# Patient Record
Sex: MALE | Race: WHITE | NOT HISPANIC OR LATINO | Employment: OTHER | URBAN - METROPOLITAN AREA
[De-identification: names, ages, dates, MRNs, and addresses within clinical notes are randomized per-mention and may not be internally consistent; named-entity substitution may affect disease eponyms.]

---

## 2021-04-15 DIAGNOSIS — Z23 ENCOUNTER FOR IMMUNIZATION: ICD-10-CM

## 2022-07-22 ENCOUNTER — APPOINTMENT (EMERGENCY)
Dept: RADIOLOGY | Facility: HOSPITAL | Age: 87
DRG: 312 | End: 2022-07-22
Payer: MEDICARE

## 2022-07-22 ENCOUNTER — HOSPITAL ENCOUNTER (INPATIENT)
Facility: HOSPITAL | Age: 87
LOS: 1 days | Discharge: NURSING, REHAB OR LONG-TERM CARE | DRG: 312 | End: 2022-07-25
Attending: EMERGENCY MEDICINE | Admitting: INTERNAL MEDICINE
Payer: MEDICARE

## 2022-07-22 ENCOUNTER — APPOINTMENT (EMERGENCY)
Dept: RADIOLOGY | Facility: HOSPITAL | Age: 87
DRG: 312 | End: 2022-07-22
Attending: EMERGENCY MEDICINE
Payer: MEDICARE

## 2022-07-22 DIAGNOSIS — R60.0 LEG EDEMA, RIGHT: ICD-10-CM

## 2022-07-22 DIAGNOSIS — R27.0 ATAXIA: Primary | ICD-10-CM

## 2022-07-22 DIAGNOSIS — G45.9 TIA (TRANSIENT ISCHEMIC ATTACK): ICD-10-CM

## 2022-07-22 LAB
ALBUMIN SERPL-MCNC: 3 G/DL (ref 3.4–5)
ALP SERPL-CCNC: 85 IU/L (ref 35–126)
ALT SERPL-CCNC: 15 IU/L (ref 16–63)
ANION GAP SERPL CALC-SCNC: 10 MEQ/L (ref 3–15)
AST SERPL-CCNC: 37 IU/L (ref 15–41)
BACTERIA URNS QL MICRO: NORMAL /HPF
BASOPHILS # BLD: 0.03 K/UL (ref 0.01–0.1)
BASOPHILS NFR BLD: 0.4 %
BILIRUB SERPL-MCNC: 1.7 MG/DL (ref 0.3–1.2)
BILIRUB UR QL STRIP.AUTO: NEGATIVE MG/DL
BUN SERPL-MCNC: 30 MG/DL (ref 8–20)
CALCIUM SERPL-MCNC: 8.5 MG/DL (ref 8.9–10.3)
CHLORIDE SERPL-SCNC: 104 MEQ/L (ref 98–109)
CLARITY UR REFRACT.AUTO: CLEAR
CO2 SERPL-SCNC: 20 MEQ/L (ref 22–32)
COLOR UR AUTO: YELLOW
CREAT SERPL-MCNC: 1.5 MG/DL (ref 0.8–1.3)
DIFFERENTIAL METHOD BLD: ABNORMAL
EOSINOPHIL # BLD: 0.07 K/UL (ref 0.04–0.54)
EOSINOPHIL NFR BLD: 0.9 %
ERYTHROCYTE [DISTWIDTH] IN BLOOD BY AUTOMATED COUNT: 14.7 % (ref 11.6–14.4)
GFR SERPL CREATININE-BSD FRML MDRD: 44 ML/MIN/1.73M*2
GLUCOSE SERPL-MCNC: 203 MG/DL (ref 70–99)
GLUCOSE UR STRIP.AUTO-MCNC: >=1000 MG/DL
HCT VFR BLDCO AUTO: 39.1 % (ref 40.1–51)
HGB BLD-MCNC: 13.5 G/DL (ref 13.7–17.5)
HGB UR QL STRIP.AUTO: NEGATIVE
HYALINE CASTS #/AREA URNS LPF: NORMAL /LPF
IMM GRANULOCYTES # BLD AUTO: 0.01 K/UL (ref 0–0.08)
IMM GRANULOCYTES NFR BLD AUTO: 0.1 %
KETONES UR STRIP.AUTO-MCNC: NEGATIVE MG/DL
LEUKOCYTE ESTERASE UR QL STRIP.AUTO: NEGATIVE
LYMPHOCYTES # BLD: 0.74 K/UL (ref 1.2–3.5)
LYMPHOCYTES NFR BLD: 9.7 %
MCH RBC QN AUTO: 35.5 PG (ref 28–33.2)
MCHC RBC AUTO-ENTMCNC: 34.5 G/DL (ref 32.2–36.5)
MCV RBC AUTO: 102.9 FL (ref 83–98)
MONOCYTES # BLD: 0.99 K/UL (ref 0.3–1)
MONOCYTES NFR BLD: 13 %
NEUTROPHILS # BLD: 5.78 K/UL (ref 1.7–7)
NEUTS SEG NFR BLD: 75.9 %
NITRITE UR QL STRIP.AUTO: NEGATIVE
NRBC BLD-RTO: 0 %
PDW BLD AUTO: 10 FL (ref 9.4–12.4)
PH UR STRIP.AUTO: 7.5 [PH]
PLATELET # BLD AUTO: 148 K/UL (ref 150–350)
POTASSIUM SERPL-SCNC: 4.1 MEQ/L (ref 3.6–5.1)
PROT SERPL-MCNC: 6.7 G/DL (ref 6–8.2)
PROT UR QL STRIP.AUTO: 1
RBC # BLD AUTO: 3.8 M/UL (ref 4.5–5.8)
RBC #/AREA URNS HPF: NORMAL /HPF
SARS-COV-2 RNA RESP QL NAA+PROBE: NEGATIVE
SODIUM SERPL-SCNC: 134 MEQ/L (ref 136–144)
SP GR UR REFRACT.AUTO: 1.02
SQUAMOUS URNS QL MICRO: NORMAL /HPF
UROBILINOGEN UR STRIP-ACNC: 8 EU/DL
WBC # BLD AUTO: 7.62 K/UL (ref 3.8–10.5)
WBC #/AREA URNS HPF: NORMAL /HPF

## 2022-07-22 PROCEDURE — 71045 X-RAY EXAM CHEST 1 VIEW: CPT

## 2022-07-22 PROCEDURE — 25800000 HC PHARMACY IV SOLUTIONS: Performed by: PHYSICIAN ASSISTANT

## 2022-07-22 PROCEDURE — U0002 COVID-19 LAB TEST NON-CDC: HCPCS

## 2022-07-22 PROCEDURE — 99285 EMERGENCY DEPT VISIT HI MDM: CPT | Mod: 25

## 2022-07-22 PROCEDURE — 93005 ELECTROCARDIOGRAM TRACING: CPT | Performed by: EMERGENCY MEDICINE

## 2022-07-22 PROCEDURE — 80053 COMPREHEN METABOLIC PANEL: CPT | Performed by: EMERGENCY MEDICINE

## 2022-07-22 PROCEDURE — 80053 COMPREHEN METABOLIC PANEL: CPT

## 2022-07-22 PROCEDURE — 85025 COMPLETE CBC W/AUTO DIFF WBC: CPT

## 2022-07-22 PROCEDURE — 81001 URINALYSIS AUTO W/SCOPE: CPT | Performed by: PHYSICIAN ASSISTANT

## 2022-07-22 PROCEDURE — 93005 ELECTROCARDIOGRAM TRACING: CPT

## 2022-07-22 PROCEDURE — 85025 COMPLETE CBC W/AUTO DIFF WBC: CPT | Performed by: EMERGENCY MEDICINE

## 2022-07-22 PROCEDURE — 96361 HYDRATE IV INFUSION ADD-ON: CPT | Performed by: HOSPITALIST

## 2022-07-22 PROCEDURE — 99220 PR INITIAL OBSERVATION CARE/DAY 70 MINUTES: CPT | Performed by: HOSPITALIST

## 2022-07-22 PROCEDURE — 70450 CT HEAD/BRAIN W/O DYE: CPT | Mod: MG

## 2022-07-22 PROCEDURE — 36415 COLL VENOUS BLD VENIPUNCTURE: CPT

## 2022-07-22 PROCEDURE — U0002 COVID-19 LAB TEST NON-CDC: HCPCS | Performed by: EMERGENCY MEDICINE

## 2022-07-22 PROCEDURE — 96360 HYDRATION IV INFUSION INIT: CPT

## 2022-07-22 PROCEDURE — 1123F ACP DISCUSS/DSCN MKR DOCD: CPT | Performed by: HOSPITALIST

## 2022-07-22 RX ORDER — HYDROCHLOROTHIAZIDE 12.5 MG/1
CAPSULE ORAL
COMMUNITY
End: 2022-07-22

## 2022-07-22 RX ORDER — LEVOTHYROXINE SODIUM 100 UG/1
100 TABLET ORAL
Status: ON HOLD | COMMUNITY
End: 2022-08-19 | Stop reason: SDUPTHER

## 2022-07-22 RX ORDER — SPIRONOLACTONE 25 MG/1
25 TABLET ORAL DAILY
COMMUNITY
Start: 2022-04-22 | End: 2022-08-23 | Stop reason: HOSPADM

## 2022-07-22 RX ORDER — UBIDECARENONE 100 MG
100 CAPSULE ORAL DAILY
COMMUNITY
End: 2022-08-23 | Stop reason: HOSPADM

## 2022-07-22 RX ORDER — TORSEMIDE 10 MG/1
10-20 TABLET ORAL DAILY
Status: ON HOLD | COMMUNITY
End: 2022-08-19 | Stop reason: SDUPTHER

## 2022-07-22 RX ORDER — SILDENAFIL CITRATE 20 MG/1
20 TABLET ORAL 3 TIMES DAILY
COMMUNITY
Start: 2022-04-22 | End: 2022-08-23 | Stop reason: HOSPADM

## 2022-07-22 RX ORDER — SIMVASTATIN 10 MG/1
10 TABLET, FILM COATED ORAL NIGHTLY
COMMUNITY
Start: 2022-03-25 | End: 2022-08-23 | Stop reason: HOSPADM

## 2022-07-22 RX ORDER — SACUBITRIL AND VALSARTAN 49; 51 MG/1; MG/1
1 TABLET, FILM COATED ORAL 2 TIMES DAILY
COMMUNITY
End: 2022-08-23 | Stop reason: HOSPADM

## 2022-07-22 RX ORDER — AMLODIPINE BESYLATE 2.5 MG/1
TABLET ORAL
COMMUNITY
End: 2022-07-22

## 2022-07-22 RX ORDER — CLOPIDOGREL BISULFATE 75 MG/1
75 TABLET ORAL DAILY
Status: ON HOLD | COMMUNITY
Start: 2022-02-15 | End: 2022-08-19 | Stop reason: SDUPTHER

## 2022-07-22 RX ORDER — METOPROLOL SUCCINATE 100 MG/1
100 TABLET, EXTENDED RELEASE ORAL DAILY
Status: ON HOLD | COMMUNITY
Start: 2022-04-22 | End: 2022-08-19 | Stop reason: SDUPTHER

## 2022-07-22 RX ORDER — OMEPRAZOLE 20 MG/1
CAPSULE, DELAYED RELEASE ORAL
COMMUNITY
End: 2022-07-22

## 2022-07-22 RX ADMIN — SODIUM CHLORIDE 500 ML: 9 INJECTION, SOLUTION INTRAVENOUS at 22:23

## 2022-07-22 ASSESSMENT — ENCOUNTER SYMPTOMS
ACTIVITY CHANGE: 0
WEAKNESS: 1
HEADACHES: 0
WHEEZING: 0
ABDOMINAL PAIN: 0
HEMATURIA: 0
FACIAL SWELLING: 0
SPEECH DIFFICULTY: 0
NECK PAIN: 0
AGITATION: 0
SHORTNESS OF BREATH: 0
BACK PAIN: 0
SEIZURES: 0
VOMITING: 0
DIAPHORESIS: 0
COLOR CHANGE: 0
SORE THROAT: 0
FEVER: 0
DIARRHEA: 0
COUGH: 0
NAUSEA: 0
DIFFICULTY URINATING: 0

## 2022-07-23 ENCOUNTER — APPOINTMENT (OUTPATIENT)
Dept: RADIOLOGY | Facility: HOSPITAL | Age: 87
Setting detail: OBSERVATION
DRG: 312 | End: 2022-07-23
Attending: HOSPITALIST
Payer: MEDICARE

## 2022-07-23 PROBLEM — R26.9 GAIT DISTURBANCE: Status: ACTIVE | Noted: 2022-07-23

## 2022-07-23 LAB
ANION GAP SERPL CALC-SCNC: 10 MEQ/L (ref 3–15)
ATRIAL RATE: 85
BUN SERPL-MCNC: 27 MG/DL (ref 8–20)
CALCIUM SERPL-MCNC: 8.6 MG/DL (ref 8.9–10.3)
CHLORIDE SERPL-SCNC: 105 MEQ/L (ref 98–109)
CHOLEST SERPL-MCNC: 94 MG/DL
CO2 SERPL-SCNC: 21 MEQ/L (ref 22–32)
CREAT SERPL-MCNC: 1.3 MG/DL (ref 0.8–1.3)
ERYTHROCYTE [DISTWIDTH] IN BLOOD BY AUTOMATED COUNT: 14.7 % (ref 11.6–14.4)
EST. AVERAGE GLUCOSE BLD GHB EST-MCNC: 134 MG/DL
GFR SERPL CREATININE-BSD FRML MDRD: 51.9 ML/MIN/1.73M*2
GLUCOSE BLD-MCNC: 107 MG/DL (ref 70–99)
GLUCOSE BLD-MCNC: 132 MG/DL (ref 70–99)
GLUCOSE BLD-MCNC: 163 MG/DL (ref 70–99)
GLUCOSE SERPL-MCNC: 112 MG/DL (ref 70–99)
HBA1C MFR BLD HPLC: 6.3 %
HCT VFR BLDCO AUTO: 37.4 % (ref 40.1–51)
HDLC SERPL-MCNC: 23 MG/DL
HDLC SERPL: 4.1 {RATIO}
HGB BLD-MCNC: 12.6 G/DL (ref 13.7–17.5)
LDLC SERPL CALC-MCNC: 63 MG/DL
MCH RBC QN AUTO: 35.2 PG (ref 28–33.2)
MCHC RBC AUTO-ENTMCNC: 33.7 G/DL (ref 32.2–36.5)
MCV RBC AUTO: 104.5 FL (ref 83–98)
NONHDLC SERPL-MCNC: 71 MG/DL
PDW BLD AUTO: 10.5 FL (ref 9.4–12.4)
PLATELET # BLD AUTO: 148 K/UL (ref 150–350)
POCT TEST: ABNORMAL
POTASSIUM SERPL-SCNC: 3.8 MEQ/L (ref 3.6–5.1)
QRS DURATION: 168
QT INTERVAL: 452
QTC CALCULATION(BAZETT): 515
R AXIS: 62
RBC # BLD AUTO: 3.58 M/UL (ref 4.5–5.8)
SODIUM SERPL-SCNC: 136 MEQ/L (ref 136–144)
T WAVE AXIS: 39
TRIGL SERPL-MCNC: 41 MG/DL (ref 30–149)
VENTRICULAR RATE: 78
WBC # BLD AUTO: 8.14 K/UL (ref 3.8–10.5)

## 2022-07-23 PROCEDURE — 63700000 HC SELF-ADMINISTRABLE DRUG: Performed by: HOSPITALIST

## 2022-07-23 PROCEDURE — G0378 HOSPITAL OBSERVATION PER HR: HCPCS

## 2022-07-23 PROCEDURE — 70544 MR ANGIOGRAPHY HEAD W/O DYE: CPT | Mod: ME

## 2022-07-23 PROCEDURE — 85027 COMPLETE CBC AUTOMATED: CPT | Performed by: HOSPITALIST

## 2022-07-23 PROCEDURE — 99226 PR SBSQ OBSERVATION CARE/DAY 35 MINUTES: CPT | Performed by: INTERNAL MEDICINE

## 2022-07-23 PROCEDURE — 99223 1ST HOSP IP/OBS HIGH 75: CPT | Performed by: PSYCHIATRY & NEUROLOGY

## 2022-07-23 PROCEDURE — 80048 BASIC METABOLIC PNL TOTAL CA: CPT | Performed by: HOSPITALIST

## 2022-07-23 PROCEDURE — G1004 CDSM NDSC: HCPCS

## 2022-07-23 PROCEDURE — 80061 LIPID PANEL: CPT | Performed by: HOSPITALIST

## 2022-07-23 PROCEDURE — 83036 HEMOGLOBIN GLYCOSYLATED A1C: CPT | Performed by: INTERNAL MEDICINE

## 2022-07-23 PROCEDURE — 93010 ELECTROCARDIOGRAM REPORT: CPT | Performed by: INTERNAL MEDICINE

## 2022-07-23 PROCEDURE — 93971 EXTREMITY STUDY: CPT | Mod: RT

## 2022-07-23 PROCEDURE — 36415 COLL VENOUS BLD VENIPUNCTURE: CPT | Performed by: HOSPITALIST

## 2022-07-23 RX ORDER — IBUPROFEN 200 MG
16-32 TABLET ORAL AS NEEDED
Status: DISCONTINUED | OUTPATIENT
Start: 2022-07-23 | End: 2022-07-25 | Stop reason: HOSPADM

## 2022-07-23 RX ORDER — DEXTROSE 40 %
15-30 GEL (GRAM) ORAL AS NEEDED
Status: DISCONTINUED | OUTPATIENT
Start: 2022-07-23 | End: 2022-07-25 | Stop reason: HOSPADM

## 2022-07-23 RX ORDER — CLOPIDOGREL BISULFATE 75 MG/1
75 TABLET ORAL DAILY
Status: DISCONTINUED | OUTPATIENT
Start: 2022-07-23 | End: 2022-07-25 | Stop reason: HOSPADM

## 2022-07-23 RX ORDER — SPIRONOLACTONE 25 MG/1
25 TABLET ORAL DAILY
Status: DISCONTINUED | OUTPATIENT
Start: 2022-07-23 | End: 2022-07-25 | Stop reason: HOSPADM

## 2022-07-23 RX ORDER — HEPARIN SODIUM 5000 [USP'U]/ML
5000 INJECTION, SOLUTION INTRAVENOUS; SUBCUTANEOUS EVERY 8 HOURS
Status: DISCONTINUED | OUTPATIENT
Start: 2022-07-23 | End: 2022-07-25 | Stop reason: HOSPADM

## 2022-07-23 RX ORDER — LEVOTHYROXINE SODIUM 100 UG/1
100 TABLET ORAL
Status: DISCONTINUED | OUTPATIENT
Start: 2022-07-23 | End: 2022-07-25 | Stop reason: HOSPADM

## 2022-07-23 RX ORDER — PRAVASTATIN SODIUM 20 MG/1
20 TABLET ORAL DAILY
Status: DISCONTINUED | OUTPATIENT
Start: 2022-07-23 | End: 2022-07-25 | Stop reason: HOSPADM

## 2022-07-23 RX ORDER — METOPROLOL SUCCINATE 100 MG/1
100 TABLET, EXTENDED RELEASE ORAL DAILY
Status: DISCONTINUED | OUTPATIENT
Start: 2022-07-23 | End: 2022-07-25 | Stop reason: HOSPADM

## 2022-07-23 RX ORDER — SILDENAFIL CITRATE 20 MG/1
20 TABLET ORAL 3 TIMES DAILY
Status: DISCONTINUED | OUTPATIENT
Start: 2022-07-23 | End: 2022-07-25 | Stop reason: HOSPADM

## 2022-07-23 RX ORDER — INSULIN ASPART 100 [IU]/ML
3-5 INJECTION, SOLUTION INTRAVENOUS; SUBCUTANEOUS
Status: DISCONTINUED | OUTPATIENT
Start: 2022-07-23 | End: 2022-07-24

## 2022-07-23 RX ORDER — DEXTROSE 50 % IN WATER (D50W) INTRAVENOUS SYRINGE
25 AS NEEDED
Status: DISCONTINUED | OUTPATIENT
Start: 2022-07-23 | End: 2022-07-25 | Stop reason: HOSPADM

## 2022-07-23 RX ORDER — SACUBITRIL AND VALSARTAN 49; 51 MG/1; MG/1
1 TABLET, FILM COATED ORAL 2 TIMES DAILY
Status: DISCONTINUED | OUTPATIENT
Start: 2022-07-23 | End: 2022-07-25 | Stop reason: HOSPADM

## 2022-07-23 RX ORDER — TORSEMIDE 10 MG/1
10 TABLET ORAL DAILY
Status: DISCONTINUED | OUTPATIENT
Start: 2022-07-23 | End: 2022-07-25 | Stop reason: HOSPADM

## 2022-07-23 RX ADMIN — SILDENAFIL CITRATE 20 MG: 20 TABLET ORAL at 10:52

## 2022-07-23 RX ADMIN — SACUBITRIL AND VALSARTAN 1 TABLET: 49; 51 TABLET, FILM COATED ORAL at 20:19

## 2022-07-23 RX ADMIN — PRAVASTATIN SODIUM 20 MG: 20 TABLET ORAL at 17:40

## 2022-07-23 RX ADMIN — EMPAGLIFLOZIN 10 MG: 10 TABLET, FILM COATED ORAL at 10:52

## 2022-07-23 RX ADMIN — METOPROLOL SUCCINATE 100 MG: 100 TABLET, EXTENDED RELEASE ORAL at 10:52

## 2022-07-23 RX ADMIN — LEVOTHYROXINE SODIUM 100 MCG: 0.1 TABLET ORAL at 05:48

## 2022-07-23 RX ADMIN — SILDENAFIL CITRATE 20 MG: 20 TABLET ORAL at 20:19

## 2022-07-23 RX ADMIN — PRAVASTATIN SODIUM 20 MG: 20 TABLET ORAL at 02:24

## 2022-07-23 ASSESSMENT — PATIENT HEALTH QUESTIONNAIRE - PHQ9: SUM OF ALL RESPONSES TO PHQ9 QUESTIONS 1 & 2: 0

## 2022-07-23 NOTE — CONSULTS
"  Neurology Consult Note    Reason for Consultation: Episodes of gait disturbance  Chief Complaint: \"I had three episodes of heart symptoms\"    History of Presenting Illness:  The patient is a 89 year old Right handed man with reported medical history most notable for CAD, A Fib, advanced CHF, advanced Tricuspid regurgitation and Pulmonary Hypertension (on Sildenafil), with additional history most notable for severe GI bleeding in the past resulting in anticoagulation cessation since. The patient is currently admitted to the hospital for evaluation of recurrent episodes of generalized weakness associated with postural changes and/or exertion.    Neurology is consulted to evaluate the patient for possible stroke/cerebrovascular disease as being the etiology for his presenting symptoms.    History is obtained from the patient. Collateral information is obtained from the patient's medical chart, and reviewed as documented in the ER and Admission H&P notes.    The patient reports having three episodes of similar generalized weakness, with the most severe occurring yesterday, resulting in his acute hospitalization.   - He reports his initial mild symptomatic event to have occurred about 2 weeks ago, which is he describes as a sensation of generalized weakness associated with sudden orthostatic postural change. He reports that he stood up from a prolonged seated position and took a few steps, and then felt very weak and unable to take any more steps. He felt frozen in place and would have collapsed if he was not held up by his family members. He was helped down into a chair, and felt better immediately after sitting down, and felt back to his baseline within 10 to 15 minutes of resting. This is described at the mildest of his symptoms.  - He reports his second symptomatic event to have occurred 2 days ago, on 7/21/2022. He describes this as a sensation of moderate to severe generalized weakness, with inability to stand up " from a seated position by himself. He attempted to pull himself up using a Walker but was unable to do so. He was helped up by a family member but felt frozen in place again, and unable to take a step due to feeling weakness.   - He reports having his most recent symptomatic episode yesterday, which was also the most severe. He reports that he had been upright, walking around in his residence, packing for a trip. He had gradual onset of the generalized weakness over several minutes, resulting in the same sensation of being unable to walk anymore/take anymore steps, and had to be helped back into a chair by his family. He felt better after sitting down, and back to his usual baseline about half hour later.  - He denies any headache, changes in his level of consciousness, dizziness, lightheadedness, vertigo, changes in his vision, changes in his speech, any confusion, changes in his peripheral sensation or changes in his peripheral strength with any of these events. He reports that he continues to be able to move his legs once he is in a seated position. He also reports sitting and resting to consistently result in immediate improvement in his symptoms.  - Of note, the patient was started on Verquvo by one of his outpatient providers about 4 days ago, despite the patient being on treatment with Revatio for his pulmonary hypertension. The patient's more severe daily events occurred immediately after adding Verquvo to his medication regimen. On top of that, the patient is very clear about having problems with low BP in the past, even prior to starting this new medication.    Review of Systems  All other systems reviewed and negative except as noted in the HPI.    Allergies: Patient has no known allergies.    Current Inpatient Medications   Medication Dose Route Frequency Provider Last Rate Last Admin   • clopidogreL (PLAVIX) tablet 75 mg  75 mg oral Daily Natalie Ambrosio O, DO       • glucose chewable tablet 16-32 g of  dextrose  16-32 g of dextrose oral PRN Natalie Ambrosio DO        Or   • dextrose 40 % oral gel 15-30 g of dextrose  15-30 g of dextrose oral PRN Natalie Ambrosio DO        Or   • glucagon (GLUCAGEN) injection 1 mg  1 mg intramuscular PRN Natalie Ambrosio DO        Or   • dextrose 50 % in water (D50) injection 12.5 g  25 mL intravenous PRN Natalie Ambrosio DO       • empagliflozin (JARDIANCE) tablet 10 mg  10 mg oral Daily Natalie Ambrosio DO   10 mg at 07/23/22 1052   • heparin (porcine) 5,000 unit/mL injection 5,000 Units  5,000 Units subcutaneous q8h IVÁN Natalie Ambrosio DO       • insulin aspart U-100 (NovoLOG) pen 3-5 Units  3-5 Units subcutaneous TID with meals Natalie Ambrosio DO       • levothyroxine (SYNTHROID) tablet 100 mcg  100 mcg oral Daily (6:30a) Natalie Ambrosio DO   100 mcg at 07/23/22 0548   • metoprolol succinate XL (TOPROL-XL) 24 hr ER tablet 100 mg  100 mg oral Daily Natalie Ambrosio DO   100 mg at 07/23/22 1052   • pravastatin (PRAVACHOL) tablet 20 mg  20 mg oral Daily Natalie Ambrosio DO   20 mg at 07/23/22 0224   • [Provider Managed Hold] sacubitriL-valsartan (ENTRESTO) 49-51 mg per tablet 1 tablet  1 tablet oral BID Natalie Ambrosio DO       • sildenafiL (pulm.hypertension) (REVATIO) tablet 20 mg  20 mg oral TID Natalie Ambrosio DO   20 mg at 07/23/22 1052   • [Provider Managed Hold] spironolactone (ALDACTONE) tablet 25 mg  25 mg oral Daily Natalie Ambrosio DO       • [Provider Managed Hold] torsemide (DEMADEX) tablet 10 mg  10 mg oral Daily Natalie Ambrosio DO       • vericiguat (VERQUVO) tablet 2.5 mg  2.5 mg oral Daily with breakfast Natalie Ambrosio DO           Medical History:   Past Medical History:   Diagnosis Date   • Atrial fibrillation (CMS/HCC)    • Disease of thyroid gland    • Hypertension    • Myocardial infarction (CMS/HCC)        Surgical History:   Past Surgical History:   Procedure Laterality Date   • BYPASS GRAFT          Social History:   Social History     Socioeconomic History   • Marital status:      Spouse name: None   • Number of children: None   • Years of education: None   • Highest education level: None   Tobacco Use   • Smoking status: Never Smoker   • Smokeless tobacco: Never Used     Social Determinants of Health     Food Insecurity: No Food Insecurity   • Worried About Running Out of Food in the Last Year: Never true   • Ran Out of Food in the Last Year: Never true       Family History: History reviewed. No pertinent family history.    Objective   Temp:  [36.7 °C (98.1 °F)] 36.7 °C (98.1 °F)  Heart Rate:  [65-74] 65  Resp:  [16-23] 20  BP: (112-135)/(59-92) 128/71  SpO2:  [95 %-98 %] 98 %  Oxygen Therapy: None (Room air)    NIHSS Score: 0    Physical Exam  General Appearance: Elderly man with age appropriate appearance, resting in his hospital bed, without any acute clinical distress  Cardiac: RRR, asymmetric peripheral edema worse in his Right leg compared to his Left  Respiratory: Mild tachypnea at rest    Neurologic Exam:  Head/Neck: No scalp tenderness on palpation. No nuchal rigidity  Mental Status:  - Level of Consciousness: Well Awake  - Alertness: Alert in response to voice with appropriately timed responses  - Attention: Intact/Normal  - Orientation: Intact to person/self; Intact to time (date/month/year); Intact to location; Intact to situation appropriately  - Neglect - No visual or spatial neglect noted on exam  - Mood/Affect - Normal mood with euthymic appearing affect  - Behavior/Cooperation - Pleasant and cooperative with exam    - Language: Speech is fluent with normal prosody  1. Naming - Names all simple objects to confrontation  2. Comprehension - Able to follow simple commands well; Able to follow complex 3 step commands crossing Right-Left without any difficulties  3. Repetition - Able to repeat full sentences without any errors  - Calculations: Able to perform simple calculations  - No  Ideomotor apraxia    Cranial Nerves:  CN II: Visual Fields: Intact to confrontation in all quadrants bilaterally without any neglect on double simultaneous stimulation; Pupils are equal, round and reactive to light bilaterally; Fundus exam: Optic discs cannot be visualized  CN III/IV/VI: Extraocular movements are intact bilaterally in all directions without any nystagmus  CN V: Normal facial sensation bilaterally  CN VII: Normal symmetric facial movement at rest, with smiling and with speech in both upper and lower facial distributions  CN VIII: Hearing moderate to severely reduced bilaterally  CN IX/X: Symmetric palate elevation, No uvular deviation, No dysarthria noted  CN XI: Normal 5/5 shoulder shrug bilaterally  CN XII: Normal/midline tongue protrusion    Motor:  Bulk: No focal atrophy noted, but Right leg is larger than the Left due to asymmetric edema  Tone: Normal tone without any rigidity, spasticity or hypotonia noted on exam. No wrists rigidity despite contralateral activation  Bradykinesia: No decrementing bradykinesia noted on exam  Abnormal movements: No resting, action or intention tremors noted    Strength: Based on expected strength for age, clinical condition and muscle mass.  Shoulder abduction/Deltoids - 5/5 bilaterally  Elbow Flexion/Biceps - 5/5 bilaterally  Elbow Extension/Triceps - 5/5 bilaterally  Wrist Flexion - 5/5 bilaterally  Wrist Extension - 5/5 bilaterally   strength/Interosseous - 5/5 bilaterally    Hip Flexion/Iliopsoas - 5/5 bilaterally  Hip Abduction - 5/5 bilaterally  Hip Adduction - 5/5 bilaterally  Knee Flexion/Hamstrings - 5/5 bilaterally  Knee Extension/Quadriceps - 5/5 bilaterally  Ankle Dorsiflexion/Tibialis Ant - 5/5 bilaterally  Ankle Planter flexion/Gastroc - 5/5 bilaterally    Sensory:  Sensation intact to light touch in both upper and lower extremities bilaterally  No sensory neglect    Reflexes:  Reflex Name: Right Left  Biceps                1+    1+  Brachioradialis   1+   1+  Knee Jerk          1+   1+  Ankle Jerk         1+   1+  Babinski     Absent   Absent  Clonus          None   None    Coordination:  No truncal ataxia  No dysmetria on Finger to nose or Heel to shin testing bilaterally    Gait: Cannot be tested at this time            Labs - I have reviewed the following lab results:  Admission on 07/22/2022   Component Date Value   • Sodium 07/22/2022 134 (A)   • Potassium 07/22/2022 4.1    • Chloride 07/22/2022 104    • CO2 07/22/2022 20 (A)   • BUN 07/22/2022 30 (A)   • Creatinine 07/22/2022 1.5 (A)   • Glucose 07/22/2022 203 (A)   • Calcium 07/22/2022 8.5 (A)   • AST (SGOT) 07/22/2022 37    • ALT (SGPT) 07/22/2022 15 (A)   • Alkaline Phosphatase 07/22/2022 85    • Total Protein 07/22/2022 6.7    • Albumin 07/22/2022 3.0 (A)   • Bilirubin, Total 07/22/2022 1.7 (A)   • eGFR 07/22/2022 44.0 (A)   • Anion Gap 07/22/2022 10    • WBC 07/22/2022 7.62    • RBC 07/22/2022 3.80 (A)   • Hemoglobin 07/22/2022 13.5 (A)   • Hematocrit 07/22/2022 39.1 (A)   • MCV 07/22/2022 102.9 (A)   • MCH 07/22/2022 35.5 (A)   • MCHC 07/22/2022 34.5    • RDW 07/22/2022 14.7 (A)   • Platelets 07/22/2022 148 (A)   • MPV 07/22/2022 10.0    • Differential Type 07/22/2022 Auto    • nRBC 07/22/2022 0.0    • Immature Granulocytes 07/22/2022 0.1    • Neutrophils 07/22/2022 75.9    • Lymphocytes 07/22/2022 9.7    • Monocytes 07/22/2022 13.0    • Eosinophils 07/22/2022 0.9    • Basophils 07/22/2022 0.4    • Immature Granulocytes, A* 07/22/2022 0.01    • Neutrophils, Absolute 07/22/2022 5.78    • Lymphocytes, Absolute 07/22/2022 0.74 (A)   • Monocytes, Absolute 07/22/2022 0.99    • Eosinophils, Absolute 07/22/2022 0.07    • Basophils, Absolute 07/22/2022 0.03    • SARS-CoV-2 (COVID-19) 07/22/2022 Negative    • Ventricular rate 07/22/2022 78    • Atrial rate 07/22/2022 85    • QRS duration 07/22/2022 168    • QT Interval 07/22/2022 452    • QTC Calculation(Bazett) 07/22/2022 515    • R  Axis 07/22/2022 62    • T Wave Axis 07/22/2022 39    • Color, Urine 07/22/2022 Yellow    • Clarity, Urine 07/22/2022 Clear    • Specific Gravity, Urine 07/22/2022 1.021    • pH, Urine 07/22/2022 7.5    • Leukocyte Esterase 07/22/2022 Negative    • Nitrite, Urine 07/22/2022 Negative    • Protein, Urine 07/22/2022 +1 (A)   • Glucose, Urine 07/22/2022 >=1000 (A)   • Ketones, Urine 07/22/2022 Negative    • Urobilinogen, Urine 07/22/2022 8.0 (A)   • Bilirubin, Urine 07/22/2022 Negative    • Blood, Urine 07/22/2022 Negative    • RBC, Urine 07/22/2022 0 TO 4    • WBC, Urine 07/22/2022 0 TO 3    • Squamous Epithelial 07/22/2022 None Seen    • Hyaline Cast 07/22/2022 None Seen    • Bacteria, Urine 07/22/2022 None Seen    • Sodium 07/23/2022 136    • Potassium 07/23/2022 3.8    • Chloride 07/23/2022 105    • CO2 07/23/2022 21 (A)   • BUN 07/23/2022 27 (A)   • Creatinine 07/23/2022 1.3    • Glucose 07/23/2022 112 (A)   • Calcium 07/23/2022 8.6 (A)   • eGFR 07/23/2022 51.9 (A)   • Anion Gap 07/23/2022 10    • Triglycerides 07/23/2022 41    • Cholesterol 07/23/2022 94    • HDL 07/23/2022 23 (A)   • LDL Calculated 07/23/2022 63    • Non-HDL, Calculated 07/23/2022 71    • RISK 07/23/2022 4.1    • WBC 07/23/2022 8.14    • RBC 07/23/2022 3.58 (A)   • Hemoglobin 07/23/2022 12.6 (A)   • Hematocrit 07/23/2022 37.4 (A)   • MCV 07/23/2022 104.5 (A)   • MCH 07/23/2022 35.2 (A)   • MCHC 07/23/2022 33.7    • RDW 07/23/2022 14.7 (A)   • Platelets 07/23/2022 148 (A)   • MPV 07/23/2022 10.5          Imaging:  I have personally reviewed the images for the NeuroImaging studies listed below. My interpretation is noted as following:     MRI  Brain without contrast: 7/23/2022   - No acute abnormalities, including no acute stroke or hemorrhage noted.  - Mild chronic white matter changes.  - Moderately advanced diffuse cerebral and cerebellar atrophy noted.    MRA Head/Neck without contrast: 7/23/2022   - Accuracy/Reliability limited due to  inherently lower resolution for MRA, presence of motion and other artifacts, and lack of IV contrast use. Within these limitations, the following potential findings are noted:  1. Intracranial atherosclerosis. 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  2. No clear stenosis in the visualized portion of extracranial carotid or vertebral arteries.          Impression/Assessment:  1. The patient's current clinical presentation suggests generalized weakness related to postural/orthostatic changes and/or exertional activity, likely related to symptomatic Hypotension. The two symptomatic events occurring on consecutive days resulting in this hospitalization appear to be directly related to the addition of Verquvo to the patient's medication regimen, which already included Revatio/Sildenafil. This medication combination is known to produce severe hypotension and is hence contraindicated. The patient's additional treatment with Entresto, Torsemide and Metoprolol may have further contributed to his symptomatic hypotension.  2. The patient's current clinical presentation reveals NO primary neurologic symptoms AT ALL in order to raise suspicion for Acute Stroke or TIA.   3. Theoretically, the patient is at risk of Vertebrobasilar insufficiency resulting in Syncope and Stroke, especially in case of persistent or recurrent systemic hypotension, due to the presence of proximal Basilar stenosis. However, the patient's current symptomatic events are not consistent with Vertebrobasilar insufficiency either, regardless of the intracranial vascular stenosis.    Recommendations:  1. The patient's Verquvo therapy should be discontinued immediately, and caution should be used to avoid similar drug-drug interactions in the future.   2. The patient's BP should be monitored in order to avoid any recurrent hypotensive events.   3. The patient requires  appropriate long term cardiac and pulmonary treatment in order to avoid any significant cerebral ischemia or hypoxia. Otherwise, the patient requires no further inpatient or outpatient neurologic work up/management for these current presenting symptoms.   4. The patient may continue his prior to admission Antiplatelet therapy with Plavix 75 mg daily, as long as there are no medical contraindications regarding this therapy. Similarly, the patient may continue his prior to admission Statin therapy from a neurologic stand point, and continue to follow up with his PCP for long term vascular risk factor management.  5. Supportive Physical therapy and ambulatory assistive device use is recommended to prevent falls. Otherwise, the patient's ambulatory function is limited due to his baseline cardiac and pulmonary disease, and not due to a primary neurologic problem. The patient is at his neurologic baseline on the current assessment. He may be discharged home from a neurologic stand point, if deemed stable from an overall medical/cardiopulmonary stand point.     The patient has been counseled extensively on the current neurologic findings, Impression and Recommendations noted above. Specifically, the patient has been counseled extensively to monitor his BP, volume status and cardiopulmonary symptoms closely. He is educated on signs/symptoms of acute stroke, and his risk for Syncope/Stroke due to systemic hypotension from his cardiopulmonary disease. All questions are answered in detail.    The patient's case/recommendations have been discussed with Dr. Blake/Primary Medicine team in detail today.

## 2022-07-23 NOTE — ASSESSMENT & PLAN NOTE
-- Chronic systolic heart failure, without exacerbation  - He denies any new or worsening symptoms and is at baseline, stable R>L BLE edema  - Restarted torsemide/ spironolactone/ ARNI  - Has severe pulmonary hypertension for which we will continue his home sildenafil and hold his vericiguat as above   -- US negative for RLE DVT

## 2022-07-23 NOTE — ED ATTESTATION NOTE
Procedures  Physical Exam  Review of Systems    7/23/20223:26 PM  I have personally seen and examined the patient.  I reviewed and agree with the PA/NP/Resident's assessment and plan of care.    My examination, assessment, and plan of care of Samy Elena is as follows:    The patient presents with transient ataxia a few times over the last few weeks.  He was in his doctor's office 3 weeks ago and could not get up off the chair.  Seems to have a show slow shuffling gait most the time and sometimes he feels extremely off balance.  These episodes usually last about 20 minutes at a time.    Exam: Awake alert and oriented at this point.  Neuro exam is nonfocal.  There is no dysmetria.  Lungs are clear.  Heart is sinus without any murmur.  Abdomen soft and nontender.    Impression/Plan: Possible TIAs with these episodes of ataxia.  Agree with PA management and hospitalization.  Labs and CT head are currently unremarked.    Vital Signs Review: Vital signs have been reviewed. The oxygen saturation is  SpO2: 95 % which is normal.     This document was created using dragon dictation software.  There might be some typographical errors due to this technology.    We are in a pandemic with increased volumes and decreased capacity.      Kristopher Acosta MD  07/23/22 3635

## 2022-07-23 NOTE — PLAN OF CARE
Plan of Care Review  Plan of Care Reviewed With: patient, son  Progress: improving  Outcome Summary: Pt arrived to 161 from ED. Admitted with gait disturbance. Pt AAOx4, vital signs stable, denied dizziness, lightheadedness, denied pain. Gait unsteady, 2 person assist out of bed. Pt, son oriented to room, call bell, call bell in reach. Reviewed plan of care. Pt, family verbalized understanding.

## 2022-07-23 NOTE — ASSESSMENT & PLAN NOTE
- MRI negative for acute infarct. Discussed with Dr. Chapman of neurology- this is likely hypotension in the setting of vericiguat and sildenafil. Will stop vericiguat and monitor BPs. Will need outpatient cardiology f/u.  - Monitor on telemetry  -- PT/ OT recommending TCC (or [facility]). Awaiting bed.

## 2022-07-23 NOTE — PROGRESS NOTES
Hospital Medicine Service -  Daily Progress Note       SUBJECTIVE   Interval History: He is feeling better, but gait remains unsteady.     OBJECTIVE      Vital signs in last 24 hours:  Temp:  [36.5 °C (97.7 °F)-36.7 °C (98.1 °F)] 36.5 °C (97.7 °F)  Heart Rate:  [65-77] 77  Resp:  [16-23] 18  BP: (112-135)/(59-92) 132/65  No intake or output data in the 24 hours ending 07/23/22 1604    PHYSICAL EXAMINATION      Physical Exam    General: NAD, comfortable  HEENT: + JVD, EOMI, MMM  Lungs: CTA b/l  Cards: irreg, 3/6 SM murmurs  Abd: Soft, NT, ND, + BS  Ext: 2+ edema, R>L   Neuro: AAOx3, non focal, moving all 4 extremities, ms and sensation intact    LINES, CATHETERS, DRAINS, AIRWAYS, AND WOUNDS   Lines, Drains, and Airways:  Wounds (agree with documentation and present on admission):         Comments:      LABS / IMAGING / TELE      Labs  Lab Results   Component Value Date    WBC 8.14 07/23/2022    HGB 12.6 (L) 07/23/2022    HCT 37.4 (L) 07/23/2022    .5 (H) 07/23/2022     (L) 07/23/2022     Lab Results   Component Value Date    GLUCOSE 112 (H) 07/23/2022    CALCIUM 8.6 (L) 07/23/2022     07/23/2022    K 3.8 07/23/2022    CO2 21 (L) 07/23/2022     07/23/2022    BUN 27 (H) 07/23/2022    CREATININE 1.3 07/23/2022         SARS-CoV-2 (COVID-19) (no units)   Date/Time Value   07/22/2022 2001 Negative       Imaging  MRI/ MRA:    1. No acute intracranial abnormality, no acute infarct.   2. Mild microangiopathic changes.   3. MRA of the head is remarkable for marked irregularity of the basilar artery   with a moderate to severe stenosis of the proximal basilar artery.   4. Unremarkable MRA of the neck.     ECG/Telemetry  I have independently reviewed the telemetry. No events for the last 24 hours.    ASSESSMENT AND PLAN      Hyperglycemia  Assessment & Plan  -A1c 6.3, outpatient f/u    Ischemic cardiomyopathy  Assessment & Plan  - He denies any new or worsening symptoms, weights have been stable but  does appear to have evidence of edema and distended neck veins on exam- but appears to have had a similar exam when seen by his cardiologist - and states stable for him R>L.   - Check accurate weight now, check orthostatics, diuresis pending result.  - Has severe pulmonary hypertension for which we will continue his home sildenafil and hold his vericiguat as above   -- US negative for RLE DVT    Hypertension  Assessment & Plan  Blood pressure appears to be normal, holding spironolactone and Entresto in the setting of CALI    Atrial fibrillation (CMS/HCC)  Assessment & Plan  He is not on AC due to prior severe GI bleed on AC.  This does put him at risk for CVA  as well  - Rate controlled currently, appears to be A. fib  - Continue metoprolol 100 mg daily    * Gait disturbance  Assessment & Plan  - MRI negative for acute infarct. Discussed with Dr. Chapman of neurology- this is likely hypotension in the setting of vericiguat and sildenafil. Will stop vericiguat and monitor BPs. Will need outpatient cardiology f/u.  - Check orthostatics as well  - Monitor on telemetry  -- PT/ OT for disposition         VTE Assessment: [place]    VTE Prophylaxis:  Current anticoagulants:  heparin (porcine) 5,000 unit/mL injection 5,000 Units, subcutaneous, q8h IVÁN      Code Status: Full Code  Palliative Care Screening Score: 2   Estimated Discharge Date: 7/24/2022     Disposition Planning: Pending PT evaluation     Cindy Blake MD  7/23/2022

## 2022-07-23 NOTE — H&P
Hospital Medicine Service -  History & Physical        CHIEF COMPLAINT   Weakness, gait instability      HISTORY OF PRESENT ILLNESS      Samy Elena is a 89 y.o. male with a past medical history of CAD, ischemic cardiomyopathy and biventricular dysfunction with moderate to severe tricuspid insufficiency follows at [facility]?, afib not on ac due to severe GIB, on plavix currently  Pulmonary htn on sildenafil presenting with    Several episodes of ib/l leg weakness that was so profound he was unable to walk normally, felt very unstable on his feet and couldn't walk.   Garbled speech - today, no other neurologic deficits. He reports he had 3 episodes which started 3 weeks ago.   He does report some lightheadedness with the episodes, no syncope.   He was started on sildenafil  Several months ago and vericiguat 3 days ago otherwise no new meds, has been taking his torsemide dose based on weight as ordered per his cardio.    He does report some diarrhea over last few weeks, also some frequency when takes his diuretic which he has been taking daily, weight is stable     He denies any orthopnea, pnd, does reprt stable le edema and weight 178 ths am which is at baseline. He did miss his 10mg of torsemide today because was going to beach and didn't want to have to stop.     CODE status discussed, FULL    In the ED, VSS, bp wnl, labs with hyprglycemia and glucose>1000,  Cr- 1.5, cth neg for acute. Given 500cc ns     PAST MEDICAL AND SURGICAL HISTORY      Past Medical History:   Diagnosis Date   • Atrial fibrillation (CMS/HCC)    • Disease of thyroid gland    • Hypertension    • Myocardial infarction (CMS/HCC)        Past Surgical History:   Procedure Laterality Date   • BYPASS GRAFT         PCP: Zachery Hernandez MD    MEDICATIONS      Prior to Admission medications    Medication Sig Start Date End Date Taking? Authorizing Provider   clopidogreL (PLAVIX) 75 mg tablet clopidogrel 75 mg tablet   TAKE 1 TABLET BY MOUTH EVERY DAY  2/15/22  Yes John Chambers MD   empagliflozin (JARDIANCE) 10 mg tablet Jardiance 10 mg tablet   1 TABLET DAILY 5/9/22  Yes John Chambers MD   metoprolol succinate XL (TOPROL-XL) 100 mg 24 hr tablet metoprolol succinate  mg tablet,extended release 24 hr   TAKE 1 TABLET BY MOUTH EVERY DAY 4/22/22  Yes John Chambers MD   sildenafiL, pulm.hypertension, (REVATIO) 20 mg tablet sildenafil (pulmonary hypertension) 20 mg tablet   1 TABLET THREE TIMES DAY 4/22/22  Yes John Chambers MD   simvastatin (ZOCOR) 10 mg tablet simvastatin 10 mg tablet   TAKE 1 TABLET BY MOUTH SUNDAY AND WEDNESDAY 3/25/22  Yes John Chambers MD   spironolactone (ALDACTONE) 25 mg tablet spironolactone 25 mg tablet   TAKE 1 TABLET BY MOUTH EVERY DAY 4/22/22  Yes John Chambers MD   amLODIPine (NORVASC) 2.5 mg tablet amlodipine 2.5 mg tablet    John Chambers MD   apixaban (ELIQUIS) 2.5 mg tablet     John Chambers MD   coenzyme Q10 (COQ10) 100 mg capsule coenzyme Q10 100 mg capsule   Take by oral route.    John Chambers MD   hydrochlorothiazide (MICROZIDE) 12.5 mg capsule hydrochlorothiazide 12.5 mg capsule    John Chambers MD   levothyroxine (SYNTHROID) 100 mcg tablet levothyroxine 100 mcg tablet   TAKE 1 TABLET BY MOUTH EVERY DAY    John Chambers MD   omeprazole (PriLOSEC) 20 mg capsule omeprazole 20 mg capsule,delayed release   TAKE 1 CAPSULE BY MOUTH EVERY DAY IN THE MORNING    John Chambers MD   sacubitriL-valsartan (ENTRESTO) 49-51 mg per tablet Entresto 49 mg-51 mg tablet    John Chambers MD   torsemide (DEMADEX) 10 mg tablet torsemide 10 mg tablet   Take 1 tablet as needed by oral route.    ProviderJohn MD       ALLERGIES      Patient has no known allergies.    FAMILY HISTORY      History reviewed. No pertinent family history.    SOCIAL HISTORY      Social History     Socioeconomic History   • Marital status:      Spouse  name: None   • Number of children: None   • Years of education: None   • Highest education level: None   Tobacco Use   • Smoking status: Never Smoker   • Smokeless tobacco: Never Used     Social Determinants of Health     Food Insecurity: No Food Insecurity   • Worried About Running Out of Food in the Last Year: Never true   • Ran Out of Food in the Last Year: Never true       REVIEW OF SYSTEMS      All other systems reviewed and negative except as noted in HPI    PHYSICAL EXAMINATION      Temp:  [36.7 °C (98.1 °F)] 36.7 °C (98.1 °F)  Heart Rate:  [66-74] 68  Resp:  [16-23] 23  BP: (112-122)/(59-92) 119/59  Body mass index is 23.75 kg/m².    Physical Exam     Physical Exam:  VS: as above  General: NAD, AAOx4  HEENT: + JVD, EOMI  Lungs: CTA b/l  Cards: irreg, 3/6 SM murmurs  Abd: Soft, NT, + BS  Ext: 2+ edema, R>L   Neuro: AAOx3, non focal, moving all 4 extremities, ms and sensation intact     LABS / IMAGING / EKG        Labs  I have reviewed the patient's pertinent labs.  Significant abnormals are as above/below .    Imaging  cxr ordered and pending     SARS-CoV-2 (COVID-19) (no units)   Date/Time Value   07/22/2022 2001 Negative       ECG/Telemetry  I have independently reviewed the ECG. Significant findings include irreg, rbbb present .    ASSESSMENT AND PLAN           * Gait disturbance  Assessment & Plan  - Clinical presentation somewhat unusual, bilateral leg weakness, followed by shuffling gait/instability, did have garbled speech per son today and does have a history A. fib not on AC therefore definitely has risk factors for stroke  - Stroke order set, permissive hypertension, continue Plavix, check MRI brain in a.m.  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   - Check accurate weight now, check orthostatics, diuresis pending result.  - Has severe pulmonary hypertension for which we will continue his home sildenafil and vericiguat   -check dvt us     Hypertension  Assessment & Plan  Blood pressure appears to be normal, holding spironolactone and Entresto in the setting of CALI    Atrial fibrillation (CMS/[facility])  Assessment & Plan  He is not on AC due to prior severe GI bleed on AC.  This does put him at risk for CVA  as well  - Rate controlled currently, appears to be A. fib  - Continue metoprolol 100 mg daily       VTE Assessment: Padua    VTE Prophylaxis: Current anticoagulants:  heparin (porcine) 5,000 unit/mL injection 5,000 Units, subcutaneous, q8h IVÁN      Code Status: Full Code  Palliative Care Screening Score: 2   Discussed advanced care planning. Son is surrogate, bedside   Estimated Discharge Date: 7/23/2022  Disposition Planning: pending workup      Natalie Ambrosio, DO  7/23/2022

## 2022-07-23 NOTE — PROGRESS NOTES
Spoke with patient and [facility]or [facility]'s own pharmacy to complete the medication reconciliation.     Prior to admission medication list:    Current Outpatient Medications:   •  clopidogreL (PLAVIX) 75 mg tablet, Take 75 mg by mouth daily.  •  empagliflozin (JARDIANCE) 10 mg tablet, Take 10 mg by mouth daily.  •  metoprolol succinate XL (TOPROL-XL) 100 mg 24 hr tablet, Take 100 mg by mouth daily.  •  sildenafiL, pulm.hypertension, (REVATIO) 20 mg tablet, Take 20 mg by mouth 3 (three) times a day.  •  simvastatin (ZOCOR) 10 mg tablet, Take 10 mg by mouth nightly.  •  spironolactone (ALDACTONE) 25 mg tablet, Take 25 mg by mouth daily.  •  vericiguat (VERQUVO) 2.5 mg tablet tablet, Take 2.5 mg by mouth daily with breakfast.  •  coenzyme Q10 (COQ10) 100 mg capsule, Take 100 mg by mouth daily.  •  levothyroxine (SYNTHROID) 100 mcg tablet, Take 100 mcg by mouth daily.  •  sacubitriL-valsartan (ENTRESTO) 49-51 mg per tablet, Take 1 tablet by mouth 2 (two) times a day.  •  torsemide (DEMADEX) 10 mg tablet, Take 10-20 mg by mouth daily. 10 mg if weight is 176-181 lbs, 20 mg if weight is >181 lbs    Comments about home medications:  -Vericiguat recently started last Tuesday as patient received a 14 day supply from his cardiologist. Patient reports 3 hypotensive/weakness episodes since starting this new medication.   -Omeprazole last filled in May 2022 for a 90 day supply for reflux as needed but pt reports he doesn't require any doses on a regular basis.     Compliance:   -No adherence concerns based patient interview and fill history.

## 2022-07-23 NOTE — ED PROVIDER NOTES
Emergency Medicine Note  HPI   HISTORY OF PRESENT ILLNESS     This is a 89-year-old man with a history of ischemic cardiomyopathy, atrial fibrillation, not on anticoagulation other than Plavix due to GI Bleeding, hypertension presenting to the ER after experiencing several episodes of transient ataxia over the past 3 weeks first, he was at his doctor's office 3 weeks ago when he could not get up out of his chair, his family helped him up and when he did get up he had a shuffling slow gait and felt like he was going to fall down.  This episode lasted 20 minutes.  A very similar episode will be due to later which also was transient only lasting several minutes.  Today he was sent home and he required multiple family emergency department when he did so he felt he was going to fall because he was very off balance.  This episode lasted close to an hour and a half and is since completely resolved.  Upon arrival to the emergency department he has no symptoms and states that he feels well.  He denies any headaches, visual disturbances, weakness in any of his extremities, shortness of breath or chest pain.  He has no other complaints or concerns            Patient History   PAST HISTORY     Reviewed from Nursing Triage:         Past Medical History:   Diagnosis Date   • Atrial fibrillation (CMS/HCC)    • Disease of thyroid gland    • Hypertension    • Myocardial infarction (CMS/HCC)        Past Surgical History:   Procedure Laterality Date   • BYPASS GRAFT         History reviewed. No pertinent family history.    Social History     Tobacco Use   • Smoking status: Never Smoker   • Smokeless tobacco: Never Used         Review of Systems   REVIEW OF SYSTEMS     Review of Systems   Constitutional: Negative for activity change, diaphoresis and fever.   HENT: Negative for facial swelling and sore throat.    Eyes: Negative for visual disturbance.   Respiratory: Negative for cough, shortness of breath and wheezing.     Cardiovascular: Negative for chest pain and leg swelling.   Gastrointestinal: Negative for abdominal pain, diarrhea, nausea and vomiting.   Genitourinary: Negative for difficulty urinating and hematuria.   Musculoskeletal: Positive for gait problem. Negative for back pain and neck pain.   Skin: Negative for color change and pallor.   Neurological: Positive for weakness. Negative for seizures, syncope, speech difficulty and headaches.   Psychiatric/Behavioral: Negative for agitation and behavioral problems.   All other systems reviewed and are negative.        VITALS     ED Vitals    Date/Time Temp Pulse Resp BP SpO2 [facility]   07/22/22 2300 -- 68 23 119/59 95 % [facility]   07/22/22 2201 -- 66 22 113/92 96 % [facility]   07/22/22 2100 -- 72 19 112/63 97 % [facility]   07/22/22 1953 36.7 °C (98.1 °F) 74 16 122/65 95 % [facility]                       Physical Exam   PHYSICAL EXAM     Physical Exam  Vitals and nursing note reviewed.   Constitutional:       Appearance: He is well-developed.   HENT:      Head: Normocephalic and atraumatic.   Eyes:      Conjunctiva/sclera: Conjunctivae normal.   Cardiovascular:      Rate and Rhythm: Normal rate and regular rhythm.   Pulmonary:      Effort: Pulmonary effort is normal.      Breath sounds: Normal breath sounds.   Abdominal:      General: There is no distension.      Palpations: Abdomen is soft. There is no mass.      Tenderness: There is no abdominal tenderness.   Musculoskeletal:         General: No tenderness or deformity. Normal range of motion.      Cervical back: Normal range of motion.   Skin:     General: Skin is warm and dry.   Neurological:      General: No focal deficit present.      Mental Status: He is alert. Mental status is at baseline.      Comments: Cranial nerves II through XII are intact.    Normal finger to nose.  No pronator drift.  Normal rapid alternating movements of upper and lower extremities.  5 out of 5 strength of bilateral elbows, shoulders, and  strength.  5 out of 5  strength of bilateral lower extremities at the hips with flexion, knee flexion and extension, and ankles with dorsiflexion and plantarflexion.  No reproducible sensory deficits are present.   Psychiatric:         Behavior: Behavior normal.           PROCEDURES     Procedures     DATA     Results     Procedure Component Value Units Date/Time    UA with reflex culture [[id number]]  (Abnormal) Collected: 07/22/22 2222    Specimen: Urine, Clean Catch Updated: 07/22/22 2239    Narrative:      The following orders were created for panel order UA with reflex culture.  Procedure                               Abnormality         Status                     ---------                               -----------         ------                     UA Reflex to Culture (Ma...[[id number]]  Abnormal            Final result               UA Microscopic[[id number]]               Normal              Final result                 Please view results for these tests on the individual orders.    UA Microscopic [[id number]]  (Normal) Collected: 07/22/22 2222    Specimen: Urine, Clean Catch Updated: 07/22/22 2239     RBC, Urine 0 TO 4 /HPF      WBC, Urine 0 TO 3 /HPF      Squamous Epithelial None Seen /hpf      Hyaline Cast None Seen /lpf      Bacteria, Urine None Seen /HPF     UA Reflex to Culture (Macroscopic) [[id number]]  (Abnormal) Collected: 07/22/22 2222    Specimen: Urine, Clean Catch Updated: 07/22/22 2236     Color, Urine Yellow     Clarity, Urine Clear     Specific Gravity, Urine 1.021     pH, Urine 7.5     Leukocyte Esterase Negative     Comment: Results can be falsely negative due to high specific gravity, some antibiotics, glucose >3 g/dl, or WBC other than neutrophils.        Nitrite, Urine Negative     Protein, Urine +1     Glucose, Urine >=1000 mg/dL      Ketones, Urine Negative mg/dL      Urobilinogen, Urine 8.0 EU/dL      Bilirubin, Urine Negative mg/dL      Blood, Urine Negative     Comment: The sensitivity of the occult blood  test is equivalent to approximately 4 intact RBC/HPF.       SARS-CoV-2 (COVID-19), PCR Nasopharynx [[id number]]  (Normal) Collected: 07/22/22 2001    Specimen: Nasopharyngeal Swab from Nasopharynx Updated: 07/22/22 2125    Narrative:      The following orders were created for panel order SARS-CoV-2 (COVID-19), PCR Nasopharynx.  Procedure                               Abnormality         Status                     ---------                               -----------         ------                     SARS-CoV-2 (COVID-19), P...[[id number]]  Normal              Final result                 Please view results for these tests on the individual orders.    SARS-CoV-2 (COVID-19), PCR Nasopharynx [[id number]]  (Normal) Collected: 07/22/22 2001    Specimen: Nasopharyngeal Swab from Nasopharynx Updated: 07/22/22 2125     SARS-CoV-2 (COVID-19) Negative    Narrative:      Testing performed using real-time PCR for detection of COVID-19. EUA approved validation studies performed on site.     Comprehensive metabolic panel [[id number]]  (Abnormal) Collected: 07/22/22 2001    Specimen: Blood, Venous Updated: 07/22/22 2052     Sodium 134 mEQ/L      Potassium 4.1 mEQ/L      Comment: Results obtained on plasma. Plasma Potassium values may be up to 0.4 mEQ/L less than serum values. The differences may be greater for patients with high platelet or white cell counts.        Chloride 104 mEQ/L      CO2 20 mEQ/L      BUN 30 mg/dL      Creatinine 1.5 mg/dL      Glucose 203 mg/dL      Calcium 8.5 mg/dL      AST (SGOT) 37 IU/L      ALT (SGPT) 15 IU/L      Alkaline Phosphatase 85 IU/L      Total Protein 6.7 g/dL      Comment: Test performed on plasma which typically contains approximately 0.4 g/dL more protein than serum.        Albumin 3.0 g/dL      Bilirubin, Total 1.7 mg/dL      eGFR 44.0 mL/min/1.73m*2      Anion Gap 10 mEQ/L     CBC and differential [[id number]]  (Abnormal) Collected: 07/22/22 2001    Specimen: Blood, Venous Updated: 07/22/22  2013     WBC 7.62 K/uL      RBC 3.80 M/uL      Hemoglobin 13.5 g/dL      Hematocrit 39.1 %      .9 fL      MCH 35.5 pg      MCHC 34.5 g/dL      RDW 14.7 %      Platelets 148 K/uL      MPV 10.0 fL      Differential Type Auto     nRBC 0.0 %      Immature Granulocytes 0.1 %      Neutrophils 75.9 %      Lymphocytes 9.7 %      Monocytes 13.0 %      Eosinophils 0.9 %      Basophils 0.4 %      Immature Granulocytes, Absolute 0.01 K/uL      Neutrophils, Absolute 5.78 K/uL      Lymphocytes, Absolute 0.74 K/uL      Monocytes, Absolute 0.99 K/uL      Eosinophils, Absolute 0.07 K/uL      Basophils, Absolute 0.03 K/uL     [place] Draw Panel [[id number]] Collected: 07/22/22 2001    Specimen: Blood, Venous Updated: 07/22/22 2010    Narrative:      The following orders were created for panel order [place] Draw Panel.  Procedure                               Abnormality         Status                     ---------                               -----------         ------                     RAINBOW LT BLUE[[id number]]                                  In process                   Please view results for these tests on the individual orders.    [employer] LT BLUE [[id number]] Collected: 07/22/22 2001    Specimen: Blood, Venous Updated: 07/22/22 2010          Imaging Results          CT HEAD WITHOUT IV CONTRAST (Final result)  Result time 07/22/22 22:26:04    Final result                 Impression:    IMPRESSION:  No evidence of an acute territorial infarct or intracranial hemorrhage.  Sequela  chronic small vessel ischemic disease.    COMMENT:    Comparison:  None.    TECHNIQUE: CT of the brain was performed and reconstructed/reformatted in the  axial, coronal and sagittal planes.  CT DOSE:  One or more dose reduction techniques (e.g. automated exposure  control, adjustment of the mA and/or kV according to patient size, use of  iterative reconstruction technique) utilized for this examination.    INTRAVENOUS CONTRAST: None    Brain  parenchyma:  Age related involution and  ischemic small vessel changes.  Gray-white matter differentiation is normal.  No evidence of intracranial  hemorrhage, midline shift or other mass effect.  Ventricles and cisterns:  Unchanged in size and configuration.  Cranial carotid arteries: Mural calcification.  Calvarium and extra cranial soft tissues:  No evidence of a displaced  calvarial fracture.   Scalp soft tissue within normal limits.  Visualized paranasal sinuses and mastoid air cells:  Mucosal thickening of the  visualized paranasal sinuses.               Narrative:    CLINICAL HISTORY:  Dizziness, persistent, recurring  Dizziness, persistent/recurrent, cardiac or vascular cause suspected                                ECG 12 lead   ED Interpretation   A fib at  78 bpm.  Normal P waves, normal [place] intervals, RBBB, no axis deviation, no ST abnormalities.  Non-specific TWI            Scoring tools                                 ED Course & MDM   MDM / ED COURSE / CLINICAL IMPRESSIONS / DISPO     MDM  Number of Diagnoses or Management Options  Ataxia  TIA (transient ischemic attack)  Diagnosis management comments: Patient with several coronary artery risk factors including not being anticoagulated with atrial fibrillation presenting with transient episodes of ataxia.  This is concerning for intermittent TIAs.  He is asymptomatic now with a normal head CT.  We will hospitalize for TIA work-up.       Amount and/or Complexity of Data Reviewed  Clinical lab tests: reviewed  Tests in the radiology section of CPT®: reviewed        ED Course as of 07/22/22 2336 Fri Jul 22, 2022 2315 Case was discussed with hospitalist.  Reviewed patient's presentation, ED course, and relevant data.  Hospitalist accepts patient on their service and will see / admit pt. [DB]      ED Course User Index  [DB] Samy Abad PA C         Clinical Impressions as of 07/22/22 2336   Ataxia   TIA (transient ischemic attack)     Admit /  Observation         Samy Abad PA C  07/22/22 5357

## 2022-07-23 NOTE — ASSESSMENT & PLAN NOTE
He is not on AC due to prior severe GI bleed on AC.  This does put him at risk for CVA  as well  - Rate controlled currently, appears to be A. fib  - Continue metoprolol 100 mg daily

## 2022-07-24 PROBLEM — R27.0 ATAXIA: Status: ACTIVE | Noted: 2022-07-24

## 2022-07-24 LAB
GLUCOSE BLD-MCNC: 129 MG/DL (ref 70–99)
GLUCOSE BLD-MCNC: 169 MG/DL (ref 70–99)
POCT TEST: ABNORMAL
POCT TEST: ABNORMAL

## 2022-07-24 PROCEDURE — 97166 OT EVAL MOD COMPLEX 45 MIN: CPT | Mod: GO

## 2022-07-24 PROCEDURE — 97162 PT EVAL MOD COMPLEX 30 MIN: CPT | Mod: GP

## 2022-07-24 PROCEDURE — 63700000 HC SELF-ADMINISTRABLE DRUG: Performed by: HOSPITALIST

## 2022-07-24 PROCEDURE — 21400000 HC ROOM AND CARE CCU/INTERMEDIATE

## 2022-07-24 PROCEDURE — G0378 HOSPITAL OBSERVATION PER HR: HCPCS

## 2022-07-24 PROCEDURE — 99233 SBSQ HOSP IP/OBS HIGH 50: CPT | Performed by: INTERNAL MEDICINE

## 2022-07-24 RX ADMIN — SILDENAFIL CITRATE 20 MG: 20 TABLET ORAL at 15:26

## 2022-07-24 RX ADMIN — CLOPIDOGREL BISULFATE 75 MG: 75 TABLET ORAL at 09:51

## 2022-07-24 RX ADMIN — SACUBITRIL AND VALSARTAN 1 TABLET: 49; 51 TABLET, FILM COATED ORAL at 20:52

## 2022-07-24 RX ADMIN — LEVOTHYROXINE SODIUM 100 MCG: 0.1 TABLET ORAL at 06:10

## 2022-07-24 RX ADMIN — EMPAGLIFLOZIN 10 MG: 10 TABLET, FILM COATED ORAL at 09:52

## 2022-07-24 RX ADMIN — SILDENAFIL CITRATE 20 MG: 20 TABLET ORAL at 09:51

## 2022-07-24 RX ADMIN — SACUBITRIL AND VALSARTAN 1 TABLET: 49; 51 TABLET, FILM COATED ORAL at 09:51

## 2022-07-24 RX ADMIN — METOPROLOL SUCCINATE 100 MG: 100 TABLET, EXTENDED RELEASE ORAL at 09:51

## 2022-07-24 RX ADMIN — SPIRONOLACTONE 25 MG: 25 TABLET ORAL at 09:50

## 2022-07-24 ASSESSMENT — COGNITIVE AND FUNCTIONAL STATUS - GENERAL
EATING MEALS: 4 - NONE
DRESSING REGULAR UPPER BODY CLOTHING: 3 - A LITTLE
HELP NEEDED FOR BATHING: 3 - A LITTLE
STANDING UP FROM CHAIR USING ARMS: 3 - A LITTLE
AFFECT: WFL
DRESSING REGULAR LOWER BODY CLOTHING: 3 - A LITTLE
CLIMB 3 TO 5 STEPS WITH RAILING: 3 - A LITTLE
AFFECT: WFL
MOVING TO AND FROM BED TO CHAIR: 3 - A LITTLE
WALKING IN HOSPITAL ROOM: 3 - A LITTLE
TOILETING: 3 - A LITTLE
HELP NEEDED FOR PERSONAL GROOMING: 3 - A LITTLE

## 2022-07-24 NOTE — PLAN OF CARE
Problem: Adult Inpatient Plan of Care  Goal: Plan of Care Review  Outcome: Progressing  Flowsheets (Taken 7/24/2022 1236)  Progress: improving  Plan of Care Reviewed With: (Medical rounds) other (see comments)   Per info in medical rounds pt is medically stable for d/c today pending SNF placement. SW s/w pt's sons Marko [phone] and Watson [phone] to provide SNF choice. Family interested in TCC @ [facility] , SW notified family that a bed is not available at [facility] this weekend. Family also interested in TCC @ [facility]. SW sent referrals to both facilities, SW now awaiting a response. Weekday SW follow up with facilities to determine acceptance.     SW will continue to provide emotional support and follow up for d/c planning. Adria PALOMINO, LSW .

## 2022-07-24 NOTE — PROGRESS NOTES
Patient: Samy Elena  Location:  [place] [address] [facility]  MRN:  [id number]  Today's date:  7/24/2022    Pt and RN agreeable to PT. Pt OOB in chair with chair alarm activated, call bell in reach, personal items in reach, RN aware of pt performance  Samy is a 89 y.o. male admitted on 7/22/2022 with TIA (transient ischemic attack) [G45.9]  Ataxia [R27.0]  Leg edema, right [R60.0]. Principal problem is Gait disturbance.    Past Medical History  Samy has a past medical history of Atrial fibrillation (CMS/HCC), Disease of thyroid gland, Hypertension, and Myocardial infarction (CMS/HCC).    History of Present Illness   Adm with gait dysfunction, garbled speech episodes (occuring 3 times over last 2 weeks).MRI  Brain without contrast: 7/23/2022 :- No acute abnormalities, including no acute stroke or hemorrhage noted.- Mild chronic white matter changes.- Moderately advanced diffuse cerebral and cerebellar atrophy noted. Symptoms likely hypotension in the setting of vericiguat and sildenafil drug interaction         PT Vitals    Date/Time Pulse HR Source BP BP Location BP Method Pt Position [facility]   07/24/22 0855 78 Monitor 104/64 Right upper arm Automatic Sitting KW   07/24/22 0915 94 Monitor 122/69 Right upper arm Automatic Standing KW      PT Pain    Date/Time Pain Type Rating: Rest Rating: Activity [facility]   07/24/22 0855 Pain Assessment 0 - no pain 0 - no pain KW          Prior Living Environment    Flowsheet Row Most Recent Value   People in Home alone   Current Living Arrangements home   Home Accessibility stairs to enter home (Group), stairs within home (Group), stair glide  [glide being instaled this week]   Number of Stairs, Main Entrance 3   Stair Railings, Main Entrance railings on both sides of stairs   Location, Patient Bedroom second floor, must negotiate stairs to access   Location, Bathroom first (main) floor, second floor, must negotiate stairs to access  [MT 1st]   Number of Stairs, Within  "Home, Primary 12        Prior Level of Function    Flowsheet Row Most Recent Value   Ambulation assistive equipment  [SPC]   Transferring independent   Toileting independent   Bathing independent   Dressing independent   Eating independent   Prior Level of Function Comment Has \"\" who manages errands, housekeeping, laundry, makes sure he takes his pills   Assistive Device Currently Used at Home --  [stair glide being installed this week, has RW, SPC, QC but only has been using SPC. Has medialert system in bathroom as well as bracelet]           PT Evaluation and Treatment - 07/24/22 0855        PT Time Calculation    Start Time 0855     Stop Time 0920     Time Calculation (min) 25 min        Session Details    Document Type initial evaluation     Mode of Treatment physical therapy        General Information    General Observations of Patient sitting EOB eating breakfast, NAD     Existing Precautions/Restrictions fall        Cognition/Psychosocial    Affect/Mental Status (Cognition) WFL     Orientation Status (Cognition) oriented x 3     Follows Commands (Cognition) WFL     Cognitive Function WFL        Sensory    Hearing Status hearing impairment requiring augmentation        Vision Assessment/Intervention    Visual Impairment/Limitations corrective lenses full-time        Sensory Assessment (Somatosensory)    Left LE Sensory Assessment general sensation;intact     Right LE Sensory Assessment general sensation;intact        Range of Motion (ROM)    Left Lower Extremity (ROM) left LE ROM is WFL     Right Lower Extremity (ROM) right LE ROM is WFL        Strength Comprehensive (MMT)    Comment demonstrates antigravity strength b/l, no focal weakness        Bed Mobility    [place], Supine to Sit not tested     [place], Sit to Supine not tested     Comment (Bed Mobility) sitting EOB        Sit to Stand Transfer    [place], Sit to Stand Transfer touching/steadying assist     Assistive Device " walker, front-wheeled        Stand to Sit Transfer    [place], Stand to Sit Transfer touching/steadying assist     Assistive Device walker, front-wheeled        Gait Training    [place], Gait touching/steadying assist     Assistive Device walker, front-wheeled     Distance in Feet 100 feet   brief standing rest followed by 100'    Deviations/Abnormal Patterns (Gait) other (see comments)   initially shuffling with decr step length RLE. A bit apraxic       Stairs Training    [place], Stairs not tested        Balance    Static Sitting Balance WFL;unsupported;sitting, edge of bed     Dynamic Sitting Balance WFL;unsupported;sitting, edge of bed     Static Standing Balance WFL;supported     Dynamic Standing Balance mild impairment;supported        Motor Skills    Functional Endurance fair        AM-PAC (TM) - Mobility (Current Function)    Turning from your back to your side while in a flat bed without using bedrails? 4 - None     Moving from lying on your back to sitting on the side of a flat bed without using bedrails? 4 - None     Moving to and from a bed to a chair? 3 - A Little     Standing up from a chair using your arms? 3 - A Little     To walk in a hospital room? 3 - A Little     Climbing 3-5 steps with a railing? 3 - A Little     AM-PAC (TM) Mobility Score 20        Assessment/Plan (PT)    Daily Outcome Statement pt presents with improvement in symptoms that brought him to the hospital however has become deconditioned with decr balance, decr enruance and some gait apraxia. Pt lives alone and would benefit from sontinued skilled therapy services to maximize his endurance and regain prior level of indep with functional mobility and ADLs prior to return home.     Rehab Potential good, to achieve stated therapy goals     Therapy Frequency 5 times/wk     Planned Therapy Interventions bed mobility training;gait training;home exercise program;patient/family education;stair training;strengthening;transfer  training               PT Assessment/Plan    Flowsheet Row Most Recent Value   PT Recommended Discharge Disposition other (see comments)  [TCC] at 07/24/2022 0855   Anticipated Equipment Needs at Discharge (PT) none at 07/24/2022 0855   Patient/Family Therapy Goals Statement to be stronger befor he goes home at 07/24/2022 0855                    Education Documentation  Treatment Plan, taught by Amber Brenner, PT at 7/24/2022 11:56 AM.  Learner: Patient  Readiness: Acceptance  Method: Explanation  Response: Verbalizes Understanding  Comment: role of PT, plan of care          PT Goals    Flowsheet Row Most Recent Value   Bed Mobility Goal 1    Activity/Assistive Device rolling to left, rolling to right, sit to supine, supine to sit at 07/24/2022 0855   [place] modified independence at 07/24/2022 0855   Time Frame by discharge at 07/24/2022 0855   Progress/Outcome goal ongoing at 07/24/2022 0855   Transfer Goal 1    Activity/Assistive Device sit-to-stand/stand-to-sit, walker, front-wheeled at 07/24/2022 0855   [place] modified independence at 07/24/2022 0855   Time Frame by discharge at 07/24/2022 0855   Progress/Outcome goal ongoing at 07/24/2022 0855   Gait Training Goal 1    Activity/Assistive Device gait (walking locomotion), walker, front-wheeled at 07/24/2022 0855   [place] modified independence at 07/24/2022 0855   Distance 250 at 07/24/2022 0855   Time Frame by discharge at 07/24/2022 0855   Progress/Outcome goal ongoing at 07/24/2022 0855   Stairs Goal 1    Activity/Assistive Device ascending stairs, descending stairs, using handrail, left, using handrail, right at 07/24/2022 0855   [place] supervision required at 07/24/2022 0855   Number of Stairs 3 at 07/24/2022 0855   Time Frame by discharge at 07/24/2022 0855   Progress/Outcome goal ongoing at 07/24/2022 0855

## 2022-07-24 NOTE — HOSPITAL COURSE
Samy is a 89 y.o. male admitted on 7/22/2022 with TIA (transient ischemic attack) [G45.9]  Ataxia [R27.0]  Leg edema, right [R60.0]. Principal problem is Gait disturbance.    Past Medical History  Samy has a past medical history of Atrial fibrillation (CMS/HCC), Disease of thyroid gland, Hypertension, and Myocardial infarction (CMS/HCC).    History of Present Illness   Adm with gait dysfunction, garbled speech episodes (occuring 3 times over last 2 weeks).MRI  Brain without contrast: 7/23/2022 :- No acute abnormalities, including no acute stroke or hemorrhage noted.- Mild chronic white matter changes.- Moderately advanced diffuse cerebral and cerebellar atrophy noted. Symptoms likely hypotension in the setting of vericiguat and sildenafil drug interaction

## 2022-07-24 NOTE — PLAN OF CARE
Problem: Adult Inpatient Plan of Care  Goal: Plan of Care Review  Outcome: Progressing  Flowsheets (Taken 7/24/2022 1156)  Progress: improving  Plan of Care Reviewed With: patient  Outcome Summary: PT eval     Problem: Mobility Impairment  Goal: Optimal Mobility  Outcome: Progressing

## 2022-07-24 NOTE — PROGRESS NOTES
Hospital Medicine Service -  Daily Progress Note       SUBJECTIVE   Interval History: He feels well, BP stable, did well with PT, but would benefit from TCC. Seen with them at bedside.     OBJECTIVE      Vital signs in last 24 hours:  Temp:  [36.4 °C (97.6 °F)-36.8 °C (98.3 °F)] 36.7 °C (98 °F)  Heart Rate:  [58-94] 75  Resp:  [18] 18  BP: (100-132)/(57-80) 101/57    Intake/Output Summary (Last 24 hours) at 7/24/2022 1357  Last data filed at 7/24/2022 0519  Gross per 24 hour   Intake --   Output 500 ml   Net -500 ml       PHYSICAL EXAMINATION      Physical Exam  General: NAD, comfortable  HEENT: + JVD, EOMI, MMM  Lungs: CTA b/l  Cards: irreg irreg, 3/6 systolic murmur at base  Abd: Soft, NT, ND, + BS  Ext: 2+ edema, R>L   Neuro: AAOx3, non focal, moving all 4 extremities, ms and sensation intact    LINES, CATHETERS, DRAINS, AIRWAYS, AND WOUNDS   Lines, Drains, and Airways:  Wounds (agree with documentation and present on admission):  Peripheral IV (Adult) 07/22/22 Left Forearm (Active)   Number of days: 2         Comments:      LABS / IMAGING / TELE      Labs  Lab Results   Component Value Date    WBC 8.14 07/23/2022    HGB 12.6 (L) 07/23/2022    HCT 37.4 (L) 07/23/2022    .5 (H) 07/23/2022     (L) 07/23/2022     Lab Results   Component Value Date    GLUCOSE 112 (H) 07/23/2022    CALCIUM 8.6 (L) 07/23/2022     07/23/2022    K 3.8 07/23/2022    CO2 21 (L) 07/23/2022     07/23/2022    BUN 27 (H) 07/23/2022    CREATININE 1.3 07/23/2022     SARS-CoV-2 (COVID-19) (no units)   Date/Time Value   07/22/2022 2001 Negative       Imaging  I have independently reviewed the pertinent imaging from the last 24 hrs.    ECG/Telemetry  I have independently reviewed the telemetry. No events for the last 24 hours.    ASSESSMENT AND PLAN      Hyperglycemia  Assessment & Plan  -A1c 6.3, outpatient f/u    Ischemic cardiomyopathy  Assessment & Plan  - He denies any new or worsening symptoms and is at baseline,  stable R>L BLE edema  - Restarted torsemide/ spironolactone/ ARNI  - Has severe pulmonary hypertension for which we will continue his home sildenafil and hold his vericiguat as above   -- US negative for RLE DVT    Hypertension  Assessment & Plan  Blood pressure appears to be normal, continue spironolactone and Entresto    Atrial fibrillation (CMS/HCC)  Assessment & Plan  He is not on AC due to prior severe GI bleed on AC.  This does put him at risk for CVA  as well  - Rate controlled currently, appears to be A. fib  - Continue metoprolol 100 mg daily    * Gait disturbance  Assessment & Plan  - MRI negative for acute infarct. Discussed with Dr. Chapman of neurology- this is likely hypotension in the setting of vericiguat and sildenafil. Will stop vericiguat and monitor BPs. Will need outpatient cardiology f/u.  - Monitor on telemetry  -- PT/ OT recommending TCC (or [place] [facility]). Awaiting bed.       VTE Assessment: [place]    VTE Prophylaxis:  Current anticoagulants:  heparin (porcine) 5,000 unit/mL injection 5,000 Units, subcutaneous, q8h IVÁN    Code Status: Full Code  Palliative Care Screening Score: 2   Estimated Discharge Date: 7/25/2022     Disposition Planning: TCC     Cindy Blake MD  7/24/2022

## 2022-07-24 NOTE — UM PHYSICIAN REVIEW NOTE
89 yo with CHF presenting with gait disturbance thought to be due to medications. Still with ongoing issues, needs ongoing PT and work on BP given remains low. Care has crossed 2MN and inpatient status is appropriate.     Andrae Morris MD

## 2022-07-24 NOTE — PROGRESS NOTES
Patient:  Samy Elena  Location:  [facility]  MRN:  [id number]  Today's date:  7/24/2022    Therapy session completed with pt and nsg consent Pt rec'd amb in halls with PT and left in room in chair, call bell in reach, chair alarmed and medical team in room     Samy is a 89 y.o. male admitted on 7/22/2022 with TIA (transient ischemic attack) [G45.9]  Ataxia [R27.0]  Leg edema, right [R60.0]. Principal problem is Gait disturbance.    Past Medical History  Samy has a past medical history of Atrial fibrillation (CMS/HCC), Disease of thyroid gland, Hypertension, and Myocardial infarction (CMS/HCC).    History of Present Illness   Adm with gait dysfunction, garbled speech episodes (occuring 3 times over last 2 weeks).MRI  Brain without contrast: 7/23/2022 :- No acute abnormalities, including no acute stroke or hemorrhage noted.- Mild chronic white matter changes.- Moderately advanced diffuse cerebral and cerebellar atrophy noted. Symptoms likely hypotension in the setting of vericiguat and sildenafil drug interaction         OT Vitals    Date/Time Pulse HR Source BP BP Location BP Method Pt Position Who   07/24/22 0915 94 Monitor 122/69 Right upper arm Automatic Standing KW          Prior Living Environment    Flowsheet Row Most Recent Value   People in Home alone   Current Living Arrangements home   Home Accessibility stairs to enter home (Group), stairs within home (Group), stair glide  [glide being instaled this week]   Number of Stairs, Main Entrance 3   Stair Railings, Main Entrance railings on both sides of stairs   Location, Patient Bedroom second floor, must negotiate stairs to access   Location, Bathroom first (main) floor, second floor, must negotiate stairs to access  [SD 1st]   Number of Stairs, Within Home, Primary 12        Prior Level of Function    Flowsheet Row Most Recent Value   Dominant Hand right   Ambulation assistive equipment  [SPC]   Transferring independent  "  Toileting independent   Bathing independent   Dressing independent   Eating independent   Prior Level of Function Comment Has \"\" who manages errands, housekeeping, laundry, makes sure he takes his pills   Assistive Device Currently Used at Home --  [stair glide being installed this week, has RW, SPC, QC but only has been using SPC. 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 [place]           OT Evaluation and Treatment - 07/24/22 0907        OT Time Calculation    Start Time 0907     Stop Time 0920     Time Calculation (min) 13 min        Session Details    Document Type initial evaluation     Mode of Treatment occupational therapy        General Information    Patient Profile Reviewed yes     Existing Precautions/Restrictions fall        Cognition/Psychosocial    Affect/Mental Status (Cognition) WFL     Orientation Status (Cognition) oriented x 3     Follows Commands (Cognition) WFL     Cognitive Function WFL     Comment, Cognition pleasant and cooperative, engaged in therapy session        Sensory Assessment (Somatosensory)    Sensory Assessment (Somatosensory) sensation intact;UE sensation intact        Range of Motion (ROM)    Range of Motion bilateral upper extremities;ROM is WFL        Strength (Manual Muscle Testing)    Strength (Manual Muscle Testing) strength is WFL;bilateral upper extremities        Bed Mobility    Comment (Bed Mobility) rec'd mobilizing with PT in hallway        Sit to Stand Transfer    [place], Sit to Stand Transfer minimum assist (75% or more patient effort)     Assistive Device walker, front-wheeled        Stand to Sit Transfer    [place], Stand to Sit Transfer minimum assist (75% or more patient effort);increased time to complete;safety considerations;verbal cues     Assistive " Device walker, front-wheeled     Comment to recliner chair        Toilet Transfer    Transfer Technique sit-stand;stand-sit     [place], Toilet Transfer minimum assist (75% or more patient effort);increased time to complete;safety considerations;verbal cues     Verbal Cues hand placement;safety;technique;preparatory posture     Assistive Device grab bars/safety frame   x 1 on left    Comment cues for walker management in bathroom, tends to leave behind and attempt to walk around walker to get to toilet        Safety Issues, Functional Mobility    Safety Issues Affecting Function (Mobility) insight into deficits/self-awareness;positioning of assistive device     Impairments Affecting Function (Mobility) balance;endurance/activity tolerance        Balance    Static Sitting Balance WFL     Dynamic Sitting Balance WFL     Static Standing Balance WFL     Dynamic Standing Balance mild impairment;supported;standing     Comment, Balance benefits from rW for balance and ECON        Motor Skills    Functional Endurance dec from functional baseline, mild SOLIS with mob and selfcare tasks        Lower Body Dressing    Tasks don;socks     [place] minimum assist (75% or more patient effort);increased time to complete;safety considerations;verbal cues     Position supported sitting     Comment +solis with functional reach        Grooming    Self-Performance washes, rinses and dries hands;washes, rinses and dries face     [place] close supervision     Position sink side;supported sitting     Comment standing luisa x 4 min with fair dynamic unilateral support balance        Toileting    [place] minimum assist (75% or more patient effort);increased time to complete;safety considerations;verbal cues     Position supported sitting;supported standing     Comment mild dec balance with proximal CM adj and posterior reach        AM-PAC (TM) - ADL (Current Function)    Putting on and taking off regular lower body clothing? 3 -  A Little     Bathing? 3 - A Little     Toileting? 3 - A Little     Putting on/taking off regular upper body clothing? 3 - A Little     How much help for taking care of personal grooming? 3 - A Little     Eating meals? 4 - None     AM-PAC (TM) ADL Score 19        Assessment/Plan (OT)    Daily Outcome Statement OT eval completed. pt currently requires min A/CL supervisoin with all functional activites limited by dec cardiopulmonary endurance and mild dec dyanmic balance Pt will benefit from short stay in TCC for increased activity luisa and confidence.Pt reports she is getting  stair glide put in this week which will assist with safety on discharge     Rehab Potential good, to achieve stated therapy goals     Therapy Frequency 3-5 times/wk     Planned Therapy Interventions activity tolerance training;BADL retraining;functional balance retraining;IADL retraining;patient/caregiver education/training;transfer/mobility retraining               OT Assessment/Plan    Flowsheet Row Most Recent Value   OT Recommended Discharge Disposition skilled nursing facility  [TCC, if not bed available TCC will benefit from home with 1st floor setup whilst await stair glide adn family support with IADLS] at 07/24/2022 0907   Anticipated Equipment Needs At Discharge (OT) --  [has requried] at 07/24/2022 0907   Patient/Family Therapy Goal Statement to feel safer and more confident prior to discharge home at 07/24/2022 0907                    Education Documentation  Treatment Plan, taught by Fabienne Call, OT at 7/24/2022  3:23 PM.  Learner: Patient  Readiness: Acceptance  Method: Explanation, Demonstration  Response: Verbalizes Understanding, Demonstrated Understanding  Comment: role/goal  of OT , therapy plan of care, impt of OOB and call bell use          OT Goals    Flowsheet Row Most Recent Value   Bed Mobility Goal 1    Activity/Assistive Device bed mobility activities, all at 07/24/2022 0907   [place] modified independence  at 07/24/2022 0907   Time Frame by discharge at 07/24/2022 0907   Progress/Outcome goal ongoing at 07/24/2022 0907   Transfer Goal 1    Activity/Assistive Device all transfers at 07/24/2022 0907   [place] modified independence at 07/24/2022 0907   Time Frame by discharge at 07/24/2022 0907   Progress/Outcome goal ongoing at 07/24/2022 0907   Dressing Goal 1    Activity/Adaptive Equipment dressing skills, all at 07/24/2022 0907   [place] modified independence at 07/24/2022 0907   Time Frame by discharge at 07/24/2022 0907   Progress/Outcome goal ongoing at 07/24/2022 0907   Toileting Goal 1    Activity/Assistive Device toileting skills, all at 07/24/2022 0907   [place] modified independence at 07/24/2022 0907   Time Frame by discharge at 07/24/2022 0907   Progress/Outcome discharged from facility at 07/24/2022 0907

## 2022-07-24 NOTE — PLAN OF CARE
Problem: Adult Inpatient Plan of Care  Goal: Plan of Care Review  Outcome: Progressing  Flowsheets (Taken 7/24/2022 1751)  Progress: improving  Plan of Care Reviewed With:   patient   son  Outcome Summary: Pt refusing some meds, walked with PT today.  Goal: Patient-Specific Goal (Individualized)  Outcome: Progressing  Flowsheets (Taken 7/24/2022 1751)  Patient-Specific Goals (Include Timeframe): Discharge when medically stable  Individualized Care Needs: Fall/safety precautions  Anxieties, Fears or Concerns: Pt anxious about being given diuretics because they are inconvenient   Plan of Care Review  Plan of Care Reviewed With: patient, son  Progress: improving  Outcome Summary: Pt refusing some meds, walked with PT today.

## 2022-07-24 NOTE — PLAN OF CARE
Problem: Adult Inpatient Plan of Care  Goal: Plan of Care Review  Outcome: Progressing  Flowsheets (Taken 7/24/2022 6863)  Progress: improving  Plan of Care Reviewed With: patient  Outcome Summary: Pt with no c/o dizziness, lightheadedness or pain. Pt OOB to BR with assist, gait unsteady. PT with bed alarm on, call bell in reach. Will continue to monitor.

## 2022-07-25 VITALS
TEMPERATURE: 97.4 F | WEIGHT: 178.7 LBS | HEIGHT: 73 IN | SYSTOLIC BLOOD PRESSURE: 110 MMHG | RESPIRATION RATE: 18 BRPM | HEART RATE: 68 BPM | BODY MASS INDEX: 23.68 KG/M2 | DIASTOLIC BLOOD PRESSURE: 65 MMHG | OXYGEN SATURATION: 97 %

## 2022-07-25 LAB
BACTERIA URNS QL MICRO: ABNORMAL /HPF
BILIRUB UR QL STRIP.AUTO: NEGATIVE MG/DL
CLARITY UR REFRACT.AUTO: CLEAR
COLOR UR AUTO: YELLOW
GLUCOSE UR STRIP.AUTO-MCNC: >=1000 MG/DL
HGB UR QL STRIP.AUTO: ABNORMAL
HYALINE CASTS #/AREA URNS LPF: ABNORMAL /LPF
KETONES UR STRIP.AUTO-MCNC: NEGATIVE MG/DL
LEUKOCYTE ESTERASE UR QL STRIP.AUTO: NEGATIVE
MUCOUS THREADS URNS QL MICRO: ABNORMAL /LPF
NITRITE UR QL STRIP.AUTO: NEGATIVE
PH UR STRIP.AUTO: 5.5 [PH]
PROT UR QL STRIP.AUTO: ABNORMAL
RBC #/AREA URNS HPF: ABNORMAL /HPF
SARS-COV-2 RNA RESP QL NAA+PROBE: NEGATIVE
SP GR UR REFRACT.AUTO: 1.03
SQUAMOUS URNS QL MICRO: ABNORMAL /HPF
UROBILINOGEN UR STRIP-ACNC: 2 EU/DL
WBC #/AREA URNS HPF: ABNORMAL /HPF

## 2022-07-25 PROCEDURE — U0002 COVID-19 LAB TEST NON-CDC: HCPCS | Performed by: STUDENT IN AN ORGANIZED HEALTH CARE EDUCATION/TRAINING PROGRAM

## 2022-07-25 PROCEDURE — 63700000 HC SELF-ADMINISTRABLE DRUG: Performed by: HOSPITALIST

## 2022-07-25 PROCEDURE — 99239 HOSP IP/OBS DSCHRG MGMT >30: CPT | Performed by: STUDENT IN AN ORGANIZED HEALTH CARE EDUCATION/TRAINING PROGRAM

## 2022-07-25 PROCEDURE — 81003 URINALYSIS AUTO W/O SCOPE: CPT | Performed by: STUDENT IN AN ORGANIZED HEALTH CARE EDUCATION/TRAINING PROGRAM

## 2022-07-25 RX ADMIN — SILDENAFIL CITRATE 20 MG: 20 TABLET ORAL at 09:22

## 2022-07-25 RX ADMIN — LEVOTHYROXINE SODIUM 100 MCG: 0.1 TABLET ORAL at 06:05

## 2022-07-25 RX ADMIN — SACUBITRIL AND VALSARTAN 1 TABLET: 49; 51 TABLET, FILM COATED ORAL at 09:24

## 2022-07-25 RX ADMIN — SILDENAFIL CITRATE 20 MG: 20 TABLET ORAL at 13:38

## 2022-07-25 RX ADMIN — CLOPIDOGREL BISULFATE 75 MG: 75 TABLET ORAL at 09:22

## 2022-07-25 RX ADMIN — SPIRONOLACTONE 25 MG: 25 TABLET ORAL at 09:22

## 2022-07-25 RX ADMIN — EMPAGLIFLOZIN 10 MG: 10 TABLET, FILM COATED ORAL at 09:24

## 2022-07-25 RX ADMIN — METOPROLOL SUCCINATE 100 MG: 100 TABLET, EXTENDED RELEASE ORAL at 09:22

## 2022-07-25 NOTE — DISCHARGE INSTRUCTIONS
Mr. Elena,    You came to the hospital for weakness, and had scans done an were evaluated by the neurologists. It was found that your symptoms were likely due to low blood pressure and you did not have signs of a stroke. We stopped your vericiguat and recommend that you do not take it at this time.    Please:   1) See your primary care doctor in 1-2 weeks  2) See your cardiologist in one week  3) Please have your urine rechecked if you continue to have increased urination  4) If you have return of the symptoms that brought you to the hospital, please come back!    Thank you for allowing us to participate in your care!

## 2022-07-25 NOTE — DISCHARGE SUMMARY
Inpatient Discharge Summary    BRIEF OVERVIEW  Admitting Provider:  H&P Notes 6/25/2022 to 7/25/2022         Date of Service   Author Author Type Status Note Type File Time    07/22/22 2323  Natalie Ambrosio DO Physician Signed H&P 07/23/22 0028          Attending Provider: Tabby Nunez,* Attending phys phone: [phone]  Primary Care Physician at Discharge: Zachery Hernandez [phone]    Admission Date: 7/22/2022     Discharge Date: 7/25/2022    Primary Discharge Diagnosis  Gait disturbance    Secondary Discharge Diagnosis  Active Hospital Problems    Diagnosis Date Noted   • Ataxia 07/24/2022   • Gait disturbance 07/23/2022   • Atrial fibrillation (CMS/HCC)    • Hypertension    • Ischemic cardiomyopathy    • Hyperglycemia       Resolved Hospital Problems   No resolved problems to display.       DETAILS OF HOSPITAL STAY    Operative Procedures Performed      Consults:   Consult Notes 6/25/2022 to 7/25/2022         Date of Service   Author Author Type Status Note Type File Time    07/23/22 1058  Awa Chapman MD Physician Signed Consults 07/23/22 1438          Consult Orders During Admission:  IP CONSULT TO NEUROLOGY     Procedures: n/a    Pertinent Test Results: Hemoglobin 6.3    Imaging  CT HEAD WITHOUT IV CONTRAST    Result Date: 7/22/2022  IMPRESSION: No evidence of an acute territorial infarct or intracranial hemorrhage.  Sequela chronic small vessel ischemic disease. COMMENT: Comparison:  None. TECHNIQUE: CT of the brain was performed and reconstructed/reformatted in the axial, coronal and sagittal planes. CT DOSE:  One or more dose reduction techniques (e.g. automated exposure control, adjustment of the mA and/or kV according to patient size, use of iterative reconstruction technique) utilized for this examination. INTRAVENOUS CONTRAST: None Brain parenchyma:  Age related involution and  ischemic small vessel changes. Gray-white matter differentiation is normal.  No evidence of  intracranial hemorrhage, midline shift or other mass effect. Ventricles and cisterns:  Unchanged in size and configuration. Cranial carotid arteries: Mural calcification. Calvarium and extra cranial soft tissues:  No evidence of a displaced calvarial fracture.   Scalp soft tissue within normal limits. Visualized paranasal sinuses and mastoid air cells:  Mucosal thickening of the visualized paranasal sinuses.     MRI ANGIOGRAM HEAD WITHOUT CONTRAST    Result Date: 7/23/2022  IMPRESSION: 1. No acute intracranial abnormality, no acute infarct. 2. Mild microangiopathic changes. 3. 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 4. Unremarkable MRA of the neck. COMMENT: Technique:  The brain was scanned in multiple planes with a variety of pulse sequences without contrast. Comparison:  Head CT dated July 22, 2022. Findings: Mild scattered periventricular and subcortical white matter T2 hyperintensities, nonspecific, likely sequela of microangiopathic disease. Sulci, ventricles and basal cisterns are within normal limits for patient's stated age. No mass-effect, intracranial hemorrhage, acute segmental infarct or extra-axial fluid collection is seen. Cerebellar tonsils are normal in position. Expected signal voids are seen in the intracranial vessels at the skull base. The orbits and sella are unremarkable.   The paranasal sinuses demonstrates circumferential mucosal thickening of the right maxillary sinus.  The mastoid air cells are clear. MR angiography head and neck without contrast Technique: An MRA of the [place] was performed utilizing a 3D time-of-flight technique. MRA of the neck was also performed utilizing 2-D and 3-D time-of-flight technique. No prior studies are available for comparison. Findings: MR angiography of the head marked irregularity throughout the basilar artery with a moderate to severe narrowing  of the proximal basilar artery. Left vertebral artery is dominant. The right vertebral artery ends in PICA. Near fetal origin left PCA. No medium or large size aneurysms are seen in the [place]. Please note that MRA may be insensitive in the detection of aneurysms smaller than 4 mm. MRA of the neck demonstrates patency of the common carotid arteries, internal carotid arteries and vertebral arteries bilaterally. No evidence for vessel dissection, cutoff, hemodynamically significant stenoses by NASCET criteria.    MRI ANGIOGRAM NECK WITHOUT CONTRAST    Result Date: 7/23/2022  IMPRESSION: 1. No acute intracranial abnormality, no acute infarct. 2. Mild microangiopathic changes. 3. 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 4. Unremarkable MRA of the neck. COMMENT: Technique:  The brain was scanned in multiple planes with a variety of pulse sequences without contrast. Comparison:  Head CT dated July 22, 2022. Findings: Mild scattered periventricular and subcortical white matter T2 hyperintensities, nonspecific, likely sequela of microangiopathic disease. Sulci, ventricles and basal cisterns are within normal limits for patient's stated age. No mass-effect, intracranial hemorrhage, acute segmental infarct or extra-axial fluid collection is seen. Cerebellar tonsils are normal in position. Expected signal voids are seen in the intracranial vessels at the skull base. The orbits and sella are unremarkable.   The paranasal sinuses demonstrates circumferential mucosal thickening of the right maxillary sinus.  The mastoid air cells are clear. MR angiography head and neck without contrast Technique: An MRA of the [place] was performed utilizing a 3D time-of-flight technique. MRA of the neck was also performed utilizing 2-D and 3-D time-of-flight technique. No prior studies are available for comparison. 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 Left vertebral artery is dominant. The right vertebral artery ends in PICA. Near fetal origin left PCA. No medium or large size aneurysms are seen in the [place]. Please note that MRA may be insensitive in the detection of aneurysms smaller than 4 mm. MRA of the neck demonstrates patency of the common carotid arteries, internal carotid arteries and vertebral arteries bilaterally. No evidence for vessel dissection, cutoff, hemodynamically significant stenoses by NASCET criteria.    MRI BRAIN WITHOUT CONTRAST    Result Date: 7/23/2022  IMPRESSION: 1. No acute intracranial abnormality, no acute infarct. 2. Mild microangiopathic changes. 3. 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 4. Unremarkable MRA of the neck. COMMENT: Technique:  The brain was scanned in multiple planes with a variety of pulse sequences without contrast. Comparison:  Head CT dated July 22, 2022. Findings: Mild scattered periventricular and subcortical white matter T2 hyperintensities, nonspecific, likely sequela of microangiopathic disease. Sulci, ventricles and basal cisterns are within normal limits for patient's stated age. No mass-effect, intracranial hemorrhage, acute segmental infarct or extra-axial fluid collection is seen. Cerebellar tonsils are normal in position. Expected signal voids are seen in the intracranial vessels at the skull base. The orbits and sella are unremarkable.   The paranasal sinuses demonstrates circumferential mucosal thickening of the right maxillary sinus.  The mastoid air cells are clear. MR angiography head and neck without contrast Technique: An MRA of the [place] was performed utilizing a 3D time-of-flight technique. MRA of the neck was also performed  utilizing 2-D and 3-D time-of-flight technique. No prior studies are available for comparison. 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 Left vertebral artery is dominant. The right vertebral artery ends in PICA. Near fetal origin left PCA. No medium or large size aneurysms are seen in the [place]. Please note that MRA may be insensitive in the detection of aneurysms smaller than 4 mm. MRA of the neck demonstrates patency of the common carotid arteries, internal carotid arteries and vertebral arteries bilaterally. No evidence for vessel dissection, cutoff, hemodynamically significant stenoses by NASCET criteria.    X-RAY CHEST 1 VIEW    Result Date: 7/23/2022  IMPRESSION: Findings suspicious for congestive heart failure. COMMENT: AP radiograph of the chest was obtained.  We have no prior similar studies for comparison.  There is cardiomegaly.  There is vascular congestion/interstitial edema.  There are bilateral effusions.  Findings suggest congestive heart failure.  Sternal sutures are seen.  There is no pneumothorax. We have no prior similar studies for comparison.    Ultrasound venous leg right    Result Date: 7/23/2022  IMPRESSION: No evidence for right -sided deep venous thrombosis in the visualized veins.      Presenting Problem/History of Present Illness  TIA (transient ischemic attack) [G45.9]  Ataxia [R27.0]  Leg edema, right [R60.0]       Exam on Day of Discharge  Patient seen and examined on day of discharge. He is awake and alert, feeling well and eating breakfast. Understanding of plan.     Hospital Course  Samy Elena is a 89 year old male with history of CAD, ischemic cardiomyopathy and biventricular dysfunction with moderate to severe tricuspid insufficiency, afib not on AC due to GIB, on plavix who presented with leg weakness and changes in speech.     He was seen by  neurology, he had imaging done as above, and it was determined that the symptoms wee likely due to postural/orthostatic changes related to symptomatic hypotension. His vericiguat was discontinued as it was felt this was the cause of his symptoms.     He noted urinary frequency and urgency on day of discharge, noting it had been ongoing for ~2-3 days. No dysuria, suprapubic tenderness, hematuria, or changes in urine color. UA on 7/22/22 and 7/25/22 negative for leukocyte esterase and nitrites. UA 7/25 did show rare bacteria, but no pyuria, and unclear If clean catch. Repeat UA ordered, but unable to be obtained as patient did not have need to void for several hours, thus no indication for antibiotics at this time.     PT/OT recommended TCC and patient stable for discharge today.    Problem list on day of discharge:    Atrial fibrillation (CMS/HCC)  He is not on AC due to prior severe GI bleed on AC.    - Rate controlled currently, appears to be A. fib  - Continue metoprolol 100 mg daily    Gait disturbance  - MRI negative for acute infarct. Per neurology- this is likely hypotension in the setting of vericiguat and sildenafil. Will stop vericiguat and monitor BPs. Will need outpatient cardiology f/u.  - Monitor on telemetry  -- PT/ OT recommending TCC (or [facility]). Awaiting bed.    Hypertension  Blood pressure normal and stable, continue spironolactone and Entresto    Ischemic cardiomyopathy  -- Chronic systolic heart failure, without exacerbation  - He denies any new or worsening symptoms and is at baseline, stable R>L BLE edema  - Has severe pulmonary hypertension for which we will continue his home sildenafil and hold his vericiguat as above   -- US negative for RLE DVT    Hyperglycemia  -A1c 6.3, outpatient f/u       Discharge Orders     Medication List      CONTINUE taking these medications    clopidogreL 75 mg tablet  Commonly known as: PLAVIX  Take 75 mg by mouth daily.  Dose: 75 mg     coenzyme Q10 100 mg  capsule  Commonly known as: COQ10  Take 100 mg by mouth daily.  Dose: 100 mg     empagliflozin 10 mg tablet  Commonly known as: JARDIANCE  Take 10 mg by mouth daily.  Dose: 10 mg     ENTRESTO 49-51 mg per tablet  Take 1 tablet by mouth 2 (two) times a day.  Dose: 1 tablet  Generic drug: sacubitriL-valsartan     levothyroxine 100 mcg tablet  Commonly known as: SYNTHROID  Take 100 mcg by mouth daily.  Dose: 100 mcg     metoprolol succinate  mg 24 hr tablet  Commonly known as: TOPROL-XL  Take 100 mg by mouth daily.  Dose: 100 mg     sildenafiL (pulm.hypertension) 20 mg tablet  Commonly known as: REVATIO  Take 20 mg by mouth 3 (three) times a day.  Dose: 20 mg     simvastatin 10 mg tablet  Commonly known as: ZOCOR  Take 10 mg by mouth nightly.  Dose: 10 mg     spironolactone 25 mg tablet  Commonly known as: ALDACTONE  Take 25 mg by mouth daily.  Dose: 25 mg     torsemide 10 mg tablet  Commonly known as: DEMADEX  Take 10-20 mg by mouth daily. 10 mg if weight is 176-181 lbs, 20 mg if weight is >181 lbs  Dose: 10-20 mg        STOP taking these medications    vericiguat 2.5 mg tablet tablet  Commonly known as: VERQUVO                Outpatient Follow-Ups  Encounter Information    This patient does not currently have any appointments scheduled.       Referrals:  No orders of the defined types were placed in this encounter.      Active Issues Requiring Follow-up  Issue: Weakness  What is Needed: Due to hypotension, hold vericiguat  -See your cardiologist in one week  -See PCP in 1-2 weeks     #Urinary Frequency  -May be related to your water pill  -Appears to have improved  -Have your urine rechecked in a few days if symptoms persist      Test Results Pending at Discharge  Unresulted Labs (From admission, onward)             Start     Ordered    07/25/22 1441  Urinalysis with Reflex to Microscopic  Once        Question:  Release to patient  Answer:  Immediate    07/25/22 1440    07/25/22 1441  UA Macroscopic  PROCEDURE  ONCE        Question:  Release to patient  Answer:  Immediate    07/25/22 1440                Discharge Disposition  Disposition:    Destination:        Code Status at Discharge: Full Code  Physician Order for Life-Sustaining Treatment Document Status      No documents found

## 2022-07-25 NOTE — PATIENT CARE CONFERENCE
Care Progression Rounds Note  Date: 7/25/2022  Time: 12:06 PM     Patient Name: Samy Elena     Medical Record Number: [id number]   YOB: 1930  Sex: Male      Room/Bed: 0161    Admitting Diagnosis: TIA (transient ischemic attack) [G45.9]  Ataxia [R27.0]  Leg edema, right [R60.0]   Admit Date/Time: 7/22/2022  7:46 PM    Primary Diagnosis: Gait disturbance  Principal Problem: Gait disturbance    GMLOS: 2.2  Anticipated Discharge Date: 7/25/2022    AM-PAC:  Mobility Score: 20    Discharge Planning:  Current Living Arrangements: home  Concerns to be Addressed: care coordination/care conferences, discharge planning  Anticipated Discharge Disposition: skilled nursing facility    Barriers to Discharge:  Medical issues not resolved    Comments:       Participants:  pharmacy, , physical therapy, physician, nursing, social work/services, occupational therapy

## 2022-07-25 NOTE — PLAN OF CARE
Problem: Adult Inpatient Plan of Care  Goal: Plan of Care Review  Outcome: Progressing  Flowsheets (Taken 7/25/2022 1311)  Progress: improving  Plan of Care Reviewed With: patient  Outcome Summary: pt aaox4 denies any pain, able to verbalize needs, ambulated to bathroom w astx1, call bell within reach  Goal: Patient-Specific Goal (Individualized)  Outcome: Progressing  Goal: Absence of Hospital-Acquired Illness or Injury  Outcome: Progressing  Goal: Optimal Comfort and Wellbeing  Outcome: Progressing  Goal: Readiness for Transition of Care  Outcome: Progressing   Plan of Care Review  Plan of Care Reviewed With: patient  Progress: improving  Outcome Summary: pt aaox4 denies any pain, able to verbalize needs, ambulated to bathroom w astx1, call bell within reach

## 2022-07-25 NOTE — PLAN OF CARE
Addendum-  Accepted to [facility]. Family in agreement with facility this am at screening per IMM.  Message left for son Watson to call back for update re acceptance. Team updated.     Problem: Adult Inpatient Plan of Care  Goal: Readiness for Transition of Care  Intervention: Mutually Develop Transition Plan  Flowsheets (Taken 7/25/2022 1043)  Discharge Coordination/Progress: Awaitng acceptance from three identified short term snfs. [facility], [facility] and [facility].  Assistive Device/Animal Currently Used at Home:  • walker, standard  • cane, straight  Outpatient/Agency/Support Group Needs: (Friends and neighbors check on him. two sons who help and are local.) --  Current Discharge Risk: lives alone  Readmission Within the Last 30 Days: no previous admission in last 30 days  Patient/Family Anticipated Services at Transition: rehabilitation services  Patient/Family Anticipates Transition to: inpatient rehabilitation facility  Transportation Anticipated: health plan transportation  Concerns to be Addressed:  • care coordination/care conferences  • discharge planning    Confirmed facesheet info.    89 year old man here with gait disturbance and seeking short term rehab.    Lives independently two story home with 5 steps to front door and 12 steps to second floor bed and bath.    Has Cane and walker at home.  Doesn't use regularly.  Sons were in process of putting chair lift in home.    Referrals made to three Snfs.  Checked on this am and referrals resent. Awaiting acceptance.    Provided supportive counseling, transition of care needs and discharge planning.    Mikki Reinoso, Татьяна, CANDELARIO, LSW   Ext. [phone]

## 2022-07-26 PROCEDURE — 99306 1ST NF CARE HIGH MDM 50: CPT | Performed by: INTERNAL MEDICINE

## 2022-07-27 ENCOUNTER — TELEPHONE (OUTPATIENT)
Dept: SCHEDULING | Facility: CLINIC | Age: 87
End: 2022-07-27
Payer: MEDICARE

## 2022-07-27 PROCEDURE — 99309 SBSQ NF CARE MODERATE MDM 30: CPT | Performed by: INTERNAL MEDICINE

## 2022-07-27 NOTE — TELEPHONE ENCOUNTER
New Patient Appointment FYI    Name of caller:  [facility] at [phone]    Reason for Visit:  [facility] - 7/25    Insurance:   [facility]    Insurance ID #:   [id number] and [id number]    Recent Procedures:   Unknown    Referred by:  [facility]    Previous Cardiologist name and phone number:   Unknown    Best contact number: [phone]    Additional notes: Patient scheduled for 8/31

## 2022-07-29 PROCEDURE — 99308 SBSQ NF CARE LOW MDM 20: CPT | Performed by: NURSE PRACTITIONER

## 2022-08-01 PROCEDURE — 99309 SBSQ NF CARE MODERATE MDM 30: CPT | Performed by: NURSE PRACTITIONER

## 2022-08-02 ENCOUNTER — ANESTHESIA (INPATIENT)
Dept: OPERATING ROOM | Facility: HOSPITAL | Age: 87
DRG: 717 | End: 2022-08-02
Payer: MEDICARE

## 2022-08-02 ENCOUNTER — HOSPITAL ENCOUNTER (INPATIENT)
Facility: HOSPITAL | Age: 87
LOS: 21 days | Discharge: NURSING, REHAB OR LONG-TERM CARE | DRG: 717 | End: 2022-08-23
Attending: EMERGENCY MEDICINE | Admitting: COLON & RECTAL SURGERY
Payer: MEDICARE

## 2022-08-02 ENCOUNTER — APPOINTMENT (INPATIENT)
Dept: RADIOLOGY | Facility: HOSPITAL | Age: 87
DRG: 717 | End: 2022-08-02
Attending: STUDENT IN AN ORGANIZED HEALTH CARE EDUCATION/TRAINING PROGRAM
Payer: MEDICARE

## 2022-08-02 ENCOUNTER — ANESTHESIA EVENT (INPATIENT)
Dept: OPERATING ROOM | Facility: HOSPITAL | Age: 87
DRG: 717 | End: 2022-08-02
Payer: MEDICARE

## 2022-08-02 ENCOUNTER — OFFICE VISIT (OUTPATIENT)
Dept: SURGERY | Facility: CLINIC | Age: 87
End: 2022-08-02
Payer: MEDICARE

## 2022-08-02 ENCOUNTER — APPOINTMENT (INPATIENT)
Dept: RADIOLOGY | Facility: HOSPITAL | Age: 87
DRG: 717 | End: 2022-08-02
Payer: MEDICARE

## 2022-08-02 VITALS
SYSTOLIC BLOOD PRESSURE: 94 MMHG | DIASTOLIC BLOOD PRESSURE: 64 MMHG | BODY MASS INDEX: 23.46 KG/M2 | WEIGHT: 177 LBS | HEIGHT: 73 IN | HEART RATE: 85 BPM

## 2022-08-02 DIAGNOSIS — R13.10 ABNORMAL SWALLOWING: ICD-10-CM

## 2022-08-02 DIAGNOSIS — R27.0 ATAXIA: ICD-10-CM

## 2022-08-02 DIAGNOSIS — R57.9 SHOCK (CMS/HCC): ICD-10-CM

## 2022-08-02 DIAGNOSIS — N49.3 FOURNIER'S GANGRENE IN MALE: Primary | ICD-10-CM

## 2022-08-02 DIAGNOSIS — L02.91 ABSCESS: ICD-10-CM

## 2022-08-02 DIAGNOSIS — N49.3: ICD-10-CM

## 2022-08-02 DIAGNOSIS — E87.5 HYPERKALEMIA: ICD-10-CM

## 2022-08-02 DIAGNOSIS — R73.9 HYPERGLYCEMIA: ICD-10-CM

## 2022-08-02 DIAGNOSIS — N49.3 FOURNIER'S GANGRENE IN MALE: ICD-10-CM

## 2022-08-02 DIAGNOSIS — I25.5 ISCHEMIC CARDIOMYOPATHY: ICD-10-CM

## 2022-08-02 DIAGNOSIS — M79.89 NECROTIZING SOFT TISSUE INFECTION: Primary | ICD-10-CM

## 2022-08-02 LAB
ABO + RH BLD: NORMAL
ALBUMIN SERPL-MCNC: 1.9 G/DL (ref 3.4–5)
ALP SERPL-CCNC: 98 IU/L (ref 35–126)
ALT SERPL-CCNC: 29 IU/L (ref 16–63)
ANION GAP SERPL CALC-SCNC: 11 MEQ/L (ref 3–15)
ANION GAP SERPL CALC-SCNC: 9 MEQ/L (ref 3–15)
AST SERPL-CCNC: 71 IU/L (ref 15–41)
BASE EXCESS BLDA CALC-SCNC: -5.6 MEQ/L
BASE EXCESS BLDA CALC-SCNC: -7.1 MEQ/L
BASE EXCESS BLDA CALC-SCNC: -8 MEQ/L
BASOPHILS # BLD: 0 K/UL (ref 0.01–0.1)
BASOPHILS NFR BLD: 0 %
BILIRUB SERPL-MCNC: 2.3 MG/DL (ref 0.3–1.2)
BLD GP AB SCN SERPL QL: NEGATIVE
BUN SERPL-MCNC: 24 MG/DL (ref 8–20)
BUN SERPL-MCNC: 26 MG/DL (ref 8–20)
CA-I BLD-SCNC: 1.14 MMOL/L (ref 1.15–1.27)
CA-I BLD-SCNC: 1.19 MMOL/L (ref 1.15–1.27)
CA-I BLD-SCNC: 1.42 MMOL/L (ref 1.15–1.27)
CALCIUM SERPL-MCNC: 8.2 MG/DL (ref 8.9–10.3)
CALCIUM SERPL-MCNC: 8.5 MG/DL (ref 8.9–10.3)
CHLORIDE BLDA-SCNC: 105 MEQ/L (ref 98–109)
CHLORIDE BLDA-SCNC: 106 MEQ/L (ref 98–109)
CHLORIDE BLDA-SCNC: 107 MEQ/L (ref 98–109)
CHLORIDE SERPL-SCNC: 106 MEQ/L (ref 98–109)
CHLORIDE SERPL-SCNC: 108 MEQ/L (ref 98–109)
CK SERPL-CCNC: 78 U/L (ref 16–300)
CO2 BLDA-SCNC: 16.7 MEQ/L (ref 22–32)
CO2 BLDA-SCNC: 20.6 MEQ/L (ref 22–32)
CO2 BLDA-SCNC: 20.7 MEQ/L (ref 22–32)
CO2 SERPL-SCNC: 17 MEQ/L (ref 22–32)
CO2 SERPL-SCNC: 19 MEQ/L (ref 22–32)
COHGB MFR BLD: 2 %
COHGB MFR BLD: 2 %
COHGB MFR BLD: 2.8 %
CREAT SERPL-MCNC: 1 MG/DL (ref 0.8–1.3)
CREAT SERPL-MCNC: 1.2 MG/DL (ref 0.8–1.3)
CRP SERPL-MCNC: 140.23 MG/L
D AG BLD QL: POSITIVE
DIFFERENTIAL METHOD BLD: ABNORMAL
EOSINOPHIL # BLD: 0 K/UL (ref 0.04–0.54)
EOSINOPHIL NFR BLD: 0 %
ERYTHROCYTE [DISTWIDTH] IN BLOOD BY AUTOMATED COUNT: 14.3 % (ref 11.6–14.4)
ERYTHROCYTE [DISTWIDTH] IN BLOOD BY AUTOMATED COUNT: 14.5 % (ref 11.6–14.4)
ERYTHROCYTE [SEDIMENTATION RATE] IN BLOOD BY WESTERGREN METHOD: 83 MM/HR
FIO2 ON VENT: ABNORMAL %
GFR SERPL CREATININE-BSD FRML MDRD: 56.9 ML/MIN/1.73M*2
GFR SERPL CREATININE-BSD FRML MDRD: >60 ML/MIN/1.73M*2
GLUCOSE BLD-MCNC: 159 MG/DL (ref 70–99)
GLUCOSE BLDA-MCNC: 163 MG/DL (ref 70–99)
GLUCOSE BLDA-MCNC: 179 MG/DL (ref 70–99)
GLUCOSE BLDA-MCNC: 183 MG/DL (ref 70–99)
GLUCOSE SERPL-MCNC: 173 MG/DL (ref 70–99)
GLUCOSE SERPL-MCNC: 174 MG/DL (ref 70–99)
HCO3 BLDA-SCNC: 18.7 MEQ/L (ref 21–28)
HCO3 BLDA-SCNC: 19.4 MEQ/L (ref 21–28)
HCO3 BLDA-SCNC: 20.5 MEQ/L (ref 21–28)
HCT VFR BLDCO AUTO: 35.1 % (ref 40.1–51)
HCT VFR BLDCO AUTO: 41.4 % (ref 40.1–51)
HGB BLD-MCNC: 11.8 G/DL (ref 13.7–17.5)
HGB BLD-MCNC: 13.8 G/DL (ref 13.7–17.5)
HGB BLDA OXIMETRY-MCNC: 11.9 G/DL (ref 14–17.5)
HGB BLDA OXIMETRY-MCNC: 12.5 G/DL (ref 14–17.5)
HGB BLDA OXIMETRY-MCNC: 13 G/DL (ref 14–17.5)
INHALED O2 CONCENTRATION: ABNORMAL %
INR PPP: 1.4
INR PPP: 1.7
LABORATORY COMMENT REPORT: NORMAL
LACTATE BLDA-SCNC: 1.6 MMOL/L (ref 0.4–1.6)
LACTATE BLDA-SCNC: 1.7 MMOL/L (ref 0.4–1.6)
LACTATE BLDA-SCNC: 2 MMOL/L (ref 0.4–1.6)
LACTATE SERPL-SCNC: 1.5 MMOL/L (ref 0.4–2)
LACTATE SERPL-SCNC: 3.7 MMOL/L (ref 0.4–2)
LYMPHOCYTES # BLD: 0.75 K/UL (ref 1.2–3.5)
LYMPHOCYTES NFR BLD: 4 %
MACROCYTES BLD QL SMEAR: ABNORMAL
MAGNESIUM SERPL-MCNC: 2 MG/DL (ref 1.8–2.5)
MAGNESIUM SERPL-MCNC: 2 MG/DL (ref 1.8–2.5)
MCH RBC QN AUTO: 34.1 PG (ref 28–33.2)
MCH RBC QN AUTO: 34.5 PG (ref 28–33.2)
MCHC RBC AUTO-ENTMCNC: 33.3 G/DL (ref 32.2–36.5)
MCHC RBC AUTO-ENTMCNC: 33.6 G/DL (ref 32.2–36.5)
MCV RBC AUTO: 102.2 FL (ref 83–98)
MCV RBC AUTO: 102.6 FL (ref 83–98)
METHGB BLD-SCNC: 0 % (ref 0.4–1.5)
METHGB BLD-SCNC: 0.3 % (ref 0.4–1.5)
METHGB BLD-SCNC: 0.5 % (ref 0.4–1.5)
MONOCYTES # BLD: 1.12 K/UL (ref 0.3–1)
MONOCYTES NFR BLD: 6 %
NEUTS BAND # BLD: 0.56 K/UL (ref 0–0.53)
NEUTS BAND # BLD: 16.27 K/UL (ref 1.7–7)
NEUTS BAND NFR BLD: 3 %
NEUTS SEG NFR BLD: 87 %
PCO2 BLDA: 22 MM HG (ref 35–48)
PCO2 BLDA: 41 MM HG (ref 35–48)
PCO2 BLDA: 45 MM HG (ref 35–48)
PDW BLD AUTO: 10.2 FL (ref 9.4–12.4)
PDW BLD AUTO: 9.8 FL (ref 9.4–12.4)
PH BLDA: 7.24 [PH] (ref 7.35–7.45)
PH BLDA: 7.28 [PH] (ref 7.35–7.45)
PH BLDA: 7.47 [PH] (ref 7.35–7.45)
PHOSPHATE SERPL-MCNC: 3.9 MG/DL (ref 2.4–4.7)
PLAT MORPH BLD: NORMAL
PLATELET # BLD AUTO: 144 K/UL (ref 150–350)
PLATELET # BLD AUTO: 173 K/UL (ref 150–350)
PLATELET # BLD EST: ABNORMAL 10*3/UL
PO2 BLDA: 116 MM HG (ref 83–100)
PO2 BLDA: 118 MM HG (ref 83–100)
PO2 BLDA: 68 MM HG (ref 83–100)
POCT TEST: ABNORMAL
POLYCHROMASIA BLD QL SMEAR: ABNORMAL
POTASSIUM BLDA-SCNC: 3.8 MEQ/L (ref 3.4–4.5)
POTASSIUM BLDA-SCNC: 3.9 MEQ/L (ref 3.4–4.5)
POTASSIUM BLDA-SCNC: 4.8 MEQ/L (ref 3.4–4.5)
POTASSIUM SERPL-SCNC: 3.9 MEQ/L (ref 3.6–5.1)
POTASSIUM SERPL-SCNC: 3.9 MEQ/L (ref 3.6–5.1)
PROT SERPL-MCNC: 4.7 G/DL (ref 6–8.2)
PROTHROMBIN TIME: 17.2 SEC (ref 12.2–14.5)
PROTHROMBIN TIME: 19.5 SEC (ref 12.2–14.5)
RBC # BLD AUTO: 3.42 M/UL (ref 4.5–5.8)
RBC # BLD AUTO: 4.05 M/UL (ref 4.5–5.8)
SAO2 % BLDA: 93 % (ref 93–98)
SAO2 % BLDA: 97 % (ref 93–98)
SAO2 % BLDA: 97 % (ref 93–98)
SARS-COV-2 RNA RESP QL NAA+PROBE: NEGATIVE
SODIUM BLDA-SCNC: 133 MEQ/L (ref 136–145)
SODIUM BLDA-SCNC: 133 MEQ/L (ref 136–145)
SODIUM BLDA-SCNC: 134 MEQ/L (ref 136–145)
SODIUM SERPL-SCNC: 134 MEQ/L (ref 136–144)
SODIUM SERPL-SCNC: 136 MEQ/L (ref 136–144)
SPECIMEN EXP DATE BLD: NORMAL
WBC # BLD AUTO: 17.45 K/UL (ref 3.8–10.5)
WBC # BLD AUTO: 18.7 K/UL (ref 3.8–10.5)

## 2022-08-02 PROCEDURE — 85027 COMPLETE CBC AUTOMATED: CPT

## 2022-08-02 PROCEDURE — 83735 ASSAY OF MAGNESIUM: CPT

## 2022-08-02 PROCEDURE — 93005 ELECTROCARDIOGRAM TRACING: CPT | Performed by: SURGERY

## 2022-08-02 PROCEDURE — 25800000 HC PHARMACY IV SOLUTIONS: Performed by: ANESTHESIOLOGY

## 2022-08-02 PROCEDURE — 82947 ASSAY GLUCOSE BLOOD QUANT: CPT | Performed by: EMERGENCY MEDICINE

## 2022-08-02 PROCEDURE — U0002 COVID-19 LAB TEST NON-CDC: HCPCS | Performed by: EMERGENCY MEDICINE

## 2022-08-02 PROCEDURE — 86140 C-REACTIVE PROTEIN: CPT | Performed by: EMERGENCY MEDICINE

## 2022-08-02 PROCEDURE — 63600000 HC DRUGS/DETAIL CODE: Performed by: ANESTHESIOLOGY

## 2022-08-02 PROCEDURE — 85018 HEMOGLOBIN: CPT | Performed by: COLON & RECTAL SURGERY

## 2022-08-02 PROCEDURE — 02HV33Z INSERTION OF INFUSION DEVICE INTO SUPERIOR VENA CAVA, PERCUTANEOUS APPROACH: ICD-10-PCS | Performed by: ANESTHESIOLOGY

## 2022-08-02 PROCEDURE — 0BH17EZ INSERTION OF ENDOTRACHEAL AIRWAY INTO TRACHEA, VIA NATURAL OR ARTIFICIAL OPENING: ICD-10-PCS | Performed by: COLON & RECTAL SURGERY

## 2022-08-02 PROCEDURE — 03HY32Z INSERTION OF MONITORING DEVICE INTO UPPER ARTERY, PERCUTANEOUS APPROACH: ICD-10-PCS | Performed by: ANESTHESIOLOGY

## 2022-08-02 PROCEDURE — 85610 PROTHROMBIN TIME: CPT

## 2022-08-02 PROCEDURE — 87205 SMEAR GRAM STAIN: CPT | Performed by: COLON & RECTAL SURGERY

## 2022-08-02 PROCEDURE — 25000000 HC PHARMACY GENERAL: Performed by: EMERGENCY MEDICINE

## 2022-08-02 PROCEDURE — 74018 RADEX ABDOMEN 1 VIEW: CPT

## 2022-08-02 PROCEDURE — 80053 COMPREHEN METABOLIC PANEL: CPT

## 2022-08-02 PROCEDURE — 63600000 HC DRUGS/DETAIL CODE: Performed by: STUDENT IN AN ORGANIZED HEALTH CARE EDUCATION/TRAINING PROGRAM

## 2022-08-02 PROCEDURE — 83605 ASSAY OF LACTIC ACID: CPT

## 2022-08-02 PROCEDURE — 11004 DBRDMT SKIN XTRNL GENT&PER: CPT | Performed by: COLON & RECTAL SURGERY

## 2022-08-02 PROCEDURE — 25800000 HC PHARMACY IV SOLUTIONS: Performed by: NURSE ANESTHETIST, CERTIFIED REGISTERED

## 2022-08-02 PROCEDURE — 25000000 HC PHARMACY GENERAL: Performed by: NURSE ANESTHETIST, CERTIFIED REGISTERED

## 2022-08-02 PROCEDURE — 87641 MR-STAPH DNA AMP PROBE: CPT | Performed by: COLON & RECTAL SURGERY

## 2022-08-02 PROCEDURE — 87040 BLOOD CULTURE FOR BACTERIA: CPT | Performed by: EMERGENCY MEDICINE

## 2022-08-02 PROCEDURE — 25000000 HC PHARMACY GENERAL

## 2022-08-02 PROCEDURE — 71045 X-RAY EXAM CHEST 1 VIEW: CPT

## 2022-08-02 PROCEDURE — 83735 ASSAY OF MAGNESIUM: CPT | Performed by: EMERGENCY MEDICINE

## 2022-08-02 PROCEDURE — 63600000 HC DRUGS/DETAIL CODE: Performed by: EMERGENCY MEDICINE

## 2022-08-02 PROCEDURE — 88304 TISSUE EXAM BY PATHOLOGIST: CPT | Performed by: COLON & RECTAL SURGERY

## 2022-08-02 PROCEDURE — 82375 ASSAY CARBOXYHB QUANT: CPT | Performed by: COLON & RECTAL SURGERY

## 2022-08-02 PROCEDURE — 36415 COLL VENOUS BLD VENIPUNCTURE: CPT | Performed by: EMERGENCY MEDICINE

## 2022-08-02 PROCEDURE — 63600000 HC DRUGS/DETAIL CODE

## 2022-08-02 PROCEDURE — 25000000 HC PHARMACY GENERAL: Performed by: ANESTHESIOLOGY

## 2022-08-02 PROCEDURE — 94002 VENT MGMT INPAT INIT DAY: CPT

## 2022-08-02 PROCEDURE — 93005 ELECTROCARDIOGRAM TRACING: CPT | Performed by: COLON & RECTAL SURGERY

## 2022-08-02 PROCEDURE — 86901 BLOOD TYPING SEROLOGIC RH(D): CPT

## 2022-08-02 PROCEDURE — 200200 PR NO CHARGE: Performed by: COLON & RECTAL SURGERY

## 2022-08-02 PROCEDURE — 86920 COMPATIBILITY TEST SPIN: CPT

## 2022-08-02 PROCEDURE — 85025 COMPLETE CBC W/AUTO DIFF WBC: CPT | Performed by: EMERGENCY MEDICINE

## 2022-08-02 PROCEDURE — 99285 EMERGENCY DEPT VISIT HI MDM: CPT | Mod: 25

## 2022-08-02 PROCEDURE — 3E033XZ INTRODUCTION OF VASOPRESSOR INTO PERIPHERAL VEIN, PERCUTANEOUS APPROACH: ICD-10-PCS | Performed by: COLON & RECTAL SURGERY

## 2022-08-02 PROCEDURE — 83605 ASSAY OF LACTIC ACID: CPT | Performed by: EMERGENCY MEDICINE

## 2022-08-02 PROCEDURE — 94003 VENT MGMT INPAT SUBQ DAY: CPT

## 2022-08-02 PROCEDURE — 36000012 HC OR LEVEL 2 EA ADDL MIN: Performed by: COLON & RECTAL SURGERY

## 2022-08-02 PROCEDURE — 85652 RBC SED RATE AUTOMATED: CPT | Performed by: EMERGENCY MEDICINE

## 2022-08-02 PROCEDURE — 25000000 HC PHARMACY GENERAL: Performed by: STUDENT IN AN ORGANIZED HEALTH CARE EDUCATION/TRAINING PROGRAM

## 2022-08-02 PROCEDURE — 87077 CULTURE AEROBIC IDENTIFY: CPT | Performed by: COLON & RECTAL SURGERY

## 2022-08-02 PROCEDURE — 85610 PROTHROMBIN TIME: CPT | Performed by: EMERGENCY MEDICINE

## 2022-08-02 PROCEDURE — 20000000 HC ROOM AND CARE ICU

## 2022-08-02 PROCEDURE — 99309 SBSQ NF CARE MODERATE MDM 30: CPT | Performed by: INTERNAL MEDICINE

## 2022-08-02 PROCEDURE — 84100 ASSAY OF PHOSPHORUS: CPT

## 2022-08-02 PROCEDURE — 25800000 HC PHARMACY IV SOLUTIONS: Performed by: EMERGENCY MEDICINE

## 2022-08-02 PROCEDURE — 36000002 HC OR LEVEL 2 INITIAL 30MIN: Performed by: COLON & RECTAL SURGERY

## 2022-08-02 PROCEDURE — 5A1955Z RESPIRATORY VENTILATION, GREATER THAN 96 CONSECUTIVE HOURS: ICD-10-PCS | Performed by: COLON & RECTAL SURGERY

## 2022-08-02 PROCEDURE — 82550 ASSAY OF CK (CPK): CPT | Performed by: EMERGENCY MEDICINE

## 2022-08-02 PROCEDURE — 27200000 HC STERILE SUPPLY: Performed by: COLON & RECTAL SURGERY

## 2022-08-02 PROCEDURE — 99205 OFFICE O/P NEW HI 60 MIN: CPT | Performed by: COLON & RECTAL SURGERY

## 2022-08-02 PROCEDURE — 25800000 HC PHARMACY IV SOLUTIONS: Performed by: STUDENT IN AN ORGANIZED HEALTH CARE EDUCATION/TRAINING PROGRAM

## 2022-08-02 PROCEDURE — 37000001 HC ANESTHESIA GENERAL: Performed by: COLON & RECTAL SURGERY

## 2022-08-02 PROCEDURE — 0KBM0ZZ EXCISION OF PERINEUM MUSCLE, OPEN APPROACH: ICD-10-PCS | Performed by: COLON & RECTAL SURGERY

## 2022-08-02 RX ORDER — CALCIUM CHLORIDE INJECTION 100 MG/ML
INJECTION, SOLUTION INTRAVENOUS AS NEEDED
Status: DISCONTINUED | OUTPATIENT
Start: 2022-08-02 | End: 2022-08-02 | Stop reason: SURG

## 2022-08-02 RX ORDER — MULTIVITAMIN
TABLET ORAL DAILY
COMMUNITY
End: 2022-08-23 | Stop reason: HOSPADM

## 2022-08-02 RX ORDER — PANTOPRAZOLE SODIUM 40 MG/10ML
40 INJECTION, POWDER, LYOPHILIZED, FOR SOLUTION INTRAVENOUS EVERY 24 HOURS
Status: COMPLETED | OUTPATIENT
Start: 2022-08-03 | End: 2022-08-15

## 2022-08-02 RX ORDER — ROCURONIUM BROMIDE 10 MG/ML
INJECTION, SOLUTION INTRAVENOUS AS NEEDED
Status: DISCONTINUED | OUTPATIENT
Start: 2022-08-02 | End: 2022-08-02 | Stop reason: SURG

## 2022-08-02 RX ORDER — NOREPINEPHRINE BITARTRATE 0.02 MG/ML
.5-9 INJECTION, SOLUTION INTRAVENOUS
Status: DISCONTINUED | OUTPATIENT
Start: 2022-08-02 | End: 2022-08-10

## 2022-08-02 RX ORDER — CLINDAMYCIN PHOSPHATE 900 MG/50ML
900 INJECTION, SOLUTION INTRAVENOUS ONCE
Status: COMPLETED | OUTPATIENT
Start: 2022-08-02 | End: 2022-08-02

## 2022-08-02 RX ORDER — DEXTROSE 50 % IN WATER (D50W) INTRAVENOUS SYRINGE
25 AS NEEDED
Status: DISCONTINUED | OUTPATIENT
Start: 2022-08-02 | End: 2022-08-16

## 2022-08-02 RX ORDER — DEXAMETHASONE SODIUM PHOSPHATE 4 MG/ML
INJECTION, SOLUTION INTRA-ARTICULAR; INTRALESIONAL; INTRAMUSCULAR; INTRAVENOUS; SOFT TISSUE AS NEEDED
Status: DISCONTINUED | OUTPATIENT
Start: 2022-08-02 | End: 2022-08-02 | Stop reason: SURG

## 2022-08-02 RX ORDER — DEXTROSE 40 %
15-30 GEL (GRAM) ORAL AS NEEDED
Status: DISCONTINUED | OUTPATIENT
Start: 2022-08-02 | End: 2022-08-16

## 2022-08-02 RX ORDER — VANCOMYCIN HYDROCHLORIDE
15 ONCE
Status: COMPLETED | OUTPATIENT
Start: 2022-08-02 | End: 2022-08-02

## 2022-08-02 RX ORDER — SODIUM CHLORIDE, SODIUM LACTATE, POTASSIUM CHLORIDE, CALCIUM CHLORIDE 600; 310; 30; 20 MG/100ML; MG/100ML; MG/100ML; MG/100ML
INJECTION, SOLUTION INTRAVENOUS CONTINUOUS
Status: DISCONTINUED | OUTPATIENT
Start: 2022-08-02 | End: 2022-08-03

## 2022-08-02 RX ORDER — IBUPROFEN 200 MG
16-32 TABLET ORAL AS NEEDED
Status: DISCONTINUED | OUTPATIENT
Start: 2022-08-02 | End: 2022-08-16

## 2022-08-02 RX ORDER — IBUPROFEN 200 MG
16-32 TABLET ORAL AS NEEDED
Status: DISCONTINUED | OUTPATIENT
Start: 2022-08-02 | End: 2022-08-10 | Stop reason: SDUPTHER

## 2022-08-02 RX ORDER — FENTANYL CITRATE 50 UG/ML
INJECTION, SOLUTION INTRAMUSCULAR; INTRAVENOUS AS NEEDED
Status: DISCONTINUED | OUTPATIENT
Start: 2022-08-02 | End: 2022-08-02 | Stop reason: SURG

## 2022-08-02 RX ORDER — METOPROLOL TARTRATE 1 MG/ML
5 INJECTION, SOLUTION INTRAVENOUS
Status: DISCONTINUED | OUTPATIENT
Start: 2022-08-02 | End: 2022-08-03

## 2022-08-02 RX ORDER — POTASSIUM CHLORIDE 14.9 MG/ML
20 INJECTION INTRAVENOUS ONCE
Status: COMPLETED | OUTPATIENT
Start: 2022-08-02 | End: 2022-08-03

## 2022-08-02 RX ORDER — DEXTROSE 40 %
15-30 GEL (GRAM) ORAL AS NEEDED
Status: DISCONTINUED | OUTPATIENT
Start: 2022-08-02 | End: 2022-08-10 | Stop reason: SDUPTHER

## 2022-08-02 RX ORDER — SODIUM BICARBONATE 1 MEQ/ML
SYRINGE (ML) INTRAVENOUS AS NEEDED
Status: DISCONTINUED | OUTPATIENT
Start: 2022-08-02 | End: 2022-08-02 | Stop reason: SURG

## 2022-08-02 RX ORDER — FENTANYL CITRATE/PF 100MCG/2ML
0-200 SYRINGE (ML) INTRAVENOUS
Status: DISCONTINUED | OUTPATIENT
Start: 2022-08-02 | End: 2022-08-10

## 2022-08-02 RX ORDER — LIDOCAINE HYDROCHLORIDE 10 MG/ML
INJECTION, SOLUTION INFILTRATION; PERINEURAL AS NEEDED
Status: DISCONTINUED | OUTPATIENT
Start: 2022-08-02 | End: 2022-08-02 | Stop reason: SURG

## 2022-08-02 RX ORDER — BISACODYL 10 MG/1
SUPPOSITORY RECTAL
COMMUNITY
Start: 2022-07-25 | End: 2022-08-23 | Stop reason: HOSPADM

## 2022-08-02 RX ORDER — PROPOFOL 10 MG/ML
INJECTION, EMULSION INTRAVENOUS AS NEEDED
Status: DISCONTINUED | OUTPATIENT
Start: 2022-08-02 | End: 2022-08-02 | Stop reason: SURG

## 2022-08-02 RX ORDER — SODIUM CHLORIDE 9 MG/ML
INJECTION, SOLUTION INTRAVENOUS CONTINUOUS PRN
Status: DISCONTINUED | OUTPATIENT
Start: 2022-08-02 | End: 2022-08-02 | Stop reason: SURG

## 2022-08-02 RX ORDER — DEXMEDETOMIDINE HYDROCHLORIDE 4 UG/ML
.2-1.5 INJECTION, SOLUTION INTRAVENOUS
Status: DISCONTINUED | OUTPATIENT
Start: 2022-08-02 | End: 2022-08-10

## 2022-08-02 RX ORDER — DEXTROSE 50 % IN WATER (D50W) INTRAVENOUS SYRINGE
25 AS NEEDED
Status: DISCONTINUED | OUTPATIENT
Start: 2022-08-02 | End: 2022-08-10 | Stop reason: SDUPTHER

## 2022-08-02 RX ORDER — NOREPINEPHRINE BITARTRATE 0.02 MG/ML
INJECTION, SOLUTION INTRAVENOUS CONTINUOUS PRN
Status: DISCONTINUED | OUTPATIENT
Start: 2022-08-02 | End: 2022-08-02 | Stop reason: SURG

## 2022-08-02 RX ORDER — CHLORHEXIDINE GLUCONATE ORAL RINSE 1.2 MG/ML
15 SOLUTION DENTAL
Status: DISCONTINUED | OUTPATIENT
Start: 2022-08-03 | End: 2022-08-12

## 2022-08-02 RX ORDER — SODIUM CHLORIDE, SODIUM GLUCONATE, SODIUM ACETATE, POTASSIUM CHLORIDE AND MAGNESIUM CHLORIDE 30; 37; 368; 526; 502 MG/100ML; MG/100ML; MG/100ML; MG/100ML; MG/100ML
INJECTION, SOLUTION INTRAVENOUS CONTINUOUS PRN
Status: DISCONTINUED | OUTPATIENT
Start: 2022-08-02 | End: 2022-08-02 | Stop reason: SURG

## 2022-08-02 RX ORDER — ACETAMINOPHEN 325 MG/1
TABLET ORAL
COMMUNITY
Start: 2022-07-25 | End: 2022-08-23 | Stop reason: HOSPADM

## 2022-08-02 RX ORDER — INSULIN ASPART 100 [IU]/ML
3-5 INJECTION, SOLUTION INTRAVENOUS; SUBCUTANEOUS
Status: DISCONTINUED | OUTPATIENT
Start: 2022-08-03 | End: 2022-08-03

## 2022-08-02 RX ORDER — ETOMIDATE 2 MG/ML
INJECTION INTRAVENOUS AS NEEDED
Status: DISCONTINUED | OUTPATIENT
Start: 2022-08-02 | End: 2022-08-02 | Stop reason: SURG

## 2022-08-02 RX ADMIN — VASOPRESSIN 0.04 UNITS/MIN: 20 INJECTION PARENTERAL at 18:45

## 2022-08-02 RX ADMIN — Medication 25 MCG/HR: at 21:40

## 2022-08-02 RX ADMIN — SODIUM CHLORIDE, POTASSIUM CHLORIDE, SODIUM LACTATE AND CALCIUM CHLORIDE 500 ML: 600; 310; 30; 20 INJECTION, SOLUTION INTRAVENOUS at 21:33

## 2022-08-02 RX ADMIN — VANCOMYCIN HYDROCHLORIDE 1250 MG: 500 INJECTION, POWDER, LYOPHILIZED, FOR SOLUTION INTRAVENOUS at 18:16

## 2022-08-02 RX ADMIN — SODIUM BICARBONATE 50 MEQ: 84 INJECTION, SOLUTION INTRAVENOUS at 20:19

## 2022-08-02 RX ADMIN — SODIUM CHLORIDE: 9 INJECTION, SOLUTION INTRAVENOUS at 18:27

## 2022-08-02 RX ADMIN — ETOMIDATE INJECTION 10 MG: 2 SOLUTION INTRAVENOUS at 18:52

## 2022-08-02 RX ADMIN — PIPERACILLIN SODIUM AND TAZOBACTAM SODIUM 3.38 G: 3; .375 INJECTION, POWDER, LYOPHILIZED, FOR SOLUTION INTRAVENOUS at 17:11

## 2022-08-02 RX ADMIN — CALCIUM CHLORIDE 0.05 G: 100 INJECTION, SOLUTION INTRAVENOUS at 20:27

## 2022-08-02 RX ADMIN — NOREPINEPHRINE BITARTRATE 16 MCG: at 20:28

## 2022-08-02 RX ADMIN — DEXAMETHASONE SODIUM PHOSPHATE 4 MG: 4 INJECTION, SOLUTION INTRAMUSCULAR; INTRAVENOUS at 19:08

## 2022-08-02 RX ADMIN — SODIUM CHLORIDE, POTASSIUM CHLORIDE, SODIUM LACTATE AND CALCIUM CHLORIDE: 600; 310; 30; 20 INJECTION, SOLUTION INTRAVENOUS at 21:34

## 2022-08-02 RX ADMIN — SODIUM CHLORIDE, SODIUM GLUCONATE, SODIUM ACETATE, POTASSIUM CHLORIDE AND MAGNESIUM CHLORIDE: 526; 502; 368; 37; 30 INJECTION, SOLUTION INTRAVENOUS at 18:46

## 2022-08-02 RX ADMIN — POTASSIUM CHLORIDE 20 MEQ: 14.9 INJECTION, SOLUTION INTRAVENOUS at 22:16

## 2022-08-02 RX ADMIN — ROCURONIUM BROMIDE 100 MG: 10 INJECTION, SOLUTION INTRAVENOUS at 18:52

## 2022-08-02 RX ADMIN — NOREPINEPHRINE BITARTRATE 4 MCG/MIN: at 18:45

## 2022-08-02 RX ADMIN — LIDOCAINE HYDROCHLORIDE 8 ML: 10 INJECTION, SOLUTION INFILTRATION; PERINEURAL at 18:52

## 2022-08-02 RX ADMIN — PROPOFOL 20 MG: 10 INJECTION, EMULSION INTRAVENOUS at 18:52

## 2022-08-02 RX ADMIN — SODIUM CHLORIDE: 9 INJECTION, SOLUTION INTRAVENOUS at 18:42

## 2022-08-02 RX ADMIN — CLINDAMYCIN PHOSPHATE 900 MG: 900 INJECTION, SOLUTION INTRAVENOUS at 17:08

## 2022-08-02 RX ADMIN — CALCIUM CHLORIDE 0.05 G: 100 INJECTION, SOLUTION INTRAVENOUS at 19:50

## 2022-08-02 RX ADMIN — FENTANYL CITRATE 100 MCG: 50 INJECTION, SOLUTION INTRAMUSCULAR; INTRAVENOUS at 19:30

## 2022-08-02 RX ADMIN — SODIUM CHLORIDE, POTASSIUM CHLORIDE, SODIUM LACTATE AND CALCIUM CHLORIDE 500 ML: 600; 310; 30; 20 INJECTION, SOLUTION INTRAVENOUS at 23:33

## 2022-08-02 RX ADMIN — DOBUTAMINE 5 MCG/KG/MIN: 12.5 INJECTION, SOLUTION, CONCENTRATE INTRAVENOUS at 18:45

## 2022-08-02 RX ADMIN — METOPROLOL TARTRATE 5 MG: 1 INJECTION, SOLUTION INTRAVENOUS at 22:19

## 2022-08-02 RX ADMIN — DEXMEDETOMIDINE HYDROCHLORIDE 0.7 MCG/KG/HR: 100 INJECTION, SOLUTION INTRAVENOUS at 20:37

## 2022-08-02 ASSESSMENT — ENCOUNTER SYMPTOMS
DIARRHEA: 0
ABDOMINAL PAIN: 0
SHORTNESS OF BREATH: 0
NAUSEA: 0
DYSRHYTHMIAS: 1
RECTAL PAIN: 1
ABDOMINAL DISTENTION: 0

## 2022-08-02 NOTE — OR SURGEON
Pre-Procedure patient identification:  I am the primary operating surgeon/proceduralist and I have identified the patient on 08/02/22 at 5:52 PM Mat Bryant MD  Phone Number: [phone]

## 2022-08-02 NOTE — PERIOPERATIVE NURSING NOTE
rec'd pt from ED via stretcher with RN. Monitor on. Report given. BP low Pt AAox3. Son at bedside. Spoke with Dr Bryant. Jamil arm IVs with NSS infusing wide open

## 2022-08-02 NOTE — PROGRESS NOTES
"Colon and Rectal Surgery Consult    Subjective     Samy Elena is a 89 y.o. male who is referred for consultation from the [facility] care [place] for a \"pilonidal cyst\".  The patient developed pain in the buttock area over the past 3 to 4 days.  He is having difficulty with sitting.  Is also having significant edema around the base of the penis and suprapubic area with a lot of discomfort.    He is trying to recover from recent hospitalization for change in mental status secondary to hypotension/hypoperfusion.  He did not appear to have a definable CVA.    The patient has extensive medical problems including congestive heart failure with low ejection fraction, history of MI, and chronic atrial fibrillation, not on anticoagulation.    Medical History:   Past Medical History:   Diagnosis Date   • Atrial fibrillation (CMS/HCC)    • Disease of thyroid gland    • Hypertension    • Myocardial infarction (CMS/HCC)        Surgical History:   Past Surgical History:   Procedure Laterality Date   • BYPASS GRAFT         Social History:   Social History     Tobacco Use   • Smoking status: Never Smoker   • Smokeless tobacco: Never Used       Family History: No family history on file.    Allergies: Patient has no known allergies.    Current Medications:  •  acetaminophen  •  bisacodyL  •  clopidogreL  •  empagliflozin  •  levothyroxine  •  metoprolol succinate XL  •  ENTRESTO  •  simvastatin  •  spironolactone  •  coenzyme Q10  •  multivitamin  •  sildenafiL (pulm.hypertension)  •  torsemide    Review of Systems  Review of Systems   Respiratory: Negative for shortness of breath.    Cardiovascular: Negative for chest pain.   Gastrointestinal: Positive for rectal pain. Negative for abdominal distention, abdominal pain, diarrhea and nausea.   All other systems reviewed and are negative.      Objective     Physicial Exam  Visit Vitals  BP 94/64 (Patient Position: Sitting)   Pulse 85   Ht 1.854 m (6' 1\")   Wt 80.3 kg (177 lb) "   BMI 23.35 kg/m²       Physical Exam  Vitals reviewed.   Constitutional:       Appearance: Normal appearance. He is well-developed.      Comments: Extremely frail elderly gentleman who can only stand and pivot with assistance.   HENT:      Head: Normocephalic and atraumatic.      Right Ear: Decreased hearing noted.      Left Ear: Decreased hearing noted.   Eyes:      Pupils: Pupils are equal, round, and reactive to light.   Cardiovascular:      Rate and Rhythm: Normal rate. Rhythm irregularly irregular.      Heart sounds: Normal heart sounds. No murmur heard.  Pulmonary:      Effort: Pulmonary effort is normal. No respiratory distress.      Breath sounds: Examination of the right-lower field reveals rales. Rales present. No wheezing.   Abdominal:      General: There is no distension.      Palpations: Abdomen is soft. There is no mass.      Tenderness: There is no abdominal tenderness.      Hernia: No hernia is present.   Genitourinary:     Comments: A lot of edema at the suprapubic area and base of the penis with significant edema of the penis itself.  He has some induration and pain in this area.  There is induration with tenderness in the left anterior perianal area with a small skin incision with some bloody purulent discharge.  I probed this and was able to find tracking into the buttock and anterior towards the perineum.  The area was infiltrated with buffered lidocaine with epinephrine and then cavity was incised to find a large amount of bloody purulent fluid with a lot of necrotic debris and very poor odor.  There is tracking of the wound posteriorly and anteriorly with a lot of necrotic tissue and microthrombi.  Musculoskeletal:         General: No tenderness. Normal range of motion.      Cervical back: Normal range of motion and neck supple.   Lymphadenopathy:      Cervical: No cervical adenopathy.   Skin:     General: Skin is warm and dry.      Capillary Refill: Capillary refill takes less than 2  seconds.      Findings: No rash.   Neurological:      Mental Status: He is alert and oriented to person, place, and time.      Cranial Nerves: No cranial nerve deficit.   Psychiatric:         Mood and Affect: Mood normal.         Behavior: Behavior normal.             Labs  No new labs.    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  He needs to be taken to the emergency room immediately and then we will need to take him to the operating room and do extensive debridement of this area.  He ultimately may need a colostomy depending on how much of the anus I can save.    I discussed extensively with his son.  The patient is very elderly and a high surgical risk but this is a life-threatening emergency.                       Mat Bryant MD

## 2022-08-02 NOTE — ANESTHESIA PROCEDURE NOTES
Arterial Line:    Start time: 8/2/2022 7:34 PM  End time: 8/2/2022 7:34 PM    An arterial line was placed in the OR for the following indication(s): continuous blood pressure monitoring and blood sampling needed.    A 20 G (size), 1 and 3/4 inch (length), Arrow (type) catheter was placed,   Seldinger technique used, into the Left radial artery, secured by Tegaderm.      Procedure performed using ultrasound guidance and surface landmarks.  Image not captured or stored.  Image visualization comments: Ultrasound used to visualize the placement of wire and/or needle in appropriate artery.    Events:  patient tolerated procedure well with no complications.    The supervising anesthesiologist was   Attending: Joy Abraham MD

## 2022-08-02 NOTE — ANESTHESIA PREPROCEDURE EVALUATION
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      He was seen by neurology, he had imaging done as above, and it was determined that the symptoms wee likely due to postural/orthostatic changes related to symptomatic hypotension. His vericiguat was discontinued as it was felt this was the cause of his symptoms.      He noted urinary frequency and urgency on day of discharge, noting it had been ongoing for ~2-3 days. No dysuria, suprapubic tenderness, hematuria, or changes in urine color. UA on 7/22/22 and 7/25/22 negative for leukocyte esterase and nitrites. UA 7/25 did show rare bacteria, but no pyuria, and unclear If clean catch. Repeat UA ordered, but unable to be obtained as patient did not have need to void for several hours, thus no indication for antibiotics at this time.     TTE 4/12/22  •  A complete transthoracic echocardiogram (including 2D, color flow   Doppler, spectral Doppler and M-mode imaging) was performed using the   standard protocol. The study quality was adequate and poor.   •  The left ventricle is normal in size. Endocardium is incompletely   visualized and segmental wall motion abnormalities cannot be excluded.   Moderately  decreased left ventricular ejection fraction. The left   ventricular ejection fraction by visual estimate is 35% to 40%.   •  There is indeterminate diastolic function.   •  The right ventricle is severely dilated. There is moderately to   severely decreased systolic function of the right ventricle.   •  The left atrium is moderately dilated.   •  The right atrium is severely dilated.   •  There is trivial mitral valve leaflet thickening. There is trace mitral   regurgitation. There is no mitral stenosis.   •  The aortic valve is trileaflet. There is mild aortic valve thickening.   There is mild aortic valve calcification. There is no aortic stenosis.   There is mild aortic regurgitation.   •  There is moderate to severe tricuspid regurgitation.   •  The inferior vena cava is dilated (diameter >21 mm) and decreases <50%   in size with inspiration, suggesting an elevated right atrial pressure of   15 mmHg (range 10-20 mm Hg).   •  There is no prior study available for comparison at this organization.       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 Authorizing Provider    acetaminophen (TYLENOL) 325 mg tablet Take by mouth. 7/25/22   John Chambers MD   bisacodyL (DULCOLAX) 10 mg suppository Insert into the rectum. 7/25/22   John Chambers MD   clopidogreL (PLAVIX) 75 mg tablet Take 75 mg by mouth daily. 2/15/22   John Chambers MD   coenzyme Q10 (COQ10) 100 mg capsule Take 100 mg by mouth daily.    John Chambers MD   empagliflozin (JARDIANCE) 10 mg tablet Take 10 mg by mouth daily. 5/9/22   John Chambers MD   levothyroxine (SYNTHROID) 100 mcg tablet Take 100 mcg by mouth daily.    John Chambers MD   metoprolol succinate XL (TOPROL-XL) 100 mg 24 hr tablet Take 100 mg by mouth daily. 4/22/22   John Chambers MD   multivitamin (THERAGRAN) tablet Take by mouth daily.    John Chambers MD   sacubitriL-valsartan (ENTRESTO) 49-51 mg per tablet Take 1 tablet by mouth 2 (two) times a day.    John Chambers MD   sildenafiL, pulm.hypertension, (REVATIO) 20 mg tablet Take 20 mg by mouth 3 (three) times a day. 4/22/22   John Chambers MD   simvastatin (ZOCOR) 10 mg tablet Take 10 mg by mouth nightly. 3/25/22   John Chambers MD   spironolactone (ALDACTONE) 25 mg tablet Take 25 mg by mouth daily. 4/22/22   John Chambers MD   torsemide (DEMADEX) 10 mg tablet Take 10-20 mg by mouth daily. 10 mg if weight is 176-181 lbs, 20 mg if weight is >181 lbs    John Chambers MD   amLODIPine (NORVASC) 2.5 mg tablet amlodipine 2.5 mg tablet  7/22/22  John Chambers MD   apixaban (ELIQUIS) 2.5 mg tablet   7/22/22  John Chambers MD   hydrochlorothiazide (MICROZIDE) 12.5 mg capsule hydrochlorothiazide 12.5 mg capsule  7/22/22  John Chambers MD   omeprazole (PriLOSEC) 20 mg capsule omeprazole 20 mg capsule,delayed release   TAKE 1 CAPSULE BY MOUTH EVERY DAY IN THE MORNING  7/22/22  John Chambers MD       CBC Results       08/02/22 08/01/22 07/26/22     1628 0820 0751    WBC 18.70 15.55  8.49    RBC 4.05 3.95 3.74    HGB 13.8 13.9 13.4    HCT 41.4 41.2 38.6    .2 104.3 103.2    MCH 34.1 35.2 35.8    MCHC 33.3 33.7 34.7     183 153          BMP Results       08/02/22 08/01/22 07/26/22     1628 0820 0751     135 134    K 3.9 3.7 3.3    Cl 106 102 102    CO2 17 21 22    Glucose 173 140 100    BUN 26 22 30    Creatinine 1.2 1.2 1.2    Calcium 8.5 8.5 8.4    Anion Gap 11 12 10    EGFR 56.9 56.9 56.9         Comment for K at 1628 on 08/02/22: Results obtained on plasma. Plasma Potassium values may be up to 0.4 mEQ/L less than serum values. The differences may be greater for patients with high platelet or white cell counts.          No results found for: HCGPREGUR, PREGSERUM, HCG, HCGQUANT    Results from last 7 days   Lab Units 08/02/22  1628   INR  1.4       CT PELVIS WITH IV CONTRAST    (Results Pending)         Physical Exam    Airway   Mallampati: III   TM distance: >3 FB   Neck ROM: full  Cardiovascular - normal   Rhythm: irregular   Rate: normalPulmonary - normal   clear to auscultation  Dental - normal        Anesthesia Plan    Plan: general    Technique: general endotracheal     Lines and Monitors: arterial line, additional IV, PIV and central line   ASA 4 - emergent  Anesthetic plan and risks discussed with: adult children  Induction:    intravenous   Comments:    Plan: Last ate at noon. RSI. Had an extensive discussion with son in regards to need for pre-induction arterial line +/- central line. Discussed that it is possible patient may remain intubated post-operatively

## 2022-08-02 NOTE — H&P
"General Surgery History and Physical    Soft tissue infection; possible necrotizing fasciitis    HPI     Patient is a 89 y.o. male who presents from an outpatient office visit with Dr. Bryant with concern for a necrotizing soft tissue infection of the rectal/perianal area tracking into the perineum and possible scrotal involvement with concern for Justin's gangrene.  Patient history was obtained to the son due to altered mental status and the father.    Son states that last week the patient was taken to the emergency department because of a drop in blood pressure and altered mental status.  The patient was told in the ER that he had proteinuria and that his blood sugar was high-is the first time that the patient had been told that from a physician.  The patient was then transferred to an acute care facility ([facility]) for continued care.    While at [facility]  The patient found it difficult to sit down and a marble sized lesion was noted around the patient's rectum.  The staff thought it was a cyst at the time, but over the next several days the \"cyst\" enlarged.  This morning the patient son noticed that he was altered, his blood pressure had dropped, and the patient was sent for surgical consult with Dr. Bryant.  While in the office, Dr. Bryant performed an incision and drainage and noted anteriorly tracking fluid with concern for Justin's gangrene and sent the patient to the emergency department for further evaluation and operative management.    Medical History:   Past Medical History:   Diagnosis Date   • Atrial fibrillation (CMS/HCC)    • Disease of thyroid gland    • Hypertension    • Myocardial infarction (CMS/HCC)        Surgical History:   Past Surgical History:   Procedure Laterality Date   • BYPASS GRAFT         Social History:   Social History     Socioeconomic History   • Marital status:    Tobacco Use   • Smoking status: Never Smoker   • Smokeless tobacco: Never Used     Social Determinants " of Health     Food Insecurity: No Food Insecurity   • Worried About Running Out of Food in the Last Year: Never true   • Ran Out of Food in the Last Year: Never true       Family History: No family history on file.    Allergies: Patient has no known allergies.    Home Medications:  Not in a hospital admission.    Current Medications:  •  clindamycin, 900 mg, intravenous, Once  •  glucose, 16-32 g of dextrose, oral, PRN **OR** dextrose, 15-30 g of dextrose, oral, PRN **OR** glucagon, 1 mg, intramuscular, PRN **OR** dextrose 50 % in water (D50), 25 mL, intravenous, PRN  •  piperacillin-tazobactam, 3.375 g, intravenous, Once  •  vancomycin, 15 mg/kg, intravenous, Once  •  acetaminophen  •  bisacodyL  •  clopidogreL  •  coenzyme Q10  •  empagliflozin  •  levothyroxine  •  metoprolol succinate XL  •  multivitamin  •  ENTRESTO  •  sildenafiL (pulm.hypertension)  •  simvastatin  •  spironolactone  •  torsemide    Review of Systems  Pertinent items are noted in HPI.    Objective     Vital Signs for the last 24 hours:  Temp:  [36.4 °C (97.5 °F)] 36.4 °C (97.5 °F)  Heart Rate:  [85-87] 87  Resp:  [18] 18  BP: ()/(55-64) 104/55    Physicial Exam    General: awake and alert, well appearing, no acute distress  HEENT: normocephalic, atraumatic, EOM intact, conjunctiva clear, sclera nonicteric  CV: normal rate, normotensive  Pulm: normal work of breathing, no acute distress  Abd: soft, nontender, nondistended  Skin: Large amount of dependent edema in the perineum, scrotum, and penile shaft.  No palpable crepitus on exam.  No appreciable fluctuant mass or other purulent drainage noted on physical exam in the emergency department.  Extr: no obvious abnormality, moving extremities spontaneously  Neuro: Grossly normal      Assessment/Plan      89-year-old male admitted to the general surgery service in preparation for the operating room for possible necrotizing soft tissue infection/Justin's gangrene.   -OR today   -Start  broad-spectrum antibiotics   -IV fluids   -CT of the pelvis with IV contrast   -Factious disease consult, rec appreciated   -Urology consult for possible foreign years gangrene    Joe Eduardo, DO  General Surgery PGY-1  Please page [phone] with any additional questions or concerns

## 2022-08-02 NOTE — ANESTHESIA PROCEDURE NOTES
Central Venous Line:      Start time: 8/2/2022 7:35 PM    End time: 8/2/2022 7:35 PM        A cardiac line was placed in the OR for the following indication(s): central venous access and CVP monitoring.      Sterility preparation included the following: provider hand hygiene performed prior to insertion and all 5 sterile barriers used (gloves, gown, cap, mask, large sterile drape) during insertion.      The patient was placed in Trendelenburg position. Right internal jugular vein was prepped.    The site was prepped with Chlorhexidine.  A 7 Fr (size), 20 (length), triple lumen was placed.  This catheter was not an oximetric catheter.    During the procedure, the following specific steps were taken: target vein identified, needle advanced into vein and blood aspirated and guidewire advanced into vein.  Seldinger technique used      Procedure performed using ultrasound guidance and surface landmarks.  Sterile gel and probe cover used in ultrasound-guided central venous catheter insertion.  Image captured and stored.  Image visualization comments: Ultrasound used to visualize the placement of  wire and/or needle in appropriate vein.    pulmonary artery catheterization not placed             Intravenous verification was obtained by ultrasound and venous blood return.      Post insertion care included: all ports aspirated, all ports flushed easily, guidewire removed intact, Biopatch applied, line sutured in place and dressing applied.    During the procedure the patient experienced: patient tolerated procedure well with no complications.                    Staffing  Performed: anesthesiologist   Anesthesiologist: Joy Abraham MD

## 2022-08-02 NOTE — ANESTHESIOLOGIST PRE-PROCEDURE ATTESTATION
Pre-Procedure Patient Identification:  I am the Primary Anesthesiologist and have identified the patient on 08/02/22 at 6:09 PM.   I have confirmed the procedure(s) will be performed by the following surgeon/proceduralist Nonanci, Mat SIGALA MD.

## 2022-08-02 NOTE — ANESTHESIA PROCEDURE NOTES
Airway  Urgency: elective    Start Time: 8/2/2022 6:54 PM  Airway not difficult    General Information and Staff    Patient location during procedure: OR  Anesthesiologist: Joy Abraham MD  Performed: anesthesiologist     Indications and Patient Condition  Indications for airway management: anesthesia  Sedation level: deep  Preoxygenated: yes  Patient position: sniffing  Mask difficulty assessment: 0 - not attempted    Final Airway Details  Final airway type: endotracheal airway      Successful airway: ETT wEVAC  Cuffed: yes   Successful intubation technique: video laryngoscopy  Facilitating devices/methods: intubating stylet  Endotracheal tube insertion site: oral  Blade: Brody  Blade size: #4  ETT size (mm): 8.0  Cormack-Lehane Classification: grade I - full view of glottis  Placement verified by: chest auscultation and capnometry   Measured from: lips  ETT to lips (cm): 24  Number of attempts at approach: 1  Number of other approaches attempted: 0  Atraumatic airway insertion

## 2022-08-02 NOTE — ED PROVIDER NOTES
Emergency Medicine Note  HPI   HISTORY OF PRESENT ILLNESS     Patient sent from physicians office for abscess      Abscess of buttock with concern for extension into perineum      Abscess  Location:  Pelvis  Abscess quality: draining and redness    Progression:  Worsening  Chronicity:  Recurrent  Associated symptoms: fever          Patient History   PAST HISTORY     Reviewed from Nursing Triage:         Past Medical History:   Diagnosis Date   • Atrial fibrillation (CMS/HCC)    • Disease of thyroid gland    • Hypertension    • Myocardial infarction (CMS/HCC)        Past Surgical History:   Procedure Laterality Date   • BYPASS GRAFT         No family history on file.    Social History     Tobacco Use   • Smoking status: Never Smoker   • Smokeless tobacco: Never Used         Review of Systems   REVIEW OF SYSTEMS     Review of Systems   Constitutional: Positive for fever.   Respiratory: Negative for apnea.    Gastrointestinal: Negative for abdominal distention.   Musculoskeletal: Negative for arthralgias.   Psychiatric/Behavioral: Negative for agitation.   All other systems reviewed and are negative.        VITALS     ED Vitals    Date/Time Temp Pulse Resp BP SpO2 [facility]   08/02/22 1610 36.4 °C (97.5 °F) 87 18 104/55 95 % HW        Pulse Ox %: 98 % (08/02/22 1622)              Physical Exam   PHYSICAL EXAM     Physical Exam  Vitals and nursing note reviewed.   Constitutional:       Appearance: He is well-developed. He is not ill-appearing.   HENT:      Head: Normocephalic and atraumatic.   Eyes:      Conjunctiva/sclera: Conjunctivae normal.   Cardiovascular:      Rate and Rhythm: Normal rate and regular rhythm.   Pulmonary:      Effort: Pulmonary effort is normal.      Breath sounds: Normal breath sounds.   Abdominal:      General: There is no distension.      Palpations: Abdomen is soft. There is no mass.      Tenderness: There is no abdominal tenderness.   Genitourinary:     Comments: Abscess- gluteal  region      Swlling of penis and testicles  Musculoskeletal:         General: No tenderness or deformity. Normal range of motion.      Cervical back: Normal range of motion.   Skin:     General: Skin is warm and dry.   Neurological:      General: No focal deficit present.      Mental Status: Mental status is at baseline.   Psychiatric:         Behavior: Behavior normal.           PROCEDURES     Critical Care  Performed by: Marcello Trinh MD  Authorized by: Marcello Trinh MD     Critical care provider statement:     Critical care time (minutes):  45    Critical care was necessary to treat or prevent imminent or life-threatening deterioration of the following conditions:  Shock    Critical care was time spent personally by me on the following activities:  Discussions with consultants, re-evaluation of patient's condition and review of old charts         DATA     Results     None          Imaging Results    None         No orders to display       Scoring tools                                 ED Course & MDM   MDM / ED COURSE / CLINICAL IMPRESSIONS / DISPO     MDM     Hypotensve    Given broad spectrum ab/ fluids and taken to the OR           Marcello Trinh MD  08/04/22 5421

## 2022-08-02 NOTE — Clinical Note
The left neck was clipped, marked and prepped with ChloraPrep. The patient was draped in a sterile fashion after allowing for the recommended dry time.

## 2022-08-03 ENCOUNTER — ANESTHESIA (INPATIENT)
Dept: OPERATING ROOM | Facility: HOSPITAL | Age: 87
DRG: 717 | End: 2022-08-03
Payer: MEDICARE

## 2022-08-03 ENCOUNTER — APPOINTMENT (INPATIENT)
Dept: CARDIOLOGY | Facility: HOSPITAL | Age: 87
DRG: 717 | End: 2022-08-03
Payer: MEDICARE

## 2022-08-03 ENCOUNTER — APPOINTMENT (INPATIENT)
Dept: RADIOLOGY | Facility: HOSPITAL | Age: 87
DRG: 717 | End: 2022-08-03
Payer: MEDICARE

## 2022-08-03 ENCOUNTER — ANESTHESIA EVENT (INPATIENT)
Dept: OPERATING ROOM | Facility: HOSPITAL | Age: 87
DRG: 717 | End: 2022-08-03
Payer: MEDICARE

## 2022-08-03 ENCOUNTER — APPOINTMENT (INPATIENT)
Dept: RADIOLOGY | Facility: HOSPITAL | Age: 87
DRG: 717 | End: 2022-08-03
Attending: STUDENT IN AN ORGANIZED HEALTH CARE EDUCATION/TRAINING PROGRAM
Payer: MEDICARE

## 2022-08-03 LAB
ALBUMIN SERPL-MCNC: 2.3 G/DL (ref 3.4–5)
ALP SERPL-CCNC: 101 IU/L (ref 35–126)
ALT SERPL-CCNC: 32 IU/L (ref 16–63)
ANION GAP SERPL CALC-SCNC: 11 MEQ/L (ref 3–15)
ANION GAP SERPL CALC-SCNC: 7 MEQ/L (ref 3–15)
APTT PPP: 28 SEC (ref 23–35)
AST SERPL-CCNC: 72 IU/L (ref 15–41)
ATRIAL RATE: 441
ATRIAL RATE: 58
ATRIAL RATE: 80
BASE EXCESS BLDA CALC-SCNC: -11.4 MEQ/L
BASE EXCESS BLDA CALC-SCNC: -11.7 MEQ/L
BASE EXCESS BLDA CALC-SCNC: -11.7 MEQ/L
BASE EXCESS BLDA CALC-SCNC: -5.5 MEQ/L
BASE EXCESS BLDA CALC-SCNC: -7.1 MEQ/L
BASE EXCESS BLDA CALC-SCNC: -7.3 MEQ/L
BILIRUB SERPL-MCNC: 2.6 MG/DL (ref 0.3–1.2)
BSA FOR ECHO PROCEDURE: 2.03 M2
BUN SERPL-MCNC: 28 MG/DL (ref 8–20)
BUN SERPL-MCNC: 34 MG/DL (ref 8–20)
CA-I BLD-SCNC: 1.14 MMOL/L (ref 1.15–1.27)
CA-I BLD-SCNC: 1.14 MMOL/L (ref 1.15–1.27)
CA-I BLD-SCNC: 1.15 MMOL/L (ref 1.15–1.27)
CA-I BLD-SCNC: 1.21 MMOL/L (ref 1.15–1.27)
CA-I BLD-SCNC: 1.25 MMOL/L (ref 1.15–1.27)
CA-I BLD-SCNC: 1.53 MMOL/L (ref 1.15–1.27)
CALCIUM SERPL-MCNC: 8.1 MG/DL (ref 8.9–10.3)
CALCIUM SERPL-MCNC: 8.3 MG/DL (ref 8.9–10.3)
CHLORIDE BLDA-SCNC: 106 MEQ/L (ref 98–109)
CHLORIDE BLDA-SCNC: 107 MEQ/L (ref 98–109)
CHLORIDE BLDA-SCNC: 108 MEQ/L (ref 98–109)
CHLORIDE SERPL-SCNC: 110 MEQ/L (ref 98–109)
CHLORIDE SERPL-SCNC: 111 MEQ/L (ref 98–109)
CO2 BLDA-SCNC: 17.1 MEQ/L (ref 22–32)
CO2 BLDA-SCNC: 18.9 MEQ/L (ref 22–32)
CO2 BLDA-SCNC: 19.7 MEQ/L (ref 22–32)
CO2 BLDA-SCNC: 20.6 MEQ/L (ref 22–32)
CO2 BLDA-SCNC: 20.7 MEQ/L (ref 22–32)
CO2 BLDA-SCNC: 22.1 MEQ/L (ref 22–32)
CO2 SERPL-SCNC: 18 MEQ/L (ref 22–32)
CO2 SERPL-SCNC: 18 MEQ/L (ref 22–32)
COHGB MFR BLD: 1.8 %
COHGB MFR BLD: 1.9 %
COHGB MFR BLD: 1.9 %
COHGB MFR BLD: 2.1 %
COHGB MFR BLD: 2.2 %
COHGB MFR BLD: 2.2 %
CREAT SERPL-MCNC: 1.1 MG/DL (ref 0.8–1.3)
CREAT SERPL-MCNC: 1.3 MG/DL (ref 0.8–1.3)
ERYTHROCYTE [DISTWIDTH] IN BLOOD BY AUTOMATED COUNT: 14.3 % (ref 11.6–14.4)
ERYTHROCYTE [DISTWIDTH] IN BLOOD BY AUTOMATED COUNT: 14.8 % (ref 11.6–14.4)
FIO2 ON VENT: 40 %
FIO2 ON VENT: 60 %
FIO2 ON VENT: ABNORMAL %
GFR SERPL CREATININE-BSD FRML MDRD: 51.9 ML/MIN/1.73M*2
GFR SERPL CREATININE-BSD FRML MDRD: >60 ML/MIN/1.73M*2
GLUCOSE BLD-MCNC: 148 MG/DL (ref 70–99)
GLUCOSE BLDA-MCNC: 172 MG/DL (ref 70–99)
GLUCOSE BLDA-MCNC: 172 MG/DL (ref 70–99)
GLUCOSE BLDA-MCNC: 178 MG/DL (ref 70–99)
GLUCOSE BLDA-MCNC: 191 MG/DL (ref 70–99)
GLUCOSE BLDA-MCNC: 195 MG/DL (ref 70–99)
GLUCOSE BLDA-MCNC: 201 MG/DL (ref 70–99)
GLUCOSE SERPL-MCNC: 182 MG/DL (ref 70–99)
GLUCOSE SERPL-MCNC: 190 MG/DL (ref 70–99)
HCO3 BLDA-SCNC: 15.4 MEQ/L (ref 21–28)
HCO3 BLDA-SCNC: 15.8 MEQ/L (ref 21–28)
HCO3 BLDA-SCNC: 16 MEQ/L (ref 21–28)
HCO3 BLDA-SCNC: 19.2 MEQ/L (ref 21–28)
HCO3 BLDA-SCNC: 19.4 MEQ/L (ref 21–28)
HCO3 BLDA-SCNC: 20.6 MEQ/L (ref 21–28)
HCT VFR BLDCO AUTO: 36.2 % (ref 40.1–51)
HCT VFR BLDCO AUTO: 36.2 % (ref 40.1–51)
HGB BLD-MCNC: 12.1 G/DL (ref 13.7–17.5)
HGB BLD-MCNC: 12.4 G/DL (ref 13.7–17.5)
HGB BLDA OXIMETRY-MCNC: 12.5 G/DL (ref 14–17.5)
HGB BLDA OXIMETRY-MCNC: 12.6 G/DL (ref 14–17.5)
HGB BLDA OXIMETRY-MCNC: 12.8 G/DL (ref 14–17.5)
HGB BLDA OXIMETRY-MCNC: 12.9 G/DL (ref 14–17.5)
HGB BLDA OXIMETRY-MCNC: 13 G/DL (ref 14–17.5)
HGB BLDA OXIMETRY-MCNC: 13.2 G/DL (ref 14–17.5)
INHALED O2 CONCENTRATION: ABNORMAL %
INR PPP: 1.6
INR PPP: 2
LACTATE BLDA-SCNC: 1.6 MMOL/L (ref 0.4–1.6)
LACTATE BLDA-SCNC: 1.8 MMOL/L (ref 0.4–1.6)
LACTATE BLDA-SCNC: 2 MMOL/L (ref 0.4–1.6)
LACTATE BLDA-SCNC: 2.1 MMOL/L (ref 0.4–1.6)
LACTATE BLDA-SCNC: 2.2 MMOL/L (ref 0.4–1.6)
LACTATE BLDA-SCNC: 2.2 MMOL/L (ref 0.4–1.6)
LACTATE SERPL-SCNC: 2 MMOL/L (ref 0.4–2)
LAD 2D: 4.1 CM
MAGNESIUM SERPL-MCNC: 1.9 MG/DL (ref 1.8–2.5)
MAGNESIUM SERPL-MCNC: 2.2 MG/DL (ref 1.8–2.5)
MCH RBC QN AUTO: 34.2 PG (ref 28–33.2)
MCH RBC QN AUTO: 35.7 PG (ref 28–33.2)
MCHC RBC AUTO-ENTMCNC: 33.4 G/DL (ref 32.2–36.5)
MCHC RBC AUTO-ENTMCNC: 34.3 G/DL (ref 32.2–36.5)
MCV RBC AUTO: 102.3 FL (ref 83–98)
MCV RBC AUTO: 104.3 FL (ref 83–98)
METHGB BLD-SCNC: 0.3 % (ref 0.4–1.5)
METHGB BLD-SCNC: 0.6 % (ref 0.4–1.5)
METHGB BLD-SCNC: 0.8 % (ref 0.4–1.5)
MRSA DNA SPEC QL NAA+PROBE: NEGATIVE
PCO2 BLDA: 27 MM HG (ref 35–48)
PCO2 BLDA: 29 MM HG (ref 35–48)
PCO2 BLDA: 42 MM HG (ref 35–48)
PCO2 BLDA: 54 MM HG (ref 35–48)
PCO2 BLDA: 62 MM HG (ref 35–48)
PCO2 BLDA: 72 MM HG (ref 35–48)
PDW BLD AUTO: 10.1 FL (ref 9.4–12.4)
PDW BLD AUTO: 9.8 FL (ref 9.4–12.4)
PH BLDA: 7.05 [PH] (ref 7.35–7.45)
PH BLDA: 7.09 [PH] (ref 7.35–7.45)
PH BLDA: 7.13 [PH] (ref 7.35–7.45)
PH BLDA: 7.27 [PH] (ref 7.35–7.45)
PH BLDA: 7.39 [PH] (ref 7.35–7.45)
PH BLDA: 7.4 [PH] (ref 7.35–7.45)
PHOSPHATE SERPL-MCNC: 4.1 MG/DL (ref 2.4–4.7)
PHOSPHATE SERPL-MCNC: 5.8 MG/DL (ref 2.4–4.7)
PLATELET # BLD AUTO: 145 K/UL (ref 150–350)
PLATELET # BLD AUTO: 151 K/UL (ref 150–350)
PO2 BLDA: 128 MM HG (ref 83–100)
PO2 BLDA: 133 MM HG (ref 83–100)
PO2 BLDA: 135 MM HG (ref 83–100)
PO2 BLDA: 155 MM HG (ref 83–100)
PO2 BLDA: 178 MM HG (ref 83–100)
PO2 BLDA: 62 MM HG (ref 83–100)
POCT TEST: ABNORMAL
POTASSIUM BLDA-SCNC: 3.9 MEQ/L (ref 3.4–4.5)
POTASSIUM BLDA-SCNC: 4.2 MEQ/L (ref 3.4–4.5)
POTASSIUM BLDA-SCNC: 4.3 MEQ/L (ref 3.4–4.5)
POTASSIUM BLDA-SCNC: 4.5 MEQ/L (ref 3.4–4.5)
POTASSIUM BLDA-SCNC: 4.6 MEQ/L (ref 3.4–4.5)
POTASSIUM BLDA-SCNC: 4.9 MEQ/L (ref 3.4–4.5)
POTASSIUM SERPL-SCNC: 3.9 MEQ/L (ref 3.6–5.1)
POTASSIUM SERPL-SCNC: 4.3 MEQ/L (ref 3.6–5.1)
PROT SERPL-MCNC: 5.1 G/DL (ref 6–8.2)
PROTHROMBIN TIME: 19.1 SEC (ref 12.2–14.5)
PROTHROMBIN TIME: 22.1 SEC (ref 12.2–14.5)
QRS DURATION: 166
QRS DURATION: 170
QRS DURATION: 174
QT INTERVAL: 430
QT INTERVAL: 450
QT INTERVAL: 502
QTC CALCULATION(BAZETT): 478
QTC CALCULATION(BAZETT): 502
QTC CALCULATION(BAZETT): 508
R AXIS: 64
R AXIS: 71
R AXIS: 78
RAP: 20 MMHG
RBC # BLD AUTO: 3.47 M/UL (ref 4.5–5.8)
RBC # BLD AUTO: 3.54 M/UL (ref 4.5–5.8)
RIGHT ATRIAL LENGTH MEDIAL-LATERAL (APICAL 4-CHAMBER VIEW): 8.51 CM
SAO2 % BLDA: 80 % (ref 93–98)
SAO2 % BLDA: 97 % (ref 93–98)
SEPTAL TISSUE DOPPLER FREE WALL LATE DIA VELOCITY (APICAL 4 CHAMBER VIEW): 0.07 M/S
SODIUM BLDA-SCNC: 132 MEQ/L (ref 136–145)
SODIUM BLDA-SCNC: 133 MEQ/L (ref 136–145)
SODIUM BLDA-SCNC: 134 MEQ/L (ref 136–145)
SODIUM BLDA-SCNC: 135 MEQ/L (ref 136–145)
SODIUM BLDA-SCNC: 136 MEQ/L (ref 136–145)
SODIUM BLDA-SCNC: 137 MEQ/L (ref 136–145)
SODIUM SERPL-SCNC: 135 MEQ/L (ref 136–144)
SODIUM SERPL-SCNC: 140 MEQ/L (ref 136–144)
T WAVE AXIS: -41
T WAVE AXIS: 38
T WAVE AXIS: 43
TAPSE: 1.07 CM
TR MAX PG: 10 MMHG
TR VTI: 48.6 CM
TRICUSPID VALVE PEAK REGURGITATION VELOCITY: 1.55 M/S
TV AREA PISA: 0.37 CM2
TV COMP DIAMETER: 7.7 CM
TV MEAN GRADIENT: 0 MMHG
TV MV D: 0.6 M/S
TV PEAK GRADIENT: 1 MMHG
TV REGURGITANT FRACTION: 5 %
TV REGURGITANT VOLUME: 18 CM3
VENTRICULAR RATE: 60
VENTRICULAR RATE: 68
VENTRICULAR RATE: 84
WBC # BLD AUTO: 18.19 K/UL (ref 3.8–10.5)
WBC # BLD AUTO: 23.29 K/UL (ref 3.8–10.5)

## 2022-08-03 PROCEDURE — 85610 PROTHROMBIN TIME: CPT

## 2022-08-03 PROCEDURE — 25800000 HC PHARMACY IV SOLUTIONS: Performed by: SURGERY

## 2022-08-03 PROCEDURE — 83735 ASSAY OF MAGNESIUM: CPT | Performed by: STUDENT IN AN ORGANIZED HEALTH CARE EDUCATION/TRAINING PROGRAM

## 2022-08-03 PROCEDURE — 36000004 HC OR LEVEL 4 INITIAL 30MIN: Performed by: COLON & RECTAL SURGERY

## 2022-08-03 PROCEDURE — 44188 LAP COLOSTOMY: CPT | Performed by: COLON & RECTAL SURGERY

## 2022-08-03 PROCEDURE — 25800000 HC PHARMACY IV SOLUTIONS: Performed by: ANESTHESIOLOGY

## 2022-08-03 PROCEDURE — 74018 RADEX ABDOMEN 1 VIEW: CPT

## 2022-08-03 PROCEDURE — 25000000 HC PHARMACY GENERAL

## 2022-08-03 PROCEDURE — 83605 ASSAY OF LACTIC ACID: CPT

## 2022-08-03 PROCEDURE — 25000000 HC PHARMACY GENERAL: Performed by: STUDENT IN AN ORGANIZED HEALTH CARE EDUCATION/TRAINING PROGRAM

## 2022-08-03 PROCEDURE — 27200000 HC STERILE SUPPLY: Performed by: COLON & RECTAL SURGERY

## 2022-08-03 PROCEDURE — 25000000 HC PHARMACY GENERAL: Performed by: SURGERY

## 2022-08-03 PROCEDURE — 85018 HEMOGLOBIN: CPT | Performed by: SURGERY

## 2022-08-03 PROCEDURE — 25800000 HC PHARMACY IV SOLUTIONS: Performed by: STUDENT IN AN ORGANIZED HEALTH CARE EDUCATION/TRAINING PROGRAM

## 2022-08-03 PROCEDURE — 84295 ASSAY OF SERUM SODIUM: CPT | Performed by: ANESTHESIOLOGY

## 2022-08-03 PROCEDURE — 80048 BASIC METABOLIC PNL TOTAL CA: CPT | Performed by: STUDENT IN AN ORGANIZED HEALTH CARE EDUCATION/TRAINING PROGRAM

## 2022-08-03 PROCEDURE — 83735 ASSAY OF MAGNESIUM: CPT

## 2022-08-03 PROCEDURE — 63600000 HC DRUGS/DETAIL CODE: Performed by: STUDENT IN AN ORGANIZED HEALTH CARE EDUCATION/TRAINING PROGRAM

## 2022-08-03 PROCEDURE — 63600000 HC DRUGS/DETAIL CODE: Performed by: ANESTHESIOLOGY

## 2022-08-03 PROCEDURE — 85018 HEMOGLOBIN: CPT | Performed by: COLON & RECTAL SURGERY

## 2022-08-03 PROCEDURE — 20000000 HC ROOM AND CARE ICU

## 2022-08-03 PROCEDURE — 93321 DOPPLER ECHO F-UP/LMTD STD: CPT | Mod: 26 | Performed by: INTERNAL MEDICINE

## 2022-08-03 PROCEDURE — 25000000 HC PHARMACY GENERAL: Performed by: COLON & RECTAL SURGERY

## 2022-08-03 PROCEDURE — 85018 HEMOGLOBIN: CPT

## 2022-08-03 PROCEDURE — 82805 BLOOD GASES W/O2 SATURATION: CPT | Performed by: STUDENT IN AN ORGANIZED HEALTH CARE EDUCATION/TRAINING PROGRAM

## 2022-08-03 PROCEDURE — 93010 ELECTROCARDIOGRAM REPORT: CPT | Mod: 76 | Performed by: INTERNAL MEDICINE

## 2022-08-03 PROCEDURE — 85610 PROTHROMBIN TIME: CPT | Performed by: STUDENT IN AN ORGANIZED HEALTH CARE EDUCATION/TRAINING PROGRAM

## 2022-08-03 PROCEDURE — 99292 CRITICAL CARE ADDL 30 MIN: CPT | Performed by: SURGERY

## 2022-08-03 PROCEDURE — 37000001 HC ANESTHESIA GENERAL: Performed by: COLON & RECTAL SURGERY

## 2022-08-03 PROCEDURE — 36000014 HC OR LEVEL 4 EA ADDL MIN: Performed by: COLON & RECTAL SURGERY

## 2022-08-03 PROCEDURE — 25000000 HC PHARMACY GENERAL: Performed by: ANESTHESIOLOGY

## 2022-08-03 PROCEDURE — 3E1H78Z IRRIGATION OF LOWER GI USING IRRIGATING SUBSTANCE, VIA NATURAL OR ARTIFICIAL OPENING: ICD-10-PCS | Performed by: COLON & RECTAL SURGERY

## 2022-08-03 PROCEDURE — 85027 COMPLETE CBC AUTOMATED: CPT | Performed by: STUDENT IN AN ORGANIZED HEALTH CARE EDUCATION/TRAINING PROGRAM

## 2022-08-03 PROCEDURE — P9045 ALBUMIN (HUMAN), 5%, 250 ML: HCPCS | Performed by: STUDENT IN AN ORGANIZED HEALTH CARE EDUCATION/TRAINING PROGRAM

## 2022-08-03 PROCEDURE — 84295 ASSAY OF SERUM SODIUM: CPT

## 2022-08-03 PROCEDURE — 93005 ELECTROCARDIOGRAM TRACING: CPT | Performed by: COLON & RECTAL SURGERY

## 2022-08-03 PROCEDURE — 93325 DOPPLER ECHO COLOR FLOW MAPG: CPT | Mod: 26 | Performed by: INTERNAL MEDICINE

## 2022-08-03 PROCEDURE — 25800000 HC PHARMACY IV SOLUTIONS

## 2022-08-03 PROCEDURE — 63600000 HC DRUGS/DETAIL CODE: Performed by: SURGERY

## 2022-08-03 PROCEDURE — 0D1L4Z4 BYPASS TRANSVERSE COLON TO CUTANEOUS, PERCUTANEOUS ENDOSCOPIC APPROACH: ICD-10-PCS | Performed by: COLON & RECTAL SURGERY

## 2022-08-03 PROCEDURE — 36415 COLL VENOUS BLD VENIPUNCTURE: CPT

## 2022-08-03 PROCEDURE — 36430 TRANSFUSION BLD/BLD COMPNT: CPT

## 2022-08-03 PROCEDURE — 85027 COMPLETE CBC AUTOMATED: CPT

## 2022-08-03 PROCEDURE — P9047 ALBUMIN (HUMAN), 25%, 50ML: HCPCS | Performed by: STUDENT IN AN ORGANIZED HEALTH CARE EDUCATION/TRAINING PROGRAM

## 2022-08-03 PROCEDURE — 93308 TTE F-UP OR LMTD: CPT | Mod: 26 | Performed by: INTERNAL MEDICINE

## 2022-08-03 PROCEDURE — 99291 CRITICAL CARE FIRST HOUR: CPT | Performed by: SURGERY

## 2022-08-03 PROCEDURE — 84100 ASSAY OF PHOSPHORUS: CPT | Performed by: STUDENT IN AN ORGANIZED HEALTH CARE EDUCATION/TRAINING PROGRAM

## 2022-08-03 PROCEDURE — 63600000 HC DRUGS/DETAIL CODE

## 2022-08-03 PROCEDURE — 93010 ELECTROCARDIOGRAM REPORT: CPT | Performed by: INTERNAL MEDICINE

## 2022-08-03 PROCEDURE — P9059 PLASMA, FRZ BETWEEN 8-24HOUR: HCPCS

## 2022-08-03 PROCEDURE — 84100 ASSAY OF PHOSPHORUS: CPT

## 2022-08-03 PROCEDURE — 71045 X-RAY EXAM CHEST 1 VIEW: CPT

## 2022-08-03 PROCEDURE — 94003 VENT MGMT INPAT SUBQ DAY: CPT

## 2022-08-03 PROCEDURE — 85730 THROMBOPLASTIN TIME PARTIAL: CPT | Performed by: STUDENT IN AN ORGANIZED HEALTH CARE EDUCATION/TRAINING PROGRAM

## 2022-08-03 PROCEDURE — 99223 1ST HOSP IP/OBS HIGH 75: CPT | Performed by: INTERNAL MEDICINE

## 2022-08-03 PROCEDURE — 80053 COMPREHEN METABOLIC PANEL: CPT

## 2022-08-03 PROCEDURE — 93321 DOPPLER ECHO F-UP/LMTD STD: CPT

## 2022-08-03 RX ORDER — HEPARIN SODIUM 5000 [USP'U]/ML
5000 INJECTION, SOLUTION INTRAVENOUS; SUBCUTANEOUS EVERY 8 HOURS
Status: DISCONTINUED | OUTPATIENT
Start: 2022-08-03 | End: 2022-08-23 | Stop reason: HOSPADM

## 2022-08-03 RX ORDER — INDOCYANINE GREEN AND WATER 25 MG
KIT INJECTION AS NEEDED
Status: DISCONTINUED | OUTPATIENT
Start: 2022-08-03 | End: 2022-08-03 | Stop reason: SURG

## 2022-08-03 RX ORDER — BUPIVACAINE HYDROCHLORIDE 5 MG/ML
INJECTION, SOLUTION EPIDURAL; INTRACAUDAL
Status: DISCONTINUED | OUTPATIENT
Start: 2022-08-03 | End: 2022-08-03 | Stop reason: HOSPADM

## 2022-08-03 RX ORDER — ALBUMIN HUMAN 250 G/1000ML
12.5 SOLUTION INTRAVENOUS ONCE
Status: COMPLETED | OUTPATIENT
Start: 2022-08-03 | End: 2022-08-03

## 2022-08-03 RX ORDER — SODIUM BICARBONATE 1 MEQ/ML
SYRINGE (ML) INTRAVENOUS AS NEEDED
Status: DISCONTINUED | OUTPATIENT
Start: 2022-08-03 | End: 2022-08-03 | Stop reason: SURG

## 2022-08-03 RX ORDER — CALCIUM CHLORIDE INJECTION 100 MG/ML
INJECTION, SOLUTION INTRAVENOUS AS NEEDED
Status: DISCONTINUED | OUTPATIENT
Start: 2022-08-03 | End: 2022-08-03 | Stop reason: SURG

## 2022-08-03 RX ORDER — CLINDAMYCIN PHOSPHATE 900 MG/50ML
900 INJECTION, SOLUTION INTRAVENOUS
Status: DISCONTINUED | OUTPATIENT
Start: 2022-08-03 | End: 2022-08-08

## 2022-08-03 RX ORDER — SODIUM BICARBONATE 1 MEQ/ML
50 SYRINGE (ML) INTRAVENOUS ONCE
Status: COMPLETED | OUTPATIENT
Start: 2022-08-03 | End: 2022-08-03

## 2022-08-03 RX ORDER — FENTANYL CITRATE 50 UG/ML
INJECTION, SOLUTION INTRAMUSCULAR; INTRAVENOUS AS NEEDED
Status: DISCONTINUED | OUTPATIENT
Start: 2022-08-03 | End: 2022-08-03 | Stop reason: SURG

## 2022-08-03 RX ORDER — METOPROLOL TARTRATE 1 MG/ML
2.5 INJECTION, SOLUTION INTRAVENOUS
Status: DISCONTINUED | OUTPATIENT
Start: 2022-08-03 | End: 2022-08-13

## 2022-08-03 RX ORDER — ALBUMIN HUMAN 50 G/1000ML
50 SOLUTION INTRAVENOUS ONCE
Status: DISCONTINUED | OUTPATIENT
Start: 2022-08-03 | End: 2022-08-03

## 2022-08-03 RX ORDER — ALBUMIN HUMAN 50 G/1000ML
25 SOLUTION INTRAVENOUS ONCE
Status: DISCONTINUED | OUTPATIENT
Start: 2022-08-03 | End: 2022-08-03

## 2022-08-03 RX ORDER — ALBUMIN HUMAN 50 G/1000ML
25 SOLUTION INTRAVENOUS ONCE
Status: COMPLETED | OUTPATIENT
Start: 2022-08-03 | End: 2022-08-03

## 2022-08-03 RX ORDER — NOREPINEPHRINE BITARTRATE 0.02 MG/ML
INJECTION, SOLUTION INTRAVENOUS AS NEEDED
Status: DISCONTINUED | OUTPATIENT
Start: 2022-08-03 | End: 2022-08-03 | Stop reason: SURG

## 2022-08-03 RX ORDER — SODIUM CHLORIDE, SODIUM GLUCONATE, SODIUM ACETATE, POTASSIUM CHLORIDE AND MAGNESIUM CHLORIDE 30; 37; 368; 526; 502 MG/100ML; MG/100ML; MG/100ML; MG/100ML; MG/100ML
INJECTION, SOLUTION INTRAVENOUS CONTINUOUS PRN
Status: DISCONTINUED | OUTPATIENT
Start: 2022-08-03 | End: 2022-08-03 | Stop reason: SURG

## 2022-08-03 RX ORDER — VANCOMYCIN HYDROCHLORIDE
15 ONCE
Status: DISCONTINUED | OUTPATIENT
Start: 2022-08-03 | End: 2022-08-03

## 2022-08-03 RX ORDER — VASOPRESSIN 20 U/ML
INJECTION PARENTERAL AS NEEDED
Status: DISCONTINUED | OUTPATIENT
Start: 2022-08-03 | End: 2022-08-03 | Stop reason: SURG

## 2022-08-03 RX ORDER — SODIUM CHLORIDE 9 MG/ML
5 INJECTION, SOLUTION INTRAVENOUS AS NEEDED
Status: ACTIVE | OUTPATIENT
Start: 2022-08-03 | End: 2022-08-04

## 2022-08-03 RX ORDER — CLINDAMYCIN PHOSPHATE 900 MG/50ML
900 INJECTION, SOLUTION INTRAVENOUS ONCE
Status: DISCONTINUED | OUTPATIENT
Start: 2022-08-03 | End: 2022-08-03

## 2022-08-03 RX ORDER — CLINDAMYCIN PHOSPHATE 900 MG/50ML
900 INJECTION, SOLUTION INTRAVENOUS
Status: DISCONTINUED | OUTPATIENT
Start: 2022-08-03 | End: 2022-08-03

## 2022-08-03 RX ORDER — INSULIN ASPART 100 [IU]/ML
3-5 INJECTION, SOLUTION INTRAVENOUS; SUBCUTANEOUS
Status: DISCONTINUED | OUTPATIENT
Start: 2022-08-03 | End: 2022-08-08

## 2022-08-03 RX ORDER — ALBUMIN HUMAN 50 G/1000ML
12.5 SOLUTION INTRAVENOUS ONCE
Status: COMPLETED | OUTPATIENT
Start: 2022-08-03 | End: 2022-08-03

## 2022-08-03 RX ORDER — CALCIUM GLUCONATE 20 MG/ML
1 INJECTION, SOLUTION INTRAVENOUS ONCE
Status: COMPLETED | OUTPATIENT
Start: 2022-08-03 | End: 2022-08-03

## 2022-08-03 RX ORDER — FUROSEMIDE 10 MG/ML
40 INJECTION INTRAMUSCULAR; INTRAVENOUS ONCE
Status: DISCONTINUED | OUTPATIENT
Start: 2022-08-03 | End: 2022-08-03

## 2022-08-03 RX ORDER — ROCURONIUM BROMIDE 10 MG/ML
INJECTION, SOLUTION INTRAVENOUS AS NEEDED
Status: DISCONTINUED | OUTPATIENT
Start: 2022-08-03 | End: 2022-08-03 | Stop reason: SURG

## 2022-08-03 RX ORDER — HYDROMORPHONE HYDROCHLORIDE 1 MG/ML
0.5 INJECTION, SOLUTION INTRAMUSCULAR; INTRAVENOUS; SUBCUTANEOUS
Status: DISCONTINUED | OUTPATIENT
Start: 2022-08-03 | End: 2022-08-11

## 2022-08-03 RX ORDER — FUROSEMIDE 10 MG/ML
INJECTION INTRAMUSCULAR; INTRAVENOUS AS NEEDED
Status: DISCONTINUED | OUTPATIENT
Start: 2022-08-03 | End: 2022-08-03 | Stop reason: SURG

## 2022-08-03 RX ADMIN — ALBUMIN (HUMAN) 12.5 G: 12.5 SOLUTION INTRAVENOUS at 01:24

## 2022-08-03 RX ADMIN — PIPERACILLIN AND TAZOBACTAM 4.5 G: 4; .5 INJECTION, POWDER, LYOPHILIZED, FOR SOLUTION INTRAVENOUS; PARENTERAL at 06:19

## 2022-08-03 RX ADMIN — Medication 6 MCG/MIN: at 11:44

## 2022-08-03 RX ADMIN — CLINDAMYCIN PHOSPHATE 900 MG: 900 INJECTION, SOLUTION INTRAVENOUS at 14:44

## 2022-08-03 RX ADMIN — CALCIUM GLUCONATE 1 G: 20 INJECTION, SOLUTION INTRAVENOUS at 02:54

## 2022-08-03 RX ADMIN — Medication 1 UNITS: at 18:01

## 2022-08-03 RX ADMIN — Medication 10 MCG/MIN: at 00:46

## 2022-08-03 RX ADMIN — FUROSEMIDE 40 MG: 10 INJECTION INTRAMUSCULAR; INTRAVENOUS at 18:13

## 2022-08-03 RX ADMIN — CALCIUM CHLORIDE 500 MG: 100 INJECTION, SOLUTION INTRAVENOUS at 16:45

## 2022-08-03 RX ADMIN — PANTOPRAZOLE SODIUM 40 MG: 40 INJECTION, POWDER, FOR SOLUTION INTRAVENOUS at 08:19

## 2022-08-03 RX ADMIN — HEPARIN SODIUM 5000 UNITS: 5000 INJECTION, SOLUTION INTRAVENOUS; SUBCUTANEOUS at 14:22

## 2022-08-03 RX ADMIN — ROCURONIUM BROMIDE 50 MG: 10 INJECTION, SOLUTION INTRAVENOUS at 16:21

## 2022-08-03 RX ADMIN — ALBUMIN (HUMAN) 25 G: 12.5 SOLUTION INTRAVENOUS at 05:03

## 2022-08-03 RX ADMIN — SODIUM BICARBONATE 50 MEQ: 84 INJECTION, SOLUTION INTRAVENOUS at 20:31

## 2022-08-03 RX ADMIN — VANCOMYCIN HYDROCHLORIDE 750 MG: 750 INJECTION, POWDER, LYOPHILIZED, FOR SOLUTION INTRAVENOUS at 08:59

## 2022-08-03 RX ADMIN — PIPERACILLIN AND TAZOBACTAM 4.5 G: 4; .5 INJECTION, POWDER, LYOPHILIZED, FOR SOLUTION INTRAVENOUS; PARENTERAL at 11:51

## 2022-08-03 RX ADMIN — MAGNESIUM SULFATE 2 G: 2 INJECTION INTRAVENOUS at 05:43

## 2022-08-03 RX ADMIN — INDOCYANINE GREEN 2 ML: KIT INTRAVENOUS at 17:31

## 2022-08-03 RX ADMIN — HEPARIN SODIUM 5000 UNITS: 5000 INJECTION, SOLUTION INTRAVENOUS; SUBCUTANEOUS at 05:43

## 2022-08-03 RX ADMIN — SODIUM BICARBONATE 50 MEQ: 84 INJECTION, SOLUTION INTRAVENOUS at 18:13

## 2022-08-03 RX ADMIN — CHLORHEXIDINE GLUCONATE 15 ML: 1.2 SOLUTION ORAL at 09:01

## 2022-08-03 RX ADMIN — SODIUM BICARBONATE: 84 INJECTION, SOLUTION INTRAVENOUS at 21:26

## 2022-08-03 RX ADMIN — Medication 16 MCG: at 16:37

## 2022-08-03 RX ADMIN — CLINDAMYCIN PHOSPHATE 900 MG: 900 INJECTION, SOLUTION INTRAVENOUS at 23:53

## 2022-08-03 RX ADMIN — SODIUM BICARBONATE 50 MEQ: 84 INJECTION, SOLUTION INTRAVENOUS at 20:32

## 2022-08-03 RX ADMIN — SODIUM CHLORIDE, SODIUM GLUCONATE, SODIUM ACETATE, POTASSIUM CHLORIDE AND MAGNESIUM CHLORIDE: 526; 502; 368; 37; 30 INJECTION, SOLUTION INTRAVENOUS at 16:14

## 2022-08-03 RX ADMIN — DEXMEDETOMIDINE 0.6 MCG/KG/HR: 200 INJECTION, SOLUTION INTRAVENOUS at 03:20

## 2022-08-03 RX ADMIN — NOREPINEPHRINE BITARTRATE 16 MCG: at 17:59

## 2022-08-03 RX ADMIN — HEPARIN SODIUM 5000 UNITS: 5000 INJECTION, SOLUTION INTRAVENOUS; SUBCUTANEOUS at 22:22

## 2022-08-03 RX ADMIN — ALBUMIN (HUMAN) 12.5 G: 12.5 SOLUTION INTRAVENOUS at 14:46

## 2022-08-03 RX ADMIN — VANCOMYCIN HYDROCHLORIDE 750 MG: 750 INJECTION, POWDER, LYOPHILIZED, FOR SOLUTION INTRAVENOUS at 19:44

## 2022-08-03 RX ADMIN — FENTANYL CITRATE 100 MCG: 50 INJECTION, SOLUTION INTRAMUSCULAR; INTRAVENOUS at 16:49

## 2022-08-03 RX ADMIN — CHLORHEXIDINE GLUCONATE 15 ML: 1.2 SOLUTION ORAL at 22:22

## 2022-08-03 RX ADMIN — CLINDAMYCIN PHOSPHATE 900 MG: 900 INJECTION, SOLUTION INTRAVENOUS at 08:19

## 2022-08-03 RX ADMIN — METOPROLOL TARTRATE 5 MG: 1 INJECTION, SOLUTION INTRAVENOUS at 04:18

## 2022-08-03 RX ADMIN — SODIUM CHLORIDE, POTASSIUM CHLORIDE, SODIUM LACTATE AND CALCIUM CHLORIDE: 600; 310; 30; 20 INJECTION, SOLUTION INTRAVENOUS at 09:08

## 2022-08-03 ASSESSMENT — ENCOUNTER SYMPTOMS: DYSRHYTHMIAS: 1

## 2022-08-03 ASSESSMENT — PATIENT HEALTH QUESTIONNAIRE - PHQ9: SUM OF ALL RESPONSES TO PHQ9 QUESTIONS 1 & 2: 0

## 2022-08-03 NOTE — PROGRESS NOTES
Vancomycin Dosing by Pharmacy Consult Initiated    Samy Elena is a 89 y.o. male who has been consulted for vancomycin dosing for bacterial skin and skin structure infection prescribed by [employer] .    Reviewed relevant clinical data including weight, renal function, previous vancomycin doses, and vancomycin levels:  Creatinine   Date/Time Value Ref Range Status   08/03/2022 0435 1.1 0.8 - 1.3 mg/dL Final   08/02/2022 2104 1.0 0.8 - 1.3 mg/dL Final   08/02/2022 1628 1.2 0.8 - 1.3 mg/dL Final     No results found for: VANCORANDOM, VANCOTROUGH, VANCOPEAK      Vancomycin Administrations (last 96 hours)       Date/Time Action Medication Dose Rate    08/02/22 1816 New Bag    vancomycin 1.25 gram/250 mL IVPB in NSS 1,250 mg 166.7 mL/hr            Assessment/Plan  The patient is ordered vancomycin dosing by pharmacy.    The dose will be based on:         Wt Readings from Last 1 Encounters:   08/02/22 80.7 kg (177 lb 14.6 oz)         Estimated Creatinine Clearance: 50.4 mL/min by (C-G formula)      Already received 1250 mg IV x 1. Will initiate maintenance dose of 750 mg IV every 12 hours.    Target a trough of 10-15 ug/mL per vancomycin dosing per pharmacy order.  Pharmacy will continue to follow the patient's vancomycin dosing daily.      Please call vancomycin levels to the pharmacy.  Watson Contreras, RickD

## 2022-08-03 NOTE — PROGRESS NOTES
SURGICAL CRITICAL CARE DAILY PROGRESS NOTE     PATIENT NAME:  Samy Elena YOB: 1930    AGE:  89 y.o.  GENDER: male   MRN:  [id number]  PATIENT #: [id number]     SUBJECTIVE   Went to the OR for an EUA, wound debridement for necrotizing soft tissue infection of the perirectal and perineal tissue last night on . Plan to return to OR today.     Remains intubated and sedated in ICU.     Received 1L crystalloid, 500cc Albumin 5% overnight. Now weaning off pressors. Currently Levo 4 with MAPS >65.     ID following. On Vanc, Zosyn, Clinda.      Plan to return to OR today [facility] primary team for second look. Urology is following.      VITAL SIGNS     Temperature:   Temp (24hrs), Av.6 °C (97.9 °F), Min:36.3 °C (97.3 °F), Max:37.1 °C (98.7 °F)       Last 24 hours:    Temp:  [36.3 °C (97.3 °F)-37.1 °C (98.7 °F)] 36.7 °C (98.1 °F)  Heart Rate:  [55-89] 57  Resp:  [18-28] 22  BP: ()/(41-80) 129/59  FiO2 (%) (Set):  [40 %-70 %] 40 %    VENT SETTINGS:   Vent Mode: Pressure regulated volume control  FiO2 (%) (Set):  [40 %-70 %] 40 %  S RR:  [18-20] 20  S VT:  [500 mL] 500 mL  PEEP/CPAP (Set, cmH2O):  [6 cm H20] 6 cm H20  MAP (Observed, cmH2O):  [8-9] 9    INTAKE & OUTPUT     I/O last 3 completed shifts:  In: 5600.1 [I.V.:3840.1; Blood:400; NG/GT:60; IV Piggyback:1300]  Out: 1415 [Urine:935; Emesis/NG output:200; Blood:280]    Intake/Output Summary (Last 24 hours) at 8/3/2022 0835  Last data filed at 8/3/2022 0800  Gross per 24 hour   Intake 5742.22 ml   Output 1515 ml   Net 4227.22 ml     I/O this shift:  In: 142.1 [I.V.:142.1]  Out: 100 [Urine:100]     MEDICATIONS     Infusions:    • dexmedetomidine in 0.9 % NaCl  0.2-1.5 mcg/kg/hr Stopped (22)   • DOBUTamine  5 mcg/kg/min Stopped (22)   • fentaNYL  0-200 mcg/hr 50 mcg/hr (22)   • lactated ringer's   125 mL/hr at 22   • norepinephrine  0.5-90 mcg/min 4 mcg/min (22)   • vasopressin (VASOSTRICT)  "continuous infusion (mixture)  0.03 Units/min Stopped (08/03/22 0045)            Scheduled:   • chlorhexidine  15 mL Mouth/Throat 2 times per day   • clindamycin  900 mg intravenous q8h INT   • heparin (porcine)  5,000 Units subcutaneous q8h IVÁN   • insulin aspart U-100  3-5 Units subcutaneous q6h INT   • metoprolol  5 mg intravenous q6h INT   • pantoprazole  40 mg intravenous q24h   • piperacillin-tazobactam  4.5 g intravenous q6h INT   • vancomycin  750 mg intravenous q12h INT       Antibiotics:  Anti-infectives (From admission, onward)    Start     Dose/Rate Route Frequency Ordered Stop    08/03/22 0800  vancomycin 750 mg/250 mL IVPB in NSS        \"And\" Linked Group Details    750 mg  250 mL/hr over 60 Minutes intravenous Every 12 hours interval 08/03/22 0514      08/03/22 0730  clindamycin (CLEOCIN) in dextrose 5 % 900 mg         900 mg  100 mL/hr over 30 Minutes intravenous Every 8 hours interval 08/03/22 0626      08/03/22 0615  piperacillin-tazobactam (ZOSYN) 4.5 g/100 mL IVPB in NSS         4.5 g  200 mL/hr over 30 Minutes intravenous Every 6 hours interval 08/03/22 0516            PRN:     glucose, 16-32 g of dextrose, PRN   Or  dextrose, 15-30 g of dextrose, PRN   Or  glucagon, 1 mg, PRN   Or  dextrose 50 % in water (D50), 25 mL, PRN  glucose, 16-32 g of dextrose, PRN   Or  dextrose, 15-30 g of dextrose, PRN   Or  glucagon, 1 mg, PRN   Or  dextrose 50 % in water (D50), 25 mL, PRN  fentaNYL, 25 mcg, q5 min PRN  HYDROmorphone, 0.5 mg, q3h PRN  sodium chloride 0.9 %, 5 mL/hr, PRN  sodium chloride 0.9 %, 5 mL/hr, PRN         DIAGNOSTIC DATA     LABS:  Recent Results (from the past 24 hour(s))   Anaerobic Culture / Smear (includes Aerobic Culture)    Collection Time: 08/02/22  3:36 PM    Specimen: Buttock, Left; Abscess Fluid   Result Value Ref Range    Gram Stain Result 3+ WBC     Gram Stain Result 2+ Gram positive cocci     Gram Stain Result 2+ Gram positive bacilli    POCT Glucose    Collection Time: " 08/02/22  4:20 PM   Result Value Ref Range    POCT Bedside Glucose 159 (H) 70 - 99 mg/dL    POC Test POC    Lactate, w/ reflex repeat if > 2.0    Collection Time: 08/02/22  4:28 PM   Result Value Ref Range    Lactate 3.7 (H) 0.4 - 2.0 mmol/L   CBC and differential    Collection Time: 08/02/22  4:28 PM   Result Value Ref Range    WBC 18.70 (H) 3.80 - 10.50 K/uL    RBC 4.05 (L) 4.50 - 5.80 M/uL    Hemoglobin 13.8 13.7 - 17.5 g/dL    Hematocrit 41.4 40.1 - 51.0 %    .2 (H) 83.0 - 98.0 fL    MCH 34.1 (H) 28.0 - 33.2 pg    MCHC 33.3 32.2 - 36.5 g/dL    RDW 14.3 11.6 - 14.4 %    Platelets 173 150 - 350 K/uL    MPV 9.8 9.4 - 12.4 fL    Differential Type Manu     Neutrophils 87 %    Lymphocytes 4 %    Monocytes 6 %    Eosinophils 0 %    Basophils 0 %    Bands 3 <10 %    Neutrophils, Absolute 16.27 (H) 1.70 - 7.00 K/uL    Lymphocytes, Absolute 0.75 (L) 1.20 - 3.50 K/uL    Monocytes, Absolute 1.12 (H) 0.30 - 1.00 K/uL    Eosinophils, Absolute 0.00 (L) 0.04 - 0.54 K/uL    Basophils, Absolute 0.00 (L) 0.01 - 0.10 K/uL    Bands, Absolute 0.56 (H) 0.00 - 0.53 K/uL    PLT Morphology Normal     Platelet Estimate Adequate (150,000-400,000)     Macrocytes 1+     Polychromasia Occasional    Basic metabolic panel    Collection Time: 08/02/22  4:28 PM   Result Value Ref Range    Sodium 134 (L) 136 - 144 mEQ/L    Potassium 3.9 3.6 - 5.1 mEQ/L    Chloride 106 98 - 109 mEQ/L    CO2 17 (L) 22 - 32 mEQ/L    BUN 26 (H) 8 - 20 mg/dL    Creatinine 1.2 0.8 - 1.3 mg/dL    Glucose 173 (H) 70 - 99 mg/dL    Calcium 8.5 (L) 8.9 - 10.3 mg/dL    eGFR 56.9 (L) >=60.0 mL/min/1.73m*2    Anion Gap 11 3 - 15 mEQ/L   Magnesium    Collection Time: 08/02/22  4:28 PM   Result Value Ref Range    Magnesium 2.0 1.8 - 2.5 mg/dL   CK, Total    Collection Time: 08/02/22  4:28 PM   Result Value Ref Range    Total CK 78 16 - 300 U/L   C-reactive protein    Collection Time: 08/02/22  4:28 PM   Result Value Ref Range    .23 (H) <=7.48 mg/L   Sedimentation  rate    Collection Time: 08/02/22  4:28 PM   Result Value Ref Range    Sed Rate 83 (H) 0 - 20 mm/hr   Protime-INR    Collection Time: 08/02/22  4:28 PM   Result Value Ref Range    PT 17.2 (H) 12.2 - 14.5 sec    INR 1.4     Type and screen    Collection Time: 08/02/22  4:28 PM   Result Value Ref Range    Specimen Expiration 08/05/2022     Antibody Screen Negative     ABO O     Rh Factor Positive     History Check Previous type on file    SARS-CoV-2 (COVID-19), PCR Nasopharynx    Collection Time: 08/02/22  4:33 PM    Specimen: Nasopharynx; Nasopharyngeal Swab   Result Value Ref Range    SARS-CoV-2 (COVID-19) Negative Negative   ECG 12 lead    Collection Time: 08/02/22  5:12 PM   Result Value Ref Range    Ventricular rate 68     Atrial rate 441     QRS duration 174     QT Interval 450     QTC Calculation(Bazett) 478     R Axis 64     T Wave Axis 43    Blood Gas, arterial Comprehensive    Collection Time: 08/02/22  6:44 PM   Result Value Ref Range    pH, Arterial 7.47 (H) 7.35 - 7.45    pCO2, Arterial 22 (L) 35 - 48 mm Hg    pO2, Arterial 68 (L) 83 - 100 mm Hg    HCO3, Arterial 20.5 (L) 21.0 - 28.0 mEQ/L    Base Excess, Arterial -5.6 mEQ/L    O2 Sat, Arterial 93 93 - 98 %    TCO2, Arterial 16.7 (L) 22.0 - 32.0 mEQ/L    Lactate, Art 2.0 (H) 0.4 - 1.6 mmol/L    Glucose, Arterial 179 (H) 70 - 99 mg/dL    Sodium, Arterial 133 (L) 136 - 145 mEQ/L    Potassium, Arterial 3.8 3.4 - 4.5 mEQ/L    Chloride, Arterial 105 98 - 109 mEQ/L    Ionized Calcium, Arterial 1.14 (L) 1.15 - 1.27 mmol/L    Hemoglobin, Arterial 13.0 (L) 14.0 - 17.5 g/dL    Carboxyhemoglobin 2.8 0.0 - 3.0; (Smokers: 0.0 - 9.0) %    Methemoglobin 0.3 (L) 0.4 - 1.5 %   Blood Gas, arterial Comprehensive    Collection Time: 08/02/22  7:56 PM   Result Value Ref Range    pH, Arterial 7.24 (LL) 7.35 - 7.45    pCO2, Arterial 45 35 - 48 mm Hg    pO2, Arterial 118 (H) 83 - 100 mm Hg    HCO3, Arterial 18.7 (L) 21.0 - 28.0 mEQ/L    Base Excess, Arterial -8.0 mEQ/L    O2  Sat, Arterial 97 93 - 98 %    TCO2, Arterial 20.7 (L) 22.0 - 32.0 mEQ/L    Lactate, Art 1.6 0.4 - 1.6 mmol/L    Glucose, Arterial 183 (H) 70 - 99 mg/dL    Sodium, Arterial 133 (L) 136 - 145 mEQ/L    Potassium, Arterial 3.9 3.4 - 4.5 mEQ/L    Chloride, Arterial 106 98 - 109 mEQ/L    Ionized Calcium, Arterial 1.42 (H) 1.15 - 1.27 mmol/L    Hemoglobin, Arterial 12.5 (L) 14.0 - 17.5 g/dL    Carboxyhemoglobin 2.0 0.0 - 3.0; (Smokers: 0.0 - 9.0) %    Methemoglobin 0.5 0.4 - 1.5 %   Lactic Acid (Repeat)    Collection Time: 08/02/22  9:04 PM   Result Value Ref Range    Lactate 1.5 0.4 - 2.0 mmol/L   CBC    Collection Time: 08/02/22  9:04 PM   Result Value Ref Range    WBC 17.45 (H) 3.80 - 10.50 K/uL    RBC 3.42 (L) 4.50 - 5.80 M/uL    Hemoglobin 11.8 (L) 13.7 - 17.5 g/dL    Hematocrit 35.1 (L) 40.1 - 51.0 %    .6 (H) 83.0 - 98.0 fL    MCH 34.5 (H) 28.0 - 33.2 pg    MCHC 33.6 32.2 - 36.5 g/dL    RDW 14.5 (H) 11.6 - 14.4 %    Platelets 144 (L) 150 - 350 K/uL    MPV 10.2 9.4 - 12.4 fL   Comprehensive metabolic panel    Collection Time: 08/02/22  9:04 PM   Result Value Ref Range    Sodium 136 136 - 144 mEQ/L    Potassium 3.9 3.6 - 5.1 mEQ/L    Chloride 108 98 - 109 mEQ/L    CO2 19 (L) 22 - 32 mEQ/L    BUN 24 (H) 8 - 20 mg/dL    Creatinine 1.0 0.8 - 1.3 mg/dL    Glucose 174 (H) 70 - 99 mg/dL    Calcium 8.2 (L) 8.9 - 10.3 mg/dL    AST (SGOT) 71 (H) 15 - 41 IU/L    ALT (SGPT) 29 16 - 63 IU/L    Alkaline Phosphatase 98 35 - 126 IU/L    Total Protein 4.7 (L) 6.0 - 8.2 g/dL    Albumin 1.9 (L) 3.4 - 5.0 g/dL    Bilirubin, Total 2.3 (H) 0.3 - 1.2 mg/dL    eGFR >60.0 >=60.0 mL/min/1.73m*2    Anion Gap 9 3 - 15 mEQ/L   Magnesium    Collection Time: 08/02/22  9:04 PM   Result Value Ref Range    Magnesium 2.0 1.8 - 2.5 mg/dL   Phosphorus    Collection Time: 08/02/22  9:04 PM   Result Value Ref Range    Phosphorus 3.9 2.4 - 4.7 mg/dL   Protime-INR    Collection Time: 08/02/22  9:04 PM   Result Value Ref Range    PT 19.5 (H) 12.2 -  14.5 sec    INR 1.7     Blood Gas, arterial Comprehensive    Collection Time: 08/02/22  9:04 PM   Result Value Ref Range    pH, Arterial 7.28 (L) 7.35 - 7.45    pCO2, Arterial 41 35 - 48 mm Hg    pO2, Arterial 116 (H) 83 - 100 mm Hg    HCO3, Arterial 19.4 (L) 21.0 - 28.0 mEQ/L    Base Excess, Arterial -7.1 mEQ/L    O2 Sat, Arterial 97 93 - 98 %    TCO2, Arterial 20.6 (L) 22.0 - 32.0 mEQ/L    Lactate, Art 1.7 (H) 0.4 - 1.6 mmol/L    Glucose, Arterial 163 (H) 70 - 99 mg/dL    Sodium, Arterial 134 (L) 136 - 145 mEQ/L    Potassium, Arterial 4.8 (H) 3.4 - 4.5 mEQ/L    Chloride, Arterial 107 98 - 109 mEQ/L    Ionized Calcium, Arterial 1.19 1.15 - 1.27 mmol/L    Hemoglobin, Arterial 11.9 (L) 14.0 - 17.5 g/dL    Carboxyhemoglobin 2.0 0.0 - 3.0; (Smokers: 0.0 - 9.0) %    Methemoglobin 0.0 (L) 0.4 - 1.5 %    Source Of Oxygen prvc 70%/500/18/6     FIO2 70%    MRSA Screen, Nares Only Nose    Collection Time: 08/02/22 10:48 PM    Specimen: Nose; Nasal Swab   Result Value Ref Range    MRSA DNA, Nares Negative Negative   Blood Gas, arterial Comprehensive    Collection Time: 08/03/22 12:33 AM   Result Value Ref Range    pH, Arterial 7.39 7.35 - 7.45    pCO2, Arterial 27 (L) 35 - 48 mm Hg    pO2, Arterial 133 (H) 83 - 100 mm Hg    HCO3, Arterial 19.4 (L) 21.0 - 28.0 mEQ/L    Base Excess, Arterial -7.1 mEQ/L    O2 Sat, Arterial 97 93 - 98 %    TCO2, Arterial 17.1 (L) 22.0 - 32.0 mEQ/L    Lactate, Art 2.0 (H) 0.4 - 1.6 mmol/L    Glucose, Arterial 201 (H) 70 - 99 mg/dL    Sodium, Arterial 132 (L) 136 - 145 mEQ/L    Potassium, Arterial 4.9 (H) 3.4 - 4.5 mEQ/L    Chloride, Arterial 107 98 - 109 mEQ/L    Ionized Calcium, Arterial 1.14 (L) 1.15 - 1.27 mmol/L    Hemoglobin, Arterial 12.5 (L) 14.0 - 17.5 g/dL    Carboxyhemoglobin 2.2 0.0 - 3.0; (Smokers: 0.0 - 9.0) %    Methemoglobin 0.3 (L) 0.4 - 1.5 %    Source Of Oxygen prvc 40%/6/20/500     FIO2 40    Prepare frozen plasma-(BLOOD BANK ORDER for PRODUCT): 1 Units Transfusion indications:  PTT > 1.5 times normal with bleeding or planned procedure    Collection Time: 08/03/22 12:45 AM   Result Value Ref Range    Product Code K1020P91     Unit ID [id number]     Unit ABO O     Unit RH Positive     Product Status IS     Unit Expiration Date/Time 202208082359     ISBT Code 5100    CBC    Collection Time: 08/03/22  4:35 AM   Result Value Ref Range    WBC 18.19 (H) 3.80 - 10.50 K/uL    RBC 3.54 (L) 4.50 - 5.80 M/uL    Hemoglobin 12.1 (L) 13.7 - 17.5 g/dL    Hematocrit 36.2 (L) 40.1 - 51.0 %    .3 (H) 83.0 - 98.0 fL    MCH 34.2 (H) 28.0 - 33.2 pg    MCHC 33.4 32.2 - 36.5 g/dL    RDW 14.3 11.6 - 14.4 %    Platelets 145 (L) 150 - 350 K/uL    MPV 9.8 9.4 - 12.4 fL   Comprehensive metabolic panel    Collection Time: 08/03/22  4:35 AM   Result Value Ref Range    Sodium 135 (L) 136 - 144 mEQ/L    Potassium 4.3 3.6 - 5.1 mEQ/L    Chloride 110 (H) 98 - 109 mEQ/L    CO2 18 (L) 22 - 32 mEQ/L    BUN 28 (H) 8 - 20 mg/dL    Creatinine 1.1 0.8 - 1.3 mg/dL    Glucose 182 (H) 70 - 99 mg/dL    Calcium 8.3 (L) 8.9 - 10.3 mg/dL    AST (SGOT) 72 (H) 15 - 41 IU/L    ALT (SGPT) 32 16 - 63 IU/L    Alkaline Phosphatase 101 35 - 126 IU/L    Total Protein 5.1 (L) 6.0 - 8.2 g/dL    Albumin 2.3 (L) 3.4 - 5.0 g/dL    Bilirubin, Total 2.6 (H) 0.3 - 1.2 mg/dL    eGFR >60.0 >=60.0 mL/min/1.73m*2    Anion Gap 7 3 - 15 mEQ/L   Magnesium    Collection Time: 08/03/22  4:35 AM   Result Value Ref Range    Magnesium 1.9 1.8 - 2.5 mg/dL   Phosphorus    Collection Time: 08/03/22  4:35 AM   Result Value Ref Range    Phosphorus 4.1 2.4 - 4.7 mg/dL   Protime-INR    Collection Time: 08/03/22  4:35 AM   Result Value Ref Range    PT 19.1 (H) 12.2 - 14.5 sec    INR 1.6     Blood Gas, arterial Comprehensive    Collection Time: 08/03/22  4:36 AM   Result Value Ref Range    pH, Arterial 7.40 7.35 - 7.45    pCO2, Arterial 29 (L) 35 - 48 mm Hg    pO2, Arterial 178 (H) 83 - 100 mm Hg    HCO3, Arterial 20.6 (L) 21.0 - 28.0 mEQ/L    Base Excess,  Arterial -5.5 mEQ/L    O2 Sat, Arterial 97 93 - 98 %    TCO2, Arterial 18.9 (L) 22.0 - 32.0 mEQ/L    Lactate, Art 2.1 (H) 0.4 - 1.6 mmol/L    Glucose, Arterial 191 (H) 70 - 99 mg/dL    Sodium, Arterial 133 (L) 136 - 145 mEQ/L    Potassium, Arterial 4.3 3.4 - 4.5 mEQ/L    Chloride, Arterial 106 98 - 109 mEQ/L    Ionized Calcium, Arterial 1.21 1.15 - 1.27 mmol/L    Hemoglobin, Arterial 12.9 (L) 14.0 - 17.5 g/dL    Carboxyhemoglobin 2.2 0.0 - 3.0; (Smokers: 0.0 - 9.0) %    Methemoglobin 0.6 0.4 - 1.5 %    Source Of Oxygen prvc 40%/6/20/500     FIO2 40%    Prepare frozen plasma-(BLOOD BANK ORDER for PRODUCT): 1 Units Transfusion indications: PTT > 1.5 times normal with bleeding or planned procedure    Collection Time: 08/03/22  5:12 AM   Result Value Ref Range    Product Code Q0923L73     Unit ID [id number]     Unit ABO O     Unit RH Positive     Product Status XM     Unit Expiration Date/Time 333836651550     ISBT Code 5100    Lactic Acid (Repeat)    Collection Time: 08/03/22  7:47 AM   Result Value Ref Range    Lactate 2.0 0.4 - 2.0 mmol/L     Serum creatinine: 1.1 mg/dL 08/03/22 0435  Estimated creatinine clearance: 50.4 mL/min  Microbiology Results     Procedure Component Value Units Date/Time    MRSA Screen, Nares Only Nose [[id number]]  (Normal) Collected: 08/02/22 2248    Specimen: Nasal Swab from Nose Updated: 08/03/22 0030     MRSA DNA, Nares Negative    SARS-CoV-2 (COVID-19), PCR Nasopharynx [[id number]]  (Normal) Collected: 08/02/22 1633    Specimen: Nasopharyngeal Swab from Nasopharynx Updated: 08/02/22 1807    Narrative:      The following orders were created for panel order SARS-CoV-2 (COVID-19), PCR Nasopharynx.  Procedure                               Abnormality         Status                     ---------                               -----------         ------                     SARS-CoV-2 (COVID-19), P...[[id number]]  Normal              Final result                 Please view results for these  tests on the individual orders.    SARS-CoV-2 (COVID-19), PCR Nasopharynx [[id number]]  (Normal) Collected: 08/02/22 1633    Specimen: Nasopharyngeal Swab from Nasopharynx Updated: 08/02/22 1807     SARS-CoV-2 (COVID-19) Negative    Narrative:      Testing performed using real-time PCR for detection of COVID-19. EUA approved validation studies performed on site.     Anaerobic Culture / Smear (includes Aerobic Culture) [[id number]] Collected: 08/02/22 1536    Specimen: Abscess Fluid from Buttock, Left Updated: 08/02/22 2247     Gram Stain Result 3+ WBC      2+ Gram positive cocci      2+ Gram positive bacilli    SARS-CoV-2 (COVID-19), PCR Nasopharynx [[id number]]  (Normal) Collected: 07/25/22 1341    Specimen: Nasopharyngeal Swab from Nasopharynx Updated: 07/25/22 1525    Narrative:      The following orders were created for panel order SARS-CoV-2 (COVID-19), PCR Nasopharynx.  Procedure                               Abnormality         Status                     ---------                               -----------         ------                     SARS-CoV-2 (COVID-19), P...[[id number]]  Normal              Final result                 Please view results for these tests on the individual orders.    SARS-CoV-2 (COVID-19), PCR Nasopharynx [[id number]]  (Normal) Collected: 07/25/22 1341    Specimen: Nasopharyngeal Swab from Nasopharynx Updated: 07/25/22 1525     SARS-CoV-2 (COVID-19) Negative    SARS-CoV-2 (COVID-19), PCR Nasopharynx [[id number]]  (Normal) Collected: 07/22/22 2001    Specimen: Nasopharyngeal Swab from Nasopharynx Updated: 07/22/22 2125    Narrative:      The following orders were created for panel order SARS-CoV-2 (COVID-19), PCR Nasopharynx.  Procedure                               Abnormality         Status                     ---------                               -----------         ------                     SARS-CoV-2 (COVID-19), P...[[id number]]  Normal              Final result                 Please  view results for these tests on the individual orders.    SARS-CoV-2 (COVID-19), PCR Nasopharynx [[id number]]  (Normal) Collected: 07/22/22 2001    Specimen: Nasopharyngeal Swab from Nasopharynx Updated: 07/22/22 2125     SARS-CoV-2 (COVID-19) Negative    Narrative:      Testing performed using real-time PCR for detection of COVID-19. EUA approved validation studies performed on site.           IMAGING:  No results found.    Radiology Imaging    XR CHEST 1 VW    Narrative  CLINICAL HISTORY: Ataxia.    --    Impression  Findings suspicious for congestive heart failure.    COMMENT: AP radiograph of the chest was obtained.  We have no prior similar  studies for comparison.  There is cardiomegaly.  There is vascular  congestion/interstitial edema.  There are bilateral effusions.  Findings suggest  congestive heart failure.  Sternal sutures are seen.  There is no pneumothorax.  We have no prior similar studies for comparison.       Lines, Drains, Airways, Wounds:     CVC Triple Lumen 08/02/22 Internal jugular (Active)   Number of days: 1       Peripheral IV (Adult) 07/22/22 Left Forearm (Active)   Number of days: 12       Peripheral IV (Adult) 08/02/22 Right Forearm (Active)   Number of days: 1       Peripheral IV (Adult) 08/02/22 Anterior;Left;Proximal Forearm (Active)   Number of days: 1       NG/OG Tube 08/02/22 Center mouth (Active)   Number of days: 1       Urethral Catheter Temperature probe 16 Fr (Active)   Number of days: 1       ETT  (Active)   Number of days: 1       Arterial Line 08/02/22 Left Radial (Active)   Number of days: 1       Surgical Incision Buttock (Active)   Number of days: 1       PHYSICAL EXAM     General appearance: Intubated and sedated.   Head: normocephalic, without obvious abnormality, atraumatic. ETT in place.   Eyes: negative, no scleral icterus.   Nose: no discharge  Neck: no JVD, symmetrical, trachea midline.    RIJ CVC in place.   Lungs: Mechanical ventilation.   Chest wall: No  deformities or palpable masses  Heart: Regular rate and rhythm.   Abdomen: soft, non-distended.   Genitalia: edematous, erythematous. No streaking or crepitous.    Incisions clean, dry, intact. Erythema stable.   Extremities: extremities warm and well-perfused.   Right A-Line in place. Good waveform.   Pulses: 2+ and symmetric.  Skin: Skin color, texture, turgor normal.  Neurologic: Unable to assess secondary to sedation.       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 male HD#1 s/p EUA, wound debridement for necrotizing soft tissue infection of the perirectal and perineal tissue on . 1 Day Post-Op    NEURO: Intubated & sedated. On Precedex, Fentanyl.     CV: aFib. HR 50-60s. 4 of Levo. MAPS >65. Metop 5mg Q6.    PULM: ETT. 20/500/40/6. AB.4/29/178/20.6    GI:  NPO. PPI.     :   Fc. Cr 1.1. IVF@ 125cc/hr. UOP of 50-80cc/hr.   Genitalia erythematous. No streaking or apparent spread of infection.     HEME: HgB of 12.1. 1u FFP. INR of  1.6.    ID: WBC: 18.2. NSTI. Vanc, Zosyn, Clinda. Cx w/ 2+ GPCs, 2+ GPBs. BC pending.     ENDO: SSI. BGs btwn 100-200.     DVT PPX: SCDs & sq heparin TID    GI PPX:  PPI    DISPOSITION:  SICU. Return to OR today.     Please page [phone] or [phone] with any questions or concerns.  Connor Evans MD  8/3/2022  8:35 AM

## 2022-08-03 NOTE — BRIEF OP NOTE
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     Estimated Blood Loss: 60 mL    Specimens:                Order Name Source Comment Collection Info Order Time   BLOOD GAS, ARTERIAL COMPREHENSIVE Blood, Arterial  Collected By: Shilpi Powell MD 8/3/2022  5:51 PM     Release to patient   Immediate        BLOOD GAS, ARTERIAL COMPREHENSIVE Blood, Arterial Mixed venous Collected By: Mat Bryant MD 8/3/2022  6:06 PM     Release to patient   Immediate        PREPARE RBC  Pre-op for Procedure  8/3/2022  4:27 PM     Transfusion indications   Pre-OP major surgery with bleeding risk          Has consent been obtained?   Yes          Are you aware of this patient having previously identified antibodies?   NO          Does this Patient have Sickle Cell Disease/Sickle Cell Anemia?   NO              Drains:   NG/OG Tube 08/02/22 Center mouth (Active)   Advancement advanced manually 08/02/22 2115   Adjusted Tube Position (cm) 67 cm 08/03/22 0800   Placement Check Methods external tube length measured 08/03/22 0800   Tolerance no adverse signs/symptoms 08/03/22 0800   Securement taped to ETT 08/03/22 0800   Insertion Site Appearance no warmth;no redness;no tenderness;no skin breakdown;no drainage 08/02/22 2115   Suction Setting/Drainage  Method low suction;continuous setting 08/03/22 1000   Flush/Irrigation flushed with;water 08/03/22 1000   Enteral Flush Intake (mL) 30 mL 08/03/22 1000   Output (mL) 100 mL 08/03/22 1600       Colostomy Other (comment) LUQ (Active)       Urethral Catheter Temperature probe 16 Fr (Active)   Reason for Continuing Urinary Catheterization Continued need for accurate measurement of urinary output in critically ill patient (critical care only) 08/03/22 0800   Urine Characteristics yellow 08/03/22 0800   Securement Method Securing Device 08/03/22 0800   Output (mL) 30 mL 08/03/22 1600   Net Urine Output (mL) 30 mL 08/03/22 1600       Implants: * No implants in log *           Complications:  None; patient tolerated the procedure well.           Disposition: PACU - hemodynamically stable.           Condition: stable    Mat Bryant MD  Phone Number: [phone]

## 2022-08-03 NOTE — CONSULTS
Wound Ostomy Continence Note    Subjective    HPI Patient is a 89 y.o. 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   • Necrotizing soft tissue infection      PMH/PSH Medical History:   Past Medical History:   Diagnosis Date   • Atrial fibrillation (CMS/HCC)    • Disease of thyroid gland    • GI (gastrointestinal bleed)    • Hypertension    • Myocardial infarction (CMS/HCC)      Surgical History:   Past Surgical History:   Procedure Laterality Date   • BYPASS GRAFT        Assessment and                 Recommendation C/s for sigmoid colostpmy or transverse colostomy marking;  --on the vent, sedated, able to bring HOB up at highest level, follow the principle, marked ideal spot for possible sigmoid colostomy, all avoidable area marked as well for possible transverse colosotmy; it seems pt has implant devices at B/L lateral upper abdomen which marked as avoided areas;  --on the vent, BMI 23, Rip score 12-13; s/p necrotizing fasciitis at buttock, remains very high risk for Pis; all PI prevent bundle in place;    1. Maintain PI prevent bundle with low air loss TCS, air cushion and waffle boots( all in place)   2. WOCN barrera follow up at post-op for ostomy as needed    Plan of care discussed with nursing at bedside;             Date: 08/03/22  Signature: Madonna Park RN

## 2022-08-03 NOTE — ANESTHESIOLOGIST PRE-PROCEDURE ATTESTATION
Pre-Procedure Patient Identification:  I am the Primary Anesthesiologist and have identified the patient on 08/03/22 [id number]  I have confirmed the procedure(s) will be performed by the following surgeon/proceduralist Mat Bryant MD.

## 2022-08-03 NOTE — PROGRESS NOTES
General Surgery Post Op Note    Subjective   Mr. Elena is sedated and intubated in the ICU. No obvious bleeding underneath him. His vaso is off, he is on levo at 10 and on dobutamine at 5. Precedex .6. Was given lopressor 5mg. Vital signs are stable. Patient was given 1L bolus for low urine output. He is getting 1 unit FFP for INR 1.7, PT 19.5 . Patient is pre-oped to return to the OR tomorrow. Orogastric tube was 58, pushed in further to 67cm, KUB ordered to confirm placement.    Objective   CBC Results       08/02/22 08/02/22 08/01/22     2104 1628 0820    WBC 17.45 18.70 15.55    RBC 3.42 4.05 3.95    HGB 11.8 13.8 13.9    HCT 35.1 41.4 41.2    .6 102.2 104.3    MCH 34.5 34.1 35.2    MCHC 33.6 33.3 33.7     173 183        BMP Results       08/02/22 08/02/22 08/01/22     2104 1628 0820     134 135    K 3.9 3.9 3.7    Cl 108 106 102    CO2 19 17 21    Glucose 174 173 140    BUN 24 26 22    Creatinine 1.0 1.2 1.2    Calcium 8.2 8.5 8.5    Anion Gap 9 11 12    EGFR >60.0 56.9 56.9         Comment for K at 1628 on 08/02/22: Results obtained on plasma. Plasma Potassium values may be up to 0.4 mEQ/L less than serum values. The differences may be greater for patients with high platelet or white cell counts.          • calcium gluconate  1 g intravenous Once   • chlorhexidine  15 mL Mouth/Throat 2 times per day   • insulin aspart U-100  3-5 Units subcutaneous TID with meals   • metoprolol  5 mg intravenous q6h INT   • pantoprazole  40 mg intravenous q24h       Head; normocephalic, atraumatic. SANDRA.   Chest: equal chest rise bilaterally  Neuro: patient intubated and sedated on pressors    Assessment   This is a 89 y.o. 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   -orogastric tube at 67cm  - levo, dobutamine,  precedex  -NS @ 125ml      Please page [phone] with any questions or concerns

## 2022-08-03 NOTE — ANESTHESIA PREPROCEDURE EVALUATION
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 Plan to return to OR today.      Remains intubated and sedated in ICU.      Received 1L crystalloid, 500cc Albumin 5% overnight. Now weaning off pressors. Currently Levo 4 with MAPS >65.      ID following. On Vanc, Zosyn, Clinda.       Plan to return to OR today [facility] primary team for second look. Urology is following.          8/3/22 TTE    · Normal-sized LV. Mildly decreased LV systolic function. Estimated EF 40- 45%. Markedly abnormal LV septal wall motion. Normal LV wall thickness. Unable to assess diastolic filling pattern of the LV.  · Severely dilated RV. Severely reduced RV systolic function.  · Mildly dilated LA.  · Severely dilated RA. Catheter present in the RA.  · Aorta not well visualized. Aortic root sclerotic.  · Sclerotic aortic valve leaflets. Trace aortic valve regurgitation.  · Sclerotic mitral valve. Trace mitral valve regurgitation.  · Pulmonic valve not well visualized.  · Dilated tricuspid annulus with leaflet failure to coapt. Large gap visualized, approximately 1 cm. Severe tricuspid valve regurgitation. Vena contracta 1.0cm. Unable to estimate RVSP in the setting of severe tricuspid regurgitation and single-chamber physiology. Reversed hepatic vein systolic flow.  · IVC is dilated (>2.1cm). IVC  collapses <50% during inspiration.  · No evidence of pericardial effusion.  · No prior study available for comparison.        Past Surgical History:   Procedure Laterality Date   • BYPASS GRAFT         Physical Exam    Cardiovascular    Rhythm: irregular   Rate: normalPulmonary - normal   clear to auscultation  Dental - normal    Anesthesia  Exam Comments:   Exam Airway: Intubated          Anesthesia Plan    Plan: general    Technique: general endotracheal     Lines and Monitors: additional IV, arterial line, PIV and central line     Airway: oral intubation     instructed to abstain from smoking on day of procedure    Patient did not smoke on day of surgery  ASA 4  Blood Products:     Use of Blood Products Discussed: Yes     Consented to blood products  Anesthetic plan and risks discussed with: adult children  Postop Plan:   Patient Disposition: ICU planned admission   Pain Management: IV analgesics

## 2022-08-03 NOTE — CONSULTS
Urology Consult    Subjective     Samy Elena is a 89 y.o. 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 Patient was seen in consultation at the request of referring physician for management recommendations.     Patient is a 89 yo male with pmh of afib, HTN, ischemic cardiomyopathy who presented to the ED yesterday following a visit to Dr. Bryant's office. He was found to have a necrotizing soft tissue infection of the perirectal region that underwent I&D in the clinic followed by transfer to the hospital and emergent debridement in the OR yesterday. Urology consulted for possible involvement of the  external genitalia.  Intraoperatively the necrotizing infection involve the left buttock, ischial anal space and tracked anteriorly into the perineum and base of scrotum without fulminant involvement of the urethra or testicles.  Urology was available for intra operative evaluation, however was not needed.    Post operatively, he was left intubated and transferred to the ICU on multiple pressors. He is on broad spectrum antibiotics (zosyn, clindamycin, vancomycin). He is planned for return to the operating room with Dr. Bryant today for a second look and further debridement.    In the ER, his son helped to provide medical history.  Patient was known to Dr. Helton for BPH, not seen in many years.  Patient admitted to some difficulty initiating his urinary stream, but denied fevers, chills, dysuria.    Medical History:   Past Medical History:   Diagnosis Date   • Atrial fibrillation (CMS/HCC)    • Disease of thyroid gland    • GI (gastrointestinal bleed)    • Hypertension    • Myocardial infarction (CMS/HCC)        Surgical History:   Past Surgical History:   Procedure Laterality Date   • BYPASS GRAFT         Social History:   Social History     Social History Narrative   • Not on file       Family History:   History reviewed. No pertinent family history.    Allergies:   Patient has no known  allergies.    Home Medications:  •  acetaminophen, Take by mouth.  •  bisacodyL, Insert into the rectum.  •  clopidogreL, Take 75 mg by mouth daily.  •  coenzyme Q10, Take 100 mg by mouth daily.  •  empagliflozin, Take 10 mg by mouth daily.  •  levothyroxine, Take 100 mcg by mouth daily.  •  metoprolol succinate XL, Take 100 mg by mouth daily.  •  multivitamin, Take by mouth daily.  •  ENTRESTO, Take 1 tablet by mouth 2 (two) times a day.  •  sildenafiL (pulm.hypertension), Take 20 mg by mouth 3 (three) times a day.  •  simvastatin, Take 10 mg by mouth nightly.  •  spironolactone, Take 25 mg by mouth daily.  •  torsemide, Take 10-20 mg by mouth daily. 10 mg if weight is 176-181 lbs, 20 mg if weight is >181 lbs    Current Medications:  •  chlorhexidine, 15 mL, Mouth/Throat, 2 times per day  •  clindamycin, 900 mg, intravenous, q8h INT  •  dexmedetomidine in 0.9 % NaCl, 0.2-1.5 mcg/kg/hr, intravenous, Titrated  •  glucose, 16-32 g of dextrose, oral, PRN **OR** dextrose, 15-30 g of dextrose, oral, PRN **OR** glucagon, 1 mg, intramuscular, PRN **OR** dextrose 50 % in water (D50), 25 mL, intravenous, PRN  •  glucose, 16-32 g of dextrose, oral, PRN **OR** dextrose, 15-30 g of dextrose, oral, PRN **OR** glucagon, 1 mg, intramuscular, PRN **OR** dextrose 50 % in water (D50), 25 mL, intravenous, PRN  •  DOBUTamine, 5 mcg/kg/min, intravenous, Continuous  •  fentaNYL, 0-200 mcg/hr, intravenous, Titrated **AND** fentaNYL, 25 mcg, intravenous, q5 min PRN  •  heparin (porcine), 5,000 Units, subcutaneous, q8h IVÁN  •  HYDROmorphone, 0.5 mg, intravenous, q3h PRN  •  insulin aspart U-100, 3-5 Units, subcutaneous, TID with meals  •  lactated ringer's, , intravenous, Continuous  •  magnesium sulfate, 2 g, intravenous, Once  •  metoprolol, 5 mg, intravenous, q6h INT  •  norepinephrine, 0.5-90 mcg/min, intravenous, Titrated  •  pantoprazole, 40 mg, intravenous, q24h  •  piperacillin-tazobactam, 4.5 g, intravenous, q6h INT  •  sodium  "chloride 0.9 %, 5 mL/hr, intravenous, PRN  •  sodium chloride 0.9 %, 5 mL/hr, intravenous, PRN  •  vancomycin, 750 mg, intravenous, q12h INT **AND** [] IV Vancomycin Therapy by Pharmacy Protocol, , , Once  •  vasopressin (VASOSTRICT) continuous infusion (mixture), 0.03 Units/min, intravenous, Continuous    Review of Systems:  Unable to obtain due to patient factors      Objective     Physicial Exam  Visit Vitals  BP (!) 129/59   Pulse 71   Temp 36.7 °C (98.1 °F) (Bladder)   Resp (!) 25   Ht 1.854 m (6' 1\")   Wt 80.7 kg (177 lb 14.6 oz)   SpO2 100%   BMI 23.47 kg/m²     General appearance: intubated, sedated  Head: normocephalic, atraumatic  Eyes: lids and lashes normal, sclerae white  Nose: no discharge  Throat: lips normal without lesions, ETT and OGT in place  Neck: supple, symmetrical, trachea midline  Lungs: On PRVC, symmetric chest expansion  Heart: regular rate   Abdomen: soft, non-tender to palpation, non-distended  Genitalia: Severe penoscrotal dependent edema, uncircumcised penis, foreskin reduced, no crepitus or fluctuance noted throughout.  Bilaterally descended testicles.  Posterior perineum with packing in place.  16 Turkish Will catheter draining dark yellow urine  Extremities: no edema  Skin: no rashes or ulcers  Lymph nodes: no inguinal adenopathy  Neurologic: sedated     Labs  Results from last 7 days   Lab Units 22  1628   WBC K/uL 18.19* 17.45* 18.70*   HEMOGLOBIN g/dL 12.1* 11.8* 13.8   HEMATOCRIT % 36.2* 35.1* 41.4   PLATELETS K/uL 145* 144* 173     Results from last 7 days   Lab Units 22  1628   SODIUM mEQ/L 135* 136 134*   POTASSIUM mEQ/L 4.3 3.9 3.9   CHLORIDE mEQ/L 110* 108 106   CO2 mEQ/L 18* 19* 17*   BUN mg/dL 28* 24* 26*   CREATININE mg/dL 1.1 1.0 1.2   GLUCOSE mg/dL 182* 174* 173*   CALCIUM mg/dL 8.3* 8.2* 8.5*       UA Results       22     1234    Color Yellow    Clarity Clear    Glucose >=1000    " Bilirubin Negative    Ketones Negative    Sp Grav 1.028    Blood Trace    Ph 5.5    Protein Trace    Urobilinogen 2.0    Nitrite Negative    Leuk Est Negative    WBC 0 TO 3    RBC 0 TO 4    Bacteria Rare         Comment for Blood at 1234 on 07/25/22: The sensitivity of the occult blood test is equivalent to approximately 4 intact RBC/HPF.    Comment for Protein at 1234 on 07/25/22: Trace False Positive Protein can be seen with alkaline or highly buffered urines or urine with high specific gravity. Suggest clinical correlation.    Comment for Leuk Est at 1234 on 07/25/22: Results can be falsely negative due to high specific gravity, some antibiotics, glucose >3 g/dl, or WBC other than neutrophils.        Microbiology Results     Procedure Component Value Units Date/Time    MRSA Screen, Nares Only Nose [[id number]]  (Normal) Collected: 08/02/22 2248    Specimen: Nasal Swab from Nose Updated: 08/03/22 0030     MRSA DNA, Nares Negative    SARS-CoV-2 (COVID-19), PCR Nasopharynx [[id number]]  (Normal) Collected: 08/02/22 1633    Specimen: Nasopharyngeal Swab from Nasopharynx Updated: 08/02/22 1807    Narrative:      The following orders were created for panel order SARS-CoV-2 (COVID-19), PCR Nasopharynx.  Procedure                               Abnormality         Status                     ---------                               -----------         ------                     SARS-CoV-2 (COVID-19), P...[[id number]]  Normal              Final result                 Please view results for these tests on the individual orders.    SARS-CoV-2 (COVID-19), PCR Nasopharynx [[id number]]  (Normal) Collected: 08/02/22 1633    Specimen: Nasopharyngeal Swab from Nasopharynx Updated: 08/02/22 1807     SARS-CoV-2 (COVID-19) Negative    Narrative:      Testing performed using real-time PCR for detection of COVID-19. EUA approved validation studies performed on site.     Anaerobic Culture / Smear (includes Aerobic Culture) [[id number]]  Collected: 08/02/22 1536    Specimen: Abscess Fluid from Buttock, Left Updated: 08/02/22 2247     Gram Stain Result 3+ WBC      2+ Gram positive cocci      2+ Gram positive bacilli    SARS-CoV-2 (COVID-19), PCR Nasopharynx [[id number]]  (Normal) Collected: 07/25/22 1341    Specimen: Nasopharyngeal Swab from Nasopharynx Updated: 07/25/22 1525    Narrative:      The following orders were created for panel order SARS-CoV-2 (COVID-19), PCR Nasopharynx.  Procedure                               Abnormality         Status                     ---------                               -----------         ------                     SARS-CoV-2 (COVID-19), P...[[id number]]  Normal              Final result                 Please view results for these tests on the individual orders.    SARS-CoV-2 (COVID-19), PCR Nasopharynx [[id number]]  (Normal) Collected: 07/25/22 1341    Specimen: Nasopharyngeal Swab from Nasopharynx Updated: 07/25/22 1525     SARS-CoV-2 (COVID-19) Negative    SARS-CoV-2 (COVID-19), PCR Nasopharynx [[id number]]  (Normal) Collected: 07/22/22 2001    Specimen: Nasopharyngeal Swab from Nasopharynx Updated: 07/22/22 2125    Narrative:      The following orders were created for panel order SARS-CoV-2 (COVID-19), PCR Nasopharynx.  Procedure                               Abnormality         Status                     ---------                               -----------         ------                     SARS-CoV-2 (COVID-19), P...[[id number]]  Normal              Final result                 Please view results for these tests on the individual orders.    SARS-CoV-2 (COVID-19), PCR Nasopharynx [[id number]]  (Normal) Collected: 07/22/22 2001    Specimen: Nasopharyngeal Swab from Nasopharynx Updated: 07/22/22 2125     SARS-CoV-2 (COVID-19) Negative    Narrative:      Testing performed using real-time PCR for detection of COVID-19. EUA approved validation studies performed on site.             Imaging  I have reviewed the  Imaging from the last 24 hrs.      Assessment     89 yo male with pmh of afib, HTN, ischemic cardiomyopathy who presented to the ED yesterday following a visit to Dr. Bryant's office where he was found to have a necrotizing soft tissue infection of the perirectal region that underwent I&D in the clinic followed by formal debridement in the OR on 8/2.  Urology consulted for possible involvement of the  external genitalia.    Plan     - Maintain Will catheter while critically ill  - Elevate scrotum and penis to help with dependent edema.  Exam findings of the penoscrotal area are not consistent with tracking infection.  But will be available if needed for exam under anesthesia in the operating room today  - Rest of care per primary    [facility]  [facility] Pager: [phone]  [facility] Pager: [phone]  Delmi Howell DO, PGY 5

## 2022-08-03 NOTE — ANESTHESIA POSTPROCEDURE EVALUATION
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           Anesthesia Post Evaluation    Pain management: adequate  Patient location during evaluation: ICU  Patient participation: complete - patient participated  Level of consciousness: arousable  Cardiovascular status: acceptable  Airway Patency: adequate  Respiratory status: acceptable, ETT, intubated and ventilator  Hydration status: acceptable  Anesthetic complications: no

## 2022-08-03 NOTE — OP NOTE
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  I saw him in the office and found induration with some slight drainage in the left anterior perianal area and opened up the area under local anesthesia to find significant necrotizing fasciitis tracking towards the scrotum and posteriorly.  He was admitted through the emergency room and now presents for incision, drainage, and debridement.  Urology is on board to assess scrotal involvement.    Findings: Necrotizing soft tissue infection of the left buttock in the ischio anal space and tracking into the perineal body.  The infection tracks to the right side anteriorly but is fairly limited on the right side.  There was tracking into the perineum and the base of the scrotum but no involvement of the urethra or further into the scrotum or penis.  There was extensive necrotic tissue with vascular thrombosis and spreading along the tissue planes.  Skin, subcutaneous tissue, and some muscle was sharply excised.    Procedure Details   Patient was  placed in lithotomy position the operating table after the induction of general anesthesia.  The patient has severe cardiac comorbidities and cardiac anesthesia is involved.  The patient requiring vasopressors for hypotension and arterial line and central venous catheter was performed by anesthesia.    The perianal and scrotal areas were prepped with Betadine and draped sterilely.  The previously made incision was extended anteriorly and posteriorly and the involved area was filleted open.  Using sharp scissors dissection, the necrotic tissue including skin, subcutaneous tissue, and some muscle was debrided sharply with scissors (excision).  Hemostasis was primarily achieved with cautery but some figure-of-eight sutures with 3-0 Vicryl were also used.  We followed the infected tissue into the right side of the ischiorectal space anteriorly.  As mentioned, the necrotic tissue extended into the perineum and base of the scrotum but did not get close to the urethra.    There was a lot of stool in the rectum which was cleaned out using a retractor.  I examined the internal anus and was not able to find any obvious evidence of a fistula.  The anal musculature itself is intact.    Once hemostasis was achieved, the wound was packed with moist gauze and a dressing was applied.  The patient was taken to the intensive care unit in critical condition.    Estimated Blood Loss: No blood loss documented.    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           Disposition: ICU - intubated and critically ill.           Condition: stable    Mat Bryant MD

## 2022-08-03 NOTE — CONSULTS
"Nutrition Assessment    Recommendations  When medically indicated, recommend initiate TF of Nutren 15 at 10ml/hr, advance as medically indicated to goal of 50ml/hr with 3 packs prosource daily. If euvolemic and not on IV fluids, recommend additional 45ml/hr free water flush       Clinical Course: Patient is a 89 y.o. 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     Past Medical History:   Diagnosis Date   • Atrial fibrillation (CMS/HCC)    • Disease of thyroid gland    • GI (gastrointestinal bleed)    • Hypertension    • Myocardial infarction (CMS/HCC)      Past Surgical History:   Procedure Laterality Date   • BYPASS GRAFT         Reason for Assessment  Reason For Assessment: identified at risk by screening criteria (MST)     [facility] Nutrition Screen Tool  Has patient lost weight without trying?: 2-->Unsure  If yes,how much weight has been lost?: 2-->Unsure  Has patient been eating poorly due to decreased appetite?: 0-->No  [facility] Nutrition Screen Score: 4     Physical Findings  Overall Physical Appearance: on ventilator support  Tubes: Orogastric tube  Skin: surgical incision     [id number] Physical Appearance  Overall Physical Appearance: on ventilator support  Tubes: Orogastric tube  Skin: surgical incision     Nutrition Order  Nutrition Order: does not meet nutritional requirements  Nutrition Order Comments: NPO     Anthropometrics  Height: 185.4 cm (6' 1\")     Current Weight  Weight Method: Bed scale  Weight: 80.7 kg (177 lb 14.6 oz)     Ideal Body Weight (IBW)  Ideal Body Weight (IBW) (kg): 84.86  % Ideal Body Weight: 95.1     Body Mass Index (BMI)  BMI (Calculated): 23.5     Labs/Procedures/Meds  Lab Results Reviewed: reviewed, pertinent   BMP Results       08/03/22 08/02/22 08/02/22     0435 2104 1628     136 134    K 4.3 3.9 3.9    Cl 110 108 106    CO2 18 19 17    Glucose 182 174 173    BUN 28 24 26    Creatinine 1.1 1.0 1.2    Calcium 8.3 8.2 8.5     Mg " 1.9, Phos 4.1,     Medications  Pertinent Medications Reviewed: reviewed, pertinent   Scheduled Meds:  • chlorhexidine  15 mL Mouth/Throat 2 times per day   • clindamycin  900 mg intravenous q8h INT   • heparin (porcine)  5,000 Units subcutaneous q8h IVÁN   • insulin aspart U-100  3-5 Units subcutaneous q6h INT   • metoprolol  5 mg intravenous q6h INT   • pantoprazole  40 mg intravenous q24h   • piperacillin-tazobactam  4.5 g intravenous q6h INT   • vancomycin  750 mg intravenous q12h INT     Continuous Infusions:  • dexmedetomidine in 0.9 % NaCl  0.2-1.5 mcg/kg/hr Stopped (08/03/22 0608)   • DOBUTamine  5 mcg/kg/min Stopped (08/03/22 0526)   • fentaNYL  0-200 mcg/hr 50 mcg/hr (08/03/22 0700)   • lactated ringer's   125 mL/hr at 08/03/22 0908   • norepinephrine  0.5-90 mcg/min 6 mcg/min (08/03/22 0845)   • vasopressin (VASOSTRICT) continuous infusion (mixture)  0.03 Units/min Stopped (08/03/22 0045)     Calorie Requirements  Estimated kCal Needs: Actual Body Weight  Estimated Calorie Need Method: kcal/kg  Calorie/kg Recommended: 22-25  Calorie Recommendations: 7013-4113     Protein Requirements  Recommended Dosing Weight (Estimated Protein Needs): Actual Body Weight  Est Protein Requirement Amount (gms/kg):  (1.5-2g/kg)  Protein Recommendations: 120-160g     Fluid requirements  2015-2420ml (25-30ml/kg)     Dosing weight  80.7kg    Clinical comments:  Pt admit with NSTI of perirectal and perineal area s/p 8.2 OR for I+D. For return to OR today. Currently intubated, MAP 64 on levophed, sedated on fentanyl, 40% FIO2, PEEP 6.    When medically indicated, recommend initiation of TF of Nutren 15 at 10ml/hr, advance as medically indicated to goal of 50ml/hr wit h3 packs prosource daily to provide 1980 kcal (25 kcal/kg), 127g protein (1.6g protein/kg) and 917ml free water inherent to formula. If euvolemic and not on IV fluids, recommend additional 45ml/hr free water flush for a total of 1997ml daily (25ml/kg),     If  extubated- regular diet as medically indicated.    Goals: meet 80% of needs via most appropriate form of nutrition   Monitor: plan of care, initiation of nutrition     Recommendations: See above     PES  Statement: PES Statement  Nutrition Diagnosis: Inadequate Energy Intake  Related To:: Decreased ability to consume sufficient energy  As Evidenced By:: intubated/ NPO  Nutritional Needs Met?: No                                   Date: 08/03/22  Signature: Suzanne Norris RD

## 2022-08-03 NOTE — CONSULTS
"General Surgery Consult    Subjective     Samy Elena is a 89 y.o. 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 Patient was seen in consultation at the request of referring physician for management recommendations. 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  Patient initially presented to Dr. Bryant's office earlier today from the [facility] for evaluation of a \"Pilonidal Cyst.\" When he was evaluated in the office by Dr. Bryant had was found to have significant edema around the penis, perineal, and left gluteal/buttock area of 3-4 day duration. An  I&D was performed in the office and significant pus was released. 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  His labs in the ED were notable for a WBC of 18.7, hgb 13.8, Lactate 3.7, Cr. 1.2, glucose 173. He was started on Vanc, Clinda, Zosyn prior to the OR and given 1 L of NS for elevated lactate and hypotension.  He was then rapidly taken to the OR for debridement and EUA    In the OR he had debridement of her left buttock, perineum and thigh. There was noted necrotic tissue, but no connection to the rectum and NO tracking into the groin or down the leg.   During the Operation he had In: 1.1L Crystalloid, Out: 280 EBL, 400UOP. TLD: RIJ TLC, L Radial A-line, FC, ETT, OGT. At the end of the case he was on Levo 4, Vaso 0.08, Dobutamine 5. He was then transferred to the SICU intubated for further critical care management. He has plans to return to the OR for a second look and further debridement with Dr. Bryant tomorrow 8/3/22.      Medical History:   Past Medical History:   Diagnosis Date   • Atrial fibrillation (CMS/HCC)    • Disease of thyroid " gland    • GI (gastrointestinal bleed)    • Hypertension    • Myocardial infarction (CMS/HCC)        Surgical History:   Past Surgical History:   Procedure Laterality Date   • BYPASS GRAFT         Social History:   Social History     Social History Narrative   • Not on file       Family History: History reviewed. No pertinent family history.    Allergies: Patient has no known allergies.    Home Medications:  •  acetaminophen, Take by mouth.  •  bisacodyL, Insert into the rectum.  •  clopidogreL, Take 75 mg by mouth daily.  •  coenzyme Q10, Take 100 mg by mouth daily.  •  empagliflozin, Take 10 mg by mouth daily.  •  levothyroxine, Take 100 mcg by mouth daily.  •  metoprolol succinate XL, Take 100 mg by mouth daily.  •  multivitamin, Take by mouth daily.  •  ENTRESTO, Take 1 tablet by mouth 2 (two) times a day.  •  sildenafiL (pulm.hypertension), Take 20 mg by mouth 3 (three) times a day.  •  simvastatin, Take 10 mg by mouth nightly.  •  spironolactone, Take 25 mg by mouth daily.  •  torsemide, Take 10-20 mg by mouth daily. 10 mg if weight is 176-181 lbs, 20 mg if weight is >181 lbs    Current Medications:  •  glucose, 16-32 g of dextrose, oral, PRN **OR** dextrose, 15-30 g of dextrose, oral, PRN **OR** glucagon, 1 mg, intramuscular, PRN **OR** dextrose 50 % in water (D50), 25 mL, intravenous, PRN  •  DOBUTamine, 5 mcg/kg/min, intravenous, Continuous  •  vasopressin (VASOSTRICT) continuous infusion (mixture), 0.03 Units/min, intravenous, Continuous  •  calcium chloride, , intravenous, PRN  •  dexAMETHasone, , intravenous, PRN  •  dexmedeTOMIDine (PRECEDEX) infusion (100 mL), , intravenous, Continuous PRN  •  electrolyte-A, , intravenous, Continuous PRN  •  etomidate, , intravenous, PRN  •  fentaNYL (PF), , intravenous, PRN  •  lidocaine, , injection, PRN  •  norepinephrine, , intravenous, Continuous PRN  •  propofoL, , intravenous, PRN  •  rocuronium, , intravenous, PRN  •  sodium bicarbonate, , intravenous, PRN  •   "sodium chloride 0.9 %, , intravenous, Continuous PRN  •  sodium chloride 0.9 %, , intravenous, Continuous PRN    Review of Systems  Pertinent items are noted in HPI.    Objective     Physicial Exam  Visit Vitals  BP (!) 93/58   Pulse 64   Temp 37.1 °C (98.7 °F) (Temporal)   Resp (!) 26   Ht 1.854 m (6' 1\")   Wt 80.7 kg (178 lb)   SpO2 96%   BMI 23.48 kg/m²       General appearance: intubated, sedated, ill appearing  Throat: ETT secure to mouth. OGT with bilious drainage.   Neck: Trachea midline, JVD noted bilaterally  Lungs: Intubated, syncing well with vent,   Chest wall: no tenderness  Heart: a fib, rate controlled, on multiple pressors  Abdomen: soft, non-tender; bowel sounds normal; no masses, no organomegaly  Genitalia: penile and scrotal edema. Will with clear yellow urine  Rectal: large open wound to the left of wound from debridement. Large dressing overtop c/d/i.    Extremities: extremities normal, warm and well-perfused; no cyanosis, clubbing, or edema  Pulses: 2+ and symmetric  Neurologic: intubated and sedated      Labs  Post op labs pending    Imaging      Assessment     89 y/o male with CAD, Ischemic cardiomyopathy, afib, HTN, HLD, DM who presented to the office with left buttock pain and concerns for Justin's gangrene taken to the OR by Dr. Bryant on 8/2/22 for EUA, Debridement of soft tissue infection        Plan     N: Keep sedated tonight with Fentanyl and Precedex. Had AMS prior to surgery A&Ox2.     CV: Wean pressors (currently on Levo, vaso, dobutamine).  Will obtain cardiology consult in AM. Post op EKG. On home sildenafil for pulm HTN, Spironolatone, Torsemide, Metoprolol, Entresto. Will continued to give IV lopressor but hold diuretics and sildenafil. Will restart simvastatin when able.    Pulm: on PRVC, Maintain on vent overnight. Post op CXR. F/U post op ABG and titrate for effect.     GI: OGT to low continuous wall suction, PPI QD.  Will consider TPN if >7days of NPO. Will monitor post " op dressing - if significant bleeding contact surgery for evaluation.     : Will catheter, post op labs, q1hr UOP, replete electrolytes as necessary.  LR @ 125. Urology consulted    ID: Vanc/Zosyn/Clindamycin. OR Pahology and Cultures to follow up. ID consult in AM.     Endo: DM - insulin sliding scale. Hold home jardiance    Heme: F/U CBC - hold home plavix. SCDs for DVT ppx and SQH.     TLD: RIJ TLC, L radial a-line, FC, OGT, ETT    [place]   [id number] with questions or concerns.

## 2022-08-03 NOTE — PLAN OF CARE
Problem: Adult Inpatient Plan of Care  Goal: Plan of Care Review  Outcome: Progressing  Flowsheets (Taken 8/3/2022 0613)  Plan of Care Reviewed With: patient  Outcome Summary: received post op, on multiple pressors. son at bedside. plan to go back to OR today.   Plan of Care Review  Plan of Care Reviewed With: patient  Outcome Summary: received post op, on multiple pressors. son at bedside. plan to go back to OR today.

## 2022-08-03 NOTE — CONSULTS
Infectious Disease Consult Note    Patient Name: Samy Elena  MR#: [id number]  : 10/6/1930  Admission Date: 2022  Consult Date: 22 6:10 AM   Consultant: Toni Ramirez MD    Reason for Consult: necrotizing fascitis  Referring Provider: referred by Mat Bryant MD        History of Present Illness     Samy Elena is a 89 y.o. male who was admitted on 2022.  He has a past medical history significant for A. fib not on anticoagulation due to prior GI bleed, hypertension, ischemic cardiomyopathy, recent hospitalization due to changes in his speech.  His work-up was negative for CVA and he was diagnosed with possible postural/orthostatic hypotension.  He was transferred to rehabilitation unit where he developed buttocks and pubic pain for several days.  He was referred to be seen by surgery as outpatient.  He was noted to have indurated left perianal area extending onto perineum.  The area was evaluated under local anesthesia with evidence of necrotizing soft tissue process.  He was sent to ED and admitted to hospital for surgical evaluation.  He was taken to the OR last night where necrotizing soft tissue infection of the left buttocks extending into the ischial anal space and tracking into the perineum was noted.  Process was tracking in today base of the scrotum but no notable involvement of urethra or genitals.  He was started empirically on antibiotic therapy with vancomycin, Zosyn and clindamycin and we were consulted.    This morning, he remains intubated, sedated, on pressors.  We were consulted for evaluation recommendations regarding antibiotic management.  He is scheduled to return to the OR later today.  Of note, he is receiving therapy with Jardiance.  As per my review of his chart, there is no confirmed history of diabetes.  He was an A1c of 6.3 on 2022.    Outside records were reviewed.    Allergies: No Known Allergies    Medical History:   Past Medical History:  "  Diagnosis Date   • Atrial fibrillation (CMS/HCC)    • Disease of thyroid gland    • GI (gastrointestinal bleed)    • Hypertension    • Myocardial infarction (CMS/HCC)        Surgical History:   Past Surgical History:   Procedure Laterality Date   • BYPASS GRAFT         Social History:   Social History     Socioeconomic History   • Marital status:      Spouse name: None   • Number of children: None   • Years of education: None   • Highest education level: None   Tobacco Use   • Smoking status: Never Smoker   • Smokeless tobacco: Never Used     Social Determinants of Health     Food Insecurity: No Food Insecurity   • Worried About Running Out of Food in the Last Year: Never true   • Ran Out of Food in the Last Year: Never true          Travel Exposure:   Travel and Exposure Screening    Flowsheet Row Most Recent Value   Travel Screening    Overnight hospitalization outside the [place] in the last year? No Filed On: 08/02/2022 1731   Exposure Screening    Symptoms           Animal Exposure: None    Other Exposure: Unknown    Family History: History reviewed. No pertinent family history.    Review of Systems    Review of systems not obtained due to patient factors.    Medications:    Current IP Meds (From admission, onward)        Frequency     chlorhexidine (PERIDEX) 0.12 % mouthwash 15 mL  (Ventilator/VAP Bundle)         2 times per day     pantoprazole (PROTONIX) injection 40 mg         Every 24 hours     insulin aspart U-100 (NovoLOG) pen 3-5 Units  (Novolog Insulin Correction Factor for patient weight < 200 lb)         3 times daily with meals     vancomycin 750 mg/250 mL IVPB in NSS  (Vancomycin IV therapy by Pharmacy Protocol)        \"And\" Linked Group Details    Every 12 hours interval     heparin (porcine) 5,000 unit/mL injection 5,000 Units         Every 8 hours     piperacillin-tazobactam (ZOSYN) 4.5 g/100 mL IVPB in NSS  Status:  Discontinued         Every 12 hours interval     piperacillin-tazobactam " "(ZOSYN) 4.5 g/100 mL IVPB in NSS         Every 6 hours interval     clindamycin (CLEOCIN) IVPB in  mg         Every 8 hours interval     magnesium sulfate IVPB 2g in 50 mL NSS/D5W/SWFI         Once     piperacillin-tazobactam (ZOSYN) 3.375 g/100 mL IVPB in NSS  Status:  Discontinued         Every 12 hours interval     vancomycin 1.25 gram/250 mL IVPB in NSS  Status:  Discontinued         Once     clindamycin (CLEOCIN) IVPB in  mg  Status:  Discontinued         Once     albumin human 5 % solution 25 g  (Other)  Status:  Discontinued         Once     albumin human 5 % solution 50 g  (Other)  Status:  Discontinued         Once     albumin human 5 % solution 25 g  (Other)         Once     HYDROmorphone (DILAUDID) injection 0.5 mg         Every 3 hours PRN     sodium chloride 0.9 % infusion         As needed     calcium gluconate 1 gram/50 mL IVPB in NSS 1 g         Once     albumin human 25 % solution 12.5 g  (Other)         Once     sodium chloride 0.9 % infusion         As needed     lactated ringer's bolus 500 mL         Once     norepinephrine (LEVOPHED) 4 mg/250 mL (16 mcg/mL) in 0.9 % NaCl infusion         Titrated     potassium chloride 20 mEq in 100 mL IVPB  (premix)  (potassium chloride IV)         Once     lactated ringer's infusion         Continuous     dexmedeTOMIDine in 0.9 % NaCL (PRECEDEX) 400 mcg/100 mL (4 mcg/mL) infusion  (Sedation Medications for Critical Care Patients)         Titrated     fentaNYL (SUBLIMAZE) 2500 mcg/50 mL (50 mcg/mL) syringe  (Sedation Medications for Critical Care Patients)        \"And\" Linked Group Details    Titrated     metoprolol (LOPRESSOR) injection 5 mg         Every 6 hours interval     lactated ringer's bolus 500 mL         Once     fentaNYL bolus from bag 25 mcg  (Sedation Medications for Critical Care Patients)        \"And\" Linked Group Details    Every 5 min PRN     glucose chewable tablet 16-32 g of dextrose  (Hypoglycemia Treatment Protocol and " "Hyperglycemia Validation Protocol)        \"Or\" Linked Group Details    As needed     dextrose 40 % oral gel 15-30 g of dextrose  (Hypoglycemia Treatment Protocol and Hyperglycemia Validation Protocol)        Note to Pharmacy: Pharmacist: [facility] is the depot location for this medication.   \"Or\" Linked Group Details    As needed     glucagon (GLUCAGEN) injection 1 mg  (Hypoglycemia Treatment Protocol and Hyperglycemia Validation Protocol)        \"Or\" Linked Group Details    As needed     dextrose 50 % in water (D50) injection 12.5 g  (Hypoglycemia Treatment Protocol and Hyperglycemia Validation Protocol)        \"Or\" Linked Group Details    As needed     dexmedeTOMIDine HCl (PRECEDEX) 400 mcg in sodium chloride 0.9 % 100 mL (4 mcg/mL) infusion  Status:  Discontinued         Continuous PRN     calcium chloride 100 mg/mL (10 %) injection  Status:  Discontinued         As needed     sodium bicarbonate 8.4 % (1 mEq/mL) injection  Status:  Discontinued         As needed     fentaNYL (PF) (SUBLIMAZE) injection  Status:  Discontinued         As needed     dexAMETHasone (DECADRON) injection  Status:  Discontinued         As needed     propofoL (DIPRIVAN) 10 mg/mL continuous infusion  Status:  Discontinued         As needed     etomidate (AMIDATE) injection  Status:  Discontinued         As needed     rocuronium (ZEMURON) injection  Status:  Discontinued         As needed     lidocaine (XYLOCAINE) 10 mg/mL (1 %) injection  Status:  Discontinued         As needed     electrolyte-A (PLASMA-LYTE A) infusion  Status:  Discontinued         Continuous PRN     norepinephrine (LEVOPHED) 4 mg/250 mL (16 mcg/mL) in 0.9 % NaCl infusion  Status:  Discontinued         Continuous PRN     sodium chloride 0.9 % infusion  Status:  Discontinued         Continuous PRN     DOBUTamine (DOBUTREX) 500 mg in dextrose 5 % 250 mL (2 mg/mL) infusion        Note to Pharmacy: PLEASE TUBE TO #[phone] - THANKS!    Continuous     vasopressin (VASOSTRICT) 20 Units in " "sodium chloride 0.9 % 100 mL (0.2 Units/mL) infusion        Note to Pharmacy: PLEASE TUBE TO #[phone] - THANKS!    Continuous     sodium chloride 0.9 % infusion  Status:  Discontinued         Continuous PRN     glucose chewable tablet 16-32 g of dextrose  (Hypoglycemia Treatment Protocol and Hyperglycemia Validation Protocol)        \"Or\" Linked Group Details    As needed     dextrose 40 % oral gel 15-30 g of dextrose  (Hypoglycemia Treatment Protocol and Hyperglycemia Validation Protocol)        Note to Pharmacy: Pharmacist: [facility] is the depot location for this medication.   \"Or\" Linked Group Details    As needed     glucagon (GLUCAGEN) injection 1 mg  (Hypoglycemia Treatment Protocol and Hyperglycemia Validation Protocol)        \"Or\" Linked Group Details    As needed     dextrose 50 % in water (D50) injection 12.5 g  (Hypoglycemia Treatment Protocol and Hyperglycemia Validation Protocol)        \"Or\" Linked Group Details    As needed     vancomycin 1.25 gram/250 mL IVPB in NSS         Once     piperacillin-tazobactam (ZOSYN) 3.375 g/100 mL IVPB in NSS         Once     piperacillin-tazobactam (ZOSYN) 3.375 g/100 mL IVPB in NSS  Status:  Discontinued         Every 6 hours interval     clindamycin (CLEOCIN) in dextrose 5 % 900 mg         Once          Anti-infectives (From admission, onward)    Start     Dose/Rate Route Frequency Ordered Stop    08/03/22 0800  vancomycin 750 mg/250 mL IVPB in NSS        \"And\" Linked Group Details    750 mg  250 mL/hr over 60 Minutes intravenous Every 12 hours interval 08/03/22 0514      08/03/22 0615  piperacillin-tazobactam (ZOSYN) 4.5 g/100 mL IVPB in NSS         4.5 g  200 mL/hr over 30 Minutes intravenous Every 6 hours interval 08/03/22 0516      08/03/22 0615  clindamycin (CLEOCIN) IVPB in  mg         900 mg intravenous Every 8 hours interval 08/03/22 0517              Objective     Vital Signs:    Visit Vitals  BP (!) 129/59   Pulse 71   Temp 36.7 °C (98.1 °F) (Bladder)   Resp " "(!) 25   Ht 1.854 m (6' 1\")   Wt 80.7 kg (177 lb 14.6 oz)   SpO2 100%   BMI 23.47 kg/m²     Temp (72hrs), Av.6 °C (97.9 °F), Min:36.3 °C (97.3 °F), Max:37.1 °C (98.7 °F)      Physical Exam:    General: Elderly man, intubated, sedated, pale.  HEENT: NC/AT/ anicteric sclera, pharynx clear, ET tube in place, NG tube in place draining scant bilious material  Neck: supple, no meningismus  Cardiovascular: regular S1/S2, distant sounds, soft systolic murmur.   Respiratory: clear to auscultation anteriorly  GI/Abdomen: Obese, soft, NT/ND,  no peritoneal signs, decreased bowel sounds  Extremities: no clubbing, cyanosis, or edema  JOINTS:  No swelling, erythema, or pain  Lymphatics: no lymphadenopathy  Neurology: Intubated and sedated   psych: Intubated and sedated   skin: There is a large surgical wound in the perineum extending into left ischial area which is currently packed.  The patient was repositioned with the help of nursing and area was assessed.  Borders look clean and no evidence of progressive necrotizing findings noted.    G.U.: Significant edema of external genitalia with crepitus palpable in suprapubic area.  Will in place.  Lines: Right IJ TLC C/D/I.      Lines, Drains, Airways, Wounds:  CVC Triple Lumen 22 Internal jugular (Active)   Number of days: 1       Peripheral IV (Adult) 22 Left Forearm (Active)   Number of days: 12       Peripheral IV (Adult) 22 Right Forearm (Active)   Number of days: 1       Peripheral IV (Adult) 22 Anterior;Left;Proximal Forearm (Active)   Number of days: 1       NG/OG Tube 22 Center mouth (Active)   Number of days: 1       Urethral Catheter Temperature probe 16 Fr (Active)   Number of days: 1       ETT  (Active)   Number of days: 1       Arterial Line 22 Left Radial (Active)   Number of days: 1       Surgical Incision Buttock (Active)   Number of days: 1       Labs:    CBC Results       22     0435 2104 1628    WBC " 18.19 17.45 18.70    RBC 3.54 3.42 4.05    HGB 12.1 11.8 13.8    HCT 36.2 35.1 41.4    .3 102.6 102.2    MCH 34.2 34.5 34.1    MCHC 33.4 33.6 33.3     144 173      BMP Results       08/03/22 08/02/22 08/02/22     0435 2104 1628     136 134    K 4.3 3.9 3.9    Cl 110 108 106    CO2 18 19 17    Glucose 182 174 173    BUN 28 24 26    Creatinine 1.1 1.0 1.2    Calcium 8.3 8.2 8.5    Anion Gap 7 9 11    EGFR >60.0 >60.0 56.9         Comment for K at 0435 on 08/03/22: MODERATE HEMOLYSIS, RESULT MAY BE INCREASED.    Comment for K at 1628 on 08/02/22: Results obtained on plasma. Plasma Potassium values may be up to 0.4 mEQ/L less than serum values. The differences may be greater for patients with high platelet or white cell counts.      PT/PTT Results       08/03/22 08/02/22 08/02/22 0435 2104 1628    PT 19.1 19.5 17.2    INR 1.6 1.7 1.4         Comment for INR at 0435 on 08/03/22: Moderate Intensity Anticoagulation = 2.0 to 3.0, High Intensity = 2.5 to 3.5    Comment for INR at 2104 on 08/02/22: Moderate Intensity Anticoagulation = 2.0 to 3.0, High Intensity = 2.5 to 3.5    Comment for INR at 1628 on 08/02/22: Moderate Intensity Anticoagulation = 2.0 to 3.0, High Intensity = 2.5 to 3.5      UA Results       07/25/22     1234    Color Yellow    Clarity Clear    Glucose >=1000    Bilirubin Negative    Ketones Negative    Sp Grav 1.028    Blood Trace    Ph 5.5    Protein Trace    Urobilinogen 2.0    Nitrite Negative    Leuk Est Negative    WBC 0 TO 3    RBC 0 TO 4    Bacteria Rare         Comment for Blood at 1234 on 07/25/22: The sensitivity of the occult blood test is equivalent to approximately 4 intact RBC/HPF.    Comment for Protein at 1234 on 07/25/22: Trace False Positive Protein can be seen with alkaline or highly buffered urines or urine with high specific gravity. Suggest clinical correlation.    Comment for Leuk Est at 1234 on 07/25/22: Results can be falsely negative due to high specific  gravity, some antibiotics, glucose >3 g/dl, or WBC other than neutrophils.      Lactate Results       08/02/22 08/02/22     2104 1628    Lactate 1.5 3.7          Microbiology Results     Procedure Component Value Units Date/Time    MRSA Screen, Nares Only Nose [[id number]]  (Normal) Collected: 08/02/22 2248    Specimen: Nasal Swab from Nose Updated: 08/03/22 0030     MRSA DNA, Nares Negative    SARS-CoV-2 (COVID-19), PCR Nasopharynx [[id number]]  (Normal) Collected: 08/02/22 1633    Specimen: Nasopharyngeal Swab from Nasopharynx Updated: 08/02/22 1807    Narrative:      The following orders were created for panel order SARS-CoV-2 (COVID-19), PCR Nasopharynx.  Procedure                               Abnormality         Status                     ---------                               -----------         ------                     SARS-CoV-2 (COVID-19), P...[[id number]]  Normal              Final result                 Please view results for these tests on the individual orders.    SARS-CoV-2 (COVID-19), PCR Nasopharynx [[id number]]  (Normal) Collected: 08/02/22 1633    Specimen: Nasopharyngeal Swab from Nasopharynx Updated: 08/02/22 1807     SARS-CoV-2 (COVID-19) Negative    Narrative:      Testing performed using real-time PCR for detection of COVID-19. EUA approved validation studies performed on site.     Anaerobic Culture / Smear (includes Aerobic Culture) [[id number]] Collected: 08/02/22 1536    Specimen: Abscess Fluid from Buttock, Left Updated: 08/02/22 2247     Gram Stain Result 3+ WBC      2+ Gram positive cocci      2+ Gram positive bacilli    SARS-CoV-2 (COVID-19), PCR Nasopharynx [[id number]]  (Normal) Collected: 07/25/22 1341    Specimen: Nasopharyngeal Swab from Nasopharynx Updated: 07/25/22 1525    Narrative:      The following orders were created for panel order SARS-CoV-2 (COVID-19), PCR Nasopharynx.  Procedure                               Abnormality         Status                     ---------                                -----------         ------                     SARS-CoV-2 (COVID-19), P...[[id number]]  Normal              Final result                 Please view results for these tests on the individual orders.    SARS-CoV-2 (COVID-19), PCR Nasopharynx [[id number]]  (Normal) Collected: 07/25/22 1341    Specimen: Nasopharyngeal Swab from Nasopharynx Updated: 07/25/22 1525     SARS-CoV-2 (COVID-19) Negative    SARS-CoV-2 (COVID-19), PCR Nasopharynx [[id number]]  (Normal) Collected: 07/22/22 2001    Specimen: Nasopharyngeal Swab from Nasopharynx Updated: 07/22/22 2125    Narrative:      The following orders were created for panel order SARS-CoV-2 (COVID-19), PCR Nasopharynx.  Procedure                               Abnormality         Status                     ---------                               -----------         ------                     SARS-CoV-2 (COVID-19), P...[[id number]]  Normal              Final result                 Please view results for these tests on the individual orders.    SARS-CoV-2 (COVID-19), PCR Nasopharynx [[id number]]  (Normal) Collected: 07/22/22 2001    Specimen: Nasopharyngeal Swab from Nasopharynx Updated: 07/22/22 2125     SARS-CoV-2 (COVID-19) Negative    Narrative:      Testing performed using real-time PCR for detection of COVID-19. EUA approved validation studies performed on site.           Pathology Results     ** No results found for the last 720 hours. **        Imaging:    Radiology Imaging    XR CHEST 1 VW    Narrative  CLINICAL HISTORY: Ataxia.    --    Impression  Findings suspicious for congestive heart failure.    COMMENT: AP radiograph of the chest was obtained.  We have no prior similar  studies for comparison.  There is cardiomegaly.  There is vascular  congestion/interstitial edema.  There are bilateral effusions.  Findings suggest  congestive heart failure.  Sternal sutures are seen.  There is no pneumothorax.  We have no prior similar studies for  comparison.      Assessment   89 years old male with history of CHF, A. fib not on anticoagulation due to prior history of GI bleed, tricuspid regurgitation, pulmonary hypertension, coronary disease, who was recently discharged from hospital after he presented with slurred speech and hypotension.  Work-up for CVA was negative and it was concluded that he was suffering from orthostatic hypotension.  Medications were adjusted and he was discharged to rehabilitation facility where he developed buttocks and perineum pain.  He was noted to have necrotizing changes during surgical consultation and admitted to hospital for surgical debridement.  Intraoperatively, significant necrotizing process noted.  Multiple cultures were obtained.  The patient will return to the OR today for second look.    1.  Justin's gangrene  2.  CHF  3.  Medical therapy with Jardiance- unclear if for coronary artery disease/CHF management or hyperglycemia.  4.  Septic shock  5. Estimated Creatinine Clearance: 50.4 mL/min (by C-G formula based on SCr of 1.1 mg/dL).         Plan   1.  I agree with empirical antibiotic therapy with vancomycin, Zosyn and clindamycin.  2.  Gram stain from operative sample showing significant inflammation and presence of mixed organisms including GPCs and GPB.  Usually, this process is polymicrobial, mainly related to enteric gram-negative's and anaerobes with possible presence of Streptococcus and occasional isolation of MRSA.  3.  It appears like initial incision and debridement reached crucial goal of source control of necrotizing process.  However, it is very important to proceed with second look to make sure there is no progression of condition or additional necrosis that will need debridement.  4.  Recommend to add Jardiance to list of allergies.  5.  Continue pain management and postoperative care per ICU team.  6.  We will discuss operative findings with surgery after intervention.  If no evidence of  additional necrosis, will consider discontinuation of clindamycin.  7.  Case discussed with primary team and nursing.  8.  We will follow with you.    More than 70 minutes were spent obtaining a detailed interval history, detailed exam, evaluating this high complexity case with significant medical decision making including continuation of antibiotic therapy, coordination of care with primary team and consultants.      KEN BARNETT MD  INFECTIOUS DISEASES    NOTE: Some or all of the note above was created with the use of dictation software, please note this dictation software can have anomalies in its ability to transcribe. Please contact me directly for anything that seems abnormal for clarification.

## 2022-08-03 NOTE — PRE-PROCEDURE NOTE
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 Plasma Potassium values may be up to 0.4 mEQ/L less than serum values. The differences may be greater for patients with high platelet or white cell counts.         CBC Results       08/02/22 08/02/22 08/01/22     2104 1628 0820    WBC 17.45 18.70 15.55    RBC 3.42 4.05 3.95    HGB 11.8 13.8 13.9    HCT 35.1 41.4 41.2    .6 102.2 104.3    MCH 34.5 34.1 35.2    MCHC 33.6 33.3 33.7     173 183        Coags INR:1.7   Protime: 19.5      Lab Results   Component Value Date    COVID19 Negative 08/02/2022    (tested within 5d of OR)        Impression  Findings suspicious for congestive heart failure.    COMMENT: AP radiograph of the chest was obtained.  We have no prior similar  studies for comparison.  There is cardiomegaly.  There is vascular  congestion/interstitial edema.  There are bilateral effusions.  Findings suggest  congestive heart failure.  Sternal sutures are seen.  There is no pneumothorax.  We have no prior similar studies for comparison.       Blood:  Type and screen active on file.  -Giving 1 unit FFP overnight for INR 1.7  Diet: NPO   Consent: To be obtained by team in AM.         Avril Chester MD  General Surgery Resident  Please page Wilmer [phone] with any questions or concerns.

## 2022-08-03 NOTE — PLAN OF CARE
Problem: Adult Inpatient Plan of Care  Goal: Plan of Care Review  Outcome: Progressing  Flowsheets (Taken 8/3/2022 1255)  Progress: no change  Plan of Care Reviewed With: patient  Outcome Summary: Went to the OR for an EUA, wound debridement for necrotizing soft tissue infection of the perirectal and perineal tissue last night on 8/2. Plan to return to OR today. Remains intubated and sedated in ICU.  Goal: Readiness for Transition of Care  Outcome: Progressing  Intervention: Mutually Develop Transition Plan  Flowsheets (Taken 8/3/2022 1255)  Anticipated Discharge Disposition: skilled nursing facility  Equipment Needed After Discharge: (TBD) other (see comments)  Assistive Device/Animal Currently Used at Home:   cane, straight   walker, front-wheeled  Patient/Family Anticipates Transition to: inpatient rehabilitation facility  Concerns to be Addressed:   care coordination/care conferences   discharge planning   CM attempted to call Pt's EC, left voicemail. From previous recent admission CM found the following info.     Pt recently discharged from [facility] to the [facility], returned from [facility].     Previously, Pt lived independently in a two story home with 5 steps to front door and 12 steps to second floor bed and bath. Pt has cane and walker at home.   Sons were in process of putting chair lift in home.    Emergency contacts: son's  Marko [phone] and Watson [phone]    PCP: Zachery Hernandez  Pharmacy: [facility] and the [facility]     Pt is covid vaccinated with Pfizer.     CM will await return phone call from Watson.

## 2022-08-03 NOTE — OP NOTE
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  This was aggressively debrided yesterday.  He returns for reevaluation of the wound and further debridement as well as a diverting colostomy.  He is critically ill with severe chronic congestive heart failure with particular right-sided failure.    Findings: Fairly healthy wound without any additional necrotizing fasciitis.  Moderate amount of ascites in the abdomen.    Procedure Details   Patient was placed in lithotomy position abdomen table after the induction of general anesthesia via his previous endotracheal tube.  The abdomen was prepped with ChloraPrep and draped sterilely.  A vertical incision umbilicus was made and the and the peritoneal cavity was entered.  Stay sutures were placed and the Palacios trocar was inserted.  A 12 mm port was placed in the right lower quadrant 5 mm port in the right upper quadrant.  The abdomen was explored.  There were significant attachments of the sigmoid colon mesentery to the transverse colon  and the omentum which required mobilization in order to free up the very redundant sigmoid colon.  There were multiple diverticula in the colon.  A window was created in the mesentery of the mid transverse colon which was then divided with the blue load of the [place] stapler.  There was excellent perfusion of the distal portion of the stump.  The mesentery was divided with the LigaSure in order to mobilize the colon to the previously chosen stoma site in the left upper quadrant.  We kept the lateral attachments of the colon in place.  ICG imaging was used to show the degree of perfusion of the colon.  Trephine was created in the left rectus muscle at the chosen location above the umbilicus and the colon was brought out for an end colostomy.  The 12 mm port was closed with a suture puller and a 0 Vicryl tie.  The abdomen was deflated.  The ports removed.  The umbilicus was closed with a figure-of-eight suture of 0 PDS and the skin incisions closed with 4-0 Vicryl subcuticular sutures and Dermabond.    The colon was divided well above the skin level where there was excellent perfusion and the mesentery was controlled.  The stoma was everted to the dermis circumferentially with interrupted 3-0 Vicryl.  Some seromuscular sutures were used to help with stoma eversion.  An appliance was placed.    The perianal area was then exposed.  The area was prepped and draped.  The wound was carefully examined and I was not able to identify any further evidence of tracking fasciitis.  There was minimal bleeding but the tissue was quite healthy.  No additional skin or subcutaneous tissue needed debridement.  The wound was aggressively irrigated.    There was a lot of stool in the rectum and the rectum was irrigated using the suction  to evacuate a lot of the stool which was also manually debrided with a retractor.    The wound was then packed with moist Kerlix gauze and a dressing applied.    The patient was taken back to  the intensive care unit intubated in critical condition.    Estimated Blood Loss: 60 mL    Specimens:                Order Name Source Comment Collection Info Order Time   BLOOD GAS, ARTERIAL COMPREHENSIVE Blood, Arterial  Collected By: Shilpi Powell MD 8/3/2022  5:51 PM     Release to patient   Immediate        BLOOD GAS, ARTERIAL COMPREHENSIVE Blood, Arterial Mixed venous Collected By: Mat Bryant MD 8/3/2022  6:06 PM     Release to patient   Immediate        PREPARE RBC  Pre-op for Procedure  8/3/2022  4:27 PM     Transfusion indications   Pre-OP major surgery with bleeding risk          Has consent been obtained?   Yes          Are you aware of this patient having previously identified antibodies?   NO          Does this Patient have Sickle Cell Disease/Sickle Cell Anemia?   NO              Drains:   NG/OG Tube 08/02/22 Center mouth (Active)   Advancement advanced manually 08/02/22 2115   Adjusted Tube Position (cm) 67 cm 08/03/22 0800   Placement Check Methods external tube length measured 08/03/22 0800   Tolerance no adverse signs/symptoms 08/03/22 0800   Securement taped to ETT 08/03/22 0800   Insertion Site Appearance no warmth;no redness;no tenderness;no skin breakdown;no drainage 08/02/22 2115   Suction Setting/Drainage Method low suction;continuous setting 08/03/22 1000   Flush/Irrigation flushed with;water 08/03/22 1000   Enteral Flush Intake (mL) 30 mL 08/03/22 1000   Output (mL) 100 mL 08/03/22 1600       Colostomy Other (comment) LUQ (Active)       Urethral Catheter Temperature probe 16 Fr (Active)   Reason for Continuing Urinary Catheterization Continued need for accurate measurement of urinary output in critically ill patient (critical care only) 08/03/22 0800   Urine Characteristics yellow 08/03/22 0800   Securement Method Securing Device 08/03/22 0800   Output (mL) 30 mL 08/03/22 1600   Net Urine Output (mL) 30 mL 08/03/22 1600       Implants: * No implants in log *            Complications: None           Disposition: ICU - intubated and critically ill.           Condition: stable    Mat Bryant MD

## 2022-08-03 NOTE — CONSULTS
Cardiology Consult Note   [facility]    [facility]  [facility] [address]  [address] [place] [address]    TEL  [phone]  [facility]       Patient: Samy Elena  MRN: [id number]  : 10/6/1930  Admission Date: 2022   Consult Date: 22  Reason for Consult: H/o HFrEF, PH, atrial fibrillation -postop management   Outpatient Cardiologist: Dr. Yovanny Cruz ( office visit 2022)       Samy Elena is a 89 y.o. 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    Cardiology is being consulted for management recommendations given his extensive cardiac history.     Her chart review, the patient had a recent admission at the end of July with changes in speech. CVA was ruled out at the time of that admission .symptoms was attributed to  postural/orthostatic changes related to symptomatic hypotension. Patient was discharged to rehabilitation unit where he developed  buttocks and pubic pain.He was referred to be seen by surgery as outpatient that lead to current admission.     The patient was seen and examined in his room this morning. Unable to provide history or ROS due to intubation.   Per discussion with SICU staff, he will go to the OR for repeat I&D today.       His last cardiovascular follow up visit was in 2022.   Per chart review, he was started on empagliflozin 2022.      Past Medical History:   Diagnosis Date   • Atrial fibrillation (CMS/HCC)    • Disease of thyroid gland    • GI (gastrointestinal bleed)    • Hypertension    • Myocardial infarction (CMS/HCC)           Past Surgical History:   Procedure Laterality Date   • BYPASS GRAFT         Social History     Socioeconomic History   • Marital status:      Spouse name:  Not on file   • Number of children: Not on file   • Years of education: Not on file   • Highest education level: Not on file   Occupational History   • Not on file   Tobacco Use   • Smoking status: Never Smoker   • Smokeless tobacco: Never Used   Substance and Sexual Activity   • Alcohol use: Not on file   • Drug use: Not on file   • Sexual activity: Not on file   Other Topics Concern   • Not on file   Social History Narrative   • Not on file     Social Determinants of Health     Financial Resource Strain: Not on file   Food Insecurity: No Food Insecurity   • Worried About Running Out of Food in the Last Year: Never true   • Ran Out of Food in the Last Year: Never true   Transportation Needs: Not on file   Physical Activity: Not on file   Stress: Not on file   Social Connections: Not on file   Intimate Partner Violence: Not on file   Housing Stability: Not on file        History reviewed. No pertinent family history.     Patient has no known allergies.    Current Outpatient Medications   Medication Instructions   • acetaminophen (TYLENOL) 325 mg tablet oral   • bisacodyL (DULCOLAX) 10 mg suppository rectal   • clopidogreL (PLAVIX) 75 mg, oral, Daily   • coenzyme Q10 (COQ10) 100 mg, oral, Daily   • empagliflozin (JARDIANCE) 10 mg, oral, Daily   • levothyroxine (SYNTHROID) 100 mcg, oral, Daily (6:30a)   • metoprolol succinate XL (TOPROL-XL) 100 mg, oral, Daily   • multivitamin (THERAGRAN) tablet oral, Daily   • sacubitriL-valsartan (ENTRESTO) 49-51 mg per tablet 1 tablet, oral, 2 times daily   • sildenafiL (pulm.hypertension) (REVATIO) 20 mg, oral, 3 times daily   • simvastatin (ZOCOR) 10 mg, oral, Nightly   • spironolactone (ALDACTONE) 25 mg, oral, Daily   • torsemide (DEMADEX) 10-20 mg, oral, Daily, 10 mg if weight is 176-181 lbs, 20 mg if weight is >181 lbs       Scheduled Meds:  • chlorhexidine  15 mL Mouth/Throat 2 times per day   • clindamycin  900 mg intravenous q8h INT   • heparin (porcine)  5,000 Units  "subcutaneous q8h IVÁN   • insulin aspart U-100  3-5 Units subcutaneous TID with meals   • magnesium sulfate  2 g intravenous Once   • metoprolol  5 mg intravenous q6h INT   • pantoprazole  40 mg intravenous q24h   • piperacillin-tazobactam  4.5 g intravenous q6h INT   • vancomycin  750 mg intravenous q12h INT     Continuous Infusions:  • dexmedetomidine in 0.9 % NaCl  0.2-1.5 mcg/kg/hr Stopped (08/03/22 0608)   • DOBUTamine  5 mcg/kg/min Stopped (08/03/22 0526)   • fentaNYL  0-200 mcg/hr 50 mcg/hr (08/03/22 0700)   • lactated ringer's   125 mL/hr at 08/03/22 0700   • norepinephrine  0.5-90 mcg/min 4 mcg/min (08/03/22 0700)   • vasopressin (VASOSTRICT) continuous infusion (mixture)  0.03 Units/min Stopped (08/03/22 0045)     PRN Meds:.•  glucose **OR** dextrose **OR** glucagon **OR** dextrose 50 % in water (D50)  •  glucose **OR** dextrose **OR** glucagon **OR** dextrose 50 % in water (D50)  •  fentaNYL **AND** fentaNYL  •  HYDROmorphone  •  sodium chloride 0.9 %  •  sodium chloride 0.9 %      Intake/Output Summary (Last 24 hours) at 8/3/2022 0729  Last data filed at 8/3/2022 0700  Gross per 24 hour   Intake 5742.22 ml   Output 1415 ml   Net 4327.22 ml        Weights (last 7 days)     Date/Time Weight Drug Calculation Weight (Dosing Weight)    08/03/22 0300 -- 80.7 kg (177 lb 14.6 oz)    08/02/22 2334 80.7 kg (177 lb 14.6 oz) --    08/02/22 1610 80.7 kg (178 lb) --           Wt Readings from Last 3 Encounters:   08/02/22 80.7 kg (177 lb 14.6 oz)   08/02/22 80.3 kg (177 lb)   07/25/22 81.1 kg (178 lb 11.2 oz)        REVIEW OF SYSTEMS:    A complete review of systems limited by sedation.    PHYSICAL EXAMINATION:  Visit Vitals  BP (!) 129/59   Pulse (!) 58   Temp 36.7 °C (98.1 °F) (Bladder)   Resp (!) 22   Ht 1.854 m (6' 1\")   Wt 80.7 kg (177 lb 14.6 oz)   SpO2 100%   BMI 23.47 kg/m²     Body surface area is 2.04 meters squared.  Body mass index is 23.47 kg/m².  General: intubated and sedated   HEENT: No corneal arcus or " xanthelasmas.  Sclerae are anicteric.   Neck: CVC on the right. cvp 10  Heart: s1s2 audible, no significant murmurs   Chest: Symmetrical.  Lungs: intubated. Breathing sounds audible bl .  Abdomen: soft, nt   Extremities: No cyanosis or clubbing.trace lower extremity edema.  Distal pulses are easily palpable.  Skin: Warm and dry and well perfused.  Neurologic:  Alert and appropriate.  Psychiatric:Normal affect.    Labs   Results from last 7 days   Lab Units 08/03/22  0435 08/02/22 2104 08/02/22  1628   SODIUM mEQ/L 135* 136 134*   POTASSIUM mEQ/L 4.3 3.9 3.9   CHLORIDE mEQ/L 110* 108 106   CO2 mEQ/L 18* 19* 17*   BUN mg/dL 28* 24* 26*   CREATININE mg/dL 1.1 1.0 1.2   CALCIUM mg/dL 8.3* 8.2* 8.5*   ALBUMIN g/dL 2.3* 1.9*  --    BILIRUBIN TOTAL mg/dL 2.6* 2.3*  --    ALK PHOS IU/L 101 98  --    ALT IU/L 32 29  --    AST IU/L 72* 71*  --    GLUCOSE mg/dL 182* 174* 173*       No results found for: HSTROPONINI    Results from last 7 days   Lab Units 08/03/22  0435 08/02/22 2104 08/02/22  1628   WBC K/uL 18.19* 17.45* 18.70*   HEMOGLOBIN g/dL 12.1* 11.8* 13.8   HEMATOCRIT % 36.2* 35.1* 41.4   PLATELETS K/uL 145* 144* 173       Lab Results   Component Value Date    CHOL 94 07/23/2022    HDL 23 (L) 07/23/2022    LDLCALC 63 07/23/2022    TRIG 41 07/23/2022    TSH 4.72 08/01/2022    HGBA1C 6.3 (H) 07/23/2022       Outside records reviewed..    Imaging  CXR reviewed     Cardiac Studies  TTE 04/13/2022 ( outside report , images not available)     •  A complete transthoracic echocardiogram (including 2D, color flow   Doppler, spectral Doppler and M-mode imaging) was performed using the   standard protocol. The study quality was adequate and poor.  •  The left ventricle is normal in size. Endocardium is incompletely   visualized and segmental wall motion abnormalities cannot be excluded.   Moderately decreased left ventricular ejection fraction. The left   ventricular ejection fraction by visual estimate is 35% to 40%.  •  There  is indeterminate diastolic function.  •  The right ventricle is severely dilated. There is moderately to   severely decreased systolic function of the right ventricle.  •  The left atrium is moderately dilated.  •  The right atrium is severely dilated.  •  There is trivial mitral valve leaflet thickening. There is trace mitral   regurgitation. There is no mitral stenosis.  •  The aortic valve is trileaflet. There is mild aortic valve thickening.   There is mild aortic valve calcification. There is no aortic stenosis.   There is mild aortic regurgitation.  •  There is moderate to severe tricuspid regurgitation.  •  The inferior vena cava is dilated (diameter >21 mm) and decreases <50%   in size with inspiration, suggesting an elevated right atrial pressure of   15 mmHg (range 10-20 mm Hg).  •  There is no prior study available for comparison at this organization.    Left Ventricle  The left ventricle is normal in size. Endocardium is incompletely visualized and segmental wall motion abnormalities cannot be excluded. The left ventricular ejection fraction by visual estimate is 35% to 40%. Moderately decreased left ventricular ejection fraction. There is indeterminate diastolic function.    Right Ventricle  The right ventricle is severely dilated. Off axis imaging of the right ventricle, but visually systolic function appears moderate to severely reduced.    Left Atrium  The left atrium is moderately dilated.    Right Atrium  The right atrium is severely dilated.    IVC/SVC  The inferior vena cava is dilated (diameter >21 mm) and decreases <50% in size with inspiration, suggesting an elevated right atrial pressure of 15 mmHg (range 10-20 mm Hg).    Mitral Valve  There is trivial mitral valve leaflet thickening. There is no mitral stenosis. There is trace mitral regurgitation.    Tricuspid Valve  There is moderate tricuspid annular dilatation. There is no tricuspid stenosis. There is moderate to severe tricuspid  regurgitation.    Aortic Valve  The aortic valve is trileaflet. There is mild aortic valve thickening. There is mild aortic valve calcification. There is no aortic stenosis. There is mild aortic regurgitation.    Pulmonic Valve  There is no pulmonic stenosis. There is mild pulmonic valve regurgitation.    Pericardium  There are echocardiographic findings consistent with fat pad.    Aortic Arch/Descending/Abdominal Aorta  The aortic root is normal in size. The proximal segment of the ascending aorta is normal in size.     ECG   07/22/2022   Atrial fibrillation   rbbb   twi on precordial leads, present on ecg 07/2022     Telemetry  Atrial fibrillation , rbbb with ventricular response in the 40s in early am     Assessment:  This is a 89 y.o. male being consulted for cardiovascular management    #Justin's gangrene:   Management per primary service     Remains intubated with plan for OR today   On abx; id has been following   empagliflozin should be discontinued at the time of discharge. He was recently started on this agent in may 2022      # iCM:  # HFrEF:  # PH on sildenafil as outpatient:    NYHA class III and he has ACC/AHA Stage C as outpatient previously       CVP is 10 this morning   Prominent pulm vasculature markings  Significant intake over the past 24 h, including pRBC 1 unit   NPO     Bedside , handheld evaluation notable for dilated ra/rv with visually significantly decreased rv function   Dilated ivc   lv ef in the 40 range   Biventricular involvement     He is on norepinephrine 4 at present   IV metoprolol 5 mg q6h       Administer IV furosemide 40 mg   Strict intake and output   We will obtain limited tte that has been ordered   Continue holding entresto spironolactone and sildenafil while on vasopressor support and npo; pending further course   Please continue transducing cvp   Obtain MVO2 and lactate through cvc q12h   If he remains intubated, he might benefit from inhaled flolan via ett to assist rv  function, although we would require rhc/ pcwp measurements prior to initiation.         # Permanent atrial fibrillation:   [id number]-CDDc 7     Not on AC as outpatient given history of GIB   He is on IV metoprolol; this can be decreased to 2.5 mg given the fact that his ventricular response has been low overnight    Case will be discussed with Dr. Ramirez and with the primary team. Final recommendations are pending attending co-signature. Please page Cardiology at [phone] with any questions.     Sabiha Cordon MD  8/3/2022

## 2022-08-03 NOTE — ANESTHESIA POSTPROCEDURE EVALUATION
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])    Surgeons: Mat Bryant MD Responsible Provider: Joy Abraham MD    Anesthesia Type: general ASA Status: 4 - Emergent          Anesthesia Type: general  PACU Vitals  8/2/2022 2042 - 8/2/2022 2142 8/2/2022  2100 8/2/2022 2105 8/2/2022 2108 8/2/2022 2115    BP: 139/70 126/80 126/80 --    Arterial Line BP: -- 144/67 -- --    Pulse: 79 83 -- 89    Resp: 20 18 -- --    SpO2: 97 % 98 % -- --              8/2/2022 2123 8/2/2022 2128          BP: 112/63 112/63      Arterial Line BP: 122/61 --      Pulse: 83 85      Resp: -- --      SpO2: -- 99 %              Anesthesia Post Evaluation    Mode of pain management: IV medication  Patient location during evaluation: ICU  Patient participation: complete - patient cannot participate  Cardiovascular status: acceptable and hemodynamically stable  Airway Patency: adequate  Respiratory status: ETT, ventilator, acceptable and intubated  Hydration status: stable  Anesthetic complications: no

## 2022-08-03 NOTE — PATIENT CARE CONFERENCE
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

## 2022-08-03 NOTE — BRIEF OP NOTE
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           Disposition: ICU - intubated and critically ill.           Condition: unstable    Mat Bryant MD  Phone Number: [phone]

## 2022-08-04 ENCOUNTER — APPOINTMENT (INPATIENT)
Dept: RADIOLOGY | Facility: HOSPITAL | Age: 87
DRG: 717 | End: 2022-08-04
Attending: SURGERY
Payer: MEDICARE

## 2022-08-04 PROBLEM — R57.9 SHOCK (CMS/HCC): Status: ACTIVE | Noted: 2022-08-02

## 2022-08-04 LAB
ALBUMIN SERPL-MCNC: 2.2 G/DL (ref 3.4–5)
ALP SERPL-CCNC: 69 IU/L (ref 35–126)
ALT SERPL-CCNC: 27 IU/L (ref 16–63)
ANION GAP SERPL CALC-SCNC: 11 MEQ/L (ref 3–15)
ANION GAP SERPL CALC-SCNC: 12 MEQ/L (ref 3–15)
AST SERPL-CCNC: 52 IU/L (ref 15–41)
BASE EXCESS BLDA CALC-SCNC: -2.8 MEQ/L
BASE EXCESS BLDA CALC-SCNC: -3.3 MEQ/L
BILIRUB SERPL-MCNC: 2 MG/DL (ref 0.3–1.2)
BUN SERPL-MCNC: 36 MG/DL (ref 8–20)
BUN SERPL-MCNC: 42 MG/DL (ref 8–20)
CA-I BLD-SCNC: 1.09 MMOL/L (ref 1.15–1.27)
CA-I BLD-SCNC: 1.13 MMOL/L (ref 1.15–1.27)
CA-I BLD-SCNC: 1.13 MMOL/L (ref 1.15–1.27)
CALCIUM SERPL-MCNC: 7.7 MG/DL (ref 8.9–10.3)
CALCIUM SERPL-MCNC: 7.7 MG/DL (ref 8.9–10.3)
CASE RPRT: NORMAL
CHLORIDE BLDA-SCNC: 107 MEQ/L (ref 98–109)
CHLORIDE BLDA-SCNC: 108 MEQ/L (ref 98–109)
CHLORIDE SERPL-SCNC: 106 MEQ/L (ref 98–109)
CHLORIDE SERPL-SCNC: 107 MEQ/L (ref 98–109)
CLINICAL INFO: NORMAL
CO2 BLDA-SCNC: 23 MEQ/L (ref 22–32)
CO2 BLDA-SCNC: 23.3 MEQ/L (ref 22–32)
CO2 SERPL-SCNC: 19 MEQ/L (ref 22–32)
CO2 SERPL-SCNC: 22 MEQ/L (ref 22–32)
COHGB MFR BLD: 1.6 %
COHGB MFR BLD: 1.7 %
CREAT SERPL-MCNC: 1.3 MG/DL (ref 0.8–1.3)
CREAT SERPL-MCNC: 1.4 MG/DL (ref 0.8–1.3)
DATE+TIME DOSE: 1944
DATE+TIME DOSE: NORMAL
ERYTHROCYTE [DISTWIDTH] IN BLOOD BY AUTOMATED COUNT: 14.8 % (ref 11.6–14.4)
FIO2 ON VENT: 40 %
FIO2 ON VENT: 40 %
GFR SERPL CREATININE-BSD FRML MDRD: 47.6 ML/MIN/1.73M*2
GFR SERPL CREATININE-BSD FRML MDRD: 51.9 ML/MIN/1.73M*2
GLUCOSE BLD-MCNC: 157 MG/DL (ref 70–99)
GLUCOSE BLD-MCNC: 183 MG/DL (ref 70–99)
GLUCOSE BLD-MCNC: 193 MG/DL (ref 70–99)
GLUCOSE BLD-MCNC: 207 MG/DL (ref 70–99)
GLUCOSE BLD-MCNC: 211 MG/DL (ref 70–99)
GLUCOSE BLDA-MCNC: 223 MG/DL (ref 70–99)
GLUCOSE BLDA-MCNC: 244 MG/DL (ref 70–99)
GLUCOSE SERPL-MCNC: 192 MG/DL (ref 70–99)
GLUCOSE SERPL-MCNC: 226 MG/DL (ref 70–99)
HCO3 BLDA-SCNC: 22.4 MEQ/L (ref 21–28)
HCO3 BLDA-SCNC: 22.8 MEQ/L (ref 21–28)
HCT VFR BLDCO AUTO: 33.6 % (ref 40.1–51)
HGB BLD-MCNC: 11.5 G/DL (ref 13.7–17.5)
HGB BLDA OXIMETRY-MCNC: 12.2 G/DL (ref 14–17.5)
HGB BLDA OXIMETRY-MCNC: 12.4 G/DL (ref 14–17.5)
INHALED O2 CONCENTRATION: ABNORMAL %
INHALED O2 CONCENTRATION: ABNORMAL %
INR PPP: 1.9
ISBT CODE: 5100
LACTATE BLDA-SCNC: 2 MMOL/L (ref 0.4–1.6)
LACTATE BLDA-SCNC: 2.1 MMOL/L (ref 0.4–1.6)
LACTATE SERPL-SCNC: 1.5 MMOL/L (ref 0.4–2)
LACTATE SERPL-SCNC: 1.9 MMOL/L (ref 0.4–2)
LACTATE SERPL-SCNC: 2 MMOL/L (ref 0.4–2)
LACTATE SERPL-SCNC: 2.1 MMOL/L (ref 0.4–2)
MAGNESIUM SERPL-MCNC: 2.2 MG/DL (ref 1.8–2.5)
MCH RBC QN AUTO: 35.4 PG (ref 28–33.2)
MCHC RBC AUTO-ENTMCNC: 34.2 G/DL (ref 32.2–36.5)
MCV RBC AUTO: 103.4 FL (ref 83–98)
METHGB BLD-SCNC: 0.4 % (ref 0.4–1.5)
METHGB BLD-SCNC: 0.5 % (ref 0.4–1.5)
PATH REPORT.FINAL DX SPEC: NORMAL
PATH REPORT.GROSS SPEC: NORMAL
PCO2 BLDA: 37 MM HG (ref 35–48)
PCO2 BLDA: 40 MM HG (ref 35–48)
PDW BLD AUTO: 10.6 FL (ref 9.4–12.4)
PH BLDA: 7.35 [PH] (ref 7.35–7.45)
PH BLDA: 7.38 [PH] (ref 7.35–7.45)
PHOSPHATE SERPL-MCNC: 4.7 MG/DL (ref 2.4–4.7)
PLATELET # BLD AUTO: 137 K/UL (ref 150–350)
PO2 BLDA: 162 MM HG (ref 83–100)
PO2 BLDA: 168 MM HG (ref 83–100)
POCT OXYHGB: 63.8 % (ref 93–98)
POCT TEST: ABNORMAL
POTASSIUM BLDA-SCNC: 3.7 MEQ/L (ref 3.4–4.5)
POTASSIUM BLDA-SCNC: 3.8 MEQ/L (ref 3.4–4.5)
POTASSIUM SERPL-SCNC: 3.6 MEQ/L (ref 3.6–5.1)
POTASSIUM SERPL-SCNC: 3.7 MEQ/L (ref 3.6–5.1)
PRODUCT CODE: NORMAL
PRODUCT STATUS: NORMAL
PROT SERPL-MCNC: 5 G/DL (ref 6–8.2)
PROTHROMBIN TIME: 21.1 SEC (ref 12.2–14.5)
RBC # BLD AUTO: 3.25 M/UL (ref 4.5–5.8)
SAO2 % BLDA: 98 % (ref 93–98)
SAO2 % BLDA: 98 % (ref 93–98)
SAO2 % BLDV: 71 % (ref 30–60)
SAO2 % BLDV: 73 % (ref 30–60)
SODIUM BLDA-SCNC: 136 MEQ/L (ref 136–145)
SODIUM BLDA-SCNC: 138 MEQ/L (ref 136–145)
SODIUM SERPL-SCNC: 138 MEQ/L (ref 136–144)
SODIUM SERPL-SCNC: 139 MEQ/L (ref 136–144)
SPECIMEN EXP DATE BLD: NORMAL
UNIT ABO: NORMAL
UNIT ID: NORMAL
UNIT RH: POSITIVE
VANCOMYCIN TROUGH SERPL-MCNC: 13 UG/ML (ref 10–15)
WBC # BLD AUTO: 20.68 K/UL (ref 3.8–10.5)

## 2022-08-04 PROCEDURE — 82810 BLOOD GASES O2 SAT ONLY: CPT

## 2022-08-04 PROCEDURE — 83605 ASSAY OF LACTIC ACID: CPT | Performed by: SURGERY

## 2022-08-04 PROCEDURE — 93451 RIGHT HEART CATH: CPT | Mod: 26 | Performed by: INTERNAL MEDICINE

## 2022-08-04 PROCEDURE — 63600000 HC DRUGS/DETAIL CODE: Performed by: SURGERY

## 2022-08-04 PROCEDURE — 27200000 HC STERILE SUPPLY: Performed by: INTERNAL MEDICINE

## 2022-08-04 PROCEDURE — 71045 X-RAY EXAM CHEST 1 VIEW: CPT

## 2022-08-04 PROCEDURE — 63700000 HC SELF-ADMINISTRABLE DRUG: Performed by: SURGERY

## 2022-08-04 PROCEDURE — 82810 BLOOD GASES O2 SAT ONLY: CPT | Performed by: STUDENT IN AN ORGANIZED HEALTH CARE EDUCATION/TRAINING PROGRAM

## 2022-08-04 PROCEDURE — 99024 POSTOP FOLLOW-UP VISIT: CPT | Performed by: COLON & RECTAL SURGERY

## 2022-08-04 PROCEDURE — 4A023N6 MEASUREMENT OF CARDIAC SAMPLING AND PRESSURE, RIGHT HEART, PERCUTANEOUS APPROACH: ICD-10-PCS | Performed by: INTERNAL MEDICINE

## 2022-08-04 PROCEDURE — 99291 CRITICAL CARE FIRST HOUR: CPT | Performed by: SURGERY

## 2022-08-04 PROCEDURE — 80202 ASSAY OF VANCOMYCIN: CPT | Performed by: SURGERY

## 2022-08-04 PROCEDURE — 83050 HGB METHEMOGLOBIN QUAN: CPT | Performed by: STUDENT IN AN ORGANIZED HEALTH CARE EDUCATION/TRAINING PROGRAM

## 2022-08-04 PROCEDURE — 25800000 HC PHARMACY IV SOLUTIONS: Performed by: STUDENT IN AN ORGANIZED HEALTH CARE EDUCATION/TRAINING PROGRAM

## 2022-08-04 PROCEDURE — 85610 PROTHROMBIN TIME: CPT | Performed by: SURGERY

## 2022-08-04 PROCEDURE — 82330 ASSAY OF CALCIUM: CPT | Performed by: SURGERY

## 2022-08-04 PROCEDURE — 83735 ASSAY OF MAGNESIUM: CPT | Performed by: SURGERY

## 2022-08-04 PROCEDURE — 25000000 HC PHARMACY GENERAL: Performed by: SURGERY

## 2022-08-04 PROCEDURE — 63600000 HC DRUGS/DETAIL CODE: Performed by: STUDENT IN AN ORGANIZED HEALTH CARE EDUCATION/TRAINING PROGRAM

## 2022-08-04 PROCEDURE — C1751 CATH, INF, PER/CENT/MIDLINE: HCPCS | Performed by: INTERNAL MEDICINE

## 2022-08-04 PROCEDURE — 93503 INSERT/PLACE HEART CATHETER: CPT | Performed by: INTERNAL MEDICINE

## 2022-08-04 PROCEDURE — 80048 BASIC METABOLIC PNL TOTAL CA: CPT | Performed by: STUDENT IN AN ORGANIZED HEALTH CARE EDUCATION/TRAINING PROGRAM

## 2022-08-04 PROCEDURE — 94003 VENT MGMT INPAT SUBQ DAY: CPT

## 2022-08-04 PROCEDURE — C1769 GUIDE WIRE: HCPCS | Performed by: INTERNAL MEDICINE

## 2022-08-04 PROCEDURE — 20000000 HC ROOM AND CARE ICU

## 2022-08-04 PROCEDURE — 84100 ASSAY OF PHOSPHORUS: CPT | Performed by: SURGERY

## 2022-08-04 PROCEDURE — 36415 COLL VENOUS BLD VENIPUNCTURE: CPT | Performed by: SURGERY

## 2022-08-04 PROCEDURE — 99291 CRITICAL CARE FIRST HOUR: CPT | Performed by: INTERNAL MEDICINE

## 2022-08-04 PROCEDURE — 25800000 HC PHARMACY IV SOLUTIONS: Performed by: SURGERY

## 2022-08-04 PROCEDURE — 93451 RIGHT HEART CATH: CPT | Performed by: INTERNAL MEDICINE

## 2022-08-04 PROCEDURE — 83605 ASSAY OF LACTIC ACID: CPT | Performed by: STUDENT IN AN ORGANIZED HEALTH CARE EDUCATION/TRAINING PROGRAM

## 2022-08-04 PROCEDURE — 25000000 HC PHARMACY GENERAL: Performed by: STUDENT IN AN ORGANIZED HEALTH CARE EDUCATION/TRAINING PROGRAM

## 2022-08-04 PROCEDURE — C1894 INTRO/SHEATH, NON-LASER: HCPCS | Performed by: INTERNAL MEDICINE

## 2022-08-04 PROCEDURE — 85027 COMPLETE CBC AUTOMATED: CPT | Performed by: SURGERY

## 2022-08-04 PROCEDURE — 80053 COMPREHEN METABOLIC PANEL: CPT | Performed by: SURGERY

## 2022-08-04 RX ORDER — POTASSIUM CHLORIDE 14.9 MG/ML
20 INJECTION INTRAVENOUS ONCE
Status: COMPLETED | OUTPATIENT
Start: 2022-08-04 | End: 2022-08-05

## 2022-08-04 RX ORDER — FUROSEMIDE 10 MG/ML
40 INJECTION INTRAMUSCULAR; INTRAVENOUS ONCE
Status: COMPLETED | OUTPATIENT
Start: 2022-08-04 | End: 2022-08-04

## 2022-08-04 RX ORDER — POTASSIUM CHLORIDE 14.9 MG/ML
20 INJECTION INTRAVENOUS ONCE
Status: COMPLETED | OUTPATIENT
Start: 2022-08-04 | End: 2022-08-04

## 2022-08-04 RX ADMIN — INSULIN ASPART 3 UNITS: 100 INJECTION, SOLUTION INTRAVENOUS; SUBCUTANEOUS at 00:07

## 2022-08-04 RX ADMIN — PIPERACILLIN AND TAZOBACTAM 4.5 G: 4; .5 INJECTION, POWDER, LYOPHILIZED, FOR SOLUTION INTRAVENOUS; PARENTERAL at 11:36

## 2022-08-04 RX ADMIN — PIPERACILLIN AND TAZOBACTAM 4.5 G: 4; .5 INJECTION, POWDER, LYOPHILIZED, FOR SOLUTION INTRAVENOUS; PARENTERAL at 00:30

## 2022-08-04 RX ADMIN — SODIUM BICARBONATE: 84 INJECTION, SOLUTION INTRAVENOUS at 08:38

## 2022-08-04 RX ADMIN — FUROSEMIDE 40 MG: 10 INJECTION, SOLUTION INTRAMUSCULAR; INTRAVENOUS at 18:28

## 2022-08-04 RX ADMIN — CLINDAMYCIN PHOSPHATE 900 MG: 900 INJECTION, SOLUTION INTRAVENOUS at 23:41

## 2022-08-04 RX ADMIN — VANCOMYCIN HYDROCHLORIDE 750 MG: 750 INJECTION, POWDER, LYOPHILIZED, FOR SOLUTION INTRAVENOUS at 20:17

## 2022-08-04 RX ADMIN — CHLORHEXIDINE GLUCONATE 15 ML: 1.2 SOLUTION ORAL at 21:45

## 2022-08-04 RX ADMIN — INSULIN ASPART 3 UNITS: 100 INJECTION, SOLUTION INTRAVENOUS; SUBCUTANEOUS at 17:27

## 2022-08-04 RX ADMIN — VASOPRESSIN 0.03 UNITS/MIN: 20 INJECTION PARENTERAL at 16:54

## 2022-08-04 RX ADMIN — DOBUTAMINE 5 MCG/KG/MIN: 12.5 INJECTION, SOLUTION, CONCENTRATE INTRAVENOUS at 00:13

## 2022-08-04 RX ADMIN — CHLORHEXIDINE GLUCONATE 15 ML: 1.2 SOLUTION ORAL at 09:08

## 2022-08-04 RX ADMIN — FUROSEMIDE 40 MG: 10 INJECTION, SOLUTION INTRAVENOUS at 10:31

## 2022-08-04 RX ADMIN — DAKIN'S SOLUTION 0.125% (QUARTER STRENGTH): 0.12 SOLUTION at 09:53

## 2022-08-04 RX ADMIN — CLINDAMYCIN PHOSPHATE 900 MG: 900 INJECTION, SOLUTION INTRAVENOUS at 16:13

## 2022-08-04 RX ADMIN — HEPARIN SODIUM 5000 UNITS: 5000 INJECTION, SOLUTION INTRAVENOUS; SUBCUTANEOUS at 05:11

## 2022-08-04 RX ADMIN — DOBUTAMINE 5 MCG/KG/MIN: 12.5 INJECTION, SOLUTION, CONCENTRATE INTRAVENOUS at 23:43

## 2022-08-04 RX ADMIN — Medication 12 MCG/MIN: at 10:56

## 2022-08-04 RX ADMIN — METOPROLOL TARTRATE 2.5 MG: 5 INJECTION, SOLUTION INTRAVENOUS at 00:04

## 2022-08-04 RX ADMIN — HEPARIN SODIUM 5000 UNITS: 5000 INJECTION, SOLUTION INTRAVENOUS; SUBCUTANEOUS at 21:36

## 2022-08-04 RX ADMIN — INSULIN ASPART 3 UNITS: 100 INJECTION, SOLUTION INTRAVENOUS; SUBCUTANEOUS at 05:08

## 2022-08-04 RX ADMIN — HEPARIN SODIUM 5000 UNITS: 5000 INJECTION, SOLUTION INTRAVENOUS; SUBCUTANEOUS at 13:09

## 2022-08-04 RX ADMIN — METOPROLOL TARTRATE 2.5 MG: 5 INJECTION, SOLUTION INTRAVENOUS at 17:31

## 2022-08-04 RX ADMIN — Medication 8 MCG/MIN: at 00:13

## 2022-08-04 RX ADMIN — POTASSIUM CHLORIDE 20 MEQ: 14.9 INJECTION, SOLUTION INTRAVENOUS at 21:36

## 2022-08-04 RX ADMIN — COLLAGENASE SANTYL 1 APPLICATION.: 250 OINTMENT TOPICAL at 09:53

## 2022-08-04 RX ADMIN — METOPROLOL TARTRATE 2.5 MG: 5 INJECTION, SOLUTION INTRAVENOUS at 05:28

## 2022-08-04 RX ADMIN — PANTOPRAZOLE SODIUM 40 MG: 40 INJECTION, POWDER, FOR SOLUTION INTRAVENOUS at 08:38

## 2022-08-04 RX ADMIN — PIPERACILLIN AND TAZOBACTAM 4.5 G: 4; .5 INJECTION, POWDER, LYOPHILIZED, FOR SOLUTION INTRAVENOUS; PARENTERAL at 17:27

## 2022-08-04 RX ADMIN — CLINDAMYCIN PHOSPHATE 900 MG: 900 INJECTION, SOLUTION INTRAVENOUS at 06:35

## 2022-08-04 RX ADMIN — Medication 75 MCG/HR: at 13:49

## 2022-08-04 RX ADMIN — VASOPRESSIN 0.03 UNITS/MIN: 20 INJECTION PARENTERAL at 05:05

## 2022-08-04 RX ADMIN — METOPROLOL TARTRATE 2.5 MG: 5 INJECTION, SOLUTION INTRAVENOUS at 11:32

## 2022-08-04 RX ADMIN — PIPERACILLIN AND TAZOBACTAM 4.5 G: 4; .5 INJECTION, POWDER, LYOPHILIZED, FOR SOLUTION INTRAVENOUS; PARENTERAL at 05:06

## 2022-08-04 RX ADMIN — INSULIN ASPART 3 UNITS: 100 INJECTION, SOLUTION INTRAVENOUS; SUBCUTANEOUS at 12:03

## 2022-08-04 RX ADMIN — Medication 10 MCG/MIN: at 18:27

## 2022-08-04 RX ADMIN — VANCOMYCIN HYDROCHLORIDE 750 MG: 750 INJECTION, POWDER, LYOPHILIZED, FOR SOLUTION INTRAVENOUS at 09:06

## 2022-08-04 RX ADMIN — METOPROLOL TARTRATE 2.5 MG: 5 INJECTION, SOLUTION INTRAVENOUS at 23:49

## 2022-08-04 ASSESSMENT — ENCOUNTER SYMPTOMS
APNEA: 0
ARTHRALGIAS: 0
AGITATION: 0
FEVER: 1
ABDOMINAL DISTENTION: 0

## 2022-08-04 NOTE — PROGRESS NOTES
"Infectious Disease Progress Note    Patient Name: Samy Elena  MR#: [id number]  : 10/6/1930  Admission Date: 2022  Date: 22   Time: 8:01 AM   Author: Toni Ramirez MD        Antibiotics:    Anti-infectives (From admission, onward)    Start     Dose/Rate Route Frequency Ordered Stop    22 0800  vancomycin 750 mg/250 mL IVPB in NSS        \"And\" Linked Group Details    750 mg  250 mL/hr over 60 Minutes intravenous Every 12 hours interval 22 0514      22 0730  clindamycin (CLEOCIN) in dextrose 5 % 900 mg         900 mg  100 mL/hr over 30 Minutes intravenous Every 8 hours interval 22 0626      22 0615  piperacillin-tazobactam (ZOSYN) 4.5 g/100 mL IVPB in NSS         4.5 g  200 mL/hr over 30 Minutes intravenous Every 6 hours interval 22 0516            Subjective   Remains critical, intubated, sedated, into pressors.  Afebrile.Second look procedure yesterday.  Diverting colostomy performed.  No additional necrotizing changes noted.    Objective     Vital Signs:    Visit Vitals  BP (!) 129/59   Pulse 77   Temp 36.7 °C (98.1 °F) (Bladder)   Resp (!) 24   Ht 1.854 m (6' 1\")   Wt 80.3 kg (177 lb)   SpO2 99%   BMI 23.35 kg/m²       Temp (72hrs), Av.6 °C (97.9 °F), Min:36.3 °C (97.3 °F), Max:37.1 °C (98.7 °F)      Physical Exam:    General: Elderly man, intubated, sedated, pale and frail looking.  HEENT: NC/AT/ anicteric sclera, pharynx clear, ET tube in place, NG tube in place draining scant bilious material  Neck: supple, no meningismus  Cardiovascular: regular S1/S2, distant sounds, soft systolic murmur.   Respiratory: clear to auscultation anteriorly  GI/Abdomen: Obese,  bowel sounds decreased, colostomy bag in place with no output.  Extremities: no clubbing, cyanosis, or edema  JOINTS:  No swelling, erythema, or pain  Lymphatics: no lymphadenopathy  Neurology: Intubated and sedated   psych: Intubated and sedated   skin: There is a large surgical wound in the " perineum extending into left ischial area which is currently packed.   G.U.:  Worsening edema of external genitalia with crepitus palpable in suprapubic area, purpleish color change in penile area.  Will in place.  Lines: Right IJ TLC C/D/I.      Lines, Drains, Airways, Wounds:  CVC Triple Lumen 08/02/22 Internal jugular (Active)   Number of days: 2       Peripheral IV (Adult) 07/22/22 Left Forearm (Active)   Number of days: 13       Peripheral IV (Adult) 08/02/22 Right Forearm (Active)   Number of days: 2       Peripheral IV (Adult) 08/02/22 Anterior;Left;Proximal Forearm (Active)   Number of days: 2       NG/OG Tube 08/02/22 Center mouth (Active)   Number of days: 2       Colostomy Other (comment) LUQ (Active)   Number of days: 1       Urethral Catheter Temperature probe 16 Fr (Active)   Number of days: 2       ETT  (Active)   Number of days: 2       Arterial Line 08/02/22 Left Radial (Active)   Number of days: 2       Surgical Incision Buttock (Active)   Number of days: 2       Surgical Incision Abdomen (Active)   Number of days: 1       Surgical Incision Perineum (Active)   Number of days: 1       Labs:    CBC Results       08/04/22 08/03/22 08/03/22     0504 2200 0435    WBC 20.68 23.29 18.19    RBC 3.25 3.47 3.54    HGB 11.5 12.4 12.1    HCT 33.6 36.2 36.2    .4 104.3 102.3    MCH 35.4 35.7 34.2    MCHC 34.2 34.3 33.4     151 145        CMP Results       08/04/22 08/03/22 08/03/22     0504 2200 0435     140 135    K 3.6 3.9 4.3    Cl 107 111 110    CO2 19 18 18    Glucose 226 190 182    BUN 36 34 28    Creatinine 1.3 1.3 1.1    Calcium 7.7 8.1 8.3    Anion Gap 12 11 7    AST 52 -- 72    ALT 27 -- 32    Albumin 2.2 -- 2.3    EGFR 51.9 51.9 >60.0         Comment for K at 0435 on 08/03/22: MODERATE HEMOLYSIS, RESULT MAY BE INCREASED.    Comment for AST at 0435 on 08/03/22: MODERATE HEMOLYSIS, RESULT MAY BE INCREASED.    Comment for ALT at 0435 on 08/03/22: MODERATE HEMOLYSIS, RESULT MAY BE  INCREASED.            Micro:  Microbiology Results     Procedure Component Value Units Date/Time    MRSA Screen, Nares Only Nose [[id number]]  (Normal) Collected: 08/02/22 2248    Specimen: Nasal Swab from Nose Updated: 08/03/22 0030     MRSA DNA, Nares Negative    SARS-CoV-2 (COVID-19), PCR Nasopharynx [[id number]]  (Normal) Collected: 08/02/22 1633    Specimen: Nasopharyngeal Swab from Nasopharynx Updated: 08/02/22 1807    Narrative:      The following orders were created for panel order SARS-CoV-2 (COVID-19), PCR Nasopharynx.  Procedure                               Abnormality         Status                     ---------                               -----------         ------                     SARS-CoV-2 (COVID-19), P...[[id number]]  Normal              Final result                 Please view results for these tests on the individual orders.    SARS-CoV-2 (COVID-19), PCR Nasopharynx [[id number]]  (Normal) Collected: 08/02/22 1633    Specimen: Nasopharyngeal Swab from Nasopharynx Updated: 08/02/22 1807     SARS-CoV-2 (COVID-19) Negative    Narrative:      Testing performed using real-time PCR for detection of COVID-19. EUA approved validation studies performed on site.     Blood Culture Blood, Venous [[id number]]  (Normal) Collected: 08/02/22 1628    Specimen: Blood, Venous Updated: 08/03/22 1903     Culture No growth at 18-24 hours    Blood Culture Blood, Venous [[id number]]  (Normal) Collected: 08/02/22 1628    Specimen: Blood, Venous Updated: 08/03/22 1903     Culture No growth at 18-24 hours    Anaerobic Culture / Smear (includes Aerobic Culture) [[id number]]  (Abnormal) Collected: 08/02/22 1536    Specimen: Abscess Fluid from Buttock, Left Updated: 08/03/22 1420     Culture **Positive Culture**      2+ Escherichia coli     Gram Stain Result 3+ WBC      2+ Gram positive cocci      2+ Gram positive bacilli    Narrative:      ISOLATION IN PROGRESS      SARS-CoV-2 (COVID-19), PCR Nasopharynx [[id number]]  (Normal)  Collected: 07/25/22 1341    Specimen: Nasopharyngeal Swab from Nasopharynx Updated: 07/25/22 1525    Narrative:      The following orders were created for panel order SARS-CoV-2 (COVID-19), PCR Nasopharynx.  Procedure                               Abnormality         Status                     ---------                               -----------         ------                     SARS-CoV-2 (COVID-19), P...[[id number]]  Normal              Final result                 Please view results for these tests on the individual orders.    SARS-CoV-2 (COVID-19), PCR Nasopharynx [[id number]]  (Normal) Collected: 07/25/22 1341    Specimen: Nasopharyngeal Swab from Nasopharynx Updated: 07/25/22 1525     SARS-CoV-2 (COVID-19) Negative    SARS-CoV-2 (COVID-19), PCR Nasopharynx [[id number]]  (Normal) Collected: 07/22/22 2001    Specimen: Nasopharyngeal Swab from Nasopharynx Updated: 07/22/22 2125    Narrative:      The following orders were created for panel order SARS-CoV-2 (COVID-19), PCR Nasopharynx.  Procedure                               Abnormality         Status                     ---------                               -----------         ------                     SARS-CoV-2 (COVID-19), P...[[id number]]  Normal              Final result                 Please view results for these tests on the individual orders.    SARS-CoV-2 (COVID-19), PCR Nasopharynx [[id number]]  (Normal) Collected: 07/22/22 2001    Specimen: Nasopharyngeal Swab from Nasopharynx Updated: 07/22/22 2125     SARS-CoV-2 (COVID-19) Negative    Narrative:      Testing performed using real-time PCR for detection of COVID-19. EUA approved validation studies performed on site.               Imaging:    Radiology Imaging    XR CHEST 1 VW    Narrative  CLINICAL HISTORY: Intubated    COMMENT:  A portable semiupright AP view of the chest was performed.    Comparison: 13 hours earlier    Cardiac monitoring leads obscure portions of the underlying lungs.  An  endotracheal tube remains in place with its tip in satisfactory position. An  enteric catheter courses below the diaphragm with its tip not included in the  field-of-view of the current examination. A right internal jugular central  venous catheter is in place with its tip at the expected location of the  proximal right atrium. The cardiac silhouette is enlarged. Sternotomy wires and  mediastinal clips are present. Aortic atherosclerosis is present. Right greater  than left pleural effusions are present with adjacent airspace disease and  interstitial prominence. The right pleural effusion is moderate to large and the  left pleural effusion is small. Surgical material is noted in the left upper  quadrant of the abdomen.    --    Impression  See above.      Assessment    89 years old male with history of CHF, A. fib not on anticoagulation due to prior history of GI bleed, tricuspid regurgitation, pulmonary hypertension, coronary disease, who was recently discharged from hospital after he presented with slurred speech and hypotension.  Work-up for CVA was negative and it was concluded that he was suffering from orthostatic hypotension.  Medications were adjusted and he was discharged to rehabilitation facility where he developed buttocks and perineum pain.  He was noted to have necrotizing changes during surgical consultation and admitted to hospital for surgical debridement.  Intraoperatively, significant necrotizing process noted.  Multiple cultures were obtained.  The patient will return to the OR today for second look.     1.  Justin's gangrene  2.  CHF  3.  Medical therapy with Jardiance- unclear if for coronary artery disease/CHF management or hyperglycemia.  4.  Septic shock  5. Estimated Creatinine Clearance: 42.7 mL/min (by C-G formula based on SCr of 1.3 mg/dL).         Plan   1.  Continue therapy with vancomycin, clindamycin and Zosyn.   2.  Operative culture showing presence of E. coli.  We will  continue current coverage until more information from culture is available and will adjust accordingly.  3.  No additional necrosis appreciated yesterday second look.  However, slight color changes in external genitalia and presence of crepitus appreciated.  Close follow-up recommended and will continue clindamycin for now.  4.  Status post colostomy which will help in avoiding secondary contamination of wound site.  5.  Continue supportive care in ICU.  6.  We will follow with you.    More than 35 minutes were spent obtaining a detailed interval history, detailed exam, evaluating this high complexity case with significant medical decision making including continuation of antibiotic therapy, coordination of care with primary team and consultants.      KEN BARNETT MD  INFECTIOUS DISEASES    NOTE: Some or all of the note above was created with the use of dictation software, please note this dictation software can have anomalies in its ability to transcribe. Please contact me directly for anything that seems abnormal for clarification.

## 2022-08-04 NOTE — POST-PROCEDURE NOTE
Cath lab post procedure note:     Procedure: RHC   Indication: shock. h/o pulmonary hypertension   Sedation: Fentanyl   Access site: left internal jugular vein   EBL:< 5cc   Complications: None.       FINDINGS  1. Slightly Elevated right and left heart filling pressures (RA 15, PA 35/18, and PCW 20 mm Hg).  2. Cardiac output and index of 4.87 L/min and 2.4 L/min/m2 respectively.      RECOMMENDATIONS  1. IV diuresis.  2. Continue vasopressor / inotropic support.   3. Defer to the CCU cardiology consult team.       Full report to follow.     Sabiha Cordon MD

## 2022-08-04 NOTE — PROGRESS NOTES
SURGICAL CRITICAL CARE DAILY PROGRESS NOTE     PATIENT NAME:  Samy Elena YOB: 1930    AGE:  89 y.o.  GENDER: male   MRN:  [id number]  PATIENT #: [id number]     SUBJECTIVE   Went to the OR for an EUA, wound debridement for necrotizing soft tissue infection of the perirectal and perineal tissue last night on .   S/p /p Diverting End Sigmoid colostomy, rectal irrigation, perineal washout without further debridement on 8/3    Overnight was acidotic. Given 2 amps BiCarb, 2 of calcium. Started on Bicarb drip. By morning, acidosis corrected and UOP adequate.     Remains intubated and sedated in ICU. Alert and nods appropriately to questions. Denies abdominal pain.     Now weaning off pressors. Currently Levo 4, Vaso 0.03, Doubutamine of 5. MAPS >65. Cardiology following. Recs ABG/Lactate Q12, trend Lactate.     Vented with ETT. 20/450/40/6. AB.38    Has new end-colostomy. Pink, perfused, protruding. No stool yet.     ID following. On Vanc, Zosyn, Clinda.      No plan for further OR.       VITAL SIGNS     Temperature:   No data recorded.       Last 24 hours:    Heart Rate:  [] 77  Resp:  [13-26] 24  BP: (129)/(59) 129/59  FiO2 (%) (Set):  [40 %] 40 %    VENT SETTINGS:   Vent Mode: Pressure regulated volume control  FiO2 (%) (Set):  [40 %] 40 %  S RR:  [20] 20  S VT:  [450 mL] 450 mL  PEEP/CPAP (Set, cmH2O):  [6 cm H20] 6 cm H20  MAP (Observed, cmH2O):  [8-9] 9    INTAKE & OUTPUT     I/O last 3 completed shifts:  In: 9521.4 [I.V.:6911.4; Blood:400; NG/GT:160; IV Piggyback:]  Out: 2970 [Urine:2230; Emesis/NG output:400; Blood:340]    Intake/Output Summary (Last 24 hours) at 2022 0804  Last data filed at 2022 0700  Gross per 24 hour   Intake 4780.8 ml   Output 1455 ml   Net 3325.8 ml     I/O this shift:  In: 142.6 [I.V.:142.6]  Out: -      MEDICATIONS     Infusions:    • dexmedetomidine in 0.9 % NaCl  0.2-1.5 mcg/kg/hr Stopped (22)   • DOBUTamine  5 mcg/kg/min 5  "mcg/kg/min (08/04/22 0700)   • fentaNYL  0-200 mcg/hr 50 mcg/hr (08/04/22 0700)   • norepinephrine  0.5-90 mcg/min 11 mcg/min (08/04/22 0757)   • sodium bicarbonate infusion   100 mL/hr at 08/04/22 0700   • vasopressin (VASOSTRICT) continuous infusion (mixture)  0.03 Units/min 0.03 Units/min (08/04/22 0700)            Scheduled:   • chlorhexidine  15 mL Mouth/Throat 2 times per day   • clindamycin  900 mg intravenous q8h INT   • collagenase  1 application Topical Daily   • heparin (porcine)  5,000 Units subcutaneous q8h IVÁN   • insulin aspart U-100  3-5 Units subcutaneous q6h INT   • metoprolol  2.5 mg intravenous q6h INT   • pantoprazole  40 mg intravenous q24h   • piperacillin-tazobactam  4.5 g intravenous q6h INT   • sodium hypochlorite   Topical Daily   • vancomycin  750 mg intravenous q12h INT       Antibiotics:  Anti-infectives (From admission, onward)    Start     Dose/Rate Route Frequency Ordered Stop    08/03/22 0800  vancomycin 750 mg/250 mL IVPB in NSS        \"And\" Linked Group Details    750 mg  250 mL/hr over 60 Minutes intravenous Every 12 hours interval 08/03/22 0514      08/03/22 0730  clindamycin (CLEOCIN) in dextrose 5 % 900 mg         900 mg  100 mL/hr over 30 Minutes intravenous Every 8 hours interval 08/03/22 0626      08/03/22 0615  piperacillin-tazobactam (ZOSYN) 4.5 g/100 mL IVPB in NSS         4.5 g  200 mL/hr over 30 Minutes intravenous Every 6 hours interval 08/03/22 0516            PRN:     glucose, 16-32 g of dextrose, PRN   Or  dextrose, 15-30 g of dextrose, PRN   Or  glucagon, 1 mg, PRN   Or  dextrose 50 % in water (D50), 25 mL, PRN  glucose, 16-32 g of dextrose, PRN   Or  dextrose, 15-30 g of dextrose, PRN   Or  glucagon, 1 mg, PRN   Or  dextrose 50 % in water (D50), 25 mL, PRN  fentaNYL, 25 mcg, q5 min PRN  HYDROmorphone, 0.5 mg, q3h PRN         DIAGNOSTIC DATA     LABS:  Recent Results (from the past 24 hour(s))   Transthoracic echo (TTE) limited    Collection Time: 08/03/22  9:45 " AM   Result Value Ref Range    BSA 2.03 m2    RAL A4C 8.51 cm    TAPSE 1.07 cm    TR VTI 48.60 cm    TV Mean Gradient 0.00 mmHg    TR Peak Velocity 1.55 m/s    TR Peak Gradient 10.00 mmHg    TV Mean Velocity 0.60 m/s    TV Peak Gradient 1.00 mmHg    TV Area PISA 0.37 cm2    TV Regurgitant Fraction 5.00 %    TV Regurgitant Volume 18.00 cm3    Septal TD free wall late diastolic Velocity 0.065 m/s    TV Comp Diameter 7.7 cm    LAD 2D 4.1 cm    RAP 20 mmHg   ECG 12 lead    Collection Time: 08/03/22 10:31 AM   Result Value Ref Range    Ventricular rate 60     Atrial rate 58     QRS duration 166     QT Interval 502     QTC Calculation(Bazett) 502     R Axis 71     T Wave Axis 38    POCT Glucose    Collection Time: 08/03/22 11:41 AM   Result Value Ref Range    POCT Bedside Glucose 148 (H) 70 - 99 mg/dL    POC Test POC    Prepare RBC-(BLOOD BANK ORDER for PRODUCT): 1 Units Transfusion indications: Pre-OP major surgery with bleeding risk    Collection Time: 08/03/22  4:28 PM   Result Value Ref Range    Product Code P3969C42     Unit ID [id number]     Unit ABO O     Unit RH Negative     Crossmatch Compatible     Product Status XM     Unit Expiration Date/Time 306987932346     ISBT Code 9500    Blood Gas, arterial Comprehensive    Collection Time: 08/03/22  5:56 PM   Result Value Ref Range    pH, Arterial 7.09 (LL) 7.35 - 7.45    pCO2, Arterial 62 (H) 35 - 48 mm Hg    pO2, Arterial 128 (H) 83 - 100 mm Hg    HCO3, Arterial 15.8 (L) 21.0 - 28.0 mEQ/L    Base Excess, Arterial -11.7 mEQ/L    O2 Sat, Arterial 97 93 - 98 %    TCO2, Arterial 20.7 (L) 22.0 - 32.0 mEQ/L    Lactate, Art 1.6 0.4 - 1.6 mmol/L    Glucose, Arterial 172 (H) 70 - 99 mg/dL    Sodium, Arterial 136 136 - 145 mEQ/L    Potassium, Arterial 4.2 3.4 - 4.5 mEQ/L    Chloride, Arterial 108 98 - 109 mEQ/L    Ionized Calcium, Arterial 1.25 1.15 - 1.27 mmol/L    Hemoglobin, Arterial 12.8 (L) 14.0 - 17.5 g/dL    Carboxyhemoglobin 1.8 0.0 - 3.0; (Smokers: 0.0 - 9.0) %     Methemoglobin 0.8 0.4 - 1.5 %    Source Of Oxygen ett     FIO2 60    Blood Gas, arterial Comprehensive    Collection Time: 08/03/22  6:05 PM   Result Value Ref Range    pH, Arterial 7.05 (LL) 7.35 - 7.45    pCO2, Arterial 72 (HH) 35 - 48 mm Hg    pO2, Arterial 62 (L) 83 - 100 mm Hg    HCO3, Arterial 15.4 (L) 21.0 - 28.0 mEQ/L    Base Excess, Arterial -11.7 mEQ/L    O2 Sat, Arterial 80 (L) 93 - 98 %    TCO2, Arterial 22.1 22.0 - 32.0 mEQ/L    Lactate, Art 1.8 (H) 0.4 - 1.6 mmol/L    Glucose, Arterial 178 (H) 70 - 99 mg/dL    Sodium, Arterial 135 (L) 136 - 145 mEQ/L    Potassium, Arterial 4.5 3.4 - 4.5 mEQ/L    Chloride, Arterial 108 98 - 109 mEQ/L    Ionized Calcium, Arterial 1.53 (H) 1.15 - 1.27 mmol/L    Hemoglobin, Arterial 13.0 (L) 14.0 - 17.5 g/dL    Carboxyhemoglobin 1.9 0.0 - 3.0; (Smokers: 0.0 - 9.0) %    Methemoglobin 0.6 0.4 - 1.5 %   Blood Gas, arterial Comprehensive    Collection Time: 08/03/22  7:43 PM   Result Value Ref Range    pH, Arterial 7.13 (LL) 7.35 - 7.45    pCO2, Arterial 54 (H) 35 - 48 mm Hg    pO2, Arterial 155 (H) 83 - 100 mm Hg    HCO3, Arterial 16.0 (L) 21.0 - 28.0 mEQ/L    Base Excess, Arterial -11.4 mEQ/L    O2 Sat, Arterial 97 93 - 98 %    TCO2, Arterial 19.7 (L) 22.0 - 32.0 mEQ/L    Lactate, Art 2.2 (H) 0.4 - 1.6 mmol/L    Glucose, Arterial 172 (H) 70 - 99 mg/dL    Sodium, Arterial 134 (L) 136 - 145 mEQ/L    Potassium, Arterial 4.6 (H) 3.4 - 4.5 mEQ/L    Chloride, Arterial 108 98 - 109 mEQ/L    Ionized Calcium, Arterial 1.14 (L) 1.15 - 1.27 mmol/L    Hemoglobin, Arterial 13.2 (L) 14.0 - 17.5 g/dL    Carboxyhemoglobin 2.1 0.0 - 3.0; (Smokers: 0.0 - 9.0) %    Methemoglobin 0.6 0.4 - 1.5 %    Source Of Oxygen 20/450/6     FIO2 40    Basic metabolic panel    Collection Time: 08/03/22 10:00 PM   Result Value Ref Range    Sodium 140 136 - 144 mEQ/L    Potassium 3.9 3.6 - 5.1 mEQ/L    Chloride 111 (H) 98 - 109 mEQ/L    CO2 18 (L) 22 - 32 mEQ/L    BUN 34 (H) 8 - 20 mg/dL    Creatinine 1.3  0.8 - 1.3 mg/dL    Glucose 190 (H) 70 - 99 mg/dL    Calcium 8.1 (L) 8.9 - 10.3 mg/dL    eGFR 51.9 (L) >=60.0 mL/min/1.73m*2    Anion Gap 11 3 - 15 mEQ/L   Magnesium    Collection Time: 08/03/22 10:00 PM   Result Value Ref Range    Magnesium 2.2 1.8 - 2.5 mg/dL   Phosphorus    Collection Time: 08/03/22 10:00 PM   Result Value Ref Range    Phosphorus 5.8 (H) 2.4 - 4.7 mg/dL   Protime-INR    Collection Time: 08/03/22 10:00 PM   Result Value Ref Range    PT 22.1 (H) 12.2 - 14.5 sec    INR 2.0     PTT    Collection Time: 08/03/22 10:00 PM   Result Value Ref Range    PTT 28 23 - 35 sec   CBC    Collection Time: 08/03/22 10:00 PM   Result Value Ref Range    WBC 23.29 (H) 3.80 - 10.50 K/uL    RBC 3.47 (L) 4.50 - 5.80 M/uL    Hemoglobin 12.4 (L) 13.7 - 17.5 g/dL    Hematocrit 36.2 (L) 40.1 - 51.0 %    .3 (H) 83.0 - 98.0 fL    MCH 35.7 (H) 28.0 - 33.2 pg    MCHC 34.3 32.2 - 36.5 g/dL    RDW 14.8 (H) 11.6 - 14.4 %    Platelets 151 150 - 350 K/uL    MPV 10.1 9.4 - 12.4 fL   Blood Gas, arterial Comprehensive    Collection Time: 08/03/22 10:02 PM   Result Value Ref Range    pH, Arterial 7.27 (L) 7.35 - 7.45    pCO2, Arterial 42 35 - 48 mm Hg    pO2, Arterial 135 (H) 83 - 100 mm Hg    HCO3, Arterial 19.2 (L) 21.0 - 28.0 mEQ/L    Base Excess, Arterial -7.3 mEQ/L    O2 Sat, Arterial 97 93 - 98 %    TCO2, Arterial 20.6 (L) 22.0 - 32.0 mEQ/L    Lactate, Art 2.2 (H) 0.4 - 1.6 mmol/L    Glucose, Arterial 195 (H) 70 - 99 mg/dL    Sodium, Arterial 137 136 - 145 mEQ/L    Potassium, Arterial 3.9 3.4 - 4.5 mEQ/L    Chloride, Arterial 108 98 - 109 mEQ/L    Ionized Calcium, Arterial 1.15 1.15 - 1.27 mmol/L    Hemoglobin, Arterial 12.6 (L) 14.0 - 17.5 g/dL    Carboxyhemoglobin 1.9 0.0 - 3.0; (Smokers: 0.0 - 9.0) %    Methemoglobin 0.6 0.4 - 1.5 %    Source Of Oxygen 20/450/6     FIO2 40    POCT Glucose    Collection Time: 08/04/22 12:06 AM   Result Value Ref Range    POCT Bedside Glucose 183 (H) 70 - 99 mg/dL    POC Test POC    CBC     Collection Time: 08/04/22  5:04 AM   Result Value Ref Range    WBC 20.68 (H) 3.80 - 10.50 K/uL    RBC 3.25 (L) 4.50 - 5.80 M/uL    Hemoglobin 11.5 (L) 13.7 - 17.5 g/dL    Hematocrit 33.6 (L) 40.1 - 51.0 %    .4 (H) 83.0 - 98.0 fL    MCH 35.4 (H) 28.0 - 33.2 pg    MCHC 34.2 32.2 - 36.5 g/dL    RDW 14.8 (H) 11.6 - 14.4 %    Platelets 137 (L) 150 - 350 K/uL    MPV 10.6 9.4 - 12.4 fL   Comprehensive metabolic panel    Collection Time: 08/04/22  5:04 AM   Result Value Ref Range    Sodium 138 136 - 144 mEQ/L    Potassium 3.6 3.6 - 5.1 mEQ/L    Chloride 107 98 - 109 mEQ/L    CO2 19 (L) 22 - 32 mEQ/L    BUN 36 (H) 8 - 20 mg/dL    Creatinine 1.3 0.8 - 1.3 mg/dL    Glucose 226 (H) 70 - 99 mg/dL    Calcium 7.7 (L) 8.9 - 10.3 mg/dL    AST (SGOT) 52 (H) 15 - 41 IU/L    ALT (SGPT) 27 16 - 63 IU/L    Alkaline Phosphatase 69 35 - 126 IU/L    Total Protein 5.0 (L) 6.0 - 8.2 g/dL    Albumin 2.2 (L) 3.4 - 5.0 g/dL    Bilirubin, Total 2.0 (H) 0.3 - 1.2 mg/dL    eGFR 51.9 (L) >=60.0 mL/min/1.73m*2    Anion Gap 12 3 - 15 mEQ/L   Magnesium    Collection Time: 08/04/22  5:04 AM   Result Value Ref Range    Magnesium 2.2 1.8 - 2.5 mg/dL   Phosphorus    Collection Time: 08/04/22  5:04 AM   Result Value Ref Range    Phosphorus 4.7 2.4 - 4.7 mg/dL   Protime-INR    Collection Time: 08/04/22  5:04 AM   Result Value Ref Range    PT 21.1 (H) 12.2 - 14.5 sec    INR 1.9     Lactic acid, Venous    Collection Time: 08/04/22  5:04 AM   Result Value Ref Range    Lactate 2.1 (H) 0.4 - 2.0 mmol/L   POCT Glucose    Collection Time: 08/04/22  5:04 AM   Result Value Ref Range    POCT Bedside Glucose 211 (H) 70 - 99 mg/dL    POC Test POC    Calcium, ionized    Collection Time: 08/04/22  5:04 AM   Result Value Ref Range    Ionized Calcium, Venous 1.09 (L) 1.15 - 1.27 mmol/L   Blood Gas, arterial Comprehensive    Collection Time: 08/04/22  5:05 AM   Result Value Ref Range    pH, Arterial 7.38 7.35 - 7.45    pCO2, Arterial 37 35 - 48 mm Hg    pO2, Arterial  168 (H) 83 - 100 mm Hg    HCO3, Arterial 22.8 21.0 - 28.0 mEQ/L    Base Excess, Arterial -2.8 mEQ/L    O2 Sat, Arterial 98 93 - 98 %    TCO2, Arterial 23.0 22.0 - 32.0 mEQ/L    Lactate, Art 2.0 (H) 0.4 - 1.6 mmol/L    Glucose, Arterial 244 (H) 70 - 99 mg/dL    Sodium, Arterial 136 136 - 145 mEQ/L    Potassium, Arterial 3.7 3.4 - 4.5 mEQ/L    Chloride, Arterial 108 98 - 109 mEQ/L    Ionized Calcium, Arterial 1.13 (L) 1.15 - 1.27 mmol/L    Hemoglobin, Arterial 12.4 (L) 14.0 - 17.5 g/dL    Carboxyhemoglobin 1.7 0.0 - 3.0; (Smokers: 0.0 - 9.0) %    Methemoglobin 0.4 0.4 - 1.5 %    Source Of Oxygen 20/450/6     FIO2 40    Lactic Acid (Repeat)    Collection Time: 08/04/22  7:32 AM   Result Value Ref Range    Lactate 2.0 0.4 - 2.0 mmol/L   Oxygen saturation, venous    Collection Time: 08/04/22  7:32 AM   Result Value Ref Range    Oxygen Saturation, Venous 73 (H) 30 - 60 %     Serum creatinine: 1.3 mg/dL 08/04/22 0504  Estimated creatinine clearance: 42.7 mL/min  Microbiology Results     Procedure Component Value Units Date/Time    MRSA Screen, Nares Only Nose [[id number]]  (Normal) Collected: 08/02/22 2248    Specimen: Nasal Swab from Nose Updated: 08/03/22 0030     MRSA DNA, Nares Negative    SARS-CoV-2 (COVID-19), PCR Nasopharynx [[id number]]  (Normal) Collected: 08/02/22 1633    Specimen: Nasopharyngeal Swab from Nasopharynx Updated: 08/02/22 1807    Narrative:      The following orders were created for panel order SARS-CoV-2 (COVID-19), PCR Nasopharynx.  Procedure                               Abnormality         Status                     ---------                               -----------         ------                     SARS-CoV-2 (COVID-19), P...[[id number]]  Normal              Final result                 Please view results for these tests on the individual orders.    SARS-CoV-2 (COVID-19), PCR Nasopharynx [[id number]]  (Normal) Collected: 08/02/22 1633    Specimen: Nasopharyngeal Swab from Nasopharynx Updated:  08/02/22 1807     SARS-CoV-2 (COVID-19) Negative    Narrative:      Testing performed using real-time PCR for detection of COVID-19. EUA approved validation studies performed on site.     Anaerobic Culture / Smear (includes Aerobic Culture) [[id number]] Collected: 08/02/22 1536    Specimen: Abscess Fluid from Buttock, Left Updated: 08/02/22 2247     Gram Stain Result 3+ WBC      2+ Gram positive cocci      2+ Gram positive bacilli    SARS-CoV-2 (COVID-19), PCR Nasopharynx [[id number]]  (Normal) Collected: 07/25/22 1341    Specimen: Nasopharyngeal Swab from Nasopharynx Updated: 07/25/22 1525    Narrative:      The following orders were created for panel order SARS-CoV-2 (COVID-19), PCR Nasopharynx.  Procedure                               Abnormality         Status                     ---------                               -----------         ------                     SARS-CoV-2 (COVID-19), P...[[id number]]  Normal              Final result                 Please view results for these tests on the individual orders.    SARS-CoV-2 (COVID-19), PCR Nasopharynx [[id number]]  (Normal) Collected: 07/25/22 1341    Specimen: Nasopharyngeal Swab from Nasopharynx Updated: 07/25/22 1525     SARS-CoV-2 (COVID-19) Negative    SARS-CoV-2 (COVID-19), PCR Nasopharynx [[id number]]  (Normal) Collected: 07/22/22 2001    Specimen: Nasopharyngeal Swab from Nasopharynx Updated: 07/22/22 2125    Narrative:      The following orders were created for panel order SARS-CoV-2 (COVID-19), PCR Nasopharynx.  Procedure                               Abnormality         Status                     ---------                               -----------         ------                     SARS-CoV-2 (COVID-19), P...[[id number]]  Normal              Final result                 Please view results for these tests on the individual orders.    SARS-CoV-2 (COVID-19), PCR Nasopharynx [[id number]]  (Normal) Collected: 07/22/22 2001    Specimen: Nasopharyngeal Swab  from Nasopharynx Updated: 07/22/22 2125     SARS-CoV-2 (COVID-19) Negative    Narrative:      Testing performed using real-time PCR for detection of COVID-19. EUA approved validation studies performed on site.           IMAGING:  No results found.    Radiology Imaging    XR CHEST 1 VW    Narrative  CLINICAL HISTORY: Ataxia.    --    Impression  Findings suspicious for congestive heart failure.    COMMENT: AP radiograph of the chest was obtained.  We have no prior similar  studies for comparison.  There is cardiomegaly.  There is vascular  congestion/interstitial edema.  There are bilateral effusions.  Findings suggest  congestive heart failure.  Sternal sutures are seen.  There is no pneumothorax.  We have no prior similar studies for comparison.       Lines, Drains, Airways, Wounds:     CVC Triple Lumen 08/02/22 Internal jugular (Active)   Number of days: 1       Peripheral IV (Adult) 07/22/22 Left Forearm (Active)   Number of days: 12       Peripheral IV (Adult) 08/02/22 Right Forearm (Active)   Number of days: 1       Peripheral IV (Adult) 08/02/22 Anterior;Left;Proximal Forearm (Active)   Number of days: 1       NG/OG Tube 08/02/22 Center mouth (Active)   Number of days: 1       Urethral Catheter Temperature probe 16 Fr (Active)   Number of days: 1       ETT  (Active)   Number of days: 1       Arterial Line 08/02/22 Left Radial (Active)   Number of days: 1       Surgical Incision Buttock (Active)   Number of days: 1       PHYSICAL EXAM     General appearance: Intubated and sedated.   Head: normocephalic, without obvious abnormality, atraumatic. ETT in place.   Eyes: negative, no scleral icterus.   Nose: no discharge  Neck: no JVD, symmetrical, trachea midline.    RIJ CVC in place.   Lungs: Mechanical ventilation.   Chest wall: No deformities or palpable masses  Heart: Regular rate and rhythm.   Abdomen: soft, non-distended, non-tender. Incisions clean, dry, intact. No surrounding erythema or evidence of  hematoma.   Ostomy pink, protruding, perfused, productive of gas. No stool.  Genitalia: edematous, erythematous. No streaking or crepitous.    Incisions clean, dry, intact. Erythema stable.   Extremities: extremities warm and well-perfused.   Right A-Line in place. Good waveform.   Pulses: 2+ and symmetric.  Skin: Skin color, texture, turgor normal.  Neurologic: Unable to assess secondary to sedation.       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 male HD#2 s/p EUA, wound debridement for necrotizing soft tissue infection of the perirectal and perineal tissue on . 1 Day Post-Op    NEURO: Intubated & sedated. On Precedex, Fentanyl.     CV: aFib. HR 50-60s. 4 of Levo. MAPS >65. Metop 5mg Q6.    PULM: ETT. 20/500/40/6. AB.4/29/178/20.6. Will keep intubated while on multiple pressors.     GI:  NPO. PPI. LUQ end-colostomy. Productive scant gas, no stool.     :  40 Lasix. Fc. Cr 1.3. (1.0) IVF@ 125cc/hr. UOP of 50-80cc/hr. (0.67cc/kg/hr). Bicarb discontinued.    Genitalia erythematous. No streaking or apparent spread of infection.     HEME: HgB of 12.1. 1u FFP. INR of  1.6.    ID: WBC: 20.7 (23.3, 19.2). NSTI. Vanc, Zosyn, Clinda. OR Cx: 2+ E. Coli. 2+ GPCs, 2+ GPBs. BC with no-growth.     ENDO: SSI. Last B. Q6 POCT.     DVT PPX: SCDs & sq heparin TID    GI PPX:  PPI    DISPOSITION:  SICU.     Please page [phone] or [phone] with any questions or concerns.  Connor Evans MD  2022  8:04 AM

## 2022-08-04 NOTE — PLAN OF CARE
Problem: Adult Inpatient Plan of Care  Goal: Plan of Care Review  Outcome: Progressing  Flowsheets (Taken 8/4/2022 0205)  Progress: improving  Plan of Care Reviewed With: family  Outcome Summary: Returned from OR after washout of wound and diverting colostomy placement. Pt requiring significant amount of levo post op, vaso and dobut infusing as well. Adequate UOP. 2 Amps bicarb given and then pt started on bicarb gtt. Pt arousable to voice and follows commands. Afib controlled and pt afebrile.   Plan of Care Review  Plan of Care Reviewed With: family  Progress: improving  Outcome Summary: Returned from OR after washout of wound and diverting colostomy placement. Pt requiring significant amount of levo post op, vaso and dobut infusing as well. Adequate UOP. 2 Amps bicarb given and then pt started on bicarb gtt. Pt arousable to voice and follows commands. Afib controlled and pt afebrile.

## 2022-08-04 NOTE — DISCHARGE INSTRUCTIONS
[facility] Discharge Instructions  Dr. Bryant    Surgery Follow-up:  Please call Dr. Bryant's office at [phone] upon discharge to schedule your follow-up appointment for 2 weeks.   Please call Dr. Bryant office if you have any questions/concerns.   Call if you experience the following: bleeding or other drainage from wound, fevers.     Specialist Follow up:  Please follow up with Dr. Madonna Dent MD in endocrinology for diabetes management. You may reach her at [phone].  Dr. Dent's office has started you on insulin with your tube feeds to help control your blood sugar  Please follow up with electrophysiology with Dr. Lola DO at [facility] for continued management of your atrial fibrillation. Their office may be reached at [phone].  Please follow up with neurology with Dr. Anthony Brooks DO at [facility] for continued management of your subacute stroke. Their office may be reached at [phone].  Please follow up with Urology with Dr. Maximino Martinez DO at [facility] for evaluation of your penile wound in 1-2 weeks.  Their office may be reached at [phone]    Medications:   All medications should be given via your Dobhoff feeding tube given your risk of aspiration seen on your video swallow test  Pain: Take tylenol as prescribed for mild pain.   Please STOP taking the following medication:   empagaflozin (Jardiance)   Please CONTINUE taking the following new medications:   Plavix 75mg   Aspirin 81mg   Atorvastatin 40mg  Please HOLD taking the following medications until follow up with cardiology:   Spironolactone   Sildenafil    Perry catheter:   Your perry catheter will be maintained in place while you are at a rehab facility and should be replaced every month. It will be due for replacement on 9/23/22. Please follow up with a urologist on an outpatient basis for a void trial and urinary continence management.    Wound:   Perianal wound: You had a large  perianal infection that required debridement in the operating room on 8/2/22 and 8/3/22. You will need packing changes twice per day with Santyl and Dakin's solution. Your packing should be removed first, then a thick (nickel width) amount of Santyl applied to your wound bed. A cotton gauze or roll of Kerlix may then be moistened with Dakin's solution and packed into the wound twice a day. Your rehabilitation facility will be able to continue the packing changes for you.  Penile wound: You developed some necrotic changes to your penis as a result of the illness from your initial infection. Your wounds were debrided by Urology at the bedside on 8/22/22. You will require Daily dressing changes: Cleanse with wound cleanser or NSS. Apply Silvadene, cover with aquacel, dry dressing and change daily.  Nystatin ointment to groin rash twice daily. You will have to follow up with Urology in 1-2 weeks to remove the perry catheter and reevaluate the penile wounds to evaluate how they are healing.     Ostomy:  You now have a diverting end sigmoid colostomy so that your surgical buttock wound does not get contaminated with feces. Your rehabilitation facility will be able to continue ostomy care.    Diet:   You will need to continue feeding through your Dobhoff tube as recommended by nutrition and speech therapy. Speech therapy will continue to work with you during your time at the rehabilitation facility and the facility will be able to manage this. While on the tube feedings, you will need 4 units of regular insulin every 6 hours and a regular insulin sliding scale correction as needed.    Dr. Bryant   [facility]  [address]  [address] [place] [address] [place] [id number] [phone]

## 2022-08-04 NOTE — PATIENT CARE CONFERENCE
Care Progression Rounds Note  Date: 8/4/2022  Time: 11:59 AM     Patient Name: Samy Elena     Medical Record Number: [id number]   YOB: 1930  Sex: Male      Room/Bed: 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

## 2022-08-04 NOTE — PROGRESS NOTES
Vancomycin Dosing by Pharmacy Consult Follow up    Samy Elena is a 89 y.o. male who has been consulted for vancomycin dosing for bacterial skin and skin structure infection.    Reviewed relevant clinical data including weight, renal function, previous vancomycin doses, and vancomycin levels  Creatinine   Date/Time Value Ref Range Status   08/04/2022 0504 1.3 0.8 - 1.3 mg/dL Final   08/03/2022 2200 1.3 0.8 - 1.3 mg/dL Final   08/03/2022 0435 1.1 0.8 - 1.3 mg/dL Final     Vancomycin Tr   Date/Time Value Ref Range Status   08/04/2022 0732 13.0 10.0 - 15.0 ug/mL Final     Comment:     For all patients with complicated infections with methicillin resistant Staphylococcus aureus (MRSA), e.g. endocarditis, osteomyelitis, meningitis, pneumonia; the vancomycin trough therpeutic range is 15-20 ug/mL.      Vancomycin Administrations (last 96 hours)       Date/Time Action Medication Dose Rate    08/04/22 0906 New Bag    vancomycin 750 mg/250 mL IVPB in  mg 250 mL/hr    08/03/22 1944 New Bag    vancomycin 750 mg/250 mL IVPB in  mg 250 mL/hr    08/03/22 0859 New Bag    vancomycin 750 mg/250 mL IVPB in  mg 250 mL/hr    08/02/22 1816 New Bag    vancomycin 1.25 gram/250 mL IVPB in NSS 1,250 mg 166.7 mL/hr        Assessment/Plan  The patient is ordered vancomycin dosing by pharmacy.      The patient’s renal function : Scr slightly elevated (continuing to monitor)    A therapeutic vancomycin trough level of 13.0 yielded on a maintenance dose of 750 mg IV Q 12 H with a goal trough of 10-15 ug/mL.      Will continue vancomycin 750 mg IV Q 12 H     Next trough:  8/5/22 @ 1900    Pharmacy will continue to follow the patient’s vancomycin dosing daily during this course of therapy.    Please call vancomycin levels to the pharmacy. Thank you.    Chrystal Meeks, PharmD

## 2022-08-04 NOTE — PROGRESS NOTES
Cardiology Progress Note   [facility]    [facility]  [facility] [address]  [address] [place] [address]    TEL  [phone]  [facility]       Patient: Samy Elena  MRN: [id number]  : 10/6/1930  Admission Date: 2022   Consult Date: 22  Reason for Consult: H/o HFrEF, PH, atrial fibrillation -postop management   Outpatient Cardiologist: Dr. Yovanny Cruz ( office visit 2022)     Interval History:   Seen and examined this am. He had washout of perineal wound , colonic lavage and colostomy yesterday.  He awakes to voice and appears calm.     He is currently on norepi 8, vasopresin and dobutamine 5  CVP remains 9 -leveled   Wbc 23->20   Lactate 2.1     He is net positive~ 3.2 L since admission   CXR yesterday appeared worse compared with the day prior     HPI:   Samy Elena is a 89 y.o. 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    Cardiology is being consulted for management recommendations given his extensive cardiac history.     Her chart review, the patient had a recent admission at the end of July with changes in speech. CVA was ruled out at the time of that admission .symptoms was attributed to  postural/orthostatic changes related to symptomatic hypotension. Patient was discharged to rehabilitation unit where he developed  buttocks and pubic pain.He was referred to be seen by surgery as outpatient that lead to current admission.       Past Medical History:   Diagnosis Date   • Atrial fibrillation (CMS/HCC)    • Disease of thyroid gland    • GI (gastrointestinal bleed)    • Hypertension    • Myocardial infarction (CMS/HCC)           Past Surgical History:   Procedure Laterality Date   • BYPASS GRAFT         Social History      Socioeconomic History   • Marital status:      Spouse name: Not on file   • Number of children: Not on file   • Years of education: Not on file   • Highest education level: Not on file   Occupational History   • Not on file   Tobacco Use   • Smoking status: Never Smoker   • Smokeless tobacco: Never Used   Substance and Sexual Activity   • Alcohol use: Not on file   • Drug use: Not on file   • Sexual activity: Not on file   Other Topics Concern   • Not on file   Social History Narrative   • Not on file     Social Determinants of Health     Financial Resource Strain: Not on file   Food Insecurity: No Food Insecurity   • Worried About Running Out of Food in the Last Year: Never true   • Ran Out of Food in the Last Year: Never true   Transportation Needs: Not on file   Physical Activity: Not on file   Stress: Not on file   Social Connections: Not on file   Intimate Partner Violence: Not on file   Housing Stability: Not on file        History reviewed. No pertinent family history.     Jardiance [empagliflozin]    Current Outpatient Medications   Medication Instructions   • acetaminophen (TYLENOL) 325 mg tablet oral   • bisacodyL (DULCOLAX) 10 mg suppository rectal   • clopidogreL (PLAVIX) 75 mg, oral, Daily   • coenzyme Q10 (COQ10) 100 mg, oral, Daily   • empagliflozin (JARDIANCE) 10 mg, oral, Daily   • levothyroxine (SYNTHROID) 100 mcg, oral, Daily (6:30a)   • metoprolol succinate XL (TOPROL-XL) 100 mg, oral, Daily   • multivitamin (THERAGRAN) tablet oral, Daily   • sacubitriL-valsartan (ENTRESTO) 49-51 mg per tablet 1 tablet, oral, 2 times daily   • sildenafiL (pulm.hypertension) (REVATIO) 20 mg, oral, 3 times daily   • simvastatin (ZOCOR) 10 mg, oral, Nightly   • spironolactone (ALDACTONE) 25 mg, oral, Daily   • torsemide (DEMADEX) 10-20 mg, oral, Daily, 10 mg if weight is 176-181 lbs, 20 mg if weight is >181 lbs       Scheduled Meds:  • chlorhexidine  15 mL Mouth/Throat 2 times per day   • clindamycin  900  "mg intravenous q8h INT   • collagenase  1 application Topical Daily   • heparin (porcine)  5,000 Units subcutaneous q8h IVÁN   • insulin aspart U-100  3-5 Units subcutaneous q6h INT   • metoprolol  2.5 mg intravenous q6h INT   • pantoprazole  40 mg intravenous q24h   • piperacillin-tazobactam  4.5 g intravenous q6h INT   • sodium hypochlorite   Topical Daily   • vancomycin  750 mg intravenous q12h INT     Continuous Infusions:  • dexmedetomidine in 0.9 % NaCl  0.2-1.5 mcg/kg/hr Stopped (08/03/22 0608)   • DOBUTamine  5 mcg/kg/min 5 mcg/kg/min (08/04/22 0600)   • fentaNYL  0-200 mcg/hr 50 mcg/hr (08/04/22 0600)   • norepinephrine  0.5-90 mcg/min 8 mcg/min (08/04/22 0640)   • sodium bicarbonate infusion   100 mL/hr at 08/04/22 0600   • vasopressin (VASOSTRICT) continuous infusion (mixture)  0.03 Units/min 0.03 Units/min (08/04/22 0600)     PRN Meds:.•  glucose **OR** dextrose **OR** glucagon **OR** dextrose 50 % in water (D50)  •  glucose **OR** dextrose **OR** glucagon **OR** dextrose 50 % in water (D50)  •  fentaNYL **AND** fentaNYL  •  HYDROmorphone      Intake/Output Summary (Last 24 hours) at 8/4/2022 0652  Last data filed at 8/4/2022 0635  Gross per 24 hour   Intake 4921.28 ml   Output 1555 ml   Net 3366.28 ml        Weights (last 7 days)     Date/Time Weight Drug Calculation Weight (Dosing Weight)    08/03/22 0944 80.3 kg (177 lb) --    08/03/22 0300 -- 80.7 kg (177 lb 14.6 oz)    08/02/22 2334 80.7 kg (177 lb 14.6 oz) --    08/02/22 1610 80.7 kg (178 lb) --           Wt Readings from Last 3 Encounters:   08/03/22 80.3 kg (177 lb)   08/02/22 80.3 kg (177 lb)   07/25/22 81.1 kg (178 lb 11.2 oz)        REVIEW OF SYSTEMS:    A complete review of systems limited by sedation.    PHYSICAL EXAMINATION:  Visit Vitals  BP (!) 129/59   Pulse 76   Temp 36.7 °C (98.1 °F) (Bladder)   Resp (!) 22   Ht 1.854 m (6' 1\")   Wt 80.3 kg (177 lb)   SpO2 99%   BMI 23.35 kg/m²     Body surface area is 2.03 meters squared.  Body mass " index is 23.35 kg/m².  General: intubated and sedated   HEENT: No corneal arcus or xanthelasmas.  Sclerae are anicteric.   Neck: CVC on the right. cvp 9   Heart: s1s2 audible, no significant murmurs   Chest: Symmetrical.  Lungs: intubated. Breathing sounds audible bl .  Abdomen: soft, nt   Extremities: No cyanosis or clubbing.trace-1  lower extremity edema.  Distal pulses are easily palpable.  Skin: Warm and dry and well perfused.  Neurologic:  na  Psychiatric:na    Labs   Results from last 7 days   Lab Units 08/04/22  0504 08/03/22 2200 08/03/22  0435 08/02/22  2104   SODIUM mEQ/L 138 140 135* 136   POTASSIUM mEQ/L 3.6 3.9 4.3 3.9   CHLORIDE mEQ/L 107 111* 110* 108   CO2 mEQ/L 19* 18* 18* 19*   BUN mg/dL 36* 34* 28* 24*   CREATININE mg/dL 1.3 1.3 1.1 1.0   CALCIUM mg/dL 7.7* 8.1* 8.3* 8.2*   ALBUMIN g/dL 2.2*  --  2.3* 1.9*   BILIRUBIN TOTAL mg/dL 2.0*  --  2.6* 2.3*   ALK PHOS IU/L 69  --  101 98   ALT IU/L 27  --  32 29   AST IU/L 52*  --  72* 71*   GLUCOSE mg/dL 226* 190* 182* 174*       No results found for: HSTROPONINI    Results from last 7 days   Lab Units 08/04/22  0504 08/03/22 2200 08/03/22  0435   WBC K/uL 20.68* 23.29* 18.19*   HEMOGLOBIN g/dL 11.5* 12.4* 12.1*   HEMATOCRIT % 33.6* 36.2* 36.2*   PLATELETS K/uL 137* 151 145*       Lab Results   Component Value Date    CHOL 94 07/23/2022    HDL 23 (L) 07/23/2022    LDLCALC 63 07/23/2022    TRIG 41 07/23/2022    TSH 4.72 08/01/2022    HGBA1C 6.3 (H) 07/23/2022       Outside records reviewed..    Imaging  CXR reviewed     Cardiac Studies  TTE 04/13/2022 ( outside report , images not available)     •  A complete transthoracic echocardiogram (including 2D, color flow   Doppler, spectral Doppler and M-mode imaging) was performed using the   standard protocol. The study quality was adequate and poor.  •  The left ventricle is normal in size. Endocardium is incompletely   visualized and segmental wall motion abnormalities cannot be excluded.   Moderately  decreased left ventricular ejection fraction. The left   ventricular ejection fraction by visual estimate is 35% to 40%.  •  There is indeterminate diastolic function.  •  The right ventricle is severely dilated. There is moderately to   severely decreased systolic function of the right ventricle.  •  The left atrium is moderately dilated.  •  The right atrium is severely dilated.  •  There is trivial mitral valve leaflet thickening. There is trace mitral   regurgitation. There is no mitral stenosis.  •  The aortic valve is trileaflet. There is mild aortic valve thickening.   There is mild aortic valve calcification. There is no aortic stenosis.   There is mild aortic regurgitation.  •  There is moderate to severe tricuspid regurgitation.  •  The inferior vena cava is dilated (diameter >21 mm) and decreases <50%   in size with inspiration, suggesting an elevated right atrial pressure of   15 mmHg (range 10-20 mm Hg).  •  There is no prior study available for comparison at this organization.    Left Ventricle  The left ventricle is normal in size. Endocardium is incompletely visualized and segmental wall motion abnormalities cannot be excluded. The left ventricular ejection fraction by visual estimate is 35% to 40%. Moderately decreased left ventricular ejection fraction. There is indeterminate diastolic function.    Right Ventricle  The right ventricle is severely dilated. Off axis imaging of the right ventricle, but visually systolic function appears moderate to severely reduced.    Left Atrium  The left atrium is moderately dilated.    Right Atrium  The right atrium is severely dilated.    IVC/SVC  The inferior vena cava is dilated (diameter >21 mm) and decreases <50% in size with inspiration, suggesting an elevated right atrial pressure of 15 mmHg (range 10-20 mm Hg).    Mitral Valve  There is trivial mitral valve leaflet thickening. There is no mitral stenosis. There is trace mitral regurgitation.    Tricuspid  Valve  There is moderate tricuspid annular dilatation. There is no tricuspid stenosis. There is moderate to severe tricuspid regurgitation.    Aortic Valve  The aortic valve is trileaflet. There is mild aortic valve thickening. There is mild aortic valve calcification. There is no aortic stenosis. There is mild aortic regurgitation.    Pulmonic Valve  There is no pulmonic stenosis. There is mild pulmonic valve regurgitation.    Pericardium  There are echocardiographic findings consistent with fat pad.    Aortic Arch/Descending/Abdominal Aorta  The aortic root is normal in size. The proximal segment of the ascending aorta is normal in size.     ECG   07/22/2022   Atrial fibrillation   rbbb   twi on precordial leads, present on ecg 07/2022     Telemetry  Atrial fibrillation , rbbb with ventricular response in 60-80 range over the past 12 hours     Assessment:  This is a 89 y.o. male being consulted for cardiovascular management    #Justin's gangrene:   Management per primary service     Remains intubated with plan for OR today   On abx; id has been following   empagliflozin should be discontinued at the time of discharge. He was recently started on this agent in may 2022      # iCM:  # HFrEF:  # PH on sildenafil as outpatient:    NYHA class III and he has ACC/AHA Stage C as outpatient previously       CVP 9  Worsening pulm vasculature markings     His lvef appears 40-45% with severe RV dilation and diastolic dysfunction.Despite this, his CVP is 9.     He is on norepinephrine 8, vasopressin and dobutamine       Please administer IV furosemide 40 mg   Strict intake and output   Continue holding entresto spironolactone and sildenafil while on vasopressor support and npo; pending further course   Please continue transducing cvp   Obtain MVO2 and lactate through cvc q12h   We will assess need for SGC placement for PCWP/pa measurements and need for inhaled flolan if he remains intubated         # Permanent atrial  fibrillation:   [id number]     Not on AC as outpatient given history of GIB   He is on IV metoprolol 2.5 mg q6h     Case will be discussed with Dr. Ramirez and with the primary team. Final recommendations are pending attending co-signature. Please page Cardiology at [phone] with any questions.     Sabiha Cordon MD  8/4/2022

## 2022-08-04 NOTE — NURSING NOTE
Wound Ostomy Continence Note    Subjective    HPI Patient is a 89 y.o. 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   • Necrotizing soft tissue infection   • Shock (CMS/HCC)      PMH/PSH Medical History:   Past Medical History:   Diagnosis Date   • Atrial fibrillation (CMS/HCC)    • Disease of thyroid gland    • GI (gastrointestinal bleed)    • Hypertension    • Myocardial infarction (CMS/HCC)      Surgical History:   Past Surgical History:   Procedure Laterality Date   • BYPASS GRAFT        Assessment and Recommendation S/p colostomy 08/03/2022;   --stoma, red, moist, protruding, not functioning yet  --on the vent now, WOCN will follow for ostomy teaching prior discharge;     Plan of care discussed with nursing at bedside              Date: 08/04/22  Signature: Madonna Park RN

## 2022-08-04 NOTE — PROGRESS NOTES
General Surgery Post Op Note    Subjective   Patient is intubated and alert in ICU after take back for perineal wound washout. VSS. Patient is on 10 Levo, 5 dobutamine, .03 vaso. Bicarb drip due to ph of 7.13. Had gotten 2 amps of bicarb and 2g Ca before drip started. Voiding appropriately and gastric output from OG. Per nursing often breathing over the vent although not at the moment I saw him. A few hours before, last the patient was turned, dressing on bottom was CDI.     Objective   CBC Results       08/03/22 08/03/22 08/02/22 2200 0435 2104    WBC 23.29 18.19 17.45    RBC 3.47 3.54 3.42    HGB 12.4 12.1 11.8    HCT 36.2 36.2 35.1    .3 102.3 102.6    MCH 35.7 34.2 34.5    MCHC 34.3 33.4 33.6     145 144        BMP Results       08/03/22 08/03/22 08/02/22     2200 0435 2104     135 136    K 3.9 4.3 3.9    Cl 111 110 108    CO2 18 18 19    Glucose 190 182 174    BUN 34 28 24    Creatinine 1.3 1.1 1.0    Calcium 8.1 8.3 8.2    Anion Gap 11 7 9    EGFR 51.9 >60.0 >60.0         Comment for K at 0435 on 08/03/22: MODERATE HEMOLYSIS, RESULT MAY BE INCREASED.        • chlorhexidine  15 mL Mouth/Throat 2 times per day   • clindamycin  900 mg intravenous q8h INT   • collagenase  1 application Topical Daily   • heparin (porcine)  5,000 Units subcutaneous q8h IVÁN   • insulin aspart U-100  3-5 Units subcutaneous q6h INT   • metoprolol  2.5 mg intravenous q6h INT   • pantoprazole  40 mg intravenous q24h   • piperacillin-tazobactam  4.5 g intravenous q6h INT   • sodium hypochlorite   Topical Daily   • vancomycin  750 mg intravenous q12h INT       Head; normocephalic, atraumatic. SANDRA  Chest: equal rise bilaterally, intubated  Skin: ABD on wound is dry and intact  Neurological exam reveals patient is alert      Assessment   This is a 89 y.o. 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

## 2022-08-04 NOTE — PROGRESS NOTES
General Surgery Daily Progress Note    Subjective     Interval History: Taken to OR yesterday afternoon for Lap diverting end sigmoid colostomy, rectal irrigation, and perianal wound washout.   Overnight patient was given additional bicarb and started on bicarb infusion for acidosis.     This morning he is awake on the vent, responsive and following commands to squeeze hands.       Objective     Vital signs in last 24 hours:  Heart Rate:  [] 76  Resp:  [13-26] 22  BP: (129)/(59) 129/59  FiO2 (%) (Set):  [40 %] 40 %      Intake/Output Summary (Last 24 hours) at 8/4/2022 0652  Last data filed at 8/4/2022 0635  Gross per 24 hour   Intake 4921.28 ml   Output 1555 ml   Net 3366.28 ml     Intake/Output this shift:  I/O this shift:  In: 2922.1 [I.V.:2302.1; NG/GT:70; IV Piggyback:550]  Out: 840 [Urine:740; Emesis/NG output:100]    Physical Exam    General appearance: Intubated, Sedated, non-toxic appearance   Neck: central line secure without ecchymosis or erythema.   Lungs: PRVC 450/20/40/6 syncing with vent. No overbreathing  Heart: afib, on multiple pressors (levo, dobutamine, Vaso).  Abdomen: Ostomy pink, non-productive for either gas or stool, no bowel sweat. Abdomen soft and non-distended, non-tender. Incisions are covered with dermabond, no erythema.   Extremities: extremities normal, warm and well-perfused; no cyanosis, clubbing, or edema  Pulses: 2+ and symmetric  Skin: Skin color, texture, turgor normal. No rashes or lesions  Neurologic: Grossly normal      VTE Assessment: I have reassessed and the patient's VTE risk and treatment plan is appropriate.    Labs    CBC:   Lab Results   Component Value Date    WBC 20.68 (H) 08/04/2022    RBC 3.25 (L) 08/04/2022     BMP:   Lab Results   Component Value Date    GLUCOSE 226 (H) 08/04/2022    CO2 19 (L) 08/04/2022    BUN 36 (H) 08/04/2022    CREATININE 1.3 08/04/2022    CALCIUM 7.7 (L) 08/04/2022     Coagulation:   Lab Results   Component Value Date    PT 21.1 (H)  08/04/2022    INR 1.9 08/04/2022       Imaging  I have reviewed the Imaging from the last 24 hrs.      Assessment/Plan         89 y/o male with CAD, Ischemic cardiomyopathy, afib, HTN, HLD, DM who presented to the office with left buttock pain and concerns for Justin's gangrene. Now S/P:     EUA, wound debridement and washout 8/2  Lap Sigmoid end colostomy, rectal stump irrigation, anal wound washout 8/3    -At this point he is diverted with no signs of ongoing infection of the gluteal/anal wound. BID PACKING changes to anal wound with Santyl and Dakins  -WOCN for new ostomy and packing changes  -NGT/NPO until Ostomy function   -Minimize IV fluids given cardiac history, appreciate cardiology recs and agree with diuresis if needed to help wean from ventilator  -Continue broad spectrum abx - growing mixed cultures from gluteal cultures. Appreciate ID recommendations.   -Appreciate critical care management from SICU    Contact [phone] for questions or concerns  Samy Haddad, DO

## 2022-08-04 NOTE — PROGRESS NOTES
1:00am - Patient examined bedside for worsening lactate and persistent acidosis as well as increasing presser requirements    Crepitus in groin area but not over abdomen, no erythema or induration in this area possibly from insufflation  Patient turned and wound was examined, no purulent drainage, no dead tissue, no erythema or induration in the surrounding skin, no tracking  Will give bicarb and calcium to correct acidosis, making good urine so good chance acidosis will eventually correct but I did relay to his family that if we can't chemically correct his acidosis then the next step is dialysis which he is unlikely to ultimately benefit from given his age.    7:00am - Acidosis corrected with bicarb and presser requirements decreased this morning    Zain Shafer MD

## 2022-08-05 ENCOUNTER — APPOINTMENT (INPATIENT)
Dept: RADIOLOGY | Facility: HOSPITAL | Age: 87
DRG: 717 | End: 2022-08-05
Attending: SURGERY
Payer: MEDICARE

## 2022-08-05 LAB
ALBUMIN SERPL-MCNC: 2.1 G/DL (ref 3.4–5)
ALP SERPL-CCNC: 68 IU/L (ref 35–126)
ALT SERPL-CCNC: 26 IU/L (ref 16–63)
ANION GAP SERPL CALC-SCNC: 13 MEQ/L (ref 3–15)
AST SERPL-CCNC: 48 IU/L (ref 15–41)
BASE EXCESS BLDA CALC-SCNC: -1.7 MEQ/L
BASE EXCESS BLDA CALC-SCNC: -2.4 MEQ/L
BILIRUB SERPL-MCNC: 1.9 MG/DL (ref 0.3–1.2)
BUN SERPL-MCNC: 44 MG/DL (ref 8–20)
CA-I BLD-SCNC: 1.08 MMOL/L (ref 1.15–1.27)
CA-I BLD-SCNC: 1.13 MMOL/L (ref 1.15–1.27)
CA-I BLD-SCNC: 1.14 MMOL/L (ref 1.15–1.27)
CALCIUM SERPL-MCNC: 7.6 MG/DL (ref 8.9–10.3)
CHLORIDE BLDA-SCNC: 111 MEQ/L (ref 98–109)
CHLORIDE BLDA-SCNC: 111 MEQ/L (ref 98–109)
CHLORIDE SERPL-SCNC: 106 MEQ/L (ref 98–109)
CO2 BLDA-SCNC: 24.2 MEQ/L (ref 22–32)
CO2 BLDA-SCNC: 25.1 MEQ/L (ref 22–32)
CO2 SERPL-SCNC: 22 MEQ/L (ref 22–32)
COHGB MFR BLD: 1.5 %
COHGB MFR BLD: 1.6 %
CREAT SERPL-MCNC: 1.5 MG/DL (ref 0.8–1.3)
ERYTHROCYTE [DISTWIDTH] IN BLOOD BY AUTOMATED COUNT: 15 % (ref 11.6–14.4)
FIO2 ON VENT: 40 %
FIO2 ON VENT: 40 %
GFR SERPL CREATININE-BSD FRML MDRD: 44 ML/MIN/1.73M*2
GLUCOSE BLD-MCNC: 157 MG/DL (ref 70–99)
GLUCOSE BLD-MCNC: 165 MG/DL (ref 70–99)
GLUCOSE BLD-MCNC: 171 MG/DL (ref 70–99)
GLUCOSE BLDA-MCNC: 190 MG/DL (ref 70–99)
GLUCOSE BLDA-MCNC: 203 MG/DL (ref 70–99)
GLUCOSE SERPL-MCNC: 189 MG/DL (ref 70–99)
HCO3 BLDA-SCNC: 23.1 MEQ/L (ref 21–28)
HCO3 BLDA-SCNC: 23.6 MEQ/L (ref 21–28)
HCT VFR BLDCO AUTO: 34.5 % (ref 40.1–51)
HGB BLD-MCNC: 11.6 G/DL (ref 13.7–17.5)
HGB BLDA OXIMETRY-MCNC: 11.8 G/DL (ref 14–17.5)
HGB BLDA OXIMETRY-MCNC: 12.1 G/DL (ref 14–17.5)
INHALED O2 CONCENTRATION: ABNORMAL %
INHALED O2 CONCENTRATION: ABNORMAL %
INR PPP: 1.4
ISBT CODE: 5100
LACTATE BLDA-SCNC: 1.6 MMOL/L (ref 0.4–1.6)
LACTATE BLDA-SCNC: 1.7 MMOL/L (ref 0.4–1.6)
LACTATE SERPL-SCNC: 1.3 MMOL/L (ref 0.4–2)
LACTATE SERPL-SCNC: 1.5 MMOL/L (ref 0.4–2)
MAGNESIUM SERPL-MCNC: 2.2 MG/DL (ref 1.8–2.5)
MCH RBC QN AUTO: 35.3 PG (ref 28–33.2)
MCHC RBC AUTO-ENTMCNC: 33.6 G/DL (ref 32.2–36.5)
MCV RBC AUTO: 104.9 FL (ref 83–98)
METHGB BLD-SCNC: 0.5 % (ref 0.4–1.5)
METHGB BLD-SCNC: 0.9 % (ref 0.4–1.5)
PCO2 BLDA: 38 MM HG (ref 35–48)
PCO2 BLDA: 45 MM HG (ref 35–48)
PDW BLD AUTO: 10.5 FL (ref 9.4–12.4)
PH BLDA: 7.33 [PH] (ref 7.35–7.45)
PH BLDA: 7.39 [PH] (ref 7.35–7.45)
PHOSPHATE SERPL-MCNC: 4 MG/DL (ref 2.4–4.7)
PLATELET # BLD AUTO: 114 K/UL (ref 150–350)
PO2 BLDA: 144 MM HG (ref 83–100)
PO2 BLDA: 194 MM HG (ref 83–100)
POCT TEST: ABNORMAL
POTASSIUM BLDA-SCNC: 3.7 MEQ/L (ref 3.4–4.5)
POTASSIUM BLDA-SCNC: 4 MEQ/L (ref 3.4–4.5)
POTASSIUM SERPL-SCNC: 4 MEQ/L (ref 3.6–5.1)
PRODUCT CODE: NORMAL
PRODUCT STATUS: NORMAL
PROT SERPL-MCNC: 5.1 G/DL (ref 6–8.2)
PROTHROMBIN TIME: 17.3 SEC (ref 12.2–14.5)
RBC # BLD AUTO: 3.29 M/UL (ref 4.5–5.8)
SAO2 % BLDA: 97 % (ref 93–98)
SAO2 % BLDA: 98 % (ref 93–98)
SAO2 % BLDV: 70 % (ref 30–60)
SAO2 % BLDV: 71 % (ref 30–60)
SODIUM BLDA-SCNC: 138 MEQ/L (ref 136–145)
SODIUM BLDA-SCNC: 139 MEQ/L (ref 136–145)
SODIUM SERPL-SCNC: 141 MEQ/L (ref 136–144)
SPECIMEN EXP DATE BLD: NORMAL
UNIT ABO: NORMAL
UNIT ID: NORMAL
UNIT RH: POSITIVE
WBC # BLD AUTO: 16.59 K/UL (ref 3.8–10.5)

## 2022-08-05 PROCEDURE — 25800000 HC PHARMACY IV SOLUTIONS: Performed by: SURGERY

## 2022-08-05 PROCEDURE — 99233 SBSQ HOSP IP/OBS HIGH 50: CPT | Performed by: INTERNAL MEDICINE

## 2022-08-05 PROCEDURE — 82330 ASSAY OF CALCIUM: CPT | Performed by: SURGERY

## 2022-08-05 PROCEDURE — 63600000 HC DRUGS/DETAIL CODE: Performed by: SURGERY

## 2022-08-05 PROCEDURE — 85018 HEMOGLOBIN: CPT | Performed by: SURGERY

## 2022-08-05 PROCEDURE — 85610 PROTHROMBIN TIME: CPT | Performed by: SURGERY

## 2022-08-05 PROCEDURE — 94003 VENT MGMT INPAT SUBQ DAY: CPT

## 2022-08-05 PROCEDURE — 36415 COLL VENOUS BLD VENIPUNCTURE: CPT

## 2022-08-05 PROCEDURE — 71045 X-RAY EXAM CHEST 1 VIEW: CPT

## 2022-08-05 PROCEDURE — 63600000 HC DRUGS/DETAIL CODE: Performed by: STUDENT IN AN ORGANIZED HEALTH CARE EDUCATION/TRAINING PROGRAM

## 2022-08-05 PROCEDURE — 25000000 HC PHARMACY GENERAL: Performed by: SURGERY

## 2022-08-05 PROCEDURE — 82810 BLOOD GASES O2 SAT ONLY: CPT

## 2022-08-05 PROCEDURE — 80053 COMPREHEN METABOLIC PANEL: CPT | Performed by: SURGERY

## 2022-08-05 PROCEDURE — 83605 ASSAY OF LACTIC ACID: CPT | Performed by: SURGERY

## 2022-08-05 PROCEDURE — 85027 COMPLETE CBC AUTOMATED: CPT | Performed by: SURGERY

## 2022-08-05 PROCEDURE — 63700000 HC SELF-ADMINISTRABLE DRUG: Performed by: SURGERY

## 2022-08-05 PROCEDURE — 85018 HEMOGLOBIN: CPT | Performed by: STUDENT IN AN ORGANIZED HEALTH CARE EDUCATION/TRAINING PROGRAM

## 2022-08-05 PROCEDURE — 84100 ASSAY OF PHOSPHORUS: CPT | Performed by: SURGERY

## 2022-08-05 PROCEDURE — 25800000 HC PHARMACY IV SOLUTIONS: Performed by: STUDENT IN AN ORGANIZED HEALTH CARE EDUCATION/TRAINING PROGRAM

## 2022-08-05 PROCEDURE — 99024 POSTOP FOLLOW-UP VISIT: CPT | Performed by: COLON & RECTAL SURGERY

## 2022-08-05 PROCEDURE — 99291 CRITICAL CARE FIRST HOUR: CPT | Performed by: SURGERY

## 2022-08-05 PROCEDURE — 83735 ASSAY OF MAGNESIUM: CPT | Performed by: SURGERY

## 2022-08-05 PROCEDURE — 20000000 HC ROOM AND CARE ICU

## 2022-08-05 RX ORDER — FUROSEMIDE 10 MG/ML
40 INJECTION INTRAMUSCULAR; INTRAVENOUS ONCE
Status: COMPLETED | OUTPATIENT
Start: 2022-08-05 | End: 2022-08-05

## 2022-08-05 RX ADMIN — PANTOPRAZOLE SODIUM 40 MG: 40 INJECTION, POWDER, FOR SOLUTION INTRAVENOUS at 08:20

## 2022-08-05 RX ADMIN — CEFEPIME 2 G: 2 INJECTION, POWDER, FOR SOLUTION INTRAVENOUS at 12:25

## 2022-08-05 RX ADMIN — CLINDAMYCIN PHOSPHATE 900 MG: 900 INJECTION, SOLUTION INTRAVENOUS at 15:03

## 2022-08-05 RX ADMIN — DEXMEDETOMIDINE 0.2 MCG/KG/HR: 200 INJECTION, SOLUTION INTRAVENOUS at 20:30

## 2022-08-05 RX ADMIN — CHLORHEXIDINE GLUCONATE 15 ML: 1.2 SOLUTION ORAL at 10:37

## 2022-08-05 RX ADMIN — CLINDAMYCIN PHOSPHATE 900 MG: 900 INJECTION, SOLUTION INTRAVENOUS at 07:15

## 2022-08-05 RX ADMIN — Medication 75 MCG/HR: at 19:51

## 2022-08-05 RX ADMIN — METOPROLOL TARTRATE 2.5 MG: 5 INJECTION, SOLUTION INTRAVENOUS at 06:12

## 2022-08-05 RX ADMIN — PIPERACILLIN AND TAZOBACTAM 4.5 G: 4; .5 INJECTION, POWDER, LYOPHILIZED, FOR SOLUTION INTRAVENOUS; PARENTERAL at 06:15

## 2022-08-05 RX ADMIN — DAKIN'S SOLUTION 0.125% (QUARTER STRENGTH): 0.12 SOLUTION at 08:20

## 2022-08-05 RX ADMIN — PIPERACILLIN AND TAZOBACTAM 4.5 G: 4; .5 INJECTION, POWDER, LYOPHILIZED, FOR SOLUTION INTRAVENOUS; PARENTERAL at 01:17

## 2022-08-05 RX ADMIN — VANCOMYCIN HYDROCHLORIDE 750 MG: 750 INJECTION, POWDER, LYOPHILIZED, FOR SOLUTION INTRAVENOUS at 08:08

## 2022-08-05 RX ADMIN — VASOPRESSIN 0.03 UNITS/MIN: 20 INJECTION PARENTERAL at 06:22

## 2022-08-05 RX ADMIN — COLLAGENASE SANTYL 1 APPLICATION.: 250 OINTMENT TOPICAL at 08:20

## 2022-08-05 RX ADMIN — HEPARIN SODIUM 5000 UNITS: 5000 INJECTION, SOLUTION INTRAVENOUS; SUBCUTANEOUS at 05:01

## 2022-08-05 RX ADMIN — CLINDAMYCIN PHOSPHATE 900 MG: 900 INJECTION, SOLUTION INTRAVENOUS at 22:59

## 2022-08-05 RX ADMIN — Medication 6 MCG/MIN: at 04:01

## 2022-08-05 RX ADMIN — Medication 6 MCG/MIN: at 18:04

## 2022-08-05 RX ADMIN — DOBUTAMINE 5 MCG/KG/MIN: 12.5 INJECTION, SOLUTION, CONCENTRATE INTRAVENOUS at 19:50

## 2022-08-05 RX ADMIN — CHLORHEXIDINE GLUCONATE 15 ML: 1.2 SOLUTION ORAL at 22:59

## 2022-08-05 RX ADMIN — POTASSIUM CHLORIDE 20 MEQ: 14.9 INJECTION, SOLUTION INTRAVENOUS at 02:13

## 2022-08-05 RX ADMIN — METOPROLOL TARTRATE 2.5 MG: 5 INJECTION, SOLUTION INTRAVENOUS at 11:33

## 2022-08-05 RX ADMIN — METOPROLOL TARTRATE 2.5 MG: 5 INJECTION, SOLUTION INTRAVENOUS at 17:49

## 2022-08-05 RX ADMIN — HEPARIN SODIUM 5000 UNITS: 5000 INJECTION, SOLUTION INTRAVENOUS; SUBCUTANEOUS at 22:59

## 2022-08-05 RX ADMIN — HEPARIN SODIUM 5000 UNITS: 5000 INJECTION, SOLUTION INTRAVENOUS; SUBCUTANEOUS at 13:45

## 2022-08-05 RX ADMIN — FUROSEMIDE 40 MG: 10 INJECTION, SOLUTION INTRAMUSCULAR; INTRAVENOUS at 11:31

## 2022-08-05 NOTE — PROGRESS NOTES
Vancomycin Dosing by Pharmacy Consult Discontinued    IV Vancomycin Dosing by Pharmacy Protocol was discontinued.  Pharmacy will sign off and no longer dose vancomycin for this patient.    Rick MunsonD

## 2022-08-05 NOTE — PROGRESS NOTES
"Brief Nutrition Note    Recommendations  1. If able to receive enteral nutrition, recommend initiate TF of Peptamen AF at 20ml/hr, advance as medically indicated to goal of 65ml/hr. If euvolemic and not on IV fluids, recommend additional free water flush of 30ml/hr     2. If pt requires TPN, recommend at standard day 1 formulation of 70g AA, 150 g dextrose, 0 g lipid    Clinical Course: Patient is a 89 y.o. 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     Past Medical History:   Diagnosis Date   • Atrial fibrillation (CMS/HCC)    • Disease of thyroid gland    • GI (gastrointestinal bleed)    • Hypertension    • Myocardial infarction (CMS/HCC)      Past Surgical History:   Procedure Laterality Date   • BYPASS GRAFT         Reason for Assessment  Reason For Assessment: identified at risk by screening criteria (MST)     [facility] Nutrition Screen Tool  Has patient lost weight without trying?: 2-->Unsure  If yes,how much weight has been lost?: 2-->Unsure  Has patient been eating poorly due to decreased appetite?: 0-->No  [facility] Nutrition Screen Score: 4       Physical Findings  Overall Physical Appearance: on ventilator support  Gastrointestinal: colostomy (0ml)  Tubes: Orogastric tube (50ml)  Skin: surgical incision     [id number] Physical Appearance  Overall Physical Appearance: on ventilator support  Gastrointestinal: colostomy (0ml)  Tubes: Orogastric tube (50ml)  Skin: surgical incision     Nutrition Order  Nutrition Order: does not meet nutritional requirements  Nutrition Order Comments: NPO     Anthropometrics  Height: 185.4 cm (6' 1\")     Current Weight  Weight Method: Bed scale  Weight: 80.3 kg (177 lb)     Ideal Body Weight (IBW)  Ideal Body Weight (IBW) (kg): 84.86  % Ideal Body Weight: 94.61     Body Mass Index (BMI)  BMI (Calculated): 23.4     Labs/Procedures/Meds  Lab Results Reviewed: reviewed, pertinent   BMP Results       08/05/22 08/04/22 08/04/22     8900 1237 " 0504     139 138    K 4.0 3.7 3.6    Cl 106 106 107    CO2 22 22 19    Glucose 189 192 226    BUN 44 42 36    Creatinine 1.5 1.4 1.3    Calcium 7.6 7.7 7.7     Lab Results   Component Value Date    ALT 26 08/05/2022    AST 48 (H) 08/05/2022    ALKPHOS 68 08/05/2022    BILITOT 1.9 (H) 08/05/2022     Mg 2.2, Phos 4, ,157, 193,   Medications  Pertinent Medications Reviewed: reviewed, pertinent   Scheduled Meds:  • chlorhexidine  15 mL Mouth/Throat 2 times per day   • clindamycin  900 mg intravenous q8h INT   • collagenase  1 application Topical Daily   • heparin (porcine)  5,000 Units subcutaneous q8h IVÁN   • insulin aspart U-100  3-5 Units subcutaneous q6h INT   • metoprolol  2.5 mg intravenous q6h INT   • pantoprazole  40 mg intravenous q24h   • piperacillin-tazobactam  4.5 g intravenous q6h INT   • sodium hypochlorite   Topical Daily   • vancomycin  750 mg intravenous q12h INT     Continuous Infusions:  • dexmedetomidine in 0.9 % NaCl  0.2-1.5 mcg/kg/hr Stopped (08/03/22 0608)   • DOBUTamine  5 mcg/kg/min 5 mcg/kg/min (08/05/22 0900)   • fentaNYL  0-200 mcg/hr 25 mcg/hr (08/05/22 0900)   • norepinephrine  0.5-90 mcg/min 1 mcg/min (08/05/22 0920)   • vasopressin (VASOSTRICT) continuous infusion (mixture)  0.03 Units/min Stopped (08/05/22 0836)     Clinical comments:  Pt s/p 8/3 OR for diverting sigmoid colostomy and washout. Remains intubated, MAP 68 on vasopressin, levophed and dobutamine, sedated on fentanyl, 40% FIO2, PEEP 6.     If able to receive enteral nutrition, recommend initiate TF of Peptamen AF at 20ml/hr, advance as medically indicated to goal of 65ml/hr to provide 1872 kcal (23 kcal/kg), 119g protein (1.5 g protein/kg) and 1270ml free water inherent to formula. If euvolemic and not on IV fluids, recommend additional free water flush of 30ml/hr for a total of 1990ml daily (25ml/kg).     If pt requires TPN, recommend at standard day 1 formulation of 70g AA, 150 g dextrose, 0 g lipid to  provide 790 kcal (10 kcal/kg), 0.9 g protein/kg, GIR 1.3mg/kg/min.    On day 2 if labs wnl, advance to partial goal for first week post op (until 8/11)- 120 g AA, 175g dextrose, 55 g smolipid to provides 1625 kcal (20 kcal/kg), 1.5 g protein/kg, GIR 1.5 mg/kg/min, 345 kcal from lipid.       Goals: meet 80% of needs via most appropriate form of nutrition   Monitor: plan of care, initiation of nutrition   Recommendations: See above       Date: 08/05/22  Signature: Suzanne Norris RD

## 2022-08-05 NOTE — PROGRESS NOTES
General Surgery Daily Progress Note    Subjective     89 y/o male with PMH of biventricular heart failure, ischemic myocardiopathy, Afib admitted with necrotizing soft tissue infection of left gluteus s/p  Lap diverting end sigmoid colostomy, rectal irrigation, and perianal wound washout on 8/4/22.     Interval History:   Went to Cath lab yesterday for RHC and found to have slightly elevated right and left heart filling pressures with normal CO and CI and slightly elevated pulmonary wedge pressures. [place] Niki catheter placed.     No acute events overnight, remains on pressors this morning (Levo6, Dobutamine 5, Vaso 0.03), awake and responsive. Remains on ventilator, failed SBT yesterday for tachypnea and hypotension.     Objective     Vital signs in last 24 hours:  Temp:  [37.1 °C (98.8 °F)] 37.1 °C (98.8 °F)  Heart Rate:  [] 101  Resp:  [9-24] 10  BP: ()/(46-59) 105/57  FiO2 (%) (Set):  [40 %] 40 %      Intake/Output Summary (Last 24 hours) at 8/5/2022 1247  Last data filed at 8/5/2022 1100  Gross per 24 hour   Intake 1964.56 ml   Output 1584 ml   Net 380.56 ml     Intake/Output this shift:  I/O this shift:  In: 229.3 [I.V.:179.3; IV Piggyback:50]  Out: 205 [Urine:205]    Physical Exam    General appearance: Intubated, Sedated, non-toxic appearance   Neck: central line secure without ecchymosis or erythema. [place] secure  Lungs: PRVC 450/20/40/6 syncing with vent. No overbreathing  Heart: afib, on multiple pressors (levo, dobutamine, Vaso).   Abdomen: Ostomy pink, non-productive for either gas or stool, no bowel sweat. Abdomen soft and non-distended, non-tender. Incisions are covered with dermabond, no erythema.   Extremities: extremities normal, warm and well-perfused; no cyanosis, clubbing, or edema  Pulses: 2+ and symmetric  Skin: Skin color, texture, turgor normal. No rashes or lesions  Neurologic: Grossly normal      VTE Assessment: I have reassessed and the patient's VTE risk and treatment plan  is appropriate.    Labs    CBC:   Lab Results   Component Value Date    WBC 16.59 (H) 08/05/2022    RBC 3.29 (L) 08/05/2022     BMP:   Lab Results   Component Value Date    GLUCOSE 189 (H) 08/05/2022    CO2 22 08/05/2022    BUN 44 (H) 08/05/2022    CREATININE 1.5 (H) 08/05/2022    CALCIUM 7.6 (L) 08/05/2022     Coagulation:   Lab Results   Component Value Date    PT 17.3 (H) 08/05/2022    INR 1.4 08/05/2022       Imaging  I have reviewed the Imaging from the last 24 hrs.      Assessment/Plan         89 y/o male with CAD, Ischemic cardiomyopathy, afib, HTN, HLD, DM who presented to the office with left buttock pain and concerns for Justin's gangrene. Now S/P:     EUA, wound debridement and washout 8/2  Lap Sigmoid end colostomy, rectal stump irrigation, anal wound washout 8/3    -Continue BID PACKING changes to anal wound with Santyl and Dakins - please upload picture to epic every other day to monitor wound progression  -WOCN for new ostomy and packing changes  -NGT/NPO until Ostomy function - can start trickle TF at discretion of critical care team.    -Minimize IV fluids given cardiac history, appreciate cardiology recs and agree with diuresis if needed to help wean from ventilator  -Continue broad spectrum abx (Cefipime) - growing mixed cultures from gluteal cultures. Appreciate ID recommendations.   -Appreciate critical care management from SICU    Contact [phone] for questions or concerns  Samy Haddad,

## 2022-08-05 NOTE — PROGRESS NOTES
SURGICAL CRITICAL CARE DAILY PROGRESS NOTE     PATIENT NAME:  Samy Elena YOB: 1930    AGE:  89 y.o.  GENDER: male   MRN:  [id number]  PATIENT #: [id number]     SUBJECTIVE   Went to the OR for an EUA, wound debridement for necrotizing soft tissue infection of the perirectal and perineal tissue last night on .   S/p /p Diverting End Sigmoid colostomy, rectal irrigation, perineal washout without further debridement on 8/3    Remains intubated and sedated in ICU. Alert and nods appropriately to questions. Denies abdominal pain.     Right Heart cath yesterday with mildly elevated filling pressures. RA: 14-16. PA: 34/14. PCW 12. On Vaso 0.03, Levo 6, Doubutamine 5. VGB of 70.      Vented with ETT. 20/450/40/6. AB.33/45/144/23.1    Has new end-colostomy. Pink, perfused, protruding. No stool yet.     ID following. On Clinda, switched to Cefipime 2g Q12.      No plan for further OR.       VITAL SIGNS     Temperature:   Temp (24hrs), Av.1 °C (98.8 °F), Min:37.1 °C (98.8 °F), Max:37.1 °C (98.8 °F)       Last 24 hours:    Temp:  [37.1 °C (98.8 °F)] 37.1 °C (98.8 °F)  Heart Rate:  [] 101  Resp:  [9-24] 10  BP: ()/(46-59) 105/57  FiO2 (%) (Set):  [40 %] 40 %    VENT SETTINGS:   Vent Mode: Pressure support  FiO2 (%) (Set):  [40 %] 40 %  S RR:  [20] 20  S VT:  [450 mL] 450 mL  PEEP/CPAP (Set, cmH2O):  [6 cm H20] 6 cm H20  MAP (Observed, cmH2O):  [9-10] 9    INTAKE & OUTPUT     I/O last 3 completed shifts:  In: 5339.5 [I.V.:3979.5; NG/GT:110; IV Piggyback:1250]  Out: 2644 [Urine:2474; Emesis/NG output:150; Blood:20]    Intake/Output Summary (Last 24 hours) at 2022 1108  Last data filed at 2022 1100  Gross per 24 hour   Intake 2032.16 ml   Output 1709 ml   Net 323.16 ml     I/O this shift:  In: 229.3 [I.V.:179.3; IV Piggyback:50]  Out: 205 [Urine:205]     MEDICATIONS     Infusions:    • dexmedetomidine in 0.9 % NaCl  0.2-1.5 mcg/kg/hr Stopped (22 0608)   • DOBUTamine  5  "mcg/kg/min 5 mcg/kg/min (08/05/22 1100)   • fentaNYL  0-200 mcg/hr 25 mcg/hr (08/05/22 1100)   • norepinephrine  0.5-90 mcg/min 5 mcg/min (08/05/22 1100)   • vasopressin (VASOSTRICT) continuous infusion (mixture)  0.03 Units/min Stopped (08/05/22 0836)            Scheduled:   • chlorhexidine  15 mL Mouth/Throat 2 times per day   • clindamycin  900 mg intravenous q8h INT   • collagenase  1 application Topical Daily   • heparin (porcine)  5,000 Units subcutaneous q8h IVÁN   • insulin aspart U-100  3-5 Units subcutaneous q6h INT   • metoprolol  2.5 mg intravenous q6h INT   • pantoprazole  40 mg intravenous q24h   • piperacillin-tazobactam  4.5 g intravenous q6h INT   • sodium hypochlorite   Topical Daily   • vancomycin  750 mg intravenous q12h INT       Antibiotics:  Anti-infectives (From admission, onward)    Start     Dose/Rate Route Frequency Ordered Stop    08/03/22 0800  vancomycin 750 mg/250 mL IVPB in NSS        \"And\" Linked Group Details    750 mg  250 mL/hr over 60 Minutes intravenous Every 12 hours interval 08/03/22 0514      08/03/22 0730  clindamycin (CLEOCIN) in dextrose 5 % 900 mg         900 mg  100 mL/hr over 30 Minutes intravenous Every 8 hours interval 08/03/22 0626      08/03/22 0615  piperacillin-tazobactam (ZOSYN) 4.5 g/100 mL IVPB in NSS         4.5 g  200 mL/hr over 30 Minutes intravenous Every 6 hours interval 08/03/22 0516            PRN:     glucose, 16-32 g of dextrose, PRN   Or  dextrose, 15-30 g of dextrose, PRN   Or  glucagon, 1 mg, PRN   Or  dextrose 50 % in water (D50), 25 mL, PRN  glucose, 16-32 g of dextrose, PRN   Or  dextrose, 15-30 g of dextrose, PRN   Or  glucagon, 1 mg, PRN   Or  dextrose 50 % in water (D50), 25 mL, PRN  fentaNYL, 25 mcg, q5 min PRN  HYDROmorphone, 0.5 mg, q3h PRN         DIAGNOSTIC DATA     LABS:  Recent Results (from the past 24 hour(s))   Blood Gas, arterial Comprehensive    Collection Time: 08/04/22 12:00 PM   Result Value Ref Range    pH, Arterial 7.35 7.35 - " 7.45    pCO2, Arterial 40 35 - 48 mm Hg    pO2, Arterial 162 (H) 83 - 100 mm Hg    HCO3, Arterial 22.4 21.0 - 28.0 mEQ/L    Base Excess, Arterial -3.3 mEQ/L    O2 Sat, Arterial 98 93 - 98 %    TCO2, Arterial 23.3 22.0 - 32.0 mEQ/L    Lactate, Art 2.1 (H) 0.4 - 1.6 mmol/L    Glucose, Arterial 223 (H) 70 - 99 mg/dL    Sodium, Arterial 138 136 - 145 mEQ/L    Potassium, Arterial 3.8 3.4 - 4.5 mEQ/L    Chloride, Arterial 107 98 - 109 mEQ/L    Ionized Calcium, Arterial 1.13 (L) 1.15 - 1.27 mmol/L    Hemoglobin, Arterial 12.2 (L) 14.0 - 17.5 g/dL    Carboxyhemoglobin 1.6 0.0 - 3.0; (Smokers: 0.0 - 9.0) %    Methemoglobin 0.5 0.4 - 1.5 %    Source Of Oxygen PRVC 40; 6; 450; 20     FIO2 40    POCT Glucose    Collection Time: 08/04/22 12:00 PM   Result Value Ref Range    POCT Bedside Glucose 207 (H) 70 - 99 mg/dL    POC Test POC    POCT OxyHgb    Collection Time: 08/04/22  2:48 PM   Result Value Ref Range    POCT OXYHGB 63.8 (L) 93 - 98 %    POC Test POC    Lactic Acid (Repeat)    Collection Time: 08/04/22  3:59 PM   Result Value Ref Range    Lactate 1.9 0.4 - 2.0 mmol/L   POCT Glucose    Collection Time: 08/04/22  5:16 PM   Result Value Ref Range    POCT Bedside Glucose 193 (H) 70 - 99 mg/dL    POC Test POC    Lactic acid, Venous    Collection Time: 08/04/22  5:17 PM   Result Value Ref Range    Lactate 1.5 0.4 - 2.0 mmol/L   Oxygen saturation, venous    Collection Time: 08/04/22  5:17 PM   Result Value Ref Range    Oxygen Saturation, Venous 71 (H) 30 - 60 %   Basic metabolic panel    Collection Time: 08/04/22  9:33 PM   Result Value Ref Range    Sodium 139 136 - 144 mEQ/L    Potassium 3.7 3.6 - 5.1 mEQ/L    Chloride 106 98 - 109 mEQ/L    CO2 22 22 - 32 mEQ/L    BUN 42 (H) 8 - 20 mg/dL    Creatinine 1.4 (H) 0.8 - 1.3 mg/dL    Glucose 192 (H) 70 - 99 mg/dL    Calcium 7.7 (L) 8.9 - 10.3 mg/dL    eGFR 47.6 (L) >=60.0 mL/min/1.73m*2    Anion Gap 11 3 - 15 mEQ/L   POCT Glucose    Collection Time: 08/04/22 11:48 PM   Result Value  Ref Range    POCT Bedside Glucose 157 (H) 70 - 99 mg/dL    POC Test POC    CBC    Collection Time: 08/05/22  4:46 AM   Result Value Ref Range    WBC 16.59 (H) 3.80 - 10.50 K/uL    RBC 3.29 (L) 4.50 - 5.80 M/uL    Hemoglobin 11.6 (L) 13.7 - 17.5 g/dL    Hematocrit 34.5 (L) 40.1 - 51.0 %    .9 (H) 83.0 - 98.0 fL    MCH 35.3 (H) 28.0 - 33.2 pg    MCHC 33.6 32.2 - 36.5 g/dL    RDW 15.0 (H) 11.6 - 14.4 %    Platelets 114 (L) 150 - 350 K/uL    MPV 10.5 9.4 - 12.4 fL   Comprehensive metabolic panel    Collection Time: 08/05/22  4:46 AM   Result Value Ref Range    Sodium 141 136 - 144 mEQ/L    Potassium 4.0 3.6 - 5.1 mEQ/L    Chloride 106 98 - 109 mEQ/L    CO2 22 22 - 32 mEQ/L    BUN 44 (H) 8 - 20 mg/dL    Creatinine 1.5 (H) 0.8 - 1.3 mg/dL    Glucose 189 (H) 70 - 99 mg/dL    Calcium 7.6 (L) 8.9 - 10.3 mg/dL    AST (SGOT) 48 (H) 15 - 41 IU/L    ALT (SGPT) 26 16 - 63 IU/L    Alkaline Phosphatase 68 35 - 126 IU/L    Total Protein 5.1 (L) 6.0 - 8.2 g/dL    Albumin 2.1 (L) 3.4 - 5.0 g/dL    Bilirubin, Total 1.9 (H) 0.3 - 1.2 mg/dL    eGFR 44.0 (L) >=60.0 mL/min/1.73m*2    Anion Gap 13 3 - 15 mEQ/L   Magnesium    Collection Time: 08/05/22  4:46 AM   Result Value Ref Range    Magnesium 2.2 1.8 - 2.5 mg/dL   Phosphorus    Collection Time: 08/05/22  4:46 AM   Result Value Ref Range    Phosphorus 4.0 2.4 - 4.7 mg/dL   Protime-INR    Collection Time: 08/05/22  4:46 AM   Result Value Ref Range    PT 17.3 (H) 12.2 - 14.5 sec    INR 1.4     Lactic acid, Venous    Collection Time: 08/05/22  4:46 AM   Result Value Ref Range    Lactate 1.3 0.4 - 2.0 mmol/L   Calcium, ionized    Collection Time: 08/05/22  4:46 AM   Result Value Ref Range    Ionized Calcium, Venous 1.14 (L) 1.15 - 1.27 mmol/L   Oxygen saturation, venous    Collection Time: 08/05/22  4:46 AM   Result Value Ref Range    Oxygen Saturation, Venous 70 (H) 30 - 60 %   Blood Gas, arterial Comprehensive    Collection Time: 08/05/22  4:50 AM   Result Value Ref Range    pH,  Arterial 7.33 (L) 7.35 - 7.45    pCO2, Arterial 45 35 - 48 mm Hg    pO2, Arterial 144 (H) 83 - 100 mm Hg    HCO3, Arterial 23.1 21.0 - 28.0 mEQ/L    Base Excess, Arterial -2.4 mEQ/L    O2 Sat, Arterial 97 93 - 98 %    TCO2, Arterial 25.1 22.0 - 32.0 mEQ/L    Lactate, Art 1.6 0.4 - 1.6 mmol/L    Glucose, Arterial 190 (H) 70 - 99 mg/dL    Sodium, Arterial 139 136 - 145 mEQ/L    Potassium, Arterial 4.0 3.4 - 4.5 mEQ/L    Chloride, Arterial 111 (H) 98 - 109 mEQ/L    Ionized Calcium, Arterial 1.08 (L) 1.15 - 1.27 mmol/L    Hemoglobin, Arterial 11.8 (L) 14.0 - 17.5 g/dL    Carboxyhemoglobin 1.6 0.0 - 3.0; (Smokers: 0.0 - 9.0) %    Methemoglobin 0.9 0.4 - 1.5 %    Source Of Oxygen 20/450/6     FIO2 40    POCT Glucose    Collection Time: 08/05/22  4:53 AM   Result Value Ref Range    POCT Bedside Glucose 165 (H) 70 - 99 mg/dL    POC Test POC      Serum creatinine: 1.5 mg/dL (H) 08/05/22 0446  Estimated creatinine clearance: 37 mL/min (A)  Microbiology Results     Procedure Component Value Units Date/Time    MRSA Screen, Nares Only Nose [[id number]]  (Normal) Collected: 08/02/22 2248    Specimen: Nasal Swab from Nose Updated: 08/03/22 0030     MRSA DNA, Nares Negative    SARS-CoV-2 (COVID-19), PCR Nasopharynx [[id number]]  (Normal) Collected: 08/02/22 1633    Specimen: Nasopharyngeal Swab from Nasopharynx Updated: 08/02/22 1807    Narrative:      The following orders were created for panel order SARS-CoV-2 (COVID-19), PCR Nasopharynx.  Procedure                               Abnormality         Status                     ---------                               -----------         ------                     SARS-CoV-2 (COVID-19), P...[[id number]]  Normal              Final result                 Please view results for these tests on the individual orders.    SARS-CoV-2 (COVID-19), PCR Nasopharynx [[id number]]  (Normal) Collected: 08/02/22 1633    Specimen: Nasopharyngeal Swab from Nasopharynx Updated: 08/02/22 1807     SARS-CoV-2  (COVID-19) Negative    Narrative:      Testing performed using real-time PCR for detection of COVID-19. EUA approved validation studies performed on site.     Anaerobic Culture / Smear (includes Aerobic Culture) [[id number]] Collected: 08/02/22 1536    Specimen: Abscess Fluid from Buttock, Left Updated: 08/02/22 2247     Gram Stain Result 3+ WBC      2+ Gram positive cocci      2+ Gram positive bacilli    SARS-CoV-2 (COVID-19), PCR Nasopharynx [[id number]]  (Normal) Collected: 07/25/22 1341    Specimen: Nasopharyngeal Swab from Nasopharynx Updated: 07/25/22 1525    Narrative:      The following orders were created for panel order SARS-CoV-2 (COVID-19), PCR Nasopharynx.  Procedure                               Abnormality         Status                     ---------                               -----------         ------                     SARS-CoV-2 (COVID-19), P...[[id number]]  Normal              Final result                 Please view results for these tests on the individual orders.    SARS-CoV-2 (COVID-19), PCR Nasopharynx [[id number]]  (Normal) Collected: 07/25/22 1341    Specimen: Nasopharyngeal Swab from Nasopharynx Updated: 07/25/22 1525     SARS-CoV-2 (COVID-19) Negative    SARS-CoV-2 (COVID-19), PCR Nasopharynx [[id number]]  (Normal) Collected: 07/22/22 2001    Specimen: Nasopharyngeal Swab from Nasopharynx Updated: 07/22/22 2125    Narrative:      The following orders were created for panel order SARS-CoV-2 (COVID-19), PCR Nasopharynx.  Procedure                               Abnormality         Status                     ---------                               -----------         ------                     SARS-CoV-2 (COVID-19), P...[[id number]]  Normal              Final result                 Please view results for these tests on the individual orders.    SARS-CoV-2 (COVID-19), PCR Nasopharynx [[id number]]  (Normal) Collected: 07/22/22 2001    Specimen: Nasopharyngeal Swab from Nasopharynx Updated:  07/22/22 2125     SARS-CoV-2 (COVID-19) Negative    Narrative:      Testing performed using real-time PCR for detection of COVID-19. EUA approved validation studies performed on site.           IMAGING:  No results found.    Radiology Imaging    XR CHEST 1 VW    Narrative  CLINICAL HISTORY: Ataxia.    --    Impression  Findings suspicious for congestive heart failure.    COMMENT: AP radiograph of the chest was obtained.  We have no prior similar  studies for comparison.  There is cardiomegaly.  There is vascular  congestion/interstitial edema.  There are bilateral effusions.  Findings suggest  congestive heart failure.  Sternal sutures are seen.  There is no pneumothorax.  We have no prior similar studies for comparison.       Lines, Drains, Airways, Wounds:     CVC Triple Lumen 08/02/22 Internal jugular (Active)   Number of days: 1       Peripheral IV (Adult) 07/22/22 Left Forearm (Active)   Number of days: 12       Peripheral IV (Adult) 08/02/22 Right Forearm (Active)   Number of days: 1       Peripheral IV (Adult) 08/02/22 Anterior;Left;Proximal Forearm (Active)   Number of days: 1       NG/OG Tube 08/02/22 Center mouth (Active)   Number of days: 1       Urethral Catheter Temperature probe 16 Fr (Active)   Number of days: 1       ETT  (Active)   Number of days: 1       Arterial Line 08/02/22 Left Radial (Active)   Number of days: 1       Surgical Incision Buttock (Active)   Number of days: 1       PHYSICAL EXAM     General appearance: Intubated and sedated.   Head: normocephalic, without obvious abnormality, atraumatic. ETT in place.   Eyes: negative, no scleral icterus.   Nose: no discharge  Neck: no JVD, symmetrical, trachea midline.    RIJ CVC in place.   Lungs: Mechanical ventilation.   Chest wall: No deformities or palpable masses  Heart: Regular rate and rhythm.   Abdomen: soft, non-distended, non-tender. Incisions clean, dry, intact. No surrounding erythema or evidence of hematoma.   Ostomy pink,  protruding, perfused, productive of gas. No stool.  Genitalia: edematous, erythematous. No streaking or crepitous.    Incisions clean, dry, intact. Erythema stable.   Extremities: extremities warm and well-perfused.   Right A-Line in place. Good waveform.   Pulses: 2+ and symmetric.  Skin: Skin color, texture, turgor normal.  Neurologic: Unable to assess secondary to sedation.       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 male HD#3 s/p EUA, wound debridement for necrotizing soft tissue infection of the perirectal and perineal tissue on . 1 Day Post-Op    NEURO: Intubated & sedated. On Precedex, Fentanyl.     CV: aFib. HR 50-60s. 4 of Levo. MAPS >65. Metop 5mg Q6.    PULM: ETT. 20/500/40/6. AB.4/29/178/20.6. Will keep intubated while on multiple pressors.     GI:  NPO. PPI. LUQ end-colostomy. Productive scant gas, no stool.     :  40 Lasix. Fc. Cr 1.4. (1.2, 1.0) UOP of 1.7L yesterday.    Genitalia erythematous. No streaking or apparent spread of infection.     HEME: HgB of 12.1. 1u FFP. INR of  1.6.    ID: WBC: 20.7 (23.3, 19.2). NSTI. Clinda, Cefipime. OR Cx: 2+ E. Coli. 2+ GPCs, 2+ GPBs. BC NG24.     ENDO: SSI. Q6 POCT.     DVT PPX: SCDs & sq heparin TID    GI PPX:  PPI    DISPOSITION:  SICU.     Please page [phone] or [phone] with any questions or concerns.  Connor Evans MD  2022  11:08 AM

## 2022-08-05 NOTE — PROGRESS NOTES
"Infectious Disease Progress Note    Patient Name: Samy Elena  MR#: [id number]  : 10/6/1930  Admission Date: 2022  Date: 22   Time: 8:32 AM   Author: Toni Ramirez MD        Antibiotics:    Anti-infectives (From admission, onward)    Start     Dose/Rate Route Frequency Ordered Stop    22 0800  vancomycin 750 mg/250 mL IVPB in NSS        \"And\" Linked Group Details    750 mg  250 mL/hr over 60 Minutes intravenous Every 12 hours interval 22 0514      22 0730  clindamycin (CLEOCIN) in dextrose 5 % 900 mg         900 mg  100 mL/hr over 30 Minutes intravenous Every 8 hours interval 22 0626      22 0615  piperacillin-tazobactam (ZOSYN) 4.5 g/100 mL IVPB in NSS         4.5 g  200 mL/hr over 30 Minutes intravenous Every 6 hours interval 22 0516            Subjective   Afebrile, remains critically ill, intubated, sedated, on pressors and dobutamine.  Status post right heart cath and [place] placement.    Objective     Vital Signs:    Visit Vitals  BP (!) 104/46   Pulse 90   Temp 37.1 °C (98.8 °F) (Bladder)   Resp (!) 22   Ht 1.854 m (6' 1\")   Wt 80.3 kg (177 lb)   SpO2 99%   BMI 23.35 kg/m²       Temp (72hrs), Av.7 °C (98 °F), Min:36.3 °C (97.3 °F), Max:37.1 °C (98.8 °F)      Physical Exam:    General: Elderly man, intubated, sedated, pale and frail looking.  HEENT: NC/AT/ anicteric sclera, pharynx clear, ET tube in place, NG tube in place draining scant bilious material  Neck: supple, no meningismus, right IJ TLC, left IJ [place]-Niki catheter  Cardiovascular: regular S1/S2, distant sounds, soft systolic murmur.   Respiratory: clear to auscultation anteriorly  GI/Abdomen: Obese,  bowel sounds decreased, colostomy bag in place with no output.  Abdomen does not appear tender.  Extremities: Edematous extremities   JOINTS:  No swelling, erythema, or pain  Lymphatics: no lymphadenopathy  Neurology: Intubated and sedated   psych: Intubated and sedated   skin: There is a " large surgical wound in the perineum extending into left ischial area which is currently packed.  There are hemorrhagic blisters with drainage forming in penile shaft.  There is significant pain to palpation of the pubic area.  G.U.:  Worsening edema of external genitalia with bloody blisters as described above.  No crepitus.  Will in place.  Lines: Right IJ TLC C/D/I.  Left IJ [place]-Niki catheter      Lines, Drains, Airways, Wounds:  Introducer 08/04/22 Left Internal jugular (Active)   Number of days: 1       CVC Triple Lumen 08/02/22 Internal jugular (Active)   Number of days: 3       Peripheral IV (Adult) 07/22/22 Left Forearm (Active)   Number of days: 14       Peripheral IV (Adult) 08/02/22 Right Forearm (Active)   Number of days: 3       Peripheral IV (Adult) 08/02/22 Anterior;Left;Proximal Forearm (Active)   Number of days: 3       NG/OG Tube 08/02/22 Center mouth (Active)   Number of days: 3       Colostomy Other (comment) LUQ (Active)   Number of days: 2       Urethral Catheter Temperature probe 16 Fr (Active)   Number of days: 3       ETT  (Active)   Number of days: 3       Arterial Line 08/02/22 Left Radial (Active)   Number of days: 3       Surgical Incision Buttock (Active)   Number of days: 3       Surgical Incision Abdomen (Active)   Number of days: 2       Surgical Incision Perineum (Active)   Number of days: 2       Catheterization Site - Venous Left Internal Jugular 7 Fr. (Active)   Number of days: 1       Labs:    CBC Results       08/05/22 08/04/22 08/03/22     0446 0504 2200    WBC 16.59 20.68 23.29    RBC 3.29 3.25 3.47    HGB 11.6 11.5 12.4    HCT 34.5 33.6 36.2    .9 103.4 104.3    MCH 35.3 35.4 35.7    MCHC 33.6 34.2 34.3     137 151        CMP Results       08/05/22 08/04/22 08/04/22     0446 2133 0504     139 138    K 4.0 3.7 3.6    Cl 106 106 107    CO2 22 22 19    Glucose 189 192 226    BUN 44 42 36    Creatinine 1.5 1.4 1.3    Calcium 7.6 7.7 7.7    Anion Gap 13 11  12    AST 48 -- 52    ALT 26 -- 27    Albumin 2.1 -- 2.2    EGFR 44.0 47.6 51.9            Micro:  Microbiology Results     Procedure Component Value Units Date/Time    MRSA Screen, Nares Only Nose [[id number]]  (Normal) Collected: 08/02/22 2248    Specimen: Nasal Swab from Nose Updated: 08/03/22 0030     MRSA DNA, Nares Negative    SARS-CoV-2 (COVID-19), PCR Nasopharynx [[id number]]  (Normal) Collected: 08/02/22 1633    Specimen: Nasopharyngeal Swab from Nasopharynx Updated: 08/02/22 1807    Narrative:      The following orders were created for panel order SARS-CoV-2 (COVID-19), PCR Nasopharynx.  Procedure                               Abnormality         Status                     ---------                               -----------         ------                     SARS-CoV-2 (COVID-19), P...[[id number]]  Normal              Final result                 Please view results for these tests on the individual orders.    SARS-CoV-2 (COVID-19), PCR Nasopharynx [[id number]]  (Normal) Collected: 08/02/22 1633    Specimen: Nasopharyngeal Swab from Nasopharynx Updated: 08/02/22 1807     SARS-CoV-2 (COVID-19) Negative    Narrative:      Testing performed using real-time PCR for detection of COVID-19. EUA approved validation studies performed on site.     Blood Culture Blood, Venous [[id number]]  (Normal) Collected: 08/02/22 1628    Specimen: Blood, Venous Updated: 08/04/22 1901     Culture No growth at 48 hours    Blood Culture Blood, Venous [[id number]]  (Normal) Collected: 08/02/22 1628    Specimen: Blood, Venous Updated: 08/04/22 1901     Culture No growth at 48 hours    Anaerobic Culture / Smear (includes Aerobic Culture) [[id number]]  (Abnormal)  (Susceptibility) Collected: 08/02/22 1536    Specimen: Abscess Fluid from Buttock, Left Updated: 08/04/22 1330     Culture **Positive Culture**      2+ Escherichia coli      2+ Streptococcus anginosus      2+ Enterobacter cloacae Complex     Gram Stain Result 3+ WBC      2+ Gram  positive cocci      2+ Gram positive bacilli    Narrative:      ISOLATION IN PROGRESS      SARS-CoV-2 (COVID-19), PCR Nasopharynx [[id number]]  (Normal) Collected: 07/25/22 1341    Specimen: Nasopharyngeal Swab from Nasopharynx Updated: 07/25/22 1525    Narrative:      The following orders were created for panel order SARS-CoV-2 (COVID-19), PCR Nasopharynx.  Procedure                               Abnormality         Status                     ---------                               -----------         ------                     SARS-CoV-2 (COVID-19), P...[[id number]]  Normal              Final result                 Please view results for these tests on the individual orders.    SARS-CoV-2 (COVID-19), PCR Nasopharynx [[id number]]  (Normal) Collected: 07/25/22 1341    Specimen: Nasopharyngeal Swab from Nasopharynx Updated: 07/25/22 1525     SARS-CoV-2 (COVID-19) Negative    SARS-CoV-2 (COVID-19), PCR Nasopharynx [[id number]]  (Normal) Collected: 07/22/22 2001    Specimen: Nasopharyngeal Swab from Nasopharynx Updated: 07/22/22 2125    Narrative:      The following orders were created for panel order SARS-CoV-2 (COVID-19), PCR Nasopharynx.  Procedure                               Abnormality         Status                     ---------                               -----------         ------                     SARS-CoV-2 (COVID-19), P...[[id number]]  Normal              Final result                 Please view results for these tests on the individual orders.    SARS-CoV-2 (COVID-19), PCR Nasopharynx [[id number]]  (Normal) Collected: 07/22/22 2001    Specimen: Nasopharyngeal Swab from Nasopharynx Updated: 07/22/22 2125     SARS-CoV-2 (COVID-19) Negative    Narrative:      Testing performed using real-time PCR for detection of COVID-19. EUA approved validation studies performed on site.               Imaging:    Radiology Imaging    XR CHEST 1 VW    Narrative  CLINICAL HISTORY: Intubation    COMMENT: Portable semierect  frontal view of the chest is compared to chest x-ray  the prior day.  Endotracheal tube appears similarly positioned.  Other lines and  catheters appears similar to the extent visualized.  Stable prominence the  cardiac silhouette.  Stable pattern of probable bilateral effusions/lower lobe  atelectasis, right greater than left.    --    Impression  Stable chest      Assessment     89 years old male with history of CHF, A. fib not on anticoagulation due to prior history of GI bleed, tricuspid regurgitation, pulmonary hypertension, coronary disease, who was recently discharged from hospital after he presented with slurred speech and hypotension.  Work-up for CVA was negative and it was concluded that he was suffering from orthostatic hypotension.  Medications were adjusted and he was discharged to rehabilitation facility where he developed buttocks and perineum pain.  He was noted to have necrotizing changes during surgical consultation and admitted to hospital for surgical debridement.  Intraoperatively, significant necrotizing process noted.  Multiple cultures were obtained showing polymicrobial process.  Taken for second look without evidence of additional necrotizing changes.      1.  Justin's gangrene  2.  CHF, pulmonary edema  3.  Medical therapy with Jardiance- unclear if for coronary artery disease/CHF management or hyperglycemia.  4.  Septic shock  5.  Estimated Creatinine Clearance: 37 mL/min (A) (by C-G formula based on SCr of 1.5 mg/dL (H)).        Plan   1.  Recommend to continue therapy with clindamycin, discontinue vancomycin and Zosyn and transition to cefepime renally dosed at 2 g every 12 hours.  2.  Close follow-up of suprapubic and penile area recommended.  Edema and blister formation could be related to efforts, surgical trauma, progression of infectious process.  3.  Case discussed with ICU team.  4.  We will follow with you.    More than 35 minutes were spent obtaining a detailed interval  history, detailed exam, evaluating this high complexity case with significant medical decision making including continuation of antibiotic therapy, coordination of care with primary team and consultants.      KEN BARNETT MD  INFECTIOUS DISEASES    NOTE: Some or all of the note above was created with the use of dictation software, please note this dictation software can have anomalies in its ability to transcribe. Please contact me directly for anything that seems abnormal for clarification.

## 2022-08-05 NOTE — PROGRESS NOTES
Cardiology Progress Note   [facility]    [facility]  [facility] [address]  [address] [place] [address]    TEL  [phone]  [facility]       Patient: Samy Elena  MRN: [id number]  : 10/6/1930  Admission Date: 2022   Consult Date: 22  Reason for Consult: H/o HFrEF, PH, atrial fibrillation -postop management   Outpatient Cardiologist: Dr. Yovanny Cruz ( office visit 2022)     Interval History:   Seen and examined this am.   He opens his eyes and appears awake.     He is currently on norepi 6, vasopresin 0.03 and dobutamine 5  CVP 12   PCWP 18    He remains net positive and hypervolemic on exam     HPI:   Samy Elena is a 89 y.o. 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    Cardiology is being consulted for management recommendations given his extensive cardiac history.     Her chart review, the patient had a recent admission at the end of July with changes in speech. CVA was ruled out at the time of that admission .symptoms was attributed to  postural/orthostatic changes related to symptomatic hypotension. Patient was discharged to rehabilitation unit where he developed  buttocks and pubic pain.He was referred to be seen by surgery as outpatient that lead to current admission.       Past Medical History:   Diagnosis Date   • Atrial fibrillation (CMS/HCC)    • Disease of thyroid gland    • GI (gastrointestinal bleed)    • Hypertension    • Myocardial infarction (CMS/HCC)           Past Surgical History:   Procedure Laterality Date   • BYPASS GRAFT         Social History     Socioeconomic History   • Marital status:      Spouse name: Not on file   • Number of children: Not on file   • Years of education: Not on file   •  Highest education level: Not on file   Occupational History   • Not on file   Tobacco Use   • Smoking status: Never Smoker   • Smokeless tobacco: Never Used   Substance and Sexual Activity   • Alcohol use: Not on file   • Drug use: Not on file   • Sexual activity: Not on file   Other Topics Concern   • Not on file   Social History Narrative   • Not on file     Social Determinants of Health     Financial Resource Strain: Not on file   Food Insecurity: No Food Insecurity   • Worried About Running Out of Food in the Last Year: Never true   • Ran Out of Food in the Last Year: Never true   Transportation Needs: Not on file   Physical Activity: Not on file   Stress: Not on file   Social Connections: Not on file   Intimate Partner Violence: Not on file   Housing Stability: Not on file        History reviewed. No pertinent family history.     Jardiance [empagliflozin]    Current Outpatient Medications   Medication Instructions   • acetaminophen (TYLENOL) 325 mg tablet oral   • bisacodyL (DULCOLAX) 10 mg suppository rectal   • clopidogreL (PLAVIX) 75 mg, oral, Daily   • coenzyme Q10 (COQ10) 100 mg, oral, Daily   • empagliflozin (JARDIANCE) 10 mg, oral, Daily   • levothyroxine (SYNTHROID) 100 mcg, oral, Daily (6:30a)   • metoprolol succinate XL (TOPROL-XL) 100 mg, oral, Daily   • multivitamin (THERAGRAN) tablet oral, Daily   • sacubitriL-valsartan (ENTRESTO) 49-51 mg per tablet 1 tablet, oral, 2 times daily   • sildenafiL (pulm.hypertension) (REVATIO) 20 mg, oral, 3 times daily   • simvastatin (ZOCOR) 10 mg, oral, Nightly   • spironolactone (ALDACTONE) 25 mg, oral, Daily   • torsemide (DEMADEX) 10-20 mg, oral, Daily, 10 mg if weight is 176-181 lbs, 20 mg if weight is >181 lbs       Scheduled Meds:  • cefepime  2 g intravenous q12h INT   • chlorhexidine  15 mL Mouth/Throat 2 times per day   • clindamycin  900 mg intravenous q8h INT   • collagenase  1 application Topical Daily   • furosemide  40 mg intravenous Once   • heparin  "(porcine)  5,000 Units subcutaneous q8h IVÁN   • insulin aspart U-100  3-5 Units subcutaneous q6h INT   • metoprolol  2.5 mg intravenous q6h INT   • pantoprazole  40 mg intravenous q24h   • sodium hypochlorite   Topical Daily     Continuous Infusions:  • dexmedetomidine in 0.9 % NaCl  0.2-1.5 mcg/kg/hr Stopped (08/03/22 0608)   • DOBUTamine  5 mcg/kg/min 5 mcg/kg/min (08/05/22 1100)   • fentaNYL  0-200 mcg/hr 25 mcg/hr (08/05/22 1100)   • norepinephrine  0.5-90 mcg/min 5 mcg/min (08/05/22 1100)   • vasopressin (VASOSTRICT) continuous infusion (mixture)  0.03 Units/min Stopped (08/05/22 0836)     PRN Meds:.•  glucose **OR** dextrose **OR** glucagon **OR** dextrose 50 % in water (D50)  •  glucose **OR** dextrose **OR** glucagon **OR** dextrose 50 % in water (D50)  •  fentaNYL **AND** fentaNYL  •  HYDROmorphone      Intake/Output Summary (Last 24 hours) at 8/5/2022 1125  Last data filed at 8/5/2022 1100  Gross per 24 hour   Intake 2032.16 ml   Output 1709 ml   Net 323.16 ml        Weights (last 7 days)     Date/Time Weight Drug Calculation Weight (Dosing Weight)    08/03/22 0944 80.3 kg (177 lb) --    08/03/22 0300 -- 80.7 kg (177 lb 14.6 oz)    08/02/22 2334 80.7 kg (177 lb 14.6 oz) --    08/02/22 1610 80.7 kg (178 lb) --           Wt Readings from Last 3 Encounters:   08/03/22 80.3 kg (177 lb)   08/02/22 80.3 kg (177 lb)   07/25/22 81.1 kg (178 lb 11.2 oz)        REVIEW OF SYSTEMS:    A complete review of systems limited by sedation.    PHYSICAL EXAMINATION:  Visit Vitals  BP (!) 105/57   Pulse (!) 101   Temp 37.1 °C (98.8 °F) (Bladder)   Resp (!) 10   Ht 1.854 m (6' 1\")   Wt 80.3 kg (177 lb)   SpO2 98%   BMI 23.35 kg/m²     Body surface area is 2.03 meters squared.  Body mass index is 23.35 kg/m².  General: intubated and sedated   HEENT: No corneal arcus or xanthelasmas.  Sclerae are anicteric.   Neck: CVC on the right. cvp 12  Heart: s1s2 audible, no significant murmurs   Chest: Symmetrical.  Lungs: intubated. " Breathing sounds audible bl .  Abdomen: soft, nt   Extremities: No cyanosis or clubbing.trace-1  lower extremity edema.  Distal pulses are easily palpable.  Skin: Warm and dry and well perfused.  Neurologic:  na  Psychiatric:na    Labs   Results from last 7 days   Lab Units 08/05/22  0446 08/04/22  2133 08/04/22  0504 08/03/22  2200 08/03/22  0435   SODIUM mEQ/L 141 139 138   < > 135*   POTASSIUM mEQ/L 4.0 3.7 3.6   < > 4.3   CHLORIDE mEQ/L 106 106 107   < > 110*   CO2 mEQ/L 22 22 19*   < > 18*   BUN mg/dL 44* 42* 36*   < > 28*   CREATININE mg/dL 1.5* 1.4* 1.3   < > 1.1   CALCIUM mg/dL 7.6* 7.7* 7.7*   < > 8.3*   ALBUMIN g/dL 2.1*  --  2.2*  --  2.3*   BILIRUBIN TOTAL mg/dL 1.9*  --  2.0*  --  2.6*   ALK PHOS IU/L 68  --  69  --  101   ALT IU/L 26  --  27  --  32   AST IU/L 48*  --  52*  --  72*   GLUCOSE mg/dL 189* 192* 226*   < > 182*    < > = values in this interval not displayed.       No results found for: HSTROPONINI    Results from last 7 days   Lab Units 08/05/22  0446 08/04/22  0504 08/03/22  2200   WBC K/uL 16.59* 20.68* 23.29*   HEMOGLOBIN g/dL 11.6* 11.5* 12.4*   HEMATOCRIT % 34.5* 33.6* 36.2*   PLATELETS K/uL 114* 137* 151       Lab Results   Component Value Date    CHOL 94 07/23/2022    HDL 23 (L) 07/23/2022    LDLCALC 63 07/23/2022    TRIG 41 07/23/2022    TSH 4.72 08/01/2022    HGBA1C 6.3 (H) 07/23/2022       Outside records reviewed..    Imaging  CXR reviewed     Cardiac Studies  TTE 04/13/2022 ( outside report , images not available)     •  A complete transthoracic echocardiogram (including 2D, color flow   Doppler, spectral Doppler and M-mode imaging) was performed using the   standard protocol. The study quality was adequate and poor.  •  The left ventricle is normal in size. Endocardium is incompletely   visualized and segmental wall motion abnormalities cannot be excluded.   Moderately decreased left ventricular ejection fraction. The left   ventricular ejection fraction by visual estimate is  35% to 40%.  •  There is indeterminate diastolic function.  •  The right ventricle is severely dilated. There is moderately to   severely decreased systolic function of the right ventricle.  •  The left atrium is moderately dilated.  •  The right atrium is severely dilated.  •  There is trivial mitral valve leaflet thickening. There is trace mitral   regurgitation. There is no mitral stenosis.  •  The aortic valve is trileaflet. There is mild aortic valve thickening.   There is mild aortic valve calcification. There is no aortic stenosis.   There is mild aortic regurgitation.  •  There is moderate to severe tricuspid regurgitation.  •  The inferior vena cava is dilated (diameter >21 mm) and decreases <50%   in size with inspiration, suggesting an elevated right atrial pressure of   15 mmHg (range 10-20 mm Hg).  •  There is no prior study available for comparison at this organization.    Left Ventricle  The left ventricle is normal in size. Endocardium is incompletely visualized and segmental wall motion abnormalities cannot be excluded. The left ventricular ejection fraction by visual estimate is 35% to 40%. Moderately decreased left ventricular ejection fraction. There is indeterminate diastolic function.    Right Ventricle  The right ventricle is severely dilated. Off axis imaging of the right ventricle, but visually systolic function appears moderate to severely reduced.    Left Atrium  The left atrium is moderately dilated.    Right Atrium  The right atrium is severely dilated.    IVC/SVC  The inferior vena cava is dilated (diameter >21 mm) and decreases <50% in size with inspiration, suggesting an elevated right atrial pressure of 15 mmHg (range 10-20 mm Hg).    Mitral Valve  There is trivial mitral valve leaflet thickening. There is no mitral stenosis. There is trace mitral regurgitation.    Tricuspid Valve  There is moderate tricuspid annular dilatation. There is no tricuspid stenosis. There is moderate to  severe tricuspid regurgitation.    Aortic Valve  The aortic valve is trileaflet. There is mild aortic valve thickening. There is mild aortic valve calcification. There is no aortic stenosis. There is mild aortic regurgitation.    Pulmonic Valve  There is no pulmonic stenosis. There is mild pulmonic valve regurgitation.    Pericardium  There are echocardiographic findings consistent with fat pad.    Aortic Arch/Descending/Abdominal Aorta  The aortic root is normal in size. The proximal segment of the ascending aorta is normal in size.     ECG   07/22/2022   Atrial fibrillation   rbbb   twi on precordial leads, present on ecg 07/2022     Telemetry  Atrial fibrillation , rbbb with ventricular response in 60-80 range over the past 12 hours     Assessment:  This is a 89 y.o. male being consulted for cardiovascular management    #Justin's gangrene:   Management per primary service   On abx; id has been following   empagliflozin should be discontinued at the time of discharge. He was recently started on this agent in may 2022      # iCM:  # HFrEF:  # PH on sildenafil as outpatient:    NYHA class III and he has ACC/AHA Stage C as outpatient previously       CVP 12  PCWP 18-19      His lvef appears 40-45% with severe RV dilation and diastolic dysfunction.     He is on norepinephrine 6, vasopressin and dobutamine 5      Please administer IV furosemide 40 mg tid today to aim for net negative  Strict intake and output   Continue holding entresto spironolactone and sildenafil while on vasopressor support and npo; pending further course   Please continue transducing cvp        # Permanent atrial fibrillation:   [id number]     Not on AC as outpatient given history of GIB   He is on IV metoprolol 2.5 mg q6h     Case  discussed with Dr. Ramirez and with the primary team. Final recommendations are pending attending co-signature. Please page Cardiology at [phone] with any questions.     Sabiha Cordon MD  8/5/2022

## 2022-08-05 NOTE — PLAN OF CARE
Problem: Adult Inpatient Plan of Care  Goal: Plan of Care Review  Outcome: Progressing  Flowsheets (Taken 8/5/2022 1113)  Progress: improving  Plan of Care Reviewed With: patient  Outcome Summary: Per CPR, Pt s/p 8/3 OR for diverting sigmoid colostomy and washout. Remains intubated,  on vaso, levo and dobutamine, sedated on precedex and fentanyl. Status post right heart cath and [place] placement. YAHIR unknown. CM will follow and assist as needed.

## 2022-08-05 NOTE — PLAN OF CARE
Problem: Adult Inpatient Plan of Care  Goal: Plan of Care Review  Outcome: Progressing  Flowsheets (Taken 8/5/2022 0516)  Progress: improving  Plan of Care Reviewed With: family  Outcome Summary: VSS. Levo titrated as tolerated. [place] in place. Pt alert and calm. Adequate UOP. Perineal dressing changed per order.   Plan of Care Review  Plan of Care Reviewed With: family  Progress: improving  Outcome Summary: VSS. Levo titrated as tolerated. [place] in place. Pt alert and calm. Adequate UOP. Perineal dressing changed per order.

## 2022-08-05 NOTE — PATIENT CARE CONFERENCE
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

## 2022-08-06 ENCOUNTER — APPOINTMENT (INPATIENT)
Dept: RADIOLOGY | Facility: HOSPITAL | Age: 87
DRG: 717 | End: 2022-08-06
Attending: SURGERY
Payer: MEDICARE

## 2022-08-06 LAB
ALBUMIN SERPL-MCNC: 2.1 G/DL (ref 3.4–5)
ALP SERPL-CCNC: 66 IU/L (ref 35–126)
ALT SERPL-CCNC: 20 IU/L (ref 16–63)
ANION GAP SERPL CALC-SCNC: 12 MEQ/L (ref 3–15)
AST SERPL-CCNC: 36 IU/L (ref 15–41)
BASE EXCESS BLDA CALC-SCNC: -1.3 MEQ/L
BASE EXCESS BLDA CALC-SCNC: -1.7 MEQ/L
BILIRUB SERPL-MCNC: 1.7 MG/DL (ref 0.3–1.2)
BUN SERPL-MCNC: 50 MG/DL (ref 8–20)
CA-I BLD-SCNC: 1.06 MMOL/L (ref 1.15–1.27)
CA-I BLD-SCNC: 1.11 MMOL/L (ref 1.15–1.27)
CA-I BLD-SCNC: 1.12 MMOL/L (ref 1.15–1.27)
CALCIUM SERPL-MCNC: 7.7 MG/DL (ref 8.9–10.3)
CHLORIDE BLDA-SCNC: 112 MEQ/L (ref 98–109)
CHLORIDE BLDA-SCNC: 115 MEQ/L (ref 98–109)
CHLORIDE SERPL-SCNC: 108 MEQ/L (ref 98–109)
CO2 BLDA-SCNC: 23.8 MEQ/L (ref 22–32)
CO2 BLDA-SCNC: 24.2 MEQ/L (ref 22–32)
CO2 SERPL-SCNC: 24 MEQ/L (ref 22–32)
COHGB MFR BLD: 1.5 %
COHGB MFR BLD: 1.6 %
CREAT SERPL-MCNC: 1.3 MG/DL (ref 0.8–1.3)
CROSSMATCH: NORMAL
ERYTHROCYTE [DISTWIDTH] IN BLOOD BY AUTOMATED COUNT: 15 % (ref 11.6–14.4)
FIO2 ON VENT: 40 %
FIO2 ON VENT: ABNORMAL %
GFR SERPL CREATININE-BSD FRML MDRD: 51.9 ML/MIN/1.73M*2
GLUCOSE BLD-MCNC: 170 MG/DL (ref 70–99)
GLUCOSE BLD-MCNC: 185 MG/DL (ref 70–99)
GLUCOSE BLD-MCNC: 191 MG/DL (ref 70–99)
GLUCOSE BLDA-MCNC: 187 MG/DL (ref 70–99)
GLUCOSE BLDA-MCNC: 197 MG/DL (ref 70–99)
GLUCOSE SERPL-MCNC: 199 MG/DL (ref 70–99)
HCO3 BLDA-SCNC: 23.6 MEQ/L (ref 21–28)
HCO3 BLDA-SCNC: 23.9 MEQ/L (ref 21–28)
HCT VFR BLDCO AUTO: 34.5 % (ref 40.1–51)
HGB BLD-MCNC: 11.7 G/DL (ref 13.7–17.5)
HGB BLDA OXIMETRY-MCNC: 11.5 G/DL (ref 14–17.5)
HGB BLDA OXIMETRY-MCNC: 12.2 G/DL (ref 14–17.5)
INHALED O2 CONCENTRATION: ABNORMAL %
INHALED O2 CONCENTRATION: ABNORMAL %
INR PPP: 1.5
ISBT CODE: 9500
LACTATE BLDA-SCNC: 1 MMOL/L (ref 0.4–1.6)
LACTATE BLDA-SCNC: 1.3 MMOL/L (ref 0.4–1.6)
LACTATE SERPL-SCNC: 1.2 MMOL/L (ref 0.4–2)
LACTATE SERPL-SCNC: 1.3 MMOL/L (ref 0.4–2)
MAGNESIUM SERPL-MCNC: 2.2 MG/DL (ref 1.8–2.5)
MCH RBC QN AUTO: 35.2 PG (ref 28–33.2)
MCHC RBC AUTO-ENTMCNC: 33.9 G/DL (ref 32.2–36.5)
MCV RBC AUTO: 103.9 FL (ref 83–98)
METHGB BLD-SCNC: 0.5 % (ref 0.4–1.5)
METHGB BLD-SCNC: 0.9 % (ref 0.4–1.5)
PCO2 BLDA: 35 MM HG (ref 35–48)
PCO2 BLDA: 38 MM HG (ref 35–48)
PDW BLD AUTO: 10.8 FL (ref 9.4–12.4)
PH BLDA: 7.39 [PH] (ref 7.35–7.45)
PH BLDA: 7.42 [PH] (ref 7.35–7.45)
PHOSPHATE SERPL-MCNC: 2.5 MG/DL (ref 2.4–4.7)
PLATELET # BLD AUTO: 84 K/UL (ref 150–350)
PO2 BLDA: 212 MM HG (ref 83–100)
PO2 BLDA: 249 MM HG (ref 83–100)
POCT TEST: ABNORMAL
POTASSIUM BLDA-SCNC: 3.4 MEQ/L (ref 3.4–4.5)
POTASSIUM BLDA-SCNC: 3.6 MEQ/L (ref 3.4–4.5)
POTASSIUM SERPL-SCNC: 3.6 MEQ/L (ref 3.6–5.1)
PRODUCT CODE: NORMAL
PRODUCT STATUS: NORMAL
PROT SERPL-MCNC: 5.4 G/DL (ref 6–8.2)
PROTHROMBIN TIME: 17.8 SEC (ref 12.2–14.5)
RBC # BLD AUTO: 3.32 M/UL (ref 4.5–5.8)
SAO2 % BLDA: 98 % (ref 93–98)
SAO2 % BLDA: 98 % (ref 93–98)
SAO2 % BLDV: 73 % (ref 30–60)
SAO2 % BLDV: 78 % (ref 30–60)
SODIUM BLDA-SCNC: 142 MEQ/L (ref 136–145)
SODIUM BLDA-SCNC: 142 MEQ/L (ref 136–145)
SODIUM SERPL-SCNC: 144 MEQ/L (ref 136–144)
SPECIMEN EXP DATE BLD: NORMAL
UNIT ABO: NORMAL
UNIT ID: NORMAL
UNIT RH: NEGATIVE
WBC # BLD AUTO: 14.65 K/UL (ref 3.8–10.5)

## 2022-08-06 PROCEDURE — 83735 ASSAY OF MAGNESIUM: CPT | Performed by: SURGERY

## 2022-08-06 PROCEDURE — 86022 PLATELET ANTIBODIES: CPT | Performed by: STUDENT IN AN ORGANIZED HEALTH CARE EDUCATION/TRAINING PROGRAM

## 2022-08-06 PROCEDURE — 63600000 HC DRUGS/DETAIL CODE: Performed by: SURGERY

## 2022-08-06 PROCEDURE — 25000000 HC PHARMACY GENERAL: Performed by: SURGERY

## 2022-08-06 PROCEDURE — 25800000 HC PHARMACY IV SOLUTIONS: Performed by: SURGERY

## 2022-08-06 PROCEDURE — 99291 CRITICAL CARE FIRST HOUR: CPT | Performed by: SURGERY

## 2022-08-06 PROCEDURE — 82330 ASSAY OF CALCIUM: CPT | Performed by: SURGERY

## 2022-08-06 PROCEDURE — 25000000 HC PHARMACY GENERAL

## 2022-08-06 PROCEDURE — 84100 ASSAY OF PHOSPHORUS: CPT | Performed by: SURGERY

## 2022-08-06 PROCEDURE — 63600000 HC DRUGS/DETAIL CODE: Performed by: STUDENT IN AN ORGANIZED HEALTH CARE EDUCATION/TRAINING PROGRAM

## 2022-08-06 PROCEDURE — 20000000 HC ROOM AND CARE ICU

## 2022-08-06 PROCEDURE — 80053 COMPREHEN METABOLIC PANEL: CPT | Performed by: SURGERY

## 2022-08-06 PROCEDURE — 36415 COLL VENOUS BLD VENIPUNCTURE: CPT | Performed by: SURGERY

## 2022-08-06 PROCEDURE — 99233 SBSQ HOSP IP/OBS HIGH 50: CPT | Performed by: INTERNAL MEDICINE

## 2022-08-06 PROCEDURE — 94003 VENT MGMT INPAT SUBQ DAY: CPT

## 2022-08-06 PROCEDURE — 25800000 HC PHARMACY IV SOLUTIONS

## 2022-08-06 PROCEDURE — 71045 X-RAY EXAM CHEST 1 VIEW: CPT

## 2022-08-06 PROCEDURE — 63600000 HC DRUGS/DETAIL CODE

## 2022-08-06 PROCEDURE — 85610 PROTHROMBIN TIME: CPT | Performed by: SURGERY

## 2022-08-06 PROCEDURE — 85027 COMPLETE CBC AUTOMATED: CPT | Performed by: SURGERY

## 2022-08-06 PROCEDURE — 25800000 HC PHARMACY IV SOLUTIONS: Performed by: STUDENT IN AN ORGANIZED HEALTH CARE EDUCATION/TRAINING PROGRAM

## 2022-08-06 PROCEDURE — 84132 ASSAY OF SERUM POTASSIUM: CPT | Performed by: STUDENT IN AN ORGANIZED HEALTH CARE EDUCATION/TRAINING PROGRAM

## 2022-08-06 PROCEDURE — 82810 BLOOD GASES O2 SAT ONLY: CPT

## 2022-08-06 PROCEDURE — 82805 BLOOD GASES W/O2 SATURATION: CPT | Performed by: SURGERY

## 2022-08-06 PROCEDURE — 80048 BASIC METABOLIC PNL TOTAL CA: CPT | Performed by: STUDENT IN AN ORGANIZED HEALTH CARE EDUCATION/TRAINING PROGRAM

## 2022-08-06 PROCEDURE — 83605 ASSAY OF LACTIC ACID: CPT | Performed by: SURGERY

## 2022-08-06 RX ORDER — POTASSIUM CHLORIDE 14.9 MG/ML
20 INJECTION INTRAVENOUS ONCE
Status: COMPLETED | OUTPATIENT
Start: 2022-08-06 | End: 2022-08-06

## 2022-08-06 RX ORDER — FUROSEMIDE 10 MG/ML
40 INJECTION INTRAMUSCULAR; INTRAVENOUS EVERY 8 HOURS
Status: DISCONTINUED | OUTPATIENT
Start: 2022-08-06 | End: 2022-08-07

## 2022-08-06 RX ORDER — FUROSEMIDE 10 MG/ML
40 INJECTION INTRAMUSCULAR; INTRAVENOUS ONCE
Status: COMPLETED | OUTPATIENT
Start: 2022-08-06 | End: 2022-08-06

## 2022-08-06 RX ADMIN — POTASSIUM PHOSPHATE, MONOBASIC POTASSIUM PHOSPHATE, DIBASIC 15 MMOL: 224; 236 INJECTION, SOLUTION, CONCENTRATE INTRAVENOUS at 11:28

## 2022-08-06 RX ADMIN — PANTOPRAZOLE SODIUM 40 MG: 40 INJECTION, POWDER, FOR SOLUTION INTRAVENOUS at 08:03

## 2022-08-06 RX ADMIN — METOPROLOL TARTRATE 2.5 MG: 5 INJECTION, SOLUTION INTRAVENOUS at 07:05

## 2022-08-06 RX ADMIN — INSULIN ASPART 3 UNITS: 100 INJECTION, SOLUTION INTRAVENOUS; SUBCUTANEOUS at 17:38

## 2022-08-06 RX ADMIN — CHLORHEXIDINE GLUCONATE 15 ML: 1.2 SOLUTION ORAL at 09:00

## 2022-08-06 RX ADMIN — METOPROLOL TARTRATE 2.5 MG: 5 INJECTION, SOLUTION INTRAVENOUS at 17:40

## 2022-08-06 RX ADMIN — FUROSEMIDE 40 MG: 10 INJECTION, SOLUTION INTRAMUSCULAR; INTRAVENOUS at 21:24

## 2022-08-06 RX ADMIN — CLINDAMYCIN PHOSPHATE 900 MG: 900 INJECTION, SOLUTION INTRAVENOUS at 22:33

## 2022-08-06 RX ADMIN — HEPARIN SODIUM 5000 UNITS: 5000 INJECTION, SOLUTION INTRAVENOUS; SUBCUTANEOUS at 13:28

## 2022-08-06 RX ADMIN — FUROSEMIDE 40 MG: 10 INJECTION, SOLUTION INTRAMUSCULAR; INTRAVENOUS at 15:57

## 2022-08-06 RX ADMIN — DEXMEDETOMIDINE 0.2 MCG/KG/HR: 200 INJECTION, SOLUTION INTRAVENOUS at 18:51

## 2022-08-06 RX ADMIN — POTASSIUM CHLORIDE 20 MEQ: 14.9 INJECTION, SOLUTION INTRAVENOUS at 13:33

## 2022-08-06 RX ADMIN — METOPROLOL TARTRATE 2.5 MG: 5 INJECTION, SOLUTION INTRAVENOUS at 11:40

## 2022-08-06 RX ADMIN — INSULIN ASPART 3 UNITS: 100 INJECTION, SOLUTION INTRAVENOUS; SUBCUTANEOUS at 11:41

## 2022-08-06 RX ADMIN — DOBUTAMINE 5 MCG/KG/MIN: 12.5 INJECTION, SOLUTION, CONCENTRATE INTRAVENOUS at 19:20

## 2022-08-06 RX ADMIN — FUROSEMIDE 40 MG: 10 INJECTION, SOLUTION INTRAMUSCULAR; INTRAVENOUS at 10:12

## 2022-08-06 RX ADMIN — METOPROLOL TARTRATE 2.5 MG: 5 INJECTION, SOLUTION INTRAVENOUS at 00:16

## 2022-08-06 RX ADMIN — CLINDAMYCIN PHOSPHATE 900 MG: 900 INJECTION, SOLUTION INTRAVENOUS at 15:57

## 2022-08-06 RX ADMIN — CHLORHEXIDINE GLUCONATE 15 ML: 1.2 SOLUTION ORAL at 21:25

## 2022-08-06 RX ADMIN — CEFEPIME 2 G: 2 INJECTION, POWDER, FOR SOLUTION INTRAVENOUS at 23:35

## 2022-08-06 RX ADMIN — CEFEPIME 2 G: 2 INJECTION, POWDER, FOR SOLUTION INTRAVENOUS at 11:40

## 2022-08-06 RX ADMIN — Medication 4 MCG/MIN: at 14:22

## 2022-08-06 RX ADMIN — CLINDAMYCIN PHOSPHATE 900 MG: 900 INJECTION, SOLUTION INTRAVENOUS at 07:05

## 2022-08-06 RX ADMIN — CEFEPIME 2 G: 2 INJECTION, POWDER, FOR SOLUTION INTRAVENOUS at 00:15

## 2022-08-06 RX ADMIN — DAKIN'S SOLUTION 0.125% (QUARTER STRENGTH): 0.12 SOLUTION at 10:45

## 2022-08-06 RX ADMIN — HEPARIN SODIUM 5000 UNITS: 5000 INJECTION, SOLUTION INTRAVENOUS; SUBCUTANEOUS at 21:24

## 2022-08-06 RX ADMIN — HYDROMORPHONE HYDROCHLORIDE 0.5 MG: 1 INJECTION, SOLUTION INTRAMUSCULAR; INTRAVENOUS; SUBCUTANEOUS at 21:24

## 2022-08-06 RX ADMIN — POTASSIUM CHLORIDE 20 MEQ: 14.9 INJECTION, SOLUTION INTRAVENOUS at 09:21

## 2022-08-06 RX ADMIN — Medication 8 MCG/MIN: at 01:16

## 2022-08-06 RX ADMIN — HEPARIN SODIUM 5000 UNITS: 5000 INJECTION, SOLUTION INTRAVENOUS; SUBCUTANEOUS at 07:05

## 2022-08-06 RX ADMIN — METOPROLOL TARTRATE 2.5 MG: 5 INJECTION, SOLUTION INTRAVENOUS at 23:36

## 2022-08-06 RX ADMIN — COLLAGENASE SANTYL 1 APPLICATION.: 250 OINTMENT TOPICAL at 10:45

## 2022-08-06 NOTE — PROGRESS NOTES
Cardiology Progress Note   [facility]    [facility]  [facility] [address] [facility]  [address] [id number]    TEL  [phone]  [facility]       Patient: Samy Elena  MRN: [id number]  : 10/6/1930  Admission Date: 2022   Consult Date: 22  Reason for Consult: H/o HFrEF, PH, atrial fibrillation -postop management   Outpatient Cardiologist: Dr. Yovanny Cruz ( office visit 2022)     Interval History:   Seen and examined this am.   He opens his eyes and appears awake.     He is currently on norepi 6 and dobutamine 5  CVP  8  PA mean 23    He remains net positive     HPI:   Samy Elena is a 89 y.o. 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    Cardiology is being consulted for management recommendations given his extensive cardiac history.     Her chart review, the patient had a recent admission at the end of July with changes in speech. CVA was ruled out at the time of that admission .symptoms was attributed to  postural/orthostatic changes related to symptomatic hypotension. Patient was discharged to rehabilitation unit where he developed  buttocks and pubic pain.He was referred to be seen by surgery as outpatient that lead to current admission.       Past Medical History:   Diagnosis Date   • Atrial fibrillation (CMS/HCC)    • Disease of thyroid gland    • GI (gastrointestinal bleed)    • Hypertension    • Myocardial infarction (CMS/HCC)           Past Surgical History:   Procedure Laterality Date   • BYPASS GRAFT         Social History     Socioeconomic History   • Marital status:      Spouse name: Not on file   • Number of children: Not on file   • Years of education: Not on file   • Highest education level: Not on file    Occupational History   • Not on file   Tobacco Use   • Smoking status: Never Smoker   • Smokeless tobacco: Never Used   Substance and Sexual Activity   • Alcohol use: Not on file   • Drug use: Not on file   • Sexual activity: Not on file   Other Topics Concern   • Not on file   Social History Narrative   • Not on file     Social Determinants of Health     Financial Resource Strain: Not on file   Food Insecurity: No Food Insecurity   • Worried About Running Out of Food in the Last Year: Never true   • Ran Out of Food in the Last Year: Never true   Transportation Needs: Not on file   Physical Activity: Not on file   Stress: Not on file   Social Connections: Not on file   Intimate Partner Violence: Not on file   Housing Stability: Not on file        History reviewed. No pertinent family history.     Jardiance [empagliflozin]    Current Outpatient Medications   Medication Instructions   • acetaminophen (TYLENOL) 325 mg tablet oral   • bisacodyL (DULCOLAX) 10 mg suppository rectal   • clopidogreL (PLAVIX) 75 mg, oral, Daily   • coenzyme Q10 (COQ10) 100 mg, oral, Daily   • empagliflozin (JARDIANCE) 10 mg, oral, Daily   • levothyroxine (SYNTHROID) 100 mcg, oral, Daily (6:30a)   • metoprolol succinate XL (TOPROL-XL) 100 mg, oral, Daily   • multivitamin (THERAGRAN) tablet oral, Daily   • sacubitriL-valsartan (ENTRESTO) 49-51 mg per tablet 1 tablet, oral, 2 times daily   • sildenafiL (pulm.hypertension) (REVATIO) 20 mg, oral, 3 times daily   • simvastatin (ZOCOR) 10 mg, oral, Nightly   • spironolactone (ALDACTONE) 25 mg, oral, Daily   • torsemide (DEMADEX) 10-20 mg, oral, Daily, 10 mg if weight is 176-181 lbs, 20 mg if weight is >181 lbs       Scheduled Meds:  • cefepime  2 g intravenous q12h INT   • chlorhexidine  15 mL Mouth/Throat 2 times per day   • clindamycin  900 mg intravenous q8h INT   • collagenase  1 application Topical Daily   • heparin (porcine)  5,000 Units subcutaneous q8h IVÁN   • insulin aspart U-100  3-5  "Units subcutaneous q6h INT   • metoprolol  2.5 mg intravenous q6h INT   • pantoprazole  40 mg intravenous q24h   • potassium chloride in water  20 mEq intravenous Once    Followed by   • potassium chloride in water  20 mEq intravenous Once   • potassium phosphate  15 mmol intravenous Once   • sodium hypochlorite   Topical Daily     Continuous Infusions:  • dexmedetomidine in 0.9 % NaCl  0.2-1.5 mcg/kg/hr 0.2 mcg/kg/hr (08/06/22 0800)   • DOBUTamine  5 mcg/kg/min 5 mcg/kg/min (08/06/22 0800)   • fentaNYL  0-200 mcg/hr 25 mcg/hr (08/06/22 0800)   • norepinephrine  0.5-90 mcg/min 5 mcg/min (08/06/22 0817)   • vasopressin (VASOSTRICT) continuous infusion (mixture)  0.03 Units/min Stopped (08/05/22 0836)     PRN Meds:.•  glucose **OR** dextrose **OR** glucagon **OR** dextrose 50 % in water (D50)  •  glucose **OR** dextrose **OR** glucagon **OR** dextrose 50 % in water (D50)  •  fentaNYL **AND** fentaNYL  •  HYDROmorphone      Intake/Output Summary (Last 24 hours) at 8/6/2022 0911  Last data filed at 8/6/2022 0800  Gross per 24 hour   Intake 1321.17 ml   Output 1535 ml   Net -213.83 ml        Weights (last 7 days)     Date/Time Weight Drug Calculation Weight (Dosing Weight)    08/03/22 0944 80.3 kg (177 lb) --    08/03/22 0300 -- 80.7 kg (177 lb 14.6 oz)    08/02/22 2334 80.7 kg (177 lb 14.6 oz) --    08/02/22 1610 80.7 kg (178 lb) --           Wt Readings from Last 3 Encounters:   08/03/22 80.3 kg (177 lb)   08/02/22 80.3 kg (177 lb)   07/25/22 81.1 kg (178 lb 11.2 oz)        REVIEW OF SYSTEMS:    A complete review of systems limited by sedation.    PHYSICAL EXAMINATION:  Visit Vitals  BP (!) 97/59   Pulse 77   Temp 37.1 °C (98.8 °F) (Bladder)   Resp (!) 24   Ht 1.854 m (6' 1\")   Wt 80.3 kg (177 lb)   SpO2 100%   BMI 23.35 kg/m²     Body surface area is 2.03 meters squared.  Body mass index is 23.35 kg/m².  General: intubated and sedated   HEENT: No corneal arcus or xanthelasmas.  Sclerae are anicteric.   Neck: CVC on " the right. cvp 8  Heart: s1s2 audible, no significant murmurs   Chest: Symmetrical.  Lungs: intubated. Breathing sounds audible bl .  Abdomen: soft, nt   Extremities: No cyanosis or clubbing.trace-1  lower extremity edema.  Distal pulses are easily palpable.  Skin: Warm and dry and well perfused.  Neurologic:  na  Psychiatric:na    Labs   Results from last 7 days   Lab Units 08/06/22  0609 08/05/22  0446 08/04/22  2133 08/04/22  0504   SODIUM mEQ/L 144 141 139 138   POTASSIUM mEQ/L 3.6 4.0 3.7 3.6   CHLORIDE mEQ/L 108 106 106 107   CO2 mEQ/L 24 22 22 19*   BUN mg/dL 50* 44* 42* 36*   CREATININE mg/dL 1.3 1.5* 1.4* 1.3   CALCIUM mg/dL 7.7* 7.6* 7.7* 7.7*   ALBUMIN g/dL 2.1* 2.1*  --  2.2*   BILIRUBIN TOTAL mg/dL 1.7* 1.9*  --  2.0*   ALK PHOS IU/L 66 68  --  69   ALT IU/L 20 26  --  27   AST IU/L 36 48*  --  52*   GLUCOSE mg/dL 199* 189* 192* 226*       No results found for: HSTROPONINI    Results from last 7 days   Lab Units 08/06/22  0609 08/05/22  0446 08/04/22  0504   WBC K/uL 14.65* 16.59* 20.68*   HEMOGLOBIN g/dL 11.7* 11.6* 11.5*   HEMATOCRIT % 34.5* 34.5* 33.6*   PLATELETS K/uL 84* 114* 137*       Lab Results   Component Value Date    CHOL 94 07/23/2022    HDL 23 (L) 07/23/2022    LDLCALC 63 07/23/2022    TRIG 41 07/23/2022    TSH 4.72 08/01/2022    HGBA1C 6.3 (H) 07/23/2022       Outside records reviewed..    Imaging  CXR reviewed     Cardiac Studies  TTE 04/13/2022 ( outside report , images not available)     •  A complete transthoracic echocardiogram (including 2D, color flow   Doppler, spectral Doppler and M-mode imaging) was performed using the   standard protocol. The study quality was adequate and poor.  •  The left ventricle is normal in size. Endocardium is incompletely   visualized and segmental wall motion abnormalities cannot be excluded.   Moderately decreased left ventricular ejection fraction. The left   ventricular ejection fraction by visual estimate is 35% to 40%.  •  There is indeterminate  diastolic function.  •  The right ventricle is severely dilated. There is moderately to   severely decreased systolic function of the right ventricle.  •  The left atrium is moderately dilated.  •  The right atrium is severely dilated.  •  There is trivial mitral valve leaflet thickening. There is trace mitral   regurgitation. There is no mitral stenosis.  •  The aortic valve is trileaflet. There is mild aortic valve thickening.   There is mild aortic valve calcification. There is no aortic stenosis.   There is mild aortic regurgitation.  •  There is moderate to severe tricuspid regurgitation.  •  The inferior vena cava is dilated (diameter >21 mm) and decreases <50%   in size with inspiration, suggesting an elevated right atrial pressure of   15 mmHg (range 10-20 mm Hg).  •  There is no prior study available for comparison at this organization.    Left Ventricle  The left ventricle is normal in size. Endocardium is incompletely visualized and segmental wall motion abnormalities cannot be excluded. The left ventricular ejection fraction by visual estimate is 35% to 40%. Moderately decreased left ventricular ejection fraction. There is indeterminate diastolic function.    Right Ventricle  The right ventricle is severely dilated. Off axis imaging of the right ventricle, but visually systolic function appears moderate to severely reduced.    Left Atrium  The left atrium is moderately dilated.    Right Atrium  The right atrium is severely dilated.    IVC/SVC  The inferior vena cava is dilated (diameter >21 mm) and decreases <50% in size with inspiration, suggesting an elevated right atrial pressure of 15 mmHg (range 10-20 mm Hg).    Mitral Valve  There is trivial mitral valve leaflet thickening. There is no mitral stenosis. There is trace mitral regurgitation.    Tricuspid Valve  There is moderate tricuspid annular dilatation. There is no tricuspid stenosis. There is moderate to severe tricuspid regurgitation.    Aortic  Valve  The aortic valve is trileaflet. There is mild aortic valve thickening. There is mild aortic valve calcification. There is no aortic stenosis. There is mild aortic regurgitation.    Pulmonic Valve  There is no pulmonic stenosis. There is mild pulmonic valve regurgitation.    Pericardium  There are echocardiographic findings consistent with fat pad.    Aortic Arch/Descending/Abdominal Aorta  The aortic root is normal in size. The proximal segment of the ascending aorta is normal in size.     ECG   07/22/2022   Atrial fibrillation   rbbb   twi on precordial leads, present on ecg 07/2022     Telemetry  Atrial fibrillation , rbbb with ventricular response in 80 range over the past 24h    Assessment:  This is a 89 y.o. male being consulted for cardiovascular management    #Justin's gangrene:   Management per primary service   On abx; id has been following   empagliflozin should be discontinued at the time of discharge. He was recently started on this agent in may 2022      # iCM:  # HFrEF:  # PH on sildenafil as outpatient:    NYHA class III and he has ACC/AHA Stage C as outpatient previously       CVP 8      His lvef appears 40-45% with severe RV dilation and diastolic dysfunction.     He is on norepinephrine 6, vasopressin and dobutamine 5      Please administer IV furosemide 40 mg tid today to aim for net negative  Strict intake and output   Continue holding entresto spironolactone and sildenafil while on vasopressor support and npo; pending further course   Please continue transducing cvp  Can dc dobutamine as it does not appear he is in cardiogenic shock. Can use norepinephrine or norepinephrine / vasopressin instead  Please continue to check MVO2 and lactate     # Permanent atrial fibrillation:   [id number] 7     Not on AC as outpatient given history of GIB   He is on IV metoprolol 2.5 mg q6h     Case  discussed with Dr. Lacey and with the primary team. Final recommendations are pending attending  co-signature. Please page Cardiology at [phone] with any questions.     Sabiha Cordon MD  8/6/2022

## 2022-08-07 ENCOUNTER — APPOINTMENT (INPATIENT)
Dept: RADIOLOGY | Facility: HOSPITAL | Age: 87
DRG: 717 | End: 2022-08-07
Attending: SURGERY
Payer: MEDICARE

## 2022-08-07 LAB
ALBUMIN SERPL-MCNC: 1.8 G/DL (ref 3.4–5)
ALBUMIN SERPL-MCNC: 1.9 G/DL (ref 3.4–5)
ALP SERPL-CCNC: 59 IU/L (ref 35–126)
ALP SERPL-CCNC: 61 IU/L (ref 35–126)
ALT SERPL-CCNC: 15 IU/L (ref 16–63)
ALT SERPL-CCNC: 18 IU/L (ref 16–63)
ANION GAP SERPL CALC-SCNC: 6 MEQ/L (ref 3–15)
ANION GAP SERPL CALC-SCNC: 6 MEQ/L (ref 3–15)
ANION GAP SERPL CALC-SCNC: 7 MEQ/L (ref 3–15)
AST SERPL-CCNC: 31 IU/L (ref 15–41)
AST SERPL-CCNC: 33 IU/L (ref 15–41)
BACTERIA BLD CULT: NORMAL
BACTERIA BLD CULT: NORMAL
BASE EXCESS BLDA CALC-SCNC: -0.1 MEQ/L
BASE EXCESS BLDA CALC-SCNC: 0.5 MEQ/L
BILIRUB SERPL-MCNC: 1.6 MG/DL (ref 0.3–1.2)
BILIRUB SERPL-MCNC: 1.7 MG/DL (ref 0.3–1.2)
BUN SERPL-MCNC: 47 MG/DL (ref 8–20)
BUN SERPL-MCNC: 48 MG/DL (ref 8–20)
BUN SERPL-MCNC: 55 MG/DL (ref 8–20)
CA-I BLD-SCNC: 1.1 MMOL/L (ref 1.15–1.27)
CA-I BLD-SCNC: 1.16 MMOL/L (ref 1.15–1.27)
CALCIUM SERPL-MCNC: 7.5 MG/DL (ref 8.9–10.3)
CALCIUM SERPL-MCNC: 7.6 MG/DL (ref 8.9–10.3)
CALCIUM SERPL-MCNC: 7.7 MG/DL (ref 8.9–10.3)
CHLORIDE BLDA-SCNC: 114 MEQ/L (ref 98–109)
CHLORIDE SERPL-SCNC: 115 MEQ/L (ref 98–109)
CHLORIDE SERPL-SCNC: 116 MEQ/L (ref 98–109)
CHLORIDE SERPL-SCNC: 118 MEQ/L (ref 98–109)
CO2 BLDA-SCNC: 26.7 MEQ/L (ref 22–32)
CO2 SERPL-SCNC: 22 MEQ/L (ref 22–32)
CO2 SERPL-SCNC: 23 MEQ/L (ref 22–32)
CO2 SERPL-SCNC: 26 MEQ/L (ref 22–32)
COHGB MFR BLD: 1.7 %
CREAT SERPL-MCNC: 1.1 MG/DL (ref 0.8–1.3)
CREAT SERPL-MCNC: 1.2 MG/DL (ref 0.8–1.3)
CREAT SERPL-MCNC: 1.2 MG/DL (ref 0.8–1.3)
ERYTHROCYTE [DISTWIDTH] IN BLOOD BY AUTOMATED COUNT: 14.8 % (ref 11.6–14.4)
FIO2 ON VENT: ABNORMAL %
FIO2 ON VENT: ABNORMAL %
GFR SERPL CREATININE-BSD FRML MDRD: 56.9 ML/MIN/1.73M*2
GFR SERPL CREATININE-BSD FRML MDRD: 56.9 ML/MIN/1.73M*2
GFR SERPL CREATININE-BSD FRML MDRD: >60 ML/MIN/1.73M*2
GLUCOSE BLD-MCNC: 195 MG/DL (ref 70–99)
GLUCOSE BLD-MCNC: 226 MG/DL (ref 70–99)
GLUCOSE BLDA-MCNC: 186 MG/DL (ref 70–99)
GLUCOSE SERPL-MCNC: 194 MG/DL (ref 70–99)
GLUCOSE SERPL-MCNC: 207 MG/DL (ref 70–99)
GLUCOSE SERPL-MCNC: 276 MG/DL (ref 70–99)
GRAM STN SPEC: ABNORMAL
HCO3 BLDA-SCNC: 24.9 MEQ/L (ref 21–28)
HCO3 BLDA-SCNC: 25.3 MEQ/L (ref 21–28)
HCT VFR BLDCO AUTO: 35.2 % (ref 40.1–51)
HGB BLD-MCNC: 11.6 G/DL (ref 13.7–17.5)
HGB BLDA OXIMETRY-MCNC: 12.4 G/DL (ref 14–17.5)
INHALED O2 CONCENTRATION: ABNORMAL %
INHALED O2 CONCENTRATION: ABNORMAL %
INR PPP: 1.5
LACTATE BLDA-SCNC: 1.3 MMOL/L (ref 0.4–1.6)
LACTATE SERPL-SCNC: 1.2 MMOL/L (ref 0.4–2)
LACTATE SERPL-SCNC: 1.3 MMOL/L (ref 0.4–2)
MAGNESIUM SERPL-MCNC: 2.2 MG/DL (ref 1.8–2.5)
MCH RBC QN AUTO: 34.4 PG (ref 28–33.2)
MCHC RBC AUTO-ENTMCNC: 33 G/DL (ref 32.2–36.5)
MCV RBC AUTO: 104.5 FL (ref 83–98)
METHGB BLD-SCNC: 0.6 % (ref 0.4–1.5)
MICROORGANISM SPEC CULT: ABNORMAL
PCO2 BLDA: 36 MM HG (ref 35–48)
PCO2 BLDA: 41 MM HG (ref 35–48)
PDW BLD AUTO: 11.2 FL (ref 9.4–12.4)
PH BLDA: 7.4 [PH] (ref 7.35–7.45)
PH BLDA: 7.43 [PH] (ref 7.35–7.45)
PHOSPHATE SERPL-MCNC: 2.3 MG/DL (ref 2.4–4.7)
PLATELET # BLD AUTO: 101 K/UL (ref 150–350)
PO2 BLDA: 248 MM HG (ref 83–100)
PO2 BLDA: 256 MM HG (ref 83–100)
POCT TEST: ABNORMAL
POCT TEST: ABNORMAL
POTASSIUM BLDA-SCNC: 4 MEQ/L (ref 3.4–4.5)
POTASSIUM SERPL-SCNC: 3.7 MEQ/L (ref 3.6–5.1)
POTASSIUM SERPL-SCNC: 3.9 MEQ/L (ref 3.6–5.1)
POTASSIUM SERPL-SCNC: 4 MEQ/L (ref 3.6–5.1)
PROT SERPL-MCNC: 5.1 G/DL (ref 6–8.2)
PROT SERPL-MCNC: 5.1 G/DL (ref 6–8.2)
PROTHROMBIN TIME: 17.4 SEC (ref 12.2–14.5)
RBC # BLD AUTO: 3.37 M/UL (ref 4.5–5.8)
SAO2 % BLDA: 98 % (ref 93–98)
SAO2 % BLDV: 48 % (ref 30–60)
SAO2 % BLDV: 64 % (ref 30–60)
SODIUM BLDA-SCNC: 145 MEQ/L (ref 136–145)
SODIUM SERPL-SCNC: 145 MEQ/L (ref 136–144)
SODIUM SERPL-SCNC: 147 MEQ/L (ref 136–144)
SODIUM SERPL-SCNC: 147 MEQ/L (ref 136–144)
WBC # BLD AUTO: 16.19 K/UL (ref 3.8–10.5)

## 2022-08-07 PROCEDURE — 63700000 HC SELF-ADMINISTRABLE DRUG: Performed by: SURGERY

## 2022-08-07 PROCEDURE — 85018 HEMOGLOBIN: CPT | Performed by: SURGERY

## 2022-08-07 PROCEDURE — 25000000 HC PHARMACY GENERAL: Performed by: SURGERY

## 2022-08-07 PROCEDURE — 85610 PROTHROMBIN TIME: CPT | Performed by: SURGERY

## 2022-08-07 PROCEDURE — 63600000 HC DRUGS/DETAIL CODE: Performed by: STUDENT IN AN ORGANIZED HEALTH CARE EDUCATION/TRAINING PROGRAM

## 2022-08-07 PROCEDURE — 36415 COLL VENOUS BLD VENIPUNCTURE: CPT

## 2022-08-07 PROCEDURE — 99233 SBSQ HOSP IP/OBS HIGH 50: CPT | Performed by: INTERNAL MEDICINE

## 2022-08-07 PROCEDURE — 63600000 HC DRUGS/DETAIL CODE: Performed by: SURGERY

## 2022-08-07 PROCEDURE — 25800000 HC PHARMACY IV SOLUTIONS: Performed by: STUDENT IN AN ORGANIZED HEALTH CARE EDUCATION/TRAINING PROGRAM

## 2022-08-07 PROCEDURE — 84132 ASSAY OF SERUM POTASSIUM: CPT | Performed by: SURGERY

## 2022-08-07 PROCEDURE — 25800000 HC PHARMACY IV SOLUTIONS: Performed by: SURGERY

## 2022-08-07 PROCEDURE — 85027 COMPLETE CBC AUTOMATED: CPT | Performed by: SURGERY

## 2022-08-07 PROCEDURE — 83605 ASSAY OF LACTIC ACID: CPT | Performed by: SURGERY

## 2022-08-07 PROCEDURE — 20000000 HC ROOM AND CARE ICU

## 2022-08-07 PROCEDURE — 82330 ASSAY OF CALCIUM: CPT | Performed by: SURGERY

## 2022-08-07 PROCEDURE — 94003 VENT MGMT INPAT SUBQ DAY: CPT

## 2022-08-07 PROCEDURE — 84100 ASSAY OF PHOSPHORUS: CPT | Performed by: SURGERY

## 2022-08-07 PROCEDURE — 83735 ASSAY OF MAGNESIUM: CPT | Performed by: SURGERY

## 2022-08-07 PROCEDURE — 80053 COMPREHEN METABOLIC PANEL: CPT | Performed by: STUDENT IN AN ORGANIZED HEALTH CARE EDUCATION/TRAINING PROGRAM

## 2022-08-07 PROCEDURE — 82810 BLOOD GASES O2 SAT ONLY: CPT

## 2022-08-07 PROCEDURE — 82803 BLOOD GASES ANY COMBINATION: CPT | Performed by: STUDENT IN AN ORGANIZED HEALTH CARE EDUCATION/TRAINING PROGRAM

## 2022-08-07 PROCEDURE — 71045 X-RAY EXAM CHEST 1 VIEW: CPT

## 2022-08-07 PROCEDURE — 99291 CRITICAL CARE FIRST HOUR: CPT | Performed by: SURGERY

## 2022-08-07 RX ORDER — POTASSIUM CHLORIDE 14.9 MG/ML
20 INJECTION INTRAVENOUS ONCE
Status: COMPLETED | OUTPATIENT
Start: 2022-08-07 | End: 2022-08-07

## 2022-08-07 RX ORDER — FUROSEMIDE 10 MG/ML
40 INJECTION INTRAMUSCULAR; INTRAVENOUS ONCE
Status: COMPLETED | OUTPATIENT
Start: 2022-08-07 | End: 2022-08-07

## 2022-08-07 RX ADMIN — CEFEPIME 2 G: 2 INJECTION, POWDER, FOR SOLUTION INTRAVENOUS at 12:55

## 2022-08-07 RX ADMIN — Medication 25 MCG/HR: at 06:00

## 2022-08-07 RX ADMIN — PANTOPRAZOLE SODIUM 40 MG: 40 INJECTION, POWDER, FOR SOLUTION INTRAVENOUS at 08:16

## 2022-08-07 RX ADMIN — DOBUTAMINE 2.5 MCG/KG/MIN: 12.5 INJECTION, SOLUTION, CONCENTRATE INTRAVENOUS at 00:55

## 2022-08-07 RX ADMIN — COLLAGENASE SANTYL 1 APPLICATION.: 250 OINTMENT TOPICAL at 00:52

## 2022-08-07 RX ADMIN — CLINDAMYCIN PHOSPHATE 900 MG: 900 INJECTION, SOLUTION INTRAVENOUS at 22:56

## 2022-08-07 RX ADMIN — METOPROLOL TARTRATE 2.5 MG: 5 INJECTION, SOLUTION INTRAVENOUS at 12:30

## 2022-08-07 RX ADMIN — METOPROLOL TARTRATE 2.5 MG: 5 INJECTION, SOLUTION INTRAVENOUS at 06:15

## 2022-08-07 RX ADMIN — Medication 6 MCG/MIN: at 04:24

## 2022-08-07 RX ADMIN — CLINDAMYCIN PHOSPHATE 900 MG: 900 INJECTION, SOLUTION INTRAVENOUS at 08:30

## 2022-08-07 RX ADMIN — POTASSIUM CHLORIDE 20 MEQ: 200 INJECTION, SOLUTION INTRAVENOUS at 06:03

## 2022-08-07 RX ADMIN — INSULIN ASPART 3 UNITS: 100 INJECTION, SOLUTION INTRAVENOUS; SUBCUTANEOUS at 18:28

## 2022-08-07 RX ADMIN — CEFEPIME 2 G: 2 INJECTION, POWDER, FOR SOLUTION INTRAVENOUS at 23:54

## 2022-08-07 RX ADMIN — DEXMEDETOMIDINE 0.6 MCG/KG/HR: 200 INJECTION, SOLUTION INTRAVENOUS at 08:26

## 2022-08-07 RX ADMIN — Medication 3 MCG/MIN: at 20:45

## 2022-08-07 RX ADMIN — INSULIN ASPART 3 UNITS: 100 INJECTION, SOLUTION INTRAVENOUS; SUBCUTANEOUS at 06:08

## 2022-08-07 RX ADMIN — HYDROMORPHONE HYDROCHLORIDE 0.5 MG: 1 INJECTION, SOLUTION INTRAMUSCULAR; INTRAVENOUS; SUBCUTANEOUS at 18:43

## 2022-08-07 RX ADMIN — FUROSEMIDE 40 MG: 10 INJECTION, SOLUTION INTRAMUSCULAR; INTRAVENOUS at 05:34

## 2022-08-07 RX ADMIN — POTASSIUM CHLORIDE 20 MEQ: 200 INJECTION, SOLUTION INTRAVENOUS at 03:48

## 2022-08-07 RX ADMIN — DEXMEDETOMIDINE 0.6 MCG/KG/HR: 200 INJECTION, SOLUTION INTRAVENOUS at 20:45

## 2022-08-07 RX ADMIN — HEPARIN SODIUM 5000 UNITS: 5000 INJECTION, SOLUTION INTRAVENOUS; SUBCUTANEOUS at 05:35

## 2022-08-07 RX ADMIN — INSULIN ASPART 3 UNITS: 100 INJECTION, SOLUTION INTRAVENOUS; SUBCUTANEOUS at 00:23

## 2022-08-07 RX ADMIN — COLLAGENASE SANTYL 1 APPLICATION.: 250 OINTMENT TOPICAL at 08:14

## 2022-08-07 RX ADMIN — INSULIN ASPART 3 UNITS: 100 INJECTION, SOLUTION INTRAVENOUS; SUBCUTANEOUS at 12:57

## 2022-08-07 RX ADMIN — DAKIN'S SOLUTION 0.125% (QUARTER STRENGTH): 0.12 SOLUTION at 08:14

## 2022-08-07 RX ADMIN — DAKIN'S SOLUTION 0.125% (QUARTER STRENGTH): 0.12 SOLUTION at 00:52

## 2022-08-07 RX ADMIN — CLINDAMYCIN PHOSPHATE 900 MG: 900 INJECTION, SOLUTION INTRAVENOUS at 15:43

## 2022-08-07 RX ADMIN — HEPARIN SODIUM 5000 UNITS: 5000 INJECTION, SOLUTION INTRAVENOUS; SUBCUTANEOUS at 22:03

## 2022-08-07 RX ADMIN — CHLORHEXIDINE GLUCONATE 15 ML: 1.2 SOLUTION ORAL at 22:06

## 2022-08-07 RX ADMIN — HYDROMORPHONE HYDROCHLORIDE 0.5 MG: 1 INJECTION, SOLUTION INTRAMUSCULAR; INTRAVENOUS; SUBCUTANEOUS at 00:30

## 2022-08-07 RX ADMIN — CHLORHEXIDINE GLUCONATE 15 ML: 1.2 SOLUTION ORAL at 09:37

## 2022-08-07 RX ADMIN — HYDROMORPHONE HYDROCHLORIDE 0.5 MG: 1 INJECTION, SOLUTION INTRAMUSCULAR; INTRAVENOUS; SUBCUTANEOUS at 03:31

## 2022-08-07 RX ADMIN — HEPARIN SODIUM 5000 UNITS: 5000 INJECTION, SOLUTION INTRAVENOUS; SUBCUTANEOUS at 13:40

## 2022-08-07 RX ADMIN — FUROSEMIDE 40 MG: 10 INJECTION, SOLUTION INTRAMUSCULAR; INTRAVENOUS at 12:29

## 2022-08-07 NOTE — PLAN OF CARE
Problem: Adult Inpatient Plan of Care  Goal: Plan of Care Review  Outcome: Progressing  Flowsheets (Taken 8/7/2022 0619)  Progress: no change  Plan of Care Reviewed With: patient  Outcome Summary: dobutamine weaned and titrated off. wound care provided. tolerating tube feed increases   Plan of Care Review  Plan of Care Reviewed With: patient  Progress: no change  Outcome Summary: dobutamine weaned and titrated off. wound care provided. tolerating tube feed increases

## 2022-08-07 NOTE — PROGRESS NOTES
Cardiology Progress Note   [facility]ART [facility]    [facility]  [facility] [address]  [address] [place] [address]    TEL  [phone]  [facility]       Patient: Samy Elena  MRN: [id number]  : 10/6/1930  Admission Date: 2022   Consult Date: 22  Reason for Consult: H/o HFrEF, PH, atrial fibrillation -postop management   Outpatient Cardiologist: Dr. Yovanny Cruz ( office visit 2022)     Interval History:   Seen and examined this am.   He is well sedated     He is currently only on norepinephrine 5   CVP  14  PA mean 23  PCWP 17    Good urine output   He remains net positive     HPI:   Samy Elena is a 89 y.o. 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    Cardiology is being consulted for management recommendations given his extensive cardiac history.     Her chart review, the patient had a recent admission at the end of July with changes in speech. CVA was ruled out at the time of that admission .symptoms was attributed to  postural/orthostatic changes related to symptomatic hypotension. Patient was discharged to rehabilitation unit where he developed  buttocks and pubic pain.He was referred to be seen by surgery as outpatient that lead to current admission.       Past Medical History:   Diagnosis Date   • Atrial fibrillation (CMS/HCC)    • Disease of thyroid gland    • GI (gastrointestinal bleed)    • Hypertension    • Myocardial infarction (CMS/HCC)           Past Surgical History:   Procedure Laterality Date   • BYPASS GRAFT         Social History     Socioeconomic History   • Marital status:      Spouse name: Not on file   • Number of children: Not on file   • Years of education: Not on file   • Highest education level: Not on  file   Occupational History   • Not on file   Tobacco Use   • Smoking status: Never Smoker   • Smokeless tobacco: Never Used   Substance and Sexual Activity   • Alcohol use: Not on file   • Drug use: Not on file   • Sexual activity: Not on file   Other Topics Concern   • Not on file   Social History Narrative   • Not on file     Social Determinants of Health     Financial Resource Strain: Not on file   Food Insecurity: No Food Insecurity   • Worried About Running Out of Food in the Last Year: Never true   • Ran Out of Food in the Last Year: Never true   Transportation Needs: Not on file   Physical Activity: Not on file   Stress: Not on file   Social Connections: Not on file   Intimate Partner Violence: Not on file   Housing Stability: Not on file        History reviewed. No pertinent family history.     Jardiance [empagliflozin]    Current Outpatient Medications   Medication Instructions   • acetaminophen (TYLENOL) 325 mg tablet oral   • bisacodyL (DULCOLAX) 10 mg suppository rectal   • clopidogreL (PLAVIX) 75 mg, oral, Daily   • coenzyme Q10 (COQ10) 100 mg, oral, Daily   • empagliflozin (JARDIANCE) 10 mg, oral, Daily   • levothyroxine (SYNTHROID) 100 mcg, oral, Daily (6:30a)   • metoprolol succinate XL (TOPROL-XL) 100 mg, oral, Daily   • multivitamin (THERAGRAN) tablet oral, Daily   • sacubitriL-valsartan (ENTRESTO) 49-51 mg per tablet 1 tablet, oral, 2 times daily   • sildenafiL (pulm.hypertension) (REVATIO) 20 mg, oral, 3 times daily   • simvastatin (ZOCOR) 10 mg, oral, Nightly   • spironolactone (ALDACTONE) 25 mg, oral, Daily   • torsemide (DEMADEX) 10-20 mg, oral, Daily, 10 mg if weight is 176-181 lbs, 20 mg if weight is >181 lbs       Scheduled Meds:  • cefepime  2 g intravenous q12h INT   • chlorhexidine  15 mL Mouth/Throat 2 times per day   • clindamycin  900 mg intravenous q8h INT   • collagenase  1 application Topical Daily   • heparin (porcine)  5,000 Units subcutaneous q8h IVÁN   • insulin aspart U-100   "3-5 Units subcutaneous q6h INT   • metoprolol  2.5 mg intravenous q6h INT   • pantoprazole  40 mg intravenous q24h   • sodium hypochlorite   Topical Daily     Continuous Infusions:  • dexmedetomidine in 0.9 % NaCl  0.2-1.5 mcg/kg/hr 0.6 mcg/kg/hr (08/07/22 0826)   • DOBUTamine  2.5 mcg/kg/min Stopped (08/07/22 0322)   • fentaNYL  0-200 mcg/hr 25 mcg/hr (08/07/22 0800)   • norepinephrine  0.5-90 mcg/min 5 mcg/min (08/07/22 0800)     PRN Meds:.•  glucose **OR** dextrose **OR** glucagon **OR** dextrose 50 % in water (D50)  •  glucose **OR** dextrose **OR** glucagon **OR** dextrose 50 % in water (D50)  •  fentaNYL **AND** fentaNYL  •  HYDROmorphone      Intake/Output Summary (Last 24 hours) at 8/7/2022 0911  Last data filed at 8/7/2022 0800  Gross per 24 hour   Intake 1754.74 ml   Output 3385 ml   Net -1630.26 ml        Weights (last 7 days)     Date/Time Weight Drug Calculation Weight (Dosing Weight)    08/03/22 0944 80.3 kg (177 lb) --    08/03/22 0300 -- 80.7 kg (177 lb 14.6 oz)    08/02/22 2334 80.7 kg (177 lb 14.6 oz) --    08/02/22 1610 80.7 kg (178 lb) --           Wt Readings from Last 3 Encounters:   08/03/22 80.3 kg (177 lb)   08/02/22 80.3 kg (177 lb)   07/25/22 81.1 kg (178 lb 11.2 oz)        REVIEW OF SYSTEMS:    A complete review of systems limited by sedation.    PHYSICAL EXAMINATION:  Visit Vitals  /63   Pulse 83   Temp 37.1 °C (98.8 °F) (Bladder)   Resp (!) 29   Ht 1.854 m (6' 1\")   Wt 80.3 kg (177 lb)   SpO2 100%   BMI 23.35 kg/m²     Body surface area is 2.03 meters squared.  Body mass index is 23.35 kg/m².  General: intubated and sedated   HEENT: No corneal arcus or xanthelasmas.  Sclerae are anicteric.   Neck: CVC on the right. cvp13  Heart: s1s2 audible, no significant murmurs , irregular   Chest: Symmetrical.  Lungs: intubated. Breathing sounds audible bl .  Abdomen: soft, nt   Extremities: No cyanosis or clubbing.+1  lower extremity edema.  Distal pulses are easily palpable.  Skin: Warm and " dry and well perfused.  Neurologic:  na  Psychiatric:na    Labs   Results from last 7 days   Lab Units 08/07/22 0448 08/06/22  2344 08/06/22  0609 08/05/22  0446   SODIUM mEQ/L 147* 145* 144 141   POTASSIUM mEQ/L 4.0 3.7 3.6 4.0   CHLORIDE mEQ/L 118* 116* 108 106   CO2 mEQ/L 22 23 24 22   BUN mg/dL 48* 47* 50* 44*   CREATININE mg/dL 1.2 1.2 1.3 1.5*   CALCIUM mg/dL 7.7* 7.5* 7.7* 7.6*   ALBUMIN g/dL 1.9*  --  2.1* 2.1*   BILIRUBIN TOTAL mg/dL 1.7*  --  1.7* 1.9*   ALK PHOS IU/L 61  --  66 68   ALT IU/L 18  --  20 26   AST IU/L 33  --  36 48*   GLUCOSE mg/dL 194* 207* 199* 189*       No results found for: HSTROPONINI    Results from last 7 days   Lab Units 08/07/22 0448 08/06/22  0609 08/05/22  0446   WBC K/uL 16.19* 14.65* 16.59*   HEMOGLOBIN g/dL 11.6* 11.7* 11.6*   HEMATOCRIT % 35.2* 34.5* 34.5*   PLATELETS K/uL 101* 84* 114*       Lab Results   Component Value Date    CHOL 94 07/23/2022    HDL 23 (L) 07/23/2022    LDLCALC 63 07/23/2022    TRIG 41 07/23/2022    TSH 4.72 08/01/2022    HGBA1C 6.3 (H) 07/23/2022       Outside records reviewed..    Imaging  CXR reviewed     Cardiac Studies  TTE 04/13/2022 ( outside report , images not available)     •  A complete transthoracic echocardiogram (including 2D, color flow   Doppler, spectral Doppler and M-mode imaging) was performed using the   standard protocol. The study quality was adequate and poor.  •  The left ventricle is normal in size. Endocardium is incompletely   visualized and segmental wall motion abnormalities cannot be excluded.   Moderately decreased left ventricular ejection fraction. The left   ventricular ejection fraction by visual estimate is 35% to 40%.  •  There is indeterminate diastolic function.  •  The right ventricle is severely dilated. There is moderately to   severely decreased systolic function of the right ventricle.  •  The left atrium is moderately dilated.  •  The right atrium is severely dilated.  •  There is trivial mitral valve  leaflet thickening. There is trace mitral   regurgitation. There is no mitral stenosis.  •  The aortic valve is trileaflet. There is mild aortic valve thickening.   There is mild aortic valve calcification. There is no aortic stenosis.   There is mild aortic regurgitation.  •  There is moderate to severe tricuspid regurgitation.  •  The inferior vena cava is dilated (diameter >21 mm) and decreases <50%   in size with inspiration, suggesting an elevated right atrial pressure of   15 mmHg (range 10-20 mm Hg).  •  There is no prior study available for comparison at this organization.    Left Ventricle  The left ventricle is normal in size. Endocardium is incompletely visualized and segmental wall motion abnormalities cannot be excluded. The left ventricular ejection fraction by visual estimate is 35% to 40%. Moderately decreased left ventricular ejection fraction. There is indeterminate diastolic function.    Right Ventricle  The right ventricle is severely dilated. Off axis imaging of the right ventricle, but visually systolic function appears moderate to severely reduced.    Left Atrium  The left atrium is moderately dilated.    Right Atrium  The right atrium is severely dilated.    IVC/SVC  The inferior vena cava is dilated (diameter >21 mm) and decreases <50% in size with inspiration, suggesting an elevated right atrial pressure of 15 mmHg (range 10-20 mm Hg).    Mitral Valve  There is trivial mitral valve leaflet thickening. There is no mitral stenosis. There is trace mitral regurgitation.    Tricuspid Valve  There is moderate tricuspid annular dilatation. There is no tricuspid stenosis. There is moderate to severe tricuspid regurgitation.    Aortic Valve  The aortic valve is trileaflet. There is mild aortic valve thickening. There is mild aortic valve calcification. There is no aortic stenosis. There is mild aortic regurgitation.    Pulmonic Valve  There is no pulmonic stenosis. There is mild pulmonic valve  regurgitation.    Pericardium  There are echocardiographic findings consistent with fat pad.    Aortic Arch/Descending/Abdominal Aorta  The aortic root is normal in size. The proximal segment of the ascending aorta is normal in size.     ECG   07/22/2022   Atrial fibrillation   rbbb   twi on precordial leads, present on ecg 07/2022     Telemetry  Atrial fibrillation , rbbb with ventricular response in 70-80 range     Assessment:  This is a 89 y.o. male being consulted for cardiovascular management    #Justin's gangrene:   #Septic shock:   Management per primary service   On abx; id has been following   empagliflozin should be discontinued at the time of discharge. He was recently started on this agent in may 2022      # iCM:  # HFrEF:  # PH on sildenafil as outpatient:    NYHA class III and he has ACC/AHA Stage C as outpatient previously       CVP 13      His lvef appears 40-45% with severe RV dilation and diastolic dysfunction.     He is on norepinephrine 5. Off dobutamine and vasopressing     He has been improving overall from septic shock standpoint     Continue  IV furosemide 40 mg tid today if BP can tolerate to facilitate successful extubation   Strict intake and output   Continue holding entresto spironolactone and sildenafil while on vasopressor support and npo; pending further course   Please continue transducing cvp  Please continue to check MVO2 and lactate     # Permanent atrial fibrillation:   [id number]     Not on AC as outpatient given history of GIB   He is on IV metoprolol 2.5 mg q6h     Case  discussed with Dr. Lacey. Final recommendations are pending attending co-signature. Please page Cardiology at [phone] with any questions.     Sabiha Cordon MD  8/7/2022

## 2022-08-07 NOTE — PROGRESS NOTES
SURGICAL CRITICAL CARE DAILY PROGRESS NOTE     PATIENT NAME:  Samy Elena YOB: 1930    AGE:  89 y.o.  GENDER: male   MRN:  [id number]  PATIENT #: [id number]     SUBJECTIVE   Remains intubated in ICU. Alert and nods appropriately to questions. NGT advancing towards goal, currently at 40.    Currently on Levo 6. Doubutamine off. Vaso off. Mixed venous O2 78      Tolerated SBT for 10-12 hours. PRVC 40%/450/20/6 overnight    Has end-colostomy. Pink, perfused, protruding. Has gas. No stool yet.     ID following. On Clinda, switched to Cefepime 2g Q12.      No plan for further OR.       VITAL SIGNS     Temperature:   No data recorded.       Last 24 hours:    Heart Rate:  [] 83  Resp:  [22-29] 29  BP: ()/(55-76) 114/63  FiO2 (%) (Set):  [40 %] 40 %    VENT SETTINGS:   Vent Mode: Pressure regulated volume control  FiO2 (%) (Set):  [40 %] 40 %  S RR:  [20] 20  S VT:  [450 mL] 450 mL  PEEP/CPAP (Set, cmH2O):  [6 cm H20] 6 cm H20  MAP (Observed, cmH2O):  [6-9] 8    INTAKE & OUTPUT     I/O last 3 completed shifts:  In: 2509.6 [I.V.:1328.8; NG/GT:730.8; IV Piggyback:450]  Out: 4125 [Urine:4115; Stool:10]    Intake/Output Summary (Last 24 hours) at 8/7/2022 0901  Last data filed at 8/7/2022 0800  Gross per 24 hour   Intake 1754.74 ml   Output 3385 ml   Net -1630.26 ml     I/O this shift:  In: 170 [I.V.:58.3; NG/GT:111.7]  Out: 270 [Urine:270]     MEDICATIONS     Infusions:    • dexmedetomidine in 0.9 % NaCl  0.2-1.5 mcg/kg/hr 0.6 mcg/kg/hr (08/07/22 0826)   • DOBUTamine  2.5 mcg/kg/min Stopped (08/07/22 0322)   • fentaNYL  0-200 mcg/hr 25 mcg/hr (08/07/22 0800)   • norepinephrine  0.5-90 mcg/min 5 mcg/min (08/07/22 0800)            Scheduled:   • cefepime  2 g intravenous q12h INT   • chlorhexidine  15 mL Mouth/Throat 2 times per day   • clindamycin  900 mg intravenous q8h INT   • collagenase  1 application Topical Daily   • heparin (porcine)  5,000 Units subcutaneous q8h IVÁN   • insulin aspart  U-100  3-5 Units subcutaneous q6h INT   • metoprolol  2.5 mg intravenous q6h INT   • pantoprazole  40 mg intravenous q24h   • sodium hypochlorite   Topical Daily       Antibiotics:  Anti-infectives (From admission, onward)    Start     Dose/Rate Route Frequency Ordered Stop    08/05/22 1200  ceFEPIme (MAXIPIME) 2 g in 100 mL NSS vial in bag         2 g  200 mL/hr over 30 Minutes intravenous Every 12 hours interval 08/05/22 1110      08/03/22 0730  clindamycin (CLEOCIN) in dextrose 5 % 900 mg         900 mg  100 mL/hr over 30 Minutes intravenous Every 8 hours interval 08/03/22 0626            PRN:     glucose, 16-32 g of dextrose, PRN   Or  dextrose, 15-30 g of dextrose, PRN   Or  glucagon, 1 mg, PRN   Or  dextrose 50 % in water (D50), 25 mL, PRN  glucose, 16-32 g of dextrose, PRN   Or  dextrose, 15-30 g of dextrose, PRN   Or  glucagon, 1 mg, PRN   Or  dextrose 50 % in water (D50), 25 mL, PRN  fentaNYL, 25 mcg, q5 min PRN  HYDROmorphone, 0.5 mg, q3h PRN         DIAGNOSTIC DATA     LABS:  Recent Results (from the past 24 hour(s))   Blood Gas, arterial Comprehensive    Collection Time: 08/06/22 10:24 AM   Result Value Ref Range    pH, Arterial 7.42 7.35 - 7.45    pCO2, Arterial 35 35 - 48 mm Hg    pO2, Arterial 212 (H) 83 - 100 mm Hg    HCO3, Arterial 23.9 21.0 - 28.0 mEQ/L    Base Excess, Arterial -1.3 mEQ/L    O2 Sat, Arterial 98 93 - 98 %    TCO2, Arterial 23.8 22.0 - 32.0 mEQ/L    Lactate, Art 1.0 0.4 - 1.6 mmol/L    Glucose, Arterial 187 (H) 70 - 99 mg/dL    Sodium, Arterial 142 136 - 145 mEQ/L    Potassium, Arterial 3.4 3.4 - 4.5 mEQ/L    Chloride, Arterial 115 (H) 98 - 109 mEQ/L    Ionized Calcium, Arterial 1.06 (L) 1.15 - 1.27 mmol/L    Hemoglobin, Arterial 11.5 (L) 14.0 - 17.5 g/dL    Carboxyhemoglobin 1.6 0.0 - 3.0; (Smokers: 0.0 - 9.0) %    Methemoglobin 0.9 0.4 - 1.5 %    Source Of Oxygen PS 8/6     FIO2 40%    POCT Glucose    Collection Time: 08/06/22 11:36 AM   Result Value Ref Range    POCT Bedside  Glucose 185 (H) 70 - 99 mg/dL    POC Test POC    POCT Glucose    Collection Time: 08/06/22  5:35 PM   Result Value Ref Range    POCT Bedside Glucose 191 (H) 70 - 99 mg/dL    POC Test POC    Lactic acid, Venous    Collection Time: 08/06/22  5:50 PM   Result Value Ref Range    Lactate 1.2 0.4 - 2.0 mmol/L   Oxygen saturation, venous    Collection Time: 08/06/22  5:50 PM   Result Value Ref Range    Oxygen Saturation, Venous 73 (H) 30 - 60 %   Basic metabolic panel    Collection Time: 08/06/22 11:44 PM   Result Value Ref Range    Sodium 145 (H) 136 - 144 mEQ/L    Potassium 3.7 3.6 - 5.1 mEQ/L    Chloride 116 (H) 98 - 109 mEQ/L    CO2 23 22 - 32 mEQ/L    BUN 47 (H) 8 - 20 mg/dL    Creatinine 1.2 0.8 - 1.3 mg/dL    Glucose 207 (H) 70 - 99 mg/dL    Calcium 7.5 (L) 8.9 - 10.3 mg/dL    eGFR 56.9 (L) >=60.0 mL/min/1.73m*2    Anion Gap 6 3 - 15 mEQ/L   CBC    Collection Time: 08/07/22  4:48 AM   Result Value Ref Range    WBC 16.19 (H) 3.80 - 10.50 K/uL    RBC 3.37 (L) 4.50 - 5.80 M/uL    Hemoglobin 11.6 (L) 13.7 - 17.5 g/dL    Hematocrit 35.2 (L) 40.1 - 51.0 %    .5 (H) 83.0 - 98.0 fL    MCH 34.4 (H) 28.0 - 33.2 pg    MCHC 33.0 32.2 - 36.5 g/dL    RDW 14.8 (H) 11.6 - 14.4 %    Platelets 101 (L) 150 - 350 K/uL    MPV 11.2 9.4 - 12.4 fL   Magnesium    Collection Time: 08/07/22  4:48 AM   Result Value Ref Range    Magnesium 2.2 1.8 - 2.5 mg/dL   Phosphorus    Collection Time: 08/07/22  4:48 AM   Result Value Ref Range    Phosphorus 2.3 (L) 2.4 - 4.7 mg/dL   Protime-INR    Collection Time: 08/07/22  4:48 AM   Result Value Ref Range    PT 17.4 (H) 12.2 - 14.5 sec    INR 1.5     Lactic acid, Venous    Collection Time: 08/07/22  4:48 AM   Result Value Ref Range    Lactate 1.2 0.4 - 2.0 mmol/L   Calcium, ionized    Collection Time: 08/07/22  4:48 AM   Result Value Ref Range    Ionized Calcium, Venous 1.10 (L) 1.15 - 1.27 mmol/L   Oxygen saturation, venous    Collection Time: 08/07/22  4:48 AM   Result Value Ref Range    Oxygen  Saturation, Venous 64 (H) 30 - 60 %   Comprehensive metabolic panel    Collection Time: 08/07/22  4:48 AM   Result Value Ref Range    Sodium 147 (H) 136 - 144 mEQ/L    Potassium 4.0 3.6 - 5.1 mEQ/L    Chloride 118 (H) 98 - 109 mEQ/L    CO2 22 22 - 32 mEQ/L    BUN 48 (H) 8 - 20 mg/dL    Creatinine 1.2 0.8 - 1.3 mg/dL    Glucose 194 (H) 70 - 99 mg/dL    Calcium 7.7 (L) 8.9 - 10.3 mg/dL    AST (SGOT) 33 15 - 41 IU/L    ALT (SGPT) 18 16 - 63 IU/L    Alkaline Phosphatase 61 35 - 126 IU/L    Total Protein 5.1 (L) 6.0 - 8.2 g/dL    Albumin 1.9 (L) 3.4 - 5.0 g/dL    Bilirubin, Total 1.7 (H) 0.3 - 1.2 mg/dL    eGFR 56.9 (L) >=60.0 mL/min/1.73m*2    Anion Gap 7 3 - 15 mEQ/L   Blood Gas, arterial Comprehensive    Collection Time: 08/07/22  4:52 AM   Result Value Ref Range    pH, Arterial 7.40 7.35 - 7.45    pCO2, Arterial 41 35 - 48 mm Hg    pO2, Arterial 256 (H) 83 - 100 mm Hg    HCO3, Arterial 25.3 21.0 - 28.0 mEQ/L    Base Excess, Arterial 0.5 mEQ/L    O2 Sat, Arterial 98 93 - 98 %    TCO2, Arterial 26.7 22.0 - 32.0 mEQ/L    Lactate, Art 1.3 0.4 - 1.6 mmol/L    Glucose, Arterial 186 (H) 70 - 99 mg/dL    Sodium, Arterial 145 136 - 145 mEQ/L    Potassium, Arterial 4.0 3.4 - 4.5 mEQ/L    Chloride, Arterial 114 (H) 98 - 109 mEQ/L    Ionized Calcium, Arterial 1.16 1.15 - 1.27 mmol/L    Hemoglobin, Arterial 12.4 (L) 14.0 - 17.5 g/dL    Carboxyhemoglobin 1.7 0.0 - 3.0; (Smokers: 0.0 - 9.0) %    Methemoglobin 0.6 0.4 - 1.5 %    Source Of Oxygen PRVC/ 40%/6/20/450     FIO2 40%      Serum creatinine: 1.2 mg/dL 08/07/22 0448  Estimated creatinine clearance: 46.2 mL/min  Microbiology Results     Procedure Component Value Units Date/Time    MRSA Screen, Nares Only Nose [[id number]]  (Normal) Collected: 08/02/22 2248    Specimen: Nasal Swab from Nose Updated: 08/03/22 0030     MRSA DNA, Nares Negative    SARS-CoV-2 (COVID-19), PCR Nasopharynx [[id number]]  (Normal) Collected: 08/02/22 1633    Specimen: Nasopharyngeal Swab from Nasopharynx  Updated: 08/02/22 1807    Narrative:      The following orders were created for panel order SARS-CoV-2 (COVID-19), PCR Nasopharynx.  Procedure                               Abnormality         Status                     ---------                               -----------         ------                     SARS-CoV-2 (COVID-19), P...[[id number]]  Normal              Final result                 Please view results for these tests on the individual orders.    SARS-CoV-2 (COVID-19), PCR Nasopharynx [[id number]]  (Normal) Collected: 08/02/22 1633    Specimen: Nasopharyngeal Swab from Nasopharynx Updated: 08/02/22 1807     SARS-CoV-2 (COVID-19) Negative    Narrative:      Testing performed using real-time PCR for detection of COVID-19. EUA approved validation studies performed on site.     Anaerobic Culture / Smear (includes Aerobic Culture) [[id number]] Collected: 08/02/22 1536    Specimen: Abscess Fluid from Buttock, Left Updated: 08/02/22 2247     Gram Stain Result 3+ WBC      2+ Gram positive cocci      2+ Gram positive bacilli    SARS-CoV-2 (COVID-19), PCR Nasopharynx [[id number]]  (Normal) Collected: 07/25/22 1341    Specimen: Nasopharyngeal Swab from Nasopharynx Updated: 07/25/22 1525    Narrative:      The following orders were created for panel order SARS-CoV-2 (COVID-19), PCR Nasopharynx.  Procedure                               Abnormality         Status                     ---------                               -----------         ------                     SARS-CoV-2 (COVID-19), P...[[id number]]  Normal              Final result                 Please view results for these tests on the individual orders.    SARS-CoV-2 (COVID-19), PCR Nasopharynx [[id number]]  (Normal) Collected: 07/25/22 1341    Specimen: Nasopharyngeal Swab from Nasopharynx Updated: 07/25/22 1525     SARS-CoV-2 (COVID-19) Negative    SARS-CoV-2 (COVID-19), PCR Nasopharynx [[id number]]  (Normal) Collected: 07/22/22 2001    Specimen:  Nasopharyngeal Swab from Nasopharynx Updated: 07/22/22 2125    Narrative:      The following orders were created for panel order SARS-CoV-2 (COVID-19), PCR Nasopharynx.  Procedure                               Abnormality         Status                     ---------                               -----------         ------                     SARS-CoV-2 (COVID-19), P...[[id number]]  Normal              Final result                 Please view results for these tests on the individual orders.    SARS-CoV-2 (COVID-19), PCR Nasopharynx [[id number]]  (Normal) Collected: 07/22/22 2001    Specimen: Nasopharyngeal Swab from Nasopharynx Updated: 07/22/22 2125     SARS-CoV-2 (COVID-19) Negative    Narrative:      Testing performed using real-time PCR for detection of COVID-19. EUA approved validation studies performed on site.           IMAGING:  No results found.    Radiology Imaging    XR CHEST 1 VW    Narrative  CLINICAL HISTORY: Ataxia.    --    Impression  Findings suspicious for congestive heart failure.    COMMENT: AP radiograph of the chest was obtained.  We have no prior similar  studies for comparison.  There is cardiomegaly.  There is vascular  congestion/interstitial edema.  There are bilateral effusions.  Findings suggest  congestive heart failure.  Sternal sutures are seen.  There is no pneumothorax.  We have no prior similar studies for comparison.       Lines, Drains, Airways, Wounds:     CVC Triple Lumen 08/02/22 Internal jugular (Active)   Number of days: 1       Peripheral IV (Adult) 07/22/22 Left Forearm (Active)   Number of days: 12       Peripheral IV (Adult) 08/02/22 Right Forearm (Active)   Number of days: 1       Peripheral IV (Adult) 08/02/22 Anterior;Left;Proximal Forearm (Active)   Number of days: 1       NG/OG Tube 08/02/22 Center mouth (Active)   Number of days: 1       Urethral Catheter Temperature probe 16 Fr (Active)   Number of days: 1       ETT  (Active)   Number of days: 1       Arterial Line  08/02/22 Left Radial (Active)   Number of days: 1       Surgical Incision Buttock (Active)   Number of days: 1       PHYSICAL EXAM     General appearance: Intubated and minimally sedated.   Head: normocephalic, without obvious abnormality, atraumatic. ETT in place.   Eyes: negative, no scleral icterus.   Nose: no discharge  Neck: no JVD, symmetrical, trachea midline.    RIJ CVC in place. Left neck with swan odalis catheter in place  Lungs: Mechanical ventilation.   Chest wall: No deformities or palpable masses  Heart: Regular rate and rhythm.   Abdomen: soft, moderately distended, non-tender.   Ostomy pink, protruding, perfused, productive of gas and bowel sweat. No stool.  Genitalia: edematous, erythematous. No streaking or crepitous.    Incisions clean, dry, intact. Erythema stable.   Extremities: extremities warm and well-perfused.   Right A-Line in place. Good waveform.   Pulses: 2+ and symmetric.  Skin: Skin color, texture, turgor normal.  Neurologic: Unable to assess secondary to sedation.       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 male HD#5 s/p EUA, wound debridement for necrotizing soft tissue infection of the perirectal and perineal tissue on 8/2. 3 Days Post-Op    NEURO: Intubated & minimally sedated. On low dose Precedex, Fentanyl. Follows commands    CV: aFib. HR 70-110s. 6 of Levo. MAPS >65. Metop 5mg Q6. PA pressure 35/14    PULM: ETT. 20/500/40/6. Will keep intubated while on multiple pressors. SBT today     GI:  NPO. PPI. LUQ end-colostomy. Productive scant gas, no stool. Continue trickle TFs. Awaiting return of bowel function     : Fc. Cr 1.3. (1.5) UOP of 1.5L yesterday.    Genitalia  erythematous. No streaking or apparent spread of infection. dailing dressing changes    HEME: HgB of 11.6. plts 101 this am. Discuss holding [facility]    ID: WBC: 16.19 from 14.65. NSTI. Clinda, Cefipime. OR Cx: 2+ E. Coli, 2+ streptoccus anginous, 2+ enterobacter cloace. 2+ GPCs, 2+ GPBs.     ENDO: SSI. Q6 POCT.     DVT PPX: SCDs & sq heparin TID    GI PPX:  PPI    DISPOSITION:  SICU.     Please page [phone] or [phone] with any questions or concerns.  Connor Evans MD  8/7/2022  9:01 AM

## 2022-08-08 ENCOUNTER — APPOINTMENT (INPATIENT)
Dept: RADIOLOGY | Facility: HOSPITAL | Age: 87
DRG: 717 | End: 2022-08-08
Payer: MEDICARE

## 2022-08-08 ENCOUNTER — APPOINTMENT (INPATIENT)
Dept: RADIOLOGY | Facility: HOSPITAL | Age: 87
DRG: 717 | End: 2022-08-08
Attending: SURGERY
Payer: MEDICARE

## 2022-08-08 LAB
ALBUMIN SERPL-MCNC: 1.8 G/DL (ref 3.4–5)
ALBUMIN SERPL-MCNC: 1.9 G/DL (ref 3.4–5)
ALP SERPL-CCNC: 63 IU/L (ref 35–126)
ALP SERPL-CCNC: 65 IU/L (ref 35–126)
ALT SERPL-CCNC: 16 IU/L (ref 16–63)
ALT SERPL-CCNC: 16 IU/L (ref 16–63)
ANION GAP SERPL CALC-SCNC: 5 MEQ/L (ref 3–15)
ANION GAP SERPL CALC-SCNC: 7 MEQ/L (ref 3–15)
AST SERPL-CCNC: 32 IU/L (ref 15–41)
AST SERPL-CCNC: 33 IU/L (ref 15–41)
BACTERIA URNS QL MICRO: ABNORMAL /HPF
BASE EXCESS BLDA CALC-SCNC: -1.5 MEQ/L
BASE EXCESS BLDA CALC-SCNC: -7.1 MEQ/L
BILIRUB SERPL-MCNC: 1.4 MG/DL (ref 0.3–1.2)
BILIRUB SERPL-MCNC: 1.6 MG/DL (ref 0.3–1.2)
BILIRUB UR QL STRIP.AUTO: NEGATIVE MG/DL
BUN SERPL-MCNC: 58 MG/DL (ref 8–20)
BUN SERPL-MCNC: 60 MG/DL (ref 8–20)
CA-I BLD-SCNC: 0.91 MMOL/L (ref 1.15–1.27)
CA-I BLD-SCNC: 1.15 MMOL/L (ref 1.15–1.27)
CA-I BLD-SCNC: 1.18 MMOL/L (ref 1.15–1.27)
CALCIUM SERPL-MCNC: 7.7 MG/DL (ref 8.9–10.3)
CALCIUM SERPL-MCNC: 7.8 MG/DL (ref 8.9–10.3)
CHLORIDE BLDA-SCNC: 118 MEQ/L (ref 98–109)
CHLORIDE BLDA-SCNC: 124 MEQ/L (ref 98–109)
CHLORIDE SERPL-SCNC: 118 MEQ/L (ref 98–109)
CHLORIDE SERPL-SCNC: 120 MEQ/L (ref 98–109)
CLARITY UR REFRACT.AUTO: CLEAR
CO2 BLDA-SCNC: 18.9 MEQ/L (ref 22–32)
CO2 BLDA-SCNC: 26.2 MEQ/L (ref 22–32)
CO2 SERPL-SCNC: 24 MEQ/L (ref 22–32)
CO2 SERPL-SCNC: 24 MEQ/L (ref 22–32)
COHGB MFR BLD: 1.6 %
COHGB MFR BLD: 2 %
COLOR UR AUTO: YELLOW
CREAT SERPL-MCNC: 1.1 MG/DL (ref 0.8–1.3)
CREAT SERPL-MCNC: 1.1 MG/DL (ref 0.8–1.3)
ERYTHROCYTE [DISTWIDTH] IN BLOOD BY AUTOMATED COUNT: 15 % (ref 11.6–14.4)
ERYTHROCYTE [DISTWIDTH] IN BLOOD BY AUTOMATED COUNT: 15.2 % (ref 11.6–14.4)
FIO2 ON VENT: ABNORMAL %
FIO2 ON VENT: ABNORMAL %
GFR SERPL CREATININE-BSD FRML MDRD: >60 ML/MIN/1.73M*2
GFR SERPL CREATININE-BSD FRML MDRD: >60 ML/MIN/1.73M*2
GLUCOSE BLD-MCNC: 242 MG/DL (ref 70–99)
GLUCOSE BLD-MCNC: 262 MG/DL (ref 70–99)
GLUCOSE BLD-MCNC: 265 MG/DL (ref 70–99)
GLUCOSE BLD-MCNC: 285 MG/DL (ref 70–99)
GLUCOSE BLDA-MCNC: 248 MG/DL (ref 70–99)
GLUCOSE BLDA-MCNC: 262 MG/DL (ref 70–99)
GLUCOSE SERPL-MCNC: 266 MG/DL (ref 70–99)
GLUCOSE SERPL-MCNC: 314 MG/DL (ref 70–99)
GLUCOSE UR STRIP.AUTO-MCNC: >=1000 MG/DL
GRAM STN SPEC: NORMAL
HCO3 BLDA-SCNC: 19.4 MEQ/L (ref 21–28)
HCO3 BLDA-SCNC: 23.8 MEQ/L (ref 21–28)
HCT VFR BLDCO AUTO: 37.1 % (ref 40.1–51)
HCT VFR BLDCO AUTO: 38.4 % (ref 40.1–51)
HGB BLD-MCNC: 12.2 G/DL (ref 13.7–17.5)
HGB BLD-MCNC: 12.3 G/DL (ref 13.7–17.5)
HGB BLDA OXIMETRY-MCNC: 10.6 G/DL (ref 14–17.5)
HGB BLDA OXIMETRY-MCNC: 12.3 G/DL (ref 14–17.5)
HGB UR QL STRIP.AUTO: NEGATIVE
HYALINE CASTS #/AREA URNS LPF: ABNORMAL /LPF
INHALED O2 CONCENTRATION: ABNORMAL %
INHALED O2 CONCENTRATION: ABNORMAL %
INR PPP: 1.5
KETONES UR STRIP.AUTO-MCNC: NEGATIVE MG/DL
LACTATE BLDA-SCNC: 1 MMOL/L (ref 0.4–1.6)
LACTATE BLDA-SCNC: 1 MMOL/L (ref 0.4–1.6)
LACTATE SERPL-SCNC: 0.9 MMOL/L (ref 0.4–2)
LACTATE SERPL-SCNC: 1 MMOL/L (ref 0.4–2)
LACTATE SERPL-SCNC: 1.3 MMOL/L (ref 0.4–2)
LEUKOCYTE ESTERASE UR QL STRIP.AUTO: NEGATIVE
MAGNESIUM SERPL-MCNC: 2.2 MG/DL (ref 1.8–2.5)
MCH RBC QN AUTO: 33.9 PG (ref 28–33.2)
MCH RBC QN AUTO: 35 PG (ref 28–33.2)
MCHC RBC AUTO-ENTMCNC: 31.8 G/DL (ref 32.2–36.5)
MCHC RBC AUTO-ENTMCNC: 33.2 G/DL (ref 32.2–36.5)
MCV RBC AUTO: 105.7 FL (ref 83–98)
MCV RBC AUTO: 106.7 FL (ref 83–98)
METHGB BLD-SCNC: 0.7 % (ref 0.4–1.5)
METHGB BLD-SCNC: 1 % (ref 0.4–1.5)
MUCOUS THREADS URNS QL MICRO: ABNORMAL /LPF
NITRITE UR QL STRIP.AUTO: NEGATIVE
PCO2 BLDA: 34 MM HG (ref 35–48)
PCO2 BLDA: 47 MM HG (ref 35–48)
PDW BLD AUTO: 11.8 FL (ref 9.4–12.4)
PDW BLD AUTO: 12 FL (ref 9.4–12.4)
PF4 HEPARIN CMPLX IGG SERPL IA: 0.12
PH BLDA: 7.33 [PH] (ref 7.35–7.45)
PH BLDA: 7.33 [PH] (ref 7.35–7.45)
PH UR STRIP.AUTO: 6.5 [PH]
PHOSPHATE SERPL-MCNC: 2.4 MG/DL (ref 2.4–4.7)
PLATELET # BLD AUTO: 101 K/UL (ref 150–350)
PLATELET # BLD AUTO: 95 K/UL (ref 150–350)
PO2 BLDA: 174 MM HG (ref 83–100)
PO2 BLDA: 184 MM HG (ref 83–100)
POCT TEST: ABNORMAL
POTASSIUM BLDA-SCNC: 3 MEQ/L (ref 3.4–4.5)
POTASSIUM BLDA-SCNC: 4.1 MEQ/L (ref 3.4–4.5)
POTASSIUM SERPL-SCNC: 4.2 MEQ/L (ref 3.6–5.1)
POTASSIUM SERPL-SCNC: 4.2 MEQ/L (ref 3.6–5.1)
PROT SERPL-MCNC: 5.2 G/DL (ref 6–8.2)
PROT SERPL-MCNC: 5.3 G/DL (ref 6–8.2)
PROT UR QL STRIP.AUTO: 1
PROTHROMBIN TIME: 17.6 SEC (ref 12.2–14.5)
RBC # BLD AUTO: 3.51 M/UL (ref 4.5–5.8)
RBC # BLD AUTO: 3.6 M/UL (ref 4.5–5.8)
RBC #/AREA URNS HPF: ABNORMAL /HPF
SAO2 % BLDA: 97 % (ref 93–98)
SAO2 % BLDA: 97 % (ref 93–98)
SAO2 % BLDV: 60 % (ref 30–60)
SAO2 % BLDV: 62 % (ref 30–60)
SAO2 % BLDV: 66 % (ref 30–60)
SODIUM BLDA-SCNC: 148 MEQ/L (ref 136–145)
SODIUM BLDA-SCNC: 148 MEQ/L (ref 136–145)
SODIUM SERPL-SCNC: 149 MEQ/L (ref 136–144)
SODIUM SERPL-SCNC: 149 MEQ/L (ref 136–144)
SP GR UR REFRACT.AUTO: 1.02
SQUAMOUS URNS QL MICRO: ABNORMAL /HPF
UROBILINOGEN UR STRIP-ACNC: 0.2 EU/DL
WBC # BLD AUTO: 13.7 K/UL (ref 3.8–10.5)
WBC # BLD AUTO: 16.06 K/UL (ref 3.8–10.5)
WBC #/AREA URNS HPF: ABNORMAL /HPF

## 2022-08-08 PROCEDURE — 85027 COMPLETE CBC AUTOMATED: CPT | Performed by: STUDENT IN AN ORGANIZED HEALTH CARE EDUCATION/TRAINING PROGRAM

## 2022-08-08 PROCEDURE — 71045 X-RAY EXAM CHEST 1 VIEW: CPT

## 2022-08-08 PROCEDURE — 87040 BLOOD CULTURE FOR BACTERIA: CPT

## 2022-08-08 PROCEDURE — 25000000 HC PHARMACY GENERAL: Performed by: SURGERY

## 2022-08-08 PROCEDURE — 81001 URINALYSIS AUTO W/SCOPE: CPT

## 2022-08-08 PROCEDURE — 84132 ASSAY OF SERUM POTASSIUM: CPT | Performed by: STUDENT IN AN ORGANIZED HEALTH CARE EDUCATION/TRAINING PROGRAM

## 2022-08-08 PROCEDURE — 63600000 HC DRUGS/DETAIL CODE: Performed by: STUDENT IN AN ORGANIZED HEALTH CARE EDUCATION/TRAINING PROGRAM

## 2022-08-08 PROCEDURE — 83735 ASSAY OF MAGNESIUM: CPT | Performed by: SURGERY

## 2022-08-08 PROCEDURE — 85610 PROTHROMBIN TIME: CPT | Performed by: SURGERY

## 2022-08-08 PROCEDURE — 83605 ASSAY OF LACTIC ACID: CPT | Performed by: SURGERY

## 2022-08-08 PROCEDURE — 25800000 HC PHARMACY IV SOLUTIONS: Performed by: SURGERY

## 2022-08-08 PROCEDURE — 99233 SBSQ HOSP IP/OBS HIGH 50: CPT | Performed by: INTERNAL MEDICINE

## 2022-08-08 PROCEDURE — 83605 ASSAY OF LACTIC ACID: CPT | Performed by: STUDENT IN AN ORGANIZED HEALTH CARE EDUCATION/TRAINING PROGRAM

## 2022-08-08 PROCEDURE — 94003 VENT MGMT INPAT SUBQ DAY: CPT

## 2022-08-08 PROCEDURE — 82810 BLOOD GASES O2 SAT ONLY: CPT

## 2022-08-08 PROCEDURE — 36415 COLL VENOUS BLD VENIPUNCTURE: CPT

## 2022-08-08 PROCEDURE — 63600000 HC DRUGS/DETAIL CODE: Performed by: SURGERY

## 2022-08-08 PROCEDURE — 87070 CULTURE OTHR SPECIMN AEROBIC: CPT

## 2022-08-08 PROCEDURE — 25800000 HC PHARMACY IV SOLUTIONS: Performed by: STUDENT IN AN ORGANIZED HEALTH CARE EDUCATION/TRAINING PROGRAM

## 2022-08-08 PROCEDURE — 82810 BLOOD GASES O2 SAT ONLY: CPT | Performed by: STUDENT IN AN ORGANIZED HEALTH CARE EDUCATION/TRAINING PROGRAM

## 2022-08-08 PROCEDURE — 63700000 HC SELF-ADMINISTRABLE DRUG

## 2022-08-08 PROCEDURE — 20000000 HC ROOM AND CARE ICU

## 2022-08-08 PROCEDURE — 85027 COMPLETE CBC AUTOMATED: CPT | Performed by: SURGERY

## 2022-08-08 PROCEDURE — 87205 SMEAR GRAM STAIN: CPT

## 2022-08-08 PROCEDURE — 80053 COMPREHEN METABOLIC PANEL: CPT | Performed by: STUDENT IN AN ORGANIZED HEALTH CARE EDUCATION/TRAINING PROGRAM

## 2022-08-08 PROCEDURE — 84100 ASSAY OF PHOSPHORUS: CPT | Performed by: SURGERY

## 2022-08-08 PROCEDURE — 82330 ASSAY OF CALCIUM: CPT | Performed by: SURGERY

## 2022-08-08 RX ORDER — ACETAMINOPHEN 650 MG/20.3ML
650 LIQUID ORAL EVERY 4 HOURS PRN
Status: DISCONTINUED | OUTPATIENT
Start: 2022-08-08 | End: 2022-08-23 | Stop reason: HOSPADM

## 2022-08-08 RX ORDER — INSULIN ASPART 100 [IU]/ML
5-8 INJECTION, SOLUTION INTRAVENOUS; SUBCUTANEOUS
Status: DISCONTINUED | OUTPATIENT
Start: 2022-08-08 | End: 2022-08-09

## 2022-08-08 RX ADMIN — INSULIN ASPART 5 UNITS: 100 INJECTION, SOLUTION INTRAVENOUS; SUBCUTANEOUS at 18:00

## 2022-08-08 RX ADMIN — METOPROLOL TARTRATE 2.5 MG: 5 INJECTION, SOLUTION INTRAVENOUS at 06:06

## 2022-08-08 RX ADMIN — CEFEPIME 2 G: 2 INJECTION, POWDER, FOR SOLUTION INTRAVENOUS at 11:30

## 2022-08-08 RX ADMIN — DEXMEDETOMIDINE 0.6 MCG/KG/HR: 200 INJECTION, SOLUTION INTRAVENOUS at 06:06

## 2022-08-08 RX ADMIN — METOPROLOL TARTRATE 2.5 MG: 5 INJECTION, SOLUTION INTRAVENOUS at 12:41

## 2022-08-08 RX ADMIN — DAKIN'S SOLUTION 0.125% (QUARTER STRENGTH): 0.12 SOLUTION at 08:06

## 2022-08-08 RX ADMIN — METOPROLOL TARTRATE 2.5 MG: 5 INJECTION, SOLUTION INTRAVENOUS at 23:34

## 2022-08-08 RX ADMIN — CEFEPIME 2 G: 2 INJECTION, POWDER, FOR SOLUTION INTRAVENOUS at 23:30

## 2022-08-08 RX ADMIN — HEPARIN SODIUM 5000 UNITS: 5000 INJECTION, SOLUTION INTRAVENOUS; SUBCUTANEOUS at 14:06

## 2022-08-08 RX ADMIN — HYDROMORPHONE HYDROCHLORIDE 0.5 MG: 1 INJECTION, SOLUTION INTRAMUSCULAR; INTRAVENOUS; SUBCUTANEOUS at 02:39

## 2022-08-08 RX ADMIN — PANTOPRAZOLE SODIUM 40 MG: 40 INJECTION, POWDER, FOR SOLUTION INTRAVENOUS at 08:06

## 2022-08-08 RX ADMIN — HYDROMORPHONE HYDROCHLORIDE 0.5 MG: 1 INJECTION, SOLUTION INTRAMUSCULAR; INTRAVENOUS; SUBCUTANEOUS at 18:04

## 2022-08-08 RX ADMIN — INSULIN ASPART 3 UNITS: 100 INJECTION, SOLUTION INTRAVENOUS; SUBCUTANEOUS at 06:15

## 2022-08-08 RX ADMIN — HEPARIN SODIUM 5000 UNITS: 5000 INJECTION, SOLUTION INTRAVENOUS; SUBCUTANEOUS at 06:06

## 2022-08-08 RX ADMIN — Medication 14 MCG/MIN: at 09:04

## 2022-08-08 RX ADMIN — CHLORHEXIDINE GLUCONATE 15 ML: 1.2 SOLUTION ORAL at 21:53

## 2022-08-08 RX ADMIN — Medication 7 MCG/MIN: at 20:10

## 2022-08-08 RX ADMIN — INSULIN ASPART 8 UNITS: 100 INJECTION, SOLUTION INTRAVENOUS; SUBCUTANEOUS at 11:43

## 2022-08-08 RX ADMIN — METOPROLOL TARTRATE 2.5 MG: 5 INJECTION, SOLUTION INTRAVENOUS at 17:56

## 2022-08-08 RX ADMIN — SODIUM CHLORIDE, POTASSIUM CHLORIDE, SODIUM LACTATE AND CALCIUM CHLORIDE 500 ML: 600; 310; 30; 20 INJECTION, SOLUTION INTRAVENOUS at 09:01

## 2022-08-08 RX ADMIN — HEPARIN SODIUM 5000 UNITS: 5000 INJECTION, SOLUTION INTRAVENOUS; SUBCUTANEOUS at 21:52

## 2022-08-08 RX ADMIN — INSULIN ASPART 5 UNITS: 100 INJECTION, SOLUTION INTRAVENOUS; SUBCUTANEOUS at 23:33

## 2022-08-08 RX ADMIN — COLLAGENASE SANTYL 1 APPLICATION.: 250 OINTMENT TOPICAL at 22:43

## 2022-08-08 RX ADMIN — CHLORHEXIDINE GLUCONATE 15 ML: 1.2 SOLUTION ORAL at 09:09

## 2022-08-08 RX ADMIN — DEXMEDETOMIDINE 0.4 MCG/KG/HR: 200 INJECTION, SOLUTION INTRAVENOUS at 20:10

## 2022-08-08 RX ADMIN — DAKIN'S SOLUTION 0.125% (QUARTER STRENGTH): 0.12 SOLUTION at 02:22

## 2022-08-08 RX ADMIN — COLLAGENASE SANTYL 1 APPLICATION.: 250 OINTMENT TOPICAL at 08:06

## 2022-08-08 RX ADMIN — CLINDAMYCIN PHOSPHATE 900 MG: 900 INJECTION, SOLUTION INTRAVENOUS at 08:05

## 2022-08-08 RX ADMIN — ACETAMINOPHEN 650 MG: 160 SOLUTION ORAL at 21:52

## 2022-08-08 RX ADMIN — Medication 4 MCG/MIN: at 15:22

## 2022-08-08 RX ADMIN — INSULIN ASPART 3 UNITS: 100 INJECTION, SOLUTION INTRAVENOUS; SUBCUTANEOUS at 00:09

## 2022-08-08 NOTE — PROGRESS NOTES
Cardiology Progress Note   [facility]    [facility]  [facility] [address]  [address] [place] [address]    TEL  [phone]  [facility]       Patient: Samy Elena  MRN: [id number]  : 10/6/1930  Admission Date: 2022   Consult Date: 22  Reason for Consult: H/o HFrEF, PH, atrial fibrillation -postop management   Outpatient Cardiologist: Dr. Yovanny Cruz ( office visit 2022)     Interval History:   Seen and examined this am.     He had increasing oxygen requirements after wean    HPI:   Samy Elena is a 89 y.o. 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    Cardiology is being consulted for management recommendations given his extensive cardiac history.     Her chart review, the patient had a recent admission at the end of July with changes in speech. CVA was ruled out at the time of that admission .symptoms was attributed to  postural/orthostatic changes related to symptomatic hypotension. Patient was discharged to rehabilitation unit where he developed  buttocks and pubic pain.He was referred to be seen by surgery as outpatient that lead to current admission.       Past Medical History:   Diagnosis Date   • Atrial fibrillation (CMS/HCC)    • Disease of thyroid gland    • GI (gastrointestinal bleed)    • Hypertension    • Myocardial infarction (CMS/HCC)           Past Surgical History:   Procedure Laterality Date   • BYPASS GRAFT         Social History     Socioeconomic History   • Marital status:      Spouse name: Not on file   • Number of children: Not on file   • Years of education: Not on file   • Highest education level: Not on file   Occupational History   • Not on file   Tobacco Use   • Smoking status: Never Smoker    • Smokeless tobacco: Never Used   Substance and Sexual Activity   • Alcohol use: Not on file   • Drug use: Not on file   • Sexual activity: Not on file   Other Topics Concern   • Not on file   Social History Narrative   • Not on file     Social Determinants of Health     Financial Resource Strain: Not on file   Food Insecurity: No Food Insecurity   • Worried About Running Out of Food in the Last Year: Never true   • Ran Out of Food in the Last Year: Never true   Transportation Needs: Not on file   Physical Activity: Not on file   Stress: Not on file   Social Connections: Not on file   Intimate Partner Violence: Not on file   Housing Stability: Not on file        History reviewed. No pertinent family history.     Jardiance [empagliflozin]    Current Outpatient Medications   Medication Instructions   • acetaminophen (TYLENOL) 325 mg tablet oral   • bisacodyL (DULCOLAX) 10 mg suppository rectal   • clopidogreL (PLAVIX) 75 mg, oral, Daily   • coenzyme Q10 (COQ10) 100 mg, oral, Daily   • empagliflozin (JARDIANCE) 10 mg, oral, Daily   • levothyroxine (SYNTHROID) 100 mcg, oral, Daily (6:30a)   • metoprolol succinate XL (TOPROL-XL) 100 mg, oral, Daily   • multivitamin (THERAGRAN) tablet oral, Daily   • sacubitriL-valsartan (ENTRESTO) 49-51 mg per tablet 1 tablet, oral, 2 times daily   • sildenafiL (pulm.hypertension) (REVATIO) 20 mg, oral, 3 times daily   • simvastatin (ZOCOR) 10 mg, oral, Nightly   • spironolactone (ALDACTONE) 25 mg, oral, Daily   • torsemide (DEMADEX) 10-20 mg, oral, Daily, 10 mg if weight is 176-181 lbs, 20 mg if weight is >181 lbs       Scheduled Meds:  • cefepime  2 g intravenous q12h INT   • chlorhexidine  15 mL Mouth/Throat 2 times per day   • collagenase  1 application Topical Daily   • heparin (porcine)  5,000 Units subcutaneous q8h IVÁN   • insulin aspart U-100  5-8 Units subcutaneous q6h INT   • metoprolol  2.5 mg intravenous q6h INT   • pantoprazole  40 mg intravenous q24h   • sodium  "hypochlorite   Topical Daily     Continuous Infusions:  • dexmedetomidine in 0.9 % NaCl  0.2-1.5 mcg/kg/hr 0.2 mcg/kg/hr (08/08/22 1000)   • DOBUTamine  2.5 mcg/kg/min Stopped (08/07/22 0322)   • fentaNYL  0-200 mcg/hr 50 mcg/hr (08/08/22 1000)   • norepinephrine  0.5-90 mcg/min 14 mcg/min (08/08/22 1000)     PRN Meds:.•  acetaminophen  •  glucose **OR** dextrose **OR** glucagon **OR** dextrose 50 % in water (D50)  •  glucose **OR** dextrose **OR** glucagon **OR** dextrose 50 % in water (D50)  •  fentaNYL **AND** fentaNYL  •  HYDROmorphone      Intake/Output Summary (Last 24 hours) at 8/8/2022 1106  Last data filed at 8/8/2022 1000  Gross per 24 hour   Intake 2505.22 ml   Output 1870 ml   Net 635.22 ml        Weights (last 7 days)     Date/Time Weight Drug Calculation Weight (Dosing Weight)    08/03/22 0944 80.3 kg (177 lb) --    08/03/22 0300 -- 80.7 kg (177 lb 14.6 oz)    08/02/22 2334 80.7 kg (177 lb 14.6 oz) --    08/02/22 1610 80.7 kg (178 lb) --           Wt Readings from Last 3 Encounters:   08/03/22 80.3 kg (177 lb)   08/02/22 80.3 kg (177 lb)   07/25/22 81.1 kg (178 lb 11.2 oz)        REVIEW OF SYSTEMS:    A complete review of systems limited by sedation.    PHYSICAL EXAMINATION:  Visit Vitals  BP (!) 84/49   Pulse 81   Temp 37.1 °C (98.8 °F) (Bladder)   Resp (!) 29   Ht 1.854 m (6' 1\")   Wt 80.3 kg (177 lb)   SpO2 100%   BMI 23.35 kg/m²     Body surface area is 2.03 meters squared.  Body mass index is 23.35 kg/m².  General: intubated and sedated   HEENT: No corneal arcus or xanthelasmas.  Sclerae are anicteric.   Neck: CVC on the right. [id number]  Heart: s1s2 audible, no significant murmurs , irregular   Chest: Symmetrical.  Lungs: intubated. Breathing sounds audible bl .  Abdomen: soft, nt   Extremities: No cyanosis or clubbing.+1  lower extremity edema.  Distal pulses are easily palpable.  Skin: Warm and dry and well perfused.  Neurologic:  na  Psychiatric:na    Labs   Results from last 7 days   Lab Units " 08/08/22  0518 08/07/22  1827 08/07/22  0448   SODIUM mEQ/L 149* 147* 147*   POTASSIUM mEQ/L 4.2 3.9 4.0   CHLORIDE mEQ/L 120* 115* 118*   CO2 mEQ/L 24 26 22   BUN mg/dL 58* 55* 48*   CREATININE mg/dL 1.1 1.1 1.2   CALCIUM mg/dL 7.7* 7.6* 7.7*   ALBUMIN g/dL 1.8* 1.8* 1.9*   BILIRUBIN TOTAL mg/dL 1.6* 1.6* 1.7*   ALK PHOS IU/L 63 59 61   ALT IU/L 16 15* 18   AST IU/L 33 31 33   GLUCOSE mg/dL 266* 276* 194*       No results found for: HSTROPONINI    Results from last 7 days   Lab Units 08/08/22 0518 08/07/22 0448 08/06/22  0609   WBC K/uL 13.70* 16.19* 14.65*   HEMOGLOBIN g/dL 12.3* 11.6* 11.7*   HEMATOCRIT % 37.1* 35.2* 34.5*   PLATELETS K/uL 95* 101* 84*       Lab Results   Component Value Date    CHOL 94 07/23/2022    HDL 23 (L) 07/23/2022    LDLCALC 63 07/23/2022    TRIG 41 07/23/2022    TSH 4.72 08/01/2022    HGBA1C 6.3 (H) 07/23/2022       Outside records reviewed..    Imaging  CXR reviewed     Cardiac Studies  TTE 04/13/2022 ( outside report , images not available)     •  A complete transthoracic echocardiogram (including 2D, color flow   Doppler, spectral Doppler and M-mode imaging) was performed using the   standard protocol. The study quality was adequate and poor.  •  The left ventricle is normal in size. Endocardium is incompletely   visualized and segmental wall motion abnormalities cannot be excluded.   Moderately decreased left ventricular ejection fraction. The left   ventricular ejection fraction by visual estimate is 35% to 40%.  •  There is indeterminate diastolic function.  •  The right ventricle is severely dilated. There is moderately to   severely decreased systolic function of the right ventricle.  •  The left atrium is moderately dilated.  •  The right atrium is severely dilated.  •  There is trivial mitral valve leaflet thickening. There is trace mitral   regurgitation. There is no mitral stenosis.  •  The aortic valve is trileaflet. There is mild aortic valve thickening.   There is mild  aortic valve calcification. There is no aortic stenosis.   There is mild aortic regurgitation.  •  There is moderate to severe tricuspid regurgitation.  •  The inferior vena cava is dilated (diameter >21 mm) and decreases <50%   in size with inspiration, suggesting an elevated right atrial pressure of   15 mmHg (range 10-20 mm Hg).  •  There is no prior study available for comparison at this organization.    Left Ventricle  The left ventricle is normal in size. Endocardium is incompletely visualized and segmental wall motion abnormalities cannot be excluded. The left ventricular ejection fraction by visual estimate is 35% to 40%. Moderately decreased left ventricular ejection fraction. There is indeterminate diastolic function.    Right Ventricle  The right ventricle is severely dilated. Off axis imaging of the right ventricle, but visually systolic function appears moderate to severely reduced.    Left Atrium  The left atrium is moderately dilated.    Right Atrium  The right atrium is severely dilated.    IVC/SVC  The inferior vena cava is dilated (diameter >21 mm) and decreases <50% in size with inspiration, suggesting an elevated right atrial pressure of 15 mmHg (range 10-20 mm Hg).    Mitral Valve  There is trivial mitral valve leaflet thickening. There is no mitral stenosis. There is trace mitral regurgitation.    Tricuspid Valve  There is moderate tricuspid annular dilatation. There is no tricuspid stenosis. There is moderate to severe tricuspid regurgitation.    Aortic Valve  The aortic valve is trileaflet. There is mild aortic valve thickening. There is mild aortic valve calcification. There is no aortic stenosis. There is mild aortic regurgitation.    Pulmonic Valve  There is no pulmonic stenosis. There is mild pulmonic valve regurgitation.    Pericardium  There are echocardiographic findings consistent with fat pad.    Aortic Arch/Descending/Abdominal Aorta  The aortic root is normal in size. The proximal  segment of the ascending aorta is normal in size.     ECG   07/22/2022   Atrial fibrillation   rbbb   twi on precordial leads, present on ecg 07/2022     Telemetry  Atrial fibrillation , rbbb with ventricular response in 70-80 range     Assessment:  This is a 89 y.o. male being consulted for cardiovascular management    #Justin's gangrene:   #Septic shock:   Management per primary service   On abx; id has been following   empagliflozin should be discontinued at the time of discharge. He was recently started on this agent in may 2022      # iCM:  # HFrEF:  # PH on sildenafil as outpatient:    NYHA class III and he has ACC/AHA Stage C as outpatient previously       CVP 14  PCWP 12 this moring       His lvef appears 40-45% with severe RV dilation and diastolic dysfunction.     He is on increasing rate of norepinephrine     He has been improving overall from septic shock standpoint     Hold off on further diuresis   Agree with administration of IVF. Can give another 1 L over the next 6 hours   If he remains on norepinephrine >=12-14 after fluid administration, can use dobutamine starting at a rate of 5 mcg/h/kg  Strict intake and output   Continue holding entresto spironolactone and sildenafil while on vasopressor support and npo; pending further course   Please continue transducing cvp   Please continue to check MVO2 and lactate     # Permanent atrial fibrillation:   [id number] 7     Rates in the 70-90     Not on AC as outpatient given history of GIB   He is on IV metoprolol 2.5 mg q6h    Case  discussed with Dr. Price. Final recommendations are pending attending co-signature. Please page Cardiology at [phone] with any questions.     Sabiha Cordon MD  8/8/2022

## 2022-08-08 NOTE — PROGRESS NOTES
"Infectious Disease Progress Note    Patient Name: Samy Elena  MR#: [id number]  : 10/6/1930  Admission Date: 2022  Date: 22   Time: 7:21 AM   Author: Toni Ramirez MD        Antibiotics:    Anti-infectives (From admission, onward)    Start     Dose/Rate Route Frequency Ordered Stop    22 1200  ceFEPIme (MAXIPIME) 2 g in 100 mL NSS vial in bag         2 g  200 mL/hr over 30 Minutes intravenous Every 12 hours interval 22 1110      22 0730  clindamycin (CLEOCIN) in dextrose 5 % 900 mg         900 mg  100 mL/hr over 30 Minutes intravenous Every 8 hours interval 22 0626            Subjective   Afebrile, improving overall, remains on low-dose pressors.  No bowel function yet although gas noted.  Dressing changed overnight and picture taken showing clean wound with granulation tissue.    Objective     Vital Signs:    Visit Vitals  BP (!) 84/49   Pulse 67   Temp 37.1 °C (98.8 °F) (Bladder)   Resp (!) 28   Ht 1.854 m (6' 1\")   Wt 80.3 kg (177 lb)   SpO2 100%   BMI 23.35 kg/m²     Vent Mode: Pressure regulated volume control  FiO2 (%) (Set):  [40 %] 40 %  S RR:  [20] 20  S VT:  [450 mL] 450 mL  PEEP/CPAP (Set, cmH2O):  [6 cm H20] 6 cm H20  MAP (Observed, cmH2O):  [8] 8     Physical Exam:    General: Elderly man, intubated, sedated, pale and frail looking.  HEENT: NC/AT/ anicteric sclera, pharynx clear, ET tube in place, NG tube in place draining scant bilious material  Neck: supple, no meningismus, right IJ TLC, left IJ Timnath-Niki catheter  Cardiovascular: regular S1/S2, distant sounds, soft systolic murmur.   Respiratory: clear to auscultation anteriorly  GI/Abdomen: Obese,  bowel sounds decreased, colostomy bag in place with gas and a scant serosanguineous material..  Abdomen does not appear tender.  Extremities: Edematous extremities   JOINTS:  No swelling, erythema, or pain  Lymphatics: no lymphadenopathy  Neurology: Intubated and sedated   psych: Intubated and sedated "   skin: There is a large surgical wound in the perineum extending into left ischial area which is clean and no evidence of necrosis noted in pictures.  Previously described blisters in penile shaft have denuded and expose healthy looking  subcutaneous tissue.  No crepitus appreciated, no necrosis.  G.U.: Penis and scrotum edema, no crepitus.  Will in place.  Lines: Right IJ TLC C/D/I.  Left IJ Eunice-Niki catheter      Lines, Drains, Airways, Wounds:  Introducer 08/04/22 Left Internal jugular (Active)   Number of days: 4       CVC Triple Lumen 08/02/22 Internal jugular (Active)   Number of days: 6       Peripheral IV (Adult) 08/02/22 Right Forearm (Active)   Number of days: 6       Peripheral IV (Adult) 08/02/22 Anterior;Left;Proximal Forearm (Active)   Number of days: 6       NG/OG Tube 08/02/22 Center mouth (Active)   Number of days: 6       Colostomy Other (comment) LUQ (Active)   Number of days: 5       Urethral Catheter Temperature probe 16 Fr (Active)   Number of days: 6       ETT  (Active)   Number of days: 6       Arterial Line 08/05/22 Right Radial (Active)   Number of days: 3       Surgical Incision Buttock (Active)   Number of days: 6       Surgical Incision Abdomen (Active)   Number of days: 5       Surgical Incision Perineum (Active)   Number of days: 5       Catheterization Site - Venous Left Internal Jugular 7 Fr. (Active)   Number of days: 4       Labs:    CBC Results       08/08/22 08/07/22 08/06/22     0518 0448 0609    WBC 13.70 16.19 14.65    RBC 3.51 3.37 3.32    HGB 12.3 11.6 11.7    HCT 37.1 35.2 34.5    .7 104.5 103.9    MCH 35.0 34.4 35.2    MCHC 33.2 33.0 33.9    PLT 95 101 84         Comment for PLT at 0609 on 08/06/22: CONSISTENT WITH PREVIOUS RESULTS        CMP Results       08/08/22 08/07/22 08/07/22     0518 1827 0448     147 147    K 4.2 3.9 4.0    Cl 120 115 118    CO2 24 26 22    Glucose 266 276 194    BUN 58 55 48    Creatinine 1.1 1.1 1.2    Calcium 7.7 7.6 7.7     Anion Gap 5 6 7    AST 33 31 33    ALT 16 15 18    Albumin 1.8 1.8 1.9    EGFR >60.0 >60.0 56.9            Micro:  Microbiology Results     Procedure Component Value Units Date/Time    MRSA Screen, Nares Only Nose [[id number]]  (Normal) Collected: 08/02/22 2248    Specimen: Nasal Swab from Nose Updated: 08/03/22 0030     MRSA DNA, Nares Negative    SARS-CoV-2 (COVID-19), PCR Nasopharynx [[id number]]  (Normal) Collected: 08/02/22 1633    Specimen: Nasopharyngeal Swab from Nasopharynx Updated: 08/02/22 1807    Narrative:      The following orders were created for panel order SARS-CoV-2 (COVID-19), PCR Nasopharynx.  Procedure                               Abnormality         Status                     ---------                               -----------         ------                     SARS-CoV-2 (COVID-19), P...[[id number]]  Normal              Final result                 Please view results for these tests on the individual orders.    SARS-CoV-2 (COVID-19), PCR Nasopharynx [[id number]]  (Normal) Collected: 08/02/22 1633    Specimen: Nasopharyngeal Swab from Nasopharynx Updated: 08/02/22 1807     SARS-CoV-2 (COVID-19) Negative    Narrative:      Testing performed using real-time PCR for detection of COVID-19. EUA approved validation studies performed on site.     Blood Culture Blood, Venous [[id number]]  (Normal) Collected: 08/02/22 1628    Specimen: Blood, Venous Updated: 08/07/22 1901     Culture No growth at 120 hours    Blood Culture Blood, Venous [[id number]]  (Normal) Collected: 08/02/22 1628    Specimen: Blood, Venous Updated: 08/07/22 1901     Culture No growth at 120 hours    Anaerobic Culture / Smear (includes Aerobic Culture) [[id number]]  (Abnormal)  (Susceptibility) Collected: 08/02/22 1536    Specimen: Abscess Fluid from Buttock, Left Updated: 08/07/22 0824     Culture **Positive Culture**      2+ Escherichia coli      2+ Streptococcus anginosus      2+ Enterobacter cloacae Complex      Mixed Anaerobic Growth      Gram Stain Result 3+ WBC      2+ Gram positive cocci      2+ Gram positive bacilli    SARS-CoV-2 (COVID-19), PCR Nasopharynx [[id number]]  (Normal) Collected: 07/25/22 1341    Specimen: Nasopharyngeal Swab from Nasopharynx Updated: 07/25/22 1525    Narrative:      The following orders were created for panel order SARS-CoV-2 (COVID-19), PCR Nasopharynx.  Procedure                               Abnormality         Status                     ---------                               -----------         ------                     SARS-CoV-2 (COVID-19), P...[[id number]]  Normal              Final result                 Please view results for these tests on the individual orders.    SARS-CoV-2 (COVID-19), PCR Nasopharynx [[id number]]  (Normal) Collected: 07/25/22 1341    Specimen: Nasopharyngeal Swab from Nasopharynx Updated: 07/25/22 1525     SARS-CoV-2 (COVID-19) Negative    SARS-CoV-2 (COVID-19), PCR Nasopharynx [[id number]]  (Normal) Collected: 07/22/22 2001    Specimen: Nasopharyngeal Swab from Nasopharynx Updated: 07/22/22 2125    Narrative:      The following orders were created for panel order SARS-CoV-2 (COVID-19), PCR Nasopharynx.  Procedure                               Abnormality         Status                     ---------                               -----------         ------                     SARS-CoV-2 (COVID-19), P...[[id number]]  Normal              Final result                 Please view results for these tests on the individual orders.    SARS-CoV-2 (COVID-19), PCR Nasopharynx [[id number]]  (Normal) Collected: 07/22/22 2001    Specimen: Nasopharyngeal Swab from Nasopharynx Updated: 07/22/22 2125     SARS-CoV-2 (COVID-19) Negative    Narrative:      Testing performed using real-time PCR for detection of COVID-19. EUA approved validation studies performed on site.       '      Imaging:    Radiology Imaging    XR CHEST 1 VW    Narrative  --    Impression  Small right greater than left pleural effusions  with underlying atelectasis.  Support devices in place as described below.    COMMENT:    Support lines, tubes, devices, and surgical hardware, material: Endotracheal  tube tip 3.37 m above the pamella. NG/OG tube is seen coursing into the stomach.  Left IJ approach PA catheter with tip overlying the right pulmonary artery.  Right IJ approach central venous catheter with tip projecting at the level of  the upper right atrium. Sternal wires and mediastinal clips typical prior CABG,  with several fractured sternal wires. Monitoring leads overlie the patient.    Lungs and Pleura: See Impression. No pneumothorax.    Cardiovascular and Mediastinum: The cardiomediastinal silhouette is stable  noting postoperative change.    Other: Osseous structures appear unchanged.    CLINICAL HISTORY:   intubated    PROCEDURE: Single frontal view of the chest.    COMPARISON:   Comparison is made to exam on the prior day      Assessment     89 years old male with history of CHF, A. fib not on anticoagulation due to prior history of GI bleed, tricuspid regurgitation, pulmonary hypertension, coronary disease, who was recently discharged from hospital after he presented with slurred speech and hypotension.  Work-up for CVA was negative and it was concluded that he was suffering from orthostatic hypotension.  Medications were adjusted and he was discharged to rehabilitation facility where he developed buttocks and perineum pain.  He was noted to have necrotizing changes during surgical consultation and admitted to hospital for surgical debridement.  Intraoperatively, significant necrotizing process noted.  Multiple cultures were obtained showing polymicrobial process.  Taken for second look without evidence of additional necrotizing changes.       1.  Justin's gangrene status post surgical debridement   2.  CHF, pulmonary edema  3.  Medical therapy with Jardiance- unclear if for coronary artery disease/CHF management or hyperglycemia.  4.   Septic shock-improving  5.  Estimated Creatinine Clearance: 50.4 mL/min (by C-G formula based on SCr of 1.1 mg/dL).        Plan   1.  Discontinue clindamycin and continue therapy with cefepime 2g q12h  2.  Wound site looks good and there is no evidence of progression of necrosis.  Blisters in penis have denuded and look clean.  3.  He will need to complete 14 days of antibiotic therapy in total.  Today is day 6 of treatment.  4.  Discussed with surgical team.  5.  We will follow with you.    More than 35 minutes were spent obtaining a detailed interval history, detailed exam, evaluating this high complexity case with significant medical decision making including continuation of antibiotic therapy, coordination of care with primary team and consultants.      KEN BARNETT MD  INFECTIOUS DISEASES    NOTE: Some or all of the note above was created with the use of dictation software, please note this dictation software can have anomalies in its ability to transcribe. Please contact me directly for anything that seems abnormal for clarification.

## 2022-08-08 NOTE — PLAN OF CARE
Problem: Adult Inpatient Plan of Care  Goal: Plan of Care Review  Outcome: Progressing  Flowsheets (Taken 8/8/2022 1218)  Progress: no change  Plan of Care Reviewed With: other (see comments)     Per care progression rounds, patient is POD # 6 s/p I&D with debridement and POD #5 of washout and diverting colostomy. Continues to be intubated and vented requiring 4 of Levo. Given 500 cc bolus overnight for hypotension. Dispo and YAHIR TBD. CM to continue to follow for dispo planning.... Orquidea Herndon, [phone]

## 2022-08-08 NOTE — PLAN OF CARE
Plan of Care Review  Plan of Care Reviewed With: family, son  Progress: improving  Outcome Summary: Levo @ 4mcg throughout the night, Tube feeds at goal, no stool in ostomy yet but +gas. Dressing change done and new picture uploaded to chart. Febrile to 100.4, pan cultured and CXR done.

## 2022-08-08 NOTE — PATIENT CARE CONFERENCE
Care Progression Rounds Note  Date: 8/8/2022  Time: 9:16 AM     Patient Name: Samy Elena     Medical Record Number: [id number]   YOB: 1930  Sex: Male      Room[facility]Bed: [facility]    Admitting Diagnosis: Necrotizing soft tissue infection [M79.89]   Admit Date/Time: 8/2/2022  4:13 PM    Primary Diagnosis: Justin's gangrene in male  Principal Problem: Justin's gangrene in male    GMLOS: 9.6  Anticipated Discharge Date: 8/12/2022    AM-PAC:  Mobility Score:      Discharge Planning:  Concerns to be Addressed: care coordination/care conferences, discharge planning  Anticipated Discharge Disposition: skilled nursing facility    Barriers to Discharge:  Medical issues not resolved, Test pending    Comments:       Participants:  advanced practice provider, , physical therapy, physician, social work/services

## 2022-08-08 NOTE — PROGRESS NOTES
SURGICAL CRITICAL CARE DAILY PROGRESS NOTE     PATIENT NAME:  Samy Elena YOB: 1930    AGE:  89 y.o.  GENDER: male   MRN:  [id number]  PATIENT #: [id number]     SUBJECTIVE   Remains intubated in ICU. Alert and nods appropriately to questions. Tolerating tube feeds at goal.    Overnight, was febrile to 100.4F. Panculture sent. Fever resolved following administration of Tylenol.     Currently requiring intermittent Levo, up to Levo of 5 following Metoprolol administration. Doubutamine off. Vaso off. Mixed venous O2 60.    Tolerated SBT for 12 hours yesterday. Will attempt extubation today. PRVC 40%/450/20/6 overnight    Has end-colostomy. Pink, perfused, protruding. Has gas. No stool yet.     ID following. On Clinda & Cefepime 2g Q12.      No plan for further OR.       VITAL SIGNS     Temperature:   No data recorded.       Last 24 hours:    Heart Rate:  [54-98] 67  Resp:  [11-29] 28  BP: ()/(49-77) 84/49  FiO2 (%) (Set):  [40 %] 40 %    VENT SETTINGS:   Vent Mode: Pressure regulated volume control  FiO2 (%) (Set):  [40 %] 40 %  S RR:  [20] 20  S VT:  [450 mL] 450 mL  PEEP/CPAP (Set, cmH2O):  [6 cm H20] 6 cm H20  MAP (Observed, cmH2O):  [8] 8    INTAKE & OUTPUT     I/O last 3 completed shifts:  In: 3634 [I.V.:1021.5; NG/GT:2112.5; IV Piggyback:500]  Out: 3650 [Urine:3640; Stool:10]    Intake/Output Summary (Last 24 hours) at 8/8/2022 0740  Last data filed at 8/8/2022 0600  Gross per 24 hour   Intake 2364.25 ml   Output 2135 ml   Net 229.25 ml     No intake/output data recorded.     MEDICATIONS     Infusions:    • dexmedetomidine in 0.9 % NaCl  0.2-1.5 mcg/kg/hr 0.2 mcg/kg/hr (08/08/22 0620)   • DOBUTamine  2.5 mcg/kg/min Stopped (08/07/22 0322)   • fentaNYL  0-200 mcg/hr 50 mcg/hr (08/08/22 0600)   • norepinephrine  0.5-90 mcg/min 5 mcg/min (08/08/22 0633)            Scheduled:   • cefepime  2 g intravenous q12h INT   • chlorhexidine  15 mL Mouth/Throat 2 times per day   • clindamycin  900  mg intravenous q8h INT   • collagenase  1 application Topical Daily   • heparin (porcine)  5,000 Units subcutaneous q8h IVÁN   • insulin aspart U-100  5-8 Units subcutaneous q6h INT   • metoprolol  2.5 mg intravenous q6h INT   • pantoprazole  40 mg intravenous q24h   • sodium hypochlorite   Topical Daily       Antibiotics:  Anti-infectives (From admission, onward)    Start     Dose/Rate Route Frequency Ordered Stop    08/05/22 1200  ceFEPIme (MAXIPIME) 2 g in 100 mL NSS vial in bag         2 g  200 mL/hr over 30 Minutes intravenous Every 12 hours interval 08/05/22 1110      08/03/22 0730  clindamycin (CLEOCIN) in dextrose 5 % 900 mg         900 mg  100 mL/hr over 30 Minutes intravenous Every 8 hours interval 08/03/22 0626            PRN:     acetaminophen, 650 mg, q4h PRN  glucose, 16-32 g of dextrose, PRN   Or  dextrose, 15-30 g of dextrose, PRN   Or  glucagon, 1 mg, PRN   Or  dextrose 50 % in water (D50), 25 mL, PRN  glucose, 16-32 g of dextrose, PRN   Or  dextrose, 15-30 g of dextrose, PRN   Or  glucagon, 1 mg, PRN   Or  dextrose 50 % in water (D50), 25 mL, PRN  fentaNYL, 25 mcg, q5 min PRN  HYDROmorphone, 0.5 mg, q3h PRN         DIAGNOSTIC DATA     LABS:  Recent Results (from the past 24 hour(s))   Blood gas, arterial    Collection Time: 08/07/22  9:49 AM   Result Value Ref Range    pH, Arterial 7.43 7.35 - 7.45    pCO2, Arterial 36 35 - 48 mm Hg    pO2, Arterial 248 (H) 83 - 100 mm Hg    Base Excess, Arterial -0.1 mEQ/L    HCO3, Arterial 24.9 21.0 - 28.0 mEQ/L    Source Of Oxygen PSV     FIO2 40/8/6    POCT Glucose    Collection Time: 08/07/22 12:55 PM   Result Value Ref Range    POCT Bedside Glucose 195 (H) 70 - 99 mg/dL    POC Test POC    POCT Glucose    Collection Time: 08/07/22  6:25 PM   Result Value Ref Range    POCT Bedside Glucose 226 (H) 70 - 99 mg/dL    POC Test POC    Lactic acid, Venous    Collection Time: 08/07/22  6:27 PM   Result Value Ref Range    Lactate 1.3 0.4 - 2.0 mmol/L   Oxygen saturation,  venous    Collection Time: 08/07/22  6:27 PM   Result Value Ref Range    Oxygen Saturation, Venous 48 30 - 60 %   Comprehensive metabolic panel    Collection Time: 08/07/22  6:27 PM   Result Value Ref Range    Sodium 147 (H) 136 - 144 mEQ/L    Potassium 3.9 3.6 - 5.1 mEQ/L    Chloride 115 (H) 98 - 109 mEQ/L    CO2 26 22 - 32 mEQ/L    BUN 55 (H) 8 - 20 mg/dL    Creatinine 1.1 0.8 - 1.3 mg/dL    Glucose 276 (H) 70 - 99 mg/dL    Calcium 7.6 (L) 8.9 - 10.3 mg/dL    AST (SGOT) 31 15 - 41 IU/L    ALT (SGPT) 15 (L) 16 - 63 IU/L    Alkaline Phosphatase 59 35 - 126 IU/L    Total Protein 5.1 (L) 6.0 - 8.2 g/dL    Albumin 1.8 (L) 3.4 - 5.0 g/dL    Bilirubin, Total 1.6 (H) 0.3 - 1.2 mg/dL    eGFR >60.0 >=60.0 mL/min/1.73m*2    Anion Gap 6 3 - 15 mEQ/L   POCT Glucose    Collection Time: 08/08/22 12:09 AM   Result Value Ref Range    POCT Bedside Glucose 265 (H) 70 - 99 mg/dL    POC Test POC    UA Hold for UC (Macro)    Collection Time: 08/08/22 12:42 AM   Result Value Ref Range    Color, Urine Yellow Yellow, Colorless    Clarity, Urine Clear Clear    Specific Gravity, Urine 1.025 1.005 - 1.030    pH, Urine 6.5 4.5 - 8.0    Leukocyte Esterase Negative Negative    Nitrite, Urine Negative Negative    Protein, Urine +1 (A) Negative    Glucose, Urine >=1000 (A) Negative mg/dL    Ketones, Urine Negative Negative mg/dL    Urobilinogen, Urine 0.2 <2.0 EU/dL EU/dL    Bilirubin, Urine Negative Negative mg/dL    Blood, Urine Negative Negative   UA Microscopic    Collection Time: 08/08/22 12:42 AM   Result Value Ref Range    RBC, Urine 0 TO 4 0 TO 4 /HPF    WBC, Urine 0 TO 3 0 TO 3 /HPF    Squamous Epithelial None Seen None Seen /hpf    Hyaline Cast None Seen None Seen /lpf    Bacteria, Urine Rare (A) None Seen /HPF    MUCUSUA Rare (A) None Seen /LPF   Blood Gas, arterial Comprehensive    Collection Time: 08/08/22  5:14 AM   Result Value Ref Range    pH, Arterial 7.33 (L) 7.35 - 7.45    pCO2, Arterial 47 35 - 48 mm Hg    pO2, Arterial 174  (H) 83 - 100 mm Hg    HCO3, Arterial 23.8 21.0 - 28.0 mEQ/L    Base Excess, Arterial -1.5 mEQ/L    O2 Sat, Arterial 97 93 - 98 %    TCO2, Arterial 26.2 22.0 - 32.0 mEQ/L    Lactate, Art 1.0 0.4 - 1.6 mmol/L    Glucose, Arterial 262 (H) 70 - 99 mg/dL    Sodium, Arterial 148 (H) 136 - 145 mEQ/L    Potassium, Arterial 4.1 3.4 - 4.5 mEQ/L    Chloride, Arterial 118 (H) 98 - 109 mEQ/L    Ionized Calcium, Arterial 1.15 1.15 - 1.27 mmol/L    Hemoglobin, Arterial 12.3 (L) 14.0 - 17.5 g/dL    Carboxyhemoglobin 1.6 0.0 - 3.0; (Smokers: 0.0 - 9.0) %    Methemoglobin 0.7 0.4 - 1.5 %    Source Of Oxygen PRVC 40%/6/20/450     FIO2 40%    CBC    Collection Time: 08/08/22  5:18 AM   Result Value Ref Range    WBC 13.70 (H) 3.80 - 10.50 K/uL    RBC 3.51 (L) 4.50 - 5.80 M/uL    Hemoglobin 12.3 (L) 13.7 - 17.5 g/dL    Hematocrit 37.1 (L) 40.1 - 51.0 %    .7 (H) 83.0 - 98.0 fL    MCH 35.0 (H) 28.0 - 33.2 pg    MCHC 33.2 32.2 - 36.5 g/dL    RDW 15.0 (H) 11.6 - 14.4 %    Platelets 95 (L) 150 - 350 K/uL    MPV 11.8 9.4 - 12.4 fL   Magnesium    Collection Time: 08/08/22  5:18 AM   Result Value Ref Range    Magnesium 2.2 1.8 - 2.5 mg/dL   Phosphorus    Collection Time: 08/08/22  5:18 AM   Result Value Ref Range    Phosphorus 2.4 2.4 - 4.7 mg/dL   Protime-INR    Collection Time: 08/08/22  5:18 AM   Result Value Ref Range    PT 17.6 (H) 12.2 - 14.5 sec    INR 1.5     Lactic acid, Venous    Collection Time: 08/08/22  5:18 AM   Result Value Ref Range    Lactate 0.9 0.4 - 2.0 mmol/L   Calcium, ionized    Collection Time: 08/08/22  5:18 AM   Result Value Ref Range    Ionized Calcium, Venous 1.18 1.15 - 1.27 mmol/L   Oxygen saturation, venous    Collection Time: 08/08/22  5:18 AM   Result Value Ref Range    Oxygen Saturation, Venous 60 30 - 60 %   Comprehensive metabolic panel    Collection Time: 08/08/22  5:18 AM   Result Value Ref Range    Sodium 149 (H) 136 - 144 mEQ/L    Potassium 4.2 3.6 - 5.1 mEQ/L    Chloride 120 (H) 98 - 109 mEQ/L     CO2 24 22 - 32 mEQ/L    BUN 58 (H) 8 - 20 mg/dL    Creatinine 1.1 0.8 - 1.3 mg/dL    Glucose 266 (H) 70 - 99 mg/dL    Calcium 7.7 (L) 8.9 - 10.3 mg/dL    AST (SGOT) 33 15 - 41 IU/L    ALT (SGPT) 16 16 - 63 IU/L    Alkaline Phosphatase 63 35 - 126 IU/L    Total Protein 5.2 (L) 6.0 - 8.2 g/dL    Albumin 1.8 (L) 3.4 - 5.0 g/dL    Bilirubin, Total 1.6 (H) 0.3 - 1.2 mg/dL    eGFR >60.0 >=60.0 mL/min/1.73m*2    Anion Gap 5 3 - 15 mEQ/L     Serum creatinine: 1.1 mg/dL 08/08/22 0518  Estimated creatinine clearance: 50.4 mL/min  Microbiology Results     Procedure Component Value Units Date/Time    MRSA Screen, Nares Only Nose [[id number]]  (Normal) Collected: 08/02/22 2248    Specimen: Nasal Swab from Nose Updated: 08/03/22 0030     MRSA DNA, Nares Negative    SARS-CoV-2 (COVID-19), PCR Nasopharynx [[id number]]  (Normal) Collected: 08/02/22 1633    Specimen: Nasopharyngeal Swab from Nasopharynx Updated: 08/02/22 1807    Narrative:      The following orders were created for panel order SARS-CoV-2 (COVID-19), PCR Nasopharynx.  Procedure                               Abnormality         Status                     ---------                               -----------         ------                     SARS-CoV-2 (COVID-19), P...[[id number]]  Normal              Final result                 Please view results for these tests on the individual orders.    SARS-CoV-2 (COVID-19), PCR Nasopharynx [[id number]]  (Normal) Collected: 08/02/22 1633    Specimen: Nasopharyngeal Swab from Nasopharynx Updated: 08/02/22 1807     SARS-CoV-2 (COVID-19) Negative    Narrative:      Testing performed using real-time PCR for detection of COVID-19. EUA approved validation studies performed on site.     Anaerobic Culture / Smear (includes Aerobic Culture) [[id number]] Collected: 08/02/22 1536    Specimen: Abscess Fluid from Buttock, Left Updated: 08/02/22 0844     Gram Stain Result 3+ WBC      2+ Gram positive cocci      2+ Gram positive bacilli     SARS-CoV-2 (COVID-19), PCR Nasopharynx [[id number]]  (Normal) Collected: 07/25/22 1341    Specimen: Nasopharyngeal Swab from Nasopharynx Updated: 07/25/22 1525    Narrative:      The following orders were created for panel order SARS-CoV-2 (COVID-19), PCR Nasopharynx.  Procedure                               Abnormality         Status                     ---------                               -----------         ------                     SARS-CoV-2 (COVID-19), P...[[id number]]  Normal              Final result                 Please view results for these tests on the individual orders.    SARS-CoV-2 (COVID-19), PCR Nasopharynx [[id number]]  (Normal) Collected: 07/25/22 1341    Specimen: Nasopharyngeal Swab from Nasopharynx Updated: 07/25/22 1525     SARS-CoV-2 (COVID-19) Negative    SARS-CoV-2 (COVID-19), PCR Nasopharynx [[id number]]  (Normal) Collected: 07/22/22 2001    Specimen: Nasopharyngeal Swab from Nasopharynx Updated: 07/22/22 2125    Narrative:      The following orders were created for panel order SARS-CoV-2 (COVID-19), PCR Nasopharynx.  Procedure                               Abnormality         Status                     ---------                               -----------         ------                     SARS-CoV-2 (COVID-19), P...[[id number]]  Normal              Final result                 Please view results for these tests on the individual orders.    SARS-CoV-2 (COVID-19), PCR Nasopharynx [[id number]]  (Normal) Collected: 07/22/22 2001    Specimen: Nasopharyngeal Swab from Nasopharynx Updated: 07/22/22 2125     SARS-CoV-2 (COVID-19) Negative    Narrative:      Testing performed using real-time PCR for detection of COVID-19. EUA approved validation studies performed on site.           IMAGING:  No results found.    Radiology Imaging    XR CHEST 1 VW    Narrative  CLINICAL HISTORY: Ataxia.    --    Impression  Findings suspicious for congestive heart failure.    COMMENT: AP radiograph of the chest was  obtained.  We have no prior similar  studies for comparison.  There is cardiomegaly.  There is vascular  congestion/interstitial edema.  There are bilateral effusions.  Findings suggest  congestive heart failure.  Sternal sutures are seen.  There is no pneumothorax.  We have no prior similar studies for comparison.       Lines, Drains, Airways, Wounds:     CVC Triple Lumen 08/02/22 Internal jugular (Active)   Number of days: 1       Peripheral IV (Adult) 07/22/22 Left Forearm (Active)   Number of days: 12       Peripheral IV (Adult) 08/02/22 Right Forearm (Active)   Number of days: 1       Peripheral IV (Adult) 08/02/22 Anterior;Left;Proximal Forearm (Active)   Number of days: 1       NG/OG Tube 08/02/22 Center mouth (Active)   Number of days: 1       Urethral Catheter Temperature probe 16 Fr (Active)   Number of days: 1       ETT  (Active)   Number of days: 1       Arterial Line 08/02/22 Left Radial (Active)   Number of days: 1       Surgical Incision Buttock (Active)   Number of days: 1       PHYSICAL EXAM     General appearance: Intubated and minimally sedated.   Head: normocephalic, without obvious abnormality, atraumatic. ETT in place.   Eyes: negative, no scleral icterus.   Nose: no discharge  Neck: no JVD, symmetrical, trachea midline.    RIJ CVC in place. Left neck with swan odalis catheter in place  Lungs: Mechanical ventilation.   Chest wall: No deformities or palpable masses  Heart: Regular rate and rhythm.   Abdomen: soft, moderately distended, non-tender.   Ostomy pink, protruding, perfused, productive of gas and bowel sweat. No stool.  Genitalia: edematous, erythematous. No streaking or crepitous.    Incisions clean, dry, intact. Erythema stable.   Extremities: extremities warm and well-perfused.   Right A-Line in place. Good waveform.   Pulses: 2+ and symmetric.  Skin: Skin color, texture, turgor normal.  Neurologic: Unable to assess secondary to sedation.       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 male HD#6 s/p EUA, wound debridement for necrotizing soft tissue infection of the perirectal and perineal tissue on 8/2. 4 Days Post-Op    NEURO: Intubated & sedated. On Precedex 0.4 & Fentanyl 50. Follows commands.     CV: aFib. HR 70-110s. Intermittent Levo of 0-5 coinciding with administration of lopressor. MAPS >65. Metop 5mg Q6. PA pressure 35/14    PULM: ETT. 20/500/40/6. Will keep intubated while on pressors. SBT today.     GI:  NPO. PPI. LUQ end-colostomy. Productive gas, no stool. Tube feeds  at goal. Awaiting return of bowel function     : Fc. Cr 1.1. (1.5). >1.0cc/kg/hr UOP.   Genitalia erythematous. No streaking or apparent spread of infection. Daily dressing changes. Will discuss VAC.     HEME: HgB of 12.3. plts 95 this am.    ID: WBC: 13.7 (from 16.19). Febrile overnight to 100.4F, Pancultures.  NSTI. Clinda, Cefipime. OR Cx: 2+ E. Coli, 2+ streptoccus anginous, 2+ enterobacter cloace. 2+ GPCs, 2+ GPBs.     ENDO: SSI. Q6 POCT.     DVT PPX: SCDs & sq heparin TID    GI PPX:  PPI    DISPOSITION:  SICU.     Please page [phone] or [phone] with any questions or concerns.  Connor Evans MD  8/8/2022  7:40 AM

## 2022-08-09 ENCOUNTER — APPOINTMENT (INPATIENT)
Dept: RADIOLOGY | Facility: HOSPITAL | Age: 87
DRG: 717 | End: 2022-08-09
Attending: SURGERY
Payer: MEDICARE

## 2022-08-09 LAB
ALBUMIN SERPL-MCNC: 1.8 G/DL (ref 3.4–5)
ALBUMIN SERPL-MCNC: 1.8 G/DL (ref 3.4–5)
ALP SERPL-CCNC: 63 IU/L (ref 35–126)
ALP SERPL-CCNC: 65 IU/L (ref 35–126)
ALT SERPL-CCNC: 16 IU/L (ref 16–63)
ALT SERPL-CCNC: 17 IU/L (ref 16–63)
ANION GAP SERPL CALC-SCNC: 4 MEQ/L (ref 3–15)
ANION GAP SERPL CALC-SCNC: 4 MEQ/L (ref 3–15)
AST SERPL-CCNC: 31 IU/L (ref 15–41)
AST SERPL-CCNC: 33 IU/L (ref 15–41)
BASE EXCESS BLDA CALC-SCNC: -0.4 MEQ/L
BILIRUB SERPL-MCNC: 1 MG/DL (ref 0.3–1.2)
BILIRUB SERPL-MCNC: 1.3 MG/DL (ref 0.3–1.2)
BUN SERPL-MCNC: 56 MG/DL (ref 8–20)
BUN SERPL-MCNC: 59 MG/DL (ref 8–20)
CA-I BLD-SCNC: 1.15 MMOL/L (ref 1.15–1.27)
CA-I BLD-SCNC: 1.19 MMOL/L (ref 1.15–1.27)
CALCIUM SERPL-MCNC: 7.8 MG/DL (ref 8.9–10.3)
CALCIUM SERPL-MCNC: 7.8 MG/DL (ref 8.9–10.3)
CHLORIDE BLDA-SCNC: 121 MEQ/L (ref 98–109)
CHLORIDE SERPL-SCNC: 121 MEQ/L (ref 98–109)
CHLORIDE SERPL-SCNC: 123 MEQ/L (ref 98–109)
CO2 BLDA-SCNC: 26.1 MEQ/L (ref 22–32)
CO2 SERPL-SCNC: 24 MEQ/L (ref 22–32)
CO2 SERPL-SCNC: 27 MEQ/L (ref 22–32)
COHGB MFR BLD: 1.7 %
CREAT SERPL-MCNC: 1 MG/DL (ref 0.8–1.3)
CREAT SERPL-MCNC: 1 MG/DL (ref 0.8–1.3)
ERYTHROCYTE [DISTWIDTH] IN BLOOD BY AUTOMATED COUNT: 15.1 % (ref 11.6–14.4)
FIO2 ON VENT: ABNORMAL %
GFR SERPL CREATININE-BSD FRML MDRD: >60 ML/MIN/1.73M*2
GFR SERPL CREATININE-BSD FRML MDRD: >60 ML/MIN/1.73M*2
GLUCOSE BLD-MCNC: 102 MG/DL (ref 70–99)
GLUCOSE BLD-MCNC: 118 MG/DL (ref 70–99)
GLUCOSE BLD-MCNC: 154 MG/DL (ref 70–99)
GLUCOSE BLD-MCNC: 174 MG/DL (ref 70–99)
GLUCOSE BLD-MCNC: 190 MG/DL (ref 70–99)
GLUCOSE BLD-MCNC: 210 MG/DL (ref 70–99)
GLUCOSE BLD-MCNC: 230 MG/DL (ref 70–99)
GLUCOSE BLD-MCNC: 242 MG/DL (ref 70–99)
GLUCOSE BLD-MCNC: 77 MG/DL (ref 70–99)
GLUCOSE BLD-MCNC: 95 MG/DL (ref 70–99)
GLUCOSE BLDA-MCNC: 263 MG/DL (ref 70–99)
GLUCOSE SERPL-MCNC: 160 MG/DL (ref 70–99)
GLUCOSE SERPL-MCNC: 288 MG/DL (ref 70–99)
HCO3 BLDA-SCNC: 24.6 MEQ/L (ref 21–28)
HCT VFR BLDCO AUTO: 37.8 % (ref 40.1–51)
HGB BLD-MCNC: 12.3 G/DL (ref 13.7–17.5)
HGB BLDA OXIMETRY-MCNC: 11.9 G/DL (ref 14–17.5)
INHALED O2 CONCENTRATION: ABNORMAL %
INR PPP: 1.5
LACTATE BLDA-SCNC: 1 MMOL/L (ref 0.4–1.6)
LACTATE SERPL-SCNC: 0.9 MMOL/L (ref 0.4–2)
LACTATE SERPL-SCNC: 1.2 MMOL/L (ref 0.4–2)
MAGNESIUM SERPL-MCNC: 2.3 MG/DL (ref 1.8–2.5)
MCH RBC QN AUTO: 34.3 PG (ref 28–33.2)
MCHC RBC AUTO-ENTMCNC: 32.5 G/DL (ref 32.2–36.5)
MCV RBC AUTO: 105.3 FL (ref 83–98)
METHGB BLD-SCNC: 0.7 % (ref 0.4–1.5)
PCO2 BLDA: 42 MM HG (ref 35–48)
PDW BLD AUTO: 11.9 FL (ref 9.4–12.4)
PH BLDA: 7.38 [PH] (ref 7.35–7.45)
PHOSPHATE SERPL-MCNC: 1.8 MG/DL (ref 2.4–4.7)
PLATELET # BLD AUTO: 84 K/UL (ref 150–350)
PO2 BLDA: 175 MM HG (ref 83–100)
POCT TEST: ABNORMAL
POCT TEST: NORMAL
POCT TEST: NORMAL
POTASSIUM BLDA-SCNC: 4.5 MEQ/L (ref 3.4–4.5)
POTASSIUM SERPL-SCNC: 4.1 MEQ/L (ref 3.6–5.1)
POTASSIUM SERPL-SCNC: 4.2 MEQ/L (ref 3.6–5.1)
PROT SERPL-MCNC: 5.3 G/DL (ref 6–8.2)
PROT SERPL-MCNC: 5.5 G/DL (ref 6–8.2)
PROTHROMBIN TIME: 17.6 SEC (ref 12.2–14.5)
RBC # BLD AUTO: 3.59 M/UL (ref 4.5–5.8)
SAO2 % BLDA: 98 % (ref 93–98)
SAO2 % BLDV: 60 % (ref 30–60)
SAO2 % BLDV: 72 % (ref 30–60)
SAO2 % BLDV: 97 % (ref 30–60)
SODIUM BLDA-SCNC: 151 MEQ/L (ref 136–145)
SODIUM SERPL-SCNC: 149 MEQ/L (ref 136–144)
SODIUM SERPL-SCNC: 154 MEQ/L (ref 136–144)
WBC # BLD AUTO: 16.57 K/UL (ref 3.8–10.5)

## 2022-08-09 PROCEDURE — 25800000 HC PHARMACY IV SOLUTIONS: Performed by: STUDENT IN AN ORGANIZED HEALTH CARE EDUCATION/TRAINING PROGRAM

## 2022-08-09 PROCEDURE — 25000000 HC PHARMACY GENERAL: Performed by: STUDENT IN AN ORGANIZED HEALTH CARE EDUCATION/TRAINING PROGRAM

## 2022-08-09 PROCEDURE — 63600000 HC DRUGS/DETAIL CODE: Performed by: STUDENT IN AN ORGANIZED HEALTH CARE EDUCATION/TRAINING PROGRAM

## 2022-08-09 PROCEDURE — 63700000 HC SELF-ADMINISTRABLE DRUG: Performed by: SURGERY

## 2022-08-09 PROCEDURE — 82805 BLOOD GASES W/O2 SATURATION: CPT | Performed by: STUDENT IN AN ORGANIZED HEALTH CARE EDUCATION/TRAINING PROGRAM

## 2022-08-09 PROCEDURE — 82330 ASSAY OF CALCIUM: CPT | Performed by: SURGERY

## 2022-08-09 PROCEDURE — 36415 COLL VENOUS BLD VENIPUNCTURE: CPT | Performed by: STUDENT IN AN ORGANIZED HEALTH CARE EDUCATION/TRAINING PROGRAM

## 2022-08-09 PROCEDURE — 85610 PROTHROMBIN TIME: CPT | Performed by: SURGERY

## 2022-08-09 PROCEDURE — 83735 ASSAY OF MAGNESIUM: CPT | Performed by: SURGERY

## 2022-08-09 PROCEDURE — 25000000 HC PHARMACY GENERAL: Performed by: SURGERY

## 2022-08-09 PROCEDURE — 83605 ASSAY OF LACTIC ACID: CPT | Performed by: SURGERY

## 2022-08-09 PROCEDURE — 80053 COMPREHEN METABOLIC PANEL: CPT | Performed by: STUDENT IN AN ORGANIZED HEALTH CARE EDUCATION/TRAINING PROGRAM

## 2022-08-09 PROCEDURE — 94003 VENT MGMT INPAT SUBQ DAY: CPT

## 2022-08-09 PROCEDURE — 25800000 HC PHARMACY IV SOLUTIONS: Performed by: SURGERY

## 2022-08-09 PROCEDURE — 85027 COMPLETE CBC AUTOMATED: CPT | Performed by: SURGERY

## 2022-08-09 PROCEDURE — 84100 ASSAY OF PHOSPHORUS: CPT | Performed by: SURGERY

## 2022-08-09 PROCEDURE — 63600000 HC DRUGS/DETAIL CODE: Performed by: SURGERY

## 2022-08-09 PROCEDURE — 20000000 HC ROOM AND CARE ICU

## 2022-08-09 PROCEDURE — 99233 SBSQ HOSP IP/OBS HIGH 50: CPT | Performed by: INTERNAL MEDICINE

## 2022-08-09 PROCEDURE — 82810 BLOOD GASES O2 SAT ONLY: CPT

## 2022-08-09 PROCEDURE — 82810 BLOOD GASES O2 SAT ONLY: CPT | Performed by: STUDENT IN AN ORGANIZED HEALTH CARE EDUCATION/TRAINING PROGRAM

## 2022-08-09 PROCEDURE — 71045 X-RAY EXAM CHEST 1 VIEW: CPT

## 2022-08-09 RX ORDER — DEXTROSE 50 % IN WATER (D50W) INTRAVENOUS SYRINGE
10-30 AS NEEDED
Status: DISCONTINUED | OUTPATIENT
Start: 2022-08-09 | End: 2022-08-12

## 2022-08-09 RX ORDER — INSULIN ASPART 100 [IU]/ML
8-11 INJECTION, SOLUTION INTRAVENOUS; SUBCUTANEOUS
Status: DISCONTINUED | OUTPATIENT
Start: 2022-08-09 | End: 2022-08-09

## 2022-08-09 RX ADMIN — CEFEPIME 2 G: 2 INJECTION, POWDER, FOR SOLUTION INTRAVENOUS at 23:56

## 2022-08-09 RX ADMIN — HEPARIN SODIUM 5000 UNITS: 5000 INJECTION, SOLUTION INTRAVENOUS; SUBCUTANEOUS at 06:11

## 2022-08-09 RX ADMIN — CHLORHEXIDINE GLUCONATE 15 ML: 1.2 SOLUTION ORAL at 09:13

## 2022-08-09 RX ADMIN — Medication 8 MCG/MIN: at 02:56

## 2022-08-09 RX ADMIN — DEXMEDETOMIDINE 0.8 MCG/KG/HR: 200 INJECTION, SOLUTION INTRAVENOUS at 23:18

## 2022-08-09 RX ADMIN — DEXMEDETOMIDINE 0.8 MCG/KG/HR: 200 INJECTION, SOLUTION INTRAVENOUS at 16:05

## 2022-08-09 RX ADMIN — POTASSIUM PHOSPHATE, MONOBASIC POTASSIUM PHOSPHATE, DIBASIC 15 MMOL: 224; 236 INJECTION, SOLUTION, CONCENTRATE INTRAVENOUS at 10:57

## 2022-08-09 RX ADMIN — SODIUM CHLORIDE 3.64 UNITS/HR: 900 INJECTION INTRAVENOUS at 11:01

## 2022-08-09 RX ADMIN — DAKIN'S SOLUTION 0.125% (QUARTER STRENGTH): 0.12 SOLUTION at 09:12

## 2022-08-09 RX ADMIN — Medication 6 MCG/MIN: at 13:10

## 2022-08-09 RX ADMIN — CHLORHEXIDINE GLUCONATE 15 ML: 1.2 SOLUTION ORAL at 22:09

## 2022-08-09 RX ADMIN — INSULIN ASPART 11 UNITS: 100 INJECTION, SOLUTION INTRAVENOUS; SUBCUTANEOUS at 06:11

## 2022-08-09 RX ADMIN — Medication 75 MCG/HR: at 06:12

## 2022-08-09 RX ADMIN — METOPROLOL TARTRATE 2.5 MG: 5 INJECTION, SOLUTION INTRAVENOUS at 06:11

## 2022-08-09 RX ADMIN — HEPARIN SODIUM 5000 UNITS: 5000 INJECTION, SOLUTION INTRAVENOUS; SUBCUTANEOUS at 21:52

## 2022-08-09 RX ADMIN — COLLAGENASE SANTYL 1 APPLICATION.: 250 OINTMENT TOPICAL at 09:12

## 2022-08-09 RX ADMIN — PANTOPRAZOLE SODIUM 40 MG: 40 INJECTION, POWDER, FOR SOLUTION INTRAVENOUS at 09:12

## 2022-08-09 RX ADMIN — Medication 16 MCG/MIN: at 18:42

## 2022-08-09 RX ADMIN — HEPARIN SODIUM 5000 UNITS: 5000 INJECTION, SOLUTION INTRAVENOUS; SUBCUTANEOUS at 14:42

## 2022-08-09 RX ADMIN — METOPROLOL TARTRATE 2.5 MG: 5 INJECTION, SOLUTION INTRAVENOUS at 18:36

## 2022-08-09 RX ADMIN — CEFEPIME 2 G: 2 INJECTION, POWDER, FOR SOLUTION INTRAVENOUS at 11:11

## 2022-08-09 RX ADMIN — METOPROLOL TARTRATE 2.5 MG: 5 INJECTION, SOLUTION INTRAVENOUS at 13:09

## 2022-08-09 NOTE — PLAN OF CARE
Tolerating TF. See Rd note for reccs   Problem: Adult Inpatient Plan of Care  Goal: Plan of Care Review  Outcome: Progressing     Problem: Feeding Intolerance (Enteral Nutrition)  Goal: Feeding Tolerance  Outcome: Progressing

## 2022-08-09 NOTE — PROGRESS NOTES
"Nutrition Assessment    Recommendations  Continue goal TF of Peptamen AF at 65ml/hr    Pt with calculated 2.6L free water deficit, if concur, recommend increase free water flush to 100ml/hr. Decrease to baseline needs of 45ml/hr when Na wnl     Clinical Course: Patient is a 89 y.o. 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     Past Medical History:   Diagnosis Date   • Atrial fibrillation (CMS/HCC)    • Disease of thyroid gland    • GI (gastrointestinal bleed)    • Hypertension    • Myocardial infarction (CMS/HCC)      Past Surgical History:   Procedure Laterality Date   • BYPASS GRAFT         Reason for Assessment  Reason For Assessment: identified at risk by screening criteria (MST)     [facility] Nutrition Screen Tool  Has patient lost weight without trying?: 2-->Unsure  If yes,how much weight has been lost?: 2-->Unsure  Has patient been eating poorly due to decreased appetite?: 0-->No  [facility] Nutrition Screen Score: 4     Physical Findings  Overall Physical Appearance: on ventilator support  Gastrointestinal: colostomy (90ml)  Tubes: Orogastric tube (50ml)  Skin: edema, surgical incision (3+ gen edema)     RETS18 Physical Appearance  Overall Physical Appearance: on ventilator support  Gastrointestinal: colostomy (90ml)  Tubes: Orogastric tube (50ml)  Skin: edema, surgical incision (3+ gen edema)     Nutrition Order  Nutrition Order: meets nutritional requirements  Nutrition Order Comments: Peptamen AF at 65ml/hr, 50ml free water q 4 hours     Anthropometrics  Height: 185.4 cm (6' 1\")     Current Weight  Weight Method: Bed scale  Weight: 80.3 kg (177 lb)     Ideal Body Weight (IBW)  Ideal Body Weight (IBW) (kg): 84.86  % Ideal Body Weight: 94.61     Body Mass Index (BMI)  BMI (Calculated): 23.4       Labs/Procedures/Meds  Lab Results Reviewed: reviewed, pertinent   BMP Results       08/09/22 08/08/22 08/08/22     0414 1708 0518     149 149    K 4.2 4.2 4.2    Cl " 121 118 120    CO2 24 24 24    Glucose 288 314 266    BUN 59 60 58    Creatinine 1.0 1.1 1.1    Calcium 7.8 7.8 7.7     Medications  Pertinent Medications Reviewed: reviewed, pertinent   Scheduled Meds:  • cefepime  2 g intravenous q12h INT   • chlorhexidine  15 mL Mouth/Throat 2 times per day   • collagenase  1 application Topical Daily   • heparin (porcine)  5,000 Units subcutaneous q8h IVÁN   • insulin aspart U-100  8-11 Units subcutaneous q6h INT   • metoprolol  2.5 mg intravenous q6h INT   • pantoprazole  40 mg intravenous q24h   • sodium hypochlorite   Topical Daily     Continuous Infusions:  • dexmedetomidine in 0.9 % NaCl  0.2-1.5 mcg/kg/hr 0.6 mcg/kg/hr (08/09/22 0700)   • DOBUTamine  2.5 mcg/kg/min Stopped (08/07/22 0322)   • fentaNYL  0-200 mcg/hr 50 mcg/hr (08/09/22 0850)   • norepinephrine  0.5-90 mcg/min 6 mcg/min (08/09/22 0705)     Calorie Requirements  Estimated kCal Needs: Actual Body Weight  Estimated Calorie Need Method: kcal/kg  Calorie/kg Recommended: 22-25  Calorie Recommendations: [id number]     Protein Requirements  Recommended Dosing Weight (Estimated Protein Needs): Actual Body Weight  Est Protein Requirement Amount (gms/kg):  (1.5-2g/kg)  Protein Recommendations: 120-160g     Fluid requirements  2015-2420ml (25-30ml/kg)      Dosing weight  80.7kg    Tube Feeding Assessment  Active Tube Feed Order: Yes  Delivery Method: Continuous per pump  TF Protein Grams: 119 grams  TF Total kCals: 1872 kcals  Projected Tube Feed Volume: 1560  TF Free Water: 1270    Clinical comments:  Pt remains intubated MAP 69 on levophed, sedated on fentanyl and precedex, 40% FIO2, PEEP 6.     Tolerating TF of Peptamen AF at 65ml/hr with 50ml free water flush q4 hours which provides 1872 kcal (23 kcal/kg), 119g protein (1.5 g protein/kg) and 1270ml free water inherent to formula, total of 1570ml daily.    Pt with calculated 2.6L free water deficit. If concur, recommend increase free water flush to 100ml/hr. Decrease  to 45ml/hr when Na wnl for a total of 1997ml daily (25ml/kg),     Goals: meet 100% of needs via TF   Monitor: TF tolerance, plan of care     Recommendations: See above     PES  Statement: PES Statement  Nutrition Diagnosis: Inadequate Energy Intake  Related To:: Decreased ability to consume sufficient energy  As Evidenced By:: intubated/ NPO  Nutritional Needs Met?: Yes                                   Date: 08/09/22  Signature: Suzanne Norris RD

## 2022-08-09 NOTE — PATIENT CARE CONFERENCE
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

## 2022-08-09 NOTE — PROGRESS NOTES
Cardiology Progress Note   [facility]    [facility]  [facility] [address]  [address] [place] [address]    TEL  [phone]  [facility] [place]       Patient: Samy Elena  MRN: [id number]  : 10/6/1930  Admission Date: 2022   Consult Date: 22  Reason for Consult: H/o HFrEF, PH, atrial fibrillation -postop management   Outpatient Cardiologist: Dr. Yovanny Cruz ( office visit 2022)     Interval History:   Seen and examined this am.   He is on a wean.    HPI:   Samy Elena is a 89 y.o. 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    Cardiology is being consulted for management recommendations given his extensive cardiac history.     Her chart review, the patient had a recent admission at the end of July with changes in speech. CVA was ruled out at the time of that admission .symptoms was attributed to  postural/orthostatic changes related to symptomatic hypotension. Patient was discharged to rehabilitation unit where he developed  buttocks and pubic pain.He was referred to be seen by surgery as outpatient that lead to current admission.       Past Medical History:   Diagnosis Date   • Atrial fibrillation (CMS/HCC)    • Disease of thyroid gland    • GI (gastrointestinal bleed)    • Hypertension    • Myocardial infarction (CMS/HCC)           Past Surgical History:   Procedure Laterality Date   • BYPASS GRAFT         Social History     Socioeconomic History   • Marital status:      Spouse name: Not on file   • Number of children: Not on file   • Years of education: Not on file   • Highest education level: Not on file   Occupational History   • Not on file   Tobacco Use   • Smoking status: Never Smoker   • Smokeless tobacco: Never Used    Substance and Sexual Activity   • Alcohol use: Not on file   • Drug use: Not on file   • Sexual activity: Not on file   Other Topics Concern   • Not on file   Social History Narrative   • Not on file     Social Determinants of Health     Financial Resource Strain: Not on file   Food Insecurity: No Food Insecurity   • Worried About Running Out of Food in the Last Year: Never true   • Ran Out of Food in the Last Year: Never true   Transportation Needs: Not on file   Physical Activity: Not on file   Stress: Not on file   Social Connections: Not on file   Intimate Partner Violence: Not on file   Housing Stability: Not on file        History reviewed. No pertinent family history.     Jardiance [empagliflozin]    Current Outpatient Medications   Medication Instructions   • acetaminophen (TYLENOL) 325 mg tablet oral   • bisacodyL (DULCOLAX) 10 mg suppository rectal   • clopidogreL (PLAVIX) 75 mg, oral, Daily   • coenzyme Q10 (COQ10) 100 mg, oral, Daily   • empagliflozin (JARDIANCE) 10 mg, oral, Daily   • levothyroxine (SYNTHROID) 100 mcg, oral, Daily (6:30a)   • metoprolol succinate XL (TOPROL-XL) 100 mg, oral, Daily   • multivitamin (THERAGRAN) tablet oral, Daily   • sacubitriL-valsartan (ENTRESTO) 49-51 mg per tablet 1 tablet, oral, 2 times daily   • sildenafiL (pulm.hypertension) (REVATIO) 20 mg, oral, 3 times daily   • simvastatin (ZOCOR) 10 mg, oral, Nightly   • spironolactone (ALDACTONE) 25 mg, oral, Daily   • torsemide (DEMADEX) 10-20 mg, oral, Daily, 10 mg if weight is 176-181 lbs, 20 mg if weight is >181 lbs       Scheduled Meds:  • cefepime  2 g intravenous q12h INT   • chlorhexidine  15 mL Mouth/Throat 2 times per day   • collagenase  1 application Topical Daily   • heparin (porcine)  5,000 Units subcutaneous q8h IVÁN   • insulin aspart U-100  8-11 Units subcutaneous q6h INT   • metoprolol  2.5 mg intravenous q6h INT   • pantoprazole  40 mg intravenous q24h   • sodium hypochlorite   Topical Daily     Continuous  "Infusions:  • dexmedetomidine in 0.9 % NaCl  0.2-1.5 mcg/kg/hr 0.6 mcg/kg/hr (08/09/22 0700)   • DOBUTamine  2.5 mcg/kg/min Stopped (08/07/22 0322)   • fentaNYL  0-200 mcg/hr 75 mcg/hr (08/09/22 0700)   • norepinephrine  0.5-90 mcg/min 6 mcg/min (08/09/22 0705)     PRN Meds:.•  acetaminophen  •  glucose **OR** dextrose **OR** glucagon **OR** dextrose 50 % in water (D50)  •  glucose **OR** dextrose **OR** glucagon **OR** dextrose 50 % in water (D50)  •  fentaNYL **AND** fentaNYL  •  HYDROmorphone      Intake/Output Summary (Last 24 hours) at 8/9/2022 0747  Last data filed at 8/9/2022 0700  Gross per 24 hour   Intake 3281.26 ml   Output 1945 ml   Net 1336.26 ml        Weights (last 7 days)     Date/Time Weight Drug Calculation Weight (Dosing Weight)    08/03/22 0944 80.3 kg (177 lb) --    08/03/22 0300 -- 80.7 kg (177 lb 14.6 oz)    08/02/22 2334 80.7 kg (177 lb 14.6 oz) --    08/02/22 1610 80.7 kg (178 lb) --           Wt Readings from Last 3 Encounters:   08/03/22 80.3 kg (177 lb)   08/02/22 80.3 kg (177 lb)   07/25/22 81.1 kg (178 lb 11.2 oz)        REVIEW OF SYSTEMS:    A complete review of systems limited by sedation.    PHYSICAL EXAMINATION:  Visit Vitals  BP (!) 151/82 (BP Location: Right upper arm, Patient Position: Lying)   Pulse 90   Temp 37.1 °C (98.8 °F) (Bladder)   Resp (!) 22   Ht 1.854 m (6' 1\")   Wt 80.3 kg (177 lb)   SpO2 100%   BMI 23.35 kg/m²     Body surface area is 2.03 meters squared.  Body mass index is 23.35 kg/m².  General: intubated and sedated   HEENT: No corneal arcus or xanthelasmas.  Sclerae are anicteric.   Neck: CVC on the right. cvp15  Heart: s1s2 audible, no significant murmurs , irregular   Chest: Symmetrical.  Lungs: intubated. Breathing sounds audible bl .  Abdomen: soft, nt   Extremities: No cyanosis or clubbing.no lower extremity edema.  Distal pulses are easily palpable.  Skin: Warm and dry and well perfused.  Neurologic:  na  Psychiatric:na    Labs   Results from last 7 days "   Lab Units 08/09/22  0414 08/08/22  1708 08/08/22  0518   SODIUM mEQ/L 149* 149* 149*   POTASSIUM mEQ/L 4.2 4.2 4.2   CHLORIDE mEQ/L 121* 118* 120*   CO2 mEQ/L 24 24 24   BUN mg/dL 59* 60* 58*   CREATININE mg/dL 1.0 1.1 1.1   CALCIUM mg/dL 7.8* 7.8* 7.7*   ALBUMIN g/dL 1.8* 1.9* 1.8*   BILIRUBIN TOTAL mg/dL 1.3* 1.4* 1.6*   ALK PHOS IU/L 63 65 63   ALT IU/L 17 16 16   AST IU/L 31 32 33   GLUCOSE mg/dL 288* 314* 266*       No results found for: HSTROPONINI    Results from last 7 days   Lab Units 08/09/22  0414 08/08/22  1138 08/08/22  0518   WBC K/uL 16.57* 16.06* 13.70*   HEMOGLOBIN g/dL 12.3* 12.2* 12.3*   HEMATOCRIT % 37.8* 38.4* 37.1*   PLATELETS K/uL 84* 101* 95*       Lab Results   Component Value Date    CHOL 94 07/23/2022    HDL 23 (L) 07/23/2022    LDLCALC 63 07/23/2022    TRIG 41 07/23/2022    TSH 4.72 08/01/2022    HGBA1C 6.3 (H) 07/23/2022       Outside records reviewed..    Imaging  CXR reviewed     Cardiac Studies  TTE 04/13/2022 ( outside report , images not available)     •  A complete transthoracic echocardiogram (including 2D, color flow   Doppler, spectral Doppler and M-mode imaging) was performed using the   standard protocol. The study quality was adequate and poor.  •  The left ventricle is normal in size. Endocardium is incompletely   visualized and segmental wall motion abnormalities cannot be excluded.   Moderately decreased left ventricular ejection fraction. The left   ventricular ejection fraction by visual estimate is 35% to 40%.  •  There is indeterminate diastolic function.  •  The right ventricle is severely dilated. There is moderately to   severely decreased systolic function of the right ventricle.  •  The left atrium is moderately dilated.  •  The right atrium is severely dilated.  •  There is trivial mitral valve leaflet thickening. There is trace mitral   regurgitation. There is no mitral stenosis.  •  The aortic valve is trileaflet. There is mild aortic valve thickening.    There is mild aortic valve calcification. There is no aortic stenosis.   There is mild aortic regurgitation.  •  There is moderate to severe tricuspid regurgitation.  •  The inferior vena cava is dilated (diameter >21 mm) and decreases <50%   in size with inspiration, suggesting an elevated right atrial pressure of   15 mmHg (range 10-20 mm Hg).  •  There is no prior study available for comparison at this organization.    Left Ventricle  The left ventricle is normal in size. Endocardium is incompletely visualized and segmental wall motion abnormalities cannot be excluded. The left ventricular ejection fraction by visual estimate is 35% to 40%. Moderately decreased left ventricular ejection fraction. There is indeterminate diastolic function.    Right Ventricle  The right ventricle is severely dilated. Off axis imaging of the right ventricle, but visually systolic function appears moderate to severely reduced.    Left Atrium  The left atrium is moderately dilated.    Right Atrium  The right atrium is severely dilated.    IVC/SVC  The inferior vena cava is dilated (diameter >21 mm) and decreases <50% in size with inspiration, suggesting an elevated right atrial pressure of 15 mmHg (range 10-20 mm Hg).    Mitral Valve  There is trivial mitral valve leaflet thickening. There is no mitral stenosis. There is trace mitral regurgitation.    Tricuspid Valve  There is moderate tricuspid annular dilatation. There is no tricuspid stenosis. There is moderate to severe tricuspid regurgitation.    Aortic Valve  The aortic valve is trileaflet. There is mild aortic valve thickening. There is mild aortic valve calcification. There is no aortic stenosis. There is mild aortic regurgitation.    Pulmonic Valve  There is no pulmonic stenosis. There is mild pulmonic valve regurgitation.    Pericardium  There are echocardiographic findings consistent with fat pad.    Aortic Arch/Descending/Abdominal Aorta  The aortic root is normal in  size. The proximal segment of the ascending aorta is normal in size.     ECG   07/22/2022   Atrial fibrillation   rbbb   twi on precordial leads, present on ecg 07/2022     Telemetry  Atrial fibrillation , rbbb with ventricular response in 70-80 range     Assessment:  This is a 89 y.o. male being consulted for cardiovascular management    #Justin's gangrene:   #Septic shock:   Management per primary service   On abx; id has been following   empagliflozin should be discontinued at the time of discharge. He was recently started on this agent in may 2022      Repeat BCx 08/09 ngtd    # iCM:  # HFrEF:  # PH on sildenafil as outpatient:    NYHA class III and he has ACC/AHA Stage C as outpatient previously       CVP15  On norepinephrine 7   MVO2 97 % (? )     His lvef appears 40-45% with severe RV dilation and diastolic dysfunction.     He is on increasing rate of norepinephrine     He has been improving overall from septic shock standpoint     Hold off on further diuresis   Hold off on IVF today  Strict intake and output   Continue holding entresto spironolactone and sildenafil while on vasopressor support and npo; pending further course   Please continue transducing cvp   Please continue to check MVO2 and lactate     # Permanent atrial fibrillation:   [id number]     Rates in the 80-90    Not on AC as outpatient given history of GIB   He is on IV metoprolol 2.5 mg q6h    Case  discussed with Dr. Price. Final recommendations are pending attending co-signature. Please page Cardiology at [phone] with any questions.     Sabiha Cordon MD  8/9/2022

## 2022-08-09 NOTE — PROGRESS NOTES
"Infectious Disease Progress Note    Patient Name: Samy Elena  MR#: [id number]  : 10/6/1930  Admission Date: 2022  Date: 22   Time: 8:52 AM   Author: Toni Ramirez MD        Antibiotics:    Anti-infectives (From admission, onward)    Start     Dose/Rate Route Frequency Ordered Stop    22 1200  ceFEPIme (MAXIPIME) 2 g in 100 mL NSS vial in bag         2 g  200 mL/hr over 30 Minutes intravenous Every 12 hours interval 22 1110            Subjective   No major changes in his condition, remains intubated and sedated.    Objective     Vital Signs:    Visit Vitals  BP (!) 151/82 (BP Location: Right upper arm, Patient Position: Lying)   Pulse 90   Temp 37.1 °C (98.8 °F) (Bladder)   Resp (!) 22   Ht 1.854 m (6' 1\")   Wt 80.3 kg (177 lb)   SpO2 100%   BMI 23.35 kg/m²       No data recorded.      Physical Exam:    General: Elderly man, intubated, sedated, pale and frail looking.  HEENT: NC/AT/ anicteric sclera, pharynx clear, ET tube in place, NG tube in place draining scant bilious material  Neck: supple, no meningismus, right IJ TLC, left IJ [place]-Niki catheter  Cardiovascular: regular S1/S2, distant sounds, soft systolic murmur.   Respiratory: clear to auscultation anteriorly  GI/Abdomen: Obese,  bowel sounds decreased, colostomy bag in place with gas and a scant serosanguineous material..  Abdomen does not appear tender.  Extremities: Edematous extremities   JOINTS:  No swelling, erythema, or pain  Lymphatics: no lymphadenopathy  Neurology: Intubated and sedated   psych: Intubated and sedated   skin: There is a large surgical wound in the perineum extending into left ischial area which is clean and no evidence of necrosis noted in pictures.  Previously described blisters in penile shaft have denuded and expose healthy looking  subcutaneous tissue.  No crepitus appreciated, no necrosis.  G.U.: Penis and scrotum edema, no crepitus.  Will in place.  Significant area of denudation of skin " of penis  Lines: Right IJ TLC C/D/I.  Left IJ [place]-Niki catheter    Lines, Drains, Airways, Wounds:  Introducer 08/04/22 Left Internal jugular (Active)   Number of days: 5       CVC Triple Lumen 08/02/22 Internal jugular (Active)   Number of days: 7       Peripheral IV (Adult) 08/02/22 Right Forearm (Active)   Number of days: 7       Peripheral IV (Adult) 08/02/22 Anterior;Left;Proximal Forearm (Active)   Number of days: 7       NG/OG Tube 08/02/22 Center mouth (Active)   Number of days: 7       Colostomy Other (comment) LUQ (Active)   Number of days: 6       Urethral Catheter Temperature probe 16 Fr (Active)   Number of days: 7       ETT  (Active)   Number of days: 7       Arterial Line 08/05/22 Right Radial (Active)   Number of days: 4       Surgical Incision Buttock (Active)   Number of days: 7       Surgical Incision Abdomen (Active)   Number of days: 6       Surgical Incision Perineum (Active)   Number of days: 6       Catheterization Site - Venous Left Internal Jugular 7 Fr. (Active)   Number of days: 5       Labs:    CBC Results       08/09/22 08/08/22 08/08/22     0414 1138 0518    WBC 16.57 16.06 13.70    RBC 3.59 3.60 3.51    HGB 12.3 12.2 12.3    HCT 37.8 38.4 37.1    .3 106.7 105.7    MCH 34.3 33.9 35.0    MCHC 32.5 31.8 33.2    PLT 84 101 95         Comment for PLT at 1138 on 08/08/22: RESULTS CHECKED. RESULTS OBTAINED AFTER VORTEXING TO ELIMINATE PLT CLUMPS        CMP Results       08/09/22 08/08/22 08/08/22     0414 1708 0518     149 149    K 4.2 4.2 4.2    Cl 121 118 120    CO2 24 24 24    Glucose 288 314 266    BUN 59 60 58    Creatinine 1.0 1.1 1.1    Calcium 7.8 7.8 7.7    Anion Gap 4 7 5    AST 31 32 33    ALT 17 16 16    Albumin 1.8 1.9 1.8    EGFR >60.0 >60.0 >60.0            Micro:  Microbiology Results     Procedure Component Value Units Date/Time    Sputum Gram Stain (Lab Only) Expectorated Sputum [[id number]] Collected: 08/08/22 0311    Specimen: Expectorated Sputum Updated:  08/08/22 1038     Gram Stain Result 2+ WBC      No Epithelial Cells Seen      2+ Yeast    Sputum Culture (Lab Only) Expectorated Sputum [[id number]]  (Abnormal) Collected: 08/08/22 0311    Specimen: Expectorated Sputum Updated: 08/09/22 0651     Culture **Positive Culture**      4+ Yeast, No Further Identification      No Normal Jennifer    Blood Culture Blood, Venous [[id number]]  (Normal) Collected: 08/08/22 0235    Specimen: Blood, Venous Updated: 08/09/22 0301     Culture No growth at 18-24 hours    Blood Culture Blood, Venous [[id number]]  (Normal) Collected: 08/08/22 0235    Specimen: Blood, Venous Updated: 08/09/22 0301     Culture No growth at 18-24 hours    MRSA Screen, Nares Only Nose [[id number]]  (Normal) Collected: 08/02/22 2248    Specimen: Nasal Swab from Nose Updated: 08/03/22 0030     MRSA DNA, Nares Negative    SARS-CoV-2 (COVID-19), PCR Nasopharynx [[id number]]  (Normal) Collected: 08/02/22 1633    Specimen: Nasopharyngeal Swab from Nasopharynx Updated: 08/02/22 1807    Narrative:      The following orders were created for panel order SARS-CoV-2 (COVID-19), PCR Nasopharynx.  Procedure                               Abnormality         Status                     ---------                               -----------         ------                     SARS-CoV-2 (COVID-19), P...[[id number]]  Normal              Final result                 Please view results for these tests on the individual orders.    SARS-CoV-2 (COVID-19), PCR Nasopharynx [[id number]]  (Normal) Collected: 08/02/22 1633    Specimen: Nasopharyngeal Swab from Nasopharynx Updated: 08/02/22 1807     SARS-CoV-2 (COVID-19) Negative    Narrative:      Testing performed using real-time PCR for detection of COVID-19. EUA approved validation studies performed on site.     Blood Culture Blood, Venous [[id number]]  (Normal) Collected: 08/02/22 1628    Specimen: Blood, Venous Updated: 08/07/22 1901     Culture No growth at 120 hours    Blood Culture Blood,  Venous [[id number]]  (Normal) Collected: 08/02/22 1628    Specimen: Blood, Venous Updated: 08/07/22 1901     Culture No growth at 120 hours    Anaerobic Culture / Smear (includes Aerobic Culture) [[id number]]  (Abnormal)  (Susceptibility) Collected: 08/02/22 1536    Specimen: Abscess Fluid from Buttock, Left Updated: 08/07/22 0824     Culture **Positive Culture**      2+ Escherichia coli      2+ Streptococcus anginosus      2+ Enterobacter cloacae Complex      Mixed Anaerobic Growth     Gram Stain Result 3+ WBC      2+ Gram positive cocci      2+ Gram positive bacilli    SARS-CoV-2 (COVID-19), PCR Nasopharynx [[id number]]  (Normal) Collected: 07/25/22 1341    Specimen: Nasopharyngeal Swab from Nasopharynx Updated: 07/25/22 1525    Narrative:      The following orders were created for panel order SARS-CoV-2 (COVID-19), PCR Nasopharynx.  Procedure                               Abnormality         Status                     ---------                               -----------         ------                     SARS-CoV-2 (COVID-19), P...[[id number]]  Normal              Final result                 Please view results for these tests on the individual orders.    SARS-CoV-2 (COVID-19), PCR Nasopharynx [[id number]]  (Normal) Collected: 07/25/22 1341    Specimen: Nasopharyngeal Swab from Nasopharynx Updated: 07/25/22 1525     SARS-CoV-2 (COVID-19) Negative    SARS-CoV-2 (COVID-19), PCR Nasopharynx [[id number]]  (Normal) Collected: 07/22/22 2001    Specimen: Nasopharyngeal Swab from Nasopharynx Updated: 07/22/22 2125    Narrative:      The following orders were created for panel order SARS-CoV-2 (COVID-19), PCR Nasopharynx.  Procedure                               Abnormality         Status                     ---------                               -----------         ------                     SARS-CoV-2 (COVID-19), P...[[id number]]  Normal              Final result                 Please view results for these tests on the  individual orders.    SARS-CoV-2 (COVID-19), PCR Nasopharynx [[id number]]  (Normal) Collected: 07/22/22 2001    Specimen: Nasopharyngeal Swab from Nasopharynx Updated: 07/22/22 2125     SARS-CoV-2 (COVID-19) Negative    Narrative:      Testing performed using real-time PCR for detection of COVID-19. EUA approved validation studies performed on site.               Imaging:    Radiology Imaging    XR CHEST 1 VW    Narrative  CLINICAL HISTORY: Fever.    COMMENT: Semiupright portable view of the chest was obtained at 0043 hours.  Comparison with a similar study from 8/7/2022.    A right internal jugular approach central venous catheter is seen with its tip  in the right atrium. Endotracheal tube tip noted below the clavicles.  Endogastric tube courses below left hemidiaphragm and out of the field-of-view.  There are operative changes of previous CABG. The heart is stable in size. There  are atherosclerotic calcifications of the thoracic aorta. Hazy opacity at the  right lung base likely represents a combination of small pleural effusion and  associated partial atelectasis. Skeletal structures are stable.    --    Impression  Stable chest.      Assessment        89 years old male with history of CHF, A. fib not on anticoagulation due to prior history of GI bleed, tricuspid regurgitation, pulmonary hypertension, coronary disease, who was recently discharged from hospital after he presented with slurred speech and hypotension.  Work-up for CVA was negative and it was concluded that he was suffering from orthostatic hypotension.  Medications were adjusted and he was discharged to rehabilitation facility where he developed buttocks and perineum pain.  He was noted to have necrotizing changes during surgical consultation and admitted to hospital for surgical debridement.  Intraoperatively, significant necrotizing process noted.  Multiple cultures were obtained showing polymicrobial process.  Taken for second look without  evidence of additional necrotizing changes.       1.  Justin's gangrene status post surgical debridement   2.  CHF, pulmonary edema  3.  Medical therapy with Jardiance- unclear if for coronary artery disease/CHF management or hyperglycemia.  4.  Septic and cardiogenic shock  5.  Estimated Creatinine Clearance: 55.5 mL/min (by C-G formula based on SCr of 1 mg/dL)..                Plan   1.  Continue therapy with cefepime.  2.  Today is day 7 of antibiotic treatment.  3.  Continue management of ventilator requirements and hemodynamics per ICU and cardiology.  4.  Plan is to complete 14 days of therapy.  5.  We will follow with you.    More than 35 minutes were spent obtaining a detailed interval history, detailed exam, evaluating this high complexity case with significant medical decision making including continuation of antibiotic therapy, coordination of care with primary team and consultants.      KEN BARNETT MD  INFECTIOUS DISEASES    NOTE: Some or all of the note above was created with the use of dictation software, please note this dictation software can have anomalies in its ability to transcribe. Please contact me directly for anything that seems abnormal for clarification.

## 2022-08-09 NOTE — PROGRESS NOTES
SURGICAL CRITICAL CARE DAILY PROGRESS NOTE     PATIENT NAME:  Samy Elena YOB: 1930    AGE:  89 y.o.  GENDER: male   MRN:  [id number]  PATIENT #: [id number]     SUBJECTIVE   Remains intubated in ICU. Alert and nods appropriately to questions. Tolerating tube feeds at goal.    Yesterday, was given 2x 500cc fluid boluses for increasing pressor requirements.     Currently requiring Levo of 7. Doubutamine off. Vaso off. Mixed venous O2 60. Did not tolerate SBT yesterday. PRVC 40%/450/20/6 overnight    Has end-colostomy. Pink, perfused, protruding. Has gas, now productive of stool.     ID following. On Cefepime 2g Q12.      Sliding scale insulin increased yesterday. BG this AM were 200-300. Will further increase SSI and consider Endo consult.     VITAL SIGNS     Temperature:   No data recorded.       Last 24 hours:    Heart Rate:  [] 90  Resp:  [22-30] 22  BP: ()/(51-82) 151/82  FiO2 (%) (Set):  [40 %] 40 %    VENT SETTINGS:   Vent Mode: Pressure regulated volume control  FiO2 (%) (Set):  [40 %] 40 %  S RR:  [20] 20  S VT:  [450 mL-480 mL] 450 mL  PEEP/CPAP (Set, cmH2O):  [6 cm H20] 6 cm H20  MAP (Observed, cmH2O):  [6-11] 7    INTAKE & OUTPUT     I/O last 3 completed shifts:  In: 4639.3 [I.V.:1131.4; NG/GT:2707.8; IV Piggyback:800]  Out: 2585 [Urine:2085; Stool:500]    Intake/Output Summary (Last 24 hours) at 8/9/2022 0715  Last data filed at 8/9/2022 0700  Gross per 24 hour   Intake 3281.26 ml   Output 1945 ml   Net 1336.26 ml     I/O this shift:  In: 103.4 [I.V.:38.4; NG/GT:65]  Out: -      MEDICATIONS     Infusions:    • dexmedetomidine in 0.9 % NaCl  0.2-1.5 mcg/kg/hr 0.6 mcg/kg/hr (08/09/22 0700)   • DOBUTamine  2.5 mcg/kg/min Stopped (08/07/22 0322)   • fentaNYL  0-200 mcg/hr 75 mcg/hr (08/09/22 0700)   • norepinephrine  0.5-90 mcg/min 6 mcg/min (08/09/22 0705)            Scheduled:   • cefepime  2 g intravenous q12h INT   • chlorhexidine  15 mL Mouth/Throat 2 times per day   •  collagenase  1 application Topical Daily   • heparin (porcine)  5,000 Units subcutaneous q8h IVÁN   • insulin aspart U-100  8-11 Units subcutaneous q6h INT   • metoprolol  2.5 mg intravenous q6h INT   • pantoprazole  40 mg intravenous q24h   • sodium hypochlorite   Topical Daily       Antibiotics:  Anti-infectives (From admission, onward)    Start     Dose/Rate Route Frequency Ordered Stop    08/05/22 1200  ceFEPIme (MAXIPIME) 2 g in 100 mL NSS vial in bag         2 g  200 mL/hr over 30 Minutes intravenous Every 12 hours interval 08/05/22 1110            PRN:     acetaminophen, 650 mg, q4h PRN  glucose, 16-32 g of dextrose, PRN   Or  dextrose, 15-30 g of dextrose, PRN   Or  glucagon, 1 mg, PRN   Or  dextrose 50 % in water (D50), 25 mL, PRN  glucose, 16-32 g of dextrose, PRN   Or  dextrose, 15-30 g of dextrose, PRN   Or  glucagon, 1 mg, PRN   Or  dextrose 50 % in water (D50), 25 mL, PRN  fentaNYL, 25 mcg, q5 min PRN  HYDROmorphone, 0.5 mg, q3h PRN         DIAGNOSTIC DATA     LABS:  Recent Results (from the past 24 hour(s))   Blood Gas, arterial Comprehensive    Collection Time: 08/08/22 10:19 AM   Result Value Ref Range    pH, Arterial 7.33 (L) 7.35 - 7.45    pCO2, Arterial 34 (L) 35 - 48 mm Hg    pO2, Arterial 184 (H) 83 - 100 mm Hg    HCO3, Arterial 19.4 (L) 21.0 - 28.0 mEQ/L    Base Excess, Arterial -7.1 mEQ/L    O2 Sat, Arterial 97 93 - 98 %    TCO2, Arterial 18.9 (L) 22.0 - 32.0 mEQ/L    Lactate, Art 1.0 0.4 - 1.6 mmol/L    Glucose, Arterial 248 (H) 70 - 99 mg/dL    Sodium, Arterial 148 (H) 136 - 145 mEQ/L    Potassium, Arterial 3.0 (L) 3.4 - 4.5 mEQ/L    Chloride, Arterial 124 (H) 98 - 109 mEQ/L    Ionized Calcium, Arterial 0.91 (L) 1.15 - 1.27 mmol/L    Hemoglobin, Arterial 10.6 (L) 14.0 - 17.5 g/dL    Carboxyhemoglobin 2.0 0.0 - 3.0; (Smokers: 0.0 - 9.0) %    Methemoglobin 1.0 0.4 - 1.5 %    Source Of Oxygen [facility] 40/6/20/450     FIO2 40%    POCT Glucose    Collection Time: 08/08/22 11:36 AM   Result Value Ref  Range    POCT Bedside Glucose 285 (H) 70 - 99 mg/dL    POC Test POC    CBC    Collection Time: 08/08/22 11:38 AM   Result Value Ref Range    WBC 16.06 (H) 3.80 - 10.50 K/uL    RBC 3.60 (L) 4.50 - 5.80 M/uL    Hemoglobin 12.2 (L) 13.7 - 17.5 g/dL    Hematocrit 38.4 (L) 40.1 - 51.0 %    .7 (H) 83.0 - 98.0 fL    MCH 33.9 (H) 28.0 - 33.2 pg    MCHC 31.8 (L) 32.2 - 36.5 g/dL    RDW 15.2 (H) 11.6 - 14.4 %    Platelets 101 (L) 150 - 350 K/uL    MPV 12.0 9.4 - 12.4 fL   Oxygen saturation, venous    Collection Time: 08/08/22 11:41 AM   Result Value Ref Range    Oxygen Saturation, Venous 62 (H) 30 - 60 %   Lactic acid, Venous    Collection Time: 08/08/22 11:41 AM   Result Value Ref Range    Lactate 1.3 0.4 - 2.0 mmol/L   Lactic acid, Venous    Collection Time: 08/08/22  5:08 PM   Result Value Ref Range    Lactate 1.0 0.4 - 2.0 mmol/L   Comprehensive metabolic panel    Collection Time: 08/08/22  5:08 PM   Result Value Ref Range    Sodium 149 (H) 136 - 144 mEQ/L    Potassium 4.2 3.6 - 5.1 mEQ/L    Chloride 118 (H) 98 - 109 mEQ/L    CO2 24 22 - 32 mEQ/L    BUN 60 (H) 8 - 20 mg/dL    Creatinine 1.1 0.8 - 1.3 mg/dL    Glucose 314 (H) 70 - 99 mg/dL    Calcium 7.8 (L) 8.9 - 10.3 mg/dL    AST (SGOT) 32 15 - 41 IU/L    ALT (SGPT) 16 16 - 63 IU/L    Alkaline Phosphatase 65 35 - 126 IU/L    Total Protein 5.3 (L) 6.0 - 8.2 g/dL    Albumin 1.9 (L) 3.4 - 5.0 g/dL    Bilirubin, Total 1.4 (H) 0.3 - 1.2 mg/dL    eGFR >60.0 >=60.0 mL/min/1.73m*2    Anion Gap 7 3 - 15 mEQ/L   Oxygen saturation, venous    Collection Time: 08/08/22  5:08 PM   Result Value Ref Range    Oxygen Saturation, Venous 66 (H) 30 - 60 %   POCT Glucose    Collection Time: 08/08/22  5:59 PM   Result Value Ref Range    POCT Bedside Glucose 262 (H) 70 - 99 mg/dL    POC Test POC    POCT Glucose    Collection Time: 08/08/22 11:32 PM   Result Value Ref Range    POCT Bedside Glucose 242 (H) 70 - 99 mg/dL    POC Test POC    CBC    Collection Time: 08/09/22  4:14 AM   Result  Value Ref Range    WBC 16.57 (H) 3.80 - 10.50 K/uL    RBC 3.59 (L) 4.50 - 5.80 M/uL    Hemoglobin 12.3 (L) 13.7 - 17.5 g/dL    Hematocrit 37.8 (L) 40.1 - 51.0 %    .3 (H) 83.0 - 98.0 fL    MCH 34.3 (H) 28.0 - 33.2 pg    MCHC 32.5 32.2 - 36.5 g/dL    RDW 15.1 (H) 11.6 - 14.4 %    Platelets 84 (L) 150 - 350 K/uL    MPV 11.9 9.4 - 12.4 fL   Magnesium    Collection Time: 08/09/22  4:14 AM   Result Value Ref Range    Magnesium 2.3 1.8 - 2.5 mg/dL   Phosphorus    Collection Time: 08/09/22  4:14 AM   Result Value Ref Range    Phosphorus 1.8 (L) 2.4 - 4.7 mg/dL   Protime-INR    Collection Time: 08/09/22  4:14 AM   Result Value Ref Range    PT 17.6 (H) 12.2 - 14.5 sec    INR 1.5     Lactic acid, Venous    Collection Time: 08/09/22  4:14 AM   Result Value Ref Range    Lactate 0.9 0.4 - 2.0 mmol/L   Calcium, ionized    Collection Time: 08/09/22  4:14 AM   Result Value Ref Range    Ionized Calcium, Venous 1.15 1.15 - 1.27 mmol/L   Oxygen saturation, venous    Collection Time: 08/09/22  4:14 AM   Result Value Ref Range    Oxygen Saturation, Venous 97 (H) 30 - 60 %   Comprehensive metabolic panel    Collection Time: 08/09/22  4:14 AM   Result Value Ref Range    Sodium 149 (H) 136 - 144 mEQ/L    Potassium 4.2 3.6 - 5.1 mEQ/L    Chloride 121 (H) 98 - 109 mEQ/L    CO2 24 22 - 32 mEQ/L    BUN 59 (H) 8 - 20 mg/dL    Creatinine 1.0 0.8 - 1.3 mg/dL    Glucose 288 (H) 70 - 99 mg/dL    Calcium 7.8 (L) 8.9 - 10.3 mg/dL    AST (SGOT) 31 15 - 41 IU/L    ALT (SGPT) 17 16 - 63 IU/L    Alkaline Phosphatase 63 35 - 126 IU/L    Total Protein 5.5 (L) 6.0 - 8.2 g/dL    Albumin 1.8 (L) 3.4 - 5.0 g/dL    Bilirubin, Total 1.3 (H) 0.3 - 1.2 mg/dL    eGFR >60.0 >=60.0 mL/min/1.73m*2    Anion Gap 4 3 - 15 mEQ/L     Serum creatinine: 1 mg/dL 08/09/22 0414  Estimated creatinine clearance: 55.5 mL/min  Microbiology Results     Procedure Component Value Units Date/Time    MRSA Screen, Nares Only Nose [[id number]]  (Normal) Collected: 08/02/22 3521     Specimen: Nasal Swab from Nose Updated: 08/03/22 0030     MRSA DNA, Nares Negative    SARS-CoV-2 (COVID-19), PCR Nasopharynx [[id number]]  (Normal) Collected: 08/02/22 1633    Specimen: Nasopharyngeal Swab from Nasopharynx Updated: 08/02/22 1807    Narrative:      The following orders were created for panel order SARS-CoV-2 (COVID-19), PCR Nasopharynx.  Procedure                               Abnormality         Status                     ---------                               -----------         ------                     SARS-CoV-2 (COVID-19), P...[[id number]]  Normal              Final result                 Please view results for these tests on the individual orders.    SARS-CoV-2 (COVID-19), PCR Nasopharynx [[id number]]  (Normal) Collected: 08/02/22 1633    Specimen: Nasopharyngeal Swab from Nasopharynx Updated: 08/02/22 1807     SARS-CoV-2 (COVID-19) Negative    Narrative:      Testing performed using real-time PCR for detection of COVID-19. EUA approved validation studies performed on site.     Anaerobic Culture / Smear (includes Aerobic Culture) [[id number]] Collected: 08/02/22 1536    Specimen: Abscess Fluid from Buttock, Left Updated: 08/02/22 2247     Gram Stain Result 3+ WBC      2+ Gram positive cocci      2+ Gram positive bacilli    SARS-CoV-2 (COVID-19), PCR Nasopharynx [[id number]]  (Normal) Collected: 07/25/22 1341    Specimen: Nasopharyngeal Swab from Nasopharynx Updated: 07/25/22 1525    Narrative:      The following orders were created for panel order SARS-CoV-2 (COVID-19), PCR Nasopharynx.  Procedure                               Abnormality         Status                     ---------                               -----------         ------                     SARS-CoV-2 (COVID-19), P...[[id number]]  Normal              Final result                 Please view results for these tests on the individual orders.    SARS-CoV-2 (COVID-19), PCR Nasopharynx [[id number]]  (Normal) Collected: 07/25/22 1341     Specimen: Nasopharyngeal Swab from Nasopharynx Updated: 07/25/22 1525     SARS-CoV-2 (COVID-19) Negative    SARS-CoV-2 (COVID-19), PCR Nasopharynx [[id number]]  (Normal) Collected: 07/22/22 2001    Specimen: Nasopharyngeal Swab from Nasopharynx Updated: 07/22/22 2125    Narrative:      The following orders were created for panel order SARS-CoV-2 (COVID-19), PCR Nasopharynx.  Procedure                               Abnormality         Status                     ---------                               -----------         ------                     SARS-CoV-2 (COVID-19), P...[[id number]]  Normal              Final result                 Please view results for these tests on the individual orders.    SARS-CoV-2 (COVID-19), PCR Nasopharynx [[id number]]  (Normal) Collected: 07/22/22 2001    Specimen: Nasopharyngeal Swab from Nasopharynx Updated: 07/22/22 2125     SARS-CoV-2 (COVID-19) Negative    Narrative:      Testing performed using real-time PCR for detection of COVID-19. EUA approved validation studies performed on site.           IMAGING:  No results found.    Radiology Imaging    XR CHEST 1 VW    Narrative  CLINICAL HISTORY: Ataxia.    --    Impression  Findings suspicious for congestive heart failure.    COMMENT: AP radiograph of the chest was obtained.  We have no prior similar  studies for comparison.  There is cardiomegaly.  There is vascular  congestion/interstitial edema.  There are bilateral effusions.  Findings suggest  congestive heart failure.  Sternal sutures are seen.  There is no pneumothorax.  We have no prior similar studies for comparison.       Lines, Drains, Airways, Wounds:     CVC Triple Lumen 08/02/22 Internal jugular (Active)   Number of days: 1       Peripheral IV (Adult) 07/22/22 Left Forearm (Active)   Number of days: 12       Peripheral IV (Adult) 08/02/22 Right Forearm (Active)   Number of days: 1       Peripheral IV (Adult) 08/02/22 Anterior;Left;Proximal Forearm (Active)   Number of  days: 1       NG/OG Tube 08/02/22 Center mouth (Active)   Number of days: 1       Urethral Catheter Temperature probe 16 Fr (Active)   Number of days: 1       ETT  (Active)   Number of days: 1       Arterial Line 08/02/22 Left Radial (Active)   Number of days: 1       Surgical Incision Buttock (Active)   Number of days: 1       PHYSICAL EXAM     General appearance: Intubated and minimally sedated.   Head: normocephalic, without obvious abnormality, atraumatic. ETT in place.   Eyes: negative, no scleral icterus.   Nose: no discharge  Neck: no JVD, symmetrical, trachea midline.    RIJ CVC in place. Left neck with swan odalis catheter in place  Lungs: Mechanical ventilation.   Chest wall: No deformities or palpable masses  Heart: Regular rate and rhythm.   Abdomen: soft, moderately distended, non-tender.   Ostomy pink, protruding, perfused, productive of gas and bowel sweat. No stool.  Genitalia: edematous, erythematous. No streaking or crepitous.    Incisions clean, dry, intact. Erythema stable.   Extremities: extremities warm and well-perfused.   Right A-Line in place. Good waveform.   Pulses: 2+ and symmetric.  Skin: Skin color, texture, turgor normal.  Neurologic: Unable to assess secondary to sedation.       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 male HD#7 s/p EUA, wound debridement for necrotizing soft tissue infection of the perirectal and perineal tissue on 8/2. 5 Days Post-Op    NEURO: Intubated & sedated. On Precedex 0.6 & Fentanyl 50. Follows commands.     CV: aFib. HR 70-100s. Levo of 7. MAPS >65. Metop 2.5mg Q6. PA pressure 36/16. EF 40-45% (8/3).    PULM: ETT. 20/450/40%/6. Will keep intubated while on  pressors. SBT today.     GI:  NPO. PPI. LUQ end-colostomy. Productive gas & stool. Tube feeds  at goal.     : Fc. Cr: 1.0 (1.1). 0.67c/kg/hr UOP. Genitalia erythematous. Daily dressing changes.    HEME: HgB of 12.3. plts 84 this am.    ID: WBC: 16.57 (from 16.06), Pancultures.  NSTI. Cefipime. OR Cx: 2+ E. Coli, 2+ streptoccus anginous, 2+ enterobacter cloace. 2+ GPCs, 2+ GPBs.     ENDO: SSI. Q6 POCT.     DVT PPX: SCDs & sq heparin TID    GI PPX:  PPI    DISPOSITION:  SICU.     Please page [phone] or [phone] with any questions or concerns.  Connor Evans MD  8/9/2022  7:15 AM

## 2022-08-09 NOTE — PLAN OF CARE
Problem: Adult Inpatient Plan of Care  Goal: Plan of Care Review  Outcome: Progressing  Flowsheets (Taken 8/8/2022 2252)  Progress: no change  Plan of Care Reviewed With: patient  Outcome Summary: maintaining vss, TF at goal, ostomy with liquid stool, dressing change done.     Problem: Adult Inpatient Plan of Care  Goal: Patient-Specific Goal (Individualized)  Outcome: Progressing  Flowsheets (Taken 8/8/2022 2252)  Patient-Specific Goals (Include Timeframe): extubate when stable  Individualized Care Needs: maintaining VSS  Anxieties, Fears or Concerns: updated daughter   Plan of Care Review  Plan of Care Reviewed With: patient  Progress: no change  Outcome Summary: maintaining vss, TF at goal, ostomy with liquid stool, dressing change done.

## 2022-08-09 NOTE — PLAN OF CARE
Problem: Adult Inpatient Plan of Care  Goal: Plan of Care Review  Outcome: Progressing  Flowsheets (Taken 8/9/2022 1602)  Progress: no change  Plan of Care Reviewed With:  • patient  • son     Problem: Impaired Wound Healing  Goal: Optimal Wound Healing  Outcome: Progressing  Intervention: Promote Wound Healing  Flowsheets (Taken 8/9/2022 1602)  Activity Management: bedrest  Pain Management Interventions:  • medication administered  • care clustered  Sleep/Rest Enhancement: regular sleep/rest pattern promoted     Problem: Postoperative Stoma Care (Colostomy)  Goal: Optimal Stoma Healing  Outcome: Progressing  Intervention: Provide Ostomy and Peristomal Skin Care  Flowsheets (Taken 8/9/2022 1602)  Skin Protection: adhesive use limited    Pt presenting with Fourniers gangrene necrotizing soft tissue infection s/p I&D, washout, and colostomy. Fent & precedex gtt titrated for adequate sedation. Levo gtt titrated to maintain MAP >65 per order. Insulin gtt started for uncontrolled BG- BG have since been trending down per q1h accucheks, insulin gtt titrated per protocol. Wound care provided per orders. VSS, q2h turns and repositioning provided, safety precautions maintained. Anticipate GOC discussion Thursday vs Friday with pt 4 sons.     Plan of Care Review  Plan of Care Reviewed With: patient, son  Progress: no change

## 2022-08-10 ENCOUNTER — APPOINTMENT (INPATIENT)
Dept: RADIOLOGY | Facility: HOSPITAL | Age: 87
DRG: 717 | End: 2022-08-10
Attending: STUDENT IN AN ORGANIZED HEALTH CARE EDUCATION/TRAINING PROGRAM
Payer: MEDICARE

## 2022-08-10 ENCOUNTER — ANESTHESIA (INPATIENT)
Dept: INTENSIVE CARE | Facility: HOSPITAL | Age: 87
DRG: 717 | End: 2022-08-10
Payer: MEDICARE

## 2022-08-10 ENCOUNTER — ANESTHESIA EVENT (INPATIENT)
Dept: INTENSIVE CARE | Facility: HOSPITAL | Age: 87
DRG: 717 | End: 2022-08-10
Payer: MEDICARE

## 2022-08-10 PROBLEM — R53.81 DEBILITY: Status: ACTIVE | Noted: 2022-08-10

## 2022-08-10 PROBLEM — Z51.5 PALLIATIVE CARE BY SPECIALIST: Status: ACTIVE | Noted: 2022-08-10

## 2022-08-10 LAB
ALBUMIN SERPL-MCNC: 1.8 G/DL (ref 3.4–5)
ALP SERPL-CCNC: 76 IU/L (ref 35–126)
ALT SERPL-CCNC: 17 IU/L (ref 16–63)
ANION GAP SERPL CALC-SCNC: 5 MEQ/L (ref 3–15)
ANION GAP SERPL CALC-SCNC: 6 MEQ/L (ref 3–15)
ANION GAP SERPL CALC-SCNC: 7 MEQ/L (ref 3–15)
AST SERPL-CCNC: 41 IU/L (ref 15–41)
BASE EXCESS BLDA CALC-SCNC: 0.5 MEQ/L
BILIRUB SERPL-MCNC: 1.3 MG/DL (ref 0.3–1.2)
BUN SERPL-MCNC: 50 MG/DL (ref 8–20)
BUN SERPL-MCNC: 53 MG/DL (ref 8–20)
BUN SERPL-MCNC: 54 MG/DL (ref 8–20)
CA-I BLD-SCNC: 1.22 MMOL/L (ref 1.15–1.27)
CA-I BLD-SCNC: 1.24 MMOL/L (ref 1.15–1.27)
CALCIUM SERPL-MCNC: 8 MG/DL (ref 8.9–10.3)
CALCIUM SERPL-MCNC: 8.3 MG/DL (ref 8.9–10.3)
CALCIUM SERPL-MCNC: 8.3 MG/DL (ref 8.9–10.3)
CHLORIDE BLDA-SCNC: 124 MEQ/L (ref 98–109)
CHLORIDE SERPL-SCNC: 121 MEQ/L (ref 98–109)
CHLORIDE SERPL-SCNC: 123 MEQ/L (ref 98–109)
CHLORIDE SERPL-SCNC: 125 MEQ/L (ref 98–109)
CO2 BLDA-SCNC: 27.4 MEQ/L (ref 22–32)
CO2 SERPL-SCNC: 21 MEQ/L (ref 22–32)
CO2 SERPL-SCNC: 26 MEQ/L (ref 22–32)
CO2 SERPL-SCNC: 27 MEQ/L (ref 22–32)
COHGB MFR BLD: 2.2 %
CREAT SERPL-MCNC: 0.9 MG/DL (ref 0.8–1.3)
ERYTHROCYTE [DISTWIDTH] IN BLOOD BY AUTOMATED COUNT: 15.5 % (ref 11.6–14.4)
FIO2 ON VENT: 40 %
GFR SERPL CREATININE-BSD FRML MDRD: >60 ML/MIN/1.73M*2
GLUCOSE BLD-MCNC: 101 MG/DL (ref 70–99)
GLUCOSE BLD-MCNC: 107 MG/DL (ref 70–99)
GLUCOSE BLD-MCNC: 109 MG/DL (ref 70–99)
GLUCOSE BLD-MCNC: 114 MG/DL (ref 70–99)
GLUCOSE BLD-MCNC: 120 MG/DL (ref 70–99)
GLUCOSE BLD-MCNC: 143 MG/DL (ref 70–99)
GLUCOSE BLD-MCNC: 143 MG/DL (ref 70–99)
GLUCOSE BLD-MCNC: 148 MG/DL (ref 70–99)
GLUCOSE BLD-MCNC: 162 MG/DL (ref 70–99)
GLUCOSE BLD-MCNC: 163 MG/DL (ref 70–99)
GLUCOSE BLD-MCNC: 169 MG/DL (ref 70–99)
GLUCOSE BLD-MCNC: 173 MG/DL (ref 70–99)
GLUCOSE BLD-MCNC: 174 MG/DL (ref 70–99)
GLUCOSE BLD-MCNC: 178 MG/DL (ref 70–99)
GLUCOSE BLD-MCNC: 182 MG/DL (ref 70–99)
GLUCOSE BLD-MCNC: 186 MG/DL (ref 70–99)
GLUCOSE BLD-MCNC: 192 MG/DL (ref 70–99)
GLUCOSE BLD-MCNC: 204 MG/DL (ref 70–99)
GLUCOSE BLD-MCNC: 234 MG/DL (ref 70–99)
GLUCOSE BLD-MCNC: 86 MG/DL (ref 70–99)
GLUCOSE BLD-MCNC: 93 MG/DL (ref 70–99)
GLUCOSE BLDA-MCNC: 138 MG/DL (ref 70–99)
GLUCOSE SERPL-MCNC: 137 MG/DL (ref 70–99)
GLUCOSE SERPL-MCNC: 221 MG/DL (ref 70–99)
GLUCOSE SERPL-MCNC: 227 MG/DL (ref 70–99)
HCO3 BLDA-SCNC: 25.3 MEQ/L (ref 21–28)
HCT VFR BLDCO AUTO: 39.3 % (ref 40.1–51)
HGB BLD-MCNC: 12.6 G/DL (ref 13.7–17.5)
HGB BLDA OXIMETRY-MCNC: 12 G/DL (ref 14–17.5)
INHALED O2 CONCENTRATION: ABNORMAL %
INR PPP: 1.5
LACTATE BLDA-SCNC: 0.8 MMOL/L (ref 0.4–1.6)
LACTATE SERPL-SCNC: 0.7 MMOL/L (ref 0.4–2)
MAGNESIUM SERPL-MCNC: 2.3 MG/DL (ref 1.8–2.5)
MCH RBC QN AUTO: 35.3 PG (ref 28–33.2)
MCHC RBC AUTO-ENTMCNC: 32.1 G/DL (ref 32.2–36.5)
MCV RBC AUTO: 110.1 FL (ref 83–98)
METHGB BLD-SCNC: 0.4 % (ref 0.4–1.5)
MICROORGANISM SPEC CULT: ABNORMAL
PCO2 BLDA: 44 MM HG (ref 35–48)
PDW BLD AUTO: 12 FL (ref 9.4–12.4)
PH BLDA: 7.38 [PH] (ref 7.35–7.45)
PHOSPHATE SERPL-MCNC: 1.9 MG/DL (ref 2.4–4.7)
PLATELET # BLD AUTO: 89 K/UL (ref 150–350)
PO2 BLDA: 158 MM HG (ref 83–100)
POCT TEST: ABNORMAL
POCT TEST: NORMAL
POCT TEST: NORMAL
POTASSIUM BLDA-SCNC: 4.6 MEQ/L (ref 3.4–4.5)
POTASSIUM SERPL-SCNC: 4.3 MEQ/L (ref 3.6–5.1)
POTASSIUM SERPL-SCNC: 4.5 MEQ/L (ref 3.6–5.1)
POTASSIUM SERPL-SCNC: 4.7 MEQ/L (ref 3.6–5.1)
PROT SERPL-MCNC: 5.5 G/DL (ref 6–8.2)
PROTHROMBIN TIME: 17.9 SEC (ref 12.2–14.5)
RBC # BLD AUTO: 3.57 M/UL (ref 4.5–5.8)
SAO2 % BLDA: 97 % (ref 93–98)
SAO2 % BLDV: 80 % (ref 30–60)
SODIUM BLDA-SCNC: 154 MEQ/L (ref 136–145)
SODIUM SERPL-SCNC: 153 MEQ/L (ref 136–144)
SODIUM SERPL-SCNC: 154 MEQ/L (ref 136–144)
SODIUM SERPL-SCNC: 154 MEQ/L (ref 136–144)
WBC # BLD AUTO: 19.34 K/UL (ref 3.8–10.5)

## 2022-08-10 PROCEDURE — 83735 ASSAY OF MAGNESIUM: CPT | Performed by: SURGERY

## 2022-08-10 PROCEDURE — 25000000 HC PHARMACY GENERAL: Performed by: SURGERY

## 2022-08-10 PROCEDURE — 31646 BRNCHSC W/THER ASPIR SBSQ: CPT

## 2022-08-10 PROCEDURE — 84100 ASSAY OF PHOSPHORUS: CPT | Performed by: SURGERY

## 2022-08-10 PROCEDURE — 99497 ADVNCD CARE PLAN 30 MIN: CPT | Mod: 25 | Performed by: INTERNAL MEDICINE

## 2022-08-10 PROCEDURE — 82810 BLOOD GASES O2 SAT ONLY: CPT

## 2022-08-10 PROCEDURE — 36415 COLL VENOUS BLD VENIPUNCTURE: CPT

## 2022-08-10 PROCEDURE — 25000000 HC PHARMACY GENERAL: Performed by: STUDENT IN AN ORGANIZED HEALTH CARE EDUCATION/TRAINING PROGRAM

## 2022-08-10 PROCEDURE — 94003 VENT MGMT INPAT SUBQ DAY: CPT

## 2022-08-10 PROCEDURE — 80048 BASIC METABOLIC PNL TOTAL CA: CPT | Performed by: STUDENT IN AN ORGANIZED HEALTH CARE EDUCATION/TRAINING PROGRAM

## 2022-08-10 PROCEDURE — 0B21XEZ CHANGE ENDOTRACHEAL AIRWAY IN TRACHEA, EXTERNAL APPROACH: ICD-10-PCS | Performed by: SURGERY

## 2022-08-10 PROCEDURE — 0BJ08ZZ INSPECTION OF TRACHEOBRONCHIAL TREE, VIA NATURAL OR ARTIFICIAL OPENING ENDOSCOPIC: ICD-10-PCS | Performed by: SURGERY

## 2022-08-10 PROCEDURE — 85027 COMPLETE CBC AUTOMATED: CPT | Performed by: SURGERY

## 2022-08-10 PROCEDURE — 25800000 HC PHARMACY IV SOLUTIONS: Performed by: STUDENT IN AN ORGANIZED HEALTH CARE EDUCATION/TRAINING PROGRAM

## 2022-08-10 PROCEDURE — 80053 COMPREHEN METABOLIC PANEL: CPT | Performed by: STUDENT IN AN ORGANIZED HEALTH CARE EDUCATION/TRAINING PROGRAM

## 2022-08-10 PROCEDURE — 20000000 HC ROOM AND CARE ICU

## 2022-08-10 PROCEDURE — 84132 ASSAY OF SERUM POTASSIUM: CPT | Performed by: STUDENT IN AN ORGANIZED HEALTH CARE EDUCATION/TRAINING PROGRAM

## 2022-08-10 PROCEDURE — 63600000 HC DRUGS/DETAIL CODE: Performed by: SURGERY

## 2022-08-10 PROCEDURE — 74018 RADEX ABDOMEN 1 VIEW: CPT

## 2022-08-10 PROCEDURE — 99223 1ST HOSP IP/OBS HIGH 75: CPT | Mod: 25 | Performed by: INTERNAL MEDICINE

## 2022-08-10 PROCEDURE — 82330 ASSAY OF CALCIUM: CPT | Performed by: SURGERY

## 2022-08-10 PROCEDURE — 71045 X-RAY EXAM CHEST 1 VIEW: CPT

## 2022-08-10 PROCEDURE — 63600000 HC DRUGS/DETAIL CODE: Performed by: STUDENT IN AN ORGANIZED HEALTH CARE EDUCATION/TRAINING PROGRAM

## 2022-08-10 PROCEDURE — 83605 ASSAY OF LACTIC ACID: CPT | Performed by: SURGERY

## 2022-08-10 PROCEDURE — 63700000 HC SELF-ADMINISTRABLE DRUG: Performed by: SURGERY

## 2022-08-10 PROCEDURE — 99233 SBSQ HOSP IP/OBS HIGH 50: CPT | Performed by: INTERNAL MEDICINE

## 2022-08-10 PROCEDURE — 85610 PROTHROMBIN TIME: CPT | Performed by: SURGERY

## 2022-08-10 RX ORDER — MIDAZOLAM HYDROCHLORIDE 2 MG/2ML
2 INJECTION, SOLUTION INTRAMUSCULAR; INTRAVENOUS ONCE
Status: COMPLETED | OUTPATIENT
Start: 2022-08-10 | End: 2022-08-10

## 2022-08-10 RX ADMIN — SODIUM CHLORIDE 0.31 UNITS/HR: 900 INJECTION INTRAVENOUS at 10:28

## 2022-08-10 RX ADMIN — NOREPINEPHRINE BITARTRATE 11 MCG/MIN: 1 INJECTION, SOLUTION, CONCENTRATE INTRAVENOUS at 10:06

## 2022-08-10 RX ADMIN — PANTOPRAZOLE SODIUM 40 MG: 40 INJECTION, POWDER, FOR SOLUTION INTRAVENOUS at 08:59

## 2022-08-10 RX ADMIN — CHLORHEXIDINE GLUCONATE 15 ML: 1.2 SOLUTION ORAL at 21:59

## 2022-08-10 RX ADMIN — DEXMEDETOMIDINE 1 MCG/KG/HR: 200 INJECTION, SOLUTION INTRAVENOUS at 06:05

## 2022-08-10 RX ADMIN — METOPROLOL TARTRATE 2.5 MG: 5 INJECTION, SOLUTION INTRAVENOUS at 00:15

## 2022-08-10 RX ADMIN — DAKIN'S SOLUTION 0.125% (QUARTER STRENGTH): 0.12 SOLUTION at 08:59

## 2022-08-10 RX ADMIN — METOPROLOL TARTRATE 2.5 MG: 5 INJECTION, SOLUTION INTRAVENOUS at 06:35

## 2022-08-10 RX ADMIN — DEXMEDETOMIDINE 1 MCG/KG/HR: 200 INJECTION, SOLUTION INTRAVENOUS at 10:25

## 2022-08-10 RX ADMIN — METOPROLOL TARTRATE 2.5 MG: 5 INJECTION, SOLUTION INTRAVENOUS at 18:24

## 2022-08-10 RX ADMIN — NOREPINEPHRINE BITARTRATE 9 MCG/MIN: 1 INJECTION, SOLUTION, CONCENTRATE INTRAVENOUS at 16:18

## 2022-08-10 RX ADMIN — HEPARIN SODIUM 5000 UNITS: 5000 INJECTION, SOLUTION INTRAVENOUS; SUBCUTANEOUS at 05:01

## 2022-08-10 RX ADMIN — HEPARIN SODIUM 5000 UNITS: 5000 INJECTION, SOLUTION INTRAVENOUS; SUBCUTANEOUS at 13:58

## 2022-08-10 RX ADMIN — DEXMEDETOMIDINE 0.6 MCG/KG/HR: 200 INJECTION, SOLUTION INTRAVENOUS at 15:13

## 2022-08-10 RX ADMIN — Medication 5 MCG/MIN: at 00:51

## 2022-08-10 RX ADMIN — COLLAGENASE SANTYL 1 APPLICATION.: 250 OINTMENT TOPICAL at 08:59

## 2022-08-10 RX ADMIN — MIDAZOLAM HYDROCHLORIDE 2 MG: 1 INJECTION, SOLUTION INTRAMUSCULAR; INTRAVENOUS at 06:37

## 2022-08-10 RX ADMIN — CEFEPIME 2 G: 2 INJECTION, POWDER, FOR SOLUTION INTRAVENOUS at 12:58

## 2022-08-10 RX ADMIN — HEPARIN SODIUM 5000 UNITS: 5000 INJECTION, SOLUTION INTRAVENOUS; SUBCUTANEOUS at 21:50

## 2022-08-10 RX ADMIN — CHLORHEXIDINE GLUCONATE 15 ML: 1.2 SOLUTION ORAL at 09:04

## 2022-08-10 RX ADMIN — HYDROMORPHONE HYDROCHLORIDE 0.5 MG: 1 INJECTION, SOLUTION INTRAMUSCULAR; INTRAVENOUS; SUBCUTANEOUS at 02:21

## 2022-08-10 NOTE — PLAN OF CARE
Problem: Adult Inpatient Plan of Care  Goal: Plan of Care Review  Outcome: Progressing  Flowsheets (Taken 8/10/2022 1130)  Progress: no change  Plan of Care Reviewed With: other (see comments)     Per care progression rounds, ET tube was replaced yesterday with paralytics. Continues to be intubated and sedated. Plan for family meeting for goals of care at the end of the week. CM to continue to follow for dispo planning.... Orquidea Herndon, [phone]

## 2022-08-10 NOTE — PLAN OF CARE
Problem: Adult Inpatient Plan of Care  Goal: Plan of Care Review  Outcome: Progressing  Flowsheets (Taken 8/10/2022 1534)  Progress: improving  Plan of Care Reviewed With:   patient   family  Outcome Summary: sedation weaned throughout shift. fentanyl off and precedex weaned. insuling gtt remains in place. tube feeds going thrugh og t tube and patient tolerating well. patient able to minimally follow commands to wiggle fingers and toes. urine output remains adequate. swan catheter removed.  Goal: Patient-Specific Goal (Individualized)  Outcome: Progressing  Flowsheets (Taken 8/10/2022 1534)  Patient-Specific Goals (Include Timeframe): extubate when stable  Individualized Care Needs: q2 turns  Goal: Absence of Hospital-Acquired Illness or Injury  Outcome: Progressing  Goal: Optimal Comfort and Wellbeing  Outcome: Progressing  Goal: Readiness for Transition of Care  Outcome: Progressing   Plan of Care Review  Plan of Care Reviewed With: patient, family  Progress: improving  Outcome Summary: sedation weaned throughout shift. fentanyl off and precedex weaned. insuling gtt remains in place. tube feeds going thrugh og t tube and patient tolerating well. patient able to minimally follow commands to wiggle fingers and toes. urine output remains adequate. swan catheter removed.

## 2022-08-10 NOTE — PLAN OF CARE
Plan of Care Review  Plan of Care Reviewed With: patient  Progress: no change  Outcome Summary: Levo titrated to maintain MAP >65. Afebrile. Remains on fentanyl and precedex. In AM, ETT noticed to be dislodged and was ultimately replaced by anesthesia at bedside without issues. Bronch also completed at bedside at this time. Patient tolerated procedure with no complications.

## 2022-08-10 NOTE — PROGRESS NOTES
SURGICAL CRITICAL CARE DAILY PROGRESS NOTE     PATIENT NAME:  Samy Elena YOB: 1930    AGE:  89 y.o.  GENDER: male   MRN:  [id number]  PATIENT #: [id number]     SUBJECTIVE   Overnight, had dislodgement of ET tube by 6cm following turn. Repositioning yielded low tidal volumes (50cc) and no noticeable chest rise. ETT was repositioned and subsequently exchanged by anesthesia. Bronch by SICU attending confirmed position. Was administered paralytic during repositioning.     Otherwise, remains intubated in ICU. Alert and nods appropriately to questions. Tolerating tube feeds at goal.    Currently requiring Levo of 11. Doubutamine off. Vaso off. Mixed venous O2 60. PRVC 40%/450/20/6 overnight    ID following. On Cefepime 2g Q12.      Started on insulin ggt for uncontrolled sugars in the 200-300s. Last BG on insulin ggt was scale insulin increased yesterday. BG this AM were 200-300. Will further increase SSI and consider Endo consult.     VITAL SIGNS     Temperature:   No data recorded.       Last 24 hours:    Heart Rate:  [] 68  Resp:  [22-30] 24  BP: ()/(37-90) 118/61  FiO2 (%) (Set):  [40 %] 40 %    VENT SETTINGS:   Vent Mode: Pressure support  FiO2 (%) (Set):  [40 %] 40 %  S RR:  [20] 20  S VT:  [500 mL] 500 mL  PEEP/CPAP (Set, cmH2O):  [6 cm H20] 6 cm H20  MAP (Observed, cmH2O):  [8-12] 8    INTAKE & OUTPUT     I/O last 3 completed shifts:  In: 4948.8 [I.V.:1608.8; NG/GT:2790; IV Piggyback:550]  Out: 3295 [Urine:2720; Stool:575]    Intake/Output Summary (Last 24 hours) at 8/10/2022 0909  Last data filed at 8/10/2022 0900  Gross per 24 hour   Intake 3614.93 ml   Output 2240 ml   Net 1374.93 ml     I/O this shift:  In: 375.7 [I.V.:180.7; NG/GT:195]  Out: 195 [Urine:195]     MEDICATIONS     Infusions:    • dexmedetomidine in 0.9 % NaCl  0.2-1.5 mcg/kg/hr 1 mcg/kg/hr (08/10/22 0900)   • fentaNYL  0-200 mcg/hr 50 mcg/hr (08/10/22 0900)   • insulin  0-15 Units/hr 0.26 Units/hr (08/10/22  0900)   • norepinephrine  0.5-90 mcg/min 11 mcg/min (08/10/22 0900)            Scheduled:   • cefepime  2 g intravenous q12h INT   • chlorhexidine  15 mL Mouth/Throat 2 times per day   • collagenase  1 application Topical Daily   • heparin (porcine)  5,000 Units subcutaneous q8h IVÁN   • metoprolol  2.5 mg intravenous q6h INT   • pantoprazole  40 mg intravenous q24h   • sodium hypochlorite   Topical Daily       Antibiotics:  Anti-infectives (From admission, onward)    Start     Dose/Rate Route Frequency Ordered Stop    08/05/22 1200  ceFEPIme (MAXIPIME) 2 g in 100 mL NSS vial in bag         2 g  200 mL/hr over 30 Minutes intravenous Every 12 hours interval 08/05/22 1110            PRN:     acetaminophen, 650 mg, q4h PRN  glucose, 16-32 g of dextrose, PRN   Or  dextrose, 15-30 g of dextrose, PRN   Or  glucagon, 1 mg, PRN   Or  dextrose 50 % in water (D50), 25 mL, PRN  glucose, 16-32 g of dextrose, PRN   Or  dextrose, 15-30 g of dextrose, PRN   Or  glucagon, 1 mg, PRN   Or  dextrose 50 % in water (D50), 25 mL, PRN  dextrose 50 % in water (D50), 10-30 mL, PRN  fentaNYL, 25 mcg, q5 min PRN  HYDROmorphone, 0.5 mg, q3h PRN         DIAGNOSTIC DATA     LABS:  Recent Results (from the past 24 hour(s))   Oxygen saturation, venous    Collection Time: 08/09/22 10:44 AM   Result Value Ref Range    Oxygen Saturation, Venous 72 (H) 30 - 60 %   POCT Glucose    Collection Time: 08/09/22 11:03 AM   Result Value Ref Range    POCT Bedside Glucose 242 (H) 70 - 99 mg/dL    POC Test POC    POCT Glucose    Collection Time: 08/09/22 12:11 PM   Result Value Ref Range    POCT Bedside Glucose 230 (H) 70 - 99 mg/dL    POC Test POC    Blood Gas, arterial Comprehensive    Collection Time: 08/09/22 12:19 PM   Result Value Ref Range    pH, Arterial 7.38 7.35 - 7.45    pCO2, Arterial 42 35 - 48 mm Hg    pO2, Arterial 175 (H) 83 - 100 mm Hg    HCO3, Arterial 24.6 21.0 - 28.0 mEQ/L    Base Excess, Arterial -0.4 mEQ/L    O2 Sat, Arterial 98 93 - 98 %     TCO2, Arterial 26.1 22.0 - 32.0 mEQ/L    Lactate, Art 1.0 0.4 - 1.6 mmol/L    Glucose, Arterial 263 (H) 70 - 99 mg/dL    Sodium, Arterial 151 (H) 136 - 145 mEQ/L    Potassium, Arterial 4.5 3.4 - 4.5 mEQ/L    Chloride, Arterial 121 (H) 98 - 109 mEQ/L    Ionized Calcium, Arterial 1.19 1.15 - 1.27 mmol/L    Hemoglobin, Arterial 11.9 (L) 14.0 - 17.5 g/dL    Carboxyhemoglobin 1.7 0.0 - 3.0; (Smokers: 0.0 - 9.0) %    Methemoglobin 0.7 0.4 - 1.5 %    Source Of Oxygen mv     FIO2 20/500/40/6    POCT Glucose    Collection Time: 08/09/22  1:15 PM   Result Value Ref Range    POCT Bedside Glucose 210 (H) 70 - 99 mg/dL    POC Test POC    POCT Glucose    Collection Time: 08/09/22  2:29 PM   Result Value Ref Range    POCT Bedside Glucose 174 (H) 70 - 99 mg/dL    POC Test POC    POCT Glucose    Collection Time: 08/09/22  3:58 PM   Result Value Ref Range    POCT Bedside Glucose 190 (H) 70 - 99 mg/dL    POC Test POC    POCT Glucose    Collection Time: 08/09/22  6:04 PM   Result Value Ref Range    POCT Bedside Glucose 154 (H) 70 - 99 mg/dL    POC Test POC    Lactic acid, Venous    Collection Time: 08/09/22  6:15 PM   Result Value Ref Range    Lactate 1.2 0.4 - 2.0 mmol/L   Oxygen saturation, venous    Collection Time: 08/09/22  6:15 PM   Result Value Ref Range    Oxygen Saturation, Venous 60 30 - 60 %   Comprehensive metabolic panel    Collection Time: 08/09/22  6:15 PM   Result Value Ref Range    Sodium 154 (H) 136 - 144 mEQ/L    Potassium 4.1 3.6 - 5.1 mEQ/L    Chloride 123 (H) 98 - 109 mEQ/L    CO2 27 22 - 32 mEQ/L    BUN 56 (H) 8 - 20 mg/dL    Creatinine 1.0 0.8 - 1.3 mg/dL    Glucose 160 (H) 70 - 99 mg/dL    Calcium 7.8 (L) 8.9 - 10.3 mg/dL    AST (SGOT) 33 15 - 41 IU/L    ALT (SGPT) 16 16 - 63 IU/L    Alkaline Phosphatase 65 35 - 126 IU/L    Total Protein 5.3 (L) 6.0 - 8.2 g/dL    Albumin 1.8 (L) 3.4 - 5.0 g/dL    Bilirubin, Total 1.0 0.3 - 1.2 mg/dL    eGFR >60.0 >=60.0 mL/min/1.73m*2    Anion Gap 4 3 - 15 mEQ/L   POCT Glucose     Collection Time: 08/09/22  8:07 PM   Result Value Ref Range    POCT Bedside Glucose 102 (H) 70 - 99 mg/dL    POC Test POC    POCT Glucose    Collection Time: 08/09/22  9:09 PM   Result Value Ref Range    POCT Bedside Glucose 95 70 - 99 mg/dL    POC Test POC    POCT Glucose    Collection Time: 08/09/22  9:56 PM   Result Value Ref Range    POCT Bedside Glucose 77 70 - 99 mg/dL    POC Test POC    POCT Glucose    Collection Time: 08/09/22 11:00 PM   Result Value Ref Range    POCT Bedside Glucose 118 (H) 70 - 99 mg/dL    POC Test POC    POCT Glucose    Collection Time: 08/10/22 12:10 AM   Result Value Ref Range    POCT Bedside Glucose 148 (H) 70 - 99 mg/dL    POC Test POC    POCT Glucose    Collection Time: 08/10/22 12:54 AM   Result Value Ref Range    POCT Bedside Glucose 143 (H) 70 - 99 mg/dL    POC Test POC    POCT Glucose    Collection Time: 08/10/22  1:57 AM   Result Value Ref Range    POCT Bedside Glucose 163 (H) 70 - 99 mg/dL    POC Test POC    POCT Glucose    Collection Time: 08/10/22  2:59 AM   Result Value Ref Range    POCT Bedside Glucose 174 (H) 70 - 99 mg/dL    POC Test POC    POCT Glucose    Collection Time: 08/10/22  4:14 AM   Result Value Ref Range    POCT Bedside Glucose 143 (H) 70 - 99 mg/dL    POC Test POC    POCT Glucose    Collection Time: 08/10/22  5:00 AM   Result Value Ref Range    POCT Bedside Glucose 163 (H) 70 - 99 mg/dL    POC Test POC    CBC    Collection Time: 08/10/22  6:31 AM   Result Value Ref Range    WBC 19.34 (H) 3.80 - 10.50 K/uL    RBC 3.57 (L) 4.50 - 5.80 M/uL    Hemoglobin 12.6 (L) 13.7 - 17.5 g/dL    Hematocrit 39.3 (L) 40.1 - 51.0 %    .1 (H) 83.0 - 98.0 fL    MCH 35.3 (H) 28.0 - 33.2 pg    MCHC 32.1 (L) 32.2 - 36.5 g/dL    RDW 15.5 (H) 11.6 - 14.4 %    Platelets 89 (L) 150 - 350 K/uL    MPV 12.0 9.4 - 12.4 fL   Magnesium    Collection Time: 08/10/22  6:31 AM   Result Value Ref Range    Magnesium 2.3 1.8 - 2.5 mg/dL   Phosphorus    Collection Time: 08/10/22  6:31 AM    Result Value Ref Range    Phosphorus 1.9 (L) 2.4 - 4.7 mg/dL   Protime-INR    Collection Time: 08/10/22  6:31 AM   Result Value Ref Range    PT 17.9 (H) 12.2 - 14.5 sec    INR 1.5     Lactic acid, Venous    Collection Time: 08/10/22  6:31 AM   Result Value Ref Range    Lactate 0.7 0.4 - 2.0 mmol/L   Calcium, ionized    Collection Time: 08/10/22  6:31 AM   Result Value Ref Range    Ionized Calcium, Venous 1.24 1.15 - 1.27 mmol/L   Oxygen saturation, venous    Collection Time: 08/10/22  6:31 AM   Result Value Ref Range    Oxygen Saturation, Venous 80 (H) 30 - 60 %   Comprehensive metabolic panel    Collection Time: 08/10/22  6:31 AM   Result Value Ref Range    Sodium 154 (H) 136 - 144 mEQ/L    Potassium 4.5 3.6 - 5.1 mEQ/L    Chloride 121 (H) 98 - 109 mEQ/L    CO2 27 22 - 32 mEQ/L    BUN 54 (H) 8 - 20 mg/dL    Creatinine 0.9 0.8 - 1.3 mg/dL    Glucose 137 (H) 70 - 99 mg/dL    Calcium 8.3 (L) 8.9 - 10.3 mg/dL    AST (SGOT) 41 15 - 41 IU/L    ALT (SGPT) 17 16 - 63 IU/L    Alkaline Phosphatase 76 35 - 126 IU/L    Total Protein 5.5 (L) 6.0 - 8.2 g/dL    Albumin 1.8 (L) 3.4 - 5.0 g/dL    Bilirubin, Total 1.3 (H) 0.3 - 1.2 mg/dL    eGFR >60.0 >=60.0 mL/min/1.73m*2    Anion Gap 6 3 - 15 mEQ/L   POCT Glucose    Collection Time: 08/10/22  6:45 AM   Result Value Ref Range    POCT Bedside Glucose 109 (H) 70 - 99 mg/dL    POC Test POC    POCT Glucose    Collection Time: 08/10/22  8:00 AM   Result Value Ref Range    POCT Bedside Glucose 107 (H) 70 - 99 mg/dL    POC Test POC    POCT Glucose    Collection Time: 08/10/22  8:56 AM   Result Value Ref Range    POCT Bedside Glucose 86 70 - 99 mg/dL    POC Test POC      Serum creatinine: 0.9 mg/dL 08/10/22 0631  Estimated creatinine clearance: 61.7 mL/min  Microbiology Results     Procedure Component Value Units Date/Time    MRSA Screen, Nares Only Nose [[id number]]  (Normal) Collected: 08/02/22 2248    Specimen: Nasal Swab from Nose Updated: 08/03/22 0030     MRSA DNA, Nares Negative     SARS-CoV-2 (COVID-19), PCR Nasopharynx [[id number]]  (Normal) Collected: 08/02/22 1633    Specimen: Nasopharyngeal Swab from Nasopharynx Updated: 08/02/22 1807    Narrative:      The following orders were created for panel order SARS-CoV-2 (COVID-19), PCR Nasopharynx.  Procedure                               Abnormality         Status                     ---------                               -----------         ------                     SARS-CoV-2 (COVID-19), P...[[id number]]  Normal              Final result                 Please view results for these tests on the individual orders.    SARS-CoV-2 (COVID-19), PCR Nasopharynx [[id number]]  (Normal) Collected: 08/02/22 1633    Specimen: Nasopharyngeal Swab from Nasopharynx Updated: 08/02/22 1807     SARS-CoV-2 (COVID-19) Negative    Narrative:      Testing performed using real-time PCR for detection of COVID-19. EUA approved validation studies performed on site.     Anaerobic Culture / Smear (includes Aerobic Culture) [[id number]] Collected: 08/02/22 1536    Specimen: Abscess Fluid from Buttock, Left Updated: 08/02/22 2247     Gram Stain Result 3+ WBC      2+ Gram positive cocci      2+ Gram positive bacilli    SARS-CoV-2 (COVID-19), PCR Nasopharynx [[id number]]  (Normal) Collected: 07/25/22 1341    Specimen: Nasopharyngeal Swab from Nasopharynx Updated: 07/25/22 1525    Narrative:      The following orders were created for panel order SARS-CoV-2 (COVID-19), PCR Nasopharynx.  Procedure                               Abnormality         Status                     ---------                               -----------         ------                     SARS-CoV-2 (COVID-19), P...[[id number]]  Normal              Final result                 Please view results for these tests on the individual orders.    SARS-CoV-2 (COVID-19), PCR Nasopharynx [[id number]]  (Normal) Collected: 07/25/22 1341    Specimen: Nasopharyngeal Swab from Nasopharynx Updated: 07/25/22 1525     SARS-CoV-2  (COVID-19) Negative    SARS-CoV-2 (COVID-19), PCR Nasopharynx [[id number]]  (Normal) Collected: 07/22/22 2001    Specimen: Nasopharyngeal Swab from Nasopharynx Updated: 07/22/22 2125    Narrative:      The following orders were created for panel order SARS-CoV-2 (COVID-19), PCR Nasopharynx.  Procedure                               Abnormality         Status                     ---------                               -----------         ------                     SARS-CoV-2 (COVID-19), P...[[id number]]  Normal              Final result                 Please view results for these tests on the individual orders.    SARS-CoV-2 (COVID-19), PCR Nasopharynx [[id number]]  (Normal) Collected: 07/22/22 2001    Specimen: Nasopharyngeal Swab from Nasopharynx Updated: 07/22/22 2125     SARS-CoV-2 (COVID-19) Negative    Narrative:      Testing performed using real-time PCR for detection of COVID-19. EUA approved validation studies performed on site.           IMAGING:  No results found.    Radiology Imaging    XR CHEST 1 VW    Narrative  CLINICAL HISTORY: Ataxia.    --    Impression  Findings suspicious for congestive heart failure.    COMMENT: AP radiograph of the chest was obtained.  We have no prior similar  studies for comparison.  There is cardiomegaly.  There is vascular  congestion/interstitial edema.  There are bilateral effusions.  Findings suggest  congestive heart failure.  Sternal sutures are seen.  There is no pneumothorax.  We have no prior similar studies for comparison.       Lines, Drains, Airways, Wounds:     CVC Triple Lumen 08/02/22 Internal jugular (Active)   Number of days: 1       Peripheral IV (Adult) 07/22/22 Left Forearm (Active)   Number of days: 12       Peripheral IV (Adult) 08/02/22 Right Forearm (Active)   Number of days: 1       Peripheral IV (Adult) 08/02/22 Anterior;Left;Proximal Forearm (Active)   Number of days: 1       NG/OG Tube 08/02/22 Center mouth (Active)   Number of days: 1       Urethral  Catheter Temperature probe 16 Fr (Active)   Number of days: 1       ETT  (Active)   Number of days: 1       Arterial Line 08/02/22 Left Radial (Active)   Number of days: 1       Surgical Incision Buttock (Active)   Number of days: 1       PHYSICAL EXAM     General appearance: Intubated and minimally sedated.   Head: normocephalic, without obvious abnormality, atraumatic. ETT in place.   Eyes: negative, no scleral icterus.   Nose: no discharge  Neck: no JVD, symmetrical, trachea midline.    RIJ CVC in place. Left neck with swan odalis catheter in place  Lungs: Mechanical ventilation.   Chest wall: No deformities or palpable masses  Heart: Regular rate and rhythm.   Abdomen: soft, moderately distended, non-tender.   Ostomy pink, protruding, perfused, productive of gas and stool.  Genitalia: edematous, erythematous. No streaking or crepitous.    Incisions clean, dry, intact. Erythema stable.   Extremities: extremities warm and well-perfused.   Right A-Line in place. Good waveform.   Pulses: 2+ and symmetric.  Skin: Skin color, texture, turgor normal.  Neurologic: Unable to assess secondary to sedation.       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 male HD#8 s/p EUA, wound debridement for necrotizing soft tissue infection of the perirectal and perineal tissue on 8/2. 6 Days Post-Op    NEURO: Intubated & sedated. On Precedex 1.0 & Fentanyl 50. Follows commands.     CV: aFib. HR 70-100s. Levo of 11. CUFF PRESSURES for pressor titration. Metop 2.5mg Q6. PA pressure 35/14. EF 40-45% (8/3)   PULM: ETT. 20/450/40%/6. ETT exchanged on 8/10. SBT today.   GI:  NPO. PPI. LUQ end-colostomy. Productive gas & stool. Tube feeds  at  goal.   : Fc. Cr: 1.0 (1.1). 0>1.0cc/kg/hr UOP. Genitalia erythematous. Daily dressing changes.  HEME: HgB of 12.6. plts 89 this am.  ID: WBC: 19.34. Pancultures.  NSTI. Cefipime. OR Cx: 2+ E. Coli, 2+ streptoccus anginous, 2+ enterobacter. Sputum w/ 4+ yeast.   BC from 8/8 w/ NG48.   ENDO: Insulin ggt..   DVT PPX: SCDs & sq heparin TID  GI PPX:  PPI  DISPOSITION:  SICU.     Please page [phone] or [phone] with any questions or concerns.  Connor Evans MD  8/10/2022  9:09 AM

## 2022-08-10 NOTE — PROGRESS NOTES
"Infectious Disease Progress Note    Patient Name: Samy Elena  MR#: [id number]  : 10/6/1930  Admission Date: 2022  Date: 08/10/22   Time: 7:48 AM   Author: Toni Ramirez MD        Antibiotics:    Anti-infectives (From admission, onward)    Start     Dose/Rate Route Frequency Ordered Stop    22 1200  ceFEPIme (MAXIPIME) 2 g in 100 mL NSS vial in bag         2 g  200 mL/hr over 30 Minutes intravenous Every 12 hours interval 22 1110            Subjective   Remains critical and doing poorly.  Given poor ventilation, he was evaluated and ETT noticed to be dislodged and was ultimately replaced by anesthesia at bedside.  Bronchoscopy was performed.  Currently, seems to be oxygenating well and back on Minimal ventilatory requirements.    Objective     Vital Signs:    Visit Vitals  BP (!) 103/57   Pulse 68   Temp 37.1 °C (98.8 °F) (Bladder)   Resp (!) 24   Ht 1.854 m (6' 1\")   Wt 80.3 kg (177 lb)   SpO2 99%   BMI 23.35 kg/m²     Vent Mode: Pressure regulated volume control  FiO2 (%) (Set):  [40 %] 40 %  S RR:  [20] 20  S VT:  [450 mL-500 mL] 500 mL  PEEP/CPAP (Set, cmH2O):  [6 cm H20] 6 cm H20  MAP (Observed, cmH2O):  [8-12] 12    Physical Exam:       General: Elderly man, intubated, sedated, pale and frail looking.  HEENT: NC/AT/ anicteric sclera, pharynx clear, ET tube in place, NG tube in place draining scant bilious material  Neck: supple, no meningismus, right IJ TLC, left IJ [place]-Niki catheter  Cardiovascular: regular S1/S2, distant sounds, soft systolic murmur.   Respiratory: clear to auscultation anteriorly  GI/Abdomen: Obese,  bowel sounds decreased, colostomy bag in place with gas and a scant serosanguineous material..  Abdomen does not appear tender.  Extremities: Edematous extremities   JOINTS:  No swelling, erythema, or pain  Lymphatics: no lymphadenopathy  Neurology: Intubated and sedated   psych: Intubated and sedated   skin: There is a large surgical wound in the perineum " extending into left ischial area which is clean and no evidence of necrosis noted in pictures.  Blisters in penile shaft have denuded and expose healthy looking  subcutaneous tissue.  No crepitus appreciated, no necrosis.  G.U.: Penis and scrotum edema, no crepitus.  Will in place.  Significant area of denudation of skin of penis  Lines: Right IJ TLC C/D/I.  Left IJ [place]-Niki catheter      Lines, Drains, Airways, Wounds:  Introducer 08/04/22 Left Internal jugular (Active)   Number of days: 6       CVC Triple Lumen 08/02/22 Internal jugular (Active)   Number of days: 8       Peripheral IV (Adult) 08/02/22 Right Forearm (Active)   Number of days: 8       Peripheral IV (Adult) 08/02/22 Anterior;Left;Proximal Forearm (Active)   Number of days: 8       NG/OG Tube 08/02/22 Center mouth (Active)   Number of days: 8       Colostomy Other (comment) LUQ (Active)   Number of days: 7       Urethral Catheter Temperature probe 16 Fr (Active)   Number of days: 8       ETT  (Active)   Number of days: 0       Surgical Incision Buttock (Active)   Number of days: 8       Surgical Incision Abdomen (Active)   Number of days: 7       Surgical Incision Perineum (Active)   Number of days: 7       Catheterization Site - Venous Left Internal Jugular 7 Fr. (Active)   Number of days: 6       Labs:    CBC Results       08/10/22 08/09/22 08/08/22     0631 0414 1138    WBC 19.34 16.57 16.06    RBC 3.57 3.59 3.60    HGB 12.6 12.3 12.2    HCT 39.3 37.8 38.4    .1 105.3 106.7    MCH 35.3 34.3 33.9    MCHC 32.1 32.5 31.8    PLT 89 84 101         Comment for PLT at 0631 on 08/10/22: CONSISTENT WITH PREVIOUS RESULTS    Comment for PLT at 1138 on 08/08/22: RESULTS CHECKED. RESULTS OBTAINED AFTER VORTEXING TO ELIMINATE PLT CLUMPS        CMP Results       08/10/22 08/09/22 08/09/22     0631 1815 0414     154 149    K 4.5 4.1 4.2    Cl 121 123 121    CO2 27 27 24    Glucose 137 160 288    BUN 54 56 59    Creatinine 0.9 1.0 1.0    Calcium 8.3  7.8 7.8    Anion Gap 6 4 4    AST 41 33 31    ALT 17 16 17    Albumin 1.8 1.8 1.8    EGFR >60.0 >60.0 >60.0            Micro:  Microbiology Results     Procedure Component Value Units Date/Time    Sputum culture / smear Expectorated Sputum [[id number]]  (Abnormal) Collected: 08/08/22 0311    Specimen: Expectorated Sputum Updated: 08/10/22 0705    Narrative:      The following orders were created for panel order Sputum culture / smear Expectorated Sputum.  Procedure                               Abnormality         Status                     ---------                               -----------         ------                     Sputum Gram Stain (Lab O...[[id number]]                      Final result               Sputum Culture (Lab Only...[[id number]]  Abnormal            Final result                 Please view results for these tests on the individual orders.    Sputum Gram Stain (Lab Only) Expectorated Sputum [[id number]] Collected: 08/08/22 0311    Specimen: Expectorated Sputum Updated: 08/08/22 1038     Gram Stain Result 2+ WBC      No Epithelial Cells Seen      2+ Yeast    Sputum Culture (Lab Only) Expectorated Sputum [[id number]]  (Abnormal) Collected: 08/08/22 0311    Specimen: Expectorated Sputum Updated: 08/10/22 0705     Culture **Positive Culture**      4+ Yeast, No Further Identification      No Normal Jennifer    Blood Culture Blood, Venous [[id number]]  (Normal) Collected: 08/08/22 0235    Specimen: Blood, Venous Updated: 08/10/22 0300     Culture No growth at 48 hours    Blood Culture Blood, Venous [[id number]]  (Normal) Collected: 08/08/22 0235    Specimen: Blood, Venous Updated: 08/10/22 0300     Culture No growth at 48 hours    MRSA Screen, Nares Only Nose [[id number]]  (Normal) Collected: 08/02/22 2248    Specimen: Nasal Swab from Nose Updated: 08/03/22 0030     MRSA DNA, Nares Negative    SARS-CoV-2 (COVID-19), PCR Nasopharynx [[id number]]  (Normal) Collected: 08/02/22 1633    Specimen: Nasopharyngeal Swab  from Nasopharynx Updated: 08/02/22 1807    Narrative:      The following orders were created for panel order SARS-CoV-2 (COVID-19), PCR Nasopharynx.  Procedure                               Abnormality         Status                     ---------                               -----------         ------                     SARS-CoV-2 (COVID-19), P...[[id number]]  Normal              Final result                 Please view results for these tests on the individual orders.    SARS-CoV-2 (COVID-19), PCR Nasopharynx [[id number]]  (Normal) Collected: 08/02/22 1633    Specimen: Nasopharyngeal Swab from Nasopharynx Updated: 08/02/22 1807     SARS-CoV-2 (COVID-19) Negative    Narrative:      Testing performed using real-time PCR for detection of COVID-19. EUA approved validation studies performed on site.     Blood Culture Blood, Venous [[id number]]  (Normal) Collected: 08/02/22 1628    Specimen: Blood, Venous Updated: 08/07/22 1901     Culture No growth at 120 hours    Blood Culture Blood, Venous [[id number]]  (Normal) Collected: 08/02/22 1628    Specimen: Blood, Venous Updated: 08/07/22 1901     Culture No growth at 120 hours    Anaerobic Culture / Smear (includes Aerobic Culture) [[id number]]  (Abnormal)  (Susceptibility) Collected: 08/02/22 1536    Specimen: Abscess Fluid from Buttock, Left Updated: 08/07/22 0824     Culture **Positive Culture**      2+ Escherichia coli      2+ Streptococcus anginosus      2+ Enterobacter cloacae Complex      Mixed Anaerobic Growth     Gram Stain Result 3+ WBC      2+ Gram positive cocci      2+ Gram positive bacilli    SARS-CoV-2 (COVID-19), PCR Nasopharynx [[id number]]  (Normal) Collected: 07/25/22 1341    Specimen: Nasopharyngeal Swab from Nasopharynx Updated: 07/25/22 1525    Narrative:      The following orders were created for panel order SARS-CoV-2 (COVID-19), PCR Nasopharynx.  Procedure                               Abnormality         Status                     ---------                                -----------         ------                     SARS-CoV-2 (COVID-19), P...[[id number]]  Normal              Final result                 Please view results for these tests on the individual orders.    SARS-CoV-2 (COVID-19), PCR Nasopharynx [[id number]]  (Normal) Collected: 07/25/22 1341    Specimen: Nasopharyngeal Swab from Nasopharynx Updated: 07/25/22 1525     SARS-CoV-2 (COVID-19) Negative    SARS-CoV-2 (COVID-19), PCR Nasopharynx [[id number]]  (Normal) Collected: 07/22/22 2001    Specimen: Nasopharyngeal Swab from Nasopharynx Updated: 07/22/22 2125    Narrative:      The following orders were created for panel order SARS-CoV-2 (COVID-19), PCR Nasopharynx.  Procedure                               Abnormality         Status                     ---------                               -----------         ------                     SARS-CoV-2 (COVID-19), P...[[id number]]  Normal              Final result                 Please view results for these tests on the individual orders.    SARS-CoV-2 (COVID-19), PCR Nasopharynx [[id number]]  (Normal) Collected: 07/22/22 2001    Specimen: Nasopharyngeal Swab from Nasopharynx Updated: 07/22/22 2125     SARS-CoV-2 (COVID-19) Negative    Narrative:      Testing performed using real-time PCR for detection of COVID-19. EUA approved validation studies performed on site.               Imaging:    Radiology Imaging    XR CHEST 1 VW    Narrative  CLINICAL HISTORY: Tube positioning.    COMMENT:    Comparison: 8/10/2022 5:11 AM    Technique: Single portable AP view of the thorax was performed.    Findings:  Tubes and Lines: Endotracheal tube is with its tip approximately 3 cm from the  level of the pamella. Nasogastric tube seen traversing the level of the  diaphragm, however the tip is not visualized. Right internal jugular central  venous catheter is with its tip overlying the region of the cavoatrial junction.  Left internal jugular [place]-Niki catheter is with its tip  overlying the region of  the right main pulmonary artery. Overlying cardiac leads.    Lungs: Suggestion of layering right greater than left pleural effusions with  associated bibasilar opacities, likely similar compared to earlier study of the  same day. No pneumothorax is identified.    Cardiomediastinal silhouette: Stable cardiomegaly. Uncoiled thoracic aorta.  Median sternotomy wires.    --    Impression  Endotracheal tube with tip approximately 3 cm in the level of the pamella.  Additional tubes and lines as described. Suggestion of layering right greater  than left pleural effusions with associated bibasilar opacities, likely similar  compared to earlier study of the same day.      Assessment   89 years old male with history of CHF, A. fib not on anticoagulation due to prior history of GI bleed, tricuspid regurgitation, pulmonary hypertension, coronary disease, who was recently discharged from hospital after he presented with slurred speech and hypotension.  Work-up for CVA was negative and it was concluded that he was suffering from orthostatic hypotension.  Medications were adjusted and he was discharged to rehabilitation facility where he developed buttocks and perineum pain.  He was noted to have necrotizing changes during surgical consultation and admitted to hospital for surgical debridement.  Intraoperatively, significant necrotizing process noted.  Multiple cultures were obtained showing polymicrobial process.  Taken for second look without evidence of additional necrotizing changes.       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  CHF, pulmonary edema  3.  Medical therapy with Jardiance- unclear if for coronary artery disease/CHF management or hyperglycemia.  4.  Septic and cardiogenic shock  5.  Estimated Creatinine Clearance: 61.7 mL/min (by C-G formula based on SCr of 0.9 mg/dL).            Plan   1.  Continue therapy with cefepime to complete 14 days of treatment.  Today is day 8  of 14.  2.  Persistent leukocytosis and septic/cardiogenic shock under management per ICU team.  3.  Please obtain CBC with differential tomorrow.  4.  Continue supportive care per ICU.  5.  We will follow with you.    More than 35 minutes were spent obtaining a detailed interval history, detailed exam, evaluating this high complexity case with significant medical decision making including continuation of antibiotic therapy, coordination of care with primary team and consultants.      KEN BARNETT MD  INFECTIOUS DISEASES    NOTE: Some or all of the note above was created with the use of dictation software, please note this dictation software can have anomalies in its ability to transcribe. Please contact me directly for anything that seems abnormal for clarification.

## 2022-08-10 NOTE — PROGRESS NOTES
Cardiology Progress Note   [facility]    [facility] [address]  [address] [place] [address]    TEL  [phone]  [facility]       Patient: Samy Elena  MRN: [id number]  : 10/6/1930  Admission Date: 2022   Consult Date: 08/10/22  Reason for Consult: H/o HFrEF, PH, atrial fibrillation -postop management   Outpatient Cardiologist: Dr. Yovanny Cruz ( office visit 2022)     Interval History:   Seen and examined this am.   He is intubated and sedated.   Early morning events discussed with surgical team     HPI:   Samy Elena is a 89 y.o. 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    Cardiology is being consulted for management recommendations given his extensive cardiac history.     Her chart review, the patient had a recent admission at the end of July with changes in speech. CVA was ruled out at the time of that admission .symptoms was attributed to  postural/orthostatic changes related to symptomatic hypotension. Patient was discharged to rehabilitation unit where he developed  buttocks and pubic pain.He was referred to be seen by surgery as outpatient that lead to current admission.       Past Medical History:   Diagnosis Date   • Atrial fibrillation (CMS/HCC)    • Disease of thyroid gland    • GI (gastrointestinal bleed)    • Hypertension    • Myocardial infarction (CMS/HCC)           Past Surgical History:   Procedure Laterality Date   • BYPASS GRAFT         Social History     Socioeconomic History   • Marital status:      Spouse name: Not on file   • Number of children: Not on file   • Years of education: Not on file   • Highest education level: Not on file   Occupational History   • Not on file   Tobacco Use   •  Smoking status: Never Smoker   • Smokeless tobacco: Never Used   Substance and Sexual Activity   • Alcohol use: Not on file   • Drug use: Not on file   • Sexual activity: Not on file   Other Topics Concern   • Not on file   Social History Narrative   • Not on file     Social Determinants of Health     Financial Resource Strain: Not on file   Food Insecurity: No Food Insecurity   • Worried About Running Out of Food in the Last Year: Never true   • Ran Out of Food in the Last Year: Never true   Transportation Needs: Not on file   Physical Activity: Not on file   Stress: Not on file   Social Connections: Not on file   Intimate Partner Violence: Not on file   Housing Stability: Not on file        History reviewed. No pertinent family history.     Jardiance [empagliflozin]    Current Outpatient Medications   Medication Instructions   • acetaminophen (TYLENOL) 325 mg tablet oral   • bisacodyL (DULCOLAX) 10 mg suppository rectal   • clopidogreL (PLAVIX) 75 mg, oral, Daily   • coenzyme Q10 (COQ10) 100 mg, oral, Daily   • empagliflozin (JARDIANCE) 10 mg, oral, Daily   • levothyroxine (SYNTHROID) 100 mcg, oral, Daily (6:30a)   • metoprolol succinate XL (TOPROL-XL) 100 mg, oral, Daily   • multivitamin (THERAGRAN) tablet oral, Daily   • sacubitriL-valsartan (ENTRESTO) 49-51 mg per tablet 1 tablet, oral, 2 times daily   • sildenafiL (pulm.hypertension) (REVATIO) 20 mg, oral, 3 times daily   • simvastatin (ZOCOR) 10 mg, oral, Nightly   • spironolactone (ALDACTONE) 25 mg, oral, Daily   • torsemide (DEMADEX) 10-20 mg, oral, Daily, 10 mg if weight is 176-181 lbs, 20 mg if weight is >181 lbs       Scheduled Meds:  • cefepime  2 g intravenous q12h INT   • chlorhexidine  15 mL Mouth/Throat 2 times per day   • collagenase  1 application Topical Daily   • heparin (porcine)  5,000 Units subcutaneous q8h IVÁN   • metoprolol  2.5 mg intravenous q6h INT   • pantoprazole  40 mg intravenous q24h   • sodium hypochlorite   Topical Daily  "    Continuous Infusions:  • dexmedetomidine in 0.9 % NaCl  0.2-1.5 mcg/kg/hr 0.8 mcg/kg/hr (08/10/22 1059)   • insulin  0-15 Units/hr 0.3075 Units/hr (08/10/22 1028)   • norepinephrine  0.5-90 mcg/min 11 mcg/min (08/10/22 1006)     PRN Meds:.•  acetaminophen  •  glucose **OR** dextrose **OR** glucagon **OR** dextrose 50 % in water (D50)  •  insulin **AND** dextrose 50 % in water (D50)  •  HYDROmorphone      Intake/Output Summary (Last 24 hours) at 8/10/2022 1101  Last data filed at 8/10/2022 1000  Gross per 24 hour   Intake 3250.99 ml   Output 1965 ml   Net 1285.99 ml        Weights (last 7 days)     Date/Time Weight Drug Calculation Weight (Dosing Weight)    08/03/22 0944 80.3 kg (177 lb) --    08/03/22 0300 -- 80.7 kg (177 lb 14.6 oz)           Wt Readings from Last 3 Encounters:   08/03/22 80.3 kg (177 lb)   08/02/22 80.3 kg (177 lb)   07/25/22 81.1 kg (178 lb 11.2 oz)        REVIEW OF SYSTEMS:    A complete review of systems limited by sedation.    PHYSICAL EXAMINATION:  Visit Vitals  BP (!) 99/56   Pulse 65   Temp 37.1 °C (98.8 °F) (Bladder)   Resp (!) 24   Ht 1.854 m (6' 1\")   Wt 80.3 kg (177 lb)   SpO2 100%   BMI 23.35 kg/m²     Body surface area is 2.03 meters squared.  Body mass index is 23.35 kg/m².  General: intubated and sedated   HEENT: No corneal arcus or xanthelasmas.  Sclerae are anicteric.   Neck: CVC on the right. [id number]  Heart: s1s2 audible, no significant murmurs , irregular   Chest: Symmetrical.  Lungs: intubated. Breathing sounds audible bl .  Abdomen: soft, nt   Extremities: No cyanosis or clubbing.no lower extremity edema.  Distal pulses are easily palpable.  Skin: Warm and dry and well perfused.  Neurologic:  na  Psychiatric:na    Labs   Results from last 7 days   Lab Units 08/10/22  0631 08/09/22  1815 08/09/22  0414   SODIUM mEQ/L 154* 154* 149*   POTASSIUM mEQ/L 4.5 4.1 4.2   CHLORIDE mEQ/L 121* 123* 121*   CO2 mEQ/L 27 27 24   BUN mg/dL 54* 56* 59*   CREATININE mg/dL 0.9 1.0 1.0 "   CALCIUM mg/dL 8.3* 7.8* 7.8*   ALBUMIN g/dL 1.8* 1.8* 1.8*   BILIRUBIN TOTAL mg/dL 1.3* 1.0 1.3*   ALK PHOS IU/L 76 65 63   ALT IU/L 17 16 17   AST IU/L 41 33 31   GLUCOSE mg/dL 137* 160* 288*       No results found for: HSTROPONINI    Results from last 7 days   Lab Units 08/10/22  0631 08/09/22  0414 08/08/22  1138   WBC K/uL 19.34* 16.57* 16.06*   HEMOGLOBIN g/dL 12.6* 12.3* 12.2*   HEMATOCRIT % 39.3* 37.8* 38.4*   PLATELETS K/uL 89* 84* 101*       Lab Results   Component Value Date    CHOL 94 07/23/2022    HDL 23 (L) 07/23/2022    LDLCALC 63 07/23/2022    TRIG 41 07/23/2022    TSH 4.72 08/01/2022    HGBA1C 6.3 (H) 07/23/2022       Outside records reviewed..    Imaging  CXR reviewed     Cardiac Studies  TTE 04/13/2022 ( outside report , images not available)     •  A complete transthoracic echocardiogram (including 2D, color flow   Doppler, spectral Doppler and M-mode imaging) was performed using the   standard protocol. The study quality was adequate and poor.  •  The left ventricle is normal in size. Endocardium is incompletely   visualized and segmental wall motion abnormalities cannot be excluded.   Moderately decreased left ventricular ejection fraction. The left   ventricular ejection fraction by visual estimate is 35% to 40%.  •  There is indeterminate diastolic function.  •  The right ventricle is severely dilated. There is moderately to   severely decreased systolic function of the right ventricle.  •  The left atrium is moderately dilated.  •  The right atrium is severely dilated.  •  There is trivial mitral valve leaflet thickening. There is trace mitral   regurgitation. There is no mitral stenosis.  •  The aortic valve is trileaflet. There is mild aortic valve thickening.   There is mild aortic valve calcification. There is no aortic stenosis.   There is mild aortic regurgitation.  •  There is moderate to severe tricuspid regurgitation.  •  The inferior vena cava is dilated (diameter >21 mm) and  decreases <50%   in size with inspiration, suggesting an elevated right atrial pressure of   15 mmHg (range 10-20 mm Hg).  •  There is no prior study available for comparison at this organization.    Left Ventricle  The left ventricle is normal in size. Endocardium is incompletely visualized and segmental wall motion abnormalities cannot be excluded. The left ventricular ejection fraction by visual estimate is 35% to 40%. Moderately decreased left ventricular ejection fraction. There is indeterminate diastolic function.    Right Ventricle  The right ventricle is severely dilated. Off axis imaging of the right ventricle, but visually systolic function appears moderate to severely reduced.    Left Atrium  The left atrium is moderately dilated.    Right Atrium  The right atrium is severely dilated.    IVC/SVC  The inferior vena cava is dilated (diameter >21 mm) and decreases <50% in size with inspiration, suggesting an elevated right atrial pressure of 15 mmHg (range 10-20 mm Hg).    Mitral Valve  There is trivial mitral valve leaflet thickening. There is no mitral stenosis. There is trace mitral regurgitation.    Tricuspid Valve  There is moderate tricuspid annular dilatation. There is no tricuspid stenosis. There is moderate to severe tricuspid regurgitation.    Aortic Valve  The aortic valve is trileaflet. There is mild aortic valve thickening. There is mild aortic valve calcification. There is no aortic stenosis. There is mild aortic regurgitation.    Pulmonic Valve  There is no pulmonic stenosis. There is mild pulmonic valve regurgitation.    Pericardium  There are echocardiographic findings consistent with fat pad.    Aortic Arch/Descending/Abdominal Aorta  The aortic root is normal in size. The proximal segment of the ascending aorta is normal in size.     ECG   07/22/2022   Atrial fibrillation   rbbb   twi on precordial leads, present on ecg 07/2022     Telemetry  Atrial fibrillation , rbbb with ventricular  response in 70-80 range     Assessment:  This is a 89 y.o. male being consulted for cardiovascular management    #Justin's gangrene:   #Septic shock:   Management per primary service   On abx; id has been following   empagliflozin should be discontinued at the time of discharge. He was recently started on this agent in may 2022      Repeat BCx 08/09 ngtd    # iCM:  # HFrEF:  # PH on sildenafil as outpatient:    NYHA class III and he has ACC/AHA Stage C as outpatient previously       CVP14  On [id number] this am       His lvef appears 40-45% with severe RV dilation and diastolic dysfunction.     He is on increasing rate of norepinephrine     He has been improving overall from septic shock standpoint     Hold off on further diuresis   Increased fwf by primary team given hyponatremia ; consider D5 for the degree of hypernatremia   Strict intake and output   Continue holding entresto spironolactone and sildenafil while on vasopressor support and npo; pending further course   Please continue transducing cvp   Please continue to check MVO2 and lactate   We will be following along pending GOC discussion with family ; palliative care was consulted today     # Permanent atrial fibrillation:   [id number]     Rates in the 80-90    Not on AC as outpatient given history of GIB   He is on IV metoprolol 2.5 mg q6h    Case  discussed with Dr. Price. Final recommendations are pending attending co-signature. Please page Cardiology at [phone] with any questions.     Sabiha Cordon MD  8/10/2022

## 2022-08-10 NOTE — ACP (ADVANCE CARE PLANNING)
"Palliative Care Advance Care Planning Note      Patient Name: Samy Elena                                                                                        Patient MRN: [id number]  Discussion Date: 08/10/22   Discussion Participants: patient's son Alvin and myself    Start time: 1:45 PM   End time: 2:02 PM     Today participants wished to discuss Advance Care Planning. The following summarizes our discussion.    During our visit today, we discussed:     Health Care Agent/Surrogate Decision Maker   All 4 sons are making decisions together but son Marko has POA per Alvin.      Medical Status/Prognosis  Variable Dependent on Goals of Care     Alvin understands his father's situation. He and his brothers are looking for as much concrete, \"black-and-white\" information as possible to make their decision. He understands there is a lot of gray area. They know their father would not want to have a tracheostomy but aren't sure otherwise. Discussed that for some families they decide to extubate once it is as optimized as possible, with the hope that he does well but knowing if he declines then they would switch to comfort. Some families choose to wait until day 14 and then if their loved ones would not want a trach then they proceed with comfort care and palliative extubation at that time. We are hopeful to have more information about his trajectory by Friday.     Discussed that if he is able to be extubated before then, we would recommend he and his brothers discuss reintubation and if they would want that - if he fails extubation it seems likely he would end up with a tracheostomy, so it may make sense to decide not to reinubate if he is able to be extubated.     We will plan to discuss this all in more detail on Friday during the planned family meeting. If there are plans to extubate before Friday, would address these issues with family prior to extubation.      Patient Capacity  Patient does not have capacity to make " their own medical decisions.          Plan  -continue current level of care   -If there are plans to extubate before Friday, would address these issues with family prior to extubation.  -otherwise palliative care will plan to attend family meeting this Friday, please let us know once a time is planned.      Attestation Statement:  I have spent a total of 16 minutes in face-to-face discussion of patient advance care planning with the patient and/or surrogate decision makers.  No active management of the patient’s problem(s) was undertaken during the time period reported      Shital Mi MD  8/10/2022 2:25 PM

## 2022-08-10 NOTE — ASSESSMENT & PLAN NOTE
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

## 2022-08-10 NOTE — PROGRESS NOTES
Trauma surgeon on-call    Called emergently to bedside at approximately 5:30 AM for decreased expiratory volumes.  Endotracheal tube was manipulated prior to arrival with attempts to correct the decreased expiratory volumes without success.  Pulse ox 100%  Respiratory at bedside  Anesthesia called to bedside for possible reintubation as etiology unclear  Bronchoscopy performed, emergent, no consent obtained  Endotracheal tube adapter placed at the end of endotracheal tube, bronchoscope was then advanced down the endotracheal tube there was extensive amount of debris.  The tube was visualized within the airway.  It appeared to be in the right mainstem bronchus.  Due to the extensive debris I felt the best course would be for anesthesia to replace the tube.  Paralytics and sedation were administered.  The tube was exchanged over tube exchanger without incident.  Bronchoscopy was repeated.  There was extensive crust with secretions that was aspirated from the airway.  The airways were lavaged until clear.  Suspect that secretions were creating a one-way valve.  Follow-up chest x-ray with no pneumothorax.  Expiratory volumes were equal to inspiratory volumes.    I provided 30 minutes of critical care services to support this life/organ threatening illness. This time reflects daily cumulative, direct time spent in the care and direct coordination of care of the patient throughout the day by the attending physician, excluding separately billable services, procedures, teaching time and time spent by non-physician providers.

## 2022-08-10 NOTE — ANESTHESIA PROCEDURE NOTES
Airway  Urgency: emergent    Start Time: 8/10/2022 5:55 AM  Airway not difficult    General Information and Staff    Patient location during procedure: ICU  Resident/CRNA: Emilie Lennon CRNA    Consent for Airway (if performed for an anesthetic, see related documentation for consents)  Consent: No emergent situation. Verbal consent not obtained. Written consent not obtained.  Risks and benefits: risks, benefits and alternatives were not discussed      Indications and Patient Condition  Indications: in situ ETT dislodged.  Sedation level: deep (patient on IV sedation)  Preoxygenated: yes  MILS maintained throughout  Mask difficulty assessment: 0 - not attempted    Final Airway Details  Final airway type: endotracheal airway      Successful airway: ETT  Cuffed: yes   Successful intubation technique: exchange catheter  Endotracheal tube insertion site: oral  Blade size: #4  ETT size (mm): 8.0  Placement verified by: chest auscultation, bronchoscopy and capnometry   Measured from: teeth  ETT to teeth (cm): 23  Number of attempts at approach: 1  Number of other approaches attempted: 0  Atraumatic airway insertion      Additional Comments  Called to SICU bed 12 for potentially dislodged ETT.  O2 sats 100% with diminished B/L breath sounds.  Under direct visualization with glidescope the glottic opening could be seen with the  tip of the ETT right at the opening but not through the cords.  I was able to advance the ETT under glidescope visualization with MD Hickman confirming placement with bronch.  ICU team still not pleased with placement based upon bronch so the ETT was changed with an 11F cook airway cath.  8.0 ETT placed without issue.  CO2 confirmed and bronch completed.

## 2022-08-10 NOTE — PATIENT CARE CONFERENCE
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

## 2022-08-10 NOTE — CONSULTS
Wound Ostomy Continence Note    Subjective    HPI Patient is a 89 y.o. 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   • Necrotizing soft tissue infection   • Shock (CMS/HCC)   • Palliative care by specialist   • Debility      PMH/PSH Medical History:   Past Medical History:   Diagnosis Date   • Atrial fibrillation (CMS/HCC)    • Disease of thyroid gland    • GI (gastrointestinal bleed)    • Hypertension    • Myocardial infarction (CMS/HCC)      Surgical History:   Past Surgical History:   Procedure Laterality Date   • BYPASS GRAFT        Assessment and                   Recommendation C/s from nursing for pennis, sacrum wounds at new colostomy;   --partial thickness wound at penile, almost cicumference, 9x15cm, 100% pink tissue, more likely due to anasarca/edema, moderate serous exudate, no odor; adaptic in place;   --s/p surgical wound at buttock, perineum. Surgery is following, please defer to their local care, santyl and packing BID  --colostomy functioning with paste stool, sill follow for ostomy care when pt is able to learn   --on the vent, has perry, LOS 8 days, Rip score 10-13; pallative care is following for GOC, family meeting 08/12 possible; remains very high risk for PIs; PI prevent bundle in place     1. Consider xeroform for penile wound daily after cleansing with NSS or wound cleaser, cover with ABD Pad;   2. Maintain PI prevent bundle with low air loss TCS and air cushion, waffle boots ( in place)   3. Will follow penile wound and colostomy edcuation if GOC has no change     Plan of care discussed with nursing via phone call;                    Date: 08/10/22  Signature: [place]  RN

## 2022-08-10 NOTE — ASSESSMENT & PLAN NOTE
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 S/p OR and I+D, now intubated and sedated. Vent day #9. Palliative Care consulted for GOC.  -see ACP note for initial GOC with one of patient's sons - planning for family meeting tomorrow 8/12 at 1 PM - palliative will attend   - goals remain restorative at this time  - code status: FULL CODE  - pain appears to be well controled at this time , agree with weaning off fentanyl and continue with dilaudid 0.5mg every 3 hours as needed   - palliative care will continue to follow

## 2022-08-10 NOTE — CONSULTS
Palliative Care Consult      Patient Name: Samy Elena                                                                                        Patient MRN: [id number]  Date / Time Note Created: 8/10/2022 11:28 AM  Attending Physician: Mat Bryant MD  Referring Physician: No ref. provider found  Primary Care Physician: Zachery Hernandez MD   This is Hospital Day: 9    Reason for Consult:  Assistance with clarification of goals of care    HPI      Source: chart review    Samy Elena is an 89 y.o. male with PMH of Afib (not on AC due to GIB), hypertension, ischemic cardiomyopathy, HFrEF, pre-diabetes, who presented on 8/2 from outpatient Dr. 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    The pt underwent OR on 8/2. His course was complicated by hypotension requiring vasopressor support. He was unable to be weaned off the ventilator, but has been intermittently on PS.     This morning ET tube dislodged and was replaced. Today is vent day 9. He remaines on precedex and fentanyl as well as norepinephrine for pressure support. Primary team planning on weaning off sedation.          Chart Review:  The following portions of the patient’s history were reviewed and updated as appropriate:    Code Status History:  Current Code Status: Full Code  Prior to Consult: FULL CODE  Code status was changed during visit?  no    Past Family History  History reviewed. No pertinent family history.    Past Medical History  Past Medical History:   Diagnosis Date   • Atrial fibrillation (CMS/HCC)    • Disease of thyroid gland    • GI (gastrointestinal bleed)    • Hypertension    • Myocardial infarction (CMS/HCC)        Past Surgical History  Past Surgical History:   Procedure Laterality Date   • BYPASS GRAFT         Social History   Social History     Socioeconomic History   • Marital  status:      Spouse name: None   • Number of children: None   • Years of education: None   • Highest education level: None   Tobacco Use   • Smoking status: Never Smoker   • Smokeless tobacco: Never Used     Social Determinants of Health     Food Insecurity: No Food Insecurity   • Worried About Running Out of Food in the Last Year: Never true   • Ran Out of Food in the Last Year: Never true       Catholic:  P[employer]alism    Review of Systems: unable to obtain ROS since pt is intubated/sedated     Home Medications:  •  acetaminophen, Take by mouth.  •  bisacodyL, Insert into the rectum.  •  clopidogreL, Take 75 mg by mouth daily.  •  coenzyme Q10, Take 100 mg by mouth daily.  •  empagliflozin, Take 10 mg by mouth daily.  •  levothyroxine, Take 100 mcg by mouth daily.  •  metoprolol succinate XL, Take 100 mg by mouth daily.  •  multivitamin, Take by mouth daily.  •  ENTRESTO, Take 1 tablet by mouth 2 (two) times a day.  •  sildenafiL (pulm.hypertension), Take 20 mg by mouth 3 (three) times a day.  •  simvastatin, Take 10 mg by mouth nightly.  •  spironolactone, Take 25 mg by mouth daily.  •  torsemide, Take 10-20 mg by mouth daily. 10 mg if weight is 176-181 lbs, 20 mg if weight is >181 lbs    Current Medications:  •  acetaminophen, 650 mg, oral, q4h PRN  •  cefepime, 2 g, intravenous, q12h INT  •  chlorhexidine, 15 mL, Mouth/Throat, 2 times per day  •  collagenase, 1 application, Topical, Daily  •  dexmedetomidine in 0.9 % NaCl, 0.2-1.5 mcg/kg/hr, intravenous, Titrated  •  glucose, 16-32 g of dextrose, oral, PRN **OR** dextrose, 15-30 g of dextrose, oral, PRN **OR** glucagon, 1 mg, intramuscular, PRN **OR** dextrose 50 % in water (D50), 25 mL, intravenous, PRN  •  insulin, 0-15 Units/hr, intravenous, Titrated **AND** dextrose 50 % in water (D50), 10-30 mL, intravenous, PRN  •  heparin (porcine), 5,000 Units, subcutaneous, q8h IVÁN  •  HYDROmorphone, 0.5 mg, intravenous, q3h PRN  •  metoprolol, 2.5 mg, intravenous,  q6h INT  •  norepinephrine, 0.5-90 mcg/min, intravenous, Titrated  •  pantoprazole, 40 mg, intravenous, q24h  •  sodium hypochlorite, , Topical, Daily    Allergies:  Allergies   Allergen Reactions   • Jardiance [Empagliflozin] Other (see comments)     denise's gangrene       Capacity for Medical Decision Making: unreliable    Advance Directives:  Existence of Advance Directives:    Document Type(s): unclear  Healthcare Proxy: Marko mccracken  Emergency Contact: KassyMarko; Relationship: Son; Kassy,Watson; Relationship: Son; Kassy,Marsha; Relationship: Grandchild    Palliative Assessment    Current Palliative Performance Status: 10%  Baseline Palliative Performance Status: unkown  Preferred Setting for Care: home  Estimated Palliative Prognosis: months to years pending clinical interventions/ GOC  Patient Prognostic Awareness: unclear  Patient Status: Functional status: Bedridden.    Objective    Physical Exam:   Physical Exam  Constitutional:       Comments: Appears stated age  Sedated and intubated   HENT:      Head: Atraumatic.      Nose:      Comments: DHT in place     Mouth/Throat:      Comments: ETT in place  Eyes:      General: No scleral icterus.  Neck:      Comments: CVC in place  Pulmonary:      Comments: Intubated, Non labored breathing  Abdominal:      General: There is no distension.      Palpations: Abdomen is soft.      Comments: Colostomy bag in place   Genitourinary:     Comments: Will in place  Musculoskeletal:      Right lower leg: No edema.      Left lower leg: No edema.   Skin:     Comments: Perianal wound dressed, dressing c/d/i   Neurological:      Comments: Sedated and intubated         Vitals:  Vitals:    08/10/22 1045   BP: (!) 99/56   Pulse: 65   Resp:    Temp:    SpO2: 100%       Intake & Output:  I/O last 3 completed shifts:  In: 4948.8 [I.V.:1608.8; NG/GT:2790; IV Piggyback:550]  Out: 3295 [Urine:2720; Stool:575]    Laboratory Studies:  CBC Results       08/10/22 08/09/22  08/08/22     0631 0414 1138    WBC 19.34 16.57 16.06    RBC 3.57 3.59 3.60    HGB 12.6 12.3 12.2    HCT 39.3 37.8 38.4    .1 105.3 106.7    MCH 35.3 34.3 33.9    MCHC 32.1 32.5 31.8    PLT 89 84 101         Comment for PLT at 0631 on 08/10/22: CONSISTENT WITH PREVIOUS RESULTS    Comment for PLT at 1138 on 08/08/22: RESULTS CHECKED. RESULTS OBTAINED AFTER VORTEXING TO ELIMINATE PLT CLUMPS        CMP Results       08/10/22 08/09/22 08/09/22     0631 1815 0414     154 149    K 4.5 4.1 4.2    Cl 121 123 121    CO2 27 27 24    Glucose 137 160 288    BUN 54 56 59    Creatinine 0.9 1.0 1.0    Calcium 8.3 7.8 7.8    Anion Gap 6 4 4    AST 41 33 31    ALT 17 16 17    Albumin 1.8 1.8 1.8    EGFR >60.0 >60.0 >60.0        Troponin I Results    No lab values to display.       ABG Results       08/09/22 08/08/22 08/08/22     1219 1019 0514    pH 7.38 7.33 7.33    pCO2 42 34 47    pO2 175 184 174    HCO3 24.6 19.4 23.8    Base Excess -0.4 -7.1 -1.5    Source Of Oxygen mv prvc 40/6/20/450 PRVC 40%/6/20/450            Imaging and Other Studies:     X-RAY ABDOMEN 1 VIEW    Result Date: 8/10/2022  IMPRESSION: The patient is rotated. ET tube tip projects 4 cm above the pamella. PA catheter with tip overlying the right pulmonary artery. Right IJ approach central venous catheter with tip projecting at the level of the cavoatrial junction. NG/OG tube is seen coursing into the stomach. Sidehole likely in the region of the distal gastric body. The patient is status post CABG with postsurgical changes noted. Many of the sternotomy wires are fractured. Monitoring leads/wires overly the patient. There are small bibasilar pleural effusions with underlying atelectasis and mild central vascular congestion. COMMENT: Support lines, tubes, devices, and surgical hardware, material: See IMPRESSION Lungs and Pleura: See IMPRESSION. No pneumothorax. Cardiovascular and Mediastinum: The cardiomediastinal silhouette is stable noting  cardiomegaly and postoperative change. Other: Degenerative changes of the visualized spine. CLINICAL HISTORY:   tube reposition PROCEDURE: Single frontal view of the chest. COMPARISON:   8/3/2022     X-RAY ABDOMEN 1 VIEW    Result Date: 8/3/2022  IMPRESSION: Enteric catheter with its tip at the expected location of the GE junction. At the time of interpretation, a subsequent x-ray demonstrating adequate placement has been performed.    X-RAY ABDOMEN 1 VIEW    Result Date: 8/3/2022  IMPRESSION: An enteric catheter now courses below the diaphragm with its tip overlying the expected location of the stomach. The remainder of the findings in the lower chest and upper abdomen appear stable.    CT HEAD WITHOUT IV CONTRAST    Result Date: 7/22/2022  IMPRESSION: No evidence of an acute territorial infarct or intracranial hemorrhage.  Sequela chronic small vessel ischemic disease. COMMENT: Comparison:  None. TECHNIQUE: CT of the brain was performed and reconstructed/reformatted in the axial, coronal and sagittal planes. CT DOSE:  One or more dose reduction techniques (e.g. automated exposure control, adjustment of the mA and/or kV according to patient size, use of iterative reconstruction technique) utilized for this examination. INTRAVENOUS CONTRAST: None Brain parenchyma:  Age related involution and  ischemic small vessel changes. Gray-white matter differentiation is normal.  No evidence of intracranial hemorrhage, midline shift or other mass effect. Ventricles and cisterns:  Unchanged in size and configuration. Cranial carotid arteries: Mural calcification. Calvarium and extra cranial soft tissues:  No evidence of a displaced calvarial fracture.   Scalp soft tissue within normal limits. Visualized paranasal sinuses and mastoid air cells:  Mucosal thickening of the visualized paranasal sinuses.     MRI ANGIOGRAM HEAD WITHOUT CONTRAST    Result Date: 7/23/2022  IMPRESSION: 1. No acute intracranial abnormality, no acute  infarct. 2. Mild microangiopathic changes. 3. 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 4. Unremarkable MRA of the neck. COMMENT: Technique:  The brain was scanned in multiple planes with a variety of pulse sequences without contrast. Comparison:  Head CT dated July 22, 2022. Findings: Mild scattered periventricular and subcortical white matter T2 hyperintensities, nonspecific, likely sequela of microangiopathic disease. Sulci, ventricles and basal cisterns are within normal limits for patient's stated age. No mass-effect, intracranial hemorrhage, acute segmental infarct or extra-axial fluid collection is seen. Cerebellar tonsils are normal in position. Expected signal voids are seen in the intracranial vessels at the skull base. The orbits and sella are unremarkable.   The paranasal sinuses demonstrates circumferential mucosal thickening of the right maxillary sinus.  The mastoid air cells are clear. MR angiography head and neck without contrast Technique: An MRA of the [place] was performed utilizing a 3D time-of-flight technique. MRA of the neck was also performed utilizing 2-D and 3-D time-of-flight technique. No prior studies are available for comparison. 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 Left vertebral artery is dominant. The right vertebral artery ends in PICA. Near fetal origin left PCA. No medium or large size aneurysms are seen in the [place]. Please note that MRA may be insensitive in the detection of aneurysms smaller than 4 mm. MRA of the neck demonstrates patency of the common carotid arteries, internal carotid arteries and vertebral arteries bilaterally. No evidence for vessel dissection, cutoff, hemodynamically significant stenoses by NASCET criteria.    MRI  ANGIOGRAM NECK WITHOUT CONTRAST    Result Date: 7/23/2022  IMPRESSION: 1. No acute intracranial abnormality, no acute infarct. 2. Mild microangiopathic changes. 3. 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 4. Unremarkable MRA of the neck. COMMENT: Technique:  The brain was scanned in multiple planes with a variety of pulse sequences without contrast. Comparison:  Head CT dated July 22, 2022. Findings: Mild scattered periventricular and subcortical white matter T2 hyperintensities, nonspecific, likely sequela of microangiopathic disease. Sulci, ventricles and basal cisterns are within normal limits for patient's stated age. No mass-effect, intracranial hemorrhage, acute segmental infarct or extra-axial fluid collection is seen. Cerebellar tonsils are normal in position. Expected signal voids are seen in the intracranial vessels at the skull base. The orbits and sella are unremarkable.   The paranasal sinuses demonstrates circumferential mucosal thickening of the right maxillary sinus.  The mastoid air cells are clear. MR angiography head and neck without contrast Technique: An MRA of the [place] was performed utilizing a 3D time-of-flight technique. MRA of the neck was also performed utilizing 2-D and 3-D time-of-flight technique. No prior studies are available for comparison. 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 Left vertebral artery is dominant. The right vertebral artery ends in PICA. Near fetal origin left PCA. No medium or large size aneurysms are seen in the [place]. Please note that MRA may be insensitive in the detection of aneurysms smaller than 4 mm. MRA of the neck demonstrates patency of the common carotid arteries, internal carotid arteries and vertebral arteries  bilaterally. No evidence for vessel dissection, cutoff, hemodynamically significant stenoses by NASCET criteria.    MRI BRAIN WITHOUT CONTRAST    Result Date: 7/23/2022  IMPRESSION: 1. No acute intracranial abnormality, no acute infarct. 2. Mild microangiopathic changes. 3. 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 4. Unremarkable MRA of the neck. COMMENT: Technique:  The brain was scanned in multiple planes with a variety of pulse sequences without contrast. Comparison:  Head CT dated July 22, 2022. Findings: Mild scattered periventricular and subcortical white matter T2 hyperintensities, nonspecific, likely sequela of microangiopathic disease. Sulci, ventricles and basal cisterns are within normal limits for patient's stated age. No mass-effect, intracranial hemorrhage, acute segmental infarct or extra-axial fluid collection is seen. Cerebellar tonsils are normal in position. Expected signal voids are seen in the intracranial vessels at the skull base. The orbits and sella are unremarkable.   The paranasal sinuses demonstrates circumferential mucosal thickening of the right maxillary sinus.  The mastoid air cells are clear. MR angiography head and neck without contrast Technique: An MRA of the [place] was performed utilizing a 3D time-of-flight technique. MRA of the neck was also performed utilizing 2-D and 3-D time-of-flight technique. No prior studies are available for comparison. 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 Left vertebral artery is dominant. The right vertebral artery ends in PICA. Near fetal origin left PCA. No medium or large size aneurysms are seen in the [place]. Please note that MRA may be insensitive in the detection of aneurysms smaller than 4 mm. MRA of the neck  demonstrates patency of the common carotid arteries, internal carotid arteries and vertebral arteries bilaterally. No evidence for vessel dissection, cutoff, hemodynamically significant stenoses by NASCET criteria.    X-RAY CHEST 1 VIEW    Result Date: 8/10/2022  IMPRESSION: The patient is rotated. ET tube tip projects 4 cm above the pamella. PA catheter with tip overlying the right pulmonary artery. Right IJ approach central venous catheter with tip projecting at the level of the cavoatrial junction. NG/OG tube is seen coursing into the stomach. Sidehole likely in the region of the distal gastric body. The patient is status post CABG with postsurgical changes noted. Many of the sternotomy wires are fractured. Monitoring leads/wires overly the patient. There are small bibasilar pleural effusions with underlying atelectasis and mild central vascular congestion. COMMENT: Support lines, tubes, devices, and surgical hardware, material: See IMPRESSION Lungs and Pleura: See IMPRESSION. No pneumothorax. Cardiovascular and Mediastinum: The cardiomediastinal silhouette is stable noting cardiomegaly and postoperative change. Other: Degenerative changes of the visualized spine. CLINICAL HISTORY:   tube reposition PROCEDURE: Single frontal view of the chest. COMPARISON:   8/3/2022     X-RAY CHEST 1 VIEW    Result Date: 8/10/2022  IMPRESSION: Endotracheal tube with tip approximately 3 cm in the level of the pamella. Additional tubes and lines as described. Suggestion of layering right greater than left pleural effusions with associated bibasilar opacities, likely similar compared to earlier study of the same day.    X-RAY CHEST 1 VIEW    Result Date: 8/9/2022  IMPRESSION: No significant change since 8/8/2022.    X-RAY CHEST 1 VIEW    Result Date: 8/8/2022  IMPRESSION: Stable chest.     X-RAY CHEST 1 VIEW    Result Date: 8/8/2022  IMPRESSION: Stable chest.     X-RAY CHEST 1 VIEW    Result Date: 8/7/2022  IMPRESSION: Small right  greater than left pleural effusions with underlying atelectasis. Support devices in place as described below. COMMENT: Support lines, tubes, devices, and surgical hardware, material: Endotracheal tube tip 3.37 m above the pamella. NG/OG tube is seen coursing into the stomach. Left IJ approach PA catheter with tip overlying the right pulmonary artery. Right IJ approach central venous catheter with tip projecting at the level of the upper right atrium. Sternal wires and mediastinal clips typical prior CABG, with several fractured sternal wires. Monitoring leads overlie the patient. Lungs and Pleura: See Impression. No pneumothorax. Cardiovascular and Mediastinum: The cardiomediastinal silhouette is stable noting postoperative change. Other: Osseous structures appear unchanged. CLINICAL HISTORY:   intubated PROCEDURE: Single frontal view of the chest. COMPARISON:   Comparison is made to exam on the prior day     X-RAY CHEST 1 VIEW    Result Date: 8/6/2022  IMPRESSION: Bilateral small effusions COMMENT: Two AP radiographs the chest reveal cardiomegaly.  There are bilateral effusions.  Endotracheal tube, left-sided central venous catheter, right-sided [place] catheter and nasogastric tube are in appropriate position.  Sternal sutures are seen.  Surgical staples overlie the cardiac silhouette.  Comparison is made to previous study dated 8/5/2022 and has been no significant interval change.    X-RAY CHEST 1 VIEW    Result Date: 8/5/2022  IMPRESSION: 1. [place] catheter placement. Tip projecting over the right pulmonary artery laterally. No pneumothorax. 2. Otherwise little change    X-RAY CHEST 1 VIEW    Result Date: 8/4/2022  IMPRESSION: Stable chest    X-RAY CHEST 1 VIEW    Result Date: 8/3/2022  IMPRESSION: See above.    X-RAY CHEST 1 VIEW    Result Date: 8/3/2022  IMPRESSION: See above.    X-RAY CHEST 1 VIEW    Result Date: 7/23/2022  IMPRESSION: Findings suspicious for congestive heart failure. COMMENT: AP  radiograph of the chest was obtained.  We have no prior similar studies for comparison.  There is cardiomegaly.  There is vascular congestion/interstitial edema.  There are bilateral effusions.  Findings suggest congestive heart failure.  Sternal sutures are seen.  There is no pneumothorax. We have no prior similar studies for comparison.    Ultrasound venous leg right    Result Date: 7/23/2022  IMPRESSION: No evidence for right -sided deep venous thrombosis in the visualized veins.                                                                   Cardiac Imaging    TRANSTHORACIC ECHO (TTE) LIMITED 08/03/2022    Interpretation Summary  · Normal-sized LV. Mildly decreased LV systolic function. Estimated EF 40- 45%. Markedly abnormal LV septal wall motion. Normal LV wall thickness. Unable to assess diastolic filling pattern of the LV.  · Severely dilated RV. Severely reduced RV systolic function.  · Mildly dilated LA.  · Severely dilated RA. Catheter present in the RA.  · Aorta not well visualized. Aortic root sclerotic.  · Sclerotic aortic valve leaflets. Trace aortic valve regurgitation.  · Sclerotic mitral valve. Trace mitral valve regurgitation.  · Pulmonic valve not well visualized.  · Dilated tricuspid annulus with leaflet failure to coapt. Large gap visualized, approximately 1 cm. Severe tricuspid valve regurgitation. Vena contracta 1.0cm. Unable to estimate RVSP in the setting of severe tricuspid regurgitation and single-chamber physiology. Reversed hepatic vein systolic flow.  · IVC is dilated (>2.1cm). IVC collapses <50% during inspiration.  · No evidence of pericardial effusion.  · No prior study available for comparison.       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 S/p OR and I+D, now intubated and sedated. Vent day #9. Palliative Care consulted for  GOC.    - awaiting family meeting to discuss tracheostomy. Unclear if this aligns with pt's goals. 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  Final recommendations pending attending attestation.       Yamilet Blair MD  Office [phone]

## 2022-08-11 ENCOUNTER — APPOINTMENT (INPATIENT)
Dept: RADIOLOGY | Facility: HOSPITAL | Age: 87
DRG: 717 | End: 2022-08-11
Attending: STUDENT IN AN ORGANIZED HEALTH CARE EDUCATION/TRAINING PROGRAM
Payer: MEDICARE

## 2022-08-11 LAB
ALBUMIN SERPL-MCNC: 1.7 G/DL (ref 3.4–5)
ALP SERPL-CCNC: 73 IU/L (ref 35–126)
ALT SERPL-CCNC: 17 IU/L (ref 16–63)
ANION GAP SERPL CALC-SCNC: 3 MEQ/L (ref 3–15)
ANION GAP SERPL CALC-SCNC: 4 MEQ/L (ref 3–15)
AST SERPL-CCNC: 44 IU/L (ref 15–41)
BASE EXCESS BLDA CALC-SCNC: 0.3 MEQ/L
BASE EXCESS BLDA CALC-SCNC: 0.9 MEQ/L
BILIRUB SERPL-MCNC: 1.2 MG/DL (ref 0.3–1.2)
BUN SERPL-MCNC: 42 MG/DL (ref 8–20)
BUN SERPL-MCNC: 47 MG/DL (ref 8–20)
CA-I BLD-SCNC: 1.16 MMOL/L (ref 1.15–1.27)
CA-I BLD-SCNC: 1.2 MMOL/L (ref 1.15–1.27)
CA-I BLD-SCNC: 1.22 MMOL/L (ref 1.15–1.27)
CALCIUM SERPL-MCNC: 8 MG/DL (ref 8.9–10.3)
CALCIUM SERPL-MCNC: 8.1 MG/DL (ref 8.9–10.3)
CHLORIDE BLDA-SCNC: 122 MEQ/L (ref 98–109)
CHLORIDE BLDA-SCNC: 122 MEQ/L (ref 98–109)
CHLORIDE SERPL-SCNC: 122 MEQ/L (ref 98–109)
CHLORIDE SERPL-SCNC: 123 MEQ/L (ref 98–109)
CO2 BLDA-SCNC: 25 MEQ/L (ref 22–32)
CO2 BLDA-SCNC: 25.8 MEQ/L (ref 22–32)
CO2 SERPL-SCNC: 26 MEQ/L (ref 22–32)
CO2 SERPL-SCNC: 27 MEQ/L (ref 22–32)
COHGB MFR BLD: 1.5 %
COHGB MFR BLD: ABNORMAL %
CREAT SERPL-MCNC: 0.8 MG/DL (ref 0.8–1.3)
CREAT SERPL-MCNC: 0.9 MG/DL (ref 0.8–1.3)
ERYTHROCYTE [DISTWIDTH] IN BLOOD BY AUTOMATED COUNT: 15.2 % (ref 11.6–14.4)
FIO2 ON VENT: 40 %
FIO2 ON VENT: 40 %
GFR SERPL CREATININE-BSD FRML MDRD: >60 ML/MIN/1.73M*2
GFR SERPL CREATININE-BSD FRML MDRD: >60 ML/MIN/1.73M*2
GLUCOSE BLD-MCNC: 125 MG/DL (ref 70–99)
GLUCOSE BLD-MCNC: 134 MG/DL (ref 70–99)
GLUCOSE BLD-MCNC: 141 MG/DL (ref 70–99)
GLUCOSE BLD-MCNC: 145 MG/DL (ref 70–99)
GLUCOSE BLD-MCNC: 146 MG/DL (ref 70–99)
GLUCOSE BLD-MCNC: 151 MG/DL (ref 70–99)
GLUCOSE BLD-MCNC: 153 MG/DL (ref 70–99)
GLUCOSE BLD-MCNC: 155 MG/DL (ref 70–99)
GLUCOSE BLD-MCNC: 155 MG/DL (ref 70–99)
GLUCOSE BLD-MCNC: 165 MG/DL (ref 70–99)
GLUCOSE BLD-MCNC: 168 MG/DL (ref 70–99)
GLUCOSE BLD-MCNC: 173 MG/DL (ref 70–99)
GLUCOSE BLD-MCNC: 177 MG/DL (ref 70–99)
GLUCOSE BLD-MCNC: 180 MG/DL (ref 70–99)
GLUCOSE BLD-MCNC: 188 MG/DL (ref 70–99)
GLUCOSE BLDA-MCNC: 182 MG/DL (ref 70–99)
GLUCOSE BLDA-MCNC: 207 MG/DL (ref 70–99)
GLUCOSE SERPL-MCNC: 168 MG/DL (ref 70–99)
GLUCOSE SERPL-MCNC: 200 MG/DL (ref 70–99)
HCO3 BLDA-SCNC: 25.2 MEQ/L (ref 21–28)
HCO3 BLDA-SCNC: 25.6 MEQ/L (ref 21–28)
HCT VFR BLDCO AUTO: 36.7 % (ref 40.1–51)
HGB BLD-MCNC: 11.7 G/DL (ref 13.7–17.5)
HGB BLDA OXIMETRY-MCNC: 11.4 G/DL (ref 14–17.5)
HGB BLDA OXIMETRY-MCNC: ABNORMAL G/DL
INHALED O2 CONCENTRATION: ABNORMAL %
INHALED O2 CONCENTRATION: ABNORMAL %
INR PPP: 1.5
LACTATE BLDA-SCNC: 0.8 MMOL/L (ref 0.4–1.6)
LACTATE BLDA-SCNC: 1.4 MMOL/L (ref 0.4–1.6)
MAGNESIUM SERPL-MCNC: 2.2 MG/DL (ref 1.8–2.5)
MCH RBC QN AUTO: 33.9 PG (ref 28–33.2)
MCHC RBC AUTO-ENTMCNC: 31.9 G/DL (ref 32.2–36.5)
MCV RBC AUTO: 106.4 FL (ref 83–98)
METHGB BLD-SCNC: 0.6 % (ref 0.4–1.5)
METHGB BLD-SCNC: ABNORMAL MMOL/L
PCO2 BLDA: 33 MM HG (ref 35–48)
PCO2 BLDA: 38 MM HG (ref 35–48)
PDW BLD AUTO: 11.5 FL (ref 9.4–12.4)
PH BLDA: 7.42 [PH] (ref 7.35–7.45)
PH BLDA: 7.47 [PH] (ref 7.35–7.45)
PHOSPHATE SERPL-MCNC: 1.9 MG/DL (ref 2.4–4.7)
PLATELET # BLD AUTO: 98 K/UL (ref 150–350)
PO2 BLDA: 149 MM HG (ref 83–100)
PO2 BLDA: 263 MM HG (ref 83–100)
POCT TEST: ABNORMAL
POTASSIUM BLDA-SCNC: 4.1 MEQ/L (ref 3.4–4.5)
POTASSIUM BLDA-SCNC: 4.4 MEQ/L (ref 3.4–4.5)
POTASSIUM SERPL-SCNC: 4.1 MEQ/L (ref 3.6–5.1)
POTASSIUM SERPL-SCNC: 4.2 MEQ/L (ref 3.6–5.1)
PROT SERPL-MCNC: 5.2 G/DL (ref 6–8.2)
PROTHROMBIN TIME: 17.7 SEC (ref 12.2–14.5)
RBC # BLD AUTO: 3.45 M/UL (ref 4.5–5.8)
SAO2 % BLDA: 97 % (ref 93–98)
SAO2 % BLDA: ABNORMAL %
SODIUM BLDA-SCNC: 148 MEQ/L (ref 136–145)
SODIUM BLDA-SCNC: 151 MEQ/L (ref 136–145)
SODIUM SERPL-SCNC: 151 MEQ/L (ref 136–144)
SODIUM SERPL-SCNC: 154 MEQ/L (ref 136–144)
WBC # BLD AUTO: 16.52 K/UL (ref 3.8–10.5)

## 2022-08-11 PROCEDURE — 94003 VENT MGMT INPAT SUBQ DAY: CPT

## 2022-08-11 PROCEDURE — 85610 PROTHROMBIN TIME: CPT | Performed by: SURGERY

## 2022-08-11 PROCEDURE — 25800000 HC PHARMACY IV SOLUTIONS: Performed by: STUDENT IN AN ORGANIZED HEALTH CARE EDUCATION/TRAINING PROGRAM

## 2022-08-11 PROCEDURE — 71045 X-RAY EXAM CHEST 1 VIEW: CPT

## 2022-08-11 PROCEDURE — 99233 SBSQ HOSP IP/OBS HIGH 50: CPT | Performed by: INTERNAL MEDICINE

## 2022-08-11 PROCEDURE — 80053 COMPREHEN METABOLIC PANEL: CPT | Performed by: STUDENT IN AN ORGANIZED HEALTH CARE EDUCATION/TRAINING PROGRAM

## 2022-08-11 PROCEDURE — 80048 BASIC METABOLIC PNL TOTAL CA: CPT | Performed by: STUDENT IN AN ORGANIZED HEALTH CARE EDUCATION/TRAINING PROGRAM

## 2022-08-11 PROCEDURE — 36573 INSJ PICC RS&I 5 YR+: CPT

## 2022-08-11 PROCEDURE — 84100 ASSAY OF PHOSPHORUS: CPT | Performed by: SURGERY

## 2022-08-11 PROCEDURE — 82330 ASSAY OF CALCIUM: CPT | Performed by: SURGERY

## 2022-08-11 PROCEDURE — 85027 COMPLETE CBC AUTOMATED: CPT | Performed by: SURGERY

## 2022-08-11 PROCEDURE — 83735 ASSAY OF MAGNESIUM: CPT | Performed by: SURGERY

## 2022-08-11 PROCEDURE — 25000000 HC PHARMACY GENERAL: Performed by: SURGERY

## 2022-08-11 PROCEDURE — 36415 COLL VENOUS BLD VENIPUNCTURE: CPT | Performed by: SURGERY

## 2022-08-11 PROCEDURE — 25000000 HC PHARMACY GENERAL: Performed by: STUDENT IN AN ORGANIZED HEALTH CARE EDUCATION/TRAINING PROGRAM

## 2022-08-11 PROCEDURE — 63600000 HC DRUGS/DETAIL CODE: Performed by: SURGERY

## 2022-08-11 PROCEDURE — 85018 HEMOGLOBIN: CPT | Performed by: STUDENT IN AN ORGANIZED HEALTH CARE EDUCATION/TRAINING PROGRAM

## 2022-08-11 PROCEDURE — 63600000 HC DRUGS/DETAIL CODE: Performed by: STUDENT IN AN ORGANIZED HEALTH CARE EDUCATION/TRAINING PROGRAM

## 2022-08-11 PROCEDURE — 20000000 HC ROOM AND CARE ICU

## 2022-08-11 PROCEDURE — 63700000 HC SELF-ADMINISTRABLE DRUG: Performed by: SURGERY

## 2022-08-11 RX ORDER — HYDROMORPHONE HYDROCHLORIDE 1 MG/ML
0.25 INJECTION, SOLUTION INTRAMUSCULAR; INTRAVENOUS; SUBCUTANEOUS
Status: DISCONTINUED | OUTPATIENT
Start: 2022-08-11 | End: 2022-08-23 | Stop reason: HOSPADM

## 2022-08-11 RX ADMIN — CHLORHEXIDINE GLUCONATE 15 ML: 1.2 SOLUTION ORAL at 22:05

## 2022-08-11 RX ADMIN — METOPROLOL TARTRATE 2.5 MG: 5 INJECTION, SOLUTION INTRAVENOUS at 19:50

## 2022-08-11 RX ADMIN — PANTOPRAZOLE SODIUM 40 MG: 40 INJECTION, POWDER, FOR SOLUTION INTRAVENOUS at 08:45

## 2022-08-11 RX ADMIN — DEXMEDETOMIDINE 0.7 MCG/KG/HR: 200 INJECTION, SOLUTION INTRAVENOUS at 10:05

## 2022-08-11 RX ADMIN — NOREPINEPHRINE BITARTRATE 6 MCG/MIN: 1 INJECTION, SOLUTION, CONCENTRATE INTRAVENOUS at 02:19

## 2022-08-11 RX ADMIN — CEFEPIME 2 G: 2 INJECTION, POWDER, FOR SOLUTION INTRAVENOUS at 00:15

## 2022-08-11 RX ADMIN — HEPARIN SODIUM 5000 UNITS: 5000 INJECTION, SOLUTION INTRAVENOUS; SUBCUTANEOUS at 14:27

## 2022-08-11 RX ADMIN — HEPARIN SODIUM 5000 UNITS: 5000 INJECTION, SOLUTION INTRAVENOUS; SUBCUTANEOUS at 22:00

## 2022-08-11 RX ADMIN — SODIUM CHLORIDE 2.26 UNITS/HR: 900 INJECTION INTRAVENOUS at 23:13

## 2022-08-11 RX ADMIN — HYDROMORPHONE HYDROCHLORIDE 0.5 MG: 1 INJECTION, SOLUTION INTRAMUSCULAR; INTRAVENOUS; SUBCUTANEOUS at 07:22

## 2022-08-11 RX ADMIN — CEFEPIME 2 G: 2 INJECTION, POWDER, FOR SOLUTION INTRAVENOUS at 23:02

## 2022-08-11 RX ADMIN — DEXMEDETOMIDINE 0.4 MCG/KG/HR: 200 INJECTION, SOLUTION INTRAVENOUS at 23:13

## 2022-08-11 RX ADMIN — NOREPINEPHRINE BITARTRATE 10 MCG/MIN: 1 INJECTION, SOLUTION, CONCENTRATE INTRAVENOUS at 10:59

## 2022-08-11 RX ADMIN — HYDROMORPHONE HYDROCHLORIDE 0.5 MG: 1 INJECTION, SOLUTION INTRAMUSCULAR; INTRAVENOUS; SUBCUTANEOUS at 04:07

## 2022-08-11 RX ADMIN — DAKIN'S SOLUTION 0.125% (QUARTER STRENGTH): 0.12 SOLUTION at 08:46

## 2022-08-11 RX ADMIN — METOPROLOL TARTRATE 2.5 MG: 5 INJECTION, SOLUTION INTRAVENOUS at 06:07

## 2022-08-11 RX ADMIN — POTASSIUM PHOSPHATE, MONOBASIC POTASSIUM PHOSPHATE, DIBASIC 20 MMOL: 224; 236 INJECTION, SOLUTION, CONCENTRATE INTRAVENOUS at 12:05

## 2022-08-11 RX ADMIN — CHLORHEXIDINE GLUCONATE 15 ML: 1.2 SOLUTION ORAL at 10:04

## 2022-08-11 RX ADMIN — COLLAGENASE SANTYL 1 APPLICATION.: 250 OINTMENT TOPICAL at 08:46

## 2022-08-11 RX ADMIN — CEFEPIME 2 G: 2 INJECTION, POWDER, FOR SOLUTION INTRAVENOUS at 11:29

## 2022-08-11 RX ADMIN — METOPROLOL TARTRATE 2.5 MG: 5 INJECTION, SOLUTION INTRAVENOUS at 14:40

## 2022-08-11 RX ADMIN — METOPROLOL TARTRATE 2.5 MG: 5 INJECTION, SOLUTION INTRAVENOUS at 00:13

## 2022-08-11 RX ADMIN — HEPARIN SODIUM 5000 UNITS: 5000 INJECTION, SOLUTION INTRAVENOUS; SUBCUTANEOUS at 06:06

## 2022-08-11 RX ADMIN — DEXMEDETOMIDINE 0.5 MCG/KG/HR: 200 INJECTION, SOLUTION INTRAVENOUS at 02:20

## 2022-08-11 NOTE — PROGRESS NOTES
SURGICAL CRITICAL CARE DAILY PROGRESS NOTE     PATIENT NAME:  aSmy Elena YOB: 1930    AGE:  89 y.o.  GENDER: male   MRN:  [id number]  PATIENT #: [id number]     SUBJECTIVE   Yesterday [place] catheter was removed.  Free water flushes increased to 75 every 2 hours.  Fentanyl drip was turned off.  Remains on insulin drip.    No acute acute events overnight. remains intubated in ICU. Alert and nods appropriately to questions. Tolerating tube feeds at goal.    Currently requiring Levo of 5.  PRVC 40%/450/20/6 overnight.  Tolerated SBT for 12 hours yesterday.    ID following. On Cefepime 2g Q12.  Today is day 9 of 14 planned.    Will discuss exchanging central line for PICC line.  We will continue to wean ventilation.  Goals of care meeting planned with his 4 sons on Friday.    VITAL SIGNS     Temperature:   No data recorded.       Last 24 hours:    Heart Rate:  [] 66  Resp:  [18-28] 28  BP: ()/(39-98) 117/63  FiO2 (%) (Set):  [40 %] 40 %    VENT SETTINGS:   Vent Mode: Pressure support  FiO2 (%) (Set):  [40 %] 40 %  S RR:  [20] 20  S VT:  [500 mL] 500 mL  PEEP/CPAP (Set, cmH2O):  [6 cm H20] 6 cm H20  MAP (Observed, cmH2O):  [6-9] 9    INTAKE & OUTPUT     I/O last 3 completed shifts:  In: 4794.9 [I.V.:1691.9; NG/GT:2903; IV Piggyback:200]  Out: 2680 [Urine:2280; Stool:400]    Intake/Output Summary (Last 24 hours) at 8/11/2022 0906  Last data filed at 8/11/2022 0800  Gross per 24 hour   Intake 3317.92 ml   Output 1815 ml   Net 1502.92 ml     I/O this shift:  In: 583.4 [I.V.:182.4; NG/GT:401]  Out: 200 [Urine:200]     MEDICATIONS     Infusions:    • dexmedetomidine in 0.9 % NaCl  0.2-1.5 mcg/kg/hr 0.7 mcg/kg/hr (08/11/22 0800)   • insulin  0-15 Units/hr 1.48 Units/hr (08/11/22 0854)   • norepinephrine  0.5-90 mcg/min 10 mcg/min (08/11/22 0849)            Scheduled:   • cefepime  2 g intravenous q12h INT   • chlorhexidine  15 mL Mouth/Throat 2 times per day   • heparin (porcine)  5,000  Units subcutaneous q8h IVÁN   • metoprolol  2.5 mg intravenous q6h INT   • pantoprazole  40 mg intravenous q24h   • sodium hypochlorite   Topical Daily       Antibiotics:  Anti-infectives (From admission, onward)    Start     Dose/Rate Route Frequency Ordered Stop    08/05/22 1200  ceFEPIme (MAXIPIME) 2 g in 100 mL NSS vial in bag         2 g  200 mL/hr over 30 Minutes intravenous Every 12 hours interval 08/05/22 1110            PRN:     acetaminophen, 650 mg, q4h PRN  glucose, 16-32 g of dextrose, PRN   Or  dextrose, 15-30 g of dextrose, PRN   Or  glucagon, 1 mg, PRN   Or  dextrose 50 % in water (D50), 25 mL, PRN  dextrose 50 % in water (D50), 10-30 mL, PRN  HYDROmorphone, 0.5 mg, q3h PRN         DIAGNOSTIC DATA     LABS:  Recent Results (from the past 24 hour(s))   POCT Glucose    Collection Time: 08/10/22 10:13 AM   Result Value Ref Range    POCT Bedside Glucose 101 (H) 70 - 99 mg/dL    POC Test POC    POCT Glucose    Collection Time: 08/10/22 11:08 AM   Result Value Ref Range    POCT Bedside Glucose 114 (H) 70 - 99 mg/dL    POC Test POC    Blood Gas, arterial Comprehensive    Collection Time: 08/10/22 11:11 AM   Result Value Ref Range    pH, Arterial 7.38 7.35 - 7.45    pCO2, Arterial 44 35 - 48 mm Hg    pO2, Arterial 158 (H) 83 - 100 mm Hg    HCO3, Arterial 25.3 21.0 - 28.0 mEQ/L    Base Excess, Arterial 0.5 mEQ/L    O2 Sat, Arterial 97 93 - 98 %    TCO2, Arterial 27.4 22.0 - 32.0 mEQ/L    Lactate, Art 0.8 0.4 - 1.6 mmol/L    Glucose, Arterial 138 (H) 70 - 99 mg/dL    Sodium, Arterial 154 (H) 136 - 145 mEQ/L    Potassium, Arterial 4.6 (H) 3.4 - 4.5 mEQ/L    Chloride, Arterial 124 (H) 98 - 109 mEQ/L    Ionized Calcium, Arterial 1.22 1.15 - 1.27 mmol/L    Hemoglobin, Arterial 12.0 (L) 14.0 - 17.5 g/dL    Carboxyhemoglobin 2.2 0.0 - 3.0; (Smokers: 0.0 - 9.0) %    Methemoglobin 0.4 0.4 - 1.5 %    Source Of Oxygen ps 8/6     FIO2 40    POCT Glucose    Collection Time: 08/10/22 11:57 AM   Result Value Ref Range     POCT Bedside Glucose 93 70 - 99 mg/dL    POC Test POC    POCT Glucose    Collection Time: 08/10/22 12:55 PM   Result Value Ref Range    POCT Bedside Glucose 120 (H) 70 - 99 mg/dL    POC Test POC    Basic metabolic panel    Collection Time: 08/10/22  2:01 PM   Result Value Ref Range    Sodium 153 (H) 136 - 144 mEQ/L    Potassium 4.7 3.6 - 5.1 mEQ/L    Chloride 125 (H) 98 - 109 mEQ/L    CO2 21 (L) 22 - 32 mEQ/L    BUN 53 (H) 8 - 20 mg/dL    Creatinine 0.9 0.8 - 1.3 mg/dL    Glucose 221 (H) 70 - 99 mg/dL    Calcium 8.0 (L) 8.9 - 10.3 mg/dL    eGFR >60.0 >=60.0 mL/min/1.73m*2    Anion Gap 7 3 - 15 mEQ/L   POCT Glucose    Collection Time: 08/10/22  2:04 PM   Result Value Ref Range    POCT Bedside Glucose 178 (H) 70 - 99 mg/dL    POC Test POC    POCT Glucose    Collection Time: 08/10/22  3:14 PM   Result Value Ref Range    POCT Bedside Glucose 182 (H) 70 - 99 mg/dL    POC Test POC    POCT Glucose    Collection Time: 08/10/22  3:53 PM   Result Value Ref Range    POCT Bedside Glucose 169 (H) 70 - 99 mg/dL    POC Test POC    POCT Glucose    Collection Time: 08/10/22  5:00 PM   Result Value Ref Range    POCT Bedside Glucose 204 (H) 70 - 99 mg/dL    POC Test POC    POCT Glucose    Collection Time: 08/10/22  6:04 PM   Result Value Ref Range    POCT Bedside Glucose 234 (H) 70 - 99 mg/dL    POC Test POC    POCT Glucose    Collection Time: 08/10/22  6:52 PM   Result Value Ref Range    POCT Bedside Glucose 173 (H) 70 - 99 mg/dL    POC Test POC    POCT Glucose    Collection Time: 08/10/22  8:04 PM   Result Value Ref Range    POCT Bedside Glucose 192 (H) 70 - 99 mg/dL    POC Test POC    POCT Glucose    Collection Time: 08/10/22  8:52 PM   Result Value Ref Range    POCT Bedside Glucose 163 (H) 70 - 99 mg/dL    POC Test POC    POCT Glucose    Collection Time: 08/10/22 10:04 PM   Result Value Ref Range    POCT Bedside Glucose 186 (H) 70 - 99 mg/dL    POC Test POC    Basic metabolic panel    Collection Time: 08/10/22 10:07 PM   Result  Value Ref Range    Sodium 154 (H) 136 - 144 mEQ/L    Potassium 4.3 3.6 - 5.1 mEQ/L    Chloride 123 (H) 98 - 109 mEQ/L    CO2 26 22 - 32 mEQ/L    BUN 50 (H) 8 - 20 mg/dL    Creatinine 0.9 0.8 - 1.3 mg/dL    Glucose 227 (H) 70 - 99 mg/dL    Calcium 8.3 (L) 8.9 - 10.3 mg/dL    eGFR >60.0 >=60.0 mL/min/1.73m*2    Anion Gap 5 3 - 15 mEQ/L   POCT Glucose    Collection Time: 08/10/22 11:11 PM   Result Value Ref Range    POCT Bedside Glucose 162 (H) 70 - 99 mg/dL    POC Test POC    POCT Glucose    Collection Time: 08/11/22 12:00 AM   Result Value Ref Range    POCT Bedside Glucose 153 (H) 70 - 99 mg/dL    POC Test POC    POCT Glucose    Collection Time: 08/11/22  1:41 AM   Result Value Ref Range    POCT Bedside Glucose 145 (H) 70 - 99 mg/dL    POC Test POC    POCT Glucose    Collection Time: 08/11/22  3:07 AM   Result Value Ref Range    POCT Bedside Glucose 153 (H) 70 - 99 mg/dL    POC Test POC    POCT Glucose    Collection Time: 08/11/22  4:54 AM   Result Value Ref Range    POCT Bedside Glucose 141 (H) 70 - 99 mg/dL    POC Test POC    CBC    Collection Time: 08/11/22  4:55 AM   Result Value Ref Range    WBC 16.52 (H) 3.80 - 10.50 K/uL    RBC 3.45 (L) 4.50 - 5.80 M/uL    Hemoglobin 11.7 (L) 13.7 - 17.5 g/dL    Hematocrit 36.7 (L) 40.1 - 51.0 %    .4 (H) 83.0 - 98.0 fL    MCH 33.9 (H) 28.0 - 33.2 pg    MCHC 31.9 (L) 32.2 - 36.5 g/dL    RDW 15.2 (H) 11.6 - 14.4 %    Platelets 98 (L) 150 - 350 K/uL    MPV 11.5 9.4 - 12.4 fL   Magnesium    Collection Time: 08/11/22  4:55 AM   Result Value Ref Range    Magnesium 2.2 1.8 - 2.5 mg/dL   Phosphorus    Collection Time: 08/11/22  4:55 AM   Result Value Ref Range    Phosphorus 1.9 (L) 2.4 - 4.7 mg/dL   Protime-INR    Collection Time: 08/11/22  4:55 AM   Result Value Ref Range    PT 17.7 (H) 12.2 - 14.5 sec    INR 1.5     Calcium, ionized    Collection Time: 08/11/22  4:55 AM   Result Value Ref Range    Ionized Calcium, Venous 1.20 1.15 - 1.27 mmol/L   Comprehensive metabolic  panel    Collection Time: 08/11/22  4:55 AM   Result Value Ref Range    Sodium 154 (H) 136 - 144 mEQ/L    Potassium 4.2 3.6 - 5.1 mEQ/L    Chloride 123 (H) 98 - 109 mEQ/L    CO2 27 22 - 32 mEQ/L    BUN 47 (H) 8 - 20 mg/dL    Creatinine 0.9 0.8 - 1.3 mg/dL    Glucose 168 (H) 70 - 99 mg/dL    Calcium 8.1 (L) 8.9 - 10.3 mg/dL    AST (SGOT) 44 (H) 15 - 41 IU/L    ALT (SGPT) 17 16 - 63 IU/L    Alkaline Phosphatase 73 35 - 126 IU/L    Total Protein 5.2 (L) 6.0 - 8.2 g/dL    Albumin 1.7 (L) 3.4 - 5.0 g/dL    Bilirubin, Total 1.2 0.3 - 1.2 mg/dL    eGFR >60.0 >=60.0 mL/min/1.73m*2    Anion Gap 4 3 - 15 mEQ/L   Blood Gas, arterial Comprehensive    Collection Time: 08/11/22  5:56 AM   Result Value Ref Range    pH, Arterial 7.42 7.35 - 7.45    pCO2, Arterial 38 35 - 48 mm Hg    pO2, Arterial 263 (H) 83 - 100 mm Hg    HCO3, Arterial 25.2 21.0 - 28.0 mEQ/L    Base Excess, Arterial 0.3 mEQ/L    O2 Sat, Arterial      TCO2, Arterial 25.8 22.0 - 32.0 mEQ/L    Lactate, Art 1.4 0.4 - 1.6 mmol/L    Glucose, Arterial 182 (H) 70 - 99 mg/dL    Sodium, Arterial 148 (H) 136 - 145 mEQ/L    Potassium, Arterial 4.1 3.4 - 4.5 mEQ/L    Chloride, Arterial 122 (H) 98 - 109 mEQ/L    Ionized Calcium, Arterial 1.16 1.15 - 1.27 mmol/L    Hemoglobin, Arterial      Carboxyhemoglobin      Methemoglobin      Source Of Oxygen PRVC     FIO2 40    POCT Glucose    Collection Time: 08/11/22  6:58 AM   Result Value Ref Range    POCT Bedside Glucose 155 (H) 70 - 99 mg/dL    POC Test POC    POCT Glucose    Collection Time: 08/11/22  7:51 AM   Result Value Ref Range    POCT Bedside Glucose 146 (H) 70 - 99 mg/dL    POC Test POC    POCT Glucose    Collection Time: 08/11/22  8:54 AM   Result Value Ref Range    POCT Bedside Glucose 134 (H) 70 - 99 mg/dL    POC Test POC      Serum creatinine: 0.9 mg/dL 08/11/22 0455  Estimated creatinine clearance: 61.7 mL/min  Microbiology Results     Procedure Component Value Units Date/Time    MRSA Screen, Nares Only Nose  [[id number]]  (Normal) Collected: 08/02/22 2248    Specimen: Nasal Swab from Nose Updated: 08/03/22 0030     MRSA DNA, Nares Negative    SARS-CoV-2 (COVID-19), PCR Nasopharynx [[id number]]  (Normal) Collected: 08/02/22 1633    Specimen: Nasopharyngeal Swab from Nasopharynx Updated: 08/02/22 1807    Narrative:      The following orders were created for panel order SARS-CoV-2 (COVID-19), PCR Nasopharynx.  Procedure                               Abnormality         Status                     ---------                               -----------         ------                     SARS-CoV-2 (COVID-19), P...[[id number]]  Normal              Final result                 Please view results for these tests on the individual orders.    SARS-CoV-2 (COVID-19), PCR Nasopharynx [[id number]]  (Normal) Collected: 08/02/22 1633    Specimen: Nasopharyngeal Swab from Nasopharynx Updated: 08/02/22 1807     SARS-CoV-2 (COVID-19) Negative    Narrative:      Testing performed using real-time PCR for detection of COVID-19. EUA approved validation studies performed on site.     Anaerobic Culture / Smear (includes Aerobic Culture) [[id number]] Collected: 08/02/22 1536    Specimen: Abscess Fluid from Buttock, Left Updated: 08/02/22 2247     Gram Stain Result 3+ WBC      2+ Gram positive cocci      2+ Gram positive bacilli    SARS-CoV-2 (COVID-19), PCR Nasopharynx [[id number]]  (Normal) Collected: 07/25/22 1341    Specimen: Nasopharyngeal Swab from Nasopharynx Updated: 07/25/22 1525    Narrative:      The following orders were created for panel order SARS-CoV-2 (COVID-19), PCR Nasopharynx.  Procedure                               Abnormality         Status                     ---------                               -----------         ------                     SARS-CoV-2 (COVID-19), P...[[id number]]  Normal              Final result                 Please view results for these tests on the individual orders.    SARS-CoV-2 (COVID-19), PCR  Nasopharynx [[id number]]  (Normal) Collected: 07/25/22 1341    Specimen: Nasopharyngeal Swab from Nasopharynx Updated: 07/25/22 1525     SARS-CoV-2 (COVID-19) Negative    SARS-CoV-2 (COVID-19), PCR Nasopharynx [[id number]]  (Normal) Collected: 07/22/22 2001    Specimen: Nasopharyngeal Swab from Nasopharynx Updated: 07/22/22 2125    Narrative:      The following orders were created for panel order SARS-CoV-2 (COVID-19), PCR Nasopharynx.  Procedure                               Abnormality         Status                     ---------                               -----------         ------                     SARS-CoV-2 (COVID-19), P...[[id number]]  Normal              Final result                 Please view results for these tests on the individual orders.    SARS-CoV-2 (COVID-19), PCR Nasopharynx [[id number]]  (Normal) Collected: 07/22/22 2001    Specimen: Nasopharyngeal Swab from Nasopharynx Updated: 07/22/22 2125     SARS-CoV-2 (COVID-19) Negative    Narrative:      Testing performed using real-time PCR for detection of COVID-19. EUA approved validation studies performed on site.           IMAGING:  No results found.    Radiology Imaging    XR CHEST 1 VW    Narrative  CLINICAL HISTORY: Ataxia.    --    Impression  Findings suspicious for congestive heart failure.    COMMENT: AP radiograph of the chest was obtained.  We have no prior similar  studies for comparison.  There is cardiomegaly.  There is vascular  congestion/interstitial edema.  There are bilateral effusions.  Findings suggest  congestive heart failure.  Sternal sutures are seen.  There is no pneumothorax.  We have no prior similar studies for comparison.       Lines, Drains, Airways, Wounds:     CVC Triple Lumen 08/02/22 Internal jugular (Active)   Number of days: 1       Peripheral IV (Adult) 07/22/22 Left Forearm (Active)   Number of days: 12       Peripheral IV (Adult) 08/02/22 Right Forearm (Active)   Number of days: 1       Peripheral IV (Adult)  08/02/22 Anterior;Left;Proximal Forearm (Active)   Number of days: 1       NG/OG Tube 08/02/22 Center mouth (Active)   Number of days: 1       Urethral Catheter Temperature probe 16 Fr (Active)   Number of days: 1       ETT  (Active)   Number of days: 1       Arterial Line 08/02/22 Left Radial (Active)   Number of days: 1       Surgical Incision Buttock (Active)   Number of days: 1       PHYSICAL EXAM     General appearance: Intubated and minimally sedated.   Head: normocephalic, without obvious abnormality, atraumatic. ETT in place.   Eyes: negative, no scleral icterus.   Nose: no discharge  Neck: no JVD, symmetrical, trachea midline.    RIJ CVC in place. Left neck with swan odalis catheter in place  Lungs: Mechanical ventilation.   Chest wall: No deformities or palpable masses  Heart: Regular rate and rhythm.   Abdomen: soft, moderately distended, non-tender.   Ostomy pink, protruding, perfused, productive of gas and stool.  Genitalia: edematous, erythematous. No streaking or crepitous.    Incisions clean, dry, intact. Erythema stable.   Extremities: extremities warm and well-perfused.   Right A-Line in place. Good waveform.   Pulses: 2+ and symmetric.  Skin: Skin color, texture, turgor normal.  Neurologic: Unable to assess secondary to sedation.       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 male HD#9 s/p EUA, wound debridement for necrotizing soft tissue infection of the perirectal and perineal tissue on 8/2. 7 Days Post-Op      NEURO: Intubated & sedated. On Precedex 0.7.  Dilaudid as needed.  Fentanyl off. Follows commands.     CV: aFib. HR 70-100s. Levo of 4. CUFF  PRESSURES for pressor titration. Metop 2.5mg Q6.  [place]-Niki removed.  EF 40-45% (8/3)     PULM: ETT. 20/500/40%/6. ETT exchanged on 8/10. SBT daily.  Tolerated for 12 hours yesterday.    GI:  NPO. PPI. LUQ end-colostomy. Productive gas & stool. Tube feeds  at goal.  Free water flushes at 75 cc every 2 hours.    : Fc. Cr: 1.0 (1.1). >1.0cc/kg/hr UOP. Genitalia erythematous. Daily dressing changes.    HEME: HgB of 12.6. plts 89 this am.    ID: WBC: 16.52 (19.34) Pancultures from 8/8 pending. NSTI. Cefipime. OR Cx: 2+ E. Coli, 2+ streptoccus anginous, 2+ enterobacter. Sputum w/ 4+ yeast. BC from 8/8 w/ NG48.    ENDO: Insulin ggt. -180 last 24hrs.     DVT PPX: SCDs & sq heparin TID    GI PPX:  PPI    DISPOSITION:  SICU.     Please page [phone] or [phone] with any questions or concerns.  Connor Evans MD  8/11/2022  9:06 AM

## 2022-08-11 NOTE — PLAN OF CARE
PT remained intubated and lightly sedated over night shift.  He tolerated the tube feeds at goal with no issues.  His norepinephrine was decreased to 5 from 10 during the shift but he did require an increase in precedex and needed pain medication once.  His temperature gor as high as 100.2f but decreased with environmental changes.  Will continue to monitor.

## 2022-08-11 NOTE — PLAN OF CARE
Problem: Adult Inpatient Plan of Care  Goal: Plan of Care Review  Outcome: Progressing  Flowsheets (Taken 8/11/2022 1401)  Progress: improving  Plan of Care Reviewed With:   patient   family  Outcome Summary: precedex weaned throughout the day. pt remains on insulin gtt. bp sensitive to pain meds/iv lopressor causing increased need for levo this am. levo then able to wean throughout afternoon. neurologic assesment unchanged. urine output adequate.  Goal: Patient-Specific Goal (Individualized)  Outcome: Progressing  Flowsheets (Taken 8/11/2022 1401)  Patient-Specific Goals (Include Timeframe): extubate when stable/goals of care  Individualized Care Needs:   q2 turns   wound care   vap prevention  Goal: Absence of Hospital-Acquired Illness or Injury  Outcome: Progressing  Goal: Optimal Comfort and Wellbeing  Outcome: Progressing  Goal: Readiness for Transition of Care  Outcome: Progressing   Plan of Care Review  Plan of Care Reviewed With: patient, family  Progress: improving  Outcome Summary: precedex weaned throughout the day. pt remains on insulin gtt. bp sensitive to pain meds/iv lopressor causing increased need for levo this am. levo then able to wean throughout afternoon. neurologic assesment unchanged. urine output adequate.

## 2022-08-11 NOTE — PATIENT CARE CONFERENCE
Care Progression Rounds Note  Date: 8/11/2022  Time: 8:24 AM     Patient Name: Samy Elena     Medical Record Number: [id number]   YOB: 1930  Sex: Male      Room[facility]Bed: [facility]    Admitting Diagnosis: Necrotizing soft tissue infection [M79.89]   Admit Date/Time: 8/2/2022  4:13 PM    Primary Diagnosis: Palliative care by specialist  Principal Problem: Palliative care by specialist    GMLOS: 9.6  Anticipated Discharge Date: 8/14/2022    AM-PAC:  Mobility Score:      Discharge Planning:  Concerns to be Addressed: care coordination/care conferences, discharge planning  Anticipated Discharge Disposition: other (see comments)    Barriers to Discharge:  Delay in patient/family decision making, Medical issues not resolved    Comments:       Participants:  advanced practice provider, , physical therapy, physician, social work/services

## 2022-08-11 NOTE — PROGRESS NOTES
Palliative Care Progress Note    Patient Name: Samy Elena  Patient MRN: [id number]  Date / Time: 8/11/2022 11:16 AM   Attending Physician: Mat Bryant MD    This is Hospital Day: 10      Interval History (Subjective)    Source: chart review and unobtainable from patient due to mental status    No acute events overnight. He remains intubated, sedated. Does not respond to voice or touch for me.    Review of Systems:  Review of systems not obtained due to patient is intubated and sedated    Current Medications:  •  acetaminophen, 650 mg, oral, q4h PRN  •  cefepime, 2 g, intravenous, q12h INT  •  chlorhexidine, 15 mL, Mouth/Throat, 2 times per day  •  dexmedetomidine in 0.9 % NaCl, 0.2-1.5 mcg/kg/hr, intravenous, Titrated  •  glucose, 16-32 g of dextrose, oral, PRN **OR** dextrose, 15-30 g of dextrose, oral, PRN **OR** glucagon, 1 mg, intramuscular, PRN **OR** dextrose 50 % in water (D50), 25 mL, intravenous, PRN  •  insulin, 0-15 Units/hr, intravenous, Titrated **AND** dextrose 50 % in water (D50), 10-30 mL, intravenous, PRN  •  heparin (porcine), 5,000 Units, subcutaneous, q8h IVÁN  •  HYDROmorphone, 0.25 mg, intravenous, q3h PRN  •  metoprolol, 2.5 mg, intravenous, q6h INT  •  norepinephrine, 0.5-90 mcg/min, intravenous, Titrated  •  pantoprazole, 40 mg, intravenous, q24h  •  potassium phosphate, 20 mmol, intravenous, Once  •  sodium hypochlorite, , Topical, Daily    Palliative Assessment:    Current Palliative Performance Status:  10%  Baseline Palliative Performance Status: Unknown   Preferred Setting for Care: TBD  Estimated Palliative Prognosis: TBD  Patient Prognostic Awareness: unknown  Patient Status: Disease state: Controlled with current treatments.    Objective    Physical Exam:  General:   No Acute Distress and Frail  Eyes:  eyes closed  ENMT:  Mucus Membranes Dry ; ETT in place  CV:  Rate  normal + edema  Lungs:  Respiratory Rate  Normal without labored breathing  Abdomen:  nondistended   Psych:   sedated  Neuro:  sedated  Skin:  pale  Musculoskeletal: cool extremities     Vitals:  Vitals:    08/11/22 1100   BP:    Pulse: 74   Resp:    Temp:    SpO2: 100%       Intake & Output:  I/O last 3 completed shifts:  In: 4794.9 [I.V.:1691.9; NG/GT:2903; IV Piggyback:200]  Out: 2680 [Urine:2280; Stool:400]    Laboratory Studies:    CBC Results       08/11/22 08/10/22 08/09/22     0455 0631 0414    WBC 16.52 19.34 16.57    RBC 3.45 3.57 3.59    HGB 11.7 12.6 12.3    HCT 36.7 39.3 37.8    .4 110.1 105.3    MCH 33.9 35.3 34.3    MCHC 31.9 32.1 32.5    PLT 98 89 84         Comment for PLT at 0631 on 08/10/22: CONSISTENT WITH PREVIOUS RESULTS        CMP Results       08/11/22 08/10/22 08/10/22     0455 2207 1401     154 153    K 4.2 4.3 4.7    Cl 123 123 125    CO2 27 26 21    Glucose 168 227 221    BUN 47 50 53    Creatinine 0.9 0.9 0.9    Calcium 8.1 8.3 8.0    Anion Gap 4 5 7    AST 44 -- --    ALT 17 -- --    Albumin 1.7 -- --    EGFR >60.0 >60.0 >60.0        Troponin I Results    No lab values to display.       ABG Results       08/11/22 08/10/22 08/09/22     0556 1111 1219    pH 7.42 7.38 7.38    pCO2 38 44 42    pO2 263 158 175    HCO3 25.2 25.3 24.6    Base Excess 0.3 0.5 -0.4    Source Of Oxygen PRVC ps 8/6 mv          I have reviewed the patient's pertinent labs to the time of note.  Significant abnormals are leukocytosis, anemia, thrombocytopenia, albumin 1.7, hypernatremia.    Imaging and Other Studies:     Radiology Imaging    XR CHEST 1 VW    Narrative  CLINICAL HISTORY: Tube positioning.    COMMENT:    Comparison: 8/10/2022 5:11 AM    Technique: Single portable AP view of the thorax was performed.    Findings:  Tubes and Lines: Endotracheal tube is with its tip approximately 3 cm from the  level of the pamella. Nasogastric tube seen traversing the level of the  diaphragm, however the tip is not visualized. Right internal jugular central  venous catheter is with its tip overlying the region of the  cavoatrial junction.  Left internal jugular [place] catheter is with its tip overlying the region of  the right main pulmonary artery. Overlying cardiac leads.    Lungs: Suggestion of layering right greater than left pleural effusions with  associated bibasilar opacities, likely similar compared to earlier study of the  same day. No pneumothorax is identified.    Cardiomediastinal silhouette: Stable cardiomegaly. Uncoiled thoracic aorta.  Median sternotomy wires.    --    Impression  Endotracheal tube with tip approximately 3 cm in the level of the pamella.  Additional tubes and lines as described. Suggestion of layering right greater  than left pleural effusions with associated bibasilar opacities, likely similar  compared to earlier study of the same day.                                                                      Cardiac Imaging    TRANSTHORACIC ECHO (TTE) LIMITED 08/03/2022    Interpretation Summary  · Normal-sized LV. Mildly decreased LV systolic function. Estimated EF 40- 45%. Markedly abnormal LV septal wall motion. Normal LV wall thickness. Unable to assess diastolic filling pattern of the LV.  · Severely dilated RV. Severely reduced RV systolic function.  · Mildly dilated LA.  · Severely dilated RA. Catheter present in the RA.  · Aorta not well visualized. Aortic root sclerotic.  · Sclerotic aortic valve leaflets. Trace aortic valve regurgitation.  · Sclerotic mitral valve. Trace mitral valve regurgitation.  · Pulmonic valve not well visualized.  · Dilated tricuspid annulus with leaflet failure to coapt. Large gap visualized, approximately 1 cm. Severe tricuspid valve regurgitation. Vena contracta 1.0cm. Unable to estimate RVSP in the setting of severe tricuspid regurgitation and single-chamber physiology. Reversed hepatic vein systolic flow.  · IVC is dilated (>2.1cm). IVC collapses <50% during inspiration.  · No evidence of pericardial effusion.  · No prior study available for comparison.        I have independently reviewed the pertinent imaging to the time of note and agree with reported results.    I have independently reviewed the pertinent cardiac studies to the time of note and agree with reported results.    Assessment/Plan  Samy Elena is a 89 y.o. 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 S/p OR and I+D, now intubated and sedated. Vent day #9. Palliative Care consulted for GOC.  -see ACP note for initial GOC with one of patient's sons - planning for family meeting tomorrow 8/12 at 1 PM - palliative will attend   - goals remain restorative at this time  - code status: FULL CODE  - pain appears to be well controled at this time , agree with weaning off fentanyl and continue with dilaudid 0.5mg every 3 hours as needed   - palliative care will continue to follow          We will continue to follow along with you.    Shital Mi MD  Office [phone]

## 2022-08-11 NOTE — PROGRESS NOTES
Cardiology Progress Note   [facility]    [facility]  [facility] [address]  [address] [place] [address]    TEL  [phone]  [facility]       Patient: Samy Elena  MRN: [id number]  : 10/6/1930  Admission Date: 2022   Consult Date: 22  Reason for Consult: H/o HFrEF, PH, atrial fibrillation -postop management   Outpatient Cardiologist: Dr. Yovanny Cruz ( office visit 2022)     Interval History:   Seen and examined this am.   He is intubated and sedated.     HPI:   Samy Elena is a 89 y.o. 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    Cardiology is being consulted for management recommendations given his extensive cardiac history.     Her chart review, the patient had a recent admission at the end of July with changes in speech. CVA was ruled out at the time of that admission .symptoms was attributed to  postural/orthostatic changes related to symptomatic hypotension. Patient was discharged to rehabilitation unit where he developed  buttocks and pubic pain.He was referred to be seen by surgery as outpatient that lead to current admission.       Past Medical History:   Diagnosis Date   • Atrial fibrillation (CMS/HCC)    • Disease of thyroid gland    • GI (gastrointestinal bleed)    • Hypertension    • Myocardial infarction (CMS/HCC)           Past Surgical History:   Procedure Laterality Date   • BYPASS GRAFT         Social History     Socioeconomic History   • Marital status:      Spouse name: Not on file   • Number of children: Not on file   • Years of education: Not on file   • Highest education level: Not on file   Occupational History   • Not on file   Tobacco Use   • Smoking status: Never Smoker   • Smokeless tobacco:  Never Used   Substance and Sexual Activity   • Alcohol use: Not on file   • Drug use: Not on file   • Sexual activity: Not on file   Other Topics Concern   • Not on file   Social History Narrative   • Not on file     Social Determinants of Health     Financial Resource Strain: Not on file   Food Insecurity: No Food Insecurity   • Worried About Running Out of Food in the Last Year: Never true   • Ran Out of Food in the Last Year: Never true   Transportation Needs: Not on file   Physical Activity: Not on file   Stress: Not on file   Social Connections: Not on file   Intimate Partner Violence: Not on file   Housing Stability: Not on file        History reviewed. No pertinent family history.     Jardiance [empagliflozin]    Current Outpatient Medications   Medication Instructions   • acetaminophen (TYLENOL) 325 mg tablet oral   • bisacodyL (DULCOLAX) 10 mg suppository rectal   • clopidogreL (PLAVIX) 75 mg, oral, Daily   • coenzyme Q10 (COQ10) 100 mg, oral, Daily   • empagliflozin (JARDIANCE) 10 mg, oral, Daily   • levothyroxine (SYNTHROID) 100 mcg, oral, Daily (6:30a)   • metoprolol succinate XL (TOPROL-XL) 100 mg, oral, Daily   • multivitamin (THERAGRAN) tablet oral, Daily   • sacubitriL-valsartan (ENTRESTO) 49-51 mg per tablet 1 tablet, oral, 2 times daily   • sildenafiL (pulm.hypertension) (REVATIO) 20 mg, oral, 3 times daily   • simvastatin (ZOCOR) 10 mg, oral, Nightly   • spironolactone (ALDACTONE) 25 mg, oral, Daily   • torsemide (DEMADEX) 10-20 mg, oral, Daily, 10 mg if weight is 176-181 lbs, 20 mg if weight is >181 lbs       Scheduled Meds:  • cefepime  2 g intravenous q12h INT   • chlorhexidine  15 mL Mouth/Throat 2 times per day   • heparin (porcine)  5,000 Units subcutaneous q8h IVÁN   • metoprolol  2.5 mg intravenous q6h INT   • pantoprazole  40 mg intravenous q24h   • sodium hypochlorite   Topical Daily     Continuous Infusions:  • dexmedetomidine in 0.9 % NaCl  0.2-1.5 mcg/kg/hr 0.6 mcg/kg/hr (08/11/22  "1015)   • insulin  0-15 Units/hr 1.755 Units/hr (08/11/22 1059)   • norepinephrine  0.5-90 mcg/min 9 mcg/min (08/11/22 1101)     PRN Meds:.•  acetaminophen  •  glucose **OR** dextrose **OR** glucagon **OR** dextrose 50 % in water (D50)  •  insulin **AND** dextrose 50 % in water (D50)  •  HYDROmorphone      Intake/Output Summary (Last 24 hours) at 8/11/2022 1112  Last data filed at 8/11/2022 1000  Gross per 24 hour   Intake 3389.55 ml   Output 1895 ml   Net 1494.55 ml        Weights (last 7 days)     None           Wt Readings from Last 3 Encounters:   08/03/22 80.3 kg (177 lb)   08/02/22 80.3 kg (177 lb)   07/25/22 81.1 kg (178 lb 11.2 oz)        REVIEW OF SYSTEMS:    A complete review of systems limited by sedation.    PHYSICAL EXAMINATION:  Visit Vitals  BP (!) 106/56   Pulse 74   Temp 37.1 °C (98.8 °F) (Bladder)   Resp 20   Ht 1.854 m (6' 1\")   Wt 80.3 kg (177 lb)   SpO2 100%   BMI 23.35 kg/m²     Body surface area is 2.03 meters squared.  Body mass index is 23.35 kg/m².  General: intubated and sedated   HEENT: No corneal arcus or xanthelasmas.  Sclerae are anicteric.   Neck: CVC on the right.   Heart: s1s2 audible, no significant murmurs , irregular   Chest: Symmetrical.  Lungs: intubated. Breathing sounds audible bl .  Abdomen: soft, nt   Extremities: No cyanosis or clubbing.no lower extremity edema.  Distal pulses are easily palpable.  Skin: Warm and dry and well perfused.  Neurologic:  na  Psychiatric:na    Labs   Results from last 7 days   Lab Units 08/11/22  0455 08/10/22  2207 08/10/22  1401 08/10/22  0631 08/09/22  1815   SODIUM mEQ/L 154* 154* 153* 154* 154*   POTASSIUM mEQ/L 4.2 4.3 4.7 4.5 4.1   CHLORIDE mEQ/L 123* 123* 125* 121* 123*   CO2 mEQ/L 27 26 21* 27 27   BUN mg/dL 47* 50* 53* 54* 56*   CREATININE mg/dL 0.9 0.9 0.9 0.9 1.0   CALCIUM mg/dL 8.1* 8.3* 8.0* 8.3* 7.8*   ALBUMIN g/dL 1.7*  --   --  1.8* 1.8*   BILIRUBIN TOTAL mg/dL 1.2  --   --  1.3* 1.0   ALK PHOS IU/L 73  --   --  76 65   ALT IU/L " 17  --   --  17 16   AST IU/L 44*  --   --  41 33   GLUCOSE mg/dL 168* 227* 221* 137* 160*       No results found for: HSTROPONINI    Results from last 7 days   Lab Units 08/11/22  0455 08/10/22  0631 08/09/22  0414   WBC K/uL 16.52* 19.34* 16.57*   HEMOGLOBIN g/dL 11.7* 12.6* 12.3*   HEMATOCRIT % 36.7* 39.3* 37.8*   PLATELETS K/uL 98* 89* 84*       Lab Results   Component Value Date    CHOL 94 07/23/2022    HDL 23 (L) 07/23/2022    LDLCALC 63 07/23/2022    TRIG 41 07/23/2022    TSH 4.72 08/01/2022    HGBA1C 6.3 (H) 07/23/2022       Outside records reviewed..    Imaging  CXR reviewed     Cardiac Studies  TTE 04/13/2022 ( outside report , images not available)     •  A complete transthoracic echocardiogram (including 2D, color flow   Doppler, spectral Doppler and M-mode imaging) was performed using the   standard protocol. The study quality was adequate and poor.  •  The left ventricle is normal in size. Endocardium is incompletely   visualized and segmental wall motion abnormalities cannot be excluded.   Moderately decreased left ventricular ejection fraction. The left   ventricular ejection fraction by visual estimate is 35% to 40%.  •  There is indeterminate diastolic function.  •  The right ventricle is severely dilated. There is moderately to   severely decreased systolic function of the right ventricle.  •  The left atrium is moderately dilated.  •  The right atrium is severely dilated.  •  There is trivial mitral valve leaflet thickening. There is trace mitral   regurgitation. There is no mitral stenosis.  •  The aortic valve is trileaflet. There is mild aortic valve thickening.   There is mild aortic valve calcification. There is no aortic stenosis.   There is mild aortic regurgitation.  •  There is moderate to severe tricuspid regurgitation.  •  The inferior vena cava is dilated (diameter >21 mm) and decreases <50%   in size with inspiration, suggesting an elevated right atrial pressure of   15 mmHg (range  10-20 mm Hg).  •  There is no prior study available for comparison at this organization.    Left Ventricle  The left ventricle is normal in size. Endocardium is incompletely visualized and segmental wall motion abnormalities cannot be excluded. The left ventricular ejection fraction by visual estimate is 35% to 40%. Moderately decreased left ventricular ejection fraction. There is indeterminate diastolic function.    Right Ventricle  The right ventricle is severely dilated. Off axis imaging of the right ventricle, but visually systolic function appears moderate to severely reduced.    Left Atrium  The left atrium is moderately dilated.    Right Atrium  The right atrium is severely dilated.    IVC/SVC  The inferior vena cava is dilated (diameter >21 mm) and decreases <50% in size with inspiration, suggesting an elevated right atrial pressure of 15 mmHg (range 10-20 mm Hg).    Mitral Valve  There is trivial mitral valve leaflet thickening. There is no mitral stenosis. There is trace mitral regurgitation.    Tricuspid Valve  There is moderate tricuspid annular dilatation. There is no tricuspid stenosis. There is moderate to severe tricuspid regurgitation.    Aortic Valve  The aortic valve is trileaflet. There is mild aortic valve thickening. There is mild aortic valve calcification. There is no aortic stenosis. There is mild aortic regurgitation.    Pulmonic Valve  There is no pulmonic stenosis. There is mild pulmonic valve regurgitation.    Pericardium  There are echocardiographic findings consistent with fat pad.    Aortic Arch/Descending/Abdominal Aorta  The aortic root is normal in size. The proximal segment of the ascending aorta is normal in size.     ECG   07/22/2022   Atrial fibrillation   rbbb   twi on precordial leads, present on ecg 07/2022     Telemetry  Atrial fibrillation , rbbb with ventricular response in 80-90    Assessment:  This is a 89 y.o. male being consulted for cardiovascular  management    #Justin's gangrene:   #Septic shock:   Management per primary service   On abx; id has been following   empagliflozin should be discontinued at the time of discharge. He was recently started on this agent in may 2022      # iCM:  # HFrEF:  # PH on sildenafil as outpatient:    NYHA class III and he has ACC/AHA Stage C as outpatient previously     On norepinephrin 12 following hydromorphone and now weaning back down     His lvef appears 40-45% with severe RV dilation and diastolic dysfunction.     He is on increasing rate of norepinephrine     He has been improving overall from septic shock standpoint     Hold off on further diuresis   Increased fwf by primary team given hyponatremia ; consider D5 for the degree of hypernatremia   Agree with initiation of midodrine tid to try and wean off pressors  Strict intake and output   Continue holding entresto spironolactone and sildenafil  We will be following along pending C discussion with family      # Permanent atrial fibrillation:   [id number]     Rates in the 80-90    Not on AC as outpatient given history of GIB   Change metoprolol IV to metoprolol tartrate 12.5 mg bid through ogt to avoid bp drops with iv formulation.     Case  discussed with Dr. Price. Final recommendations are pending attending co-signature. Please page Cardiology at [phone] with any questions.     aSbiha Cordon MD  8/11/2022

## 2022-08-11 NOTE — PROGRESS NOTES
"Infectious Disease Progress Note    Patient Name: Samy Elena  MR#: [id number]  : 10/6/1930  Admission Date: 2022  Date: 22   Time: 8:01 AM   Author: Toni Ramirez MD        Antibiotics:    Anti-infectives (From admission, onward)    Start     Dose/Rate Route Frequency Ordered Stop    22 1200  ceFEPIme (MAXIPIME) 2 g in 100 mL NSS vial in bag         2 g  200 mL/hr over 30 Minutes intravenous Every 12 hours interval 22 1110            Subjective   Low-grade fevers noted, persistent hypotension requiring hemodynamic support.  Remains intubated.  Tolerating tube feeds.  Left IJ introducer removed.    Objective     Vital Signs:    Visit Vitals  /63   Pulse 63   Temp 37.1 °C (98.8 °F) (Bladder)   Resp (!) 28   Ht 1.854 m (6' 1\")   Wt 80.3 kg (177 lb)   SpO2 100%   BMI 23.35 kg/m²       No data recorded.      Physical Exam:       General: Elderly man, intubated, sedated, pale and frail looking.  HEENT: NC/AT/ anicteric sclera, pharynx clear, ET tube in place, NG tube in place  Neck: supple, no meningismus, right IJ TLC  Cardiovascular: regular S1/S2, distant sounds, soft systolic murmur.   Respiratory: clear to auscultation anteriorly  GI/Abdomen: Obese,  bowel sounds decreased, colostomy bag in place with abundant stools.  Abdomen does not appear tender.  Extremities: Edematous extremities   JOINTS:  No swelling, erythema, or pain  Lymphatics: no lymphadenopathy  Neurology: Intubated and sedated   psych: Intubated and sedated   skin: Blisters in penile shaft have denuded and expose healthy looking  subcutaneous tissue.  No crepitus appreciated, no necrosis.  G.U.: Penis and scrotum edema, no crepitus.  Will in place.  Significant area of denudation of skin of penis  Lines: Right IJ TLC C/DI         Lines, Drains, Airways, Wounds:  CVC Triple Lumen 22 Internal jugular (Active)   Number of days: 9       NG/OG Tube 22 Center mouth (Active)   Number of days: 9     "   Colostomy Other (comment) LUQ (Active)   Number of days: 8       Urethral Catheter Temperature probe 16 Fr (Active)   Number of days: 9       ETT  (Active)   Number of days: 1       Surgical Incision Buttock (Active)   Number of days: 9       Surgical Incision Abdomen (Active)   Number of days: 8       Surgical Incision Perineum (Active)   Number of days: 8       Catheterization Site - Venous Left Internal Jugular 7 Fr. (Active)   Number of days: 7       Labs:    CBC Results       08/11/22 08/10/22 08/09/22     0455 0631 0414    WBC 16.52 19.34 16.57    RBC 3.45 3.57 3.59    HGB 11.7 12.6 12.3    HCT 36.7 39.3 37.8    .4 110.1 105.3    MCH 33.9 35.3 34.3    MCHC 31.9 32.1 32.5    PLT 98 89 84         Comment for PLT at 0631 on 08/10/22: CONSISTENT WITH PREVIOUS RESULTS        CMP Results       08/11/22 08/10/22 08/10/22     0455 2207 1401     154 153    K 4.2 4.3 4.7    Cl 123 123 125    CO2 27 26 21    Glucose 168 227 221    BUN 47 50 53    Creatinine 0.9 0.9 0.9    Calcium 8.1 8.3 8.0    Anion Gap 4 5 7    AST 44 -- --    ALT 17 -- --    Albumin 1.7 -- --    EGFR >60.0 >60.0 >60.0            Micro:  Microbiology Results     Procedure Component Value Units Date/Time    Sputum culture / smear Expectorated Sputum [[id number]]  (Abnormal) Collected: 08/08/22 0311    Specimen: Expectorated Sputum Updated: 08/10/22 0705    Narrative:      The following orders were created for panel order Sputum culture / smear Expectorated Sputum.  Procedure                               Abnormality         Status                     ---------                               -----------         ------                     Sputum Gram Stain (Lab O...[[id number]]                      Final result               Sputum Culture (Lab Only...[[id number]]  Abnormal            Final result                 Please view results for these tests on the individual orders.    Sputum Gram Stain (Lab Only) Expectorated Sputum [[id number]]  Collected: 08/08/22 0311    Specimen: Expectorated Sputum Updated: 08/08/22 1038     Gram Stain Result 2+ WBC      No Epithelial Cells Seen      2+ Yeast    Sputum Culture (Lab Only) Expectorated Sputum [[id number]]  (Abnormal) Collected: 08/08/22 0311    Specimen: Expectorated Sputum Updated: 08/10/22 0705     Culture **Positive Culture**      4+ Yeast, No Further Identification      No Normal Jennifer    Blood Culture Blood, Venous [[id number]]  (Normal) Collected: 08/08/22 0235    Specimen: Blood, Venous Updated: 08/11/22 0301     Culture No growth at 72 hours    Blood Culture Blood, Venous [[id number]]  (Normal) Collected: 08/08/22 0235    Specimen: Blood, Venous Updated: 08/11/22 0301     Culture No growth at 72 hours    MRSA Screen, Nares Only Nose [[id number]]  (Normal) Collected: 08/02/22 2248    Specimen: Nasal Swab from Nose Updated: 08/03/22 0030     MRSA DNA, Nares Negative    SARS-CoV-2 (COVID-19), PCR Nasopharynx [[id number]]  (Normal) Collected: 08/02/22 1633    Specimen: Nasopharyngeal Swab from Nasopharynx Updated: 08/02/22 1807    Narrative:      The following orders were created for panel order SARS-CoV-2 (COVID-19), PCR Nasopharynx.  Procedure                               Abnormality         Status                     ---------                               -----------         ------                     SARS-CoV-2 (COVID-19), P...[[id number]]  Normal              Final result                 Please view results for these tests on the individual orders.    SARS-CoV-2 (COVID-19), PCR Nasopharynx [[id number]]  (Normal) Collected: 08/02/22 1633    Specimen: Nasopharyngeal Swab from Nasopharynx Updated: 08/02/22 1807     SARS-CoV-2 (COVID-19) Negative    Narrative:      Testing performed using real-time PCR for detection of COVID-19. EUA approved validation studies performed on site.     Blood Culture Blood, Venous [[id number]]  (Normal) Collected: 08/02/22 1628    Specimen: Blood, Venous Updated: 08/07/22 1901      Culture No growth at 120 hours    Blood Culture Blood, Venous [[id number]]  (Normal) Collected: 08/02/22 1628    Specimen: Blood, Venous Updated: 08/07/22 1901     Culture No growth at 120 hours    Anaerobic Culture / Smear (includes Aerobic Culture) [[id number]]  (Abnormal)  (Susceptibility) Collected: 08/02/22 1536    Specimen: Abscess Fluid from Buttock, Left Updated: 08/07/22 0824     Culture **Positive Culture**      2+ Escherichia coli      2+ Streptococcus anginosus      2+ Enterobacter cloacae Complex      Mixed Anaerobic Growth     Gram Stain Result 3+ WBC      2+ Gram positive cocci      2+ Gram positive bacilli    SARS-CoV-2 (COVID-19), PCR Nasopharynx [[id number]]  (Normal) Collected: 07/25/22 1341    Specimen: Nasopharyngeal Swab from Nasopharynx Updated: 07/25/22 1525    Narrative:      The following orders were created for panel order SARS-CoV-2 (COVID-19), PCR Nasopharynx.  Procedure                               Abnormality         Status                     ---------                               -----------         ------                     SARS-CoV-2 (COVID-19), P...[[id number]]  Normal              Final result                 Please view results for these tests on the individual orders.    SARS-CoV-2 (COVID-19), PCR Nasopharynx [[id number]]  (Normal) Collected: 07/25/22 1341    Specimen: Nasopharyngeal Swab from Nasopharynx Updated: 07/25/22 1525     SARS-CoV-2 (COVID-19) Negative    SARS-CoV-2 (COVID-19), PCR Nasopharynx [[id number]]  (Normal) Collected: 07/22/22 2001    Specimen: Nasopharyngeal Swab from Nasopharynx Updated: 07/22/22 2125    Narrative:      The following orders were created for panel order SARS-CoV-2 (COVID-19), PCR Nasopharynx.  Procedure                               Abnormality         Status                     ---------                               -----------         ------                     SARS-CoV-2 (COVID-19), P...[[id number]]  Normal              Final result                  Please view results for these tests on the individual orders.    SARS-CoV-2 (COVID-19), PCR Nasopharynx [[id number]]  (Normal) Collected: 07/22/22 2001    Specimen: Nasopharyngeal Swab from Nasopharynx Updated: 07/22/22 2125     SARS-CoV-2 (COVID-19) Negative    Narrative:      Testing performed using real-time PCR for detection of COVID-19. EUA approved validation studies performed on site.               Imaging:    Radiology Imaging    XR CHEST 1 VW    Narrative  CLINICAL HISTORY: Tube positioning.    COMMENT:    Comparison: 8/10/2022 5:11 AM    Technique: Single portable AP view of the thorax was performed.    Findings:  Tubes and Lines: Endotracheal tube is with its tip approximately 3 cm from the  level of the pamella. Nasogastric tube seen traversing the level of the  diaphragm, however the tip is not visualized. Right internal jugular central  venous catheter is with its tip overlying the region of the cavoatrial junction.  Left internal jugular [place] catheter is with its tip overlying the region of  the right main pulmonary artery. Overlying cardiac leads.    Lungs: Suggestion of layering right greater than left pleural effusions with  associated bibasilar opacities, likely similar compared to earlier study of the  same day. No pneumothorax is identified.    Cardiomediastinal silhouette: Stable cardiomegaly. Uncoiled thoracic aorta.  Median sternotomy wires.    --    Impression  Endotracheal tube with tip approximately 3 cm in the level of the pamella.  Additional tubes and lines as described. Suggestion of layering right greater  than left pleural effusions with associated bibasilar opacities, likely similar  compared to earlier study of the same day.      Assessment     89 years old male with history of CHF, A. fib not on anticoagulation due to prior history of GI bleed, tricuspid regurgitation, pulmonary hypertension, coronary disease, who was recently discharged from hospital after he  "presented with slurred speech and hypotension.  Work-up for CVA was negative and it was concluded that he was suffering from orthostatic hypotension.  Medications were adjusted and he was discharged to rehabilitation facility where he developed buttocks and perineum pain.  He was noted to have necrotizing changes during surgical consultation and admitted to hospital for surgical debridement.  Intraoperatively, significant necrotizing process noted.  Multiple cultures were obtained showing polymicrobial process.  Taken for second look without evidence of additional necrotizing changes.       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  CHF, pulmonary edema-improving  3.  History of medical therapy with Jardiance- now discontinued and added to \"allergy\" list due to Justin's gangrene  4.  Septic and cardiogenic shock-improving  5.  Estimated Creatinine Clearance: 61.7 mL/min (by C-G formula based on SCr of 0.9 mg/dL).        Plan   1.  Overall stable and slightly improved.  2.  Complete 14 days of therapy with cefepime.  Today is day 9 of 14.  3.  Tolerating antibiotic therapy well without any side effects noted.  4.  Recommend to remove central line and transition to PICC line when possible.  5.  Discussed with surgical team.  6.  We will see him as needed.  Please contact us if any questions or concerns.    More than 25 minutes were spent obtaining a detailed interval history, detailed exam, evaluating this high complexity case with significant medical decision making including continuation of antibiotic therapy, coordination of care with primary team and consultants.      KEN BARNETT MD  INFECTIOUS DISEASES    NOTE: Some or all of the note above was created with the use of dictation software, please note this dictation software can have anomalies in its ability to transcribe. Please contact me directly for anything that seems abnormal for clarification.     "

## 2022-08-11 NOTE — PROGRESS NOTES
"Brief Nutrition Note    Recommendations   Continue goal TF of Peptamen AF at 65ml/hr    Pt with calculated 4L free water deficit. If concur, recommend increase free water flush to 110ml/hr. Decrease to 45ml/hr when Na wnl    Clinical Course: Patient is a 89 y.o. 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     Past Medical History:   Diagnosis Date   • Atrial fibrillation (CMS/HCC)    • Disease of thyroid gland    • GI (gastrointestinal bleed)    • Hypertension    • Myocardial infarction (CMS/HCC)      Past Surgical History:   Procedure Laterality Date   • BYPASS GRAFT         Reason for Assessment  Reason For Assessment: identified at risk by screening criteria (MST)     [facility] Nutrition Screen Tool  Has patient lost weight without trying?: 2-->Unsure  If yes,how much weight has been lost?: 2-->Unsure  Has patient been eating poorly due to decreased appetite?: 0-->No  [facility] Nutrition Screen Score: 4     Physical Findings  Overall Physical Appearance: on ventilator support  Gastrointestinal: colostomy (275ml)  Last Bowel Movement: 08/09/22  Tubes: Oroduodenal tube  Skin: edema, surgical incision (2+ U/ 3+ LE edema)     RETS18 Physical Appearance  Overall Physical Appearance: on ventilator support  Gastrointestinal: colostomy (275ml)  Last Bowel Movement: 08/09/22  Tubes: Oroduodenal tube  Skin: edema, surgical incision (2+ U/ 3+ LE edema)     Nutrition Order  Nutrition Order: meets nutritional requirements  Nutrition Order Comments: Peptamen AF at 65ml/hr, 75ml free water q2 hours     Anthropometrics  Height: 185.4 cm (6' 1\")     Current Weight  Weight Method: Bed scale  Weight: 80.3 kg (177 lb)     Ideal Body Weight (IBW)  Ideal Body Weight (IBW) (kg): 84.86  % Ideal Body Weight: 94.61     Body Mass Index (BMI)  BMI (Calculated): 23.4     Labs/Procedures/Meds  Lab Results Reviewed: reviewed, pertinent   BMP Results       08/11/22 08/10/22 08/10/22     7444 5752 1414 "     154 153    K 4.2 4.3 4.7    Cl 123 123 125    CO2 27 26 21    Glucose 168 227 221    BUN 47 50 53    Creatinine 0.9 0.9 0.9    Calcium 8.1 8.3 8.0     Mg 2.2, Phos 1.9, , 136, 155, 141    Medications  Pertinent Medications Reviewed: reviewed, pertinent   Scheduled Meds:  • cefepime  2 g intravenous q12h INT   • chlorhexidine  15 mL Mouth/Throat 2 times per day   • heparin (porcine)  5,000 Units subcutaneous q8h IVÁN   • metoprolol  2.5 mg intravenous q6h INT   • pantoprazole  40 mg intravenous q24h   • sodium hypochlorite   Topical Daily     Continuous Infusions:  • dexmedetomidine in 0.9 % NaCl  0.2-1.5 mcg/kg/hr 0.7 mcg/kg/hr (08/11/22 0800)   • insulin  0-15 Units/hr 1.48 Units/hr (08/11/22 0854)   • norepinephrine  0.5-90 mcg/min 10 mcg/min (08/11/22 0849)     Clinical comments:  Pt remains intubated, MAP 53 on levophed, sedated on precedex, 40% FIO2, PEEP 6. On insulin gtt at 1.48 units/ hr.     Tolerating TF of Peptamen AF at 65ml/hr with 75ml free water flush q2 hours which provides 1872 kcal (23 kcal/kg), 119g protein (1.5 g protein/kg) and 1270ml free water inherent to formula, total of 2170ml daily (27ml/kg)    Pt with calculated 4L free water deficit. If concur, recommend increase free water flush to 110ml/hr. Decrease to 45ml/hr when Na wnl for a total of 1997ml daily (25ml/kg),      Goals: meet 100% of needs via TF   Monitor: TF tolerance, plan of care     Recommendations: See above       Date: 08/11/22  Signature: Suzanne Norris RD

## 2022-08-12 ENCOUNTER — APPOINTMENT (INPATIENT)
Dept: RADIOLOGY | Facility: HOSPITAL | Age: 87
DRG: 717 | End: 2022-08-12
Attending: STUDENT IN AN ORGANIZED HEALTH CARE EDUCATION/TRAINING PROGRAM
Payer: MEDICARE

## 2022-08-12 LAB
ALBUMIN SERPL-MCNC: 1.5 G/DL (ref 3.4–5)
ALP SERPL-CCNC: 84 IU/L (ref 35–126)
ALT SERPL-CCNC: 24 IU/L (ref 16–63)
ANION GAP SERPL CALC-SCNC: 4 MEQ/L (ref 3–15)
ANION GAP SERPL CALC-SCNC: 6 MEQ/L (ref 3–15)
AST SERPL-CCNC: 67 IU/L (ref 15–41)
BASE EXCESS BLDA CALC-SCNC: 1.6 MEQ/L
BILIRUB SERPL-MCNC: 0.9 MG/DL (ref 0.3–1.2)
BUN SERPL-MCNC: 39 MG/DL (ref 8–20)
BUN SERPL-MCNC: 42 MG/DL (ref 8–20)
CA-I BLD-SCNC: 1.19 MMOL/L (ref 1.15–1.27)
CA-I BLD-SCNC: 1.28 MMOL/L (ref 1.15–1.27)
CALCIUM SERPL-MCNC: 8 MG/DL (ref 8.9–10.3)
CALCIUM SERPL-MCNC: 8 MG/DL (ref 8.9–10.3)
CHLORIDE BLDA-SCNC: 120 MEQ/L (ref 98–109)
CHLORIDE SERPL-SCNC: 120 MEQ/L (ref 98–109)
CHLORIDE SERPL-SCNC: 120 MEQ/L (ref 98–109)
CO2 BLDA-SCNC: 25.6 MEQ/L (ref 22–32)
CO2 SERPL-SCNC: 25 MEQ/L (ref 22–32)
CO2 SERPL-SCNC: 25 MEQ/L (ref 22–32)
COHGB MFR BLD: 1.6 %
CREAT SERPL-MCNC: 0.7 MG/DL (ref 0.8–1.3)
CREAT SERPL-MCNC: 0.8 MG/DL (ref 0.8–1.3)
ERYTHROCYTE [DISTWIDTH] IN BLOOD BY AUTOMATED COUNT: 15.4 % (ref 11.6–14.4)
FIO2 ON VENT: ABNORMAL %
GFR SERPL CREATININE-BSD FRML MDRD: >60 ML/MIN/1.73M*2
GFR SERPL CREATININE-BSD FRML MDRD: >60 ML/MIN/1.73M*2
GLUCOSE BLD-MCNC: 129 MG/DL (ref 70–99)
GLUCOSE BLD-MCNC: 129 MG/DL (ref 70–99)
GLUCOSE BLD-MCNC: 131 MG/DL (ref 70–99)
GLUCOSE BLD-MCNC: 131 MG/DL (ref 70–99)
GLUCOSE BLD-MCNC: 132 MG/DL (ref 70–99)
GLUCOSE BLD-MCNC: 144 MG/DL (ref 70–99)
GLUCOSE BLD-MCNC: 154 MG/DL (ref 70–99)
GLUCOSE BLD-MCNC: 156 MG/DL (ref 70–99)
GLUCOSE BLD-MCNC: 162 MG/DL (ref 70–99)
GLUCOSE BLD-MCNC: 168 MG/DL (ref 70–99)
GLUCOSE BLD-MCNC: 169 MG/DL (ref 70–99)
GLUCOSE BLD-MCNC: 173 MG/DL (ref 70–99)
GLUCOSE BLD-MCNC: 178 MG/DL (ref 70–99)
GLUCOSE BLD-MCNC: 195 MG/DL (ref 70–99)
GLUCOSE BLD-MCNC: 197 MG/DL (ref 70–99)
GLUCOSE BLD-MCNC: 201 MG/DL (ref 70–99)
GLUCOSE BLD-MCNC: 207 MG/DL (ref 70–99)
GLUCOSE BLDA-MCNC: 159 MG/DL (ref 70–99)
GLUCOSE SERPL-MCNC: 131 MG/DL (ref 70–99)
GLUCOSE SERPL-MCNC: 197 MG/DL (ref 70–99)
HCO3 BLDA-SCNC: 26.2 MEQ/L (ref 21–28)
HCT VFR BLDCO AUTO: 34.2 % (ref 40.1–51)
HGB BLD-MCNC: 11.2 G/DL (ref 13.7–17.5)
HGB BLDA OXIMETRY-MCNC: 11.4 G/DL (ref 14–17.5)
INHALED O2 CONCENTRATION: ABNORMAL %
INR PPP: 1.4
LACTATE BLDA-SCNC: 0.9 MMOL/L (ref 0.4–1.6)
MAGNESIUM SERPL-MCNC: 2.1 MG/DL (ref 1.8–2.5)
MCH RBC QN AUTO: 34.5 PG (ref 28–33.2)
MCHC RBC AUTO-ENTMCNC: 32.7 G/DL (ref 32.2–36.5)
MCV RBC AUTO: 105.2 FL (ref 83–98)
METHGB BLD-SCNC: 0.9 % (ref 0.4–1.5)
PCO2 BLDA: 33 MM HG (ref 35–48)
PDW BLD AUTO: 12 FL (ref 9.4–12.4)
PH BLDA: 7.48 [PH] (ref 7.35–7.45)
PHOSPHATE SERPL-MCNC: 2 MG/DL (ref 2.4–4.7)
PLATELET # BLD AUTO: 88 K/UL (ref 150–350)
PO2 BLDA: 154 MM HG (ref 83–100)
POCT TEST: ABNORMAL
POTASSIUM BLDA-SCNC: 4.2 MEQ/L (ref 3.4–4.5)
POTASSIUM SERPL-SCNC: 4 MEQ/L (ref 3.6–5.1)
POTASSIUM SERPL-SCNC: 4 MEQ/L (ref 3.6–5.1)
PROT SERPL-MCNC: 5.1 G/DL (ref 6–8.2)
PROTHROMBIN TIME: 16.9 SEC (ref 12.2–14.5)
RBC # BLD AUTO: 3.25 M/UL (ref 4.5–5.8)
SAO2 % BLDA: 97 % (ref 93–98)
SODIUM BLDA-SCNC: 148 MEQ/L (ref 136–145)
SODIUM SERPL-SCNC: 149 MEQ/L (ref 136–144)
SODIUM SERPL-SCNC: 151 MEQ/L (ref 136–144)
WBC # BLD AUTO: 10.31 K/UL (ref 3.8–10.5)

## 2022-08-12 PROCEDURE — 85610 PROTHROMBIN TIME: CPT | Performed by: SURGERY

## 2022-08-12 PROCEDURE — 85027 COMPLETE CBC AUTOMATED: CPT | Performed by: SURGERY

## 2022-08-12 PROCEDURE — 25000000 HC PHARMACY GENERAL: Performed by: STUDENT IN AN ORGANIZED HEALTH CARE EDUCATION/TRAINING PROGRAM

## 2022-08-12 PROCEDURE — 83735 ASSAY OF MAGNESIUM: CPT | Performed by: SURGERY

## 2022-08-12 PROCEDURE — 20000000 HC ROOM AND CARE ICU

## 2022-08-12 PROCEDURE — 99222 1ST HOSP IP/OBS MODERATE 55: CPT | Performed by: INTERNAL MEDICINE

## 2022-08-12 PROCEDURE — 84100 ASSAY OF PHOSPHORUS: CPT | Performed by: SURGERY

## 2022-08-12 PROCEDURE — 99233 SBSQ HOSP IP/OBS HIGH 50: CPT | Performed by: INTERNAL MEDICINE

## 2022-08-12 PROCEDURE — 25000000 HC PHARMACY GENERAL: Performed by: SURGERY

## 2022-08-12 PROCEDURE — 27900059 OXYGEN DAILY

## 2022-08-12 PROCEDURE — 63600000 HC DRUGS/DETAIL CODE: Performed by: SURGERY

## 2022-08-12 PROCEDURE — 84132 ASSAY OF SERUM POTASSIUM: CPT | Performed by: STUDENT IN AN ORGANIZED HEALTH CARE EDUCATION/TRAINING PROGRAM

## 2022-08-12 PROCEDURE — 82330 ASSAY OF CALCIUM: CPT | Performed by: SURGERY

## 2022-08-12 PROCEDURE — 80048 BASIC METABOLIC PNL TOTAL CA: CPT | Performed by: STUDENT IN AN ORGANIZED HEALTH CARE EDUCATION/TRAINING PROGRAM

## 2022-08-12 PROCEDURE — 25800000 HC PHARMACY IV SOLUTIONS: Performed by: STUDENT IN AN ORGANIZED HEALTH CARE EDUCATION/TRAINING PROGRAM

## 2022-08-12 PROCEDURE — 36415 COLL VENOUS BLD VENIPUNCTURE: CPT | Performed by: SURGERY

## 2022-08-12 PROCEDURE — 71045 X-RAY EXAM CHEST 1 VIEW: CPT

## 2022-08-12 PROCEDURE — 99291 CRITICAL CARE FIRST HOUR: CPT | Mod: 25 | Performed by: INTERNAL MEDICINE

## 2022-08-12 PROCEDURE — 94003 VENT MGMT INPAT SUBQ DAY: CPT

## 2022-08-12 PROCEDURE — 80053 COMPREHEN METABOLIC PANEL: CPT | Performed by: STUDENT IN AN ORGANIZED HEALTH CARE EDUCATION/TRAINING PROGRAM

## 2022-08-12 PROCEDURE — 63600000 HC DRUGS/DETAIL CODE: Performed by: STUDENT IN AN ORGANIZED HEALTH CARE EDUCATION/TRAINING PROGRAM

## 2022-08-12 RX ADMIN — METOPROLOL TARTRATE 2.5 MG: 5 INJECTION, SOLUTION INTRAVENOUS at 08:15

## 2022-08-12 RX ADMIN — DEXMEDETOMIDINE 0.2 MCG/KG/HR: 200 INJECTION, SOLUTION INTRAVENOUS at 12:34

## 2022-08-12 RX ADMIN — COLLAGENASE SANTYL 1 APPLICATION.: 250 OINTMENT TOPICAL at 18:01

## 2022-08-12 RX ADMIN — METOPROLOL TARTRATE 2.5 MG: 5 INJECTION, SOLUTION INTRAVENOUS at 16:33

## 2022-08-12 RX ADMIN — CHLORHEXIDINE GLUCONATE 15 ML: 1.2 SOLUTION ORAL at 11:00

## 2022-08-12 RX ADMIN — METOPROLOL TARTRATE 2.5 MG: 5 INJECTION, SOLUTION INTRAVENOUS at 22:12

## 2022-08-12 RX ADMIN — CEFEPIME 2 G: 2 INJECTION, POWDER, FOR SOLUTION INTRAVENOUS at 23:25

## 2022-08-12 RX ADMIN — METOPROLOL TARTRATE 2.5 MG: 5 INJECTION, SOLUTION INTRAVENOUS at 02:07

## 2022-08-12 RX ADMIN — DAKIN'S SOLUTION 0.125% (QUARTER STRENGTH): 0.12 SOLUTION at 09:00

## 2022-08-12 RX ADMIN — CEFEPIME 2 G: 2 INJECTION, POWDER, FOR SOLUTION INTRAVENOUS at 12:45

## 2022-08-12 RX ADMIN — HEPARIN SODIUM 5000 UNITS: 5000 INJECTION, SOLUTION INTRAVENOUS; SUBCUTANEOUS at 16:33

## 2022-08-12 RX ADMIN — PANTOPRAZOLE SODIUM 40 MG: 40 INJECTION, POWDER, FOR SOLUTION INTRAVENOUS at 08:15

## 2022-08-12 RX ADMIN — POTASSIUM PHOSPHATE, MONOBASIC POTASSIUM PHOSPHATE, DIBASIC 15 MMOL: 224; 236 INJECTION, SOLUTION, CONCENTRATE INTRAVENOUS at 08:06

## 2022-08-12 RX ADMIN — HEPARIN SODIUM 5000 UNITS: 5000 INJECTION, SOLUTION INTRAVENOUS; SUBCUTANEOUS at 22:12

## 2022-08-12 RX ADMIN — HEPARIN SODIUM 5000 UNITS: 5000 INJECTION, SOLUTION INTRAVENOUS; SUBCUTANEOUS at 05:00

## 2022-08-12 NOTE — PROGRESS NOTES
SURGICAL CRITICAL CARE DAILY PROGRESS NOTE     PATIENT NAME:  Samy Elena YOB: 1930    AGE:  89 y.o.  GENDER: male   MRN:  [id number]  PATIENT #: [id number]     SUBJECTIVE     No acute acute events overnight. remains intubated in ICU.Tolerating tube feeds at goal.     Off of Levo.  PRVC 40%/450/20/6 overnight.  Tolerated SBT for 12 hours yesterday.    ID following. On Cefepime 2g Q12.  Today is day 10 of 14 planned.    We will continue to wean ventilation.  Goals of care meeting planned with his 4 sons today.    VITAL SIGNS     Temperature:   Temp (24hrs), Av.5 °C (99.5 °F), Min:37.5 °C (99.5 °F), Max:37.5 °C (99.5 °F)       Last 24 hours:    Temp:  [37.5 °C (99.5 °F)] 37.5 °C (99.5 °F)  Heart Rate:  [] 85  Resp:  [18-26] 20  BP: ()/(50-87) 101/52  FiO2 (%) (Set):  [40 %] 40 %    VENT SETTINGS:   Vent Mode: Pressure support  FiO2 (%) (Set):  [40 %] 40 %  S RR:  [20] 20  S VT:  [500 mL-659 mL] 500 mL  PEEP/CPAP (Set, cmH2O):  [6 cm H20] 6 cm H20  MAP (Observed, cmH2O):  [6-9] 8    INTAKE & OUTPUT     I/O last 3 completed shifts:  In: 5274.1 [I.V.:1102.1; NG/GT:3872; IV Piggyback:300]  Out: 3175 [Urine:2950; Stool:225]    Intake/Output Summary (Last 24 hours) at 2022 0817  Last data filed at 2022 0806  Gross per 24 hour   Intake 3638.48 ml   Output 2310 ml   Net 1328.48 ml     I/O this shift:  In: 375.7 [I.V.:10.7; NG/GT:115; IV Piggyback:250]  Out: 90 [Urine:90]     MEDICATIONS     Infusions:    • dexmedetomidine in 0.9 % NaCl  0.2-1.5 mcg/kg/hr 0.2 mcg/kg/hr (22 0812)   • insulin  0-15 Units/hr 4.725 Units/hr (22 0759)   • norepinephrine  0.5-90 mcg/min Stopped (22 6897)            Scheduled:   • cefepime  2 g intravenous q12h INT   • chlorhexidine  15 mL Mouth/Throat 2 times per day   • heparin (porcine)  5,000 Units subcutaneous q8h IVÁN   • metoprolol  2.5 mg intravenous q6h INT   • pantoprazole  40 mg intravenous q24h   • potassium phosphate  15  mmol intravenous Once   • sodium hypochlorite   Topical Daily       Antibiotics:  Anti-infectives (From admission, onward)    Start     Dose/Rate Route Frequency Ordered Stop    08/05/22 1200  ceFEPIme (MAXIPIME) 2 g in 100 mL NSS vial in bag         2 g  200 mL/hr over 30 Minutes intravenous Every 12 hours interval 08/05/22 1110            PRN:     acetaminophen, 650 mg, q4h PRN  glucose, 16-32 g of dextrose, PRN   Or  dextrose, 15-30 g of dextrose, PRN   Or  glucagon, 1 mg, PRN   Or  dextrose 50 % in water (D50), 25 mL, PRN  dextrose 50 % in water (D50), 10-30 mL, PRN  HYDROmorphone, 0.25 mg, q3h PRN         DIAGNOSTIC DATA     LABS:  Recent Results (from the past 24 hour(s))   POCT Glucose    Collection Time: 08/11/22  8:54 AM   Result Value Ref Range    POCT Bedside Glucose 134 (H) 70 - 99 mg/dL    POC Test POC    POCT Glucose    Collection Time: 08/11/22 10:03 AM   Result Value Ref Range    POCT Bedside Glucose 125 (H) 70 - 99 mg/dL    POC Test POC    POCT Glucose    Collection Time: 08/11/22 10:57 AM   Result Value Ref Range    POCT Bedside Glucose 177 (H) 70 - 99 mg/dL    POC Test POC    POCT Glucose    Collection Time: 08/11/22 12:03 PM   Result Value Ref Range    POCT Bedside Glucose 151 (H) 70 - 99 mg/dL    POC Test POC    POCT Glucose    Collection Time: 08/11/22  1:18 PM   Result Value Ref Range    POCT Bedside Glucose 165 (H) 70 - 99 mg/dL    POC Test POC    Blood Gas, arterial Comprehensive    Collection Time: 08/11/22  2:23 PM   Result Value Ref Range    pH, Arterial 7.47 (H) 7.35 - 7.45    pCO2, Arterial 33 (L) 35 - 48 mm Hg    pO2, Arterial 149 (H) 83 - 100 mm Hg    HCO3, Arterial 25.6 21.0 - 28.0 mEQ/L    Base Excess, Arterial 0.9 mEQ/L    O2 Sat, Arterial 97 93 - 98 %    TCO2, Arterial 25.0 22.0 - 32.0 mEQ/L    Lactate, Art 0.8 0.4 - 1.6 mmol/L    Glucose, Arterial 207 (H) 70 - 99 mg/dL    Sodium, Arterial 151 (H) 136 - 145 mEQ/L    Potassium, Arterial 4.4 3.4 - 4.5 mEQ/L    Chloride, Arterial 122  (H) 98 - 109 mEQ/L    Ionized Calcium, Arterial 1.22 1.15 - 1.27 mmol/L    Hemoglobin, Arterial 11.4 (L) 14.0 - 17.5 g/dL    Carboxyhemoglobin 1.5 0.0 - 3.0; (Smokers: 0.0 - 9.0) %    Methemoglobin 0.6 0.4 - 1.5 %    Source Of Oxygen PS 8/6     FIO2 40    POCT Glucose    Collection Time: 08/11/22  2:56 PM   Result Value Ref Range    POCT Bedside Glucose 188 (H) 70 - 99 mg/dL    POC Test POC    POCT Glucose    Collection Time: 08/11/22  3:58 PM   Result Value Ref Range    POCT Bedside Glucose 180 (H) 70 - 99 mg/dL    POC Test POC    POCT Glucose    Collection Time: 08/11/22  5:13 PM   Result Value Ref Range    POCT Bedside Glucose 155 (H) 70 - 99 mg/dL    POC Test POC    POCT Glucose    Collection Time: 08/11/22  6:16 PM   Result Value Ref Range    POCT Bedside Glucose 168 (H) 70 - 99 mg/dL    POC Test POC    POCT Glucose    Collection Time: 08/11/22  7:49 PM   Result Value Ref Range    POCT Bedside Glucose 153 (H) 70 - 99 mg/dL    POC Test POC    POCT Glucose    Collection Time: 08/11/22  9:56 PM   Result Value Ref Range    POCT Bedside Glucose 173 (H) 70 - 99 mg/dL    POC Test POC    Basic metabolic panel    Collection Time: 08/11/22 10:04 PM   Result Value Ref Range    Sodium 151 (H) 136 - 144 mEQ/L    Potassium 4.1 3.6 - 5.1 mEQ/L    Chloride 122 (H) 98 - 109 mEQ/L    CO2 26 22 - 32 mEQ/L    BUN 42 (H) 8 - 20 mg/dL    Creatinine 0.8 0.8 - 1.3 mg/dL    Glucose 200 (H) 70 - 99 mg/dL    Calcium 8.0 (L) 8.9 - 10.3 mg/dL    eGFR >60.0 >=60.0 mL/min/1.73m*2    Anion Gap 3 3 - 15 mEQ/L   POCT Glucose    Collection Time: 08/12/22 12:40 AM   Result Value Ref Range    POCT Bedside Glucose 207 (H) 70 - 99 mg/dL    POC Test POC    POCT Glucose    Collection Time: 08/12/22  1:57 AM   Result Value Ref Range    POCT Bedside Glucose 169 (H) 70 - 99 mg/dL    POC Test POC    POCT Glucose    Collection Time: 08/12/22  3:02 AM   Result Value Ref Range    POCT Bedside Glucose 156 (H) 70 - 99 mg/dL    POC Test POC    POCT Glucose     Collection Time: 08/12/22  3:53 AM   Result Value Ref Range    POCT Bedside Glucose 197 (H) 70 - 99 mg/dL    POC Test POC    POCT Glucose    Collection Time: 08/12/22  4:58 AM   Result Value Ref Range    POCT Bedside Glucose 168 (H) 70 - 99 mg/dL    POC Test POC    CBC    Collection Time: 08/12/22  5:09 AM   Result Value Ref Range    WBC 10.31 3.80 - 10.50 K/uL    RBC 3.25 (L) 4.50 - 5.80 M/uL    Hemoglobin 11.2 (L) 13.7 - 17.5 g/dL    Hematocrit 34.2 (L) 40.1 - 51.0 %    .2 (H) 83.0 - 98.0 fL    MCH 34.5 (H) 28.0 - 33.2 pg    MCHC 32.7 32.2 - 36.5 g/dL    RDW 15.4 (H) 11.6 - 14.4 %    Platelets 88 (L) 150 - 350 K/uL    MPV 12.0 9.4 - 12.4 fL   Magnesium    Collection Time: 08/12/22  5:09 AM   Result Value Ref Range    Magnesium 2.1 1.8 - 2.5 mg/dL   Phosphorus    Collection Time: 08/12/22  5:09 AM   Result Value Ref Range    Phosphorus 2.0 (L) 2.4 - 4.7 mg/dL   Protime-INR    Collection Time: 08/12/22  5:09 AM   Result Value Ref Range    PT 16.9 (H) 12.2 - 14.5 sec    INR 1.4     Calcium, ionized    Collection Time: 08/12/22  5:09 AM   Result Value Ref Range    Ionized Calcium, Venous 1.19 1.15 - 1.27 mmol/L   Comprehensive metabolic panel    Collection Time: 08/12/22  5:09 AM   Result Value Ref Range    Sodium 149 (H) 136 - 144 mEQ/L    Potassium 4.0 3.6 - 5.1 mEQ/L    Chloride 120 (H) 98 - 109 mEQ/L    CO2 25 22 - 32 mEQ/L    BUN 42 (H) 8 - 20 mg/dL    Creatinine 0.8 0.8 - 1.3 mg/dL    Glucose 197 (H) 70 - 99 mg/dL    Calcium 8.0 (L) 8.9 - 10.3 mg/dL    AST (SGOT) 67 (H) 15 - 41 IU/L    ALT (SGPT) 24 16 - 63 IU/L    Alkaline Phosphatase 84 35 - 126 IU/L    Total Protein 5.1 (L) 6.0 - 8.2 g/dL    Albumin 1.5 (L) 3.4 - 5.0 g/dL    Bilirubin, Total 0.9 0.3 - 1.2 mg/dL    eGFR >60.0 >=60.0 mL/min/1.73m*2    Anion Gap 4 3 - 15 mEQ/L   POCT Glucose    Collection Time: 08/12/22  6:07 AM   Result Value Ref Range    POCT Bedside Glucose 162 (H) 70 - 99 mg/dL    POC Test POC    POCT Glucose    Collection Time:  08/12/22  7:04 AM   Result Value Ref Range    POCT Bedside Glucose 201 (H) 70 - 99 mg/dL    POC Test POC    POCT Glucose    Collection Time: 08/12/22  7:59 AM   Result Value Ref Range    POCT Bedside Glucose 195 (H) 70 - 99 mg/dL    POC Test POC      Serum creatinine: 0.8 mg/dL 08/12/22 0509  Estimated creatinine clearance: 69.4 mL/min  Microbiology Results     Procedure Component Value Units Date/Time    MRSA Screen, Nares Only Nose [[id number]]  (Normal) Collected: 08/02/22 2248    Specimen: Nasal Swab from Nose Updated: 08/03/22 0030     MRSA DNA, Nares Negative    SARS-CoV-2 (COVID-19), PCR Nasopharynx [[id number]]  (Normal) Collected: 08/02/22 1633    Specimen: Nasopharyngeal Swab from Nasopharynx Updated: 08/02/22 1807    Narrative:      The following orders were created for panel order SARS-CoV-2 (COVID-19), PCR Nasopharynx.  Procedure                               Abnormality         Status                     ---------                               -----------         ------                     SARS-CoV-2 (COVID-19), P...[[id number]]  Normal              Final result                 Please view results for these tests on the individual orders.    SARS-CoV-2 (COVID-19), PCR Nasopharynx [[id number]]  (Normal) Collected: 08/02/22 1633    Specimen: Nasopharyngeal Swab from Nasopharynx Updated: 08/02/22 1807     SARS-CoV-2 (COVID-19) Negative    Narrative:      Testing performed using real-time PCR for detection of COVID-19. EUA approved validation studies performed on site.     Anaerobic Culture / Smear (includes Aerobic Culture) [[id number]] Collected: 08/02/22 1536    Specimen: Abscess Fluid from Buttock, Left Updated: 08/02/22 2247     Gram Stain Result 3+ WBC      2+ Gram positive cocci      2+ Gram positive bacilli    SARS-CoV-2 (COVID-19), PCR Nasopharynx [[id number]]  (Normal) Collected: 07/25/22 1341    Specimen: Nasopharyngeal Swab from Nasopharynx Updated: 07/25/22 1525    Narrative:      The following  orders were created for panel order SARS-CoV-2 (COVID-19), PCR Nasopharynx.  Procedure                               Abnormality         Status                     ---------                               -----------         ------                     SARS-CoV-2 (COVID-19), P...[[id number]]  Normal              Final result                 Please view results for these tests on the individual orders.    SARS-CoV-2 (COVID-19), PCR Nasopharynx [[id number]]  (Normal) Collected: 07/25/22 1341    Specimen: Nasopharyngeal Swab from Nasopharynx Updated: 07/25/22 1525     SARS-CoV-2 (COVID-19) Negative    SARS-CoV-2 (COVID-19), PCR Nasopharynx [[id number]]  (Normal) Collected: 07/22/22 2001    Specimen: Nasopharyngeal Swab from Nasopharynx Updated: 07/22/22 2125    Narrative:      The following orders were created for panel order SARS-CoV-2 (COVID-19), PCR Nasopharynx.  Procedure                               Abnormality         Status                     ---------                               -----------         ------                     SARS-CoV-2 (COVID-19), P...[[id number]]  Normal              Final result                 Please view results for these tests on the individual orders.    SARS-CoV-2 (COVID-19), PCR Nasopharynx [[id number]]  (Normal) Collected: 07/22/22 2001    Specimen: Nasopharyngeal Swab from Nasopharynx Updated: 07/22/22 2125     SARS-CoV-2 (COVID-19) Negative    Narrative:      Testing performed using real-time PCR for detection of COVID-19. EUA approved validation studies performed on site.           IMAGING:  No results found.    Radiology Imaging    XR CHEST 1 VW    Narrative  CLINICAL HISTORY: Ataxia.    --    Impression  Findings suspicious for congestive heart failure.    COMMENT: AP radiograph of the chest was obtained.  We have no prior similar  studies for comparison.  There is cardiomegaly.  There is vascular  congestion/interstitial edema.  There are bilateral effusions.  Findings  suggest  congestive heart failure.  Sternal sutures are seen.  There is no pneumothorax.  We have no prior similar studies for comparison.       Lines, Drains, Airways, Wounds:     CVC Triple Lumen 08/02/22 Internal jugular (Active)   Number of days: 1       Peripheral IV (Adult) 07/22/22 Left Forearm (Active)   Number of days: 12       Peripheral IV (Adult) 08/02/22 Right Forearm (Active)   Number of days: 1       Peripheral IV (Adult) 08/02/22 Anterior;Left;Proximal Forearm (Active)   Number of days: 1       NG/OG Tube 08/02/22 Center mouth (Active)   Number of days: 1       Urethral Catheter Temperature probe 16 Fr (Active)   Number of days: 1       ETT  (Active)   Number of days: 1       Arterial Line 08/02/22 Left Radial (Active)   Number of days: 1       Surgical Incision Buttock (Active)   Number of days: 1       PHYSICAL EXAM     General appearance: Intubated and minimally sedated.   Head: normocephalic, without obvious abnormality, atraumatic. ETT in place.   Eyes: negative, no scleral icterus.   Nose: no discharge  Neck: no JVD, symmetrical, trachea midline.    RIJ CVC in place. Left neck with swan odalis catheter in place  Lungs: Mechanical ventilation.   Chest wall: No deformities or palpable masses  Heart: Regular rate and rhythm.   Abdomen: soft, moderately distended, non-tender.   Ostomy pink, protruding, perfused, productive of gas and stool.  Genitalia: edematous, erythematous. No streaking or crepitous.    Incisions clean, dry, intact. Erythema stable.   Extremities: extremities warm and well-perfused.   Right A-Line in place. Good waveform.   Pulses: 2+ and symmetric.  Skin: Skin color, texture, turgor normal.  Neurologic: Unable to assess secondary to sedation.       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 male HD#10 s/p EUA, wound debridement for necrotizing soft tissue infection of the perirectal and perineal tissue on 8/2. 8 Days Post-Op      NEURO: Intubated & sedated. On Precedex 0.4.  Dilaudid as needed.  Fentanyl off. Follows commands.     CV: aFib. HR 70-100s. Off of Levo. CUFF PRESSURES for pressor titration. Metop 2.5mg Q6.  EF 40-45% (8/3)     PULM: ETT. 20/500/40%/6. SBT daily.  Tolerated for 12 hours yesterday.    GI:  NPO. PPI. LUQ end-colostomy. Productive gas & stool. Tube feeds  at goal.  Free water flushes at 75 cc every 2 hours.    : Fc. Cr: 1.0 (1.1). >1.0cc/kg/hr UOP. Genitalia erythematous. Daily dressing changes.    HEME: HgB of 12.6. plts 88 this am.    ID: WBC: 10.3 (16) Pancultures from 8/8 pending. NSTI. Cefipime 14da (until 8/16). OR Cx: 2+ E. Coli, 2+ streptoccus anginous, 2+ enterobacter. Sputum w/ 4+ yeast. BC from 8/8 w/ NG48.    ENDO: Insulin ggt. -180 last 24hrs.     DVT PPX: SCDs & sq heparin TID    GI PPX:  PPI    DISPOSITION:  SICU.     Please page [phone] or [phone] with any questions or concerns.  Connor Evans MD  8/12/2022  8:17 AM

## 2022-08-12 NOTE — PLAN OF CARE
Plan of Care Review  Plan of Care Reviewed With: patient, family  Progress: improving  Outcome Summary: VSS, remains off levo. Tmax 100.4- team aware. R IJ CVC removed, PICC line ok to use per order. No other acute events overnight. Plan for family meeting today @ 1pm.

## 2022-08-12 NOTE — CONSULTS
PATIENTS: Samy Elena  YOB: 1930  DATE: August 12, 2022  VISIT TYPE: consultation    History of Present Illness:   Samy Elena is a 89 year old man on whom we are consulted for management of hyperglycemia.    The patient himself offers only a  limited history.  History is obtained from review of chart records and discussion with family members and primary team.    He has a medical history notable for atrial fibrillation, hypertension, and ischemic cardiomyopathy.  He had a recent hospitalization at [facility] late July where he was evaluated for possible CVA and for orthostatic hypotension.  He was transferred to the [facility] and readmitted shortly thereafter after he developed pubic area pain.    He was recognized to have necrotizing fasciitis which was occurring in the context of SGLT2 inhibitor use (Jardiance) for heart failure- stared on May 2022.  Reportedly he had no known history of diabetes.  His A1c prior to his initial July admission was 6.3%.    He proceeded to the OR shortly after admission for debridement.  He was then admitted to the surgical ICU where he was intubated and had transient pressor requirements.  He was later started on tube feeds.    His team made the decision to proceed with extubation today after he experienced clinical improvement.  I reviewed notes from the primary team and from palliative care indicating that his family opted against reintubation if  the extubation proved to be unsuccessful.    He had high insulin requirements while he was on tube feeds.  His tube feeds have been discontinued at least for the short-term in the context of recent extubation.      His insulin requirements on 8/11 were previously as high as 4.72 units/h.  Most recently his insulin drip rate has been lowered to 1.77 units/h reflecting very recently discontinuation of TF.  His most recent blood glucose was 178.          Medical History:  Past Medical History:   Diagnosis Date    • Atrial fibrillation (CMS/HCC)    • Disease of thyroid gland    • GI (gastrointestinal bleed)    • Hypertension    • Myocardial infarction (CMS/HCC)        Surgical History:   Past Surgical History:   Procedure Laterality Date   • BYPASS GRAFT         Family History:  History reviewed. No pertinent family history.    Social history:  Social History     Socioeconomic History   • Marital status:      Spouse name: Not on file   • Number of children: Not on file   • Years of education: Not on file   • Highest education level: Not on file   Occupational History   • Not on file   Tobacco Use   • Smoking status: Never Smoker   • Smokeless tobacco: Never Used   Substance and Sexual Activity   • Alcohol use: Not on file   • Drug use: Not on file   • Sexual activity: Not on file   Other Topics Concern   • Not on file   Social History Narrative   • Not on file     Social Determinants of Health     Financial Resource Strain: Not on file   Food Insecurity: No Food Insecurity   • Worried About Running Out of Food in the Last Year: Never true   • Ran Out of Food in the Last Year: Never true   Transportation Needs: Not on file   Physical Activity: Not on file   Stress: Not on file   Social Connections: Not on file   Intimate Partner Violence: Not on file   Housing Stability: Not on file       Medication(active prior to today):  Current Facility-Administered Medications   Medication Dose Route Frequency Provider Last Rate Last Admin   • acetaminophen (TYLENOL) 650 mg/20.3 mL solution 650 mg  650 mg oral q4h PRN Avril Chester MD   650 mg at 08/08/22 2152   • ceFEPIme (MAXIPIME) 2 g in 100 mL NSS vial in bag  2 g intravenous q12h INT Connor Evans MD   Stopped at 08/12/22 1315   • chlorhexidine (PERIDEX) 0.12 % mouthwash 15 mL  15 mL Mouth/Throat 2 times per day Samy Haddad DO   15 mL at 08/12/22 1100   • dexmedeTOMIDine HCl (PRECEDEX) 400 mcg in dextrose 5 % 100 mL (4 mcg/mL) infusion  0.2-1.5 mcg/kg/hr  intravenous Titrated Connor Evans MD   Stopped at 08/12/22 1401   • glucose chewable tablet 16-32 g of dextrose  16-32 g of dextrose oral PRN Samy Haddad DO        Or   • dextrose 40 % oral gel 15-30 g of dextrose  15-30 g of dextrose oral PRN Samy Haddad DO        Or   • glucagon (GLUCAGEN) injection 1 mg  1 mg intramuscular PRN Samy Haddad DO        Or   • dextrose 50 % in water (D50) injection 12.5 g  25 mL intravenous PRN Samy Haddad DO       • insulin regular U-100 (HumuLIN R,NovoLIN R) 100 Units in sodium chloride 0.9 % 100 mL (1 Units/mL) infusion  0-15 Units/hr intravenous Titrated Connor Evans MD 1.77 mL/hr at 08/12/22 1609 1.77 Units/hr at 08/12/22 1609    And   • dextrose 50 % in water (D50) injection 5-15 g  10-30 mL intravenous PRN Connor Evans MD       • heparin (porcine) 5,000 unit/mL injection 5,000 Units  5,000 Units subcutaneous q8h IVÁN Samy Haddad DO   5,000 Units at 08/12/22 1633   • HYDROmorphone (DILAUDID) injection 0.25 mg  0.25 mg intravenous q3h PRN Connor Evans MD       • metoprolol (LOPRESSOR) injection 2.5 mg  2.5 mg intravenous q6h INT Samy Haddad DO   2.5 mg at 08/12/22 1633   • norepinephrine (LEVOPHED) 4 mg in dextrose 5 % 250 mL (0.016 mg/mL) infusion  0.5-90 mcg/min intravenous Titrated Connor Evans MD   Stopped at 08/11/22 1834   • pantoprazole (PROTONIX) injection 40 mg  40 mg intravenous q24h Samy Haddad DO   40 mg at 08/12/22 0815   • sodium hypochlorite (DAKIN'S (QUARTER-STRENGTH)) 0.125 % external solution   Topical Daily Samy Haddad DO   Given by Other at 08/12/22 0900       Allergies:  Jardiance [empagliflozin]    Review of Systems:   Unable to obtain from patient      Vitals Signs:  Vitals:    08/12/22 1300 08/12/22 1400 08/12/22 1600 08/12/22 1633   BP: 122/66 117/60  (!) 117/55   BP Location:       Patient Position:       Pulse: 86 91 (!) 103 82   Resp:   (!) 23    Temp:       TempSrc:        SpO2: 100% 100% 99%    Weight:       Height:         Body mass index is 23.35 kg/m².      Physical Exam:  Constitutional: Patient is comfortable appearing awake and alert  Eyes: EOM are normal. No scleral icterus  Neck: Normal range of motion. Neck supple.   Cardiovascular: distant HS, irreg  Pulmonary/Chest: Effort normal and breath sounds normal.   Abdominal: Soft.   Musculoskeletal: Normal range of motion.   Neurological: Appears to be moving all extremities   skin: Skin is warm and dry.   Psychiatric: Alert and cooperative;  he has difficulty hearing    Labs:  Lab Results   Component Value Date    WBC 10.31 08/12/2022    HGB 11.2 (L) 08/12/2022    HCT 34.2 (L) 08/12/2022    PLT 88 (L) 08/12/2022    CHOL 94 07/23/2022    TRIG 41 07/23/2022    HDL 23 (L) 07/23/2022    ALT 24 08/12/2022    AST 67 (H) 08/12/2022     (H) 08/12/2022    K 4.0 08/12/2022     (H) 08/12/2022    CREATININE 0.8 08/12/2022    BUN 42 (H) 08/12/2022    CO2 25 08/12/2022    TSH 4.72 08/01/2022    INR 1.4 08/12/2022    HGBA1C 6.3 (H) 07/23/2022       Assessment/Plan:    Hyperglycemia -   89-year-old man currently seen in the ICU with a recent presentation of necrotizing fasciitis related to SGLT2 inhibitor use.    He likely had only very mild dysglycemia prior to admission in the setting of an A1c of 6.3 despite Jardiance use.    He had transiently higher insulin requirements in the context of infection, pressor use and tube feeds.    At present, he is clinically improved and tube feeds have been stopped.    He remains on IV insulin.    Can continue IV insulin for the short-term.  However I anticipate that very soon his insulin requirements will diminish.  If he remains off tube feeds and IV insulin requirements trend below 1 unit/h, would favor discontinuation of IV insulin and monitoring of blood glucoses with use of a correction bolus scale short acting insulin.    Please keep our service informed of changes in feeding  strategies as this will  play a role in his insulin management.     2) necrotizing fasciitis  This occurred in the context of SGLT2 inhibitor use for heart failure  Jardiance has been discontinued.  He should not receive any further treatment with medications in this class going forward.    3)  History of CHF - mgt per cardiology service              Electronically signed by: Liliya Baker MD on 8/12/2022 4:51 PM

## 2022-08-12 NOTE — PROGRESS NOTES
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    Debility  Family meeting held with SICU team and 3 sons. See Dr. Potts's note for summary. They have a good understanding of the current situation and our options. They feel that their father would not be willing to undergo trach/PEG and have therefore elected to pursue trial of extubation with the understanding that if it worsens we will convert to comfort care and allow natural death. He is now DNR/DNI.    VDRF  - if converting to comfort care after extubation: fentanyl 50 mcg q10 min prn PCA, add basal based on PRN usage, midazolam 2 mg q30 min prn, glyco 0.2 mg q4hr prn, comfort care order set      Franc Givens MD        Subjective    Patient remains mechanically ventilated. Tolerating PS. Not responding to voice at time of exam but reportedly more alert earlier.     Review of Systems:  Unable to provide due to AMS    Objective    Current Medications:  •  acetaminophen, 650 mg, oral, q4h PRN  •  cefepime, 2 g, intravenous, q12h INT  •  chlorhexidine, 15 mL, Mouth/Throat, 2 times per day  •  dexmedetomidine in 0.9 % NaCl, 0.2-1.5 mcg/kg/hr, intravenous, Titrated  •  glucose, 16-32 g of dextrose, oral, PRN **OR** dextrose, 15-30 g of dextrose, oral, PRN **OR** glucagon, 1 mg, intramuscular, PRN **OR** dextrose 50 % in water (D50), 25 mL, intravenous, PRN  •  insulin, 0-15 Units/hr, intravenous, Titrated **AND** dextrose 50 % in water (D50), 10-30 mL, intravenous, PRN  •  heparin (porcine), 5,000 Units, subcutaneous, q8h IVÁN  •  HYDROmorphone, 0.25 mg, intravenous, q3h PRN  •  metoprolol, 2.5 mg, intravenous, q6h INT  •  norepinephrine, 0.5-90 mcg/min, intravenous, Titrated  •  pantoprazole, 40 mg, intravenous, q24h  •  sodium hypochlorite, , Topical, Daily    Palliative Performance Score: 10    Physical Exam:  Physical Exam  Constitutional:       Appearance: He is ill-appearing.   HENT:      Head: Atraumatic.       Mouth/Throat:      Comments: ETT  Neck:      Vascular: No JVD.   Cardiovascular:      Rate and Rhythm: Tachycardia present.   Pulmonary:      Comments: MV  Abdominal:      General: There is no distension.   Musculoskeletal:      Comments: sarcopenia   Skin:     Findings: No rash.   Neurological:      Comments: Not responding to voice   Psychiatric:      Comments: No agitation           Vitals:  Vitals:    08/12/22 1600   BP:    Pulse: (!) 103   Resp: (!) 23   Temp:    SpO2: 99%       Laboratory Studies:    CBC Results       08/12/22 08/11/22 08/10/22     0509 0455 0631    WBC 10.31 16.52 19.34    RBC 3.25 3.45 3.57    HGB 11.2 11.7 12.6    HCT 34.2 36.7 39.3    .2 106.4 110.1    MCH 34.5 33.9 35.3    MCHC 32.7 31.9 32.1    PLT 88 98 89         Comment for PLT at 0509 on 08/12/22: SPECIMEN QUALITY CHECKED    Comment for PLT at 0631 on 08/10/22: CONSISTENT WITH PREVIOUS RESULTS        CMP Results       08/12/22 08/11/22 08/11/22     0509 2204 0455     151 154    K 4.0 4.1 4.2    Cl 120 122 123    CO2 25 26 27    Glucose 197 200 168    BUN 42 42 47    Creatinine 0.8 0.8 0.9    Calcium 8.0 8.0 8.1    Anion Gap 4 3 4    AST 67 -- 44    ALT 24 -- 17    Albumin 1.5 -- 1.7    EGFR >60.0 >60.0 >60.0            Labs reviewed. Notable for cr 0.8    Imaging and Other Studies:     Radiology Imaging    XR CHEST 1 VW    Narrative  CLINICAL HISTORY: intubated      COMMENT: A single portable radiograph of the chest was obtained on 8/12/2022 at  0621 hours.    Comparison: Chest x-ray August 11, 2022.    Bibasilar effusions. Right PICC, ET tube, NG tube, cardiac silhouette, median  sternotomy, and osseous structures are stable.    --    Impression  Overall, no significant interval change.                                                                          Imaging/cardiac studies notable for: median sternotomy on CXR

## 2022-08-12 NOTE — PROGRESS NOTES
Family meeting with palliative care and 3 of the patient's sons (who were all wonderful). We explained to them his improvement over the course of the week and how he was now hemodynamically stable and without organ failure other than ongoing respiratory failure. I explained that we are nearing the point of him being ready for extubation but that I have real concerns given his age and frailty. I explained that the best way to ensure his maximum longevity/survival (ignoring any questions of goal of care) would be to perform tracheostomy tube placement with feeding tube placement without trial of extubation.  His sons were clear that their father would not want that. I then offered them trial of extubation with transition to comfort measures if fails versus trial of extubation with re-intubation and tracheostomy tube placement if fails versus trial of extubation with re-intubation and reconsideration of plan of care if fails.  They have elected to pursue trial of extubation with transition to comfort measures if he fails. All questions were answered.

## 2022-08-12 NOTE — PROGRESS NOTES
Cardiology Progress Note   [facility]    [facility]  [facility] [address]  [address] [place] [address]    TEL  [phone]  [facility]       Patient: Samy Elena  MRN: [id number]  : 10/6/1930  Admission Date: 2022   Consult Date: 22  Reason for Consult: H/o HFrEF, PH, atrial fibrillation -postop management   Outpatient Cardiologist: Dr. Yovanny Cruz ( office visit 2022)     Interval History:   Seen and examined early this am.    Intubated. On a wean     Off pressors  Plan for family meeting this pm     HPI:   Samy Elena is a 89 y.o. 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    Cardiology is being consulted for management recommendations given his extensive cardiac history.     Her chart review, the patient had a recent admission at the end of July with changes in speech. CVA was ruled out at the time of that admission .symptoms was attributed to  postural/orthostatic changes related to symptomatic hypotension. Patient was discharged to rehabilitation unit where he developed  buttocks and pubic pain.He was referred to be seen by surgery as outpatient that lead to current admission.       Past Medical History:   Diagnosis Date   • Atrial fibrillation (CMS/HCC)    • Disease of thyroid gland    • GI (gastrointestinal bleed)    • Hypertension    • Myocardial infarction (CMS/HCC)           Past Surgical History:   Procedure Laterality Date   • BYPASS GRAFT         Social History     Socioeconomic History   • Marital status:      Spouse name: Not on file   • Number of children: Not on file   • Years of education: Not on file   • Highest education level: Not on file   Occupational History   • Not on file   Tobacco Use   •  Smoking status: Never Smoker   • Smokeless tobacco: Never Used   Substance and Sexual Activity   • Alcohol use: Not on file   • Drug use: Not on file   • Sexual activity: Not on file   Other Topics Concern   • Not on file   Social History Narrative   • Not on file     Social Determinants of Health     Financial Resource Strain: Not on file   Food Insecurity: No Food Insecurity   • Worried About Running Out of Food in the Last Year: Never true   • Ran Out of Food in the Last Year: Never true   Transportation Needs: Not on file   Physical Activity: Not on file   Stress: Not on file   Social Connections: Not on file   Intimate Partner Violence: Not on file   Housing Stability: Not on file        History reviewed. No pertinent family history.     Jardiance [empagliflozin]    Current Outpatient Medications   Medication Instructions   • acetaminophen (TYLENOL) 325 mg tablet oral   • bisacodyL (DULCOLAX) 10 mg suppository rectal   • clopidogreL (PLAVIX) 75 mg, oral, Daily   • coenzyme Q10 (COQ10) 100 mg, oral, Daily   • empagliflozin (JARDIANCE) 10 mg, oral, Daily   • levothyroxine (SYNTHROID) 100 mcg, oral, Daily (6:30a)   • metoprolol succinate XL (TOPROL-XL) 100 mg, oral, Daily   • multivitamin (THERAGRAN) tablet oral, Daily   • sacubitriL-valsartan (ENTRESTO) 49-51 mg per tablet 1 tablet, oral, 2 times daily   • sildenafiL (pulm.hypertension) (REVATIO) 20 mg, oral, 3 times daily   • simvastatin (ZOCOR) 10 mg, oral, Nightly   • spironolactone (ALDACTONE) 25 mg, oral, Daily   • torsemide (DEMADEX) 10-20 mg, oral, Daily, 10 mg if weight is 176-181 lbs, 20 mg if weight is >181 lbs       Scheduled Meds:  • cefepime  2 g intravenous q12h INT   • chlorhexidine  15 mL Mouth/Throat 2 times per day   • heparin (porcine)  5,000 Units subcutaneous q8h IVÁN   • metoprolol  2.5 mg intravenous q6h INT   • pantoprazole  40 mg intravenous q24h   • sodium hypochlorite   Topical Daily     Continuous Infusions:  • dexmedetomidine in 0.9 %  "NaCl  0.2-1.5 mcg/kg/hr 0.2 mcg/kg/hr (08/12/22 0900)   • insulin  0-15 Units/hr 1.42 Units/hr (08/12/22 1139)   • norepinephrine  0.5-90 mcg/min Stopped (08/11/22 1834)     PRN Meds:.•  acetaminophen  •  glucose **OR** dextrose **OR** glucagon **OR** dextrose 50 % in water (D50)  •  insulin **AND** dextrose 50 % in water (D50)  •  HYDROmorphone      Intake/Output Summary (Last 24 hours) at 8/12/2022 1151  Last data filed at 8/12/2022 0915  Gross per 24 hour   Intake 3404.48 ml   Output 2180 ml   Net 1224.48 ml        Weights (last 7 days)     None           Wt Readings from Last 3 Encounters:   08/03/22 80.3 kg (177 lb)   08/02/22 80.3 kg (177 lb)   07/25/22 81.1 kg (178 lb 11.2 oz)        REVIEW OF SYSTEMS:    A complete review of systems limited by sedation.    PHYSICAL EXAMINATION:  Visit Vitals  BP (!) 115/59   Pulse 83   Temp 37.5 °C (99.5 °F) (Bladder)   Resp 20   Ht 1.854 m (6' 1\")   Wt 80.3 kg (177 lb)   SpO2 100%   BMI 23.35 kg/m²     Body surface area is 2.03 meters squared.  Body mass index is 23.35 kg/m².  General: intubated and sedated   HEENT: No corneal arcus or xanthelasmas.  Sclerae are anicteric.   Neck: CVC on the right.   Heart: s1s2 audible, no significant murmurs , irregular   Chest: Symmetrical.  Lungs: intubated. Breathing sounds audible bl .  Abdomen: soft, nt   Extremities: No cyanosis or clubbing.no lower extremity edema.  Distal pulses are easily palpable.  Skin: Warm and dry and well perfused.  Neurologic:  na  Psychiatric:na    Labs   Results from last 7 days   Lab Units 08/12/22  0509 08/11/22  2204 08/11/22  0455 08/10/22  1401 08/10/22  0631   SODIUM mEQ/L 149* 151* 154*   < > 154*   POTASSIUM mEQ/L 4.0 4.1 4.2   < > 4.5   CHLORIDE mEQ/L 120* 122* 123*   < > 121*   CO2 mEQ/L 25 26 27   < > 27   BUN mg/dL 42* 42* 47*   < > 54*   CREATININE mg/dL 0.8 0.8 0.9   < > 0.9   CALCIUM mg/dL 8.0* 8.0* 8.1*   < > 8.3*   ALBUMIN g/dL 1.5*  --  1.7*  --  1.8*   BILIRUBIN TOTAL mg/dL 0.9  --  1.2 "  --  1.3*   ALK PHOS IU/L 84  --  73  --  76   ALT IU/L 24  --  17  --  17   AST IU/L 67*  --  44*  --  41   GLUCOSE mg/dL 197* 200* 168*   < > 137*    < > = values in this interval not displayed.       No results found for: HSTROPONINI    Results from last 7 days   Lab Units 08/12/22  0509 08/11/22  0455 08/10/22  0631   WBC K/uL 10.31 16.52* 19.34*   HEMOGLOBIN g/dL 11.2* 11.7* 12.6*   HEMATOCRIT % 34.2* 36.7* 39.3*   PLATELETS K/uL 88* 98* 89*       Lab Results   Component Value Date    CHOL 94 07/23/2022    HDL 23 (L) 07/23/2022    LDLCALC 63 07/23/2022    TRIG 41 07/23/2022    TSH 4.72 08/01/2022    HGBA1C 6.3 (H) 07/23/2022       Outside records reviewed..    Imaging  CXR reviewed     Cardiac Studies  TTE 04/13/2022 ( outside report , images not available)     •  A complete transthoracic echocardiogram (including 2D, color flow   Doppler, spectral Doppler and M-mode imaging) was performed using the   standard protocol. The study quality was adequate and poor.  •  The left ventricle is normal in size. Endocardium is incompletely   visualized and segmental wall motion abnormalities cannot be excluded.   Moderately decreased left ventricular ejection fraction. The left   ventricular ejection fraction by visual estimate is 35% to 40%.  •  There is indeterminate diastolic function.  •  The right ventricle is severely dilated. There is moderately to   severely decreased systolic function of the right ventricle.  •  The left atrium is moderately dilated.  •  The right atrium is severely dilated.  •  There is trivial mitral valve leaflet thickening. There is trace mitral   regurgitation. There is no mitral stenosis.  •  The aortic valve is trileaflet. There is mild aortic valve thickening.   There is mild aortic valve calcification. There is no aortic stenosis.   There is mild aortic regurgitation.  •  There is moderate to severe tricuspid regurgitation.  •  The inferior vena cava is dilated (diameter >21 mm) and  decreases <50%   in size with inspiration, suggesting an elevated right atrial pressure of   15 mmHg (range 10-20 mm Hg).  •  There is no prior study available for comparison at this organization.    Left Ventricle  The left ventricle is normal in size. Endocardium is incompletely visualized and segmental wall motion abnormalities cannot be excluded. The left ventricular ejection fraction by visual estimate is 35% to 40%. Moderately decreased left ventricular ejection fraction. There is indeterminate diastolic function.    Right Ventricle  The right ventricle is severely dilated. Off axis imaging of the right ventricle, but visually systolic function appears moderate to severely reduced.    Left Atrium  The left atrium is moderately dilated.    Right Atrium  The right atrium is severely dilated.    IVC/SVC  The inferior vena cava is dilated (diameter >21 mm) and decreases <50% in size with inspiration, suggesting an elevated right atrial pressure of 15 mmHg (range 10-20 mm Hg).    Mitral Valve  There is trivial mitral valve leaflet thickening. There is no mitral stenosis. There is trace mitral regurgitation.    Tricuspid Valve  There is moderate tricuspid annular dilatation. There is no tricuspid stenosis. There is moderate to severe tricuspid regurgitation.    Aortic Valve  The aortic valve is trileaflet. There is mild aortic valve thickening. There is mild aortic valve calcification. There is no aortic stenosis. There is mild aortic regurgitation.    Pulmonic Valve  There is no pulmonic stenosis. There is mild pulmonic valve regurgitation.    Pericardium  There are echocardiographic findings consistent with fat pad.    Aortic Arch/Descending/Abdominal Aorta  The aortic root is normal in size. The proximal segment of the ascending aorta is normal in size.     ECG   07/22/2022   Atrial fibrillation   rbbb   twi on precordial leads, present on ecg 07/2022     Telemetry  Atrial fibrillation , rbbb with ventricular  response in 80-90    Assessment:  This is a 89 y.o. male being consulted for cardiovascular management    #Justin's gangrene:   #Septic shock:     Management per primary service   On abx; id has been following   empagliflozin should be discontinued at the time of discharge. He was recently started on this agent in may 2022      # iCM:  # HFrEF:  # PH on sildenafil as outpatient:    NYHA class III and he has ACC/AHA Stage C as outpatient previously     On norepinephrin 12 following hydromorphone and now weaning back down     His lvef appears 40-45% with severe RV dilation and diastolic dysfunction.     He has been improving overall from septic shock standpoint   Off vasopressors   Central line is out   Strict intake and output   Continue holding entresto spironolactone and sildenafil  We will be following along as needed pending C discussion with family      # Permanent atrial fibrillation:     [id number]     Rates in the 80-90    Not on AC as outpatient given history of GIB   Change metoprolol IV to metoprolol tartrate 12.5 mg bid through ogt to avoid bp drops with iv formulation.     Case  discussed with Dr. Davila. Final recommendations are pending attending co-signature. Please page Cardiology at [phone] with any questions.     Sabiha Cordon MD  8/12/2022

## 2022-08-12 NOTE — PATIENT CARE CONFERENCE
Care Progression Rounds Note  Date: 8/12/2022  Time: 8:29 AM     Patient Name: Samy Elena     Medical Record Number: [id number]   YOB: 1930  Sex: Male      Room/Bed: [facility]    Admitting Diagnosis: Necrotizing soft tissue infection [M79.89]   Admit Date/Time: 8/2/2022  4:13 PM    Primary Diagnosis: Palliative care by specialist  Principal Problem: Palliative care by specialist    GMLOS: 9.6  Anticipated Discharge Date: 8/14/2022    AM-PAC:  Mobility Score:      Discharge Planning:  Concerns to be Addressed: care coordination/care conferences, discharge planning  Anticipated Discharge Disposition: other (see comments)    Barriers to Discharge:  Medical issues not resolved    Comments:       Participants:  advanced practice provider, , physical therapy, physician, social work/services

## 2022-08-12 NOTE — PLAN OF CARE
Problem: Adult Inpatient Plan of Care  Goal: Plan of Care Review  Outcome: Progressing  Flowsheets (Taken 8/12/2022 0903)  Progress: improving  Plan of Care Reviewed With: other (see comments)     Per care progression rounds, patient off of Levo with minimal sedation. Tolerated SBT. Possible extubation today. Plan for family meeting today at 1 PM. YAHIR and Dispo TBD. CM to continue to follow for dispo planning.... Orquidea Herndon, [phone]

## 2022-08-13 ENCOUNTER — APPOINTMENT (INPATIENT)
Dept: RADIOLOGY | Facility: HOSPITAL | Age: 87
DRG: 717 | End: 2022-08-13
Attending: STUDENT IN AN ORGANIZED HEALTH CARE EDUCATION/TRAINING PROGRAM
Payer: MEDICARE

## 2022-08-13 LAB
ALBUMIN SERPL-MCNC: 1.7 G/DL (ref 3.4–5)
ALP SERPL-CCNC: 96 IU/L (ref 35–126)
ALT SERPL-CCNC: 35 IU/L (ref 16–63)
ANION GAP SERPL CALC-SCNC: 6 MEQ/L (ref 3–15)
ANION GAP SERPL CALC-SCNC: 7 MEQ/L (ref 3–15)
AST SERPL-CCNC: 99 IU/L (ref 15–41)
BACTERIA BLD CULT: NORMAL
BACTERIA BLD CULT: NORMAL
BILIRUB SERPL-MCNC: 1.1 MG/DL (ref 0.3–1.2)
BUN SERPL-MCNC: 41 MG/DL (ref 8–20)
BUN SERPL-MCNC: 43 MG/DL (ref 8–20)
CA-I BLD-SCNC: 1.18 MMOL/L (ref 1.15–1.27)
CALCIUM SERPL-MCNC: 7.9 MG/DL (ref 8.9–10.3)
CALCIUM SERPL-MCNC: 8.1 MG/DL (ref 8.9–10.3)
CHLORIDE SERPL-SCNC: 119 MEQ/L (ref 98–109)
CHLORIDE SERPL-SCNC: 121 MEQ/L (ref 98–109)
CO2 SERPL-SCNC: 22 MEQ/L (ref 22–32)
CO2 SERPL-SCNC: 23 MEQ/L (ref 22–32)
CREAT SERPL-MCNC: 0.9 MG/DL (ref 0.8–1.3)
CREAT SERPL-MCNC: 0.9 MG/DL (ref 0.8–1.3)
ERYTHROCYTE [DISTWIDTH] IN BLOOD BY AUTOMATED COUNT: 15.9 % (ref 11.6–14.4)
GFR SERPL CREATININE-BSD FRML MDRD: >60 ML/MIN/1.73M*2
GFR SERPL CREATININE-BSD FRML MDRD: >60 ML/MIN/1.73M*2
GLUCOSE BLD-MCNC: 117 MG/DL (ref 70–99)
GLUCOSE BLD-MCNC: 126 MG/DL (ref 70–99)
GLUCOSE SERPL-MCNC: 153 MG/DL (ref 70–99)
GLUCOSE SERPL-MCNC: 168 MG/DL (ref 70–99)
HCT VFR BLDCO AUTO: 35.6 % (ref 40.1–51)
HGB BLD-MCNC: 11.6 G/DL (ref 13.7–17.5)
INR PPP: 1.4
MAGNESIUM SERPL-MCNC: 2.2 MG/DL (ref 1.8–2.5)
MCH RBC QN AUTO: 35 PG (ref 28–33.2)
MCHC RBC AUTO-ENTMCNC: 32.6 G/DL (ref 32.2–36.5)
MCV RBC AUTO: 107.6 FL (ref 83–98)
PDW BLD AUTO: 11.7 FL (ref 9.4–12.4)
PHOSPHATE SERPL-MCNC: 3.3 MG/DL (ref 2.4–4.7)
PLATELET # BLD AUTO: 93 K/UL (ref 150–350)
POCT TEST: ABNORMAL
POCT TEST: ABNORMAL
POTASSIUM SERPL-SCNC: 4.2 MEQ/L (ref 3.6–5.1)
POTASSIUM SERPL-SCNC: 4.2 MEQ/L (ref 3.6–5.1)
PROT SERPL-MCNC: 5.6 G/DL (ref 6–8.2)
PROTHROMBIN TIME: 16.7 SEC (ref 12.2–14.5)
RBC # BLD AUTO: 3.31 M/UL (ref 4.5–5.8)
SODIUM SERPL-SCNC: 148 MEQ/L (ref 136–144)
SODIUM SERPL-SCNC: 150 MEQ/L (ref 136–144)
WBC # BLD AUTO: 10.26 K/UL (ref 3.8–10.5)

## 2022-08-13 PROCEDURE — 20000000 HC ROOM AND CARE ICU

## 2022-08-13 PROCEDURE — 36415 COLL VENOUS BLD VENIPUNCTURE: CPT | Performed by: SURGERY

## 2022-08-13 PROCEDURE — 63600000 HC DRUGS/DETAIL CODE: Performed by: SURGERY

## 2022-08-13 PROCEDURE — 63700000 HC SELF-ADMINISTRABLE DRUG: Performed by: STUDENT IN AN ORGANIZED HEALTH CARE EDUCATION/TRAINING PROGRAM

## 2022-08-13 PROCEDURE — 25000000 HC PHARMACY GENERAL: Performed by: STUDENT IN AN ORGANIZED HEALTH CARE EDUCATION/TRAINING PROGRAM

## 2022-08-13 PROCEDURE — 85610 PROTHROMBIN TIME: CPT | Performed by: STUDENT IN AN ORGANIZED HEALTH CARE EDUCATION/TRAINING PROGRAM

## 2022-08-13 PROCEDURE — 80053 COMPREHEN METABOLIC PANEL: CPT | Performed by: STUDENT IN AN ORGANIZED HEALTH CARE EDUCATION/TRAINING PROGRAM

## 2022-08-13 PROCEDURE — 84100 ASSAY OF PHOSPHORUS: CPT | Performed by: STUDENT IN AN ORGANIZED HEALTH CARE EDUCATION/TRAINING PROGRAM

## 2022-08-13 PROCEDURE — 71045 X-RAY EXAM CHEST 1 VIEW: CPT

## 2022-08-13 PROCEDURE — 63600000 HC DRUGS/DETAIL CODE: Performed by: STUDENT IN AN ORGANIZED HEALTH CARE EDUCATION/TRAINING PROGRAM

## 2022-08-13 PROCEDURE — 92610 EVALUATE SWALLOWING FUNCTION: CPT | Mod: GN

## 2022-08-13 PROCEDURE — 80048 BASIC METABOLIC PNL TOTAL CA: CPT | Performed by: STUDENT IN AN ORGANIZED HEALTH CARE EDUCATION/TRAINING PROGRAM

## 2022-08-13 PROCEDURE — 99232 SBSQ HOSP IP/OBS MODERATE 35: CPT | Performed by: INTERNAL MEDICINE

## 2022-08-13 PROCEDURE — 25000000 HC PHARMACY GENERAL: Performed by: SURGERY

## 2022-08-13 PROCEDURE — 85027 COMPLETE CBC AUTOMATED: CPT | Performed by: STUDENT IN AN ORGANIZED HEALTH CARE EDUCATION/TRAINING PROGRAM

## 2022-08-13 PROCEDURE — 25800000 HC PHARMACY IV SOLUTIONS: Performed by: STUDENT IN AN ORGANIZED HEALTH CARE EDUCATION/TRAINING PROGRAM

## 2022-08-13 PROCEDURE — 27900059 OXYGEN DAILY

## 2022-08-13 PROCEDURE — 82330 ASSAY OF CALCIUM: CPT | Performed by: SURGERY

## 2022-08-13 PROCEDURE — 83735 ASSAY OF MAGNESIUM: CPT | Performed by: STUDENT IN AN ORGANIZED HEALTH CARE EDUCATION/TRAINING PROGRAM

## 2022-08-13 RX ORDER — METOPROLOL TARTRATE 1 MG/ML
5 INJECTION, SOLUTION INTRAVENOUS
Status: DISCONTINUED | OUTPATIENT
Start: 2022-08-13 | End: 2022-08-14

## 2022-08-13 RX ADMIN — DAKIN'S SOLUTION 0.125% (QUARTER STRENGTH): 0.12 SOLUTION at 09:15

## 2022-08-13 RX ADMIN — HEPARIN SODIUM 5000 UNITS: 5000 INJECTION, SOLUTION INTRAVENOUS; SUBCUTANEOUS at 05:47

## 2022-08-13 RX ADMIN — METOPROLOL TARTRATE 2.5 MG: 5 INJECTION, SOLUTION INTRAVENOUS at 04:41

## 2022-08-13 RX ADMIN — HEPARIN SODIUM 5000 UNITS: 5000 INJECTION, SOLUTION INTRAVENOUS; SUBCUTANEOUS at 14:15

## 2022-08-13 RX ADMIN — METOPROLOL TARTRATE 5 MG: 1 INJECTION, SOLUTION INTRAVENOUS at 18:03

## 2022-08-13 RX ADMIN — HEPARIN SODIUM 5000 UNITS: 5000 INJECTION, SOLUTION INTRAVENOUS; SUBCUTANEOUS at 22:44

## 2022-08-13 RX ADMIN — METOPROLOL TARTRATE 2.5 MG: 5 INJECTION, SOLUTION INTRAVENOUS at 12:11

## 2022-08-13 RX ADMIN — COLLAGENASE SANTYL 1 APPLICATION.: 250 OINTMENT TOPICAL at 09:54

## 2022-08-13 RX ADMIN — PANTOPRAZOLE SODIUM 40 MG: 40 INJECTION, POWDER, FOR SOLUTION INTRAVENOUS at 09:14

## 2022-08-13 RX ADMIN — CEFEPIME 2 G: 2 INJECTION, POWDER, FOR SOLUTION INTRAVENOUS at 12:11

## 2022-08-13 ASSESSMENT — COGNITIVE AND FUNCTIONAL STATUS - GENERAL
FOLLOWS FAMILIAR CONVERSATION: 2 - A LOT
TAKING CARE OF COMPLICATED TASKS: 1 - UNABLE
REMEMBERING 5 ERRANDS WITH NO LIST: 1 - UNABLE
AFFECT: LOW AROUSAL/LETHARGIC;CONFUSED
UNDERSTANDING 10 TO 15 MIN SPEECH: 1 - UNABLE
REMEMBERING WHERE THINGS ARE: 1 - UNABLE
REMEMBERING TO TAKE MEDICATION: 1 - UNABLE

## 2022-08-13 NOTE — PLAN OF CARE
Problem: Adult Inpatient Plan of Care  Goal: Plan of Care Review  Outcome: Progressing  Flowsheets (Taken 8/13/2022 1306)  Progress: improving  Plan of Care Reviewed With: patient  Outcome Summary: ST IE completed, POC initiated. See note for recs. SLP to follow-up.  Goal: Patient-Specific Goal (Individualized)  Outcome: Progressing     Problem: Swallowing Impairment  Goal: Improved Swallowing Without Aspiration  Outcome: Progressing

## 2022-08-13 NOTE — PROGRESS NOTES
Patient:  Samy Elena  Location:  [facility] SICU SICU12  MRN:  [id number]  Today's date:  8/13/2022  SPEECH PATHOLOGY EVALUATION:     SLP Diagnosis: At least moderate oral stage and suspected pharyngeal stage dysphagia, dysphonia status post ETT 8/3 date/12 dysarthria, suspected cognitive communication, language deficits    Recommendations:  1. Strict NPO, consider short-term alternative source for medication and nutrition at this time  2. Strict aspiration precautions including frequent and stringent oral care with suction PRN.  3. Could consider neurology consult in setting of c/f facial asymmetry, dysarthria without clear etiology.   4. SLP for ongoing dysphagia follow-up.     Summary/Impressions:  Daily Outcome Statement: Dysphagia initial evaluation completed at the bedside.  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  Patient was admitted to the hospital intubated 8/3 for OR for emergent debridement.  Patient remained intubated from 8/3-8/12.  Patient currently received in bed, lethargic, requiring heated high flow NC 50L, SsO571%.  Patient with lean to the left, slight facial asymmetry noted with left side, RN and medical team made aware.  Patient with significant dysphonia, aphonic at times.  Patient observed with significant dysarthria-Per RN, patient had dysarthria prior to admission per family (unknown etiology?).  Patient observed with single ice chips showing anterior spillage on the left side, significantly delayed initiation of swallow requiring max cues.  At this time, recommend strict n.p.o., consider short-term alternative source for nutrition and medication.  The patient remains at high risk for aspiration in setting of prolonged intubation, high oxygen demands.  Speech therapy for ongoing follow-up.    Chief Complaint   Patient presents with   • Wound Infection     Clinical Course: This 89 y.o. male was admitted  8/2/2022 with Necrotizing soft tissue infection [M79.89].     Reason for Consult: ST was consulted to assess the pt's swallow function s/p extubation     Pertinent Radiology Studies: not all imaging is included in this eval-please image section.   8/13/2022 chest x-ray:   IMPRESSION: Bilateral effusions.  8/2/2022 chest x-ray:  Cardiac monitoring leads obscure portions of the underlying lungs. An  endotracheal tube is in place with its tip in satisfactory position. A  right-sided internal jugular central venous catheter is in place with its tip at  the expected location of the cavoatrial junction. An enteric catheter is in  place with its tip at the expected location of the GE junction. At the time of  interpretation, a subsequent x-ray demonstrating adequate placement has been  performed. The cardiac silhouette is enlarged. Sternotomy wires and mediastinal  clips are present. Aortic atherosclerosis is noted. Right greater than left  pleural effusions are present with adjacent airspace disease and interstitial  prominence. Surgical material is noted in the left upper quadrant of the abdomen  and surgical clips are noted in the right upper quadrant of the abdomen.  7/23/2022 MRI brain without contrast, MRI angiogram head without contrast, MRI angiogram neck without contrast: IMPRESSION:  1. No acute intracranial abnormality, no acute infarct.  2. Mild microangiopathic changes.  3. 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  4. Unremarkable MRA of the neck.  GRBAS:   Grade 3   Roughness 3   Breathiness 3   Asthenia 3   Strain 1       Past Medical History:   Diagnosis Date   • Atrial fibrillation (CMS/HCC)    • Disease of thyroid gland    • GI (gastrointestinal bleed)    • Hypertension    • Myocardial infarction (CMS/HCC)      Past Surgical History:   Procedure Laterality Date   • BYPASS GRAFT         Allergies    Allergen Reactions   • Jardiance [Empagliflozin] Other (see comments)     denise's gangrene         Results from last 7 days   Lab Units 08/13/22  0555 08/12/22  0509 08/11/22  0455   WBC K/uL 10.26 10.31 16.52*   HEMOGLOBIN g/dL 11.6* 11.2* 11.7*   HEMATOCRIT % 35.6* 34.2* 36.7*   PLATELETS K/uL 93* 88* 98*          Baseline Diet/Method of Nutritional Intake: no diet restrictions  Current Diet: (See below)            RN cleared SLP to assess/work with patient as no medical issues identified to preclude this therapy session. Following completion of session, pt remained in bed as they were found with call bell in reach.  Nurse was made aware of patients performance and any changes in status (if applicable).      Samy is a 89 y.o. 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    Past Medical History  Samy has a past medical history of Atrial fibrillation (CMS/HCC), Disease of thyroid gland, GI (gastrointestinal bleed), Hypertension, and Myocardial infarction (CMS/HCC).    History of Present Illness   Admit from OP MD's office 8/2 for denise's gangrene. He proceeded to the OR shortly after admission (8/3) for debridement.  He was then admitted to the surgical ICU where he was intubated and had transient pressor requirements. ETT 8/3-8/12, extubated HHFNC 50L, FbT878%. NPO      SLP Pain    Date/Time Pain Type Rating: Rest Rating: Activity [facility]   08/13/22 1026 Pain Assessment 0 - no pain 0 - no pain KAYLENE            Prior Level of Function    Flowsheet Row Most Recent Value   Eating independent   Communication difficulty speaking (not related to language barrier)  [per RN, pt's famiyl reports dysarthria PTA]   Swallowing difficulty swallowing foods   Baseline Diet/Method of Nutritional Intake no diet restrictions   Past History of Dysphagia Pt with no hx of dysphagia prior to admission   Assistive Device Currently Used at Home cane,  straight, walker, front-wheeled           SLP Evaluation and Treatment - 08/13/22 1026        SLP Time Calculation    Start Time 1026     Stop Time 1045     Time Calculation (min) 19 min        Session Details    Document Type initial evaluation     Mode of Treatment speech language pathology;individual therapy        General Information    Patient Profile Reviewed yes     General Observations of Patient Pt received in bed, leaning to the left side     Existing Precautions/Restrictions aspiration;fall;NPO;oxygen therapy device and L/min   HHFNC 50L, FiO2 40%    Limitations/Impairments safety/cognitive;swallowing        Cognition/Psychosocial    Affect/Mental Status (Cognition) low arousal/lethargic;confused     Behavioral Issues (Cognition) withdrawn     Orientation Status (Cognition) unable/difficult to assess   difficult to assess d/t dysarthria and dysphonia    Follows Commands (Cognition) follows one-step commands;50-74% accuracy;delayed response/completion;increased processing time needed     Comment, Cognition Difficult to assess d/t dysarthria, dysphonia. Inconsistent ability to follow commands. Further assessment is warranted, suspect lethargy to be exacerbating deficits        Dentition (Oral Motor)    Dentition (Oral Motor) adequate dentition        Facial Symmetry (Oral Motor)    Facial Symmetry (Oral Motor) left side impairment     Comment, Facial Symmetry (Oral Motor) pt leaning to left side, appears sligth L-sided assymetry?        Lip Function (Oral Motor)    Lip Range of Motion (Oral Motor) retraction impairment     Comment, Lip Function (Oral Motor) pt leaning to left side, with cued smile-slight L-sided asymmetry        Tongue Function (Oral Motor)    Tongue ROM (Oral Motor) protrusion is impaired        Jaw Function (Oral Motor)    Jaw Function (Oral Motor) range of motion impairment        Cough/Swallow/Gag Reflex (Oral Motor)    Soft Palate/Velum (Oral Motor) unable/difficult to assess      Volitional Throat Clear/Cough (Oral Motor) unable to produce     Volitional Swallow (Oral Motor) unable/difficult to assess        Vocal Quality/Secretion Management (Oral Motor)    Vocal Quality (Oral Motor) hoarse;hypophonic;dysphonic     Comment, Vocal Quality/Secretion Management (Oral Motor) s/p ETT 8/3-8/12        Motor Speech    Speech Intelligibility (Motor Speech) word level;phrase/sentence level     Word Level, Speech Intelligibility (Motor Speech) impaired;moderate impairment     Phrase/Sentence Level, Speech Intelligibility (Motor Speech) impaired;moderate impairment;severe impairment     Articulation (Motor Speech) imprecise articulation     Vocal Loudness (Motor Speech) reduced loudness     Comment, Motor Speech Assessment Difficult for SLP (unfamiliar listener) to understand pt. Per RN-family reports dysarthria PTA.        Auditory Comprehension    Follows Commands (Auditory Comprehension) 1-step command     1 Step, Follows Commands (Auditory Comprehension) 50-74% accuracy     Comment, Assessment (Auditory Comprehension) Pt is able to follow simple commands- models required at times, benefits from repetitions as needed. Further assessment warranted-suspect lethargy to be compounding deficits        Verbal Expression    Comment, Assessment (Verbal Expression) Assessment limited by pt's dysarthria, hypophonia. RN should anticipate the pt's wants and needs, suspect lethargy to be exacerbating deficits        Functional Communication Measures    FCM: Motor Speech 3-->Level 3     FCM: Spoken Language, Comprehension 4-->Level 4     FCM: Swallowing 1-->Level 1        General Swallowing Observations    Current Diet/Method of Nutritional Intake NPO     Signs/Symptoms of Aspiration (Current Diet) concerns for silent aspiration     Respiratory Support (General Swallowing Observations) nasal cannula;supplemental oxygen   HHFNC 50L, FiO2 40%    Comment, Secretions/Suctioning Oral care completed prior to PO trials      Comment, General Swallowing Observations Pt assessed in bed, oral care completed-PO trials of single ice chips completed        Food and Liquid Trials (NIS)    Patient Positioning HOB elevated (specify degrees)     Oral Intake/Feeding Performance oral trials administered by therapist     Liquid Consistencies Evaluated thin liquids     Thin Liquids use of teaspoon     Comment, Thin Liquids ice chips-prolonged mastication, anterior spillage of liquid left side. significantly delayed initiation of swallow requiring max cues     Food Consistencies Evaluated other (see comments)   deferred ongoing trials d/t c/f silent aspiration s/p prolonged extibation (significant dysphonia )    Oral Preparatory Phase of Swallow moderate impairment;mouth opening ROM decreased;mouth closure around utensil/cup decreased;lip seal impaired;bolus cohesion decreased;loss of bolus/oral leakage, left side;oral holding of bolus;suspect posterior bolus leak     Oral Phase of Swallow moderate impairment;tongue movement and elevation reduced;delayed anterior-posterior transit;suspect posterior bolus leak     Pharyngeal Phase of Swallow delayed swallow reflex initiation;uncoordinated swallow     Comment, Pharyngeal Phase max cues required at times to initiate swallow     Esophageal Phase of Swallow no clinical symptoms     Comment Pt remains at risk for silent aspiration in setting of prolonged intubation with subsequent significant dysphonia, as well as high oxygen requirements. Prognosis for acute return to PO intake is guarded at this time, could consider short-term alt. source for nutrition/meds if within pt's GOC        Swallowing Recommendations    Diet Consistency Recommendations NPO     Medication Administration non-oral route     Instrumental Assessment Recommendations reassess via non-instrumental clinical swallow evaluation     Comment, Swallowing Recommendations Strict aspiration precautions including frequent and stringent oral  care with suction PRN        Swallowing Intervention    Dysphagia/Swallowing Interventions monitor tolerance of;advanced diet/liquid texture trials        AM-PAC (TM) - Cognition (Current Function)    Following/understanding a 10-15 minute speech or presentation? 1 - Unable     Understanding familiar people during ordinary conversations? 2 - A lot     Remembering to take medications at the appropriate time? 1 - Unable     Remembering where things were placed or put away? 1 - Unable     Remembering a list of 3 or 4 errands without writing it down? 1 - Unable     Taking care of complicated tasks? 1 - Unable     AM-PAC (TM) Cognition Score 7        SLP Goals    Swallowing Goal Selection oral nutrition/hydration, SLP Goal 1        Assessment/Plan (SLP)    Daily Outcome Statement Dysphagia initial evaluation completed at the bedside.  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  Patient was admitted to the hospital intubated 8/3 for OR for emergent debridement.  Patient remained intubated from 8/3-8/12.  Patient currently received in bed, lethargic, requiring heated high flow NC 50L, VsF797%.  Patient with lean to the left, slight facial asymmetry noted with left side, RN and medical team made aware.  Patient with significant dysphonia, aphonic at times.  Patient observed with significant dysarthria-Per RN, patient had dysarthria prior to admission per family (unknown etiology?).  Patient observed with single ice chips showing anterior spillage on the left side, significantly delayed initiation of swallow requiring max cues.  At this time, recommend strict n.p.o., consider short-term alternative source for nutrition and medication.  The patient remains at high risk for aspiration in setting of prolonged intubation, high oxygen demands.  Speech therapy for ongoing follow-up.    Rehab Potential fair, will monitor progress closely     Therapy Frequency 5  times/wk     Planned Therapy Interventions dysphagia therapy;voice therapy;speech therapy               SLP Assessment/Plan    Flowsheet Row Most Recent Value   SLP Diagnosis At least moderate oral stage and suspected pharyngeal stage dysphagia, dysphonia status post ETT 8/3 date/12 dysarthria, suspected cognitive communication, language deficits at 08/13/2022 1026   Patient/Family Therapy Goal Statement none stated at 08/13/2022 1026               Education Documentation  Signs/Symptoms, taught by Candace Feldman CCC-SLP at 8/13/2022  1:10 PM.  Learner: Patient  Readiness: Acceptance  Method: Explanation  Response: No Evidence of Learning  Comment: re: aspiration risks and precautions    Risk Factors, taught by Candace Feldman CCC-SLP at 8/13/2022  1:10 PM.  Learner: Patient  Readiness: Acceptance  Method: Explanation  Response: No Evidence of Learning  Comment: re: aspiration risks and precautions          SLP Goals    Flowsheet Row Most Recent Value   Oral Nutrition/Hydration Goal 1    Activity effective/safe/independent, oral nutrition/hydration, use of swallowing techniques, management of texture/viscosity at 08/13/2022 1026   Time Frame long-term goal (LTG), by discharge at 08/13/2022 1026   Strategies/Barriers ETT x9 days, high oxygen demands at 08/13/2022 1026

## 2022-08-13 NOTE — PROGRESS NOTES
Endocrinology Progress Note       SUBJECTIVE   Interval History:    Patient was extubated yesterday. Cant talk much appears tired.  No TF now. Waiting for speech eval.      OBJECTIVE      Vital signs in last 24 hours:  Heart Rate:  [] 92  Resp:  [16-23] 18  BP: (103-167)/(52-68) 114/57  FiO2 (%) (Set):  [40 %] 40 %      PHYSICAL EXAMINATION        Constitutional: well-developed. well- nourished. Appears lethargic  Eyes: Conjunctivae and EOM are normal. PERRL  Neck: Normal range of motion. Neck supple.    Neurological: Alert and awake      LABS / IMAGING / TELE      Labs    Lab Results   Component Value Date    GLUCOSE 153 (H) 08/13/2022    CALCIUM 7.9 (L) 08/13/2022     (H) 08/13/2022    K 4.2 08/13/2022    CO2 23 08/13/2022     (H) 08/13/2022    BUN 41 (H) 08/13/2022    CREATININE 0.9 08/13/2022     Lab Results   Component Value Date    HGBA1C 6.3 (H) 07/23/2022     Lab Results   Component Value Date    CHOL 94 07/23/2022     Lab Results   Component Value Date    HDL 23 (L) 07/23/2022     Lab Results   Component Value Date    LDLCALC 63 07/23/2022     Lab Results   Component Value Date    TRIG 41 07/23/2022     No results found for: CHOLHDL  Lab Results   Component Value Date    ALT 35 08/13/2022    AST 99 (H) 08/13/2022    ALKPHOS 96 08/13/2022    BILITOT 1.1 08/13/2022     Lab Results   Component Value Date    TSH 4.72 08/01/2022       ASSESSMENT AND PLAN      1 History of preDM  - evidenced with A1c 6.3. on Jardiance 10 mg PTA  - required insulin with TF.     - now off TF. Waiting for speech eval.   - will monitor BG. Keep novolog ss if needed.   - pls notify service about nutrition plan.      2 necrotizing fasciitis  This occurred in the context of SGLT2 inhibitor use for heart failure  Jardiance has been discontinued.  He should not receive any further treatment with medications in this class going forward.    3 Hypothyroidism  - good TSH. No LT4.     4, History of CHF - mgt per cardiology  service    Discussed with his nurse.     Thank you very much for allowing me participating in this patient's care. Please feel free to contact me if any questions.          Madonna Dent MD

## 2022-08-13 NOTE — PROGRESS NOTES
SURGICAL CRITICAL CARE DAILY PROGRESS NOTE     PATIENT NAME:  Samy Elena YOB: 1930    AGE:  89 y.o.  GENDER: male   MRN:  [id number]  PATIENT #: [id number]     SUBJECTIVE     No acute acute events overnight. Extubated yesterday. Made DNR/DNI, however per notes yesterday family would like to continue supportive measures but no intubation. For now patients seems to be doing well on HHFNC    VITAL SIGNS     Temperature:   Temp (24hrs), Av.5 °C (99.5 °F), Min:37.5 °C (99.5 °F), Max:37.5 °C (99.5 °F)       Last 24 hours:    Temp:  [37.5 °C (99.5 °F)] 37.5 °C (99.5 °F)  Heart Rate:  [] 90  Resp:  [16-26] 20  BP: ()/(49-68) 116/57  FiO2 (%) (Set):  [40 %] 40 %    VENT SETTINGS:   Vent Mode: Pressure support  FiO2 (%) (Set):  [40 %] 40 %  S RR:  [20] 20  S VT:  [500 mL] 500 mL  PEEP/CPAP (Set, cmH2O):  [6 cm H20] 6 cm H20  MAP (Observed, cmH2O):  [6-8] 8    INTAKE & OUTPUT     I/O last 3 completed shifts:  In: 5414.5 [I.V.:807.5; NG/GT:4057; IV Piggyback:550]  Out: 3635 [Urine:3035; Stool:600]    Intake/Output Summary (Last 24 hours) at 2022 0239  Last data filed at 2022 0200  Gross per 24 hour   Intake 2173.64 ml   Output 2115 ml   Net 58.64 ml     I/O this shift:  In: 102.6 [I.V.:2.6; IV Piggyback:100]  Out: 450 [Urine:450]     MEDICATIONS     Infusions:    • dexmedetomidine in 0.9 % NaCl  0.2-1.5 mcg/kg/hr Stopped (22 1401)   • norepinephrine  0.5-90 mcg/min Stopped (22 1834)            Scheduled:   • cefepime  2 g intravenous q12h INT   • heparin (porcine)  5,000 Units subcutaneous q8h IVÁN   • insulin regular U-100  3-5 Units subcutaneous Nightly   • metoprolol  2.5 mg intravenous q6h INT   • pantoprazole  40 mg intravenous q24h   • sodium hypochlorite   Topical Daily       Antibiotics:  Anti-infectives (From admission, onward)    Start     Dose/Rate Route Frequency Ordered Stop    22 1200  ceFEPIme (MAXIPIME) 2 g in 100 mL NSS vial in bag         2  g  200 mL/hr over 30 Minutes intravenous Every 12 hours interval 08/05/22 1110            PRN:     acetaminophen, 650 mg, q4h PRN  glucose, 16-32 g of dextrose, PRN   Or  dextrose, 15-30 g of dextrose, PRN   Or  glucagon, 1 mg, PRN   Or  dextrose 50 % in water (D50), 25 mL, PRN  HYDROmorphone, 0.25 mg, q3h PRN         DIAGNOSTIC DATA     LABS:  Recent Results (from the past 24 hour(s))   POCT Glucose    Collection Time: 08/12/22  3:02 AM   Result Value Ref Range    POCT Bedside Glucose 156 (H) 70 - 99 mg/dL    POC Test POC    POCT Glucose    Collection Time: 08/12/22  3:53 AM   Result Value Ref Range    POCT Bedside Glucose 197 (H) 70 - 99 mg/dL    POC Test POC    POCT Glucose    Collection Time: 08/12/22  4:58 AM   Result Value Ref Range    POCT Bedside Glucose 168 (H) 70 - 99 mg/dL    POC Test POC    CBC    Collection Time: 08/12/22  5:09 AM   Result Value Ref Range    WBC 10.31 3.80 - 10.50 K/uL    RBC 3.25 (L) 4.50 - 5.80 M/uL    Hemoglobin 11.2 (L) 13.7 - 17.5 g/dL    Hematocrit 34.2 (L) 40.1 - 51.0 %    .2 (H) 83.0 - 98.0 fL    MCH 34.5 (H) 28.0 - 33.2 pg    MCHC 32.7 32.2 - 36.5 g/dL    RDW 15.4 (H) 11.6 - 14.4 %    Platelets 88 (L) 150 - 350 K/uL    MPV 12.0 9.4 - 12.4 fL   Magnesium    Collection Time: 08/12/22  5:09 AM   Result Value Ref Range    Magnesium 2.1 1.8 - 2.5 mg/dL   Phosphorus    Collection Time: 08/12/22  5:09 AM   Result Value Ref Range    Phosphorus 2.0 (L) 2.4 - 4.7 mg/dL   Protime-INR    Collection Time: 08/12/22  5:09 AM   Result Value Ref Range    PT 16.9 (H) 12.2 - 14.5 sec    INR 1.4     Calcium, ionized    Collection Time: 08/12/22  5:09 AM   Result Value Ref Range    Ionized Calcium, Venous 1.19 1.15 - 1.27 mmol/L   Comprehensive metabolic panel    Collection Time: 08/12/22  5:09 AM   Result Value Ref Range    Sodium 149 (H) 136 - 144 mEQ/L    Potassium 4.0 3.6 - 5.1 mEQ/L    Chloride 120 (H) 98 - 109 mEQ/L    CO2 25 22 - 32 mEQ/L    BUN 42 (H) 8 - 20 mg/dL    Creatinine 0.8  0.8 - 1.3 mg/dL    Glucose 197 (H) 70 - 99 mg/dL    Calcium 8.0 (L) 8.9 - 10.3 mg/dL    AST (SGOT) 67 (H) 15 - 41 IU/L    ALT (SGPT) 24 16 - 63 IU/L    Alkaline Phosphatase 84 35 - 126 IU/L    Total Protein 5.1 (L) 6.0 - 8.2 g/dL    Albumin 1.5 (L) 3.4 - 5.0 g/dL    Bilirubin, Total 0.9 0.3 - 1.2 mg/dL    eGFR >60.0 >=60.0 mL/min/1.73m*2    Anion Gap 4 3 - 15 mEQ/L   POCT Glucose    Collection Time: 08/12/22  6:07 AM   Result Value Ref Range    POCT Bedside Glucose 162 (H) 70 - 99 mg/dL    POC Test POC    POCT Glucose    Collection Time: 08/12/22  7:04 AM   Result Value Ref Range    POCT Bedside Glucose 201 (H) 70 - 99 mg/dL    POC Test POC    POCT Glucose    Collection Time: 08/12/22  7:59 AM   Result Value Ref Range    POCT Bedside Glucose 195 (H) 70 - 99 mg/dL    POC Test POC    POCT Glucose    Collection Time: 08/12/22  9:26 AM   Result Value Ref Range    POCT Bedside Glucose 129 (H) 70 - 99 mg/dL    POC Test POC    POCT Glucose    Collection Time: 08/12/22 10:30 AM   Result Value Ref Range    POCT Bedside Glucose 132 (H) 70 - 99 mg/dL    POC Test POC    Blood Gas, arterial Comprehensive    Collection Time: 08/12/22 11:05 AM   Result Value Ref Range    pH, Arterial 7.48 (H) 7.35 - 7.45    pCO2, Arterial 33 (L) 35 - 48 mm Hg    pO2, Arterial 154 (H) 83 - 100 mm Hg    HCO3, Arterial 26.2 21.0 - 28.0 mEQ/L    Base Excess, Arterial 1.6 mEQ/L    O2 Sat, Arterial 97 93 - 98 %    TCO2, Arterial 25.6 22.0 - 32.0 mEQ/L    Lactate, Art 0.9 0.4 - 1.6 mmol/L    Glucose, Arterial 159 (H) 70 - 99 mg/dL    Sodium, Arterial 148 (H) 136 - 145 mEQ/L    Potassium, Arterial 4.2 3.4 - 4.5 mEQ/L    Chloride, Arterial 120 (H) 98 - 109 mEQ/L    Ionized Calcium, Arterial 1.28 (H) 1.15 - 1.27 mmol/L    Hemoglobin, Arterial 11.4 (L) 14.0 - 17.5 g/dL    Carboxyhemoglobin 1.6 0.0 - 3.0; (Smokers: 0.0 - 9.0) %    Methemoglobin 0.9 0.4 - 1.5 %    Source Of Oxygen Vent     FIO2 40%    POCT Glucose    Collection Time: 08/12/22 11:39 AM   Result  Value Ref Range    POCT Bedside Glucose 131 (H) 70 - 99 mg/dL    POC Test POC    POCT Glucose    Collection Time: 08/12/22 12:31 PM   Result Value Ref Range    POCT Bedside Glucose 129 (H) 70 - 99 mg/dL    POC Test POC    POCT Glucose    Collection Time: 08/12/22  1:58 PM   Result Value Ref Range    POCT Bedside Glucose 154 (H) 70 - 99 mg/dL    POC Test POC    POCT Glucose    Collection Time: 08/12/22  2:59 PM   Result Value Ref Range    POCT Bedside Glucose 173 (H) 70 - 99 mg/dL    POC Test POC    POCT Glucose    Collection Time: 08/12/22  4:09 PM   Result Value Ref Range    POCT Bedside Glucose 178 (H) 70 - 99 mg/dL    POC Test POC    POCT Glucose    Collection Time: 08/12/22  6:05 PM   Result Value Ref Range    POCT Bedside Glucose 144 (H) 70 - 99 mg/dL    POC Test POC    POCT Glucose    Collection Time: 08/12/22  7:57 PM   Result Value Ref Range    POCT Bedside Glucose 131 (H) 70 - 99 mg/dL    POC Test POC    Basic metabolic panel    Collection Time: 08/12/22 10:20 PM   Result Value Ref Range    Sodium 151 (H) 136 - 144 mEQ/L    Potassium 4.0 3.6 - 5.1 mEQ/L    Chloride 120 (H) 98 - 109 mEQ/L    CO2 25 22 - 32 mEQ/L    BUN 39 (H) 8 - 20 mg/dL    Creatinine 0.7 (L) 0.8 - 1.3 mg/dL    Glucose 131 (H) 70 - 99 mg/dL    Calcium 8.0 (L) 8.9 - 10.3 mg/dL    eGFR >60.0 >=60.0 mL/min/1.73m*2    Anion Gap 6 3 - 15 mEQ/L     Serum creatinine: 0.7 mg/dL (L) 08/12/22 2220  Estimated creatinine clearance: 69.4 mL/min (A)  Microbiology Results     Procedure Component Value Units Date/Time    MRSA Screen, Nares Only Nose [[id number]]  (Normal) Collected: 08/02/22 2248    Specimen: Nasal Swab from Nose Updated: 08/03/22 0030     MRSA DNA, Nares Negative    SARS-CoV-2 (COVID-19), PCR Nasopharynx [[id number]]  (Normal) Collected: 08/02/22 1633    Specimen: Nasopharyngeal Swab from Nasopharynx Updated: 08/02/22 4059    Narrative:      The following orders were created for panel order SARS-CoV-2 (COVID-19), PCR Nasopharynx.  Procedure                                Abnormality         Status                     ---------                               -----------         ------                     SARS-CoV-2 (COVID-19), P...[[id number]]  Normal              Final result                 Please view results for these tests on the individual orders.    SARS-CoV-2 (COVID-19), PCR Nasopharynx [[id number]]  (Normal) Collected: 08/02/22 1633    Specimen: Nasopharyngeal Swab from Nasopharynx Updated: 08/02/22 1807     SARS-CoV-2 (COVID-19) Negative    Narrative:      Testing performed using real-time PCR for detection of COVID-19. EUA approved validation studies performed on site.     Anaerobic Culture / Smear (includes Aerobic Culture) [[id number]] Collected: 08/02/22 1536    Specimen: Abscess Fluid from Buttock, Left Updated: 08/02/22 2247     Gram Stain Result 3+ WBC      2+ Gram positive cocci      2+ Gram positive bacilli    SARS-CoV-2 (COVID-19), PCR Nasopharynx [[id number]]  (Normal) Collected: 07/25/22 1341    Specimen: Nasopharyngeal Swab from Nasopharynx Updated: 07/25/22 1525    Narrative:      The following orders were created for panel order SARS-CoV-2 (COVID-19), PCR Nasopharynx.  Procedure                               Abnormality         Status                     ---------                               -----------         ------                     SARS-CoV-2 (COVID-19), P...[[id number]]  Normal              Final result                 Please view results for these tests on the individual orders.    SARS-CoV-2 (COVID-19), PCR Nasopharynx [[id number]]  (Normal) Collected: 07/25/22 1341    Specimen: Nasopharyngeal Swab from Nasopharynx Updated: 07/25/22 1525     SARS-CoV-2 (COVID-19) Negative    SARS-CoV-2 (COVID-19), PCR Nasopharynx [[id number]]  (Normal) Collected: 07/22/22 2001    Specimen: Nasopharyngeal Swab from Nasopharynx Updated: 07/22/22 2125    Narrative:      The following orders were created for panel order SARS-CoV-2 (COVID-19), PCR  Nasopharynx.  Procedure                               Abnormality         Status                     ---------                               -----------         ------                     SARS-CoV-2 (COVID-19), P...[[id number]]  Normal              Final result                 Please view results for these tests on the individual orders.    SARS-CoV-2 (COVID-19), PCR Nasopharynx [[id number]]  (Normal) Collected: 07/22/22 2001    Specimen: Nasopharyngeal Swab from Nasopharynx Updated: 07/22/22 2125     SARS-CoV-2 (COVID-19) Negative    Narrative:      Testing performed using real-time PCR for detection of COVID-19. EUA approved validation studies performed on site.           IMAGING:  No results found.    Radiology Imaging    XR CHEST 1 VW    Narrative  CLINICAL HISTORY: Ataxia.    --    Impression  Findings suspicious for congestive heart failure.    COMMENT: AP radiograph of the chest was obtained.  We have no prior similar  studies for comparison.  There is cardiomegaly.  There is vascular  congestion/interstitial edema.  There are bilateral effusions.  Findings suggest  congestive heart failure.  Sternal sutures are seen.  There is no pneumothorax.  We have no prior similar studies for comparison.       Lines, Drains, Airways, Wounds:     CVC Triple Lumen 08/02/22 Internal jugular (Active)   Number of days: 1       Peripheral IV (Adult) 07/22/22 Left Forearm (Active)   Number of days: 12       Peripheral IV (Adult) 08/02/22 Right Forearm (Active)   Number of days: 1       Peripheral IV (Adult) 08/02/22 Anterior;Left;Proximal Forearm (Active)   Number of days: 1       NG/OG Tube 08/02/22 Center mouth (Active)   Number of days: 1       Urethral Catheter Temperature probe 16 Fr (Active)   Number of days: 1       ETT  (Active)   Number of days: 1       Arterial Line 08/02/22 Left Radial (Active)   Number of days: 1       Surgical Incision Buttock (Active)   Number of days: 1       PHYSICAL EXAM     General appearance:  Sleepy, arousable  Head: normocephalic, without obvious abnormality, atraumatic.  Eyes: negative, no scleral icterus.   Nose: no discharge  Neck: no JVD, symmetrical, trachea midline.    . Left neck with swan odalis catheter in place  Lungs: No respiratory distress   Chest wall: No deformities or palpable masses  Heart: Regular rate and rhythm.   Abdomen: soft, moderately distended, non-tender.   Ostomy pink, protruding, perfused, productive of gas and stool.  Genitalia: edematous, erythematous. No streaking or crepitous.    Incisions clean, dry, intact. Erythema stable.   Extremities: extremities warm and well-perfused.     Pulses: 2+ and symmetric.  Skin: Skin color, texture, turgor normal.  Neurologic: Unable to assess secondary to sedation.       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 male HD#11 s/p EUA, wound debridement for necrotizing soft tissue infection of the perirectal and perineal tissue on 8/2. 9 Days Post-Op    NEURO: Extubated. Precedex.  Dilaudid as needed.   CV: aFib. HR 70-100s. Off Levo. Cuff Pressures. Metop 2.5mg Q6.  EF 40-45% (8/3).   PULM: Extubated. On 40L HHFNC. Can go up to BiPAP if needed. DNR/DNI.   GI:  NPO. PPI. LUQ end-colostomy. Productive gas & stool. Tube Feeds held now OGT out.  Free water flushes at 75 cc every 2 hours. Will discuss DHT placement today   : Fc. Cr: 1.0 (1.1). >1.0cc/kg/hr UOP. Genitalia erythematous. Daily dressing changes.  HEME: HgB of 12.6. plts 88 this am.  ID: WBC: 16.52 (19.34) Pancultures from 8/8 pending. NSTI. Cefipime 14da (until 8/16). OR Cx: 2+ E. Coli, 2+ streptoccus anginous, 2+ enterobacter. Sputum w/  4+ yeast. BC from 8/8 w/ NG48.  ENDO: Insulin ggt. -180 last 24hrs.   DVT PPX: SCDs & sq heparin TID  GI PPX:  PPI      DISPOSITION:  SICU.     Please page [phone] with any questions or concerns.  Tin Singer MD  8/13/2022  2:39 AM

## 2022-08-13 NOTE — HOSPITAL COURSE
Samy is a 89 y.o. 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    Past Medical History  Samy has a past medical history of Atrial fibrillation (CMS/HCC), Disease of thyroid gland, GI (gastrointestinal bleed), Hypertension, and Myocardial infarction (CMS/HCC).    Per PMR: CAD status post CABG, CHF, tricuspid regurgitation and pulmonary hypertension and history of GI bleed.  Hospitalized 8-2-2022 with for near gangrene/necrotizing fasciitis status post surgical debridement and washout 8-2-2022 subsequent laparoscopic sigmoid colostomy and repeat washout 8-3-2022.  Complicated by sepsis and shock requiring pressor support with respiratory failure mechanical ventilation through 8-.  Found to be dysarthric and ataxic with delirium.  Brain MRI negative for acute event    History of Present Illness   Admit from OP MD's office 8/2 for denise's gangrene. s/p EUA wound debridement of gluteal NSTI on 8/2 and diverting loop sigmoid colostomy on 8/3  He was then admitted to the surgical ICU where he was intubated and had transient pressor requirements. ETT 8/3-8/12, extubated 8/13. Cleared for thick liquids and puree.    C/f stroke 8/17, MRI negative for acute infarct or hemorrhage  COVID (+) 8/18, transfer to surgical step-down

## 2022-08-13 NOTE — PROGRESS NOTES
Surgical Critical Care Progress Note    Patient evaluated by speech and swallow who noted patient might have facial droop. Evaluated patient bedside with attending physician. There are no signs of facial droop or neurological symptoms to suggest stroke. Will continue to monitor.    Jakob East MD  General Surgery PGY-2

## 2022-08-13 NOTE — PLAN OF CARE
Plan of Care Review  Plan of Care Reviewed With: patient  Progress: improving  Outcome Summary: No acute events overnight. VSS, afebrile. Remains off levo. Insulin gtt d/c'd, transitioned to sliding scale coverage. Exubated to [facility] yesterday- tolerating well.

## 2022-08-14 ENCOUNTER — APPOINTMENT (INPATIENT)
Dept: RADIOLOGY | Facility: HOSPITAL | Age: 87
DRG: 717 | End: 2022-08-14
Attending: STUDENT IN AN ORGANIZED HEALTH CARE EDUCATION/TRAINING PROGRAM
Payer: MEDICARE

## 2022-08-14 LAB
ALBUMIN SERPL-MCNC: 1.7 G/DL (ref 3.4–5)
ALP SERPL-CCNC: 91 IU/L (ref 35–126)
ALT SERPL-CCNC: 32 IU/L (ref 16–63)
ANION GAP SERPL CALC-SCNC: 5 MEQ/L (ref 3–15)
ANION GAP SERPL CALC-SCNC: 8 MEQ/L (ref 3–15)
ANION GAP SERPL CALC-SCNC: 8 MEQ/L (ref 3–15)
AST SERPL-CCNC: 87 IU/L (ref 15–41)
BILIRUB SERPL-MCNC: 1 MG/DL (ref 0.3–1.2)
BUN SERPL-MCNC: 41 MG/DL (ref 8–20)
BUN SERPL-MCNC: 44 MG/DL (ref 8–20)
BUN SERPL-MCNC: 44 MG/DL (ref 8–20)
CA-I BLD-SCNC: 1.19 MMOL/L (ref 1.15–1.27)
CALCIUM SERPL-MCNC: 8 MG/DL (ref 8.9–10.3)
CHLORIDE SERPL-SCNC: 121 MEQ/L (ref 98–109)
CHLORIDE SERPL-SCNC: 121 MEQ/L (ref 98–109)
CHLORIDE SERPL-SCNC: 122 MEQ/L (ref 98–109)
CO2 SERPL-SCNC: 23 MEQ/L (ref 22–32)
CO2 SERPL-SCNC: 23 MEQ/L (ref 22–32)
CO2 SERPL-SCNC: 24 MEQ/L (ref 22–32)
CREAT SERPL-MCNC: 0.9 MG/DL (ref 0.8–1.3)
CREAT SERPL-MCNC: 1 MG/DL (ref 0.8–1.3)
CREAT SERPL-MCNC: 1 MG/DL (ref 0.8–1.3)
ERYTHROCYTE [DISTWIDTH] IN BLOOD BY AUTOMATED COUNT: 16.3 % (ref 11.6–14.4)
GFR SERPL CREATININE-BSD FRML MDRD: >60 ML/MIN/1.73M*2
GLUCOSE BLD-MCNC: 229 MG/DL (ref 70–99)
GLUCOSE SERPL-MCNC: 162 MG/DL (ref 70–99)
GLUCOSE SERPL-MCNC: 162 MG/DL (ref 70–99)
GLUCOSE SERPL-MCNC: 211 MG/DL (ref 70–99)
HCT VFR BLDCO AUTO: 35 % (ref 40.1–51)
HGB BLD-MCNC: 11.3 G/DL (ref 13.7–17.5)
INR PPP: 1.4
MAGNESIUM SERPL-MCNC: 2.3 MG/DL (ref 1.8–2.5)
MCH RBC QN AUTO: 34.9 PG (ref 28–33.2)
MCHC RBC AUTO-ENTMCNC: 32.3 G/DL (ref 32.2–36.5)
MCV RBC AUTO: 108 FL (ref 83–98)
PDW BLD AUTO: 11.5 FL (ref 9.4–12.4)
PHOSPHATE SERPL-MCNC: 3.1 MG/DL (ref 2.4–4.7)
PLATELET # BLD AUTO: 108 K/UL (ref 150–350)
POCT TEST: ABNORMAL
POTASSIUM SERPL-SCNC: 3.7 MEQ/L (ref 3.6–5.1)
POTASSIUM SERPL-SCNC: 4.1 MEQ/L (ref 3.6–5.1)
POTASSIUM SERPL-SCNC: 4.1 MEQ/L (ref 3.6–5.1)
PROT SERPL-MCNC: 5.7 G/DL (ref 6–8.2)
PROTHROMBIN TIME: 17.1 SEC (ref 12.2–14.5)
RBC # BLD AUTO: 3.24 M/UL (ref 4.5–5.8)
SODIUM SERPL-SCNC: 151 MEQ/L (ref 136–144)
SODIUM SERPL-SCNC: 152 MEQ/L (ref 136–144)
SODIUM SERPL-SCNC: 152 MEQ/L (ref 136–144)
T4 FREE SERPL-MCNC: 0.25 NG/DL (ref 0.58–1.64)
TSH SERPL DL<=0.05 MIU/L-ACNC: 9.96 MIU/L (ref 0.34–5.6)
WBC # BLD AUTO: 9.62 K/UL (ref 3.8–10.5)

## 2022-08-14 PROCEDURE — 83735 ASSAY OF MAGNESIUM: CPT | Performed by: STUDENT IN AN ORGANIZED HEALTH CARE EDUCATION/TRAINING PROGRAM

## 2022-08-14 PROCEDURE — 63600000 HC DRUGS/DETAIL CODE: Performed by: SURGERY

## 2022-08-14 PROCEDURE — 74018 RADEX ABDOMEN 1 VIEW: CPT

## 2022-08-14 PROCEDURE — 84443 ASSAY THYROID STIM HORMONE: CPT | Performed by: INTERNAL MEDICINE

## 2022-08-14 PROCEDURE — 20000000 HC ROOM AND CARE ICU

## 2022-08-14 PROCEDURE — 80053 COMPREHEN METABOLIC PANEL: CPT | Performed by: STUDENT IN AN ORGANIZED HEALTH CARE EDUCATION/TRAINING PROGRAM

## 2022-08-14 PROCEDURE — 84100 ASSAY OF PHOSPHORUS: CPT | Performed by: STUDENT IN AN ORGANIZED HEALTH CARE EDUCATION/TRAINING PROGRAM

## 2022-08-14 PROCEDURE — 84439 ASSAY OF FREE THYROXINE: CPT | Performed by: INTERNAL MEDICINE

## 2022-08-14 PROCEDURE — 85027 COMPLETE CBC AUTOMATED: CPT | Performed by: STUDENT IN AN ORGANIZED HEALTH CARE EDUCATION/TRAINING PROGRAM

## 2022-08-14 PROCEDURE — 25800000 HC PHARMACY IV SOLUTIONS: Performed by: SURGERY

## 2022-08-14 PROCEDURE — 36415 COLL VENOUS BLD VENIPUNCTURE: CPT | Performed by: SURGERY

## 2022-08-14 PROCEDURE — 82330 ASSAY OF CALCIUM: CPT | Performed by: SURGERY

## 2022-08-14 PROCEDURE — 25000000 HC PHARMACY GENERAL: Performed by: SURGERY

## 2022-08-14 PROCEDURE — 63600000 HC DRUGS/DETAIL CODE: Performed by: STUDENT IN AN ORGANIZED HEALTH CARE EDUCATION/TRAINING PROGRAM

## 2022-08-14 PROCEDURE — 85610 PROTHROMBIN TIME: CPT | Performed by: STUDENT IN AN ORGANIZED HEALTH CARE EDUCATION/TRAINING PROGRAM

## 2022-08-14 PROCEDURE — 25800000 HC PHARMACY IV SOLUTIONS: Performed by: STUDENT IN AN ORGANIZED HEALTH CARE EDUCATION/TRAINING PROGRAM

## 2022-08-14 PROCEDURE — 99232 SBSQ HOSP IP/OBS MODERATE 35: CPT | Performed by: INTERNAL MEDICINE

## 2022-08-14 PROCEDURE — 92526 ORAL FUNCTION THERAPY: CPT | Mod: GN

## 2022-08-14 PROCEDURE — 25000000 HC PHARMACY GENERAL: Performed by: STUDENT IN AN ORGANIZED HEALTH CARE EDUCATION/TRAINING PROGRAM

## 2022-08-14 PROCEDURE — 80048 BASIC METABOLIC PNL TOTAL CA: CPT | Performed by: STUDENT IN AN ORGANIZED HEALTH CARE EDUCATION/TRAINING PROGRAM

## 2022-08-14 RX ORDER — SODIUM CHLORIDE 450 MG/100ML
INJECTION, SOLUTION INTRAVENOUS CONTINUOUS
Status: DISCONTINUED | OUTPATIENT
Start: 2022-08-14 | End: 2022-08-14

## 2022-08-14 RX ADMIN — CEFEPIME 2 G: 2 INJECTION, POWDER, FOR SOLUTION INTRAVENOUS at 11:31

## 2022-08-14 RX ADMIN — HEPARIN SODIUM 5000 UNITS: 5000 INJECTION, SOLUTION INTRAVENOUS; SUBCUTANEOUS at 06:40

## 2022-08-14 RX ADMIN — PANTOPRAZOLE SODIUM 40 MG: 40 INJECTION, POWDER, FOR SOLUTION INTRAVENOUS at 09:09

## 2022-08-14 RX ADMIN — INSULIN HUMAN 3 UNITS: 100 INJECTION, SOLUTION PARENTERAL at 23:24

## 2022-08-14 RX ADMIN — COLLAGENASE SANTYL 1 APPLICATION.: 250 OINTMENT TOPICAL at 09:09

## 2022-08-14 RX ADMIN — CEFEPIME 2 G: 2 INJECTION, POWDER, FOR SOLUTION INTRAVENOUS at 00:22

## 2022-08-14 RX ADMIN — HEPARIN SODIUM 5000 UNITS: 5000 INJECTION, SOLUTION INTRAVENOUS; SUBCUTANEOUS at 14:30

## 2022-08-14 RX ADMIN — HEPARIN SODIUM 5000 UNITS: 5000 INJECTION, SOLUTION INTRAVENOUS; SUBCUTANEOUS at 21:07

## 2022-08-14 RX ADMIN — METOPROLOL TARTRATE 5 MG: 1 INJECTION, SOLUTION INTRAVENOUS at 11:34

## 2022-08-14 RX ADMIN — METOPROLOL TARTRATE 5 MG: 1 INJECTION, SOLUTION INTRAVENOUS at 06:40

## 2022-08-14 RX ADMIN — DAKIN'S SOLUTION 0.125% (QUARTER STRENGTH): 0.12 SOLUTION at 09:09

## 2022-08-14 RX ADMIN — METOPROLOL TARTRATE 5 MG: 1 INJECTION, SOLUTION INTRAVENOUS at 00:23

## 2022-08-14 RX ADMIN — SODIUM CHLORIDE: 4.5 INJECTION, SOLUTION INTRAVENOUS at 10:20

## 2022-08-14 ASSESSMENT — COGNITIVE AND FUNCTIONAL STATUS - GENERAL
UNDERSTANDING 10 TO 15 MIN SPEECH: 3 - A LITTLE
FOLLOWS FAMILIAR CONVERSATION: 3 - A LITTLE
REMEMBERING WHERE THINGS ARE: 3 - A LITTLE
TAKING CARE OF COMPLICATED TASKS: 1 - UNABLE
AFFECT: WFL
REMEMBERING 5 ERRANDS WITH NO LIST: 2 - A LOT
REMEMBERING TO TAKE MEDICATION: 2 - A LOT

## 2022-08-14 NOTE — PLAN OF CARE
Problem: Adult Inpatient Plan of Care  Goal: Patient-Specific Goal (Individualized)  Outcome: Progressing  Flowsheets (Taken 8/14/2022 0515)  Patient-Specific Goals (Include Timeframe): Keep comfortable, dobhof placement in am  Individualized Care Needs: q2HR turns, wound care, reorientation  Anxieties, Fears or Concerns: Anxious about son knowing where he is     Problem: Adult Inpatient Plan of Care  Goal: Plan of Care Review  Outcome: Progressing  Flowsheets (Taken 8/14/2022 0515)  Progress: improving  Plan of Care Reviewed With: patient  Outcome Summary: Pt Mildly confused overnight, frequently woke up wondering where his son was and were he was, VSS otherwise, q2hr turns to keep off backside, BID BMP's, potential for dobhof today   Plan of Care Review  Plan of Care Reviewed With: patient  Progress: improving  Outcome Summary: Pt Mildly confused overnight, frequently woke up wondering where his son was and were he was, VSS otherwise, q2hr turns to keep off backside, BID BMP's, potential for dobhof today

## 2022-08-14 NOTE — PROGRESS NOTES
SURGICAL CRITICAL CARE DAILY PROGRESS NOTE     PATIENT NAME:  Samy Elena YOB: 1930    AGE:  89 y.o.  GENDER: male   MRN:  [id number]  PATIENT #: [id number]     SUBJECTIVE     No acute acute events overnight. Weaned down to 4L NC yesterday. Remains DNR/DNI. Family adament about no reintubation but okay for supportive measures.     VITAL SIGNS     Temperature:   Temp (24hrs), Av.7 °C (98 °F), Min:36.5 °C (97.7 °F), Max:36.8 °C (98.2 °F)       Last 24 hours:    Temp:  [36.5 °C (97.7 °F)-36.8 °C (98.2 °F)] 36.8 °C (98.2 °F)  Heart Rate:  [] 97  Resp:  [18-23] 18  BP: (101-130)/(51-64) 119/64  FiO2 (%) (Set):  [40 %] 40 %    VENT SETTINGS:   FiO2 (%) (Set):  [40 %] 40 %    INTAKE & OUTPUT     I/O last 3 completed shifts:  In: 1770.8 [I.V.:70.8; NG/GT:1150; IV Piggyback:550]  Out: 2620 [Urine:1845; Stool:775]    Intake/Output Summary (Last 24 hours) at 2022 0428  Last data filed at 2022 0200  Gross per 24 hour   Intake 200 ml   Output 1220 ml   Net -1020 ml     I/O this shift:  In: 100 [IV Piggyback:100]  Out: 560 [Urine:560]     MEDICATIONS     Infusions:             Scheduled:   • cefepime  2 g intravenous q12h INT   • collagenase  1 application Topical Daily   • heparin (porcine)  5,000 Units subcutaneous q8h IVÁN   • insulin regular U-100  3-5 Units subcutaneous Nightly   • metoprolol  5 mg intravenous q6h INT   • pantoprazole  40 mg intravenous q24h   • sodium hypochlorite   Topical Daily       Antibiotics:  Anti-infectives (From admission, onward)    Start     Dose/Rate Route Frequency Ordered Stop    22 1200  ceFEPIme (MAXIPIME) 2 g in 100 mL NSS vial in bag         2 g  200 mL/hr over 30 Minutes intravenous Every 12 hours interval 08/05/22 1110            PRN:     acetaminophen, 650 mg, q4h PRN  glucose, 16-32 g of dextrose, PRN   Or  dextrose, 15-30 g of dextrose, PRN   Or  glucagon, 1 mg, PRN   Or  dextrose 50 % in water (D50), 25 mL, PRN  HYDROmorphone, 0.25 mg,  q3h PRN         DIAGNOSTIC DATA     LABS:  Recent Results (from the past 24 hour(s))   Calcium, ionized    Collection Time: 08/13/22  5:55 AM   Result Value Ref Range    Ionized Calcium, Venous 1.18 1.15 - 1.27 mmol/L   Comprehensive metabolic panel    Collection Time: 08/13/22  5:55 AM   Result Value Ref Range    Sodium 148 (H) 136 - 144 mEQ/L    Potassium 4.2 3.6 - 5.1 mEQ/L    Chloride 119 (H) 98 - 109 mEQ/L    CO2 23 22 - 32 mEQ/L    BUN 41 (H) 8 - 20 mg/dL    Creatinine 0.9 0.8 - 1.3 mg/dL    Glucose 153 (H) 70 - 99 mg/dL    Calcium 7.9 (L) 8.9 - 10.3 mg/dL    AST (SGOT) 99 (H) 15 - 41 IU/L    ALT (SGPT) 35 16 - 63 IU/L    Alkaline Phosphatase 96 35 - 126 IU/L    Total Protein 5.6 (L) 6.0 - 8.2 g/dL    Albumin 1.7 (L) 3.4 - 5.0 g/dL    Bilirubin, Total 1.1 0.3 - 1.2 mg/dL    eGFR >60.0 >=60.0 mL/min/1.73m*2    Anion Gap 6 3 - 15 mEQ/L   Magnesium    Collection Time: 08/13/22  5:55 AM   Result Value Ref Range    Magnesium 2.2 1.8 - 2.5 mg/dL   Phosphorus    Collection Time: 08/13/22  5:55 AM   Result Value Ref Range    Phosphorus 3.3 2.4 - 4.7 mg/dL   Protime-INR    Collection Time: 08/13/22  5:55 AM   Result Value Ref Range    PT 16.7 (H) 12.2 - 14.5 sec    INR 1.4     CBC    Collection Time: 08/13/22  5:55 AM   Result Value Ref Range    WBC 10.26 3.80 - 10.50 K/uL    RBC 3.31 (L) 4.50 - 5.80 M/uL    Hemoglobin 11.6 (L) 13.7 - 17.5 g/dL    Hematocrit 35.6 (L) 40.1 - 51.0 %    .6 (H) 83.0 - 98.0 fL    MCH 35.0 (H) 28.0 - 33.2 pg    MCHC 32.6 32.2 - 36.5 g/dL    RDW 15.9 (H) 11.6 - 14.4 %    Platelets 93 (L) 150 - 350 K/uL    MPV 11.7 9.4 - 12.4 fL   POCT Glucose    Collection Time: 08/13/22  9:13 AM   Result Value Ref Range    POCT Bedside Glucose 117 (H) 70 - 99 mg/dL    POC Test POC    Basic metabolic panel    Collection Time: 08/13/22  6:03 PM   Result Value Ref Range    Sodium 150 (H) 136 - 144 mEQ/L    Potassium 4.2 3.6 - 5.1 mEQ/L    Chloride 121 (H) 98 - 109 mEQ/L    CO2 22 22 - 32 mEQ/L    BUN 43  (H) 8 - 20 mg/dL    Creatinine 0.9 0.8 - 1.3 mg/dL    Glucose 168 (H) 70 - 99 mg/dL    Calcium 8.1 (L) 8.9 - 10.3 mg/dL    eGFR >60.0 >=60.0 mL/min/1.73m*2    Anion Gap 7 3 - 15 mEQ/L   POCT Glucose    Collection Time: 08/13/22 10:42 PM   Result Value Ref Range    POCT Bedside Glucose 126 (H) 70 - 99 mg/dL    POC Test POC      Serum creatinine: 0.9 mg/dL 08/13/22 1803  Estimated creatinine clearance: 61.7 mL/min  Microbiology Results     Procedure Component Value Units Date/Time    MRSA Screen, Nares Only Nose [[id number]]  (Normal) Collected: 08/02/22 2248    Specimen: Nasal Swab from Nose Updated: 08/03/22 0030     MRSA DNA, Nares Negative    SARS-CoV-2 (COVID-19), PCR Nasopharynx [[id number]]  (Normal) Collected: 08/02/22 1633    Specimen: Nasopharyngeal Swab from Nasopharynx Updated: 08/02/22 1807    Narrative:      The following orders were created for panel order SARS-CoV-2 (COVID-19), PCR Nasopharynx.  Procedure                               Abnormality         Status                     ---------                               -----------         ------                     SARS-CoV-2 (COVID-19), P...[[id number]]  Normal              Final result                 Please view results for these tests on the individual orders.    SARS-CoV-2 (COVID-19), PCR Nasopharynx [[id number]]  (Normal) Collected: 08/02/22 1633    Specimen: Nasopharyngeal Swab from Nasopharynx Updated: 08/02/22 1807     SARS-CoV-2 (COVID-19) Negative    Narrative:      Testing performed using real-time PCR for detection of COVID-19. EUA approved validation studies performed on site.     Anaerobic Culture / Smear (includes Aerobic Culture) [[id number]] Collected: 08/02/22 1536    Specimen: Abscess Fluid from Buttock, Left Updated: 08/02/22 2247     Gram Stain Result 3+ WBC      2+ Gram positive cocci      2+ Gram positive bacilli    SARS-CoV-2 (COVID-19), PCR Nasopharynx [[id number]]  (Normal) Collected: 07/25/22 1341    Specimen: Nasopharyngeal Swab  from Nasopharynx Updated: 07/25/22 1525    Narrative:      The following orders were created for panel order SARS-CoV-2 (COVID-19), PCR Nasopharynx.  Procedure                               Abnormality         Status                     ---------                               -----------         ------                     SARS-CoV-2 (COVID-19), P...[[id number]]  Normal              Final result                 Please view results for these tests on the individual orders.    SARS-CoV-2 (COVID-19), PCR Nasopharynx [[id number]]  (Normal) Collected: 07/25/22 1341    Specimen: Nasopharyngeal Swab from Nasopharynx Updated: 07/25/22 1525     SARS-CoV-2 (COVID-19) Negative    SARS-CoV-2 (COVID-19), PCR Nasopharynx [[id number]]  (Normal) Collected: 07/22/22 2001    Specimen: Nasopharyngeal Swab from Nasopharynx Updated: 07/22/22 2125    Narrative:      The following orders were created for panel order SARS-CoV-2 (COVID-19), PCR Nasopharynx.  Procedure                               Abnormality         Status                     ---------                               -----------         ------                     SARS-CoV-2 (COVID-19), P...[[id number]]  Normal              Final result                 Please view results for these tests on the individual orders.    SARS-CoV-2 (COVID-19), PCR Nasopharynx [[id number]]  (Normal) Collected: 07/22/22 2001    Specimen: Nasopharyngeal Swab from Nasopharynx Updated: 07/22/22 2125     SARS-CoV-2 (COVID-19) Negative    Narrative:      Testing performed using real-time PCR for detection of COVID-19. EUA approved validation studies performed on site.           IMAGING:  No results found.    Radiology Imaging    XR CHEST 1 VW    Narrative  CLINICAL HISTORY: Ataxia.    --    Impression  Findings suspicious for congestive heart failure.    COMMENT: AP radiograph of the chest was obtained.  We have no prior similar  studies for comparison.  There is cardiomegaly.  There is  vascular  congestion/interstitial edema.  There are bilateral effusions.  Findings suggest  congestive heart failure.  Sternal sutures are seen.  There is no pneumothorax.  We have no prior similar studies for comparison.       Lines, Drains, Airways, Wounds:     CVC Triple Lumen 08/02/22 Internal jugular (Active)   Number of days: 1       Peripheral IV (Adult) 07/22/22 Left Forearm (Active)   Number of days: 12       Peripheral IV (Adult) 08/02/22 Right Forearm (Active)   Number of days: 1       Peripheral IV (Adult) 08/02/22 Anterior;Left;Proximal Forearm (Active)   Number of days: 1       NG/OG Tube 08/02/22 Center mouth (Active)   Number of days: 1       Urethral Catheter Temperature probe 16 Fr (Active)   Number of days: 1       ETT  (Active)   Number of days: 1       Arterial Line 08/02/22 Left Radial (Active)   Number of days: 1       Surgical Incision Buttock (Active)   Number of days: 1       PHYSICAL EXAM     General appearance: Sleepy, arousable  Head: normocephalic, without obvious abnormality, atraumatic.  Eyes: negative, no scleral icterus.   Nose: no discharge  Neck: no JVD, symmetrical, trachea midline.    . Left neck with swan odalis catheter in place  Lungs: No respiratory distress   Chest wall: No deformities or palpable masses  Heart: Regular rate and rhythm.   Abdomen: soft, moderately distended, non-tender.   Ostomy pink, protruding, perfused, productive of gas and stool.  Genitalia: edematous, erythematous. No streaking or crepitous.    Incisions clean, dry, intact. Erythema stable.   Extremities: extremities warm and well-perfused.     Pulses: 2+ and symmetric.  Skin: Skin color, texture, turgor normal.  Neurologic: Unable to assess secondary to sedation.       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 10 Days Post-Op    NEURO: Extubated. Off continuous sedation, Precedex gtt stopped at 8/12 @ 2pm. Tylenol and Dilaudid as needed.   CV: aFib. HR 70-100s. Off Levo. Cuff Pressures. Metop 5mg Q6.  EF 40-45% (8/3).   PULM: Extubated. On 4L NC. Can go up to BiPAP if needed. DNR/DNI.   GI:  NPO. PPI. LUQ end-colostomy. Productive gas & stool. Tube Feeds held now OGT out.  Free water flushes at 75 cc every 2 hours. DHT not placed yesterday. Patient recently extubated and did not want to increase risk of patient aspirating. Evaluated by speech and swallow who said patient does not have ability to swallow at this time.   : Fc. Cr: 0.9 (0.9). >1.0cc/kg/hr UOP. Genitalia erythematous. Daily dressing changes.  HEME: HgB of 11.3<--11.6. plts 108 this am.  ID: WBC: 9.62<--10.26 (10.26) NSTI. Cefipime 14da (until 8/16). OR Cx: 2+ E. Coli, 2+ streptoccus anginous, 2+ enterobacter. Sputum w/ 4+ yeast. BC from 8/8 w/ .  ENDO: ISS. -180 last 24hrs.   DVT PPX: SCDs & sq heparin TID  GI PPX:  PPI      DISPOSITION:  SICU.     Please page [phone] with any questions or concerns.  Jakob East MD  8/14/2022  4:28 AM

## 2022-08-14 NOTE — PROGRESS NOTES
Patient:  Samy Elena  Location:  [facility] SICU SICU12  MRN:  [id number]  Today's date:  8/14/2022  Speech Pathology: Therapy session    SLP Diagnosis: Moderate oral stage and suspected pharyngeal stage dysphagia, improving dysphonia status post ETT 8/3-8/12, dysarthria-baseline?    Recommendations:  1. Conservative initiation of L4-pureed solids/l2-mildly thick liquids, medication whole in puree  2.Aspiration precautions including 1;1 supervision, small single bites and sips, slow rate of ingestion, assistance with self-feeding  3. GERD precautions including upright posture during/after meals   4. Patient remains at risk for silent aspiration in setting of prolonged intubation. Recommend close monitoring for tolerance. If the pt shows increased s/sx of aspiration, change in status, increased oxygen demands please d/c oral diet and notify SLP.   5. Could consider neurology consult as there is unclear etiology of patient's dysarthria  6. SLP for close follow-up to assess tolerance of diet, determine if objective assessment of swallow is warranted.       Summary/Handoff:  Daily Outcome Statement: Dysphagia follow-up completed at the bedside.  Patient received in bed, currently only requiring 3 L nasal cannula, greatly reduced from previous day.  Patient more awake and alert this day, patient more intelligible to SLP (SLP more familiar this day with patient).  Patient oriented x2, denying pain.  P.o. trials conducted, patient showing inconsistent coughing with thin liquids, reduced lingual coordination and control and breakdown with higher level solids.  Patient showed no overt signs or symptoms of aspiration with puréed solids, mildly thick liquids.  Of note, patient with inconsistent reduced labial seal on Straw, reduced coordination with suck from straw.  Patient showed anterior spillage on the right side with liquids as well.  Unsure if this is acute change versus baseline as patient is reported  "to have had baseline dysarthria.  Patient with MRI with previous admission secondary to concern for weakness and dysarthria which appeared grossly clear.  Recommend conservative initiation of level 4 puréed solids/level 2 mildly thick liquids, medication whole in purée with one-to-one supervision/assistance with self-feeding.  Recommend small single bites and sips, slow rate of ingestion.  The patient remains at risk for silent aspiration in setting of prolonged intubation.  Recommend close monitoring for tolerance.  If the patient shows increased signs or symptoms of aspiration, change in status, increased oxygen demands please DC the oral diet and notify SLP.  Speech therapy for ongoing dysphagia follow-up to assess tolerance of diet, determine if objective assessment of swallow is warranted.      Session Notes:     GRBAS:   Grade 1   Roughness 0   Breathiness 1   Asthenia 1   Strain 0            Dietary Orders   (From admission, onward)             Start     Ordered    08/14/22 1242  Tube Feed with Food Peptamen AF; Continuous; 35; 65; please advance 10 q 2 hours; Water; 110; Every 1 hour; Pureed PU4; Mildly Thick MT2; RD/LDN may adjust order; AM Snack  (Tube Feed with Food Diet Orders with insertion and maintenance panels)  Diet effective now        References:    IDDSI Diet reference   Question Answer Comment   Tube Feeding Formula (LMC): Peptamen AF    Administration Type Continuous    Tube Feeding Start rate (mL/hr): 35    Tube Feeding Goal rate (mL/hr): 65    Nursing Instruction please advance 10 q 2 hours    Flush type: Water    Flush amount (mL): 110    Flush frequency: Every 1 hour    Diet Texture Pureed PU4    Fluid Consistency: Mildly Thick MT2    Delegation of Authority. Diet orders written by PA/Av may not be adjusted by RD/LDNs. RD/LDN may adjust order    Meal period (AM Snack required for CBORD, do not remove: Not clinically relevant) AM Snack       \"And\" Linked Group Details    08/14/22 1242    "             Results from last 7 days   Lab Units 08/14/22  0544 08/13/22  0555 08/12/22  0509   WBC K/uL 9.62 10.26 10.31   HEMOGLOBIN g/dL 11.3* 11.6* 11.2*   HEMATOCRIT % 35.0* 35.6* 34.2*   PLATELETS K/uL 108* 93* 88*          RN cleared SLP to assess/work with patient as no medical issues identified to preclude this therapy session. Following completion of session, pt remained in bed as they were found with call bell in reach.  Nurse was made aware of patients performance and any changes in status (if applicable).      Samy is a 89 y.o. 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    Past Medical History  Samy has a past medical history of Atrial fibrillation (CMS/HCC), Disease of thyroid gland, GI (gastrointestinal bleed), Hypertension, and Myocardial infarction (CMS/HCC).    History of Present Illness   Admit from OP MD's office 8/2 for denise's gangrene. He proceeded to the OR shortly after admission (8/3) for debridement.  He was then admitted to the surgical ICU where he was intubated and had transient pressor requirements. ETT 8/3-8/12, extubated HHFNC 50L, LmO122%. NPO      SLP Vitals    Date/Time Pulse Resp SpO2 BP MAP Observations [facility]   08/14/22 1100 95 16 100 % 125/67 90 mmHg SLP eval SMB      SLP Pain    Date/Time Pain Type Rating: Rest Rating: Activity [facility]   08/14/22 1042 Pain Assessment 0 - no pain 0 - no pain KAYLENE            Prior Level of Function    Flowsheet Row Most Recent Value   Eating independent   Communication difficulty speaking (not related to language barrier)  [per RN, pt's famiyl reports dysarthria PTA]   Swallowing difficulty swallowing foods   Baseline Diet/Method of Nutritional Intake no diet restrictions   Past History of Dysphagia Pt with no hx of dysphagia prior to admission   Assistive Device Currently Used at Home cane, straight, walker, front-wheeled           SLP Evaluation and Treatment -  22 1042        SLP Time Calculation    Start Time 1042     Stop Time 1107     Time Calculation (min) 25 min        Session Details    Document Type daily treatment/progress note     Mode of Treatment speech language pathology;individual therapy        General Information    Patient Profile Reviewed yes     General Observations of Patient Pt received in bed, more awake and alert, requring reduced oxygen demands     Existing Precautions/Restrictions aspiration;fall;NPO;oxygen therapy device and L/min   3L NC    Limitations/Impairments safety/cognitive;swallowing        Cognition/Psychosocial    Affect/Mental Status (Cognition) WFL     Orientation Status (Cognition) oriented to;person;situation   difficulty with ,    Comment, Cognition Pt with improved KOLBY this day, improved communication. Pt is pleasant and cooperative, able to make requests and ask questions. cog baseline?        Vocal Quality/Secretion Management (Oral Motor)    Vocal Quality (Oral Motor) hypophonic     Comment, Vocal Quality/Secretion Management (Oral Motor) improved from previous day, s/p ETT 8/3-        Motor Speech    Comment, Motor Speech Intervention Dysarthria present, but pt is more intelligible to SLP this day. Continued imprecise articualtion noted (occurring for ~month)-unclear etiology, MRI of brain clear?        Auditory Comprehension    Follows Commands (Auditory Comprehension) 1-step command     1 Step, Follows Commands (Auditory Comprehension) 75-90% accuracy;achieved with repetition     Comment, Assessment (Auditory Comprehension) Pt with improved ability to follow simple commands, answer simple questions. Pt requires repetitions at times , lang baseline?        Verbal Expression    Comment, Intervention (Verbal Expression) Pt is pleasant, observed with grossly fluent speech this day, pt is able to make simple requests-asked fro water, ice chips.        Functional Communication Measures    FCM: Motor Speech 5-->Level 5      FCM: Spoken Language, Comprehension 6-->Level 6     FCM: Swallowing 4-->Level 4        General Swallowing Observations    Current Diet/Method of Nutritional Intake NPO     Signs/Symptoms of Aspiration (Current Diet) concerns for silent aspiration     Respiratory Support (General Swallowing Observations) nasal cannula;supplemental oxygen   3L    Comment, Secretions/Suctioning Oral care completed prior to PO trials     Comment, General Swallowing Observations Pt assessed at the bedside, more awake and alert this day.        Food and Liquid Trials (NIS)    Patient Positioning HOB elevated (specify degrees)     Oral Intake/Feeding Performance oral trials administered by therapist     Liquid Consistencies Evaluated thin liquids;mildly thick liquids (MT2)     Thin Liquids use of teaspoon;single cup sips;straw sips     Comment, Thin Liquids inconistent cough with thin liquids observed     Mildly Thick Liquids (MT2) intact;single cup sips;sips from cup;straw sips;patient-controlled amounts     Comment, Mildly Thick Liquids (MT2) no overt s/sx of aspiration, clear VQ     Food Consistencies Evaluated pureed (PU4);soft and bite-sized (SB6);regular     Pureed (PU4) intact;use of teaspoon/fork     Comment, Pureed (PU4) excessive mastication     Soft and Bite-Sized (SB6) impaired;cued single bites/sips     Comment, Soft and Bite-Sized (SB6) reduced lingual coordination and control, reduced cohesive bolus formation     Regular impaired     Comment, Regular poor breakdown, expectoration     Oral Preparatory Phase of Swallow moderate impairment;mouth closure around utensil/cup decreased;lip seal impaired;bolus cohesion decreased;loss of bolus/oral leakage, right side;mastication, slow but effective;mastication, ineffective;oral holding of bolus     Oral Phase of Swallow moderate impairment;tongue movement and elevation reduced;delayed anterior-posterior transit;oral residue, incomplete swallow;suspect posterior bolus leak      Comment, Oral Phase pt observed with inconsistent reduced labial seal and suck from straw     Pharyngeal Phase of Swallow delayed swallow reflex initiation;uncoordinated swallow     Comment, Pharyngeal Phase inconsistent s/sx w/ thin liquids     Esophageal Phase of Swallow no clinical symptoms     Comment C/f recued lingual and labial coordiantion and control-baseline w/ dysarthria?        Swallowing Recommendations    Diet Consistency Recommendations mildly thick liquids (MT2);pureed (PU4)     Medication Administration whole with pureed     Supervision Level for Intake 1:1 supervision needed     Feeding/Delivery Recommendations allow patient to feed self if maintaining safety;check mouth frequently for oral residue/pocketing;small bites/sips;stop meal if showing signs of aspiration or fatigue (e.g., coughing, wet voice)     Posture Recommendations fully upright in chair/chair mode of bed     Swallowing Strategies alternate food and liquid intake     Instrumental Assessment Recommendations reassess via non-instrumental clinical swallow evaluation     Comment, Swallowing Recommendations Aspiration and GERD precautions including 1:1 supervision, small single bites and sips, slow rate of ingestion. The pt remains at risk for silent aspiration in setting of prolonged intubation. If the pt shows s/sx of aspiration, change in status, increased oxygen demands please d/c oral diet and notfiy SLP.        Swallowing Intervention    Dysphagia/Swallowing Interventions monitor tolerance of;current diet without evidence of aspiration;advanced diet/liquid texture trials        AM-PAC (TM) - Cognition (Current Function)    Following/understanding a 10-15 minute speech or presentation? 3 - A little     Understanding familiar people during ordinary conversations? 3 - A little     Remembering to take medications at the appropriate time? 2 - A lot     Remembering where things were placed or put away? 3 - A little     Remembering a list  of 3 or 4 errands without writing it down? 2 - A lot     Taking care of complicated tasks? 1 - Unable     AM-PAC (TM) Cognition Score 14        Assessment/Plan (SLP)    Daily Outcome Statement Dysphagia follow-up completed at the bedside.  Patient received in bed, currently only requiring 3 L nasal cannula, greatly reduced from previous day.  Patient more awake and alert this day, patient more intelligible to SLP (SLP more familiar this day with patient).  Patient oriented x2, denying pain.  P.o. trials conducted, patient showing inconsistent coughing with thin liquids, reduced lingual coordination and control and breakdown with higher level solids.  Patient showed no overt signs or symptoms of aspiration with puréed solids, mildly thick liquids.  Of note, patient with inconsistent reduced labial seal on Straw, reduced coordination with suck from straw.  Patient showed anterior spillage on the right side with liquids as well.  Unsure if this is acute change versus baseline as patient is reported to have had baseline dysarthria.  Patient with MRI with previous admission secondary to concern for weakness and dysarthria which appeared grossly clear.  Recommend conservative initiation of level 4 puréed solids/level 2 mildly thick liquids, medication whole in purée with one-to-one supervision/assistance with self-feeding.  Recommend small single bites and sips, slow rate of ingestion.  The patient remains at risk for silent aspiration in setting of prolonged intubation.  Recommend close monitoring for tolerance.  If the patient shows increased signs or symptoms of aspiration, change in status, increased oxygen demands please DC the oral diet and notify SLP.  Speech therapy for ongoing dysphagia follow-up to assess tolerance of diet, determine if objective assessment of swallow is warranted.    Rehab Potential fair, will monitor progress closely     Therapy Frequency 3-5 times/wk     Planned Therapy Interventions  dysphagia therapy               SLP Assessment/Plan    Flowsheet Row Most Recent Value   SLP Diagnosis Moderate oral stage and suspected pharyngeal stage dysphagia, improving dysphonia status post ETT 8/3-8/12, dysarthria-baseline? at 08/14/2022 1042   Patient/Family Therapy Goal Statement to drink water at 08/14/2022 1042                    SLP Goals    Flowsheet Row Most Recent Value   Oral Nutrition/Hydration Goal 1    Activity effective/safe/independent, oral nutrition/hydration, use of swallowing techniques, management of texture/viscosity at 08/13/2022 1026   Time Frame long-term goal (LTG), by discharge at 08/13/2022 1026   Strategies/Barriers ETT x9 days, high oxygen demands at 08/13/2022 1026   Progress/Outcome goal ongoing at 08/14/2022 1042

## 2022-08-14 NOTE — PROGRESS NOTES
Endocrinology Progress Note       SUBJECTIVE   Interval History:    Patient is  More alert and awake. Able to communicate.  Talking much better than yesterday.   He failed swallowing kevin yesterday. Will be reevaluated again today.        OBJECTIVE      Vital signs in last 24 hours:  Temp:  [36.7 °C (98.1 °F)-36.8 °C (98.2 °F)] 36.8 °C (98.2 °F)  Heart Rate:  [] 86  Resp:  [16-20] 16  BP: (101-140)/(51-71) 140/71  FiO2 (%) (Set):  [40 %] 40 %      PHYSICAL EXAMINATION        Constitutional: well-developed. well- nourished. Appears lethargic  Eyes: Conjunctivae and EOM are normal. PERRL  Neck: Normal range of motion. Neck supple.    Neurological: Alert and awake      LABS / IMAGING / TELE      Labs    Lab Results   Component Value Date    GLUCOSE 162 (H) 08/14/2022    GLUCOSE 162 (H) 08/14/2022    CALCIUM 8.0 (L) 08/14/2022    CALCIUM 8.0 (L) 08/14/2022     (H) 08/14/2022     (H) 08/14/2022    K 4.1 08/14/2022    K 4.1 08/14/2022    CO2 23 08/14/2022    CO2 23 08/14/2022     (H) 08/14/2022     (H) 08/14/2022    BUN 44 (H) 08/14/2022    BUN 44 (H) 08/14/2022    CREATININE 1.0 08/14/2022    CREATININE 1.0 08/14/2022     Lab Results   Component Value Date    HGBA1C 6.3 (H) 07/23/2022     Lab Results   Component Value Date    CHOL 94 07/23/2022     Lab Results   Component Value Date    HDL 23 (L) 07/23/2022     Lab Results   Component Value Date    LDLCALC 63 07/23/2022     Lab Results   Component Value Date    TRIG 41 07/23/2022     No results found for: CHOLHDL  Lab Results   Component Value Date    ALT 32 08/14/2022    AST 87 (H) 08/14/2022    ALKPHOS 91 08/14/2022    BILITOT 1.0 08/14/2022     Lab Results   Component Value Date    TSH 4.72 08/01/2022       ASSESSMENT AND PLAN      1 History of preDM  - evidenced with A1c 6.3. on Jardiance 10 mg PTA  - required insulin with TF.     - now off TF. Waiting for speech eval.   - will monitor BG. Keep novolog ss if needed.   - pls notify  service about nutrition plan.      2 necrotizing fasciitis  This occurred in the context of SGLT2 inhibitor use for heart failure  Jardiance has been discontinued.  He should not receive any further treatment with medications in this class going forward.    3 Hypothyroidism  - good TSH. Was on LT4 PTA.    - repeat TSH/FT4.   - will order LT4 70 mcg IV daily if no GI route is allowed.     4, History of CHF - mgt per cardiology service    Discussed with his nurse.     Thank you very much for allowing me participating in this patient's care. Please feel free to contact me if any questions.          Madonna Dent MD

## 2022-08-15 ENCOUNTER — APPOINTMENT (INPATIENT)
Dept: RADIOLOGY | Facility: HOSPITAL | Age: 87
DRG: 717 | End: 2022-08-15
Attending: STUDENT IN AN ORGANIZED HEALTH CARE EDUCATION/TRAINING PROGRAM
Payer: MEDICARE

## 2022-08-15 PROBLEM — E87.5 HYPERKALEMIA: Status: ACTIVE | Noted: 2022-08-15

## 2022-08-15 LAB
ALBUMIN SERPL-MCNC: 1.6 G/DL (ref 3.4–5)
ALP SERPL-CCNC: 94 IU/L (ref 35–126)
ALT SERPL-CCNC: 28 IU/L (ref 16–63)
ANION GAP SERPL CALC-SCNC: 4 MEQ/L (ref 3–15)
ANION GAP SERPL CALC-SCNC: 5 MEQ/L (ref 3–15)
ANION GAP SERPL CALC-SCNC: 6 MEQ/L (ref 3–15)
AST SERPL-CCNC: 76 IU/L (ref 15–41)
BILIRUB SERPL-MCNC: 0.6 MG/DL (ref 0.3–1.2)
BUN SERPL-MCNC: 35 MG/DL (ref 8–20)
BUN SERPL-MCNC: 38 MG/DL (ref 8–20)
BUN SERPL-MCNC: 41 MG/DL (ref 8–20)
CA-I BLD-SCNC: 1.2 MMOL/L (ref 1.15–1.27)
CALCIUM SERPL-MCNC: 7.6 MG/DL (ref 8.9–10.3)
CALCIUM SERPL-MCNC: 7.9 MG/DL (ref 8.9–10.3)
CALCIUM SERPL-MCNC: 8 MG/DL (ref 8.9–10.3)
CHLORIDE SERPL-SCNC: 117 MEQ/L (ref 98–109)
CHLORIDE SERPL-SCNC: 119 MEQ/L (ref 98–109)
CHLORIDE SERPL-SCNC: 120 MEQ/L (ref 98–109)
CO2 SERPL-SCNC: 23 MEQ/L (ref 22–32)
CO2 SERPL-SCNC: 24 MEQ/L (ref 22–32)
CO2 SERPL-SCNC: 26 MEQ/L (ref 22–32)
CREAT SERPL-MCNC: 0.8 MG/DL (ref 0.8–1.3)
CREAT SERPL-MCNC: 0.9 MG/DL (ref 0.8–1.3)
CREAT SERPL-MCNC: 0.9 MG/DL (ref 0.8–1.3)
ERYTHROCYTE [DISTWIDTH] IN BLOOD BY AUTOMATED COUNT: 16.2 % (ref 11.6–14.4)
GFR SERPL CREATININE-BSD FRML MDRD: >60 ML/MIN/1.73M*2
GLUCOSE BLD-MCNC: 171 MG/DL (ref 70–99)
GLUCOSE BLD-MCNC: 216 MG/DL (ref 70–99)
GLUCOSE BLD-MCNC: 219 MG/DL (ref 70–99)
GLUCOSE BLD-MCNC: 228 MG/DL (ref 70–99)
GLUCOSE BLD-MCNC: 229 MG/DL (ref 70–99)
GLUCOSE BLD-MCNC: 240 MG/DL (ref 70–99)
GLUCOSE BLD-MCNC: 247 MG/DL (ref 70–99)
GLUCOSE BLD-MCNC: 256 MG/DL (ref 70–99)
GLUCOSE BLD-MCNC: 286 MG/DL (ref 70–99)
GLUCOSE SERPL-MCNC: 247 MG/DL (ref 70–99)
GLUCOSE SERPL-MCNC: 256 MG/DL (ref 70–99)
GLUCOSE SERPL-MCNC: 302 MG/DL (ref 70–99)
HCT VFR BLDCO AUTO: 32.7 % (ref 40.1–51)
HGB BLD-MCNC: 10.5 G/DL (ref 13.7–17.5)
INR PPP: 1.4
MAGNESIUM SERPL-MCNC: 2 MG/DL (ref 1.8–2.5)
MAGNESIUM SERPL-MCNC: 2.2 MG/DL (ref 1.8–2.5)
MCH RBC QN AUTO: 35 PG (ref 28–33.2)
MCHC RBC AUTO-ENTMCNC: 32.1 G/DL (ref 32.2–36.5)
MCV RBC AUTO: 109 FL (ref 83–98)
PDW BLD AUTO: 11.3 FL (ref 9.4–12.4)
PHOSPHATE SERPL-MCNC: 2.1 MG/DL (ref 2.4–4.7)
PLATELET # BLD AUTO: 106 K/UL (ref 150–350)
POCT TEST: ABNORMAL
POTASSIUM SERPL-SCNC: 3.3 MEQ/L (ref 3.6–5.1)
POTASSIUM SERPL-SCNC: 3.5 MEQ/L (ref 3.6–5.1)
POTASSIUM SERPL-SCNC: 6.2 MEQ/L (ref 3.6–5.1)
PROT SERPL-MCNC: 5.4 G/DL (ref 6–8.2)
PROTHROMBIN TIME: 16.7 SEC (ref 12.2–14.5)
RBC # BLD AUTO: 3 M/UL (ref 4.5–5.8)
SODIUM SERPL-SCNC: 146 MEQ/L (ref 136–144)
SODIUM SERPL-SCNC: 148 MEQ/L (ref 136–144)
SODIUM SERPL-SCNC: 150 MEQ/L (ref 136–144)
WBC # BLD AUTO: 8.94 K/UL (ref 3.8–10.5)

## 2022-08-15 PROCEDURE — 63600000 HC DRUGS/DETAIL CODE: Performed by: STUDENT IN AN ORGANIZED HEALTH CARE EDUCATION/TRAINING PROGRAM

## 2022-08-15 PROCEDURE — 63600000 HC DRUGS/DETAIL CODE: Performed by: INTERNAL MEDICINE

## 2022-08-15 PROCEDURE — 82330 ASSAY OF CALCIUM: CPT | Performed by: SURGERY

## 2022-08-15 PROCEDURE — 99024 POSTOP FOLLOW-UP VISIT: CPT | Performed by: COLON & RECTAL SURGERY

## 2022-08-15 PROCEDURE — 63700000 HC SELF-ADMINISTRABLE DRUG: Performed by: STUDENT IN AN ORGANIZED HEALTH CARE EDUCATION/TRAINING PROGRAM

## 2022-08-15 PROCEDURE — 25800000 HC PHARMACY IV SOLUTIONS: Performed by: STUDENT IN AN ORGANIZED HEALTH CARE EDUCATION/TRAINING PROGRAM

## 2022-08-15 PROCEDURE — 99233 SBSQ HOSP IP/OBS HIGH 50: CPT | Performed by: INTERNAL MEDICINE

## 2022-08-15 PROCEDURE — 84100 ASSAY OF PHOSPHORUS: CPT | Performed by: STUDENT IN AN ORGANIZED HEALTH CARE EDUCATION/TRAINING PROGRAM

## 2022-08-15 PROCEDURE — 36415 COLL VENOUS BLD VENIPUNCTURE: CPT | Performed by: SURGERY

## 2022-08-15 PROCEDURE — 63600000 HC DRUGS/DETAIL CODE: Performed by: SURGERY

## 2022-08-15 PROCEDURE — 92526 ORAL FUNCTION THERAPY: CPT | Mod: GN

## 2022-08-15 PROCEDURE — 71045 X-RAY EXAM CHEST 1 VIEW: CPT

## 2022-08-15 PROCEDURE — 85027 COMPLETE CBC AUTOMATED: CPT | Performed by: STUDENT IN AN ORGANIZED HEALTH CARE EDUCATION/TRAINING PROGRAM

## 2022-08-15 PROCEDURE — 82310 ASSAY OF CALCIUM: CPT | Performed by: STUDENT IN AN ORGANIZED HEALTH CARE EDUCATION/TRAINING PROGRAM

## 2022-08-15 PROCEDURE — 25000000 HC PHARMACY GENERAL: Performed by: INTERNAL MEDICINE

## 2022-08-15 PROCEDURE — 83735 ASSAY OF MAGNESIUM: CPT | Performed by: SURGERY

## 2022-08-15 PROCEDURE — 25000000 HC PHARMACY GENERAL: Performed by: SURGERY

## 2022-08-15 PROCEDURE — 83735 ASSAY OF MAGNESIUM: CPT | Performed by: STUDENT IN AN ORGANIZED HEALTH CARE EDUCATION/TRAINING PROGRAM

## 2022-08-15 PROCEDURE — 80053 COMPREHEN METABOLIC PANEL: CPT | Performed by: STUDENT IN AN ORGANIZED HEALTH CARE EDUCATION/TRAINING PROGRAM

## 2022-08-15 PROCEDURE — 25000000 HC PHARMACY GENERAL: Performed by: STUDENT IN AN ORGANIZED HEALTH CARE EDUCATION/TRAINING PROGRAM

## 2022-08-15 PROCEDURE — 99233 SBSQ HOSP IP/OBS HIGH 50: CPT | Performed by: SURGERY

## 2022-08-15 PROCEDURE — 20000000 HC ROOM AND CARE ICU

## 2022-08-15 PROCEDURE — 85610 PROTHROMBIN TIME: CPT | Performed by: STUDENT IN AN ORGANIZED HEALTH CARE EDUCATION/TRAINING PROGRAM

## 2022-08-15 PROCEDURE — 99232 SBSQ HOSP IP/OBS MODERATE 35: CPT | Performed by: INTERNAL MEDICINE

## 2022-08-15 PROCEDURE — 80048 BASIC METABOLIC PNL TOTAL CA: CPT | Performed by: STUDENT IN AN ORGANIZED HEALTH CARE EDUCATION/TRAINING PROGRAM

## 2022-08-15 PROCEDURE — 63600000 HC DRUGS/DETAIL CODE

## 2022-08-15 PROCEDURE — 93005 ELECTROCARDIOGRAM TRACING: CPT | Performed by: STUDENT IN AN ORGANIZED HEALTH CARE EDUCATION/TRAINING PROGRAM

## 2022-08-15 RX ORDER — FUROSEMIDE 10 MG/ML
40 INJECTION INTRAMUSCULAR; INTRAVENOUS ONCE
Status: COMPLETED | OUTPATIENT
Start: 2022-08-15 | End: 2022-08-15

## 2022-08-15 RX ORDER — DEXTROSE 50 % IN WATER (D50W) INTRAVENOUS SYRINGE
25 ONCE
Status: ACTIVE | OUTPATIENT
Start: 2022-08-15 | End: 2022-08-16

## 2022-08-15 RX ORDER — CALCIUM GLUCONATE 20 MG/ML
1 INJECTION, SOLUTION INTRAVENOUS ONCE
Status: COMPLETED | OUTPATIENT
Start: 2022-08-15 | End: 2022-08-15

## 2022-08-15 RX ORDER — DEXTROSE MONOHYDRATE 100 MG/ML
INJECTION, SOLUTION INTRAVENOUS AS NEEDED
Status: DISPENSED | OUTPATIENT
Start: 2022-08-15 | End: 2022-08-16

## 2022-08-15 RX ORDER — LEVOTHYROXINE SODIUM 100 UG/1
100 TABLET ORAL
Status: DISCONTINUED | OUTPATIENT
Start: 2022-08-17 | End: 2022-08-19

## 2022-08-15 RX ORDER — INSULIN ASPART 100 [IU]/ML
5 INJECTION, SOLUTION INTRAVENOUS; SUBCUTANEOUS ONCE
Status: COMPLETED | OUTPATIENT
Start: 2022-08-15 | End: 2022-08-15

## 2022-08-15 RX ORDER — LEVOTHYROXINE SODIUM ANHYDROUS 100 UG/5ML
70 INJECTION, POWDER, LYOPHILIZED, FOR SOLUTION INTRAVENOUS
Status: COMPLETED | OUTPATIENT
Start: 2022-08-15 | End: 2022-08-16

## 2022-08-15 RX ORDER — POTASSIUM CHLORIDE 29.8 MG/ML
40 INJECTION INTRAVENOUS ONCE
Status: COMPLETED | OUTPATIENT
Start: 2022-08-15 | End: 2022-08-16

## 2022-08-15 RX ADMIN — INSULIN HUMAN 10 UNITS: 100 INJECTION, SOLUTION PARENTERAL at 19:56

## 2022-08-15 RX ADMIN — INSULIN HUMAN 4 UNITS: 100 INJECTION, SOLUTION PARENTERAL at 23:43

## 2022-08-15 RX ADMIN — COLLAGENASE SANTYL 1 APPLICATION.: 250 OINTMENT TOPICAL at 06:18

## 2022-08-15 RX ADMIN — POTASSIUM PHOSPHATE, MONOBASIC POTASSIUM PHOSPHATE, DIBASIC 20 MMOL: 224; 236 INJECTION, SOLUTION, CONCENTRATE INTRAVENOUS at 12:38

## 2022-08-15 RX ADMIN — CEFEPIME 2 G: 2 INJECTION, POWDER, FOR SOLUTION INTRAVENOUS at 23:39

## 2022-08-15 RX ADMIN — DAKIN'S SOLUTION 0.125% (QUARTER STRENGTH): 0.12 SOLUTION at 06:19

## 2022-08-15 RX ADMIN — DAKIN'S SOLUTION 0.125% (QUARTER STRENGTH): 0.12 SOLUTION at 18:28

## 2022-08-15 RX ADMIN — HEPARIN SODIUM 5000 UNITS: 5000 INJECTION, SOLUTION INTRAVENOUS; SUBCUTANEOUS at 22:01

## 2022-08-15 RX ADMIN — INSULIN HUMAN 4 UNITS: 100 INJECTION, SOLUTION PARENTERAL at 17:16

## 2022-08-15 RX ADMIN — CEFEPIME 2 G: 2 INJECTION, POWDER, FOR SOLUTION INTRAVENOUS at 00:56

## 2022-08-15 RX ADMIN — CALCIUM GLUCONATE 1 G: 20 INJECTION, SOLUTION INTRAVENOUS at 19:45

## 2022-08-15 RX ADMIN — FUROSEMIDE 40 MG: 10 INJECTION, SOLUTION INTRAMUSCULAR; INTRAVENOUS at 11:38

## 2022-08-15 RX ADMIN — INSULIN HUMAN 4 UNITS: 100 INJECTION, SOLUTION PARENTERAL at 11:43

## 2022-08-15 RX ADMIN — HEPARIN SODIUM 5000 UNITS: 5000 INJECTION, SOLUTION INTRAVENOUS; SUBCUTANEOUS at 14:42

## 2022-08-15 RX ADMIN — POTASSIUM CHLORIDE 40 MEQ: 400 INJECTION, SOLUTION INTRAVENOUS at 22:29

## 2022-08-15 RX ADMIN — INSULIN ASPART 5 UNITS: 100 INJECTION, SOLUTION INTRAVENOUS; SUBCUTANEOUS at 09:05

## 2022-08-15 RX ADMIN — CEFEPIME 2 G: 2 INJECTION, POWDER, FOR SOLUTION INTRAVENOUS at 11:39

## 2022-08-15 RX ADMIN — LEVOTHYROXINE SODIUM 70 MCG: 20 INJECTION, SOLUTION INTRAVENOUS at 20:33

## 2022-08-15 RX ADMIN — INSULIN HUMAN 3 UNITS: 100 INJECTION, SOLUTION PARENTERAL at 17:16

## 2022-08-15 RX ADMIN — COLLAGENASE SANTYL 1 APPLICATION.: 250 OINTMENT TOPICAL at 18:29

## 2022-08-15 RX ADMIN — INSULIN HUMAN 3 UNITS: 100 INJECTION, SOLUTION PARENTERAL at 11:42

## 2022-08-15 RX ADMIN — METOPROLOL TARTRATE 12.5 MG: 25 TABLET, FILM COATED ORAL at 08:53

## 2022-08-15 RX ADMIN — HEPARIN SODIUM 5000 UNITS: 5000 INJECTION, SOLUTION INTRAVENOUS; SUBCUTANEOUS at 04:54

## 2022-08-15 RX ADMIN — PANTOPRAZOLE SODIUM 40 MG: 40 INJECTION, POWDER, FOR SOLUTION INTRAVENOUS at 08:54

## 2022-08-15 ASSESSMENT — COGNITIVE AND FUNCTIONAL STATUS - GENERAL
REMEMBERING WHERE THINGS ARE: 3 - A LITTLE
FOLLOWS FAMILIAR CONVERSATION: 3 - A LITTLE
UNDERSTANDING 10 TO 15 MIN SPEECH: 3 - A LITTLE
REMEMBERING TO TAKE MEDICATION: 2 - A LOT
TAKING CARE OF COMPLICATED TASKS: 1 - UNABLE
AFFECT: CONFUSED
REMEMBERING 5 ERRANDS WITH NO LIST: 2 - A LOT

## 2022-08-15 NOTE — PROGRESS NOTES
"Nutrition Assessment    Recommendations  1. Recommend hold TF x3 days for calorie count (8/15-8/17)  2. Diet textures per SLP + no therapeutic - PU4, MT2  3. Add magic cup TID  4. F/u 8/18 with calorie count resutls    Clinical Course: Patient is a 89 y.o. 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     Past Medical History:   Diagnosis Date   • Atrial fibrillation (CMS/HCC)    • Disease of thyroid gland    • GI (gastrointestinal bleed)    • Hypertension    • Myocardial infarction (CMS/HCC)      Past Surgical History:   Procedure Laterality Date   • BYPASS GRAFT         Reason for Assessment  Reason For Assessment: identified at risk by screening criteria ([facility])  Patient Already Seen On: 08/12/22     [facility] Nutrition Screen Tool  Has patient lost weight without trying?: 2-->Unsure  If yes,how much weight has been lost?: 2-->Unsure  Has patient been eating poorly due to decreased appetite?: 0-->No  [facility] Nutrition Screen Score: 4     Nutrition/Diet History  Intake (%): 25%     Physical Findings  Overall Physical Appearance: on oxygen therapy (off vent)  Gastrointestinal: colostomy (-50ml)  Last Bowel Movement: 08/15/22  Tubes: Nasogastric tube  Skin: surgical incision, edema (+2-3)     Nutrition Order  Nutrition Order: meets nutritional requirements  Nutrition Order Comments: peptamen 65ml, 110 flush, PU4, MT2     Anthropometrics  Height: 185.4 cm (6' 1\")     Current Weight  Weight Method: Bed scale  Weight: 80.3 kg (177 lb)     Ideal Body Weight (IBW)  Ideal Body Weight (IBW) (kg): 84.86  % Ideal Body Weight: 94.61       Body Mass Index (BMI)  BMI (Calculated): 23.4     Labs/Procedures/Meds  Lab Results Reviewed: reviewed, pertinent   BMP Results       08/15/22 08/14/22 08/14/22     0438 1753 0544     151 152       152    K 3.5 3.7 4.1       4.1    Cl 120 122 121       121    CO2 26 24 23       23    Glucose 256 211 162       162    BUN 41 41 44       44    " Creatinine 0.9 0.9 1.0       1.0    Calcium 7.9 8.0 8.0       8.0    Anion Gap 4 5 8       8    EGFR >60.0 >60.0 >60.0       >60.0        Results from last 7 days   Lab Units 08/15/22  0438 08/14/22  0544 08/13/22  0555   MAGNESIUM mg/dL 2.2 2.3 2.2     Lab Results   Component Value Date    CALCIUM 7.9 (L) 08/15/2022    PHOS 2.1 (L) 08/15/2022         Medications  Pertinent Medications Reviewed: reviewed, pertinent   • cefepime  2 g intravenous q12h INT   • collagenase  1 application Topical Daily   • heparin (porcine)  5,000 Units subcutaneous q8h IVÁN   • insulin regular U-100  3-5 Units subcutaneous q6h   • insulin regular  4 Units subcutaneous q6h IVÁN   • [START ON 8/16/2022] lansoprazole  30 mg feeding tube Daily   • metoprolol tartrate  12.5 mg oral BID   • potassium phosphate  20 mmol intravenous Once   • sodium hypochlorite   Topical Daily     Calorie Requirements  Estimated kCal Needs: Actual Body Weight  Estimated Calorie Need Method: kcal/kg  Calorie/kg Recommended: 22-25  Calorie Recommendations: 5797-6478     Protein Requirements  Recommended Dosing Weight (Estimated Protein Needs): Actual Body Weight  Est Protein Requirement Amount (gms/kg):  (1.2-1.5)  Protein Recommendations:      Fluid requirements 2000 ml (25 ml/kg)    Dosing weight 80 kg ABW    Tube Feeding Assessment  Active Tube Feed Order: Yes  Delivery Method: Continuous per pump  TF Protein Grams: 119 grams  TF Total kCals: 1872 kcals  Projected Tube Feed Volume: 1560  TF Free Water: 1270    Clinical comments: Patient seen for f/u. Extubated 8/12. PO diet advanced - TF running at full goal. RD d/w resident regarding holding TF with calorie count to support PO. Calorie count ordered for 8/15-8/17; if fails, plan to discuss PEG w/ family. Patient seen this AM, ate  100% yogurt and a few bites of his pureed food. RN reports pt did have some coughing this AM. RD provided calorie count sheets to RN and will add magic cup TID to support  PO.    If concern for dysphagia during calorie count and SLP recs NPO: restart TF of:. Nutren 1.5 at 50 ml/hr, 1 pack prosource = 1860 kcal (23 kcal/kg ABW), 97 g protein (1.2 g/kg ABW), 917 ml water in TF. Maintenance water flush 45 ml/hr =  total 1997 ml (25 ml/kg ABW)      Goals: meet > 75% needs via PO intakes  Monitor: PO intakes, skin, labs, diet, plan of care    Recommendations: See above     PES  Statement: PES Statement  Nutrition Diagnosis: Inadequate Energy Intake  Related To:: Decreased ability to consume sufficient energy  As Evidenced By:: intubated/ NPO  Nutritional Needs Met?: Yes, Needs have been recalculated                                   Date: 08/15/22  Signature: Nancy Summers RD

## 2022-08-15 NOTE — PROGRESS NOTES
Patient:  Samy Elena  Location:  [facility] SICU SICU12  MRN:  [id number]  Today's date:  8/15/2022  Speech Pathology: Therapy session    SLP Diagnosis: Moderate oral stage dysphagia and suspected pharyngeal stage dysphagia, dysphonia, dysarthria    Recommendations:  1. Conservative initiation of L4-pureed solids/l2-mildly thick liquids-pt benefits from straw placement on left side corner of oral cavity, medication whole in puree  2.Aspiration precautions including 1;1 supervision, small single bites and sips, slow rate of ingestion, assistance with self-feeding  3. GERD precautions including upright posture during/after meals   4. Patient remains at risk for silent aspiration in setting of prolonged intubation. Recommend close monitoring for tolerance. If the pt shows increased s/sx of aspiration, change in status, increased oxygen demands please d/c oral diet and notify SLP.   5. Could consider neurology consult as there is unclear etiology of patient's dysarthria  6. SLP for close follow-up to assess tolerance of diet, determine if objective assessment of swallow is warranted.       Summary/Handoff:  Daily Outcome Statement: Dysphagia follow-up completed at the bedside, RN reports good tolerance of breakfast this AM aside from single cough. Per RN, pt with baseline coughing observed. Patient currently receiving fluids via DHT.  Patient assessed with purée and mildly thick liquids, patient observed with single cough following large bite of purée, no further signs or symptoms of aspiration noted with extensive trials.  Patient benefits from cyclic ingestion.  P.o. trials conducted with thin liquids, patient showed immediate coughing episode.  At this time, recommend patient remain on level 2 mildly thick liquids/level 4 puréed solids plus moisture adaptation (extra sauces, gravies, dips), continue with aspiration precautions including one-to-one supervision.  Speech therapy for ongoing  "follow-up      Session Notes:     GRBAS: GRBAS Scoring unchanged from previous session.         Dietary Orders   (From admission, onward)             Start     Ordered    08/15/22 1134  Dietary nutrition supplements  Once        Question Answer Comment   Select Supplement (LMC): Magic Cup Vanilla    Meal period Breakfast Lunch Dinner        08/15/22 1133    08/15/22 1049  Calorie count  Once         08/15/22 1048    08/14/22 1242  Tube Feed with Food Peptamen AF; Continuous; 35; 65; please advance 10 q 2 hours; Water; 110; Every 1 hour; Pureed PU4; Mildly Thick MT2; RD/LDN may adjust order; AM Snack  (Tube Feed with Food Diet Orders with insertion and maintenance panels)  Diet effective now        References:    IDDSI Diet reference   Question Answer Comment   Tube Feeding Formula (LMC): Peptamen AF    Administration Type Continuous    Tube Feeding Start rate (mL/hr): 35    Tube Feeding Goal rate (mL/hr): 65    Nursing Instruction please advance 10 q 2 hours    Flush type: Water    Flush amount (mL): 110    Flush frequency: Every 1 hour    Diet Texture Pureed PU4    Fluid Consistency: Mildly Thick MT2    Delegation of Authority. Diet orders written by PA/CRNPs may not be adjusted by RD/LDNs. RD/LDN may adjust order    Meal period (AM Snack required for CBORD, do not remove: Not clinically relevant) AM Snack       \"And\" Linked Group Details    08/14/22 1242                Results from last 7 days   Lab Units 08/15/22  0438 08/14/22  0544 08/13/22  0555   WBC K/uL 8.94 9.62 10.26   HEMOGLOBIN g/dL 10.5* 11.3* 11.6*   HEMATOCRIT % 32.7* 35.0* 35.6*   PLATELETS K/uL 106* 108* 93*          RN cleared SLP to assess/work with patient as no medical issues identified to preclude this therapy session. Following completion of session, pt remained in bed as they were found with call bell in reach.  Nurse was made aware of patients performance and any changes in status (if applicable).      Samy is a 89 y.o. 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    Past Medical History  Samy has a past medical history of Atrial fibrillation (CMS/HCC), Disease of thyroid gland, GI (gastrointestinal bleed), Hypertension, and Myocardial infarction (CMS/HCC).    History of Present Illness   Admit from OP MD's office 8/2 for denise's gangrene. He proceeded to the OR shortly after admission (8/3) for debridement.  He was then admitted to the surgical ICU where he was intubated and had transient pressor requirements. ETT 8/3-8/12, extubated HHFNC 50L, [id number]%. NPO      SLP Pain    Date/Time Pain Type Rating: Rest Rating: Activity [facility]   08/15/22 0916 Pain Assessment 0 - no pain 0 - no pain KAYLENE            Prior Level of Function    Flowsheet Row Most Recent Value   Eating independent   Communication difficulty speaking (not related to language barrier)  [per RN, pt's famiyl reports dysarthria PTA]   Swallowing difficulty swallowing foods   Baseline Diet/Method of Nutritional Intake no diet restrictions   Past History of Dysphagia Pt with no hx of dysphagia prior to admission   Assistive Device Currently Used at Home cane, straight, walker, front-wheeled           SLP Evaluation and Treatment - 08/15/22 0916        SLP Time Calculation    Start Time 0916     Stop Time 0931     Time Calculation (min) 15 min        Session Details    Document Type daily treatment/progress note     Mode of Treatment speech language pathology;individual therapy        General Information    Patient Profile Reviewed yes     Patient/Family/Caregiver Comments/Observations RN reports pt has baseline cough, cough x1 during intake this day. She reports pt took pill whole in puree     General Observations of Patient Pt assessed in bed [facility] current diet, trials of thins     Existing Precautions/Restrictions aspiration;fall;modified diet;oxygen therapy device and L/min   +DHT    Limitations/Impairments  safety/cognitive;swallowing        Cognition/Psychosocial    Affect/Mental Status (Cognition) confused     Behavioral Issues (Cognition) withdrawn     Orientation Status (Cognition) oriented to;person;place;situation   not  for self    Comment, Cognition Pt is pleasant, confused. Able to participate in ST session        Vocal Quality/Secretion Management (Oral Motor)    Vocal Quality (Oral Motor) hypophonic        Motor Speech    Comment, Motor Speech Intervention dysarthria-baseline        Auditory Comprehension    Comment, Intervention (Auditory Comprehension) Pt is pleasant and cooperative, able to follow simple commands, answer simple questions, higher-level deficits present?        Verbal Expression    Comment, Intervention (Verbal Expression) Pt is pleasant and cooperative,some confusion observed. Delayed reposnes noted        Functional Communication Measures    FCM: Swallowing 4-->Level 4        General Swallowing Observations    Current Diet/Method of Nutritional Intake mildly thick liquids (MT2);pureed (PU4)     Signs/Symptoms of Aspiration (Current Diet) cough     Respiratory Support (General Swallowing Observations) nasal cannula;supplemental oxygen   3L    Comment, Secretions/Suctioning unremarkable     Comment, General Swallowing Observations Pt assessed w/ current diet, trials of thins        Food and Liquid Trials (NIS)    Patient Positioning HOB elevated (specify degrees)     Oral Intake/Feeding Performance slow/difficult self-feeding;oral trials administered by therapist     Liquid Consistencies Evaluated thin liquids;mildly thick liquids (MT2)     Thin Liquids impaired;straw sips     Comment, Thin Liquids single straw sip-coughign episode     Mildly Thick Liquids (MT2) straw sips     Comment, Mildly Thick Liquids (MT2) no overt s/sx of aspiration, clear VQ throughout     Food Consistencies Evaluated pureed (PU4)     Pureed (PU4) use of teaspoon/fork     Comment, Pureed (PU4) pt with single  instance of cough (larger bite, multiple in a row), extensive ongoing trials-no further s/sx of aspiration.     Oral Preparatory Phase of Swallow moderate impairment;mouth opening ROM decreased;lip seal impaired;loss of bolus/oral leakage, left side;oral holding of bolus     Oral Phase of Swallow moderate impairment;tongue movement and elevation reduced;delayed anterior-posterior transit;suspect posterior bolus leak     Pharyngeal Phase of Swallow delayed swallow reflex initiation;multiple swallows per bolus;coughing after swallow     Comment, Pharyngeal Phase cough with thin liquids, cough x1 w/ large bite of puree, extensive ongoing trials w/ puree without s/sx of aspiration     Esophageal Phase of Swallow no clinical symptoms     Comment ongoing c/f reduced lingual and labial coordiantion and control        Swallowing Recommendations    Diet Consistency Recommendations mildly thick liquids (MT2);pureed (PU4)     Medication Administration whole with pureed     Supervision Level for Intake 1:1 supervision needed     Feeding/Delivery Recommendations allow patient to feed self if maintaining safety;eliminate all distractions;small bites/sips;stop meal if showing signs of aspiration or fatigue (e.g., coughing, wet voice)     Posture Recommendations fully upright in chair/chair mode of bed     Swallowing Strategies alternate food and liquid intake     Instrumental Assessment Recommendations reassess via non-instrumental clinical swallow evaluation     Comment, Swallowing Recommendations Aspiration and GERD precautions including 1:1 supervision, small single bites and sips, slow rate of ingestion. The pt remains at risk for silent aspiration in setting of prolonged intubation. If the pt shows s/sx of aspiration, change in status, increased oxygen demands please d/c oral diet and notfiy SLP.        Swallowing Intervention    Dysphagia/Swallowing Interventions monitor tolerance of;current diet without evidence of  aspiration;advanced diet/liquid texture trials        AM-PAC (TM) - Cognition (Current Function)    Following/understanding a 10-15 minute speech or presentation? 3 - A little     Understanding familiar people during ordinary conversations? 3 - A little     Remembering to take medications at the appropriate time? 2 - A lot     Remembering where things were placed or put away? 3 - A little     Remembering a list of 3 or 4 errands without writing it down? 2 - A lot     Taking care of complicated tasks? 1 - Unable     AM-PAC (TM) Cognition Score 14        Assessment/Plan (SLP)    Daily Outcome Statement Dysphagia follow-up completed at the bedside, RN reports good tolerance of breakfast this AM aside from single cough. Per RN, pt with baseline coughing observed. Patient currently receiving fluids via DHT.  Patient assessed with purée and mildly thick liquids, patient observed with single cough following large bite of purée, no further signs or symptoms of aspiration noted with extensive trials.  Patient benefits from cyclic ingestion.  P.o. trials conducted with thin liquids, patient showed immediate coughing episode.  At this time, recommend patient remain on level 2 mildly thick liquids/level 4 puréed solids plus moisture adaptation (extra sauces, gravies, dips), continue with aspiration precautions including one-to-one supervision.  Speech therapy for ongoing follow-up    Rehab Potential fair, will monitor progress closely     Therapy Frequency 3-5 times/wk     Planned Therapy Interventions dysphagia therapy               SLP Assessment/Plan    Flowsheet Row Most Recent Value   SLP Diagnosis Moderate oral stage dysphagia and suspected pharyngeal stage dysphagia, dysphonia, dysarthria at 08/15/2022 0916   Patient/Family Therapy Goal Statement to drink water at 08/15/2022 0916                    SLP Goals    Flowsheet Row Most Recent Value   Oral Nutrition/Hydration Goal 1    Activity effective/safe/independent, oral  nutrition/hydration, use of swallowing techniques, management of texture/viscosity at 08/13/2022 1026   Time Frame long-term goal (LTG), by discharge at 08/13/2022 1026   Strategies/Barriers ETT x9 days, high oxygen demands at 08/13/2022 1026   Progress/Outcome goal ongoing at 08/15/2022 0916

## 2022-08-15 NOTE — PLAN OF CARE
Problem: Adult Inpatient Plan of Care  Goal: Plan of Care Review  Outcome: Progressing   Per SICU rounds:      - s/p diverting colostomy 8/3     - extubated 8/12 after GOC discussion w family after they iundicated pt would not want trach or PEG: now DNI     - abx will be finished tomorrow     - TF at goal; cleared for thins/ puree     - on [id number], wound packing bid       - plan: PT/OT, continue supportive care- if pt declines family wants comfort care     - for possible tsf out of SICU today     - dispo TBD

## 2022-08-15 NOTE — PROGRESS NOTES
I entered Mr. Elena's room and was welcomed in by his daughter-in-law. Patient was sleeping and duaghter-in-law declined any [facility] needs at this time. I provided the [facility] business card and encouraged them to reach out if any future needs arise.    Chaplain Vonnie Intern  , [id number]

## 2022-08-15 NOTE — PROGRESS NOTES
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    # Debility  The patient remains in the SICU but has made significant improvements. He is now extubated, eating, and conversing. He denies any bothersome symptoms. He has had some evidence of delirium per the chart. The plan is clear and the patient is more stable. Therefore, we will sign off at this time. If the patient decompensates, the family would pivot to comfort care. If that happens, please reconsult us for assistance with symptom management.     - goals are restorative with limits in escalation of care  - DNR/DNI  - signing off, discussed with SICU resident    # Pain  - acetaminophen 650 mg q4hr prn  - hydromorphone IV 0.25 mg q3hr prn         Franc Givens MD        Subjective  Patient awake and alert. Denies any bothersome symptoms.     Review of Systems:  All other systems reviewed and negative except as noted above    Objective    Current Medications:  •  acetaminophen, 650 mg, oral, q4h PRN  •  cefepime, 2 g, intravenous, q12h INT  •  collagenase, 1 application, Topical, Daily  •  glucose, 16-32 g of dextrose, oral, PRN **OR** dextrose, 15-30 g of dextrose, oral, PRN **OR** glucagon, 1 mg, intramuscular, PRN **OR** dextrose 50 % in water (D50), 25 mL, intravenous, PRN  •  furosemide, 40 mg, intravenous, Once  •  heparin (porcine), 5,000 Units, subcutaneous, q8h IVÁN  •  HYDROmorphone, 0.25 mg, intravenous, q3h PRN  •  insulin regular U-100, 3-5 Units, subcutaneous, q6h  •  insulin regular, 4 Units, subcutaneous, q6h IVÁN  •  [START ON 8/16/2022] lansoprazole, 30 mg, feeding tube, Daily  •  metoprolol tartrate, 12.5 mg, oral, BID  •  potassium phosphate, 20 mmol, intravenous, Once  •  sodium hypochlorite, , Topical, Daily    Palliative Performance Score: 40    Physical Exam:  Physical Exam  Constitutional:       General: He is not in acute distress.  HENT:      Head:  Atraumatic.   Eyes:      Conjunctiva/sclera: Conjunctivae normal.   Neck:      Vascular: No JVD.   Pulmonary:      Effort: No respiratory distress.   Abdominal:      General: There is no distension.   Musculoskeletal:      Comments: sarcopenia   Skin:     General: Skin is warm.   Neurological:      Mental Status: He is alert.   Psychiatric:         Thought Content: Thought content normal.           Vitals:  Vitals:    08/15/22 0900   BP: 138/60   Pulse: 97   Resp:    Temp:    SpO2: 99%       Laboratory Studies:    CBC Results       08/15/22 08/14/22 08/13/22     0438 0544 0555    WBC 8.94 9.62 10.26    RBC 3.00 3.24 3.31    HGB 10.5 11.3 11.6    HCT 32.7 35.0 35.6    .0 108.0 107.6    MCH 35.0 34.9 35.0    MCHC 32.1 32.3 32.6     108 93        CMP Results       08/15/22 08/14/22 08/14/22     0438 1753 0544     151 152       152    K 3.5 3.7 4.1       4.1    Cl 120 122 121       121    CO2 26 24 23       23    Glucose 256 211 162       162    BUN 41 41 44       44    Creatinine 0.9 0.9 1.0       1.0    Calcium 7.9 8.0 8.0       8.0    Anion Gap 4 5 8       8    AST 76 -- 87    ALT 28 -- 32    Albumin 1.6 -- 1.7    EGFR >60.0 >60.0 >60.0       >60.0            Labs reviewed. Notable for alb 1.6     Imaging and Other Studies:     Radiology Imaging    XR CHEST 1 VW    Narrative  CLINICAL HISTORY: intubated   .    PROCEDURE: AP erect portable chest radiograph.    COMPARISON: Chest radiograph of August 13, 2022.    COMMENT:  Support lines, tubes, devices, and surgical hardware: Sternal sutures. Enteric  tube with tip off the inferior margin of the image. Right arm PICC with tip at  cavoatrial junction.  Lungs: Left lower lobe consolidation, unchanged.  Pleura: Bilateral pleural effusions, greater on the right. No pneumothorax  Heart/mediastinum: Stable cardiomegaly.  Bones/soft tissues: Intact.    --    Impression  Left lower lobe consolidation and possible small pleural effusion.  Larger right pleural  effusion.                                                                          Imaging/cardiac studies notable for: R pleural effusion on CXR

## 2022-08-15 NOTE — NURSING NOTE
Patient had 15 beat run v-tach at 10:10, then back to a-fib. Pt AAO, no s/s of physical distress noted. Patient denied SOB/pain. RN paged surgery team to notify,  made aware, no new orders.

## 2022-08-15 NOTE — PROGRESS NOTES
Cardiology Progress Note   [facility]    [facility]  [facility] [address]  [address] [place] [address]    TEL  [phone]  [facility]       Patient: Samy Elena  MRN: [id number]  : 10/6/1930  Admission Date: 2022   Consult Date: 08/15/22  Reason for Consult: H/o HFrEF, PH, atrial fibrillation -postop management   Outpatient Cardiologist: Dr. Yovanny Cruz ( office visit 2022)     Interval History:   Seen and examined early this am.    He looks good and comfortable after extubation.     Denies chest pain or shortness of breath.       HPI:   Samy Elena is a 89 y.o. 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    Cardiology is being consulted for management recommendations given his extensive cardiac history.     Her chart review, the patient had a recent admission at the end of July with changes in speech. CVA was ruled out at the time of that admission .symptoms was attributed to  postural/orthostatic changes related to symptomatic hypotension. Patient was discharged to rehabilitation unit where he developed  buttocks and pubic pain.He was referred to be seen by surgery as outpatient that lead to current admission.       Past Medical History:   Diagnosis Date   • Atrial fibrillation (CMS/HCC)    • Disease of thyroid gland    • GI (gastrointestinal bleed)    • Hypertension    • Myocardial infarction (CMS/HCC)           Past Surgical History:   Procedure Laterality Date   • BYPASS GRAFT         Social History     Socioeconomic History   • Marital status:      Spouse name: Not on file   • Number of children: Not on file   • Years of education: Not on file   • Highest education level: Not on file   Occupational History   • Not on  file   Tobacco Use   • Smoking status: Never Smoker   • Smokeless tobacco: Never Used   Substance and Sexual Activity   • Alcohol use: Not on file   • Drug use: Not on file   • Sexual activity: Not on file   Other Topics Concern   • Not on file   Social History Narrative   • Not on file     Social Determinants of Health     Financial Resource Strain: Not on file   Food Insecurity: No Food Insecurity   • Worried About Running Out of Food in the Last Year: Never true   • Ran Out of Food in the Last Year: Never true   Transportation Needs: Not on file   Physical Activity: Not on file   Stress: Not on file   Social Connections: Not on file   Intimate Partner Violence: Not on file   Housing Stability: Not on file        History reviewed. No pertinent family history.     Jardiance [empagliflozin]    Current Outpatient Medications   Medication Instructions   • acetaminophen (TYLENOL) 325 mg tablet oral   • bisacodyL (DULCOLAX) 10 mg suppository rectal   • clopidogreL (PLAVIX) 75 mg, oral, Daily   • coenzyme Q10 (COQ10) 100 mg, oral, Daily   • empagliflozin (JARDIANCE) 10 mg, oral, Daily   • levothyroxine (SYNTHROID) 100 mcg, oral, Daily (6:30a)   • metoprolol succinate XL (TOPROL-XL) 100 mg, oral, Daily   • multivitamin (THERAGRAN) tablet oral, Daily   • sacubitriL-valsartan (ENTRESTO) 49-51 mg per tablet 1 tablet, oral, 2 times daily   • sildenafiL (pulm.hypertension) (REVATIO) 20 mg, oral, 3 times daily   • simvastatin (ZOCOR) 10 mg, oral, Nightly   • spironolactone (ALDACTONE) 25 mg, oral, Daily   • torsemide (DEMADEX) 10-20 mg, oral, Daily, 10 mg if weight is 176-181 lbs, 20 mg if weight is >181 lbs       Scheduled Meds:  • cefepime  2 g intravenous q12h INT   • collagenase  1 application Topical Daily   • heparin (porcine)  5,000 Units subcutaneous q8h IVÁN   • insulin regular U-100  3-5 Units subcutaneous q6h   • insulin regular  4 Units subcutaneous q6h IVÁN   • [START ON 8/16/2022] lansoprazole  30 mg feeding tube  "Daily   • metoprolol tartrate  12.5 mg oral BID   • sodium hypochlorite   Topical Daily     Continuous Infusions:    PRN Meds:.•  acetaminophen  •  glucose **OR** dextrose **OR** glucagon **OR** dextrose 50 % in water (D50)  •  HYDROmorphone      Intake/Output Summary (Last 24 hours) at 8/15/2022 0923  Last data filed at 8/15/2022 0900  Gross per 24 hour   Intake 3460.34 ml   Output 1150 ml   Net 2310.34 ml        Weights (last 7 days)     None           Wt Readings from Last 3 Encounters:   08/03/22 80.3 kg (177 lb)   08/02/22 80.3 kg (177 lb)   07/25/22 81.1 kg (178 lb 11.2 oz)        REVIEW OF SYSTEMS:    A complete review of systems limited by sedation.    PHYSICAL EXAMINATION:  Visit Vitals  /60   Pulse 97   Temp 36.8 °C (98.2 °F) (Bladder)   Resp 18   Ht 1.854 m (6' 1\")   Wt 80.3 kg (177 lb)   SpO2 99%   BMI 23.35 kg/m²     Body surface area is 2.03 meters squared.  Body mass index is 23.35 kg/m².  General: in NAD   HEENT: No corneal arcus or xanthelasmas.  Sclerae are anicteric.   Neck: JVP is elevated; prominent v wave .   Heart: s1s2 audible, no significant murmurs , irregular   Chest: Symmetrical.  Lungs:Breathing sounds audible bl .  Abdomen: soft, nt , colostomy in place   Extremities: No cyanosis or clubbing.+ 1-2 lower extremity edema.  Distal pulses are easily palpable.  Skin: Warm and dry and well perfused.      Labs   Results from last 7 days   Lab Units 08/15/22  0438 08/14/22  1753 08/14/22  0544 08/13/22  1803 08/13/22  0555   SODIUM mEQ/L 150* 151* 152*  152*   < > 148*   POTASSIUM mEQ/L 3.5* 3.7 4.1  4.1   < > 4.2   CHLORIDE mEQ/L 120* 122* 121*  121*   < > 119*   CO2 mEQ/L 26 24 23  23   < > 23   BUN mg/dL 41* 41* 44*  44*   < > 41*   CREATININE mg/dL 0.9 0.9 1.0  1.0   < > 0.9   CALCIUM mg/dL 7.9* 8.0* 8.0*  8.0*   < > 7.9*   ALBUMIN g/dL 1.6*  --  1.7*  --  1.7*   BILIRUBIN TOTAL mg/dL 0.6  --  1.0  --  1.1   ALK PHOS IU/L 94  --  91  --  96   ALT IU/L 28  --  32  --  35   AST " IU/L 76*  --  87*  --  99*   GLUCOSE mg/dL 256* 211* 162*  162*   < > 153*    < > = values in this interval not displayed.       No results found for: HSTROPONINI    Results from last 7 days   Lab Units 08/15/22  0438 08/14/22  0544 08/13/22  0555   WBC K/uL 8.94 9.62 10.26   HEMOGLOBIN g/dL 10.5* 11.3* 11.6*   HEMATOCRIT % 32.7* 35.0* 35.6*   PLATELETS K/uL 106* 108* 93*       Lab Results   Component Value Date    CHOL 94 07/23/2022    HDL 23 (L) 07/23/2022    LDLCALC 63 07/23/2022    TRIG 41 07/23/2022    TSH 9.96 (H) 08/14/2022    HGBA1C 6.3 (H) 07/23/2022       Outside records reviewed..    Imaging  CXR reviewed     Cardiac Studies  TTE 04/13/2022 ( outside report , images not available)     •  A complete transthoracic echocardiogram (including 2D, color flow   Doppler, spectral Doppler and M-mode imaging) was performed using the   standard protocol. The study quality was adequate and poor.  •  The left ventricle is normal in size. Endocardium is incompletely   visualized and segmental wall motion abnormalities cannot be excluded.   Moderately decreased left ventricular ejection fraction. The left   ventricular ejection fraction by visual estimate is 35% to 40%.  •  There is indeterminate diastolic function.  •  The right ventricle is severely dilated. There is moderately to   severely decreased systolic function of the right ventricle.  •  The left atrium is moderately dilated.  •  The right atrium is severely dilated.  •  There is trivial mitral valve leaflet thickening. There is trace mitral   regurgitation. There is no mitral stenosis.  •  The aortic valve is trileaflet. There is mild aortic valve thickening.   There is mild aortic valve calcification. There is no aortic stenosis.   There is mild aortic regurgitation.  •  There is moderate to severe tricuspid regurgitation.  •  The inferior vena cava is dilated (diameter >21 mm) and decreases <50%   in size with inspiration, suggesting an elevated right  atrial pressure of   15 mmHg (range 10-20 mm Hg).  •  There is no prior study available for comparison at this organization.    Left Ventricle  The left ventricle is normal in size. Endocardium is incompletely visualized and segmental wall motion abnormalities cannot be excluded. The left ventricular ejection fraction by visual estimate is 35% to 40%. Moderately decreased left ventricular ejection fraction. There is indeterminate diastolic function.    Right Ventricle  The right ventricle is severely dilated. Off axis imaging of the right ventricle, but visually systolic function appears moderate to severely reduced.    Left Atrium  The left atrium is moderately dilated.    Right Atrium  The right atrium is severely dilated.    IVC/SVC  The inferior vena cava is dilated (diameter >21 mm) and decreases <50% in size with inspiration, suggesting an elevated right atrial pressure of 15 mmHg (range 10-20 mm Hg).    Mitral Valve  There is trivial mitral valve leaflet thickening. There is no mitral stenosis. There is trace mitral regurgitation.    Tricuspid Valve  There is moderate tricuspid annular dilatation. There is no tricuspid stenosis. There is moderate to severe tricuspid regurgitation.    Aortic Valve  The aortic valve is trileaflet. There is mild aortic valve thickening. There is mild aortic valve calcification. There is no aortic stenosis. There is mild aortic regurgitation.    Pulmonic Valve  There is no pulmonic stenosis. There is mild pulmonic valve regurgitation.    Pericardium  There are echocardiographic findings consistent with fat pad.    Aortic Arch/Descending/Abdominal Aorta  The aortic root is normal in size. The proximal segment of the ascending aorta is normal in size.     ECG   07/22/2022   Atrial fibrillation   rbbb   twi on precordial leads, present on ecg 07/2022     Telemetry  Atrial fibrillation , rbbb with ventricular response in 80-90    Assessment:  This is a 89 y.o. male being consulted  for cardiovascular management    #Jusitn's gangrene:   #Septic shock:     Management per primary service   On abx; id has been following   empagliflozin should be discontinued at the time of discharge. He was recently started on this agent in may 2022      # iCM:  # HFmrEF:  # PH on sildenafil as outpatient:    NYHA class III and he has ACC/AHA Stage C as outpatient previously     On norepinephrin 12 following hydromorphone and now weaning back down     His lvef appears 40-45% with severe RV dilation and diastolic dysfunction.     S/p septic shock; extubated and off pressors     Please administer IV furosemide 40 mg and re-dose in pm to aim net negative 2 L over the next 24 h   Strict intake and output   Continue holding entresto spironolactone and sildenafil; these can be gradually introduced back within the next 24-48 h     # Permanent atrial fibrillation:     [id number]     Rates in the 80-90    Not on AC as outpatient given history of GIB   continue metoprolol tartrate; increase to 25 mg bid     Case  discussed with Dr. Davila. Final recommendations are pending attending co-signature. Please page Cardiology at [phone] with any questions.     Sabiha Cordon MD  8/15/2022

## 2022-08-15 NOTE — PLAN OF CARE
F/U. Extubated, calorie count ordered- will f/u  Problem: Adult Inpatient Plan of Care  Goal: Plan of Care Review  Outcome: Progressing

## 2022-08-15 NOTE — PROGRESS NOTES
General Surgery Daily Progress Note    Subjective     Interval History: Awake this morning. Has been extubated in 8/12 and weaned down to 3L NC this morning. Has DHT and tolerating feeds @ 65cc/hr (goal).  SLP eval yesterday now on thins and purees.  Ostomy functional for gas and stool.       Objective     Vital signs in last 24 hours:  Temp:  [36.8 °C (98.2 °F)] 36.8 °C (98.2 °F)  Heart Rate:  [] 103  Resp:  [16-18] 18  BP: ()/(55-72) 128/71      Intake/Output Summary (Last 24 hours) at 8/15/2022 0904  Last data filed at 8/15/2022 0400  Gross per 24 hour   Intake 2695.34 ml   Output 900 ml   Net 1795.34 ml     Intake/Output this shift:  No intake/output data recorded.    Physical Exam    General appearance: Arousable, A&O x3, non-toxic appearing but frail appearing  Neck: no JVD  Lungs: No increased work of breathing, on NC 4L.   Heart: No pressors, slightly tachycardic, afib  Abdomen: stoma pink and protruding, productive for gas and liquid brown stool. Abdomen soft, distended, non-tender and not tympanitic.   Extremities: extremities normal, warm and well-perfused; no cyanosis, clubbing, or edema  Pulses: 2+ and symmetric  Skin: Skin color, texture, turgor normal. No rashes or lesions  Neurologic: Grossly normal    VTE Assessment: I have reassessed and the patient's VTE risk and treatment plan is appropriate.    Labs    CBC:   Lab Results   Component Value Date    WBC 8.94 08/15/2022    RBC 3.00 (L) 08/15/2022     BMP:   Lab Results   Component Value Date    GLUCOSE 256 (H) 08/15/2022    CO2 26 08/15/2022    BUN 41 (H) 08/15/2022    CREATININE 0.9 08/15/2022    CALCIUM 7.9 (L) 08/15/2022     Coagulation:   Lab Results   Component Value Date    PT 16.7 (H) 08/15/2022    INR 1.4 08/15/2022       Imaging  I have reviewed the Imaging from the last 24 hrs.      Assessment/Plan      89 y/o male with PMH of biventricular heart failure, HTN, Afib, CAD who is now s/p EUA wound debridement of gluteal NSTI on  8/2 and diverting loop sigmoid colostomy on 8/3.    Extubated on 8/13, cleared for thick liquids and puree.     -Patient may potentially transfer out of the SICU this week. Will return to Dr. Bryant's service once on the floor  -Continue TF @ goal while continuing with puree diet.   -Calorie counts  -Encourage ISS and pulmonary toilet. Wean O2 as tolerates.   -On Cefipime for 14 day course (ends 8/16)  -Insulin gtt -> will wean to ISS once out of ICU.   -PT/OT   -WOCN for new ostomy teaching once on floor.   -Continue BID packing changes with santyl and dakins.     Contact [phone] for questions or concerns  Samy Haddad, DO

## 2022-08-15 NOTE — PLAN OF CARE
Problem: Adult Inpatient Plan of Care  Goal: Plan of Care Review  Outcome: Progressing  Flowsheets (Taken 8/15/2022 4439)  Progress: improving  Plan of Care Reviewed With: patient  Outcome Summary: Pt AAO, following commands. VSS. Great appetite for breakfast and lunch.   Plan of Care Review  Plan of Care Reviewed With: patient  Progress: improving  Outcome Summary: Pt AAO, following commands. VSS. Great appetite for breakfast and lunch.

## 2022-08-15 NOTE — PROGRESS NOTES
SURGICAL CRITICAL CARE DAILY PROGRESS NOTE     PATIENT NAME:  Samy Elena YOB: 1930    AGE:  89 y.o.  GENDER: male   MRN:  [id number]  PATIENT #: [id number]     SUBJECTIVE   No acute acute events overnight. Down to 3L NC. Remains DNR/DNI. Family adament about no reintubation but okay for supportive measures.     Responsive to questions. Off pressors. On 2L NC. Tolerating TF's at goal.     SLP re-eval yesterday, now on thins and purees. Tolerating them well.     Denies chest pain, fever, chills, nausea, vomiting.    VITAL SIGNS     Temperature:   Temp (24hrs), Av.8 °C (98.2 °F), Min:36.7 °C (98.1 °F), Max:36.8 °C (98.2 °F)       Last 24 hours:    Temp:  [36.7 °C (98.1 °F)-36.8 °C (98.2 °F)] 36.8 °C (98.2 °F)  Heart Rate:  [] 94  Resp:  [16-18] 18  BP: ()/(55-72) 123/58    VENT SETTINGS:        INTAKE & OUTPUT     I/O last 3 completed shifts:  In: 2795.3 [I.V.:195.3; NG/GT:2300; IV Piggyback:300]  Out: 1880 [Urine:1830; Stool:50]    Intake/Output Summary (Last 24 hours) at 8/15/2022 0750  Last data filed at 8/15/2022 0400  Gross per 24 hour   Intake 2695.34 ml   Output 1040 ml   Net 1655.34 ml     No intake/output data recorded.     MEDICATIONS     Infusions:             Scheduled:   • cefepime  2 g intravenous q12h INT   • collagenase  1 application Topical Daily   • heparin (porcine)  5,000 Units subcutaneous q8h IVÁN   • insulin regular U-100  3-5 Units subcutaneous Nightly   • metoprolol tartrate  12.5 mg oral BID   • pantoprazole  40 mg intravenous q24h   • sodium hypochlorite   Topical Daily       Antibiotics:  Anti-infectives (From admission, onward)    Start     Dose/Rate Route Frequency Ordered Stop    22 1200  ceFEPIme (MAXIPIME) 2 g in 100 mL NSS vial in bag         2 g  200 mL/hr over 30 Minutes intravenous Every 12 hours interval 22 1110            PRN:     acetaminophen, 650 mg, q4h PRN  glucose, 16-32 g of dextrose, PRN   Or  dextrose, 15-30 g of  dextrose, PRN   Or  glucagon, 1 mg, PRN   Or  dextrose 50 % in water (D50), 25 mL, PRN  HYDROmorphone, 0.25 mg, q3h PRN         DIAGNOSTIC DATA     LABS:  Recent Results (from the past 24 hour(s))   Basic metabolic panel    Collection Time: 08/14/22  5:53 PM   Result Value Ref Range    Sodium 151 (H) 136 - 144 mEQ/L    Potassium 3.7 3.6 - 5.1 mEQ/L    Chloride 122 (H) 98 - 109 mEQ/L    CO2 24 22 - 32 mEQ/L    BUN 41 (H) 8 - 20 mg/dL    Creatinine 0.9 0.8 - 1.3 mg/dL    Glucose 211 (H) 70 - 99 mg/dL    Calcium 8.0 (L) 8.9 - 10.3 mg/dL    eGFR >60.0 >=60.0 mL/min/1.73m*2    Anion Gap 5 3 - 15 mEQ/L   POCT Glucose    Collection Time: 08/14/22 10:39 PM   Result Value Ref Range    POCT Bedside Glucose 229 (H) 70 - 99 mg/dL    POC Test POC    POCT Glucose    Collection Time: 08/15/22  4:36 AM   Result Value Ref Range    POCT Bedside Glucose 229 (H) 70 - 99 mg/dL    POC Test POC    Calcium, ionized    Collection Time: 08/15/22  4:38 AM   Result Value Ref Range    Ionized Calcium, Venous 1.20 1.15 - 1.27 mmol/L   Comprehensive metabolic panel    Collection Time: 08/15/22  4:38 AM   Result Value Ref Range    Sodium 150 (H) 136 - 144 mEQ/L    Potassium 3.5 (L) 3.6 - 5.1 mEQ/L    Chloride 120 (H) 98 - 109 mEQ/L    CO2 26 22 - 32 mEQ/L    BUN 41 (H) 8 - 20 mg/dL    Creatinine 0.9 0.8 - 1.3 mg/dL    Glucose 256 (H) 70 - 99 mg/dL    Calcium 7.9 (L) 8.9 - 10.3 mg/dL    AST (SGOT) 76 (H) 15 - 41 IU/L    ALT (SGPT) 28 16 - 63 IU/L    Alkaline Phosphatase 94 35 - 126 IU/L    Total Protein 5.4 (L) 6.0 - 8.2 g/dL    Albumin 1.6 (L) 3.4 - 5.0 g/dL    Bilirubin, Total 0.6 0.3 - 1.2 mg/dL    eGFR >60.0 >=60.0 mL/min/1.73m*2    Anion Gap 4 3 - 15 mEQ/L   Magnesium    Collection Time: 08/15/22  4:38 AM   Result Value Ref Range    Magnesium 2.2 1.8 - 2.5 mg/dL   Phosphorus    Collection Time: 08/15/22  4:38 AM   Result Value Ref Range    Phosphorus 2.1 (L) 2.4 - 4.7 mg/dL   Protime-INR    Collection Time: 08/15/22  4:38 AM   Result Value Ref  Range    PT 16.7 (H) 12.2 - 14.5 sec    INR 1.4     CBC    Collection Time: 08/15/22  4:38 AM   Result Value Ref Range    WBC 8.94 3.80 - 10.50 K/uL    RBC 3.00 (L) 4.50 - 5.80 M/uL    Hemoglobin 10.5 (L) 13.7 - 17.5 g/dL    Hematocrit 32.7 (L) 40.1 - 51.0 %    .0 (H) 83.0 - 98.0 fL    MCH 35.0 (H) 28.0 - 33.2 pg    MCHC 32.1 (L) 32.2 - 36.5 g/dL    RDW 16.2 (H) 11.6 - 14.4 %    Platelets 106 (L) 150 - 350 K/uL    MPV 11.3 9.4 - 12.4 fL     Serum creatinine: 0.9 mg/dL 08/15/22 0438  Estimated creatinine clearance: 61.7 mL/min  Microbiology Results     Procedure Component Value Units Date/Time    MRSA Screen, Nares Only Nose [[id number]]  (Normal) Collected: 08/02/22 2248    Specimen: Nasal Swab from Nose Updated: 08/03/22 0030     MRSA DNA, Nares Negative    SARS-CoV-2 (COVID-19), PCR Nasopharynx [[id number]]  (Normal) Collected: 08/02/22 1633    Specimen: Nasopharyngeal Swab from Nasopharynx Updated: 08/02/22 1807    Narrative:      The following orders were created for panel order SARS-CoV-2 (COVID-19), PCR Nasopharynx.  Procedure                               Abnormality         Status                     ---------                               -----------         ------                     SARS-CoV-2 (COVID-19), P...[[id number]]  Normal              Final result                 Please view results for these tests on the individual orders.    SARS-CoV-2 (COVID-19), PCR Nasopharynx [[id number]]  (Normal) Collected: 08/02/22 1633    Specimen: Nasopharyngeal Swab from Nasopharynx Updated: 08/02/22 1807     SARS-CoV-2 (COVID-19) Negative    Narrative:      Testing performed using real-time PCR for detection of COVID-19. EUA approved validation studies performed on site.     Anaerobic Culture / Smear (includes Aerobic Culture) [[id number]] Collected: 08/02/22 1536    Specimen: Abscess Fluid from Buttock, Left Updated: 08/02/22 4448     Gram Stain Result 3+ WBC      2+ Gram positive cocci      2+ Gram positive  bacilli    SARS-CoV-2 (COVID-19), PCR Nasopharynx [[id number]]  (Normal) Collected: 07/25/22 1341    Specimen: Nasopharyngeal Swab from Nasopharynx Updated: 07/25/22 1525    Narrative:      The following orders were created for panel order SARS-CoV-2 (COVID-19), PCR Nasopharynx.  Procedure                               Abnormality         Status                     ---------                               -----------         ------                     SARS-CoV-2 (COVID-19), P...[[id number]]  Normal              Final result                 Please view results for these tests on the individual orders.    SARS-CoV-2 (COVID-19), PCR Nasopharynx [[id number]]  (Normal) Collected: 07/25/22 1341    Specimen: Nasopharyngeal Swab from Nasopharynx Updated: 07/25/22 1525     SARS-CoV-2 (COVID-19) Negative    SARS-CoV-2 (COVID-19), PCR Nasopharynx [[id number]]  (Normal) Collected: 07/22/22 2001    Specimen: Nasopharyngeal Swab from Nasopharynx Updated: 07/22/22 2125    Narrative:      The following orders were created for panel order SARS-CoV-2 (COVID-19), PCR Nasopharynx.  Procedure                               Abnormality         Status                     ---------                               -----------         ------                     SARS-CoV-2 (COVID-19), P...[[id number]]  Normal              Final result                 Please view results for these tests on the individual orders.    SARS-CoV-2 (COVID-19), PCR Nasopharynx [[id number]]  (Normal) Collected: 07/22/22 2001    Specimen: Nasopharyngeal Swab from Nasopharynx Updated: 07/22/22 2125     SARS-CoV-2 (COVID-19) Negative    Narrative:      Testing performed using real-time PCR for detection of COVID-19. EUA approved validation studies performed on site.           IMAGING:  No results found.    Radiology Imaging    XR CHEST 1 VW    Narrative  CLINICAL HISTORY: Ataxia.    --    Impression  Findings suspicious for congestive heart failure.    COMMENT: AP radiograph of  the chest was obtained.  We have no prior similar  studies for comparison.  There is cardiomegaly.  There is vascular  congestion/interstitial edema.  There are bilateral effusions.  Findings suggest  congestive heart failure.  Sternal sutures are seen.  There is no pneumothorax.  We have no prior similar studies for comparison.       Lines, Drains, Airways, Wounds:     CVC Triple Lumen 08/02/22 Internal jugular (Active)   Number of days: 1       Peripheral IV (Adult) 07/22/22 Left Forearm (Active)   Number of days: 12       Peripheral IV (Adult) 08/02/22 Right Forearm (Active)   Number of days: 1       Peripheral IV (Adult) 08/02/22 Anterior;Left;Proximal Forearm (Active)   Number of days: 1       NG/OG Tube 08/02/22 Center mouth (Active)   Number of days: 1       Urethral Catheter Temperature probe 16 Fr (Active)   Number of days: 1       ETT  (Active)   Number of days: 1       Arterial Line 08/02/22 Left Radial (Active)   Number of days: 1       Surgical Incision Buttock (Active)   Number of days: 1       PHYSICAL EXAM     General appearance: Sleepy, arousable  Head: normocephalic, without obvious abnormality, atraumatic.  Eyes: negative, no scleral icterus.   Nose: no discharge  Neck: no JVD, symmetrical, trachea midline.   Lungs: No respiratory distress   Chest wall: No deformities or palpable masses  Heart: Regular rate and rhythm.   Abdomen: soft, moderately distended, non-tender.   Ostomy pink, protruding, perfused, productive of gas and stool.  Genitalia: edematous, erythematous. No streaking or crepitous.    Incisions clean, dry, intact. Erythema stable.   Extremities: extremities warm and well-perfused.  Pulses: 2+ and symmetric.  Skin: Skin color, texture, turgor normal.  Neurologic: Unable to assess secondary to sedation.       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 male HD#13 s/p EUA, wound debridement for necrotizing soft tissue infection of the perirectal and perineal tissue on 8/2. 11 Days Post-Op    NEURO: Extubated. Precedex. Dilaudid as needed.     CV: aFib. HR 70-100s. Off Levo. Cuff Pressures. Metop BID. EF 40-45% (8/3).     PULM: Extubated. On 3L NC. DNR/DNI.     GI: DHT. SLP okay for Thins/Purees. PPI. LUQ end-colostomy. Productive gas & stool.  Free water flushes at 75 cc every 2 hours.    : Fc. Cr: 1.0 (1.1). UOP of 1L last 24hrs. Genitalia erythematous. Daily dressing changes.    HEME: HgB of 10.5. plts 106 this am.    ID: WBC normalized. Pancultures from 8/8 pending. NSTI. Cefipime 14da (until 8/16). Sputum w/ 4+ yeast. BC from 8/8 w/ NG48.    ENDO: Insulin ggt. -180 last 24hrs.     DVT PPX: SCDs & sq heparin TID    GI PPX:  PPI      DISPOSITION:  SICU.     Please page [phone] with any questions or concerns.  Connor Evans MD  8/15/2022  7:50 AM

## 2022-08-15 NOTE — PROGRESS NOTES
Endocrinology Progress Note       SUBJECTIVE   Interval History:    Patient is  alert and awake. Able to communicate.  Eating yogurt well this morning. On calorie counting now. BG stable.        OBJECTIVE      Vital signs in last 24 hours:  Temp:  [36.8 °C (98.2 °F)] 36.8 °C (98.2 °F)  Heart Rate:  [] 95  Resp:  [16-18] 18  BP: ()/(51-71) 111/57      PHYSICAL EXAMINATION        Constitutional: well-developed. well- nourished. Appears lethargic  Eyes: Conjunctivae and EOM are normal. PERRL  Neck: Normal range of motion. Neck supple.    Neurological: Alert and awake      LABS / IMAGING / TELE      Labs    Lab Results   Component Value Date    GLUCOSE 256 (H) 08/15/2022    CALCIUM 7.9 (L) 08/15/2022     (H) 08/15/2022    K 3.5 (L) 08/15/2022    CO2 26 08/15/2022     (H) 08/15/2022    BUN 41 (H) 08/15/2022    CREATININE 0.9 08/15/2022     Lab Results   Component Value Date    HGBA1C 6.3 (H) 07/23/2022     Lab Results   Component Value Date    CHOL 94 07/23/2022     Lab Results   Component Value Date    HDL 23 (L) 07/23/2022     Lab Results   Component Value Date    LDLCALC 63 07/23/2022     Lab Results   Component Value Date    TRIG 41 07/23/2022     No results found for: CHOLHDL  Lab Results   Component Value Date    ALT 28 08/15/2022    AST 76 (H) 08/15/2022    ALKPHOS 94 08/15/2022    BILITOT 0.6 08/15/2022      Latest Reference Range & Units Most Recent   TSH 0.34 - 5.60 mIU/L 9.96 (H)  08/14/22 05:44   Free T4 0.58 - 1.64 ng/dL 0.25 (L)  08/14/22 05:44   (H): Data is abnormally high  (L): Data is abnormally low      ASSESSMENT AND PLAN      1 History of preDM  - evidenced with A1c 6.3. on Jardiance 10 mg PTA  - required insulin with TF.     - now off TF. On puree diet.   - will monitor BG. Keep novolog ss if needed.   - pls notify service about nutrition plan.      2 necrotizing fasciitis  This occurred in the context of SGLT2 inhibitor use for heart failure  Jardiance has been  discontinued.  He should not receive any further treatment with medications in this class going forward.    3 Hypothyroidism  - Was on LT4 100 mcg daily PTA.    - fabrizio TSH due to missing LT4.   - ordered LT4 70 mcg IV dailyx 2 days, then 100 mcg daily orally.   - repeat TFT in 1 wk.     4, History of CHF - mgt per cardiology service    Thank you very much for allowing me participating in this patient's care. Please feel free to contact me if any questions.          Madonna Dent MD

## 2022-08-16 ENCOUNTER — APPOINTMENT (INPATIENT)
Dept: RADIOLOGY | Facility: HOSPITAL | Age: 87
DRG: 717 | End: 2022-08-16
Payer: MEDICARE

## 2022-08-16 LAB
ALBUMIN SERPL-MCNC: 1.5 G/DL (ref 3.4–5)
ALP SERPL-CCNC: 104 IU/L (ref 35–126)
ALT SERPL-CCNC: 33 IU/L (ref 16–63)
ANION GAP SERPL CALC-SCNC: 4 MEQ/L (ref 3–15)
ANION GAP SERPL CALC-SCNC: 6 MEQ/L (ref 3–15)
AST SERPL-CCNC: 84 IU/L (ref 15–41)
BILIRUB SERPL-MCNC: 0.8 MG/DL (ref 0.3–1.2)
BUN SERPL-MCNC: 29 MG/DL (ref 8–20)
BUN SERPL-MCNC: 32 MG/DL (ref 8–20)
CA-I BLD-SCNC: 1.19 MMOL/L (ref 1.15–1.27)
CALCIUM SERPL-MCNC: 7.9 MG/DL (ref 8.9–10.3)
CALCIUM SERPL-MCNC: 8.2 MG/DL (ref 8.9–10.3)
CHLORIDE SERPL-SCNC: 114 MEQ/L (ref 98–109)
CHLORIDE SERPL-SCNC: 117 MEQ/L (ref 98–109)
CO2 SERPL-SCNC: 24 MEQ/L (ref 22–32)
CO2 SERPL-SCNC: 25 MEQ/L (ref 22–32)
CREAT SERPL-MCNC: 0.8 MG/DL (ref 0.8–1.3)
CREAT SERPL-MCNC: 0.8 MG/DL (ref 0.8–1.3)
ERYTHROCYTE [DISTWIDTH] IN BLOOD BY AUTOMATED COUNT: 16.2 % (ref 11.6–14.4)
GFR SERPL CREATININE-BSD FRML MDRD: >60 ML/MIN/1.73M*2
GFR SERPL CREATININE-BSD FRML MDRD: >60 ML/MIN/1.73M*2
GLUCOSE BLD-MCNC: 148 MG/DL (ref 70–99)
GLUCOSE BLD-MCNC: 194 MG/DL (ref 70–99)
GLUCOSE BLD-MCNC: 201 MG/DL (ref 70–99)
GLUCOSE BLD-MCNC: 203 MG/DL (ref 70–99)
GLUCOSE SERPL-MCNC: 170 MG/DL (ref 70–99)
GLUCOSE SERPL-MCNC: 195 MG/DL (ref 70–99)
HCT VFR BLDCO AUTO: 32.2 % (ref 40.1–51)
HGB BLD-MCNC: 10.5 G/DL (ref 13.7–17.5)
INR PPP: 1.4
MAGNESIUM SERPL-MCNC: 1.9 MG/DL (ref 1.8–2.5)
MCH RBC QN AUTO: 35.2 PG (ref 28–33.2)
MCHC RBC AUTO-ENTMCNC: 32.6 G/DL (ref 32.2–36.5)
MCV RBC AUTO: 108.1 FL (ref 83–98)
PDW BLD AUTO: 11.3 FL (ref 9.4–12.4)
PHOSPHATE SERPL-MCNC: 2 MG/DL (ref 2.4–4.7)
PLATELET # BLD AUTO: 99 K/UL (ref 150–350)
POCT TEST: ABNORMAL
POTASSIUM SERPL-SCNC: 3.6 MEQ/L (ref 3.6–5.1)
POTASSIUM SERPL-SCNC: 3.8 MEQ/L (ref 3.6–5.1)
PROT SERPL-MCNC: 5.3 G/DL (ref 6–8.2)
PROTHROMBIN TIME: 16.6 SEC (ref 12.2–14.5)
QRS DURATION: 150
QT INTERVAL: 418
QTC CALCULATION(BAZETT): 536
R AXIS: 63
RBC # BLD AUTO: 2.98 M/UL (ref 4.5–5.8)
SODIUM SERPL-SCNC: 145 MEQ/L (ref 136–144)
SODIUM SERPL-SCNC: 145 MEQ/L (ref 136–144)
T WAVE AXIS: 270
VENTRICULAR RATE: 99
WBC # BLD AUTO: 8.41 K/UL (ref 3.8–10.5)

## 2022-08-16 PROCEDURE — 20000000 HC ROOM AND CARE ICU

## 2022-08-16 PROCEDURE — 63600000 HC DRUGS/DETAIL CODE: Performed by: SURGERY

## 2022-08-16 PROCEDURE — 36415 COLL VENOUS BLD VENIPUNCTURE: CPT | Performed by: STUDENT IN AN ORGANIZED HEALTH CARE EDUCATION/TRAINING PROGRAM

## 2022-08-16 PROCEDURE — 25800000 HC PHARMACY IV SOLUTIONS: Performed by: STUDENT IN AN ORGANIZED HEALTH CARE EDUCATION/TRAINING PROGRAM

## 2022-08-16 PROCEDURE — 84100 ASSAY OF PHOSPHORUS: CPT | Performed by: STUDENT IN AN ORGANIZED HEALTH CARE EDUCATION/TRAINING PROGRAM

## 2022-08-16 PROCEDURE — 93010 ELECTROCARDIOGRAM REPORT: CPT | Performed by: INTERNAL MEDICINE

## 2022-08-16 PROCEDURE — 25000000 HC PHARMACY GENERAL

## 2022-08-16 PROCEDURE — 97167 OT EVAL HIGH COMPLEX 60 MIN: CPT | Mod: GO

## 2022-08-16 PROCEDURE — 74230 X-RAY XM SWLNG FUNCJ C+: CPT

## 2022-08-16 PROCEDURE — 80048 BASIC METABOLIC PNL TOTAL CA: CPT | Performed by: STUDENT IN AN ORGANIZED HEALTH CARE EDUCATION/TRAINING PROGRAM

## 2022-08-16 PROCEDURE — 63600000 HC DRUGS/DETAIL CODE: Performed by: STUDENT IN AN ORGANIZED HEALTH CARE EDUCATION/TRAINING PROGRAM

## 2022-08-16 PROCEDURE — 63600000 HC DRUGS/DETAIL CODE

## 2022-08-16 PROCEDURE — 92526 ORAL FUNCTION THERAPY: CPT | Mod: GN

## 2022-08-16 PROCEDURE — 97530 THERAPEUTIC ACTIVITIES: CPT | Mod: GO

## 2022-08-16 PROCEDURE — 80053 COMPREHEN METABOLIC PANEL: CPT | Performed by: STUDENT IN AN ORGANIZED HEALTH CARE EDUCATION/TRAINING PROGRAM

## 2022-08-16 PROCEDURE — 97162 PT EVAL MOD COMPLEX 30 MIN: CPT | Mod: GP

## 2022-08-16 PROCEDURE — 92611 MOTION FLUOROSCOPY/SWALLOW: CPT | Mod: GN

## 2022-08-16 PROCEDURE — 85610 PROTHROMBIN TIME: CPT | Performed by: STUDENT IN AN ORGANIZED HEALTH CARE EDUCATION/TRAINING PROGRAM

## 2022-08-16 PROCEDURE — 25000000 HC PHARMACY GENERAL: Performed by: INTERNAL MEDICINE

## 2022-08-16 PROCEDURE — 83735 ASSAY OF MAGNESIUM: CPT | Performed by: STUDENT IN AN ORGANIZED HEALTH CARE EDUCATION/TRAINING PROGRAM

## 2022-08-16 PROCEDURE — 99232 SBSQ HOSP IP/OBS MODERATE 35: CPT | Performed by: INTERNAL MEDICINE

## 2022-08-16 PROCEDURE — 99024 POSTOP FOLLOW-UP VISIT: CPT | Performed by: COLON & RECTAL SURGERY

## 2022-08-16 PROCEDURE — 63700000 HC SELF-ADMINISTRABLE DRUG: Performed by: SURGERY

## 2022-08-16 PROCEDURE — 82330 ASSAY OF CALCIUM: CPT | Performed by: SURGERY

## 2022-08-16 PROCEDURE — 85027 COMPLETE CBC AUTOMATED: CPT | Performed by: STUDENT IN AN ORGANIZED HEALTH CARE EDUCATION/TRAINING PROGRAM

## 2022-08-16 PROCEDURE — 25800000 HC PHARMACY IV SOLUTIONS

## 2022-08-16 RX ORDER — IBUPROFEN 200 MG
16-32 TABLET ORAL AS NEEDED
Status: DISCONTINUED | OUTPATIENT
Start: 2022-08-16 | End: 2022-08-23 | Stop reason: HOSPADM

## 2022-08-16 RX ORDER — IBUPROFEN 200 MG
16-32 TABLET ORAL AS NEEDED
Status: DISCONTINUED | OUTPATIENT
Start: 2022-08-16 | End: 2022-08-16 | Stop reason: SDUPTHER

## 2022-08-16 RX ORDER — POTASSIUM CHLORIDE 14.9 MG/ML
20 INJECTION INTRAVENOUS ONCE
Status: COMPLETED | OUTPATIENT
Start: 2022-08-16 | End: 2022-08-16

## 2022-08-16 RX ORDER — FUROSEMIDE 10 MG/ML
40 INJECTION INTRAMUSCULAR; INTRAVENOUS ONCE
Status: COMPLETED | OUTPATIENT
Start: 2022-08-17 | End: 2022-08-16

## 2022-08-16 RX ORDER — INSULIN ASPART 100 [IU]/ML
3-5 INJECTION, SOLUTION INTRAVENOUS; SUBCUTANEOUS EVERY 6 HOURS
Status: DISCONTINUED | OUTPATIENT
Start: 2022-08-16 | End: 2022-08-17

## 2022-08-16 RX ORDER — DEXTROSE 50 % IN WATER (D50W) INTRAVENOUS SYRINGE
25 AS NEEDED
Status: DISCONTINUED | OUTPATIENT
Start: 2022-08-16 | End: 2022-08-16 | Stop reason: SDUPTHER

## 2022-08-16 RX ORDER — FUROSEMIDE 10 MG/ML
40 INJECTION INTRAMUSCULAR; INTRAVENOUS ONCE
Status: COMPLETED | OUTPATIENT
Start: 2022-08-16 | End: 2022-08-16

## 2022-08-16 RX ORDER — DEXTROSE 40 %
15-30 GEL (GRAM) ORAL AS NEEDED
Status: DISCONTINUED | OUTPATIENT
Start: 2022-08-16 | End: 2022-08-23 | Stop reason: HOSPADM

## 2022-08-16 RX ORDER — DEXTROSE 50 % IN WATER (D50W) INTRAVENOUS SYRINGE
25 AS NEEDED
Status: DISCONTINUED | OUTPATIENT
Start: 2022-08-16 | End: 2022-08-23 | Stop reason: HOSPADM

## 2022-08-16 RX ORDER — DEXTROSE 40 %
15-30 GEL (GRAM) ORAL AS NEEDED
Status: DISCONTINUED | OUTPATIENT
Start: 2022-08-16 | End: 2022-08-16 | Stop reason: SDUPTHER

## 2022-08-16 RX ORDER — INSULIN ASPART 100 [IU]/ML
3-5 INJECTION, SOLUTION INTRAVENOUS; SUBCUTANEOUS
Status: DISCONTINUED | OUTPATIENT
Start: 2022-08-16 | End: 2022-08-16

## 2022-08-16 RX ORDER — POTASSIUM CHLORIDE 14.9 MG/ML
20 INJECTION INTRAVENOUS ONCE
Status: DISCONTINUED | OUTPATIENT
Start: 2022-08-16 | End: 2022-08-16

## 2022-08-16 RX ADMIN — HEPARIN SODIUM 5000 UNITS: 5000 INJECTION, SOLUTION INTRAVENOUS; SUBCUTANEOUS at 15:19

## 2022-08-16 RX ADMIN — FUROSEMIDE 40 MG: 10 INJECTION, SOLUTION INTRAMUSCULAR; INTRAVENOUS at 05:56

## 2022-08-16 RX ADMIN — CEFEPIME 2 G: 2 INJECTION, POWDER, FOR SOLUTION INTRAVENOUS at 11:28

## 2022-08-16 RX ADMIN — HEPARIN SODIUM 5000 UNITS: 5000 INJECTION, SOLUTION INTRAVENOUS; SUBCUTANEOUS at 21:08

## 2022-08-16 RX ADMIN — LEVOTHYROXINE SODIUM 70 MCG: 20 INJECTION, SOLUTION INTRAVENOUS at 18:13

## 2022-08-16 RX ADMIN — COLLAGENASE SANTYL 1 APPLICATION.: 250 OINTMENT TOPICAL at 08:05

## 2022-08-16 RX ADMIN — MAGNESIUM SULFATE IN DEXTROSE 1 G: 10 INJECTION, SOLUTION INTRAVENOUS at 11:27

## 2022-08-16 RX ADMIN — FUROSEMIDE 40 MG: 10 INJECTION, SOLUTION INTRAMUSCULAR; INTRAVENOUS at 23:28

## 2022-08-16 RX ADMIN — POTASSIUM CHLORIDE 20 MEQ: 200 INJECTION, SOLUTION INTRAVENOUS at 15:19

## 2022-08-16 RX ADMIN — INSULIN HUMAN 3 UNITS: 100 INJECTION, SOLUTION PARENTERAL at 12:12

## 2022-08-16 RX ADMIN — HEPARIN SODIUM 5000 UNITS: 5000 INJECTION, SOLUTION INTRAVENOUS; SUBCUTANEOUS at 05:56

## 2022-08-16 RX ADMIN — INSULIN ASPART 3 UNITS: 100 INJECTION, SOLUTION INTRAVENOUS; SUBCUTANEOUS at 23:28

## 2022-08-16 RX ADMIN — DAKIN'S SOLUTION 0.125% (QUARTER STRENGTH): 0.12 SOLUTION at 08:05

## 2022-08-16 RX ADMIN — LANSOPRAZOLE 30 MG: KIT at 08:10

## 2022-08-16 RX ADMIN — POTASSIUM PHOSPHATE, MONOBASIC POTASSIUM PHOSPHATE, DIBASIC 20 MMOL: 224; 236 INJECTION, SOLUTION, CONCENTRATE INTRAVENOUS at 11:28

## 2022-08-16 ASSESSMENT — COGNITIVE AND FUNCTIONAL STATUS - GENERAL
AFFECT: WFL;FLAT/BLUNTED AFFECT
WALKING IN HOSPITAL ROOM: 1 - TOTAL
CLIMB 3 TO 5 STEPS WITH RAILING: 1 - TOTAL
AFFECT: WFL
FOLLOWS FAMILIAR CONVERSATION: 4 - NONE
TAKING CARE OF COMPLICATED TASKS: 2 - A LOT
DRESSING REGULAR UPPER BODY CLOTHING: 2 - A LOT
REMEMBERING TO TAKE MEDICATION: 2 - A LOT
MOVING TO AND FROM BED TO CHAIR: 2 - A LOT
TOILETING: 1 - TOTAL
UNDERSTANDING 10 TO 15 MIN SPEECH: 3 - A LITTLE
REMEMBERING 5 ERRANDS WITH NO LIST: 2 - A LOT
EATING MEALS: 1 - TOTAL
DRESSING REGULAR LOWER BODY CLOTHING: 1 - TOTAL
HELP NEEDED FOR PERSONAL GROOMING: 2 - A LOT
STANDING UP FROM CHAIR USING ARMS: 2 - A LOT
AFFECT: WFL
HELP NEEDED FOR BATHING: 2 - A LOT
REMEMBERING WHERE THINGS ARE: 3 - A LITTLE

## 2022-08-16 NOTE — PROGRESS NOTES
Endocrinology Progress Note       SUBJECTIVE   Interval History:    Patient is  alert and awake. Able to communicate.  Eating breakfast with assistance this morning.  Stable condition.  Tube feeding was stopped       OBJECTIVE      Vital signs in last 24 hours:  Heart Rate:  [] 100  Resp:  [16-18] 16  BP: ()/(55-71) 111/58      PHYSICAL EXAMINATION        Constitutional: well-developed. well- nourished. Appears lethargic  Eyes: Conjunctivae and EOM are normal. PERRL  Neck: Normal range of motion. Neck supple.    Neurological: Alert and awake      LABS / IMAGING / TELE      Labs    Lab Results   Component Value Date    GLUCOSE 195 (H) 08/16/2022    CALCIUM 7.9 (L) 08/16/2022     (H) 08/16/2022    K 3.6 08/16/2022    CO2 24 08/16/2022     (H) 08/16/2022    BUN 32 (H) 08/16/2022    CREATININE 0.8 08/16/2022     Lab Results   Component Value Date    HGBA1C 6.3 (H) 07/23/2022     Lab Results   Component Value Date    CHOL 94 07/23/2022     Lab Results   Component Value Date    HDL 23 (L) 07/23/2022     Lab Results   Component Value Date    LDLCALC 63 07/23/2022     Lab Results   Component Value Date    TRIG 41 07/23/2022     No results found for: CHOLHDL  Lab Results   Component Value Date    ALT 33 08/16/2022    AST 84 (H) 08/16/2022    ALKPHOS 104 08/16/2022    BILITOT 0.8 08/16/2022      Latest Reference Range & Units Most Recent   TSH 0.34 - 5.60 mIU/L 9.96 (H)  08/14/22 05:44   Free T4 0.58 - 1.64 ng/dL 0.25 (L)  08/14/22 05:44   (H): Data is abnormally high  (L): Data is abnormally low      ASSESSMENT AND PLAN      1 History of preDM  - evidenced with A1c 6.3. on Jardiance 10 mg PTA  - required insulin with TF.     - now off TF. On puree diet.   - will monitor BG. Keep novolog ss if needed.   - pls notify service about nutrition plan.      2 necrotizing fasciitis  This occurred in the context of SGLT2 inhibitor use for heart failure  Jardiance has been discontinued.  He should not receive  any further treatment with medications in this class going forward.    3 Hypothyroidism  - Was on LT4 100 mcg daily PTA.    - fabrizio TSH due to missing LT4.   - ordered LT4 70 mcg IV dailyx 2 days, then 100 mcg daily orally.   - repeat TFT in 1 wk.     4, History of CHF - mgt per cardiology service    Thank you very much for allowing me participating in this patient's care. Please feel free to contact me if any questions.          Madonna Dent MD

## 2022-08-16 NOTE — PROCEDURES
Video swallow study was completed. Please refer to the imaging section for full report and recommendations.        Results for orders placed during the hospital encounter of 08/02/22    FLUOROSCOPY VIDEO SWALLOW WITH SPEECH (Preliminary)  This result has not been signed. Information might be incomplete.    Impression  1. Moderate to severe oral stage dysphagia.  2. Severe pharyngeal stage dysphagia characterized by swallow delay, reduced  tongue-base retraction, severely reduced to absent epiglottic inversion, chronic  silent aspiration with all consistencies. A cued cough was ineffective at  clearing aspirated materials.  3. The esophagus was not assessed during today's study.      Betsy Benitez PA-C was present for this examination.  Dr. Alice Dumont agrees only with the radiographic findings.  Specific  swallowing recommendations are solely the purview of the Speech Pathology  Division.  This study was conducted and written by Candace Feldman MS, CCC-SLP/L.

## 2022-08-16 NOTE — PATIENT CARE CONFERENCE
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

## 2022-08-16 NOTE — PROGRESS NOTES
General Surgery Daily Progress Note    Subjective     Interval History: Transfer out of ICU but awaiting a floor bed. Yesterday diuresed with 40 Lasix in the AM - not redosed in PM. Tolerating tube feeds. Cleared for puree and thick liquids and eating around the DHT. Ostomy is productive for gas and stool, no abdominal pain, no back pain, no nausea or vomiting, no SOB or chest pain.       Objective     Vital signs in last 24 hours:  Heart Rate:  [] 94  BP: (103-138)/(51-71) 124/68      Intake/Output Summary (Last 24 hours) at 8/16/2022 0646  Last data filed at 8/16/2022 0600  Gross per 24 hour   Intake 4870 ml   Output 3285 ml   Net 1585 ml     Intake/Output this shift:  I/O this shift:  In: 2300 [NG/GT:2100; IV Piggyback:200]  Out: 1325 [Urine:1150; Stool:175]    Physical Exam    General appearance: Arousable, A&O x3, non-toxic appearing but frail appearing, DHT secure at nares  Neck: no JVD  Lungs: No increased work of breathing, on NC 3L.   Heart: No pressors, slightly tachycardic, afib  Abdomen: stoma pink and protruding, productive for gas and liquid brown stool. Abdomen soft, distended, non-tender and not tympanitic.   Extremities: extremities normal, warm and well-perfused; no cyanosis, clubbing, or edema  Pulses: 2+ and symmetric  Skin: Skin color, texture, turgor normal. No rashes or lesions  Neurologic: Grossly normal    VTE Assessment: I have reassessed and the patient's VTE risk and treatment plan is appropriate.    Labs    CBC:   Lab Results   Component Value Date    WBC 8.41 08/16/2022    RBC 2.98 (L) 08/16/2022     BMP:   Lab Results   Component Value Date    GLUCOSE 247 (H) 08/15/2022    CO2 24 08/15/2022    BUN 35 (H) 08/15/2022    CREATININE 0.9 08/15/2022    CALCIUM 8.0 (L) 08/15/2022     Coagulation:   Lab Results   Component Value Date    PT 16.6 (H) 08/16/2022    INR 1.4 08/16/2022       Imaging  I have reviewed the Imaging from the last 24 hrs.      Assessment/Plan      89 y/o male with  PMH of biventricular heart failure, HTN, Afib, CAD who is now s/p EUA wound debridement of gluteal NSTI on 8/2 and diverting loop sigmoid colostomy on 8/3.    Extubated on 8/13, cleared for thick liquids and puree.     -Awaiting step down bed  -Continue TF @ goal while continuing with puree diet. --> will pause Tube Feeds today and obtain calorie counts to see how much calories can take PO  -Appreciate Endocrine recs when transitioning diet   -Encourage ISS and pulmonary toilet. Wean O2 as tolerates.   -On Cefipime for 14 day course (ends 8/16)  -Off Insulin gtt -> will follow endo recs as transitions diet  -PT/OT   -WOCN for new ostomy teaching once on floor.   -Continue BID packing changes to wound with santyl and dakins. Discussion of vac placement Wednesday.     Contact [phone] for questions or concerns  Samy Haddad, DO

## 2022-08-16 NOTE — PLAN OF CARE
Problem: Adult Inpatient Plan of Care  Goal: Plan of Care Review  Flowsheets (Taken 8/16/2022 1230)  Outcome Summary: OT eval complete. Rec d/c to SNF     Problem: Self-Care Deficit  Goal: Improved Ability to Complete Activities of Daily Living  Intervention: Promote Activity and Functional [place]  Flowsheets (Taken 8/16/2022 1230)  Activity Assistance Provided: assistance, 2 people  Self-Care Promotion:   independence encouraged   BADL personal objects within reach   BADL personal routines maintained

## 2022-08-16 NOTE — PROGRESS NOTES
Patient:  Samy Elena  Location:  [facility] SICU SICU12  MRN:  [id number]  Today's date:  8/16/2022    Patient seen for OT session. RN agreeable.    Patient received in bed. Patient left in chair with waffle cushion, alarm activated and supine sling in place at end of session, all needs within reach.     RN updated regarding session.       Samy is a 89 y.o. 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    Past Medical History  Samy has a past medical history of Atrial fibrillation (CMS/[facility]), Disease of thyroid gland, GI (gastrointestinal bleed), Hypertension, and Myocardial infarction (CMS/[facility]).    History of Present Illness   Admit from OP MD's office 8/2 for denise's gangrene. s/p EUA wound debridement of gluteal NSTI on 8/2 and diverting loop sigmoid colostomy on 8/3  He was then admitted to the surgical ICU where he was intubated and had transient pressor requirements. ETT 8/3-8/12, extubated 8/13. Cleared for thick liquids and puree.      OT Vitals    Date/Time Pulse SpO2 Pt Activity O2 Therapy O2 Del Method O2 Flow Rate BP MAP BP Location BP Method Pt Position Observations [facility]   08/16/22 0949 99 98 % At rest Supplemental oxygen Nasal cannula 3 L/min 109/59 78 mmHg Left upper arm Automatic Lying --    08/16/22 1000 -- -- -- -- -- -- 118/71 -- -- Automatic Sitting at EOB    08/16/22 1008 106 96 % At rest Supplemental oxygen Nasal cannula 3 L/min 109/67 -- -- Automatic Sitting in chair, post session DW      OT Pain    Date/Time Rating: Rest [facility]   08/16/22 0949 0 - no pain DW          Prior Living Environment    Flowsheet Row Most Recent Value   Living Environment Comment Lives alone in 2 SH with FL on first floor and bed/bath upstairs. Tub shower. Sons installed stair glide last week.        Prior Level of Function    Flowsheet Row Most Recent Value   Dominant Hand right   Eating independent   Communication  difficulty speaking (not related to language barrier)  [per RN, pt's triny reports dysarthria PTA]   Swallowing difficulty swallowing foods   Baseline Diet/Method of Nutritional Intake no diet restrictions   Past History of Dysphagia Pt with no hx of dysphagia prior to admission   Prior Level of Function Comment Mod(i) for BADLs and mobility using RW. Has  for meals and IADLs. Sons assist PRN.   Assistive Device Currently Used at Home cane, straight, walker, front-wheeled        Occupational Profile    Flowsheet Row Most Recent Value   Reason for Services/Referral ADL evaluation/dispo planning           OT Evaluation and Treatment - 08/16/22 0949        OT Time Calculation    Start Time 0949     Stop Time 1015     Time Calculation (min) 26 min        Session Details    Document Type initial evaluation     Mode of Treatment occupational therapy        General Information    Patient Profile Reviewed yes     Onset of Illness/Injury or Date of Surgery 08/16/22     Referring Physician Noone     Patient/Family/Caregiver Comments/Observations RN agreeable     General Observations of Patient Received in bed, NAD     Existing Precautions/Restrictions aspiration;fall;oxygen therapy device and L/min   3L O2 NC, DHT, DNR    Limitations/Impairments safety/cognitive;swallowing        Cognition/Psychosocial    Affect/Mental Status (Cognition) WFL     Orientation Status (Cognition) oriented x 3;disoriented to;time   disoriented to time of day despite environmental cues    Follows Commands (Cognition) follows one-step commands;WFL     Cognitive Function memory deficit;executive function deficit     Executive Function Deficit (Cognition) minimal deficit;information processing;planning/decision-making     Memory Deficit (Cognition) minimal deficit;working memory     Comment, Cognition Pleasant and highly motivated to participate in interventions. Hoarse/hypophonic voice. Requires loud voice/clear cues to compensate for  hearing impairments. Able to communicate wants/needs and direct care to others appropriately. Receptive to all education and recommendations provided.        Hearing Assessment    Hearing Status hearing aid, bilateral;hearing impairment, bilaterally        Vision Assessment/Intervention    Visual Impairment/Limitations corrective lenses full-time        Sensory Assessment (Somatosensory)    Sensory Assessment (Somatosensory) UE sensation intact        Range of Motion (ROM)    Left Upper Extremity (ROM) other (see comments)   limited by weakness; PROM and AAROM WFL    Right Upper Extremity (ROM) other (see comments)   limited by weakness; PROM and AAROM WFL       Strength (Manual Muscle Testing)    Shoulder, Left (Strength) 2+     Elbow, Left (Strength) 3-     Wrist, Left (Strength) 3     Hand, Left (Strength) 3     Shoulder, Right (Strength) 2     Elbow, Right (Strength) 3-     Wrist, Right (Strength) 3     Hand, Right (Strength) 3        Edema Symptoms/Interventions    Description (Edema) diffuse   BUEs/BLEs    Associated Symptoms (Edema) skin creases diminished     Treatment Interventions (Edema) active range of motion;elevation;positioning        Bed Mobility    [place], Supine to Sit moderate assist (50-74% patient effort);2 person assist     Assistive Device bed rails;head of bed elevated     Comment (Bed Mobility) OOB to L side        Bed to Chair Transfer    [place], Bed to Chair maximum assist (25-49% patient effort);2 person assist     Verbal Cues hand placement;safety;technique     Comment bed-->chair towards L side; able to take ~2 steps with maxA for weight shifting and sequencing. Required maxA x2 for repositioning in chair once sitting.        Sit to Stand Transfer    [place], Sit to Stand Transfer maximum assist (25-49% patient effort);2 person assist     Verbal Cues hand placement;safety;technique     Assistive Device none     Comment b/l axillary support from EOB x2 attempts         Stand to Sit Transfer    [place], Stand to Sit Transfer maximum assist (25-49% patient effort);2 person assist     Verbal Cues hand placement;safety;technique     Assistive Device none     Comment to recliner chair, with assistance to control descent        Safety Issues, Functional Mobility    Impairments Affecting Function (Mobility) balance;endurance/activity tolerance;range of motion (ROM);strength        Balance    Static Sitting Balance mild impairment     Dynamic Sitting Balance moderate impairment     Static Standing Balance severe impairment     Dynamic Standing Balance severe impairment     Comment, Balance Robbi to sit EOB, maxA x2 to  place and to pivot from bed-->chair to L side        Therapeutic Exercise    Therapeutic Exercise upper extremity        Upper Extremity (Therapeutic Exercise)    Exercise Position/Type supine;AAROM (active assistive range of motion)     Range of Motion Exercises bilateral;shoulder flexion/extension;elbow flexion/extension     Reps and Sets x5 each     Comment with proximal support to compensate for weakness        Lower Body Dressing    Tasks don;socks     [place] dependent (less than 25% patient effort)     Position supine        Grooming    Self-Performance washes, rinses and dries face;brushes/carvalho hair     [place] maximum assist (25-49% patient effort)     Position supported sitting     Setup Assistance obtain supplies     Adaptive Equipment none     Comment able to grasp ADL items in hand, required support to bring hand to face/hair        Toileting    Comment dependent via IUC and ostomy        BADL Safety/Performance    Skilled BADL Treatment/Intervention adaptive equipment training;BADL process/adaptation training;compensatory training;energy conservation;cognitive/safety deficit modifications        ADL Interventions    Energy Conservation Techniques activity adapted to sitting;breathing techniques encouraged;correct body mechanics  utilized;correct posture facilitated;regular rest breaks encouraged     Self-Care (BADL) Promotion best position for BADL determined;close supervision for balance provided;close supervision for safety provided;out of bed for BADLs encouraged;regular rest breaks encouraged        AM-PAC (TM) - ADL (Current Function)    Putting on and taking off regular lower body clothing? 1 - Total     Bathing? 2 - A Lot     Toileting? 1 - Total     Putting on/taking off regular upper body clothing? 2 - A Lot     How much help for taking care of personal grooming? 2 - A Lot     Eating meals? 1 - Total     AM-PAC (TM) ADL Score 9        Assessment/Plan (OT)    Daily Outcome Statement OT eval complete. Patient required modA x2 for bed mobility, Robbi to sit EOB and maxA x2 to transfer from bed-->chair. Patient required maxA to totalA for simple BADLs. Patient limited by edema, proximal weakness and generalized deconditioning. OT will follow. Rec d/c to SNF.     Rehab Potential good, to achieve stated therapy goals     Therapy Frequency 5 times/wk     Planned Therapy Interventions activity tolerance training;adaptive equipment training;BADL retraining;functional balance retraining;cognitive/visual perception retraining;edema control/reduction;occupation/activity based interventions;patient/caregiver education/training;ROM/therapeutic exercise;strengthening exercise;transfer/mobility retraining               OT Assessment/Plan    Flowsheet Row Most Recent Value   OT Recommended Discharge Disposition skilled nursing facility at 08/16/2022 0949   Anticipated Equipment Needs At Discharge (OT) other (see comments)  [TBD] at 08/16/2022 0949   Patient/Family Therapy Goal Statement to get stronger at 08/16/2022 0949                    Education Documentation  Self-Care, taught by Ivy Connelly OT at 8/16/2022 12:31 PM.  Learner: Patient  Readiness: Acceptance  Method: Explanation  Response: Needs Reinforcement  Comment: Role of OT, OT  POC, ADL recommendations, simple UE therex, BUE positioning for edema management, d/c planning          OT Goals    Flowsheet Row Most Recent Value   Bed Mobility Goal 1    Activity/Assistive Device bed mobility activities, all at 08/16/2022 0949   [place] minimum assist (75% or more patient effort) at 08/16/2022 0949   Time Frame by discharge at 08/16/2022 0949   Transfer Goal 1    Activity/Assistive Device all transfers at 08/16/2022 0949   [place] minimum assist (75% or more patient effort) at 08/16/2022 0949   Time Frame by discharge at 08/16/2022 0949   Dressing Goal 1    Activity/Adaptive Equipment dressing skills, all at 08/16/2022 0949   [place] minimum assist (75% or more patient effort) at 08/16/2022 0949   Time Frame by discharge at 08/16/2022 0949   Strategies/Barriers using AD PRN at 08/16/2022 0949   Toileting Goal 1    Activity/Assistive Device toileting skills, all at 08/16/2022 0949   [place] minimum assist (75% or more patient effort) at 08/16/2022 0949   Time Frame by discharge at 08/16/2022 0949   Strategies/Barriers via commode at 08/16/2022 0949   Grooming Goal 1    Activity/Assistive Device grooming skills, all at 08/16/2022 0949   [place] minimum assist (75% or more patient effort) at 08/16/2022 0949   Time Frame by discharge at 08/16/2022 0949

## 2022-08-16 NOTE — PLAN OF CARE
Problem: Adult Inpatient Plan of Care  Goal: Plan of Care Review  Outcome: Progressing  Flowsheets (Taken 8/16/2022 0263)  Progress: improving  Plan of Care Reviewed With: patient  Outcome Summary: PT eval complete, pt req mod/max assist for mobility, limited by balance, endurance and strength deficits, anticipate SNF at DC

## 2022-08-16 NOTE — PROGRESS NOTES
Patient:  Samy Elena  Location:  [facility] SICU SICU12  MRN:  [id number]  Today's date:  8/16/2022  Speech Pathology: Therapy session    SLP Diagnosis: Moderate oral stage and suspected pharyngeal stage dysphagia in setting of prolonged intubation, dysarthria (baseline)    Recommendations:  1.  Continue level 4 puréed solids plus moisture adaptation (extra sauces, gravies, dips)/level 2 mildly thick liquids, medication per patient preference  2.  Aspiration precautions including one-to-one supervision, patient to self-feed if able to maintain safety, small single bites and sips, slow rate of ingestion, defer p.o. intake if patient is not awake and alert  3. GERD precautions including upright posture during/after meals   4. Cyclic ingestion (alternate liquids and solids 1:1)  5.  Patient remains at risk for silent aspiration in setting of prolonged intubation, inconsistent concerns with aspiration at the bedside.  Would recommend video swallow to objectively assess swallow function and determine least restrictive diet level  6.  Speech therapy for ongoing dysphagia follow-up, further Wil to come following completion of video swallow      Summary/Handoff:  Daily Outcome Statement: Dysphagia follow-up completed at the bedside.  Patient oriented x2, pleasant and cooperative.  Patient with reports of coughing with p.o. inconsistently-none observed during the session.  Patient assessed with thin liquids, showed no overt signs or symptoms of aspiration.  Patient remains at risk for silent aspiration in setting of prolonged intubation.  Recommend video swallow to objectively assess the patient swallow function and determine least restrictive diet level in setting of prolonged intubation, inconsistent signs and symptoms of aspiration.  Further recommendations to come following completion of study      Session Notes:     GRBAS: GRBAS Scoring unchanged from previous session.         Dietary Orders   (From  "admission, onward)             Start     Ordered    08/15/22 1134  Dietary nutrition supplements  Once        Question Answer Comment   Select Supplement (LMC): Magic Cup Vanilla    Meal period Breakfast Lunch Dinner        08/15/22 1133    08/15/22 1049  Calorie count  Once         08/15/22 1048    08/14/22 1242  Tube Feed with Food Peptamen AF; Continuous; 35; 65; please advance 10 q 2 hours; Water; 110; Every 1 hour; Pureed PU4; Mildly Thick MT2; RD/LDN may adjust order; AM Snack  (Tube Feed with Food Diet Orders with insertion and maintenance panels)  Diet effective now        References:    IDDSI Diet reference   Question Answer Comment   Tube Feeding Formula (LMC): Peptamen AF    Administration Type Continuous    Tube Feeding Start rate (mL/hr): 35    Tube Feeding Goal rate (mL/hr): 65    Nursing Instruction please advance 10 q 2 hours    Flush type: Water    Flush amount (mL): 110    Flush frequency: Every 1 hour    Diet Texture Pureed PU4    Fluid Consistency: Mildly Thick MT2    Delegation of Authority. Diet orders written by PA/CRRonald may not be adjusted by RD/LDNs. RD/LDN may adjust order    Meal period (AM Snack required for CBORD, do not remove: Not clinically relevant) AM Snack       \"And\" Linked Group Details    08/14/22 1242                Results from last 7 days   Lab Units 08/16/22  0552 08/15/22  0438 08/14/22  0544   WBC K/uL 8.41 8.94 9.62   HEMOGLOBIN g/dL 10.5* 10.5* 11.3*   HEMATOCRIT % 32.2* 32.7* 35.0*   PLATELETS K/uL 99* 106* 108*          RN cleared SLP to assess/work with patient as no medical issues identified to preclude this therapy session. Following completion of session, pt remained in bed as they were found with call bell in reach.  Nurse was made aware of patients performance and any changes in status (if applicable).      Samy is a 89 y.o. 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    Past " Medical History  Samy has a past medical history of Atrial fibrillation (CMS/HCC), Disease of thyroid gland, GI (gastrointestinal bleed), Hypertension, and Myocardial infarction (CMS/HCC).    History of Present Illness   Admit from OP MD's office 8/2 for denise's gangrene. s/p EUA wound debridement of gluteal NSTI on 8/2 and diverting loop sigmoid colostomy on 8/3  He was then admitted to the surgical ICU where he was intubated and had transient pressor requirements. ETT 8/3-8/12, extubated 8/13. Cleared for thick liquids and puree.      SLP Vitals    Date/Time Pulse Resp SpO2 O2 Therapy O2 Del Method O2 Flow Rate BP MAP Observations [facility]   08/16/22 0800 94 16 99 % Supplemental oxygen Nasal cannula 3 L/min 108/63 80 mmHg RN assessment; repositioned       SLP Pain    Date/Time Pain Type Rating: Rest Rating: Activity Comfort measures [facility]   08/16/22 0800 Pain Assessment 0 - no pain 0 - no pain lightweight clothing;lightweight bedding    08/16/22 0801 Pain Assessment 0 - no pain 0 - no pain -- KAYLENE          Prior Living Environment    Flowsheet Row Most Recent Value   Living Environment Comment Lives alone in 2 SH with TX on first floor and bed/bath upstairs. Tub shower. Sons installed stair glide last week.        Prior Level of Function    Flowsheet Row Most Recent Value   Dominant Hand right   Eating independent   Communication difficulty speaking (not related to language barrier)  [per RN, pt's famiyl reports dysarthria PTA]   Swallowing difficulty swallowing foods   Baseline Diet/Method of Nutritional Intake no diet restrictions   Past History of Dysphagia Pt with no hx of dysphagia prior to admission   Prior Level of Function Comment Mod(i) for BADLs and mobility using RW. Has  for meals and IADLs. Sons assist PRN.   Assistive Device Currently Used at Home cane, straight, walker, front-wheeled           SLP Evaluation and Treatment - 08/16/22 0801        SLP Time Calculation    Start Time 0800      "Stop Time 0812     Time Calculation (min) 12 min        Session Details    Document Type daily treatment/progress note     Mode of Treatment speech language pathology;individual therapy        General Information    Patient Profile Reviewed yes     Patient/Family/Caregiver Comments/Observations RN present for session     General Observations of Patient Pt received in bed, pleasant and cooperative     Existing Precautions/Restrictions aspiration;fall;modified diet;oxygen therapy device and L/min   3L/min, DHT+ (plannign to hold DHT feeds, initiate calorie count)    Limitations/Impairments safety/cognitive;swallowing        Cognition/Psychosocial    Affect/Mental Status (Cognition) WFL     Orientation Status (Cognition) oriented to;person;situation   \"hospital\", \"2022\"    Comment, Cognition Pt is pleasant and cooperative, SLP provided re-orientation.        Vocal Quality/Secretion Management (Oral Motor)    Vocal Quality (Oral Motor) WFL        Motor Speech    Comment, Motor Speech Intervention minimal dysarthria, however, pt is 100% intelligible to familiar listener this day (SLP)        Auditory Comprehension    Comment, Intervention (Auditory Comprehension) Pt is able to follow simple commands, answer simple questions effectively.        Verbal Expression    Comment, Intervention (Verbal Expression) The pt is able tgo state simple wants and needs, reported coughing with intake at times        Functional Communication Measures    FCM: Swallowing 4-->Level 4        General Swallowing Observations    Current Diet/Method of Nutritional Intake mildly thick liquids (MT2);pureed (PU4)     Signs/Symptoms of Aspiration (Current Diet) cough     Respiratory Support (General Swallowing Observations) nasal cannula;supplemental oxygen   3L    Comment, Secretions/Suctioning unremarkable     Comment, General Swallowing Observations Pt assessed this day at the bedside to determine if appropriate for VFSS. Of note, pt's DHT feeds " paused, medical team initiating calorie count        Food and Liquid Trials (NIS)    Patient Positioning HOB elevated (specify degrees)     Oral Intake/Feeding Performance oral trials administered by therapist     Liquid Consistencies Evaluated thin liquids     Thin Liquids straw sips;patient-controlled amounts     Comment, Thin Liquids no overt s/sx of aspiration, clear VQ, continued reduced consistent coordiantion of suck from straw-reduced labial seal     Food Consistencies Evaluated --   deferred ongoing trials, plan for VFSS    Oral Preparatory Phase of Swallow moderate impairment;mouth closure around utensil/cup decreased;lip seal impaired;loss of bolus/oral leakage, bilateral;suspect posterior bolus leak     Oral Phase of Swallow moderate impairment;delayed anterior-posterior transit;suspect posterior bolus leak     Pharyngeal Phase of Swallow no clinical symptoms     Comment, Pharyngeal Phase no overt s/sx of aspiration, cannot rule-out silent aspiration at the bedside     Esophageal Phase of Swallow no clinical symptoms     Comment Rec VFSS        Swallowing Recommendations    Diet Consistency Recommendations mildly thick liquids (MT2);pureed (PU4)     Medication Administration whole with liquid     Supervision Level for Intake 1:1 supervision needed     Feeding/Delivery Recommendations allow patient to feed self if maintaining safety;stop meal if showing signs of aspiration or fatigue (e.g., coughing, wet voice)     Posture Recommendations fully upright in chair/chair mode of bed     Swallowing Strategies alternate food and liquid intake     Instrumental Assessment Recommendations VFSS (videofluroscopic swallowing study)     Recommend VFSS (Comment) In setting of prolonged intubation, inconsistent reported s/sx of aspiration, rec VFSS to objectively assess the pt's swallow function and determine LRDL.     Comment, Swallowing Recommendations Contineu witha spiration and GERD precautions as trained         Swallowing Intervention    Dysphagia/Swallowing Interventions monitor tolerance of;current diet without evidence of aspiration;advanced diet/liquid texture trials        AM-PAC (TM) - Cognition (Current Function)    Following/understanding a 10-15 minute speech or presentation? 3 - A little     Understanding familiar people during ordinary conversations? 4 - None     Remembering to take medications at the appropriate time? 2 - A lot     Remembering where things were placed or put away? 3 - A little     Remembering a list of 3 or 4 errands without writing it down? 2 - A lot     Taking care of complicated tasks? 2 - A lot     AM-PAC (TM) Cognition Score 16        Assessment/Plan (SLP)    Daily Outcome Statement Dysphagia follow-up completed at the bedside.  Patient oriented x2, pleasant and cooperative.  Patient with reports of coughing with p.o. inconsistently-none observed during the session.  Patient assessed with thin liquids, showed no overt signs or symptoms of aspiration.  Patient remains at risk for silent aspiration in setting of prolonged intubation.  Recommend video swallow to objectively assess the patient swallow function and determine least restrictive diet level in setting of prolonged intubation, inconsistent signs and symptoms of aspiration.  Further recommendations to come following completion of study     Rehab Potential fair, will monitor progress closely     Therapy Frequency 3-5 times/wk     Planned Therapy Interventions dysphagia therapy               SLP Assessment/Plan    Flowsheet Row Most Recent Value   SLP Diagnosis Moderate oral stage and suspected pharyngeal stage dysphagia in setting of prolonged intubation, dysarthria (baseline) at 08/16/2022 0801   Patient/Family Therapy Goal Statement to drink water at 08/15/2022 0916                    SLP Goals    Flowsheet Row Most Recent Value   Oral Nutrition/Hydration Goal 1    Activity effective/safe/independent, oral nutrition/hydration, use  of swallowing techniques, management of texture/viscosity at 08/13/2022 1026   Time Frame long-term goal (LTG), by discharge at 08/13/2022 1026   Strategies/Barriers ETT x9 days, high oxygen demands at 08/13/2022 1026   Progress/Outcome goal ongoing at 08/16/2022 0801

## 2022-08-16 NOTE — PLAN OF CARE
Problem: Adult Inpatient Plan of Care  Goal: Plan of Care Review  Outcome: Unable to meet, plan of care revised  Flowsheets (Taken 8/16/2022 8587)  Progress: no change  Plan of Care Reviewed With:   patient   son  Outcome Summary: Pt off floor for video swallow. Did poorly, per speech therapist, NPO. Tube feeds resumed.   Plan of Care Review  Plan of Care Reviewed With: patient, son  Progress: no change  Outcome Summary: Pt off floor for video swallow. Did poorly, per speech therapist, NPO. Tube feeds resumed.

## 2022-08-16 NOTE — PROGRESS NOTES
Patient: Samy Elena  Location:  [facility] SICU SICU12  MRN:  [id number]  Today's date:  8/16/2022    Spoke with RN prior to session, RN cleared patient for therapy session. Post session, pt left in bedside chair, alarmed, with call bell and personal items within reach. RN informed of session completed.       Samy is a 89 y.o. 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    Past Medical History  Samy has a past medical history of Atrial fibrillation (CMS/HCC), Disease of thyroid gland, GI (gastrointestinal bleed), Hypertension, and Myocardial infarction (CMS/HCC).    History of Present Illness   Admit from OP MD's office 8/2 for denise's gangrene. s/p EUA wound debridement of gluteal NSTI on 8/2 and diverting loop sigmoid colostomy on 8/3  He was then admitted to the surgical ICU where he was intubated and had transient pressor requirements. ETT 8/3-8/12, extubated 8/13. Cleared for thick liquids and puree.      PT Vitals    Date/Time Pulse SpO2 Pt Activity O2 Therapy O2 Del Method O2 Flow Rate BP MAP BP Location BP Method Pt Position Observations [facility]   08/16/22 0949 99 98 % At rest Supplemental oxygen Nasal cannula 3 L/min 109/59 78 mmHg Left upper arm Automatic Lying --    08/16/22 1000 -- -- -- -- -- -- 118/71 -- -- Automatic Sitting at EOB    08/16/22 1008 106 96 % At rest Supplemental oxygen Nasal cannula 3 L/min 109/67 -- -- Automatic Sitting in chair, post session DW      PT Pain    Date/Time Rating: Rest [facility]   08/16/22 0949 0 - no pain DW          Prior Living Environment    Flowsheet Row Most Recent Value   Living Environment Comment Lives alone in 2 SH with IA on first floor and bed/bath upstairs. Tub shower. Sons installed stair glide last week.        Prior Level of Function    Flowsheet Row Most Recent Value   Dominant Hand right   Eating independent   Communication difficulty speaking (not  related to language barrier)  [per RN, pt's triny reports dysarthria PTA]   Swallowing difficulty swallowing foods   Baseline Diet/Method of Nutritional Intake no diet restrictions   Past History of Dysphagia Pt with no hx of dysphagia prior to admission   Prior Level of Function Comment Mod(i) for BADLs and mobility using RW. Has  for meals and IADLs. Sons assist PRN.   Assistive Device Currently Used at Home cane, straight, walker, front-wheeled           PT Evaluation and Treatment - 08/16/22 0948        PT Time Calculation    Start Time 0948     Stop Time 1015     Time Calculation (min) 27 min        Session Details    Document Type initial evaluation     Mode of Treatment physical therapy        General Information    Patient Profile Reviewed yes     Existing Precautions/Restrictions fall;oxygen therapy device and L/min   3L NC    Limitations/Impairments safety/cognitive        Cognition/Psychosocial    Affect/Mental Status (Cognition) WFL;flat/blunted affect     Orientation Status (Cognition) oriented to;person;place     Follows Commands (Cognition) increased processing time needed     Comment, Cognition inc processing time        Sensory Assessment (Somatosensory)    Sensory Assessment (Somatosensory) LE sensation intact   +edema BLE       Range of Motion (ROM)    Range of Motion ROM is WFL        Strength (Manual Muscle Testing)    Knee, Left (Strength) 2-     Ankle, Left (Strength) 3+     Knee, Right (Strength) 2-     Ankle, Right (Strength) 3+        Bed Mobility    [place], Supine to Sit moderate assist (50-74% patient effort);2 person assist     Comment (Bed Mobility) posterior lean, posterior pelvic tilt, max VCs for upright/anterior position at EOB        Bed to Chair Transfer    [place], Bed to Chair maximum assist (25-49% patient effort);2 person assist     Verbal Cues safety;technique     Assistive Device other (see comments)     Comment stand trial x2, poor upright tolerance  and crouched posture        Sit to Stand Transfer    [place], Sit to Stand Transfer maximum assist (25-49% patient effort);2 person assist     Verbal Cues safety;technique     Assistive Device other (see comments)     Comment assist provided via axilla, crouched posture, stand trial x2        Stand to Sit Transfer    [place], Stand to Sit Transfer maximum assist (25-49% patient effort);2 person assist     Verbal Cues technique;safety     Assistive Device other (see comments)     Comment assist with eccentric control        Safety Issues, Functional Mobility    Impairments Affecting Function (Mobility) endurance/activity tolerance;balance;strength        Balance    Static Sitting Balance moderate impairment     Dynamic Sitting Balance moderate impairment     Static Standing Balance severe impairment     Dynamic Standing Balance severe impairment     Comment, Balance posterior pelvic tilt, rounded shoulders, posterior lean        AM-PAC (TM) - Mobility (Current Function)    Turning from your back to your side while in a flat bed without using bedrails? 2 - A Lot     Moving from lying on your back to sitting on the side of a flat bed without using bedrails? 2 - A Lot     Moving to and from a bed to a chair? 2 - A Lot     Standing up from a chair using your arms? 2 - A Lot     To walk in a hospital room? 1 - Total     Climbing 3-5 steps with a railing? 1 - Total     AM-PAC (TM) Mobility Score 10        Assessment/Plan (PT)    Daily Outcome Statement PT eval complete, pt req mod/max assist for mobility, limited by balance, endurance and strength deficits, anticipate SNF at [place]     Rehab Potential good, to achieve stated therapy goals     Therapy Frequency 5 times/wk     Planned Therapy Interventions bed mobility training;gait training;balance training;transfer training               PT Assessment/Plan    Flowsheet Row Most Recent Value   PT Recommended Discharge Disposition skilled nursing facility at 08/16/2022  0948   Anticipated Equipment Needs at Discharge (PT) --  [TBD] at 08/16/2022 0948   Patient/Family Therapy Goals Statement get stronger at 08/16/2022 0948                    Education Documentation  Energy Conservation, taught by Tres Yen, PT at 8/16/2022  1:58 PM.  Learner: Patient  Readiness: Acceptance  Method: Explanation, Demonstration  Response: Verbalizes Understanding, Demonstrated Understanding, Needs Reinforcement  Comment: PT POC, energy conservation, safety with mobility          PT Goals    Flowsheet Row Most Recent Value   Bed Mobility Goal 1    Activity/Assistive Device rolling to left, rolling to right, sit to supine, supine to sit at 08/16/2022 0948   [place] minimum assist (75% or more patient effort) at 08/16/2022 0948   Time Frame by discharge at 08/16/2022 0948   Progress/Outcome goal ongoing at 08/16/2022 0948   Transfer Goal 1    Activity/Assistive Device bed-to-chair/chair-to-bed, sit-to-stand/stand-to-sit at 08/16/2022 0948   [place] moderate assist (50-74% patient effort) at 08/16/2022 0948   Time Frame by discharge at 08/16/2022 0948   Progress/Outcome goal ongoing at 08/16/2022 0948   Gait Training Goal 1    Activity/Assistive Device gait (walking locomotion), walker, front-wheeled at 08/16/2022 0948   [place] moderate assist (50-74% patient effort) at 08/16/2022 0948   Distance 15 at 08/16/2022 0948   Time Frame by discharge at 08/16/2022 0948   Progress/Outcome goal ongoing at 08/16/2022 0948

## 2022-08-17 ENCOUNTER — APPOINTMENT (INPATIENT)
Dept: RADIOLOGY | Facility: HOSPITAL | Age: 87
DRG: 717 | End: 2022-08-17
Payer: MEDICARE

## 2022-08-17 LAB
ALBUMIN SERPL-MCNC: 1.5 G/DL (ref 3.4–5)
ALP SERPL-CCNC: 113 IU/L (ref 35–126)
ALT SERPL-CCNC: 35 IU/L (ref 16–63)
ANION GAP SERPL CALC-SCNC: 5 MEQ/L (ref 3–15)
ANION GAP SERPL CALC-SCNC: 6 MEQ/L (ref 3–15)
AST SERPL-CCNC: 91 IU/L (ref 15–41)
BILIRUB SERPL-MCNC: 0.7 MG/DL (ref 0.3–1.2)
BUN SERPL-MCNC: 27 MG/DL (ref 8–20)
BUN SERPL-MCNC: 28 MG/DL (ref 8–20)
CA-I BLD-SCNC: 1.13 MMOL/L (ref 1.15–1.27)
CALCIUM SERPL-MCNC: 7.7 MG/DL (ref 8.9–10.3)
CALCIUM SERPL-MCNC: 8 MG/DL (ref 8.9–10.3)
CHLORIDE SERPL-SCNC: 109 MEQ/L (ref 98–109)
CHLORIDE SERPL-SCNC: 111 MEQ/L (ref 98–109)
CO2 SERPL-SCNC: 24 MEQ/L (ref 22–32)
CO2 SERPL-SCNC: 25 MEQ/L (ref 22–32)
CREAT SERPL-MCNC: 0.7 MG/DL (ref 0.8–1.3)
CREAT SERPL-MCNC: 0.8 MG/DL (ref 0.8–1.3)
ERYTHROCYTE [DISTWIDTH] IN BLOOD BY AUTOMATED COUNT: 15.8 % (ref 11.6–14.4)
GFR SERPL CREATININE-BSD FRML MDRD: >60 ML/MIN/1.73M*2
GFR SERPL CREATININE-BSD FRML MDRD: >60 ML/MIN/1.73M*2
GLUCOSE BLD-MCNC: 178 MG/DL (ref 70–99)
GLUCOSE BLD-MCNC: 187 MG/DL (ref 70–99)
GLUCOSE BLD-MCNC: 188 MG/DL (ref 70–99)
GLUCOSE BLD-MCNC: 206 MG/DL (ref 70–99)
GLUCOSE BLD-MCNC: 208 MG/DL (ref 70–99)
GLUCOSE SERPL-MCNC: 192 MG/DL (ref 70–99)
GLUCOSE SERPL-MCNC: 221 MG/DL (ref 70–99)
HCT VFR BLDCO AUTO: 31.5 % (ref 40.1–51)
HGB BLD-MCNC: 10.6 G/DL (ref 13.7–17.5)
INR PPP: 1.3
MAGNESIUM SERPL-MCNC: 1.8 MG/DL (ref 1.8–2.5)
MCH RBC QN AUTO: 34.8 PG (ref 28–33.2)
MCHC RBC AUTO-ENTMCNC: 33.7 G/DL (ref 32.2–36.5)
MCV RBC AUTO: 103.3 FL (ref 83–98)
PDW BLD AUTO: 11.2 FL (ref 9.4–12.4)
PHOSPHATE SERPL-MCNC: 2.2 MG/DL (ref 2.4–4.7)
PLATELET # BLD AUTO: 98 K/UL (ref 150–350)
POCT TEST: ABNORMAL
POTASSIUM SERPL-SCNC: 3.5 MEQ/L (ref 3.6–5.1)
POTASSIUM SERPL-SCNC: 3.7 MEQ/L (ref 3.6–5.1)
PROT SERPL-MCNC: 5.4 G/DL (ref 6–8.2)
PROTHROMBIN TIME: 16.3 SEC (ref 12.2–14.5)
RBC # BLD AUTO: 3.05 M/UL (ref 4.5–5.8)
SODIUM SERPL-SCNC: 138 MEQ/L (ref 136–144)
SODIUM SERPL-SCNC: 142 MEQ/L (ref 136–144)
WBC # BLD AUTO: 9.03 K/UL (ref 3.8–10.5)

## 2022-08-17 PROCEDURE — 92526 ORAL FUNCTION THERAPY: CPT | Mod: GN

## 2022-08-17 PROCEDURE — 36415 COLL VENOUS BLD VENIPUNCTURE: CPT | Performed by: SURGERY

## 2022-08-17 PROCEDURE — 80048 BASIC METABOLIC PNL TOTAL CA: CPT | Performed by: STUDENT IN AN ORGANIZED HEALTH CARE EDUCATION/TRAINING PROGRAM

## 2022-08-17 PROCEDURE — 63600000 HC DRUGS/DETAIL CODE

## 2022-08-17 PROCEDURE — 25000000 HC PHARMACY GENERAL: Performed by: SURGERY

## 2022-08-17 PROCEDURE — 99291 CRITICAL CARE FIRST HOUR: CPT | Performed by: PSYCHIATRY & NEUROLOGY

## 2022-08-17 PROCEDURE — 63700000 HC SELF-ADMINISTRABLE DRUG: Performed by: INTERNAL MEDICINE

## 2022-08-17 PROCEDURE — 99024 POSTOP FOLLOW-UP VISIT: CPT | Performed by: COLON & RECTAL SURGERY

## 2022-08-17 PROCEDURE — 25000000 HC PHARMACY GENERAL

## 2022-08-17 PROCEDURE — 82330 ASSAY OF CALCIUM: CPT | Performed by: SURGERY

## 2022-08-17 PROCEDURE — 63600000 HC DRUGS/DETAIL CODE: Performed by: SURGERY

## 2022-08-17 PROCEDURE — 85027 COMPLETE CBC AUTOMATED: CPT | Performed by: STUDENT IN AN ORGANIZED HEALTH CARE EDUCATION/TRAINING PROGRAM

## 2022-08-17 PROCEDURE — 80053 COMPREHEN METABOLIC PANEL: CPT | Performed by: STUDENT IN AN ORGANIZED HEALTH CARE EDUCATION/TRAINING PROGRAM

## 2022-08-17 PROCEDURE — 84100 ASSAY OF PHOSPHORUS: CPT | Performed by: STUDENT IN AN ORGANIZED HEALTH CARE EDUCATION/TRAINING PROGRAM

## 2022-08-17 PROCEDURE — 99232 SBSQ HOSP IP/OBS MODERATE 35: CPT | Performed by: INTERNAL MEDICINE

## 2022-08-17 PROCEDURE — 63700000 HC SELF-ADMINISTRABLE DRUG: Performed by: SURGERY

## 2022-08-17 PROCEDURE — 70551 MRI BRAIN STEM W/O DYE: CPT | Mod: ME

## 2022-08-17 PROCEDURE — 63600000 HC DRUGS/DETAIL CODE: Performed by: INTERNAL MEDICINE

## 2022-08-17 PROCEDURE — 85610 PROTHROMBIN TIME: CPT | Performed by: STUDENT IN AN ORGANIZED HEALTH CARE EDUCATION/TRAINING PROGRAM

## 2022-08-17 PROCEDURE — 99233 SBSQ HOSP IP/OBS HIGH 50: CPT | Performed by: INTERNAL MEDICINE

## 2022-08-17 PROCEDURE — 25800000 HC PHARMACY IV SOLUTIONS

## 2022-08-17 PROCEDURE — 20000000 HC ROOM AND CARE ICU

## 2022-08-17 PROCEDURE — 83735 ASSAY OF MAGNESIUM: CPT | Performed by: STUDENT IN AN ORGANIZED HEALTH CARE EDUCATION/TRAINING PROGRAM

## 2022-08-17 RX ORDER — ATORVASTATIN CALCIUM 40 MG/1
40 TABLET, FILM COATED ORAL
Status: DISCONTINUED | OUTPATIENT
Start: 2022-08-17 | End: 2022-08-19

## 2022-08-17 RX ORDER — POTASSIUM CHLORIDE 14.9 MG/ML
20 INJECTION INTRAVENOUS ONCE
Status: COMPLETED | OUTPATIENT
Start: 2022-08-17 | End: 2022-08-17

## 2022-08-17 RX ORDER — METOPROLOL TARTRATE 25 MG/1
25 TABLET, FILM COATED ORAL 2 TIMES DAILY
Status: DISCONTINUED | OUTPATIENT
Start: 2022-08-17 | End: 2022-08-19

## 2022-08-17 RX ORDER — NAPROXEN SODIUM 220 MG/1
81 TABLET, FILM COATED ORAL DAILY
Status: DISCONTINUED | OUTPATIENT
Start: 2022-08-17 | End: 2022-08-19

## 2022-08-17 RX ADMIN — INSULIN HUMAN 4 UNITS: 100 INJECTION, SOLUTION PARENTERAL at 19:00

## 2022-08-17 RX ADMIN — INSULIN HUMAN 4 UNITS: 100 INJECTION, SOLUTION PARENTERAL at 23:03

## 2022-08-17 RX ADMIN — LEVOTHYROXINE SODIUM 100 MCG: 0.1 TABLET ORAL at 05:46

## 2022-08-17 RX ADMIN — LANSOPRAZOLE 30 MG: KIT at 08:00

## 2022-08-17 RX ADMIN — HEPARIN SODIUM 5000 UNITS: 5000 INJECTION, SOLUTION INTRAVENOUS; SUBCUTANEOUS at 15:51

## 2022-08-17 RX ADMIN — MAGNESIUM SULFATE HEPTAHYDRATE 2 G: 40 INJECTION, SOLUTION INTRAVENOUS at 05:41

## 2022-08-17 RX ADMIN — COLLAGENASE SANTYL 1 APPLICATION.: 250 OINTMENT TOPICAL at 09:00

## 2022-08-17 RX ADMIN — HEPARIN SODIUM 5000 UNITS: 5000 INJECTION, SOLUTION INTRAVENOUS; SUBCUTANEOUS at 05:46

## 2022-08-17 RX ADMIN — INSULIN ASPART 3 UNITS: 100 INJECTION, SOLUTION INTRAVENOUS; SUBCUTANEOUS at 06:23

## 2022-08-17 RX ADMIN — POTASSIUM CHLORIDE 20 MEQ: 200 INJECTION, SOLUTION INTRAVENOUS at 05:42

## 2022-08-17 RX ADMIN — INSULIN ASPART 3 UNITS: 100 INJECTION, SOLUTION INTRAVENOUS; SUBCUTANEOUS at 12:33

## 2022-08-17 RX ADMIN — HEPARIN SODIUM 5000 UNITS: 5000 INJECTION, SOLUTION INTRAVENOUS; SUBCUTANEOUS at 22:03

## 2022-08-17 RX ADMIN — POTASSIUM CHLORIDE 20 MEQ: 200 INJECTION, SOLUTION INTRAVENOUS at 07:58

## 2022-08-17 RX ADMIN — POTASSIUM PHOSPHATE, MONOBASIC POTASSIUM PHOSPHATE, DIBASIC 15 MMOL: 224; 236 INJECTION, SOLUTION, CONCENTRATE INTRAVENOUS at 12:34

## 2022-08-17 RX ADMIN — DAKIN'S SOLUTION 0.125% (QUARTER STRENGTH): 0.12 SOLUTION at 09:00

## 2022-08-17 ASSESSMENT — COGNITIVE AND FUNCTIONAL STATUS - GENERAL
REMEMBERING WHERE THINGS ARE: 2 - A LOT
REMEMBERING TO TAKE MEDICATION: 1 - UNABLE
AFFECT: CONFUSED;CONFABULATORY
REMEMBERING 5 ERRANDS WITH NO LIST: 2 - A LOT
UNDERSTANDING 10 TO 15 MIN SPEECH: 2 - A LOT
TAKING CARE OF COMPLICATED TASKS: 1 - UNABLE
FOLLOWS FAMILIAR CONVERSATION: 3 - A LITTLE

## 2022-08-17 NOTE — PROGRESS NOTES
"Patient:  Samy Elena  Location:  [facility] SICU SICU12  MRN:  [id number]  Today's date:  8/17/2022  Speech Pathology: Therapy session    SLP Diagnosis: Per video swallow completed 8/16 moderate to severe oral stage and severe pharyngeal stage dysphagia, dysarthria, dysphonia    Recommendations:  1. Strict NPO, medication and nutrition via DHT  2. Strict aspiration precautions including frequent and stringent oral care with suction PRN, HOB > 30 degrees  3. Per neurology, patient is rec'd for brain MRI d/t concerns for stroke   4. Palliative care is currently following patient, consider GOC discussion to outline GOC (feeding at known risk vs consideration for longer-term alt source for nutrition).  5. SLP for ongoing dysphagia follow-up.       Summary/Handoff:  Daily Outcome Statement: Dysphagia follow-up completed at the bedside.  Patient's status post video swallow completed 8/16 with findings of severe pharyngeal dysphagia characterized by aspiration with all consistencies presented secondary to poor laryngeal closure.  SLP reached out to medical team concerning for unknown clear etiology of dysphagia.  ENT consult was placed, NPL showing: \"arytenoid and interarytenoid edema/erythema consistent with mild ETT trauma/irritation,\".  Neurology consult completed with concerns for possible stroke.  Patient was recommended MRI.  SLP received patient in bed, presently confused, poor secretion management with wet vocal quality, coughing.  SLP completed oral care with suction.  Training initiated in tongue base retraction exercises, laryngeal elevation exercises, but patient with reduced ability to complete effectively.  Initiated training and use of breath-hold with goal of utilization as possible strategy during repeat video swallow-patient appeared to utilize correctly with max cues.  At this time, recommend patient remain strict n.p.o., medication and nutrition via DHT.  Consider ongoing discussions " "with palliative care regarding goals of care discussion.  Speech therapy for ongoing follow-up with further training in swallow exercises, compensatory strategies with goal of eventual repeat video swallow if within patient's goals of care.  Prognosis for acute return to p.o. diet is guarded at this time.  Speech to follow-up      Session Notes:     GRBAS: GRBAS Scoring unchanged from previous session.         Dietary Orders   (From admission, onward)             Start     Ordered    08/16/22 1615  Tube Feed with NPO Peptamen AF; Continuous; 35; 65; please advance 10mL every 2 hours; Water; 110; Every 1 hour; RD/LDN may adjust order; AM Snack  (Tube Feed with NPO Diet Orders with insertion and maintenance panels)  Diet effective now        Question Answer Comment   Tube Feeding Formula (LMC): Peptamen AF    Administration Type Continuous    Tube Feeding Start rate (mL/hr): 35    Tube Feeding Goal rate (mL/hr): 65    Nursing Instruction please advance 10mL every 2 hours    Flush type: Water    Flush amount (mL): 110    Flush frequency: Every 1 hour    Delegation of Authority. Diet orders written by PA/CRRonald may not be adjusted by RD/LDNs. RD/LDN may adjust order    Meal period (AM Snack required for CBORD, do not remove: Not clinically relevant) AM Snack       \"And\" Linked Group Details    08/16/22 1616    08/15/22 1134  Dietary nutrition supplements  Once        Question Answer Comment   Select Supplement (LMC): Magic Cup Vanilla    Meal period Breakfast Lunch Dinner        08/15/22 1133    08/15/22 1049  Calorie count  Once         08/15/22 1048                Results from last 7 days   Lab Units 08/17/22  0322 08/16/22  0552 08/15/22  0438   WBC K/uL 9.03 8.41 8.94   HEMOGLOBIN g/dL 10.6* 10.5* 10.5*   HEMATOCRIT % 31.5* 32.2* 32.7*   PLATELETS K/uL 98* 99* 106*          RN cleared SLP to assess/work with patient as no medical issues identified to preclude this therapy session. Following completion of session, pt " remained in bed as they were found with call bell in reach.  Nurse was made aware of patients performance and any changes in status (if applicable).      Samy is a 89 y.o. 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    Past Medical History  Samy has a past medical history of Atrial fibrillation (CMS/HCC), Disease of thyroid gland, GI (gastrointestinal bleed), Hypertension, and Myocardial infarction (CMS/HCC).    History of Present Illness   Admit from OP MD's office 8/2 for denise's gangrene. s/p EUA wound debridement of gluteal NSTI on 8/2 and diverting loop sigmoid colostomy on 8/3  He was then admitted to the surgical ICU where he was intubated and had transient pressor requirements. ETT 8/3-8/12, extubated 8/13. Cleared for thick liquids and puree.      SLP Vitals    Date/Time Pulse Resp SpO2 Pt Activity O2 Therapy BP MAP BP Location BP Method Pt Position [facility]   08/17/22 1000 95 18 95 % At rest None (Room air) 114/64 83 mmHg Left upper arm Automatic Lying JPT      SLP Pain    Date/Time Pain Type Rating: Rest Rating: Activity [facility]   08/17/22 0953 Pain Assessment 0 - no pain 0 - no pain KAYLENE          Prior Living Environment    Flowsheet Row Most Recent Value   Living Environment Comment Lives alone in 2 SH with DE on first floor and bed/bath upstairs. Tub shower. Sons installed stair glide last week.        Prior Level of Function    Flowsheet Row Most Recent Value   Dominant Hand right   Eating independent   Communication difficulty speaking (not related to language barrier)  [per RN, pt's famiyl reports dysarthria PTA]   Swallowing difficulty swallowing foods   Baseline Diet/Method of Nutritional Intake no diet restrictions   Past History of Dysphagia Pt with no hx of dysphagia prior to admission   Prior Level of Function Comment Mod(i) for BADLs and mobility using RW. Has  for meals and IADLs. Sons assist PRN.  "  Assistive Device Currently Used at Home cane, straight, walker, front-wheeled           SLP Evaluation and Treatment - 22 0953        SLP Time Calculation    Start Time 0953     Stop Time 1012     Time Calculation (min) 19 min        Session Details    Document Type daily treatment/progress note     Mode of Treatment speech language pathology;individual therapy        General Information    Patient Profile Reviewed yes     Patient/Family/Caregiver Comments/Observations SLP held extensive discussion with neurology team, neurologya ssessed pt prior to SLP session. See neurology note-c/f stroke, rec'd MRI     General Observations of Patient Pt received in bed, pleasant, some confusion noted.     Existing Precautions/Restrictions aspiration;fall;oxygen therapy device and L/min   RA, +DHT    Limitations/Impairments safety/cognitive;swallowing;hearing        Cognition/Psychosocial    Affect/Mental Status (Cognition) confused;confabulatory     Orientation Status (Cognition) oriented to;person;situation   no , \"hospital\"    Comment, Cognition Pt pleasantly confused, however, appears to understand simple explanation of results of VFSS-c/f recall of information        Cough/Swallow/Gag Reflex (Oral Motor)    Comment, Cough/Swallow/Gag Reflex (Oral Motor) continued junky, weak coughing        Vocal Quality/Secretion Management (Oral Motor)    Vocal Quality (Oral Motor) wet/gurgly;dysphonic;hypophonic     Comment, Vocal Quality/Secretion Management (Oral Motor) pt with wet VQ c/f reduced secretion management. ENT consult complted, NPL completed: \"arytenoid and interarytenoid edema/erythema consistent with mild ETT trauma/irritation,\"        Motor Speech    Speech Intelligibility (Motor Speech) conversational level     Conversational Level, Speech Intelligibility (Motor Speech) moderate impairment     Articulation (Motor Speech) imprecise articulation     Comment, Motor Speech Intervention pt continues with " dysarthria, seen by neurology        Auditory Comprehension    Comment, Intervention (Auditory Comprehension) Pt is able to follow simple commands, answer simple questions. Pt with some confusion noted.        Verbal Expression    Comment, Intervention (Verbal Expression) Pt is pleasantly confused, but is able to make simple requests.        Functional Communication Measures    FCM: Swallowing 2-->Level 2        Non-Instrumental Swallowing Eval (NIS)    Unable to Perform/Complete Swallow Evaluation other (see comments)   targeted TBR exercises, trials with breath hold (single ice chips)       General Swallowing Observations    Current Diet/Method of Nutritional Intake NPO;nasogastric tube (NG)     Signs/Symptoms of Aspiration (Current Diet) concerns for silent aspiration     Respiratory Support (General Swallowing Observations) none     Comment, Secretions/Suctioning c/f reduced secretion management, pt with wet VQ. SLP adjusted pt's posture for upright ~70 degrees     Comment, General Swallowing Observations Pt educated on results of VFSS including rec for strict NPO d/t aspiration observed with all consistencies. SLP initiated training in tongue-base retraction exercises (provided written directions) and training in breath-hold. Pt with difficulty following commands. Pt rec'd MRI for c/f stroke. Pt understands ongoign rec for strict NPO, palliative care is following the patient.        Swallowing Recommendations    Diet Consistency Recommendations NPO     Medication Administration non-oral route     Instrumental Assessment Recommendations reassess via non-instrumental clinical swallow evaluation     Recommend VFSS (Comment) VFSS conducted 8/16-rec strict NPO, see image section for details     Comment, Swallowing Recommendations Strict aspiration precautions including frequent and stringent oral care with suction PRN, HOB > 30 degrees to aid with sec management        Swallowing Intervention    Dysphagia/Swallowing  "Interventions monitor tolerance of;advanced diet/liquid texture trials;oral sensory stimulation program;oral therapeutic exercise program        AM-PAC (TM) - Cognition (Current Function)    Following/understanding a 10-15 minute speech or presentation? 2 - A lot     Understanding familiar people during ordinary conversations? 3 - A little     Remembering to take medications at the appropriate time? 1 - Unable     Remembering where things were placed or put away? 2 - A lot     Remembering a list of 3 or 4 errands without writing it down? 2 - A lot     Taking care of complicated tasks? 1 - Unable     AM-PAC (TM) Cognition Score 11        Assessment/Plan (SLP)    Daily Outcome Statement Dysphagia follow-up completed at the bedside.  Patient's status post video swallow completed 8/16 with findings of severe pharyngeal dysphagia characterized by aspiration with all consistencies presented secondary to poor laryngeal closure.  SLP reached out to medical team concerning for unknown clear etiology of dysphagia.  ENT consult was placed, NPL showing: \"arytenoid and interarytenoid edema/erythema consistent with mild ETT trauma/irritation,\".  Neurology consult completed with concerns for possible stroke.  Patient was recommended MRI.  SLP received patient in bed, presently confused, poor secretion management with wet vocal quality, coughing.  SLP completed oral care with suction.  Training initiated in tongue base retraction exercises, laryngeal elevation exercises, but patient with reduced ability to complete effectively.  Initiated training and use of breath-hold with goal of utilization as possible strategy during repeat video swallow-patient appeared to utilize correctly with max cues.  At this time, recommend patient remain strict n.p.o., medication and nutrition via DHT.  Consider ongoing discussions with palliative care regarding goals of care discussion.  Speech therapy for ongoing follow-up with further training in " "swallow exercises, compensatory strategies with goal of eventual repeat video swallow if within patient's goals of care.  Prognosis for acute return to p.o. diet is guarded at this time.  Speech to follow-up     Rehab Potential other (see comments)   guarded    Therapy Frequency 5 times/wk     Planned Therapy Interventions dysphagia therapy               SLP Assessment/Plan    Flowsheet Row Most Recent Value   SLP Diagnosis Per video swallow completed 8/16 moderate to severe oral stage and severe pharyngeal stage dysphagia, dysarthria, dysphonia at 08/17/2022 0953   Patient/Family Therapy Goal Statement to get better at 08/17/2022 0953               Education Documentation  Signs/Symptoms, taught by Candace Feldman CCC-SLP at 8/17/2022  1:20 PM.  Learner: Patient  Readiness: Acceptance  Method: Explanation, Handout  Response: Needs Reinforcement  Comment: re: aspiration risks, results of VFSS, initiation of swallow [id number], yawn, gargle, high picthed \"eeee\". training in sequence of breath hold    Risk Factors, taught by Candace Feldman CCC-SLP at 8/17/2022  1:20 PM.  Learner: Patient  Readiness: Acceptance  Method: Explanation, Handout  Response: Needs Reinforcement  Comment: re: aspiration risks, results of VFSS, initiation of swallow [id number]-[id number], yawn, gargle, high picthed \"eeee\". training in sequence of breath hold          SLP Goals    Flowsheet Row Most Recent Value   Oral Nutrition/Hydration Goal 1    Activity effective/safe/independent, oral nutrition/hydration, use of swallowing techniques, management of texture/viscosity at 08/13/2022 1026   Time Frame long-term goal (LTG), by discharge at 08/13/2022 1026   Strategies/Barriers ETT x9 days, high oxygen demands at 08/13/2022 1026   Progress/Outcome goal ongoing at 08/17/2022 0953        "

## 2022-08-17 NOTE — PROGRESS NOTES
Endocrinology Progress Note       SUBJECTIVE   Interval History:    Patient is  alert and awake. Confused and hallucinating. Neurology on board.   Failed swallow eval. NPO now.   On TF. Tolerating well.        OBJECTIVE      Vital signs in last 24 hours:  Heart Rate:  [] 98  Resp:  [16-20] 18  BP: ()/(50-76) 100/57      PHYSICAL EXAMINATION        Constitutional: well-developed. well- nourished. Appears lethargic  Eyes: Conjunctivae and EOM are normal. PERRL  Neck: Normal range of motion. Neck supple.    Neurological: Alert and awake      LABS / IMAGING / TELE      Labs    Lab Results   Component Value Date    GLUCOSE 221 (H) 08/17/2022    CALCIUM 8.0 (L) 08/17/2022     08/17/2022    K 3.5 (L) 08/17/2022    CO2 25 08/17/2022     (H) 08/17/2022    BUN 28 (H) 08/17/2022    CREATININE 0.8 08/17/2022     Lab Results   Component Value Date    HGBA1C 6.3 (H) 07/23/2022     Lab Results   Component Value Date    CHOL 94 07/23/2022     Lab Results   Component Value Date    HDL 23 (L) 07/23/2022     Lab Results   Component Value Date    LDLCALC 63 07/23/2022     Lab Results   Component Value Date    TRIG 41 07/23/2022     No results found for: CHOLHDL  Lab Results   Component Value Date    ALT 35 08/17/2022    AST 91 (H) 08/17/2022    ALKPHOS 113 08/17/2022    BILITOT 0.7 08/17/2022      Latest Reference Range & Units Most Recent   TSH 0.34 - 5.60 mIU/L 9.96 (H)  08/14/22 05:44   Free T4 0.58 - 1.64 ng/dL 0.25 (L)  08/14/22 05:44   (H): Data is abnormally high  (L): Data is abnormally low      ASSESSMENT AND PLAN      1 History of preDM  - evidenced with A1c 6.3. on Jardiance 10 mg PTA    - was on oral for 1-2 days. NPO now.   - back on TF Peptamen AF 65 ml/hr     - ordered RI 4 units q 6 for TF  - added RI ss a 6 if needed.      2 necrotizing fasciitis  This occurred in the context of SGLT2 inhibitor use for heart failure  Jardiance has been discontinued.  He should not receive any further  treatment with medications in this class going forward.    3 Hypothyroidism  - Was on LT4 100 mcg daily PTA.    - fabrizio TSH due to missing LT4.   - ordered LT4 70 mcg IV dailyx 2 days, then 100 mcg daily orally.   - repeat TFT in 1 wk.     4, History of CHF - mgt per cardiology service    Thank you very much for allowing me participating in this patient's care. Please feel free to contact me if any questions.          Madonna Dent MD

## 2022-08-17 NOTE — PATIENT CARE CONFERENCE
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

## 2022-08-17 NOTE — PROGRESS NOTES
General Surgery Daily Progress Note    Subjective     Interval History:   Still awaiting floor bed  Has been diuresing with Lasix, cardiology now recommending daily diuresis  Yesterday diuresed with 40 Lasix in the AM - not redosed in PM.   Completed 14d course cefipime yesterday  Tolerating tube feeds. Went for video swallow yesterday and noted to have silent aspirations. ENT and neuro consults recommended. Ostomy remains productive of gas and stool. Nursing continues with BID dressing changes with Santyl + Dakin's    Objective     Vital signs in last 24 hours:  Heart Rate:  [] 95  Resp:  [16-20] 18  BP: ()/(50-76) 111/56      Intake/Output Summary (Last 24 hours) at 8/17/2022 1312  Last data filed at 8/17/2022 1300  Gross per 24 hour   Intake 3510 ml   Output 3690 ml   Net -180 ml     Intake/Output this shift:  I/O this shift:  In: 1300 [NG/GT:1050; IV Piggyback:250]  Out: 640 [Urine:640]    Physical Exam    General appearance: Arousable, A&O x3, non-toxic appearing but frail appearing, DHT secure at nares  Neck: no JVD  Lungs: No increased work of breathing, on room air  Heart: No pressors, regular rhythm, afib  Abdomen: stoma pink and protruding, productive for gas and thickish brown stool. Abdomen soft, distended, non-tender and not tympanitic.   Extremities: extremities normal, warm and well-perfused; no cyanosis, clubbing, or edema  Pulses: 2+ and symmetric  Skin: Skin color, texture, turgor normal. No rashes or lesions  Neurologic: Grossly normal    VTE Assessment: I have reassessed and the patient's VTE risk and treatment plan is appropriate.    Labs    CBC:   Lab Results   Component Value Date    WBC 9.03 08/17/2022    RBC 3.05 (L) 08/17/2022     BMP:   Lab Results   Component Value Date    GLUCOSE 221 (H) 08/17/2022    CO2 25 08/17/2022    BUN 28 (H) 08/17/2022    CREATININE 0.8 08/17/2022    CALCIUM 8.0 (L) 08/17/2022     Coagulation:   Lab Results   Component Value Date    PT 16.3 (H)  08/17/2022    INR 1.3 08/17/2022       Imaging  I have reviewed the Imaging from the last 24 hrs.      Assessment/Plan      89 y/o male with PMH of biventricular heart failure, HTN, Afib, CAD who is now s/p EUA wound debridement of gluteal NSTI on 8/2 and diverting loop sigmoid colostomy on 8/3.    Extubated on 8/13, cleared for thick liquids and puree.     -Awaiting step down bed  -Continue TF @ goal. Defer po intake given +silent aspirations seen on video swallow yesterday   -F/u neuro  -F/u cardiology  -F/u ENT  -Appreciate Endocrine recs regarding insulin management.  -Encourage ISS and pulmonary toilet. Wean O2 as tolerates.   -PT/OT   -WOCN for new ostomy teaching once on floor.   -Continue BID packing changes to wound with santyl and dakrudy.       SOHPIA Portillo   General Surgery  Please page [facility] service [phone] with any questions/concerns.

## 2022-08-17 NOTE — CONSULTS
Consultation Note ENT    Requesting Physician: Mat Bryant MD    CC: hoarseness    HPI: Pt is a 89 y.o. male with PMH including recent prolonged intubated from 8/2 to 8/13 in setting of gluteal NSTI and diverting loop sigmoid colostomy, ENT service consulted for vocal cord evaluation. Patient with hoarseness post-extubation and globus sensation with a lot of phlegm in his throat. States the hoarseness is improving since extubation. SLP recommendation aspiration precautions. Patient with hx of thyroidectomy 20 years ago with no postop voice changes. No other head and neck surgeries. Is not a smoker.    ROS: 14 pt ROS otherwise negative except HPI    PMH:   Past Medical History:   Diagnosis Date   • Atrial fibrillation (CMS/HCC)    • Disease of thyroid gland    • GI (gastrointestinal bleed)    • Hypertension    • Myocardial infarction (CMS/HCC)      - PMH reviewed, no additions.    PSH:   Past Surgical History:   Procedure Laterality Date   • BYPASS GRAFT       - PSH Hx reviewed, no additions    Social Hx:   Social History     Socioeconomic History   • Marital status:      Spouse name: Not on file   • Number of children: Not on file   • Years of education: Not on file   • Highest education level: Not on file   Occupational History   • Not on file   Tobacco Use   • Smoking status: Never Smoker   • Smokeless tobacco: Never Used   Substance and Sexual Activity   • Alcohol use: Not on file   • Drug use: Not on file   • Sexual activity: Not on file   Other Topics Concern   • Not on file   Social History Narrative   • Not on file     Social Determinants of Health     Financial Resource Strain: Not on file   Food Insecurity: No Food Insecurity   • Worried About Running Out of Food in the Last Year: Never true   • Ran Out of Food in the Last Year: Never true   Transportation Needs: Not on file   Physical Activity: Not on file   Stress: Not on file   Social Connections: Not on file   Intimate Partner Violence: Not  on file   Housing Stability: Not on file     - Social hx reviewed, no additions.     MEDS: •  acetaminophen, Take by mouth.  •  bisacodyL, Insert into the rectum.  •  clopidogreL, Take 75 mg by mouth daily.  •  coenzyme Q10, Take 100 mg by mouth daily.  •  empagliflozin, Take 10 mg by mouth daily.  •  levothyroxine, Take 100 mcg by mouth daily.  •  metoprolol succinate XL, Take 100 mg by mouth daily.  •  multivitamin, Take by mouth daily.  •  ENTRESTO, Take 1 tablet by mouth 2 (two) times a day.  •  sildenafiL (pulm.hypertension), Take 20 mg by mouth 3 (three) times a day.  •  simvastatin, Take 10 mg by mouth nightly.  •  spironolactone, Take 25 mg by mouth daily.  •  torsemide, Take 10-20 mg by mouth daily. 10 mg if weight is 176-181 lbs, 20 mg if weight is >181 lbs      Objective:    Vitals:   Vitals:    08/17/22 0700 08/17/22 0800 08/17/22 0900 08/17/22 1000   BP: (!) 104/54 (!) 112/58 (!) 109/56 114/64   BP Location: Left upper arm Left upper arm Left upper arm Left upper arm   Patient Position: Lying Lying Lying Lying   Pulse: 96 91 90 95   Resp: 18 16 18 18   Temp:       TempSrc:       SpO2: 92% 94% 93% 95%   Weight:       Height:           Physical Exam:    GENERAL: Alert and oriented, no acute distress  EARS: Hearing grossly normal, normal pinna, no otorrhea  NOSE: External nose without obvious deformity, no rhinorrhea. DHT in right nares.  OC/OP: MMM, no intraoral masses or lesions  FACE: Symmetric without weakness or gross derangement.  NECK: Soft and supple, no lymphadenopathy  LARYNX: soft voice, no hoarseness, no stridor    Pertinent radiology and labs reviewed    Flexible Nasopharyngoscopy:    Consent: verbal consent for procedure provided by patient    Indication: hoarseness    A 4.0 mm NPL was passed via the patient's right and left nasal passages.     Findings include:    NC/NP: nares patent bilaterally, airway patent, no masses or lesions, no edema or exudate  OP: normal tongue base, airway patent,  no masses or lesions, no edema or exudate  HP: arytenoid and interarytenoid edema/erythema consistent with mild ETT trauma/irritation, normal vallecula, normal epiglottis, normal AE folds, normal false folds, normal, normal pyriform sinuses. Airway patent, no masses or lesions, no edema or exudate.  Glottis: VF mobile and symmetric bilaterally, airway patent, no masses or lesions, no edema or exudate  Subglottis: immediate subglottis that could be visualized is patent without obstructing masses or lesions,    The scope was removed, and the patient tolerated the procedure well. Pt tolerated the procedure well.      Assessment/Plan    89 y.o. male with recent prolonged intubated from 8/2 to 8/13 in setting of gluteal NSTI and diverting loop sigmoid colostomy, ENT consulted for postextubation hoarseness    -airway stable and patent  -NPL as above - mild edema/erythema from where ETT was sitting. VF mobile no masses or lesions  -recommend reflux prophylaxis- H2B/PPI  -diet per SLP  -can fu as an OP with ENT as needed    - Case Discussed with ENT attending      Jarrod Arrington D.O., PGY-3  Otolaryngology-Head & Neck Surgery  [phone]  Call/Text 6a-5p  Call  to [facility] on call on weekends/afternoon

## 2022-08-17 NOTE — PROGRESS NOTES
Cardiology Progress Note   [facility]    [facility]  [facility] [address]  [address] [place] [address]    TEL  [phone]  [facility]       Patient: Samy Elena  MRN: [id number]  : 10/6/1930  Admission Date: 2022   Consult Date: 22  Reason for Consult: H/o HFrEF, PH, atrial fibrillation -postop management   Outpatient Cardiologist: Dr. Yovanny Cruz ( office visit 2022)     Interval History:   Seen and examined early this am.    He was sleeping this morning. Appears comfortable. No chest pain or shortness of breath.       HPI:   Samy Elena is a 89 y.o. 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    Cardiology is being consulted for management recommendations given his extensive cardiac history.     Her chart review, the patient had a recent admission at the end of July with changes in speech. CVA was ruled out at the time of that admission .symptoms was attributed to  postural/orthostatic changes related to symptomatic hypotension. Patient was discharged to rehabilitation unit where he developed  buttocks and pubic pain.He was referred to be seen by surgery as outpatient that lead to current admission.       Past Medical History:   Diagnosis Date   • Atrial fibrillation (CMS/HCC)    • Disease of thyroid gland    • GI (gastrointestinal bleed)    • Hypertension    • Myocardial infarction (CMS/HCC)           Past Surgical History:   Procedure Laterality Date   • BYPASS GRAFT         Social History     Socioeconomic History   • Marital status:      Spouse name: Not on file   • Number of children: Not on file   • Years of education: Not on file   • Highest education level: Not on file   Occupational History   • Not on file    Tobacco Use   • Smoking status: Never Smoker   • Smokeless tobacco: Never Used   Substance and Sexual Activity   • Alcohol use: Not on file   • Drug use: Not on file   • Sexual activity: Not on file   Other Topics Concern   • Not on file   Social History Narrative   • Not on file     Social Determinants of Health     Financial Resource Strain: Not on file   Food Insecurity: No Food Insecurity   • Worried About Running Out of Food in the Last Year: Never true   • Ran Out of Food in the Last Year: Never true   Transportation Needs: Not on file   Physical Activity: Not on file   Stress: Not on file   Social Connections: Not on file   Intimate Partner Violence: Not on file   Housing Stability: Not on file        History reviewed. No pertinent family history.     Jardiance [empagliflozin]    Current Outpatient Medications   Medication Instructions   • acetaminophen (TYLENOL) 325 mg tablet oral   • bisacodyL (DULCOLAX) 10 mg suppository rectal   • clopidogreL (PLAVIX) 75 mg, oral, Daily   • coenzyme Q10 (COQ10) 100 mg, oral, Daily   • empagliflozin (JARDIANCE) 10 mg, oral, Daily   • levothyroxine (SYNTHROID) 100 mcg, oral, Daily (6:30a)   • metoprolol succinate XL (TOPROL-XL) 100 mg, oral, Daily   • multivitamin (THERAGRAN) tablet oral, Daily   • sacubitriL-valsartan (ENTRESTO) 49-51 mg per tablet 1 tablet, oral, 2 times daily   • sildenafiL (pulm.hypertension) (REVATIO) 20 mg, oral, 3 times daily   • simvastatin (ZOCOR) 10 mg, oral, Nightly   • spironolactone (ALDACTONE) 25 mg, oral, Daily   • torsemide (DEMADEX) 10-20 mg, oral, Daily, 10 mg if weight is 176-181 lbs, 20 mg if weight is >181 lbs       Scheduled Meds:  • collagenase  1 application Topical Daily   • heparin (porcine)  5,000 Units subcutaneous q8h IVÁN   • insulin aspart U-100  3-5 Units subcutaneous q6h IVÁN   • insulin regular U-100  3-5 Units subcutaneous q6h IVÁN   • lansoprazole  30 mg feeding tube Daily   • levothyroxine  100 mcg oral Daily (6:30a)   •  "metoprolol tartrate  12.5 mg oral BID   • potassium chloride in water  20 mEq intravenous Once   • sodium hypochlorite   Topical Daily     Continuous Infusions:    PRN Meds:.•  acetaminophen  •  glucose **OR** dextrose **OR** glucagon **OR** dextrose 50 % in water (D50)  •  HYDROmorphone      Intake/Output Summary (Last 24 hours) at 8/17/2022 0918  Last data filed at 8/17/2022 0800  Gross per 24 hour   Intake 3115 ml   Output 4155 ml   Net -1040 ml        Weights (last 7 days)     None           Wt Readings from Last 3 Encounters:   08/03/22 80.3 kg (177 lb)   08/02/22 80.3 kg (177 lb)   07/25/22 81.1 kg (178 lb 11.2 oz)        REVIEW OF SYSTEMS:    A complete review of systems limited by sedation.    PHYSICAL EXAMINATION:  Visit Vitals  BP (!) 112/58 (BP Location: Left upper arm, Patient Position: Lying)   Pulse 91   Temp 36.8 °C (98.2 °F) (Bladder)   Resp 16   Ht 1.854 m (6' 1\")   Wt 80.3 kg (177 lb)   SpO2 94%   BMI 23.35 kg/m²     Body surface area is 2.03 meters squared.  Body mass index is 23.35 kg/m².  General: in NAD   HEENT: No corneal arcus or xanthelasmas.  Sclerae are anicteric.   Neck: JVP is not elevated. prominent v wave .   Heart: s1s2 audible, no significant murmurs , irregular   Chest: Symmetrical.  Lungs:Breathing sounds audible bl .  Abdomen: soft, nt , colostomy in place   Extremities: No cyanosis or clubbing.no  lower extremity edema.  Distal pulses are easily palpable.  Skin: Warm and dry and well perfused.      Labs   Results from last 7 days   Lab Units 08/17/22  0322 08/16/22  1816 08/16/22  0552 08/15/22  1627 08/15/22  0438   SODIUM mEQ/L 142 145* 145*   < > 150*   POTASSIUM mEQ/L 3.5* 3.8 3.6   < > 3.5*   CHLORIDE mEQ/L 111* 114* 117*   < > 120*   CO2 mEQ/L 25 25 24   < > 26   BUN mg/dL 28* 29* 32*   < > 41*   CREATININE mg/dL 0.8 0.8 0.8   < > 0.9   CALCIUM mg/dL 8.0* 8.2* 7.9*   < > 7.9*   ALBUMIN g/dL 1.5*  --  1.5*  --  1.6*   BILIRUBIN TOTAL mg/dL 0.7  --  0.8  --  0.6   ALK PHOS " IU/L 113  --  104  --  94   ALT IU/L 35  --  33  --  28   AST IU/L 91*  --  84*  --  76*   GLUCOSE mg/dL 221* 170* 195*   < > 256*    < > = values in this interval not displayed.       No results found for: HSTROPONINI    Results from last 7 days   Lab Units 08/17/22  0322 08/16/22  0552 08/15/22  0438   WBC K/uL 9.03 8.41 8.94   HEMOGLOBIN g/dL 10.6* 10.5* 10.5*   HEMATOCRIT % 31.5* 32.2* 32.7*   PLATELETS K/uL 98* 99* 106*       Lab Results   Component Value Date    CHOL 94 07/23/2022    HDL 23 (L) 07/23/2022    LDLCALC 63 07/23/2022    TRIG 41 07/23/2022    TSH 9.96 (H) 08/14/2022    HGBA1C 6.3 (H) 07/23/2022       Outside records reviewed..    Imaging  CXR reviewed     Cardiac Studies  TTE 04/13/2022 ( outside report , images not available)     •  A complete transthoracic echocardiogram (including 2D, color flow   Doppler, spectral Doppler and M-mode imaging) was performed using the   standard protocol. The study quality was adequate and poor.  •  The left ventricle is normal in size. Endocardium is incompletely   visualized and segmental wall motion abnormalities cannot be excluded.   Moderately decreased left ventricular ejection fraction. The left   ventricular ejection fraction by visual estimate is 35% to 40%.  •  There is indeterminate diastolic function.  •  The right ventricle is severely dilated. There is moderately to   severely decreased systolic function of the right ventricle.  •  The left atrium is moderately dilated.  •  The right atrium is severely dilated.  •  There is trivial mitral valve leaflet thickening. There is trace mitral   regurgitation. There is no mitral stenosis.  •  The aortic valve is trileaflet. There is mild aortic valve thickening.   There is mild aortic valve calcification. There is no aortic stenosis.   There is mild aortic regurgitation.  •  There is moderate to severe tricuspid regurgitation.  •  The inferior vena cava is dilated (diameter >21 mm) and decreases <50%   in  size with inspiration, suggesting an elevated right atrial pressure of   15 mmHg (range 10-20 mm Hg).  •  There is no prior study available for comparison at this organization.    Left Ventricle  The left ventricle is normal in size. Endocardium is incompletely visualized and segmental wall motion abnormalities cannot be excluded. The left ventricular ejection fraction by visual estimate is 35% to 40%. Moderately decreased left ventricular ejection fraction. There is indeterminate diastolic function.    Right Ventricle  The right ventricle is severely dilated. Off axis imaging of the right ventricle, but visually systolic function appears moderate to severely reduced.    Left Atrium  The left atrium is moderately dilated.    Right Atrium  The right atrium is severely dilated.    IVC/SVC  The inferior vena cava is dilated (diameter >21 mm) and decreases <50% in size with inspiration, suggesting an elevated right atrial pressure of 15 mmHg (range 10-20 mm Hg).    Mitral Valve  There is trivial mitral valve leaflet thickening. There is no mitral stenosis. There is trace mitral regurgitation.    Tricuspid Valve  There is moderate tricuspid annular dilatation. There is no tricuspid stenosis. There is moderate to severe tricuspid regurgitation.    Aortic Valve  The aortic valve is trileaflet. There is mild aortic valve thickening. There is mild aortic valve calcification. There is no aortic stenosis. There is mild aortic regurgitation.    Pulmonic Valve  There is no pulmonic stenosis. There is mild pulmonic valve regurgitation.    Pericardium  There are echocardiographic findings consistent with fat pad.    Aortic Arch/Descending/Abdominal Aorta  The aortic root is normal in size. The proximal segment of the ascending aorta is normal in size.     ECG   07/22/2022   Atrial fibrillation   rbbb   twi on precordial leads, present on ecg 07/2022     Telemetry  Atrial fibrillation , rbbb with ventricular response in ,  frequent pvcs    Assessment:  This is a 89 y.o. male being consulted for cardiovascular management    #Justin's gangrene:   #Septic shock:     Management per primary service   On abx; id has been following   empagliflozin should be discontinued at the time of discharge. He was recently started on this agent in may 2022      # iCM:  # HFmrEF:  # PH on sildenafil as outpatient:    NYHA class III and he has ACC/AHA Stage C as outpatient previously     On norepinephrin 12 following hydromorphone and now weaning back down     His lvef appears 40-45% with severe RV dilation and diastolic dysfunction.     S/p septic shock; extubated and off pressors     Can decrease diuretic dose to daily  Strict intake and output   Continue holding entresto spironolactone and sildenafil; these can be gradually introduced back within the next 24-48 h     # Permanent atrial fibrillation:   # Frequent PVCs:    [id number]     Rates in the     Not on AC as outpatient given history of GIB   continue metoprolol tartrate; increase to 25 mg bid if bp can tolerate   Maintain K>4 , Mg > 2     Case  discussed with Dr. Davila. Final recommendations are pending attending co-signature. Please page Cardiology at [phone] with any questions.     Sabiha Cordon MD  8/17/2022

## 2022-08-17 NOTE — CONSULTS
"  Neurology Consult Note    Reason for Consultation: Dysarthria/Bulbar weakness  Chief Complaint: \"She took me driving around the hospital yesterday looking for beer\"    History of Presenting Illness:  The patient is a 89 year old Right handed man with reported medical history most notable for CAD, A Fib, advanced CHF, advanced Tricuspid regurgitation and Pulmonary Hypertension (on Sildenafil), with additional history most notable for severe GI bleeding in the past resulting in anticoagulation cessation since. The patient is currently admitted to the hospital since 8/2/2022 for management of Justin gangrene/Necrotizing Fasciitis of the perineum, requiring initial surgical management with wound debridement and wash out on 8/2/22, followed by repeat surgical intervention with Laparoscopic Sigmoid Colostomy creation and repeat wound washout on 8/3/2022. The patient's condition was severely complicated by sustained Septic and Cardiogenic Shock, requiring multiple IV Vasopressors/Inotropic medication drips for multiple days after his admission. He was finally weaned completely off of all Pressor support on 8/10/2022. He has additionally been treated with intensive IV Antibiotic regimen since his admission, which was concluded yesterday.     The patient had remained Intubated since admission thru 8/12/2022. After his extubation, the patient has underwent serial Speech/Swallow evaluations which have revealed persistent severe silent aspiration with all consistencies, in addition to persistent severe dysarthria. Hence, the SLP team recommended for a Neurology evaluation at this time.    I have reviewed the patient's chart for pertinent neurologic details. I have also discussed the patient's case with Candace/SLP team in detail today, and reviewed their findings and concerns in this case. I have separately discussed the patient's case with his Primary Surgery team as well.     On my current evaluation, the patient is " "extubated, awake and fairly talkative. However, he is very pleasantly delirious, and does not have adequate insight into his current illness, although is aware of being at [facility] for the past several days, and is even aware of having undergone surgery. He denies any complaints at all, and rather spends most of the time describing how he was driven around the hospital yesterday by my PA Katherine (who was accompanying myself during this assessment), in search for \"beer\".     Review of Systems  Limited due to Delirium. Otherwise, pertinent items noted in the HPI above.    Allergies: Jardiance [empagliflozin]    Current Inpatient Medications   Medication Dose Route Frequency Provider Last Rate Last Admin   • acetaminophen (TYLENOL) 650 mg/20.3 mL solution 650 mg  650 mg oral q4h PRN Avril Chester MD   650 mg at 08/08/22 2152   • aspirin chewable tablet 81 mg  81 mg oral Daily Emilie Alvarez, DO       • collagenase (SANTYL) 250 unit/gram ointment 1 application  1 application Topical Daily Tin Singer MD   1 application at 08/16/22 0805   • glucose chewable tablet 16-32 g of dextrose  16-32 g of dextrose oral PRN Joe Eduardo DO        Or   • dextrose 40 % oral gel 15-30 g of dextrose  15-30 g of dextrose oral PRN Joe Eduardo DO        Or   • glucagon (GLUCAGEN) injection 1 mg  1 mg intramuscular PRN Joe Eduardo DO        Or   • dextrose 50 % in water (D50) injection 12.5 g  25 mL intravenous PRN Joe Eduardo DO       • heparin (porcine) 5,000 unit/mL injection 5,000 Units  5,000 Units subcutaneous q8h Samy Romeo DO   5,000 Units at 08/17/22 0546   • HYDROmorphone (DILAUDID) injection 0.25 mg  0.25 mg intravenous q3h PRN Connor Evans MD       • insulin aspart U-100 (NovoLOG) pen 3-5 Units  3-5 Units subcutaneous q6h Joe Friedman DO   3 Units at 08/17/22 0623   • insulin regular U-100 (HumuLIN R,NovoLIN R) injection 3-5 Units  3-5 Units subcutaneous " q6h [facility] Joe Eduardo DO       • lansoprazole (PREVACID) 3 mg/mL oral suspension 30 mg  30 mg feeding tube Daily Samy Haddad DO   30 mg at 08/17/22 0800   • levothyroxine (SYNTHROID) tablet 100 mcg  100 mcg oral Daily (6:30a) Madonna Dent MD   100 mcg at 08/17/22 0546   • metoprolol tartrate (LOPRESSOR) split tablet 12.5 mg  12.5 mg oral BID David Ma DO   12.5 mg at 08/15/22 0853   • sodium hypochlorite (DAKIN'S (QUARTER-STRENGTH)) 0.125 % external solution   Topical Daily Samy Haddad DO   Given at 08/16/22 0805       Medical History:   Past Medical History:   Diagnosis Date   • Atrial fibrillation (CMS/HCC)    • Disease of thyroid gland    • GI (gastrointestinal bleed)    • Hypertension    • Myocardial infarction (CMS/HCC)        Surgical History:   Past Surgical History:   Procedure Laterality Date   • BYPASS GRAFT         Social History:   Social History     Socioeconomic History   • Marital status:      Spouse name: None   • Number of children: None   • Years of education: None   • Highest education level: None   Tobacco Use   • Smoking status: Never Smoker   • Smokeless tobacco: Never Used     Social Determinants of Health     Food Insecurity: No Food Insecurity   • Worried About Running Out of Food in the Last Year: Never true   • Ran Out of Food in the Last Year: Never true       Family History: History reviewed. No pertinent family history.    Objective   Heart Rate:  [] 95  Resp:  [16-20] 18  BP: ()/(50-76) 114/64  SpO2:  [90 %-98 %] 95 %  Oxygen Therapy: None (Room air)  O2 Delivery Method: Nasal cannula    NIHSS Score: 11 (Right arm drift-1; Left arm drift/weakness-2; Right leg weakness-3; Left leg weakness-3; Right arm ataxia-1; Moderate dysarthria-1)    Physical Exam:  General Appearance: Elderly frail appearing man, critically ill appearing, resting in bed, NG tube in place  Cardiac: Irregular rhythm, moderate anasarca    Neurologic Exam:  Head/Neck: No scalp tenderness on  "palpation. No nuchal rigidity noted.  Mental Status:  - Level of Consciousness: Awake appearing  - Alertness: Mostly alert in response to voice  - Attention: Moderately impaired sustained cognitive attention   - Thought process: Tangential  - Thought Content: Active delusions about being driven around the hospital yesterday, looking for beer  - Orientation: Intact to person/self including name, age and date of birth; Surprising well oriented to time (correct month/year, date \"15th\"); Surprisingly well oriented to location  - Neglect - No unilateral visual or spatial neglect noted on exam  - Mood/Affect - Normal mood with euthymic appearing affect  - Behavior/Cooperation - Very pleasant and cooperative with exam    - Language: Speech is fluent with preserved prosody  1. Naming - Names all simple objects to confrontation  2. Comprehension - Able to follow simple commands well; Unable to follow any complex commands; Unable to follow any 2 step commands; Unable to follow any commands requiring midline crossing.  3. Repetition - Able to repeat simple sentences without any errors  - Calculations: Unable to perform simple calculations  - No gross ideomotor apraxia with simpler motor tasks    Cranial Nerves:  CN II: Visual Fields: Intact to confrontation in all quadrants bilaterally without any neglect on double simultaneous stimulation; Pupils are equal, round and reactive to light bilaterally  CN III/IV/VI: Extraocular movements are intact bilaterally in all horizontal directions without any nystagmus; reduced up gaze bilaterally.  CN V: Normal facial sensation bilaterally  CN VII: Largely symmetric facial movement at rest, with smiling and with speech  CN VIII: Hearing cannot be assessed - wearing hearing aides with good results  CN IX/X: No clear uvular deviation, Moderate dysarthria noted  CN XI: Normal/strong head turning bilaterally; Normal 5/5 shoulder shrug bilaterally  CN XII: Normal/midline tongue " protrusion    Motor:  Bulk: No focal atrophy, but poor muscle bulk throughout  Tone: Normal tone without any spasticity or hypotonia noted on exam  Bradykinesia: Moderate non-decrementing slowness on finger tapping on the Left, compared to milder deficits on the Right.  Abnormal movements: Absent    Pronator Drift and Strength:  Right arm: Mild drift in 10 seconds, with mild weakness proximally  Left arm: Moderate to severe drift hitting the bed within 3 to 4 seconds, with moderate to severe weakness proximally, improving distally  Right leg: No effort against gravity proximally but good strength at the foot/planter flexion  Left leg: No effort against gravity proximally, with mild to moderate weakness at the foot/planter flexion    Sensory:  Sensation intact to light touch in both upper and lower extremities bilaterally. No sensory neglect.    Reflexes:  Reflex Name: Right Left  Biceps                1+   1+  Brachioradialis   1+   1+  Knee Jerk          1+   1+  Ankle Jerk   Absent  Absent  Babinski     Absent   Absent  Clonus          None   None    Coordination:  Mild to moderate dysmetria on Finger to nose with Right hand. Unable to adequate test the Left due to weakness.   Unable to test lower extremities due to weakness    Gait: Cannot be tested at this time            Labs: I have reviewed the following lab results:   Recent Results (from the past 24 hour(s))   POCT Glucose    Collection Time: 08/16/22 12:10 PM   Result Value Ref Range    POCT Bedside Glucose 201 (H) 70 - 99 mg/dL    POC Test POC    POCT Glucose    Collection Time: 08/16/22  6:10 PM   Result Value Ref Range    POCT Bedside Glucose 148 (H) 70 - 99 mg/dL    POC Test POC    Basic metabolic panel    Collection Time: 08/16/22  6:16 PM   Result Value Ref Range    Sodium 145 (H) 136 - 144 mEQ/L    Potassium 3.8 3.6 - 5.1 mEQ/L    Chloride 114 (H) 98 - 109 mEQ/L    CO2 25 22 - 32 mEQ/L    BUN 29 (H) 8 - 20 mg/dL    Creatinine 0.8 0.8 - 1.3 mg/dL     Glucose 170 (H) 70 - 99 mg/dL    Calcium 8.2 (L) 8.9 - 10.3 mg/dL    eGFR >60.0 >=60.0 mL/min/1.73m*2    Anion Gap 6 3 - 15 mEQ/L   POCT Glucose    Collection Time: 08/16/22 10:51 PM   Result Value Ref Range    POCT Bedside Glucose 203 (H) 70 - 99 mg/dL    POC Test POC    Calcium, ionized    Collection Time: 08/17/22  3:22 AM   Result Value Ref Range    Ionized Calcium, Venous 1.13 (L) 1.15 - 1.27 mmol/L   Comprehensive metabolic panel    Collection Time: 08/17/22  3:22 AM   Result Value Ref Range    Sodium 142 136 - 144 mEQ/L    Potassium 3.5 (L) 3.6 - 5.1 mEQ/L    Chloride 111 (H) 98 - 109 mEQ/L    CO2 25 22 - 32 mEQ/L    BUN 28 (H) 8 - 20 mg/dL    Creatinine 0.8 0.8 - 1.3 mg/dL    Glucose 221 (H) 70 - 99 mg/dL    Calcium 8.0 (L) 8.9 - 10.3 mg/dL    AST (SGOT) 91 (H) 15 - 41 IU/L    ALT (SGPT) 35 16 - 63 IU/L    Alkaline Phosphatase 113 35 - 126 IU/L    Total Protein 5.4 (L) 6.0 - 8.2 g/dL    Albumin 1.5 (L) 3.4 - 5.0 g/dL    Bilirubin, Total 0.7 0.3 - 1.2 mg/dL    eGFR >60.0 >=60.0 mL/min/1.73m*2    Anion Gap 6 3 - 15 mEQ/L   Magnesium    Collection Time: 08/17/22  3:22 AM   Result Value Ref Range    Magnesium 1.8 1.8 - 2.5 mg/dL   Phosphorus    Collection Time: 08/17/22  3:22 AM   Result Value Ref Range    Phosphorus 2.2 (L) 2.4 - 4.7 mg/dL   Protime-INR    Collection Time: 08/17/22  3:22 AM   Result Value Ref Range    PT 16.3 (H) 12.2 - 14.5 sec    INR 1.3     CBC    Collection Time: 08/17/22  3:22 AM   Result Value Ref Range    WBC 9.03 3.80 - 10.50 K/uL    RBC 3.05 (L) 4.50 - 5.80 M/uL    Hemoglobin 10.6 (L) 13.7 - 17.5 g/dL    Hematocrit 31.5 (L) 40.1 - 51.0 %    .3 (H) 83.0 - 98.0 fL    MCH 34.8 (H) 28.0 - 33.2 pg    MCHC 33.7 32.2 - 36.5 g/dL    RDW 15.8 (H) 11.6 - 14.4 %    Platelets 98 (L) 150 - 350 K/uL    MPV 11.2 9.4 - 12.4 fL   POCT Glucose    Collection Time: 08/17/22  6:22 AM   Result Value Ref Range    POCT Bedside Glucose 208 (H) 70 - 99 mg/dL    POC Test POC    POCT Glucose    Collection  Time: 08/17/22  7:38 AM   Result Value Ref Range    POCT Bedside Glucose 188 (H) 70 - 99 mg/dL    POC Test POC        Imaging:  I have personally reviewed the images for the NeuroImaging studies listed below. My interpretation is noted as following:      MRI  Brain without contrast: 7/23/2022   - No acute abnormalities, including no acute stroke or hemorrhage noted.  - Mild chronic white matter changes.  - Moderately advanced diffuse cerebral and cerebellar atrophy noted.     MRA Head/Neck without contrast: 7/23/2022   - Accuracy/Reliability limited due to inherently lower resolution for MRA, presence of motion and other artifacts, and lack of IV contrast use. Within these limitations, the following potential findings are noted:  1. Intracranial atherosclerosis. 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  2. No clear stenosis in the visualized portion of extracranial carotid or vertebral arteries.      Cardiac results:   Echocardiogram: 8/3/22  · Normal-sized LV. Mildly decreased LV systolic function. Estimated EF 40- 45%. Markedly abnormal LV septal wall motion. Normal LV wall thickness. Unable to assess diastolic filling pattern of the LV.  · Severely dilated RV. Severely reduced RV systolic function.  · Mildly dilated LA.  · Severely dilated RA. Catheter present in the RA.  · Aorta not well visualized. Aortic root sclerotic.  · Sclerotic aortic valve leaflets. Trace aortic valve regurgitation.  · Sclerotic mitral valve. Trace mitral valve regurgitation.  · Pulmonic valve not well visualized.  · Dilated tricuspid annulus with leaflet failure to coapt. Large gap visualized, approximately 1 cm. Severe tricuspid valve regurgitation. Vena contracta 1.0cm. Unable to estimate RVSP in the setting of severe tricuspid regurgitation and single-chamber physiology. Reversed hepatic vein systolic flow.  · IVC is dilated (>2.1cm). IVC collapses  <50% during inspiration.  · No evidence of pericardial effusion.  · No prior study available for comparison.          Impression/Assessment:  1. The patient's current neurologic examination reveals moderate to severe dysarthria, Left-predominant bilateral arm and leg weakness, along with ataxic features. While the patient's advanced baseline co-morbidities put the patient at very high risk for physical decompensation with any acute illness (which this hospitalization certainly represents), the asymmetric features on the patient's current neurologic examination are very atypical for such critical illness related decompensation alone. Rather, the patient's current neurologic deficits raise strong degree of suspicion for the presence of Acute Cerebral Ischemic injury involving either the Brainstem or bilateral cerebral hemispheres. 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  -- Other neurologic differential considerations will be explored if the patient's work up for acute cerebral ischemia is unexpectedly negative.    2. In addition to the focal neurologic deficits discussed above, the patient's neurologic examination reveals Mild, Acute Delirium, which is likely a cumulative effect from the patient's multi-faceted critical illness.      Recommendations:  For further neurologic work up:  1. Please obtain MRI Brain without contrast as soon as possible to definitively evaluate the burden/distribution of acute cerebral injury.  2. Will determine if repeat Cerebral Vessel imaging or Echocardiogram are necessary in this case after the MRI results - Both studies were completed within the past 2 to 4 weeks.  3. Will determine if additional lab work up is necessary based on MRI results and clinical course.  Otherwise, basic Stroke labs were obtained 3 to 4 weeks ago, and are reviewed.    For further neurologic management:  1. Please initiate Antiplatelet therapy with Aspirin 81 mg daily at this time, unless there are any surgical contraindications against such treatment. Will determine further Antiplatelet and/or Anticoagulation therapy based on Goals of Care and medical/surgical condition, but after the MRI Brain is available.  2. Please initiate high intensity Statin therapy with Atorvastatin 40 mg daily, unless any medical contraindications are noted.  3. Please maintain appropriate Blood Pressure support, with suggested goal MAP > 70 at all times.  4. Please maintain appropriate glycemic control, avoiding significant/sustained hypoglycemia and hyperglycemia in this case  5. Please obtain PT/OT evaluations when possible  6. Please follow up SLP recommendations regarding dysphagia management. Please maintain strict Aspiration precautions.  7. Please use appropriate DVT and GI prophylaxis.    Once the patient's MRI Brain is obtained, we will able to make some assessment regarding possible recovery of the patient's current neurologic deficits, especially his dysphagia. This will assist with further Goals of Care discussion, especially since it has already been established that long term tube feeding is not supported by the patient's currently established goals of care. Hence, if the patient is found to have a significant burden of acute cerebral ischemia/infarction, a more Palliative care based approach may become more appropriate in this case.    The patient's case/recommendations have been discussed with Primary Surgery/ICU team in detail today.      Critical Care time: 60 minutes

## 2022-08-17 NOTE — PROGRESS NOTES
Patient: Samy Elena  Location: [facility] SICU SICU12  MRN:  [id number]  Today's date:  8/17/2022    Attempted to see patient for therapy. Unable due to patient at test or procedure (plans for MRI to assess weakness and possible facial droop per neuro and SLP).

## 2022-08-18 LAB
ALBUMIN SERPL-MCNC: 1.3 G/DL (ref 3.4–5)
ALP SERPL-CCNC: 123 IU/L (ref 35–126)
ALT SERPL-CCNC: 33 IU/L (ref 16–63)
ANION GAP SERPL CALC-SCNC: 5 MEQ/L (ref 3–15)
AST SERPL-CCNC: 87 IU/L (ref 15–41)
BILIRUB SERPL-MCNC: 0.9 MG/DL (ref 0.3–1.2)
BUN SERPL-MCNC: 27 MG/DL (ref 8–20)
CA-I BLD-SCNC: 1.11 MMOL/L (ref 1.15–1.27)
CALCIUM SERPL-MCNC: 7.6 MG/DL (ref 8.9–10.3)
CHLORIDE SERPL-SCNC: 108 MEQ/L (ref 98–109)
CO2 SERPL-SCNC: 24 MEQ/L (ref 22–32)
CREAT SERPL-MCNC: 0.7 MG/DL (ref 0.8–1.3)
ERYTHROCYTE [DISTWIDTH] IN BLOOD BY AUTOMATED COUNT: 16.2 % (ref 11.6–14.4)
GFR SERPL CREATININE-BSD FRML MDRD: >60 ML/MIN/1.73M*2
GLUCOSE BLD-MCNC: 108 MG/DL (ref 70–99)
GLUCOSE BLD-MCNC: 120 MG/DL (ref 70–99)
GLUCOSE BLD-MCNC: 134 MG/DL (ref 70–99)
GLUCOSE BLD-MCNC: 161 MG/DL (ref 70–99)
GLUCOSE SERPL-MCNC: 166 MG/DL (ref 70–99)
HCT VFR BLDCO AUTO: 29.2 % (ref 40.1–51)
HGB BLD-MCNC: 9.8 G/DL (ref 13.7–17.5)
INR PPP: 1.3
MAGNESIUM SERPL-MCNC: 1.9 MG/DL (ref 1.8–2.5)
MCH RBC QN AUTO: 34.8 PG (ref 28–33.2)
MCHC RBC AUTO-ENTMCNC: 33.6 G/DL (ref 32.2–36.5)
MCV RBC AUTO: 103.5 FL (ref 83–98)
PDW BLD AUTO: 10.9 FL (ref 9.4–12.4)
PHOSPHATE SERPL-MCNC: 2 MG/DL (ref 2.4–4.7)
PLATELET # BLD AUTO: 113 K/UL (ref 150–350)
POCT TEST: ABNORMAL
POTASSIUM SERPL-SCNC: 3.6 MEQ/L (ref 3.6–5.1)
PROT SERPL-MCNC: 4.9 G/DL (ref 6–8.2)
PROTHROMBIN TIME: 15.9 SEC (ref 12.2–14.5)
RBC # BLD AUTO: 2.82 M/UL (ref 4.5–5.8)
SARS-COV-2 RNA RESP QL NAA+PROBE: POSITIVE
SODIUM SERPL-SCNC: 137 MEQ/L (ref 136–144)
WBC # BLD AUTO: 8.51 K/UL (ref 3.8–10.5)

## 2022-08-18 PROCEDURE — 85610 PROTHROMBIN TIME: CPT | Performed by: STUDENT IN AN ORGANIZED HEALTH CARE EDUCATION/TRAINING PROGRAM

## 2022-08-18 PROCEDURE — 63700000 HC SELF-ADMINISTRABLE DRUG: Performed by: INTERNAL MEDICINE

## 2022-08-18 PROCEDURE — 36415 COLL VENOUS BLD VENIPUNCTURE: CPT | Performed by: SURGERY

## 2022-08-18 PROCEDURE — 63700000 HC SELF-ADMINISTRABLE DRUG

## 2022-08-18 PROCEDURE — 82330 ASSAY OF CALCIUM: CPT | Performed by: SURGERY

## 2022-08-18 PROCEDURE — U0002 COVID-19 LAB TEST NON-CDC: HCPCS | Performed by: PHYSICIAN ASSISTANT

## 2022-08-18 PROCEDURE — 21400000 HC ROOM AND CARE CCU/INTERMEDIATE

## 2022-08-18 PROCEDURE — 63600000 HC DRUGS/DETAIL CODE: Performed by: SURGERY

## 2022-08-18 PROCEDURE — 92526 ORAL FUNCTION THERAPY: CPT | Mod: GN

## 2022-08-18 PROCEDURE — 63700000 HC SELF-ADMINISTRABLE DRUG: Performed by: SURGERY

## 2022-08-18 PROCEDURE — 84100 ASSAY OF PHOSPHORUS: CPT | Performed by: STUDENT IN AN ORGANIZED HEALTH CARE EDUCATION/TRAINING PROGRAM

## 2022-08-18 PROCEDURE — 63600000 HC DRUGS/DETAIL CODE

## 2022-08-18 PROCEDURE — 99024 POSTOP FOLLOW-UP VISIT: CPT | Performed by: COLON & RECTAL SURGERY

## 2022-08-18 PROCEDURE — 25000000 HC PHARMACY GENERAL

## 2022-08-18 PROCEDURE — 25800000 HC PHARMACY IV SOLUTIONS

## 2022-08-18 PROCEDURE — 99232 SBSQ HOSP IP/OBS MODERATE 35: CPT | Performed by: INTERNAL MEDICINE

## 2022-08-18 PROCEDURE — 99233 SBSQ HOSP IP/OBS HIGH 50: CPT | Performed by: PSYCHIATRY & NEUROLOGY

## 2022-08-18 PROCEDURE — 85027 COMPLETE CBC AUTOMATED: CPT | Performed by: STUDENT IN AN ORGANIZED HEALTH CARE EDUCATION/TRAINING PROGRAM

## 2022-08-18 PROCEDURE — 80053 COMPREHEN METABOLIC PANEL: CPT | Performed by: STUDENT IN AN ORGANIZED HEALTH CARE EDUCATION/TRAINING PROGRAM

## 2022-08-18 PROCEDURE — 83735 ASSAY OF MAGNESIUM: CPT | Performed by: STUDENT IN AN ORGANIZED HEALTH CARE EDUCATION/TRAINING PROGRAM

## 2022-08-18 RX ORDER — TORSEMIDE 20 MG/1
10 TABLET ORAL DAILY
Status: DISCONTINUED | OUTPATIENT
Start: 2022-08-18 | End: 2022-08-19

## 2022-08-18 RX ORDER — POTASSIUM CHLORIDE 14.9 MG/ML
20 INJECTION INTRAVENOUS ONCE
Status: COMPLETED | OUTPATIENT
Start: 2022-08-18 | End: 2022-08-18

## 2022-08-18 RX ADMIN — POTASSIUM PHOSPHATE, MONOBASIC POTASSIUM PHOSPHATE, DIBASIC 20 MMOL: 224; 236 INJECTION, SOLUTION, CONCENTRATE INTRAVENOUS at 11:54

## 2022-08-18 RX ADMIN — LEVOTHYROXINE SODIUM 100 MCG: 0.1 TABLET ORAL at 06:26

## 2022-08-18 RX ADMIN — COLLAGENASE SANTYL 1 APPLICATION.: 250 OINTMENT TOPICAL at 12:03

## 2022-08-18 RX ADMIN — INSULIN HUMAN 4 UNITS: 100 INJECTION, SOLUTION PARENTERAL at 11:55

## 2022-08-18 RX ADMIN — DAKIN'S SOLUTION 0.125% (QUARTER STRENGTH): 0.12 SOLUTION at 12:04

## 2022-08-18 RX ADMIN — INSULIN HUMAN 4 UNITS: 100 INJECTION, SOLUTION PARENTERAL at 06:33

## 2022-08-18 RX ADMIN — POTASSIUM CHLORIDE 20 MEQ: 200 INJECTION, SOLUTION INTRAVENOUS at 08:46

## 2022-08-18 RX ADMIN — HEPARIN SODIUM 5000 UNITS: 5000 INJECTION, SOLUTION INTRAVENOUS; SUBCUTANEOUS at 15:16

## 2022-08-18 RX ADMIN — INSULIN HUMAN 4 UNITS: 100 INJECTION, SOLUTION PARENTERAL at 18:33

## 2022-08-18 RX ADMIN — HEPARIN SODIUM 5000 UNITS: 5000 INJECTION, SOLUTION INTRAVENOUS; SUBCUTANEOUS at 06:26

## 2022-08-18 RX ADMIN — TORSEMIDE 10 MG: 20 TABLET ORAL at 08:51

## 2022-08-18 RX ADMIN — HEPARIN SODIUM 5000 UNITS: 5000 INJECTION, SOLUTION INTRAVENOUS; SUBCUTANEOUS at 21:44

## 2022-08-18 RX ADMIN — MAGNESIUM SULFATE IN DEXTROSE 1 G: 10 INJECTION, SOLUTION INTRAVENOUS at 09:20

## 2022-08-18 RX ADMIN — ASPIRIN 81 MG: 81 TABLET, CHEWABLE ORAL at 08:51

## 2022-08-18 RX ADMIN — LANSOPRAZOLE 30 MG: KIT at 08:46

## 2022-08-18 ASSESSMENT — COGNITIVE AND FUNCTIONAL STATUS - GENERAL
UNDERSTANDING 10 TO 15 MIN SPEECH: 2 - A LOT
FOLLOWS FAMILIAR CONVERSATION: 2 - A LOT
TAKING CARE OF COMPLICATED TASKS: 1 - UNABLE
AFFECT: CONFUSED
REMEMBERING 5 ERRANDS WITH NO LIST: 1 - UNABLE
REMEMBERING TO TAKE MEDICATION: 1 - UNABLE
REMEMBERING WHERE THINGS ARE: 1 - UNABLE

## 2022-08-18 NOTE — PROGRESS NOTES
General Surgery Daily Progress Note    Subjective     Interval History:     Evaluated by Neuro and ENT yesterday for failed swallow study. ENT recommends no acute intervention for oropharyngeal dysfunction. Neuro rec MRI for concerns for stroke and start ASA.  MRI obtained yesterday negative for acute infarct. Remains on tube feeds and tolerating    Pleasantly delirious, but A&Ox3, no nausea or vomiting, tolerating TF, ostomy functioning     Objective     Vital signs in last 24 hours:  Temp:  [37.5 °C (99.5 °F)] 37.5 °C (99.5 °F)  Heart Rate:  [87-98] 97  Resp:  [16-20] 20  BP: ()/(50-65) 112/59      Intake/Output Summary (Last 24 hours) at 8/18/2022 0704  Last data filed at 8/18/2022 0600  Gross per 24 hour   Intake 4000 ml   Output 1645 ml   Net 2355 ml     Intake/Output this shift:  No intake/output data recorded.    Physical Exam    General appearance: Arousable, A&O x3, non-toxic appearing but frail appearing, DHT secure at nares  Neck: no JVD  Lungs: No increased work of breathing, on room air  Heart: No pressors, regular rhythm, afib  Abdomen: stoma pink and protruding, productive for gas and thickish brown stool. Abdomen soft, distended, non-tender and not tympanitic.   Extremities: extremities normal, warm and well-perfused; no cyanosis, clubbing, or edema  Pulses: 2+ and symmetric  Skin: Skin color, texture, turgor normal. No rashes or lesions  Neurologic: Grossly normal    VTE Assessment: I have reassessed and the patient's VTE risk and treatment plan is appropriate.    Labs    CBC:   Lab Results   Component Value Date    WBC 8.51 08/18/2022    RBC 2.82 (L) 08/18/2022     BMP:   Lab Results   Component Value Date    GLUCOSE 166 (H) 08/18/2022    CO2 24 08/18/2022    BUN 27 (H) 08/18/2022    CREATININE 0.7 (L) 08/18/2022    CALCIUM 7.6 (L) 08/18/2022     Coagulation:   Lab Results   Component Value Date    PT 15.9 (H) 08/18/2022    INR 1.3 08/18/2022       Imaging  I have reviewed the Imaging from  the last 24 hrs.      Assessment/Plan      89 y/o male with PMH of biventricular heart failure, HTN, Afib, CAD who is now s/p EUA wound debridement of gluteal NSTI on 8/2 and diverting loop sigmoid colostomy on 8/3.    Extubated on 8/13, cleared for thick liquids and puree.     -Awaiting step down bed - starting to look for long term placement  -Continue TF @ goal, will discuss PO tolerance with SLP and long term enteral access  -F/u neurology - MRI negative for acute infarct or hemorrhage - follow up further work up, started ASA 8/17  -F/u cardiology - ongoing diuresis, will clarify dosage of daily diuretic and arrythmia management  -F/u ENT - no acute intervention.   -Appreciate Endocrine recs regarding insulin management.  -Encourage ISS and pulmonary toilet. Wean O2 as tolerates.   -PT/OT   -WOCN for new ostomy teaching   -Continue BID packing changes to wound with santyl and dakrudy.       Samy Haddad, DO   General Surgery  Please page [facility] service [phone] with any questions/concerns.

## 2022-08-18 NOTE — PLAN OF CARE
TF recs in note, NPO per VFSS results. TF to meet 100% needs   Problem: Adult Inpatient Plan of Care  Goal: Plan of Care Review  Outcome: Progressing

## 2022-08-18 NOTE — CONSULTS
Physical Medicine and Rehabilitation Consult Note    Subjective     Samy Elena is a 89 y.o. 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 We were asked to see for rehabilitation needs. Patient is 89-year-old gentleman with a past medical history significant for hypertension, atrial fibrillation no anticoagulation, CAD status post CABG, CHF, tricuspid regurgitation and pulmonary hypertension and history of GI bleed.  Hospitalized 8-2-2022 with for near gangrene/necrotizing fasciitis status post surgical debridement and washout 8-2-2022 subsequent laparoscopic sigmoid colostomy and repeat washout 8-3-2022.  Complicated by sepsis and shock requiring pressor support with respiratory failure mechanical ventilation through 8-.  Found to be dysarthric and ataxic with delirium.  Brain MRI negative for acute event    Medical History:   Past Medical History:   Diagnosis Date   • Atrial fibrillation (CMS/HCC)    • Disease of thyroid gland    • GI (gastrointestinal bleed)    • Hypertension    • Myocardial infarction (CMS/HCC)        Surgical History:   Past Surgical History:   Procedure Laterality Date   • BYPASS GRAFT         Social History: , lives alone two-story house.  Social History     Social History Narrative   • Not on file     Lives with:    Prior Function Level: Prior Level of Function  Dominant Hand: right  Eating: independent  Communication: difficulty speaking (not related to language barrier) (per RN, pt's famiyl reports dysarthria PTA)  Swallowing: difficulty swallowing foods  Baseline Diet/Method of Nutritional Intake: no diet restrictions  Past History of Dysphagia: Pt with no hx of dysphagia prior to admission  Prior Level of Function Comment: Mod(i) for BADLs and mobility using RW. Has  for meals and IADLs. Sons assist PRN.  Family History: History reviewed. No pertinent family history.  History also provided by: Patient, EMR, surgical team  Allergies:  Jardiance [empagliflozin]    • aspirin  81 mg oral Daily   • atorvastatin  40 mg oral Daily (6p)   • collagenase  1 application Topical Daily   • heparin (porcine)  5,000 Units subcutaneous q8h IVÁN   • insulin regular  3-5 Units subcutaneous q6h IVÁN   • insulin regular  4 Units subcutaneous q6h IVÁN   • lansoprazole  30 mg feeding tube Daily   • levothyroxine  100 mcg oral Daily (6:30a)   • metoprolol tartrate  25 mg oral BID   • potassium phosphate  20 mmol intravenous Once   • sodium hypochlorite   Topical Daily   • torsemide  10 mg oral Daily        Medication List      ASK your doctor about these medications    acetaminophen 325 mg tablet  Commonly known as: TYLENOL  Take by mouth.     bisacodyL 10 mg suppository  Commonly known as: DULCOLAX  Insert into the rectum.     clopidogreL 75 mg tablet  Commonly known as: PLAVIX  Take 75 mg by mouth daily.  Dose: 75 mg     coenzyme Q10 100 mg capsule  Commonly known as: COQ10  Take 100 mg by mouth daily.  Dose: 100 mg     empagliflozin 10 mg tablet  Commonly known as: JARDIANCE  Take 10 mg by mouth daily.  Dose: 10 mg     ENTRESTO 49-51 mg per tablet  Take 1 tablet by mouth 2 (two) times a day.  Dose: 1 tablet  Generic drug: sacubitriL-valsartan     levothyroxine 100 mcg tablet  Commonly known as: SYNTHROID  Take 100 mcg by mouth daily.  Dose: 100 mcg     metoprolol succinate  mg 24 hr tablet  Commonly known as: TOPROL-XL  Take 100 mg by mouth daily.  Dose: 100 mg     multivitamin tablet  Commonly known as: THERAGRAN  Take by mouth daily.     sildenafiL (pulm.hypertension) 20 mg tablet  Commonly known as: REVATIO  Take 20 mg by mouth 3 (three) times a day.  Dose: 20 mg     simvastatin 10 mg tablet  Commonly known as: ZOCOR  Take 10 mg by mouth nightly.  Dose: 10 mg     spironolactone 25 mg tablet  Commonly known as: ALDACTONE  Take 25 mg by mouth daily.  Dose: 25 mg     torsemide 10 mg tablet  Commonly known as: DEMADEX  Take 10-20 mg by mouth daily. 10 mg if  weight is 176-181 lbs, 20 mg if weight is >181 lbs  Dose: 10-20 mg        REVIEW OF SYSTEMS:    PULMONARY: Negative tobacco no shortness of breath  RHEUMATOLOGIC: History of intermittent low back pain  NEUROLOGIC: Intermittent numbness and tingling in his feet chronically  PSYCHIATRIC: Historically memory preserved  Mobility: Single-point cane    Remainder of eleven point review of systems is unremarkable.      Objective   Labs  Reviewed remarkable for anemia, diminished albumin  Lab Results   Component Value Date    WBC 8.51 08/18/2022    HGB 9.8 (L) 08/18/2022    HCT 29.2 (L) 08/18/2022     (L) 08/18/2022    CHOL 94 07/23/2022    TRIG 41 07/23/2022    HDL 23 (L) 07/23/2022    ALT 33 08/18/2022    AST 87 (H) 08/18/2022     08/18/2022    K 3.6 08/18/2022     08/18/2022    CREATININE 0.7 (L) 08/18/2022    BUN 27 (H) 08/18/2022    CO2 24 08/18/2022    TSH 9.96 (H) 08/14/2022    INR 1.3 08/18/2022    HGBA1C 6.3 (H) 07/23/2022   GFR:>60,  Albumin: 1.3       Imaging  MRI brain 8-, reviewed  Findings:  The ventricles, sulci and cisternal spaces are prominent consistent with  involutional changes.     No abnormal areas of restricted diffusion are identified to suggest acute  infarction. Patchy foci of increased T2 signal are present in the  periventricular and subcortical white matter which are nonspecific but likely  represent mild to moderate chronic microvascular ischemic changes.     No acute intracranial hemorrhage, intra-axial mass-effect or extra-axial fluid  collection is identified.     There is redemonstration of irregularity of the basilar artery which is better  assessed on the prior angiographic examination.     Mucosal thickening is present in the left greater than right ethmoid air cells.  There is mild partial opacification of the right mastoid air cells.        --  IMPRESSION:  No acute infarct, acute intracranial hemorrhage or mass effect.  Chronic microangiopathy and  involutional changes.      TTE 8-3-2022, reviewed    · Normal-sized LV. Mildly decreased LV systolic function. Estimated EF 40- 45%. Markedly abnormal LV septal wall motion. Normal LV wall thickness. Unable to assess diastolic filling pattern of the LV.  · Severely dilated RV. Severely reduced RV systolic function.  · Mildly dilated LA.  · Severely dilated RA. Catheter present in the RA.  · Aorta not well visualized. Aortic root sclerotic.  · Sclerotic aortic valve leaflets. Trace aortic valve regurgitation.  · Sclerotic mitral valve. Trace mitral valve regurgitation.  · Pulmonic valve not well visualized.  · Dilated tricuspid annulus with leaflet failure to coapt. Large gap visualized, approximately 1 cm. Severe tricuspid valve regurgitation. Vena contracta 1.0cm. Unable to estimate RVSP in the setting of severe tricuspid regurgitation and single-chamber physiology. Reversed hepatic vein systolic flow.  · IVC is dilated (>2.1cm). IVC collapses <50% during inspiration.  · No evidence of pericardial effusion.  · No prior study available for comparison.         PHYSICAL EXAM: 200 pounds, BMI 26  Vitals:    08/18/22 1034   BP: (!) 116/56   Pulse: 94   Resp: 18   Temp: 36.3 °C (97.3 °F)   SpO2: 95%   Atrial rhythm, room air      General: Well-developed, frail cachectic, elderly gentleman no acute distress  HEENT: Normocephalic/atraumatic  Lungs: Respirations unlabored  Cardiac: Irregular rhythm no JVD +2 pedal edema  Abdomen: Soft functioning colostomy.  Extremities, mild atrophy of the distal quadriceps bilaterally.  No increased tone.    Psychiatric: Affect is pleasant, he is cooperative  Neurologic: Generally oriented although easily confused.  Cranial nerves are symmetrical, speech is hypophonic, fluent and he follows commands consistently.  Mild decreased hearing.  Sensation is altered in stocking distribution, no extinction on double simultaneous stimulation.  Deep tendon reflexes are symmetrical hyporeflexic.   Motor examination proximal upper extremities 4-/5, distal upper extremities 4+/5, proximal lower extremities 2+/5, distal lower extremities 4+/5  Mobility: Moderate assistance bed mobility, maximal assistance of 2 sit to stand transfer.    ASSESSMENT/PLAN:    [place] gangrene, status post IV in the/debridement 8-2-2022 and subsequent washout and diverting loop sigmoid colostomy 8-3-2022.    VDRF, status postextubation 8-, stable O2 saturation on room air, with dysphagia on a  modified diet of purée/thick liquids, Dobbhoff tube in place.    Ischemic cardiomyopathy, decreased left ventricular function with EF 40 to 45% with severe RV dilatation and diastolic dysfunction.  Status post septic shock, now clinically compensated.    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  Anticipate that this will improve steadily.    Encephalopathy, multifactorial, MRI of the brain negative for acute event.  Improving steadily.    Rehabilitation/disposition recommend skilled rehabilitation for individualized physical therapy, Occupational Therapy, nursing care and education.      Plan of care was discussed with patient and team

## 2022-08-18 NOTE — PROGRESS NOTES
"Brief Nutrition Note    Recommendations   1. NPO/ TF- change to Isosource 1.5 at 50 ml/hr x22 hr, 1 pack prosource. Maintenance water flush 40 ml/hr  ++hold TF one hour pre and post synthroid admin    2. BG goal 140 - 180 mg/dL  - Regular insulin SSI Q6H with TF    Clinical Course: Patient is a 89 y.o. 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     Past Medical History:   Diagnosis Date   • Atrial fibrillation (CMS/HCC)    • Disease of thyroid gland    • GI (gastrointestinal bleed)    • Hypertension    • Myocardial infarction (CMS/HCC)      Past Surgical History:   Procedure Laterality Date   • BYPASS GRAFT         Reason for Assessment  Reason For Assessment: identified at risk by screening criteria ([facility])  Patient Already Seen On: 08/15/22     [facility] Nutrition Screen Tool  Has patient lost weight without trying?: 2-->Unsure  If yes,how much weight has been lost?: 2-->Unsure  Has patient been eating poorly due to decreased appetite?: 0-->No  [facility] Nutrition Screen Score: 4     Nutrition/Diet History  Intake (%): 25%    Physical Findings  Overall Physical Appearance: on oxygen therapy (off vent)  Gastrointestinal: colostomy (-250ml)  Last Bowel Movement: 08/17/22  Tubes: Nasogastric tube  Skin: surgical incision     Nutrition Order  Nutrition Order: meets nutritional requirements  Nutrition Order Comments: peptamen@65ml, 110 ml flush, NPO  Current TF/TPN Regimen: yes     Anthropometrics  Height: 185.4 cm (6' 1\")       Current Weight  Weight Method: Bed scale  Weight: 90.8 kg (200 lb 2.8 oz)     Ideal Body Weight (IBW)  Ideal Body Weight (IBW) (kg): 84.86  % Ideal Body Weight: 94.61       Body Mass Index (BMI)  BMI (Calculated): 26.4     Labs/Procedures/Meds  Lab Results Reviewed: reviewed, pertinent   BMP Results       08/18/22 08/17/22 08/17/22     0524 2133 0322     138 142    K 3.6 3.7 3.5    Cl 108 109 111    CO2 24 24 25    Glucose 166 192 221    BUN 27 27 " 28    Creatinine 0.7 0.7 0.8    Calcium 7.6 7.7 8.0    Anion Gap 5 5 6    EGFR >60.0 >60.0 >60.0        Results from last 7 days   Lab Units 08/18/22  0524 08/17/22  0322 08/16/22  0552   MAGNESIUM mg/dL 1.9 1.8 1.9     Lab Results   Component Value Date    CALCIUM 7.6 (L) 08/18/2022    PHOS 2.0 (L) 08/18/2022     BGs 134-192 mg/dL        Medications  Pertinent Medications Reviewed: reviewed, pertinent   • aspirin  81 mg oral Daily   • atorvastatin  40 mg oral Daily (6p)   • collagenase  1 application Topical Daily   • heparin (porcine)  5,000 Units subcutaneous q8h IVÁN   • insulin regular  3-5 Units subcutaneous q6h IVÁN   • insulin regular  4 Units subcutaneous q6h IVÁN   • lansoprazole  30 mg feeding tube Daily   • levothyroxine  100 mcg oral Daily (6:30a)   • metoprolol tartrate  25 mg oral BID   • potassium chloride  20 mEq intravenous Once   • potassium phosphate  20 mmol intravenous Once   • sodium hypochlorite   Topical Daily   • torsemide  10 mg oral Daily     Tube Feeding Assessment  Active Tube Feed Order: Yes  Delivery Method: Continuous per pump  TF Protein Grams: 119 grams  TF Total kCals: 1872 kcals  Projected Tube Feed Volume: 1560  TF Free Water: 1270    Dosing wt 80 kg admit wt    Clinical comments: Patient seen for f/u. VFSS 8/16 - NPO per SLP with alternative source of nutrition; team to talk about goals of care; at this time, TF were restarted. Can change to more appropriate standard formulary. Volume overloaded 8/15 w/ IV diuresis; 24h UOP 1395ml.    Current TF order: Peptamen AF @65 ml, water flushes at 110 ml/h = 1872 kcal (23 kcal/kg ABW), 119 g/protein/day (1.5 g/kg ABW), 3900 ml H2O (48 ml/kg ABW).    Change TF to: Isosource 1.5 at 50 ml/hr x22 hr, 1 pack prosource = 1875 kcal (23 kcal/kg ABW), 97 g protein (1.2 g/kg ABW), 924 ml water in TF. Maintenance water flush 40  ml/hr =  total 1884  ml (24 ml/kg ABW)    Goals: TF to meet 100% of needs  Monitor:  TF, labs, plan of care, GI  function    Recommendations: See above       Date: 08/18/22  Signature: Nancy Summers RD

## 2022-08-18 NOTE — PROGRESS NOTES
Endocrinology Progress Note       SUBJECTIVE   Interval History:    Patient was seen in the morning.  Is alert and awake x 3.  Tolerating tube feeding. Noticed TF formula change.   NPO now.        OBJECTIVE      Vital signs in last 24 hours:  Temp:  [36 °C (96.8 °F)-37.5 °C (99.5 °F)] 36 °C (96.8 °F)  Heart Rate:  [87-98] 91  Resp:  [18-20] 18  BP: ()/(50-76) 95/58      PHYSICAL EXAMINATION        Constitutional: well-developed. well- nourished. Appears lethargic  Eyes: Conjunctivae and EOM are normal. PERRL  Neck: Normal range of motion. Neck supple.    Neurological: Alert and awake      LABS / IMAGING / TELE      Labs    Lab Results   Component Value Date    GLUCOSE 166 (H) 08/18/2022    CALCIUM 7.6 (L) 08/18/2022     08/18/2022    K 3.6 08/18/2022    CO2 24 08/18/2022     08/18/2022    BUN 27 (H) 08/18/2022    CREATININE 0.7 (L) 08/18/2022     Lab Results   Component Value Date    HGBA1C 6.3 (H) 07/23/2022     Lab Results   Component Value Date    CHOL 94 07/23/2022     Lab Results   Component Value Date    HDL 23 (L) 07/23/2022     Lab Results   Component Value Date    LDLCALC 63 07/23/2022     Lab Results   Component Value Date    TRIG 41 07/23/2022     No results found for: CHOLHDL  Lab Results   Component Value Date    ALT 33 08/18/2022    AST 87 (H) 08/18/2022    ALKPHOS 123 08/18/2022    BILITOT 0.9 08/18/2022      Latest Reference Range & Units Most Recent   TSH 0.34 - 5.60 mIU/L 9.96 (H)  08/14/22 05:44   Free T4 0.58 - 1.64 ng/dL 0.25 (L)  08/14/22 05:44   (H): Data is abnormally high  (L): Data is abnormally low      ASSESSMENT AND PLAN      1 History of preDM  - evidenced with A1c 6.3. on Jardiance 10 mg PTA    - was on oral for 1-2 days. NPO now.   - changed TF Peptamen AF 65 ml/hr to isosource 1.5 at 50 ml/hr.      - RI 4 units q 6 for TF  - added RI ss q 6h if needed.      2 necrotizing fasciitis  This occurred in the context of SGLT2 inhibitor use for heart failure  Jardiance  has been discontinued.  He should not receive any further treatment with medications in this class going forward.    3 Hypothyroidism  - Was on LT4 100 mcg daily PTA.    - fabrizio TSH due to missing LT4.   - LT4 was restarted 8/15.   - Repeat TFT in 1 week to ensure good absorption.     4, History of CHF - mgt per cardiology service    Thank you very much for allowing me participating in this patient's care. Please feel free to contact me if any questions.          Madonna Dent MD

## 2022-08-18 NOTE — PROGRESS NOTES
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 Strict NPO, medication and nutrition via DHT  2. Strict aspiration precautions including frequent and stringent oral care with suction PRN, HOB > 30 degrees  3. Palliative care is currently following patient, consider GOC discussion to outline GOC (feeding at known risk vs consideration for longer-term alt source for nutrition).  4.  SLP for ongoing dysphagia follow-up.     Summary/Handoff:  Daily Outcome Statement: Dysphagia follow-up completed at the bedside. Pt'sbrain MRI clear. SLP reviewed results of VFSS, continued training in swallow exercises. After oral care, trials of thin liquids via teaspono with training in breath hold and swallow-pt with difficulty sequencing. No overt s/sx of aspiration, unable to rule-out silent aspiration at the bedside. Recommend pt remain NPO, DHT for nutrition and medication. Strict aspiration precautions including frequent and stringent oral care with suction PRN. SLP for ongoing follow-up, likely repet VFSS prior to initiation of diet. consider palliative care follow-up to determine pt's GOC      Session Notes:     GRBAS: GRBAS Scoring unchanged from previous session.         Dietary Orders   (From admission, onward)             Start     Ordered    08/19/22 0001  NPO Diet  Diet effective midnight        Comments: For OR 8/19 - please hold Tube Feeds at midnight.    08/18/22 1055    08/18/22 1121  Diet message  Once        Comments: Please send isosource 1.5 please and thank you    08/18/22 1121    08/18/22 1114  Tube Feed with NPO Isosource 1.5; Continuous; 50; 50; hold TF 1hr pre and 1hr post synthroid administration;  "Water; 40; Every 1 hour; one; RD/LDN may adjust order; AM Snack  (Tube Feed with NPO Diet Orders with insertion and maintenance panels)  Diet effective now        Comments: 1 pack prosource   Question Answer Comment   Tube Feeding Formula (LMC): Isosource 1.5    Administration Type Continuous    Tube Feeding Start rate (mL/hr): 50    Tube Feeding Goal rate (mL/hr): 50    Nursing Instruction hold TF 1hr pre and 1hr post synthroid administration    Flush type: Water    Flush amount (mL): 40    Flush frequency: Every 1 hour    Protein Supplement (packet): one    Delegation of Authority. Diet orders written by PA/CRNPs may not be adjusted by RD/LDNs. RD/LDN may adjust order    Meal period (AM Snack required for CBORD, do not remove: Not clinically relevant) AM Snack       \"And\" Linked Group Details    08/18/22 1118                Results from last 7 days   Lab Units 08/18/22  0524 08/17/22  0322 08/16/22  0552   WBC K/uL 8.51 9.03 8.41   HEMOGLOBIN g/dL 9.8* 10.6* 10.5*   HEMATOCRIT % 29.2* 31.5* 32.2*   PLATELETS K/uL 113* 98* 99*          RN cleared SLP to assess/work with patient as no medical issues identified to preclude this therapy session. Following completion of session, pt remained in bed as they were found with call bell in reach.  Nurse was made aware of patients performance and any changes in status (if applicable).      Samy is a 89 y.o. 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    Past Medical History  Samy has a past medical history of Atrial fibrillation (CMS/HCC), Disease of thyroid gland, GI (gastrointestinal bleed), Hypertension, and Myocardial infarction (CMS/HCC).    History of Present Illness   Admit from OP MD's office 8/2 for denise's gangrene. s/p EUA wound debridement of gluteal NSTI on 8/2 and diverting loop sigmoid colostomy on 8/3  He was then admitted to the surgical ICU where he was intubated and had " transient pressor requirements. ETT 8/3-8/12, extubated 8/13. Cleared for thick liquids and puree.    C/f stroke 8/17, MRI negative for acute infarct or hemorrhage      SLP Vitals    Date/Time Observations [facility]   08/18/22 0910 Report given to YULIANA COELLO      SLP Pain    Date/Time Pain Type Rating: Rest Rating: Activity [facility]   08/18/22 0904 Pain Assessment 0 - no pain 0 - no pain KAYLENE          Prior Living Environment    Flowsheet Row Most Recent Value   Living Environment Comment Lives alone in 2 SH with AK on first floor and bed/bath upstairs. Tub shower. Sons installed stair glide last week.        Prior Level of Function    Flowsheet Row Most Recent Value   Dominant Hand right   Eating independent   Communication difficulty speaking (not related to language barrier)  [per RN, pt's kaideniyl reports dysarthria PTA]   Swallowing difficulty swallowing foods   Baseline Diet/Method of Nutritional Intake no diet restrictions   Past History of Dysphagia Pt with no hx of dysphagia prior to admission   Prior Level of Function Comment Mod(i) for BADLs and mobility using RW. Has  for meals and IADLs. Rosita assist PRN.   Assistive Device Currently Used at Home cane, straight, walker, front-wheeled           SLP Evaluation and Treatment - 08/18/22 0904        SLP Time Calculation    Start Time 0904     Stop Time 0914     Time Calculation (min) 10 min        Session Details    Document Type daily treatment/progress note     Mode of Treatment speech language pathology;individual therapy        General Information    Patient Profile Reviewed yes     General Observations of Patient Pt received in bed, pleasantly confused     Existing Precautions/Restrictions aspiration;fall;NPO   +DHT    Limitations/Impairments safety/cognitive;swallowing;hearing        Cognition/Psychosocial    Affect/Mental Status (Cognition) confused     Orientation Status (Cognition) oriented to;person;place;verbal cues/prompts needed for  orientation;situation;time     Comment, Cognition Pt appears more confused this day requiring re-orientation, increased difficulty following swallow sequence. Of note, brain MRI-clear        Vocal Quality/Secretion Management (Oral Motor)    Vocal Quality (Oral Motor) hypophonic;dysphonic;hoarse     Comment, Vocal Quality/Secretion Management (Oral Motor) ETT-see ENT note        Motor Speech    Comment, Motor Speech Intervention dysarthria persists        Auditory Comprehension    Comment, Intervention (Auditory Comprehension) pt with difficulty following commands for swallwo exercisesand sequence        Verbal Expression    Comment, Intervention (Verbal Expression) Pt is pleasantly confused, he is able to make simple requests, however, d/t c/f confusion-RN should continue to anticpate the pt's wants and needs        Functional Communication Measures    FCM: Swallowing 2-->Level 2        General Swallowing Observations    Current Diet/Method of Nutritional Intake NPO;nasogastric tube (NG)     Signs/Symptoms of Aspiration (Current Diet) concerns for silent aspiration     Respiratory Support (General Swallowing Observations) none     Comment, General Swallowing Observations Brain MRI-clear, Ongoing training in breath hold, swallow exercises. Oral care completed prior to training w/ breath hold        Food and Liquid Trials (NIS)    Patient Positioning HOB elevated (specify degrees)     Oral Intake/Feeding Performance oral trials administered by therapist     Liquid Consistencies Evaluated thin liquids     Thin Liquids use of teaspoon     Comment, Thin Liquids pt without consistent use of breath hold sequence. No overts s/sx of aspiration, cannot rule-out silent aspiration at the bedside     Oral Preparatory Phase of Swallow moderate impairment;severe impairment;mouth closure around utensil/cup decreased;lip seal impaired;suspect posterior bolus leak     Oral Phase of Swallow moderate impairment;severe  impairment;tongue movement and elevation reduced;delayed anterior-posterior transit;suspect posterior bolus leak     Pharyngeal Phase of Swallow delayed swallow reflex initiation;uncoordinated swallow     Comment, Pharyngeal Phase cannot rule-out silent aspiration at the bedside     Esophageal Phase of Swallow no clinical symptoms     Comment ongoing swallow intervention at bedside with repeat VFSS when appropriate        Swallowing Recommendations    Diet Consistency Recommendations NPO     Medication Administration non-oral route     Instrumental Assessment Recommendations reassess via non-instrumental clinical swallow evaluation     Recommend VFSS (Comment) repeat VFSS when appropriate     Comment, Swallowing Recommendations Strict aspiration precautions including frequent and stringent oral care with suction PRN        Swallowing Intervention    Dysphagia/Swallowing Interventions monitor tolerance of;advanced diet/liquid texture trials        AM-PAC (TM) - Cognition (Current Function)    Following/understanding a 10-15 minute speech or presentation? 2 - A lot     Understanding familiar people during ordinary conversations? 2 - A lot     Remembering to take medications at the appropriate time? 1 - Unable     Remembering where things were placed or put away? 1 - Unable     Remembering a list of 3 or 4 errands without writing it down? 1 - Unable     Taking care of complicated tasks? 1 - Unable     AM-PAC (TM) Cognition Score 8        Assessment/Plan (SLP)    Daily Outcome Statement Dysphagia follow-up completed at the bedside. Pt'sbrain MRI clear. SLP reviewed results of VFSS, continued training in swallow exercises. After oral care, trials of thin liquids via teaspono with training in breath hold and swallow-pt with difficulty sequencing. No overt s/sx of aspiration, unable to rule-out silent aspiration at the bedside. Recommend pt remain NPO, DHT for nutrition and medication. Strict aspiration precautions  including frequent and stringent oral care with suction PRN. SLP for ongoing follow-up, likely repet VFSS prior to initiation of diet. 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, dysarthria-unknown etiology, cog-com deficits, dysphonia s/p prolonged intubation at 08/18/2022 0904   Patient/Family Therapy Goal Statement none stated this day at 08/18/2022 0904                    SLP Goals    Flowsheet Row Most Recent Value   Oral Nutrition/Hydration Goal 1    Activity effective/safe/independent, oral nutrition/hydration, use of swallowing techniques, management of texture/viscosity at 08/13/2022 1026   Time Frame long-term goal (LTG), by discharge at 08/13/2022 1026   Strategies/Barriers ETT x9 days, high oxygen demands at 08/13/2022 1026   Progress/Outcome progress slower than expected at 08/18/2022 0904

## 2022-08-18 NOTE — PATIENT CARE CONFERENCE
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

## 2022-08-19 LAB
ALBUMIN SERPL-MCNC: 1.5 G/DL (ref 3.4–5)
ALP SERPL-CCNC: 179 IU/L (ref 35–126)
ALT SERPL-CCNC: 43 IU/L (ref 16–63)
ANION GAP SERPL CALC-SCNC: 2 MEQ/L (ref 3–15)
AST SERPL-CCNC: 121 IU/L (ref 15–41)
BILIRUB SERPL-MCNC: 1.1 MG/DL (ref 0.3–1.2)
BUN SERPL-MCNC: 26 MG/DL (ref 8–20)
CA-I BLD-SCNC: 1.1 MMOL/L (ref 1.15–1.27)
CALCIUM SERPL-MCNC: 7.3 MG/DL (ref 8.9–10.3)
CHLORIDE SERPL-SCNC: 108 MEQ/L (ref 98–109)
CO2 SERPL-SCNC: 24 MEQ/L (ref 22–32)
CREAT SERPL-MCNC: 0.8 MG/DL (ref 0.8–1.3)
ERYTHROCYTE [DISTWIDTH] IN BLOOD BY AUTOMATED COUNT: 16.4 % (ref 11.6–14.4)
GFR SERPL CREATININE-BSD FRML MDRD: >60 ML/MIN/1.73M*2
GLUCOSE BLD-MCNC: 123 MG/DL (ref 70–99)
GLUCOSE BLD-MCNC: 139 MG/DL (ref 70–99)
GLUCOSE BLD-MCNC: 156 MG/DL (ref 70–99)
GLUCOSE BLD-MCNC: 180 MG/DL (ref 70–99)
GLUCOSE SERPL-MCNC: 168 MG/DL (ref 70–99)
HCT VFR BLDCO AUTO: 31.9 % (ref 40.1–51)
HGB BLD-MCNC: 10.7 G/DL (ref 13.7–17.5)
INR PPP: 1.2
MAGNESIUM SERPL-MCNC: 2 MG/DL (ref 1.8–2.5)
MCH RBC QN AUTO: 34.9 PG (ref 28–33.2)
MCHC RBC AUTO-ENTMCNC: 33.5 G/DL (ref 32.2–36.5)
MCV RBC AUTO: 103.9 FL (ref 83–98)
PDW BLD AUTO: 10.7 FL (ref 9.4–12.4)
PHOSPHATE SERPL-MCNC: 2.8 MG/DL (ref 2.4–4.7)
PLATELET # BLD AUTO: 159 K/UL (ref 150–350)
POCT TEST: ABNORMAL
POTASSIUM SERPL-SCNC: 3.8 MEQ/L (ref 3.6–5.1)
PROT SERPL-MCNC: 5.4 G/DL (ref 6–8.2)
PROTHROMBIN TIME: 15.2 SEC (ref 12.2–14.5)
RBC # BLD AUTO: 3.07 M/UL (ref 4.5–5.8)
SODIUM SERPL-SCNC: 134 MEQ/L (ref 136–144)
WBC # BLD AUTO: 8.46 K/UL (ref 3.8–10.5)

## 2022-08-19 PROCEDURE — 83735 ASSAY OF MAGNESIUM: CPT | Performed by: STUDENT IN AN ORGANIZED HEALTH CARE EDUCATION/TRAINING PROGRAM

## 2022-08-19 PROCEDURE — 92526 ORAL FUNCTION THERAPY: CPT | Mod: GN

## 2022-08-19 PROCEDURE — 97530 THERAPEUTIC ACTIVITIES: CPT | Mod: GP

## 2022-08-19 PROCEDURE — 85027 COMPLETE CBC AUTOMATED: CPT | Performed by: STUDENT IN AN ORGANIZED HEALTH CARE EDUCATION/TRAINING PROGRAM

## 2022-08-19 PROCEDURE — 63700000 HC SELF-ADMINISTRABLE DRUG: Performed by: INTERNAL MEDICINE

## 2022-08-19 PROCEDURE — 82330 ASSAY OF CALCIUM: CPT | Performed by: SURGERY

## 2022-08-19 PROCEDURE — 80053 COMPREHEN METABOLIC PANEL: CPT | Performed by: STUDENT IN AN ORGANIZED HEALTH CARE EDUCATION/TRAINING PROGRAM

## 2022-08-19 PROCEDURE — 99233 SBSQ HOSP IP/OBS HIGH 50: CPT | Performed by: INTERNAL MEDICINE

## 2022-08-19 PROCEDURE — 97535 SELF CARE MNGMENT TRAINING: CPT | Mod: GO

## 2022-08-19 PROCEDURE — 99024 POSTOP FOLLOW-UP VISIT: CPT | Performed by: COLON & RECTAL SURGERY

## 2022-08-19 PROCEDURE — 63600000 HC DRUGS/DETAIL CODE: Performed by: SURGERY

## 2022-08-19 PROCEDURE — 63700000 HC SELF-ADMINISTRABLE DRUG

## 2022-08-19 PROCEDURE — 85610 PROTHROMBIN TIME: CPT | Performed by: STUDENT IN AN ORGANIZED HEALTH CARE EDUCATION/TRAINING PROGRAM

## 2022-08-19 PROCEDURE — 63700000 HC SELF-ADMINISTRABLE DRUG: Performed by: PHYSICIAN ASSISTANT

## 2022-08-19 PROCEDURE — 84100 ASSAY OF PHOSPHORUS: CPT | Performed by: STUDENT IN AN ORGANIZED HEALTH CARE EDUCATION/TRAINING PROGRAM

## 2022-08-19 PROCEDURE — 21400000 HC ROOM AND CARE CCU/INTERMEDIATE

## 2022-08-19 PROCEDURE — 63700000 HC SELF-ADMINISTRABLE DRUG: Performed by: SURGERY

## 2022-08-19 PROCEDURE — 99232 SBSQ HOSP IP/OBS MODERATE 35: CPT | Performed by: INTERNAL MEDICINE

## 2022-08-19 PROCEDURE — 97168 OT RE-EVAL EST PLAN CARE: CPT | Mod: GO

## 2022-08-19 PROCEDURE — 36415 COLL VENOUS BLD VENIPUNCTURE: CPT | Performed by: STUDENT IN AN ORGANIZED HEALTH CARE EDUCATION/TRAINING PROGRAM

## 2022-08-19 PROCEDURE — 97164 PT RE-EVAL EST PLAN CARE: CPT | Mod: GP

## 2022-08-19 RX ORDER — NAPROXEN SODIUM 220 MG/1
81 TABLET, FILM COATED ORAL DAILY
Qty: 30 TABLET | Refills: 0 | Status: SHIPPED | OUTPATIENT
Start: 2022-08-20 | End: 2022-12-19

## 2022-08-19 RX ORDER — LEVOTHYROXINE SODIUM 100 UG/1
100 TABLET ORAL
Status: DISCONTINUED | OUTPATIENT
Start: 2022-08-20 | End: 2022-08-23 | Stop reason: HOSPADM

## 2022-08-19 RX ORDER — TORSEMIDE 20 MG/1
10 TABLET ORAL DAILY
Status: DISCONTINUED | OUTPATIENT
Start: 2022-08-20 | End: 2022-08-23 | Stop reason: HOSPADM

## 2022-08-19 RX ORDER — CLOPIDOGREL BISULFATE 75 MG/1
75 TABLET ORAL DAILY
Qty: 30 TABLET | Refills: 0 | Status: SHIPPED | OUTPATIENT
Start: 2022-08-19 | End: 2023-11-02 | Stop reason: SDUPTHER

## 2022-08-19 RX ORDER — NAPROXEN SODIUM 220 MG/1
81 TABLET, FILM COATED ORAL DAILY
Qty: 30 TABLET | Refills: 0 | Status: SHIPPED | OUTPATIENT
Start: 2022-08-20 | End: 2022-08-19 | Stop reason: HOSPADM

## 2022-08-19 RX ORDER — ATORVASTATIN CALCIUM 40 MG/1
40 TABLET, FILM COATED ORAL
Qty: 30 TABLET | Refills: 0 | Status: SHIPPED | OUTPATIENT
Start: 2022-08-20 | End: 2022-12-19

## 2022-08-19 RX ORDER — LEVOTHYROXINE SODIUM 100 UG/1
100 TABLET ORAL
Qty: 30 TABLET | Refills: 0 | Status: SHIPPED | OUTPATIENT
Start: 2022-08-20 | End: 2023-11-02 | Stop reason: SDUPTHER

## 2022-08-19 RX ORDER — POTASSIUM CHLORIDE 14.9 MG/ML
20 INJECTION INTRAVENOUS ONCE
Status: DISCONTINUED | OUTPATIENT
Start: 2022-08-19 | End: 2022-08-19

## 2022-08-19 RX ORDER — ATORVASTATIN CALCIUM 40 MG/1
40 TABLET, FILM COATED ORAL
Qty: 90 UNSPECIFIED | Refills: 0 | Status: SHIPPED | OUTPATIENT
Start: 2022-08-19 | End: 2022-08-19 | Stop reason: HOSPADM

## 2022-08-19 RX ORDER — ATORVASTATIN CALCIUM 40 MG/1
40 TABLET, FILM COATED ORAL
Status: DISCONTINUED | OUTPATIENT
Start: 2022-08-20 | End: 2022-08-23 | Stop reason: HOSPADM

## 2022-08-19 RX ORDER — METOPROLOL TARTRATE 25 MG/1
25 TABLET, FILM COATED ORAL 2 TIMES DAILY
Status: DISCONTINUED | OUTPATIENT
Start: 2022-08-19 | End: 2022-08-23 | Stop reason: HOSPADM

## 2022-08-19 RX ORDER — TORSEMIDE 10 MG/1
10-20 TABLET ORAL DAILY
Qty: 60 TABLET | Refills: 0 | Status: SHIPPED | OUTPATIENT
Start: 2022-08-19 | End: 2023-11-02 | Stop reason: SDUPTHER

## 2022-08-19 RX ORDER — NAPROXEN SODIUM 220 MG/1
81 TABLET, FILM COATED ORAL DAILY
Status: DISCONTINUED | OUTPATIENT
Start: 2022-08-20 | End: 2022-08-23 | Stop reason: HOSPADM

## 2022-08-19 RX ORDER — METOPROLOL TARTRATE 25 MG/1
25 TABLET, FILM COATED ORAL 2 TIMES DAILY
Qty: 60 TABLET | Refills: 0 | Status: SHIPPED | OUTPATIENT
Start: 2022-08-19 | End: 2023-11-02 | Stop reason: ALTCHOICE

## 2022-08-19 RX ORDER — CLOPIDOGREL BISULFATE 75 MG/1
75 TABLET ORAL DAILY
Status: DISCONTINUED | OUTPATIENT
Start: 2022-08-19 | End: 2022-08-23 | Stop reason: HOSPADM

## 2022-08-19 RX ORDER — ACETAMINOPHEN 650 MG/20.3ML
650 LIQUID ORAL EVERY 4 HOURS PRN
Qty: 200 ML | Refills: 0 | Status: SHIPPED | OUTPATIENT
Start: 2022-08-19 | End: 2023-11-02

## 2022-08-19 RX ADMIN — INSULIN HUMAN 4 UNITS: 100 INJECTION, SOLUTION PARENTERAL at 05:33

## 2022-08-19 RX ADMIN — HEPARIN SODIUM 5000 UNITS: 5000 INJECTION, SOLUTION INTRAVENOUS; SUBCUTANEOUS at 22:14

## 2022-08-19 RX ADMIN — TORSEMIDE 10 MG: 20 TABLET ORAL at 08:37

## 2022-08-19 RX ADMIN — DAKIN'S SOLUTION 0.125% (QUARTER STRENGTH): 0.12 SOLUTION at 08:42

## 2022-08-19 RX ADMIN — LEVOTHYROXINE SODIUM 100 MCG: 0.1 TABLET ORAL at 06:58

## 2022-08-19 RX ADMIN — HEPARIN SODIUM 5000 UNITS: 5000 INJECTION, SOLUTION INTRAVENOUS; SUBCUTANEOUS at 13:18

## 2022-08-19 RX ADMIN — COLLAGENASE SANTYL 1 APPLICATION.: 250 OINTMENT TOPICAL at 08:42

## 2022-08-19 RX ADMIN — INSULIN HUMAN 4 UNITS: 100 INJECTION, SOLUTION PARENTERAL at 00:48

## 2022-08-19 RX ADMIN — CLOPIDOGREL BISULFATE 75 MG: 75 TABLET ORAL at 19:01

## 2022-08-19 RX ADMIN — LANSOPRAZOLE 30 MG: KIT at 08:37

## 2022-08-19 RX ADMIN — INSULIN HUMAN 4 UNITS: 100 INJECTION, SOLUTION PARENTERAL at 13:17

## 2022-08-19 RX ADMIN — HEPARIN SODIUM 5000 UNITS: 5000 INJECTION, SOLUTION INTRAVENOUS; SUBCUTANEOUS at 05:32

## 2022-08-19 RX ADMIN — INSULIN HUMAN 4 UNITS: 100 INJECTION, SOLUTION PARENTERAL at 17:38

## 2022-08-19 RX ADMIN — ASPIRIN 81 MG: 81 TABLET, CHEWABLE ORAL at 08:37

## 2022-08-19 ASSESSMENT — COGNITIVE AND FUNCTIONAL STATUS - GENERAL
HELP NEEDED FOR PERSONAL GROOMING: 3 - A LITTLE
TAKING CARE OF COMPLICATED TASKS: 1 - UNABLE
AFFECT: LOW AROUSAL/LETHARGIC
EATING MEALS: 1 - TOTAL
REMEMBERING TO TAKE MEDICATION: 1 - UNABLE
STANDING UP FROM CHAIR USING ARMS: 2 - A LOT
AFFECT: LOW AROUSAL/LETHARGIC
FOLLOWS FAMILIAR CONVERSATION: 3 - A LITTLE
DRESSING REGULAR UPPER BODY CLOTHING: 2 - A LOT
AFFECT: LOW AROUSAL/LETHARGIC
HELP NEEDED FOR BATHING: 2 - A LOT
REMEMBERING 5 ERRANDS WITH NO LIST: 2 - A LOT
CLIMB 3 TO 5 STEPS WITH RAILING: 1 - TOTAL
MOVING TO AND FROM BED TO CHAIR: 2 - A LOT
REMEMBERING WHERE THINGS ARE: 2 - A LOT
WALKING IN HOSPITAL ROOM: 1 - TOTAL
DRESSING REGULAR LOWER BODY CLOTHING: 1 - TOTAL
UNDERSTANDING 10 TO 15 MIN SPEECH: 3 - A LITTLE
TOILETING: 1 - TOTAL

## 2022-08-19 NOTE — PROGRESS NOTES
Patient:  Samy Elena  Location:  [facility] [address] [address]  MRN:  [id number]  Today's date:  8/19/2022  Speech Pathology: Therapy session    SLP Diagnosis: Per video swallow moderate to severe oral stage and severe pharyngeal stage dysphagia, dysphonia, dysarthria, now COVID-positive    Recommendations:  1. Strict NPO, medication and nutrition via DHT  2. Strict aspiration precautions including frequent and stringent oral care with suction PRN, HOB > 30 degrees  3. FOR COMFORT, with RN supervision, single ice chip (1-3/hour) AFTER ORAL CARE  4. Palliative care is currently following patient, consider GOC discussion to outline GOC (feeding at known risk vs consideration for longer-term alt source for nutrition).  5.  SLP for ongoing dysphagia follow-up. Consider repeat VFSS early next week if appropriate      Summary/Handoff:  Daily Outcome Statement: Dysphagia follow-up completed at the bedside.  Patient now COVID-positive, requiring droplet and contact precautions.  Patient awake and alert, oriented x4.  Patient with junky coughing at baseline.  SLP reviewed swallow exercises, patient completed 4 exercises x10 trials with SLP cueing.  Patient continues with reduced carryover of independent completion of swallow exercises.  Following oral care, completion of single ice chips for comfort without signs or symptoms of aspiration.  Trials of thin ice water completed with goal of sequencing with breath-hold.  Patient with no overt signs or symptoms of aspiration vocal quality remained clear.  Patient with known history of silent aspiration, cannot rule out silent aspiration at the bedside.  Recommend patient remain strict n.p.o., medication and nutrition via DHT.  For comfort, single ice chips (1 to 3/h) after oral care, with RN supervision.  Speech therapy for ongoing follow-up, considering repeat video swallow if patient remains in acute hospital early next week.  If patient is discharged from acute  "hospital, would recommend repeat imaging prior to initiation of p.o. diet due to silent aspiration with all consistencies      Session Notes:     GRBAS: GRBAS Scoring unchanged from previous session.         Dietary Orders   (From admission, onward)             Start     Ordered    08/18/22 1121  Diet message  Once        Comments: Please send isosource 1.5 please and thank you    08/18/22 1121    08/18/22 1114  Tube Feed with NPO Isosource 1.5; Continuous; 50; 50; hold TF 1hr pre and 1hr post synthroid administration; Water; 40; Every 1 hour; one; RD/LDN may adjust order; AM Snack  (Tube Feed with NPO Diet Orders with insertion and maintenance panels)  Diet effective now        Comments: 1 pack prosource   Question Answer Comment   Tube Feeding Formula (L[facility]): Isosource 1.5    Administration Type Continuous    Tube Feeding Start rate (mL/hr): 50    Tube Feeding Goal rate (mL/hr): 50    Nursing Instruction hold TF 1hr pre and 1hr post synthroid administration    Flush type: Water    Flush amount (mL): 40    Flush frequency: Every 1 hour    Protein Supplement (packet): one    Delegation of Authority. Diet orders written by PA/CRRonald may not be adjusted by RD/LDNs. RD/LDN may adjust order    Meal period (AM Snack required for CBORD, do not remove: Not clinically relevant) AM Snack       \"And\" Linked Group Details    08/18/22 1118                Results from last 7 days   Lab Units 08/18/22  0524 08/17/22  0322 08/16/22  0552   WBC K/uL 8.51 9.03 8.41   HEMOGLOBIN g/dL 9.8* 10.6* 10.5*   HEMATOCRIT % 29.2* 31.5* 32.2*   PLATELETS K/uL 113* 98* 99*          RN cleared SLP to assess/work with patient as no medical issues identified to preclude this therapy session. Following completion of session, pt remained in bed as they were found with call bell in reach.  Nurse was made aware of patients performance and any changes in status (if applicable).      Samy is a 89 y.o. male admitted on 8/2/2022 with Necrotizing soft " tissue infection [M79.89]. 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    Past Medical History  Samy has a past medical history of Atrial fibrillation (CMS/HCC), Disease of thyroid gland, GI (gastrointestinal bleed), Hypertension, and Myocardial infarction (CMS/HCC).    History of Present Illness   Admit from OP MD's office 8/2 for denise's gangrene. s/p EUA wound debridement of gluteal NSTI on 8/2 and diverting loop sigmoid colostomy on 8/3  He was then admitted to the surgical ICU where he was intubated and had transient pressor requirements. ETT 8/3-8/12, extubated 8/13. Cleared for thick liquids and puree.    C/f stroke 8/17, MRI negative for acute infarct or hemorrhage      SLP Pain    Date/Time Pain Type Rating: Rest Rating: Activity [facility]   08/19/22 0854 Pain Assessment 0 - no pain 0 - no pain KAYLENE          Prior Living Environment    Flowsheet Row Most Recent Value   Living Environment Comment Lives alone in 2 SH with NE on first floor and bed/bath upstairs. Tub shower. Sons installed stair glide last week.        Prior Level of Function    Flowsheet Row Most Recent Value   Dominant Hand right   Eating independent   Communication difficulty speaking (not related to language barrier)  [per RN, pt's kaideniyl reports dysarthria PTA]   Swallowing difficulty swallowing foods   Baseline Diet/Method of Nutritional Intake no diet restrictions   Past History of Dysphagia Pt with no hx of dysphagia prior to admission   Prior Level of Function Comment Mod(i) for BADLs and mobility using RW. Has  for meals and IADLs. Rosita assist PRN.   Assistive Device Currently Used at Home cane, straight, walker, front-wheeled           SLP Evaluation and Treatment - 08/19/22 0854        SLP Time Calculation    Start Time 0854     Stop Time 0911     Time Calculation (min) 17 min        Session Details    Document Type daily treatment/progress note     Mode of Treatment speech language pathology;individual therapy         General Information    Patient Profile Reviewed yes     General Observations of Patient Pt received in bed, now COVID +     Existing Precautions/Restrictions aspiration;fall;NPO;contact;droplet   +DHT, COVID+    Limitations/Impairments safety/cognitive;swallowing        Cognition/Psychosocial    Affect/Mental Status (Cognition) low arousal/lethargic     Behavioral Issues (Cognition) withdrawn     Orientation Status (Cognition) oriented x 4     Comment, Cognition Pt less confused than previous day. pleasant and cooperative. Continued difficulty completing dysphagai exercises. SLP re-reviewed        Vocal Quality/Secretion Management (Oral Motor)    Vocal Quality (Oral Motor) hypophonic;dysphonic;breathy;hoarse        Motor Speech    Speech Intelligibility (Motor Speech) conversational level     Phrase/Sentence Level, Speech Intelligibility (Motor Speech) moderate impairment        Auditory Comprehension    Comment, Intervention (Auditory Comprehension) Pt continues with difficulty following more complex commands, sequences (swallow exercises), pt is able to followsimple commands with increased time and repetitions as needed        Verbal Expression    Comment, Intervention (Verbal Expression) Pt with less confusion noted this day, pt is able to state simple wants and needs, however, c/f higher-level needs. RN should continue to anticipate the pt's higher-level wants and needs        Functional Communication Measures    FCM: Swallowing 2-->Level 2        General Swallowing Observations    Current Diet/Method of Nutritional Intake NPO;nasogastric tube (NG)     Signs/Symptoms of Aspiration (Current Diet) concerns for silent aspiration;cough     Respiratory Support (General Swallowing Observations) none     Comment, Secretions/Suctioning oral care completed     Comment, General Swallowing Observations SLP reviewed and assisted pt in completeign round of swallow exercises x10 reps. Oral care completed, trials with ice  chips, trials with tsp cold water with goal of sequencing rbeath hold        Food and Liquid Trials (NIS)    Patient Positioning HOB elevated (specify degrees)     Oral Intake/Feeding Performance oral trials administered by therapist     Liquid Consistencies Evaluated thin liquids     Thin Liquids use of teaspoon   ice chips x3 triasl, tsp ice water x5 trials    Comment, Thin Liquids ice chips-to assess if appro for comfort-no overt s/sx . tsp ice water-sequence breath hold-ongoing c/f ineffective completion.     Food Consistencies Evaluated --   deferred d/t results of VFSS, targeting thermal stim and swallow sequence    Oral Preparatory Phase of Swallow moderate impairment;bolus cohesion decreased;oral holding of bolus;suspect posterior bolus leak     Oral Phase of Swallow moderate impairment;delayed anterior-posterior transit;lingual pumping;suspect posterior bolus leak     Pharyngeal Phase of Swallow delayed swallow reflex initiation     Comment, Pharyngeal Phase no overt s/sx of aspiration, cannot evette-out silent aspiration     Esophageal Phase of Swallow no clinical symptoms     Comment Pt now COVID +, junky cough observed at baseline        Swallowing Recommendations    Diet Consistency Recommendations NPO     Medication Administration non-oral route     Instrumental Assessment Recommendations reassess via non-instrumental clinical swallow evaluation     Comment, Swallowing Recommendations Strict aspiration precautions including frequent and stringent oral care with suction PRN. FOR COMFORT, single ice chip (1-3/hour) with RN supervision, AFTER oral care.        Swallowing Intervention    Dysphagia/Swallowing Interventions monitor tolerance of;advanced diet/liquid texture trials;oral sensory stimulation program;oral therapeutic exercise program;pharyngeal therapeutic exercise program     Oral Sensory Stimulation Program oral stimulation using temperature     Pharyngeal Therapeutic Exercise Program effortful  phonation of eee;tongue-base retraction        AM-PAC (TM) - Cognition (Current Function)    Following/understanding a 10-15 minute speech or presentation? 3 - A little     Understanding familiar people during ordinary conversations? 3 - A little     Remembering to take medications at the appropriate time? 1 - Unable     Remembering where things were placed or put away? 2 - A lot     Remembering a list of 3 or 4 errands without writing it down? 2 - A lot     Taking care of complicated tasks? 1 - Unable     AM-PAC (TM) Cognition Score 12        Assessment/Plan (SLP)    Daily Outcome Statement Dysphagia follow-up completed at the bedside.  Patient now COVID-positive, requiring droplet and contact precautions.  Patient awake and alert, oriented x4.  Patient with junky coughing at baseline.  SLP reviewed swallow exercises, patient completed 4 exercises x10 trials with SLP cueing.  Patient continues with reduced carryover of independent completion of swallow exercises.  Following oral care, completion of single ice chips for comfort without signs or symptoms of aspiration.  Trials of thin ice water completed with goal of sequencing with breath-hold.  Patient with no overt signs or symptoms of aspiration vocal quality remained clear.  Patient with known history of silent aspiration, cannot rule out silent aspiration at the bedside.  Recommend patient remain strict n.p.o., medication and nutrition via DHT.  For comfort, single ice chips (1 to 3/h) after oral care, with RN supervision.  Speech therapy for ongoing follow-up, considering repeat video swallow if patient remains in acute hospital early next week.  If patient is discharged from acute hospital, would recommend repeat imaging prior to initiation of p.o. diet due to silent aspiration with all consistencies     Rehab Potential --   guarded    Therapy Frequency 5 times/wk     Planned Therapy Interventions dysphagia therapy               SLP Assessment/Plan     Flowsheet Row Most Recent Value   SLP Diagnosis Per video swallow moderate to severe oral stage and severe pharyngeal stage dysphagia, dysphonia, dysarthria, now COVID-positive at 08/19/2022 0854   Patient/Family Therapy Goal Statement none stated at 08/19/2022 0854                    SLP Goals    Flowsheet Row Most Recent Value   Oral Nutrition/Hydration Goal 1    Activity effective/safe/independent, oral nutrition/hydration, use of swallowing techniques, management of texture/viscosity at 08/13/2022 1026   Time Frame long-term goal (LTG), by discharge at 08/13/2022 1026   Strategies/Barriers ETT x9 days, high oxygen demands at 08/13/2022 1026   Progress/Outcome goal ongoing at 08/19/2022 0854

## 2022-08-19 NOTE — PLAN OF CARE
Per update from surgical team pt is making progress towards d/c but will be here through the weekend. Pt may be stable for d/c next week and may be appropriate for LTACH (vs SNF).     MARY spoke with pt's son Marko Elena [phone] to begin discussion regarding dispo planning. MARY informed him of estimated date of d/c next week and need for post acute care facility including LTACH or SNF.    Marko was made aware of right to choose a provider. A list of eligible providers was presented and reviewed with him in verbal form. The list delineates providers in the patient’s desired geographic area who are participating in the Medicare program and/or providers contracted with the patient’s primary insurance. Marko was agreeable with referral to [facility]. If [facility] cannot accept or pt does not meet criteria for LTACH then Marko will consider SNF and MARY will discuss specific facilities and referrals at that time.     MARY made referral to [facility] liaisonEden and she will review for potential admission next week.    MARY will continue to follow for emotional support and dispo planning. Ophelia Singh LCSW

## 2022-08-19 NOTE — PLAN OF CARE
Per CPR this am pt is likely stable for d/c Monday pending medical Stability. SW received update from SOPHIA Oliveira that pt can be ready as soon as the weekend.     Pt may be appropriate for LTACH, this loc was discussed with pt and son. Pt rec for SNF by PT/OT/PMR.     If plan if for pt to keep [facility]bhoff, he can only d/c to LTACH. SNF will not accept. If plan is for pt to get peg he will likely meet criteria for STR.     Pt is new COVID positive which may be a barrier to placement. SW reached out to Eden in admissions at [facility] to see if they can admit covid positive patients and if they are willing to accept. [facility] will check bed availibility.    At this time [facility] will need to check for next available bed.   [facility] will update us if they can accept COVID patient and offer a bed.   Pt will need BLS transport for isolation precautions.   If plan is for peg and STR, SW will need to offer family SNF choices.    SW provided emotional support, transition of care needs and dispo planning. Kizzy Olivera [facility] [phone].

## 2022-08-19 NOTE — PATIENT CARE CONFERENCE
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

## 2022-08-19 NOTE — PLAN OF CARE
Problem: Adult Inpatient Plan of Care  Goal: Plan of Care Review  Outcome: Progressing  Flowsheets (Taken 8/19/2022 6837)  Progress: improving  Plan of Care Reviewed With: patient  Outcome Summary: PT re-eval complete. Rec SNF

## 2022-08-19 NOTE — NURSING NOTE
Wound Ostomy Continence Note    Subjective    HPI Patient is a 89 y.o. 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   • Necrotizing soft tissue infection   • Shock (CMS/HCC)   • Palliative care by specialist   • Debility   • Hyperkalemia      PMH/PSH Medical History:   Past Medical History:   Diagnosis Date   • Atrial fibrillation (CMS/HCC)    • Disease of thyroid gland    • GI (gastrointestinal bleed)    • Hypertension    • Myocardial infarction (CMS/HCC)      Surgical History:   Past Surgical History:   Procedure Laterality Date   • BYPASS GRAFT        Assessment and Recommendation attempted for ostomy teaching now;   --Down graded to Med-surg unit, has DHT for feeding, PT is following, not has capacity to learn stomy care now; starter kit with supplies at bedside;   --Sign off now, re-consult if and when he is able to learn the ostomy care;              Date: 08/19/22  Signature: Madonna Park RN

## 2022-08-19 NOTE — PROGRESS NOTES
Palliative Medicine Progress Note    Assessment and Plan    89 year old Justin's gangrene c/b dysphagia and encephalopathy    # Debility  The patient has steadily improved but is deconditioned and having dysphagia. DHT remains in place. If he is able to go to LTACH, they will take him with DHT. He may still require a PEG tube if things do not improve moving forward. I spoke to him and his son Marko about this. They are both amenable to PEG tube but understandably are hopeful that he regains the ability to safely swallow soon enough for them to avoid it. Goals remain restorative as the patient continues to progress. He is DNR/DNI. Discussed with primary team. Should anything change, we are available for additional family meetings.    # Dysphagia  - DHT in place  - patient/family amenable to PEG tube if needed in the future      Franc Givens MD        Subjective  No bothersome symptoms. COVID + yesterday 8/18 but no dyspnea/cough/fever.    Review of Systems:  All other systems reviewed and negative except as noted above    Objective    Current Medications:  •  acetaminophen, 650 mg, oral, q4h PRN  •  aspirin, 81 mg, oral, Daily  •  atorvastatin, 40 mg, oral, Daily (6p)  •  collagenase, 1 application, Topical, Daily  •  glucose, 16-32 g of dextrose, oral, PRN **OR** dextrose, 15-30 g of dextrose, oral, PRN **OR** glucagon, 1 mg, intramuscular, PRN **OR** dextrose 50 % in water (D50), 25 mL, intravenous, PRN  •  heparin (porcine), 5,000 Units, subcutaneous, q8h IVÁN  •  HYDROmorphone, 0.25 mg, intravenous, q3h PRN  •  insulin regular, 3-5 Units, subcutaneous, q6h IVÁN  •  insulin regular, 4 Units, subcutaneous, q6h IVÁN  •  lansoprazole, 30 mg, feeding tube, Daily  •  levothyroxine, 100 mcg, oral, Daily (6:30a)  •  metoprolol tartrate, 25 mg, oral, BID  •  sodium hypochlorite, , Topical, Daily  •  torsemide, 10 mg, oral, Daily    Palliative Performance Score: 40    Physical Exam:  Physical Exam  Constitutional:        General: He is not in acute distress.  HENT:      Head: Atraumatic.      Nose:      Comments: DHT  Eyes:      Conjunctiva/sclera: Conjunctivae normal.   Pulmonary:      Effort: No respiratory distress.   Abdominal:      General: There is no distension.   Musculoskeletal:      Comments: sarcopenia   Skin:     General: Skin is warm.      Findings: No rash.   Neurological:      General: No focal deficit present.      Mental Status: He is alert.   Psychiatric:         Mood and Affect: Mood normal.           Vitals:  Vitals:    08/19/22 0843   BP: 107/60   Pulse: 90   Resp: 18   Temp: 36.6 °C (97.9 °F)   SpO2: 95%       Laboratory Studies:    CBC Results       08/18/22 08/17/22 08/16/22     0524 0322 0552    WBC 8.51 9.03 8.41    RBC 2.82 3.05 2.98    HGB 9.8 10.6 10.5    HCT 29.2 31.5 32.2    .5 103.3 108.1    MCH 34.8 34.8 35.2    MCHC 33.6 33.7 32.6     98 99         Comment for PLT at 0524 on 08/18/22: CONSISTENT WITH PREVIOUS RESULTS    Comment for PLT at 0552 on 08/16/22: CONSISTENT WITH PREVIOUS RESULTS        CMP Results       08/18/22 08/17/22 08/17/22     0524 2133 0322     138 142    K 3.6 3.7 3.5    Cl 108 109 111    CO2 24 24 25    Glucose 166 192 221    BUN 27 27 28    Creatinine 0.7 0.7 0.8    Calcium 7.6 7.7 8.0    Anion Gap 5 5 6    AST 87 -- 91    ALT 33 -- 35    Albumin 1.3 -- 1.5    EGFR >60.0 >60.0 >60.0            Labs reviewed. Notable for cr 0.7    Imaging and Other Studies:     Radiology Imaging    XR CHEST 1 VW    Narrative  CLINICAL HISTORY: intubated   .    PROCEDURE: AP erect portable chest radiograph.    COMPARISON: Chest radiograph of August 13, 2022.    COMMENT:  Support lines, tubes, devices, and surgical hardware: Sternal sutures. Enteric  tube with tip off the inferior margin of the image. Right arm PICC with tip at  cavoatrial junction.  Lungs: Left lower lobe consolidation, unchanged.  Pleura: Bilateral pleural effusions, greater on the right. No  pneumothorax  Heart/mediastinum: Stable cardiomegaly.  Bones/soft tissues: Intact.    --    Impression  Left lower lobe consolidation and possible small pleural effusion.  Larger right pleural effusion.                                                                          Imaging/cardiac studies notable for: QTc 536 ms on ECG

## 2022-08-19 NOTE — PROGRESS NOTES
General Surgery Daily Progress Note    Subjective     Interval History:   Patient transferred to stepdown from SICU yesterday  Pt was COVID swabbed in anticipation of tentative PEG placement (failed recent video swallow w/ silent aspirations) and tested positive (last neg COVID test was 8/2/22). Patient has had mild productive cough, reports continues to improve. Denies fever/chills, SOB. Getting TFs via DHT without nausea/vomiting, abdominal pain or distention. Denies any pain along perirectal/buttock region at site of wound debridement.      Objective     Vital signs in last 24 hours:  Temp:  [36 °C (96.8 °F)-37.1 °C (98.7 °F)] 36.6 °C (97.9 °F)  Heart Rate:  [85-99] 90  Resp:  [18] 18  BP: ()/(53-68) 107/60      Intake/Output Summary (Last 24 hours) at 8/19/2022 1025  Last data filed at 8/19/2022 0500  Gross per 24 hour   Intake 1354.58 ml   Output 2790 ml   Net -1435.42 ml     Intake/Output this shift:  No intake/output data recorded.    Physical Exam    General appearance: A&O x3, comfortable, in good spirits and cheerful  ENT: DHT secured at nares  Neck: no JVD  Lungs: Normal respiratory effort, 95% on room air. No cough appreciated  Heart: Afib, regular rate  Abdomen: stoma pink and protruding, productive for gas and thickish brown stool. Abdomen soft, distended, non-tender and not tympanitic.   Extremities: extremities normal, warm and well-perfused; no cyanosis, clubbing, or edema  Pulses: 2+ and symmetric  Skin: Skin color, texture, turgor normal. No rashes or lesions  Neurologic: Grossly normal    VTE Assessment: [facility]    Labs  Results from last 7 days   Lab Units 08/18/22  0524 08/17/22  0322 08/16/22  0552   WBC K/uL 8.51 9.03 8.41   HEMOGLOBIN g/dL 9.8* 10.6* 10.5*   HEMATOCRIT % 29.2* 31.5* 32.2*   PLATELETS K/uL 113* 98* 99*     Results from last 7 days   Lab Units 08/18/22  0524 08/17/22  2133 08/17/22  0322 08/16/22  1816 08/16/22  0552   SODIUM mEQ/L 137 138 142   < > 145*   POTASSIUM mEQ/L  3.6 3.7 3.5*   < > 3.6   CHLORIDE mEQ/L 108 109 111*   < > 117*   CO2 mEQ/L 24 24 25   < > 24   BUN mg/dL 27* 27* 28*   < > 32*   CREATININE mg/dL 0.7* 0.7* 0.8   < > 0.8   CALCIUM mg/dL 7.6* 7.7* 8.0*   < > 7.9*   ALBUMIN g/dL 1.3*  --  1.5*  --  1.5*   BILIRUBIN TOTAL mg/dL 0.9  --  0.7  --  0.8   ALK PHOS IU/L 123  --  113  --  104   ALT IU/L 33  --  35  --  33   AST IU/L 87*  --  91*  --  84*   GLUCOSE mg/dL 166* 192* 221*   < > 195*    < > = values in this interval not displayed.         Imaging  No new imaging from the prior 24hrs      Assessment/Plan      89 y/o male with PMH of biventricular heart failure, HTN, Afib, CAD who is now s/p EUA wound debridement of gluteal NSTI on 8/2 and diverting loop sigmoid colostomy on 8/3.    Extubated on 8/13  Now COVID positive 8/18, asymptomatic    - Continue TF @ goal, transitioned to [employer] yesterday per nutrition  - Consider out-patient PEG once patient has recovered from current illness / operations if unable to return to normal po intake despite aggressive SLP therapy  -F/u neurology, suspect subacute infarct as source of dsyarthria. Continue ASA and statin therapy. No further w/u needed.  -F/u cardiology - resumed home dose of torsemide 10mg daily  -F/u ENT - no acute intervention.   -F/u Endocrine recs regarding insulin management.and TFs  -Encourage ISS and pulmonary toilet.  -PT/OT   -WOCN for new ostomy teaching   -Continue BID packing changes to wound with santyl and dakins.   - Case management following for SNF placement, [facility] following and will accept patient with DHT. CM to f/u regarding new COVID dx    SOPHIA Portillo   General Surgery  Please page [facility] service [phone] with any questions/concerns.

## 2022-08-19 NOTE — PROGRESS NOTES
Patient: Samy Elena  Location:  [place] [address]  MRN:  [id number]  Today's date:  8/19/2022     Spoke to RN; appropriate    Concluded session w/ pt in chair. Call bell in reach, alarm set, eve placed, and RN aware of mobility outcomes    Samy is a 89 y.o. 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    Past Medical History  Samy has a past medical history of Atrial fibrillation (CMS/HCC), Disease of thyroid gland, GI (gastrointestinal bleed), Hypertension, and Myocardial infarction (CMS/HCC).    History of Present Illness   Admit from OP MD's office 8/2 for denise's gangrene. s/p EUA wound debridement of gluteal NSTI on 8/2 and diverting loop sigmoid colostomy on 8/3  He was then admitted to the surgical ICU where he was intubated and had transient pressor requirements. ETT 8/3-8/12, extubated 8/13. Cleared for thick liquids and puree.    C/f stroke 8/17, MRI negative for acute infarct or hemorrhage      PT Vitals    Date/Time Pulse SpO2 Pt Activity O2 Therapy BP Pt Position Observations [facility]   08/19/22 1133 90 94 % At rest None (Room air) 116/92 Lying --    08/19/22 1155 -- -- -- -- 81/69 Sitting asymptomatic    08/19/22 1158 -- -- -- -- 126/63 -- reclined in chair       PT Pain    Date/Time Rating: Rest Rating: Activity [facility]   08/19/22 1133 0 0 CH          Prior Living Environment    Flowsheet Row Most Recent Value   Living Environment Comment Lives alone in 2 SH with [place] on first floor and bed/bath upstairs. Tub shower. Sons installed stair glide last week.        Prior Level of Function    Flowsheet Row Most Recent Value   Dominant Hand right   Eating independent   Communication difficulty speaking (not related to language barrier)  [per RN, pt's famiyl reports dysarthria PTA]   Swallowing difficulty swallowing foods   Baseline Diet/Method of Nutritional Intake no diet restrictions   Past  History of Dysphagia Pt with no hx of dysphagia prior to admission   Prior Level of Function Comment Mod(i) for BADLs and mobility using RW. Has  for meals and IADLs. Sons assist PRN.   Assistive Device Currently Used at Home cane, straight, walker, front-wheeled           PT Evaluation and Treatment - 08/19/22 1132        PT Time Calculation    Start Time 1132     Stop Time 1158     Time Calculation (min) 26 min        Session Details    Document Type re-evaluation     Mode of Treatment physical therapy        General Information    Patient Profile Reviewed yes     Onset of Illness/Injury or Date of Surgery 08/16/22     Referring Physician Noone     General Observations of Patient rec'd supine in bed; agreeable     Existing Precautions/Restrictions fall;aspiration;contact;droplet     Limitations/Impairments safety/cognitive;hearing        Cognition/Psychosocial    Affect/Mental Status (Cognition) low arousal/lethargic     Behavioral Issues (Cognition) withdrawn     Orientation Status (Cognition) oriented x 3     Follows Commands (Cognition) follows one-step commands;increased processing time needed;repetition of directions required;verbal cues/prompting required     Comment, Cognition Pleasant and cooperative. Benefits from increased time and repeated cues        Strength (Manual Muscle Testing)    Hip, Left (Strength) 2+     Knee, Left (Strength) 3-     Ankle, Left (Strength) 3     Hip, Right (Strength) 2-     Knee, Right (Strength) 2+     Ankle, Right (Strength) 2+        Bed Mobility    [place], Supine to Sit maximum assist (25-49% patient effort);2 person assist;increased time to complete;safety considerations;verbal cues     Verbal Cues (Supine to Sit) hand placement;safety;technique     Assistive Device bed rails;draw sheet;head of bed elevated     Comment (Bed Mobility) OOB to L; Assist to clear LE toward EOB and trunk to upright position        Bed to Chair Transfer    [place], Bed to  Chair maximum assist (25-49% patient effort);2 person assist;increased time to complete;safety considerations;verbal cues     Verbal Cues hand placement;safety;technique     Assistive Device other (see comments)   /hha    Comment unable to take steps from bed->chair. More of stand pivot        Sit to Stand Transfer    [place], Sit to Stand Transfer maximum assist (25-49% patient effort);2 person assist     Verbal Cues hand placement;safety;technique     Assistive Device other (see comments)   HHA x 2    Comment From raised EOB. b/l knee block, difficulty completing full upright stance        Stand to Sit Transfer    [place], Stand to Sit Transfer maximum assist (25-49% patient effort);2 person assist;increased time to complete;safety considerations;verbal cues     Verbal Cues hand placement;safety;technique     Assistive Device other (see comments)   HHA x 2    Comment to recliner, poor eccentric contol        Gait Training    [place], Gait unable to assess     Comment (Gait/Stairs) unable to move LE in standing        Safety Issues, Functional Mobility    Impairments Affecting Function (Mobility) balance;coordination;endurance/activity tolerance;motor control;postural/trunk control;range of motion (ROM);shortness of breath;strength        Balance    Balance Assessment sit to stand dynamic balance     Static Sitting Balance mild impairment;supported     Dynamic Sitting Balance moderate impairment;supported     Sit to Stand Dynamic Balance severe impairment;supported     Static Standing Balance severe impairment;supported     Dynamic Standing Balance severe impairment;supported        AM-PAC (TM) - Mobility (Current Function)    Turning from your back to your side while in a flat bed without using bedrails? 2 - A Lot     Moving from lying on your back to sitting on the side of a flat bed without using bedrails? 2 - A Lot     Moving to and from a bed to a chair? 2 - A Lot     Standing up from a chair  using your arms? 2 - A Lot     To walk in a hospital room? 1 - Total     Climbing 3-5 steps with a railing? 1 - Total     AM-PAC (TM) Mobility Score 10        Assessment/Plan (PT)    Daily Outcome Statement Max A x 2 for bed mobilty and bed-Chair transfer. Difficulty supporting self in standing and unable to take actual steps to chair. Well below functional baseline at this time. Rec for further skilled therapy prior to d/c home     Rehab Potential good, to achieve stated therapy goals     Therapy Frequency 5 times/wk     Planned Therapy Interventions balance training;bed mobility training;gait training;transfer training;strengthening;stair training;ROM (range of motion)               PT Assessment/Plan    Flowsheet Row Most Recent Value   PT Recommended Discharge Disposition skilled nursing facility at 08/19/2022 1132   Anticipated Equipment Needs at Discharge (PT) other (see comments)  [TBD] at 08/19/2022 1132   Patient/Family Therapy Goals Statement get stronger at 08/19/2022 1132                    Education Documentation  Joint Mobility/Strength, taught by Catarino Yusuf, PT at 8/19/2022  5:20 PM.  Learner: Patient  Readiness: Acceptance  Method: Explanation  Response: Verbalizes Understanding  Comment: LE ther ex, progress as able, POC          PT Goals    Flowsheet Row Most Recent Value   Bed Mobility Goal 1    Activity/Assistive Device rolling to left, rolling to right, sit to supine, supine to sit at 08/16/2022 0948   [place] minimum assist (75% or more patient effort) at 08/16/2022 0948   Time Frame by discharge at 08/16/2022 0948   Progress/Outcome goal ongoing at 08/19/2022 1132   Transfer Goal 1    Activity/Assistive Device bed-to-chair/chair-to-bed, sit-to-stand/stand-to-sit at 08/16/2022 0948   [place] moderate assist (50-74% patient effort) at 08/16/2022 0948   Time Frame by discharge at 08/16/2022 0948   Progress/Outcome goal ongoing at 08/19/2022 1132   Gait Training Goal 1     Activity/Assistive Device gait (walking locomotion), assistive device use, decrease asymmetrical patterns, decrease fall risk, diminish gait deviation, improve balance and speed, increase endurance/gait distance, increase energy conservation at 08/19/2022 1132   [place] maximum assist (25-49% patient effort) at 08/19/2022 1132   Distance 5 at 08/19/2022 1132   Time Frame by discharge at 08/19/2022 1132   Progress/Outcome goal revised this date, goal ongoing at 08/19/2022 1132

## 2022-08-19 NOTE — DISCHARGE SUMMARY
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       DETAILS OF HOSPITAL STAY    Operative Procedures Performed  Procedure(s):  RIGHT HEART CATH    Consults:   Consult Notes 7/20/2022 to 8/19/2022         Date of Service   Author Author Type Status Note Type File Time    08/18/22 1112  Pamela Luna MD Physician Signed Consults 08/18/22 1437    08/17/22 1047  Juan Carlos Arrington DO Resident Cosign Needed Consults 08/17/22 1059    08/17/22 1024  Awa Chapman MD Physician Addendum Consults 08/17/22 1149    08/12/22 1651  Liliya Baker MD Physician Signed Consults 08/12/22 1714    08/10/22 1047  Yamilet Blair MD Resident Attested Consults 08/10/22 1145    08/10/22 1040  Madonna Park RN Wound Ostomy Continence Signed Consults 08/10/22 1408    08/03/22 0940  Madonna Park RN Wound Ostomy Continence Signed Consults 08/03/22 1131    08/03/22 0934  Suzanne Norris RD Registered Dietitian Signed Consults 08/03/22 0943    08/03/22 0729  Sabiha Cordon MD Fellow Attested Consults  08/03/22 1103    08/03/22 0610  Toni Ramirez MD Physician Signed Consults 08/03/22 0625    08/03/22 0538  Delmi Howell,  Resident Attested Consults 08/03/22 1705    08/02/22 2044  Samy Haddad,  Resident Attested Consults 08/02/22 2132    07/23/22 1058  Awa Chapman MD Physician Signed Consults 07/23/22 1438          Consult Orders During Admission:  IP CONSULT TO UROLOGY  IP CONSULT TO INFECTIOUS DISEASE  IP CONSULT TO CARDIOLOGY  IP CONSULT TO WOUND OSTOMY CONTINENCE  IP CONSULT TO PAIN/PALLIATIVE CARE  IP CONSULT TO WOUND OSTOMY CONTINENCE  IP CONSULT TO IV TEAM  IP CONSULT TO ENDOCRINOLOGY  IP CONSULT TO NEUROLOGY  IP CONSULT TO ENT  IP CONSULT TO PHYSICAL MEDICINE REHAB       Imaging  X-RAY ABDOMEN 1 VIEW    Result Date: 8/14/2022  IMPRESSION: Enteric tube tip in the stomach.    X-RAY ABDOMEN 1 VIEW    Result Date: 8/10/2022  IMPRESSION: The patient is rotated. ET tube tip projects 4 cm above the pamella. PA catheter with tip overlying the right pulmonary artery. Right IJ approach central venous catheter with tip projecting at the level of the cavoatrial junction. NG/OG tube is seen coursing into the stomach. Sidehole likely in the region of the distal gastric body. The patient is status post CABG with postsurgical changes noted. Many of the sternotomy wires are fractured. Monitoring leads/wires overly the patient. There are small bibasilar pleural effusions with underlying atelectasis and mild central vascular congestion. COMMENT: Support lines, tubes, devices, and surgical hardware, material: See IMPRESSION Lungs and Pleura: See IMPRESSION. No pneumothorax. Cardiovascular and Mediastinum: The cardiomediastinal silhouette is stable noting cardiomegaly and postoperative change. Other: Degenerative changes of the visualized spine. CLINICAL HISTORY:   tube reposition PROCEDURE: Single frontal view of the chest. COMPARISON:   8/3/2022     X-RAY ABDOMEN 1 VIEW    Result Date: 8/3/2022  IMPRESSION:  Enteric catheter with its tip at the expected location of the GE junction. At the time of interpretation, a subsequent x-ray demonstrating adequate placement has been performed.    X-RAY ABDOMEN 1 VIEW    Result Date: 8/3/2022  IMPRESSION: An enteric catheter now courses below the diaphragm with its tip overlying the expected location of the stomach. The remainder of the findings in the lower chest and upper abdomen appear stable.    CT HEAD WITHOUT IV CONTRAST    Result Date: 7/22/2022  IMPRESSION: No evidence of an acute territorial infarct or intracranial hemorrhage.  Sequela chronic small vessel ischemic disease. COMMENT: Comparison:  None. TECHNIQUE: CT of the brain was performed and reconstructed/reformatted in the axial, coronal and sagittal planes. CT DOSE:  One or more dose reduction techniques (e.g. automated exposure control, adjustment of the mA and/or kV according to patient size, use of iterative reconstruction technique) utilized for this examination. INTRAVENOUS CONTRAST: None Brain parenchyma:  Age related involution and  ischemic small vessel changes. Gray-white matter differentiation is normal.  No evidence of intracranial hemorrhage, midline shift or other mass effect. Ventricles and cisterns:  Unchanged in size and configuration. Cranial carotid arteries: Mural calcification. Calvarium and extra cranial soft tissues:  No evidence of a displaced calvarial fracture.   Scalp soft tissue within normal limits. Visualized paranasal sinuses and mastoid air cells:  Mucosal thickening of the visualized paranasal sinuses.     MRI ANGIOGRAM HEAD WITHOUT CONTRAST    Result Date: 7/23/2022  IMPRESSION: 1. No acute intracranial abnormality, no acute infarct. 2. Mild microangiopathic changes. 3. 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 4. Unremarkable MRA of the neck. COMMENT: Technique:  The brain was scanned  in multiple planes with a variety of pulse sequences without contrast. Comparison:  Head CT dated July 22, 2022. Findings: Mild scattered periventricular and subcortical white matter T2 hyperintensities, nonspecific, likely sequela of microangiopathic disease. Sulci, ventricles and basal cisterns are within normal limits for patient's stated age. No mass-effect, intracranial hemorrhage, acute segmental infarct or extra-axial fluid collection is seen. Cerebellar tonsils are normal in position. Expected signal voids are seen in the intracranial vessels at the skull base. The orbits and sella are unremarkable.   The paranasal sinuses demonstrates circumferential mucosal thickening of the right maxillary sinus.  The mastoid air cells are clear. MR angiography head and neck without contrast Technique: An MRA of the [place] was performed utilizing a 3D time-of-flight technique. MRA of the neck was also performed utilizing 2-D and 3-D time-of-flight technique. No prior studies are available for comparison. 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 Left vertebral artery is dominant. The right vertebral artery ends in PICA. Near fetal origin left PCA. No medium or large size aneurysms are seen in the [place]. Please note that MRA may be insensitive in the detection of aneurysms smaller than 4 mm. MRA of the neck demonstrates patency of the common carotid arteries, internal carotid arteries and vertebral arteries bilaterally. No evidence for vessel dissection, cutoff, hemodynamically significant stenoses by NASCET criteria.    MRI ANGIOGRAM NECK WITHOUT CONTRAST    Result Date: 7/23/2022  IMPRESSION: 1. No acute intracranial abnormality, no acute infarct. 2. Mild microangiopathic changes. 3. MRA of the head is remarkable for marked irregularity of the basilar artery with a moderate to severe  stenosis of the proximal basilar artery. 4. Unremarkable MRA of the neck. COMMENT: Technique:  The brain was scanned in multiple planes with a variety of pulse sequences without contrast. Comparison:  Head CT dated July 22, 2022. Findings: Mild scattered periventricular and subcortical white matter T2 hyperintensities, nonspecific, likely sequela of microangiopathic disease. Sulci, ventricles and basal cisterns are within normal limits for patient's stated age. No mass-effect, intracranial hemorrhage, acute segmental infarct or extra-axial fluid collection is seen. Cerebellar tonsils are normal in position. Expected signal voids are seen in the intracranial vessels at the skull base. The orbits and sella are unremarkable.   The paranasal sinuses demonstrates circumferential mucosal thickening of the right maxillary sinus.  The mastoid air cells are clear. MR angiography head and neck without contrast Technique: An MRA of the [place] was performed utilizing a 3D time-of-flight technique. MRA of the neck was also performed utilizing 2-D and 3-D time-of-flight technique. No prior studies are available for comparison. 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 Left vertebral artery is dominant. The right vertebral artery ends in PICA. Near fetal origin left PCA. No medium or large size aneurysms are seen in the [place]. Please note that MRA may be insensitive in the detection of aneurysms smaller than 4 mm. MRA of the neck demonstrates patency of the common carotid arteries, internal carotid arteries and vertebral arteries bilaterally. No evidence for vessel dissection, cutoff, hemodynamically significant stenoses by NASCET criteria.    MRI BRAIN WITHOUT CONTRAST    Result Date: 8/17/2022  IMPRESSION: No acute infarct, acute intracranial hemorrhage or mass effect. Chronic microangiopathy and  involutional changes.     MRI BRAIN WITHOUT CONTRAST    Result Date: 7/23/2022  IMPRESSION: 1. No acute intracranial abnormality, no acute infarct. 2. Mild microangiopathic changes. 3. 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 4. Unremarkable MRA of the neck. COMMENT: Technique:  The brain was scanned in multiple planes with a variety of pulse sequences without contrast. Comparison:  Head CT dated July 22, 2022. Findings: Mild scattered periventricular and subcortical white matter T2 hyperintensities, nonspecific, likely sequela of microangiopathic disease. Sulci, ventricles and basal cisterns are within normal limits for patient's stated age. No mass-effect, intracranial hemorrhage, acute segmental infarct or extra-axial fluid collection is seen. Cerebellar tonsils are normal in position. Expected signal voids are seen in the intracranial vessels at the skull base. The orbits and sella are unremarkable.   The paranasal sinuses demonstrates circumferential mucosal thickening of the right maxillary sinus.  The mastoid air cells are clear. MR angiography head and neck without contrast Technique: An MRA of the [place] of [place] was performed utilizing a 3D time-of-flight technique. MRA of the neck was also performed utilizing 2-D and 3-D time-of-flight technique. No prior studies are available for comparison. 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 Left vertebral artery is dominant. The right vertebral artery ends in PICA. Near fetal origin left PCA. No medium or large size aneurysms are seen in the [place]. Please note that MRA may be insensitive in the detection of aneurysms smaller than 4 mm. MRA of the neck demonstrates patency of the common carotid arteries, internal carotid arteries and  vertebral arteries bilaterally. No evidence for vessel dissection, cutoff, hemodynamically significant stenoses by NASCET criteria.    FLUOROSCOPY VIDEO SWALLOW WITH SPEECH    Result Date: 8/16/2022  IMPRESSION: 1. Moderate to severe oral stage dysphagia. 2. Severe pharyngeal stage dysphagia characterized by swallow delay, reduced tongue-base retraction, severely reduced to absent epiglottic inversion, chronic silent aspiration with all consistencies. A cued cough was ineffective at clearing aspirated materials. 3. The esophagus was not assessed during today's study. Betsy Benitez PA-C was present for this examination. Dr. Alice Dumont agrees only with the radiographic findings.  Specific swallowing recommendations are solely the purview of the Speech Pathology Division. This study was conducted and written by Candace Feldman MS, CCC-SLP/L.    X-RAY CHEST 1 VIEW    Result Date: 8/15/2022  IMPRESSION: Left lower lobe consolidation and possible small pleural effusion. Larger right pleural effusion.    X-RAY CHEST 1 VIEW    Result Date: 8/13/2022  IMPRESSION: Bilateral effusions. COMMENT: Two AP radiographs of the chest reveal cardiomegaly.  Sternal sutures are seen.  There is a prosthetic valve ring.  Right PICC is seen in place. There are bilateral effusions right greater than left.  There is no pneumothorax.  Surgical staples overlie the left hilum.  Endotracheal tube and nasogastric tube seen on the previous exam have been  Removed    X-RAY CHEST 1 VIEW    Result Date: 8/12/2022  IMPRESSION: Overall, no significant interval change.    X-RAY CHEST 1 VIEW    Result Date: 8/11/2022  IMPRESSION: PICC line tip at the cavoatrial junction.    X-RAY CHEST 1 VIEW    Result Date: 8/11/2022  IMPRESSION: Please see comment.    X-RAY CHEST 1 VIEW    Result Date: 8/10/2022  IMPRESSION: The patient is rotated. ET tube tip projects 4 cm above the pamella. PA catheter with tip overlying the right pulmonary artery. Right IJ  approach central venous catheter with tip projecting at the level of the cavoatrial junction. NG/OG tube is seen coursing into the stomach. Sidehole likely in the region of the distal gastric body. The patient is status post CABG with postsurgical changes noted. Many of the sternotomy wires are fractured. Monitoring leads/wires overly the patient. There are small bibasilar pleural effusions with underlying atelectasis and mild central vascular congestion. COMMENT: Support lines, tubes, devices, and surgical hardware, material: See IMPRESSION Lungs and Pleura: See IMPRESSION. No pneumothorax. Cardiovascular and Mediastinum: The cardiomediastinal silhouette is stable noting cardiomegaly and postoperative change. Other: Degenerative changes of the visualized spine. CLINICAL HISTORY:   tube reposition PROCEDURE: Single frontal view of the chest. COMPARISON:   8/3/2022     X-RAY CHEST 1 VIEW    Result Date: 8/10/2022  IMPRESSION: Endotracheal tube with tip approximately 3 cm in the level of the pamella. Additional tubes and lines as described. Suggestion of layering right greater than left pleural effusions with associated bibasilar opacities, likely similar compared to earlier study of the same day.    X-RAY CHEST 1 VIEW    Result Date: 8/9/2022  IMPRESSION: No significant change since 8/8/2022.    X-RAY CHEST 1 VIEW    Result Date: 8/8/2022  IMPRESSION: Stable chest.     X-RAY CHEST 1 VIEW    Result Date: 8/8/2022  IMPRESSION: Stable chest.     X-RAY CHEST 1 VIEW    Result Date: 8/7/2022  IMPRESSION: Small right greater than left pleural effusions with underlying atelectasis. Support devices in place as described below. COMMENT: Support lines, tubes, devices, and surgical hardware, material: Endotracheal tube tip 3.37 m above the pamella. NG/OG tube is seen coursing into the stomach. Left IJ approach PA catheter with tip overlying the right pulmonary artery. Right IJ approach central venous catheter with tip projecting  at the level of the upper right atrium. Sternal wires and mediastinal clips typical prior CABG, with several fractured sternal wires. Monitoring leads overlie the patient. Lungs and Pleura: See Impression. No pneumothorax. Cardiovascular and Mediastinum: The cardiomediastinal silhouette is stable noting postoperative change. Other: Osseous structures appear unchanged. CLINICAL HISTORY:   intubated PROCEDURE: Single frontal view of the chest. COMPARISON:   Comparison is made to exam on the prior day     X-RAY CHEST 1 VIEW    Result Date: 8/6/2022  IMPRESSION: Bilateral small effusions COMMENT: Two AP radiographs the chest reveal cardiomegaly.  There are bilateral effusions.  Endotracheal tube, left-sided central venous catheter, right-sided Valencia-Niki catheter and nasogastric tube are in appropriate position.  Sternal sutures are seen.  Surgical staples overlie the cardiac silhouette.  Comparison is made to previous study dated 8/5/2022 and has been no significant interval change.    X-RAY CHEST 1 VIEW    Result Date: 8/5/2022  IMPRESSION: 1. Valencia-Niki catheter placement. Tip projecting over the right pulmonary artery laterally. No pneumothorax. 2. Otherwise little change    X-RAY CHEST 1 VIEW    Result Date: 8/4/2022  IMPRESSION: Stable chest    X-RAY CHEST 1 VIEW    Result Date: 8/3/2022  IMPRESSION: See above.    X-RAY CHEST 1 VIEW    Result Date: 8/3/2022  IMPRESSION: See above.    X-RAY CHEST 1 VIEW    Result Date: 7/23/2022  IMPRESSION: Findings suspicious for congestive heart failure. COMMENT: AP radiograph of the chest was obtained.  We have no prior similar studies for comparison.  There is cardiomegaly.  There is vascular congestion/interstitial edema.  There are bilateral effusions.  Findings suggest congestive heart failure.  Sternal sutures are seen.  There is no pneumothorax. We have no prior similar studies for comparison.    Ultrasound venous leg right    Result Date: 7/23/2022  IMPRESSION: No evidence  "for right -sided deep venous thrombosis in the visualized veins.      Presenting Problem/History of Present Illness  Necrotizing soft tissue infection [M79.89]     Patient is a 89 y.o. male who presents from an outpatient office visit with Dr. Bryant with concern for a necrotizing soft tissue infection of the rectal/perianal area tracking into the perineum and possible scrotal involvement with concern for Justin's gangrene.  Patient history was obtained to the son due to altered mental status and the father.     Son states that last week the patient was taken to the emergency department because of a drop in blood pressure and altered mental status.  The patient was told in the ER that he had proteinuria and that his blood sugar was high-is the first time that the patient had been told that from a physician.  The patient was then transferred to an acute care facility ([facility]) for continued care.     While at [facility]  The patient found it difficult to sit down and a marble sized lesion was noted around the patient's rectum.  The staff thought it was a cyst at the time, but over the next several days the \"cyst\" enlarged.  This morning the patient son noticed that he was altered, his blood pressure had dropped, and the patient was sent for surgical consult with Dr. Bryant.  While in the office, Dr. Bryant performed an incision and drainage and noted anteriorly tracking fluid with concern for Justin's gangrene and sent the patient to the emergency department for further evaluation and operative management    Exam on Day of Discharge  Patient seen and examined on day of discharge.    General appearance: AA&O x4, comfortable, in good spirits and cheerful  ENT: DHT secured at nares  Neck: no JVD  Lungs: Normal respiratory effort, 95% on room air. No cough appreciated  Heart:  regular rate  Abdomen: stoma pink and protruding, productive for gas and semi-solid brown stool. Abdomen soft, nondistended, non-tender and " not tympanitic.   Genito-rectal: Penis is wrapped with kerlix and xeroform. Dressing is wet with serosanguinous discharge. No active bleeding. The kathleen-rectal packing is in place with minimal serosanguinous drainage  Skin: edematous scrotum and penis, kerlix dressing saturated with serous drainage   Neurologic: Grossly normal    Hospital Course    Patient presented to [facility] emergency department on 8/2 at the request of Dr. Bryant for emergent debridement of suspected foreign years gangrene of the perineum that was noted on outpatient office.  Patient was started on vancomycin, Zosyn, and clindamycin and went to the OR emergently for wound debridement.  The patient tolerated the procedure and was discharged to the SICU with pressor requirements.  That night, the patient received a 1 L bolus of lactated Ringer's followed by a second 1 L bolus of lactated Ringer's, 2 units of fresh frozen plasma, and 500 cc of albumin.  The next day he was restarted on his Lopressor and received an echocardiogram.  He again received another 250 cc of albumin and went back to the OR for a second look at debridement, as well as a diverting colostomy to keep the wound area clean.  He was again discharged back to the SICU on pressor requirements and a bicarbonate drip for acidosis.    On postop day 1 he was given 40 mg of Lasix IV for his known heart failure and volume overload and received a right heart cathete and echocardiogram r.  His right heart catheter was similar to a previous right heart catheter that was obtained outpatient without any new significant developments.  Echocardiogram results are similar.  Is by carbonate drip was stopped at this time.  He also failed a spontaneous breathing trial for apnea at this time and was reintubated.    On postop day 2 he was started on tube feeds via Dobbhoff tube and tolerated this well.  His antibiotic regimen was narrowed to clindamycin and cefepime based on cultures that  were obtained in the OR.  Vasopressin was weaned and he had a stable blood pressure.  A Everett-Niki catheter was also placed at this time.    On postop day 3 he began 40 mg of Lasix 3 times per day for volume overload, a HIT panel was negative, and his dobutamine was weaned off.  Tube feeds were advanced and he tolerated this well.    On postop day 4, he was continued on diuresis was held in the afternoon for low blood pressure.  He received 1 dose of liquid Tylenol for a fever of 100.4 °F and was pancultured and x-rayed.  These imaging results were ultimately negative.    On postop day 5 he was given a 500 cc bolus of LR for increased Levophed requirement.  Clindamycin was discontinued at this time at the discretion of infectious disease.  He was started on a 50 cc every 4 hour free water flush for his Dobbhoff feeding tube.  The next day he was started on an insulin drip for persistently high blood glucose and endocrine was consulted.  His arterial line failed at this time and the risk of replacement outweighed the benefit of replacement so it was discontinued.  He again failed a spontaneous breathing trial due to increased work of breathing at this time.    On postop day 7 his fentanyl was weaned and the Everett-Niki catheter was removed.  His endotracheal tube became dislodged but was successfully replaced by anesthesia.  The next day he received a PICC line and his Precedex was weaned.    On postop day 9, his internal jugular central venous catheter was discontinued.  He was extubated at this time successfully.  Full supportive measures were continued, but his CODE STATUS was switched to DNR/DNI.  Palliative care was consulted and clarified goals of care with the patient and family.  No new events occurred the next day, but he was switched to p.o. metoprolol at this time.    On postop day 11, he was evaluated by speech and language pathology and he was started on thins and purées.  The next day he was transferred to  the floor as he no longer required SICU level management.    On postop day 13, 8/16, he was put on calorie counts and and went for a swallowing study with fluoroscopy with speech swallow.  Unfortunately, this study showed severe swallowing dysfunction, and he was downgraded back to a strict n.p.o. diet with only tube feeds.  At this point there was concern for a stroke versus ENT dysfunction of both neurology and otolaryngology were consulted for evaluation and management recommendations.    On postop day 14 neurology saw the patient, and recommended beginning aspirin 81 mg and atorvastatin 40 mg, as well as a brain MRI to evaluate for stroke.  MRI was unremarkable.  ENT evaluated the patient and determined that no acute surgical intervention from their standpoint was warranted.  Neurology continue to follow the patient and recommended intense speech swallow rehab for regaining function.    On postop day 15, physical medicine rehabilitation evaluated the patient and provided management recommendations for skilled nursing facility.  Mr. Adame was doing much better at this time and was determined that he could begin work-up for discharge to a SNF.  Endocrinology recommended regular insulin 4 units every 6 hours while he is on tube feeds, and sliding scale correction every 6 hours as needed.  It was determined that on outpatient, he would be further evaluated permanent PEG placement if rehabilitation for his severe swallowing dysfunction was not corrected.  Palliative care was reengaged in order to clarify goals of care for the patient as he was stable for discharge at this time.    Over the next few days, he was maintained on current recommendations without adjustments while waiting placement for SNF, which unfortunately took much longer than anticipated.  During this time it was noted that he had several penile lesions that required debridement by urology.  Wound ostomy care nursing saw him and provided management  recommendations for these wounds and urology determined that he was stable for outpatient follow-up.  On postop day 19, 8/23, he was deemed stable for discharge to SNF and was discharged with instructions to follow-up with endocrine, electrophysiology, neurology, and urology for outpatient management.  He was instructed to stop taking his Jardiance, hold his spironolactone and sildenafil, and continue taking his Plavix/aspirin/atorvastatin.       Discharge Orders     Medication List      ASK your doctor about these medications    acetaminophen 325 mg tablet  Commonly known as: TYLENOL  Take by mouth.     bisacodyL 10 mg suppository  Commonly known as: DULCOLAX  Insert into the rectum.     clopidogreL 75 mg tablet  Commonly known as: PLAVIX  Take 75 mg by mouth daily.  Dose: 75 mg     coenzyme Q10 100 mg capsule  Commonly known as: COQ10  Take 100 mg by mouth daily.  Dose: 100 mg     empagliflozin 10 mg tablet  Commonly known as: JARDIANCE  Take 10 mg by mouth daily.  Dose: 10 mg     ENTRESTO 49-51 mg per tablet  Take 1 tablet by mouth 2 (two) times a day.  Dose: 1 tablet  Generic drug: sacubitriL-valsartan     levothyroxine 100 mcg tablet  Commonly known as: SYNTHROID  Take 100 mcg by mouth daily.  Dose: 100 mcg     metoprolol succinate  mg 24 hr tablet  Commonly known as: TOPROL-XL  Take 100 mg by mouth daily.  Dose: 100 mg     multivitamin tablet  Commonly known as: THERAGRAN  Take by mouth daily.     sildenafiL (pulm.hypertension) 20 mg tablet  Commonly known as: REVATIO  Take 20 mg by mouth 3 (three) times a day.  Dose: 20 mg     simvastatin 10 mg tablet  Commonly known as: ZOCOR  Take 10 mg by mouth nightly.  Dose: 10 mg     spironolactone 25 mg tablet  Commonly known as: ALDACTONE  Take 25 mg by mouth daily.  Dose: 25 mg     torsemide 10 mg tablet  Commonly known as: DEMADEX  Take 10-20 mg by mouth daily. 10 mg if weight is 176-181 lbs, 20 mg if weight is >181 lbs  Dose: 10-20 mg          Instructions  for after discharge     Activity:  As Tolerated      Admission H&P Valid:  Yes      Change dressing      Change dressing to ano-rectal region DAILY with santyl to wound bed and dakins soaked kerlix packing  Change dressing to penile region daily with wound cleanser followed by silvadene    Discharge Condtion:  Stabilized      Discharge Potential:  Length of Stay < 30 Days      Discharge Summary:  Enclosed      Discharge tube feeding      Tube Feeding Formula (Discharge): Isosource 1.5    Administration Type: Intermittent    Tube Feeding Intermittent rate (mL/hr): 55    Run Over (Hours): 22    Flush type: Water    Flush frequency: Every 1 hour    Flush amount (mL): 40    Follow Up Appointments:      · Please follow up with Dr. Madonna Dent MD in endocrinology for diabetes management. You may reach her at [phone].   o Dr. Dent's office has started you on insulin with your tube feeds to help control your blood sugar  · Please follow up with electrophysiology with Dr. Lola DO at [facility] for continued management of your atrial fibrillation. Their office may be reached at [phone].  · Please follow up with neurology with Dr. Anthony Brooks DO at [facility] for continued management of your subacute stroke. Their office may be reached at [phone].  · Please follow up with Urology with Dr. Maximino Martinez DO at [facility] for evaluation of your penile wound in 1-2 weeks.  Their office may be reached at [phone]    Free of Communicable Disease:  No      Level of Care:  Skilled      Occupational Therapy Eval and Treat      Patient Aware of Diagnosis: Yes      Physical Therapy Eval and Treat      Rehab Potential:  Good      Speech Therapy Eval and Treat      Treatment Options: DNI      Interventions:  Cardiac  Ventilation       Cardiac:  No Chest Compressions  No Defibrillation/Cardioversion       Ventilation: No Mechanical Ventilation/Intubation    Vital Signs Per Facility  Protocol            Outpatient Follow-Ups            In 1 week [facility] FELLOW [facility] General Cardiology at [facility]        Referrals:  No orders of the defined types were placed in this encounter.      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, hyperglycemia, swallowing dysfunction  What is Needed: Outpatient follow-up with cardiology, urology, endocrine, and neurology      Test Results Pending at Discharge  Unresulted Labs (From admission, onward)             Start     Ordered    08/13/22 0600  Magnesium  Daily      Question:  Release to patient  Answer:  Immediate   Start Status   08/20/22 0600 Scheduled   08/21/22 0600 Scheduled   08/22/22 0600 Scheduled       08/12/22 1718    08/13/22 0600  Phosphorus  Daily      Question:  Release to patient  Answer:  Immediate   Start Status   08/20/22 0600 Scheduled   08/21/22 0600 Scheduled   08/22/22 0600 Scheduled       08/12/22 1718    08/13/22 0600  Protime-INR  Daily      Question:  Release to patient  Answer:  Immediate   Start Status   08/20/22 0600 Scheduled   08/21/22 0600 Scheduled   08/22/22 0600 Scheduled       08/12/22 1718    08/13/22 0600  CBC  Daily      Question:  Release to patient  Answer:  Immediate   Start Status   08/20/22 0600 Scheduled   08/21/22 0600 Scheduled   08/22/22 0600 Scheduled       08/12/22 1718    08/11/22 0600  Comprehensive metabolic panel  Daily      Question:  Release to patient  Answer:  Immediate   Start Status   08/20/22 0600 Scheduled   08/21/22 0600 Scheduled   08/22/22 0600 Scheduled   08/23/22 0600 Scheduled       08/10/22 1009    08/09/22 0600  Blood Gas, arterial Comprehensive  Daily      Question:  Release to patient  Answer:  Immediate   Start Status     08/13/22 0600 Needs to be Collected Details   08/14/22 0600 Needs to be Collected Details   08/15/22 0600 Needs to be Collected Details   08/16/22 0600 Needs to be Collected Details   08/17/22  0600 Needs to be Collected Details   08/18/22 0600 Needs to be Collected Details   08/19/22 0600 Needs to be Collected Details   08/20/22 0600 Scheduled    08/21/22 0600 Scheduled    08/22/22 0600 Scheduled    08/23/22 0600 Scheduled    08/24/22 0600 Scheduled        08/09/22 0537    08/05/22 0600  Calcium, ionized  Daily      Question:  Release to patient  Answer:  Immediate   Start Status   08/20/22 0600 Scheduled   08/21/22 0600 Scheduled   08/22/22 0600 Scheduled   08/23/22 0600 Scheduled       08/04/22 0529                Code Status at Discharge: DNR (A.N.D.)  Physician Order for Life-Sustaining Treatment Document Status      No documents found

## 2022-08-19 NOTE — PROGRESS NOTES
Endocrinology Progress Note       SUBJECTIVE   Interval History:    Patient was seen in the afternoon.  Is alert and awake.  Tolerating tube feeding.   NPO now.        OBJECTIVE      Vital signs in last 24 hours:  Temp:  [36.3 °C (97.3 °F)-37.1 °C (98.7 °F)] 36.6 °C (97.9 °F)  Heart Rate:  [85-99] 90  Resp:  [18] 18  BP: ()/(53-92) 106/53      PHYSICAL EXAMINATION        Constitutional: well-developed. well- nourished. Appears lethargic  Eyes: Conjunctivae and EOM are normal. PERRL  Neck: Normal range of motion. Neck supple.    Neurological: Alert and awake      LABS / IMAGING / TELE      Labs    Lab Results   Component Value Date    GLUCOSE 168 (H) 08/19/2022    CALCIUM 7.3 (L) 08/19/2022     (L) 08/19/2022    K 3.8 08/19/2022    CO2 24 08/19/2022     08/19/2022    BUN 26 (H) 08/19/2022    CREATININE 0.8 08/19/2022     Lab Results   Component Value Date    HGBA1C 6.3 (H) 07/23/2022     Lab Results   Component Value Date    CHOL 94 07/23/2022     Lab Results   Component Value Date    HDL 23 (L) 07/23/2022     Lab Results   Component Value Date    LDLCALC 63 07/23/2022     Lab Results   Component Value Date    TRIG 41 07/23/2022     No results found for: CHOLHDL  Lab Results   Component Value Date    ALT 43 08/19/2022     (H) 08/19/2022    ALKPHOS 179 (H) 08/19/2022    BILITOT 1.1 08/19/2022      Latest Reference Range & Units Most Recent   TSH 0.34 - 5.60 mIU/L 9.96 (H)  08/14/22 05:44   Free T4 0.58 - 1.64 ng/dL 0.25 (L)  08/14/22 05:44   (H): Data is abnormally high  (L): Data is abnormally low      ASSESSMENT AND PLAN      1 History of preDM  - evidenced with A1c 6.3. on Jardiance 10 mg PTA    - was on oral for 1-2 days. NPO now.   - on TF isosource 1.5 at 50 ml/hr.      - RI 4 units q 6 for TF  - RI ss q 6h if needed.      2 necrotizing fasciitis  This occurred in the context of SGLT2 inhibitor use for heart failure  Jardiance has been discontinued.  He should not receive any further  treatment with medications in this class going forward.    3 Hypothyroidism  - Was on LT4 100 mcg daily PTA.    - fabrizio TSH due to missing LT4.   - LT4 was restarted 8/15.   - Repeat TFT in 1 week to ensure good absorption.     4, History of CHF - mgt per cardiology service    Thank you very much for allowing me participating in this patient's care. Please feel free to contact me if any questions.          Madonna Dent MD

## 2022-08-19 NOTE — PROGRESS NOTES
Patient:  Samy Elena  Location:  [place] [address]  MRN:  [id number]  Today's date:  8/19/2022     General Observations  Start: Pt received supine in bed; he was agreeable to therapy  End: Pt reclined in chair at end of session; call bell in reach, chair alarm activated, all needs met, personal items in reach and nurse notified of patient performance and patient's position      Samy is a 89 y.o. 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    Past Medical History  Samy has a past medical history of Atrial fibrillation (CMS/HCC), Disease of thyroid gland, GI (gastrointestinal bleed), Hypertension, and Myocardial infarction (CMS/HCC).    History of Present Illness   Admit from OP MD's office 8/2 for denise's gangrene. s/p EUA wound debridement of gluteal NSTI on 8/2 and diverting loop sigmoid colostomy on 8/3  He was then admitted to the surgical ICU where he was intubated and had transient pressor requirements. ETT 8/3-8/12, extubated 8/13. Cleared for thick liquids and puree.    C/f stroke 8/17, MRI negative for acute infarct or hemorrhage      OT Vitals    Date/Time Pulse SpO2 Pt Activity O2 Therapy BP Pt Position Observations [facility]   08/19/22 1133 90 94 % At rest None (Room air) 116/92 Lying --    08/19/22 1155 -- -- -- -- 81/69 Sitting asymptomatic    08/19/22 1158 -- -- -- -- 126/63 -- reclined in chair CH      OT Pain    Date/Time Rating: Rest Rating: Activity [facility]   08/19/22 1133 0 0 CH          Prior Living Environment    Flowsheet Row Most Recent Value   Living Environment Comment Lives alone in 2 SH with AK on first floor and bed/bath upstairs. Tub shower. Sons installed stair glide last week.        Prior Level of Function    Flowsheet Row Most Recent Value   Dominant Hand right   Eating independent   Communication difficulty speaking (not related to language barrier)  [per RN, pt's famiyl  reports dysarthria PTA]   Swallowing difficulty swallowing foods   Baseline Diet/Method of Nutritional Intake no diet restrictions   Past History of Dysphagia Pt with no hx of dysphagia prior to admission   Prior Level of Function Comment Mod(i) for BADLs and mobility using RW. Has  for meals and IADLs. Sons assist PRN.   Assistive Device Currently Used at Home cane, straight, walker, front-wheeled        Occupational Profile    Flowsheet Row Most Recent Value   Reason for Services/Referral new onset of L-sided weakness,  MRI (-)   Patient Goals to go home           OT Evaluation and Treatment - 08/19/22 1133        OT Time Calculation    Start Time 1133     Stop Time 1158     Time Calculation (min) 25 min        Session Details    Document Type re-evaluation     Mode of Treatment occupational therapy        General Information    Patient Profile Reviewed yes     Onset of Illness/Injury or Date of Surgery 08/16/22     Referring Physician Dr. Bryant     Patient/Family/Caregiver Comments/Observations RN cleared pt for OT re-eval     General Observations of Patient Pt rec'd asleep in bed     Existing Precautions/Restrictions fall;aspiration;contact;droplet     Limitations/Impairments safety/cognitive;hearing;swallowing        Cognition/Psychosocial    Affect/Mental Status (Cognition) low arousal/lethargic     Behavioral Issues (Cognition) withdrawn     Orientation Status (Cognition) oriented x 3     Follows Commands (Cognition) follows one-step commands;increased processing time needed;delayed response/completion     Comment, Cognition Pleasant and cooperative.  He is able to follow 1-step commands and is eager to get OOB.  He demonstrates appropriate conversation, however, responses are delayed.        Strength (Manual Muscle Testing)    Shoulder, Left (Strength) 2+     Elbow, Left (Strength) 2-     Wrist, Left (Strength) 2+     Hand, Left (Strength) 2+     Shoulder, Right (Strength) 2     Elbow, Right  (Strength) 3-     Wrist, Right (Strength) 3     Hand, Right (Strength) 3        Bed Mobility    [place], Supine to Sit maximum assist (25-49% patient effort);2 person assist     Assistive Device bed rails;draw sheet;head of bed elevated     Comment (Bed Mobility) Cueing for sequencing of tasks        Bed to Chair Transfer    [place], Bed to Chair maximum assist (25-49% patient effort);2 person assist     Verbal Cues hand placement;safety;technique     Assistive Device --   HHA x2       Sit to Stand Transfer    [place], Sit to Stand Transfer maximum assist (25-49% patient effort);2 person assist     Verbal Cues hand placement;safety;technique     Assistive Device --   HHAx2    Comment Assistance for b/l knee and foot blocking        Stand to Sit Transfer    [place], Stand to Sit Transfer maximum assist (25-49% patient effort);2 person assist     Verbal Cues hand placement;safety;technique     Assistive Device --   HHA x2       Safety Issues, Functional Mobility    Impairments Affecting Function (Mobility) balance;endurance/activity tolerance;grasp;strength        Balance    Static Sitting Balance mild impairment     Dynamic Sitting Balance moderate impairment     Static Standing Balance severe impairment     Dynamic Standing Balance severe impairment     Comment, Balance Close SUP/Min A to maintain EOB sitting balance.        Upper Body Dressing    Tasks hospital gown     [place] moderate assist (50-74% patient effort)     Position sitting up in bed     Adaptive Equipment none     Comment Completed while sitting up in bed to reduce physical demand to maintain sitting balance.  Assistance to unthread/thread BUEs into sleeves.        Lower Body Dressing    Tasks socks     [place] increased time to complete     Position supine     Adaptive Equipment none     Comment Unable to complete figure-4 position        Grooming    Self-Performance washes, rinses and dries face     [place]  minimum assist (75% or more patient effort)     Position supported sitting     Setup Assistance obtain supplies     Adaptive Equipment none     Comment Pt able to use R hand to clean chin, mouth, and b/l cheeks.  Assistance to wash forehead 2/2 shoulder weakness.        AM-PAC (TM) - ADL (Current Function)    Putting on and taking off regular lower body clothing? 1 - Total     Bathing? 2 - A Lot     Toileting? 1 - Total     Putting on/taking off regular upper body clothing? 2 - A Lot     How much help for taking care of personal grooming? 3 - A Little     Eating meals? 1 - Total     AM-PAC (TM) ADL Score 10        Assessment/Plan (OT)    Daily Outcome Statement OT re-eval completed 2/2 new onset of L-sided weakness, MRI (-). Max A x2 for all bed mobility and transfers with HHAx2.  Improvement noted with grooming tasks as pt is able to wash face with Min A and doff/don front lying gown with Mod A.  Goals revised as needed this date.  Cont to follow and rec SNF.     Rehab Potential good, to achieve stated therapy goals     Therapy Frequency 3-5 times/wk               OT Assessment/Plan    Flowsheet Row Most Recent Value   OT Recommended Discharge Disposition skilled nursing facility at 08/19/2022 1133   Anticipated Equipment Needs At Discharge (OT) --  [TBD at next level of care] at 08/19/2022 1133   Patient/Family Therapy Goal Statement to get stronger at 08/16/2022 0949               OT Goals    Flowsheet Row Most Recent Value   Bed Mobility Goal 1    Activity/Assistive Device bed mobility activities, all at 08/16/2022 0949   [place] minimum assist (75% or more patient effort) at 08/16/2022 0949   Time Frame by discharge at 08/16/2022 0949   Transfer Goal 1    Activity/Assistive Device all transfers at 08/16/2022 0949   [place] minimum assist (75% or more patient effort) at 08/16/2022 0949   Time Frame by discharge at 08/16/2022 0949   Dressing Goal 1    Activity/Adaptive Equipment dressing skills, all at  08/16/2022 0949   [place] minimum assist (75% or more patient effort) at 08/16/2022 0949   Time Frame by discharge at 08/16/2022 0949   Strategies/Barriers using AD PRN at 08/16/2022 0949   Toileting Goal 1    Activity/Assistive Device toileting skills, all at 08/16/2022 0949   [place] minimum assist (75% or more patient effort) at 08/16/2022 0949   Time Frame by discharge at 08/16/2022 0949   Strategies/Barriers via commode at 08/16/2022 0949   Grooming Goal 1    Activity/Assistive Device grooming skills, all at 08/19/2022 1133   [place] supervision required at 08/19/2022 1133   Time Frame by discharge at 08/19/2022 1133   Progress/Outcome goal revised this date at 08/19/2022 1133

## 2022-08-20 LAB
ALBUMIN SERPL-MCNC: 1.5 G/DL (ref 3.4–5)
ALP SERPL-CCNC: 187 IU/L (ref 35–126)
ALT SERPL-CCNC: 45 IU/L (ref 16–63)
ANION GAP SERPL CALC-SCNC: 5 MEQ/L (ref 3–15)
AST SERPL-CCNC: 121 IU/L (ref 15–41)
BILIRUB SERPL-MCNC: 0.5 MG/DL (ref 0.3–1.2)
BUN SERPL-MCNC: 25 MG/DL (ref 8–20)
CA-I BLD-SCNC: 1.1 MMOL/L (ref 1.15–1.27)
CALCIUM SERPL-MCNC: 7.6 MG/DL (ref 8.9–10.3)
CHLORIDE SERPL-SCNC: 107 MEQ/L (ref 98–109)
CO2 SERPL-SCNC: 25 MEQ/L (ref 22–32)
CREAT SERPL-MCNC: 0.8 MG/DL (ref 0.8–1.3)
ERYTHROCYTE [DISTWIDTH] IN BLOOD BY AUTOMATED COUNT: 16.4 % (ref 11.6–14.4)
GFR SERPL CREATININE-BSD FRML MDRD: >60 ML/MIN/1.73M*2
GLUCOSE BLD-MCNC: 114 MG/DL (ref 70–99)
GLUCOSE BLD-MCNC: 123 MG/DL (ref 70–99)
GLUCOSE BLD-MCNC: 129 MG/DL (ref 70–99)
GLUCOSE BLD-MCNC: 181 MG/DL (ref 70–99)
GLUCOSE SERPL-MCNC: 129 MG/DL (ref 70–99)
HCT VFR BLDCO AUTO: 32.1 % (ref 40.1–51)
HGB BLD-MCNC: 10.6 G/DL (ref 13.7–17.5)
INR PPP: 1.2
MAGNESIUM SERPL-MCNC: 2 MG/DL (ref 1.8–2.5)
MCH RBC QN AUTO: 33.9 PG (ref 28–33.2)
MCHC RBC AUTO-ENTMCNC: 33 G/DL (ref 32.2–36.5)
MCV RBC AUTO: 102.6 FL (ref 83–98)
PDW BLD AUTO: 10.5 FL (ref 9.4–12.4)
PHOSPHATE SERPL-MCNC: 2.6 MG/DL (ref 2.4–4.7)
PLATELET # BLD AUTO: 163 K/UL (ref 150–350)
POCT TEST: ABNORMAL
POTASSIUM SERPL-SCNC: 3.4 MEQ/L (ref 3.6–5.1)
PROT SERPL-MCNC: 5.8 G/DL (ref 6–8.2)
PROTHROMBIN TIME: 15.3 SEC (ref 12.2–14.5)
RBC # BLD AUTO: 3.13 M/UL (ref 4.5–5.8)
SODIUM SERPL-SCNC: 137 MEQ/L (ref 136–144)
WBC # BLD AUTO: 7.43 K/UL (ref 3.8–10.5)

## 2022-08-20 PROCEDURE — 99024 POSTOP FOLLOW-UP VISIT: CPT | Performed by: SURGERY

## 2022-08-20 PROCEDURE — 21400000 HC ROOM AND CARE CCU/INTERMEDIATE

## 2022-08-20 PROCEDURE — 84100 ASSAY OF PHOSPHORUS: CPT | Performed by: STUDENT IN AN ORGANIZED HEALTH CARE EDUCATION/TRAINING PROGRAM

## 2022-08-20 PROCEDURE — 63700000 HC SELF-ADMINISTRABLE DRUG: Performed by: PHYSICIAN ASSISTANT

## 2022-08-20 PROCEDURE — 82330 ASSAY OF CALCIUM: CPT | Performed by: SURGERY

## 2022-08-20 PROCEDURE — 80053 COMPREHEN METABOLIC PANEL: CPT | Performed by: STUDENT IN AN ORGANIZED HEALTH CARE EDUCATION/TRAINING PROGRAM

## 2022-08-20 PROCEDURE — 63700000 HC SELF-ADMINISTRABLE DRUG: Performed by: SURGERY

## 2022-08-20 PROCEDURE — 63600000 HC DRUGS/DETAIL CODE

## 2022-08-20 PROCEDURE — 36415 COLL VENOUS BLD VENIPUNCTURE: CPT | Performed by: SURGERY

## 2022-08-20 PROCEDURE — 85610 PROTHROMBIN TIME: CPT | Performed by: STUDENT IN AN ORGANIZED HEALTH CARE EDUCATION/TRAINING PROGRAM

## 2022-08-20 PROCEDURE — 83735 ASSAY OF MAGNESIUM: CPT | Performed by: STUDENT IN AN ORGANIZED HEALTH CARE EDUCATION/TRAINING PROGRAM

## 2022-08-20 PROCEDURE — 63600000 HC DRUGS/DETAIL CODE: Performed by: SURGERY

## 2022-08-20 PROCEDURE — 85027 COMPLETE CBC AUTOMATED: CPT | Performed by: STUDENT IN AN ORGANIZED HEALTH CARE EDUCATION/TRAINING PROGRAM

## 2022-08-20 RX ORDER — POTASSIUM CHLORIDE 14.9 MG/ML
20 INJECTION INTRAVENOUS ONCE
Status: COMPLETED | OUTPATIENT
Start: 2022-08-20 | End: 2022-08-20

## 2022-08-20 RX ADMIN — HEPARIN SODIUM 5000 UNITS: 5000 INJECTION, SOLUTION INTRAVENOUS; SUBCUTANEOUS at 05:49

## 2022-08-20 RX ADMIN — COLLAGENASE SANTYL 1 APPLICATION.: 250 OINTMENT TOPICAL at 09:46

## 2022-08-20 RX ADMIN — LEVOTHYROXINE SODIUM 100 MCG: 0.1 TABLET ORAL at 06:46

## 2022-08-20 RX ADMIN — DAKIN'S SOLUTION 0.125% (QUARTER STRENGTH): 0.12 SOLUTION at 09:46

## 2022-08-20 RX ADMIN — CLOPIDOGREL BISULFATE 75 MG: 75 TABLET ORAL at 09:42

## 2022-08-20 RX ADMIN — INSULIN HUMAN 4 UNITS: 100 INJECTION, SOLUTION PARENTERAL at 05:48

## 2022-08-20 RX ADMIN — ATORVASTATIN CALCIUM 40 MG: 40 TABLET, FILM COATED ORAL at 18:24

## 2022-08-20 RX ADMIN — ASPIRIN 81 MG: 81 TABLET, CHEWABLE ORAL at 09:41

## 2022-08-20 RX ADMIN — INSULIN HUMAN 4 UNITS: 100 INJECTION, SOLUTION PARENTERAL at 18:19

## 2022-08-20 RX ADMIN — METOPROLOL TARTRATE 25 MG: 25 TABLET, FILM COATED ORAL at 09:42

## 2022-08-20 RX ADMIN — INSULIN HUMAN 4 UNITS: 100 INJECTION, SOLUTION PARENTERAL at 12:58

## 2022-08-20 RX ADMIN — POTASSIUM CHLORIDE 20 MEQ: 14.9 INJECTION, SOLUTION INTRAVENOUS at 15:43

## 2022-08-20 RX ADMIN — TORSEMIDE 10 MG: 20 TABLET ORAL at 09:41

## 2022-08-20 RX ADMIN — INSULIN HUMAN 4 UNITS: 100 INJECTION, SOLUTION PARENTERAL at 00:15

## 2022-08-20 RX ADMIN — HEPARIN SODIUM 5000 UNITS: 5000 INJECTION, SOLUTION INTRAVENOUS; SUBCUTANEOUS at 22:22

## 2022-08-20 RX ADMIN — POTASSIUM CHLORIDE 20 MEQ: 14.9 INJECTION, SOLUTION INTRAVENOUS at 18:18

## 2022-08-20 RX ADMIN — HEPARIN SODIUM 5000 UNITS: 5000 INJECTION, SOLUTION INTRAVENOUS; SUBCUTANEOUS at 15:46

## 2022-08-20 RX ADMIN — LANSOPRAZOLE 30 MG: KIT at 09:41

## 2022-08-20 NOTE — PLAN OF CARE
Plan of Care Review  Plan of Care Reviewed With: patient  Progress: no change  Outcome Summary: VSS. DHT in place with tube feeds infusing. perry in place. sacral wound changed.

## 2022-08-20 NOTE — PROGRESS NOTES
General Surgery Daily Progress Note    Subjective     Interval History:   Patient transferred to stepdown from SICU.  Pt was COVID swabbed in anticipation of tentative PEG placement (failed recent video swallow w/ silent aspirations) and tested positive (last neg COVID test was 8/2/22). Patient has had mild productive cough, reports continues to improve. Denies fever/chills, SOB. Getting TFs via DHT without nausea/vomiting, abdominal pain or distention. Denies any pain along perirectal/buttock region at site of wound debridement.       Objective     Vital signs in last 24 hours:  Temp:  [36.2 °C (97.1 °F)-36.8 °C (98.3 °F)] 36.8 °C (98.3 °F)  Heart Rate:  [] 84  Resp:  [18] 18  BP: ()/(53-92) 106/58      Intake/Output Summary (Last 24 hours) at 8/20/2022 0904  Last data filed at 8/20/2022 0600  Gross per 24 hour   Intake 720 ml   Output 1725 ml   Net -1005 ml     Intake/Output this shift:  No intake/output data recorded.    Physical Exam    General appearance: A&O x3, comfortable, in good spirits and cheerful  ENT: DHT secured at nares  Neck: no JVD  Lungs: Normal respiratory effort, 95% on room air. No cough appreciated  Heart: Afib, regular rate  Abdomen: stoma pink and protruding, productive for gas and thickish brown stool. Abdomen soft, distended, non-tender and not tympanitic.   Extremities: extremities normal, warm and well-perfused; no cyanosis, clubbing, or edema  Pulses: 2+ and symmetric  Skin: Skin color, texture, turgor normal. No rashes or lesions  Neurologic: Grossly normal    VTE Assessment: [facility]    Labs  Results from last 7 days   Lab Units 08/20/22  0754 08/19/22  1244 08/18/22  0524   WBC K/uL 7.43 8.46 8.51   HEMOGLOBIN g/dL 10.6* 10.7* 9.8*   HEMATOCRIT % 32.1* 31.9* 29.2*   PLATELETS K/uL 163 159 113*     Results from last 7 days   Lab Units 08/19/22  1244 08/18/22  0524 08/17/22  2133 08/17/22  0322   SODIUM mEQ/L 134* 137 138 142   POTASSIUM mEQ/L 3.8 3.6 3.7 3.5*   CHLORIDE mEQ/L  108 108 109 111*   CO2 mEQ/L 24 24 24 25   BUN mg/dL 26* 27* 27* 28*   CREATININE mg/dL 0.8 0.7* 0.7* 0.8   CALCIUM mg/dL 7.3* 7.6* 7.7* 8.0*   ALBUMIN g/dL 1.5* 1.3*  --  1.5*   BILIRUBIN TOTAL mg/dL 1.1 0.9  --  0.7   ALK PHOS IU/L 179* 123  --  113   ALT IU/L 43 33  --  35   AST IU/L 121* 87*  --  91*   GLUCOSE mg/dL 168* 166* 192* 221*         Imaging  No new imaging from the prior 24hrs      Assessment/Plan      89 y/o male with PMH of biventricular heart failure, HTN, Afib, CAD who is now s/p EUA wound debridement of gluteal NSTI on 8/2 and diverting loop sigmoid colostomy on 8/3.    Extubated on 8/13  Now COVID positive 8/18, asymptomatic    - Continue TF @ goal, transitioned to IsoSource yesterday per nutrition  - Consider out-patient PEG once patient has recovered from current illness / operations if unable to return to normal po intake despite aggressive SLP therapy  -F/u neurology, suspect subacute infarct as source of dsyarthria. Continue ASA and statin therapy. No further w/u needed.  -F/u cardiology - resumed home dose of torsemide 10mg daily  -F/u ENT - no acute intervention.   -F/u Endocrine recs regarding insulin management.and TFs  -Encourage ISS and pulmonary toilet.  -PT/OT   -WOCN for new ostomy teaching   -Continue BID packing changes to wound with santyl and dakins.   - Case management following for SNF placement, [facility] following and will accept patient with DHT. CM to f/u regarding new COVID dx    Arnav Ny MD   General Surgery  Please page [facility] service [phone] with any questions/concerns.

## 2022-08-21 LAB
ALBUMIN SERPL-MCNC: 1.7 G/DL (ref 3.4–5)
ALP SERPL-CCNC: 212 IU/L (ref 35–126)
ALT SERPL-CCNC: 45 IU/L (ref 16–63)
ANION GAP SERPL CALC-SCNC: 8 MEQ/L (ref 3–15)
AST SERPL-CCNC: 124 IU/L (ref 15–41)
BILIRUB SERPL-MCNC: 1 MG/DL (ref 0.3–1.2)
BUN SERPL-MCNC: 28 MG/DL (ref 8–20)
CA-I BLD-SCNC: 1.09 MMOL/L (ref 1.15–1.27)
CALCIUM SERPL-MCNC: 7.7 MG/DL (ref 8.9–10.3)
CHLORIDE SERPL-SCNC: 104 MEQ/L (ref 98–109)
CO2 SERPL-SCNC: 24 MEQ/L (ref 22–32)
CREAT SERPL-MCNC: 0.8 MG/DL (ref 0.8–1.3)
ERYTHROCYTE [DISTWIDTH] IN BLOOD BY AUTOMATED COUNT: 17 % (ref 11.6–14.4)
GFR SERPL CREATININE-BSD FRML MDRD: >60 ML/MIN/1.73M*2
GLUCOSE BLD-MCNC: 111 MG/DL (ref 70–99)
GLUCOSE BLD-MCNC: 120 MG/DL (ref 70–99)
GLUCOSE BLD-MCNC: 153 MG/DL (ref 70–99)
GLUCOSE BLD-MCNC: 181 MG/DL (ref 70–99)
GLUCOSE SERPL-MCNC: 157 MG/DL (ref 70–99)
HCT VFR BLDCO AUTO: 33.9 % (ref 40.1–51)
HGB BLD-MCNC: 11.4 G/DL (ref 13.7–17.5)
INR PPP: 1.2
MAGNESIUM SERPL-MCNC: 2 MG/DL (ref 1.8–2.5)
MCH RBC QN AUTO: 34.9 PG (ref 28–33.2)
MCHC RBC AUTO-ENTMCNC: 33.6 G/DL (ref 32.2–36.5)
MCV RBC AUTO: 103.7 FL (ref 83–98)
PDW BLD AUTO: 10.2 FL (ref 9.4–12.4)
PHOSPHATE SERPL-MCNC: 2.8 MG/DL (ref 2.4–4.7)
PLATELET # BLD AUTO: 170 K/UL (ref 150–350)
POCT TEST: ABNORMAL
POTASSIUM SERPL-SCNC: 3.9 MEQ/L (ref 3.6–5.1)
PROT SERPL-MCNC: 6 G/DL (ref 6–8.2)
PROTHROMBIN TIME: 14.8 SEC (ref 12.2–14.5)
RBC # BLD AUTO: 3.27 M/UL (ref 4.5–5.8)
SODIUM SERPL-SCNC: 136 MEQ/L (ref 136–144)
WBC # BLD AUTO: 6.77 K/UL (ref 3.8–10.5)

## 2022-08-21 PROCEDURE — 82330 ASSAY OF CALCIUM: CPT | Performed by: SURGERY

## 2022-08-21 PROCEDURE — 85027 COMPLETE CBC AUTOMATED: CPT | Performed by: STUDENT IN AN ORGANIZED HEALTH CARE EDUCATION/TRAINING PROGRAM

## 2022-08-21 PROCEDURE — 99024 POSTOP FOLLOW-UP VISIT: CPT | Performed by: SURGERY

## 2022-08-21 PROCEDURE — 25000000 HC PHARMACY GENERAL: Performed by: SURGERY

## 2022-08-21 PROCEDURE — 63700000 HC SELF-ADMINISTRABLE DRUG: Performed by: PHYSICIAN ASSISTANT

## 2022-08-21 PROCEDURE — 85610 PROTHROMBIN TIME: CPT | Performed by: STUDENT IN AN ORGANIZED HEALTH CARE EDUCATION/TRAINING PROGRAM

## 2022-08-21 PROCEDURE — 25800000 HC PHARMACY IV SOLUTIONS

## 2022-08-21 PROCEDURE — 63600000 HC DRUGS/DETAIL CODE

## 2022-08-21 PROCEDURE — 84100 ASSAY OF PHOSPHORUS: CPT | Performed by: STUDENT IN AN ORGANIZED HEALTH CARE EDUCATION/TRAINING PROGRAM

## 2022-08-21 PROCEDURE — 25000000 HC PHARMACY GENERAL

## 2022-08-21 PROCEDURE — 63700000 HC SELF-ADMINISTRABLE DRUG: Performed by: STUDENT IN AN ORGANIZED HEALTH CARE EDUCATION/TRAINING PROGRAM

## 2022-08-21 PROCEDURE — 80053 COMPREHEN METABOLIC PANEL: CPT | Performed by: STUDENT IN AN ORGANIZED HEALTH CARE EDUCATION/TRAINING PROGRAM

## 2022-08-21 PROCEDURE — 36415 COLL VENOUS BLD VENIPUNCTURE: CPT | Performed by: SURGERY

## 2022-08-21 PROCEDURE — 83735 ASSAY OF MAGNESIUM: CPT | Performed by: STUDENT IN AN ORGANIZED HEALTH CARE EDUCATION/TRAINING PROGRAM

## 2022-08-21 PROCEDURE — 63700000 HC SELF-ADMINISTRABLE DRUG: Performed by: SURGERY

## 2022-08-21 PROCEDURE — 63600000 HC DRUGS/DETAIL CODE: Performed by: SURGERY

## 2022-08-21 PROCEDURE — 21400000 HC ROOM AND CARE CCU/INTERMEDIATE

## 2022-08-21 RX ORDER — POTASSIUM CHLORIDE 14.9 MG/ML
20 INJECTION INTRAVENOUS ONCE
Status: COMPLETED | OUTPATIENT
Start: 2022-08-21 | End: 2022-08-21

## 2022-08-21 RX ADMIN — ASPIRIN 81 MG: 81 TABLET, CHEWABLE ORAL at 09:53

## 2022-08-21 RX ADMIN — HEPARIN SODIUM 5000 UNITS: 5000 INJECTION, SOLUTION INTRAVENOUS; SUBCUTANEOUS at 13:08

## 2022-08-21 RX ADMIN — HEPARIN SODIUM 5000 UNITS: 5000 INJECTION, SOLUTION INTRAVENOUS; SUBCUTANEOUS at 21:12

## 2022-08-21 RX ADMIN — COLLAGENASE SANTYL 1 APPLICATION.: 250 OINTMENT TOPICAL at 09:54

## 2022-08-21 RX ADMIN — DAKIN'S SOLUTION 0.125% (QUARTER STRENGTH): 0.12 SOLUTION at 09:54

## 2022-08-21 RX ADMIN — INSULIN HUMAN 4 UNITS: 100 INJECTION, SOLUTION PARENTERAL at 06:34

## 2022-08-21 RX ADMIN — SODIUM PHOSPHATE, MONOBASIC, MONOHYDRATE 10 MMOL: 276; 142 INJECTION, SOLUTION INTRAVENOUS at 21:07

## 2022-08-21 RX ADMIN — ATORVASTATIN CALCIUM 40 MG: 40 TABLET, FILM COATED ORAL at 17:58

## 2022-08-21 RX ADMIN — INSULIN HUMAN 4 UNITS: 100 INJECTION, SOLUTION PARENTERAL at 13:07

## 2022-08-21 RX ADMIN — HEPARIN SODIUM 5000 UNITS: 5000 INJECTION, SOLUTION INTRAVENOUS; SUBCUTANEOUS at 06:29

## 2022-08-21 RX ADMIN — LEVOTHYROXINE SODIUM 100 MCG: 0.1 TABLET ORAL at 06:29

## 2022-08-21 RX ADMIN — POTASSIUM CHLORIDE 20 MEQ: 14.9 INJECTION, SOLUTION INTRAVENOUS at 16:53

## 2022-08-21 RX ADMIN — INSULIN HUMAN 4 UNITS: 100 INJECTION, SOLUTION PARENTERAL at 23:50

## 2022-08-21 RX ADMIN — INSULIN HUMAN 4 UNITS: 100 INJECTION, SOLUTION PARENTERAL at 00:47

## 2022-08-21 RX ADMIN — CLOPIDOGREL BISULFATE 75 MG: 75 TABLET ORAL at 09:54

## 2022-08-21 RX ADMIN — INSULIN HUMAN 4 UNITS: 100 INJECTION, SOLUTION PARENTERAL at 17:56

## 2022-08-21 RX ADMIN — LANSOPRAZOLE 30 MG: KIT at 09:53

## 2022-08-21 RX ADMIN — TORSEMIDE 10 MG: 20 TABLET ORAL at 09:54

## 2022-08-21 NOTE — PLAN OF CARE
Problem: Adult Inpatient Plan of Care  Goal: Patient-Specific Goal (Individualized)  Outcome: Progressing     Problem: Adult Inpatient Plan of Care  Goal: Absence of Hospital-Acquired Illness or Injury  Outcome: Progressing     Problem: Adult Inpatient Plan of Care  Goal: Optimal Comfort and Wellbeing  Outcome: Progressing     Problem: Adult Inpatient Plan of Care  Goal: Readiness for Transition of Care  Outcome: Progressing     Problem: Skin Injury Risk Increased  Goal: Skin Health and Integrity  Outcome: Progressing   Plan of Care Review  Plan of Care Reviewed With: patient  Outcome Summary: Pt VSS. On RA no respiratory distress noted. Receiving TF with flushes. Sacral wound dressing changed. Will cath and ostomy in placed. Resting comfortably, bed alarm activated, call bell within reach.

## 2022-08-21 NOTE — PROGRESS NOTES
General Surgery Daily Progress Note    Subjective     No acute events overnight.  Denies pain, nausea, vomiting.  Is out of bed and ambulating with assist.  Gas and stool present in ostomy bag, which put out 225 cc yesterday.  Dobbhoff tube well-placed.    Objective     Vital signs in last 24 hours:  Temp:  [36.6 °C (97.8 °F)-37 °C (98.6 °F)] 37 °C (98.6 °F)  Heart Rate:  [76-90] 90  Resp:  [18-20] 18  BP: (101-132)/(56-70) 116/60      Intake/Output Summary (Last 24 hours) at 8/21/2022 0936  Last data filed at 8/21/2022 0500  Gross per 24 hour   Intake 1925.84 ml   Output 2100 ml   Net -174.16 ml     Intake/Output this shift:  No intake/output data recorded.    Physical Exam    General appearance: A&O x3, comfortable, in good spirits and cheerful  ENT: DHT secured at nares  Neck: no JVD  Lungs: Normal respiratory effort, 95% on room air. No cough appreciated  Heart: Afib, regular rate  Abdomen: stoma pink and protruding, productive for gas and brown stool. Abdomen soft, nondistended, non-tender and not tympanitic.   Extremities: extremities normal, warm and well-perfused; no cyanosis, clubbing, or edema  Pulses: 2+ and symmetric  Skin: Skin color, texture, turgor normal. No rashes or lesions  Neurologic: Grossly normal    VTE Assessment: [facility]    Labs  Results from last 7 days   Lab Units 08/21/22  0815 08/20/22  0754 08/19/22  1244   WBC K/uL 6.77 7.43 8.46   HEMOGLOBIN g/dL 11.4* 10.6* 10.7*   HEMATOCRIT % 33.9* 32.1* 31.9*   PLATELETS K/uL 170 163 159     Results from last 7 days   Lab Units 08/20/22  0754 08/19/22  1244 08/18/22  0524   SODIUM mEQ/L 137 134* 137   POTASSIUM mEQ/L 3.4* 3.8 3.6   CHLORIDE mEQ/L 107 108 108   CO2 mEQ/L 25 24 24   BUN mg/dL 25* 26* 27*   CREATININE mg/dL 0.8 0.8 0.7*   CALCIUM mg/dL 7.6* 7.3* 7.6*   ALBUMIN g/dL 1.5* 1.5* 1.3*   BILIRUBIN TOTAL mg/dL 0.5 1.1 0.9   ALK PHOS IU/L 187* 179* 123   ALT IU/L 45 43 33   AST IU/L 121* 121* 87*   GLUCOSE mg/dL 129* 168* 166*          Imaging  No new imaging from the prior 24hrs      Assessment/Plan      89 y/o male with PMH of biventricular heart failure, HTN, Afib, CAD who is now s/p EUA wound debridement of gluteal NSTI on 8/2 and diverting loop sigmoid colostomy on 8/3.    Extubated on 8/13  Now COVID positive as of 8/18, asymptomatic    - Continue TF @ goal, IsoSource  - Consider out-patient PEG once patient has recovered from current illness / operations if unable to return to normal po intake despite aggressive SLP therapy  -F/u neurology, suspect subacute infarct as source of dsyarthria. Continue ASA and statin therapy. No further w/u needed.  -F/u cardiology  -F/u ENT - no acute intervention.   -F/u Endocrine recs regarding insulin management.and TFs  -Encourage ISS and pulmonary toilet.  -PT/OT  -Continue BID packing changes to wound with santyl and dakins.   - Case management following for SNF placement, [facility] following and will accept patient with DHT. Awaiting bed.    Joe Eduardo, DO   General Surgery  Please page [facility] service [phone] with any questions/concerns.

## 2022-08-22 LAB
ALBUMIN SERPL-MCNC: 1.6 G/DL (ref 3.4–5)
ALP SERPL-CCNC: 215 IU/L (ref 35–126)
ALT SERPL-CCNC: 43 IU/L (ref 16–63)
ANION GAP SERPL CALC-SCNC: 7 MEQ/L (ref 3–15)
AST SERPL-CCNC: 117 IU/L (ref 15–41)
BILIRUB SERPL-MCNC: 0.8 MG/DL (ref 0.3–1.2)
BUN SERPL-MCNC: 27 MG/DL (ref 8–20)
CA-I BLD-SCNC: 1.1 MMOL/L (ref 1.15–1.27)
CALCIUM SERPL-MCNC: 7.6 MG/DL (ref 8.9–10.3)
CHLORIDE SERPL-SCNC: 104 MEQ/L (ref 98–109)
CO2 SERPL-SCNC: 26 MEQ/L (ref 22–32)
CREAT SERPL-MCNC: 0.8 MG/DL (ref 0.8–1.3)
ERYTHROCYTE [DISTWIDTH] IN BLOOD BY AUTOMATED COUNT: 16.8 % (ref 11.6–14.4)
GFR SERPL CREATININE-BSD FRML MDRD: >60 ML/MIN/1.73M*2
GLUCOSE BLD-MCNC: 114 MG/DL (ref 70–99)
GLUCOSE BLD-MCNC: 139 MG/DL (ref 70–99)
GLUCOSE BLD-MCNC: 142 MG/DL (ref 70–99)
GLUCOSE SERPL-MCNC: 133 MG/DL (ref 70–99)
HCT VFR BLDCO AUTO: 31.2 % (ref 40.1–51)
HGB BLD-MCNC: 10.5 G/DL (ref 13.7–17.5)
INR PPP: 1.2
MAGNESIUM SERPL-MCNC: 1.9 MG/DL (ref 1.8–2.5)
MCH RBC QN AUTO: 34.5 PG (ref 28–33.2)
MCHC RBC AUTO-ENTMCNC: 33.7 G/DL (ref 32.2–36.5)
MCV RBC AUTO: 102.6 FL (ref 83–98)
PDW BLD AUTO: 10.2 FL (ref 9.4–12.4)
PHOSPHATE SERPL-MCNC: 3.1 MG/DL (ref 2.4–4.7)
PLATELET # BLD AUTO: 161 K/UL (ref 150–350)
POCT TEST: ABNORMAL
POTASSIUM SERPL-SCNC: 3.9 MEQ/L (ref 3.6–5.1)
PROT SERPL-MCNC: 5.7 G/DL (ref 6–8.2)
PROTHROMBIN TIME: 15.3 SEC (ref 12.2–14.5)
RBC # BLD AUTO: 3.04 M/UL (ref 4.5–5.8)
SODIUM SERPL-SCNC: 137 MEQ/L (ref 136–144)
WBC # BLD AUTO: 6.83 K/UL (ref 3.8–10.5)

## 2022-08-22 PROCEDURE — 63700000 HC SELF-ADMINISTRABLE DRUG: Performed by: PHYSICIAN ASSISTANT

## 2022-08-22 PROCEDURE — 84100 ASSAY OF PHOSPHORUS: CPT | Performed by: STUDENT IN AN ORGANIZED HEALTH CARE EDUCATION/TRAINING PROGRAM

## 2022-08-22 PROCEDURE — 85027 COMPLETE CBC AUTOMATED: CPT | Performed by: STUDENT IN AN ORGANIZED HEALTH CARE EDUCATION/TRAINING PROGRAM

## 2022-08-22 PROCEDURE — 99232 SBSQ HOSP IP/OBS MODERATE 35: CPT | Performed by: INTERNAL MEDICINE

## 2022-08-22 PROCEDURE — 80053 COMPREHEN METABOLIC PANEL: CPT | Performed by: STUDENT IN AN ORGANIZED HEALTH CARE EDUCATION/TRAINING PROGRAM

## 2022-08-22 PROCEDURE — 63600000 HC DRUGS/DETAIL CODE: Performed by: STUDENT IN AN ORGANIZED HEALTH CARE EDUCATION/TRAINING PROGRAM

## 2022-08-22 PROCEDURE — 36415 COLL VENOUS BLD VENIPUNCTURE: CPT | Performed by: SURGERY

## 2022-08-22 PROCEDURE — 21400000 HC ROOM AND CARE CCU/INTERMEDIATE

## 2022-08-22 PROCEDURE — 82330 ASSAY OF CALCIUM: CPT | Performed by: SURGERY

## 2022-08-22 PROCEDURE — 63600000 HC DRUGS/DETAIL CODE: Performed by: SURGERY

## 2022-08-22 PROCEDURE — 92526 ORAL FUNCTION THERAPY: CPT | Mod: GN

## 2022-08-22 PROCEDURE — 0JBB0ZZ EXCISION OF PERINEUM SUBCUTANEOUS TISSUE AND FASCIA, OPEN APPROACH: ICD-10-PCS | Performed by: UROLOGY

## 2022-08-22 PROCEDURE — 97110 THERAPEUTIC EXERCISES: CPT | Mod: GP,CQ

## 2022-08-22 PROCEDURE — 99024 POSTOP FOLLOW-UP VISIT: CPT | Performed by: COLON & RECTAL SURGERY

## 2022-08-22 PROCEDURE — 83735 ASSAY OF MAGNESIUM: CPT | Performed by: STUDENT IN AN ORGANIZED HEALTH CARE EDUCATION/TRAINING PROGRAM

## 2022-08-22 PROCEDURE — 63700000 HC SELF-ADMINISTRABLE DRUG: Performed by: SURGERY

## 2022-08-22 PROCEDURE — 97530 THERAPEUTIC ACTIVITIES: CPT | Mod: GO

## 2022-08-22 PROCEDURE — 63600000 HC DRUGS/DETAIL CODE

## 2022-08-22 PROCEDURE — 85610 PROTHROMBIN TIME: CPT | Performed by: STUDENT IN AN ORGANIZED HEALTH CARE EDUCATION/TRAINING PROGRAM

## 2022-08-22 PROCEDURE — 97530 THERAPEUTIC ACTIVITIES: CPT | Mod: GP,CQ

## 2022-08-22 RX ORDER — POTASSIUM CHLORIDE 14.9 MG/ML
20 INJECTION INTRAVENOUS ONCE
Status: COMPLETED | OUTPATIENT
Start: 2022-08-22 | End: 2022-08-22

## 2022-08-22 RX ADMIN — HYDROMORPHONE HYDROCHLORIDE 0.25 MG: 1 INJECTION, SOLUTION INTRAMUSCULAR; INTRAVENOUS; SUBCUTANEOUS at 18:04

## 2022-08-22 RX ADMIN — TORSEMIDE 10 MG: 20 TABLET ORAL at 09:26

## 2022-08-22 RX ADMIN — HEPARIN SODIUM 5000 UNITS: 5000 INJECTION, SOLUTION INTRAVENOUS; SUBCUTANEOUS at 14:27

## 2022-08-22 RX ADMIN — HEPARIN SODIUM 5000 UNITS: 5000 INJECTION, SOLUTION INTRAVENOUS; SUBCUTANEOUS at 06:08

## 2022-08-22 RX ADMIN — ATORVASTATIN CALCIUM 40 MG: 40 TABLET, FILM COATED ORAL at 18:45

## 2022-08-22 RX ADMIN — DAKIN'S SOLUTION 0.125% (QUARTER STRENGTH): 0.12 SOLUTION at 09:25

## 2022-08-22 RX ADMIN — INSULIN HUMAN 4 UNITS: 100 INJECTION, SOLUTION PARENTERAL at 06:15

## 2022-08-22 RX ADMIN — LANSOPRAZOLE 30 MG: KIT at 09:25

## 2022-08-22 RX ADMIN — CLOPIDOGREL BISULFATE 75 MG: 75 TABLET ORAL at 09:26

## 2022-08-22 RX ADMIN — COLLAGENASE SANTYL 1 APPLICATION.: 250 OINTMENT TOPICAL at 09:25

## 2022-08-22 RX ADMIN — ASPIRIN 81 MG: 81 TABLET, CHEWABLE ORAL at 09:25

## 2022-08-22 RX ADMIN — INSULIN HUMAN 4 UNITS: 100 INJECTION, SOLUTION PARENTERAL at 18:50

## 2022-08-22 RX ADMIN — INSULIN HUMAN 4 UNITS: 100 INJECTION, SOLUTION PARENTERAL at 13:33

## 2022-08-22 RX ADMIN — POTASSIUM CHLORIDE 20 MEQ: 14.9 INJECTION, SOLUTION INTRAVENOUS at 14:26

## 2022-08-22 RX ADMIN — MAGNESIUM SULFATE IN DEXTROSE 1 G: 10 INJECTION, SOLUTION INTRAVENOUS at 13:30

## 2022-08-22 RX ADMIN — LEVOTHYROXINE SODIUM 100 MCG: 0.1 TABLET ORAL at 06:09

## 2022-08-22 RX ADMIN — HEPARIN SODIUM 5000 UNITS: 5000 INJECTION, SOLUTION INTRAVENOUS; SUBCUTANEOUS at 22:06

## 2022-08-22 ASSESSMENT — COGNITIVE AND FUNCTIONAL STATUS - GENERAL
UNDERSTANDING 10 TO 15 MIN SPEECH: 3 - A LITTLE
TOILETING: 1 - TOTAL
CLIMB 3 TO 5 STEPS WITH RAILING: 1 - TOTAL
TAKING CARE OF COMPLICATED TASKS: 1 - UNABLE
DRESSING REGULAR LOWER BODY CLOTHING: 1 - TOTAL
EATING MEALS: 1 - TOTAL
AFFECT: WFL
REMEMBERING TO TAKE MEDICATION: 1 - UNABLE
AFFECT: WFL
REMEMBERING 5 ERRANDS WITH NO LIST: 2 - A LOT
WALKING IN HOSPITAL ROOM: 2 - A LOT
HELP NEEDED FOR PERSONAL GROOMING: 3 - A LITTLE
STANDING UP FROM CHAIR USING ARMS: 2 - A LOT
AFFECT: WFL
MOVING TO AND FROM BED TO CHAIR: 2 - A LOT
DRESSING REGULAR UPPER BODY CLOTHING: 2 - A LOT
REMEMBERING WHERE THINGS ARE: 2 - A LOT
FOLLOWS FAMILIAR CONVERSATION: 3 - A LITTLE
HELP NEEDED FOR BATHING: 2 - A LOT

## 2022-08-22 NOTE — PROGRESS NOTES
Patient:  Samy Elena  Location:  [place] [address]  MRN:  [id number]  Today's date:  8/22/2022  Speech Pathology: Therapy session    SLP Diagnosis: Per video swallow, moderate to severe oral stage and severe pharyngeal stage dysphagia, hypophonia, confusion    Recommendations:  1. Strict NPO, medication and nutrition via DHT  2. Strict aspiration precautions including frequent and stringent oral care with suction PRN, HOB > 30 degrees  3. FOR COMFORT, with RN supervision, single ice chip (1-3/hour) AFTER ORAL CARE  4. Palliative care is currently following patient, consider GOC discussion to outline GOC (feeding at known risk vs consideration for longer-term alt source for nutrition).  5.  SLP for ongoing dysphagia follow-up. Consider repeat VFSS in next 1-3 days if appropriate.       Summary/Handoff:  Daily Outcome Statement: Dysphagia bedside follow-up completed following ADL care with RN.  Patient is pleasant and cooperative.  SLP reviewed swallow exercises as written at patient's bedside.  Patient's vocal quality appears to be improving.  Reviewed swallow exercises as trained, patient continues to require cues for accurate completion.  Oral care completed with thin liquid via teaspoon trials with use of breath-hold with goal of improving sequencing for next video swallow.  Patient showed no overt signs or symptoms of aspiration with thin liquids via teaspoon with use of breath-hold.  With single cup sip with use of breath-hold patient showed coughing episode.  At this time, recommend patient remain strict n.p.o., medication and nutrition via DHT.  Suspect patient to be appropriate for repeat video swallow in next 1 to 3 days to determine if patient is appropriate for initiation of swallow.  Speech therapy for ongoing close follow-up      Session Notes:     GRBAS:   Grade 1   Roughness 1   Breathiness 1   Asthenia 1   Strain 0            Dietary Orders   (From admission, onward)     "         Start     Ordered    08/18/22 1121  Diet message  Once        Comments: Please send isosource 1.5 please and thank you    08/18/22 1121    08/18/22 1114  Tube Feed with NPO Isosource 1.5; Continuous; 50; 50; hold TF 1hr pre and 1hr post synthroid administration; Water; 40; Every 1 hour; one; RD/LDN may adjust order; AM Snack  (Tube Feed with NPO Diet Orders with insertion and maintenance panels)  Diet effective now        Comments: 1 pack prosource   Question Answer Comment   Tube Feeding Formula (LMC): Isosource 1.5    Administration Type Continuous    Tube Feeding Start rate (mL/hr): 50    Tube Feeding Goal rate (mL/hr): 50    Nursing Instruction hold TF 1hr pre and 1hr post synthroid administration    Flush type: Water    Flush amount (mL): 40    Flush frequency: Every 1 hour    Protein Supplement (packet): one    Delegation of Authority. Diet orders written by PA/CRNPs may not be adjusted by RD/LDNs. RD/LDN may adjust order    Meal period (AM Snack required for CBORD, do not remove: Not clinically relevant) AM Snack       \"And\" Linked Group Details    08/18/22 1118                Results from last 7 days   Lab Units 08/22/22  0529 08/21/22  0815 08/20/22  0754   WBC K/uL 6.83 6.77 7.43   HEMOGLOBIN g/dL 10.5* 11.4* 10.6*   HEMATOCRIT % 31.2* 33.9* 32.1*   PLATELETS K/uL 161 170 163          RN cleared SLP to assess/work with patient as no medical issues identified to preclude this therapy session. Following completion of session, pt remained in bed as they were found with call bell in reach.  Nurse was made aware of patients performance and any changes in status (if applicable).      Samy is a 89 y.o. 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    Past Medical History  Samy has a past medical history of Atrial fibrillation (CMS/HCC), Disease of thyroid gland, GI (gastrointestinal bleed), Hypertension, and Myocardial " infarction (CMS/HCC).    Per PMR: CAD status post CABG, CHF, tricuspid regurgitation and pulmonary hypertension and history of GI bleed.  Hospitalized 8-2-2022 with for near gangrene/necrotizing fasciitis status post surgical debridement and washout 8-2-2022 subsequent laparoscopic sigmoid colostomy and repeat washout 8-3-2022.  Complicated by sepsis and shock requiring pressor support with respiratory failure mechanical ventilation through 8-.  Found to be dysarthric and ataxic with delirium.  Brain MRI negative for acute event    History of Present Illness   Admit from OP MD's office 8/2 for denise's gangrene. s/p EUA wound debridement of gluteal NSTI on 8/2 and diverting loop sigmoid colostomy on 8/3  He was then admitted to the surgical ICU where he was intubated and had transient pressor requirements. ETT 8/3-8/12, extubated 8/13. Cleared for thick liquids and puree.    C/f stroke 8/17, MRI negative for acute infarct or hemorrhage  COVID (+) 8/18, transfer to surgical step-down      SLP Pain    Date/Time Pain Type Rating: Rest Rating: Activity [facility]   08/22/22 1004 Pain Assessment 0 - no pain 0 - no pain KAYLENE          Prior Living Environment    Flowsheet Row Most Recent Value   Living Environment Comment Lives alone in 2 SH with CT on first floor and bed/bath upstairs. Tub shower. Sons installed stair glide last week.        Prior Level of Function    Flowsheet Row Most Recent Value   Dominant Hand right   Eating independent   Communication difficulty speaking (not related to language barrier)  [per RN, pt's famiyl reports dysarthria PTA]   Swallowing difficulty swallowing foods   Baseline Diet/Method of Nutritional Intake no diet restrictions   Past History of Dysphagia Pt with no hx of dysphagia prior to admission   Prior Level of Function Comment Mod(i) for BADLs and mobility using RW. Has  for meals and IADLs. Rosita assist PRN.   Assistive Device Currently Used at Home caneuriel,  walker, front-wheeled           SLP Evaluation and Treatment - 08/22/22 1004        SLP Time Calculation    Start Time 1004     Stop Time 1014     Time Calculation (min) 10 min        Session Details    Document Type daily treatment/progress note     Mode of Treatment speech language pathology;individual therapy        General Information    Patient Profile Reviewed yes     General Observations of Patient Pt was received in bed after recieing ADL care from RN     Existing Precautions/Restrictions aspiration;fall;contact;droplet;NPO     Limitations/Impairments safety/cognitive;swallowing        Cognition/Psychosocial    Affect/Mental Status (Cognition) WFL     Behavioral Issues (Cognition) withdrawn     Orientation Status (Cognition) oriented x 3     Comment, Cognition Pt was received in bed, pleasant and cooperative. Pt with reduced IND recall of swallow exercises.        Vocal Quality/Secretion Management (Oral Motor)    Vocal Quality (Oral Motor) hypophonic     Comment, Vocal Quality/Secretion Management (Oral Motor) continued improvement noted        Motor Speech    Comment, Motor Speech Intervention dysarthria persists, per neuro-c/f subacute stroke?        Auditory Comprehension    Comment, Intervention (Auditory Comprehension) pt is able to follow simple commands, answer simple quesrions effectively        Verbal Expression    Comment, Intervention (Verbal Expression) Pt is pleasant, able to make simple requests. effectively, c/f ability to request higher-level (medical) wants and Needs, RN should continue to anticipate        Functional Communication Measures    FCM: Swallowing 2-->Level 2        General Swallowing Observations    Current Diet/Method of Nutritional Intake NPO;nasogastric tube (NG)     Signs/Symptoms of Aspiration (Current Diet) concerns for silent aspiration     Respiratory Support (General Swallowing Observations) none     Comment, Secretions/Suctioning oral care completed     Comment,  General Swallowing Observations Pt assessed after oral care. Re-education on swallow exercises (written at pt's bedside). Discussed goal of repeat VFSS in next 1-3 days to determine if pt is appropriate for initiation of PO diet.        Food and Liquid Trials (NIS)    Patient Positioning HOB elevated (specify degrees)     Oral Intake/Feeding Performance oral trials administered by therapist     Liquid Consistencies Evaluated thin liquids     Thin Liquids use of teaspoon;single cup sips     Comment, Thin Liquids tsp thins-with training in use of breath hold -no overt s/sx of aspiration cannot rule-out silent aspiration a tthe bedside, single cup sip trial-coughing episode     Oral Preparatory Phase of Swallow moderate impairment;severe impairment     Oral Phase of Swallow moderate impairment;severe impairment     Comment, Oral Phase ongoing training in swallow exercises, training in breath hold (for trialing at next study)     Pharyngeal Phase of Swallow delayed swallow reflex initiation;decreased laryngeal elevation;uncoordinated swallow;coughing after swallow     Esophageal Phase of Swallow no clinical symptoms     Comment Plan for repeat VFSS in next 1-3 days        Swallowing Recommendations    Diet Consistency Recommendations NPO     Medication Administration non-oral route     Recommend VFSS (Comment) Goal of repeat VFSS in next 1-3 days     Comment, Swallowing Recommendations Continue with strict aspiration precautions including frequent and stringent oral care with suction PRN,HOB > 30 degrees        Swallowing Intervention    Dysphagia/Swallowing Interventions monitor tolerance of;advanced diet/liquid texture trials;oral sensory stimulation program;pharyngeal therapeutic exercise program        AM-PAC (TM) - Cognition (Current Function)    Following/understanding a 10-15 minute speech or presentation? 3 - A little     Understanding familiar people during ordinary conversations? 3 - A little     Remembering  to take medications at the appropriate time? 1 - Unable     Remembering where things were placed or put away? 2 - A lot     Remembering a list of 3 or 4 errands without writing it down? 2 - A lot     Taking care of complicated tasks? 1 - Unable     AM-PAC (TM) Cognition Score 12        Assessment/Plan (SLP)    Daily Outcome Statement Dysphagia bedside follow-up completed following ADL care with RN.  Patient is pleasant and cooperative.  SLP reviewed swallow exercises as written at patient's bedside.  Patient's vocal quality appears to be improving.  Reviewed swallow exercises as trained, patient continues to require cues for accurate completion.  Oral care completed with thin liquid via teaspoon trials with use of breath-hold with goal of improving sequencing for next video swallow.  Patient showed no overt signs or symptoms of aspiration with thin liquids via teaspoon with use of breath-hold.  With single cup sip with use of breath-hold patient showed coughing episode.  At this time, recommend patient remain strict n.p.o., medication and nutrition via DHT.  Suspect patient to be appropriate for repeat video swallow in next 1 to 3 days to determine if patient is appropriate for initiation of swallow.  Speech therapy for ongoing close follow-up     Rehab Potential fair, will monitor progress closely     Therapy Frequency 5 times/wk     Planned Therapy Interventions dysphagia therapy               SLP Assessment/Plan    Flowsheet Row Most Recent Value   SLP Diagnosis Per video swallow, moderate to severe oral stage and severe pharyngeal stage dysphagia, hypophonia, confusion at 08/22/2022 1004   Patient/Family Therapy Goal Statement To drink water at 08/22/2022 1004                    SLP Goals    Flowsheet Row Most Recent Value   Oral Nutrition/Hydration Goal 1    Activity effective/safe/independent, oral nutrition/hydration, use of swallowing techniques, management of texture/viscosity at 08/13/2022 1026   Time  Frame long-term goal (LTG), by discharge at 08/13/2022 1026   Strategies/Barriers ETT x9 days, high oxygen demands at 08/13/2022 1026   Progress/Outcome goal ongoing at 08/22/2022 1004

## 2022-08-22 NOTE — PROCEDURES
I personally examined the patient and there are no changes to the H & P     Indications for procedure: 89-year-old male initially admitted 8/2/2022 with a necrotizing soft tissue infection of the perineum and buttocks.  He underwent exam under anesthesia and incision and debridement in the operating room 8/2/2022 followed by repeat debridement and diverting colostomy 8/3/2022 by colorectal surgery.  Postoperatively, he was aggressively resuscitated and subsequently developed severe penile edema which caused blistering and eventual necrosis of his preputial skin.  This morning, we were called to evaluate the patient's foreskin at the bedside.  Ultimately, given his COVID-positive status, we opted to proceed with bedside debridement of his foreskin.  The risks, benefits and complications regarding bedside penile debridement were explained to the patient in great detail and he is agreed to proceed.    Procedure: The patient was identified by name and MRN.  His bed was laid flat and chucks were placed underneath the patient scrotum.  A procedural timeout was performed with nursing in the room and all parties were in agreement.  Using pickups and scissors, all necrotic appearing tissue along the foreskin was carefully debrided until healthy, bleeding tissue was encountered.  Hemostasis was obtained via use of hand-held Bovie electrocautery and suture ligature.  The penis was carefully wrapped with Xeroform dressing, 4 x 4's and Kerlix.  The total area of necrotic tissue was much more extensive than initially anticipated.  Therefore, the decision was made to take the patient to the operating room tomorrow for penile debridement and circumcision.    [facility]  Jorge Fry DO PGY-4  Josue Perez Pager #[phone]  [facility] Region[facility] Pager #[phone]

## 2022-08-22 NOTE — PLAN OF CARE
Problem: Adult Inpatient Plan of Care  Goal: Plan of Care Review  Outcome: Progressing  Flowsheets (Taken 8/21/2022 2213)  Progress: improving  Plan of Care Reviewed With: patient  Outcome Summary: VSS on rm air.  AAO x4. Bed bound.  pt denies any pain.  DHT in place.  Tube feed running. Call bell within reach.  Will continue to follow.     Problem: Adult Inpatient Plan of Care  Goal: Patient-Specific Goal (Individualized)  Outcome: Progressing     Problem: Adult Inpatient Plan of Care  Goal: Absence of Hospital-Acquired Illness or Injury  Outcome: Progressing     Problem: Adult Inpatient Plan of Care  Goal: Optimal Comfort and Wellbeing  Outcome: Progressing     Problem: Adult Inpatient Plan of Care  Goal: Readiness for Transition of Care  Outcome: Progressing     Problem: Skin Injury Risk Increased  Goal: Skin Health and Integrity  Outcome: Progressing     Problem: Fall Injury Risk  Goal: Absence of Fall and Fall-Related Injury  Outcome: Progressing     Problem: Restraint, Nonbehavioral (Nonviolent)  Goal: Discontinuation Criteria Achieved  Outcome: Progressing

## 2022-08-22 NOTE — PLAN OF CARE
Continue current TF order.   Problem: Adult Inpatient Plan of Care  Goal: Plan of Care Review  Outcome: Progressing     Problem: Skin Injury Risk Increased  Goal: Skin Health and Integrity  Outcome: Progressing     Problem: Feeding Intolerance (Enteral Nutrition)  Goal: Feeding Tolerance  Outcome: Progressing

## 2022-08-22 NOTE — PROGRESS NOTES
Patient: Samy Elena  Location:  [place] [address]  MRN:  [id number]  Today's date:  8/22/2022     RN concurs with PTA assessment that patient is medically stable and appropriate for therapy.    Bed placed in chair position.  Patient left with call bell within reach, all stated needs met.  RN aware    Samy is a 89 y.o. 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    Past Medical History  Samy has a past medical history of Atrial fibrillation (CMS/HCC), Disease of thyroid gland, GI (gastrointestinal bleed), Hypertension, and Myocardial infarction (CMS/HCC).    Per PMR: CAD status post CABG, CHF, tricuspid regurgitation and pulmonary hypertension and history of GI bleed.  Hospitalized 8-2-2022 with for near gangrene/necrotizing fasciitis status post surgical debridement and washout 8-2-2022 subsequent laparoscopic sigmoid colostomy and repeat washout 8-3-2022.  Complicated by sepsis and shock requiring pressor support with respiratory failure mechanical ventilation through 8-.  Found to be dysarthric and ataxic with delirium.  Brain MRI negative for acute event    History of Present Illness   Admit from OP MD's office 8/2 for denise's gangrene. s/p EUA wound debridement of gluteal NSTI on 8/2 and diverting loop sigmoid colostomy on 8/3  He was then admitted to the surgical ICU where he was intubated and had transient pressor requirements. ETT 8/3-8/12, extubated 8/13. Cleared for thick liquids and puree.    C/f stroke 8/17, MRI negative for acute infarct or hemorrhage  COVID (+) 8/18, transfer to surgical step-down      PT Vitals    Date/Time Pulse HR Source SpO2 Pt Activity O2 Therapy BP MAP BP Location BP Method Pt Position Who   08/22/22 1112 80 PVC x 12/min Monitor 94 % At rest None (Room air) 119/79 93 mmHg Left upper arm Automatic Lying DS   08/22/22 1150 91 PVC x 24/min Monitor 92 % Other  (Comment) after standing trials None (Room air) 118/65 87 mmHg Left upper arm Automatic Lying DS      PT Pain    Date/Time Pain Type Rating: Rest Rating: Activity [facility]   08/22/22 1112 Pain Reassessment 0 0 DS          Prior Living Environment    Flowsheet Row Most Recent Value   Living Environment Comment Lives alone in 2  with DC on first floor and bed/bath upstairs. Tub shower. Sons installed stair glide last week.        Prior Level of Function    Flowsheet Row Most Recent Value   Dominant Hand right   Eating independent   Communication difficulty speaking (not related to language barrier)  [per RN, pt's emilyyl reports dysarthria PTA]   Swallowing difficulty swallowing foods   Baseline Diet/Method of Nutritional Intake no diet restrictions   Past History of Dysphagia Pt with no hx of dysphagia prior to admission   Prior Level of Function Comment Mod(i) for BADLs and mobility using RW. Has  for meals and IADLs. Sons assist PRN.   Assistive Device Currently Used at Home cane, straight, walker, front-wheeled           PT Evaluation and Treatment - 08/22/22 1112        PT Time Calculation    Start Time 1112     Stop Time 1151     Time Calculation (min) 39 min        Session Details    Document Type daily treatment/progress note     Mode of Treatment physical therapy;co-treatment        General Information    Existing Precautions/Restrictions aspiration;fall;contact;droplet;NPO;other (see comments)   (+) COVID       Cognition/Psychosocial    Affect/Mental Status (Cognition) WFL     Orientation Status (Cognition) oriented x 4        Bed Mobility    [place], Roll Left 1 person assist;maximum assist (25-49% patient effort)     [place], Roll Right 1 person assist;maximum assist (25-49% patient effort)     [place], Supine to Sit 1 person assist;maximum assist (25-49% patient effort)     [place], Sit to Supine 2 person assist;maximum assist (25-49% patient effort)     Comment (Bed Mobility)  cues for sequencing and hand placement        Sit to Stand Transfer    [place], Sit to Stand Transfer 2 person assist;maximum assist (25-49% patient effort)     Verbal Cues preparatory posture;hand placement;maintaining center of gravity over base of support;technique;safety     Comment standing trials x 3        Stand to Sit Transfer    [place], Stand to Sit Transfer 2 person assist;maximum assist (25-49% patient effort)     Comment cued for increased eccentric control and hand placement        Gait Training    [place], Gait not tested     Comment (Gait/Stairs) unable to accomplish side-stepping to HOB        Stairs Training    [place], Stairs unable to assess        Balance    Static Sitting Balance mild impairment;severe impairment;other (see comments)   waxing/waning    Dynamic Sitting Balance moderate impairment     Sit to Stand Dynamic Balance severe impairment     Static Standing Balance severe impairment     Dynamic Standing Balance severe impairment        Therapeutic Exercise    Therapeutic Exercise lower extremity        Lower Extremity (Therapeutic Exercise)    Exercise Position/Type supine;AROM (active range of motion);AAROM (active assistive range of motion)     General Exercise bilateral;ankle pumps;SAQ (short arc quad);heel slides;hip aBduction;hip aDduction;other (see comments)   SLR       AM-PAC (TM) - Mobility (Current Function)    Turning from your back to your side while in a flat bed without using bedrails? 2 - A Lot     Moving from lying on your back to sitting on the side of a flat bed without using bedrails? 2 - A Lot     Moving to and from a bed to a chair? 2 - A Lot     Standing up from a chair using your arms? 2 - A Lot     To walk in a hospital room? 2 - A Lot     Climbing 3-5 steps with a railing? 1 - Total     AM-PAC (TM) Mobility Score 11        Assessment/Plan (PT)    Daily Outcome Statement Pt requires maximum assist of one for bed mobility and maximum of two for  transfers.  Pt unable to perform pre-gait activity or side-step to head of bed.  Significant mobility deficits, as a result of complex post-op course, can be addressed by continued PT in skilled nursing facilty.     Rehab Potential good, to achieve stated therapy goals     Therapy Frequency 5 times/wk     Planned Therapy Interventions balance training;bed mobility training;gait training;transfer training;strengthening;stair training;ROM (range of motion)               PT Assessment/Plan    Flowsheet Row Most Recent Value   PT Recommended Discharge Disposition skilled nursing facility at 08/22/2022 1112   Anticipated Equipment Needs at Discharge (PT) other (see comments)  [DME needs TBD at d/c to home] at 08/22/2022 1112   Patient/Family Therapy Goals Statement get stronger at 08/19/2022 1132                    Education Documentation  Treatment Plan, taught by Arcenio Smyth PTA at 8/22/2022 12:15 PM.  Learner: Patient  Readiness: Acceptance  Method: Explanation  Response: Verbalizes Understanding  Comment: Pt instructed in safe transfer technique.          PT Goals    Flowsheet Row Most Recent Value   Bed Mobility Goal 1    Activity/Assistive Device rolling to left, rolling to right, sit to supine, supine to sit at 08/16/2022 0948   [place] minimum assist (75% or more patient effort) at 08/16/2022 0948   Time Frame by discharge at 08/16/2022 0948   Progress/Outcome goal ongoing at 08/19/2022 1132   Transfer Goal 1    Activity/Assistive Device bed-to-chair/chair-to-bed, sit-to-stand/stand-to-sit at 08/16/2022 0948   [place] moderate assist (50-74% patient effort) at 08/16/2022 0948   Time Frame by discharge at 08/16/2022 0948   Progress/Outcome goal ongoing at 08/19/2022 1132   Gait Training Goal 1    Activity/Assistive Device gait (walking locomotion), assistive device use, decrease asymmetrical patterns, decrease fall risk, diminish gait deviation, improve balance and speed, increase endurance/gait  distance, increase energy conservation at 08/19/2022 1132   [place] maximum assist (25-49% patient effort) at 08/19/2022 1132   Distance 5 at 08/19/2022 1132   Time Frame by discharge at 08/19/2022 1132   Progress/Outcome goal revised this date, goal ongoing at 08/19/2022 1132

## 2022-08-22 NOTE — PLAN OF CARE
Problem: Adult Inpatient Plan of Care  Goal: Plan of Care Review  Flowsheets (Taken 8/22/2022 1194)  Progress: improving  Outcome Summary: plan LTACH once bed available       Pt discussed with [facility] Team during CPR this day. Given indeterminate need for DHT as well as need for aggressive ST, LTACH is optimal transition of care plan. [facility] Liaison aware of plan - no bed available today. Liaison to notify SW of anticipated bed availability as soon as this is known. SW will continue to follow for same.

## 2022-08-22 NOTE — PROGRESS NOTES
"Nutrition Assessment    Recommendations  1. NPO/ TF- change to Isosource 1.5 at 50 ml/hr x22 hr, 1 pack prosource. Maintenance water flush 40 ml/hr  ++hold TF one hour pre and post synthroid admin     2. BG goal 140 - 180 mg/dL  - Regular insulin SSI Q6H with TF    Clinical Course: Patient is a 89 y.o. 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     Past Medical History:   Diagnosis Date   • Atrial fibrillation (CMS/HCC)    • Disease of thyroid gland    • GI (gastrointestinal bleed)    • Hypertension    • Myocardial infarction (CMS/HCC)      Past Surgical History:   Procedure Laterality Date   • BYPASS GRAFT         Reason for Assessment  Reason For Assessment: identified at risk by screening criteria ([facility])  Patient Already Seen On: 08/15/22     [facility] Nutrition Screen Tool  Has patient lost weight without trying?: 2-->Unsure  If yes,how much weight has been lost?: 2-->Unsure  Has patient been eating poorly due to decreased appetite?: 0-->No  [facility] Nutrition Screen Score: 4     Nutrition/Diet History  Intake (%): 25%     Physical Findings  Overall Physical Appearance: on oxygen therapy (off vent)  Gastrointestinal: colostomy (200ml)  Last Bowel Movement: 08/17/22  Tubes: [place]  Skin: surgical incision, edema (+3 LE edema)     [id number] Physical Appearance  Overall Physical Appearance: on oxygen therapy (off vent)  Gastrointestinal: colostomy (200ml)  Last Bowel Movement: 08/17/22  Tubes: [place]  Skin: surgical incision, edema (+3 LE edema)     Nutrition Order  Nutrition Order: meets nutritional requirements  Nutrition Order Comments: Isosource 1.5 @ 50ml/hr x22 hrs, 1 pack prosource, 40ml H2O flush q1 hr.  Current TF/TPN Regimen: yes     Anthropometrics  Height: 185.4 cm (6' 1\")           Current Weight  Weight Method: Bed scale  Weight: 94.1 kg (207 lb 7.3 oz)     Ideal Body Weight (IBW)  Ideal Body Weight (IBW) (kg): 84.86  % Ideal Body Weight: 94.61       Body Mass " Index (BMI)  BMI (Calculated): 27.4         Labs/Procedures/Meds  Lab Results Reviewed: reviewed, pertinent   BMP Results       22     0529 0815 0754     136 137    K 3.9 3.9 3.4    Cl 104 104 107    CO2 26 24 25    Glucose 133 157 129    BUN 27 28 25    Creatinine 0.8 0.8 0.8    Calcium 7.6 7.7 7.6    Anion Gap 7 8 5    EGFR >60.0 >60.0 >60.0      M.9, Phos: 3.1, POC: 139, 114      Medications  Pertinent Medications Reviewed: reviewed, pertinent   Scheduled Meds:  • aspirin  81 mg feeding tube Daily   • atorvastatin  40 mg feeding tube Daily (6p)   • clopidogreL  75 mg feeding tube Daily   • collagenase  1 application Topical Daily   • heparin (porcine)  5,000 Units subcutaneous q8h IVÁN   • insulin regular  3-5 Units subcutaneous q6h IVÁN   • insulin regular  4 Units subcutaneous q6h IVÁN   • lansoprazole  30 mg feeding tube Daily   • levothyroxine  100 mcg feeding tube Daily (6:30a)   • magnesium sulfate  1 g intravenous Once   • metoprolol tartrate  25 mg feeding tube BID   • potassium chloride  20 mEq intravenous Once   • sodium hypochlorite   Topical Daily   • torsemide  10 mg feeding tube Daily         Calorie Requirements  Estimated kCal Needs: Actual Body Weight  Estimated Calorie Need Method: kcal/kg  Calorie/kg Recommended: 22-25  Calorie Recommendations: 8099-4757           Protein Requirements  Recommended Dosing Weight (Estimated Protein Needs): Actual Body Weight  Est Protein Requirement Amount (gms/kg):  (1.2-1.5)  Protein Recommendations:          Fluid requirements 2000 ml (25 ml/kg)    Dosing weight: 80kg admit wt    Tube Feeding Assessment  Active Tube Feed Order: Yes  Delivery Method: Continuous per pump  TF Protein Grams: 97 grams  TF Total kCals: 1875 kcals  Projected Tube Feed Volume: 1200  TF Free Water: 924  Tolerance: tolerating        Clinical comments: Pt seen for follow up. Per [facility] COVID visitation restrictions, RD not able to enter the room. Pt  tested positive for COVID 8/18. Pt remains NPO with TF via DHT. Pt tolerating goal rate of 50ml/hr. Plans for transfer to LTAC with possible outpatient PEG placement. Surgery Resident notes they suspect swallowing function will return 2/2 aggressive SLP therapy. Recs for repeat VFSS in 1-3 days per SLP note.     Goal TF: Isosource 1.5 at 50 ml/hr x22 hr, 1 pack prosource = 1875 kcal (23 kcal/kg ABW), 97 g protein (1.2 g/kg ABW), 924 ml water in TF. Maintenance water flush 40  ml/hr =  total 1884  ml (24 ml/kg ABW)     Goals: TF to meet 100% of needs  Monitor:  TF, labs, plan of care, GI function  Recommendations: See above     PES  Statement: PES Statement  Nutrition Diagnosis: Inadequate Energy Intake  Related To:: Decreased ability to consume sufficient energy  As Evidenced By:: intubated/ NPO  Nutritional Needs Met?: Yes                                   Date: 08/22/22  Signature: BRANDY Padilla

## 2022-08-22 NOTE — PROGRESS NOTES
Patient:  Samy Elena  Location:  [place] [address]  MRN:  [id number]  Today's date:  8/22/2022  Patient received:  supine  RN aware of session.   End of session: Pt modified chair position in bed. Bed alarm on, call bell and personal items within reach, all needs met. Nursing staff aware of patient status.     Samy is a 89 y.o. 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    Past Medical History  Samy has a past medical history of Atrial fibrillation (CMS/HCC), Disease of thyroid gland, GI (gastrointestinal bleed), Hypertension, and Myocardial infarction (CMS/HCC).    Per PMR: CAD status post CABG, CHF, tricuspid regurgitation and pulmonary hypertension and history of GI bleed.  Hospitalized 8-2-2022 with for near gangrene/necrotizing fasciitis status post surgical debridement and washout 8-2-2022 subsequent laparoscopic sigmoid colostomy and repeat washout 8-3-2022.  Complicated by sepsis and shock requiring pressor support with respiratory failure mechanical ventilation through 8-.  Found to be dysarthric and ataxic with delirium.  Brain MRI negative for acute event    History of Present Illness   Admit from OP MD's office 8/2 for denise's gangrene. s/p EUA wound debridement of gluteal NSTI on 8/2 and diverting loop sigmoid colostomy on 8/3  He was then admitted to the surgical ICU where he was intubated and had transient pressor requirements. ETT 8/3-8/12, extubated 8/13. Cleared for thick liquids and puree.    C/f stroke 8/17, MRI negative for acute infarct or hemorrhage  COVID (+) 8/18, transfer to surgical step-down      OT Vitals    Date/Time Pulse HR Source SpO2 Pt Activity O2 Therapy BP MAP BP Location BP Method Pt Position Who   08/22/22 1150 91 PVC x 24/min Monitor 92 % Other (Comment) after standing trials None (Room air) 118/65 87 mmHg Left upper arm Automatic Lying DS      OT Pain     Date/Time Pain Type Rating: Rest Rating: Activity [facility]   08/22/22 1128 Pain Assessment 0 0 OK          Prior Living Environment    Flowsheet Row Most Recent Value   Living Environment Comment Lives alone in 2 SH with WA on first floor and bed/bath upstairs. Tub shower. Sons installed stair glide last week.        Prior Level of Function    Flowsheet Row Most Recent Value   Dominant Hand right   Eating independent   Communication difficulty speaking (not related to language barrier)  [per RN, pt's kaideniyl reports dysarthria PTA]   Swallowing difficulty swallowing foods   Baseline Diet/Method of Nutritional Intake no diet restrictions   Past History of Dysphagia Pt with no hx of dysphagia prior to admission   Prior Level of Function Comment Mod(i) for BADLs and mobility using RW. Has  for meals and IADLs. Sons assist PRN.   Assistive Device Currently Used at Home cane, straight, walker, front-wheeled        Occupational Profile    Flowsheet Row Most Recent Value   Reason for Services/Referral new onset of L-sided weakness,  MRI (-)   Patient Goals to go home           OT Evaluation and Treatment - 08/22/22 1128        OT Time Calculation    Start Time 1128     Stop Time 1151     Time Calculation (min) 23 min        Session Details    Document Type daily treatment/progress note     Mode of Treatment occupational therapy;co-treatment        General Information    Patient/Family/Caregiver Comments/Observations RN aware of session     General Observations of Patient recd seated EOB, agreeable. PT at bedside     Existing Precautions/Restrictions aspiration;contact;droplet;fall   COVID+    Limitations/Impairments safety/cognitive        Cognition/Psychosocial    Affect/Mental Status (Cognition) WFL     Orientation Status (Cognition) oriented x 3     Follows Commands (Cognition) follows two-step commands;over 90% accuracy;delayed response/completion;increased processing time needed;verbal cues/prompting required      Cognitive Function executive function deficit;safety deficit     Executive Function Deficit (Cognition) minimal deficit;insight/awareness of deficits;judgment;problem-solving/reasoning;organization/sequencing     Safety Deficit (Cognition) minimal deficit;awareness of need for assistance;insight into deficits/self-awareness;problem-solving     Comment, Cognition awake and able to make needs known. benefits from increased processing time for 1-2 step command following. communicates enthusiastically with family on ipad conference        Bed Mobility    [place], Sit to Supine maximum assist (25-49% patient effort);2 person assist     Assistive Device draw sheet;head of bed elevated     Comment (Bed Mobility) recd seated EOB, req MaxA for trunk and MaxA for BLE management from sit>supine, fatigued after multi stand trials        Sit to Stand Transfer    [place], Sit to Stand Transfer maximum assist (25-49% patient effort);2 person assist     Verbal Cues safety;technique     Assistive Device other (see comments)     Comment HHAx2 from elevated EOB x3 trials with 2nd attempt achieve full upright stance , unable to advance for steps to chair safely        Stand to Sit Transfer    [place], Stand to Sit Transfer maximum assist (25-49% patient effort);2 person assist     Verbal Cues safety     Assistive Device other (see comments)     Comment HHAx2 to EOB        Balance    Static Sitting Balance moderate impairment     Dynamic Sitting Balance moderate impairment     Sit to Stand Dynamic Balance severe impairment     Static Standing Balance severe impairment     Dynamic Standing Balance unable to perform activity     Balance Interventions sitting     Comment, Balance seated EOB with grossly [place] with L lateral lean, MAxAx2 in stance        Motor Skills    Functional Endurance improved from reported prior sessions seated EOB for >10 min and sit<>St trials x3        Upper Body Dressing    Tasks antonio/hermilo      [place] maximum assist (25-49% patient effort)     Position edge of bed sitting        Lower Body Dressing    Tasks socks     [place] dependent (less than 25% patient effort)     Position edge of bed sitting        Toileting    [place] dependent (less than 25% patient effort)     Comment catheter        BADL Safety/Performance    Impairments, BADL Safety/Performance balance;endurance/activity tolerance;strength;trunk/postural control        AM-PAC (TM) - ADL (Current Function)    Putting on and taking off regular lower body clothing? 1 - Total     Bathing? 2 - A Lot     Toileting? 1 - Total     Putting on/taking off regular upper body clothing? 2 - A Lot     How much help for taking care of personal grooming? 3 - A Little     Eating meals? 1 - Total   NPO    AM-PAC (TM) ADL Score 10        Assessment/Plan (OT)    Daily Outcome Statement Pt seen for OT session, recd EOB, req MaxAx2 with 3 trials for sit<>Stand from EOB. Pt req grossly [place] for balance at EOB and L lateral lean. rec increased A for ADL, will benefit from OT t/o admission. Rec SNF     Rehab Potential fair, will monitor progress closely     Therapy Frequency 3-5 times/wk     Planned Therapy Interventions activity tolerance training;adaptive equipment training;cognitive/visual perception retraining;BADL retraining;functional balance retraining;IADL retraining;occupation/activity based interventions;patient/caregiver education/training;ROM/therapeutic exercise;strengthening exercise;transfer/mobility retraining               OT Assessment/Plan    Flowsheet Row Most Recent Value   OT Recommended Discharge Disposition skilled nursing facility at 08/22/2022 1128   Anticipated Equipment Needs At Discharge (OT) --  [TBD at next level of care] at 08/19/2022 1133   Patient/Family Therapy Goal Statement to get stronger at 08/16/2022 0949                         OT Goals    Flowsheet Row Most Recent Value   Bed Mobility Goal 1    Activity/Assistive  Device bed mobility activities, all at 08/16/2022 0949   [place] minimum assist (75% or more patient effort) at 08/16/2022 0949   Time Frame by discharge at 08/16/2022 0949   Progress/Outcome goal ongoing at 08/22/2022 1128   Transfer Goal 1    Activity/Assistive Device all transfers at 08/16/2022 0949   [place] minimum assist (75% or more patient effort) at 08/16/2022 0949   Time Frame by discharge at 08/16/2022 0949   Progress/Outcome goal ongoing at 08/22/2022 1128   Dressing Goal 1    Activity/Adaptive Equipment dressing skills, all at 08/16/2022 0949   [place] minimum assist (75% or more patient effort) at 08/16/2022 0949   Time Frame by discharge at 08/16/2022 0949   Strategies/Barriers using AD PRN at 08/16/2022 0949   Progress/Outcome goal ongoing at 08/22/2022 1128   Toileting Goal 1    Activity/Assistive Device toileting skills, all at 08/16/2022 0949   [place] minimum assist (75% or more patient effort) at 08/16/2022 0949   Time Frame by discharge at 08/16/2022 0949   Strategies/Barriers via commode at 08/16/2022 0949   Progress/Outcome goal ongoing at 08/22/2022 1128   Grooming Goal 1    Activity/Assistive Device grooming skills, all at 08/19/2022 1133   [place] supervision required at 08/19/2022 1133   Time Frame by discharge at 08/19/2022 1133   Progress/Outcome goal ongoing at 08/22/2022 1128

## 2022-08-22 NOTE — PROGRESS NOTES
General Surgery Daily Progress Note    Subjective   NAEO. AAOx4. Was seen resting comfortably in bed. Endorses pain is well controlled. Semi-solid brown stool seen in ostomy bag. DHT in place. Denies chest pain, fever, chills, nausea, vomiting.    Objective     Vital signs in last 24 hours:  Temp:  [36.1 °C (97 °F)-36.9 °C (98.5 °F)] 36.1 °C (97 °F)  Heart Rate:  [79-86] 86  Resp:  [18] 18  BP: (109-136)/(59-72) 123/72      Intake/Output Summary (Last 24 hours) at 8/22/2022 0916  Last data filed at 8/22/2022 0300  Gross per 24 hour   Intake 2330 ml   Output 1915 ml   Net 415 ml     Physical Exam    General appearance: AA&O x4, comfortable, in good spirits and cheerful  ENT: DHT secured at nares  Neck: no JVD  Lungs: Normal respiratory effort, 95% on room air. No cough appreciated  Heart:  regular rate  Abdomen: stoma pink and protruding, productive for gas and semi-solid brown stool. Abdomen soft, nondistended, non-tender and not tympanitic.   Skin: dark, possible necrotic, skin changes on penis   Neurologic: Grossly normal    VTE Assessment: I have reassessed and the patient's VTE risk and treatment plan is appropriate.    Labs:  BMP Results       08/22/22 08/21/22 08/20/22     0529 0815 0754     136 137    K 3.9 3.9 3.4    Cl 104 104 107    CO2 26 24 25    Glucose 133 157 129    BUN 27 28 25    Creatinine 0.8 0.8 0.8    Calcium 7.6 7.7 7.6    Anion Gap 7 8 5    EGFR >60.0 >60.0 >60.0         CBC Results       08/22/22 08/21/22 08/20/22     0529 0815 0754    WBC 6.83 6.77 7.43    RBC 3.04 3.27 3.13    HGB 10.5 11.4 10.6    HCT 31.2 33.9 32.1    .6 103.7 102.6    MCH 34.5 34.9 33.9    MCHC 33.7 33.6 33.0     170 163          Imaging  No new imaging in the last 24 hrs       Assessment/Plan   89 y/o male with PMH of biventricular heart failure, HTN, Afib, CAD who is now s/p EUA wound debridement of gluteal NSTI on 8/2 and diverting loop sigmoid colostomy on 8/3.    Extubated on 8/13  Now COVID  positive as of 8/18, asymptomatic    Plan  - multi-modal pain control  - COVID-19 positive  - continue tube feeds at goal via DHT  - d/c picc   - f/u morning labs, replete electrolytes as needed  - consider out-patient PEG once patient has recovered from current illness / operations if unable to return to normal po intake despite aggressive SLP therapy  - f/u endo  - f/u urology to evaluate groin skin changes   - f/u wound photo; continue BID packing changes to wound with santyl and dakins per nursing.   - plan for d/c to [facility]     - appreciate all consultant recs.      Emilie Alvarez, DO  General Surgery Resident  Please page Wilmer [phone] with any questions or concerns.

## 2022-08-22 NOTE — PROGRESS NOTES
Urology Daily Progress Note    Subjective     Interval History: Since last seen by urology, he has been extubated and transferred out of the ICU. His WBC has normalized and he has been afebrile. He has also been incidentally found to be COVID+ on 8/18.    Urology was re-engaged due to concern for penile lesions. He denies any pain, either abdominal, perineal, or penile. He is resting comfortably without complaint.       Objective     Vital signs in last 24 hours:  Temp:  [36.1 °C (97 °F)-36.9 °C (98.5 °F)] 36.1 °C (97 °F)  Heart Rate:  [79-86] 84  Resp:  [18] 18  BP: (109-136)/(59-72) 117/70      Intake/Output Summary (Last 24 hours) at 8/22/2022 1048  Last data filed at 8/22/2022 0300  Gross per 24 hour   Intake 2330 ml   Output 1865 ml   Net 465 ml     Intake/Output this shift:  No intake/output data recorded.    Labs  Results from last 7 days   Lab Units 08/22/22  0529 08/21/22  0815 08/20/22  0754   WBC K/uL 6.83 6.77 7.43   HEMOGLOBIN g/dL 10.5* 11.4* 10.6*   HEMATOCRIT % 31.2* 33.9* 32.1*   PLATELETS K/uL 161 170 163     Results from last 7 days   Lab Units 08/22/22  0529 08/21/22  0815 08/20/22  0754   SODIUM mEQ/L 137 136 137   POTASSIUM mEQ/L 3.9 3.9 3.4*   CHLORIDE mEQ/L 104 104 107   CO2 mEQ/L 26 24 25   BUN mg/dL 27* 28* 25*   CREATININE mg/dL 0.8 0.8 0.8   GLUCOSE mg/dL 133* 157* 129*   CALCIUM mg/dL 7.6* 7.7* 7.6*     UA Results       08/08/22     0042    Color Yellow    Clarity Clear    Glucose >=1000    Bilirubin Negative    Ketones Negative    Sp Grav 1.025    Blood Negative    Ph 6.5    Protein +1    Urobilinogen 0.2    Nitrite Negative    Leuk Est Negative    WBC 0 TO 3    RBC 0 TO 4    Bacteria Rare         Comment for Blood at 0042 on 08/08/22: The sensitivity of the occult blood test is equivalent to approximately 4 intact RBC/HPF.    Comment for Leuk Est at 0042 on 08/08/22: Results can be falsely negative due to high specific gravity, some antibiotics, glucose >3 g/dl, or WBC other than  neutrophils.        Microbiology Results     Procedure Component Value Units Date/Time    SARS-CoV-2 (COVID-19), PCR Nasopharynx [[id number]]  (Abnormal) Collected: 08/18/22 1156    Specimen: Nasopharyngeal Swab from Nasopharynx Updated: 08/18/22 1355    Narrative:      The following orders were created for panel order SARS-CoV-2 (COVID-19), PCR Nasopharynx.  Procedure                               Abnormality         Status                     ---------                               -----------         ------                     SARS-CoV-2 (COVID-19), P...[[id number]]  Abnormal            Final result                 Please view results for these tests on the individual orders.    SARS-CoV-2 (COVID-19), PCR Nasopharynx [[id number]]  (Abnormal) Collected: 08/18/22 1156    Specimen: Nasopharyngeal Swab from Nasopharynx Updated: 08/18/22 1355     SARS-CoV-2 (COVID-19) Positive    Narrative:      Testing performed using real-time PCR for detection of COVID-19. EUA approved validation studies performed on site.     Sputum culture / smear Expectorated Sputum [[id number]]  (Abnormal) Collected: 08/08/22 0311    Specimen: Expectorated Sputum Updated: 08/10/22 0705    Narrative:      The following orders were created for panel order Sputum culture / smear Expectorated Sputum.  Procedure                               Abnormality         Status                     ---------                               -----------         ------                     Sputum Gram Stain (Lab O...[[id number]]                      Final result               Sputum Culture (Lab Only...[[id number]]  Abnormal            Final result                 Please view results for these tests on the individual orders.    Sputum Gram Stain (Lab Only) Expectorated Sputum [[id number]] Collected: 08/08/22 0311    Specimen: Expectorated Sputum Updated: 08/08/22 1038     Gram Stain Result 2+ WBC      No Epithelial Cells Seen      2+ Yeast    Sputum Culture (Lab Only)  Expectorated Sputum [[id number]]  (Abnormal) Collected: 08/08/22 0311    Specimen: Expectorated Sputum Updated: 08/10/22 0705     Culture **Positive Culture**      4+ Yeast, No Further Identification      No Normal Jennifer    Blood Culture Blood, Venous [[id number]]  (Normal) Collected: 08/08/22 0235    Specimen: Blood, Venous Updated: 08/13/22 0301     Culture No growth at 120 hours    Blood Culture Blood, Venous [[id number]]  (Normal) Collected: 08/08/22 0235    Specimen: Blood, Venous Updated: 08/13/22 0301     Culture No growth at 120 hours    MRSA Screen, Nares Only Nose [[id number]]  (Normal) Collected: 08/02/22 2248    Specimen: Nasal Swab from Nose Updated: 08/03/22 0030     MRSA DNA, Nares Negative    SARS-CoV-2 (COVID-19), PCR Nasopharynx [[id number]]  (Normal) Collected: 08/02/22 1633    Specimen: Nasopharyngeal Swab from Nasopharynx Updated: 08/02/22 1807    Narrative:      The following orders were created for panel order SARS-CoV-2 (COVID-19), PCR Nasopharynx.  Procedure                               Abnormality         Status                     ---------                               -----------         ------                     SARS-CoV-2 (COVID-19), P...[[id number]]  Normal              Final result                 Please view results for these tests on the individual orders.    SARS-CoV-2 (COVID-19), PCR Nasopharynx [[id number]]  (Normal) Collected: 08/02/22 1633    Specimen: Nasopharyngeal Swab from Nasopharynx Updated: 08/02/22 1807     SARS-CoV-2 (COVID-19) Negative    Narrative:      Testing performed using real-time PCR for detection of COVID-19. EUA approved validation studies performed on site.     Blood Culture Blood, Venous [[id number]]  (Normal) Collected: 08/02/22 1628    Specimen: Blood, Venous Updated: 08/07/22 1901     Culture No growth at 120 hours    Blood Culture Blood, Venous [[id number]]  (Normal) Collected: 08/02/22 1628    Specimen: Blood, Venous Updated: 08/07/22 1901     Culture No  "growth at 120 hours    Anaerobic Culture / Smear (includes Aerobic Culture) [[id number]]  (Abnormal)  (Susceptibility) Collected: 08/02/22 1536    Specimen: Abscess Fluid from Buttock, Left Updated: 08/07/22 0824     Culture **Positive Culture**      2+ Escherichia coli      2+ Streptococcus anginosus      2+ Enterobacter cloacae Complex      Mixed Anaerobic Growth     Gram Stain Result 3+ WBC      2+ Gram positive cocci      2+ Gram positive bacilli    SARS-CoV-2 (COVID-19), PCR Nasopharynx [[id number]]  (Normal) Collected: 07/25/22 1341    Specimen: Nasopharyngeal Swab from Nasopharynx Updated: 07/25/22 1525    Narrative:      The following orders were created for panel order SARS-CoV-2 (COVID-19), PCR Nasopharynx.  Procedure                               Abnormality         Status                     ---------                               -----------         ------                     SARS-CoV-2 (COVID-19), P...[[id number]]  Normal              Final result                 Please view results for these tests on the individual orders.    SARS-CoV-2 (COVID-19), PCR Nasopharynx [[id number]]  (Normal) Collected: 07/25/22 1341    Specimen: Nasopharyngeal Swab from Nasopharynx Updated: 07/25/22 1525     SARS-CoV-2 (COVID-19) Negative            Imaging  Reviewed    Physicial Exam  Visit Vitals  /70   Pulse 84   Temp 36.1 °C (97 °F) (Oral)   Resp 18   Ht 1.854 m (6' 1\")   Wt 94.1 kg (207 lb 7.3 oz)   SpO2 95%   BMI 27.37 kg/m²     General appearance: alert, cooperative, no acute distress  Lungs: Unlabored respirations, symmetric chest expansion  Heart: regular rate and rhythm  Abdomen: soft, non-tender to palpation, non-distended. Ostomy with stool and gas  : uncircumcised penis, perry catheter draining yellow urine, multiple necrotic areas on foreskin. Perineal wound is clean without necrosis  Extremities: edematous lower extremities  Neurologic: nonfocal              Assessment     89 yo male with pmh of afib, " HTN, ischemic cardiomyopathy who was admitted with necrotizing perineal infection. His perineal infection has been debrided and does not show any sign of necrosis. He has since developed areas of necrosis on his foreskin.        Plan   - we will perform bedside debridement of necrotic penile wounds today  - maintain perry catheter  - rest of care per primary team    Please page [phone] with questions or concerns.    [facility]  Jayme Lockhart MD, PGY2  [facility] Pager #[phone]  [place] Pager #[phone]

## 2022-08-22 NOTE — PATIENT CARE CONFERENCE
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

## 2022-08-23 VITALS
WEIGHT: 207.6 LBS | BODY MASS INDEX: 27.51 KG/M2 | HEART RATE: 88 BPM | OXYGEN SATURATION: 92 % | SYSTOLIC BLOOD PRESSURE: 111 MMHG | RESPIRATION RATE: 18 BRPM | HEIGHT: 73 IN | DIASTOLIC BLOOD PRESSURE: 58 MMHG | TEMPERATURE: 97.4 F

## 2022-08-23 LAB
ALBUMIN SERPL-MCNC: 1.6 G/DL (ref 3.4–5)
ALP SERPL-CCNC: 216 IU/L (ref 35–126)
ALT SERPL-CCNC: 43 IU/L (ref 16–63)
ANION GAP SERPL CALC-SCNC: 5 MEQ/L (ref 3–15)
APTT PPP: 31 SEC (ref 23–35)
AST SERPL-CCNC: 113 IU/L (ref 15–41)
BILIRUB SERPL-MCNC: 0.6 MG/DL (ref 0.3–1.2)
BUN SERPL-MCNC: 26 MG/DL (ref 8–20)
CA-I BLD-SCNC: 1.11 MMOL/L (ref 1.15–1.27)
CALCIUM SERPL-MCNC: 7.6 MG/DL (ref 8.9–10.3)
CHLORIDE SERPL-SCNC: 105 MEQ/L (ref 98–109)
CO2 SERPL-SCNC: 26 MEQ/L (ref 22–32)
CREAT SERPL-MCNC: 0.7 MG/DL (ref 0.8–1.3)
ERYTHROCYTE [DISTWIDTH] IN BLOOD BY AUTOMATED COUNT: 17.1 % (ref 11.6–14.4)
GFR SERPL CREATININE-BSD FRML MDRD: >60 ML/MIN/1.73M*2
GLUCOSE BLD-MCNC: 135 MG/DL (ref 70–99)
GLUCOSE BLD-MCNC: 135 MG/DL (ref 70–99)
GLUCOSE BLD-MCNC: 151 MG/DL (ref 70–99)
GLUCOSE BLD-MCNC: 86 MG/DL (ref 70–99)
GLUCOSE SERPL-MCNC: 88 MG/DL (ref 70–99)
HCT VFR BLDCO AUTO: 30.4 % (ref 40.1–51)
HGB BLD-MCNC: 10.1 G/DL (ref 13.7–17.5)
INR PPP: 1.3
MAGNESIUM SERPL-MCNC: 2 MG/DL (ref 1.8–2.5)
MCH RBC QN AUTO: 34.5 PG (ref 28–33.2)
MCHC RBC AUTO-ENTMCNC: 33.2 G/DL (ref 32.2–36.5)
MCV RBC AUTO: 103.8 FL (ref 83–98)
PDW BLD AUTO: 10.2 FL (ref 9.4–12.4)
PHOSPHATE SERPL-MCNC: 2.9 MG/DL (ref 2.4–4.7)
PLATELET # BLD AUTO: 171 K/UL (ref 150–350)
POCT TEST: ABNORMAL
POCT TEST: NORMAL
POTASSIUM SERPL-SCNC: 4 MEQ/L (ref 3.6–5.1)
PROT SERPL-MCNC: 5.6 G/DL (ref 6–8.2)
PROTHROMBIN TIME: 15.8 SEC (ref 12.2–14.5)
RBC # BLD AUTO: 2.93 M/UL (ref 4.5–5.8)
SODIUM SERPL-SCNC: 136 MEQ/L (ref 136–144)
WBC # BLD AUTO: 7.16 K/UL (ref 3.8–10.5)

## 2022-08-23 PROCEDURE — 25000000 HC PHARMACY GENERAL

## 2022-08-23 PROCEDURE — 85027 COMPLETE CBC AUTOMATED: CPT | Performed by: STUDENT IN AN ORGANIZED HEALTH CARE EDUCATION/TRAINING PROGRAM

## 2022-08-23 PROCEDURE — 25800000 HC PHARMACY IV SOLUTIONS

## 2022-08-23 PROCEDURE — 80053 COMPREHEN METABOLIC PANEL: CPT | Performed by: STUDENT IN AN ORGANIZED HEALTH CARE EDUCATION/TRAINING PROGRAM

## 2022-08-23 PROCEDURE — 63600000 HC DRUGS/DETAIL CODE: Performed by: SURGERY

## 2022-08-23 PROCEDURE — 99024 POSTOP FOLLOW-UP VISIT: CPT | Performed by: COLON & RECTAL SURGERY

## 2022-08-23 PROCEDURE — 99232 SBSQ HOSP IP/OBS MODERATE 35: CPT | Performed by: INTERNAL MEDICINE

## 2022-08-23 PROCEDURE — 85610 PROTHROMBIN TIME: CPT | Performed by: STUDENT IN AN ORGANIZED HEALTH CARE EDUCATION/TRAINING PROGRAM

## 2022-08-23 PROCEDURE — 85730 THROMBOPLASTIN TIME PARTIAL: CPT

## 2022-08-23 PROCEDURE — 36415 COLL VENOUS BLD VENIPUNCTURE: CPT | Performed by: SURGERY

## 2022-08-23 PROCEDURE — 92526 ORAL FUNCTION THERAPY: CPT | Mod: GN

## 2022-08-23 PROCEDURE — 84100 ASSAY OF PHOSPHORUS: CPT | Performed by: STUDENT IN AN ORGANIZED HEALTH CARE EDUCATION/TRAINING PROGRAM

## 2022-08-23 PROCEDURE — 82330 ASSAY OF CALCIUM: CPT | Performed by: SURGERY

## 2022-08-23 PROCEDURE — 63700000 HC SELF-ADMINISTRABLE DRUG: Performed by: SURGERY

## 2022-08-23 PROCEDURE — 63700000 HC SELF-ADMINISTRABLE DRUG: Performed by: PHYSICIAN ASSISTANT

## 2022-08-23 PROCEDURE — 83735 ASSAY OF MAGNESIUM: CPT | Performed by: STUDENT IN AN ORGANIZED HEALTH CARE EDUCATION/TRAINING PROGRAM

## 2022-08-23 PROCEDURE — 200200 PR NO CHARGE: Performed by: COLON & RECTAL SURGERY

## 2022-08-23 RX ORDER — SACUBITRIL AND VALSARTAN 24; 26 MG/1; MG/1
0.5 TABLET, FILM COATED ORAL 2 TIMES DAILY
Qty: 30 TABLET | Refills: 0 | Status: SHIPPED | OUTPATIENT
Start: 2022-08-23 | End: 2022-09-22

## 2022-08-23 RX ORDER — SACUBITRIL AND VALSARTAN 24; 26 MG/1; MG/1
0.5 TABLET, FILM COATED ORAL 2 TIMES DAILY
Status: DISCONTINUED | OUTPATIENT
Start: 2022-08-23 | End: 2022-08-23 | Stop reason: HOSPADM

## 2022-08-23 RX ADMIN — DAKIN'S SOLUTION 0.125% (QUARTER STRENGTH): 0.12 SOLUTION at 08:40

## 2022-08-23 RX ADMIN — ASPIRIN 81 MG: 81 TABLET, CHEWABLE ORAL at 11:58

## 2022-08-23 RX ADMIN — INSULIN HUMAN 4 UNITS: 100 INJECTION, SOLUTION PARENTERAL at 00:34

## 2022-08-23 RX ADMIN — SACUBITRIL AND VALSARTAN 0.5 TABLET: 24; 26 TABLET, FILM COATED ORAL at 12:01

## 2022-08-23 RX ADMIN — HEPARIN SODIUM 5000 UNITS: 5000 INJECTION, SOLUTION INTRAVENOUS; SUBCUTANEOUS at 14:11

## 2022-08-23 RX ADMIN — ATORVASTATIN CALCIUM 40 MG: 40 TABLET, FILM COATED ORAL at 18:23

## 2022-08-23 RX ADMIN — COLLAGENASE SANTYL 1 APPLICATION.: 250 OINTMENT TOPICAL at 08:40

## 2022-08-23 RX ADMIN — SODIUM PHOSPHATE, MONOBASIC, MONOHYDRATE 10 MMOL: 276; 142 INJECTION, SOLUTION INTRAVENOUS at 14:04

## 2022-08-23 RX ADMIN — LANSOPRAZOLE 30 MG: KIT at 08:43

## 2022-08-23 RX ADMIN — INSULIN HUMAN 4 UNITS: 100 INJECTION, SOLUTION PARENTERAL at 18:24

## 2022-08-23 RX ADMIN — INSULIN HUMAN 4 UNITS: 100 INJECTION, SOLUTION PARENTERAL at 12:03

## 2022-08-23 RX ADMIN — LEVOTHYROXINE SODIUM 100 MCG: 0.1 TABLET ORAL at 05:10

## 2022-08-23 RX ADMIN — CLOPIDOGREL BISULFATE 75 MG: 75 TABLET ORAL at 12:02

## 2022-08-23 RX ADMIN — TORSEMIDE 10 MG: 20 TABLET ORAL at 08:41

## 2022-08-23 RX ADMIN — HEPARIN SODIUM 5000 UNITS: 5000 INJECTION, SOLUTION INTRAVENOUS; SUBCUTANEOUS at 05:10

## 2022-08-23 ASSESSMENT — COGNITIVE AND FUNCTIONAL STATUS - GENERAL
REMEMBERING TO TAKE MEDICATION: 1 - UNABLE
TAKING CARE OF COMPLICATED TASKS: 1 - UNABLE
FOLLOWS FAMILIAR CONVERSATION: 3 - A LITTLE
UNDERSTANDING 10 TO 15 MIN SPEECH: 3 - A LITTLE
REMEMBERING WHERE THINGS ARE: 2 - A LOT
REMEMBERING 5 ERRANDS WITH NO LIST: 2 - A LOT

## 2022-08-23 NOTE — PROGRESS NOTES
General Surgery Daily Progress Note    Subjective      8/22 PM: urology preformed bedside debridement for necrotic penile tissue. The amount of necrotic tissue encountered during the procedure was more extensive initially anticipated, so they decided to plan for further debridement and circumcision in the OR today (08/23). The penis was carefully wrapped with Xeroform dressing, 4 x 4's and Kerlix.   8/23 AM: Patient seen laying in bed. He's in good spirits. He says he's having pain but he doesn't want more pain mdication at this moment. The kerlix dressing covering his penis was saturated mostly serous drainage (minimal SS).  Semi-solid brown stool seen in ostomy bag. DHT in place. Denies chest pain, fever, chills, nausea, vomiting.    Objective     Vital signs in last 24 hours:  Temp:  [35.1 °C (95.2 °F)-36.6 °C (97.9 °F)] 35.1 °C (95.2 °F)  Heart Rate:  [] 82  Resp:  [18] 18  BP: (109-164)/(57-79) 121/60      Intake/Output Summary (Last 24 hours) at 8/23/2022 0820  Last data filed at 8/23/2022 0100  Gross per 24 hour   Intake 1410 ml   Output 1945 ml   Net -535 ml     Physical Exam    General appearance: AA&O x4, comfortable, in good spirits and cheerful  ENT: DHT secured at nares  Neck: no JVD  Lungs: Normal respiratory effort, 95% on room air. No cough appreciated  Heart:  regular rate  Abdomen: stoma pink and protruding, productive for gas and semi-solid brown stool. Abdomen soft, nondistended, non-tender and not tympanitic.   Genito-rectal: Penis is wrapped with kerlix and xeroform. Dressing is wet with serosanguinous discharge. No active bleeding. The kathleen-rectal packing is in place with minimal serosanguinous drainage  Skin: edematous scrotum and penis, kerlix dressing saturated with serous drainage   Neurologic: Grossly normal    VTE Assessment: I have reassessed and the patient's VTE risk and treatment plan is appropriate.    Labs:  CBC Results       08/23/22 08/22/22 08/21/22     0519 0529 0815     WBC 7.16 6.83 6.77    RBC 2.93 3.04 3.27    HGB 10.1 10.5 11.4    HCT 30.4 31.2 33.9    .8 102.6 103.7    MCH 34.5 34.5 34.9    MCHC 33.2 33.7 33.6     161 170        CMP Results       08/23/22 08/22/22 08/21/22     0519 0529 0815     137 136    K 4.0 3.9 3.9    Cl 105 104 104    CO2 26 26 24    Glucose 88 133 157    BUN 26 27 28    Creatinine 0.7 0.8 0.8    Calcium 7.6 7.6 7.7    Anion Gap 5 7 8     117 124    ALT 43 43 45    Albumin 1.6 1.6 1.7    EGFR >60.0 >60.0 >60.0          Imaging  No new imaging in the last 24 hrs       Assessment/Plan   89 y/o male with PMH of biventricular heart failure, HTN, Afib, CAD who is now s/p EUA wound debridement of gluteal NSTI on 8/2 and diverting loop sigmoid colostomy on 8/3.    Extubated on 8/13  Now COVID positive as of 8/18, asymptomatic    Plan  - multi-modal pain control  - COVID-19 positive - covid isolation will end on 8/28 as long as patient is asymptomatic  - f/u urology re: further debridement of penis  - f/u morning labs, replete electrolytes as needed  - consider out-patient PEG once patient has recovered from current illness / operations if unable to return to normal po intake despite aggressive SLP therapy  - f/u endo  - continue BID packing changes to wound with santyl and dakins per nursing.   - plan for d/c to LTAC    - appreciate all consultant recs.      Addendum 10:29    -discussed with urology - evaluated by Dr. Martinez, no further debridement needs. Cancelled case for today and will restart tube feeds  -Urology Ok'd asa/plavix restart  -Urology will exchange FC prior to discharge.  Will need follow up with urology in 1-2 weeks after discharge for evaluation of penile healing. Will catheter will remain in place until follow up with urology   -Wound care consult for new penile dressing recommendations.     Emilie Alvarez,   General Surgery Resident  Please page Wilmer [phone] with any questions or concerns.

## 2022-08-23 NOTE — PROGRESS NOTES
Patient:  Samy Elena  Location:  [place] [address]  MRN:  [id number]  Today's date:  8/23/2022  Speech Pathology: Therapy session    SLP Diagnosis: Known oropharyngeal dysphagia per initialy VFSS, dysphonia s/p prolonged ETT    Recommendations:  1. Strict NPO, medication and nutrition via DHT  2. Strict aspiration precautions including frequent and stringent oral care with suction PRN, HOB > 30 degrees  3. FOR COMFORT, with RN supervision, single ice chip (1-3/hour) AFTER ORAL CARE  4. Palliative care is currently following patient, consider GOC discussion to outline GOC (feeding at known risk vs consideration for longer-term alt source for nutrition).  5.  SLP for ongoing dysphagia follow-up. Consider repeat VFSS in next 1-3 days if appropriate.       Summary/Handoff:  Daily Outcome Statement: Dysphagia follow-up completed at the bedside.  Patient no longer n.p.o. for penile debridement.  SLP was cleared to assess patient at the bedside.  Patient with Dobbhoff tube feeds running.  Patient and SLP reviewed video swallow, recommendations for exercises-c/f poor carryover without SLP present. Trials with water via tsp, single straw sip with training in breath hold with goal of trialing during repeat VFSS. Pt with no overt s/sx of aspiration, cannot rule-out silen aspiration. Goa for repeat VFSS to assess if pt is appropriate for initiation of PO diet, possibly 8/24 per schedule as pt is COVID+. SLP for ongoing follow-up.      Session Notes:     GRBAS:   Grade 1   Roughness 1   Breathiness 1   Asthenia 1   Strain 0            Dietary Orders   (From admission, onward)             Start     Ordered    08/23/22 0838  Tube Feed with NPO Isosource 1.5; Intermittent; 55; 22; Water; 40; Every 1 hour; one; RD/LDN may adjust order; AM Snack  (Tube Feed with NPO Diet Orders with insertion and maintenance panels)  Diet effective now        Comments: Hold for 1 hour pre and post synthroid administration  "  Question Answer Comment   Tube Feeding Formula (LMC): Isosource 1.5    Administration Type Intermittent    Tube Feeding Intermittent rate (mL/hr): 55    Run Over (Hours) 22    Flush type: Water    Flush amount (mL): 40    Flush frequency: Every 1 hour    Protein Supplement (packet): one    Delegation of Authority. Diet orders written by PA/CRNPs may not be adjusted by RD/LDNs. RD/LDN may adjust order    Meal period (AM Snack required for CBORD, do not remove: Not clinically relevant) AM Snack       \"And\" Linked Group Details    08/23/22 0839    08/18/22 1121  Diet message  Once        Comments: Please send isosource 1.5 please and thank you    08/18/22 1121                Results from last 7 days   Lab Units 08/23/22  0519 08/22/22  0529 08/21/22  0815   WBC K/uL 7.16 6.83 6.77   HEMOGLOBIN g/dL 10.1* 10.5* 11.4*   HEMATOCRIT % 30.4* 31.2* 33.9*   PLATELETS K/uL 171 161 170          RN cleared SLP to assess/work with patient as no medical issues identified to preclude this therapy session. Following completion of session, pt remained in bed as they were found with call bell in reach.  Nurse was made aware of patients performance and any changes in status (if applicable).      Samy is a 89 y.o. 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    Past Medical History  Samy has a past medical history of Atrial fibrillation (CMS/HCC), Disease of thyroid gland, GI (gastrointestinal bleed), Hypertension, and Myocardial infarction (CMS/HCC).    Per PMR: CAD status post CABG, CHF, tricuspid regurgitation and pulmonary hypertension and history of GI bleed.  Hospitalized 8-2-2022 with for near gangrene/necrotizing fasciitis status post surgical debridement and washout 8-2-2022 subsequent laparoscopic sigmoid colostomy and repeat washout 8-3-2022.  Complicated by sepsis and shock requiring pressor support with respiratory failure mechanical " ventilation through 8-.  Found to be dysarthric and ataxic with delirium.  Brain MRI negative for acute event    History of Present Illness   Admit from OP MD's office 8/2 for denise's gangrene. s/p EUA wound debridement of gluteal NSTI on 8/2 and diverting loop sigmoid colostomy on 8/3  He was then admitted to the surgical ICU where he was intubated and had transient pressor requirements. ETT 8/3-8/12, extubated 8/13. Cleared for thick liquids and puree.    C/f stroke 8/17, MRI negative for acute infarct or hemorrhage  COVID (+) 8/18, transfer to surgical step-down      SLP Pain    Date/Time Pain Type Rating: Rest Rating: Activity [facility]   08/23/22 0926 Pain Assessment 0 - no pain 0 - no pain KAYLENE          Prior Living Environment    Flowsheet Row Most Recent Value   Living Environment Comment Lives alone in 2 SH with TN on first floor and bed/bath upstairs. Tub shower. Sons installed stair glide last week.        Prior Level of Function    Flowsheet Row Most Recent Value   Dominant Hand right   Eating independent   Communication difficulty speaking (not related to language barrier)  [per RN, pt's famiyl reports dysarthria PTA]   Swallowing difficulty swallowing foods   Baseline Diet/Method of Nutritional Intake no diet restrictions   Past History of Dysphagia Pt with no hx of dysphagia prior to admission   Prior Level of Function Comment Mod(i) for BADLs and mobility using RW. Has  for meals and IADLs. Sons assist PRN.   Assistive Device Currently Used at Home cane, straight, walker, front-wheeled           SLP Evaluation and Treatment - 08/23/22 0926        SLP Time Calculation    Start Time 0926     Stop Time 0936     Time Calculation (min) 10 min        Session Details    Document Type daily treatment/progress note     Mode of Treatment speech language pathology;individual therapy        General Information    Patient Profile Reviewed yes     Patient/Family/Caregiver Comments/Observations MD  reached out to SLP to assess, no longer NPO for debridement, tube feeds running     General Observations of Patient Pt received in bed, reviewed swallow exercises.     Existing Precautions/Restrictions aspiration;fall;contact;droplet;NPO     Limitations/Impairments safety/cognitive;swallowing        Cognition/Psychosocial    Orientation Status (Cognition) oriented x 3     Comment, Cognition Reduced recall, carryover of swallow exercises        Vocal Quality/Secretion Management (Oral Motor)    Comment, Vocal Quality/Secretion Management (Oral Motor) appears mildly improved?        Motor Speech    Comment, Motor Speech Intervention dysarthria persists        Auditory Comprehension    Comment, Intervention (Auditory Comprehension) pt able to follow simple commands        Verbal Expression    Comment, Intervention (Verbal Expression) pt is able to state simple wants and needs        Functional Communication Measures    FCM: Swallowing 2-->Level 2        General Swallowing Observations    Current Diet/Method of Nutritional Intake NPO;nasogastric tube (NG)     Signs/Symptoms of Aspiration (Current Diet) concerns for silent aspiration     Respiratory Support (General Swallowing Observations) none     Comment, Secretions/Suctioning oral care completed     Comment, General Swallowing Observations Pt re-ed in breath hold, trials with tsp and single straw sips. Plan for repeat VFSS in near future        Food and Liquid Trials (NIS)    Patient Positioning HOB elevated (specify degrees)     Oral Intake/Feeding Performance oral trials administered by therapist     Liquid Consistencies Evaluated thin liquids     Thin Liquids use of teaspoon;straw sips     Comment, Thin Liquids tsp, single starw sip with cue fo rbreath hold-no overt ssx of aspiration, cannot rule-out silent aspiration at the bedside     Food Consistencies Evaluated --   deferred    Oral Preparatory Phase of Swallow mild impairment;bolus cohesion decreased;suspect  posterior bolus leak     Oral Phase of Swallow mild impairment;delayed anterior-posterior transit;suspect posterior bolus leak     Pharyngeal Phase of Swallow delayed swallow reflex initiation     Comment, Pharyngeal Phase no overt s/sx of aspiration, cannot rule-out silent aspiration     Esophageal Phase of Swallow no clinical symptoms     Comment repeat VFSS 8/24?        Swallowing Recommendations    Diet Consistency Recommendations NPO     Medication Administration non-oral route     Instrumental Assessment Recommendations VFSS (videofluroscopic swallowing study)     Recommend VFSS (Comment) repeat VFSS 8/24-as able     Comment, Swallowing Recommendations Strict aspiration precautions including 1:1 supervision, frequent and stringent oral are with suction PRN        Swallowing Intervention    Dysphagia/Swallowing Interventions monitor tolerance of;advanced diet/liquid texture trials;pharyngeal therapeutic exercise program        AM-PAC (TM) - Cognition (Current Function)    Following/understanding a 10-15 minute speech or presentation? 3 - A little     Understanding familiar people during ordinary conversations? 3 - A little     Remembering to take medications at the appropriate time? 1 - Unable     Remembering where things were placed or put away? 2 - A lot     Remembering a list of 3 or 4 errands without writing it down? 2 - A lot     Taking care of complicated tasks? 1 - Unable     AM-PAC (TM) Cognition Score 12        Assessment/Plan (SLP)    Daily Outcome Statement Dysphagia follow-up completed at the bedside.  Patient no longer n.p.o. for penile debridement.  SLP was cleared to assess patient at the bedside.  Patient with Dobbhoff tube feeds running.  Patient and SLP reviewed video swallow, recommendations for exercises-c/f poor carryover without SLP present. Trials with water via tsp, single straw sip with training in breath hold with goal of trialing during repeat VFSS. Pt with no overt s/sx of  aspiration, cannot rule-out silen aspiration. Goa for repeat VFSS to assess if pt is appropriate for initiation of PO diet, possibly 8/24 per schedule as pt is COVID+. SLP for ongoing follow-up.     Rehab Potential fair, will monitor progress closely     Therapy Frequency 3-5 times/wk     Planned Therapy Interventions dysphagia therapy               SLP Assessment/Plan    Flowsheet Row Most Recent Value   SLP Diagnosis Known oropharyngeal dysphagia per initialy VFSS, dysphonia s/p prolonged ETT at 08/23/2022 0926   Patient/Family Therapy Goal Statement to eat and drink at 08/23/2022 0926                    SLP Goals    Flowsheet Row Most Recent Value   Oral Nutrition/Hydration Goal 1    Activity effective/safe/independent, oral nutrition/hydration, use of swallowing techniques, management of texture/viscosity at 08/13/2022 1026   Time Frame long-term goal (LTG), by discharge at 08/13/2022 1026   Strategies/Barriers ETT x9 days, high oxygen demands at 08/13/2022 1026   Progress/Outcome goal ongoing at 08/23/2022 0926

## 2022-08-23 NOTE — PROGRESS NOTES
Cardiology Progress Note   [facility]    [facility]  [address]  [address] [place] [address]    TEL  [phone]  [facility]       Patient: Samy Elena  MRN: [id number]  : 10/6/1930  Admission Date: 2022   Consult Date: 22  Reason for Consult: H/o HFrEF, PH, atrial fibrillation -postop management   Outpatient Cardiologist: Dr. Yovanny Cruz ( office visit 2022)     Interval History:   Seen and examined early this am.    States he feels better today, resting comfortably.       HPI:   Samy Elena is a 89 y.o. 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    Cardiology is being consulted for management recommendations given his extensive cardiac history.     Her chart review, the patient had a recent admission at the end of July with changes in speech. CVA was ruled out at the time of that admission .symptoms was attributed to  postural/orthostatic changes related to symptomatic hypotension. Patient was discharged to rehabilitation unit where he developed  buttocks and pubic pain.He was referred to be seen by surgery as outpatient that lead to current admission.       Past Medical History:   Diagnosis Date   • Atrial fibrillation (CMS/HCC)    • Disease of thyroid gland    • GI (gastrointestinal bleed)    • Hypertension    • Myocardial infarction (CMS/HCC)           Past Surgical History:   Procedure Laterality Date   • BYPASS GRAFT         Social History     Socioeconomic History   • Marital status:      Spouse name: Not on file   • Number of children: Not on file   • Years of education: Not on file   • Highest education level: Not on file   Occupational History   • Not on file   Tobacco Use   • Smoking status: Never  Smoker   • Smokeless tobacco: Never Used   Substance and Sexual Activity   • Alcohol use: Not on file   • Drug use: Not on file   • Sexual activity: Not on file   Other Topics Concern   • Not on file   Social History Narrative   • Not on file     Social Determinants of Health     Financial Resource Strain: Not on file   Food Insecurity: No Food Insecurity   • Worried About Running Out of Food in the Last Year: Never true   • Ran Out of Food in the Last Year: Never true   Transportation Needs: Not on file   Physical Activity: Not on file   Stress: Not on file   Social Connections: Not on file   Intimate Partner Violence: Not on file   Housing Stability: Not on file        History reviewed. No pertinent family history.     Jardiance [empagliflozin]    Current Outpatient Medications   Medication Instructions   • acetaminophen (TYLENOL) 325 mg tablet oral   • acetaminophen (TYLENOL) 650 mg, feeding tube, Every 4 hours PRN   • aspirin 81 mg, feeding tube, Daily   • atorvastatin (LIPITOR) 40 mg, feeding tube, Daily (6p)   • bisacodyL (DULCOLAX) 10 mg suppository rectal   • clopidogreL (PLAVIX) 75 mg, feeding tube, Daily   • coenzyme Q10 (COQ10) 100 mg, oral, Daily   • collagenase (SANTYL) ointment 1 application, Topical, 2 times daily, Apply to buttock wound twice daily, then pack with dakin's soaked gauze.   • empagliflozin (JARDIANCE) 10 mg, oral, Daily   • insulin regular U-100 (HUMULIN R,NOVOLIN R) 4 Units, subcutaneous, Every 6 hours, While on tube feeds   • insulin regular U-100 (HUMULIN R,NOVOLIN R) 3-5 Units, subcutaneous, Every 6 hours PRN, Give in addition to scheduled dose: Dose = 3 units for -280,  Dose = 5 units for -300   • lansoprazole (PREVACID) 30 mg, feeding tube, Daily   • levothyroxine (SYNTHROID) 100 mcg, feeding tube, Daily (6:30a)   • metoprolol tartrate (LOPRESSOR) 25 mg, feeding tube, 2 times daily   • multivitamin (THERAGRAN) tablet oral, Daily   • sacubitriL-valsartan (ENTRESTO)  49-51 mg per tablet 1 tablet, oral, 2 times daily   • sildenafiL (pulm.hypertension) (REVATIO) 20 mg, oral, 3 times daily   • simvastatin (ZOCOR) 10 mg, oral, Nightly   • sodium hypochlorite (DAKIN'S, QUARTER-STRENGTH,) 0.125 % solution Topical, Daily, Apply to guaze packing with twice daily packing changes along buttock wound   • spironolactone (ALDACTONE) 25 mg, oral, Daily   • torsemide (DEMADEX) 10-20 mg, feeding tube, Daily, 10 mg if weight is 176-181 lbs, 20 mg if weight is >181 lbs       Scheduled Meds:  • aspirin  81 mg feeding tube Daily   • atorvastatin  40 mg feeding tube Daily (6p)   • clopidogreL  75 mg feeding tube Daily   • collagenase  1 application Topical Daily   • heparin (porcine)  5,000 Units subcutaneous q8h IVÁN   • insulin regular  3-5 Units subcutaneous q6h IVÁN   • insulin regular  4 Units subcutaneous q6h IVÁN   • lansoprazole  30 mg feeding tube Daily   • levothyroxine  100 mcg feeding tube Daily (6:30a)   • metoprolol tartrate  25 mg feeding tube BID   • sodium hypochlorite   Topical Daily   • torsemide  10 mg feeding tube Daily     Continuous Infusions:    PRN Meds:.•  acetaminophen  •  glucose **OR** dextrose **OR** glucagon **OR** dextrose 50 % in water (D50)  •  HYDROmorphone      Intake/Output Summary (Last 24 hours) at 8/23/2022 1028  Last data filed at 8/23/2022 0100  Gross per 24 hour   Intake 1410 ml   Output 1945 ml   Net -535 ml        Weights (last 7 days)     Date/Time Weight    08/23/22 0600 94.2 kg (207 lb 9.6 oz)    08/21/22 0600 94.1 kg (207 lb 7.3 oz)    08/20/22 0400 93.8 kg (206 lb 12.7 oz)    08/18/22 0523 90.8 kg (200 lb 2.8 oz)           Wt Readings from Last 3 Encounters:   08/23/22 94.2 kg (207 lb 9.6 oz)   08/17/22 80.3 kg (177 lb)   08/02/22 80.3 kg (177 lb)        REVIEW OF SYSTEMS:    A complete review of systems limited by sedation.    PHYSICAL EXAMINATION:  Visit Vitals  BP (!) 119/58   Pulse 82   Temp 36.2 °C (97.2 °F) (Axillary)   Resp 18   Ht 1.854 m (6'  "1\")   Wt 94.2 kg (207 lb 9.6 oz)   SpO2 96%   BMI 27.39 kg/m²     Body surface area is 2.2 meters squared.  Body mass index is 27.39 kg/m².  General: in NAD   HEENT: No corneal arcus or xanthelasmas.  Sclerae are anicteric.   Neck: JVP is not elevated. prominent v wave .   Heart: s1s2 audible, no significant murmurs , irregular   Chest: Symmetrical.  Lungs:Breathing sounds audible bl .  Abdomen: soft, nt , colostomy in place   Extremities: No cyanosis or clubbing.no  lower extremity edema.  Distal pulses are easily palpable.  Skin: Warm and dry and well perfused.      Labs   Results from last 7 days   Lab Units 08/23/22  0519 08/22/22  0529 08/21/22  0815   SODIUM mEQ/L 136 137 136   POTASSIUM mEQ/L 4.0 3.9 3.9   CHLORIDE mEQ/L 105 104 104   CO2 mEQ/L 26 26 24   BUN mg/dL 26* 27* 28*   CREATININE mg/dL 0.7* 0.8 0.8   CALCIUM mg/dL 7.6* 7.6* 7.7*   ALBUMIN g/dL 1.6* 1.6* 1.7*   BILIRUBIN TOTAL mg/dL 0.6 0.8 1.0   ALK PHOS IU/L 216* 215* 212*   ALT IU/L 43 43 45   AST IU/L 113* 117* 124*   GLUCOSE mg/dL 88 133* 157*       No results found for: HSTROPONINI    Results from last 7 days   Lab Units 08/23/22  0519 08/22/22  0529 08/21/22  0815   WBC K/uL 7.16 6.83 6.77   HEMOGLOBIN g/dL 10.1* 10.5* 11.4*   HEMATOCRIT % 30.4* 31.2* 33.9*   PLATELETS K/uL 171 161 170       Lab Results   Component Value Date    CHOL 94 07/23/2022    HDL 23 (L) 07/23/2022    LDLCALC 63 07/23/2022    TRIG 41 07/23/2022    TSH 9.96 (H) 08/14/2022    HGBA1C 6.3 (H) 07/23/2022       Outside records reviewed..    Imaging  CXR reviewed     Cardiac Studies  TTE 04/13/2022 ( outside report , images not available)     •  A complete transthoracic echocardiogram (including 2D, color flow   Doppler, spectral Doppler and M-mode imaging) was performed using the   standard protocol. The study quality was adequate and poor.  •  The left ventricle is normal in size. Endocardium is incompletely   visualized and segmental wall motion abnormalities cannot be " excluded.   Moderately decreased left ventricular ejection fraction. The left   ventricular ejection fraction by visual estimate is 35% to 40%.  •  There is indeterminate diastolic function.  •  The right ventricle is severely dilated. There is moderately to   severely decreased systolic function of the right ventricle.  •  The left atrium is moderately dilated.  •  The right atrium is severely dilated.  •  There is trivial mitral valve leaflet thickening. There is trace mitral   regurgitation. There is no mitral stenosis.  •  The aortic valve is trileaflet. There is mild aortic valve thickening.   There is mild aortic valve calcification. There is no aortic stenosis.   There is mild aortic regurgitation.  •  There is moderate to severe tricuspid regurgitation.  •  The inferior vena cava is dilated (diameter >21 mm) and decreases <50%   in size with inspiration, suggesting an elevated right atrial pressure of   15 mmHg (range 10-20 mm Hg).  •  There is no prior study available for comparison at this organization.    Left Ventricle  The left ventricle is normal in size. Endocardium is incompletely visualized and segmental wall motion abnormalities cannot be excluded. The left ventricular ejection fraction by visual estimate is 35% to 40%. Moderately decreased left ventricular ejection fraction. There is indeterminate diastolic function.    Right Ventricle  The right ventricle is severely dilated. Off axis imaging of the right ventricle, but visually systolic function appears moderate to severely reduced.    Left Atrium  The left atrium is moderately dilated.    Right Atrium  The right atrium is severely dilated.    IVC/SVC  The inferior vena cava is dilated (diameter >21 mm) and decreases <50% in size with inspiration, suggesting an elevated right atrial pressure of 15 mmHg (range 10-20 mm Hg).    Mitral Valve  There is trivial mitral valve leaflet thickening. There is no mitral stenosis. There is trace mitral  regurgitation.    Tricuspid Valve  There is moderate tricuspid annular dilatation. There is no tricuspid stenosis. There is moderate to severe tricuspid regurgitation.    Aortic Valve  The aortic valve is trileaflet. There is mild aortic valve thickening. There is mild aortic valve calcification. There is no aortic stenosis. There is mild aortic regurgitation.    Pulmonic Valve  There is no pulmonic stenosis. There is mild pulmonic valve regurgitation.    Pericardium  There are echocardiographic findings consistent with fat pad.    Aortic Arch/Descending/Abdominal Aorta  The aortic root is normal in size. The proximal segment of the ascending aorta is normal in size.     ECG   07/22/2022   Atrial fibrillation   rbbb   twi on precordial leads, present on ecg 07/2022     Telemetry  Atrial fibrillation , rbbb with ventricular response in , frequent pvcs    Assessment:  This is a 89 y.o. male being consulted for cardiovascular management    #Justin's gangrene:   #Septic shock:   Management per primary service   On abx; id has been following   empagliflozin should be discontinued at the time of discharge. He was recently started on this agent in may 2022      # iCM:  # HFmrEF:  # PH on sildenafil as outpatient:  NYHA class III and he has ACC/AHA Stage C as outpatient previously   His lvef appears 40-45% with severe RV dilation and diastolic dysfunction.   S/p septic shock; extubated and off pressors     - continue diuretic dose to daily torsemide 10 mg for euvolemia  - Please attempt to R/S entresto 12/13 mg BID, this is crushable and can be put in NGT per pharmacy    - Can f/u outpatient with Samy Elena MD ([facility]) for ongoing re-initiation)       # Permanent atrial fibrillation:   # Frequent PVCs:  Rate controlled   [id number], Not on AC as outpatient given history of GIB   - continue metoprolol tartrate 25 mg BID transition to 50 mg XL when taking full diet and NGT is out  - Maintain K>4 ,  Mg > 2     Final recommendations are pending attending co-signature.     Dale Moore MD  8/23/2022

## 2022-08-23 NOTE — PLAN OF CARE
Problem: Adult Inpatient Plan of Care  Goal: Plan of Care Review  Flowsheets (Taken 8/23/2022 1238)  Progress: improving  Plan of Care Reviewed With: son  Outcome Summary: no plan for OR, stable for transfer to LTACH       Per discussion with [facility] Chief Surg Resident Dr. Haddad during CPR this day, pt is s/p bedside debridment with Uro yesterday, no longer planned for OR. Transition of care recommendation is LTACH for wound care, DHT, and aggressive PT/OT/ST. Per update from [facility] Liaison Eden Herring, bed is available today.  SW p/c with son Marko Mcbride who confirmed agreement with plan,  requested additional contact with [facility] for specific facility-related questions. Eden Herring to outreach to son re: same.   Discharge Transportation Vendor: [facility] ([phone], option [phone])  Type of Transportation: basic life support  What day is the transport expected?: 08/23/22  What time is the transport expected?: 1700   Plan d/c to [facility] today.

## 2022-08-23 NOTE — CONSULTS
Wound Ostomy Continence Note    Subjective    HPI Patient is a 89 y.o. 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   Penile shaft with full thickness circumferential wound s/p debridement. Wound was visualized with Urology. Tissue is pink wit beige, non slough. There is moderate amount of serosanguinous drainage, odor free. Dressing removed with bloody drainage, but wound with no active bleeding. Scrotum with edema and when palpated, drainage from wound increases. Fungal rash also noted to groin.     Recommend: Cleanse with wound cleanser or NSS. Apply Silvadene, cover with aquacel, dry dressing and change daily.  Nystatin ointment to groin rash twice daily.     No further topical recommendations. Signing off. Re-consult as needed.             Date: 08/23/22  Signature: Juan Kern RN

## 2022-08-23 NOTE — PROGRESS NOTES
Urology Daily Progress Note    Subjective   Interval History: Penile debridement performed at the bedside yesterday.  Overlying eschar and necrotic appearing tissue sharply dissected until healthy penile skin was encountered.  Patient tolerated the procedure well.  Initially, there were plans this morning to proceed with repeat debridement and possible circumcision in the operating room.  However, after reviewing with attending physician, his wound looks healthy-appearing and well likely heal nicely by secondary intention.  Will catheter has been in place since 8/2/2022.  According to surgical team, patient is nearing discharge and awaiting bed placement at [facility].    Objective     Vital signs in last 24 hours:  Temp:  [35.1 °C (95.2 °F)-36.6 °C (97.9 °F)] 36.2 °C (97.2 °F)  Heart Rate:  [] 82  Resp:  [18] 18  BP: (109-164)/(56-79) 119/58      Intake/Output Summary (Last 24 hours) at 8/23/2022 0912  Last data filed at 8/23/2022 0100  Gross per 24 hour   Intake 1410 ml   Output 1945 ml   Net -535 ml     Intake/Output this shift:  No intake/output data recorded.    Labs  BMP Results       08/23/22 08/22/22 08/21/22     0519 0529 0815     137 136    K 4.0 3.9 3.9    Cl 105 104 104    CO2 26 26 24    Glucose 88 133 157    BUN 26 27 28    Creatinine 0.7 0.8 0.8    Calcium 7.6 7.6 7.7    Anion Gap 5 7 8    EGFR >60.0 >60.0 >60.0        CBC Results       08/23/22 08/22/22 08/21/22     0519 0529 0815    WBC 7.16 6.83 6.77    RBC 2.93 3.04 3.27    HGB 10.1 10.5 11.4    HCT 30.4 31.2 33.9    .8 102.6 103.7    MCH 34.5 34.5 34.9    MCHC 33.2 33.7 33.6     161 170        UA Results       08/08/22     0042    Color Yellow    Clarity Clear    Glucose >=1000    Bilirubin Negative    Ketones Negative    Sp Grav 1.025    Blood Negative    Ph 6.5    Protein +1    Urobilinogen 0.2    Nitrite Negative    Leuk Est Negative    WBC 0 TO 3    RBC 0 TO 4    Bacteria Rare         Comment for Blood at 0042 on  08/08/22: The sensitivity of the occult blood test is equivalent to approximately 4 intact RBC/HPF.    Comment for Leuk Est at 0042 on 08/08/22: Results can be falsely negative due to high specific gravity, some antibiotics, glucose >3 g/dl, or WBC other than neutrophils.        Microbiology Results     Procedure Component Value Units Date/Time    SARS-CoV-2 (COVID-19), PCR Nasopharynx [[id number]]  (Abnormal) Collected: 08/18/22 1156    Specimen: Nasopharyngeal Swab from Nasopharynx Updated: 08/18/22 1355    Narrative:      The following orders were created for panel order SARS-CoV-2 (COVID-19), PCR Nasopharynx.  Procedure                               Abnormality         Status                     ---------                               -----------         ------                     SARS-CoV-2 (COVID-19), P...[[id number]]  Abnormal            Final result                 Please view results for these tests on the individual orders.    SARS-CoV-2 (COVID-19), PCR Nasopharynx [[id number]]  (Abnormal) Collected: 08/18/22 1156    Specimen: Nasopharyngeal Swab from Nasopharynx Updated: 08/18/22 1355     SARS-CoV-2 (COVID-19) Positive    Narrative:      Testing performed using real-time PCR for detection of COVID-19. EUA approved validation studies performed on site.     Sputum culture / smear Expectorated Sputum [[id number]]  (Abnormal) Collected: 08/08/22 0311    Specimen: Expectorated Sputum Updated: 08/10/22 0705    Narrative:      The following orders were created for panel order Sputum culture / smear Expectorated Sputum.  Procedure                               Abnormality         Status                     ---------                               -----------         ------                     Sputum Gram Stain (Lab O...[[id number]]                      Final result               Sputum Culture (Lab Only...[[id number]]  Abnormal            Final result                 Please view results for these tests on the individual  orders.    Sputum Gram Stain (Lab Only) Expectorated Sputum [[id number]] Collected: 08/08/22 0311    Specimen: Expectorated Sputum Updated: 08/08/22 1038     Gram Stain Result 2+ WBC      No Epithelial Cells Seen      2+ Yeast    Sputum Culture (Lab Only) Expectorated Sputum [[id number]]  (Abnormal) Collected: 08/08/22 0311    Specimen: Expectorated Sputum Updated: 08/10/22 0705     Culture **Positive Culture**      4+ Yeast, No Further Identification      No Normal Jennifer    Blood Culture Blood, Venous [[id number]]  (Normal) Collected: 08/08/22 0235    Specimen: Blood, Venous Updated: 08/13/22 0301     Culture No growth at 120 hours    Blood Culture Blood, Venous [[id number]]  (Normal) Collected: 08/08/22 0235    Specimen: Blood, Venous Updated: 08/13/22 0301     Culture No growth at 120 hours    MRSA Screen, Nares Only Nose [[id number]]  (Normal) Collected: 08/02/22 2248    Specimen: Nasal Swab from Nose Updated: 08/03/22 0030     MRSA DNA, Nares Negative    SARS-CoV-2 (COVID-19), PCR Nasopharynx [[id number]]  (Normal) Collected: 08/02/22 1633    Specimen: Nasopharyngeal Swab from Nasopharynx Updated: 08/02/22 1807    Narrative:      The following orders were created for panel order SARS-CoV-2 (COVID-19), PCR Nasopharynx.  Procedure                               Abnormality         Status                     ---------                               -----------         ------                     SARS-CoV-2 (COVID-19), P...[[id number]]  Normal              Final result                 Please view results for these tests on the individual orders.    SARS-CoV-2 (COVID-19), PCR Nasopharynx [[id number]]  (Normal) Collected: 08/02/22 1633    Specimen: Nasopharyngeal Swab from Nasopharynx Updated: 08/02/22 1807     SARS-CoV-2 (COVID-19) Negative    Narrative:      Testing performed using real-time PCR for detection of COVID-19. EUA approved validation studies performed on site.     Blood Culture Blood, Venous [[id number]]  (Normal)  "Collected: 08/02/22 1628    Specimen: Blood, Venous Updated: 08/07/22 1901     Culture No growth at 120 hours    Blood Culture Blood, Venous [[id number]]  (Normal) Collected: 08/02/22 1628    Specimen: Blood, Venous Updated: 08/07/22 1901     Culture No growth at 120 hours    Anaerobic Culture / Smear (includes Aerobic Culture) [[id number]]  (Abnormal)  (Susceptibility) Collected: 08/02/22 1536    Specimen: Abscess Fluid from Buttock, Left Updated: 08/07/22 0824     Culture **Positive Culture**      2+ Escherichia coli      2+ Streptococcus anginosus      2+ Enterobacter cloacae Complex      Mixed Anaerobic Growth     Gram Stain Result 3+ WBC      2+ Gram positive cocci      2+ Gram positive bacilli    SARS-CoV-2 (COVID-19), PCR Nasopharynx [[id number]]  (Normal) Collected: 07/25/22 1341    Specimen: Nasopharyngeal Swab from Nasopharynx Updated: 07/25/22 1525    Narrative:      The following orders were created for panel order SARS-CoV-2 (COVID-19), PCR Nasopharynx.  Procedure                               Abnormality         Status                     ---------                               -----------         ------                     SARS-CoV-2 (COVID-19), P...[[id number]]  Normal              Final result                 Please view results for these tests on the individual orders.    SARS-CoV-2 (COVID-19), PCR Nasopharynx [[id number]]  (Normal) Collected: 07/25/22 1341    Specimen: Nasopharyngeal Swab from Nasopharynx Updated: 07/25/22 1525     SARS-CoV-2 (COVID-19) Negative          Imaging  Reviewed.  See previous hospital notes.    Physicial Exam  Visit Vitals  BP (!) 119/58   Pulse 82   Temp 36.2 °C (97.2 °F) (Axillary)   Resp 18   Ht 1.854 m (6' 1\")   Wt 94.2 kg (207 lb 9.6 oz)   SpO2 96%   BMI 27.39 kg/m²     General appearance: No acute distress  HEENT: Feeding tube in place  Abdomen: Pink stoma with gas and formed stool in ostomy appliance  Genitalia: Penis is uncircumcised, remaining preputial and shaft " skin is pink and healthy-appearing, glans appears pink and healthy, 16 Romansh Will catheter draining yellow urine, scrotal skin is mildly edematous without crepitus or fluctuance  Extremities: extremities normal, warm and well-perfused  Neurologic: Alert, awake and oriented      Assessment   89-year-old male admitted with a necrotizing soft tissue infection of the perianal area.  He subsequently underwent debridement in the operating room and creation of a diverting colostomy.  Postoperatively, he received aggressive volume resuscitation leading to foreskin and penile shaft edema.  This resulted in blistering of his penile skin, which likely caused pressure necrosis resulting in eschars along the penile shaft and foreskin.    He underwent bedside penile debridement 8/22/2022.  Initially, there was consideration for repeat penile debridement in the operating room and possible circumcision.  However, remaining skin appears healthy and given the amount of edema, circumcision may not heal well.  After discussion with attending, he would likely heal better by secondary intention.        Plan   - Continue local wound care.  Wound was dressed with Xeroform, 4 x 4's and Kerlix wrap.  Placed consult for wound ostomy nurse for better wound recommendations.  He will need daily dressing changes at [facility].  - Will catheter was inserted 8/2/2022.  Would maintain Will catheter for now given risk of urine spillage into penile wounds.  Will exchange catheter prior to discharge.  -Ultimately, he should follow-up with urology in the office in 1 to 2 weeks for wound check.    [facility]  Jorge Fry DO PGY-4  Josue Perez Pager #[phone]  Baylee Pager #[phone]

## 2022-08-23 NOTE — PATIENT CARE CONFERENCE
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

## 2022-08-23 NOTE — PLAN OF CARE
Problem: Adult Inpatient Plan of Care  Goal: Plan of Care Review  Outcome: Progressing  Flowsheets (Taken 8/22/2022 8569)  Progress: improving  Plan of Care Reviewed With: patient  Outcome Summary: VSS on rm air.  AAO x4.  pt is scheduled for a penile debridement tomorrow.  NPO at [facility].  pt denies any pain.  Will continue to follow.     Problem: Adult Inpatient Plan of Care  Goal: Patient-Specific Goal (Individualized)  Outcome: Progressing     Problem: Adult Inpatient Plan of Care  Goal: Absence of Hospital-Acquired Illness or Injury  Outcome: Progressing     Problem: Adult Inpatient Plan of Care  Goal: Optimal Comfort and Wellbeing  Outcome: Progressing     Problem: Adult Inpatient Plan of Care  Goal: Readiness for Transition of Care  Outcome: Progressing     Problem: Skin Injury Risk Increased  Goal: Skin Health and Integrity  Outcome: Progressing     Problem: Fall Injury Risk  Goal: Absence of Fall and Fall-Related Injury  Outcome: Progressing     Problem: Restraint, Nonbehavioral (Nonviolent)  Goal: Discontinuation Criteria Achieved  Outcome: Progressing

## 2022-08-23 NOTE — PROGRESS NOTES
Patient: Samy Elena  Location: [facility] [address] [address]  MRN:  [id number]  Today's date:  8/23/2022    Attempted to see patient for therapy. Unable due to medical hold (Pt is NPO for penile debridement. SLP to follow-up. Goal of repeat VFSS in next 1-2 days.).

## 2022-08-24 NOTE — NURSING NOTE
Pt was d/c via stretcher by ambulance with all of his belongings. Maylin was flused prior to leaving. No distress noted prior to leaving facility. Heart monitor was removed.

## 2022-09-30 ENCOUNTER — TELEPHONE (OUTPATIENT)
Dept: SCHEDULING | Facility: CLINIC | Age: 87
End: 2022-09-30
Payer: MEDICARE

## 2022-09-30 NOTE — TELEPHONE ENCOUNTER
Ailyn from [facility] called to cancel new pt appt on 10/04/2022. Pt is not ready and needs to complete phys therapy first.  [phone]

## 2022-10-03 PROCEDURE — 84134 ASSAY OF PREALBUMIN: CPT | Performed by: INTERNAL MEDICINE

## 2022-10-04 ENCOUNTER — TRANSCRIBE ORDERS (OUTPATIENT)
Dept: SCHEDULING | Age: 87
End: 2022-10-04

## 2022-10-04 DIAGNOSIS — R13.12 DYSPHAGIA, OROPHARYNGEAL PHASE: Primary | ICD-10-CM

## 2022-10-11 ENCOUNTER — HOSPITAL ENCOUNTER (OUTPATIENT)
Dept: RADIOLOGY | Facility: HOSPITAL | Age: 87
Discharge: HOME | End: 2022-10-11
Attending: INTERNAL MEDICINE
Payer: MEDICARE

## 2022-10-11 DIAGNOSIS — R13.12 DYSPHAGIA, OROPHARYNGEAL PHASE: ICD-10-CM

## 2022-10-11 PROCEDURE — 92611 MOTION FLUOROSCOPY/SWALLOW: CPT | Mod: GN

## 2022-10-11 PROCEDURE — 74230 X-RAY XM SWLNG FUNCJ C+: CPT

## 2022-10-11 NOTE — PROCEDURES
Video swallow study was completed. Please refer to the imaging section for full report and recommendations.        Results for orders placed during the hospital encounter of 10/11/22    FLUOROSCOPY VIDEO SWALLOW WITH SPEECH (Preliminary)  This result has not been signed. Information might be incomplete.    Impression  1. Moderate oral stage dysphagia.  2. Mild to moderate pharyngeal stage dysphagia characterized by swallow delay,  reduced tongue-base retraction, severely reduced to absent epiglottic inversion,  reduced pharyngeal constriction and hyolaryngeal excursion. There was chronic  penetration observed. There was aspiration with consecutive sips of thin liquids  (and a single instance with cup sip of mildly thick liquids) via cup sip  secondary to swallow delay and incomplete laryngeal closure. There was  aspiration observed during the swallow with solids-however, this appeared to be  aspiration of residuals which remained in the pharynx and laryngeal vestibule  rather than of the actual solid.  There was no sensory response to this  aspiration event. There was no aspiration observed with small single cup sip of  thin liquids.  3. The esophagus was not assessed during today's study.      Betsy Benitez PA-C was present for this examination.  Dr. Goyo Burnham agrees only with the radiographic findings.  Specific  swallowing recommendations are solely the purview of the Speech Pathology  Division.  This study was conducted and written by Candace Feldman MS, CCC-SLP/L.

## 2022-10-27 ENCOUNTER — HOSPITAL ENCOUNTER (EMERGENCY)
Facility: HOSPITAL | Age: 87
Discharge: HOME | End: 2022-10-28
Attending: EMERGENCY MEDICINE
Payer: MEDICARE

## 2022-10-27 ENCOUNTER — APPOINTMENT (EMERGENCY)
Dept: RADIOLOGY | Facility: HOSPITAL | Age: 87
End: 2022-10-27
Attending: EMERGENCY MEDICINE
Payer: MEDICARE

## 2022-10-27 DIAGNOSIS — D72.829 LEUKOCYTOSIS, UNSPECIFIED TYPE: ICD-10-CM

## 2022-10-27 DIAGNOSIS — K57.92 DIVERTICULITIS: Primary | ICD-10-CM

## 2022-10-27 LAB
ANION GAP SERPL CALC-SCNC: 11 MEQ/L (ref 3–15)
BASOPHILS # BLD: 0.04 K/UL (ref 0.01–0.1)
BASOPHILS NFR BLD: 0.3 %
BUN SERPL-MCNC: 27 MG/DL (ref 8–20)
CALCIUM SERPL-MCNC: 8.5 MG/DL (ref 8.9–10.3)
CHLORIDE SERPL-SCNC: 103 MEQ/L (ref 98–109)
CO2 SERPL-SCNC: 27 MEQ/L (ref 22–32)
CREAT SERPL-MCNC: 0.9 MG/DL (ref 0.8–1.3)
DIFFERENTIAL METHOD BLD: ABNORMAL
EOSINOPHIL # BLD: 0.1 K/UL (ref 0.04–0.54)
EOSINOPHIL NFR BLD: 0.7 %
ERYTHROCYTE [DISTWIDTH] IN BLOOD BY AUTOMATED COUNT: 16.6 % (ref 11.6–14.4)
GFR SERPL CREATININE-BSD FRML MDRD: >60 ML/MIN/1.73M*2
GLUCOSE SERPL-MCNC: 156 MG/DL (ref 70–99)
HCT VFR BLDCO AUTO: 38.2 % (ref 40.1–51)
HGB BLD-MCNC: 12.9 G/DL (ref 13.7–17.5)
IMM GRANULOCYTES # BLD AUTO: 0.07 K/UL (ref 0–0.08)
IMM GRANULOCYTES NFR BLD AUTO: 0.5 %
LACTATE SERPL-SCNC: 2.3 MMOL/L (ref 0.4–2)
LYMPHOCYTES # BLD: 1.03 K/UL (ref 1.2–3.5)
LYMPHOCYTES NFR BLD: 7.2 %
MCH RBC QN AUTO: 37.1 PG (ref 28–33.2)
MCHC RBC AUTO-ENTMCNC: 33.8 G/DL (ref 32.2–36.5)
MCV RBC AUTO: 109.8 FL (ref 83–98)
MONOCYTES # BLD: 1.13 K/UL (ref 0.3–1)
MONOCYTES NFR BLD: 7.9 %
NEUTROPHILS # BLD: 11.95 K/UL (ref 1.7–7)
NEUTS SEG NFR BLD: 83.4 %
NRBC BLD-RTO: 0 %
PDW BLD AUTO: 9.5 FL (ref 9.4–12.4)
PLATELET # BLD AUTO: 217 K/UL (ref 150–350)
POTASSIUM SERPL-SCNC: 4.6 MEQ/L (ref 3.6–5.1)
RBC # BLD AUTO: 3.48 M/UL (ref 4.5–5.8)
SODIUM SERPL-SCNC: 141 MEQ/L (ref 136–144)
WBC # BLD AUTO: 14.32 K/UL (ref 3.8–10.5)

## 2022-10-27 PROCEDURE — 36415 COLL VENOUS BLD VENIPUNCTURE: CPT | Performed by: EMERGENCY MEDICINE

## 2022-10-27 PROCEDURE — 99284 EMERGENCY DEPT VISIT MOD MDM: CPT | Mod: 25

## 2022-10-27 PROCEDURE — 25500000 HC DRUGS/INCIDENT RAD: Performed by: EMERGENCY MEDICINE

## 2022-10-27 PROCEDURE — 83605 ASSAY OF LACTIC ACID: CPT | Performed by: EMERGENCY MEDICINE

## 2022-10-27 PROCEDURE — 83605 ASSAY OF LACTIC ACID: CPT | Mod: 91 | Performed by: EMERGENCY MEDICINE

## 2022-10-27 PROCEDURE — A9698 NON-RAD CONTRAST MATERIALNOC: HCPCS | Performed by: EMERGENCY MEDICINE

## 2022-10-27 PROCEDURE — G1004 CDSM NDSC: HCPCS

## 2022-10-27 PROCEDURE — 63600105 HC IODINE BASED CONTRAST: Performed by: EMERGENCY MEDICINE

## 2022-10-27 PROCEDURE — 3E0337Z INTRODUCTION OF ELECTROLYTIC AND WATER BALANCE SUBSTANCE INTO PERIPHERAL VEIN, PERCUTANEOUS APPROACH: ICD-10-PCS | Performed by: EMERGENCY MEDICINE

## 2022-10-27 PROCEDURE — 25800000 HC PHARMACY IV SOLUTIONS: Performed by: EMERGENCY MEDICINE

## 2022-10-27 PROCEDURE — 200200 PR NO CHARGE: Performed by: SURGERY

## 2022-10-27 PROCEDURE — 82310 ASSAY OF CALCIUM: CPT | Performed by: EMERGENCY MEDICINE

## 2022-10-27 PROCEDURE — 85025 COMPLETE CBC W/AUTO DIFF WBC: CPT | Performed by: EMERGENCY MEDICINE

## 2022-10-27 PROCEDURE — 96360 HYDRATION IV INFUSION INIT: CPT | Mod: 59

## 2022-10-27 RX ADMIN — SODIUM CHLORIDE 2000 ML: 9 INJECTION, SOLUTION INTRAVENOUS at 22:42

## 2022-10-27 RX ADMIN — IOHEXOL 1000 ML: 9 SOLUTION ORAL at 20:18

## 2022-10-27 RX ADMIN — IOHEXOL 80 ML: 350 INJECTION, SOLUTION INTRAVENOUS at 22:35

## 2022-10-27 NOTE — ED PROVIDER NOTES
Emergency Medicine Note  HPI   HISTORY OF PRESENT ILLNESS     Seen and evaluated in ED 31     This is a 89 yo male with hsitory of Afib, HTN, MI (with cabg).   Sent in from nursing facility for evaluation of colostomy evaluation.  There is decreased output into the colostomy, and they are concerned with the stump appearance.  Patient offers no abdominal pain, no fevers or chills.  Appropriate appetite.  Patient does endorse having rectal bowel movements in addition to flatus into the colostomy bag.        History provided by:  Patient        Patient History   PAST HISTORY     Reviewed from Nursing Triage:       Past Medical History:   Diagnosis Date    Atrial fibrillation (CMS/HCC)     Disease of thyroid gland     GI (gastrointestinal bleed)     Hypertension     Myocardial infarction (CMS/HCC)        Past Surgical History:   Procedure Laterality Date    BYPASS GRAFT         No family history on file.    Social History     Tobacco Use    Smoking status: Never    Smokeless tobacco: Never         Review of Systems   REVIEW OF SYSTEMS     Review of Systems      VITALS     ED Vitals    None                      Physical Exam   PHYSICAL EXAM     Physical Exam  Vitals and nursing note reviewed.   Constitutional:       General: He is not in acute distress.     Appearance: He is well-developed. He is not ill-appearing, toxic-appearing or diaphoretic.   HENT:      Head: Normocephalic and atraumatic.      Mouth/Throat:      Mouth: Mucous membranes are moist.   Eyes:      Extraocular Movements: Extraocular movements intact.   Cardiovascular:      Rate and Rhythm: Normal rate and regular rhythm.   Pulmonary:      Effort: Pulmonary effort is normal.   Abdominal:      General: There is no distension.      Tenderness: There is no abdominal tenderness.      Comments: Colostomy present, they are within bag, no stool.   Genitourinary:     Comments: Will catheter present  Musculoskeletal:         General: Normal range of  motion.      Cervical back: Neck supple.   Skin:     General: Skin is warm and dry.   Neurological:      Mental Status: He is alert and oriented to person, place, and time.           PROCEDURES     Procedures     DATA     Results     Procedure Component Value Units Date/Time    Basic metabolic panel [[id number]]  (Abnormal) Collected: 10/27/22 2032    Specimen: Blood, Venous Updated: 10/27/22 2108     Sodium 141 mEQ/L      Potassium 4.6 mEQ/L      Comment: Results obtained on plasma. Plasma Potassium values may be up to 0.4 mEQ/L less than serum values. The differences may be greater for patients with high platelet or white cell counts.        Chloride 103 mEQ/L      CO2 27 mEQ/L      BUN 27 mg/dL      Creatinine 0.9 mg/dL      Glucose 156 mg/dL      Calcium 8.5 mg/dL      eGFR >60.0 mL/min/1.73m*2      Anion Gap 11 mEQ/L     Lactate, w/ reflex repeat if > 2.0 [[id number]]  (Abnormal) Collected: 10/27/22 2032    Specimen: Blood, Venous Updated: 10/27/22 2043     Lactate 2.3 mmol/L     CBC and differential [[id number]]  (Abnormal) Collected: 10/27/22 2032    Specimen: Blood, Venous Updated: 10/27/22 2040     WBC 14.32 K/uL      RBC 3.48 M/uL      Hemoglobin 12.9 g/dL      Hematocrit 38.2 %      .8 fL      MCH 37.1 pg      MCHC 33.8 g/dL      RDW 16.6 %      Platelets 217 K/uL      MPV 9.5 fL      Differential Type Auto     nRBC 0.0 %      Immature Granulocytes 0.5 %      Neutrophils 83.4 %      Lymphocytes 7.2 %      Monocytes 7.9 %      Eosinophils 0.7 %      Basophils 0.3 %      Immature Granulocytes, Absolute 0.07 K/uL      Neutrophils, Absolute 11.95 K/uL      Lymphocytes, Absolute 1.03 K/uL      Monocytes, Absolute 1.13 K/uL      Eosinophils, Absolute 0.10 K/uL      Basophils, Absolute 0.04 K/uL           Imaging Results          CT ABDOMEN PELVIS WITH IV CONTRAST (Preliminary result)  Result time 10/27/22 23:59:00    ED Interpretation    [facility]  Teleradiology Cases  This communication is  confidential. The information contained herein is protected from unauthorized use by privilege or law. Copying, distribution, disclosure, or other use of this information is prohibited. You are required to destroy this communication if you have received it in error.      Patient Name: Samy Elena  Exam(s): a/p standard CT  MR#: [id number]    History: eval colostomy    Preliminary Impression:    Postsurgical changes of sigmoidectomy with left lower quadrant end colostomy. Diffuse colonic diverticulosis. Wall thickening and pericolonic stranding of the Gaytan's pouch concerning for acute diverticulitis. Associated presacral stranding. No evidence of perforation or abscess.    Moderate size bilateral pleural effusions with bibasilar atelectasis. Markedly enlarged right heart. Hepatic steatosis. Cholecystectomy clips. Bilateral renal cysts. Nonobstructive right renal stones. Decompressed bladder with Will in place.      Int  erpreted by: Jase Smith MD, Oct 27, 2022 11:12 PM    Samy Elena [id number], Oct 27, 2022                                No orders to display       Scoring tools                                  ED Course & MDM   MDM / ED COURSE / CLINICAL IMPRESSION / DISPO     MDM  Given concern for possible obstruction, colostomy patency will obtainSurgical consult, labs, CT imaging.  ED Course as of 10/28/22 0126   u Oct 27, 2022   2021 Second page sent to surgery [TL]   2026 Spoke with surgery.  They will be down to evaluate patient in the ED.  Agree with current work-up [TL]   2122 Surgery at bedside evaluating patient [TL]   8864 Page sent to surgery    [TL]   Fri Oct 28, 2022   0033 Spoke with surgery stating that if need be could start abx but abdo non tender. Req cbc repeat to assess leukocytosis   [TL]   0115 WBC(!): 14.86 [TL]   0126 Surgery is reviewed repeat CBC.  Agrees with initiating antibiotics.  Will give first dose of Augmentin in the ED prior to discharge [TL]      ED Course User  Index  [TL] Cameron Padilla, DO     Clinical Impression      None               Cameron Padilla,   10/28/22 0128

## 2022-10-28 ENCOUNTER — TELEPHONE (OUTPATIENT)
Dept: SURGERY | Facility: CLINIC | Age: 87
End: 2022-10-28
Payer: MEDICARE

## 2022-10-28 VITALS
OXYGEN SATURATION: 98 % | TEMPERATURE: 98.6 F | SYSTOLIC BLOOD PRESSURE: 109 MMHG | HEART RATE: 74 BPM | DIASTOLIC BLOOD PRESSURE: 62 MMHG | RESPIRATION RATE: 18 BRPM

## 2022-10-28 LAB
ERYTHROCYTE [DISTWIDTH] IN BLOOD BY AUTOMATED COUNT: 16.5 % (ref 11.6–14.4)
HCT VFR BLDCO AUTO: 30.5 % (ref 40.1–51)
HGB BLD-MCNC: 10.1 G/DL (ref 13.7–17.5)
LACTATE SERPL-SCNC: 1.2 MMOL/L (ref 0.4–2)
MCH RBC QN AUTO: 35.6 PG (ref 28–33.2)
MCHC RBC AUTO-ENTMCNC: 33.1 G/DL (ref 32.2–36.5)
MCV RBC AUTO: 107.4 FL (ref 83–98)
PDW BLD AUTO: 9.2 FL (ref 9.4–12.4)
PLATELET # BLD AUTO: 183 K/UL (ref 150–350)
RBC # BLD AUTO: 2.84 M/UL (ref 4.5–5.8)
WBC # BLD AUTO: 14.86 K/UL (ref 3.8–10.5)

## 2022-10-28 PROCEDURE — 85027 COMPLETE CBC AUTOMATED: CPT | Performed by: SURGERY

## 2022-10-28 PROCEDURE — 36415 COLL VENOUS BLD VENIPUNCTURE: CPT | Performed by: SURGERY

## 2022-10-28 PROCEDURE — 63700000 HC SELF-ADMINISTRABLE DRUG: Performed by: EMERGENCY MEDICINE

## 2022-10-28 RX ORDER — AMOXICILLIN AND CLAVULANATE POTASSIUM 875; 125 MG/1; MG/1
1 TABLET, FILM COATED ORAL
Qty: 14 TABLET | Refills: 0 | Status: SHIPPED | OUTPATIENT
Start: 2022-10-28 | End: 2022-11-04

## 2022-10-28 RX ORDER — AMOXICILLIN AND CLAVULANATE POTASSIUM 875; 125 MG/1; MG/1
1 TABLET, FILM COATED ORAL ONCE
Status: COMPLETED | OUTPATIENT
Start: 2022-10-28 | End: 2022-10-28

## 2022-10-28 RX ADMIN — AMOXICILLIN AND CLAVULANATE POTASSIUM 1 TABLET: 875; 125 TABLET, FILM COATED ORAL at 02:11

## 2022-10-28 NOTE — CONSULTS
"General Surgery Consult    Subjective     Samy Elena is a 89 y.o. male who presents because his nursing facility told him his \"ostomy was protruding.\" Patient was seen in consultation at the request of referring physician for management recommendations.     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 Bryant. He was started on vancomycin, zosyn, and clindamycin. He went back to the OR the following day, 8/3/22, for laparoscopic sigmoid colostomy, EUA, anal wound washout, no debridement, rectal washout with Dr. Bryant. He was eventually discharged to a SNF (hospitalization course prolonged given he was waiting placement).  Today he presents to the ED with his son because his nursing facility told him he should get checked out.  The nursing facility had two concerns: 1) his ostomy was \"protruding\" which appear new to them and 2) he had mucous/thin liquid discharge from rectum.  He states he has no concerns and that nothing is bothering him.  Denies fevers, chills, changes in appetite, nausea, vomiting, chest pain, shortness of breath, abdominal pain, changes to urination, changes to ostomy function.  He states he has been working with PT/OT.  He states he is taking tramadol 50 to 4 hours as needed due to chronic back pain.    Upon arrival he is afebrile. Hemodynamically stable although HR 90-110s. On RA. Labs significant for lactate 2.3, WBC 14.3, and hgb 12.9.  He received 2 L NS bolus.  Repeat lactate 1.2.  Repeat CBC with unchanged WBC. CTAP w/po and IV contrast with prelim read of postsurgical changes of sigmoidostomy, diffuse colonic diverticulosis, wall thickening and pericolonic stranding of the Gaytan's pouch concerning for acute diverticulitis.  Surgery consulted for " further recommendations.  Of note team spoke with remote radiology to discuss the prelim read as patient does not have a Gaytan's.    Medical History:   Past Medical History:   Diagnosis Date    Atrial fibrillation (CMS/HCC)     Disease of thyroid gland     GI (gastrointestinal bleed)     Hypertension     Myocardial infarction (CMS/HCC)        Surgical History:   Past Surgical History:   Procedure Laterality Date    BYPASS GRAFT         Social History:   Social History     Social History Narrative    Not on file   - At a rehab facility  - Denies alcohol, tobacco use, or drug use    Family History: History reviewed. No pertinent family history.    Allergies: Jardiance [empagliflozin]    Home Medications:  Not in a hospital admission.    Current Medications:    acetaminophen    aspirin    atorvastatin    clopidogreL    collagenase    insulin regular U-100    insulin regular U-100    lansoprazole    levothyroxine    metoprolol tartrate    sacubitriL-valsartan    sodium hypochlorite    torsemide    Review of Systems  Pertinent items are noted in HPI.    Objective     Physicial Exam  Visit Vitals  /70 (BP Location: Right upper arm, Patient Position: Lying)   Pulse 93   Temp 37 °C (98.6 °F) (Oral)   Resp 17   SpO2 99%       General appearance: alert, appears stated age and cooperative, resting comfortably in bed, not in acute distress  Head: normocephalic, without obvious abnormality, atraumatic  Eyes: conjunctivae/corneas clear. EOM's intact.  Ears: normal TM's and external ear canals both ears  Nose: Nares normal. Septum midline. Mucosa normal. No drainage or sinus tenderness.  Throat: lips, mucosa, and tongue normal; teeth and gums normal  Neck:  supple, symmetrical, trachea midline  Lungs: Even and unlabored on room air  Heart: Regular rate and rhythm  Abdomen: Soft, nondistended, nontender to palpation.  Colostomy pink and patent with flatus.  There is not much stool in the bag however  "patient and patient's son states this was recently changed at the facility.  Rectal: Previous surgical wounds well-healed, no signs of infection  Extremities: extremities normal, warm and well-perfused; no cyanosis, clubbing, or edema  Pulses: 2+ and symmetric  Skin: Skin color, texture, turgor normal. No rashes or lesions  Neurologic: Grossly normal    Labs  CBC Results       10/28/22 10/27/22 10/13/22     0043 2032 0703    WBC 14.86 14.32 9.65    RBC 2.84 3.48 2.89    HGB 10.1 12.9 10.4    HCT 30.5 38.2 32.1    .4 109.8 111.1    MCH 35.6 37.1 36.0    MCHC 33.1 33.8 32.4     217 103         Comment for HGB at 0043 on 10/28/22: ALL RESULTS HAVE BEEN CHECKED        BMP Results       10/27/22 10/25/22 10/17/22     2032 0640 0534     -- --    K 4.6 4.7 3.2    Cl 103 -- --    CO2 27 -- --    Glucose 156 -- --    BUN 27 -- --    Creatinine 0.9 -- --    Calcium 8.5 -- --    Anion Gap 11 -- --    EGFR >60.0 -- --         Comment for K at 2032 on 10/27/22: Results obtained on plasma. Plasma Potassium values may be up to 0.4 mEQ/L less than serum values. The differences may be greater for patients with high platelet or white cell counts.          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 Bryant   8/3/22, for laparoscopic sigmoid colostomy, EUA, anal wound washout, no debridement, rectal washout with Dr. Bryant    Who presents to the ED with his son because his nursing facility had two concerns: 1) his ostomy was \"protruding\" which appear new to them and 2) he had mucous/thin liquid discharge from rectum.  He denies any sxs. He is afebrile. Hemodynamically stable. Labs significant for lactate 2.3, WBC 14.3, and hgb 12.9. "  He received 2 L NS bolus.  Repeat lactate 1.2.  CTAP w/po and IV contrast with prelim read of postsurgical changes of sigmoidostomy, diffuse colonic diverticulosis, wall thickening and pericolonic stranding of the Gaytan's pouch concerning for acute diverticulitis.  Surgery consulted for further recommendations.       Plan     - discussed with attending on call who is in agreement  - ostomy is slightly prolapsed which is what the nursing facility nurses were likely talking about. Ostomy is pink and patent w/function. Abd exam is benign. Discussed with pt and pt's son that pt should follow up with Dr. Bryant in clinic for postop follow up. Additionally discussed that mucous discharge from rectum is normal after having a laparoscopic sigmoid colostomy. Pt has no further complaints. Given some inflammation around the colon with CT prelim read of possible acute diverticulitis and mild leukocytosis, recommend dc with augmentin for 7 days. At this time pt is asx and has no complaints so there is no indication for admission. From a surgical standpoint, pt is medically stable for dc.    Please page [phone] with any questions or concerns.      Maddie Salas MD

## 2022-10-28 NOTE — TELEPHONE ENCOUNTER
I spoke with Claudine Bey nurse who reports no bowel movement from colostomy but is having bowel movement rectally. Patient was to ER 10/27 same issue. Dg diverticulitis and started on Augmentin then discharged back to [facility]. I spoke with Dr. Bryant who stated continue to monitor and if constipated  To take Miralax as needed. I informed Miada to recall service at anytime. Maida with stated understanding of all noted above.

## 2022-11-07 PROCEDURE — 84134 ASSAY OF PREALBUMIN: CPT | Performed by: INTERNAL MEDICINE

## 2022-11-13 PROCEDURE — 81003 URINALYSIS AUTO W/O SCOPE: CPT | Performed by: INTERNAL MEDICINE

## 2022-11-13 PROCEDURE — 87077 CULTURE AEROBIC IDENTIFY: CPT | Performed by: INTERNAL MEDICINE

## 2022-11-13 PROCEDURE — 85027 COMPLETE CBC AUTOMATED: CPT | Performed by: INTERNAL MEDICINE

## 2022-11-13 PROCEDURE — 80053 COMPREHEN METABOLIC PANEL: CPT | Performed by: INTERNAL MEDICINE

## 2022-11-13 PROCEDURE — 87186 SC STD MICRODIL/AGAR DIL: CPT | Mod: 59 | Performed by: INTERNAL MEDICINE

## 2022-11-13 PROCEDURE — 81001 URINALYSIS AUTO W/SCOPE: CPT | Performed by: INTERNAL MEDICINE

## 2022-11-14 PROCEDURE — 83540 ASSAY OF IRON: CPT | Performed by: INTERNAL MEDICINE

## 2022-11-14 PROCEDURE — 82746 ASSAY OF FOLIC ACID SERUM: CPT | Performed by: INTERNAL MEDICINE

## 2022-11-14 PROCEDURE — 82607 VITAMIN B-12: CPT | Performed by: INTERNAL MEDICINE

## 2022-11-15 ENCOUNTER — OFFICE VISIT (OUTPATIENT)
Dept: SURGERY | Facility: CLINIC | Age: 87
End: 2022-11-15
Payer: MEDICARE

## 2022-11-15 VITALS
SYSTOLIC BLOOD PRESSURE: 105 MMHG | HEIGHT: 73 IN | WEIGHT: 165 LBS | HEART RATE: 86 BPM | DIASTOLIC BLOOD PRESSURE: 68 MMHG | BODY MASS INDEX: 21.87 KG/M2

## 2022-11-15 DIAGNOSIS — N48.22 CELLULITIS AND NECROSIS OF PENIS: ICD-10-CM

## 2022-11-15 DIAGNOSIS — K43.5 PARASTOMAL HERNIA WITHOUT OBSTRUCTION OR GANGRENE: ICD-10-CM

## 2022-11-15 DIAGNOSIS — N49.3 FOURNIER'S GANGRENE IN MALE: Primary | ICD-10-CM

## 2022-11-15 DIAGNOSIS — N48.29 CELLULITIS AND NECROSIS OF PENIS: ICD-10-CM

## 2022-11-15 PROCEDURE — 99214 OFFICE O/P EST MOD 30 MIN: CPT | Performed by: COLON & RECTAL SURGERY

## 2022-11-15 NOTE — LETTER
"  Dear Dr. Hernandez,    I saw Samy Elena in the office today in follow-up. Please see my note below.    If you have any additional questions, please do not hesitate to call me.    Sincerely,    Mat Bryant MD      ____________________________________    Colorectal Surgery Follow-up    Subjective      Samy Elena is a 89 y.o. male who was admitted to the hospital on 8/2/2022 for necrotizing fasciitis of the buttock.  He saw me in the office from the [facility] for what was thought to be a pilonidal cyst.  He was recovering from a recent hospitalization for change in mental status, hypotension, and hypoperfusion.  The patient has multiple medical issues including congestive heart failure with low ejection fraction, history of MI, chronic atrial fibrillation.    The patient was found to have severe necrotizing soft tissue infection of the left buttock into the ischial anal space and tracking to the perineal body and was taken the operating room on 8/2 for debridement of this area there was tracking into the perineum and to the base of the scrotum but no involvement of the urethra.  The patient was taken back to the operating room on 8/3 for further debridement and had a fairly healthy wound at that time without any additional necrotizing fasciitis.  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    On 8/23 he was transferred to [facility] for continued physical therapy and speech therapy.  He was receiving supplemental tube feeds with a Dobbhoff tube.    He returned to the emergency department on 10/27 because of concern for \"ostomy protruding\" as well as drainage from the rectum.    He continues to be at the rehabilitation unit at the nursing home.  He still cannot stand on his own and continues to work with speech therapy for his swallowing.  According to his son, he recently had a change in his mental status that was likely due to a UTI which is currently being " "treated.    Medical History:   Past Medical History:   Diagnosis Date    Atrial fibrillation (CMS/HCC)     Disease of thyroid gland     GI (gastrointestinal bleed)     Hypertension     Myocardial infarction (CMS/HCC)        Surgical History:   Past Surgical History:   Procedure Laterality Date    BYPASS GRAFT         Allergies: Jardiance [empagliflozin]    Current Medications:    acetaminophen    aspirin    atorvastatin    clopidogreL    collagenase    insulin regular U-100    insulin regular U-100    lansoprazole    levothyroxine    metoprolol tartrate    sacubitriL-valsartan    sodium hypochlorite    torsemide    Objective      Physicial Exam  Visit Vitals  /68   Pulse 86   Ht 1.854 m (6' 1\")   Wt 74.8 kg (165 lb)   BMI 21.77 kg/m²       Physical Exam  Vitals reviewed.   Constitutional:       Appearance: Normal appearance. He is well-developed.   HENT:      Head: Normocephalic and atraumatic.      Right Ear: Decreased hearing noted.      Left Ear: Decreased hearing noted.   Eyes:      Pupils: Pupils are equal, round, and reactive to light.   Cardiovascular:      Rate and Rhythm: Normal rate and regular rhythm.      Heart sounds: Normal heart sounds. No murmur heard.  Pulmonary:      Effort: Pulmonary effort is normal. No respiratory distress.      Breath sounds: Normal breath sounds. No wheezing.   Abdominal:      General: There is no distension.      Palpations: Abdomen is soft. There is no mass.      Tenderness: There is no abdominal tenderness.      Hernia: No hernia is present.      Comments: The laparoscopic sites have healed beautifully.  The colostomy is healthy in the left upper quadrant.  It protrudes nicely.  There is some minor bulging primarily superior to the stoma of a small peristomal hernia when he strains.   Genitourinary:     Comments: There is scarring at the base of the penis from his previous superficial skin necrosis.  I do not see any active open wound.  There is " some edema of the penile skin but the scrotum looks okay.  The penis seems to be functional.  The wound just to the left of the anus is very healthy.  There is a lot of healed and skin but the center portion of the wound is still open with some minimal granulation tissue.  It measures 2 cm in depth by 1.5 cm in length by 1 cm in width.  This exam the anus is normal.  He has good tone.  There is no inspissated mucus in the rectal vault.  There is a small amount of mucus on my glove.  Musculoskeletal:         General: No tenderness. Normal range of motion.      Cervical back: Normal range of motion and neck supple.      Comments: Wheelchair-bound.  Only able to stand briefly to pivot to the exam table.   Lymphadenopathy:      Cervical: No cervical adenopathy.   Skin:     General: Skin is warm and dry.      Capillary Refill: Capillary refill takes less than 2 seconds.      Findings: No rash.   Neurological:      Mental Status: He is alert and oriented to person, place, and time.      Cranial Nerves: No cranial nerve deficit.   Psychiatric:         Mood and Affect: Mood normal.         Behavior: Behavior normal.             Assessment      Problem List Items Addressed This Visit        Genitourinary    Cellulitis and necrosis of penis    Current Assessment & Plan     He had some skin necrosis of the base of the penis when he left the hospital.  This seems to have epithelialized nicely the skin.  The penis seems functional.  I do not think he needs any particular follow-up with urology who saw him in the hospital.            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  As he is diverted, he is healing in well.  As expect he will continue to get contraction of the wound with epithelialization over time.  I would like to reevaluate him in 3  months.            Other    Parastomal hernia    Current Assessment & Plan     His colostomy is functioning nicely and is healthy appearing.  He has a small peristomal hernia which is of no clinical concern.  As long as an appliance is being kept in place, he is not have any pain in the area and has normal stoma function, we will leave this alone.  Although it is technically possible to do a Isael closure, I do not recommend this, particularly with his current level of function.                 Mat Bryant MD

## 2022-11-15 NOTE — ASSESSMENT & PLAN NOTE
He had some skin necrosis of the base of the penis when he left the hospital.  This seems to have epithelialized nicely the skin.  The penis seems functional.  I do not think he needs any particular follow-up with urology who saw him in the hospital.

## 2022-11-15 NOTE — ASSESSMENT & PLAN NOTE
His colostomy is functioning nicely and is healthy appearing.  He has a small peristomal hernia which is of no clinical concern.  As long as an appliance is being kept in place, he is not have any pain in the area and has normal stoma function, we will leave this alone.  Although it is technically possible to do a Isael closure, I do not recommend this, particularly with his current level of function.

## 2022-11-15 NOTE — PROGRESS NOTES
"Colorectal Surgery Follow-up    Subjective     Samy Elena is a 89 y.o. male who was admitted to the hospital on 8/2/2022 for necrotizing fasciitis of the buttock.  He saw me in the office from the [facility] for what was thought to be a pilonidal cyst.  He was recovering from a recent hospitalization for change in mental status, hypotension, and hypoperfusion.  The patient has multiple medical issues including congestive heart failure with low ejection fraction, history of MI, chronic atrial fibrillation.    The patient was found to have severe necrotizing soft tissue infection of the left buttock into the ischial anal space and tracking to the perineal body and was taken the operating room on 8/2 for debridement of this area there was tracking into the perineum and to the base of the scrotum but no involvement of the urethra.  The patient was taken back to the operating room on 8/3 for further debridement and had a fairly healthy wound at that time without any additional necrotizing fasciitis.  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    On 8/23 he was transferred to [facility] for continued physical therapy and speech therapy.  He was receiving supplemental tube feeds with a Dobbhoff tube.    He returned to the emergency department on 10/27 because of concern for \"ostomy protruding\" as well as drainage from the rectum.    He continues to be at the rehabilitation unit at the nursing home.  He still cannot stand on his own and continues to work with speech therapy for his swallowing.  According to his son, he recently had a change in his mental status that was likely due to a UTI which is currently being treated.    Medical History:   Past Medical History:   Diagnosis Date    Atrial fibrillation (CMS/HCC)     Disease of thyroid gland     GI (gastrointestinal bleed)     Hypertension     Myocardial infarction (CMS/HCC)        Surgical History:   Past Surgical " "History:   Procedure Laterality Date    BYPASS GRAFT         Allergies: Jardiance [empagliflozin]    Current Medications:    acetaminophen    aspirin    atorvastatin    clopidogreL    collagenase    insulin regular U-100    insulin regular U-100    lansoprazole    levothyroxine    metoprolol tartrate    sacubitriL-valsartan    sodium hypochlorite    torsemide    Objective     Physicial Exam  Visit Vitals  /68   Pulse 86   Ht 1.854 m (6' 1\")   Wt 74.8 kg (165 lb)   BMI 21.77 kg/m²       Physical Exam  Vitals reviewed.   Constitutional:       Appearance: Normal appearance. He is well-developed.   HENT:      Head: Normocephalic and atraumatic.      Right Ear: Decreased hearing noted.      Left Ear: Decreased hearing noted.   Eyes:      Pupils: Pupils are equal, round, and reactive to light.   Cardiovascular:      Rate and Rhythm: Normal rate and regular rhythm.      Heart sounds: Normal heart sounds. No murmur heard.  Pulmonary:      Effort: Pulmonary effort is normal. No respiratory distress.      Breath sounds: Normal breath sounds. No wheezing.   Abdominal:      General: There is no distension.      Palpations: Abdomen is soft. There is no mass.      Tenderness: There is no abdominal tenderness.      Hernia: No hernia is present.      Comments: The laparoscopic sites have healed beautifully.  The colostomy is healthy in the left upper quadrant.  It protrudes nicely.  There is some minor bulging primarily superior to the stoma of a small peristomal hernia when he strains.   Genitourinary:     Comments: There is scarring at the base of the penis from his previous superficial skin necrosis.  I do not see any active open wound.  There is some edema of the penile skin but the scrotum looks okay.  The penis seems to be functional.  The wound just to the left of the anus is very healthy.  There is a lot of healed and skin but the center portion of the wound is still open with some minimal granulation " tissue.  It measures 2 cm in depth by 1.5 cm in length by 1 cm in width.  This exam the anus is normal.  He has good tone.  There is no inspissated mucus in the rectal vault.  There is a small amount of mucus on my glove.  Musculoskeletal:         General: No tenderness. Normal range of motion.      Cervical back: Normal range of motion and neck supple.      Comments: Wheelchair-bound.  Only able to stand briefly to pivot to the exam table.   Lymphadenopathy:      Cervical: No cervical adenopathy.   Skin:     General: Skin is warm and dry.      Capillary Refill: Capillary refill takes less than 2 seconds.      Findings: No rash.   Neurological:      Mental Status: He is alert and oriented to person, place, and time.      Cranial Nerves: No cranial nerve deficit.   Psychiatric:         Mood and Affect: Mood normal.         Behavior: Behavior normal.             Assessment     Problem List Items Addressed This Visit        Genitourinary    Cellulitis and necrosis of penis    Current Assessment & Plan     He had some skin necrosis of the base of the penis when he left the hospital.  This seems to have epithelialized nicely the skin.  The penis seems functional.  I do not think he needs any particular follow-up with urology who saw him in the hospital.            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  As he is diverted, he is healing in well.  As expect he will continue to get contraction of the wound with epithelialization over time.  I would like to reevaluate him in 3 months.            Other    Parastomal hernia    Current Assessment & Plan     His colostomy is functioning nicely and is healthy appearing.  He has a small peristomal hernia which is of no clinical concern.  As long as an appliance is being kept in place, he is not  have any pain in the area and has normal stoma function, we will leave this alone.  Although it is technically possible to do a Isael closure, I do not recommend this, particularly with his current level of function.                  Mat Bryant MD

## 2022-11-15 NOTE — ASSESSMENT & PLAN NOTE
He has significant improvement over his buttock and perineal wound with just a small residual defect that is getting local wound care.  As he is diverted, he is healing in well.  As expect he will continue to get contraction of the wound with epithelialization over time.  I would like to reevaluate him in 3 months.

## 2022-12-19 ENCOUNTER — OFFICE VISIT (OUTPATIENT)
Dept: NEUROLOGY | Facility: CLINIC | Age: 87
End: 2022-12-19
Payer: MEDICARE

## 2022-12-19 VITALS
WEIGHT: 180 LBS | HEIGHT: 73 IN | RESPIRATION RATE: 12 BRPM | OXYGEN SATURATION: 98 % | SYSTOLIC BLOOD PRESSURE: 120 MMHG | BODY MASS INDEX: 23.86 KG/M2 | DIASTOLIC BLOOD PRESSURE: 60 MMHG | HEART RATE: 70 BPM

## 2022-12-19 DIAGNOSIS — R41.0 CONFUSION: Primary | ICD-10-CM

## 2022-12-19 DIAGNOSIS — R44.3 HALLUCINATIONS: ICD-10-CM

## 2022-12-19 PROCEDURE — 99215 OFFICE O/P EST HI 40 MIN: CPT | Performed by: PSYCHIATRY & NEUROLOGY

## 2022-12-19 RX ORDER — ESCITALOPRAM OXALATE 5 MG/1
5 TABLET ORAL EVERY MORNING
COMMUNITY
End: 2023-12-07 | Stop reason: HOSPADM
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
## 2022-12-21 ENCOUNTER — TRANSCRIBE ORDERS (OUTPATIENT)
Dept: SCHEDULING | Age: 87
End: 2022-12-21

## 2022-12-21 DIAGNOSIS — R13.10 DYSPHAGIA, UNSPECIFIED: Primary | ICD-10-CM

## 2023-01-05 ENCOUNTER — HOSPITAL ENCOUNTER (OUTPATIENT)
Dept: RADIOLOGY | Facility: HOSPITAL | Age: 88
Discharge: HOME | End: 2023-01-05
Attending: INTERNAL MEDICINE
Payer: MEDICARE

## 2023-01-05 DIAGNOSIS — R13.10 DYSPHAGIA, UNSPECIFIED: ICD-10-CM

## 2023-01-05 PROCEDURE — 74230 X-RAY XM SWLNG FUNCJ C+: CPT

## 2023-01-05 PROCEDURE — 92611 MOTION FLUOROSCOPY/SWALLOW: CPT | Mod: GN

## 2023-01-05 NOTE — PROCEDURES
Video swallow study was completed. Please refer to the imaging section for full report and recommendations.        Results for orders placed during the hospital encounter of 01/05/23    FLUOROSCOPY VIDEO SWALLOW WITH SPEECH (Preliminary)  This result has not been signed. Information might be incomplete.    Impression  1. Functional to mild oral stage dysphagia  2. Mild pharyngeal stage dysphagia characterized by swallow delay, reduced  tongue-base retraction, sluggish and incomplete epiglottic inversion. Chronic  penetration observed with thin liquids, not improved with attempted  compensation. Aspiration observed with thin liquids via straw presentation with  use of chin tuck-there was a sensory response. No aspiration observed with  single cup sip of thin liquids, mildly thick liquids and solids.  3.  The esophagus was not assessed during today's study.      Dr. Dominik Hernandes  was present for this examination and agrees only with the  radiographic findings.  Specific swallowing recommendations are solely the  purview of the Speech Pathology Division.  This study was conducted and written by Candace Feldman MS, CCC-SLP/L.

## 2023-01-09 PROCEDURE — 85027 COMPLETE CBC AUTOMATED: CPT | Performed by: INTERNAL MEDICINE

## 2023-01-09 PROCEDURE — 80048 BASIC METABOLIC PNL TOTAL CA: CPT | Performed by: INTERNAL MEDICINE

## 2023-01-18 PROCEDURE — 84439 ASSAY OF FREE THYROXINE: CPT | Performed by: INTERNAL MEDICINE

## 2023-01-18 PROCEDURE — 84443 ASSAY THYROID STIM HORMONE: CPT | Performed by: INTERNAL MEDICINE

## 2023-01-18 PROCEDURE — 80048 BASIC METABOLIC PNL TOTAL CA: CPT | Performed by: INTERNAL MEDICINE

## 2023-01-18 PROCEDURE — 84481 FREE ASSAY (FT-3): CPT | Performed by: INTERNAL MEDICINE

## 2023-02-03 PROCEDURE — 80048 BASIC METABOLIC PNL TOTAL CA: CPT | Performed by: INTERNAL MEDICINE

## 2023-02-09 PROCEDURE — 82310 ASSAY OF CALCIUM: CPT | Performed by: INTERNAL MEDICINE

## 2023-02-09 PROCEDURE — 84481 FREE ASSAY (FT-3): CPT | Performed by: INTERNAL MEDICINE

## 2023-02-09 PROCEDURE — 84443 ASSAY THYROID STIM HORMONE: CPT | Performed by: INTERNAL MEDICINE

## 2023-02-13 PROCEDURE — 80048 BASIC METABOLIC PNL TOTAL CA: CPT | Performed by: INTERNAL MEDICINE

## 2023-02-13 PROCEDURE — 85027 COMPLETE CBC AUTOMATED: CPT | Performed by: INTERNAL MEDICINE

## 2023-02-16 ENCOUNTER — OFFICE VISIT (OUTPATIENT)
Dept: SURGERY | Facility: CLINIC | Age: 88
End: 2023-02-16
Payer: MEDICARE

## 2023-02-16 VITALS — BODY MASS INDEX: 20.28 KG/M2 | HEIGHT: 73 IN | WEIGHT: 153 LBS

## 2023-02-16 DIAGNOSIS — N49.3 FOURNIER'S GANGRENE IN MALE: Primary | ICD-10-CM

## 2023-02-16 PROCEDURE — 85027 COMPLETE CBC AUTOMATED: CPT | Performed by: INTERNAL MEDICINE

## 2023-02-16 PROCEDURE — 17250 CHEM CAUT OF GRANLTJ TISSUE: CPT | Performed by: COLON & RECTAL SURGERY

## 2023-02-16 PROCEDURE — 99213 OFFICE O/P EST LOW 20 MIN: CPT | Mod: 25 | Performed by: COLON & RECTAL SURGERY

## 2023-02-16 PROCEDURE — 80048 BASIC METABOLIC PNL TOTAL CA: CPT | Performed by: INTERNAL MEDICINE

## 2023-02-16 NOTE — PROGRESS NOTES
"Colorectal Surgery Follow-up    Subjective     Samy Elena is a 89 y.o. male who returns in follow-up for his colostomy and perianal wound.    He was admitted to the hospital on 8/2/2022 for necrotizing fasciitis of the buttock.  He saw me in the office from the [facility] care [place] for what was thought to be a pilonidal cyst.  He was recovering from a recent hospitalization for change in mental status, hypotension, and hypoperfusion.  The patient has multiple medical issues including congestive heart failure with low ejection fraction, history of MI, chronic atrial fibrillation.     The patient was found to have severe necrotizing soft tissue infection of the left buttock into the ischial anal space and tracking to the perineal body and was taken the operating room on 8/2 for debridement of this area there was tracking into the perineum and to the base of the scrotum but no involvement of the urethra.  The patient was taken back to the operating room on 8/3 for further debridement and had a fairly healthy wound at that time without any additional necrotizing fasciitis.  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    He is currently in a nursing home at [facility]" where he gets his care.  He is very limited with his activity.  He needs significant help with transfers.  He is basically wheelchair-bound.  He is on chronic home oxygen.  He is not having any buttock pain.  He has not having any problems with his colostomy.  He is not having discomfort and the stoma seems to be functioning normally.    Medical History:   Past Medical History:   Diagnosis Date   • Atrial fibrillation (CMS/HCC)    • Disease of thyroid gland    • GI (gastrointestinal bleed)    • Hypertension    • Myocardial infarction (CMS/HCC)        Surgical History:   Past Surgical History:   Procedure Laterality Date   • BYPASS GRAFT         Allergies: Jardiance [empagliflozin]    Current Medications:  •  " "acetaminophen  •  atorvastatin  •  clopidogreL  •  collagenase  •  escitalopram  •  insulin regular U-100  •  insulin regular U-100  •  lansoprazole  •  levothyroxine  •  metoprolol tartrate  •  sacubitriL-valsartan  •  sodium hypochlorite  •  torsemide    Objective     Physicial Exam  Visit Vitals  Ht 1.854 m (6' 1\")   Wt 69.4 kg (153 lb)   BMI 20.19 kg/m²       Physical Exam  Constitutional:       Comments: Very frail and debilitated.  In a wheelchair and requires significant help with transfer to the exam table.   HENT:      Head: Normocephalic and atraumatic.      Mouth/Throat:      Mouth: Mucous membranes are moist.   Eyes:      Extraocular Movements: Extraocular movements intact.      Pupils: Pupils are equal, round, and reactive to light.   Cardiovascular:      Rate and Rhythm: Normal rate and regular rhythm.   Pulmonary:      Breath sounds: Normal breath sounds.   Abdominal:      General: There is no distension.      Palpations: Abdomen is soft. There is no mass.      Tenderness: There is no abdominal tenderness.      Comments: The colostomy was placed in the left abdomen and looks healthy.  There is no sign of prolapse.  He has a minor peristomal hernia which is asymptomatic.   Genitourinary:     Comments: The scar extending onto the perineum has healed except for small amount of granulation tissue in the left anterior anal canal which was treated with silver nitrate and he tolerated this well.  Musculoskeletal:      Cervical back: Normal range of motion and neck supple.      Comments: Very limited gait.   Skin:     General: Skin is warm and dry.   Neurological:      General: No focal deficit present.      Mental Status: He is oriented to person, place, and time.   Psychiatric:         Mood and Affect: Mood normal.             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  There was just some very minor granulation tissue which I treated today.  He is asymptomatic.  His penile wounds have also healed in the penis is functioning.  We agreed that we will not do any consideration of colostomy closure.  He was asking about being able to go home but I think he needs too much care and needs to continue to stay in a nursing home environment.  His son agrees.  I would like to see him yearly to check the colostomy.                  Mat Bryant MD

## 2023-02-16 NOTE — LETTER
"  Dear Dr. Hernandez,    I saw Samy Elena in the office today in follow-up. Please see my note below.    If you have any additional questions, please do not hesitate to call me.    Sincerely,    Mat Bryant MD      ____________________________________    Colorectal Surgery Follow-up    Subjective      Samy Elena is a 89 y.o. male who returns in follow-up for his colostomy and perianal wound.    He was admitted to the hospital on 8/2/2022 for necrotizing fasciitis of the buttock.  He saw me in the office from the [facility] care [place] for what was thought to be a pilonidal cyst.  He was recovering from a recent hospitalization for change in mental status, hypotension, and hypoperfusion.  The patient has multiple medical issues including congestive heart failure with low ejection fraction, history of MI, chronic atrial fibrillation.     The patient was found to have severe necrotizing soft tissue infection of the left buttock into the ischial anal space and tracking to the perineal body and was taken the operating room on 8/2 for debridement of this area there was tracking into the perineum and to the base of the scrotum but no involvement of the urethra.  The patient was taken back to the operating room on 8/3 for further debridement and had a fairly healthy wound at that time without any additional necrotizing fasciitis.  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    He is currently in a nursing home at \"[facility]\" where he gets his care.  He is very limited with his activity.  He needs significant help with transfers.  He is basically wheelchair-bound.  He is on chronic home oxygen.  He is not having any buttock pain.  He has not having any problems with his colostomy.  He is not having discomfort and the stoma seems to be functioning normally.    Medical History:   Past Medical History:   Diagnosis Date   • Atrial fibrillation (CMS/HCC)    • Disease of thyroid gland " "   • GI (gastrointestinal bleed)    • Hypertension    • Myocardial infarction (CMS/HCC)        Surgical History:   Past Surgical History:   Procedure Laterality Date   • BYPASS GRAFT         Allergies: Jardiance [empagliflozin]    Current Medications:  •  acetaminophen  •  atorvastatin  •  clopidogreL  •  collagenase  •  escitalopram  •  insulin regular U-100  •  insulin regular U-100  •  lansoprazole  •  levothyroxine  •  metoprolol tartrate  •  sacubitriL-valsartan  •  sodium hypochlorite  •  torsemide    Objective      Physicial Exam  Visit Vitals  Ht 1.854 m (6' 1\")   Wt 69.4 kg (153 lb)   BMI 20.19 kg/m²       Physical Exam  Constitutional:       Comments: Very frail and debilitated.  In a wheelchair and requires significant help with transfer to the exam table.   HENT:      Head: Normocephalic and atraumatic.      Mouth/Throat:      Mouth: Mucous membranes are moist.   Eyes:      Extraocular Movements: Extraocular movements intact.      Pupils: Pupils are equal, round, and reactive to light.   Cardiovascular:      Rate and Rhythm: Normal rate and regular rhythm.   Pulmonary:      Breath sounds: Normal breath sounds.   Abdominal:      General: There is no distension.      Palpations: Abdomen is soft. There is no mass.      Tenderness: There is no abdominal tenderness.      Comments: The colostomy was placed in the left abdomen and looks healthy.  There is no sign of prolapse.  He has a minor peristomal hernia which is asymptomatic.   Genitourinary:     Comments: The scar extending onto the perineum has healed except for small amount of granulation tissue in the left anterior anal canal which was treated with silver nitrate and he tolerated this well.  Musculoskeletal:      Cervical back: Normal range of motion and neck supple.      Comments: Very limited gait.   Skin:     General: Skin is warm and dry.   Neurological:      General: No focal deficit present.      Mental Status: He is oriented to person, place, " and time.   Psychiatric:         Mood and Affect: Mood normal.             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  There was just some very minor granulation tissue which I treated today.  He is asymptomatic.  His penile wounds have also healed in the penis is functioning.  We agreed that we will not do any consideration of colostomy closure.  He was asking about being able to go home but I think he needs too much care and needs to continue to stay in a nursing home environment.  His son agrees.  I would like to see him yearly to check the colostomy.                 Mat Bryant MD

## 2023-02-16 NOTE — ASSESSMENT & PLAN NOTE
The wound has basically healed incompletely.  There was just some very minor granulation tissue which I treated today.  He is asymptomatic.  His penile wounds have also healed in the penis is functioning.  We agreed that we will not do any consideration of colostomy closure.  He was asking about being able to go home but I think he needs too much care and needs to continue to stay in a nursing home environment.  His son agrees.  I would like to see him yearly to check the colostomy.

## 2023-02-20 PROCEDURE — 80048 BASIC METABOLIC PNL TOTAL CA: CPT | Performed by: INTERNAL MEDICINE

## 2023-02-27 PROCEDURE — 80053 COMPREHEN METABOLIC PANEL: CPT | Performed by: INTERNAL MEDICINE

## 2023-03-06 PROCEDURE — 80048 BASIC METABOLIC PNL TOTAL CA: CPT | Performed by: INTERNAL MEDICINE

## 2023-03-10 ENCOUNTER — LAB REQUISITION (OUTPATIENT)
Dept: LAB | Facility: HOSPITAL | Age: 88
End: 2023-03-10
Attending: INTERNAL MEDICINE
Payer: MEDICARE

## 2023-03-10 DIAGNOSIS — R06.2 WHEEZING: ICD-10-CM

## 2023-03-10 PROCEDURE — 83880 ASSAY OF NATRIURETIC PEPTIDE: CPT | Performed by: INTERNAL MEDICINE

## 2023-03-10 PROCEDURE — 30099996 STAT DAYTIME VENIPUNCTURE: Performed by: INTERNAL MEDICINE

## 2023-03-16 PROCEDURE — 85027 COMPLETE CBC AUTOMATED: CPT | Performed by: INTERNAL MEDICINE

## 2023-03-29 PROCEDURE — 87086 URINE CULTURE/COLONY COUNT: CPT | Performed by: INTERNAL MEDICINE

## 2023-03-29 PROCEDURE — 81001 URINALYSIS AUTO W/SCOPE: CPT | Performed by: INTERNAL MEDICINE

## 2023-04-20 PROCEDURE — 85027 COMPLETE CBC AUTOMATED: CPT | Performed by: INTERNAL MEDICINE

## 2023-04-20 PROCEDURE — 80048 BASIC METABOLIC PNL TOTAL CA: CPT | Performed by: INTERNAL MEDICINE

## 2023-08-03 ENCOUNTER — APPOINTMENT (EMERGENCY)
Dept: RADIOLOGY | Facility: HOSPITAL | Age: 88
DRG: 291 | End: 2023-08-03
Attending: EMERGENCY MEDICINE
Payer: MEDICARE

## 2023-08-03 ENCOUNTER — HOSPITAL ENCOUNTER (INPATIENT)
Facility: HOSPITAL | Age: 88
LOS: 6 days | Discharge: HOME HEALTH | DRG: 291 | End: 2023-08-09
Attending: EMERGENCY MEDICINE | Admitting: STUDENT IN AN ORGANIZED HEALTH CARE EDUCATION/TRAINING PROGRAM
Payer: MEDICARE

## 2023-08-03 ENCOUNTER — APPOINTMENT (INPATIENT)
Dept: RADIOLOGY | Facility: HOSPITAL | Age: 88
DRG: 291 | End: 2023-08-03
Payer: MEDICARE

## 2023-08-03 DIAGNOSIS — N30.00 ACUTE CYSTITIS WITHOUT HEMATURIA: Primary | ICD-10-CM

## 2023-08-03 DIAGNOSIS — I72.3 ILIAC ARTERY ANEURYSM (CMS/HCC): ICD-10-CM

## 2023-08-03 DIAGNOSIS — I50.23 ACUTE ON CHRONIC SYSTOLIC (CONGESTIVE) HEART FAILURE: ICD-10-CM

## 2023-08-03 DIAGNOSIS — J90 PLEURAL EFFUSION: ICD-10-CM

## 2023-08-03 DIAGNOSIS — I50.9 ACUTE DECOMPENSATED HEART FAILURE (CMS/HCC): ICD-10-CM

## 2023-08-03 DIAGNOSIS — R09.02 HYPOXIA: ICD-10-CM

## 2023-08-03 PROBLEM — N17.9 AKI (ACUTE KIDNEY INJURY) (CMS/HCC): Status: ACTIVE | Noted: 2023-08-03

## 2023-08-03 PROBLEM — J96.91 RESPIRATORY FAILURE WITH HYPOXIA (CMS/HCC): Status: ACTIVE | Noted: 2023-08-03
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
## 2023-08-03 PROBLEM — N39.0 UTI (URINARY TRACT INFECTION): Status: ACTIVE | Noted: 2023-08-03

## 2023-08-03 PROBLEM — I10 HYPERTENSION: Status: ACTIVE | Noted: 2023-08-03

## 2023-08-03 PROBLEM — I48.91 ATRIAL FIBRILLATION (CMS/HCC): Status: ACTIVE | Noted: 2023-08-03

## 2023-08-03 PROBLEM — I25.10 CORONARY ARTERY DISEASE: Status: ACTIVE | Noted: 2023-08-03

## 2023-08-03 PROBLEM — R79.89 ELEVATED TROPONIN: Status: ACTIVE | Noted: 2023-08-03

## 2023-08-03 LAB
ALBUMIN SERPL-MCNC: 3.5 G/DL (ref 3.5–5.7)
ALP SERPL-CCNC: 103 IU/L (ref 34–125)
ALT SERPL-CCNC: 11 IU/L (ref 7–52)
ANION GAP SERPL CALC-SCNC: 10 MEQ/L (ref 3–15)
ANION GAP SERPL CALC-SCNC: 9 MEQ/L (ref 3–15)
AST SERPL-CCNC: 36 IU/L (ref 13–39)
ATRIAL RATE: 70
BASOPHILS # BLD: 0.03 K/UL (ref 0.01–0.1)
BASOPHILS NFR BLD: 0.6 %
BILIRUB SERPL-MCNC: 0.8 MG/DL (ref 0.3–1.2)
BNP SERPL-MCNC: 396 PG/ML
BUN SERPL-MCNC: 39 MG/DL (ref 7–25)
BUN SERPL-MCNC: 40 MG/DL (ref 7–25)
CALCIUM SERPL-MCNC: 8.5 MG/DL (ref 8.6–10.3)
CALCIUM SERPL-MCNC: 8.9 MG/DL (ref 8.6–10.3)
CHLORIDE SERPL-SCNC: 105 MEQ/L (ref 98–107)
CHLORIDE SERPL-SCNC: 107 MEQ/L (ref 98–107)
CO2 SERPL-SCNC: 25 MEQ/L (ref 21–31)
CO2 SERPL-SCNC: 26 MEQ/L (ref 21–31)
CREAT SERPL-MCNC: 1.8 MG/DL (ref 0.7–1.3)
CREAT SERPL-MCNC: 1.8 MG/DL (ref 0.7–1.3)
DIFFERENTIAL METHOD BLD: ABNORMAL
EOSINOPHIL # BLD: 0.14 K/UL (ref 0.04–0.54)
EOSINOPHIL NFR BLD: 2.7 %
ERYTHROCYTE [DISTWIDTH] IN BLOOD BY AUTOMATED COUNT: 16.4 % (ref 11.6–14.4)
EST. AVERAGE GLUCOSE BLD GHB EST-MCNC: 137 MG/DL
GFR SERPL CREATININE-BSD FRML MDRD: 35.5 ML/MIN/1.73M*2
GFR SERPL CREATININE-BSD FRML MDRD: 35.5 ML/MIN/1.73M*2
GLUCOSE BLD-MCNC: 131 MG/DL (ref 70–99)
GLUCOSE SERPL-MCNC: 115 MG/DL (ref 70–99)
GLUCOSE SERPL-MCNC: 163 MG/DL (ref 70–99)
HBA1C MFR BLD: 6.4 %
HCT VFR BLDCO AUTO: 36.3 % (ref 40.1–51)
HGB BLD-MCNC: 11.5 G/DL (ref 13.7–17.5)
IMM GRANULOCYTES # BLD AUTO: 0.06 K/UL (ref 0–0.08)
IMM GRANULOCYTES NFR BLD AUTO: 1.2 %
INR PPP: 1.4
LACTATE SERPL-SCNC: 1.9 MMOL/L (ref 0.4–2)
LYMPHOCYTES # BLD: 0.94 K/UL (ref 1.2–3.5)
LYMPHOCYTES NFR BLD: 18 %
MAGNESIUM SERPL-MCNC: 2 MG/DL (ref 1.8–2.5)
MAGNESIUM SERPL-MCNC: 2.1 MG/DL (ref 1.8–2.5)
MCH RBC QN AUTO: 31.9 PG (ref 28–33.2)
MCHC RBC AUTO-ENTMCNC: 31.7 G/DL (ref 32.2–36.5)
MCV RBC AUTO: 100.8 FL (ref 83–98)
MONOCYTES # BLD: 0.47 K/UL (ref 0.3–1)
MONOCYTES NFR BLD: 9 %
NEUTROPHILS # BLD: 3.57 K/UL (ref 1.7–7)
NEUTS SEG NFR BLD: 68.5 %
NRBC BLD-RTO: 0 %
PDW BLD AUTO: 10.4 FL (ref 9.4–12.4)
PLATELET # BLD AUTO: 145 K/UL (ref 150–350)
POCT TEST: ABNORMAL
POTASSIUM SERPL-SCNC: 4.2 MEQ/L (ref 3.5–5.1)
POTASSIUM SERPL-SCNC: 4.5 MEQ/L (ref 3.5–5.1)
PROT SERPL-MCNC: 8.3 G/DL (ref 6–8.2)
PROTHROMBIN TIME: 16.6 SEC (ref 12.2–14.5)
QRS DURATION: 164
QT INTERVAL: 460
QTC CALCULATION(BAZETT): 510
R AXIS: 70
RBC # BLD AUTO: 3.6 M/UL (ref 4.5–5.8)
SODIUM SERPL-SCNC: 140 MEQ/L (ref 136–145)
SODIUM SERPL-SCNC: 142 MEQ/L (ref 136–145)
T WAVE AXIS: -10
T4 FREE SERPL-MCNC: 1.45 NG/DL (ref 0.58–1.64)
TROPONIN I SERPL HS-MCNC: 18.9 PG/ML
TROPONIN I SERPL HS-MCNC: 20.4 PG/ML
TSH SERPL DL<=0.05 MIU/L-ACNC: 0.09 MIU/L (ref 0.34–5.6)
VENTRICULAR RATE: 74
WBC # BLD AUTO: 5.21 K/UL (ref 3.8–10.5)

## 2023-08-03 PROCEDURE — 73030 X-RAY EXAM OF SHOULDER: CPT | Mod: RT

## 2023-08-03 PROCEDURE — 72125 CT NECK SPINE W/O DYE: CPT | Mod: MG

## 2023-08-03 PROCEDURE — G1004 CDSM NDSC: HCPCS

## 2023-08-03 PROCEDURE — 84484 ASSAY OF TROPONIN QUANT: CPT | Performed by: EMERGENCY MEDICINE

## 2023-08-03 PROCEDURE — 74177 CT ABD & PELVIS W/CONTRAST: CPT | Mod: MG

## 2023-08-03 PROCEDURE — 99285 EMERGENCY DEPT VISIT HI MDM: CPT | Mod: 25

## 2023-08-03 PROCEDURE — 85025 COMPLETE CBC W/AUTO DIFF WBC: CPT | Performed by: EMERGENCY MEDICINE

## 2023-08-03 PROCEDURE — 80053 COMPREHEN METABOLIC PANEL: CPT | Performed by: EMERGENCY MEDICINE

## 2023-08-03 PROCEDURE — 99222 1ST HOSP IP/OBS MODERATE 55: CPT | Performed by: SURGERY

## 2023-08-03 PROCEDURE — 73502 X-RAY EXAM HIP UNI 2-3 VIEWS: CPT | Mod: RT

## 2023-08-03 PROCEDURE — 63600105 HC IODINE BASED CONTRAST: Mod: JZ | Performed by: EMERGENCY MEDICINE

## 2023-08-03 PROCEDURE — 99222 1ST HOSP IP/OBS MODERATE 55: CPT | Performed by: INTERNAL MEDICINE

## 2023-08-03 PROCEDURE — 73080 X-RAY EXAM OF ELBOW: CPT | Mod: RT

## 2023-08-03 PROCEDURE — 83880 ASSAY OF NATRIURETIC PEPTIDE: CPT

## 2023-08-03 PROCEDURE — 63700000 HC SELF-ADMINISTRABLE DRUG

## 2023-08-03 PROCEDURE — 83735 ASSAY OF MAGNESIUM: CPT

## 2023-08-03 PROCEDURE — 25800000 HC PHARMACY IV SOLUTIONS: Performed by: EMERGENCY MEDICINE

## 2023-08-03 PROCEDURE — 70450 CT HEAD/BRAIN W/O DYE: CPT | Mod: MG

## 2023-08-03 PROCEDURE — 84443 ASSAY THYROID STIM HORMONE: CPT

## 2023-08-03 PROCEDURE — 83605 ASSAY OF LACTIC ACID: CPT | Performed by: EMERGENCY MEDICINE

## 2023-08-03 PROCEDURE — 80048 BASIC METABOLIC PNL TOTAL CA: CPT

## 2023-08-03 PROCEDURE — 36415 COLL VENOUS BLD VENIPUNCTURE: CPT | Performed by: EMERGENCY MEDICINE

## 2023-08-03 PROCEDURE — 63600000 HC DRUGS/DETAIL CODE: Mod: JZ | Performed by: EMERGENCY MEDICINE

## 2023-08-03 PROCEDURE — 71260 CT THORAX DX C+: CPT | Mod: MG

## 2023-08-03 PROCEDURE — 93005 ELECTROCARDIOGRAM TRACING: CPT | Performed by: EMERGENCY MEDICINE

## 2023-08-03 PROCEDURE — 83036 HEMOGLOBIN GLYCOSYLATED A1C: CPT

## 2023-08-03 PROCEDURE — 85610 PROTHROMBIN TIME: CPT

## 2023-08-03 PROCEDURE — 84439 ASSAY OF FREE THYROXINE: CPT

## 2023-08-03 PROCEDURE — 73060 X-RAY EXAM OF HUMERUS: CPT | Mod: RT

## 2023-08-03 PROCEDURE — 99223 1ST HOSP IP/OBS HIGH 75: CPT | Performed by: HOSPITALIST

## 2023-08-03 PROCEDURE — 93010 ELECTROCARDIOGRAM REPORT: CPT | Performed by: INTERNAL MEDICINE

## 2023-08-03 PROCEDURE — 21400000 HC ROOM AND CARE CCU/INTERMEDIATE

## 2023-08-03 RX ORDER — LANOLIN ALCOHOL/MO/W.PET/CERES
200 CREAM (GRAM) TOPICAL 4 TIMES DAILY
COMMUNITY
End: 2024-08-01 | Stop reason: HOSPADM

## 2023-08-03 RX ORDER — POTASSIUM CHLORIDE 750 MG/1
20 TABLET, EXTENDED RELEASE ORAL 2 TIMES DAILY
COMMUNITY
End: 2024-08-01 | Stop reason: HOSPADM

## 2023-08-03 RX ORDER — CLOPIDOGREL BISULFATE 75 MG/1
75 TABLET ORAL DAILY
Status: DISCONTINUED | OUTPATIENT
Start: 2023-08-04 | End: 2023-08-09 | Stop reason: HOSPADM

## 2023-08-03 RX ORDER — CIPROFLOXACIN 250 MG/1
250 TABLET, FILM COATED ORAL 2 TIMES DAILY
COMMUNITY
Start: 2023-07-29 | End: 2023-08-09 | Stop reason: HOSPADM

## 2023-08-03 RX ORDER — TALC
3 POWDER (GRAM) TOPICAL NIGHTLY
COMMUNITY
End: 2024-10-15

## 2023-08-03 RX ORDER — TORSEMIDE 20 MG/1
20 TABLET ORAL 2 TIMES DAILY
Status: DISCONTINUED | OUTPATIENT
Start: 2023-08-03 | End: 2023-08-09

## 2023-08-03 RX ORDER — THIAMINE HCL 50 MG
100 TABLET ORAL DAILY
COMMUNITY
End: 2024-11-27 | Stop reason: SDUPTHER

## 2023-08-03 RX ORDER — ESCITALOPRAM OXALATE 5 MG/1
5 TABLET ORAL DAILY
COMMUNITY
End: 2023-11-02 | Stop reason: SDUPTHER

## 2023-08-03 RX ORDER — LEVOTHYROXINE SODIUM 137 UG/1
137 TABLET ORAL EVERY MORNING
COMMUNITY
End: 2023-12-07 | Stop reason: HOSPADM

## 2023-08-03 RX ORDER — ACETAMINOPHEN 325 MG/1
650 TABLET ORAL EVERY 6 HOURS PRN
Status: DISCONTINUED | OUTPATIENT
Start: 2023-08-03 | End: 2023-08-09 | Stop reason: HOSPADM

## 2023-08-03 RX ORDER — METOPROLOL SUCCINATE 25 MG/1
25 TABLET, EXTENDED RELEASE ORAL DAILY
COMMUNITY
End: 2023-08-09 | Stop reason: HOSPADM

## 2023-08-03 RX ORDER — SACUBITRIL AND VALSARTAN 24; 26 MG/1; MG/1
2 TABLET, FILM COATED ORAL 2 TIMES DAILY
Status: DISCONTINUED | OUTPATIENT
Start: 2023-08-03 | End: 2023-08-09 | Stop reason: HOSPADM

## 2023-08-03 RX ORDER — CLOPIDOGREL BISULFATE 75 MG/1
75 TABLET ORAL EVERY MORNING
COMMUNITY
End: 2024-10-24 | Stop reason: HOSPADM

## 2023-08-03 RX ORDER — METOPROLOL SUCCINATE 25 MG/1
25 TABLET, EXTENDED RELEASE ORAL DAILY
Status: DISCONTINUED | OUTPATIENT
Start: 2023-08-03 | End: 2023-08-09 | Stop reason: HOSPADM

## 2023-08-03 RX ORDER — DEXTROSE 40 %
15-30 GEL (GRAM) ORAL AS NEEDED
Status: DISCONTINUED | OUTPATIENT
Start: 2023-08-03 | End: 2023-08-09 | Stop reason: HOSPADM

## 2023-08-03 RX ORDER — OMEPRAZOLE 20 MG/1
20 TABLET, DELAYED RELEASE ORAL
COMMUNITY
End: 2024-06-25

## 2023-08-03 RX ORDER — VIT C/E/ZN/COPPR/LUTEIN/ZEAXAN 250MG-90MG
500 CAPSULE ORAL EVERY MORNING
COMMUNITY

## 2023-08-03 RX ORDER — FUROSEMIDE 10 MG/ML
40 INJECTION INTRAMUSCULAR; INTRAVENOUS ONCE
Status: COMPLETED | OUTPATIENT
Start: 2023-08-03 | End: 2023-08-03

## 2023-08-03 RX ORDER — ASCORBIC ACID 500 MG
500 TABLET ORAL EVERY MORNING
COMMUNITY
End: 2024-03-26

## 2023-08-03 RX ORDER — ESCITALOPRAM OXALATE 5 MG/1
5 TABLET ORAL DAILY
Status: DISCONTINUED | OUTPATIENT
Start: 2023-08-04 | End: 2023-08-09 | Stop reason: HOSPADM

## 2023-08-03 RX ORDER — TORSEMIDE 20 MG/1
40 TABLET ORAL EVERY OTHER DAY
COMMUNITY
End: 2023-08-09 | Stop reason: HOSPADM

## 2023-08-03 RX ORDER — IBUPROFEN 200 MG
16-32 TABLET ORAL AS NEEDED
Status: DISCONTINUED | OUTPATIENT
Start: 2023-08-03 | End: 2023-08-09 | Stop reason: HOSPADM

## 2023-08-03 RX ORDER — CALCIUM CARBONATE 200(500)MG
1 TABLET,CHEWABLE ORAL 2 TIMES DAILY
COMMUNITY
End: 2024-10-15

## 2023-08-03 RX ORDER — SACUBITRIL AND VALSARTAN 24; 26 MG/1; MG/1
1 TABLET, FILM COATED ORAL 2 TIMES DAILY
COMMUNITY
End: 2023-08-09 | Stop reason: HOSPADM

## 2023-08-03 RX ORDER — DEXTROSE 50 % IN WATER (D50W) INTRAVENOUS SYRINGE
25 AS NEEDED
Status: DISCONTINUED | OUTPATIENT
Start: 2023-08-03 | End: 2023-08-09 | Stop reason: HOSPADM

## 2023-08-03 RX ORDER — CIPROFLOXACIN 250 MG/1
250 TABLET, FILM COATED ORAL 2 TIMES DAILY
Status: DISCONTINUED | OUTPATIENT
Start: 2023-08-03 | End: 2023-08-04

## 2023-08-03 RX ADMIN — FUROSEMIDE 40 MG: 10 INJECTION, SOLUTION INTRAMUSCULAR; INTRAVENOUS at 10:20

## 2023-08-03 RX ADMIN — ACETAMINOPHEN 650 MG: 325 TABLET, FILM COATED ORAL at 20:56

## 2023-08-03 RX ADMIN — SODIUM CHLORIDE 500 ML: 9 INJECTION, SOLUTION INTRAVENOUS at 09:05

## 2023-08-03 RX ADMIN — IOHEXOL 100 ML: 350 INJECTION, SOLUTION INTRAVENOUS at 08:54

## 2023-08-03 RX ADMIN — CIPROFLOXACIN HYDROCHLORIDE 250 MG: 250 TABLET, FILM COATED ORAL at 20:29

## 2023-08-03 ASSESSMENT — ENCOUNTER SYMPTOMS
PALPITATIONS: 0
SEIZURES: 0
LIGHT-HEADEDNESS: 1
BLOOD IN STOOL: 0
RHINORRHEA: 0
SORE THROAT: 0
DIARRHEA: 0
SHORTNESS OF BREATH: 1
VOMITING: 0
BACK PAIN: 0
NAUSEA: 0
COUGH: 1
FEVER: 0
ARTHRALGIAS: 1
ABDOMINAL PAIN: 0
NECK PAIN: 0
CHILLS: 0

## 2023-08-03 ASSESSMENT — COGNITIVE AND FUNCTIONAL STATUS - GENERAL
CLIMB 3 TO 5 STEPS WITH RAILING: 2 - A LOT
MOVING TO AND FROM BED TO CHAIR: 2 - A LOT
STANDING UP FROM CHAIR USING ARMS: 2 - A LOT
WALKING IN HOSPITAL ROOM: 2 - A LOT

## 2023-08-03 NOTE — NURSING NOTE
Pt arrived onto unit via stretcher. Pt assisted into bed via assist x2. Pt placed on tele monitor. Pt incontinent of urine, pads were changed. Pt has ostomy bag with stool in bag. Pt oriented x4. No edema noted. Pt c/o pain on right side of body (right shoulder and leg). Will keep monitoring patient.

## 2023-08-03 NOTE — ASSESSMENT & PLAN NOTE
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 Jeffrey at [facility] regimen: Metoprolol 25 mg daily, Entresto, Toremide 40 mg every other day  Dry weight 166 (reported). Weight at 162  Physical exam shows crackles, , CT chest shows large left and small right pleural effusions    CHF exacerbation likely in the s/o increased fluid intake vs dietary indiscretion vs component of progression of known RV dysfunction due to pulmonary HTN; otherwise low suspicion for ACS as trop plateau'd and ECG without acute ischemic change        Plan  - Start oral 10mg torsemide , re-dose based on Cr response  - Hold GDMT given soft BP and decompensated CHF, resume as able  - Cards c/s, preferably CHF/pHTN team  - CHF orderset: strict I&Os, daily standing wt, replete lytes for goal Mg>2 and K>4, cardiac diet with fluid restriction

## 2023-08-03 NOTE — ASSESSMENT & PLAN NOTE
Patient with recent UTI being treated with 7-day course of ciprofloxacin as an outpatient (initiated 7/29/2023)  Denies urinary complaints    - Complete Ceftriaxone course  - Switched from Cipro d/t prolonged QTc

## 2023-08-03 NOTE — CONSULTS
CARDIOLOGY INTIAL CONSULT    Subjective     Reason for consult  Concern for RV Failure    History of Present Illness  Samy Glass is a 89 y.o. year old male with a past medical history of ischemic cardiomyopathy EF most recently of 40% s/p CABG in 1996, biventricular heart failure, chronic atrial fibrillation, dyslipidemia, history of Justin's gangrene, hypertension, hx of GI bleed.    Patient states that he came in this morning as he had an episode where he was going up a ramp into his house and all of a sudden felt like his legs were weak and then stated he felt like his legs gave out on him.  He states that he is never experienced this before.  He denies any prodromal symptoms and denies any lightheadedness, dizziness, chest pain, shortness of breath.  He states that over the past month he feels like his breathing has been a little bit worse.  His cardiologist recently increased his torsemide dose and he states that he has been taking 2 tabs of 20 mg thus a total of 40 mg every other day.  He states that he took a dose yesterday.  He states that with this increase in torsemide dosing his breathing did feel improved.  In the emergency department patient was found to have a modest BNP elevation as well as a large left-sided pleural effusion.  Patient was provided with 40 of IV Lasix.  Patient states that his normal weight at home has been around 165 and at home most recently about a couple days ago his weight was 167 pounds.    Of note the patient has had a couple hospitalizations over the past couple of months where the patient has had approximately a liter drained from each lung and has had recurrent effusions.  There appears to be multiple different doses of torsemide that have been trialed over the past few months in conjunction with adjusting his blood pressure medication doses to avoid hypotension as well as to maintain his fluid balance.  He follows with a cardiologist at [facility].  He  states that about a year or so ago he was found to have an NSTEMI they took him to the cardiac catheterization lab but he states that no stents were placed and they opted for medical therapy.  Per documentation it would appear that the hospitalization of the patient reports he had a troponin level of 30,000 but had a documented NSTEMI.  Patient has remained on Plavix and not Eliquis in the setting of GI bleeding.  At this time patient states that breathing feels pretty close to being back to normal.  He denies any chest pain, lightheadedness, dizziness.  He states that normally his blood pressure runs in the low 100s but sometimes will dip to the 90s.      Review of Systems  A complete 14-point review of systems is negative, except as noted in the HPI.    Past History  No past medical history on file.  No past surgical history on file.  No family history on file.  Social History     Tobacco Use   • Smoking status: Not on file   • Smokeless tobacco: Not on file   Substance Use Topics   • Alcohol use: Not on file       Allergies  No Known Allergies    Home Medications  Current Outpatient Medications   Medication Instructions   • ascorbic acid (VITAMIN C) 500 mg, oral, Daily   • calcium carbonate (TUMS) 200 mg calcium (500 mg) chewable tablet 1 tablet, oral, 2 times daily   • ciprofloxacin (CIPRO) 250 mg, oral, 2 times daily   • clopidogreL (PLAVIX) 75 mg, oral, Daily   • cyanocobalamin (VITAMIN B12) 500 mcg, oral, Daily   • escitalopram (LEXAPRO) 5 mg, oral, Daily   • levothyroxine (SYNTHROID) 137 mcg, oral, Daily (6:30a)   • magnesium oxide (MAG-OX) 400 mg, oral, 2 times daily   • melatonin 3 mg, oral, Nightly   • metoprolol succinate XL (TOPROL-XL) 25 mg, oral, Daily   • multivitamin tablet 1 tablet, oral, Daily   • omeprazole OTC (PRILOSEC OTC) 20 mg, oral, Daily before breakfast   • potassium chloride (KLOR-CON) 10 mEq CR tablet 20 mEq, oral, 2 times daily   • sacubitriL-valsartan (ENTRESTO) 24-26 mg per tablet 1  tablet, oral, 2 times daily   • thiamine 100 mg, oral, Daily   • torsemide (DEMADEX) 40 mg, oral, Every other day        Hospital Medications  • ciprofloxacin  250 mg oral BID   • [START ON 8/4/2023] clopidogreL  75 mg oral Daily   • [START ON 8/4/2023] escitalopram  5 mg oral Daily   • levothyroxine  137 mcg oral Daily (6:30a)   • [Provider Managed Hold] metoprolol succinate XL  25 mg oral Daily   • [Provider Managed Hold] sacubitriL-valsartan  2 tablet oral BID   • [Provider Managed Hold] torsemide  20 mg oral BID       Objective     Physical Exam  Vitals:    08/03/23 1559   BP:    Pulse: 84   Resp:    Temp:    SpO2:       Ht Readings from Last 1 Encounters:   No data found for Ht      Wt Readings from Last 3 Encounters:   No data found for Wt    There is no height or weight on file to calculate BMI.    Intake/Output Summary (Last 24 hours) at 8/3/2023 1648  Last data filed at 8/3/2023 1444  Gross per 24 hour   Intake --   Output 400 ml   Net -400 ml       Physical Exam  Constitutional:       General: He is not in acute distress.     Comments: Appropriate and conversant   HENT:      Head:      Comments: Pt with bruising scattered along his forehead     Right Ear: External ear normal.      Left Ear: External ear normal.      Mouth/Throat:      Mouth: Mucous membranes are moist.   Eyes:      Pupils: Pupils are equal, round, and reactive to light.   Neck:      Comments: JVP fall seen below the level of the clavicle  Cardiovascular:      Rate and Rhythm: Normal rate. Rhythm irregular.      Pulses: Normal pulses.   Pulmonary:      Effort: Pulmonary effort is normal.      Breath sounds: No wheezing.      Comments: Decreased over the left chest wall  Abdominal:      General: Abdomen is flat. There is no distension.   Musculoskeletal:      Right lower leg: No edema.      Left lower leg: No edema.   Skin:     General: Skin is warm.      Capillary Refill: Capillary refill takes less than 2 seconds.      Coloration: Skin is  pale.   Neurological:      General: No focal deficit present.      Mental Status: He is alert and oriented to person, place, and time.   Psychiatric:         Mood and Affect: Mood normal.         Behavior: Behavior normal.         Relevant data reviewed:    ECG: Atrial fibrillation   Right bundle branch block   Abnormal ECG   No previous ECGs available   Confirmed by Nubia Rincon ([id number]) on 8/3/2023 5:08:51 PM    Telemetry: atrial fibrillation seen    Labs  Lab Results   Component Value Date    WBC 5.21 08/03/2023    HGB 11.5 (L) 08/03/2023     (L) 08/03/2023    ALT 11 08/03/2023    AST 36 08/03/2023     08/03/2023    K 4.2 08/03/2023    CREATININE 1.8 (H) 08/03/2023    TSH 0.09 (L) 08/03/2023    HGBA1C 6.4 (H) 08/03/2023         Vascular Studies:  No results found for this or any previous visit from the past 730 days.    No results found for this or any previous visit from the past 730 days.    No results found for this or any previous visit from the past 730 days.    No results found for this or any previous visit from the past 730 days.      Peripheral:  No results found for this or any previous visit from the past 730 days.      Assessment & Plan     1. Acute on Chronic Biventricular Heart Failure  Patient presenting with some clinical information that appears to be volume overloaded with evidence of IVC reflux seen on CT scan modestly elevated BNP as well as bilateral pleural effusions.  Although with regards to IVC reflux need to consider that patient has severe TR.  On examination at this time patient appears euvolemic with no lower extremity edema and follow-up JVP: Below the level of the clavicle.  Unclear with the tipping point for this patient was during this hospitalization or the etiology for frequent pleural effusions is at the time however the patient has had a significant number of medication changes with regards to his diuretic regiment over the past few months and wonder if he has been  on it adequate diuretic as he does note that his breathing has felt better over the last couple of days after increasing his torsemide dose to 40 mg every other day.  -Agree with holding GDMT at this time with Entresto and will add back metoprolol as blood pressure normalizes.  Suspect that there is some difficulty in balancing patient's volume management as well as his blood pressure given medication changes as an outpatient.  -Holding further diuresis today and will assess renal indices tomorrow and suspect that patient will likely tolerate an oral maintenance dose and will consider torsemide 20 mg daily.  -Continue to obtain standing daily weights.  Patient notes that his dry weight seems to be around 165 pounds.    2. CAD s/p CABG  Patient with history of CAD status post CABG in 1996.  Maintained on Plavix in order.  He is on statin as an outpatient and can continue while inpatient    3. Fall  It does not appear the patient's cardiac history has played a significant role in this patient's fall as he relays no prodromal symptoms and states that his legs just gave out.  In a 89-year-old gentleman our concern would be some level of orthostasis with his blood pressure running low as well as with difficulties in managing his fluid status.  -Would ensure that orthostatic vital signs are obtained  -Agree with holding blood pressure medications for now to volume status to equal great and will reassess starting diuretic tomorrow for maintenance.  -Would consider PMR consultation to evaluate other possible etiology of the patient's fall    4. Atrial Fibrillation  History of chronic atrial fibrillation maintained on beta-blocker.  Beta-blocker currently on hold in the setting of hypotension.  Patient not on anticoagulation at this time given GI bleeding.  We will continue to follow-up with regards to appropriateness of Watchman procedure as an outpatient.  Aorta.  This conversation is already taken  place.      Recommendations not finalized until staffed with attending physician.  Please await attending attestation for final recommendations.      Eric Gilbert, DO  PGY4 Cardiology  [id number]

## 2023-08-03 NOTE — ED PROVIDER NOTES
Emergency Medicine Note  HPI   HISTORY OF PRESENT ILLNESS     Dictation errors and typographical errors may be present in the document below.  Jl in 17  RN hx/EHR reviewed  Hx obtained from pt and his aide    Patient is unsteady on his feet at baseline.  He lives  24-hour aides at home and he presents after a fall.  Patient has reported variable history is as to the nature of the fall.  Aides report that he hallucinates at times and he has been hallucinating more frequently since he was diagnosed with urinary tract infection recently.  He is currently taking antibiotics.    There was a loud noise and the aide found the patient had fallen.  There was no witnessed loss of consciousness.  Vital signs normal per EMS.  Patient is on anticoagulation for atrial fibrillation.    Baseline systolic blood pressure per aide is in the 100s.                    Patient History   PAST HISTORY     Reviewed from Nursing Triage:       No past medical history on file.    No past surgical history on file.    No family history on file.           Review of Systems   REVIEW OF SYSTEMS     Review of Systems      VITALS     ED Vitals    Date/Time Temp Pulse Resp BP SpO2 [facility]   08/03/23 0821 36.6 °C (97.8 °F) -- -- -- -- EE   08/03/23 0816 -- 86 20 97/54 89 % EE        Pulse Ox %: 89 % (08/03/23 0825)  Pulse Ox Interpretation: Low (08/03/23 0825)  Heart Rate: 86 (08/03/23 0825)  Rhythm Strip Interpretation: Atrial Fibrillation (08/03/23 0825)     Physical Exam   PHYSICAL EXAM     Physical Exam  Vitals and nursing note reviewed.   Constitutional:       Appearance: He is well-developed.   HENT:      Head:      Comments: Hematoma right and left aspect of forehead.       Right Ear: External ear normal.      Left Ear: External ear normal.   Eyes:      General: No scleral icterus.     Conjunctiva/sclera: Conjunctivae normal.   Neck:      Trachea: No tracheal deviation.   Cardiovascular:      Rate and Rhythm: Normal rate and regular rhythm.       Heart sounds: Normal heart sounds. No murmur heard.  Pulmonary:      Effort: Pulmonary effort is normal.      Comments: Scant rhonchi left base  Abdominal:      General: There is no distension.      Palpations: Abdomen is soft. There is no mass.      Tenderness: There is no abdominal tenderness. There is no guarding or rebound.   Musculoskeletal:         General: Tenderness ( Mild tenderness palpation overlying right greater trochanter region.tender prox R humeurs;  Mild tenderness right upper back, in the region of the trapezius.  No midline spine tenderness ) present. Normal range of motion.      Cervical back: Neck supple.      Comments: No calf assymetry;  No LE cords/tenderness   Skin:     General: Skin is warm and dry.      Capillary Refill: Capillary refill takes more than 3 seconds. CR 3-4sec  Neurological:      Mental Status: He is alert.      Comments: Communicates clearly;  Moves all extremities           PROCEDURES     Critical Care    Performed by: Watson Quinonez MD  Authorized by: Watson Quinonez MD    Critical care provider statement:     Critical care time (minutes):  35    Critical care time was exclusive of:  Separately billable procedures and treating other patients    Critical care was necessary to treat or prevent imminent or life-threatening deterioration of the following conditions: hypoxia; hypotension;    Critical care was time spent personally by me on the following activities:  Ordering and performing treatments and interventions, ordering and review of laboratory studies, ordering and review of radiographic studies, pulse oximetry, re-evaluation of patient's condition, review of old charts, obtaining history from patient or surrogate, examination of patient, evaluation of patient's response to treatment and development of treatment plan with patient or surrogate         DATA     Results     None          Imaging Results    None         ECG 12 lead    (Results Pending)        Scoring tools                                  ED Course & MDM   MDM / ED COURSE / CLINICAL IMPRESSION / DISPO     Medical Decision Making  Likely mech. Fall, but pt w/ unreliable hx---need to consider non-mech fall as well.    Hypoxia noted--->imrpved w/ oxygen;    Imaging notable for pleural effusion--likely cause of hypoxia--suspec tdue to adhf (after initial NS bolusm in ED due to hypotension, diuresis initiated);    Incidental iliac aa. 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  External Data Reviewed: notes.     Details: 08/2022 TTE  Interpretation Summary       ? Normal-sized LV. Mildly decreased LV systolic function. Estimated EF 40- 45%. Markedly abnormal LV septal wall motion. Normal LV wall thickness. Unable to assess diastolic filling pattern of the LV.  ? Severely dilated RV. Severely reduced RV systolic function.  ? Mildly dilated LA.  ? Severely dilated RA. Catheter present in the RA.  ? Aorta not well visualized. Aortic root sclerotic.  ? Sclerotic aortic valve leaflets. Trace aortic valve regurgitation.  ? Sclerotic mitral valve. Trace mitral valve regurgitation.  ? Pulmonic valve not well visualized.  ? Dilated tricuspid annulus with leaflet failure to coapt. Large gap visualized, approximately 1 cm. Severe tricuspid valve regurgitation. Vena contracta 1.0cm. Unable to estimate RVSP in the setting of severe tricuspid regurgitation and single-chamber physiology. Reversed hepatic vein  systolic flow.  ? IVC is dilated (>2.1cm). IVC collapses <50% during inspiration.  ? No evidence of pericardial effusion.  ? No prior study available for comparison.         08/2022 cardiolog note  Samy Elena is a 89 y.o. 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  Labs: ordered.  Radiology: ordered and independent interpretation performed.     Details: ct chest- +pleural effusion noted  ECG/medicine tests: ordered.  Discussion of management or test interpretation with external provider(s): [facility]- care transferred;  Vascular surgery- c/s requested    Risk  Prescription drug management.  Decision regarding hospitalization.          ED Course as of 08/03/23 1242   Thu Aug 03, 2023   0825 EKG contemporaneously interpreted by me:  Af , rate 74,  non-specific st-tw changes, no stemi;   [JF]   0835 Patient made tier 3 trauma.  Hypoxia noted, treated with nasal cannula.  We will get a stat CT imaging to evaluate for traumatic injury including pneumothorax intracranial hemorrhage on the pathology [JF]   0930 Initially hypotensive, treated with  mL.    Blood pressure has improved.    On further evaluation after imaging and point-of-care ultrasound which revealed a massively dilated IVC (approximately 32 mm with minimal Siobhan collapse), I suspect that he is actually somewhat volume overloaded.  His hypoxia is likely from the large pleural effusion which is likely on the basis of volume overload.    We will give Lasix 40 mg IV. [JF]   1016 [facility] transfered  Will call Vascular c/s for iliac a.. aneurysm [JF]   1240 Pt refused R femur xr per radiology;     [JF]      ED Course User Index  [JF] Watson Quinonez MD     Clinical Impression      None               Watson Quinonez MD  08/04/23 3488

## 2023-08-03 NOTE — ASSESSMENT & PLAN NOTE
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

## 2023-08-03 NOTE — ASSESSMENT & PLAN NOTE
Large left and small right pleural effusion on CT chest  Suspect etiology most likely acute CHF due to right-sided heart failure however asymmetric nature raises concern for other etiology (ie, malignancy, paramedic)      - F/U with cytology for results  - breath sounds on left have improved.   - Fluid protein- 4.7 and Serum protein 6.9. Ratio= .68 . - Exudative effusion

## 2023-08-03 NOTE — ASSESSMENT & PLAN NOTE
Fall after standing from bed, likely mechanical in the setting of generalized weakness and hypoxia versus a component of orthostatic hypotension  No associated loss of consciousness or seizure activity  CT head on admission without acute intracranial abnormality    Plan:   - Fall precautions  - PT/OT will work with sit to stand movements  - orthostatic vitals

## 2023-08-03 NOTE — ASSESSMENT & PLAN NOTE
Hypoxia to 89% on arrival which improved on 2 L NC  In the setting of CT chest revealing large left and small right pleural effusions  No wheezing on exam, has had a productive cough likely cardiac in nature due to volume overload    - Plan as per acute CHF problem and pleural effusion problem  - Wean O2 as able to maintain O2 sat >92% (O2 sat is currently at 95 on 2L NC)

## 2023-08-03 NOTE — CONSULTS
Vascular Surgery Consult  Pager: [phone]    Subjective     Samy Butcher is a 89 y.o. male with PMH Afib, HTN, hx of MI, cardiomyopathy, heart failure, denise's gangrene and PSH CABG, emergent incision & drainage with debridement of perineum and perirectal area for NSTI and take back for laparoscopic sigmoid colostomy, EUA, anal wound & rectal washout (Noone) who presented to [facility] ED earlier this morning after a unwitnessed fall after his 24-hour aid found him on the floor. He was admitted to the medical service for treatment of Acute on chronic systolic (congestive) heart failure (CMS/[facility]) [I50.23], pleural effusion, and spinal fractrues. Patient was seen in consultation at the request of referring physician for management recommendations regarding right internal iliac artery aneurysm seen on CT.      Patient seen and examined at bedside. Very pleasant and accompanied by aid and two family members. History was obtained from patient and aid. His son Marko is his POA and was also called in during our conversation. Patient states that he fell on his right side after his knees gave out earlier today. Typically ambulates at home with a walker. Since then he has been experiencing right sided hip and thigh pain. Discussed the CT findings with the patient who reports right internal iliac artery aneurysm would be a new finding. States he follows with Dr. Quinones at [facility] who is his cardiologist who performed his CABG back in 1996. Able to move both feet and sensation is still intact. Denies any pain in his feet, nonhealing wounds, or decreased sensation.    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      Some hypotension upon arrival but slowly improving. On 2 L NC saturating in the high 90s. All other vital signs stable.    Medical History: No past medical history on file.    Surgical History: No past surgical history on file.    Social History:   Social History      Social History Narrative   • Not on file       Family History: No family history on file.    Allergies: Patient has no known allergies.    Home Medications:  Not in a hospital admission.    Current Medications:  •  ciprofloxacin  •  clopidogreL  •  escitalopram  •  levothyroxine  •  metoprolol succinate XL  •  potassium chloride  •  sacubitriL-valsartan  •  torsemide    Review of Systems  Pertinent items are noted in HPI.    Objective     Physicial Exam  Visit Vitals  BP (!) 104/51   Pulse 79   Temp 36.6 °C (97.8 °F) (Oral)   Resp 20   SpO2 97%       General appearance: alert, appears stated age and cooperative, pleasant, on 2L NC  Head: multiple areas of ecchymosis, dried blood on the right side of his forehead  Neck: trachea midline, symmetric   Lungs: bilateral chest rise and fall, no increased effort of breathing   Heart: regular rate and rhythm  Abdomen: soft, nontender, nondistended. Colostomy pink   Extremities: extremities normal, warm and well-perfused; no cyanosis, clubbing, or edema  Pulses: 2+ palpable DP and PT bilaterally. 2+ palpable femoral pulse on right side   Skin: Skin color, texture, turgor normal. No rashes or lesions  Neurologic: Grossly normal    Labs  CBC Results       08/03/23     0832    WBC 5.21    RBC 3.60    HGB 11.5    HCT 36.3    .8    MCH 31.9    MCHC 31.7            BMP Results       08/03/23     0832        K 4.2    Cl 107    CO2 26    Glucose 115    BUN 39    Creatinine 1.8    Calcium 8.9    Anion Gap 9    EGFR 35.5         Comment for K at 0832 on 08/03/23: Results obtained on plasma. Plasma Potassium values may be up to 0.4 mEQ/L less than serum values. The differences may be greater for patients with high platelet or white cell counts.          Imaging  CT CHEST WITH IV CONTRAST   Final Result   IMPRESSION:   1. Large right and small left pleural effusions with adjacent compressive   atelectasis and significant left lower lobe volume loss.      2.  Cardiomegaly with IVC distention and contrast reflux in keeping with heart   failure.      3. 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      4. Moderate T4 and mild L3 compression fractures, age indeterminate.  Clinically   correlate.      CT ABDOMEN PELVIS WITH IV CONTRAST   Final Result   IMPRESSION:   1. Large right and small left pleural effusions with adjacent compressive   atelectasis and significant left lower lobe volume loss.      2. Cardiomegaly with IVC distention and contrast reflux in keeping with heart   failure.      3. 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      4. Moderate T4 and mild L3 compression fractures, age indeterminate.  Clinically   correlate.      CT HEAD WITHOUT IV CONTRAST   Final Result   IMPRESSION:  No acute intracranial abnormality.  Multiple scalp hematomas.  No   evidence for acute fracture or traumatic subluxation the cervical spine.  Large   left pleural effusion.      COMMENT: A standard noncontrast head CT was performed. Noncontrast CT   examination of the cervical spine performed following the standard protocol.   Sagittal and coronal reformations rendered from axial source images. Images   reviewed in bone and soft tissue windows.         CT DOSE:  One or more dose reduction techniques (e.g. automated exposure   control, adjustment of the mA and/or kV according to patient size, use of   iterative reconstruction technique) utilized for this examination.      Comparison:  No prior studies are available for comparison.      Findings:  Sulci, ventricles and basal cisterns are prominent within normal   limits for patient's age.  Mild periventricular and subcortical white matter   hypoattenuation, nonspecific, likely sequela microangiopathic disease.  No acute   hemorrhage, acute territorial infarct, or mass effect is seen.  There is no   extra-axial fluid collection.  The visualized paranasal sinuses are clear.    Mastoid air cells are clear.  Moderate-sized left frontal scalp hematoma.  Small   right frontal scalp hematoma.         Cervicothoracic alignment: Anatomic.   Prevertebral soft tissues: Normal in thickness.   Vertebral bodies: Normal in height.  No acute fractures.  Fusion of the   posterior elements of C4 on C5.   Intervertebral discs: Disc osteophyte complex throughout the cervical spine.   Cervical and upper thoracic spinal canal: Patent.      Axial images:   Skull base: Normal.   C1-2: Normal.   C2-3: Facet arthrosis, right uncovertebral hypertrophic changes,   contributing to mild right foraminal narrowing.   C3-4: Disc osteophyte complex with severe right greater than left   uncovertebral hypertrophic changes, severe left facet hypertrophy, contributing   to moderate bilateral foraminal narrowing.   C4-5: Severe left and moderate right facet hypertrophy, contributing to   moderate to moderate severe left foraminal narrowing.   C5-6: Small disc osteophyte complex with facet arthrosis and left   uncovertebral hypertrophic changes, contributing to mild left foraminal   narrowing.   C6-7: Mild posterior osseous spurring without consequence.   C7-T1: Right facet arthrosis without consequence.      Other: Large left pleural effusion.  Right apical pleural-parenchymal scarring.            CT CERVICAL SPINE WITHOUT IV CONTRAST   Final Result   IMPRESSION:  No acute intracranial abnormality.  Multiple scalp hematomas.  No   evidence for acute fracture or traumatic subluxation the cervical spine.  Large   left pleural effusion.      COMMENT: A standard noncontrast head CT was performed. Noncontrast CT   examination of the cervical spine performed following the standard protocol.   Sagittal and coronal reformations rendered from axial source images. Images   reviewed in bone and soft tissue windows.         CT DOSE:  One or more dose reduction techniques (e.g. automated exposure   control, adjustment of the mA and/or  kV according to patient size, use of   iterative reconstruction technique) utilized for this examination.      Comparison:  No prior studies are available for comparison.      Findings:  Sulci, ventricles and basal cisterns are prominent within normal   limits for patient's age.  Mild periventricular and subcortical white matter   hypoattenuation, nonspecific, likely sequela microangiopathic disease.  No acute   hemorrhage, acute territorial infarct, or mass effect is seen.  There is no   extra-axial fluid collection.  The visualized paranasal sinuses are clear.   Mastoid air cells are clear.  Moderate-sized left frontal scalp hematoma.  Small   right frontal scalp hematoma.         Cervicothoracic alignment: Anatomic.   Prevertebral soft tissues: Normal in thickness.   Vertebral bodies: Normal in height.  No acute fractures.  Fusion of the   posterior elements of C4 on C5.   Intervertebral discs: Disc osteophyte complex throughout the cervical spine.   Cervical and upper thoracic spinal canal: Patent.      Axial images:   Skull base: Normal.   C1-2: Normal.   C2-3: Facet arthrosis, right uncovertebral hypertrophic changes,   contributing to mild right foraminal narrowing.   C3-4: Disc osteophyte complex with severe right greater than left   uncovertebral hypertrophic changes, severe left facet hypertrophy, contributing   to moderate bilateral foraminal narrowing.   C4-5: Severe left and moderate right facet hypertrophy, contributing to   moderate to moderate severe left foraminal narrowing.   C5-6: Small disc osteophyte complex with facet arthrosis and left   uncovertebral hypertrophic changes, contributing to mild left foraminal   narrowing.   C6-7: Mild posterior osseous spurring without consequence.   C7-T1: Right facet arthrosis without consequence.      Other: Large left pleural effusion.  Right apical pleural-parenchymal scarring.            ECG 12 lead             Assessment   Samy Butcher is a 89 y.o. male  with PMH Afib, HTN, hx of MI, cardiomyopathy, heart failure, denise's gangrene and PSH CABG, emergent incision & drainage with debridement of perineum and perirectal area for NSTI and take back for laparoscopic sigmoid colostomy, EUA, anal wound & rectal washout (Noone) who presented to [facility] ED earlier this morning after a unwitnessed fall after his 24-hour aid found him on the floor. He was admitted to the medical service for treatment of Acute on chronic systolic (congestive) heart failure (CMS/HCC) [I50.23], pleural effusion, and spinal fractrues. Patient was seen in consultation at the request of referring physician for management recommendations regarding right internal iliac artery aneurysm seen on CT.         Plan   -No acute surgical intervention indicated   -Palpable DP and PT pulses bilaterally   -Patient can either follow-up with his cardiogist or with vascular surgery in the office for monitoring of his right iliac artery aneurysm   -DC instructions included, vascular surgery will sign off   -Please see attending attestation for finalized recommendations    SOPHIA Lowery   Vascular Surgery  Please page [phone] with questions or concerns

## 2023-08-03 NOTE — DISCHARGE INSTRUCTIONS
"  Hospital Course:  You were admitted to [facility] on 8/3 after presenting after a fall. You were found to have a large amount of fluid in the sac surrounding your left lung (pleural effusion). We were able to successfully drain some of this fluid 8/7, however some of this fluid still remains. You were also seen by Orthopedic Surgery regarding the fracture seen at your right hip, as well as Physical Medicine and Rehabilitation. You were also seen by Cardiology due your chronic heart failure. We had to discontinue some of your medication because you were having low blood pressure for the majority of the duration of your stay. Due to your fracture, you will require the assistance of 2 people for any out of bed transfers.     Medications:  Please see the \"After Visit Summary\" for detailed medication information (dosing and administration)    START taking:  - torsemide 10 mg daily    STOP taking:  - Metoprolol succinate 25 mg  - Entresto   We stopped this medication because it can lead to lower blood pressures.     Follow ups:  Please call and schedule follow up with your primary care physician within 1-2 weeks of discharge. You will need to have blood work obtained at this visit (basic metabolic panel & magnesium in 1 week, thyroid function studies in 4-6 weeks). We will also need to make sure that you are tolerating your diuretic (torsemide).     Please call and schedule follow up with your Cardiologist (Dr. Roshan Jeffrey) in 1-2 weeks of discharge as we have discontinued some of heart failure medications since your blood pressure was low for much of your stay. We will also need to make sure that you are tolerating your diuretic (torsemide).     Please call and schedule follow up with Orthopedic Surgery in 4-6 weeks should you find your symptoms of your hip fracture worsening. The number is attached in the paperwork.     Please bring this discharge paperwork to all of your follow up " appointments.    If you have any questions regarding your home medications please contact your primary care physician immediately.    If you experience recurrence of your symptoms or have any worsening shortness of breath, chest pain, feeling like you are going to pass out or any other concerning symptoms please do not hesitate to call your primary care doctor or present to the emergency department for evaluation.     Weigh yourself every day. A sudden weight gain can mean your heart failure is getting worse. If your weight goes up by more than 2 pounds in 1 day, 5 pounds in 1 week please contact your PCP or cardiologist. This is a sign that you are retaining more fluid than you should be. Clues to weight gain include checking your ankles for swelling, or noticing you are short of breath when you lie down.      Please bring this discharge paperwork to all of your follow up appointments. If you experience recurrence of your symptoms, please do not hesitate to call your primary care doctor or present to the emergency department for evaluation. It was a pleasure taking care of you at [facility]! We wish you the best of health.       Thank you for choosing [facility]! We wish you the best of [facility].      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 There was no surgical intervention indicated due to good blood supply to your right foot (palpable pulses and no ischemic pain). This aneurysm can be monitored by either your outpatient cardiologist or a vascular surgeon. If you choose to follow-up with vascular surgery, the office information & list of providers has been included below     Follow Up Appointment:   Vascular surgery office number: [phone]    Dr. Joey Lo     [facility]  [place]  Medical Office Building, [address]  [address] [place] [address]  [phone]    CT of right hip findings: Nondisplaced fractures of the right inferior pubic ramus and right acetabulum with extension of the fracture plane superiorly to involve the right iliac bone.

## 2023-08-03 NOTE — ASSESSMENT & PLAN NOTE
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

## 2023-08-03 NOTE — ASSESSMENT & PLAN NOTE
Hx CABG in 1996, subsequent adenosine MPI negative for ischemia in 6/13  On Plavix  No chest pain, ECG without acute ischemic changes, trop plateau'd    - Continue Plavix

## 2023-08-03 NOTE — PROGRESS NOTES
Spoke with patient, patient's son and patient's granddaughter and confirmed with medication list from [facility] and/or patient's own pharmacy to complete the medication reconciliation.     Prior to admission medication list:    Current Outpatient Medications:   •  ascorbic acid (VITAMIN C) 500 mg tablet, Take 500 mg by mouth daily.  •  calcium carbonate (TUMS) 200 mg calcium (500 mg) chewable tablet, Take 1 tablet by mouth 2 (two) times a day.  •  ciprofloxacin (CIPRO) 250 mg tablet, Take 250 mg by mouth 2 (two) times a day.  •  clopidogreL (PLAVIX) 75 mg tablet, Take 75 mg by mouth daily.  •  cyanocobalamin (VITAMIN B12) 500 mcg tablet, Take 500 mcg by mouth daily.  •  escitalopram (LEXAPRO) 5 mg tablet, Take 5 mg by mouth daily.  •  levothyroxine (SYNTHROID) 137 mcg tablet, Take 137 mcg by mouth daily.  •  magnesium oxide (MAG-OX) 400 mg (241.3 mg magnesium) tablet, Take 400 mg by mouth 2 (two) times a day.  •  melatonin 3 mg tablet, Take 3 mg by mouth nightly.  •  metoprolol succinate XL (TOPROL-XL) 25 mg 24 hr tablet, Take 25 mg by mouth daily.  •  multivitamin tablet, Take 1 tablet by mouth daily.  •  omeprazole OTC (PriLOSEC OTC) 20 mg EC tablet, Take 20 mg by mouth daily before breakfast.  •  potassium chloride (KLOR-CON) 10 mEq CR tablet, Take 20 mEq by mouth 2 (two) times a day.  •  sacubitriL-valsartan (ENTRESTO) 24-26 mg per tablet, Take 1 tablet by mouth 2 (two) times a day.  •  thiamine 100 mg tablet, Take 100 mg by mouth daily.  •  torsemide (DEMADEX) 20 mg tablet, Take 40 mg by mouth every other day.    Comments about home medications:  -Patient's son states the patient has 2 days left of the 7-day course of ciprofloxacin 250 mg (BID).  -Patient's granddaughter states torsemide is currently 40 mg every other day.  -Patient's son states Entresto is currently 1 tablet BID, not 2 tablets BID as last prescribed.    Compliance:   -Consistent fills, no compliance concerns based on patient interview.

## 2023-08-03 NOTE — ASSESSMENT & PLAN NOTE
Minimally elevated on admission, likely demand in the setting of acute CHF  No chest pain and ECG without acute ST-T wave abnormalities    - Repeat ECG and troponin PRN for chest pain

## 2023-08-03 NOTE — ASSESSMENT & PLAN NOTE
Cr 1.8 on presentation, last 1.6 in 4/2023 with prior baseline ~1.0 from 11/2022  Likely 2/2 cardiorenal syndrome in the setting of acute CHF  S/p 500 cc NSS in ED due to soft BP    - BMP daily   - Encourage P.O. intake  - Avoid nephrotoxins, dose meds renally  - Heparin for DVT prophylaxis

## 2023-08-03 NOTE — H&P
Internal Medicine  History & Physical        CHIEF COMPLAINT   Fall     HISTORY OF PRESENT ILLNESS      This is a 89 y.o. 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    History was obtained from both patient, caregiver and family at bedside.  Patient reports that he was getting up out of bed this morning.  He felt very generally weak and tried to ambulate to the bathroom however after 2-3 steps he fell to the ground striking his head and right side of his body.  He denies any loss of consciousness but reports that he felt lightheaded and significantly weak in his knees.  This was an unwitnessed fall however his caregiver was in the other room and found him within a couple minutes.  She denies any witnessed shaking activity, tongue biting, confusion, or loss of bowel or bladder control.  Denies chest pain or palpitations.  Denies any headache, vision change, focal weakness or paresthesias.  Does report some soreness at his left forehead hematoma in addition to right shoulder and right pain since the fall.    Patient reports that over the last 2 weeks he has felt progressively more weak with associated dyspnea on exertion more than his baseline.  Also productive cough of clear/yellowish sputum over this time period.  He also had a small amount of lower extremity edema.  An outpatient provider subsequently increased his torsemide from 20 mg every other day to 40 mg every other day.  His lower extremity edema improved however his breathing did not.  He also reports that he drinks a good amount of liquid daily up to 8 glasses of water or juice.  Denies any dietary indiscretion.  Otherwise he reports compliance with his home medications including his Entresto which he takes daily.  He did not yet take his blood  pressure medications this morning.  Otherwise denies recent fevers, chills, headache, neck pain, abdominal pain, nausea, vomiting    The patient follows with cardiology Dr. Jeffrey at [facility].  He last saw them in 4/2023.  There were no major changes to his medication regimen at that time.  His last TTE was from 8/2022 which revealed mildly reduced LV systolic function with EF 40-45%, severely dilated RV with severely reduced RV systolic function, severe tricuspid regurgitation, and significantly dilated IVC.    Of note patient is currently being treated for UTI outpatient with 7-day course of ciprofloxacin initiated on 7/29/2023.     ED Course:  Vital signs: Afebrile, HR 86, BP 97/54, RR 20, pulse ox 89% on RA-->97% on 2 L NC  Pertinent labs: Sodium 142, potassium 4.2, chloride 107, bicarb 26, BUN 39, creatinine 1.8 (last 1.6 in 4/2023), glucose 115, AST/ALT WNL, alk phos 103, T. bili 0.8, magnesium 2.1, lactate 1.9, troponin 18-->20, , A1c 6.4, TSH 0.09, T 1.45, hemoglobin 11.5, WBC 5.21, platelets 145  Micro: None  Imaging:       ECG revealed rate controlled A-fib, no acute ST and T wave abnormalities     CT cervical spine revealed no acute fracture or subluxation  CT head reviewed no acute intracranial abnormality       CT chest revealed large left and small right pleural effusions with adjacent compressive atelectasis and significant LLL volume loss, cardiomegaly with IVC distention and contrast reflux suggestive of heart failure        CT abdomen/pelvis revealed a 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    PSHx:  No past surgical history on file.    PCP:   Zachery Hernandez MD    MEDICATIONS      Prior to Admission medications    Medication Sig Start  Date End Date Taking? Authorizing Provider   ciprofloxacin (CIPRO) 250 mg tablet Take 250 mg by mouth 2 (two) times a day. 7/29/23  Yes Clay Chambers MD   clopidogreL (PLAVIX) 75 mg tablet Take 75 mg by mouth daily.   Yes Clay Chambers MD   escitalopram (LEXAPRO) 5 mg tablet Take 5 mg by mouth daily.   Yes Clay Chambers MD   levothyroxine (SYNTHROID) 137 mcg tablet Take 137 mcg by mouth daily.   Yes Clay Chambers MD   metoprolol succinate XL (TOPROL-XL) 25 mg 24 hr tablet Take 25 mg by mouth daily.   Yes Clay Chambers MD   potassium chloride (KLOR-CON) 10 mEq CR tablet Take 20 mEq by mouth 2 (two) times a day.   Yes Clay Chambers MD   sacubitriL-valsartan (ENTRESTO) 24-26 mg per tablet Take 2 tablets by mouth 2 (two) times a day.   Yes Clay Chambers MD   torsemide (DEMADEX) 20 mg tablet Take 20 mg by mouth 2 (two) times a day.   Yes ProviderClay MD       Home medications were personally reviewed.    ALLERGIES      Patient has no known allergies.    FAMILY HISTORY      No family history on file.    SOCIAL HISTORY      Social History     Socioeconomic History   • Marital status:      Social Determinants of Health     Food Insecurity: No Food Insecurity (8/3/2023)    Hunger Vital Sign    • Worried About Running Out of Food in the Last Year: Never true    • Ran Out of Food in the Last Year: Never true       REVIEW OF SYSTEMS      Review of Systems   Constitutional: Negative for chills and fever.   HENT: Negative for rhinorrhea and sore throat.    Eyes: Negative for visual disturbance.   Respiratory: Positive for cough and shortness of breath.    Cardiovascular: Positive for leg swelling (improved). Negative for chest pain and palpitations.   Gastrointestinal: Negative for abdominal pain, blood in stool, diarrhea, nausea and vomiting.   Musculoskeletal: Positive for arthralgias. Negative for back pain and neck pain.    Neurological: Positive for light-headedness. Negative for seizures and syncope.       PHYSICAL EXAMINATION      Temp:  [36.6 °C (97.8 °F)] 36.6 °C (97.8 °F)  Heart Rate:  [71-86] 73  Resp:  [18-31] 19  BP: ()/(51-65) 105/57  There is no height or weight on file to calculate BMI.    Physical Exam  Vitals reviewed.   Constitutional:       General: He is awake. He is not in acute distress.  HENT:      Head: Normocephalic and atraumatic.   Eyes:      General: No scleral icterus.     Extraocular Movements: Extraocular movements intact.   Cardiovascular:      Rate and Rhythm: Normal rate and regular rhythm.   Pulmonary:      Effort: No respiratory distress (On 2 L NC).      Breath sounds: No wheezing.      Comments: Significantly diminished on the left, right bibasilar crackles noted  Abdominal:      Palpations: Abdomen is soft.      Tenderness: There is no abdominal tenderness. There is no guarding or rebound.      Comments: Colostomy in place with formed green/brown stool   Musculoskeletal:      Cervical back: Normal range of motion.   Skin:     General: Skin is warm and dry.      Capillary Refill: Capillary refill takes less than 2 seconds.   Neurological:      General: No focal deficit present.      Mental Status: He is alert and oriented to person, place, and time.      Sensory: Sensation is intact.      Comments: Diffuse weakness noted 3/5 in all extremities         LABS / IMAGING / EKG        Labs:  Results from last 7 days   Lab Units 08/03/23  0832   WBC K/uL 5.21   HEMOGLOBIN g/dL 11.5*   HEMATOCRIT % 36.3*   PLATELETS K/uL 145*     Results from last 7 days   Lab Units 08/03/23  0832   SODIUM mEQ/L 142   POTASSIUM mEQ/L 4.2   CHLORIDE mEQ/L 107   CO2 mEQ/L 26   BUN mg/dL 39*   CREATININE mg/dL 1.8*   CALCIUM mg/dL 8.9   ALBUMIN g/dL 3.5   BILIRUBIN TOTAL mg/dL 0.8   ALK PHOS IU/L 103   ALT IU/L 11   AST IU/L 36   GLUCOSE mg/dL 115*       Microbiology Data personally reviewed:  Microbiology Results     **  No results found for the last 720 hours. **          Imaging personally reviewed(does not include unread studies):  X-RAY SHOULDER RIGHT 2+ VIEWS    Result Date: 8/3/2023  IMPRESSION: No evidence of a right shoulder or humerus fracture. Possible rotator cuff calcific tendinosis. Marked limited evaluation of the right elbow. No evidence of a displaced elbow fracture. Lateral elbow examination can be considered for improved evaluation. COMMENT: 2 views right shoulder performed. Acromioclavicular and glenohumeral alignment are within normal limits for provided images (noting possible superior elevation of the humeral head related to rotator cuff tendinopathy, though this may be due to projectional artifact). No evidence of an acute fracture soft tissue swelling. Imaged portions of the right lung are clear. 2 views right humerus are performed. Possible rotator cuff calcific tendinosis. No evidence of an acute right humerus fracture. Imaged portions of the right lung are clear. No soft tissue swelling. 2 views of the right elbow performed. No evidence of displaced fracture. Markedly limited examination the absence of lateral projection.    X-RAY HUMERUS RIGHT    Result Date: 8/3/2023  IMPRESSION: No evidence of a right shoulder or humerus fracture. Possible rotator cuff calcific tendinosis. Marked limited evaluation of the right elbow. No evidence of a displaced elbow fracture. Lateral elbow examination can be considered for improved evaluation. COMMENT: 2 views right shoulder performed. Acromioclavicular and glenohumeral alignment are within normal limits for provided images (noting possible superior elevation of the humeral head related to rotator cuff tendinopathy, though this may be due to projectional artifact). No evidence of an acute fracture soft tissue swelling. Imaged portions of the right lung are clear. 2 views right humerus are performed. Possible rotator cuff calcific tendinosis. No evidence of an acute  right humerus fracture. Imaged portions of the right lung are clear. No soft tissue swelling. 2 views of the right elbow performed. No evidence of displaced fracture. Markedly limited examination the absence of lateral projection.    X-RAY ELBOW RIGHT 3+ VIEWS    Result Date: 8/3/2023  IMPRESSION: No evidence of a right shoulder or humerus fracture. Possible rotator cuff calcific tendinosis. Marked limited evaluation of the right elbow. No evidence of a displaced elbow fracture. Lateral elbow examination can be considered for improved evaluation. COMMENT: 2 views right shoulder performed. Acromioclavicular and glenohumeral alignment are within normal limits for provided images (noting possible superior elevation of the humeral head related to rotator cuff tendinopathy, though this may be due to projectional artifact). No evidence of an acute fracture soft tissue swelling. Imaged portions of the right lung are clear. 2 views right humerus are performed. Possible rotator cuff calcific tendinosis. No evidence of an acute right humerus fracture. Imaged portions of the right lung are clear. No soft tissue swelling. 2 views of the right elbow performed. No evidence of displaced fracture. Markedly limited examination the absence of lateral projection.    CT CHEST WITH IV CONTRAST    Result Date: 8/3/2023  IMPRESSION: 1. Large right and small left pleural effusions with adjacent compressive atelectasis and significant left lower lobe volume loss. 2. Cardiomegaly with IVC distention and contrast reflux in keeping with heart failure. 3. 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 4. Moderate T4 and mild L3 compression fractures, age indeterminate.  Clinically correlate.    CT ABDOMEN PELVIS WITH IV CONTRAST    Result Date: 8/3/2023  IMPRESSION: 1. Large right and small left pleural effusions with adjacent compressive atelectasis and significant left lower lobe volume loss. 2. Cardiomegaly with IVC  distention and contrast reflux in keeping with heart failure. 3. 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 4. Moderate T4 and mild L3 compression fractures, age indeterminate.  Clinically correlate.    CT HEAD WITHOUT IV CONTRAST    Result Date: 8/3/2023  IMPRESSION:  No acute intracranial abnormality.  Multiple scalp hematomas.  No evidence for acute fracture or traumatic subluxation the cervical spine.  Large left pleural effusion. COMMENT: A standard noncontrast head CT was performed. Noncontrast CT examination of the cervical spine performed following the standard protocol. Sagittal and coronal reformations rendered from axial source images. Images reviewed in bone and soft tissue windows. CT DOSE:  One or more dose reduction techniques (e.g. automated exposure control, adjustment of the mA and/or kV according to patient size, use of iterative reconstruction technique) utilized for this examination. Comparison:  No prior studies are available for comparison. Findings:  Sulci, ventricles and basal cisterns are prominent within normal limits for patient's age.  Mild periventricular and subcortical white matter hypoattenuation, nonspecific, likely sequela microangiopathic disease.  No acute hemorrhage, acute territorial infarct, or mass effect is seen.  There is no extra-axial fluid collection.  The visualized paranasal sinuses are clear. Mastoid air cells are clear.  Moderate-sized left frontal scalp hematoma.  Small right frontal scalp hematoma. Cervicothoracic alignment: Anatomic. Prevertebral soft tissues: Normal in thickness. Vertebral bodies: Normal in height.  No acute fractures.  Fusion of the posterior elements of C4 on C5. Intervertebral discs: Disc osteophyte complex throughout the cervical spine. Cervical and upper thoracic spinal canal: Patent. Axial images: Skull base: Normal. C1-2: Normal. C2-3: Facet arthrosis, right uncovertebral hypertrophic changes, contributing to  mild right foraminal narrowing. C3-4: Disc osteophyte complex with severe right greater than left uncovertebral hypertrophic changes, severe left facet hypertrophy, contributing to moderate bilateral foraminal narrowing. C4-5: Severe left and moderate right facet hypertrophy, contributing to moderate to moderate severe left foraminal narrowing. C5-6: Small disc osteophyte complex with facet arthrosis and left uncovertebral hypertrophic changes, contributing to mild left foraminal narrowing. C6-7: Mild posterior osseous spurring without consequence. C7-T1: Right facet arthrosis without consequence. Other: Large left pleural effusion.  Right apical pleural-parenchymal scarring.     CT CERVICAL SPINE WITHOUT IV CONTRAST    Result Date: 8/3/2023  IMPRESSION:  No acute intracranial abnormality.  Multiple scalp hematomas.  No evidence for acute fracture or traumatic subluxation the cervical spine.  Large left pleural effusion. COMMENT: A standard noncontrast head CT was performed. Noncontrast CT examination of the cervical spine performed following the standard protocol. Sagittal and coronal reformations rendered from axial source images. Images reviewed in bone and soft tissue windows. CT DOSE:  One or more dose reduction techniques (e.g. automated exposure control, adjustment of the mA and/or kV according to patient size, use of iterative reconstruction technique) utilized for this examination. Comparison:  No prior studies are available for comparison. Findings:  Sulci, ventricles and basal cisterns are prominent within normal limits for patient's age.  Mild periventricular and subcortical white matter hypoattenuation, nonspecific, likely sequela microangiopathic disease.  No acute hemorrhage, acute territorial infarct, or mass effect is seen.  There is no extra-axial fluid collection.  The visualized paranasal sinuses are clear. Mastoid air cells are clear.  Moderate-sized left frontal scalp hematoma.  Small right  frontal scalp hematoma. Cervicothoracic alignment: Anatomic. Prevertebral soft tissues: Normal in thickness. Vertebral bodies: Normal in height.  No acute fractures.  Fusion of the posterior elements of C4 on C5. Intervertebral discs: Disc osteophyte complex throughout the cervical spine. Cervical and upper thoracic spinal canal: Patent. Axial images: Skull base: Normal. C1-2: Normal. C2-3: Facet arthrosis, right uncovertebral hypertrophic changes, contributing to mild right foraminal narrowing. C3-4: Disc osteophyte complex with severe right greater than left uncovertebral hypertrophic changes, severe left facet hypertrophy, contributing to moderate bilateral foraminal narrowing. C4-5: Severe left and moderate right facet hypertrophy, contributing to moderate to moderate severe left foraminal narrowing. C5-6: Small disc osteophyte complex with facet arthrosis and left uncovertebral hypertrophic changes, contributing to mild left foraminal narrowing. C6-7: Mild posterior osseous spurring without consequence. C7-T1: Right facet arthrosis without consequence. Other: Large left pleural effusion.  Right apical pleural-parenchymal scarring.       ECG/Telemetry  I have independently reviewed the telemetry. No events for the last 24 hours.    ASSESSMENT AND PLAN           Pleural effusion, bilateral  Assessment & Plan  Large left and small right pleural effusion on CT chest  Suspect etiology most likely acute CHF due to right-sided heart failure however asymmetric nature raises concern for other etiology (ie, malignancy, paramedic)    - IR consult for thoracentesis, obtain pleural fluid studies    Respiratory failure with hypoxia (CMS/HCC)  Assessment & Plan  Hypoxia to 89% on arrival which improved on 2 L NC  In the setting of CT chest revealing large left and small right pleural effusions  No wheezing on exam, has had a productive cough likely cardiac in nature due to volume overload    - Plan as per acute CHF problem  and pleural effusion problem  - Wean O2 as able to maintain O2 sat >92%    Fall  Assessment & Plan  Fall after standing from bed, likely mechanical in the setting of generalized weakness and hypoxia versus a component of orthostatic hypotension  No associated loss of consciousness or seizure activity  CT head on admission without acute intracranial abnormality    - Fall precautions  - PT/OT    * Acute on chronic systolic (congestive) heart failure (CMS/HCC)  Assessment & Plan  Pt with a hx of HFmrEF (EF 40-45% on TTE 8/2022 with severely dilated RV with severely reduced RV systolic function)  Hx of pulmonary HTN previously on sildenafil  Follows with Cardiology Dr. Jeffrey at [facility] regimen: Metoprolol 25 mg daily, Entresto, Toremide 40 mg every other day  Dry weight 166 (reported)  Physical exam shows crackles, , CT chest shows large left and small right pleural effusions    CHF exacerbation likely in the s/o increased fluid intake vs dietary indiscretion vs component of progression of known RV dysfunction due to pulmonary HTN; otherwise low suspicion for ACS as trop plateau'd and ECG without acute ischemic change    S/p 40 mg IV Lasix in the ED    - Continue diuresis with IV lasix, re-dose based on Cr response and UOP  - Hold GDMT given soft BP and decompensated CHF, resume as able  - Cards c/s, preferably CHF/pHTN team  - CHF orderset: strict I&Os, daily standing wt, replete lytes for goal Mg>2 and K>4, cardiac diet with fluid restriction    Atrial fibrillation (CMS/HCC)  Assessment & Plan  History of chronic atrial fibrillation on metoprolol succinate 25 mg daily  Previously on Eliquis however does not appear to have tolerated due to diverticular bleeding; also may not be a good candidate due to falls  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    - Resume metoprolol as BP allows pending improvement in decompensated CHF  - Monitor on telemetry, repeat ECG as  needed    Hypertension  Assessment & Plan  Home regimen: Metoprolol 25 mg daily    - Hold BP meds given soft Bps, resume as able    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[facility])  Assessment & Plan  CT abdomen/pelvis noted an incidental 3.5 x 3.5 cm internal iliac artery aneurysm that is partially thrombosed  Stable in size from prior CT abdomen/pelvis in 10/2022    - Vascular surgery consulted-no acute surgical intervention, will follow-up as an outpatient  - Continue supportive management with BP control    Coronary artery disease  Assessment & Plan  Hx CABG in 1996, subsequent adenosine MPI negative for ischemia in 6/13  On Plavix  No chest pain, ECG without acute ischemic changes, trop plateau'd    - Continue Plavix    UTI (urinary tract infection)  Assessment & Plan  Patient with recent UTI being treated with 7-day course of ciprofloxacin as an outpatient (initiated 7/29/2023)  Denies urinary complaints    - Complete ciprofloxacin course      Elevated troponin  Assessment & Plan  Minimally elevated on admission, likely demand in the setting of acute CHF  No chest pain and ECG without acute ST-T wave abnormalities    - Repeat ECG and troponin PRN for chest pain    CALI (acute kidney injury) (CMS/[facility])  Assessment & Plan  Cr 1.8 on presentation, last 1.6 in 4/2023 with prior baseline ~1.0  Likely 2/2 cardiorenal syndrome in the setting of acute CHF  S/p 500 cc NSS in ED due to soft BP    - BMP twice daily  - Avoid nephrotoxins, dose meds renally  - Heparin for DVT prophylaxis       VTE Assessment: [place]    VTE Prophylaxis: Current anticoagulants:    •None      Palliative Care Screen:    Code Status: Full Code  Estimated discharge date: 8/6/2023       ATTENDING DOCUMENTATION  ALSO SEE ATTENDING ATTESTATION SECTION OF NOTE

## 2023-08-04 ENCOUNTER — APPOINTMENT (INPATIENT)
Dept: RADIOLOGY | Facility: HOSPITAL | Age: 88
DRG: 291 | End: 2023-08-04
Attending: PHYSICIAN ASSISTANT
Payer: MEDICARE

## 2023-08-04 ENCOUNTER — APPOINTMENT (INPATIENT)
Dept: RADIOLOGY | Facility: HOSPITAL | Age: 88
DRG: 291 | End: 2023-08-04
Payer: MEDICARE

## 2023-08-04 ENCOUNTER — APPOINTMENT (INPATIENT)
Dept: CARDIOLOGY | Facility: HOSPITAL | Age: 88
DRG: 291 | End: 2023-08-04
Payer: MEDICARE

## 2023-08-04 LAB
ALBUMIN SERPL-MCNC: 3.4 G/DL (ref 3.5–5.7)
ALP SERPL-CCNC: 97 IU/L (ref 34–125)
ALT SERPL-CCNC: 10 IU/L (ref 7–52)
ANION GAP SERPL CALC-SCNC: 10 MEQ/L (ref 3–15)
ANION GAP SERPL CALC-SCNC: 12 MEQ/L (ref 3–15)
AORTIC ROOT ANNULUS - M-MODE: 4.1 CM
AORTIC ROOT ANNULUS - M-MODE: 4.1 CM
APPEARANCE FLD: ABNORMAL
AST SERPL-CCNC: 35 IU/L (ref 13–39)
ATRIAL RATE: 84
BACTERIA URNS QL MICRO: ABNORMAL /HPF
BILIRUB DIRECT SERPL-MCNC: 0.3 MG/DL
BILIRUB SERPL-MCNC: 1.4 MG/DL (ref 0.3–1.2)
BILIRUB UR QL STRIP.AUTO: NEGATIVE MG/DL
BODY FLD TYPE: ABNORMAL
BSA FOR ECHO PROCEDURE: 1.98 M2
BUN SERPL-MCNC: 40 MG/DL (ref 7–25)
BUN SERPL-MCNC: 43 MG/DL (ref 7–25)
CALCIUM SERPL-MCNC: 8.1 MG/DL (ref 8.6–10.3)
CALCIUM SERPL-MCNC: 8.3 MG/DL (ref 8.6–10.3)
CHLORIDE SERPL-SCNC: 102 MEQ/L (ref 98–107)
CHLORIDE SERPL-SCNC: 104 MEQ/L (ref 98–107)
CHLORIDE UR-SCNC: 18 MEQ/L
CHOLEST SERPL-MCNC: 105 MG/DL
CLARITY UR REFRACT.AUTO: ABNORMAL
CO2 SERPL-SCNC: 23 MEQ/L (ref 21–31)
CO2 SERPL-SCNC: 27 MEQ/L (ref 21–31)
COLOR FLD: YELLOW
COLOR UR AUTO: YELLOW
CREAT SERPL-MCNC: 1.9 MG/DL (ref 0.7–1.3)
CREAT SERPL-MCNC: 2 MG/DL (ref 0.7–1.3)
CREAT UR-MCNC: 128.2 MG/DL
CUSP SEPARATION: 2 CM
DOP CALC LVOT STROKE VOLUME: 39.62 CM3
ERYTHROCYTE [DISTWIDTH] IN BLOOD BY AUTOMATED COUNT: 16.5 % (ref 11.6–14.4)
GFR SERPL CREATININE-BSD FRML MDRD: 31.5 ML/MIN/1.73M*2
GFR SERPL CREATININE-BSD FRML MDRD: 33.4 ML/MIN/1.73M*2
GLUCOSE BLD-MCNC: 196 MG/DL (ref 70–99)
GLUCOSE FLD-MCNC: 151 MG/DL
GLUCOSE SERPL-MCNC: 115 MG/DL (ref 70–99)
GLUCOSE SERPL-MCNC: 158 MG/DL (ref 70–99)
GLUCOSE UR STRIP.AUTO-MCNC: NEGATIVE MG/DL
HCT VFR BLDCO AUTO: 36.2 % (ref 40.1–51)
HDLC SERPL-MCNC: 27 MG/DL
HDLC SERPL: 3.9 {RATIO}
HGB BLD-MCNC: 11.5 G/DL (ref 13.7–17.5)
HGB UR QL STRIP.AUTO: ABNORMAL
HYALINE CASTS #/AREA URNS LPF: ABNORMAL /LPF
KETONES UR STRIP.AUTO-MCNC: NEGATIVE MG/DL
LDH FLD L TO P-CCNC: 158 U/L
LDH SERPL L TO P-CCNC: 160 IU/L (ref 98–271)
LDLC SERPL CALC-MCNC: 60 MG/DL
LEUKOCYTE ESTERASE UR QL STRIP.AUTO: 3
LVOT 2D: 2.3 CM
LVOT A: 4.15 CM2
LVOT MG: 1 MMHG
LVOT MV: 0.4 M/S
LVOT PEAK VELOCITY: 0.55 M/S
LVOT PG: 1 MMHG
LVOT STROKE VOLUME INDEX: 20.01 ML/M2
LVOT VTI: 9.54 CM
LYMPHOCYTES NFR FLD MANUAL: 87 %
MAGNESIUM SERPL-MCNC: 2 MG/DL (ref 1.8–2.5)
MAGNESIUM SERPL-MCNC: 2.1 MG/DL (ref 1.8–2.5)
MCH RBC QN AUTO: 33.2 PG (ref 28–33.2)
MCHC RBC AUTO-ENTMCNC: 31.8 G/DL (ref 32.2–36.5)
MCV RBC AUTO: 104.6 FL (ref 83–98)
MONOS+MACROS NFR FLD MANUAL: 11 %
MUCOUS THREADS URNS QL MICRO: ABNORMAL /LPF
MV E'TISSUE VEL-LAT: 0.07 M/S
MV E'TISSUE VEL-MED: 0.06 M/S
NEUTROPHILS NFR FLD MANUAL: 2 %
NITRITE UR QL STRIP.AUTO: NEGATIVE
NONHDLC SERPL-MCNC: 78 MG/DL
OSMOLALITY UR: 460 MOSM/KG (ref 100–1400)
PDW BLD AUTO: 11.3 FL (ref 9.4–12.4)
PH FLD: 7.5 [PH]
PH UR STRIP.AUTO: 6 [PH]
PLATELET # BLD AUTO: 125 K/UL (ref 150–350)
POCT TEST: ABNORMAL
POTASSIUM SERPL-SCNC: 4 MEQ/L (ref 3.5–5.1)
POTASSIUM SERPL-SCNC: 4.1 MEQ/L (ref 3.5–5.1)
POTASSIUM UR-SCNC: 87.5 MEQ/L
PROT FLD-MCNC: 4.8 G/DL
PROT SERPL-MCNC: 7.6 G/DL (ref 6–8.2)
PROT UR QL STRIP.AUTO: 2
QRS DURATION: 172
QT INTERVAL: 458
QTC CALCULATION(BAZETT): 532
R AXIS: 81
RBC # BLD AUTO: 3.46 M/UL (ref 4.5–5.8)
RBC # SNV: ABNORMAL CELLS/CU MM (ref 0–10000)
RBC #/AREA URNS HPF: ABNORMAL /HPF
RVOT VMAX: 0.32 M/S
RVOT VTI: 6.17 CM
SODIUM SERPL-SCNC: 137 MEQ/L (ref 136–145)
SODIUM SERPL-SCNC: 141 MEQ/L (ref 136–145)
SODIUM UR-SCNC: 18 MEQ/L
SP GR UR REFRACT.AUTO: >1.035
SPECIMEN SOURCE: ABNORMAL
SQUAMOUS URNS QL MICRO: ABNORMAL /HPF
T WAVE AXIS: -10
TR MAX PG: 16.65 MMHG
TR VTI: 16.8 CM
TR VTI: 60.5 CM
TRICUSPID VALVE PEAK REGURGITATION VELOCITY: 2.04 M/S
TRIGL SERPL-MCNC: 89 MG/DL
TV MEAN GRADIENT: 1 MMHG
TV MV D: 0.61 M/S
TV PEAK GRADIENT: 2 MMHG
TV REGURGITANT FRACTION: 21 %
UROBILINOGEN UR STRIP-ACNC: 0.2 EU/DL
VENTRICULAR RATE: 81
WBC # BLD AUTO: 5.23 K/UL (ref 3.8–10.5)
WBC # SNV AUTO: 1678 CELLS/CU MM (ref 0–200)
WBC #/AREA URNS HPF: ABNORMAL /HPF
YEAST #/AREA URNS HPF: 1 /HPF

## 2023-08-04 PROCEDURE — 80048 BASIC METABOLIC PNL TOTAL CA: CPT

## 2023-08-04 PROCEDURE — 93010 ELECTROCARDIOGRAM REPORT: CPT | Performed by: INTERNAL MEDICINE

## 2023-08-04 PROCEDURE — 25800000 HC PHARMACY IV SOLUTIONS

## 2023-08-04 PROCEDURE — 81003 URINALYSIS AUTO W/O SCOPE: CPT | Performed by: EMERGENCY MEDICINE

## 2023-08-04 PROCEDURE — 63700000 HC SELF-ADMINISTRABLE DRUG

## 2023-08-04 PROCEDURE — 87206 SMEAR FLUORESCENT/ACID STAI: CPT

## 2023-08-04 PROCEDURE — 83615 LACTATE (LD) (LDH) ENZYME: CPT

## 2023-08-04 PROCEDURE — 82945 GLUCOSE OTHER FLUID: CPT

## 2023-08-04 PROCEDURE — 87102 FUNGUS ISOLATION CULTURE: CPT

## 2023-08-04 PROCEDURE — 84300 ASSAY OF URINE SODIUM: CPT

## 2023-08-04 PROCEDURE — 93005 ELECTROCARDIOGRAM TRACING: CPT | Performed by: HOSPITALIST

## 2023-08-04 PROCEDURE — 85027 COMPLETE CBC AUTOMATED: CPT

## 2023-08-04 PROCEDURE — 80076 HEPATIC FUNCTION PANEL: CPT

## 2023-08-04 PROCEDURE — 63600000 HC DRUGS/DETAIL CODE: Mod: JZ

## 2023-08-04 PROCEDURE — 82438 ASSAY OTHER FLUID CHLORIDES: CPT

## 2023-08-04 PROCEDURE — 93306 TTE W/DOPPLER COMPLETE: CPT

## 2023-08-04 PROCEDURE — 83986 ASSAY PH BODY FLUID NOS: CPT

## 2023-08-04 PROCEDURE — 99232 SBSQ HOSP IP/OBS MODERATE 35: CPT | Performed by: INTERNAL MEDICINE

## 2023-08-04 PROCEDURE — 80061 LIPID PANEL: CPT

## 2023-08-04 PROCEDURE — 71045 X-RAY EXAM CHEST 1 VIEW: CPT

## 2023-08-04 PROCEDURE — 87015 SPECIMEN INFECT AGNT CONCNTJ: CPT

## 2023-08-04 PROCEDURE — 88112 CYTOPATH CELL ENHANCE TECH: CPT

## 2023-08-04 PROCEDURE — 84157 ASSAY OF PROTEIN OTHER: CPT

## 2023-08-04 PROCEDURE — 25000000 HC PHARMACY GENERAL: Performed by: PHYSICIAN ASSISTANT

## 2023-08-04 PROCEDURE — 89051 BODY FLUID CELL COUNT: CPT

## 2023-08-04 PROCEDURE — 99233 SBSQ HOSP IP/OBS HIGH 50: CPT | Performed by: HOSPITALIST

## 2023-08-04 PROCEDURE — 0W9B3ZZ DRAINAGE OF LEFT PLEURAL CAVITY, PERCUTANEOUS APPROACH: ICD-10-PCS | Performed by: RADIOLOGY

## 2023-08-04 PROCEDURE — 93306 TTE W/DOPPLER COMPLETE: CPT | Mod: 26 | Performed by: INTERNAL MEDICINE

## 2023-08-04 PROCEDURE — 87070 CULTURE OTHR SPECIMN AEROBIC: CPT

## 2023-08-04 PROCEDURE — 83735 ASSAY OF MAGNESIUM: CPT

## 2023-08-04 PROCEDURE — 83935 ASSAY OF URINE OSMOLALITY: CPT

## 2023-08-04 PROCEDURE — 36100345 IR THORACENTESIS

## 2023-08-04 PROCEDURE — 36415 COLL VENOUS BLD VENIPUNCTURE: CPT

## 2023-08-04 PROCEDURE — 21400000 HC ROOM AND CARE CCU/INTERMEDIATE

## 2023-08-04 PROCEDURE — C1729 CATH, DRAINAGE: HCPCS

## 2023-08-04 PROCEDURE — 82570 ASSAY OF URINE CREATININE: CPT

## 2023-08-04 RX ORDER — LIDOCAINE HYDROCHLORIDE 10 MG/ML
INJECTION, SOLUTION INFILTRATION; PERINEURAL
Status: COMPLETED | OUTPATIENT
Start: 2023-08-04 | End: 2023-08-04

## 2023-08-04 RX ADMIN — CEFTRIAXONE SODIUM 1 G: 1 INJECTION, POWDER, FOR SOLUTION INTRAMUSCULAR; INTRAVENOUS at 10:51

## 2023-08-04 RX ADMIN — LEVOTHYROXINE SODIUM 137 MCG: 0.11 TABLET ORAL at 06:23

## 2023-08-04 RX ADMIN — LIDOCAINE HYDROCHLORIDE 10 ML: 10 INJECTION, SOLUTION INFILTRATION; PERINEURAL at 09:40

## 2023-08-04 RX ADMIN — ACETAMINOPHEN 650 MG: 325 TABLET, FILM COATED ORAL at 07:58

## 2023-08-04 RX ADMIN — ESCITALOPRAM OXALATE 5 MG: 5 TABLET, FILM COATED ORAL at 07:58

## 2023-08-04 RX ADMIN — CLOPIDOGREL BISULFATE 75 MG: 75 TABLET ORAL at 07:58

## 2023-08-04 ASSESSMENT — ENCOUNTER SYMPTOMS
VOMITING: 0
SPEECH DIFFICULTY: 0
FATIGUE: 1
HEADACHES: 0
COUGH: 1
DIZZINESS: 0
NAUSEA: 0
DIFFICULTY URINATING: 1
SEIZURES: 0
SHORTNESS OF BREATH: 1
NUMBNESS: 0
DIARRHEA: 0
CHILLS: 0
WEAKNESS: 1
FEVER: 0

## 2023-08-04 ASSESSMENT — COGNITIVE AND FUNCTIONAL STATUS - GENERAL
CLIMB 3 TO 5 STEPS WITH RAILING: 2 - A LOT
STANDING UP FROM CHAIR USING ARMS: 2 - A LOT
WALKING IN HOSPITAL ROOM: 2 - A LOT
MOVING TO AND FROM BED TO CHAIR: 2 - A LOT
STANDING UP FROM CHAIR USING ARMS: 2 - A LOT
CLIMB 3 TO 5 STEPS WITH RAILING: 2 - A LOT
WALKING IN HOSPITAL ROOM: 2 - A LOT
MOVING TO AND FROM BED TO CHAIR: 2 - A LOT

## 2023-08-04 NOTE — PROGRESS NOTES
[facility] referral- No weekend discharge anticipated, if discharge occurs over the weekend, please call [phone] to complete referral.

## 2023-08-04 NOTE — CONSULTS
Physical Medicine and Rehabilitation Consult Note    Subjective     Samy Glass is a 89 y.o. male who was admitted for Acute on chronic systolic (congestive) heart failure (CMS/[facility]) [I50.23]. We were asked to see for rehabilitation needs. 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  Patient was trying to ambulate up a ramp and legs buckled from under him he has recently been treated for urinary tract infection.  He also is on increased dose of torsemide.  Patient admitted for pleural effusions and CHF.  Undergoing aggressive diuresis.  We have been asked to evaluate regarding his fall.  Of note at the time he was not using his walker which he is supposed to be using.  Patient underwent x-ray of the right hip and it showed a minimally displaced right inferior pubic ramus fracture.    Patient just had a blood pressure of systolic of 50 was placed in the bed now up to the 80s.  He denies headaches dizziness visual changes at present.  He has no chest pain nausea vomiting.  He is complaining of right shoulder pain and he has a slight right hip pain.  Otherwise he states overall he is feeling pretty good.  Patient's aide was present in the room and is very supportive.    Past records and images reviewed.    Medical History: No past medical history on file.    Surgical History: No past surgical history on file.    Social History:   Social History   Patient lives home with an aide 24/7.  Ramp to enter the home.  Uses a walker has a hospital bed wheelchair and frequently needs assistance of 1.Patient has been to [facility] skilled rehab in the past    Social History Narrative   • Not on file       Lives with:  Aid  Prior Function Level:  Requires assistance for majority of mobility was able to  walk with a walker      Family History: No family history on file.    History also provided by: Patient and chart      Allergies: Patient has no known allergies.    • clopidogreL  75 mg oral Daily   • escitalopram  5 mg oral Daily   • levothyroxine  137 mcg oral Daily (6:30a)   • [Provider Managed Hold] metoprolol succinate XL  25 mg oral Daily   • [Provider Managed Hold] sacubitriL-valsartan  2 tablet oral BID   • [Provider Managed Hold] torsemide  20 mg oral BID       Review of Systems   All 11 systems were reviewed and negative except as noted in the HPI.      Objective   Labs  I reviewed the below labs.  WBC is 5.23 and H/H 11.5/36.2 creatinine 1.9 hemoglobin A1c is 6.4  Lab Results   Component Value Date    WBC 5.23 08/04/2023    HGB 11.5 (L) 08/04/2023    HCT 36.2 (L) 08/04/2023     (L) 08/04/2023    CHOL 105 08/04/2023    TRIG 89 08/04/2023    HDL 27 (L) 08/04/2023    ALT 11 08/03/2023    AST 36 08/03/2023     08/04/2023    K 4.0 08/04/2023     08/04/2023    CREATININE 1.9 (H) 08/04/2023    BUN 40 (H) 08/04/2023    CO2 27 08/04/2023    TSH 0.09 (L) 08/03/2023    INR 1.4 08/03/2023    HGBA1C 6.4 (H) 08/03/2023     Imaging  I reviewed the below imaging:  X-ray of the hip from 8/3/2023:  TECHNIQUE: Three view pelvis and right hip     BONES: Osteopenia.  Minimally displaced right inferior pubic ramus fracture.     JOINTS: No dislocation. Osteoarthritic changes of both hips with joint space  narrowing, marginal osteophyte formation and marginal sclerosis.  Lower lumbar  spondylitic changes.     SOFT TISSUES: Excreted contrast material in the urinary bladder.     --  IMPRESSION:  Osteopenia.  Minimally displaced right inferior pubic ramus fracture.    ---------------------------------------  X-ray of the right shoulder and elbow from 8/3/2023:  IMPRESSION:  No evidence of a right shoulder or humerus fracture. Possible rotator cuff  calcific tendinosis.  Marked limited evaluation of the right  "elbow. No evidence of a displaced elbow  fracture. Lateral elbow examination can be considered for improved evaluation.    --------------------------------------------        Physical Exam  Visit Vitals  BP (!) 87/59 (BP Location: Left upper arm, Patient Position: Lying)   Pulse 82   Temp 36.3 °C (97.4 °F) (Oral)   Resp 18   Ht 1.854 m (6' 1\")   Wt 76 kg (167 lb 8.8 oz)   SpO2 99%   BMI 22.11 kg/m²     Examination  Patient is pleasant awake alert  Ecchymosis on the left forehead and frontal region of scalp  HEENT: No neck pain  Lungs: Breathing unlabored.  No rales or rhonchi.  Chest wall: No palpable pain  Heart: Regular.  Rate rhythm  Abdomen: Bowel sounds: Soft nontender ostomy functioning  Skin: No rash.  Extremities:   Right upper extremity: Patient is actually able to lift the right arm and denies any discomfort  Right lower extremity patient is able to hip flex abduct adduct and denies any discomfort  Neurological:  Alert and oriented with intact speech and cognition.  Sensation: subjectively preserved to touch.  Motor testing shows fairly good strength in both upper and lower extremities.  Is guarding around the right shoulder but able to give resisitance and also both hip flexors are slightly weak 3/5    Pt with hypotension SBp 50 up to 83- not symptomatic  Assessment   89 y.o. male being consulted for rehabilitation needs.  Plan of care was discussed with patient and Aid in team     1.  Mobility and self-care deficits: Appears as though patient's blood pressure is running very low.  He was down to the 50s or just prior to me entering the room.  I believe he has overall weakness secondary to his prior illness on top of his low blood pressure.  Patient has increased support at home with an aide.  I am hoping we can begin physical and Occupational Therapy here and get him mobilizing with the hope that he can transfer back home with his 24-hour aide assistance.  Obviously if he is requiring greater than the " minimal assistance for all mobilization he will require a skilled rehab facility.  Patient and aide are both hoping he can go directly home with home health services and his aide.  He actually is able to put some pressure through the right arm without much pain and right leg so I am hoping these will not become an issue during his mobility.  The biggest issue may be his blood pressure.  Monitor for orthostasis.  Begin physical and Occupational Therapy as blood pressure allows.  Patient should use a walker at all times.    2.  Hypotension: Patient's blood pressure is dropping.  To be seen by cardiology with their recommendations.  Will monitor vitals as we start to mobilize    3.  Right RTC strain: Patient seen by orthopedics weight-bear as tolerated and actually moving the shoulder pretty well.     4.  Right inferior pubic ramus fracture: Patient has minimal pain with ranging of the right leg.  We will see how he does once he is up and mobilizing.  He is weight-bear as tolerated at present.  Should use a walker at all times.    5.  CHF with history of coronary artery disease with CABG, chronic A-fib and hypotension: Patient being seen by cardiology.  He is presently undergoing diuresis for CHF.  Presently diuresis is being held secondary to hypotension.  Continue per their recommendations.  No new Assessment & Plan notes have been filed under this hospital service since the last note was generated.  Service: Physical Medicine and Rehabilitation            Shahnaz Peña MD  8/4/2023  1:11 PM

## 2023-08-04 NOTE — CONSULTS
Orthopedic Consult Note    Subjective     Samy Glass is a 89 y.o. male with PMH of CAD s/p CABG in 1996, HFrEF, HTN presents to the ED after a fall from standing height.  Patient reports that he fell and landed on his right side and hit his left forehead.  He denies any loss of consciousness or prodromal symptoms prior to his fall and claims that it was purely his legs giving out on him due to weakness.  Patient reports that he had been feeling weaker over the last several weeks.  Patient complains of right shoulder and right inguinal pain and was found to have a right inferior pubic ramus fracture x-ray for which orthopedics was consulted.  Patient ambulates independently.  Patient denies any further injuries and no other injuries were noted on survey.  Patient denies any prior orthopedic surgeries. Patient denies any  fevers, dizziness, CP, SOB, N/V, numbness, or tingling.       Pertinent radiology results reviewed..    Medical History:   No past medical history on file.    Surgical History:   No past surgical history on file.    Social History:   Social History     Social History Narrative   • Not on file       Family History: No family history on file.    Allergies: Patient has no known allergies.    Current Inpatient Medications   Medication Dose Route Frequency Provider Last Rate Last Admin   • acetaminophen (TYLENOL) tablet 650 mg  650 mg oral q6h PRN Betsy Duff MD   650 mg at 08/04/23 0758   • ciprofloxacin (CIPRO) tablet 250 mg  250 mg oral BID Lulu Browning MD   250 mg at 08/03/23 2029   • clopidogreL (PLAVIX) tablet 75 mg  75 mg oral Daily Elva Kirby DO   75 mg at 08/04/23 0758   • glucose chewable tablet 16-32 g of dextrose  16-32 g of dextrose oral PRN Elva Kirby DO        Or   • dextrose 40 % oral gel 15-30 g of dextrose  15-30 g of dextrose oral PRN Elva Kirby DO        Or   • glucagon (GLUCAGEN) injection 1 mg  1 mg  intramuscular PRN Elva Kirby DO        Or   • dextrose 50 % in water (D50) injection 12.5 g  25 mL intravenous PRN Elva Kirby DO       • escitalopram (LEXAPRO) tablet 5 mg  5 mg oral Daily Elva Kirby DO   5 mg at 08/04/23 0758   • levothyroxine (SYNTHROID) tablet 137 mcg  137 mcg oral Daily (6:30a) Elva Kirby DO   137 mcg at 08/04/23 0623   • [Provider Managed Hold] metoprolol succinate XL (TOPROL-XL) 24 hr ER tablet 25 mg  25 mg oral Daily Elva Kirby DO       • [Provider Managed Hold] sacubitriL-valsartan (ENTRESTO) 24-26 mg per tablet 2 tablet  2 tablet oral BID Elva Kirby DO       • [Provider Managed Hold] torsemide (DEMADEX) tablet 20 mg  20 mg oral BID Elva Kirby DO            Medication List      ASK your doctor about these medications    ascorbic acid 500 mg tablet  Commonly known as: VITAMIN C  Take 500 mg by mouth daily.  Dose: 500 mg     calcium carbonate 200 mg calcium (500 mg) chewable tablet  Commonly known as: TUMS  Take 1 tablet by mouth 2 (two) times a day.  Dose: 1 tablet     ciprofloxacin 250 mg tablet  Commonly known as: CIPRO  Take 250 mg by mouth 2 (two) times a day.  Dose: 250 mg     clopidogreL 75 mg tablet  Commonly known as: PLAVIX  Take 75 mg by mouth daily.  Dose: 75 mg     cyanocobalamin 500 mcg tablet  Commonly known as: VITAMIN B12  Take 500 mcg by mouth daily.  Dose: 500 mcg     escitalopram 5 mg tablet  Commonly known as: LEXAPRO  Take 5 mg by mouth daily.  Dose: 5 mg     levothyroxine 137 mcg tablet  Commonly known as: SYNTHROID  Take 137 mcg by mouth daily.  Dose: 137 mcg     magnesium oxide 400 mg (241.3 mg magnesium) tablet  Commonly known as: MAG-OX  Take 400 mg by mouth 2 (two) times a day.  Dose: 400 mg     melatonin 3 mg tablet  Take 3 mg by mouth nightly.  Dose: 3 mg     metoprolol succinate XL 25 mg 24 hr tablet  Commonly known  as: TOPROL-XL  Take 25 mg by mouth daily.  Dose: 25 mg     multivitamin tablet  Take 1 tablet by mouth daily.  Dose: 1 tablet     potassium chloride 10 mEq CR tablet  Commonly known as: KLOR-CON  Take 20 mEq by mouth 2 (two) times a day.  Dose: 20 mEq     PriLOSEC OTC 20 mg EC tablet  Take 20 mg by mouth daily before breakfast.  Dose: 20 mg  Generic drug: omeprazole OTC     sacubitriL-valsartan 24-26 mg per tablet  Commonly known as: ENTRESTO  Take 1 tablet by mouth 2 (two) times a day.  Dose: 1 tablet     thiamine 100 mg tablet  Take 100 mg by mouth daily.  Dose: 100 mg     torsemide 20 mg tablet  Commonly known as: DEMADEX  Take 40 mg by mouth every other day.  Dose: 40 mg          Review of Systems  Pertinent items are noted in HPI.    Objective     Labs  CBC Results       08/04/23 08/03/23     0614 0832    WBC 5.23 5.21    RBC 3.46 3.60    HGB 11.5 11.5    HCT 36.2 36.3    .6 100.8    MCH 33.2 31.9    MCHC 31.8 31.7     145        CMP Results       08/04/23 08/03/23 08/03/23     0614 1839 0832     140 142    K 4.0 4.5 4.2    Cl 104 105 107    CO2 27 25 26    Glucose 115 163 115    BUN 40 40 39    Creatinine 1.9 1.8 1.8    Calcium 8.3 8.5 8.9    Anion Gap 10 10 9    AST -- -- 36    ALT -- -- 11    Albumin -- -- 3.5    EGFR 33.4 35.5 35.5         Comment for K at 0832 on 08/03/23: Results obtained on plasma. Plasma Potassium values may be up to 0.4 mEQ/L less than serum values. The differences may be greater for patients with high platelet or white cell counts.            Imaging  *XR PELVIS: MIN DISP R INF SC FX  *CT CS: NO ACUTE FX/DL  *CT CAP: MIN DISP R INF SC FX  *XR R SHLD: NO ACUTE FX/DL  *XR R ELB: NO ACUTE FX/DL      Physical Exam    Gen  -Awake and alert, NAD     C spine  -No TTP.  -No palpable crepitus or step off.  -No pain with ROM.     RUE  -No obvious deformity.  -No TTP bony prominences. No palpable crepitus.  -Moderate pain with ROM shoulder elbow/wrist/fingers  -No pain with  ROM   -Motor intact axillary/msk/median/radial/ulnar/AIN/PIN  -SILT axillary/median/radial/ulnar n  -Compartments soft and compressible  -Hand WWP     LUE  -No obvious deformity.  -No TTP bony prominences. No palpable crepitus.  -No pain with ROM shoulder/elbow/wrist/fingers  -Motor intact axillary/msk/median/radial/ulnar/AIN/PIN  -SILT axillary/median/radial/ulnar n  -Compartments soft and compressible  -Hand WWP     RLE  -No obvious deformity.  -No TTP bony prominences. No palpable crepitus.  -Able to SLR, decreased compared to contralateral side 2/2 to pain in inguinal area.  -Mild pain on log roll  -Negative heel strike.  -No pain with ROM knee/ankle  -Motor intact quads/hamstrings/ehl/fhl/df/pf/peroneals  -SILT sural/saphenous/spn/dpn/tibial n  -Compartments soft and compressible  -Foot WWP, 2+DP     LLE  -No obvious deformity.  -No TTP bony prominences. No palpable crepitus.  -Able to SLR .Negative log roll/heel strike.  -No pain with ROM hip/knee/ankle  -Motor intact quads/hamstrings/ehl/fhl/df/pf/peroneals  -SILT sural/saphenous/spn/dpn/tibial n  -Compartments soft and compressible  -Foot WWP, 2+DP    Assessment   89 y.o. male with inguinal/hip pain secondary to a minimally displaced right inferior pubic rami fracture and acute on chronic right shoulder pain.     Plan     -No acute orthopedic intervention required at this time  -Weightbearing as tolerated right lower extremity  -Patient may follow-up as needed as an outpatient  -PT/OT  -DVT prophylaxis per primary team, on Plavix  -Pain control, recommend multimodal management  -Remainder of care per primary team  -Discussed case with attending.  -Attending cosign or attestation pending    Stephan Howell DO  Orthopedic Surgery, PGY-1  [place] Pager [phone]  [place] Pager: [phone]

## 2023-08-04 NOTE — DISCHARGE SUMMARY
Internal Medicine  Inpatient Discharge Summary        BRIEF OVERVIEW   Admitting Provider: Marjan Marshall MD  Attending Provider: Ludwig Montenegro MD Attending phys phone: [phone]    PCP: Zachery Hernandez [phone]    Admission Date: 8/3/2023  Discharge Date: 8/9/2023     DISCHARGE DIAGNOSES      Primary Discharge Diagnosis  Pleural effusion on left    Secondary Discharge Diagnoses  Active Hospital Problems    Diagnosis Date Noted   • Orthostatic hypotension 08/07/2023   • Acute on chronic systolic (congestive) heart failure (CMS/HCC) 08/03/2023   • Fall 08/03/2023   • Coronary artery disease 08/03/2023   • Respiratory failure with hypoxia (CMS/[facility]) 08/03/2023   • CALI (acute kidney injury) (CMS/[facility]) 08/03/2023   • 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[facility]) 08/03/2023   • Hypertension 08/03/2023   • Pleural effusion on left 08/03/2023   • Atrial fibrillation (CMS/[facility]) 08/03/2023   • Elevated troponin 08/03/2023   • UTI (urinary tract infection) 08/03/2023      Resolved Hospital Problems    Diagnosis Date Noted Date Resolved   • 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[facility]) 08/03/2023 08/03/2023       Active Problem List on Day of Discharge  Patient Active Problem List   Diagnosis   • Acute on chronic systolic (congestive) heart failure (CMS/HCC)   • Fall   • Coronary artery disease   • Respiratory failure with hypoxia (CMS/[facility])   • CALI (acute kidney injury) (CMS/[facility])   • 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[facility])   • Hypertension   • Pleural effusion on left   • Atrial fibrillation (CMS/[facility])   • Elevated troponin   • UTI (urinary tract infection)   • Orthostatic hypotension     SUMMARY OF HOSPITALIZATION      Presenting Problem/History of Present Illness    This is a 89 y.o. 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    At time of presentation:  History was obtained from both patient, caregiver and family at bedside.  Patient reports that he was getting up out of bed this morning.  He felt very generally weak and tried to ambulate to the bathroom however after 2-3 steps he fell to the ground striking his head and right side of his body.  He denies any loss of consciousness but reports that he felt lightheaded and significantly weak in his knees.  This was an unwitnessed fall however his caregiver was in the other room and found him within a couple minutes.  She denies any witnessed shaking activity, tongue biting, confusion, or loss of bowel or bladder control.  Denies chest pain or palpitations.  Denies any headache, vision change, focal weakness or paresthesias.  Does report some soreness at his left forehead hematoma in addition to right shoulder and right pain since the fall.    ED Course:  Vital signs: Afebrile, HR 86, BP 97/54, RR 20, pulse ox 89% on RA-->97% on 2 L NC  Pertinent labs: Sodium 142, potassium 4.2, chloride 107, bicarb 26, BUN 39, creatinine 1.8 (last 1.6 in 4/2023), glucose 115, AST/ALT WNL, alk phos 103, T. bili 0.8, magnesium 2.1, lactate 1.9, troponin 18-->20, , A1c 6.4, TSH 0.09, T 1.45, hemoglobin 11.5, WBC 5.21, platelets 145  Micro: None  Imaging:       ECG revealed rate controlled A-fib, no acute ST and T wave abnormalities     CT cervical spine revealed no acute fracture or subluxation  CT head reviewed no acute intracranial abnormality       CT chest revealed large left and small right pleural effusions with adjacent compressive atelectasis and significant LLL volume loss, cardiomegaly with IVC distention and contrast reflux suggestive of heart failure        CT abdomen/pelvis revealed a 3.5 x 3.5 cm right internal iliac artery aneurysm, partially thrombosed, moderate T4 and mild L3 compression fractures, age indeterminate     Management: 500 cc IVF given soft BP, 40 mg  IV furosemide      Hospital Course    #Large Left Pleural Effusion  - IR thoracocentesis 8/4 attempted, however during procedure, however patient's SBP dropped to 60s hence only 70 cc was retrieved for diagnostic purporses. Light's criteria reveal exudative pattern.  - 1.2 L of pleural fluid was able to be drained consistent with exudative effusion. He remained afebrile, without signs of pneumonia or infection. Cytology 8/4 and 8/7 were negative for malignant cells.   - Ultimately patient denied dyspnea, was saturating appropriately on room air on day of discharge.    #HFrEF  #Hypotension  - Was not thought to be decompensated despite initial diuresis and concern for volume overload. Diuretics and GDMT were held due to concern for intravascular depletion and hypotension. Orthostatic vitals positive 8/6, however improved 8/8.   - Cardiology was consulted. TTE revealed a small LV cavity due to compression from the RV, EF 45-50%, severe RV dilation with severely decreased systolic function, severe TR.  - Weight on day of discharge was 162 lbs on bed scale. Cr on day of discharge was 1.2. He was discharged on torsemide 10 mg daily. Home metoprolol, Entresto were discontinued given hypotension.  -Will need to follow-up with outpatient Cardiology regarding outpatient management.   -Will need repeat BMP, Mg as outpatient in 1 week    #Fall  #R Pelvic Fracture  - XR of right elbow/humerus/shoulder with no evidence of shoulder or humeral fracture. There was evidence of possible R rotator cuff calcific tendinosis.  - XR of right hip revealed a minimally displaced R inferior pubic ramus fracture.  - Orthopedic Surgery consulted with [facility], no acute intervention needed.  - Seen by PT/OT/PM&R  - XR of right knee demonstrated no fracture, dislocation or effusion  - CT of right hip redemonstrated the nondisplaced fractures of the right inferior pubic fortino, however with extension into the right acetabulum and fracture plane  superiorly to involve the right iliac bone  - Given findings of CT hip, orthopedic surgery, PM&R re-assessed patient. Was cleared for [facility]. Will need assistance with transfer at home with home health services. Overall patient experienced significant pain with standing 2/2 primarily to fracture and deconditioning.    - Discharged home with 24 hour home aid.     #Chronic Atrial Fibrillation  - Home metoprolol held & discontinued given hypotension. Overall heart rates remained controlled off of metoprolol.  - Not on AC given history of GI bleed.    #Coronary Artery Disease  - Maintained on Plavix and statin    #Aneurysm of Right Iliac Artery  - Seen by vascular surgery, who felt no acute intervention was needed    #Hypothyroidism  - Takes 137 mcg of levothyroxine at home  - TSH found to be 0.09  - Will need repeat TFTs in 4-6 weeks and titration of regimen    #Prior UTI  - Completed 7 day course of antibiotics in patient. Ciprofloxacin was switched to ceftriaxone of which he received one dose.     Exam on Day of Discharge  Physical Exam  Constitutional:       Appearance: He is normal weight.   Cardiovascular:      Rate and Rhythm: Normal rate and regular rhythm.      Pulses: Normal pulses.      Heart sounds: Normal heart sounds.   Pulmonary:      Breath sounds: Examination of the left-lower field reveals decreased breath sounds. Decreased breath sounds present.      Comments: Improved after Thoracentesis  Abdominal:      General: Abdomen is flat.   Skin:     General: Skin is warm and dry.      Capillary Refill: Capillary refill takes less than 2 seconds.   Neurological:      Mental Status: He is alert and oriented to person, place, and time. Mental status is at baseline.   Psychiatric:         Mood and Affect: Mood normal.         Behavior: Behavior normal.         Thought Content: Thought content normal.         Judgment: Judgment normal.      Consults During Admission  IP CONSULT TO CASE MANAGEMENT  IP CONSULT TO  NUTRITION SERVICES  IP CONSULT TO CARDIOLOGY  IP CONSULT TO WOUND OSTOMY CONTINENCE  IP CONSULT TO PHYSICAL MEDICINE REHAB  IP CONSULT TO ORTHOPEDIC SURGERY    DISCHARGE MEDICATIONS   New, changed, or stopped medications from this admission:       Medication List      START taking these medications    lidocaine 4 % adhesive patch,medicated topical patch  Commonly known as: ASPERCREME  Start taking on: August 10, 2023  Apply 1 patch topically daily.  Dose: 1 patch        CHANGE how you take these medications    torsemide 10 mg tablet  Commonly known as: DEMADEX  Start taking on: August 10, 2023  Take 1 tablet (10 mg total) by mouth daily.  Dose: 10 mg  What changed:   · medication strength  · how much to take  · when to take this        CONTINUE taking these medications    ascorbic acid 500 mg tablet  Commonly known as: VITAMIN C  Take 500 mg by mouth daily.  Dose: 500 mg     calcium carbonate 200 mg calcium (500 mg) chewable tablet  Commonly known as: TUMS  Take 1 tablet by mouth 2 (two) times a day.  Dose: 1 tablet     clopidogreL 75 mg tablet  Commonly known as: PLAVIX  Take 75 mg by mouth daily.  Dose: 75 mg     cyanocobalamin 500 mcg tablet  Commonly known as: VITAMIN B12  Take 500 mcg by mouth daily.  Dose: 500 mcg     escitalopram 5 mg tablet  Commonly known as: LEXAPRO  Take 5 mg by mouth daily.  Dose: 5 mg     levothyroxine 137 mcg tablet  Commonly known as: SYNTHROID  Take 137 mcg by mouth daily.  Dose: 137 mcg     magnesium oxide 400 mg (241.3 mg magnesium) tablet  Commonly known as: MAG-OX  Take 400 mg by mouth 2 (two) times a day.  Dose: 400 mg     melatonin 3 mg tablet  Take 3 mg by mouth nightly.  Dose: 3 mg     multivitamin tablet  Take 1 tablet by mouth daily.  Dose: 1 tablet     potassium chloride 10 mEq CR tablet  Commonly known as: KLOR-CON  Take 20 mEq by mouth 2 (two) times a day.  Dose: 20 mEq     PriLOSEC OTC 20 mg EC tablet  Take 20 mg by mouth daily before breakfast.  Dose: 20 mg  Generic  drug: omeprazole OTC     thiamine 100 mg tablet  Take 100 mg by mouth daily.  Dose: 100 mg        STOP taking these medications    ciprofloxacin 250 mg tablet  Commonly known as: CIPRO     metoprolol succinate XL 25 mg 24 hr tablet  Commonly known as: TOPROL-XL     sacubitriL-valsartan 24-26 mg per tablet  Commonly known as: ENTRESTO            Non-Medication orders at discharge:   Instructions for after discharge     Call provider for:  difficulty breathing, headache or visual disturbances      Call provider for:  extreme fatigue      Call provider for:  persistent dizziness or light-headedness      Call provider for:  persistent nausea or vomiting      Call provider for:  rash      Call provider for:  redness, tenderness, or signs of infection (pain, swelling, redness, odor or green/yellow discharge around incision site)      Call provider for:  severe uncontrolled pain      Call provider for:  temperature >100.4      Follow-up with Department:      [facility]   [phone]    [address], [address]  [place] [address]       Follow-up with Department:      [facility]   [phone]    [address]  [place] [address]       Follow-up with Provider:      Roshan Gaines MD   [phone]    [address]  [place] [address]       Follow-up with primary physician (PCP)      Needs repeat thyroid functions studies. TSH 0.09 from 8/3  Needs repeat BMP    Follow-up with primary physician (PCP)                      PROCEDURES / LABS / IMAGING      Operative Procedures  n/a    Other Procedures  n/a    Pertinent Labs  Results from last 7 days   Lab Units 08/09/23  0512   WBC K/uL 5.78   HEMOGLOBIN g/dL 10.2*   HEMATOCRIT % 31.0*   PLATELETS K/uL 121*     Results from last 7 days   Lab Units 08/09/23  0512 08/04/23  1851 08/04/23  0614   SODIUM mEQ/L 137   < > 141   POTASSIUM  mEQ/L 4.0   < > 4.0   CHLORIDE mEQ/L 105   < > 104   CO2 mEQ/L 24   < > 27   BUN mg/dL 38*   < > 40*   CREATININE mg/dL 1.2   < > 1.9*   CALCIUM mg/dL 8.0*   < > 8.3*   ALBUMIN g/dL  --   --  3.4*   BILIRUBIN TOTAL mg/dL  --   --  1.4*   ALK PHOS IU/L  --   --  97   ALT IU/L  --   --  10   AST IU/L  --   --  35   GLUCOSE mg/dL 109*   < > 115*    < > = values in this interval not displayed.       Pertinent Imaging  CT HIP RIGHT WITHOUT IV CONTRAST    Result Date: 8/8/2023  IMPRESSION: Nondisplaced fractures of the right inferior pubic ramus and right acetabulum with extension of the fracture plane superiorly to involve the right iliac bone.    X-RAY KNEE RIGHT 1 OR 2 VIEWS    Result Date: 8/8/2023  IMPRESSION: See above.     IR THORACENTESIS    Result Date: 8/7/2023  IMPRESSION: Successful ultrasound-guided left-sided thoracentesis with 1.2 L pleural fluid removed and sent to the lab.  All components of the time-out, debriefing, and handling of the specimen were conducted as per the ASAP Specimen Protocol. I certify that I have personally reviewed this examination and agree with Avril Nava's report. Gigi Berry M.D.    X-RAY CHEST 1 VIEW    Result Date: 8/7/2023  IMPRESSION: No evidence of pneumothorax.    X-RAY CHEST 1 VIEW    Result Date: 8/4/2023  IMPRESSION: No appreciable pneumothorax seen following left thoracentesis. Residual moderately large left pleural effusion and small right pleural effusion with underlying consolidative opacity.     IR THORACENTESIS    Result Date: 8/4/2023  IMPRESSION: Successful ultrasound-guided left-sided thoracentesis with 60 mL pleural fluid removed and sent to the lab.  All components of the time-out, debriefing, and handling of the specimen were conducted as per the ASAP Specimen Protocol. I certify that I have personally reviewed this examination and agree with Avril Nava's report. Constantino Potts MD    X-RAY HIP WITH OR WITHOUT PELVIS 2-3 VW RIGHT    Result Date:  8/3/2023  IMPRESSION: Osteopenia.  Minimally displaced right inferior pubic ramus fracture.     X-RAY SHOULDER RIGHT 2+ VIEWS    Result Date: 8/3/2023  IMPRESSION: No evidence of a right shoulder or humerus fracture. Possible rotator cuff calcific tendinosis. Marked limited evaluation of the right elbow. No evidence of a displaced elbow fracture. Lateral elbow examination can be considered for improved evaluation. COMMENT: 2 views right shoulder performed. Acromioclavicular and glenohumeral alignment are within normal limits for provided images (noting possible superior elevation of the humeral head related to rotator cuff tendinopathy, though this may be due to projectional artifact). No evidence of an acute fracture soft tissue swelling. Imaged portions of the right lung are clear. 2 views right humerus are performed. Possible rotator cuff calcific tendinosis. No evidence of an acute right humerus fracture. Imaged portions of the right lung are clear. No soft tissue swelling. 2 views of the right elbow performed. No evidence of displaced fracture. Markedly limited examination the absence of lateral projection.    X-RAY HUMERUS RIGHT    Result Date: 8/3/2023  IMPRESSION: No evidence of a right shoulder or humerus fracture. Possible rotator cuff calcific tendinosis. Marked limited evaluation of the right elbow. No evidence of a displaced elbow fracture. Lateral elbow examination can be considered for improved evaluation. COMMENT: 2 views right shoulder performed. Acromioclavicular and glenohumeral alignment are within normal limits for provided images (noting possible superior elevation of the humeral head related to rotator cuff tendinopathy, though this may be due to projectional artifact). No evidence of an acute fracture soft tissue swelling. Imaged portions of the right lung are clear. 2 views right humerus are performed. Possible rotator cuff calcific tendinosis. No evidence of an acute right humerus fracture.  Imaged portions of the right lung are clear. No soft tissue swelling. 2 views of the right elbow performed. No evidence of displaced fracture. Markedly limited examination the absence of lateral projection.    X-RAY ELBOW RIGHT 3+ VIEWS    Result Date: 8/3/2023  IMPRESSION: No evidence of a right shoulder or humerus fracture. Possible rotator cuff calcific tendinosis. Marked limited evaluation of the right elbow. No evidence of a displaced elbow fracture. Lateral elbow examination can be considered for improved evaluation. COMMENT: 2 views right shoulder performed. Acromioclavicular and glenohumeral alignment are within normal limits for provided images (noting possible superior elevation of the humeral head related to rotator cuff tendinopathy, though this may be due to projectional artifact). No evidence of an acute fracture soft tissue swelling. Imaged portions of the right lung are clear. 2 views right humerus are performed. Possible rotator cuff calcific tendinosis. No evidence of an acute right humerus fracture. Imaged portions of the right lung are clear. No soft tissue swelling. 2 views of the right elbow performed. No evidence of displaced fracture. Markedly limited examination the absence of lateral projection.    CT CHEST WITH IV CONTRAST    Result Date: 8/3/2023  IMPRESSION: 1. Large right and small left pleural effusions with adjacent compressive atelectasis and significant left lower lobe volume loss. 2. Cardiomegaly with IVC distention and contrast reflux in keeping with heart failure. 3. 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 4. Moderate T4 and mild L3 compression fractures, age indeterminate.  Clinically correlate.    CT ABDOMEN PELVIS WITH IV CONTRAST    Result Date: 8/3/2023  IMPRESSION: 1. Large right and small left pleural effusions with adjacent compressive atelectasis and significant left lower lobe volume loss. 2. Cardiomegaly with IVC distention and contrast  reflux in keeping with heart failure. 3. 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 4. Moderate T4 and mild L3 compression fractures, age indeterminate.  Clinically correlate.    CT HEAD WITHOUT IV CONTRAST    Result Date: 8/3/2023  IMPRESSION:  No acute intracranial abnormality.  Multiple scalp hematomas.  No evidence for acute fracture or traumatic subluxation the cervical spine.  Large left pleural effusion. COMMENT: A standard noncontrast head CT was performed. Noncontrast CT examination of the cervical spine performed following the standard protocol. Sagittal and coronal reformations rendered from axial source images. Images reviewed in bone and soft tissue windows. CT DOSE:  One or more dose reduction techniques (e.g. automated exposure control, adjustment of the mA and/or kV according to patient size, use of iterative reconstruction technique) utilized for this examination. Comparison:  No prior studies are available for comparison. Findings:  Sulci, ventricles and basal cisterns are prominent within normal limits for patient's age.  Mild periventricular and subcortical white matter hypoattenuation, nonspecific, likely sequela microangiopathic disease.  No acute hemorrhage, acute territorial infarct, or mass effect is seen.  There is no extra-axial fluid collection.  The visualized paranasal sinuses are clear. Mastoid air cells are clear.  Moderate-sized left frontal scalp hematoma.  Small right frontal scalp hematoma. Cervicothoracic alignment: Anatomic. Prevertebral soft tissues: Normal in thickness. Vertebral bodies: Normal in height.  No acute fractures.  Fusion of the posterior elements of C4 on C5. Intervertebral discs: Disc osteophyte complex throughout the cervical spine. Cervical and upper thoracic spinal canal: Patent. Axial images: Skull base: Normal. C1-2: Normal. C2-3: Facet arthrosis, right uncovertebral hypertrophic changes, contributing to mild right foraminal  narrowing. C3-4: Disc osteophyte complex with severe right greater than left uncovertebral hypertrophic changes, severe left facet hypertrophy, contributing to moderate bilateral foraminal narrowing. C4-5: Severe left and moderate right facet hypertrophy, contributing to moderate to moderate severe left foraminal narrowing. C5-6: Small disc osteophyte complex with facet arthrosis and left uncovertebral hypertrophic changes, contributing to mild left foraminal narrowing. C6-7: Mild posterior osseous spurring without consequence. C7-T1: Right facet arthrosis without consequence. Other: Large left pleural effusion.  Right apical pleural-parenchymal scarring.     CT CERVICAL SPINE WITHOUT IV CONTRAST    Result Date: 8/3/2023  IMPRESSION:  No acute intracranial abnormality.  Multiple scalp hematomas.  No evidence for acute fracture or traumatic subluxation the cervical spine.  Large left pleural effusion. COMMENT: A standard noncontrast head CT was performed. Noncontrast CT examination of the cervical spine performed following the standard protocol. Sagittal and coronal reformations rendered from axial source images. Images reviewed in bone and soft tissue windows. CT DOSE:  One or more dose reduction techniques (e.g. automated exposure control, adjustment of the mA and/or kV according to patient size, use of iterative reconstruction technique) utilized for this examination. Comparison:  No prior studies are available for comparison. Findings:  Sulci, ventricles and basal cisterns are prominent within normal limits for patient's age.  Mild periventricular and subcortical white matter hypoattenuation, nonspecific, likely sequela microangiopathic disease.  No acute hemorrhage, acute territorial infarct, or mass effect is seen.  There is no extra-axial fluid collection.  The visualized paranasal sinuses are clear. Mastoid air cells are clear.  Moderate-sized left frontal scalp hematoma.  Small right frontal scalp hematoma.  Cervicothoracic alignment: Anatomic. Prevertebral soft tissues: Normal in thickness. Vertebral bodies: Normal in height.  No acute fractures.  Fusion of the posterior elements of C4 on C5. Intervertebral discs: Disc osteophyte complex throughout the cervical spine. Cervical and upper thoracic spinal canal: Patent. Axial images: Skull base: Normal. C1-2: Normal. C2-3: Facet arthrosis, right uncovertebral hypertrophic changes, contributing to mild right foraminal narrowing. C3-4: Disc osteophyte complex with severe right greater than left uncovertebral hypertrophic changes, severe left facet hypertrophy, contributing to moderate bilateral foraminal narrowing. C4-5: Severe left and moderate right facet hypertrophy, contributing to moderate to moderate severe left foraminal narrowing. C5-6: Small disc osteophyte complex with facet arthrosis and left uncovertebral hypertrophic changes, contributing to mild left foraminal narrowing. C6-7: Mild posterior osseous spurring without consequence. C7-T1: Right facet arthrosis without consequence. Other: Large left pleural effusion.  Right apical pleural-parenchymal scarring.       OUTPATIENT  FOLLOW-UP / REFERRALS / PENDING TESTS      Outpatient Follow-Up Appointments  Encounter Information    This patient does not currently have any appointments scheduled.         Referrals  No orders of the defined types were placed in this encounter.      Test Results Pending at Discharge  Unresulted Labs (From admission, onward)     Start     Ordered    08/04/23 0929  Osmolality, urine  Once        Question:  Release to patient  Answer:  Immediate    08/04/23 0928    08/04/23 0926  ELECTROLYTES, URINE RANDOM  Once        Question:  Release to patient  Answer:  Immediate    08/04/23 0925    08/04/23 0926  Creatinine, urine, random  Once        Question:  Release to patient  Answer:  Immediate    08/04/23 0925    08/04/23 0600  CBC  Daily      Question:  Release to patient  Answer:  Immediate    Start Status   08/05/23 0600 Scheduled   08/06/23 0600 Scheduled   08/07/23 0600 Scheduled       08/03/23 1109    08/03/23 1800  Basic metabolic panel  2 times daily      Question:  Release to patient  Answer:  Immediate   Order ID Start Status   [id number] 08/04/23 1800 Needs to be Collected    08/05/23 0800 Scheduled    08/05/23 1800 Scheduled    08/06/23 0800 Scheduled    08/06/23 1800 Scheduled    08/07/23 0800 Scheduled    08/07/23 1800 Scheduled    08/08/23 0800 Scheduled    08/08/23 1800 Scheduled    08/09/23 0800 Scheduled    08/09/23 1800 Scheduled       08/03/23 1109    08/03/23 1800  Magnesium  2 times daily      Question:  Release to patient  Answer:  Immediate   Order ID Start Status   [id number] 08/04/23 1800 Needs to be Collected    08/05/23 0800 Scheduled    08/05/23 1800 Scheduled    08/06/23 0800 Scheduled    08/06/23 1800 Scheduled    08/07/23 0800 Scheduled    08/07/23 1800 Scheduled    08/08/23 0800 Scheduled    08/08/23 1800 Scheduled    08/09/23 0800 Scheduled    08/09/23 1800 Scheduled       08/03/23 1109    08/03/23 1335  Fungal culture / smear Pleural Fluid, Left  (Thoracentesis Procedure and Labs in IR)  Once        Question Answer Comment   Collect in IR Yes    Release to patient Immediate        08/03/23 1335    08/03/23 1335  AFB culture Pleural Fluid, Left  (Thoracentesis Procedure and Labs in IR)  Once        Question Answer Comment   Collect in IR Yes    Release to patient Immediate        08/03/23 1335    08/03/23 1335  Cytology, Fluids Pleural Fluid, Left  (Thoracentesis Procedure and Labs in IR)  Once        Question Answer Comment   Specimen Source Pleural Fluid, Left    Collect in IR Yes    Release to patient Immediate        08/03/23 1335    08/03/23 1335  Fungal Stain Pleural Fluid, Left  PROCEDURE ONCE        Question:  Release to patient  Answer:  Immediate    08/03/23 1335    08/03/23 1335  Fungal Culture Pleural Fluid, Left  PROCEDURE ONCE        Question:  Release to  patient  Answer:  Immediate    08/03/23 1335    08/03/23 1335  AFB stain Pleural Fluid, Left  PROCEDURE ONCE        Question:  Release to patient  Answer:  Immediate    08/03/23 1335    08/03/23 1335  AFB Culture Pleural Fluid, Left  PROCEDURE ONCE        Question:  Release to patient  Answer:  Immediate    08/03/23 1335    08/03/23 0826  UA with reflex culture  STAT        Question:  Release to patient  Answer:  Immediate    08/03/23 0825    08/03/23 0826  UA Reflex to Culture (Macroscopic)  PROCEDURE ONCE        Question:  Release to patient  Answer:  Immediate    08/03/23 0825                Important Issues to Address in Follow-Up  TFT 4-6 weeks, BMP, MG 1 week, BP check in 1 week  DISCHARGE DISPOSITION      Disposition: Home     Code Status At Discharge: Full Code    Physician Order for Life-Sustaining Treatment Document Status      No documents found                 ATTENDING DOCUMENTATION  ALSO SEE ATTENDING ATTESTATION SECTION OF NOTE     Medicine Attending:     I personally evaluated and examined the patient. I reviewed the chart, laboratory data and radiological examinations, and determined the diagnosis and plan. I discussed the case with the Resident/JESSEENP/SELINA/sub-I medical student and agree with the findings and plan as documented in the note except for my comments below or within the additional notes today.      Patient says feels well overall, breathing feels very good, only complaint still having R groin pain with movement/transfers. Wants to go home, reports has 24/7 home care and family support.   Afeb, vss, 111/61, 98%RA  Exam nad, aaox3, frail and chronically ill-appearing, though very pleasant and upbeat attitude, lung sounds much improved L side, R lung clear, abd soft nt/nd, no rrg, normal brown ostomy output, no leg edema, warm extremities, neuro strength 4+ out of 5 all extremities except RLE 4-/5 is limited due to pain, normal sensation, cranial nerves II through XII intact, scalp hematoma  stable  Labs creatinine 1.2, WBC 5, hemoglobin 10, platelets 121     Plan:  # Large L pleural effusion, suspect likely 2/2 decompensated CHF, however asymmetric/unilateral so obtained IR thoracentesis.   -IR only able to remove 60 cc pleural fluid because blood pressure dropped significantly during the procedure so therapeutic thora not performed.  Patient asymptomatic after returned from IR, blood pressure 80 systolic at that time.  He says systolic BP usually runs low 100s at baseline.  -L Pleural fluid studies x2 both consistent with exudate:  afebrile with no leukocytosis.  He is nontoxic-appearing.  No clinical evidence of pneumonia.  Pleural fluid cultures no growth to date, AFB and fungal stains negative, cytology negative x2 here.   -TTE EF 45-50%, severe RV overload, severe TR, unchanged compared to prior. he orthostatic hypotension, suspect intravascular depleted on presentation. Cr improving overall since held diuresis.   --orthostatic with standing 8/6 but BP improves after sat in chair. Continue hold metoprolol and entresto with hypotension, discussed with cardiology. Added b/l compressions stockings and encourage po. Started low dose torsemide 10mg daily per cardiology. Orthostatic precautions.    -Cardiology following with h/o severe RH failure     # R iliac artery aneurysm   - vascular surgery c/s recs no surgical intervention, recs OP f/u     # Fall, sounds mechanical, in setting of orthostatic hypotension, also hypoxia, FTT, is supposed to use walker  -X-ray right hip shows  Minimally displaced right inferior pubic ramus fracture.  Orthopedic surgery evaluated and recommends no surgical intervention required at this time.  WBAT RLE per Ortho.  - pt/ot/pmnr  - pain control   - with ongoing pain and significant difficulty with ambulation, checked CT R hip which showed nondisplaced fractures of right inferior pubic ramus and right acetabulum with extension of fracture plane superiorly to involve the  right iliac bone.  We asked orthopedic surgery to reevaluate, who recommends nonoperative treatment at this time.  Ortho again recommends WBAT to RLE and continue work with PT.  We discussed with PM&R today, asked PT to continue to work with patient with transfers and discussed with family who is comfortable and agreeable with taking patient home at this time.    # CALI, unknown baseline.  [place] patient was intravascular bleed on admission and held diuresis.  Postvoid bladder scan 158 cc, will continue to monitor. +protein gap, can hold off on spep/upep given cr improved to 1.2.  Will start 10 mg daily torsemide per cardiology at this time.     # Moderate T4 and mild L3 compression fractures, age indeterminate. No midline ttp on exam. Suspect chronic. C/s PMNR.     # Afib: not on AC, HR currently controlled. Hold bb with hypotension     # Hypothyroidism, tsh low, ft4 wnl. Continue home synthroid. OP f/u.     # Recent UTI, completed course of antibiotics here.     # h/o fourniere's gangrene: wounds completely healed per aide, will assess. Continue ostomy care.     Discharge home today with family and continued 24/7 care.      Ludwig Montenegro MD  Pager #[phone]     NOTE:   Some or all of the note above was created with the use of dictation software, please note this dictation software can have abnormalities in its ability to transcribe. Please contact me directly for anything that seems abnormal, for clarification.

## 2023-08-04 NOTE — PLAN OF CARE
Care Coordination Admission Assessment Note    General Information:  Readmission Within the last 30 days: no previous admission in last 30 days  Does patient have a :    Patient-Specific Goals (include timeframe): to be less SOB    Living Arrangements:  Arrived From: home  Current Living Arrangements: home  People in Home: other (see comments) (24/7 caregiver)  Home Accessibility: ramp to enter home  Living Arrangement Comments:      Housing Stability and Financial Resources (SDOH):  In the last 12 months, was there a time when you were not able to pay the mortgage or rent on time?: No  In the last 12 months, how many places have you lived?: 1  In the last 12 months, was there a time when you did not have a steady place to sleep or slept in a shelter (including now)?: No  How hard is it for you to pay for the very basics like food, housing, medical care, and heating?: Not hard at all    Functional Status Prior to Admission:   Assistive Device/Animal Currently Used at Home: walker, front-wheeled, wheelchair, hospital bed, grab bar  Functional Status Comments: needs assistance x1  IADL Comments: needs help with bathing, toileting, and cooking     Supports and Services:  Current Outpatient/Agency/Support Group: homecare agency  Type of Current Home Care Services: home health aide  History of home care episode or rehab stay: stay at [facility] and recently closed with [facility]    Discharge Needs Assessment:   Concerns to be Addressed: care coordination/care conferences, discharge planning  Current Discharge Risk: chronically ill  Anticipated Changes Related to Illness: inability to care for self    Patient/Family Anticipated Discharge Plan:  Patient/Family Anticipates Transition To: home  Patient/Family Anticipated Services at Transition: home health care    Connection to Community  Not applicable      Patient Choice:   Offered/Gave Vendor List: yes  Patient's Choice of Community Agency(s): [facility]  Chloe Narayanan  Patient and/or patient guardian/advocate was made aware of their right to choose a provider. A list of eligible providers was presented and reviewed with the patient and/or patient guardian/advocate in written and/or verbal form. The list delineates providers in the patient’s desired geographic area who are participating in the Medicare program and/or providers contracted with the patient’s primary insurance. The Medicare list and quality ratings were obtained from the Medicare.gov [medicare.gov] website.    Anticipated Discharge Plan:  Met with patient. Provided education and contact information for Care Coordination services.: yes  Anticipated Discharge Disposition: home with home health  Type of Home Care Services: home PT, home OT, nursing      Transportation Needs (SDOH):  Transportation Concerns: no car  Transportation Anticipated: family or friend will provide  Is Out of Hospital DNR needed at discharge?: no    In the past 12 months, has lack of transportation kept you from medical appointments or from getting medications?: No  In the past 12 months, has lack of transportation kept you from meetings, work, or from getting things needed for daily living?: No    Concerns - comments: YAHIR 8/7 vs 8/8. PCP, demos, and pharmacy verified. So would rather his dad return home with [facility] PT and OT services, refrral made. Patient has a 24/7 HHA in place. Patient's son Marko will provide the transportation home at discharge.

## 2023-08-04 NOTE — H&P
Internal Medicine  History & Physical        CHIEF COMPLAINT   Fall and shortness of breath     HISTORY OF PRESENT ILLNESS      This is a 89 y.o. 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 Patient reported feeling weak prior to the fall and hit his head on the wall. Patient denied LOC, dizziness, headache, fever, chills, vision changes, hemoptysis, N/V/D but has been feeling more fatigued,weak, coughing yellow to clear sputum and short of breath than usual.     NAEON. Patient slept well but is incontinent so I/Os are not accurately measured.       Intake/Output Summary (Last 24 hours) at 8/4/2023 0659  Last data filed at 8/3/2023 1444  24 Hour Net Input/Output from 7AM Yesterday   Intake --   Output 400 ml   Net -400 ml       Weights (last 5 days)     Date/Time Weight    08/04/23 0600 76 kg (167 lb 8.8 oz)    08/03/23 1655 77.1 kg (169 lb 15.6 oz)          PAST MEDICAL AND SURGICAL HISTORY      PMHx:  A-Fib  HTN  MI  Cardiomyopathy  Denise Gangrene    PSHx:  I&D w/ Debridement  CABG     PCP:   Zachery Hernandez MD    MEDICATIONS      Prior to Admission medications    Medication Sig Start Date End Date Taking? Authorizing Provider   ascorbic acid (VITAMIN C) 500 mg tablet Take 500 mg by mouth daily.   Yes ProviderClay MD   calcium carbonate (TUMS) 200 mg calcium (500 mg) chewable tablet Take 1 tablet by mouth 2 (two) times a day.   Yes ProviderClay MD   ciprofloxacin (CIPRO) 250 mg tablet Take 250 mg by mouth 2 (two) times a day. 7/29/23  Yes ProviderClay MD   clopidogreL (PLAVIX) 75 mg tablet  Take 75 mg by mouth daily.   Yes ProviderClay MD   cyanocobalamin (VITAMIN B12) 500 mcg tablet Take 500 mcg by mouth daily.   Yes Clay Chambers MD   escitalopram (LEXAPRO) 5 mg tablet Take 5 mg by mouth daily.   Yes Clay Chambers MD   levothyroxine (SYNTHROID) 137 mcg tablet Take 137 mcg by mouth daily.   Yes Clay Chambers MD   magnesium oxide (MAG-OX) 400 mg (241.3 mg magnesium) tablet Take 400 mg by mouth 2 (two) times a day.   Yes Clay Chambers MD   melatonin 3 mg tablet Take 3 mg by mouth nightly.   Yes Clay Chambers MD   metoprolol succinate XL (TOPROL-XL) 25 mg 24 hr tablet Take 25 mg by mouth daily.   Yes Clay Chambers MD   multivitamin tablet Take 1 tablet by mouth daily.   Yes Clay Chambers MD   omeprazole OTC (PriLOSEC OTC) 20 mg EC tablet Take 20 mg by mouth daily before breakfast.   Yes Clay Chambers MD   potassium chloride (KLOR-CON) 10 mEq CR tablet Take 20 mEq by mouth 2 (two) times a day.   Yes Clay Chambers MD   sacubitriL-valsartan (ENTRESTO) 24-26 mg per tablet Take 1 tablet by mouth 2 (two) times a day.   Yes Clay Chambers MD   thiamine 100 mg tablet Take 100 mg by mouth daily.   Yes Clay Chambers MD   torsemide (DEMADEX) 20 mg tablet Take 40 mg by mouth every other day.   Yes ProviderClay MD       Home medications were personally reviewed.    ALLERGIES      Patient has no known allergies.    FAMILY HISTORY      No family history on file.    SOCIAL HISTORY      Social History     Socioeconomic History   • Marital status:      Social Determinants of Health     Food Insecurity: No Food Insecurity (8/3/2023)    Hunger Vital Sign    • Worried About Running Out of Food in the Last Year: Never true    • Ran Out of Food in the Last Year: Never true       REVIEW OF SYSTEMS      Review of Systems   Constitutional: Positive for fatigue.  Negative for chills and fever.   Respiratory: Positive for cough and shortness of breath.    Cardiovascular: Positive for leg swelling. Negative for chest pain.   Gastrointestinal: Negative for diarrhea, nausea and vomiting.   Genitourinary: Positive for difficulty urinating.   Neurological: Positive for weakness. Negative for dizziness, seizures, syncope, speech difficulty, numbness and headaches.       PHYSICAL EXAMINATION      Temp:  [36.3 °C (97.4 °F)-36.7 °C (98 °F)] 36.3 °C (97.4 °F)  Heart Rate:  [65-87] 82  Resp:  [16-20] 18  BP: ()/(42-62) 87/59  Body mass index is 22.11 kg/m².    Physical Exam  Constitutional:       Appearance: Normal appearance. He is normal weight.   Cardiovascular:      Rate and Rhythm: Normal rate and regular rhythm.      Pulses: Normal pulses.   Pulmonary:      Breath sounds: Rales present.      Comments: On O2 NC  Abdominal:      General: Abdomen is flat.   Musculoskeletal:      Right lower leg: Edema present.      Left lower leg: Edema present.   Skin:     General: Skin is warm and dry.      Findings: Bruising and laceration present.      Comments: Hematoma on Left forehead    Laceration/dry blood on right side   Neurological:      Mental Status: He is alert.   Psychiatric:         Mood and Affect: Mood normal.         Behavior: Behavior normal.         Thought Content: Thought content normal.         Judgment: Judgment normal.       LABS / IMAGING / EKG        Labs:    Results from last 7 days   Lab Units 08/04/23  0614 08/03/23  1839 08/03/23  0832   SODIUM mEQ/L 141 140 142   POTASSIUM mEQ/L 4.0 4.5 4.2   CHLORIDE mEQ/L 104 105 107   CO2 mEQ/L 27 25 26   BUN mg/dL 40* 40* 39*   CREATININE mg/dL 1.9* 1.8* 1.8*   GLUCOSE mg/dL 115* 163* 115*   CALCIUM mg/dL 8.3* 8.5* 8.9     Results from last 7 days   Lab Units 08/04/23  0614 08/03/23  0832   WBC K/uL 5.23 5.21   HEMOGLOBIN g/dL 11.5* 11.5*   HEMATOCRIT % 36.2* 36.3*   PLATELETS K/uL 125* 145*     Results from last 7 days    Lab Units 08/04/23  0614   CHOLESTEROL mg/dL 105   TRIGLYCERIDES mg/dL 89   HDL mg/dL 27*   LDL CALC mg/dL 60     Results from last 7 days   Lab Units 08/04/23  0614 08/03/23  1839 08/03/23  0832   MAGNESIUM mg/dL 2.1 2.0 2.1         Microbiology Data personally reviewed:  Microbiology Results     ** No results found for the last 720 hours. **          Imaging personally reviewed(does not include unread studies):    X-Ray Hip With or Without Pelvis 8/3/2023  IMPRESSION:  Osteopenia.  Minimally displaced right inferior pubic ramus fracture.    X-Ray of Right shoulder 2+ views , humerus and elbow 3+ views  IMPRESSION:  No evidence of a right shoulder or humerus fracture. Possible rotator cuff  calcific tendinosis.  Marked limited evaluation of the right elbow. No evidence of a displaced elbow  fracture. Lateral elbow examination can be considered for improved evaluation.    CT Chest and Abdomen W/ IV Contrast 8/3/2023    IMPRESSION:  1. Large right and small left pleural effusions with adjacent compressive  atelectasis and significant left lower lobe volume loss.     2. Cardiomegaly with IVC distention and contrast reflux in keeping with heart  failure.     3. 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     4. Moderate T4 and mild L3 compression fractures, age indeterminate.  Clinically  Correlate.    CT Head and Cervical Spine W/O IV Contrast 8/3/2023    IMPRESSION:  No acute intracranial abnormality.  Multiple scalp hematomas.  No  evidence for acute fracture or traumatic subluxation the cervical spine.  Large  left pleural effusion.    X-Ray Chest 8/4/2023  S/P L Thor,   Impression: No appreciable pneumothorax seen following left thoracentesis.      ECG/Telemetry    ECG 12 lead  Order: [id number]   Status: Preliminary result      Visible to patient: No (not released)      Next appt with me: None      Dx: Acute cystitis without hematuria      0 Result Notes      Component 8/4/23 0552    Ventricular rate 81 P    Atrial rate 84 P    QRS duration 172 P    QT Interval 458 P    QTC Calculation(Bazett) 532 P    R Axis 81 P    T Wave Axis -10 P               Narrative    Atrial fibrillation   Right bundle branch block   T wave abnormality, consider lateral ischemia   Abnormal ECG   When compared with ECG of 03-AUG-2023 08:18,   Criteria for Anterior infarct are no longer Present      Specimen Collected: 08/04/23 05:52 Last Resulted: 08/04/23            ASSESSMENT AND PLAN           Pleural effusion, bilateral  Assessment & Plan  Large left and small right pleural effusion on CT chest  Suspect etiology most likely acute CHF due to right-sided heart failure however asymmetric nature raises concern for other etiology (ie, malignancy, paramedic)    - Obtained Pleural studies. Only 60 mL clear yellow pleural fluid d/t patient going hypotensive during procedure  - Fluid protein- 4.8 and Serum protein 8.3. Ratio= .58 .  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 Jeffrey at [facility]  Home regimen: Metoprolol 25 mg daily, Entresto, Toremide 40 mg every other day  Dry weight 166 (reported). Weight at 167  Physical exam shows crackles, , CT chest shows large left and small right pleural effusions    CHF exacerbation likely in the s/o increased fluid intake vs dietary indiscretion vs component of progression of known RV dysfunction due to pulmonary HTN; otherwise low suspicion for ACS as trop plateau'd and ECG without acute ischemic change    S/p 40 mg IV Lasix in the ED      - Hold diuresis with IV lasix, re-dose based on Cr response and UOP  - Order Echo to evaluate heart function  - Hold GDMT given soft BP and decompensated CHF, resume as  able  - Cards c/s, preferably CHF/pHTN team  - CHF orderset: strict I&Os, daily standing wt, replete lytes for goal Mg>2 and K>4, cardiac diet with fluid restriction    Elevated troponin  Assessment & Plan  Minimally elevated on admission, likely demand in the setting of acute CHF  No chest pain and ECG without acute ST-T wave abnormalities    - Repeat ECG and troponin PRN for chest pain    Atrial fibrillation (CMS/[facility])  Assessment & Plan  History of chronic atrial fibrillation on metoprolol succinate 25 mg daily  Previously on Eliquis however does not appear to have tolerated due to diverticular bleeding; also may not be a good candidate due to falls  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    - Resume metoprolol as BP allows pending improvement in decompensated CHF  - Monitor on telemetry, repeat ECG as needed    CALI (acute kidney injury) (CMS/[facility])  Assessment & Plan  Cr 1.8 on presentation, last 1.6 in 4/2023 with prior baseline ~1.0 from 11/2022  Likely 2/2 cardiorenal syndrome in the setting of acute CHF  S/p 500 cc NSS in ED due to soft BP    - BMP twice daily  - Avoid nephrotoxins, dose meds renally  - Heparin for DVT prophylaxis    Respiratory failure with hypoxia (CMS/[facility])  Assessment & Plan  Hypoxia to 89% on arrival which improved on 2 L NC  In the setting of CT chest revealing large left and small right pleural effusions  No wheezing on exam, has had a productive cough likely cardiac in nature due to volume overload    - Plan as per acute CHF problem and pleural effusion problem  - Wean O2 as able to maintain O2 sat >92% (O2 sat is currently at 95 on 2L NC)    UTI (urinary tract infection)  Assessment & Plan  Patient with recent UTI being treated with 7-day course of ciprofloxacin as an outpatient (initiated 7/29/2023)  Denies urinary complaints    - Complete ciprofloxacin course      Hypertension  Assessment & Plan  Home regimen: Metoprolol 25 mg daily    - Hold BP meds given soft  Bps, resume as able    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)  Assessment & Plan  CT abdomen/pelvis noted an incidental 3.5 x 3.5 cm internal iliac artery aneurysm that is partially thrombosed  Stable in size from prior CT abdomen/pelvis in 10/2022    - Vascular surgery consulted-no acute surgical intervention, will follow-up as an outpatient  - Continue supportive management with BP control    Coronary artery disease  Assessment & Plan  Hx CABG in 1996, subsequent adenosine MPI negative for ischemia in 6/13  On Plavix  No chest pain, ECG without acute ischemic changes, trop plateau'd    - Continue Plavix    Fall  Assessment & Plan  Fall after standing from bed, likely mechanical in the setting of generalized weakness and hypoxia versus a component of orthostatic hypotension  No associated loss of consciousness or seizure activity  CT head on admission without acute intracranial abnormality    Plan:   - Ortho consult- no intervention needed for hip fracture  - Fall precautions  - PT/OT  - orthostatic vitals       VTE Assessment: [place]    VTE Prophylaxis: Current anticoagulants:    •None      Code Status: Full Code  Estimated discharge date: 8/6/2023     ATTENDING DOCUMENTATION  ALSO SEE ATTENDING ATTESTATION SECTION OF NOTE

## 2023-08-04 NOTE — PROGRESS NOTES
Internal Medicine  Daily Progress Note       SUBJECTIVE   This is a 89 y.o. year-old male admitted on 8/3/2023 with Acute on chronic systolic (congestive) heart failure (CMS/HCC) [I50.23].    Interval History: NAEON. Patient slept well but is incontinent so I/Os are not accurately measured.      OBJECTIVE      Vital signs in last 24 hours:  Temp:  [36.3 °C (97.4 °F)-36.5 °C (97.7 °F)] 36.3 °C (97.4 °F)  Heart Rate:  [65-87] 82  Resp:  [16-20] 18  BP: ()/(42-62) 87/59  Temp (24hrs), Av.4 °C (97.5 °F), Min:36.3 °C (97.4 °F), Max:36.5 °C (97.7 °F)    Intake/Output    None       Weights (last 5 days)     Date/Time Weight    23 0600 76 kg (167 lb 8.8 oz)    23 1655 77.1 kg (169 lb 15.6 oz)        PHYSICAL EXAMINATION      Physical Exam    Constitutional:       Appearance: Normal appearance. He is normal weight.   Cardiovascular:      Rate and Rhythm: Normal rate and regular rhythm.      Pulses: Normal pulses.   Pulmonary:      Breath sounds: Rales present.      Comments: On O2 NC  Abdominal:      General: Abdomen is flat.   Musculoskeletal:      Right lower leg: Edema present.      Left lower leg: Edema present.   Skin:     General: Skin is warm and dry.      Findings: Bruising and laceration present.      Comments: Hematoma on Left forehead    Laceration/dry blood on right side   Neurological:      Mental Status: He is alert.   Psychiatric:         Mood and Affect: Mood normal.         Behavior: Behavior normal.         Thought Content: Thought content normal.         Judgment: Judgment normal.    LINES, CATHETERS, DRAINS, AIRWAYS, AND WOUNDS   Lines, Drains, Airways, Wounds:  Peripheral IV (Adult) 23 Anterior;Left Forearm (Active)   Number of days: 1       Colostomy LLQ (Active)   Number of days: 1       Comments:      LABS / IMAGING / TELE      Labs    Results from last 7 days   Lab Units 23  0614 23  1839 23  0832   SODIUM mEQ/L 141 140 142   POTASSIUM mEQ/L 4.0 4.5  4.2   CHLORIDE mEQ/L 104 105 107   CO2 mEQ/L 27 25 26   BUN mg/dL 40* 40* 39*   CREATININE mg/dL 1.9* 1.8* 1.8*   CALCIUM mg/dL 8.3* 8.5* 8.9   ALBUMIN g/dL  --   --  3.5   BILIRUBIN TOTAL mg/dL  --   --  0.8   ALK PHOS IU/L  --   --  103   ALT IU/L  --   --  11   AST IU/L  --   --  36   GLUCOSE mg/dL 115* 163* 115*     Results from last 7 days   Lab Units 08/04/23  0614 08/03/23  0832   WBC K/uL 5.23 5.21   HEMOGLOBIN g/dL 11.5* 11.5*   HEMATOCRIT % 36.2* 36.3*   PLATELETS K/uL 125* 145*       Results from last 7 days   Lab Units 08/04/23  0614 08/03/23  1839 08/03/23  0832   MAGNESIUM mg/dL 2.1 2.0 2.1     Results from last 7 days   Lab Units 08/04/23  0614   CHOLESTEROL mg/dL 105   TRIGLYCERIDES mg/dL 89   HDL mg/dL 27*   LDL CALC mg/dL 60       Imaging personally reviewed(does not include unread studies):  X-RAY CHEST 1 VIEW    Result Date: 8/4/2023  IMPRESSION: No appreciable pneumothorax seen following left thoracentesis. Residual moderately large left pleural effusion and small right pleural effusion with underlying consolidative opacity.     X-RAY HIP WITH OR WITHOUT PELVIS 2-3 VW RIGHT    Result Date: 8/3/2023  IMPRESSION: Osteopenia.  Minimally displaced right inferior pubic ramus fracture.     X-RAY SHOULDER RIGHT 2+ VIEWS    Result Date: 8/3/2023  IMPRESSION: No evidence of a right shoulder or humerus fracture. Possible rotator cuff calcific tendinosis. Marked limited evaluation of the right elbow. No evidence of a displaced elbow fracture. Lateral elbow examination can be considered for improved evaluation. COMMENT: 2 views right shoulder performed. Acromioclavicular and glenohumeral alignment are within normal limits for provided images (noting possible superior elevation of the humeral head related to rotator cuff tendinopathy, though this may be due to projectional artifact). No evidence of an acute fracture soft tissue swelling. Imaged portions of the right lung are clear. 2 views right humerus are  performed. Possible rotator cuff calcific tendinosis. No evidence of an acute right humerus fracture. Imaged portions of the right lung are clear. No soft tissue swelling. 2 views of the right elbow performed. No evidence of displaced fracture. Markedly limited examination the absence of lateral projection.    X-RAY HUMERUS RIGHT    Result Date: 8/3/2023  IMPRESSION: No evidence of a right shoulder or humerus fracture. Possible rotator cuff calcific tendinosis. Marked limited evaluation of the right elbow. No evidence of a displaced elbow fracture. Lateral elbow examination can be considered for improved evaluation. COMMENT: 2 views right shoulder performed. Acromioclavicular and glenohumeral alignment are within normal limits for provided images (noting possible superior elevation of the humeral head related to rotator cuff tendinopathy, though this may be due to projectional artifact). No evidence of an acute fracture soft tissue swelling. Imaged portions of the right lung are clear. 2 views right humerus are performed. Possible rotator cuff calcific tendinosis. No evidence of an acute right humerus fracture. Imaged portions of the right lung are clear. No soft tissue swelling. 2 views of the right elbow performed. No evidence of displaced fracture. Markedly limited examination the absence of lateral projection.    X-RAY ELBOW RIGHT 3+ VIEWS    Result Date: 8/3/2023  IMPRESSION: No evidence of a right shoulder or humerus fracture. Possible rotator cuff calcific tendinosis. Marked limited evaluation of the right elbow. No evidence of a displaced elbow fracture. Lateral elbow examination can be considered for improved evaluation. COMMENT: 2 views right shoulder performed. Acromioclavicular and glenohumeral alignment are within normal limits for provided images (noting possible superior elevation of the humeral head related to rotator cuff tendinopathy, though this may be due to projectional artifact). No evidence of  an acute fracture soft tissue swelling. Imaged portions of the right lung are clear. 2 views right humerus are performed. Possible rotator cuff calcific tendinosis. No evidence of an acute right humerus fracture. Imaged portions of the right lung are clear. No soft tissue swelling. 2 views of the right elbow performed. No evidence of displaced fracture. Markedly limited examination the absence of lateral projection.    CT CHEST WITH IV CONTRAST    Result Date: 8/3/2023  IMPRESSION: 1. Large right and small left pleural effusions with adjacent compressive atelectasis and significant left lower lobe volume loss. 2. Cardiomegaly with IVC distention and contrast reflux in keeping with heart failure. 3. 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 4. Moderate T4 and mild L3 compression fractures, age indeterminate.  Clinically correlate.    CT ABDOMEN PELVIS WITH IV CONTRAST    Result Date: 8/3/2023  IMPRESSION: 1. Large right and small left pleural effusions with adjacent compressive atelectasis and significant left lower lobe volume loss. 2. Cardiomegaly with IVC distention and contrast reflux in keeping with heart failure. 3. 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 4. Moderate T4 and mild L3 compression fractures, age indeterminate.  Clinically correlate.    CT HEAD WITHOUT IV CONTRAST    Result Date: 8/3/2023  IMPRESSION:  No acute intracranial abnormality.  Multiple scalp hematomas.  No evidence for acute fracture or traumatic subluxation the cervical spine.  Large left pleural effusion. COMMENT: A standard noncontrast head CT was performed. Noncontrast CT examination of the cervical spine performed following the standard protocol. Sagittal and coronal reformations rendered from axial source images. Images reviewed in bone and soft tissue windows. CT DOSE:  One or more dose reduction techniques (e.g. automated exposure control, adjustment of the mA and/or  kV according to patient size, use of iterative reconstruction technique) utilized for this examination. Comparison:  No prior studies are available for comparison. Findings:  Sulci, ventricles and basal cisterns are prominent within normal limits for patient's age.  Mild periventricular and subcortical white matter hypoattenuation, nonspecific, likely sequela microangiopathic disease.  No acute hemorrhage, acute territorial infarct, or mass effect is seen.  There is no extra-axial fluid collection.  The visualized paranasal sinuses are clear. Mastoid air cells are clear.  Moderate-sized left frontal scalp hematoma.  Small right frontal scalp hematoma. Cervicothoracic alignment: Anatomic. Prevertebral soft tissues: Normal in thickness. Vertebral bodies: Normal in height.  No acute fractures.  Fusion of the posterior elements of C4 on C5. Intervertebral discs: Disc osteophyte complex throughout the cervical spine. Cervical and upper thoracic spinal canal: Patent. Axial images: Skull base: Normal. C1-2: Normal. C2-3: Facet arthrosis, right uncovertebral hypertrophic changes, contributing to mild right foraminal narrowing. C3-4: Disc osteophyte complex with severe right greater than left uncovertebral hypertrophic changes, severe left facet hypertrophy, contributing to moderate bilateral foraminal narrowing. C4-5: Severe left and moderate right facet hypertrophy, contributing to moderate to moderate severe left foraminal narrowing. C5-6: Small disc osteophyte complex with facet arthrosis and left uncovertebral hypertrophic changes, contributing to mild left foraminal narrowing. C6-7: Mild posterior osseous spurring without consequence. C7-T1: Right facet arthrosis without consequence. Other: Large left pleural effusion.  Right apical pleural-parenchymal scarring.     CT CERVICAL SPINE WITHOUT IV CONTRAST    Result Date: 8/3/2023  IMPRESSION:  No acute intracranial abnormality.  Multiple scalp hematomas.  No evidence for  acute fracture or traumatic subluxation the cervical spine.  Large left pleural effusion. COMMENT: A standard noncontrast head CT was performed. Noncontrast CT examination of the cervical spine performed following the standard protocol. Sagittal and coronal reformations rendered from axial source images. Images reviewed in bone and soft tissue windows. CT DOSE:  One or more dose reduction techniques (e.g. automated exposure control, adjustment of the mA and/or kV according to patient size, use of iterative reconstruction technique) utilized for this examination. Comparison:  No prior studies are available for comparison. Findings:  Sulci, ventricles and basal cisterns are prominent within normal limits for patient's age.  Mild periventricular and subcortical white matter hypoattenuation, nonspecific, likely sequela microangiopathic disease.  No acute hemorrhage, acute territorial infarct, or mass effect is seen.  There is no extra-axial fluid collection.  The visualized paranasal sinuses are clear. Mastoid air cells are clear.  Moderate-sized left frontal scalp hematoma.  Small right frontal scalp hematoma. Cervicothoracic alignment: Anatomic. Prevertebral soft tissues: Normal in thickness. Vertebral bodies: Normal in height.  No acute fractures.  Fusion of the posterior elements of C4 on C5. Intervertebral discs: Disc osteophyte complex throughout the cervical spine. Cervical and upper thoracic spinal canal: Patent. Axial images: Skull base: Normal. C1-2: Normal. C2-3: Facet arthrosis, right uncovertebral hypertrophic changes, contributing to mild right foraminal narrowing. C3-4: Disc osteophyte complex with severe right greater than left uncovertebral hypertrophic changes, severe left facet hypertrophy, contributing to moderate bilateral foraminal narrowing. C4-5: Severe left and moderate right facet hypertrophy, contributing to moderate to moderate severe left foraminal narrowing. C5-6: Small disc osteophyte  complex with facet arthrosis and left uncovertebral hypertrophic changes, contributing to mild left foraminal narrowing. C6-7: Mild posterior osseous spurring without consequence. C7-T1: Right facet arthrosis without consequence. Other: Large left pleural effusion.  Right apical pleural-parenchymal scarring.       ECG/Telemetry  I have independently reviewed the telemetry. No events for the last 24 hours.    ASSESSMENT AND PLAN      Pleural effusion, bilateral  Assessment & Plan  Large left and small right pleural effusion on CT chest  Suspect etiology most likely acute CHF due to right-sided heart failure however asymmetric nature raises concern for other etiology (ie, malignancy, paramedic)    - Obtained Pleural studies. Only 60 mL clear yellow pleural fluid d/t patient going hypotensive during procedure  - Fluid protein- 4.8 and Serum protein 8.3. Ratio= .58 .  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 Jeffrey at [facility]  Home regimen: Metoprolol 25 mg daily, Entresto, Toremide 40 mg every other day  Dry weight 166 (reported). Weight at 167  Physical exam shows crackles, , CT chest shows large left and small right pleural effusions    CHF exacerbation likely in the s/o increased fluid intake vs dietary indiscretion vs component of progression of known RV dysfunction due to pulmonary HTN; otherwise low suspicion for ACS as trop plateau'd and ECG without acute ischemic change    S/p 40 mg IV Lasix in the ED      - Hold diuresis with IV lasix, re-dose based on Cr response and UOP  - Order Echo to evaluate heart function  - Hold GDMT given soft BP and decompensated CHF, resume as able  - Cards c/s, preferably CHF/pHTN team  - CHF orderset:  strict I&Os, daily standing wt, replete lytes for goal Mg>2 and K>4, cardiac diet with fluid restriction    Elevated troponin  Assessment & Plan  Minimally elevated on admission, likely demand in the setting of acute CHF  No chest pain and ECG without acute ST-T wave abnormalities    - Repeat ECG and troponin PRN for chest pain    Atrial fibrillation (CMS/[facility])  Assessment & Plan  History of chronic atrial fibrillation on metoprolol succinate 25 mg daily  Previously on Eliquis however does not appear to have tolerated due to diverticular bleeding; also may not be a good candidate due to falls  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    - Resume metoprolol as BP allows pending improvement in decompensated CHF  - Monitor on telemetry, repeat ECG as needed    CALI (acute kidney injury) (CMS/[facility])  Assessment & Plan  Cr 1.8 on presentation, last 1.6 in 4/2023 with prior baseline ~1.0 from 11/2022  Likely 2/2 cardiorenal syndrome in the setting of acute CHF  S/p 500 cc NSS in ED due to soft BP    - BMP twice daily  - Avoid nephrotoxins, dose meds renally  - Heparin for DVT prophylaxis    Respiratory failure with hypoxia (CMS/[facility])  Assessment & Plan  Hypoxia to 89% on arrival which improved on 2 L NC  In the setting of CT chest revealing large left and small right pleural effusions  No wheezing on exam, has had a productive cough likely cardiac in nature due to volume overload    - Plan as per acute CHF problem and pleural effusion problem  - Wean O2 as able to maintain O2 sat >92% (O2 sat is currently at 95 on 2L NC)    UTI (urinary tract infection)  Assessment & Plan  Patient with recent UTI being treated with 7-day course of ciprofloxacin as an outpatient (initiated 7/29/2023)  Denies urinary complaints    - Complete ciprofloxacin course      Hypertension  Assessment & Plan  Home regimen: Metoprolol 25 mg daily    - Hold BP meds given soft Bps, resume as able    Aneurysm of right internal iliac artery  (CMS/[facility])  Assessment & Plan  CT abdomen/pelvis noted an incidental 3.5 x 3.5 cm internal iliac artery aneurysm that is partially thrombosed  Stable in size from prior CT abdomen/pelvis in 10/2022    - Vascular surgery consulted-no acute surgical intervention, will follow-up as an outpatient  - Continue supportive management with BP control    Coronary artery disease  Assessment & Plan  Hx CABG in 1996, subsequent adenosine MPI negative for ischemia in 6/13  On Plavix  No chest pain, ECG without acute ischemic changes, trop plateau'd    - Continue Plavix    Fall  Assessment & Plan  Fall after standing from bed, likely mechanical in the setting of generalized weakness and hypoxia versus a component of orthostatic hypotension  No associated loss of consciousness or seizure activity  CT head on admission without acute intracranial abnormality    Plan:   - Ortho consult- no intervention needed for hip fracture  - Fall precautions  - PT/OT  - orthostatic vitals         VTE Assessment: Padua    VTE Prophylaxis: Current anticoagulants:    •None      Code Status: Full Code  Estimated discharge date: 8/6/2023     ATTENDING DOCUMENTATION  ALSO SEE ATTENDING ATTESTATION SECTION OF NOTE

## 2023-08-04 NOTE — POST-PROCEDURE NOTE
Interventional Radiology Brief Postprocedure Note    Samy Glass     Attending: SOPHIA Donohue / Constantino Potts MD     Assistant: none    Diagnosis: sob    Description of procedure: US guided L thoracentesis    Contrast: none     Anesthesia:  Local (Lidocaine)    Volume of Lidocaine Utilized (ml): 10     Medications: none     Complications: None      Estimated Blood Loss: Estimated Blood Loss: None    Anticoagulation: n/a    Specimens: 60 mL clear yellow pleural fluid    Findings: Technically successful diagnostic L thoracentesis with removal of only 60 mL clear yellow pleural fluid. BP dropped significantly (68/40) when patient was laid down on his right side therefore therapeutic thora not performed. Patient asymptomatic. Specimen sent to lab. Team updated. CXR pending.     8/4/2023 9:38 AM

## 2023-08-04 NOTE — CONSULTS
Wound Ostomy Continence Note    Subjective    HPI Patient is a 89 y.o. male who was admitted on 8/3/2023 with a diagnosis of Acute on chronic systolic (congestive) heart failure (CMS/HCC) [I50.23].    Problem list Principal Problem:    Acute on chronic systolic (congestive) heart failure (CMS/HCC)  Active Problems:    Fall    Coronary artery disease    Respiratory failure with hypoxia (CMS/HCC)    CALI (acute kidney injury) (CMS/[facility])    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[facility])    Hypertension    Pleural effusion, bilateral    Atrial fibrillation (CMS/HCC)    Elevated troponin    UTI (urinary tract infection)     PMH/PSH No past medical history on file.  No past surgical history on file.   Assessment and Recommendation Consult received for ostomy care, history of Justin's gangrene, wounds reportedly healed.    Ostomy care per nursing staff. Pouch is to be emptied when 1/3 full and changed twice a week and as needed for leaking. No ostomy needs per patients nurse other than needing pouch numbers to obtain supplies. 1 piece pouch 0412 and clamp 13316, 2 piece pouch [id number] and [id number]. Supplies obtained from [employer].     If wounds are present, please contact/ re-consult wound care.   Signing off at this time.            Date: 08/04/23  Signature: Juan Kern RN

## 2023-08-04 NOTE — CONSULTS
"Brief Nutrition Note    Recommendations   1. CHELSI diet  2. Ensure compact BID and boost breeze QD       Clinical Course: Patient is a 89 y.o. male who was admitted on 8/3/2023 with a diagnosis of Acute on chronic systolic (congestive) heart failure (CMS/HCC) [I50.23].     No past medical history on file.  No past surgical history on file.    Reason for Assessment  Reason For Assessment: physician consult    [facility] Nutrition Screen Tool  Has patient lost weight without trying?: 0-->No  Has patient been eating poorly due to decreased appetite?: 0-->No  [facility] Nutrition Screen Score: 0     Physical Findings  Skin: intact     [id number] Physical Appearance  Skin: intact     Nutrition Order  Nutrition Order: meets nutritional requirements  Nutrition Order Comments: cardiac     Anthropometrics  Height: 185.4 cm (6' 1\")     Current Weight  Weight Method: Bed scale  Weight: 76 kg (167 lb 8.8 oz)     Ideal Body Weight (IBW)  Ideal Body Weight (IBW) (kg): 84.86  % Ideal Body Weight: 90.85     Body Mass Index (BMI)  BMI (Calculated): 22.1     Labs/Procedures/Meds  Lab Results Reviewed: reviewed, pertinent     [email or web address] Lab Results  Lab Results Reviewed: reviewed, pertinent   BMP Results       08/04/23 08/03/23 08/03/23     0614 1839 0832     140 142    K 4.0 4.5 4.2    Cl 104 105 107    CO2 27 25 26    Glucose 115 163 115    BUN 40 40 39    Creatinine 1.9 1.8 1.8    Calcium 8.3 8.5 8.9    Anion Gap 10 10 9    EGFR 33.4 35.5 35.5         Comment for K at 0832 on 08/03/23: Results obtained on plasma. Plasma Potassium values may be up to 0.4 mEQ/L less than serum values. The differences may be greater for patients with high platelet or white cell counts.         Lab Results   Component Value Date    HGBA1C 6.4 (H) 08/03/2023      Medications  Pertinent Medications Reviewed: reviewed, pertinent   Scheduled Meds:  • clopidogreL  75 mg oral Daily   • escitalopram  5 mg oral Daily   • levothyroxine  137 mcg oral Daily (6:30a)   • [Provider " Managed Hold] metoprolol succinate XL  25 mg oral Daily   • [Provider Managed Hold] sacubitriL-valsartan  2 tablet oral BID   • [Provider Managed Hold] torsemide  20 mg oral BID     Clinical comments: CHF diet education consult received. Pt reports overall good appetite, but decreased for 3-4 days, now improved. Wt fluctuates 3-4# at baseline. NKFA. No diet restrictions at baseline. Drinks supplements occasionally, would like to receive while here. Diet education not appropriate.    Goals: meet > 75% needs via PO intakes  Monitor: PO intakes, skin, labs, plan of care    Recommendations: See above       Date: 08/04/23  Signature: BRANDY Choe

## 2023-08-04 NOTE — PROGRESS NOTES
CARDIOLOGY FOLLOW UP NOTE    Subjective     Reason for consult  Concern for RV Failure    History of Present Illness  Samy Glass is a 89 y.o. year old male with a past medical history of ischemic cardiomyopathy EF most recently of 40% s/p CABG in 1996, biventricular heart failure, chronic atrial fibrillation, dyslipidemia, history of Justin's gangrene, hypertension, hx of GI bleed.    Pt seen and examined this morning.  Noted to be hypotensive at IR procedure.  P states that he feels well and continues to be asymptomatic.  No further diuretics given.      Review of Systems  A complete 14-point review of systems is negative, except as noted in the HPI.    Past History  No past medical history on file.  No past surgical history on file.  No family history on file.  Social History     Tobacco Use   • Smoking status: Not on file   • Smokeless tobacco: Not on file   Substance Use Topics   • Alcohol use: Not on file       Allergies  No Known Allergies    Home Medications  Current Outpatient Medications   Medication Instructions   • ascorbic acid (VITAMIN C) 500 mg, oral, Daily   • calcium carbonate (TUMS) 200 mg calcium (500 mg) chewable tablet 1 tablet, oral, 2 times daily   • ciprofloxacin (CIPRO) 250 mg, oral, 2 times daily   • clopidogreL (PLAVIX) 75 mg, oral, Daily   • cyanocobalamin (VITAMIN B12) 500 mcg, oral, Daily   • escitalopram (LEXAPRO) 5 mg, oral, Daily   • levothyroxine (SYNTHROID) 137 mcg, oral, Daily (6:30a)   • magnesium oxide (MAG-OX) 400 mg, oral, 2 times daily   • melatonin 3 mg, oral, Nightly   • metoprolol succinate XL (TOPROL-XL) 25 mg, oral, Daily   • multivitamin tablet 1 tablet, oral, Daily   • omeprazole OTC (PRILOSEC OTC) 20 mg, oral, Daily before breakfast   • potassium chloride (KLOR-CON) 10 mEq CR tablet 20 mEq, oral, 2 times daily   • sacubitriL-valsartan (ENTRESTO) 24-26 mg per tablet 1 tablet, oral, 2 times daily   • thiamine 100 mg, oral, Daily   • torsemide (DEMADEX) 40 mg,  "oral, Every other day        Hospital Medications  • clopidogreL  75 mg oral Daily   • escitalopram  5 mg oral Daily   • levothyroxine  137 mcg oral Daily (6:30a)   • [Provider Managed Hold] metoprolol succinate XL  25 mg oral Daily   • [Provider Managed Hold] sacubitriL-valsartan  2 tablet oral BID   • [Provider Managed Hold] torsemide  20 mg oral BID       Objective     Physical Exam  Vitals:    08/04/23 1120   BP: (!) 87/59   Pulse: 82   Resp: 18   Temp: 36.3 °C (97.4 °F)   SpO2: 99%      Ht Readings from Last 1 Encounters:   08/03/23 1.854 m (6' 1\")      Wt Readings from Last 3 Encounters:   08/04/23 76 kg (167 lb 8.8 oz)    Body mass index is 22.11 kg/m².    Intake/Output Summary (Last 24 hours) at 8/4/2023 1403  Last data filed at 8/3/2023 1444  Gross per 24 hour   Intake --   Output 400 ml   Net -400 ml       Physical Exam  Constitutional:       General: He is not in acute distress.     Comments: Appropriate and conversant   HENT:      Head:      Comments: Pt with bruising scattered along his forehead     Right Ear: External ear normal.      Left Ear: External ear normal.      Mouth/Throat:      Mouth: Mucous membranes are moist.   Eyes:      Pupils: Pupils are equal, round, and reactive to light.   Neck:      Comments: JVP persistently above the level of the clavicle  Cardiovascular:      Rate and Rhythm: Normal rate. Rhythm irregular.      Pulses: Normal pulses.   Pulmonary:      Effort: Pulmonary effort is normal.      Breath sounds: No wheezing.      Comments: Decreased over the left chest wall  Abdominal:      General: Abdomen is flat. There is no distension.   Musculoskeletal:      Right lower leg: No edema.      Left lower leg: No edema.   Skin:     General: Skin is warm.      Capillary Refill: Capillary refill takes less than 2 seconds.      Coloration: Skin is pale.   Neurological:      General: No focal deficit present.      Mental Status: He is alert and oriented to person, place, and time. "   Psychiatric:         Mood and Affect: Mood normal.         Behavior: Behavior normal.       Relevant data reviewed:    ECG: Atrial fibrillation   Right bundle branch block   Abnormal ECG   No previous ECGs available   Confirmed by Nubia Rincon ([id number]) on 8/3/2023 5:08:51 PM    Telemetry: atrial fibrillation seen    Labs  Lab Results   Component Value Date    WBC 5.23 08/04/2023    HGB 11.5 (L) 08/04/2023     (L) 08/04/2023    CHOL 105 08/04/2023    TRIG 89 08/04/2023    HDL 27 (L) 08/04/2023    LDLCALC 60 08/04/2023    ALT 11 08/03/2023    AST 36 08/03/2023     08/04/2023    K 4.0 08/04/2023    CREATININE 1.9 (H) 08/04/2023    TSH 0.09 (L) 08/03/2023    INR 1.4 08/03/2023    HGBA1C 6.4 (H) 08/03/2023         Vascular Studies:  No results found for this or any previous visit from the past 730 days.    No results found for this or any previous visit from the past 730 days.    No results found for this or any previous visit from the past 730 days.    No results found for this or any previous visit from the past 730 days.      Peripheral:  No results found for this or any previous visit from the past 730 days.      Assessment & Plan     1. Acute on Chronic Biventricular Heart Failure  Patient presenting with some clinical information that appears to be volume overloaded with evidence of IVC reflux seen on CT scan modestly elevated BNP as well as bilateral pleural effusions.  Although with regards to IVC reflux need to consider that patient has severe TR.  On examination at this time patient appears euvolemic with some slight elevation to elevation.     -Agree with holding GDMT at this time with Entresto and will add back metoprolol as blood pressure normalizes.  Suspect that there is some difficulty in balancing patient's volume management as well as his blood pressure given medication changes as an outpatient.  -Holding further diuresis today ain setting of hypotension; repeat Echocardiogram ordered and  pending.  -Continue to obtain standing daily weights if possible.  Patient notes that his dry weight seems to be around 165 pounds.    2. CAD s/p CABG  Patient with history of CAD status post CABG in 1996.  Maintained on Plavix.  He is on statin as an outpatient and can continue while inpatient    3. Fall  It does not appear the patient's cardiac history has played a significant role in this patient's fall as he relays no prodromal symptoms and states that his legs just gave out.  In a 89-year-old gentleman our concern would be some level of orthostasis with his blood pressure running low as well as with difficulties in managing his fluid status.  -Would ensure that orthostatic vital signs are obtained  -Agree with holding blood pressure medications for now to volume status to equal great and will reassess starting diuretic tomorrow for maintenance.  -Would consider PMR consultation to evaluate other possible etiology of the patient's fall    4. Atrial Fibrillation  History of chronic atrial fibrillation maintained on beta-blocker.  Beta-blocker currently on hold in the setting of hypotension.  Patient not on anticoagulation at this time given GI bleeding.  We will continue to follow-up with regards to appropriateness of Watchman procedure as an outpatient.  Aorta.  This conversation has already taken place as an outpatient.      Recommendations not finalized until staffed with attending physician.  Please await attending attestation for final recommendations.      Eric Gilbert, DO  PGY4 Cardiology  [id number]

## 2023-08-04 NOTE — OR SURGEON
Pre-Procedure patient identification:  I am the primary operating surgeon/proceduralist and I have reviewed the applicable pathology reports and radiology studies for this procedure. I have identified the patient and confirmed laterality is left on 08/04/23 at 9:19 AM SOPHIA Donohue

## 2023-08-04 NOTE — PLAN OF CARE
Plan of Care Review  Plan of Care Reviewed With: patient  Progress: improving  Outcome Evaluation: Patient Aox4. Denies chest pain. Denies SOB. Patient for thoracentesis. BP low during procedure. Thoracentesis not performed. Patient returned to floor. SBP 80s. Patient asymptomatic of BP. Incontinent of urine. Will continue to monitor patient.

## 2023-08-05 LAB
ANION GAP SERPL CALC-SCNC: 14 MEQ/L (ref 3–15)
ATRIAL RATE: 64
BUN SERPL-MCNC: 45 MG/DL (ref 7–25)
CALCIUM SERPL-MCNC: 8.1 MG/DL (ref 8.6–10.3)
CHLORIDE SERPL-SCNC: 103 MEQ/L (ref 98–107)
CO2 SERPL-SCNC: 20 MEQ/L (ref 21–31)
CREAT SERPL-MCNC: 1.9 MG/DL (ref 0.7–1.3)
ERYTHROCYTE [DISTWIDTH] IN BLOOD BY AUTOMATED COUNT: 16.5 % (ref 11.6–14.4)
FUNGUS STAIN: NORMAL
GFR SERPL CREATININE-BSD FRML MDRD: 33.4 ML/MIN/1.73M*2
GLUCOSE SERPL-MCNC: 106 MG/DL (ref 70–99)
HCT VFR BLDCO AUTO: 33.6 % (ref 40.1–51)
HGB BLD-MCNC: 10.9 G/DL (ref 13.7–17.5)
MAGNESIUM SERPL-MCNC: 2 MG/DL (ref 1.8–2.5)
MCH RBC QN AUTO: 32.3 PG (ref 28–33.2)
MCHC RBC AUTO-ENTMCNC: 32.4 G/DL (ref 32.2–36.5)
MCV RBC AUTO: 99.7 FL (ref 83–98)
PDW BLD AUTO: 11.5 FL (ref 9.4–12.4)
PLATELET # BLD AUTO: 112 K/UL (ref 150–350)
POTASSIUM SERPL-SCNC: 4 MEQ/L (ref 3.5–5.1)
QRS DURATION: 184
QT INTERVAL: 464
QTC CALCULATION(BAZETT): 518
R AXIS: 80
RBC # BLD AUTO: 3.37 M/UL (ref 4.5–5.8)
RHODAMINE-AURAMINE STN SPEC: NORMAL
SODIUM SERPL-SCNC: 137 MEQ/L (ref 136–145)
T WAVE AXIS: 69
VENTRICULAR RATE: 75
WBC # BLD AUTO: 5.78 K/UL (ref 3.8–10.5)

## 2023-08-05 PROCEDURE — 80048 BASIC METABOLIC PNL TOTAL CA: CPT

## 2023-08-05 PROCEDURE — 85027 COMPLETE CBC AUTOMATED: CPT

## 2023-08-05 PROCEDURE — 93010 ELECTROCARDIOGRAM REPORT: CPT | Performed by: INTERNAL MEDICINE

## 2023-08-05 PROCEDURE — 99233 SBSQ HOSP IP/OBS HIGH 50: CPT | Performed by: HOSPITALIST

## 2023-08-05 PROCEDURE — 36415 COLL VENOUS BLD VENIPUNCTURE: CPT

## 2023-08-05 PROCEDURE — 99232 SBSQ HOSP IP/OBS MODERATE 35: CPT | Performed by: STUDENT IN AN ORGANIZED HEALTH CARE EDUCATION/TRAINING PROGRAM

## 2023-08-05 PROCEDURE — 63700000 HC SELF-ADMINISTRABLE DRUG

## 2023-08-05 PROCEDURE — 21400000 HC ROOM AND CARE CCU/INTERMEDIATE

## 2023-08-05 PROCEDURE — 83735 ASSAY OF MAGNESIUM: CPT

## 2023-08-05 PROCEDURE — 93005 ELECTROCARDIOGRAM TRACING: CPT | Performed by: HOSPITALIST

## 2023-08-05 RX ADMIN — ESCITALOPRAM OXALATE 5 MG: 5 TABLET, FILM COATED ORAL at 08:57

## 2023-08-05 RX ADMIN — CLOPIDOGREL BISULFATE 75 MG: 75 TABLET ORAL at 08:57

## 2023-08-05 RX ADMIN — LEVOTHYROXINE SODIUM 137 MCG: 0.11 TABLET ORAL at 05:24

## 2023-08-05 NOTE — PROGRESS NOTES
CARDIOLOGY FOLLOW UP NOTE    Subjective     Reason for consult  Concern for RV Failure    History of Present Illness  Samy Glass is a 89 y.o. year old male with a past medical history of ischemic cardiomyopathy EF most recently of 40% s/p CABG in 1996, biventricular heart failure, chronic atrial fibrillation, dyslipidemia, history of Justin's gangrene, hypertension, hx of GI bleed who presents with an episode of weakness. Has been hypotensive.     I/O not accurate. Weight from bed scale 167, unchanged.  His blood pressure continues to be low - 80s-100 systolic. Remains on 2 L NC. Cr stable.     He states that he feels well. He denies symptoms of LH with the lower blood pressures. He feels that his breathing is at baseline. He wants to get out of bed and walk.     Review of Systems  A complete 14-point review of systems is negative, except as noted in the HPI.    Past History  No past medical history on file.  No past surgical history on file.  No family history on file.  Social History     Tobacco Use   • Smoking status: Not on file   • Smokeless tobacco: Not on file   Substance Use Topics   • Alcohol use: Not on file       Allergies  No Known Allergies    Home Medications  Current Outpatient Medications   Medication Instructions   • ascorbic acid (VITAMIN C) 500 mg, oral, Daily   • calcium carbonate (TUMS) 200 mg calcium (500 mg) chewable tablet 1 tablet, oral, 2 times daily   • ciprofloxacin (CIPRO) 250 mg, oral, 2 times daily   • clopidogreL (PLAVIX) 75 mg, oral, Daily   • cyanocobalamin (VITAMIN B12) 500 mcg, oral, Daily   • escitalopram (LEXAPRO) 5 mg, oral, Daily   • levothyroxine (SYNTHROID) 137 mcg, oral, Daily (6:30a)   • magnesium oxide (MAG-OX) 400 mg, oral, 2 times daily   • melatonin 3 mg, oral, Nightly   • metoprolol succinate XL (TOPROL-XL) 25 mg, oral, Daily   • multivitamin tablet 1 tablet, oral, Daily   • omeprazole OTC (PRILOSEC OTC) 20 mg, oral, Daily before breakfast   • potassium  "chloride (KLOR-CON) 10 mEq CR tablet 20 mEq, oral, 2 times daily   • sacubitriL-valsartan (ENTRESTO) 24-26 mg per tablet 1 tablet, oral, 2 times daily   • thiamine 100 mg, oral, Daily   • torsemide (DEMADEX) 40 mg, oral, Every other day        Hospital Medications  • clopidogreL  75 mg oral Daily   • escitalopram  5 mg oral Daily   • levothyroxine  137 mcg oral Daily (6:30a)   • [Provider Managed Hold] metoprolol succinate XL  25 mg oral Daily   • [Provider Managed Hold] sacubitriL-valsartan  2 tablet oral BID   • [Provider Managed Hold] torsemide  20 mg oral BID       Objective     Physical Exam  Vitals:    08/05/23 0822   BP: (!) 101/56   Pulse: 86   Resp: 18   Temp: 36.6 °C (97.8 °F)   SpO2: 93%      Ht Readings from Last 1 Encounters:   08/04/23 1.854 m (6' 1\")      Wt Readings from Last 3 Encounters:   08/05/23 76.2 kg (167 lb 15.9 oz)    Body mass index is 22.16 kg/m².    Intake/Output Summary (Last 24 hours) at 8/5/2023 0906  Last data filed at 8/5/2023 0113  Gross per 24 hour   Intake 120 ml   Output 0 ml   Net 120 ml       Physical Exam  Constitutional:       General: He is not in acute distress.     Comments: Appropriate and conversant   HENT:      Head:      Comments: Pt with bruising scattered along his forehead     Right Ear: External ear normal.      Left Ear: External ear normal.      Mouth/Throat:      Mouth: Mucous membranes are moist.   Eyes:      Pupils: Pupils are equal, round, and reactive to light.   Neck:      Comments: JVP persistently above the level of the clavicle  Cardiovascular:      Rate and Rhythm: Normal rate. Rhythm irregular.      Pulses: Normal pulses.   Pulmonary:      Effort: Pulmonary effort is normal.      Breath sounds: No wheezing.      Comments: Decreased over the left chest wall  Abdominal:      General: Abdomen is flat. There is no distension.   Musculoskeletal:      Right lower leg: No edema.      Left lower leg: No edema.   Skin:     General: Skin is warm.      Capillary " Refill: Capillary refill takes less than 2 seconds.      Coloration: Skin is pale.   Neurological:      General: No focal deficit present.      Mental Status: He is alert and oriented to person, place, and time.   Psychiatric:         Mood and Affect: Mood normal.         Behavior: Behavior normal.       Relevant data reviewed:    ECG: Atrial fibrillation   Right bundle branch block   Abnormal ECG   No previous ECGs available   Confirmed by Nubia Rincon ([id number]) on 8/3/2023 5:08:51 PM    Telemetry: atrial fibrillation seen    Labs  Lab Results   Component Value Date    WBC 5.78 08/05/2023    HGB 10.9 (L) 08/05/2023     (L) 08/05/2023    CHOL 105 08/04/2023    TRIG 89 08/04/2023    HDL 27 (L) 08/04/2023    LDLCALC 60 08/04/2023    ALT 10 08/04/2023    AST 35 08/04/2023     08/05/2023    K 4.0 08/05/2023    CREATININE 1.9 (H) 08/05/2023    TSH 0.09 (L) 08/03/2023    INR 1.4 08/03/2023    HGBA1C 6.4 (H) 08/03/2023         Assessment & Plan     1. Acute on Chronic Biventricular Heart Failure  Patient presenting with some clinical information that appears to be volume overloaded with modestly elevated BNP as well as bilateral pleural effusions. On examination at this time patient appears euvolemic    TTE unchanged.     -Agree with holding GDMT at this time with Entresto and will add back metoprolol as blood pressure normalizes.  Suspect that there is some difficulty in balancing patient's volume management as well as his blood pressure given medication changes as an outpatient.  -Would again hold diuresis today ain setting of hypotension  -Continue to obtain standing daily weights if possible.  Patient notes that his dry weight seems to be around 165 pounds.  - Please ambulate patient as much as possible as staying in bed will further exacerbate his weakness and hypotension.    2. CAD s/p CABG  Patient with history of CAD status post CABG in 1996.  Maintained on Plavix.  He is on statin as an outpatient and can  continue while inpatient    3. Fall  It does not appear the patient's cardiac history has played a significant role in this patient's fall as he relays no prodromal symptoms and states that his legs just gave out.  In a 89-year-old gentleman our concern would be some level of orthostasis with his blood pressure running low as well as with difficulties in managing his fluid status.  -Would ensure that orthostatic vital signs are obtained  -Agree with holding blood pressure medications for now to volume status to equal great and will reassess starting diuretic tomorrow for maintenance.  -Would consider PMR consultation to evaluate other possible etiology of the patient's fall    4. Atrial Fibrillation  History of chronic atrial fibrillation maintained on beta-blocker.  Beta-blocker currently on hold in the setting of hypotension.  Patient not on anticoagulation at this time given GI bleeding.  We will continue to follow-up with regards to appropriateness of Watchman procedure as an outpatient.  Aorta.  This conversation has already taken place as an outpatient.      Rafy Sharma MD

## 2023-08-05 NOTE — PROGRESS NOTES
Internal Medicine  Daily Progress Note       SUBJECTIVE   This is a 89 y.o. year-old male admitted on 8/3/2023 with Acute on chronic systolic (congestive) heart failure (CMS/HCC) [I50.23].    Interval History:     IR thoracocentesis 8/4 attempted, however during procedure, however patient's SBP dropped to 60s hence only 70 cc was retrieved for diagnostic purporses. Light's criteria reveal exudative pattern. TTE performed yesterday which revealed an EF 45-50% with severely dilated and decreased systolic function of the RV. Overall the patient is comfortable appearing and denies subjective complaints.       OBJECTIVE   Vital signs in last 24 hours:  Temp:  [36.3 °C (97.4 °F)-36.8 °C (98.3 °F)] 36.6 °C (97.8 °F)  Heart Rate:  [74-89] 86  Resp:  [18-20] 18  BP: ()/(51-60) 101/56  SpO2:  [93 %-99 %] 93 %  Oxygen Therapy: Supplemental oxygen  O2 Delivery Method: Nasal cannula  O2 Flow Rate (L/min):  [2 L/min] 2 L/min    Weight & I/Os:  Weights (last 5 days)     Date/Time Weight    08/05/23 0533 76.2 kg (167 lb 15.9 oz)    08/04/23 1611 75.8 kg (167 lb)    08/04/23 0600 76 kg (167 lb 8.8 oz)    08/03/23 1655 77.1 kg (169 lb 15.6 oz)          Intake/Output Summary (Last 24 hours) at 8/5/2023 0659  Last data filed at 8/5/2023 0113  24 Hour Net Input/Output from 7AM Yesterday   Intake 120 ml   Output 0 ml   Net 120 ml        PHYSICAL EXAMINATION   Constitutional:       Appearance: Normal appearance. He is normal weight.   Cardiovascular:      Rate and Rhythm: Normal rate and regular rhythm.      Pulses: Normal pulses.   Pulmonary:      Breath sounds: Decreased breath sounds on L side     Comments: On O2 NC  Abdominal:      General: Abdomen is flat.   Musculoskeletal:   - No presence of edema  Skin:     General: Skin is warm and dry.      Findings: Bruising and laceration present.      Comments: Hematoma on Left forehead  Neurological:      Mental Status: He is aaoX3   Psychiatric:         Mood and Affect:  Mood normal.         Behavior: Behavior normal.         Thought Content: Thought content normal.         Judgment: Judgment normal    LINES, CATHETERS, DRAINS, AIRWAYS, & WOUNDS   Lines, drains, airways, & wounds:  Peripheral IV (Adult) 08/03/23 Anterior;Left Forearm (Active)   Number of days: 2       Colostomy LLQ (Active)   Number of days: 2       External Urinary Catheter (Active)   Number of days: 2        LABS, IMAGING, & TELE   Labs:      Results from last 7 days   Lab Units 08/05/23  0548 08/04/23  0614 08/03/23  0832   WBC K/uL 5.78 5.23 5.21   HEMOGLOBIN g/dL 10.9* 11.5* 11.5*   HEMATOCRIT % 33.6* 36.2* 36.3*   PLATELETS K/uL 112* 125* 145*       Results from last 7 days   Lab Units 08/05/23  0548 08/04/23  1851 08/04/23  0614 08/03/23  1839 08/03/23  0832   SODIUM mEQ/L 137 137 141   < > 142   POTASSIUM mEQ/L 4.0 4.1 4.0   < > 4.2   CHLORIDE mEQ/L 103 102 104   < > 107   CO2 mEQ/L 20* 23 27   < > 26   BUN mg/dL 45* 43* 40*   < > 39*   CREATININE mg/dL 1.9* 2.0* 1.9*   < > 1.8*   CALCIUM mg/dL 8.1* 8.1* 8.3*   < > 8.9   ALBUMIN g/dL  --   --  3.4*  --  3.5   BILIRUBIN TOTAL mg/dL  --   --  1.4*  --  0.8   ALK PHOS IU/L  --   --  97  --  103   ALT IU/L  --   --  10  --  11   AST IU/L  --   --  35  --  36   GLUCOSE mg/dL 106* 158* 115*   < > 115*    < > = values in this interval not displayed.     Imaging personally reviewed (does not include unread studies):  Transthoracic Echo (TTE) Complete    Result Date: 8/4/2023  Technically difficult study. Patient in atrial fibrillation with rates 75-85 bpm at time of examination. The left ventricular cavity size is small due to compression from RV with normal wall thickness and mildly reduced systolic function. Estimated EF 45 to 50 %. D shaped left ventricular cavity in the setting of severe RVoverload Unable to assess diastolic function due to atrial arrhythmia. The aortic valve is tricuspid. Aortic valve and root sclerosis. Mild aortic regurgitation. The  visualized portion of the aortic root is normal in size. The mitral valve is thickened. Mild mitral annular calcification. Mild mitral regurgitation. The left atrium is mildly dilated The right ventricular cavity is severely dilated with severely decreased systolic function. Nunes's sign is present. The pulmonic valve is not well seen. Severely dilated tricuspid valve annulus with tricuspid leaflets displaying a large coaptation gap; approximately 1 cm Severe tricuspid regurgitation with an estimated RVSP of at least 35 mmHg, but likely likely much higher in the setting of severe TR.  Diastolic flow reversal in the hepatic veins. The right atrium is severely dilated. The IVC is massively dilated and collapses < 50% with inspiration consistent with severely elevated right atrial pressures. Large pleural effusion; cannot exclude a pericardial component.  No obvious chamber collapse. Compared to previous echocardiogram from 8/4/22, no significant changes. Results communicated to primary team.     X-RAY CHEST 1 VIEW    Result Date: 8/4/2023  CLINICAL HISTORY: s/p L thora, diagnostic only at 1015 COMPARISON: CT chest from the prior day COMMENT: TECHNIQUE: Single frontal view of the chest was performed. LINES/TUBES/HARDWARE: Monitoring leads on the chest. There are sternal wires and mediastinal clips typical of prior CABG. Many of the sternal wires are fractured. LUNGS AND PLEURA: See impression CARDIOMEDIASTINUM: The cardiomediastinal silhouette is stable noting cardiomegaly. SKELETON/OTHER: Degenerative changes of the spine and shoulders.     IMPRESSION: No appreciable pneumothorax seen following left thoracentesis. Residual moderately large left pleural effusion and small right pleural effusion with underlying consolidative opacity.     IR THORACENTESIS    Result Date: 8/4/2023  CLINICAL HISTORY: shortness of breath. COMMENT: The patient was referred for ultrasound-guided thoracentesis on the left side.  Informed  consent was obtained.  With the patient sitting, ultrasound imaging was performed and a large size pleural effusion was identified.  The fluid was localized and a site was selected on the skin with ultrasound.  The site was marked.  Using sterile technique, under local anesthesia, a 4-Tongan valved Yueh catheter was inserted into the pleural space. 60 mL of clear yellow pleural fluid were removed.  The catheter was removed and a sterile dressing was placed over the catheter insertion site. A large pleural effusion remained post procedure.  This procedure was discontinued prior to complete evacuation of the effusion because the patient's blood pressure drop significantly therefore only enough fluid was removed for diagnostic purposes.  The fluid was sent to the lab.  The patient tolerated the procedure well, he was asymptomatic and his blood pressure improved upon sitting up. An erect frontal chest film was obtained immediately following the procedure and will be separately reported. This service rendered by Avril Nava PA-C     IMPRESSION: Successful ultrasound-guided left-sided thoracentesis with 60 mL pleural fluid removed and sent to the lab.  All components of the time-out, debriefing, and handling of the specimen were conducted as per the ASAP Specimen Protocol. I certify that I have personally reviewed this examination and agree with Avril Nava's report. Constantino Potts MD    Telemetry/EKG:       ASSESSMENT & PLAN   UTI (urinary tract infection)  Assessment & Plan  Patient with recent UTI being treated with 7-day course of ciprofloxacin as an outpatient (initiated 7/29/2023)  Denies urinary complaints    - Complete ciprofloxacin course      Elevated troponin  Assessment & Plan  Minimally elevated on admission, likely demand in the setting of acute CHF  No chest pain and ECG without acute ST-T wave abnormalities    - Repeat ECG and troponin PRN for chest pain    Atrial fibrillation (CMS/[facility])  Assessment &  Plan  History of chronic atrial fibrillation on metoprolol succinate 25 mg daily  Previously on Eliquis however does not appear to have tolerated due to diverticular bleeding; also may not be a good candidate due to falls  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 Only 60 mL clear yellow pleural fluid d/t patient going hypotensive during procedure  - Fluid protein- 4.8 and Serum protein 8.3. Ratio= .58 .  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)  Assessment & Plan  CT abdomen/pelvis noted an incidental 3.5 x 3.5 cm internal iliac artery aneurysm that is partially thrombosed  Stable in size from prior CT abdomen/pelvis in 10/2022    - Vascular surgery consulted-no acute surgical intervention, will follow-up as an outpatient  - Continue supportive management with BP control    CALI (acute kidney injury) (CMS/HCC)  Assessment & Plan  Cr 1.8 on presentation, last 1.6 in 4/2023 with prior baseline ~1.0 from 11/2022  Likely 2/2 cardiorenal syndrome in the setting of acute CHF  S/p 500 cc NSS in ED due to soft BP    - BMP twice daily  - Avoid nephrotoxins, dose meds renally  - Heparin for DVT prophylaxis    Respiratory failure with hypoxia (CMS/HCC)  Assessment & Plan  Hypoxia to 89% on arrival which improved on 2 L NC  In the setting of CT chest revealing large left and small right pleural effusions  No wheezing on exam, has had a  productive cough likely cardiac in nature due to volume overload    - Plan as per acute CHF problem and pleural effusion problem  - Wean O2 as able to maintain O2 sat >92% (O2 sat is currently at 95 on 2L NC)    Coronary artery disease  Assessment & Plan  Hx CABG in 1996, subsequent adenosine MPI negative for ischemia in 6/13  On Plavix  No chest pain, ECG without acute ischemic changes, trop plateau'd    - Continue Plavix    Fall  Assessment & Plan  Fall after standing from bed, likely mechanical in the setting of generalized weakness and hypoxia versus a component of orthostatic hypotension  No associated loss of consciousness or seizure activity  CT head on admission without acute intracranial abnormality    Plan:   - Ortho consult- no intervention needed for hip fracture  - Fall precautions  - PT/OT  - orthostatic vitals    * Acute on chronic systolic (congestive) heart failure (CMS/HCC)  Assessment & Plan  Pt with a hx of HFmrEF (EF 40-45% on TTE 8/2022 with severely dilated RV with severely reduced RV systolic function)  Hx of pulmonary HTN previously on sildenafil  Follows with Cardiology Dr. Jeffrey at [facility]  Home regimen: Metoprolol 25 mg daily, Entresto, Toremide 40 mg every other day  Dry weight 166 (reported). Weight at 167  Physical exam shows crackles, , CT chest shows large left and small right pleural effusions    CHF exacerbation likely in the s/o increased fluid intake vs dietary indiscretion vs component of progression of known RV dysfunction due to pulmonary HTN; otherwise low suspicion for ACS as trop plateau'd and ECG without acute ischemic change    S/p 40 mg IV Lasix in the ED      - Hold diuresis with IV lasix, re-dose based on Cr response and UOP  - Order Echo to evaluate heart function  - Hold GDMT given soft BP and decompensated CHF, resume as able  - Cards c/s, preferably CHF/pHTN team  - CHF orderset: strict I&Os, daily standing wt, replete lytes for goal Mg>2 and  K>4, cardiac diet with fluid restriction       VTE Assessment: Padua    VTE Prophylaxis: Current anticoagulants:    •None      Code Status: Full Code  Estimated discharge date: 8/6/2023       ATTENDING DOCUMENTATION  ALSO SEE ATTENDING ATTESTATION SECTION OF NOTE

## 2023-08-06 LAB
ANION GAP SERPL CALC-SCNC: 8 MEQ/L (ref 3–15)
ATRIAL RATE: 84
BUN SERPL-MCNC: 45 MG/DL (ref 7–25)
CALCIUM SERPL-MCNC: 7.9 MG/DL (ref 8.6–10.3)
CHLORIDE SERPL-SCNC: 104 MEQ/L (ref 98–107)
CO2 SERPL-SCNC: 25 MEQ/L (ref 21–31)
CREAT SERPL-MCNC: 1.7 MG/DL (ref 0.7–1.3)
ERYTHROCYTE [DISTWIDTH] IN BLOOD BY AUTOMATED COUNT: 16.3 % (ref 11.6–14.4)
GFR SERPL CREATININE-BSD FRML MDRD: 38 ML/MIN/1.73M*2
GLUCOSE SERPL-MCNC: 123 MG/DL (ref 70–99)
HCT VFR BLDCO AUTO: 32.6 % (ref 40.1–51)
HGB BLD-MCNC: 10.7 G/DL (ref 13.7–17.5)
MAGNESIUM SERPL-MCNC: 1.9 MG/DL (ref 1.8–2.5)
MCH RBC QN AUTO: 33.6 PG (ref 28–33.2)
MCHC RBC AUTO-ENTMCNC: 32.8 G/DL (ref 32.2–36.5)
MCV RBC AUTO: 102.5 FL (ref 83–98)
PDW BLD AUTO: 11.5 FL (ref 9.4–12.4)
PLATELET # BLD AUTO: 111 K/UL (ref 150–350)
POTASSIUM SERPL-SCNC: 3.9 MEQ/L (ref 3.5–5.1)
QRS DURATION: 156
QT INTERVAL: 434
QTC CALCULATION(BAZETT): 497
R AXIS: 69
RBC # BLD AUTO: 3.18 M/UL (ref 4.5–5.8)
SODIUM SERPL-SCNC: 137 MEQ/L (ref 136–145)
T WAVE AXIS: -2
VENTRICULAR RATE: 79
WBC # BLD AUTO: 5.84 K/UL (ref 3.8–10.5)

## 2023-08-06 PROCEDURE — 97530 THERAPEUTIC ACTIVITIES: CPT | Mod: GO

## 2023-08-06 PROCEDURE — 36415 COLL VENOUS BLD VENIPUNCTURE: CPT

## 2023-08-06 PROCEDURE — 97166 OT EVAL MOD COMPLEX 45 MIN: CPT | Mod: GO

## 2023-08-06 PROCEDURE — 99232 SBSQ HOSP IP/OBS MODERATE 35: CPT | Performed by: HOSPITALIST

## 2023-08-06 PROCEDURE — 63700000 HC SELF-ADMINISTRABLE DRUG

## 2023-08-06 PROCEDURE — 97530 THERAPEUTIC ACTIVITIES: CPT | Mod: GP

## 2023-08-06 PROCEDURE — 93010 ELECTROCARDIOGRAM REPORT: CPT | Performed by: INTERNAL MEDICINE

## 2023-08-06 PROCEDURE — 99232 SBSQ HOSP IP/OBS MODERATE 35: CPT | Performed by: STUDENT IN AN ORGANIZED HEALTH CARE EDUCATION/TRAINING PROGRAM

## 2023-08-06 PROCEDURE — 21400000 HC ROOM AND CARE CCU/INTERMEDIATE

## 2023-08-06 PROCEDURE — 97162 PT EVAL MOD COMPLEX 30 MIN: CPT | Mod: GP

## 2023-08-06 PROCEDURE — 93005 ELECTROCARDIOGRAM TRACING: CPT | Performed by: HOSPITALIST

## 2023-08-06 PROCEDURE — 85027 COMPLETE CBC AUTOMATED: CPT

## 2023-08-06 PROCEDURE — 83735 ASSAY OF MAGNESIUM: CPT

## 2023-08-06 PROCEDURE — 80048 BASIC METABOLIC PNL TOTAL CA: CPT

## 2023-08-06 RX ADMIN — LEVOTHYROXINE SODIUM 137 MCG: 0.11 TABLET ORAL at 05:31

## 2023-08-06 RX ADMIN — ACETAMINOPHEN 650 MG: 325 TABLET, FILM COATED ORAL at 03:52

## 2023-08-06 RX ADMIN — CLOPIDOGREL BISULFATE 75 MG: 75 TABLET ORAL at 08:12

## 2023-08-06 RX ADMIN — ESCITALOPRAM OXALATE 5 MG: 5 TABLET, FILM COATED ORAL at 08:12

## 2023-08-06 ASSESSMENT — COGNITIVE AND FUNCTIONAL STATUS - GENERAL
HELP NEEDED FOR PERSONAL GROOMING: 3 - A LITTLE
AFFECT: WFL
MOVING TO AND FROM BED TO CHAIR: 2 - A LOT
STANDING UP FROM CHAIR USING ARMS: 3 - A LITTLE
HELP NEEDED FOR BATHING: 2 - A LOT
MOVING TO AND FROM BED TO CHAIR: 3 - A LITTLE
DRESSING REGULAR LOWER BODY CLOTHING: 2 - A LOT
EATING MEALS: 3 - A LITTLE
WALKING IN HOSPITAL ROOM: 2 - A LOT
TOILETING: 2 - A LOT
DRESSING REGULAR UPPER BODY CLOTHING: 3 - A LITTLE
CLIMB 3 TO 5 STEPS WITH RAILING: 2 - A LOT
STANDING UP FROM CHAIR USING ARMS: 2 - A LOT
CLIMB 3 TO 5 STEPS WITH RAILING: 2 - A LOT
WALKING IN HOSPITAL ROOM: 2 - A LOT
AFFECT: WFL

## 2023-08-06 NOTE — PROGRESS NOTES
Physical Therapy -  Initial Evaluation     Patient: Samy Glass  Location: [place] [address]  MRN: [id number]  Today's date: 8/6/2023    HISTORY OF PRESENT ILLNESS     Samy is a 89 y.o. male admitted on 8/3/2023 with Acute on chronic systolic (congestive) heart failure (CMS/HCC) [I50.23]. Principal problem is Acute on chronic systolic (congestive) heart failure (CMS/HCC).    Past Medical History  Samy has no past medical history on file.    History of Present Illness   fall, R internal iliac artery aneurysm with no surgical intervention, minimally displaced right inferior pubic rami fracture, large L pleural effusion  moderate T4 and L3 compression fractures, age indeterminate  s/p 8/4 US guided L thoracentesis    PRIOR LEVEL OF FUNCTION AND LIVING ENVIRONMENT     Prior Level of Function    Flowsheet Row Most Recent Value   Ambulation assistive person   Transferring assistive person   Toileting assistive person   Bathing assistive person   Dressing assistive person   Eating independent   IADLs assistive person   Driving/Transportation friends/family   Communication understands/communicates without difficulty   Prior Level of Function Comment MinAx1 at baseline, ambulates with RW household distances. mostly sits in high back chair or w/c   Assistive Device Currently Used at Home walker, front-wheeled, wheelchair, hospital bed, grab bar        Prior Living Environment    Flowsheet Row Most Recent Value   People in Home other (see comments)  [24 hr caregivers between agency and family]   Current Living Arrangements home   Home Accessibility ramp to enter home   Living Environment Comment home with ramp entry        VITALS AND PAIN     PT Vitals    Date/Time Pulse HR Source SpO2 Pt Activity O2 Therapy O2 Del Method O2 Flow Rate BP BP Location BP Method Pt Position Observations [facility]   08/06/23 1133 88 Monitor 98 % At rest Supplemental oxygen Nasal cannula 1 L/min 92/55 Left upper arm  Automatic Sitting -- RM   08/06/23 1140 102 -- 95 % At rest None (Room air) -- -- 65/47 -- -- -- standing RM   08/06/23 1157 99 Monitor 95 % At rest None (Room air) -- -- 93/61 Left upper arm Automatic Sitting reclined in chair, LEs elevated RM      PT Pain    Date/Time Pain Type Side/Orientation Location Rating: Rest Rating: Activity Interventions [facility]   08/06/23 1133 Pain Assessment right hip 4 - moderate pain 4 - moderate pain position adjusted;cold applied RM        Objective   OBJECTIVE     Start time:  1133  End time:  1159  Session Length: 26 min  Mode of Treatment: physical therapy    General Observations  Patient received in chair, upright. He was no issues or concerns identified by nurse prior to session, agreeable to therapy. son Marko present for session. pt received in chair with ice pain on R groin    Precautions: fall, weight bearing, oxygen therapy device and L/min (received on 1L O2 NC, baseline systolic BP 90s)  Extremity Weight-Bearing Status: right lower extremity, right upper extremity  Right UE Weight-Bearing Status: weight-bearing as tolerated (WBAT)  Right LE Weight-Bearing Status: weight-bearing as tolerated (WBAT)    Limitations/Impairments: safety/cognitive, hearing      PT Eval and Treat - 08/06/23 1133        Cognition    Orientation Status oriented x 4     Affect/Mental Status WFL     Follows Commands follows two-step commands;over 90% accuracy;initiation impaired;verbal cues/prompting required;physical/tactile prompts required     Comment, Cognition Pt very pleasant and cooperative, asks appropriate questions. Son helpful in corroborating history/PLOF. Benefits from cues for sequencing tasks.        Sensory Assessment    Sensory Assessment sensation intact, lower extremities   denies numbness/tingling       Lower Extremity Assessment    LE Assessment ROM and Strength WFL except     General Observations RLE AROM limited by pain        Lower Extremity Strength    Hip, Right (Strength) flex  3-     Knee, Right (Strength) ext 3-        Motor Skills    Functional Endurance limited by hypotension        Bed Mobility    Comment pt received OOB        Mobility Belt    Mobility Belt Used for All Out of Bed Activity no     Reason Mobility Belt Not Used medical contraindication     Reason Mobility Belt Not Used ostomy        Sit/Stand Transfer    Surface chair with arm rests     [place] minimum assist (75% or more patient effort);2 person assist     Safety/Cues hand placement;technique;preparatory posture;sequencing     Assistive Device walker, front-wheeled     Transfer Comments Pt stands from recliner with minAx2 to steady, requiring modAx1 initially due to posterior lean, progressing to Robbi. Requires increased time to achieve improved static standing balance. Requires min-modA to steady while shuffling feet into widened WILMAR. Displays poor RLE weight acceptance and SLS stability. Pt denies dizziness/lightheadedness though BP drops 65/47. recovers to 90s/60s back in chair with LEs elevated        Gait Training    Comment (Gait/Stairs) pt does not take appreciable steps, limited by +orthostasis        Balance    Static Sitting Balance WFL;sitting in chair   sitting at EOC    Dynamic Sitting Balance moderate impairment;supported     Static Standing Balance mild impairment;supported;standing     Dynamic Standing Balance moderate impairment;supported;standing     Comment, Balance stands with RW and min-mod posterior lean. requires modAx1-2 to scoot ant/posteriorly in chair with VC/TC to facilitate anterior and lateral weight shift        Lower Extremity (Therapeutic Exercise)    Comment Educated pt on importance of getting OOB daily with RN staff, LE HEP to be completed throughout the day including ankle pumps, LAQ, hip flex.        Impairments/Safety Issues    Impairments Affecting Function balance;pain;endurance/activity tolerance;range of motion (ROM);strength     Safety Issues Affecting Function insight  into deficits/self-awareness;sequencing abilities;positioning of assistive device     Comment, Safety Issues/Impairments requires frequent multimodal cues for sequencing mobility                               Education Documentation  Joint Mobility/Strength, taught by Isabel Mtz, PT at 8/6/2023 12:05 PM.  Learner: Family, Patient  Readiness: Acceptance  Method: Explanation  Response: Verbalizes Understanding  Comment: role of IPPT, safe mobility techniques, fall risk, importance of time spent OOB, LE HEP throughout the day        Session Outcome  Patient upright, in chair at end of session, all needs met, personal items in reach, call light in reach. Nursing notified about change in vital signs, patient's performance, patient's position, and patient's response to therapy/activity. (pt left on RA)    AM-PAC™ - Mobility (Current Function)     Turning form your back to your side while in flat bed without using bedrails 3 - A Little   Moving from lying on your back to sitting on the side of a flat bed without using bedrails 3 - A Little   Moving to and from a bed to a chair 3 - A Little   Standing up from a chair using your arms 3 - A Little   To walk in a hospital room 2 - A Lot   Climbing 3-5 steps with a railing 2 - A Lot   AM-PAC™ Mobility Score 16      ASSESSMENT AND PLAN     Progress Summary  Pt seen for PT eval and requires minAx2 for sit<>stand and min-modAx1 to stand with RW. Limited by decreased RLE ROM and strength, limited by pain. Pt also with significant drop in blood pressure upon standing to 60s/40s and was asymptomatic. Pt's son present for session and reports pt has 24/7 assistance at home. Aim home with assistance and home PT pending medical stability and increased mobility.    Patient/Family Therapy Goals Statement: to go home    PT Plan    Flowsheet Row Most Recent Value   Rehab Potential good, to achieve stated therapy goals at 08/06/2023 1133   Therapy Frequency 5 times/wk at 08/06/2023  1133   Planned Therapy Interventions balance training, bed mobility training, gait training, home exercise program, postural re-education, strengthening, patient/family education, stair training, transfer training at 08/06/2023 1133          PT Discharge Recommendations    Flowsheet Row Most Recent Value   PT Recommended Discharge Disposition home with assistance, home with home health at 08/06/2023 1133   Anticipated Equipment Needs at Discharge (PT) none  [has RW, w/c] at 08/06/2023 1133               PT Goals    Flowsheet Row Most Recent Value   Bed Mobility Goal 1    Activity/Assistive Device bed mobility activities, all at 08/06/2023 1133   [place] minimum assist (75% or more patient effort) at 08/06/2023 1133   Time Frame short-term goal (STG) at 08/06/2023 1133   Progress/Outcome goal ongoing at 08/06/2023 1133   Transfer Goal 1    Activity/Assistive Device all transfers at 08/06/2023 1133   [place] minimum assist (75% or more patient effort) at 08/06/2023 1133   Time Frame short-term goal (STG) at 08/06/2023 1133   Progress/Outcome goal ongoing at 08/06/2023 1133   Gait Training Goal 1    Activity/Assistive Device gait (walking locomotion) at 08/06/2023 1133   [place] minimum assist (75% or more patient effort) at 08/06/2023 1133   Distance 50 at 08/06/2023 1133   Time Frame short-term goal (STG) at 08/06/2023 1133   Progress/Outcome goal ongoing at 08/06/2023 1133

## 2023-08-06 NOTE — PROGRESS NOTES
Internal Medicine  Daily Progress Note       SUBJECTIVE   This is a 89 y.o. year-old male admitted on 8/3/2023 with Acute on chronic systolic (congestive) heart failure (CMS/HCC) [I50.23].    Interval History: NAEON/ Patient feels well this am, c/o pain along right side of body consistent with his fall. Nurses helping him into chair, very weak with legs giving out at times.      OBJECTIVE   Vital signs in last 24 hours:  Temp:  [36.4 °C (97.5 °F)-36.7 °C (98 °F)] 36.4 °C (97.5 °F)  Heart Rate:  [72-98] 72  Resp:  [16-18] 16  BP: (102-109)/(59-70) 103/59  SpO2:  [94 %-96 %] 96 %  Oxygen Therapy: Supplemental oxygen  O2 Delivery Method: Nasal cannula  O2 Flow Rate (L/min):  [1 L/min-2 L/min] 1 L/min    Weight & I/Os:  Weights (last 5 days)     Date/Time Weight    08/06/23 0500 76.9 kg (169 lb 8.5 oz)    08/05/23 0533 76.2 kg (167 lb 15.9 oz)    08/04/23 1611 75.8 kg (167 lb)    08/04/23 0600 76 kg (167 lb 8.8 oz)    08/03/23 1655 77.1 kg (169 lb 15.6 oz)          Intake/Output Summary (Last 24 hours) at 8/6/2023 0659  Last data filed at 8/6/2023 0500  24 Hour Net Input/Output from 7AM Yesterday   Intake 480 ml   Output 0 ml   Net 480 ml        PHYSICAL EXAMINATION   Physical Exam  Constitutional:       Appearance: Normal appearance. He is ill-appearing.   Cardiovascular:      Rate and Rhythm: Normal rate. Rhythm irregular.      Pulses: Normal pulses.      Heart sounds: Normal heart sounds.   Pulmonary:      Effort: Pulmonary effort is normal.      Breath sounds: Normal breath sounds.   Abdominal:      General: Abdomen is flat.      Palpations: Abdomen is soft.   Musculoskeletal:      Right lower leg: No edema.      Left lower leg: No edema.   Skin:     General: Skin is dry.   Neurological:      Mental Status: He is alert and oriented to person, place, and time.   Psychiatric:         Mood and Affect: Mood normal.         Behavior: Behavior normal.          LINES, CATHETERS, DRAINS, AIRWAYS, & WOUNDS   Lines,  drains, airways, & wounds:  Peripheral IV (Adult) 08/03/23 Anterior;Left Forearm (Active)   Number of days: 3       Colostomy LLQ (Active)   Number of days: 3       External Urinary Catheter (Active)   Number of days: 3        LABS, IMAGING, & TELE   Labs:  I have reviewed the patient's pertinent labs. Pertinent labs are within normal limits.    Results from last 7 days   Lab Units 08/06/23  0543 08/05/23  0548 08/04/23  0614   WBC K/uL 5.84 5.78 5.23   HEMOGLOBIN g/dL 10.7* 10.9* 11.5*   HEMATOCRIT % 32.6* 33.6* 36.2*   PLATELETS K/uL 111* 112* 125*       Results from last 7 days   Lab Units 08/06/23  0543 08/05/23  0548 08/04/23  1851 08/04/23  0614 08/03/23  1839 08/03/23  0832   SODIUM mEQ/L 137 137 137 141   < > 142   POTASSIUM mEQ/L 3.9 4.0 4.1 4.0   < > 4.2   CHLORIDE mEQ/L 104 103 102 104   < > 107   CO2 mEQ/L 25 20* 23 27   < > 26   BUN mg/dL 45* 45* 43* 40*   < > 39*   CREATININE mg/dL 1.7* 1.9* 2.0* 1.9*   < > 1.8*   CALCIUM mg/dL 7.9* 8.1* 8.1* 8.3*   < > 8.9   ALBUMIN g/dL  --   --   --  3.4*  --  3.5   BILIRUBIN TOTAL mg/dL  --   --   --  1.4*  --  0.8   ALK PHOS IU/L  --   --   --  97  --  103   ALT IU/L  --   --   --  10  --  11   AST IU/L  --   --   --  35  --  36   GLUCOSE mg/dL 123* 106* 158* 115*   < > 115*    < > = values in this interval not displayed.     Imaging personally reviewed (does not include unread studies):  No results found.  Telemetry/EKG:  Patient is not on telemetry.     ASSESSMENT & PLAN   UTI (urinary tract infection)  Assessment & Plan  Patient with recent UTI being treated with 7-day course of ciprofloxacin as an outpatient (initiated 7/29/2023)  Denies urinary complaints    - Complete ciprofloxacin course      Elevated troponin  Assessment & Plan  Minimally elevated on admission, likely demand in the setting of acute CHF  No chest pain and ECG without acute ST-T wave abnormalities    - Repeat ECG and troponin PRN for chest pain    Atrial fibrillation (CMS/[facility])  Assessment &  Plan  History of chronic atrial fibrillation on metoprolol succinate 25 mg daily  Previously on Eliquis however does not appear to have tolerated due to diverticular bleeding; also may not be a good candidate due to falls  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 Only 60 mL clear yellow pleural fluid d/t patient going hypotensive during procedure  - Fluid protein- 4.8 and Serum protein 8.3. Ratio= .58 .  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)  Assessment & Plan  CT abdomen/pelvis noted an incidental 3.5 x 3.5 cm internal iliac artery aneurysm that is partially thrombosed  Stable in size from prior CT abdomen/pelvis in 10/2022    - Vascular surgery consulted-no acute surgical intervention, will follow-up as an outpatient  - Continue supportive management with BP control    CALI (acute kidney injury) (CMS/HCC)  Assessment & Plan  Cr 1.8 on presentation, last 1.6 in 4/2023 with prior baseline ~1.0 from 11/2022  Likely 2/2 cardiorenal syndrome in the setting of acute CHF  S/p 500 cc NSS in ED due to soft BP    - BMP daily   - Avoid nephrotoxins, dose meds renally  - Heparin for DVT prophylaxis    Respiratory failure with hypoxia (CMS/HCC)  Assessment & Plan  Hypoxia to 89% on arrival which improved on 2 L NC  In the setting of CT chest revealing large left and small right pleural effusions  No wheezing on exam, has had a productive  cough likely cardiac in nature due to volume overload    - Plan as per acute CHF problem and pleural effusion problem  - Wean O2 as able to maintain O2 sat >92% (O2 sat is currently at 95 on 2L NC)    Coronary artery disease  Assessment & Plan  Hx CABG in 1996, subsequent adenosine MPI negative for ischemia in 6/13  On Plavix  No chest pain, ECG without acute ischemic changes, trop plateau'd    - Continue Plavix    Fall  Assessment & Plan  Fall after standing from bed, likely mechanical in the setting of generalized weakness and hypoxia versus a component of orthostatic hypotension  No associated loss of consciousness or seizure activity  CT head on admission without acute intracranial abnormality    Plan:   - Ortho consult- no intervention needed for hip fracture  - Fall precautions  - PT/OT  - orthostatic vitals    * Acute on chronic systolic (congestive) heart failure (CMS/HCC)  Assessment & Plan  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 Jeffrey at [facility]  Home regimen: Metoprolol 25 mg daily, Entresto, Toremide 40 mg every other day  Dry weight 166 (reported). Weight at 167  Physical exam shows crackles, , CT chest shows large left and small right pleural effusions    CHF exacerbation likely in the s/o increased fluid intake vs dietary indiscretion vs component of progression of known RV dysfunction due to pulmonary HTN; otherwise low suspicion for ACS as trop plateau'd and ECG without acute ischemic change    S/p 40 mg IV Lasix in the ED      - Hold diuresis with IV lasix, re-dose based on Cr response and UOP  - Order Echo to evaluate heart function  - Hold GDMT given soft BP and decompensated CHF, resume as able  - Cards c/s, preferably CHF/pHTN team  - CHF orderset: strict I&Os, daily standing wt, replete lytes for goal Mg>2 and K>4, cardiac  diet with fluid restriction       VTE Assessment: Padua    VTE Prophylaxis: Current anticoagulants:    •None      Code Status: Full Code  Estimated discharge date: 8/6/2023         ATTENDING DOCUMENTATION  ALSO SEE ATTENDING ATTESTATION SECTION OF NOTE

## 2023-08-06 NOTE — PROGRESS NOTES
Occupational Therapy -  Initial Evaluation     Patient: Samy Glass  Location: [place] [address]  MRN: [id number]  Today's date: 8/6/2023    HISTORY OF PRESENT ILLNESS     Samy is a 89 y.o. male admitted on 8/3/2023 with Acute on chronic systolic (congestive) heart failure (CMS/HCC) [I50.23]. Principal problem is Acute on chronic systolic (congestive) heart failure (CMS/HCC).    Past Medical History  Samy has no past medical history on file.    History of Present Illness   fall, R internal iliac artery aneurysm with no surgical intervention, minimally displaced right inferior pubic rami fracture, large L pleural effusion  moderate T4 and L3 compression fractures, age indeterminate  s/p 8/4 US guided L thoracentesis    PRIOR LEVEL OF FUNCTION AND LIVING ENVIRONMENT     Prior Level of Function    Flowsheet Row Most Recent Value   Ambulation assistive person   Transferring assistive person   Toileting assistive person   Bathing assistive person   Dressing assistive person   Eating independent   IADLs assistive person   Driving/Transportation friends/family   Communication understands/communicates without difficulty   Prior Level of Function Comment MinAx1 at baseline, ambulates with RW household distances. mostly sits in high back chair or w/c   Assistive Device Currently Used at Home walker, front-wheeled, wheelchair, hospital bed, grab bar        Prior Living Environment    Flowsheet Row Most Recent Value   People in Home other (see comments)   Current Living Arrangements home   Home Accessibility ramp to enter home   Living Environment Comment home with ramp entry        Occupational Profile    Flowsheet Row Most Recent Value   Occupational History/Life Experiences retired        VITALS AND PAIN     OT Vitals    Date/Time Pulse HR Source Resp SpO2 Pt Activity O2 Therapy BP BP Location BP Method Pt Position Observations Who   08/06/23 1140 102 -- -- 95 % At rest None (Room air) 65/47 --  -- -- standing RM   08/06/23 1157 99 Monitor -- 95 % At rest None (Room air) 93/61 Left upper arm Automatic Sitting reclined in chair, LEs elevated RM   08/06/23 1157 -- -- 18 -- -- -- -- -- -- -- -- DMW        Objective   OBJECTIVE     Start time:  1135  End time:  1159  Session Length: 24 min  Mode of Treatment: occupational therapy    General Observations  Patient received chair position. He was agreeable to therapy, no issues or concerns identified by nurse prior to session.      Precautions: fall, weight bearing, oxygen therapy device and L/min  Extremity Weight-Bearing Status: right lower extremity, right upper extremity  Right UE Weight-Bearing Status: weight-bearing as tolerated (WBAT)  Right LE Weight-Bearing Status: weight-bearing as tolerated (WBAT)    Limitations/Impairments: safety/cognitive, hearing      OT Eval and Treat - 08/06/23 1134        Cognition    Orientation Status oriented x 4     Affect/Mental Status WFL     Follows Commands WFL     Cognitive Function WFL        Vision Assessment/Intervention    Vision Assessment WFL        Hearing Assessment    Hearing Status WFL        Sensory Assessment    Sensory Assessment sensation intact, upper extremities        Upper Extremity Assessment    UE Assessment ROM and Strength WFL        Sit/Stand Transfer    [place] minimum assist (75% or more patient effort);2 person assist     Safety/Cues verbal cues     Assistive Device walker, front-wheeled     Transfer Comments posterior lean with standing.  decreased in BP with standing, no symptoms.        Functional Mobility    Functional Mobility [place] not tested        Grooming    Tasks washes, rinses and dries face     [place] supervision     Position supported sitting        Balance    Static Sitting Balance WFL     Dynamic Sitting Balance moderate impairment;supported;sitting in chair     Static Standing Balance moderate impairment;supported;standing        Impairments/Safety Issues     Impairments Affecting Function balance;coordination;endurance/activity tolerance     Safety Issues Affecting Function ability to follow commands;impulsivity;judgment;problem-solving;insight into deficits/self-awareness                               Education Documentation  Signs/Symptoms, taught by Crystal Collier OT at 8/6/2023  3:34 PM.  Learner: Patient  Readiness: Acceptance  Method: Explanation, Demonstration  Response: Needs Reinforcement  Comment: safe transfers    Risk Factors, taught by Crystal Collier OT at 8/6/2023  3:34 PM.  Learner: Patient  Readiness: Acceptance  Method: Explanation, Demonstration  Response: Needs Reinforcement  Comment: safe transfers        Session Outcome  Patient upright, in chair at end of session, all needs met, call light in reach, personal items in reach (son in room). Nursing notified about change in vital signs, patient's performance, and patient's position.    AM-PAC™ - ADL (Current Function)     Putting on/taking off regular lower body clothing 2 - A Lot   Bathing 2 - A Lot   Toileting 2 - A Lot   Putting on/taking off regular upper body clothing 3 - A Little   Help for taking care of personal grooming 3 - A Little   Eating meals 3 - A Little   AM-PAC™ ADL Score 15      ASSESSMENT AND PLAN     Progress Summary  OT eval completed. Pt requires assistance with ADLs and transfers. Pt required assistance to mantain standing 2* dec balance. pt with drop in BP with standing.  cont OT services.         OT Plan    Flowsheet Row Most Recent Value   Rehab Potential fair, will monitor progress closely at 08/06/2023 1134   Therapy Frequency 3 times/wk at 08/06/2023 1134   Planned Therapy Interventions activity tolerance training, patient/caregiver education/training, occupation/activity based interventions, strengthening exercise, transfer/mobility retraining, adaptive equipment training, BADL retraining, functional balance retraining, ROM/therapeutic exercise at 08/06/2023 1134          OT  Discharge Recommendations    Flowsheet Row Most Recent Value   OT Recommended Discharge Disposition home with home health, home with assistance at 08/06/2023 1134   Anticipated Equipment Needs At Discharge (OT) none at 08/06/2023 1134               OT Goals    Flowsheet Row Most Recent Value   Bed Mobility Goal 1    Activity/Assistive Device bed mobility activities, all at 08/06/2023 1134   [place] supervision required at 08/06/2023 1134   Time Frame by discharge at 08/06/2023 1134   Transfer Goal 1    Activity/Assistive Device all transfers at 08/06/2023 1134   [place] supervision required at 08/06/2023 1134   Time Frame by discharge at 08/06/2023 1134   Dressing Goal 1    Activity/Adaptive Equipment dressing skills, all at 08/06/2023 1134   [place] supervision required at 08/06/2023 1134   Time Frame by discharge at 08/06/2023 1134   Toileting Goal 1    Activity/Assistive Device toileting skills, all at 08/06/2023 1134   [place] supervision required at 08/06/2023 1134   Time Frame by discharge at 08/06/2023 1134

## 2023-08-06 NOTE — PLAN OF CARE
Problem: Adult Inpatient Plan of Care  Goal: Optimal Comfort and Wellbeing  Outcome: Progressing   Plan of Care Review  Plan of Care Reviewed With: patient  Progress: improving  Outcome Evaluation: patient OOB to chair with assist x2, BP low , asymptomatic.

## 2023-08-06 NOTE — PROGRESS NOTES
Medicine Attending:     I personally evaluated and examined the patient. I reviewed the chart, laboratory data and radiological examinations, and determined the diagnosis and plan. I discussed the case with the Resident/HEIDI/SELINA/sub-I medical student and agree with the findings and plan as documented in the note except for my comments below or within the additional notes today.      Patient says feels well, only complaint is R groin pain with movement.   Afeb, vss, 65/47 standing, 93/61 sitting reclined in chair, 95%RA  Exam nad, aaox3, frail and chronically ill-appearing, though very pleasant and upbeat attitude, lungs diminished L side, R lung clear, abd soft nt/nd, no rrg, normal brown ostomy output, no leg edema, warm extremities, neuro strength 4+ out of 5 all extremities, normal sensation, cranial nerves II through XII intact, scalp hematoma stable  Labs creatinine 1.9-->1.7, WBC 5.8, hemoglobin 11, platelets 111     Plan:  # acute hypoxia, found to have Large L pleural effusion, suspect likely 2/2 decompensated CHF, however asymmetric/unilateral so obtained IR thoracentesis.   -IR only able to remove 60 cc pleural fluid because blood pressure dropped significantly during the procedure so therapeutic thora not performed.  Patient asymptomatic after returned from IR, blood pressure 80 systolic at that time.  He says systolic BP usually runs low 100s at baseline.  -Pleural fluid studies consistent with exudate: He has no current cough, he is afebrile with no leukocytosis.  He is nontoxic-appearing.  No clinical evidence of pneumonia.  Pleural fluid culture no growth to date, AFB and fungal stain negative, follow-up cytology.  Monitor off antibiotics at this time.  -TTE EF 45-50%, severe RV overload, severe TR, unchanged compared to prior. We will hold off on diuresis at this time given hypotension, suspect intravascular depleted on presentation. Cr improving since held diuresis.   --orthostatic with standing  today but BP improves after sat in chair. Continue hold metoprolol and entresto with hypotension, discussed with Dr Sharma today 8/6. Add b/l compressions stockings. Orthostatic precautions.   -If BP improved Monday we will attempt left therapeutic thoracentesis at that time.  -Cardiology following with h/o severe RH failure     # R iliac artery aneurysm   - vascular surgery c/s recs no surgical intervention, recs OP f/u     # Fall, sounds mechanical, in setting of hypoxia, FTT, is supposed to use walker  -X-ray right hip shows  Minimally displaced right inferior pubic ramus fracture.  Orthopedic surgery evaluated and recommends no surgical intervention required at this time.  WBAT RLE per Ortho.  - pt/ot/pmnr  - pain control     # CALI, unknown baseline.  Creatinine up slightly today after diuresis given in ER.  Suspect he may be intravascular depleted given this trend in creatinine and hypotension.  Hold further diuresis at this time.  Encourage p.o. intake.  Trend BMP, cr improving since held diuresis.  Postvoid bladder scan 158 cc, will continue to monitor. +protein gap, consider spep/upep if cr does not improve with diuresis.     # Moderate T4 and mild L3 compression fractures, age indeterminate. No midline ttp on exam. Suspect chronic. C/s PMNR.     # Afib: not on AC, HR currently controlled. Hold bb with hypotension     # Hypothyroidism, tsh low, ft4 wnl. Continue home synthroid. OP f/u.     # Recent UTI, complete course of antibiotics with ceftriaxone, transitioned from Cipro at home given prolonged QTc.    # h/o fourniere's gangrene: wounds completely healed per aide, will assess. Continue ostomy care.     Ludwig Montenegro MD  Pager #[phone]     NOTE:   Some or all of the note above was created with the use of dictation software, please note this dictation software can have abnormalities in its ability to transcribe. Please contact me directly for anything that seems abnormal, for clarification.

## 2023-08-06 NOTE — PROGRESS NOTES
CARDIOLOGY FOLLOW UP NOTE    Subjective     Reason for consult  Concern for RV Failure    History of Present Illness  Samy Glass is a 89 y.o. year old male with a past medical history of ischemic cardiomyopathy EF most recently of 40% s/p CABG in 1996, biventricular heart failure, chronic atrial fibrillation, dyslipidemia, history of Justin's gangrene, hypertension, hx of GI bleed who presents with an episode of weakness. Has been hypotensive.     He feels well this AM. BP improved with holding GDMT and diuretics. He has severe pain of the R groin related to the fall.     Review of Systems  A complete 14-point review of systems is negative, except as noted in the HPI.    Past History  No past medical history on file.  No past surgical history on file.  No family history on file.  Social History     Tobacco Use   • Smoking status: Not on file   • Smokeless tobacco: Not on file   Substance Use Topics   • Alcohol use: Not on file       Allergies  No Known Allergies    Home Medications  Current Outpatient Medications   Medication Instructions   • ascorbic acid (VITAMIN C) 500 mg, oral, Daily   • calcium carbonate (TUMS) 200 mg calcium (500 mg) chewable tablet 1 tablet, oral, 2 times daily   • ciprofloxacin (CIPRO) 250 mg, oral, 2 times daily   • clopidogreL (PLAVIX) 75 mg, oral, Daily   • cyanocobalamin (VITAMIN B12) 500 mcg, oral, Daily   • escitalopram (LEXAPRO) 5 mg, oral, Daily   • levothyroxine (SYNTHROID) 137 mcg, oral, Daily (6:30a)   • magnesium oxide (MAG-OX) 400 mg, oral, 2 times daily   • melatonin 3 mg, oral, Nightly   • metoprolol succinate XL (TOPROL-XL) 25 mg, oral, Daily   • multivitamin tablet 1 tablet, oral, Daily   • omeprazole OTC (PRILOSEC OTC) 20 mg, oral, Daily before breakfast   • potassium chloride (KLOR-CON) 10 mEq CR tablet 20 mEq, oral, 2 times daily   • sacubitriL-valsartan (ENTRESTO) 24-26 mg per tablet 1 tablet, oral, 2 times daily   • thiamine 100 mg, oral, Daily   • torsemide  "(DEMADEX) 40 mg, oral, Every other day        Hospital Medications  • clopidogreL  75 mg oral Daily   • escitalopram  5 mg oral Daily   • levothyroxine  137 mcg oral Daily (6:30a)   • [Provider Managed Hold] metoprolol succinate XL  25 mg oral Daily   • [Provider Managed Hold] sacubitriL-valsartan  2 tablet oral BID   • [Provider Managed Hold] torsemide  20 mg oral BID       Objective     Physical Exam  Vitals:    08/06/23 0748   BP: (!) 103/59   Pulse: 72   Resp: 16   Temp: 36.4 °C (97.5 °F)   SpO2: 96%      Ht Readings from Last 1 Encounters:   08/04/23 1.854 m (6' 1\")      Wt Readings from Last 3 Encounters:   08/06/23 76.9 kg (169 lb 8.5 oz)    Body mass index is 22.37 kg/m².    Intake/Output Summary (Last 24 hours) at 8/6/2023 0906  Last data filed at 8/6/2023 0500  Gross per 24 hour   Intake 240 ml   Output 0 ml   Net 240 ml       Physical Exam  Constitutional:       General: He is not in acute distress.     Comments: Appropriate and conversant   HENT:      Head:      Comments: Pt with bruising scattered along his forehead     Right Ear: External ear normal.      Left Ear: External ear normal.      Mouth/Throat:      Mouth: Mucous membranes are moist.   Eyes:      Pupils: Pupils are equal, round, and reactive to light.   Neck:      Comments: JVP persistently above the level of the clavicle  Cardiovascular:      Rate and Rhythm: Normal rate. Rhythm irregular.      Pulses: Normal pulses.   Pulmonary:      Effort: Pulmonary effort is normal.      Breath sounds: No wheezing.      Comments: Decreased over the left chest wall  Abdominal:      General: Abdomen is flat. There is no distension.   Musculoskeletal:      Right lower leg: No edema.      Left lower leg: No edema.   Skin:     General: Skin is warm.      Capillary Refill: Capillary refill takes less than 2 seconds.      Coloration: Skin is pale.   Neurological:      General: No focal deficit present.      Mental Status: He is alert and oriented to person, " place, and time.   Psychiatric:         Mood and Affect: Mood normal.         Behavior: Behavior normal.       Relevant data reviewed:    ECG: Atrial fibrillation   Right bundle branch block   Abnormal ECG   No previous ECGs available   Confirmed by Nubia Rincon ([id number]) on 8/3/2023 5:08:51 PM    Telemetry: atrial fibrillation seen    Labs  Lab Results   Component Value Date    WBC 5.84 08/06/2023    HGB 10.7 (L) 08/06/2023     (L) 08/06/2023    CHOL 105 08/04/2023    TRIG 89 08/04/2023    HDL 27 (L) 08/04/2023    LDLCALC 60 08/04/2023    ALT 10 08/04/2023    AST 35 08/04/2023     08/06/2023    K 3.9 08/06/2023    CREATININE 1.7 (H) 08/06/2023    TSH 0.09 (L) 08/03/2023    INR 1.4 08/03/2023    HGBA1C 6.4 (H) 08/03/2023         Assessment & Plan     1. Acute on Chronic Biventricular Heart Failure  Patient presenting with some clinical information that appears to be volume overloaded with modestly elevated BNP as well as bilateral pleural effusions. On examination at this time patient appears euvolemic    TTE unchanged.     -Agree with holding GDMT Entresto  - Can trial low dose Toprol of 12.5 mg daily  - He appears euvolemic, JVD exam is unreliable in setting of severe TR. Can continue to hold diuretic.   - Continue to obtain standing daily weights if possible.  Patient notes that his dry weight seems to be around 165 pounds.  - Please ambulate patient as much as possible as staying in bed will further exacerbate his weakness and hypotension.  - Can see how he does on low dose beta blocker, he should ambulate and work with PT to see how he does.       2. CAD s/p CABG  Patient with history of CAD status post CABG in 1996.  Maintained on Plavix.  He is on statin as an outpatient and can continue while inpatient    3. Fall  It does not appear the patient's cardiac history has played a significant role in this patient's fall as he relays no prodromal symptoms and states that his legs just gave out.  In a  89-year-old gentleman our concern would be some level of orthostasis with his blood pressure running low as well as with difficulties in managing his fluid status.  -Would ensure that orthostatic vital signs are obtained  -Would consider PMR consultation to evaluate other possible etiology of the patient's fall    4. Atrial Fibrillation  History of chronic atrial fibrillation maintained on beta-blocker.  Beta-blocker currently on hold in the setting of hypotension.  Patient not on anticoagulation at this time given GI bleeding.  We will continue to follow-up with regards to appropriateness of Watchman procedure as an outpatient.  Aorta.  This conversation has already taken place as an outpatient.      Rafy Sharma MD

## 2023-08-06 NOTE — PLAN OF CARE
Problem: Adult Inpatient Plan of Care  Goal: Plan of Care Review  Outcome: Progressing  Flowsheets (Taken 8/6/2023 1204)  Progress: improving  Outcome Evaluation:   PT eval completed   anticipate home with assistance and home PT pending additional mobility  Plan of Care Reviewed With: patient     Problem: Mobility Impairment  Goal: Optimal Mobility [place] and Safety  Outcome: Progressing

## 2023-08-06 NOTE — HOSPITAL COURSE
Samy is a 89 y.o. male admitted on 8/3/2023 with Acute on chronic systolic (congestive) heart failure (CMS/HCC) [I50.23]. Principal problem is Acute on chronic systolic (congestive) heart failure (CMS/HCC).    Past Medical History  Samy has no past medical history on file.    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

## 2023-08-06 NOTE — PLAN OF CARE
Problem: Adult Inpatient Plan of Care  Goal: Plan of Care Review  Outcome: Progressing  Flowsheets (Taken 8/6/2023 5732)  Progress: improving  Outcome Evaluation: OT jovanni completed  Plan of Care Reviewed With: patient     Problem: Self-Care Deficit  Goal: Improved Ability to Complete Activities of Daily Living  Outcome: Progressing

## 2023-08-07 ENCOUNTER — APPOINTMENT (INPATIENT)
Dept: RADIOLOGY | Facility: HOSPITAL | Age: 88
DRG: 291 | End: 2023-08-07
Payer: MEDICARE

## 2023-08-07 ENCOUNTER — APPOINTMENT (INPATIENT)
Dept: RADIOLOGY | Facility: HOSPITAL | Age: 88
DRG: 291 | End: 2023-08-07
Attending: PHYSICIAN ASSISTANT
Payer: MEDICARE

## 2023-08-07 PROBLEM — I95.1 ORTHOSTATIC HYPOTENSION: Status: ACTIVE | Noted: 2023-08-07

## 2023-08-07 LAB
ANION GAP SERPL CALC-SCNC: 10 MEQ/L (ref 3–15)
APPEARANCE FLD: ABNORMAL
BODY FLD TYPE: ABNORMAL
BUN SERPL-MCNC: 41 MG/DL (ref 7–25)
CALCIUM SERPL-MCNC: 8.2 MG/DL (ref 8.6–10.3)
CASE RPRT: NORMAL
CHLORIDE SERPL-SCNC: 103 MEQ/L (ref 98–107)
CO2 SERPL-SCNC: 22 MEQ/L (ref 21–31)
COLOR FLD: YELLOW
CREAT SERPL-MCNC: 1.3 MG/DL (ref 0.7–1.3)
ERYTHROCYTE [DISTWIDTH] IN BLOOD BY AUTOMATED COUNT: 16 % (ref 11.6–14.4)
GFR SERPL CREATININE-BSD FRML MDRD: 51.7 ML/MIN/1.73M*2
GLUCOSE FLD-MCNC: 180 MG/DL
GLUCOSE SERPL-MCNC: 136 MG/DL (ref 70–99)
HCT VFR BLDCO AUTO: 30.6 % (ref 40.1–51)
HGB BLD-MCNC: 10.4 G/DL (ref 13.7–17.5)
LDH FLD L TO P-CCNC: 186 U/L
LDH SERPL L TO P-CCNC: 130 IU/L (ref 98–271)
LYMPHOCYTES NFR FLD MANUAL: 84 %
MAGNESIUM SERPL-MCNC: 1.8 MG/DL (ref 1.8–2.5)
MCH RBC QN AUTO: 32.8 PG (ref 28–33.2)
MCHC RBC AUTO-ENTMCNC: 34 G/DL (ref 32.2–36.5)
MCV RBC AUTO: 96.5 FL (ref 83–98)
MONOS+MACROS NFR FLD MANUAL: 14 %
NEUTROPHILS NFR FLD MANUAL: 2 %
PATH REPORT.FINAL DX SPEC: NORMAL
PATH REPORT.GROSS SPEC: NORMAL
PDW BLD AUTO: 11.2 FL (ref 9.4–12.4)
PH FLD: 7.5 [PH]
PLATELET # BLD AUTO: 112 K/UL (ref 150–350)
POTASSIUM SERPL-SCNC: 3.9 MEQ/L (ref 3.5–5.1)
PROT FLD-MCNC: 4.7 G/DL
PROT SERPL-MCNC: 6.9 G/DL (ref 6–8.2)
RBC # BLD AUTO: 3.17 M/UL (ref 4.5–5.8)
RBC # SNV: ABNORMAL CELLS/CU MM (ref 0–10000)
SODIUM SERPL-SCNC: 135 MEQ/L (ref 136–145)
SPECIMEN SOURCE: ABNORMAL
WBC # BLD AUTO: 5.5 K/UL (ref 3.8–10.5)
WBC # SNV AUTO: 1515 CELLS/CU MM (ref 0–200)

## 2023-08-07 PROCEDURE — 84157 ASSAY OF PROTEIN OTHER: CPT

## 2023-08-07 PROCEDURE — 84155 ASSAY OF PROTEIN SERUM: CPT

## 2023-08-07 PROCEDURE — 83986 ASSAY PH BODY FLUID NOS: CPT

## 2023-08-07 PROCEDURE — 89050 BODY FLUID CELL COUNT: CPT

## 2023-08-07 PROCEDURE — 99233 SBSQ HOSP IP/OBS HIGH 50: CPT | Performed by: INTERNAL MEDICINE

## 2023-08-07 PROCEDURE — 99233 SBSQ HOSP IP/OBS HIGH 50: CPT | Performed by: HOSPITALIST

## 2023-08-07 PROCEDURE — 82945 GLUCOSE OTHER FLUID: CPT

## 2023-08-07 PROCEDURE — 87206 SMEAR FLUORESCENT/ACID STAI: CPT

## 2023-08-07 PROCEDURE — 36415 COLL VENOUS BLD VENIPUNCTURE: CPT

## 2023-08-07 PROCEDURE — 87015 SPECIMEN INFECT AGNT CONCNTJ: CPT

## 2023-08-07 PROCEDURE — 21400000 HC ROOM AND CARE CCU/INTERMEDIATE

## 2023-08-07 PROCEDURE — 71045 X-RAY EXAM CHEST 1 VIEW: CPT

## 2023-08-07 PROCEDURE — 87070 CULTURE OTHR SPECIMN AEROBIC: CPT

## 2023-08-07 PROCEDURE — 93005 ELECTROCARDIOGRAM TRACING: CPT | Performed by: HOSPITALIST

## 2023-08-07 PROCEDURE — 87102 FUNGUS ISOLATION CULTURE: CPT

## 2023-08-07 PROCEDURE — 63700000 HC SELF-ADMINISTRABLE DRUG

## 2023-08-07 PROCEDURE — 80048 BASIC METABOLIC PNL TOTAL CA: CPT

## 2023-08-07 PROCEDURE — 36100330 IR THORACENTESIS

## 2023-08-07 PROCEDURE — 88305 TISSUE EXAM BY PATHOLOGIST: CPT

## 2023-08-07 PROCEDURE — C1729 CATH, DRAINAGE: HCPCS

## 2023-08-07 PROCEDURE — 83735 ASSAY OF MAGNESIUM: CPT

## 2023-08-07 PROCEDURE — 83615 LACTATE (LD) (LDH) ENZYME: CPT

## 2023-08-07 PROCEDURE — 85027 COMPLETE CBC AUTOMATED: CPT

## 2023-08-07 RX ORDER — SODIUM CHLORIDE, SODIUM LACTATE, POTASSIUM CHLORIDE, CALCIUM CHLORIDE 600; 310; 30; 20 MG/100ML; MG/100ML; MG/100ML; MG/100ML
INJECTION, SOLUTION INTRAVENOUS CONTINUOUS
Status: DISCONTINUED | OUTPATIENT
Start: 2023-08-07 | End: 2023-08-07

## 2023-08-07 RX ORDER — LIDOCAINE 560 MG/1
1 PATCH PERCUTANEOUS; TOPICAL; TRANSDERMAL DAILY
Status: DISCONTINUED | OUTPATIENT
Start: 2023-08-07 | End: 2023-08-09 | Stop reason: HOSPADM

## 2023-08-07 RX ORDER — LIDOCAINE 560 MG/1
1 PATCH PERCUTANEOUS; TOPICAL; TRANSDERMAL DAILY
Status: DISCONTINUED | OUTPATIENT
Start: 2023-08-07 | End: 2023-08-07

## 2023-08-07 RX ADMIN — CLOPIDOGREL BISULFATE 75 MG: 75 TABLET ORAL at 08:27

## 2023-08-07 RX ADMIN — LIDOCAINE 1 PATCH: 4 PATCH TOPICAL at 13:06

## 2023-08-07 RX ADMIN — LEVOTHYROXINE SODIUM 137 MCG: 0.11 TABLET ORAL at 06:37

## 2023-08-07 RX ADMIN — ESCITALOPRAM OXALATE 5 MG: 5 TABLET, FILM COATED ORAL at 08:27

## 2023-08-07 ASSESSMENT — COGNITIVE AND FUNCTIONAL STATUS - GENERAL
CLIMB 3 TO 5 STEPS WITH RAILING: 2 - A LOT
MOVING TO AND FROM BED TO CHAIR: 2 - A LOT
STANDING UP FROM CHAIR USING ARMS: 2 - A LOT
WALKING IN HOSPITAL ROOM: 2 - A LOT
STANDING UP FROM CHAIR USING ARMS: 2 - A LOT
MOVING TO AND FROM BED TO CHAIR: 2 - A LOT
WALKING IN HOSPITAL ROOM: 2 - A LOT
CLIMB 3 TO 5 STEPS WITH RAILING: 2 - A LOT

## 2023-08-07 NOTE — ASSESSMENT & PLAN NOTE
- Noted to be Orthostatic since admission  - Intravascularly depleted.  - Weakness      Plan  - Not currently orthostatic   - Give b/l compression stockings  - Encourage P.O. intake

## 2023-08-07 NOTE — OR SURGEON
Pre-Procedure patient identification:  I am the primary operating surgeon/proceduralist and I have reviewed the applicable pathology reports and radiology studies for this procedure. I have identified the patient and confirmed laterality is right on 08/07/23 at 3:49 PM SOPHIA Donohue C

## 2023-08-07 NOTE — PLAN OF CARE
Patient with some confusion overnight, but able to be reoriented. Assist x1 in bed, incontinent of urine.  Afib on tele.

## 2023-08-07 NOTE — PROGRESS NOTES
CARDIOLOGY FOLLOW UP NOTE    Subjective     Reason for consult  Concern for RV Failure    History of Present Illness  Samy Glass is a 89 y.o. year old male with a past medical history of ischemic cardiomyopathy EF most recently of 40% s/p CABG in 1996, biventricular heart failure, chronic atrial fibrillation, dyslipidemia, history of Justin's gangrene, hypertension, hx of GI bleed who presents with an episode of weakness. Has been hypotensive.     Patient seen and examined the bedside this morning.  Patient states that he is overall doing well.  Creatinine improved this morning down to 1.3.  Orthostatic yesterday with blood pressure dropped to 60s over 40s with standing and urinalysis with findings of hemoconcentrated urine.  Blood pressure is now improved with holding diuretic and it would appear that at this time patient likely is volume depleted intravascularly.  Patient overall feels well denies any symptoms except for lower extremity weakness with orthostasis yesterday.  No acute events overnight.    Review of Systems  A complete 14-point review of systems is negative, except as noted in the HPI.    Past History  No past medical history on file.  No past surgical history on file.  No family history on file.  Social History     Tobacco Use   • Smoking status: Not on file   • Smokeless tobacco: Not on file   Substance Use Topics   • Alcohol use: Not on file       Allergies  No Known Allergies    Home Medications  Current Outpatient Medications   Medication Instructions   • ascorbic acid (VITAMIN C) 500 mg, oral, Daily   • calcium carbonate (TUMS) 200 mg calcium (500 mg) chewable tablet 1 tablet, oral, 2 times daily   • ciprofloxacin (CIPRO) 250 mg, oral, 2 times daily   • clopidogreL (PLAVIX) 75 mg, oral, Daily   • cyanocobalamin (VITAMIN B12) 500 mcg, oral, Daily   • escitalopram (LEXAPRO) 5 mg, oral, Daily   • levothyroxine (SYNTHROID) 137 mcg, oral, Daily (6:30a)   • magnesium oxide (MAG-OX) 400 mg,  "oral, 2 times daily   • melatonin 3 mg, oral, Nightly   • metoprolol succinate XL (TOPROL-XL) 25 mg, oral, Daily   • multivitamin tablet 1 tablet, oral, Daily   • omeprazole OTC (PRILOSEC OTC) 20 mg, oral, Daily before breakfast   • potassium chloride (KLOR-CON) 10 mEq CR tablet 20 mEq, oral, 2 times daily   • sacubitriL-valsartan (ENTRESTO) 24-26 mg per tablet 1 tablet, oral, 2 times daily   • thiamine 100 mg, oral, Daily   • torsemide (DEMADEX) 40 mg, oral, Every other day        Hospital Medications  • clopidogreL  75 mg oral Daily   • escitalopram  5 mg oral Daily   • levothyroxine  137 mcg oral Daily (6:30a)   • [Provider Managed Hold] metoprolol succinate XL  25 mg oral Daily   • [Provider Managed Hold] sacubitriL-valsartan  2 tablet oral BID   • [Provider Managed Hold] torsemide  20 mg oral BID       Objective     Physical Exam  Vitals:    08/07/23 0300   BP: 118/67   Pulse: 80   Resp: 18   Temp: 36.8 °C (98.2 °F)   SpO2: 92%      Ht Readings from Last 1 Encounters:   08/04/23 1.854 m (6' 1\")      Wt Readings from Last 3 Encounters:   08/07/23 77.2 kg (170 lb 3.1 oz)    Body mass index is 22.45 kg/m².    Intake/Output Summary (Last 24 hours) at 8/7/2023 0656  Last data filed at 8/6/2023 1738  Gross per 24 hour   Intake 540 ml   Output --   Net 540 ml       Physical Exam  Constitutional:       General: He is not in acute distress.     Comments: Appropriate and conversant   HENT:      Head:      Comments: Pt with bruising scattered along his forehead     Right Ear: External ear normal.      Left Ear: External ear normal.      Mouth/Throat:      Mouth: Mucous membranes are moist.   Eyes:      Pupils: Pupils are equal, round, and reactive to light.   Neck:      Comments: JVP elevated in the setting of TR, base at the level of the clavicle  Cardiovascular:      Rate and Rhythm: Normal rate. Rhythm irregular.      Pulses: Normal pulses.   Pulmonary:      Effort: Pulmonary effort is normal.      Breath sounds: No " wheezing.      Comments: Decreased over the left chest wall  Abdominal:      General: Abdomen is flat. There is no distension.   Musculoskeletal:      Right lower leg: No edema.      Left lower leg: No edema.   Skin:     General: Skin is warm.      Capillary Refill: Capillary refill takes less than 2 seconds.      Coloration: Skin is pale.   Neurological:      General: No focal deficit present.      Mental Status: He is alert and oriented to person, place, and time.   Psychiatric:         Mood and Affect: Mood normal.         Behavior: Behavior normal.       Relevant data reviewed:    ECG: Atrial fibrillation   Right bundle branch block   Abnormal ECG   No previous ECGs available   Confirmed by Nubia Rincon ([id number]) on 8/3/2023 5:08:51 PM    Telemetry: atrial fibrillation seen    Labs  Lab Results   Component Value Date    WBC 5.84 08/06/2023    HGB 10.7 (L) 08/06/2023     (L) 08/06/2023    CHOL 105 08/04/2023    TRIG 89 08/04/2023    HDL 27 (L) 08/04/2023    LDLCALC 60 08/04/2023    ALT 10 08/04/2023    AST 35 08/04/2023     08/06/2023    K 3.9 08/06/2023    CREATININE 1.7 (H) 08/06/2023    TSH 0.09 (L) 08/03/2023    INR 1.4 08/03/2023    HGBA1C 6.4 (H) 08/03/2023         Assessment & Plan     1. Acute on Chronic Biventricular Heart Failure  Patient presenting with some clinical information that appears to be volume overloaded with modestly elevated BNP as well as bilateral pleural effusions. On examination at this time patient appears euvolemic    TTE unchanged from prior.    -Continuing to hold low-dose GDMT at this time given lower blood pressures.  Blood pressure is now improved and this morning. Could slowly inititiate low dose beta blocker in next 24 hours or so pending improvement in orthostatics.  -Would continue to hold diuretic as with orthostasis yesterday as well as hemoconcentrated urine and improving creatinine with holding diuretic it would appear the patient likely is intravascularly  volume pleated.  Would hold off on given IV fluids with but would liberalize fluid intake and encourage p.o. intake.  This leads to the question of what is the etiology of the patient's pleural effusions given that they are exudative and patient's overall hypovolemic to euvolemic appearance at this time.  May benefit from pulmonology evaluation of recurrent pleural effusions.  - Continue to obtain standing daily weights if possible.  Patient notes that his dry weight seems to be around 165 pounds.  - Please ambulate patient as much as possible as staying in bed will further exacerbate his weakness and hypotension.      2. CAD s/p CABG  Patient with history of CAD status post CABG in 1996.  Maintained on Plavix.  He is on statin as an outpatient and can continue while inpatient    3. Fall  It does not appear the patient's cardiac history has played a significant role in this patient's fall as he relays no prodromal symptoms and states that his legs just gave out.  In a 89-year-old gentleman our concern would be some level of orthostasis with his blood pressure running low as well as with difficulties in managing his fluid status.  Suspect that there is orthostasis and weakness involved.    4. Atrial Fibrillation  History of chronic atrial fibrillation maintained on beta-blocker.  Beta-blocker currently on hold in the setting of hypotension.  Patient not on anticoagulation at this time given GI bleeding.  We will continue to follow-up with regards to appropriateness of Watchman procedure as an outpatient. This conversation has already taken place as an outpatient.  Pending orthostatic improvement will look to restart low dose beta blocker.      Eric Gilbert, DO

## 2023-08-07 NOTE — PROGRESS NOTES
Spoke to Marko Glass, Samy's son, about the status of his dad. Reported that he will be getting a thoracentesis done either today or tomorrow. We will continue to monitor his orthostatics during his admission .

## 2023-08-07 NOTE — PROGRESS NOTES
Internal Medicine  Daily Progress Note       SUBJECTIVE   This is a 89 y.o. year-old male admitted on 8/3/2023 with Acute on chronic systolic (congestive) heart failure (CMS/HCC) [I50.23].    Interval History: NAEON. Patient is incontinent. Patient was noted to be resting comfortably. Cardiology reported he can trial a low dose of Toprol 12.5mg daily. Appeared euvolemic . Currently weighs 170. Up from Yesterday. Recommend walking the patient work with PT to around as tolerated to prevent hypotension exacerbation.  Cytology still has not been reported.      OBJECTIVE      Vital signs in last 24 hours:  Temp:  [36.5 °C (97.7 °F)-36.9 °C (98.4 °F)] 36.5 °C (97.7 °F)  Heart Rate:  [78-97] 89  Resp:  [16-18] 16  BP: (104-119)/(54-67) 113/63  Temp (24hrs), Av.7 °C (98 °F), Min:36.5 °C (97.7 °F), Max:36.9 °C (98.4 °F)    Intake/Output     Intake Evening 23 1500 - 23 Night 230 - 2359 Day 23 - 23 1459 Output Evening 23 1500 - 23 Night 23 - 2359 Day 23 - 23 1459    P.O. 120 -- -- Urine 0 -- --    IV Piggyback -- -- -- Stool -- -- --    Total 120 -- -- Total -- -- --   Last 3 shifts --  Intake: 120       Output: 0       Net: 120       Weights (last 5 days)     Date/Time Weight    23 0600 77.2 kg (170 lb 3.1 oz)    23 0500 76.9 kg (169 lb 8.5 oz)    23 0533 76.2 kg (167 lb 15.9 oz)    23 1611 75.8 kg (167 lb)    23 06 76 kg (167 lb 8.8 oz)    23 1655 77.1 kg (169 lb 15.6 oz)          PHYSICAL EXAMINATION      Physical Exam  Constitutional:       Appearance: He is normal weight.   Cardiovascular:      Rate and Rhythm: Normal rate and regular rhythm.      Pulses: Normal pulses.      Heart sounds: Normal heart sounds.   Pulmonary:      Effort: Pulmonary effort is normal.      Breath sounds: Normal breath sounds.   Skin:     General: Skin is warm and dry.      Capillary Refill:  Capillary refill takes less than 2 seconds.   Neurological:      Mental Status: He is alert and oriented to person, place, and time. Mental status is at baseline.   Psychiatric:         Mood and Affect: Mood normal.         Behavior: Behavior normal.         Thought Content: Thought content normal.            LINES, CATHETERS, DRAINS, AIRWAYS, AND WOUNDS   Lines, Drains, Airways, Wounds:  Peripheral IV (Adult) 08/03/23 Anterior;Left Forearm (Active)   Number of days: 4       Colostomy LLQ (Active)   Number of days: 4       External Urinary Catheter (Active)   Number of days: 4       Comments:      LABS / IMAGING / TELE      Labs  Recent Labs   Lab 08/07/23  0602 08/06/23  0543 08/05/23  0548   * 137 137   K 3.9 3.9 4.0   CO2 22 25 20*   BUN 41* 45* 45*   CREATININE 1.3 1.7* 1.9*   GLUCOSE 136* 123* 106*   CALCIUM 8.2* 7.9* 8.1*     Results from last 7 days   Lab Units 08/07/23  0602 08/06/23  0543 08/05/23  0548   MAGNESIUM mg/dL 1.8 1.9 2.0     Results from last 7 days   Lab Units 08/07/23  0602 08/06/23  0543 08/05/23  0548   WBC K/uL 5.50 5.84 5.78   HEMOGLOBIN g/dL 10.4* 10.7* 10.9*   HEMATOCRIT % 30.6* 32.6* 33.6*   PLATELETS K/uL 112* 111* 112*       Imaging personally reviewed(does not include unread studies):  X-RAY CHEST 1 VIEW    Result Date: 8/4/2023  IMPRESSION: No appreciable pneumothorax seen following left thoracentesis. Residual moderately large left pleural effusion and small right pleural effusion with underlying consolidative opacity.     IR THORACENTESIS    Result Date: 8/4/2023  IMPRESSION: Successful ultrasound-guided left-sided thoracentesis with 60 mL pleural fluid removed and sent to the lab.  All components of the time-out, debriefing, and handling of the specimen were conducted as per the ASAP Specimen Protocol. I certify that I have personally reviewed this examination and agree with Avril Nava's report. Constantino Potts MD    X-RAY HIP WITH OR WITHOUT PELVIS 2-3 VW RIGHT    Result  Date: 8/3/2023  IMPRESSION: Osteopenia.  Minimally displaced right inferior pubic ramus fracture.     X-RAY SHOULDER RIGHT 2+ VIEWS    Result Date: 8/3/2023  IMPRESSION: No evidence of a right shoulder or humerus fracture. Possible rotator cuff calcific tendinosis. Marked limited evaluation of the right elbow. No evidence of a displaced elbow fracture. Lateral elbow examination can be considered for improved evaluation. COMMENT: 2 views right shoulder performed. Acromioclavicular and glenohumeral alignment are within normal limits for provided images (noting possible superior elevation of the humeral head related to rotator cuff tendinopathy, though this may be due to projectional artifact). No evidence of an acute fracture soft tissue swelling. Imaged portions of the right lung are clear. 2 views right humerus are performed. Possible rotator cuff calcific tendinosis. No evidence of an acute right humerus fracture. Imaged portions of the right lung are clear. No soft tissue swelling. 2 views of the right elbow performed. No evidence of displaced fracture. Markedly limited examination the absence of lateral projection.    X-RAY HUMERUS RIGHT    Result Date: 8/3/2023  IMPRESSION: No evidence of a right shoulder or humerus fracture. Possible rotator cuff calcific tendinosis. Marked limited evaluation of the right elbow. No evidence of a displaced elbow fracture. Lateral elbow examination can be considered for improved evaluation. COMMENT: 2 views right shoulder performed. Acromioclavicular and glenohumeral alignment are within normal limits for provided images (noting possible superior elevation of the humeral head related to rotator cuff tendinopathy, though this may be due to projectional artifact). No evidence of an acute fracture soft tissue swelling. Imaged portions of the right lung are clear. 2 views right humerus are performed. Possible rotator cuff calcific tendinosis. No evidence of an acute right humerus  fracture. Imaged portions of the right lung are clear. No soft tissue swelling. 2 views of the right elbow performed. No evidence of displaced fracture. Markedly limited examination the absence of lateral projection.    X-RAY ELBOW RIGHT 3+ VIEWS    Result Date: 8/3/2023  IMPRESSION: No evidence of a right shoulder or humerus fracture. Possible rotator cuff calcific tendinosis. Marked limited evaluation of the right elbow. No evidence of a displaced elbow fracture. Lateral elbow examination can be considered for improved evaluation. COMMENT: 2 views right shoulder performed. Acromioclavicular and glenohumeral alignment are within normal limits for provided images (noting possible superior elevation of the humeral head related to rotator cuff tendinopathy, though this may be due to projectional artifact). No evidence of an acute fracture soft tissue swelling. Imaged portions of the right lung are clear. 2 views right humerus are performed. Possible rotator cuff calcific tendinosis. No evidence of an acute right humerus fracture. Imaged portions of the right lung are clear. No soft tissue swelling. 2 views of the right elbow performed. No evidence of displaced fracture. Markedly limited examination the absence of lateral projection.    CT CHEST WITH IV CONTRAST    Result Date: 8/3/2023  IMPRESSION: 1. Large right and small left pleural effusions with adjacent compressive atelectasis and significant left lower lobe volume loss. 2. Cardiomegaly with IVC distention and contrast reflux in keeping with heart failure. 3. 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 4. Moderate T4 and mild L3 compression fractures, age indeterminate.  Clinically correlate.    CT ABDOMEN PELVIS WITH IV CONTRAST    Result Date: 8/3/2023  IMPRESSION: 1. Large right and small left pleural effusions with adjacent compressive atelectasis and significant left lower lobe volume loss. 2. Cardiomegaly with IVC distention  and contrast reflux in keeping with heart failure. 3. 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 4. Moderate T4 and mild L3 compression fractures, age indeterminate.  Clinically correlate.    CT HEAD WITHOUT IV CONTRAST    Result Date: 8/3/2023  IMPRESSION:  No acute intracranial abnormality.  Multiple scalp hematomas.  No evidence for acute fracture or traumatic subluxation the cervical spine.  Large left pleural effusion. COMMENT: A standard noncontrast head CT was performed. Noncontrast CT examination of the cervical spine performed following the standard protocol. Sagittal and coronal reformations rendered from axial source images. Images reviewed in bone and soft tissue windows. CT DOSE:  One or more dose reduction techniques (e.g. automated exposure control, adjustment of the mA and/or kV according to patient size, use of iterative reconstruction technique) utilized for this examination. Comparison:  No prior studies are available for comparison. Findings:  Sulci, ventricles and basal cisterns are prominent within normal limits for patient's age.  Mild periventricular and subcortical white matter hypoattenuation, nonspecific, likely sequela microangiopathic disease.  No acute hemorrhage, acute territorial infarct, or mass effect is seen.  There is no extra-axial fluid collection.  The visualized paranasal sinuses are clear. Mastoid air cells are clear.  Moderate-sized left frontal scalp hematoma.  Small right frontal scalp hematoma. Cervicothoracic alignment: Anatomic. Prevertebral soft tissues: Normal in thickness. Vertebral bodies: Normal in height.  No acute fractures.  Fusion of the posterior elements of C4 on C5. Intervertebral discs: Disc osteophyte complex throughout the cervical spine. Cervical and upper thoracic spinal canal: Patent. Axial images: Skull base: Normal. C1-2: Normal. C2-3: Facet arthrosis, right uncovertebral hypertrophic changes, contributing to mild right  foraminal narrowing. C3-4: Disc osteophyte complex with severe right greater than left uncovertebral hypertrophic changes, severe left facet hypertrophy, contributing to moderate bilateral foraminal narrowing. C4-5: Severe left and moderate right facet hypertrophy, contributing to moderate to moderate severe left foraminal narrowing. C5-6: Small disc osteophyte complex with facet arthrosis and left uncovertebral hypertrophic changes, contributing to mild left foraminal narrowing. C6-7: Mild posterior osseous spurring without consequence. C7-T1: Right facet arthrosis without consequence. Other: Large left pleural effusion.  Right apical pleural-parenchymal scarring.     CT CERVICAL SPINE WITHOUT IV CONTRAST    Result Date: 8/3/2023  IMPRESSION:  No acute intracranial abnormality.  Multiple scalp hematomas.  No evidence for acute fracture or traumatic subluxation the cervical spine.  Large left pleural effusion. COMMENT: A standard noncontrast head CT was performed. Noncontrast CT examination of the cervical spine performed following the standard protocol. Sagittal and coronal reformations rendered from axial source images. Images reviewed in bone and soft tissue windows. CT DOSE:  One or more dose reduction techniques (e.g. automated exposure control, adjustment of the mA and/or kV according to patient size, use of iterative reconstruction technique) utilized for this examination. Comparison:  No prior studies are available for comparison. Findings:  Sulci, ventricles and basal cisterns are prominent within normal limits for patient's age.  Mild periventricular and subcortical white matter hypoattenuation, nonspecific, likely sequela microangiopathic disease.  No acute hemorrhage, acute territorial infarct, or mass effect is seen.  There is no extra-axial fluid collection.  The visualized paranasal sinuses are clear. Mastoid air cells are clear.  Moderate-sized left frontal scalp hematoma.  Small right frontal scalp  hematoma. Cervicothoracic alignment: Anatomic. Prevertebral soft tissues: Normal in thickness. Vertebral bodies: Normal in height.  No acute fractures.  Fusion of the posterior elements of C4 on C5. Intervertebral discs: Disc osteophyte complex throughout the cervical spine. Cervical and upper thoracic spinal canal: Patent. Axial images: Skull base: Normal. C1-2: Normal. C2-3: Facet arthrosis, right uncovertebral hypertrophic changes, contributing to mild right foraminal narrowing. C3-4: Disc osteophyte complex with severe right greater than left uncovertebral hypertrophic changes, severe left facet hypertrophy, contributing to moderate bilateral foraminal narrowing. C4-5: Severe left and moderate right facet hypertrophy, contributing to moderate to moderate severe left foraminal narrowing. C5-6: Small disc osteophyte complex with facet arthrosis and left uncovertebral hypertrophic changes, contributing to mild left foraminal narrowing. C6-7: Mild posterior osseous spurring without consequence. C7-T1: Right facet arthrosis without consequence. Other: Large left pleural effusion.  Right apical pleural-parenchymal scarring.     Transthoracic Echo  Result date: 8/4/2023    Technically difficult study. Patient in atrial fibrillation with rates 75-85 bpm at time of examination.      • The left ventricular cavity size is small due to compression from RV with normal wall thickness and mildly reduced systolic function. Estimated EF 45 to 50 %. D shaped left ventricular cavity in the setting of severe RVoverload Unable to assess diastolic function due to atrial arrhythmia.     • The aortic valve is tricuspid. Aortic valve and root sclerosis. Mild aortic regurgitation.      • The visualized portion of the aortic root is normal in size.     • The mitral valve is thickened. Mild mitral annular calcification. Mild mitral regurgitation.      • The left atrium is mildly dilated     • The right ventricular cavity is severely  dilated with severely decreased systolic function. Nunes's sign is present.      • The pulmonic valve is not well seen.     • Severely dilated tricuspid valve annulus with tricuspid leaflets displaying a large coaptation gap; approximately 1 cm Severe tricuspid regurgitation with an estimated RVSP of at least 35 mmHg, but likely likely much higher in the setting of severe TR.  Diastolic flow reversal in the hepatic veins.      • The right atrium is severely dilated.     • The IVC is massively dilated and collapses < 50% with inspiration consistent with severely elevated right atrial pressures.     • Large pleural effusion; cannot exclude a pericardial component.  No obvious chamber collapse.  • Compared to previous echocardiogram from 8/4/22, no significant changes. Results communicated to primary team.         ECG/Telemetry  I have independently reviewed the telemetry. No events for the last 24 hours.    ASSESSMENT AND PLAN      Pleural effusion, bilateral  Assessment & Plan  Large left and small right pleural effusion on CT chest  Suspect etiology most likely acute CHF due to right-sided heart failure however asymmetric nature raises concern for other etiology (ie, malignancy, paramedic)      - F/U with cytology and scheduled for IR Thoracentesis for symptomatic improvement since BP has improved. (Baseline BP is low 100s)  - Fluid protein- 4.8 and Serum protein 8.3. Ratio= .58 .  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 Jeffrey at [facility]  Home regimen: Metoprolol 25 mg daily, Entresto, Toremide 40 mg every other day  Dry weight 166 (reported). Weight at 170  Physical exam shows crackles, , CT chest shows large left and small right  pleural effusions    CHF exacerbation likely in the s/o increased fluid intake vs dietary indiscretion vs component of progression of known RV dysfunction due to pulmonary HTN; otherwise low suspicion for ACS as trop plateau'd and ECG without acute ischemic change          - Continue to hold diuresis with oral torsemide , re-dose based on Cr response and UOP  - Hold GDMT given soft BP and decompensated CHF, resume as able  - Cards c/s, preferably CHF/pHTN team  - CHF orderset: strict I&Os, daily standing wt, replete lytes for goal Mg>2 and K>4, cardiac diet with fluid restriction    Orthostatic hypotension  Assessment & Plan  - Noted to be Orthostatic since admission  - Intravascularly depleted.      Plan  - Check orthostatics  - Give b/l compression stockings  - Encourage P.O. intake and hold off diuretics     Respiratory failure with hypoxia (CMS/HCC)  Assessment & Plan  Hypoxia to 89% on arrival which improved on 2 L NC  In the setting of CT chest revealing large left and small right pleural effusions  No wheezing on exam, has had a productive cough likely cardiac in nature due to volume overload    - Plan as per acute CHF problem and pleural effusion problem  - Wean O2 as able to maintain O2 sat >92% (O2 sat is currently at 95 on 2L NC)    UTI (urinary tract infection)  Assessment & Plan  Patient with recent UTI being treated with 7-day course of ciprofloxacin as an outpatient (initiated 7/29/2023)  Denies urinary complaints    - Complete Ceftriaxone course  - Switched from Cipro d/t prolonged QTc    Elevated troponin  Assessment & Plan  Minimally elevated on admission, likely demand in the setting of acute CHF  No chest pain and ECG without acute ST-T wave abnormalities    - Repeat ECG and troponin PRN for chest pain    Atrial fibrillation (CMS/HCC)  Assessment & Plan  History of chronic atrial fibrillation on metoprolol succinate 25 mg daily  Previously on Eliquis however does not appear to have tolerated due  to diverticular bleeding; also may not be a good candidate due to falls  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    - Resume metoprolol as BP allows pending improvement in decompensated CHF  - Monitor on telemetry, repeat ECG as needed    Hypertension  Assessment & Plan  Home regimen: Metoprolol 25 mg daily    - Hold BP meds given soft Bps, resume as able    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)  Assessment & Plan  CT abdomen/pelvis noted an incidental 3.5 x 3.5 cm internal iliac artery aneurysm that is partially thrombosed  Stable in size from prior CT abdomen/pelvis in 10/2022    - Vascular surgery consulted-no acute surgical intervention, will follow-up as an outpatient  - Continue supportive management with BP control    CALI (acute kidney injury) (CMS/HCC)  Assessment & Plan  Cr 1.8 on presentation, last 1.6 in 4/2023 with prior baseline ~1.0 from 11/2022  Likely 2/2 cardiorenal syndrome in the setting of acute CHF  S/p 500 cc NSS in ED due to soft BP    - BMP daily   - Encourage P.O. intake  - Avoid nephrotoxins, dose meds renally  - Heparin for DVT prophylaxis    Coronary artery disease  Assessment & Plan  Hx CABG in 1996, subsequent adenosine MPI negative for ischemia in 6/13  On Plavix  No chest pain, ECG without acute ischemic changes, trop plateau'd    - Continue Plavix    Fall  Assessment & Plan  Fall after standing from bed, likely mechanical in the setting of generalized weakness and hypoxia versus a component of orthostatic hypotension  No associated loss of consciousness or seizure activity  CT head on admission without acute intracranial abnormality    Plan:   - Ortho consult- no intervention needed for hip fracture  - Fall precautions  - PT/OT  - orthostatic vitals       VTE Assessment: Pad[place]    VTE Prophylaxis: Current anticoagulants:    •None      Code Status: Full Code  Estimated discharge date: 8/6/2023     ATTENDING DOCUMENTATION  ALSO SEE ATTENDING ATTESTATION  SECTION OF NOTE

## 2023-08-07 NOTE — PLAN OF CARE
Problem: Adult Inpatient Plan of Care  Goal: Plan of Care Review  Outcome: Progressing  Flowsheets (Taken 8/7/2023 1030)  Progress: improving  Outcome Evaluation: VSS. Pt to IR for thoracentesis, 1.2L removed and sent for cx. Tolerated well.  Plan of Care Reviewed With: patient  Goal: Patient-Specific Goal (Individualized)  Outcome: Progressing  Goal: Absence of Hospital-Acquired Illness or Injury  Outcome: Progressing  Goal: Optimal Comfort and Wellbeing  Outcome: Progressing  Goal: Readiness for Transition of Care  Outcome: Progressing     Problem: Fall Injury Risk  Goal: Absence of Fall and Fall-Related Injury  Outcome: Progressing     Problem: Skin Injury Risk Increased  Goal: Skin Health and Integrity  Outcome: Progressing     Problem: Mobility Impairment  Goal: Optimal Mobility  Outcome: Progressing     Problem: Self-Care Deficit  Goal: Improved Ability to Complete Activities of Daily Living  Outcome: Progressing     Problem: Heart Failure  Goal: Optimal Coping  Outcome: Progressing  Goal: Optimal Cardiac Output  Outcome: Progressing  Goal: Stable Heart Rate and Rhythm  Outcome: Progressing  Goal: Optimal Functional Ability  Outcome: Progressing  Goal: Fluid and Electrolyte Balance  Outcome: Progressing  Goal: Improved Oral Intake  Outcome: Progressing  Goal: Effective Oxygenation and Ventilation  Outcome: Progressing  Goal: Effective Breathing Pattern During Sleep  Outcome: Progressing     Problem: Mobility Impairment  Goal: Optimal Mobility [place] and Safety  Outcome: Progressing

## 2023-08-07 NOTE — PROGRESS NOTES
[facility] referral- spoke to patient's son Marko. Reviewed [facility] services (RN,PT,OT). Agreeable to services. Referral completed..

## 2023-08-08 ENCOUNTER — APPOINTMENT (INPATIENT)
Dept: RADIOLOGY | Facility: HOSPITAL | Age: 88
DRG: 291 | End: 2023-08-08
Payer: MEDICARE

## 2023-08-08 LAB
ANION GAP SERPL CALC-SCNC: 8 MEQ/L (ref 3–15)
ATRIAL RATE: 107
ATRIAL RATE: 82
BUN SERPL-MCNC: 40 MG/DL (ref 7–25)
CALCIUM SERPL-MCNC: 8.2 MG/DL (ref 8.6–10.3)
CHLORIDE SERPL-SCNC: 103 MEQ/L (ref 98–107)
CO2 SERPL-SCNC: 25 MEQ/L (ref 21–31)
CREAT SERPL-MCNC: 1.4 MG/DL (ref 0.7–1.3)
ERYTHROCYTE [DISTWIDTH] IN BLOOD BY AUTOMATED COUNT: 16.1 % (ref 11.6–14.4)
FUNGUS STAIN: NORMAL
GFR SERPL CREATININE-BSD FRML MDRD: 47.5 ML/MIN/1.73M*2
GLUCOSE SERPL-MCNC: 118 MG/DL (ref 70–99)
GRAM STN SPEC: NORMAL
GRAM STN SPEC: NORMAL
HCT VFR BLDCO AUTO: 30.4 % (ref 40.1–51)
HGB BLD-MCNC: 9.8 G/DL (ref 13.7–17.5)
MAGNESIUM SERPL-MCNC: 1.9 MG/DL (ref 1.8–2.5)
MCH RBC QN AUTO: 32.1 PG (ref 28–33.2)
MCHC RBC AUTO-ENTMCNC: 32.2 G/DL (ref 32.2–36.5)
MCV RBC AUTO: 99.7 FL (ref 83–98)
MICROORGANISM SPEC CULT: NORMAL
PDW BLD AUTO: 10.9 FL (ref 9.4–12.4)
PLATELET # BLD AUTO: 120 K/UL (ref 150–350)
POTASSIUM SERPL-SCNC: 4.3 MEQ/L (ref 3.5–5.1)
QRS DURATION: 172
QRS DURATION: 178
QT INTERVAL: 454
QT INTERVAL: 458
QTC CALCULATION(BAZETT): 504
QTC CALCULATION(BAZETT): 506
R AXIS: -1
R AXIS: 87
RBC # BLD AUTO: 3.05 M/UL (ref 4.5–5.8)
RHODAMINE-AURAMINE STN SPEC: NORMAL
SODIUM SERPL-SCNC: 136 MEQ/L (ref 136–145)
T WAVE AXIS: -10
T WAVE AXIS: 33
VENTRICULAR RATE: 73
VENTRICULAR RATE: 75
WBC # BLD AUTO: 5.65 K/UL (ref 3.8–10.5)

## 2023-08-08 PROCEDURE — 97530 THERAPEUTIC ACTIVITIES: CPT | Mod: GP

## 2023-08-08 PROCEDURE — 97535 SELF CARE MNGMENT TRAINING: CPT | Mod: GO

## 2023-08-08 PROCEDURE — 21400000 HC ROOM AND CARE CCU/INTERMEDIATE

## 2023-08-08 PROCEDURE — 99232 SBSQ HOSP IP/OBS MODERATE 35: CPT | Performed by: HOSPITALIST

## 2023-08-08 PROCEDURE — 82310 ASSAY OF CALCIUM: CPT

## 2023-08-08 PROCEDURE — 93005 ELECTROCARDIOGRAM TRACING: CPT | Performed by: HOSPITALIST

## 2023-08-08 PROCEDURE — 93010 ELECTROCARDIOGRAM REPORT: CPT | Performed by: INTERNAL MEDICINE

## 2023-08-08 PROCEDURE — 73560 X-RAY EXAM OF KNEE 1 OR 2: CPT | Mod: RT

## 2023-08-08 PROCEDURE — 63700000 HC SELF-ADMINISTRABLE DRUG

## 2023-08-08 PROCEDURE — 93010 ELECTROCARDIOGRAM REPORT: CPT | Mod: 76 | Performed by: INTERNAL MEDICINE

## 2023-08-08 PROCEDURE — 36415 COLL VENOUS BLD VENIPUNCTURE: CPT

## 2023-08-08 PROCEDURE — G1004 CDSM NDSC: HCPCS

## 2023-08-08 PROCEDURE — 83735 ASSAY OF MAGNESIUM: CPT

## 2023-08-08 PROCEDURE — 73700 CT LOWER EXTREMITY W/O DYE: CPT | Mod: RT,MG

## 2023-08-08 PROCEDURE — 85027 COMPLETE CBC AUTOMATED: CPT

## 2023-08-08 PROCEDURE — 99232 SBSQ HOSP IP/OBS MODERATE 35: CPT | Performed by: INTERNAL MEDICINE

## 2023-08-08 RX ORDER — LIDOCAINE 560 MG/1
1 PATCH PERCUTANEOUS; TOPICAL; TRANSDERMAL DAILY
Status: DISCONTINUED | OUTPATIENT
Start: 2023-08-08 | End: 2023-08-09 | Stop reason: HOSPADM

## 2023-08-08 RX ORDER — ATORVASTATIN CALCIUM 40 MG/1
40 TABLET, FILM COATED ORAL
Status: DISCONTINUED | OUTPATIENT
Start: 2023-08-08 | End: 2023-08-09 | Stop reason: HOSPADM

## 2023-08-08 RX ORDER — CALCIUM CARBONATE 200(500)MG
500 TABLET,CHEWABLE ORAL 2 TIMES DAILY PRN
Status: DISCONTINUED | OUTPATIENT
Start: 2023-08-08 | End: 2023-08-09 | Stop reason: HOSPADM

## 2023-08-08 RX ORDER — THIAMINE HCL 100 MG
100 TABLET ORAL DAILY
Status: DISCONTINUED | OUTPATIENT
Start: 2023-08-08 | End: 2023-08-09 | Stop reason: HOSPADM

## 2023-08-08 RX ADMIN — THIAMINE HCL TAB 100 MG 100 MG: 100 TAB at 18:42

## 2023-08-08 RX ADMIN — ESCITALOPRAM OXALATE 5 MG: 5 TABLET, FILM COATED ORAL at 09:16

## 2023-08-08 RX ADMIN — LIDOCAINE 1 PATCH: 4 PATCH TOPICAL at 09:16

## 2023-08-08 RX ADMIN — ACETAMINOPHEN 650 MG: 325 TABLET, FILM COATED ORAL at 21:05

## 2023-08-08 RX ADMIN — LIDOCAINE 1 PATCH: 4 PATCH TOPICAL at 09:17

## 2023-08-08 RX ADMIN — LEVOTHYROXINE SODIUM 137 MCG: 0.11 TABLET ORAL at 06:19

## 2023-08-08 RX ADMIN — ATORVASTATIN CALCIUM 40 MG: 40 TABLET, FILM COATED ORAL at 18:42

## 2023-08-08 RX ADMIN — CLOPIDOGREL BISULFATE 75 MG: 75 TABLET ORAL at 09:16

## 2023-08-08 ASSESSMENT — COGNITIVE AND FUNCTIONAL STATUS - GENERAL
MOVING TO AND FROM BED TO CHAIR: 2 - A LOT
HELP NEEDED FOR BATHING: 2 - A LOT
HELP NEEDED FOR PERSONAL GROOMING: 3 - A LITTLE
DRESSING REGULAR UPPER BODY CLOTHING: 3 - A LITTLE
TOILETING: 2 - A LOT
WALKING IN HOSPITAL ROOM: 2 - A LOT
MOVING TO AND FROM BED TO CHAIR: 2 - A LOT
STANDING UP FROM CHAIR USING ARMS: 2 - A LOT
CLIMB 3 TO 5 STEPS WITH RAILING: 1 - TOTAL
AFFECT: WFL
DRESSING REGULAR LOWER BODY CLOTHING: 2 - A LOT
WALKING IN HOSPITAL ROOM: 2 - A LOT
CLIMB 3 TO 5 STEPS WITH RAILING: 2 - A LOT
STANDING UP FROM CHAIR USING ARMS: 2 - A LOT
EATING MEALS: 3 - A LITTLE
CLIMB 3 TO 5 STEPS WITH RAILING: 2 - A LOT
WALKING IN HOSPITAL ROOM: 2 - A LOT
MOVING TO AND FROM BED TO CHAIR: 2 - A LOT
AFFECT: WFL
STANDING UP FROM CHAIR USING ARMS: 2 - A LOT

## 2023-08-08 NOTE — PROGRESS NOTES
CARDIOLOGY FOLLOW UP NOTE    Subjective     Reason for consult  Concern for RV Failure    History of Present Illness  Samy Glass is a 89 y.o. year old male with a past medical history of ischemic cardiomyopathy EF most recently of 40% s/p CABG in 1996, biventricular heart failure, chronic atrial fibrillation, dyslipidemia, history of Justin's gangrene, hypertension, hx of GI bleed who presents with an episode of weakness. Has been hypotensive.     Patient seen and examined at the bedside this morning.  He had 1.2 L drained from his left lung yesterday.  Blood pressures start to improve but states that he felt little weak when getting to the chair and had some concern his right leg.  Denies any lightheadedness, dizziness, chest pain.  Notes some and cough after his thoracentesis yesterday but overall states he feels well.    Review of Systems  A complete 14-point review of systems is negative, except as noted in the HPI.    Past History  No past medical history on file.  No past surgical history on file.  No family history on file.  Social History     Tobacco Use   • Smoking status: Not on file   • Smokeless tobacco: Not on file   Substance Use Topics   • Alcohol use: Not on file       Allergies  No Known Allergies    Home Medications  Current Outpatient Medications   Medication Instructions   • ascorbic acid (VITAMIN C) 500 mg, oral, Daily   • calcium carbonate (TUMS) 200 mg calcium (500 mg) chewable tablet 1 tablet, oral, 2 times daily   • ciprofloxacin (CIPRO) 250 mg, oral, 2 times daily   • clopidogreL (PLAVIX) 75 mg, oral, Daily   • cyanocobalamin (VITAMIN B12) 500 mcg, oral, Daily   • escitalopram (LEXAPRO) 5 mg, oral, Daily   • levothyroxine (SYNTHROID) 137 mcg, oral, Daily (6:30a)   • magnesium oxide (MAG-OX) 400 mg, oral, 2 times daily   • melatonin 3 mg, oral, Nightly   • metoprolol succinate XL (TOPROL-XL) 25 mg, oral, Daily   • multivitamin tablet 1 tablet, oral, Daily   • omeprazole OTC  "(PRILOSEC OTC) 20 mg, oral, Daily before breakfast   • potassium chloride (KLOR-CON) 10 mEq CR tablet 20 mEq, oral, 2 times daily   • sacubitriL-valsartan (ENTRESTO) 24-26 mg per tablet 1 tablet, oral, 2 times daily   • thiamine 100 mg, oral, Daily   • torsemide (DEMADEX) 40 mg, oral, Every other day        Hospital Medications  • clopidogreL  75 mg oral Daily   • escitalopram  5 mg oral Daily   • levothyroxine  137 mcg oral Daily (6:30a)   • lidocaine  1 patch Topical Daily   • lidocaine  1 patch Topical Daily   • [Provider Managed Hold] metoprolol succinate XL  25 mg oral Daily   • [Provider Managed Hold] sacubitriL-valsartan  2 tablet oral BID   • [Provider Managed Hold] torsemide  20 mg oral BID       Objective     Physical Exam  Vitals:    08/08/23 0812   BP: (!) 92/49   Pulse: 78   Resp:    Temp:    SpO2: 95%      Ht Readings from Last 1 Encounters:   08/04/23 1.854 m (6' 1\")      Wt Readings from Last 3 Encounters:   08/08/23 74.8 kg (165 lb)    Body mass index is 21.77 kg/m².    Intake/Output Summary (Last 24 hours) at 8/8/2023 1029  Last data filed at 8/8/2023 0845  Gross per 24 hour   Intake 360 ml   Output 500 ml   Net -140 ml       Physical Exam  Constitutional:       General: He is not in acute distress.     Comments: Appropriate and conversant   HENT:      Head:      Comments: Pt with bruising scattered along his forehead     Right Ear: External ear normal.      Left Ear: External ear normal.      Mouth/Throat:      Mouth: Mucous membranes are moist.   Eyes:      Pupils: Pupils are equal, round, and reactive to light.   Neck:      Comments: JVP elevated in the setting of TR, base at the level of the clavicle which is stable  Cardiovascular:      Rate and Rhythm: Normal rate. Rhythm irregular.      Pulses: Normal pulses.   Pulmonary:      Effort: Pulmonary effort is normal.      Breath sounds: No wheezing.      Comments: Decreased over the left chest wall but improved after thora  Abdominal:      " General: Abdomen is flat. There is no distension.   Musculoskeletal:      Right lower leg: No edema.      Left lower leg: No edema.   Skin:     General: Skin is warm.      Capillary Refill: Capillary refill takes less than 2 seconds.      Coloration: Skin is pale.   Neurological:      General: No focal deficit present.      Mental Status: He is alert and oriented to person, place, and time.   Psychiatric:         Mood and Affect: Mood normal.         Behavior: Behavior normal.       Relevant data reviewed:    ECG: Atrial fibrillation   Right bundle branch block   Abnormal ECG   No previous ECGs available   Confirmed by Nubia Rincon ([id number]) on 8/3/2023 5:08:51 PM    Telemetry: atrial fibrillation seen    Labs  Lab Results   Component Value Date    WBC 5.65 08/08/2023    HGB 9.8 (L) 08/08/2023     (L) 08/08/2023    CHOL 105 08/04/2023    TRIG 89 08/04/2023    HDL 27 (L) 08/04/2023    LDLCALC 60 08/04/2023    ALT 10 08/04/2023    AST 35 08/04/2023     08/08/2023    K 4.3 08/08/2023    CREATININE 1.4 (H) 08/08/2023    TSH 0.09 (L) 08/03/2023    INR 1.4 08/03/2023    HGBA1C 6.4 (H) 08/03/2023         Assessment & Plan     1. Acute on Chronic Biventricular Heart Failure  Patient presenting with some clinical information that appears to be volume overloaded with modestly elevated BNP as well as bilateral pleural effusions. On examination at this time patient appears euvolemic    TTE unchanged from prior.    -Continuing to hold low-dose GDMT at this time given lower blood pressures.  Blood pressure is now improved and this morning. Could slowly inititiate low dose beta blocker in next 24 hours or so pending improvement in orthostatics.  -Would continue to hold diuretic for now; could give small amount of judicious fluid given that still with some lower blood pressures with standing; would try to hold off on midodrine for now.  - Continue to obtain standing daily weights if possible.  Patient notes that his dry  weight seems to be around 165 pounds.  - Please ambulate patient as much as possible as staying in bed will further exacerbate his weakness and hypotension.  - Do not suspect that pleural effusions are completely explained by pts current volume status.      2. CAD s/p CABG  Patient with history of CAD status post CABG in 1996.  Maintained on Plavix.  He is on statin as an outpatient and can continue while inpatient    3. Fall  It does not appear the patient's cardiac history has played a significant role in this patient's fall as he relays no prodromal symptoms and states that his legs just gave out.  In a 89-year-old gentleman our concern would be some level of orthostasis with his blood pressure running low as well as with difficulties in managing his fluid status.  Suspect that there is orthostasis and weakness involved.    4. Atrial Fibrillation  History of chronic atrial fibrillation maintained on beta-blocker.  Beta-blocker currently on hold in the setting of hypotension.  Patient not on anticoagulation at this time given GI bleeding.  We will continue to follow-up with regards to appropriateness of Watchman procedure as an outpatient. This conversation has already taken place as an outpatient.  Pending orthostatic improvement will look to restart low dose beta blocker.      Eric Gilbert, DO

## 2023-08-08 NOTE — PROGRESS NOTES
Physical Therapy -  Daily Treatment/Progress Note     Patient: Samy Glass  Location: [facility] [address] [address]  MRN: [id number]  Today's date: 8/8/2023    HISTORY OF PRESENT ILLNESS     Samy is a 89 y.o. male admitted on 8/3/2023 with Acute on chronic systolic (congestive) heart failure (CMS/HCC) [I50.23]. Principal problem is Acute on chronic systolic (congestive) heart failure (CMS/HCC).    Past Medical History  Samy has no past medical history on file.    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, minimally displaced right inferior pubic rami fracture, large L pleural effusion  moderate T4 and L3 compression fractures, age indeterminate  s/p 8/4 US guided L thoracentesis    8/7: s/p L thoracentesis    PRIOR LEVEL OF FUNCTION AND LIVING ENVIRONMENT     Prior Level of Function    Flowsheet Row Most Recent Value   Ambulation assistive person   Transferring assistive person   Toileting assistive person   Bathing assistive person   Dressing assistive person   Eating independent   IADLs assistive person   Driving/Transportation friends/family   Communication understands/communicates without difficulty   Prior Level of Function Comment MinAx1 at baseline, ambulates with RW household distances. mostly sits in high back chair or w/c   Assistive Device Currently Used at Home walker, front-wheeled, wheelchair, hospital bed, grab bar        Prior Living Environment    Flowsheet Row Most Recent Value   People in Home other (see comments)   Current Living Arrangements home   Home Accessibility ramp to enter home   Living Environment Comment home with ramp entry        VITALS AND PAIN     PT Vitals     Date/Time Pulse HR Source SpO2 Pt Activity O2 Therapy BP BP Location BP Method Pt Position Observations [facility]   08/08/23 0754 71 Monitor 95 % At rest None (Room air) 114/59 Left upper arm Automatic Lying -- RM   08/08/23 0758 -- -- -- -- -- 98/54 -- -- -- seated EOB RM   08/08/23 0801 -- -- -- -- -- 96/53 -- -- -- sitting EOB, ankle pumps RM   08/08/23 0805 -- -- -- -- -- 103/56 -- -- -- standing RM   08/08/23 0812 78 Monitor 95 % At rest None (Room air) 92/49 Left upper arm Automatic Sitting post transfer RM      PT Pain    Date/Time Pain Type Side/Orientation Location Rating: Rest Rating: Activity Interventions [facility]   08/08/23 0754 Pain Assessment right leg 0 - no pain 4 - moderate pain position adjusted RM        Objective   OBJECTIVE     Start time:  0754  End time:  0814  Session Length: 20 min  Mode of Treatment: physical therapy, co-treatment (medical complexity, need for 2 skilled clinicians)    General Observations  Patient received supine, in bed. He was no issues or concerns identified by nurse prior to session, agreeable to therapy. received in bed agreeable to mobility to sit up for breakfast; pt wearing BLE TEDs    Precautions: fall, weight bearing  Extremity Weight-Bearing Status: right lower extremity, right upper extremity  Right UE Weight-Bearing Status: weight-bearing as tolerated (WBAT)  Right LE Weight-Bearing Status: weight-bearing as tolerated (WBAT)    Limitations/Impairments: safety/cognitive, hearing      PT Eval and Treat - 08/08/23 0754        Cognition    Orientation Status oriented x 4;verbal cues/prompts needed for orientation   disoriented to day of the week and benefits from reorientation to date board. Recalls month and year.    Affect/Mental Status WFL     Follows Commands follows two-step commands;over 90% accuracy;verbal cues/prompting required;physical/tactile prompts required;repetition of directions required;increased processing time needed     Comment, Cognition Pt very  pleasant and cooperative, conversational when engaged.        Motor Skills    Functional Endurance improved tolerance to upright posture this session        Bed Mobility    Bed Mobility Activities supine to sit;left     Finney minimum assist (75% or more patient effort);2 person assist     Safety/Cues sequencing;technique     Assistive Device bed rails;head of bed elevated;draw sheet     Comment OOB to pt's L with increased time and verbal cues to sequence. Requires assistance for RLE mobility to EOB due to pain/weakness. requires Robbi to maintain static seated balance at EOB due to posterior lean, progressing to close supervision.        Mobility Belt    Mobility Belt Used for All Out of Bed Activity no     Reason Mobility Belt Not Used medical contraindication     Reason Mobility Belt Not Used IR 8/7        Sit/Stand Transfer    Surface elevated bed     Finney minimum assist (75% or more patient effort);2 person assist     Safety/Cues increased time to complete;hand placement;sequencing;preparatory posture     Assistive Device walker, front-wheeled     Transfer Comments Stands from EOB with VC for widened WILMAR and increased anterior WS. Pt requires Robbi to steady in standing, progressing to CG. Denies dizziness and BP stable. Demonstrates RLE buckling activity with static standing and attempted stand step transfer, therefore transitioned to stand pivot transfer due to safety concerns.        Surface-to-Surface Transfers    Transfer Location bed to chair     Transfer Technique stand pivot     Finney moderate assist (50-74% patient effort);2 person assist     Safety/Cues preparatory posture;technique;sequencing     Assistive Device --   BUE support    Transfer Comments stand pivot transfer to pt's L with modAx2. pt rotates hips and feet on floor. VSS post transfer        Gait Training    Comment (Gait/Stairs) Unable to take steps despite R knee blocking, assistance for weight shifting and min-modAx2  to steady.        Balance    Static Sitting Balance WFL;sitting, edge of bed     Dynamic Sitting Balance mild impairment;sitting, edge of bed;supported     Static Standing Balance mild impairment;supported;standing     Dynamic Standing Balance moderate impairment;supported;standing     Comment, Balance Mild posterior lean upon initial seated and standing postures, able to improve stability with verbal/tactile cues for weight shifting.        Lower Extremity (Therapeutic Exercise)    Comment 20x ankle pumps as dynamic warmup in supine prior to mobility; cued for 10x ankle pumps and RLE LAQ seated EOB to alleviate dizziness        Impairments/Safety Issues    Impairments Affecting Function balance;endurance/activity tolerance;pain;strength;range of motion (ROM)     Safety Issues Affecting Function insight into deficits/self-awareness;sequencing abilities     Comment, Safety Issues/Impairments VC/TC throughout to sequence functional tasks.                               Education Documentation  Joint Mobility/Strength, taught by Isabel Mtz, PT at 8/8/2023  8:22 AM.  Learner: Patient  Readiness: Acceptance  Method: Explanation  Response: Verbalizes Understanding  Comment: safe mobility techniques, LE therex, fall risk        Session Outcome  Patient upright at end of session, chair alarm on, all needs met, personal items in reach, call light in reach. Nursing notified about patient's performance, patient's position, patient's response to therapy/activity, and change in vital signs.    AM-PAC™ - Mobility (Current Function)     Turning form your back to your side while in flat bed without using bedrails 3 - A Little   Moving from lying on your back to sitting on the side of a flat bed without using bedrails 3 - A Little   Moving to and from a bed to a chair 2 - A Lot   Standing up from a chair using your arms 2 - A Lot   To walk in a hospital room 2 - A Lot   Climbing 3-5 steps with a railing 1 - Total   AM-PAC™  Mobility Score 13      ASSESSMENT AND PLAN     Progress Summary  Pt seen for follow up PT session and tolerates increased mobility this session with BP stable throughout transitional movements. He requires minAx2 for bed mobility and sit<>stand. RLE pain limiting stance stability and weight acceptance. pt unable to take steps with RW and completes stand pivot transfer with modAx2. PT to follow to optimize functional abilities prior to dc home with family support and home PT.    Patient/Family Therapy Goals Statement: to get stronger    PT Plan    Flowsheet Row Most Recent Value   Rehab Potential good, to achieve stated therapy goals at 08/08/2023 0754   Therapy Frequency 5 times/wk at 08/08/2023 0754   Planned Therapy Interventions balance training, bed mobility training, home exercise program, gait training, patient/family education, transfer training, strengthening at 08/08/2023 0754          PT Discharge Recommendations    Flowsheet Row Most Recent Value   PT Recommended Discharge Disposition home with assistance, home with home health at 08/08/2023 0754   Anticipated Equipment Needs at Discharge (PT) none  [has RW, w/c] at 08/08/2023 0754               PT Goals    Flowsheet Row Most Recent Value   Bed Mobility Goal 1    Activity/Assistive Device bed mobility activities, all at 08/06/2023 1133   Cobb minimum assist (75% or more patient effort) at 08/06/2023 1133   Time Frame short-term goal (STG) at 08/06/2023 1133   Progress/Outcome goal ongoing at 08/06/2023 1133   Transfer Goal 1    Activity/Assistive Device all transfers at 08/06/2023 1133   Cobb minimum assist (75% or more patient effort) at 08/06/2023 1133   Time Frame short-term goal (STG) at 08/06/2023 1133   Progress/Outcome goal ongoing at 08/06/2023 1133   Gait Training Goal 1    Activity/Assistive Device gait (walking locomotion) at 08/06/2023 1133   Cobb minimum assist (75% or more patient effort) at 08/06/2023 1133   Distance  50 at 08/06/2023 1133   Time Frame short-term goal (STG) at 08/06/2023 1133   Progress/Outcome goal ongoing at 08/06/2023 1130

## 2023-08-08 NOTE — PLAN OF CARE
Pt difficult to move from chair that PT placed him into. Supported <10% of weight moving back to bed.   Neuro: Continues at baseline.  Pain: Managed w/ prn/scheduled meds (see MAR). Discomfort managed non-pharmaceutically.   CV: Continues at baseline  Respiratory: Pt continues at his baseline  GI:Pt's ostomy w/n defined limits. No s/s of infection, malfunction, or distress.   : Pt continues at baseline  Safety precautions remain in place per policy.  Will continue to monitor.       Problem: Adult Inpatient Plan of Care  Goal: Plan of Care Review  Outcome: Progressing  Goal: Patient-Specific Goal (Individualized)  Outcome: Progressing  Goal: Absence of Hospital-Acquired Illness or Injury  Outcome: Progressing  Goal: Optimal Comfort and Wellbeing  Outcome: Progressing

## 2023-08-08 NOTE — PLAN OF CARE
Care Coordination Discharge Plan Note     Discharge Needs Assessment  Concerns to be Addressed: care coordination/care conferences, discharge planning, adjustment to diagnosis/illness  Current Discharge Risk: psychiatric illness, physical impairment    Anticipated Discharge Plan  Anticipated Discharge Disposition: home with home health  Type of Home Care Services: home PT, home OT, nursing        Patient Choice  Offered/Gave Vendor List: yes  Patient's Choice of Community Agency(s): [facility]    Patient and/or patient guardian/advocate was made aware of their right to choose a provider. A list of eligible providers was presented and reviewed with the patient and/or patient guardian/advocate in written and/or verbal form. The list delineates providers in the patient’s desired geographic area who are participating in the Medicare program and/or providers contracted with the patient’s primary insurance. The Medicare list and quality ratings were obtained from the Medicare.gov [medicare.gov] website.    ---------------------------------------------------------------------------------------------------------------------    Interdisciplinary Discharge Plan Review:  Participants:     Concerns Comments: Per Y[facility], 24 [facility]'s will be inplace tomorrow by 1pm, [facility] scheduled for 1pm. [facility] following. Spoke the patient son Marko and he is agreeable to the dispo plan. CC will continue to follow for transition of care needs until discharge is complete.    Discharge Plan:   Disposition/Destination: Home Health Care - [facility] / Home  Discharge Facility:    Community Resources:      Discharge Transportation:  Is Out of Hospital DNR needed at Discharge: no  Does patient need discharge transport? Yes  Discharge Transportation Vendor: [facility] ([phone], option 5)  Type of Transportation: basic life support  What day is the transport expected?: 08/09/23  What time is the transport expected?: 1300

## 2023-08-08 NOTE — PROGRESS NOTES
Internal Medicine  Daily Progress Note       SUBJECTIVE   This is a 89 y.o. year-old male admitted on 8/3/2023 with Acute on chronic systolic (congestive) heart failure (CMS/HCC) [I50.23].    Interval History: NAEON. Patient completed PT this morning. BP is at 131/56 and denies any dizziness when rising from a sitting position.      OBJECTIVE      Vital signs in last 24 hours:  Temp:  [36.3 °C (97.4 °F)-37.1 °C (98.7 °F)] 36.8 °C (98.2 °F)  Heart Rate:  [71-95] 78  Resp:  [14-18] 16  BP: ()/(49-72) 92/49  Temp (24hrs), Av.8 °C (98.2 °F), Min:36.3 °C (97.4 °F), Max:37.1 °C (98.7 °F)    Intake/Output     Intake Evening 23 1500 - 23 Night 23 - 23 Day 23 - 23 1459 Output Evening 23 1500 - 23 Night 23 - 23 Day 23 - 23 1459    P.O. -- -- -- Urine -- -- --    I.V. -- -- -- Stool 200 300 --    IV Piggyback -- -- --                    Total -- -- -- Total 200 300 --   Last 3 shifts --  Intake: 0       Output: 500       Net: -500         Weights (last 5 days)     Date/Time Weight    23 06 74.8 kg (165 lb)    23 06 77.2 kg (170 lb 3.1 oz)    23 0500 76.9 kg (169 lb 8.5 oz)    23 0533 76.2 kg (167 lb 15.9 oz)    23 161 75.8 kg (167 lb)    23 06 76 kg (167 lb 8.8 oz)    23 165 77.1 kg (169 lb 15.6 oz)          PHYSICAL EXAMINATION      Physical Exam  Constitutional:       Appearance: He is normal weight.   Cardiovascular:      Rate and Rhythm: Normal rate and regular rhythm.      Pulses: Normal pulses.      Heart sounds: Normal heart sounds.   Pulmonary:      Breath sounds: Examination of the left-middle field reveals decreased breath sounds. Examination of the left-lower field reveals decreased breath sounds. Decreased breath sounds present.      Comments: Diminished but improved  Abdominal:      General: Abdomen is flat.   Skin:     General: Skin is  warm and dry.      Capillary Refill: Capillary refill takes less than 2 seconds.   Neurological:      Mental Status: He is alert and oriented to person, place, and time. Mental status is at baseline.   Psychiatric:         Mood and Affect: Mood normal.         Thought Content: Thought content normal.         Judgment: Judgment normal.          LINES, CATHETERS, DRAINS, AIRWAYS, AND WOUNDS   Lines, Drains, Airways, Wounds:  Peripheral IV (Adult) 08/03/23 Anterior;Left Forearm (Active)   Number of days: 5       Colostomy LLQ (Active)   Number of days: 5       External Urinary Catheter (Active)   Number of days: 5       Comments:      LABS / IMAGING / TELE      Labs  Recent Labs   Lab 08/08/23  0543 08/07/23  0602 08/06/23  0543    135* 137   K 4.3 3.9 3.9   CO2 25 22 25   BUN 40* 41* 45*   CREATININE 1.4* 1.3 1.7*   GLUCOSE 118* 136* 123*   CALCIUM 8.2* 8.2* 7.9*     Results from last 7 days   Lab Units 08/08/23  0543 08/07/23  0602 08/06/23  0543   MAGNESIUM mg/dL 1.9 1.8 1.9     Results from last 7 days   Lab Units 08/08/23  0543 08/04/23  0614 08/03/23  0832   WBC K/uL 5.65   < > 5.21   HEMOGLOBIN g/dL 9.8*   < > 11.5*   HEMATOCRIT % 30.4*   < > 36.3*   PLATELETS K/uL 120*   < > 145*   DIFF TYPE   --   --  Auto   NRBC %  --   --  0.0   IMM GRANULOCYTES %  --   --  1.2   NEUTROPHILS %  --   --  68.5   LYMPHOCYTES %  --   --  18.0   MONOCYTES %  --   --  9.0   EOSINOPHILS %  --   --  2.7   BASOPHILS %  --   --  0.6   IMM GRANUCOCYTES ABS K/uL  --   --  0.06   MONO ABS AUTO K/uL  --   --  0.47   EOS ABS AUTO K/uL  --   --  0.14   BASO ABS AUTO K/uL  --   --  0.03    < > = values in this interval not displayed.             Imaging personally reviewed(does not include unread studies):  IR THORACENTESIS    Result Date: 8/7/2023  IMPRESSION: Successful ultrasound-guided left-sided thoracentesis with 1.2 L pleural fluid removed and sent to the lab.  All components of the time-out, debriefing, and handling of the  specimen were conducted as per the ASAP Specimen Protocol. I certify that I have personally reviewed this examination and agree with Avril Nava's report. Gigi Berry M.D.    X-RAY CHEST 1 VIEW    Result Date: 8/7/2023  IMPRESSION: No evidence of pneumothorax.    X-RAY CHEST 1 VIEW    Result Date: 8/4/2023  IMPRESSION: No appreciable pneumothorax seen following left thoracentesis. Residual moderately large left pleural effusion and small right pleural effusion with underlying consolidative opacity.     IR THORACENTESIS    Result Date: 8/4/2023  IMPRESSION: Successful ultrasound-guided left-sided thoracentesis with 60 mL pleural fluid removed and sent to the lab.  All components of the time-out, debriefing, and handling of the specimen were conducted as per the ASAP Specimen Protocol. I certify that I have personally reviewed this examination and agree with Avril Nava's report. Constantino Potts MD    X-RAY HIP WITH OR WITHOUT PELVIS 2-3 VW RIGHT    Result Date: 8/3/2023  IMPRESSION: Osteopenia.  Minimally displaced right inferior pubic ramus fracture.     X-RAY SHOULDER RIGHT 2+ VIEWS    Result Date: 8/3/2023  IMPRESSION: No evidence of a right shoulder or humerus fracture. Possible rotator cuff calcific tendinosis. Marked limited evaluation of the right elbow. No evidence of a displaced elbow fracture. Lateral elbow examination can be considered for improved evaluation. COMMENT: 2 views right shoulder performed. Acromioclavicular and glenohumeral alignment are within normal limits for provided images (noting possible superior elevation of the humeral head related to rotator cuff tendinopathy, though this may be due to projectional artifact). No evidence of an acute fracture soft tissue swelling. Imaged portions of the right lung are clear. 2 views right humerus are performed. Possible rotator cuff calcific tendinosis. No evidence of an acute right humerus fracture. Imaged portions of the right lung are clear. No soft  tissue swelling. 2 views of the right elbow performed. No evidence of displaced fracture. Markedly limited examination the absence of lateral projection.    X-RAY HUMERUS RIGHT    Result Date: 8/3/2023  IMPRESSION: No evidence of a right shoulder or humerus fracture. Possible rotator cuff calcific tendinosis. Marked limited evaluation of the right elbow. No evidence of a displaced elbow fracture. Lateral elbow examination can be considered for improved evaluation. COMMENT: 2 views right shoulder performed. Acromioclavicular and glenohumeral alignment are within normal limits for provided images (noting possible superior elevation of the humeral head related to rotator cuff tendinopathy, though this may be due to projectional artifact). No evidence of an acute fracture soft tissue swelling. Imaged portions of the right lung are clear. 2 views right humerus are performed. Possible rotator cuff calcific tendinosis. No evidence of an acute right humerus fracture. Imaged portions of the right lung are clear. No soft tissue swelling. 2 views of the right elbow performed. No evidence of displaced fracture. Markedly limited examination the absence of lateral projection.    X-RAY ELBOW RIGHT 3+ VIEWS    Result Date: 8/3/2023  IMPRESSION: No evidence of a right shoulder or humerus fracture. Possible rotator cuff calcific tendinosis. Marked limited evaluation of the right elbow. No evidence of a displaced elbow fracture. Lateral elbow examination can be considered for improved evaluation. COMMENT: 2 views right shoulder performed. Acromioclavicular and glenohumeral alignment are within normal limits for provided images (noting possible superior elevation of the humeral head related to rotator cuff tendinopathy, though this may be due to projectional artifact). No evidence of an acute fracture soft tissue swelling. Imaged portions of the right lung are clear. 2 views right humerus are performed. Possible rotator cuff calcific  tendinosis. No evidence of an acute right humerus fracture. Imaged portions of the right lung are clear. No soft tissue swelling. 2 views of the right elbow performed. No evidence of displaced fracture. Markedly limited examination the absence of lateral projection.    CT CHEST WITH IV CONTRAST    Result Date: 8/3/2023  IMPRESSION: 1. Large right and small left pleural effusions with adjacent compressive atelectasis and significant left lower lobe volume loss. 2. Cardiomegaly with IVC distention and contrast reflux in keeping with heart failure. 3. 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 4. Moderate T4 and mild L3 compression fractures, age indeterminate.  Clinically correlate.    CT ABDOMEN PELVIS WITH IV CONTRAST    Result Date: 8/3/2023  IMPRESSION: 1. Large right and small left pleural effusions with adjacent compressive atelectasis and significant left lower lobe volume loss. 2. Cardiomegaly with IVC distention and contrast reflux in keeping with heart failure. 3. 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 4. Moderate T4 and mild L3 compression fractures, age indeterminate.  Clinically correlate.    CT HEAD WITHOUT IV CONTRAST    Result Date: 8/3/2023  IMPRESSION:  No acute intracranial abnormality.  Multiple scalp hematomas.  No evidence for acute fracture or traumatic subluxation the cervical spine.  Large left pleural effusion. COMMENT: A standard noncontrast head CT was performed. Noncontrast CT examination of the cervical spine performed following the standard protocol. Sagittal and coronal reformations rendered from axial source images. Images reviewed in bone and soft tissue windows. CT DOSE:  One or more dose reduction techniques (e.g. automated exposure control, adjustment of the mA and/or kV according to patient size, use of iterative reconstruction technique) utilized for this examination. Comparison:  No prior studies are available for  comparison. Findings:  Sulci, ventricles and basal cisterns are prominent within normal limits for patient's age.  Mild periventricular and subcortical white matter hypoattenuation, nonspecific, likely sequela microangiopathic disease.  No acute hemorrhage, acute territorial infarct, or mass effect is seen.  There is no extra-axial fluid collection.  The visualized paranasal sinuses are clear. Mastoid air cells are clear.  Moderate-sized left frontal scalp hematoma.  Small right frontal scalp hematoma. Cervicothoracic alignment: Anatomic. Prevertebral soft tissues: Normal in thickness. Vertebral bodies: Normal in height.  No acute fractures.  Fusion of the posterior elements of C4 on C5. Intervertebral discs: Disc osteophyte complex throughout the cervical spine. Cervical and upper thoracic spinal canal: Patent. Axial images: Skull base: Normal. C1-2: Normal. C2-3: Facet arthrosis, right uncovertebral hypertrophic changes, contributing to mild right foraminal narrowing. C3-4: Disc osteophyte complex with severe right greater than left uncovertebral hypertrophic changes, severe left facet hypertrophy, contributing to moderate bilateral foraminal narrowing. C4-5: Severe left and moderate right facet hypertrophy, contributing to moderate to moderate severe left foraminal narrowing. C5-6: Small disc osteophyte complex with facet arthrosis and left uncovertebral hypertrophic changes, contributing to mild left foraminal narrowing. C6-7: Mild posterior osseous spurring without consequence. C7-T1: Right facet arthrosis without consequence. Other: Large left pleural effusion.  Right apical pleural-parenchymal scarring.     CT CERVICAL SPINE WITHOUT IV CONTRAST    Result Date: 8/3/2023  IMPRESSION:  No acute intracranial abnormality.  Multiple scalp hematomas.  No evidence for acute fracture or traumatic subluxation the cervical spine.  Large left pleural effusion. COMMENT: A standard noncontrast head CT was performed.  Noncontrast CT examination of the cervical spine performed following the standard protocol. Sagittal and coronal reformations rendered from axial source images. Images reviewed in bone and soft tissue windows. CT DOSE:  One or more dose reduction techniques (e.g. automated exposure control, adjustment of the mA and/or kV according to patient size, use of iterative reconstruction technique) utilized for this examination. Comparison:  No prior studies are available for comparison. Findings:  Sulci, ventricles and basal cisterns are prominent within normal limits for patient's age.  Mild periventricular and subcortical white matter hypoattenuation, nonspecific, likely sequela microangiopathic disease.  No acute hemorrhage, acute territorial infarct, or mass effect is seen.  There is no extra-axial fluid collection.  The visualized paranasal sinuses are clear. Mastoid air cells are clear.  Moderate-sized left frontal scalp hematoma.  Small right frontal scalp hematoma. Cervicothoracic alignment: Anatomic. Prevertebral soft tissues: Normal in thickness. Vertebral bodies: Normal in height.  No acute fractures.  Fusion of the posterior elements of C4 on C5. Intervertebral discs: Disc osteophyte complex throughout the cervical spine. Cervical and upper thoracic spinal canal: Patent. Axial images: Skull base: Normal. C1-2: Normal. C2-3: Facet arthrosis, right uncovertebral hypertrophic changes, contributing to mild right foraminal narrowing. C3-4: Disc osteophyte complex with severe right greater than left uncovertebral hypertrophic changes, severe left facet hypertrophy, contributing to moderate bilateral foraminal narrowing. C4-5: Severe left and moderate right facet hypertrophy, contributing to moderate to moderate severe left foraminal narrowing. C5-6: Small disc osteophyte complex with facet arthrosis and left uncovertebral hypertrophic changes, contributing to mild left foraminal narrowing. C6-7: Mild posterior osseous  spurring without consequence. C7-T1: Right facet arthrosis without consequence. Other: Large left pleural effusion.  Right apical pleural-parenchymal scarring.       ECG/Telemetry  I have independently reviewed the telemetry. No events for the last 24 hours.    ASSESSMENT AND PLAN      Pleural effusion, bilateral  Assessment & Plan  Large left and small right pleural effusion on CT chest  Suspect etiology most likely acute CHF due to right-sided heart failure however asymmetric nature raises concern for other etiology (ie, malignancy, paramedic)      - F/U with cytology   - Hold off Thoracentesis d/t low blood pressure. Pulled out 1.2L fluid but still some remaining. Breath sounds improved.  - Fluid protein- 4.7 and Serum protein 6.9. Ratio= .68 .  - Exudative effusion       Orthostatic hypotension  Assessment & Plan  - Noted to be Orthostatic since admission  - Intravascularly depleted.  - Weakness      Plan  - Check orthostatics  - Give b/l compression stockings  - Encourage P.O. 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 Jeffrey at [facility]  Home regimen: Metoprolol 25 mg daily, Entresto, Toremide 40 mg every other day  Dry weight 166 (reported). Weight at 165  Physical exam shows crackles, , CT chest  shows large left and small right pleural effusions    CHF exacerbation likely in the s/o increased fluid intake vs dietary indiscretion vs component of progression of known RV dysfunction due to pulmonary HTN; otherwise low suspicion for ACS as trop plateau'd and ECG without acute ischemic change          - Continue to hold diuresis with oral torsemide , re-dose based on Cr response and UOP  - Hold GDMT given soft BP and decompensated CHF, resume as able  - Cards c/s, preferably CHF/pHTN team  - CHF orderset: strict I&Os, daily standing wt, replete lytes for goal Mg>2 and K>4, cardiac diet with fluid restriction    UTI (urinary tract infection)  Assessment & Plan  Patient with recent UTI being treated with 7-day course of ciprofloxacin as an outpatient (initiated 7/29/2023)  Denies urinary complaints    - Complete Ceftriaxone course  - Switched from Cipro d/t prolonged QTc    Elevated troponin  Assessment & Plan  Minimally elevated on admission, likely demand in the setting of acute CHF  No chest pain and ECG without acute ST-T wave abnormalities    - Repeat ECG and troponin PRN for chest pain    Atrial fibrillation (CMS/HCC)  Assessment & Plan  History of chronic atrial fibrillation on metoprolol succinate 25 mg daily  Previously on Eliquis however does not appear to have tolerated due to diverticular bleeding; also may not be a good candidate due to falls  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    - Resume metoprolol as BP allows pending improvement in decompensated CHF  - Monitor on telemetry, repeat ECG as needed    Hypertension  Assessment & Plan  Home regimen: Metoprolol 25 mg daily    - Hold BP meds given soft Bps, resume as able    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)  Assessment & Plan  CT abdomen/pelvis noted an incidental 3.5 x 3.5 cm internal iliac artery aneurysm that is partially thrombosed  Stable in size from prior CT abdomen/pelvis in 10/2022    - Vascular surgery  consulted-no acute surgical intervention, will follow-up as an outpatient  - Continue supportive management with BP control    CALI (acute kidney injury) (CMS/[facility])  Assessment & Plan  Cr 1.8 on presentation, last 1.6 in 4/2023 with prior baseline ~1.0 from 11/2022  Likely 2/2 cardiorenal syndrome in the setting of acute CHF  S/p 500 cc NSS in ED due to soft BP    - BMP daily   - Encourage P.O. intake  - Avoid nephrotoxins, dose meds renally  - Heparin for DVT prophylaxis    Coronary artery disease  Assessment & Plan  Hx CABG in 1996, subsequent adenosine MPI negative for ischemia in 6/13  On Plavix  No chest pain, ECG without acute ischemic changes, trop plateau'd    - Continue Plavix    Fall  Assessment & Plan  Fall after standing from bed, likely mechanical in the setting of generalized weakness and hypoxia versus a component of orthostatic hypotension  No associated loss of consciousness or seizure activity  CT head on admission without acute intracranial abnormality    Plan:   - Ortho consult- no intervention needed for hip fracture  - Fall precautions  - PT/OT  - orthostatic vitals       VTE Assessment: [place]    VTE Prophylaxis: Current anticoagulants:    •None      Code Status: Full Code  Estimated discharge date: 8/9/2023     ATTENDING DOCUMENTATION  ALSO SEE ATTENDING ATTESTATION SECTION OF NOTE

## 2023-08-08 NOTE — PROGRESS NOTES
Medical update provided by team at bedside to patient and wife. All questions answered.    Tania Browning MD  Internal Medicine, PGY-2  [facility]  Pager #[phone]

## 2023-08-08 NOTE — PROGRESS NOTES
Occupational Therapy -  Daily Treatment/Progress Note     Patient: Samy Glass  Location: [facility] [address] [address]  MRN: [id number]  Today's date: 8/8/2023    HISTORY OF PRESENT ILLNESS     Samy is a 89 y.o. male admitted on 8/3/2023 with Acute on chronic systolic (congestive) heart failure (CMS/HCC) [I50.23]. Principal problem is Acute on chronic systolic (congestive) heart failure (CMS/HCC).    Past Medical History  Samy has no past medical history on file.    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, minimally displaced right inferior pubic rami fracture, large L pleural effusion  moderate T4 and L3 compression fractures, age indeterminate  s/p 8/4 US guided L thoracentesis    8/7: s/p L thoracentesis    PRIOR LEVEL OF FUNCTION AND LIVING ENVIRONMENT     Prior Level of Function    Flowsheet Row Most Recent Value   Ambulation assistive person   Transferring assistive person   Toileting assistive person   Bathing assistive person   Dressing assistive person   Eating independent   IADLs assistive person   Driving/Transportation friends/family   Communication understands/communicates without difficulty   Prior Level of Function Comment MinAx1 at baseline, ambulates with RW household distances. mostly sits in high back chair or w/c   Assistive Device Currently Used at Home walker, front-wheeled, wheelchair, hospital bed, grab bar        Prior Living Environment    Flowsheet Row Most Recent Value   People in Home other (see comments)   Current Living Arrangements home   Home Accessibility ramp to enter home   Living Environment Comment home with ramp entry        Occupational Profile     Flowsheet Row Most Recent Value   Occupational History/Life Experiences retired        VITALS AND PAIN     OT Vitals    Date/Time Pulse HR Source SpO2 Pt Activity O2 Therapy BP BP Location BP Method Pt Position Observations [facility]   08/08/23 0758 -- -- -- -- -- 98/54 -- -- -- seated EOB RM   08/08/23 0801 -- -- -- -- -- 96/53 -- -- -- sitting EOB, ankle pumps RM   08/08/23 0801 72 -- -- -- -- -- -- -- -- -- SD   08/08/23 0805 -- -- -- -- -- 103/56 -- -- -- standing RM   08/08/23 0812 78 Monitor 95 % At rest None (Room air) 92/49 Left upper arm Automatic Sitting post transfer RM      OT Pain    Date/Time Pain Type Rating: Rest Rating: Activity Interventions Sleep/Rest/Relaxation [facility]   08/08/23 0801 Pain Assessment 0 - no pain 4 - moderate pain position adjusted;medication administered no problem identified SD        Objective   OBJECTIVE     Start time:  0756  End time:  0815  Session Length: 19 min  Mode of Treatment: co-treatment, occupational therapy (co-tx d/t medical complexity, for cont assessment for d/c)    General Observations  Patient received reclined, in bed. He was no issues or concerns identified by nurse prior to session, agreeable to therapy. motivated, wearing TEDS to BLEs    Precautions: fall, weight bearing  Extremity Weight-Bearing Status: right lower extremity, right upper extremity  Right UE Weight-Bearing Status: weight-bearing as tolerated (WBAT)  Right LE Weight-Bearing Status: weight-bearing as tolerated (WBAT)    Limitations/Impairments: safety/cognitive, hearing      OT Eval and Treat - 08/08/23 0851        Cognition    Orientation Status oriented x 4;verbal cues/prompts needed for orientation;time     Affect/Mental Status WFL     Follows Commands follows one-step commands;follows two-step commands;over 90% accuracy;delayed response/completion;increased processing time needed;verbal cues/prompting required;repetition of directions required;physical/tactile prompts required;initiation  impaired     Cognitive Function executive function deficit     Executive Function Deficit minimal deficit;organization/sequencing     Comment, Cognition Pt A&O, following commands and motivated to participate in OT. Demo'ing slight decr sequencing during OOB side stepping. endorsing R hip pain impacted difficulty to sequence steps        Bed Mobility    Bed Mobility Activities supine to sit;left     [place] minimum assist (75% or more patient effort);2 person assist     Safety/Cues increased time to complete;minimal;verbal cues;tactile cues;technique     Assistive Device head of bed elevated;bed rails     Comment OOB to L side, orthostatics taken with positional change and ~ 15 point drop -- denies dizziness. Req (A) for BLEs/torso mgmt        Mobility Belt    Mobility Belt Used for All Out of Bed Activity no     Reason Mobility Belt Not Used medical contraindication     Reason Mobility Belt Not Used s/p thoracentesis on 8/4 and 8/7        Sit/Stand Transfer    Surface elevated bed;chair with arm rests     [place] minimum assist (75% or more patient effort);2 person assist     Safety/Cues increased time to complete;minimal;verbal cues;hand placement;technique     Assistive Device walker, front-wheeled;other (see comments)     Transfer Comments from/to elevated EOB, form elevated EOB/to chair. performing 2 sit<>stand. with RW, with HHAx2. posterior lean in standing, req incr time to find WILMAR. BP remaining stable in standing. RLE knee buckling noted.        Surface-to-Surface Transfers    Transfer Location bed to chair     Transfer Technique stand pivot     [place] moderate assist (50-74% patient effort);2 person assist     Safety/Cues increased time to complete;minimal;verbal cues;hand placement;technique;preparatory posture     Assistive Device other (see comments)     Transfer Comments from EOB to chair toward L side with HHAx2. Initially attempting side stepping with RW, however Pt demo'ing  difficutly advancing BLEs. R knee buckling observed, R hip pain endorsed.        Functional Mobility    Distance few steps at bedside     Functional Mobility [place] maximum assist (25-49% patient effort)     Safety/Cues increased time to complete;minimal;verbal cues;hand placement;technique     Assistive Device other (see comments)     Functional Mobility Comments Pt attempting ~ 2 steps bedside. R knee buckling, endorsing R hip pain. req cues for weightshifting and sequencing side steps        Lower Body Dressing    Tasks don;socks     [place] maximum assist (25-49% patient effort)     Position supine     Adaptive Equipment none     Comment in prep for OOB activity        Toileting    Tasks adjust/manage clothing;perform bladder hygiene     [place] dependent (less than 25% patient effort)     Position supported standing     Setup Assistance obtain supplies;change pad/brief     Adaptive Equipment other (see comments)     Comment supported stance within RW bedside.        Self-Feeding    Comment (A) for container mgmt. breakfast tray placed within reach at end of session        Balance    Static Sitting Balance WFL;sitting, edge of bed     Dynamic Sitting Balance mild impairment;sitting, edge of bed     Sit to Stand Dynamic Balance mild impairment;supported     Static Standing Balance mild impairment;supported     Dynamic Standing Balance moderate impairment;supported     Balance Interventions occupation based/functional task     Comment, Balance pt presenting with standing balance deficits. posterior lean noted initially in standing. R knee buckling.        Impairments/Safety Issues    Impairments Affecting Function balance;endurance/activity tolerance;strength     Functional Endurance with incr tolerance this session. fair+     Safety Issues Affecting Function sequencing abilities;insight into deficits/self-awareness        Lower Extremity (Therapeutic Exercise)    Comment see PT note, BLE exercises  performing to maintain BP                                  Session Outcome  Patient upright, in chair at end of session, chair alarm on, all needs met, call light in reach, personal items in reach. Nursing notified about change in vital signs, patient's response to therapy/activity, patient's performance, and patient's position.    AM-PAC™ - ADL (Current Function)     Putting on/taking off regular lower body clothing 2 - A Lot   Bathing 2 - A Lot   Toileting 2 - A Lot   Putting on/taking off regular upper body clothing 3 - A Little   Help for taking care of personal grooming 3 - A Little   Eating meals 3 - A Little   AM-PAC™ ADL Score 15      ASSESSMENT AND PLAN     Progress Summary  OT tx complete. Samy cont to progress toward OT goals. He req Min A x2 for bed mob and sit<>stands, Mod Ax2 for stand pivot transfer from EOB to chair. benefits from HHA, RLE knee buckling observed in standing. With difficulty side stepping this session. DEP for supported standing toilet hygiene, max A LBD. TEDs donned during session and BP remaining WFL. Pt has (A) available at home from ADLs/transfers. Cont to rec home (A) for all OOB activity/transfers and HH OT. Cont with OT POC    Patient/Family Therapy Goal Statement: to go home    OT Plan    Flowsheet Row Most Recent Value   Rehab Potential fair, will monitor progress closely at 08/08/2023 0851   Therapy Frequency 4 times/wk at 08/08/2023 0851   Planned Therapy Interventions activity tolerance training, patient/caregiver education/training, occupation/activity based interventions, strengthening exercise, transfer/mobility retraining, adaptive equipment training, BADL retraining, functional balance retraining, ROM/therapeutic exercise at 08/08/2023 0851          OT Discharge Recommendations    Flowsheet Row Most Recent Value   OT Recommended Discharge Disposition home with assistance, home with home health  [vs SNF if (A) for all OOB activity/transfers is unavailable] at  08/08/2023 0851   Anticipated Equipment Needs At Discharge (OT) shower chair, other (see comments), commode, 3-in-1, wheelchair  [urinal,  owns hosp bed, WC, grab bar] at 08/08/2023 0851               OT Goals    Flowsheet Row Most Recent Value   Bed Mobility Goal 1    Activity/Assistive Device bed mobility activities, all at 08/06/2023 1134   [place] supervision required at 08/06/2023 1134   Time Frame by discharge at 08/06/2023 1134   Transfer Goal 1    Activity/Assistive Device all transfers at 08/06/2023 1134   [place] supervision required at 08/06/2023 1134   Time Frame by discharge at 08/06/2023 1134   Dressing Goal 1    Activity/Adaptive Equipment dressing skills, all at 08/06/2023 1134   [place] supervision required at 08/06/2023 1134   Time Frame by discharge at 08/06/2023 1134   Toileting Goal 1    Activity/Assistive Device toileting skills, all at 08/06/2023 1134   [place] supervision required at 08/06/2023 1134   Time Frame by discharge at 08/06/2023 1134

## 2023-08-09 VITALS
TEMPERATURE: 97.5 F | BODY MASS INDEX: 21.47 KG/M2 | HEIGHT: 73 IN | RESPIRATION RATE: 17 BRPM | WEIGHT: 162 LBS | HEART RATE: 78 BPM | DIASTOLIC BLOOD PRESSURE: 57 MMHG | SYSTOLIC BLOOD PRESSURE: 106 MMHG | OXYGEN SATURATION: 98 %

## 2023-08-09 LAB
ANION GAP SERPL CALC-SCNC: 8 MEQ/L (ref 3–15)
ATRIAL RATE: 75
BUN SERPL-MCNC: 38 MG/DL (ref 7–25)
CALCIUM SERPL-MCNC: 8 MG/DL (ref 8.6–10.3)
CASE RPRT: NORMAL
CHLORIDE SERPL-SCNC: 105 MEQ/L (ref 98–107)
CO2 SERPL-SCNC: 24 MEQ/L (ref 21–31)
CREAT SERPL-MCNC: 1.2 MG/DL (ref 0.7–1.3)
ERYTHROCYTE [DISTWIDTH] IN BLOOD BY AUTOMATED COUNT: 15.9 % (ref 11.6–14.4)
GFR SERPL CREATININE-BSD FRML MDRD: 56.8 ML/MIN/1.73M*2
GLUCOSE SERPL-MCNC: 109 MG/DL (ref 70–99)
HCT VFR BLDCO AUTO: 31 % (ref 40.1–51)
HGB BLD-MCNC: 10.2 G/DL (ref 13.7–17.5)
MAGNESIUM SERPL-MCNC: 1.8 MG/DL (ref 1.8–2.5)
MCH RBC QN AUTO: 33.3 PG (ref 28–33.2)
MCHC RBC AUTO-ENTMCNC: 32.9 G/DL (ref 32.2–36.5)
MCV RBC AUTO: 101.3 FL (ref 83–98)
PATH REPORT.FINAL DX SPEC: NORMAL
PATH REPORT.GROSS SPEC: NORMAL
PDW BLD AUTO: 10.9 FL (ref 9.4–12.4)
PLATELET # BLD AUTO: 121 K/UL (ref 150–350)
POTASSIUM SERPL-SCNC: 4 MEQ/L (ref 3.5–5.1)
QRS DURATION: 180
QT INTERVAL: 452
QTC CALCULATION(BAZETT): 511
R AXIS: 78
RBC # BLD AUTO: 3.06 M/UL (ref 4.5–5.8)
SODIUM SERPL-SCNC: 137 MEQ/L (ref 136–145)
T WAVE AXIS: -20
VENTRICULAR RATE: 77
WBC # BLD AUTO: 5.78 K/UL (ref 3.8–10.5)

## 2023-08-09 PROCEDURE — 36415 COLL VENOUS BLD VENIPUNCTURE: CPT

## 2023-08-09 PROCEDURE — 80048 BASIC METABOLIC PNL TOTAL CA: CPT

## 2023-08-09 PROCEDURE — 63700000 HC SELF-ADMINISTRABLE DRUG

## 2023-08-09 PROCEDURE — 85027 COMPLETE CBC AUTOMATED: CPT

## 2023-08-09 PROCEDURE — 83735 ASSAY OF MAGNESIUM: CPT

## 2023-08-09 PROCEDURE — 93010 ELECTROCARDIOGRAM REPORT: CPT | Performed by: INTERNAL MEDICINE

## 2023-08-09 PROCEDURE — 97535 SELF CARE MNGMENT TRAINING: CPT | Mod: GO

## 2023-08-09 PROCEDURE — 93005 ELECTROCARDIOGRAM TRACING: CPT | Performed by: HOSPITALIST

## 2023-08-09 PROCEDURE — 97530 THERAPEUTIC ACTIVITIES: CPT | Mod: GO

## 2023-08-09 PROCEDURE — 99238 HOSP IP/OBS DSCHRG MGMT 30/<: CPT | Performed by: HOSPITALIST

## 2023-08-09 PROCEDURE — 99232 SBSQ HOSP IP/OBS MODERATE 35: CPT | Performed by: INTERNAL MEDICINE

## 2023-08-09 PROCEDURE — 97530 THERAPEUTIC ACTIVITIES: CPT | Mod: GP,CQ

## 2023-08-09 RX ORDER — ATORVASTATIN CALCIUM 40 MG/1
40 TABLET, FILM COATED ORAL
Qty: 30 TABLET | Refills: 0 | Status: SHIPPED | OUTPATIENT
Start: 2023-08-09 | End: 2023-08-09 | Stop reason: HOSPADM

## 2023-08-09 RX ORDER — LIDOCAINE 560 MG/1
1 PATCH PERCUTANEOUS; TOPICAL; TRANSDERMAL DAILY
Qty: 30 PATCH | Refills: 0 | Status: SHIPPED | OUTPATIENT
Start: 2023-08-10 | End: 2023-11-02 | Stop reason: ALTCHOICE

## 2023-08-09 RX ORDER — TORSEMIDE 10 MG/1
10 TABLET ORAL DAILY
Qty: 30 TABLET | Refills: 0 | Status: SHIPPED | OUTPATIENT
Start: 2023-08-10 | End: 2023-11-29 | Stop reason: SDUPTHER

## 2023-08-09 RX ORDER — TORSEMIDE 20 MG/1
10 TABLET ORAL DAILY
Status: DISCONTINUED | OUTPATIENT
Start: 2023-08-09 | End: 2023-08-09 | Stop reason: HOSPADM

## 2023-08-09 RX ADMIN — CLOPIDOGREL BISULFATE 75 MG: 75 TABLET ORAL at 09:22

## 2023-08-09 RX ADMIN — THIAMINE HCL TAB 100 MG 100 MG: 100 TAB at 09:22

## 2023-08-09 RX ADMIN — TORSEMIDE 10 MG: 20 TABLET ORAL at 13:46

## 2023-08-09 RX ADMIN — ACETAMINOPHEN 650 MG: 325 TABLET, FILM COATED ORAL at 05:45

## 2023-08-09 RX ADMIN — LIDOCAINE 1 PATCH: 4 PATCH TOPICAL at 09:23

## 2023-08-09 RX ADMIN — LEVOTHYROXINE SODIUM 137 MCG: 0.11 TABLET ORAL at 05:45

## 2023-08-09 RX ADMIN — ESCITALOPRAM OXALATE 5 MG: 5 TABLET, FILM COATED ORAL at 09:22

## 2023-08-09 ASSESSMENT — COGNITIVE AND FUNCTIONAL STATUS - GENERAL
TOILETING: 1 - TOTAL
WALKING IN HOSPITAL ROOM: 2 - A LOT
MOVING TO AND FROM BED TO CHAIR: 2 - A LOT
STANDING UP FROM CHAIR USING ARMS: 2 - A LOT
DRESSING REGULAR LOWER BODY CLOTHING: 1 - TOTAL
MOVING TO AND FROM BED TO CHAIR: 2 - A LOT
AFFECT: WFL
CLIMB 3 TO 5 STEPS WITH RAILING: 1 - TOTAL
HELP NEEDED FOR BATHING: 2 - A LOT
AFFECT: WFL
WALKING IN HOSPITAL ROOM: 2 - A LOT
DRESSING REGULAR UPPER BODY CLOTHING: 3 - A LITTLE
HELP NEEDED FOR PERSONAL GROOMING: 3 - A LITTLE
EATING MEALS: 4 - NONE
CLIMB 3 TO 5 STEPS WITH RAILING: 2 - A LOT
STANDING UP FROM CHAIR USING ARMS: 2 - A LOT

## 2023-08-09 NOTE — PROGRESS NOTES
CARDIOLOGY FOLLOW UP NOTE    Subjective     Reason for consult  Concern for RV Failure    History of Present Illness  Samy Glass is a 89 y.o. year old male with a past medical history of ischemic cardiomyopathy EF most recently of 40% s/p CABG in 1996, biventricular heart failure, chronic atrial fibrillation, dyslipidemia, history of Justin's gangrene, hypertension, hx of GI bleed who presents with an episode of weakness. Has been hypotensive.     Pt seen and examined at the bedside this morning.  Overall doing well.  Still with some hip pain, found to have fracture.  Discussing with primary team need for rehab vs home.  Appears euvolemic today.    Review of Systems  A complete 14-point review of systems is negative, except as noted in the HPI.    Past History  No past medical history on file.  No past surgical history on file.  No family history on file.  Social History     Tobacco Use   • Smoking status: Not on file   • Smokeless tobacco: Not on file   Substance Use Topics   • Alcohol use: Not on file       Allergies  No Known Allergies    Home Medications  Current Outpatient Medications   Medication Instructions   • ascorbic acid (VITAMIN C) 500 mg, oral, Daily   • calcium carbonate (TUMS) 200 mg calcium (500 mg) chewable tablet 1 tablet, oral, 2 times daily   • ciprofloxacin (CIPRO) 250 mg, oral, 2 times daily   • clopidogreL (PLAVIX) 75 mg, oral, Daily   • cyanocobalamin (VITAMIN B12) 500 mcg, oral, Daily   • escitalopram (LEXAPRO) 5 mg, oral, Daily   • levothyroxine (SYNTHROID) 137 mcg, oral, Daily (6:30a)   • magnesium oxide (MAG-OX) 400 mg, oral, 2 times daily   • melatonin 3 mg, oral, Nightly   • metoprolol succinate XL (TOPROL-XL) 25 mg, oral, Daily   • multivitamin tablet 1 tablet, oral, Daily   • omeprazole OTC (PRILOSEC OTC) 20 mg, oral, Daily before breakfast   • potassium chloride (KLOR-CON) 10 mEq CR tablet 20 mEq, oral, 2 times daily   • sacubitriL-valsartan (ENTRESTO) 24-26 mg per tablet  "1 tablet, oral, 2 times daily   • thiamine 100 mg, oral, Daily   • torsemide (DEMADEX) 40 mg, oral, Every other day        Hospital Medications  • atorvastatin  40 mg oral Daily (6p)   • clopidogreL  75 mg oral Daily   • escitalopram  5 mg oral Daily   • levothyroxine  137 mcg oral Daily (6:30a)   • lidocaine  1 patch Topical Daily   • lidocaine  1 patch Topical Daily   • [Provider Managed Hold] metoprolol succinate XL  25 mg oral Daily   • [Provider Managed Hold] sacubitriL-valsartan  2 tablet oral BID   • thiamine  100 mg oral Daily   • [Provider Managed Hold] torsemide  20 mg oral BID       Objective     Physical Exam  Vitals:    08/09/23 0300   BP: 110/60   Pulse: 94   Resp:    Temp: 36.9 °C (98.4 °F)   SpO2: 94%      Ht Readings from Last 1 Encounters:   08/04/23 1.854 m (6' 1\")      Wt Readings from Last 3 Encounters:   08/09/23 73.5 kg (162 lb)    Body mass index is 21.37 kg/m².    Intake/Output Summary (Last 24 hours) at 8/9/2023 0655  Last data filed at 8/8/2023 1600  Gross per 24 hour   Intake 480 ml   Output 175 ml   Net 305 ml       Physical Exam  Constitutional:       General: He is not in acute distress.     Comments: Appropriate and conversant   HENT:      Head:      Comments: Pt with bruising scattered along his forehead     Right Ear: External ear normal.      Left Ear: External ear normal.      Mouth/Throat:      Mouth: Mucous membranes are moist.   Eyes:      Pupils: Pupils are equal, round, and reactive to light.   Neck:      Comments: JVP elevated in the setting of TR, base at the level of the clavicle which is stable  Cardiovascular:      Rate and Rhythm: Normal rate. Rhythm irregular.      Pulses: Normal pulses.   Pulmonary:      Effort: Pulmonary effort is normal.      Breath sounds: No wheezing.      Comments: Faint lung crackles on left lung base  Abdominal:      General: Abdomen is flat. There is no distension.   Musculoskeletal:      Right lower leg: No edema.      Left lower leg: No " edema.   Skin:     General: Skin is warm.      Capillary Refill: Capillary refill takes less than 2 seconds.      Coloration: Skin is pale.   Neurological:      General: No focal deficit present.      Mental Status: He is alert and oriented to person, place, and time.   Psychiatric:         Mood and Affect: Mood normal.         Behavior: Behavior normal.       Relevant data reviewed:    ECG: Atrial fibrillation   Right bundle branch block   Abnormal ECG   No previous ECGs available   Confirmed by Nubia Rincon ([id number]) on 8/3/2023 5:08:51 PM    Telemetry: atrial fibrillation seen    Labs  Lab Results   Component Value Date    WBC 5.78 08/09/2023    HGB 10.2 (L) 08/09/2023     (L) 08/09/2023    CHOL 105 08/04/2023    TRIG 89 08/04/2023    HDL 27 (L) 08/04/2023    LDLCALC 60 08/04/2023    ALT 10 08/04/2023    AST 35 08/04/2023     08/09/2023    K 4.0 08/09/2023    CREATININE 1.2 08/09/2023    TSH 0.09 (L) 08/03/2023    INR 1.4 08/03/2023    HGBA1C 6.4 (H) 08/03/2023         Assessment & Plan     1. Acute on Chronic Biventricular Heart Failure  Patient presenting with some clinical information that appears to be volume overloaded with modestly elevated BNP as well as bilateral pleural effusions. On examination at this time patient appears euvolemic    TTE unchanged from prior.    -Continuing to hold low-dose GDMT at this time given lower blood pressures.  Blood pressure is now improved and this morning. Can continue to hold in setting of orthostasis and risk of falls  -Can resume 10mg Torsemide daily for maintenance diuretic.  - Continue to obtain standing daily weights if possible.  Patient notes that his dry weight seems to be around 165 pounds.  - Please ambulate patient as much as possible safely as staying in bed will further exacerbate his weakness and hypotension.  - Do not suspect that pleural effusions are completely explained by pts current volume status.    2. CAD s/p CABG  Patient with history of  CAD status post CABG in 1996.  Maintained on Plavix.  He is on statin as an outpatient and can continue while inpatient    3. Fall  It does not appear the patient's cardiac history has played a significant role in this patient's fall as he relays no prodromal symptoms and states that his legs just gave out.  In a 89-year-old gentleman our concern would be some level of orthostasis with his blood pressure running low as well as with difficulties in managing his fluid status.  Suspect that there is orthostasis and weakness involved.    4. Atrial Fibrillation  History of chronic atrial fibrillation maintained on beta-blocker.  Beta-blocker currently on hold in the setting of hypotension with rates still controlled from 70s to 90s.  Patient not on anticoagulation at this time given GI bleeding.  We will continue to follow-up with regards to appropriateness of Watchman procedure as an outpatient. This conversation has already taken place as an outpatient.        Eric Gilbert, DO

## 2023-08-09 NOTE — PROGRESS NOTES
Internal Medicine  Daily Progress Note       SUBJECTIVE   This is a 89 y.o. year-old male admitted on 8/3/2023 with Acute on chronic systolic (congestive) heart failure (CMS/HCC) [I50.23].    Interval History: NAEON. Patient slept comfortably. Denies any dizziness when standing but reports pain in right hip while standing. CT from yesterday revealed nondisplaced fractures of the right inferior pubic ramus and right acetabulum with extension of the fracture plane .      OBJECTIVE      Vital signs in last 24 hours:  Temp:  [36.4 °C (97.5 °F)-36.9 °C (98.4 °F)] 36.4 °C (97.5 °F)  Heart Rate:  [75-94] 81  Resp:  [17] 17  BP: (104-120)/(58-70) 111/61  Temp (24hrs), Av.7 °C (98 °F), Min:36.4 °C (97.5 °F), Max:36.9 °C (98.4 °F)    Intake/Output     Intake Evening 23 1500 - 239 Night 23 2300 - 23 0659 Day 23 07 - 23 1459 Output Evening 23 1500 - 23 Night 23 - 23 0659 Day 23 - 23 1459    P.O. -- -- -- Urine 175 200 --    I.V. -- -- -- Stool -- 150 --    IV Piggyback -- -- --                    Total -- -- -- Total 175 350 --   Last 3 shifts --  Intake: 0       Output: 525       Net: -525         PHYSICAL EXAMINATION      Physical Exam  Constitutional:       Appearance: He is normal weight.   Cardiovascular:      Rate and Rhythm: Normal rate and regular rhythm.      Pulses: Normal pulses.      Heart sounds: Normal heart sounds.   Pulmonary:      Breath sounds: Examination of the left-lower field reveals decreased breath sounds. Decreased breath sounds present.      Comments: Improved after Thoracentesis  Abdominal:      General: Abdomen is flat.   Skin:     General: Skin is warm and dry.      Capillary Refill: Capillary refill takes less than 2 seconds.   Neurological:      Mental Status: He is alert and oriented to person, place, and time. Mental status is at baseline.   Psychiatric:         Mood and Affect: Mood normal.          Behavior: Behavior normal.         Thought Content: Thought content normal.         Judgment: Judgment normal.        LINES, CATHETERS, DRAINS, AIRWAYS, AND WOUNDS   Lines, Drains, Airways, Wounds:  Peripheral IV (Adult) 08/03/23 Anterior;Left Forearm (Active)   Number of days: 6       Colostomy LLQ (Active)   Number of days: 6       External Urinary Catheter (Active)   Number of days: 6       Comments:      LABS / IMAGING / TELE      Labs    Results from last 7 days   Lab Units 08/09/23  0512 08/08/23  0543 08/07/23  0602   MAGNESIUM mg/dL 1.8 1.9 1.8     Results from last 7 days   Lab Units 08/09/23  0512 08/08/23  0543 08/07/23  0602   SODIUM mEQ/L 137 136 135*   POTASSIUM mEQ/L 4.0 4.3 3.9   CHLORIDE mEQ/L 105 103 103   CO2 mEQ/L 24 25 22   BUN mg/dL 38* 40* 41*   CREATININE mg/dL 1.2 1.4* 1.3   GLUCOSE mg/dL 109* 118* 136*   CALCIUM mg/dL 8.0* 8.2* 8.2*       Results from last 7 days   Lab Units 08/09/23  0512 08/08/23  0543 08/07/23  0602   WBC K/uL 5.78 5.65 5.50   HEMOGLOBIN g/dL 10.2* 9.8* 10.4*   HEMATOCRIT % 31.0* 30.4* 30.6*   PLATELETS K/uL 121* 120* 112*         Imaging personally reviewed(does not include unread studies):  CT HIP RIGHT WITHOUT IV CONTRAST    Result Date: 8/8/2023  IMPRESSION: Nondisplaced fractures of the right inferior pubic ramus and right acetabulum with extension of the fracture plane superiorly to involve the right iliac bone.    X-RAY KNEE RIGHT 1 OR 2 VIEWS    Result Date: 8/8/2023  IMPRESSION: See above.     IR THORACENTESIS    Result Date: 8/7/2023  IMPRESSION: Successful ultrasound-guided left-sided thoracentesis with 1.2 L pleural fluid removed and sent to the lab.  All components of the time-out, debriefing, and handling of the specimen were conducted as per the ASAP Specimen Protocol. I certify that I have personally reviewed this examination and agree with Avril Nava's report. Gigi Berry M.D.    X-RAY CHEST 1 VIEW    Result Date: 8/7/2023  IMPRESSION: No evidence  of pneumothorax.    X-RAY CHEST 1 VIEW    Result Date: 8/4/2023  IMPRESSION: No appreciable pneumothorax seen following left thoracentesis. Residual moderately large left pleural effusion and small right pleural effusion with underlying consolidative opacity.     IR THORACENTESIS    Result Date: 8/4/2023  IMPRESSION: Successful ultrasound-guided left-sided thoracentesis with 60 mL pleural fluid removed and sent to the lab.  All components of the time-out, debriefing, and handling of the specimen were conducted as per the ASAP Specimen Protocol. I certify that I have personally reviewed this examination and agree with Avril Nava's report. Constantino Potts MD    X-RAY HIP WITH OR WITHOUT PELVIS 2-3 VW RIGHT    Result Date: 8/3/2023  IMPRESSION: Osteopenia.  Minimally displaced right inferior pubic ramus fracture.     X-RAY SHOULDER RIGHT 2+ VIEWS    Result Date: 8/3/2023  IMPRESSION: No evidence of a right shoulder or humerus fracture. Possible rotator cuff calcific tendinosis. Marked limited evaluation of the right elbow. No evidence of a displaced elbow fracture. Lateral elbow examination can be considered for improved evaluation. COMMENT: 2 views right shoulder performed. Acromioclavicular and glenohumeral alignment are within normal limits for provided images (noting possible superior elevation of the humeral head related to rotator cuff tendinopathy, though this may be due to projectional artifact). No evidence of an acute fracture soft tissue swelling. Imaged portions of the right lung are clear. 2 views right humerus are performed. Possible rotator cuff calcific tendinosis. No evidence of an acute right humerus fracture. Imaged portions of the right lung are clear. No soft tissue swelling. 2 views of the right elbow performed. No evidence of displaced fracture. Markedly limited examination the absence of lateral projection.    X-RAY HUMERUS RIGHT    Result Date: 8/3/2023  IMPRESSION: No evidence of a right shoulder  or humerus fracture. Possible rotator cuff calcific tendinosis. Marked limited evaluation of the right elbow. No evidence of a displaced elbow fracture. Lateral elbow examination can be considered for improved evaluation. COMMENT: 2 views right shoulder performed. Acromioclavicular and glenohumeral alignment are within normal limits for provided images (noting possible superior elevation of the humeral head related to rotator cuff tendinopathy, though this may be due to projectional artifact). No evidence of an acute fracture soft tissue swelling. Imaged portions of the right lung are clear. 2 views right humerus are performed. Possible rotator cuff calcific tendinosis. No evidence of an acute right humerus fracture. Imaged portions of the right lung are clear. No soft tissue swelling. 2 views of the right elbow performed. No evidence of displaced fracture. Markedly limited examination the absence of lateral projection.    X-RAY ELBOW RIGHT 3+ VIEWS    Result Date: 8/3/2023  IMPRESSION: No evidence of a right shoulder or humerus fracture. Possible rotator cuff calcific tendinosis. Marked limited evaluation of the right elbow. No evidence of a displaced elbow fracture. Lateral elbow examination can be considered for improved evaluation. COMMENT: 2 views right shoulder performed. Acromioclavicular and glenohumeral alignment are within normal limits for provided images (noting possible superior elevation of the humeral head related to rotator cuff tendinopathy, though this may be due to projectional artifact). No evidence of an acute fracture soft tissue swelling. Imaged portions of the right lung are clear. 2 views right humerus are performed. Possible rotator cuff calcific tendinosis. No evidence of an acute right humerus fracture. Imaged portions of the right lung are clear. No soft tissue swelling. 2 views of the right elbow performed. No evidence of displaced fracture. Markedly limited examination the absence of  lateral projection.    CT CHEST WITH IV CONTRAST    Result Date: 8/3/2023  IMPRESSION: 1. Large right and small left pleural effusions with adjacent compressive atelectasis and significant left lower lobe volume loss. 2. Cardiomegaly with IVC distention and contrast reflux in keeping with heart failure. 3. 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 4. Moderate T4 and mild L3 compression fractures, age indeterminate.  Clinically correlate.    CT ABDOMEN PELVIS WITH IV CONTRAST    Result Date: 8/3/2023  IMPRESSION: 1. Large right and small left pleural effusions with adjacent compressive atelectasis and significant left lower lobe volume loss. 2. Cardiomegaly with IVC distention and contrast reflux in keeping with heart failure. 3. 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 4. Moderate T4 and mild L3 compression fractures, age indeterminate.  Clinically correlate.    CT HEAD WITHOUT IV CONTRAST    Result Date: 8/3/2023  IMPRESSION:  No acute intracranial abnormality.  Multiple scalp hematomas.  No evidence for acute fracture or traumatic subluxation the cervical spine.  Large left pleural effusion. COMMENT: A standard noncontrast head CT was performed. Noncontrast CT examination of the cervical spine performed following the standard protocol. Sagittal and coronal reformations rendered from axial source images. Images reviewed in bone and soft tissue windows. CT DOSE:  One or more dose reduction techniques (e.g. automated exposure control, adjustment of the mA and/or kV according to patient size, use of iterative reconstruction technique) utilized for this examination. Comparison:  No prior studies are available for comparison. Findings:  Sulci, ventricles and basal cisterns are prominent within normal limits for patient's age.  Mild periventricular and subcortical white matter hypoattenuation, nonspecific, likely sequela microangiopathic disease.  No acute  hemorrhage, acute territorial infarct, or mass effect is seen.  There is no extra-axial fluid collection.  The visualized paranasal sinuses are clear. Mastoid air cells are clear.  Moderate-sized left frontal scalp hematoma.  Small right frontal scalp hematoma. Cervicothoracic alignment: Anatomic. Prevertebral soft tissues: Normal in thickness. Vertebral bodies: Normal in height.  No acute fractures.  Fusion of the posterior elements of C4 on C5. Intervertebral discs: Disc osteophyte complex throughout the cervical spine. Cervical and upper thoracic spinal canal: Patent. Axial images: Skull base: Normal. C1-2: Normal. C2-3: Facet arthrosis, right uncovertebral hypertrophic changes, contributing to mild right foraminal narrowing. C3-4: Disc osteophyte complex with severe right greater than left uncovertebral hypertrophic changes, severe left facet hypertrophy, contributing to moderate bilateral foraminal narrowing. C4-5: Severe left and moderate right facet hypertrophy, contributing to moderate to moderate severe left foraminal narrowing. C5-6: Small disc osteophyte complex with facet arthrosis and left uncovertebral hypertrophic changes, contributing to mild left foraminal narrowing. C6-7: Mild posterior osseous spurring without consequence. C7-T1: Right facet arthrosis without consequence. Other: Large left pleural effusion.  Right apical pleural-parenchymal scarring.     CT CERVICAL SPINE WITHOUT IV CONTRAST    Result Date: 8/3/2023  IMPRESSION:  No acute intracranial abnormality.  Multiple scalp hematomas.  No evidence for acute fracture or traumatic subluxation the cervical spine.  Large left pleural effusion. COMMENT: A standard noncontrast head CT was performed. Noncontrast CT examination of the cervical spine performed following the standard protocol. Sagittal and coronal reformations rendered from axial source images. Images reviewed in bone and soft tissue windows. CT DOSE:  One or more dose reduction  techniques (e.g. automated exposure control, adjustment of the mA and/or kV according to patient size, use of iterative reconstruction technique) utilized for this examination. Comparison:  No prior studies are available for comparison. Findings:  Sulci, ventricles and basal cisterns are prominent within normal limits for patient's age.  Mild periventricular and subcortical white matter hypoattenuation, nonspecific, likely sequela microangiopathic disease.  No acute hemorrhage, acute territorial infarct, or mass effect is seen.  There is no extra-axial fluid collection.  The visualized paranasal sinuses are clear. Mastoid air cells are clear.  Moderate-sized left frontal scalp hematoma.  Small right frontal scalp hematoma. Cervicothoracic alignment: Anatomic. Prevertebral soft tissues: Normal in thickness. Vertebral bodies: Normal in height.  No acute fractures.  Fusion of the posterior elements of C4 on C5. Intervertebral discs: Disc osteophyte complex throughout the cervical spine. Cervical and upper thoracic spinal canal: Patent. Axial images: Skull base: Normal. C1-2: Normal. C2-3: Facet arthrosis, right uncovertebral hypertrophic changes, contributing to mild right foraminal narrowing. C3-4: Disc osteophyte complex with severe right greater than left uncovertebral hypertrophic changes, severe left facet hypertrophy, contributing to moderate bilateral foraminal narrowing. C4-5: Severe left and moderate right facet hypertrophy, contributing to moderate to moderate severe left foraminal narrowing. C5-6: Small disc osteophyte complex with facet arthrosis and left uncovertebral hypertrophic changes, contributing to mild left foraminal narrowing. C6-7: Mild posterior osseous spurring without consequence. C7-T1: Right facet arthrosis without consequence. Other: Large left pleural effusion.  Right apical pleural-parenchymal scarring.       ECG/Telemetry  I have independently reviewed the telemetry. No events for the  last 24 hours.    ASSESSMENT AND PLAN      * Pleural effusion on left  Assessment & Plan  Large left and small right pleural effusion on CT chest  Suspect etiology most likely acute CHF due to right-sided heart failure however asymmetric nature raises concern for other etiology (ie, malignancy, paramedic)      - F/U with cytology for results  - breath sounds on left have improved.   - Fluid protein- 4.7 and Serum protein 6.9. Ratio= .68 . - Exudative effusion          Respiratory failure with hypoxia (CMS/HCC)  Assessment & Plan  Hypoxia to 89% on arrival which improved on 2 L NC  In the setting of CT chest revealing large left and small right pleural effusions  No wheezing on exam, has had a productive cough likely cardiac in nature due to volume overload    - Plan as per acute CHF problem and pleural effusion problem  - Wean O2 as able to maintain O2 sat >92% (O2 sat is currently at 95 on 2L NC)    Fall  Assessment & Plan  Fall after standing from bed, likely mechanical in the setting of generalized weakness and hypoxia versus a component of orthostatic hypotension  No associated loss of consciousness or seizure activity  CT head on admission without acute intracranial abnormality    Plan:   - Ortho consult- WABT on Right  Hip. 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 Jeffrey at [facility]  Home regimen: Metoprolol 25 mg daily, Entresto, Toremide 40 mg every other day  Dry weight 166 (reported). Weight at 162  Physical exam shows crackles, , CT chest shows large left and small right pleural effusions    CHF exacerbation likely in the s/o increased fluid intake vs dietary  indiscretion vs component of progression of known RV dysfunction due to pulmonary HTN; otherwise low suspicion for ACS as trop plateau'd and ECG without acute ischemic change        Plan  - Start oral 10mg torsemide , re-dose based on Cr response  - Hold GDMT given soft BP and decompensated CHF, resume as able  - Cards c/s, preferably CHF/pHTN team  - CHF orderset: strict I&Os, daily standing wt, replete lytes for goal Mg>2 and K>4, cardiac diet with fluid restriction    Orthostatic hypotension  Assessment & Plan  - Noted to be Orthostatic since admission  - Intravascularly depleted.  - Weakness      Plan  - Not currently orthostatic   - Give b/l compression stockings  - Encourage P.O. intake     UTI (urinary tract infection)  Assessment & Plan  Patient with recent UTI being treated with 7-day course of ciprofloxacin as an outpatient (initiated 7/29/2023)  Denies urinary complaints    - Complete Ceftriaxone course  - Switched from Cipro d/t prolonged QTc    Elevated troponin  Assessment & Plan  Minimally elevated on admission, likely demand in the setting of acute CHF  No chest pain and ECG without acute ST-T wave abnormalities    - Repeat ECG and troponin PRN for chest pain    Atrial fibrillation (CMS/HCC)  Assessment & Plan  History of chronic atrial fibrillation on metoprolol succinate 25 mg daily  Previously on Eliquis however does not appear to have tolerated due to diverticular bleeding; also may not be a good candidate due to falls  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    - Resume metoprolol as BP allows pending improvement in decompensated CHF  - Monitor on telemetry, repeat ECG as needed    Hypertension  Assessment & Plan  Home regimen: Metoprolol 25 mg daily    - Hold BP meds given soft Bps, resume as able    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)  Assessment & Plan  CT abdomen/pelvis noted an incidental 3.5 x 3.5 cm internal iliac artery aneurysm that is partially  thrombosed  Stable in size from prior CT abdomen/pelvis in 10/2022    - Vascular surgery consulted-no acute surgical intervention, will follow-up as an outpatient  - Continue supportive management with BP control    CALI (acute kidney injury) (CMS/HCC)  Assessment & Plan  Cr 1.8 on presentation, last 1.6 in 4/2023 with prior baseline ~1.0 from 11/2022  Likely 2/2 cardiorenal syndrome in the setting of acute CHF  S/p 500 cc NSS in ED due to soft BP    - BMP daily   - Encourage P.O. intake  - Avoid nephrotoxins, dose meds renally  - Heparin for DVT prophylaxis    Coronary artery disease  Assessment & Plan  Hx CABG in 1996, subsequent adenosine MPI negative for ischemia in 6/13  On Plavix  No chest pain, ECG without acute ischemic changes, trop plateau'd    - Continue Plavix       VTE Assessment: Padua    VTE Prophylaxis: Current anticoagulants:    •None      Code Status: Full Code  Estimated discharge date: 8/9/2023     ATTENDING DOCUMENTATION  ALSO SEE ATTENDING ATTESTATION SECTION OF NOTE

## 2023-08-09 NOTE — PLAN OF CARE
Care Coordination Discharge Plan Note     Care Coordination Discharge Plan Summary    Admission Assessment Summary    General Information  Readmission Within the last 30 days: no previous admission in last 30 days  Does patient have a :    Patient-Specific Goals (include timeframe): to be less SOB    Living Arrangements  Arrived From: home  Current Living Arrangements: home  People in Home: other (see comments)  Home Accessibility: ramp to enter home  Living Arrangement Comments:      Social Determinants of Health - Screenings  Housing Stability  In the last 12 months, was there a time when you were not able to pay the mortgage or rent on time?: No  In the last 12 months, how many places have you lived?: 1  In the last 12 months, was there a time when you did not have a steady place to sleep or slept in a shelter (including now)?: No  Financial Resource Strain  How hard is it for you to pay for the very basics like food, housing, medical care, and heating?: Not hard at all  Transportation Needs  In the past 12 months, has lack of transportation kept you from medical appointments or from getting medications?: No  In the past 12 months, has lack of transportation kept you from meetings, work, or from getting things needed for daily living?: No    Functional Status Prior to Admission  Assistive Device/Animal Currently Used at Home: walker, front-wheeled, wheelchair, hospital bed, grab bar  Functional Status Comments: needs assistance x1  IADL Comments: needs help with bathing, toileting, and cooking    Discharge Needs Assessment    Concerns to be Addressed: care coordination/care conferences, discharge planning, adjustment to diagnosis/illness  Current Discharge Risk: chronically ill  Anticipated Changes Related to Illness: inability to care for self    Discharge Plan Summary        Discharge Transportation:  Is Out of Hospital DNR needed at Discharge: no  Does patient need discharge transport?  Yes  Discharge Transportation Vendor: [facility] ([phone], option 5)  Type of Transportation: basic life support  What day is the transport expected?: 08/09/23  What time is the transport expected?: 1530         ---------------------------------------------------------------------------------------------------------------------    Interdisciplinary Discharge Plan Review:  Participants:     Concerns Comments: Per DPR, [facility] today pending PT/OT. Patient's son and caregiver coming in work with PT/OT, awaiting recs. [facility] services following.Family is agreeable to the discharge home with [facility]. [facility] rescheduled for 1530.    Discharge Plan:   Disposition/Destination: Home Health Care - [facility] / Home  Discharge Facility:    Community Resources:      Discharge Transportation:  Is Out of Hospital DNR needed at Discharge: no  Does patient need discharge transport? Yes  Discharge Transportation Vendor: [facility] ([phone], option 5)  Type of Transportation: basic life support  What day is the transport expected?: 08/09/23  What time is the transport expected?: 1530

## 2023-08-09 NOTE — PROGRESS NOTES
Provided medical update for patient's son at bedside. All questions answered.    Tania Browning MD  Internal Medicine, PGY-2  [facility]  Pager #[phone]

## 2023-08-09 NOTE — NURSING NOTE
Discharge instructions reviewed with patient and patients son, pt verbalized understanding. IV removed, monitor removed. Pt discharged to home with all belongings via [facility] ambulance transport.

## 2023-08-09 NOTE — PROGRESS NOTES
Physical Therapy -  Daily Treatment/Progress Note     Patient: Samy Glass  Location: [facility] [address] [address]  MRN: [id number]  Today's date: 8/9/2023    HISTORY OF PRESENT ILLNESS     Samy is a 89 y.o. male admitted on 8/3/2023 with Acute on chronic systolic (congestive) heart failure (CMS/HCC) [I50.23]. Principal problem is Acute on chronic systolic (congestive) heart failure (CMS/HCC).    Past Medical History  Samy has no past medical history on file.    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    PRIOR LEVEL OF FUNCTION AND LIVING ENVIRONMENT     Prior Level of Function    Flowsheet Row Most Recent Value   Ambulation assistive person   Transferring assistive person   Toileting assistive person   Bathing assistive person   Dressing assistive person   Eating independent   IADLs assistive person   Driving/Transportation friends/family   Communication understands/communicates without difficulty   Prior Level of Function Comment MinAx1 at baseline, ambulates with RW household distances. mostly sits in high back chair or w/c   Assistive Device Currently Used at Home walker, front-wheeled, wheelchair, hospital bed, grab bar        Prior Living Environment    Flowsheet Row Most Recent Value    People in Home other (see comments)   Current Living Arrangements home   Home Accessibility ramp to enter home   Living Environment Comment home with ramp entry        VITALS AND PAIN     PT Vitals    Date/Time Pulse HR Source BP MAP BP Location BP Method Pt Position [facility]   08/09/23 1350 -- -- 106/62 -- Left upper arm Automatic Lying OC   08/09/23 1350 78 Monitor -- 73 mmHg -- -- -- CF   08/09/23 1405 -- -- 110/53 -- Left upper arm Automatic Sitting OC   08/09/23 1405 -- -- -- 75 mmHg -- -- -- CF   08/09/23 1430 -- -- 106/57 74 mmHg Left upper arm Automatic Lying CF      PT Pain    Date/Time Pain Type Acceptable Pain Level Side/Orientation Location Rating: Rest Rating: Activity Interventions [facility]   08/09/23 1350 Pain Assessment -- right hip 2 - mild pain 6 - moderate-severe pain position adjusted OC   08/09/23 1350 -- 0 -- -- -- -- -- CF        Objective   OBJECTIVE     Start time:  1344  End time:  1432  Session Length: 48 min  Mode of Treatment: co-treatment, physical therapy (Patient seen for skilled PT/OT overlap to maximize patients ability to participate; for family/caregiver education; and to expedite d/c.)    General Observations  Patient received supine, in bed. He was agreeable to therapy, no issues or concerns identified by nurse prior to session. RN cleared for therapy. Received supine in alarmed bed; +tele; +compression stockings; +lidocane patch on R knee and R hip. Patient's son, Ethan, and aide, Melinda, present for entire session for family training.    Precautions: fall, weight bearing  Extremity Weight-Bearing Status: right lower extremity, right upper extremity  Right UE Weight-Bearing Status: weight-bearing as tolerated (WBAT)  Right LE Weight-Bearing Status: weight-bearing as tolerated (WBAT)    Limitations/Impairments: safety/cognitive, hearing   Services  Do You Speak a Language Other Than English at Home?: no      PT Eval and Treat - 08/09/23 1344        Cognition    Orientation  Status oriented x 3     Affect/Mental Status WFL     Follows Commands follows two-step commands;over 90% accuracy;increased processing time needed;verbal cues/prompting required     Cognitive Function executive function deficit;safety deficit     Executive Function Deficit minimal deficit     Safety Deficit minimal deficit     Comment, Cognition Patient is very pleasant and self motivated to participate in therapy. Benefits from safety cues during transfers. Son and aide extensively educated on safe transfers and bed mobility. Verbalize and demonstrate good understanding. Reiterated that patient requires assist x2 for all OOB transfers.        Motor Skills    Functional Endurance decline from baseline        Bed Mobility    Bed Mobility Activities left;right;rolling;supine to sit;sit to supine     [place] 2 person assist;moderate assist (50-74% patient effort)     Safety/Cues increased time to complete;verbal cues;tactile cues;hand placement     Assistive Device bed rails;draw sheet;head of bed elevated     Comment OOB to L. Patient requires ModAx2 for supine to sit; ModAx1 for sit to supine and to readjust to midline, and MinAx1 for rolling to L and R.        Mobility Belt    Mobility Belt Used for All Out of Bed Activity no     Reason Mobility Belt Not Used other (see comments)     Reason Mobility Belt Not Used benefits from tactile cues        Sit/Stand Transfer    Surface elevated bed;edge of bed;chair with arm rests     [place] minimum assist (75% or more patient effort);moderate assist (50-74% patient effort)     Safety/Cues verbal cues;hand placement;maintaining center of gravity over base of support;preparatory posture;proper use of assistive device;sequencing;technique     Assistive Device walker, front-wheeled;other (see comments)   B HHA    Transfer Comments Patient performed 3 STS transfers from elevated EOB and 1 STS transfer from low w/c. First EOB transfer, utilized RW, patient able to stand  with MinAx1; deferred RW use for remaining transfers. 2nd EOB transfer with B HHA; patient requires MinAx2. 3rd EOB transfer, patient with increased fatigue and requires ModAx2. Also requires ModAx2 for transfer from low w/c.        Surface-to-Surface Transfers    Transfer Location bed to chair;wheelchair     Transfer Technique stand pivot;stand step     [place] moderate assist (50-74% patient effort);2 person assist     Safety/Cues hand placement;maintaining center of gravity over base of support;preparatory posture;proper use of assistive device;sequencing;technique     Assistive Device other (see comments)   B HHA    Transfer Comments Patient requires ModAx2 / B HHA for SPT and stand step transfer from EOB <-> wheelchair.        Balance    Static Sitting Balance WFL;supported;sitting in chair;sitting, edge of bed     Dynamic Sitting Balance mild impairment;supported;sitting, edge of bed     Sit to Stand Dynamic Balance moderate impairment;supported     Static Standing Balance mild impairment;supported     Dynamic Standing Balance moderate impairment;supported     Comment, Balance Patient benefits from assist x2 for OOB STS and SPT.        Lower Extremity (Therapeutic Exercise)    Exercise Position/Type seated;AROM (active range of motion)     General Exercise bilateral;ankle pumps;LAQ (long arc quad)     Reps and Sets 1x10     Comment Provided and educated patient on HEP to maintain ROM.        Impairments/Safety Issues    Impairments Affecting Function balance;endurance/activity tolerance;pain;postural/trunk control;range of motion (ROM);strength                             Patient and family/caregiver educated on safe bed mobility and transfers, and proper body mechanics.      Session Outcome  Patient reclined, in bed at end of session, bed alarm on, all needs met, call light in reach, personal items in reach. Nursing notified about change in vital signs, patient's performance, patient's position, and  patient's response to therapy/activity.    AM-PAC™ - Mobility (Current Function)     Turning form your back to your side while in flat bed without using bedrails 3 - A Little   Moving from lying on your back to sitting on the side of a flat bed without using bedrails 3 - A Little   Moving to and from a bed to a chair 2 - A Lot   Standing up from a chair using your arms 2 - A Lot   To walk in a hospital room 2 - A Lot   Climbing 3-5 steps with a railing 1 - Total   AM-PAC™ Mobility Score 13      ASSESSMENT AND PLAN     Progress Summary  [facility] 13/24: Patient seen for follow up session and family/caregiver education. Requires MinAx1 for rolling toward L and R, ModAx2 for supine -> sit and ModAx1 to return to supine. Requires MinAx2 to rise from elevated surface; ModAx2 to rise from low surface and with increased fatigue; requires ModAx2 for stand pivot and stand step transfers. Son and Aide extensively educated on safety during transfers and safe body mechanics; able to demonstrate compliance. Continue to recommend d/c home with assistance and home health once medically stable to continue to work with family in the home, address deficits, and maximize LOF.    Patient/Family Therapy Goals Statement: to go home and get stronger    PT Plan    Flowsheet Row Most Recent Value   Rehab Potential good, to achieve stated therapy goals at 08/09/2023 1344   Therapy Frequency 5 times/wk at 08/09/2023 1344   Planned Therapy Interventions balance training, bed mobility training, gait training, home exercise program, patient/family education, postural re-education, ROM (range of motion), strengthening, stretching, transfer training at 08/09/2023 1344          PT Discharge Recommendations    Flowsheet Row Most Recent Value   PT Recommended Discharge Disposition home with assistance, home with home health at 08/09/2023 1344   Anticipated Equipment Needs at Discharge (PT) none at 08/09/2023 1344               PT Goals    Flowsheet Row  Most Recent Value   Bed Mobility Goal 1    Activity/Assistive Device bed mobility activities, all at 08/06/2023 1133   [place] minimum assist (75% or more patient effort) at 08/06/2023 1133   Time Frame short-term goal (STG) at 08/06/2023 1133   Progress/Outcome goal ongoing at 08/06/2023 1133   Transfer Goal 1    Activity/Assistive Device all transfers at 08/06/2023 1133   [place] minimum assist (75% or more patient effort) at 08/06/2023 1133   Time Frame short-term goal (STG) at 08/06/2023 1133   Progress/Outcome goal ongoing at 08/06/2023 1133   Gait Training Goal 1    Activity/Assistive Device gait (walking locomotion) at 08/06/2023 1133   [place] minimum assist (75% or more patient effort) at 08/06/2023 1133   Distance 50 at 08/06/2023 1133   Time Frame short-term goal (STG) at 08/06/2023 1133   Progress/Outcome goal ongoing at 08/06/2023 1133

## 2023-08-09 NOTE — PROGRESS NOTES
Orthopedic Progress Note:    Patient seen and examined at bedside per request of primary team. State patient has had pain with ambulation and new CT was obtained. CT shows prior fracture with possible extension superiorly that does not appear to reach the cortex. This may be present on initial CT but new CT is more focused on the hip and thus more clear, providing more detail. Patient states that while in bed he is having no pain, but does have pain when he stands or walks. On exam, patient has full painless passive ROM of the hip. No pain with log roll. Able to maintain SLR. No pain with axial compression of flexed hip and knee. Motor and sensation remain intact throughout lower extremity.    Patient is expected to have pain in the groin region with the fracture seen on imaging. He can remain WBAT to the RLE and continue to work with PT.Continue non-operative treatment at this time.    Please reach out with any further questions or concerns.    Dale Frazier, DO  Orthopedic Surgery, PGY-3  Pager: [phone]        This note was created using voice-to-text dictation software, please excuse any errors in translation

## 2023-08-09 NOTE — PROGRESS NOTES
Occupational Therapy -  Daily Treatment/Progress Note     Patient: Samy Glass  Location: [facility] [address] [address]  MRN: [id number]  Today's date: 8/9/2023    HISTORY OF PRESENT ILLNESS     Samy is a 89 y.o. male admitted on 8/3/2023 with Acute on chronic systolic (congestive) heart failure (CMS/HCC) [I50.23]. Principal problem is Acute on chronic systolic (congestive) heart failure (CMS/HCC).    Past Medical History  Samy has no past medical history on file.    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    PRIOR LEVEL OF FUNCTION AND LIVING ENVIRONMENT     Prior Level of Function    Flowsheet Row Most Recent Value   Ambulation assistive person   Transferring assistive person   Toileting assistive person   Bathing assistive person   Dressing assistive person   Eating independent   IADLs assistive person   Driving/Transportation friends/family   Communication understands/communicates without difficulty   Prior Level of Function Comment MinAx1 at baseline, ambulates with RW household distances. mostly sits in high back chair or w/c   Assistive Device Currently Used at Home walker, front-wheeled, wheelchair, hospital bed, grab bar        Prior Living Environment    Flowsheet Row Most Recent  Value   People in Home other (see comments)   Current Living Arrangements home   Home Accessibility ramp to enter home   Living Environment Comment home with ramp entry        Occupational Profile    Flowsheet Row Most Recent Value   Occupational History/Life Experiences retired        VITALS AND PAIN     OT Vitals    Date/Time Pulse HR Source BP MAP BP Location BP Method Pt Position [facility]   08/09/23 1350 -- -- 106/62 -- Left upper arm Automatic Lying OC   08/09/23 1350 78 Monitor -- 73 mmHg -- -- -- CF   08/09/23 1405 -- -- 110/53 -- Left upper arm Automatic Sitting OC   08/09/23 1405 -- -- -- 75 mmHg -- -- -- CF   08/09/23 1430 -- -- 106/57 74 mmHg Left upper arm Automatic Lying CF      OT Pain    Date/Time Pain Type Acceptable Pain Level Side/Orientation Location Rating: Rest Rating: Activity Interventions [facility]   08/09/23 1350 Pain Assessment -- right hip 2 - mild pain 6 - moderate-severe pain position adjusted    08/09/23 1350 -- 0 -- -- -- -- -- CF        Objective   OBJECTIVE     Start time:  1345  End time:  1433  Session Length: 48 min  Mode of Treatment: co-treatment, occupational therapy (family training)    General Observations  Patient received supine, in bed. He was agreeable to therapy, no issues or concerns identified by nurse prior to session. recd with TEDS on and family present at bedside for family training (gdtr works at [employer] and son Ethan)    Precautions: fall, weight bearing  Extremity Weight-Bearing Status: right lower extremity, right upper extremity  Right UE Weight-Bearing Status: weight-bearing as tolerated (WBAT)  Right LE Weight-Bearing Status: weight-bearing as tolerated (WBAT)    Limitations/Impairments: safety/cognitive, hearing      OT Eval and Treat - 08/09/23 1345        Cognition    Orientation Status oriented x 3     Affect/Mental Status WFL     Follows Commands follows two-step commands;over 90% accuracy;increased processing time needed;verbal cues/prompting  required     Cognitive Function safety deficit;executive function deficit     Executive Function Deficit minimal deficit     Safety Deficit minimal deficit     Comment, Cognition pleasant and motivated, benefits from cues t/o session for safety aspects during mobility.        Bed Mobility    Bed Mobility Activities supine to sit;sit to supine;left;right;rolling;scooting/bridging     [place] moderate assist (50-74% patient effort);2 person assist;other (see comments)     Assistive Device draw sheet;bed rails     Comment rolling L/R with MinAx1 and ModAx2 for supine>sit, ModAx1 for sit>supine. VSS. edu family and aide on safe bed mobility body mechanics        Mobility Belt    Mobility Belt Used for All Out of Bed Activity no     Reason Mobility Belt Not Used other (see comments)     Reason Mobility Belt Not Used deferred, pt benefits from tactile cues        Sit/Stand Transfer    Surface elevated bed;chair with arm rests     [place] moderate assist (50-74% patient effort);2 person assist     Safety/Cues technique;preparatory posture     Assistive Device walker, front-wheeled;none     Transfer Comments from elevated EOB pt demo with RW, without RW and demo with son/aide assist (x3 trials) req MinAx2 progressing to ModAx2. from low surface (WC) req ModAx2 for sit<>stand        Surface-to-Surface Transfers    Transfer Location wheelchair     Transfer Technique stand pivot;stand step     [place] moderate assist (50-74% patient effort);2 person assist     Safety/Cues technique;proper use of assistive device;hand placement     Assistive Device walker, front-wheeled;none     Transfer Comments SPT to WC with RW unable to achieve, then SPT to WC with therapist assist and req ModAx2        Lower Body Dressing    Tasks underwear     [place] dependent (less than 25% patient effort)     Position supine     Comment donned brief supine with aide assist pt able to A with rolling        Toileting    Comment  discussed safety, pt with ostomy + brief use        Balance    Static Sitting Balance WFL;sitting, edge of bed;sitting in chair     Dynamic Sitting Balance mild impairment     Sit to Stand Dynamic Balance moderate impairment;supported     Static Standing Balance supported;mild impairment     Dynamic Standing Balance moderate impairment;supported     Balance Interventions occupation based/functional task;sit to stand     Comment, Balance decreased balance durign weight shift c/f RLE buckle, SPT safest        Impairments/Safety Issues    Impairments Affecting Function balance;endurance/activity tolerance;strength;pain                                  Session Outcome  Patient reclined, in bed at end of session, bed alarm on, all needs met, call light in reach, personal items in reach. Nursing notified about patient's performance, patient's position, and patient's response to therapy/activity.    AM-PAC™ - ADL (Current Function)     Putting on/taking off regular lower body clothing 1 - Total   Bathing 2 - A Lot   Toileting 1 - Total   Putting on/taking off regular upper body clothing 3 - A Little   Help for taking care of personal grooming 3 - A Little   Eating meals 4 - None   AM-PAC™ ADL Score 14      ASSESSMENT AND PLAN     Progress Summary  Pt seen for OT session + family training with son, gdtr and aide at bedside. Pt req ModAx1-2 for bed mobility, edu on safety for home dc. Pt  req MinAx2>ModAx2 for sit<>stand and SPT to WC. Edu family/aide and family/aide demo x1 sit<>stand trial with education. Edu on adapted ADL for safety and caregiver/pt safety. Pt would likely be well served at SNF as he is below baseline functional independence, though SNF does not align with pt/familly goals and pt will have supervision and assist AAT per report. Family and pt receptive to Ax2 for all OOB/tsfr. Pt will need A for all aspects of care, requesting home with A, OT rec HHOT.    Patient/Family Therapy Goal Statement: to go  home    OT Plan    Flowsheet Row Most Recent Value   Rehab Potential fair, will monitor progress closely at 08/08/2023 0851   Therapy Frequency 4 times/wk at 08/08/2023 0851   Planned Therapy Interventions activity tolerance training, patient/caregiver education/training, occupation/activity based interventions, strengthening exercise, transfer/mobility retraining, adaptive equipment training, BADL retraining, functional balance retraining, ROM/therapeutic exercise at 08/08/2023 0851          OT Discharge Recommendations    Flowsheet Row Most Recent Value   OT Recommended Discharge Disposition home with assistance, home with home health, other (see comments)  [declining SNF, family training provided to optimize home dc] at 08/09/2023 1345   Anticipated Equipment Needs At Discharge (OT) shower chair, other (see comments), commode, 3-in-1, wheelchair  [urinal,  owns hosp bed, WC, grab bar] at 08/08/2023 0851               OT Goals    Flowsheet Row Most Recent Value   Bed Mobility Goal 1    Activity/Assistive Device bed mobility activities, all at 08/06/2023 1134   [place] supervision required at 08/06/2023 1134   Time Frame by discharge at 08/06/2023 1134   Transfer Goal 1    Activity/Assistive Device all transfers at 08/06/2023 1134   [place] supervision required at 08/06/2023 1134   Time Frame by discharge at 08/06/2023 1134   Dressing Goal 1    Activity/Adaptive Equipment dressing skills, all at 08/06/2023 1134   [place] supervision required at 08/06/2023 1134   Time Frame by discharge at 08/06/2023 1134   Toileting Goal 1    Activity/Assistive Device toileting skills, all at 08/06/2023 1134   [place] supervision required at 08/06/2023 1134   Time Frame by discharge at 08/06/2023 1134

## 2023-08-09 NOTE — PROGRESS NOTES
Physical Medicine and Rehabilitation Progress Note    Subjective     Samy Glass is a 89 y.o. male who was admitted for Acute on chronic systolic (congestive) heart failure (CMS/[facility]) [I50.23].      Patient had the CT yesterday of the right hip and it showed right inferior pubic ramus and right acetabular fractures into the possibly the iliac bone.  Also x-ray of the knee showed no evidence of fracture.  Patient was seen by orthopedics and cleared for weight-bear as tolerated.    I had a long discussion with the medicine team.  Our goals are going to be bed to chair transfers.  We want to try to get him home safely so we will ask physical and Occupational Therapy to work with the patient's son and aide to make sure they can assist the patient with transfers only.  He can use the wheelchair for mobility and we can have home health therapy work with him on ambulation as he is able.  In the meantime it may be of benefit to make sure the patient has first-floor set up with hospital bed, commode, and home health services so they can evaluate further needs of equipment for safety at home.  Patient is in agreement with this plan.    Past records and images reviewed.    Medical History: No past medical history on file.    Surgical History: No past surgical history on file.    Social History:   Social History   Patient lives home with an aide 24/7.  Ramp to enter the home.  Uses a walker has a hospital bed wheelchair and frequently needs assistance of 1.Patient has been to [facility] skilled rehab in the past    Social History Narrative   • Not on file       Lives with:  Aid  Prior Function Level: Prior Level of Function  Ambulation: assistive person  Transferring: assistive person  Toileting: assistive person  Bathing: assistive person  Dressing: assistive person  Eating: independent  IADLs: assistive person  Driving/Transportation: friends/family  Communication: understands/communicates without difficulty  Prior Level  of Function Comment: MinAx1 at baseline, ambulates with RW household distances. mostly sits in high back chair or w/cRequires assistance for majority of mobility was able to walk with a walker      Family History: No family history on file.    History also provided by: Patient and chart      Allergies: Patient has no known allergies.    • atorvastatin  40 mg oral Daily (6p)   • clopidogreL  75 mg oral Daily   • escitalopram  5 mg oral Daily   • levothyroxine  137 mcg oral Daily (6:30a)   • lidocaine  1 patch Topical Daily   • lidocaine  1 patch Topical Daily   • [Provider Managed Hold] metoprolol succinate XL  25 mg oral Daily   • [Provider Managed Hold] sacubitriL-valsartan  2 tablet oral BID   • thiamine  100 mg oral Daily   • torsemide  10 mg oral Daily       Review of Systems   All 11 systems were reviewed and negative except as noted in the HPI.      Objective   Labs  Reviewed the below labs.  WBC 5.78 and H/H 10.2/31 creatinine 1.2 hemoglobin A1c is 6.4  Lab Results   Component Value Date    WBC 5.78 08/09/2023    HGB 10.2 (L) 08/09/2023    HCT 31.0 (L) 08/09/2023     (L) 08/09/2023    CHOL 105 08/04/2023    TRIG 89 08/04/2023    HDL 27 (L) 08/04/2023    ALT 10 08/04/2023    AST 35 08/04/2023     08/09/2023    K 4.0 08/09/2023     08/09/2023    CREATININE 1.2 08/09/2023    BUN 38 (H) 08/09/2023    CO2 24 08/09/2023    TSH 0.09 (L) 08/03/2023    INR 1.4 08/03/2023    HGBA1C 6.4 (H) 08/03/2023     Imaging  I reviewed the below imaging:    Reviewed the CT of the hip from 8/8/2023:  CT scan shows nondisplaced fracture of the right inferior pubic ramus and right acetabulum with extension of the fracture line superiorly into the iliac bone with a positive effusion    ----------------------------------------------------    X-ray of the right knee:  No evidence of any fracture  ----------------------------------------------------  X-ray of the hip from 8/3/2023:  TECHNIQUE: Three view pelvis and  "right hip     BONES: Osteopenia.  Minimally displaced right inferior pubic ramus fracture.     JOINTS: No dislocation. Osteoarthritic changes of both hips with joint space  narrowing, marginal osteophyte formation and marginal sclerosis.  Lower lumbar  spondylitic changes.     SOFT TISSUES: Excreted contrast material in the urinary bladder.     --  IMPRESSION:  Osteopenia.  Minimally displaced right inferior pubic ramus fracture.    ---------------------------------------  X-ray of the right shoulder and elbow from 8/3/2023:  IMPRESSION:  No evidence of a right shoulder or humerus fracture. Possible rotator cuff  calcific tendinosis.  Marked limited evaluation of the right elbow. No evidence of a displaced elbow  fracture. Lateral elbow examination can be considered for improved evaluation.    --------------------------------------------        Physical Exam  Visit Vitals  /61 (BP Location: Right upper arm, Patient Position: Lying)   Pulse 81   Temp 36.4 °C (97.5 °F) (Oral)   Resp 17   Ht 1.854 m (6' 1\")   Wt 73.5 kg (162 lb)   SpO2 98%   BMI 21.37 kg/m²     Examination  Patient is pleasant awake alert  Ecchymosis on the left forehead and frontal region of scalp and into the left eye orbital  HEENT: No neck pain  Lungs: Clear  Heart: Irregular today  Abdomen: Bowel sounds: Soft nontender ostomy functioning  Extremities:   Right lower extremity:  Patient can actively flex abduct adduct with support around the heel and without significant pain.  With weightbearing in the bed he does okay but when standing up he has difficulty shifting his weight onto the right leg and bearing weight.      Assessment   89 y.o. male being consulted for rehabilitation needs.  Plan of care was discussed with patient and Aid in team     1.  Mobility and self-care deficits: At this point a lot of his limitation is the pain through the right hip.  We have been cleared for weight-bear as tolerated.  We will work with physical and " Occupational Therapy along with the patient's aide and son educating on proper transfer technique.  He can then return home with appropriate equipment including hospital bed, wheelchair walker and commode.  We can start home health services to have him try standing and walking as he is able to tolerate.    2.  Hypotension: Patient still remains orthostatic but not as drastic as when he was admitted.  Adjustments have been made.    3.  Right RTC strain: Not complaining of right shoulder pain.    4.  Right inferior pubic ramus fracture:'s CAT scan is showing that this is extended further into the acetabulum.  Patient remains weight-bear as tolerated        5.  CHF with history of coronary artery disease with CABG, chronic A-fib and hypotension: Patient being seen by cardiology.  He is presently undergoing diuresis for CHF.  Presently diuresis is being held secondary to hypotension.  Continue per their recommendations.  No new Assessment & Plan notes have been filed under this hospital service since the last note was generated.  Service: Physical Medicine and Rehabilitation            Shahnaz Peña MD  8/9/2023  1:46 PM

## 2023-08-09 NOTE — PROGRESS NOTES
08/09/23 1539   Hospital to Home   Patient Enrolled in [facility]? Yes   [facility] Coordinator Comment Spoke w/ pt's son (Marko) to discuss Hospital to Home program. He is agreeable to a phone visit. Telemed appt. with [facility] provider scheduled for Friday, August 11, 2023 at 12:30pm. He declined assistance making other follow-up appt's. Son states patient has working scale at home.

## 2023-08-10 ENCOUNTER — PATIENT OUTREACH (OUTPATIENT)
Dept: CASE MANAGEMENT | Facility: CLINIC | Age: 88
End: 2023-08-10
Payer: MEDICARE

## 2023-08-10 ASSESSMENT — ENCOUNTER SYMPTOMS
NAUSEA: 0
COUGH: 1
VOMITING: 0
ACTIVITY CHANGE: 1
DIARRHEA: 0
DIZZINESS: 0
LIGHT-HEADEDNESS: 0
SHORTNESS OF BREATH: 0
FEVER: 0

## 2023-08-10 NOTE — PROGRESS NOTES
SOCIAL  WORK  NOTE    Situation:    Social Work received referral from Bharati Kirkpatrick, RN [facility]. Pt came home from the hospital and is struggling in the home.    Background/Assessment:    Pt is a 89 year old who lives at home with a 24/7 caregiver. MARY called pts son who said pt was a 2 person assist in the hospital and went home with a caregiver so they dont have the support of another person to support the person at home. Pt is struggling to get up to use the restroom, etc.    Pt was in [facility] from Nov 2022 to May 2023. pts son said pt could go there or another snf to get some rehab, as the pt was in the hospital for over 3 inpatient days.    Intervention/Plan:    MARY called [facility] and left a message to see if they can accommodate pt back. Awaiting a return call from admissions.    SW also checking with locals snfs to see if they have availability in the area to see what options may be available for pt.    Patient has 's contact information for future needs.  Patient is aware that  will continue to follow.    Addendum- 1:44 PM-MARY called pts son, Watson, who called SW. At this time pt has a bed at [facility]. Awaiting for pts son to call SW back. This referral is also being reviewed by another agency to see if there may be another option.    Addendum- 3:17 PM- MARY contacted pt and pts son Marko to discuss progress at this time. [facility] Awaiting final determination from [place] at this time and family feels that may be the best location.    Addendum- 3:48 PM-MARY got a call back from son Watson who said also that [facility] is best. Awaiting determination at this time and family wants a quote for an ambulance ride to the rehab tomorrow.    Addendum- 4:38 PM-MARY spoke with [facility] who said they can accept pt for an 11am  time tomorrow. The [facility]. MARY  still awaiting to hear from Yamileth Goodman at [facility] to finalize the arrangements and plan. pts son Marko aware and agreeable of where things are at with the case.    Addendum- 5:42 PM-pt has a bed tomorrow at [facility]. They are able to accept pt. Family on board. They were asked to bring in colostomy bags. Son said he will see what they have. MARY faxing over dc instructions via [employer].    Addendum- 6:07 PM-MARY faxed dc instruction to [phone].    MESHA Lawrence  [facility]   [phone]

## 2023-08-10 NOTE — PROGRESS NOTES
NAME: Samy Glass    MRN: [id number]    YOB: 1930    Event Review:    Initial TCM Patient Outreach Date: 08/10/23    Assessment completed with: Paid Caregiver  Patient stated reason for hospitalization: Fall at home  Discharge Diagnosis: Pleural effusion on left    Patient readmitted in the last 30 days: No  Discharging Facility: [facility]  Date of Last Admission: 08/03/23  Date of Last Discharge: 08/09/23    Patient's perception of their health status since discharge: Worsening (Spoke with Betsy (paid care giver) and Son Marko--They would like for Samy for go to a rehab rather then be home.)    HPI:Spoke with paid caregiver, Betsy and pt's son, Marko today. Pt presented to [facility] on 8/3/23 after a fall at home.      Pt found to have pleural effusion on left side, where 1.2 L of fluid was drained on 8/7.  Pt also found to have fracture of right hip--no surgical intervention done, pt was seen by ortho and physical medicine and rehab.  Pt was seen by cardiology for heart failure.  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 No surgical intervention was done due to good blood supply to the right foot.  Pt directed to follow up with cardiology or vascular for OP management for this incidental finding. Pt deemed stable and was discharged to home with services on 8/9/23.      Spoke with pt's caregiver Betsy and son Marko today.  Jeffery Zhu stated that it is okay to speak with Betsy, Betsy is not listed in emergency contacts.  Pt lives in 2 story home with Betsy who is his 24 hour caregiver.  All care for Marko is on FF. Pt is mainly bed or chair bound.  Pt is complete care since discharge from hospital.  When speaking with son Marko today, Marko stated that his dad is too much for the caregiver Betsy at this time with his right hip fracture.  Marko is requesting his dad go to a rehab post discharge from hospital.  ACEDITA reached out to MARY Gregorio  EUNICE for support. Samy did have prior stay at SNF in past. Darline DAWSON was going to reach out to Marko to discuss further.      Pt with no complaints of chest pain, shortness of breath or dizziness per Betsy on call.  Betsy states Samy does have a cough that is productive but denies Samy has fever/chills.  Samy does have pain from right hip.  Samy rates pain a 8-9/10 on pain scale.  Due to Samy being bed/chair bound no daily weight obtained today.     [facility] will provide nursing services to patient--Unsure if Marko has been contacted yet. Per chart 8/7/23 @ 3153 referral was completed.     Discharge instructions reviewed with Marko, Marko verbalizes understanding. Reviewed general recommendations regarding fall safety. No follow up appointments were discussed due to the plan for Samy possibly changing.  ACM will keep in touch with MARY and keep Marko updated.      ACM explained hospital to home program and reminded patient of the upcoming   appointment with CRNP--ACM will inform CRNP of the possible change in plan of care for Samy.       ACM to follow-up in 1 week on/around 8/17/23 or as needed to update kaykay Zhu.       Review of Systems   Constitutional: Positive for activity change (Pt mostly bed or chair bound due to right hip pain. ). Negative for fever.   Respiratory: Positive for cough (Per Betsy productive at times. ). Negative for shortness of breath.    Cardiovascular: Negative for chest pain and leg swelling.   Gastrointestinal: Negative for diarrhea, nausea and vomiting.   Genitourinary:        Per Betsy, pt wearing a diaper.  Betsy will attempt to catch urine in urinal from pt.    Musculoskeletal:        Chair/bed bound per Betsy   Neurological: Negative for dizziness and light-headedness.       Medication Review:  Medication Review: Yes (Spoke with Betsy. ACM confirmed with son Marko that it was okay for me to speak with Betsy.)     Reported by: Paid Caregiver  Any new medications  prescribed at discharge?: Yes  Is the patient having any side effects they believe may be caused by any medication additions or changes?: No  New prescriptions filled?: Yes     Do you have enough of your regularly prescribed medications?: Yes     Medication adherence problem?: No  Was a medication discrepancy indentified?: No     Nursing Interventions: Nurse provided patient education  Reconciled the current and discharge medications: Yes  Reviewed AVS (Discharge Instructions)?: Yes         Acute Pain:  Acute pain: Yes  Limitation of routine activities due to acute pain?: yes  Acute pain severity: 9  Acute pain timing: constant  Location of acute pain: Right hip    Chronic Pain:  Chronic pain: No     Diet/Nutrition:  Type of Diet: Regular, Fluid Restricted (Per Betsy fluid restriction is 66 oz.)  Diet Adherence: Adherent with diet     Home Care Services:  Home Care Agency: [facility]  Type of Home Care Services: Home Nursing, Home PT, Home OT  Home Care Interventions: Accepted post-discharge     Post-Discharge Durable Medical Equipment::  Durable Medical Equipment: Hospital bed, Manual wheelchair, Front wheeled walker, Grab bars  Oxygen Use: No   DME Interventions: No intervention required    Home Management:  Living Arrangement: Paid Caregiver (Betsy stays with pt 24 hours a day)  Support System:: Child/Children  Type of Residence: 2 [address] house  Home Monitoring: Weight (No weight as pt is bed bound due to hip discomfort.)  Any patient reported falls in the last 3 months?: Yes  Gnosticist or spiritual beliefs that impact treatment?: No    Appointment Scheduling:  PCP appointment scheduled: No   Patient Scheduling Dispositions: Pt will call office to schedule     Follow-Ups:   Relevant Labs & Tests: BMP and Magnesium--1 week; Thyroid function 4-6 weeks  Relevant Specialist Follow-ups: Dr. Gaines cardivascualr disease 1-2 weeks; Orthopedic surgery, 4-6 weeks.    Interventions/ Care Coordination:  Interventions/ Care  Coordination: Encouraged patient to call PCP/Specialist  General Education: Falls/Home safety  Referrals: Referral Social Work    Reviewed signs/symptoms of worsening condition or complication that necessitate a call to the Physician's office.  Educated patient on access to care.  RN phone number given for future care management.    Pilar Lockhart RN

## 2023-08-11 ENCOUNTER — DOCUMENTATION (OUTPATIENT)
Dept: CASE MANAGEMENT | Facility: CLINIC | Age: 88
End: 2023-08-11
Payer: MEDICARE

## 2023-08-11 ENCOUNTER — TELEMEDICINE (OUTPATIENT)
Dept: HOSPITALIST | Facility: CLINIC | Age: 88
End: 2023-08-11
Payer: MEDICARE

## 2023-08-11 ENCOUNTER — PATIENT OUTREACH (OUTPATIENT)
Dept: CASE MANAGEMENT | Facility: CLINIC | Age: 88
End: 2023-08-11
Payer: MEDICARE

## 2023-08-11 DIAGNOSIS — N17.9 AKI (ACUTE KIDNEY INJURY) (CMS/HCC): Primary | ICD-10-CM

## 2023-08-11 DIAGNOSIS — J90 PLEURAL EFFUSION: ICD-10-CM

## 2023-08-11 DIAGNOSIS — I50.22 SYSTOLIC CHF, CHRONIC (CMS/HCC): Primary | ICD-10-CM

## 2023-08-11 DIAGNOSIS — E83.42 HYPOMAGNESEMIA: ICD-10-CM

## 2023-08-11 LAB
GRAM STN SPEC: NORMAL
GRAM STN SPEC: NORMAL
MICROORGANISM SPEC CULT: NORMAL

## 2023-08-11 NOTE — PROGRESS NOTES
Patient was initially DC from [facility] on 8/9 after fall, found to have pleural effusion and R iliac aneurysm. Family communicated to SW and ACM that due to patients escalating care needs, they were unable to care for patient at home. SW did find a facility to care for him and he plans to be admitted today.

## 2023-08-11 NOTE — PATIENT INSTRUCTIONS
Patient was initially DC from [facility] on 8/9 after fall, found to have pleural effusion and R iliac aneurysm. Family communicated to SW and ACM that due to patients escalating care needs, they were unable to care for patient at home. SW did find a facility to care for him and he plans to be admitted today.

## 2023-08-11 NOTE — Clinical Note
Patient was initially DC from [facility] on 8/9 after fall, found to have pleural effusion and R iliac aneurysm. Family communicated to SW and ACM that due to patients escalating care needs, they were unable to care for patient at home. SW did find a facility to care for him and he plans to be admitted today.

## 2023-08-11 NOTE — PROGRESS NOTES
Social Work Note     reached out to [facility] to be sure they have all they need for pt. They are prepared for admission today. SW following to assist further, as needed.     MARY contacted pt at his home to be sure they were ready for . MARY called the pts home. The family is getting pt ready for dc and have colostomy bags they are bringing with them for his care needs.    They said there are no additional needs at this time and are pleased that he is going to rehab.    SW to follow up.    Addendum- 11:44 AM-MARY got a call from pts son, Watson, who said that the ambulance hadnt arrived yet to pick pt up. MARY called [facility] who said they didnt finalize payment yesterday for the  today. MARY called Watson who said he will call Marko to help finalize payment with ambulance company for .    MESHA Lawrence  [facility]   [phone]

## 2023-08-16 ENCOUNTER — PATIENT OUTREACH (OUTPATIENT)
Dept: CASE MANAGEMENT | Facility: CLINIC | Age: 88
End: 2023-08-16
Payer: MEDICARE

## 2023-08-16 NOTE — PROGRESS NOTES
Social Work Note     called pts son Marko to follow up about pts transfer to [facility]. SW only able to reach [facility]. Message left with SW contact information for son to call back at his convenience.    SW to follow up.    Addendum- 11:38 AM- SW got a call back from pts son. He said all is going well with  [facility]. He said his father is in pain but the facility is nice and he is managing as best as he can.    SW encouraged son to call if SW can assist with any needs.    MSEHA Lawrence  [facility]   [phone]

## 2023-08-17 NOTE — PROGRESS NOTES
Nursing Late Entry:   Hospital to Home   Closed to care management. Pt sent to [facility] on 8/10/23.     Pilar Kirkpatrick RN BSN   Ambulatory Care Management   [phone]

## 2023-08-26 NOTE — PROGRESS NOTES
In response to CDI query: Respiratory failure with hypoxia was ruled out. Hypoxia was present.     Ludwig Montenegro MD

## 2023-09-04 LAB — FUNGUS SPEC CULT: NORMAL

## 2023-09-05 LAB — FUNGUS SPEC CULT: NORMAL

## 2023-09-19 LAB — MYCOBACTERIUM SPEC CULT: NORMAL

## 2023-09-20 LAB — MYCOBACTERIUM SPEC CULT: NORMAL

## 2023-11-01 ENCOUNTER — APPOINTMENT (OUTPATIENT)
Dept: PREADMISSION TESTING | Facility: HOSPITAL | Age: 88
End: 2023-11-01
Payer: MEDICARE

## 2023-11-01 VITALS — HEIGHT: 73 IN | BODY MASS INDEX: 22.26 KG/M2 | WEIGHT: 168 LBS

## 2023-11-02 ENCOUNTER — APPOINTMENT (OUTPATIENT)
Dept: PREADMISSION TESTING | Facility: HOSPITAL | Age: 88
End: 2023-11-02
Attending: OPHTHALMOLOGY
Payer: MEDICARE

## 2023-11-02 ENCOUNTER — HOSPITAL ENCOUNTER (OUTPATIENT)
Dept: CARDIOLOGY | Facility: HOSPITAL | Age: 88
Discharge: HOME | End: 2023-11-02
Attending: OPHTHALMOLOGY
Payer: MEDICARE

## 2023-11-02 ENCOUNTER — TRANSCRIBE ORDERS (OUTPATIENT)
Dept: REGISTRATION | Facility: HOSPITAL | Age: 88
End: 2023-11-02

## 2023-11-02 ENCOUNTER — APPOINTMENT (OUTPATIENT)
Dept: LAB | Facility: HOSPITAL | Age: 88
End: 2023-11-02
Attending: OPHTHALMOLOGY
Payer: MEDICARE

## 2023-11-02 VITALS
DIASTOLIC BLOOD PRESSURE: 66 MMHG | OXYGEN SATURATION: 97 % | RESPIRATION RATE: 18 BRPM | TEMPERATURE: 97.3 F | HEIGHT: 73 IN | WEIGHT: 168 LBS | SYSTOLIC BLOOD PRESSURE: 129 MMHG | BODY MASS INDEX: 22.26 KG/M2 | HEART RATE: 69 BPM

## 2023-11-02 DIAGNOSIS — H25.12 AGE-RELATED NUCLEAR CATARACT, LEFT EYE: ICD-10-CM

## 2023-11-02 DIAGNOSIS — H25.12 AGE-RELATED NUCLEAR CATARACT, LEFT EYE: Primary | ICD-10-CM

## 2023-11-02 LAB
ANION GAP SERPL CALC-SCNC: 9 MEQ/L (ref 3–15)
BUN SERPL-MCNC: 25 MG/DL (ref 7–25)
CALCIUM SERPL-MCNC: 8.7 MG/DL (ref 8.6–10.3)
CHLORIDE SERPL-SCNC: 108 MEQ/L (ref 98–107)
CO2 SERPL-SCNC: 24 MEQ/L (ref 21–31)
CREAT SERPL-MCNC: 1.4 MG/DL (ref 0.7–1.3)
ERYTHROCYTE [DISTWIDTH] IN BLOOD BY AUTOMATED COUNT: 17.5 % (ref 11.6–14.4)
GFR SERPL CREATININE-BSD FRML MDRD: 47.4 ML/MIN/1.73M*2
GLUCOSE SERPL-MCNC: 139 MG/DL (ref 70–99)
HCT VFR BLDCO AUTO: 33.7 % (ref 40.1–51)
HGB BLD-MCNC: 10.2 G/DL (ref 13.7–17.5)
MCH RBC QN AUTO: 31.7 PG (ref 28–33.2)
MCHC RBC AUTO-ENTMCNC: 30.3 G/DL (ref 32.2–36.5)
MCV RBC AUTO: 104.7 FL (ref 83–98)
PDW BLD AUTO: 10.6 FL (ref 9.4–12.4)
PLATELET # BLD AUTO: 123 K/UL (ref 150–350)
POTASSIUM SERPL-SCNC: 4.2 MEQ/L (ref 3.5–5.1)
RBC # BLD AUTO: 3.22 M/UL (ref 4.5–5.8)
SODIUM SERPL-SCNC: 141 MEQ/L (ref 136–145)
WBC # BLD AUTO: 4.48 K/UL (ref 3.8–10.5)

## 2023-11-02 PROCEDURE — 80048 BASIC METABOLIC PNL TOTAL CA: CPT

## 2023-11-02 PROCEDURE — 36415 COLL VENOUS BLD VENIPUNCTURE: CPT

## 2023-11-02 PROCEDURE — 85027 COMPLETE CBC AUTOMATED: CPT | Mod: GA

## 2023-11-02 ASSESSMENT — PAIN SCALES - GENERAL: PAINLEVEL: 0-NO PAIN

## 2023-11-02 NOTE — H&P
Preadmission Testing-  Pre-Operative H&P       Patient Name: Samy Elena  Referring Surgeon: Hayder Moses    Reason for Referral: Pre-Operative Evaluation  Surgical Procedure: Cataract extraction with LRI left eye  Operative Date: 11/16/23  Other Providers:      PCP: Zachery Hernandez MD        HISTORY OF PRESENT ILLNESS      Samy Elena is a 89 y.o. male presenting today to the [facility] at [facility] for pre-operative evaluation prior to planned surgery.    Visual disturbance due to cataract.  Denies eye pain, drainage, redness.     The patient denies any current or recent chest pain or pressure, dyspnea, cough, sputum, fevers, chills, abdominal pain, nausea, vomiting, diarrhea or other symptoms.     Functionally, he uses a walker or wheelchair.  No anginal symptoms with activity. Functional capacity < 4 METS.    The patient denies, on specific questioning, the following:  No history of RONNIE.  No history of DVT/PE.  No history of COPD.  No history of CVA.  No history of DM.   No history of CKD.   No history of liver disease or cirrhosis.  No history of bleeding disorder.  No history of difficult airway/difficult intubation.  No history of Malignant hyperthermia.  No history of adverse reaction to anesthesia in the past.    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)     Coronary artery disease     Disease of thyroid gland     Will catheter in place     GI (gastrointestinal bleed)     Hypertension     Myocardial infarction (CMS/HCC)     Orthostatic hypotension     TIA (transient ischemic attack)        Past Surgical History:   Procedure Laterality Date    CHOLECYSTECTOMY      CORONARY ARTERY BYPASS GRAFT      JOINT  REPLACEMENT Left     Knee    THYROIDECTOMY         MEDICATIONS        Current Outpatient Medications:     ascorbic acid (VITAMIN C) 500 mg tablet, Take 500 mg by mouth every morning., Disp: , Rfl:     calcium carbonate (TUMS) 200 mg calcium (500 mg) chewable tablet, Take 1 tablet by mouth 2 (two) times a day., Disp: , Rfl:     clopidogreL (PLAVIX) 75 mg tablet, Take 75 mg by mouth every morning., Disp: , Rfl:     cyanocobalamin (VITAMIN B12) 500 mcg tablet, Take 500 mcg by mouth every morning., Disp: , Rfl:     escitalopram (LEXAPRO) 5 mg tablet, Take 5 mg by mouth every morning., Disp: , Rfl:     levothyroxine (SYNTHROID) 137 mcg tablet, Take 137 mcg by mouth every morning., Disp: , Rfl:     magnesium oxide (MAG-OX) 400 mg (241.3 mg magnesium) tablet, Take 400 mg by mouth 2 (two) times a day., Disp: , Rfl:     melatonin 3 mg tablet, Take 3 mg by mouth nightly., Disp: , Rfl:     multivitamin tablet, Take 1 tablet by mouth every morning., Disp: , Rfl:     omeprazole OTC (PriLOSEC OTC) 20 mg EC tablet, Take 20 mg by mouth daily before breakfast., Disp: , Rfl:     potassium chloride (KLOR-CON) 10 mEq CR tablet, Take 20 mEq by mouth every morning., Disp: , Rfl:     thiamine 100 mg tablet, Take 100 mg by mouth every morning., Disp: , Rfl:     torsemide (DEMADEX) 10 mg tablet, Take 1 tablet (10 mg total) by mouth daily. (Patient taking differently: Take 10 mg by mouth every morning.), Disp: 30 tablet, Rfl: 0    ALLERGIES      Jardiance [empagliflozin]    FAMILY HISTORY      family history is not on file.    Denies any prior known family history of DVTs/PEs/clotting disorder    SOCIAL HISTORY      Social History     Tobacco Use    Smoking status: Never    Smokeless tobacco: Never   Vaping Use    Vaping Use: Never used   Substance Use Topics    Alcohol use: Yes     Comment: social    Drug use: Never       REVIEW OF SYSTEMS      Constitution: Negative for decreased appetite, diaphoresis, fever,  "malaise/fatigue, weight gain and weight loss.   HENT: + for hearing loss wears hearing aids  Eyes: + for visual disturbance.   Cardiovascular: Negative for chest pain, dyspnea on exertion, irregular heartbeat, leg swelling, near-syncope, orthopnea, palpitations, paroxysmal nocturnal dyspnea and syncope.   Respiratory: Negative for cough, hemoptysis, shortness of breath, sleep disturbances due to breathing and snoring.    Hematologic/Lymphatic: Does not bruise/bleed easily.   Skin: Negative for rash.   Musculoskeletal: Negative for arthritis and joint pain. Negative for myalgias.   Gastrointestinal: Negative for heartburn, hematemesis, hematochezia and melena. +colostomy  Genitourinary: Negative for hematuria and nocturia.   Neurological: Negative for dizziness and light-headedness.   Psychiatric/Behavioral: Negative for altered mental status. The patient does not have insomnia.      All other systems reviewed and negative except as noted in HPI    PHYSICAL EXAMINATION      Visit Vitals  /66   Pulse 69   Temp 36.3 °C (97.3 °F) (Oral)   Resp 18   Ht 1.854 m (6' 1\")   Wt 76.2 kg (168 lb)   SpO2 97%   BMI 22.16 kg/m²     Body mass index is 22.16 kg/m².    Physical Exam  Abdominal:      Comments: Colostomy present   Genitourinary:     Comments: Exam deferred      Constitutional: Patient is oriented to person, place, and time. The patient appears well-developed and cooperative. No distress.   HENT:   Head: Normocephalic and atraumatic.   Mouth/Throat: Oropharynx is clear and moist.   Eyes: Pupils are equal, round, and reactive to light. No scleral icterus.   Neck: Normal range of motion. Neck supple.   Cardiovascular: Normal rate and regular rhythm.   No murmur heard.  Pulmonary/Chest: No accessory muscle usage. No tachypnea. No respiratory distress. No decreased breath sounds, wheezes, rhonchi, rales.  Abdominal: Soft. Bowel sounds are normal, exhibits no ascites, and no tenderness.   Neurological: Alert and " oriented to person, place, and time.   Skin: Skin is warm, dry and intact.   Psychiatric: Has a normal mood and affect. Speech is normal and behavior is normal. Judgment and thought content normal. Cognition and memory are normal.   Nursing note and vitals reviewed.    LABS / EKG        Labs  Lab Results   Component Value Date    WBC 5.78 08/09/2023    HGB 10.2 (L) 08/09/2023    HCT 31.0 (L) 08/09/2023    .3 (H) 08/09/2023     (L) 08/09/2023     Lab Results   Component Value Date    GLUCOSE 109 (H) 08/09/2023    CALCIUM 8.0 (L) 08/09/2023     08/09/2023    K 4.0 08/09/2023    CO2 24 08/09/2023     08/09/2023    BUN 38 (H) 08/09/2023    CREATININE 1.2 08/09/2023           ECG/Telemetry  8/9/23  Atrial fibrillation   Right bundle branch block   T wave abnormality, consider lateral ischemia   Abnormal ECG   When compared with ECG of 08-AUG-2023 06:34,   No significant change was found     ASSESSMENT AND PLAN         Problem: cataract  Plan: Cataract extraction with LRI left eye  Labs: CBC, BMP    Preop examination  Patient for above proposed intervention on 11/16/23  See RCRI calculated below  Additional recommendations as outlined     Ischemic congestive cardiomyopathy  Follows with Dr. Jeffrey cardiology OV 9/5/23  Plavix, torseide, KCL  1/23: TTE EF = 40-45%; ASM; severely dilated RV; severe TR; large left pleural effusion; RVSP calculated at 31 mmHg which is likely underestimated; IVC is very dilated at 5 cm  -CABG in 1996; adenosine MPI negative for ischemia in 6/13    Hypothyroid  Continue home Synthroid perioperatively.    Afib  Follows with Dr. Jeffrey cardiology OV 9/5/23  The patient has atrial fibrillation.  The patient not on anticoagulation.    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   The Revised Cardiac Risk Index (RCRI) = 1 for this patient which indicates a 0.9% risk of major adverse cardiac event in the perioperative period for this low risk procedure.     Further comments:  Resume supplements when OK with surgical team.  I would encourage incentive spirometry to assist with minimizing kathleen-operative pulmonary risk.  DVT prophylaxis and timing of such per the discretion of the surgeon.     Further comments:  STOP-BANG screening for obstructive sleep apnea = 3, which indicates the patient is at intermediate risk for having underlying RONNIE.      HEIDI Stuart  11/2/2023

## 2023-11-02 NOTE — PRE-PROCEDURE INSTRUCTIONS
1. You will be called between 3pm-7pm one business day before your procedure to tell you where and when to report. If you do not receive a phone call by 7pm, please call the Admissions office at [phone].    2. Please report to Surgery Registration on the day of your procedure. You can park in the [facility], enter the front doors of the new Pavilion, and take the [place] elevators to the second floor. Follow signs to Surgery Registration.       3. Please follow the following fasting guidelines:     No solid food EIGHT HOURS prior to arrival time on day of surgery.  Unlimited clear liquids, meaning water or PLAIN black coffee WITHOUT any milk, cream, sugar, or sweetener are permitted up to TWO HOURS prior to arrival at the hospital.    4. Please take ONLY the following medications with a sip of water on the morning of your procedure: escitalopram, levothyroxine, omeprazole    5. Other Instructions: You may brush your teeth the morning of the procedure. Rinse and spit, do not swallow.  Bring a list of your medications with dosages.  Use surgical wash as directed. SHOWER NIGHT BEFORE AND MORNING OF PROCEDURE WITH DIAL SOAP    6. If you develop a cold, cough, fever, rash, or other symptom prior to the day of the procedure, please report it to your physician immediately.    7. If you need to cancel the procedure for any reason, please contact your physician.    8. Make arrangements to have safe transportation home accompanied by a responsible adult. If you have not arranged safe transportation home, your surgery will be cancelled. Safe transportation may include private vehicle, ride-share service, taxi and public transportation when accompanied by a responsible adult who will assist you home. A responsible adult is someone known to you and does not include the taxi, ride-share or public transit drive transporting you.    9.  If it is medically necessary for you to have a longer stay, you will be informed as soon as  the decision is made.    10. Only bring essential items to the hospital.  Do not wear or bring anything of value to the hospital including jewelry of any kind, money, or wallet. Do not wear make-up or contact lenses.  DO NOT BRING MEDICATIONS FROM HOME unless instructed to do so. DO bring your hearing aids, glasses, and a case    11. No lotion, creams, powders, or oils on skin the morning of procedure     12. Dress in comfortable clothes.    13.  If instructed, please bring a copy of your Advanced Directive (Living Will/Durable Power of ) on the day of your procedure.     14. Ensuring your safety at all times is a very important part of our [facility] Culture of Safety. After having surgery and sedation, you are at risk for falling and balance issues. Although you may feel awake, the effects of the medication can last up to 24 hours after anesthesia. If you need to use the bathroom during your recovery period, nursing staff will escort you there and stay with you to ensure your safety.    15. Refrain from drinking alcohol and smoking cigarettes for 24 hours prior to surgery.    16. Shower with antibacterial soap (Dial) the night before and morning of your procedure.  If your procedure indicates the need for CHG antiseptic wash (Bactoshield or Hibiclens), please use this instead and follow instructions as discussed at the time of your Pre-Admission Testing phone interview or visit.    Above instructions reviewed with patient and patient acknowledges understanding.    Form explained by: HEIDI Stuart

## 2023-11-07 NOTE — H&P (VIEW-ONLY)
ASSESSMENT/PLAN     1. Ischemic congestive cardiomyopathy -  - 1/23: TTE EF = 40-45%; ASM; severely dilated RV; severe TR; large left pleural effusion; RVSP calculated at 31 mmHg which is likely underestimated; IVC very dilated= 5 cm  -CABG in 1996; adenosine MPI negative for ischemia in 6/13  Encounter Date: 11/07/23   ECG 12 lead    Narrative    Afib, 71 bpm, RBBB, inf axis   11/23 BUN/CR/K= 25/1.4/4.2, Qk=331 Hg=10.2, UJG=759  1/18/23 TSH=18 (levothyroxine 137)  2/23 TSH=0.7  -plavix 75, torsemide 10 mg qd, KCL 40 bid    A-no changes; prn extra torsemide if sob  B- RTC in 3 months  C- risk for major CV event at time of cataract surgery is low; reasonable to hold Plavix for 7 days pre-op    Wt Readings from Last 4 Encounters:   09/05/23 74.8 kg (165 lb)   07/11/23 77.1 kg (170 lb)   05/23/23 79.8 kg (176 lb)   04/11/23 79.8 kg (176 lb)     2.Chronic atrial fibrillation -  - Hx of rectal bleeding, followed by GI at [facility]  - no recurrence  - He considered NISH closure device and has decided to not pursue.  Encounter Date: 11/07/23   ECG 12 lead    Narrative    Afib, 71 bpm, RBBB, inf axis     a- no changes    3. Dyslipidemia -    a- no atorvastatin    4. Hypertensive disorder -  BP Readings from Last 3 Encounters:   11/07/23 109/64   09/05/23 124/76   07/11/23 110/60     See above    SUBJECTIVE   The patient presents today for follow-up cardiac evaluation.    RE: CAD, afib  - living at home with 24/7 aid since early May 2023  - no sob or edema; mainly in a chair; occasional orthopnea  - colostomy for life (very bothersome)    PMHx: - CABG - 1996, chronic afib (recurrent GI bleeding on anticoag) , Eliquis (9/16); 9/17- no bleeding; gastric erosions; hiatal and para-esophageal hernia; 5 colonic polypectomies- severe diverticulosis (planning on capsule endo) - chronic transaminase elevation; 3/18- anemia due to diverticular bleed manifested as SOB- Hg=10; after iron, SOLIS improved - CHADSVASC score = 5% on ASA 81 mg  -   diverticular bleeding  - : diverticular bleed- not hospitalized; never consulted a physician  - 10/31 admission-[facility]; presented with near syncope and chest pain; cardiac enzymes abnormal; troponin level increased over 30,000 was not on anticoagulation prior to admission; was placed on heparin drip; diagnosed with non-ST elevation myocardial infarction; runs of nonsustained VT; echo showed EF = 40; severely dilated RV/RA/IVC; severe TR; flattened septum  Started on Plavix; no ischemic evaluation performed (was unlikely to undergo cath)  Abdominal ultrasound showed dilated IVC and hepatic congestion  Pulmonary nodule seen which should be followed  - : fell fractured hip; surgery at [facility]; rehab at [place]; now at home  - : cataract surgery at [place]    SHx: - 2016: wife, Opal,  (cancer of unknown primary)    CV Proc:  : TTE EF = 40-45%; ASM; severely dilated RV; severe TR; large left pleural effusion; RVSP calculated at 31 mmHg which is likely underestimated; IVC very dilated= 5 cm  : TTE EF=45-50%, mod dil RV/RA/IVC; PASP inaccurate, severe TR  10/21: Echo ([facility]); EF =40%; flattened septum; dilated RA, RV, IVC;   Echo EF 60% Dilated LA, AS, Mild TR  : CPET (cycled to workload of 80 walton (predicted 150) peak VO2 = 70% limitation is non-cardiopulmonary, normal anerobic threshold indicates good cardiac function.)  : Echo (EF = 55%; PASP = 40 mmHg; JOSE CARLOS; mod TR; Ao root = 4.0)  10/16: Abd Ao Duplex (Normal)  : Umang MPI (EF 0.66, No evidence of myocardial scar or ischemia.)       Past Surgical History       Past Surgical History:   Procedure Laterality Date    CHOLECYSTECTOMY      COLONOSCOPY      CORONARY ARTERY BYPASS GRAFT      THYROIDECTOMY      TONSILLECTOMY      TOTAL KNEE ARTHROPLASTY      UPPER GASTROINTESTINAL ENDOSCOPY          Family History     Family History   Problem Relation Name Age of Onset    Congenital heart disease  Mother Mrs. Elena     Cancer Father Mr. Elena     Breast cancer Sister      Diabetes Brother            Social History      reports that he has never smoked. He has never been exposed to tobacco smoke. He has never used smokeless tobacco. He reports that he does not use drugs. No history on file for alcohol use.     OBJECTIVE     Vitals  Visit Vitals  /64 (BP Location: Left arm, Patient Position: Sitting, BP Cuff Size: Adult)   Pulse 71      Physical Exam  N- supple, V waves noted  C- irreg  L- CTA; decreased L base  E- trace edema    Current Outpatient Medications on File Prior to Visit   Medication Sig Dispense Refill    acetaminophen (TYLENOL) 500 mg tablet Take 325 mg by mouth every 4 (four) hours if needed for mild pain.      ascorbic acid (VITAMIN C) 250 mg tablet Take 2 tablets (500 mg total) by mouth 1 (one) time each day.      calcium carbonate (TUMS) 500 mg (200 mg elemental calcium) chewable tablet Chew 1 tablet (500 mg total) 2 (two) times a day.      capsaicin-menthol (Salonpas, capsaicin-menthol,) 0.025-1.25 % adhesive patch,medicated Apply 2 patches topically 1 (one) time each day.      clopidogreL (PLAVIX) 75 mg tablet Take 1 tablet (75 mg total) by mouth 1 (one) time each day.      cyanocobalamin (VITAMIN B-12) 500 mcg tablet Take 1 tablet (500 mcg total) by mouth 1 (one) time each day.      escitalopram (LEXAPRO) 5 mg tablet Take 1 tablet (5 mg total) by mouth 1 (one) time each day.      ipratropium-albuteroL (DUONEB) 0.5-2.5 mg/3 mL nebulizer solution Take 3 mL by nebulization every 6 (six) hours if needed for wheezing or shortness of breath.      levothyroxine (SYNTHROID, LEVOTHROID) 137 mcg tablet Take 1 tablet (137 mcg total) by mouth 1 (one) time each day before breakfast.      magnesium oxide (MAG-OX) 400 mg magnesium tablet Take 1 tablet (400 mg total) by mouth 2 (two) times a day.      melatonin 3 mg tablet Take 1 tablet (3 mg total) by mouth at bedtime.       multivitamin tablet Take 1 tablet by mouth 1 (one) time each day.      omeprazole (PriLOSEC) 20 mg DR capsule Take 1 capsule (20 mg total) by mouth 1 (one) time each day. Do not crush or chew.      potassium chloride (KLOR-CON M20) 20 mEq CR tablet Take 2 tablets (40 mEq total) by mouth 2 (two) times a day. Tablet may be swallowed whole (do not crush/chew/suck on) OR broken in half and each half swallowed separately OR dissolved (whole tablet) in ~4 ounces of water (allow ~2 minutes to dissolve, stir well and administer immediately).      thiamine (VITAMIN B-1) 50 mg tablet Take 1 tablet (50 mg total) by mouth 1 (one) time each day.      torsemide (DEMADEX) 10 mg tablet Take 1 tablet (10 mg total) by mouth 1 (one) time each day. 90 tablet 1     No current facility-administered medications on file prior to visit.

## 2023-11-15 RX ORDER — ACETAMINOPHEN 325 MG/1
650 TABLET ORAL
Status: CANCELLED | OUTPATIENT
Start: 2023-11-16 | End: 2023-11-16

## 2023-11-15 RX ORDER — CODEINE SULFATE 15 MG/1
30 TABLET ORAL EVERY 6 HOURS PRN
Status: CANCELLED | OUTPATIENT
Start: 2023-11-16

## 2023-11-16 ENCOUNTER — HOSPITAL ENCOUNTER (OUTPATIENT)
Facility: HOSPITAL | Age: 88
Setting detail: HOSPITAL OUTPATIENT SURGERY
Discharge: HOME | End: 2023-11-16
Attending: OPHTHALMOLOGY | Admitting: OPHTHALMOLOGY
Payer: MEDICARE

## 2023-11-16 ENCOUNTER — ANESTHESIA (OUTPATIENT)
Dept: OPERATING ROOM | Facility: HOSPITAL | Age: 88
Setting detail: HOSPITAL OUTPATIENT SURGERY
End: 2023-11-16
Payer: MEDICARE

## 2023-11-16 VITALS
TEMPERATURE: 97 F | OXYGEN SATURATION: 94 % | SYSTOLIC BLOOD PRESSURE: 117 MMHG | DIASTOLIC BLOOD PRESSURE: 66 MMHG | BODY MASS INDEX: 22.26 KG/M2 | WEIGHT: 168 LBS | RESPIRATION RATE: 16 BRPM | HEART RATE: 68 BPM | HEIGHT: 73 IN

## 2023-11-16 PROCEDURE — 08RK3JZ REPLACEMENT OF LEFT LENS WITH SYNTHETIC SUBSTITUTE, PERCUTANEOUS APPROACH: ICD-10-PCS | Performed by: OPHTHALMOLOGY

## 2023-11-16 PROCEDURE — 63600000 HC DRUGS/DETAIL CODE: Performed by: NURSE ANESTHETIST, CERTIFIED REGISTERED

## 2023-11-16 PROCEDURE — V2632 POST CHMBR INTRAOCULAR LENS: HCPCS | Performed by: OPHTHALMOLOGY

## 2023-11-16 PROCEDURE — 71000002 HC PACU PHASE 2 INITIAL 30MIN: Performed by: OPHTHALMOLOGY

## 2023-11-16 PROCEDURE — 36000014 HC OR LEVEL 4 EA ADDL MIN: Performed by: OPHTHALMOLOGY

## 2023-11-16 PROCEDURE — 27200000 HC STERILE SUPPLY: Performed by: OPHTHALMOLOGY

## 2023-11-16 PROCEDURE — 25000000 HC PHARMACY GENERAL: Performed by: OPHTHALMOLOGY

## 2023-11-16 PROCEDURE — 37000002 HC ANESTHESIA MAC: Performed by: OPHTHALMOLOGY

## 2023-11-16 PROCEDURE — 63600000 HC DRUGS/DETAIL CODE: Mod: JW | Performed by: OPHTHALMOLOGY

## 2023-11-16 PROCEDURE — 71000012 HC PACU PHASE 2 EA ADDL MIN: Performed by: OPHTHALMOLOGY

## 2023-11-16 PROCEDURE — 63700000 HC SELF-ADMINISTRABLE DRUG: Performed by: OPHTHALMOLOGY

## 2023-11-16 PROCEDURE — 36000004 HC OR LEVEL 4 INITIAL 30MIN: Performed by: OPHTHALMOLOGY

## 2023-11-16 DEVICE — IMPLANTABLE DEVICE: Type: IMPLANTABLE DEVICE | Site: EYE | Status: FUNCTIONAL

## 2023-11-16 RX ORDER — ONDANSETRON HYDROCHLORIDE 2 MG/ML
4 INJECTION, SOLUTION INTRAVENOUS
Status: CANCELLED | OUTPATIENT
Start: 2023-11-16

## 2023-11-16 RX ORDER — SODIUM CHLORIDE, SODIUM LACTATE, POTASSIUM CHLORIDE, CALCIUM CHLORIDE 600; 310; 30; 20 MG/100ML; MG/100ML; MG/100ML; MG/100ML
INJECTION, SOLUTION INTRAVENOUS CONTINUOUS
Status: DISCONTINUED | OUTPATIENT
Start: 2023-11-16 | End: 2023-11-16 | Stop reason: HOSPADM

## 2023-11-16 RX ORDER — FENTANYL CITRATE 50 UG/ML
INJECTION, SOLUTION INTRAMUSCULAR; INTRAVENOUS AS NEEDED
Status: DISCONTINUED | OUTPATIENT
Start: 2023-11-16 | End: 2023-11-16 | Stop reason: SURG

## 2023-11-16 RX ORDER — POVIDONE-IODINE 5 %
SOLUTION, NON-ORAL OPHTHALMIC (EYE)
Status: DISCONTINUED | OUTPATIENT
Start: 2023-11-16 | End: 2023-11-16 | Stop reason: HOSPADM

## 2023-11-16 RX ORDER — ACETAZOLAMIDE 250 MG/1
500 TABLET ORAL ONCE
Status: COMPLETED | OUTPATIENT
Start: 2023-11-16 | End: 2023-11-16

## 2023-11-16 RX ORDER — TETRACAINE HYDROCHLORIDE 5 MG/ML
SOLUTION OPHTHALMIC
Status: DISCONTINUED | OUTPATIENT
Start: 2023-11-16 | End: 2023-11-16 | Stop reason: HOSPADM

## 2023-11-16 RX ORDER — CYCLOPENTOLAT/TROPIC/PHENYLEPH 1%-1%-2.5%
1 DROPS (EA) OPHTHALMIC (EYE)
Status: DISPENSED | OUTPATIENT
Start: 2023-11-16 | End: 2023-11-16

## 2023-11-16 RX ORDER — EPINEPHRINE 1 MG/ML
INJECTION, SOLUTION INTRAMUSCULAR; SUBCUTANEOUS
Status: DISCONTINUED | OUTPATIENT
Start: 2023-11-16 | End: 2023-11-16 | Stop reason: HOSPADM

## 2023-11-16 RX ORDER — FENTANYL CITRATE 50 UG/ML
50 INJECTION, SOLUTION INTRAMUSCULAR; INTRAVENOUS EVERY 5 MIN PRN
Status: CANCELLED | OUTPATIENT
Start: 2023-11-16

## 2023-11-16 RX ORDER — DEXTROSE 40 %
15-30 GEL (GRAM) ORAL AS NEEDED
Status: CANCELLED | OUTPATIENT
Start: 2023-11-16

## 2023-11-16 RX ORDER — PHENYLEPHRINE HCL IN 0.9% NACL 1 MG/10 ML
SYRINGE (ML) INTRAVENOUS AS NEEDED
Status: DISCONTINUED | OUTPATIENT
Start: 2023-11-16 | End: 2023-11-16 | Stop reason: SURG

## 2023-11-16 RX ORDER — MOXIFLOXACIN 5 MG/ML
SOLUTION/ DROPS OPHTHALMIC
Status: DISCONTINUED | OUTPATIENT
Start: 2023-11-16 | End: 2023-11-16 | Stop reason: HOSPADM

## 2023-11-16 RX ORDER — DEXTROSE 50 % IN WATER (D50W) INTRAVENOUS SYRINGE
25 AS NEEDED
Status: CANCELLED | OUTPATIENT
Start: 2023-11-16

## 2023-11-16 RX ORDER — IBUPROFEN 200 MG
16-32 TABLET ORAL AS NEEDED
Status: CANCELLED | OUTPATIENT
Start: 2023-11-16

## 2023-11-16 RX ADMIN — FENTANYL CITRATE 12.5 MCG: 50 INJECTION, SOLUTION INTRAMUSCULAR; INTRAVENOUS at 12:16

## 2023-11-16 RX ADMIN — FENTANYL CITRATE 12.5 MCG: 50 INJECTION, SOLUTION INTRAMUSCULAR; INTRAVENOUS at 13:03

## 2023-11-16 RX ADMIN — FENTANYL CITRATE 25 MCG: 50 INJECTION, SOLUTION INTRAMUSCULAR; INTRAVENOUS at 11:58

## 2023-11-16 RX ADMIN — Medication 50 MCG: at 12:34

## 2023-11-16 RX ADMIN — FENTANYL CITRATE 25 MCG: 50 INJECTION, SOLUTION INTRAMUSCULAR; INTRAVENOUS at 12:06

## 2023-11-16 RX ADMIN — ACETAZOLAMIDE 500 MG: 250 TABLET ORAL at 10:40

## 2023-11-16 RX ADMIN — Medication 1 DROP: at 10:40

## 2023-11-16 RX ADMIN — SODIUM CHLORIDE, POTASSIUM CHLORIDE, SODIUM LACTATE AND CALCIUM CHLORIDE: 600; 310; 30; 20 INJECTION, SOLUTION INTRAVENOUS at 10:54

## 2023-11-16 RX ADMIN — FENTANYL CITRATE 25 MCG: 50 INJECTION, SOLUTION INTRAMUSCULAR; INTRAVENOUS at 11:50

## 2023-11-16 ASSESSMENT — ENCOUNTER SYMPTOMS: DYSRHYTHMIAS: 1

## 2023-11-16 NOTE — OP NOTE
POST-OP note    Preoperative diagnosis cataract left eye and corneal astigmatism left eye  Postoperative diagnosis: Same  Procedure: Phacoemulsification with insertion of posterior chamber implant clear cornea and no stitch, limbal relaxing incision left eye.  Anterior vitrectomy left eye    Anesthesia: topical  Surgeon: Hayder Moses Jr., M.D. FACS  Assistant: None      The risks benefits and alternatives of surgery were discussed with the patient and family in detail including loss of vision, infection, ptosis, retinal detachment, hemorrhage, pupil abnormalities. He appeared to understand and wished to proceed    In the preop room the a drop of tetracaine was instilled into the left eye.  The 180° axis was marked at the limbus with a marking pen.     The patient was brought to the operating room in stable condition. The eyes were prepped in a sterile fashion using Betadine normal saline paper drapes and tape. 4 drops of tetracaine were instilled in the left eye. A Andrew retractor was placed for lid retraction.  After a Weck-Keyla sponge  was used to dry the cornea, the 180° axis was reinforced with a Sinskey hook with ink.  The 25 degree axis was marked using a double pronged marker and Godfrey ring.  A 35 degree arc was marked.  A 600 micronsquare. vishnu blade was used to make a corneal relaxing incision at the 8 mm clear zone centered at the 35 degree axis.      A 3 mm scored blade was used to make a paracentesis tract at 2:00 The full blade was used to make a 3 staged self sealing incision at 10:00. Amvisc was placed in the anterior chamber. A cystotome handle was used to make a continuous tear anterior capsulotomy. The nucleus was hydrodelineated and hydrodissected and phacoemulsified using a total accumulated phaco time of 4.06 minutes.  Towards the end of the phacoemulsification, the patient moved rupturing the posterior capsule requiring an anterior vitrectomy  to remove vitreous from the anterior chamber.  The entire nucleus was phacoemulsified without complication.  The remaining cortex was aspirated leaving a rim of anterior and posterior capsule.  Amvisc was placed in the anterior chamber and under the iris. A posterior chamber implant model LI 6 1 AV oh power 13.0 diopters was placed in the precapsular space and the haptics were laced at the 3 and 9:00.    The remaining Amvisc was gently aspirated.  Miostat placed in the anterior chamber to ensure wound closure. The wound was tested and found to be watertight. A collagen shield soaked in moxifloxacin was placed over the cornea. A light patch and a Robb shield were applied.    The patient tolerated the procedure well left the operating room in stable condition and was discharged home.

## 2023-11-16 NOTE — ANESTHESIOLOGIST PRE-PROCEDURE ATTESTATION
Pre-Procedure Patient Identification:  I am the Primary Anesthesiologist and have identified the patient on 11/16/23 at 10:37 AM.   I have confirmed the procedure(s) will be performed by the following surgeon/proceduralist Hayder Moses MD.

## 2023-11-16 NOTE — OR SURGEON
Pre-Procedure patient identification:  I am the primary operating surgeon/proceduralist and I have reviewed the applicable pathology reports and radiology studies for this procedure. I have identified the patient and confirmed laterality is left on 11/16/23 at 9:43 AM Hayder Moses MD  Phone Number: [phone]

## 2023-11-16 NOTE — ANESTHESIA PREPROCEDURE EVALUATION
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   (+) Necrotizing soft tissue infection   (+) UTI (urinary tract infection)       Anesthesia ROS/MED HX    Anesthesia History    Previous anesthetics  No history of anesthetic complications  Cardiovascular   Valvular problems/murmurs   CAD   hypertension  Dysrhythmias and atrial fibrillation   CABG   cardiomyopathy or myocarditis (ischemic: EF 45-50%)   Cardiac clearance reviewed, echocardiogram reviewed and ECG reviewed  Hematological    thrombocytopenia   anticoagulants (Last taken Plavix 11/9/2023)   anemia  Renal Disease   chronic renal insufficiency  ROS/MED HX Comments:    Pulmonary: Pleural effusion L   Cardiology: R internal iliac artery aneurysm   ECG: Atrial fibrillation   Right bundle branch block   T wave abnormality, consider lateral ischemia   Abnormal ECG   When compared with ECG of 08-AUG-2023 06:34,   No significant change was found   Confirmed by Bea Peña ([id number]) on 8/9/2023 10:53:41 PM       Past Surgical History:   Procedure Laterality Date    CHOLECYSTECTOMY      CORONARY ARTERY BYPASS GRAFT      JOINT REPLACEMENT Left     Knee    THYROIDECTOMY       LABS:    CBC Results       11/02/23 08/09/23 08/08/23     1531 0512 0543    WBC 4.48 5.78 5.65    RBC 3.22 3.06 3.05    HGB 10.2 10.2 9.8    HCT 33.7 31.0 30.4    .7 101.3 99.7    MCH 31.7 33.3 32.1    MCHC 30.3 32.9 32.2     121 120        BMP Results       11/02/23 08/09/23 08/08/23     1531 0512 0543     137 136    K 4.2 4.0 4.3    Cl 108 105 103    CO2 24 24 25    Glucose 139 109 118    BUN 25 38 40    Creatinine 1.4 1.2 1.4    Calcium 8.7 8.0 8.2    Anion Gap 9 8 8    EGFR 47.4 56.8 47.5        PT/PTT Results       08/03/23 08/23/22  08/22/22     1839 0519 0529    PT 16.6 15.8 15.3    INR 1.4 1.3 1.2    PTT -- 31 --         Comment for INR at 1839 on 08/03/23: Moderate Intensity Anticoagulation = 2.0 to 3.0, High Intensity = 2.5 to 3.5    Comment for INR at 0519 on 08/23/22: Moderate Intensity Anticoagulation = 2.0 to 3.0, High Intensity = 2.5 to 3.5    Comment for INR at 0529 on 08/22/22: Moderate Intensity Anticoagulation = 2.0 to 3.0, High Intensity = 2.5 to 3.5                Lab Results   Component Value Date    LABANTI Negative 08/02/2022    ABO O 08/02/2022    LABRH Positive 08/02/2022    HISTCK Previous type on file 08/02/2022          IMAGING:    Cardiac Imaging    TRANSTHORACIC ECHO (TTE) COMPLETE 08/04/2023    Interpretation Summary  Technically difficult study. Patient in atrial fibrillation with rates 75-85 bpm at time of examination.    The left ventricular cavity size is small due to compression from RV with normal wall thickness and mildly reduced systolic function. Estimated EF 45 to 50 %. D shaped left ventricular cavity in the setting of severe RVoverload Unable to assess diastolic function due to atrial arrhythmia.    The aortic valve is tricuspid. Aortic valve and root sclerosis. Mild aortic regurgitation.    The visualized portion of the aortic root is normal in size.    The mitral valve is thickened. Mild mitral annular calcification. Mild mitral regurgitation.    The left atrium is mildly dilated    The right ventricular cavity is severely dilated with severely decreased systolic function. Nunes's sign is present.    The pulmonic valve is not well seen.    Severely dilated tricuspid valve annulus with tricuspid leaflets displaying a large coaptation gap; approximately 1 cm Severe tricuspid regurgitation with an estimated RVSP of at least 35 mmHg, but likely likely much higher in the setting of severe TR.  Diastolic flow reversal in the hepatic veins.    The right atrium is severely dilated.    The IVC is massively  dilated and collapses < 50% with inspiration consistent with severely elevated right atrial pressures.    Large pleural effusion; cannot exclude a pericardial component.  No obvious chamber collapse.  Compared to previous echocardiogram from 8/4/22, no significant changes. Results communicated to primary team.        Physical Exam    Anesthesia Plan    Plan: MAC    Technique: MAC and total IV anesthesia     Lines and Monitors: PIV     Airway: natural airway / supplemental oxygen   3 ASA  Anesthetic plan and risks discussed with: patient  Induction:    intravenous   Postop Plan:   Patient Disposition: phase II then home   Pain Management: IV analgesics

## 2023-11-16 NOTE — ANESTHESIA POSTPROCEDURE EVALUATION
Patient: Samy Elena    Procedure Summary     Date: 11/16/23 Room / Location:  [facility] OR 02 / [facility]    Anesthesia Start: 1144 Anesthesia Stop: 1318    Procedure: Cataract extraction with LRI and anterior vitrectomy left eye (Left: Eye) Diagnosis:       Nuclear sclerotic cataract of left eye      Regular astigmatism of left eye      (cataract left eye)      (astigmatism left eye)    Surgeons: Hayder Moses MD Responsible Provider: Mary Schneider MD    Anesthesia Type: MAC ASA Status: 3          Anesthesia Type: MAC  PACU Vitals  11/16/2023 1309 - 11/16/2023 1336      11/16/2023  1323             BP: 127/80    Pulse: 76    Resp: 16    SpO2: 97 %            Anesthesia Post Evaluation    Pain management: satisfactory to patient  Mode of pain management: IV medication  Patient location during evaluation: PACU  Patient participation: complete - patient participated  Level of consciousness: awake  Cardiovascular status: acceptable and hemodynamically stable  Airway Patency: adequate and patent  Respiratory status: acceptable, nonlabored ventilation, spontaneous ventilation and nasal cannula  Hydration status: stable  Anesthetic complications: no

## 2023-11-29 ENCOUNTER — HOSPITAL ENCOUNTER (INPATIENT)
Facility: HOSPITAL | Age: 88
LOS: 8 days | Discharge: HOME HEALTH | DRG: 291 | End: 2023-12-07
Attending: EMERGENCY MEDICINE | Admitting: HOSPITALIST
Payer: MEDICARE

## 2023-11-29 ENCOUNTER — APPOINTMENT (EMERGENCY)
Dept: RADIOLOGY | Facility: HOSPITAL | Age: 88
DRG: 291 | End: 2023-11-29
Attending: EMERGENCY MEDICINE
Payer: MEDICARE

## 2023-11-29 DIAGNOSIS — R94.31 QT PROLONGATION: ICD-10-CM

## 2023-11-29 DIAGNOSIS — J90 PLEURAL EFFUSION ON LEFT: Primary | ICD-10-CM

## 2023-11-29 DIAGNOSIS — I50.9 ACUTE CONGESTIVE HEART FAILURE, UNSPECIFIED HEART FAILURE TYPE (CMS/HCC): ICD-10-CM

## 2023-11-29 DIAGNOSIS — R07.9 CHEST PAIN, UNSPECIFIED TYPE: ICD-10-CM

## 2023-11-29 DIAGNOSIS — I50.23 ACUTE ON CHRONIC SYSTOLIC (CONGESTIVE) HEART FAILURE: ICD-10-CM

## 2023-11-29 PROBLEM — F32.A DEPRESSION: Status: ACTIVE | Noted: 2023-11-29

## 2023-11-29 PROBLEM — R05.9 COUGH: Status: ACTIVE | Noted: 2023-11-29

## 2023-11-29 PROBLEM — E03.9 HYPOTHYROIDISM: Status: ACTIVE | Noted: 2023-11-29

## 2023-11-29 PROBLEM — E78.5 HYPERLIPIDEMIA: Status: ACTIVE | Noted: 2023-11-29

## 2023-11-29 PROBLEM — I48.91 ATRIAL FIBRILLATION (CMS/HCC): Status: ACTIVE | Noted: 2023-11-29

## 2023-11-29 PROBLEM — N39.0 UTI (URINARY TRACT INFECTION): Status: ACTIVE | Noted: 2023-11-29

## 2023-11-29 LAB
ALBUMIN SERPL-MCNC: 3.4 G/DL (ref 3.5–5.7)
ALP SERPL-CCNC: 110 IU/L (ref 34–125)
ALT SERPL-CCNC: 8 IU/L (ref 7–52)
ANION GAP SERPL CALC-SCNC: 5 MEQ/L (ref 3–15)
AST SERPL-CCNC: 42 IU/L (ref 13–39)
ATRIAL RATE: 187
BASOPHILS # BLD: 0.05 K/UL (ref 0.01–0.1)
BASOPHILS NFR BLD: 0.9 %
BILIRUB SERPL-MCNC: 1 MG/DL (ref 0.3–1.2)
BNP SERPL-MCNC: 467 PG/ML
BUN SERPL-MCNC: 27 MG/DL (ref 7–25)
CALCIUM SERPL-MCNC: 9 MG/DL (ref 8.6–10.3)
CHLORIDE SERPL-SCNC: 112 MEQ/L (ref 98–107)
CO2 SERPL-SCNC: 26 MEQ/L (ref 21–31)
CREAT SERPL-MCNC: 1.5 MG/DL (ref 0.7–1.3)
DIFFERENTIAL METHOD BLD: ABNORMAL
EOSINOPHIL # BLD: 0.15 K/UL (ref 0.04–0.54)
EOSINOPHIL NFR BLD: 2.8 %
ERYTHROCYTE [DISTWIDTH] IN BLOOD BY AUTOMATED COUNT: 17.4 % (ref 11.6–14.4)
GFR SERPL CREATININE-BSD FRML MDRD: 43.8 ML/MIN/1.73M*2
GLUCOSE SERPL-MCNC: 150 MG/DL (ref 70–99)
HCT VFR BLDCO AUTO: 37.4 % (ref 40.1–51)
HGB BLD-MCNC: 11.4 G/DL (ref 13.7–17.5)
IMM GRANULOCYTES # BLD AUTO: 0.01 K/UL (ref 0–0.08)
IMM GRANULOCYTES NFR BLD AUTO: 0.2 %
LYMPHOCYTES # BLD: 0.75 K/UL (ref 1.2–3.5)
LYMPHOCYTES NFR BLD: 14.1 %
MCH RBC QN AUTO: 31.8 PG (ref 28–33.2)
MCHC RBC AUTO-ENTMCNC: 30.5 G/DL (ref 32.2–36.5)
MCV RBC AUTO: 104.2 FL (ref 83–98)
MONOCYTES # BLD: 0.65 K/UL (ref 0.3–1)
MONOCYTES NFR BLD: 12.2 %
NEUTROPHILS # BLD: 3.72 K/UL (ref 1.7–7)
NEUTS SEG NFR BLD: 69.8 %
NRBC BLD-RTO: 0 %
PDW BLD AUTO: 11.2 FL (ref 9.4–12.4)
PLATELET # BLD AUTO: 122 K/UL (ref 150–350)
POTASSIUM SERPL-SCNC: 4.3 MEQ/L (ref 3.5–5.1)
PROT SERPL-MCNC: 8.5 G/DL (ref 6–8.2)
QRS DURATION: 124
QT INTERVAL: 476
QTC CALCULATION(BAZETT): 528
R AXIS: 72
RBC # BLD AUTO: 3.59 M/UL (ref 4.5–5.8)
SODIUM SERPL-SCNC: 143 MEQ/L (ref 136–145)
T WAVE AXIS: -23
T4 FREE SERPL-MCNC: 1.89 NG/DL (ref 0.58–1.64)
TROPONIN I SERPL HS-MCNC: 23.4 PG/ML
TROPONIN I SERPL HS-MCNC: 24.8 PG/ML
TSH SERPL DL<=0.05 MIU/L-ACNC: 0.02 MIU/L (ref 0.34–5.6)
VENTRICULAR RATE: 74
WBC # BLD AUTO: 5.33 K/UL (ref 3.8–10.5)

## 2023-11-29 PROCEDURE — 63600000 HC DRUGS/DETAIL CODE: Mod: JZ | Performed by: EMERGENCY MEDICINE

## 2023-11-29 PROCEDURE — 84443 ASSAY THYROID STIM HORMONE: CPT

## 2023-11-29 PROCEDURE — 84484 ASSAY OF TROPONIN QUANT: CPT | Performed by: EMERGENCY MEDICINE

## 2023-11-29 PROCEDURE — 99285 EMERGENCY DEPT VISIT HI MDM: CPT | Mod: 25

## 2023-11-29 PROCEDURE — 36415 COLL VENOUS BLD VENIPUNCTURE: CPT | Performed by: EMERGENCY MEDICINE

## 2023-11-29 PROCEDURE — 71046 X-RAY EXAM CHEST 2 VIEWS: CPT

## 2023-11-29 PROCEDURE — 63700000 HC SELF-ADMINISTRABLE DRUG

## 2023-11-29 PROCEDURE — 85025 COMPLETE CBC W/AUTO DIFF WBC: CPT | Performed by: EMERGENCY MEDICINE

## 2023-11-29 PROCEDURE — 96374 THER/PROPH/DIAG INJ IV PUSH: CPT

## 2023-11-29 PROCEDURE — 99223 1ST HOSP IP/OBS HIGH 75: CPT | Performed by: HOSPITALIST

## 2023-11-29 PROCEDURE — 83880 ASSAY OF NATRIURETIC PEPTIDE: CPT | Performed by: EMERGENCY MEDICINE

## 2023-11-29 PROCEDURE — 80053 COMPREHEN METABOLIC PANEL: CPT | Performed by: EMERGENCY MEDICINE

## 2023-11-29 PROCEDURE — 93005 ELECTROCARDIOGRAM TRACING: CPT | Performed by: EMERGENCY MEDICINE

## 2023-11-29 PROCEDURE — 84439 ASSAY OF FREE THYROXINE: CPT

## 2023-11-29 PROCEDURE — 93010 ELECTROCARDIOGRAM REPORT: CPT | Performed by: INTERNAL MEDICINE

## 2023-11-29 PROCEDURE — 12000000 HC ROOM AND CARE MED/SURG

## 2023-11-29 RX ORDER — FUROSEMIDE 10 MG/ML
40 INJECTION INTRAMUSCULAR; INTRAVENOUS ONCE
Status: COMPLETED | OUTPATIENT
Start: 2023-11-29 | End: 2023-11-29

## 2023-11-29 RX ORDER — CIPROFLOXACIN 250 MG/1
250 TABLET, FILM COATED ORAL 2 TIMES DAILY
COMMUNITY
End: 2023-12-07 | Stop reason: HOSPADM

## 2023-11-29 RX ORDER — THIAMINE HCL 100 MG
100 TABLET ORAL EVERY MORNING
Status: DISCONTINUED | OUTPATIENT
Start: 2023-11-29 | End: 2023-12-07 | Stop reason: HOSPADM

## 2023-11-29 RX ORDER — CIPROFLOXACIN 500 MG/1
500 TABLET ORAL 2 TIMES DAILY
Status: DISCONTINUED | OUTPATIENT
Start: 2023-11-29 | End: 2023-11-29

## 2023-11-29 RX ORDER — CALCIUM CARBONATE 200(500)MG
500 TABLET,CHEWABLE ORAL 2 TIMES DAILY
Status: DISCONTINUED | OUTPATIENT
Start: 2023-11-29 | End: 2023-12-07 | Stop reason: HOSPADM

## 2023-11-29 RX ORDER — CLOPIDOGREL BISULFATE 75 MG/1
75 TABLET ORAL EVERY MORNING
Status: DISCONTINUED | OUTPATIENT
Start: 2023-11-30 | End: 2023-12-07 | Stop reason: HOSPADM

## 2023-11-29 RX ORDER — ESCITALOPRAM OXALATE 5 MG/1
5 TABLET ORAL EVERY MORNING
Status: DISCONTINUED | OUTPATIENT
Start: 2023-11-29 | End: 2023-12-07 | Stop reason: HOSPADM

## 2023-11-29 RX ORDER — FUROSEMIDE 10 MG/ML
40 INJECTION INTRAMUSCULAR; INTRAVENOUS
Status: DISCONTINUED | OUTPATIENT
Start: 2023-11-30 | End: 2023-12-07 | Stop reason: HOSPADM

## 2023-11-29 RX ORDER — PANTOPRAZOLE SODIUM 20 MG/1
20 TABLET, DELAYED RELEASE ORAL DAILY
Status: DISCONTINUED | OUTPATIENT
Start: 2023-11-29 | End: 2023-12-07 | Stop reason: HOSPADM

## 2023-11-29 RX ORDER — CYANOCOBALAMIN (VITAMIN B-12) 500 MCG
3 TABLET ORAL NIGHTLY
Status: DISCONTINUED | OUTPATIENT
Start: 2023-11-29 | End: 2023-12-07 | Stop reason: HOSPADM

## 2023-11-29 RX ORDER — BIMATOPROST 0.1 MG/ML
1 SOLUTION/ DROPS OPHTHALMIC NIGHTLY
COMMUNITY

## 2023-11-29 RX ORDER — TORSEMIDE 10 MG/1
10 TABLET ORAL DAILY
Status: ON HOLD | COMMUNITY
End: 2023-12-07

## 2023-11-29 RX ORDER — MIRTAZAPINE 15 MG/1
15 TABLET, FILM COATED ORAL NIGHTLY
COMMUNITY
End: 2023-12-07 | Stop reason: HOSPADM

## 2023-11-29 RX ORDER — LOTEPREDNOL ETABONATE 5 MG/G
1 OINTMENT OPHTHALMIC 4 TIMES DAILY
COMMUNITY
End: 2024-07-28

## 2023-11-29 RX ORDER — PNV NO.95/FERROUS FUM/FOLIC AC 28MG-0.8MG
500 TABLET ORAL EVERY MORNING
Status: DISCONTINUED | OUTPATIENT
Start: 2023-11-29 | End: 2023-12-07 | Stop reason: HOSPADM

## 2023-11-29 RX ORDER — ASCORBIC ACID 250 MG
500 TABLET ORAL EVERY MORNING
Status: DISCONTINUED | OUTPATIENT
Start: 2023-11-30 | End: 2023-12-07 | Stop reason: HOSPADM

## 2023-11-29 RX ORDER — TOBRAMYCIN 3 MG/ML
1 SOLUTION/ DROPS OPHTHALMIC DAILY
COMMUNITY
End: 2024-03-26

## 2023-11-29 RX ORDER — LANOLIN ALCOHOL/MO/W.PET/CERES
400 CREAM (GRAM) TOPICAL 2 TIMES DAILY
Status: DISCONTINUED | OUTPATIENT
Start: 2023-11-29 | End: 2023-12-07 | Stop reason: HOSPADM

## 2023-11-29 RX ADMIN — CYANOCOBALAMIN TAB 1000 MCG 500 MCG: 1000 TAB at 20:41

## 2023-11-29 RX ADMIN — ESCITALOPRAM OXALATE 5 MG: 5 TABLET, FILM COATED ORAL at 20:41

## 2023-11-29 RX ADMIN — MAGNESIUM OXIDE TAB 400 MG (241.3 MG ELEMENTAL MG) 400 MG: 400 (241.3 MG) TAB at 20:41

## 2023-11-29 RX ADMIN — PANTOPRAZOLE SODIUM 20 MG: 20 TABLET, DELAYED RELEASE ORAL at 20:41

## 2023-11-29 RX ADMIN — Medication 3 MG: at 22:17

## 2023-11-29 RX ADMIN — MULTIPLE VITAMINS W/ MINERALS TAB 1 TABLET: TAB at 20:41

## 2023-11-29 RX ADMIN — LEVOTHYROXINE SODIUM 137 MCG: 0.11 TABLET ORAL at 20:41

## 2023-11-29 RX ADMIN — FUROSEMIDE 40 MG: 10 INJECTION, SOLUTION INTRAMUSCULAR; INTRAVENOUS at 17:27

## 2023-11-29 RX ADMIN — ANTACID TABLETS 500 MG: 500 TABLET, CHEWABLE ORAL at 22:17

## 2023-11-29 RX ADMIN — CIPROFLOXACIN HYDROCHLORIDE 500 MG: 500 TABLET, FILM COATED ORAL at 20:41

## 2023-11-29 RX ADMIN — THIAMINE HCL TAB 100 MG 100 MG: 100 TAB at 20:41

## 2023-11-29 NOTE — ASSESSMENT & PLAN NOTE
Previously had been on atorvastatin 40 mg, last dispense for 30 tablets in 12/2022  -Hold on admission seeing likely not taking  -Check lipid panel for completeness in the morning

## 2023-11-29 NOTE — ASSESSMENT & PLAN NOTE
Follows with Dr. Jeffrey cardiology  -CABG in 1996; adenosine MPI negative for ischemia in 6/13  -Maintained on Plavix and torsemide 10 mg daily with as needed doses available for weight gain  -TTE (1/2023): 40-45%; ASM; severely dilated RV; severe TR; large left pleural effusion; RVSP calculated at 31 mmHg which is likely underestimated; IVC is very dilated at 5 cm  12/04/23 appreciate Cards recs! -rec to add metop/valsartan low dose (very low doses tentatively ordered but BP soft, holding valsartan to see AM BP first)

## 2023-11-29 NOTE — ED PROVIDER NOTES
Emergency Medicine Note  HPI   HISTORY OF PRESENT ILLNESS     89 yo male with pmh of afib, CHF, pleural effusion, MI, TIA who presents with shortness of breath and chest pain.  He reports he had a pleural effusion that was drained over the summer.  He has been feeling well then over the past 2 days he has become increasingly short of breath.  Today his caregiver reports that he coughed up some frothy material.  He had some chest pain associated with that that has now resolved.  No fevers or chills, no cough, no leg pain or swelling.      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    Social History     Tobacco Use   • Smoking status: Never   • Smokeless tobacco: Never   Vaping Use   • Vaping Use: Never used   Substance Use Topics   • Alcohol use: Yes     Comment: social   • Drug use: Never         Review of Systems   REVIEW OF SYSTEMS     Review of Systems   Respiratory: Positive for chest tightness and shortness of breath. Negative for cough.          VITALS     ED Vitals    Date/Time Temp Pulse Resp BP SpO2 [facility]   11/30/23 1845 -- 82 22 111/71 95 % [facility]   11/30/23 1800 -- 75 22 111/70 95 % [facility]   11/30/23 1729 -- 78 -- 102/63 -- [facility]   11/30/23 1700 -- 75 16 99/55 -- [facility]   11/30/23 1645 --  72 18 110/55 -- [facility]   11/30/23 1600 -- 72 16 96/54 95 % [facility]   11/30/23 1515 -- -- -- 96/53 -- [facility]   11/30/23 1506 -- 70 16 91/54 97 % [facility]   11/30/23 1502 -- 67 16 80/44 92 % [facility]   11/30/23 1501 -- 71 18 90/50 95 % [facility]   11/30/23 1459 -- 74 18 90/54 96 % [facility]   11/30/23 1455 -- 64 18 95/50 95 % [facility]   11/30/23 1453 -- 68 16 96/55 96 % [facility]   11/30/23 1437 -- 76 16 105/58 96 % [facility]   11/30/23 1300 -- 75 20 109/61 95 % [facility]   11/30/23 1100 -- 79 20 129/74 95 % [facility]   11/30/23 1000 -- 72 18 117/62 97 % [facility]   11/30/23 0600 -- 74 21 102/55 93 % [facility]   11/30/23 0538 -- 77 20 131/66 97 % [facility]   11/30/23 0300 -- 72 20 105/68 94 % S[facility]   11/30/23 0200 -- 73 20 106/58 92 % [facility]   11/30/23 0000 -- 69 18 99/59 94 % [facility]   11/29/23 2300 -- 74 19 126/61 93 % S[facility]   11/29/23 2200 -- 76 20 123/67 96 % KE   11/29/23 2048 -- 79 22 135/71 95 % SVC   11/29/23 1900 -- 82 24 140/69 93 % [facility]   11/29/23 1454 36.4 °C (97.5 °F) 79 20 115/65 96 % JA                       Physical Exam   PHYSICAL EXAM     Physical Exam  Vitals reviewed.   Constitutional:       General: He is not in acute distress.     Appearance: Normal appearance.   HENT:      Nose: Nose normal.   Eyes:      Extraocular Movements: Extraocular movements intact.      Pupils: Pupils are equal, round, and reactive to light.   Cardiovascular:      Rate and Rhythm: Normal rate and regular rhythm.      Pulses: Normal pulses.   Pulmonary:      Effort: Pulmonary effort is normal.      Breath sounds: Decreased air movement present. Examination of the left-middle field reveals decreased breath sounds. Examination of the left-lower field reveals decreased breath sounds. Decreased breath sounds present.   Abdominal:      General: Abdomen is flat.      Tenderness: There is no abdominal tenderness.   Musculoskeletal:         General: Normal range of motion.      Cervical back: Normal range of motion.   Skin:     General: Skin is warm and dry.      Capillary Refill: Capillary refill takes less than 2  seconds.   Neurological:      General: No focal deficit present.      Mental Status: He is alert and oriented to person, place, and time.   Psychiatric:         Mood and Affect: Mood normal.           PROCEDURES     Procedures     DATA     Results     Procedure Component Value Units Date/Time    [facility] Draw Panel [[id number]] Collected: 11/29/23 1459    Specimen: Blood, Venous Updated: 11/29/23 2300    Narrative:      The following orders were created for panel order [facility] Draw Panel.  Procedure                               Abnormality         Status                     ---------                               -----------         ------                     RAINBOW LT BLUE[[id number]]                                  Final result                 Please view results for these tests on the individual orders.    RAINBOW LT BLUE [[id number]] Collected: 11/29/23 1459    Specimen: Blood, Venous Updated: 11/29/23 2300    B-type natriuretic peptide [[id number]]  (Abnormal) Collected: 11/29/23 1459    Specimen: Blood, Venous Updated: 11/29/23 1720      pg/mL     Comprehensive metabolic panel [[id number]]  (Abnormal) Collected: 11/29/23 1459    Specimen: Blood, Venous Updated: 11/29/23 1606     Sodium 143 mEQ/L      Potassium 4.3 mEQ/L      Comment: Results obtained on plasma. Plasma Potassium values may be up to 0.4 mEQ/L less than serum values. The differences may be greater for patients with high platelet or white cell counts.        Chloride 112 mEQ/L      CO2 26 mEQ/L      BUN 27 mg/dL      Creatinine 1.5 mg/dL      Glucose 150 mg/dL      Calcium 9.0 mg/dL      AST (SGOT) 42 IU/L      ALT (SGPT) 8 IU/L      Alkaline Phosphatase 110 IU/L      Total Protein 8.5 g/dL      Comment: Test performed on plasma which typically contains approximately 0.4 g/dL more protein than serum.        Albumin 3.4 g/dL      Bilirubin, Total 1.0 mg/dL      eGFR 43.8 mL/min/1.73m*2      Anion Gap 5 mEQ/L     HS Troponin I (with 2 hour  reflex) [[id number]]  (Abnormal) Collected: 11/29/23 1459    Specimen: Blood, Venous Updated: 11/29/23 1554     High Sens Troponin I 23.4 pg/mL     CBC and differential [[id number]]  (Abnormal) Collected: 11/29/23 1459    Specimen: Blood, Venous Updated: 11/29/23 1525     WBC 5.33 K/uL      RBC 3.59 M/uL      Hemoglobin 11.4 g/dL      Hematocrit 37.4 %      .2 fL      MCH 31.8 pg      MCHC 30.5 g/dL      RDW 17.4 %      Platelets 122 K/uL      MPV 11.2 fL      Differential Type Auto     nRBC 0.0 %      Immature Granulocytes 0.2 %      Neutrophils 69.8 %      Lymphocytes 14.1 %      Monocytes 12.2 %      Eosinophils 2.8 %      Basophils 0.9 %      Immature Granulocytes, Absolute 0.01 K/uL      Neutrophils, Absolute 3.72 K/uL      Lymphocytes, Absolute 0.75 K/uL      Monocytes, Absolute 0.65 K/uL      Eosinophils, Absolute 0.15 K/uL      Basophils, Absolute 0.05 K/uL           Imaging Results          X-RAY CHEST 2 VIEWS (Final result)  Result time 11/29/23 16:23:47    Final result                 Impression:    IMPRESSION:    Cardiomegaly and pulmonary edema. Large left pleural effusion             Narrative:    CLINICAL HISTORY: chest pain    COMPARISON: Prior chest x-ray from 8/11/2023.    COMMENT: 2 radiographic views of the chest.    Pulmonary edema  Large left pleural effusion. There is no pneumothorax  The heart is enlarged.  Mediastinal contours are within normal limits for age.  No acute osseous abnormality.                                ECG 12 lead      EKG 12 lead   Independent Interpretation by ED Provider   Afib 74, RBBB, nonspecific St/T      ECG 12 lead    (Results Pending)       Scoring tools                                  ED Course & MDM   MDM / ED COURSE / CLINICAL IMPRESSION / DISPO     Medical Decision Making  89-year-old male who presents with shortness of breath chest pain differential includes recurrent pleural effusion, CHF, ACS, pneumonia, viral illness    Acute congestive heart  failure, unspecified heart failure type (CMS/HCC): acute illness or injury  Chest pain, unspecified type: acute illness or injury  Pleural effusion on left: acute illness or injury  Amount and/or Complexity of Data Reviewed  Independent Historian: caregiver  External Data Reviewed: notes.     Details: Admit 8/3/2023  Labs: ordered.  Radiology: ordered and independent interpretation performed.     Details: Large left pleural effusion  ECG/medicine tests: ordered and independent interpretation performed.  Discussion of management or test interpretation with external provider(s): Discussed with hospitalist who accepts patient reviewed results with patient and his family.    Risk  Prescription drug management.  Decision regarding hospitalization.             Clinical Impression      Pleural effusion on left   Chest pain, unspecified type   Acute congestive heart failure, unspecified heart failure type (CMS/HCC)     _________________     ED Disposition   Admit / Observation                   Nesha Myles MD  12/02/23 0334

## 2023-11-29 NOTE — ASSESSMENT & PLAN NOTE
-Repeat TFT with TSH 0.02 and T4 1.89  -Home dose Levothyroxine 137mcg  -Will decrease to 112mcg  -Will need outpatient follow-up for repeat labs in 4 to 6 weeks

## 2023-11-29 NOTE — ASSESSMENT & PLAN NOTE
Follows with Dr. Jeffrey cardiology OV 9/5/23  - Not on anticoagulation due to Hx of rectal bleeding, followed by GI at [facility]  - He considered NISH closure device and has decided to not pursue  -Rate controlled A-fib on admission, asymptomatic  -Continue home metoprolol succinate 25 mg, consistent fill history

## 2023-11-29 NOTE — ASSESSMENT & PLAN NOTE
-CXR showed cardiomegaly and pulmonary edema. Large left pleural effusion.  -Notably not hypoxic in the ED, saturating 96% on room air. Symptomatic with cough    -Exudative by Light's criteria on prior admission in August. Repeat fluid ounces this admission again with exudative fluid  -Underwent thoracentesis x 2 (1.2 L, 900 cc), -  pleural fluid studies noted:    Pleural protein/serum protein 3.9/7.5 = 0.52; serum LDH not performed  -Likely in the setting of CHF exacerbation  -Continue IV diuresis  CXR 12/06/23 without worsening/acute concerns

## 2023-11-30 ENCOUNTER — APPOINTMENT (INPATIENT)
Dept: RADIOLOGY | Facility: HOSPITAL | Age: 88
DRG: 291 | End: 2023-11-30
Attending: PHYSICIAN ASSISTANT
Payer: MEDICARE

## 2023-11-30 ENCOUNTER — APPOINTMENT (INPATIENT)
Dept: RADIOLOGY | Facility: HOSPITAL | Age: 88
DRG: 291 | End: 2023-11-30
Payer: MEDICARE

## 2023-11-30 LAB
ANION GAP SERPL CALC-SCNC: 10 MEQ/L (ref 3–15)
APPEARANCE FLD: ABNORMAL
BASOPHILS # BLD: 0.04 K/UL (ref 0.01–0.1)
BASOPHILS NFR BLD: 0.7 %
BODY FLD TYPE: ABNORMAL
BUN SERPL-MCNC: 28 MG/DL (ref 7–25)
CALCIUM SERPL-MCNC: 8.5 MG/DL (ref 8.6–10.3)
CHLORIDE SERPL-SCNC: 109 MEQ/L (ref 98–107)
CO2 SERPL-SCNC: 24 MEQ/L (ref 21–31)
COLOR FLD: YELLOW
CREAT SERPL-MCNC: 1.4 MG/DL (ref 0.7–1.3)
DIFFERENTIAL METHOD BLD: ABNORMAL
EOSINOPHIL # BLD: 0.15 K/UL (ref 0.04–0.54)
EOSINOPHIL NFR BLD: 2.7 %
ERYTHROCYTE [DISTWIDTH] IN BLOOD BY AUTOMATED COUNT: 17.3 % (ref 11.6–14.4)
GFR SERPL CREATININE-BSD FRML MDRD: 47.4 ML/MIN/1.73M*2
GLUCOSE FLD-MCNC: 109 MG/DL
GLUCOSE SERPL-MCNC: 120 MG/DL (ref 70–99)
HCT VFR BLDCO AUTO: 32.9 % (ref 40.1–51)
HGB BLD-MCNC: 10.2 G/DL (ref 13.7–17.5)
IMM GRANULOCYTES # BLD AUTO: 0.01 K/UL (ref 0–0.08)
IMM GRANULOCYTES NFR BLD AUTO: 0.2 %
INR PPP: 1.6
INR PPP: 1.9
LDH FLD L TO P-CCNC: 109 U/L
LYMPHOCYTES # BLD: 0.86 K/UL (ref 1.2–3.5)
LYMPHOCYTES NFR BLD: 15.6 %
LYMPHOCYTES NFR FLD MANUAL: 76 %
MAGNESIUM SERPL-MCNC: 1.7 MG/DL (ref 1.8–2.5)
MCH RBC QN AUTO: 31.3 PG (ref 28–33.2)
MCHC RBC AUTO-ENTMCNC: 31 G/DL (ref 32.2–36.5)
MCV RBC AUTO: 100.9 FL (ref 83–98)
MONOCYTES # BLD: 0.62 K/UL (ref 0.3–1)
MONOCYTES NFR BLD: 11.2 %
MONOS+MACROS NFR FLD MANUAL: 22 %
NEUTROPHILS # BLD: 3.84 K/UL (ref 1.7–7)
NEUTROPHILS NFR FLD MANUAL: 2 %
NEUTS SEG NFR BLD: 69.6 %
NRBC BLD-RTO: 0 %
PDW BLD AUTO: 11.2 FL (ref 9.4–12.4)
PH FLD: 7.5 [PH]
PLATELET # BLD AUTO: 122 K/UL (ref 150–350)
POTASSIUM SERPL-SCNC: 3.8 MEQ/L (ref 3.5–5.1)
PROT FLD-MCNC: 3.9 G/DL
PROTHROMBIN TIME: 18.7 SEC (ref 12.2–14.5)
PROTHROMBIN TIME: 21.5 SEC (ref 12.2–14.5)
RBC # BLD AUTO: 3.26 M/UL (ref 4.5–5.8)
RBC # SNV: ABNORMAL CELLS/CU MM (ref 0–10000)
SODIUM SERPL-SCNC: 143 MEQ/L (ref 136–145)
SPECIMEN SOURCE: ABNORMAL
WBC # BLD AUTO: 5.52 K/UL (ref 3.8–10.5)
WBC # SNV AUTO: 854 CELLS/CU MM (ref 0–200)

## 2023-11-30 PROCEDURE — 89050 BODY FLUID CELL COUNT: CPT

## 2023-11-30 PROCEDURE — 0W9B3ZX DRAINAGE OF LEFT PLEURAL CAVITY, PERCUTANEOUS APPROACH, DIAGNOSTIC: ICD-10-PCS | Performed by: RADIOLOGY

## 2023-11-30 PROCEDURE — 87206 SMEAR FLUORESCENT/ACID STAI: CPT

## 2023-11-30 PROCEDURE — 25000000 HC PHARMACY GENERAL: Performed by: PHYSICIAN ASSISTANT

## 2023-11-30 PROCEDURE — 71045 X-RAY EXAM CHEST 1 VIEW: CPT

## 2023-11-30 PROCEDURE — 83735 ASSAY OF MAGNESIUM: CPT

## 2023-11-30 PROCEDURE — 85610 PROTHROMBIN TIME: CPT | Performed by: INTERNAL MEDICINE

## 2023-11-30 PROCEDURE — 63700000 HC SELF-ADMINISTRABLE DRUG

## 2023-11-30 PROCEDURE — 36100330 IR THORACENTESIS

## 2023-11-30 PROCEDURE — 21400000 HC ROOM AND CARE CCU/INTERMEDIATE

## 2023-11-30 PROCEDURE — 83615 LACTATE (LD) (LDH) ENZYME: CPT

## 2023-11-30 PROCEDURE — 88112 CYTOPATH CELL ENHANCE TECH: CPT

## 2023-11-30 PROCEDURE — 85610 PROTHROMBIN TIME: CPT | Performed by: PHYSICIAN ASSISTANT

## 2023-11-30 PROCEDURE — 25800000 HC PHARMACY IV SOLUTIONS: Performed by: HOSPITALIST

## 2023-11-30 PROCEDURE — 87205 SMEAR GRAM STAIN: CPT

## 2023-11-30 PROCEDURE — C1729 CATH, DRAINAGE: HCPCS

## 2023-11-30 PROCEDURE — 80048 BASIC METABOLIC PNL TOTAL CA: CPT

## 2023-11-30 PROCEDURE — 99233 SBSQ HOSP IP/OBS HIGH 50: CPT | Performed by: INTERNAL MEDICINE

## 2023-11-30 PROCEDURE — 87116 MYCOBACTERIA CULTURE: CPT

## 2023-11-30 PROCEDURE — 85025 COMPLETE CBC W/AUTO DIFF WBC: CPT

## 2023-11-30 PROCEDURE — 83986 ASSAY PH BODY FLUID NOS: CPT

## 2023-11-30 PROCEDURE — 63600000 HC DRUGS/DETAIL CODE: Mod: JZ | Performed by: HOSPITALIST

## 2023-11-30 PROCEDURE — 36415 COLL VENOUS BLD VENIPUNCTURE: CPT

## 2023-11-30 PROCEDURE — 82945 GLUCOSE OTHER FLUID: CPT

## 2023-11-30 PROCEDURE — 87102 FUNGUS ISOLATION CULTURE: CPT

## 2023-11-30 PROCEDURE — 84157 ASSAY OF PROTEIN OTHER: CPT

## 2023-11-30 PROCEDURE — 63600000 HC DRUGS/DETAIL CODE: Mod: JZ

## 2023-11-30 RX ORDER — LIDOCAINE HYDROCHLORIDE 10 MG/ML
INJECTION, SOLUTION INFILTRATION; PERINEURAL
Status: COMPLETED | OUTPATIENT
Start: 2023-11-30 | End: 2023-11-30

## 2023-11-30 RX ORDER — LEVOTHYROXINE SODIUM 112 UG/1
112 TABLET ORAL
Status: DISCONTINUED | OUTPATIENT
Start: 2023-12-01 | End: 2023-12-07 | Stop reason: HOSPADM

## 2023-11-30 RX ADMIN — CEFTRIAXONE SODIUM 1 G: 1 INJECTION, POWDER, FOR SOLUTION INTRAMUSCULAR; INTRAVENOUS at 08:38

## 2023-11-30 RX ADMIN — ESCITALOPRAM OXALATE 5 MG: 5 TABLET, FILM COATED ORAL at 08:38

## 2023-11-30 RX ADMIN — MAGNESIUM OXIDE TAB 400 MG (241.3 MG ELEMENTAL MG) 400 MG: 400 (241.3 MG) TAB at 08:39

## 2023-11-30 RX ADMIN — CLOPIDOGREL 75 MG: 75 TABLET ORAL at 08:39

## 2023-11-30 RX ADMIN — MAGNESIUM OXIDE TAB 400 MG (241.3 MG ELEMENTAL MG) 400 MG: 400 (241.3 MG) TAB at 21:09

## 2023-11-30 RX ADMIN — LIDOCAINE HYDROCHLORIDE 10 ML: 10 INJECTION, SOLUTION INFILTRATION; PERINEURAL at 14:54

## 2023-11-30 RX ADMIN — MULTIPLE VITAMINS W/ MINERALS TAB 1 TABLET: TAB at 08:38

## 2023-11-30 RX ADMIN — THIAMINE HCL TAB 100 MG 100 MG: 100 TAB at 08:39

## 2023-11-30 RX ADMIN — ANTACID TABLETS 500 MG: 500 TABLET, CHEWABLE ORAL at 21:10

## 2023-11-30 RX ADMIN — Medication 3 MG: at 21:09

## 2023-11-30 RX ADMIN — FUROSEMIDE 40 MG: 10 INJECTION, SOLUTION INTRAMUSCULAR; INTRAVENOUS at 17:29

## 2023-11-30 RX ADMIN — CYANOCOBALAMIN TAB 1000 MCG 500 MCG: 1000 TAB at 08:40

## 2023-11-30 RX ADMIN — FUROSEMIDE 40 MG: 10 INJECTION, SOLUTION INTRAMUSCULAR; INTRAVENOUS at 08:00

## 2023-11-30 RX ADMIN — PANTOPRAZOLE SODIUM 20 MG: 20 TABLET, DELAYED RELEASE ORAL at 08:38

## 2023-11-30 RX ADMIN — Medication 500 MG: at 08:38

## 2023-11-30 ASSESSMENT — COGNITIVE AND FUNCTIONAL STATUS - GENERAL
CLIMB 3 TO 5 STEPS WITH RAILING: 1 - TOTAL
STANDING UP FROM CHAIR USING ARMS: 2 - A LOT
MOVING TO AND FROM BED TO CHAIR: 2 - A LOT
WALKING IN HOSPITAL ROOM: 1 - TOTAL

## 2023-11-30 NOTE — ASSESSMENT & PLAN NOTE
Suspected volume overload> aspiration,   No s/s PNA (also checked CXR repeat over the weekend),  SLP input was to consult GI,     12/04/23 this morning he had a long coughing and emesis spell following taking 2 pills, after some time he was able to tolerate a small spoonful of applesauce.     Appreciate GI input. Suspect stool burden. KUB shows stool burden without obstruction, completed 12/05/23,   pepcid, miralax, zofran prn (all ordered except tigan for latter given current qtc >500).   SLP consult in place  Improvement on KUB 12/06/23

## 2023-11-30 NOTE — ASSESSMENT & PLAN NOTE
Follows with Dr. Jeffrey cardiology  -CABG in 1996; adenosine MPI negative for ischemia in 6/13  -Maintained on Plavix and torsemide 10 mg daily with as needed doses available for weight gain  -TTE (1/2023): 40-45%; ASM; severely dilated RV; severe TR; large left pleural effusion; RVSP calculated at 31 mmHg which is likely underestimated; IVC is very dilated at 5 cm  -Admit with CHF order set  -Home diuretic torsemide 10 mg, reduced from previous dose of 20 mg  -Hold home diuretic and continue with 40 mg IV Lasix twice daily  -Monitor for hypotension and pull back from IV diuresis as needed  -Cards consulted, follow recommendations, switch to PO tomorrow (PTA torsemide but higher dose, 20 instead of 10)  DC home , to be picked up 3pm 12/7 by one of his sons.

## 2023-11-30 NOTE — ASSESSMENT & PLAN NOTE
Diagnosed outpatient, on ciprofloxacin 500 mg twice daily prescribed on 11/28 for 7-day course  -Will order ceftriaxone on admission  to complete course, last day 12/5.

## 2023-11-30 NOTE — PROGRESS NOTES
Patient: Samy Elena  Location: [facility] ED41  MRN:  [id number]  Today's date:  11/30/2023    Attempted to see patient for therapy. Unable due to medical hold. Per nursing, patient NPO for procedure. SLP to follow when patient is cleared for oral intake and as SLP schedule allows.

## 2023-11-30 NOTE — H&P
Hospital Medicine Service -  History & Physical        CHIEF COMPLAINT   Cough worsening x2 days      HISTORY OF PRESENT ILLNESS      Samy Elena is a 89 y.o. 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     He is accompanied by his son and his caregiver at the bedside who aided in history taking as the patient is not an excellent medical historian.  The patient reports he has a somewhat chronic cough that acutely worsened over the course the last 2 days.  He reports he has recurrent coughing spells that can last minutes on and with production of thick white mucus.  Denies any blood tinge to the mucus.  Denies any nausea or vomiting.  Denies dizziness or lightheadedness, chest pain, heart palpitations, fevers, chills, or abdominal pain.  Cough present dependent on mealtimes but also does not current mealtimes.  With regard to history of dysphagia, the family reports has had swallowing studies done in the past and had previously been on modified diet but does not augment at this time.  The patient denies any globus sensation.    With regard to shortness of breath, the shortness of breath is generally brought on by these coughing spells although he does have some shortness of breath on exertion.  Unclear if this SOLIS is worse than usual.  It seems he is working on endurance with PT in the outpatient setting and is working going up stairs which the patient seems excited about.  Denies any recent falls.  Denies any leg swelling.  Per the patient and caregivers at the bedside, there is not been any change in diet or fluid intake of late.  His home torsemide  dose was changed from 20 mg to 10 mg a few months ago but they report compliance with this medication.  Weight has been stable.     Dry weight: 164 pounds, no recent change in weight.     Pt's cardiologist is Dr. 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)    • Coronary artery disease    • Disease of thyroid gland    • Will catheter in place    • GI (gastrointestinal bleed)    • Hypertension    • Myocardial infarction (CMS/HCC)    • Orthostatic hypotension    • TIA (transient ischemic attack)        Past Surgical History:   Procedure Laterality Date   • CHOLECYSTECTOMY     • CORONARY ARTERY BYPASS GRAFT     • JOINT REPLACEMENT Left     Knee   • THYROIDECTOMY         PCP: Zachery Hernandez MD    MEDICATIONS      Prior to Admission medications    Medication Sig Start Date End Date Taking? Authorizing Provider   ascorbic acid (VITAMIN C) 500 mg tablet Take 500 mg by mouth every morning.   Yes Provider, Clay Lopez MD   calcium carbonate (TUMS) 200 mg calcium (500 mg) chewable tablet Take 1 tablet by mouth 2 (two) times a day.   Yes ProviderClay MD   ciprofloxacin (CIPRO) 500 mg tablet Take 500 mg by mouth 2 (two) times a day.   Yes Provider, Clay Lopez MD   clopidogreL (PLAVIX) 75 mg tablet Take 75 mg by mouth every morning.   Yes ProviderClay MD   cyanocobalamin (VITAMIN B12) 500 mcg tablet Take 500 mcg by mouth every morning.   Yes ProviderClay MD   escitalopram (LEXAPRO) 5 mg tablet Take 5 mg by mouth every morning.   Yes ProviderClay MD   levothyroxine (SYNTHROID) 137 mcg tablet Take 137 mcg by  mouth every morning.   Yes ProviderClay MD   magnesium oxide (MAG-OX) 400 mg (241.3 mg magnesium) tablet Take 400 mg by mouth 2 (two) times a day.   Yes Clay Chambers MD   multivitamin tablet Take 1 tablet by mouth every morning.   Yes Clay Chambers MD   omeprazole OTC (PriLOSEC OTC) 20 mg EC tablet Take 20 mg by mouth daily before breakfast.   Yes Clay Chambers MD   potassium chloride (KLOR-CON) 10 mEq CR tablet Take 20 mEq by mouth every morning.   Yes Clay Chambers MD   thiamine 100 mg tablet Take 100 mg by mouth every morning.   Yes Clay Chambers MD   torsemide (DEMADEX) 10 mg tablet Take 1 tablet (10 mg total) by mouth daily.  Patient taking differently: Take 10 mg by mouth every morning. 8/10/23 11/29/23 Yes Lulu Browning MD   melatonin 3 mg tablet Take 3 mg by mouth nightly.    ProviderClay MD   amLODIPine (NORVASC) 2.5 mg tablet amlodipine 2.5 mg tablet  7/22/22  John Chambers MD   apixaban (ELIQUIS) 2.5 mg tablet   7/22/22  John Chambers MD   hydrochlorothiazide (MICROZIDE) 12.5 mg capsule hydrochlorothiazide 12.5 mg capsule  7/22/22  John Chambers MD   omeprazole (PriLOSEC) 20 mg capsule omeprazole 20 mg capsule,delayed release   TAKE 1 CAPSULE BY MOUTH EVERY DAY IN THE MORNING  7/22/22  John Chambers MD       ALLERGIES      Jardiance [empagliflozin]    FAMILY HISTORY      No family history on file.    SOCIAL HISTORY      Social History     Socioeconomic History   • Marital status:    Tobacco Use   • Smoking status: Never   • Smokeless tobacco: Never   Vaping Use   • Vaping Use: Never used   Substance and Sexual Activity   • Alcohol use: Yes     Comment: social   • Drug use: Never     Social Determinants of Health     Financial Resource Strain: Low Risk  (8/4/2023)    Overall Financial Resource Strain (CARDIA)    • Difficulty of Paying Living Expenses: Not hard at all   Food  Insecurity: No Food Insecurity (8/3/2023)    Hunger Vital Sign    • Worried About Running Out of Food in the Last Year: Never true    • Ran Out of Food in the Last Year: Never true   Transportation Needs: No Transportation Needs (8/4/2023)    PRAPARE - Transportation    • Lack of Transportation (Medical): No    • Lack of Transportation (Non-Medical): No   Housing Stability: Low Risk  (8/4/2023)    Housing Stability Vital Sign    • Unable to Pay for Housing in the Last Year: No    • Number of Places Lived in the Last Year: 1    • Unstable Housing in the Last Year: No       REVIEW OF SYSTEMS      All other systems reviewed and negative except as noted in HPI      PHYSICAL EXAMINATION      Temp:  [36.4 °C (97.5 °F)] 36.4 °C (97.5 °F)  Heart Rate:  [79-82] 82  Resp:  [20-24] 24  BP: (115-140)/(65-69) 140/69  There is no height or weight on file to calculate BMI.    Physical Exam  Vitals and nursing note reviewed.   Neck:      Comments: Prominent EJ vein to the angle of the mandible   Cardiovascular:      Rate and Rhythm: Normal rate. Rhythm irregular.      Pulses: Normal pulses.      Heart sounds: No murmur heard.     No gallop.   Pulmonary:      Effort: Pulmonary effort is normal.      Breath sounds: No wheezing.      Comments: Decreased breath sounds on the left, dullness to percussion on the left   Abdominal:      General: Bowel sounds are normal. There is no distension.      Palpations: Abdomen is soft.      Tenderness: There is no abdominal tenderness.      Comments: Ostomy present, brown soft output   Musculoskeletal:      Right lower leg: No edema.      Left lower leg: No edema.   Skin:     General: Skin is warm and dry.      Capillary Refill: Capillary refill takes less than 2 seconds.   Neurological:      Mental Status: He is alert and oriented to person, place, and time.   Psychiatric:         Mood and Affect: Mood normal.         LABS / IMAGING / EKG        Labs    ,llcbc3  Results from last 7 days   Lab  Units 11/29/23  1459   SODIUM mEQ/L 143   POTASSIUM mEQ/L 4.3   CHLORIDE mEQ/L 112*   CO2 mEQ/L 26   BUN mg/dL 27*   CREATININE mg/dL 1.5*   CALCIUM mg/dL 9.0   ALBUMIN g/dL 3.4*   BILIRUBIN TOTAL mg/dL 1.0   ALK PHOS IU/L 110   ALT IU/L 8   AST IU/L 42*   GLUCOSE mg/dL 150*       Imaging  X-RAY CHEST 2 VIEWS    Result Date: 11/29/2023  Narrative: CLINICAL HISTORY: chest pain COMPARISON: Prior chest x-ray from 8/11/2023. COMMENT: 2 radiographic views of the chest. Pulmonary edema Large left pleural effusion. There is no pneumothorax The heart is enlarged. Mediastinal contours are within normal limits for age. No acute osseous abnormality.     Impression: IMPRESSION: Cardiomegaly and pulmonary edema. Large left pleural effusion        ECG/Telemetry  Rate controlled a.fib     ASSESSMENT AND PLAN           * Pleural effusion on left  Assessment & Plan  CXR showed cardiomegaly and pulmonary edema.  Large left pleural effusion.  -Notably not hypoxic in the ED, saturating 96% on room air. Symptomatic with cough    -Exudative by Light's criteria August admission  - NPO MN for IR rob 11/30, diagnostic and therapeutic       Cough  Assessment & Plan  Differential is broad and multifactorial but is likely due to volume overload/pleural effusion in the absence of systemic symptoms to suggest pneumonia, aspiration is on the differential as well however the cough is present independent of mealtimes (in addition to mealtimes)  -SLP consult  -As needed guaifenesin to start, consider benzonatate if needed  -Duration is greater than 1 week but acutely worsened over the last 48 hours, no indication for antibiotics presenting with acute bacterial bronchitis or pneumonia.  Patient is on antibiotics for UTI as per separate problem    Atrial fibrillation (CMS/[facility])  Assessment & Plan  Follows with Dr. Jeffrey cardiology OV 9/5/23  - Not on anticoagulation due to Hx of rectal bleeding, followed by GI at [facility]  - He considered NISH closure  device and has decided to not pursue  -Rate controlled A-fib on admission, asymptomatic  -Continue home metoprolol succinate 25 mg, consistent fill history    Ischemic congestive cardiomyopathy (CMS/HCC)  Assessment & Plan  Follows with Dr. Jeffrey cardiology  -CABG in ; adenosine MPI negative for ischemia in   -Maintained on Plavix and torsemide 10 mg daily with as needed doses available for weight gain  -TTE (2023): 40-45%; ASM; severely dilated RV; severe TR; large left pleural effusion; RVSP calculated at 31 mmHg which is likely underestimated; IVC is very dilated at 5 cm  -Admit with CHF order set  -Home diuretic torsemide 10 mg, reduced from previous dose of 20 mg  -Hold home diuretic and continue with 40 mg IV Lasix twice daily  -Monitor for hypotension and pull back from IV diuresis as needed  -IR Thora as above    UTI (urinary tract infection)  Assessment & Plan  Diagnosed outpatient, on ciprofloxacin 500 mg twice daily prescribed on  for 7-day course  -Will order on admission and plan for 5-day course the interest of stewardship    Depression  Assessment & Plan  Continue home escitalopram and mirtazapine    Hypothyroidism  Assessment & Plan  Continue home levothyroxine every morning, 137 mcg  TSH 4 months ago 0.09, will recheck and could consider going back on the dose and rechecking in 4 weeks versus deferring this to the outpatient team  -Follow-up TSH    Hyperlipidemia  Assessment & Plan  Previously had been on atorvastatin 40 mg, last dispense for 30 tablets in 2022  -Hold on admission seeing likely not taking  -Check lipid panel for completeness in the morning       VTE Assessment: Padua    VTE Prophylaxis: Current anticoagulants:    •None      SCDs seeing hx of GIB    Code Status: DNR (A.N.D.)        Estimated Discharge Date: 2023  Disposition Plannin-3 days pending course, PT/OT and SLP c/s      SOPHIA Miranda  2023       NOTE:   Some or all of the note  above was created with the use of dictation software, please note this dictation software can have abnormalities in its ability to transcribe. Please contact me directly for anything that seems abnormal, for clarification.

## 2023-11-30 NOTE — POST-PROCEDURE NOTE
Interventional Radiology Brief Postprocedure Note    Samy Elena     Attending: SOPHIA Donohue / Constantino Potts MD     Assistant: Austin    Diagnosis: cough, sob    Description of procedure: US guided L thora    Contrast: none     Anesthesia:  Local (Lidocaine)    Volume of Lidocaine Utilized (ml): 10     Medications: none     Complications: None      Estimated Blood Loss: Estimated Blood Loss: 0-10 ml    Anticoagulation: n/a    Specimens: 1.2L clear yellow pleural fluid    Findings: Successful US guided L thora with 1.2L pleural fluid removed and sent to lab. Pt luisa well. BP dropped to 80/50s at the end of procedure and improved in several minutes. Pt asymptomatic throughout. Large pleural effusion remains on bedside US. CXR pending. Team updated.     11/30/2023 3:09 PM

## 2023-11-30 NOTE — PROGRESS NOTES
Hospital Medicine Service -  Daily Progress Note       SUBJECTIVE   Interval History: No acute events overnight. Patient seen and examined at bedside. Reports ongoing productive cough with thick sputum. Denies any shortness of breath at rest but becomes dyspneic with exertion.  He also has been nauseous this morning with episodes of vomiting when he starts coughing.     OBJECTIVE      Vital signs in last 24 hours:  Heart Rate:  [64-82] 70  Resp:  [16-24] 16  BP: ()/(44-74) 96/53    Intake/Output Summary (Last 24 hours) at 11/30/2023 1535  Last data filed at 11/30/2023 1503  Gross per 24 hour   Intake 100 ml   Output 2800 ml   Net -2700 ml     Weights (last 7 days)     Date/Time Weight    11/29/23 23:03:36 74.4 kg (164 lb)          PHYSICAL EXAMINATION      Physical Exam  General: Awake, alert. No acute distress. Elderly   HEENT: PERRL/EOMI; Sclerae anicteric. Moist mucous membranes.   Neck: Supple.No bruits. No adenopathy. + JVD  Lungs: Decreased breath sounds on left side, bilateral lower base crackles  Cardiovascular: Regular rate and rhythm. + systolic murmur   Abdomen: Soft, non tender, non distended. + Ostomy bag with brown stool  Extremities: No edema bilaterally.  Neuro: No focal deficits  Psych: AAOx3; full range affect         LINES, CATHETERS, DRAINS, AIRWAYS, AND WOUNDS   Lines, Drains, and Airways:  Wounds (agree with documentation and present on admission):         Comments:      LABS / IMAGING / TELE      Labs  Results from last 7 days   Lab Units 11/30/23  0544   SODIUM mEQ/L 143   POTASSIUM mEQ/L 3.8   CHLORIDE mEQ/L 109*   CO2 mEQ/L 24   CREATININE mg/dL 1.4*   BUN mg/dL 28*     Results from last 7 days   Lab Units 11/30/23  0544   WBC K/uL 5.52   HEMOGLOBIN g/dL 10.2*   HEMATOCRIT % 32.9*   PLATELETS K/uL 122*       Imaging  No results found for this or any previous visit.    IR THORACENTESIS  Narrative: CLINICAL HISTORY: shortness of breath.    COMMENT: The patient was referred for  ultrasound-guided thoracentesis on the  left side.  Informed consent was obtained.  With the patient sitting, ultrasound  imaging was performed and a large size pleural effusion was identified.  The  fluid was localized and a site was selected on the skin with ultrasound.  The  site was marked.  Using sterile technique, under local anesthesia, a 4-French  valved Yueh catheter was inserted into the pleural space. 1.2 L of yellow  pleural fluid were removed.  The catheter was removed and a sterile dressing was  placed over the catheter insertion site. A large pleural effusion remained post  procedure.  This procedure was discontinued prior to complete evacuation of the  effusion because the daily maximum was reached and the patient's blood pressure  drop to 80s over 50s.  The fluid was sent to the lab.  The patient tolerated the  procedure well.    An erect frontal chest film was obtained immediately following the procedure and  will be separately reported.    This service rendered by Avril Nava PA-C  Impression: IMPRESSION: Successful ultrasound-guided left-sided thoracentesis with 1.2 L  pleural fluid removed and sent to the lab.  All components of the time-out,  debriefing, and handling of the specimen were conducted as per the ASAP Specimen  Protocol.    I certify that I have personally reviewed this examination and agree with Avril Nava's report.  Constantino Potts MD      ECG/Telemetry  Reviewed     ASSESSMENT AND PLAN      Acute on chronic systolic (congestive) heart failure (CMS/HCC)  Assessment & Plan  Follows with Dr. Jeffrey cardiology  -CABG in 1996; adenosine MPI negative for ischemia in 6/13  -Maintained on Plavix and torsemide 10 mg daily with as needed doses available for weight gain  -TTE (1/2023): 40-45%; ASM; severely dilated RV; severe TR; large left pleural effusion; RVSP calculated at 31 mmHg which is likely underestimated; IVC is very dilated at 5 cm  -Admit with CHF order set  -Home diuretic  torsemide 10 mg, reduced from previous dose of 20 mg  -Hold home diuretic and continue with 40 mg IV Lasix twice daily  -Monitor for hypotension and pull back from IV diuresis as needed  -IR Thora as above      * Pleural effusion on left  Assessment & Plan  -CXR showed cardiomegaly and pulmonary edema.  Large left pleural effusion.  -Notably not hypoxic in the ED, saturating 96% on room air. Symptomatic with cough    -Exudative by Light's criteria on prior admission in August   -Plan for IR thoracentesis today, will send fluid analysis labs       Cough  Assessment & Plan  Differential is broad and multifactorial but is likely due to volume overload/pleural effusion in the absence of systemic symptoms to suggest pneumonia, aspiration is on the differential as well however the cough is present independent of mealtimes (in addition to mealtimes)  -SLP consult  -As needed guaifenesin to start, consider benzonatate if needed  -Duration is greater than 1 week but acutely worsened over the last 48 hours, no indication for antibiotics presenting with acute bacterial bronchitis or pneumonia.  Patient is on antibiotics for UTI as per separate problem    Hypothyroidism  Assessment & Plan  -Repeat TFT with TSH 0.02 and T4 1.89  -Home dose Levothyroxine 137mcg  -Will decrease to 112mcg  -Will need outpatient follow-up for repeat labs in 4 to 6 weeks    Atrial fibrillation (CMS/HCC)  Assessment & Plan  Follows with Dr. Jeffrey cardiology OV 9/5/23  - Not on anticoagulation due to Hx of rectal bleeding, followed by GI at [facility]  - He considered NISH closure device and has decided to not pursue  -Rate controlled A-fib on admission, asymptomatic  -Continue home metoprolol succinate 25 mg, consistent fill history    Ischemic congestive cardiomyopathy (CMS/HCC)  Assessment & Plan  Follows with Dr. Jeffrey cardiology  -CABG in 1996; adenosine MPI negative for ischemia in 6/13  -Maintained on Plavix and torsemide 10 mg daily with as  needed doses available for weight gain  -TTE (1/2023): 40-45%; ASM; severely dilated RV; severe TR; large left pleural effusion; RVSP calculated at 31 mmHg which is likely underestimated; IVC is very dilated at 5 cm      UTI (urinary tract infection)  Assessment & Plan  Diagnosed outpatient, on ciprofloxacin 500 mg twice daily prescribed on 11/28 for 7-day course  -Will order on admission and plan to complete course     Depression  Assessment & Plan  Continue home escitalopram and mirtazapine    Hyperlipidemia  Assessment & Plan  Previously had been on atorvastatin 40 mg, last dispense for 30 tablets in 12/2022  -Hold on admission seeing likely not taking  -Check lipid panel for completeness in the morning       VTE Assessment: Padua    VTE Prophylaxis:  Current anticoagulants:    •None    SCDs  Code Status: DNR (A.N.D.)      Estimated Discharge Date: 12/2/2023     Disposition Planning: Pending improved clinical status, likely within 48-72 hours     Saul Darling MD  11/30/2023

## 2023-11-30 NOTE — OR SURGEON
Pre-Procedure patient identification:  I am the primary operating surgeon/proceduralist and I have reviewed the applicable pathology reports and radiology studies for this procedure. I have identified the patient and confirmed laterality is left on 11/30/23 at 2:36 PM SOPHIA Donohue C

## 2023-11-30 NOTE — PROGRESS NOTES
Spoke with patient's son and caregiver and confirmed with medication list from [facility] and/or patient's own pharmacy to complete the medication reconciliation.     Prior to admission medication list:    Current Outpatient Medications:   •  ascorbic acid (VITAMIN C) 500 mg tablet, Take 500 mg by mouth every morning.  •  bimatoprost (LUMIGAN) 0.01 % ophthalmic drops, Administer 1 drop into the left eye nightly.  •  calcium carbonate (TUMS) 200 mg calcium (500 mg) chewable tablet, Take 1 tablet by mouth 2 (two) times a day.  •  ciprofloxacin (CIPRO) 250 mg tablet, Take 250 mg by mouth 2 (two) times a day.  •  clopidogreL (PLAVIX) 75 mg tablet, Take 75 mg by mouth every morning.  •  cyanocobalamin (VITAMIN B12) 500 mcg tablet, Take 500 mcg by mouth every morning.  •  escitalopram (LEXAPRO) 5 mg tablet, Take 5 mg by mouth every morning.  •  levothyroxine (SYNTHROID) 137 mcg tablet, Take 137 mcg by mouth every morning.  •  loteprednol etabonate (LOTEMAX) 0.5 % ointment, Apply 1 Application to left eye 3 (three) times a day.  •  magnesium oxide (MAG-OX) 400 mg (241.3 mg magnesium) tablet, Take 400 mg by mouth 2 (two) times a day.  •  melatonin 3 mg tablet, Take 3 mg by mouth nightly.  •  mirtazapine (REMERON) 15 mg tablet, Take 15 mg by mouth nightly. Every other night  •  multivitamin tablet, Take 1 tablet by mouth every morning.  •  omeprazole OTC (PriLOSEC OTC) 20 mg EC tablet, Take 20 mg by mouth daily before breakfast.  •  potassium chloride (KLOR-CON) 10 mEq CR tablet, Take 20 mEq by mouth 2 (two) times a day.  •  thiamine 50 mg tablet, Take 50 mg by mouth daily.  •  tobramycin (TOBREX) 0.3 % ophthalmic solution, Administer 1 drop into the left eye daily.  •  torsemide (DEMADEX) 10 mg tablet, Take 10 mg by mouth daily.    Comments about home medications:  -Patient finished Augmentin 875/125 on 11/28/23.  -Patient is not taking Entresto.  -Patient is not taking metoprolol succinate  25mg.    Compliance:  -Consistent fills, no compliance concerns based on patient interview

## 2023-12-01 ENCOUNTER — APPOINTMENT (INPATIENT)
Dept: RADIOLOGY | Facility: HOSPITAL | Age: 88
DRG: 291 | End: 2023-12-01
Attending: INTERNAL MEDICINE
Payer: MEDICARE

## 2023-12-01 ENCOUNTER — APPOINTMENT (INPATIENT)
Dept: RADIOLOGY | Facility: HOSPITAL | Age: 88
DRG: 291 | End: 2023-12-01
Attending: PHYSICIAN ASSISTANT
Payer: MEDICARE

## 2023-12-01 LAB
ANION GAP SERPL CALC-SCNC: 9 MEQ/L (ref 3–15)
ATRIAL RATE: 87
BASOPHILS # BLD: 0.04 K/UL (ref 0.01–0.1)
BASOPHILS NFR BLD: 0.7 %
BUN SERPL-MCNC: 30 MG/DL (ref 7–25)
CALCIUM SERPL-MCNC: 8.5 MG/DL (ref 8.6–10.3)
CHLORIDE SERPL-SCNC: 106 MEQ/L (ref 98–107)
CO2 SERPL-SCNC: 27 MEQ/L (ref 21–31)
CREAT SERPL-MCNC: 1.6 MG/DL (ref 0.7–1.3)
DIFFERENTIAL METHOD BLD: ABNORMAL
EOSINOPHIL # BLD: 0.13 K/UL (ref 0.04–0.54)
EOSINOPHIL NFR BLD: 2.2 %
ERYTHROCYTE [DISTWIDTH] IN BLOOD BY AUTOMATED COUNT: 17.3 % (ref 11.6–14.4)
FUNGUS STAIN: NORMAL
GFR SERPL CREATININE-BSD FRML MDRD: 40.6 ML/MIN/1.73M*2
GLUCOSE BLD-MCNC: 113 MG/DL (ref 70–99)
GLUCOSE SERPL-MCNC: 88 MG/DL (ref 70–99)
HCT VFR BLDCO AUTO: 34.6 % (ref 40.1–51)
HGB BLD-MCNC: 11.1 G/DL (ref 13.7–17.5)
IMM GRANULOCYTES # BLD AUTO: 0.01 K/UL (ref 0–0.08)
IMM GRANULOCYTES NFR BLD AUTO: 0.2 %
LDH SERPL L TO P-CCNC: 147 IU/L (ref 98–271)
LYMPHOCYTES # BLD: 0.83 K/UL (ref 1.2–3.5)
LYMPHOCYTES NFR BLD: 14 %
MAGNESIUM SERPL-MCNC: 1.7 MG/DL (ref 1.8–2.5)
MCH RBC QN AUTO: 32.2 PG (ref 28–33.2)
MCHC RBC AUTO-ENTMCNC: 32.1 G/DL (ref 32.2–36.5)
MCV RBC AUTO: 100.3 FL (ref 83–98)
MONOCYTES # BLD: 0.65 K/UL (ref 0.3–1)
MONOCYTES NFR BLD: 11 %
NEUTROPHILS # BLD: 4.27 K/UL (ref 1.7–7)
NEUTS SEG NFR BLD: 71.9 %
NRBC BLD-RTO: 0 %
PDW BLD AUTO: 11 FL (ref 9.4–12.4)
PLATELET # BLD AUTO: 139 K/UL (ref 150–350)
POCT TEST: ABNORMAL
POTASSIUM SERPL-SCNC: 3.7 MEQ/L (ref 3.5–5.1)
PROT SERPL-MCNC: 7.5 G/DL (ref 6–8.2)
QRS DURATION: 148
QT INTERVAL: 450
QTC CALCULATION(BAZETT): 522
R AXIS: 72
RBC # BLD AUTO: 3.45 M/UL (ref 4.5–5.8)
RHODAMINE-AURAMINE STN SPEC: NORMAL
SODIUM SERPL-SCNC: 142 MEQ/L (ref 136–145)
T WAVE AXIS: -18
VENTRICULAR RATE: 81
WBC # BLD AUTO: 5.93 K/UL (ref 3.8–10.5)

## 2023-12-01 PROCEDURE — 93010 ELECTROCARDIOGRAM REPORT: CPT | Performed by: INTERNAL MEDICINE

## 2023-12-01 PROCEDURE — 25800000 HC PHARMACY IV SOLUTIONS: Performed by: INTERNAL MEDICINE

## 2023-12-01 PROCEDURE — 85025 COMPLETE CBC W/AUTO DIFF WBC: CPT

## 2023-12-01 PROCEDURE — 63700000 HC SELF-ADMINISTRABLE DRUG

## 2023-12-01 PROCEDURE — 21400000 HC ROOM AND CARE CCU/INTERMEDIATE

## 2023-12-01 PROCEDURE — 36100330 IR THORACENTESIS

## 2023-12-01 PROCEDURE — 84155 ASSAY OF PROTEIN SERUM: CPT | Performed by: INTERNAL MEDICINE

## 2023-12-01 PROCEDURE — 63700000 HC SELF-ADMINISTRABLE DRUG: Performed by: INTERNAL MEDICINE

## 2023-12-01 PROCEDURE — 63600000 HC DRUGS/DETAIL CODE: Mod: JZ | Performed by: INTERNAL MEDICINE

## 2023-12-01 PROCEDURE — 0W9B3ZZ DRAINAGE OF LEFT PLEURAL CAVITY, PERCUTANEOUS APPROACH: ICD-10-PCS | Performed by: RADIOLOGY

## 2023-12-01 PROCEDURE — 83615 LACTATE (LD) (LDH) ENZYME: CPT | Performed by: INTERNAL MEDICINE

## 2023-12-01 PROCEDURE — 92610 EVALUATE SWALLOWING FUNCTION: CPT | Mod: GN

## 2023-12-01 PROCEDURE — C1729 CATH, DRAINAGE: HCPCS

## 2023-12-01 PROCEDURE — 99222 1ST HOSP IP/OBS MODERATE 55: CPT | Performed by: INTERNAL MEDICINE

## 2023-12-01 PROCEDURE — 97166 OT EVAL MOD COMPLEX 45 MIN: CPT | Mod: GO

## 2023-12-01 PROCEDURE — 83735 ASSAY OF MAGNESIUM: CPT

## 2023-12-01 PROCEDURE — 93005 ELECTROCARDIOGRAM TRACING: CPT | Performed by: INTERNAL MEDICINE

## 2023-12-01 PROCEDURE — 99233 SBSQ HOSP IP/OBS HIGH 50: CPT | Performed by: INTERNAL MEDICINE

## 2023-12-01 PROCEDURE — 71045 X-RAY EXAM CHEST 1 VIEW: CPT

## 2023-12-01 PROCEDURE — 36415 COLL VENOUS BLD VENIPUNCTURE: CPT

## 2023-12-01 PROCEDURE — 63600000 HC DRUGS/DETAIL CODE: Mod: JZ

## 2023-12-01 PROCEDURE — 80048 BASIC METABOLIC PNL TOTAL CA: CPT

## 2023-12-01 PROCEDURE — 97162 PT EVAL MOD COMPLEX 30 MIN: CPT | Mod: GP

## 2023-12-01 RX ORDER — POTASSIUM CHLORIDE 14.9 MG/ML
20 INJECTION INTRAVENOUS AS NEEDED
Status: DISCONTINUED | OUTPATIENT
Start: 2023-12-01 | End: 2023-12-02

## 2023-12-01 RX ORDER — POTASSIUM CHLORIDE 750 MG/1
20 TABLET, EXTENDED RELEASE ORAL AS NEEDED
Status: DISCONTINUED | OUTPATIENT
Start: 2023-12-01 | End: 2023-12-02

## 2023-12-01 RX ORDER — POTASSIUM CHLORIDE 750 MG/1
40 TABLET, EXTENDED RELEASE ORAL AS NEEDED
Status: DISCONTINUED | OUTPATIENT
Start: 2023-12-01 | End: 2023-12-02

## 2023-12-01 RX ADMIN — Medication 500 MG: at 12:19

## 2023-12-01 RX ADMIN — LEVOTHYROXINE SODIUM 112 MCG: 0.11 TABLET ORAL at 05:03

## 2023-12-01 RX ADMIN — CEFTRIAXONE SODIUM 1 G: 1 INJECTION, POWDER, FOR SOLUTION INTRAMUSCULAR; INTRAVENOUS at 10:15

## 2023-12-01 RX ADMIN — FUROSEMIDE 40 MG: 10 INJECTION, SOLUTION INTRAMUSCULAR; INTRAVENOUS at 15:25

## 2023-12-01 RX ADMIN — POTASSIUM CHLORIDE 20 MEQ: 750 TABLET, EXTENDED RELEASE ORAL at 22:59

## 2023-12-01 RX ADMIN — MAGNESIUM OXIDE TAB 400 MG (241.3 MG ELEMENTAL MG) 400 MG: 400 (241.3 MG) TAB at 12:19

## 2023-12-01 RX ADMIN — Medication 3 MG: at 22:49

## 2023-12-01 RX ADMIN — MAGNESIUM OXIDE TAB 400 MG (241.3 MG ELEMENTAL MG) 400 MG: 400 (241.3 MG) TAB at 22:49

## 2023-12-01 RX ADMIN — CYANOCOBALAMIN TAB 1000 MCG 500 MCG: 1000 TAB at 12:21

## 2023-12-01 RX ADMIN — PANTOPRAZOLE SODIUM 20 MG: 20 TABLET, DELAYED RELEASE ORAL at 12:20

## 2023-12-01 RX ADMIN — ANTACID TABLETS 500 MG: 500 TABLET, CHEWABLE ORAL at 12:22

## 2023-12-01 RX ADMIN — ANTACID TABLETS 500 MG: 500 TABLET, CHEWABLE ORAL at 22:47

## 2023-12-01 RX ADMIN — CLOPIDOGREL 75 MG: 75 TABLET ORAL at 12:21

## 2023-12-01 RX ADMIN — THIAMINE HCL TAB 100 MG 100 MG: 100 TAB at 12:22

## 2023-12-01 RX ADMIN — ESCITALOPRAM OXALATE 5 MG: 5 TABLET, FILM COATED ORAL at 12:19

## 2023-12-01 ASSESSMENT — COGNITIVE AND FUNCTIONAL STATUS - GENERAL
HELP NEEDED FOR PERSONAL GROOMING: 3 - A LITTLE
STANDING UP FROM CHAIR USING ARMS: 3 - A LITTLE
FOLLOWS FAMILIAR CONVERSATION: 4 - NONE
EATING MEALS: 4 - NONE
CLIMB 3 TO 5 STEPS WITH RAILING: 3 - A LITTLE
TAKING CARE OF COMPLICATED TASKS: 3 - A LITTLE
HELP NEEDED FOR BATHING: 3 - A LITTLE
DRESSING REGULAR UPPER BODY CLOTHING: 3 - A LITTLE
MOVING TO AND FROM BED TO CHAIR: 3 - A LITTLE
CLIMB 3 TO 5 STEPS WITH RAILING: 2 - A LOT
STANDING UP FROM CHAIR USING ARMS: 3 - A LITTLE
REMEMBERING TO TAKE MEDICATION: 3 - A LITTLE
WALKING IN HOSPITAL ROOM: 3 - A LITTLE
WALKING IN HOSPITAL ROOM: 3 - A LITTLE
REMEMBERING 5 ERRANDS WITH NO LIST: 3 - A LITTLE
REMEMBERING WHERE THINGS ARE: 3 - A LITTLE
MOVING TO AND FROM BED TO CHAIR: 3 - A LITTLE
AFFECT: WFL
AFFECT: WFL
TOILETING: 3 - A LITTLE
DRESSING REGULAR LOWER BODY CLOTHING: 3 - A LITTLE
UNDERSTANDING 10 TO 15 MIN SPEECH: 4 - NONE

## 2023-12-01 NOTE — PLAN OF CARE
Care Coordination Discharge Plan Note     Discharge Needs Assessment  Concerns to be Addressed: care coordination/care conferences, discharge planning  Current Discharge Risk: chronically ill    Anticipated Discharge Plan  Anticipated Discharge Disposition: home with home health  Type of Home Care Services: home OT, home PT, nursing        Patient Choice  Offered/Gave Vendor List: yes  Patient's Choice of Community Agency(s): [facility]    Patient and/or patient guardian/advocate was made aware of their right to choose a provider. A list of eligible providers was presented and reviewed with the patient and/or patient guardian/advocate in written and/or verbal form. The list delineates providers in the patient’s desired geographic area who are participating in the Medicare program and/or providers contracted with the patient’s primary insurance. The Medicare list and quality ratings were obtained from the Medicare.gov [medicare.gov] website.    ---------------------------------------------------------------------------------------------------------------------    Interdisciplinary Discharge Plan Review:  Participants:     Concerns Comments: Per DPR, the patient is tentative for discharge over the weekend. [facility] updated. Patient has transportation arranged, son will provide. Spoke to the patient and his son Marko and they are agreeable to the dispo plan. CC will continue to follow for transition of care needs until discharge is complete    Discharge Plan:   Disposition/Destination: Home Health Care - [facility] / Home  Discharge Facility:    Community Resources:      Discharge Transportation:  Is Out of Hospital DNR needed at Discharge:    Does patient need discharge transport? No

## 2023-12-01 NOTE — CONSULTS
Cardiology   Consult Note     ASSESSMENT AND RECOMMENDATIONS     # HFmrEF:  Patient has a history of ischemic heart failure with mildly reduced ejection fraction. Patient is maintained on torsemide 10 mg PO BID at home. He presented with shortness of breath and cough and is being diuresed with lasix IV 40 BID. History of Denise's gangrene with SGLT2i use. Mild JVD elevation on exam, bibasilar crackles and no lower extremity edema. He has a chronic pleural effusion that was tapped yesterday with 1.2 L output. -2.8L balance. , GFR 40, troponin 24.     - Continue diuresis with lasix 40 mg IV BID with close monitoring of kidney function and daily assessment of volume status   - CHF order set: strict I/Os, daily standing weights, cardiac diet, 2g Na, 2L fluids, daily electrolytes with replete for goal K >4.0 and Mg >2.0, continuous telemetry with reassess need daily  - F/U pleural fluid studies   - Mild troponin elevation likely in the setting of demand ischemia due to heart failure exacerbation     # Permanent afib:  History of atrial fibrillation   He is off any anticoagulation due to his GI bleed   Did not want to pursue NISH closure device in the past and rate controlled now     #CAD:  CABG in 1996   Maintained on plavix     Case discussed with primary team. Final recommendations are pending attending co-signature. Please page Cardiology at [phone] with any questions.     Ileana Browne MD  PGY-1    SUBJECTIVE     Admission Date: 11/29/2023   Consult Date: 12/01/23  Reason for Consult: HF exacerbation     HPI  Samy Elena is a 89 y.o. 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 He denies any orthopnea or PND and he reports that he is compliant with his medications.     In the ED, he was given lasix IV 40 once. Chest x-ray showed a chronic moderate size left pleural effusion. He is on room air and on lasix 40 IV BID.     TTE 8/4/23: 45-50%, RV severely dilated and decreased systolic function, dilated RA, dilated IVC   EKG 11/29/23 reviewed: afib, RBBB   Telemetry reviewed: no events     Cardiac history: hmref, afib, CAD  Outpatient Cardiologist: Dr. Jeffrey     ROS: 14-point ROS completed and negative unless otherwise noted above.    OBJECTIVE     PHYSICAL EXAM  Visit Vitals  BP (!) 116/59 (BP Location: Left upper arm, Patient Position: Lying)   Pulse 81   Temp 36.7 °C (98 °F) (Oral)   Resp 19   Ht 1.854 m (6' 1\")   Wt 73.3 kg (161 lb 8 oz)   SpO2 95%   BMI 21.31 kg/m²     Body mass index is 21.31 kg/m².  GEN: NAD, appears stated age, lying comfortably in bed  HEENT: EOMI, MMM, mild JVD  CV: RRR, normal S1/S2, no murmurs  PULM: CTAB, bilateral crackles   GI: SNTND  EXT: no BLE edema  DERM: no visible rashes, bruises, wounds  NEURO: AO3, follows commands appropriately, moves extremities spontaneously  PSYCH: anxious, cooperative      Intake/Output Summary (Last 24 hours) at 12/1/2023 0659  Last data filed at 12/1/2023 0400  24 Hour Net Input/Output from 7AM Yesterday   Intake 100 ml   Output 2950 ml   Net -2850 ml     Weights (last 5 days)     Date/Time Weight    12/01/23 0335 73.3 kg (161 lb 8 oz)    11/29/23 23:03:36 74.4 kg (164 lb)          LABS  Results from last 7 days   Lab Units 11/29/23  1728 11/29/23  1459   HIGH SENSITIVE TROPONIN I pg/mL 24.8* 23.4*   BNP pg/mL  --  467*     Results from last 7 days   Lab Units 12/01/23  0321 11/30/23  0544 11/29/23  1459   SODIUM mEQ/L 142 143 143   POTASSIUM mEQ/L 3.7 3.8 4.3   CHLORIDE mEQ/L 106 109* 112*   CO2 mEQ/L 27 24 26   BUN mg/dL 30* 28* 27*   CREATININE mg/dL 1.6* 1.4* " 1.5*   EGFR mL/min/1.73m*2 40.6* 47.4* 43.8*   ALBUMIN g/dL  --   --  3.4*   BILIRUBIN TOTAL mg/dL  --   --  1.0   ALK PHOS IU/L  --   --  110   ALT IU/L  --   --  8   AST IU/L  --   --  42*   GLUCOSE mg/dL 88 120* 150*     Results from last 7 days   Lab Units 12/01/23  0321 11/30/23  0544 11/29/23  1459   WBC K/uL 5.93 5.52 5.33   HEMOGLOBIN g/dL 11.1* 10.2* 11.4*   HEMATOCRIT % 34.6* 32.9* 37.4*   PLATELETS K/uL 139* 122* 122*     Lab Results   Component Value Date    CHOL 105 08/04/2023    HDL 27 (L) 08/04/2023    LDLCALC 60 08/04/2023    TRIG 89 08/04/2023    TSH 0.02 (L) 11/29/2023    HGBA1C 6.4 (H) 08/03/2023       IMAGING  X-RAY CHEST 1 VIEW    Result Date: 11/30/2023  IMPRESSION: Decreased size of the left-sided pleural effusion/consolidation status post thoracentesis. No definite left pneumothorax. There is still moderate residual left effusion/consolidation.    IR THORACENTESIS    Result Date: 11/30/2023  IMPRESSION: Successful ultrasound-guided left-sided thoracentesis with 1.2 L pleural fluid removed and sent to the lab.  All components of the time-out, debriefing, and handling of the specimen were conducted as per the ASAP Specimen Protocol. I certify that I have personally reviewed this examination and agree with Avril Nava's report. Constantino Potts MD    X-RAY CHEST 2 VIEWS    Result Date: 11/29/2023  IMPRESSION: Cardiomegaly and pulmonary edema. Large left pleural effusion      CARDIAC EXAMS  Most recent Echo results:    TRANSTHORACIC ECHO (TTE) COMPLETE 08/04/2023 (Final) 8/4/2023    Interpretation Summary  Technically difficult study. Patient in atrial fibrillation with rates 75-85 bpm at time of examination.    The left ventricular cavity size is small due to compression from RV with normal wall thickness and mildly reduced systolic function. Estimated EF 45 to 50 %. D shaped left ventricular cavity in the setting of severe RVoverload Unable to assess diastolic function due to atrial  arrhythmia.    The aortic valve is tricuspid. Aortic valve and root sclerosis. Mild aortic regurgitation.    The visualized portion of the aortic root is normal in size.    The mitral valve is thickened. Mild mitral annular calcification. Mild mitral regurgitation.    The left atrium is mildly dilated    The right ventricular cavity is severely dilated with severely decreased systolic function. Nunes's sign is present.    The pulmonic valve is not well seen.    Severely dilated tricuspid valve annulus with tricuspid leaflets displaying a large coaptation gap; approximately 1 cm Severe tricuspid regurgitation with an estimated RVSP of at least 35 mmHg, but likely likely much higher in the setting of severe TR.  Diastolic flow reversal in the hepatic veins.    The right atrium is severely dilated.    The IVC is massively dilated and collapses < 50% with inspiration consistent with severely elevated right atrial pressures.    Large pleural effusion; cannot exclude a pericardial component.  No obvious chamber collapse.  Compared to previous echocardiogram from 8/4/22, no significant changes. Results communicated to primary team.              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)    • Coronary artery disease    • Disease of thyroid gland    • Will catheter in place    • GI (gastrointestinal bleed)    • Hypertension    • Myocardial infarction (CMS/HCC)    • Orthostatic hypotension    • TIA (transient ischemic attack)      Past Surgical History:   Procedure Laterality Date   • CHOLECYSTECTOMY     • CORONARY ARTERY BYPASS GRAFT     • JOINT REPLACEMENT Left     Knee   • THYROIDECTOMY       Social History     Socioeconomic History   • Marital status:    Tobacco Use   • Smoking status: Never   • Smokeless tobacco: Never   Vaping Use   •  Vaping Use: Never used   Substance and Sexual Activity   • Alcohol use: Yes     Comment: social   • Drug use: Never     Social Determinants of Health     Financial Resource Strain: Low Risk  (8/4/2023)    Overall Financial Resource Strain (CARDIA)    • Difficulty of Paying Living Expenses: Not hard at all   Food Insecurity: No Food Insecurity (11/30/2023)    Hunger Vital Sign    • Worried About Running Out of Food in the Last Year: Never true    • Ran Out of Food in the Last Year: Never true   Transportation Needs: No Transportation Needs (8/4/2023)    PRAPARE - Transportation    • Lack of Transportation (Medical): No    • Lack of Transportation (Non-Medical): No   Housing Stability: Low Risk  (8/4/2023)    Housing Stability Vital Sign    • Unable to Pay for Housing in the Last Year: No    • Number of Places Lived in the Last Year: 1    • Unstable Housing in the Last Year: No     No family history on file.    ALLERGIES  Jardiance [empagliflozin]    HOME MEDICATIONS  Current Outpatient Medications   Medication Instructions   • ascorbic acid (VITAMIN C) 500 mg, oral, Every morning   • bimatoprost (LUMIGAN) 0.01 % ophthalmic drops 1 drop, Left Eye, Nightly   • calcium carbonate (TUMS) 200 mg calcium (500 mg) chewable tablet 1 tablet, oral, 2 times daily   • ciprofloxacin (CIPRO) 250 mg, oral, 2 times daily   • clopidogreL (PLAVIX) 75 mg, oral, Every morning   • cyanocobalamin (VITAMIN B12) 500 mcg, oral, Every morning   • escitalopram (LEXAPRO) 5 mg, oral, Every morning   • levothyroxine (SYNTHROID) 137 mcg, oral, Every morning   • loteprednol etabonate (LOTEMAX) 0.5 % ointment 1 Application, Left Eye, 3 times daily   • magnesium oxide (MAG-OX) 400 mg, oral, 2 times daily   • melatonin 3 mg, oral, Nightly   • mirtazapine (REMERON) 15 mg, oral, Nightly, Every other night    • multivitamin tablet 1 tablet, oral, Every morning   • omeprazole OTC (PRILOSEC OTC) 20 mg, oral, Daily before breakfast   • potassium chloride  (KLOR-CON) 10 mEq CR tablet 20 mEq, oral, 2 times daily   • thiamine 50 mg, oral, Daily   • tobramycin (TOBREX) 0.3 % ophthalmic solution 1 drop, Left Eye, Daily   • torsemide (DEMADEX) 10 mg, oral, Daily       INPATIENT MEDICATIONS  Scheduled:  • ascorbic acid  500 mg oral q AM   • calcium carbonate  500 mg oral BID   • cefTRIAXone  1 g intravenous q24h INT   • clopidogreL  75 mg oral q AM   • cyanocobalamin  500 mcg oral q AM   • escitalopram  5 mg oral q AM   • furosemide  40 mg intravenous BID (am, 4p)   • levothyroxine  112 mcg oral Daily (6:30a)   • magnesium oxide  400 mg oral BID   • melatonin  3 mg oral Nightly   • multivitamin  1 tablet oral q AM   • pantoprazole  20 mg oral Daily   • thiamine  100 mg oral q AM     Continuous Infusions:    PRN:

## 2023-12-01 NOTE — OR SURGEON
Pre-Procedure patient identification:  I am the primary operating surgeon/proceduralist and I have reviewed the applicable pathology reports and radiology studies for this procedure. I have identified the patient on 12/01/23 at 1:02 PM SOPHIA Seals C

## 2023-12-01 NOTE — PLAN OF CARE
Care Coordination Admission Assessment Note    General Information:  Readmission Within the last 30 days: no previous admission in last 30 days  Does patient have a : No  Patient-Specific Goals (include timeframe):      Living Arrangements:  Arrived From: home  Current Living Arrangements: home  People in Home:    Home Accessibility: ramp to enter home  Living Arrangement Comments: pt lives in a 2 story home, has a first floor set up, ramp to enter from front door, pt has a 24/7 private pay aide    Housing Stability and Utility Access (SDOH):  In the last 12 months, was there a time when you were not able to pay the mortgage or rent on time?: No  In the last 12 months, how many places have you lived?:    In the last 12 months, was there a time when you did not have a steady place to sleep or slept in a shelter (including now)?: No  In the past 12 months has the electric, gas, oil, or water company threatened to shut off services in your home?: No    Functional Status Prior to Admission:   Assistive Device/Animal Currently Used at Home: wheelchair, scale, walker, standard  Functional Status Comments:    IADL Comments:       Supports and Services:  Current Outpatient/Agency/Support Group:    Type of Current Home Care Services:    History of home care episode or rehab stay:      Discharge Needs Assessment:   Concerns to be Addressed: care coordination/care conferences, discharge planning  Current Discharge Risk:    Anticipated Changes Related to Illness: none    Patient/Family Anticipated Discharge Plan:  Patient/Family Anticipates Transition To: home with help/services  Patient/Family Anticipated Services at Transition: home health care    Connection to Community       Patient Choice:   Offered/Gave Vendor List: yes  Patient's Choice of Community Agency(s): [facility]  Patient and/or patient guardian/advocate was made aware of their right to choose a provider. A list of eligible providers was presented and  reviewed with the patient and/or patient guardian/advocate in written and/or verbal form. The list delineates providers in the patient’s desired geographic area who are participating in the Medicare program and/or providers contracted with the patient’s primary insurance. The Medicare list and quality ratings were obtained from the Medicare.gov [medicare.gov] website.    Anticipated Discharge Plan:  Met with patient. Provided education and contact information for Care Coordination services.: yes  Anticipated Discharge Disposition: home with home health  Type of Home Care Services: home OT, home PT, nursing      Transportation Needs (SDOH):  Transportation Concerns:    Transportation Anticipated: family or friend will provide  Is Out of Hospital DNR needed at discharge?:      In the past 12 months, has lack of transportation kept you from medical appointments or from getting medications?: No  In the past 12 months, has lack of transportation kept you from meetings, work, or from getting things needed for daily living?: No    Concerns - comments: s/w pt son to complete A, home with [facility], son will provide transport home

## 2023-12-01 NOTE — PROGRESS NOTES
Hospital Medicine Service -  Daily Progress Note       SUBJECTIVE   Interval History: No acute events overnight. Patient seen and examined at bedside. Reports ongoing productive cough with thick sputum.  He underwent left-sided thoracentesis yesterday with 1.2 L out reports improved breathing this morning. He is currently hungry but kept n.p.o. for repeat thoracentesis.     OBJECTIVE      Vital signs in last 24 hours:  Temp:  [36.2 °C (97.2 °F)-36.7 °C (98 °F)] 36.6 °C (97.8 °F)  Heart Rate:  [59-88] 73  Resp:  [16-22] 19  BP: ()/(53-71) 105/53    Intake/Output Summary (Last 24 hours) at 12/1/2023 1612  Last data filed at 12/1/2023 1300  Gross per 24 hour   Intake 360 ml   Output 1050 ml   Net -690 ml     Weights (last 7 days)     Date/Time Weight    12/01/23 0335 73.3 kg (161 lb 8 oz)    11/29/23 23:03:36 74.4 kg (164 lb)          PHYSICAL EXAMINATION      Physical Exam  General: Awake, alert. No acute distress. Elderly   HEENT: PERRL/EOMI; Sclerae anicteric. Moist mucous membranes.   Neck: Supple.No bruits. No adenopathy. + JVD  Lungs: Decreased breath sounds on left side, bilateral lower base crackles  Cardiovascular: Regular rate and rhythm. + systolic murmur   Abdomen: Soft, non tender, non distended. + Ostomy bag with brown stool  Extremities: No edema bilaterally.  Neuro: No focal deficits  Psych: AAOx3; full range affect   LINES, CATHETERS, DRAINS, AIRWAYS, AND WOUNDS   Lines, Drains, and Airways:  Wounds (agree with documentation and present on admission):         Comments:      LABS / IMAGING / TELE      Labs  Results from last 7 days   Lab Units 12/01/23  0321   SODIUM mEQ/L 142   POTASSIUM mEQ/L 3.7   CHLORIDE mEQ/L 106   CO2 mEQ/L 27   CREATININE mg/dL 1.6*   BUN mg/dL 30*     Results from last 7 days   Lab Units 12/01/23  0321   WBC K/uL 5.93   HEMOGLOBIN g/dL 11.1*   HEMATOCRIT % 34.6*   PLATELETS K/uL 139*       Imaging  No results found for this or any previous visit.    X-RAY CHEST 1  VIEW  Narrative: CLINICAL HISTORY: s/p left thoracentesis    COMPARISON: Most Recent Prior Chest X Ray.    COMMENT:    1 view of the chest was performed.    Median sternotomy wires are present.  Cardiac monitoring leads overlie the patient.  The lungs are clear.  Small to moderate pleural effusions, increased on the right compared to the  prior study.. No pneumothorax  Cardiomediastinal silhouette is stable.  Impression: IMPRESSION:    Bilateral pleural effusions, enlarged on the right compared to the prior study.  No other significant interval change  IR THORACENTESIS  Narrative: CLINICAL HISTORY: Residual left pleural effusion.    COMMENT: The patient was referred for ultrasound-guided thoracentesis on the  left side.  Informed consent was obtained.  With the patient sitting, ultrasound  imaging was performed and a large size pleural effusion was identified.  The  fluid was localized and a site was selected on the skin with ultrasound.  The  site was marked.  Using sterile technique, under local anesthesia, a 4-Turkish  valved Banchaeh catheter was inserted into the pleural space. 900 mL of clear yellow  fluid were removed.  The catheter was removed and a sterile dressing was placed  over the catheter insertion site. A moderate pleural effusion remained post  procedure.  This procedure was discontinued prior to complete evacuation of the  effusion because of patient discomfort.  The fluid was discarded.  The patient  tolerated the procedure well.    An erect frontal chest film was obtained immediately following the procedure and  will be separately reported.    This service rendered by Betsy Benitez PA-C  Impression: IMPRESSION: Successful ultrasound-guided left thoracentesis with 900 mL removed  and discarded.    I certify that I have personally reviewed this examination and agree with  Betsy Benitez's report.  Gigi Berry M.D.      ECG/Telemetry  Reviewed     ASSESSMENT AND PLAN      Acute on chronic systolic  (congestive) heart failure (CMS/[facility])  Assessment & Plan  Follows with Dr. Jeffrey cardiology  -CABG in 1996; adenosine MPI negative for ischemia in 6/13  -Maintained on Plavix and torsemide 10 mg daily with as needed doses available for weight gain  -TTE (1/2023): 40-45%; ASM; severely dilated RV; severe TR; large left pleural effusion; RVSP calculated at 31 mmHg which is likely underestimated; IVC is very dilated at 5 cm  -Admit with CHF order set  -Home diuretic torsemide 10 mg, reduced from previous dose of 20 mg  -Hold home diuretic and continue with 40 mg IV Lasix twice daily  -Monitor for hypotension and pull back from IV diuresis as needed  -Cards consulted, follow recommendations      * Pleural effusion on left  Assessment & Plan  -CXR showed cardiomegaly and pulmonary edema. Large left pleural effusion.  -Notably not hypoxic in the ED, saturating 96% on room air. Symptomatic with cough    -Exudative by Light's criteria on prior admission in August. Repeat fluid ounces this admission again with exudative fluid  -Underwent thoracentesis x 2 (1.2 L, 900 cc)  -Likely in the setting of CHF exacerbation  -Continue IV diuresis    Cough  Assessment & Plan  Differential is broad and multifactorial but is likely due to volume overload/pleural effusion in the absence of systemic symptoms to suggest pneumonia, aspiration is on the differential as well however the cough is present independent of mealtimes (in addition to mealtimes)   -SLP consult  -As needed guaifenesin to start, consider benzonatate if needed  -Duration is greater than 1 week but acutely worsened over the last 48 hours, no indication for antibiotics presenting with acute bacterial bronchitis or pneumonia.  Patient is on antibiotics for UTI as per separate problem    Hypothyroidism  Assessment & Plan  -Repeat TFT with TSH 0.02 and T4 1.89  -Home dose Levothyroxine 137mcg  -Will decrease to 112mcg  -Will need outpatient follow-up for repeat labs in 4  to 6 weeks    Atrial fibrillation (CMS/HCC)  Assessment & Plan  Follows with Dr. Jeffrey cardiology OV 9/5/23  - Not on anticoagulation due to Hx of rectal bleeding, followed by GI at [facility]  - He considered NISH closure device and has decided to not pursue  -Rate controlled A-fib on admission, asymptomatic  -Continue home metoprolol succinate 25 mg, consistent fill history    Ischemic congestive cardiomyopathy (CMS/[facility])  Assessment & Plan  Follows with Dr. Jeffrey cardiology  -CABG in 1996; adenosine MPI negative for ischemia in 6/13  -Maintained on Plavix and torsemide 10 mg daily with as needed doses available for weight gain  -TTE (1/2023): 40-45%; ASM; severely dilated RV; severe TR; large left pleural effusion; RVSP calculated at 31 mmHg which is likely underestimated; IVC is very dilated at 5 cm      UTI (urinary tract infection)  Assessment & Plan  Diagnosed outpatient, on ciprofloxacin 500 mg twice daily prescribed on 11/28 for 7-day course  -Will order on admission and plan to complete course     Depression  Assessment & Plan  Continue home escitalopram and mirtazapine    Hyperlipidemia  Assessment & Plan  Previously had been on atorvastatin 40 mg, last dispense for 30 tablets in 12/2022  -Hold on admission seeing likely not taking  -Check lipid panel for completeness in the morning       VTE Assessment: Padua    VTE Prophylaxis:  Current anticoagulants:    •None    SCDs  Code Status: DNR (A.N.D.)      Estimated Discharge Date: 12/3/2023     Disposition Planning: Pending improved clinical status and continued IV diuresis, likely within 24-48 hours     Saul Darling MD  12/1/2023

## 2023-12-01 NOTE — HOSPITAL COURSE
Samy is a 89 y.o. male admitted on 11/29/2023 with Pleural effusion on left [J90]  Chest pain, unspecified type [R07.9]  Acute congestive heart failure, unspecified heart failure type (CMS/HCC) [I50.9]. Principal problem is Pleural effusion on left.    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    History of Present Illness   Pleural effusion, UTI, s/p thoracentesis

## 2023-12-01 NOTE — PROGRESS NOTES
Occupational Therapy -  Initial Evaluation     Patient: Samy Elena  Location: [place] [address]  MRN: [id number]  Today's date: 12/1/2023    HISTORY OF PRESENT ILLNESS     Samy is a 89 y.o. male admitted on 11/29/2023 with Pleural effusion on left [J90]  Chest pain, unspecified type [R07.9]  Acute congestive heart failure, unspecified heart failure type (CMS/HCC) [I50.9]. Principal problem is Pleural effusion on left.    Past Medical History  Samy has a past medical history of Aneurysm of internal iliac artery (CMS/HCC), Atrial fibrillation (CMS/HCC), CHF (congestive heart failure) (CMS/[facility]), 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[facility]), Coronary artery disease, Disease of thyroid gland, Will catheter in place, GI (gastrointestinal bleed), Hypertension, Myocardial infarction (CMS/[facility]), Orthostatic hypotension, and TIA (transient ischemic attack).    History of Present Illness   Pleural effusion, UTI, s/p thoracentesis    PRIOR LEVEL OF FUNCTION AND LIVING ENVIRONMENT     Prior Level of Function    Flowsheet Row Most Recent Value   Dominant Hand right   Ambulation assistive equipment   Transferring assistive equipment   Toileting assistive person   Bathing assistive person   Dressing assistive person   Eating independent   IADLs assistive person   Communication understands/communicates without difficulty   Prior Level of Function Comment pt requires 24 hour assist with mobility and ADLs at baseline   Assistive Device Currently Used at Home wheelchair, scale, walker, standard        Prior Living Environment    Flowsheet Row Most Recent Value   People in Home alone  [w 24/7 A]   Current Living Arrangements home   Home Accessibility ramp to enter home   Living Environment Comment pt lives in 2SH, ramp to enter, first floor set up          VITALS AND PAIN     OT Vitals    Date/Time BP Pt Position Who   12/01/23 0854 104/62 Sitting BMN   12/01/23 0908 92/58 Sitting BMN           Objective   OBJECTIVE      Start time:  0852  End time:  0910  Session Length: 18 min  Mode of Treatment: co-treatment, occupational therapy (to expedite DC planning)    General Observations  Patient received  . He was agreeable to therapy, no issues or concerns identified by nurse prior to session. RN cleared pt for tx    Precautions: fall              OT Eval and Treat - 12/01/23 0852        Cognition    Orientation Status oriented x 3     Affect/Mental Status WFL     Follows Commands WFL     Cognitive Function WFL     Comment, Cognition pleasent and cooperative throughout        Vision Assessment/Intervention    Vision Assessment WFL        Hearing Assessment    Hearing Status hearing impairment, bilaterally        Sensory Assessment    Sensory Assessment sensation intact, upper extremities        Upper Extremity Assessment    UE Assessment ROM and Strength WFL        Bed Mobility    Bed Mobility Activities supine to sit     [place] supervision     Safety/Cues increased time to complete     Assistive Device bed rails;head of bed elevated     Comment increased time to engage VC for pacing and safety throughout good carryover no physical A req        Mobility Belt    Mobility Belt Used for All Out of Bed Activity no     Reason Mobility Belt Not Used medical contraindication     Reason Mobility Belt Not Used recent thoracentisis        Sit/Stand Transfer    Surface edge of bed     [place] supervision     Safety/Cues hand placement     Assistive Device walker, front-wheeled     Transfer Comments pt performed from EOB        Functional Mobility    Distance in room/bathroom     Functional Mobility [place] supervision     Safety/Cues increased time to complete;proper use of assistive device     Assistive Device walker, front-wheeled     Functional Mobility Comments slow movement but good control throughout w RW        Grooming    Tasks washes, rinses and dries face;oral care (brushing teeth, cleaning dentures)     [place]  set up     Safety/Cues increased time to complete     Position supported sitting     Adaptive Equipment none     Comment pt performed grooming w HART assist no physical A needed after gathering of supplies        Balance    Static Sitting Balance WFL     Dynamic Sitting Balance WFL     Sit to Stand Dynamic Balance WFL     Static Standing Balance WFL     Dynamic Standing Balance mild impairment;supported;standing     Comment, Balance w RW        Impairments/Safety Issues    Impairments Affecting Function balance;endurance/activity tolerance;strength     Functional Endurance fair slightly below baseline                               Education Documentation  Signs/Symptoms, taught by Rhoda Knott OT at 12/1/2023  2:03 PM.  Learner: Patient  Readiness: Acceptance  Method: Explanation  Response: Verbalizes Understanding  Comment: edu on OT POC and recommendations    Risk Factors, taught by Rhoda Knott OT at 12/1/2023  2:03 PM.  Learner: Patient  Readiness: Acceptance  Method: Explanation  Response: Verbalizes Understanding  Comment: edu on OT POC and recommendations        Session Outcome  Patient upright, in chair at end of session, chair alarm on, call light in reach, personal items in reach, all needs met. Nursing notified about patient's performance and patient's response to therapy/activity.    AM-PAC™ - ADL (Current Function)     Putting on/taking off regular lower body clothing 3 - A Little   Bathing 3 - A Little   Toileting 3 - A Little   Putting on/taking off regular upper body clothing 3 - A Little   Help for taking care of personal grooming 3 - A Little   Eating meals 4 - None   AM-PAC™ ADL Score 19      ASSESSMENT AND PLAN     Progress Summary  OT Ie comepleted. Pt performed bed mobility w SUP no A needed edu on VC and enagged w HOB elevated. Pt performed STS w CLS. Pt performed functional mobility throughout room and hallway w RW slow movement but no LOB. Pt was HART for grooming task of brushing teeth  able to perform wo A just HART. Pt has 24/7 care at home and A w all ADLs. OT to rec home w cont level of care. OT to follow.    Patient/Family Therapy Goal Statement: get home and eat something    OT Plan    Flowsheet Row Most Recent Value   Rehab Potential good, to achieve stated therapy goals at 12/01/2023 0852   Therapy Frequency 3 times/wk at 12/01/2023 0852   Planned Therapy Interventions activity tolerance training, neuromuscular control/coordination retraining, patient/caregiver education/training, transfer/mobility retraining, adaptive equipment training, functional balance retraining, occupation/activity based interventions, BADL retraining, ROM/therapeutic exercise, cognitive retraining, passive ROM/stretching, strengthening exercise at 12/01/2023 0852          OT Discharge Recommendations    Flowsheet Row Most Recent Value   OT Recommended Discharge Disposition home, home with assistance at 12/01/2023 0852   Anticipated Equipment Needs if Discharged Home (OT) none at 12/01/2023 0852               OT Goals    Flowsheet Row Most Recent Value   Bed Mobility Goal 1    Activity/Assistive Device bed mobility activities, all at 12/01/2023 0852   [place] modified independence at 12/01/2023 0852   Time Frame by discharge at 12/01/2023 0852   Progress/Outcome goal ongoing at 12/01/2023 0852   Transfer Goal 1    Activity/Assistive Device all transfers at 12/01/2023 0852   [place] modified independence at 12/01/2023 0852   Time Frame by discharge at 12/01/2023 0852   Progress/Outcome goal ongoing at 12/01/2023 0852   Dressing Goal 1    Activity/Adaptive Equipment dressing skills, all at 12/01/2023 0852   [place] modified independence at 12/01/2023 0852   Time Frame by discharge at 12/01/2023 0852   Progress/Outcome goal ongoing at 12/01/2023 0852

## 2023-12-01 NOTE — PROGRESS NOTES
Physical Therapy -  Initial Evaluation     Patient: Samy Elena  Location: [place] [address]  MRN: [id number]  Today's date: 12/1/2023    HISTORY OF PRESENT ILLNESS     Samy is a 89 y.o. male admitted on 11/29/2023 with Pleural effusion on left [J90]  Chest pain, unspecified type [R07.9]  Acute congestive heart failure, unspecified heart failure type (CMS/HCC) [I50.9]. Principal problem is Pleural effusion on left.    Past Medical History  Samy has a past medical history of Aneurysm of internal iliac artery (CMS/HCC), Atrial fibrillation (CMS/HCC), CHF (congestive heart failure) (CMS/[facility]), 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[facility]), Coronary artery disease, Disease of thyroid gland, Will catheter in place, GI (gastrointestinal bleed), Hypertension, Myocardial infarction (CMS/[facility]), Orthostatic hypotension, and TIA (transient ischemic attack).    History of Present Illness   Pleural effusion, UTI, s/p thoracentesis    PRIOR LEVEL OF FUNCTION AND LIVING ENVIRONMENT     Prior Level of Function    Flowsheet Row Most Recent Value   Dominant Hand right   Ambulation assistive equipment   Transferring assistive equipment   Toileting assistive person   Bathing assistive person   Dressing assistive person   Eating independent   IADLs assistive person   Communication understands/communicates without difficulty   Prior Level of Function Comment pt requires 24 hour assist with mobility and ADLs at baseline   Assistive Device Currently Used at Home wheelchair, scale, walker, standard        Prior Living Environment    Flowsheet Row Most Recent Value   Current Living Arrangements home   Home Accessibility ramp to enter home   Living Environment Comment pt lives in 2SH, ramp to enter, first floor set up        VITALS AND PAIN     PT Vitals    Date/Time Pulse HR Source SpO2 Pt Activity O2 Therapy BP Pt Position Who   12/01/23 0851 67 Monitor 97 % At rest None (Room air) 96/58 Lying BMN   12/01/23 0854 -- -- --  -- -- 104/62 Sitting BMN   12/01/23 0908 -- -- -- -- -- 92/58 Sitting BMN      PT Pain    Date/Time Pain Type Acceptable Pain Level Rating: [facility]   12/01/23 0851 Pain Assessment 0 0 BMN           Objective   OBJECTIVE     Start time:  0851  End time:  0910  Session Length: 19 min  Mode of Treatment: physical therapy, co-treatment (going to test later, cotreat for efficiency)    General Observations  Patient received reclined, in bed. He was no issues or concerns identified by nurse prior to session, agreeable to therapy.      Precautions: fall              PT Eval and Treat - 12/01/23 0851        Cognition    Orientation Status oriented x 3     Affect/Mental Status WFL        Sensory Assessment    Sensory Assessment sensation intact, lower extremities        Lower Extremity Assessment    LE Assessment ROM and Strength WFL        Bed Mobility    Bed Mobility Activities supine to sit     [place] supervision     Safety/Cues sequencing;technique     Assistive Device bed rails;head of bed elevated     Comment increased use of bed rails with VC for sequencing but no physical assist required        Sit/Stand Transfer    Surface edge of bed     [place] supervision     Safety/Cues hand placement     Assistive Device walker, front-wheeled     Transfer Comments from EOB        Gait Training    [place], Gait supervision     Safety/Cues hand placement     Assistive Device walker, front-wheeled     Distance in Feet 50 feet     Pattern step-through     Deviations/Abnormal Patterns gait speed decreased;step length decreased     Comment (Gait/Stairs) slow but steady gait pattern, pt states he is at baseline. pt deferred further ambulation        Stairs Training    [place], Stairs not tested     Comment no stairs required        Balance    Static Sitting Balance WFL     Dynamic Sitting Balance WFL     Sit to Stand Dynamic Balance WFL     Static Standing Balance WFL     Dynamic Standing Balance WFL     Comment,  Balance with RW                               Education Documentation  Home Safety, taught by Roshan Silverman, PT at 12/1/2023  1:34 PM.  Learner: Patient  Readiness: Acceptance  Method: Explanation  Response: Needs Reinforcement  Comment: safety with mobility        Session Outcome  Patient upright, in chair at end of session, chair alarm on, all needs met, personal items in reach, call light in reach. Nursing notified about patient's performance, patient's position, and patient's response to therapy/activity.    AM-PAC™ - Mobility (Current Function)     Turning form your back to your side while in flat bed without using bedrails 3 - A Little   Moving from lying on your back to sitting on the side of a flat bed without using bedrails 3 - A Little   Moving to and from a bed to a chair 3 - A Little   Standing up from a chair using your arms 3 - A Little   To walk in a hospital room 3 - A Little   Climbing 3-5 steps with a railing 3 - A Little   AM-PAC™ Mobility Score 18      ASSESSMENT AND PLAN     Progress Summary  pt requires supervision with all mobility, only limited by fatigue, although is at baseline. skilled PT no longer required. Rec home with assist when stable for d/c    Patient/Family Therapy Goals Statement: return home    PT Plan    Flowsheet Row Most Recent Value   Therapy Frequency evaluation only at 12/01/2023 0851          PT Discharge Recommendations    Flowsheet Row Most Recent Value   PT Recommended Discharge Disposition home with assistance at 12/01/2023 0851   Anticipated Equipment Needs if Discharged Home (PT) none  [owns all necessary equipment] at 12/01/2023 0851

## 2023-12-01 NOTE — PLAN OF CARE
Problem: Adult Inpatient Plan of Care  Goal: Plan of Care Review  Outcome: Progressing  Flowsheets (Taken 12/1/2023 1400)  Progress: improving  Outcome Evaluation: OT jovanni completed  Plan of Care Reviewed With: patient     Problem: Self-Care Deficit  Goal: Improved Ability to Complete Activities of Daily Living  Outcome: Progressing

## 2023-12-01 NOTE — PLAN OF CARE
Problem: Adult Inpatient Plan of Care  Goal: Plan of Care Review  Outcome: Progressing  Flowsheets (Taken 12/1/2023 1524)  Progress: improving  Outcome Evaluation: swallow eval completed  Plan of Care Reviewed With: patient     Problem: Swallowing Impairment  Goal: Optimal Eating and Swallowing Without Aspiration  Outcome: Progressing

## 2023-12-01 NOTE — POST-PROCEDURE NOTE
Interventional Radiology Brief Postprocedure Note    Samy Elena     Attending: SOPHIA Seals/ Gigi Berry M.D.    Assistant: n/a    Diagnosis: symptomatic pleural effusion    Description of procedure: US guided left thoracentesis    Contrast: none     Anesthesia:  Local (Lidocaine)    Volume of Lidocaine Utilized (ml): 10ml     Medications: none     Complications: n/a      Estimated Blood Loss: Estimated Blood Loss: None    Anticoagulation: continue plavix    Specimens: 900 ml clear yellow fluid    Findings: Successful US Guided left throacentesis, 900 ml removed, VSS throughout. CXR pending.    12/1/2023 1:15 PM

## 2023-12-01 NOTE — PROGRESS NOTES
[facility]:  PT TO RESUME HOME CARE W/[facility].REFERRAL PROCESSED FOR RESUMPTIONOF CARE FOR YAHIR 12/03/23.  MAZIN   #[id number]

## 2023-12-01 NOTE — PROGRESS NOTES
Patient:  Samy Elena  Location:  [place] [address]  MRN:  [id number]  Today's date:  12/1/2023    SLP Diagnosis  Oral and pharyngeal phases appear functional, suspect esophageal component contributing to intermittent throat clearing with solids    Swallowing Recommendations  Diet Consistency easy to chew (EC7), thin liquids, other (see comments) (Additional sauces/gravies for added moisture with meals)     Medication Administration crushed with pureed   Supervision Level distant supervision needed   Feeding Recommendations allow patient to feed self if maintaining safety, allow extra time for meals, small bites/sips   Posture Recommendations fully upright in chair/chair mode of bed   Swallowing Strategies alternate food and liquid intake   Instrumental Assessment Recommendations reassess via non-instrumental clinical swallow evaluation.     Comments Aspiration/GERD precautions, additional sauces/gravies with meals, SINGLE sips thin liquids.     Summary/Handoff  Pt seen for initial swallow evaluation. 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 Found to have a left pleural effusion on CXR 11/29. [place] was [place] [facility] VFSS 1/5/2023 with aspiration seen of sips of thin liquids via straw, with a positive sensory response which was mostly effective in clearing aspiration away from the airway at that time. Speech therapy recommended a soft & bite-sized solid and mildly thick liquid diet due to medical status at this time, with advancement to thin liquids as deemed appropriate at next LOC. Pt was received today upright and  awake/alert in bed, communicating appropriately. He reports consuming softer regular solids and thin liquids at baseline. PO trials of thin liquids via cup and straw, puree, and regular solids were presented. Oral phase appeared functional across all trials. Intermittent throat clearing following solid trials. No overt s/sx aspiration noted with single sips of thin liquids via cup and straw or with puree solids. Suspect some level of GI component given intermittent coughing post-swallow of solids. Recommend PO diet of easy to chew solids and SINGLE sips of thin liquids with aspiration/GERD precautions and additional gravies/ssauces as able for added moisture. Recommend patient to be upright with all PO intake and following meals to reduce risk of retrograde aspiration. Further GI follow-up may be warranted given pt's hx of GI bleed, GERD, and diverticulitis s/p colostomy. ST to follow for diet tolerance.    Active Diet Orders (From admission, onward)     Start     Ordered    12/01/23 113  Adult Diet Regular; Thin Liquids; Cardiac (Low Sodium/Low Fat); RD/LDN may adjust order  Diet effective now        Question Answer Comment   Diet Texture Regular    Fluid Consistency: Thin Liquids    Other Restriction(s): Cardiac (Low Sodium/Low Fat)    Delegation of Authority. Diet orders written by PA/CRRonald may not be adjusted by RD/LDNs. RD/LDN may adjust order        12/01/23 1899                Samy is a 89 y.o. male admitted on 11/29/2023 with Pleural effusion on left [J90]  Chest pain, unspecified type [R07.9]  Acute congestive heart failure, unspecified heart failure type (CMS/HCC) [I50.9]. Principal problem is Pleural effusion on left.    Past Medical History  Samy has a past medical history of Aneurysm of internal iliac artery (CMS/HCC), Atrial fibrillation (CMS/HCC), CHF (congestive heart failure) (CMS/HCC), 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), Coronary artery disease, Disease of thyroid gland, Will catheter in place, GI  (gastrointestinal bleed), Hypertension, Myocardial infarction (CMS/HCC), Orthostatic hypotension, and TIA (transient ischemic attack).    History of Present Illness   Pleural effusion, UTI, s/p thoracentesis      SLP Pain    Date/Time Pain Type Rating: Rest Rating: Activity [facility]   12/01/23 1440 Pain Assessment 0 - no pain 0 - no pain CK          Prior Living Environment    Flowsheet Row Most Recent Value   People in Home alone  [w 24/7 A]   Current Living Arrangements home   Home Accessibility ramp to enter home   Living Environment Comment pt lives in 2SH, ramp to enter, first floor set up        Prior Level of Function    Flowsheet Row Most Recent Value   Dominant Hand right   Ambulation assistive equipment   Transferring assistive equipment   Toileting assistive person   Bathing assistive person   Dressing assistive person   Eating independent   IADLs assistive person   Communication understands/communicates without difficulty   Swallowing difficulty swallowing foods   Baseline Diet/Method of Nutritional Intake regular, thin liquids   Past History of Dysphagia Pt reports difficulty swallowing tougher solids at baseline. Hx of GERD, prior GI bleed, and diverticulitis s/p colostomy. Prefers to consume softer, regular solids at baseline. VFSS completed 1/5/23 with recommendations for a soft & bite-sized solid and mildly thick liquid diet at that time due to aspiration of thin liquids via straw sips. Pt with a positive sensory response which was mostly effective at clearing aspiration from the airway at that time.   Prior Level of Function Comment Pt was independent with eating/drinking prior to admission.   Assistive Device Currently Used at Home wheelchair, scale, walker, standard           SLP Evaluation and Treatment - 12/01/23 1440        SLP Time Calculation    Start Time 1440     Stop Time 1455     Time Calculation (min) 15 min        General Information    Document Type Initial Evaluation     Mode of Treatment  speech language pathology     Position at Start of Session upright;in bed     Status at Start of Session agreeable to therapy     General Observations of Patient RN cleared pt to be seen for initial swallow evaluation.        Precautions/Limitations/Impairments    Existing Precautions/Restrictions fall         Services    Do You Speak a Language Other Than English at Home? no        Cognition/Psychosocial    Comment, Cognition Awake/alert, communicative, and AAOx4.        Dentition (Oral Motor)    Dentition (Oral Motor) natural dentition        Facial Symmetry (Oral Motor)    Facial Symmetry (Oral Motor) WNL        Lip Function (Oral Motor)    Lip Range of Motion (Oral Motor) WNL        Tongue Function (Oral Motor)    Tongue ROM (Oral Motor) WNL        Jaw Function (Oral Motor)    Jaw Function (Oral Motor) WNL        Cough/Swallow/Gag Reflex (Oral Motor)    Soft Palate/Velum (Oral Motor) WNL     Volitional Throat Clear/Cough (Oral Motor) WNL     Volitional Swallow (Oral Motor) WNL        Vocal Quality/Secretion Management (Oral Motor)    Vocal Quality (Oral Motor) WNL     Secretion Management (Oral Motor) WNL        GRBAS    Grade 1-->slight abnormality     Roughness 0-->none     Breathiness 0-->none     Asthenia 1-->slight abnormality     Strain 0-->none        Auditory Comprehension    Comment, Assessment (Auditory Comprehension) Pt able to comprehend complex conversation with minimal repetitions due to hearing loss and able to follow complex directions without difficulty.        Verbal Expression    Comment, Assessment (Verbal Expression) Expressive communication appears within functional limits. No difficulty communicating wants/needs effectively.        Functional Communication Measures    FCM: Swallowing 6-->Level 6        General Swallowing Observations    Current Diet/Method of Nutritional Intake easy to chew (EC7);thin liquids     Signs/Symptoms of Aspiration (Current Diet) other (see comments)    Left pleural effusion likely r/t CHF    Respiratory Support (General Swallowing Observations) none     Comment, Secretions/Suctioning WFL        Food and Liquid Trials (NIS)    Patient Positioning HOB elevated (specify degrees)     Oral Intake/Feeding Performance independent/appropriate self-feeding skills     Liquid Consistencies Evaluated thin liquids     Thin Liquids WFL;intact;straw sips     Food Consistencies Evaluated pureed (PU4);regular     Pureed (PU4) WFL;intact     Regular intact;patient controlled amounts     Oral Preparatory Phase of Swallow WFL     Oral Phase of Swallow WFL     Comment, Oral Phase Functional oral phase across solids and liquids presented.     Pharyngeal Phase of Swallow throat clearing noted after the swallow     Comment, Pharyngeal Phase Throat clearing noted following solid trials. No overt s/sx aspiration noted with thin liquid trials. Suspect GI component related to throat clearing following solids.     Esophageal Phase of Swallow frequent belching;other (see comments)   Hx of GERD, GI bleed, and diverticulitis s/p colostomy    Comment Remained stable on room air.        Swallowing Recommendations    Diet Consistency Recommendations easy to chew (EC7);thin liquids;other (see comments)   Additional sauces/gravies for added moisture with meals    Medication Administration crushed with pureed     Supervision Level for Intake distant supervision needed     Feeding/Delivery Recommendations allow patient to feed self if maintaining safety;allow extra time for meals;small bites/sips     Posture Recommendations fully upright in chair/chair mode of bed     Swallowing Strategies alternate food and liquid intake     Instrumental Assessment Recommendations reassess via non-instrumental clinical swallow evaluation     Comment, Swallowing Recommendations Aspiration/GERD precautions, additional sauces/gravies with meals, SINGLE sips thin liquids.        Swallowing Intervention     Dysphagia/Swallowing Interventions monitor tolerance of;current diet without evidence of aspiration        AM-PAC™ - Cognition (Current Function)    Following/understanding a 10-15 minute speech or presentation? 4 - None     Understanding familiar people during ordinary conversations? 4 - None     Remembering to take medications at the appropriate time? 3 - A little     Remembering where things were placed or put away? 3 - A little     Remembering a list of 3 or 4 errands without writing it down? 3 - A little     Taking care of complicated tasks? 3 - A little     AM-PAC™ Cognition Score 20        SLP Goals    Swallowing Goal Selection oral nutrition/hydration, SLP Goal 1        Session Outcome    Position at End of Session upright;in bed     Status at End of Session bed alarm on;all needs met     Nursing Notified patient's performance;patient's response to therapy/activity        Plan    Rehab Potential good, to achieve stated therapy goals     Therapy Frequency 3 times/wk     Planned Therapy Interventions dysphagia therapy                        Education Documentation  Diet Modification, taught by Elva Wen CCC-SLP at 12/1/2023  3:24 PM.  Learner: Patient  Readiness: Acceptance  Method: Explanation  Response: Verbalizes Understanding  Comment: rationale of diet recommendations and aspiration precations    Signs/Symptoms, taught by Elva Wen CCC-SLP at 12/1/2023  3:24 PM.  Learner: Patient  Readiness: Acceptance  Method: Explanation  Response: Verbalizes Understanding  Comment: rationale of diet recommendations and aspiration precations          SLP Goals    Flowsheet Row Most Recent Value   Oral Nutrition/Hydration Goal 1    Activity effective/safe/independent at 12/01/2023 1440   Time Frame short-term goal (STG) at 12/01/2023 1440   Progress/Outcome good progress toward goal at 12/01/2023 1440

## 2023-12-01 NOTE — CONSULTS
"Brief Nutrition Note    Recommendations   1: Diet advancement as medically feasible to regular       Clinical Course: Patient is a 89 y.o. male who was admitted on 11/29/2023 with a diagnosis of Pleural effusion on left [J90]  Chest pain, unspecified type [R07.9]  Acute congestive heart failure, unspecified heart failure type (CMS/HCC) [I50.9].     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)    • Coronary artery disease    • Disease of thyroid gland    • Will catheter in place    • GI (gastrointestinal bleed)    • Hypertension    • Myocardial infarction (CMS/HCC)    • Orthostatic hypotension    • TIA (transient ischemic attack)      Past Surgical History:   Procedure Laterality Date   • CHOLECYSTECTOMY     • CORONARY ARTERY BYPASS GRAFT     • JOINT REPLACEMENT Left     Knee   • THYROIDECTOMY         Reason for Assessment  Reason For Assessment: physician consult (chf)    [facility] Nutrition Screen Tool  Has patient lost weight without trying?: 1-->Yes, 2-13 lbs  Has patient been eating poorly due to decreased appetite?: 0-->No  [facility] Nutrition Screen Score: 1     Nutrition/Diet History  Food Allergies: no known food allergies    Physical Findings  Gastrointestinal: ostomy (-50 ml per I/O)  Last Bowel Movement: 11/30/23  Skin: pressure injury (sacrum PI stage II)       Nutrition Order  Nutrition Order: does not meet nutritional requirements  Nutrition Order Comments: NPO  Current TF/TPN Regimen: no     Anthropometrics  Height: 185.4 cm (6' 1\")           Current Weight  Weight Method: Standing scale  Weight: 73.3 kg (161 lb 8 oz)     Ideal Body Weight (IBW)  Ideal Body Weight (IBW) (kg): 84.86  % Ideal Body Weight: 87.66            Body Mass Index (BMI)  BMI (Calculated): 21.3                       Labs/Procedures/Meds  Lab Results Reviewed: reviewed, pertinent "   Lab Results   Component Value Date    GLUCOSE 88 12/01/2023    CALCIUM 8.5 (L) 12/01/2023     12/01/2023    K 3.7 12/01/2023    CO2 27 12/01/2023     12/01/2023    BUN 30 (H) 12/01/2023    CREATININE 1.6 (H) 12/01/2023               Medications  Pertinent Medications Reviewed: reviewed, pertinent   • ascorbic acid  500 mg oral q AM   • calcium carbonate  500 mg oral BID   • cefTRIAXone  1 g intravenous q24h INT   • clopidogreL  75 mg oral q AM   • cyanocobalamin  500 mcg oral q AM   • escitalopram  5 mg oral q AM   • furosemide  40 mg intravenous BID (am, 4p)   • levothyroxine  112 mcg oral Daily (6:30a)   • magnesium oxide  400 mg oral BID   • melatonin  3 mg oral Nightly   • multivitamin  1 tablet oral q AM   • pantoprazole  20 mg oral Daily   • thiamine  100 mg oral q AM             Clinical comments:    Nutrition consult acknowledged, pt for CHF education + PI . Education deferred given advanced age. +stage II PI sacrum per LDA. Currently NPO for procedure w/ IR today. Pt reports he has a good appetite at baseline. Offered supplements for additional nourishment given presence of impaired skin integrity, pt declined. Notes he is very hungry this AM, eager for diet advancement post-procedure. Notes she was experiencing N/V w/ coughing fits PTA. Report he is feeling much better this AM, GI sx resolved. Weights reviewed, noting some recent weight fluctuation, likely impacted by fluid shifting given hx of CHF. Dry weight 164# per chart, #. Recommend liberalized diet given advanced age. Will continue to monitor.      Goals: PO intake >70% meals through f/u  Monitor: Po intake, diet order, lab values, weight trends, skin integrity, plan of care    Recommendations: See above       Date: 12/01/23  Signature: BRANDY Norton

## 2023-12-01 NOTE — PROGRESS NOTES
Patient: Samy Elena  Location: [place] [address]  MRN:  [id number]  Today's date:  12/1/2023    Attempted to see patient for therapy. Unable due to medical hold (Attempted to see pt for an initial swallow evaluation. Pt is currently NPO for IR procedure. ST will follow-up again as pt is able and appropriate.).

## 2023-12-01 NOTE — PLAN OF CARE
Problem: Adult Inpatient Plan of Care  Goal: Plan of Care Review  Outcome: Progressing  Flowsheets (Taken 12/1/2023 3619)  Progress: improving  Outcome Evaluation: PT eval completed, rec home with assist  Plan of Care Reviewed With: patient     Problem: Mobility Impairment  Goal: Optimal Mobility  Outcome: Progressing

## 2023-12-01 NOTE — PROGRESS NOTES
Transitions of Care Heart Failure GDMT Review  Pharmacy Note      SUMMARY   Patient admitted to [facility] with Pleural effusion on left. Patient has a history of CHF with an EF of 45-50%.     Upon chart review, patient is on the following Goal Directed Medication Therapy (GDMT):     Ace/ARB/ARNI: Indicated but may not be appropriate. Entresto was discontinued during a prior admission due to hypotension.   BB: Indicated but may not be appropriate. Metoprolol was discontinued during a prior admission due to hypotension.   MRA: Indicated   SGLT2i: Not appropriate due to history of Justin's gangrene     To optimize patient's medication therapy, please consider adding the following medication(s):    - ACE/ARB/ARNI, BB, MRA pending hemodynamic parameters and co-morbidities.     In regards to new medication(s), please reach out to CM/SW for price checking/affordability for patient.        MEDICATIONS     Medication List    Current Facility-Administered Medications:     ascorbic acid (VITAMIN C) tablet 500 mg, 500 mg, oral, q AM, Iván Villasenor PA C, 500 mg at 11/30/23 0838    calcium carbonate (TUMS) chewable tablet 500 mg, 500 mg, oral, BID, Iván Villasenor PA C, 500 mg at 11/30/23 2110    cefTRIAXone (ROCEPHIN) IVPB 1 g in 100 mL NSS vial in bag, 1 g, intravenous, q24h INT, Desire Zavala MD, Stopped at 11/30/23 0908    clopidogreL (PLAVIX) tablet 75 mg, 75 mg, oral, q AM, Iván Villasenor PA C, 75 mg at 11/30/23 0839    cyanocobalamin (VITAMIN B12) tablet 500 mcg, 500 mcg, oral, q AM, Iván Villasenor PA C, 500 mcg at 11/30/23 0840    escitalopram (LEXAPRO) tablet 5 mg, 5 mg, oral, q AM, Iván Villasenor PA C, 5 mg at 11/30/23 0838    furosemide (LASIX) injection 40 mg, 40 mg, intravenous, BID (am, 4p), Iván Villasenor PA C, 40 mg at 11/30/23 1729    levothyroxine (SYNTHROID) tablet 112 mcg, 112 mcg, oral, Daily (6:30a), Saul Darling MD, 112 mcg at 12/01/23 0503    magnesium oxide  (MAG-OX) tablet 400 mg, 400 mg, oral, BID, Iván Villasenor PA C, 400 mg at 11/30/23 2109    magnesium sulfate IVPB 1g in 100 mL NSS/D5W/SWFI, 1 g, intravenous, PRN **OR** magnesium sulfate IVPB 2g in 50 mL NSS/D5W/SWFI, 2 g, intravenous, PRN, Saul Darling MD    melatonin tablet 3 mg, 3 mg, oral, Nightly, Iván Villasenor PA C, 3 mg at 11/30/23 2109    multivitamin tablet 1 tablet, 1 tablet, oral, q AM, Iván Villasenor PA C, 1 tablet at 11/30/23 0838    pantoprazole (PROTONIX) tablet,delayed release (DR/EC) 20 mg, 20 mg, oral, Daily, Iván Villasenor PA C, 20 mg at 11/30/23 0838    potassium chloride (KLOR-CON M) ER tablet (particles/crystals) 20 mEq, 20 mEq, oral, PRN, Saul Darling MD    potassium chloride (KLOR-CON M) ER tablet (particles/crystals) 40 mEq, 40 mEq, oral, PRN, Saul Darling MD    potassium chloride 20 mEq in 100 mL IVPB  (premix), 20 mEq, intravenous, PRN, Saul Darling MD    potassium chloride 20 mEq in 100 mL IVPB  (premix), 20 mEq, intravenous, PRN **AND** potassium chloride 20 mEq in 100 mL IVPB  (premix), 20 mEq, intravenous, PRN, Saul Darling MD    thiamine (VITAMIN B1) tablet 100 mg, 100 mg, oral, q AM, Iván Villasenor PA C, 100 mg at 11/30/23 0839        Michelle Mendez, PharmD, Transitions of Care Clinical Pharmacist    [place]i: [phone]  Office: [phone]  (Time Spent: 15 minutes)  12/1/2023

## 2023-12-01 NOTE — PLAN OF CARE
Problem: Adult Inpatient Plan of Care  Goal: Plan of Care Review  Outcome: Progressing  Flowsheets (Taken 12/1/2023 0312)  Progress: improving  Outcome Evaluation: AAO4. VSS. Pt denies pain or SOB. Incontinence care provided. Colostomy care provided. Pt made NPO for IR today. Pt needs are met, FSP maintained and call bell in reach.  Plan of Care Reviewed With: patient   Plan of Care Review  Plan of Care Reviewed With: patient  Progress: improving  Outcome Evaluation: AAO4. VSS. Pt denies pain or SOB. Incontinence care provided. Colostomy care provided. Pt made NPO for IR today. Pt needs are met, FSP maintained and call bell in reach.

## 2023-12-02 LAB
ANION GAP SERPL CALC-SCNC: 9 MEQ/L (ref 3–15)
BASOPHILS # BLD: 0.03 K/UL (ref 0.01–0.1)
BASOPHILS NFR BLD: 0.5 %
BUN SERPL-MCNC: 33 MG/DL (ref 7–25)
CALCIUM SERPL-MCNC: 8.2 MG/DL (ref 8.6–10.3)
CHLORIDE SERPL-SCNC: 106 MEQ/L (ref 98–107)
CO2 SERPL-SCNC: 26 MEQ/L (ref 21–31)
CREAT SERPL-MCNC: 1.6 MG/DL (ref 0.7–1.3)
DIFFERENTIAL METHOD BLD: ABNORMAL
EOSINOPHIL # BLD: 0.12 K/UL (ref 0.04–0.54)
EOSINOPHIL NFR BLD: 1.9 %
ERYTHROCYTE [DISTWIDTH] IN BLOOD BY AUTOMATED COUNT: 17.2 % (ref 11.6–14.4)
GFR SERPL CREATININE-BSD FRML MDRD: 40.6 ML/MIN/1.73M*2
GLUCOSE SERPL-MCNC: 96 MG/DL (ref 70–99)
HCT VFR BLDCO AUTO: 33.1 % (ref 40.1–51)
HGB BLD-MCNC: 10.7 G/DL (ref 13.7–17.5)
IMM GRANULOCYTES # BLD AUTO: 0.02 K/UL (ref 0–0.08)
IMM GRANULOCYTES NFR BLD AUTO: 0.3 %
LYMPHOCYTES # BLD: 0.85 K/UL (ref 1.2–3.5)
LYMPHOCYTES NFR BLD: 13.6 %
MAGNESIUM SERPL-MCNC: 1.8 MG/DL (ref 1.8–2.5)
MCH RBC QN AUTO: 32 PG (ref 28–33.2)
MCHC RBC AUTO-ENTMCNC: 32.3 G/DL (ref 32.2–36.5)
MCV RBC AUTO: 99.1 FL (ref 83–98)
MONOCYTES # BLD: 0.72 K/UL (ref 0.3–1)
MONOCYTES NFR BLD: 11.5 %
NEUTROPHILS # BLD: 4.51 K/UL (ref 1.7–7)
NEUTS SEG NFR BLD: 72.2 %
NRBC BLD-RTO: 0 %
PDW BLD AUTO: 11.4 FL (ref 9.4–12.4)
PLATELET # BLD AUTO: 130 K/UL (ref 150–350)
POTASSIUM SERPL-SCNC: 3.6 MEQ/L (ref 3.5–5.1)
RBC # BLD AUTO: 3.34 M/UL (ref 4.5–5.8)
SODIUM SERPL-SCNC: 141 MEQ/L (ref 136–145)
WBC # BLD AUTO: 6.25 K/UL (ref 3.8–10.5)

## 2023-12-02 PROCEDURE — 63600000 HC DRUGS/DETAIL CODE: Mod: JZ | Performed by: HOSPITALIST

## 2023-12-02 PROCEDURE — 99233 SBSQ HOSP IP/OBS HIGH 50: CPT | Performed by: HOSPITALIST

## 2023-12-02 PROCEDURE — 63600000 HC DRUGS/DETAIL CODE: Mod: JZ | Performed by: INTERNAL MEDICINE

## 2023-12-02 PROCEDURE — 25800000 HC PHARMACY IV SOLUTIONS: Performed by: INTERNAL MEDICINE

## 2023-12-02 PROCEDURE — 63700000 HC SELF-ADMINISTRABLE DRUG

## 2023-12-02 PROCEDURE — 63700000 HC SELF-ADMINISTRABLE DRUG: Performed by: HOSPITALIST

## 2023-12-02 PROCEDURE — 63700000 HC SELF-ADMINISTRABLE DRUG: Performed by: INTERNAL MEDICINE

## 2023-12-02 PROCEDURE — 85025 COMPLETE CBC W/AUTO DIFF WBC: CPT

## 2023-12-02 PROCEDURE — 21400000 HC ROOM AND CARE CCU/INTERMEDIATE

## 2023-12-02 PROCEDURE — 63600000 HC DRUGS/DETAIL CODE: Mod: JZ

## 2023-12-02 PROCEDURE — 93005 ELECTROCARDIOGRAM TRACING: CPT | Performed by: INTERNAL MEDICINE

## 2023-12-02 PROCEDURE — 80048 BASIC METABOLIC PNL TOTAL CA: CPT

## 2023-12-02 PROCEDURE — 83735 ASSAY OF MAGNESIUM: CPT

## 2023-12-02 PROCEDURE — 99233 SBSQ HOSP IP/OBS HIGH 50: CPT | Performed by: INTERNAL MEDICINE

## 2023-12-02 PROCEDURE — 36415 COLL VENOUS BLD VENIPUNCTURE: CPT

## 2023-12-02 RX ORDER — POTASSIUM CHLORIDE 750 MG/1
20 TABLET, EXTENDED RELEASE ORAL ONCE
Status: COMPLETED | OUTPATIENT
Start: 2023-12-02 | End: 2023-12-02

## 2023-12-02 RX ORDER — POTASSIUM CHLORIDE 14.9 MG/ML
20 INJECTION INTRAVENOUS ONCE
Qty: 100 ML | Refills: 0 | Status: DISCONTINUED | OUTPATIENT
Start: 2023-12-02 | End: 2023-12-02 | Stop reason: SDDI

## 2023-12-02 RX ORDER — LATANOPROST 50 UG/ML
1 SOLUTION/ DROPS OPHTHALMIC NIGHTLY
Status: CANCELLED | OUTPATIENT
Start: 2023-12-02

## 2023-12-02 RX ORDER — LOTEPREDNOL ETABONATE 5 MG/ML
1 SUSPENSION/ DROPS OPHTHALMIC 3 TIMES DAILY
Status: DISCONTINUED | OUTPATIENT
Start: 2023-12-02 | End: 2023-12-07 | Stop reason: HOSPADM

## 2023-12-02 RX ADMIN — Medication 500 MG: at 08:36

## 2023-12-02 RX ADMIN — Medication 3 MG: at 22:42

## 2023-12-02 RX ADMIN — THIAMINE HCL TAB 100 MG 100 MG: 100 TAB at 08:41

## 2023-12-02 RX ADMIN — POTASSIUM CHLORIDE 20 MEQ: 750 TABLET, EXTENDED RELEASE ORAL at 12:44

## 2023-12-02 RX ADMIN — MAGNESIUM OXIDE TAB 400 MG (241.3 MG ELEMENTAL MG) 400 MG: 400 (241.3 MG) TAB at 22:42

## 2023-12-02 RX ADMIN — POTASSIUM CHLORIDE 20 MEQ: 750 TABLET, EXTENDED RELEASE ORAL at 08:46

## 2023-12-02 RX ADMIN — ANTACID TABLETS 500 MG: 500 TABLET, CHEWABLE ORAL at 08:38

## 2023-12-02 RX ADMIN — CEFTRIAXONE SODIUM 1 G: 1 INJECTION, POWDER, FOR SOLUTION INTRAMUSCULAR; INTRAVENOUS at 08:38

## 2023-12-02 RX ADMIN — FUROSEMIDE 40 MG: 10 INJECTION, SOLUTION INTRAMUSCULAR; INTRAVENOUS at 15:27

## 2023-12-02 RX ADMIN — LOTEPREDNOL ETABONATE 1 DROP: 5 SUSPENSION/ DROPS OPHTHALMIC at 22:43

## 2023-12-02 RX ADMIN — LEVOTHYROXINE SODIUM 112 MCG: 0.11 TABLET ORAL at 05:30

## 2023-12-02 RX ADMIN — MULTIPLE VITAMINS W/ MINERALS TAB 1 TABLET: TAB at 08:40

## 2023-12-02 RX ADMIN — PANTOPRAZOLE SODIUM 20 MG: 20 TABLET, DELAYED RELEASE ORAL at 08:41

## 2023-12-02 RX ADMIN — FUROSEMIDE 40 MG: 10 INJECTION, SOLUTION INTRAMUSCULAR; INTRAVENOUS at 08:39

## 2023-12-02 RX ADMIN — MAGNESIUM OXIDE TAB 400 MG (241.3 MG ELEMENTAL MG) 400 MG: 400 (241.3 MG) TAB at 08:40

## 2023-12-02 RX ADMIN — CLOPIDOGREL 75 MG: 75 TABLET ORAL at 08:39

## 2023-12-02 RX ADMIN — CYANOCOBALAMIN TAB 1000 MCG 500 MCG: 1000 TAB at 08:39

## 2023-12-02 RX ADMIN — MAGNESIUM SULFATE HEPTAHYDRATE 2 G: 40 INJECTION, SOLUTION INTRAVENOUS at 10:53

## 2023-12-02 RX ADMIN — ANTACID TABLETS 500 MG: 500 TABLET, CHEWABLE ORAL at 22:41

## 2023-12-02 ASSESSMENT — ENCOUNTER SYMPTOMS
COUGH: 0
SHORTNESS OF BREATH: 1
CHEST TIGHTNESS: 1

## 2023-12-02 NOTE — PLAN OF CARE
Problem: Fall Injury Risk  Goal: Absence of Fall and Fall-Related Injury  Outcome: Progressing     Problem: Heart Failure Comorbidity  Goal: Maintenance of Heart Failure Symptom Control  Outcome: Progressing     Problem: Swallowing Impairment  Goal: Optimal Eating and Swallowing Without Aspiration  Outcome: Progressing

## 2023-12-02 NOTE — PROGRESS NOTES
Cardiology   Consult Note     ASSESSMENT AND RECOMMENDATIONS     # HFmrEF:  Patient has a history of ischemic heart failure with mildly reduced ejection fraction. Patient is maintained on torsemide 10 mg PO BID at home. He presented with shortness of breath and cough and is being diuresed with lasix IV 40 BID. History of Denise's gangrene with SGLT2i use.     Mild JVD elevation on exam, bibasilar crackles and no lower extremity edema. He has a chronic pleural effusion that was tapped with 1.2 L output. . , GFR 40, troponin 24.     - Continue diuresis with lasix 40 mg IV BID with close monitoring of kidney function and daily assessment of volume status   - CHF order set: strict I/Os, daily standing weights, cardiac diet, 2g Na, 2L fluids, daily electrolytes with replete for goal K >4.0 and Mg >2.0, continuous telemetry with reassess need daily  - F/U pleural fluid studies   - Mild troponin elevation likely in the setting of demand ischemia due to heart failure exacerbation     # Permanent afib:  History of atrial fibrillation   He is off any anticoagulation due to his GI bleed   Did not want to pursue NISH closure device in the past and rate controlled now     #CAD:  CABG in 1996   Maintained on plavix       SUBJECTIVE     Admission Date: 11/29/2023   Consult Date: 12/02/23  Reason for Consult: HF exacerbation     HPI  Samy Elena is a 89 y.o. 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 He denies any orthopnea or PND and he reports that he is compliant with his medications.     In the ED, he was given lasix  "IV 40 once. Chest x-ray showed a chronic moderate size left pleural effusion. He is on room air and on lasix 40 IV BID.     Last 24 hours: I personally saw and examined the patient this morning on rounds.  He is feeling less short of breath.    TTE 8/4/23: 45-50%, RV severely dilated and decreased systolic function, dilated RA, dilated IVC   EKG 11/29/23 reviewed: afib, RBBB   Telemetry reviewed: no events     Cardiac history: hmref, afib, CAD  Outpatient Cardiologist: Dr. Roshan Jeffrey at [facility]    ROS: 14-point ROS completed and negative unless otherwise noted above.    OBJECTIVE     PHYSICAL EXAM  Visit Vitals  /62 (BP Location: Left upper arm, Patient Position: Lying)   Pulse 70   Temp 36.4 °C (97.5 °F) (Temporal)   Resp 18   Ht 1.854 m (6' 1\")   Wt 68.3 kg (150 lb 8 oz)   SpO2 96%   BMI 19.86 kg/m²       Intake/Output Summary (Last 24 hours) at 12/2/2023 1406  Last data filed at 12/2/2023 1200  Gross per 24 hour   Intake 1110 ml   Output 100 ml   Net 1010 ml     Body mass index is 19.86 kg/m².  GEN: NAD, appears stated age, lying comfortably in bed  HEENT: EOMI, MMM, still with notable JVD  CV: Irregularly irregular, normal S1/S2, no murmurs  PULM: CTAB, bilateral crackles left >right  GI: SNTND  EXT: no BLE edema  DERM: no visible rashes, bruises, wounds  NEURO: AO3, follows commands appropriately, moves extremities spontaneously  PSYCH: anxious, cooperative      Intake/Output Summary (Last 24 hours) at 12/2/2023 0659  Last data filed at 12/2/2023 0500  24 Hour Net Input/Output from 7AM Yesterday   Intake 1080 ml   Output 900 ml   Net 180 ml     Weights (last 5 days)     Date/Time Weight    12/02/23 0500 68.3 kg (150 lb 8 oz)    12/01/23 0335 73.3 kg (161 lb 8 oz)    11/29/23 23:03:36 74.4 kg (164 lb)          LABS  Results from last 7 days   Lab Units 11/29/23  1728 11/29/23  1459   HIGH SENSITIVE TROPONIN I pg/mL 24.8* 23.4*   BNP pg/mL  --  467*     Results from last 7 days   Lab Units " 12/02/23  0416 12/01/23  0321 11/30/23  0544 11/29/23  1459   SODIUM mEQ/L 141 142 143 143   POTASSIUM mEQ/L 3.6 3.7 3.8 4.3   CHLORIDE mEQ/L 106 106 109* 112*   CO2 mEQ/L 26 27 24 26   BUN mg/dL 33* 30* 28* 27*   CREATININE mg/dL 1.6* 1.6* 1.4* 1.5*   EGFR mL/min/1.73m*2 40.6* 40.6* 47.4* 43.8*   ALBUMIN g/dL  --   --   --  3.4*   BILIRUBIN TOTAL mg/dL  --   --   --  1.0   ALK PHOS IU/L  --   --   --  110   ALT IU/L  --   --   --  8   AST IU/L  --   --   --  42*   GLUCOSE mg/dL 96 88 120* 150*     Results from last 7 days   Lab Units 12/02/23 0416 12/01/23 0321 11/30/23  0544   WBC K/uL 6.25 5.93 5.52   HEMOGLOBIN g/dL 10.7* 11.1* 10.2*   HEMATOCRIT % 33.1* 34.6* 32.9*   PLATELETS K/uL 130* 139* 122*     Lab Results   Component Value Date    CHOL 105 08/04/2023    HDL 27 (L) 08/04/2023    LDLCALC 60 08/04/2023    TRIG 89 08/04/2023    TSH 0.02 (L) 11/29/2023    HGBA1C 6.4 (H) 08/03/2023       IMAGING  IR THORACENTESIS    Result Date: 12/2/2023  IMPRESSION: Successful ultrasound-guided left thoracentesis with 900 mL removed and discarded. I certify that I have personally reviewed this examination and agree with Betsy Benitez's report. Gigi Berry M.D.    X-RAY CHEST 1 VIEW    Result Date: 12/1/2023  IMPRESSION: Bilateral pleural effusions, enlarged on the right compared to the prior study. No other significant interval change    X-RAY CHEST 1 VIEW    Result Date: 11/30/2023  IMPRESSION: Decreased size of the left-sided pleural effusion/consolidation status post thoracentesis. No definite left pneumothorax. There is still moderate residual left effusion/consolidation.    IR THORACENTESIS    Result Date: 11/30/2023  IMPRESSION: Successful ultrasound-guided left-sided thoracentesis with 1.2 L pleural fluid removed and sent to the lab.  All components of the time-out, debriefing, and handling of the specimen were conducted as per the ASAP Specimen Protocol. I certify that I have personally reviewed this examination  and agree with Avril Nava's report. Constantino Potts MD    X-RAY CHEST 2 VIEWS    Result Date: 11/29/2023  IMPRESSION: Cardiomegaly and pulmonary edema. Large left pleural effusion      CARDIAC EXAMS  Most recent Echo results:    TRANSTHORACIC ECHO (TTE) COMPLETE 08/04/2023 (Final) 8/4/2023    Interpretation Summary  Technically difficult study. Patient in atrial fibrillation with rates 75-85 bpm at time of examination.    The left ventricular cavity size is small due to compression from RV with normal wall thickness and mildly reduced systolic function. Estimated EF 45 to 50 %. D shaped left ventricular cavity in the setting of severe RVoverload Unable to assess diastolic function due to atrial arrhythmia.    The aortic valve is tricuspid. Aortic valve and root sclerosis. Mild aortic regurgitation.    The visualized portion of the aortic root is normal in size.    The mitral valve is thickened. Mild mitral annular calcification. Mild mitral regurgitation.    The left atrium is mildly dilated    The right ventricular cavity is severely dilated with severely decreased systolic function. Nunes's sign is present.    The pulmonic valve is not well seen.    Severely dilated tricuspid valve annulus with tricuspid leaflets displaying a large coaptation gap; approximately 1 cm Severe tricuspid regurgitation with an estimated RVSP of at least 35 mmHg, but likely likely much higher in the setting of severe TR.  Diastolic flow reversal in the hepatic veins.    The right atrium is severely dilated.    The IVC is massively dilated and collapses < 50% with inspiration consistent with severely elevated right atrial pressures.    Large pleural effusion; cannot exclude a pericardial component.  No obvious chamber collapse.  Compared to previous echocardiogram from 8/4/22, no significant changes. Results communicated to primary team.              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   • Atrial fibrillation (CMS/HCC)    • CHF (congestive heart failure) (CMS/HCC)    • 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)    • Coronary artery disease    • Disease of thyroid gland    • Will catheter in place    • GI (gastrointestinal bleed)    • Hypertension    • Myocardial infarction (CMS/HCC)    • Orthostatic hypotension    • TIA (transient ischemic attack)      Past Surgical History:   Procedure Laterality Date   • CHOLECYSTECTOMY     • CORONARY ARTERY BYPASS GRAFT     • JOINT REPLACEMENT Left     Knee   • THYROIDECTOMY       Social History     Socioeconomic History   • Marital status:    Tobacco Use   • Smoking status: Never   • Smokeless tobacco: Never   Vaping Use   • Vaping Use: Never used   Substance and Sexual Activity   • Alcohol use: Yes     Comment: social   • Drug use: Never     Social Determinants of Health     Financial Resource Strain: Low Risk  (8/4/2023)    Overall Financial Resource Strain (CARDIA)    • Difficulty of Paying Living Expenses: Not hard at all   Food Insecurity: No Food Insecurity (11/30/2023)    Hunger Vital Sign    • Worried About Running Out of Food in the Last Year: Never true    • Ran Out of Food in the Last Year: Never true   Transportation Needs: No Transportation Needs (12/1/2023)    PRAPARE - Transportation    • Lack of Transportation (Medical): No    • Lack of Transportation (Non-Medical): No   Housing Stability: Low Risk  (12/1/2023)    Housing Stability Vital Sign    • Unable to Pay for Housing in the Last Year: No    • Number of Places Lived in the Last Year: 1    • Unstable Housing in the Last Year: No     No family history on file.    ALLERGIES  Jardiance [empagliflozin]    HOME MEDICATIONS  Current Outpatient Medications   Medication Instructions   • ascorbic acid (VITAMIN C) 500 mg, oral, Every morning   • bimatoprost (LUMIGAN) 0.01 % ophthalmic drops 1 drop, Left Eye, Nightly   • calcium carbonate (TUMS) 200 mg calcium (500 mg) chewable tablet 1  tablet, oral, 2 times daily   • ciprofloxacin (CIPRO) 250 mg, oral, 2 times daily   • clopidogreL (PLAVIX) 75 mg, oral, Every morning   • cyanocobalamin (VITAMIN B12) 500 mcg, oral, Every morning   • escitalopram (LEXAPRO) 5 mg, oral, Every morning   • levothyroxine (SYNTHROID) 137 mcg, oral, Every morning   • loteprednol etabonate (LOTEMAX) 0.5 % ointment 1 Application, Left Eye, 3 times daily   • magnesium oxide (MAG-OX) 400 mg, oral, 2 times daily   • melatonin 3 mg, oral, Nightly   • mirtazapine (REMERON) 15 mg, oral, Nightly, Every other night    • multivitamin tablet 1 tablet, oral, Every morning   • omeprazole OTC (PRILOSEC OTC) 20 mg, oral, Daily before breakfast   • potassium chloride (KLOR-CON) 10 mEq CR tablet 20 mEq, oral, 2 times daily   • thiamine 50 mg, oral, Daily   • tobramycin (TOBREX) 0.3 % ophthalmic solution 1 drop, Left Eye, Daily   • torsemide (DEMADEX) 10 mg, oral, Daily       INPATIENT MEDICATIONS  Scheduled:  • ascorbic acid  500 mg oral q AM   • calcium carbonate  500 mg oral BID   • cefTRIAXone  1 g intravenous q24h INT   • clopidogreL  75 mg oral q AM   • cyanocobalamin  500 mcg oral q AM   • [Provider Managed Hold] escitalopram  5 mg oral q AM   • furosemide  40 mg intravenous BID (am, 4p)   • levothyroxine  112 mcg oral Daily (6:30a)   • magnesium oxide  400 mg oral BID   • melatonin  3 mg oral Nightly   • multivitamin  1 tablet oral q AM   • pantoprazole  20 mg oral Daily   • thiamine  100 mg oral q AM     Continuous Infusions:    PRN:    Connor Price D.O., [facility]

## 2023-12-02 NOTE — PROGRESS NOTES
Hospital Medicine Service -  Daily Progress Note       SUBJECTIVE   Interval History: No acute events overnight. Reports restlessness overall but no complaitns, no restless legs specifically, agreeable to taking potassium but IV was bothering him so this was discontniued     OBJECTIVE      Vital signs in last 24 hours:  Temp:  [36.4 °C (97.5 °F)-36.7 °C (98.1 °F)] 36.4 °C (97.5 °F)  Heart Rate:  [59-90] 80  Resp:  [16-24] 16  BP: ()/(53-60) 117/58    Intake/Output Summary (Last 24 hours) at 12/2/2023 1027  Last data filed at 12/2/2023 0900  Gross per 24 hour   Intake 1410 ml   Output 900 ml   Net 510 ml     Weights (last 7 days)       Date/Time Weight    12/02/23 0500 68.3 kg (150 lb 8 oz)    12/01/23 0335 73.3 kg (161 lb 8 oz)    11/29/23 23:03:36 74.4 kg (164 lb)            PHYSICAL EXAMINATION        General: Awake, alert. No acute distress. Elderly   HEENT: PERRL/EOMI; Sclerae anicteric. Moist mucous membranes.   Neck: Supple.No bruits. No adenopathy. + JVD  Lungs: Decreased breath sounds on left side, bilateral lower base crackles  Cardiovascular: Regular rate and rhythm. + systolic murmur   Abdomen: Soft, non tender, non distended. + Ostomy bag with brown stool  Extremities: No edema bilaterally.  Neuro: No focal deficits  Psych: AAOx3; full range affect   LINES, CATHETERS, DRAINS, AIRWAYS, AND WOUNDS   Lines, Drains, and Airways:  Wounds (agree with documentation and present on admission):     LABS / IMAGING / TELE      Labs reviewed.    Imaging  No results found for this or any previous visit.    IR THORACENTESIS  Narrative: CLINICAL HISTORY: Residual left pleural effusion.    COMMENT: The patient was referred for ultrasound-guided thoracentesis on the  left side.  Informed consent was obtained.  With the patient sitting, ultrasound  imaging was performed and a large size pleural effusion was identified.  The  fluid was localized and a site was selected on the skin with ultrasound.  The  site was  marked.  Using sterile technique, under local anesthesia, a 4-French  valved Yueh catheter was inserted into the pleural space. 900 mL of clear yellow  fluid were removed.  The catheter was removed and a sterile dressing was placed  over the catheter insertion site. A moderate pleural effusion remained post  procedure.  This procedure was discontinued prior to complete evacuation of the  effusion because of patient discomfort.  The fluid was discarded.  The patient  tolerated the procedure well.    An erect frontal chest film was obtained immediately following the procedure and  will be separately reported.    This service rendered by Betsy Benitez PA-C  Impression: IMPRESSION: Successful ultrasound-guided left thoracentesis with 900 mL removed  and discarded.    I certify that I have personally reviewed this examination and agree with  Betsy Benitez's report.  Gigi Berry M.D.      ECG/Telemetry  Reviewed     ASSESSMENT AND PLAN      UTI (urinary tract infection)  Assessment & Plan  Diagnosed outpatient, on ciprofloxacin 500 mg twice daily prescribed on 11/28 for 7-day course  -Will order on admission and plan to complete course     Acute on chronic systolic (congestive) heart failure (CMS/HCC)  Assessment & Plan  Follows with Dr. Jeffrey cardiology  -CABG in 1996; adenosine MPI negative for ischemia in 6/13  -Maintained on Plavix and torsemide 10 mg daily with as needed doses available for weight gain  -TTE (1/2023): 40-45%; ASM; severely dilated RV; severe TR; large left pleural effusion; RVSP calculated at 31 mmHg which is likely underestimated; IVC is very dilated at 5 cm  -Admit with CHF order set  -Home diuretic torsemide 10 mg, reduced from previous dose of 20 mg  -Hold home diuretic and continue with 40 mg IV Lasix twice daily  -Monitor for hypotension and pull back from IV diuresis as needed  -Cards consulted, follow recommendations      * Pleural effusion on left  Assessment & Plan  -CXR showed  cardiomegaly and pulmonary edema. Large left pleural effusion.  -Notably not hypoxic in the ED, saturating 96% on room air. Symptomatic with cough    -Exudative by Light's criteria on prior admission in August. Repeat fluid ounces this admission again with exudative fluid  -Underwent thoracentesis x 2 (1.2 L, 900 cc)  -Likely in the setting of CHF exacerbation  -Continue IV diuresis    Cough  Assessment & Plan  Differential is broad and multifactorial but is likely due to volume overload/pleural effusion in the absence of systemic symptoms to suggest pneumonia, aspiration is on the differential as well however the cough is present independent of mealtimes (in addition to mealtimes)   -SLP consult  -As needed guaifenesin to start, consider benzonatate if needed  -Duration is greater than 1 week but acutely worsened over the last 48 hours, no indication for antibiotics presenting with acute bacterial bronchitis or pneumonia.  Patient is on antibiotics for UTI as per separate problem    Atrial fibrillation (CMS/[facility])  Assessment & Plan  Follows with Dr. Jeffrey cardiology OV 9/5/23  - Not on anticoagulation due to Hx of rectal bleeding, followed by GI at [facility]  - He considered NISH closure device and has decided to not pursue  -Rate controlled A-fib on admission, asymptomatic  -Continue home metoprolol succinate 25 mg, consistent fill history    Ischemic congestive cardiomyopathy (CMS/[facility])  Assessment & Plan  Follows with Dr. Jeffrey cardiology  -CABG in 1996; adenosine MPI negative for ischemia in 6/13  -Maintained on Plavix and torsemide 10 mg daily with as needed doses available for weight gain  -TTE (1/2023): 40-45%; ASM; severely dilated RV; severe TR; large left pleural effusion; RVSP calculated at 31 mmHg which is likely underestimated; IVC is very dilated at 5 cm      Depression  Assessment & Plan  Continue home escitalopram and mirtazapine    Hypothyroidism  Assessment & Plan  -Repeat TFT with TSH 0.02 and T4  1.89  -Home dose Levothyroxine 137mcg  -Will decrease to 112mcg  -Will need outpatient follow-up for repeat labs in 4 to 6 weeks    Hyperlipidemia  Assessment & Plan  Previously had been on atorvastatin 40 mg, last dispense for 30 tablets in 12/2022  -Hold on admission seeing likely not taking  -Check lipid panel for completeness in the morning    Addendum  - CKD3.   Sacrum pressure injury stage 2, present on admission.      VTE Assessment: Padua    VTE Prophylaxis:  Current anticoagulants:    None    SCDs  Code Status: DNR (A.N.D.)      Estimated Discharge Date: 12/3/2023     Disposition Planning: Pending improved clinical status and continued IV diuresis, likely within 24-48 hours     Crystal Alonso, DO  12/2/2023   50 minutes were spent with patient and or family members collecting an (interval) history, performing a pertinent physical exam and coordinating care and decision making with other healthcare providers and specialists in this complex case.

## 2023-12-03 ENCOUNTER — APPOINTMENT (INPATIENT)
Dept: RADIOLOGY | Facility: HOSPITAL | Age: 88
DRG: 291 | End: 2023-12-03
Attending: HOSPITALIST
Payer: MEDICARE

## 2023-12-03 PROBLEM — L89.90 PRESSURE ULCER: Status: ACTIVE | Noted: 2023-12-03

## 2023-12-03 PROBLEM — N18.30 CKD (CHRONIC KIDNEY DISEASE) STAGE 3, GFR 30-59 ML/MIN (CMS/HCC): Status: ACTIVE | Noted: 2023-12-03

## 2023-12-03 LAB
ANION GAP SERPL CALC-SCNC: 11 MEQ/L (ref 3–15)
ATRIAL RATE: 73
BASOPHILS # BLD: 0.04 K/UL (ref 0.01–0.1)
BASOPHILS NFR BLD: 0.7 %
BUN SERPL-MCNC: 34 MG/DL (ref 7–25)
CALCIUM SERPL-MCNC: 8.4 MG/DL (ref 8.6–10.3)
CHLORIDE SERPL-SCNC: 104 MEQ/L (ref 98–107)
CO2 SERPL-SCNC: 26 MEQ/L (ref 21–31)
CREAT SERPL-MCNC: 1.7 MG/DL (ref 0.7–1.3)
DIFFERENTIAL METHOD BLD: ABNORMAL
EOSINOPHIL # BLD: 0.13 K/UL (ref 0.04–0.54)
EOSINOPHIL NFR BLD: 2.2 %
ERYTHROCYTE [DISTWIDTH] IN BLOOD BY AUTOMATED COUNT: 16.9 % (ref 11.6–14.4)
GFR SERPL CREATININE-BSD FRML MDRD: 37.9 ML/MIN/1.73M*2
GLUCOSE SERPL-MCNC: 101 MG/DL (ref 70–99)
HCT VFR BLDCO AUTO: 33 % (ref 40.1–51)
HGB BLD-MCNC: 10.5 G/DL (ref 13.7–17.5)
IMM GRANULOCYTES # BLD AUTO: 0.02 K/UL (ref 0–0.08)
IMM GRANULOCYTES NFR BLD AUTO: 0.3 %
LYMPHOCYTES # BLD: 0.8 K/UL (ref 1.2–3.5)
LYMPHOCYTES NFR BLD: 13.3 %
MAGNESIUM SERPL-MCNC: 2.2 MG/DL (ref 1.8–2.5)
MCH RBC QN AUTO: 31.8 PG (ref 28–33.2)
MCHC RBC AUTO-ENTMCNC: 31.8 G/DL (ref 32.2–36.5)
MCV RBC AUTO: 100 FL (ref 83–98)
MONOCYTES # BLD: 0.64 K/UL (ref 0.3–1)
MONOCYTES NFR BLD: 10.6 %
NEUTROPHILS # BLD: 4.39 K/UL (ref 1.7–7)
NEUTS SEG NFR BLD: 72.9 %
NRBC BLD-RTO: 0 %
PDW BLD AUTO: 11.1 FL (ref 9.4–12.4)
PLATELET # BLD AUTO: 142 K/UL (ref 150–350)
POTASSIUM SERPL-SCNC: 3.8 MEQ/L (ref 3.5–5.1)
QRS DURATION: 180
QT INTERVAL: 466
QTC CALCULATION(BAZETT): 550
R AXIS: 60
RBC # BLD AUTO: 3.3 M/UL (ref 4.5–5.8)
SODIUM SERPL-SCNC: 141 MEQ/L (ref 136–145)
T WAVE AXIS: -19
VENTRICULAR RATE: 84
WBC # BLD AUTO: 6.02 K/UL (ref 3.8–10.5)

## 2023-12-03 PROCEDURE — 63600000 HC DRUGS/DETAIL CODE: Mod: JZ | Performed by: INTERNAL MEDICINE

## 2023-12-03 PROCEDURE — 83735 ASSAY OF MAGNESIUM: CPT

## 2023-12-03 PROCEDURE — 99233 SBSQ HOSP IP/OBS HIGH 50: CPT | Performed by: HOSPITALIST

## 2023-12-03 PROCEDURE — 63700000 HC SELF-ADMINISTRABLE DRUG: Performed by: INTERNAL MEDICINE

## 2023-12-03 PROCEDURE — 80048 BASIC METABOLIC PNL TOTAL CA: CPT

## 2023-12-03 PROCEDURE — 63700000 HC SELF-ADMINISTRABLE DRUG

## 2023-12-03 PROCEDURE — 63600000 HC DRUGS/DETAIL CODE: Mod: JZ

## 2023-12-03 PROCEDURE — 93010 ELECTROCARDIOGRAM REPORT: CPT | Performed by: INTERNAL MEDICINE

## 2023-12-03 PROCEDURE — 85025 COMPLETE CBC W/AUTO DIFF WBC: CPT

## 2023-12-03 PROCEDURE — 99233 SBSQ HOSP IP/OBS HIGH 50: CPT | Performed by: INTERNAL MEDICINE

## 2023-12-03 PROCEDURE — 36415 COLL VENOUS BLD VENIPUNCTURE: CPT

## 2023-12-03 PROCEDURE — 71045 X-RAY EXAM CHEST 1 VIEW: CPT

## 2023-12-03 PROCEDURE — 21400000 HC ROOM AND CARE CCU/INTERMEDIATE

## 2023-12-03 PROCEDURE — 25800000 HC PHARMACY IV SOLUTIONS: Performed by: INTERNAL MEDICINE

## 2023-12-03 PROCEDURE — 63700000 HC SELF-ADMINISTRABLE DRUG: Performed by: HOSPITALIST

## 2023-12-03 RX ORDER — TOBRAMYCIN 3 MG/ML
1 SOLUTION/ DROPS OPHTHALMIC DAILY
Status: DISCONTINUED | OUTPATIENT
Start: 2023-12-03 | End: 2023-12-07 | Stop reason: HOSPADM

## 2023-12-03 RX ORDER — LATANOPROST 50 UG/ML
1 SOLUTION/ DROPS OPHTHALMIC NIGHTLY
Status: DISCONTINUED | OUTPATIENT
Start: 2023-12-03 | End: 2023-12-07 | Stop reason: HOSPADM

## 2023-12-03 RX ORDER — MIRTAZAPINE 15 MG/1
15 TABLET, FILM COATED ORAL NIGHTLY
Status: DISCONTINUED | OUTPATIENT
Start: 2023-12-03 | End: 2023-12-07 | Stop reason: HOSPADM

## 2023-12-03 RX ADMIN — CLOPIDOGREL 75 MG: 75 TABLET ORAL at 09:00

## 2023-12-03 RX ADMIN — FUROSEMIDE 40 MG: 10 INJECTION, SOLUTION INTRAMUSCULAR; INTRAVENOUS at 15:38

## 2023-12-03 RX ADMIN — Medication 500 MG: at 08:59

## 2023-12-03 RX ADMIN — MULTIPLE VITAMINS W/ MINERALS TAB 1 TABLET: TAB at 09:01

## 2023-12-03 RX ADMIN — TOBRAMYCIN 1 DROP: 3 SOLUTION OPHTHALMIC at 17:17

## 2023-12-03 RX ADMIN — LOTEPREDNOL ETABONATE 1 DROP: 5 SUSPENSION/ DROPS OPHTHALMIC at 22:05

## 2023-12-03 RX ADMIN — FUROSEMIDE 40 MG: 10 INJECTION, SOLUTION INTRAMUSCULAR; INTRAVENOUS at 09:04

## 2023-12-03 RX ADMIN — PANTOPRAZOLE SODIUM 20 MG: 20 TABLET, DELAYED RELEASE ORAL at 09:01

## 2023-12-03 RX ADMIN — MAGNESIUM OXIDE TAB 400 MG (241.3 MG ELEMENTAL MG) 400 MG: 400 (241.3 MG) TAB at 09:01

## 2023-12-03 RX ADMIN — CEFTRIAXONE SODIUM 1 G: 1 INJECTION, POWDER, FOR SOLUTION INTRAMUSCULAR; INTRAVENOUS at 09:06

## 2023-12-03 RX ADMIN — LATANOPROST 1 DROP: 50 SOLUTION/ DROPS OPHTHALMIC at 22:05

## 2023-12-03 RX ADMIN — THIAMINE HCL TAB 100 MG 100 MG: 100 TAB at 09:01

## 2023-12-03 RX ADMIN — CYANOCOBALAMIN TAB 1000 MCG 500 MCG: 1000 TAB at 09:00

## 2023-12-03 RX ADMIN — LEVOTHYROXINE SODIUM 112 MCG: 0.11 TABLET ORAL at 06:00

## 2023-12-03 RX ADMIN — MIRTAZAPINE 15 MG: 15 TABLET, FILM COATED ORAL at 22:12

## 2023-12-03 RX ADMIN — ANTACID TABLETS 500 MG: 500 TABLET, CHEWABLE ORAL at 22:04

## 2023-12-03 RX ADMIN — MAGNESIUM OXIDE TAB 400 MG (241.3 MG ELEMENTAL MG) 400 MG: 400 (241.3 MG) TAB at 22:05

## 2023-12-03 RX ADMIN — Medication 3 MG: at 22:11

## 2023-12-03 RX ADMIN — LOTEPREDNOL ETABONATE 1 DROP: 5 SUSPENSION/ DROPS OPHTHALMIC at 09:02

## 2023-12-03 RX ADMIN — ANTACID TABLETS 500 MG: 500 TABLET, CHEWABLE ORAL at 09:00

## 2023-12-03 RX ADMIN — LOTEPREDNOL ETABONATE 1 DROP: 5 SUSPENSION/ DROPS OPHTHALMIC at 14:26

## 2023-12-03 ASSESSMENT — COGNITIVE AND FUNCTIONAL STATUS - GENERAL
WALKING IN HOSPITAL ROOM: 3 - A LITTLE
STANDING UP FROM CHAIR USING ARMS: 3 - A LITTLE
CLIMB 3 TO 5 STEPS WITH RAILING: 3 - A LITTLE
MOVING TO AND FROM BED TO CHAIR: 3 - A LITTLE

## 2023-12-03 NOTE — PROGRESS NOTES
Hospital Medicine Service -  Daily Progress Note       SUBJECTIVE   Interval History: No acute events overnight.   Concerned about pneumonia due to URI symptoms, cough.   Reassurance provided this afternoon by phone, son answered phone in room,   Patient has full time aide at home and family rotates in and out.      OBJECTIVE      Vital signs in last 24 hours:  Temp:  [36.2 °C (97.2 °F)-37 °C (98.6 °F)] 36.9 °C (98.4 °F)  Heart Rate:  [69-78] 72  Resp:  [16-20] 18  BP: ()/(51-74) 128/74    Intake/Output Summary (Last 24 hours) at 12/3/2023 1630  Last data filed at 12/3/2023 1530  Gross per 24 hour   Intake 1370 ml   Output 700 ml   Net 670 ml     Weights (last 7 days)     Date/Time Weight    12/03/23 0459 68.7 kg (151 lb 6.4 oz)    12/02/23 0500 68.3 kg (150 lb 8 oz)    12/01/23 0335 73.3 kg (161 lb 8 oz)    11/29/23 23:03:36 74.4 kg (164 lb)         PHYSICAL EXAMINATION        General: Awake, alert. No acute distress. Elderly   HEENT: PERRL/EOMI; Sclerae anicteric. Moist mucous membranes.   Neck: Supple.No bruits. No adenopathy. + JVD  Lungs: Decreased breath sounds on left side, bilateral lower base crackles  Cardiovascular: Regular rate and rhythm. + systolic murmur   Abdomen: Soft, non tender, non distended. + Ostomy bag with brown stool  Extremities: No edema bilaterally.  Neuro: No focal deficits  Psych: AAOx3; full range affect   LINES, CATHETERS, DRAINS, AIRWAYS, AND WOUNDS   Lines, Drains, and Airways:  Wounds (agree with documentation and present on admission):     LABS / IMAGING / TELE      Labs reviewed.    Imaging  No results found for this or any previous visit.    X-RAY CHEST 1 VIEW  Narrative: CLINICAL HISTORY: Emesis. Upper respiratory symptoms.  Impression: IMPRESSION: Continued bibasilar pleural-parenchymal disease, greater on the left  than right.    COMMENT: The current portable study the chest was compared to that dated  December 1, 2023    There is continued bibasilar  pleural-parenchymal disease with complete  obscuration of the left hemidiaphragm. Retrocardiac volume loss an pleural  effusions are noted, more so on the left than right.    There is no pulmonary vascular congestion or edema.      ECG/Telemetry  Reviewed     ASSESSMENT AND PLAN      UTI (urinary tract infection)  Assessment & Plan  Diagnosed outpatient, on ciprofloxacin 500 mg twice daily prescribed on 11/28 for 7-day course  -Will order on admission and plan to complete course     Acute on chronic systolic (congestive) heart failure (CMS/HCC)  Assessment & Plan  Follows with Dr. Jeffrey cardiology  -CABG in 1996; adenosine MPI negative for ischemia in 6/13  -Maintained on Plavix and torsemide 10 mg daily with as needed doses available for weight gain  -TTE (1/2023): 40-45%; ASM; severely dilated RV; severe TR; large left pleural effusion; RVSP calculated at 31 mmHg which is likely underestimated; IVC is very dilated at 5 cm  -Admit with CHF order set  -Home diuretic torsemide 10 mg, reduced from previous dose of 20 mg  -Hold home diuretic and continue with 40 mg IV Lasix twice daily  -Monitor for hypotension and pull back from IV diuresis as needed  -Cards consulted, follow recommendations      * Pleural effusion on left  Assessment & Plan  -CXR showed cardiomegaly and pulmonary edema. Large left pleural effusion.  -Notably not hypoxic in the ED, saturating 96% on room air. Symptomatic with cough    -Exudative by Light's criteria on prior admission in August. Repeat fluid ounces this admission again with exudative fluid  -Underwent thoracentesis x 2 (1.2 L, 900 cc), -  pleural fluid studies noted:    Pleural protein/serum protein 3.9/7.5 = 0.52; serum LDH not performed  -Likely in the setting of CHF exacerbation  -Continue IV diuresis    Cough  Assessment & Plan  Differential is broad and multifactorial but is likely due to volume overload/pleural effusion in the absence of systemic symptoms to suggest  pneumonia, aspiration is on the differential as well however the cough is present independent of mealtimes (in addition to mealtimes)   -SLP consult  -As needed guaifenesin to start, consider benzonatate if needed  -Duration is greater than 1 week but acutely worsened over the last 48 hours, no indication for antibiotics presenting with acute bacterial bronchitis or pneumonia - CXR without   consolidation 12/03/23  Patient is on antibiotics for UTI as per separate problem      Atrial fibrillation (CMS/[facility])  Assessment & Plan  Follows with Dr. Jeffrey cardiology OV 9/5/23  - Not on anticoagulation due to Hx of rectal bleeding, followed by GI at [facility]  - He considered NISH closure device and has decided to not pursue  -Rate controlled A-fib on admission, asymptomatic  -Continue home metoprolol succinate 25 mg, consistent fill history    Ischemic congestive cardiomyopathy (CMS/[facility])  Assessment & Plan  Follows with Dr. Jeffrey cardiology  -CABG in 1996; adenosine MPI negative for ischemia in 6/13  -Maintained on Plavix and torsemide 10 mg daily with as needed doses available for weight gain  -TTE (1/2023): 40-45%; ASM; severely dilated RV; severe TR; large left pleural effusion; RVSP calculated at 31 mmHg which is likely underestimated; IVC is very dilated at 5 cm      Depression  Assessment & Plan  Continue home escitalopram and mirtazapine (former had been held, resumed now_    Hypothyroidism  Assessment & Plan  -Repeat TFT with TSH 0.02 and T4 1.89  -Home dose Levothyroxine 137mcg  -Will decrease to 112mcg  -Will need outpatient follow-up for repeat labs in 4 to 6 weeks    Hyperlipidemia  Assessment & Plan  Previously had been on atorvastatin 40 mg, last dispense for 30 tablets in 12/2022  -Hold on admission seeing likely not taking  -Check lipid panel for completeness in the morning    Pressure ulcer  Assessment & Plan  Sacrum pressure injury stage 2, present on admission.       Addendum  - CKD3.   Sacrum pressure  injury stage 2, present on admission.      VTE Assessment: Padua    VTE Prophylaxis:  Current anticoagulants:    •None    SCDs  Code Status: DNR (A.N.D.)      Estimated Discharge Date: 12/4/2023     Disposition Planning: Pending improved clinical status and continued IV diuresis, likely within 24-48 hours     Crystal Alonso, DO  12/3/2023   50 minutes were spent with patient and or family members collecting an (interval) history, performing a pertinent physical exam and coordinating care and decision making with other healthcare providers and specialists in this complex case.

## 2023-12-03 NOTE — PROGRESS NOTES
Cardiology   Consult Note     ASSESSMENT AND RECOMMENDATIONS     # HFmrEF with biventricular heart failure/cor pulmonale:  Patient has a history of ischemic heart failure with mildly reduced ejection fraction with severe RV dysfunction and severe TR.     Patient is maintained on torsemide 10 mg PO BID at home. He presented with shortness of breath and cough and is being diuresed with lasix IV 40 BID. History of Denise's gangrene with SGLT2i use.      - Continue diuresis with lasix 40 mg IV BID with close monitoring of kidney function and daily assessment of volume status     - CHF order set: strict I/Os, daily standing weights, cardiac diet, 2g Na, 2L fluids, daily electrolytes with replete for goal K >4.0 and Mg >2.0, continuous telemetry with reassess need daily    - F/U pleural fluid studies reviewed.  Pleural protein/serum protein 3.9/7.5 = 0.52; serum LDH not performed    - Mild troponin elevation likely in the setting of demand ischemia due to heart failure exacerbation     #Severe TR -this makes bedside examination of volume status using JVP extremely difficult.    # Permanent afib:  History of atrial fibrillation   He is off any anticoagulation due to his GI bleed   Did not want to pursue NISH closure device in the past and rate controlled now     #CAD:  CABG in 1996   Maintained on plavix       SUBJECTIVE     Admission Date: 11/29/2023   Consult Date: 12/03/23  Reason for Consult: HF exacerbation     HPI  Samy Elena is a 89 y.o. 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  He is feeling less short of breath, but still complains of coughing    TTE 8/4/23: 45-50%, RV severely dilated and decreased systolic function, dilated RA, dilated IVC   EKG 11/29/23 reviewed: afib, RBBB   Telemetry reviewed: no events     Cardiac history: hmref, afib, CAD  Outpatient Cardiologist: Dr. Roshan Jeffrey at [facility]    ROS: 14-point ROS completed and negative unless otherwise noted above.    OBJECTIVE     PHYSICAL EXAM  Visit Vitals  BP (!) 105/57 (BP Location: Left upper arm, Patient Position: Sitting) Comment: RN notified by SN   Pulse 73   Temp 36.9 °C (98.4 °F) (Oral)   Resp 18   Ht 1.854 m (6' 1\")   Wt 68.7 kg (151 lb 6.4 oz)   SpO2 97%   BMI 19.97 kg/m²       Intake/Output Summary (Last 24 hours) at 12/3/2023 1158  Last data filed at 12/3/2023 1000  Gross per 24 hour   Intake 1370 ml   Output 1000 ml   Net 370 ml     Body mass index is 19.97 kg/m².  GEN: NAD, appears stated age, lying comfortably in bed  HEENT: EOMI, MMM, still with notable JVD & V wave  CV: Irregularly irregular, normal S1/S2, no murmurs  PULM: CTAB, bilateral crackles left >right  GI: SNTND  EXT: no BLE edema  DERM: no visible rashes, bruises, wounds  NEURO: AO3, follows commands appropriately, moves extremities spontaneously  PSYCH: anxious, cooperative      Intake/Output Summary (Last 24 hours) at 12/3/2023 0659  Last data filed at 12/3/2023 0600  24 Hour Net Input/Output from 7AM Yesterday   Intake 1650 ml   Output 1000 ml   Net 650 ml     Weights (last 5 days)     Date/Time Weight    12/03/23 0459 68.7 kg (151 lb 6.4 oz)    12/02/23 0500 68.3 kg (150 lb 8 oz)    12/01/23 0335 73.3 kg (161 lb 8 oz)    11/29/23 23:03:36 74.4 kg (164 lb)          LABS  Results from last 7 days   Lab Units 11/29/23  1728 11/29/23  1459   HIGH SENSITIVE TROPONIN I pg/mL 24.8* 23.4*   BNP pg/mL  --  467*     Results from last 7 days   Lab Units 12/03/23  0514 12/02/23  0416 12/01/23  0321 11/30/23  0544 11/29/23  1459   SODIUM " mEQ/L 141 141 142   < > 143   POTASSIUM mEQ/L 3.8 3.6 3.7   < > 4.3   CHLORIDE mEQ/L 104 106 106   < > 112*   CO2 mEQ/L 26 26 27   < > 26   BUN mg/dL 34* 33* 30*   < > 27*   CREATININE mg/dL 1.7* 1.6* 1.6*   < > 1.5*   EGFR mL/min/1.73m*2 37.9* 40.6* 40.6*   < > 43.8*   ALBUMIN g/dL  --   --   --   --  3.4*   BILIRUBIN TOTAL mg/dL  --   --   --   --  1.0   ALK PHOS IU/L  --   --   --   --  110   ALT IU/L  --   --   --   --  8   AST IU/L  --   --   --   --  42*   GLUCOSE mg/dL 101* 96 88   < > 150*    < > = values in this interval not displayed.     Results from last 7 days   Lab Units 12/03/23  0513 12/02/23  0416 12/01/23  0321   WBC K/uL 6.02 6.25 5.93   HEMOGLOBIN g/dL 10.5* 10.7* 11.1*   HEMATOCRIT % 33.0* 33.1* 34.6*   PLATELETS K/uL 142* 130* 139*     Lab Results   Component Value Date    CHOL 105 08/04/2023    HDL 27 (L) 08/04/2023    LDLCALC 60 08/04/2023    TRIG 89 08/04/2023    TSH 0.02 (L) 11/29/2023    HGBA1C 6.4 (H) 08/03/2023       IMAGING  IR THORACENTESIS    Result Date: 12/2/2023  IMPRESSION: Successful ultrasound-guided left thoracentesis with 900 mL removed and discarded. I certify that I have personally reviewed this examination and agree with Betsy Benitez's report. Gigi Berry M.D.    X-RAY CHEST 1 VIEW    Result Date: 12/1/2023  IMPRESSION: Bilateral pleural effusions, enlarged on the right compared to the prior study. No other significant interval change    X-RAY CHEST 1 VIEW    Result Date: 11/30/2023  IMPRESSION: Decreased size of the left-sided pleural effusion/consolidation status post thoracentesis. No definite left pneumothorax. There is still moderate residual left effusion/consolidation.    IR THORACENTESIS    Result Date: 11/30/2023  IMPRESSION: Successful ultrasound-guided left-sided thoracentesis with 1.2 L pleural fluid removed and sent to the lab.  All components of the time-out, debriefing, and handling of the specimen were conducted as per the ASAP Specimen Protocol. I certify  that I have personally reviewed this examination and agree with Avril Nava's report. Constantino Potts MD    X-RAY CHEST 2 VIEWS    Result Date: 11/29/2023  IMPRESSION: Cardiomegaly and pulmonary edema. Large left pleural effusion      CARDIAC EXAMS  Most recent Echo results:    TRANSTHORACIC ECHO (TTE) COMPLETE 08/04/2023 (Final) 8/4/2023    Interpretation Summary  Technically difficult study. Patient in atrial fibrillation with rates 75-85 bpm at time of examination.    The left ventricular cavity size is small due to compression from RV with normal wall thickness and mildly reduced systolic function. Estimated EF 45 to 50 %. D shaped left ventricular cavity in the setting of severe RVoverload Unable to assess diastolic function due to atrial arrhythmia.    The aortic valve is tricuspid. Aortic valve and root sclerosis. Mild aortic regurgitation.    The visualized portion of the aortic root is normal in size.    The mitral valve is thickened. Mild mitral annular calcification. Mild mitral regurgitation.    The left atrium is mildly dilated    The right ventricular cavity is severely dilated with severely decreased systolic function. Nunes's sign is present.    The pulmonic valve is not well seen.    Severely dilated tricuspid valve annulus with tricuspid leaflets displaying a large coaptation gap; approximately 1 cm Severe tricuspid regurgitation with an estimated RVSP of at least 35 mmHg, but likely likely much higher in the setting of severe TR.  Diastolic flow reversal in the hepatic veins.    The right atrium is severely dilated.    The IVC is massively dilated and collapses < 50% with inspiration consistent with severely elevated right atrial pressures.    Large pleural effusion; cannot exclude a pericardial component.  No obvious chamber collapse.  Compared to previous echocardiogram from 8/4/22, no significant changes. Results communicated to primary team.              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   • Atrial fibrillation (CMS/HCC)    • CHF (congestive heart failure) (CMS/HCC)    • 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)    • Coronary artery disease    • Disease of thyroid gland    • Will catheter in place    • GI (gastrointestinal bleed)    • Hypertension    • Myocardial infarction (CMS/HCC)    • Orthostatic hypotension    • TIA (transient ischemic attack)      Past Surgical History:   Procedure Laterality Date   • CHOLECYSTECTOMY     • CORONARY ARTERY BYPASS GRAFT     • JOINT REPLACEMENT Left     Knee   • THYROIDECTOMY       Social History     Socioeconomic History   • Marital status:    Tobacco Use   • Smoking status: Never   • Smokeless tobacco: Never   Vaping Use   • Vaping Use: Never used   Substance and Sexual Activity   • Alcohol use: Yes     Comment: social   • Drug use: Never     Social Determinants of Health     Financial Resource Strain: Low Risk  (8/4/2023)    Overall Financial Resource Strain (CARDIA)    • Difficulty of Paying Living Expenses: Not hard at all   Food Insecurity: No Food Insecurity (11/30/2023)    Hunger Vital Sign    • Worried About Running Out of Food in the Last Year: Never true    • Ran Out of Food in the Last Year: Never true   Transportation Needs: No Transportation Needs (12/1/2023)    PRAPARE - Transportation    • Lack of Transportation (Medical): No    • Lack of Transportation (Non-Medical): No   Housing Stability: Low Risk  (12/1/2023)    Housing Stability Vital Sign    • Unable to Pay for Housing in the Last Year: No    • Number of Places Lived in the Last Year: 1    • Unstable Housing in the Last Year: No     No family history on file.    ALLERGIES  Jardiance [empagliflozin]    HOME MEDICATIONS  Current Outpatient Medications   Medication Instructions   • ascorbic acid (VITAMIN C) 500 mg, oral, Every morning   • bimatoprost (LUMIGAN) 0.01 % ophthalmic drops 1 drop, Left Eye, Nightly   • calcium  carbonate (TUMS) 200 mg calcium (500 mg) chewable tablet 1 tablet, oral, 2 times daily   • ciprofloxacin (CIPRO) 250 mg, oral, 2 times daily   • clopidogreL (PLAVIX) 75 mg, oral, Every morning   • cyanocobalamin (VITAMIN B12) 500 mcg, oral, Every morning   • escitalopram (LEXAPRO) 5 mg, oral, Every morning   • levothyroxine (SYNTHROID) 137 mcg, oral, Every morning   • loteprednol etabonate (LOTEMAX) 0.5 % ointment 1 Application, Left Eye, 3 times daily   • magnesium oxide (MAG-OX) 400 mg, oral, 2 times daily   • melatonin 3 mg, oral, Nightly   • mirtazapine (REMERON) 15 mg, oral, Nightly, Every other night    • multivitamin tablet 1 tablet, oral, Every morning   • omeprazole OTC (PRILOSEC OTC) 20 mg, oral, Daily before breakfast   • potassium chloride (KLOR-CON) 10 mEq CR tablet 20 mEq, oral, 2 times daily   • thiamine 50 mg, oral, Daily   • tobramycin (TOBREX) 0.3 % ophthalmic solution 1 drop, Left Eye, Daily   • torsemide (DEMADEX) 10 mg, oral, Daily       INPATIENT MEDICATIONS  Scheduled:  • ascorbic acid  500 mg oral q AM   • calcium carbonate  500 mg oral BID   • cefTRIAXone  1 g intravenous q24h INT   • clopidogreL  75 mg oral q AM   • cyanocobalamin  500 mcg oral q AM   • [Provider Managed Hold] escitalopram  5 mg oral q AM   • furosemide  40 mg intravenous BID (am, 4p)   • levothyroxine  112 mcg oral Daily (6:30a)   • loteprednol  1 drop Left Eye TID   • magnesium oxide  400 mg oral BID   • melatonin  3 mg oral Nightly   • multivitamin  1 tablet oral q AM   • pantoprazole  20 mg oral Daily   • thiamine  100 mg oral q AM     Continuous Infusions:    PRN:    Connor Price D.O., [facility]

## 2023-12-03 NOTE — PROGRESS NOTES
Fr. Meza administered Sacrament of the Sick to Judaism patient as well as providing the Eucharist as requested by patient. - Charted by Rev. Tres Dwyer, Spiritual Care Coordinator, [phone]

## 2023-12-04 LAB
ATRIAL RATE: 78
CASE RPRT: NORMAL
GRAM STN SPEC: NORMAL
GRAM STN SPEC: NORMAL
MICROORGANISM SPEC CULT: NORMAL
PATH REPORT.FINAL DX SPEC: NORMAL
PATH REPORT.GROSS SPEC: NORMAL
QRS DURATION: 170
QT INTERVAL: 446
QTC CALCULATION(BAZETT): 536
R AXIS: 79
T WAVE AXIS: -20
VENTRICULAR RATE: 87

## 2023-12-04 PROCEDURE — 63600000 HC DRUGS/DETAIL CODE: Mod: JZ

## 2023-12-04 PROCEDURE — 63600000 HC DRUGS/DETAIL CODE: Mod: JZ | Performed by: INTERNAL MEDICINE

## 2023-12-04 PROCEDURE — 97535 SELF CARE MNGMENT TRAINING: CPT | Mod: GO

## 2023-12-04 PROCEDURE — 92526 ORAL FUNCTION THERAPY: CPT | Mod: GN

## 2023-12-04 PROCEDURE — 63700000 HC SELF-ADMINISTRABLE DRUG: Performed by: HOSPITALIST

## 2023-12-04 PROCEDURE — 63700000 HC SELF-ADMINISTRABLE DRUG

## 2023-12-04 PROCEDURE — 25800000 HC PHARMACY IV SOLUTIONS: Performed by: INTERNAL MEDICINE

## 2023-12-04 PROCEDURE — 99233 SBSQ HOSP IP/OBS HIGH 50: CPT | Performed by: HOSPITALIST

## 2023-12-04 PROCEDURE — 93005 ELECTROCARDIOGRAM TRACING: CPT | Performed by: HOSPITALIST

## 2023-12-04 PROCEDURE — 21400000 HC ROOM AND CARE CCU/INTERMEDIATE

## 2023-12-04 PROCEDURE — 63700000 HC SELF-ADMINISTRABLE DRUG: Performed by: INTERNAL MEDICINE

## 2023-12-04 PROCEDURE — 99232 SBSQ HOSP IP/OBS MODERATE 35: CPT | Performed by: STUDENT IN AN ORGANIZED HEALTH CARE EDUCATION/TRAINING PROGRAM

## 2023-12-04 PROCEDURE — 93010 ELECTROCARDIOGRAM REPORT: CPT | Performed by: INTERNAL MEDICINE

## 2023-12-04 RX ORDER — POLYETHYLENE GLYCOL 3350 17 G/17G
17 POWDER, FOR SOLUTION ORAL DAILY
Status: DISCONTINUED | OUTPATIENT
Start: 2023-12-04 | End: 2023-12-07 | Stop reason: HOSPADM

## 2023-12-04 RX ORDER — FAMOTIDINE 10 MG/1
10 TABLET ORAL DAILY
Status: DISCONTINUED | OUTPATIENT
Start: 2023-12-04 | End: 2023-12-07 | Stop reason: HOSPADM

## 2023-12-04 RX ORDER — VALSARTAN 40 MG/1
20 TABLET ORAL DAILY
Status: DISCONTINUED | OUTPATIENT
Start: 2023-12-05 | End: 2023-12-07 | Stop reason: HOSPADM

## 2023-12-04 RX ADMIN — TOBRAMYCIN 1 DROP: 3 SOLUTION OPHTHALMIC at 10:37

## 2023-12-04 RX ADMIN — LATANOPROST 1 DROP: 50 SOLUTION/ DROPS OPHTHALMIC at 22:23

## 2023-12-04 RX ADMIN — METOPROLOL TARTRATE 6.25 MG: 25 TABLET, FILM COATED ORAL at 22:22

## 2023-12-04 RX ADMIN — POLYETHYLENE GLYCOL 3350 17 G: 17 POWDER, FOR SOLUTION ORAL at 15:32

## 2023-12-04 RX ADMIN — LOTEPREDNOL ETABONATE 1 DROP: 5 SUSPENSION/ DROPS OPHTHALMIC at 15:32

## 2023-12-04 RX ADMIN — FUROSEMIDE 40 MG: 10 INJECTION, SOLUTION INTRAMUSCULAR; INTRAVENOUS at 16:24

## 2023-12-04 RX ADMIN — CYANOCOBALAMIN TAB 1000 MCG 500 MCG: 1000 TAB at 10:34

## 2023-12-04 RX ADMIN — ANTACID TABLETS 500 MG: 500 TABLET, CHEWABLE ORAL at 22:23

## 2023-12-04 RX ADMIN — LOTEPREDNOL ETABONATE 1 DROP: 5 SUSPENSION/ DROPS OPHTHALMIC at 22:24

## 2023-12-04 RX ADMIN — ANTACID TABLETS 500 MG: 500 TABLET, CHEWABLE ORAL at 10:35

## 2023-12-04 RX ADMIN — CLOPIDOGREL 75 MG: 75 TABLET ORAL at 10:35

## 2023-12-04 RX ADMIN — MAGNESIUM OXIDE TAB 400 MG (241.3 MG ELEMENTAL MG) 400 MG: 400 (241.3 MG) TAB at 22:22

## 2023-12-04 RX ADMIN — ESCITALOPRAM OXALATE 5 MG: 5 TABLET, FILM COATED ORAL at 10:36

## 2023-12-04 RX ADMIN — CEFTRIAXONE SODIUM 1 G: 1 INJECTION, POWDER, FOR SOLUTION INTRAMUSCULAR; INTRAVENOUS at 10:36

## 2023-12-04 RX ADMIN — Medication 500 MG: at 10:35

## 2023-12-04 RX ADMIN — THIAMINE HCL TAB 100 MG 100 MG: 100 TAB at 10:35

## 2023-12-04 RX ADMIN — FAMOTIDINE 10 MG: 10 TABLET ORAL at 18:53

## 2023-12-04 RX ADMIN — LOTEPREDNOL ETABONATE 1 DROP: 5 SUSPENSION/ DROPS OPHTHALMIC at 10:37

## 2023-12-04 RX ADMIN — MAGNESIUM OXIDE TAB 400 MG (241.3 MG ELEMENTAL MG) 400 MG: 400 (241.3 MG) TAB at 10:35

## 2023-12-04 RX ADMIN — LEVOTHYROXINE SODIUM 112 MCG: 0.11 TABLET ORAL at 06:19

## 2023-12-04 RX ADMIN — Medication 3 MG: at 22:22

## 2023-12-04 RX ADMIN — PANTOPRAZOLE SODIUM 20 MG: 20 TABLET, DELAYED RELEASE ORAL at 10:35

## 2023-12-04 RX ADMIN — FUROSEMIDE 40 MG: 10 INJECTION, SOLUTION INTRAMUSCULAR; INTRAVENOUS at 10:47

## 2023-12-04 RX ADMIN — MULTIPLE VITAMINS W/ MINERALS TAB 1 TABLET: TAB at 10:36

## 2023-12-04 ASSESSMENT — COGNITIVE AND FUNCTIONAL STATUS - GENERAL
WALKING IN HOSPITAL ROOM: 3 - A LITTLE
MOVING TO AND FROM BED TO CHAIR: 3 - A LITTLE
AFFECT: WFL
DRESSING REGULAR LOWER BODY CLOTHING: 3 - A LITTLE
CLIMB 3 TO 5 STEPS WITH RAILING: 3 - A LITTLE
WALKING IN HOSPITAL ROOM: 3 - A LITTLE
FOLLOWS FAMILIAR CONVERSATION: 4 - NONE
HELP NEEDED FOR PERSONAL GROOMING: 3 - A LITTLE
TAKING CARE OF COMPLICATED TASKS: 3 - A LITTLE
HELP NEEDED FOR BATHING: 3 - A LITTLE
MOVING TO AND FROM BED TO CHAIR: 3 - A LITTLE
TOILETING: 3 - A LITTLE
REMEMBERING 5 ERRANDS WITH NO LIST: 3 - A LITTLE
UNDERSTANDING 10 TO 15 MIN SPEECH: 4 - NONE
DRESSING REGULAR UPPER BODY CLOTHING: 3 - A LITTLE
REMEMBERING WHERE THINGS ARE: 3 - A LITTLE
REMEMBERING TO TAKE MEDICATION: 3 - A LITTLE
STANDING UP FROM CHAIR USING ARMS: 3 - A LITTLE
CLIMB 3 TO 5 STEPS WITH RAILING: 3 - A LITTLE
STANDING UP FROM CHAIR USING ARMS: 3 - A LITTLE
EATING MEALS: 4 - NONE

## 2023-12-04 NOTE — CONSULTS
Gastroenterology  Consultation Note       REASON FOR CONSULT   dysphagia     Consulting Physician: Dr. Alonso   HISTORY OF PRESENT ILLNESS      This is a 89 y.o. 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    Throughout the patient's hospital course, he has been managed for an acute on chronic heart failure exacerbation and a large left-sided pleural effusion.  He underwent thoracentesis x 2 and suspected this pleural effusion is a setting of heart failure.  He has been seen and evaluated by cardiology and is undergoing diuresis with improvement of his volume status.    Over the past 2 to 3 weeks, patient has been experiencing vomiting after eating most of his meals.  He denies any difficulty with swallowing, pain with swallowing.  He denies food or liquids getting stuck in his esophagus or chest.  He does report a prior history of regurgitation of undigested food about 7 to 8 years ago for which she says he saw a gastroenterologist and was put on omeprazole which did slowly help his symptoms.  It is unclear if he underwent an upper endoscopy at this time, the patient cannot remember.  He denies any recent history of food regurgitation since then.  However, after most meals over the past few weeks, he experiences almost immediate vomiting after eating.  Emesis consisted of food or liquid that he just can soon.  He denies any blood in his emesis.  Throughout his hospitalization, he had been doing okay but his symptoms of emesis after eating started again yesterday.  The patient states that he has try to keep track  of different foods that he has been eating but cannot pinpoint any specific food or beverage that causes him to have the symptoms.  He last had a colonoscopy about 8 to 10 years ago and was told that he had a few polyps and recommended not to have any further screening.      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)    • Coronary artery disease    • Disease of thyroid gland    • Will catheter in place    • GI (gastrointestinal bleed)    • Hypertension    • Myocardial infarction (CMS/HCC)    • Orthostatic hypotension    • TIA (transient ischemic attack)        PSHx:  Past Surgical History:   Procedure Laterality Date   • CHOLECYSTECTOMY     • CORONARY ARTERY BYPASS GRAFT     • JOINT REPLACEMENT Left     Knee   • THYROIDECTOMY         PCP:   Zachery Hernandez MD    MEDICATIONS      Prior to Admission medications    Medication Sig Start Date End Date Taking? Authorizing Provider   ascorbic acid (VITAMIN C) 500 mg tablet Take 500 mg by mouth every morning.   Yes ProviderClay MD   bimatoprost (LUMIGAN) 0.01 % ophthalmic drops Administer 1 drop into the left eye nightly.   Yes ProviderClay MD   calcium carbonate (TUMS) 200 mg calcium (500 mg) chewable tablet Take 1 tablet by mouth 2 (two) times a day.   Yes ProviderClay MD   ciprofloxacin (CIPRO) 250 mg tablet Take 250 mg by mouth 2 (two) times a day.   Yes ProviderClay MD   clopidogreL (PLAVIX) 75 mg tablet Take 75 mg  by mouth every morning.   Yes Clay Chambers MD   cyanocobalamin (VITAMIN B12) 500 mcg tablet Take 500 mcg by mouth every morning.   Yes Clay Chambers MD   escitalopram (LEXAPRO) 5 mg tablet Take 5 mg by mouth every morning.   Yes Clay Chambers MD   levothyroxine (SYNTHROID) 137 mcg tablet Take 137 mcg by mouth every morning.   Yes Clay Chambers MD   loteprednol etabonate (LOTEMAX) 0.5 % ointment Apply 1 Application to left eye 3 (three) times a day.   Yes Clay Chambers MD   magnesium oxide (MAG-OX) 400 mg (241.3 mg magnesium) tablet Take 400 mg by mouth 2 (two) times a day.   Yes Clay Chambers MD   melatonin 3 mg tablet Take 3 mg by mouth nightly.   Yes Clay Chambers MD   mirtazapine (REMERON) 15 mg tablet Take 15 mg by mouth nightly. Every other night   Yes Clay Chambers MD   multivitamin tablet Take 1 tablet by mouth every morning.   Yes Clay Chambers MD   omeprazole OTC (PriLOSEC OTC) 20 mg EC tablet Take 20 mg by mouth daily before breakfast.   Yes Clay Chambers MD   potassium chloride (KLOR-CON) 10 mEq CR tablet Take 20 mEq by mouth 2 (two) times a day.   Yes Clay Chambers MD   thiamine 50 mg tablet Take 50 mg by mouth daily.   Yes Clay Chambers MD   tobramycin (TOBREX) 0.3 % ophthalmic solution Administer 1 drop into the left eye daily.   Yes Clay Chambers MD   torsemide (DEMADEX) 10 mg tablet Take 10 mg by mouth daily.   Yes Clay Chambers MD   amLODIPine (NORVASC) 2.5 mg tablet amlodipine 2.5 mg tablet  7/22/22  John Chambers MD   apixaban (ELIQUIS) 2.5 mg tablet   7/22/22  John Chambers MD   hydrochlorothiazide (MICROZIDE) 12.5 mg capsule hydrochlorothiazide 12.5 mg capsule  7/22/22  John Chambers MD   omeprazole (PriLOSEC) 20 mg capsule omeprazole 20 mg capsule,delayed release   TAKE 1 CAPSULE BY MOUTH EVERY DAY IN THE  MORNING  7/22/22  ProviderJohn MD       Home medications were personally reviewed.    ALLERGIES      Jardiance [empagliflozin]    FAMILY HISTORY      No family history on file.    SOCIAL HISTORY      Social History     Socioeconomic History   • Marital status:    Tobacco Use   • Smoking status: Never   • Smokeless tobacco: Never   Vaping Use   • Vaping Use: Never used   Substance and Sexual Activity   • Alcohol use: Yes     Comment: social   • Drug use: Never     Social Determinants of Health     Financial Resource Strain: Low Risk  (8/4/2023)    Overall Financial Resource Strain (CARDIA)    • Difficulty of Paying Living Expenses: Not hard at all   Food Insecurity: No Food Insecurity (11/30/2023)    Hunger Vital Sign    • Worried About Running Out of Food in the Last Year: Never true    • Ran Out of Food in the Last Year: Never true   Transportation Needs: No Transportation Needs (12/1/2023)    PRAPARE - Transportation    • Lack of Transportation (Medical): No    • Lack of Transportation (Non-Medical): No   Housing Stability: Low Risk  (12/1/2023)    Housing Stability Vital Sign    • Unable to Pay for Housing in the Last Year: No    • Number of Places Lived in the Last Year: 1    • Unstable Housing in the Last Year: No       REVIEW OF SYSTEMS      Review of Systems   All other systems reviewed and are negative.      PHYSICAL EXAMINATION      Temp:  [36.4 °C (97.5 °F)-36.9 °C (98.4 °F)] 36.4 °C (97.5 °F)  Heart Rate:  [61-93] 75  Resp:  [16-24] 17  BP: ()/(52-74) 102/59  Body mass index is 19.9 kg/m².    Physical Exam  Constitutional:       General: He is not in acute distress.     Appearance: He is not ill-appearing.   Abdominal:      General: Bowel sounds are normal. There is distension.      Palpations: Abdomen is soft.      Tenderness: There is no abdominal tenderness. There is no guarding or rebound.      Comments: colostomy in place with solid brown stool    Skin:     General: Skin is  warm.   Neurological:      Mental Status: He is alert.         LABS / IMAGING / EKG        Labs  Results from last 7 days   Lab Units 12/03/23  0514 12/02/23  0416 12/01/23  0321 11/30/23  0544 11/29/23  1459   SODIUM mEQ/L 141 141 142   < > 143   POTASSIUM mEQ/L 3.8 3.6 3.7   < > 4.3   CHLORIDE mEQ/L 104 106 106   < > 112*   CO2 mEQ/L 26 26 27   < > 26   BUN mg/dL 34* 33* 30*   < > 27*   CREATININE mg/dL 1.7* 1.6* 1.6*   < > 1.5*   CALCIUM mg/dL 8.4* 8.2* 8.5*   < > 9.0   ALBUMIN g/dL  --   --   --   --  3.4*   BILIRUBIN TOTAL mg/dL  --   --   --   --  1.0   ALK PHOS IU/L  --   --   --   --  110   ALT IU/L  --   --   --   --  8   AST IU/L  --   --   --   --  42*   GLUCOSE mg/dL 101* 96 88   < > 150*    < > = values in this interval not displayed.     Results from last 7 days   Lab Units 11/30/23  1319 11/30/23  0836   INR  1.6 1.9       Results from last 7 days   Lab Units 12/03/23  0514   MAGNESIUM mg/dL 2.2     Results from last 7 days   Lab Units 12/03/23  0513 12/02/23  0416 12/01/23  0321   WBC K/uL 6.02 6.25 5.93   HEMOGLOBIN g/dL 10.5* 10.7* 11.1*   HEMATOCRIT % 33.0* 33.1* 34.6*   PLATELETS K/uL 142* 130* 139*         Imaging  X-RAY CHEST 1 VIEW   Final Result   IMPRESSION: Continued bibasilar pleural-parenchymal disease, greater on the left   than right.      COMMENT: The current portable study the chest was compared to that dated   December 1, 2023      There is continued bibasilar pleural-parenchymal disease with complete   obscuration of the left hemidiaphragm. Retrocardiac volume loss an pleural   effusions are noted, more so on the left than right.      There is no pulmonary vascular congestion or edema.      ECG 12 lead   Final Result      X-RAY CHEST 1 VIEW   Final Result   IMPRESSION:      Bilateral pleural effusions, enlarged on the right compared to the prior study.   No other significant interval change      IR THORACENTESIS   Final Result   IMPRESSION: Successful ultrasound-guided left  thoracentesis with 900 mL removed   and discarded.      I certify that I have personally reviewed this examination and agree with   Betsy Benitez's report.   Gigi Berry M.D.      ECG 12 lead   Final Result      X-RAY CHEST 1 VIEW   Final Result   IMPRESSION: Decreased size of the left-sided pleural effusion/consolidation   status post thoracentesis. No definite left pneumothorax.      There is still moderate residual left effusion/consolidation.      IR THORACENTESIS   Final Result   IMPRESSION: Successful ultrasound-guided left-sided thoracentesis with 1.2 L   pleural fluid removed and sent to the lab.  All components of the time-out,   debriefing, and handling of the specimen were conducted as per the ASAP Specimen   Protocol.      I certify that I have personally reviewed this examination and agree with Avril Nava's report.   Constantino Potts MD      X-RAY CHEST 2 VIEWS   Final Result   IMPRESSION:      Cardiomegaly and pulmonary edema. Large left pleural effusion      EKG 12 lead   ED Interpretation   Afib 74, RBBB, nonspecific St/T      Final Result      ECG 12 lead    (Results Pending)       ASSESSMENT AND PLAN      This is a 89 y.o. 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  Patient has been managed for an acute on chronic heart failure exacerbation.  He underwent thoracentesis x 2 thought to be secondary to his heart failure.  He is undergoing diuresis and now appears more euvolemic.  Gastroenterology consulted for patient's symptoms of vomiting soon after eating most meals.    Impression: Patient with emesis  after eating meals of unclear etiology.  He does not have symptoms of dysphagia or dyne aphasia.  He does have significant abdominal distention on exam and solid stool in his ostomy, rule out constipation as source of N/V. Also on the differential includes, gastroparesis,     Recommendations:   - Obtain abdominal x-ray to evaluate stool burden  -Would recommend starting on bowel regimen with MiraLAX 17 g twice daily, and titrate as needed  - ostomy needs to be changed as full of solid stool on exam  - symptomatic management of nausea and vomiting, zofran prn   - recommend small frequent meals  - pending treatment of constipation, can consider further evaluation of N/V    Mariela Cuellar DO  Gastroenterology Fellow   PGY-4  [id number]           VTE Assessment: [place]    Code Status: DNR (A.N.D.)  Estimated discharge date: 12/4/2023

## 2023-12-04 NOTE — PROGRESS NOTES
"     Cardiology   Progress Note     ASSESSMENT AND RECOMMENDATIONS     #ICM  #HF with mildly reduced EF (45-50%)  #Biventricular heart failure/cor pulmonale  #Severe TR    Patient is maintained on torsemide 10 mg PO BID at home. He presented with shortness of breath and cough and is being diuresed with lasix IV 40 BID. History of Justin's gangrene with SGLT2i use. Follows with Dr. Jeffrey. Severe TR complicates bedside assessment of volume status.    -Continue diuresis with lasix 40 mg IV BID  -Given pt's NYHA stage C, recommend starting metop succ 25 daily and valsartan 40 daily prior to discharge (if BP allows)  -CHF order set: strict I/Os, daily standing weights, cardiac diet, 2g Na, 2L fluids, daily electrolytes with replete for goal K >4.0 and Mg >2.0, continuous telemetry with reassess need daily  -Mild troponin elevation likely in the setting of demand ischemia due to heart failure exacerbation      #Permanent afib  He is off any anticoagulation due to his GI bleed   Did not want to pursue NISH closure device in the past and is rate controlled now      #CAD s/p CABG in 1996  Continue home plavix    Case discussed with primary team. Final recommendations are pending attending co-signature. Please page Cardiology at [phone] with any questions.     Alfredo Solo MD  Cardiology Fellow, PGY4    SUBJECTIVE     NAEO. On interview, patient denies CP. Reports improvement in swelling.  Telemetry reviewed: V paced with PVCs 70s    OBJECTIVE     PHYSICAL EXAM  Visit Vitals  /63 (BP Location: Right upper arm, Patient Position: Lying)   Pulse 81   Temp 36.4 °C (97.5 °F) (Oral)   Resp 17   Ht 1.854 m (6' 1\")   Wt 68.4 kg (150 lb 12.8 oz)   SpO2 98%   BMI 19.90 kg/m²     Body mass index is 19.9 kg/m².  GEN: NAD, appears stated age, lying comfortably in bed  HEENT: EOMI, MMM, JVD 10 cm  CV: irregular rhythm, normal S1/S2, no murmurs  PULM: CTAB, mild bibasilar crackles  GI: SNTND  EXT: trace BLE edema  DERM: no " visible rashes, bruises, wounds  NEURO: AO3, follows commands appropriately, moves extremities spontaneously  PSYCH: anxious, cooperative      Intake/Output Summary (Last 24 hours) at 12/4/2023 0659  Last data filed at 12/4/2023 0400  24 Hour Net Input/Output from 7AM Yesterday   Intake 850 ml   Output 550 ml   Net 300 ml     Weights (last 5 days)     Date/Time Weight    12/04/23 0406 68.4 kg (150 lb 12.8 oz)    12/03/23 0459 68.7 kg (151 lb 6.4 oz)    12/02/23 0500 68.3 kg (150 lb 8 oz)    12/01/23 0335 73.3 kg (161 lb 8 oz)    11/29/23 23:03:36 74.4 kg (164 lb)          LABS  Results from last 7 days   Lab Units 11/29/23  1459   BNP pg/mL 467*     Results from last 7 days   Lab Units 12/03/23  0514 12/02/23 0416 12/01/23  0321 11/30/23  0544 11/29/23  1459   SODIUM mEQ/L 141 141 142   < > 143   POTASSIUM mEQ/L 3.8 3.6 3.7   < > 4.3   CHLORIDE mEQ/L 104 106 106   < > 112*   CO2 mEQ/L 26 26 27   < > 26   BUN mg/dL 34* 33* 30*   < > 27*   CREATININE mg/dL 1.7* 1.6* 1.6*   < > 1.5*   CALCIUM mg/dL 8.4* 8.2* 8.5*   < > 9.0   ALBUMIN g/dL  --   --   --   --  3.4*   BILIRUBIN TOTAL mg/dL  --   --   --   --  1.0   ALK PHOS IU/L  --   --   --   --  110   ALT IU/L  --   --   --   --  8   AST IU/L  --   --   --   --  42*   GLUCOSE mg/dL 101* 96 88   < > 150*    < > = values in this interval not displayed.     Results from last 7 days   Lab Units 12/03/23  0513 12/02/23 0416 12/01/23  0321   WBC K/uL 6.02 6.25 5.93   HEMOGLOBIN g/dL 10.5* 10.7* 11.1*   HEMATOCRIT % 33.0* 33.1* 34.6*   PLATELETS K/uL 142* 130* 139*     Lab Results   Component Value Date    CHOL 105 08/04/2023    HDL 27 (L) 08/04/2023    LDLCALC 60 08/04/2023    TRIG 89 08/04/2023    TSH 0.02 (L) 11/29/2023    HGBA1C 6.4 (H) 08/03/2023       IMAGING  X-RAY CHEST 1 VIEW    Result Date: 12/3/2023  IMPRESSION: Continued bibasilar pleural-parenchymal disease, greater on the left than right. COMMENT: The current portable study the chest was compared to that dated  December 1, 2023 There is continued bibasilar pleural-parenchymal disease with complete obscuration of the left hemidiaphragm. Retrocardiac volume loss an pleural effusions are noted, more so on the left than right. There is no pulmonary vascular congestion or edema.    IR THORACENTESIS    Result Date: 12/2/2023  IMPRESSION: Successful ultrasound-guided left thoracentesis with 900 mL removed and discarded. I certify that I have personally reviewed this examination and agree with Betsy Benitez's report. Gigi Berry M.D.    X-RAY CHEST 1 VIEW    Result Date: 12/1/2023  IMPRESSION: Bilateral pleural effusions, enlarged on the right compared to the prior study. No other significant interval change    X-RAY CHEST 1 VIEW    Result Date: 11/30/2023  IMPRESSION: Decreased size of the left-sided pleural effusion/consolidation status post thoracentesis. No definite left pneumothorax. There is still moderate residual left effusion/consolidation.    IR THORACENTESIS    Result Date: 11/30/2023  IMPRESSION: Successful ultrasound-guided left-sided thoracentesis with 1.2 L pleural fluid removed and sent to the lab.  All components of the time-out, debriefing, and handling of the specimen were conducted as per the ASAP Specimen Protocol. I certify that I have personally reviewed this examination and agree with Avril Nava's report. Constantino Potts MD    X-RAY CHEST 2 VIEWS    Result Date: 11/29/2023  IMPRESSION: Cardiomegaly and pulmonary edema. Large left pleural effusion      CARDIAC EXAMS  Most recent Echo results:    TRANSTHORACIC ECHO (TTE) COMPLETE 08/04/2023 (Final) 8/4/2023    Interpretation Summary  Technically difficult study. Patient in atrial fibrillation with rates 75-85 bpm at time of examination.    The left ventricular cavity size is small due to compression from RV with normal wall thickness and mildly reduced systolic function. Estimated EF 45 to 50 %. D shaped left ventricular cavity in the setting of severe  RVoverload Unable to assess diastolic function due to atrial arrhythmia.    The aortic valve is tricuspid. Aortic valve and root sclerosis. Mild aortic regurgitation.    The visualized portion of the aortic root is normal in size.    The mitral valve is thickened. Mild mitral annular calcification. Mild mitral regurgitation.    The left atrium is mildly dilated    The right ventricular cavity is severely dilated with severely decreased systolic function. Nunes's sign is present.    The pulmonic valve is not well seen.    Severely dilated tricuspid valve annulus with tricuspid leaflets displaying a large coaptation gap; approximately 1 cm Severe tricuspid regurgitation with an estimated RVSP of at least 35 mmHg, but likely likely much higher in the setting of severe TR.  Diastolic flow reversal in the hepatic veins.    The right atrium is severely dilated.    The IVC is massively dilated and collapses < 50% with inspiration consistent with severely elevated right atrial pressures.    Large pleural effusion; cannot exclude a pericardial component.  No obvious chamber collapse.  Compared to previous echocardiogram from 8/4/22, no significant changes. Results communicated to primary team.              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)    • Coronary artery disease    • Disease of thyroid gland    • Will catheter in place    • GI (gastrointestinal bleed)    • Hypertension    • Myocardial infarction (CMS/HCC)    • Orthostatic hypotension    • TIA (transient ischemic attack)      Past Surgical History:   Procedure Laterality Date   • CHOLECYSTECTOMY     • CORONARY ARTERY BYPASS GRAFT     • JOINT REPLACEMENT Left     Knee   • THYROIDECTOMY       Social History     Socioeconomic History   • Marital status:    Tobacco Use   •  Smoking status: Never   • Smokeless tobacco: Never   Vaping Use   • Vaping Use: Never used   Substance and Sexual Activity   • Alcohol use: Yes     Comment: social   • Drug use: Never     Social Determinants of Health     Financial Resource Strain: Low Risk  (8/4/2023)    Overall Financial Resource Strain (CARDIA)    • Difficulty of Paying Living Expenses: Not hard at all   Food Insecurity: No Food Insecurity (11/30/2023)    Hunger Vital Sign    • Worried About Running Out of Food in the Last Year: Never true    • Ran Out of Food in the Last Year: Never true   Transportation Needs: No Transportation Needs (12/1/2023)    PRAPARE - Transportation    • Lack of Transportation (Medical): No    • Lack of Transportation (Non-Medical): No   Housing Stability: Low Risk  (12/1/2023)    Housing Stability Vital Sign    • Unable to Pay for Housing in the Last Year: No    • Number of Places Lived in the Last Year: 1    • Unstable Housing in the Last Year: No     No family history on file.    ALLERGIES  Jardiance [empagliflozin]    HOME MEDICATIONS  Current Outpatient Medications   Medication Instructions   • ascorbic acid (VITAMIN C) 500 mg, oral, Every morning   • bimatoprost (LUMIGAN) 0.01 % ophthalmic drops 1 drop, Left Eye, Nightly   • calcium carbonate (TUMS) 200 mg calcium (500 mg) chewable tablet 1 tablet, oral, 2 times daily   • ciprofloxacin (CIPRO) 250 mg, oral, 2 times daily   • clopidogreL (PLAVIX) 75 mg, oral, Every morning   • cyanocobalamin (VITAMIN B12) 500 mcg, oral, Every morning   • escitalopram (LEXAPRO) 5 mg, oral, Every morning   • levothyroxine (SYNTHROID) 137 mcg, oral, Every morning   • loteprednol etabonate (LOTEMAX) 0.5 % ointment 1 Application, Left Eye, 3 times daily   • magnesium oxide (MAG-OX) 400 mg, oral, 2 times daily   • melatonin 3 mg, oral, Nightly   • mirtazapine (REMERON) 15 mg, oral, Nightly, Every other night    • multivitamin tablet 1 tablet, oral, Every morning   • omeprazole OTC  (PRILOSEC OTC) 20 mg, oral, Daily before breakfast   • potassium chloride (KLOR-CON) 10 mEq CR tablet 20 mEq, oral, 2 times daily   • thiamine 50 mg, oral, Daily   • tobramycin (TOBREX) 0.3 % ophthalmic solution 1 drop, Left Eye, Daily   • torsemide (DEMADEX) 10 mg, oral, Daily       INPATIENT MEDICATIONS  Scheduled:  • ascorbic acid  500 mg oral q AM   • calcium carbonate  500 mg oral BID   • cefTRIAXone  1 g intravenous q24h INT   • clopidogreL  75 mg oral q AM   • cyanocobalamin  500 mcg oral q AM   • escitalopram  5 mg oral q AM   • furosemide  40 mg intravenous BID (am, 4p)   • latanoprost  1 drop Left Eye Nightly   • levothyroxine  112 mcg oral Daily (6:30a)   • loteprednol  1 drop Left Eye TID   • magnesium oxide  400 mg oral BID   • melatonin  3 mg oral Nightly   • mirtazapine  15 mg oral Nightly   • multivitamin  1 tablet oral q AM   • pantoprazole  20 mg oral Daily   • thiamine  100 mg oral q AM   • tobramycin  1 drop Left Eye Daily     Continuous Infusions:    PRN:

## 2023-12-04 NOTE — PLAN OF CARE
Plan of Care Review  Plan of Care Reviewed With: patient  Progress: improving  Outcome Evaluation: Patient AAOx3. Episode of coughing and small amount of emesis after am meds were administered in applesauce. Patient ambulated in room with 1 person assist and walker. VS stable. Diuretics administered as ordered. I/Os monitored.  Problem: Adult Inpatient Plan of Care  Goal: Plan of Care Review  Outcome: Progressing  Flowsheets (Taken 12/4/2023 4655)  Progress: improving  Outcome Evaluation: Patient AAOx3. Episode of coughing and small amount of emesis after am meds were administered in applesauce. Patient ambulated in room with 1 person assist and walker. VS stable. Diuretics administered as ordered. I/Os monitored.  Plan of Care Reviewed With: patient

## 2023-12-04 NOTE — PROGRESS NOTES
Hospital Medicine Service -  Daily Progress Note       SUBJECTIVE   Interval History: No acute events overnight.   Seen alongside RN, coughing and sputum like emesis fit after taking 2 pills. After many minutes was able to then tolerate small spoon of applesauce.    Seen by SLP with rec for GI input    GI consulted    Otherwise w/o concerns today, understands plan of care for diuresis.    Also seen by cards, rec to add metop/valsartan low dose (very low doses tentatively ordered but BP soft)   OBJECTIVE      Vital signs in last 24 hours:  Temp:  [36.3 °C (97.3 °F)-36.4 °C (97.5 °F)] 36.3 °C (97.3 °F)  Heart Rate:  [61-93] 72  Resp:  [16-24] 17  BP: ()/(52-63) 98/56    Intake/Output Summary (Last 24 hours) at 12/4/2023 1720  Last data filed at 12/4/2023 1106  Gross per 24 hour   Intake 300 ml   Output 250 ml   Net 50 ml     Weights (last 7 days)     Date/Time Weight    12/04/23 0406 68.4 kg (150 lb 12.8 oz)    12/03/23 0459 68.7 kg (151 lb 6.4 oz)    12/02/23 0500 68.3 kg (150 lb 8 oz)    12/01/23 0335 73.3 kg (161 lb 8 oz)    11/29/23 23:03:36 74.4 kg (164 lb)         PHYSICAL EXAMINATION        General: Awake, alert. No acute distress. Elderly   HEENT: PERRL/EOMI; Sclerae anicteric. Moist mucous membranes.   Neck: Supple.No bruits. No adenopathy. + JVD  Lungs: Decreased breath sounds on left side, bilateral lower base crackles  Cardiovascular: Regular rate and rhythm. + systolic murmur   Abdomen: Soft, non tender, non distended. + Ostomy bag with brown stool  Extremities: No edema bilaterally.  Neuro: No focal deficits  Psych: AAOx3; full range affect   LINES, CATHETERS, DRAINS, AIRWAYS, AND WOUNDS   Lines, Drains, and Airways:  Wounds (agree with documentation and present on admission):     LABS / IMAGING / TELE      Labs reviewed.    Imaging  No results found for this or any previous visit.    X-RAY CHEST 1 VIEW  Narrative: CLINICAL HISTORY: Emesis. Upper respiratory symptoms.  Impression: IMPRESSION:  Continued bibasilar pleural-parenchymal disease, greater on the left  than right.    COMMENT: The current portable study the chest was compared to that dated  December 1, 2023    There is continued bibasilar pleural-parenchymal disease with complete  obscuration of the left hemidiaphragm. Retrocardiac volume loss an pleural  effusions are noted, more so on the left than right.    There is no pulmonary vascular congestion or edema.      ECG/Telemetry  Reviewed     ASSESSMENT AND PLAN      UTI (urinary tract infection)  Assessment & Plan  Diagnosed outpatient, on ciprofloxacin 500 mg twice daily prescribed on 11/28 for 7-day course  -Will order ceftriaxone on admission  to complete course, last day 12/5.     Acute on chronic systolic (congestive) heart failure (CMS/HCC)  Assessment & Plan  Follows with Dr. Jeffrey cardiology  -CABG in 1996; adenosine MPI negative for ischemia in 6/13  -Maintained on Plavix and torsemide 10 mg daily with as needed doses available for weight gain  -TTE (1/2023): 40-45%; ASM; severely dilated RV; severe TR; large left pleural effusion; RVSP calculated at 31 mmHg which is likely underestimated; IVC is very dilated at 5 cm  -Admit with CHF order set  -Home diuretic torsemide 10 mg, reduced from previous dose of 20 mg  -Hold home diuretic and continue with 40 mg IV Lasix twice daily  -Monitor for hypotension and pull back from IV diuresis as needed  -Cards consulted, follow recommendations      * Pleural effusion on left  Assessment & Plan  -CXR showed cardiomegaly and pulmonary edema. Large left pleural effusion.  -Notably not hypoxic in the ED, saturating 96% on room air. Symptomatic with cough    -Exudative by Light's criteria on prior admission in August. Repeat fluid ounces this admission again with exudative fluid  -Underwent thoracentesis x 2 (1.2 L, 900 cc), -  pleural fluid studies noted:    Pleural protein/serum protein 3.9/7.5 = 0.52; serum LDH not performed  -Likely in the  setting of CHF exacerbation  -Continue IV diuresis    Cough  Assessment & Plan  Suspected volume overload> aspiration,   No s/s PNA (also checked CXR repeat over the weekend),  SLP input was to consult GI,     12/04/23 this morning he had a long coughing and emesis spell following taking 2 pills, after some time he was able to tolerate a small spoonful of applesauce.     Appreciate GI input. Suspect stool burden. Add KUB (not yet done),   pepcid, miralax, zofran prn (all ordered except tigan for latter given current qtc >500).   SLP consult in place    Atrial fibrillation (CMS/[facility])  Assessment & Plan  Follows with Dr. Jeffrey cardiology OV 9/5/23  - Not on anticoagulation due to Hx of rectal bleeding, followed by GI at [facility]  - He considered NISH closure device and has decided to not pursue  -Rate controlled A-fib on admission, asymptomatic  -Continue home metoprolol succinate 25 mg, consistent fill history    Ischemic congestive cardiomyopathy (CMS/[facility])  Assessment & Plan  Follows with Dr. Jeffrey cardiology  -CABG in 1996; adenosine MPI negative for ischemia in 6/13  -Maintained on Plavix and torsemide 10 mg daily with as needed doses available for weight gain  -TTE (1/2023): 40-45%; ASM; severely dilated RV; severe TR; large left pleural effusion; RVSP calculated at 31 mmHg which is likely underestimated; IVC is very dilated at 5 cm  12/04/23 appreciate Cards recs! -rec to add metop/valsartan low dose (very low doses tentatively ordered but BP soft, holding valsartan to see AM BP first)    Depression  Assessment & Plan  Continue home escitalopram and mirtazapine (former had been held, resumed now_    Hypothyroidism  Assessment & Plan  -Repeat TFT with TSH 0.02 and T4 1.89  -Home dose Levothyroxine 137mcg  -Will decrease to 112mcg  -Will need outpatient follow-up for repeat labs in 4 to 6 weeks    Hyperlipidemia  Assessment & Plan  Previously had been on atorvastatin 40 mg, last dispense for 30 tablets in  12/2022  -Hold on admission seeing likely not taking  -Check lipid panel for completeness in the morning    Pressure ulcer  Assessment & Plan  Sacrum pressure injury stage 2, present on admission.       Addendum  - CKD3.   Sacrum pressure injury stage 2, present on admission.      VTE Assessment: Padua    VTE Prophylaxis:  Current anticoagulants:    •None    SCDs  Code Status: DNR (A.N.D.)      Estimated Discharge Date: 12/7/2023     Disposition Planning: Pending improved clinical status and continued IV diuresis, likely within 24-48 hours     Crystal Alonso, DO  12/4/2023   50 minutes were spent with patient and or family members collecting an (interval) history, performing a pertinent physical exam and coordinating care and decision making with other healthcare providers and specialists in this complex case.

## 2023-12-04 NOTE — PLAN OF CARE
Problem: Fall Injury Risk  Goal: Absence of Fall and Fall-Related Injury  Outcome: Progressing     Problem: Heart Failure Comorbidity  Goal: Maintenance of Heart Failure Symptom Control  Outcome: Progressing     Problem: Mobility Impairment  Goal: Optimal Mobility  Outcome: Progressing

## 2023-12-04 NOTE — PROGRESS NOTES
Occupational Therapy -  Daily Treatment/Progress Note     Patient: Samy Elena  Location: [place] [address]  MRN: [id number]  Today's date: 12/4/2023    HISTORY OF PRESENT ILLNESS     Samy is a 89 y.o. male admitted on 11/29/2023 with Pleural effusion on left [J90]  Chest pain, unspecified type [R07.9]  Acute congestive heart failure, unspecified heart failure type (CMS/HCC) [I50.9]. Principal problem is Pleural effusion on left.    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    History of Present Illness   Pleural effusion, UTI, s/p thoracentesis    PRIOR LEVEL OF FUNCTION AND LIVING ENVIRONMENT     Prior Level of Function    Flowsheet Row Most Recent Value   Dominant Hand right   Ambulation assistive equipment   Transferring assistive equipment   Toileting assistive person   Bathing assistive person   Dressing assistive person   Eating independent   IADLs assistive person   Communication understands/communicates without difficulty   Swallowing difficulty swallowing foods   Baseline Diet/Method of Nutritional Intake regular, thin liquids   Past History of Dysphagia Pt reports difficulty swallowing tougher solids at baseline. Hx of GERD, prior GI bleed, and diverticulitis s/p colostomy. Prefers to consume softer, regular solids at baseline. VFSS completed 1/5/23 with recommendations for a soft & bite-sized solid and mildly thick liquid diet at that time due to aspiration of thin liquids via straw sips. Pt with a positive sensory response which was mostly effective at clearing aspiration from the airway at that  time.   Prior Level of Function Comment Pt was independent with eating/drinking prior to admission.   Assistive Device Currently Used at Home wheelchair, scale, walker, standard        Prior Living Environment    Flowsheet Row Most Recent Value   People in Home alone  [w 24/7 A]   Current Living Arrangements home   Home Accessibility ramp to enter home   Living Environment Comment pt lives in 2SH, ramp to enter, first floor set up          VITALS AND PAIN     OT Vitals    Date/Time Pulse HR Source BP BP Method Observations [facility]   12/04/23 1032 93 Monitor 116/63 Automatic Occupational Therapist in with patient. HAW        Objective   OBJECTIVE     Start time:  1030  End time:  1057  Session Length: 27 min  Mode of Treatment: individual therapy, occupational therapy    General Observations  Patient received supine, in bed. He was agreeable to therapy, no issues or concerns identified by nurse prior to session. Pt sleeping, easily awoken and agreeable for therapy    Precautions: aspiration, fall    Limitations/Impairments: hearing   Services  Do You Speak a Language Other Than English at Home?: no      OT Eval and Treat - 12/04/23 1030        Cognition    Orientation Status oriented x 3     Affect/Mental Status WFL     Follows Commands WFL     Cognitive Function WFL     Comment, Cognition Pt aware of deficits and need for assistance, and is receptive to education provided t/o session        Bed Mobility    Bed Mobility Activities supine to sit     [place] supervision     Safety/Cues increased time to complete     Assistive Device head of bed elevated;bed rails     Comment HOB elevated to ~30 degrees, OOB to L side. Physical assist not required        Sit/Stand Transfer    Surface edge of bed     [place] minimum assist (75% or more patient effort);1 person assist     Safety/Cues increased time to complete;verbal cues;hand placement     Assistive Device none     Transfer Comments Goldie for buttock  offload/steadying, vc's provided for initiation and technique        Surface-to-Surface Transfers    Transfer Location bed to chair     Transfer Technique stand step     [place] minimum assist (75% or more patient effort);1 person assist     Safety/Cues increased time to complete;verbal cues     Assistive Device none     Transfer Comments Robbi/HHA for steadying, vc's provided for initation and sequencing        Functional Mobility    Distance in room/bathroom     Functional Mobility [place] minimum assist (75% or more patient effort);1 person assist     Safety/Cues increased time to complete;verbal cues;sequencing;technique     Assistive Device walker, front-wheeled     Functional Mobility Comments Pt completed bedroom<>bathroom functional mobility with Robbi and use of RW for steadying, vc's provided for posture d/t kyphotic posture and RW management. Pt with intermittent posterior lean during mobility        Upper Body Dressing    Tasks don;doff;hospital gown     [place] set up     Safety/Cues increased time to complete;verbal cues;sequencing     Position supported sitting     Adaptive Equipment none     Comment At chair level        Lower Body Dressing    Tasks don;doff;socks     [place] other (see comments)     Safety/Cues increased time to complete;verbal cues;sequencing     Position supported sitting     Adaptive Equipment none     Comment At chair level. Pt able to doff b/l socks w/o assist and with increased time (S). Pt able to don L sock, however required assist donning R sock d/t decr flexibility in R hip (modA)        Grooming    Tasks washes, rinses and dries face;oral care (brushing teeth, cleaning dentures)     [place] minimum assist (75% or more patient effort);1 person assist     Safety/Cues increased time to complete;verbal cues;sequencing     Position supported standing     Setup Assistance obtain supplies;open containers     Adaptive Equipment none     Comment Pt completed  standing sinkside with Robbi for steadying and use of RW. Pt cued to widden WILMAR and for reaching techniques to incr safety        Toileting    Comment Pt +ostomy, reports 24hr aide assists in toileting        Balance    Static Sitting Balance sitting, edge of bed;WFL     Dynamic Sitting Balance sitting in chair;WFL     Sit to Stand Dynamic Balance mild impairment;supported     Static Standing Balance mild impairment;supported     Dynamic Standing Balance mild impairment;supported     Balance Interventions occupation based/functional task     Comment, Balance Robbi with RW        Impairments/Safety Issues    Impairments Affecting Function balance;endurance/activity tolerance;strength     Functional Endurance Fair               Session Outcome  Patient upright, in chair at end of session, chair alarm on, all needs met, call light in reach, personal items in reach. Nursing notified about change in vital signs, patient's performance, patient's position, and patient's response to therapy/activity.    AM-PAC™ - ADL (Current Function)     Putting on/taking off regular lower body clothing 3 - A Little   Bathing 3 - A Little   Toileting 3 - A Little   Putting on/taking off regular upper body clothing 3 - A Little   Help for taking care of personal grooming 3 - A Little   Eating meals 4 - None   AM-PAC™ ADL Score 19      ASSESSMENT AND PLAN     Progress Summary  OT tx completed. Pt required S for bed mobility with use of bed features. Pt completed STS tsf, bed to chair mobility, bedroom<>bathroom functional mobility, and grooming standing sinkside with Robbi and use of RW. Pt required modA with LB dressing and S for UB dressing in sitting. Pt completed S during functional mobility last session and required Robbi during session today, anticiapte d/t lack of OOB mobility. Pt educated on importance of OOB mobility with staff while inpatient to prevent weakess and to increase endurance. OT goals revised to appropriate level of  assistance that pt required prior to session. Pt will benefit from cont skilled OT while inpatient, rec return home with 24hr aide and HHOT/HHPT    Patient/Family Therapy Goal Statement: To return home    OT Plan    Flowsheet Row Most Recent Value   Rehab Potential good, to achieve stated therapy goals at 12/04/2023 1030   Therapy Frequency 3 times/wk at 12/04/2023 1030   Planned Therapy Interventions activity tolerance training, neuromuscular control/coordination retraining, patient/caregiver education/training, transfer/mobility retraining, adaptive equipment training, functional balance retraining, occupation/activity based interventions, BADL retraining, ROM/therapeutic exercise, cognitive retraining, passive ROM/stretching, strengthening exercise at 12/04/2023 1030        OT Discharge Recommendations    Flowsheet Row Most Recent Value   OT Recommended Discharge Disposition home with assistance, home with home health at 12/04/2023 1030   Anticipated Equipment Needs if Discharged Home (OT) none at 12/04/2023 1030        OT Goals    Flowsheet Row Most Recent Value   Bed Mobility Goal 1    Activity/Assistive Device bed mobility activities, all at 12/01/2023 0852   Lamar modified independence at 12/01/2023 0852   Time Frame by discharge at 12/01/2023 0852   Progress/Outcome goal ongoing at 12/04/2023 1030   Transfer Goal 1    Activity/Assistive Device all transfers at 12/04/2023 1030   [place] supervision required at 12/04/2023 1030   Time Frame by discharge at 12/04/2023 1030   Progress/Outcome goal revised this date at 12/04/2023 1030   Dressing Goal 1    Activity/Adaptive Equipment dressing skills, all at 12/04/2023 1030   [place] minimum assist (75% or more patient effort) at 12/04/2023 1030   Time Frame by discharge at 12/04/2023 1030   Progress/Outcome goal revised this date at 12/04/2023 1030

## 2023-12-04 NOTE — PROGRESS NOTES
Patient:  Samy Elena  Location:  [place] [address]  MRN:  [id number]  Today's date:  12/4/2023    SLP Diagnosis  Oral and pharyngeal phases appear functional, no s/sx aspiration with intake, pt reports frequent emesis following meals    Swallowing Recommendations  Diet Consistency easy to chew (EC7), thin liquids     Medication Administration crushed with pureed   Supervision Level distant supervision needed   Feeding Recommendations allow patient to feed self if maintaining safety, allow extra time for meals, stop meal if showing signs of aspiration or fatigue (e.g., coughing, wet voice)   Posture Recommendations fully upright in chair/chair mode of bed   Swallowing Strategies alternate food and liquid intake   Instrumental Assessment Recommendations reassess via non-instrumental clinical swallow evaluation.     Comments Reverse aspiration/GERD precautions, additional sauces/gravies with meals, SINGLE sips of thin liquids.     Summary/Handoff  Pt seen for f/u dysphagia therapy. He denies any oropharyngeal swallowing difficulties, but reports that he has been having frequent emesis following solid intake with meals. PO trials of thin liquids via straw and regular solids were presented. Oral and pharyngeal phases appeared functional at bedside, without any overt s/sx aspiration or difficulty with intake. Following trials, pt developed a wet/bubbly vocal quality without emesis noted. Suspect some level of esophageal dysphagia given hx of GERD, GI bleed, and reported emesis following meals. Recommend GI consult for further assessment and recommendations. Recommend continuing an easy to chew solid and thin liquid diet with additional sauces/gravies as able with meals, reverse aspiration/GERD precautions, and medications as tolerated. ST to follow for diet tolerance and pending GI recommendations.    Active Diet Orders (From admission, onward)     Start     Ordered    12/01/23 1603  Adult  Diet Easy to Chew EC7; Thin Liquids; 2000 mL Fluid; Cardiac (Low Sodium/Low Fat); RD/LDN may adjust order  Diet effective now        Question Answer Comment   Diet Texture Easy to Chew EC7    Fluid Consistency: Thin Liquids    Fluid restriction dietary / 24h: 2000 mL Fluid    Other Restriction(s): Cardiac (Low Sodium/Low Fat)    Delegation of Authority. Diet orders written by PA/CRNPs may not be adjusted by RD/LDNs. RD/LDN may adjust order        12/01/23 1603                Samy is a 89 y.o. male admitted on 11/29/2023 with Pleural effusion on left [J90]  Chest pain, unspecified type [R07.9]  Acute congestive heart failure, unspecified heart failure type (CMS/HCC) [I50.9]. Principal problem is Pleural effusion on left.    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    History of Present Illness   Pleural effusion, UTI, s/p thoracentesis      SLP Pain    Date/Time Pain Type Rating: Rest Rating: Activity [facility]   12/04/23 1109 Pain Assessment 0 - no pain 0 - no pain CK          Prior Living Environment    Flowsheet Row Most Recent Value   People in Home alone  [w 24/7 A]   Current Living Arrangements home   Home Accessibility ramp to enter home   Living Environment Comment pt lives in 2SH, ramp to enter, first floor set up        Prior Level of Function    Flowsheet Row Most Recent Value   Dominant Hand right   Ambulation assistive equipment   Transferring assistive equipment   Toileting assistive person   Bathing assistive person   Dressing assistive person   Eating independent   IADLs assistive person   Communication understands/communicates  without difficulty   Swallowing difficulty swallowing foods   Baseline Diet/Method of Nutritional Intake regular, thin liquids   Past History of Dysphagia Pt reports difficulty swallowing tougher solids at baseline. Hx of GERD, prior GI bleed, and diverticulitis s/p colostomy. Prefers to consume softer, regular solids at baseline. VFSS completed 1/5/23 with recommendations for a soft & bite-sized solid and mildly thick liquid diet at that time due to aspiration of thin liquids via straw sips. Pt with a positive sensory response which was mostly effective at clearing aspiration from the airway at that time.   Prior Level of Function Comment Pt was independent with eating/drinking prior to admission.   Assistive Device Currently Used at Home wheelchair, scale, walker, standard           SLP Evaluation and Treatment - 12/04/23 1109        SLP Time Calculation    Start Time 1109     Stop Time 1126     Time Calculation (min) 17 min        General Information    Document Type Daily Treatment/Progress Note     Mode of Treatment speech language pathology     Position at Start of Session upright;in chair     Status at Start of Session agreeable to therapy     General Observations of Patient RN cleared pt for tx        Precautions/Limitations/Impairments    Existing Precautions/Restrictions aspiration;fall        Cognition/Psychosocial    Comment, Cognition Pt awake/alert and oriented x4. Able to provide detailed medical/dysphagia hx re: current symptoms.        GRBAS    Grade 1-->slight abnormality     Roughness 0-->none     Breathiness 0-->none     Asthenia 1-->slight abnormality     Strain 0-->none        Functional Communication Measures    FCM: Swallowing 6-->Level 6        General Swallowing Observations    Current Diet/Method of Nutritional Intake easy to chew (EC7);thin liquids     Signs/Symptoms of Aspiration (Current Diet) wet/gurgly vocal quality     Respiratory Support (General Swallowing Observations) none      Comment, Secretions/Suctioning WFL        Food and Liquid Trials (NIS)    Patient Positioning HOB elevated (specify degrees)     Oral Intake/Feeding Performance independent/appropriate self-feeding skills     Liquid Consistencies Evaluated thin liquids     Thin Liquids WFL;intact     Food Consistencies Evaluated regular     Pureed (PU4) WFL;intact     Regular intact;patient controlled amounts     Oral Preparatory Phase of Swallow WFL     Oral Phase of Swallow WFL     Comment, Oral Phase Functional oral phase across solids and liquids.     Pharyngeal Phase of Swallow WNL;no clinical symptoms     Comment, Pharyngeal Phase No overt s/sx aspiration with intake of liquids or solids.     Esophageal Phase of Swallow other (see comments)   No signs of belching or vomiting noted with today's trials, however pt reports that he continues to have emesis with each meal following intake of solids.    Comment stable on room air        Swallowing Recommendations    Diet Consistency Recommendations easy to chew (EC7);thin liquids     Medication Administration crushed with pureed     Supervision Level for Intake distant supervision needed     Feeding/Delivery Recommendations allow patient to feed self if maintaining safety;allow extra time for meals;stop meal if showing signs of aspiration or fatigue (e.g., coughing, wet voice)     Posture Recommendations fully upright in chair/chair mode of bed     Swallowing Strategies alternate food and liquid intake     Instrumental Assessment Recommendations reassess via non-instrumental clinical swallow evaluation     Comment, Swallowing Recommendations Reverse aspiration/GERD precautions, additional sauces/gravies with meals, SINGLE sips of thin liquids.        Swallowing Intervention    Dysphagia/Swallowing Interventions monitor tolerance of;current diet without evidence of aspiration        AM-PAC™ - Cognition (Current Function)    Following/understanding a 10-15 minute speech or  presentation? 4 - None     Understanding familiar people during ordinary conversations? 4 - None     Remembering to take medications at the appropriate time? 3 - A little     Remembering where things were placed or put away? 3 - A little     Remembering a list of 3 or 4 errands without writing it down? 3 - A little     Taking care of complicated tasks? 3 - A little     AM-PAC™ Cognition Score 20        Session Outcome    Position at End of Session upright;in chair     Status at End of Session chair alarm on;all needs met     Nursing Notified patient's performance;patient's response to therapy/activity        Plan    Rehab Potential good, to achieve stated therapy goals     Therapy Frequency 3 times/wk     Planned Therapy Interventions dysphagia therapy                        Education Documentation  Diet Modification, taught by Elva Wen CCC-SLP at 12/4/2023 11:54 AM.  Learner: Patient  Readiness: Acceptance  Method: Explanation  Response: Verbalizes Understanding    Signs/Symptoms, taught by Elva Wen CCC-SLP at 12/4/2023 11:54 AM.  Learner: Patient  Readiness: Acceptance  Method: Explanation  Response: Verbalizes Understanding          SLP Goals    Flowsheet Row Most Recent Value   Oral Nutrition/Hydration Goal 1    Activity effective/safe/independent at 12/01/2023 1440   Time Frame short-term goal (STG) at 12/01/2023 1440   Progress/Outcome good progress toward goal at 12/04/2023 1109

## 2023-12-05 ENCOUNTER — APPOINTMENT (INPATIENT)
Dept: RADIOLOGY | Facility: HOSPITAL | Age: 88
DRG: 291 | End: 2023-12-05
Attending: HOSPITALIST
Payer: MEDICARE

## 2023-12-05 LAB
ANION GAP SERPL CALC-SCNC: 8 MEQ/L (ref 3–15)
BASOPHILS # BLD: 0.04 K/UL (ref 0.01–0.1)
BASOPHILS NFR BLD: 0.6 %
BUN SERPL-MCNC: 36 MG/DL (ref 7–25)
CALCIUM SERPL-MCNC: 8.6 MG/DL (ref 8.6–10.3)
CHLORIDE SERPL-SCNC: 102 MEQ/L (ref 98–107)
CO2 SERPL-SCNC: 32 MEQ/L (ref 21–31)
CREAT SERPL-MCNC: 1.6 MG/DL (ref 0.7–1.3)
DIFFERENTIAL METHOD BLD: ABNORMAL
EGFRCR SERPLBLD CKD-EPI 2021: 40.2 ML/MIN/1.73M*2
EOSINOPHIL # BLD: 0.2 K/UL (ref 0.04–0.54)
EOSINOPHIL NFR BLD: 3.2 %
ERYTHROCYTE [DISTWIDTH] IN BLOOD BY AUTOMATED COUNT: 16.8 % (ref 11.6–14.4)
GLUCOSE SERPL-MCNC: 89 MG/DL (ref 70–99)
HCT VFR BLDCO AUTO: 33.8 % (ref 40.1–51)
HGB BLD-MCNC: 10.7 G/DL (ref 13.7–17.5)
IMM GRANULOCYTES # BLD AUTO: 0.02 K/UL (ref 0–0.08)
IMM GRANULOCYTES NFR BLD AUTO: 0.3 %
LYMPHOCYTES # BLD: 0.91 K/UL (ref 1.2–3.5)
LYMPHOCYTES NFR BLD: 14.7 %
MAGNESIUM SERPL-MCNC: 2 MG/DL (ref 1.8–2.5)
MCH RBC QN AUTO: 31.5 PG (ref 28–33.2)
MCHC RBC AUTO-ENTMCNC: 31.7 G/DL (ref 32.2–36.5)
MCV RBC AUTO: 99.4 FL (ref 83–98)
MONOCYTES # BLD: 0.68 K/UL (ref 0.3–1)
MONOCYTES NFR BLD: 11 %
NEUTROPHILS # BLD: 4.33 K/UL (ref 1.7–7)
NEUTS SEG NFR BLD: 70.2 %
NRBC BLD-RTO: 0 %
PDW BLD AUTO: 10.9 FL (ref 9.4–12.4)
PLATELET # BLD AUTO: 155 K/UL (ref 150–350)
POTASSIUM SERPL-SCNC: 3.7 MEQ/L (ref 3.5–5.1)
QRS DURATION: 142
QT INTERVAL: 468
QTC CALCULATION(BAZETT): 533
R AXIS: 86
RBC # BLD AUTO: 3.4 M/UL (ref 4.5–5.8)
SODIUM SERPL-SCNC: 142 MEQ/L (ref 136–145)
T WAVE AXIS: -22
VENTRICULAR RATE: 78
WBC # BLD AUTO: 6.18 K/UL (ref 3.8–10.5)

## 2023-12-05 PROCEDURE — 63700000 HC SELF-ADMINISTRABLE DRUG: Performed by: INTERNAL MEDICINE

## 2023-12-05 PROCEDURE — 93005 ELECTROCARDIOGRAM TRACING: CPT | Performed by: HOSPITALIST

## 2023-12-05 PROCEDURE — 83735 ASSAY OF MAGNESIUM: CPT | Performed by: HOSPITALIST

## 2023-12-05 PROCEDURE — 85025 COMPLETE CBC W/AUTO DIFF WBC: CPT | Performed by: HOSPITALIST

## 2023-12-05 PROCEDURE — 99232 SBSQ HOSP IP/OBS MODERATE 35: CPT | Performed by: STUDENT IN AN ORGANIZED HEALTH CARE EDUCATION/TRAINING PROGRAM

## 2023-12-05 PROCEDURE — 63700000 HC SELF-ADMINISTRABLE DRUG: Performed by: HOSPITALIST

## 2023-12-05 PROCEDURE — 93010 ELECTROCARDIOGRAM REPORT: CPT | Performed by: INTERNAL MEDICINE

## 2023-12-05 PROCEDURE — 21400000 HC ROOM AND CARE CCU/INTERMEDIATE

## 2023-12-05 PROCEDURE — 25800000 HC PHARMACY IV SOLUTIONS: Performed by: INTERNAL MEDICINE

## 2023-12-05 PROCEDURE — 63700000 HC SELF-ADMINISTRABLE DRUG

## 2023-12-05 PROCEDURE — 92526 ORAL FUNCTION THERAPY: CPT | Mod: GN

## 2023-12-05 PROCEDURE — 74018 RADEX ABDOMEN 1 VIEW: CPT

## 2023-12-05 PROCEDURE — 36415 COLL VENOUS BLD VENIPUNCTURE: CPT | Performed by: HOSPITALIST

## 2023-12-05 PROCEDURE — 80048 BASIC METABOLIC PNL TOTAL CA: CPT | Performed by: HOSPITALIST

## 2023-12-05 PROCEDURE — 63600000 HC DRUGS/DETAIL CODE: Mod: JZ | Performed by: INTERNAL MEDICINE

## 2023-12-05 PROCEDURE — 63600000 HC DRUGS/DETAIL CODE: Mod: JZ

## 2023-12-05 RX ORDER — FUROSEMIDE 40 MG/1
40 TABLET ORAL
Status: CANCELLED | OUTPATIENT
Start: 2023-12-05

## 2023-12-05 RX ADMIN — CYANOCOBALAMIN TAB 1000 MCG 500 MCG: 1000 TAB at 09:49

## 2023-12-05 RX ADMIN — LOTEPREDNOL ETABONATE 1 DROP: 5 SUSPENSION/ DROPS OPHTHALMIC at 16:35

## 2023-12-05 RX ADMIN — CEFTRIAXONE SODIUM 1 G: 1 INJECTION, POWDER, FOR SOLUTION INTRAMUSCULAR; INTRAVENOUS at 09:48

## 2023-12-05 RX ADMIN — LOTEPREDNOL ETABONATE 1 DROP: 5 SUSPENSION/ DROPS OPHTHALMIC at 20:44

## 2023-12-05 RX ADMIN — THIAMINE HCL TAB 100 MG 100 MG: 100 TAB at 09:49

## 2023-12-05 RX ADMIN — MAGNESIUM OXIDE TAB 400 MG (241.3 MG ELEMENTAL MG) 400 MG: 400 (241.3 MG) TAB at 09:53

## 2023-12-05 RX ADMIN — TOBRAMYCIN 1 DROP: 3 SOLUTION OPHTHALMIC at 09:57

## 2023-12-05 RX ADMIN — LEVOTHYROXINE SODIUM 112 MCG: 0.11 TABLET ORAL at 05:24

## 2023-12-05 RX ADMIN — Medication 3 MG: at 20:33

## 2023-12-05 RX ADMIN — Medication 500 MG: at 09:49

## 2023-12-05 RX ADMIN — FAMOTIDINE 10 MG: 10 TABLET ORAL at 09:52

## 2023-12-05 RX ADMIN — MULTIPLE VITAMINS W/ MINERALS TAB 1 TABLET: TAB at 09:48

## 2023-12-05 RX ADMIN — LOTEPREDNOL ETABONATE 1 DROP: 5 SUSPENSION/ DROPS OPHTHALMIC at 09:56

## 2023-12-05 RX ADMIN — FUROSEMIDE 40 MG: 10 INJECTION, SOLUTION INTRAMUSCULAR; INTRAVENOUS at 16:36

## 2023-12-05 RX ADMIN — CLOPIDOGREL 75 MG: 75 TABLET ORAL at 09:49

## 2023-12-05 RX ADMIN — ANTACID TABLETS 500 MG: 500 TABLET, CHEWABLE ORAL at 09:49

## 2023-12-05 RX ADMIN — FUROSEMIDE 40 MG: 10 INJECTION, SOLUTION INTRAMUSCULAR; INTRAVENOUS at 09:53

## 2023-12-05 RX ADMIN — POLYETHYLENE GLYCOL 3350 17 G: 17 POWDER, FOR SOLUTION ORAL at 09:53

## 2023-12-05 RX ADMIN — METOPROLOL TARTRATE 6.25 MG: 25 TABLET, FILM COATED ORAL at 09:49

## 2023-12-05 RX ADMIN — PANTOPRAZOLE SODIUM 20 MG: 20 TABLET, DELAYED RELEASE ORAL at 09:49

## 2023-12-05 RX ADMIN — LATANOPROST 1 DROP: 50 SOLUTION/ DROPS OPHTHALMIC at 20:34

## 2023-12-05 RX ADMIN — METOPROLOL TARTRATE 6.25 MG: 25 TABLET, FILM COATED ORAL at 20:33

## 2023-12-05 RX ADMIN — MAGNESIUM OXIDE TAB 400 MG (241.3 MG ELEMENTAL MG) 400 MG: 400 (241.3 MG) TAB at 20:33

## 2023-12-05 RX ADMIN — ANTACID TABLETS 500 MG: 500 TABLET, CHEWABLE ORAL at 20:33

## 2023-12-05 ASSESSMENT — COGNITIVE AND FUNCTIONAL STATUS - GENERAL
TAKING CARE OF COMPLICATED TASKS: 3 - A LITTLE
CLIMB 3 TO 5 STEPS WITH RAILING: 3 - A LITTLE
UNDERSTANDING 10 TO 15 MIN SPEECH: 4 - NONE
MOVING TO AND FROM BED TO CHAIR: 3 - A LITTLE
FOLLOWS FAMILIAR CONVERSATION: 4 - NONE
REMEMBERING 5 ERRANDS WITH NO LIST: 3 - A LITTLE
REMEMBERING WHERE THINGS ARE: 3 - A LITTLE
REMEMBERING TO TAKE MEDICATION: 3 - A LITTLE
STANDING UP FROM CHAIR USING ARMS: 3 - A LITTLE
WALKING IN HOSPITAL ROOM: 3 - A LITTLE

## 2023-12-05 NOTE — PROGRESS NOTES
"    Hospital Medicine Service -  Daily Progress Note       SUBJECTIVE   Interval History: No acute events overnight.     Patient states, \"I feel much better\" He tolerated many pills and breakfast though he did throw up last two pills which he attributed to feeling full.    OBJECTIVE      Vital signs in last 24 hours:  Temp:  [36.2 °C (97.2 °F)-36.9 °C (98.4 °F)] 36.2 °C (97.2 °F)  Heart Rate:  [] 60  Resp:  [16] 16  BP: ()/(53-62) 93/54    Intake/Output Summary (Last 24 hours) at 12/5/2023 1607  Last data filed at 12/5/2023 0800  Gross per 24 hour   Intake 800 ml   Output 200 ml   Net 600 ml     Weights (last 7 days)     Date/Time Weight    12/05/23 0533 68 kg (149 lb 14.4 oz)    12/04/23 0406 68.4 kg (150 lb 12.8 oz)    12/03/23 0459 68.7 kg (151 lb 6.4 oz)    12/02/23 0500 68.3 kg (150 lb 8 oz)    12/01/23 0335 73.3 kg (161 lb 8 oz)    11/29/23 23:03:36 74.4 kg (164 lb)         PHYSICAL EXAMINATION        General: Awake, alert. No acute distress. Elderly but mentally sharp, appears stated age or slightly younger. Body mass index is 19.78 kg/m².  HEENT: PERRL/EOMI; Sclerae anicteric. Moist mucous membranes.   Lungs: Decreased breath sounds on left side, bilateral lower base crackles  Cardiovascular: Regular rate and rhythm. + systolic murmur   Abdomen: Soft, non tender, non distended. + Ostomy bag with brown stool  Extremities:trace to minimal gisele bilaterally.  Neuro: No focal deficits  Psych: AAOx3; full range affect   LINES, CATHETERS, DRAINS, AIRWAYS, AND WOUNDS   Lines, Drains, and Airways:  Wounds (agree with documentation and present on admission):     LABS / IMAGING / TELE      Labs reviewed.    Imaging  No results found for this or any previous visit.    X-RAY ABDOMEN 1 VIEW  Narrative: CLINICAL HISTORY: Coughing and emesis. Assess stool burden.    COMMENT:  2 supine AP views of the abdomen are performed.    Comparison: Abdominal CTA performed 8/3/2023    There is a nonobstructive bowel gas " pattern. Evaluation for free intraperitoneal  air is limited by supine positioning. Significant stool is present overlying the  left-sided ostomy. Surgical clips are present in the right upper quadrant. There  are numerous calcifications in the left upper quadrant which correspond to  splenic arterial calcifications. Significant degenerative changes are present in  the regional osseous structures and the bones are diffusely demineralized.  Impression: IMPRESSION:  Nonobstructive bowel gas pattern. Significant stool overlying the left-sided  ostomy.      ECG/Telemetry  Reviewed     ASSESSMENT AND PLAN      UTI (urinary tract infection)  Assessment & Plan  Diagnosed outpatient, on ciprofloxacin 500 mg twice daily prescribed on 11/28 for 7-day course  -Will order ceftriaxone on admission  to complete course, last day 12/5.     Acute on chronic systolic (congestive) heart failure (CMS/HCC)  Assessment & Plan  Follows with Dr. Jeffrey cardiology  -CABG in 1996; adenosine MPI negative for ischemia in 6/13  -Maintained on Plavix and torsemide 10 mg daily with as needed doses available for weight gain  -TTE (1/2023): 40-45%; ASM; severely dilated RV; severe TR; large left pleural effusion; RVSP calculated at 31 mmHg which is likely underestimated; IVC is very dilated at 5 cm  -Admit with CHF order set  -Home diuretic torsemide 10 mg, reduced from previous dose of 20 mg  -Hold home diuretic and continue with 40 mg IV Lasix twice daily  -Monitor for hypotension and pull back from IV diuresis as needed  -Cards consulted, follow recommendations, switch to PO tomorrow (PTA torsemide)  ANTICIPATE DC Home , home health aide arrangement for 12/7 AM on discussion with son by phone, who would like update again 12/6pm        * Pleural effusion on left  Assessment & Plan  -CXR showed cardiomegaly and pulmonary edema. Large left pleural effusion.  -Notably not hypoxic in the ED, saturating 96% on room air. Symptomatic with cough     -Exudative by Light's criteria on prior admission in August. Repeat fluid ounces this admission again with exudative fluid  -Underwent thoracentesis x 2 (1.2 L, 900 cc), -  pleural fluid studies noted:    Pleural protein/serum protein 3.9/7.5 = 0.52; serum LDH not performed  -Likely in the setting of CHF exacerbation  -Continue IV diuresis    Cough  Assessment & Plan  Suspected volume overload> aspiration,   No s/s PNA (also checked CXR repeat over the weekend),  SLP input was to consult GI,     12/04/23 this morning he had a long coughing and emesis spell following taking 2 pills, after some time he was able to tolerate a small spoonful of applesauce.     Appreciate GI input. Suspect stool burden. KUB shows stool burden without obstruction, completed 12/05/23,   pepcid, miralax, zofran prn (all ordered except tigan for latter given current qtc >500).   SLP consult in place    Atrial fibrillation (CMS/HCC)  Assessment & Plan  Follows with Dr. Jeffrey cardiology OV 9/5/23  - Not on anticoagulation due to Hx of rectal bleeding, followed by GI at [facility]  - He considered NISH closure device and has decided to not pursue  -Rate controlled A-fib on admission, asymptomatic  -Continue home metoprolol succinate 25 mg, consistent fill history    Ischemic congestive cardiomyopathy (CMS/[facility])  Assessment & Plan  Follows with Dr. Jeffrey cardiology  -CABG in 1996; adenosine MPI negative for ischemia in 6/13  -Maintained on Plavix and torsemide 10 mg daily with as needed doses available for weight gain  -TTE (1/2023): 40-45%; ASM; severely dilated RV; severe TR; large left pleural effusion; RVSP calculated at 31 mmHg which is likely underestimated; IVC is very dilated at 5 cm  12/04/23 appreciate Cards recs! -rec to add metop/valsartan low dose (very low doses tentatively ordered but BP soft, holding valsartan to see AM BP first)    Depression  Assessment & Plan  Continue home escitalopram and mirtazapine (former had been held,  resumed now_    Hypothyroidism  Assessment & Plan  -Repeat TFT with TSH 0.02 and T4 1.89  -Home dose Levothyroxine 137mcg  -Will decrease to 112mcg  -Will need outpatient follow-up for repeat labs in 4 to 6 weeks    Hyperlipidemia  Assessment & Plan  Previously had been on atorvastatin 40 mg, last dispense for 30 tablets in 12/2022  -Hold on admission seeing likely not taking  -Check lipid panel for completeness in the morning    Pressure ulcer  Assessment & Plan  Sacrum pressure injury stage 2, present on admission.       Addendum  - CKD3.   Sacrum pressure injury stage 2, present on admission.      VTE Assessment: [place]    VTE Prophylaxis:  Current anticoagulants:    •None    SCDs  Code Status: DNR (A.N.D.)      Estimated Discharge Date: 12/6/2023     Disposition Planning: Pending improved clinical status and continued IV diuresis, after monitoring on PO diuresis, arrange for 12/7 AM.      Crystal Alonso, DO  12/5/2023   50 minutes were spent with patient and or family members collecting an (interval) history, performing a pertinent physical exam and coordinating care and decision making with other healthcare providers and specialists in this complex case.

## 2023-12-05 NOTE — PROGRESS NOTES
Patient:  Samy Elena  Location:  [place] [address]  MRN:  [id number]  Today's date:  12/5/2023    SLP Diagnosis  Oral and pharyngeal phases appear functional, no overt signs or symptoms of aspiration    Swallowing Recommendations  Diet Consistency easy to chew (EC7), thin liquids     Medication Administration crushed with pureed   Supervision Level distant supervision needed   Feeding Recommendations allow patient to feed self if maintaining safety, allow extra time for meals   Posture Recommendations fully upright in chair/chair mode of bed   Swallowing Strategies alternate food and liquid intake, extra swallow   Instrumental Assessment Recommendations instrumental evaluation not recommended at this time.     Comments Continue reverse aspiration/GERD precautions, additional sauces/gravies with solids, SINGLE sips of thin liquids     Summary/Handoff  Patient seen for follow-up dysphagia therapy.  He notes improvement with intake of meals and reports that he did not have any episodes of emesis following any meals today.  He feels that the MiraLAX is improving overall intake and reducing nausea/vomiting.  P.o. trials of regular solids and thin liquids via straw were presented.  Oral and pharyngeal phases of swallow function appeared normal without any signs or symptoms of swallowing difficulty or aspiration.  Recommend continuing an easy to chew diet as this appears to be the safest and least restrictive given history of esophageal dysphagia.  ST to sign off at this time as skilled intervention is no longer warranted.  Please reconsult speech therapy if any new signs or symptoms of aspiration are observed.    Active Diet Orders (From admission, onward)     Start     Ordered    12/01/23 1603  Adult Diet Easy to Chew EC7; Thin Liquids; 2000 mL Fluid; Cardiac (Low Sodium/Low Fat); RD/LDN may adjust order  Diet effective now        Question Answer Comment   Diet Texture Easy to Chew EC7     Fluid Consistency: Thin Liquids    Fluid restriction dietary / 24h: 2000 mL Fluid    Other Restriction(s): Cardiac (Low Sodium/Low Fat)    Delegation of Authority. Diet orders written by PA/CRNPs may not be adjusted by RD/LDNs. RD/LDN may adjust order        12/01/23 1603                Samy is a 89 y.o. male admitted on 11/29/2023 with Pleural effusion on left [J90]  Chest pain, unspecified type [R07.9]  Acute congestive heart failure, unspecified heart failure type (CMS/HCC) [I50.9]. Principal problem is Pleural effusion on left.    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    History of Present Illness   Pleural effusion, UTI, s/p thoracentesis      SLP Pain    Date/Time Pain Type Rating: Rest Rating: Activity [facility]   12/05/23 0956 Pain Assessment 0 - no pain 0 - no pain CK          Prior Living Environment    Flowsheet Row Most Recent Value   People in Home alone  [w 24/7 A]   Current Living Arrangements home   Home Accessibility ramp to enter home   Living Environment Comment pt lives in 2SH, ramp to enter, first floor set up        Prior Level of Function    Flowsheet Row Most Recent Value   Dominant Hand right   Ambulation assistive equipment   Transferring assistive equipment   Toileting assistive person   Bathing assistive person   Dressing assistive person   Eating independent   IADLs assistive person   Communication understands/communicates without difficulty   Swallowing difficulty swallowing foods   Baseline Diet/Method of Nutritional Intake regular, thin liquids   Past History of Dysphagia Pt reports difficulty swallowing  tougher solids at baseline. Hx of GERD, prior GI bleed, and diverticulitis s/p colostomy. Prefers to consume softer, regular solids at baseline. VFSS completed 1/5/23 with recommendations for a soft & bite-sized solid and mildly thick liquid diet at that time due to aspiration of thin liquids via straw sips. Pt with a positive sensory response which was mostly effective at clearing aspiration from the airway at that time.   Prior Level of Function Comment Pt was independent with eating/drinking prior to admission.   Assistive Device Currently Used at Home wheelchair, scale, walker, standard           SLP Evaluation and Treatment - 12/05/23 0956        SLP Time Calculation    Start Time 0956     Stop Time 1014     Time Calculation (min) 18 min        General Information    Document Type Daily Treatment/Progress Note     Mode of Treatment speech language pathology     Position at Start of Session upright;in bed     Status at Start of Session agreeable to therapy     General Observations of Patient And cleared patient to be seen for follow-up dysphagia therapy.        Precautions/Limitations/Impairments    Existing Precautions/Restrictions aspiration;fall     Limitations/Impairments hearing        Cognition/Psychosocial    Comment, Cognition Pleasant and cooperative.  Oriented x 4.        GRBAS    Grade 0-->none     Roughness 0-->none     Breathiness 0-->none     Asthenia 0-->none     Strain 0-->none        Functional Communication Measures    FCM: Swallowing 6-->Level 6        General Swallowing Observations    Current Diet/Method of Nutritional Intake easy to chew (EC7);thin liquids     Signs/Symptoms of Aspiration (Current Diet) none     Respiratory Support (General Swallowing Observations) none     Comment, Secretions/Suctioning WFL        Food and Liquid Trials (NIS)    Patient Positioning HOB elevated (specify degrees)     Oral Intake/Feeding Performance independent/appropriate self-feeding skills     Liquid  Consistencies Evaluated thin liquids     Thin Liquids WFL;intact     Food Consistencies Evaluated regular     Regular WFL;intact     Oral Preparatory Phase of Swallow WFL     Oral Phase of Swallow WFL     Comment, Oral Phase Functional oral phase across solids and liquids.     Pharyngeal Phase of Swallow WNL;no clinical symptoms     Comment, Pharyngeal Phase No overt signs or symptoms of aspiration     Esophageal Phase of Swallow WNL;no clinical symptoms        Swallowing Recommendations    Diet Consistency Recommendations easy to chew (EC7);thin liquids     Medication Administration crushed with pureed     Supervision Level for Intake distant supervision needed     Feeding/Delivery Recommendations allow patient to feed self if maintaining safety;allow extra time for meals     Posture Recommendations fully upright in chair/chair mode of bed     Swallowing Strategies alternate food and liquid intake;extra swallow     Instrumental Assessment Recommendations instrumental evaluation not recommended at this time     Comment, Swallowing Recommendations Continue reverse aspiration/GERD precautions, additional sauces/gravies with solids, SINGLE sips of thin liquids        Swallowing Intervention    Dysphagia/Swallowing Interventions monitor tolerance of;current diet without evidence of aspiration        AM-PAC™ - Cognition (Current Function)    Following/understanding a 10-15 minute speech or presentation? 4 - None     Understanding familiar people during ordinary conversations? 4 - None     Remembering to take medications at the appropriate time? 3 - A little     Remembering where things were placed or put away? 3 - A little     Remembering a list of 3 or 4 errands without writing it down? 3 - A little     Taking care of complicated tasks? 3 - A little     AM-PAC™ Cognition Score 20        Session Outcome    Position at End of Session upright;in bed     Status at End of Session bed alarm on;all needs met;call light in  reach     Nursing Notified patient's performance;patient's response to therapy/activity        Plan    Rehab Potential good, to achieve stated therapy goals     Therapy Frequency 3 times/wk     Planned Therapy Interventions dysphagia therapy        Discharge Summary    Reason for Discharge (SLP) patient met all goals and outcomes;no further needs identified                        Education Documentation  Diet Modification, taught by Elva Wen CCC-SLP at 12/5/2023  3:47 PM.  Learner: Patient  Readiness: Acceptance  Method: Explanation  Response: Verbalizes Understanding  Comment: Rationale of diet recommendations, aspiration precautions    Signs/Symptoms, taught by Elva Wen CCC-SLP at 12/5/2023  3:47 PM.  Learner: Patient  Readiness: Acceptance  Method: Explanation  Response: Verbalizes Understanding  Comment: Rationale of diet recommendations, aspiration precautions          SLP Goals    Flowsheet Row Most Recent Value   Oral Nutrition/Hydration Goal 1    Activity effective/safe/independent at 12/01/2023 1440   Time Frame short-term goal (STG) at 12/01/2023 1440   Progress/Outcome goal met at 12/05/2023 0935

## 2023-12-05 NOTE — PROGRESS NOTES
"     Cardiology   Progress Note     ASSESSMENT AND RECOMMENDATIONS     #ICM  #HF with mildly reduced EF (45-50%)  #Biventricular heart failure/cor pulmonale  #Severe TR    Patient is maintained on torsemide 10 mg PO BID at home. He presented with shortness of breath and cough and is being diuresed with lasix IV 40 BID. History of Justin's gangrene with SGLT2i use. Follows with Dr. Jeffrey. Severe TR complicates bedside assessment of volume status.    -Continue diuresis with lasix 40 mg IV BID; transition to PO tmrw  -Given pt's NYHA stage C, transition to metop succ 12.5 and continue valsartan 20 daily prior to discharge (if BP allows)  -CHF order set: strict I/Os, daily standing weights, cardiac diet, 2g Na, 2L fluids, daily electrolytes with replete for goal K >4.0 and Mg >2.0, continuous telemetry with reassess need daily  -Mild troponin elevation likely in the setting of demand ischemia due to heart failure exacerbation      #Permanent afib  He is off any anticoagulation due to his GI bleed   Did not want to pursue NISH closure device in the past and is rate controlled now      #CAD s/p CABG in 1996  Continue home plavix    Case discussed with primary team. Final recommendations are pending attending co-signature. Please page Cardiology at [phone] with any questions.     Alfredo Solo MD  Cardiology Fellow, PGY4    SUBJECTIVE     NAEO. On interview, patient denies CP. Reports improvement in swelling.  Telemetry reviewed: V paced with PVCs 70s    OBJECTIVE     PHYSICAL EXAM  Visit Vitals  BP (!) 121/56 (BP Location: Right upper arm, Patient Position: Lying)   Pulse 71   Temp 36.8 °C (98.2 °F) (Oral)   Resp 16   Ht 1.854 m (6' 1\")   Wt 68 kg (149 lb 14.4 oz)   SpO2 96%   BMI 19.78 kg/m²     Body mass index is 19.78 kg/m².  GEN: NAD, appears stated age, lying comfortably in bed  HEENT: EOMI, MMM, JVD 7 cm  CV: irregular rhythm, normal S1/S2, no murmurs  PULM: CTAB, mild bibasilar crackles  GI: SNTND  EXT: trace " BLE edema  DERM: no visible rashes, bruises, wounds  NEURO: AO3, follows commands appropriately, moves extremities spontaneously  PSYCH: anxious, cooperative      Intake/Output Summary (Last 24 hours) at 12/5/2023 0659  Last data filed at 12/5/2023 0000  24 Hour Net Input/Output from 7AM Yesterday   Intake 590 ml   Output 200 ml   Net 390 ml     Weights (last 5 days)     Date/Time Weight    12/05/23 0533 68 kg (149 lb 14.4 oz)    12/04/23 0406 68.4 kg (150 lb 12.8 oz)    12/03/23 0459 68.7 kg (151 lb 6.4 oz)    12/02/23 0500 68.3 kg (150 lb 8 oz)    12/01/23 0335 73.3 kg (161 lb 8 oz)          LABS  Results from last 7 days   Lab Units 11/29/23  1459   BNP pg/mL 467*     Results from last 7 days   Lab Units 12/05/23  0548 12/03/23  0514 12/02/23 0416 11/30/23  0544 11/29/23  1459   SODIUM mEQ/L 142 141 141   < > 143   POTASSIUM mEQ/L 3.7 3.8 3.6   < > 4.3   CHLORIDE mEQ/L 102 104 106   < > 112*   CO2 mEQ/L 32* 26 26   < > 26   BUN mg/dL 36* 34* 33*   < > 27*   CREATININE mg/dL 1.6* 1.7* 1.6*   < > 1.5*   CALCIUM mg/dL 8.6 8.4* 8.2*   < > 9.0   ALBUMIN g/dL  --   --   --   --  3.4*   BILIRUBIN TOTAL mg/dL  --   --   --   --  1.0   ALK PHOS IU/L  --   --   --   --  110   ALT IU/L  --   --   --   --  8   AST IU/L  --   --   --   --  42*   GLUCOSE mg/dL 89 101* 96   < > 150*    < > = values in this interval not displayed.     Results from last 7 days   Lab Units 12/05/23  0548 12/03/23  0513 12/02/23 0416   WBC K/uL 6.18 6.02 6.25   HEMOGLOBIN g/dL 10.7* 10.5* 10.7*   HEMATOCRIT % 33.8* 33.0* 33.1*   PLATELETS K/uL 155 142* 130*     Lab Results   Component Value Date    CHOL 105 08/04/2023    HDL 27 (L) 08/04/2023    LDLCALC 60 08/04/2023    TRIG 89 08/04/2023    TSH 0.02 (L) 11/29/2023    HGBA1C 6.4 (H) 08/03/2023       IMAGING  X-RAY CHEST 1 VIEW    Result Date: 12/3/2023  IMPRESSION: Continued bibasilar pleural-parenchymal disease, greater on the left than right. COMMENT: The current portable study the chest was  compared to that dated December 1, 2023 There is continued bibasilar pleural-parenchymal disease with complete obscuration of the left hemidiaphragm. Retrocardiac volume loss an pleural effusions are noted, more so on the left than right. There is no pulmonary vascular congestion or edema.    IR THORACENTESIS    Result Date: 12/2/2023  IMPRESSION: Successful ultrasound-guided left thoracentesis with 900 mL removed and discarded. I certify that I have personally reviewed this examination and agree with Betsy Benitez's report. Gigi Berry M.D.    X-RAY CHEST 1 VIEW    Result Date: 12/1/2023  IMPRESSION: Bilateral pleural effusions, enlarged on the right compared to the prior study. No other significant interval change    X-RAY CHEST 1 VIEW    Result Date: 11/30/2023  IMPRESSION: Decreased size of the left-sided pleural effusion/consolidation status post thoracentesis. No definite left pneumothorax. There is still moderate residual left effusion/consolidation.    IR THORACENTESIS    Result Date: 11/30/2023  IMPRESSION: Successful ultrasound-guided left-sided thoracentesis with 1.2 L pleural fluid removed and sent to the lab.  All components of the time-out, debriefing, and handling of the specimen were conducted as per the ASAP Specimen Protocol. I certify that I have personally reviewed this examination and agree with Avril Nava's report. Constantino Potts MD    X-RAY CHEST 2 VIEWS    Result Date: 11/29/2023  IMPRESSION: Cardiomegaly and pulmonary edema. Large left pleural effusion      CARDIAC EXAMS  Most recent Echo results:    TRANSTHORACIC ECHO (TTE) COMPLETE 08/04/2023 (Final) 8/4/2023    Interpretation Summary  Technically difficult study. Patient in atrial fibrillation with rates 75-85 bpm at time of examination.    The left ventricular cavity size is small due to compression from RV with normal wall thickness and mildly reduced systolic function. Estimated EF 45 to 50 %. D shaped left ventricular cavity in the  setting of severe RVoverload Unable to assess diastolic function due to atrial arrhythmia.    The aortic valve is tricuspid. Aortic valve and root sclerosis. Mild aortic regurgitation.    The visualized portion of the aortic root is normal in size.    The mitral valve is thickened. Mild mitral annular calcification. Mild mitral regurgitation.    The left atrium is mildly dilated    The right ventricular cavity is severely dilated with severely decreased systolic function. Nunes's sign is present.    The pulmonic valve is not well seen.    Severely dilated tricuspid valve annulus with tricuspid leaflets displaying a large coaptation gap; approximately 1 cm Severe tricuspid regurgitation with an estimated RVSP of at least 35 mmHg, but likely likely much higher in the setting of severe TR.  Diastolic flow reversal in the hepatic veins.    The right atrium is severely dilated.    The IVC is massively dilated and collapses < 50% with inspiration consistent with severely elevated right atrial pressures.    Large pleural effusion; cannot exclude a pericardial component.  No obvious chamber collapse.  Compared to previous echocardiogram from 8/4/22, no significant changes. Results communicated to primary team.              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)    • Coronary artery disease    • Disease of thyroid gland    • Will catheter in place    • GI (gastrointestinal bleed)    • Hypertension    • Myocardial infarction (CMS/HCC)    • Orthostatic hypotension    • TIA (transient ischemic attack)      Past Surgical History:   Procedure Laterality Date   • CHOLECYSTECTOMY     • CORONARY ARTERY BYPASS GRAFT     • JOINT REPLACEMENT Left     Knee   • THYROIDECTOMY       Social History     Socioeconomic History   • Marital status:     Tobacco Use   • Smoking status: Never   • Smokeless tobacco: Never   Vaping Use   • Vaping Use: Never used   Substance and Sexual Activity   • Alcohol use: Yes     Comment: social   • Drug use: Never     Social Determinants of Health     Financial Resource Strain: Low Risk  (8/4/2023)    Overall Financial Resource Strain (CARDIA)    • Difficulty of Paying Living Expenses: Not hard at all   Food Insecurity: No Food Insecurity (11/30/2023)    Hunger Vital Sign    • Worried About Running Out of Food in the Last Year: Never true    • Ran Out of Food in the Last Year: Never true   Transportation Needs: No Transportation Needs (12/1/2023)    PRAPARE - Transportation    • Lack of Transportation (Medical): No    • Lack of Transportation (Non-Medical): No   Housing Stability: Low Risk  (12/1/2023)    Housing Stability Vital Sign    • Unable to Pay for Housing in the Last Year: No    • Number of Places Lived in the Last Year: 1    • Unstable Housing in the Last Year: No     No family history on file.    ALLERGIES  Jardiance [empagliflozin]    HOME MEDICATIONS  Current Outpatient Medications   Medication Instructions   • ascorbic acid (VITAMIN C) 500 mg, oral, Every morning   • bimatoprost (LUMIGAN) 0.01 % ophthalmic drops 1 drop, Left Eye, Nightly   • calcium carbonate (TUMS) 200 mg calcium (500 mg) chewable tablet 1 tablet, oral, 2 times daily   • ciprofloxacin (CIPRO) 250 mg, oral, 2 times daily   • clopidogreL (PLAVIX) 75 mg, oral, Every morning   • cyanocobalamin (VITAMIN B12) 500 mcg, oral, Every morning   • escitalopram (LEXAPRO) 5 mg, oral, Every morning   • levothyroxine (SYNTHROID) 137 mcg, oral, Every morning   • loteprednol etabonate (LOTEMAX) 0.5 % ointment 1 Application, Left Eye, 3 times daily   • magnesium oxide (MAG-OX) 400 mg, oral, 2 times daily   • melatonin 3 mg, oral, Nightly   • mirtazapine (REMERON) 15 mg, oral, Nightly, Every other night    • multivitamin tablet 1 tablet, oral, Every morning   •  omeprazole OTC (PRILOSEC OTC) 20 mg, oral, Daily before breakfast   • potassium chloride (KLOR-CON) 10 mEq CR tablet 20 mEq, oral, 2 times daily   • thiamine 50 mg, oral, Daily   • tobramycin (TOBREX) 0.3 % ophthalmic solution 1 drop, Left Eye, Daily   • torsemide (DEMADEX) 10 mg, oral, Daily       INPATIENT MEDICATIONS  Scheduled:  • ascorbic acid  500 mg oral q AM   • calcium carbonate  500 mg oral BID   • clopidogreL  75 mg oral q AM   • cyanocobalamin  500 mcg oral q AM   • [Provider Managed Hold] escitalopram  5 mg oral q AM   • famotidine  10 mg oral Daily   • furosemide  40 mg intravenous BID (am, 4p)   • latanoprost  1 drop Left Eye Nightly   • levothyroxine  112 mcg oral Daily (6:30a)   • loteprednol  1 drop Left Eye TID   • magnesium oxide  400 mg oral BID   • melatonin  3 mg oral Nightly   • metoprolol tartrate  6.25 mg oral BID   • [Provider Managed Hold] mirtazapine  15 mg oral Nightly   • multivitamin  1 tablet oral q AM   • pantoprazole  20 mg oral Daily   • polyethylene glycol  17 g oral Daily   • thiamine  100 mg oral q AM   • tobramycin  1 drop Left Eye Daily   • [Provider Managed Hold] valsartan  20 mg oral Daily     Continuous Infusions:    PRN:

## 2023-12-05 NOTE — CONSULTS
Wound Ostomy Continence Note    Subjective    HPI Patient is a 89 y.o. male who was admitted on 11/29/2023 with a diagnosis of Pleural effusion on left [J90]  Chest pain, unspecified type [R07.9]  Acute congestive heart failure, unspecified heart failure type (CMS/HCC) [I50.9].    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 Pt seen and assessed. Pt awake, alert, NAD. Pt OOB in chair. Assisted patient to standing with Ax1 and walker.     Pt with moisture associated skin damage to gluteal cleft measuring 3 cm x 0.25 cm, + maceration. Skin intact at this time.     Pt reports using the urinal at times but sometimes can not reach urinal in time. Pt has colostomy.    Patient's Rip score at last bedside nursing assessment = 16. Pt is at high risk for the development of pressure injuries due to decreased activity / mobility and incontinence. Discussed this risk with patient  "and reviewed pressure injury prevention interventions- turning, repositioning, offloading, and moisture management. Pt engaged in conversation and expressed understanding.     Recommendations:  - Sacrum/gluteal cleft: Cleanse with NSS and cover with bordered silicone foam dressing. If having to change dressing frequently due to incontinence use barrier cream BID and PRN instead.   - Maintain Pressure Injury PREVENT Bundle, where applicable.     Wound Prevention Bundle                                                            Positioning- If clinically able:  Reposition at a minimum of 2 hours.  Adjust to avoid lying on medical devices. Use positioning devices to offload bony prominences. When out of bed, use pressure redistribution cushions. Monitor time out of bed and encourage repositioning. Use transfer aids to reduce friction and shear. Use \"turn assist\" function on bed surface if equipped.                  Risk Assessment:   Utilize risk assessment scale per hospital guidelines.  Identify additional risk factors, for example, medications and comorbidities. Match interventions to subscales of hospital's Risk assessment scale.  Monitor laboratory values. Communicate risk to interdisciplinary healthcare team. Consider use of prophylactic silicone foam sacral dressing.                  Evaluate Surface/Pressure Redistribution:   Consider specialty sleep surface according to patient need.   Utilize \"less is best\" with linen usage.                Vigilant Skin Inspection:   Inspect skin from head to toe, including areas under removable medical devices and dressings. Communicate skin issues to healthcare team and consult resources as needed.                   Evaluate incontinence/moisture/temperature:   Offer toileting when appropriate. Keep skin clean and dry (microclimate). Use moisture barrier products for incontinence care. Diapers/briefs for out of bed or off unit. Use absorbent under pads that wick away and " hold moisture. Intervene for fecal and/or urinary incontinence (consider external containment devices. Use skin emollients (dry skin).                  Nutrition:   Consult dietician for at risk patients. Assist with menu preferences and feeding. Encourage snacks, fluid and supplements with rounding. Accurately record all intake where indicated.           Teaching Patients and Families:   Encourage patients and families to partner with staff in preventing pressure injuries. Give patients and families pressure injury education, assess their understanding of education given, and document education.    Discussed plan of care with patient and patient's nurse, Ada. Wound care to sign off. Please re-consult for any further needs.       Date: 12/05/23  Signature: Ailyn Contreras RN

## 2023-12-06 ENCOUNTER — APPOINTMENT (INPATIENT)
Dept: RADIOLOGY | Facility: HOSPITAL | Age: 88
DRG: 291 | End: 2023-12-06
Attending: HOSPITALIST
Payer: MEDICARE

## 2023-12-06 LAB
ANION GAP SERPL CALC-SCNC: 11 MEQ/L (ref 3–15)
ATRIAL RATE: 326
BASOPHILS # BLD: 0.05 K/UL (ref 0.01–0.1)
BASOPHILS NFR BLD: 0.9 %
BUN SERPL-MCNC: 37 MG/DL (ref 7–25)
CALCIUM SERPL-MCNC: 8.6 MG/DL (ref 8.6–10.3)
CHLORIDE SERPL-SCNC: 102 MEQ/L (ref 98–107)
CO2 SERPL-SCNC: 26 MEQ/L (ref 21–31)
CREAT SERPL-MCNC: 1.6 MG/DL (ref 0.7–1.3)
DIFFERENTIAL METHOD BLD: ABNORMAL
EGFRCR SERPLBLD CKD-EPI 2021: 40.2 ML/MIN/1.73M*2
EOSINOPHIL # BLD: 0.22 K/UL (ref 0.04–0.54)
EOSINOPHIL NFR BLD: 3.9 %
ERYTHROCYTE [DISTWIDTH] IN BLOOD BY AUTOMATED COUNT: 16.8 % (ref 11.6–14.4)
GLUCOSE SERPL-MCNC: 94 MG/DL (ref 70–99)
HCT VFR BLDCO AUTO: 34.2 % (ref 40.1–51)
HGB BLD-MCNC: 11.2 G/DL (ref 13.7–17.5)
IMM GRANULOCYTES # BLD AUTO: 0.01 K/UL (ref 0–0.08)
IMM GRANULOCYTES NFR BLD AUTO: 0.2 %
LYMPHOCYTES # BLD: 0.93 K/UL (ref 1.2–3.5)
LYMPHOCYTES NFR BLD: 16.5 %
MAGNESIUM SERPL-MCNC: 2 MG/DL (ref 1.8–2.5)
MCH RBC QN AUTO: 32.4 PG (ref 28–33.2)
MCHC RBC AUTO-ENTMCNC: 32.7 G/DL (ref 32.2–36.5)
MCV RBC AUTO: 98.8 FL (ref 83–98)
MONOCYTES # BLD: 0.59 K/UL (ref 0.3–1)
MONOCYTES NFR BLD: 10.4 %
NEUTROPHILS # BLD: 3.85 K/UL (ref 1.7–7)
NEUTS SEG NFR BLD: 68.1 %
NRBC BLD-RTO: 0 %
PDW BLD AUTO: 10.8 FL (ref 9.4–12.4)
PLATELET # BLD AUTO: 156 K/UL (ref 150–350)
POTASSIUM SERPL-SCNC: 4 MEQ/L (ref 3.5–5.1)
QRS DURATION: 146
QT INTERVAL: 500
QTC CALCULATION(BAZETT): 503
R AXIS: 81
RBC # BLD AUTO: 3.46 M/UL (ref 4.5–5.8)
SODIUM SERPL-SCNC: 139 MEQ/L (ref 136–145)
T WAVE AXIS: -17
VENTRICULAR RATE: 61
WBC # BLD AUTO: 5.65 K/UL (ref 3.8–10.5)

## 2023-12-06 PROCEDURE — 99233 SBSQ HOSP IP/OBS HIGH 50: CPT | Performed by: HOSPITALIST

## 2023-12-06 PROCEDURE — 93005 ELECTROCARDIOGRAM TRACING: CPT | Performed by: HOSPITALIST

## 2023-12-06 PROCEDURE — 99232 SBSQ HOSP IP/OBS MODERATE 35: CPT | Performed by: STUDENT IN AN ORGANIZED HEALTH CARE EDUCATION/TRAINING PROGRAM

## 2023-12-06 PROCEDURE — 36415 COLL VENOUS BLD VENIPUNCTURE: CPT | Performed by: HOSPITALIST

## 2023-12-06 PROCEDURE — 63700000 HC SELF-ADMINISTRABLE DRUG

## 2023-12-06 PROCEDURE — 74018 RADEX ABDOMEN 1 VIEW: CPT

## 2023-12-06 PROCEDURE — 83735 ASSAY OF MAGNESIUM: CPT | Performed by: HOSPITALIST

## 2023-12-06 PROCEDURE — 63700000 HC SELF-ADMINISTRABLE DRUG: Performed by: HOSPITALIST

## 2023-12-06 PROCEDURE — 85025 COMPLETE CBC W/AUTO DIFF WBC: CPT | Performed by: HOSPITALIST

## 2023-12-06 PROCEDURE — 93010 ELECTROCARDIOGRAM REPORT: CPT | Performed by: INTERNAL MEDICINE

## 2023-12-06 PROCEDURE — 21400000 HC ROOM AND CARE CCU/INTERMEDIATE

## 2023-12-06 PROCEDURE — 97535 SELF CARE MNGMENT TRAINING: CPT | Mod: GO

## 2023-12-06 PROCEDURE — 63700000 HC SELF-ADMINISTRABLE DRUG: Performed by: INTERNAL MEDICINE

## 2023-12-06 PROCEDURE — 80048 BASIC METABOLIC PNL TOTAL CA: CPT | Performed by: HOSPITALIST

## 2023-12-06 PROCEDURE — 71045 X-RAY EXAM CHEST 1 VIEW: CPT

## 2023-12-06 RX ORDER — TORSEMIDE 20 MG/1
10 TABLET ORAL DAILY
Status: DISCONTINUED | OUTPATIENT
Start: 2023-12-06 | End: 2023-12-07

## 2023-12-06 RX ORDER — NYSTATIN 100000 [USP'U]/G
POWDER TOPICAL 2 TIMES DAILY
Status: DISCONTINUED | OUTPATIENT
Start: 2023-12-06 | End: 2023-12-07 | Stop reason: HOSPADM

## 2023-12-06 RX ADMIN — TOBRAMYCIN 1 DROP: 3 SOLUTION OPHTHALMIC at 08:46

## 2023-12-06 RX ADMIN — NYSTATIN: 100000 POWDER TOPICAL at 20:17

## 2023-12-06 RX ADMIN — MAGNESIUM OXIDE TAB 400 MG (241.3 MG ELEMENTAL MG) 400 MG: 400 (241.3 MG) TAB at 08:46

## 2023-12-06 RX ADMIN — MULTIPLE VITAMINS W/ MINERALS TAB 1 TABLET: TAB at 08:45

## 2023-12-06 RX ADMIN — LATANOPROST 1 DROP: 50 SOLUTION/ DROPS OPHTHALMIC at 20:18

## 2023-12-06 RX ADMIN — LEVOTHYROXINE SODIUM 112 MCG: 0.11 TABLET ORAL at 05:19

## 2023-12-06 RX ADMIN — CYANOCOBALAMIN TAB 1000 MCG 500 MCG: 1000 TAB at 08:45

## 2023-12-06 RX ADMIN — MAGNESIUM OXIDE TAB 400 MG (241.3 MG ELEMENTAL MG) 400 MG: 400 (241.3 MG) TAB at 20:17

## 2023-12-06 RX ADMIN — TORSEMIDE 10 MG: 20 TABLET ORAL at 09:28

## 2023-12-06 RX ADMIN — METOPROLOL TARTRATE 6.25 MG: 25 TABLET, FILM COATED ORAL at 20:25

## 2023-12-06 RX ADMIN — POLYETHYLENE GLYCOL 3350 17 G: 17 POWDER, FOR SOLUTION ORAL at 08:46

## 2023-12-06 RX ADMIN — LOTEPREDNOL ETABONATE 1 DROP: 5 SUSPENSION/ DROPS OPHTHALMIC at 20:19

## 2023-12-06 RX ADMIN — LOTEPREDNOL ETABONATE 1 DROP: 5 SUSPENSION/ DROPS OPHTHALMIC at 13:01

## 2023-12-06 RX ADMIN — LOTEPREDNOL ETABONATE 1 DROP: 5 SUSPENSION/ DROPS OPHTHALMIC at 08:46

## 2023-12-06 RX ADMIN — Medication 500 MG: at 08:45

## 2023-12-06 RX ADMIN — ANTACID TABLETS 500 MG: 500 TABLET, CHEWABLE ORAL at 08:46

## 2023-12-06 RX ADMIN — ANTACID TABLETS 500 MG: 500 TABLET, CHEWABLE ORAL at 20:17

## 2023-12-06 RX ADMIN — METOPROLOL TARTRATE 6.25 MG: 25 TABLET, FILM COATED ORAL at 08:46

## 2023-12-06 RX ADMIN — CLOPIDOGREL 75 MG: 75 TABLET ORAL at 08:46

## 2023-12-06 RX ADMIN — PANTOPRAZOLE SODIUM 20 MG: 20 TABLET, DELAYED RELEASE ORAL at 08:47

## 2023-12-06 RX ADMIN — NYSTATIN: 100000 POWDER TOPICAL at 13:01

## 2023-12-06 RX ADMIN — THIAMINE HCL TAB 100 MG 100 MG: 100 TAB at 08:46

## 2023-12-06 RX ADMIN — FAMOTIDINE 10 MG: 10 TABLET ORAL at 08:45

## 2023-12-06 ASSESSMENT — COGNITIVE AND FUNCTIONAL STATUS - GENERAL
WALKING IN HOSPITAL ROOM: 3 - A LITTLE
TOILETING: 3 - A LITTLE
AFFECT: WFL
DRESSING REGULAR LOWER BODY CLOTHING: 3 - A LITTLE
DRESSING REGULAR UPPER BODY CLOTHING: 3 - A LITTLE
MOVING TO AND FROM BED TO CHAIR: 3 - A LITTLE
EATING MEALS: 4 - NONE
HELP NEEDED FOR BATHING: 3 - A LITTLE
HELP NEEDED FOR PERSONAL GROOMING: 3 - A LITTLE
CLIMB 3 TO 5 STEPS WITH RAILING: 3 - A LITTLE
STANDING UP FROM CHAIR USING ARMS: 3 - A LITTLE

## 2023-12-06 NOTE — DISCHARGE SUMMARY
Hospital Medicine Service -  Inpatient Discharge Summary        Reason patient was admitted to the hospital:   Shortness of breath.     Found to have:    Pleural effusion, x2 thoracenteses, Volume overload improved    Important Issues to Address in Follow-Up  Volume status, sent on higher than prior dose of torsemide (20 in place of 10)  BP, some antihpertensives held d/t lower Bps, sent on 12.5 metop succ.   Mood meds held in prolonged QTc.   Stool regimen and Pepcid added in stool burden/ emesis during stay improved.       BRIEF OVERVIEW   Admitting Provider: Desire Zavala MD  Attending Provider: No att. providers found Attending phys phone: N/A    PCP: Zachery Hernandez [phone]    Admission Date: 11/29/2023  Discharge Date: 12/7/2023     DISCHARGE DIAGNOSES      Primary Discharge Diagnosis  Pleural effusion on left    Secondary Discharge Diagnoses  Active Hospital Problems    Diagnosis Date Noted   • UTI (urinary tract infection) 11/29/2023     Priority: High   • Pleural effusion on left 08/03/2023     Priority: High   • Acute on chronic systolic (congestive) heart failure (CMS/[facility]) 08/03/2023     Priority: High   • Atrial fibrillation (CMS/[facility]) 11/29/2023     Priority: Medium   • Cough 11/29/2023     Priority: Medium   • Ischemic congestive cardiomyopathy (CMS/[facility])      Priority: Medium   • Hyperlipidemia 11/29/2023     Priority: Low   • Hypothyroidism 11/29/2023     Priority: Low   • Depression 11/29/2023     Priority: Low   • CKD (chronic kidney disease) stage 3, GFR 30-59 ml/min (CMS/[facility]) 12/03/2023   • Pressure ulcer 12/03/2023      Resolved Hospital Problems   No resolved problems to display.       Problem List on Day of Discharge  UTI (urinary tract infection)  Assessment & Plan  Diagnosed outpatient, on ciprofloxacin 500 mg twice daily prescribed on 11/28 for 7-day course  -Ordered ceftriaxone on admission  to complete course, last day 12/5.     Acute on chronic systolic (congestive) heart failure  (CMS/[facility])  Assessment & Plan  Follows with Dr. Jeffrey cardiology  -CABG in 1996; adenosine MPI negative for ischemia in 6/13  -Maintained on Plavix and torsemide 10 mg daily with as needed doses available for weight gain  -TTE (1/2023): 40-45%; ASM; severely dilated RV; severe TR; large left pleural effusion; RVSP calculated at 31 mmHg which is likely underestimated; IVC is very dilated at 5 cm  -Admitted with CHF order set  -Home diuretic torsemide 10 mg, reduced from previous dose of 20 mg  -Hold home diuretic and continue with 40 mg IV Lasix twice daily  -Monitor for hypotension and pull back from IV diuresis as needed  -Cards consulted, follow recommendations, switch to PO tomorrow (PTA torsemide but higher dose, 20 instead of 10)   DCed home , to be picked up 3pm 12/7 by one of his sons.     * Pleural effusion on left  Assessment & Plan  -CXR showed cardiomegaly and pulmonary edema. Large left pleural effusion.  -Notably not hypoxic in the ED, saturating 96% on room air. Symptomatic with cough    -Exudative by Light's criteria on prior admission in August. Repeat fluid ounces this admission again with exudative fluid  -Underwent thoracentesis x 2 (1.2 L, 900 cc), -  pleural fluid studies noted:    Pleural protein/serum protein 3.9/7.5 = 0.52; serum LDH not performed  -Likely in the setting of CHF exacerbation  -Continue IV diuresis  CXR 12/06/23 without worsening/acute concerns    Cough  Assessment & Plan  Suspected volume overload> aspiration,   No s/s PNA (also checked CXR repeat over the weekend),  SLP input was to consult GI,     12/04/23 this morning he had a long coughing and emesis spell following taking 2 pills, after some time he was able to tolerate a small spoonful of applesauce.     Appreciate GI input. Suspect stool burden. KUB shows stool burden without obstruction, completed 12/05/23,   pepcid, miralax, zofran prn (all ordered except tigan for latter given current qtc >500).   SLP consult in  place  Improvement on KUB 12/06/23    Atrial fibrillation (CMS/HCC)  Assessment & Plan  Follows with Dr. Jeffrey cardiology OV 9/5/23  - Not on anticoagulation due to Hx of rectal bleeding, followed by GI at [facility]  - He considered NISH closure device and has decided to not pursue  -Rate controlled A-fib on admission, asymptomatic  -Continue home metoprolol succinate 25 mg, consistent fill history    Ischemic congestive cardiomyopathy (CMS/HCC)  Assessment & Plan  Follows with Dr. Jeffrey cardiology  -CABG in 1996; adenosine MPI negative for ischemia in 6/13  -Maintained on Plavix and torsemide 10 mg daily with as needed doses available for weight gain  -TTE (1/2023): 40-45%; ASM; severely dilated RV; severe TR; large left pleural effusion; RVSP calculated at 31 mmHg which is likely underestimated; IVC is very dilated at 5 cm  12/04/23 appreciate Cards recs! -rec to add metop which was done,  but could not tolerate Valsartan with Bps.     Depression  Assessment & Plan  Continue home escitalopram and mirtazapine - CONTINUED to hold with discharge, continues to have very elevated QTc.     Hypothyroidism  Assessment & Plan  -Repeat TFT with TSH 0.02 and T4 1.89  -Home dose Levothyroxine 137mcg  -Decreased to 112mcg  -Will need outpatient follow-up for repeat labs in 4 to 6 weeks    Hyperlipidemia  Assessment & Plan  Previously had been on atorvastatin 40 mg, last dispense for 30 tablets in 12/2022  -Hold on admission seeing likely not taking  -Check lipid panel for completeness in the morning    Pressure ulcer  Assessment & Plan  Sacrum pressure injury stage 2, present on admission.     SUMMARY OF HOSPITALIZATION      Presenting Problem/History of Present Illness  This is a 89 y.o. year-old male admitted on 11/29/2023 with Pleural effusion on left [J90]  Chest pain, unspecified type [R07.9]  Acute congestive heart failure, unspecified heart failure type (CMS/HCC) [I50.9].    Hospital Course/Summary- see Problem list  above for details:   Patient is a 89 y.o. 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  Fluid analysis exudative, similar to prior admission. Cards consulted for acute on chronic heart exacerbation, currently on IV diuresis  Improved, transitioned to 20 torsemide  Course c/b stool burden which was addressed.   See details above.       Exam on Day of Discharge  Vitals and nursing note reviewed.   General: Awake, alert. No acute distress. Elderly but mentally sharp, appears stated age or slightly younger. Body mass index is 20.13 kg/m².  HEENT: PERRL/EOMI; Sclerae anicteric. Moist mucous membranes.   Lungs: Decreased breath sounds on left side, bilateral lower base crackles  Cardiovascular: Regular rate and rhythm. + systolic murmur   Abdomen: Soft, non tender, non distended. + Ostomy bag with brown stool  Extremities:trace to minimal gisele bilaterally.  Neuro: No focal deficits  Psych: AAOx3; full range affect.     Consults During Admission  IP CONSULT TO CASE MANAGEMENT  IP CONSULT TO NUTRITION SERVICES  IP CONSULT TO CARDIOLOGY  IP CONSULT TO WOUND OSTOMY CONTINENCE  IP CONSULT TO GASTROENTEROLOGY    DISCHARGE MEDICATIONS          Medication List      START taking these medications    famotidine 10 mg tablet  Commonly known as: PEPCID  Start taking on: December 8, 2023  Take 1 tablet (10 mg total) by mouth daily Indications: gastroesophageal reflux disease.  Dose: 10 mg     nystatin 100,000 unit/gram powder  Commonly known as: MYCOSTATIN  Apply topically 2 (two) times a day.     polyethylene glycol 17 gram packet  Commonly known as: MIRALAX  Start taking on: December 8, 2023  Take 17 g by  mouth daily for 3 days.  Dose: 17 g        CHANGE how you take these medications    levothyroxine 112 mcg tablet  Commonly known as: SYNTHROID  Start taking on: December 8, 2023  Take 1 tablet (112 mcg total) by mouth daily.  Dose: 112 mcg  What changed:   · medication strength  · how much to take  · when to take this     metoprolol succinate XL 25 mg 24 hr tablet  Commonly known as: TOPROL-XL  Take 0.5 tablets (12.5 mg total) by mouth daily.  Dose: 12.5 mg  What changed: how much to take     torsemide 20 mg tablet  Commonly known as: DEMADEX  Take 1 tablet (20 mg total) by mouth daily.  Dose: 20 mg  What changed:   · medication strength  · how much to take        CONTINUE taking these medications    ascorbic acid 500 mg tablet  Commonly known as: VITAMIN C  Take 500 mg by mouth every morning.  Dose: 500 mg     calcium carbonate 200 mg calcium (500 mg) chewable tablet  Commonly known as: TUMS  Take 1 tablet by mouth 2 (two) times a day.  Dose: 1 tablet     clopidogreL 75 mg tablet  Commonly known as: PLAVIX  Take 75 mg by mouth every morning.  Dose: 75 mg     cyanocobalamin 500 mcg tablet  Commonly known as: VITAMIN B12  Take 500 mcg by mouth every morning.  Dose: 500 mcg     LOTEMAX 0.5 % ointment  Apply 1 Application to left eye 3 (three) times a day.  Dose: 1 Application  Generic drug: loteprednol etabonate     LUMIGAN 0.01 % ophthalmic drops  Administer 1 drop into the left eye nightly.  Dose: 1 drop  Generic drug: bimatoprost     magnesium oxide 400 mg (241.3 mg magnesium) tablet  Commonly known as: MAG-OX  Take 400 mg by mouth 2 (two) times a day.  Dose: 400 mg     melatonin 3 mg tablet  Take 3 mg by mouth nightly.  Dose: 3 mg     multivitamin tablet  Take 1 tablet by mouth every morning.  Dose: 1 tablet     potassium chloride 10 mEq CR tablet  Commonly known as: KLOR-CON  Take 20 mEq by mouth 2 (two) times a day.  Dose: 20 mEq     PriLOSEC OTC 20 mg EC tablet  Take 20 mg by mouth daily before  breakfast.  Dose: 20 mg  Generic drug: omeprazole OTC     thiamine 50 mg tablet  Take 50 mg by mouth daily.  Dose: 50 mg     tobramycin 0.3 % ophthalmic solution  Commonly known as: TOBREX  Administer 1 drop into the left eye daily.  Dose: 1 drop        STOP taking these medications    ciprofloxacin 250 mg tablet  Commonly known as: CIPRO     escitalopram 5 mg tablet  Commonly known as: LEXAPRO     mirtazapine 15 mg tablet  Commonly known as: REMERON             Instructions for after discharge     Discharge diet      Diet Type / Texture: Regular    Fluid restriction dietary / 24h: 1500 mL Fluid    Follow-up with primary physician (PCP)      It is very important that you followup with your primary care physician within one to 2 weeks of discharge. Your doctor should see you after your hospital stay and review a discharge summary which highlights what was done and changed during your hospital stay.    Post-Discharge Activity: Normal activity as tolerated.      Normal activity as tolerated.             PROCEDURES / LABS / IMAGING      Operative Procedures  Two thoracenteses     Other Procedures  none    Pertinent Labs  CBC Results       12/05/23 12/03/23 12/02/23     0548 0513 0416    WBC 6.18 6.02 6.25    RBC 3.40 3.30 3.34    HGB 10.7 10.5 10.7    HCT 33.8 33.0 33.1    MCV 99.4 100.0 99.1    MCH 31.5 31.8 32.0    MCHC 31.7 31.8 32.3     142 130        CMP Results       12/05/23 12/03/23 12/02/23     0548 0514 0416     141 141    K 3.7 3.8 3.6    Cl 102 104 106    CO2 32 26 26    Glucose 89 101 96    BUN 36 34 33    Creatinine 1.6 1.7 1.6    Calcium 8.6 8.4 8.2    Anion Gap 8 11 9    EGFR 40.2 37.9 40.6         Comment for EGFR at 0548 on 12/05/23: Calculation based on the Chronic Kidney Disease Epidemiology Collaboration (CKD-EPI) equation refit without adjustment for race.        Troponin I Results       11/29/23 11/29/23 08/03/23     1728 1459 1152    HS Troponin I 24.8 23.4 20.4          SARS-CoV-2  (COVID-19) (no units)   Date/Time Value   08/18/2022 1156 Positive (AA)       Pertinent Imaging  ECG 12 lead         X-RAY CHEST 1 VIEW   Final Result   IMPRESSION:   Please see above.      X-RAY ABDOMEN 1 VIEW   Final Result   IMPRESSION:   Please see above      ECG 12 lead   Final Result      ECG 12 lead   Final Result      X-RAY ABDOMEN 1 VIEW   Final Result   IMPRESSION:   Nonobstructive bowel gas pattern. Significant stool overlying the left-sided   ostomy.      ECG 12 lead   Final Result      X-RAY CHEST 1 VIEW   Final Result   IMPRESSION: Continued bibasilar pleural-parenchymal disease, greater on the left   than right.      COMMENT: The current portable study the chest was compared to that dated   December 1, 2023      There is continued bibasilar pleural-parenchymal disease with complete   obscuration of the left hemidiaphragm. Retrocardiac volume loss an pleural   effusions are noted, more so on the left than right.      There is no pulmonary vascular congestion or edema.      ECG 12 lead   Final Result      X-RAY CHEST 1 VIEW   Final Result   IMPRESSION:      Bilateral pleural effusions, enlarged on the right compared to the prior study.   No other significant interval change      IR THORACENTESIS   Final Result   IMPRESSION: Successful ultrasound-guided left thoracentesis with 900 mL removed   and discarded.      I certify that I have personally reviewed this examination and agree with   Betsy Benitez's report.   Gigi Berry M.D.      ECG 12 lead   Final Result      X-RAY CHEST 1 VIEW   Final Result   IMPRESSION: Decreased size of the left-sided pleural effusion/consolidation   status post thoracentesis. No definite left pneumothorax.      There is still moderate residual left effusion/consolidation.      IR THORACENTESIS   Final Result   IMPRESSION: Successful ultrasound-guided left-sided thoracentesis with 1.2 L   pleural fluid removed and sent to the lab.  All components of the time-out,   debriefing,  and handling of the specimen were conducted as per the ASAP Specimen   Protocol.      I certify that I have personally reviewed this examination and agree with Avril Nava's report.   Constantino Potts MD      X-RAY CHEST 2 VIEWS   Final Result   IMPRESSION:      Cardiomegaly and pulmonary edema. Large left pleural effusion      EKG 12 lead   ED Interpretation   Afib 74, RBBB, nonspecific St/T      Final Result      ECG 12 lead    (Results Pending)       OUTPATIENT  FOLLOW-UP / REFERRALS / PENDING TESTS        Outpatient Follow-Up Appointments            In 5 days HEID[facility]    In 2 months Mat Bryant MD [facility]          Referrals  No orders of the defined types were placed in this encounter.      Test Results Pending at Discharge  Unresulted Labs (From admission, onward)     Start     Ordered    12/04/23 1236  Basic metabolic panel  Daily      Question:  Release to patient  Answer:  Immediate   Order ID Start Status   [id number] 12/04/23 1236 Needs to be Collected    12/06/23 0600 Scheduled    12/07/23 0600 Scheduled    12/08/23 0600 Scheduled    12/09/23 0600 Scheduled    12/10/23 0600 Scheduled       12/04/23 1235    12/04/23 1236  CBC and differential  Daily      Question:  Release to patient  Answer:  Immediate   Order ID Start Status   [id number] 12/04/23 1236 Needs to be Collected    12/06/23 0600 Scheduled    12/07/23 0600 Scheduled    12/08/23 0600 Scheduled    12/09/23 0600 Scheduled    12/10/23 0600 Scheduled       12/04/23 1235    12/04/23 1236  Magnesium  Daily      Question:  Release to patient  Answer:  Immediate   Order ID Start Status   [id number] 12/04/23 1236 Needs to be Collected    12/06/23 0600 Scheduled    12/07/23 0600 Scheduled    12/08/23 0600 Scheduled    12/09/23 0600 Scheduled    12/10/23 0600 Scheduled       12/04/23 1235    11/29/23 1931  Fungal culture / smear Pleural Fluid, Left  (Thoracentesis  Left Diagnostic with labs in IR)  Once        Question Answer Comment   Collect in IR Yes    Release to patient Immediate    Release to patient Immediate        11/29/23 1930 11/29/23 1931  AFB culture Pleural Fluid, Left  (Thoracentesis Left Diagnostic with labs in IR)  Once        Question Answer Comment   Collect in IR Yes    Release to patient Immediate    Release to patient Immediate        11/29/23 1930                    DISCHARGE DISPOSITION AND DESTINATION      Disposition: Home Health Care - [facility]  Destination: Home                            Discharge Diet:        Dietary Orders   (From admission, onward)             Start     Ordered    12/01/23 1603  Adult Diet Easy to Chew EC7; Thin Liquids; 2000 mL Fluid; Cardiac (Low Sodium/Low Fat); RD/LDN may adjust order  Diet effective now        References:    IDDSI Diet reference   Question Answer Comment   Diet Texture Easy to Chew EC7    Fluid Consistency: Thin Liquids    Fluid restriction dietary / 24h: 2000 mL Fluid    Other Restriction(s): Cardiac (Low Sodium/Low Fat)    Delegation of Authority. Diet orders written by PA/Av may not be adjusted by RD/LDNs. RD/LDN may adjust order        12/01/23 1603                 Code Status At Discharge: DNR (A.N.D.)    Physician Order for Life-Sustaining Treatment Document Status      No documents found                38 minutes were spent with patient and or family members collecting history and coordinating discharge plan and materials.

## 2023-12-06 NOTE — PROGRESS NOTES
CHF Discharge Counseling- Transitions of Care   Pharmacy Counseling Note      SUMMARY   Met with Samy Elena's son via telephone for CHF Discharge counseling.     Medications:   The patient's son is familiar with home medications and reports daily medication adherence at home. Patient has a 24 hour caregiver to assist with medication administration and patient's son manages/organizes medications. Prior to admission medication reconciliation was completed by the ED pharmacy team.     Preferred Rx: [id number]  Requesting Refills: levothyroxine, torsemide 20 mg tablets, valsartan 40 mg tablets     I explained pertinent medication changes:   -Initiation of low dose metoprolol succinate 12.5 mg daily and valsartan 20 mg daily. Confirmed patient has an existing home supply of metoprolol succinate 25 mg tablets based on refill history. Instructed patient's son to ensure the metoprolol succinate and valsartan tablets are split in half to provide a dosage of 12.5 mg and 20 mg, respectively. Confirmed patient's caregiver regularly monitors patient's blood pressure.   -Dosage increase of torsemide from 10 mg to 20 mg daily. Encouraged to continue to take the diuretic in the morning.    -Escitalopram and mirtazapine have been hold during admission due to QTc prolongation with daily monitoring of EKGs. Notified patient's son that both medications may potentially be held upon discharge until QTc improvement with continuation of melatonin at bedtime.   -Dosage change of levothyroxine from 137 mcg to 112 mcg. Recommended to take daily on an empty stomach. Discussed the importance of outpatient follow up including lab work, cardiology office visits, etc.  -Patient would occasionally take NSAIDs prior to admission. Mentioned to avoid NSAIDs and suggested acetaminophen alone for mild pain and headaches, if needed.     Reviewed all patient’s current medications in detail including the name, dose, frequency, indication  and side effects. Advised patient to look at the AVS to note any new additions, changes, and discontinuations in medications at discharge. Discussed the importance of medication adherence.     Diet: Education on low-sodium diet provided. Recommended to minimize processed foods, canned goods and salty snacks. Offered patient a heart healthy, low sodium recipe book.     Fluid: Recommended a fluid restriction to ~6 cups/day, 1500 mL or ~50 oz. Suggested other alternatives such as ice chips, iced fruits, and sugar-free mint, gum or hard candy.    Scale: Daily Calendar to track weight was given to patient. Explained the importance of weighing oneself and recording. If patient gains more than 3 lbs in a day or 5 lbs in a week, advised patient to call doctor.     I left patient with my contact information for any further questions.     MEDICATION     Medication List  • ascorbic acid  500 mg oral q AM   • calcium carbonate  500 mg oral BID   • clopidogreL  75 mg oral q AM   • cyanocobalamin  500 mcg oral q AM   • [Provider Managed Hold] escitalopram  5 mg oral q AM   • famotidine  10 mg oral Daily   • [Provider Managed Hold] furosemide  40 mg intravenous BID (am, 4p)   • latanoprost  1 drop Left Eye Nightly   • levothyroxine  112 mcg oral Daily (6:30a)   • loteprednol  1 drop Left Eye TID   • magnesium oxide  400 mg oral BID   • melatonin  3 mg oral Nightly   • metoprolol tartrate  6.25 mg oral BID   • [Provider Managed Hold] mirtazapine  15 mg oral Nightly   • multivitamin  1 tablet oral q AM   • nystatin   Topical BID   • pantoprazole  20 mg oral Daily   • polyethylene glycol  17 g oral Daily   • thiamine  100 mg oral q AM   • tobramycin  1 drop Left Eye Daily   • torsemide  10 mg oral Daily   • [Provider Managed Hold] valsartan  20 mg oral Daily     •  trimethobenzamide        Michelel Mendez, PharmD, Transitions of Care Clinical Pharmacist    Vamsi: [phone]  Office: [phone]  (Time Spent: 25 minutes)  12/6/2023

## 2023-12-06 NOTE — PLAN OF CARE
Problem: Adult Inpatient Plan of Care  Goal: Plan of Care Review  Outcome: Progressing  Flowsheets (Taken 12/6/2023 0300)  Progress: no change  Outcome Evaluation: Pt aaox4, pt Samish. Pt vss throughout shift, pt remains in controlled afib. Pt denies any pain or sob. Incont care provided. Pt meds crushed in applesauce. Hourly rounding maintained throughout shift, pt oriented to call light within reach. Bed low and bed alarm set.  Plan of Care Reviewed With: patient     Problem: Adult Inpatient Plan of Care  Goal: Patient-Specific Goal (Individualized)  Flowsheets (Taken 12/6/2023 0300)  Patient/Family-Specific Goals (Include Timeframe): Pt to go to home with home health tomorrow  Individualized Care Needs: Meds crushed in applesauce

## 2023-12-06 NOTE — DISCHARGE INSTRUCTIONS
Dear Samy Elena,      You presented to the hospital with complaints of worsening cough  You were found to have Heart Failure and accumulation of fluid around lung which was drained.    Important Medication adjustment notes: These include addition of pepcid due to your GI symptoms, bowel regimen to soft stools as you had some stool burden during your hospital stay. Your thyroid medication was adjusted due to your numbers while hospitalized. Your mood medications are held due to some thing called a Qtc on your EKGs which means it is NOT safe to take these UNLESS approved by your primary care doctor. Do not take these unless you are told to do so again by a professional medical provider. Nystatin powder is prescribed for a groin rash (you are welcome to take this over the counter if you prefer, any antifungal agent, powder is preferred as it will dry the fungal areas that prefer moisture.  Your fluid pill dosing was increased from 10mg to 20mg. You were started on a metoprolol medication as well.     Please do the following for the management of your heart failure:  --Weigh yourself everyday in the morning after you urinate and before you eat breakfast. Keep a log. If you gain more than 3lbs in 1 day or 5lbs in 1 week, call your cardiologist as your diuretic may need to be revised  --Maintain a low salt diet (less than 2g per day) and restrict your fluid intake to no more than 2000ml per day  --Take your medications exactly as prescribed  --Follow up with Cardiology within 1-2 weeks, call the number found on your instructions to schedule an appointment.   --Your new dry weight is 152 lbs.  Weigh yourself every day. A sudden weight gain can mean your heart failure is getting worse. If your weight goes up by more than 2 pounds in 1 day, 5 pounds in 1 week please contact your PCP or cardiologist. This is a sign that you are retaining more fluid than you should be. Clues to weight gain include checking your ankles  for swelling, or noticing you are short of breath when you lie down.   ---Please follow up with PCP within 1-2 weeks to discuss this hospitalization and for continued care, call to schedule an appointment.     Follow up with Cardiologist Dr. Jeffrey, call to ask to be seen.      It has been a pleasure caring for you at [facility], we wish you the best of health moving forward.

## 2023-12-06 NOTE — PROGRESS NOTES
"     Cardiology   Progress Note     ASSESSMENT AND RECOMMENDATIONS     #ICM  #HF with mildly reduced EF (45-50%)  #Biventricular heart failure/cor pulmonale  #Severe TR    Patient is maintained on torsemide 10 mg PO BID at home. He presented with shortness of breath and cough and is being diuresed with lasix IV 40 BID. History of Justin's gangrene with SGLT2i use. Follows with Dr. Jeffrey. Severe TR complicates bedside assessment of volume status.    -PO torsemide 20 daily for maintenance dosing  -Given pt's NYHA stage C, transition to metop succ 12.5 daily and continue valsartan 20 daily prior to discharge (if BP allows)  -CHF order set: strict I/Os, daily standing weights, cardiac diet, 2g Na, 2L fluids, daily electrolytes with replete for goal K >4.0 and Mg >2.0, continuous telemetry with reassess need daily  -Mild troponin elevation likely in the setting of demand ischemia due to heart failure exacerbation  -Follow up with Dr. Jeffrey     #Permanent afib  He is off any anticoagulation due to his GI bleed   Did not want to pursue NISH closure device in the past and is rate controlled now      #CAD s/p CABG in 1996  Continue home plavix    Case discussed with primary team. Final recommendations are pending attending co-signature. Please page Cardiology at [phone] with any questions.     Alfredo Solo MD  Cardiology Fellow, PGY4    SUBJECTIVE     NAEO. On interview, patient denies CP, SOB. Reports improvement in swelling.  Telemetry reviewed: V paced with PVCs 70s    OBJECTIVE     PHYSICAL EXAM  Visit Vitals  /64 (BP Location: Right upper arm, Patient Position: Lying)   Pulse 67   Temp 36.8 °C (98.3 °F) (Oral)   Resp 16   Ht 1.854 m (6' 1\")   Wt 68 kg (149 lb 14.4 oz)   SpO2 95%   BMI 19.78 kg/m²     Body mass index is 19.78 kg/m².  GEN: NAD, appears stated age, lying comfortably in bed  HEENT: EOMI, MMM, JVD 7 cm  CV: irregular rhythm, normal S1/S2, no murmurs  PULM: diminished bibasilar BS  GI: " SNTND  EXT: trace BLE edema  DERM: no visible rashes, bruises, wounds  NEURO: AO3, follows commands appropriately, moves extremities spontaneously  PSYCH: anxious, cooperative      Intake/Output Summary (Last 24 hours) at 12/6/2023 0659  Last data filed at 12/6/2023 0500  24 Hour Net Input/Output from 7AM Yesterday   Intake 1350 ml   Output 575 ml   Net 775 ml     Weights (last 5 days)     Date/Time Weight    12/06/23 0512 68 kg (149 lb 14.4 oz)    12/05/23 0533 68 kg (149 lb 14.4 oz)    12/04/23 0406 68.4 kg (150 lb 12.8 oz)    12/03/23 0459 68.7 kg (151 lb 6.4 oz)    12/02/23 0500 68.3 kg (150 lb 8 oz)    12/01/23 0335 73.3 kg (161 lb 8 oz)          LABS  Results from last 7 days   Lab Units 11/29/23  1459   BNP pg/mL 467*     Results from last 7 days   Lab Units 12/06/23  0517 12/05/23  0548 12/03/23  0514 11/30/23  0544 11/29/23  1459   SODIUM mEQ/L 139 142 141   < > 143   POTASSIUM mEQ/L 4.0 3.7 3.8   < > 4.3   CHLORIDE mEQ/L 102 102 104   < > 112*   CO2 mEQ/L 26 32* 26   < > 26   BUN mg/dL 37* 36* 34*   < > 27*   CREATININE mg/dL 1.6* 1.6* 1.7*   < > 1.5*   CALCIUM mg/dL 8.6 8.6 8.4*   < > 9.0   ALBUMIN g/dL  --   --   --   --  3.4*   BILIRUBIN TOTAL mg/dL  --   --   --   --  1.0   ALK PHOS IU/L  --   --   --   --  110   ALT IU/L  --   --   --   --  8   AST IU/L  --   --   --   --  42*   GLUCOSE mg/dL 94 89 101*   < > 150*    < > = values in this interval not displayed.     Results from last 7 days   Lab Units 12/06/23  0517 12/05/23  0548 12/03/23  0513   WBC K/uL 5.65 6.18 6.02   HEMOGLOBIN g/dL 11.2* 10.7* 10.5*   HEMATOCRIT % 34.2* 33.8* 33.0*   PLATELETS K/uL 156 155 142*     Lab Results   Component Value Date    CHOL 105 08/04/2023    HDL 27 (L) 08/04/2023    LDLCALC 60 08/04/2023    TRIG 89 08/04/2023    TSH 0.02 (L) 11/29/2023    HGBA1C 6.4 (H) 08/03/2023       IMAGING  X-RAY ABDOMEN 1 VIEW    Result Date: 12/5/2023  IMPRESSION: Nonobstructive bowel gas pattern. Significant stool overlying the  left-sided ostomy.    X-RAY CHEST 1 VIEW    Result Date: 12/3/2023  IMPRESSION: Continued bibasilar pleural-parenchymal disease, greater on the left than right. COMMENT: The current portable study the chest was compared to that dated December 1, 2023 There is continued bibasilar pleural-parenchymal disease with complete obscuration of the left hemidiaphragm. Retrocardiac volume loss an pleural effusions are noted, more so on the left than right. There is no pulmonary vascular congestion or edema.    IR THORACENTESIS    Result Date: 12/2/2023  IMPRESSION: Successful ultrasound-guided left thoracentesis with 900 mL removed and discarded. I certify that I have personally reviewed this examination and agree with Betsy Benitez's report. Gigi Berry M.D.    X-RAY CHEST 1 VIEW    Result Date: 12/1/2023  IMPRESSION: Bilateral pleural effusions, enlarged on the right compared to the prior study. No other significant interval change    X-RAY CHEST 1 VIEW    Result Date: 11/30/2023  IMPRESSION: Decreased size of the left-sided pleural effusion/consolidation status post thoracentesis. No definite left pneumothorax. There is still moderate residual left effusion/consolidation.    IR THORACENTESIS    Result Date: 11/30/2023  IMPRESSION: Successful ultrasound-guided left-sided thoracentesis with 1.2 L pleural fluid removed and sent to the lab.  All components of the time-out, debriefing, and handling of the specimen were conducted as per the ASAP Specimen Protocol. I certify that I have personally reviewed this examination and agree with Avril Nava's report. Constantino Potts MD    X-RAY CHEST 2 VIEWS    Result Date: 11/29/2023  IMPRESSION: Cardiomegaly and pulmonary edema. Large left pleural effusion      CARDIAC EXAMS  Most recent Echo results:    TRANSTHORACIC ECHO (TTE) COMPLETE 08/04/2023 (Final) 8/4/2023    Interpretation Summary  Technically difficult study. Patient in atrial fibrillation with rates 75-85 bpm at time of  examination.    The left ventricular cavity size is small due to compression from RV with normal wall thickness and mildly reduced systolic function. Estimated EF 45 to 50 %. D shaped left ventricular cavity in the setting of severe RVoverload Unable to assess diastolic function due to atrial arrhythmia.    The aortic valve is tricuspid. Aortic valve and root sclerosis. Mild aortic regurgitation.    The visualized portion of the aortic root is normal in size.    The mitral valve is thickened. Mild mitral annular calcification. Mild mitral regurgitation.    The left atrium is mildly dilated    The right ventricular cavity is severely dilated with severely decreased systolic function. Nunes's sign is present.    The pulmonic valve is not well seen.    Severely dilated tricuspid valve annulus with tricuspid leaflets displaying a large coaptation gap; approximately 1 cm Severe tricuspid regurgitation with an estimated RVSP of at least 35 mmHg, but likely likely much higher in the setting of severe TR.  Diastolic flow reversal in the hepatic veins.    The right atrium is severely dilated.    The IVC is massively dilated and collapses < 50% with inspiration consistent with severely elevated right atrial pressures.    Large pleural effusion; cannot exclude a pericardial component.  No obvious chamber collapse.  Compared to previous echocardiogram from 8/4/22, no significant changes. Results communicated to primary team.              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)    • Coronary artery disease    • Disease of thyroid gland    • Will catheter in place    • GI (gastrointestinal bleed)    • Hypertension    • Myocardial infarction (CMS/HCC)    • Orthostatic hypotension    • TIA (transient ischemic attack)      Past Surgical History:    Procedure Laterality Date   • CHOLECYSTECTOMY     • CORONARY ARTERY BYPASS GRAFT     • JOINT REPLACEMENT Left     Knee   • THYROIDECTOMY       Social History     Socioeconomic History   • Marital status:    Tobacco Use   • Smoking status: Never   • Smokeless tobacco: Never   Vaping Use   • Vaping Use: Never used   Substance and Sexual Activity   • Alcohol use: Yes     Comment: social   • Drug use: Never     Social Determinants of Health     Financial Resource Strain: Low Risk  (8/4/2023)    Overall Financial Resource Strain (CARDIA)    • Difficulty of Paying Living Expenses: Not hard at all   Food Insecurity: No Food Insecurity (11/30/2023)    Hunger Vital Sign    • Worried About Running Out of Food in the Last Year: Never true    • Ran Out of Food in the Last Year: Never true   Transportation Needs: No Transportation Needs (12/1/2023)    PRAPARE - Transportation    • Lack of Transportation (Medical): No    • Lack of Transportation (Non-Medical): No   Housing Stability: Low Risk  (12/1/2023)    Housing Stability Vital Sign    • Unable to Pay for Housing in the Last Year: No    • Number of Places Lived in the Last Year: 1    • Unstable Housing in the Last Year: No     No family history on file.    ALLERGIES  Jardiance [empagliflozin]    HOME MEDICATIONS  Current Outpatient Medications   Medication Instructions   • ascorbic acid (VITAMIN C) 500 mg, oral, Every morning   • bimatoprost (LUMIGAN) 0.01 % ophthalmic drops 1 drop, Left Eye, Nightly   • calcium carbonate (TUMS) 200 mg calcium (500 mg) chewable tablet 1 tablet, oral, 2 times daily   • ciprofloxacin (CIPRO) 250 mg, oral, 2 times daily   • clopidogreL (PLAVIX) 75 mg, oral, Every morning   • cyanocobalamin (VITAMIN B12) 500 mcg, oral, Every morning   • escitalopram (LEXAPRO) 5 mg, oral, Every morning   • levothyroxine (SYNTHROID) 137 mcg, oral, Every morning   • loteprednol etabonate (LOTEMAX) 0.5 % ointment 1 Application, Left Eye, 3 times daily   •  magnesium oxide (MAG-OX) 400 mg, oral, 2 times daily   • melatonin 3 mg, oral, Nightly   • mirtazapine (REMERON) 15 mg, oral, Nightly, Every other night    • multivitamin tablet 1 tablet, oral, Every morning   • omeprazole OTC (PRILOSEC OTC) 20 mg, oral, Daily before breakfast   • potassium chloride (KLOR-CON) 10 mEq CR tablet 20 mEq, oral, 2 times daily   • thiamine 50 mg, oral, Daily   • tobramycin (TOBREX) 0.3 % ophthalmic solution 1 drop, Left Eye, Daily   • torsemide (DEMADEX) 10 mg, oral, Daily       INPATIENT MEDICATIONS  Scheduled:  • ascorbic acid  500 mg oral q AM   • calcium carbonate  500 mg oral BID   • clopidogreL  75 mg oral q AM   • cyanocobalamin  500 mcg oral q AM   • [Provider Managed Hold] escitalopram  5 mg oral q AM   • famotidine  10 mg oral Daily   • [Provider Managed Hold] furosemide  40 mg intravenous BID (am, 4p)   • latanoprost  1 drop Left Eye Nightly   • levothyroxine  112 mcg oral Daily (6:30a)   • loteprednol  1 drop Left Eye TID   • magnesium oxide  400 mg oral BID   • melatonin  3 mg oral Nightly   • metoprolol tartrate  6.25 mg oral BID   • [Provider Managed Hold] mirtazapine  15 mg oral Nightly   • multivitamin  1 tablet oral q AM   • pantoprazole  20 mg oral Daily   • polyethylene glycol  17 g oral Daily   • thiamine  100 mg oral q AM   • tobramycin  1 drop Left Eye Daily   • torsemide  10 mg oral Daily   • [Provider Managed Hold] valsartan  20 mg oral Daily     Continuous Infusions:    PRN:

## 2023-12-06 NOTE — PROGRESS NOTES
Occupational Therapy -  Daily Treatment/Progress Note     Patient: Samy Elena  Location: [place] [address]  MRN: [id number]  Today's date: 12/6/2023    HISTORY OF PRESENT ILLNESS     Samy is a 89 y.o. male admitted on 11/29/2023 with Pleural effusion on left [J90]  Chest pain, unspecified type [R07.9]  Acute congestive heart failure, unspecified heart failure type (CMS/HCC) [I50.9]. Principal problem is Pleural effusion on left.    Past Medical History  Samy has a past medical history of Aneurysm of internal iliac artery (CMS/HCC), Atrial fibrillation (CMS/HCC), CHF (congestive heart failure) (CMS/[facility]), 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[facility]), Coronary artery disease, Disease of thyroid gland, Will catheter in place, GI (gastrointestinal bleed), Hypertension, Myocardial infarction (CMS/[facility]), Orthostatic hypotension, and TIA (transient ischemic attack).    History of Present Illness   Pleural effusion, UTI, s/p thoracentesis    PRIOR LEVEL OF FUNCTION AND LIVING ENVIRONMENT     Prior Level of Function    Flowsheet Row Most Recent Value   Dominant Hand right   Ambulation assistive equipment   Transferring assistive equipment   Toileting assistive person   Bathing assistive person   Dressing assistive person   Eating independent   IADLs assistive person   Communication understands/communicates without difficulty   Swallowing difficulty swallowing foods   Baseline Diet/Method of Nutritional Intake regular, thin liquids   Past History of Dysphagia Pt reports difficulty swallowing tougher solids at baseline. Hx of GERD, prior GI bleed, and diverticulitis s/p colostomy. Prefers to consume softer, regular solids at baseline. VFSS completed 1/5/23 with recommendations for a soft & bite-sized solid and mildly thick liquid diet at that time due to aspiration of thin liquids via straw sips. Pt with a positive sensory response which was mostly effective at clearing aspiration from the airway at that  time.   Prior Level of Function Comment Pt was independent with eating/drinking prior to admission.   Assistive Device Currently Used at Home wheelchair, scale, walker, standard        Prior Living Environment    Flowsheet Row Most Recent Value   People in Home alone  [w 24/7 A]   Current Living Arrangements home   Home Accessibility ramp to enter home   Living Environment Comment pt lives in 2SH, ramp to enter, first floor set up          VITALS AND PAIN     OT Vitals    Date/Time Pulse SpO2 Pt Activity O2 Therapy BP BP Location BP Method Pt Position [facility]   12/06/23 1415 78 98 % At rest None (Room air) 111/73 Left upper arm Automatic Lying LS      OT Pain    Date/Time Pain Type Rating: Rest Rating: Activity [facility]   12/06/23 1415 Pain Assessment 0 - no pain 0 - no pain LS           Objective   OBJECTIVE     Start time:  1415  End time:  1437  Session Length: 22 min  Mode of Treatment: individual therapy, occupational therapy    General Observations  Patient received reclined, in bed. He was no issues or concerns identified by nurse prior to session, agreeable to therapy. agreeable    Precautions: aspiration, fall, other (see comments) (DNR, 2000mL fluid restriction, ostomy)       Limitations/Impairments: hearing      OT Eval and Treat - 12/06/23 1415        Cognition    Orientation Status oriented x 4     Affect/Mental Status WFL     Follows Commands WFL     Cognitive Function WFL     Comment, Cognition A&O, following commands, meaningfully participating in OT session        Bed Mobility    Bed Mobility Activities supine to sit;left     Frannie supervision     Safety/Cues increased time to complete     Assistive Device head of bed elevated;bed rails     Comment OOB to L side, no physical assistance. owns hosp bed        Mobility Belt    Mobility Belt Used for All Out of Bed Activity no     Reason Mobility Belt Not Used medical contraindication     Reason Mobility Belt Not Used recent thoracentisis/(+) ostomy         Sit/Stand Transfer    Surface elevated bed;chair with arm rests     [place] minimum assist (75% or more patient effort)     Safety/Cues increased time to complete;minimal;verbal cues;hand placement;technique;proper use of assistive device;preparatory posture     Assistive Device walker, front-wheeled     Transfer Comments from elevated EOB/to recliner. from/to recliner/Req 2 attempts to stand, (+) cues for preparatory posture. Incr effort from low recliner surface. Good- eccentric control upon descent        Functional Mobility    Distance in room/bathroom     Functional Mobility [place] close supervision     Safety/Cues increased time to complete;minimal;verbal cues;hand placement;technique     Assistive Device walker, front-wheeled     Functional Mobility Comments Pt performed func mob of limited HH distance with RW. Req 1 seated rest break. EOB >recliner. Recliner to/from bathroom door. endorsing incr fatigue with prolonged OOB activity, requesting to sit        Grooming    Tasks washes, rinses and dries face;washes, rinses and dries hands     [place] set up     Position supported sitting     Setup Assistance obtain supplies     Adaptive Equipment none     Comment offered oral/hair care, Pt deferring having previously performed.        Toileting    Comment offered, deferred. (+) urinal within reach at end of session. Aide provides ostomy (A) daily        Self-Feeding    Tasks scoop food onto utensil;utensil to mouth     [place] set up     Position supported sitting     Comment with set-up table top, WFL        Balance    Static Sitting Balance WFL;sitting, edge of bed     Dynamic Sitting Balance WFL;sitting, edge of bed     Sit to Stand Dynamic Balance mild impairment;supported     Static Standing Balance mild impairment;supported     Dynamic Standing Balance mild impairment;supported     Balance Interventions occupation based/functional task     Comment, Balance benefits from RW, no overt  LOB. Balance impacted by decr activity tolerance/endurance strength        Impairments/Safety Issues    Impairments Affecting Function balance;endurance/activity tolerance;strength     Functional Endurance fair+, progressing. requires seated rest breaks        Upper Extremity (Therapeutic Exercise)    Exercise Position/Type seated     General Exercise bicep curl     Range of Motion Exercises shoulder flexion/extension     Reps and Sets 1x10     Comment seated in chair                                  Session Outcome  Patient upright, in chair, leg(s) elevated at end of session, chair alarm on, all needs met, call light in reach, personal items in reach. Nursing notified about change in vital signs, patient's response to therapy/activity, patient's performance, and patient's position.    AM-PAC™ - ADL (Current Function)     Putting on/taking off regular lower body clothing 3 - A Little   Bathing 3 - A Little   Toileting 3 - A Little   Putting on/taking off regular upper body clothing 3 - A Little   Help for taking care of personal grooming 3 - A Little   Eating meals 4 - None   AM-PAC™ ADL Score 19      ASSESSMENT AND PLAN     Progress Summary  OT tx complete. Samy cont to progress toward OT goals. He req SUP for bed mob, Min x1 with RW for sit<>stands from elevated/low surfaces, CLS with RW for limted func mob in room. Set-up for seated grooming/eating. Samy is quick to fatigue and benefits from sitting rest breaks. His func performance is further impacted by decr balance/activity tolerance/endurance/strength. Cont to rec d/chome with resumption of care routine (24hr aide) and HHOT. Cont with OT  POC    Patient/Family Therapy Goal Statement: to go home safely    OT Plan    Flowsheet Row Most Recent Value   Rehab Potential good, to achieve stated therapy goals at 12/06/2023 1415   Therapy Frequency 3 times/wk at 12/06/2023 1415   Planned Therapy Interventions activity tolerance training, neuromuscular  control/coordination retraining, patient/caregiver education/training, transfer/mobility retraining, adaptive equipment training, functional balance retraining, occupation/activity based interventions, BADL retraining, ROM/therapeutic exercise, cognitive retraining, passive ROM/stretching, strengthening exercise at 12/06/2023 1415          OT Discharge Recommendations    Flowsheet Row Most Recent Value   OT Recommended Discharge Disposition home with assistance, home with home health at 12/06/2023 1415   Anticipated Equipment Needs if Discharged Home (OT) other (see comments), none  [owns all necessary DME] at 12/06/2023 1415               OT Goals    Flowsheet Row Most Recent Value   Bed Mobility Goal 1    Activity/Assistive Device bed mobility activities, all at 12/01/2023 0852   [place] modified independence at 12/01/2023 0852   Time Frame by discharge at 12/01/2023 0852   Progress/Outcome goal ongoing at 12/04/2023 1030   Transfer Goal 1    Activity/Assistive Device all transfers at 12/04/2023 1030   [place] supervision required at 12/04/2023 1030   Time Frame by discharge at 12/04/2023 1030   Progress/Outcome goal revised this date at 12/04/2023 1030   Dressing Goal 1    Activity/Adaptive Equipment dressing skills, all at 12/04/2023 1030   [place] minimum assist (75% or more patient effort) at 12/04/2023 1030   Time Frame by discharge at 12/04/2023 1030   Progress/Outcome goal revised this date at 12/04/2023 1030

## 2023-12-06 NOTE — PLAN OF CARE
Problem: Adult Inpatient Plan of Care  Goal: Plan of Care Review  12/6/2023 1535 by Taniya Calvo, RN  Outcome: Progressing  Flowsheets (Taken 12/6/2023 1535)  Progress: no change  Outcome Evaluation: Pt AAOx4, calm and cooperative w/ care. VSS. Meds given as prescribed. Call bell within reach, incontinence care done. Pt bathed and teeth brushed. Pt up to chair x2. No complaints of pain. Bed alarm and chair alarm placed for safety. Call bell within reach.  Plan of Care Reviewed With: patient  12/6/2023 1532 by Taniya Calvo, RN  Outcome: Progressing  Flowsheets (Taken 12/6/2023 1532)  Progress: no change  Outcome Evaluation: pt  Plan of Care Reviewed With: patient

## 2023-12-06 NOTE — PROGRESS NOTES
Hospital Medicine Service -  Daily Progress Note       SUBJECTIVE   Interval History: No acute events overnight.     Patient states, he does feel better.   He has not thrown up today    He asks about coordination of rides for discharge  - explained we would work on that    Spoke to son this afternoon 5:40 PM, discussed plan for discharge omorrow, he asks for 3pm and will have brother  the patient then and have home care arranged. Questions answered about exercise tolerance (recommend ok to go out in cold without doing aggressive exercise such as shoveling snow, ok  To do stairs if breaks taken, ok to try to stay on one floor of the house)    OBJECTIVE      Vital signs in last 24 hours:  Temp:  [36.2 °C (97.2 °F)-36.8 °C (98.3 °F)] 36.7 °C (98.1 °F)  Heart Rate:  [57-78] 68  Resp:  [16] 16  BP: ()/(56-73) 103/59    Intake/Output Summary (Last 24 hours) at 12/6/2023 1740  Last data filed at 12/6/2023 1645  Gross per 24 hour   Intake 250 ml   Output 725 ml   Net -475 ml     Weights (last 7 days)     Date/Time Weight    12/06/23 1027 69.2 kg (152 lb 9.6 oz)    12/06/23 0512 68 kg (149 lb 14.4 oz)    12/05/23 0533 68 kg (149 lb 14.4 oz)    12/04/23 0406 68.4 kg (150 lb 12.8 oz)    12/03/23 0459 68.7 kg (151 lb 6.4 oz)    12/02/23 0500 68.3 kg (150 lb 8 oz)    12/01/23 0335 73.3 kg (161 lb 8 oz)    11/29/23 23:03:36 74.4 kg (164 lb)         PHYSICAL EXAMINATION      General: Awake, alert. No acute distress. Elderly but mentally sharp, appears stated age or slightly younger. Body mass index is 20.13 kg/m².  HEENT: PERRL/EOMI; Sclerae anicteric. Moist mucous membranes.   Lungs: Decreased breath sounds on left side, bilateral lower base crackles  Cardiovascular: Regular rate and rhythm. + systolic murmur   Abdomen: Soft, non tender, non distended. + Ostomy bag with brown stool  Extremities:trace to minimal gisele bilaterally.  Neuro: No focal deficits  Psych: AAOx3; full range affect   LINES, CATHETERS,  DRAINS, AIRWAYS, AND WOUNDS   Lines, Drains, and Airways:  Wounds (agree with documentation and present on admission):     LABS / IMAGING / TELE      Labs reviewed.    Imaging  No results found for this or any previous visit.    X-RAY CHEST 1 VIEW  Narrative: CLINICAL HISTORY: Diminished sounds at the left base. Possible left pleural  effusion.    COMMENT:  A portable upright AP view of the chest was performed.    Comparison: 12/3/2023    Cardiac monitoring leads obscure portions of the underlying lungs. A moderate  left pleural effusion is present with adjacent airspace disease. There is  persistent dense opacification of the retrocardiac region. A small right pleural  effusion is present with adjacent airspace disease. The cardiac silhouette is  suboptimally assessed but grossly stable noting cardiomegaly. Postsurgical  changes are present from a median sternotomy. Mediastinal clips are present.  Aortic atherosclerosis is noted.  Impression: IMPRESSION:  Please see above.  X-RAY ABDOMEN 1 VIEW  Narrative: CLINICAL HISTORY: Stool burden. Emesis.    COMMENT:  A supine AP view of the abdomen is performed.    Comparison: 12/5/2023    There is a nonobstructive bowel gas pattern. Evaluation for free intraperitoneal  air is limited by supine positioning. There is persistent but diminished whole  overlying the left-sided ostomy. Surgical clips are present in the right upper  quadrant. Numerous calcifications are present in the left upper quadrant which  correspond to splenic arterial calcifications. Significant degenerative changes  are present in the regional osseous structures. The bones are diffusely  demineralized.  Impression: IMPRESSION:  Please see above      ECG/Telemetry  Reviewed     ASSESSMENT AND PLAN      UTI (urinary tract infection)  Assessment & Plan  Diagnosed outpatient, on ciprofloxacin 500 mg twice daily prescribed on 11/28 for 7-day course  -Will order ceftriaxone on admission  to complete course,  last day 12/5.     Acute on chronic systolic (congestive) heart failure (CMS/HCC)  Assessment & Plan  Follows with Dr. Jeffrey cardiology  -CABG in 1996; adenosine MPI negative for ischemia in 6/13  -Maintained on Plavix and torsemide 10 mg daily with as needed doses available for weight gain  -TTE (1/2023): 40-45%; ASM; severely dilated RV; severe TR; large left pleural effusion; RVSP calculated at 31 mmHg which is likely underestimated; IVC is very dilated at 5 cm  -Admit with CHF order set  -Home diuretic torsemide 10 mg, reduced from previous dose of 20 mg  -Hold home diuretic and continue with 40 mg IV Lasix twice daily  -Monitor for hypotension and pull back from IV diuresis as needed  -Cards consulted, follow recommendations, switch to PO tomorrow (PTA torsemide but higher dose, 20 instead of 10)  DC home , to be picked up 3pm 12/7 by one of his sons.       * Pleural effusion on left  Assessment & Plan  -CXR showed cardiomegaly and pulmonary edema. Large left pleural effusion.  -Notably not hypoxic in the ED, saturating 96% on room air. Symptomatic with cough    -Exudative by Light's criteria on prior admission in August. Repeat fluid ounces this admission again with exudative fluid  -Underwent thoracentesis x 2 (1.2 L, 900 cc), -  pleural fluid studies noted:    Pleural protein/serum protein 3.9/7.5 = 0.52; serum LDH not performed  -Likely in the setting of CHF exacerbation  -Continue IV diuresis  CXR 12/06/23 without worsening/acute concerns    Cough  Assessment & Plan  Suspected volume overload> aspiration,   No s/s PNA (also checked CXR repeat over the weekend),  SLP input was to consult GI,     12/04/23 this morning he had a long coughing and emesis spell following taking 2 pills, after some time he was able to tolerate a small spoonful of applesauce.     Appreciate GI input. Suspect stool burden. KUB shows stool burden without obstruction, completed 12/05/23,   pepcid, miralax, zofran prn (all ordered  except tigan for latter given current qtc >500).   SLP consult in place  Improvement on KUB 12/06/23      Atrial fibrillation (CMS/HCC)  Assessment & Plan  Follows with Dr. Jeffrey cardiology OV 9/5/23  - Not on anticoagulation due to Hx of rectal bleeding, followed by GI at [facility]  - He considered NISH closure device and has decided to not pursue  -Rate controlled A-fib on admission, asymptomatic  -Continue home metoprolol succinate 25 mg, consistent fill history    Ischemic congestive cardiomyopathy (CMS/HCC)  Assessment & Plan  Follows with Dr. Jeffrey cardiology  -CABG in 1996; adenosine MPI negative for ischemia in 6/13  -Maintained on Plavix and torsemide 10 mg daily with as needed doses available for weight gain  -TTE (1/2023): 40-45%; ASM; severely dilated RV; severe TR; large left pleural effusion; RVSP calculated at 31 mmHg which is likely underestimated; IVC is very dilated at 5 cm  12/04/23 appreciate Cards recs! -rec to add metop/valsartan low dose (very low doses tentatively ordered but BP soft, holding valsartan to see AM BP first)    Depression  Assessment & Plan  Continue home escitalopram and mirtazapine (former had been held, resumed now_    Hypothyroidism  Assessment & Plan  -Repeat TFT with TSH 0.02 and T4 1.89  -Home dose Levothyroxine 137mcg  -Will decrease to 112mcg  -Will need outpatient follow-up for repeat labs in 4 to 6 weeks    Hyperlipidemia  Assessment & Plan  Previously had been on atorvastatin 40 mg, last dispense for 30 tablets in 12/2022  -Hold on admission seeing likely not taking  -Check lipid panel for completeness in the morning    Pressure ulcer  Assessment & Plan  Sacrum pressure injury stage 2, present on admission.       Addendum  - CKD3.   Sacrum pressure injury stage 2, present on admission.      VTE Assessment: Padua    VTE Prophylaxis:  Current anticoagulants:    •None    SCDs  Code Status: DNR (A.N.D.)      Estimated Discharge Date: 12/7/2023     Disposition Planning:  Pending improved clinical status and continued IV diuresis, after monitoring on PO diuresis, arrange for 12/7 AM.      Crystal Alonso,   12/6/2023   50 minutes were spent with patient and or family members collecting an (interval) history, performing a pertinent physical exam and coordinating care and decision making with other healthcare providers and specialists in this complex case.

## 2023-12-07 VITALS
OXYGEN SATURATION: 96 % | SYSTOLIC BLOOD PRESSURE: 95 MMHG | HEART RATE: 69 BPM | DIASTOLIC BLOOD PRESSURE: 55 MMHG | RESPIRATION RATE: 18 BRPM | BODY MASS INDEX: 20.21 KG/M2 | WEIGHT: 152.5 LBS | TEMPERATURE: 97.4 F | HEIGHT: 73 IN

## 2023-12-07 LAB
ANION GAP SERPL CALC-SCNC: 10 MEQ/L (ref 3–15)
ATRIAL RATE: 62
BASOPHILS # BLD: 0.06 K/UL (ref 0.01–0.1)
BASOPHILS NFR BLD: 0.9 %
BUN SERPL-MCNC: 41 MG/DL (ref 7–25)
CALCIUM SERPL-MCNC: 8.8 MG/DL (ref 8.6–10.3)
CHLORIDE SERPL-SCNC: 100 MEQ/L (ref 98–107)
CO2 SERPL-SCNC: 26 MEQ/L (ref 21–31)
CREAT SERPL-MCNC: 1.7 MG/DL (ref 0.7–1.3)
DIFFERENTIAL METHOD BLD: ABNORMAL
EGFRCR SERPLBLD CKD-EPI 2021: 37.4 ML/MIN/1.73M*2
EOSINOPHIL # BLD: 0.24 K/UL (ref 0.04–0.54)
EOSINOPHIL NFR BLD: 3.7 %
ERYTHROCYTE [DISTWIDTH] IN BLOOD BY AUTOMATED COUNT: 16.5 % (ref 11.6–14.4)
GLUCOSE SERPL-MCNC: 87 MG/DL (ref 70–99)
HCT VFR BLDCO AUTO: 34 % (ref 40.1–51)
HGB BLD-MCNC: 10.8 G/DL (ref 13.7–17.5)
IMM GRANULOCYTES # BLD AUTO: 0.02 K/UL (ref 0–0.08)
IMM GRANULOCYTES NFR BLD AUTO: 0.3 %
LYMPHOCYTES # BLD: 1.12 K/UL (ref 1.2–3.5)
LYMPHOCYTES NFR BLD: 17.3 %
MAGNESIUM SERPL-MCNC: 2.1 MG/DL (ref 1.8–2.5)
MCH RBC QN AUTO: 31.5 PG (ref 28–33.2)
MCHC RBC AUTO-ENTMCNC: 31.8 G/DL (ref 32.2–36.5)
MCV RBC AUTO: 99.1 FL (ref 83–98)
MONOCYTES # BLD: 0.64 K/UL (ref 0.3–1)
MONOCYTES NFR BLD: 9.9 %
NEUTROPHILS # BLD: 4.41 K/UL (ref 1.7–7)
NEUTS SEG NFR BLD: 67.9 %
NRBC BLD-RTO: 0 %
PDW BLD AUTO: 11 FL (ref 9.4–12.4)
PLATELET # BLD AUTO: 156 K/UL (ref 150–350)
POTASSIUM SERPL-SCNC: 4.1 MEQ/L (ref 3.5–5.1)
QRS DURATION: 142
QT INTERVAL: 470
QTC CALCULATION(BAZETT): 531
R AXIS: 84
RBC # BLD AUTO: 3.43 M/UL (ref 4.5–5.8)
SODIUM SERPL-SCNC: 136 MEQ/L (ref 136–145)
T WAVE AXIS: -22
VENTRICULAR RATE: 77
WBC # BLD AUTO: 6.49 K/UL (ref 3.8–10.5)

## 2023-12-07 PROCEDURE — 63700000 HC SELF-ADMINISTRABLE DRUG: Performed by: INTERNAL MEDICINE

## 2023-12-07 PROCEDURE — 93005 ELECTROCARDIOGRAM TRACING: CPT | Performed by: HOSPITALIST

## 2023-12-07 PROCEDURE — 83735 ASSAY OF MAGNESIUM: CPT | Performed by: HOSPITALIST

## 2023-12-07 PROCEDURE — 99239 HOSP IP/OBS DSCHRG MGMT >30: CPT | Performed by: HOSPITALIST

## 2023-12-07 PROCEDURE — 80048 BASIC METABOLIC PNL TOTAL CA: CPT | Performed by: HOSPITALIST

## 2023-12-07 PROCEDURE — 63700000 HC SELF-ADMINISTRABLE DRUG

## 2023-12-07 PROCEDURE — 63700000 HC SELF-ADMINISTRABLE DRUG: Performed by: HOSPITALIST

## 2023-12-07 PROCEDURE — 36415 COLL VENOUS BLD VENIPUNCTURE: CPT | Performed by: HOSPITALIST

## 2023-12-07 PROCEDURE — 85025 COMPLETE CBC W/AUTO DIFF WBC: CPT | Performed by: HOSPITALIST

## 2023-12-07 PROCEDURE — 93010 ELECTROCARDIOGRAM REPORT: CPT | Performed by: INTERNAL MEDICINE

## 2023-12-07 RX ORDER — TORSEMIDE 20 MG/1
20 TABLET ORAL DAILY
Status: DISCONTINUED | OUTPATIENT
Start: 2023-12-08 | End: 2023-12-07 | Stop reason: HOSPADM

## 2023-12-07 RX ORDER — METOPROLOL TARTRATE 25 MG/1
6.25 TABLET, FILM COATED ORAL 2 TIMES DAILY
Qty: 15 TABLET | Refills: 0 | Status: SHIPPED | OUTPATIENT
Start: 2023-12-07 | End: 2023-12-07 | Stop reason: HOSPADM

## 2023-12-07 RX ORDER — NYSTATIN 100000 [USP'U]/G
POWDER TOPICAL 2 TIMES DAILY
Qty: 30 G | Refills: 1 | Status: SHIPPED | OUTPATIENT
Start: 2023-12-07 | End: 2024-01-06

## 2023-12-07 RX ORDER — POLYETHYLENE GLYCOL 3350 17 G/17G
17 POWDER, FOR SOLUTION ORAL DAILY
Qty: 3 PACKET | Refills: 0 | Status: SHIPPED | OUTPATIENT
Start: 2023-12-08 | End: 2024-03-26

## 2023-12-07 RX ORDER — METOPROLOL SUCCINATE 25 MG/1
12.5 TABLET, EXTENDED RELEASE ORAL DAILY
Qty: 15 TABLET | Refills: 0 | Status: ON HOLD | OUTPATIENT
Start: 2023-12-07 | End: 2024-03-29

## 2023-12-07 RX ORDER — METOPROLOL SUCCINATE 25 MG/1
25 TABLET, EXTENDED RELEASE ORAL DAILY
Status: ON HOLD | COMMUNITY
End: 2023-12-07

## 2023-12-07 RX ORDER — TORSEMIDE 10 MG/1
20 TABLET ORAL DAILY
Qty: 60 TABLET | Refills: 0 | Status: SHIPPED | OUTPATIENT
Start: 2023-12-07 | End: 2023-12-07 | Stop reason: HOSPADM

## 2023-12-07 RX ORDER — LEVOTHYROXINE SODIUM 112 UG/1
112 TABLET ORAL
Qty: 30 TABLET | Refills: 0 | Status: SHIPPED | OUTPATIENT
Start: 2023-12-08 | End: 2024-03-30 | Stop reason: HOSPADM

## 2023-12-07 RX ORDER — FAMOTIDINE 10 MG/1
10 TABLET ORAL DAILY
Qty: 30 TABLET | Refills: 0 | Status: SHIPPED | OUTPATIENT
Start: 2023-12-08 | End: 2025-02-04 | Stop reason: SDUPTHER

## 2023-12-07 RX ORDER — TORSEMIDE 20 MG/1
20 TABLET ORAL DAILY
Qty: 30 TABLET | Refills: 0 | Status: SHIPPED | OUTPATIENT
Start: 2023-12-07 | End: 2024-03-26

## 2023-12-07 RX ADMIN — THIAMINE HCL TAB 100 MG 100 MG: 100 TAB at 09:18

## 2023-12-07 RX ADMIN — POLYETHYLENE GLYCOL 3350 17 G: 17 POWDER, FOR SOLUTION ORAL at 09:15

## 2023-12-07 RX ADMIN — TORSEMIDE 10 MG: 20 TABLET ORAL at 09:15

## 2023-12-07 RX ADMIN — TOBRAMYCIN 1 DROP: 3 SOLUTION OPHTHALMIC at 09:21

## 2023-12-07 RX ADMIN — PANTOPRAZOLE SODIUM 20 MG: 20 TABLET, DELAYED RELEASE ORAL at 09:17

## 2023-12-07 RX ADMIN — LEVOTHYROXINE SODIUM 112 MCG: 0.11 TABLET ORAL at 06:08

## 2023-12-07 RX ADMIN — CYANOCOBALAMIN TAB 1000 MCG 500 MCG: 1000 TAB at 09:17

## 2023-12-07 RX ADMIN — NYSTATIN: 100000 POWDER TOPICAL at 09:21

## 2023-12-07 RX ADMIN — ANTACID TABLETS 500 MG: 500 TABLET, CHEWABLE ORAL at 09:18

## 2023-12-07 RX ADMIN — LOTEPREDNOL ETABONATE 1 DROP: 5 SUSPENSION/ DROPS OPHTHALMIC at 09:21

## 2023-12-07 RX ADMIN — FAMOTIDINE 10 MG: 10 TABLET ORAL at 09:17

## 2023-12-07 RX ADMIN — MAGNESIUM OXIDE TAB 400 MG (241.3 MG ELEMENTAL MG) 400 MG: 400 (241.3 MG) TAB at 09:17

## 2023-12-07 RX ADMIN — Medication 500 MG: at 09:17

## 2023-12-07 RX ADMIN — METOPROLOL TARTRATE 6.25 MG: 25 TABLET, FILM COATED ORAL at 09:16

## 2023-12-07 RX ADMIN — MULTIPLE VITAMINS W/ MINERALS TAB 1 TABLET: TAB at 09:18

## 2023-12-07 RX ADMIN — CLOPIDOGREL 75 MG: 75 TABLET ORAL at 09:16

## 2023-12-07 ASSESSMENT — COGNITIVE AND FUNCTIONAL STATUS - GENERAL
CLIMB 3 TO 5 STEPS WITH RAILING: 3 - A LITTLE
MOVING TO AND FROM BED TO CHAIR: 3 - A LITTLE
WALKING IN HOSPITAL ROOM: 3 - A LITTLE
STANDING UP FROM CHAIR USING ARMS: 3 - A LITTLE

## 2023-12-07 NOTE — PROGRESS NOTES
Spoke with patient's son, Marko Elena via telephone about patient's updated medication list. Initially counseled yesterday on 12/6/2023 (initial counseling note below). Notified patient's son that valsartan will be held due to lower blood pressure measurements at this time. Reiterated the initiation of metoprolol succinate and the importance of splitting the 25 mg tablet in order to ensure an accurate dose of 12.5 mg daily.       CHF Discharge Counseling- Transitions of Care   Pharmacy Counseling Note      SUMMARY   Met with Samy Elena's son via telephone for CHF Discharge counseling.     Medications:   The patient's son is familiar with home medications and reports daily medication adherence at home. Patient has a 24 hour caregiver to assist with medication administration and patient's son manages/organizes medications. Prior to admission medication reconciliation was completed by the ED pharmacy team.     Preferred Rx: [id number]  Requesting Refills: levothyroxine, torsemide 20 mg tablets, valsartan 40 mg tablets     I explained pertinent medication changes:   -Initiation of low dose metoprolol succinate 12.5 mg daily and valsartan 20 mg daily. Confirmed patient has an existing home supply of metoprolol succinate 25 mg tablets based on refill history. Instructed patient's son to ensure the metoprolol succinate and valsartan tablets are split in half to provide a dosage of 12.5 mg and 20 mg, respectively. Confirmed patient's caregiver regularly monitors patient's blood pressure.   -Dosage increase of torsemide from 10 mg to 20 mg daily. Encouraged to continue to take the diuretic in the morning.    -Escitalopram and mirtazapine have been hold during admission due to QTc prolongation with daily monitoring of EKGs. Notified patient's son that both medications may potentially be held upon discharge until QTc improvement with continuation of melatonin at bedtime.   -Dosage change of levothyroxine from  137 mcg to 112 mcg. Recommended to take daily on an empty stomach. Discussed the importance of outpatient follow up including lab work, cardiology office visits, etc.  -Patient would occasionally take NSAIDs prior to admission. Mentioned to avoid NSAIDs and suggested acetaminophen alone for mild pain and headaches, if needed.     Reviewed all patient’s current medications in detail including the name, dose, frequency, indication and side effects. Advised patient to look at the AVS to note any new additions, changes, and discontinuations in medications at discharge. Discussed the importance of medication adherence.     Diet: Education on low-sodium diet provided. Recommended to minimize processed foods, canned goods and salty snacks. Offered patient a heart healthy, low sodium recipe book.     Fluid: Recommended a fluid restriction to ~6 cups/day, 1500 mL or ~50 oz. Suggested other alternatives such as ice chips, iced fruits, and sugar-free mint, gum or hard candy.    Scale: Daily Calendar to track weight was given to patient. Explained the importance of weighing oneself and recording. If patient gains more than 3 lbs in a day or 5 lbs in a week, advised patient to call doctor.     I left patient with my contact information for any further questions.     MEDICATION     Medication List  • ascorbic acid  500 mg oral q AM   • calcium carbonate  500 mg oral BID   • clopidogreL  75 mg oral q AM   • cyanocobalamin  500 mcg oral q AM   • [Provider Managed Hold] escitalopram  5 mg oral q AM   • famotidine  10 mg oral Daily   • [Provider Managed Hold] furosemide  40 mg intravenous BID (am, 4p)   • latanoprost  1 drop Left Eye Nightly   • levothyroxine  112 mcg oral Daily (6:30a)   • loteprednol  1 drop Left Eye TID   • magnesium oxide  400 mg oral BID   • melatonin  3 mg oral Nightly   • metoprolol tartrate  6.25 mg oral BID   • [Provider Managed Hold] mirtazapine  15 mg oral Nightly   • multivitamin  1 tablet oral q AM   •  nystatin   Topical BID   • pantoprazole  20 mg oral Daily   • polyethylene glycol  17 g oral Daily   • thiamine  100 mg oral q AM   • tobramycin  1 drop Left Eye Daily   • [START ON 12/8/2023] torsemide  20 mg oral Daily   • [Provider Managed Hold] valsartan  20 mg oral Daily     •  trimethobenzamide        Michelle Mendez, PharmD, Transitions of Care Clinical Pharmacist    [place]: [phone]  Office: [phone]  (Time Spent: 25 minutes)  12/7/2023

## 2023-12-07 NOTE — PLAN OF CARE
Care Coordination Discharge Plan Summary    Admission Assessment Summary    General Information  Readmission Within the last 30 days: no previous admission in last 30 days  Does patient have a : No  Patient-Specific Goals (include timeframe):      Living Arrangements  Arrived From: home  Current Living Arrangements: home  People in Home: alone (w 24/7 A)  Home Accessibility: ramp to enter home  Living Arrangement Comments: pt lives in a 2 story home, has a first floor set up, ramp to enter from front door, pt has a 24/7 private pay aide    Social Determinants of Health - Screenings  Housing Stability  In the last 12 months, was there a time when you were not able to pay the mortgage or rent on time?: No  In the last 12 months, was there a time when you did not have a steady place to sleep or slept in a shelter (including now)?: No  Utility Access  In the past 12 months has the electric, gas, oil, or water company threatened to shut off services in your home?: No  Transportation Needs  In the past 12 months, has lack of transportation kept you from medical appointments or from getting medications?: No  In the past 12 months, has lack of transportation kept you from meetings, work, or from getting things needed for daily living?: No    Functional Status Prior to Admission  Assistive Device/Animal Currently Used at Home: wheelchair, scale, walker, standard  Functional Status Comments:    IADL Comments:      Discharge Needs Assessment    Concerns to be Addressed: care coordination/care conferences, discharge planning  Current Discharge Risk: chronically ill  Anticipated Changes Related to Illness: none    Discharge Plan Summary    Patient Choice  Offered/Gave Vendor List: yes  Patient's Choice of Community Agency(s): [facility]  Patient and/or patient guardian/advocate was made aware of their right to choose a provider. A list of eligible providers was presented and reviewed with the patient and/or patient  guardian/advocate in written and/or verbal form. The list delineates providers in the patient’s desired geographic area who are participating in the Medicare program and/or providers contracted with the patient’s primary insurance. The Medicare list and quality ratings were obtained from the Medicare.gov [medicare.gov] website.    Concerns / Comments: Per DPR, the patient planned for discharge today. [facility] updated. Patient has transportation arranged, son will provide. Spoke to the patient and his son Marko and they are agreeable to the dispo plan. CC will continue to follow for transition of care needs until discharge is complete.    Discharge Plan:  Disposition/Destination: Home Health Care - [facility] / Home       Connection to Community     Community Resources:      Discharge Transportation:  Is Out of Hospital DNR needed at Discharge:    Does patient need discharge transport? No

## 2023-12-07 NOTE — PLAN OF CARE
Plan of Care Review  Plan of Care Reviewed With: patient  Progress: improving  Outcome Evaluation: Pt AAOx4, VSS, denies pain overnight. Up and OOB with assistx1 and walker. Urinal and call bell within reach. Pln for d/c this afternoon

## 2023-12-07 NOTE — PROGRESS NOTES
12/07/23 1455   Hospital to Home   Patient Enrolled in [facility]? Yes   [facility] Coordinator Comment Spoke w/ pt's son (Marko) to discuss Hospital to Home program. He is agreeable to a phone visit. Telemed appt. with [facility] provider scheduled for Tuesday, Dec. 12, 2023 at 1:30pm. He declined assistance making other follow-up appt's.

## 2023-12-07 NOTE — PLAN OF CARE
Plan of Care Review  Plan of Care Reviewed With: patient  Progress: improving  Outcome Evaluation: Pt AAOX4, VSS, calm and cooperative w/ care. Meds given as prescribed. Pt discharged at 3 pm today, picked up by son Ethan, went over AVS with pt and son. Belongings checked and are with pt, IVs taken out, took off monitor.

## 2023-12-08 ENCOUNTER — PATIENT OUTREACH (OUTPATIENT)
Dept: CASE MANAGEMENT | Facility: CLINIC | Age: 88
End: 2023-12-08
Payer: MEDICARE

## 2023-12-08 ASSESSMENT — ENCOUNTER SYMPTOMS
DIARRHEA: 0
ABDOMINAL PAIN: 0
APPETITE CHANGE: 0
VOMITING: 0
SHORTNESS OF BREATH: 0
NAUSEA: 0
COUGH: 1
LIGHT-HEADEDNESS: 0
DIZZINESS: 0
DIFFICULTY URINATING: 0

## 2023-12-08 NOTE — PROGRESS NOTES
NAME: Samy Elena    MRN: [id number]    YOB: 1930    Event Review:    Initial TCM Patient Outreach Date: 12/08/23    Assessment completed with: Paid Caregiver  Patient stated reason for hospitalization: coughing up fluid  Discharge Diagnosis: Pleural effusion on left    Patient readmitted in the last 30 days: (!) Yes  Discharging Facility: [facility]  Date of Last Admission: 11/29/23  Date of Last Discharge: 12/07/23  Patient's perception of their health status since discharge: Improving    HPI:  Spoke with pt's caregiver Melinda who states she is able to speak on pt's behalf, pt presented to [facility] emergency on 11/29/2023 with coughing and fluid in lungs. Pt had chest x-ray which showed left pleural effusion.  Pt had thoracentesis twice with 1.2 L and 900 cc removed respectively. Pt symptomatic with cough only.  Pt seen by GI and x-ray showed stool burden without obstruction.  Pt placed on bowel regimen.  Pt seen by Cardiology and medication adjustment made.  Pt improved and was discharged to home with services on 12/7/2023.    Spoke with caregiver Melinda today, pt lives alone in 2 story home with all needs on 1st floor.  Pt has 24/7 care.  Pt's son Marko schedules appointments and takes pt to appointments.  Pt is currently at eye doctor with son.  Pt has walker and can ambulate with assistance only.    Pt with no complaints of chest pain, shortness of breath, dizziness, nausea, vomiting or diarrhea.  Pt has colostomy and wears diapers for urinary incontinence.    Pt's weight today was 154.  Caregiver states that he has a little bit of a cough this morning but much improved.  Pt with no noted peripheral edema.  CM reviewed low sodium diet as well as fluid restriction.  Caregiver verbalizes understanding.    CM left voicemail message for pt's son regarding Hospital to home program and requested call back.  CM checking on follow-up appointment status.    Hospital to Home program  reviewed with pt.  Pt reminded of upcoming appointment with nurse practitioner on 12/12/2023.    CM to follow-up with pt weekly.  Pt given CM contact information.    Review of Systems   Constitutional: Negative for appetite change.   Respiratory: Positive for cough (slight). Negative for shortness of breath.    Cardiovascular: Negative for chest pain and leg swelling.   Gastrointestinal: Negative for abdominal pain, diarrhea, nausea and vomiting.        Pt with colostomy   Genitourinary: Negative for difficulty urinating.        Pt incontinent     Neurological: Negative for dizziness and light-headedness.       Medication Review:    Medication Review: Yes     Reported by: Paid Caregiver  Any new medications prescribed at discharge?: Yes  Is the patient having any side effects they believe may be caused by any medication additions or changes?: No  New prescriptions filled?: Yes     Do you have enough of your regularly prescribed medications?: Yes       Medication adherence problem?: No  Was a medication discrepancy indentified?: No       Nursing Interventions: No intervention needed  Reconciled the current and discharge medications: Yes  Reviewed AVS (Discharge Instructions)?: Yes     Acute Pain:    Acute pain: No   Chronic Pain:    Chronic pain: No   Diet/Nutrition:    Type of Diet: Low sodium, Fluid Restricted  Diet Adherence: Adherent with diet (1500 cc)     Home Care Services:    Home Care Agency: [facility]  Type of Home Care Services: Home Nursing, Home PT, Home OT        Post-Discharge Durable Medical Equipment::    Durable Medical Equipment: Front wheeled walker  Does patient's condition require a scale?: Yes  Working scale at home?: Yes  Oxygen Use: No     Home Management:    Living Arrangement: Alone  Support System:: Paid Caregiver  Type of Residence: 2 story house (All needs on 1st floor)  Home Monitoring: Weight  Any patient reported falls in the last 3 months?: No  Jewish or spiritual beliefs that impact  treatment?: No    Appointment Scheduling:    PCP appointment scheduled: No      Patient Scheduling Dispositions: Pt will call office to schedule     Follow-Ups:       Relevant Specialist Follow-ups: Dr. Langston  To be schedule   Dr. Pete  (eye doctor)  12/8/2023    Interventions/ Care Coordination:    Interventions/ Care Coordination: Encouraged patient to call PCP/Specialist, Encouraged patient to schedule with the PCP/Specialist  General Education: CHF, Respiratory precautions (flu, colds, pneumonia, COVID-19), Falls/Home safety       Reviewed signs/symptoms of worsening condition or complication that necessitate a call to the Physician's office.  Educated patient on access to care.  RN phone number given for future care management.    Safia Vickers RN BSN   [phone]

## 2023-12-12 ENCOUNTER — TELEMEDICINE (OUTPATIENT)
Dept: HOSPITALIST | Facility: CLINIC | Age: 88
End: 2023-12-12
Payer: MEDICARE

## 2023-12-12 DIAGNOSIS — J90 PLEURAL EFFUSION, RIGHT: ICD-10-CM

## 2023-12-12 DIAGNOSIS — N18.30 STAGE 3 CHRONIC KIDNEY DISEASE, UNSPECIFIED WHETHER STAGE 3A OR 3B CKD (CMS/HCC): ICD-10-CM

## 2023-12-12 DIAGNOSIS — R94.31 ABNORMAL EKG: ICD-10-CM

## 2023-12-12 DIAGNOSIS — I50.23 SYSTOLIC CHF, ACUTE ON CHRONIC (CMS/HCC): Primary | ICD-10-CM

## 2023-12-12 DIAGNOSIS — I25.5 ISCHEMIC CARDIOMYOPATHY: ICD-10-CM

## 2023-12-12 NOTE — PATIENT INSTRUCTIONS
Daily weights    Low sodium, 1.5 L FR diet     Weight on  lbs, weight on 12/11 150 lbs    Remain OFF Lexapro     [facility] to follow, for remote tele monitoring as per his cardiology team    Contact cardiology with any 3 lb weight gain OR at the onset of any CHF symptoms     Needs OP follow up with PCP

## 2023-12-12 NOTE — PROGRESS NOTES
Hospital to Home Program Visit       Hospital to Home Program Visit:  Audio Only Encounter       DISCHARGE INFORMATION   Assessment completed with:  (on Marko [phone])  Discharge Diagnosis: CHF  Discharging Facility: [facility]  Date of Last Discharge: 12/07/23  Discharge Disposition: Home  Patient's perception of their health status since discharge: Same    PCP: Zachery Hernandez MD       HPI / OVERVIEW OF VISIT      Samy Elena is a chronically ill 89 y.o. male with a PMH of HTN, CHF, Afib, CKD III, CABG, ICM, severe TR,  prolonged QTC and recurrent pleural effusions. He has had multiple presentations to [facility] for CHF and pleural effusion. He presented to [facility] on 11/29 with complaints of progressive SOB, notes he was recently treated for  OP UTI. In the ER, patient appeared volume overloaded, CXR showed L pleural effusion.Cardiology consulted.  Patient responded to diuresis and required L thoracentesis x 2 (1.2 L/900 cc). Toprol added given history of ICM, unable to tolerate Diovan given soft BP trends. Lexapro DC secondary to prolonged QTC. He was DC to home with close OP follow up.        A tele health visit was conducted today with son Marko providing the bulk of the information ([phone]). Since patient's return to home on 12/7, he has remained stable. No complaints of SOB, denies any edema. Lives at home with the help of aides. Aware of the need for low sodium, 1.5 L fluid restricted diet. [facility] is following, last set of VS as below:  T 97.4   HR 55   RR 18  118/64  98% RA  150 lbs    Weight on  lbs. To start remote tele monitoring, to be managed by Dr Jeffrey. Would contact his service with any 3 lb weight gain OR at the onset of any CHF symptoms.       DC MAR reviewed. Remains OFF Lexapro. No need for med renewals or lab draws at this time.       What would cause you to go back to the hospital?     Decompensated CHF or return of pleural effusion     Who would you call if  you had a problem?     Contact cardiology with any 3 lb weight gain or at the ONSET of any CHF symptoms    Overall, how was your care that was provided and do you have any suggestions for improvement?     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   • Necrotizing soft tissue infection   • Shock (CMS/[facility])   • Palliative care by specialist   • Debility   • Hyperkalemia   • Cellulitis and necrosis of penis   • Parastomal hernia   • Acute on chronic systolic (congestive) heart failure (CMS/[facility])   • Fall   • Coronary artery disease   • Respiratory failure with hypoxia (CMS/[facility])   • CALI (acute kidney injury) (CMS/[facility])   • 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[facility])   • Hypertension   • Pleural effusion on left   • Atrial fibrillation (CMS/[facility])   • Elevated troponin   • UTI (urinary tract infection)   • Orthostatic hypotension   • Atrial fibrillation (CMS/[facility])   • Hyperlipidemia   • Hypothyroidism   • Depression   • Cough   • UTI (urinary tract infection)   • CKD (chronic kidney disease) stage 3, GFR 30-59 ml/min (CMS/[facility])   • Pressure ulcer         MEDICATIONS      Reconciled the current and discharge medications: Yes      Current Outpatient Medications:   •  ascorbic acid (VITAMIN C) 500 mg tablet, Take 500 mg by mouth every morning., Disp: , Rfl:   •  bimatoprost (LUMIGAN) 0.01 % ophthalmic drops, Administer 1 drop into the left eye nightly., Disp: , Rfl:   •  calcium carbonate (TUMS) 200 mg calcium (500 mg) chewable tablet, Take 1 tablet by mouth 2 (two) times a day., Disp: , Rfl:   •  clopidogreL (PLAVIX) 75 mg tablet, Take 75 mg by mouth every morning., Disp: , Rfl:   •  cyanocobalamin (VITAMIN B12) 500 mcg  tablet, Take 500 mcg by mouth every morning., Disp: , Rfl:   •  famotidine (PEPCID) 10 mg tablet, Take 1 tablet (10 mg total) by mouth daily Indications: gastroesophageal reflux disease., Disp: 30 tablet, Rfl: 0  •  levothyroxine (SYNTHROID) 112 mcg tablet, Take 1 tablet (112 mcg total) by mouth daily., Disp: 30 tablet, Rfl: 0  •  loteprednol etabonate (LOTEMAX) 0.5 % ointment, Apply 1 Application to left eye 3 (three) times a day., Disp: , Rfl:   •  magnesium oxide (MAG-OX) 400 mg (241.3 mg magnesium) tablet, Take 400 mg by mouth 2 (two) times a day., Disp: , Rfl:   •  melatonin 3 mg tablet, Take 3 mg by mouth nightly., Disp: , Rfl:   •  metoprolol succinate XL (TOPROL-XL) 25 mg 24 hr tablet, Take 0.5 tablets (12.5 mg total) by mouth daily., Disp: 15 tablet, Rfl: 0  •  multivitamin tablet, Take 1 tablet by mouth every morning., Disp: , Rfl:   •  nystatin (MYCOSTATIN) 100,000 unit/gram powder, Apply topically 2 (two) times a day., Disp: 30 g, Rfl: 1  •  omeprazole OTC (PriLOSEC OTC) 20 mg EC tablet, Take 20 mg by mouth daily before breakfast., Disp: , Rfl:   •  polyethylene glycol (MIRALAX) 17 gram packet, Take 17 g by mouth daily for 3 days., Disp: 3 packet, Rfl: 0  •  potassium chloride (KLOR-CON) 10 mEq CR tablet, Take 20 mEq by mouth 2 (two) times a day., Disp: , Rfl:   •  thiamine 50 mg tablet, Take 50 mg by mouth daily., Disp: , Rfl:   •  tobramycin (TOBREX) 0.3 % ophthalmic solution, Administer 1 drop into the left eye daily., Disp: , Rfl:   •  torsemide (DEMADEX) 20 mg tablet, Take 1 tablet (20 mg total) by mouth daily., Disp: 30 tablet, Rfl: 0    I am having Smay FARHATYared Quilesca maintain his clopidogreL, potassium chloride, magnesium oxide, multivitamin, ascorbic acid, calcium carbonate, cyanocobalamin, omeprazole OTC, melatonin, thiamine, LOTEMAX, tobramycin, LUMIGAN, famotidine, nystatin, polyethylene glycol, levothyroxine, torsemide, and metoprolol succinate XL.    REVIEW OF SYSTEMS      All other systems  reviewed and negative except as noted in HPI.      POST DISCHARGE MEDICAL EQUIPMENT      DME Interventions: No intervention required  Oxygen Use: No     Has all Durable Medical Equipment (DME) been delivered?: Other (NA)    FOLLOW-UP APPOINTMENTS      Appointment Provider: Please schedule follow up appts with Dr Hernandez and Dr Jeffrey       INTERVENTIONS   Interventions needed: updated medication listing, communicated with another interdisciplinary provider            PATIENT INSTRUCTIONS      Patient Instructions   Daily weights    Low sodium, 1.5 L FR diet     Weight on  lbs, weight on 12/11 150 lbs    Remain OFF Lexapro     [facility] to follow, for remote tele monitoring as per his cardiology team    Contact cardiology with any 3 lb weight gain OR at the onset of any CHF symptoms     Needs OP follow up with PCP              HEIDI Stone  12/12/2023

## 2023-12-15 ENCOUNTER — PATIENT OUTREACH (OUTPATIENT)
Dept: CASE MANAGEMENT | Facility: CLINIC | Age: 88
End: 2023-12-15
Payer: MEDICARE

## 2023-12-15 NOTE — PROGRESS NOTES
H2H follow-up call.    CM left voicemail message requesting call back with update in status.    Safia Vickers RN BSN   [phone]

## 2023-12-27 ENCOUNTER — PATIENT OUTREACH (OUTPATIENT)
Dept: CASE MANAGEMENT | Facility: CLINIC | Age: 88
End: 2023-12-27
Payer: MEDICARE

## 2023-12-27 NOTE — PROGRESS NOTES
H follow-up call.    Spoke with Melinda, caregiver with permission from pt's son.  Pt doing good today.  Pt seen by Cardiology and pt still has a little fluid on left side of lung as per Cardiologist.  Pt taking dieretic and following fluid restriction.  Pt with no complaints of shortness of breath or lower extremity edema.  Pt's weight today is 154.    Pt taking all medications as prescribed.  Pt following medical regimen.    Follow-up in one week.    Safia Vickers RN BSN   [phone]

## 2023-12-28 LAB — FUNGUS SPEC CULT: NORMAL

## 2024-01-05 ENCOUNTER — PATIENT OUTREACH (OUTPATIENT)
Dept: CASE MANAGEMENT | Facility: CLINIC | Age: 89
End: 2024-01-05
Payer: MEDICARE

## 2024-01-05 NOTE — PROGRESS NOTES
H2H follow-up call.    Pt states he is doing okay.  Pt with no complaints of shortness of breath, chest pain or cough.  Pt states he has low energy.    Pt has caregiver who takes care of his medications and appointment.    Hospital to home program complete.  Closed to Care Management.    Safia Vickers RN BSN   [phone]

## 2024-01-10 LAB — MYCOBACTERIUM SPEC CULT: NORMAL

## 2024-02-19 ENCOUNTER — OFFICE VISIT (OUTPATIENT)
Dept: SURGERY | Facility: CLINIC | Age: 89
End: 2024-02-19
Payer: MEDICARE

## 2024-02-19 VITALS
DIASTOLIC BLOOD PRESSURE: 53 MMHG | WEIGHT: 157 LBS | SYSTOLIC BLOOD PRESSURE: 93 MMHG | HEART RATE: 62 BPM | TEMPERATURE: 98.7 F | OXYGEN SATURATION: 98 % | HEIGHT: 73 IN | BODY MASS INDEX: 20.81 KG/M2

## 2024-02-19 DIAGNOSIS — Z93.3 COLOSTOMY IN PLACE (CMS/HCC): Primary | ICD-10-CM

## 2024-02-19 DIAGNOSIS — N49.3 FOURNIER'S GANGRENE IN MALE: ICD-10-CM

## 2024-02-19 PROCEDURE — 99213 OFFICE O/P EST LOW 20 MIN: CPT | Mod: 25 | Performed by: COLON & RECTAL SURGERY

## 2024-02-19 PROCEDURE — 17250 CHEM CAUT OF GRANLTJ TISSUE: CPT | Performed by: COLON & RECTAL SURGERY

## 2024-02-19 RX ORDER — MIRTAZAPINE 15 MG/1
15 TABLET, FILM COATED ORAL
COMMUNITY
Start: 2024-01-26 | End: 2024-03-26

## 2024-02-19 RX ORDER — ERYTHROMYCIN 5 MG/G
OINTMENT OPHTHALMIC
COMMUNITY
Start: 2024-01-26 | End: 2024-03-26

## 2024-02-19 RX ORDER — SULFAMETHOXAZOLE AND TRIMETHOPRIM 800; 160 MG/1; MG/1
1 TABLET ORAL 2 TIMES DAILY
COMMUNITY
Start: 2024-01-19 | End: 2024-03-26

## 2024-02-19 RX ORDER — METOPROLOL TARTRATE 25 MG/1
TABLET, FILM COATED ORAL
COMMUNITY
Start: 2023-12-07 | End: 2024-03-26

## 2024-02-19 RX ORDER — ESCITALOPRAM OXALATE 10 MG/1
10 TABLET ORAL EVERY MORNING
COMMUNITY
End: 2025-02-08 | Stop reason: HOSPADM

## 2024-02-19 NOTE — ASSESSMENT & PLAN NOTE
His wound almost healed completely.  There was note of granulation tissue that was treated today and he should be fine.

## 2024-02-19 NOTE — LETTER
Dear Dr. Hernandez,    I saw Samy Elena in the office today in follow-up. Please see my note below.    If you have any additional questions, please do not hesitate to call me.    Sincerely,    Jose F Bryant MD      _____________________________________      Colorectal Surgery Follow-up    Subjective     Samy Elena is a 89 y.o. male who returns in follow-up for his colostomy and perianal wound.     He was admitted to the hospital on 8/2/2022 for necrotizing fasciitis of the buttock.  He saw me in the office from the [facility] for what was thought to be a pilonidal cyst.  He was recovering from a recent hospitalization for change in mental status, hypotension, and hypoperfusion.  The patient has multiple medical issues including congestive heart failure with low ejection fraction, history of MI, chronic atrial fibrillation.     The patient was found to have severe necrotizing soft tissue infection of the left buttock into the ischial anal space and tracking to the perineal body and was taken the operating room on 8/2 for debridement of this area there was tracking into the perineum and to the base of the scrotum but no involvement of the urethra.  The patient was taken back to the operating room on 8/3 for further debridement and had a fairly healthy wound at that time without any additional necrotizing fasciitis.  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    He returns in routine follow-up for his colostomy.  He is not having any anal discomfort.  He has urinary incontinence for which he wears a diaper.  He has not noticed any bleeding from the anus.  He was concerned about some discoloration of the skin around the colostomy.  He he changes the stoma bag every 3 to 5 days with the help of a full-time aide he has at home.  He gets some occasional leakage from the stoma but overall is doing well with it.       Medical History:   Past Medical  "History:   Diagnosis Date   • 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   • Atrial fibrillation (CMS/HCC)    • CHF (congestive heart failure) (CMS/HCC)    • 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)    • Coronary artery disease    • Disease of thyroid gland    • Will catheter in place    • GI (gastrointestinal bleed)    • Hypertension    • Myocardial infarction (CMS/HCC)    • Orthostatic hypotension    • TIA (transient ischemic attack)        Surgical History:   Past Surgical History:   Procedure Laterality Date   • CHOLECYSTECTOMY     • CORONARY ARTERY BYPASS GRAFT     • JOINT REPLACEMENT Left     Knee   • THYROIDECTOMY         Allergies: Jardiance [empagliflozin]    Current Medications:  •  calcium carbonate  •  clopidogreL  •  cyanocobalamin  •  escitalopram  •  famotidine  •  levothyroxine  •  magnesium oxide  •  melatonin  •  metoprolol succinate XL  •  omeprazole OTC  •  potassium chloride  •  thiamine  •  torsemide  •  ascorbic acid  •  LUMIGAN  •  erythromycin  •  LOTEMAX  •  metoprolol tartrate  •  mirtazapine  •  multivitamin  •  polyethylene glycol  •  sulfamethoxazole-trimethoprim  •  tobramycin    Objective     Physicial Exam  Visit Vitals  BP (!) 93/53 (BP Location: Left upper arm, Patient Position: Sitting)   Pulse 62   Temp 37.1 °C (98.7 °F) (Oral)   Ht 1.854 m (6' 1\")   Wt 71.2 kg (157 lb)   SpO2 98%   BMI 20.71 kg/m²       Physical Exam  Constitutional:       Comments: Frail.  Walks with significant assistance.   Cardiovascular:      Rate and Rhythm: Normal rate and regular rhythm.   Pulmonary:      Breath sounds: Normal breath sounds.   Abdominal:      General: There is no distension.      Palpations: Abdomen is soft. There is no mass.      Tenderness: There is no abdominal tenderness.      Comments: The colostomy is healthy in the left abdomen.  There is some slight darkening of the skin around the stoma but no sign of ulceration or peristomal skin irritation.  The colostomy protrudes beautifully " and is healthy appearing.  There are some nodules on the mucosa that are either inverted diverticula or some inflammatory nodules but nothing that looks concerning.  He has a small peristomal hernia.   Genitourinary:     Comments: The skin around the anus has healed beautifully but there was an area of fleshy granulation tissue just adjacent to the anus which was quite small but protruding and this was cauterized with silver nitrate.            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  There was note of granulation tissue that was treated today and he should be fine.         Relevant Medications    sulfamethoxazole-trimethoprim (BACTRIM DS) 800-160 mg per tablet       Other    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[facility]) - Primary    Current Assessment & Plan     Except for the small parastomal hernia, his colostomy is perfect and he is doing well.  I reassured him that the skin changes are normal.  He will continue with his colostomy care.  I will plan to see him back in 1 year.    He did ask about surgery for reanastomosis.  I certainly would not want to take that on given his medical issues and overall frailty.  His son certainly agrees.                 Mat Bryant MD

## 2024-02-19 NOTE — ASSESSMENT & PLAN NOTE
Except for the small parastomal hernia, his colostomy is perfect and he is doing well.  I reassured him that the skin changes are normal.  He will continue with his colostomy care.  I will plan to see him back in 1 year.    He did ask about surgery for reanastomosis.  I certainly would not want to take that on given his medical issues and overall frailty.  His son certainly agrees.

## 2024-02-19 NOTE — PROGRESS NOTES
Colorectal Surgery Follow-up    Subjective     Samy Elena is a 89 y.o. male who returns in follow-up for his colostomy and perianal wound.     He was admitted to the hospital on 8/2/2022 for necrotizing fasciitis of the buttock.  He saw me in the office from the [facility] for what was thought to be a pilonidal cyst.  He was recovering from a recent hospitalization for change in mental status, hypotension, and hypoperfusion.  The patient has multiple medical issues including congestive heart failure with low ejection fraction, history of MI, chronic atrial fibrillation.     The patient was found to have severe necrotizing soft tissue infection of the left buttock into the ischial anal space and tracking to the perineal body and was taken the operating room on 8/2 for debridement of this area there was tracking into the perineum and to the base of the scrotum but no involvement of the urethra.  The patient was taken back to the operating room on 8/3 for further debridement and had a fairly healthy wound at that time without any additional necrotizing fasciitis.  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    He returns in routine follow-up for his colostomy.  He is not having any anal discomfort.  He has urinary incontinence for which he wears a diaper.  He has not noticed any bleeding from the anus.  He was concerned about some discoloration of the skin around the colostomy.  He he changes the stoma bag every 3 to 5 days with the help of a full-time aide he has at home.  He gets some occasional leakage from the stoma but overall is doing well with it.       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)    • Coronary artery disease    • Disease of thyroid gland    • Will catheter  "in place    • GI (gastrointestinal bleed)    • Hypertension    • Myocardial infarction (CMS/HCC)    • Orthostatic hypotension    • TIA (transient ischemic attack)        Surgical History:   Past Surgical History:   Procedure Laterality Date   • CHOLECYSTECTOMY     • CORONARY ARTERY BYPASS GRAFT     • JOINT REPLACEMENT Left     Knee   • THYROIDECTOMY         Allergies: Jardiance [empagliflozin]    Current Medications:  •  calcium carbonate  •  clopidogreL  •  cyanocobalamin  •  escitalopram  •  famotidine  •  levothyroxine  •  magnesium oxide  •  melatonin  •  metoprolol succinate XL  •  omeprazole OTC  •  potassium chloride  •  thiamine  •  torsemide  •  ascorbic acid  •  LUMIGAN  •  erythromycin  •  LOTEMAX  •  metoprolol tartrate  •  mirtazapine  •  multivitamin  •  polyethylene glycol  •  sulfamethoxazole-trimethoprim  •  tobramycin    Objective     Physicial Exam  Visit Vitals  BP (!) 93/53 (BP Location: Left upper arm, Patient Position: Sitting)   Pulse 62   Temp 37.1 °C (98.7 °F) (Oral)   Ht 1.854 m (6' 1\")   Wt 71.2 kg (157 lb)   SpO2 98%   BMI 20.71 kg/m²       Physical Exam  Constitutional:       Comments: Frail.  Walks with significant assistance.   Cardiovascular:      Rate and Rhythm: Normal rate and regular rhythm.   Pulmonary:      Breath sounds: Normal breath sounds.   Abdominal:      General: There is no distension.      Palpations: Abdomen is soft. There is no mass.      Tenderness: There is no abdominal tenderness.      Comments: The colostomy is healthy in the left abdomen.  There is some slight darkening of the skin around the stoma but no sign of ulceration or peristomal skin irritation.  The colostomy protrudes beautifully and is healthy appearing.  There are some nodules on the mucosa that are either inverted diverticula or some inflammatory nodules but nothing that looks concerning.  He has a small peristomal hernia.   Genitourinary:     Comments: The skin around the anus has healed " beautifully but there was an area of fleshy granulation tissue just adjacent to the anus which was quite small but protruding and this was cauterized with silver nitrate.            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  There was note of granulation tissue that was treated today and he should be fine.         Relevant Medications    sulfamethoxazole-trimethoprim (BACTRIM DS) 800-160 mg per tablet       Other    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[facility]) - Primary    Current Assessment & Plan     Except for the small parastomal hernia, his colostomy is perfect and he is doing well.  I reassured him that the skin changes are normal.  He will continue with his colostomy care.  I will plan to see him back in 1 year.    He did ask about surgery for reanastomosis.  I certainly would not want to take that on given his medical issues and overall frailty.  His son certainly agrees.                  Mat Bryant MD

## 2024-02-25 NOTE — PROGRESS NOTES
Physical medicine and rehabilitation progress note    Discussed with medicine team and therapy.  Patient continues to have difficulty with standing and pivoting.  Difficulty with bearing weight on the right leg and complained of hip pain.  Recommend CT of the right hip and x-ray of the right knee to rule out any more extensive fracture or occult fracture.    CT scan shows nondisplaced fracture of the right inferior pubic ramus and right acetabulum with extension of the fracture line superiorly into the iliac bone with a positive effusion    X-ray of the knee is without any evidence of fracture    Patient presently is weight-bear as tolerated.  I would recommend having orthopedics come back and evaluate the patient with an acetabular fracture to see if weightbearing status needs to change or if we need to just limit mobility to transfers at this time.   Duncan Damico

## 2024-03-25 ENCOUNTER — HOSPITAL ENCOUNTER (INPATIENT)
Facility: HOSPITAL | Age: 89
LOS: 5 days | Discharge: HOME HEALTH | DRG: 291 | End: 2024-03-30
Attending: EMERGENCY MEDICINE | Admitting: STUDENT IN AN ORGANIZED HEALTH CARE EDUCATION/TRAINING PROGRAM
Payer: MEDICARE

## 2024-03-25 ENCOUNTER — APPOINTMENT (EMERGENCY)
Dept: RADIOLOGY | Facility: HOSPITAL | Age: 89
DRG: 291 | End: 2024-03-25
Payer: MEDICARE

## 2024-03-25 DIAGNOSIS — J90 PLEURAL EFFUSION: ICD-10-CM

## 2024-03-25 DIAGNOSIS — I50.9 ACUTE CONGESTIVE HEART FAILURE, UNSPECIFIED HEART FAILURE TYPE (CMS/HCC): Primary | ICD-10-CM

## 2024-03-25 LAB
ALBUMIN SERPL-MCNC: 3.4 G/DL (ref 3.5–5.7)
ALP SERPL-CCNC: 129 IU/L (ref 34–125)
ALT SERPL-CCNC: 7 IU/L (ref 7–52)
ANION GAP SERPL CALC-SCNC: 7 MEQ/L (ref 3–15)
AST SERPL-CCNC: 49 IU/L (ref 13–39)
BASOPHILS # BLD: 0.04 K/UL (ref 0.01–0.1)
BASOPHILS NFR BLD: 0.7 %
BILIRUB SERPL-MCNC: 1 MG/DL (ref 0.3–1.2)
BNP SERPL-MCNC: 826 PG/ML
BUN SERPL-MCNC: 24 MG/DL (ref 7–25)
CALCIUM SERPL-MCNC: 8.7 MG/DL (ref 8.6–10.3)
CHLORIDE SERPL-SCNC: 106 MEQ/L (ref 98–107)
CO2 SERPL-SCNC: 25 MEQ/L (ref 21–31)
CREAT SERPL-MCNC: 1.5 MG/DL (ref 0.7–1.3)
DIFFERENTIAL METHOD BLD: ABNORMAL
EGFRCR SERPLBLD CKD-EPI 2021: 43.4 ML/MIN/1.73M*2
EOSINOPHIL # BLD: 0.07 K/UL (ref 0.04–0.54)
EOSINOPHIL NFR BLD: 1.3 %
ERYTHROCYTE [DISTWIDTH] IN BLOOD BY AUTOMATED COUNT: 17.3 % (ref 11.6–14.4)
FLUAV RNA SPEC QL NAA+PROBE: NEGATIVE
FLUBV RNA SPEC QL NAA+PROBE: NEGATIVE
GLUCOSE BLD-MCNC: 120 MG/DL (ref 70–99)
GLUCOSE SERPL-MCNC: 101 MG/DL (ref 70–99)
HCT VFR BLD AUTO: 32.6 % (ref 40.1–51)
HGB BLD-MCNC: 10.5 G/DL (ref 13.7–17.5)
IMM GRANULOCYTES # BLD AUTO: 0.02 K/UL (ref 0–0.08)
IMM GRANULOCYTES NFR BLD AUTO: 0.4 %
LACTATE SERPL-SCNC: 2 MMOL/L (ref 0.4–2)
LYMPHOCYTES # BLD: 0.53 K/UL (ref 1.2–3.5)
LYMPHOCYTES NFR BLD: 9.8 %
MAGNESIUM SERPL-MCNC: 2 MG/DL (ref 1.8–2.5)
MCH RBC QN AUTO: 33.2 PG (ref 28–33.2)
MCHC RBC AUTO-ENTMCNC: 32.2 G/DL (ref 32.2–36.5)
MCV RBC AUTO: 103.2 FL (ref 83–98)
MONOCYTES # BLD: 0.68 K/UL (ref 0.3–1)
MONOCYTES NFR BLD: 12.5 %
NEUTROPHILS # BLD: 4.08 K/UL (ref 1.7–7)
NEUTS SEG NFR BLD: 75.3 %
NRBC BLD-RTO: 0 %
PDW BLD AUTO: 11 FL (ref 9.4–12.4)
PLATELET # BLD AUTO: 111 K/UL (ref 150–350)
POCT TEST: ABNORMAL
POTASSIUM SERPL-SCNC: 5 MEQ/L (ref 3.5–5.1)
PROT SERPL-MCNC: 8.6 G/DL (ref 6–8.2)
RBC # BLD AUTO: 3.16 M/UL (ref 4.5–5.8)
RSV RNA SPEC QL NAA+PROBE: NEGATIVE
SARS-COV-2 RNA RESP QL NAA+PROBE: NEGATIVE
SODIUM SERPL-SCNC: 138 MEQ/L (ref 136–145)
TROPONIN I SERPL HS-MCNC: 38.5 PG/ML
WBC # BLD AUTO: 5.42 K/UL (ref 3.8–10.5)

## 2024-03-25 PROCEDURE — 83880 ASSAY OF NATRIURETIC PEPTIDE: CPT

## 2024-03-25 PROCEDURE — 85025 COMPLETE CBC W/AUTO DIFF WBC: CPT

## 2024-03-25 PROCEDURE — 80053 COMPREHEN METABOLIC PANEL: CPT

## 2024-03-25 PROCEDURE — 83605 ASSAY OF LACTIC ACID: CPT

## 2024-03-25 PROCEDURE — 83735 ASSAY OF MAGNESIUM: CPT

## 2024-03-25 PROCEDURE — 93005 ELECTROCARDIOGRAM TRACING: CPT

## 2024-03-25 PROCEDURE — 87637 SARSCOV2&INF A&B&RSV AMP PRB: CPT

## 2024-03-25 PROCEDURE — 99285 EMERGENCY DEPT VISIT HI MDM: CPT | Mod: 25

## 2024-03-25 PROCEDURE — 87040 BLOOD CULTURE FOR BACTERIA: CPT | Mod: 91

## 2024-03-25 PROCEDURE — 36415 COLL VENOUS BLD VENIPUNCTURE: CPT

## 2024-03-25 PROCEDURE — 63600000 HC DRUGS/DETAIL CODE: Mod: JZ

## 2024-03-25 PROCEDURE — 84484 ASSAY OF TROPONIN QUANT: CPT

## 2024-03-25 PROCEDURE — 99221 1ST HOSP IP/OBS SF/LOW 40: CPT | Performed by: STUDENT IN AN ORGANIZED HEALTH CARE EDUCATION/TRAINING PROGRAM

## 2024-03-25 PROCEDURE — 93005 ELECTROCARDIOGRAM TRACING: CPT | Performed by: STUDENT IN AN ORGANIZED HEALTH CARE EDUCATION/TRAINING PROGRAM

## 2024-03-25 PROCEDURE — 71045 X-RAY EXAM CHEST 1 VIEW: CPT

## 2024-03-25 PROCEDURE — 12000000 HC ROOM AND CARE MED/SURG

## 2024-03-25 RX ORDER — MIRTAZAPINE 15 MG/1
15 TABLET, FILM COATED ORAL NIGHTLY
Status: DISCONTINUED | OUTPATIENT
Start: 2024-03-26 | End: 2024-03-30 | Stop reason: HOSPADM

## 2024-03-25 RX ORDER — ESCITALOPRAM OXALATE 5 MG/1
5 TABLET ORAL DAILY
Status: DISCONTINUED | OUTPATIENT
Start: 2024-03-26 | End: 2024-03-30 | Stop reason: HOSPADM

## 2024-03-25 RX ORDER — CLOPIDOGREL BISULFATE 75 MG/1
75 TABLET ORAL EVERY MORNING
Status: DISCONTINUED | OUTPATIENT
Start: 2024-03-26 | End: 2024-03-30 | Stop reason: HOSPADM

## 2024-03-25 RX ORDER — PANTOPRAZOLE SODIUM 20 MG/1
20 TABLET, DELAYED RELEASE ORAL DAILY
Status: DISCONTINUED | OUTPATIENT
Start: 2024-03-26 | End: 2024-03-30 | Stop reason: HOSPADM

## 2024-03-25 RX ORDER — LATANOPROST 50 UG/ML
1 SOLUTION/ DROPS OPHTHALMIC NIGHTLY
Status: DISCONTINUED | OUTPATIENT
Start: 2024-03-25 | End: 2024-03-30 | Stop reason: HOSPADM

## 2024-03-25 RX ORDER — ASCORBIC ACID 250 MG
500 TABLET ORAL EVERY MORNING
Status: DISCONTINUED | OUTPATIENT
Start: 2024-03-26 | End: 2024-03-30 | Stop reason: HOSPADM

## 2024-03-25 RX ORDER — PNV NO.95/FERROUS FUM/FOLIC AC 28MG-0.8MG
500 TABLET ORAL EVERY MORNING
Status: DISCONTINUED | OUTPATIENT
Start: 2024-03-26 | End: 2024-03-30 | Stop reason: HOSPADM

## 2024-03-25 RX ORDER — FUROSEMIDE 10 MG/ML
40 INJECTION INTRAMUSCULAR; INTRAVENOUS ONCE
Status: COMPLETED | OUTPATIENT
Start: 2024-03-25 | End: 2024-03-25

## 2024-03-25 RX ADMIN — FUROSEMIDE 40 MG: 10 INJECTION, SOLUTION INTRAMUSCULAR; INTRAVENOUS at 23:44

## 2024-03-26 PROBLEM — R69 MULTIPLE MEDICAL PROBLEMS: Status: ACTIVE | Noted: 2024-03-26

## 2024-03-26 PROBLEM — J69.0 ASPIRATION PNEUMONIA (CMS/HCC): Status: ACTIVE | Noted: 2024-03-26

## 2024-03-26 LAB
ALBUMIN SERPL-MCNC: 3.1 G/DL (ref 3.5–5.7)
ALP SERPL-CCNC: 117 IU/L (ref 34–125)
ALT SERPL-CCNC: 6 IU/L (ref 7–52)
ANION GAP SERPL CALC-SCNC: 7 MEQ/L (ref 3–15)
AST SERPL-CCNC: 34 IU/L (ref 13–39)
BACTERIA URNS QL MICRO: ABNORMAL /HPF
BASOPHILS # BLD: 0.03 K/UL (ref 0.01–0.1)
BASOPHILS NFR BLD: 0.7 %
BILIRUB SERPL-MCNC: 0.8 MG/DL (ref 0.3–1.2)
BILIRUB UR QL STRIP.AUTO: NEGATIVE MG/DL
BUN SERPL-MCNC: 25 MG/DL (ref 7–25)
CALCIUM SERPL-MCNC: 8.3 MG/DL (ref 8.6–10.3)
CHLORIDE SERPL-SCNC: 106 MEQ/L (ref 98–107)
CLARITY UR REFRACT.AUTO: CLEAR
CO2 SERPL-SCNC: 27 MEQ/L (ref 21–31)
COLOR UR AUTO: YELLOW
CREAT SERPL-MCNC: 1.6 MG/DL (ref 0.7–1.3)
DIFFERENTIAL METHOD BLD: ABNORMAL
EGFRCR SERPLBLD CKD-EPI 2021: 40.2 ML/MIN/1.73M*2
EOSINOPHIL # BLD: 0.08 K/UL (ref 0.04–0.54)
EOSINOPHIL NFR BLD: 1.9 %
ERYTHROCYTE [DISTWIDTH] IN BLOOD BY AUTOMATED COUNT: 17.5 % (ref 11.6–14.4)
GLUCOSE BLD-MCNC: 143 MG/DL (ref 70–99)
GLUCOSE BLD-MCNC: 143 MG/DL (ref 70–99)
GLUCOSE SERPL-MCNC: 125 MG/DL (ref 70–99)
GLUCOSE UR STRIP.AUTO-MCNC: NEGATIVE MG/DL
HCT VFR BLD AUTO: 31.1 % (ref 40.1–51)
HGB BLD-MCNC: 9.7 G/DL (ref 13.7–17.5)
HGB UR QL STRIP.AUTO: 1
HYALINE CASTS #/AREA URNS LPF: ABNORMAL /LPF
IMM GRANULOCYTES # BLD AUTO: 0.01 K/UL (ref 0–0.08)
IMM GRANULOCYTES NFR BLD AUTO: 0.2 %
KETONES UR STRIP.AUTO-MCNC: NEGATIVE MG/DL
LEUKOCYTE ESTERASE UR QL STRIP.AUTO: NEGATIVE
LYMPHOCYTES # BLD: 0.75 K/UL (ref 1.2–3.5)
LYMPHOCYTES NFR BLD: 17.6 %
MCH RBC QN AUTO: 32.8 PG (ref 28–33.2)
MCHC RBC AUTO-ENTMCNC: 31.2 G/DL (ref 32.2–36.5)
MCV RBC AUTO: 105.1 FL (ref 83–98)
MONOCYTES # BLD: 0.66 K/UL (ref 0.3–1)
MONOCYTES NFR BLD: 15.5 %
MUCOUS THREADS URNS QL MICRO: ABNORMAL /LPF
NEUTROPHILS # BLD: 2.73 K/UL (ref 1.7–7)
NEUTS SEG NFR BLD: 64.1 %
NITRITE UR QL STRIP.AUTO: NEGATIVE
NRBC BLD-RTO: 0 %
PDW BLD AUTO: 11.4 FL (ref 9.4–12.4)
PH UR STRIP.AUTO: 7 [PH]
PLATELET # BLD AUTO: 116 K/UL (ref 150–350)
POCT TEST: ABNORMAL
POCT TEST: ABNORMAL
POTASSIUM SERPL-SCNC: 3.5 MEQ/L (ref 3.5–5.1)
PROT SERPL-MCNC: 7.8 G/DL (ref 6–8.2)
PROT UR QL STRIP.AUTO: ABNORMAL
QRS DURATION: 154
QRS DURATION: 164
QT INTERVAL: 440
QT INTERVAL: 446
QTC CALCULATION(BAZETT): 501
QTC CALCULATION(BAZETT): 523
R AXIS: 55
R AXIS: 61
RBC # BLD AUTO: 2.96 M/UL (ref 4.5–5.8)
RBC #/AREA URNS HPF: ABNORMAL /HPF
SODIUM SERPL-SCNC: 140 MEQ/L (ref 136–145)
SP GR UR REFRACT.AUTO: 1.01
SQUAMOUS URNS QL MICRO: ABNORMAL /HPF
T WAVE AXIS: -19
T WAVE AXIS: -23
T4 FREE SERPL-MCNC: 1.11 NG/DL (ref 0.58–1.64)
TROPONIN I SERPL HS-MCNC: 41.7 PG/ML
TSH SERPL DL<=0.05 MIU/L-ACNC: 0.03 MIU/L (ref 0.34–5.6)
UROBILINOGEN UR STRIP-ACNC: 0.2 EU/DL
VENTRICULAR RATE: 76
VENTRICULAR RATE: 85
WBC # BLD AUTO: 4.26 K/UL (ref 3.8–10.5)
WBC #/AREA URNS HPF: ABNORMAL /HPF

## 2024-03-26 PROCEDURE — 87086 URINE CULTURE/COLONY COUNT: CPT

## 2024-03-26 PROCEDURE — 21400000 HC ROOM AND CARE CCU/INTERMEDIATE

## 2024-03-26 PROCEDURE — 93010 ELECTROCARDIOGRAM REPORT: CPT | Mod: 76 | Performed by: INTERNAL MEDICINE

## 2024-03-26 PROCEDURE — 63700000 HC SELF-ADMINISTRABLE DRUG: Performed by: STUDENT IN AN ORGANIZED HEALTH CARE EDUCATION/TRAINING PROGRAM

## 2024-03-26 PROCEDURE — 84484 ASSAY OF TROPONIN QUANT: CPT

## 2024-03-26 PROCEDURE — 84443 ASSAY THYROID STIM HORMONE: CPT

## 2024-03-26 PROCEDURE — 63700000 HC SELF-ADMINISTRABLE DRUG

## 2024-03-26 PROCEDURE — 81003 URINALYSIS AUTO W/O SCOPE: CPT

## 2024-03-26 PROCEDURE — 84439 ASSAY OF FREE THYROXINE: CPT

## 2024-03-26 PROCEDURE — 36415 COLL VENOUS BLD VENIPUNCTURE: CPT

## 2024-03-26 PROCEDURE — 80053 COMPREHEN METABOLIC PANEL: CPT | Performed by: STUDENT IN AN ORGANIZED HEALTH CARE EDUCATION/TRAINING PROGRAM

## 2024-03-26 PROCEDURE — 92610 EVALUATE SWALLOWING FUNCTION: CPT | Mod: GN

## 2024-03-26 PROCEDURE — 99233 SBSQ HOSP IP/OBS HIGH 50: CPT | Performed by: INTERNAL MEDICINE

## 2024-03-26 PROCEDURE — 63600000 HC DRUGS/DETAIL CODE: Mod: JZ | Performed by: STUDENT IN AN ORGANIZED HEALTH CARE EDUCATION/TRAINING PROGRAM

## 2024-03-26 PROCEDURE — 93010 ELECTROCARDIOGRAM REPORT: CPT | Performed by: INTERNAL MEDICINE

## 2024-03-26 PROCEDURE — 25800000 HC PHARMACY IV SOLUTIONS: Performed by: STUDENT IN AN ORGANIZED HEALTH CARE EDUCATION/TRAINING PROGRAM

## 2024-03-26 PROCEDURE — 85025 COMPLETE CBC W/AUTO DIFF WBC: CPT | Performed by: STUDENT IN AN ORGANIZED HEALTH CARE EDUCATION/TRAINING PROGRAM

## 2024-03-26 RX ORDER — THIAMINE HCL 100 MG
50 TABLET ORAL DAILY
Status: DISCONTINUED | OUTPATIENT
Start: 2024-03-26 | End: 2024-03-30 | Stop reason: HOSPADM

## 2024-03-26 RX ORDER — ENOXAPARIN SODIUM 100 MG/ML
40 INJECTION SUBCUTANEOUS
Status: DISCONTINUED | OUTPATIENT
Start: 2024-03-26 | End: 2024-03-30 | Stop reason: HOSPADM

## 2024-03-26 RX ORDER — FAMOTIDINE 10 MG/1
10 TABLET ORAL DAILY
Status: DISCONTINUED | OUTPATIENT
Start: 2024-03-26 | End: 2024-03-30 | Stop reason: HOSPADM

## 2024-03-26 RX ORDER — LIDOCAINE 560 MG/1
1 PATCH PERCUTANEOUS; TOPICAL; TRANSDERMAL DAILY PRN
COMMUNITY
End: 2024-10-15

## 2024-03-26 RX ORDER — DEXTROSE 40 %
15-30 GEL (GRAM) ORAL AS NEEDED
Status: DISCONTINUED | OUTPATIENT
Start: 2024-03-26 | End: 2024-03-30 | Stop reason: HOSPADM

## 2024-03-26 RX ORDER — GUAIFENESIN 600 MG/1
600 TABLET, EXTENDED RELEASE ORAL 2 TIMES DAILY
Status: DISCONTINUED | OUTPATIENT
Start: 2024-03-26 | End: 2024-03-30 | Stop reason: HOSPADM

## 2024-03-26 RX ORDER — LEVOTHYROXINE SODIUM 112 UG/1
112 TABLET ORAL
Status: DISCONTINUED | OUTPATIENT
Start: 2024-03-27 | End: 2024-03-30 | Stop reason: HOSPADM

## 2024-03-26 RX ORDER — TORSEMIDE 10 MG/1
20 TABLET ORAL 2 TIMES DAILY
COMMUNITY
End: 2024-08-01 | Stop reason: HOSPADM

## 2024-03-26 RX ORDER — IBUPROFEN 200 MG
16-32 TABLET ORAL AS NEEDED
Status: DISCONTINUED | OUTPATIENT
Start: 2024-03-26 | End: 2024-03-30 | Stop reason: HOSPADM

## 2024-03-26 RX ORDER — FUROSEMIDE 10 MG/ML
40 INJECTION INTRAMUSCULAR; INTRAVENOUS DAILY
Status: DISCONTINUED | OUTPATIENT
Start: 2024-03-26 | End: 2024-03-30 | Stop reason: HOSPADM

## 2024-03-26 RX ORDER — DEXTROSE 50 % IN WATER (D50W) INTRAVENOUS SYRINGE
25 AS NEEDED
Status: DISCONTINUED | OUTPATIENT
Start: 2024-03-26 | End: 2024-03-30 | Stop reason: HOSPADM

## 2024-03-26 RX ORDER — METHENAMINE HIPPURATE 1000 MG/1
1 TABLET ORAL
COMMUNITY

## 2024-03-26 RX ADMIN — GUAIFENESIN 600 MG: 600 TABLET ORAL at 20:26

## 2024-03-26 RX ADMIN — THIAMINE HCL TAB 100 MG 50 MG: 100 TAB at 15:55

## 2024-03-26 RX ADMIN — CEFTRIAXONE SODIUM 1 G: 1 INJECTION, POWDER, FOR SOLUTION INTRAMUSCULAR; INTRAVENOUS at 01:51

## 2024-03-26 RX ADMIN — ESCITALOPRAM OXALATE 5 MG: 5 TABLET, FILM COATED ORAL at 09:26

## 2024-03-26 RX ADMIN — ENOXAPARIN SODIUM 40 MG: 40 INJECTION SUBCUTANEOUS at 17:25

## 2024-03-26 RX ADMIN — FUROSEMIDE 40 MG: 10 INJECTION, SOLUTION INTRAMUSCULAR; INTRAVENOUS at 09:40

## 2024-03-26 RX ADMIN — Medication 500 MG: at 09:26

## 2024-03-26 RX ADMIN — CLOPIDOGREL 75 MG: 75 TABLET ORAL at 09:26

## 2024-03-26 RX ADMIN — PANTOPRAZOLE SODIUM 20 MG: 20 TABLET, DELAYED RELEASE ORAL at 09:27

## 2024-03-26 RX ADMIN — GUAIFENESIN 600 MG: 600 TABLET ORAL at 09:27

## 2024-03-26 RX ADMIN — METOPROLOL TARTRATE 6.25 MG: 25 TABLET, FILM COATED ORAL at 02:28

## 2024-03-26 RX ADMIN — METOPROLOL TARTRATE 6.25 MG: 25 TABLET, FILM COATED ORAL at 20:26

## 2024-03-26 RX ADMIN — Medication 500 MCG: at 09:25

## 2024-03-26 RX ADMIN — LATANOPROST 1 DROP: 50 SOLUTION/ DROPS OPHTHALMIC at 01:51

## 2024-03-26 RX ADMIN — MIRTAZAPINE 15 MG: 15 TABLET, FILM COATED ORAL at 21:59

## 2024-03-26 RX ADMIN — FAMOTIDINE 10 MG: 20 TABLET, FILM COATED ORAL at 15:55

## 2024-03-26 ASSESSMENT — COGNITIVE AND FUNCTIONAL STATUS - GENERAL
UNDERSTANDING 10 TO 15 MIN SPEECH: 3 - A LITTLE
WALKING IN HOSPITAL ROOM: 1 - TOTAL
REMEMBERING 5 ERRANDS WITH NO LIST: 2 - A LOT
TAKING CARE OF COMPLICATED TASKS: 2 - A LOT
REMEMBERING TO TAKE MEDICATION: 2 - A LOT
REMEMBERING WHERE THINGS ARE: 2 - A LOT
CLIMB 3 TO 5 STEPS WITH RAILING: 1 - TOTAL
MOVING TO AND FROM BED TO CHAIR: 1 - TOTAL
FOLLOWS FAMILIAR CONVERSATION: 4 - NONE
STANDING UP FROM CHAIR USING ARMS: 1 - TOTAL

## 2024-03-26 ASSESSMENT — ENCOUNTER SYMPTOMS
DIFFICULTY URINATING: 0
SPEECH DIFFICULTY: 0
LIGHT-HEADEDNESS: 0
FATIGUE: 1
PALPITATIONS: 0
CONFUSION: 0
VOMITING: 0
HEADACHES: 0
SHORTNESS OF BREATH: 1
NAUSEA: 0
WEAKNESS: 1
DIZZINESS: 0
DYSURIA: 0
FEVER: 0
DIAPHORESIS: 0
ACTIVITY CHANGE: 0
ARTHRALGIAS: 0
COUGH: 0
ABDOMINAL PAIN: 0
COLOR CHANGE: 0
MYALGIAS: 0

## 2024-03-26 NOTE — PROGRESS NOTES
Patient:  Samy Elena  Location:  [facility] ED50  MRN:  [id number]  Today's date:  3/26/2024    SLP Diagnosis  Known history of mild oropharyngeal dysphagia. Concerns fo CHF vs. PNA this admission. Patient declined repeat VFSS.    Swallowing Recommendations   SLP Swallowing Recommendations - 03/26/24 0945     Diet Consistency Recommendations thin liquids;regular     Medication Administration whole with liquid;one at a time     Supervision Level for Intake distant supervision needed     Feeding/Delivery Recommendations allow patient to feed self if maintaining safety;small bites/sips;single cup sips only;stop meal if showing signs of aspiration or fatigue (e.g., coughing, wet voice)     Posture Recommendations fully upright in chair/chair mode of bed     Swallowing Strategies alternate food and liquid intake     Instrumental Assessment Recommendations reassess via non-instrumental clinical swallow evaluation;VFSS (videofluroscopic swallowing study)     Recommend VFSS (Comment) Discussed option to repeat VFSS testing with patient given ? pneumonia. He politely declined a this time.     Oral Hygiene twice daily teeth brushing     Progonosis for Return to Safe Oral Intake fair;anticipate dysphagia will be a chronic issue     Comment, Swallowing Recommendations Maintain general aspiration precautions and oral care                 Summary/Handoff  Initial clinical swallowing evaluation completed. 89yoM admitted with dyspnea and concerns for worsening CHF. Chest imaging with moderate bilateral pleural effusions. He has a history of mild oropharyngeal dysphagia requiring diet modification. Most recent VFSS 1/2023 with recommendations for soft and bite sized 6 diet with mildly thick liquids and eventual advancement to regular diet and thin liquids using strict single sips via cup. Clinical swallowing presentation was not immediately c/f aspiration. No overt s/s aspiration across  trials and patient resting comfortably on room air. WBC WNL. SLP discussed option to repeat video swallow testing given some c/f PNA; however, pt politely declined. As it seems CHF is higher on differential, suggest continuing regular diet with thin liquids as tolerated. Meds one at a time with sip of liquid vs. whole in puree. Strict aspiration precautions & small single sips. Could consider VFSS later in admission if patient amenable. SLP to follow.    Active Diet Orders (From admission, onward)     Start     Ordered    03/26/24 0029  Adult Diet Regular; 2000 mL Fluid; Cardiac (Low Sodium/Low Fat); RD/LDN may adjust order  Diet effective now        Question Answer Comment   Diet Texture Regular    Fluid restriction dietary / 24h: 2000 mL Fluid    Other Restriction(s): Cardiac (Low Sodium/Low Fat)    Delegation of Authority. Diet orders written by PA/CRRonald may not be adjusted by RD/LDNs. RD/LDN may adjust order        03/26/24 0028                Samy is a 89 y.o. male admitted on 3/25/2024 with Acute congestive heart failure, unspecified heart failure type (CMS/HCC) [I50.9]. Principal problem is Acute congestive heart failure, unspecified heart failure type (CMS/HCC).    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    History of Present Illness  89 y/o male who presented with dyspnea. His nurse aide notes that at times he chokes on food.       CXR with large left and moderate right-sided effusions and pulmonary vascular congestion. Worsening CHF      SLP Pain    Date/Time Pain Type Rating: Rest Rating: Activity [facility]   03/26/24 1211 Pain  Assessment 0 - no pain 0 - no pain ESL            Prior Level of Function    Flowsheet Row Most Recent Value   Communication understands/communicates without difficulty   Swallowing difficulty swallowing liquids/foods   Baseline Diet/Method of Nutritional Intake thin liquids, regular  [per patient report]   Past History of Dysphagia Patient reports currently consuming a regular diet with thin liquids at home. Last seen by SLP services 12/2023 cleared for easy to chew 7 diet with thin liquids. Most recent VFSS  1/5/2023 with recommendations for soft and bite sized diet with mildly thick liquids, and advancement to thin liquids using strict single sips as able.           SLP Evaluation and Treatment - 03/26/24 0945        SLP Time Calculation    Start Time 0945     Stop Time 0955     Time Calculation (min) 10 min        General Information    Document Type Initial Evaluation     Mode of Treatment individual therapy;speech language pathology     Position at Start of Session upright;in bed     Status at Start of Session agreeable to therapy;no issues or concerns identified by nurse prior to session     General Observations of Patient Patient alert and pleasant. Seated upright in bed upon SLP arrival.        Precautions/Limitations/Impairments    Existing Precautions/Restrictions aspiration;fall     Limitations/Impairments safety/cognitive;swallowing         Services    Do You Speak a Language Other Than English at Home? no        Cognition/Psychosocial    Comment, Cognition Patient AAOx3. speech fluent and errorless. able to follow simple commands. adequate insight into current hospitalization. Pleasant and cooperative.        Dentition (Oral Motor)    Dentition (Oral Motor) adequate dentition        Facial Symmetry (Oral Motor)    Facial Symmetry (Oral Motor) WNL        Lip Function (Oral Motor)    Lip Range of Motion (Oral Motor) WNL     Lip Strength (Oral Motor) WNL     Lip Sensitivity (Oral Motor) intact         Tongue Function (Oral Motor)    Tongue ROM (Oral Motor) WNL     Tongue Strength (Oral Motor) WFL     Tongue Coordination/Speed (Oral Motor) WNL     Tongue Sensitivity (Oral Motor) intact        Jaw Function (Oral Motor)    Jaw Function (Oral Motor) WNL        Cough/Swallow/Gag Reflex (Oral Motor)    Soft Palate/Velum (Oral Motor) WNL     Gag Reflex (Oral Motor) not tested     Volitional Throat Clear/Cough (Oral Motor) WNL     Volitional Swallow (Oral Motor) WNL        Vocal Quality/Secretion Management (Oral Motor)    Vocal Quality (Oral Motor) WNL     Secretion Management (Oral Motor) WNL        GRBAS    Grade 1-->slight abnormality     Roughness 1-->slight abnormality     Breathiness 0-->none     Asthenia 0-->none     Strain 0-->none        Pragmatic Language    Nonverbal Skills (Pragmatic Language) WFL        Functional Communication Measures    FCM: Swallowing 6-->Level 6        General Swallowing Observations    Current Diet/Method of Nutritional Intake thin liquids;regular     Signs/Symptoms of Aspiration (Current Diet) other (see comments)   CXR: left and right pleural effusions. Worsening CHF.    Respiratory Support (General Swallowing Observations) none     Comment, Secretions/Suctioning unremarkable        Food and Liquid Trials (NIS)    Patient Positioning HOB elevated (specify degrees)     Oral Intake/Feeding Performance independent/appropriate self-feeding skills     Liquid Consistencies Evaluated thin liquids     Thin Liquids sips from cup;straw sips     Food Consistencies Evaluated pureed (PU4);regular     Pureed (PU4) WFL     Regular WFL     Comment, Oral Phase Patient self-fed with set up. No anterior bolus loss. Adequate bolus transfer. No oral residuals across consistencies. Mastication timely.     Comment, Pharyngeal Phase No overt s/s aspiration with thin liquids via cup or straw. History of sensate penetration/aspiration. Patient denied difficulties.     Esophageal Phase of Swallow no  clinical symptoms     SpO2 Level stable on room air        Swallowing Recommendations    Diet Consistency Recommendations thin liquids;regular     Medication Administration whole with liquid;one at a time     Supervision Level for Intake distant supervision needed     Feeding/Delivery Recommendations allow patient to feed self if maintaining safety;small bites/sips;single cup sips only;stop meal if showing signs of aspiration or fatigue (e.g., coughing, wet voice)     Posture Recommendations fully upright in chair/chair mode of bed     Swallowing Strategies alternate food and liquid intake     Instrumental Assessment Recommendations reassess via non-instrumental clinical swallow evaluation;VFSS (videofluroscopic swallowing study)     Recommend VFSS (Comment) Discussed option to repeat VFSS testing with patient given ? pneumonia. He politely declined a this time.     Oral Hygiene twice daily teeth brushing     Progonosis for Return to Safe Oral Intake fair;anticipate dysphagia will be a chronic issue     Comment, Swallowing Recommendations Maintain general aspiration precautions and oral care        Swallowing Intervention    Dysphagia/Swallowing Interventions monitor tolerance of;current diet without evidence of aspiration        AM-PAC™ - Cognition (Current Function)    Following/understanding a 10-15 minute speech or presentation? 3 - A little     Understanding familiar people during ordinary conversations? 4 - None     Remembering to take medications at the appropriate time? 2 - A lot     Remembering where things were placed or put away? 2 - A lot     Remembering a list of 3 or 4 errands without writing it down? 2 - A lot     Taking care of complicated tasks? 2 - A lot     AM-PAC™ Cognition Score 15        SLP Goals    Swallowing Goal Selection oral nutrition/hydration, SLP Goal 1        Session Outcome    Position at End of Session upright;in bed     Status at End of Session call light in reach;all needs met      Nursing Notified patient's performance;patient's response to therapy/activity        Plan    Rehab Potential good, to achieve stated therapy goals     Therapy Frequency 4 times/wk     Planned Therapy Interventions dysphagia therapy;instrumental swallow assessment;patient/family education               SLP Discharge Recommendations    Flowsheet Row Most Recent Value   SLP Recommended Discharge Disposition skilled nursing facility, home with assistance at 03/26/2024 0945               Education Documentation  Signs/Symptoms, taught by Patience Kohler CCC-SLP at 3/26/2024 11:03 AM.  Learner: Patient  Readiness: Acceptance  Method: Explanation  Response: Verbalizes Understanding  Comment: aspiration precautions and risk factors    Risk Factors, taught by Patience Kohler CCC-SLP at 3/26/2024 11:03 AM.  Learner: Patient  Readiness: Acceptance  Method: Explanation  Response: Verbalizes Understanding  Comment: aspiration precautions and risk factors          SLP Goals    Flowsheet Row Most Recent Value   Oral Nutrition/Hydration Goal 1    Activity effective/safe, oral nutrition/hydration, with caregiver assist at 03/26/2024 0945   Time Frame long-term goal (LTG), by discharge at 03/26/2024 0945   Progress/Outcome goal ongoing at 03/26/2024 0945

## 2024-03-26 NOTE — ASSESSMENT & PLAN NOTE
#hypothyroidism  - continue home levothroxine  -check TSH    #depression  -continue home SSRI (escitalopram)    #bacteruria   - reportedly diagnosed with UTI outpatient but not yet prescribed abx?   -UA unremarkable here  -treating emprically for PNA with Rocephen which would cover common urinary bacteria   -monitor for sx and continue abx for now

## 2024-03-26 NOTE — PLAN OF CARE
Problem: Adult Inpatient Plan of Care  Goal: Plan of Care Review  Outcome: Progressing  Flowsheets (Taken 3/26/2024 1101)  Progress: improving  Outcome Evaluation: SLP jovanni completed. Recommend continue regular diet with thin liquids. Meds one at a time vs. whole in puree. Patient politely declined VFSS. Consider later in admission if amentable.  Plan of Care Reviewed With: patient

## 2024-03-26 NOTE — ASSESSMENT & PLAN NOTE
Presentation: SOLIS, fatigue, abd swelling, and elevated BNP  Last TTE (8/2023): Estimated EF 45 to 50 %. D shaped left ventricular cavity in the setting of severe RVoverload Unable to assess diastolic function due to atrial arrhythmia. The right ventricular cavity is severely dilated with severely decreased systolic function. Nunes's sign is present. Unchanged from prior one from 8/2022.    Cardiologist: Dr. Jeffrey.   Home diuretic: torsemide 10 mg PO BID  Other GDMT: Metoprolol (specify home dose seeing fills for succinate and tartrate), history of Justin's gangrene with SGLT2i use. Entresto last filled 5/30/2023 (seems to be on ARB alone, likely due to hypotension)    PLAN:  -CXR ordered for 3/26 AM, consider thoracentesis if pleural effusion still present   -Mild troponin elevation likely in the setting of demand ischemia due to heart failure exacerbation. STAT EKG and trops for chest pain   -Lasix 40mg IV daily for now, consider re-dose in afternoon to achieve net 2L negative daily   -CHF order set: strict I/Os, daily standing weights, cardiac diet, 2g Na, 2L fluids, daily electrolytes with replete for goal K >4.0 and Mg >2.0, continuous telemetry with reassess need daily  -check iron panel for completeness   -not hypoxic, monitor with routine vital signs, goal >92%  -check TTE, last was 7 months ago. Worsening valvular dysfunction would not change mgmt as not a surgical candidate  -confirm functional status, PT/OT to aid in dispo

## 2024-03-26 NOTE — ASSESSMENT & PLAN NOTE
Would diurese for 48hrs and re-image, if still present could consider CT chest or go straight to thoracentesis to sample fluid

## 2024-03-26 NOTE — ASSESSMENT & PLAN NOTE
Continue metoprolol  He is off any anticoagulation due to his GI bleed   Did not want to pursue NISH closure device in the past and is rate controlled now

## 2024-03-26 NOTE — PROGRESS NOTES
Spoke with Marko patient's son over the phone and confirmed with medication list from [facility] and/or patient's own pharmacy to complete the medication reconciliation.     Prior to admission medication list:    Current Outpatient Medications:     clopidogreL (PLAVIX) 75 mg tablet, Take 75 mg by mouth every morning.    bimatoprost (LUMIGAN) 0.01 % ophthalmic drops, Administer 1 drop into the left eye nightly.    calcium carbonate (TUMS) 200 mg calcium (500 mg) chewable tablet, Take 1 tablet by mouth 2 (two) times a day.    cyanocobalamin (VITAMIN B12) 500 mcg tablet, Take 500 mcg by mouth every morning.    escitalopram (LEXAPRO) 5 mg tablet, Take 5 mg by mouth daily.    famotidine (PEPCID) 10 mg tablet, Take 1 tablet (10 mg total) by mouth daily Indications: gastroesophageal reflux disease.    levothyroxine (SYNTHROID) 112 mcg tablet, Take 1 tablet (112 mcg total) by mouth daily.    lidocaine (ASPERCREME) 4 % adhesive patch,medicated topical patch, Apply 1 patch topically daily as needed. Remove & discard patch within 12 hours or as directed by prescriber.    loteprednol etabonate (LOTEMAX) 0.5 % ointment, Apply 1 Application to left eye 3 (three) times a day.    magnesium oxide (MAG-OX) 400 mg (241.3 mg magnesium) tablet, Take 400 mg by mouth 2 (two) times a day.    melatonin 3 mg tablet, Take 3 mg by mouth nightly.    methenamine (HIPREX) 1 gram tablet, Take 1 g by mouth daily.    metoprolol succinate XL (TOPROL-XL) 25 mg 24 hr tablet, Take 0.5 tablets (12.5 mg total) by mouth daily.    omeprazole OTC (PriLOSEC OTC) 20 mg EC tablet, Take 20 mg by mouth daily before breakfast.    potassium chloride (KLOR-CON) 10 mEq CR tablet, Take 20 mEq by mouth 2 (two) times a day.    thiamine 50 mg tablet, Take 50 mg by mouth daily.    torsemide (DEMADEX) 10 mg tablet, Take 10 mg by mouth daily.    Comments about home medications:  - Patient's son stated his father does not take ascorbic acid, mirtazapine, multivitamin,  Entresto, or nystatin powder.  - Patient's son stated his father takes metoprolol succinate 12.5 mg daily not metoprolol tartrate.     Compliance:   - Metoprolol succinate last filled 12/07/23 for a 30 day supply.   - Lumigan last filled 1/2/24 for a 30 day supply.  - Lotemax last filled 1/26/24 for a 33 day supply.  - All other medications have recent fills, no compliance concerns.

## 2024-03-26 NOTE — PROGRESS NOTES
Hospital Medicine Service -  Daily Progress Note       SUBJECTIVE   Interval History:    Examined at the bedside boarding in the ED alongside my supervising physician.  Patient corroborates HPI, abrupt onset of feeling unwell starting Sunday along with associated subjective and objective fevers at home up to 101 °F.  Trace cough productive of clear to yellow sputum.  No blood in the sputum.  Denies chest pain, heart palpitations, dizziness, lightheadedness, nausea or vomiting, or diarrhea.  He has had recurrent history of UTIs but denies any urinary symptoms at this time.    Otherwise, denies all other review of systems.      OBJECTIVE      Vital signs in last 24 hours:  Temp:  [37.1 °C (98.7 °F)-37.2 °C (98.9 °F)] 37.2 °C (98.9 °F)  Heart Rate:  [57-84] 62  Resp:  [12-18] 16  BP: ()/(50-73) 109/59    Intake/Output Summary (Last 24 hours) at 3/26/2024 0830  Last data filed at 3/26/2024 0710  Gross per 24 hour   Intake 100 ml   Output 1300 ml   Net -1200 ml       Weights (last 5 days)       Date/Time Weight    03/25/24 22:14:37 72.5 kg (159 lb 12.8 oz)    03/25/24 2124 75.8 kg (167 lb)      Weight: usually 150 at 03/25/24 2124              PHYSICAL EXAMINATION      Physical Exam  General: Elderly appearing, no acute distress, AAO x3  HEENT: Symmetric, PERRL/EOMI, moist membranes, NCAT  Cardiovascular: Regular rate and rhythm, normal S1/S2, no murmurs, rubs, gallops, no JVD  Pulmonary: Reduced breath sounds left anterior lung zone, no wheezing, rhonchi, rales, good effort  GI: Soft, nontender, nondistended, bowel sounds normal, no organomegaly  Musculoskeletal: Normal bulk and tone, normal ROM  Extremities: Distal pulses intact, No LE edema bilaterally  Neuro: CN2-12 intact, sensation intact, wit no motor deficits  Psych: Normal mood and affect        LINES, CATHETERS, DRAINS, AIRWAYS, AND WOUNDS   Lines, Drains, and Airways:  Wounds (agree with documentation and present on admission):  Peripheral IV (Adult)  03/25/24 Anterior;Proximal;Right Forearm (Active)   Number of days: 1       External Urinary Catheter Large (Active)   Number of days: 0         Comments:      LABS / IMAGING / TELE      Labs  Results from last 7 days   Lab Units 03/26/24  0611 03/25/24  2131   WBC K/uL 4.26 5.42   HEMOGLOBIN g/dL 9.7* 10.5*   HEMATOCRIT % 31.1* 32.6*   PLATELETS K/uL 116* 111*     Results from last 7 days   Lab Units 03/26/24  0611 03/25/24  2131   SODIUM mEQ/L 140 138   POTASSIUM mEQ/L 3.5 5.0   CHLORIDE mEQ/L 106 106   CO2 mEQ/L 27 25   BUN mg/dL 25 24   CREATININE mg/dL 1.6* 1.5*   GLUCOSE mg/dL 125* 101*   CALCIUM mg/dL 8.3* 8.7         SARS-CoV-2 (COVID-19) (no units)   Date/Time Value   03/25/2024 2133 Negative       Imaging  No results found.      ECG/Telemetry  Reviewed       ASSESSMENT AND PLAN      Pleural effusion  Assessment & Plan  Would diurese for 48hrs and re-image, if still present could consider CT chest or go straight to thoracentesis to sample fluid    * Acute congestive heart failure, unspecified heart failure type (CMS/HCC)  Assessment & Plan  Presentation: SOLIS, fatigue, abd swelling, and elevated BNP  Last TTE (8/2023): Estimated EF 45 to 50 %. D shaped left ventricular cavity in the setting of severe RVoverload Unable to assess diastolic function due to atrial arrhythmia. The right ventricular cavity is severely dilated with severely decreased systolic function. Nunes's sign is present. Unchanged from prior one from 8/2022.    Cardiologist: Dr. Jeffrey.   Home diuretic: torsemide 10 mg PO BID  Other GDMT: Metoprolol (specify home dose seeing fills for succinate and tartrate), history of Justin's gangrene with SGLT2i use. Entresto last filled 5/30/2023 (seems to be on ARB alone, likely due to hypotension)    PLAN:  -Re-image in 2-3 days, consider thoracentesis if pleural effusion still present   -Mild troponin elevation likely in the setting of demand ischemia due to heart failure exacerbation. STAT  EKG and trops for chest pain   -Lasix 40mg IV daily for now, re-dose in afternoon to achieve net 2L negative daily   -CHF order set: strict I/Os, daily standing weights, cardiac diet, 2g Na, 2L fluids, daily electrolytes with replete for goal K >4.0 and Mg >2.0, continuous telemetry with reassess need daily  -check iron panel   -not hypoxic, monitor with routine vital signs, goal >92%  -check TTE, last was 7 months ago. Worsening valvular dysfunction would not change mgmt as not a surgical candidate         Aspiration pneumonia (CMS/[facility])  Assessment & Plan  Suspect CXR findings predominately relate to volume overload, however given hx of aspiration, fevers, and productive cough will plan to treat with CTX and check sputum cultures    -Follow blood cultures  -add mucolytic   -IS 10x hourly  -SLP to eval for dysphagia, appreciate recs for modified diet  -consider palliative consult seeing age of 89 years, DNR status, decompensated CHF and possible PNA   -Consider CT chest to rule out PNA     Multiple medical problems  Assessment & Plan  #hypothyroidism  - continue home levothroxine  -check TSH    #depression  -continue home SSRI (escitalopram)    #bacteruria   - reportedly diagnosed with UTI outpatient but not yet prescribed abx?   -UA unremarkable here  -treating emprically for PNA with Rocephen which would cover common urinary bacteria   -monitor for sx and continue abx for now       Coronary artery disease  Assessment & Plan  CAD post CABG in 1996    -Continue home plavix   -Presentation inconsistent with ACS, trop elevation presumbaly in the setting of ADHF  -Consider repeat TTE as per above primary problem     Atrial fibrillation (CMS/HCC)  Assessment & Plan  Continue metoprolol  He is off any anticoagulation due to his GI bleed   Did not want to pursue NISH closure device in the past and is rate controlled now          VTE Assessment: Padua    VTE Prophylaxis:  Current anticoagulants:  enoxaparin (LOVENOX)  syringe 40 mg, subcutaneous, Daily (6p)      Code Status: DNR (A.N.D.)      Estimated Discharge Date: 3/28/2024   Disposition Planning: pending course      SOPHIA Miranda  3/26/2024       NOTE:   Some or all of the note above was created with the use of dictation software, please note this dictation software can have abnormalities in its ability to transcribe. Please contact me directly for anything that seems abnormal, for clarification.

## 2024-03-26 NOTE — ED PROVIDER NOTES
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  He is presenting to the ED today via ambulance for generalized weakness, fatigue, and shortness of breath.  Patient states that all of his symptoms began today and he just feels generalized malaise.  Patient's home aide is at bedside who reports that patient was diagnosed with a UTI by his primary care provider today but has not started any antibiotics.  Also mentions warmth surrounding colostomy bag patient denies any abdominal tenderness, nausea, or vomiting.  He denies any chest pain, chest pressure, or wheezing.          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    Social History     Tobacco Use    Smoking status: Never    Smokeless tobacco: Never   Vaping Use    Vaping Use: Never used   Substance Use Topics    Alcohol use: Yes     Comment: social    Drug use: Never         Review of Systems   REVIEW OF SYSTEMS     Review of Systems   Constitutional:  Positive for fatigue. Negative for activity  change, diaphoresis and fever.        Generalized malaise   HENT: Negative.     Eyes:  Negative for visual disturbance.   Respiratory:  Positive for shortness of breath. Negative for cough.    Cardiovascular:  Negative for chest pain and palpitations.   Gastrointestinal:  Negative for abdominal pain, nausea and vomiting.   Genitourinary: Negative.  Negative for difficulty urinating and dysuria.   Musculoskeletal:  Negative for arthralgias and myalgias.   Skin:  Negative for color change and rash.   Neurological:  Positive for weakness. Negative for dizziness, syncope, speech difficulty, light-headedness and headaches.        Generalized weakness   Psychiatric/Behavioral:  Negative for confusion.          VITALS     ED Vitals      Date/Time Temp Pulse Resp BP SpO2 [facility]   03/26/24 1800 -- 73 20 128/77 94 % MW   03/26/24 1600 36.7 °C (98.1 °F) 73 21 114/61 97 % MW   03/26/24 1400 -- 75 23 122/63 97 % MW   03/26/24 1200 -- 64 16 122/57 97 % MW   03/26/24 0941 -- 69 27 103/59 96 % MW   03/26/24 0930 -- 72 35 79/51 95 % MW   03/26/24 0900 -- 73 24 112/55 95 % MW   03/26/24 0800 -- -- -- 109/59 -- DD   03/26/24 0700 -- 62 16 110/61 96 % EMG   03/26/24 0600 -- 63 18 112/57 97 % EMG   03/26/24 0500 -- 59 16 95/50 99 % EMG   03/26/24 0400 -- 57 18 110/56 98 % EMG   03/26/24 0300 -- 72 16 106/59 97 % EMG   03/26/24 0200 -- 65 14 115/72 97 % EMG   03/26/24 0100 -- 76 12 111/61 95 % EMG   03/26/24 0000 -- 81 14 136/73 96 % EMG   03/25/24 2300 -- 84 16 106/61 97 % EMG   03/25/24 2214  37.2 °C (98.9 °F) -- -- -- -- EMG   03/25/24 2200 -- 80 14 123/58 94 % EMG   03/25/24 2124 37.1 °C (98.7 °F) 75 12 139/68 -- EMG                         Physical Exam   PHYSICAL EXAM     Physical Exam  Constitutional:       General: He is not in acute distress.     Appearance: Normal appearance. He is normal weight. He is not ill-appearing, toxic-appearing or diaphoretic.   HENT:      Head: Normocephalic.      Nose: No congestion.   Eyes:       Conjunctiva/sclera: Conjunctivae normal.   Cardiovascular:      Rate and Rhythm: Normal rate and regular rhythm.   Pulmonary:      Effort: Pulmonary effort is normal.      Breath sounds: Examination of the left-middle field reveals decreased breath sounds. Examination of the left-lower field reveals decreased breath sounds. Decreased breath sounds present.   Abdominal:      Palpations: Abdomen is soft.      Tenderness: There is no abdominal tenderness.   Skin:     General: Skin is warm and dry.   Neurological:      Mental Status: He is alert and oriented to person, place, and time.   Psychiatric:         Mood and Affect: Mood normal.         Behavior: Behavior normal.           PROCEDURES     Procedures     DATA     Results       Procedure Component Value Units Date/Time    UA w/ reflex culture (ED Only) [[id number]]  (Abnormal) Collected: 03/26/24 0611    Specimen: Urine, Clean Catch Updated: 03/26/24 0709    Narrative:      The following orders were created for panel order UA w/ reflex culture (ED Only).  Procedure                               Abnormality         Status                     ---------                               -----------         ------                     UA Reflex to Culture (Ma...[[id number]]  Abnormal            Final result               UA Microscopic[[id number]]               Abnormal            Final result                 Please view results for these tests on the individual orders.    UA Microscopic [[id number]]  (Abnormal) Collected: 03/26/24 0611    Specimen: Urine, Clean Catch Updated: 03/26/24 0709     RBC, Urine 5 TO 9 /HPF      WBC, Urine 4 TO 10 /HPF      Squamous Epithelial Rare /hpf      Hyaline Cast None Seen /lpf      Bacteria, Urine Rare /HPF      Mucus Rare /LPF     UA Reflex to Culture (Macroscopic) [[id number]]  (Abnormal) Collected: 03/26/24 0611    Specimen: Urine, Clean Catch Updated: 03/26/24 0701     Color, Urine Yellow     Clarity, Urine Clear     Specific Gravity,  Urine 1.010     pH, Urine 7.0     Leukocyte Esterase Negative     Comment: Results can be falsely negative due to high specific gravity, some antibiotics, glucose >3 g/dl, or WBC other than neutrophils.        Nitrite, Urine Negative     Protein, Urine Trace     Comment: Trace False Positive Protein can be seen with alkaline or highly buffered urines or urine with high specific gravity. Suggest clinical correlation.        Glucose, Urine Negative mg/dL      Ketones, Urine Negative mg/dL      Urobilinogen, Urine 0.2 EU/dL      Bilirubin, Urine Negative mg/dL      Blood, Urine +1     Comment: The sensitivity of the occult blood test is equivalent to approximately 4 intact RBC/HPF.       [place] Draw Panel [[id number]] Collected: 03/25/24 2134    Specimen: Blood, Venous Updated: 03/26/24 0601    Narrative:      The following orders were created for panel order [place] Draw Panel.  Procedure                               Abnormality         Status                     ---------                               -----------         ------                     RAINBOW PINK[[id number]]                                     Final result               RAINBOW LT BLUE[[id number]]                                  Final result               RAINBOW LT GREEN[[id number]]                                 Final result                 Please view results for these tests on the individual orders.    RAINBOW PINK [[id number]] Collected: 03/25/24 2134    Specimen: Blood, Venous Updated: 03/26/24 0601    RAINBOW LT BLUE [[id number]] Collected: 03/25/24 2134    Specimen: Blood, Venous Updated: 03/26/24 0601    RAINBOW LT GREEN [[id number]] Collected: 03/25/24 2134    Specimen: Blood, Venous Updated: 03/26/24 0601    HS Troponin (with 2 hour reflex) [[id number]]  (Abnormal) Collected: 03/25/24 2213    Specimen: Blood, Venous Updated: 03/25/24 2319     High Sens Troponin I 38.5 pg/mL     Magnesium [[id number]]  (Normal) Collected: 03/25/24 2131    Specimen:  Blood, Venous Updated: 03/25/24 2250     Magnesium 2.0 mg/dL     SARS-COV-2 (COVID-19)/ FLU A/B, AND RSV, PCR Nasopharynx [[id number]]  (Normal) Collected: 03/25/24 2133    Specimen: Nasopharyngeal Swab from Nasopharynx Updated: 03/25/24 2246     SARS-CoV-2 (COVID-19) Negative     Influenza A Negative     Influenza B Negative     Respiratory Syncytial Virus Negative    Narrative:      Testing performed using real-time PCR for detection of COVID-19. EUA approved validation studies performed on site.     B-type natriuretic peptide [[id number]]  (Abnormal) Collected: 03/25/24 2131    Specimen: Blood, Venous Updated: 03/25/24 2235      pg/mL     Comprehensive metabolic panel [[id number]]  (Abnormal) Collected: 03/25/24 2131    Specimen: Blood, Venous Updated: 03/25/24 2211     Sodium 138 mEQ/L      Potassium 5.0 mEQ/L      Comment: Results obtained on plasma. Plasma Potassium values may be up to 0.4 mEQ/L less than serum values. The differences may be greater for patients with high platelet or white cell counts.        Chloride 106 mEQ/L      CO2 25 mEQ/L      BUN 24 mg/dL      Creatinine 1.5 mg/dL      Glucose 101 mg/dL      Calcium 8.7 mg/dL      AST (SGOT) 49 IU/L      ALT (SGPT) 7 IU/L      Alkaline Phosphatase 129 IU/L      Total Protein 8.6 g/dL      Comment: Test performed on plasma which typically contains approximately 0.4 g/dL more protein than serum.        Albumin 3.4 g/dL      Bilirubin, Total 1.0 mg/dL      eGFR 43.4 mL/min/1.73m*2      Comment: Calculation based on the Chronic Kidney Disease Epidemiology Collaboration (CKD-EPI) equation refit without adjustment for race.        Anion Gap 7 mEQ/L     Blood Culture Blood, Venous [[id number]] Collected: 03/25/24 2131    Specimen: Blood, Venous Updated: 03/25/24 2206    Blood Culture Blood, Venous [[id number]] Collected: 03/25/24 2131    Specimen: Blood, Venous Updated: 03/25/24 2205    CBC and differential [[id number]]  (Abnormal) Collected: 03/25/24 2131     Specimen: Blood, Venous Updated: 03/25/24 2148     WBC 5.42 K/uL      RBC 3.16 M/uL      Hemoglobin 10.5 g/dL      Hematocrit 32.6 %      .2 fL      MCH 33.2 pg      MCHC 32.2 g/dL      RDW 17.3 %      Platelets 111 K/uL      MPV 11.0 fL      Differential Type Auto     nRBC 0.0 %      Immature Granulocytes 0.4 %      Neutrophils 75.3 %      Lymphocytes 9.8 %      Monocytes 12.5 %      Eosinophils 1.3 %      Basophils 0.7 %      Immature Granulocytes, Absolute 0.02 K/uL      Neutrophils, Absolute 4.08 K/uL      Lymphocytes, Absolute 0.53 K/uL      Monocytes, Absolute 0.68 K/uL      Eosinophils, Absolute 0.07 K/uL      Basophils, Absolute 0.04 K/uL     Lactic acid, Venous [[id number]]  (Normal) Collected: 03/25/24 2131    Specimen: Blood, Venous Updated: 03/25/24 2143     Lactate 2.0 mmol/L             Imaging Results              X-RAY CHEST 1 VIEW (Final result)  Result time 03/26/24 08:41:47      Final result                   Impression:    IMPRESSION: Worsening CHF. Recommend follow-up radiographs or chest CT in 6-8  weeks.               Narrative:    CLINICAL HISTORY: sob    COMMENT: Frontal view of the chest obtained and compared with 6/20/2023.    LINES/TUBES: Sternotomy wires, some of which appear broken. Clips.    OTHER: Worsening large left and moderate right pleural effusions with associated  atelectasis and pulmonary edema. No definite pneumothorax. Stable  cardiomediastinal silhouette.                                      ECG 12 lead      ECG 12 lead          Scoring tools                                  ED Course & MDM   MDM / ED COURSE / CLINICAL IMPRESSION / DISPO     Medical Decision Making  Patient is presenting with shortness of breath and fatigue.  Reports generalized weakness and overall malaise.  Patient has a history of CHF (most recent EF 45 to 50% in August 2023).  Large left pleural effusion and moderate right pleural effusion appreciated on imaging likely secondary to fluid  overload from CHF.  Decreased breath sounds on exam.  No extremity swelling.  Patient's .  Patient's presentation, physical exam, and workup consistent with CHF exacerbation.  Will give 40 mg IV Lasix in the ED and admit to [facility] [place] for management of fluid overload/CHF exacerbation.    Amount and/or Complexity of Data Reviewed  External Data Reviewed: notes.     Details: Reviewed prior TTE from 2023 to determine EF.  Labs: ordered. Decision-making details documented in ED Course.  Radiology: ordered.  ECG/medicine tests: ordered.    Risk  Prescription drug management.  Decision regarding hospitalization.        ED Course as of 03/26/24 2104   Mon Mar 25, 2024   2307 BNP(!): 826 [GA]   2307 WBC: 5.42 [GA]   2329 Paging [facility] [place] to admit  [GA]      ED Course User Index  [GA] Magaly Park PA C     Clinical Impression      Acute congestive heart failure, unspecified heart failure type (CMS/HCC)     _________________       ED Disposition   Admit / Observation                       Magaly Park PA C  03/26/24 2104

## 2024-03-26 NOTE — ASSESSMENT & PLAN NOTE
Suspect CXR findings predominately relate to volume overload, however given hx of aspiration, fevers, and productive cough will plan to treat with CTX and check sputum cultures    -Follow blood cultures  -follow up sputum   -add mucolytic   -IS 10x hourly  -SLP to eval for dysphagia, appreciate recs for modified diet, cleared for reg with thins   -consider palliative consult seeing age of 89 years, DNR status, decompensated CHF and possible PNA   -Consider CT chest if decompensates or re-fevers , for now can obtain a chest xray  -continue ceftriaxone empirically seeing fevers >101 Fahrenheit at home prior to admission despite lack of fevers here and no leukocytosis

## 2024-03-26 NOTE — H&P
Hospital Medicine Service  H&P         Chief complaint: SOB       HISTORY OF PRESENT ILLNESS     Patient is a 89 y/o male w/ hx of HFrEF, AF not on AC, HTN, hx diverticulitis s/p colostomy, and recurrent pleural effusions requiring left-sided thoracentesis who presented with dyspnea.     In the ED, VSS and saturating well on RA. Labs significan for Cr 1.5 (b/l 1.4-1.7), , LA 2.0, HS trop 38.5, , WBC 5.4, Hb 10.5, COVID/flu/RSV negative.   CXR with large left and moderate right-sided effusions and pulmonary vascular congestion.   He was given IV lasix 40mg IV in ED.      He is accompanied by his nurse aide and son, he states for the last week he has had worsening dyspnea, abdominal swelling/protrusion, fatigue, cough, and fevers. His nurse aide notes that at times he chokes on food.   He drinks a lot of fluids, and they suspect this is the cause of his recurrent fluid build-up and CHF exacerbations.     Patient appears well, in NAD.        REVIEW OF SYSTEMS   A full review of systems was obtained and is negative then otherwise stated in the HPI.     PHYSICAL EXAM   Temp:  [37.1 °C (98.7 °F)-37.2 °C (98.9 °F)] 37.2 °C (98.9 °F)  Heart Rate:  [75-84] 81  Resp:  [12-16] 14  BP: (106-139)/(58-73) 136/73  SpO2:  [94 %-97 %] 96 %  Oxygen Therapy: None (Room air)  Physical Exam  Elderly appearing  male in NAD.   Neck w/ normal inspection, no JVD  Diminished left, clear right, no resp distress  RRR. Abd soft, NT/ND, colostomy in place, clean  No LE edema. Awake alert conversant. 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  Social History     Socioeconomic History    Marital status:    Tobacco Use    Smoking status: Never    Smokeless tobacco: Never   Vaping Use    Vaping Use: Never used   Substance and Sexual Activity    Alcohol use: Yes     Comment: social    Drug use: Never     Social Determinants of Health     Financial Resource Strain: Low Risk  (8/4/2023)    Overall Financial Resource Strain (CARDIA)     Difficulty of Paying Living Expenses: Not hard at all   Food Insecurity: No Food Insecurity (3/25/2024)    Hunger Vital Sign     Worried About Running Out of Food in the Last Year: Never true     Ran Out of Food in the Last Year: Never true   Transportation Needs: No Transportation Needs (12/1/2023)    PRAPARE - Transportation     Lack of Transportation (Medical): No     Lack of Transportation (Non-Medical): No   Housing Stability: Low Risk  (12/1/2023)    Housing Stability Vital Sign     Unable to Pay for Housing in the Last Year: No     Number of Places Lived in the Last Year: 1     Unstable Housing in the Last Year: No        MEDICATIONS   Home Medications:  Not in a hospital admission.  Current Medications:   ascorbic acid  500 mg oral q AM    cefTRIAXone  1 g intravenous q24h INT    clopidogreL  75 mg oral q AM    cyanocobalamin  500 mcg oral q AM    enoxaparin  40 mg subcutaneous Daily (6p)    escitalopram  5 mg oral Daily    furosemide  40 mg intravenous Daily    latanoprost  1 drop Left Eye Nightly    metoprolol tartrate  6.25 mg oral BID    mirtazapine  15 mg oral Nightly    pantoprazole  20 mg oral Daily        DIAGNOSTIC DATA   Lab  Results   Component Value Date    WBC 5.42 03/25/2024    HGB 10.5 (L) 03/25/2024     (L) 03/25/2024     03/25/2024    K 5.0 03/25/2024    CO2 25 03/25/2024    BUN 24 03/25/2024    CREATININE 1.5 (H) 03/25/2024    ANIONGAP 7 03/25/2024        ASSESSMENT AND PLAN     Patient is a 89 y/o male w/ hx of CHF, AF not on AC, and diverticulitis s/p colostomy who presented with dyspnea admitted for AECHF    * Acute congestive heart failure, unspecified heart failure type (CMS/HCC)  Assessment & Plan  SOLIS, fatigue, abd swelling, and elevated BNP  Hx of CHF  - Continue IV lasix  - Track I/O's  - Low sodium diet  - Daily weights  - Re-image in 2-3 days, consider thoracentesis if pleural effusion still present     Aspiration pneumonia (CMS/[facility])  Assessment & Plan  Suspect CXR findings predominately relate to volume overload, however given hx of aspiration, fevers, and productive cough will plan to treat with CTX and check sputum cultures  - Follow blood cultures    Pleural effusion  Assessment & Plan  Would diurese for 48hrs and re-image, if still present could consider CT chest or go straight to thoracentesis to sample fluid    Coronary artery disease  Assessment & Plan  Continue home plavix     Atrial fibrillation (CMS/[facility])  Assessment & Plan  Continue metoprolol  Not on AC      VTE PPx: Current anticoagulants:  enoxaparin (LOVENOX) syringe 40 mg, subcutaneous, Daily (6p)    BG: accu-checks TID/AC  Code status: DNR (A.N.D.)  Estimated Discharge Date: 3/28/2024  Disposition: Telemetry        Jostin LaneDO  3/26/2024

## 2024-03-26 NOTE — ASSESSMENT & PLAN NOTE
CAD post CABG in 1996    -Continue home plavix   -Presentation inconsistent with ACS, trop elevation presumbaly in the setting of ADHF  -Consider repeat TTE as per above primary problem

## 2024-03-26 NOTE — HOSPITAL COURSE
Samy is a 89 y.o. male admitted on 3/25/2024 with Acute congestive heart failure, unspecified heart failure type (CMS/HCC) [I50.9]. Principal problem is Acute congestive heart failure, unspecified heart failure type (CMS/HCC).    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    History of Present Illness  89 y/o male who presented with dyspnea. His nurse aide notes that at times he chokes on food.       CXR with large left and moderate right-sided effusions and pulmonary vascular congestion. Worsening CHF

## 2024-03-27 ENCOUNTER — APPOINTMENT (INPATIENT)
Dept: RADIOLOGY | Facility: HOSPITAL | Age: 89
DRG: 291 | End: 2024-03-27
Payer: MEDICARE

## 2024-03-27 LAB
BACTERIA UR CULT: NORMAL
BACTERIA UR CULT: NORMAL
GLUCOSE BLD-MCNC: 184 MG/DL (ref 70–99)
POCT TEST: ABNORMAL

## 2024-03-27 PROCEDURE — 21400000 HC ROOM AND CARE CCU/INTERMEDIATE

## 2024-03-27 PROCEDURE — 63700000 HC SELF-ADMINISTRABLE DRUG: Performed by: STUDENT IN AN ORGANIZED HEALTH CARE EDUCATION/TRAINING PROGRAM

## 2024-03-27 PROCEDURE — 63700000 HC SELF-ADMINISTRABLE DRUG

## 2024-03-27 PROCEDURE — 71046 X-RAY EXAM CHEST 2 VIEWS: CPT

## 2024-03-27 PROCEDURE — 63600000 HC DRUGS/DETAIL CODE: Mod: JZ | Performed by: STUDENT IN AN ORGANIZED HEALTH CARE EDUCATION/TRAINING PROGRAM

## 2024-03-27 PROCEDURE — 25800000 HC PHARMACY IV SOLUTIONS: Performed by: STUDENT IN AN ORGANIZED HEALTH CARE EDUCATION/TRAINING PROGRAM

## 2024-03-27 PROCEDURE — 99233 SBSQ HOSP IP/OBS HIGH 50: CPT | Performed by: INTERNAL MEDICINE

## 2024-03-27 RX ADMIN — METOPROLOL SUCCINATE 12.5 MG: 25 TABLET, EXTENDED RELEASE ORAL at 20:48

## 2024-03-27 RX ADMIN — ESCITALOPRAM OXALATE 5 MG: 5 TABLET, FILM COATED ORAL at 08:11

## 2024-03-27 RX ADMIN — FAMOTIDINE 10 MG: 20 TABLET, FILM COATED ORAL at 08:11

## 2024-03-27 RX ADMIN — LATANOPROST 1 DROP: 50 SOLUTION/ DROPS OPHTHALMIC at 20:48

## 2024-03-27 RX ADMIN — METOPROLOL TARTRATE 6.25 MG: 25 TABLET, FILM COATED ORAL at 08:12

## 2024-03-27 RX ADMIN — GUAIFENESIN 600 MG: 600 TABLET ORAL at 08:11

## 2024-03-27 RX ADMIN — MIRTAZAPINE 15 MG: 15 TABLET, FILM COATED ORAL at 20:48

## 2024-03-27 RX ADMIN — GUAIFENESIN 600 MG: 600 TABLET ORAL at 20:48

## 2024-03-27 RX ADMIN — THIAMINE HCL TAB 100 MG 50 MG: 100 TAB at 08:11

## 2024-03-27 RX ADMIN — Medication 500 MCG: at 08:11

## 2024-03-27 RX ADMIN — PANTOPRAZOLE SODIUM 20 MG: 20 TABLET, DELAYED RELEASE ORAL at 08:12

## 2024-03-27 RX ADMIN — LEVOTHYROXINE SODIUM 112 MCG: 0.11 TABLET ORAL at 05:30

## 2024-03-27 RX ADMIN — CLOPIDOGREL 75 MG: 75 TABLET ORAL at 08:11

## 2024-03-27 RX ADMIN — Medication 500 MG: at 08:12

## 2024-03-27 RX ADMIN — FUROSEMIDE 40 MG: 10 INJECTION, SOLUTION INTRAMUSCULAR; INTRAVENOUS at 08:11

## 2024-03-27 RX ADMIN — CEFTRIAXONE SODIUM 1 G: 1 INJECTION, POWDER, FOR SOLUTION INTRAMUSCULAR; INTRAVENOUS at 00:36

## 2024-03-27 ASSESSMENT — COGNITIVE AND FUNCTIONAL STATUS - GENERAL
CLIMB 3 TO 5 STEPS WITH RAILING: 1 - TOTAL
WALKING IN HOSPITAL ROOM: 1 - TOTAL
WALKING IN HOSPITAL ROOM: 1 - TOTAL
STANDING UP FROM CHAIR USING ARMS: 1 - TOTAL
MOVING TO AND FROM BED TO CHAIR: 1 - TOTAL
STANDING UP FROM CHAIR USING ARMS: 1 - TOTAL
STANDING UP FROM CHAIR USING ARMS: 1 - TOTAL
WALKING IN HOSPITAL ROOM: 1 - TOTAL
CLIMB 3 TO 5 STEPS WITH RAILING: 1 - TOTAL
MOVING TO AND FROM BED TO CHAIR: 1 - TOTAL
CLIMB 3 TO 5 STEPS WITH RAILING: 1 - TOTAL
MOVING TO AND FROM BED TO CHAIR: 1 - TOTAL

## 2024-03-27 NOTE — PLAN OF CARE
Care Coordination Admission Assessment Note    General Information:  Readmission Within the last 30 days: no previous admission in last 30 days  Does patient have a : No  Patient-Specific Goals (include timeframe):      Living Arrangements:  Arrived From: home  Current Living Arrangements: home  People in Home:    Home Accessibility:    Living Arrangement Comments: pt lives in a 2 story home, ramp to enter, 1st floor set up, has a private pay 24/7 A    Housing Stability and Utility Access (SDOH):  In the last 12 months, was there a time when you were not able to pay the mortgage or rent on time?: No  In the last 12 months, how many places have you lived?:    In the last 12 months, was there a time when you did not have a steady place to sleep or slept in a shelter (including now)?: No  In the past 12 months has the electric, gas, oil, or water company threatened to shut off services in your home?:      Functional Status Prior to Admission:   Assistive Device/Animal Currently Used at Home: scale, wheelchair, walker, standard  Functional Status Comments:    IADL Comments:       Supports and Services:  Current Outpatient/Agency/Support Group: none  Type of Current Home Care Services:    History of home care episode or rehab stay:      Discharge Needs Assessment:   Concerns to be Addressed: care coordination/care conferences, discharge planning  Current Discharge Risk:    Anticipated Changes Related to Illness: none    Patient/Family Anticipated Discharge Plan:  Patient/Family Anticipates Transition To: home with help/services  Patient/Family Anticipated Services at Transition: home health care    Connection to Community  Patient declined offered resources.      Patient Choice:   Offered/Gave Vendor List: yes  Patient's Choice of Community Agency(s): [facility]  Patient and/or patient guardian/advocate was made aware of their right to choose a provider. A list of eligible providers was presented and  reviewed with the patient and/or patient guardian/advocate in written and/or verbal form. The list delineates providers in the patient’s desired geographic area who are participating in the Medicare program and/or providers contracted with the patient’s primary insurance. The Medicare list and quality ratings were obtained from the Medicare.gov [medicare.gov] website.    Anticipated Discharge Plan:  Met with patient. Provided education and contact information for Care Coordination services.: yes  Anticipated Discharge Disposition: home with home health  Type of Home Care Services: home OT, home PT, nursing      Transportation Needs (SDOH):  Transportation Concerns:    Transportation Anticipated: family or friend will provide  Is Out of Hospital DNR needed at discharge?:      In the past 12 months, has lack of transportation kept you from medical appointments or from getting medications?: No  In the past 12 months, has lack of transportation kept you from meetings, work, or from getting things needed for daily living?: No    Concerns - comments: SW s/w pt son to complete A- home with [facility], family will transport home upon dc

## 2024-03-27 NOTE — CONSULTS
"Brief Nutrition Note    Recommendations   1: Recommend liberalizing diet order to CHELSI given advanced age          Clinical Course: Patient is a 89 y.o. male who was admitted on 3/25/2024 with a diagnosis of Acute congestive heart failure, unspecified heart failure type (CMS/HCC) [I50.9].     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)     Coronary artery disease     Disease of thyroid gland     Will catheter in place     GI (gastrointestinal bleed)     Hypertension     Myocardial infarction (CMS/HCC)     Orthostatic hypotension     TIA (transient ischemic attack)      Past Surgical History:   Procedure Laterality Date    CHOLECYSTECTOMY      CORONARY ARTERY BYPASS GRAFT      JOINT REPLACEMENT Left     Knee    THYROIDECTOMY         Reason for Assessment  Reason For Assessment: physician consult (CHF)          Nutrition/Diet History  Intake (%): 100%    Physical Findings  Last Bowel Movement: 03/27/24  Skin: intact       Nutrition Order  Nutrition Order: meets nutritional requirements  Nutrition Order Comments: Cardiac, 2000 mL  Current TF/TPN Regimen: No     Anthropometrics  Height: 185.4 cm (6' 1\")           Current Weight  Weight Method: Bed scale  Weight: 73.4 kg (161 lb 12.8 oz)     Ideal Body Weight (IBW)  Ideal Body Weight (IBW) (kg): 84.86  % Ideal Body Weight: 89.26            Body Mass Index (BMI)  BMI (Calculated): 21.4                       Labs/Procedures/Meds  Lab Results Reviewed: reviewed, pertinent   Lab Results   Component Value Date    GLUCOSE 125 (H) 03/26/2024    CALCIUM 8.3 (L) 03/26/2024     03/26/2024    K 3.5 03/26/2024    CO2 27 03/26/2024     03/26/2024    BUN 25 03/26/2024    CREATININE 1.6 (H) 03/26/2024             Medications  Pertinent Medications Reviewed: reviewed, pertinent   Scheduled Meds:   ascorbic acid  " 500 mg oral q AM    cefTRIAXone  1 g intravenous q24h INT    clopidogreL  75 mg oral q AM    cyanocobalamin  500 mcg oral q AM    enoxaparin  40 mg subcutaneous Daily (6p)    escitalopram  5 mg oral Daily    famotidine  10 mg oral Daily    furosemide  40 mg intravenous Daily    guaiFENesin  600 mg oral BID    latanoprost  1 drop Left Eye Nightly    levothyroxine  112 mcg oral Daily (6:30a)    metoprolol succinate XL  12.5 mg oral Daily    mirtazapine  15 mg oral Nightly    pantoprazole  20 mg oral Daily    thiamine  50 mg oral Daily             Clinical comments:    Nutrition consult acknowledged, c/s for CHF education- not appropriate given advanced age. Pt known to [facility] from prior admissions, last seen 12/2023. Receiving care on visitation attempts this AM, later sleeping soundly. Tolerating diet w/ good appetite, consumed 100% of breakfast this AM per flow sheets (cream of wheat + fruit + muffin + pancakes). Weights reviewed, noting recent fluctuations. Weight trends likely impacted by fluid shifting given hx of CHF. Dry weight per chart 164#, suspect actual weight may be lower. Noting per chart pt measured 152# via standing scale 12/2023. No noted reports of N/V/D at present. Will continue to monitor.     Goals: PO intake >70% meals through f/u  Monitor: PO intake, diet order, lab values, weight trends, skin integrity, plan of care     Recommendations: See above       Date: 03/27/24  Signature: BRANDY Norton

## 2024-03-27 NOTE — TREATMENT PLAN
Called the patient's son, Marko, and discussed the plan for thoracentesis and TTE.  PT/OT evaluations pending but realistic YAHIR Friday versus weekend pending findings and continued response to treatment.  Son was thankful for the approximation of discharge timeline as he has 24-hour care at home that needs to arrange to resume upon discharge.    Approximately 10 minutes on the phone.    SOPHIA Miranda C

## 2024-03-27 NOTE — ASSESSMENT & PLAN NOTE
CAD post CABG in 1996    -Continue home plavix   -Presentation inconsistent with ACS, trop elevation presumbaly in the setting of ADHF  -Follow-up TTE

## 2024-03-27 NOTE — PROGRESS NOTES
Hospital Medicine Service -  Daily Progress Note       SUBJECTIVE   Interval History:    No events overnight.  Denies shortness of breath at rest but has not moved out of bed, does not do much ambulation at baseline but does report he stands up with some assistance.  Does have 24-hour care at home.  Denies chest pain, heart palpitations, fevers, chills, nausea or vomiting.  Scant cough without production of sputum.  Moved his bowels.  Denies dizziness or lightheadedness.    Otherwise, denies all other review of systems.      OBJECTIVE      Vital signs in last 24 hours:  Temp:  [35.6 °C (96.1 °F)-36.8 °C (98.2 °F)] 35.6 °C (96.1 °F)  Heart Rate:  [64-74] 72  Resp:  [18-21] 18  BP: (101-129)/(57-77) 101/57    Intake/Output Summary (Last 24 hours) at 3/27/2024 1405  Last data filed at 3/27/2024 0800  Gross per 24 hour   Intake 790 ml   Output 1651 ml   Net -861 ml           PHYSICAL EXAMINATION      Physical Exam  Vitals and nursing note reviewed.   Constitutional:       Comments: Cognitive function is much better than one would expect at the age of 89.  Sound medical historian.   Cardiovascular:      Rate and Rhythm: Normal rate and regular rhythm.      Pulses: Normal pulses.      Heart sounds: Murmur heard.      No gallop.   Pulmonary:      Breath sounds: Normal breath sounds.      Comments: Slightly diminished air movement on the left lower lobe, however improved from yesterday objectively.  No wheezes rhonchi or rales.  No egophony  Abdominal:      Palpations: Abdomen is soft.      Tenderness: There is no abdominal tenderness. There is no guarding.   Genitourinary:     Comments: Condom catheter  Musculoskeletal:      Right lower leg: No edema.      Left lower leg: No edema.   Skin:     General: Skin is dry.      Capillary Refill: Capillary refill takes less than 2 seconds.   Neurological:      Mental Status: He is alert and oriented to person, place, and time.   Psychiatric:         Mood and Affect: Mood  normal.            LINES, CATHETERS, DRAINS, AIRWAYS, AND WOUNDS   Lines, Drains, and Airways:  Wounds (agree with documentation and present on admission):  Peripheral IV (Adult) 03/26/24 Anterior;Left Forearm (Active)   Number of days: 1       Colostomy LLQ (Active)   Number of days: 237       External Urinary Catheter Large (Active)   Number of days: 1         Comments:      LABS / IMAGING / TELE      Labs  Results from last 7 days   Lab Units 03/26/24  0611 03/25/24  2131   WBC K/uL 4.26 5.42   HEMOGLOBIN g/dL 9.7* 10.5*   HEMATOCRIT % 31.1* 32.6*   PLATELETS K/uL 116* 111*     Results from last 7 days   Lab Units 03/26/24  0611 03/25/24  2131   SODIUM mEQ/L 140 138   POTASSIUM mEQ/L 3.5 5.0   CHLORIDE mEQ/L 106 106   CO2 mEQ/L 27 25   BUN mg/dL 25 24   CREATININE mg/dL 1.6* 1.5*   GLUCOSE mg/dL 125* 101*   CALCIUM mg/dL 8.3* 8.7         SARS-CoV-2 (COVID-19) (no units)   Date/Time Value   03/25/2024 2133 Negative       Imaging  No results found.      ECG/Telemetry  Reviewed       ASSESSMENT AND PLAN      * Acute congestive heart failure, unspecified heart failure type (CMS/HCC)  Assessment & Plan  Presentation: SOLIS, fatigue, abd swelling, and elevated BNP  Last TTE (8/2023): Estimated EF 45 to 50 %. D shaped left ventricular cavity in the setting of severe RVoverload Unable to assess diastolic function due to atrial arrhythmia. The right ventricular cavity is severely dilated with severely decreased systolic function. Nunes's sign is present. Unchanged from prior one from 8/2022.    Cardiologist: Dr. Jeffrey.   Home diuretic: torsemide 10 mg PO BID  Other GDMT: Metoprolol (specify home dose seeing fills for succinate and tartrate), history of Justin's gangrene with SGLT2i use. Entresto last filled 5/30/2023 (seems to be on ARB alone, likely due to hypotension)    PLAN:  -CXR ordered for 3/26 AM, consider thoracentesis if pleural effusion still present   -Mild troponin elevation likely in the setting of  demand ischemia due to heart failure exacerbation. STAT EKG and trops for chest pain   -Lasix 40mg IV daily for now, consider re-dose in afternoon to achieve net 2L negative daily   -CHF order set: strict I/Os, daily standing weights, cardiac diet, 2g Na, 2L fluids, daily electrolytes with replete for goal K >4.0 and Mg >2.0, continuous telemetry with reassess need daily  -check iron panel for completeness   -not hypoxic, monitor with routine vital signs, goal >92%  -Prior TTE was 7 months ago. Worsening valvular dysfunction would likely not change mgmt as not a surgical candidate however worsening EF could   -confirm functional status, PT/OT to aid in dispo.  Does have 24-hour care at home at baseline  -Follow-up chest x-ray largely unchanged despite diuresis and antibiotics, specifically with regard to the large left pleural effusion.  As such, we will tap the pleural effusion and IR (diagnostic and therapeutic).  Will continue IV Lasix and antibiotics in the interim.    -Obtain TTE seeing unchanged CXR with diuresis, question of newly reduced EF    Aspiration pneumonia (CMS/HCC)  Assessment & Plan  Suspect CXR findings predominately relate to volume overload, however given hx of aspiration, fevers, and productive cough will plan to treat with CTX and check sputum cultures    -Follow blood cultures  -follow up sputum   -add mucolytic   -IS 10x hourly  -SLP to eval for dysphagia, appreciate recs for modified diet, cleared for reg with thins   -consider palliative consult seeing age of 89 years, DNR status, decompensated CHF and possible PNA   -Consider CT chest if decompensates or re-fevers , for now can obtain a chest xray  -continue ceftriaxone empirically seeing fevers >101 Fahrenheit at home prior to admission despite lack of fevers here and no leukocytosis    Pleural effusion  Assessment & Plan  Plan for IR thoracentesis, both diagnostic and therapeutic likely 3/28    Multiple medical  problems  Assessment & Plan  #hypothyroidism  - continue home levothroxine  -check TSH    #depression  -continue home SSRI (escitalopram)    #bacteruria   - reportedly diagnosed with UTI outpatient but not yet prescribed abx?   -UA unremarkable here  -treating emprically for PNA with Rocephen which would cover common urinary bacteria   -monitor for sx and continue abx for now       Coronary artery disease  Assessment & Plan  CAD post CABG in 1996    -Continue home plavix   -Presentation inconsistent with ACS, trop elevation presumbaly in the setting of ADHF  -Follow-up TTE    Atrial fibrillation (CMS/HCC)  Assessment & Plan  Continue metoprolol  He is off any anticoagulation due to his GI bleed   Did not want to pursue NISH closure device in the past and is rate controlled now          VTE Assessment: Padua    VTE Prophylaxis:  Current anticoagulants:  [Provider Managed Hold] enoxaparin (LOVENOX) syringe 40 mg, subcutaneous, Daily (6p)      Code Status: DNR (A.N.D.)      Estimated Discharge Date: 3/28/2024   Disposition Planning: Pending course, PT/OT pending.  24-hour care at home.  Assume he will be able to go back home once workup is completed     SOPHIA Miranda  3/27/2024       NOTE:   Some or all of the note above was created with the use of dictation software, please note this dictation software can have abnormalities in its ability to transcribe. Please contact me directly for anything that seems abnormal, for clarification.

## 2024-03-27 NOTE — PLAN OF CARE
Plan of Care Review  Plan of Care Reviewed With: patient  Progress: improving  Outcome Evaluation: Patient Aox4. Denies chest pain. Denies SOB. Patient VSS. A.fib on the monitor. Meds administered with applesauce this AM. Patient recieved IV lasix. Voiding in urinal. I&Os documented. Will continue to monitor patient.

## 2024-03-27 NOTE — ASSESSMENT & PLAN NOTE
Presentation: SOLIS, fatigue, abd swelling, and elevated BNP  Last TTE (8/2023): Estimated EF 45 to 50 %. D shaped left ventricular cavity in the setting of severe RVoverload Unable to assess diastolic function due to atrial arrhythmia. The right ventricular cavity is severely dilated with severely decreased systolic function. Nunes's sign is present. Unchanged from prior one from 8/2022.    Cardiologist: Dr. Jeffrey.   Home diuretic: torsemide 10 mg PO BID  Other GDMT: Metoprolol (specify home dose seeing fills for succinate and tartrate), history of Justin's gangrene with SGLT2i use. Entresto last filled 5/30/2023 (seems to be on ARB alone, likely due to hypotension)    PLAN:  -CXR ordered for 3/26 AM, consider thoracentesis if pleural effusion still present   -Mild troponin elevation likely in the setting of demand ischemia due to heart failure exacerbation. STAT EKG and trops for chest pain   -Lasix 40mg IV daily for now, consider re-dose in afternoon to achieve net 2L negative daily   -CHF order set: strict I/Os, daily standing weights, cardiac diet, 2g Na, 2L fluids, daily electrolytes with replete for goal K >4.0 and Mg >2.0, continuous telemetry with reassess need daily  -check iron panel for completeness   -not hypoxic, monitor with routine vital signs, goal >92%  -Prior TTE was 7 months ago. Worsening valvular dysfunction would likely not change mgmt as not a surgical candidate however worsening EF could   -confirm functional status, PT/OT to aid in dispo.  Does have 24-hour care at home at baseline  -Follow-up chest x-ray largely unchanged despite diuresis and antibiotics, specifically with regard to the large left pleural effusion.  As such, we will tap the pleural effusion and IR (diagnostic and therapeutic).  Will continue IV Lasix and antibiotics in the interim.    -Obtain TTE seeing unchanged CXR with diuresis, question of newly reduced EF

## 2024-03-28 ENCOUNTER — APPOINTMENT (INPATIENT)
Dept: RADIOLOGY | Facility: HOSPITAL | Age: 89
DRG: 291 | End: 2024-03-28
Attending: PHYSICIAN ASSISTANT
Payer: MEDICARE

## 2024-03-28 ENCOUNTER — APPOINTMENT (INPATIENT)
Dept: CARDIOLOGY | Facility: HOSPITAL | Age: 89
DRG: 291 | End: 2024-03-28
Payer: MEDICARE

## 2024-03-28 ENCOUNTER — APPOINTMENT (INPATIENT)
Dept: RADIOLOGY | Facility: HOSPITAL | Age: 89
DRG: 291 | End: 2024-03-28
Payer: MEDICARE

## 2024-03-28 PROBLEM — J69.0 ASPIRATION PNEUMONIA (CMS/HCC): Status: RESOLVED | Noted: 2024-03-26 | Resolved: 2024-03-28

## 2024-03-28 PROBLEM — R31.9 HEMATURIA: Status: ACTIVE | Noted: 2024-03-28

## 2024-03-28 PROBLEM — J18.9 PNEUMONIA: Status: ACTIVE | Noted: 2024-03-26

## 2024-03-28 LAB
ANION GAP SERPL CALC-SCNC: 11 MEQ/L (ref 3–15)
APPEARANCE FLD: ABNORMAL
ATRIAL RATE: 82
BACTERIA URNS QL MICRO: ABNORMAL /HPF
BASOPHILS # BLD: 0.04 K/UL (ref 0.01–0.1)
BASOPHILS NFR BLD: 0.8 %
BILIRUB UR QL STRIP.AUTO: NEGATIVE MG/DL
BODY FLD TYPE: ABNORMAL
BSA FOR ECHO PROCEDURE: 1.98 M2
BUN SERPL-MCNC: 32 MG/DL (ref 7–25)
CALCIUM SERPL-MCNC: 8.2 MG/DL (ref 8.6–10.3)
CHLORIDE SERPL-SCNC: 104 MEQ/L (ref 98–107)
CLARITY UR REFRACT.AUTO: CLEAR
CO2 SERPL-SCNC: 22 MEQ/L (ref 21–31)
COLOR FLD: YELLOW
COLOR UR AUTO: YELLOW
CREAT SERPL-MCNC: 1.6 MG/DL (ref 0.7–1.3)
DIFFERENTIAL METHOD BLD: ABNORMAL
E WAVE DECELERATION TIME: 105 MS
EF (A4C): 48.9 %
EGFRCR SERPLBLD CKD-EPI 2021: 40.2 ML/MIN/1.73M*2
EOSINOPHIL # BLD: 0.16 K/UL (ref 0.04–0.54)
EOSINOPHIL NFR BLD: 3.2 %
ERYTHROCYTE [DISTWIDTH] IN BLOOD BY AUTOMATED COUNT: 17.1 % (ref 11.6–14.4)
GLUCOSE FLD-MCNC: 144 MG/DL
GLUCOSE SERPL-MCNC: 162 MG/DL (ref 70–99)
GLUCOSE UR STRIP.AUTO-MCNC: NEGATIVE MG/DL
HCT VFR BLD AUTO: 30.2 % (ref 40.1–51)
HEART RATE: 65 BPM
HGB BLD-MCNC: 9.9 G/DL (ref 13.7–17.5)
HGB UR QL STRIP.AUTO: 1
HYALINE CASTS #/AREA URNS LPF: ABNORMAL /LPF
IMM GRANULOCYTES # BLD AUTO: 0.02 K/UL (ref 0–0.08)
IMM GRANULOCYTES NFR BLD AUTO: 0.4 %
INR PPP: 1.5
KETONES UR STRIP.AUTO-MCNC: NEGATIVE MG/DL
LDH FLD L TO P-CCNC: 94 U/L
LEFT VENTRICLE DIASTOLIC VOLUME INDEX: 18.94 CM3/M2
LEFT VENTRICLE DIASTOLIC VOLUME: 37.5 CM3
LEFT VENTRICLE SYSTOLIC VOLUME INDEX: 9.65 CM3/M2
LEFT VENTRICLE SYSTOLIC VOLUME: 19.1 CM3
LEUKOCYTE ESTERASE UR QL STRIP.AUTO: 2
LVAD-AP4: 18.6 CM2
LVAS-AP4: 12.1 CM2
LVLD-AP4: 7.55 CM
LVLS-AP4: 6.65 CM
LVOT MG: 1 MMHG
LVOT MV: 0.34 M/S
LVOT PEAK VELOCITY: 0.54 M/S
LVOT PG: 1 MMHG
LVOT VTI: 10.9 CM
LYMPHOCYTES # BLD: 0.79 K/UL (ref 1.2–3.5)
LYMPHOCYTES NFR BLD: 16 %
LYMPHOCYTES NFR FLD MANUAL: 79 %
MCH RBC QN AUTO: 33.3 PG (ref 28–33.2)
MCHC RBC AUTO-ENTMCNC: 32.8 G/DL (ref 32.2–36.5)
MCV RBC AUTO: 101.7 FL (ref 83–98)
MONOCYTES # BLD: 0.53 K/UL (ref 0.3–1)
MONOCYTES NFR BLD: 10.8 %
MONOS+MACROS NFR FLD MANUAL: 19 %
MUCOUS THREADS URNS QL MICRO: ABNORMAL /LPF
MV PEAK E VEL: 0.58 M/S
MV STENOSIS PRESSURE HALF TIME: 31 MS
MV VALVE AREA P 1/2 METHOD: 7.1 CM2
NEUTROPHILS # BLD: 3.39 K/UL (ref 1.7–7)
NEUTROPHILS NFR FLD MANUAL: 2 %
NEUTS SEG NFR BLD: 68.8 %
NITRITE UR QL STRIP.AUTO: NEGATIVE
NRBC BLD-RTO: 0 %
PDW BLD AUTO: 10.7 FL (ref 9.4–12.4)
PH FLD: 8 [PH]
PH UR STRIP.AUTO: 6 [PH]
PLATELET # BLD AUTO: 121 K/UL (ref 150–350)
POTASSIUM SERPL-SCNC: 3.6 MEQ/L (ref 3.5–5.1)
PROT FLD-MCNC: 4 G/DL
PROT UR QL STRIP.AUTO: NEGATIVE
PROTHROMBIN TIME: 17.7 SEC (ref 12.2–14.5)
QRS DURATION: 176
QT INTERVAL: 520
QTC CALCULATION(BAZETT): 552
R AXIS: 89
RBC # BLD AUTO: 2.97 M/UL (ref 4.5–5.8)
RBC # SNV: ABNORMAL CELLS/CU MM (ref 0–10000)
RBC #/AREA URNS HPF: ABNORMAL /HPF
SODIUM SERPL-SCNC: 137 MEQ/L (ref 136–145)
SP GR UR REFRACT.AUTO: 1.01
SPECIMEN SOURCE: ABNORMAL
SQUAMOUS URNS QL MICRO: ABNORMAL /HPF
T WAVE AXIS: -33
TR MAX PG: 7.51 MMHG
TRICUSPID VALVE PEAK REGURGITATION VELOCITY: 1.37 M/S
UROBILINOGEN UR STRIP-ACNC: 0.2 EU/DL
VENTRICULAR RATE: 68
WBC # BLD AUTO: 4.93 K/UL (ref 3.8–10.5)
WBC # SNV AUTO: 766 CELLS/CU MM (ref 0–200)
WBC #/AREA URNS HPF: ABNORMAL /HPF

## 2024-03-28 PROCEDURE — 99233 SBSQ HOSP IP/OBS HIGH 50: CPT | Performed by: INTERNAL MEDICINE

## 2024-03-28 PROCEDURE — 87116 MYCOBACTERIA CULTURE: CPT

## 2024-03-28 PROCEDURE — C1729 CATH, DRAINAGE: HCPCS

## 2024-03-28 PROCEDURE — 63700000 HC SELF-ADMINISTRABLE DRUG

## 2024-03-28 PROCEDURE — 63700000 HC SELF-ADMINISTRABLE DRUG: Performed by: STUDENT IN AN ORGANIZED HEALTH CARE EDUCATION/TRAINING PROGRAM

## 2024-03-28 PROCEDURE — 84157 ASSAY OF PROTEIN OTHER: CPT

## 2024-03-28 PROCEDURE — 63600000 HC DRUGS/DETAIL CODE: Mod: JZ | Performed by: STUDENT IN AN ORGANIZED HEALTH CARE EDUCATION/TRAINING PROGRAM

## 2024-03-28 PROCEDURE — 82945 GLUCOSE OTHER FLUID: CPT

## 2024-03-28 PROCEDURE — 85025 COMPLETE CBC W/AUTO DIFF WBC: CPT

## 2024-03-28 PROCEDURE — 83986 ASSAY PH BODY FLUID NOS: CPT

## 2024-03-28 PROCEDURE — 83615 LACTATE (LD) (LDH) ENZYME: CPT

## 2024-03-28 PROCEDURE — 89051 BODY FLUID CELL COUNT: CPT

## 2024-03-28 PROCEDURE — 93308 TTE F-UP OR LMTD: CPT | Mod: 26 | Performed by: INTERNAL MEDICINE

## 2024-03-28 PROCEDURE — 92526 ORAL FUNCTION THERAPY: CPT | Mod: GN

## 2024-03-28 PROCEDURE — 93325 DOPPLER ECHO COLOR FLOW MAPG: CPT

## 2024-03-28 PROCEDURE — 36100330 IR THORACENTESIS

## 2024-03-28 PROCEDURE — 87206 SMEAR FLUORESCENT/ACID STAI: CPT

## 2024-03-28 PROCEDURE — 21400000 HC ROOM AND CARE CCU/INTERMEDIATE

## 2024-03-28 PROCEDURE — 87205 SMEAR GRAM STAIN: CPT

## 2024-03-28 PROCEDURE — 85610 PROTHROMBIN TIME: CPT

## 2024-03-28 PROCEDURE — 93010 ELECTROCARDIOGRAM REPORT: CPT | Performed by: INTERNAL MEDICINE

## 2024-03-28 PROCEDURE — 80048 BASIC METABOLIC PNL TOTAL CA: CPT

## 2024-03-28 PROCEDURE — 0W9B3ZX DRAINAGE OF LEFT PLEURAL CAVITY, PERCUTANEOUS APPROACH, DIAGNOSTIC: ICD-10-PCS | Performed by: RADIOLOGY

## 2024-03-28 PROCEDURE — 25800000 HC PHARMACY IV SOLUTIONS: Performed by: STUDENT IN AN ORGANIZED HEALTH CARE EDUCATION/TRAINING PROGRAM

## 2024-03-28 PROCEDURE — 87102 FUNGUS ISOLATION CULTURE: CPT

## 2024-03-28 PROCEDURE — 36415 COLL VENOUS BLD VENIPUNCTURE: CPT

## 2024-03-28 PROCEDURE — 71045 X-RAY EXAM CHEST 1 VIEW: CPT

## 2024-03-28 PROCEDURE — 93321 DOPPLER ECHO F-UP/LMTD STD: CPT | Mod: 26 | Performed by: INTERNAL MEDICINE

## 2024-03-28 PROCEDURE — 93325 DOPPLER ECHO COLOR FLOW MAPG: CPT | Mod: 26 | Performed by: INTERNAL MEDICINE

## 2024-03-28 PROCEDURE — 81001 URINALYSIS AUTO W/SCOPE: CPT

## 2024-03-28 PROCEDURE — 93005 ELECTROCARDIOGRAM TRACING: CPT

## 2024-03-28 PROCEDURE — 88112 CYTOPATH CELL ENHANCE TECH: CPT

## 2024-03-28 RX ADMIN — FUROSEMIDE 40 MG: 10 INJECTION, SOLUTION INTRAMUSCULAR; INTRAVENOUS at 08:42

## 2024-03-28 RX ADMIN — CLOPIDOGREL 75 MG: 75 TABLET ORAL at 08:42

## 2024-03-28 RX ADMIN — ESCITALOPRAM OXALATE 5 MG: 5 TABLET, FILM COATED ORAL at 08:42

## 2024-03-28 RX ADMIN — Medication 500 MCG: at 08:42

## 2024-03-28 RX ADMIN — PANTOPRAZOLE SODIUM 20 MG: 20 TABLET, DELAYED RELEASE ORAL at 08:42

## 2024-03-28 RX ADMIN — FAMOTIDINE 10 MG: 20 TABLET, FILM COATED ORAL at 08:42

## 2024-03-28 RX ADMIN — METOPROLOL SUCCINATE 12.5 MG: 25 TABLET, EXTENDED RELEASE ORAL at 22:07

## 2024-03-28 RX ADMIN — LATANOPROST 1 DROP: 50 SOLUTION/ DROPS OPHTHALMIC at 22:10

## 2024-03-28 RX ADMIN — GUAIFENESIN 600 MG: 600 TABLET ORAL at 08:42

## 2024-03-28 RX ADMIN — LEVOTHYROXINE SODIUM 112 MCG: 0.11 TABLET ORAL at 05:54

## 2024-03-28 RX ADMIN — THIAMINE HCL TAB 100 MG 50 MG: 100 TAB at 08:42

## 2024-03-28 RX ADMIN — CEFTRIAXONE SODIUM 1 G: 1 INJECTION, POWDER, FOR SOLUTION INTRAMUSCULAR; INTRAVENOUS at 00:19

## 2024-03-28 RX ADMIN — MIRTAZAPINE 15 MG: 15 TABLET, FILM COATED ORAL at 22:06

## 2024-03-28 RX ADMIN — Medication 500 MG: at 08:42

## 2024-03-28 RX ADMIN — GUAIFENESIN 600 MG: 600 TABLET ORAL at 22:07

## 2024-03-28 ASSESSMENT — COGNITIVE AND FUNCTIONAL STATUS - GENERAL
STANDING UP FROM CHAIR USING ARMS: 1 - TOTAL
WALKING IN HOSPITAL ROOM: 1 - TOTAL
MOVING TO AND FROM BED TO CHAIR: 1 - TOTAL
CLIMB 3 TO 5 STEPS WITH RAILING: 1 - TOTAL

## 2024-03-28 NOTE — ASSESSMENT & PLAN NOTE
Presentation: SOLIS, fatigue, abd swelling, and elevated BNP  Last TTE (8/2023): Estimated EF 45 to 50 %. D shaped left ventricular cavity in the setting of severe RVoverload Unable to assess diastolic function due to atrial arrhythmia. The right ventricular cavity is severely dilated with severely decreased systolic function. Nunes's sign is present. Unchanged from prior one from 8/2022.    Cardiologist: Dr. Jeffrey.   Home diuretic: Torsemide 10 mg PO BID  Other GDMT: Metoprolol (specify home dose seeing fills for succinate and tartrate), history of Justin's gangrene with SGLT2i use. Entresto last filled 5/30/2023 (seems to be on ARB alone, likely due to hypotension)    PLAN:  -Mild troponin elevation likely in the setting of demand ischemia due to heart failure exacerbation. STAT EKG and trops for chest pain   -Lasix 40mg IV daily for now, consider re-dose in afternoon to achieve net 2L negative daily   -CHF order set: strict I/Os, daily standing weights, cardiac diet, 2g Na, 2L fluids, daily electrolytes with replete for goal K >4.0 and Mg >2.0, continuous telemetry with reassess need daily  -not hypoxic, monitor with routine vital signs, goal >92%  -Prior TTE was 7 months ago.  Repeat TTE with unchanged EF, again shows severely dilated RV with decreased RV systolic function (Nunes sign).  This is unchanged from prior echocardiogram.  Discussed this finding with the patient, and he recalls being told about this by his outpatient cardiologist as well.  Clinically, he does not present with symptoms of PA such as tachycardia, chest pain, S1Q3T3 on EKG.  History without recent travel, no leg pain, non-smoker.  As such, no indication to check a CTA PE protocol at this time.  -confirm functional status, PT/OT to aid in dispo.  Does have 24-hour care at home at baseline  -Follow-up chest x-ray largely unchanged despite diuresis and antibiotics, specifically with regard to the large left pleural effusion.  As  such, we will tap the pleural effusion and IR (diagnostic and therapeutic).  Will continue IV Lasix and antibiotics in the interim.

## 2024-03-28 NOTE — PROGRESS NOTES
Hospital Medicine Service -  Daily Progress Note       SUBJECTIVE   Interval History:    No events overnight.  Patient feels well, denies shortness of breath, chest pain, palpitation, dizziness, lightheadedness, nausea or vomiting.    Went to the bedside again this afternoon due to new complaint of hematuria/scant amount of blood from urethral meatus.  He denies any history of this, but does report he may have inflicted some trauma to the urethral meatus with the plastic urinal with positioning which has been difficult for him.    Plan for [facility] [place] approximately 1430.    I called the patient's son, Marko, given medical update.  He is agreeable to the plan.  Peripherally working on arranging home care to continue/resume 3/29 approximately 1600.  He is aware that dispo is still up in the air pending course in the next 12/24 hours.     OBJECTIVE      Vital signs in last 24 hours:  Temp:  [36.2 °C (97.1 °F)-37.2 °C (98.9 °F)] 36.7 °C (98 °F)  Heart Rate:  [60-80] 64  Resp:  [16-18] 18  BP: ()/(56-76) 104/60    Intake/Output Summary (Last 24 hours) at 3/28/2024 1444  Last data filed at 3/28/2024 0430  Gross per 24 hour   Intake 405 ml   Output 575 ml   Net -170 ml           PHYSICAL EXAMINATION      Physical Exam  Vitals and nursing note reviewed.   Constitutional:       Comments: Cognitive function is much better than one would expect at the age of 89.  Sound medical historian.   Cardiovascular:      Rate and Rhythm: Normal rate and regular rhythm.      Pulses: Normal pulses.      Heart sounds: Murmur heard.      No gallop.   Pulmonary:      Breath sounds: Normal breath sounds.      Comments: Slightly diminished air movement on the left lower lobe.  No wheezes rhonchi or rales.  No egophony  Abdominal:      Palpations: Abdomen is soft.  Ostomy noted.     Tenderness: There is no abdominal tenderness. There is no guarding.   Genitourinary:     No Will or condom catheter in place.  Scant, red blood appreciated  from the urethral meatus.  No lesions noted externally around the head of the penis nor the shaft.  Nontender to palpation.  Blood-tinged urine at the bedside (sending for UA)  Musculoskeletal:      Right lower leg: No edema.      Left lower leg: No edema.   Skin:     General: Skin is dry.      Capillary Refill: Capillary refill takes less than 2 seconds.   Neurological:      Mental Status: He is alert and oriented to person, place, and time.   Psychiatric:         Mood and Affect: Mood normal.              LINES, CATHETERS, DRAINS, AIRWAYS, AND WOUNDS   Lines, Drains, and Airways:  Wounds (agree with documentation and present on admission):  Peripheral IV (Adult) 03/26/24 Anterior;Left Forearm (Active)   Number of days: 2       Colostomy LLQ (Active)   Number of days: 238       External Urinary Catheter Large (Active)   Number of days: 2         Comments:      LABS / IMAGING / TELE      Labs  Results from last 7 days   Lab Units 03/28/24  1416 03/26/24  0611 03/25/24  2131   WBC K/uL 4.93 4.26 5.42   HEMOGLOBIN g/dL 9.9* 9.7* 10.5*   HEMATOCRIT % 30.2* 31.1* 32.6*   PLATELETS K/uL 121* 116* 111*     Results from last 7 days   Lab Units 03/26/24  0611 03/25/24  2131   SODIUM mEQ/L 140 138   POTASSIUM mEQ/L 3.5 5.0   CHLORIDE mEQ/L 106 106   CO2 mEQ/L 27 25   BUN mg/dL 25 24   CREATININE mg/dL 1.6* 1.5*   GLUCOSE mg/dL 125* 101*   CALCIUM mg/dL 8.3* 8.7         SARS-CoV-2 (COVID-19) (no units)   Date/Time Value   03/25/2024 2133 Negative       Imaging  X-RAY CHEST 2 VIEWS    Result Date: 3/27/2024  CLINICAL HISTORY:   assess effusion post diuresis COMMENT: VIEWS: two - frontal and lateral. Comparison date: March 25, 2024. The heart and mediastinal contours are unchanged. Large left pleural effusion with volume loss is seen. Small right pleural effusion is seen with basilar airspace opacity. Upper lung zones are unchanged. Bony thorax is stable.     IMPRESSION: No interval change in large left pleural effusion and  small right pleural effusion       ECG/Telemetry  Reviewed, recheck pending    ASSESSMENT AND PLAN      * Acute congestive heart failure, unspecified heart failure type (CMS/HCC)  Assessment & Plan  Presentation: SOLIS, fatigue, abd swelling, and elevated BNP  Last TTE (8/2023): Estimated EF 45 to 50 %. D shaped left ventricular cavity in the setting of severe RVoverload Unable to assess diastolic function due to atrial arrhythmia. The right ventricular cavity is severely dilated with severely decreased systolic function. Nunes's sign is present. Unchanged from prior one from 8/2022.    Cardiologist: Dr. Jeffrey.   Home diuretic: Torsemide 10 mg PO BID  Other GDMT: Metoprolol (specify home dose seeing fills for succinate and tartrate), history of Justin's gangrene with SGLT2i use. Entresto last filled 5/30/2023 (seems to be on ARB alone, likely due to hypotension)    PLAN:  -Mild troponin elevation likely in the setting of demand ischemia due to heart failure exacerbation. STAT EKG and trops for chest pain   -Lasix 40mg IV daily for now, consider re-dose in afternoon to achieve net 2L negative daily   -CHF order set: strict I/Os, daily standing weights, cardiac diet, 2g Na, 2L fluids, daily electrolytes with replete for goal K >4.0 and Mg >2.0, continuous telemetry with reassess need daily  -not hypoxic, monitor with routine vital signs, goal >92%  -Prior TTE was 7 months ago.  Repeat TTE with unchanged EF, again shows severely dilated RV with decreased RV systolic function (Nunes sign).  This is unchanged from prior echocardiogram.  Discussed this finding with the patient, and he recalls being told about this by his outpatient cardiologist as well.  Clinically, he does not present with symptoms of PA such as tachycardia, chest pain, S1Q3T3 on EKG.  History without recent travel, no leg pain, non-smoker.  As such, no indication to check a CTA PE protocol at this time.  -confirm functional status, PT/OT to  aid in dispo.  Does have 24-hour care at home at baseline  -Follow-up chest x-ray largely unchanged despite diuresis and antibiotics, specifically with regard to the large left pleural effusion.  As such, we will tap the pleural effusion and IR (diagnostic and therapeutic).  Will continue IV Lasix and antibiotics in the interim.      Pneumonia  Assessment & Plan  Suspect CXR findings predominately relate to volume overload, however given hx of aspiration, fevers, and productive cough will plan to treat with CTX and check sputum cultures    -Follow blood cultures  -follow up sputum   -add mucolytic   -IS 10x hourly  -SLP to eval for dysphagia, appreciate recs for modified diet, cleared for reg with thins   -consider palliative consult seeing age of 89 years, DNR status, decompensated CHF and possible PNA   -Consider CT chest if decompensates or re-fevers , for now can obtain a chest xray  -continue ceftriaxone empirically seeing fevers >101 Fahrenheit at home prior to admission despite lack of fevers here and no leukocytosis    Pleural effusion  Assessment & Plan  Plan for IR thoracentesis, both diagnostic and therapeutic likely 3/28    Multiple medical problems  Assessment & Plan  #hypothyroidism  - continue home levothroxine  -check TSH    #depression  -continue home SSRI (escitalopram)    #bacteruria   - reportedly diagnosed with UTI outpatient but not yet prescribed abx?   -UA unremarkable here on admission  -treating emprically for PNA with Rocephen which would cover common urinary bacteria   -monitor for sx and continue abx for now       Hematuria  Assessment & Plan  Nursing reports scant hematuria and small amount of florencio blood from the urethral meatus even without active urination.  No history of this according to the patient.  No Will catheters this admission, he has historically had a Will catheter, most recently a few months ago he says.    -Exam without obvious signs of trauma on the external urethral  "meatus.  -Patient reports has had some trouble repositioning the plastic urinal, may have \"nicked\" the urethral meatus perhaps    -Check a CBC  -Okay to continue antiplatelet therapy and DVT prophylaxis  -UA on admission without signs of UTI, recheck UA seeing new hematuria  -Referral to urology for hematuria workup if aligns with goals of care (89 years)         VTE Assessment: Padua    VTE Prophylaxis:  Current anticoagulants:  [Provider Managed Hold] enoxaparin (LOVENOX) syringe 40 mg, subcutaneous, Daily (6p)      Code Status: DNR (A.N.D.)      Estimated Discharge Date: 3/29/2024   Disposition Planning: Late tomorrow versus weekend pending course (timing of Thora, urinary output with diuretic), PT/OT     SOPHIA Miranda  3/28/2024       NOTE:   Some or all of the note above was created with the use of dictation software, please note this dictation software can have abnormalities in its ability to transcribe. Please contact me directly for anything that seems abnormal, for clarification.         "

## 2024-03-28 NOTE — PROGRESS NOTES
"CHF Discharge Counseling- Transitions of Care   Pharmacy Counseling Note      SUMMARY   Spoke with Samy Elena's son, Marko, via telephone call for CHF Discharge counseling. Of note, Marko has received extensive discharge counseling from my colleague, Michelle Mendez, PharmD, during [place] [facility] [place]. The patient's son stated he is familiar with the medications that the patient had been taking prior to admission. Reviewed all patient’s current medications in detail. Discussed the name, dose, frequency, indication and side effects. Educated patient's son on low-sodium diet, fluid restriction, and daily weight monitoring.     Medications: Samy Elena's son, Marko, stated that he is familiar with the medications that the patient had been taking prior to admission. He stated that the patient takes his medications \"religiously\" and cannot recall the last time he missed a dose.  The patient stated his preferred pharmacy is [facility] ([address] [place] [address], [place]), and I encouraged he use a pill box to help aid with compliance at home. I explained pertinent medication changes including potentially adjusting diuretic regimen, and stressed the importance of compliance. I explained each medication in detail, including indication, dose, and side effects. I encouraged he read over the AVS very closely for any additional medication changes after discharge, and I provided my contact information for any additional questions.  I explained the importance of avoiding NSAIDs and using acetaminophen as needed for mild pain. The patient's son seemed to understand all counseling points.     Medication Comments/Recommendations:  -Per discussion with the patient's son, he was told by his cardiologist to administer torsemide 15mg PO QOD alternating with 10mg when he notices his father stating to retain fluid  --He stated this worked well for a few months and was able to keep him out of the " hospital  ---Mentioned this to the patient's medical team       Low-Sodium Diet & Fluid Restriction: The patient's son stated the patient eats a very healthy diet and barely eats any salt at home, which I encouraged he continue. I provided education on low-sodium diet, including avoiding processed foods and foods high in sodium, such as frozen meals, canned products, lunch meats/hot dogs, most cheeses (with the exception of Swiss Cheese), white breads, salty snacks, and salty breakfast meats. I also recommended purchasing low-fat options, such as yogurt, cottage cheese, etc.  I recommended purchasing fresh foods and preparing home-cooked meals as much as possible.   I also explained the importance of fluid restriction and to not exceed ~8 cups of fluid per day. Marko stated he thinks the patient was likely exceeding this prior to admission due to constantly feeling dry. He stated they have tried lozenges/gum to help with dry mouth, but it doesn't seem to work. I recommended ice cubes instead of large amounts of water, which Marko stated he will try. The patient's son seemed to understand all counseling points.     Daily Weight Monitoring: Samy Elena stated he does have a working scale at home and that he has been compliant with daily weight monitoring. I explained the importance of recording daily weights and provided the patient with a calender to record those weights. I instructed the patient to ensure he calls his doctor if he gains 3 lbs in 1 day or 5 lbs in 1 week. The patient's son seemed to understand all counseling points.     MEDICATION     Medication List (Current Inpatient Medications)   ascorbic acid  500 mg oral q AM    cefTRIAXone  1 g intravenous q24h INT    clopidogreL  75 mg oral q AM    cyanocobalamin  500 mcg oral q AM    [Provider Managed Hold] enoxaparin  40 mg subcutaneous Daily (6p)    escitalopram  5 mg oral Daily    famotidine  10 mg oral Daily    furosemide  40 mg intravenous Daily     guaiFENesin  600 mg oral BID    latanoprost  1 drop Left Eye Nightly    levothyroxine  112 mcg oral Daily (6:30a)    metoprolol succinate XL  12.5 mg oral Daily    mirtazapine  15 mg oral Nightly    pantoprazole  20 mg oral Daily    thiamine  50 mg oral Daily       glucose **OR** dextrose **OR** glucagon **OR** dextrose 50 % in water (D50)        Radha Hoffmann, PharmD, Transitions of Care Clinical Pharmacist    Office Phone: [phone]  [facility] Phone: [phone]  (Time Spent: 20 minutes)  3/28/2024

## 2024-03-28 NOTE — PLAN OF CARE
Plan of Care Review  Plan of Care Reviewed With: patient  Progress: improving  Outcome Evaluation: Patient is A&Ox no distress noted. 1.3L removed with thoracentsis. pt had an episode of blood tinged urine/bleeding from penis, PA notified and made aware. urine cultures sent. pt voiding in urinal. colostomy care provided. all needs met call bell in reach

## 2024-03-28 NOTE — ED ATTESTATION NOTE
I have personally seen and examined Samy Elena.  I was involved in the care and medical decision making for this patient.    I reviewed and agree with physician assistant / nurse practitioner’s assessment and plan of care; any exceptions are documented below.      My focused history, examination, assessment and plan of care of Samy Elena is as follows:    Brief History:    89-year-old male with a past medical history of heart failure reduced EF, A-fib not on anticoagulation, hypertension, diverticulitis status post colostomy with recurrent pleural effusions that requires left-sided thoracentesis presents with shortness of breath, generalized weakness.  Patient appears overloaded on exam.  Will admit for diuresis.    Focused Physical Exam:  Exam:  General: Appears well, alert and oriented x4, no diaphoresis  CV: Regular rate and rhythm  Pulmonary: No respiratory distress, no audible wheezing  Abdominal: Soft, nontender  Neuro: Able to speak without difficulty in full sentences, no confusion      Assessment / Plan / MDM:  MDM        I was physically present for the key/critical portions of the following procedures:  Procedures           Susanna Morgan, DO  03/28/24 0737

## 2024-03-28 NOTE — PROGRESS NOTES
Patient: Samy Elena  Location: [place] [address]  MRN:  [id number]  Today's date:  3/28/2024    Attempted to see patient for therapy. Unable due to medical hold (Attempted to follow-up with pt for dysphagia therapy. Pt is currently NPO for a thoracentesis today. Will follow-up again as pt is able and appropriate.).

## 2024-03-28 NOTE — ASSESSMENT & PLAN NOTE
"Nursing reports scant hematuria and small amount of florencio blood from the urethral meatus even without active urination.  No history of this according to the patient.  No Will catheters this admission, he has historically had a Will catheter, most recently a few months ago he says.    -Exam without obvious signs of trauma on the external urethral meatus.  -Patient reports has had some trouble repositioning the plastic urinal, may have \"nicked\" the urethral meatus perhaps    -Check a CBC  -Okay to continue antiplatelet therapy and DVT prophylaxis  -UA on admission without signs of UTI, recheck UA seeing new hematuria  -Referral to urology for hematuria workup if aligns with goals of care (89 years)  "

## 2024-03-28 NOTE — POST-PROCEDURE NOTE
Interventional Radiology Brief Postprocedure Note    Samy Elena     Attending: SOPHIA Andrade, Gigi Berry MD    Assistant: n/a    Diagnosis: left pleural effusion    Description of procedure: left thoracentesis    Contrast: none     Anesthesia:  Local (Lidocaine) 1%    Volume of Lidocaine Utilized (ml): 4     Medications: none     Complications: None      Estimated Blood Loss: Estimated Blood Loss: 0-10 ml    Anticoagulation: may resume plavix and lovenox    Specimens: 1.3L clear yellow pleural fluid    Findings: successful ultrasound guided left thoracentesis, 1.3L removed, specimen sent to lab, VSS, pt tolerated well, CXR ordered to be completed in 1 hour; please note moderate size left pleural effusion still remaining     3/28/2024 3:36 PM

## 2024-03-28 NOTE — PLAN OF CARE
Care Coordination Discharge Plan Note     Discharge Needs Assessment  Concerns to be Addressed: care coordination/care conferences, discharge planning  Current Discharge Risk:      Anticipated Discharge Plan  Anticipated Discharge Disposition: home with home health  Type of Home Care Services: home OT, home PT, nursing        Patient Choice  Offered/Gave Vendor List: yes  Patient's Choice of Community Agency(s): [facility]    Patient and/or patient guardian/advocate was made aware of their right to choose a provider. A list of eligible providers was presented and reviewed with the patient and/or patient guardian/advocate in written and/or verbal form. The list delineates providers in the patient’s desired geographic area who are participating in the Medicare program and/or providers contracted with the patient’s primary insurance. The Medicare list and quality ratings were obtained from the Medicare.gov [medicare.gov] website.    ---------------------------------------------------------------------------------------------------------------------    Interdisciplinary Discharge Plan Review:  Participants:     Concerns Comments: Per rounds, pt is likely stable for dc tomrorrow. Pt is diuresing one more day. [facility] is following. PT/OT armin. [id number]    Discharge Plan:   Disposition/Destination: Home Health Care - [facility] / Home  Discharge Facility:    Community Resources:      Discharge Transportation:  Is Out of Hospital DNR needed at Discharge:    Does patient need discharge transport?

## 2024-03-28 NOTE — OR SURGEON
Pre-Procedure patient identification:  I am the primary operating surgeon/proceduralist and I have reviewed the applicable pathology reports and radiology studies for this procedure. I have identified the patient and confirmed laterality is left on 03/28/24 at 3:01 PM SOPHIA Andrade C

## 2024-03-28 NOTE — ASSESSMENT & PLAN NOTE
#hypothyroidism  - continue home levothroxine  -check TSH    #depression  -continue home SSRI (escitalopram)    #bacteruria   - reportedly diagnosed with UTI outpatient but not yet prescribed abx?   -UA unremarkable here on admission  -treating emprically for PNA with Rocephen which would cover common urinary bacteria   -monitor for sx and continue abx for now

## 2024-03-28 NOTE — PROGRESS NOTES
Patient:  Samy Elena  Location:  [place] [address]  MRN:  [id number]  Today's date:  3/28/2024    SLP Diagnosis  Known hx of mild oropharyngeal dysphagia, no overt s/sx aspiration with intake    Swallowing Recommendations   SLP Swallowing Recommendations - 03/28/24 1215       Diet Consistency Recommendations regular;thin liquids     Medication Administration whole with liquid;one at a time     Supervision Level for Intake distant supervision needed     Feeding/Delivery Recommendations allow patient to feed self if maintaining safety;small bites/sips;single cup sips only;stop meal if showing signs of aspiration or fatigue (e.g., coughing, wet voice)     Posture Recommendations fully upright in chair/chair mode of bed     Swallowing Strategies alternate food and liquid intake     Instrumental Assessment Recommendations reassess via non-instrumental clinical swallow evaluation;VFSS (videofluroscopic swallowing study)     Recommend VFSS (Comment) Pt previously declined further instrumental assessment, wishes to discharge home as soon as possible     Oral Hygiene twice daily teeth brushing     Prognosis for Return to Safe Oral Intake fair;anticipate dysphagia will be a chronic issue     Comment, Swallowing Recommendations Continue aspiration precautions, stringent oral hygiene, ST will continue to monitor diet tolerance                     Summary/Handoff  Pt seen for follow-up dysphagia therapy. He was seen upright and communicating appropriately and pleasantly upon room entry. He denies any swallowing difficulty with current PO diet of reg/thin liquids. PO trials of regular solids and thin liquids were presented. Oral and pharyngeal phases appeared functional without overt s/sx aspiration or distress noted across all trials. Recommend continuing current diet of regular solids and thin liquids with aspiration precautions, medications as tolerated, and assistance as needed with intake. ST  will continue to follow and monitor diet tolerance.    Active Diet Orders (From admission, onward)       Start     Ordered    03/28/24 0840  Adult Diet Regular; 3-4gm Sodium CHELSI; 2000 mL Fluid; RD/LDN may adjust order  Diet effective now        Question Answer Comment   Diet Texture Regular    Sodium Restriction: 3-4gm Sodium CHELSI    Fluid restriction dietary / 24h: 2000 mL Fluid    Delegation of Authority. Diet orders written by PA/CRNPs may not be adjusted by RD/LDNs. RD/LDN may adjust order        03/28/24 0840                    Samy is a 89 y.o. male admitted on 3/25/2024 with Acute congestive heart failure, unspecified heart failure type (CMS/HCC) [I50.9]. Principal problem is Acute congestive heart failure, unspecified heart failure type (CMS/HCC).    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    History of Present Illness  89 y/o male who presented with dyspnea. His nurse aide notes that at times he chokes on food.       CXR with large left and moderate right-sided effusions and pulmonary vascular congestion. Worsening CHF    SLP Pain      Date/Time Pain Type Rating: Rest Rating: Activity [facility]   03/28/24 1215 Pain Assessment 0 - no pain 0 - no pain CK            Prior Living Environment      Flowsheet Row Most Recent Value   Current Living Arrangements home          Prior Level of Function      Flowsheet Row Most Recent Value   Communication understands/communicates without difficulty   Swallowing difficulty swallowing liquids/foods   Baseline Diet/Method of Nutritional Intake thin liquids, regular  [per patient report]   Past History of  Dysphagia Patient reports currently consuming a regular diet with thin liquids at home. Last seen by SLP services 12/2023 cleared for easy to chew 7 diet with thin liquids. Most recent VFSS  1/5/2023 with recommendations for soft and bite sized diet with mildly thick liquids, and advancement to thin liquids using strict single sips as able.   Assistive Device Currently Used at Home scale, wheelchair, walker, standard             SLP Evaluation and Treatment - 03/28/24 1215          SLP Time Calculation    Start Time 1215     Stop Time 1225     Time Calculation (min) 10 min        General Information    Document Type Daily Treatment/Progress Note     Mode of Treatment speech language pathology     Position at Start of Session upright;in bed     Status at Start of Session agreeable to therapy     General Observations of Patient RN cleared pt to be seen for follow-up dysphagia therapy.        Precautions/Limitations/Impairments    Existing Precautions/Restrictions aspiration;fall     Limitations/Impairments safety/cognitive;swallowing        Cognition/Psychosocial    Comment, Cognition AAOx3, communicating appropriately and pleasantly.        GRBAS    Grade 0-->none     Roughness 0-->none     Breathiness 0-->none     Asthenia 0-->none     Strain 0-->none        Functional Communication Measures    FCM: Swallowing 7-->Level 7        General Swallowing Observations    Current Diet/Method of Nutritional Intake thin liquids;regular     Signs/Symptoms of Aspiration (Current Diet) other (see comments)   Continued large left pleural effusion; small right pleural effusion    Respiratory Support (General Swallowing Observations) none     Comment, Secretions/Suctioning unremarkable        Food and Liquid Trials (NIS)    Patient Positioning HOB elevated (specify degrees)     Oral Intake/Feeding Performance independent/appropriate self-feeding skills     Liquid Consistencies Evaluated thin liquids     Thin Liquids intact;straw  sips     Food Consistencies Evaluated regular     Regular WFL;intact     Comment, Oral Phase Patient benefited from set-up assistance with trials. Overall functional oral phase with adequate acceptance, cnotainment, manipulation, transfer, and complete oral clearance of solids and liquids.     Comment, Pharyngeal Phase No overt s/sx aspiration or distress with intake.     Esophageal Phase of Swallow no clinical symptoms     SpO2 Level stable on RA        Swallowing Recommendations    Diet Consistency Recommendations regular;thin liquids     Medication Administration whole with liquid;one at a time     Supervision Level for Intake distant supervision needed     Feeding/Delivery Recommendations allow patient to feed self if maintaining safety;small bites/sips;single cup sips only;stop meal if showing signs of aspiration or fatigue (e.g., coughing, wet voice)     Posture Recommendations fully upright in chair/chair mode of bed     Swallowing Strategies alternate food and liquid intake     Instrumental Assessment Recommendations reassess via non-instrumental clinical swallow evaluation;VFSS (videofluroscopic swallowing study)     Recommend VFSS (Comment) Pt previously declined further instrumental assessment, wishes to discharge home as soon as possible     Oral Hygiene twice daily teeth brushing     Prognosis for Return to Safe Oral Intake fair;anticipate dysphagia will be a chronic issue     Comment, Swallowing Recommendations Continue aspiration precautions, stringent oral hygiene, ST will continue to monitor diet tolerance        Swallowing Intervention    Dysphagia/Swallowing Interventions monitor tolerance of;current diet without evidence of aspiration        Session Outcome    Position at End of Session upright;in bed     Status at End of Session bed alarm on;all needs met     Nursing Notified patient's performance;patient's response to therapy/activity        Plan    Rehab Potential good, to achieve stated  therapy goals     Therapy Frequency 3 times/wk     Planned Therapy Interventions dysphagia therapy                   SLP Discharge Recommendations      Flowsheet Row Most Recent Value   SLP Recommended Discharge Disposition skilled nursing facility, home with assistance at 03/26/2024 0945                 Education Documentation  Diet Modification, taught by Elva Wne CCC-SLP at 3/28/2024  4:02 PM.  Learner: Patient  Readiness: Acceptance  Method: Explanation  Response: Verbalizes Understanding  Comment: Rationale of diet recommendations and aspiration precautions    Signs/Symptoms, taught by Elva Wen CCC-SLP at 3/28/2024  4:02 PM.  Learner: Patient  Readiness: Acceptance  Method: Explanation  Response: Verbalizes Understanding  Comment: Rationale of diet recommendations and aspiration precautions          SLP Goals      Flowsheet Row Most Recent Value   Oral Nutrition/Hydration Goal 1    Activity effective/safe, oral nutrition/hydration, with caregiver assist at 03/26/2024 0945   Time Frame long-term goal (LTG), by discharge at 03/26/2024 0945   Progress/Outcome goal ongoing at 03/28/2024 1214

## 2024-03-28 NOTE — PLAN OF CARE
Care Coordination Discharge Plan Note     Discharge Needs Assessment  Concerns to be Addressed: care coordination/care conferences, discharge planning  Current Discharge Risk:      Anticipated Discharge Plan  Anticipated Discharge Disposition: home with home health  Type of Home Care Services: home OT, home PT, nursing      Patient Choice  Offered/Gave Vendor List: yes  Patient's Choice of Community Agency(s): [facility]    Patient and/or patient guardian/advocate was made aware of their right to choose a provider. A list of eligible providers was presented and reviewed with the patient and/or patient guardian/advocate in written and/or verbal form. The list delineates providers in the patient’s desired geographic area who are participating in the Medicare program and/or providers contracted with the patient’s primary insurance. The Medicare list and quality ratings were obtained from the Medicare.gov [medicare.gov] website.    ---------------------------------------------------------------------------------------------------------------------    Interdisciplinary Discharge Plan Review:  Participants:     Concerns Comments: Spoke patient's son Marko and he states if his father is medically stable tomorrow, he would be able to to get the Caregivers services in place. He also states that one of his brother's will provide his transportation home.CC will continue to follow for transition of care needs until discharge is complete.       Discharge Plan:   Disposition/Destination: Home Health Care - [facility] / Home  Discharge Facility:    Community Resources:      Discharge Transportation:  Is Out of Hospital DNR needed at Discharge:    Does patient need discharge transport?

## 2024-03-28 NOTE — PLAN OF CARE
Plan of Care Review  Plan of Care Reviewed With: patient  Progress: no change  Outcome Evaluation: Pt AAOx4, calm and cooperative w/ care. VSS. Meds given as prescribed. Pt made npo at midnight. Urinal within reach. Call bell within reach, pt sleeping in between care. Bed alarm on.

## 2024-03-29 ENCOUNTER — APPOINTMENT (INPATIENT)
Dept: RADIOLOGY | Facility: HOSPITAL | Age: 89
DRG: 291 | End: 2024-03-29
Attending: PHYSICIAN ASSISTANT
Payer: MEDICARE

## 2024-03-29 ENCOUNTER — APPOINTMENT (INPATIENT)
Dept: RADIOLOGY | Facility: HOSPITAL | Age: 89
DRG: 291 | End: 2024-03-29
Attending: INTERNAL MEDICINE
Payer: MEDICARE

## 2024-03-29 PROBLEM — J18.9 PNEUMONIA: Status: RESOLVED | Noted: 2024-03-26 | Resolved: 2024-03-29

## 2024-03-29 LAB
ALBUMIN SERPL-MCNC: 3.1 G/DL (ref 3.5–5.7)
ALP SERPL-CCNC: 130 IU/L (ref 34–125)
ALT SERPL-CCNC: 6 IU/L (ref 7–52)
ANION GAP SERPL CALC-SCNC: 7 MEQ/L (ref 3–15)
AST SERPL-CCNC: 33 IU/L (ref 13–39)
BACTERIA BLD CULT: NORMAL
BACTERIA BLD CULT: NORMAL
BASOPHILS # BLD: 0.04 K/UL (ref 0.01–0.1)
BASOPHILS NFR BLD: 0.7 %
BILIRUB SERPL-MCNC: 0.6 MG/DL (ref 0.3–1.2)
BUN SERPL-MCNC: 30 MG/DL (ref 7–25)
CALCIUM SERPL-MCNC: 8.1 MG/DL (ref 8.6–10.3)
CHLORIDE SERPL-SCNC: 105 MEQ/L (ref 98–107)
CO2 SERPL-SCNC: 27 MEQ/L (ref 21–31)
CREAT SERPL-MCNC: 1.4 MG/DL (ref 0.7–1.3)
DIFFERENTIAL METHOD BLD: ABNORMAL
EGFRCR SERPLBLD CKD-EPI 2021: 47.2 ML/MIN/1.73M*2
EOSINOPHIL # BLD: 0.16 K/UL (ref 0.04–0.54)
EOSINOPHIL NFR BLD: 2.9 %
ERYTHROCYTE [DISTWIDTH] IN BLOOD BY AUTOMATED COUNT: 16.8 % (ref 11.6–14.4)
FUNGUS STAIN: NORMAL
GLUCOSE SERPL-MCNC: 99 MG/DL (ref 70–99)
HCT VFR BLD AUTO: 31.9 % (ref 40.1–51)
HGB BLD-MCNC: 10.4 G/DL (ref 13.7–17.5)
IMM GRANULOCYTES # BLD AUTO: 0.02 K/UL (ref 0–0.08)
IMM GRANULOCYTES NFR BLD AUTO: 0.4 %
LDH SERPL L TO P-CCNC: 119 IU/L (ref 98–271)
LDH SERPL L TO P-CCNC: 153 IU/L (ref 98–271)
LYMPHOCYTES # BLD: 0.78 K/UL (ref 1.2–3.5)
LYMPHOCYTES NFR BLD: 14.1 %
MCH RBC QN AUTO: 33 PG (ref 28–33.2)
MCHC RBC AUTO-ENTMCNC: 32.6 G/DL (ref 32.2–36.5)
MCV RBC AUTO: 101.3 FL (ref 83–98)
MONOCYTES # BLD: 0.62 K/UL (ref 0.3–1)
MONOCYTES NFR BLD: 11.2 %
NEUTROPHILS # BLD: 3.93 K/UL (ref 1.7–7)
NEUTS SEG NFR BLD: 70.7 %
NRBC BLD-RTO: 0 %
PDW BLD AUTO: 10.4 FL (ref 9.4–12.4)
PLATELET # BLD AUTO: 118 K/UL (ref 150–350)
POTASSIUM SERPL-SCNC: 3.3 MEQ/L (ref 3.5–5.1)
PROT SERPL-MCNC: 7.8 G/DL (ref 6–8.2)
RBC # BLD AUTO: 3.15 M/UL (ref 4.5–5.8)
RHODAMINE-AURAMINE STN SPEC: NORMAL
SODIUM SERPL-SCNC: 139 MEQ/L (ref 136–145)
WBC # BLD AUTO: 5.55 K/UL (ref 3.8–10.5)

## 2024-03-29 PROCEDURE — 21400000 HC ROOM AND CARE CCU/INTERMEDIATE

## 2024-03-29 PROCEDURE — 25800000 HC PHARMACY IV SOLUTIONS: Performed by: STUDENT IN AN ORGANIZED HEALTH CARE EDUCATION/TRAINING PROGRAM

## 2024-03-29 PROCEDURE — 71045 X-RAY EXAM CHEST 1 VIEW: CPT

## 2024-03-29 PROCEDURE — 0W9B3ZZ DRAINAGE OF LEFT PLEURAL CAVITY, PERCUTANEOUS APPROACH: ICD-10-PCS | Performed by: RADIOLOGY

## 2024-03-29 PROCEDURE — 63600000 HC DRUGS/DETAIL CODE: Mod: JZ | Performed by: STUDENT IN AN ORGANIZED HEALTH CARE EDUCATION/TRAINING PROGRAM

## 2024-03-29 PROCEDURE — 80053 COMPREHEN METABOLIC PANEL: CPT

## 2024-03-29 PROCEDURE — 97535 SELF CARE MNGMENT TRAINING: CPT | Mod: GO

## 2024-03-29 PROCEDURE — 99233 SBSQ HOSP IP/OBS HIGH 50: CPT | Performed by: INTERNAL MEDICINE

## 2024-03-29 PROCEDURE — C1729 CATH, DRAINAGE: HCPCS

## 2024-03-29 PROCEDURE — 25000000 HC PHARMACY GENERAL: Performed by: PHYSICIAN ASSISTANT

## 2024-03-29 PROCEDURE — 83615 LACTATE (LD) (LDH) ENZYME: CPT

## 2024-03-29 PROCEDURE — 36415 COLL VENOUS BLD VENIPUNCTURE: CPT

## 2024-03-29 PROCEDURE — 63700000 HC SELF-ADMINISTRABLE DRUG

## 2024-03-29 PROCEDURE — 97162 PT EVAL MOD COMPLEX 30 MIN: CPT | Mod: GP

## 2024-03-29 PROCEDURE — 97166 OT EVAL MOD COMPLEX 45 MIN: CPT | Mod: GO

## 2024-03-29 PROCEDURE — 63700000 HC SELF-ADMINISTRABLE DRUG: Performed by: STUDENT IN AN ORGANIZED HEALTH CARE EDUCATION/TRAINING PROGRAM

## 2024-03-29 PROCEDURE — 85025 COMPLETE CBC W/AUTO DIFF WBC: CPT

## 2024-03-29 PROCEDURE — 36100330 IR THORACENTESIS

## 2024-03-29 PROCEDURE — 97116 GAIT TRAINING THERAPY: CPT | Mod: GP

## 2024-03-29 RX ORDER — LEVOTHYROXINE SODIUM 100 UG/1
100 TABLET ORAL DAILY
Qty: 30 TABLET | Refills: 0 | Status: SHIPPED | OUTPATIENT
Start: 2024-03-29 | End: 2024-07-28

## 2024-03-29 RX ORDER — POTASSIUM CHLORIDE 750 MG/1
40 TABLET, EXTENDED RELEASE ORAL ONCE
Status: COMPLETED | OUTPATIENT
Start: 2024-03-29 | End: 2024-03-29

## 2024-03-29 RX ORDER — METOPROLOL SUCCINATE 25 MG/1
12.5 TABLET, EXTENDED RELEASE ORAL DAILY
Qty: 15 TABLET | Refills: 0 | Status: SHIPPED | OUTPATIENT
Start: 2024-03-29 | End: 2024-07-28

## 2024-03-29 RX ORDER — LIDOCAINE HYDROCHLORIDE 10 MG/ML
INJECTION, SOLUTION INFILTRATION; PERINEURAL
Status: COMPLETED | OUTPATIENT
Start: 2024-03-29 | End: 2024-03-29

## 2024-03-29 RX ADMIN — MIRTAZAPINE 15 MG: 15 TABLET, FILM COATED ORAL at 21:20

## 2024-03-29 RX ADMIN — CEFTRIAXONE SODIUM 1 G: 1 INJECTION, POWDER, FOR SOLUTION INTRAMUSCULAR; INTRAVENOUS at 00:37

## 2024-03-29 RX ADMIN — CLOPIDOGREL 75 MG: 75 TABLET ORAL at 09:23

## 2024-03-29 RX ADMIN — ESCITALOPRAM OXALATE 5 MG: 5 TABLET, FILM COATED ORAL at 09:23

## 2024-03-29 RX ADMIN — POTASSIUM CHLORIDE 40 MEQ: 750 TABLET, EXTENDED RELEASE ORAL at 18:33

## 2024-03-29 RX ADMIN — FUROSEMIDE 40 MG: 10 INJECTION, SOLUTION INTRAMUSCULAR; INTRAVENOUS at 09:22

## 2024-03-29 RX ADMIN — GUAIFENESIN 600 MG: 600 TABLET ORAL at 21:20

## 2024-03-29 RX ADMIN — GUAIFENESIN 600 MG: 600 TABLET ORAL at 09:22

## 2024-03-29 RX ADMIN — LEVOTHYROXINE SODIUM 112 MCG: 0.11 TABLET ORAL at 04:56

## 2024-03-29 RX ADMIN — THIAMINE HCL TAB 100 MG 50 MG: 100 TAB at 09:22

## 2024-03-29 RX ADMIN — FAMOTIDINE 10 MG: 20 TABLET, FILM COATED ORAL at 09:22

## 2024-03-29 RX ADMIN — METOPROLOL SUCCINATE 12.5 MG: 25 TABLET, EXTENDED RELEASE ORAL at 21:20

## 2024-03-29 RX ADMIN — Medication 500 MCG: at 09:23

## 2024-03-29 RX ADMIN — LIDOCAINE HYDROCHLORIDE 7 ML: 10 INJECTION, SOLUTION INFILTRATION; PERINEURAL at 15:15

## 2024-03-29 RX ADMIN — LATANOPROST 1 DROP: 50 SOLUTION/ DROPS OPHTHALMIC at 22:42

## 2024-03-29 RX ADMIN — Medication 500 MG: at 09:22

## 2024-03-29 RX ADMIN — PANTOPRAZOLE SODIUM 20 MG: 20 TABLET, DELAYED RELEASE ORAL at 09:23

## 2024-03-29 ASSESSMENT — COGNITIVE AND FUNCTIONAL STATUS - GENERAL
WALKING IN HOSPITAL ROOM: 3 - A LITTLE
HELP NEEDED FOR BATHING: 2 - A LOT
AFFECT: WFL
TOILETING: 1 - TOTAL
DRESSING REGULAR LOWER BODY CLOTHING: 1 - TOTAL
WALKING IN HOSPITAL ROOM: 3 - A LITTLE
DRESSING REGULAR UPPER BODY CLOTHING: 3 - A LITTLE
MOVING TO AND FROM BED TO CHAIR: 3 - A LITTLE
AFFECT: WFL
STANDING UP FROM CHAIR USING ARMS: 3 - A LITTLE
STANDING UP FROM CHAIR USING ARMS: 3 - A LITTLE
MOVING TO AND FROM BED TO CHAIR: 3 - A LITTLE
EATING MEALS: 3 - A LITTLE
CLIMB 3 TO 5 STEPS WITH RAILING: 3 - A LITTLE
CLIMB 3 TO 5 STEPS WITH RAILING: 2 - A LOT
HELP NEEDED FOR PERSONAL GROOMING: 3 - A LITTLE

## 2024-03-29 NOTE — PROGRESS NOTES
[facility] received consult.  Reviewed case. Spoke with patient's son. Referred patient to [facility] for RN, PT and OT with anticipated discharge 03 29 2024.  Thank you

## 2024-03-29 NOTE — POST-PROCEDURE NOTE
Interventional Radiology Brief Postprocedure Note    Samy Elena     Attending: SOPHIA Andrade MD    Assistant: n/a    Diagnosis: left pleural effusion    Description of procedure: repeat left thoracentesis     Contrast: none     Anesthesia:  Local (Lidocaine)    Volume of Lidocaine Utilized (ml): 7     Medications: none     Complications: None      Estimated Blood Loss: Estimated Blood Loss: 0-10 ml    Anticoagulation: no restrictions    Specimens: 700 mL clear yellow pleural fluid    Findings: successful bedside therapeutic left thoracentesis, removed 700mL, specimen discarded, VSS; of note there is still a moderate size effusion remaining due to patient having difficulty tolerating procedure (feeling short of breath and coughing, requesting to stop), post procedure CXR ordered in 1 hour    3/29/2024 3:30 PM

## 2024-03-29 NOTE — ASSESSMENT & PLAN NOTE
"Nursing reports scant hematuria and small amount of florencio blood from the urethral meatus even without active urination.  No history of this according to the patient.  No Will catheters this admission, he has historically had a Will catheter, most recently a few months ago he says.    -Exam without obvious signs of trauma on the external urethral meatus.  -Patient reports has had some trouble repositioning the plastic urinal, may have \"nicked\" the urethral meatus perhaps    -Okay to continue antiplatelet therapy and DVT prophylaxis  -UA on admission without signs of UTI  -Referral to urology for hematuria workup if aligns with goals of care (89 years) and if this is recurrent  -Hgb stable. No further c/o this on day of dc. UA with no bacteria. Dc abx (post 3 days rocephen)  "

## 2024-03-29 NOTE — PLAN OF CARE
Problem: Adult Inpatient Plan of Care  Goal: Plan of Care Review  Outcome: Progressing  Goal: Patient-Specific Goal (Individualized)  Outcome: Progressing  Goal: Absence of Hospital-Acquired Illness or Injury  Outcome: Progressing  Goal: Optimal Comfort and Wellbeing  Outcome: Progressing  Goal: Readiness for Transition of Care  Outcome: Progressing     Problem: Swallowing Impairment  Goal: Optimal Eating and Swallowing Without Aspiration  Outcome: Progressing     Problem: Skin Injury Risk Increased  Goal: Skin Health and Integrity  Outcome: Progressing     Problem: Fall Injury Risk  Goal: Absence of Fall and Fall-Related Injury  Outcome: Progressing     Problem: Mobility Impairment  Goal: Optimal Mobility  Outcome: Progressing

## 2024-03-29 NOTE — PROGRESS NOTES
03/29/24 1131   Hospital to Home   Patient Enrolled in [facility]? Yes   [facility] Coordinator Comment Spoke w/ pt's son (Marko) to discuss Hospital to Home program. He is agreeable to a phone visit. Telemed appt. with [facility] provider scheduled for Wednesday, April 3, 2024 at 11am. He declined assistance making other follow-up appt's.

## 2024-03-29 NOTE — ASSESSMENT & PLAN NOTE
IR thoracentesis, both diagnostic and therapeutic 3/28 and 3/29 seeing high volume   -CXR much improved post thora  -Lytes criteria transudative, cx pending

## 2024-03-29 NOTE — PLAN OF CARE
Problem: Adult Inpatient Plan of Care  Goal: Plan of Care Review  Outcome: Progressing  Flowsheets (Taken 3/29/2024 0235)  Progress: improving  Outcome Evaluation: AAO4. VSS. Pt denies pain or SOB. Pt 1x assist to turn in bed. incontinence care provided. Continue to diuresis. Continue to give IV abx. Pt needs are met, FSP maintained and call bell in reach  Plan of Care Reviewed With: patient   Plan of Care Review  Plan of Care Reviewed With: patient  Progress: improving  Outcome Evaluation: AAO4. VSS. Pt denies pain or SOB. Pt 1x assist to turn in bed. incontinence care provided. Continue to diuresis. Continue to give IV abx. Pt needs are met, FSP maintained and call bell in reach

## 2024-03-29 NOTE — ASSESSMENT & PLAN NOTE
Presentation: SOLIS, fatigue, abd swelling, and elevated BNP  Last TTE (8/2023): Estimated EF 45 to 50 %. D shaped left ventricular cavity in the setting of severe RVoverload Unable to assess diastolic function due to atrial arrhythmia. The right ventricular cavity is severely dilated with severely decreased systolic function. Nunes's sign is present. Unchanged from prior one from 8/2022.    Cardiologist: Dr. Jeffrey.   Home diuretic: Torsemide 10 mg PO BID  Other GDMT: Metoprolol (specify home dose seeing fills for succinate and tartrate), history of Justin's gangrene with SGLT2i use. Entresto last filled 5/30/2023 (seems to be on ARB alone, likely due to hypotension)    PLAN:  -Mild troponin elevation likely in the setting of demand ischemia due to heart failure exacerbation. STAT EKG and trops for chest pain   -Prior TTE was 7 months ago.  Repeat TTE with unchanged EF, again shows severely dilated RV with decreased RV systolic function (Nunes sign).  This is unchanged from prior echocardiogram. Clinically, he does not present with symptoms of PA such as tachycardia, chest pain, S1Q3T3 on EKG.  History without recent travel, no leg pain, non-smoker.  As such, no indication to check a CTA PE protocol at this time.      -PT/OT cleared for home with home care. Has 24-hour care at home at baseline  -ResumeTorsemide 10mg BID with daily weight monitoring

## 2024-03-29 NOTE — PLAN OF CARE
Problem: Adult Inpatient Plan of Care  Goal: Plan of Care Review  Outcome: Progressing  Flowsheets (Taken 3/29/2024 1158)  Progress: improving  Outcome Evaluation: OT Eval complete. D/c home with 24/7 S and assist at home. HHOT recommended.  Plan of Care Reviewed With: patient

## 2024-03-29 NOTE — DISCHARGE INSTRUCTIONS
Mr. Elena,     You were admitted to [facility] for shortness of breath.  Your x-ray showed recurrence of the left-sided pleural effusion.  You had a similar occurrence of this a few months ago.  You had an ultrasound of your heart that was stable from her previous one.  He was treated with antibiotics and intravenous diuretics without much improvement in that pleural effusion over the first 2 days of your hospitalization.  As such, you had an interventional radiology thoracentesis (needle drainage of the fluid collection) on 3/28 and 3/29 with marked improvement in the size of the effusion.    As we discussed, should your symptoms of shortness of breath recur, please return to the emergency room for further workup and management.  It is quite possible that at some point, the fluid on your lungs will recur and you will require thoracentesis again.    With regard to your chronic medications, please continue your chronic heart medications as you had been taking them prior.    We also checked your thyroid labs.  Last time you are hospitalized, your TSH was low, prompting our team to decrease the dose of your levothyroxine from 137 mcg to 112 mcg.  Your TSH is still quite low, and therefore we are further decreasing your levothyroxine dose to 100mcg.     Follow up:  [  ] With your PCP in 1-2 weeks   - Call IR to schedule a thoracentesis in 2 week ([phone])  - Plan for a TSH with free T4 (thyroid test) in 4-6 weeks   - Seek a referral to urology if you notice blood in your urine again

## 2024-03-29 NOTE — DISCHARGE SUMMARY
Hospital Medicine Service -  Inpatient Discharge Summary        BRIEF OVERVIEW   Admitting Provider: Jostin Lane DO  Attending Provider: Abdi Moses MD Attending phys phone: [phone]    PCP: Zachery Hernandez [phone]    Admission Date: 3/25/2024  Discharge Date: 3/30/2024     DISCHARGE DIAGNOSES      Primary Discharge Diagnosis  Acute congestive heart failure, unspecified heart failure type (CMS/HCC)    Secondary Discharge Diagnoses  Active Hospital Problems    Diagnosis Date Noted   • Acute congestive heart failure, unspecified heart failure type (CMS/HCC) 03/25/2024     Priority: High   • Pleural effusion 08/03/2023     Priority: Medium   • Multiple medical problems 03/26/2024     Priority: Low   • Hematuria 03/28/2024   • Coronary artery disease 08/03/2023   • Atrial fibrillation (CMS/HCC)       Resolved Hospital Problems    Diagnosis Date Noted Date Resolved   • Pneumonia 03/26/2024 03/29/2024     Priority: Medium       Problem List on Day of Discharge  * Acute congestive heart failure, unspecified heart failure type (CMS/HCC)  Assessment & Plan  Presentation: SOLIS, fatigue, abd swelling, and elevated BNP  Last TTE (8/2023): Estimated EF 45 to 50 %. D shaped left ventricular cavity in the setting of severe RVoverload Unable to assess diastolic function due to atrial arrhythmia. The right ventricular cavity is severely dilated with severely decreased systolic function. Nunes's sign is present. Unchanged from prior one from 8/2022.    Cardiologist: Dr. Jeffrey.   Home diuretic: Torsemide 10 mg PO BID  Other GDMT: Metoprolol (specify home dose seeing fills for succinate and tartrate), history of Justin's gangrene with SGLT2i use. Entresto last filled 5/30/2023 (seems to be on ARB alone, likely due to hypotension)    PLAN:  -Mild troponin elevation likely in the setting of demand ischemia due to heart failure exacerbation. STAT EKG and trops for chest pain   -Prior TTE was 7 months ago.   "Repeat TTE with unchanged EF, again shows severely dilated RV with decreased RV systolic function (Nunes sign).  This is unchanged from prior echocardiogram. Clinically, he does not present with symptoms of PA such as tachycardia, chest pain, S1Q3T3 on EKG.  History without recent travel, no leg pain, non-smoker.  As such, no indication to check a CTA PE protocol at this time.      -PT/OT cleared for home with home care. Has 24-hour care at home at baseline  -ResumeTorsemide 10mg BID with daily weight monitoring     Pleural effusion  Assessment & Plan  IR thoracentesis, both diagnostic and therapeutic 3/28 and 3/29 seeing high volume   -CXR much improved post thora  -Lytes criteria transudative, cx pending     Multiple medical problems  Assessment & Plan  #hypothyroidism  Previously maintained on 137 mcg levothyroxine, TSH 0.02 in 11/2023, dose reduced 112 mcg.  TSH 0.03 on this admission, would benefit from further dose reduction to the next dose down with follow-up labs in 4 to 6 weeks with PCP    #depression  -continue home SSRI (escitalopram)    #bacteruria   -Reportedly diagnosed with UTI outpatient but not yet prescribed abx?   -UA unremarkable here on admission  -treated emprically for PNA with Rocephen which would cover common urinary bacteria   -monitor for sx outpt. Continue hiprex.       Hematuria  Assessment & Plan  Nursing reports scant hematuria and small amount of florencio blood from the urethral meatus even without active urination.  No history of this according to the patient.  No Will catheters this admission, he has historically had a Will catheter, most recently a few months ago he says.    -Exam without obvious signs of trauma on the external urethral meatus.  -Patient reports has had some trouble repositioning the plastic urinal, may have \"nicked\" the urethral meatus perhaps    -Okay to continue antiplatelet therapy and DVT prophylaxis  -UA on admission without signs of UTI  -Referral to " "urology for hematuria workup if aligns with goals of care (89 years) and if this is recurrent  -Hgb stable. No further c/o this on day of dc. UA with no bacteria. Dc abx (post 3 days rocephen)    Coronary artery disease  Assessment & Plan  CAD post CABG in 1996    -Continue home plavix   -Presentation inconsistent with ACS, trop elevation presumbaly in the setting of ADHF      Atrial fibrillation (CMS/[facility])  Assessment & Plan  Continue metoprolol  He is off any anticoagulation due to his GI bleed   Did not want to pursue NISH closure device in the past and is rate controlled now      Spent > 30 mins on d/c  SUMMARY OF HOSPITALIZATION      Presenting Problem/History of Present Illness    HPI per the admission H&P by Dr. Lane    \"Patient is a 89 y/o male w/ hx of HFrEF, AF not on AC, HTN, hx diverticulitis s/p colostomy, and recurrent pleural effusions requiring left-sided thoracentesis who presented with dyspnea.      In the ED, VSS and saturating well on RA. Labs significan for Cr 1.5 (b/l 1.4-1.7), , LA 2.0, HS trop 38.5, , WBC 5.4, Hb 10.5, COVID/flu/RSV negative.   CXR with large left and moderate right-sided effusions and pulmonary vascular congestion.   He was given IV lasix 40mg IV in ED.       He is accompanied by his nurse aide and son, he states for the last week he has had worsening dyspnea, abdominal swelling/protrusion, fatigue, cough, and fevers. His nurse aide notes that at times he chokes on food.   He drinks a lot of fluids, and they suspect this is the cause of his recurrent fluid build-up and CHF exacerbations.      Patient appears well, in NAD.\"    Hospital Course    See the attached problem list for further detail.  Briefly, he was admitted to [facility] for the management of pleural effusion (left) after initial presentation with dyspnea.  He was given IV diuresis and antibiotics empirically see acute presentation with fever and sputum production prior to admission.  Seeing minimal " improvement in effusions with diuresis and antibiotics, ultimately proceeded with IR Thora 3/28.  On 3/28, 1.3 L was drained and the second subsequent drainage was planned for the day following due to the remaining volume.  IR Thora 3/29 was uneventful, with 700cc output.  At the conclusion of the procedure, patient complained of some shortness of breath.  Seeing this, despite additional fluid remaining, provider elected to stop for thoracentesis at that time.  Seeing remaining moderate-sized pleural effusion, will refer to IR in 2 weeks for possible repeat therapeutic thoracentesis.  He was educated to return to the hospital if shortness of breath returns prior to then.     PT/OT worked with the patient and recommended home with home health.  The patient has already establish 24 hours of home care prior to admission that we will resume upon discharge.    Pleural fluid is transudative. Cx pending, will forward to PC of record, who he follows closely with.     Important Issues to Address in Follow-Up  Follow up with PCP in 1-2 weeks   Follow up with IR in 2 weeks for draining of recurrent effusion   PCP to follow-up pending pleural fluid cultures    Exam on Day of Discharge  Physical Exam  Vitals and nursing note reviewed.   Cardiovascular:      Rate and Rhythm: Normal rate and regular rhythm.      Pulses: Normal pulses.      Heart sounds: No murmur heard.  Pulmonary:      Breath sounds: No wheezing.      Comments: Diminished at the left base and left upper lobe  Abdominal:      General: Bowel sounds are normal.      Palpations: Abdomen is soft.      Tenderness: There is no abdominal tenderness.      Comments: Ostomy   Musculoskeletal:      Right lower leg: No edema.      Left lower leg: No edema.   Skin:     General: Skin is warm and dry.      Capillary Refill: Capillary refill takes less than 2 seconds.   Neurological:      Mental Status: He is alert and oriented to person, place, and time.   Psychiatric:          Mood and Affect: Mood normal.         Consults During Admission  IP CONSULT TO CASE MANAGEMENT  IP CONSULT TO NUTRITION SERVICES    DISCHARGE MEDICATIONS               Medication List        ASK your doctor about these medications      calcium carbonate 200 mg calcium (500 mg) chewable tablet  Commonly known as: TUMS  Take 1 tablet by mouth 2 (two) times a day.  Dose: 1 tablet     clopidogreL 75 mg tablet  Commonly known as: PLAVIX  Take 75 mg by mouth every morning.  Dose: 75 mg     cyanocobalamin 500 mcg tablet  Commonly known as: VITAMIN B12  Take 500 mcg by mouth every morning.  Dose: 500 mcg     escitalopram 5 mg tablet  Commonly known as: LEXAPRO  Take 5 mg by mouth daily.  Dose: 5 mg     famotidine 10 mg tablet  Commonly known as: PEPCID  Take 1 tablet (10 mg total) by mouth daily Indications: gastroesophageal reflux disease.  Dose: 10 mg     levothyroxine 112 mcg tablet  Commonly known as: SYNTHROID  Take 1 tablet (112 mcg total) by mouth daily.  Dose: 112 mcg     lidocaine 4 % adhesive patch,medicated topical patch  Commonly known as: ASPERCREME  Apply 1 patch topically daily as needed. Remove & discard patch within 12 hours or as directed by prescriber.  Dose: 1 patch     LOTEMAX 0.5 % ointment  Apply 1 Application to left eye 3 (three) times a day.  Dose: 1 Application  Generic drug: loteprednol etabonate     LUMIGAN 0.01 % ophthalmic drops  Administer 1 drop into the left eye nightly.  Dose: 1 drop  Generic drug: bimatoprost     magnesium oxide 400 mg (241.3 mg magnesium) tablet  Commonly known as: MAG-OX  Take 400 mg by mouth 2 (two) times a day.  Dose: 400 mg     melatonin 3 mg tablet  Take 3 mg by mouth nightly.  Dose: 3 mg     methenamine 1 gram tablet  Commonly known as: HIPREX  Take 1 g by mouth daily.  Dose: 1 g     metoprolol succinate XL 25 mg 24 hr tablet  Commonly known as: TOPROL-XL  Take 0.5 tablets (12.5 mg total) by mouth daily.  Dose: 12.5 mg     potassium chloride 10 mEq CR  tablet  Commonly known as: KLOR-CON  Take 20 mEq by mouth 2 (two) times a day.  Dose: 20 mEq     PriLOSEC OTC 20 mg EC tablet  Take 20 mg by mouth daily before breakfast.  Dose: 20 mg  Generic drug: omeprazole OTC     thiamine 50 mg tablet  Take 50 mg by mouth daily.  Dose: 50 mg     torsemide 10 mg tablet  Commonly known as: DEMADEX  Take 10 mg by mouth daily.  Dose: 10 mg  Ask about: Which instructions should I use?                      PROCEDURES / LABS / IMAGING      Operative Procedures  None     Other Procedures  IR Thora 3/28 1.3 L out  IR Thora 3/29 700 mL out    Pertinent Labs  Results from last 7 days   Lab Units 03/29/24  1246 03/28/24  1416 03/26/24  0611   WBC K/uL 5.55 4.93 4.26   HEMOGLOBIN g/dL 10.4* 9.9* 9.7*   HEMATOCRIT % 31.9* 30.2* 31.1*   PLATELETS K/uL 118* 121* 116*     Results from last 7 days   Lab Units 03/29/24  1246 03/28/24  1416 03/26/24  0611   SODIUM mEQ/L 139 137 140   POTASSIUM mEQ/L 3.3* 3.6 3.5   CHLORIDE mEQ/L 105 104 106   CO2 mEQ/L 27 22 27   BUN mg/dL 30* 32* 25   CREATININE mg/dL 1.4* 1.6* 1.6*   GLUCOSE mg/dL 99 162* 125*   CALCIUM mg/dL 8.1* 8.2* 8.3*         SARS-CoV-2 (COVID-19) (no units)   Date/Time Value   03/25/2024 2133 Negative       Pertinent Imaging  IR THORACENTESIS    Result Date: 3/28/2024  IMPRESSION: Successful ultrasound-guided left thoracentesis with 1.3 L pleural fluid removed with specimens sent to the lab for analysis.  All components of the time-out, debriefing, and handling of the specimen were conducted as per the ASAP Specimen Protocol. I certify that I have personally reviewed this examination and agree with Betsy Sandoval PA-C's report. Gigi Berry M.D.    X-RAY CHEST 1 VIEW    Result Date: 3/28/2024  IMPRESSION: No evidence of pneumothorax status post left thoracentesis.    X-RAY CHEST 2 VIEWS    Result Date: 3/27/2024  IMPRESSION: No interval change in large left pleural effusion and small right pleural effusion    X-RAY CHEST 1 VIEW    Result  Date: 3/26/2024  IMPRESSION: Worsening CHF. Recommend follow-up radiographs or chest CT in 6-8 weeks.     OUTPATIENT  FOLLOW-UP / REFERRALS / PENDING TESTS        Outpatient Follow-Up Appointments            In 5 days HEIDI Stone [facility]    In 10 months Mat Bryant MD [facility]            Referrals  No orders of the defined types were placed in this encounter.      Test Results Pending at Discharge  Unresulted Labs (From admission, onward)       Start     Ordered    03/29/24 0832  Comprehensive metabolic panel  Once        Question:  Release to patient  Answer:  Immediate    03/29/24 0832    03/29/24 0832  Lactate dehydrogenase  Once        Question:  Release to patient  Answer:  Immediate    03/29/24 0832    03/28/24 1516  Fungal culture / smear Pleural Fluid, Left  (Thoracentesis Left Diagnostic with labs in IR)  Once        Question Answer Comment   Collect in IR Yes    Release to patient Immediate        03/28/24 1515    03/28/24 1516  AFB culture Pleural Fluid, Left  (Thoracentesis Left Diagnostic with labs in IR)  Once        Question Answer Comment   Collect in IR Yes    Release to patient Immediate        03/28/24 1515    03/28/24 1516  Cytology, Fluids Pleural Fluid, Left  (Thoracentesis Left Diagnostic with labs in IR)  Once        Question Answer Comment   Specimen Source Pleural Fluid, Left    Collect in IR Yes    Release to patient Immediate        03/28/24 1515    03/28/24 1516  Fungal Culture Pleural Fluid, Left  PROCEDURE ONCE        Question:  Release to patient  Answer:  Immediate    03/28/24 1515    03/28/24 1358  CBC and differential  Daily      Question:  Release to patient  Answer:  Immediate   Order ID Start Status   [id number] 03/29/24 0600 Needs to be Collected    03/30/24 0600 Scheduled    03/31/24 0600 Scheduled    04/01/24 0600 Scheduled    04/02/24 0600 Scheduled    04/03/24 0600 Scheduled       03/28/24  1357    03/26/24 0024  Sputum culture / smear Expectorated Sputum  Once        Question:  Release to patient  Answer:  Immediate    03/26/24 0023 03/26/24 0024  Sputum Gram Stain (Lab Only) Expectorated Sputum  PROCEDURE ONCE        Question:  Release to patient  Answer:  Immediate    03/26/24 0023                        DISCHARGE DISPOSITION AND DESTINATION      Disposition: [facility]  Destination: Home                            Code Status At Discharge: DNR (A.N.D.)    Physician Order for Life-Sustaining Treatment Document Status        No documents found

## 2024-03-29 NOTE — ASSESSMENT & PLAN NOTE
CAD post CABG in 1996    -Continue home plavix   -Presentation inconsistent with ACS, trop elevation presumbaly in the setting of ADHF

## 2024-03-29 NOTE — OR SURGEON
Pre-Procedure patient identification:  I am the primary operating surgeon/proceduralist and I have reviewed the applicable pathology reports and radiology studies for this procedure. I have identified the patient and confirmed laterality is left on 03/29/24 at 3:29 PM SOPHIA Andrade C

## 2024-03-29 NOTE — PLAN OF CARE
Problem: Adult Inpatient Plan of Care  Goal: Plan of Care Review  Outcome: Progressing  Flowsheets (Taken 3/29/2024 7197)  Progress: no change  Outcome Evaluation: PT eval complete. Rec home w/ 24 hour assist for all mobility to maintain safety and home health.  Plan of Care Reviewed With: patient     Problem: Mobility Impairment  Goal: Optimal Mobility  Outcome: Progressing

## 2024-03-29 NOTE — ASSESSMENT & PLAN NOTE
#hypothyroidism  Previously maintained on 137 mcg levothyroxine, TSH 0.02 in 11/2023, dose reduced 112 mcg.  TSH 0.03 on this admission, reduce dose to 100 mcg and recommend follow-up labs in 4 to 6 weeks with PCP    #depression  -continue home SSRI (escitalopram)    #bacteruria   -Reportedly diagnosed with UTI outpatient but not yet prescribed abx?   -UA unremarkable here on admission  -treated emprically for PNA with Rocephen which would cover common urinary bacteria   -monitor for sx outpt. Continue hiprex.

## 2024-03-29 NOTE — PROGRESS NOTES
Physical Therapy -  Initial Evaluation     Patient: Samy Elena  Location: [place] [address]  MRN: [id number]  Today's date: 3/29/2024    HISTORY OF PRESENT ILLNESS     Samy is a 89 y.o. male admitted on 3/25/2024 with Acute congestive heart failure, unspecified heart failure type (CMS/HCC) [I50.9]. Principal problem is Acute congestive heart failure, unspecified heart failure type (CMS/HCC).    Past Medical History  Samy has a past medical history of Aneurysm of internal iliac artery (CMS/HCC), Atrial fibrillation (CMS/HCC), CHF (congestive heart failure) (CMS/[facility]), 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[facility]), Coronary artery disease, Disease of thyroid gland, Will catheter in place, GI (gastrointestinal bleed), Hypertension, Myocardial infarction (CMS/HCC), Orthostatic hypotension, and TIA (transient ischemic attack).    History of Present Illness  89 y/o male who presented with dyspnea. His nurse aide notes that at times he chokes on food.       CXR with large left and moderate right-sided effusions and pulmonary vascular congestion. Worsening CHF  PRIOR LEVEL OF FUNCTION AND LIVING ENVIRONMENT     Prior Level of Function      Flowsheet Row Most Recent Value   Ambulation assistive equipment   Transferring assistive equipment   Toileting assistive person   Bathing assistive person   Dressing assistive person   Eating independent   IADLs assistive person   Communication understands/communicates without difficulty   Swallowing difficulty swallowing liquids/foods   Baseline Diet/Method of Nutritional Intake thin liquids, regular  [per patient report]   Past History of Dysphagia Patient reports currently consuming a regular diet with thin liquids at home. Last seen by SLP services 12/2023 cleared for easy to chew 7 diet with thin liquids. Most recent VFSS  1/5/2023 with recommendations for soft and bite sized diet with mildly thick liquids, and advancement to thin liquids using strict single sips  as able.   Prior Level of Function Comment pt has a HHA 24/7, assists him w/ all ADLs, ambulates w/ RW min supervision   Assistive Device Currently Used at Home bath bench, walker, front-wheeled, hospital bed, ramps          Prior Living Environment      Flowsheet Row Most Recent Value   People in Home other (see comments)  [HHA]   Current Living Arrangements home   Living Environment Comment pt has a 24/7 HHA, lives in a 2SH w/ a ramp to enter. has first floor set up w/ hospital bed, but does occasionally ascends/descends stairs, usually w/ home PT. bathroom has a tub shower.          VITALS AND PAIN     PT Vitals      Date/Time Pulse HR Source SpO2 Pt Activity O2 Therapy BP Who   03/29/24 0822 78 Monitor 97 % At rest None (Room air) 111/56 SE   03/29/24 0846 91 Monitor 98 % At rest None (Room air) 133/70 SE             Objective   OBJECTIVE     Start time:  0822  End time:  0846  Session Length: 24 min  Mode of Treatment: co-treatment, physical therapy (safety, eval)    General Observations  Patient received supine, in bed. He was agreeable to therapy, no issues or concerns identified by nurse prior to session.      Precautions: fall              PT Eval and Treat - 03/29/24 0822          Cognition    Orientation Status oriented x 3     Affect/Mental Status WFL     Follows Commands WFL     Comment, Cognition pleasant and cooperative throughout, able to make needs known        Sensory Assessment    Sensory Assessment sensation intact, lower extremities        Lower Extremity Assessment    LE Assessment ROM and Strength WFL        Bed Mobility    Bed Mobility Activities supine to sit     [place] supervision     Safety/Cues increased time to complete     Assistive Device bed rails;head of bed elevated     Comment demonstrated good initiation to shift trunk to upright, bring BLE over EOB, and shift hips to EOB        Mobility Belt    Mobility Belt Used for All Out of Bed Activity no     Reason Mobility Belt Not  "Used medical contraindication     Reason Mobility Belt Not Used colostomy        Sit/Stand Transfer    Surface edge of bed;elevated bed     [place] close supervision;minimum assist (75% or more patient effort);1 person assist     Safety/Cues increased time to complete;verbal cues;sequencing;technique     Assistive Device walker, front-wheeled     Transfer Comments STS x 3, one from elevated bed. for all trials pt demonstrated good initiation to clear buttocks from EOB and achieve upright, from standard height bed required increased time to complete and demonstrated increased effort. after first trial pt quickly demonstrated one posterior LOB, requiring min A x 1 to control to sitting. pt demonstrated good insight for proper foot position, given multiple VCs to keep weight forward, \"nose over toes.\" pt able to correct, but still demonstrated increased effort. transfer from elevated bed was smoother and pt completed much easier.        Gait Training    [place], Gait minimum assist (75% or more patient effort);1 person assist     Safety/Cues increased time to complete;verbal cues;sequencing;technique     Assistive Device walker, front-wheeled     Distance in Feet 80 feet     Pattern step-through     Deviations/Abnormal Patterns gait speed decreased;base of support, narrow;step length decreased     Comment (Gait/Stairs) pt able to take slow, short, unsteady steps w/ RW. demonstrated narrow WILMAR and increased cervical flexion throughout, able to correct w/ VCs but required constant cueing to maintain. pt demonstrated one posterior LOB requiring min A x 1 to correct, given extenstive education on importance of keeping weight forward, pt reported he understood, although required multiple VCs to maintain anterior weight shift. distance limited by decreased functional endurance        Stairs Training    [place], Stairs minimum assist (75% or more patient effort);1 person assist     Safety/Cues increased time to " "complete;verbal cues;sequencing;technique     Assistive Device railing     Handrail Location (Stairs) both sides     Number of Stairs 3     Ascending Stairs Technique step-to-step     Descending Stairs Technique step-to-step     Comment simulated stair trial on step stool. pt able to ascend/descend steps slowly, but is unsteady. when ascending first step, pt demonstrated min posterior lean, but no LOB. for the second step, pt demonstrated increased knee flexion and signifcant posterior lean, resulting in one posteior LOB requiring min A x 1 to correct. pt given extensive education on keeping weight forward before ascending, \"nose over toes\" and to increase knee extension when at the top of the step. pt demonstrated understanding as he increased anterior weight shift and knee extension when ascending.        Balance    Static Sitting Balance WFL     Dynamic Sitting Balance WFL     Sit to Stand Dynamic Balance mild impairment     Static Standing Balance mild impairment     Dynamic Standing Balance mild impairment     Comment, Balance pt is able to maintain seated balance at EOB using BUE for support at times w/ supervision. pt requires CS/min A x 1 for STS and min A x 1 for ambulation w/ RW and ascending/descending stairs as he demonstrates significant posterior lean resulting in multiple posterior LOB requiring assist to correct.                                    Education Documentation  Home Safety, taught by Nathaly Chappell at 3/29/2024 11:39 AM.  Learner: Patient  Readiness: Acceptance  Method: Explanation  Response: Needs Reinforcement  Comment: safety w/ mobility        Session Outcome  Patient upright, in chair at end of session, chair alarm on, all needs met, call light in reach, personal items in reach. Nursing notified about patient's performance, patient's position, and patient's response to therapy/activity.    AM-PAC™ - Mobility (Current Function)     Turning form your back to your side while in flat bed " without using bedrails 3 - A Little   Moving from lying on your back to sitting on the side of a flat bed without using bedrails 3 - A Little   Moving to and from a bed to a chair 3 - A Little   Standing up from a chair using your arms 3 - A Little   To walk in a hospital room 3 - A Little   Climbing 3-5 steps with a railing 3 - A Little   AM-PAC™ Mobility Score 18      ASSESSMENT AND PLAN     Progress Summary  pt requires supervision for bed mobility, CS/min A x 1 for STS, and min A x 1 for ambulation w/ RW and ascending/descending stairs. pt is limited by balance deficits and decreased functional endurance. Rec home w/ 24 hour assist for all mobility to maintain safety and home health when stable for d/c.    Patient/Family Therapy Goals Statement: return to PLOF    PT Plan      Flowsheet Row Most Recent Value   Rehab Potential good, to achieve stated therapy goals at 03/29/2024 0822   Therapy Frequency 3 times/wk at 03/29/2024 0822   Planned Therapy Interventions balance training, bed mobility training, gait training, home exercise program, stair training, transfer training at 03/29/2024 0822            PT Discharge Recommendations      Flowsheet Row Most Recent Value   PT Recommended Discharge Disposition home with assistance, home with home health  [24 hour assist for all mobility to maintain safety] at 03/29/2024 0822   Anticipated Equipment Needs if Discharged Home (PT) none  [pt owns all necessary equipment] at 03/29/2024 0822                 PT Goals      Flowsheet Row Most Recent Value   Bed Mobility Goal 1    Activity/Assistive Device bed mobility activities, all at 03/29/2024 0822   [place] modified independence at 03/29/2024 0822   Time Frame by discharge at 03/29/2024 0822   Progress/Outcome goal ongoing at 03/29/2024 0822   Transfer Goal 1    Activity/Assistive Device all transfers at 03/29/2024 0822   [place] supervision required at 03/29/2024 0822   Time Frame by discharge at 03/29/2024  0822   Progress/Outcome goal ongoing at 03/29/2024 0822   Gait Training Goal 1    Activity/Assistive Device gait (walking locomotion) at 03/29/2024 0822   Hand supervision required at 03/29/2024 0822   Distance 150ft at 03/29/2024 0822   Time Frame by discharge at 03/29/2024 0822   Progress/Outcome goal ongoing at 03/29/2024 0822   Stairs Goal 1    Activity/Assistive Device stairs, all skills at 03/29/2024 0822   Hand supervision required at 03/29/2024 0822   Number of Stairs 10 at 03/29/2024 0822   Time Frame by discharge at 03/29/2024 0822   Progress/Outcome goal ongoing at 03/29/2024 0822

## 2024-03-29 NOTE — ASSESSMENT & PLAN NOTE
Suspect CXR findings predominately relate to volume overload, however given hx of aspiration, fevers, and productive cough will plan to treat with CTX and check sputum cultures    -blood cultures no growth  -SLP to eval for dysphagia cleared for reg with thins   -ceftriaxone empirically seeing fevers >101 Fahrenheit at home prior to admission despite lack of fevers here and no leukocytosis, completed 3 days. Discharge OFF abx.

## 2024-03-29 NOTE — PROGRESS NOTES
Occupational Therapy -  Initial Evaluation     Patient: Samy Elena  Location: [place] [address]  MRN: [id number]  Today's date: 3/29/2024    HISTORY OF PRESENT ILLNESS     Samy is a 89 y.o. male admitted on 3/25/2024 with Acute congestive heart failure, unspecified heart failure type (CMS/HCC) [I50.9]. Principal problem is Acute congestive heart failure, unspecified heart failure type (CMS/HCC).    Past Medical History  Samy has a past medical history of Aneurysm of internal iliac artery (CMS/HCC), Atrial fibrillation (CMS/HCC), CHF (congestive heart failure) (CMS/[facility]), 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[facility]), Coronary artery disease, Disease of thyroid gland, Will catheter in place, GI (gastrointestinal bleed), Hypertension, Myocardial infarction (CMS/HCC), Orthostatic hypotension, and TIA (transient ischemic attack).    History of Present Illness  89 y/o male who presented with dyspnea. His nurse aide notes that at times he chokes on food.       CXR with large left and moderate right-sided effusions and pulmonary vascular congestion. Worsening CHF  PRIOR LEVEL OF FUNCTION AND LIVING ENVIRONMENT     Prior Level of Function      Flowsheet Row Most Recent Value   Dominant Hand right   Ambulation assistive equipment   Transferring assistive equipment   Toileting assistive person   Bathing assistive equipment and person   Dressing assistive person   Eating independent   IADLs assistive person   Driving/Transportation friends/family   Communication understands/communicates without difficulty   Swallowing difficulty swallowing liquids/foods   Baseline Diet/Method of Nutritional Intake thin liquids, regular  [per patient report]   Past History of Dysphagia Patient reports currently consuming a regular diet with thin liquids at home. Last seen by SLP services 12/2023 cleared for easy to chew 7 diet with thin liquids. Most recent VFSS  1/5/2023 with recommendations for soft and bite sized diet  with mildly thick liquids, and advancement to thin liquids using strict single sips as able.   Prior Level of Function Comment pt has a HHA 24/7, assists him w/ all ADLs, ambulates w/ RW min- supervision. Keeps urinal at bedside. Caregivers manage colostomy but pt able to feed self and complete oral care. Pt notes often has posterior LOB at baseline. A for all iADLs.   Assistive Device Currently Used at Home bath bench, walker, front-wheeled, hospital bed, ramps, wheelchair          Prior Living Environment      Flowsheet Row Most Recent Value   People in Home other (see comments)   Current Living Arrangements home   Living Environment Comment pt has a 24/7 HHA, lives in a 2SH w/ a ramp to enter. has first floor set up w/ hospital bed, but does occasionally ascends/descends stairs, usually w/ home PT. bathroom has a tub shower with tub bench.          Occupational Profile      Flowsheet Row Most Recent Value   Environmental Supports and Barriers 2 sons supportive and assist, 24/7 HH aides          VITALS AND PAIN     OT Vitals      Date/Time Pulse HR Source SpO2 Pt Activity O2 Therapy BP Who   03/29/24 0846 91 Monitor 98 % At rest None (Room air) 133/70 SE             Objective   OBJECTIVE     Start time:  0826  End time:  0852  Session Length: 26 min  Mode of Treatment: co-treatment, occupational therapy (expedite d/c)    General Observations  Patient received supine, in bed. He was no issues or concerns identified by nurse prior to session, agreeable to therapy. awake and alert, agreeable and pleasant    Precautions: fall       Limitations/Impairments: safety/cognitive, swallowing   Services  Do You Speak a Language Other Than English at Home?: no      OT Eval and Treat - 03/29/24 0826          Cognition    Orientation Status oriented x 3     Affect/Mental Status WFL     Follows Commands WFL;over 90% accuracy;repetition of directions required;verbal cues/prompting required     Cognitive Function  "WFL;safety deficit     Safety Deficit minimal deficit;insight into deficits/self-awareness;judgment;safety precautions follow-through/compliance;safety precautions awareness     Comment, Cognition Pleasant and cooperative. Follows commands and make needs known. VCs for optimal safety with fxnl activity and repetition of cues for enhanced carryover. Mild safety deficit but appears at baseline.        Vision Assessment/Intervention    Vision Assessment WFL        Hearing Assessment    Hearing Status hearing aid, bilateral        Sensory Assessment    Sensory Assessment sensation intact, upper extremities        Upper Extremity Assessment    UE Assessment ROM and Strength WFL        Bed Mobility    Bed Mobility Activities supine to sit     Safety/Cues increased time to complete     Assistive Device bed rails;head of bed elevated     Comment has hospital bed at home. S with inc time and cues to complete.        Mobility Belt    Mobility Belt Used for All Out of Bed Activity no     Reason Mobility Belt Not Used medical contraindication     Reason Mobility Belt Not Used colostomy        Sit/Stand Transfer    Surface edge of bed;chair with arm rests     [place] close supervision;minimum assist (75% or more patient effort);1 person assist     Safety/Cues verbal cues;sequencing;technique;hand placement;preparatory posture     Assistive Device walker, front-wheeled     Transfer Comments STS x3, pt with posterior LOB on initial stand. On 2nd stand from EOB , elevated bed height and enhanced transfer tech used. Pt requires cues for \"nose over toes\" and anterior lean. Close S from recliner chair.        Surface-to-Surface Transfers    Transfer Location bed to chair     Transfer Technique stand step     [place] close supervision;1 person assist     Safety/Cues verbal cues     Assistive Device walker, front-wheeled     Transfer Comments G eccentric control with transfer, cues for proper hand placement.        Functional " Mobility    Distance in room/bathroom;hallway     Functional Mobility [place] close supervision;minimum assist (75% or more patient effort);1 person assist     Safety/Cues sequencing;technique;proper use of assistive device;verbal cues     Assistive Device walker, front-wheeled     Functional Mobility Comments Close S-min A t/o with 1 posterior LOB while turning and talking requiring mod A to correct. Pt requires cues for wider WILMAR and eyes/head up as pt often looks down and has narrow WILMAR. Pt with few mild LOB and reports at baseline HH aides are always with him and provide hands on assist. Extensive education on balance training and proper transfer technique provided to prevent fall risk.        Upper Body Dressing    Tasks don;hospital gown     [place] minimum assist (75% or more patient effort)        Lower Body Dressing    Tasks don;shoes/slippers;socks;doff     [place] dependent (less than 25% patient effort)     Position edge of bed sitting     Adaptive Equipment none     Comment doff hospital socks and don sneakers at EOB. HH aides assist at baseline.        Grooming    Tasks washes, rinses and dries face;washes, rinses and dries hands;oral care (brushing teeth, cleaning dentures)     [place] set up     Position supported sitting     Adaptive Equipment none        Balance    Static Sitting Balance WFL     Dynamic Sitting Balance WFL     Sit to Stand Dynamic Balance mild impairment     Static Standing Balance mild impairment     Dynamic Standing Balance mild impairment     Comment, Balance benefits from RW support in stance. Posterior LOB with transfers and during fxnl mobilityx1 requiring assist to correct. Close S-min A for all fxnl mobility which is pt baseline. Cues for enhanced posture and balance support.        Impairments/Safety Issues    Impairments Affecting Function balance;cognition;endurance/activity tolerance     Cognitive Impairments, Safety/Performance insight into  deficits/self-awareness;problem-solving/reasoning;safety precaution follow-through;safety precaution awareness     Functional Endurance appears at fxnl baseline. Balance deficits noted with fxnl mobility. VSS     Safety Issues Affecting Function insight into deficits/self-awareness;judgment;safety precaution awareness;safety precautions follow-through/compliance     Comment, Safety Issues/Impairments cues for optimal safety.                                    Education Documentation  Activity, taught by Ivy Nguyen OT at 3/29/2024  1:55 PM.  Learner: Patient  Readiness: Acceptance  Method: Explanation  Response: Verbalizes Understanding  Comment: OT role, home safety, transfer techniques, balance training, fall prevention, ADL modifciations and call bell use.    Safety, taught by Ivy Nguyen OT at 3/29/2024  1:55 PM.  Learner: Patient  Readiness: Acceptance  Method: Explanation  Response: Verbalizes Understanding  Comment: OT role, home safety, transfer techniques, balance training, fall prevention, ADL modifciations and call bell use.    Medication Management, taught by Ivy Nguyen OT at 3/29/2024  1:55 PM.  Learner: Patient  Readiness: Acceptance  Method: Explanation  Response: Verbalizes Understanding  Comment: OT role, home safety, transfer techniques, balance training, fall prevention, ADL modifciations and call bell use.    Home Safety, taught by Ivy Nguyen OT at 3/29/2024  1:55 PM.  Learner: Patient  Readiness: Acceptance  Method: Explanation  Response: Verbalizes Understanding  Comment: OT role, home safety, transfer techniques, balance training, fall prevention, ADL modifciations and call bell use.        Session Outcome  Patient upright, in chair at end of session, chair alarm on, all needs met, call light in reach, personal items in reach. Nursing notified about change in vital signs, patient's performance, patient's position, and patient's response to  therapy/activity.    AM-PAC™ - ADL (Current Function)     Putting on/taking off regular lower body clothing 1 - Total   Bathing 2 - A Lot   Toileting 1 - Total   Putting on/taking off regular upper body clothing 3 - A Little   Help for taking care of personal grooming 3 - A Little   Eating meals 3 - A Little   AM-PAC™ ADL Score 13      ASSESSMENT AND PLAN     Progress Summary  OT eval complete. Pt appears at functional and cognitive baseline. S-min A fxnl mobility and transfers with RW support. 1 LOB posteriorly with mobility requiring mod A to correct. Pt total assist to don sneakers. Set up groooming/oral care in sitting. Pt is safe to d/c home with continued 24/7 HH aide assist, family assist and HHOT.    Patient/Family Therapy Goal Statement: go home and continue home therapy    OT Plan      Flowsheet Row Most Recent Value   Rehab Potential good, to achieve stated therapy goals at 03/29/2024 0826   Therapy Frequency 3 times/wk at 03/29/2024 0826   Planned Therapy Interventions activity tolerance training, BADL retraining, occupation/activity based interventions, adaptive equipment training, patient/caregiver education/training, transfer/mobility retraining, functional balance retraining at 03/29/2024 0826            OT Discharge Recommendations      Flowsheet Row Most Recent Value   OT Recommended Discharge Disposition home with assistance, home with home health at 03/29/2024 0826   Anticipated Equipment Needs if Discharged Home (OT) none  [has all necessary DME] at 03/29/2024 0826                 OT Goals      Flowsheet Row Most Recent Value   Transfer Goal 1    Activity/Assistive Device all transfers at 03/29/2024 0826   [place] supervision required at 03/29/2024 0826   Time Frame by discharge at 03/29/2024 0826   Dressing Goal 1    Activity/Adaptive Equipment upper body dressing at 03/29/2024 0826   [place] minimum assist (75% or more patient effort) at 03/29/2024 0826   Time Frame by discharge at  03/29/2024 0826   Grooming Goal 1    Activity/Assistive Device grooming skills, all at 03/29/2024 0826   [place] supervision required, set-up required at 03/29/2024 0826   Time Frame by discharge at 03/29/2024 0826

## 2024-03-29 NOTE — PLAN OF CARE
Care Coordination Discharge Plan Note     Discharge Needs Assessment  Concerns to be Addressed: care coordination/care conferences, discharge planning  Current Discharge Risk: chronically ill    Anticipated Discharge Plan  Anticipated Discharge Disposition: home with home health  Type of Home Care Services: home OT, home PT, nursing      Patient Choice  Offered/Gave Vendor List: yes  Patient's Choice of Community Agency(s): [facility]    Patient and/or patient guardian/advocate was made aware of their right to choose a provider. A list of eligible providers was presented and reviewed with the patient and/or patient guardian/advocate in written and/or verbal form. The list delineates providers in the patient’s desired geographic area who are participating in the Medicare program and/or providers contracted with the patient’s primary insurance. The Medicare list and quality ratings were obtained from the Medicare.gov [medicare.gov] website.    ---------------------------------------------------------------------------------------------------------------------    Interdisciplinary Discharge Plan Review:  Participants:     Concerns Comments: Per DPR, patient is planned for discharge today, home with [facility].  Patient has transportation arranged, son will provide. Marko also getting the HHAs back in place. Both are agreeable to the dispo plan. CC will continue to follow for transition of care needs until discharge is complete.    Discharge Plan:   Disposition/Destination: Home Health Care - [facility] / Home  Discharge Facility:    Community Resources:      Discharge Transportation:  Is Out of Hospital DNR needed at Discharge:    Does patient need discharge transport?

## 2024-03-30 VITALS
WEIGHT: 156.1 LBS | TEMPERATURE: 98.6 F | HEIGHT: 73 IN | HEART RATE: 83 BPM | BODY MASS INDEX: 20.69 KG/M2 | DIASTOLIC BLOOD PRESSURE: 63 MMHG | SYSTOLIC BLOOD PRESSURE: 133 MMHG | RESPIRATION RATE: 18 BRPM | OXYGEN SATURATION: 93 %

## 2024-03-30 LAB
BASOPHILS # BLD: 0.03 K/UL (ref 0.01–0.1)
BASOPHILS NFR BLD: 0.6 %
DIFFERENTIAL METHOD BLD: ABNORMAL
EOSINOPHIL # BLD: 0.15 K/UL (ref 0.04–0.54)
EOSINOPHIL NFR BLD: 3 %
ERYTHROCYTE [DISTWIDTH] IN BLOOD BY AUTOMATED COUNT: 16.7 % (ref 11.6–14.4)
HCT VFR BLD AUTO: 31.7 % (ref 40.1–51)
HGB BLD-MCNC: 10.3 G/DL (ref 13.7–17.5)
IMM GRANULOCYTES # BLD AUTO: 0.03 K/UL (ref 0–0.08)
IMM GRANULOCYTES NFR BLD AUTO: 0.6 %
LYMPHOCYTES # BLD: 0.87 K/UL (ref 1.2–3.5)
LYMPHOCYTES NFR BLD: 17.4 %
MCH RBC QN AUTO: 32.9 PG (ref 28–33.2)
MCHC RBC AUTO-ENTMCNC: 32.5 G/DL (ref 32.2–36.5)
MCV RBC AUTO: 101.3 FL (ref 83–98)
MONOCYTES # BLD: 0.55 K/UL (ref 0.3–1)
MONOCYTES NFR BLD: 11 %
NEUTROPHILS # BLD: 3.37 K/UL (ref 1.7–7)
NEUTS SEG NFR BLD: 67.4 %
NRBC BLD-RTO: 0 %
PDW BLD AUTO: 11.1 FL (ref 9.4–12.4)
PLATELET # BLD AUTO: 123 K/UL (ref 150–350)
RBC # BLD AUTO: 3.13 M/UL (ref 4.5–5.8)
WBC # BLD AUTO: 5 K/UL (ref 3.8–10.5)

## 2024-03-30 PROCEDURE — 36415 COLL VENOUS BLD VENIPUNCTURE: CPT

## 2024-03-30 PROCEDURE — 63600000 HC DRUGS/DETAIL CODE: Mod: JZ | Performed by: STUDENT IN AN ORGANIZED HEALTH CARE EDUCATION/TRAINING PROGRAM

## 2024-03-30 PROCEDURE — 63700000 HC SELF-ADMINISTRABLE DRUG: Performed by: STUDENT IN AN ORGANIZED HEALTH CARE EDUCATION/TRAINING PROGRAM

## 2024-03-30 PROCEDURE — 63700000 HC SELF-ADMINISTRABLE DRUG

## 2024-03-30 PROCEDURE — 85025 COMPLETE CBC W/AUTO DIFF WBC: CPT

## 2024-03-30 PROCEDURE — 99238 HOSP IP/OBS DSCHRG MGMT 30/<: CPT | Performed by: INTERNAL MEDICINE

## 2024-03-30 RX ADMIN — THIAMINE HCL TAB 100 MG 50 MG: 100 TAB at 08:55

## 2024-03-30 RX ADMIN — GUAIFENESIN 600 MG: 600 TABLET ORAL at 08:56

## 2024-03-30 RX ADMIN — ESCITALOPRAM OXALATE 5 MG: 5 TABLET, FILM COATED ORAL at 08:55

## 2024-03-30 RX ADMIN — PANTOPRAZOLE SODIUM 20 MG: 20 TABLET, DELAYED RELEASE ORAL at 08:55

## 2024-03-30 RX ADMIN — CLOPIDOGREL 75 MG: 75 TABLET ORAL at 08:55

## 2024-03-30 RX ADMIN — Medication 500 MCG: at 08:55

## 2024-03-30 RX ADMIN — LEVOTHYROXINE SODIUM 112 MCG: 0.11 TABLET ORAL at 04:50

## 2024-03-30 RX ADMIN — FAMOTIDINE 10 MG: 20 TABLET, FILM COATED ORAL at 08:55

## 2024-03-30 RX ADMIN — FUROSEMIDE 40 MG: 10 INJECTION, SOLUTION INTRAMUSCULAR; INTRAVENOUS at 08:56

## 2024-03-30 RX ADMIN — Medication 500 MG: at 08:55

## 2024-03-30 ASSESSMENT — COGNITIVE AND FUNCTIONAL STATUS - GENERAL
STANDING UP FROM CHAIR USING ARMS: 3 - A LITTLE
MOVING TO AND FROM BED TO CHAIR: 3 - A LITTLE
CLIMB 3 TO 5 STEPS WITH RAILING: 3 - A LITTLE
WALKING IN HOSPITAL ROOM: 3 - A LITTLE

## 2024-03-30 NOTE — PLAN OF CARE
Pt had no acute events during shift.  Neuro: Continues at baseline.  Pain: No s/s or reports of acute pain requiring medication. Discomfort managed non-pharmaceutically.   CV: Continues at baseline  Respiratory: Pt continues at his baseline  GI:Pt continues at baseline  : Pt continues at baseline  Safety precautions remain in place per policy.  Will continue to monitor.     Problem: Adult Inpatient Plan of Care  Goal: Plan of Care Review  Outcome: Progressing  Goal: Patient-Specific Goal (Individualized)  Outcome: Progressing  Goal: Absence of Hospital-Acquired Illness or Injury  Outcome: Progressing  Goal: Optimal Comfort and Wellbeing  Outcome: Progressing  Goal: Readiness for Transition of Care  Outcome: Progressing     Problem: Swallowing Impairment  Goal: Optimal Eating and Swallowing Without Aspiration  Outcome: Progressing     Problem: Skin Injury Risk Increased  Goal: Skin Health and Integrity  Outcome: Progressing     Problem: Fall Injury Risk  Goal: Absence of Fall and Fall-Related Injury  Outcome: Progressing     Problem: Mobility Impairment  Goal: Optimal Mobility  Outcome: Progressing

## 2024-03-30 NOTE — PROGRESS NOTES
Hospital Medicine Service -  Daily Progress Note       SUBJECTIVE   Interval History: Px seen and examined at bedside , he had the thoracentesis done and 700ml removed     OBJECTIVE      Vital signs in last 24 hours:  Temp:  [36.4 °C (97.5 °F)-37 °C (98.6 °F)] 37 °C (98.6 °F)  Heart Rate:  [59-83] 83  Resp:  [18-20] 18  BP: ()/(31-63) 133/63    Intake/Output Summary (Last 24 hours) at 3/30/2024 1312  Last data filed at 3/30/2024 1230  Gross per 24 hour   Intake 420 ml   Output 1675 ml   Net -1255 ml       PHYSICAL EXAMINATION        Physical Exam  General: Elderly appearing, no acute distress, AAO x3  HEENT: Symmetric, PERRL/EOMI, moist membranes, NCAT  Cardiovascular: Regular rate and rhythm, normal S1/S2, no murmurs, rubs, gallops, no JVD  Pulmonary: Reduced breath sounds left anterior lung zone, no wheezing, rhonchi, rales, good effort  GI: Soft, nontender, nondistended, bowel sounds normal, has an ostomy bag with clean edges  Musculoskeletal: Normal bulk and tone, normal ROM  Extremities: Distal pulses intact, No LE edema bilaterally  Neuro: CN2-12 intact, sensation intact, wit no motor deficits  Psych: Normal mood and affect      LINES, CATHETERS, DRAINS, AIRWAYS, AND WOUNDS   Lines, Drains, and Airways:  Wounds (agree with documentation and present on admission):  Peripheral IV (Adult) 03/26/24 Anterior;Left Forearm (Active)   Number of days: 4       Colostomy LLQ (Active)   Number of days: 240       External Urinary Catheter Large (Active)   Number of days: 4         Comments:      LABS / IMAGING / TELE      Labs  Results from last 7 days   Lab Units 03/30/24  0459   WBC K/uL 5.00   HEMOGLOBIN g/dL 10.3*   HEMATOCRIT % 31.7*   PLATELETS K/uL 123*      Results from last 7 days   Lab Units 03/29/24  1246   SODIUM mEQ/L 139   POTASSIUM mEQ/L 3.3*   CHLORIDE mEQ/L 105   CO2 mEQ/L 27   BUN mg/dL 30*   CREATININE mg/dL 1.4*   GLUCOSE mg/dL 99   CALCIUM mg/dL 8.1*          Imaging  IR  THORACENTESIS    Result Date: 3/29/2024  Narrative: CLINICAL HISTORY: shortness of breath. COMMENT: The patient was referred for therapeutic ultrasound-guided repeat thoracentesis on the left side.  Informed consent was obtained.  With the patient sitting, ultrasound imaging was performed and a large size pleural effusion was identified.  The fluid was localized and a site was selected on the skin with ultrasound.  The site was marked.  Using sterile technique, under local anesthesia, a 4-Maltese valved Yueh catheter was inserted into the pleural space. 700 mL of clear yellow pleural fluid were removed. The catheter was removed and a sterile dressing was placed over the catheter insertion site. A moderate size pleural effusion remained post procedure.  This procedure was discontinued prior to complete evacuation of the effusion because the patient requested the procedure be aborted, as he was experiencing shortness of breath and coughing.  The fluid was discarded. An erect frontal chest film was obtained immediately following the procedure and will be separately reported. This procedure was performed by Betsy Sandoval PA-C under the supervision of Gigi Berry M.D..     Impression: IMPRESSION: Successful ultrasound-guided left thoracentesis with 700 mL pleural fluid removed and discarded. I certify that I have personally reviewed this examination and agree with Betsy Sandoval PA-C's report. Gigi Berry M.D.    X-RAY CHEST 1 VIEW    Result Date: 3/29/2024  Narrative: CLINICAL HISTORY:   Status post left-sided thoracentesis. COMMENT: Comparison:   Chest radiographs dated 3/28/2024.P Single frontal AP view of the chest was obtained. The cardiac silhouette and mediastinal contour are stable status post median sternotomy and CABG demonstrating cardiomegaly. There is mild interval improvement in left pleural-parenchymal disease with persistent effusion. There is unchanged mild pulmonary vascular congestion. No evidence  of a thorax.     Impression: IMPRESSION: Findings as described above.     IR THORACENTESIS    Result Date: 3/28/2024  Narrative: CLINICAL HISTORY: shortness of breath. COMMENT: The patient was referred for ultrasound-guided thoracentesis on the left side.  Informed consent was obtained.  With the patient sitting, ultrasound imaging was performed and a large size pleural effusion was identified.  The fluid was localized and a site was selected on the skin with ultrasound.  The site was marked.  Using sterile technique, under local anesthesia, a 4-British valved Yueh catheter was inserted into the pleural space. 1.3 L of clear yellow pleural fluid were removed. The catheter was removed and a sterile dressing was placed over the catheter insertion site. A moderate pleural effusion remained post procedure.  This procedure was discontinued prior to complete evacuation of the effusion because the maximum volume of fluid was removed.  The fluid was sent to the lab for analysis.  The patient tolerated the procedure well. An erect frontal chest film was obtained immediately following the procedure and will be separately reported. This procedure was performed by Betsy Sandoval PA-C under the supervision of Gigi Berry M.D..     Impression: IMPRESSION: Successful ultrasound-guided left thoracentesis with 1.3 L pleural fluid removed with specimens sent to the lab for analysis.  All components of the time-out, debriefing, and handling of the specimen were conducted as per the ASAP Specimen Protocol. I certify that I have personally reviewed this examination and agree with Betsy Sandoval PA-C's report. Gigi Berry M.D.    X-RAY CHEST 1 VIEW    Result Date: 3/28/2024  Narrative: CLINICAL HISTORY: s/p L thoracentesis COMMENT: Frontal view of the chest with comparison chest radiograph from the previous day. Decreased left pleural effusion. No pneumothorax. Unchanged cardiomegaly. Stable sternal wires. Improved aeration at the  right lung base. Small right pleural effusion.     Impression: IMPRESSION: No evidence of pneumothorax status post left thoracentesis.    Transthoracic Echo (TTE) Limited    Result Date: 3/28/2024  Narrative: Technically difficult study. EKG demonstrates sinus rhythm. 1. The left ventricular cavity is small in size with normal wall thickness and mildly reduced systolic function. Left ventricular ejection fraction is 45-50% by visual estimate. Low stroke volume. Abnormal septal motion due to RV compression. Diastolic function not assessed on this limited study. 2. The aortic valve is sclerotic and not well visualized, likely three-cusped. No aortic stenosis. No aortic regurgitation. 3. Normal sized aortic root. There is aortic root calcification. Normal sized ascending aorta. 4. Thickened mitral leaflets. Mild mitral annular calcification. Trace mitral regurgitation. 5. The left atrium is normal in size. 6. The right ventricle is severely dilated (indexed volume 106 mL/m^2) and apex forming. Right ventricular systolic function is severely reduced. Apical hypercontractility (Nunes's sign) present. 7. The right atrium is massively dilated. 8. There is a large coaptation gap of the tricuspid leaflets. Wide-open tricuspid regurgitation with single chamber physiology. Unable to estimated right ventricular pressure due to severity of tricuspid regurgitation. 9. The pulmonic valve is not well visualized. Trace pulmonic regurgitation. 10. The IVC is enlarged with less than 50% respiratory variation consistent with elevated right-sided filling pressures. 11. No pericardial effusion. Large left pleural effusion. Compared to previous TTE from 8/4/2023, there is no significant change.     X-RAY CHEST 2 VIEWS    Result Date: 3/27/2024  Narrative: CLINICAL HISTORY:   assess effusion post diuresis COMMENT: VIEWS: two - frontal and lateral. Comparison date: March 25, 2024. The heart and mediastinal contours are unchanged. Large  left pleural effusion with volume loss is seen. Small right pleural effusion is seen with basilar airspace opacity. Upper lung zones are unchanged. Bony thorax is stable.     Impression: IMPRESSION: No interval change in large left pleural effusion and small right pleural effusion    X-RAY CHEST 1 VIEW    Result Date: 3/26/2024  Narrative: CLINICAL HISTORY: sob COMMENT: Frontal view of the chest obtained and compared with 6/20/2023. LINES/TUBES: Sternotomy wires, some of which appear broken. Clips. OTHER: Worsening large left and moderate right pleural effusions with associated atelectasis and pulmonary edema. No definite pneumothorax. Stable cardiomediastinal silhouette.     Impression: IMPRESSION: Worsening CHF. Recommend follow-up radiographs or chest CT in 6-8 weeks.        ASSESSMENT AND PLAN      * Acute congestive heart failure, unspecified heart failure type (CMS/HCC)  Assessment & Plan  Presentation: SOLIS, fatigue, abd swelling, and elevated BNP  Last TTE (8/2023): Estimated EF 45 to 50 %. D shaped left ventricular cavity in the setting of severe RVoverload Unable to assess diastolic function due to atrial arrhythmia. The right ventricular cavity is severely dilated with severely decreased systolic function. Nunes's sign is present. Unchanged from prior one from 8/2022.    Cardiologist: Dr. Jeffrey.   Home diuretic: Torsemide 10 mg PO BID  Other GDMT: Metoprolol (specify home dose seeing fills for succinate and tartrate), history of Justin's gangrene with SGLT2i use. Entresto last filled 5/30/2023 (seems to be on ARB alone, likely due to hypotension)    PLAN:  -Mild troponin elevation likely in the setting of demand ischemia due to heart failure exacerbation. STAT EKG and trops for chest pain   -Prior TTE was 7 months ago.  Repeat TTE with unchanged EF, again shows severely dilated RV with decreased RV systolic function (Nunes sign).  This is unchanged from prior echocardiogram. Clinically, he  "does not present with symptoms of PA such as tachycardia, chest pain, S1Q3T3 on EKG.  History without recent travel, no leg pain, non-smoker.  As such, no indication to check a CTA PE protocol at this time.      -PT/OT cleared for home with home care. Has 24-hour care at home at baseline  -ResumeTorsemide 10mg BID with daily weight monitoring     Hematuria  Assessment & Plan  Nursing reports scant hematuria and small amount of florencio blood from the urethral meatus even without active urination.  No history of this according to the patient.  No Will catheters this admission, he has historically had a Will catheter, most recently a few months ago he says.    -Exam without obvious signs of trauma on the external urethral meatus.  -Patient reports has had some trouble repositioning the plastic urinal, may have \"nicked\" the urethral meatus perhaps    -Okay to continue antiplatelet therapy and DVT prophylaxis  -UA on admission without signs of UTI  -Referral to urology for hematuria workup if aligns with goals of care (89 years) and if this is recurrent  -Hgb stable. No further c/o this on day of dc. UA with no bacteria. Dc abx (post 3 days rocephen)    Multiple medical problems  Assessment & Plan  #hypothyroidism  Previously maintained on 137 mcg levothyroxine, TSH 0.02 in 11/2023, dose reduced 112 mcg.  TSH 0.03 on this admission, reduce dose to 100 mcg and recommend follow-up labs in 4 to 6 weeks with PCP    #depression  -continue home SSRI (escitalopram)    #bacteruria   -Reportedly diagnosed with UTI outpatient but not yet prescribed abx?   -UA unremarkable here on admission  -treated emprically for PNA with Rocephen which would cover common urinary bacteria   -monitor for sx outpt. Continue hiprex.       Pleural effusion  Assessment & Plan  IR thoracentesis, both diagnostic and therapeutic 3/28 and 3/29 seeing high volume   -CXR much improved post thora  -Lytes criteria transudative, cx pending     Coronary artery " disease  Assessment & Plan  CAD post CABG in 1996    -Continue home plavix   -Presentation inconsistent with ACS, trop elevation presumbaly in the setting of ADHF      Atrial fibrillation (CMS/[facility])  Assessment & Plan  Continue metoprolol  He is off any anticoagulation due to his GI bleed   Did not want to pursue NISH closure device in the past and is rate controlled now          VTE Assessment: Padua    VTE Prophylaxis:  Current anticoagulants:  [Provider Managed Hold] enoxaparin (LOVENOX) syringe 40 mg, subcutaneous, Daily (6p)      Code Status: DNR (A.N.D.)      Estimated Discharge Date: 3/29/2024     Disposition Planning: D/C Home tomorrow     Abdi Moses MD  3/30/2024

## 2024-04-01 ENCOUNTER — PATIENT OUTREACH (OUTPATIENT)
Dept: CASE MANAGEMENT | Facility: CLINIC | Age: 89
End: 2024-04-01
Payer: MEDICARE

## 2024-04-01 LAB
CASE RPRT: NORMAL
GRAM STN SPEC: NORMAL
GRAM STN SPEC: NORMAL
MICROORGANISM SPEC CULT: NORMAL
PATH REPORT.FINAL DX SPEC: NORMAL
PATH REPORT.GROSS SPEC: NORMAL

## 2024-04-01 ASSESSMENT — ENCOUNTER SYMPTOMS
ALLERGIC/IMMUNOLOGIC NEGATIVE: 1
GASTROINTESTINAL NEGATIVE: 1
TROUBLE SWALLOWING: 1
HEMATOLOGIC/LYMPHATIC NEGATIVE: 1
WEAKNESS: 1
COUGH: 1
ENDOCRINE NEGATIVE: 1
PSYCHIATRIC NEGATIVE: 1
BACK PAIN: 1
FATIGUE: 1

## 2024-04-01 NOTE — PROGRESS NOTES
"  NAME: Samy Elena    MRN: [id number]    YOB: 1930    Event Review:    Initial TCM Patient Outreach Date: 04/01/24    Assessment completed with: Paid Caregiver  Patient stated reason for hospitalization: shortness of breath, fluid buildup coughing, fever  Discharge Diagnosis: Acute congestive heart failure    Patient readmitted in the last 30 days: No  Discharging Facility: [facility]  Date of Last Admission: 03/25/24  Date of Last Discharge: 03/30/24    Patient's perception of their health status since discharge: Same    HPI:  Patient as taken via EMS to [facility] on 3/25/24 for generalized weakness, fatigue and shortness of breath. Had been diagnosed that day with a UTI but had not started any antibiotics as yet. His chest x-ray showed worsening CHF, with large left and right pleural effusions with associated atelectasis and pulmonary edema. Pt was admitted for acute congestive heart failure, unspecified heart failure type. Diuresed and seen by multiple specialties including speech and language to rule out aspiration. Jl complete and therapist cleared for regular diet with thins but did recommend video swallow, which she said pt \"politely declined.\" His HHA did note to speech therapist that \"sometimes he chokes on food.\" Pt had two thoracocentesis procedures 3/28 and 3/29/24 1.3 liters and 700 ml respectively were removed from the left. Pt was deemed safe to discharge to home with 24/7 caregiver and services of [facility] on 3/30/24.    Home is a 2 story house with a ramp to enter and a first floor set up. Pt has a hospital bed for sleep, uses a rolling walker for ambulation which requires close supervision as pt has a tendency to lean back. Pt gets bed baths from live in care giver. He has a colostomy and wears a brief but is at times able to use the urinal.     Son Marko participated with the medication review and is well versed with patients medications but " "deferred the rest of the assessment to the live in care giver Melinda who he gave ACM permission to speak to as Melinda is not on the HIPPA section of the chart. Called the home and got in touch with Melinda who has been the pt's caregiver for some time and knows the gentleman well. She was able to help with the assessment. Melinda is concerned as pt often drinks \"too fast\" and will not slow down. ACM suggested smaller amounts in the glass and Melinda said she had tried that, he just finishes quicker and wants more. She is concerned about aspiration.   Additionally ACM mentioned that [facility] would be in touch to start soon. Melinda is worried because last time PT/OT worked on stairs with pt, which he then tried to do without PT. She asked if that was something they could not do this time as pt has a first floor set up and no need to go upstairs. She is worried it is dangerous for him. AC sent secure message to AIM RN with CG request.     Hospital to Home Program explained to both son and CG. Phone number given to both for contact to [facility].     ACM will follow up on or around 4/8/24.    CRNP will contact son Marko 4/3/24 at 11:00 am, he is aware.       Review of Systems   Constitutional:  Positive for fatigue.   HENT:  Positive for trouble swallowing (liquids mainly/drinks quickly).    Respiratory:  Positive for cough (mucus).    Gastrointestinal: Negative.    Endocrine: Negative.    Genitourinary: Negative.    Musculoskeletal:  Positive for back pain and gait problem.   Skin: Negative.    Allergic/Immunologic: Negative.    Neurological:  Positive for weakness.   Hematological: Negative.    Psychiatric/Behavioral: Negative.         Medication Review:  Medication Review: Yes  Reported by: Child  Any new medications prescribed at discharge?: Yes  Is the patient having any side effects they believe may be caused by any medication additions or changes?: No  New prescriptions filled?: Yes  Do you have enough of your regularly prescribed " medications?: Yes    Medication adherence problem?: No  Was a medication discrepancy indentified?: No    Nursing Interventions: No intervention needed  Reconciled the current and discharge medications: Yes  Reviewed AVS (Discharge Instructions)?: Yes    Acute Pain:  Acute pain: No  Chronic Pain:  Chronic pain: No  Diet/Nutrition:  Type of Diet: Regular, Cardiac 2mg sodium, Fluid Restricted (1500 ml)  Diet Adherence: Adherent with diet  Home Care Services:  Home Care Agency: [facility]  Type of Home Care Services: Home PT, Home OT, Home Nursing  Home Care Interventions: No intervention required   Post-Discharge Durable Medical Equipment::  Durable Medical Equipment: Front wheeled walker, Hospital bed, Ramp, Shower/tub seat  Does patient's condition require a scale?: Yes  Working scale at home?: Yes  Oxygen Use: No  Has all Durable Medical Equipment (DME) been delivered?: Other (N/A)  DME Interventions: No intervention required  Home Management:  Living Arrangement: Paid Caregiver (24/7 care)  Support System:: Paid Caregiver  Type of Residence: 2 story house (first floor set up)  Home Monitoring: Weight  Any patient reported falls in the last 3 months?: No  Quaker or spiritual beliefs that impact treatment?: No  Appointment Scheduling:  PCP appointment scheduled: No    Patient Scheduling Dispositions: Patient requested to schedule their own appointment (physician makes house calls and HHA to call today)   Follow-Ups:    Relevant Specialist Follow-ups: IR Thoracentesis 4/12/24, IR follow up 4/13/24  Interventions/ Care Coordination:  Interventions/ Care Coordination: Encouraged patient to schedule with the PCP/Specialist  General Education: Skin integrity precautions, Infection prevention instructions, Nutrition and hydration instructions  Disease Specific Education: CHF  Initiated Care Plan: CHF    Reviewed signs/symptoms of worsening condition or complication that necessitate a call to the Physician's office.  Educated  "patient on access to care.  RN phone number given for future care management.    Jenelle Vences MSN, RN-BC, CHPN, CM  [facility]  [phone]      [facility] call placed to kaykay Zhu's number  [phone]  due to CRNP's concern with discharge note stating \"Torsemide 10 mg twice a day\" and the AVS stating Torsemide 10 mg once daily.     ACM wants to complete assessment but also wants to ensure that proper medication is being    administered as the discharging physician would like it to be.     Message left with [facility] contact number for return call as well as reminder for CRNP call Wednesday 4/3/24 at 11:00 am.       "

## 2024-04-03 ENCOUNTER — TELEMEDICINE (OUTPATIENT)
Dept: HOSPITALIST | Facility: CLINIC | Age: 89
End: 2024-04-03
Payer: MEDICARE

## 2024-04-03 ENCOUNTER — LAB REQUISITION (OUTPATIENT)
Dept: LAB | Facility: HOSPITAL | Age: 89
End: 2024-04-03
Attending: INTERNAL MEDICINE
Payer: MEDICARE

## 2024-04-03 DIAGNOSIS — I48.0 PAF (PAROXYSMAL ATRIAL FIBRILLATION) (CMS/HCC): ICD-10-CM

## 2024-04-03 DIAGNOSIS — J90 PLEURAL EFFUSION: ICD-10-CM

## 2024-04-03 DIAGNOSIS — N39.0 URINARY TRACT INFECTION WITHOUT HEMATURIA, SITE UNSPECIFIED: Primary | ICD-10-CM

## 2024-04-03 DIAGNOSIS — N39.0 URINARY TRACT INFECTION, SITE NOT SPECIFIED: ICD-10-CM

## 2024-04-03 DIAGNOSIS — I10 PRIMARY HYPERTENSION: ICD-10-CM

## 2024-04-03 DIAGNOSIS — I50.811 ACUTE RIGHT-SIDED CHF (CONGESTIVE HEART FAILURE) (CMS/HCC): ICD-10-CM

## 2024-04-03 DIAGNOSIS — R30.0 DYSURIA: ICD-10-CM

## 2024-04-03 DIAGNOSIS — I36.1 NONRHEUMATIC TRICUSPID VALVE REGURGITATION: Primary | ICD-10-CM

## 2024-04-03 LAB
BACTERIA URNS QL MICRO: ABNORMAL /HPF
BILIRUB UR QL STRIP.AUTO: NEGATIVE MG/DL
CLARITY UR REFRACT.AUTO: ABNORMAL
COLOR UR AUTO: YELLOW
GLUCOSE UR STRIP.AUTO-MCNC: NEGATIVE MG/DL
HGB UR QL STRIP.AUTO: ABNORMAL
HYALINE CASTS #/AREA URNS LPF: ABNORMAL /LPF
KETONES UR STRIP.AUTO-MCNC: NEGATIVE MG/DL
LEUKOCYTE ESTERASE UR QL STRIP.AUTO: 3
NITRITE UR QL STRIP.AUTO: NEGATIVE
PH UR STRIP.AUTO: 5.5 [PH]
PROT UR QL STRIP.AUTO: 1
RBC #/AREA URNS HPF: ABNORMAL /HPF
SP GR UR REFRACT.AUTO: 1.01
SQUAMOUS URNS QL MICRO: ABNORMAL /HPF
UROBILINOGEN UR STRIP-ACNC: 0.2 EU/DL
WBC #/AREA URNS HPF: ABNORMAL /HPF

## 2024-04-03 PROCEDURE — 87086 URINE CULTURE/COLONY COUNT: CPT | Performed by: INTERNAL MEDICINE

## 2024-04-03 PROCEDURE — 81001 URINALYSIS AUTO W/SCOPE: CPT | Performed by: INTERNAL MEDICINE

## 2024-04-03 NOTE — PATIENT INSTRUCTIONS
Continue meds as directed    Daily weights, weight on  lbs    Take an additional dose of Torsemide with any 2 lbs or at the ONSET of any CHF symptoms. Contact Cardiology if symptoms do not improve with additional Torsemide dose    [facility] following, requested the draw UA C&S with results to PCP    Repeat R thoracentesis on 4/12 with IR follow up on 4/13    Cardiology appt 5/21

## 2024-04-03 NOTE — PROGRESS NOTES
Hospital to Home Program Visit       Hospital to Home Program Visit:  Audio Only Encounter       DISCHARGE INFORMATION   Assessment completed with: Paid Caregiver  Discharge Diagnosis: CHF  Discharging Facility: [facility]  Date of Last Discharge: 03/29/24  Discharge Disposition: Home  Patient's perception of their health status since discharge: New symptoms unrealted to diagnosis (concern for repeat UTI)    PCP: Zachery Hernandez MD       HPI / OVERVIEW OF VISIT      Samy Elena is a chronically ill  89 y.o. male with a PMH of Afib not on AC and has refused NISH closure, CAD with CABG 1996, severe TR, R CHF, frequent UTI,  hypothyroid, rectal bleed bleed. He lives at home with the help of HHA and near by family. Patient presented to [facility] 3/25 with change in mental status and SOB. Caretaker reported he recently completed antibx for UTI and increased his oral intake at recommendation as OP providers. In the ER, patient noted to be hypoxic. CXR showed large L and moderate R pleural effusions. Echo showed EF 45-50% with severely dilated RV with decreased RV function (+McConnells sign). CT negative for PE. Started on IV diuresis, underwent multiple thoracentesis, with total of 2L removed from left lung, did not tolerate thoracentesis from R). Stay complicated by TSH 0.03 and Synthroid dose adjusted. Started on short course of Rocephin given hematuria, DC after UA negative. DC to home on adjusted Synthroid dose, Torsemide dose unchanged.        A tele health visit was completed today with caretaker Melinda providing the bulk of the information. Reviewed the signs and symptoms of CHF, all of which he currently denies. Weight stable at 156 lbs. Aware of the need for low sodium, 2L FR diet and close monitoring of daily weights. Well versed on when to take an additional dose of Torsemide. Caretaker concerned that he may have a recurrent UTI, scant hematuria and mild confusion at night. I have added  UA C&S and requested collection by [facility].        DC MAR reviewed. Required no med renewals or lab draws. Has upcoming repeat R thoracentesis planned for 4/12, with IR follow up on 4/13.       What would cause you to go back to the hospital?      Planned thoracentesis on 4/12    Who would you call if you had a problem?     Contact cardiology if weight of CHF symptoms do not respond to additional Torsemide dose    Overall, how was your care that was provided and do you have any suggestions for improvement?     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   • Necrotizing soft tissue infection   • Shock (CMS/Formerly [facility])   • Palliative care by specialist   • Debility   • Hyperkalemia   • Cellulitis and necrosis of penis   • Parastomal hernia   • Acute on chronic systolic (congestive) heart failure (CMS/Formerly [facility])   • Fall   • Coronary artery disease   • Respiratory failure with hypoxia (CMS/Formerly [facility])   • CALI (acute kidney injury) (CMS/Formerly [facility])   • 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Formerly [facility])   • Hypertension   • Pleural effusion   • Atrial fibrillation (CMS/Formerly [facility])   • Elevated troponin   • UTI (urinary tract infection)   • Orthostatic hypotension   • Atrial fibrillation (CMS/HCC)   • Hyperlipidemia   • Hypothyroidism   • Depression   • Cough   • UTI (urinary tract infection)   • CKD (chronic kidney disease) stage 3, GFR 30-59 ml/min (CMS/[facility]ly [facility])   • Pressure ulcer   • 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[facility])   • Acute congestive heart failure, unspecified heart failure type (CMS/[facility]ly [facility])   • Multiple medical problems   • Hematuria         MEDICATIONS      Reconciled the current and discharge medications: Yes      Current Outpatient Medications:   •  bimatoprost  (LUMIGAN) 0.01 % ophthalmic drops, Administer 1 drop into the left eye nightly., Disp: , Rfl:   •  calcium carbonate (TUMS) 200 mg calcium (500 mg) chewable tablet, Take 1 tablet by mouth 2 (two) times a day., Disp: , Rfl:   •  clopidogreL (PLAVIX) 75 mg tablet, Take 75 mg by mouth every morning., Disp: , Rfl:   •  cyanocobalamin (VITAMIN B12) 500 mcg tablet, Take 500 mcg by mouth every morning., Disp: , Rfl:   •  escitalopram (LEXAPRO) 5 mg tablet, Take 5 mg by mouth daily., Disp: , Rfl:   •  famotidine (PEPCID) 10 mg tablet, Take 1 tablet (10 mg total) by mouth daily Indications: gastroesophageal reflux disease., Disp: 30 tablet, Rfl: 0  •  lidocaine (ASPERCREME) 4 % adhesive patch,medicated topical patch, Apply 1 patch topically daily as needed. Remove & discard patch within 12 hours or as directed by prescriber., Disp: , Rfl:   •  loteprednol etabonate (LOTEMAX) 0.5 % ointment, Apply 1 Application to left eye 3 (three) times a day., Disp: , Rfl:   •  magnesium oxide (MAG-OX) 400 mg (241.3 mg magnesium) tablet, Take 200 mg by mouth 4 (four) times a day. Difficult to swallow at 400 mg, so son found 200 mg. So taking 4 times a day., Disp: , Rfl:   •  melatonin 3 mg tablet, Take 3 mg by mouth nightly., Disp: , Rfl:   •  methenamine (HIPREX) 1 gram tablet, Take 1 g by mouth daily., Disp: , Rfl:   •  metoprolol succinate XL (TOPROL-XL) 25 mg 24 hr tablet, Take 0.5 tablets (12.5 mg total) by mouth daily., Disp: 15 tablet, Rfl: 0  •  omeprazole OTC (PriLOSEC OTC) 20 mg EC tablet, Take 20 mg by mouth daily before breakfast., Disp: , Rfl:   •  potassium chloride (KLOR-CON) 10 mEq CR tablet, Take 20 mEq by mouth 2 (two) times a day., Disp: , Rfl:   •  SYNTHROID 100 mcg tablet, Take 1 tablet (100 mcg total) by mouth daily., Disp: 30 tablet, Rfl: 0  •  thiamine 50 mg tablet, Take 50 mg by mouth daily., Disp: , Rfl:   •  torsemide (DEMADEX) 10 mg tablet, Take 10 mg by mouth daily., Disp: , Rfl:     I am having Samy MARTINEZ  Kassy maintain his clopidogreL, potassium chloride, magnesium oxide, calcium carbonate, cyanocobalamin, omeprazole OTC, melatonin, thiamine, LOTEMAX, LUMIGAN, famotidine, escitalopram, torsemide, methenamine, lidocaine, metoprolol succinate XL, and SYNTHROID.    REVIEW OF SYSTEMS      All other systems reviewed and negative except as noted in HPI.      POST DISCHARGE MEDICAL EQUIPMENT      DME Interventions: No intervention required  Oxygen Use: No     Has all Durable Medical Equipment (DME) been delivered?: Other (NA)    FOLLOW-UP APPOINTMENTS      Appointment Provider: repeat R thoracentesis on 4/12, follow up with IR 4/13       INTERVENTIONS   Interventions needed: updated medication listing, ordered labs, communicated with another interdisciplinary provider            PATIENT INSTRUCTIONS      Patient Instructions   Continue meds as directed    Daily weights, weight on  lbs    Take an additional dose of Torsemide with any 2 lbs or at the ONSET of any CHF symptoms. Contact Cardiology if symptoms do not improve with additional Torsemide dose    [facility] following, requested the draw UA C&S with results to PCP    Repeat R thoracentesis on 4/12 with IR follow up on 4/13    Cardiology appt 5/21             HEIDI Stone  4/3/2024

## 2024-04-05 LAB
BACTERIA UR CULT: NORMAL
BACTERIA UR CULT: NORMAL

## 2024-04-08 ENCOUNTER — PATIENT OUTREACH (OUTPATIENT)
Dept: CASE MANAGEMENT | Facility: CLINIC | Age: 89
End: 2024-04-08
Payer: MEDICARE

## 2024-04-08 NOTE — PROGRESS NOTES
"H call placed to preferred number and reached caregiver Melinda at [phone]   And pt who was in the room too. \"I feel fine\" was pt's response. Caregiver reports that pt's weight today is 155. He has some abdominal swelling which she does point out to HH RN. He continues to drink way too quickly which she has also informed the HH RN. He had Speech therapy previously but it was not effective in teaching him to slow down. Caregiver says she gives him small amounts at a time to try to diminish his ability to take too much too quickly.  Reinforced CHF teaching about 2 liters fluid restriction. CG repeated back all of the CHF protocol. She is well versed in his care.    Reviewed CHF Care plan with caregiver too.         Care Plan: CHF Template   Updates made by Jenelle Vences RN since 4/8/2024 12:00 AM        Problem: CHF Diagnosis Knowledge Deficit         Goal: Patient will verbalize understanding of how heart failure affects the body          Task: Educate patient on the mechanics of heart failure Completed 4/8/2024        Task: Educate patient on CHF diagnosis Completed 4/8/2024        Goal: Patient will verbalize understanding of impact of co-morbidities related to CHF         Task: Educate patient on the impacts of co-morbidities related to CHF Completed 4/8/2024        Task: Assess patient co-morbidities associated with CHF Completed 4/8/2024        Problem: Heart Failure Progression and Care Needs         Goal: Patient will verbalize understanding of CHF progression and care needs         Task: Educate patient on stages of Heart Failure Completed 4/8/2024        Task: Educate patients regarding symptoms with End Stage Heart Failure Completed 4/8/2024        Task: Educate patient on signs and symptoms of CHF complcations to report to clinician Completed 4/8/2024   Responsible User: Jenelle Vences, RN        Goal: Patient is adherent to regular PCP/Cardiology appointments         Task: Educate patient " on importance of regular PCP/Cardiology appointments Completed 4/8/2024        Problem: Medication Adherence         Goal: Patient verbalizes use benefits and side effects of medications         Task: Educate patient on the details of prescribed medications including use, benefits, and side effects Completed 4/8/2024        Goal: Patient demonstrates adherence to medication regime and supply         Task: Assess patient for medication adherence Completed 4/8/2024        Problem: Daily Weights         Goal: Patient is adherent to daily weight measurement, keeping a daily weight log.         Task: Confirm patient has a working scale in the home Completed 4/8/2024        Task: Educate patient on daily weight measurement including early morning time, after voiding, before dressing and eating Completed 4/8/2024        Task: Instruct patient on specific weight parameters and when to contact physician Completed 4/8/2024        Problem: Diet/Fluid Intake         Goal: Patient verbalizes understanding of diet and fluid restriction orders         Task: Educate patient regarding low sodium diet Completed 4/8/2024        Task: Educate patient on fluid restriction Completed 4/8/2024        Problem: Edema Management         Goal: Patient will employ strategies to manage edema         Task: Educate patient on signs of extremity edema Completed 4/8/2024        Task: Educate patient on the importance of daily self-check for symptoms of extra fluid (shortness of breath, weakness, trouble sleeping, body swelling, or dizziness) Completed 4/8/2024        Task: Instruct patient how to elevate lower extremities above heart level to decrease edema Completed 4/8/2024        Task: Instruct patient on how to perform ankle pump exercises to improve circulation and decrease edema Completed 4/8/2024        Problem: Physical Actvitiy and Pacing Strategies         Goal: Patient demonstrates understanding of lifestyle changes         Task: Educate  patient on the benefits of lifestyle changes including physical activity in relationship to CHF Completed 4/8/2024        Task: Instruct patient on pacing activity as tolerated with adequate rest periods Completed 4/8/2024        Task: Assess patient's adherence with physical therapy exercises between visits Completed 4/8/2024          ACM will call back on or around 4/15/24.    Jenelle Vences MSN, RN-BC, CHPN, CM  [facility]  [phone]

## 2024-04-15 ENCOUNTER — PATIENT OUTREACH (OUTPATIENT)
Dept: CASE MANAGEMENT | Facility: CLINIC | Age: 89
End: 2024-04-15
Payer: MEDICARE

## 2024-04-15 NOTE — PROGRESS NOTES
[facility] call placed to pt on [phone] message left with ACM contact information for return call.   If no response then ACM will follow up on or around 4/22/24.    Jenelle Vences MSN, RN-BC, CHPN, CM  [facility]  [phone]

## 2024-04-22 ENCOUNTER — PATIENT OUTREACH (OUTPATIENT)
Dept: CASE MANAGEMENT | Facility: CLINIC | Age: 89
End: 2024-04-22
Payer: MEDICARE

## 2024-04-22 NOTE — PROGRESS NOTES
[facility] call placed to preferred number. Message left with ACM call back number for return call. If no response then ACM will call back on or around 4/29/24.    Jenelle Vences MSN, RN-BC, CHPN, CM  [facility]  [phone]

## 2024-04-25 LAB — FUNGUS SPEC CULT: NORMAL

## 2024-04-29 ENCOUNTER — PATIENT OUTREACH (OUTPATIENT)
Dept: CASE MANAGEMENT | Facility: CLINIC | Age: 89
End: 2024-04-29
Payer: MEDICARE

## 2024-04-29 NOTE — PROGRESS NOTES
[facility] call placed to patient on [phone] reached him and informed him that today would be our final call. He sang the praises of David his PT and how well they worked together. Unfortunately for the pt he has been discharged from PT but the therapist is working on getting some services for the pt through part B. Pt is anxiously waiting to hear more on that front.  He really wants to continue this journey to get even stronger.     He has maintained his cardiac diet even seeing the dietician from [facility] yesterday for reinforcement. Was able to teach ACM some salt free options after his meeting with RD.     Looking forward to the better weather and going on more outings with his sons.  Pt is in a great mood and very thankful to [facility].     Closed to care management. Pt equipped to manage care. Pt instructed to reach out to PCP/specialist for concerns.    Jenelle Vences MSN, RN-BC, CHPN, CM  [facility]  [phone]

## 2024-05-03 ENCOUNTER — TRANSCRIBE ORDERS (OUTPATIENT)
Dept: LAB | Facility: HOSPITAL | Age: 89
End: 2024-05-03

## 2024-05-03 ENCOUNTER — APPOINTMENT (OUTPATIENT)
Dept: LAB | Facility: HOSPITAL | Age: 89
End: 2024-05-03
Attending: PHYSICIAN ASSISTANT
Payer: MEDICARE

## 2024-05-03 ENCOUNTER — HOSPITAL ENCOUNTER (OUTPATIENT)
Dept: RADIOLOGY | Facility: HOSPITAL | Age: 89
Discharge: HOME | End: 2024-05-03
Attending: INTERNAL MEDICINE | Admitting: RADIOLOGY
Payer: MEDICARE

## 2024-05-03 ENCOUNTER — APPOINTMENT (OUTPATIENT)
Dept: RADIOLOGY | Facility: HOSPITAL | Age: 89
End: 2024-05-03
Attending: PHYSICIAN ASSISTANT
Payer: MEDICARE

## 2024-05-03 VITALS
RESPIRATION RATE: 16 BRPM | SYSTOLIC BLOOD PRESSURE: 108 MMHG | DIASTOLIC BLOOD PRESSURE: 60 MMHG | OXYGEN SATURATION: 98 % | HEART RATE: 74 BPM

## 2024-05-03 DIAGNOSIS — J90 PLEURAL EFFUSION: ICD-10-CM

## 2024-05-03 DIAGNOSIS — I48.20 CHRONIC ATRIAL FIBRILLATION, UNSPECIFIED (CMS/HCC): Primary | ICD-10-CM

## 2024-05-03 DIAGNOSIS — I48.20 CHRONIC ATRIAL FIBRILLATION, UNSPECIFIED (CMS/HCC): ICD-10-CM

## 2024-05-03 LAB
ANION GAP SERPL CALC-SCNC: 8 MEQ/L (ref 3–15)
BUN SERPL-MCNC: 33 MG/DL (ref 7–25)
CALCIUM SERPL-MCNC: 8.6 MG/DL (ref 8.6–10.3)
CHLORIDE SERPL-SCNC: 108 MEQ/L (ref 98–107)
CO2 SERPL-SCNC: 26 MEQ/L (ref 21–31)
CREAT SERPL-MCNC: 1.7 MG/DL (ref 0.7–1.3)
EGFRCR SERPLBLD CKD-EPI 2021: 37.4 ML/MIN/1.73M*2
GLUCOSE SERPL-MCNC: 158 MG/DL (ref 70–99)
INR PPP: 1.6
POTASSIUM SERPL-SCNC: 4.1 MEQ/L (ref 3.5–5.1)
PROTHROMBIN TIME: 18.6 SEC (ref 12.2–14.5)
SODIUM SERPL-SCNC: 142 MEQ/L (ref 136–145)
TSH SERPL DL<=0.05 MIU/L-ACNC: 0.08 MIU/L (ref 0.34–5.6)

## 2024-05-03 PROCEDURE — 36100330 IR THORACENTESIS

## 2024-05-03 PROCEDURE — 36415 COLL VENOUS BLD VENIPUNCTURE: CPT

## 2024-05-03 PROCEDURE — 71045 X-RAY EXAM CHEST 1 VIEW: CPT

## 2024-05-03 PROCEDURE — 0W9B30Z DRAINAGE OF LEFT PLEURAL CAVITY WITH DRAINAGE DEVICE, PERCUTANEOUS APPROACH: ICD-10-PCS | Performed by: RADIOLOGY

## 2024-05-03 PROCEDURE — 84443 ASSAY THYROID STIM HORMONE: CPT

## 2024-05-03 PROCEDURE — BB4BZZZ ULTRASONOGRAPHY OF PLEURA: ICD-10-PCS | Performed by: RADIOLOGY

## 2024-05-03 PROCEDURE — 85610 PROTHROMBIN TIME: CPT

## 2024-05-03 PROCEDURE — 25000000 HC PHARMACY GENERAL: Performed by: PHYSICIAN ASSISTANT

## 2024-05-03 PROCEDURE — C1729 CATH, DRAINAGE: HCPCS

## 2024-05-03 PROCEDURE — 82310 ASSAY OF CALCIUM: CPT

## 2024-05-03 RX ORDER — LIDOCAINE HYDROCHLORIDE 10 MG/ML
INJECTION, SOLUTION INFILTRATION; PERINEURAL
Status: COMPLETED | OUTPATIENT
Start: 2024-05-03 | End: 2024-05-03

## 2024-05-03 RX ADMIN — LIDOCAINE HYDROCHLORIDE 10 ML: 10 INJECTION, SOLUTION INFILTRATION; PERINEURAL at 11:05

## 2024-05-03 NOTE — DISCHARGE INSTRUCTIONS
THORACENTESIS    DISCHARGE INSTRUCTIONS  You have had a thoracentesis, or the fluid removed from around your lung.  You may resume your normal diet.  You may resume your normal medications.   Do not drive, engage in heavy lifting or strenuous activity or drink any alcoholic beverages for the next 24 hours.  You may resume normal activity in 24 hours, as tolerated.  Keep the area covered and dry for the next 24 hours. Do not submerge the area in water (i.e. tub baths, hot tubs or swimming pools for 5 days). You may shower.   It is normal to experience some pain at the site over the next few days. You may take Tylenol for that pain.  Notify your primary physician and/or Interventional Radiologist IMMEDIATELY if any of the following occur:  · Fever of 101° F (38 ° C) or chills  · Swelling and or redness in the area of the puncture site  · Worsening pain  · Lightheadedness or dizziness upon standing  - Worsening shortness of breath  - Sudden severe chest pain.  Physician Contact Information  If you have a problem that you believe requires immediate attention, you should go to the Emergency Room, either at [facility] or the closest hospital. If you believe that your problem can safely wait until you speak to a physician, you should contact your doctor or Interventional Radiologist.   It is usually easier to contact your own physician by calling the office, or after hours through the answering service. If you do not know the number, the hospital  can connect you by calling [phone].   If you have concerns that need to be answered by the Interventional Radiologist, you can reach him/ her as follows:   During regular weekday hours (8AM to 5PM), call [phone] and ask the technologist to connect you with the Interventional Radiologist.   During off hours for emergencies call [phone] and ask the hospital  to contact the Interventional Radiologist on-call.    [facility] strongly recommends that you visit a Primary Care Provider (PCP) regularly. Your PCP can help you implement the recommendations we gave you today, coordinate care among your specialists, as well as make sure you are up to date with wellness exams, immunizations and preventive screenings. Your PCP can also help when you are feeling sick, potentially avoiding the need for urgent care or emergency department visits. For these reasons, it is important that you follow up with your PCP at least annually or more often based upon your medical conditions. If you do not have a PCP, please call [phone] ([phone]) or go to Find a Doctor for help with finding one.

## 2024-05-03 NOTE — POST-PROCEDURE NOTE
Interventional Radiology Brief Postprocedure Note    Samy Elena     Attending: SOPHIA Seals / Goyo Burnham M.D    Assistant: n/a    Diagnosis: symptomatic pleural effusion, post cardiac surgery    Description of procedure: US guided left thoracentesis    Contrast: none     Anesthesia:  Local (Lidocaine)    Volume of Lidocaine Utilized (ml): 10 ml     Medications: none     Complications: None      Estimated Blood Loss: Estimated Blood Loss: 0-10 ml    Anticoagulation: n/a    Specimens: 1L clear yellow fluid    Findings: Successful US guided left thoracentesis with 1L removed. Large effusion remains. CXR pending.    5/3/2024 11:12 AM

## 2024-05-03 NOTE — H&P
Interventional Radiology Abbreviated Preprocedure H&P    History: Patient is a 89 y.o. male who presents for left thoracentesis, recent cardiac surgery with increasing SOLIS per son. Taking plavix.    Physical Exam: Awake, alert, no acute distress   No respiratory distress.    Imaging: prior thoracentesis, no recent CXR demonstrating effusion    Assessment/Plan: US left chest with left effusion large, plan for left thoracentesis.

## 2024-05-03 NOTE — OR SURGEON
Pre-Procedure patient identification:  I am the primary operating surgeon/proceduralist and I have reviewed the applicable pathology reports and radiology studies for this procedure. I have identified the patient on 05/03/24 at 11:00 AM SOPHIA Seals

## 2024-05-03 NOTE — OR SURGEON
Pre-Procedure patient identification:  I am the primary operating surgeon/proceduralist and I have reviewed the applicable pathology reports and radiology studies for this procedure. I have identified the patient on 05/03/24 at 11:12 AM SOPHIA Seals

## 2024-05-10 LAB — MYCOBACTERIUM SPEC CULT: NORMAL

## 2024-05-31 ENCOUNTER — APPOINTMENT (OUTPATIENT)
Dept: RADIOLOGY | Facility: HOSPITAL | Age: 89
End: 2024-05-31
Attending: PHYSICIAN ASSISTANT
Payer: MEDICARE

## 2024-05-31 ENCOUNTER — HOSPITAL ENCOUNTER (OUTPATIENT)
Dept: RADIOLOGY | Facility: HOSPITAL | Age: 89
Discharge: HOME | End: 2024-05-31
Attending: PHYSICIAN ASSISTANT | Admitting: RADIOLOGY
Payer: MEDICARE

## 2024-05-31 ENCOUNTER — TRANSCRIBE ORDERS (OUTPATIENT)
Dept: LAB | Facility: HOSPITAL | Age: 89
End: 2024-05-31

## 2024-05-31 ENCOUNTER — APPOINTMENT (OUTPATIENT)
Dept: LAB | Facility: HOSPITAL | Age: 89
End: 2024-05-31
Attending: INTERNAL MEDICINE
Payer: MEDICARE

## 2024-05-31 VITALS
RESPIRATION RATE: 16 BRPM | DIASTOLIC BLOOD PRESSURE: 58 MMHG | OXYGEN SATURATION: 96 % | HEART RATE: 66 BPM | SYSTOLIC BLOOD PRESSURE: 114 MMHG

## 2024-05-31 DIAGNOSIS — I50.20 UNSPECIFIED SYSTOLIC (CONGESTIVE) HEART FAILURE (CMS/HCC): Primary | ICD-10-CM

## 2024-05-31 DIAGNOSIS — I48.20 CHRONIC ATRIAL FIBRILLATION (CMS/HCC): ICD-10-CM

## 2024-05-31 DIAGNOSIS — I50.20 HEART FAILURE WITH REDUCED LEFT VENTRICULAR FUNCTION (CMS/HCC): ICD-10-CM

## 2024-05-31 DIAGNOSIS — J90 PLEURAL EFFUSION: ICD-10-CM

## 2024-05-31 DIAGNOSIS — I50.20 UNSPECIFIED SYSTOLIC (CONGESTIVE) HEART FAILURE (CMS/HCC): ICD-10-CM

## 2024-05-31 LAB
ANION GAP SERPL CALC-SCNC: 8 MEQ/L (ref 3–15)
BUN SERPL-MCNC: 32 MG/DL (ref 7–25)
CALCIUM SERPL-MCNC: 8.5 MG/DL (ref 8.6–10.3)
CHLORIDE SERPL-SCNC: 108 MEQ/L (ref 98–107)
CO2 SERPL-SCNC: 27 MEQ/L (ref 21–31)
CREAT SERPL-MCNC: 2.1 MG/DL (ref 0.7–1.3)
EGFRCR SERPLBLD CKD-EPI 2021: 29 ML/MIN/1.73M*2
ERYTHROCYTE [DISTWIDTH] IN BLOOD BY AUTOMATED COUNT: 17.7 % (ref 11.6–14.4)
GLUCOSE SERPL-MCNC: 104 MG/DL (ref 70–99)
HCT VFR BLD AUTO: 32 % (ref 40.1–51)
HGB BLD-MCNC: 10.1 G/DL (ref 13.7–17.5)
INR PPP: 1.5
MCH RBC QN AUTO: 32.1 PG (ref 28–33.2)
MCHC RBC AUTO-ENTMCNC: 31.6 G/DL (ref 32.2–36.5)
MCV RBC AUTO: 101.6 FL (ref 83–98)
PDW BLD AUTO: 10.8 FL (ref 9.4–12.4)
PLATELET # BLD AUTO: 110 K/UL (ref 150–350)
POTASSIUM SERPL-SCNC: 4.7 MEQ/L (ref 3.5–5.1)
PROTHROMBIN TIME: 18.2 SEC (ref 12.2–14.5)
RBC # BLD AUTO: 3.15 M/UL (ref 4.5–5.8)
SODIUM SERPL-SCNC: 143 MEQ/L (ref 136–145)
WBC # BLD AUTO: 4.28 K/UL (ref 3.8–10.5)

## 2024-05-31 PROCEDURE — 85027 COMPLETE CBC AUTOMATED: CPT

## 2024-05-31 PROCEDURE — 36415 COLL VENOUS BLD VENIPUNCTURE: CPT

## 2024-05-31 PROCEDURE — 71045 X-RAY EXAM CHEST 1 VIEW: CPT

## 2024-05-31 PROCEDURE — BB4BZZZ ULTRASONOGRAPHY OF PLEURA: ICD-10-PCS | Performed by: STUDENT IN AN ORGANIZED HEALTH CARE EDUCATION/TRAINING PROGRAM

## 2024-05-31 PROCEDURE — 85610 PROTHROMBIN TIME: CPT

## 2024-05-31 PROCEDURE — 80048 BASIC METABOLIC PNL TOTAL CA: CPT

## 2024-05-31 PROCEDURE — C1729 CATH, DRAINAGE: HCPCS

## 2024-05-31 PROCEDURE — 0W9B30Z DRAINAGE OF LEFT PLEURAL CAVITY WITH DRAINAGE DEVICE, PERCUTANEOUS APPROACH: ICD-10-PCS | Performed by: STUDENT IN AN ORGANIZED HEALTH CARE EDUCATION/TRAINING PROGRAM

## 2024-05-31 PROCEDURE — 36100330 IR THORACENTESIS

## 2024-05-31 NOTE — OR SURGEON
Pre-Procedure patient identification:  I am the primary operating surgeon/proceduralist and I have reviewed the applicable pathology reports and radiology studies for this procedure. I have identified the patient on 05/31/24 at 10:07 AM SOPHIA Seals

## 2024-05-31 NOTE — POST-PROCEDURE NOTE
Interventional Radiology Brief Postprocedure Note    Samy Elena     Attending: SOPHIA Seals / Elio Espinoza M.D.    Assistant: n/a    Diagnosis: SOLIS    Description of procedure: US guided left thoracentesis    Contrast: none     Anesthesia:  Local (Lidocaine)    Volume of Lidocaine Utilized (ml): 8ml     Medications: none     Complications: None      Estimated Blood Loss: Estimated Blood Loss: None    Anticoagulation: n/a    Specimens: 1.2L clear yellow fluid    Findings: Successful US Guided left thoracentesis. 1.2L removed and discarded. VSS throughout. CXR pending.    5/31/2024 10:21 AM

## 2024-05-31 NOTE — DISCHARGE INSTRUCTIONS
THORACENTESIS    DISCHARGE INSTRUCTIONS  You have had a thoracentesis, or the fluid removed from around your lung.  You may resume your normal diet.  You may resume your normal medications.   Do not drive, engage in heavy lifting or strenuous activity or drink any alcoholic beverages for the next 24 hours.  You may resume normal activity in 24 hours, as tolerated.  Keep the area covered and dry for the next 24 hours. Do not submerge the area in water (i.e. tub baths, hot tubs or swimming pools for 5 days). You may shower.   It is normal to experience some pain at the site over the next few days. You may take Tylenol for that pain.  Notify your primary physician and/or Interventional Radiologist IMMEDIATELY if any of the following occur:  · Fever of 101° F (38 ° C) or chills  · Swelling and or redness in the area of the puncture site  · Worsening pain  · Lightheadedness or dizziness upon standing  - Worsening shortness of breath  - Sudden severe chest pain.  Physician Contact Information  If you have a problem that you believe requires immediate attention, you should go to the Emergency Room, either at [facility] or the closest hospital. If you believe that your problem can safely wait until you speak to a physician, you should contact your doctor or Interventional Radiologist.   It is usually easier to contact your own physician by calling the office, or after hours through the answering service. If you do not know the number, the hospital  can connect you by calling [phone].   If you have concerns that need to be answered by the Interventional Radiologist, you can reach him/ her as follows:   During regular weekday hours (8AM to 5PM), call [phone] and ask the technologist to connect you with the Interventional Radiologist.   During off hours for emergencies call [phone] and ask the hospital  to contact the Interventional Radiologist on-call.    [facility] strongly recommends that you visit a Primary Care Provider (PCP) regularly. Your PCP can help you implement the recommendations we gave you today, coordinate care among your specialists, as well as make sure you are up to date with wellness exams, immunizations and preventive screenings. Your PCP can also help when you are feeling sick, potentially avoiding the need for urgent care or emergency department visits. For these reasons, it is important that you follow up with your PCP at least annually or more often based upon your medical conditions. If you do not have a PCP, please call [phone] ([phone]) or go to Find a Doctor for help with finding one.

## 2024-06-25 ENCOUNTER — PRE-ADMISSION TESTING (OUTPATIENT)
Dept: PREADMISSION TESTING | Age: 89
End: 2024-06-25
Payer: MEDICARE

## 2024-06-25 VITALS — BODY MASS INDEX: 21.13 KG/M2 | HEIGHT: 72 IN | WEIGHT: 156 LBS

## 2024-06-25 RX ORDER — OMEPRAZOLE 20 MG/1
20 CAPSULE, DELAYED RELEASE ORAL
COMMUNITY
Start: 2024-05-25 | End: 2024-10-15

## 2024-06-25 NOTE — PRE-PROCEDURE INSTRUCTIONS
[address] [address]    1.       You will be called between 1pm-5pm one business day before your procedure to tell you where and when to report. If you do not receive a phone call by 6pm, please call the Operating Room at [phone].    2.        Please report to Surgery Registration on the day of your procedure. You can park in the east garage, enter the front doors of the new Pavilion, and take the [facility] elevators to the second floor. Follow signs to Surgery Registration.       3. Please follow the following fasting guidelines:     No solid food EIGHT HOURS prior to arrival time on day of surgery.  6 ounces of clear liquids, meaning water or PLAIN black coffee WITHOUT any milk, cream, sugar, or sweetener are permitted up to TWO HOURS prior to arrival at the hospital.    4. Please take ONLY the following medications with a sip of water on the morning of your procedure: (populate names and/or NONE)  Lexapro, synthroid,     5. Other Instructions: You may brush your teeth the morning of the procedure. Rinse and spit, do not swallow.  Bring a list of your medications with dosages.  Use surgical wash as directed.     6. If you develop a cold, cough, fever, rash, or other symptom prior to the day of the procedure, please report it to your physician immediately.    7. If you need to cancel the procedure for any reason, please contact your physician.    8. Make arrangements to have safe transportation home accompanied by a responsible adult. If you have not arranged safe transportation home, your surgery will be cancelled. Safe transportation may include private vehicle, ride-share service, taxi and public transportation when accompanied by a responsible adult who will assist you home. A responsible adult is someone known to you and does not include the taxi, ride-share or public transit drive transporting you.    9.  If it is medically necessary for you to have a longer stay, you  will be informed as soon as the decision is made.    10. Only bring essential items to the hospital.  Do not wear or bring anything of value to the hospital including jewelry of any kind, money, or wallet. Do not wear make-up or contact lenses.  DO NOT BRING MEDICATIONS FROM HOME unless instructed to do so. DO bring your hearing aids, glasses, and a case    11. No lotion, creams, powders, or oils on skin the morning of procedure     12. Dress in comfortable clothes.    13.  If instructed, please bring a copy of your Advanced Directive (Living Will/Durable Power of ) on the day of your procedure.     14. Ensuring your safety at all times is a very important part of our [facility] Culture of Safety. After having surgery and sedation, you are at risk for falling and balance issues. Although you may feel awake, the effects of the medication can last up to 24 hours after anesthesia. If you need to use the bathroom during your recovery period, nursing staff will escort you there and stay with you to ensure your safety.    15. Refrain from drinking alcohol and smoking cigarettes for 24 hours prior to surgery.    16. Shower with antibacterial soap (Dial) the night before and morning of your procedure.  If your procedure indicates the need for CHG antiseptic wash (Bactoshield or Hibiclens), please use this instead and follow instructions as discussed at the time of your Pre-Admission Testing phone interview or visit.    Above instructions reviewed with patient and patient acknowledges understanding.    Form explained by: Michelle Menendez RN

## 2024-07-02 ENCOUNTER — APPOINTMENT (OUTPATIENT)
Dept: LAB | Facility: HOSPITAL | Age: 89
End: 2024-07-02
Attending: OPHTHALMOLOGY
Payer: MEDICARE

## 2024-07-02 ENCOUNTER — TRANSCRIBE ORDERS (OUTPATIENT)
Dept: REGISTRATION | Facility: HOSPITAL | Age: 89
End: 2024-07-02

## 2024-07-02 ENCOUNTER — PRE-ADMISSION TESTING (OUTPATIENT)
Dept: PREADMISSION TESTING | Facility: HOSPITAL | Age: 89
End: 2024-07-02
Attending: OPHTHALMOLOGY
Payer: MEDICARE

## 2024-07-02 ENCOUNTER — HOSPITAL ENCOUNTER (OUTPATIENT)
Dept: CARDIOLOGY | Facility: HOSPITAL | Age: 89
Discharge: HOME | End: 2024-07-02
Attending: OPHTHALMOLOGY
Payer: MEDICARE

## 2024-07-02 VITALS
OXYGEN SATURATION: 98 % | TEMPERATURE: 97.9 F | HEIGHT: 73 IN | BODY MASS INDEX: 21.2 KG/M2 | HEART RATE: 83 BPM | SYSTOLIC BLOOD PRESSURE: 113 MMHG | DIASTOLIC BLOOD PRESSURE: 51 MMHG | WEIGHT: 160 LBS | RESPIRATION RATE: 16 BRPM

## 2024-07-02 DIAGNOSIS — D69.6 THROMBOCYTOPENIA (CMS/HCC): ICD-10-CM

## 2024-07-02 DIAGNOSIS — Z93.3 COLOSTOMY IN PLACE (CMS/HCC): ICD-10-CM

## 2024-07-02 DIAGNOSIS — N18.4 CKD (CHRONIC KIDNEY DISEASE) STAGE 4, GFR 15-29 ML/MIN (CMS/HCC): ICD-10-CM

## 2024-07-02 DIAGNOSIS — R27.0 ATAXIA: ICD-10-CM

## 2024-07-02 DIAGNOSIS — H25.011 CORTICAL AGE-RELATED CATARACT, RIGHT EYE: Primary | ICD-10-CM

## 2024-07-02 DIAGNOSIS — N18.4 ANEMIA DUE TO STAGE 4 CHRONIC KIDNEY DISEASE (CMS/HCC): ICD-10-CM

## 2024-07-02 DIAGNOSIS — H52.221 REGULAR ASTIGMATISM, RIGHT EYE: ICD-10-CM

## 2024-07-02 DIAGNOSIS — Z86.79 HISTORY OF CHRONIC CHF: ICD-10-CM

## 2024-07-02 DIAGNOSIS — Z91.89 AT RISK FOR OBSTRUCTIVE SLEEP APNEA: ICD-10-CM

## 2024-07-02 DIAGNOSIS — F32.A DEPRESSION, UNSPECIFIED DEPRESSION TYPE: ICD-10-CM

## 2024-07-02 DIAGNOSIS — I42.0 ISCHEMIC CONGESTIVE CARDIOMYOPATHY (CMS/HCC): ICD-10-CM

## 2024-07-02 DIAGNOSIS — E03.9 HYPOTHYROIDISM, UNSPECIFIED TYPE: ICD-10-CM

## 2024-07-02 DIAGNOSIS — Z01.818 PREOP EXAMINATION: ICD-10-CM

## 2024-07-02 DIAGNOSIS — H40.9 GLAUCOMA, UNSPECIFIED GLAUCOMA TYPE, UNSPECIFIED LATERALITY: ICD-10-CM

## 2024-07-02 DIAGNOSIS — I48.91 ATRIAL FIBRILLATION, UNSPECIFIED TYPE (CMS/HCC): ICD-10-CM

## 2024-07-02 DIAGNOSIS — H25.011 CORTICAL AGE-RELATED CATARACT, RIGHT EYE: ICD-10-CM

## 2024-07-02 DIAGNOSIS — D63.1 ANEMIA DUE TO STAGE 4 CHRONIC KIDNEY DISEASE (CMS/HCC): ICD-10-CM

## 2024-07-02 DIAGNOSIS — H25.9 SENILE CATARACT OF RIGHT EYE, UNSPECIFIED AGE-RELATED CATARACT TYPE: ICD-10-CM

## 2024-07-02 DIAGNOSIS — K21.9 GASTROESOPHAGEAL REFLUX DISEASE, UNSPECIFIED WHETHER ESOPHAGITIS PRESENT: ICD-10-CM

## 2024-07-02 DIAGNOSIS — I25.5 ISCHEMIC CONGESTIVE CARDIOMYOPATHY (CMS/HCC): ICD-10-CM

## 2024-07-02 DIAGNOSIS — Z87.440 HISTORY OF RECURRENT UTIS: ICD-10-CM

## 2024-07-02 DIAGNOSIS — Z86.73 HISTORY OF TRANSIENT ISCHEMIC ATTACK (TIA): ICD-10-CM

## 2024-07-02 PROBLEM — N18.9 ANEMIA DUE TO CHRONIC KIDNEY DISEASE: Status: ACTIVE | Noted: 2024-07-02

## 2024-07-02 LAB
ERYTHROCYTE [DISTWIDTH] IN BLOOD BY AUTOMATED COUNT: 18.1 % (ref 11.6–14.4)
HCT VFR BLD AUTO: 30.7 % (ref 40.1–51)
HGB BLD-MCNC: 9.6 G/DL (ref 13.7–17.5)
MCH RBC QN AUTO: 32.1 PG (ref 28–33.2)
MCHC RBC AUTO-ENTMCNC: 31.3 G/DL (ref 32.2–36.5)
MCV RBC AUTO: 102.7 FL (ref 83–98)
PDW BLD AUTO: 10.8 FL (ref 9.4–12.4)
PLATELET # BLD AUTO: 101 K/UL (ref 150–350)
RBC # BLD AUTO: 2.99 M/UL (ref 4.5–5.8)
WBC # BLD AUTO: 5.1 K/UL (ref 3.8–10.5)

## 2024-07-02 PROCEDURE — 85027 COMPLETE CBC AUTOMATED: CPT | Mod: GA

## 2024-07-02 PROCEDURE — 36415 COLL VENOUS BLD VENIPUNCTURE: CPT

## 2024-07-02 ASSESSMENT — PAIN SCALES - GENERAL: PAINLEVEL: 0-NO PAIN

## 2024-07-02 NOTE — ASSESSMENT & PLAN NOTE
Ongoing: His GFR (29) on May 31, 2024.  Continue to avoid nephrotoxins such as NSAIDs.  Continue to address modifiable below risk factors such as blood pressure.  PCP follows.

## 2024-07-02 NOTE — ASSESSMENT & PLAN NOTE
Stable: His hemoglobin has ranged from (9.6) to (10.4) in the last 6 months.  His PCP follows.  Consider oral iron repletion.

## 2024-07-02 NOTE — ASSESSMENT & PLAN NOTE
Ongoing: He is maintained on metoprolol which he was instructed to take as usual the day before surgery.  He denies any exertional chest pain or PND.  He is followed by Dr. Mondragon, cardiology.

## 2024-07-02 NOTE — ASSESSMENT & PLAN NOTE
Ongoing: He presents today in wheelchair.  He reports he continues with physical therapy.  PCP follows.

## 2024-07-02 NOTE — ASSESSMENT & PLAN NOTE
Historical: He and his adult son, present for visit, reports this occurred during a complex hospital stay.  Continue to address modifiable risk factors such as blood pressure.  He is maintained on Plavix which she was instructed to stop taking 5 to 7 days prior per his prescribing provider.  He also takes metoprolol which he was instructed to take as usual the day before surgery.  He denies any residual weakness or difficulty with swallowing or word finding.

## 2024-07-02 NOTE — ASSESSMENT & PLAN NOTE
Stable: He reports his weight has been stable the last 6 months.  He presents without peripheral edema on exam today.  He is maintained on torsemide which she was instructed not to take the morning of surgery to mitigate against the risk of hypotension.

## 2024-07-02 NOTE — ASSESSMENT & PLAN NOTE
Ongoing: He reports this is silent for him, he is asymptomatic.  He denies feeling palpitations dizziness chest pain or PND.  He is rate controlled on metoprolol.  Dr. Jeffrey follows.  He was instructed to take metoprolol as usual the day before surgery.

## 2024-07-02 NOTE — ASSESSMENT & PLAN NOTE
Ongoing: He is maintained on Lumigan which he was instructed to take as usual the night before surgery.

## 2024-07-02 NOTE — PRE-PROCEDURE INSTRUCTIONS
[address] [address]    1. You will be called between 1pm-5pm one business day before your procedure to tell you where and when to report. If you do not receive a phone call by 6pm, please call the Operating Room at [phone].    2. Please report to Surgery Registration on the day of your procedure. You can park in the east garage, enter the front doors of the new [facility], and take the [facility] elevators to the second floor. Follow signs to Surgery Registration.         3. Please follow the following fasting guidelines:     No solid food EIGHT HOURS prior to arrival time on day of surgery.  6 ounces of clear liquids, meaning water or PLAIN black coffee WITHOUT any milk, cream, sugar, or sweetener are permitted up to TWO HOURS prior to arrival at the hospital.    4. Please take ONLY the following medications with a sip of water on the morning of your surgery: LEVOTHYROXINE, POTASSIUM, LEXAPRO    DO NOT TAKE TORSEMIDE THE MORNING OF SURGERY    STOP PLAVIX PER PRECRBING PROVIDER 5-7 DAYS BEFORE PROCEDURE    5. Other Instructions: You may brush your teeth the morning of the procedure. Rinse and spit, do not swallow.  Bring a list of your medications with dosages.  Use surgical wash as directed.     6. If you develop a cold, cough, fever, rash, or other symptom prior to the day of the procedure, please report it to your physician immediately.    7. If you need to cancel the procedure for any reason, please contact your physician.    8. Make arrangements to have safe transportation home accompanied by a responsible adult. If you have not arranged safe transportation home, your surgery will be cancelled. Safe transportation may include private vehicle, ride-share service, taxi and public transportation when accompanied by a responsible adult who will assist you home. A responsible adult is someone known to you and does not include the taxi, ride-share or public transit drive  transporting you.    9.  If it is medically necessary for you to have a longer stay, you will be informed as soon as the decision is made.    10. Only bring essential items to the hospital.  Do not wear or bring anything of value to the hospital including jewelry of any kind, money, or wallet. Do not wear make-up or contact lenses.  DO NOT BRING MEDICATIONS FROM HOME unless instructed to do so. DO bring your hearing aids, glasses, and a case    11. No lotion, creams, powders, or oils on skin the morning of procedure     12. Dress in comfortable clothes.    13.  If instructed, please bring a copy of your Advanced Directive (Living Will/Durable Power of ) on the day of your procedure.     14. Ensuring your safety at all times is a very important part of our [facility] Culture of Safety. After having surgery and sedation, you are at risk for falling and balance issues. Although you may feel awake, the effects of the medication can last up to 24 hours after anesthesia. If you need to use the bathroom during your recovery period, nursing staff will escort you there and stay with you to ensure your safety.    15. Refrain from drinking alcohol and smoking cigarettes for 24 hours prior to surgery.    16. Shower with antibacterial soap (Dial) the night before and morning of your procedure.  If your procedure indicates the need for CHG antiseptic wash (Bactoshield or Hibiclens), please use this instead and follow instructions as discussed at the time of your Pre-Admission Testing phone interview or visit.    Above instructions reviewed with patient and patient acknowledges understanding.    Form explained by: HEIDI Oh

## 2024-07-02 NOTE — ASSESSMENT & PLAN NOTE
Mr Mcbride presents to preadmission testing today for medical evaluation in advance of his planned right eye cataract repair with Dr. Moses on July 18, 2024.  I personally reviewed his labs and EKG and they are unremarkable unless noted otherwise.  He was instructed to stop taking Plavix per prescribing provider 5-7 days prior to surgery.    The Revised Cardiac Risk Index (RCRI) = 3 for this patient which indicates a 11% risk of major adverse cardiac event in the perioperative period for this low risk procedure.

## 2024-07-02 NOTE — ASSESSMENT & PLAN NOTE
Stable: He is maintained on famotidine and omeprazole which he was instructed to take as usual the day before surgery.

## 2024-07-02 NOTE — ASSESSMENT & PLAN NOTE
Ongoing: He is maintained on Lexapro which he was instructed to take as usual the morning of surgery with a sip of water.

## 2024-07-02 NOTE — ASSESSMENT & PLAN NOTE
Ongoing: He is maintained on methenamine which he was instructed to take as usual the day before surgery.

## 2024-07-02 NOTE — ASSESSMENT & PLAN NOTE
STOP BANG Score = 3    Sleep apnea  Sleep apnea places the patient at relatively increased risk (compared to a population without this diagnosis) of oxygen desaturation, cardiac events, acute respiratory failure, or an ICU transfer.  He/She is / is not compliant with CPAP.    In the post op period: recommend use of our sleep apnea protocol in the PACU, pulse ox monitoring, consider extubate to BIPAP.    Reduce narcotic medication dosage as much as possible.

## 2024-07-02 NOTE — H&P (VIEW-ONLY)
"   Preadmission Testing-  Pre-Operative H&P       Patient Name: Samy Elena  Referring Surgeon: Dr Moses    Reason for Referral: Pre-Operative Evaluation  Surgical Procedure: cataract extraction right eye with implant and limbal relaxing incision  Operative Date: 07/18/2024  Other Providers:      PCP: Zachery Hernandez MD   Cardiology: Dr Macias   GI: Dr De Oliveira      HISTORY OF PRESENT ILLNESS      Samy Elena is a 89 y.o. male presenting today to the [facility] at [facility] for pre-operative evaluation prior to planned surgery, cataract extraction right eye with implant and limbal relaxing incision    HPI:  He reports, \"My regular eye doctor retired so it was a matter of finding someone else.  My vision loss is age related.\"  He reports \"blurred vision and double vision\" He denies ocular pain or drainage.  He had cataract repair of Left Eye November 2023 and reports uneventful recovery..  He presents with his adult son today    PMH:  AFIB-->Metoprolol, take as usual the night before surgery  CAD-->MI 2021, medically managed. Plavix, stop 5 days before surgery per prescribing provider  HX TIA (2022)  CHF-->Torsemide, do not take 24H before surgery. Potassium, take as usual the morning of surgery  Anemia-->HGB (10.1) on 05/31/2024. His HGB today (9.6).  Thrombocytopenia-->PLT (101) today  CKD-->GFR (29.0) on 05/31/2024  Hypothyroid-->TSH (0.08) on 05/03/2024. Synthroid, take as usual the morning of surgery with a sip of water  Glaucoma-->Lumigan, take as usual the night before surgery  Depression-->Lexapro, take as usual the morning of surgery with a sip of water  GERD-->Famotidine and Omeprazole,  take as usual the day before surgery  HX UTIS-->Methenamine, take as usual the day before surgery  Cataract (Right)-->planned repair as above  BMI (21)    The patient denies any current or recent chest pain or pressure, dyspnea, cough, sputum, " fevers, chills, abdominal pain, nausea, vomiting, diarrhea or other symptoms.     Functionally, the patient is able to ascend a flight or so of stairs with no dyspnea or chest pain. He can exert to his degree possible without exertional chest pain.  Functional capacity > 4 METS.    The patient denies, on specific questioning, the following:  + history of MI (2021), +valvular disease, +arrhythmia (AFIB), +CHF.  No history of RONNIE.  No history of DVT/PE.  No history of COPD.  No history of CVA.  No history of DM.   + history of CKD.   No history of liver disease or cirrhosis.  No history of bleeding disorder.  No history of difficult airway/difficult intubation.  No history of Malignant hyperthermia.    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)     Coronary artery disease     Disease of thyroid gland     Will catheter in place     6/25 not at present    GI (gastrointestinal bleed)     Hypertension     Myocardial infarction (CMS/HCC)     Orthostatic hypotension     TIA (transient ischemic attack)        Past Surgical History:   Procedure Laterality Date    CATARACT EXTRACTION W/ INTRAOCULAR LENS IMPLANT Left     CHOLECYSTECTOMY      COLOSTOMY      CORONARY ARTERY BYPASS GRAFT      JOINT REPLACEMENT Left     Knee    THYROIDECTOMY         MEDICATIONS        Current Outpatient Medications:     bimatoprost (LUMIGAN) 0.01 % ophthalmic drops, Administer 1 drop into the left eye nightly., Disp: , Rfl:     calcium carbonate (TUMS) 200 mg calcium (500 mg) chewable tablet, Take 1 tablet by mouth 2 (two) times a day., Disp: , Rfl:     clopidogreL (PLAVIX) 75 mg tablet, Take 75 mg by mouth every morning., Disp: , Rfl:     cyanocobalamin (VITAMIN B12) 500 mcg tablet, Take 500 mcg by mouth every  morning., Disp: , Rfl:     escitalopram (LEXAPRO) 5 mg tablet, Take 5 mg by mouth every morning., Disp: , Rfl:     famotidine (PEPCID) 10 mg tablet, Take 1 tablet (10 mg total) by mouth daily Indications: gastroesophageal reflux disease., Disp: 30 tablet, Rfl: 0    lidocaine (ASPERCREME) 4 % adhesive patch,medicated topical patch, Apply 1 patch topically daily as needed. Remove & discard patch within 12 hours or as directed by prescriber., Disp: , Rfl:     loteprednol etabonate (LOTEMAX) 0.5 % ointment, Apply 1 Application to left eye 3 (three) times a day., Disp: , Rfl:     magnesium oxide (MAG-OX) 400 mg (241.3 mg magnesium) tablet, Take 200 mg by mouth 4 (four) times a day. Difficult to swallow at 400 mg, so son found 200 mg. So taking 4 times a day., Disp: , Rfl:     melatonin 3 mg tablet, Take 3 mg by mouth nightly., Disp: , Rfl:     methenamine (HIPREX) 1 gram tablet, Take 1 g by mouth every morning., Disp: , Rfl:     metoprolol succinate XL (TOPROL-XL) 25 mg 24 hr tablet, Take 0.5 tablets (12.5 mg total) by mouth daily. (Patient taking differently: Take 12.5 mg by mouth daily. 5pm), Disp: 15 tablet, Rfl: 0    omeprazole (PriLOSEC) 20 mg capsule, Take 20 mg by mouth every morning., Disp: , Rfl:     potassium chloride (KLOR-CON) 10 mEq CR tablet, Take 20 mEq by mouth 2 (two) times a day., Disp: , Rfl:     SYNTHROID 100 mcg tablet, Take 1 tablet (100 mcg total) by mouth daily. (Patient taking differently: Take 100 mcg by mouth daily.), Disp: 30 tablet, Rfl: 0    thiamine 50 mg tablet, Take 50 mg by mouth daily., Disp: , Rfl:     torsemide (DEMADEX) 10 mg tablet, Take 10 mg by mouth daily before breakfast., Disp: , Rfl:     ALLERGIES      Jardiance [empagliflozin]    FAMILY HISTORY      family history is not on file.    Denies any prior known family history of DVTs/PEs/clotting disorder    SOCIAL HISTORY      Social History     Tobacco Use    Smoking status: Never    Smokeless tobacco: Never   Vaping Use     "Vaping Use: Never used   Substance Use Topics    Alcohol use: Not Currently     Comment: social    Drug use: Never       REVIEW OF SYSTEMS      Constitution: Negative for decreased appetite, diaphoresis, fever, malaise/fatigue, weight gain and weight loss.   HENT: +[place] has HA  Eyes: +\"Blurred vision and double vision\"  Cardiovascular: Negative for chest pain, +dyspnea on exertion, irregular heartbeat, leg swelling, near-syncope, orthopnea, palpitations, paroxysmal nocturnal dyspnea and syncope.   Respiratory: Negative for cough, hemoptysis, shortness of breath, sleep disturbances due to breathing and snoring.    Hematologic/Lymphatic: Does not bruise/bleed easily.   Skin: Negative for rash.   Musculoskeletal: +CLBP, \"I think it's arthritis\" Negative for myalgias.   Gastrointestinal: Negative for heartburn, hematemesis, hematochezia and melena.   Genitourinary: Negative for hematuria and nocturia.   Neurological: Negative for dizziness and light-headedness.   Psychiatric/Behavioral: Negative for altered mental status. The patient does not have insomnia.      All other systems reviewed and negative except as noted in HPI    PHYSICAL EXAMINATION      Visit Vitals  BP (!) 113/51   Pulse 83   Temp 36.6 °C (97.9 °F)   Resp 16   Ht 1.854 m (6' 1\")   Wt 72.6 kg (160 lb)   SpO2 98%   BMI 21.11 kg/m²     Body mass index is 21.11 kg/m².    Physical Exam  Constitutional: Patient is oriented to person, place, and time. The patient appears well-developed and cooperative. No distress.   HENT:   Head: Normocephalic and atraumatic.   Mouth/Throat: Oropharynx is clear and moist. +fair dentition, no dentures  Eyes: Pupils are equal, round, and reactive to light. No scleral icterus. +corrective lenses in place  Neck: Normal range of motion. Neck supple.   Cardiovascular: Normal rate and regular rhythm.   +murmur heard.  Pulmonary/Chest: No accessory muscle usage. No tachypnea. No respiratory distress. No decreased breath sounds, " wheezes, rhonchi, rales.  Abdominal: Soft. Bowel sounds are normal, exhibits no ascites, and no tenderness. +Colostomy  Neurological: Alert and oriented to person, place, and time.   Skin: Skin is warm, dry and intact.   Psychiatric: Has a normal mood and affect. Speech is normal and behavior is normal. Judgment and thought content normal. Cognition and memory are normal.   Nursing note and vitals reviewed.    LABS / EKG        Labs  Lab Results   Component Value Date    WBC 5.10 07/02/2024    HGB 9.6 (L) 07/02/2024    HCT 30.7 (L) 07/02/2024    .7 (H) 07/02/2024     (L) 07/02/2024     Lab Results   Component Value Date    GLUCOSE 104 (H) 05/31/2024    CALCIUM 8.5 (L) 05/31/2024     05/31/2024    K 4.7 05/31/2024    CO2 27 05/31/2024     (H) 05/31/2024    BUN 32 (H) 05/31/2024    CREATININE 2.1 (H) 05/31/2024           ECG/Telemetry  I personally reviewed his EKG of 03/28/2024:AFIB@68BPM w/ known RBBB and T wave abnormality    ECHO: 03/28/2024:  Interpretation Summary  Technically difficult study. EKG demonstrates sinus rhythm.    1. The left ventricular cavity is small in size with normal wall thickness and mildly reduced systolic function. Left ventricular ejection fraction is 45-50% by visual estimate. Low stroke volume. Abnormal septal motion due to RV compression. Diastolic function not assessed on this limited study.   2. The aortic valve is sclerotic and not well visualized, likely three-cusped. No aortic stenosis. No aortic regurgitation.   3. Normal sized aortic root. There is aortic root calcification. Normal sized ascending aorta.    4. Thickened mitral leaflets. Mild mitral annular calcification. Trace mitral regurgitation.    5. The left atrium is normal in size.   6. The right ventricle is severely dilated (indexed volume 106 mL/m^2) and apex forming. Right ventricular systolic function is severely reduced. Apical hypercontractility (Nunes's sign) present.    7. The right  atrium is massively dilated.   8. There is a large coaptation gap of the tricuspid leaflets. Wide-open tricuspid regurgitation with single chamber physiology. Unable to estimated right ventricular pressure due to severity of tricuspid regurgitation.    9. The pulmonic valve is not well visualized. Trace pulmonic regurgitation.   10. The IVC is enlarged with less than 50% respiratory variation consistent with elevated right-sided filling pressures.    11. No pericardial effusion. Large left pleural effusion.   Compared to previous TTE from 8/4/2023, there is no significant change    ASSESSMENT AND PLAN         Diagnoses and all orders for this visit:    Preop examination  Assessment & Plan:  Mr Mcbride presents to preadmission testing today for medical evaluation in advance of his planned right eye cataract repair with Dr. Moses on July 18, 2024.  I personally reviewed his labs and EKG and they are unremarkable unless noted otherwise.  He was instructed to stop taking Plavix per prescribing provider 5-7 days prior to surgery.    The Revised Cardiac Risk Index (RCRI) = 3 for this patient which indicates a 11% risk of major adverse cardiac event in the perioperative period for this low risk procedure.       Ischemic congestive cardiomyopathy (CMS/HCC)  Assessment & Plan:  Ongoing: He is maintained on metoprolol which he was instructed to take as usual the day before surgery.  He denies any exertional chest pain or PND.  He is followed by Dr. Mondragon, cardiology.      Atrial fibrillation, unspecified type (CMS/HCC)  Assessment & Plan:  Ongoing: He reports this is silent for him, he is asymptomatic.  He denies feeling palpitations dizziness chest pain or PND.  He is rate controlled on metoprolol.  Dr. Jeffrey follows.  He was instructed to take metoprolol as usual the day before surgery.      History of chronic CHF  Assessment & Plan:  Stable: He reports his weight has been stable the last 6 months.  He presents  without peripheral edema on exam today.  He is maintained on torsemide which she was instructed not to take the morning of surgery to mitigate against the risk of hypotension.      CKD (chronic kidney disease) stage 4, GFR 15-29 ml/min (CMS/[facility])  Assessment & Plan:  Ongoing: His GFR (29) on May 31, 2024.  Continue to avoid nephrotoxins such as NSAIDs.  Continue to address modifiable below risk factors such as blood pressure.  PCP follows.       Anemia due to stage 4 chronic kidney disease (CMS/[facility])  (CMS/[facility])  Assessment & Plan:  Stable: His hemoglobin has ranged from (9.6) to (10.4) in the last 6 months.  His PCP follows.  Consider oral iron repletion.       Thrombocytopenia (CMS/[facility])  Assessment & Plan:  Deteriorated:  His PLT (101)  today.  His PLT have ranged from (101) to (123) in the last 6 months. PCP follows.       History of transient ischemic attack (TIA)  Assessment & Plan:  Historical: He and his adult son, present for visit, reports this occurred during a complex hospital stay.  Continue to address modifiable risk factors such as blood pressure.  He is maintained on Plavix which she was instructed to stop taking 5 to 7 days prior per his prescribing provider.  He also takes metoprolol which he was instructed to take as usual the day before surgery.  He denies any residual weakness or difficulty with swallowing or word finding.      Gastroesophageal reflux disease, unspecified whether esophagitis present  Assessment & Plan:  Stable: He is maintained on famotidine and omeprazole which he was instructed to take as usual the day before surgery.      Hypothyroidism, unspecified type  Assessment & Plan:  Ongoing: TSH (0.08) on 05/03/2024. He is maintained on Synthroid which he was instructed to take as usual the morning of surgery with a sip of water.  PCP follows.      Depression, unspecified depression type  Assessment & Plan:  Ongoing: He is maintained on Lexapro which he was instructed to take as usual  "the morning of surgery with a sip of water.       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[facility])  Assessment & Plan:  Ongoing:  He reports in 2022 he had allergic reaction to Jardiance which yielded septic shock and ultimately Colostomy and wound up on ventilator for 10 days.  He reports he is followed annually by Dr. Annette CHENG.      Senile cataract of right eye, unspecified age-related cataract type  Assessment & Plan:  Deteriorated: He reports worsening \"blurred vision and double vision.\"  He has elected to proceed with right cataract repair as above.      Glaucoma, unspecified glaucoma type, unspecified laterality  Assessment & Plan:  Ongoing: He is maintained on Lumigan which he was instructed to take as usual the night before surgery.      Ataxia  Assessment & Plan:  Ongoing: He presents today in wheelchair.  He reports he continues with physical therapy.  PCP follows.      History of recurrent UTIs  Assessment & Plan:  Ongoing: He is maintained on methenamine which he was instructed to take as usual the day before surgery.      At risk for obstructive sleep apnea  Assessment & Plan:  STOP BANG Score = 3    Sleep apnea  Sleep apnea places the patient at relatively increased risk (compared to a population without this diagnosis) of oxygen desaturation, cardiac events, acute respiratory failure, or an ICU transfer.  He/She is / is not compliant with CPAP.    In the post op period: recommend use of our sleep apnea protocol in the PACU, pulse ox monitoring, consider extubate to BIPAP.    Reduce narcotic medication dosage as much as possible.          BMI 21.0-21.9, adult  Assessment & Plan:  Stable: PCP follows.            In regards to perioperative cardiac risk:  The patient has +history of ischemic heart disease, has+ history of CHF, denies any history of CVA, is not on pre-operative treatment with insulin, and does have a pre-operative creatinine > 2 mg/d-->(2.1)   The Revised Cardiac Risk Index (RCRI) = 3 for this patient which " indicates a 11% risk of major adverse cardiac event in the perioperative period for this low risk procedure.     Further comments:  Resume supplements when OK with surgical team.  I would encourage incentive spirometry to assist with minimizing kathleen-operative pulmonary risk.  DVT prophylaxis and timing of such per the discretion of the surgeon.     Further comments:  STOP-BANG screening for obstructive sleep apnea = 3, which indicates the patient is at intermediate risk for having underlying RONNIE.      HEIDI Oh  7/2/2024

## 2024-07-02 NOTE — ASSESSMENT & PLAN NOTE
"Deteriorated: He reports worsening \"blurred vision and double vision.\"  He has elected to proceed with right cataract repair as above.  "

## 2024-07-02 NOTE — ASSESSMENT & PLAN NOTE
Ongoing:  He reports in 2022 he had allergic reaction to Jardiance which yielded septic shock and ultimately Colostomy and wound up on ventilator for 10 days.  He reports he is followed annually by Dr. Annette CHENG.

## 2024-07-02 NOTE — H&P
"   Preadmission Testing-  Pre-Operative H&P       Patient Name: Samy Elena  Referring Surgeon: Dr Moses    Reason for Referral: Pre-Operative Evaluation  Surgical Procedure: cataract extraction right eye with implant and limbal relaxing incision  Operative Date: 07/18/2024  Other Providers:      PCP: aZchery Hernandez MD   Cardiology: Dr Macias   GI: Dr De Oliveira      HISTORY OF PRESENT ILLNESS      Samy Elena is a 89 y.o. male presenting today to the [facility] at [facility] for pre-operative evaluation prior to planned surgery, cataract extraction right eye with implant and limbal relaxing incision    HPI:  He reports, \"My regular eye doctor retired so it was a matter of finding someone else.  My vision loss is age related.\"  He reports \"blurred vision and double vision\" He denies ocular pain or drainage.  He had cataract repair of Left Eye November 2023 and reports uneventful recovery..  He presents with his adult son today    PMH:  AFIB-->Metoprolol, take as usual the night before surgery  CAD-->MI 2021, medically managed. Plavix, stop 5 days before surgery per prescribing provider  HX TIA (2022)  CHF-->Torsemide, do not take 24H before surgery. Potassium, take as usual the morning of surgery  Anemia-->HGB (10.1) on 05/31/2024. His HGB today (9.6).  Thrombocytopenia-->PLT (101) today  CKD-->GFR (29.0) on 05/31/2024  Hypothyroid-->TSH (0.08) on 05/03/2024. Synthroid, take as usual the morning of surgery with a sip of water  Glaucoma-->Lumigan, take as usual the night before surgery  Depression-->Lexapro, take as usual the morning of surgery with a sip of water  GERD-->Famotidine and Omeprazole,  take as usual the day before surgery  HX UTIS-->Methenamine, take as usual the day before surgery  Cataract (Right)-->planned repair as above  BMI (21)    The patient denies any current or recent chest pain or pressure, dyspnea, cough, sputum, " fevers, chills, abdominal pain, nausea, vomiting, diarrhea or other symptoms.     Functionally, the patient is able to ascend a flight or so of stairs with no dyspnea or chest pain. He can exert to his degree possible without exertional chest pain.  Functional capacity > 4 METS.    The patient denies, on specific questioning, the following:  + history of MI (2021), +valvular disease, +arrhythmia (AFIB), +CHF.  No history of RONNIE.  No history of DVT/PE.  No history of COPD.  No history of CVA.  No history of DM.   + history of CKD.   No history of liver disease or cirrhosis.  No history of bleeding disorder.  No history of difficult airway/difficult intubation.  No history of Malignant hyperthermia.    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)     Coronary artery disease     Disease of thyroid gland     Will catheter in place     6/25 not at present    GI (gastrointestinal bleed)     Hypertension     Myocardial infarction (CMS/HCC)     Orthostatic hypotension     TIA (transient ischemic attack)        Past Surgical History:   Procedure Laterality Date    CATARACT EXTRACTION W/ INTRAOCULAR LENS IMPLANT Left     CHOLECYSTECTOMY      COLOSTOMY      CORONARY ARTERY BYPASS GRAFT      JOINT REPLACEMENT Left     Knee    THYROIDECTOMY         MEDICATIONS        Current Outpatient Medications:     bimatoprost (LUMIGAN) 0.01 % ophthalmic drops, Administer 1 drop into the left eye nightly., Disp: , Rfl:     calcium carbonate (TUMS) 200 mg calcium (500 mg) chewable tablet, Take 1 tablet by mouth 2 (two) times a day., Disp: , Rfl:     clopidogreL (PLAVIX) 75 mg tablet, Take 75 mg by mouth every morning., Disp: , Rfl:     cyanocobalamin (VITAMIN B12) 500 mcg tablet, Take 500 mcg by mouth every  morning., Disp: , Rfl:     escitalopram (LEXAPRO) 5 mg tablet, Take 5 mg by mouth every morning., Disp: , Rfl:     famotidine (PEPCID) 10 mg tablet, Take 1 tablet (10 mg total) by mouth daily Indications: gastroesophageal reflux disease., Disp: 30 tablet, Rfl: 0    lidocaine (ASPERCREME) 4 % adhesive patch,medicated topical patch, Apply 1 patch topically daily as needed. Remove & discard patch within 12 hours or as directed by prescriber., Disp: , Rfl:     loteprednol etabonate (LOTEMAX) 0.5 % ointment, Apply 1 Application to left eye 3 (three) times a day., Disp: , Rfl:     magnesium oxide (MAG-OX) 400 mg (241.3 mg magnesium) tablet, Take 200 mg by mouth 4 (four) times a day. Difficult to swallow at 400 mg, so son found 200 mg. So taking 4 times a day., Disp: , Rfl:     melatonin 3 mg tablet, Take 3 mg by mouth nightly., Disp: , Rfl:     methenamine (HIPREX) 1 gram tablet, Take 1 g by mouth every morning., Disp: , Rfl:     metoprolol succinate XL (TOPROL-XL) 25 mg 24 hr tablet, Take 0.5 tablets (12.5 mg total) by mouth daily. (Patient taking differently: Take 12.5 mg by mouth daily. 5pm), Disp: 15 tablet, Rfl: 0    omeprazole (PriLOSEC) 20 mg capsule, Take 20 mg by mouth every morning., Disp: , Rfl:     potassium chloride (KLOR-CON) 10 mEq CR tablet, Take 20 mEq by mouth 2 (two) times a day., Disp: , Rfl:     SYNTHROID 100 mcg tablet, Take 1 tablet (100 mcg total) by mouth daily. (Patient taking differently: Take 100 mcg by mouth daily.), Disp: 30 tablet, Rfl: 0    thiamine 50 mg tablet, Take 50 mg by mouth daily., Disp: , Rfl:     torsemide (DEMADEX) 10 mg tablet, Take 10 mg by mouth daily before breakfast., Disp: , Rfl:     ALLERGIES      Jardiance [empagliflozin]    FAMILY HISTORY      family history is not on file.    Denies any prior known family history of DVTs/PEs/clotting disorder    SOCIAL HISTORY      Social History     Tobacco Use    Smoking status: Never    Smokeless tobacco: Never   Vaping Use     "Vaping Use: Never used   Substance Use Topics    Alcohol use: Not Currently     Comment: social    Drug use: Never       REVIEW OF SYSTEMS      Constitution: Negative for decreased appetite, diaphoresis, fever, malaise/fatigue, weight gain and weight loss.   HENT: +[place] has HA  Eyes: +\"Blurred vision and double vision\"  Cardiovascular: Negative for chest pain, +dyspnea on exertion, irregular heartbeat, leg swelling, near-syncope, orthopnea, palpitations, paroxysmal nocturnal dyspnea and syncope.   Respiratory: Negative for cough, hemoptysis, shortness of breath, sleep disturbances due to breathing and snoring.    Hematologic/Lymphatic: Does not bruise/bleed easily.   Skin: Negative for rash.   Musculoskeletal: +CLBP, \"I think it's arthritis\" Negative for myalgias.   Gastrointestinal: Negative for heartburn, hematemesis, hematochezia and melena.   Genitourinary: Negative for hematuria and nocturia.   Neurological: Negative for dizziness and light-headedness.   Psychiatric/Behavioral: Negative for altered mental status. The patient does not have insomnia.      All other systems reviewed and negative except as noted in HPI    PHYSICAL EXAMINATION      Visit Vitals  BP (!) 113/51   Pulse 83   Temp 36.6 °C (97.9 °F)   Resp 16   Ht 1.854 m (6' 1\")   Wt 72.6 kg (160 lb)   SpO2 98%   BMI 21.11 kg/m²     Body mass index is 21.11 kg/m².    Physical Exam  Constitutional: Patient is oriented to person, place, and time. The patient appears well-developed and cooperative. No distress.   HENT:   Head: Normocephalic and atraumatic.   Mouth/Throat: Oropharynx is clear and moist. +fair dentition, no dentures  Eyes: Pupils are equal, round, and reactive to light. No scleral icterus. +corrective lenses in place  Neck: Normal range of motion. Neck supple.   Cardiovascular: Normal rate and regular rhythm.   +murmur heard.  Pulmonary/Chest: No accessory muscle usage. No tachypnea. No respiratory distress. No decreased breath sounds, " wheezes, rhonchi, rales.  Abdominal: Soft. Bowel sounds are normal, exhibits no ascites, and no tenderness. +Colostomy  Neurological: Alert and oriented to person, place, and time.   Skin: Skin is warm, dry and intact.   Psychiatric: Has a normal mood and affect. Speech is normal and behavior is normal. Judgment and thought content normal. Cognition and memory are normal.   Nursing note and vitals reviewed.    LABS / EKG        Labs  Lab Results   Component Value Date    WBC 5.10 07/02/2024    HGB 9.6 (L) 07/02/2024    HCT 30.7 (L) 07/02/2024    .7 (H) 07/02/2024     (L) 07/02/2024     Lab Results   Component Value Date    GLUCOSE 104 (H) 05/31/2024    CALCIUM 8.5 (L) 05/31/2024     05/31/2024    K 4.7 05/31/2024    CO2 27 05/31/2024     (H) 05/31/2024    BUN 32 (H) 05/31/2024    CREATININE 2.1 (H) 05/31/2024           ECG/Telemetry  I personally reviewed his EKG of 03/28/2024:AFIB@68BPM w/ known RBBB and T wave abnormality    ECHO: 03/28/2024:  Interpretation Summary  Technically difficult study. EKG demonstrates sinus rhythm.    1. The left ventricular cavity is small in size with normal wall thickness and mildly reduced systolic function. Left ventricular ejection fraction is 45-50% by visual estimate. Low stroke volume. Abnormal septal motion due to RV compression. Diastolic function not assessed on this limited study.   2. The aortic valve is sclerotic and not well visualized, likely three-cusped. No aortic stenosis. No aortic regurgitation.   3. Normal sized aortic root. There is aortic root calcification. Normal sized ascending aorta.    4. Thickened mitral leaflets. Mild mitral annular calcification. Trace mitral regurgitation.    5. The left atrium is normal in size.   6. The right ventricle is severely dilated (indexed volume 106 mL/m^2) and apex forming. Right ventricular systolic function is severely reduced. Apical hypercontractility (Nunes's sign) present.    7. The right  atrium is massively dilated.   8. There is a large coaptation gap of the tricuspid leaflets. Wide-open tricuspid regurgitation with single chamber physiology. Unable to estimated right ventricular pressure due to severity of tricuspid regurgitation.    9. The pulmonic valve is not well visualized. Trace pulmonic regurgitation.   10. The IVC is enlarged with less than 50% respiratory variation consistent with elevated right-sided filling pressures.    11. No pericardial effusion. Large left pleural effusion.   Compared to previous TTE from 8/4/2023, there is no significant change    ASSESSMENT AND PLAN         Diagnoses and all orders for this visit:    Preop examination  Assessment & Plan:  Mr Mcbride presents to preadmission testing today for medical evaluation in advance of his planned right eye cataract repair with Dr. Moses on July 18, 2024.  I personally reviewed his labs and EKG and they are unremarkable unless noted otherwise.  He was instructed to stop taking Plavix per prescribing provider 5-7 days prior to surgery.    The Revised Cardiac Risk Index (RCRI) = 3 for this patient which indicates a 11% risk of major adverse cardiac event in the perioperative period for this low risk procedure.       Ischemic congestive cardiomyopathy (CMS/HCC)  Assessment & Plan:  Ongoing: He is maintained on metoprolol which he was instructed to take as usual the day before surgery.  He denies any exertional chest pain or PND.  He is followed by Dr. Mondragon, cardiology.      Atrial fibrillation, unspecified type (CMS/HCC)  Assessment & Plan:  Ongoing: He reports this is silent for him, he is asymptomatic.  He denies feeling palpitations dizziness chest pain or PND.  He is rate controlled on metoprolol.  Dr. Jeffrey follows.  He was instructed to take metoprolol as usual the day before surgery.      History of chronic CHF  Assessment & Plan:  Stable: He reports his weight has been stable the last 6 months.  He presents  without peripheral edema on exam today.  He is maintained on torsemide which she was instructed not to take the morning of surgery to mitigate against the risk of hypotension.      CKD (chronic kidney disease) stage 4, GFR 15-29 ml/min (CMS/[facility])  Assessment & Plan:  Ongoing: His GFR (29) on May 31, 2024.  Continue to avoid nephrotoxins such as NSAIDs.  Continue to address modifiable below risk factors such as blood pressure.  PCP follows.       Anemia due to stage 4 chronic kidney disease (CMS/[facility])  (CMS/[facility])  Assessment & Plan:  Stable: His hemoglobin has ranged from (9.6) to (10.4) in the last 6 months.  His PCP follows.  Consider oral iron repletion.       Thrombocytopenia (CMS/[facility])  Assessment & Plan:  Deteriorated:  His PLT (101)  today.  His PLT have ranged from (101) to (123) in the last 6 months. PCP follows.       History of transient ischemic attack (TIA)  Assessment & Plan:  Historical: He and his adult son, present for visit, reports this occurred during a complex hospital stay.  Continue to address modifiable risk factors such as blood pressure.  He is maintained on Plavix which she was instructed to stop taking 5 to 7 days prior per his prescribing provider.  He also takes metoprolol which he was instructed to take as usual the day before surgery.  He denies any residual weakness or difficulty with swallowing or word finding.      Gastroesophageal reflux disease, unspecified whether esophagitis present  Assessment & Plan:  Stable: He is maintained on famotidine and omeprazole which he was instructed to take as usual the day before surgery.      Hypothyroidism, unspecified type  Assessment & Plan:  Ongoing: TSH (0.08) on 05/03/2024. He is maintained on Synthroid which he was instructed to take as usual the morning of surgery with a sip of water.  PCP follows.      Depression, unspecified depression type  Assessment & Plan:  Ongoing: He is maintained on Lexapro which he was instructed to take as usual  "the morning of surgery with a sip of water.       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[facility])  Assessment & Plan:  Ongoing:  He reports in 2022 he had allergic reaction to Jardiance which yielded septic shock and ultimately Colostomy and wound up on ventilator for 10 days.  He reports he is followed annually by Dr. Annette CHENG.      Senile cataract of right eye, unspecified age-related cataract type  Assessment & Plan:  Deteriorated: He reports worsening \"blurred vision and double vision.\"  He has elected to proceed with right cataract repair as above.      Glaucoma, unspecified glaucoma type, unspecified laterality  Assessment & Plan:  Ongoing: He is maintained on Lumigan which he was instructed to take as usual the night before surgery.      Ataxia  Assessment & Plan:  Ongoing: He presents today in wheelchair.  He reports he continues with physical therapy.  PCP follows.      History of recurrent UTIs  Assessment & Plan:  Ongoing: He is maintained on methenamine which he was instructed to take as usual the day before surgery.      At risk for obstructive sleep apnea  Assessment & Plan:  STOP BANG Score = 3    Sleep apnea  Sleep apnea places the patient at relatively increased risk (compared to a population without this diagnosis) of oxygen desaturation, cardiac events, acute respiratory failure, or an ICU transfer.  He/She is / is not compliant with CPAP.    In the post op period: recommend use of our sleep apnea protocol in the PACU, pulse ox monitoring, consider extubate to BIPAP.    Reduce narcotic medication dosage as much as possible.          BMI 21.0-21.9, adult  Assessment & Plan:  Stable: PCP follows.            In regards to perioperative cardiac risk:  The patient has +history of ischemic heart disease, has+ history of CHF, denies any history of CVA, is not on pre-operative treatment with insulin, and does have a pre-operative creatinine > 2 mg/d-->(2.1)   The Revised Cardiac Risk Index (RCRI) = 3 for this patient which " indicates a 11% risk of major adverse cardiac event in the perioperative period for this low risk procedure.     Further comments:  Resume supplements when OK with surgical team.  I would encourage incentive spirometry to assist with minimizing kathlene-operative pulmonary risk.  DVT prophylaxis and timing of such per the discretion of the surgeon.     Further comments:  STOP-BANG screening for obstructive sleep apnea = 3, which indicates the patient is at intermediate risk for having underlying RONNIE.      HEIDI Oh  7/2/2024

## 2024-07-02 NOTE — ASSESSMENT & PLAN NOTE
Ongoing: TSH (0.08) on 05/03/2024. He is maintained on Synthroid which he was instructed to take as usual the morning of surgery with a sip of water.  PCP follows.

## 2024-07-17 ENCOUNTER — ANESTHESIA EVENT (OUTPATIENT)
Dept: OPERATING ROOM | Facility: HOSPITAL | Age: 89
Setting detail: HOSPITAL OUTPATIENT SURGERY
End: 2024-07-17
Payer: MEDICARE

## 2024-07-17 RX ORDER — CODEINE SULFATE 15 MG/1
30 TABLET ORAL EVERY 6 HOURS PRN
Status: CANCELLED | OUTPATIENT
Start: 2024-07-18

## 2024-07-17 RX ORDER — ACETAMINOPHEN 325 MG/1
650 TABLET ORAL
Status: CANCELLED | OUTPATIENT
Start: 2024-07-18 | End: 2024-07-18

## 2024-07-17 ASSESSMENT — ENCOUNTER SYMPTOMS: DEPRESSION: 1

## 2024-07-18 ENCOUNTER — HOSPITAL ENCOUNTER (OUTPATIENT)
Facility: HOSPITAL | Age: 89
Setting detail: HOSPITAL OUTPATIENT SURGERY
Discharge: HOME | End: 2024-07-18
Attending: OPHTHALMOLOGY | Admitting: OPHTHALMOLOGY
Payer: MEDICARE

## 2024-07-18 ENCOUNTER — ANESTHESIA (OUTPATIENT)
Dept: OPERATING ROOM | Facility: HOSPITAL | Age: 89
Setting detail: HOSPITAL OUTPATIENT SURGERY
End: 2024-07-18
Payer: MEDICARE

## 2024-07-18 VITALS
OXYGEN SATURATION: 90 % | HEART RATE: 67 BPM | RESPIRATION RATE: 47 BRPM | TEMPERATURE: 98.1 F | DIASTOLIC BLOOD PRESSURE: 56 MMHG | SYSTOLIC BLOOD PRESSURE: 117 MMHG

## 2024-07-18 PROCEDURE — 37000002 HC ANESTHESIA MAC: Performed by: OPHTHALMOLOGY

## 2024-07-18 PROCEDURE — 71000002 HC PACU PHASE 2 INITIAL 30MIN: Performed by: OPHTHALMOLOGY

## 2024-07-18 PROCEDURE — 27200000 HC STERILE SUPPLY: Performed by: OPHTHALMOLOGY

## 2024-07-18 PROCEDURE — 63700000 HC SELF-ADMINISTRABLE DRUG: Performed by: OPHTHALMOLOGY

## 2024-07-18 PROCEDURE — 63600000 HC DRUGS/DETAIL CODE: Mod: JZ | Performed by: NURSE ANESTHETIST, CERTIFIED REGISTERED

## 2024-07-18 PROCEDURE — 71000011 HC PACU PHASE 1 EA ADDL MIN: Performed by: OPHTHALMOLOGY

## 2024-07-18 PROCEDURE — 36000013 HC OR LEVEL 3 EA ADDL MIN: Performed by: OPHTHALMOLOGY

## 2024-07-18 PROCEDURE — V2632 POST CHMBR INTRAOCULAR LENS: HCPCS | Performed by: OPHTHALMOLOGY

## 2024-07-18 PROCEDURE — 25000000 HC PHARMACY GENERAL: Performed by: OPHTHALMOLOGY

## 2024-07-18 PROCEDURE — 63600000 HC DRUGS/DETAIL CODE: Performed by: NURSE ANESTHETIST, CERTIFIED REGISTERED

## 2024-07-18 PROCEDURE — 25000000 HC PHARMACY GENERAL: Performed by: NURSE ANESTHETIST, CERTIFIED REGISTERED

## 2024-07-18 PROCEDURE — 71000001 HC PACU PHASE 1 INITIAL 30MIN: Performed by: OPHTHALMOLOGY

## 2024-07-18 PROCEDURE — 36000003 HC OR LEVEL 3 INITIAL 30MIN: Performed by: OPHTHALMOLOGY

## 2024-07-18 PROCEDURE — 63600000 HC DRUGS/DETAIL CODE: Mod: JZ | Performed by: OPHTHALMOLOGY

## 2024-07-18 PROCEDURE — 08RJ3JZ REPLACEMENT OF RIGHT LENS WITH SYNTHETIC SUBSTITUTE, PERCUTANEOUS APPROACH: ICD-10-PCS | Performed by: OPHTHALMOLOGY

## 2024-07-18 DEVICE — IMPLANTABLE DEVICE: Type: IMPLANTABLE DEVICE | Site: EYE | Status: FUNCTIONAL

## 2024-07-18 RX ORDER — EPHEDRINE SULFATE/0.9% NACL/PF 50 MG/5 ML
SYRINGE (ML) INTRAVENOUS AS NEEDED
Status: DISCONTINUED | OUTPATIENT
Start: 2024-07-18 | End: 2024-07-18 | Stop reason: SURG

## 2024-07-18 RX ORDER — ONDANSETRON HYDROCHLORIDE 2 MG/ML
INJECTION, SOLUTION INTRAVENOUS AS NEEDED
Status: DISCONTINUED | OUTPATIENT
Start: 2024-07-18 | End: 2024-07-18 | Stop reason: SURG

## 2024-07-18 RX ORDER — HYDRALAZINE HYDROCHLORIDE 20 MG/ML
5 INJECTION INTRAMUSCULAR; INTRAVENOUS
Status: DISCONTINUED | OUTPATIENT
Start: 2024-07-18 | End: 2024-07-18 | Stop reason: HOSPADM

## 2024-07-18 RX ORDER — POVIDONE-IODINE 5 %
SOLUTION, NON-ORAL OPHTHALMIC (EYE)
Status: DISCONTINUED | OUTPATIENT
Start: 2024-07-18 | End: 2024-07-18 | Stop reason: HOSPADM

## 2024-07-18 RX ORDER — SODIUM CHLORIDE, SODIUM LACTATE, POTASSIUM CHLORIDE, CALCIUM CHLORIDE 600; 310; 30; 20 MG/100ML; MG/100ML; MG/100ML; MG/100ML
INJECTION, SOLUTION INTRAVENOUS CONTINUOUS
Status: CANCELLED | OUTPATIENT
Start: 2024-07-18 | End: 2024-07-18

## 2024-07-18 RX ORDER — EPINEPHRINE 1 MG/ML
INJECTION, SOLUTION INTRAMUSCULAR; SUBCUTANEOUS
Status: DISCONTINUED | OUTPATIENT
Start: 2024-07-18 | End: 2024-07-18 | Stop reason: HOSPADM

## 2024-07-18 RX ORDER — CYCLOPENTOLAT/TROPIC/PHENYLEPH 1%-1%-2.5%
1 DROPS (EA) OPHTHALMIC (EYE)
Status: DISPENSED | OUTPATIENT
Start: 2024-07-18 | End: 2024-07-18

## 2024-07-18 RX ORDER — TETRACAINE HYDROCHLORIDE 5 MG/ML
SOLUTION OPHTHALMIC
Status: DISCONTINUED | OUTPATIENT
Start: 2024-07-18 | End: 2024-07-18 | Stop reason: HOSPADM

## 2024-07-18 RX ORDER — SODIUM CHLORIDE, SODIUM LACTATE, POTASSIUM CHLORIDE, CALCIUM CHLORIDE 600; 310; 30; 20 MG/100ML; MG/100ML; MG/100ML; MG/100ML
INJECTION, SOLUTION INTRAVENOUS CONTINUOUS
Status: DISCONTINUED | OUTPATIENT
Start: 2024-07-18 | End: 2024-07-18 | Stop reason: HOSPADM

## 2024-07-18 RX ORDER — MIDAZOLAM HYDROCHLORIDE 2 MG/2ML
INJECTION, SOLUTION INTRAMUSCULAR; INTRAVENOUS AS NEEDED
Status: DISCONTINUED | OUTPATIENT
Start: 2024-07-18 | End: 2024-07-18 | Stop reason: SURG

## 2024-07-18 RX ORDER — ACETAZOLAMIDE 250 MG/1
500 TABLET ORAL ONCE
Status: COMPLETED | OUTPATIENT
Start: 2024-07-18 | End: 2024-07-18

## 2024-07-18 RX ORDER — ONDANSETRON HYDROCHLORIDE 2 MG/ML
4 INJECTION, SOLUTION INTRAVENOUS
Status: DISCONTINUED | OUTPATIENT
Start: 2024-07-18 | End: 2024-07-18 | Stop reason: HOSPADM

## 2024-07-18 RX ORDER — FENTANYL CITRATE 50 UG/ML
INJECTION, SOLUTION INTRAMUSCULAR; INTRAVENOUS AS NEEDED
Status: DISCONTINUED | OUTPATIENT
Start: 2024-07-18 | End: 2024-07-18 | Stop reason: SURG

## 2024-07-18 RX ORDER — LIDOCAINE HYDROCHLORIDE 10 MG/ML
INJECTION, SOLUTION EPIDURAL; INFILTRATION; INTRACAUDAL; PERINEURAL AS NEEDED
Status: DISCONTINUED | OUTPATIENT
Start: 2024-07-18 | End: 2024-07-18 | Stop reason: SURG

## 2024-07-18 RX ORDER — CIPROFLOXACIN 250 MG/1
TABLET, FILM COATED ORAL
COMMUNITY
Start: 2024-07-11 | End: 2024-07-28

## 2024-07-18 RX ORDER — FLUMAZENIL 0.1 MG/ML
INJECTION INTRAVENOUS AS NEEDED
Status: DISCONTINUED | OUTPATIENT
Start: 2024-07-18 | End: 2024-07-18 | Stop reason: SURG

## 2024-07-18 RX ORDER — MOXIFLOXACIN 5 MG/ML
SOLUTION/ DROPS OPHTHALMIC
Status: DISCONTINUED | OUTPATIENT
Start: 2024-07-18 | End: 2024-07-18 | Stop reason: HOSPADM

## 2024-07-18 RX ORDER — PROPOFOL 200MG/20ML
SYRINGE (ML) INTRAVENOUS AS NEEDED
Status: DISCONTINUED | OUTPATIENT
Start: 2024-07-18 | End: 2024-07-18 | Stop reason: SURG

## 2024-07-18 RX ORDER — PHENYLEPHRINE HCL IN 0.9% NACL 1 MG/10 ML
SYRINGE (ML) INTRAVENOUS AS NEEDED
Status: DISCONTINUED | OUTPATIENT
Start: 2024-07-18 | End: 2024-07-18 | Stop reason: SURG

## 2024-07-18 RX ADMIN — Medication 10 MG: at 09:34

## 2024-07-18 RX ADMIN — MIDAZOLAM HYDROCHLORIDE 1 MG: 1 INJECTION, SOLUTION INTRAMUSCULAR; INTRAVENOUS at 07:27

## 2024-07-18 RX ADMIN — FENTANYL CITRATE 25 MCG: 50 INJECTION, SOLUTION INTRAMUSCULAR; INTRAVENOUS at 08:41

## 2024-07-18 RX ADMIN — Medication 1 DROP: at 07:05

## 2024-07-18 RX ADMIN — FENTANYL CITRATE 25 MCG: 50 INJECTION, SOLUTION INTRAMUSCULAR; INTRAVENOUS at 07:40

## 2024-07-18 RX ADMIN — FLUMAZENIL 0.2 MG: 0.1 INJECTION, SOLUTION INTRAVENOUS at 07:58

## 2024-07-18 RX ADMIN — LIDOCAINE HYDROCHLORIDE 2 ML: 10 INJECTION, SOLUTION EPIDURAL; INFILTRATION; INTRACAUDAL; PERINEURAL at 08:51

## 2024-07-18 RX ADMIN — Medication 10 MG: at 09:03

## 2024-07-18 RX ADMIN — ACETAZOLAMIDE 500 MG: 250 TABLET ORAL at 06:54

## 2024-07-18 RX ADMIN — PROPOFOL 20 MG: 10 INJECTION, EMULSION INTRAVENOUS at 08:53

## 2024-07-18 RX ADMIN — Medication 1 DROP: at 06:54

## 2024-07-18 RX ADMIN — FENTANYL CITRATE 25 MCG: 50 INJECTION, SOLUTION INTRAMUSCULAR; INTRAVENOUS at 08:02

## 2024-07-18 RX ADMIN — Medication 10 MG: at 09:19

## 2024-07-18 RX ADMIN — Medication 1 DROP: at 07:14

## 2024-07-18 RX ADMIN — FENTANYL CITRATE 25 MCG: 50 INJECTION, SOLUTION INTRAMUSCULAR; INTRAVENOUS at 07:44

## 2024-07-18 RX ADMIN — PROPOFOL 30 MG: 10 INJECTION, EMULSION INTRAVENOUS at 08:51

## 2024-07-18 RX ADMIN — SODIUM CHLORIDE, POTASSIUM CHLORIDE, SODIUM LACTATE AND CALCIUM CHLORIDE: 600; 310; 30; 20 INJECTION, SOLUTION INTRAVENOUS at 07:02

## 2024-07-18 RX ADMIN — Medication 100 MCG: at 08:56

## 2024-07-18 RX ADMIN — FLUMAZENIL 0.2 MG: 0.1 INJECTION, SOLUTION INTRAVENOUS at 08:19

## 2024-07-18 RX ADMIN — FLUMAZENIL 0.2 MG: 0.1 INJECTION, SOLUTION INTRAVENOUS at 08:04

## 2024-07-18 RX ADMIN — FENTANYL CITRATE 25 MCG: 50 INJECTION, SOLUTION INTRAMUSCULAR; INTRAVENOUS at 08:38

## 2024-07-18 RX ADMIN — Medication 200 MCG: at 09:00

## 2024-07-18 RX ADMIN — ONDANSETRON HYDROCHLORIDE 4 MG: 2 INJECTION, SOLUTION INTRAMUSCULAR; INTRAVENOUS at 09:37

## 2024-07-18 NOTE — OR SURGEON
Pre-Procedure patient identification:  I am the primary operating surgeon/proceduralist and I have reviewed the applicable pathology reports and radiology studies for this procedure. I have identified the patient and confirmed laterality is right on 07/18/24 at 7:11 AM Hayder Moses MD  Phone Number: [phone]

## 2024-07-18 NOTE — ANESTHESIOLOGIST PRE-PROCEDURE ATTESTATION
Pre-Procedure Patient Identification:  I am the Primary Anesthesiologist and have identified the patient on 07/18/24 at 7:04 AM.   I have confirmed the procedure(s) will be performed by the following surgeon/proceduralist Hayder Moses MD.

## 2024-07-18 NOTE — ANESTHESIA PROCEDURE NOTES
Airway  Urgency: elective    Start Time: 7/18/2024 8:55 AM  Stop Time: 7/18/2024 9:00 AM      General Information and Staff    Patient location during procedure: OR  Anesthesiologist: Isabel Sandoval MD  Resident/CRNA: Vero Palacios CRNA  Performed: anesthesiologist   Performed by: Vero Palacios CRNA  Authorized by: Isabel Sandoval MD      Indications and Patient Condition  Indications for airway management: anesthesia  Sedation level: general  Preoxygenated: yes  Patient position: sniffing  MILS not maintained throughout  Mask difficulty assessment: 1 - vent by mask    Final Airway Details  Final airway type: supraglottic airway      Successful airway: iGel  Size 4     Number of attempts at approach: 1  Number of other approaches attempted: 0  Atraumatic airway insertion      Additional Comments  Dentition unchanged

## 2024-07-18 NOTE — ANESTHESIA PREPROCEDURE EVALUATION
History  Chief Complaint   Patient presents with    Cold Extremity     pt reports intermittent cold and blue legs and feet, possible side effect of adderal, pt stopped taking it now      This is a 50-year-old female who presents here with complaints of color change of her lower extremity for the last few days intermittently  She reports that when she 1st noted that it happened, she went to sleep and it spontaneously resolved  Currently she is asymptomatic  None       No past medical history on file  No past surgical history on file  No family history on file  I have reviewed and agree with the history as documented  No existing history information found  No existing history information found  Social History     Tobacco Use    Smoking status: Not on file   Substance Use Topics    Alcohol use: Not on file    Drug use: Not on file       Review of Systems   Constitutional: Negative for diaphoresis, fatigue and fever  HENT: Negative for congestion, ear pain, nosebleeds and sore throat  Eyes: Negative for photophobia, pain, discharge and visual disturbance  Respiratory: Negative for cough, choking, chest tightness, shortness of breath and wheezing  Cardiovascular: Negative for chest pain and palpitations  Gastrointestinal: Negative for abdominal distention, abdominal pain, diarrhea and vomiting  Genitourinary: Negative for dysuria, flank pain and frequency  Musculoskeletal: Negative for back pain, gait problem and joint swelling  Skin: Positive for rash  Negative for color change  Neurological: Negative for dizziness, syncope and headaches  Psychiatric/Behavioral: Negative for behavioral problems and confusion  The patient is not nervous/anxious  All other systems reviewed and are negative  Physical Exam  Physical Exam  Vitals signs and nursing note reviewed  Constitutional:       General: She is not in acute distress  Appearance: She is well-developed   She Relevant Problems   CARDIOVASCULAR   (+) Atrial fibrillation (CMS/HCC)   (+) Coronary artery disease   (+) Hypertension   (+) Ischemic congestive cardiomyopathy (CMS/HCC)      GASTROINTESTINAL   (+) GERD (gastroesophageal reflux disease)      HEMATOLOGY   (+) Anemia due to chronic kidney disease   (+) Thrombocytopenia (CMS/HCC)      NEUROLOGY   (+) Depression      Other   (+) Hypothyroidism       Anesthesia ROS/MED HX    Anesthesia History    Previous anesthetics  No history of anesthetic complications  Neuro/Psych    TIA   Depression  Cardiovascular   CAD   hypertension   CHF   cardiomyopathy or myocarditis   ECG reviewed  Comments: Multiple loose teeth, recently loss three teeth  GI/Hepatic   GERD  Renal Disease   acute renal failure   chronic renal insufficiency  Endo/Other   Hypothyroidism  Body Habitus: Normal  ROS/MED HX Comments:    Anesthesia History: Other eye done 6 mo ago, fentanyl only for sedation   Renal Disease: Worsening renal function over the past few months       Patient Active Problem List   Diagnosis    History of transient ischemic attack (TIA)    Hypertension    Ischemic congestive cardiomyopathy (CMS/HCC)    Hyperglycemia    Ataxia    Palliative care by specialist    Debility    Parastomal hernia    Coronary artery disease    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)    Hypertension    Atrial fibrillation (CMS/HCC)    Elevated troponin    Orthostatic hypotension    Hyperlipidemia    Hypothyroidism    Depression    CKD (chronic kidney disease) stage 4, GFR 15-29 ml/min (CMS/HCC)    Pressure ulcer    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)    Multiple medical problems    History of recurrent UTIs    BMI 21.0-21.9, adult    GERD (gastroesophageal reflux disease)    Preop examination    At risk for obstructive sleep apnea    Anemia due to chronic kidney disease    History of chronic CHF    Thrombocytopenia (CMS/HCC)    Glaucoma    Age-related cataract of right eye       Past Medical History:    Diagnosis Date    Aneurysm of internal iliac artery (CMS/[facility])     right    Atrial fibrillation (CMS/HCC)     CHF (congestive heart failure) (CMS/[facility])     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[facility])     Coronary artery disease     Disease of thyroid gland     Will catheter in place     6/25 not at present    GI (gastrointestinal bleed)     Hypertension     Myocardial infarction (CMS/[facility])     Orthostatic hypotension     TIA (transient ischemic attack)      Past Surgical History:   Procedure Laterality Date    CATARACT EXTRACTION W/ INTRAOCULAR LENS IMPLANT Left     CHOLECYSTECTOMY      COLOSTOMY      CORONARY ARTERY BYPASS GRAFT      JOINT REPLACEMENT Left     Knee    THYROIDECTOMY           Prior to Admission medications    Medication Sig Start Date End Date Taking? Authorizing Provider   bimatoprost (LUMIGAN) 0.01 % ophthalmic drops Administer 1 drop into the left eye nightly.    ProviderClay MD   calcium carbonate (TUMS) 200 mg calcium (500 mg) chewable tablet Take 1 tablet by mouth 2 (two) times a day.    ProviderClay MD   clopidogreL (PLAVIX) 75 mg tablet Take 75 mg by mouth every morning.    ProviderClay MD   cyanocobalamin (VITAMIN B12) 500 mcg tablet Take 500 mcg by mouth every morning.    ProviderClay MD   escitalopram (LEXAPRO) 5 mg tablet Take 5 mg by mouth every morning.    ProviderClay MD   famotidine (PEPCID) 10 mg tablet Take 1 tablet (10 mg total) by mouth daily Indications: gastroesophageal reflux disease. 12/8/23 3/26/25  Crystal Alonso,    lidocaine (ASPERCREME) 4 % adhesive patch,medicated topical patch Apply 1 patch topically daily as needed. Remove & discard patch within 12 hours or as directed by prescriber.    ProviderClay MD   loteprednol etabonate (LOTEMAX) 0.5 % ointment Apply 1 Application to left eye 3 (three) times a day.    ProviderClay MD   magnesium oxide (MAG-OX) 400 mg (241.3 mg magnesium) tablet  is not ill-appearing or toxic-appearing  HENT:      Head: Normocephalic and atraumatic  Mouth/Throat:      Dentition: Normal dentition  Eyes:      General:         Right eye: No discharge  Left eye: No discharge  Neck:      Musculoskeletal: Normal range of motion and neck supple  Cardiovascular:      Rate and Rhythm: Normal rate and regular rhythm  Pulmonary:      Effort: Pulmonary effort is normal  No accessory muscle usage or respiratory distress  Abdominal:      General: There is no distension  Tenderness: There is no guarding  Musculoskeletal: Normal range of motion  Skin:     General: Skin is warm and dry  Findings: Rash present  Neurological:      Mental Status: She is alert and oriented to person, place, and time  Coordination: Coordination normal    Psychiatric:         Behavior: Behavior is cooperative  Vital Signs  ED Triage Vitals [03/25/21 1702]   Temperature Pulse Respirations Blood Pressure SpO2   97 9 °F (36 6 °C) 99 16 138/83 98 %      Temp Source Heart Rate Source Patient Position - Orthostatic VS BP Location FiO2 (%)   Temporal Monitor Sitting Left arm --      Pain Score       --           Vitals:    03/25/21 1702   BP: 138/83   Pulse: 99   Patient Position - Orthostatic VS: Sitting         Visual Acuity      ED Medications  Medications - No data to display    Diagnostic Studies  Results Reviewed     None                 No orders to display              Procedures  Procedures         ED Course                             SBIRT 20yo+      Most Recent Value   SBIRT (22 yo +)   In order to provide better care to our patients, we are screening all of our patients for alcohol and drug use  Would it be okay to ask you these screening questions? Yes Filed at: 03/25/2021 1902   Initial Alcohol Screen: US AUDIT-C    1  How often do you have a drink containing alcohol?  0 Filed at: 03/25/2021 1902   2   How many drinks containing alcohol do you have on a Take 200 mg by mouth 4 (four) times a day. Difficult to swallow at 400 mg, so son found 200 mg. So taking 4 times a day.    Caly Chambers MD   melatonin 3 mg tablet Take 3 mg by mouth nightly.    Clay Chambers MD   methenamine (HIPREX) 1 gram tablet Take 1 g by mouth every morning.    Clay Chambers MD   metoprolol succinate XL (TOPROL-XL) 25 mg 24 hr tablet Take 0.5 tablets (12.5 mg total) by mouth daily.  Patient taking differently: Take 12.5 mg by mouth daily. 5pm 3/29/24 6/25/24  Abdi Moses MD   omeprazole (PriLOSEC) 20 mg capsule Take 20 mg by mouth every morning. 5/25/24   Clay Chambers MD   potassium chloride (KLOR-CON) 10 mEq CR tablet Take 20 mEq by mouth 2 (two) times a day.    Clay Chambers MD   SYNTHROID 100 mcg tablet Take 1 tablet (100 mcg total) by mouth daily.  Patient taking differently: Take 100 mcg by mouth daily. 3/29/24 6/25/24  Abdi Moses MD   thiamine 50 mg tablet Take 50 mg by mouth daily.    Clay Chambers MD   torsemide (DEMADEX) 10 mg tablet Take 10 mg by mouth daily before breakfast.    Clay Chambers MD   amLODIPine (NORVASC) 2.5 mg tablet amlodipine 2.5 mg tablet  7/22/22  Jhon Chambers MD   apixaban (ELIQUIS) 2.5 mg tablet   7/22/22  John Chambers MD   hydrochlorothiazide (MICROZIDE) 12.5 mg capsule hydrochlorothiazide 12.5 mg capsule  7/22/22  John Chambers MD       CBC Results         07/02/24 05/31/24 03/30/24     1123 0924 0459    WBC 5.10 4.28 5.00    RBC 2.99 3.15 3.13    HGB 9.6 10.1 10.3    HCT 30.7 32.0 31.7    .7 101.6 101.3    MCH 32.1 32.1 32.9    MCHC 31.3 31.6 32.5     110 123            BMP Results         05/31/24 05/03/24 03/29/24     0924 0900 1246     142 139    K 4.7 4.1 3.3    Cl 108 108 105    CO2 27 26 27    Glucose 104 158 99    BUN 32 33 30    Creatinine 2.1 1.7 1.4    Calcium 8.5 8.6 8.1    Anion Gap 8 8 7    EGFR 29.0 37.4  typical day you are drinking? 0 Filed at: 03/25/2021 1902   3b  FEMALE Any Age, or MALE 65+: How often do you have 4 or more drinks on one occassion? 0 Filed at: 03/25/2021 1902   Audit-C Score  0 Filed at: 03/25/2021 1902   SOHAM: How many times in the past year have you    Used an illegal drug or used a prescription medication for non-medical reasons? Never Filed at: 03/25/2021 1902                    [facility]  Number of Diagnoses or Management Options  Mottled skin: new and requires workup  Diagnosis management comments: This is most likely a benign process, likely related to temperature change and/or vaso spasm  Nothing that would suggest an ischemic limb  Recommend if this continues that she should follow-up with her primary care  Amount and/or Complexity of Data Reviewed  Independent visualization of images, tracings, or specimens: yes    Patient Progress  Patient progress: stable      Disposition  Final diagnoses: Mottled skin - Intermittent     Time reflects when diagnosis was documented in both MDM as applicable and the Disposition within this note     Time User Action Codes Description Comment    3/25/2021  6:48 PM Priti Belts Add [R23 1] Livedo reticularis     3/25/2021  6:48 PM Priti Belts Add [L81 9] Mottled skin     3/25/2021  6:49 PM Priti Belts Remove [L81 9] Mottled skin     3/25/2021  6:49 PM Priti Belts Remove [R23 1] Livedo reticularis     3/25/2021  6:49 PM Michelle Lie [L81 9] Mottled skin     3/25/2021  6:49 PM Erica Harps [L81 9] Mottled skin Intermittent    3/25/2021  6:53 PM Priti Belts Add [I73 9] Vasodilation ([address] )     3/25/2021  6:54 PM Priti Belts Remove [I73 9] Vasodilation [facility])       ED Disposition     ED Disposition Condition Date/Time Comment    Discharge Stable Thu Mar 25, 2021  6:50 PM Steve Ready discharge to home/self care              Follow-up Information     Follow up With Specialties Details Why Contact Info Additional Information    Dameon 47.2           Comment for EGFR at 0924 on 05/31/24: Calculation based on the Chronic Kidney Disease Epidemiology Collaboration (CKD-EPI) equation refit without adjustment for race.    Comment for EGFR at 0900 on 05/03/24: Calculation based on the Chronic Kidney Disease Epidemiology Collaboration (CKD-EPI) equation refit without adjustment for race.    Comment for EGFR at 1246 on 03/29/24: Calculation based on the Chronic Kidney Disease Epidemiology Collaboration (CKD-EPI) equation refit without adjustment for race.          3/28/24 tte  Technically difficult study. EKG demonstrates sinus rhythm.    1. The left ventricular cavity is small in size with normal wall thickness and mildly reduced systolic function. Left ventricular ejection fraction is 45-50% by visual estimate. Low stroke volume. Abnormal septal motion due to RV compression. Diastolic function not assessed on this limited study.   2. The aortic valve is sclerotic and not well visualized, likely three-cusped. No aortic stenosis. No aortic regurgitation.   3. Normal sized aortic root. There is aortic root calcification. Normal sized ascending aorta.    4. Thickened mitral leaflets. Mild mitral annular calcification. Trace mitral regurgitation.    5. The left atrium is normal in size.   6. The right ventricle is severely dilated (indexed volume 106 mL/m^2) and apex forming. Right ventricular systolic function is severely reduced. Apical hypercontractility (Nunes's sign) present.    7. The right atrium is massively dilated.   8. There is a large coaptation gap of the tricuspid leaflets. Wide-open tricuspid regurgitation with single chamber physiology. Unable to estimated right ventricular pressure due to severity of tricuspid regurgitation.    9. The pulmonic valve is not well visualized. Trace pulmonic regurgitation.   10. The IVC is enlarged with less than 50% respiratory variation consistent with elevated right-sided filling pressures.    11. No  Merrill Mcadams MD Internal Medicine Schedule an appointment as soon as possible for a visit  To establish a primary care provider [address] [address]  [address] [facility] Dermatology Schedule an appointment as soon as possible for a visit  For Continued Evaluation [address] [id number] [phone] [facility], [place], [place], [address]          There are no discharge medications for this patient  No discharge procedures on file      PDMP Review     None          ED Provider  Electronically Signed by           ANISA Obregon  03/25/21 122 [address] [place] [address],  [address], [address]  03/25/21 1948 pericardial effusion. Large left pleural effusion.   Compared to previous TTE from 8/4/2023, there is no significant change  Physical Exam    Airway   Mallampati: III   TM distance: >3 FB   Neck ROM: full  Cardiovascular - normal   Rhythm: irregular   Rate: normalPulmonary - normal   clear to auscultation  Other Findings   Multiple loose teeth, recently loss three teeth  Dental    Teeth Problems: loose        Anesthesia Plan    Plan: MAC    Technique: MAC     Lines and Monitors: PIV     Airway: natural airway / supplemental oxygen    4 ASA  Blood Products:   Use of Blood Products Discussed: No   Anesthetic plan and risks discussed with: patient and adult children  Postop Plan:   Patient Disposition: phase II then home   Pain Management: IV analgesics  Comments:    Plan: Patient was here 6 mo ago, can repeat anesthetic since did well

## 2024-07-18 NOTE — ANESTHESIA POSTPROCEDURE EVALUATION
Patient: Samy Elena    Procedure Summary       Date: 07/18/24 Room / Location:  [facility] OR 02 / [facility] OR [facility]    Anesthesia Start: 0728 Anesthesia Stop: 1009    Procedure: cataract extraction right eye with implant and limbal relaxing incision (Right) Diagnosis:       Cortical age-related cataract of right eye      Regular astigmatism of right eye      (cataract right eye)      (astigmatisam right eye)    Surgeons: Hayder Moses MD Responsible Provider: Isabel Sandoval MD    Anesthesia Type: MAC ASA Status: 4            Anesthesia Type: MAC  PACU Vitals  7/18/2024 1002 - 7/18/2024 1102        7/18/2024  1010 7/18/2024  1015 7/18/2024  1020 7/18/2024  1030    BP: 118/59 -- 130/69 111/65    Pulse: 74 -- 76 66    Resp: 12 -- 25 30    SpO2: -- 92 % -- 97 %                7/18/2024  1045 7/18/2024  1050 7/18/2024  1100       BP: 126/58 -- 116/58     Pulse: 73 73 71     Resp: 15 19 13     SpO2: 97 % -- 98 %                   Anesthesia Post Evaluation    Pain management: adequate  Mode of pain management: IV medication  Patient location during evaluation: PACU  Patient participation: complete - patient participated  Level of consciousness: awake  Cardiovascular status: acceptable  Airway Patency: adequate  Respiratory status: acceptable  Hydration status: acceptable  Anesthetic complications: no

## 2024-07-18 NOTE — OP NOTE
POST-OP note    Preoperative diagnosis: Mature cataract right eye and corneal astigmatism right eye  Postoperative diagnosis: Same  Procedure: Phacoemulsification with insertion of posterior chamber implant clear cornea no stitch, and limbal relaxing incision right eye    Anesthesia: topical  Surgeon: Hayder Moses Jr., M.D. FACS  Assistant: None      The risks benefits and alternatives of surgery were discussed with the patient and family in detail including loss of vision, infection, ptosis, retinal detachment, hemorrhage, pupil abnormalities. He appeared to understand and wished to proceed    In the preop room the a drop of tetracaine was instilled into the right eye.  The 180° axis was marked at the limbus with a marking pen.     The patient was brought to the operating room in stable condition. The eyes were prepped in a sterile fashion using Betadine normal saline paper drapes and tape. 4 drops of tetracaine were instilled in the right eye. A Andrew retractor was placed for lid retraction.  After a Weck-Keyla sponge  was used to dry the cornea, the 180° axis was reinforced with a Sinskey hook with ink.  The 180 degree axis was marked using a double pronged marker and Godfrey ring.  A 35 degree arc was marked.  A 600 micronsquare. vishnu blade was used to make a corneal relaxing incision at the 8 mm clear zone centered at the 180 degree axis.    Next, a 3 mm scored blade was used to make a paracentesis tract at 2:00. The full blade was used to make a 3 staged self sealing incision at 930 o'clock. Amvisc was placed in the anterior chamber. A cystotome handle was used to make a continuous tear anterior capsulotomy. The nucleus was hydrodelineated and hydrodissected and phacoemulsified using a total accumulated phaco time of 6.5 minutes. The remaining cortex was aspirated. Amvisc was placed in the anterior chamber. A posterior chamber implant model LI 6 1 AO power +12.5  diopters was placed in the capsular bag and the haptics were rotated to the 3 and 9:00.    The remaining Amvisc was aspirated. BSS and a small air bubble or placed in the anterior chamber to ensure wound closure. The wound was tested and found to be watertight. A collagen shield soaked in moxifloxacin was placed over the cornea. A light patch and a Robb shield were applied.    The patient tolerated the procedure well left the operating room in stable condition and was discharged home.

## 2024-07-28 ENCOUNTER — APPOINTMENT (EMERGENCY)
Dept: RADIOLOGY | Facility: HOSPITAL | Age: 89
DRG: 689 | End: 2024-07-28
Payer: MEDICARE

## 2024-07-28 ENCOUNTER — APPOINTMENT (INPATIENT)
Dept: RADIOLOGY | Facility: HOSPITAL | Age: 89
DRG: 689 | End: 2024-07-28
Payer: MEDICARE

## 2024-07-28 ENCOUNTER — HOSPITAL ENCOUNTER (INPATIENT)
Facility: HOSPITAL | Age: 89
LOS: 4 days | Discharge: HOME HEALTH | DRG: 689 | End: 2024-08-01
Attending: EMERGENCY MEDICINE | Admitting: HOSPITALIST
Payer: MEDICARE

## 2024-07-28 DIAGNOSIS — N39.0 LOWER URINARY TRACT INFECTION: Primary | ICD-10-CM

## 2024-07-28 DIAGNOSIS — I10 HYPERTENSION, UNSPECIFIED TYPE: ICD-10-CM

## 2024-07-28 DIAGNOSIS — I48.20 CHRONIC ATRIAL FIBRILLATION (CMS/HCC): ICD-10-CM

## 2024-07-28 DIAGNOSIS — G93.40 ACUTE ENCEPHALOPATHY: ICD-10-CM

## 2024-07-28 DIAGNOSIS — I50.9 ACUTE ON CHRONIC CONGESTIVE HEART FAILURE, UNSPECIFIED HEART FAILURE TYPE (CMS/HCC): ICD-10-CM

## 2024-07-28 DIAGNOSIS — I48.91 ATRIAL FIBRILLATION, UNSPECIFIED TYPE (CMS/HCC): ICD-10-CM

## 2024-07-28 DIAGNOSIS — J90 PLEURAL EFFUSION ON LEFT: ICD-10-CM

## 2024-07-28 PROBLEM — I50.20 HFREF (HEART FAILURE WITH REDUCED EJECTION FRACTION) (CMS/HCC): Status: ACTIVE | Noted: 2024-07-28

## 2024-07-28 PROBLEM — N17.9 AKI (ACUTE KIDNEY INJURY) (CMS/HCC): Status: ACTIVE | Noted: 2024-07-28

## 2024-07-28 PROBLEM — Z87.440 HISTORY OF RECURRENT UTIS: Status: ACTIVE | Noted: 2024-07-28

## 2024-07-28 PROBLEM — R06.02 SHORTNESS OF BREATH: Status: ACTIVE | Noted: 2024-07-28

## 2024-07-28 LAB
ALBUMIN SERPL-MCNC: 3.7 G/DL (ref 3.5–5.7)
ALP SERPL-CCNC: 89 IU/L (ref 34–125)
ALT SERPL-CCNC: 7 IU/L (ref 7–52)
ANION GAP SERPL CALC-SCNC: 10 MEQ/L (ref 3–15)
AST SERPL-CCNC: 42 IU/L (ref 13–39)
BACTERIA URNS QL MICRO: ABNORMAL /HPF
BASE EXCESS BLDV CALC-SCNC: -1.3 MEQ/L
BASOPHILS # BLD: 0.05 K/UL (ref 0.01–0.1)
BASOPHILS NFR BLD: 0.9 %
BILIRUB SERPL-MCNC: 1.2 MG/DL (ref 0.3–1.2)
BILIRUB UR QL STRIP.AUTO: NEGATIVE MG/DL
BNP SERPL-MCNC: 740 PG/ML
BUN SERPL-MCNC: 29 MG/DL (ref 7–25)
CALCIUM SERPL-MCNC: 9.4 MG/DL (ref 8.6–10.3)
CHLORIDE SERPL-SCNC: 106 MEQ/L (ref 98–107)
CLARITY UR REFRACT.AUTO: CLEAR
CO2 BLDV-SCNC: 24.9 MEQ/L (ref 22–32)
CO2 SERPL-SCNC: 22 MEQ/L (ref 21–31)
COLOR UR AUTO: YELLOW
CREAT SERPL-MCNC: 2.5 MG/DL (ref 0.7–1.3)
DIFFERENTIAL METHOD BLD: ABNORMAL
EGFRCR SERPLBLD CKD-EPI 2021: 23.5 ML/MIN/1.73M*2
EOSINOPHIL # BLD: 0.1 K/UL (ref 0.04–0.54)
EOSINOPHIL NFR BLD: 1.8 %
ERYTHROCYTE [DISTWIDTH] IN BLOOD BY AUTOMATED COUNT: 18.9 % (ref 11.6–14.4)
FIO2 ON VENT: 21 %
FLUAV RNA SPEC QL NAA+PROBE: NEGATIVE
FLUBV RNA SPEC QL NAA+PROBE: NEGATIVE
GLUCOSE SERPL-MCNC: 116 MG/DL (ref 70–99)
GLUCOSE UR STRIP.AUTO-MCNC: NEGATIVE MG/DL
HCO3 BLDV-SCNC: 22.1 MEQ/L (ref 21–28)
HCT VFR BLD AUTO: 34.8 % (ref 40.1–51)
HGB BLD-MCNC: 11 G/DL (ref 13.7–17.5)
HGB UR QL STRIP.AUTO: NEGATIVE
HYALINE CASTS #/AREA URNS LPF: ABNORMAL /LPF
IMM GRANULOCYTES # BLD AUTO: 0.01 K/UL (ref 0–0.08)
IMM GRANULOCYTES NFR BLD AUTO: 0.2 %
INHALED O2 CONCENTRATION: ABNORMAL %
KETONES UR STRIP.AUTO-MCNC: NEGATIVE MG/DL
LACTATE SERPL-SCNC: 1.9 MMOL/L (ref 0.4–2)
LACTATE SERPL-SCNC: 2.1 MMOL/L (ref 0.4–2)
LEUKOCYTE ESTERASE UR QL STRIP.AUTO: 1
LYMPHOCYTES # BLD: 0.7 K/UL (ref 1.2–3.5)
LYMPHOCYTES NFR BLD: 12.3 %
MAGNESIUM SERPL-MCNC: 2.4 MG/DL (ref 1.8–2.5)
MCH RBC QN AUTO: 31.7 PG (ref 28–33.2)
MCHC RBC AUTO-ENTMCNC: 31.6 G/DL (ref 32.2–36.5)
MCV RBC AUTO: 100.3 FL (ref 83–98)
MONOCYTES # BLD: 0.61 K/UL (ref 0.3–1)
MONOCYTES NFR BLD: 10.7 %
MUCOUS THREADS URNS QL MICRO: ABNORMAL /LPF
NEUTROPHILS # BLD: 4.21 K/UL (ref 1.7–7)
NEUTS SEG NFR BLD: 74.1 %
NITRITE UR QL STRIP.AUTO: NEGATIVE
NRBC BLD-RTO: 0 %
PCO2 BLDV: 40 MM HG (ref 41–51)
PDW BLD AUTO: 10.6 FL (ref 9.4–12.4)
PH BLDV: 7.38 [PH] (ref 7.32–7.42)
PH UR STRIP.AUTO: 7 [PH]
PLATELET # BLD AUTO: 125 K/UL (ref 150–350)
PO2 BLDV: 20 MM HG (ref 25–40)
POTASSIUM SERPL-SCNC: 5.2 MEQ/L (ref 3.5–5.1)
PROT SERPL-MCNC: 9.3 G/DL (ref 6–8.2)
PROT UR QL STRIP.AUTO: NEGATIVE
RBC # BLD AUTO: 3.47 M/UL (ref 4.5–5.8)
RBC #/AREA URNS HPF: ABNORMAL /HPF
RSV RNA SPEC QL NAA+PROBE: NEGATIVE
SARS-COV-2 RNA RESP QL NAA+PROBE: NEGATIVE
SODIUM SERPL-SCNC: 138 MEQ/L (ref 136–145)
SP GR UR REFRACT.AUTO: 1.01
SQUAMOUS URNS QL MICRO: ABNORMAL /HPF
T4 FREE SERPL-MCNC: 1.08 NG/DL (ref 0.58–1.64)
TROPONIN I SERPL HS-MCNC: 22.9 PG/ML
TROPONIN I SERPL HS-MCNC: 23.2 PG/ML
TSH SERPL DL<=0.05 MIU/L-ACNC: 6.26 MIU/L (ref 0.34–5.6)
UROBILINOGEN UR STRIP-ACNC: 0.2 EU/DL
WBC # BLD AUTO: 5.68 K/UL (ref 3.8–10.5)
WBC #/AREA URNS HPF: ABNORMAL /HPF

## 2024-07-28 PROCEDURE — 93005 ELECTROCARDIOGRAM TRACING: CPT

## 2024-07-28 PROCEDURE — 21400000 HC ROOM AND CARE CCU/INTERMEDIATE

## 2024-07-28 PROCEDURE — 74176 CT ABD & PELVIS W/O CONTRAST: CPT

## 2024-07-28 PROCEDURE — 63600000 HC DRUGS/DETAIL CODE

## 2024-07-28 PROCEDURE — 70450 CT HEAD/BRAIN W/O DYE: CPT

## 2024-07-28 PROCEDURE — 82803 BLOOD GASES ANY COMBINATION: CPT

## 2024-07-28 PROCEDURE — 12000000 HC ROOM AND CARE MED/SURG

## 2024-07-28 PROCEDURE — 84484 ASSAY OF TROPONIN QUANT: CPT | Performed by: EMERGENCY MEDICINE

## 2024-07-28 PROCEDURE — 96375 TX/PRO/DX INJ NEW DRUG ADDON: CPT

## 2024-07-28 PROCEDURE — 84439 ASSAY OF FREE THYROXINE: CPT

## 2024-07-28 PROCEDURE — 83605 ASSAY OF LACTIC ACID: CPT | Performed by: EMERGENCY MEDICINE

## 2024-07-28 PROCEDURE — 83880 ASSAY OF NATRIURETIC PEPTIDE: CPT

## 2024-07-28 PROCEDURE — 71045 X-RAY EXAM CHEST 1 VIEW: CPT

## 2024-07-28 PROCEDURE — 83735 ASSAY OF MAGNESIUM: CPT

## 2024-07-28 PROCEDURE — 85025 COMPLETE CBC W/AUTO DIFF WBC: CPT

## 2024-07-28 PROCEDURE — 96374 THER/PROPH/DIAG INJ IV PUSH: CPT

## 2024-07-28 PROCEDURE — 83605 ASSAY OF LACTIC ACID: CPT

## 2024-07-28 PROCEDURE — 80053 COMPREHEN METABOLIC PANEL: CPT | Performed by: EMERGENCY MEDICINE

## 2024-07-28 PROCEDURE — 87637 SARSCOV2&INF A&B&RSV AMP PRB: CPT

## 2024-07-28 PROCEDURE — 99223 1ST HOSP IP/OBS HIGH 75: CPT | Performed by: HOSPITALIST

## 2024-07-28 PROCEDURE — 85025 COMPLETE CBC W/AUTO DIFF WBC: CPT | Performed by: EMERGENCY MEDICINE

## 2024-07-28 PROCEDURE — 25800000 HC PHARMACY IV SOLUTIONS

## 2024-07-28 PROCEDURE — 36415 COLL VENOUS BLD VENIPUNCTURE: CPT

## 2024-07-28 PROCEDURE — 63700000 HC SELF-ADMINISTRABLE DRUG

## 2024-07-28 PROCEDURE — 81001 URINALYSIS AUTO W/SCOPE: CPT

## 2024-07-28 PROCEDURE — 93005 ELECTROCARDIOGRAM TRACING: CPT | Performed by: EMERGENCY MEDICINE

## 2024-07-28 PROCEDURE — 87086 URINE CULTURE/COLONY COUNT: CPT

## 2024-07-28 PROCEDURE — 99285 EMERGENCY DEPT VISIT HI MDM: CPT | Mod: 25

## 2024-07-28 PROCEDURE — 84443 ASSAY THYROID STIM HORMONE: CPT

## 2024-07-28 RX ORDER — LEVOTHYROXINE SODIUM 100 UG/1
100 TABLET ORAL
COMMUNITY

## 2024-07-28 RX ORDER — DEXTROSE 50 % IN WATER (D50W) INTRAVENOUS SYRINGE
25 AS NEEDED
Status: DISCONTINUED | OUTPATIENT
Start: 2024-07-28 | End: 2024-08-01 | Stop reason: HOSPADM

## 2024-07-28 RX ORDER — BROMFENAC SODIUM 0.7 MG/ML
1 SOLUTION/ DROPS OPHTHALMIC DAILY
COMMUNITY
End: 2024-09-24 | Stop reason: ALTCHOICE

## 2024-07-28 RX ORDER — GENTAMICIN SULFATE 3 MG/ML
1 SOLUTION/ DROPS OPHTHALMIC EVERY 8 HOURS
Status: DISCONTINUED | OUTPATIENT
Start: 2024-07-28 | End: 2024-08-01 | Stop reason: HOSPADM

## 2024-07-28 RX ORDER — PNV NO.95/FERROUS FUM/FOLIC AC 28MG-0.8MG
500 TABLET ORAL EVERY MORNING
Status: DISCONTINUED | OUTPATIENT
Start: 2024-07-28 | End: 2024-08-01 | Stop reason: HOSPADM

## 2024-07-28 RX ORDER — ESCITALOPRAM OXALATE 5 MG/1
5 TABLET ORAL EVERY MORNING
Status: DISCONTINUED | OUTPATIENT
Start: 2024-07-28 | End: 2024-08-01 | Stop reason: HOSPADM

## 2024-07-28 RX ORDER — DEXTROSE 40 %
15-30 GEL (GRAM) ORAL AS NEEDED
Status: DISCONTINUED | OUTPATIENT
Start: 2024-07-28 | End: 2024-08-01 | Stop reason: HOSPADM

## 2024-07-28 RX ORDER — TOBRAMYCIN 3 MG/ML
1 SOLUTION/ DROPS OPHTHALMIC 3 TIMES DAILY
COMMUNITY
End: 2024-10-15

## 2024-07-28 RX ORDER — HEPARIN SODIUM 5000 [USP'U]/ML
5000 INJECTION, SOLUTION INTRAVENOUS; SUBCUTANEOUS EVERY 8 HOURS
Status: DISCONTINUED | OUTPATIENT
Start: 2024-07-28 | End: 2024-08-01 | Stop reason: HOSPADM

## 2024-07-28 RX ORDER — CALCIUM CARBONATE 200(500)MG
500 TABLET,CHEWABLE ORAL 3 TIMES DAILY
Status: DISCONTINUED | OUTPATIENT
Start: 2024-07-28 | End: 2024-07-30 | Stop reason: SDUPTHER

## 2024-07-28 RX ORDER — LATANOPROST 50 UG/ML
1 SOLUTION/ DROPS OPHTHALMIC NIGHTLY
Status: DISCONTINUED | OUTPATIENT
Start: 2024-07-28 | End: 2024-07-30

## 2024-07-28 RX ORDER — CYANOCOBALAMIN (VITAMIN B-12) 500 MCG
3 TABLET ORAL NIGHTLY
Status: DISCONTINUED | OUTPATIENT
Start: 2024-07-28 | End: 2024-08-01 | Stop reason: HOSPADM

## 2024-07-28 RX ORDER — FAMOTIDINE 10 MG/1
10 TABLET ORAL DAILY
Status: DISCONTINUED | OUTPATIENT
Start: 2024-07-28 | End: 2024-08-01 | Stop reason: HOSPADM

## 2024-07-28 RX ORDER — TORSEMIDE 20 MG/1
20 TABLET ORAL 2 TIMES DAILY
Status: DISCONTINUED | OUTPATIENT
Start: 2024-07-28 | End: 2024-08-01 | Stop reason: HOSPADM

## 2024-07-28 RX ORDER — IBUPROFEN 200 MG
16-32 TABLET ORAL AS NEEDED
Status: DISCONTINUED | OUTPATIENT
Start: 2024-07-28 | End: 2024-08-01 | Stop reason: HOSPADM

## 2024-07-28 RX ORDER — METOPROLOL SUCCINATE 25 MG/1
12.5 TABLET, EXTENDED RELEASE ORAL
COMMUNITY
End: 2025-03-11 | Stop reason: ALTCHOICE

## 2024-07-28 RX ORDER — METHENAMINE HIPPURATE 1000 MG/1
1 TABLET ORAL
Status: DISCONTINUED | OUTPATIENT
Start: 2024-07-29 | End: 2024-08-01 | Stop reason: HOSPADM

## 2024-07-28 RX ORDER — METOPROLOL SUCCINATE 25 MG/1
25 TABLET, EXTENDED RELEASE ORAL
Status: DISCONTINUED | OUTPATIENT
Start: 2024-07-28 | End: 2024-08-01 | Stop reason: HOSPADM

## 2024-07-28 RX ORDER — LEVOTHYROXINE SODIUM 100 UG/1
100 TABLET ORAL
Status: DISCONTINUED | OUTPATIENT
Start: 2024-07-29 | End: 2024-08-01 | Stop reason: HOSPADM

## 2024-07-28 RX ORDER — FUROSEMIDE 10 MG/ML
60 INJECTION INTRAMUSCULAR; INTRAVENOUS ONCE
Status: COMPLETED | OUTPATIENT
Start: 2024-07-28 | End: 2024-07-28

## 2024-07-28 RX ORDER — THIAMINE HCL 100 MG
50 TABLET ORAL DAILY
Status: DISCONTINUED | OUTPATIENT
Start: 2024-07-28 | End: 2024-08-01 | Stop reason: HOSPADM

## 2024-07-28 RX ORDER — CLOPIDOGREL BISULFATE 75 MG/1
75 TABLET ORAL EVERY MORNING
Status: DISCONTINUED | OUTPATIENT
Start: 2024-07-28 | End: 2024-08-01 | Stop reason: HOSPADM

## 2024-07-28 RX ORDER — HEPARIN SODIUM 5000 [USP'U]/ML
5000 INJECTION, SOLUTION INTRAVENOUS; SUBCUTANEOUS EVERY 8 HOURS
Status: DISCONTINUED | OUTPATIENT
Start: 2024-07-28 | End: 2024-07-29

## 2024-07-28 RX ORDER — PANTOPRAZOLE SODIUM 20 MG/1
20 TABLET, DELAYED RELEASE ORAL DAILY
Status: DISCONTINUED | OUTPATIENT
Start: 2024-07-28 | End: 2024-08-01 | Stop reason: HOSPADM

## 2024-07-28 RX ORDER — LOTEPREDNOL ETABONATE 5 MG/ML
1 SUSPENSION/ DROPS OPHTHALMIC 4 TIMES DAILY
COMMUNITY
End: 2024-09-24 | Stop reason: ALTCHOICE

## 2024-07-28 RX ADMIN — FAMOTIDINE 10 MG: 20 TABLET, FILM COATED ORAL at 22:59

## 2024-07-28 RX ADMIN — THIAMINE HCL TAB 100 MG 50 MG: 100 TAB at 22:59

## 2024-07-28 RX ADMIN — FUROSEMIDE 60 MG: 10 INJECTION, SOLUTION INTRAMUSCULAR; INTRAVENOUS at 15:50

## 2024-07-28 RX ADMIN — ESCITALOPRAM OXALATE 5 MG: 5 TABLET, FILM COATED ORAL at 22:58

## 2024-07-28 RX ADMIN — CLOPIDOGREL 75 MG: 75 TABLET ORAL at 22:59

## 2024-07-28 RX ADMIN — CEFTAZIDIME 1 G: 1 INJECTION, POWDER, FOR SOLUTION INTRAMUSCULAR; INTRAVENOUS at 15:52

## 2024-07-28 RX ADMIN — PANTOPRAZOLE SODIUM 20 MG: 20 TABLET, DELAYED RELEASE ORAL at 22:58

## 2024-07-28 RX ADMIN — HEPARIN SODIUM 5000 UNITS: 5000 INJECTION, SOLUTION INTRAVENOUS; SUBCUTANEOUS at 22:59

## 2024-07-28 RX ADMIN — Medication 3 MG: at 22:58

## 2024-07-28 RX ADMIN — Medication 500 MCG: at 22:59

## 2024-07-28 NOTE — ASSESSMENT & PLAN NOTE
History of frequent UTIs as reported by son  No reported history of renal calculi or BPH however  UA with mild pyuria 35-50 WBC, no bacteria.  No ketones, protein, blood +1 LE.  CT from 08/2023 showed Multiple small nonobstructing right renal calculi  CT from 10/2022 showed prostatic megaly  Presenting today with AMS similar to prior episodes of UTIs    Concern for recurrent UTI unresolved with OP treatment with Bactrim and Cipro.  UA may be partially sterilized now after outpatient ABX.    - Concern for resistant pathogens  - Continue ceftriaxone 2g Q24 per ID, transition to cefuroxime 250mg BID PO to complete 5 day (7/29-8/2) abx course if discharged prior to 5 days ceftriaxone being completed - per ID recs  - Ucx 30-39k CFU, but recommend recollection d/t possible contaminants  - Follow-up CT AP to rule out anatomic defects/renal calculi/worsening prostatic megaly with undiagnosed BPH contributing to recurrent UTIs  - Continue finasteride 5mg QD per urology recs  - Should follow-up with urology outpatient with Dr. Wade for BPH treatments

## 2024-07-28 NOTE — PROGRESS NOTES
Spoke with patient's caregiver and son and confirmed with medication list from [facility] and/or patient's own pharmacy to complete the medication reconciliation.     Prior to admission medication list:    Current Outpatient Medications:     bimatoprost (LUMIGAN) 0.01 % ophthalmic drops, Administer 1 drop into the left eye nightly.    bromfenac (PROLENSA) 0.07 % drops, Administer 1 drop into the right eye daily.    calcium carbonate (TUMS) 200 mg calcium (500 mg) chewable tablet, Take 1 tablet by mouth 3 (three) times a day.    clopidogreL (PLAVIX) 75 mg tablet, Take 75 mg by mouth every morning.    cyanocobalamin (VITAMIN B12) 500 mcg tablet, Take 500 mcg by mouth every morning.    escitalopram (LEXAPRO) 5 mg tablet, Take 5 mg by mouth every morning.    famotidine (PEPCID) 10 mg tablet, Take 1 tablet (10 mg total) by mouth daily Indications: gastroesophageal reflux disease.    levothyroxine (SYNTHROID) 100 mcg tablet, Take 100 mcg by mouth daily.    lidocaine (ASPERCREME) 4 % adhesive patch,medicated topical patch, Apply 1 patch topically daily as needed. Remove & discard patch within 12 hours or as directed by prescriber.    loteprednol (LOTEMAX) 0.5 % ophthalmic suspension, Administer 1 drop into the right eye 4 (four) times a day.    magnesium oxide (MAG-OX) 400 mg (241.3 mg magnesium) tablet, Take 200 mg by mouth 4 (four) times a day. Difficult to swallow at 400 mg, so son found 200 mg. So taking 4 times a day.    melatonin 3 mg tablet, Take 3 mg by mouth nightly.    methenamine (HIPREX) 1 gram tablet, Take 1 g by mouth daily before lunch.    metoprolol succinate XL (TOPROL-XL) 25 mg 24 hr tablet, Take 25 mg by mouth daily with dinner.    omeprazole (PriLOSEC) 20 mg capsule, Take 20 mg by mouth daily with lunch.    potassium chloride (KLOR-CON) 10 mEq CR tablet, Take 20 mEq by mouth 2 (two) times a day.    thiamine 50 mg tablet, Take 50 mg by mouth daily.    tobramycin (TOBREX) 0.3 % ophthalmic solution,  Administer 1 drop into the right eye 4 (four) times a day.    torsemide (DEMADEX) 10 mg tablet, Take 20 mg by mouth 2 (two) times a day.    Comments about home medications:    Patient completed ciprofloxacin course prescribed 7/11 for 7 days with no results. Patient began a 10 day course of Bactrim Monday 7/22 still with no results.    Compliance:     -clopidogrel filled 2/19 for 90 days  -metoprolol filled 3/29 for 30 days   -remaining medications have recent fills

## 2024-07-28 NOTE — ASSESSMENT & PLAN NOTE
Follows with  at [facility]  Most recent echo in 03/2024 with EF 45 to 50%, severe TR  On admission appears hypovolemic/euvolemic, saturating appropriately on room air  , Trop 22.9--> 23.2.  VBG 7.38/40.  CXR with increasing left pleural effusion    Does not appear to be in CHF exacerbation    - S/p 60 mg Lasix in the ED  - Would hold further diuresis given concern for infection and dehydration contributing to altered mental status  - Continue rest of GDMT including metoprolol given no SIRS/sepsis physiology.  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

## 2024-07-28 NOTE — ASSESSMENT & PLAN NOTE
P/w Cr 2.5 from baseline 1.4, BUN 29  UA with mild pyuria, +1LE  BUN/CR ratio 11    Likely intrinsic renal injury with contribution from hypovolemia/brewing infection    -F/u bladder scan if retaining on bladder scan, straight cath to obtain a urine sample  -Follow-up urine lites including urine urea given recent diuretic use  -Follow-up CT CAP to evaluate for hydro/renal calculi  - Cr 1.9 today, improving

## 2024-07-28 NOTE — ASSESSMENT & PLAN NOTE
Presenting with BP 110s to 120s/70s  Home meds include amlodipine, metoprolol, torsemide    - Resume as long as BP tolerates

## 2024-07-28 NOTE — ED PROVIDER NOTES
Emergency Medicine Note  HPI   HISTORY OF PRESENT ILLNESS     Samy Elena is a 89 y.o. male with PMH of hypertension, hyperlipidemia, CAD, orthostatic hypotension, CHF last EF 45-50% 2024, CKD, cardiomyopathy, MI, TIA, recurrent UTI who presents to the ED today via EMS for evaluation of shortness of breath and altered mental status since 2 to 3 days.  Per EMS, family and caregiver called ambulance as patient was complaining of some shortness of breath this morning and has been acting increasingly confused.  Per son and caregiver at bedside, patient over the course of the past 2 weeks has been battling a UTI.  They did 2 home test which came back positive after which primary care initially wrote ciprofloxacin without improvement of the UTI and then switch the patient over to Bactrim but they feel that his UTI is not improving.  Initially they thought he had a UTI and did a home test because he was acting a little more altered, confabulating stories and talking about how his previously  wife was with him, his family member who lives in [place] came over and visited him and gave him a present but none of these things actually happened.  Has a history of altered mental status with UTIs.  States that the symptoms have not been improving but rather worsening over the course of the past 2 days where he persistently remains confused, is having a harder time getting up and walking per his baseline, missing his mouth when attempting to feed himself.  They state that typically at baseline he is a little forgetful but does not make up stories such as this, does not have dementia at this back.  Is able to take care of himself with the assistance of his caregiver but caregiver notices that his baseline is decreased as well compared to normal.  Patient currently denies any symptoms including any chest pain or difficulty breathing but caregiver reports this morning he told her he was having a hard time breathing.   Patient denies any other symptoms.  Caregiver and son at bedside denies any fevers, complaints of dizziness, nausea, vomiting, diarrhea, abdominal pain.        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    Social History     Tobacco Use   • Smoking status: Never   • Smokeless tobacco: Never   Vaping Use   • Vaping Use: Never used   Substance Use Topics   • Alcohol use: Not Currently     Comment: social   • Drug use: Never         Review of Systems   REVIEW OF SYSTEMS     See HPI above.        VITALS     ED Vitals      Date/Time Temp Pulse Resp BP SpO2 [facility]   07/28/24 1228 -- 64 16 -- 97 % VD   07/28/24 1223 -- -- 16 -- -- VD   07/28/24 1223 36.7 °C (98 °F) 63 -- 133/68 97 % JRN          Pulse Ox %: 97 % (07/28/24 1328)  Pulse Ox Interpretation: Normal (07/28/24 1328)  Heart Rate: 64 (07/28/24 1328)  Rhythm Strip Interpretation: Normal Sinus Rhythm (07/28/24 1328)     Physical Exam   PHYSICAL EXAM     PHYSICAL EXAM  GEN: well appearing, well nourished, pleasant, smiling male in no acute distress, positioned upright on the stretcher moving all extremities  HEENT: atraumatic, normocephalic, nonicteric sclera, PERRL  NECK: soft, supple  CV: normal  rate and irregular rhythm, no murmurs, rubs, or gallops noted   PULM: normal effort and breathing, no accessory muscle use, diminished breath sounds left side with fine crackles  ABO: NT, ND, normoactive BSx4, no rebound, rigidity, or guarding, no CVAT.  Colostomy left lower quadrant  MSK: no cyanosis, clubbing, or edema noted. Peripheral pulses equal and symmetric at radial, DP    SKIN: warm, dry, and well perfused, no decreased skin turgor, capillary refills <3 secs  NEURO: Alert, awake, oriented to self, location, situation, answering questions appropriately   PSYCH: normal mood and affect        PROCEDURES     Procedures     DATA     Results       Procedure Component Value Units Date/Time    Blood gas, venous [[id number]]  (Abnormal) Collected: 07/28/24 1534    Specimen: Blood, Venous Updated: 07/28/24 1545     pH, Venous 7.38     pCO2, Venous 40 mm Hg      pO2, Venous 20 mm Hg      Comment: PO2 values <30 have not been validated by the laboratory. Suggest clinical correlation.        HCO3, Venous 22.1 mEQ/L      Base Excess, Venous -1.3 mEQ/L      TCO2, Venous 24.9 mEQ/L      Source Of Oxygen ra     FIO2 21    SARS-COV-2 (COVID-19)/ FLU A/B, AND RSV, PCR Nasopharynx [[id number]]  (Normal) Collected: 07/28/24 1320    Specimen: Nasopharyngeal Swab from Nasopharynx Updated: 07/28/24 1425     SARS-CoV-2 (COVID-19) Negative     Influenza A Negative     Influenza B Negative     Respiratory Syncytial Virus Negative    Narrative:      Testing performed using real-time PCR for detection of COVID-19. EUA approved validation studies performed on site.     UA w/ reflex culture (ED Only) [[id number]]  (Abnormal) Collected: 07/28/24 1320    Specimen: Urine, Clean Catch Updated: 07/28/24 1354    Narrative:      The following orders were created for panel order UA w/ reflex culture (ED Only).  Procedure                               Abnormality         Status                     ---------                               -----------          ------                     UA Reflex to Culture (Ma...[[id number]]  Abnormal            Final result               UA Microscopic[[id number]]               Abnormal            Final result                 Please view results for these tests on the individual orders.    UA Microscopic [[id number]]  (Abnormal) Collected: 07/28/24 1320    Specimen: Urine, Clean Catch Updated: 07/28/24 1354     RBC, Urine 0 TO 4 /HPF      WBC, Urine 35 TO 50 /HPF      Squamous Epithelial Rare /hpf      Hyaline Cast 3 TO 9 /lpf      Bacteria, Urine None Seen /HPF      Mucus Rare /LPF     UA Reflex to Culture (Macroscopic) [[id number]]  (Abnormal) Collected: 07/28/24 1320    Specimen: Urine, Clean Catch Updated: 07/28/24 1351     Color, Urine Yellow     Clarity, Urine Clear     Specific Gravity, Urine 1.013     pH, Urine 7.0     Leukocyte Esterase +1     Nitrite, Urine Negative     Protein, Urine Negative     Glucose, Urine Negative mg/dL      Ketones, Urine Negative mg/dL      Urobilinogen, Urine 0.2 EU/dL      Bilirubin, Urine Negative mg/dL      Blood, Urine Negative     Comment: The sensitivity of the occult blood test is equivalent to approximately 4 intact RBC/HPF.       Lactate, w/ reflex repeat if > 2.0 [[id number]]  (Abnormal) Collected: 07/28/24 1324    Specimen: Blood, Venous Updated: 07/28/24 1343     Lactate 2.1 mmol/L     HS Troponin (with 2 hour reflex) [[id number]]  (Abnormal) Collected: 07/28/24 1229    Specimen: Blood, Venous Updated: 07/28/24 1336     High Sens Troponin I 22.9 pg/mL     B-type natriuretic peptide [[id number]]  (Abnormal) Collected: 07/28/24 1229    Specimen: Blood, Venous Updated: 07/28/24 1335      pg/mL     Magnesium [[id number]]  (Normal) Collected: 07/28/24 1229    Specimen: Blood, Venous Updated: 07/28/24 1328     Magnesium 2.4 mg/dL     Comprehensive metabolic panel [[id number]]  (Abnormal) Collected: 07/28/24 1229    Specimen: Blood, Venous Updated: 07/28/24 1325     Sodium 138 mEQ/L       Potassium 5.2 mEQ/L      Comment: Results obtained on plasma. Plasma Potassium values may be up to 0.4 mEQ/L less than serum values. The differences may be greater for patients with high platelet or white cell counts.        Chloride 106 mEQ/L      CO2 22 mEQ/L      BUN 29 mg/dL      Creatinine 2.5 mg/dL      Glucose 116 mg/dL      Calcium 9.4 mg/dL      AST (SGOT) 42 IU/L      ALT (SGPT) 7 IU/L      Alkaline Phosphatase 89 IU/L      Total Protein 9.3 g/dL      Comment: Test performed on plasma which typically contains approximately 0.4 g/dL more protein than serum.        Albumin 3.7 g/dL      Bilirubin, Total 1.2 mg/dL      eGFR 23.5 mL/min/1.73m*2      Comment: Calculation based on the Chronic Kidney Disease Epidemiology Collaboration (CKD-EPI) equation refit without adjustment for race.        Anion Gap 10 mEQ/L     CBC and differential [[id number]]  (Abnormal) Collected: 07/28/24 1229    Specimen: Blood, Venous Updated: 07/28/24 1259     WBC 5.68 K/uL      RBC 3.47 M/uL      Hemoglobin 11.0 g/dL      Hematocrit 34.8 %      .3 fL      MCH 31.7 pg      MCHC 31.6 g/dL      RDW 18.9 %      Platelets 125 K/uL      MPV 10.6 fL      Differential Type Auto     nRBC 0.0 %      Immature Granulocytes 0.2 %      Neutrophils 74.1 %      Lymphocytes 12.3 %      Monocytes 10.7 %      Eosinophils 1.8 %      Basophils 0.9 %      Immature Granulocytes, Absolute 0.01 K/uL      Neutrophils, Absolute 4.21 K/uL      Lymphocytes, Absolute 0.70 K/uL      Monocytes, Absolute 0.61 K/uL      Eosinophils, Absolute 0.10 K/uL      Basophils, Absolute 0.05 K/uL             Imaging Results              CT HEAD WITHOUT IV CONTRAST (Final result)  Result time 07/28/24 14:39:21      Final result                   Impression:    IMPRESSION: No acute intracranial abnormalities.    COMMENT: Computed tomography of the brain was performed at 3.0 mm intervals  without intravenous contrast administration.    The current study was compared to that  dated August 3, 2023    There is no evidence of mass effect or shift of the midline structures.    There is ventricular prominence and cortical sulcal prominence, consistent with  involutional changes.    There is no evidence of acute intracranial hemorrhage or acute cortical  infarction.    There are no extra-axial fluid collections.    There is patchy periventricular white matter hypoattenuation, consistent with  microangiopathic changes.    The bony calvarium is intact. Imaged portions of the paranasal sinuses and  mastoid air cells are clear and intact.    CT DOSE:  One or more dose reduction techniques (e.g. automated exposure  control, adjustment of the mA and/or kV according to patient size, use of  iterative reconstruction technique) utilized for this examination.                 Narrative:    CLINICAL HISTORY: Mental status change.                                       X-RAY CHEST 1 VIEW (Final result)  Result time 07/28/24 12:54:12      Final result                   Impression:    IMPRESSION: Increasing left pleural effusion.    COMMENT: The current portable study the chest was compared to that dated May 31,  2024    Further opacification of the left hemithorax is seen secondary to an increasing  left pleural effusion. The lower half of the left hemithorax is now obscured.    Additional volume loss is likely present in the right retrocardiac region. No  right pleural effusion is seen    There is no gross pulmonary vascular congestion or edema.               Narrative:    CLINICAL HISTORY: Shortness of breath                                      ECG 12 lead    (Results Pending)       Scoring tools                                  ED Course & MDM   MDM / ED COURSE / CLINICAL IMPRESSION / DISPO     Medical Decision Making  89 y.o. male with past medical history as above presenting with altered mental status ongoing over the course of the past 2 weeks progressively alongside shortness of breath this morning,  weight gain over the course of the past week    Per chart review, patient with 7 pound weight gain 07/22 at 171 with baseline typically around 160-165.  Previously torsemide 10 mg once daily was increased to 20 mg for 3 days without improvement therefore was increased once again to 20 mg twice daily.  Since then, patient has had improvement in his weight but this morning began to complain of some shortness of breath therefore they brought him in for evaluation.    Based on history and presentation, differentials include CHF exacerbation, pleural effusion, respiratory acidosis, low suspicion CVA, low suspicion mass, UTI, sepsis, electrolyte derangements, ACS unlikely, pneumonia unlikely, viral illness less likely.    Will obtain semisepsis workup in the ED today alongside cardiopulmonary labs    Will provide the patient with diuresis and reassess periodically.     Blood work overall well-preserved aside from elevated BNP, mildly elevated troponin and lactic acid likely in the setting of infection and demand, peripheral examination overall preserved but patient with significant large left-sided effusion recurrent based on previous chart review, worse compared to previous.  Will provide IV diuresis in the ED, uptrending creatinine over the course of the past several months with potential worsening of CKD versus superimposed CALI.  Urinalysis with some pyuria concerning for infection even in the setting of some sterilization with recent's.  Discussed with family members alongside need for admission to for IV antibiotics in the setting of altered mental status with potential infection, CHF exacerbation.  They are amenable with this care plan as discussed.  Patient is amenable as well although states he prefers not to be admitted understands he needs to be.  Patient admitted to [facility].     The patient expressed understanding of and agreement with all items discussed and further progression of care plan.    Amount and/or  Complexity of Data Reviewed  Independent Historian: guardian and caregiver     Details: Son  External Data Reviewed: labs, radiology and notes.  Labs: ordered. Decision-making details documented in ED Course.  Radiology: ordered and independent interpretation performed. Decision-making details documented in ED Course.  ECG/medicine tests: ordered and independent interpretation performed. Decision-making details documented in ED Course.    Risk  Prescription drug management.  Decision regarding hospitalization.        ED Course as of 07/28/24 1739   Sun Jul 28, 2024   1345 ECG 12 lead  Independent interpretation.  Atrial fibrillation with a rate of 69, nonspecific ST-T wave abnormality, no STEMI [DJ]   1352 X-RAY CHEST 1 VIEW  Independent of rotation.  Left-sided pleural effusion, no other acute cardiopulmonary findings       --  IMPRESSION: Increasing left pleural effusion.   [DJ]   1353 CBC and differential(!)  No leukocytosis, mild thrombocytopenia noted, mild anemia 11.  Improved compared to previous on file [DJ]   1353 Comprehensive metabolic panel(!)  CALI with a creatinine of 2.5, mildly increased from 2.1 one month previous but patient appears to have a baseline typically at 1.5-1.7 no severe electrolyte derangements or transaminitis noted [DJ]   1354 Magnesium: 2.4  Normal [DJ]   1354 BNP(!): 740  Mild elevation [DJ]   1354 High Sens Troponin I(!): 22.9  Borderline elevated, likely secondary to demand, will obtain delta [DJ]   1354 Lactate(!): 2.1  Borderline elevated, will defer fluids in the setting of heart failure with left-sided effusion, current shortness of breath [DJ]   1428 SARS-COV-2 (COVID-19)/ FLU A/B, AND RSV, PCR Nasopharynx  Negative [DJ]   1442 CT HEAD WITHOUT IV CONTRAST  Independent interpretation.  Negative for acute intracranial abnormality    --  IMPRESSION: No acute intracranial abnormalities.      [DJ]   1442 UA w/ reflex culture (ED Only)(!)  Leukocytes, pyuria, no bacteria or nitrates  seen but could be due to continuous antibiotic use over the course of the past 10 days [DJ]   1550 Blood gas, venous(!)  No acid-base imbalance, no significant CO2 retention [DJ]   1550 [facility] paged x1 [DJ]   160 Discussed with [facility], patient accepted for admission.  Discussed with family including patient, son, caregiver at bedside.  Amenable with admission.  Son is power of , patient is DNR. [DJ]   9641 Discussed with resident team for admission [DJ]      ED Course User Index  [JESS] Shelly Bond PA C     Clinical Impression      Lower urinary tract infection   Acute encephalopathy   Pleural effusion on left   Acute on chronic congestive heart failure, unspecified heart failure type (CMS/[facility])     _________________       ED Disposition   Admit / Observation                       Shelly Bond PA C  07/28/24 0594

## 2024-07-28 NOTE — ASSESSMENT & PLAN NOTE
History of A-fib maintained on metoprolol and not AC  Reported history of GI bleeds    - Rate controlled in the ED  - Follow-up EKG, unable to locate in the ED  - Continue metoprolol

## 2024-07-28 NOTE — ASSESSMENT & PLAN NOTE
2 weeks of AMS with delusions/hallucinations similar to prior UTI episodes  BUN 29, not significantly elevated.  No leukocytosis, no other electrolyte derangements.  VBG 7.38/40  Recent UTI likely inadequately treated  CT head negative  No toxin exposure    Likely toxic metabolic encephalopathy in the setting of ongoing/undertreated UTI plus dehydration 2/2 poor p.o. intake    - S/p 1 dose ceftaz, narrow as able  - Follow-up B12, folate, TSH for completeness particularly given history of hypothyroidism  - F/u ID consult  - Multi-component delirium prevention (minimize deliriogenic medications, promote OOB, avoid restraints/lines/drains, avoid sensory deprivation, promote regular sleep-wake cycle, frequent reorientation)  -C/f Aspiration, SLP video study shows profound pharyngeal phase dysphagia. Conversation with patient, family, and palliative care was had, they prefer comfort feeding.  - OT recs home with assistance

## 2024-07-28 NOTE — H&P
Internal Medicine  History & Physical        CHIEF COMPLAINT   AMS/sob     HISTORY OF PRESENT ILLNESS      This is a 89 y.o. male with a past medical history of HFrEF (45-50%), nonrheumatic TR, AF not on AC, HTN, hx diverticulitis s/p colostomy, and recurrent pleural effusions requiring L-sided thoracentesis, history of Justin's gangrene with SGLT2 use, CAD s/p CABG in  on Plavix who presents with AMS x2 weeks, SOB x1 day.    Patient is accompanied by his son Marko who is his power of , as well as his caregiver with whom the patient lives.  They tell me patient has had a progressive change in his mental status, appearing more confused and having delusions/hallucinations, example he was seeing his  wife in the room, reaching for things that were not there, etc.  This spans over the past 2 weeks; initially they got a point-of-care test from [facility] to test for a UTI which turned out to be positive.  Additionally, the son mentions the symptoms are classic for UTIs as he has displayed the same change in mental status with previous  infections.  Per his PCP, he was given ciprofloxacin which did not result in improvement.  He was subsequently started on Bactrim which again did not help.  Yesterday he developed acute shortness of breath at rest (of note, he is largely wheelchair-bound) which prompted this ED visit along with persistent AMS.  He has not had any fever/chills, dysuria/frequency/urgency, nausea/vomiting, chest pain, palpitations.  He has not had a change in his torsemide dosing recently where his outpatient cardiologist uptitrated his torsemide and instructed him to take up to 40 mg daily based on weight changes.  Over the past 3 days they have only given him 10 mg daily as he has been at or below his dry weight.  Patient also mentions he had an episode of nonbloody, nonbilious emesis when he attempted to take his p.o. meds this morning.  He otherwise denies regular alcohol use (drinks  socially), denies tobacco use or any other illicit drug use.    Of note, at the time of my interview with the patient the son says his mental status has returned nearly to baseline and that he looked a lot better after eating.    ED Course:  Vitals -  Vitals:    07/28/24 1900   BP: 124/60   Pulse: 68   Resp: 16   Temp:    SpO2:      Labs -  Results from last 7 days   Lab Units 07/28/24  1229   WBC K/uL 5.68   HEMOGLOBIN g/dL 11.0*   HEMATOCRIT % 34.8*   PLATELETS K/uL 125*     Results from last 7 days   Lab Units 07/28/24  1229   SODIUM mEQ/L 138   POTASSIUM mEQ/L 5.2*   CHLORIDE mEQ/L 106   CO2 mEQ/L 22   BUN mg/dL 29*   CREATININE mg/dL 2.5*   CALCIUM mg/dL 9.4   ALBUMIN g/dL 3.7   BILIRUBIN TOTAL mg/dL 1.2   ALK PHOS IU/L 89   ALT IU/L 7   AST IU/L 42*   GLUCOSE mg/dL 116*       Imaging - CTH negative   CXR Increasing left pleural effusion.     Interventions -1x Ceftaz, 1x 60mg Lasix     Patient is DNR/DNI. Son Marko is his POA.     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)     Coronary artery disease     Disease of thyroid gland     Will catheter in place     6/25 not at present    GI (gastrointestinal bleed)     Hypertension     Myocardial infarction (CMS/HCC)     Orthostatic hypotension     TIA (transient ischemic attack)        PSHx:  Past Surgical History:   Procedure Laterality Date    CATARACT EXTRACTION W/ INTRAOCULAR LENS IMPLANT Left     CHOLECYSTECTOMY      COLOSTOMY      CORONARY ARTERY BYPASS GRAFT      JOINT REPLACEMENT Left     Knee    THYROIDECTOMY         PCP:   Zachery Hernandez MD    MEDICATIONS      Prior to Admission medications    Medication Sig Start Date End Date Taking? Authorizing Provider   bimatoprost (LUMIGAN) 0.01 % ophthalmic drops Administer 1 drop into the left eye nightly.    Yes Provider, Clay Lopez MD   bromfenac (PROLENSA) 0.07 % drops Administer 1 drop into the right eye daily.   Yes Clay Chambers MD   calcium carbonate (TUMS) 200 mg calcium (500 mg) chewable tablet Take 1 tablet by mouth 3 (three) times a day.   Yes Clay Chambers MD   clopidogreL (PLAVIX) 75 mg tablet Take 75 mg by mouth every morning.   Yes Clay Chambers MD   cyanocobalamin (VITAMIN B12) 500 mcg tablet Take 500 mcg by mouth every morning.   Yes ProviderClay MD   escitalopram (LEXAPRO) 5 mg tablet Take 5 mg by mouth every morning.   Yes Clay Chambers MD   famotidine (PEPCID) 10 mg tablet Take 1 tablet (10 mg total) by mouth daily Indications: gastroesophageal reflux disease. 12/8/23 3/26/25 Yes Crystal Alonso,    levothyroxine (SYNTHROID) 100 mcg tablet Take 100 mcg by mouth daily.   Yes Clay Chambers MD   lidocaine (ASPERCREME) 4 % adhesive patch,medicated topical patch Apply 1 patch topically daily as needed. Remove & discard patch within 12 hours or as directed by prescriber.   Yes Clay Chambers MD   loteprednol (LOTEMAX) 0.5 % ophthalmic suspension Administer 1 drop into the right eye 4 (four) times a day.   Yes ProviderClay MD   magnesium oxide (MAG-OX) 400 mg (241.3 mg magnesium) tablet Take 200 mg by mouth 4 (four) times a day. Difficult to swallow at 400 mg, so son found 200 mg. So taking 4 times a day.   Yes ProviderClay MD   melatonin 3 mg tablet Take 3 mg by mouth nightly.   Yes Clay Chambers MD   methenamine (HIPREX) 1 gram tablet Take 1 g by mouth daily before lunch.   Yes Clay Chambers MD   metoprolol succinate XL (TOPROL-XL) 25 mg 24 hr tablet Take 25 mg by mouth daily with dinner.   Yes ProviderClay MD   omeprazole (PriLOSEC) 20 mg capsule Take 20 mg by mouth daily with lunch. 5/25/24  Yes Clay Chambers MD   potassium chloride  (KLOR-CON) 10 mEq CR tablet Take 20 mEq by mouth 2 (two) times a day.   Yes Clay Chambers MD   thiamine 50 mg tablet Take 50 mg by mouth daily.   Yes Clay Chambers MD   tobramycin (TOBREX) 0.3 % ophthalmic solution Administer 1 drop into the right eye 4 (four) times a day.   Yes Clay Chambers MD   torsemide (DEMADEX) 10 mg tablet Take 20 mg by mouth 2 (two) times a day.   Yes Clay Chambers MD   amLODIPine (NORVASC) 2.5 mg tablet amlodipine 2.5 mg tablet  7/22/22  John Chambers MD   apixaban (ELIQUIS) 2.5 mg tablet   7/22/22  John Chambers MD   ciprofloxacin (CIPRO) 250 mg tablet TAKE 1 TABLET BY MOUTH EVERY 12 HOURS FOR 7 DAYS 7/11/24 7/28/24  Clay Chambers MD   hydrochlorothiazide (MICROZIDE) 12.5 mg capsule hydrochlorothiazide 12.5 mg capsule  7/22/22  John Chambers MD   loteprednol etabonate (LOTEMAX) 0.5 % ointment Apply 1 Application to right eye 4 (four) times a day.  7/28/24  Clay Chambers MD   metoprolol succinate XL (TOPROL-XL) 25 mg 24 hr tablet Take 0.5 tablets (12.5 mg total) by mouth daily.  Patient taking differently: Take 12.5 mg by mouth daily. 5pm 3/29/24 7/28/24  Abdi Moses MD   SYNTHROID 100 mcg tablet Take 1 tablet (100 mcg total) by mouth daily.  Patient taking differently: Take 100 mcg by mouth daily. 3/29/24 7/28/24  Abdi Moses MD       Home medications were personally reviewed.    ALLERGIES      Jardiance [empagliflozin]    FAMILY HISTORY      No family history on file.    SOCIAL HISTORY      Social History     Socioeconomic History    Marital status:    Tobacco Use    Smoking status: Never    Smokeless tobacco: Never   Vaping Use    Vaping Use: Never used   Substance and Sexual Activity    Alcohol use: Not Currently     Comment: social    Drug use: Never    Sexual activity: Defer     Social Determinants of Health     Financial Resource Strain: Low Risk  (8/4/2023)    Overall  Financial Resource Strain (CARDIA)     Difficulty of Paying Living Expenses: Not hard at all   Food Insecurity: No Food Insecurity (7/28/2024)    Hunger Vital Sign     Worried About Running Out of Food in the Last Year: Never true     Ran Out of Food in the Last Year: Never true   Transportation Needs: No Transportation Needs (3/27/2024)    PRAPARE - Transportation     Lack of Transportation (Medical): No     Lack of Transportation (Non-Medical): No   Housing Stability: Low Risk  (3/27/2024)    Housing Stability Vital Sign     Unable to Pay for Housing in the Last Year: No     Number of Places Lived in the Last Year: 1     Unstable Housing in the Last Year: No       REVIEW OF SYSTEMS      Review of Systems    PHYSICAL EXAMINATION      Temp:  [36.7 °C (98 °F)] 36.7 °C (98 °F)  Heart Rate:  [61-68] 68  Resp:  [16-17] 16  BP: (124-133)/(60-72) 124/60  Body mass index is 20.45 kg/m².    Physical Exam  Constitutional:       General: He is not in acute distress.     Comments: Frail but pleasant elderly gentleman   HENT:      Head: Normocephalic and atraumatic.      Mouth/Throat:      Mouth: Mucous membranes are dry.   Eyes:      Comments: Mild anisocoria with L pupil >R   Cardiovascular:      Rate and Rhythm: Normal rate.      Pulses: Normal pulses.      Heart sounds: Normal heart sounds. No murmur heard.  Pulmonary:      Effort: Pulmonary effort is normal. No respiratory distress.      Breath sounds: Normal breath sounds.      Comments: Faint crackles most prominent at R lung base  Abdominal:      Palpations: Abdomen is soft.      Comments: Colostomy bag with brown stool content   Musculoskeletal:      Right lower leg: No edema.      Left lower leg: No edema.   Skin:     General: Skin is warm and dry.   Neurological:      Mental Status: He is alert and oriented to person, place, and time.      Comments: Oriented to self, location including the fact that it is [facility] [place], oriented to year but thought the month was October    Psychiatric:         Mood and Affect: Mood normal.         LABS / IMAGING / EKG        Labs:  Results from last 7 days   Lab Units 07/28/24  1229   WBC K/uL 5.68   HEMOGLOBIN g/dL 11.0*   HEMATOCRIT % 34.8*   PLATELETS K/uL 125*     Results from last 7 days   Lab Units 07/28/24  1229   SODIUM mEQ/L 138   POTASSIUM mEQ/L 5.2*   CHLORIDE mEQ/L 106   CO2 mEQ/L 22   BUN mg/dL 29*   CREATININE mg/dL 2.5*   CALCIUM mg/dL 9.4   ALBUMIN g/dL 3.7   BILIRUBIN TOTAL mg/dL 1.2   ALK PHOS IU/L 89   ALT IU/L 7   AST IU/L 42*   GLUCOSE mg/dL 116*       Microbiology Data personally reviewed:  Microbiology Results       Procedure Component Value Units Date/Time    SARS-COV-2 (COVID-19)/ FLU A/B, AND RSV, PCR Nasopharynx [[id number]]  (Normal) Collected: 07/28/24 1320    Specimen: Nasopharyngeal Swab from Nasopharynx Updated: 07/28/24 1425     SARS-CoV-2 (COVID-19) Negative     Influenza A Negative     Influenza B Negative     Respiratory Syncytial Virus Negative    Narrative:      Testing performed using real-time PCR for detection of COVID-19. EUA approved validation studies performed on site.             Imaging personally reviewed(does not include unread studies):  CT HEAD WITHOUT IV CONTRAST    Result Date: 7/28/2024  IMPRESSION: No acute intracranial abnormalities. COMMENT: Computed tomography of the brain was performed at 3.0 mm intervals without intravenous contrast administration. The current study was compared to that dated August 3, 2023 There is no evidence of mass effect or shift of the midline structures. There is ventricular prominence and cortical sulcal prominence, consistent with involutional changes. There is no evidence of acute intracranial hemorrhage or acute cortical infarction. There are no extra-axial fluid collections. There is patchy periventricular white matter hypoattenuation, consistent with microangiopathic changes. The bony calvarium is intact. Imaged portions of the paranasal sinuses and mastoid air  cells are clear and intact. CT DOSE:  One or more dose reduction techniques (e.g. automated exposure control, adjustment of the mA and/or kV according to patient size, use of iterative reconstruction technique) utilized for this examination.     X-RAY CHEST 1 VIEW    Result Date: 7/28/2024  IMPRESSION: Increasing left pleural effusion. COMMENT: The current portable study the chest was compared to that dated May 31, 2024 Further opacification of the left hemithorax is seen secondary to an increasing left pleural effusion. The lower half of the left hemithorax is now obscured. Additional volume loss is likely present in the right retrocardiac region. No right pleural effusion is seen There is no gross pulmonary vascular congestion or edema.     ECG/Telemetry  EKG pending    ASSESSMENT AND PLAN           * Encephalopathy  Assessment & Plan  2 weeks of AMS with delusions/hallucinations similar to prior UTI episodes  BUN 29, not significantly elevated.  No leukocytosis, no other electrolyte derangements.  VBG 7.38/40  Recent UTI likely inadequately treated  CT head negative  No toxin exposure    Likely toxic metabolic encephalopathy in the setting of ongoing/undertreated UTI plus dehydration 2/2 poor p.o. intake    - S/p 1 dose ceftaz, continue broad GNR coverage for now, narrow as able  - SLP consult to ensure aspiration is not contributing to AMS  - Follow-up B12, folate, TSH for completeness particularly given history of hypothyroidism  - ID consult for ABX  - Multi-component delirium prevention (minimize deliriogenic medications, promote OOB, avoid restraints/lines/drains, avoid sensory deprivation, promote regular sleep-wake cycle, frequent reorientation)    CALI (acute kidney injury) (CMS/[facility])  Assessment & Plan  P/w Cr 2.5 from baseline 1.4, BUN 29  UA with mild pyuria, +1LE  BUN/CR ratio 11    Likely intrinsic renal injury with contribution from hypovolemia/brewing infection    -F/u bladder scan if retaining on  bladder scan, straight cath to obtain a urine sample  -Follow-up urine lites including urine urea given recent diuretic use  -Follow-up CT CAP to evaluate for hydro/renal calculi    Shortness of breath  Assessment & Plan  P/W 1 day acute SOB at rest  History of recurrent pleural effusions requiring L sided thoracentesis  VBG without hypercarbia or hypoxia.  No leukocytosis or acute anemia  Euvolemic to dry on exam, saturating appropriately on room air  CXR with worsening left pleural effusion    - Hold off on thoracentesis for now as he is on room air and comfortable  - S/p 1 dose 60 mg Lasix in the ED, would hold off on further diuretics given concern for  infection  - Lower concern for PNA given lack of focal consolidation on imaging, leukocytosis or PNA symptoms  - Monitor respiratory symptoms and O2 needs closely    HFrEF (heart failure with reduced ejection fraction) (CMS/[facility])  Assessment & Plan  Follows with  at [facility]  Most recent echo in 03/2024 with EF 45 to 50%, severe TR  On admission appears hypovolemic/euvolemic, saturating appropriately on room air  , Trop 22.9--> 23.2.  VBG 7.38/40.  CXR with increasing left pleural effusion    Does not appear to be in CHF exacerbation    - S/p 60 mg Lasix in the ED  - Would hold further diuresis given concern for infection and dehydration contributing to altered mental status  - Continue rest of GDMT including metoprolol given no SIRS/sepsis physiology.  Does not appear to be on ACE/ARB or MRA  - SGLT2 indefinitely discontinued 2/2 history of Justin's gangrene  - Daily I's/O, standing weights    History of recurrent UTIs  Assessment & Plan  History of frequent UTIs as reported by son  No reported history of renal calculi or BPH however  UA with mild pyuria 35-50 WBC, no bacteria.  No ketones, protein, blood +1 LE.  CT from 08/2023 showed Multiple small nonobstructing right renal calculi  CT from 10/2022 showed prostatic  megaly  Presenting today with AMS similar to prior episodes of UTIs    Concern for recurrent UTI unresolved with OP treatment with Bactrim and Cipro.  UA may be partially sterilized now after outpatient ABX.    - Concern for resistant pathogens  - Continue broad GNR coverage with ceftaz  - Follow-up urine culture and narrow ABX as able  - Follow-up CT AP to rule out anatomic defects/renal calculi/worsening prostatic megaly with undiagnosed BPH contributing to recurrent UTIs  - Should follow-up with urology outpatient    Hypertension  Assessment & Plan  Presenting with BP 110s to 120s/70s  Home meds include amlodipine, metoprolol, torsemide    - Resume as long as BP tolerates    A-fib (CMS/[facility])  Assessment & Plan  History of A-fib maintained on metoprolol and not AC  Reported history of GI bleeds    - Rate controlled in the ED  - Follow-up EKG, unable to locate in the ED  - Continue metoprolol         VTE Assessment: Padua    VTE Prophylaxis: Current anticoagulants:  heparin (porcine) 5,000 unit/mL injection 5,000 Units, subcutaneous, q8h [facility]  heparin (porcine) 5,000 unit/mL injection 5,000 Units, subcutaneous, q8h [facility]      Palliative Care Screen:    Code Status: DNR (A.N.D.)  Estimated discharge date: 8/1/2024     ATTENDING DOCUMENTATION  ALSO SEE ATTENDING ATTESTATION SECTION OF NOTE

## 2024-07-28 NOTE — ASSESSMENT & PLAN NOTE
P/W 1 day acute SOB at rest  History of recurrent pleural effusions requiring L sided thoracentesis  VBG without hypercarbia or hypoxia.  No leukocytosis or acute anemia  Euvolemic to dry on exam, saturating appropriately on room air  CXR with worsening left pleural effusion      - S/p 1 dose 60 mg Lasix in the ED, would hold off on further diuretics given concern for  infection  - Lower concern for PNA given lack of focal consolidation on imaging, leukocytosis or PNA symptoms  - Monitor respiratory symptoms and O2 needs closely  - Continue doxycycline 100mg BID for possible atypical pneumonia per ID recs for 5 day course (7/29-8/2)  - S/p thoracentesis 700ml removed

## 2024-07-29 ENCOUNTER — APPOINTMENT (INPATIENT)
Dept: RADIOLOGY | Facility: HOSPITAL | Age: 89
DRG: 689 | End: 2024-07-29
Payer: MEDICARE

## 2024-07-29 LAB
ANION GAP SERPL CALC-SCNC: 12 MEQ/L (ref 3–15)
ATRIAL RATE: 65
BACTERIA UR CULT: NORMAL
BACTERIA UR CULT: NORMAL
BUN SERPL-MCNC: 32 MG/DL (ref 7–25)
CALCIUM SERPL-MCNC: 9.1 MG/DL (ref 8.6–10.3)
CHLORIDE SERPL-SCNC: 104 MEQ/L (ref 98–107)
CHLORIDE UR-SCNC: 152 MEQ/L
CO2 SERPL-SCNC: 22 MEQ/L (ref 21–31)
CREAT SERPL-MCNC: 2.6 MG/DL (ref 0.7–1.3)
CREAT UR-MCNC: 22.8 MG/DL
EGFRCR SERPLBLD CKD-EPI 2021: 22.4 ML/MIN/1.73M*2
EOSINOPHIL URNS QL MICRO: NORMAL
ERYTHROCYTE [DISTWIDTH] IN BLOOD BY AUTOMATED COUNT: 19 % (ref 11.6–14.4)
FOLATE SERPL-MCNC: 13.2 NG/ML
GLUCOSE SERPL-MCNC: 76 MG/DL (ref 70–99)
HCT VFR BLD AUTO: 38 % (ref 40.1–51)
HGB BLD-MCNC: 11.7 G/DL (ref 13.7–17.5)
MAGNESIUM SERPL-MCNC: 2.4 MG/DL (ref 1.8–2.5)
MCH RBC QN AUTO: 32.2 PG (ref 28–33.2)
MCHC RBC AUTO-ENTMCNC: 30.8 G/DL (ref 32.2–36.5)
MCV RBC AUTO: 104.7 FL (ref 83–98)
MRSA DNA SPEC QL NAA+PROBE: NEGATIVE
PDW BLD AUTO: 11.2 FL (ref 9.4–12.4)
PLATELET # BLD AUTO: 121 K/UL (ref 150–350)
POTASSIUM SERPL-SCNC: 4.2 MEQ/L (ref 3.5–5.1)
POTASSIUM UR-SCNC: 30.5 MEQ/L
QRS DURATION: 170
QRS DURATION: 176
QT INTERVAL: 496
QT INTERVAL: 518
QTC CALCULATION(BAZETT): 525
QTC CALCULATION(BAZETT): 531
R AXIS: 80
R AXIS: 83
RBC # BLD AUTO: 3.63 M/UL (ref 4.5–5.8)
SODIUM SERPL-SCNC: 138 MEQ/L (ref 136–145)
SODIUM UR-SCNC: 124 MEQ/L
T WAVE AXIS: -42
T WAVE AXIS: -49
UUN UR-MCNC: 140 MG/DL
VENTRICULAR RATE: 62
VENTRICULAR RATE: 69
VIT B12 SERPL-MCNC: 1312 PG/ML (ref 180–914)
WBC # BLD AUTO: 4.73 K/UL (ref 3.8–10.5)

## 2024-07-29 PROCEDURE — 93010 ELECTROCARDIOGRAM REPORT: CPT | Performed by: INTERNAL MEDICINE

## 2024-07-29 PROCEDURE — 93010 ELECTROCARDIOGRAM REPORT: CPT | Mod: 77 | Performed by: INTERNAL MEDICINE

## 2024-07-29 PROCEDURE — 21400000 HC ROOM AND CARE CCU/INTERMEDIATE

## 2024-07-29 PROCEDURE — 92610 EVALUATE SWALLOWING FUNCTION: CPT | Mod: GN

## 2024-07-29 PROCEDURE — 93005 ELECTROCARDIOGRAM TRACING: CPT | Performed by: HOSPITALIST

## 2024-07-29 PROCEDURE — 63600000 HC DRUGS/DETAIL CODE

## 2024-07-29 PROCEDURE — 87205 SMEAR GRAM STAIN: CPT

## 2024-07-29 PROCEDURE — 99233 SBSQ HOSP IP/OBS HIGH 50: CPT | Performed by: HOSPITALIST

## 2024-07-29 PROCEDURE — 85027 COMPLETE CBC AUTOMATED: CPT

## 2024-07-29 PROCEDURE — 87641 MR-STAPH DNA AMP PROBE: CPT | Performed by: HOSPITALIST

## 2024-07-29 PROCEDURE — 93005 ELECTROCARDIOGRAM TRACING: CPT

## 2024-07-29 PROCEDURE — 25000000 HC PHARMACY GENERAL

## 2024-07-29 PROCEDURE — 80048 BASIC METABOLIC PNL TOTAL CA: CPT

## 2024-07-29 PROCEDURE — 82570 ASSAY OF URINE CREATININE: CPT

## 2024-07-29 PROCEDURE — 82746 ASSAY OF FOLIC ACID SERUM: CPT

## 2024-07-29 PROCEDURE — 74230 X-RAY XM SWLNG FUNCJ C+: CPT

## 2024-07-29 PROCEDURE — 36415 COLL VENOUS BLD VENIPUNCTURE: CPT

## 2024-07-29 PROCEDURE — 84540 ASSAY OF URINE/UREA-N: CPT

## 2024-07-29 PROCEDURE — 25800000 HC PHARMACY IV SOLUTIONS: Performed by: HOSPITALIST

## 2024-07-29 PROCEDURE — 63600000 HC DRUGS/DETAIL CODE: Mod: JZ | Performed by: HOSPITALIST

## 2024-07-29 PROCEDURE — 99223 1ST HOSP IP/OBS HIGH 75: CPT | Mod: 25 | Performed by: NURSE PRACTITIONER

## 2024-07-29 PROCEDURE — 63700000 HC SELF-ADMINISTRABLE DRUG

## 2024-07-29 PROCEDURE — 25800000 HC PHARMACY IV SOLUTIONS

## 2024-07-29 PROCEDURE — 84133 ASSAY OF URINE POTASSIUM: CPT

## 2024-07-29 PROCEDURE — 82607 VITAMIN B-12: CPT

## 2024-07-29 PROCEDURE — 99497 ADVNCD CARE PLAN 30 MIN: CPT | Mod: 25 | Performed by: NURSE PRACTITIONER

## 2024-07-29 PROCEDURE — 92611 MOTION FLUOROSCOPY/SWALLOW: CPT | Mod: GN

## 2024-07-29 PROCEDURE — 83735 ASSAY OF MAGNESIUM: CPT

## 2024-07-29 PROCEDURE — 99498 ADVNCD CARE PLAN ADDL 30 MIN: CPT | Performed by: NURSE PRACTITIONER

## 2024-07-29 RX ORDER — TAMSULOSIN HYDROCHLORIDE 0.4 MG/1
0.4 CAPSULE ORAL DAILY
Status: DISCONTINUED | OUTPATIENT
Start: 2024-07-29 | End: 2024-08-01 | Stop reason: HOSPADM

## 2024-07-29 RX ORDER — DEXTROSE, SODIUM CHLORIDE, SODIUM LACTATE, POTASSIUM CHLORIDE, AND CALCIUM CHLORIDE 5; .6; .31; .03; .02 G/100ML; G/100ML; G/100ML; G/100ML; G/100ML
INJECTION, SOLUTION INTRAVENOUS CONTINUOUS
Status: ACTIVE | OUTPATIENT
Start: 2024-07-29 | End: 2024-07-30

## 2024-07-29 RX ORDER — DEXTROSE, SODIUM CHLORIDE, SODIUM LACTATE, POTASSIUM CHLORIDE, AND CALCIUM CHLORIDE 5; .6; .31; .03; .02 G/100ML; G/100ML; G/100ML; G/100ML; G/100ML
INJECTION, SOLUTION INTRAVENOUS CONTINUOUS
Status: DISCONTINUED | OUTPATIENT
Start: 2024-07-29 | End: 2024-07-29

## 2024-07-29 RX ORDER — TOBRAMYCIN 3 MG/ML
1 SOLUTION/ DROPS OPHTHALMIC DAILY
Status: DISCONTINUED | OUTPATIENT
Start: 2024-07-30 | End: 2024-08-01 | Stop reason: HOSPADM

## 2024-07-29 RX ADMIN — LEVOTHYROXINE SODIUM 100 MCG: 0.1 TABLET ORAL at 06:41

## 2024-07-29 RX ADMIN — SODIUM CHLORIDE, SODIUM LACTATE, POTASSIUM CHLORIDE, CALCIUM CHLORIDE AND DEXTROSE MONOHYDRATE: 5; 600; 310; 30; 20 INJECTION, SOLUTION INTRAVENOUS at 15:53

## 2024-07-29 RX ADMIN — CEFTAZIDIME 1 G: 1 INJECTION, POWDER, FOR SOLUTION INTRAMUSCULAR; INTRAVENOUS at 01:36

## 2024-07-29 RX ADMIN — HEPARIN SODIUM 5000 UNITS: 5000 INJECTION, SOLUTION INTRAVENOUS; SUBCUTANEOUS at 20:45

## 2024-07-29 RX ADMIN — DOXYCYCLINE 100 MG: 100 INJECTION, POWDER, LYOPHILIZED, FOR SOLUTION INTRAVENOUS at 17:48

## 2024-07-29 RX ADMIN — HEPARIN SODIUM 5000 UNITS: 5000 INJECTION, SOLUTION INTRAVENOUS; SUBCUTANEOUS at 14:59

## 2024-07-29 RX ADMIN — HEPARIN SODIUM 5000 UNITS: 5000 INJECTION, SOLUTION INTRAVENOUS; SUBCUTANEOUS at 06:41

## 2024-07-29 RX ADMIN — Medication 3 MG: at 20:45

## 2024-07-29 RX ADMIN — CEFEPIME 1 G: 1 INJECTION, POWDER, FOR SOLUTION INTRAMUSCULAR; INTRAVENOUS at 14:58

## 2024-07-29 ASSESSMENT — COGNITIVE AND FUNCTIONAL STATUS - GENERAL
MOVING TO AND FROM BED TO CHAIR: 3 - A LITTLE
STANDING UP FROM CHAIR USING ARMS: 2 - A LOT
TAKING CARE OF COMPLICATED TASKS: 1 - UNABLE
REMEMBERING WHERE THINGS ARE: 1 - UNABLE
MOVING TO AND FROM BED TO CHAIR: 3 - A LITTLE
AFFECT: CONFUSED
WALKING IN HOSPITAL ROOM: 2 - A LOT
MOVING TO AND FROM BED TO CHAIR: 3 - A LITTLE
REMEMBERING 5 ERRANDS WITH NO LIST: 1 - UNABLE
REMEMBERING TO TAKE MEDICATION: 1 - UNABLE
STANDING UP FROM CHAIR USING ARMS: 2 - A LOT
CLIMB 3 TO 5 STEPS WITH RAILING: 2 - A LOT
UNDERSTANDING 10 TO 15 MIN SPEECH: 2 - A LOT
WALKING IN HOSPITAL ROOM: 2 - A LOT
STANDING UP FROM CHAIR USING ARMS: 2 - A LOT
FOLLOWS FAMILIAR CONVERSATION: 2 - A LOT
WALKING IN HOSPITAL ROOM: 2 - A LOT
CLIMB 3 TO 5 STEPS WITH RAILING: 2 - A LOT
CLIMB 3 TO 5 STEPS WITH RAILING: 2 - A LOT

## 2024-07-29 NOTE — HOSPITAL COURSE
Samy is a 89 y.o. male admitted on 7/28/2024 with Lower urinary tract infection [N39.0]  Pleural effusion on left [J90]  Acute encephalopathy [G93.40]  Acute on chronic congestive heart failure, unspecified heart failure type (CMS/HCC) [I50.9]. Principal problem is Encephalopathy.    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    History of Present Illness  89-year-old male with history of HFrEF, HTN, prior diverticulitis status post colostomy, recurrent pleural effusions requiring multiple thoracentesis tubes CAD status post CABG who presented this admission with shortness of breath, confusion, and hallucinations over the last 2 weeks.      7/31 IR for thoracentesis

## 2024-07-29 NOTE — PROGRESS NOTES
Met with the patient and his son Ethan along with palliative care to discuss goals of care.    I provided an update on current medical problems and respective management plan, including IV antibiotics, maintenance fluids, and findings of aspiration on video swallow.    Patient and his son relayed to us that they have been aware of dysphasia and aspiration risk for a while. Patient has had video swallow tests done in the past and new imaging from today shows progression of known dysphasia with ongoing silent aspiration.    They have, in the past, discussed as a family that patient would not want his feeding restricted in the long-term. He seems to have good support from his family and community and he wants to enjoy what is remaining of his life, preferring quality over quantity.    For now the family wishes to continue with IV antibiotics to treat reversible causes of encephalopathy and continue to treat CALI as much as possible, however regarding feeds, they wish to pursue feeding for pleasure. They were made aware of high risk of aspiration/pneumonia, which could evolve into sepsis, respiratory failure, even death. They verbalized complete understanding of this and wished to pursue with comfort feeds. They would not want escalation of care beyond what we are already doing. The question of pursuing thoracentesis still stands, and Ethan said he will chat with the rest of his family before coming to a final decision.    Of note, Ethan has spoken to Marko (POA) about this tentative plan and he is in agreement. The family has yet to decide on full comfort care/hospice transition.        Arabella Miller MD  Internal medicine, PGY3  [phone]

## 2024-07-29 NOTE — CONSULTS
Palliative Care Consult      Patient Name: Samy Elena                                                                                        Patient MRN: [id number]  Date / Time Note Created: 7/29/2024 3:51 PM  Attending Physician: Rhoda Herrera MD  Referring Physician: No ref. provider found  Primary Care Physician: Zachery Hernandez MD   This is Hospital Day: 2    Conversation/Goals of Care:  Goal is to continue current restorative measures with QOL a high priority. Results of VFSS reviewed with aspiration of all consistencies. Pt and son Ethan understand the risk of eating including aspiration, hypoxia, PNA, and death. They wish for pt to eat for pleasure without restricting his diet. Family is amenable to ongoing GOC discussions and are considering palliative bridge w/ HBNP practice understanding pt can transition to hospice at any time.     Assessment/Plan  Shortness of breath  Assessment & Plan  -in the s/o HFrEF, chronic pleural effusion, high risk of aspiration    -aspiration precautions  -comfort feeds  -if pt declines contact family immediately and readdress GOC  -if family elects comfort care: dilaudid 0.25 mg IV every 2 hours PRN dyspnea    Debility  Assessment & Plan  - Debility likely multifactorial in the setting of multiple comorbid conditions, adv age, acute hospitalization for UTI vs PNA, now aspirating all consistencies on VFSS  - Resides at home with 24 hour care Baseline functional status: largely WC bound, able to take some steps w/ assistance  Frailty:advanced  - Pt remains high risk for further deterioration, functional decline and death  -PPS: Current 30 % Baseline unknown      Plan:  - Continue supportive interventions to treat reversible causes of illness  - Comfort feeds  - Revisit  GOC if pt declines  - home w palliative bridge and HBNP practice      Palliative care by specialist  Assessment & Plan  89 y.o. male with a PMH of HFrEF, diverticulitis s/p colostomy, chronic  pleural effusion, CAD s/p CABG who presents with 2 weeks of AMS  and SOB for 1 day. Pt is admitted for treatment of possible UTI, PNA, CALI. Pt underwent VFSS 7/29 which showed silent aspiration of all consistencies.    - Code status: DNR  - Prognosis:  patient meets hospice criteria  - Capacity for medical decision making: partial capacity to participate in medial decisions  - Advance Directives: not on file in EMR  - Goals of care: Goals are restorative with QOL and comfort feeding a high priority  - Surrogate Decision Maker:son Marko who collaborates with his brothers  -c/s spiritual care        Reason for Consult:  Assistance with clarification of goals of care    HPI      Source: chart review and the patient    Samy Elena is an 89 y.o. male with a PMH of HFrEF, TR, diverticulitis s/p colostomy, chronic pleural effusion, AF not on AC, history of Justin's gangrene, CAD s/p CABG who presents with 2 weeks of AMS with hallucinations and SOB for 1 day. Pt is admitted for treatment of possible UTI, PNA, CALI. Of note pt was treated as outpatient for UTI.  Pt underwent VFSS today which showed silent aspiration of of all consistencies. Compensations were not effective in clearing aspirated material.    Pt is awake and alert. No acute distress. He denies pain, chest pain, SOB, nausea and vomiting. Pt states he would like to eat.      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    Atrial fibrillation (CMS/HCC)     CHF (congestive heart failure) (CMS/[facility])     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[facility])     Coronary artery disease     Disease of thyroid gland     Will catheter in place     6/25 not at present    GI (gastrointestinal bleed)     Hypertension     Myocardial infarction (CMS/HCC)     Orthostatic hypotension     TIA (transient ischemic  attack)        Past Surgical History  Past Surgical History:   Procedure Laterality Date    CATARACT EXTRACTION W/ INTRAOCULAR LENS IMPLANT Left     CHOLECYSTECTOMY      COLOSTOMY      CORONARY ARTERY BYPASS GRAFT      JOINT REPLACEMENT Left     Knee    THYROIDECTOMY         Synagogue:  Pro[employer]    Review of Systems:  All other systems reviewed and negative except as noted in the HPI.    [facility] [place]  [facility] (Outside records) query reviewed with no concerns.    Current Medications:    calcium carbonate, 500 mg, oral, TID    cefepime, 1 g, intravenous, q12h INT **AND** [COMPLETED] Inpatient consult to Infectious Diseases, , , Once    clopidogreL, 75 mg, oral, q AM    cyanocobalamin, 500 mcg, oral, q AM    glucose, 16-32 g of dextrose, oral, PRN **OR** dextrose, 15-30 g of dextrose, oral, PRN **OR** glucagon, 1 mg, intramuscular, PRN **OR** dextrose 50 % in water (D50), 25 mL, intravenous, PRN    glucose, 16-32 g of dextrose, oral, PRN **OR** dextrose, 15-30 g of dextrose, oral, PRN **OR** glucagon, 1 mg, intramuscular, PRN **OR** dextrose 50 % in water (D50), 25 mL, intravenous, PRN    dextrose 5 % and lactated Ringer's, , intravenous, Continuous    doxycycline, 100 mg, intravenous, q12h INT    escitalopram, 5 mg, oral, q AM    famotidine, 10 mg, oral, Daily    gentamicin, 1 drop, Right Eye, q8h IVÁN    heparin (porcine), 5,000 Units, subcutaneous, q8h IVÁN    latanoprost, 1 drop, Left Eye, Nightly    levothyroxine, 100 mcg, oral, Daily (6:30a)    melatonin, 3 mg, oral, Nightly    methenamine, 1 g, oral, Daily before lunch    metoprolol succinate XL, 25 mg, oral, Daily with dinner    pantoprazole, 20 mg, oral, Daily    tamsulosin, 0.4 mg, oral, Daily    thiamine, 50 mg, oral, Daily    [Provider Managed Hold] torsemide, 20 mg, oral, BID    Allergies:  Allergies   Allergen Reactions    Jardiance [Empagliflozin] Other (see comments)     denise's gangrene         Objective    Physical Exam:     General:    No Acute Distress, frail appearing  Eyes:  ancteric sclera  ENMT:  Mucus Membranes Dry  CV:  Radial Pulses intact  Lungs:  Respirations unlabored  Abdomen:  Soft,NT,ND  Musculosketetal: No deformity  Psych:  Insight fair, not agitated or anxious  Neuro: awake and alert, speech clear  Skin:  Rash absent      Vitals:  Vitals:    07/29/24 1441   BP: (!) 101/55   Pulse: 71   Resp: 16   Temp: 36.5 °C (97.7 °F)   SpO2: 98%       Laboratory Studies:  CBC Results         07/29/24 07/28/24 07/02/24     0524 1229 1123    WBC 4.73 5.68 5.10    RBC 3.63 3.47 2.99    HGB 11.7 11.0 9.6    HCT 38.0 34.8 30.7    .7 100.3 102.7    MCH 32.2 31.7 32.1    MCHC 30.8 31.6 31.3     125 101          CMP Results         07/29/24 07/28/24 05/31/24     0524 1229 0924     138 143    K 4.2 5.2 4.7    Cl 104 106 108    CO2 22 22 27    Glucose 76 116 104    BUN 32 29 32    Creatinine 2.6 2.5 2.1    Calcium 9.1 9.4 8.5    Anion Gap 12 10 8    AST -- 42 --    ALT -- 7 --    Albumin -- 3.7 --    EGFR 22.4 23.5 29.0           Comment for K at 1229 on 07/28/24: Results obtained on plasma. Plasma Potassium values may be up to 0.4 mEQ/L less than serum values. The differences may be greater for patients with high platelet or white cell counts.    Comment for EGFR at 0524 on 07/29/24: Calculation based on the Chronic Kidney Disease Epidemiology Collaboration (CKD-EPI) equation refit without adjustment for race.    Comment for EGFR at 1229 on 07/28/24: Calculation based on the Chronic Kidney Disease Epidemiology Collaboration (CKD-EPI) equation refit without adjustment for race.    Comment for EGFR at 0924 on 05/31/24: Calculation based on the Chronic Kidney Disease Epidemiology Collaboration (CKD-EPI) equation refit without adjustment for race.          Troponin I Results         07/28/24 07/28/24 03/26/24     1534 1229 0012    HS Troponin I 23.2 22.9 41.7          ABG Results         07/28/24 08/12/22 08/11/22     1534 1104 3910     pH -- 7.48 7.47    pCO2 -- 33 33    pO2 -- 154 149    HCO3 -- 26.2 25.6    Base Excess -- 1.6 0.9    Source Of Oxygen ra Vent PS 8/6              Imaging and Other Studies:     FLUOROSCOPY VIDEO SWALLOW WITH SPEECH    Result Date: 7/29/2024  IMPRESSION: 1. Moderate oral phase. Slow lingual manipulation. Reduced bolus cohesion. Delayed AP transfers. Residuals seen to mostly clear with a subsequent swallow. 2. Profound pharyngeal phase. Delayed swallow initiation, pharyngeal dysmotility, mild to moderate residuals throughout the pharynx, worsened with thicker viscosities. There was SILENT aspiration of thin liquids, mildly thick liquids, moderately thick liquids AND puree solids. Although there was NO response to all aspiration events, the patient did demonstrate a very delayed cough following all events. This in conjunction with compensations trialed were not seen to be effective in ejecting/eliminating aspirated material. 3. Esophageal phase not assessed this date. Avril Nava PA-C was present for this examination. This study was reviewed by Dr. Goyo Burnham. The undersigned radiologist agrees only with the radiographic findings. Specific swallowing recommendations are solely the purview of the Speech Pathology Division. Arleth Schmitz M.S., CCC-SLP     CT CHEST ABDOMEN PELVIS WITHOUT IV CONTRAST    Result Date: 7/29/2024  IMPRESSION: The lack of intravenous contrast limits interpretation of the solid organs, vessels and certain processes. Moderate to large left pleural effusion with near-complete collapse left lower lobe again seen. Left upper lobe airspace disease with increased consolidation since 8/3/2023, due to either volume loss and/or pneumonia/pneumonitis. Unchanged small right layering pleural effusion. Enlarged prostate gland with findings of urinary bladder outlet obstruction again seen.  Correlate with recently performed urinalysis if there is concern for urinary tract infection. Additional  chronic/stable findings as described above.     CT HEAD WITHOUT IV CONTRAST    Result Date: 7/28/2024  IMPRESSION: No acute intracranial abnormalities. COMMENT: Computed tomography of the brain was performed at 3.0 mm intervals without intravenous contrast administration. The current study was compared to that dated August 3, 2023 There is no evidence of mass effect or shift of the midline structures. There is ventricular prominence and cortical sulcal prominence, consistent with involutional changes. There is no evidence of acute intracranial hemorrhage or acute cortical infarction. There are no extra-axial fluid collections. There is patchy periventricular white matter hypoattenuation, consistent with microangiopathic changes. The bony calvarium is intact. Imaged portions of the paranasal sinuses and mastoid air cells are clear and intact. CT DOSE:  One or more dose reduction techniques (e.g. automated exposure control, adjustment of the mA and/or kV according to patient size, use of iterative reconstruction technique) utilized for this examination.     X-RAY CHEST 1 VIEW    Result Date: 7/28/2024  IMPRESSION: Increasing left pleural effusion. COMMENT: The current portable study the chest was compared to that dated May 31, 2024 Further opacification of the left hemithorax is seen secondary to an increasing left pleural effusion. The lower half of the left hemithorax is now obscured. Additional volume loss is likely present in the right retrocardiac region. No right pleural effusion is seen There is no gross pulmonary vascular congestion or edema.                                                                  Cardiac Imaging    TRANSTHORACIC ECHO (TTE) LIMITED 03/28/2024    Interpretation Summary  Technically difficult study. EKG demonstrates sinus rhythm.    1. The left ventricular cavity is small in size with normal wall thickness and mildly reduced systolic function. Left ventricular ejection fraction is  45-50% by visual estimate. Low stroke volume. Abnormal septal motion due to RV compression. Diastolic function not assessed on this limited study.    2. The aortic valve is sclerotic and not well visualized, likely three-cusped. No aortic stenosis. No aortic regurgitation.    3. Normal sized aortic root. There is aortic root calcification. Normal sized ascending aorta.    4. Thickened mitral leaflets. Mild mitral annular calcification. Trace mitral regurgitation.    5. The left atrium is normal in size.    6. The right ventricle is severely dilated (indexed volume 106 mL/m^2) and apex forming. Right ventricular systolic function is severely reduced. Apical hypercontractility (Nunes's sign) present.    7. The right atrium is massively dilated.    8. There is a large coaptation gap of the tricuspid leaflets. Wide-open tricuspid regurgitation with single chamber physiology. Unable to estimated right ventricular pressure due to severity of tricuspid regurgitation.    9. The pulmonic valve is not well visualized. Trace pulmonic regurgitation.    10. The IVC is enlarged with less than 50% respiratory variation consistent with elevated right-sided filling pressures.    11. No pericardial effusion. Large left pleural effusion.    Compared to previous TTE from 8/4/2023, there is no significant change.           I have reviewed the patient's pertinent labs to the time of note.  Significant abnormals are :     Hgb 11.7 - anemia  Platelets 121 - thrombocytopenia  Creat    2.6-CALI/CKD    I have independently reviewed the pertinent imaging to the time of note and agree with reported results.  CXR reviewed independently, agree with reported results. No  AICD/ pacer noted.      I have independently reviewed the pertinent cardiac studies to the time of note and agree with reported results.  EKG reviewed: afib RBBB   Qtc 525      Thank you for the opportunity to participate in this patient's hospital plan of care.     Ailyn  Tomasz, [facility]  Office [phone]

## 2024-07-29 NOTE — PROGRESS NOTES
Patient:  Samy Elena  Location:  [place] [address]  MRN:  [id number]  Today's date:  7/29/2024    SLP Diagnosis  Acutely impacted oropharyngeal swallow iso AMS/UTI + known dysphagia hx and VFSSx3 completed with aspiration of thin liquids    Swallowing Recommendations   SLP Swallowing Recommendations - 07/29/24 0832       Diet Consistency Recommendations NPO     Medication Administration crushed with pureed;whole with pureed     Supervision Level for Intake 1:1 supervision needed     Instrumental Assessment Recommendations VFSS (videofluroscopic swallowing study)     Recommend VFSS (Comment) Further recs pending VFSS later this date     Oral Hygiene twice daily teeth brushing                     Summary/Handoff  Dysphagia evaluation completed.  89-year-old male with history of HFrEF, HTN, prior diverticulitis status post colostomy, recurrent pleural effusions requiring multiple thoracentesis tubes CAD status post CABG who presented this admission with shortness of breath, confusion, and hallucinations over the last 2 weeks.  Patient/family initially thought that this was secondary to UTI, however, there was no change in his symptoms. Patient known with prior SLP history, last seen on 3/28/2024 with recommendations to pursue regular solids/thin liquid diet.  Patient with x 3 VFSS completed previously, most recently on 1/5/2023 with aspiration of thin liquids via straw observed.  When utilized with a chin tuck, there was a sensory response to aspirated material.  No aspiration was observed with single cup sips of thin liquids, mildly thick liquids and solids.  Patient now readmitted with 2 weeks of altered mental status, thought to be metabolic encephalopathy, and shortness of breath as well as recent UTI dx. SLP consulted to assess swallowing safety. Pt CT CHEST w/ near-complete collapse of LLL iso mod-large L PE, unchanged R layering PE. Patient resting on RA with stable WBC.  Patient  demonstrating concerns for aspiration at the bedside with findings of wet VQ, throat clearing following the swallow and likely multiple swallows per bolus. 3oz water protocol failed. RSST unable to be completed 2/2 cognitive deficits. SLP to continue to follow with VFSS later this date to greater analyze swallow physiology and determine candidacy for oral diet advancement.    Active Diet Orders (From admission, onward)       Start     Ordered    07/28/24 1955  NPO Diet  Diet effective now         07/28/24 1954                    Samy is a 89 y.o. male admitted on 7/28/2024 with Lower urinary tract infection [N39.0]  Pleural effusion on left [J90]  Acute encephalopathy [G93.40]  Acute on chronic congestive heart failure, unspecified heart failure type (CMS/HCC) [I50.9]. Principal problem is Encephalopathy.    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    History of Present Illness  89-year-old male with history of HFrEF, HTN, prior diverticulitis status post colostomy, recurrent pleural effusions requiring multiple thoracentesis tubes CAD status post CABG who presented this admission with shortness of breath, confusion, and hallucinations over the last 2 weeks.      SLP Pain      Date/Time Pain Type Rating: Rest Rating: Activity [facility]   07/29/24 0832 Pain Assessment 0 - no pain 0 - no pain [facility]            Prior Living Environment      Flowsheet Row Most Recent Value   People in Home other (see comments)   Current Living Arrangements home   Home Accessibility ramp to enter home          Prior Level of Function      Flowsheet Row Most Recent Value    Communication understands/communicates without difficulty   Swallowing difficulty swallowing liquids/foods   Baseline Diet/Method of Nutritional Intake thin liquids, regular  [however known to aspirate with thins via straw]   Past History of Dysphagia Previous SLP history, last seen on 3/28/2024 with recommendations to pursue regular solids/thin liquid diet.  Patient with x 3 VFSS completed previously, most recently on 1/5/2023 with aspiration of thin liquids via straw observed.  When utilized with a chin tuck, there was a sensory response.  No aspiration was observed with single cup sips of thin liquids, mildly thick liquids and solids.  Patient now readmitted with 2 weeks of altered mental status and shortness of breath as well as recent UTI dx.   Assistive Device Currently Used at Home wheelchair, walker, standard, scale             SLP Evaluation and Treatment - 07/29/24 0832          SLP Time Calculation    Start Time 0832     Stop Time 0850     Time Calculation (min) 18 min        General Information    Document Type Initial Evaluation     Mode of Treatment speech language pathology     Position at Start of Session upright;in bed     Status at Start of Session no issues or concerns identified by nurse prior to session;agreeable to therapy        Precautions/Limitations/Impairments    Existing Precautions/Restrictions aspiration;fall     Limitations/Impairments safety/cognitive;swallowing        Cognition/Psychosocial    Affect/Mental Status confused     Orientation Status oriented to;person   disoriented to place, time, year, SLP provided reorientation to all concepts    Follows Commands follows one-step commands;repetition of directions required;verbal cues/prompting required        Dentition (Oral Motor)    Dentition (Oral Motor) natural dentition        Facial Symmetry (Oral Motor)    Facial Symmetry (Oral Motor) WNL        Lip Function (Oral Motor)    Lip Range of Motion (Oral Motor) WNL     Lip Strength  "(Oral Motor) WNL     Lip Sensitivity (Oral Motor) intact        Tongue Function (Oral Motor)    Tongue ROM (Oral Motor) WNL     Tongue Strength (Oral Motor) WNL     Tongue Sensitivity (Oral Motor) intact        Jaw Function (Oral Motor)    Jaw Function (Oral Motor) WNL        Cough/Swallow/Gag Reflex (Oral Motor)    Volitional Throat Clear/Cough (Oral Motor) reduced strength     Volitional Swallow (Oral Motor) mildly delayed        Vocal Quality/Secretion Management (Oral Motor)    Vocal Quality (Oral Motor) hoarse   patient decribed it as \"Raspy\"    Secretion Management (Oral Motor) WNL        GRBAS    Grade 2-->moderate abnormality     Roughness 2-->moderate abnormality     Breathiness 0-->none     Asthenia 1-->slight abnormality     Strain 0-->none        Functional Communication Measures    FCM: Swallowing 3-->Level 3        General Swallowing Observations    Current Diet/Method of Nutritional Intake NPO     Signs/Symptoms of Aspiration (Current Diet) none     Respiratory Support (General Swallowing Observations) none        Food and Liquid Trials (NIS)    Patient Positioning HOB elevated (specify degrees)     Oral Intake/Feeding Performance oral trials administered by therapist     Liquid Consistencies Evaluated thin liquids     Thin Liquids single cup sips;straw sips     Comment, Thin Liquids patient known to aspirate with straw sips, with cup sips, patient demontrated delayed throat clearing after intake with wet VQ, HIGH concerns for aspiration, 3oz water protocol FAILED, unable to complete RSST 2/2 cognitive deficits     Food Consistencies Evaluated pureed (PU4)     Pureed (PU4) use of teaspoon/fork     Comment, Pureed (PU4) slow manipulation and mastication despite soft texture- delayed AP transit with suspected multiple swallows per bolus, would benefit from VFSS to greater analyze swallow physiology and determine safest diet level     Pharyngeal Phase of Swallow delayed swallow reflex initiation;multiple " swallows per bolus;throat clearing noted after the swallow     Esophageal Phase of Swallow no clinical symptoms        Swallowing Intervention    Dysphagia/Swallowing Interventions monitor tolerance of;current diet without evidence of aspiration;advanced diet/liquid texture trials        AM-PAC™ - Cognition (Current Function)    Following/understanding a 10-15 minute speech or presentation? 2 - A lot     Understanding familiar people during ordinary conversations? 2 - A lot     Remembering to take medications at the appropriate time? 1 - Unable     Remembering where things were placed or put away? 1 - Unable     Remembering a list of 3 or 4 errands without writing it down? 1 - Unable     Taking care of complicated tasks? 1 - Unable     AM-PAC™ Cognition Score 8        SLP Goals    Swallowing Goal Selection oral nutrition/hydration, SLP Goal 1        Session Outcome    Position at End of Session in bed;upright     Status at End of Session all needs met;call light in reach;personal items in reach     Nursing Notified patient's position;patient's performance;patient's response to therapy/activity        Plan    Rehab Potential good, to achieve stated therapy goals     Therapy Frequency 4 times/wk     Planned Therapy Interventions dysphagia therapy;instrumental swallow assessment                            Education Documentation  Signs/Symptoms, taught by Arleth Schmitz CCC-SLP at 7/29/2024 12:11 PM.  Learner: Patient  Readiness: Acceptance  Method: Explanation  Response: Verbalizes Understanding    Risk Factors, taught by Arleth Schmitz CCC-SLP at 7/29/2024 12:11 PM.  Learner: Patient  Readiness: Acceptance  Method: Explanation  Response: Verbalizes Understanding          SLP Goals      Flowsheet Row Most Recent Value   Oral Nutrition/Hydration Goal 1    Activity effective/safe, management of texture/viscosity at 07/29/2024 0832   Time Frame by discharge at 07/29/2024 0832   Progress/Outcome goal  ongoing at 07/29/2024 0888

## 2024-07-29 NOTE — CONSULTS
Infectious Disease Consult Note    Patient Name: Samy Elena  MR#: [id number]  : 10/6/1930  Admission Date: 2024  Consult Date: 24 7:31 AM   Consultant: Josh Foote DO    Reason for Consult: c/f undertreatd UTI, no MDRO on prior cx. here with AMS typical of prior UTIs  Referring Provider: Dr. Rhoda Herrera      History of Present Illness     Patient is a 89 yo M hx HFrEF, nonrheumatic TR, A-fib not on AC due to GIB, HTN, prior diverticulitis s/p colostomy, recurrent pleural effusions requiring multiple thoracenteses, prior Denise's, CAD s/p CABG who presented to care for shortness of breath.    Patient has reportedly been experiencing confusion and hallucinations over the last 2 weeks.  Family initially believed that this was secondary to a urinary tract infection and he was treated with Cipro however there is no change in his symptoms (they report an over-the-counter test from [facility] which stated that the patient had a UTI), therefore he was started on Bactrim.  On the day prior to admission, he began experiencing increased shortness of breath. At bedside, HPI is limited.  He reports that he has had shortness of breath for the last year. He reports that he had a cough that is recently improved.  denied any fevers, chills, pain on urination, nausea.  Does report intermittent frequency of urination over the last year. does report 1 episode of vomiting.  On admission, patient's son had reported that the patient's mental status was back to baseline.    Outside records were reviewed.    Discussed care with MA.     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    Atrial fibrillation (CMS/HCC)     CHF (congestive heart failure) (CMS/formerly [facility])     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formerly [facility])     Coronary artery  disease     Disease of thyroid gland     Will catheter in place     6/25 not at present    GI (gastrointestinal bleed)     Hypertension     Myocardial infarction (CMS/HCC)     Orthostatic hypotension     TIA (transient ischemic attack)        Surgical History:   Past Surgical History:   Procedure Laterality Date    CATARACT EXTRACTION W/ INTRAOCULAR LENS IMPLANT Left     CHOLECYSTECTOMY      COLOSTOMY      CORONARY ARTERY BYPASS GRAFT      JOINT REPLACEMENT Left     Knee    THYROIDECTOMY         Social History:   Social History     Socioeconomic History    Marital status:    Tobacco Use    Smoking status: Never    Smokeless tobacco: Never   Vaping Use    Vaping Use: Never used   Substance and Sexual Activity    Alcohol use: Not Currently     Comment: social    Drug use: Never    Sexual activity: Defer     Social Determinants of Health     Financial Resource Strain: Low Risk  (8/4/2023)    Overall Financial Resource Strain (CARDIA)     Difficulty of Paying Living Expenses: Not hard at all   Food Insecurity: No Food Insecurity (7/28/2024)    Hunger Vital Sign     Worried About Running Out of Food in the Last Year: Never true     Ran Out of Food in the Last Year: Never true   Transportation Needs: No Transportation Needs (3/27/2024)    PRAPARE - Transportation     Lack of Transportation (Medical): No     Lack of Transportation (Non-Medical): No   Housing Stability: Low Risk  (3/27/2024)    Housing Stability Vital Sign     Unable to Pay for Housing in the Last Year: No     Number of Places Lived in the Last Year: 1     Unstable Housing in the Last Year: No          Family History: Congenital heart disease Mother Mrs. Elena  Cancer Father Mr. Elena  Breast cancer Sister  Diabetes Brother     Travel Exposure:   Travel and Exposure Screening      Flowsheet Row Most Recent Value   Travel Screening    Overnight hospitalization outside the [place] in the last year? No Filed On: 07/28/2024 1637   Exposure Screening     Symptoms             Animal Exposure:     Review of Systems    12-point ROS performed and negative unless otherwise noted in HPI    Medications:    Current IP Meds (From admission, onward)          Frequency     methenamine (HIPREX) tablet 1 g         Daily before lunch     levothyroxine (SYNTHROID) tablet 100 mcg         Daily (6:30a)     cefTAZidime (FORTAZ) 1 g/100 mL NSS IVPB 1 g         Every 24 hours interval     heparin (porcine) 5,000 unit/mL injection 5,000 Units  (Assessed at increased VTE risk (Padua score greater than or equal to 4))  Status:  Discontinued         Every 8 hours     latanoprost (XALATAN) 0.005 % ophthalmic solution 1 drop         Nightly     melatonin tablet 3 mg         Nightly     gentamicin (GARAMYCIN) 0.3 % ophthalmic solution 1 drop         Every 8 hours     heparin (porcine) 5,000 unit/mL injection 5,000 Units  (Assessed at increased VTE risk (Padua score greater than or equal to 4))         Every 8 hours     calcium carbonate (TUMS) chewable tablet 500 mg         3 times daily     [Provider Managed Hold]  torsemide (DEMADEX) tablet 20 mg        (On hold since yesterday at 1930 until manually unheld; held by Arabella Miller MDHold Reason: Awaiting lab resultsHold Comments: .)   On hold since yesterday at 1930 until manually unheld   Hold reason: Awaiting lab results, Hold comment: .    2 times daily     glucose chewable tablet 16-32 g of dextrose  (Hypoglycemia Treatment Protocol and Hyperglycemia Validation Protocol)        See Hyperspace for full Linked Orders Report.    As needed     dextrose 40 % oral gel 15-30 g of dextrose  (Hypoglycemia Treatment Protocol and Hyperglycemia Validation Protocol)        See Hyperspace for full Linked Orders Report.    As needed     glucagon (GLUCAGEN) injection 1 mg  (Hypoglycemia Treatment Protocol and Hyperglycemia Validation Protocol)        See Hyperspace for full Linked Orders Report.    As needed     dextrose 50 % in water (D50)  injection 12.5 g  (Hypoglycemia Treatment Protocol and Hyperglycemia Validation Protocol)        See Hyperspace for full Linked Orders Report.    As needed     clopidogreL (PLAVIX) tablet 75 mg         Every morning     cyanocobalamin (VITAMIN B12) tablet 500 mcg         Every morning     escitalopram (LEXAPRO) tablet 5 mg         Every morning     famotidine (PEPCID) tablet 10 mg         Daily     metoprolol succinate XL (TOPROL-XL) 24 hr ER tablet 25 mg         Daily with dinner     pantoprazole (PROTONIX) tablet,delayed release (DR/EC) 20 mg         Daily     thiamine (VITAMIN B1) tablet 50 mg         Daily     glucose chewable tablet 16-32 g of dextrose  (Hypoglycemia Treatment Protocol and Hyperglycemia Validation Protocol)        See Hyperspace for full Linked Orders Report.    As needed     dextrose 40 % oral gel 15-30 g of dextrose  (Hypoglycemia Treatment Protocol and Hyperglycemia Validation Protocol)        See Hyperspace for full Linked Orders Report.    As needed     glucagon (GLUCAGEN) injection 1 mg  (Hypoglycemia Treatment Protocol and Hyperglycemia Validation Protocol)        See Hyperspace for full Linked Orders Report.    As needed     dextrose 50 % in water (D50) injection 12.5 g  (Hypoglycemia Treatment Protocol and Hyperglycemia Validation Protocol)        See Hyperspace for full Linked Orders Report.    As needed     cefTAZidime (FORTAZ) 1 g/100 mL NSS IVPB 1 g         Once     furosemide (LASIX) injection 60 mg         Once     cefTRIAXone (ROCEPHIN) IVPB 1 g in 100 mL NSS vial in bag  Status:  Discontinued         Once            Anti-infectives (From admission, onward)      Start     Dose/Rate Route Frequency Ordered Stop    07/29/24 1130  methenamine (HIPREX) tablet 1 g         1 g oral Daily before lunch 07/28/24 1930 07/29/24 0000  cefTAZidime (FORTAZ) 1 g/100 mL NSS IVPB 1 g         1 g  200 mL/hr over 30 Minutes intravenous Every 24 hours interval 07/28/24 1932 07/28/24 2200   "gentamicin (GARAMYCIN) 0.3 % ophthalmic solution 1 drop         1 drop Right Eye Every 8 hours 240                Objective     Vital Signs:    Patient Vitals for the past 72 hrs:   BP Temp Temp src Pulse Resp SpO2 Height Weight   24 0514 -- -- -- -- -- -- -- 70.5 kg (155 lb 8 oz)   24 0300 126/65 36.7 °C (98 °F) Oral 72 16 98 % -- --   24 0000 116/64 36.6 °C (97.9 °F) Oral 70 19 96 % 1.854 m (6' 1\") 69.2 kg (152 lb 9.6 oz)   24 2215 -- -- -- 72 15 -- -- --   24 2200 (!) 124/59 -- -- 68 (!) 30 -- -- --   24 2100 (!) 107/59 -- -- 72 14 -- -- --   24 2000 (!) 101/59 -- -- 67 15 98 % -- --   24 1900 124/60 -- -- 68 16 -- -- --   24 1700 128/72 -- -- 62 17 98 % -- --   24 1600 131/68 -- -- 61 17 98 % -- --   24 1228 -- -- -- 64 16 97 % -- --   24 1223 133/68 36.7 °C (98 °F) Oral 63 16 97 % 1.854 m (6' 1\") 70.3 kg (155 lb)       Temp (72hrs), Av.7 °C (98 °F), Min:36.6 °C (97.9 °F), Max:36.7 °C (98 °F)      Physical Exam:    Visit Vitals  /65 (BP Location: Left upper arm, Patient Position: Lying)   Pulse 72   Temp 36.7 °C (98 °F) (Oral)   Resp 16   Ht 1.854 m (6' 1\")   Wt 70.5 kg (155 lb 8 oz)   SpO2 98%   BMI 20.52 kg/m²           General: non toxic, alert  HEENT: NC/AT/ anicteric sclera  Neck: supple, no meningismus  Cardiovascular: S1/S2 present.  No murmur, rub or gallop  Respiratory: clear to auscultation  GI/Abdomen: soft, NT/ND,  no peritoneal signs, +colostomy, no sacral wound  : +condom cath  Extremities: no clubbing, cyanosis, or edema  JOINTS:  No swelling, erythema, or pain  Lymphatics: no lymphadenopathy  Neurology: no focal deficits, hard of heraing  Psych: cooperative with exam  Skin: no rashes    Lines, Drains, Airways, Wounds:  Peripheral IV (Adult) Anterior;Distal;Left Forearm (Active)   Number of days:        External Urinary Catheter Medium (Active)   Number of days: 1       Labs:    CBC Results         " 07/28/24 07/02/24 05/31/24     1229 1123 0924    WBC 5.68 5.10 4.28    RBC 3.47 2.99 3.15    HGB 11.0 9.6 10.1    HCT 34.8 30.7 32.0    .3 102.7 101.6    MCH 31.7 32.1 32.1    MCHC 31.6 31.3 31.6     101 110          CMP Results         07/28/24 05/31/24 05/03/24     1229 0924 0900     143 142    K 5.2 4.7 4.1    Cl 106 108 108    CO2 22 27 26    Glucose 116 104 158    BUN 29 32 33    Creatinine 2.5 2.1 1.7    Calcium 9.4 8.5 8.6    Anion Gap 10 8 8    AST 42 -- --    ALT 7 -- --    Albumin 3.7 -- --    EGFR 23.5 29.0 37.4           Comment for K at 1229 on 07/28/24: Results obtained on plasma. Plasma Potassium values may be up to 0.4 mEQ/L less than serum values. The differences may be greater for patients with high platelet or white cell counts.    Comment for EGFR at 1229 on 07/28/24: Calculation based on the Chronic Kidney Disease Epidemiology Collaboration (CKD-EPI) equation refit without adjustment for race.    Comment for EGFR at 0924 on 05/31/24: Calculation based on the Chronic Kidney Disease Epidemiology Collaboration (CKD-EPI) equation refit without adjustment for race.    Comment for EGFR at 0900 on 05/03/24: Calculation based on the Chronic Kidney Disease Epidemiology Collaboration (CKD-EPI) equation refit without adjustment for race.            Microbiology Results       Procedure Component Value Units Date/Time    SARS-COV-2 (COVID-19)/ FLU A/B, AND RSV, PCR Nasopharynx [[id number]]  (Normal) Collected: 07/28/24 1320    Specimen: Nasopharyngeal Swab from Nasopharynx Updated: 07/28/24 1425     SARS-CoV-2 (COVID-19) Negative     Influenza A Negative     Influenza B Negative     Respiratory Syncytial Virus Negative    Narrative:      Testing performed using real-time PCR for detection of COVID-19. EUA approved validation studies performed on site.             Imaging:    Radiology Imaging    XR CHEST 1 VW    Narrative  CLINICAL HISTORY: Shortness of breath    Impression  IMPRESSION:  Increasing left pleural effusion.    COMMENT: The current portable study the chest was compared to that dated May 31,  2024    Further opacification of the left hemithorax is seen secondary to an increasing  left pleural effusion. The lower half of the left hemithorax is now obscured.    Additional volume loss is likely present in the right retrocardiac region. No  right pleural effusion is seen    There is no gross pulmonary vascular congestion or edema.        Recent Results (from the past 168 hour(s))   X-RAY CHEST 1 VIEW    Collection Time: 07/28/24 12:49 PM    Narrative    CLINICAL HISTORY: Shortness of breath      Impression    IMPRESSION: Increasing left pleural effusion.    COMMENT: The current portable study the chest was compared to that dated May 31,  2024    Further opacification of the left hemithorax is seen secondary to an increasing  left pleural effusion. The lower half of the left hemithorax is now obscured.    Additional volume loss is likely present in the right retrocardiac region. No  right pleural effusion is seen    There is no gross pulmonary vascular congestion or edema.   CT HEAD WITHOUT IV CONTRAST    Collection Time: 07/28/24  2:14 PM    Narrative    CLINICAL HISTORY: Mental status change.      Impression    IMPRESSION: No acute intracranial abnormalities.    COMMENT: Computed tomography of the brain was performed at 3.0 mm intervals  without intravenous contrast administration.    The current study was compared to that dated August 3, 2023    There is no evidence of mass effect or shift of the midline structures.    There is ventricular prominence and cortical sulcal prominence, consistent with  involutional changes.    There is no evidence of acute intracranial hemorrhage or acute cortical  infarction.    There are no extra-axial fluid collections.    There is patchy periventricular white matter hypoattenuation, consistent with  microangiopathic changes.    The bony calvarium is  "intact. Imaged portions of the paranasal sinuses and  mastoid air cells are clear and intact.    CT DOSE:  One or more dose reduction techniques (e.g. automated exposure  control, adjustment of the mA and/or kV according to patient size, use of  iterative reconstruction technique) utilized for this examination.           Assessment  89 yo M hx HFrEF, nonrheumatic TR, A-fib not on AC due to GIB, HTN, prior diverticulitis s/p colostomy, recurrent pleural effusions requiring multiple thoracenteses, prior Justin's, CAD s/p CABG who presented to care for shortness of breath.    ID-related problems    Encephalopathy in setting of shortness of breath and C/F obstruction v UTI v PNA-VSS.  WBC 5.  UA WBC 35-50.  Encephalopathy was reported as his \"symptom\" for a urinary tract infection. CT A/P w/ L effusion and PRINCESS consolidation.  Prior UCX 11/2022 with relatively susceptible K pneumo and Pseudomonas. I am not convinced of true UTI.   SOB- improved post diuresis. Cxray w/ increased size of L pleural effusion    Recommendations    Stop ceftazidime and start cefepime 1g q12 (CrCl 18 07/29)  Would consult urology given c/f component of urinary obstruction in setting of BPH.   Low threshold for repeat L thora. If performed, please obtain fluid analysis, aerobic/anaerobic, AFB, fungal cx.   Please obtain MRSA nares and if develops productive cough, would obtain sputum cx  Please obtain EKG and if Qtc<500, would start azithromycin 500mg q24H. If Qtc>500, would start doxycycline 100mg BID for coverage of possible atypical PNA.   Fu UCx    Thank you for this consult. ID will continue to follow. Please call or page with any questions/concerns.    Josh Foote DO  [facility]      Greater than 55 minutes were spent obtaining a detailed interval history, detailed exam, evaluating this high complexity case with significant medical decision making including coordination of care,reviewing laboratory and " imaging data and discussion withwith primary team and consultants        NOTE: Some or all of the note above was created with the use of dictation software, please note this dictation software can have anomalies in its ability to transcribe. Please contact me directly for anything that seems abnormal for clarification

## 2024-07-29 NOTE — PLAN OF CARE
Care Coordination Admission Assessment Note    General Information:  Readmission Within the last 30 days: no previous admission in last 30 days  Does patient have a : No  Patient-Specific Goals (include timeframe):      Living Arrangements:  Arrived From: home  Current Living Arrangements: home  People in Home: other (see comments)  Home Accessibility: ramp to enter home  Living Arrangement Comments: pt lives in a 2 story home with a ramp to enter, first floor set up, has a private pay 24/7 aide    Housing Stability and Utility Access (SDOH):  In the last 12 months, was there a time when you were not able to pay the mortgage or rent on time?: No  In the last 12 months, how many places have you lived?:    In the last 12 months, was there a time when you did not have a steady place to sleep or slept in a shelter (including now)?: No  In the past 12 months has the electric, gas, oil, or water company threatened to shut off services in your home?:      Functional Status Prior to Admission:   Assistive Device/Animal Currently Used at Home: wheelchair, walker, standard, scale  Functional Status Comments:    IADL Comments:       Supports and Services:  Current Outpatient/Agency/Support Group: none  Type of Current Home Care Services:    History of home care episode or rehab stay:      Discharge Needs Assessment:   Concerns to be Addressed: discharge planning, care coordination/care conferences  Current Discharge Risk:    Anticipated Changes Related to Illness: none    Patient/Family Anticipated Discharge Plan:  Patient/Family Anticipates Transition To: home with help/services  Patient/Family Anticipated Services at Transition: home health care    Connection to Community  Patient declined offered resources.    Patient Choice:   Offered/Gave Vendor List: yes  Patient's Choice of Community Agency(s): [facility]  Patient and/or patient guardian/advocate was made aware of their right to choose a provider. A list of  eligible providers was presented and reviewed with the patient and/or patient guardian/advocate in written and/or verbal form. The list delineates providers in the patient’s desired geographic area who are participating in the Medicare program and/or providers contracted with the patient’s primary insurance. The Medicare list and quality ratings were obtained from the Medicare.gov [medicare.gov] website.    Anticipated Discharge Plan:  Met with patient. Provided education and contact information for Care Coordination services.: yes  Anticipated Discharge Disposition: home with home health  Type of Home Care Services: home PT, home OT, nursing    Transportation Needs (SDOH):  Transportation Concerns:    Transportation Anticipated: family or friend will provide  Is Out of Hospital DNR needed at discharge?: no    In the past 12 months, has lack of transportation kept you from medical appointments or from getting medications?: No  In the past 12 months, has lack of transportation kept you from meetings, work, or from getting things needed for daily living?: No    Concerns - comments: s/w pt son to complete A- home with [facility], family will provide transport home

## 2024-07-29 NOTE — CONSULTS
"Subjective     Patient is 89 y.o. 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     Patient presented to the ED after 1-2 weeks of altered mental status due to presumed UTI. Patient was started on outpatient Bactrim and Ciprofloxacin w/o culture, to which mental status did not improve. He also started to experience SOB yesterday 7/28 which is when his son brought him to the ED to be evaluated. Patient was found altered on examination w/ normal O2 sat, in CALI, and with 1+ Leukocyte Esterase w/ 35-50 WBCs/hpf. Patient's son mentions he presented similar with past UTIs.      Patient states he is \"slightly chilly\" and hungry. Denies fever, chills, SOB, chest pain, abdominal pain, nausea, vomiting, changes in bowel habits, changes in urinary frequency or amount, dysuria, or hematuria. Torsemide held on arrival. ID placed patient on Cefepime in addition to Methenamine. Urology consulted to further assess possibility of obstruction.    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)     Coronary artery disease     Disease of thyroid gland     Will catheter in place     6/25 not at present    GI (gastrointestinal bleed)     Hypertension     Myocardial infarction (CMS/HCC)     Orthostatic hypotension     TIA (transient ischemic attack)        Surgical History:   Past Surgical History:   Procedure Laterality Date    CATARACT EXTRACTION W/ INTRAOCULAR LENS IMPLANT Left     CHOLECYSTECTOMY      COLOSTOMY      CORONARY ARTERY BYPASS GRAFT      JOINT REPLACEMENT Left     Knee    THYROIDECTOMY         Social History:   Social History     Social History Narrative    Not on file       Family " History: No family history on file.    Allergies: Jardiance [empagliflozin]    Current Facility-Administered Medications   Medication Dose Route Frequency Provider Last Rate Last Admin    calcium carbonate (TUMS) chewable tablet 500 mg  500 mg oral TID Arabella Miller MD        ceFEPIme (MAXIPIME) 1 g in 100 mL NSS vial in bag  1 g intravenous q12h INT Rhoda Herrera MD   Stopped at 07/29/24 1544    clopidogreL (PLAVIX) tablet 75 mg  75 mg oral q AM Arabella Miller MD   75 mg at 07/28/24 2259    cyanocobalamin (VITAMIN B12) tablet 500 mcg  500 mcg oral q AM Arabella Miller MD   500 mcg at 07/28/24 2259    glucose chewable tablet 16-32 g of dextrose  16-32 g of dextrose oral PRN Arabella Miller MD        Or    dextrose 40 % oral gel 15-30 g of dextrose  15-30 g of dextrose oral PRN Arabella Miller MD        Or    glucagon (GLUCAGEN) injection 1 mg  1 mg intramuscular PRN Arabella Miller MD        Or    dextrose 50 % in water (D50) injection 12.5 g  25 mL intravenous PRN Arabella Miller MD        glucose chewable tablet 16-32 g of dextrose  16-32 g of dextrose oral PRN Arabella Miller MD        Or    dextrose 40 % oral gel 15-30 g of dextrose  15-30 g of dextrose oral PRN Arabella Miller MD        Or    glucagon (GLUCAGEN) injection 1 mg  1 mg intramuscular PRN Arabella Miller MD        Or    dextrose 50 % in water (D50) injection 12.5 g  25 mL intravenous PRN Arabella Miller MD        dextrose 5 % and lactated Ringer's infusion   intravenous Continuous Arabella Miller MD 80 mL/hr at 07/29/24 1553 New Bag at 07/29/24 1553    doxycycline (VIBRAMYCIN) 100 mg in sodium chloride 0.9 % 100 mL IVPB  100 mg intravenous q12h INT Arabella Miller MD        escitalopram (LEXAPRO) tablet 5 mg  5 mg oral q AM Arabella Miller MD   5 mg at 07/28/24 2258    famotidine (PEPCID) tablet 10 mg  10 mg oral Daily Arabella Miller MD   10 mg at 07/28/24 3163     gentamicin (GARAMYCIN) 0.3 % ophthalmic solution 1 drop  1 drop Right Eye q8h [facility] Arabella Miller MD        heparin (porcine) 5,000 unit/mL injection 5,000 Units  5,000 Units subcutaneous q8h [facility] Arabella Miller MD   5,000 Units at 07/29/24 1459    latanoprost (XALATAN) 0.005 % ophthalmic solution 1 drop  1 drop Left Eye Nightly Arabella Miller MD        levothyroxine (SYNTHROID) tablet 100 mcg  100 mcg oral Daily (6:30a) Arabella Miller MD   100 mcg at 07/29/24 0641    melatonin tablet 3 mg  3 mg oral Nightly Arabella Miller MD   3 mg at 07/28/24 2258    methenamine (HIPREX) tablet 1 g  1 g oral Daily before lunch Arabella Miller MD        metoprolol succinate XL (TOPROL-XL) 24 hr ER tablet 25 mg  25 mg oral Daily with dinner Arabella Miller MD        pantoprazole (PROTONIX) tablet,delayed release (DR/EC) 20 mg  20 mg oral Daily Arabella Miller MD   20 mg at 07/28/24 2258    tamsulosin (FLOMAX) 24 hr ER capsule 0.4 mg  0.4 mg oral Daily Arabella Miller MD        thiamine (VITAMIN B1) tablet 50 mg  50 mg oral Daily Arabella Miller MD   50 mg at 07/28/24 2259    [Provider Managed Hold] torsemide (DEMADEX) tablet 20 mg  20 mg oral BID Arabella Miller MD            Medication List        ASK your doctor about these medications      calcium carbonate 200 mg calcium (500 mg) chewable tablet  Commonly known as: TUMS  Take 1 tablet by mouth 3 (three) times a day.  Dose: 1 tablet     clopidogreL 75 mg tablet  Commonly known as: PLAVIX  Take 75 mg by mouth every morning.  Dose: 75 mg     cyanocobalamin 500 mcg tablet  Commonly known as: VITAMIN B12  Take 500 mcg by mouth every morning.  Dose: 500 mcg     escitalopram 5 mg tablet  Commonly known as: LEXAPRO  Take 5 mg by mouth every morning.  Dose: 5 mg     famotidine 10 mg tablet  Commonly known as: PEPCID  Take 1 tablet (10 mg total) by mouth daily Indications: gastroesophageal reflux disease.  Dose: 10 mg      levothyroxine 100 mcg tablet  Commonly known as: SYNTHROID  Take 100 mcg by mouth daily.  Dose: 100 mcg  Ask about: Which instructions should I use?     lidocaine 4 % adhesive patch,medicated topical patch  Commonly known as: ASPERCREME  Apply 1 patch topically daily as needed. Remove & discard patch within 12 hours or as directed by prescriber.  Dose: 1 patch     LOTEMAX 0.5 % ophthalmic suspension  Administer 1 drop into the right eye 4 (four) times a day.  Dose: 1 drop  Generic drug: loteprednol  Ask about: Which instructions should I use?     LUMIGAN 0.01 % ophthalmic drops  Administer 1 drop into the left eye nightly.  Dose: 1 drop  Generic drug: bimatoprost     magnesium oxide 400 mg (241.3 mg magnesium) tablet  Commonly known as: MAG-OX  Take 200 mg by mouth 4 (four) times a day. Difficult to swallow at 400 mg, so son found 200 mg. So taking 4 times a day.  Dose: 200 mg     melatonin 3 mg tablet  Take 3 mg by mouth nightly.  Dose: 3 mg     methenamine 1 gram tablet  Commonly known as: HIPREX  Take 1 g by mouth daily before lunch.  Dose: 1 g     metoprolol succinate XL 25 mg 24 hr tablet  Commonly known as: TOPROL-XL  Take 25 mg by mouth daily with dinner.  Dose: 25 mg  Ask about: Which instructions should I use?     omeprazole 20 mg capsule  Commonly known as: PriLOSEC  Take 20 mg by mouth daily with lunch.  Dose: 20 mg     potassium chloride 10 mEq CR tablet  Commonly known as: KLOR-CON  Take 20 mEq by mouth 2 (two) times a day.  Dose: 20 mEq     PROLENSA 0.07 % drops  Administer 1 drop into the right eye daily.  Dose: 1 drop  Generic drug: bromfenac     thiamine 50 mg tablet  Take 50 mg by mouth daily.  Dose: 50 mg     tobramycin 0.3 % ophthalmic solution  Commonly known as: TOBREX  Administer 1 drop into the right eye 4 (four) times a day.  Dose: 1 drop     torsemide 10 mg tablet  Commonly known as: DEMADEX  Take 20 mg by mouth 2 (two) times a day.  Dose: 20 mg            Review of Systems  Pertinent  items are noted in HPI.    Objective   Labs  Results from last 7 days   Lab Units 07/29/24  0524 07/28/24  1229   WBC K/uL 4.73 5.68   HEMOGLOBIN g/dL 11.7* 11.0*   HEMATOCRIT % 38.0* 34.8*   PLATELETS K/uL 121* 125*     Results from last 7 days   Lab Units 07/29/24  0524 07/28/24  1229   SODIUM mEQ/L 138 138   POTASSIUM mEQ/L 4.2 5.2*   CHLORIDE mEQ/L 104 106   CO2 mEQ/L 22 22   BUN mg/dL 32* 29*   CREATININE mg/dL 2.6* 2.5*   GLUCOSE mg/dL 76 116*   CALCIUM mg/dL 9.1 9.4         Imaging  Reviewed.    Physicial Exam  General appearance: alert, appears stated age, cooperative, and no distress  Eyes: conjunctivae/corneas clear. PERRL, EOM's intact. Fundi benign.  Lungs: normal respiratory effort  Abdomen: soft, non-tender; bowel sounds normal; no masses, no organomegaly  Male genitalia: penis: no lesions or discharge. Testes: no masses or tenderness. No hernias.  Extremities: extremities normal, warm and well-perfused; no cyanosis, clubbing, or edema  Skin: Skin color, texture, turgor normal. No rashes or lesions      Assessment   Patient is 89 y.o. 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    Large prostate seen on CT scan. UTIs are likely due to retaining urine in his bladder due to incomplete voiding.     Plan   - start proscar daily to shrink the size of his prostate  - trend PVR to ensure adequate voiding  - f/u cr, currently at baseline  - avoid anticholinergics, narcotics, sedative medication as possible  - Outpatient follow up with Dr. Wade to talk about possible BPH treatments - medical vs surgical  - Rest of care per primary team    Mariposa Parks DO PGY2  Urology Resident  [facility]  [facility] Pager #[phone]  [place] Pager #[phone]

## 2024-07-29 NOTE — ASSESSMENT & PLAN NOTE
- Debility likely multifactorial in the setting of multiple comorbid conditions, adv age, acute hospitalization for UTI vs PNA, now aspirating all consistencies on VFSS  - Resides at home with 24 hour care Baseline functional status: largely WC bound, able to take some steps w/ assistance  Frailty:advanced  - Pt remains high risk for further deterioration, functional decline and death  -PPS: Current 30 % Baseline unknown      Plan:  - Continue supportive interventions to treat reversible causes of illness  - Comfort feeds  - Revisit  GOC if pt declines  - home w palliative bridge and HBNP practice

## 2024-07-29 NOTE — PROGRESS NOTES
Internal Medicine  Daily Progress Note       SUBJECTIVE   This is a 89 y.o. year-old male admitted on 7/28/2024 with Lower urinary tract infection [N39.0]  Pleural effusion on left [J90]  Acute encephalopathy [G93.40]  Acute on chronic congestive heart failure, unspecified heart failure type (CMS/HCC) [I50.9].    Interval History: NAEON. PT is AAOx3 and conversational. Denies any fevers, chills, cp, sob, abd pain, n/v/d, baker urinary symptoms.     OBJECTIVE   Vital signs in last 24 hours:  Temp:  [36.4 °C (97.5 °F)-36.7 °C (98 °F)] 36.6 °C (97.9 °F)  Heart Rate:  [61-72] 68  Resp:  [14-30] 16  BP: (101-133)/(58-72) 112/58  SpO2:  [96 %-98 %] 96 %  Oxygen Therapy: None (Room air)    Weight & I/Os:  Weights (last 5 days)       Date/Time Weight    07/29/24 0514 70.5 kg (155 lb 8 oz)    07/29/24 0000 69.2 kg (152 lb 9.6 oz)    07/28/24 1223 70.3 kg (155 lb)            Intake/Output Summary (Last 24 hours) at 7/29/2024 0659  Last data filed at 7/28/2024 1900  24 Hour Net Input/Output from 7AM Yesterday   Intake 100 ml   Output 500 ml   Net -400 ml        PHYSICAL EXAMINATION   Physical Exam  Constitutional:       Appearance: Normal appearance.   HENT:      Head: Normocephalic and atraumatic.   Eyes:      Extraocular Movements: Extraocular movements intact.   Cardiovascular:      Rate and Rhythm: Normal rate and regular rhythm.      Heart sounds: No murmur heard.     No friction rub. No gallop.   Pulmonary:      Effort: Pulmonary effort is normal. No respiratory distress.      Breath sounds: Normal breath sounds. No stridor. No wheezing or rales.   Abdominal:      General: Abdomen is flat. There is no distension.      Palpations: Abdomen is soft.      Tenderness: There is no abdominal tenderness. There is no guarding or rebound.   Skin:     General: Skin is warm.   Neurological:      Mental Status: He is alert and oriented to person, place, and time.          LINES, CATHETERS, DRAINS, AIRWAYS, & WOUNDS   Lines, drains,  airways, & wounds:  Peripheral IV (Adult) Anterior;Distal;Left Forearm (Active)   Number of days:        External Urinary Catheter Medium (Active)   Number of days: 1        LABS, IMAGING, & TELE   Labs:  A below.    Results from last 7 days   Lab Units 07/29/24  0524 07/28/24  1229   WBC K/uL 4.73 5.68   HEMOGLOBIN g/dL 11.7* 11.0*   HEMATOCRIT % 38.0* 34.8*   PLATELETS K/uL 121* 125*       Results from last 7 days   Lab Units 07/29/24  0524 07/28/24  1229   SODIUM mEQ/L 138 138   POTASSIUM mEQ/L 4.2 5.2*   CHLORIDE mEQ/L 104 106   CO2 mEQ/L 22 22   BUN mg/dL 32* 29*   CREATININE mg/dL 2.6* 2.5*   CALCIUM mg/dL 9.1 9.4   ALBUMIN g/dL  --  3.7   BILIRUBIN TOTAL mg/dL  --  1.2   ALK PHOS IU/L  --  89   ALT IU/L  --  7   AST IU/L  --  42*   GLUCOSE mg/dL 76 116*     Imaging personally reviewed (does not include unread studies):  CT CHEST ABDOMEN PELVIS WITHOUT IV CONTRAST    Result Date: 7/29/2024  CLINICAL HISTORY: UTI, pleural effusion COMPARISON: CT 8/3/2023 COMMENT: TECHNIQUE: CT of the chest, abdomen and pelvis were performed. The lack of intravenous contrast limits interpretation of the solid organs, vessels and certain processes. CT DOSE:  One or more dose reduction techniques (e.g. automated exposure control, adjustment of the mA and/or kV according to patient size, use of iterative reconstruction technique) utilized for this examination. CHEST CHEST WALL AND LOWER NECK: within normal limits MEDIASTINUM AND JULISSA: Moderate gas-filled esophagus with air-fluid levels. HEART: Massive cardiomegaly again seen with marked right-sided chamber enlargement.  Epicardial leads. No pericardial effusion. VESSELS: Mildly aneurysmal ascending aorta just above the sinotubular Junction at 3.9 x 4 cm AP by transverse.  Main pulmonary caliber is normal.  Extensive vascular calcifications involving the aorta, aortic branch vessels and coronary arteries.  Unchanged right internal iliac artery aneurysm measuring 3.6 x 3.7 cm AP by  transverse, more completely characterized on the prior contrast enhanced examination. LUNG , LARGE AIRWAYS AND PLEURA: Moderate to large layering left pleural effusion with near complete left lower lobe collapse (noting urinary portions of the superior segment of the left lower lobe) not significantly changed since prior chest CT and also present on multiple prior chest x-ray examinations. Left upper lobe airspace disease, centered within the posterior segment of left upper lobe, slightly increased from prior examination.  Biapical fibrosis. Multifocal areas of bronchiectasis, including within the right upper and lower lobes.  No pneumothorax or pneumomediastinum. Unchanged small right layering pleural effusion. Calcified granulomas are again seen. No evidence of pulmonary vascular redistribution. BONES: Sternotomy changes.  Extensive multilevel degenerative disc disease. Diffuse osseous demineralization.  Unchanged C5 anterior wedge-shaped compression deformity.  Degenerative changes spanning the symphysis pubis. Sacroiliac joint and bilateral hips degenerative changes.  Unchanged grade 1 anterolisthesis of L4 on L5. Minimal L3 superior endplate compression deformity again seen. ABDOMEN AND PELVIS LIVER: within normal limits BILE DUCTS: normal caliber GALLBLADDER: Removed PANCREAS: within normal limits SPLEEN: within normal limits ADRENALS: within normal limits KIDNEYS/URETERS/BLADDER: Numerous renal cysts in addition to indeterminate hyperattenuating lesions within the kidneys, not significantly changed when given for differences in technique.  Right interpolar renal calculus again seen measuring 0.3 cm.  Left lower pole renal calculus again seen measuring 0.2 cm. No hydroureteronephrosis.  Urinary bladder described below. REPRODUCTIVE ORGANS: Enlarged prostate gland again seen with median lobe hypertrophy.  Concomitant findings of chronic urinary bladder outflow obstruction with numerous pseudodiverticula. BOWEL:  Extensive colonic diverticulosis.  Left lower quadrant end colostomy and Isael pouch changes are again seen.  Normal caliber retrocecal appendix. Normal bowel caliber. no ascites or free air, no fluid collection. ABDOMINAL LYMPH NODES: within normal limits. ABDOMINAL WALL: within normal limits     IMPRESSION: The lack of intravenous contrast limits interpretation of the solid organs, vessels and certain processes. Moderate to large left pleural effusion with near-complete collapse left lower lobe again seen. Left upper lobe airspace disease with increased consolidation since 8/3/2023, due to either volume loss and/or pneumonia/pneumonitis. Unchanged small right layering pleural effusion. Enlarged prostate gland with findings of urinary bladder outlet obstruction again seen.  Correlate with recently performed urinalysis if there is concern for urinary tract infection. Additional chronic/stable findings as described above.     CT HEAD WITHOUT IV CONTRAST    Result Date: 7/28/2024  CLINICAL HISTORY: Mental status change.     IMPRESSION: No acute intracranial abnormalities. COMMENT: Computed tomography of the brain was performed at 3.0 mm intervals without intravenous contrast administration. The current study was compared to that dated August 3, 2023 There is no evidence of mass effect or shift of the midline structures. There is ventricular prominence and cortical sulcal prominence, consistent with involutional changes. There is no evidence of acute intracranial hemorrhage or acute cortical infarction. There are no extra-axial fluid collections. There is patchy periventricular white matter hypoattenuation, consistent with microangiopathic changes. The bony calvarium is intact. Imaged portions of the paranasal sinuses and mastoid air cells are clear and intact. CT DOSE:  One or more dose reduction techniques (e.g. automated exposure control, adjustment of the mA and/or kV according to patient size, use of iterative  reconstruction technique) utilized for this examination.     X-RAY CHEST 1 VIEW    Result Date: 7/28/2024  CLINICAL HISTORY: Shortness of breath     IMPRESSION: Increasing left pleural effusion. COMMENT: The current portable study the chest was compared to that dated May 31, 2024 Further opacification of the left hemithorax is seen secondary to an increasing left pleural effusion. The lower half of the left hemithorax is now obscured. Additional volume loss is likely present in the right retrocardiac region. No right pleural effusion is seen There is no gross pulmonary vascular congestion or edema.   Telemetry/EKG:  No acute events.     ASSESSMENT & PLAN   CALI (acute kidney injury) (CMS/[facility])  Assessment & Plan  P/w Cr 2.5 from baseline 1.4, BUN 29  UA with mild pyuria, +1LE  BUN/CR ratio 11    Likely intrinsic renal injury with contribution from hypovolemia/brewing infection    -F/u bladder scan if retaining on bladder scan, straight cath to obtain a urine sample  -Follow-up urine lites including urine urea given recent diuretic use  -Follow-up CT CAP to evaluate for hydro/renal calculi    Hypertension  Assessment & Plan  Presenting with BP 110s to 120s/70s  Home meds include amlodipine, metoprolol, torsemide    - Resume as long as BP tolerates    A-fib (CMS/[facility])  Assessment & Plan  History of A-fib maintained on metoprolol and not AC  Reported history of GI bleeds    - Rate controlled in the ED  - Follow-up EKG, unable to locate in the ED  - Continue metoprolol    Shortness of breath  Assessment & Plan  P/W 1 day acute SOB at rest  History of recurrent pleural effusions requiring L sided thoracentesis  VBG without hypercarbia or hypoxia.  No leukocytosis or acute anemia  Euvolemic to dry on exam, saturating appropriately on room air  CXR with worsening left pleural effusion    - Hold off on thoracentesis for now as he is on room air and comfortable  - S/p 1 dose 60 mg Lasix in the ED, would hold off on further  diuretics given concern for  infection  - Lower concern for PNA given lack of focal consolidation on imaging, leukocytosis or PNA symptoms  - Monitor respiratory symptoms and O2 needs closely    History of recurrent UTIs  Assessment & Plan  History of frequent UTIs as reported by son  No reported history of renal calculi or BPH however  UA with mild pyuria 35-50 WBC, no bacteria.  No ketones, protein, blood +1 LE.  CT from 08/2023 showed Multiple small nonobstructing right renal calculi  CT from 10/2022 showed prostatic megaly  Presenting today with AMS similar to prior episodes of UTIs    Concern for recurrent UTI unresolved with OP treatment with Bactrim and Cipro.  UA may be partially sterilized now after outpatient ABX.    - Concern for resistant pathogens  - Continue broad GNR coverage with ceftaz  - Follow-up urine culture and narrow ABX as able  - Follow-up CT AP to rule out anatomic defects/renal calculi/worsening prostatic megaly with undiagnosed BPH contributing to recurrent UTIs  - Should follow-up with urology outpatient    HFrEF (heart failure with reduced ejection fraction) (CMS/[facility])  Assessment & Plan  Follows with  at [facility]  Most recent echo in 03/2024 with EF 45 to 50%, severe TR  On admission appears hypovolemic/euvolemic, saturating appropriately on room air  , Trop 22.9--> 23.2.  VBG 7.38/40.  CXR with increasing left pleural effusion    Does not appear to be in CHF exacerbation    - S/p 60 mg Lasix in the ED  - Would hold further diuresis given concern for infection and dehydration contributing to altered mental status  - Continue rest of GDMT including metoprolol given no SIRS/sepsis physiology.  Does not appear to be on ACE/ARB or MRA  - SGLT2 indefinitely discontinued 2/2 history of Justin's gangrene  - Daily I's/O, standing weights    * Encephalopathy  Assessment & Plan  2 weeks of AMS with delusions/hallucinations similar to prior UTI episodes  BUN 29, not  significantly elevated.  No leukocytosis, no other electrolyte derangements.  VBG 7.38/40  Recent UTI likely inadequately treated  CT head negative  No toxin exposure    Likely toxic metabolic encephalopathy in the setting of ongoing/undertreated UTI plus dehydration 2/2 poor p.o. intake    - S/p 1 dose ceftaz, continue broad GNR coverage for now, narrow as able  - SLP consult to ensure aspiration is not contributing to AMS  - Follow-up B12, folate, TSH for completeness particularly given history of hypothyroidism  - ID consult for ABX  - Multi-component delirium prevention (minimize deliriogenic medications, promote OOB, avoid restraints/lines/drains, avoid sensory deprivation, promote regular sleep-wake cycle, frequent reorientation)  -C/f Aspiration, SLP recommends swallow study         VTE Assessment: Padua    VTE Prophylaxis: Current anticoagulants:  heparin (porcine) 5,000 unit/mL injection 5,000 Units, subcutaneous, q8h IVÁN      Code Status: DNR (A.N.D.)  Estimated discharge date: 8/1/2024     ATTENDING DOCUMENTATION  ALSO SEE ATTENDING ATTESTATION SECTION OF NOTE

## 2024-07-29 NOTE — PLAN OF CARE
Problem: Adult Inpatient Plan of Care  Goal: Plan of Care Review  Outcome: Progressing  Flowsheets (Taken 7/29/2024 1210)  Progress: improving  Outcome Evaluation: Initial SLP eval completed. REC: NPO pending VFSS later this date. SLP following, further recs pending imaging.  Plan of Care Reviewed With: patient     Problem: Swallowing Impairment  Goal: Optimal Eating and Swallowing Without Aspiration  Outcome: Progressing

## 2024-07-29 NOTE — ASSESSMENT & PLAN NOTE
-in the s/o HFrEF, chronic pleural effusion, high risk of aspiration    -aspiration precautions  -comfort feeds  -if pt declines contact family immediately and readdress GOC  -if family elects comfort care: dilaudid 0.25 mg IV every 2 hours PRN dyspnea

## 2024-07-29 NOTE — ACP (ADVANCE CARE PLANNING)
"Palliative Care Advance Care Planning Note      Patient Name: Samy Elena                                                                                        Patient MRN: [id number]  Discussion Date: 07/29/24   Discussion Participants: patient, son Ethan, Dr. JILL Miller, and myself    Today participants wished to discuss Advance Care Planning. The following summarizes our discussion.    During our visit today, we discussed:     Code Status  Do Not Resuscitate / Allow Natural Death        Health Care Agent/Surrogate Decision Maker   SonMarko who collaborates with his brothers Watson Long     Medical Status/Prognosis  Variable Dependent on Goals of Care     Patient meets criteria for hospice services under the medicare guidelines - which is a designation that carries a prognosis of 6 months or less to live if the disease is to follow the \"normal\" course.        Goals of Care  Restorative to treat reversible causes of illness with QOL(including eating)  and comfort a high priority.    Dr. Miller thoughtfully provided a medical update noting confusion in the s/o dehydration and possible infection, impaired renal function, chronic pleural effusion and severe dysphagia on VFSS. Discussed in detail high risk of aspiration, PNA, and death. Family will discuss whether they wish to proceed with a thoracentesis as inpt vs outpatient when pt develops symptoms. Son watson understands that pleural fluid would most likely re accumulate over time following subsequent thoracentesis.  Reviewed care options including hospice care vs palliative bridge with home based NP practice.  Allowed ample time for questions.    Ethan relates that he and his brothers have had discussions in the past regarding modified diet. Pt and son endorse that eating is important to pt's QOL and they elect comfort feeding, understanding the risks if aspiration, hypoxia, PNA, and death.     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 He enjoys going to a Phillies game and going out to dinner with family and friends. Pt lives at home with 24 hour home health aide.     Family is amenable to ongoing GOC discussions as clinical course evolves. Pt and son expressed appreciation for our discussion.         Advance Care Documents  Not on file in EMR    Patient Capacity  Patient has partial capacity to participate in making  their own medical decisions. He seems to have limited insight into the severity of his illness. Recommend family assist with complex medical decisions.         Plan  -DNR  -Continue current restorative interventions  -Comfort feeds: pt and family accept risk of aspiration, PNA, and death  -Goal is to return home with palliative bridge with the option to transition to hospice     Attestation Statement:  I have spent a total of 46 minutes in face-to-face discussion of patient advance care planning with the patient and/or surrogate decision makers.  No active management of the patient’s problem(s) was undertaken during the time period reported      HEIDI Baxter  7/29/2024 5:26 PM

## 2024-07-29 NOTE — PROCEDURES
07/29/24 Video swallow study was completed. Please refer to the imaging section for full report and recommendations.    Results for orders placed during the hospital encounter of 01/05/23    FLUOROSCOPY VIDEO SWALLOW WITH SPEECH    SPEECH THERAPIST RECOMMENDATIONS:  1. Would initiate goals of care conversation with palliative care consult to better determine patient/family goals of care and best means of nutritional access. In the setting of patient's advanced age and clinical prognosis, may favor initiating comfort feeds with known risk for aspiration, as patient was seen to aspirate across ALL consistencies this date despite compensations and diet texture modifications.  2. SLP to follow up as indicated to further assist GOC conversations and clinical education as indicated.    IMPRESSION:  1. Moderate oral phase. Slow lingual manipulation. Reduced bolus cohesion. Delayed AP transfers. Residuals seen to mostly clear with a subsequent swallow.  2. Profound pharyngeal phase. Delayed swallow initiation, pharyngeal dysmotility, mild to moderate residuals throughout the pharynx, worsened with thicker viscosities. There was SILENT aspiration of thin liquids, mildly thick liquids, moderately thick liquids AND puree solids. Although there was NO response to all aspiration events, the patient did demonstrate a very delayed cough following all events. This in conjunction with compensations trialed were not seen to be effective in ejecting/eliminating aspirated material.  3. Esophageal phase not assessed this date.    Avril Nava PA-C was present for this examination. This study was reviewed by Dr. Goyo Burnham.  The undersigned radiologist agrees only with the radiographic findings.  Specific swallowing recommendations are solely the purview of the Speech Pathology Division.   Arleth Schmitz M.S., CCC-SLP

## 2024-07-29 NOTE — ASSESSMENT & PLAN NOTE
89 y.o. male with a PMH of HFrEF, diverticulitis s/p colostomy, chronic pleural effusion, CAD s/p CABG who presents with 2 weeks of AMS  and SOB for 1 day. Pt is admitted for treatment of possible UTI, PNA, CALI. Pt underwent VFSS 7/29 which showed silent aspiration of all consistencies.    - Code status: DNR  - Prognosis:  patient meets hospice criteria  - Capacity for medical decision making: partial capacity to participate in medial decisions  - Advance Directives: not on file in EMR  - Goals of care: Goals are restorative with QOL and comfort feeding a high priority  - Surrogate Decision Maker:son Marko who collaborates with his brothers  -c/s spiritual care

## 2024-07-30 LAB
ANION GAP SERPL CALC-SCNC: 9 MEQ/L (ref 3–15)
BUN SERPL-MCNC: 32 MG/DL (ref 7–25)
CALCIUM SERPL-MCNC: 8.6 MG/DL (ref 8.6–10.3)
CHLORIDE SERPL-SCNC: 104 MEQ/L (ref 98–107)
CO2 SERPL-SCNC: 25 MEQ/L (ref 21–31)
CREAT SERPL-MCNC: 2.4 MG/DL (ref 0.7–1.3)
EGFRCR SERPLBLD CKD-EPI 2021: 24.7 ML/MIN/1.73M*2
ERYTHROCYTE [DISTWIDTH] IN BLOOD BY AUTOMATED COUNT: 18.5 % (ref 11.6–14.4)
GLUCOSE SERPL-MCNC: 100 MG/DL (ref 70–99)
HCT VFR BLD AUTO: 34.6 % (ref 40.1–51)
HGB BLD-MCNC: 11 G/DL (ref 13.7–17.5)
MAGNESIUM SERPL-MCNC: 2.1 MG/DL (ref 1.8–2.5)
MCH RBC QN AUTO: 31.4 PG (ref 28–33.2)
MCHC RBC AUTO-ENTMCNC: 31.8 G/DL (ref 32.2–36.5)
MCV RBC AUTO: 98.9 FL (ref 83–98)
PDW BLD AUTO: 10.3 FL (ref 9.4–12.4)
PLATELET # BLD AUTO: 116 K/UL (ref 150–350)
POTASSIUM SERPL-SCNC: 4.2 MEQ/L (ref 3.5–5.1)
RBC # BLD AUTO: 3.5 M/UL (ref 4.5–5.8)
SODIUM SERPL-SCNC: 138 MEQ/L (ref 136–145)
WBC # BLD AUTO: 4.84 K/UL (ref 3.8–10.5)

## 2024-07-30 PROCEDURE — 63700000 HC SELF-ADMINISTRABLE DRUG

## 2024-07-30 PROCEDURE — 63600000 HC DRUGS/DETAIL CODE: Mod: JZ

## 2024-07-30 PROCEDURE — 63600000 HC DRUGS/DETAIL CODE

## 2024-07-30 PROCEDURE — 99233 SBSQ HOSP IP/OBS HIGH 50: CPT | Performed by: HOSPITALIST

## 2024-07-30 PROCEDURE — 63600000 HC DRUGS/DETAIL CODE: Mod: JZ | Performed by: HOSPITALIST

## 2024-07-30 PROCEDURE — 25800000 HC PHARMACY IV SOLUTIONS

## 2024-07-30 PROCEDURE — 36415 COLL VENOUS BLD VENIPUNCTURE: CPT

## 2024-07-30 PROCEDURE — 25800000 HC PHARMACY IV SOLUTIONS: Performed by: HOSPITALIST

## 2024-07-30 PROCEDURE — 85027 COMPLETE CBC AUTOMATED: CPT

## 2024-07-30 PROCEDURE — 25000000 HC PHARMACY GENERAL

## 2024-07-30 PROCEDURE — 80048 BASIC METABOLIC PNL TOTAL CA: CPT

## 2024-07-30 PROCEDURE — 83735 ASSAY OF MAGNESIUM: CPT

## 2024-07-30 PROCEDURE — 21400000 HC ROOM AND CARE CCU/INTERMEDIATE

## 2024-07-30 RX ORDER — CALCIUM CARBONATE 200(500)MG
200 TABLET,CHEWABLE ORAL 3 TIMES DAILY
Status: DISCONTINUED | OUTPATIENT
Start: 2024-07-30 | End: 2024-08-01 | Stop reason: HOSPADM

## 2024-07-30 RX ORDER — FINASTERIDE 5 MG/1
5 TABLET, FILM COATED ORAL DAILY
Status: DISCONTINUED | OUTPATIENT
Start: 2024-07-30 | End: 2024-08-01 | Stop reason: HOSPADM

## 2024-07-30 RX ADMIN — CLOPIDOGREL 75 MG: 75 TABLET ORAL at 09:54

## 2024-07-30 RX ADMIN — CEFTRIAXONE SODIUM 2 G: 2 INJECTION, POWDER, FOR SOLUTION INTRAMUSCULAR; INTRAVENOUS at 20:55

## 2024-07-30 RX ADMIN — DOXYCYCLINE 100 MG: 100 INJECTION, POWDER, LYOPHILIZED, FOR SOLUTION INTRAVENOUS at 05:36

## 2024-07-30 RX ADMIN — THIAMINE HCL TAB 100 MG 50 MG: 100 TAB at 09:54

## 2024-07-30 RX ADMIN — ESCITALOPRAM OXALATE 5 MG: 5 TABLET, FILM COATED ORAL at 09:54

## 2024-07-30 RX ADMIN — TOBRAMYCIN 1 DROP: 3 SOLUTION/ DROPS OPHTHALMIC at 20:56

## 2024-07-30 RX ADMIN — PANTOPRAZOLE SODIUM 20 MG: 20 TABLET, DELAYED RELEASE ORAL at 09:54

## 2024-07-30 RX ADMIN — FAMOTIDINE 10 MG: 20 TABLET, FILM COATED ORAL at 09:54

## 2024-07-30 RX ADMIN — CEFEPIME 1 G: 1 INJECTION, POWDER, FOR SOLUTION INTRAMUSCULAR; INTRAVENOUS at 14:32

## 2024-07-30 RX ADMIN — Medication 500 MCG: at 09:56

## 2024-07-30 RX ADMIN — HEPARIN SODIUM 5000 UNITS: 5000 INJECTION, SOLUTION INTRAVENOUS; SUBCUTANEOUS at 05:36

## 2024-07-30 RX ADMIN — FINASTERIDE 5 MG: 5 TABLET, FILM COATED ORAL at 09:55

## 2024-07-30 RX ADMIN — ANTACID TABLETS 200 MG OF ELEMENTAL CALCIUM: 500 TABLET, CHEWABLE ORAL at 20:56

## 2024-07-30 RX ADMIN — GENTAMICIN SULFATE 1 DROP: 3 SOLUTION OPHTHALMIC at 14:34

## 2024-07-30 RX ADMIN — DOXYCYCLINE 100 MG: 100 INJECTION, POWDER, LYOPHILIZED, FOR SOLUTION INTRAVENOUS at 18:07

## 2024-07-30 RX ADMIN — METOPROLOL SUCCINATE 25 MG: 25 TABLET, EXTENDED RELEASE ORAL at 18:06

## 2024-07-30 RX ADMIN — METHENAMINE HIPPURATE 1 G: 1 TABLET ORAL at 14:28

## 2024-07-30 RX ADMIN — LEVOTHYROXINE SODIUM 100 MCG: 0.1 TABLET ORAL at 05:36

## 2024-07-30 RX ADMIN — GENTAMICIN SULFATE 1 DROP: 3 SOLUTION OPHTHALMIC at 21:07

## 2024-07-30 RX ADMIN — ANTACID TABLETS 200 MG OF ELEMENTAL CALCIUM: 500 TABLET, CHEWABLE ORAL at 11:23

## 2024-07-30 RX ADMIN — CEFEPIME 1 G: 1 INJECTION, POWDER, FOR SOLUTION INTRAMUSCULAR; INTRAVENOUS at 01:30

## 2024-07-30 RX ADMIN — Medication 3 MG: at 21:07

## 2024-07-30 RX ADMIN — HEPARIN SODIUM 5000 UNITS: 5000 INJECTION, SOLUTION INTRAVENOUS; SUBCUTANEOUS at 14:29

## 2024-07-30 RX ADMIN — HEPARIN SODIUM 5000 UNITS: 5000 INJECTION, SOLUTION INTRAVENOUS; SUBCUTANEOUS at 21:07

## 2024-07-30 RX ADMIN — TAMSULOSIN HYDROCHLORIDE 0.4 MG: 0.4 CAPSULE ORAL at 09:54

## 2024-07-30 NOTE — CONSULTS
"Brief Nutrition Note    Recommendations     -Continue current diet  -and encourage PO intake >75% of each meal, as tolerated.   Monitor for ONS amenability.   Monitor GOC     Clinical Course: Patient is a 89 y.o. male who was admitted on 7/28/2024 with a diagnosis of Lower urinary tract infection [N39.0]  Pleural effusion on left [J90]  Acute encephalopathy [G93.40]  Acute on chronic congestive heart failure, unspecified heart failure type (CMS/HCC) [I50.9].     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)     Coronary artery disease     Disease of thyroid gland     Will catheter in place     6/25 not at present    GI (gastrointestinal bleed)     Hypertension     Myocardial infarction (CMS/HCC)     Orthostatic hypotension     TIA (transient ischemic attack)      Past Surgical History:   Procedure Laterality Date    CATARACT EXTRACTION W/ INTRAOCULAR LENS IMPLANT Left     CHOLECYSTECTOMY      COLOSTOMY      CORONARY ARTERY BYPASS GRAFT      JOINT REPLACEMENT Left     Knee    THYROIDECTOMY         Reason for Assessment  Reason For Assessment: physician consult  Diagnosis: cardiac disease    [facility] Nutrition Screen Tool  Has patient lost weight without trying?: 0-->No  Has patient been eating poorly due to decreased appetite?: 0-->No  [facility] Nutrition Screen Score: 0     Nutrition Order  Nutrition Order: meets nutritional requirements  Nutrition Order Comments: regular 1500mL FR     Anthropometrics  Height: 185.4 cm (6' 1\")     Current Weight  Weight Method: Bed scale  Weight: 68.4 kg (150 lb 12.7 oz)     Ideal Body Weight (IBW)  Ideal Body Weight (IBW) (kg): 84.86  % Ideal Body Weight: 81.57       Body Mass Index (BMI)  BMI (Calculated): 19.9     Labs/Procedures/Meds  Lab Results Reviewed: reviewed, pertinent   Lab Results   Component Value Date    GLUCOSE 100 (H) " 07/30/2024    CALCIUM 8.6 07/30/2024     07/30/2024    K 4.2 07/30/2024    CO2 25 07/30/2024     07/30/2024    BUN 32 (H) 07/30/2024    CREATININE 2.4 (H) 07/30/2024   '    Medications  Pertinent Medications Reviewed: reviewed, pertinent    calcium (as carbonate)  500 mg oral TID    cefepime  1 g intravenous q12h INT    clopidogreL  75 mg oral q AM    cyanocobalamin  500 mcg oral q AM    doxycycline  100 mg intravenous q12h INT    escitalopram  5 mg oral q AM    famotidine  10 mg oral Daily    finasteride  5 mg oral Daily    gentamicin  1 drop Right Eye q8h IVÁN    heparin (porcine)  5,000 Units subcutaneous q8h IVÁN    latanoprost  1 drop Left Eye Nightly    levothyroxine  100 mcg oral Daily (6:30a)    melatonin  3 mg oral Nightly    methenamine  1 g oral Daily before lunch    metoprolol succinate XL  25 mg oral Daily with dinner    NON FORMULARY MEDICATION REQUEST  1 drop Right Eye TID    NON FORMULARY MEDICATION REQUEST  1 drop Right Eye q AM    NON FORMULARY MEDICATION REQUEST  1 drop Left Eye Daily    NON FORMULARY MEDICATION REQUEST  1 drop Right Eye Daily    pantoprazole  20 mg oral Daily    tamsulosin  0.4 mg oral Daily    thiamine  50 mg oral Daily    [Provider Managed Hold] torsemide  20 mg oral BID         Skin:intact  No edema noted.     Clinical comments:  Pt seen for CHF ed c/s. Education not appropriate due to advanced age and cognitive status. Pt admitted with encephalopathy likely r/t UTI. Hx of HFrEF, diverticulitis, colostomy, HTN, A fib.   GOC ongoing. Pts family elected for regular diet despite aspiration risk as they would like for pt to be comfortable.   Will continue to monitor and reassess per protocol unless otherwise consulted.   Goals:PO intake >75% of nutrient needs.  Monitor:Diet order, appetite, PO intake, labs, I/Os, skin integrity, wt status, GI function.       Recommendations: See above       Date: 07/30/24  Signature: BRANDY Bermudez

## 2024-07-30 NOTE — PROGRESS NOTES
Infectious Disease Progress Note    Patient Name: Samy Elena  MR#: [id number]  : 10/6/1930  Admission Date: 2024  Date: 24   Time: 3:16 PM   Author: Josh Foote DO        Antibiotics:  Anti-infectives (From admission, onward)      Start     Dose/Rate Route Frequency Ordered Stop    24 2100  tobramycin (TOBREX) 0.3 % ophthalmic solution 1 drop (PT-OWN)         1 drop Right Eye Daily 24 2156      24 1730  doxycycline (VIBRAMYCIN) 100 mg in sodium chloride 0.9 % 100 mL IVPB         100 mg  100 mL/hr over 60 Minutes intravenous Every 12 hours interval 24 1544      24 1400  ceFEPIme (MAXIPIME) 1 g in 100 mL NSS vial in bag        See Hyperspace for full Linked Orders Report.    1 g  200 mL/hr over 30 Minutes intravenous Every 12 hours interval 24 1255      24 1130  methenamine (HIPREX) tablet 1 g         1 g oral Daily before lunch 24 1930      24 2200  gentamicin (GARAMYCIN) 0.3 % ophthalmic solution 1 drop         1 drop Right Eye Every 8 hours 24                Subjective   Patient seen and examined at bedside.  He reports feeling improved at this time especially in regards to his cough.  Denies any pain on urination or increased frequency.    Objective     Vital Signs:    Vitals:    24 0751 24 0945 24 1055 24 1447   BP: 114/66 109/63 (!) 103/59 117/62   BP Location: Left upper arm Left upper arm Left upper arm Left upper arm   Patient Position: Lying Lying Sitting Lying   Pulse: 78 87 72 68   Resp: 18 20 20 18   Temp: 36.4 °C (97.5 °F)  36.6 °C (97.9 °F) 36.7 °C (98 °F)   TempSrc: Oral  Oral Oral   SpO2: 95%  97% 97%   Weight:       Height:         Temp (72hrs), Av.5 °C (97.7 °F), Min:36.3 °C (97.3 °F), Max:36.7 °C (98 °F)      Physical Exam:  General: non toxic, alert  HEENT: NC/AT/ anicteric sclera  Neck: supple, no meningismus  Cardiovascular: S1/S2 present.  No murmur, rub or  gallop  Respiratory: clear to auscultation  GI/Abdomen: soft, NT/ND,  no peritoneal signs, +colostomy, no sacral wound  : +condom cath  Extremities: no clubbing, cyanosis, or edema  JOINTS:  No swelling, erythema, or pain  Lymphatics: no lymphadenopathy  Neurology: no focal deficits, hard of heraing  Psych: cooperative with exam  Skin: no rashes      Lines, Drains, Airways, Wounds:  Peripheral IV (Adult) Anterior;Distal;Left Forearm (Active)   Number of days:        External Urinary Catheter Medium (Active)   Number of days: 2       Labs:    CBC Results         07/30/24 07/29/24 07/28/24     0343 0524 1229    WBC 4.84 4.73 5.68    RBC 3.50 3.63 3.47    HGB 11.0 11.7 11.0    HCT 34.6 38.0 34.8    MCV 98.9 104.7 100.3    MCH 31.4 32.2 31.7    MCHC 31.8 30.8 31.6     121 125          CMP Results         07/30/24 07/29/24 07/28/24     0343 0524 1229     138 138    K 4.2 4.2 5.2    Cl 104 104 106    CO2 25 22 22    Glucose 100 76 116    BUN 32 32 29    Creatinine 2.4 2.6 2.5    Calcium 8.6 9.1 9.4    Anion Gap 9 12 10    AST -- -- 42    ALT -- -- 7    Albumin -- -- 3.7    EGFR 24.7 22.4 23.5           Comment for K at 1229 on 07/28/24: Results obtained on plasma. Plasma Potassium values may be up to 0.4 mEQ/L less than serum values. The differences may be greater for patients with high platelet or white cell counts.    Comment for EGFR at 0343 on 07/30/24: Calculation based on the Chronic Kidney Disease Epidemiology Collaboration (CKD-EPI) equation refit without adjustment for race.    Comment for EGFR at 0524 on 07/29/24: Calculation based on the Chronic Kidney Disease Epidemiology Collaboration (CKD-EPI) equation refit without adjustment for race.    Comment for EGFR at 1229 on 07/28/24: Calculation based on the Chronic Kidney Disease Epidemiology Collaboration (CKD-EPI) equation refit without adjustment for race.              MICRO:    Microbiology Results       Procedure Component Value Units Date/Time  "   MRSA Screen, Nares Only Nose [[id number]]  (Normal) Collected: 07/29/24 1511    Specimen: Nasal Swab from Nose Updated: 07/29/24 1810     MRSA DNA, Nares Negative    SARS-COV-2 (COVID-19)/ FLU A/B, AND RSV, PCR Nasopharynx [[id number]]  (Normal) Collected: 07/28/24 1320    Specimen: Nasopharyngeal Swab from Nasopharynx Updated: 07/28/24 1425     SARS-CoV-2 (COVID-19) Negative     Influenza A Negative     Influenza B Negative     Respiratory Syncytial Virus Negative    Narrative:      Testing performed using real-time PCR for detection of COVID-19. EUA approved validation studies performed on site.     Urine culture Urine, Clean Catch [[id number]] Collected: 07/28/24 1320    Specimen: Urine, Clean Catch Updated: 07/29/24 1228     Urine Culture Probable contaminants, suggest recollection      30,000-39,000 cfu/mL Mixed Growth            Assessment  89 yo M hx HFrEF, nonrheumatic TR, A-fib not on AC due to GIB, HTN, prior diverticulitis s/p colostomy, recurrent pleural effusions requiring multiple thoracenteses, prior Justin's, CAD s/p CABG who presented to care for shortness of breath.     ID-related problems     Encephalopathy in setting of shortness of breath and C/F obstruction v UTI v PNA-VSS.  WBC 5.  UA WBC 35-50.  Encephalopathy was reported as his \"symptom\" for a urinary tract infection. CT A/P w/ L effusion and PRINCESS consolidation.  Prior UCX 11/2022 with relatively susceptible K pneumo and Pseudomonas. . Ucx w/ mixed growth 30-39 cfus. I am less suspicious of UTI as driving cause of his SOB/admit.   SOB- improved post diuresis. Cxray w/ increased size of L pleural effusion. +PRINCESS consolidation. +aspiration risk.  Ceftriaxone can provide adequate coverage in the setting of aspiration.     Recommendations     OK to stop cefepime and start ceftriaxone 2g q24H to cover for PNA (do not need pseudomonal coverage given UTI is unlikely)  C/w doxycycline 100mg BID for coverage of possible atypical PNA.   Would " discuss w/ urology given c/f component of urinary obstruction in setting of BPH.   Low threshold for repeat L thora. If performed, please obtain fluid analysis, aerobic/anaerobic, AFB, fungal cx.   If develops productive cough, would obtain sputum cx  Fu Ucx/BCx    ID will continue to follow. Please call or page with any questions/concerns.    Josh Foote DO  [facility]

## 2024-07-30 NOTE — PLAN OF CARE
Plan of Care Review  Plan of Care Reviewed With: patient  Progress: no change  Outcome Evaluation: Pt AAOx3, calm and cooperative w/ care. VSS. Meds given as prescribed. Incontinent care done, [place] cath in place. Call bell within reach, pt sleeping in between care. Bed alarm on.

## 2024-07-30 NOTE — PROGRESS NOTES
Patient: Samy Elena  Location: [place] [address]  MRN:  [id number]  Today's date:  7/30/2024    Attempted to see patient for therapy. Unable due to medical hold. Following VFSS yesterday with confirmed aspiration across all consistencies, patient/family and medical team discussed goals of care with palliative. Decision has been made at this time to pursue feeding for pleasure, accepting known HIGH risk for aspiration and further risk for deterioration. SLP to sign off at this time as acute dysphagia services are no longer indicated. Please re consult if new concerns arise.

## 2024-07-30 NOTE — PROGRESS NOTES
Internal Medicine  Daily Progress Note       SUBJECTIVE   This is a 89 y.o. year-old male admitted on 7/28/2024 with Lower urinary tract infection [N39.0]  Pleural effusion on left [J90]  Acute encephalopathy [G93.40]  Acute on chronic congestive heart failure, unspecified heart failure type (CMS/HCC) [I50.9].    Interval History: No acute events overnight. Patient feels good this morning and was excited to eat his breakfast. Denies any fevers/chills, headache, chest pain, SOB, N/V/D, dysuria, dizziness/lightheadedness.     OBJECTIVE   Vital signs in last 24 hours:  Temp:  [36.3 °C (97.3 °F)-36.6 °C (97.9 °F)] 36.6 °C (97.9 °F)  Heart Rate:  [57-87] 72  Resp:  [16-20] 20  BP: (101-114)/(55-66) 103/59  SpO2:  [95 %-98 %] 97 %  Oxygen Therapy: None (Room air)    Weight & I/Os:  Weights (last 5 days)       Date/Time Weight    07/30/24 0554 68.4 kg (150 lb 12.7 oz)    07/29/24 0514 70.5 kg (155 lb 8 oz)    07/29/24 0000 69.2 kg (152 lb 9.6 oz)    07/28/24 1223 70.3 kg (155 lb)            Intake/Output Summary (Last 24 hours) at 7/30/2024 0659  Last data filed at 7/30/2024 0638  24 Hour Net Input/Output from 7AM Yesterday   Intake 1301 ml   Output 250 ml   Net 1051 ml        PHYSICAL EXAMINATION   Physical Exam  Constitutional:       Appearance: Normal appearance.   Eyes:      Conjunctiva/sclera: Conjunctivae normal.   Cardiovascular:      Rate and Rhythm: Normal rate and regular rhythm.      Pulses: Normal pulses.      Heart sounds: Normal heart sounds.   Pulmonary:      Effort: Pulmonary effort is normal.      Breath sounds: Normal breath sounds.   Abdominal:      General: Bowel sounds are normal.      Palpations: Abdomen is soft.   Skin:     General: Skin is warm and dry.   Neurological:      General: No focal deficit present.      Mental Status: He is alert.      Comments: Oriented to person and place but not time.   Psychiatric:         Mood and Affect: Mood normal.         Behavior: Behavior normal.           LINES, CATHETERS, DRAINS, AIRWAYS, & WOUNDS   Lines, drains, airways, & wounds:  Peripheral IV (Adult) Anterior;Distal;Left Forearm (Active)   Number of days:        External Urinary Catheter Medium (Active)   Number of days: 2        LABS, IMAGING, & TELE   Labs:  I have reviewed the patient's labs to the time of note. No new clinical concern.    Results from last 7 days   Lab Units 07/30/24  0343 07/29/24  0524 07/28/24  1229   WBC K/uL 4.84 4.73 5.68   HEMOGLOBIN g/dL 11.0* 11.7* 11.0*   HEMATOCRIT % 34.6* 38.0* 34.8*   PLATELETS K/uL 116* 121* 125*       Results from last 7 days   Lab Units 07/30/24  0343 07/29/24  0524 07/28/24  1229   SODIUM mEQ/L 138 138 138   POTASSIUM mEQ/L 4.2 4.2 5.2*   CHLORIDE mEQ/L 104 104 106   CO2 mEQ/L 25 22 22   BUN mg/dL 32* 32* 29*   CREATININE mg/dL 2.4* 2.6* 2.5*   CALCIUM mg/dL 8.6 9.1 9.4   ALBUMIN g/dL  --   --  3.7   BILIRUBIN TOTAL mg/dL  --   --  1.2   ALK PHOS IU/L  --   --  89   ALT IU/L  --   --  7   AST IU/L  --   --  42*   GLUCOSE mg/dL 100* 76 116*     Imaging personally reviewed (does not include unread studies):  FLUOROSCOPY VIDEO SWALLOW WITH SPEECH    Result Date: 7/29/2024  CLINICAL HISTORY: Dysphagia COMMENT:  A video swallow study was conducted jointly by Speech Pathology and Radiology to determine the risks or presence of dysphagia, and to delineate a plan of care as indicated.  The patient was provided with the following substances:  barium impregnated puree, regular, thin liquids. CLINICAL COURSE: 89-year-old male with history of HFrEF, HTN, prior diverticulitis status post colostomy, recurrent pleural effusions requiring multiple thoracentesis, CAD status post CABG presented this admission with shortness of breath, confusion, and hallucinations over the last 2 weeks. Patient/family initially thought that this was secondary to UTI, however, there was no change in his symptoms. Patient known with prior SLP history, last seen on 3/28/2024 with  recommendations to pursue regular solids/thin liquid diet. Patient with x 3 VFSS completed previously, most recently on 1/5/2023 with aspiration of thin liquids via straw observed. When utilized with a chin tuck, there was a sensory response to aspirated material. No aspiration was observed with single cup sips of thin liquids, mildly thick liquids and solids. See imaging section for more details. Patient now readmitted with 2 weeks of altered mental status, thought to be metabolic encephalopathy, and shortness of breath as well as recent UTI diagnosis. SLP consulted to assess swallowing safety. CT CHEST completed on 7/28/24 w/ near-complete collapse of LLL iso mod-large L PE, unchanged R layering PE. Patient resting on RA with stable WBC. Patient demonstrating concerns for aspiration at the bedside with findings of wet VQ, throat clearing following the swallow and likely multiple swallows per bolus. 3oz water protocol failed. RSST unable to be completed 2/2 cognitive deficits. VFSS is warranted to greater analyze swallow physiology and determine candidacy for oral diet advancement. PAST MEDICAL HISTORY: Hypertension, ataxia, debility, coronary artery diease, depression, chronic kidney disease, urinary tract infection, obstructive sleep apnea, chronic CHF, glaucoma, encephalopathy, dysphagia. VIEW: Lateral view sitting upright in stretcher. FLUOROSCOPY TIME: 4.5min Images/Series Sent 13 Entrance Dose: 7.6milligray ORAL STAGE: Moderate oral phase. There was adequate oral access and containment. There was fairly slow lingual manipulation and mashing with lingual pumping observed. There was reduced bolus cohesion, evidenced via sublingual leakage of both liquid and solid textures. There were delayed and segmented AP transfers. There was posterior loss of moderately thick liquids to the mid pharynx as well as mildly thick and thin liquids to the distal pharynx. There were mild to moderate residuals seen along the floor  of mouth and posterior lingual surface. Residuals seen to mostly clear with a subsequent swallow. PHARYNGEAL STAGE: Profound pharyngeal phase. There was delayed swallow initiation, allowing for premature bolus leakage of thin and mildly thick liquids to the distal pharynx, as well as moderately thick liquids to the mid pharynx. There was pharyngeal dysmotility, characterized by reduced tongue base retraction, pharyngeal constriction, hyolaryngeal elevation and poor airway closure. The epiglottis was NOT seen to invert throughout the entirety of the study, allowing for very poor/incomplete airway closure. This was made evident by moderate amounts of residuals within the valleculae, pyriform sinuses and along the posterior pharyngeal wall following the swallow. Residuals were seen to only clear in minimal amounts with cued effortful swallows. However, residuals remaining within the pharynx were seen to contribute to further penetration/aspiration events. There was SILENT aspiration of thin liquids via teaspoon, mildly thick liquids via cup AND puree solids, seen to occur during/after the swallow secondary to premature bolus loss, delayed swallow initiation, and very poor airway closure. There was no sensory response to aspirated material. A cued cough and re swallow was not effective in ejecting contrast from the airway. A chin tuck was trialed, however, was not seen to be effective in reducing premature contrast leakage nor seen to eliminate aspiration. There was deep penetration of mildly thick liquids via teaspoon, seen to occur during the swallow secondary to premature bolus loss and delayed swallow initiation w/ poor airway closure, however, contrast material was not seen to completely redirect from the laryngeal vestibule upon completion of the swallow. Residuals remained on the underside of the epiglottis, placing the patient at risk for eventual aspiration. There was silent aspiration with moderately thick  liquids, seen to occur following the swallow secondary to swallow delay, reduced airway closure, and incomplete clearance of residuals from the laryngeal vestibule. Compensations ineffective. Although there was NO response to all aspiration events, the patient did demonstrate a very delayed cough following all events. This in conjunction with compensations trialed were not seen to be effective in ejecting/eliminating aspirated material. ESOPHAGEAL STAGE: Esophageal stage not assessed this date. SPEECH THERAPIST RECOMMENDATIONS: 1. Would initiate goals of care conversation with palliative care consult to better determine patient/family goals of care and best means of nutritional access. In the setting of patient's advanced age and clinical prognosis, may favor initiating comfort feeds with known risk for aspiration, as patient was seen to aspirate across ALL consistencies this date despite compensations and diet texture modifications. 2. SLP to follow up as indicated to further assist GOC conversations and clinical education as indicated.     IMPRESSION: 1. Moderate oral phase. Slow lingual manipulation. Reduced bolus cohesion. Delayed AP transfers. Residuals seen to mostly clear with a subsequent swallow. 2. Profound pharyngeal phase. Delayed swallow initiation, pharyngeal dysmotility, mild to moderate residuals throughout the pharynx, worsened with thicker viscosities. There was SILENT aspiration of thin liquids, mildly thick liquids, moderately thick liquids AND puree solids. Although there was NO response to all aspiration events, the patient did demonstrate a very delayed cough following all events. This in conjunction with compensations trialed were not seen to be effective in ejecting/eliminating aspirated material. 3. Esophageal phase not assessed this date. Avril Nava PA-C was present for this examination. This study was reviewed by Dr. Goyo Burnham. The undersigned radiologist agrees only with the  radiographic findings. Specific swallowing recommendations are solely the purview of the Speech Pathology Division. Arleth Schmitz M.S., CCC-SLP    Telemetry/EKG:  I have independently reviewed the ECG. No significant findings.     ASSESSMENT & PLAN   History of recurrent UTIs  Assessment & Plan  History of frequent UTIs as reported by son  No reported history of renal calculi or BPH however  UA with mild pyuria 35-50 WBC, no bacteria.  No ketones, protein, blood +1 LE.  CT from 08/2023 showed Multiple small nonobstructing right renal calculi  CT from 10/2022 showed prostatic megaly  Presenting today with AMS similar to prior episodes of UTIs    Concern for recurrent UTI unresolved with OP treatment with Bactrim and Cipro.  UA may be partially sterilized now after outpatient ABX.    - Concern for resistant pathogens  - Continue cefepime 1g Q12 per ID  - Ucx 30-39k CFU, but recommend recollection d/t possible contaminants  - Follow-up CT AP to rule out anatomic defects/renal calculi/worsening prostatic megaly with undiagnosed BPH contributing to recurrent UTIs  - Continue finasteride 5mg QD per urology recs  - Should follow-up with urology outpatient with Dr. Wade for BPH treatments    * Encephalopathy  Assessment & Plan  2 weeks of AMS with delusions/hallucinations similar to prior UTI episodes  BUN 29, not significantly elevated.  No leukocytosis, no other electrolyte derangements.  VBG 7.38/40  Recent UTI likely inadequately treated  CT head negative  No toxin exposure    Likely toxic metabolic encephalopathy in the setting of ongoing/undertreated UTI plus dehydration 2/2 poor p.o. intake    - S/p 1 dose ceftaz, continue cefepime for now, narrow as able  - Follow-up B12, folate, TSH for completeness particularly given history of hypothyroidism  - F/u ID consult  - Multi-component delirium prevention (minimize deliriogenic medications, promote OOB, avoid restraints/lines/drains, avoid sensory deprivation, promote  regular sleep-wake cycle, frequent reorientation)  -C/f Aspiration, SLP video study shows profound pharyngeal phase dysphagia. Conversation with patient, family, and palliative care was had, they prefer comfort feeding.  - PT/OT consult    CALI (acute kidney injury) (CMS/[facility])  Assessment & Plan  P/w Cr 2.5 from baseline 1.4, BUN 29  UA with mild pyuria, +1LE  BUN/CR ratio 11    Likely intrinsic renal injury with contribution from hypovolemia/brewing infection    -F/u bladder scan if retaining on bladder scan, straight cath to obtain a urine sample  -Follow-up urine lites including urine urea given recent diuretic use  -Follow-up CT CAP to evaluate for hydro/renal calculi  - Cr 2.4 today    Shortness of breath  Assessment & Plan  P/W 1 day acute SOB at rest  History of recurrent pleural effusions requiring L sided thoracentesis  VBG without hypercarbia or hypoxia.  No leukocytosis or acute anemia  Euvolemic to dry on exam, saturating appropriately on room air  CXR with worsening left pleural effusion    - Hold off on thoracentesis for now as he is on room air and comfortable  - S/p 1 dose 60 mg Lasix in the ED, would hold off on further diuretics given concern for  infection  - Lower concern for PNA given lack of focal consolidation on imaging, leukocytosis or PNA symptoms  - Monitor respiratory symptoms and O2 needs closely  - Continue doxycycline 100mg BID for possible atypical pneumonia per ID recs  - F/u conversation with family regarding thoracentesis    Hypertension  Assessment & Plan  Presenting with BP 110s to 120s/70s  Home meds include amlodipine, metoprolol, torsemide    - Resume as long as BP tolerates    A-fib (CMS/[facility])  Assessment & Plan  History of A-fib maintained on metoprolol and not AC  Reported history of GI bleeds    - Rate controlled in the ED  - Follow-up EKG, unable to locate in the ED  - Continue metoprolol    HFrEF (heart failure with reduced ejection fraction) (CMS/[facility])  Assessment &  Plan  Follows with  at [facility]  Most recent echo in 03/2024 with EF 45 to 50%, severe TR  On admission appears hypovolemic/euvolemic, saturating appropriately on room air  , Trop 22.9--> 23.2.  VBG 7.38/40.  CXR with increasing left pleural effusion    Does not appear to be in CHF exacerbation    - S/p 60 mg Lasix in the ED  - Would hold further diuresis given concern for infection and dehydration contributing to altered mental status  - Continue rest of GDMT including metoprolol given no SIRS/sepsis physiology.  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

## 2024-07-30 NOTE — ED ATTESTATION NOTE
The patient was evaluated and managed by the physician assistant / nurse practitioner.     Susanna Morgan DO  07/30/24 0610

## 2024-07-30 NOTE — HOSPITAL COURSE
History of Present Illness    89yo w/ HFrEF, TR, diverticulitis s/p colostomy, chronic pleural effusion, AF not on AC, history of Justin's gangrene, CAD s/p CABG presents with 2 weeks of altered mental status 1 day of shortness of breath. He has a history of altered mental status with UTI and was treated as an outpatient with Cipro and Bactrim based on positive urine dip strips without culture sent without improvement in his symptoms. On presentation noted to have CALI.     In the ED, his vitals were: /60, pulse 68, RR 16. Labs were WBC 5.68, hgb 11, K 5.2, Cr 2.5, BUN 29.  CT Head was negative  CXR showed increasing left pleural effusion  He was given 1x Ceftaz and 1x 60mg lasix.    Hospital Course    #Metabolic encephalopathy  Repeat UA showed mild pyuria, 35-50 WBC, no bacteria, and urine culture showed 30-39k cfus. Likely caused by UTI, but cannot rule out potential PNA as well as CALI. CT was obtained and did not show any obvious etiologies. History of similar encephalopathic episodes with UTIs, and symptoms have been improving since admission and with use of antbiotics.  - Please follow up with outpatient PCP    #Possible UTI  Ceftaz was started in the ED and was transitioned to cefepime per ID recs. He was treated as outpatient with cipro and bactrim so urine may be partially sterile. Urology was consulted and they started him on finasteride and recommend outpatient followup.  - Please follow up with outpatient urology    #Dysphagia  Failed a VFSS with speech language pathology. Palliative was consulted, and along with family, they elected to allow patient to eat for comfort.    #Dyspnea  Symptoms improved over the course of his stay. CT showing moderate to large L effusion which was been noted on prior imaging with prior exudative taps without clear etiology. Discussion about diagnostic/therapeutic thoracentesis is being done with family, and we are covering for potential pneumonia with  cefepime.    #CALI  Creatinine baseline about 1.4-1.7, with it being 2.5 on admission (though was 2.1 on 5/31). Likely prerenal in setting of infection and developed ATN, but antibiotics he received (bactrim) could have contributed.  - Please follow up with outpatient PCP

## 2024-07-31 ENCOUNTER — APPOINTMENT (INPATIENT)
Dept: RADIOLOGY | Facility: HOSPITAL | Age: 89
DRG: 689 | End: 2024-07-31
Payer: MEDICARE

## 2024-07-31 ENCOUNTER — APPOINTMENT (INPATIENT)
Dept: RADIOLOGY | Facility: HOSPITAL | Age: 89
DRG: 689 | End: 2024-07-31
Attending: PHYSICIAN ASSISTANT
Payer: MEDICARE

## 2024-07-31 LAB
ANION GAP SERPL CALC-SCNC: 10 MEQ/L (ref 3–15)
APPEARANCE FLD: ABNORMAL
BODY FLD TYPE: ABNORMAL
BUN SERPL-MCNC: 32 MG/DL (ref 7–25)
CALCIUM SERPL-MCNC: 8.5 MG/DL (ref 8.6–10.3)
CHLORIDE SERPL-SCNC: 106 MEQ/L (ref 98–107)
CO2 SERPL-SCNC: 22 MEQ/L (ref 21–31)
COLOR FLD: YELLOW
CREAT SERPL-MCNC: 2.1 MG/DL (ref 0.7–1.3)
EGFRCR SERPLBLD CKD-EPI 2021: 29 ML/MIN/1.73M*2
ERYTHROCYTE [DISTWIDTH] IN BLOOD BY AUTOMATED COUNT: 18.6 % (ref 11.6–14.4)
GLUCOSE FLD-MCNC: 115 MG/DL
GLUCOSE SERPL-MCNC: 79 MG/DL (ref 70–99)
HCT VFR BLD AUTO: 33.8 % (ref 40.1–51)
HGB BLD-MCNC: 10.7 G/DL (ref 13.7–17.5)
INR PPP: 1.5
LDH FLD L TO P-CCNC: 112 U/L
LYMPHOCYTES NFR FLD MANUAL: 87 %
MAGNESIUM SERPL-MCNC: 1.9 MG/DL (ref 1.8–2.5)
MCH RBC QN AUTO: 32 PG (ref 28–33.2)
MCHC RBC AUTO-ENTMCNC: 31.7 G/DL (ref 32.2–36.5)
MCV RBC AUTO: 101.2 FL (ref 83–98)
MONOS+MACROS NFR FLD MANUAL: 11 %
NEUTROPHILS NFR FLD MANUAL: 2 %
PDW BLD AUTO: 10.8 FL (ref 9.4–12.4)
PH FLD: 7.5 [PH]
PLATELET # BLD AUTO: 115 K/UL (ref 150–350)
POTASSIUM SERPL-SCNC: 4.1 MEQ/L (ref 3.5–5.1)
PROT FLD-MCNC: 4.7 G/DL
PROTHROMBIN TIME: 18 SEC (ref 12.2–14.5)
RBC # BLD AUTO: 3.34 M/UL (ref 4.5–5.8)
RBC # SNV: ABNORMAL CELLS/CU MM (ref 0–10000)
SODIUM SERPL-SCNC: 138 MEQ/L (ref 136–145)
SPECIMEN SOURCE: ABNORMAL
WBC # BLD AUTO: 4.9 K/UL (ref 3.8–10.5)
WBC # SNV AUTO: 1046 CELLS/CU MM (ref 0–200)

## 2024-07-31 PROCEDURE — 87205 SMEAR GRAM STAIN: CPT

## 2024-07-31 PROCEDURE — 71045 X-RAY EXAM CHEST 1 VIEW: CPT

## 2024-07-31 PROCEDURE — 36100330 IR THORACENTESIS

## 2024-07-31 PROCEDURE — 36415 COLL VENOUS BLD VENIPUNCTURE: CPT

## 2024-07-31 PROCEDURE — 87102 FUNGUS ISOLATION CULTURE: CPT

## 2024-07-31 PROCEDURE — 25000000 HC PHARMACY GENERAL: Performed by: PHYSICIAN ASSISTANT

## 2024-07-31 PROCEDURE — 0W9B3ZX DRAINAGE OF LEFT PLEURAL CAVITY, PERCUTANEOUS APPROACH, DIAGNOSTIC: ICD-10-PCS | Performed by: RADIOLOGY

## 2024-07-31 PROCEDURE — 83986 ASSAY PH BODY FLUID NOS: CPT

## 2024-07-31 PROCEDURE — 97535 SELF CARE MNGMENT TRAINING: CPT | Mod: GO

## 2024-07-31 PROCEDURE — C1729 CATH, DRAINAGE: HCPCS

## 2024-07-31 PROCEDURE — 25800000 HC PHARMACY IV SOLUTIONS

## 2024-07-31 PROCEDURE — 99233 SBSQ HOSP IP/OBS HIGH 50: CPT | Performed by: HOSPITALIST

## 2024-07-31 PROCEDURE — 80048 BASIC METABOLIC PNL TOTAL CA: CPT

## 2024-07-31 PROCEDURE — 84157 ASSAY OF PROTEIN OTHER: CPT

## 2024-07-31 PROCEDURE — 89050 BODY FLUID CELL COUNT: CPT

## 2024-07-31 PROCEDURE — 63600000 HC DRUGS/DETAIL CODE

## 2024-07-31 PROCEDURE — 85610 PROTHROMBIN TIME: CPT

## 2024-07-31 PROCEDURE — 83735 ASSAY OF MAGNESIUM: CPT

## 2024-07-31 PROCEDURE — 88112 CYTOPATH CELL ENHANCE TECH: CPT

## 2024-07-31 PROCEDURE — 63700000 HC SELF-ADMINISTRABLE DRUG

## 2024-07-31 PROCEDURE — 82945 GLUCOSE OTHER FLUID: CPT

## 2024-07-31 PROCEDURE — 63600000 HC DRUGS/DETAIL CODE: Mod: JZ

## 2024-07-31 PROCEDURE — 93005 ELECTROCARDIOGRAM TRACING: CPT

## 2024-07-31 PROCEDURE — 21400000 HC ROOM AND CARE CCU/INTERMEDIATE

## 2024-07-31 PROCEDURE — 87116 MYCOBACTERIA CULTURE: CPT

## 2024-07-31 PROCEDURE — 97166 OT EVAL MOD COMPLEX 45 MIN: CPT | Mod: GO

## 2024-07-31 PROCEDURE — 87206 SMEAR FLUORESCENT/ACID STAI: CPT

## 2024-07-31 PROCEDURE — 87015 SPECIMEN INFECT AGNT CONCNTJ: CPT

## 2024-07-31 PROCEDURE — 25000000 HC PHARMACY GENERAL

## 2024-07-31 PROCEDURE — 87070 CULTURE OTHR SPECIMN AEROBIC: CPT

## 2024-07-31 PROCEDURE — 83615 LACTATE (LD) (LDH) ENZYME: CPT

## 2024-07-31 PROCEDURE — 85027 COMPLETE CBC AUTOMATED: CPT

## 2024-07-31 RX ORDER — LIDOCAINE HYDROCHLORIDE 10 MG/ML
INJECTION, SOLUTION INFILTRATION; PERINEURAL
Status: COMPLETED | OUTPATIENT
Start: 2024-07-31 | End: 2024-07-31

## 2024-07-31 RX ADMIN — FAMOTIDINE 10 MG: 20 TABLET, FILM COATED ORAL at 08:48

## 2024-07-31 RX ADMIN — HEPARIN SODIUM 5000 UNITS: 5000 INJECTION, SOLUTION INTRAVENOUS; SUBCUTANEOUS at 21:05

## 2024-07-31 RX ADMIN — ANTACID TABLETS 200 MG OF ELEMENTAL CALCIUM: 500 TABLET, CHEWABLE ORAL at 20:11

## 2024-07-31 RX ADMIN — THIAMINE HCL TAB 100 MG 50 MG: 100 TAB at 08:46

## 2024-07-31 RX ADMIN — DOXYCYCLINE 100 MG: 100 INJECTION, POWDER, LYOPHILIZED, FOR SOLUTION INTRAVENOUS at 18:09

## 2024-07-31 RX ADMIN — ESCITALOPRAM OXALATE 5 MG: 5 TABLET, FILM COATED ORAL at 08:48

## 2024-07-31 RX ADMIN — GENTAMICIN SULFATE 1 DROP: 3 SOLUTION OPHTHALMIC at 21:05

## 2024-07-31 RX ADMIN — ANTACID TABLETS 200 MG OF ELEMENTAL CALCIUM: 500 TABLET, CHEWABLE ORAL at 08:49

## 2024-07-31 RX ADMIN — TAMSULOSIN HYDROCHLORIDE 0.4 MG: 0.4 CAPSULE ORAL at 08:48

## 2024-07-31 RX ADMIN — HEPARIN SODIUM 5000 UNITS: 5000 INJECTION, SOLUTION INTRAVENOUS; SUBCUTANEOUS at 14:32

## 2024-07-31 RX ADMIN — METOPROLOL SUCCINATE 25 MG: 25 TABLET, EXTENDED RELEASE ORAL at 17:31

## 2024-07-31 RX ADMIN — LEVOTHYROXINE SODIUM 100 MCG: 0.1 TABLET ORAL at 05:31

## 2024-07-31 RX ADMIN — CEFTRIAXONE SODIUM 2 G: 2 INJECTION, POWDER, FOR SOLUTION INTRAMUSCULAR; INTRAVENOUS at 17:31

## 2024-07-31 RX ADMIN — CLOPIDOGREL 75 MG: 75 TABLET ORAL at 08:48

## 2024-07-31 RX ADMIN — Medication 3 MG: at 21:05

## 2024-07-31 RX ADMIN — TOBRAMYCIN 1 DROP: 3 SOLUTION/ DROPS OPHTHALMIC at 20:12

## 2024-07-31 RX ADMIN — LIDOCAINE HYDROCHLORIDE 10 ML: 10 INJECTION, SOLUTION INFILTRATION; PERINEURAL at 13:14

## 2024-07-31 RX ADMIN — PANTOPRAZOLE SODIUM 20 MG: 20 TABLET, DELAYED RELEASE ORAL at 08:48

## 2024-07-31 RX ADMIN — GENTAMICIN SULFATE 1 DROP: 3 SOLUTION OPHTHALMIC at 14:36

## 2024-07-31 RX ADMIN — Medication 500 MCG: at 08:49

## 2024-07-31 RX ADMIN — MAGNESIUM SULFATE IN DEXTROSE 1 G: 10 INJECTION, SOLUTION INTRAVENOUS at 12:07

## 2024-07-31 RX ADMIN — FINASTERIDE 5 MG: 5 TABLET, FILM COATED ORAL at 08:47

## 2024-07-31 RX ADMIN — ANTACID TABLETS 200 MG OF ELEMENTAL CALCIUM: 500 TABLET, CHEWABLE ORAL at 14:32

## 2024-07-31 RX ADMIN — GENTAMICIN SULFATE 1 DROP: 3 SOLUTION OPHTHALMIC at 05:06

## 2024-07-31 RX ADMIN — METHENAMINE HIPPURATE 1 G: 1 TABLET ORAL at 12:19

## 2024-07-31 RX ADMIN — DOXYCYCLINE 100 MG: 100 INJECTION, POWDER, LYOPHILIZED, FOR SOLUTION INTRAVENOUS at 05:05

## 2024-07-31 RX ADMIN — HEPARIN SODIUM 5000 UNITS: 5000 INJECTION, SOLUTION INTRAVENOUS; SUBCUTANEOUS at 05:06

## 2024-07-31 ASSESSMENT — COGNITIVE AND FUNCTIONAL STATUS - GENERAL
DRESSING REGULAR UPPER BODY CLOTHING: 2 - A LOT
WALKING IN HOSPITAL ROOM: 2 - A LOT
EATING MEALS: 3 - A LITTLE
HELP NEEDED FOR BATHING: 2 - A LOT
HELP NEEDED FOR PERSONAL GROOMING: 3 - A LITTLE
STANDING UP FROM CHAIR USING ARMS: 2 - A LOT
CLIMB 3 TO 5 STEPS WITH RAILING: 2 - A LOT
TOILETING: 1 - TOTAL
DRESSING REGULAR LOWER BODY CLOTHING: 2 - A LOT
MOVING TO AND FROM BED TO CHAIR: 3 - A LITTLE
AFFECT: WFL

## 2024-07-31 NOTE — PROGRESS NOTES
Infectious Disease Progress Note    Patient Name: Samy Elena  MR#: [id number]  : 10/6/1930  Admission Date: 2024  Date: 24   Time: 10:19 AM   Author: Josh Foote DO        Antibiotics:  Anti-infectives (From admission, onward)      Start     Dose/Rate Route Frequency Ordered Stop    24 2100  tobramycin (TOBREX) 0.3 % ophthalmic solution 1 drop (PT-OWN)         1 drop Right Eye Daily 24 2156      24 1715  cefTRIAXone (ROCEPHIN) IVPB 2 g in 100 mL NSS vial in bag         2 g  200 mL/hr over 30 Minutes intravenous Every 24 hours interval 24 1627      24 1730  doxycycline (VIBRAMYCIN) 100 mg in sodium chloride 0.9 % 100 mL IVPB         100 mg  100 mL/hr over 60 Minutes intravenous Every 12 hours interval 24 1544      24 1130  methenamine (HIPREX) tablet 1 g         1 g oral Daily before lunch 24 1930      24 2200  gentamicin (GARAMYCIN) 0.3 % ophthalmic solution 1 drop         1 drop Right Eye Every 8 hours 24 193                Subjective   Patient seen and examined at bedside.  Reports that he continues to feel improved at this time.     Objective     Vital Signs:    Vitals:    24 0332 24 0523 24 0730 24 0759   BP:   103/62 102/63   BP Location:   Right upper arm Right upper arm   Patient Position:   Sitting Lying   Pulse: 62  60 61   Resp:   20    Temp:   36.5 °C (97.7 °F)    TempSrc:   Oral    SpO2:   95% 95%   Weight:  68.4 kg (150 lb 12.7 oz)     Height:         Temp (72hrs), Av.6 °C (97.8 °F), Min:36.3 °C (97.3 °F), Max:36.8 °C (98.2 °F)      Physical Exam:  General: non toxic, alert  HEENT: NC/AT/ anicteric sclera  Neck: supple, no meningismus  Cardiovascular: S1/S2 present.  +murmur  Respiratory: clear to auscultation  GI/Abdomen: soft, NT/ND,  no peritoneal signs, +colostomy, no sacral wound  : +condom cath  Extremities: no clubbing, cyanosis, or edema  JOINTS:  No swelling,  erythema, or pain  Lymphatics: no lymphadenopathy  Neurology: no focal deficits, hard of heraing  Psych: cooperative with exam  Skin: no rashes      Lines, Drains, Airways, Wounds:  Peripheral IV (Adult) Anterior;Distal;Left Forearm (Active)   Number of days:        External Urinary Catheter Medium (Active)   Number of days: 2       Labs:    CBC Results         07/30/24 07/29/24 07/28/24     0343 0524 1229    WBC 4.84 4.73 5.68    RBC 3.50 3.63 3.47    HGB 11.0 11.7 11.0    HCT 34.6 38.0 34.8    MCV 98.9 104.7 100.3    MCH 31.4 32.2 31.7    MCHC 31.8 30.8 31.6     121 125          CMP Results         07/30/24 07/29/24 07/28/24     0343 0524 1229     138 138    K 4.2 4.2 5.2    Cl 104 104 106    CO2 25 22 22    Glucose 100 76 116    BUN 32 32 29    Creatinine 2.4 2.6 2.5    Calcium 8.6 9.1 9.4    Anion Gap 9 12 10    AST -- -- 42    ALT -- -- 7    Albumin -- -- 3.7    EGFR 24.7 22.4 23.5           Comment for K at 1229 on 07/28/24: Results obtained on plasma. Plasma Potassium values may be up to 0.4 mEQ/L less than serum values. The differences may be greater for patients with high platelet or white cell counts.    Comment for EGFR at 0343 on 07/30/24: Calculation based on the Chronic Kidney Disease Epidemiology Collaboration (CKD-EPI) equation refit without adjustment for race.    Comment for EGFR at 0524 on 07/29/24: Calculation based on the Chronic Kidney Disease Epidemiology Collaboration (CKD-EPI) equation refit without adjustment for race.    Comment for EGFR at 1229 on 07/28/24: Calculation based on the Chronic Kidney Disease Epidemiology Collaboration (CKD-EPI) equation refit without adjustment for race.              MICRO:    Microbiology Results       Procedure Component Value Units Date/Time    MRSA Screen, Nares Only Nose [[id number]]  (Normal) Collected: 07/29/24 1511    Specimen: Nasal Swab from Nose Updated: 07/29/24 1810     MRSA DNA, Nares Negative    SARS-COV-2 (COVID-19)/ FLU A/B, AND  "RSV, PCR Nasopharynx [[id number]]  (Normal) Collected: 07/28/24 1320    Specimen: Nasopharyngeal Swab from Nasopharynx Updated: 07/28/24 1425     SARS-CoV-2 (COVID-19) Negative     Influenza A Negative     Influenza B Negative     Respiratory Syncytial Virus Negative    Narrative:      Testing performed using real-time PCR for detection of COVID-19. EUA approved validation studies performed on site.     Urine culture Urine, Clean Catch [[id number]] Collected: 07/28/24 1320    Specimen: Urine, Clean Catch Updated: 07/29/24 1228     Urine Culture Probable contaminants, suggest recollection      30,000-39,000 cfu/mL Mixed Growth            Assessment  89 yo M hx HFrEF, nonrheumatic TR, A-fib not on AC due to GIB, HTN, prior diverticulitis s/p colostomy, recurrent pleural effusions requiring multiple thoracenteses, prior Justin's, CAD s/p CABG who presented to care for shortness of breath. Currently improved.     ID-related problems     Encephalopathy in setting of shortness of breath and C/F obstruction v UTI v PNA-VSS.  WBC 5.  UA WBC 35-50.  Encephalopathy was reported as his \"symptom\" for a urinary tract infection. CT A/P w/ L effusion and PRINCESS consolidation.  Prior UCX 11/2022 with relatively susceptible K pneumo and Pseudomonas. . Ucx w/ mixed growth 30-39 cfus. I am less suspicious of UTI as driving cause of his SOB/admit.   SOB- improved post diuresis. Cxray w/ increased size of L pleural effusion. +PRINCESS consolidation. +aspiration risk.  Ceftriaxone can provide adequate coverage in the setting of aspiration.     Recommendations     C/w ceftriaxone 2g q24H to cover for PNA. Would plan on 5 day course for CAP. If continues to improve and is discharged prior to completion of therapy, would change to po cefuroxime 250mg BID (07/29-EOT08/02)  C/w doxycycline 100mg BID for coverage of possible atypical PNA. Would plan on 5 day course for CAP (07/29-EOT08/02)  Would discuss w/ urology given c/f component of " urinary obstruction in setting of BPH.   Low threshold for repeat L thora. If performed, please obtain fluid analysis, aerobic/anaerobic, AFB, fungal cx.   Fu Ucx/BCx    ID will sign off at this time. Please call or page if any new microbiologic data, change in clinical status or with any questions/concerns.    Josh Foote DO  [facility]

## 2024-07-31 NOTE — POST-PROCEDURE NOTE
Interventional Radiology Brief Postprocedure Note    Samy Elena     Attending: SOPHIA Seals / Goyo Burnham M.D.    Assistant: n/a    Diagnosis: sob    Description of procedure: US guided left thoracentesis    Contrast: none     Anesthesia:  Local (Lidocaine)    Volume of Lidocaine Utilized (ml): 10ml     Medications: none     Complications: None      Estimated Blood Loss: Estimated Blood Loss: 0-10 ml    Anticoagulation: n/a    Specimens: 700 ml clear yellow fluid    Findings: Successful US guided left thoracentesis with 700 ml removed. Large effusion remains, stopped due to patient discomfort. CXR pending.    7/31/2024 1:24 PM

## 2024-07-31 NOTE — PLAN OF CARE
Plan of Care Review  Plan of Care Reviewed With: patient  Progress: improving  Outcome Evaluation: Pt is A&Ox4. pt had thoracentsis done, bandaid in pllace, site is clean dry and in intact. pt had some episodes of vomiting, no complaints of nausea. MD notified and made aware. BPs run soft. pt voiding in the urinal. call bell in reach

## 2024-07-31 NOTE — PROGRESS NOTES
Internal Medicine  Daily Progress Note       SUBJECTIVE   This is a 89 y.o. year-old male admitted on 7/28/2024 with Lower urinary tract infection [N39.0]  Pleural effusion on left [J90]  Acute encephalopathy [G93.40]  Acute on chronic congestive heart failure, unspecified heart failure type (CMS/HCC) [I50.9].    Interval History: No acute events overnight. Denies chest pain, SOB, lightheadedness/palpitations, n/v/d. Says he feels good and energetic this morning.     OBJECTIVE   Vital signs in last 24 hours:  Temp:  [36.5 °C (97.7 °F)-36.8 °C (98.2 °F)] 36.5 °C (97.7 °F)  Heart Rate:  [60-87] 60  Resp:  [16-20] 20  BP: ()/(50-63) 103/62  SpO2:  [95 %-98 %] 95 %  Oxygen Therapy: None (Room air)    Weight & I/Os:  Weights (last 5 days)       Date/Time Weight    07/31/24 0523 68.4 kg (150 lb 12.7 oz)    07/30/24 0554 68.4 kg (150 lb 12.7 oz)    07/29/24 0514 70.5 kg (155 lb 8 oz)    07/29/24 0000 69.2 kg (152 lb 9.6 oz)    07/28/24 1223 70.3 kg (155 lb)            Intake/Output Summary (Last 24 hours) at 7/31/2024 0659  Last data filed at 7/31/2024 0515  24 Hour Net Input/Output from 7AM Yesterday   Intake 460 ml   Output 550 ml   Net -90 ml        PHYSICAL EXAMINATION   Physical Exam  Constitutional:       Appearance: Normal appearance.   Eyes:      Conjunctiva/sclera: Conjunctivae normal.   Cardiovascular:      Rate and Rhythm: Normal rate and regular rhythm.      Pulses: Normal pulses.      Heart sounds: Normal heart sounds.   Pulmonary:      Effort: Pulmonary effort is normal.      Breath sounds: Normal breath sounds.   Abdominal:      General: Bowel sounds are normal.      Palpations: Abdomen is soft.   Skin:     General: Skin is warm and dry.   Neurological:      General: No focal deficit present.      Mental Status: He is alert and oriented to person, place, and time.   Psychiatric:         Mood and Affect: Mood normal.         Behavior: Behavior normal.          LINES, CATHETERS, DRAINS, AIRWAYS, &  WOUNDS   Lines, drains, airways, & wounds:  Peripheral IV (Adult) Anterior;Distal;Left Forearm (Active)   Number of days:        Colostomy Other (comment) LUQ (Active)   Number of days: 728       External Urinary Catheter Medium (Active)   Number of days: 3        LABS, IMAGING, & TELE   Labs:  I have reviewed the patient's labs to the time of note. No new clinical concern.    Results from last 7 days   Lab Units 07/31/24  0512 07/30/24 0343 07/29/24 0524   WBC K/uL 4.90 4.84 4.73   HEMOGLOBIN g/dL 10.7* 11.0* 11.7*   HEMATOCRIT % 33.8* 34.6* 38.0*   PLATELETS K/uL 115* 116* 121*       Results from last 7 days   Lab Units 07/31/24 0512 07/30/24 0343 07/29/24 0524 07/28/24  1229   SODIUM mEQ/L 138 138 138 138   POTASSIUM mEQ/L 4.1 4.2 4.2 5.2*   CHLORIDE mEQ/L 106 104 104 106   CO2 mEQ/L 22 25 22 22   BUN mg/dL 32* 32* 32* 29*   CREATININE mg/dL 2.1* 2.4* 2.6* 2.5*   CALCIUM mg/dL 8.5* 8.6 9.1 9.4   ALBUMIN g/dL  --   --   --  3.7   BILIRUBIN TOTAL mg/dL  --   --   --  1.2   ALK PHOS IU/L  --   --   --  89   ALT IU/L  --   --   --  7   AST IU/L  --   --   --  42*   GLUCOSE mg/dL 79 100* 76 116*     Imaging personally reviewed (does not include unread studies):  No results found.     ASSESSMENT & PLAN   History of recurrent UTIs  Assessment & Plan  History of frequent UTIs as reported by son  No reported history of renal calculi or BPH however  UA with mild pyuria 35-50 WBC, no bacteria.  No ketones, protein, blood +1 LE.  CT from 08/2023 showed Multiple small nonobstructing right renal calculi  CT from 10/2022 showed prostatic megaly  Presenting today with AMS similar to prior episodes of UTIs    Concern for recurrent UTI unresolved with OP treatment with Bactrim and Cipro.  UA may be partially sterilized now after outpatient ABX.    - Concern for resistant pathogens  - Continue ceftriaxone 2g Q24 per ID  - Ucx 30-39k CFU, but recommend recollection d/t possible contaminants  - Follow-up CT AP to rule out  anatomic defects/renal calculi/worsening prostatic megaly with undiagnosed BPH contributing to recurrent UTIs  - Continue finasteride 5mg QD per urology recs  - Should follow-up with urology outpatient with Dr. Wade for BPH treatments    * Encephalopathy  Assessment & Plan  2 weeks of AMS with delusions/hallucinations similar to prior UTI episodes  BUN 29, not significantly elevated.  No leukocytosis, no other electrolyte derangements.  VBG 7.38/40  Recent UTI likely inadequately treated  CT head negative  No toxin exposure    Likely toxic metabolic encephalopathy in the setting of ongoing/undertreated UTI plus dehydration 2/2 poor p.o. intake    - S/p 1 dose ceftaz, narrow as able  - Follow-up B12, folate, TSH for completeness particularly given history of hypothyroidism  - F/u ID consult  - Multi-component delirium prevention (minimize deliriogenic medications, promote OOB, avoid restraints/lines/drains, avoid sensory deprivation, promote regular sleep-wake cycle, frequent reorientation)  -C/f Aspiration, SLP video study shows profound pharyngeal phase dysphagia. Conversation with patient, family, and palliative care was had, they prefer comfort feeding.  - F/u PT/OT    CALI (acute kidney injury) (CMS/[facility])  Assessment & Plan  P/w Cr 2.5 from baseline 1.4, BUN 29  UA with mild pyuria, +1LE  BUN/CR ratio 11    Likely intrinsic renal injury with contribution from hypovolemia/brewing infection    -F/u bladder scan if retaining on bladder scan, straight cath to obtain a urine sample  -Follow-up urine lites including urine urea given recent diuretic use  -Follow-up CT CAP to evaluate for hydro/renal calculi  - Cr 2.1 today, improving    Shortness of breath  Assessment & Plan  P/W 1 day acute SOB at rest  History of recurrent pleural effusions requiring L sided thoracentesis  VBG without hypercarbia or hypoxia.  No leukocytosis or acute anemia  Euvolemic to dry on exam, saturating appropriately on room air  CXR with  worsening left pleural effusion      - S/p 1 dose 60 mg Lasix in the ED, would hold off on further diuretics given concern for  infection  - Lower concern for PNA given lack of focal consolidation on imaging, leukocytosis or PNA symptoms  - Monitor respiratory symptoms and O2 needs closely  - Continue doxycycline 100mg BID for possible atypical pneumonia per ID recs  - Will pursue thoracentesis per family discussion    Hypertension  Assessment & Plan  Presenting with BP 110s to 120s/70s  Home meds include amlodipine, metoprolol, torsemide    - Resume as long as BP tolerates    A-fib (CMS/[facility])  Assessment & Plan  History of A-fib maintained on metoprolol and not AC  Reported history of GI bleeds    - Rate controlled in the ED  - Follow-up EKG, unable to locate in the ED  - Continue metoprolol    HFrEF (heart failure with reduced ejection fraction) (CMS/formerly [facility])  Assessment & Plan  Follows with  at [facility]  Most recent echo in 03/2024 with EF 45 to 50%, severe TR  On admission appears hypovolemic/euvolemic, saturating appropriately on room air  , Trop 22.9--> 23.2.  VBG 7.38/40.  CXR with increasing left pleural effusion    Does not appear to be in CHF exacerbation    - S/p 60 mg Lasix in the ED  - Would hold further diuresis given concern for infection and dehydration contributing to altered mental status  - Continue rest of GDMT including metoprolol given no SIRS/sepsis physiology.  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

## 2024-07-31 NOTE — OR SURGEON
Pre-Procedure patient identification:  I am the primary operating surgeon/proceduralist and I have reviewed the applicable pathology reports and radiology studies for this procedure. I have identified the patient on 07/31/24 at 1:09 PM SOPHIA Seals C

## 2024-07-31 NOTE — PLAN OF CARE
Care Coordination Discharge Plan Note     Discharge Needs Assessment  Concerns to be Addressed: discharge planning, care coordination/care conferences  Current Discharge Risk: chronically ill    Anticipated Discharge Plan  Anticipated Discharge Disposition: home with home health  Type of Home Care Services: home PT, home OT, nursing      Patient Choice  Offered/Gave Vendor List: yes  Patient's Choice of Community Agency(s): [facility]    Patient and/or patient guardian/advocate was made aware of their right to choose a provider. A list of eligible providers was presented and reviewed with the patient and/or patient guardian/advocate in written and/or verbal form. The list delineates providers in the patient’s desired geographic area who are participating in the Medicare program and/or providers contracted with the patient’s primary insurance. The Medicare list and quality ratings were obtained from the Medicare.gov [medicare.gov] website.    ---------------------------------------------------------------------------------------------------------------------    Interdisciplinary Discharge Plan Review:  Participants:     Concerns Comments: Per DPR,will have a thoracentesis today. YAHIR Thursday. [facility] following, has 24/7 HHAs. Spoke to the patient's son Marko states his brother can  around 2pm tomorrow and get services back in place. [facility] will continue to follow for transition of care needs until discharge is complete.    Discharge Plan:   Disposition/Destination: Home Health Care - [facility] / Home  Discharge Facility:    Community Resources:      Discharge Transportation:  Is Out of Hospital DNR needed at Discharge: no  Does patient need discharge transport?

## 2024-07-31 NOTE — PROGRESS NOTES
Occupational Therapy -  Initial Evaluation     Patient: Samy Elena  Location: [place] [address]  MRN: [id number]  Today's date: 7/31/2024    HISTORY OF PRESENT ILLNESS     Samy is a 89 y.o. male admitted on 7/28/2024 with Lower urinary tract infection [N39.0]  Pleural effusion on left [J90]  Acute encephalopathy [G93.40]  Acute on chronic congestive heart failure, unspecified heart failure type (CMS/HCC) [I50.9]. Principal problem is Encephalopathy.    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    History of Present Illness  89-year-old male with history of HFrEF, HTN, prior diverticulitis status post colostomy, recurrent pleural effusions requiring multiple thoracentesis tubes CAD status post CABG who presented this admission with shortness of breath, confusion, and hallucinations over the last 2 weeks.    PRIOR LEVEL OF FUNCTION AND LIVING ENVIRONMENT     Prior Level of Function      Flowsheet Row Most Recent Value   Dominant Hand right   Ambulation assistive equipment and person   Transferring assistive equipment and person   Toileting assistive person   Bathing assistive equipment and person   Dressing assistive person   Eating independent   IADLs assistive person   Communication understands/communicates without difficulty   Swallowing difficulty swallowing liquids/foods   Baseline Diet/Method of Nutritional Intake thin liquids, regular  [however known to aspirate with thins via straw]   Past History of Dysphagia Previous SLP history, last seen on 3/28/2024 with recommendations to pursue regular solids/thin  liquid diet.  Patient with x 3 VFSS completed previously, most recently on 1/5/2023 with aspiration of thin liquids via straw observed.  When utilized with a chin tuck, there was a sensory response.  No aspiration was observed with single cup sips of thin liquids, mildly thick liquids and solids.  Patient now readmitted with 2 weeks of altered mental status and shortness of breath as well as recent UTI dx.   Prior Level of Function Comment pt has 24/7 HHA assists c ADLs, IALs, transfers, and amb c RW   Assistive Device Currently Used at Home wheelchair, walker, standard, scale             Prior Living Environment      Flowsheet Row Most Recent Value   People in Home other (see comments)   Current Living Arrangements home   Home Accessibility ramp to enter home   Living Environment Comment 2 SH c ramp to enter, 1st floor set up c hosp bed, tub shower + chair,  24/7 HHA            VITALS AND PAIN     OT Vitals      Date/Time Pulse HR Source SpO2 Pt Activity O2 Therapy BP BP Location BP Method Pt Position [facility]   07/31/24 0759 61 Monitor 95 % At rest None (Room air) 102/63 Right upper arm Automatic Lying JLD          OT Pain      Date/Time Pain Type Rating: Rest Rating: Activity [facility]   07/31/24 0759 Pain Reassessment 0 - no pain 0 - no pain JLD             Objective   OBJECTIVE     Start time:  0757  End time:  0822  Session Length: 25 min       General Observations  Patient received reclined, in bed. He was agreeable to therapy.      Precautions: fall       Limitations/Impairments: safety/cognitive   Services  Do You Speak a Language Other Than English at Home?: no  Preferred Language: English      OT Eval and Treat - 07/31/24 0757          Cognition    Orientation Status oriented x 3     Affect/Mental Status WFL     Follows Commands follows one-step commands;75-90% accuracy;increased processing time needed;verbal cues/prompting required;delayed response/completion     Cognitive Function executive function  deficit;safety deficit;memory deficit;attention deficit     Attention Deficit minimal deficit;concentration     Executive Function Deficit minimal deficit;judgment;initiation;abstract thinking;organization/sequencing     Memory Deficit minimal deficit;short-term memory     Safety Deficit minimal deficit;judgment;problem-solving;insight into deficits/self-awareness        Vision Assessment/Intervention    Vision Assessment WFL;corrective lenses full-time        Hearing Assessment    Hearing Status WFL;hearing impairment, bilaterally;hearing aid, bilateral        Sensory Assessment    Sensory Assessment sensation intact, upper extremities        Upper Extremity Assessment    UE Assessment ROM and Strength WFL        Bed Mobility    Bed Mobility Activities supine to sit;sit to supine;left     [place] minimum assist (75% or more patient effort)     Safety/Cues verbal cues;increased time to complete;technique;sequencing;hand placement     Assistive Device bed rails;head of bed elevated        Sit/Stand Transfer    Surface edge of bed;elevated bed     [place] moderate assist (50-74% patient effort)     Safety/Cues verbal cues;increased time to complete;sequencing;technique;hand placement     Assistive Device walker, front-wheeled        Functional Mobility    Distance few steps at bedside     Functional Mobility [place] moderate assist (50-74% patient effort)     Safety/Cues verbal cues;increased time to complete;sequencing;technique;initiation;hand placement     Assistive Device walker, front-wheeled        Upper Body Dressing    Tasks don;pajama/robe     [place] moderate assist (50-74% patient effort)     Safety/Cues increased time to complete     Position edge of bed sitting     Adaptive Equipment none        Lower Body Dressing    Tasks don;doff;socks     [place] maximum assist (25-49% patient effort)     Safety/Cues increased time to complete     Position sitting up in bed     Adaptive  Equipment none        Grooming    Tasks oral care (brushing teeth, cleaning dentures)     [place] close supervision;set up     Safety/Cues increased time to complete     Position sitting up in bed     Setup Assistance adaptive equipment set-up     Adaptive Equipment none        Toileting    Tasks perform bladder hygiene     [place] dependent (less than 25% patient effort)     Adaptive Equipment catheter, external        Balance    Static Sitting Balance WFL;unsupported;sitting, edge of bed        Impairments/Safety Issues    Impairments Affecting Function balance;strength;endurance/activity tolerance     Safety Issues Affecting Function judgment;insight into deficits/self-awareness                                    Education Documentation  Self-Care, taught by Rabia Mcbride OT at 7/31/2024 10:04 AM.  Learner: Patient  Readiness: Acceptance  Method: Explanation  Response: Verbalizes Understanding  Comment: role of OT, ADLs        Session Outcome  Patient upright, in bed at end of session, bed alarm on, personal items in reach, all needs met, call light in reach. Nursing notified about patient's performance, patient's position, and patient's response to therapy/activity.    AM-PAC™ - ADL (Current Function)     Putting on/taking off regular lower body clothing 2 - A Lot   Bathing 2 - A Lot   Toileting 1 - Total   Putting on/taking off regular upper body clothing 2 - A Lot   Help for taking care of personal grooming 3 - A Little   Eating meals 3 - A Little   AM-PAC™ ADL Score 13      ASSESSMENT AND PLAN     Progress Summary  OT Eval, pt Robbi supine<>sit, good sitting bal EOB, maxA LB dressing to lala/doff socks, Robbi UB dressing, modA sit<>stand, modA few steps toward HOB, close sup grooming to brush teeth; pt limited by decr strength, decr endurance, decr act luisa, decr standing luisa/bal, and overall deconditioning, rec contin skiled OT services, rec home c 24/7 HHA and home therapy upon  d/c    Patient/Family Therapy Goal Statement: to go home    OT Plan      Flowsheet Row Most Recent Value   Rehab Potential good, to achieve stated therapy goals at 07/31/2024 0757   Therapy Frequency 4 times/wk at 07/31/2024 0757   Planned Therapy Interventions activity tolerance training, transfer/mobility retraining, patient/caregiver education/training, adaptive equipment training, functional balance retraining, occupation/activity based interventions, BADL retraining, strengthening exercise at 07/31/2024 0757            OT Discharge Recommendations      Flowsheet Row Most Recent Value   OT Recommended Discharge Disposition home with home health, home with assistance at 07/31/2024 0757   Anticipated Equipment Needs if Discharged Home (OT) bathing equipment at 07/31/2024 0757                 OT Goals      Flowsheet Row Most Recent Value   Bed Mobility Goal 1    Activity/Assistive Device bed mobility activities, all at 07/31/2024 0757   [place] supervision required at 07/31/2024 0757   Time Frame by discharge at 07/31/2024 0757   Progress/Outcome goal ongoing at 07/31/2024 0757   Transfer Goal 1    Activity/Assistive Device all transfers at 07/31/2024 0757   [place] supervision required at 07/31/2024 0757   Time Frame by discharge at 07/31/2024 0757   Progress/Outcome goal ongoing at 07/31/2024 0757   Dressing Goal 1    [place] supervision required at 07/31/2024 0757   Time Frame by discharge at 07/31/2024 0757   Progress/Outcome goal ongoing at 07/31/2024 0757   Toileting Goal 1    Activity/Assistive Device toileting skills, all at 07/31/2024 0757   [place] supervision required at 07/31/2024 0757   Time Frame by discharge at 07/31/2024 0757   Progress/Outcome goal ongoing at 07/31/2024 0757   Grooming Goal 1    Activity/Assistive Device grooming skills, all at 07/31/2024 0757   [place] modified independence at 07/31/2024 0757   Time Frame by discharge at 07/31/2024 0757   Progress/Outcome  goal ongoing at 07/31/2024 0757

## 2024-07-31 NOTE — PROGRESS NOTES
Patient: Samy Elena  Location: [place] [address]  MRN:  [id number]  Today's date:  7/31/2024    Attempted to see patient for therapy. Unable due to patient unavailable. Pt currently off the floor at IR. PT will follow as able.

## 2024-07-31 NOTE — PLAN OF CARE
Plan of Care Review  Plan of Care Reviewed With: patient  Progress: no change  Outcome Evaluation: AAOx3. Denies pain. AFib on heart monitor. Room air and lungs are diminished. [place] in place. Call bell within reach.    Problem: Violence Risk or Actual  Goal: Anger and Impulse Control  Outcome: Progressing     Problem: Adult Inpatient Plan of Care  Goal: Plan of Care Review  Outcome: Progressing  Flowsheets (Taken 7/31/2024 0334)  Progress: no change  Outcome Evaluation: AAOx3. Denies pain. AFib on heart monitor. Room air and lungs are diminished. [place] in place. Call bell within reach.  Plan of Care Reviewed With: patient  Goal: Patient-Specific Goal (Individualized)  Outcome: Progressing  Goal: Absence of Hospital-Acquired Illness or Injury  Outcome: Progressing  Goal: Optimal Comfort and Wellbeing  Outcome: Progressing  Goal: Readiness for Transition of Care  Outcome: Progressing     Problem: Infection  Goal: Absence of Infection Signs and Symptoms  Outcome: Progressing     Problem: Skin Injury Risk Increased  Goal: Skin Health and Integrity  Outcome: Progressing     Problem: Fall Injury Risk  Goal: Absence of Fall and Fall-Related Injury  Outcome: Progressing     Problem: Swallowing Impairment  Goal: Optimal Eating and Swallowing Without Aspiration  Outcome: Progressing

## 2024-08-01 VITALS
HEIGHT: 73 IN | SYSTOLIC BLOOD PRESSURE: 93 MMHG | WEIGHT: 151.01 LBS | TEMPERATURE: 97.3 F | DIASTOLIC BLOOD PRESSURE: 69 MMHG | HEART RATE: 69 BPM | RESPIRATION RATE: 18 BRPM | BODY MASS INDEX: 20.01 KG/M2 | OXYGEN SATURATION: 96 %

## 2024-08-01 LAB
ANION GAP SERPL CALC-SCNC: 10 MEQ/L (ref 3–15)
ATRIAL RATE: 83
BUN SERPL-MCNC: 29 MG/DL (ref 7–25)
CALCIUM SERPL-MCNC: 8.4 MG/DL (ref 8.6–10.3)
CASE RPRT: NORMAL
CHLORIDE SERPL-SCNC: 106 MEQ/L (ref 98–107)
CO2 SERPL-SCNC: 20 MEQ/L (ref 21–31)
CREAT SERPL-MCNC: 1.9 MG/DL (ref 0.7–1.3)
EGFRCR SERPLBLD CKD-EPI 2021: 32.7 ML/MIN/1.73M*2
ERYTHROCYTE [DISTWIDTH] IN BLOOD BY AUTOMATED COUNT: 18.4 % (ref 11.6–14.4)
FUNGUS STAIN: NORMAL
GLUCOSE SERPL-MCNC: 82 MG/DL (ref 70–99)
HCT VFR BLD AUTO: 32.6 % (ref 40.1–51)
HGB BLD-MCNC: 10.5 G/DL (ref 13.7–17.5)
LDH SERPL L TO P-CCNC: 143 IU/L (ref 98–271)
MAGNESIUM SERPL-MCNC: 2 MG/DL (ref 1.8–2.5)
MCH RBC QN AUTO: 31.8 PG (ref 28–33.2)
MCHC RBC AUTO-ENTMCNC: 32.2 G/DL (ref 32.2–36.5)
MCV RBC AUTO: 98.8 FL (ref 83–98)
PATH REPORT.FINAL DX SPEC: NORMAL
PATH REPORT.GROSS SPEC: NORMAL
PDW BLD AUTO: 11 FL (ref 9.4–12.4)
PLATELET # BLD AUTO: 117 K/UL (ref 150–350)
POTASSIUM SERPL-SCNC: 4.3 MEQ/L (ref 3.5–5.1)
QRS DURATION: 180
QT INTERVAL: 512
QTC CALCULATION(BAZETT): 528
R AXIS: 56
RBC # BLD AUTO: 3.3 M/UL (ref 4.5–5.8)
RHODAMINE-AURAMINE STN SPEC: NORMAL
SODIUM SERPL-SCNC: 136 MEQ/L (ref 136–145)
T WAVE AXIS: -23
VENTRICULAR RATE: 64
WBC # BLD AUTO: 4.8 K/UL (ref 3.8–10.5)

## 2024-08-01 PROCEDURE — 93010 ELECTROCARDIOGRAM REPORT: CPT | Performed by: INTERNAL MEDICINE

## 2024-08-01 PROCEDURE — 200200 PR NO CHARGE: Performed by: HOSPITALIST

## 2024-08-01 PROCEDURE — 63700000 HC SELF-ADMINISTRABLE DRUG

## 2024-08-01 PROCEDURE — 25800000 HC PHARMACY IV SOLUTIONS

## 2024-08-01 PROCEDURE — 97116 GAIT TRAINING THERAPY: CPT | Mod: GP

## 2024-08-01 PROCEDURE — 80048 BASIC METABOLIC PNL TOTAL CA: CPT

## 2024-08-01 PROCEDURE — 99239 HOSP IP/OBS DSCHRG MGMT >30: CPT | Performed by: HOSPITALIST

## 2024-08-01 PROCEDURE — 25000000 HC PHARMACY GENERAL

## 2024-08-01 PROCEDURE — 63600000 HC DRUGS/DETAIL CODE

## 2024-08-01 PROCEDURE — 36415 COLL VENOUS BLD VENIPUNCTURE: CPT

## 2024-08-01 PROCEDURE — 97162 PT EVAL MOD COMPLEX 30 MIN: CPT | Mod: GP

## 2024-08-01 PROCEDURE — 83735 ASSAY OF MAGNESIUM: CPT

## 2024-08-01 PROCEDURE — 85027 COMPLETE CBC AUTOMATED: CPT

## 2024-08-01 PROCEDURE — 83615 LACTATE (LD) (LDH) ENZYME: CPT | Performed by: HOSPITALIST

## 2024-08-01 RX ORDER — TAMSULOSIN HYDROCHLORIDE 0.4 MG/1
0.4 CAPSULE ORAL DAILY
Qty: 30 CAPSULE | Refills: 0 | Status: SHIPPED | OUTPATIENT
Start: 2024-08-02 | End: 2025-02-04 | Stop reason: SDUPTHER

## 2024-08-01 RX ORDER — CEFUROXIME AXETIL 250 MG/1
250 TABLET ORAL 2 TIMES DAILY
Qty: 4 TABLET | Refills: 0 | Status: SHIPPED | OUTPATIENT
Start: 2024-08-01 | End: 2024-08-03

## 2024-08-01 RX ORDER — DOXYCYCLINE 100 MG/1
100 CAPSULE ORAL 2 TIMES DAILY
Qty: 4 CAPSULE | Refills: 0 | Status: SHIPPED | OUTPATIENT
Start: 2024-08-01 | End: 2024-08-03

## 2024-08-01 RX ORDER — FINASTERIDE 5 MG/1
5 TABLET, FILM COATED ORAL DAILY
Qty: 30 TABLET | Refills: 0 | Status: SHIPPED | OUTPATIENT
Start: 2024-08-02 | End: 2025-02-04 | Stop reason: SDUPTHER

## 2024-08-01 RX ADMIN — DOXYCYCLINE 100 MG: 100 INJECTION, POWDER, LYOPHILIZED, FOR SOLUTION INTRAVENOUS at 04:33

## 2024-08-01 RX ADMIN — ESCITALOPRAM OXALATE 5 MG: 5 TABLET, FILM COATED ORAL at 09:14

## 2024-08-01 RX ADMIN — TAMSULOSIN HYDROCHLORIDE 0.4 MG: 0.4 CAPSULE ORAL at 09:14

## 2024-08-01 RX ADMIN — ANTACID TABLETS 200 MG OF ELEMENTAL CALCIUM: 500 TABLET, CHEWABLE ORAL at 14:49

## 2024-08-01 RX ADMIN — ANTACID TABLETS 200 MG OF ELEMENTAL CALCIUM: 500 TABLET, CHEWABLE ORAL at 09:14

## 2024-08-01 RX ADMIN — HEPARIN SODIUM 5000 UNITS: 5000 INJECTION, SOLUTION INTRAVENOUS; SUBCUTANEOUS at 05:08

## 2024-08-01 RX ADMIN — GENTAMICIN SULFATE 1 DROP: 3 SOLUTION OPHTHALMIC at 14:49

## 2024-08-01 RX ADMIN — FAMOTIDINE 10 MG: 20 TABLET, FILM COATED ORAL at 09:14

## 2024-08-01 RX ADMIN — LEVOTHYROXINE SODIUM 100 MCG: 0.1 TABLET ORAL at 05:32

## 2024-08-01 RX ADMIN — Medication 500 MCG: at 09:14

## 2024-08-01 RX ADMIN — GENTAMICIN SULFATE 1 DROP: 3 SOLUTION OPHTHALMIC at 05:08

## 2024-08-01 RX ADMIN — FINASTERIDE 5 MG: 5 TABLET, FILM COATED ORAL at 09:14

## 2024-08-01 RX ADMIN — CLOPIDOGREL 75 MG: 75 TABLET ORAL at 09:14

## 2024-08-01 RX ADMIN — PANTOPRAZOLE SODIUM 20 MG: 20 TABLET, DELAYED RELEASE ORAL at 09:15

## 2024-08-01 RX ADMIN — THIAMINE HCL TAB 100 MG 50 MG: 100 TAB at 09:14

## 2024-08-01 RX ADMIN — METHENAMINE HIPPURATE 1 G: 1 TABLET ORAL at 11:31

## 2024-08-01 ASSESSMENT — COGNITIVE AND FUNCTIONAL STATUS - GENERAL
STANDING UP FROM CHAIR USING ARMS: 3 - A LITTLE
MOVING TO AND FROM BED TO CHAIR: 3 - A LITTLE
CLIMB 3 TO 5 STEPS WITH RAILING: 2 - A LOT
CLIMB 3 TO 5 STEPS WITH RAILING: 1 - TOTAL
STANDING UP FROM CHAIR USING ARMS: 2 - A LOT
WALKING IN HOSPITAL ROOM: 2 - A LOT
MOVING TO AND FROM BED TO CHAIR: 3 - A LITTLE
AFFECT: WFL;CONFUSED
WALKING IN HOSPITAL ROOM: 3 - A LITTLE

## 2024-08-01 NOTE — DISCHARGE INSTRUCTIONS
Dear Mr. Elena,    You came to the ED because you were having trouble breathing and your family was worried you were confused and not behaving like yourself for a couple of weeks.  They were worried he may have another UTI which has led to similar symptoms.    We did a full workup here and found that you did not have a UTI at least at the time of admission.  However we treated you for a possible UTI and a possible pneumonia regardless.  Your symptoms improved dramatically overnight.    There was a concern that you are aspirating which means you are swallowing your oral secretions into the lungs instead of the stomach which can be dangerous and can contribute to developing pneumonias.  We discussed this with your sons as well as yourself, and the decision was to continue eating as per usual, being mindful of the risks given evidence of concern of aspiration.  You are at high risk of developing pneumonias down the line and this was discussed with you.    In addition, we started you on 2 medications called tamsulosin and Proscar to help with urinary retention as you were found to have an enlarged prostate on the CAT scan.  Please follow-up with urology outpatient for continued management.    You also underwent a thoracentesis to drain fluid around your lung after which you felt better.  We will send you a referral back to the doctor who did your outpatient thoracentesis, Dr. Espinoza.  We have also sent you a referral to a lung doctor (pulmonologist) for continued management of this fluid around your lung.  Please follow-up with the lung doctors if you continue to have concerns about your breathing and fluid around the lung    Please bring this discharge paperwork to all of your follow up appointments. If you experience recurrence of your symptoms, please do not hesitate to call your primary care doctor or present to the emergency department for evaluation. It was a pleasure taking care of you at [facility]  [place] We wish you the best of health.     TO DO:  [ ] Please follow up with your PCP Dr. Hernandez within 1 week.  [ ] Continue taking antibiotics until Saturday.  You can take 1 pill of Ceftin (cefuroxime) with your first dose starting tonight. Take 1 pill with breakfast and dinner on Friday.  Your last dose will be in the morning with breakfast on Saturday.   [ ] Please follow up with cardiologist Dr. Jeffrey on 8/7/2024.   [ ] Stop taking torsemide unless your weight goes up significantly, for example 5 pounds in a week or 3 pounds in 1 day and you feel short of breath. Dr. Jeffrey will manage this moving forward so make sure you follow-up next week.  [ ] Stop taking your potassium and magnesium supplements until you follow-up with your cardiologist next week.  You may not require these if you are not taking the water pill (torsemide).  [ ] Follow up with Dr. Wade (Urologist) within 2 weeks for continued care of your enlarged prostate.  [ ] We are referring you to a lung doctor (pulmonologist).  Follow-up with him if you continue to have concerns about your breathing.      Thank you for allowing us to participate in your care!

## 2024-08-01 NOTE — NURSING NOTE
Pt d/c to home with son. IV and tele removed. All belongings accounted for. Discharge paperwork provided and all questions answered.

## 2024-08-01 NOTE — PLAN OF CARE
Problem: Adult Inpatient Plan of Care  Goal: Plan of Care Review  Outcome: Progressing  Flowsheets (Taken 8/1/2024 1000)  Progress: improving  Outcome Evaluation: PT IE completed. Anticipate discharge home with 24/7 caregiver assistance and PT services.  Plan of Care Reviewed With: patient     Problem: Mobility Impairment  Goal: Optimal Mobility  Outcome: Progressing

## 2024-08-01 NOTE — PROGRESS NOTES
Called patient's son Marko Elena to clarify discharge instructions.  Explained the change in medications during this admission. Clarified the antiobiotic regimen that he has to complete upon discharge. Also, talked about the physicians he has to follow-up on discharge.    Alex Morris,   PGY-1 Internal Medicine  #[id number]

## 2024-08-01 NOTE — PROGRESS NOTES
Physical Therapy -  Initial Evaluation     Patient: Samy Elena  Location: [place] [address]  MRN: [id number]  Today's date: 8/1/2024    HISTORY OF PRESENT ILLNESS     Samy is a 89 y.o. male admitted on 7/28/2024 with Lower urinary tract infection [N39.0]  Pleural effusion on left [J90]  Acute encephalopathy [G93.40]  Acute on chronic congestive heart failure, unspecified heart failure type (CMS/HCC) [I50.9]. Principal problem is Encephalopathy.    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    History of Present Illness  89-year-old male with history of HFrEF, HTN, prior diverticulitis status post colostomy, recurrent pleural effusions requiring multiple thoracentesis tubes CAD status post CABG who presented this admission with shortness of breath, confusion, and hallucinations over the last 2 weeks.      7/31 IR for thoracentesis   PRIOR LEVEL OF FUNCTION AND LIVING ENVIRONMENT     Prior Level of Function      Flowsheet Row Most Recent Value   Dominant Hand right   Ambulation assistive equipment and person   Transferring assistive equipment and person   Toileting assistive person   Bathing assistive equipment and person   Dressing assistive person   Eating independent   IADLs assistive person   Driving/Transportation friends/family   Communication understands/communicates without difficulty   Swallowing difficulty swallowing liquids/foods   Baseline Diet/Method of Nutritional Intake thin liquids, regular  [however known to aspirate with thins via straw]   Past History of Dysphagia Previous SLP history, last  "seen on 3/28/2024 with recommendations to pursue regular solids/thin liquid diet.  Patient with x 3 VFSS completed previously, most recently on 1/5/2023 with aspiration of thin liquids via straw observed.  When utilized with a chin tuck, there was a sensory response.  No aspiration was observed with single cup sips of thin liquids, mildly thick liquids and solids.  Patient now readmitted with 2 weeks of altered mental status and shortness of breath as well as recent UTI dx.   Prior Level of Function Comment pt has 24/7 HHA assists c ADLs, IALs, transfers, and amb c RW   Assistive Device Currently Used at Home wheelchair, walker, standard, scale             Prior Living Environment      Flowsheet Row Most Recent Value   People in Home other (see comments)   Current Living Arrangements home   Home Accessibility ramp to enter home   Living Environment Comment 2 SH c ramp to enter, 1st floor set up c hosp bed, tub shower + chair,  24/7 HHA          VITALS AND PAIN     PT Vitals      Date/Time Pulse HR Source SpO2 Pt Activity O2 Therapy BP BP Location BP Method Pt Position Observations [facility]   08/01/24 0828 57 Monitor 98 % At rest None (Room air) 102/60 Left upper arm Automatic Sitting PT    08/01/24 0832 68 Monitor -- -- -- 83/43 Left upper arm Automatic Sitting PT    08/01/24 0836 71 Monitor -- -- -- 86/51 Left upper arm Automatic Sitting PT    08/01/24 0845 61 Monitor 96 % At rest None (Room air) 98/56 Left upper arm Automatic Lying PT           PT Pain      Date/Time Pain Type Acceptable Pain Level Rating: Rest Rating: Activity [facility]   08/01/24 0828 Pain Assessment 0 0 - no pain 0 - no pain              Objective   OBJECTIVE     Start time:  0825  End time:  0850  Session Length: 25 min       General Observations  Patient received upright, in bed. He was no issues or concerns identified by nurse prior to session, agreeable to therapy. Pt awake and agreeable to activity. Pt endorses \"i'm going home " "today\"    Precautions: fall       Limitations/Impairments: safety/cognitive   Services  Do You Speak a Language Other Than English at Home?: no      PT Eval and Treat - 08/01/24 0825          Cognition    Orientation Status oriented x 3     Affect/Mental Status WFL;confused     Follows Commands follows one-step commands;75-90% accuracy;increased processing time needed;verbal cues/prompting required;delayed response/completion     Cognitive Function executive function deficit;memory deficit;attention deficit     Attention Deficit minimal deficit;concentration     Executive Function Deficit minimal deficit;judgment;initiation;abstract thinking;organization/sequencing     Memory Deficit minimal deficit;short-term memory     Safety Deficit minimal deficit;judgment;problem-solving;insight into deficits/self-awareness     Comment, Cognition Pt awake, cooperative, and agreeable to activity. Pt able to make needs known. Benefits from repetition of verbal cues, [place] with hearing aides.        Vision Assessment/Intervention    Vision Assessment corrective lenses full-time        Hearing Assessment    Hearing Status hearing aid, bilateral;hearing impairment, bilaterally        Sensory Assessment    Sensory Assessment sensation intact, upper extremities;sensation intact, lower extremities        Upper Extremity Assessment    UE Assessment ROM and Strength WFL        Lower Extremity Assessment    LE Assessment ROM and Strength WFL     General Observations grossly strength appears WFLs, 3+/5 ; limited bilateral terminal knee extension        Bed Mobility    Bed Mobility Activities right;supine to sit;sit to supine     [place] close supervision     Safety/Cues increased time to complete;minimal;verbal cues;technique     Assistive Device bed rails;head of bed elevated     Comment out of bed towards right side, increased time to complete        Mobility Belt    Mobility Belt Used During Session no - other (see " comments)     Reason Mobility Belt Not Used colostomy        Sit/Stand Transfer    Surface edge of bed;elevated bed     [place] moderate assist (50-74% patient effort)   +retropulsion noted    Safety/Cues increased time to complete;minimal;verbal cues;hand placement;technique     Assistive Device walker, front-wheeled     Transfer Comments sit>stand from bed 2x ; sit<>stand from chair 2x        Surface-to-Surface Transfers    Transfer Location chair to bed     Transfer Technique stand step     [place] minimum assist (75% or more patient effort)     Safety/Cues increased time to complete;minimal;verbal cues;technique     Assistive Device walker, front-wheeled     Transfer Comments chair > bed secondary to low BP        Gait Training    [place], Gait minimum assist (75% or more patient effort)     Safety/Cues increased time to complete;minimal;verbal cues;proper use of assistive device;sequencing;technique     Assistive Device walker, front-wheeled     Distance in Feet 20 feet     Pattern step-to     Deviations/Abnormal Patterns base of support, narrow;step length decreased;stride length decreased;gait speed decreased;festinating/shuffling     Comment Shuffled steps in room with Robbi and with RW. Pt demonstrates narrow base of support with initial posterior lean. Noted drop in BP once out of bed in chair, returned to bed in chair position.        Balance    Static Sitting Balance WFL;sitting, edge of bed;unsupported     Dynamic Sitting Balance mild impairment;sitting, edge of bed     Sit to Stand Dynamic Balance moderate impairment;standing     Static Standing Balance moderate impairment;standing     Dynamic Standing Balance moderate impairment;standing     Balance Interventions occupation based/functional task     Comment, Balance posterior lean in standing, strong retropulsion noted        Impairments/Safety Issues    Impairments Affecting Function balance;strength;endurance/activity tolerance      Functional Endurance Decreased from baseline, BPs soft. Mod fatigue with minimal exertion. Benefits from rest breaks.     Safety Issues Affecting Function judgment;insight into deficits/self-awareness                                    Education Documentation  Joint Mobility/Strength, taught by Giana Shelton PT at 8/1/2024 10:58 AM.  Learner: Patient  Readiness: Acceptance  Method: Explanation  Response: Verbalizes Understanding  Comment: Role of PT, safety awareness, functional transfers, discharge planning, use of RW.    Home Safety, taught by Giana Shelton PT at 8/1/2024 10:58 AM.  Learner: Patient  Readiness: Acceptance  Method: Explanation  Response: Verbalizes Understanding  Comment: Role of PT, safety awareness, functional transfers, discharge planning, use of RW.    Assistive/Adaptive Devices, taught by Giana Shelton PT at 8/1/2024 10:58 AM.  Learner: Patient  Readiness: Acceptance  Method: Explanation  Response: Verbalizes Understanding  Comment: Role of PT, safety awareness, functional transfers, discharge planning, use of RW.        Session Outcome  Patient upright, in bed at end of session, bed alarm on, all needs met, call light in reach, personal items in reach. Nursing notified about change in vital signs, patient's performance, patient's position, and patient's response to therapy/activity.    AM-PAC™ - Mobility (Current Function)     Turning form your back to your side while in flat bed without using bedrails 4 - None   Moving from lying on your back to sitting on the side of a flat bed without using bedrails 3 - A Little   Moving to and from a bed to a chair 3 - A Little   Standing up from a chair using your arms 3 - A Little   To walk in a hospital room 3 - A Little   Climbing 3-5 steps with a railing 1 - Total   AM-PAC™ Mobility Score 17      ASSESSMENT AND PLAN     Progress Summary  Pt is a 89 y/o male currently presenting to skilled PT IE demonstrating impaired balance,  endurance, and overall functional mobility secondary to AMS and SOB which has led to a slight regression from pt's PLOF. Pt able to complete bed mobility with close supervision, functional transfers with modA, and short distance ambulation in room with RW and with Robbi. Soft BPs noted today (refer to vitals), limiting overall mobility progression ; however, pt largely asymptomatic. Attempted out of bed to upright chair with drop in BP, pt returned to bed and RN notified. Per documentation pt has strong family support including 24/7 caregiver. Will recommend discharge home with prior level of assistance and home PT services. Will continue to see pt in this setting in order to decrease caregiver burden as well as to maximize LOF in anticipation of d/c.    Patient/Family Therapy Goals Statement: to go home    PT Plan      Flowsheet Row Most Recent Value   Rehab Potential good, to achieve stated therapy goals at 08/01/2024 0825   Therapy Frequency 4 times/wk at 08/01/2024 0825   Planned Therapy Interventions balance training, bed mobility training, gait training, strengthening, stair training, neuromuscular re-education, transfer training at 08/01/2024 0825            PT Discharge Recommendations      Flowsheet Row Most Recent Value   PT Recommended Discharge Disposition home with assistance, home with home health  [24/7 assist] at 08/01/2024 0825   Anticipated Equipment Needs if Discharged Home (PT) none at 08/01/2024 0825                 PT Goals      Flowsheet Row Most Recent Value   Bed Mobility Goal 1    Activity/Assistive Device bed mobility activities, all at 08/01/2024 0825   [place] supervision required at 08/01/2024 0825   Time Frame by discharge at 08/01/2024 0825   Progress/Outcome goal ongoing at 08/01/2024 0825   Transfer Goal 1    Activity/Assistive Device all transfers at 08/01/2024 0825   [place] supervision required at 08/01/2024 0825   Time Frame by discharge at 08/01/2024 0825    Progress/Outcome goal ongoing at 08/01/2024 0825   Gait Training Goal 1    Activity/Assistive Device gait (walking locomotion), assistive device use, walker, front-wheeled at 08/01/2024 0825   [place] supervision required at 08/01/2024 0825   Distance 75ft at 08/01/2024 0825   Time Frame by discharge at 08/01/2024 0825   Progress/Outcome goal ongoing at 08/01/2024 0825

## 2024-08-01 NOTE — PROGRESS NOTES
[facility] Referral-Met with patient to review [facility] services. Patient agreeable to services. Will follow and complete referral upon discharge.YAHIR 8/1/24.

## 2024-08-01 NOTE — PROGRESS NOTES
Internal Medicine  Daily Progress Note       SUBJECTIVE   This is a 89 y.o. year-old male admitted on 7/28/2024 with Lower urinary tract infection [N39.0]  Pleural effusion on left [J90]  Acute encephalopathy [G93.40]  Acute on chronic congestive heart failure, unspecified heart failure type (CMS/HCC) [I50.9].    Interval History: No acute events overnight. Vomited twice overnight, reports no blood and no nausea. Thinks he might have eaten too quickly during his last meal. Feels good this morning and was able to eat breakfast without vomiting when I saw him.      OBJECTIVE   Vital signs in last 24 hours:  Temp:  [36.2 °C (97.2 °F)-36.6 °C (97.9 °F)] 36.2 °C (97.2 °F)  Heart Rate:  [54-79] 66  Resp:  [18-20] 18  BP: ()/(50-65) 101/59  SpO2:  [94 %-100 %] 98 %  Oxygen Therapy: None (Room air)    Weight & I/Os:  Weights (last 5 days)       Date/Time Weight    08/01/24 0507 68.5 kg (151 lb 0.2 oz)    07/31/24 0523 68.4 kg (150 lb 12.7 oz)    07/30/24 0554 68.4 kg (150 lb 12.7 oz)    07/29/24 0514 70.5 kg (155 lb 8 oz)    07/29/24 0000 69.2 kg (152 lb 9.6 oz)    07/28/24 1223 70.3 kg (155 lb)            Intake/Output Summary (Last 24 hours) at 8/1/2024 0659  Last data filed at 8/1/2024 0445  24 Hour Net Input/Output from 7AM Yesterday   Intake 120 ml   Output 800 ml   Net -680 ml        PHYSICAL EXAMINATION   Physical Exam  Constitutional:       Appearance: Normal appearance.   Eyes:      Conjunctiva/sclera: Conjunctivae normal.   Cardiovascular:      Rate and Rhythm: Normal rate and regular rhythm.      Pulses: Normal pulses.      Heart sounds: Normal heart sounds.   Pulmonary:      Effort: Pulmonary effort is normal.      Breath sounds: Normal breath sounds.   Abdominal:      General: Bowel sounds are normal.      Palpations: Abdomen is soft.   Skin:     General: Skin is warm and dry.   Neurological:      General: No focal deficit present.      Mental Status: He is alert and oriented to person, place, and  time.   Psychiatric:         Mood and Affect: Mood normal.         Behavior: Behavior normal.          LINES, CATHETERS, DRAINS, AIRWAYS, & WOUNDS   Lines, drains, airways, & wounds:  Peripheral IV (Adult) Anterior;Distal;Left Forearm (Active)   Number of days:        Colostomy Other (comment) LUQ (Active)   Number of days: 729       External Urinary Catheter Medium (Active)   Number of days: 4        LABS, IMAGING, & TELE   Labs:  I have reviewed the patient's labs to the time of note. No new clinical concern.    Results from last 7 days   Lab Units 08/01/24 0449 07/31/24 0512 07/30/24  0343   WBC K/uL 4.80 4.90 4.84   HEMOGLOBIN g/dL 10.5* 10.7* 11.0*   HEMATOCRIT % 32.6* 33.8* 34.6*   PLATELETS K/uL 117* 115* 116*       Results from last 7 days   Lab Units 08/01/24 0449 07/31/24 0512 07/30/24 0343 07/29/24  0524 07/28/24  1229   SODIUM mEQ/L 136 138 138   < > 138   POTASSIUM mEQ/L 4.3 4.1 4.2   < > 5.2*   CHLORIDE mEQ/L 106 106 104   < > 106   CO2 mEQ/L 20* 22 25   < > 22   BUN mg/dL 29* 32* 32*   < > 29*   CREATININE mg/dL 1.9* 2.1* 2.4*   < > 2.5*   CALCIUM mg/dL 8.4* 8.5* 8.6   < > 9.4   ALBUMIN g/dL  --   --   --   --  3.7   BILIRUBIN TOTAL mg/dL  --   --   --   --  1.2   ALK PHOS IU/L  --   --   --   --  89   ALT IU/L  --   --   --   --  7   AST IU/L  --   --   --   --  42*   GLUCOSE mg/dL 82 79 100*   < > 116*    < > = values in this interval not displayed.     Imaging personally reviewed (does not include unread studies):  IR THORACENTESIS    Result Date: 7/31/2024  CLINICAL HISTORY: Shortness of breath. COMMENT: The patient was referred for ultrasound-guided thoracentesis on the left side.  Informed consent was obtained.  With the patient sitting, ultrasound imaging was performed and a large size pleural effusion was identified.  The fluid was localized and a site was selected on the skin with ultrasound.  The site was marked.  Using sterile technique, under local anesthesia, a 4-[place] valved Mitali  catheter was inserted into the pleural space. 700 mL of yellow fluid were removed.  The catheter was removed and a sterile dressing was placed over the catheter insertion site. A large pleural effusion remained post procedure. This procedure was discontinued prior to complete evacuation of the effusion because of patient discomfort.  The fluid was sent to the lab as requested.  The patient tolerated the procedure well. An erect frontal chest film was obtained immediately following the procedure and will be separately reported. This service rendered by Betsy Benitez PA-C     IMPRESSION: Successful ultrasound-guided left thoracentesis with 700 mL removed and sent to the lab.  All components of the time-out, debriefing, and handling of the specimen were conducted as per the ASAP Specimen Protocol. I certify that I have personally reviewed this examination and agree with Betsy Benitez's report. Goyo Burnham M.D.    X-RAY CHEST 1 VIEW    Result Date: 7/31/2024  CLINICAL HISTORY: S/P Left Thoracentesis COMPARISON: 7/28/2024 COMMENT: TECHNIQUE: Single frontal view of the chest was performed. There is interval decrease in size of the moderate to large left pleural effusion. There is no definite pneumothorax identified. Patient is status post median sternotomy. There are multiple postsurgical clips again noted. Right lung is grossly clear.     IMPRESSION: Interval decrease in size of moderate to large left pleural effusion without definite pneumothorax.         ASSESSMENT & PLAN   History of recurrent UTIs  Assessment & Plan  History of frequent UTIs as reported by son  No reported history of renal calculi or BPH however  UA with mild pyuria 35-50 WBC, no bacteria.  No ketones, protein, blood +1 LE.  CT from 08/2023 showed Multiple small nonobstructing right renal calculi  CT from 10/2022 showed prostatic megaly  Presenting today with AMS similar to prior episodes of UTIs    Concern for recurrent UTI unresolved with OP  treatment with Bactrim and Cipro.  UA may be partially sterilized now after outpatient ABX.    - Concern for resistant pathogens  - Continue ceftriaxone 2g Q24 per ID, transition to cefuroxime 250mg BID PO to complete 5 day (7/29-8/2) abx course if discharged prior to 5 days ceftriaxone being completed - per ID recs  - Ucx 30-39k CFU, but recommend recollection d/t possible contaminants  - Follow-up CT AP to rule out anatomic defects/renal calculi/worsening prostatic megaly with undiagnosed BPH contributing to recurrent UTIs  - Continue finasteride 5mg QD per urology recs  - Should follow-up with urology outpatient with Dr. Wade for BPH treatments    * Encephalopathy  Assessment & Plan  2 weeks of AMS with delusions/hallucinations similar to prior UTI episodes  BUN 29, not significantly elevated.  No leukocytosis, no other electrolyte derangements.  VBG 7.38/40  Recent UTI likely inadequately treated  CT head negative  No toxin exposure    Likely toxic metabolic encephalopathy in the setting of ongoing/undertreated UTI plus dehydration 2/2 poor p.o. intake    - S/p 1 dose ceftaz, narrow as able  - Follow-up B12, folate, TSH for completeness particularly given history of hypothyroidism  - F/u ID consult  - Multi-component delirium prevention (minimize deliriogenic medications, promote OOB, avoid restraints/lines/drains, avoid sensory deprivation, promote regular sleep-wake cycle, frequent reorientation)  -C/f Aspiration, SLP video study shows profound pharyngeal phase dysphagia. Conversation with patient, family, and palliative care was had, they prefer comfort feeding.  - OT recs home with assistance    CALI (acute kidney injury) (CMS/[facility])  Assessment & Plan  P/w Cr 2.5 from baseline 1.4, BUN 29  UA with mild pyuria, +1LE  BUN/CR ratio 11    Likely intrinsic renal injury with contribution from hypovolemia/brewing infection    -F/u bladder scan if retaining on bladder scan, straight cath to obtain a urine  sample  -Follow-up urine lites including urine urea given recent diuretic use  -Follow-up CT CAP to evaluate for hydro/renal calculi  - Cr 1.9 today, improving    Shortness of breath  Assessment & Plan  P/W 1 day acute SOB at rest  History of recurrent pleural effusions requiring L sided thoracentesis  VBG without hypercarbia or hypoxia.  No leukocytosis or acute anemia  Euvolemic to dry on exam, saturating appropriately on room air  CXR with worsening left pleural effusion      - S/p 1 dose 60 mg Lasix in the ED, would hold off on further diuretics given concern for  infection  - Lower concern for PNA given lack of focal consolidation on imaging, leukocytosis or PNA symptoms  - Monitor respiratory symptoms and O2 needs closely  - Continue doxycycline 100mg BID for possible atypical pneumonia per ID recs for 5 day course (7/29-8/2)  - S/p thoracentesis 700ml removed    Hypertension  Assessment & Plan  Presenting with BP 110s to 120s/70s  Home meds include amlodipine, metoprolol, torsemide    - Resume as long as BP tolerates    A-fib (CMS/[facility])  Assessment & Plan  History of A-fib maintained on metoprolol and not AC  Reported history of GI bleeds    - Rate controlled in the ED  - Follow-up EKG, unable to locate in the ED  - Continue metoprolol    HFrEF (heart failure with reduced ejection fraction) (CMS/[facility])  Assessment & Plan  Follows with  at [facility]  Most recent echo in 03/2024 with EF 45 to 50%, severe TR  On admission appears hypovolemic/euvolemic, saturating appropriately on room air  , Trop 22.9--> 23.2.  VBG 7.38/40.  CXR with increasing left pleural effusion    Does not appear to be in CHF exacerbation    - S/p 60 mg Lasix in the ED  - Would hold further diuresis given concern for infection and dehydration contributing to altered mental status  - Continue rest of GDMT including metoprolol given no SIRS/sepsis physiology.  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

## 2024-08-01 NOTE — PLAN OF CARE
Care Coordination Discharge Plan Summary    Admission Assessment Summary    General Information  Readmission Within the last 30 days: no previous admission in last 30 days  Does patient have a : No  Patient-Specific Goals (include timeframe):      Living Arrangements  Arrived From: home  Current Living Arrangements: home  People in Home: other (see comments)  Home Accessibility: ramp to enter home  Living Arrangement Comments: pt lives in a 2 story home with a ramp to enter, first floor set up, has a private pay 24/7 aide    Social Determinants of Health - Screenings  Housing Stability  In the last 12 months, was there a time when you were not able to pay the mortgage or rent on time?: No  In the last 12 months, was there a time when you did not have a steady place to sleep or slept in a shelter (including now)?: No  Utility Access     Transportation Needs  In the past 12 months, has lack of transportation kept you from medical appointments or from getting medications?: No  In the past 12 months, has lack of transportation kept you from meetings, work, or from getting things needed for daily living?: No    Functional Status Prior to Admission  Assistive Device/Animal Currently Used at Home: wheelchair, walker, standard, scale  Functional Status Comments:    IADL Comments:      Discharge Needs Assessment    Concerns to be Addressed: discharge planning, care coordination/care conferences  Current Discharge Risk: chronically ill  Anticipated Changes Related to Illness: none    Discharge Plan Summary    Patient Choice  Offered/Gave Vendor List: yes  Patient's Choice of Community Agency(s): [facility]  Patient and/or patient guardian/advocate was made aware of their right to choose a provider. A list of eligible providers was presented and reviewed with the patient and/or patient guardian/advocate in written and/or verbal form. The list delineates providers in the patient’s desired geographic area who are  participating in the Medicare program and/or providers contracted with the patient’s primary insurance. The Medicare list and quality ratings were obtained from the Medicare.gov [medicare.gov] website.    Concerns / Comments: Per DPR, discharge planned for today, home with resumption of 24/7 caregivers and [facility] services. [facility] liaison updated. Patient is able to obtain transportation home, son will  today at 2 pm. Both are agreeable to the discharge plan.  CC will continue to follow for transition of care needs until discharge is complete.    Discharge Plan:  Disposition/Destination: Home Health Care - [facility] / Home       Connection to Community  Patient declined offered resources.  Community Resources:      Discharge Transportation:  Is Out of Hospital DNR needed at Discharge: no  Does patient need discharge transport?

## 2024-08-01 NOTE — PLAN OF CARE
Plan of Care Review  Plan of Care Reviewed With: patient  Progress: improving  Outcome Evaluation: Pt is AAOx3. Forgetful. Afib on heart monitor. Room air and lungs are diminished. Call bell within reach.    Problem: Violence Risk or Actual  Goal: Anger and Impulse Control  Outcome: Progressing     Problem: Adult Inpatient Plan of Care  Goal: Plan of Care Review  Outcome: Progressing  Flowsheets (Taken 8/1/2024 0200)  Progress: improving  Outcome Evaluation: Pt is AAOx3. Forgetful. Afib on heart monitor. Room air and lungs are diminished. Call bell within reach.  Plan of Care Reviewed With: patient  Goal: Patient-Specific Goal (Individualized)  Outcome: Progressing  Goal: Absence of Hospital-Acquired Illness or Injury  Outcome: Progressing  Goal: Optimal Comfort and Wellbeing  Outcome: Progressing  Goal: Readiness for Transition of Care  Outcome: Progressing     Problem: Infection  Goal: Absence of Infection Signs and Symptoms  Outcome: Progressing     Problem: Skin Injury Risk Increased  Goal: Skin Health and Integrity  Outcome: Progressing     Problem: Fall Injury Risk  Goal: Absence of Fall and Fall-Related Injury  Outcome: Progressing     Problem: Swallowing Impairment  Goal: Optimal Eating and Swallowing Without Aspiration  Outcome: Progressing     Problem: Self-Care Deficit  Goal: Improved Ability to Complete Activities of Daily Living  Outcome: Progressing

## 2024-08-01 NOTE — DISCHARGE SUMMARY
Internal Medicine  Inpatient Discharge Summary        BRIEF OVERVIEW   Admitting Provider: Crystal Quarles MD  Attending Provider: Rhoda Herrera MD Attending phys phone: [phone]    PCP: Zachery Hernandez [phone]    Admission Date: 7/28/2024  Discharge Date: 8/1/2024     DISCHARGE DIAGNOSES      Primary Discharge Diagnosis  Encephalopathy    Secondary Discharge Diagnoses  Active Hospital Problems    Diagnosis Date Noted    Encephalopathy 07/28/2024     Priority: High    HFrEF (heart failure with reduced ejection fraction) (CMS/[facility]) 07/28/2024     Priority: Medium    Shortness of breath 07/28/2024     Priority: Medium    CALI (acute kidney injury) (CMS/HCC) 07/28/2024     Priority: Medium    History of recurrent UTIs 07/28/2024     Priority: Low    A-fib (CMS/HCC) 07/28/2024    Hypertension 07/28/2024    Palliative care by specialist 08/10/2022    Debility 08/10/2022      Resolved Hospital Problems   No resolved problems to display.       Active Problem List on Day of Discharge  Patient Active Problem List   Diagnosis    History of transient ischemic attack (TIA)    Hypertension    Ischemic congestive cardiomyopathy (CMS/[facility])    Hyperglycemia    Ataxia    Palliative care by specialist    Debility    Parastomal hernia    Coronary artery disease    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[facility])    Hypertension    Atrial fibrillation (CMS/[facility])    Elevated troponin    Orthostatic hypotension    Hyperlipidemia    Hypothyroidism    Depression    CKD (chronic kidney disease) stage 4, GFR 15-29 ml/min (CMS/[facility])    Pressure ulcer    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)    Multiple medical problems    History of recurrent UTIs    BMI 21.0-21.9, adult    GERD (gastroesophageal reflux disease)    Preop examination    At risk for obstructive sleep apnea    Anemia due to chronic kidney disease    History of chronic CHF    Thrombocytopenia (CMS/[facility])    Glaucoma    Age-related cataract of right eye     Encephalopathy    HFrEF (heart failure with reduced ejection fraction) (CMS/[facility])    History of recurrent UTIs    Shortness of breath    A-fib (CMS/[facility])    Hypertension    CALI (acute kidney injury) (CMS/HCC)     SUMMARY OF HOSPITALIZATION      Presenting Problem/History of Present Illness  89yo w/ HFrEF, TR, diverticulitis s/p colostomy, chronic pleural effusion, AF not on AC, history of Justin's gangrene, CAD s/p CABG presents with 2 weeks of altered mental status 1 day of shortness of breath. He has a history of altered mental status with UTI and was treated as an outpatient with Cipro and Bactrim based on positive urine dip strips without culture sent without improvement in his symptoms. On presentation noted to have CALI.     In the ED, his vitals were: /60, pulse 68, RR 16. Labs were WBC 5.68, hgb 11, K 5.2, Cr 2.5, BUN 29.  CT Head was negative  CXR showed increasing left pleural effusion  He was given 1x Ceftaz and 1x 60mg lasix.    Hospital Course  #Metabolic encephalopathy  #Aspiration pneumonitis/PNA  UA showed mild pyuria, 35-50 WBC, no bacteria, and urine culture showed 30-39k cfus. Unclear if caused by UTI, but encephalopathy likely multifactorial possibly under-treated UTI vs potential PNA as well as CALI. CT was obtained and did not show any obvious etiologies. History of similar encephalopathic episodes with UTIs, and symptoms have been improving since admission and with use of antbiotics.    -Please follow up with outpatient PCP    #Possible UTI  Ceftaz was started in the ED and was transitioned to cefepime per ID recs. He was treated as outpatient with cipro and bactrim so urine may be partially sterile. Urology was consulted and they started him on finasteride and tamsulosin.    -Please follow up with outpatient urology    #Dyspnea  Symptoms improved over the course of his stay. CT showing moderate to large L effusion which was been noted on prior imaging with prior exudative taps without  clear etiology. Discussion about diagnostic/therapeutic thoracentesis is being done with family, and we are covering for potential pneumonia with cefepime.    -Finish Ceftin course for 4 more doses.  -Follow-up with outpatient pulmonology for recurrent effusions.    #Dysphagia  Failed a VFSS with speech language pathology. Palliative was consulted, and along with family, they elected to allow patient to eat for comfort.    #CALI  Creatinine baseline about 1.4-1.7, with it being 2.5 on admission (though was 2.1 on 5/31). Likely prerenal in setting of infection and developed ATN, but antibiotics he received (bactrim) could have contributed.    - Please follow up with outpatient PCP    #chronic CHF  EF 45-50% in March--diuretics increased as outpatient with improvement in weight. Appears euvolemic at present.    -Hold Torsemide  -Follow-up with cardiologist in 1 week    Exam on Day of Discharge  Physical Exam  Constitutional:       Comments: Frail appearing   HENT:      Head: Normocephalic and atraumatic.      Mouth/Throat:      Mouth: Mucous membranes are dry.   Cardiovascular:      Rate and Rhythm: Normal rate and regular rhythm.      Heart sounds: No murmur heard.     No friction rub. No gallop.   Pulmonary:      Effort: Pulmonary effort is normal. No respiratory distress.      Breath sounds: Normal breath sounds. No wheezing, rhonchi or rales.      Comments: Faint crackles, prominently in R lung base  Abdominal:      General: Abdomen is flat. There is no distension.      Palpations: Abdomen is soft.      Tenderness: There is no abdominal tenderness. There is no guarding or rebound.      Comments: Soft brown stool in ostomy bag   Musculoskeletal:      Right lower leg: No edema.      Left lower leg: No edema.   Neurological:      Mental Status: He is alert and oriented to person, place, and time.   Psychiatric:         Mood and Affect: Mood normal.         Behavior: Behavior normal.         Thought Content: Thought  content normal.         Judgment: Judgment normal.         Consults During Admission  IP CONSULT TO INFECTIOUS DISEASE  IP CONSULT TO CASE MANAGEMENT  IP CONSULT TO NUTRITION SERVICES  IP CONSULT TO INFECTIOUS DISEASE  IP CONSULT TO PAIN/PALLIATIVE CARE  IP CONSULT TO UROLOGY    DISCHARGE MEDICATIONS        New, changed, or stopped medications from this admission:       Medication List        ASK your doctor about these medications      calcium (as carbonate) 200 mg calcium (500 mg) chewable tablet  Commonly known as: TUMS  Take 1 tablet by mouth 3 (three) times a day.  Dose: 1 tablet     clopidogreL 75 mg tablet  Commonly known as: PLAVIX  Take 75 mg by mouth every morning.  Dose: 75 mg     cyanocobalamin 500 mcg tablet  Commonly known as: VITAMIN B12  Take 500 mcg by mouth every morning.  Dose: 500 mcg     escitalopram 5 mg tablet  Commonly known as: LEXAPRO  Take 5 mg by mouth every morning.  Dose: 5 mg     famotidine 10 mg tablet  Commonly known as: PEPCID  Take 1 tablet (10 mg total) by mouth daily Indications: gastroesophageal reflux disease.  Dose: 10 mg     levothyroxine 100 mcg tablet  Commonly known as: SYNTHROID  Take 100 mcg by mouth daily.  Dose: 100 mcg  Ask about: Which instructions should I use?     lidocaine 4 % adhesive patch,medicated topical patch  Commonly known as: ASPERCREME  Apply 1 patch topically daily as needed. Remove & discard patch within 12 hours or as directed by prescriber.  Dose: 1 patch     LOTEMAX 0.5 % ophthalmic suspension  Administer 1 drop into the right eye 4 (four) times a day.  Dose: 1 drop  Generic drug: loteprednol  Ask about: Which instructions should I use?     LUMIGAN 0.01 % ophthalmic drops  Administer 1 drop into the left eye nightly.  Dose: 1 drop  Generic drug: bimatoprost     magnesium oxide 400 mg (241.3 mg magnesium) tablet  Commonly known as: MAG-OX  Take 200 mg by mouth 4 (four) times a day. Difficult to swallow at 400 mg, so son found 200 mg. So taking 4  times a day.  Dose: 200 mg     melatonin 3 mg tablet  Take 3 mg by mouth nightly.  Dose: 3 mg     methenamine 1 gram tablet  Commonly known as: HIPREX  Take 1 g by mouth daily before lunch.  Dose: 1 g     metoprolol succinate XL 25 mg 24 hr tablet  Commonly known as: TOPROL-XL  Take 25 mg by mouth daily with dinner.  Dose: 25 mg  Ask about: Which instructions should I use?     omeprazole 20 mg capsule  Commonly known as: PriLOSEC  Take 20 mg by mouth daily with lunch.  Dose: 20 mg     potassium chloride 10 mEq CR tablet  Commonly known as: KLOR-CON  Take 20 mEq by mouth 2 (two) times a day.  Dose: 20 mEq     PROLENSA 0.07 % drops  Administer 1 drop into the right eye daily.  Dose: 1 drop  Generic drug: bromfenac     thiamine 50 mg tablet  Take 50 mg by mouth daily.  Dose: 50 mg     tobramycin 0.3 % ophthalmic solution  Commonly known as: TOBREX  Administer 1 drop into the right eye 4 (four) times a day.  Dose: 1 drop     torsemide 10 mg tablet  Commonly known as: DEMADEX  Take 20 mg by mouth 2 (two) times a day.  Dose: 20 mg              Non-Medication orders at discharge:   Instructions for after discharge       Call provider for:  difficulty breathing, headache or visual disturbances      Call provider for:  extreme fatigue      Call provider for:  persistent dizziness or light-headedness      Call provider for:  persistent nausea or vomiting      Call provider for:  rash      Call provider for:  redness, tenderness, or signs of infection (pain, swelling, redness, odor or green/yellow discharge around incision site)      Call provider for:  severe uncontrolled pain      Call provider for:  temperature >100.4      Follow-up with Provider:      Follow up with Dr. Wade for your prostate and recurrent UTIs.    Marcello Wade MD   [phone]    [facility]  [address]  [address]  [facility] [address]       Follow-up with department      You can follow up with your pulmonologist if you wish you  discuss fluid collection in your lungs.    Follow-up with primary physician (PCP)      Follow up in 1 week    Follow-up with provider      Follow up in 1 week    Roshan Gaines MD   [phone] [address]  [facility] [address]  [place] [id number]       Other Follow-up      Post-Discharge Activity: Normal activity as tolerated.      Normal activity as tolerated.                      PROCEDURES / LABS / IMAGING        Pertinent Imaging  IR THORACENTESIS    Result Date: 7/31/2024  IMPRESSION: Successful ultrasound-guided left thoracentesis with 700 mL removed and sent to the lab.  All components of the time-out, debriefing, and handling of the specimen were conducted as per the ASAP Specimen Protocol. I certify that I have personally reviewed this examination and agree with Betsy Benitez's report. Goyo Burnham M.D.    X-RAY CHEST 1 VIEW    Result Date: 7/31/2024  IMPRESSION: Interval decrease in size of moderate to large left pleural effusion without definite pneumothorax.     FLUOROSCOPY VIDEO SWALLOW WITH SPEECH    Result Date: 7/29/2024  IMPRESSION: 1. Moderate oral phase. Slow lingual manipulation. Reduced bolus cohesion. Delayed AP transfers. Residuals seen to mostly clear with a subsequent swallow. 2. Profound pharyngeal phase. Delayed swallow initiation, pharyngeal dysmotility, mild to moderate residuals throughout the pharynx, worsened with thicker viscosities. There was SILENT aspiration of thin liquids, mildly thick liquids, moderately thick liquids AND puree solids. Although there was NO response to all aspiration events, the patient did demonstrate a very delayed cough following all events. This in conjunction with compensations trialed were not seen to be effective in ejecting/eliminating aspirated material. 3. Esophageal phase not assessed this date. Avril Nava PA-C was present for this examination. This study was reviewed by Dr. Goyo Burnham. The undersigned radiologist agrees only with the  radiographic findings. Specific swallowing recommendations are solely the purview of the Speech Pathology Division. Arleth Schmitz M.S., CCC-SLP     CT CHEST ABDOMEN PELVIS WITHOUT IV CONTRAST    Result Date: 7/29/2024  IMPRESSION: The lack of intravenous contrast limits interpretation of the solid organs, vessels and certain processes. Moderate to large left pleural effusion with near-complete collapse left lower lobe again seen. Left upper lobe airspace disease with increased consolidation since 8/3/2023, due to either volume loss and/or pneumonia/pneumonitis. Unchanged small right layering pleural effusion. Enlarged prostate gland with findings of urinary bladder outlet obstruction again seen.  Correlate with recently performed urinalysis if there is concern for urinary tract infection. Additional chronic/stable findings as described above.     CT HEAD WITHOUT IV CONTRAST    Result Date: 7/28/2024  IMPRESSION: No acute intracranial abnormalities. COMMENT: Computed tomography of the brain was performed at 3.0 mm intervals without intravenous contrast administration. The current study was compared to that dated August 3, 2023 There is no evidence of mass effect or shift of the midline structures. There is ventricular prominence and cortical sulcal prominence, consistent with involutional changes. There is no evidence of acute intracranial hemorrhage or acute cortical infarction. There are no extra-axial fluid collections. There is patchy periventricular white matter hypoattenuation, consistent with microangiopathic changes. The bony calvarium is intact. Imaged portions of the paranasal sinuses and mastoid air cells are clear and intact. CT DOSE:  One or more dose reduction techniques (e.g. automated exposure control, adjustment of the mA and/or kV according to patient size, use of iterative reconstruction technique) utilized for this examination.     X-RAY CHEST 1 VIEW    Result Date: 7/28/2024  IMPRESSION:  Increasing left pleural effusion. COMMENT: The current portable study the chest was compared to that dated May 31, 2024 Further opacification of the left hemithorax is seen secondary to an increasing left pleural effusion. The lower half of the left hemithorax is now obscured. Additional volume loss is likely present in the right retrocardiac region. No right pleural effusion is seen There is no gross pulmonary vascular congestion or edema.     OUTPATIENT  FOLLOW-UP / REFERRALS / PENDING TESTS      Outpatient Follow-Up Appointments            In 6 months Mat Bryant MD [facility]            Referrals  No orders of the defined types were placed in this encounter.      Test Results Pending at Discharge  Pending Inpatient Labs       Order Current Status    AFB Culture Pleural Fluid, Left In process    AFB culture Pleural Fluid, Left In process    AFB stain Pleural Fluid, Left In process    Cytology, Fluids Pleural Fluid, Left In process    Fungal Culture Pleural Fluid, Left In process    Fungal Stain Pleural Fluid, Left In process    Fungal culture / smear Pleural Fluid, Left In process    Body Fluid Culture/Smear Pleural Fluid, Left Preliminary result            Important Issues to Address in Follow-Up  [ ] Please follow up with your PCP Dr. Hernandez within 1 week.  [ ] Please follow up with cardiologist Dr. Jeffrye on 8/7/2024.   [ ] Stop taking torsemide unless your weight goes up significantly. Dr. Jeffrey will manage this moving forward so make sure you follow-up next week.  [ ] Stop taking your potassium and magnesium supplements until you follow-up with your cardiologist next week.    [ ] Follow up with Dr. Wade (Urologist) within 2 weeks for continued care of your enlarged prostate.  [ ] We have referred the patient to a pulmonologist.  Follow-up with him if you continue to have concerns about your breathing.  DISCHARGE DISPOSITION      Disposition: Home Health Care -  [facility]    Code Status At Discharge: DNR (A.N.D.)    Physician Order for Life-Sustaining Treatment Document Status        No documents found                     ATTENDING DOCUMENTATION  ALSO SEE ATTENDING ATTESTATION SECTION OF NOTE

## 2024-08-04 LAB
GRAM STN SPEC: NORMAL
GRAM STN SPEC: NORMAL
MICROORGANISM SPEC CULT: NORMAL

## 2024-08-28 LAB — FUNGUS SPEC CULT: NORMAL

## 2024-09-11 LAB — MYCOBACTERIUM SPEC CULT: NORMAL

## 2024-09-23 ENCOUNTER — HOSPITAL ENCOUNTER (INPATIENT)
Facility: HOSPITAL | Age: 89
LOS: 2 days | Discharge: HOME HEALTH | DRG: 291 | End: 2024-09-26
Attending: EMERGENCY MEDICINE | Admitting: STUDENT IN AN ORGANIZED HEALTH CARE EDUCATION/TRAINING PROGRAM
Payer: MEDICARE

## 2024-09-23 ENCOUNTER — APPOINTMENT (EMERGENCY)
Dept: RADIOLOGY | Facility: HOSPITAL | Age: 89
DRG: 291 | End: 2024-09-23
Payer: MEDICARE

## 2024-09-23 DIAGNOSIS — J90 PLEURAL EFFUSION: Primary | ICD-10-CM

## 2024-09-23 DIAGNOSIS — R06.02 SHORTNESS OF BREATH: ICD-10-CM

## 2024-09-23 DIAGNOSIS — N18.9 CHRONIC KIDNEY DISEASE, UNSPECIFIED CKD STAGE: ICD-10-CM

## 2024-09-23 DIAGNOSIS — I50.9 ACUTE ON CHRONIC CONGESTIVE HEART FAILURE, UNSPECIFIED HEART FAILURE TYPE (CMS/HCC): ICD-10-CM

## 2024-09-23 DIAGNOSIS — D64.9 ANEMIA, UNSPECIFIED TYPE: ICD-10-CM

## 2024-09-23 LAB
ALBUMIN SERPL-MCNC: 3.3 G/DL (ref 3.5–5.7)
ALP SERPL-CCNC: 85 IU/L (ref 34–125)
ALT SERPL-CCNC: 10 IU/L (ref 7–52)
ANION GAP SERPL CALC-SCNC: 8 MEQ/L (ref 3–15)
AST SERPL-CCNC: 45 IU/L (ref 13–39)
BASE EXCESS BLDV CALC-SCNC: 1.3 MEQ/L
BILIRUB SERPL-MCNC: 1.4 MG/DL (ref 0.3–1.2)
BNP SERPL-MCNC: 750 PG/ML
BUN SERPL-MCNC: 37 MG/DL (ref 7–25)
CALCIUM SERPL-MCNC: 8.9 MG/DL (ref 8.6–10.3)
CHLORIDE SERPL-SCNC: 107 MEQ/L (ref 98–107)
CO2 BLDV-SCNC: 27.1 MEQ/L (ref 22–32)
CO2 SERPL-SCNC: 27 MEQ/L (ref 21–31)
CREAT SERPL-MCNC: 1.9 MG/DL (ref 0.7–1.3)
EGFRCR SERPLBLD CKD-EPI 2021: 32.7 ML/MIN/1.73M*2
FIO2 ON VENT: ABNORMAL %
FLUAV RNA SPEC QL NAA+PROBE: NEGATIVE
FLUBV RNA SPEC QL NAA+PROBE: NEGATIVE
GLUCOSE SERPL-MCNC: 179 MG/DL (ref 70–99)
HCO3 BLDV-SCNC: 24.8 MEQ/L (ref 21–28)
INHALED O2 CONCENTRATION: ABNORMAL %
PCO2 BLDV: 40 MM HG (ref 41–51)
PH BLDV: 7.42 [PH] (ref 7.32–7.42)
PO2 BLDV: 29 MM HG (ref 25–40)
POTASSIUM SERPL-SCNC: 4 MEQ/L (ref 3.5–5.1)
PROT SERPL-MCNC: 8.2 G/DL (ref 6–8.2)
RSV RNA SPEC QL NAA+PROBE: NEGATIVE
SARS-COV-2 RNA RESP QL NAA+PROBE: NEGATIVE
SODIUM SERPL-SCNC: 142 MEQ/L (ref 136–145)
TROPONIN I SERPL HS-MCNC: 25.1 PG/ML

## 2024-09-23 PROCEDURE — 36415 COLL VENOUS BLD VENIPUNCTURE: CPT | Performed by: PHYSICIAN ASSISTANT

## 2024-09-23 PROCEDURE — 84484 ASSAY OF TROPONIN QUANT: CPT | Performed by: PHYSICIAN ASSISTANT

## 2024-09-23 PROCEDURE — 96374 THER/PROPH/DIAG INJ IV PUSH: CPT

## 2024-09-23 PROCEDURE — 93005 ELECTROCARDIOGRAM TRACING: CPT | Performed by: PHYSICIAN ASSISTANT

## 2024-09-23 PROCEDURE — 63600000 HC DRUGS/DETAIL CODE: Mod: JZ | Performed by: PHYSICIAN ASSISTANT

## 2024-09-23 PROCEDURE — 80053 COMPREHEN METABOLIC PANEL: CPT | Performed by: PHYSICIAN ASSISTANT

## 2024-09-23 PROCEDURE — 87637 SARSCOV2&INF A&B&RSV AMP PRB: CPT | Performed by: PHYSICIAN ASSISTANT

## 2024-09-23 PROCEDURE — 83615 LACTATE (LD) (LDH) ENZYME: CPT

## 2024-09-23 PROCEDURE — 99285 EMERGENCY DEPT VISIT HI MDM: CPT | Mod: 25

## 2024-09-23 PROCEDURE — 83880 ASSAY OF NATRIURETIC PEPTIDE: CPT | Performed by: PHYSICIAN ASSISTANT

## 2024-09-23 PROCEDURE — 70450 CT HEAD/BRAIN W/O DYE: CPT

## 2024-09-23 PROCEDURE — 82803 BLOOD GASES ANY COMBINATION: CPT | Performed by: PHYSICIAN ASSISTANT

## 2024-09-23 PROCEDURE — 85025 COMPLETE CBC W/AUTO DIFF WBC: CPT | Performed by: PHYSICIAN ASSISTANT

## 2024-09-23 PROCEDURE — 71045 X-RAY EXAM CHEST 1 VIEW: CPT

## 2024-09-23 RX ORDER — FUROSEMIDE 10 MG/ML
40 INJECTION INTRAMUSCULAR; INTRAVENOUS ONCE
Status: COMPLETED | OUTPATIENT
Start: 2024-09-23 | End: 2024-09-23

## 2024-09-23 RX ADMIN — FUROSEMIDE 40 MG: 10 INJECTION, SOLUTION INTRAMUSCULAR; INTRAVENOUS at 23:56

## 2024-09-23 ASSESSMENT — ENCOUNTER SYMPTOMS
CHILLS: 0
VOMITING: 0
RHINORRHEA: 0
FEVER: 1
NAUSEA: 0
ABDOMINAL PAIN: 0
LIGHT-HEADEDNESS: 0
SINUS PRESSURE: 0
DIAPHORESIS: 0
SHORTNESS OF BREATH: 1
COUGH: 1
AGITATION: 0
TROUBLE SWALLOWING: 0
WEAKNESS: 0
SORE THROAT: 0
DIFFICULTY URINATING: 0
DIZZINESS: 0
ABDOMINAL DISTENTION: 1

## 2024-09-24 ENCOUNTER — APPOINTMENT (INPATIENT)
Dept: CARDIOLOGY | Facility: HOSPITAL | Age: 89
DRG: 291 | End: 2024-09-24
Payer: MEDICARE

## 2024-09-24 ENCOUNTER — APPOINTMENT (INPATIENT)
Dept: RADIOLOGY | Facility: HOSPITAL | Age: 89
DRG: 291 | End: 2024-09-24
Payer: MEDICARE

## 2024-09-24 ENCOUNTER — APPOINTMENT (INPATIENT)
Dept: RADIOLOGY | Facility: HOSPITAL | Age: 89
DRG: 291 | End: 2024-09-24
Attending: PHYSICIAN ASSISTANT
Payer: MEDICARE

## 2024-09-24 PROBLEM — R13.10 DYSPHAGIA: Status: ACTIVE | Noted: 2024-09-24

## 2024-09-24 PROBLEM — I50.9 ACUTE DECOMPENSATED HEART FAILURE (CMS/HCC): Status: ACTIVE | Noted: 2024-09-24

## 2024-09-24 LAB
ANION GAP SERPL CALC-SCNC: 8 MEQ/L (ref 3–15)
ANION GAP SERPL CALC-SCNC: 8 MEQ/L (ref 3–15)
APPEARANCE FLD: ABNORMAL
BACTERIA URNS QL MICRO: ABNORMAL /HPF
BASOPHILS # BLD: 0.04 K/UL (ref 0.01–0.1)
BASOPHILS NFR BLD: 0.8 %
BILIRUB UR QL STRIP.AUTO: NEGATIVE MG/DL
BODY FLD TYPE: ABNORMAL
BSA FOR ECHO PROCEDURE: 1.98 M2
BUN SERPL-MCNC: 34 MG/DL (ref 7–25)
BUN SERPL-MCNC: 35 MG/DL (ref 7–25)
CALCIUM SERPL-MCNC: 8.5 MG/DL (ref 8.6–10.3)
CALCIUM SERPL-MCNC: 8.6 MG/DL (ref 8.6–10.3)
CHLORIDE SERPL-SCNC: 104 MEQ/L (ref 98–107)
CHLORIDE SERPL-SCNC: 107 MEQ/L (ref 98–107)
CLARITY UR REFRACT.AUTO: CLEAR
CO2 SERPL-SCNC: 28 MEQ/L (ref 21–31)
CO2 SERPL-SCNC: 29 MEQ/L (ref 21–31)
COLOR FLD: YELLOW
COLOR UR AUTO: YELLOW
CREAT SERPL-MCNC: 1.7 MG/DL (ref 0.7–1.3)
CREAT SERPL-MCNC: 1.7 MG/DL (ref 0.7–1.3)
DIFFERENTIAL METHOD BLD: ABNORMAL
DOP CALC LVOT STROKE VOLUME: 31.71 CM3
E WAVE DECELERATION TIME: 175 MS
EF (A4C): 47.5 %
EGFRCR SERPLBLD CKD-EPI 2021: 37.4 ML/MIN/1.73M*2
EGFRCR SERPLBLD CKD-EPI 2021: 37.4 ML/MIN/1.73M*2
EOSINOPHIL # BLD: 0.11 K/UL (ref 0.04–0.54)
EOSINOPHIL NFR BLD: 2.2 %
ERYTHROCYTE [DISTWIDTH] IN BLOOD BY AUTOMATED COUNT: 19.2 % (ref 11.6–14.4)
ERYTHROCYTE [DISTWIDTH] IN BLOOD BY AUTOMATED COUNT: 19.3 % (ref 11.6–14.4)
FERRITIN SERPL-MCNC: 38 NG/ML (ref 24–250)
FOLATE SERPL-MCNC: >20 NG/ML
GLUCOSE FLD-MCNC: 125 MG/DL
GLUCOSE SERPL-MCNC: 106 MG/DL (ref 70–99)
GLUCOSE SERPL-MCNC: 124 MG/DL (ref 70–99)
GLUCOSE UR STRIP.AUTO-MCNC: NEGATIVE MG/DL
GRAM STN SPEC: NORMAL
HCT VFR BLD AUTO: 30.5 % (ref 40.1–51)
HCT VFR BLD AUTO: 31.3 % (ref 40.1–51)
HEART RATE: 68 BPM
HGB BLD-MCNC: 9.7 G/DL (ref 13.7–17.5)
HGB BLD-MCNC: 9.9 G/DL (ref 13.7–17.5)
HGB UR QL STRIP.AUTO: NEGATIVE
HYALINE CASTS #/AREA URNS LPF: ABNORMAL /LPF
IMM GRANULOCYTES # BLD AUTO: 0.02 K/UL (ref 0–0.08)
IMM GRANULOCYTES NFR BLD AUTO: 0.4 %
INR PPP: 1.7
IRON SATN MFR SERPL: 13 % (ref 15–45)
IRON SERPL-MCNC: 54 UG/DL (ref 35–150)
KETONES UR STRIP.AUTO-MCNC: NEGATIVE MG/DL
LDH FLD L TO P-CCNC: 99 U/L
LDH SERPL L TO P-CCNC: 186 IU/L (ref 98–271)
LDH SERPL L TO P-CCNC: 194 IU/L (ref 98–271)
LEFT VENTRICLE DIASTOLIC VOLUME INDEX: 27.12 ML/M2
LEFT VENTRICLE DIASTOLIC VOLUME: 53.7 CM3
LEFT VENTRICLE SYSTOLIC VOLUME INDEX: 14.24 ML/M2
LEFT VENTRICLE SYSTOLIC VOLUME: 28.2 CM3
LEUKOCYTE ESTERASE UR QL STRIP.AUTO: 2
LVAD-AP4: 21.8 CM2
LVAS-AP4: 14.7 CM2
LVLD-AP4: 7.32 CM
LVLS-AP4: 6.43 CM
LVOT 2D: 2 CM
LVOT A: 3.14 CM2
LVOT MG: 0 MMHG
LVOT MV: 0.29 M/S
LVOT PEAK VELOCITY: 0.51 M/S
LVOT PG: 1 MMHG
LVOT STROKE VOLUME INDEX: 16.02 ML/M2
LVOT VTI: 10.1 CM
LYMPHOCYTES # BLD: 0.5 K/UL (ref 1.2–3.5)
LYMPHOCYTES NFR BLD: 10.1 %
LYMPHOCYTES NFR FLD MANUAL: 14 %
MAGNESIUM SERPL-MCNC: 2.2 MG/DL (ref 1.8–2.5)
MAGNESIUM SERPL-MCNC: 2.2 MG/DL (ref 1.8–2.5)
MCH RBC QN AUTO: 32.5 PG (ref 28–33.2)
MCH RBC QN AUTO: 32.7 PG (ref 28–33.2)
MCHC RBC AUTO-ENTMCNC: 31.6 G/DL (ref 32.2–36.5)
MCHC RBC AUTO-ENTMCNC: 31.8 G/DL (ref 32.2–36.5)
MCV RBC AUTO: 102.6 FL (ref 83–98)
MCV RBC AUTO: 102.7 FL (ref 83–98)
MONOCYTES # BLD: 0.57 K/UL (ref 0.3–1)
MONOCYTES NFR BLD: 11.5 %
MONOS+MACROS NFR FLD MANUAL: 83 %
MUCOUS THREADS URNS QL MICRO: ABNORMAL /LPF
MV PEAK E VEL: 0.65 M/S
MV STENOSIS PRESSURE HALF TIME: 51 MS
MV VALVE AREA P 1/2 METHOD: 4.31 CM2
NEUTROPHILS # BLD: 3.7 K/UL (ref 1.7–7)
NEUTROPHILS NFR FLD MANUAL: 3 %
NEUTS SEG NFR BLD: 75 %
NITRITE UR QL STRIP.AUTO: NEGATIVE
NRBC BLD-RTO: 0.4 %
OVALOCYTES BLD QL SMEAR: ABNORMAL
PATH FLUID REVIEW: NORMAL
PDW BLD AUTO: 10.9 FL (ref 9.4–12.4)
PDW BLD AUTO: 11.5 FL (ref 9.4–12.4)
PH FLD: 7.5 [PH]
PH UR STRIP.AUTO: 7 [PH]
PLAT MORPH BLD: NORMAL
PLATELET # BLD AUTO: 100 K/UL (ref 150–350)
PLATELET # BLD AUTO: 99 K/UL (ref 150–350)
PLATELET # BLD EST: ABNORMAL 10*3/UL
POIKILOCYTOSIS BLD QL SMEAR: ABNORMAL
POLYCHROMASIA BLD QL SMEAR: ABNORMAL
POTASSIUM SERPL-SCNC: 3.7 MEQ/L (ref 3.5–5.1)
POTASSIUM SERPL-SCNC: 3.8 MEQ/L (ref 3.5–5.1)
PROT FLD-MCNC: 3.6 G/DL
PROT UR QL STRIP.AUTO: ABNORMAL
PROTHROMBIN TIME: 19.5 SEC (ref 12.2–14.5)
PV PEAK S VEL: 0.38 M/S
QRS DURATION: 172
QT INTERVAL: 462
QTC CALCULATION(BAZETT): 545
R AXIS: 70
RBC # BLD AUTO: 2.97 M/UL (ref 4.5–5.8)
RBC # BLD AUTO: 3.05 M/UL (ref 4.5–5.8)
RBC # SNV: ABNORMAL CELLS/CU MM (ref 0–10000)
RBC #/AREA URNS HPF: ABNORMAL /HPF
RIGHT VENTRICULAR LENGTH IN DIASTOLE (APICAL 4-CHAMBER VIEW): 9.28 CM
RV AP4 BASE: 6.86 CM
RV AP4 MID: 6.05 CM
SODIUM SERPL-SCNC: 141 MEQ/L (ref 136–145)
SODIUM SERPL-SCNC: 143 MEQ/L (ref 136–145)
SP GR UR REFRACT.AUTO: 1.01
SPECIMEN SOURCE: ABNORMAL
SQUAMOUS URNS QL MICRO: ABNORMAL /HPF
T WAVE AXIS: -35
TAPSE: 1.91 CM
TIBC SERPL-MCNC: 409 UG/DL (ref 270–460)
TROPONIN I SERPL HS-MCNC: 29.3 PG/ML
TV COMP DIAMETER: 6.8 CM
TV MEAN GRADIENT: 0 MMHG
TV MV D: 0.59 M/S
TV PEAK GRADIENT: 1 MMHG
TV REGURGITANT FRACTION: 16 %
TV VTI: 14.8 CM
UIBC SERPL-MCNC: 355 UG/DL (ref 180–360)
UROBILINOGEN UR STRIP-ACNC: 0.2 EU/DL
VENTRICULAR RATE: 84
VIT B12 SERPL-MCNC: >1500 PG/ML (ref 180–914)
WBC # BLD AUTO: 4.94 K/UL (ref 3.8–10.5)
WBC # BLD AUTO: 5.27 K/UL (ref 3.8–10.5)
WBC # SNV AUTO: 606 CELLS/CU MM (ref 0–200)
WBC #/AREA URNS HPF: ABNORMAL /HPF

## 2024-09-24 PROCEDURE — 93306 TTE W/DOPPLER COMPLETE: CPT | Mod: 26 | Performed by: INTERNAL MEDICINE

## 2024-09-24 PROCEDURE — 87070 CULTURE OTHR SPECIMN AEROBIC: CPT

## 2024-09-24 PROCEDURE — 84157 ASSAY OF PROTEIN OTHER: CPT

## 2024-09-24 PROCEDURE — 200200 PR NO CHARGE: Performed by: HOSPITALIST

## 2024-09-24 PROCEDURE — 63700000 HC SELF-ADMINISTRABLE DRUG

## 2024-09-24 PROCEDURE — 36100330 IR THORACENTESIS

## 2024-09-24 PROCEDURE — 82607 VITAMIN B-12: CPT | Performed by: STUDENT IN AN ORGANIZED HEALTH CARE EDUCATION/TRAINING PROGRAM

## 2024-09-24 PROCEDURE — C1729 CATH, DRAINAGE: HCPCS

## 2024-09-24 PROCEDURE — 81001 URINALYSIS AUTO W/SCOPE: CPT | Performed by: PHYSICIAN ASSISTANT

## 2024-09-24 PROCEDURE — 93010 ELECTROCARDIOGRAM REPORT: CPT | Performed by: INTERNAL MEDICINE

## 2024-09-24 PROCEDURE — 85610 PROTHROMBIN TIME: CPT

## 2024-09-24 PROCEDURE — 25000000 HC PHARMACY GENERAL: Performed by: PHYSICIAN ASSISTANT

## 2024-09-24 PROCEDURE — 99223 1ST HOSP IP/OBS HIGH 75: CPT | Performed by: STUDENT IN AN ORGANIZED HEALTH CARE EDUCATION/TRAINING PROGRAM

## 2024-09-24 PROCEDURE — 82945 GLUCOSE OTHER FLUID: CPT

## 2024-09-24 PROCEDURE — 63600000 HC DRUGS/DETAIL CODE: Mod: JZ

## 2024-09-24 PROCEDURE — 82728 ASSAY OF FERRITIN: CPT

## 2024-09-24 PROCEDURE — 87086 URINE CULTURE/COLONY COUNT: CPT | Performed by: PHYSICIAN ASSISTANT

## 2024-09-24 PROCEDURE — 0W9B3ZZ DRAINAGE OF LEFT PLEURAL CAVITY, PERCUTANEOUS APPROACH: ICD-10-PCS | Performed by: STUDENT IN AN ORGANIZED HEALTH CARE EDUCATION/TRAINING PROGRAM

## 2024-09-24 PROCEDURE — 82746 ASSAY OF FOLIC ACID SERUM: CPT | Performed by: STUDENT IN AN ORGANIZED HEALTH CARE EDUCATION/TRAINING PROGRAM

## 2024-09-24 PROCEDURE — 71045 X-RAY EXAM CHEST 1 VIEW: CPT

## 2024-09-24 PROCEDURE — 83735 ASSAY OF MAGNESIUM: CPT

## 2024-09-24 PROCEDURE — 83615 LACTATE (LD) (LDH) ENZYME: CPT

## 2024-09-24 PROCEDURE — 83986 ASSAY PH BODY FLUID NOS: CPT

## 2024-09-24 PROCEDURE — 80048 BASIC METABOLIC PNL TOTAL CA: CPT

## 2024-09-24 PROCEDURE — 21400000 HC ROOM AND CARE CCU/INTERMEDIATE

## 2024-09-24 PROCEDURE — 93306 TTE W/DOPPLER COMPLETE: CPT

## 2024-09-24 PROCEDURE — 83540 ASSAY OF IRON: CPT

## 2024-09-24 PROCEDURE — 89050 BODY FLUID CELL COUNT: CPT

## 2024-09-24 PROCEDURE — 84484 ASSAY OF TROPONIN QUANT: CPT | Performed by: PHYSICIAN ASSISTANT

## 2024-09-24 PROCEDURE — 36415 COLL VENOUS BLD VENIPUNCTURE: CPT | Performed by: PHYSICIAN ASSISTANT

## 2024-09-24 PROCEDURE — 87205 SMEAR GRAM STAIN: CPT

## 2024-09-24 PROCEDURE — 85027 COMPLETE CBC AUTOMATED: CPT

## 2024-09-24 RX ORDER — ESCITALOPRAM OXALATE 5 MG/1
5 TABLET ORAL EVERY MORNING
Status: DISCONTINUED | OUTPATIENT
Start: 2024-09-24 | End: 2024-09-26 | Stop reason: HOSPADM

## 2024-09-24 RX ORDER — CLOPIDOGREL BISULFATE 75 MG/1
75 TABLET ORAL EVERY MORNING
Status: DISCONTINUED | OUTPATIENT
Start: 2024-09-24 | End: 2024-09-26 | Stop reason: HOSPADM

## 2024-09-24 RX ORDER — POTASSIUM CHLORIDE 750 MG/1
10 CAPSULE, EXTENDED RELEASE ORAL 2 TIMES DAILY
COMMUNITY
End: 2024-10-15

## 2024-09-24 RX ORDER — ACETAMINOPHEN 325 MG/1
650 TABLET ORAL EVERY 6 HOURS PRN
Status: DISCONTINUED | OUTPATIENT
Start: 2024-09-24 | End: 2024-09-26 | Stop reason: HOSPADM

## 2024-09-24 RX ORDER — FUROSEMIDE 10 MG/ML
40 INJECTION INTRAMUSCULAR; INTRAVENOUS DAILY
Status: DISCONTINUED | OUTPATIENT
Start: 2024-09-24 | End: 2024-09-25

## 2024-09-24 RX ORDER — POTASSIUM CHLORIDE 20 MEQ/1
20 TABLET, EXTENDED RELEASE ORAL ONCE
Status: COMPLETED | OUTPATIENT
Start: 2024-09-25 | End: 2024-09-24

## 2024-09-24 RX ORDER — METOPROLOL SUCCINATE 25 MG/1
25 TABLET, EXTENDED RELEASE ORAL
Status: DISCONTINUED | OUTPATIENT
Start: 2024-09-24 | End: 2024-09-25

## 2024-09-24 RX ORDER — TORSEMIDE 10 MG/1
10 TABLET ORAL EVERY MORNING
COMMUNITY

## 2024-09-24 RX ORDER — TAMSULOSIN HYDROCHLORIDE 0.4 MG/1
0.4 CAPSULE ORAL DAILY
Status: DISCONTINUED | OUTPATIENT
Start: 2024-09-24 | End: 2024-09-26 | Stop reason: HOSPADM

## 2024-09-24 RX ORDER — LEVOTHYROXINE SODIUM 100 UG/1
100 TABLET ORAL
Status: DISCONTINUED | OUTPATIENT
Start: 2024-09-24 | End: 2024-09-26 | Stop reason: HOSPADM

## 2024-09-24 RX ORDER — ENOXAPARIN SODIUM 100 MG/ML
30 INJECTION SUBCUTANEOUS
Status: DISCONTINUED | OUTPATIENT
Start: 2024-09-24 | End: 2024-09-26 | Stop reason: HOSPADM

## 2024-09-24 RX ORDER — MAGNESIUM 200 MG
200 TABLET ORAL 4 TIMES DAILY
COMMUNITY
End: 2024-09-26 | Stop reason: HOSPADM

## 2024-09-24 RX ORDER — DEXTROSE 40 %
15-30 GEL (GRAM) ORAL AS NEEDED
Status: DISCONTINUED | OUTPATIENT
Start: 2024-09-24 | End: 2024-09-26 | Stop reason: HOSPADM

## 2024-09-24 RX ORDER — DEXTROSE 50 % IN WATER (D50W) INTRAVENOUS SYRINGE
25 AS NEEDED
Status: DISCONTINUED | OUTPATIENT
Start: 2024-09-24 | End: 2024-09-26 | Stop reason: HOSPADM

## 2024-09-24 RX ORDER — IBUPROFEN 200 MG
16-32 TABLET ORAL AS NEEDED
Status: DISCONTINUED | OUTPATIENT
Start: 2024-09-24 | End: 2024-09-26 | Stop reason: HOSPADM

## 2024-09-24 RX ORDER — LIDOCAINE HYDROCHLORIDE 10 MG/ML
INJECTION, SOLUTION INFILTRATION; PERINEURAL
Status: COMPLETED | OUTPATIENT
Start: 2024-09-24 | End: 2024-09-24

## 2024-09-24 RX ORDER — CARBOXYMETHYLCELLULOSE SODIUM 5 MG/ML
1 SOLUTION/ DROPS OPHTHALMIC
COMMUNITY

## 2024-09-24 RX ADMIN — POTASSIUM CHLORIDE 20 MEQ: 1500 TABLET, EXTENDED RELEASE ORAL at 23:54

## 2024-09-24 RX ADMIN — CLOPIDOGREL 75 MG: 75 TABLET ORAL at 09:13

## 2024-09-24 RX ADMIN — METOPROLOL SUCCINATE 25 MG: 25 TABLET, EXTENDED RELEASE ORAL at 17:44

## 2024-09-24 RX ADMIN — ESCITALOPRAM OXALATE 5 MG: 5 TABLET, FILM COATED ORAL at 09:08

## 2024-09-24 RX ADMIN — ENOXAPARIN SODIUM 30 MG: 30 INJECTION SUBCUTANEOUS at 17:45

## 2024-09-24 RX ADMIN — TAMSULOSIN HYDROCHLORIDE 0.4 MG: 0.4 CAPSULE ORAL at 09:15

## 2024-09-24 RX ADMIN — LIDOCAINE HYDROCHLORIDE 10 ML: 10 INJECTION, SOLUTION INFILTRATION; PERINEURAL at 10:53

## 2024-09-24 RX ADMIN — FUROSEMIDE 40 MG: 10 INJECTION, SOLUTION INTRAMUSCULAR; INTRAVENOUS at 09:01

## 2024-09-24 ASSESSMENT — ENCOUNTER SYMPTOMS
CARDIOVASCULAR NEGATIVE: 1
NAUSEA: 1
ABDOMINAL DISTENTION: 1
COUGH: 1
CONSTITUTIONAL NEGATIVE: 1
NEUROLOGICAL NEGATIVE: 1
SHORTNESS OF BREATH: 1

## 2024-09-24 NOTE — ED PROVIDER NOTES
Emergency Medicine Note  HPI   HISTORY OF PRESENT ILLNESS     Patient is a 89-year-old male with past medical history of A-fib, CHF (EF 45-50%), CAD, GI bleed, hypertension, TIA, MI, who was brought to the ED by EMS with concern for fluid retention.  Aide at bedside reported that he has a leaky valve and often accumulates fluid in his lungs and abdomen.  His abdomen appears more distended.  He does have a colostomy that is noted to have good output.  He does not typically require oxygen at baseline, placed on 3 L/min NC by EMS in the field.  He reported that he seemed more confused lately and is concerned that he may also have a UTI.  She mentioned a fall last Wednesday that was unwitnessed, unsure of any head injury. Pt has noted a productive cough but denied any fever/chills, chest pain, abdominal pain, nausea/vomiting, urinary complaints.          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)     Coronary artery disease     Disease of thyroid gland     Will catheter in place     6/25 not at present    GI (gastrointestinal bleed)     Hypertension     Myocardial infarction (CMS/HCC)     Orthostatic hypotension     TIA (transient ischemic attack)        Past Surgical History   Procedure Laterality Date    cataract extraction right eye with implant and limbal relaxing incision Right 7/18/2024    Performed by Hayder Moses MD at  OR [facility]    Cataract extraction w/ intraocular lens implant Left     Cataract extraction with LRI and anterior vitrectomy left eye Left 11/16/2023    Performed by Hayder Moses MD at  OR [facility]    Cholecystectomy      Colostomy      COLOSTOMY LAPAROSCOPIC N/A 8/3/2022    Performed by Mat Bryant MD at [facility] [place]    Coronary artery bypass graft       INCISION AND DRAINAGE WITH DEBRIDMENT OF BUTTOCK, AND PERINEUM N/A 8/2/2022    Performed by Mat Bryant MD at [facility] OR    Joint replacement Left     Knee    RIGHT HEART CATH N/A 8/4/2022    Performed by Cristal Lacey MD at [facility] CARDIAC CATH/EP    Thyroidectomy      WASHOUT OF PERINEAL WOUND, COLONIC LAVAGE N/A 8/3/2022    Performed by Mat Bryant MD at [facility] OR       No family history on file.    Social History     Tobacco Use    Smoking status: Never    Smokeless tobacco: Never   Vaping Use    Vaping status: Never Used   Substance Use Topics    Alcohol use: Not Currently     Comment: social    Drug use: Never         Review of Systems   REVIEW OF SYSTEMS     Review of Systems   Unable to perform ROS: Mental status change   Constitutional:  Positive for fever. Negative for chills and diaphoresis.   HENT:  Positive for congestion. Negative for ear pain, rhinorrhea, sinus pressure, sore throat and trouble swallowing.    Respiratory:  Positive for cough and shortness of breath.    Cardiovascular:  Negative for chest pain.   Gastrointestinal:  Positive for abdominal distention. Negative for abdominal pain, nausea and vomiting.   Genitourinary:  Negative for difficulty urinating.   Neurological:  Negative for dizziness, weakness and light-headedness.   Psychiatric/Behavioral:  Negative for agitation and behavioral problems.          VITALS     ED Vitals      Date/Time Temp Pulse Resp BP SpO2 [facility]   09/23/24 2210 36.5 °C (97.7 °F) -- -- -- -- RSP   09/23/24 2203 -- 84 18 138/71 92 % RSP          Pulse Ox %: 92 % (on 2 L/min NC) (09/23/24 2211)  Pulse Ox Interpretation: Normal (09/23/24 2211)  Heart Rate: 84 (09/23/24 2211)  Rhythm Strip Interpretation: Atrial Fibrillation (RBBB, similar to prior EKG) (09/23/24 2211)     Physical Exam   PHYSICAL EXAM     Physical Exam  Constitutional:       General: He is not in acute distress.     Appearance: Normal appearance. He is normal weight. He is not ill-appearing or  toxic-appearing.   HENT:      Head: Normocephalic.   Eyes:      Conjunctiva/sclera: Conjunctivae normal.   Cardiovascular:      Rate and Rhythm: Normal rate. Rhythm irregular.      Pulses: Normal pulses.      Heart sounds: Normal heart sounds. No murmur heard.  Pulmonary:      Effort: Pulmonary effort is normal. No respiratory distress.      Breath sounds: Rales present. No wheezing or rhonchi.      Comments: Bibasilar crackles appreciated.  Abdominal:      General: Bowel sounds are normal. There is distension.      Palpations: Abdomen is soft. There is no mass.      Tenderness: There is no abdominal tenderness. There is no guarding or rebound.      Comments: Abdomen soft, but distended. Nontender on exam.    Musculoskeletal:         General: Normal range of motion.      Cervical back: Normal range of motion.   Skin:     General: Skin is warm and dry.   Neurological:      Mental Status: He is alert and oriented to person, place, and time. Mental status is at baseline.   Psychiatric:         Mood and Affect: Mood normal.         Behavior: Behavior normal.           PROCEDURES     Procedures     DATA     Results       None            Imaging Results              X-RAY CHEST 1 VIEW (In process)                     ECG 12 lead          Scoring tools                                  ED Course & MDM   MDM / ED COURSE / CLINICAL IMPRESSION / DISPO     Medical Decision Making  Ddx includes CHF exacerbation, hypoalbuminemia, PNA, covid among other pathology.  Patient is afebrile, noted to be satting at 92% on RA, placed on 2 L/min NC.  Patient does not typically require oxygen at baseline.  CXR concerning for large left pleural effusion with concern for CHF exacerbation.  No leukocytosis on labs. Patient previously required thoracentesis at recent admission in July for this, draining 700 cc of exudative fluid with negative culture.  BNP elevated in the 700s, provided 40 mg IV Lasix in the ED. Discussed case with Dr. Tillman from  [facility] who accepted patient to telemetry for further management, notified admitting resident as well.    Problems Addressed:  Acute on chronic congestive heart failure, unspecified heart failure type (CMS/HCC): acute illness or injury  Anemia, unspecified type: acute illness or injury  Chronic kidney disease, unspecified CKD stage: chronic illness or injury  Pleural effusion: acute illness or injury  Shortness of breath: acute illness or injury    Amount and/or Complexity of Data Reviewed  Labs: ordered. Decision-making details documented in ED Course.     Details: Creat 1.9, at baseline  Radiology: ordered and independent interpretation performed.     Details: Radiology study independently interpreted by myself, reviewed with supervising attending.  ECG/medicine tests: ordered and independent interpretation performed.     Details: EKG independently interpreted by myself, reviewed with supervising attending.    Risk  Prescription drug management.  Decision regarding hospitalization.        ED Course as of 09/23/24 2314   Mon Sep 23, 2024   2314 Creatinine(!): 1.9  At baseline [BF]      ED Course User Index  [BF] Tracie Sal PA C     Clinical Impression      None                 Tracie Sal PA C  09/24/24 0116

## 2024-09-24 NOTE — PROGRESS NOTES
Spoke with patient's son and confirmed with medication list from [facility] and/or patient's own pharmacy to complete the medication reconciliation.     Prior to admission medication list:  Current Outpatient Medications:     bimatoprost (LUMIGAN) 0.01 % ophthalmic drops, Administer 1 drop into the left eye nightly., Disp: , Rfl:     calcium carbonate (TUMS) 200 mg calcium (500 mg) chewable tablet, Take 1 tablet by mouth 2 (two) times a day., Disp: , Rfl:     clopidogreL (PLAVIX) 75 mg tablet, Take 75 mg by mouth every morning., Disp: , Rfl:     cyanocobalamin (VITAMIN B12) 500 mcg tablet, Take 500 mcg by mouth every morning., Disp: , Rfl:     escitalopram (LEXAPRO) 5 mg tablet, Take 5 mg by mouth every morning., Disp: , Rfl:     famotidine (PEPCID) 10 mg tablet, Take 1 tablet (10 mg total) by mouth daily Indications: gastroesophageal reflux disease., Disp: 30 tablet, Rfl: 0    finasteride (PROSCAR) 5 mg tablet, Take 1 tablet (5 mg total) by mouth daily., Disp: 30 tablet, Rfl: 0    levothyroxine (SYNTHROID) 100 mcg tablet, Take 100 mcg by mouth daily., Disp: , Rfl:     magnesium 200 mg tablet, Take 200 mg by mouth 4 (four) times a day., Disp: , Rfl:     melatonin 3 mg tablet, Take 3 mg by mouth nightly., Disp: , Rfl:     methenamine (HIPREX) 1 gram tablet, Take 1 g by mouth daily before lunch., Disp: , Rfl:     metoprolol succinate XL (TOPROL-XL) 25 mg 24 hr tablet, Take 12.5 mg by mouth daily with dinner., Disp: , Rfl:     omeprazole (PriLOSEC) 20 mg capsule, Take 20 mg by mouth daily with lunch., Disp: , Rfl:     potassium chloride (MICRO-K) 10 mEq CR capsule, Take 10 mEq by mouth 2 (two) times a day., Disp: , Rfl:     tamsulosin (FLOMAX) 0.4 mg capsule, Take 1 capsule (0.4 mg total) by mouth daily., Disp: 30 capsule, Rfl: 0    thiamine 50 mg tablet, Take 50 mg by mouth daily., Disp: , Rfl:     tobramycin (TOBREX) 0.3 % ophthalmic solution, Administer 1 drop into the right eye 3 (three) times a day., Disp: , Rfl:      torsemide (DEMADEX) 10 mg tablet, Take 10 mg by mouth daily., Disp: , Rfl:     carboxymethylcellulose (REFRESH PLUS) 0.5 % dropperette, Administer 1 drop into both eyes 4 (four) times a day as needed for dry eyes., Disp: , Rfl:     lidocaine (ASPERCREME) 4 % adhesive patch,medicated topical patch, Apply 1 patch topically daily as needed. Remove & discard patch within 12 hours or as directed by prescriber., Disp: , Rfl:      Comments about home medications:  -Patient's son reports the magnesium, potassium, and torsemide were restarted at lower doses.     Compliance:   -Last filled Lumigan in July 2024 for 30 day supply.  -Last filled tobramycin in November 2024 for 7 day supply.  -Recent fills on remaining medications

## 2024-09-24 NOTE — ASSESSMENT & PLAN NOTE
Failed a VFSS during previous admission with speech language pathology and family elected to allow eating for comfort. Spoke with family and they would like to continue eating or comfort, understand the risks of aspiration    - resume soft and bite sized diet  - aspiration precautions

## 2024-09-24 NOTE — PROGRESS NOTES
Spoke with son to update on plan for thoracentesis. Son is interested in getting set up for outpatient thoracenteses, and apparently has done this in the past. All questions answered.

## 2024-09-24 NOTE — H&P
Internal Medicine  History & Physical        CHIEF COMPLAINT   Back pain      HISTORY OF PRESENT ILLNESS      Patient is a 89-year-old male with past medical history of A-fib, CHF (EF 45-50%), CAD, GI bleed, hypertension, TIA, MI, who was brought to the ED by EMS for concern for fluid overload.     Patient reports that he has been having back pain. Patient reports that he has been having some worsening shortness of breath. He also reports abdominal distention that has worsened in the last few days. Patient also reports that he has been having some episodes of nausea and vomiting in the last few days that he attributes to his abdominal distention. He denies any fevers or chills. He reports a chronic cough.     Patient reports that he is compliant with his medications, however, he reports that he has been having an increased fluid intake recently. He reports that he tries to follow a low salt diet. He takes torsemide 10 mg daily.     To note, patient was admitted multiple times for heart failure exacerbation and volume overload. Most recently patient was admitted from  to  for altered mental status and was found to have pneumonia and UTI.     ED Course:   Vitals:  Temp:  [36.5 °C (97.7 °F)] 36.5 °C (97.7 °F)  Heart Rate:  [75-84] 75  Resp:  [16-18] 16  BP: (125-138)/(66-86) 133/66  SpO2:  [92 %-97 %] 96 %  Oxygen Therapy: Supplemental oxygen  O2 Delivery Method: Nasal cannula    Labs  BMP: Na 142, K 4.0, BUN 37, Cr 1.9, bicarb 27, AG 8  CBC: WBC 4.94, hemoglobin 9.7, hematocrit 30.5, .7, platelets 99  Cardiac: hsTroponin 29.3,   Liver: AST 45, ALT 10, ALP 85, albumin 3.3, Tbili 1.4  VB.42/40   UA: 20 to 35 WBC, +2 leukocyte esterase    Imaging  CT head:   No evidence of hemorrhage, mass or acute territorial infarction by   CT criteria.   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)     Coronary artery disease     Disease of thyroid gland     Will catheter in place     6/25 not at present    GI (gastrointestinal bleed)     Hypertension     Myocardial infarction (CMS/HCC)     Orthostatic hypotension     TIA (transient ischemic attack)        PSHx:  Past Surgical History   Procedure Laterality Date    cataract extraction right eye with implant and limbal relaxing incision Right 7/18/2024    Performed by Hayder Moses MD at  OR [facility]    Cataract extraction w/ intraocular lens implant Left     Cataract extraction with LRI and anterior vitrectomy left eye Left 11/16/2023    Performed by Hayder Moses MD at  OR [facility]    Cholecystectomy      Colostomy      COLOSTOMY LAPAROSCOPIC N/A 8/3/2022    Performed by Mat Bryant MD at [facility] [place]    Coronary artery bypass graft      INCISION AND DRAINAGE WITH DEBRIDMENT OF BUTTOCK, AND PERINEUM N/A 8/2/2022    Performed by Mat Bryant MD at [facility] [place] [place]    Joint replacement Left     Knee    RIGHT HEART CATH N/A 8/4/2022    Performed by Cristal Lacey MD at [facility] [place]/EP    Thyroidectomy      WASHOUT OF PERINEAL WOUND, COLONIC LAVAGE N/A 8/3/2022    Performed by Mat Bryant MD at [facility] [place] [place]       PCP:   Zachery Hernandez MD    MEDICATIONS      Prior to Admission medications    Medication Sig Start Date End Date Taking? Authorizing Provider   bimatoprost (LUMIGAN) 0.01 % ophthalmic drops Administer 1 drop into the left eye nightly.    Provider, Clay Lopez MD   bromfenac (PROLENSA) 0.07 % drops Administer 1 drop into the right eye daily.    Provider, Clay Lopez MD   calcium carbonate (TUMS) 200 mg calcium (500 mg) chewable tablet Take 1 tablet by mouth 3 (three) times a day.    ProviderClay MD   clopidogreL (PLAVIX) 75 mg tablet Take 75 mg  by mouth every morning.    Clay Chambers MD   cyanocobalamin (VITAMIN B12) 500 mcg tablet Take 500 mcg by mouth every morning.    Clay Chambers MD   escitalopram (LEXAPRO) 5 mg tablet Take 5 mg by mouth every morning.    Clay Chambers MD   famotidine (PEPCID) 10 mg tablet Take 1 tablet (10 mg total) by mouth daily Indications: gastroesophageal reflux disease. 12/8/23 3/26/25  Crystal Alonso DO   finasteride (PROSCAR) 5 mg tablet Take 1 tablet (5 mg total) by mouth daily. 8/2/24 9/1/24  Alex Morris DO   levothyroxine (SYNTHROID) 100 mcg tablet Take 100 mcg by mouth daily.    Clay Chambers MD   lidocaine (ASPERCREME) 4 % adhesive patch,medicated topical patch Apply 1 patch topically daily as needed. Remove & discard patch within 12 hours or as directed by prescriber.    Clay Chambers MD   loteprednol (LOTEMAX) 0.5 % ophthalmic suspension Administer 1 drop into the right eye 4 (four) times a day.    Clay Chambers MD   melatonin 3 mg tablet Take 3 mg by mouth nightly.    Clay Chmabers MD   methenamine (HIPREX) 1 gram tablet Take 1 g by mouth daily before lunch.    Clay Chambers MD   metoprolol succinate XL (TOPROL-XL) 25 mg 24 hr tablet Take 25 mg by mouth daily with dinner.    Clay Chambers MD   omeprazole (PriLOSEC) 20 mg capsule Take 20 mg by mouth daily with lunch. 5/25/24   Clay Chambers MD   tamsulosin (FLOMAX) 0.4 mg capsule Take 1 capsule (0.4 mg total) by mouth daily. 8/2/24 9/1/24  Alex Morris DO   thiamine 50 mg tablet Take 50 mg by mouth daily.    Clay Chambers MD   tobramycin (TOBREX) 0.3 % ophthalmic solution Administer 1 drop into the right eye 4 (four) times a day.    Clay Chambers MD   amLODIPine (NORVASC) 2.5 mg tablet amlodipine 2.5 mg tablet  7/22/22  John Chambers MD   apixaban (ELIQUIS) 2.5 mg tablet   7/22/22  Provider, Historical,  MD   hydrochlorothiazide (MICROZIDE) 12.5 mg capsule hydrochlorothiazide 12.5 mg capsule  7/22/22  Provider, John, MD       Home medications were personally reviewed.    ALLERGIES      Jardiance [[email or web address]]    FAMILY HISTORY      No family history on file.    SOCIAL HISTORY      Social History     Socioeconomic History    Marital status:    Tobacco Use    Smoking status: Never    Smokeless tobacco: Never   Vaping Use    Vaping status: Never Used   Substance and Sexual Activity    Alcohol use: Not Currently     Comment: social    Drug use: Never    Sexual activity: Defer     Social Drivers of Health     Financial Resource Strain: Low Risk  (8/4/2023)    Overall Financial Resource Strain (CARDIA)     Difficulty of Paying Living Expenses: Not hard at all   Food Insecurity: No Food Insecurity (9/23/2024)    Hunger Vital Sign     Worried About Running Out of Food in the Last Year: Never true     Ran Out of Food in the Last Year: Never true   Transportation Needs: No Transportation Needs (7/29/2024)    PRAPARE - Transportation     Lack of Transportation (Medical): No     Lack of Transportation (Non-Medical): No   Housing Stability: Unknown (7/29/2024)    Housing Stability Vital Sign     Unable to Pay for Housing in the Last Year: No     Unstable Housing in the Last Year: No       REVIEW OF SYSTEMS      Review of Systems   Constitutional: Negative.    Respiratory:  Positive for cough and shortness of breath.    Cardiovascular: Negative.  Negative for leg swelling.   Gastrointestinal:  Positive for abdominal distention and nausea.   Genitourinary: Negative.    Neurological: Negative.        PHYSICAL EXAMINATION      Temp:  [36.5 °C (97.7 °F)] 36.5 °C (97.7 °F)  Heart Rate:  [75-84] 78  Resp:  [16-18] 16  BP: (124-138)/(66-86) 124/75  Body mass index is 21.18 kg/m².    Physical Exam  Neck:      Comments: JVD   Cardiovascular:      Rate and Rhythm: Normal rate and regular rhythm.      Pulses: Normal pulses.       Heart sounds: Normal heart sounds.   Pulmonary:      Comments: Decreased lung sounds   Abdominal:      General: There is distension.      Palpations: Abdomen is soft.   Musculoskeletal:      Right lower leg: No edema.      Left lower leg: No edema.   Neurological:      Mental Status: He is alert and oriented to person, place, and time.         LABS / IMAGING / EKG        Labs:  Lab Results   Component Value Date    WBC 4.94 09/23/2024    HGB 9.7 (L) 09/23/2024    HCT 30.5 (L) 09/23/2024    .7 (H) 09/23/2024    PLT 99 (L) 09/23/2024     Lab Results   Component Value Date    GLUCOSE 179 (H) 09/23/2024    CALCIUM 8.9 09/23/2024     09/23/2024    K 4.0 09/23/2024    CO2 27 09/23/2024     09/23/2024    BUN 37 (H) 09/23/2024    CREATININE 1.9 (H) 09/23/2024     Microbiology Data personally reviewed:  Microbiology Results       Procedure Component Value Units Date/Time    SARS-COV-2 (COVID-19)/ FLU A/B, AND RSV, PCR Nasopharynx [[id number]]  (Normal) Collected: 09/23/24 2212    Specimen: Nasopharyngeal Swab from Nasopharynx Updated: 09/23/24 2322     SARS-CoV-2 (COVID-19) Negative     Influenza A Negative     Influenza B Negative     Respiratory Syncytial Virus Negative    Narrative:      Testing performed using real-time PCR for detection of COVID-19. EUA approved validation studies performed on site.             Imaging personally reviewed(does not include unread studies):  CT HEAD WITHOUT IV CONTRAST    Result Date: 9/23/2024  IMPRESSION: No evidence of hemorrhage, mass or acute territorial infarction by CT criteria. COMMENT: Technique: Computed tomography of the brain was performed utilizing contiguous 2.5 mm transaxial sections without intravenous contrast administration. CT DOSE:  One or more dose reduction techniques (e.g. automated exposure control, adjustment of the mA and/or kV according to patient size, use of iterative reconstruction technique) utilized for this examination. Comparison  studies: Head CT 7/28/2024. Postsurgical change: None. Brain parenchyma: There is no intraparenchymal hemorrhage, mass effect, midline shift or extra-axial fluid collection. White matter changes: Normal for age Ventricles, cisterns, and sulci: Normal in size and configuration. Sella: Normal in appearance. Cerebellar tonsils: Normal. Calvarium and extra cranial soft tissues: Normal. Paranasal sinuses: Clear bilaterally. Mastoid air cell: Normal. Orbits: Normal.      ECG/Telemetry  I have independently reviewed the telemetry. No events for the last 24 hours.       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   Chest xray showing vascular congestion      Echo showing large coaptation gap of the tricuspid leaflets. Wide-open tricuspid regurgitation with single chamber physiology.   S/p lasix 40 once IV   Currently on 3 L nasal cannula    - continue lasix 40 IV daily   - Consider pleural tap if no improvement   - previously had a pleural tap showing exudative fluid     Anemia due to chronic kidney disease  Anemia with hb 9.7   Fu iron studies  Coronary artery disease  CAD post CABG in 1996     -Continue home plavix      Hypothyroidism  Continue levothyroxine 100   CKD (chronic kidney disease) stage 4, GFR 15-29 ml/min (CMS/[facility])  Appears around baseline   Cr 1.9    - trend BMP with diuresis   A-fib (CMS/[facility])  On metoprolol succinate 25 daily and not on AC   Dysphagia  Failed a VFSS during previous admission with speech language pathology and family elected to allow eating for comfort.     - F/U SLP  - npo for now pending SLP     ATTENDING  DOCUMENTATION  ALSO SEE ATTENDING ATTESTATION SECTION OF NOTE

## 2024-09-24 NOTE — PROGRESS NOTES
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   Chest xray showing vascular congestion and bilateral pleural effusion worse on left side. This effusion is worsened in comparison to previous CXR on 7/31/24     Echo showing large coaptation gap of the tricuspid leaflets. Wide-open tricuspid regurgitation with single chamber physiology. Estimated EF 40-45%. No significant change from previous ECHO on 3/28/24  S/p lasix 40 once IV   Currently on 2 L nasal cannula    -  s/p left pleural thoracentesis 900ml clear yellow fluid by IR today at 1058  - continue lasix 40 IV daily   - f/u pleural fluid analysis    Anemia due to chronic kidney disease  Anemia with hb 9.7, consistent with previous labs  Iron studies show low iron sat, normal iron and ferritin  Coronary artery disease  CAD post CABG in 1996     -Continue home plavix      Hypothyroidism  Continue levothyroxine 100   CKD (chronic kidney disease) stage 4, GFR 15-29 ml/min (CMS/HCC)  Appears around baseline   Cr 1.9    - trend BMP with diuresis   A-fib (CMS/HCC)  On metoprolol succinate 25 daily and not on AC   Dysphagia  Failed a VFSS during previous admission with speech language pathology and family elected to allow eating for comfort. Spoke with family and they would like to continue eating or comfort, understand the risks of aspiration    - resume soft and bite sized diet  - aspiration precautions    SUBJECTIVE   This is a 89 y.o. year-old male admitted on 9/23/2024 with Pleural effusion [J90]  Acute decompensated heart failure (CMS/HCC) [I50.9].    Interval History: Patient was seen and examined this morning. He is AAOx4. He overall feels better than when he came in. Overnight he desaturated to the 80s on RA and was placed on 2L NC which improved his O2 sat. He feels  that his breathing is better and he has not had any nausea or vomiting overnight. Denies CP, SOB, abdominal distension, leg swelling.     OBJECTIVE      Vital signs in last 24 hours:  Temp:  [36.4 °C (97.5 °F)-36.5 °C (97.7 °F)] 36.4 °C (97.5 °F)  Heart Rate:  [75-84] 76  Resp:  [16-22] 22  BP: (108-138)/(60-86) 111/70  Temp (24hrs), Av.4 °C (97.6 °F), Min:36.4 °C (97.5 °F), Max:36.5 °C (97.7 °F)    Intake/Output    None         PHYSICAL EXAMINATION      Physical Exam  Vitals reviewed.   Constitutional:       General: He is not in acute distress.     Appearance: Normal appearance.   HENT:      Head: Normocephalic and atraumatic.   Eyes:      Pupils: Pupils are equal, round, and reactive to light.   Cardiovascular:      Rate and Rhythm: Normal rate. Rhythm irregular.      Pulses: Normal pulses.      Heart sounds: Normal heart sounds. No murmur heard.     No gallop.   Pulmonary:      Effort: Pulmonary effort is normal. No respiratory distress.      Breath sounds: Wheezing and rales present.   Abdominal:      General: Abdomen is flat. There is no distension.      Palpations: Abdomen is soft.      Tenderness: There is no abdominal tenderness.      Comments: Ostomy site CDI   Genitourinary:     Comments: Urinary incontinence present, patient bedsheets and gown soiled  Skin:     General: Skin is warm and dry.      Capillary Refill: Capillary refill takes less than 2 seconds.   Neurological:      General: No focal deficit present.      Mental Status: He is alert and oriented to person, place, and time.   Psychiatric:         Mood and Affect: Mood normal.         Behavior: Behavior normal.            LINES, CATHETERS, DRAINS, AIRWAYS, AND WOUNDS   Lines, Drains, Airways, Wounds: Ostomy bag left lower abdomen       Comments: Ostomy site CDI with no leaks. Wound ostomy consulted for management     LABS / IMAGING / TELE      Labs  I have reviewed the patient's pertinent labs.  Significant abnormals are creatinine 1.7, iron  sat 13, hgb 9.9.    Imaging personally reviewed(does not include unread studies):  CT HEAD WITHOUT IV CONTRAST    Result Date: 9/23/2024  IMPRESSION: No evidence of hemorrhage, mass or acute territorial infarction by CT criteria. COMMENT: Technique: Computed tomography of the brain was performed utilizing contiguous 2.5 mm transaxial sections without intravenous contrast administration. CT DOSE:  One or more dose reduction techniques (e.g. automated exposure control, adjustment of the mA and/or kV according to patient size, use of iterative reconstruction technique) utilized for this examination. Comparison studies: Head CT 7/28/2024. Postsurgical change: None. Brain parenchyma: There is no intraparenchymal hemorrhage, mass effect, midline shift or extra-axial fluid collection. White matter changes: Normal for age Ventricles, cisterns, and sulci: Normal in size and configuration. Sella: Normal in appearance. Cerebellar tonsils: Normal. Calvarium and extra cranial soft tissues: Normal. Paranasal sinuses: Clear bilaterally. Mastoid air cell: Normal. Orbits: Normal.      ECG/Telemetry  I have independently reviewed the telemetry. No events for the last 24 hours.       VTE Assessment: Padua    VTE Prophylaxis: Current anticoagulants:  enoxaparin (LOVENOX) syringe 30 mg, subcutaneous, Daily (6p)      Code Status: DNR (A.N.D.)  Estimated discharge date: 9/26/2024     ATTENDING DOCUMENTATION  ALSO SEE ATTENDING ATTESTATION SECTION OF NOTE

## 2024-09-24 NOTE — OR SURGEON
Pre-Procedure patient identification:  I am the primary operating surgeon/proceduralist and I have reviewed the applicable pathology reports and radiology studies for this procedure. I have identified the patient on 09/24/24 at 10:44 AM SOPHIA Seals C

## 2024-09-24 NOTE — PROGRESS NOTES
Called to beside for confusion. When I arrived, patient returned to baseline. Son at bedside and provided medical update. All questions answered.

## 2024-09-24 NOTE — ASSESSMENT & PLAN NOTE
Anemia with hb 9.7, consistent with previous labs  Iron studies show low iron sat, normal iron and ferritin

## 2024-09-24 NOTE — ASSESSMENT & PLAN NOTE
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   Chest xray showing vascular congestion and bilateral pleural effusion worse on left side. This effusion is worsened in comparison to previous CXR on 7/31/24     Echo showing large coaptation gap of the tricuspid leaflets. Wide-open tricuspid regurgitation with single chamber physiology. Estimated EF 40-45%. No significant change from previous ECHO on 3/28/24  S/p lasix 40 once IV   Currently on 2 L nasal cannula    -  s/p left pleural thoracentesis 900ml clear yellow fluid by IR today at 1058  - continue lasix 40 IV daily   - f/u pleural fluid analysis

## 2024-09-24 NOTE — ASSESSMENT & PLAN NOTE
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   Chest xray showing vascular congestion      Echo showing large coaptation gap of the tricuspid leaflets. Wide-open tricuspid regurgitation with single chamber physiology.   S/p lasix 40 once IV   Currently on 3 L nasal cannula    - continue lasix 40 IV daily   - Consider pleural tap if no improvement   - previously had a pleural tap showing exudative fluid

## 2024-09-24 NOTE — ASSESSMENT & PLAN NOTE
Pt with a hx of HFmrEF (EF 40-45% on TTE 9/24)  Patient presenting with volume overload with abdominal distention, JVD.   Chest xray showing vascular congestion and bilateral pleural effusion worse on left side. This effusion is worsened in comparison to previous CXR on 7/31/24     Echo showing large coaptation gap of the tricuspid leaflets. Wide-open tricuspid regurgitation with single chamber physiology. Estimated EF 40-45%. No significant change from previous ECHO on 3/28/24  BIPAP overnight, weaning from 4 L nasal cannula  s/p left pleural thoracentesis 900ml clear yellow fluid, exudative effusion. F/u CXR showed moderate residual L effusion and no appreciable pneumothorax  Patient clinically improved with resolved SOB and cough    - Lasix discontinued this morning after morning dose in setting of improved clinical presentation and low BP  - weaned to 2LNC in room while satting %. Will wean to RA and plan discharge  - PT/OT

## 2024-09-24 NOTE — PROGRESS NOTES
I spoke to the patient's son Marko Mcbride over the phone to provide a brief medical update and collect additional information about the patient's home diet.  On chart review, appears patient fully failed VFSS during his last hospital admission.  After conversation between family, the patient, palliative, decision was made to proceed with comfort feeds and accept the aspiration risk.  At home, patient eats regular foods, though family takes care to divide these into small, easily chewable bites.  His son is amenable to starting diet with easy to chew solids to minimize aspiration risk, though his understanding that patient remains a high aspiration risk overall.    Barb Escobar MD  Internal Medicine PGY3  [id number]

## 2024-09-24 NOTE — ASSESSMENT & PLAN NOTE
Failed a VFSS during previous admission with speech language pathology and family elected to allow eating for comfort.     - F/U SLP  - npo for now pending SLP

## 2024-09-24 NOTE — POST-PROCEDURE NOTE
Interventional Radiology Brief Postprocedure Note    Samy Elena     Attending: SOPHIA Seals / Elio Espinoza M.D.    Assistant: n/a    Diagnosis: SOB    Description of procedure: US guided left thoracentesis    Contrast: none     Anesthesia:  Local (Lidocaine)    Volume of Lidocaine Utilized (ml): 10ml     Medications: none     Complications: None      Estimated Blood Loss: Estimated Blood Loss: 0-10 ml    Anticoagulation: n/a    Specimens: 900 ml clear yellow fluid    Findings: Successful US guided LEFT thoracentesis with 900 ml removed. VSS Throughout. CXR pending.    9/24/2024 10:58 AM

## 2024-09-25 LAB
ANION GAP SERPL CALC-SCNC: 9 MEQ/L (ref 3–15)
ANION GAP SERPL CALC-SCNC: 9 MEQ/L (ref 3–15)
BACTERIA UR CULT: NORMAL
BUN SERPL-MCNC: 33 MG/DL (ref 7–25)
BUN SERPL-MCNC: 35 MG/DL (ref 7–25)
CALCIUM SERPL-MCNC: 8.4 MG/DL (ref 8.6–10.3)
CALCIUM SERPL-MCNC: 8.4 MG/DL (ref 8.6–10.3)
CHLORIDE SERPL-SCNC: 105 MEQ/L (ref 98–107)
CHLORIDE SERPL-SCNC: 107 MEQ/L (ref 98–107)
CO2 SERPL-SCNC: 27 MEQ/L (ref 21–31)
CO2 SERPL-SCNC: 28 MEQ/L (ref 21–31)
CREAT SERPL-MCNC: 1.6 MG/DL (ref 0.7–1.3)
CREAT SERPL-MCNC: 1.7 MG/DL (ref 0.7–1.3)
EGFRCR SERPLBLD CKD-EPI 2021: 37.4 ML/MIN/1.73M*2
EGFRCR SERPLBLD CKD-EPI 2021: 40.2 ML/MIN/1.73M*2
ERYTHROCYTE [DISTWIDTH] IN BLOOD BY AUTOMATED COUNT: 18.9 % (ref 11.6–14.4)
GLUCOSE SERPL-MCNC: 112 MG/DL (ref 70–99)
GLUCOSE SERPL-MCNC: 92 MG/DL (ref 70–99)
HCT VFR BLD AUTO: 30.7 % (ref 40.1–51)
HGB BLD-MCNC: 9.7 G/DL (ref 13.7–17.5)
MAGNESIUM SERPL-MCNC: 2 MG/DL (ref 1.8–2.5)
MAGNESIUM SERPL-MCNC: 2.1 MG/DL (ref 1.8–2.5)
MCH RBC QN AUTO: 32.6 PG (ref 28–33.2)
MCHC RBC AUTO-ENTMCNC: 31.6 G/DL (ref 32.2–36.5)
MCV RBC AUTO: 103 FL (ref 83–98)
PDW BLD AUTO: 11.4 FL (ref 9.4–12.4)
PLATELET # BLD AUTO: 93 K/UL (ref 150–350)
POTASSIUM SERPL-SCNC: 3.9 MEQ/L (ref 3.5–5.1)
POTASSIUM SERPL-SCNC: 4.4 MEQ/L (ref 3.5–5.1)
RBC # BLD AUTO: 2.98 M/UL (ref 4.5–5.8)
SODIUM SERPL-SCNC: 141 MEQ/L (ref 136–145)
SODIUM SERPL-SCNC: 144 MEQ/L (ref 136–145)
WBC # BLD AUTO: 5.44 K/UL (ref 3.8–10.5)

## 2024-09-25 PROCEDURE — 85027 COMPLETE CBC AUTOMATED: CPT

## 2024-09-25 PROCEDURE — 97166 OT EVAL MOD COMPLEX 45 MIN: CPT | Mod: GO

## 2024-09-25 PROCEDURE — 99233 SBSQ HOSP IP/OBS HIGH 50: CPT | Performed by: HOSPITALIST

## 2024-09-25 PROCEDURE — 63700000 HC SELF-ADMINISTRABLE DRUG

## 2024-09-25 PROCEDURE — 63600000 HC DRUGS/DETAIL CODE: Mod: JZ

## 2024-09-25 PROCEDURE — 97162 PT EVAL MOD COMPLEX 30 MIN: CPT | Mod: GP

## 2024-09-25 PROCEDURE — 21400000 HC ROOM AND CARE CCU/INTERMEDIATE

## 2024-09-25 PROCEDURE — 83735 ASSAY OF MAGNESIUM: CPT

## 2024-09-25 PROCEDURE — 80048 BASIC METABOLIC PNL TOTAL CA: CPT

## 2024-09-25 PROCEDURE — 36415 COLL VENOUS BLD VENIPUNCTURE: CPT

## 2024-09-25 RX ORDER — TORSEMIDE 20 MG/1
10 TABLET ORAL DAILY
Status: DISCONTINUED | OUTPATIENT
Start: 2024-09-26 | End: 2024-09-26 | Stop reason: HOSPADM

## 2024-09-25 RX ADMIN — LEVOTHYROXINE SODIUM 100 MCG: 0.1 TABLET ORAL at 05:16

## 2024-09-25 RX ADMIN — TAMSULOSIN HYDROCHLORIDE 0.4 MG: 0.4 CAPSULE ORAL at 08:16

## 2024-09-25 RX ADMIN — CLOPIDOGREL 75 MG: 75 TABLET ORAL at 08:16

## 2024-09-25 RX ADMIN — ENOXAPARIN SODIUM 30 MG: 30 INJECTION SUBCUTANEOUS at 17:05

## 2024-09-25 RX ADMIN — FUROSEMIDE 40 MG: 10 INJECTION, SOLUTION INTRAMUSCULAR; INTRAVENOUS at 08:16

## 2024-09-25 RX ADMIN — METOPROLOL SUCCINATE 12.5 MG: 25 TABLET, EXTENDED RELEASE ORAL at 17:13

## 2024-09-25 RX ADMIN — ESCITALOPRAM OXALATE 5 MG: 5 TABLET, FILM COATED ORAL at 08:16

## 2024-09-25 ASSESSMENT — COGNITIVE AND FUNCTIONAL STATUS - GENERAL
HELP NEEDED FOR PERSONAL GROOMING: 3 - A LITTLE
WALKING IN HOSPITAL ROOM: 3 - A LITTLE
STANDING UP FROM CHAIR USING ARMS: 2 - A LOT
DRESSING REGULAR LOWER BODY CLOTHING: 1 - TOTAL
WALKING IN HOSPITAL ROOM: 2 - A LOT
AFFECT: WFL
CLIMB 3 TO 5 STEPS WITH RAILING: 2 - A LOT
CLIMB 3 TO 5 STEPS WITH RAILING: 2 - A LOT
HELP NEEDED FOR BATHING: 2 - A LOT
WALKING IN HOSPITAL ROOM: 3 - A LITTLE
MOVING TO AND FROM BED TO CHAIR: 3 - A LITTLE
MOVING TO AND FROM BED TO CHAIR: 3 - A LITTLE
EATING MEALS: 3 - A LITTLE
STANDING UP FROM CHAIR USING ARMS: 3 - A LITTLE
STANDING UP FROM CHAIR USING ARMS: 3 - A LITTLE
AFFECT: WFL
TOILETING: 2 - A LOT
MOVING TO AND FROM BED TO CHAIR: 3 - A LITTLE
CLIMB 3 TO 5 STEPS WITH RAILING: 1 - TOTAL
DRESSING REGULAR UPPER BODY CLOTHING: 3 - A LITTLE

## 2024-09-25 NOTE — PROGRESS NOTES
Physical Therapy -  Initial Evaluation     Patient: Samy Elena  Location: [place] [address]  MRN: [id number]  Today's date: 9/25/2024    HISTORY OF PRESENT ILLNESS     Samy is a 89 y.o. male admitted on 9/23/2024 with Shortness of breath [R06.02]  Pleural effusion [J90]  Acute decompensated heart failure (CMS/HCC) [I50.9]  Anemia, unspecified type [D64.9]  Chronic kidney disease, unspecified CKD stage [N18.9]  Acute on chronic congestive heart failure, unspecified heart failure type (CMS/HCC) [I50.9]. Principal problem is Acute decompensated heart failure (CMS/HCC).    Past Medical History  Samy has a past medical history of Aneurysm of internal iliac artery (CMS/[facility]), Atrial fibrillation (CMS/[facility]), CHF (congestive heart failure) (CMS/[facility]), 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[facility]), Coronary artery disease, Disease of thyroid gland, Will catheter in place, GI (gastrointestinal bleed), Hypertension, Myocardial infarction (CMS/[facility]), Orthostatic hypotension, and TIA (transient ischemic attack).    History of Present Illness   Presents by EMS for concern for fluid overload.     S/p US guided L thoracentesis with 900 mL removed 9/24  PRIOR LEVEL OF FUNCTION AND LIVING ENVIRONMENT     Prior Level of Function      Flowsheet Row Most Recent Value   Dominant Hand right   Ambulation assistive equipment and person   Transferring assistive equipment and person   Toileting assistive person   Bathing assistive equipment and person  [primarily sponge bathes]   Dressing assistive person   Eating independent   IADLs assistive person   Driving/Transportation friends/family   Prior Level of Function Comment pt has 24/7 HHA assists c ADLs, IALs, transfers, mostly uses w/c for mobility, occasional short distance amb with RW   Assistive Device Currently Used at Home wheelchair, ramps, walker, front-wheeled, walker, 4-wheeled, hospital bed             Prior Living Environment      Flowsheet Row Most Recent Value  "  People in Home other (see comments)   Current Living Arrangements home   Living Environment Comment pt has 24/7 HHA assists c ADLs, IALs, transfers, and amb c RW          VITALS AND PAIN     PT Vitals      Date/Time Pulse HR Source SpO2 Pt Activity O2 Therapy O2 Del Method O2 Flow Rate BP BP Location BP Method Pt Position Observations [facility]   09/25/24 1134 71 Monitor 96 % At rest Supplemental oxygen Nasal cannula 2 L/min 84/47 Right upper arm Automatic Lying -- MM   09/25/24 1135 -- -- -- -- -- -- -- 98/51 Left upper arm Automatic Lying -- MM   09/25/24 1138 -- -- -- -- -- -- -- 81/47 Left upper arm Automatic Sitting seated EOB; asymptomatic MM   09/25/24 1140 65 Monitor 98 % At rest Supplemental oxygen Nasal cannula 2 L/min 81/47 Left upper arm Automatic Sitting post AROM B UE/LEs EOB; EOB MM          PT Pain      Date/Time Pain Type Rating: Rest Rating: Activity [facility]   09/25/24 1134 Pain Assessment 0 - no pain 0 - no pain MM             Objective   OBJECTIVE     Start time:  1134  End time:  1153  Session Length: 19 min  Mode of Treatment: co-treatment  Reason for co-treatment: to maximize participation    General Observations  Patient received supine, in bed. He was agreeable to therapy, no issues or concerns identified by nurse prior to session. sonEthan, present for session    Precautions: fall, aspiration, oxygen therapy device and L/min       Limitations/Impairments: hearing, safety/cognitive      PT Eval and Treat - 09/25/24 1134          Cognition    Orientation Status oriented to;person;place;time;verbal cues/prompts needed for orientation   initially states \"May \" for month, self corrects to \"September\". use of white board for day; +year.    Affect/Mental Status WFL     Follows Commands follows one-step commands;verbal cues/prompting required;repetition of directions required   repetition of commands d/t [place]    Cognitive Function WFL;executive function deficit     Executive Function Deficit " insight/awareness of deficits     Comment, Executive Function decreased insight to BP drops and implications for safe mobility     Comment, Cognition Pt very pleasant, cooperative. motivated and eager to work with therapy. decreased insight to BP lability and implications for safe mobility- but receptive to edu, recommendations. expressing gratitiude towards therapists post session.        Vision Assessment/Intervention    Vision Assessment corrective lenses full-time        Hearing Assessment    Hearing Status hearing impairment, bilaterally;hearing aid, bilateral     Impact of Hearing Impairment on Functional Status increased volume of voice benefits pt        Sensory Assessment    Sensory Assessment sensation intact, lower extremities        Lower Extremity Assessment    LE Assessment ROM and Strength WFL except     General Observations knee ROM limited at least 10 deg b /l        Bed Mobility    Bed Mobility Activities right;supine to sit;sit to supine     [place] minimum assist (75% or more patient effort)     Safety/Cues increased time to complete;minimal;verbal cues;sequencing;technique;hand placement     Assistive Device bed rails;draw sheet;head of bed elevated     Comment increased time to complete mobility, A required to bring LEs over EOB and trunk upright. denies LH/dizzines at EOB but + OBP. Attempted to improve BP value with seated TE but unchanged after ~ 3min        Sit/Stand Transfer    Surface edge of bed     [place] minimum assist (75% or more patient effort);1 person assist     Safety/Cues increased time to complete;minimal;verbal cues;hand placement;technique;sequencing     Assistive Device walker, front-wheeled     Transfer Comments Robbi for steadying, lift assist from EOB. +Forward flexed posture and NBOS. able to take shuffling steps towards HOB with minAx2 and RW        Gait Training    Bland, Gait minimum assist (75% or more patient effort);2 person assist     Safety/Cues  technique;sequencing;proper use of assistive device     Assistive Device walker, front-wheeled     Distance in Feet 2 feet     Comment short, slow, unsteady steps. min A to steady        Balance    Static Sitting Balance WFL;sitting, edge of bed     Dynamic Sitting Balance mild impairment;unsupported;sitting, edge of bed     Sit to Stand Dynamic Balance mild impairment;supported     Static Standing Balance mild impairment;supported     Dynamic Standing Balance mild impairment;supported     Comment, Balance with RW, unable to assess ambulation        Lower Extremity (Therapeutic Exercise)    Comment seated LAQ, marches, ankle pumps x10 ea. no change in BP        Impairments/Safety Issues    Impairments Affecting Function balance;strength;endurance/activity tolerance     Functional Endurance close to baseline                                    Education Documentation  Treatment Plan, taught by Penny Franco PT at 9/25/2024  2:07 PM.  Learner: Family, Patient  Readiness: Acceptance  Method: Explanation, Demonstration  Response: Verbalizes Understanding, Demonstrated Understanding  Comment: PT role and goals, treatment technqiues, safety, recs at d/c        Session Outcome  Patient reclined, in bed at end of session, all needs met, call light in reach, personal items in reach. Nursing notified about patient's performance, patient's position, and patient's response to therapy/activity.    AM-PAC™ - Mobility (Current Function)     Turning form your back to your side while in flat bed without using bedrails 3 - A Little   Moving from lying on your back to sitting on the side of a flat bed without using bedrails 3 - A Little   Moving to and from a bed to a chair 3 - A Little   Standing up from a chair using your arms 3 - A Little   To walk in a hospital room 3 - A Little   Climbing 3-5 steps with a railing 2 - A Lot   AM-PAC™ Mobility Score 17      ASSESSMENT AND PLAN     Progress Summary  PT eval complete. Pt is  min A for bed mob, STS, and sidesteps toward EOB. O2 stable through session. Pt receives A at home for most ADLs, IADLs, and mobility and does not appear to be far from baseline. Rec home with typical A and home PT    Patient/Family Therapy Goals Statement: to get home PT    PT Plan      Flowsheet Row Most Recent Value   Rehab Potential good, to achieve stated therapy goals at 09/25/2024 1134   Therapy Frequency 4 times/wk at 09/25/2024 1134   Planned Therapy Interventions balance training, bed mobility training, gait training, patient/family education, strengthening, transfer training at 09/25/2024 1134            PT Discharge Recommendations      Flowsheet Row Most Recent Value   PT Recommended Discharge Disposition home with assistance, home with home health at 09/25/2024 1134   Anticipated Equipment Needs if Discharged Home (PT) none at 09/25/2024 1134                 PT Goals      Flowsheet Row Most Recent Value   Bed Mobility Goal 1    Activity/Assistive Device bed mobility activities, all at 09/25/2024 1134   [place] supervision required at 09/25/2024 1134   Time Frame by discharge at 09/25/2024 1134   Transfer Goal 1    Activity/Assistive Device sit-to-stand/stand-to-sit, walker, front-wheeled at 09/25/2024 1134   [place] supervision required at 09/25/2024 1134   Time Frame by discharge at 09/25/2024 1134   Progress/Outcome goal ongoing at 09/25/2024 1134   Gait Training Goal 1    Activity/Assistive Device gait (walking locomotion), walker, front-wheeled at 09/25/2024 1134   [place] minimum assist (75% or more patient effort) at 09/25/2024 1134   Distance 10 at 09/25/2024 1134   Time Frame by discharge at 09/25/2024 1134   Progress/Outcome goal ongoing at 09/25/2024 1134

## 2024-09-25 NOTE — DISCHARGE SUMMARY
Internal Medicine  Inpatient Discharge Summary        BRIEF OVERVIEW   Admitting Provider: Akiko Tillman DO  Attending Provider: Rhoda Herrera MD Attending phys phone: [phone]    PCP: Zachery Hernandez [phone]    Admission Date: 9/23/2024  Discharge Date: 9/26/2024     DISCHARGE DIAGNOSES      Primary Discharge Diagnosis  Acute decompensated heart failure (CMS/Formerly [facility])    Secondary Discharge Diagnoses  Active Hospital Problems    Diagnosis Date Noted    Acute decompensated heart failure (CMS/Formerly [facility]) 09/24/2024    Dysphagia 09/24/2024    A-fib (CMS/Formerly [facility]) 07/28/2024    Anemia due to chronic kidney disease 07/02/2024    CKD (chronic kidney disease) stage 4, GFR 15-29 ml/min (CMS/HCC) 12/03/2023    Hypothyroidism 11/29/2023    Coronary artery disease 08/03/2023      Resolved Hospital Problems   No resolved problems to display.       Active Problem List on Day of Discharge  Patient Active Problem List   Diagnosis    History of transient ischemic attack (TIA)    Hypertension    Ischemic congestive cardiomyopathy (CMS/Former[facility])    Hyperglycemia    Ataxia    Palliative care by specialist    Debility    Parastomal hernia    Coronary artery disease    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Former[facility])    Hypertension    Atrial fibrillation (CMS/[facility])    Elevated troponin    Orthostatic hypotension    Hyperlipidemia    Hypothyroidism    Depression    CKD (chronic kidney disease) stage 4, GFR 15-29 ml/min (CMS/Former[facility])    Pressure ulcer    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)    Multiple medical problems    History of recurrent UTIs    BMI 21.0-21.9, adult    GERD (gastroesophageal reflux disease)    Preop examination    At risk for obstructive sleep apnea    Anemia due to chronic kidney disease    History of chronic CHF    Thrombocytopenia (CMS/[facility])    Glaucoma    Age-related cataract of right eye    Encephalopathy    HFrEF (heart failure with reduced ejection fraction) (CMS/Former[facility])    History of recurrent UTIs    Shortness of breath     A-fib (CMS/Former[facility])    Hypertension    CALI (acute kidney injury) (CMS/Formerly [facility])    Acute decompensated heart failure (CMS/Formerly [facility])    Dysphagia     SUMMARY OF HOSPITALIZATION      Presenting Problem/History of Present Illness.  89-year-old male with past medical history of A-fib, CHF (EF 45-50%), CAD, GI bleed, hypertension, TIA, MI, who was brought to the ED by EMS on 24 for concern for fluid overload.     Prior to admission, patient reported worsening back pain as well as shortness of breath and associated abdominal distention.  Also reported several episodes of nausea with emesis.  Denied any fever, chills reported a chronic cough.  Patient reported medication compliance.  He has been admitted numerous times for acute on chronic CHF exacerbation and was admitted  to  for altered mental status 2/2 pneumonia/UTI.     ED Course:   Vitals:  Temp:  [36.5 °C (97.7 °F)] 36.5 °C (97.7 °F)  Heart Rate:  [75-84] 75  Resp:  [16-18] 16  BP: (125-138)/(66-86) 133/66  SpO2:  [92 %-97 %] 96 %  Oxygen Therapy: Supplemental oxygen  O2 Delivery Method: Nasal cannula     Labs  BMP: Na 142, K 4.0, BUN 37, Cr 1.9, bicarb 27, AG 8  CBC: WBC 4.94, hemoglobin 9.7, hematocrit 30.5, .7, platelets 99  Cardiac: hsTroponin 29.3,   Liver: AST 45, ALT 10, ALP 85, albumin 3.3, Tbili 1.4  VB.42/40   UA: 20 to 35 WBC, +2 leukocyte esterase     Imaging  CT head:   No evidence of hemorrhage, mass or acute territorial infarction by   CT criteria.   Chest xray pending      EKG: afib      Interventions:  Lasix 40 mg IV once     Hospital Course  #Acute respiratory failure with hypoxia  #acute on chronic HF - mildly reduced EF 40-45%, RV dysfunction, torrential TR  #Chronic Left effusion  - Repeat TTE  with no significant change from prior, EF 40 to 45%, torrential TR, massively dilated RV with decreased systolic function  - Underwent left sided thoracentesis with 9 mL of fluid removed  -Transudative effusion by lights criteria  (total serum protein 8.2, pleural fluid protein 3.6, serum , pleural fluid LDH 99)  - Bed scale weight 158 at the time of discharge  - Received 1 day of IV diuretic, was discharged on his home torsemide  - Will refer to pulmonology at discharge for consideration of outpatient thoracentesis versus Pleurx catheter if he continues to have recurrent pleural effusions    #Dysphagia hx   - Initially made n.p.o. with plans for SLP evaluation, though patient previously failed VFSS with all textures  and shared decision made with the patient's family to continue comfort feeding    #Hypothyroidism   -Continue levothyroxine 100 mg   -Follow up with PCP outpatient       Important Issues to Address in Follow-Up  PCP: Repeat BMP/Mg/CBC in 1 week    Pulmonology: Monitor effusion, can consider Pleurx catheter if continuously recurrent      Exam on Day of Discharge  Physical Exam  Constitutional:       General: He is not in acute distress.  HENT:      Head: Normocephalic and atraumatic.   Cardiovascular:      Rate and Rhythm: Normal rate and regular rhythm.      Pulses: Normal pulses.      Heart sounds: Normal heart sounds.   Pulmonary:      Effort: No respiratory distress.      Breath sounds: No wheezing.   Abdominal:      General: There is no distension.      Tenderness: There is no abdominal tenderness.   Musculoskeletal:      Right lower leg: No edema.      Left lower leg: No edema.   Skin:     General: Skin is warm and dry.   Neurological:      General: No focal deficit present.      Mental Status: He is alert. Mental status is at baseline.   Psychiatric:         Mood and Affect: Mood normal.         Behavior: Behavior normal.         Consults During Admission  IP CONSULT TO CASE MANAGEMENT  IP CONSULT TO NUTRITION SERVICES  IP CONSULT TO WOUND OSTOMY CONTINENCE    DISCHARGE MEDICATIONS          New, changed, or stopped medications from this admission:       Medication List        CONTINUE taking these medications       calcium (as carbonate) 200 mg calcium (500 mg) chewable tablet  Commonly known as: TUMS  Take 1 tablet by mouth 2 (two) times a day.  Dose: 1 tablet     carboxymethylcellulose 0.5 % dropperette  Commonly known as: REFRESH PLUS  Administer 1 drop into both eyes 4 (four) times a day as needed for dry eyes.  Dose: 1 drop     clopidogreL 75 mg tablet  Commonly known as: PLAVIX  Take 75 mg by mouth every morning.  Dose: 75 mg     cyanocobalamin 500 mcg tablet  Commonly known as: VITAMIN B12  Take 500 mcg by mouth every morning.  Dose: 500 mcg     escitalopram 5 mg tablet  Commonly known as: LEXAPRO  Take 5 mg by mouth every morning.  Dose: 5 mg     famotidine 10 mg tablet  Commonly known as: PEPCID  Take 1 tablet (10 mg total) by mouth daily Indications: gastroesophageal reflux disease.  Dose: 10 mg     finasteride 5 mg tablet  Commonly known as: PROSCAR  Take 1 tablet (5 mg total) by mouth daily.  Dose: 5 mg     levothyroxine 100 mcg tablet  Commonly known as: SYNTHROID  Take 100 mcg by mouth daily.  Dose: 100 mcg     lidocaine 4 % adhesive patch,medicated topical patch  Commonly known as: ASPERCREME  Apply 1 patch topically daily as needed. Remove & discard patch within 12 hours or as directed by prescriber.  Dose: 1 patch     LUMIGAN 0.01 % ophthalmic drops  Administer 1 drop into the left eye nightly.  Dose: 1 drop  Generic drug: bimatoprost     melatonin 3 mg tablet  Take 3 mg by mouth nightly.  Dose: 3 mg     methenamine 1 gram tablet  Commonly known as: HIPREX  Take 1 g by mouth daily before lunch.  Dose: 1 g     metoprolol succinate XL 25 mg 24 hr tablet  Commonly known as: TOPROL-XL  Take 12.5 mg by mouth daily with dinner.  Dose: 12.5 mg     omeprazole 20 mg capsule  Commonly known as: PriLOSEC  Take 20 mg by mouth daily with lunch.  Dose: 20 mg     potassium chloride 10 mEq CR capsule  Commonly known as: MICRO-K  Take 10 mEq by mouth 2 (two) times a day.  Dose: 10 mEq     tamsulosin 0.4 mg  capsule  Commonly known as: FLOMAX  Take 1 capsule (0.4 mg total) by mouth daily.  Dose: 0.4 mg     thiamine 50 mg tablet  Take 50 mg by mouth daily.  Dose: 50 mg     tobramycin 0.3 % ophthalmic solution  Commonly known as: TOBREX  Administer 1 drop into the right eye 3 (three) times a day.  Dose: 1 drop     torsemide 10 mg tablet  Commonly known as: DEMADEX  Take 10 mg by mouth daily.  Dose: 10 mg            STOP taking these medications      magnesium 200 mg tablet              Non-Medication orders at discharge:   Instructions for after discharge       Call provider for:  difficulty breathing, headache or visual disturbances      Call provider for:  extreme fatigue      Call provider for:  persistent dizziness or light-headedness      Call provider for:  persistent nausea or vomiting      Call provider for:  rash      Call provider for:  redness, tenderness, or signs of infection (pain, swelling, redness, odor or green/yellow discharge around incision site)      Call provider for:  severe uncontrolled pain      Call provider for:  temperature >100.4      Follow-up with Provider:      Amandeep Kyle MD   [phone]    [address]  [place], [facility] [address]  [place] [id number]       Follow-up with primary physician (PCP)                        PROCEDURES / LABS / IMAGING      Operative Procedures  na    Other Procedures  L thoracentesis    Pertinent Labs  Results from last 7 days   Lab Units 09/26/24  0527   WBC K/uL 5.86   HEMOGLOBIN g/dL 9.7*   HEMATOCRIT % 30.7*   PLATELETS K/uL 115*     Results from last 7 days   Lab Units 09/26/24  0527 09/24/24  0741 09/23/24  2211   SODIUM mEQ/L 141   < > 142   POTASSIUM mEQ/L 3.7   < > 4.0   CHLORIDE mEQ/L 104   < > 107   CO2 mEQ/L 27   < > 27   BUN mg/dL 35*   < > 37*   CREATININE mg/dL 1.7*   < > 1.9*   CALCIUM mg/dL 8.4*   < > 8.9   ALBUMIN g/dL  --   --  3.3*   BILIRUBIN TOTAL mg/dL  --   --  1.4*   ALK PHOS IU/L  --   --  85   ALT IU/L  --   --  10   AST IU/L  --    --  45*   GLUCOSE mg/dL 148*   < > 179*    < > = values in this interval not displayed.     Pertinent Imaging  IR THORACENTESIS    Result Date: 9/24/2024  IMPRESSION: Successful ultrasound-guided left thoracentesis with 900 mL removed and sent to the lab.  All components of the time-out, debriefing, and handling of the specimen were conducted as per the ASAP Specimen Protocol. I certify that I have personally reviewed this examination and agree with Betsy Benitez's report. Elio Espinoza M.D.    X-RAY CHEST 1 VIEW    Result Date: 9/24/2024  IMPRESSION: Evaluation limited by positioning with the apices excluded on both views. Status post left thoracentesis without appreciable pneumothorax. Moderate residual left effusion with underlying atelectasis/consolidative opacity and small right effusion.  Assessment of the right chest is further limited by the patient's hand which overlies the hemithorax.     X-RAY CHEST 1 VIEW    Result Date: 9/24/2024  IMPRESSION: New moderate right effusion and worsening large left pleural effusion with increasing airspace disease in the left upper lobe and vascular congestion. Likely related to decompensated heart failure/CHF, with superimposed pneumonia not excluded in the appropriate setting.     CT HEAD WITHOUT IV CONTRAST    Result Date: 9/23/2024  IMPRESSION: No evidence of hemorrhage, mass or acute territorial infarction by CT criteria. COMMENT: Technique: Computed tomography of the brain was performed utilizing contiguous 2.5 mm transaxial sections without intravenous contrast administration. CT DOSE:  One or more dose reduction techniques (e.g. automated exposure control, adjustment of the mA and/or kV according to patient size, use of iterative reconstruction technique) utilized for this examination. Comparison studies: Head CT 7/28/2024. Postsurgical change: None. Brain parenchyma: There is no intraparenchymal hemorrhage, mass effect, midline shift or extra-axial fluid  collection. White matter changes: Normal for age Ventricles, cisterns, and sulci: Normal in size and configuration. Sella: Normal in appearance. Cerebellar tonsils: Normal. Calvarium and extra cranial soft tissues: Normal. Paranasal sinuses: Clear bilaterally. Mastoid air cell: Normal. Orbits: Normal.      OUTPATIENT  FOLLOW-UP / REFERRALS / PENDING TESTS      Outpatient Follow-Up Appointments            In 4 months Mat Bryant MD [facility]            Referrals  No orders of the defined types were placed in this encounter.      Test Results Pending at Discharge  Pending Inpatient Labs       Order Current Status    Body Fluid Culture/Smear Pleural Fluid, Left Preliminary result            DISCHARGE DISPOSITION      Disposition:      Code Status At Discharge: DNR (A.N.D.)    Physician Order for Life-Sustaining Treatment Document Status        No documents found                     ATTENDING DOCUMENTATION  ALSO SEE ATTENDING ATTESTATION SECTION OF NOTE

## 2024-09-25 NOTE — PLAN OF CARE
Problem: Adult Inpatient Plan of Care  Goal: Plan of Care Review  Outcome: Progressing  Flowsheets (Taken 9/25/2024 1407)  Outcome Evaluation: Pt AAOx4. Pt denies pain, SOB, n/v. VSS on 2L NC. See flowsheet for full nursing assessment. Pt OOB x2 assist. Pt utilizing incontinence pads to void. FSP maintained. Call bell and all needs within reach.

## 2024-09-25 NOTE — HOSPITAL COURSE
Samy Elena is a 89 y/o male with history of heart failure with mildly reduced ejection fraction, stage IV chronic kidney disease and dysphagia who presented with shortness of breath, abdominal distension and nausea and vomiting. He was found to be in acute decompensated heart failure on top of his chronic congestive heart failure.    #Acute decompensated heart failure  - Patient presented with volume overload with abdominal distension, JVD, crackles at the base of lungs  - Chest xray showed vascular congestion and bilateral pleural effusion worse on the left side, worsened in comparison to previous CXR on 7/31/24  -   - Echocardiogram showed wide-open ticuspid regurgitation with single chamber physiology  - He received 40mg lasix qDay which relieved his volume overload.  - Patient underwent left pleural thoracentesis with 900 mL of fluid recovered, reporting significant symptomatic improvement  - He was on BIPAP overnight and received up to 4L nasal cannula, weaning down to room air  - Strict intake and output was monitored

## 2024-09-25 NOTE — PROGRESS NOTES
[facility]. Referral received from care coordinator. Following for home care needs. Referral to be completed for home care contact. Ami Soto [phone]

## 2024-09-25 NOTE — PLAN OF CARE
Problem: Adult Inpatient Plan of Care  Goal: Plan of Care Review  Outcome: Progressing  Flowsheets (Taken 9/25/2024 9340)  Progress: improving  Outcome Evaluation: PT eval complete. Rec home with typical A and home PT  Plan of Care Reviewed With:   patient   child     Problem: Mobility Impairment  Goal: Optimal Mobility  Outcome: Progressing

## 2024-09-25 NOTE — ASSESSMENT & PLAN NOTE
Anemia with hb 9.7, consistent with previous labs, no acute changes  Iron studies show low iron sat, normal iron and ferritin

## 2024-09-25 NOTE — CONSULTS
Wound Ostomy Continence Note    Subjective    HPI Patient is a 89 y.o. male who was admitted on 9/23/2024 with a diagnosis of Shortness of breath [R06.02]  Pleural effusion [J90]  Acute decompensated heart failure (CMS/HCC) [I50.9]  Anemia, unspecified type [D64.9]  Chronic kidney disease, unspecified CKD stage [N18.9]  Acute on chronic congestive heart failure, unspecified heart failure type (CMS/HCC) [I50.9].    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    Atrial fibrillation (CMS/HCC)     CHF (congestive heart failure) (CMS/formerly [facility])     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formerly [facility])     Coronary artery disease     Disease of thyroid gland     Will catheter in place     6/25 not at present    GI (gastrointestinal bleed)     Hypertension     Myocardial infarction (CMS/formerly [facility])     Orthostatic hypotension     TIA (transient ischemic attack)      Past Surgical History   Procedure Laterality Date    cataract extraction right eye with implant and limbal relaxing incision Right 7/18/2024    Performed by Hayder Moses MD at  OR [facility]    Cataract extraction w/ intraocular lens implant Left     Cataract extraction with LRI and anterior vitrectomy left eye Left 11/16/2023    Performed by Hayder Moses MD at  OR [facility]    Cholecystectomy      Colostomy      COLOSTOMY LAPAROSCOPIC N/A 8/3/2022    Performed by Mat Bryant MD at [facility] [place]    Coronary artery bypass graft      INCISION AND DRAINAGE WITH DEBRIDMENT OF BUTTOCK, AND PERINEUM N/A 8/2/2022    Performed by Mat Bryant MD at [facility] OR    Joint replacement Left     Knee    RIGHT HEART CATH N/A 8/4/2022    [place] Cristal [place] [facility] [place] [place]    Thyroidectomy      WASHOUT OF PERINEAL WOUND, COLONIC LAVAGE N/A 8/3/2022    [place] [facility] [place]      Assessment and Recommendation C/s for ostomy bag;  --ostomy at [facility], 1 piece pouch on, no leak, stoma protruding  --continue one piece (PS# [id number]) or two piece pouch system  --sign off    Plan of care dicussed with pt and nursing at bedside and unit;               Date: 09/25/24  Signature: Madonna [place] RN

## 2024-09-25 NOTE — PLAN OF CARE
Care Coordination Admission Assessment Note    General Information:  Readmission Within the last 30 days: no previous admission in last 30 days  Does patient have a :    Patient-Specific Goals (include timeframe): To be less SOB by discharge    Living Arrangements:  Arrived From: home  Current Living Arrangements: home  People in Home: other (see comments) (24/7 paid CG)  Home Accessibility:    Living Arrangement Comments:      Housing Stability and Utility Access (SDOH):  In the last 12 months, was there a time when you were not able to pay the mortgage or rent on time?: No  In the past 12 months, how many times have you moved?: 0  At any time in the past 12 months, were you homeless or living in a shelter (including now)?: No  In the past 12 months has the electric, gas, oil, or water company threatened to shut off services in your home?: No    Functional Status Prior to Admission:   Assistive Device/Animal Currently Used at Home: wheelchair, ramps, walker, front-wheeled, walker, 4-wheeled, hospital bed  Functional Status Comments: Requires assistance with a walker, and uses a WC as needed  IADL Comments:       Supports and Services:  Current Outpatient/Agency/Support Group: homecare agency  Type of Current Home Care Services: home PT, home OT, nursing  History of home care episode or rehab stay:      Discharge Needs Assessment:   Concerns to be Addressed: discharge planning, care coordination/care conferences  Current Discharge Risk: chronically ill  Anticipated Changes Related to Illness: none    Patient/Family Anticipated Discharge Plan:  Patient/Family Anticipates Transition To: home with help/services  Patient/Family Anticipated Services at Transition: home health care    Connection to Community       Patient Choice:   Offered/Gave Vendor List: yes  Patient's Choice of Community Agency(s): [facility]  Patient and/or patient guardian/advocate was made aware of their right to choose a provider. A  list of eligible providers was presented and reviewed with the patient and/or patient guardian/advocate in written and/or verbal form. The list delineates providers in the patient’s desired geographic area who are participating in the Medicare program and/or providers contracted with the patient’s primary insurance. The Medicare list and quality ratings were obtained from the Medicare.gov [medicare.gov] website.    Anticipated Discharge Plan:  Met with patient. Provided education and contact information for Care Coordination services.: yes  Anticipated Discharge Disposition: home with home health  Type of Home Care Services: home PT, nursing    Transportation Needs (SDOH):  Transportation Concerns: none  Transportation Anticipated: family or friend will provide  Is Out of Hospital DNR needed at discharge?: no    In the past 12 months, has lack of transportation kept you from medical appointments or from getting medications?: No  In the past 12 months, has lack of transportation kept you from meetings, work, or from getting things needed for daily living?: No    Concerns - comments: Discharge planning initiated with patient's son Ethna. PCP, pharmacy, insurance, contact info, and address verified. Patient is agreeable to  services, referral placed for [facility] . Patient also able to afford his medication co-pays and has working scale. The patient can obtain transportation at discharge, family will provide. CC will continue to follow for transition of care needs until discharge is complete.

## 2024-09-25 NOTE — PROGRESS NOTES
Internal Medicine  Daily Progress Note       Assessment & Plan  Acute decompensated heart failure (CMS/HCC)  Pt with a hx of HFmrEF (EF 40-45% on TTE 9/24)  Patient presenting with volume overload with abdominal distention, JVD.   Chest xray showing vascular congestion and bilateral pleural effusion worse on left side. This effusion is worsened in comparison to previous CXR on 7/31/24     Echo showing large coaptation gap of the tricuspid leaflets. Wide-open tricuspid regurgitation with single chamber physiology. Estimated EF 40-45%. No significant change from previous ECHO on 3/28/24  BIPAP overnight, weaning from 4 L nasal cannula  s/p left pleural thoracentesis 900ml clear yellow fluid, exudative effusion. F/u CXR showed moderate residual L effusion and no appreciable pneumothorax  Patient clinically improved with resolved SOB and cough    - Lasix discontinued this morning after morning dose in setting of improved clinical presentation and low BP  - weaned to 2LNC in room while satting %. Will wean to RA and plan discharge  - PT/OT     Anemia due to chronic kidney disease  Anemia with hb 9.7, consistent with previous labs, no acute changes  Iron studies show low iron sat, normal iron and ferritin  Coronary artery disease  CAD post CABG in 1996     -Continue home plavix      Hypothyroidism  Continue levothyroxine 100   CKD (chronic kidney disease) stage 4, GFR 15-29 ml/min (CMS/[facility])  Appears around baseline   Cr 1.9 -> 1.7 stable     A-fib (CMS/[facility])  On metoprolol succinate 25 daily and not on AC   Dysphagia  Failed a VFSS during previous admission with speech language pathology and family elected to allow eating for comfort. Spoke with family and they would like to continue eating or comfort, understand the risks of aspiration    - resume soft and bite sized diet  - aspiration precautions    SUBJECTIVE   This is a 89 y.o. year-old male admitted on 9/23/2024 with Shortness of breath  [R06.02]  Pleural effusion [J90]  Acute decompensated heart failure (CMS/HCC) [I50.9]  Anemia, unspecified type [D64.9]  Chronic kidney disease, unspecified CKD stage [N18.9]  Acute on chronic congestive heart failure, unspecified heart failure type (CMS/HCC) [I50.9].    Interval History: Patient seen and examined at bedside. Pt feels much better today after thoracentesis. He was on BIPAP overnight and had no acute events. He no longer feels short of breath. Has been able to ambulate with walker and void and stool independently. He was able to wean his nasal cannula to 2L with no drop in O2 sat.     OBJECTIVE      Vital signs in last 24 hours:  Temp:  [36.5 °C (97.7 °F)-36.7 °C (98.1 °F)] 36.5 °C (97.7 °F)  Heart Rate:  [56-79] 66  Resp:  [16-20] 18  BP: ()/(49-69) 88/52  Temp (24hrs), Av.6 °C (97.9 °F), Min:36.5 °C (97.7 °F), Max:36.7 °C (98.1 °F)    Intake/Output       Intake Evening 24 1500 - 24 Night 24 230 - 24 0659 Day 24 07 - 24 1459 Output Evening 24 - 24 Night 24 - 24 0659 Day 24 07 - 24 1459    I.V. -- -- 10 Urine -- -- --    IV Piggyback -- -- -- Other -- -- --    Total -- -- 10 Total -- -- --   Last 3 shifts --  Intake: 10       Output: 0       Net: 10         PHYSICAL EXAMINATION      Physical Exam  Constitutional:       General: He is not in acute distress.     Appearance: Normal appearance.   HENT:      Head: Normocephalic and atraumatic.   Eyes:      Pupils: Pupils are equal, round, and reactive to light.   Cardiovascular:      Rate and Rhythm: Normal rate. Rhythm irregular.      Pulses: Normal pulses.      Heart sounds: Normal heart sounds. No murmur heard.  Pulmonary:      Effort: Pulmonary effort is normal. No respiratory distress.      Breath sounds: Normal breath sounds. No wheezing or rales.   Abdominal:      General: Abdomen is flat.      Palpations: Abdomen is soft.      Tenderness:  There is no abdominal tenderness.      Comments: Ostomy site CDI   Musculoskeletal:         General: No swelling.   Skin:     General: Skin is warm and dry.   Neurological:      General: No focal deficit present.      Mental Status: He is alert and oriented to person, place, and time.   Psychiatric:         Mood and Affect: Mood normal.         Behavior: Behavior normal.            LINES, CATHETERS, DRAINS, AIRWAYS, AND WOUNDS   Lines, Drains, Airways, Wounds:  Peripheral IV (Adult) 09/25/24 Anterior;Right Forearm (Active)   Number of days: 0       Comments:      LABS / IMAGING / TELE      Labs  I have reviewed the patient's pertinent labs. Pertinent labs are within normal limits.    Imaging personally reviewed(does not include unread studies):  IR THORACENTESIS    Result Date: 9/24/2024  IMPRESSION: Successful ultrasound-guided left thoracentesis with 900 mL removed and sent to the lab.  All components of the time-out, debriefing, and handling of the specimen were conducted as per the ASAP Specimen Protocol. I certify that I have personally reviewed this examination and agree with Betsy Benitez's report. Elio Espinoza M.D.    X-RAY CHEST 1 VIEW    Result Date: 9/24/2024  IMPRESSION: Evaluation limited by positioning with the apices excluded on both views. Status post left thoracentesis without appreciable pneumothorax. Moderate residual left effusion with underlying atelectasis/consolidative opacity and small right effusion.  Assessment of the right chest is further limited by the patient's hand which overlies the hemithorax.     X-RAY CHEST 1 VIEW    Result Date: 9/24/2024  IMPRESSION: New moderate right effusion and worsening large left pleural effusion with increasing airspace disease in the left upper lobe and vascular congestion. Likely related to decompensated heart failure/CHF, with superimposed pneumonia not excluded in the appropriate setting.     CT HEAD WITHOUT IV CONTRAST    Result Date:  9/23/2024  IMPRESSION: No evidence of hemorrhage, mass or acute territorial infarction by CT criteria. COMMENT: Technique: Computed tomography of the brain was performed utilizing contiguous 2.5 mm transaxial sections without intravenous contrast administration. CT DOSE:  One or more dose reduction techniques (e.g. automated exposure control, adjustment of the mA and/or kV according to patient size, use of iterative reconstruction technique) utilized for this examination. Comparison studies: Head CT 7/28/2024. Postsurgical change: None. Brain parenchyma: There is no intraparenchymal hemorrhage, mass effect, midline shift or extra-axial fluid collection. White matter changes: Normal for age Ventricles, cisterns, and sulci: Normal in size and configuration. Sella: Normal in appearance. Cerebellar tonsils: Normal. Calvarium and extra cranial soft tissues: Normal. Paranasal sinuses: Clear bilaterally. Mastoid air cell: Normal. Orbits: Normal.      ECG/Telemetry  I have independently reviewed the telemetry. No events for the last 24 hours.       VTE Assessment: [place]    VTE Prophylaxis: Current anticoagulants:  enoxaparin (LOVENOX) syringe 30 mg, subcutaneous, Daily (6p)      Code Status: DNR (A.N.D.)  Estimated discharge date: 9/27/2024     ATTENDING DOCUMENTATION  ALSO SEE ATTENDING ATTESTATION SECTION OF NOTE

## 2024-09-25 NOTE — PROGRESS NOTES
Occupational Therapy -  Initial Evaluation     Patient: Samy Elena  Location: [place] [address]  MRN: [id number]  Today's date: 9/25/2024    HISTORY OF PRESENT ILLNESS     Samy is a 89 y.o. male admitted on 9/23/2024 with Shortness of breath [R06.02]  Pleural effusion [J90]  Acute decompensated heart failure (CMS/HCC) [I50.9]  Anemia, unspecified type [D64.9]  Chronic kidney disease, unspecified CKD stage [N18.9]  Acute on chronic congestive heart failure, unspecified heart failure type (CMS/HCC) [I50.9]. Principal problem is Acute decompensated heart failure (CMS/HCC).    Past Medical History  Samy has a past medical history of Aneurysm of internal iliac artery (CMS/[facility]), Atrial fibrillation (CMS/[facility]), CHF (congestive heart failure) (CMS/[facility]), 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[facility]), Coronary artery disease, Disease of thyroid gland, Will catheter in place, GI (gastrointestinal bleed), Hypertension, Myocardial infarction (CMS/[facility]), Orthostatic hypotension, and TIA (transient ischemic attack).    History of Present Illness   Presents by EMS for concern for fluid overload.     S/p US guided L thoracentesis with 900 mL removed 9/24  PRIOR LEVEL OF FUNCTION AND LIVING ENVIRONMENT     Prior Level of Function      Flowsheet Row Most Recent Value   Dominant Hand right   Ambulation assistive equipment and person   Transferring assistive equipment and person   Toileting assistive person   Bathing assistive equipment and person  [primarily sponge bathes]   Dressing assistive person   Eating independent   IADLs assistive person   Driving/Transportation friends/family   Prior Level of Function Comment pt has 24/7 HHA assists c ADLs, IALs, transfers, mostly uses w/c for mobility, occasional short distance amb with RW   Assistive Device Currently Used at Home wheelchair, ramps, walker, front-wheeled, walker, 4-wheeled, hospital bed             Prior Living Environment      Flowsheet Row Most Recent  "Value   People in Home other (see comments)   Current Living Arrangements home   Living Environment Comment pt has 24/7 HHA assists c ADLs, IALs, transfers, and amb c RW          Occupational Profile      Flowsheet Row Most Recent Value   Occupational History/Life Experiences retired professor          VITALS AND PAIN     OT Vitals      Date/Time Pulse HR Source SpO2 Pt Activity O2 Therapy O2 Del Method O2 Flow Rate BP BP Location BP Method Pt Position Observations [facility]   09/25/24 1134 71 Monitor 96 % At rest Supplemental oxygen Nasal cannula 2 L/min 84/47 Right upper arm Automatic Lying -- MM   09/25/24 1135 -- -- -- -- -- -- -- 98/51 Left upper arm Automatic Lying -- MM   09/25/24 1138 -- -- -- -- -- -- -- 81/47 Left upper arm Automatic Sitting seated EOB; asymptomatic MM   09/25/24 1140 65 Monitor 98 % At rest Supplemental oxygen Nasal cannula 2 L/min 81/47 Left upper arm Automatic Sitting post AROM B UE/LEs EOB; EOB MM          OT Pain      Date/Time Pain Type Rating: Rest Rating: Activity [facility]   09/25/24 1134 Pain Assessment 0 - no pain 0 - no pain MM             Objective   OBJECTIVE     Start time:  1133  End time:  1153  Session Length: 20 min  Mode of Treatment: co-treatment  Reason for co-treatment: cotx to maximize pt participation    General Observations  Patient received supine. He was agreeable to therapy, no issues or concerns identified by nurse prior to session. 2L O2 via NC; pt's son present at bedside    Precautions: fall, aspiration, oxygen therapy device and L/min       Limitations/Impairments: hearing, safety/cognitive      OT Eval and Treat - 09/25/24 1133          Cognition    Orientation Status oriented to;person;place;time;verbal cues/prompts needed for orientation   initially states \"May \" for month, self corrects to \"September\". use of white board for day; +year.    Affect/Mental Status WFL     Follows Commands follows one-step commands;verbal cues/prompting required;repetition of " directions required   repetition of commands d/t [place]    Cognitive Function WFL;executive function deficit     Executive Function Deficit insight/awareness of deficits     Comment, Executive Function decreased insight to BP drops and implications for safe mobility     Comment, Cognition Pt very pleasant, cooperative. motivated and eager to work with therapy. decreased insight to BP lability and implications for safe mobility- but receptive to edu, recommendations. expressing gratitiude towards therapists post session.        Vision Assessment/Intervention    Vision Assessment corrective lenses full-time        Hearing Assessment    Hearing Status hearing aid, bilateral;hearing impairment, bilaterally     Impact of Hearing Impairment on Functional Status requires occasional repetition for commands, increased voice volume        Sensory Assessment    Sensory Assessment sensation intact, upper extremities        Upper Extremity Assessment    General Observations mild stiffness B Shoulders, but demos at least 75% of full AROM        Upper Extremity Strength    Elbow, Left (Strength) 5/5     Hand, Left (Strength) 4/5     Elbow, Right (Strength) 5/5     Hand, Right (Strength) 4/5        Bed Mobility    Bed Mobility Activities supine to sit;sit to supine     [place] minimum assist (75% or more patient effort);1 person assist     Safety/Cues increased time to complete;minimal;verbal cues;sequencing;technique;hand placement     Assistive Device bed rails;draw sheet;head of bed elevated     Comment OOB to R with increased time for all transitional movements. some assist to bring LEs towards EOB and Robbi to bring trunk upright. Robbi and increased time to scoot forwards once EOB. denies dizziness/Lightheadedness once EOB, but with soft BP (SBP in 80s). engaged in B UE/UE AROM exercises with cl supervision. BP rechecked after > 3 minutes with contininued soft BP, but pt remains asymptomatic.        Sit/Stand Transfer     Surface edge of bed     [place] minimum assist (75% or more patient effort);1 person assist     Safety/Cues increased time to complete;minimal;verbal cues;hand placement;technique     Assistive Device walker, front-wheeled     Transfer Comments Robbi for steadying, lift assist from EOB. +Forward flexed posture and NBOS. able to take shuffling steps towards HOB with minAx2 and RW        Functional Mobility    Distance few steps at bedside     Functional Mobility [place] minimum assist (75% or more patient effort);2 person assist     Safety/Cues increased time to complete;minimal;verbal cues;sequencing;technique;proper use of assistive device;maintaining center of gravity over base of support     Assistive Device walker, front-wheeled     Functional Mobility Comments shuffling steps (x2-3) EOB towards HOB. +NBOS and forward flexed posture. assist/cues for managmenet of RW        Lower Body Dressing    Tasks socks     [place] dependent (less than 25% patient effort)     Comment adjustment of socks EOB; limited by decreased fxl reach, balance        Grooming    Comment offered; politely declined        Toileting    Tasks adjust/manage clothing;perform bladder hygiene     [place] maximum assist (25-49% patient effort)     Comment denies toielting needs; +incontinent of urine at bed level requiring maxA for hygiene/linen change in stnading.        Balance    Static Sitting Balance WFL;sitting, edge of bed     Dynamic Sitting Balance mild impairment;unsupported;sitting, edge of bed     Sit to Stand Dynamic Balance mild impairment;supported     Static Standing Balance mild impairment;supported     Dynamic Standing Balance mild impairment;supported     Balance Interventions occupation based/functional task     Comment, Balance cl supervision EOB; minAx1-2 with RW in standing, with steps        Impairments/Safety Issues    Impairments Affecting Function balance;strength;endurance/activity tolerance      Functional Endurance appears mildly below baseline                                    Education Documentation  Self-Care, taught by Michelle Santiago OT at 9/25/2024  1:52 PM.  Learner: Family, Patient  Readiness: Acceptance  Method: Explanation  Response: Verbalizes Understanding  Comment: role of OT, safe mobility, ADLs, discharge recommendations        Session Outcome  Patient reclined, in bed at end of session, bed alarm on, all needs met, call light in reach, personal items in reach. Nursing notified about patient's performance, patient's position, patient's response to therapy/activity, and change in vital signs.    AM-PAC™ - ADL (Current Function)     Putting on/taking off regular lower body clothing 1 - Total   Bathing 2 - A Lot   Toileting 2 - A Lot   Putting on/taking off regular upper body clothing 3 - A Little   Help for taking care of personal grooming 3 - A Little   Eating meals 3 - A Little   AM-PAC™ ADL Score 14      ASSESSMENT AND PLAN     Progress Summary  OT eval completed. Pt presents near baseline with mild deficits in strength, endurance, balance and with soft BP with positional changes. Pt is asymptomatic with BP drops, but deferred further mobility OOB aside from sit<>stands and steps at bedside. Ax1 for bed mobility and sit<>stands. REC continued OT in acute setting and discharge home with continued 24/7 CG assist and HHOT once medically stable. Pt is motivated to work with therapies in acute setting.    Patient/Family Therapy Goal Statement: to get stronger, get continued therapies    OT Plan      Flowsheet Row Most Recent Value   Rehab Potential good, to achieve stated therapy goals at 09/25/2024 1133   Therapy Frequency 3 times/wk at 09/25/2024 1133   Planned Therapy Interventions activity tolerance training, occupation/activity based interventions, patient/caregiver education/training, strengthening exercise, BADL retraining, functional balance retraining, transfer/mobility retraining at  09/25/2024 1133            OT Discharge Recommendations      Flowsheet Row Most Recent Value   OT Recommended Discharge Disposition home with home health, home with assistance at 09/25/2024 1133   Anticipated Equipment Needs if Discharged Home (OT) none  [has all recommended DME] at 09/25/2024 1133                 OT Goals      Flowsheet Row Most Recent Value   Transfer Goal 1    Activity/Assistive Device sit-to-stand/stand-to-sit, bed-to-chair/chair-to-bed, commode, 3-in-1, walker, front-wheeled, wheelchair transfer at 09/25/2024 1133   [place] minimum assist (75% or more patient effort) at 09/25/2024 1133   Time Frame short-term goal (STG) at 09/25/2024 1133   Progress/Outcome goal ongoing at 09/25/2024 1133   Toileting Goal 1    Activity/Assistive Device toileting skills, all at 09/25/2024 1133   [place] moderate assist (50-74% patient effort) at 09/25/2024 1133   Time Frame short-term goal (STG) at 09/25/2024 1133   Progress/Outcome goal ongoing at 09/25/2024 1133   Grooming Goal 1    Activity/Assistive Device grooming skills, all at 09/25/2024 1133   [place] set-up required at 09/25/2024 1133   Time Frame by discharge at 09/25/2024 1133   Strategies/Barriers seated at 09/25/2024 1133   Progress/Outcome goal ongoing at 09/25/2024 1133

## 2024-09-25 NOTE — HOSPITAL COURSE
Samy is a 89 y.o. male admitted on 9/23/2024 with Shortness of breath [R06.02]  Pleural effusion [J90]  Acute decompensated heart failure (CMS/HCC) [I50.9]  Anemia, unspecified type [D64.9]  Chronic kidney disease, unspecified CKD stage [N18.9]  Acute on chronic congestive heart failure, unspecified heart failure type (CMS/HCC) [I50.9]. Principal problem is Acute decompensated heart failure (CMS/HCC).    Past Medical History  Samy has a past medical history of Aneurysm of internal iliac artery (CMS/[facility]), Atrial fibrillation (CMS/[facility]), CHF (congestive heart failure) (CMS/[facility]), 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[facility]), Coronary artery disease, Disease of thyroid gland, Will catheter in place, GI (gastrointestinal bleed), Hypertension, Myocardial infarction (CMS/[facility]), Orthostatic hypotension, and TIA (transient ischemic attack).    History of Present Illness   Presents by EMS for concern for fluid overload.     S/p US guided L thoracentesis with 900 mL removed 9/24

## 2024-09-25 NOTE — PLAN OF CARE
Plan of Care Review  Plan of Care Reviewed With: patient  Progress: no change  Outcome Evaluation: Patient alert and oriented, expressing frustration regarding hospital stay. Colostomy bag emptied by staff. Incontinence care provided. Call bell within reach.

## 2024-09-25 NOTE — PLAN OF CARE
Problem: Adult Inpatient Plan of Care  Goal: Plan of Care Review  Outcome: Progressing  Flowsheets (Taken 9/25/2024 7030)  Progress: improving  Outcome Evaluation: OT eval completed. REC discharge home with EDUARDA MELCHOR  Plan of Care Reviewed With:   patient   child     Problem: Self-Care Deficit  Goal: Improved Ability to Complete Activities of Daily Living  Outcome: Progressing     Problem: Self-Care Deficit  Goal: Improved Ability to Complete Activities of Daily Living  Outcome: Progressing

## 2024-09-25 NOTE — CONSULTS
Nutrition Assessment    Recommendations  SB6 per SLP, 2 gm sodium diet  Daily weights   CHF diet education- brief one        -past visits with CHF, attached handouts to AVS  Daily renal MVI  Nepro Mixed Berry BID           Clinical Course: Patient is a 89 y.o. male who was admitted on 9/23/2024 with a diagnosis of Shortness of breath [R06.02]  Pleural effusion [J90]  Acute decompensated heart failure (CMS/HCC) [I50.9]  Anemia, unspecified type [D64.9]  Chronic kidney disease, unspecified CKD stage [N18.9]  Acute on chronic congestive heart failure, unspecified heart failure type (CMS/HCC) [I50.9].     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    Atrial fibrillation (CMS/HCC)     CHF (congestive heart failure) (CMS/HCC)     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[facility])     Coronary artery disease     Disease of thyroid gland     Will catheter in place     6/25 not at present    GI (gastrointestinal bleed)     Hypertension     Myocardial infarction (CMS/[facility])     Orthostatic hypotension     TIA (transient ischemic attack)      Past Surgical History   Procedure Laterality Date    cataract extraction right eye with implant and limbal relaxing incision Right 7/18/2024    Performed by Hayder Moses MD at  OR [facility]    Cataract extraction w/ intraocular lens implant Left     Cataract extraction with LRI and anterior vitrectomy left eye Left 11/16/2023    Performed by Hayder Moses MD at  OR [facility]    Cholecystectomy      Colostomy      COLOSTOMY LAPAROSCOPIC N/A 8/3/2022    Performed by Mat Bryant MD at [facility] [place]    Coronary artery bypass graft      INCISION AND DRAINAGE WITH DEBRIDMENT OF BUTTOCK, AND PERINEUM N/A 8/2/2022    Performed by Mat Bryant MD at [facility] [place]    Joint replacement Left     Knee    RIGHT HEART CATH N/A 8/4/2022    Performed by Cristal Lacey MD at [facility] CARDIAC CATH/EP    Thyroidectomy      WASHOUT OF PERINEAL WOUND, COLONIC LAVAGE N/A 8/3/2022     "[place]formed [place] [facility] [place]       Reason for Assessment  Reason For Assessment: physician consult (Cx- CHF diet teaching)     [facility] Nutrition Screen Tool  Has patient lost weight without trying?: 0-->No  Has patient been eating poorly due to decreased appetite?: 0-->No  [facility] Nutrition Screen Score: 0     Nutrition/Diet History  Intake (%): 75%     Physical Findings  Last Bowel Movement: 09/24/24  Skin: intact     RETS18 Physical Appearance  Last Bowel Movement: 09/24/24  Skin: intact     Nutrition Order  Nutrition Order: meets nutritional requirements  Nutrition Order Comments: SB6, 2 gm sodium     Anthropometrics  Height: 185.4 cm (6' 1\")           Current Weight  Weight Method: Bed scale  Weight: 72.6 kg (160 lb 0.9 oz)     Ideal Body Weight (IBW)  Ideal Body Weight (IBW) (kg): 84.86  % Ideal Body Weight: 86.38            Body Mass Index (BMI)  BMI (Calculated): 21.1  BMI Assessment: BMI 40 or greater: obesity grade III       Labs/Procedures/Meds  Lab Results Reviewed: reviewed     RETS18 Lab Results  Lab Results Reviewed: reviewed   BMP Results         09/25/24 09/24/24 09/24/24     0527 2141 0741     141 143    K 4.4 3.8 3.7    Cl 107 104 107    CO2 28 29 28    Glucose 92 124 106    BUN 35 35 34    Creatinine 1.7 1.7 1.7    Calcium 8.4 8.5 8.6    Anion Gap 9 8 8    EGFR 37.4 37.4 37.4           Comment for K at 0741 on 09/24/24: Results obtained on plasma. Plasma Potassium values may be up to 0.4 mEQ/L less than serum values. The differences may be greater for patients with high platelet or white cell counts.    Comment for EGFR at 0527 on 09/25/24: Calculation based on the Chronic Kidney Disease Epidemiology Collaboration (CKD-EPI) equation refit without adjustment for race.    Comment for EGFR at 2141 on 09/24/24: Calculation based on the Chronic Kidney Disease Epidemiology Collaboration (CKD-EPI) equation refit without adjustment for race.    Comment for EGFR at 0741 on 09/24/24: Calculation " based on the Chronic Kidney Disease Epidemiology Collaboration (CKD-EPI) equation refit without adjustment for race.           Medications  Pertinent Medications Reviewed: reviewed    clopidogreL  75 mg oral q AM    enoxaparin  30 mg subcutaneous Daily (6p)    escitalopram  5 mg oral q AM    [Provider Managed Hold] furosemide  40 mg intravenous Daily    levothyroxine  100 mcg oral Daily (6:30a)    metoprolol succinate XL  25 mg oral Daily with dinner    tamsulosin  0.4 mg oral Daily            Calorie Requirements  Estimated kCal Needs: Actual Body Weight (72.6 kg)  Estimated Calorie Need Method: kcal/kg  Calorie/kg Recommended: 25-30  Calorie Recommendations: 5813-0847 kcal       Protein Requirements  Recommended Dosing Weight (Estimated Protein Needs): Actual Body Weight  Est Protein Requirement Amount (gms/kg): 1.1-->1.1 gm protein  Protein Recommendations: -75 g         Fluid requirements  -2000 mL or per FR per MD/Nephro    Dosing weight CBW 72.6 kg        Skin:  Intact    NFPE: Mild temporal clavicle muscle wasting. No wt loss report other than fluid shift with CHF, PO is 75%.  Does not meet malnutrition criteria with information we have currently.     Clinical comments: Cx is acknowledged. Pt is seen for CHF diet teaching. Has hx of CHF(acute on chronic HF - mildly reduced EF 40-45%) , Stg 4 CKD, dysphagia. Has colostomy bag. On SB6 diet texture per SLP. Known to nutrition from past visits with CHF. Briefly went over low-sodium meals, daily weights, FR. Pt listened, no comments, no questions. PO intake 75%. Offered ONS. Said likes berry flavored drinks from past hospital stays. Ordered Nepro Mixed Berry BID. Denies any GI issues, LBM 9/24 per nsg. On Lasix.   US guided left thoracentesis with 900 mL removed on 9/24.     Goals: PO to meet >75% of needs  Monitor: PO, appetite, weight, skin, labs    Recommendations: See above     PES  Statement: PES Statement  Nutrition Diagnosis: Swallowing Difficulty  Related  To:: dysphagia  As Evidenced By:: dysphaia dx, SB6 diet  Nutritional Needs Met?: Yes                                   Date: 09/25/24  Signature: Jazmyn Palomares RD

## 2024-09-26 VITALS
DIASTOLIC BLOOD PRESSURE: 55 MMHG | WEIGHT: 158 LBS | HEIGHT: 73 IN | HEART RATE: 68 BPM | OXYGEN SATURATION: 93 % | SYSTOLIC BLOOD PRESSURE: 88 MMHG | RESPIRATION RATE: 17 BRPM | BODY MASS INDEX: 20.94 KG/M2 | TEMPERATURE: 97.4 F

## 2024-09-26 LAB
ANION GAP SERPL CALC-SCNC: 10 MEQ/L (ref 3–15)
BUN SERPL-MCNC: 35 MG/DL (ref 7–25)
CALCIUM SERPL-MCNC: 8.4 MG/DL (ref 8.6–10.3)
CHLORIDE SERPL-SCNC: 104 MEQ/L (ref 98–107)
CO2 SERPL-SCNC: 27 MEQ/L (ref 21–31)
CREAT SERPL-MCNC: 1.7 MG/DL (ref 0.7–1.3)
EGFRCR SERPLBLD CKD-EPI 2021: 37.4 ML/MIN/1.73M*2
ERYTHROCYTE [DISTWIDTH] IN BLOOD BY AUTOMATED COUNT: 19.1 % (ref 11.6–14.4)
GLUCOSE SERPL-MCNC: 148 MG/DL (ref 70–99)
HCT VFR BLD AUTO: 30.7 % (ref 40.1–51)
HGB BLD-MCNC: 9.7 G/DL (ref 13.7–17.5)
MAGNESIUM SERPL-MCNC: 2.1 MG/DL (ref 1.8–2.5)
MCH RBC QN AUTO: 32.4 PG (ref 28–33.2)
MCHC RBC AUTO-ENTMCNC: 31.6 G/DL (ref 32.2–36.5)
MCV RBC AUTO: 102.7 FL (ref 83–98)
MICROORGANISM SPEC CULT: NORMAL
PDW BLD AUTO: 11 FL (ref 9.4–12.4)
PLATELET # BLD AUTO: 115 K/UL (ref 150–350)
POTASSIUM SERPL-SCNC: 3.7 MEQ/L (ref 3.5–5.1)
RBC # BLD AUTO: 2.99 M/UL (ref 4.5–5.8)
SODIUM SERPL-SCNC: 141 MEQ/L (ref 136–145)
WBC # BLD AUTO: 5.86 K/UL (ref 3.8–10.5)

## 2024-09-26 PROCEDURE — 63700000 HC SELF-ADMINISTRABLE DRUG

## 2024-09-26 PROCEDURE — 83735 ASSAY OF MAGNESIUM: CPT

## 2024-09-26 PROCEDURE — 99239 HOSP IP/OBS DSCHRG MGMT >30: CPT | Performed by: HOSPITALIST

## 2024-09-26 PROCEDURE — 36415 COLL VENOUS BLD VENIPUNCTURE: CPT

## 2024-09-26 PROCEDURE — 80048 BASIC METABOLIC PNL TOTAL CA: CPT

## 2024-09-26 PROCEDURE — 85027 COMPLETE CBC AUTOMATED: CPT

## 2024-09-26 RX ORDER — LIDOCAINE 560 MG/1
1 PATCH PERCUTANEOUS; TOPICAL; TRANSDERMAL DAILY
Status: DISCONTINUED | OUTPATIENT
Start: 2024-09-26 | End: 2024-09-26 | Stop reason: HOSPADM

## 2024-09-26 RX ADMIN — LIDOCAINE 4% 1 PATCH: 40 PATCH TOPICAL at 09:21

## 2024-09-26 RX ADMIN — ACETAMINOPHEN 650 MG: 325 TABLET ORAL at 08:09

## 2024-09-26 RX ADMIN — TORSEMIDE 10 MG: 20 TABLET ORAL at 09:00

## 2024-09-26 RX ADMIN — LEVOTHYROXINE SODIUM 100 MCG: 0.1 TABLET ORAL at 05:12

## 2024-09-26 RX ADMIN — TAMSULOSIN HYDROCHLORIDE 0.4 MG: 0.4 CAPSULE ORAL at 08:03

## 2024-09-26 RX ADMIN — CLOPIDOGREL 75 MG: 75 TABLET ORAL at 08:03

## 2024-09-26 RX ADMIN — ESCITALOPRAM OXALATE 5 MG: 5 TABLET, FILM COATED ORAL at 08:03

## 2024-09-26 ASSESSMENT — COGNITIVE AND FUNCTIONAL STATUS - GENERAL
MOVING TO AND FROM BED TO CHAIR: 2 - A LOT
WALKING IN HOSPITAL ROOM: 2 - A LOT
STANDING UP FROM CHAIR USING ARMS: 2 - A LOT
CLIMB 3 TO 5 STEPS WITH RAILING: 2 - A LOT

## 2024-09-26 NOTE — PLAN OF CARE
Care Coordination Discharge Plan Summary    Admission Assessment Summary    General Information  Readmission Within the last 30 days: no previous admission in last 30 days  Does patient have a :    Patient-Specific Goals (include timeframe): To be less SOB by discharge    Living Arrangements  Arrived From: home  Current Living Arrangements: home  People in Home: other (see comments) (24/7 paid CG)  Home Accessibility:    Living Arrangement Comments:      Social Drivers of Health - Screenings  Housing Stability  In the last 12 months, was there a time when you were not able to pay the mortgage or rent on time?: No  In the past 12 months, how many times have you moved where you were living?: 0  At any time in the past 12 months, were you homeless or living in a shelter (including now)?: No  Utility Access  In the past 12 months has the electric, gas, oil, or water company threatened to shut off services in your home?: No  Transportation Needs  In the past 12 months, has lack of transportation kept you from medical appointments or from getting medications?: No  In the past 12 months, has lack of transportation kept you from meetings, work, or from getting things needed for daily living?: No    Functional Status Prior to Admission  Assistive Device/Animal Currently Used at Home: wheelchair, ramps, walker, front-wheeled, walker, 4-wheeled, hospital bed  Functional Status Comments: Requires assistance with a walker, and uses a WC as needed  IADL Comments:      Discharge Needs Assessment    Concerns to be Addressed: care coordination/care conferences, discharge planning  Current Discharge Risk: chronically ill  Anticipated Changes Related to Illness: none    Discharge Plan Summary    Patient Choice  Offered/Gave Vendor List: yes  Patient's Choice of Community Agency(s): [facility]  Patient and/or patient guardian/advocate was made aware of their right to choose a provider. A list of eligible providers was  presented and reviewed with the patient and/or patient guardian/advocate in written and/or verbal form. The list delineates providers in the patient’s desired geographic area who are participating in the Medicare program and/or providers contracted with the patient’s primary insurance. The Medicare list and quality ratings were obtained from the Medicare.gov [medicare.gov] website.    Concerns / Comments: Per DPR, discharge planned for today, home with [facility], liaison updated. Spoke to son, Marko, he is  agreeable to the discharge plan. The patient's niece will provid transportation home. CC will continue to follow for transition of care needs until discharge is complete.    Discharge Plan:  Disposition/Destination: Home Health Care - [facility] / Home       Connection to Community     Community Resources:      Discharge Transportation:  Is Out of Hospital DNR needed at Discharge: no  Does patient need discharge transport? No

## 2024-09-26 NOTE — PROGRESS NOTES
09/26/24 1421   Hospital to Home   Patient Enrolled in [facility]? Yes   [facility] Coordinator Comment Spoke w/ pt's son (Marko) to discuss Hospital to Home program. He is agreeable to a phone visit. Telemed appt. with [facility] provider scheduled for Tuesday, Oct. 1, 2024 at 2pm. He declined assistance making other follow-up appt's.

## 2024-09-26 NOTE — NURSING NOTE
Discharge instructions reviewed w/ pt and pt's granddaughter. All questions answered. IV and tele removed. All belongings w/ pt.

## 2024-09-26 NOTE — DISCHARGE INSTRUCTIONS
Mr. VERONICA SPRINGER,    You were admitted to [facility] on 9/23/2024 for back pain and shortness of breath.  Her chest x-ray on admission showed large amount of fluid surrounding your left lung.  You underwent a thoracentesis, procedure where this excess fluid is removed.  You received 2 days of IV diuretic.  Your breathing and symptoms improved with these interventions.  Prior to discharge, you were restarted on your home medications including your home diuretic.  You were not continued on magnesium during this admission and your magnesium level remained appropriate.  We will discontinue this and you can repeat lab work in a week with your PCP to consider restarting this medication.  If you continue to have recurrence of this fluid surrounding your left lung, it may be appropriate to consider long-term interventions that may help drain this fluid without requiring you to continue coming to the hospital.    Follow up:  - Follow up with your primary care physician (PCP) in 1-2 weeks.  Should repeat a BMP/magnesium/CBC in 1 week.  - Follow-up with pulmonology to monitor this chronic pleural effusion    Please review all discharge instructions as discussed.  Please be re-evaluated by your primary doctor as soon as possible and be sure to review all laboratory, radiology and other testing with your doctor.  Should your symptoms worsen or not improve, return to emergency room immediately.    We wish you the best of health!

## 2024-09-27 ENCOUNTER — PATIENT OUTREACH (OUTPATIENT)
Dept: CASE MANAGEMENT | Facility: CLINIC | Age: 89
End: 2024-09-27
Payer: MEDICARE

## 2024-09-27 DIAGNOSIS — E83.42 HYPOMAGNESEMIA: Primary | ICD-10-CM

## 2024-09-27 DIAGNOSIS — I50.43 ACUTE ON CHRONIC COMBINED SYSTOLIC AND DIASTOLIC CONGESTIVE HEART FAILURE (CMS/HCC): ICD-10-CM

## 2024-09-27 DIAGNOSIS — D63.1 ANEMIA DUE TO STAGE 3 CHRONIC KIDNEY DISEASE, UNSPECIFIED WHETHER STAGE 3A OR 3B CKD (CMS/HCC): ICD-10-CM

## 2024-09-27 DIAGNOSIS — N18.30 ANEMIA DUE TO STAGE 3 CHRONIC KIDNEY DISEASE, UNSPECIFIED WHETHER STAGE 3A OR 3B CKD (CMS/HCC): ICD-10-CM

## 2024-09-27 ASSESSMENT — ENCOUNTER SYMPTOMS
NAUSEA: 0
DIARRHEA: 0
DIZZINESS: 0
ACTIVITY CHANGE: 1
LIGHT-HEADEDNESS: 0
SHORTNESS OF BREATH: 0
VOMITING: 0

## 2024-09-27 NOTE — PROGRESS NOTES
"  NAME: Samy Elena    MRN: [id number]    YOB: 1930    Event Review:    Initial TCM Patient Outreach Date: 09/27/24    Assessment completed with: Family Member (MARKO-SON)  Patient stated reason for hospitalization: SOB, PAIN IN BACK.  Discharge Diagnosis: ACUTE DECOMPENSATED HF    Patient readmitted in the last 30 days: No  Discharging Facility[facility]  Date of Last Admission: 09/24/24  Date of Last Discharge: 09/26/24               Patient's perception of their health status since discharge: Improving    HPI:Spoke with patient's son Marko today on number: [phone]  Pt presented to [facility] on 9/24/24 with back pain and SOB.   PMH: A-fib, CHF (EF 45-50%), CAD, GI bleed, hypertension, TIA, MI     \"Prior to admission, patient reported worsening back pain as well as shortness of breath and associated abdominal distention. Also reported several episodes of nausea with emesis. Denied any fever, chills reported a chronic cough. Patient reported medication compliance. He has been admitted numerous times for acute on chronic CHF exacerbation and was admitted 7/28 to 8/2 for altered mental status 2/2 pneumonia/UTI. \"  Pt improved and was discharged to home with services on 9/26/24.      Marko states pt is improved since coming home. Pt lives in [address] home with [facility] and paid caregiver 24/7. Pt ambulates with walker. Pt uses a wheel chair as well. Other DME in home: hospital bed and ramps. Pt needs assistance with ADL's.    Marko barron pt with no complaints chest pain, shortness of breath or dizziness on call.  Marko ramirez pt denies N/V.  Pt has colostomy. Marko barron pt denies swelling in extremities. Marko barron pt does endorse back pain and pt takes Tramadol PRN.   Pt follows a cardiac diet and FR.   Marko states pt had thoracentesis which 900 cc of fluid was drained.   Marko made aware of blood work needed by 1 week: CBC, Magnesium and BMP.  [facility] sent secure chat to Letitia GORDON " to enter labs and request draw by [facility] when visiting in home.   Family/Caregiver equipped to manage care.     [facility] will provide nursing services to patient. Son has been contacted and services will start 9/30/24.     Discharge instructions reviewed with son, son verbalizes understanding. Reviewed general recommendations regarding fall safety, cardiac diet, FR.   Son will schedule a follow-up with PCP. PCP visits in home. Last visit was 2 weeks ago.   Relevant Specialist Follow-ups:   DR. AMOS, PULMONOLOGY--[facility].   DR. DUFFY, SURGERON 2/20/25.      ACM explained hospital to home program and reminded patient of the upcoming   appointment with HEIDI--  MICHELLE GORDON 10/1/24 @ 2 PM.    ACM to follow-up in 1 week on/around 10/4/24     Review of Systems   Constitutional:  Positive for activity change.   Respiratory:  Negative for shortness of breath.    Cardiovascular:  Negative for chest pain and leg swelling.   Gastrointestinal:  Negative for diarrhea, nausea and vomiting.   Musculoskeletal:  Positive for gait problem (WALKER OR WHEELCHAIR).   Neurological:  Negative for dizziness and light-headedness.       Medication Review:  Medication Review: No (NO CHANGES, REVIEWED STOPPED MEDICATION)     Reported by: Child  Any new medications prescribed at discharge?: No      Reviewed AVS (Discharge Instructions)?: Yes         Acute Pain:  Acute pain: No                Chronic Pain:  Chronic pain: Yes        Chronic pain timing: intermittent  Location of chronic pain: BACK--TAKES TRAMADOL PRN    Diet/Nutrition:  Type of Diet: Cardiac 2mg sodium, Low sodium, Fluid Restricted  Diet Adherence: Adherent with diet          Home Care Services:  Home Care Agency: [facility] (CONTACTED JOSE-SON, VISITING MONDAY 9/30/24)  Type of Home Care Services: Home Nursing  Home Care Interventions: Accepted post-discharge     Post-Discharge Durable Medical Equipment::  Durable Medical Equipment: Manual wheelchair, Front wheeled walker,  Hospital bed (RAMPS)  Does patient's condition require a scale?: Yes  Working scale at home?: Yes  Oxygen Use: No   DME Interventions: No intervention required    Home Management:  Living Arrangement: Alone, Paid Caregiver (PT HAS PAIN CAREGIVER--BECKIE 24/7.)  Support System:: Paid Caregiver  Type of Residence: [address] [place] house  Home Monitoring: Weight, Blood Pressure, Pulse Oximetry (NO READINGS ON CALL. DC WEIGHT 158 LBS)  Any patient reported falls in the last 3 months?: Yes  Cheondoism or spiritual beliefs that impact treatment?: No    Appointment Scheduling:  PCP appointment scheduled: No (DR. ORTIZ)              Patient Scheduling Dispositions: Patient requested family/caregiver to schedule their appointment     Follow-Ups:   Relevant Labs & Tests: BMP, MAGNESIUM, CBC--1 WEEK. NEEDS LAB IN EPIC CHART AND FAMILY WOULD LIKE BLOOD WORK DRAWN IN HOME.  Relevant Specialist Follow-ups: DR. AMOS, PULMONOLOGY--[facility] [place]. DR. DUFFY, SURGERON 2/20/25. MICHELLE GORDON 10/1/24 @ 2 PM.    Interventions/ Care Coordination:  Interventions/ Care Coordination: Encouraged patient to call PCP/Specialist  General Education: Respiratory precautions (flu, colds, pneumonia, RSV, COVID-19)  Disease Specific Education: CHF  Initiated Care Plan: CHF          Reviewed signs/symptoms of worsening condition or complication that necessitate a call to the Physician's office.  Educated patient on access to care.  RN phone number given for future care management.    Pilar Lockhart RN

## 2024-09-28 LAB
GRAM STN SPEC: NORMAL
GRAM STN SPEC: NORMAL
MICROORGANISM SPEC CULT: NORMAL

## 2024-09-29 NOTE — ED ATTESTATION NOTE
The patient was evaluated and managed by the physician assistant / nurse practitioner.       Marcello Trinh MD  09/29/24 1546

## 2024-10-01 ENCOUNTER — TELEMEDICINE (OUTPATIENT)
Dept: HOSPITALIST | Facility: CLINIC | Age: 89
End: 2024-10-01
Payer: MEDICARE

## 2024-10-01 DIAGNOSIS — I50.813 ACUTE ON CHRONIC RIGHT-SIDED CONGESTIVE HEART FAILURE (CMS/HCC): Primary | ICD-10-CM

## 2024-10-01 DIAGNOSIS — I36.1 NONRHEUMATIC TRICUSPID VALVE REGURGITATION: ICD-10-CM

## 2024-10-01 NOTE — PROGRESS NOTES
Hospital to Home Program Visit       Hospital to Home Program Visit:  Audio Only Encounter     DISCHARGE INFORMATION   Assessment completed with: Family Member (son Marko [phone])  Discharge Diagnosis: CHF  Discharging Facility: [facility]  Date of Last Discharge: 09/26/24  Discharge Disposition: Home  Patient's perception of their health status since discharge: Same    PCP: Zachery Hernandez MD       HPI / OVERVIEW OF VISIT      Samy Elena is a frail, chronically ill  89 y.o. male with a PMH of CAD, CKD IV, ostomy, Afib, CHF, massive TR, dysphagia on comfort feeds and ostomy. He has had frequent hospitalizations for decompensated CHF from known valvular disease. Hospitalized again 9/23- 9/26 after he presented with SOB and back pain. CXR showed recurrent L pleural effusion for which he underwent L thoracentesis with 900 cc removed. Cardio consulted. Echo showed EF 40-45% with severe TR. Patient had appropriate response to diuresis. May need to consider pleurex cath for effusions. DC to home with close OP follow up.        A tele health visit was completed today with kaykay Zhu ([phone]) providing the bulk of the information. Patient has remained stable since his return to home. No current complaints of SOB, SOLIS or back pain. He is essentially wheelchair bound at baseline. Stressed need for low sodium/2L FR diet. Continue daily weights. Weight unchanged since DC- 158 lbs. Would admin additional dose of Torsemide and contact Dr Jeffrey if weight reaches 161 lbs OR at the onset of any SOB. Discussed how patient is at high risk for readmission given his advanced valvular disease.        Attempted to review DC MAR but patient did not bring MAR home. They are aware of the need for follow up with Pulm and Dr Kyle number provided. No need for med refills. I added BMP and CBC, requested [facility] draw.     Last set of VS from 9/30-    T 97.2   HR 76   RR 16   104/58   158 lbs    HIGH  RISK FOR READMISSION , patient would benefit from continued C conversations           Overall, how was your care that was provided and do you have any suggestions for improvement?  Satisfied by the care provided at [facility] [place]- 9/26.     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[facility])    Hypertension    Atrial fibrillation (CMS/[facility])    Elevated troponin    Orthostatic hypotension    Hyperlipidemia    Hypothyroidism    Depression    CKD (chronic kidney disease) stage 4, GFR 15-29 ml/min (CMS/[facility])    Pressure ulcer    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[facility])    Multiple medical problems    History of recurrent UTIs    BMI 21.0-21.9, adult    GERD (gastroesophageal reflux disease)    Preop examination    At risk for obstructive sleep apnea    Anemia due to chronic kidney disease    History of chronic CHF    Thrombocytopenia (CMS/[facility])    Glaucoma    Age-related cataract of right eye    Encephalopathy    HFrEF (heart failure with reduced ejection fraction) (CMS/HCC)    History of recurrent UTIs    Shortness of breath    A-fib (CMS/[facility])    Hypertension    CALI (acute kidney injury) (CMS/HCC)    Acute decompensated heart failure (CMS/HCC)    Dysphagia         MEDICATIONS      Reconciled the current and discharge medications: Yes      Current Outpatient Medications:     bimatoprost (LUMIGAN) 0.01 % ophthalmic drops, Administer 1 drop into the left eye nightly., Disp: , Rfl:     calcium carbonate (TUMS) 200 mg calcium (500 mg) chewable tablet, Take 1 tablet by mouth 2 (two) times a day., Disp: , Rfl:     carboxymethylcellulose (REFRESH PLUS) 0.5 % dropperette, Administer 1 drop into both eyes 4 (four) times a day as needed for dry  eyes., Disp: , Rfl:     clopidogreL (PLAVIX) 75 mg tablet, Take 75 mg by mouth every morning., Disp: , Rfl:     cyanocobalamin (VITAMIN B12) 500 mcg tablet, Take 500 mcg by mouth every morning., Disp: , Rfl:     escitalopram (LEXAPRO) 5 mg tablet, Take 5 mg by mouth every morning., Disp: , Rfl:     famotidine (PEPCID) 10 mg tablet, Take 1 tablet (10 mg total) by mouth daily Indications: gastroesophageal reflux disease., Disp: 30 tablet, Rfl: 0    finasteride (PROSCAR) 5 mg tablet, Take 1 tablet (5 mg total) by mouth daily., Disp: 30 tablet, Rfl: 0    levothyroxine (SYNTHROID) 100 mcg tablet, Take 100 mcg by mouth daily., Disp: , Rfl:     lidocaine (ASPERCREME) 4 % adhesive patch,medicated topical patch, Apply 1 patch topically daily as needed. Remove & discard patch within 12 hours or as directed by prescriber., Disp: , Rfl:     melatonin 3 mg tablet, Take 3 mg by mouth nightly., Disp: , Rfl:     methenamine (HIPREX) 1 gram tablet, Take 1 g by mouth daily before lunch., Disp: , Rfl:     metoprolol succinate XL (TOPROL-XL) 25 mg 24 hr tablet, Take 12.5 mg by mouth daily with dinner., Disp: , Rfl:     omeprazole (PriLOSEC) 20 mg capsule, Take 20 mg by mouth daily with lunch., Disp: , Rfl:     potassium chloride (MICRO-K) 10 mEq CR capsule, Take 10 mEq by mouth 2 (two) times a day., Disp: , Rfl:     tamsulosin (FLOMAX) 0.4 mg capsule, Take 1 capsule (0.4 mg total) by mouth daily., Disp: 30 capsule, Rfl: 0    thiamine 50 mg tablet, Take 50 mg by mouth daily., Disp: , Rfl:     tobramycin (TOBREX) 0.3 % ophthalmic solution, Administer 1 drop into the right eye 3 (three) times a day., Disp: , Rfl:     torsemide (DEMADEX) 10 mg tablet, Take 10 mg by mouth daily., Disp: , Rfl:     I am having Samy JUAN Elena maintain his clopidogreL, calcium (as carbonate), cyanocobalamin, melatonin, thiamine, LUMIGAN, famotidine, escitalopram, methenamine, lidocaine, omeprazole, tobramycin, metoprolol succinate XL, levothyroxine,  finasteride, tamsulosin, potassium chloride, torsemide, and carboxymethylcellulose.    REVIEW OF SYSTEMS      All other systems reviewed and negative except as noted in HPI.      POST DISCHARGE MEDICAL EQUIPMENT      DME Interventions: No intervention required  Oxygen Use: No          FOLLOW-UP APPOINTMENTS      Appointment Provider: Needs follow up with PCP, Pulm and Cardio       INTERVENTIONS   Interventions needed: communicated with another interdisciplinary provider, updated medication listing, ordered labs            PATIENT INSTRUCTIONS      Patient Instructions   Continue meds as directed  Daily weights, DC weight 158 lbs  Contact Cardio and take an additional dose of Torsemide if weight reaches 161 lbs OR at the onset of any worsening SOB or edema  [facility] following   Follow up with Cardio and PCP  Discuss possible pleurex cath with Pulm, need to establish care           HEIDI Stone  10/1/2024

## 2024-10-01 NOTE — PATIENT INSTRUCTIONS
Continue meds as directed  Daily weights, DC weight 158 lbs  Contact Cardio and take an additional dose of Torsemide if weight reaches 161 lbs OR at the onset of any worsening SOB or edema  [facility] following   Follow up with Cardio and PCP  Discuss possible pleurex cath with Pulm, need to establish care   Staged Advancement Flap Text: The defect edges were debeveled with a #15 scalpel blade.  Given the location of the defect, shape of the defect and the proximity to free margins a staged advancement flap was deemed most appropriate.  Using a sterile surgical marker, an appropriate advancement flap was drawn incorporating the defect and placing the expected incisions within the relaxed skin tension lines where possible. The area thus outlined was incised deep to adipose tissue with a #15 scalpel blade.  The skin margins were undermined to an appropriate distance in all directions utilizing iris scissors.

## 2024-10-01 NOTE — Clinical Note
Known advanced TV disease, recurrent stays for CHF and pleural effusions. Home and weight stable. Please note to give torsemide and contact Cardio with any SOB. High risk of readmission

## 2024-10-03 ENCOUNTER — PATIENT OUTREACH (OUTPATIENT)
Dept: CASE MANAGEMENT | Facility: CLINIC | Age: 89
End: 2024-10-03
Payer: MEDICARE

## 2024-10-03 ENCOUNTER — LAB REQUISITION (OUTPATIENT)
Dept: LAB | Facility: HOSPITAL | Age: 89
End: 2024-10-03
Attending: INTERNAL MEDICINE
Payer: MEDICARE

## 2024-10-03 DIAGNOSIS — I50.9 HEART FAILURE, UNSPECIFIED (CMS/HCC): ICD-10-CM

## 2024-10-03 LAB
BASOPHILS # BLD: 0.04 K/UL (ref 0.01–0.1)
BASOPHILS NFR BLD: 0.8 %
DIFFERENTIAL METHOD BLD: ABNORMAL
EOSINOPHIL # BLD: 0.16 K/UL (ref 0.04–0.54)
EOSINOPHIL NFR BLD: 3.1 %
ERYTHROCYTE [DISTWIDTH] IN BLOOD BY AUTOMATED COUNT: 19.2 % (ref 11.6–14.4)
HCT VFR BLD AUTO: 33.1 % (ref 40.1–51)
HGB BLD-MCNC: 10.2 G/DL (ref 13.7–17.5)
IMM GRANULOCYTES # BLD AUTO: 0.01 K/UL (ref 0–0.08)
IMM GRANULOCYTES NFR BLD AUTO: 0.2 %
LYMPHOCYTES # BLD: 0.69 K/UL (ref 1.2–3.5)
LYMPHOCYTES NFR BLD: 13.2 %
MCH RBC QN AUTO: 32.7 PG (ref 28–33.2)
MCHC RBC AUTO-ENTMCNC: 30.8 G/DL (ref 32.2–36.5)
MCV RBC AUTO: 106.1 FL (ref 83–98)
MONOCYTES # BLD: 0.47 K/UL (ref 0.3–1)
MONOCYTES NFR BLD: 9 %
NEUTROPHILS # BLD: 3.87 K/UL (ref 1.7–7)
NEUTS SEG NFR BLD: 73.7 %
NRBC BLD-RTO: 0 %
PLATELET # BLD AUTO: 148 K/UL (ref 150–350)
PMV BLD AUTO: 10.4 FL (ref 9.4–12.4)
RBC # BLD AUTO: 3.12 M/UL (ref 4.5–5.8)
WBC # BLD AUTO: 5.24 K/UL (ref 3.8–10.5)

## 2024-10-03 PROCEDURE — 80053 COMPREHEN METABOLIC PANEL: CPT | Performed by: INTERNAL MEDICINE

## 2024-10-03 PROCEDURE — 83735 ASSAY OF MAGNESIUM: CPT | Performed by: INTERNAL MEDICINE

## 2024-10-03 PROCEDURE — 85025 COMPLETE CBC W/AUTO DIFF WBC: CPT | Performed by: INTERNAL MEDICINE

## 2024-10-03 NOTE — PROGRESS NOTES
Hospital to Home follow-up call   on: [phone]     ACM spoke to Marko-son.   Marko barron pt is doing okay this week, still having back pain. Pt is taking pain medication as prescribed.   Pt has  Saranya 24/7.    Marko barron pt's breathing has not been bad.  VS from Tuesday: /70 and weight 159 lbs.   [facility] PCP SOPHIA Rivera will visit today in home.   Marko barron pt is scheduled for Pulmonology 1/30/25 to establish care.  Marko was directed to call back within 1 month to see if any sooner appt was available. Marko  pt is on cancellation list.   [facility] pt will see cardiology 1st week of December.   Pt is compliant with medication regimen.   Pt is continuing with services through [facility]--Per Marko pt will have a visit today and blood work    ACM to follow up in 1 week-on/around 10/11/24  Care Plan: CHF Template   Updates made by Pilar Kirkpatrick RN since 10/3/2024 12:00 AM        Problem: Heart Failure Progression and Care Needs         Goal: Patient is adherent to regular PCP/Cardiology appointments         Task: Educate patient on importance of regular PCP/Cardiology appointments Completed 10/3/2024                Pilar Kirkpatrick RN BSN  Ambulatory Care Manager   [phone]

## 2024-10-04 LAB
ALBUMIN SERPL-MCNC: 3.2 G/DL (ref 3.5–5.7)
ALP SERPL-CCNC: 116 IU/L (ref 34–125)
ALT SERPL-CCNC: 7 IU/L (ref 7–52)
ANION GAP SERPL CALC-SCNC: 8 MEQ/L (ref 3–15)
AST SERPL-CCNC: 39 IU/L (ref 13–39)
BILIRUB SERPL-MCNC: 1.1 MG/DL (ref 0.3–1.2)
BUN SERPL-MCNC: 29 MG/DL (ref 7–25)
CALCIUM SERPL-MCNC: 8.6 MG/DL (ref 8.6–10.3)
CHLORIDE SERPL-SCNC: 107 MEQ/L (ref 98–107)
CO2 SERPL-SCNC: 25 MEQ/L (ref 21–31)
CREAT SERPL-MCNC: 1.6 MG/DL (ref 0.7–1.3)
EGFRCR SERPLBLD CKD-EPI 2021: 40.2 ML/MIN/1.73M*2
GLUCOSE SERPL-MCNC: 111 MG/DL (ref 70–99)
MAGNESIUM SERPL-MCNC: 2.2 MG/DL (ref 1.8–2.5)
POTASSIUM SERPL-SCNC: 4.4 MEQ/L (ref 3.5–5.1)
PROT SERPL-MCNC: 8.1 G/DL (ref 6–8.2)
SODIUM SERPL-SCNC: 140 MEQ/L (ref 136–145)

## 2024-10-07 ENCOUNTER — TELEPHONE (OUTPATIENT)
Dept: SURGERY | Facility: CLINIC | Age: 89
End: 2024-10-07

## 2024-10-07 NOTE — TELEPHONE ENCOUNTER
Nurse Letitia from [facility] home care just called and stated that she is there seeing the patient and the patient had surgery a while back by Dr. Bryant and has a Ostomy she stated that the patient Stoma is getting bigger and looks like it is swelling and there are charles in it and looks purple and his caregiver stated that he never had that before so she is asking for a call back [phone]

## 2024-10-07 NOTE — TELEPHONE ENCOUNTER
Phone call to Letitia with [facility] home care.  States went to see patient today and caregiver noticed stoma protruding into the bag and very swollen.  Caregiver notes normal ostomy output.  Patient had denied any pain.   Will see if patient can come in to be seen at 4pm tomorrow, 10/8/2024 to evaluate stoma. Letitia will call patient and caregiver to relay information.    PSRs, could you plasse call the patient's sonMarko to see if patient can come in tomorrow, 10/8 at 4pm with Dr. Bryant.  Thanks

## 2024-10-07 NOTE — TELEPHONE ENCOUNTER
Good Afternoon, Please see message above patients home care called and stated that the patients stoma is getting bigger and is swelling and has coloring to it. She is asking for a call back.

## 2024-10-08 NOTE — TELEPHONE ENCOUNTER
Called both sons (Ethan & Marko) to no avail, left vmail on Marko's stating date & time of avail apt Per Cynthia.

## 2024-10-08 NOTE — TELEPHONE ENCOUNTER
Spoke with Letitia from [facility] home care.  She will have WOCN come out within the next week and assess his ostomy.

## 2024-10-10 ENCOUNTER — PATIENT OUTREACH (OUTPATIENT)
Dept: CASE MANAGEMENT | Facility: CLINIC | Age: 89
End: 2024-10-10
Payer: MEDICARE

## 2024-10-10 NOTE — PROGRESS NOTES
Hospital to Home follow-up call   on: [phone]     ACM spoke to Marko-son.   Marko states Samy is okay this week. Pt tested positive for UTI on home test. Family contacted PCP and Bactrim was ordered.  Family felt like the Bactrim wasn't helping with UTI, so PCP was contacted again and family asked for Ceftin to be ordered. Family picked up Ceftin yesterday.  Too soon to see how Ceftin is working.     Most recent VS from [facility] 10/9/24:   HR 70 /60  POX 96%  Wt 155 lbs.     PCP visited pt in home 10/3/24 and will return next month.   Pulmonology appt 1/3/25   Cardiology appt-first week of December.   Pt has live in [facility].   Pt is compliant with medication regimen.   Pt is continuing with services through [facility]  Family states no further questions or concerns today regarding care.     ACM to follow up in 1 week-on/around 10/18/24      Care Plan: CHF Template   Updates made by Pilar Kirkpatrick RN since 10/10/2024 12:00 AM        Problem: Heart Failure Progression and Care Needs         Goal: Patient will verbalize understanding of CHF progression and care needs         Task: Educate patient on signs and symptoms of CHF complcations to report to clinician Completed 10/10/2024   Responsible User: Pilar Kirkpatrick RN                Pilar Kirkpatrick RN BSN  Ambulatory Care Manager   [phone]

## 2024-10-15 ENCOUNTER — HOSPITAL ENCOUNTER (INPATIENT)
Facility: HOSPITAL | Age: 89
LOS: 7 days | Discharge: HOME HEALTH | DRG: 393 | End: 2024-10-24
Attending: EMERGENCY MEDICINE | Admitting: INTERNAL MEDICINE
Payer: MEDICARE

## 2024-10-15 ENCOUNTER — APPOINTMENT (OUTPATIENT)
Dept: RADIOLOGY | Facility: HOSPITAL | Age: 89
Setting detail: OBSERVATION
DRG: 393 | End: 2024-10-15
Payer: MEDICARE

## 2024-10-15 ENCOUNTER — APPOINTMENT (OUTPATIENT)
Dept: RADIOLOGY | Facility: HOSPITAL | Age: 89
Setting detail: OBSERVATION
DRG: 393 | End: 2024-10-15
Attending: INTERNAL MEDICINE
Payer: MEDICARE

## 2024-10-15 DIAGNOSIS — K92.2 GASTROINTESTINAL HEMORRHAGE, UNSPECIFIED GASTROINTESTINAL HEMORRHAGE TYPE: Primary | ICD-10-CM

## 2024-10-15 DIAGNOSIS — K94.11: ICD-10-CM

## 2024-10-15 DIAGNOSIS — D69.6 THROMBOCYTOPENIA (CMS/HCC): ICD-10-CM

## 2024-10-15 DIAGNOSIS — D64.9 ANEMIA, UNSPECIFIED TYPE: ICD-10-CM

## 2024-10-15 DIAGNOSIS — R71.0 HEMOGLOBIN DROP: ICD-10-CM

## 2024-10-15 PROBLEM — S32.000A COMPRESSION FRACTURE OF LUMBAR VERTEBRA (CMS/HCC): Status: ACTIVE | Noted: 2024-10-15

## 2024-10-15 PROBLEM — J90 RECURRENT PLEURAL EFFUSION ON LEFT: Status: ACTIVE | Noted: 2024-10-15

## 2024-10-15 LAB
ABO + RH BLD: NORMAL
ANION GAP SERPL CALC-SCNC: 8 MEQ/L (ref 3–15)
APTT PPP: 32 SEC (ref 23–35)
BASOPHILS # BLD: 0.06 K/UL (ref 0.01–0.1)
BASOPHILS NFR BLD: 1.1 %
BLD GP AB SCN SERPL QL: NEGATIVE
BUN SERPL-MCNC: 26 MG/DL (ref 7–25)
CALCIUM SERPL-MCNC: 8.5 MG/DL (ref 8.6–10.3)
CHLORIDE SERPL-SCNC: 110 MEQ/L (ref 98–107)
CO2 SERPL-SCNC: 23 MEQ/L (ref 21–31)
CREAT SERPL-MCNC: 1.7 MG/DL (ref 0.7–1.3)
D AG BLD QL: POSITIVE
DIFFERENTIAL METHOD BLD: ABNORMAL
EGFRCR SERPLBLD CKD-EPI 2021: 37.1 ML/MIN/1.73M*2
EOSINOPHIL # BLD: 0.25 K/UL (ref 0.04–0.54)
EOSINOPHIL NFR BLD: 4.6 %
ERYTHROCYTE [DISTWIDTH] IN BLOOD BY AUTOMATED COUNT: 18.8 % (ref 11.6–14.4)
ERYTHROCYTE [DISTWIDTH] IN BLOOD BY AUTOMATED COUNT: 18.8 % (ref 11.6–14.4)
ERYTHROCYTE [DISTWIDTH] IN BLOOD BY AUTOMATED COUNT: 18.9 % (ref 11.6–14.4)
GLUCOSE SERPL-MCNC: 115 MG/DL (ref 70–99)
HCT VFR BLD AUTO: 27.8 % (ref 40.1–51)
HCT VFR BLD AUTO: 28.3 % (ref 40.1–51)
HCT VFR BLD AUTO: 31.8 % (ref 40.1–51)
HGB BLD-MCNC: 10.2 G/DL (ref 13.7–17.5)
HGB BLD-MCNC: 9.1 G/DL (ref 13.7–17.5)
HGB BLD-MCNC: 9.2 G/DL (ref 13.7–17.5)
IMM GRANULOCYTES # BLD AUTO: 0.02 K/UL (ref 0–0.08)
IMM GRANULOCYTES NFR BLD AUTO: 0.4 %
INR PPP: 1.5
LABORATORY COMMENT REPORT: NORMAL
LYMPHOCYTES # BLD: 0.64 K/UL (ref 1.2–3.5)
LYMPHOCYTES NFR BLD: 11.7 %
MCH RBC QN AUTO: 32.6 PG (ref 28–33.2)
MCH RBC QN AUTO: 33 PG (ref 28–33.2)
MCH RBC QN AUTO: 33 PG (ref 28–33.2)
MCHC RBC AUTO-ENTMCNC: 32.1 G/DL (ref 32.2–36.5)
MCHC RBC AUTO-ENTMCNC: 32.5 G/DL (ref 32.2–36.5)
MCHC RBC AUTO-ENTMCNC: 32.7 G/DL (ref 32.2–36.5)
MCV RBC AUTO: 100.7 FL (ref 83–98)
MCV RBC AUTO: 101.4 FL (ref 83–98)
MCV RBC AUTO: 101.6 FL (ref 83–98)
MONOCYTES # BLD: 0.39 K/UL (ref 0.3–1)
MONOCYTES NFR BLD: 7.1 %
NEUTROPHILS # BLD: 4.11 K/UL (ref 1.7–7)
NEUTS SEG NFR BLD: 75.1 %
NRBC BLD-RTO: 0 %
PLATELET # BLD AUTO: 103 K/UL (ref 150–350)
PLATELET # BLD AUTO: 108 K/UL (ref 150–350)
PLATELET # BLD AUTO: 123 K/UL (ref 150–350)
PMV BLD AUTO: 10 FL (ref 9.4–12.4)
PMV BLD AUTO: 10 FL (ref 9.4–12.4)
PMV BLD AUTO: 9.6 FL (ref 9.4–12.4)
POTASSIUM SERPL-SCNC: 4.2 MEQ/L (ref 3.5–5.1)
PROTHROMBIN TIME: 17.9 SEC (ref 12.2–14.5)
RBC # BLD AUTO: 2.76 M/UL (ref 4.5–5.8)
RBC # BLD AUTO: 2.79 M/UL (ref 4.5–5.8)
RBC # BLD AUTO: 3.13 M/UL (ref 4.5–5.8)
SODIUM SERPL-SCNC: 141 MEQ/L (ref 136–145)
SPECIMEN EXP DATE BLD: NORMAL
WBC # BLD AUTO: 4.49 K/UL (ref 3.8–10.5)
WBC # BLD AUTO: 4.83 K/UL (ref 3.8–10.5)
WBC # BLD AUTO: 5.47 K/UL (ref 3.8–10.5)

## 2024-10-15 PROCEDURE — 99223 1ST HOSP IP/OBS HIGH 75: CPT | Performed by: COLON & RECTAL SURGERY

## 2024-10-15 PROCEDURE — G0378 HOSPITAL OBSERVATION PER HR: HCPCS

## 2024-10-15 PROCEDURE — 63600000 HC DRUGS/DETAIL CODE: Mod: JZ | Performed by: EMERGENCY MEDICINE

## 2024-10-15 PROCEDURE — C1894 INTRO/SHEATH, NON-LASER: HCPCS

## 2024-10-15 PROCEDURE — 85730 THROMBOPLASTIN TIME PARTIAL: CPT

## 2024-10-15 PROCEDURE — 85610 PROTHROMBIN TIME: CPT | Performed by: EMERGENCY MEDICINE

## 2024-10-15 PROCEDURE — 80048 BASIC METABOLIC PNL TOTAL CA: CPT | Performed by: EMERGENCY MEDICINE

## 2024-10-15 PROCEDURE — 86923 COMPATIBILITY TEST ELECTRIC: CPT

## 2024-10-15 PROCEDURE — 99284 EMERGENCY DEPT VISIT MOD MDM: CPT | Mod: 25

## 2024-10-15 PROCEDURE — 82310 ASSAY OF CALCIUM: CPT

## 2024-10-15 PROCEDURE — 85025 COMPLETE CBC W/AUTO DIFF WBC: CPT | Performed by: EMERGENCY MEDICINE

## 2024-10-15 PROCEDURE — 85610 PROTHROMBIN TIME: CPT

## 2024-10-15 PROCEDURE — 99285 EMERGENCY DEPT VISIT HI MDM: CPT | Mod: 25

## 2024-10-15 PROCEDURE — 76937 US GUIDE VASCULAR ACCESS: CPT

## 2024-10-15 PROCEDURE — 85025 COMPLETE CBC W/AUTO DIFF WBC: CPT

## 2024-10-15 PROCEDURE — 74174 CTA ABD&PLVS W/CONTRAST: CPT

## 2024-10-15 PROCEDURE — 85027 COMPLETE CBC AUTOMATED: CPT | Performed by: INTERNAL MEDICINE

## 2024-10-15 PROCEDURE — 36415 COLL VENOUS BLD VENIPUNCTURE: CPT

## 2024-10-15 PROCEDURE — 75894 X-RAYS TRANSCATH THERAPY: CPT

## 2024-10-15 PROCEDURE — 85027 COMPLETE CBC AUTOMATED: CPT | Performed by: EMERGENCY MEDICINE

## 2024-10-15 PROCEDURE — B414YZZ FLUOROSCOPY OF SUPERIOR MESENTERIC ARTERY USING OTHER CONTRAST: ICD-10-PCS | Performed by: RADIOLOGY

## 2024-10-15 PROCEDURE — C1760 CLOSURE DEV, VASC: HCPCS

## 2024-10-15 PROCEDURE — 63600105 HC IODINE BASED CONTRAST: Performed by: INTERNAL MEDICINE

## 2024-10-15 PROCEDURE — 96374 THER/PROPH/DIAG INJ IV PUSH: CPT | Mod: 59

## 2024-10-15 PROCEDURE — 85730 THROMBOPLASTIN TIME PARTIAL: CPT | Performed by: EMERGENCY MEDICINE

## 2024-10-15 PROCEDURE — 86850 RBC ANTIBODY SCREEN: CPT

## 2024-10-15 PROCEDURE — C1887 CATHETER, GUIDING: HCPCS

## 2024-10-15 PROCEDURE — B415YZZ FLUOROSCOPY OF INFERIOR MESENTERIC ARTERY USING OTHER CONTRAST: ICD-10-PCS | Performed by: RADIOLOGY

## 2024-10-15 PROCEDURE — C1769 GUIDE WIRE: HCPCS

## 2024-10-15 PROCEDURE — 63600105 HC IODINE BASED CONTRAST: Mod: JZ

## 2024-10-15 PROCEDURE — 99223 1ST HOSP IP/OBS HIGH 75: CPT | Performed by: INTERNAL MEDICINE

## 2024-10-15 PROCEDURE — 25000000 HC PHARMACY GENERAL: Performed by: RADIOLOGY

## 2024-10-15 RX ORDER — METHENAMINE HIPPURATE 1000 MG/1
1 TABLET ORAL DAILY
Status: DISCONTINUED | OUTPATIENT
Start: 2024-10-16 | End: 2024-10-24 | Stop reason: HOSPADM

## 2024-10-15 RX ORDER — PANTOPRAZOLE SODIUM 40 MG/10ML
40 INJECTION, POWDER, LYOPHILIZED, FOR SOLUTION INTRAVENOUS EVERY 12 HOURS
Status: DISCONTINUED | OUTPATIENT
Start: 2024-10-16 | End: 2024-10-21

## 2024-10-15 RX ORDER — PNV NO.95/FERROUS FUM/FOLIC AC 28MG-0.8MG
500 TABLET ORAL EVERY MORNING
Status: DISCONTINUED | OUTPATIENT
Start: 2024-10-15 | End: 2024-10-24 | Stop reason: HOSPADM

## 2024-10-15 RX ORDER — TORSEMIDE 10 MG/1
10 TABLET ORAL DAILY
Status: DISCONTINUED | OUTPATIENT
Start: 2024-10-16 | End: 2024-10-24 | Stop reason: HOSPADM

## 2024-10-15 RX ORDER — LATANOPROST 50 UG/ML
1 SOLUTION/ DROPS OPHTHALMIC NIGHTLY
Status: DISCONTINUED | OUTPATIENT
Start: 2024-10-15 | End: 2024-10-24 | Stop reason: HOSPADM

## 2024-10-15 RX ORDER — FINASTERIDE 5 MG/1
5 TABLET, FILM COATED ORAL DAILY
Status: DISCONTINUED | OUTPATIENT
Start: 2024-10-16 | End: 2024-10-24 | Stop reason: HOSPADM

## 2024-10-15 RX ORDER — FAMOTIDINE 20 MG/1
10 TABLET, FILM COATED ORAL DAILY
Status: DISCONTINUED | OUTPATIENT
Start: 2024-10-15 | End: 2024-10-16

## 2024-10-15 RX ORDER — ESCITALOPRAM OXALATE 5 MG/1
5 TABLET ORAL EVERY MORNING
Status: DISCONTINUED | OUTPATIENT
Start: 2024-10-16 | End: 2024-10-24 | Stop reason: HOSPADM

## 2024-10-15 RX ORDER — PANTOPRAZOLE SODIUM 40 MG/10ML
40 INJECTION, POWDER, LYOPHILIZED, FOR SOLUTION INTRAVENOUS ONCE
Status: COMPLETED | OUTPATIENT
Start: 2024-10-15 | End: 2024-10-15

## 2024-10-15 RX ORDER — LEVOTHYROXINE SODIUM 100 UG/1
100 TABLET ORAL
Status: DISCONTINUED | OUTPATIENT
Start: 2024-10-16 | End: 2024-10-24 | Stop reason: HOSPADM

## 2024-10-15 RX ORDER — IOPAMIDOL 612 MG/ML
300 INJECTION, SOLUTION INTRAVASCULAR
Status: COMPLETED | OUTPATIENT
Start: 2024-10-15 | End: 2024-10-15

## 2024-10-15 RX ORDER — CEFUROXIME AXETIL 250 MG/1
250 TABLET ORAL 2 TIMES DAILY
Status: DISCONTINUED | OUTPATIENT
Start: 2024-10-15 | End: 2024-10-16

## 2024-10-15 RX ORDER — CARBOXYMETHYLCELLULOSE SODIUM 5 MG/ML
1 SOLUTION/ DROPS OPHTHALMIC 4 TIMES DAILY PRN
Status: DISCONTINUED | OUTPATIENT
Start: 2024-10-15 | End: 2024-10-24 | Stop reason: HOSPADM

## 2024-10-15 RX ORDER — CEFUROXIME AXETIL 250 MG/1
500 TABLET ORAL 2 TIMES DAILY
Status: DISCONTINUED | OUTPATIENT
Start: 2024-10-15 | End: 2024-10-15

## 2024-10-15 RX ORDER — LIDOCAINE HYDROCHLORIDE 10 MG/ML
INJECTION, SOLUTION INFILTRATION; PERINEURAL
Status: COMPLETED | OUTPATIENT
Start: 2024-10-15 | End: 2024-10-15

## 2024-10-15 RX ORDER — LIDOCAINE 560 MG/1
1 PATCH PERCUTANEOUS; TOPICAL; TRANSDERMAL DAILY
Status: DISCONTINUED | OUTPATIENT
Start: 2024-10-15 | End: 2024-10-24 | Stop reason: HOSPADM

## 2024-10-15 RX ORDER — TAMSULOSIN HYDROCHLORIDE 0.4 MG/1
0.4 CAPSULE ORAL DAILY
Status: DISCONTINUED | OUTPATIENT
Start: 2024-10-15 | End: 2024-10-20

## 2024-10-15 RX ORDER — DEXTROMETHORPHAN HYDROBROMIDE, GUAIFENESIN 5; 100 MG/5ML; MG/5ML
1300 LIQUID ORAL EVERY 8 HOURS PRN
COMMUNITY
End: 2024-10-15

## 2024-10-15 RX ORDER — MELATONIN 5 MG
5 CAPSULE ORAL NIGHTLY PRN
Status: DISCONTINUED | OUTPATIENT
Start: 2024-10-15 | End: 2024-10-24 | Stop reason: HOSPADM

## 2024-10-15 RX ORDER — SODIUM CHLORIDE 9 MG/ML
5 INJECTION, SOLUTION INTRAVENOUS AS NEEDED
Status: ACTIVE | OUTPATIENT
Start: 2024-10-15 | End: 2024-10-16

## 2024-10-15 RX ORDER — ACETAMINOPHEN 325 MG/1
650 TABLET ORAL EVERY 6 HOURS PRN
Status: DISCONTINUED | OUTPATIENT
Start: 2024-10-15 | End: 2024-10-24 | Stop reason: HOSPADM

## 2024-10-15 RX ORDER — CLOPIDOGREL BISULFATE 75 MG/1
75 TABLET ORAL EVERY MORNING
Status: DISCONTINUED | OUTPATIENT
Start: 2024-10-16 | End: 2024-10-20

## 2024-10-15 RX ORDER — THIAMINE HCL 100 MG
100 TABLET ORAL DAILY
Status: DISCONTINUED | OUTPATIENT
Start: 2024-10-16 | End: 2024-10-24 | Stop reason: HOSPADM

## 2024-10-15 RX ORDER — IOPAMIDOL 755 MG/ML
100 INJECTION, SOLUTION INTRAVASCULAR
Status: COMPLETED | OUTPATIENT
Start: 2024-10-15 | End: 2024-10-15

## 2024-10-15 RX ORDER — CEFUROXIME AXETIL 500 MG/1
500 TABLET ORAL 2 TIMES DAILY
COMMUNITY
End: 2024-10-24 | Stop reason: HOSPADM

## 2024-10-15 RX ADMIN — IOPAMIDOL 30 ML: 612 INJECTION, SOLUTION INTRAVENOUS at 22:13

## 2024-10-15 RX ADMIN — IOPAMIDOL 100 ML: 755 INJECTION, SOLUTION INTRAVENOUS at 18:39

## 2024-10-15 RX ADMIN — LIDOCAINE HYDROCHLORIDE 5 ML: 10 INJECTION, SOLUTION INFILTRATION; PERINEURAL at 21:40

## 2024-10-15 RX ADMIN — PANTOPRAZOLE SODIUM 40 MG: 40 INJECTION, POWDER, LYOPHILIZED, FOR SOLUTION INTRAVENOUS at 14:14

## 2024-10-15 ASSESSMENT — COGNITIVE AND FUNCTIONAL STATUS - GENERAL
STANDING UP FROM CHAIR USING ARMS: 3 - A LITTLE
MOVING TO AND FROM BED TO CHAIR: 3 - A LITTLE
WALKING IN HOSPITAL ROOM: 3 - A LITTLE
CLIMB 3 TO 5 STEPS WITH RAILING: 2 - A LOT

## 2024-10-15 NOTE — TELEPHONE ENCOUNTER
Good Morning, please see message above patient home health nurse has concerns regarding patients stoma

## 2024-10-15 NOTE — H&P
"   Hospital Medicine  History & Physical        CHIEF COMPLAINT     \"He had blood clots coming out of his ostomy\" x 1 Day      HISTORY OF PRESENT ILLNESS        Mr. 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 History was obtained from the patient, his caregiver, and son at the bedside. Per caregiver, around 9AM this morning she noticed that there was a large amount of dark clotted blood oozing from his stoma. She also noticed that the stoma was protruding outward. She immediately became concerned. Patient was additionally experiencing mild dizziness and lightheadedness while sitting up. He denies any chest pain or shortness of breath. After this occurred he was [place] the [facility] [place]. Of note, caregiver states she did notice small amounts of blood in his stoma bag for the past week but no major bleeding. Patient denies any fevers, chills, nausea, or vomiting. He was recently started on a course of cefuroxime for a UTI, has 4 days left of this medication. They endorse compliance with all his medications. He does not use tobacco, alcohol, or any drugs.     Confirmed code status as DNR/DNI.    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)     Coronary artery disease     Disease of thyroid gland     Will catheter in place     6/25 not at present    GI (gastrointestinal bleed)     Hypertension     Myocardial infarction (CMS/HCC)     Orthostatic " hypotension     TIA (transient ischemic attack)        Past Surgical History   Procedure Laterality Date    cataract extraction right eye with implant and limbal relaxing incision Right 7/18/2024    Performed by Hayder Moses MD at  OR [facility]    Cataract extraction w/ intraocular lens implant Left     Cataract extraction with LRI and anterior vitrectomy left eye Left 11/16/2023    Performed by Hayder Moses MD at  OR [facility]    Cholecystectomy      Colostomy      COLOSTOMY LAPAROSCOPIC N/A 8/3/2022    [place]formed [place] [facility] [place]    Coronary artery bypass graft      INCISION AND DRAINAGE WITH DEBRIDMENT OF BUTTOCK, AND PERINEUM N/A 8/2/2022    [place]formed by Mat [place] [facility] [place]    Joint replacement Left     Knee    RIGHT HEART CATH N/A 8/4/2022    Performed by Cristal Lacey MD at [facility] [place]    Thyroidectomy      WASHOUT OF PERINEAL WOUND, COLONIC LAVAGE N/A 8/3/2022    Performed by Mat Bryant MD at [facility] [place]       PCP: Zachery Hernandez MD    MEDICATIONS      Prior to Admission medications    Medication Sig Start Date End Date Taking? Authorizing Provider   bimatoprost (LUMIGAN) 0.01 % ophthalmic drops Administer 1 drop into the left eye nightly.   Yes ProviderClay MD   carboxymethylcellulose (REFRESH PLUS) 0.5 % dropperette Administer 1 drop into both eyes 4 (four) times a day as needed for dry eyes.   Yes Provider, Clay Lopez MD   cefuroxime (CEFTIN) 500 mg tablet Take 500 mg by mouth 2 (two) times a day.   Yes ProviderClay MD   clopidogreL (PLAVIX) 75 mg tablet Take 75 mg by mouth every morning.   Yes ProviderClay MD   cyanocobalamin (VITAMIN B12) 500 mcg tablet Take 500 mcg by mouth every morning.   Yes ProviderClay MD   escitalopram (LEXAPRO) 5 mg tablet Take 5 mg by mouth every morning.   Yes ProviderClay MD   famotidine (PEPCID) 10 mg tablet Take 1 tablet (10 mg total) by mouth daily Indications:  gastroesophageal reflux disease. 12/8/23 3/26/25 Yes Crystal Alonso DO   finasteride (PROSCAR) 5 mg tablet Take 1 tablet (5 mg total) by mouth daily. 8/2/24 9/24/25 Yes Alex Morris DO   levothyroxine (SYNTHROID) 100 mcg tablet Take 100 mcg by mouth daily.   Yes Clay Chambers MD   methenamine (HIPREX) 1 gram tablet Take 1 g by mouth daily before lunch.   Yes Clay Chambers MD   metoprolol succinate XL (TOPROL-XL) 25 mg 24 hr tablet Take 12.5 mg by mouth daily with dinner.   Yes Clay Chambers MD   tamsulosin (FLOMAX) 0.4 mg capsule Take 1 capsule (0.4 mg total) by mouth daily. 8/2/24 9/24/25 Yes Alex Morris DO   thiamine 50 mg tablet Take 100 mg by mouth daily.   Yes Clay Chambers MD   torsemide (DEMADEX) 10 mg tablet Take 10 mg by mouth daily.   Yes ProviderClay MD   acetaminophen (TYLENOL) 650 mg 8 hr tablet Take 1,300 mg by mouth every 8 (eight) hours as needed for pain.  10/15/24  Clay Chambers MD   amLODIPine (NORVASC) 2.5 mg tablet amlodipine 2.5 mg tablet  7/22/22  John Chambers MD   apixaban (ELIQUIS) 2.5 mg tablet   7/22/22  John Chambers MD   calcium carbonate (TUMS) 200 mg calcium (500 mg) chewable tablet Take 1 tablet by mouth 2 (two) times a day.  10/15/24  Clay Chambers MD   cranberry fruit concentrate (AZO CRANBERRY ORAL) Take 1 tablet by mouth daily.  10/15/24  Clay Chambers MD   hydrochlorothiazide (MICROZIDE) 12.5 mg capsule hydrochlorothiazide 12.5 mg capsule  7/22/22  Jonh Chambers MD   lidocaine (ASPERCREME) 4 % adhesive patch,medicated topical patch Apply 1 patch topically daily as needed. Remove & discard patch within 12 hours or as directed by prescriber.  10/15/24  Clay Chambers MD   melatonin 3 mg tablet Take 3 mg by mouth nightly.  10/15/24  Clay Chambers MD   omeprazole (PriLOSEC) 20 mg capsule Take 20 mg by mouth daily with lunch.  5/25/24 10/15/24  ProviderClay MD   potassium chloride (MICRO-K) 10 mEq CR capsule Take 10 mEq by mouth 2 (two) times a day.  10/15/24  ProviderClay MD   tobramycin (TOBREX) 0.3 % ophthalmic solution Administer 1 drop into the right eye 3 (three) times a day.  10/15/24  ProviderClay MD       ALLERGIES      Jardiance [empagliflozin]    FAMILY HISTORY      No family history on file.    SOCIAL HISTORY      Social History     Socioeconomic History    Marital status:      Spouse name: None    Number of children: None    Years of education: None    Highest education level: None   Tobacco Use    Smoking status: Never    Smokeless tobacco: Never   Vaping Use    Vaping status: Never Used   Substance and Sexual Activity    Alcohol use: Not Currently     Comment: social    Drug use: Never    Sexual activity: Defer     Social Drivers of Health     Financial Resource Strain: Low Risk  (8/4/2023)    Overall Financial Resource Strain (CARDIA)     Difficulty of Paying Living Expenses: Not hard at all   Food Insecurity: No Food Insecurity (10/15/2024)    Hunger Vital Sign     Worried About Running Out of Food in the Last Year: Never true     Ran Out of Food in the Last Year: Never true   Transportation Needs: No Transportation Needs (9/26/2024)    PRAPARE - Transportation     Lack of Transportation (Medical): No     Lack of Transportation (Non-Medical): No   Housing Stability: Low Risk  (9/26/2024)    Housing Stability Vital Sign     Unable to Pay for Housing in the Last Year: No     Number of Times Moved in the Last Year: 0     Homeless in the Last Year: No       REVIEW OF SYSTEMS      All other systems reviewed and negative except as noted in HPI    PHYSICAL EXAMINATION      Temp:  [35.8 °C (96.5 °F)-36.3 °C (97.4 °F)] 36.3 °C (97.4 °F)  Heart Rate:  [68-89] 71  Resp:  [16-26] 20  BP: (102-131)/(55-70) 121/58  Body mass index is 20.79 kg/m².    Physical Exam  General: Elderly appearing  male, NAD, Conversant, AAOx3  HEENT: PERRL, EOMI, MMM, Decreased Hearing  Cardiovascular: S1/S2, RRR, Murmur appreciated  Respiratory: CTA B/L  Abdomen: Soft, non-tender, stoma oozing bright red blood into bag   Extremities: No LE edema noted  Skin: Warm and Dry  Psych: Appropriate mood and affect     LABS / IMAGING / EKG        Labs  Reviewed, Hgb 10.2, INR 1.5, Cr 1.7,      Imaging  I have independently reviewed the pertinent imaging from the last 24 hrs.      Telemetry  NSR     ASSESSMENT AND PLAN            * GI bleeding  Assessment & Plan  -[place] [facility] [place] 1 day of bleeding from ostomy; does have history of diverticulosis and prior history of bleeding  -Not on A/C but is on Plavix  -Upon presentation, VSS, Hgb stable at 10.2->9.2  -On my assessment I noticed active bleeding from ostomy, obtained CTA  -CTA revealing acute GI bleed within the colon just proximal to the left lower quadrant colostomy  -Personally discussed with both GI and IR upon admission, IR performed successful subselective SMA and MARIE arteriography without evidence of discrete active bleed or PSA. Successful wire dissection at the origin of the  sigmoidal artery, accounting for the territory of the bleed. Appreciate assistance with this case  -Further have started IV PPI BID, trending CBC q6h for now, CLD for now but NPO after midnight, holding plavix   -Will transfuse for goal Hgb >8   -GI consulted as above     Recurrent pleural effusion on left  Assessment & Plan  -CT Imaging revealing moderate to large left pleural effusion  -Monitor oxygen status closely  -Can consider repeat thoracentesis while admitted once acute issues resolved     Compression fracture of lumbar vertebra (CMS/HCC)  Assessment & Plan  -CT Imaging incidentally revealing age-indeterminate L1 vertebral body compression fracture, new since July 2024  -Pain control with lidocaine patch and PRN acetaminophen   -Will need PT/OT evaluation once GI bleed is  resolved     History of recurrent UTIs  Assessment & Plan  -Recent started on course of cefuroxime, will continue upon admission  -Repeat UA     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[facility])  Assessment & Plan  -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)  -General surgery evaluated upon admission, recommending GIB work up     CKD (chronic kidney disease) stage 4, GFR 15-29 ml/min (CMS/[facility])  Assessment & Plan  -Cr of 1.7 upon admission, baseline ~1.6-1.7  -Will need to monitor Cr closely s/p CTA  -Avoid nephrotoxic agents     Depression  Assessment & Plan  -Continuing home lexapro 5mg qD     Hypothyroidism  Assessment & Plan  -Continuing home levothyroxine     Coronary artery disease  Assessment & Plan  -S/p CABG  -Holding home Plavix in setting of GI bleed  -Does not appear to be on a statin     Hypertension  Assessment & Plan  -BP low-normal upon admission  -Holding home Metop XL and Torsemide in setting of GI Bleed       VTE Assessment: Padua    VTE Prophylaxis: SCD's   Code Status: DNR (A.N.D.)      Estimated Discharge Date: 10/16/2024  Disposition Planning: Pending stable Hgb; GI evaluation      Isai Jaramillo, DO  10/15/2024

## 2024-10-15 NOTE — ED PROVIDER NOTES
"--------------------------------------------------------------------------------------------------  ER 5    HPI  HPI   Chief Complaint   Patient presents with   • GI Problem      HPI        Colostomy problem    89 y/o male with pmhx HFrEF, TR, diverticulitis s/p colostomy, chronic pleural effusion, Afib, CAD s/p CABG presenting after passing clots and dark red blood through his stoma. This started yesterday and has been episodic. Prior to yesterday, he was passing normal brown stool. He denies stoma pain or blood coming from the skin around the stoma, but says it is \"stuck.\" This has never happened to him before. He endorses mild lightheadedness and chronic fatigue. He had one episode of nonbloody yellow emesis 3 days ago. He denies abdominal pain, nausea, fevers, chills, CP, SOB. He takes Plavix but no other blood thinners.     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)    • Coronary artery disease    • Disease of thyroid gland    • Will catheter in place     6/25 not at present   • GI (gastrointestinal bleed)    • Hypertension    • Myocardial infarction (CMS/HCC)    • Orthostatic hypotension    • TIA (transient ischemic attack)      Past Surgical History   Procedure Laterality Date   • cataract extraction right eye with implant and limbal relaxing incision Right 7/18/2024    Performed by Hayder Moses MD at  [facility]   • Cataract extraction w/ intraocular lens implant Left    • Cataract extraction with LRI and anterior vitrectomy left eye Left 11/16/2023    Performed by Hayder Moses MD at  [facility]   • Cholecystectomy     • Colostomy     • " COLOSTOMY LAPAROSCOPIC N/A 8/3/2022    [place] [facility]   • Coronary artery bypass graft     • INCISION AND DRAINAGE WITH DEBRIDMENT OF BUTTOCK, AND PERINEUM N/A 8/2/2022    [place] [facility]   • Joint replacement Left     Knee   • RIGHT HEART CATH N/A 8/4/2022    Performed [place] Cristal [place] [facility] [place]   • Thyroidectomy     • WASHOUT OF PERINEAL WOUND, COLONIC LAVAGE N/A 8/3/2022    [place] [facility] [place]     No family history on file.  Social History     Tobacco Use   • Smoking status: Never   • Smokeless tobacco: Never   Vaping Use   • Vaping status: Never Used   Substance Use Topics   • Alcohol use: Not Currently     Comment: social   • Drug use: Never           ROS  Review of Systems   Review of Systems    General     No fever, chills, or diaphoresis.    No recent illnesses.  HEENT    No congestion, rhinorrhea, sore throat or trouble swallowing.     No photophobia or discharge.    No neck pain  Cardio/Pulm    No CP or tightness, palpitations, leg swelling, shortness of breath, or orthopnea.     No cough, wheezing, or stridor.  Abdomen and Back    No new back pain.     No dysuria, flank pain, frequency, hematuria or urgency.  Extremities and Skin    No joint swelling.    No new rash.  Neuro/Psych    No syncope, focal weakness, or headaches.    No confusion or self-injury.    All other systems reviewed and are negative.    Systems reviewed from RN triage:             Prior Discharge Med List:     Medication List        ASK your doctor about these medications      calcium (as carbonate) 200 mg calcium (500 mg) chewable tablet  Commonly known as: TUMS  Take 1 tablet by mouth 2 (two) times a day.  Dose: 1 tablet     carboxymethylcellulose 0.5 % dropperette  Commonly known as: REFRESH PLUS  Administer 1 drop into both eyes 4 (four) times a day as needed for dry eyes.  Dose: 1 drop     clopidogreL 75 mg tablet  Commonly known as:  PLAVIX  Take 75 mg by mouth every morning.  Dose: 75 mg     cyanocobalamin 500 mcg tablet  Commonly known as: VITAMIN B12  Take 500 mcg by mouth every morning.  Dose: 500 mcg     escitalopram 5 mg tablet  Commonly known as: LEXAPRO  Take 5 mg by mouth every morning.  Dose: 5 mg     famotidine 10 mg tablet  Commonly known as: PEPCID  Take 1 tablet (10 mg total) by mouth daily Indications: gastroesophageal reflux disease.  Dose: 10 mg     finasteride 5 mg tablet  Commonly known as: PROSCAR  Take 1 tablet (5 mg total) by mouth daily.  Dose: 5 mg     levothyroxine 100 mcg tablet  Commonly known as: SYNTHROID  Take 100 mcg by mouth daily.  Dose: 100 mcg     lidocaine 4 % adhesive patch,medicated topical patch  Commonly known as: ASPERCREME  Apply 1 patch topically daily as needed. Remove & discard patch within 12 hours or as directed by prescriber.  Dose: 1 patch     LUMIGAN 0.01 % ophthalmic drops  Administer 1 drop into the left eye nightly.  Dose: 1 drop  Generic drug: bimatoprost     melatonin 3 mg tablet  Take 3 mg by mouth nightly.  Dose: 3 mg     methenamine 1 gram tablet  Commonly known as: HIPREX  Take 1 g by mouth daily before lunch.  Dose: 1 g     metoprolol succinate XL 25 mg 24 hr tablet  Commonly known as: TOPROL-XL  Take 12.5 mg by mouth daily with dinner.  Dose: 12.5 mg     omeprazole 20 mg capsule  Commonly known as: PriLOSEC  Take 20 mg by mouth daily with lunch.  Dose: 20 mg     potassium chloride 10 mEq CR capsule  Commonly known as: MICRO-K  Take 10 mEq by mouth 2 (two) times a day.  Dose: 10 mEq     tamsulosin 0.4 mg capsule  Commonly known as: FLOMAX  Take 1 capsule (0.4 mg total) by mouth daily.  Dose: 0.4 mg     thiamine 50 mg tablet  Take 50 mg by mouth daily.  Dose: 50 mg     tobramycin 0.3 % ophthalmic solution  Commonly known as: TOBREX  Administer 1 drop into the right eye 3 (three) times a day.  Dose: 1 drop     torsemide 10 mg tablet  Commonly known as: DEMADEX  Take 10 mg by mouth  daily.  Dose: 10 mg              Current Meds including meds today:  No current facility-administered medications for this encounter.    Current Outpatient Medications:   •  bimatoprost (LUMIGAN) 0.01 % ophthalmic drops, Administer 1 drop into the left eye nightly., Disp: , Rfl:   •  calcium carbonate (TUMS) 200 mg calcium (500 mg) chewable tablet, Take 1 tablet by mouth 2 (two) times a day., Disp: , Rfl:   •  carboxymethylcellulose (REFRESH PLUS) 0.5 % dropperette, Administer 1 drop into both eyes 4 (four) times a day as needed for dry eyes., Disp: , Rfl:   •  clopidogreL (PLAVIX) 75 mg tablet, Take 75 mg by mouth every morning., Disp: , Rfl:   •  cyanocobalamin (VITAMIN B12) 500 mcg tablet, Take 500 mcg by mouth every morning., Disp: , Rfl:   •  escitalopram (LEXAPRO) 5 mg tablet, Take 5 mg by mouth every morning., Disp: , Rfl:   •  famotidine (PEPCID) 10 mg tablet, Take 1 tablet (10 mg total) by mouth daily Indications: gastroesophageal reflux disease., Disp: 30 tablet, Rfl: 0  •  finasteride (PROSCAR) 5 mg tablet, Take 1 tablet (5 mg total) by mouth daily., Disp: 30 tablet, Rfl: 0  •  levothyroxine (SYNTHROID) 100 mcg tablet, Take 100 mcg by mouth daily., Disp: , Rfl:   •  lidocaine (ASPERCREME) 4 % adhesive patch,medicated topical patch, Apply 1 patch topically daily as needed. Remove & discard patch within 12 hours or as directed by prescriber., Disp: , Rfl:   •  melatonin 3 mg tablet, Take 3 mg by mouth nightly., Disp: , Rfl:   •  methenamine (HIPREX) 1 gram tablet, Take 1 g by mouth daily before lunch., Disp: , Rfl:   •  metoprolol succinate XL (TOPROL-XL) 25 mg 24 hr tablet, Take 12.5 mg by mouth daily with dinner., Disp: , Rfl:   •  omeprazole (PriLOSEC) 20 mg capsule, Take 20 mg by mouth daily with lunch., Disp: , Rfl:   •  potassium chloride (MICRO-K) 10 mEq CR capsule, Take 10 mEq by mouth 2 (two) times a day., Disp: , Rfl:   •  tamsulosin (FLOMAX) 0.4 mg capsule, Take 1 capsule (0.4 mg total) by mouth  daily., Disp: 30 capsule, Rfl: 0  •  thiamine 50 mg tablet, Take 50 mg by mouth daily., Disp: , Rfl:   •  tobramycin (TOBREX) 0.3 % ophthalmic solution, Administer 1 drop into the right eye 3 (three) times a day., Disp: , Rfl:   •  torsemide (DEMADEX) 10 mg tablet, Take 10 mg by mouth daily., Disp: , Rfl:     EXAM  Physical Exam   ED Triage Vitals [10/15/24 1207]   Temp Heart Rate Resp BP SpO2   (!) 35.8 °C (96.5 °F) 89 16 (!) 114/55 98 %      Temp Source Heart Rate Source Patient Position BP Location FiO2 (%) (Set)   Temporal Monitor Lying Left upper arm --     Physical Exam      General    Elderly and frail but awake, alert, conversant and generally non-toxic appearance. NAD.  HEENT    Normocephalic and atraumatic.    Conjunctivae, EOM and lids are grossly normal. PERRL.    No rhinorrhea.      Dry MM    Normal ROM and full passive ROM of neck without pain. Neck supple.    No JVD. No tracheal deviation present.  Cardio/Pulm    RRR, normal heart sounds and intact distal pulses. No murmur heard.    No respiratory distress. Effort normal and normal BS bilat.    No stridor. No wheezes and no rales.  Abdomen and Back    Abd Soft     Stoma protruding, mucousa pink, nontender to palp    Mild amount of blood at ostomy currently    BS normal. Non tender, No distension.    No rebound or guarding. No CVA tenderness.  Extremities and Skin    Normal hip ROM bilat, knees are stable and LE are NVI distally    Extrem are otherwise grossly normal. normal ROM. No edema or tenderness.    Skin is warm, dry and grossly intact. No rash noted. Not diaphoretic. No pallor.  Neuro/Psych    Elderly and frail but awake, alert, conversant. [place]    AAOx3. Cooperative and mood/affect normal.    No aphasia. Speech normal. No dysphasia or dysarthria.    Vision grossly normal     No gross CN deficit or facial weakness.    Generally weak but No gross focal motor deficit and moves all extremities equally.      No gross sensory deficit.        Nursing  note and vitals reviewed.    Pulse Ox 98% on RA,  interpretation = normal        Procedures    COURSE  Medical Decision Making     ED Course & MDM       Critical Care  Performed by: JOSÉ MIGUEL VÁSQUEZ  Authorized by: JOSÉ MIGUEL VÁSQUEZ     Critical care provider statement:     Critical care time (minutes):  35    Critical care time was exclusive of:  Separately billable procedures and treating other patients    Critical care was time spent personally by me on the following activities:  Development of treatment plan with patient or surrogate, discussions with consultants, discussions with primary provider, evaluation of patient's response to treatment, examination of patient, review of old charts, re-evaluation of patient's condition, pulse oximetry, ordering and review of radiographic studies, ordering and review of laboratory studies and ordering and performing treatments and interventions        Labs Reviewed   CBC AND DIFF - Abnormal       Result Value    WBC 5.47      RBC 3.13 (*)     Hemoglobin 10.2 (*)     Hematocrit 31.8 (*)     .6 (*)     MCH 32.6      MCHC 32.1 (*)     RDW 18.8 (*)     Platelets 123 (*)     MPV 10.0      Differential Type Auto      nRBC 0.0      Immature Granulocytes 0.4      Neutrophils 75.1      Lymphocytes 11.7      Monocytes 7.1      Eosinophils 4.6      Basophils 1.1      Immature Granulocytes, Absolute 0.02      Neutrophils, Absolute 4.11      Lymphocytes, Absolute 0.64 (*)     Monocytes, Absolute 0.39      Eosinophils, Absolute 0.25      Basophils, Absolute 0.06     PROTIME-INR - Abnormal    PT 17.9 (*)     INR 1.5     BASIC METABOLIC PANEL - Abnormal    Sodium 141      Potassium 4.2      Chloride 110 (*)     CO2 23      BUN 26 (*)     Creatinine 1.7 (*)     Glucose 115 (*)     Calcium 8.5 (*)     eGFR 37.1 (*)     Anion Gap 8     CBC - Abnormal    WBC 4.83      RBC 2.79 (*)     Hemoglobin 9.2 (*)     Hematocrit 28.3 (*)     .4 (*)     MCH 33.0      MCHC 32.5      RDW 18.8  (*)     Platelets 103 (*)     MPV 10.0     PTT - Normal    PTT 32     TYPE AND SCREEN    Specimen Expiration 10/18/2024      Antibody Screen Negative      ABO O      Rh Factor Positive      History Check Previous type on file         No orders to display       Patient Vitals for the past 24 hrs:   BP Temp Temp src Pulse Resp SpO2 Height Weight   10/15/24 1529 (!) 106/58 -- -- 73 16 98 % -- --   10/15/24 1207 (!) 114/55 (!) 35.8 °C (96.5 °F) Temporal 89 16 98 % 1.829 m (6') 69.5 kg (153 lb 4.8 oz)           ED Course as of 10/15/24 1820   Tue Oct 15, 2024   1336 D/w surg []   1346 D/w surg  Call to GI []   1352 D/w GI []   1818 Admitting physician Dr. Suh evaluated pt and is concerned pt is bleeding into his ostomy bag on exam. Requesting stat CTA AP and repeat CBC which are now ordered.  [DO]      ED Course User Index  [] Gino Savage MD  [DO] Whit Alfaro PA C          BP (!) 106/58 (10/15/24 1529)    Temp      Pulse 73 (10/15/24 1529)   Resp 16 (10/15/24 1529)    SpO2 98 % (10/15/24 1529)        IMPRESSION  Final diagnoses:   [K92.2] Gastrointestinal hemorrhage, unspecified gastrointestinal hemorrhage type   [K94.11] Hemorrhage from enterostomy stoma (CMS/HCC)   [D69.6] Thrombocytopenia (CMS/[facility])   [D64.9] Anemia, unspecified type   [R71.0] Hemoglobin drop       PLAN     admit      -----------------------------  Gino Savage MD  10/15/2024 @ 4:19 PM  --------------------------------------------------------------------------------------------------     Gino Savage MD  10/15/24 1602       Gino Savage MD  10/15/24 1506

## 2024-10-15 NOTE — TELEPHONE ENCOUNTER
Received call from home health nurse, has concerns regarding pt stoma, says it looks extended and has blood.  Please call Betsy at [phone]

## 2024-10-15 NOTE — CONSULTS
Colon and Rectal Surgery Consult    Subjective     Samy Elena is a 89 y.o. 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 Bryant, CAD s/p CABG (1996) who presents to the ED with full time aid for passing blood clots through colostomy. Pt is not a great historian secondary to dementia. History primarily obtained from aid. Per aid, pt had some bright red blood stoma output yesterday however it was not significant and he had no further output until this am. This am, she noted his ostomy was more protuberant and soon after he passed a significant amount of clot. She has not noted any stool output since Saturday. States he complains of intermittent abdominal pain around his ostomy associated to stool passage. Aid does note that the stool sometimes tends to be harder in consistency. Recently the pain is more crampy and in the suprapubic region and she attributes this to his recent UTI. However no significant pain associated with passage of clot. She state he has had decreased appetite for past several weeks however no  nausea or vomiting. Denies any fever or chills, chest pain, SOB, dizziness. Pt is on plavix for hx of CAD s/p NTEMI (2021) and hx of TIA.     Pt has a hx of GIB in the past. Per chart review, he has had GIB in the past (2019, 2020) and was thought to be due to diverticular bleeding, prior to that he had rectal bleeding thought to be related to internal hemorrhoids. Follows GI at [facility]. No prior endoscopy reports on chart. Pt and aid do not know when the last endoscopy was.    In the ED, pt is afebrile and hemodynamically stable. Labs are remarkable for hgb of 9.2, Cr 1.7, plt 103, INR 1.5.     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    Atrial fibrillation (CMS/HCC)     CHF (congestive heart failure) (CMS/HCC)     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)     Coronary artery disease     Disease of thyroid gland     Will catheter in place     6/25 not at present    GI (gastrointestinal bleed)     Hypertension     Myocardial infarction (CMS/HCC)     Orthostatic hypotension     TIA (transient ischemic attack)        Surgical History:   Past Surgical History   Procedure Laterality Date    cataract extraction right eye with implant and limbal relaxing incision Right 7/18/2024    Performed by Hayder Moses MD at  [facility]    Cataract extraction w/ intraocular lens implant Left     Cataract extraction with LRI and anterior vitrectomy left eye Left 11/16/2023    Performed by Hayder Moses MD at  [facility]    Cholecystectomy      Colostomy      COLOSTOMY LAPAROSCOPIC N/A 8/3/2022    Performed by Mat Bryant MD at [facility] [place]    Coronary artery bypass graft      INCISION AND DRAINAGE WITH DEBRIDMENT OF BUTTOCK, AND PERINEUM N/A 8/2/2022    Performed by Mat Bryant MD at [facility] [place]    Joint replacement Left     Knee    RIGHT HEART CATH N/A 8/4/2022    Performed by Cristal Lacey MD at [facility] [place]ARDIAC CATH/EP    Thyroidectomy      WASHOUT OF PERINEAL WOUND, COLONIC LAVAGE N/A 8/3/2022    Performed by Mat Bryant MD at [facility] [place]       Social History:   Social History     Social History Narrative    Not on file       Family History: No family history on file.    Allergies: Jardiance [empagliflozin]    Home Medications:      Current Medications:  No current facility-administered medications for this encounter.     Current Outpatient Medications   Medication Sig Dispense Refill    bimatoprost (LUMIGAN) 0.01 % ophthalmic drops Administer 1 drop into the left eye nightly.      calcium carbonate (TUMS) 200 mg calcium (500 mg) chewable tablet Take 1 tablet by mouth 2 (two) times a day.      carboxymethylcellulose  (REFRESH PLUS) 0.5 % dropperette Administer 1 drop into both eyes 4 (four) times a day as needed for dry eyes.      clopidogreL (PLAVIX) 75 mg tablet Take 75 mg by mouth every morning.      cyanocobalamin (VITAMIN B12) 500 mcg tablet Take 500 mcg by mouth every morning.      escitalopram (LEXAPRO) 5 mg tablet Take 5 mg by mouth every morning.      famotidine (PEPCID) 10 mg tablet Take 1 tablet (10 mg total) by mouth daily Indications: gastroesophageal reflux disease. 30 tablet 0    finasteride (PROSCAR) 5 mg tablet Take 1 tablet (5 mg total) by mouth daily. 30 tablet 0    levothyroxine (SYNTHROID) 100 mcg tablet Take 100 mcg by mouth daily.      lidocaine (ASPERCREME) 4 % adhesive patch,medicated topical patch Apply 1 patch topically daily as needed. Remove & discard patch within 12 hours or as directed by prescriber.      melatonin 3 mg tablet Take 3 mg by mouth nightly.      methenamine (HIPREX) 1 gram tablet Take 1 g by mouth daily before lunch.      metoprolol succinate XL (TOPROL-XL) 25 mg 24 hr tablet Take 12.5 mg by mouth daily with dinner.      omeprazole (PriLOSEC) 20 mg capsule Take 20 mg by mouth daily with lunch.      potassium chloride (MICRO-K) 10 mEq CR capsule Take 10 mEq by mouth 2 (two) times a day.      tamsulosin (FLOMAX) 0.4 mg capsule Take 1 capsule (0.4 mg total) by mouth daily. 30 capsule 0    thiamine 50 mg tablet Take 50 mg by mouth daily.      tobramycin (TOBREX) 0.3 % ophthalmic solution Administer 1 drop into the right eye 3 (three) times a day.      torsemide (DEMADEX) 10 mg tablet Take 10 mg by mouth daily.         Review of Systems  All other systems reviewed and negative except as noted in the HPI.    Objective     Physicial Exam  Visit Vitals  BP (!) 106/58 (BP Location: Left upper arm, Patient Position: Lying)   Pulse 73   Temp (!) 35.8 °C (96.5 °F) (Temporal)   Resp 16   Ht 1.829 m (6')   Wt 69.5 kg (153 lb 4.8 oz)   SpO2 98%   BMI 20.79 kg/m²       General: frail, elderly, alert,  NAD  Head: NCAT  Neck: trachea midline, no JVD  Lungs: Normal respiratory effort, equal chest rise bilaterally  Heart: RRR  Abdomen: soft, NT, ND. LLQ colostomy with healthy, pink stoma and outputting blood clots. Healthy looking peristomal skin. The area around the stoma is soft, NT, no palpable lump. Protruding stoma however reducible.  Ext: no edema, FROM  Skin: warm and dry  Neuro: No focal deficits, CN II-XII grossly intact.    Labs  CBC Results         10/15/24 10/15/24 10/03/24     1528 1212 1200    WBC 4.83 5.47 5.24    RBC 2.79 3.13 3.12    HGB 9.2 10.2 10.2    HCT 28.3 31.8 33.1    .4 101.6 106.1    MCH 33.0 32.6 32.7    MCHC 32.5 32.1 30.8     123 148          CMP Results         10/15/24 10/03/24 09/26/24     1212 1200 0527     140 141    K 4.2 4.4 3.7    Cl 110 107 104    CO2 23 25 27    Glucose 115 111 148    BUN 26 29 35    Creatinine 1.7 1.6 1.7    Calcium 8.5 8.6 8.4    Anion Gap 8 8 10    AST -- 39 --    ALT -- 7 --    Albumin -- 3.2 --    EGFR 37.1 40.2 37.4           Comment for K at 1212 on 10/15/24: Results obtained on plasma. Plasma Potassium values may be up to 0.4 mEQ/L less than serum values. The differences may be greater for patients with high platelet or white cell counts.    Comment for EGFR at 1212 on 10/15/24: Calculation based on the Chronic Kidney Disease Epidemiology Collaboration (CKD-EPI) equation refit without adjustment for race.    Comment for EGFR at 1200 on 10/03/24: Calculation based on the Chronic Kidney Disease Epidemiology Collaboration (CKD-EPI) equation refit without adjustment for race.    Comment for EGFR at 0527 on 09/26/24: Calculation based on the Chronic Kidney Disease Epidemiology Collaboration (CKD-EPI) equation refit without adjustment for race.          PT PTT Results         10/15/24 09/24/24 07/31/24     1212 0938 1103    PT 17.9 19.5 18.0    INR 1.5 1.7 1.5    PTT 32 -- --           Comment for INR at 1212 on 10/15/24: Moderate  Intensity Anticoagulation = 2.0 to 3.0, High Intensity = 2.5 to 3.5    Comment for INR at 0938 on 09/24/24: Moderate Intensity Anticoagulation = 2.0 to 3.0, High Intensity = 2.5 to 3.5    Comment for INR at 1103 on 07/31/24: Moderate Intensity Anticoagulation = 2.0 to 3.0, High Intensity = 2.5 to 3.5              Imaging  Not applicable    Assessment     89 y/o M frail, elderly male with significant cardiac issues on plavix, hx of NSTI of the perineum in 08/2022 s/p I&D and colostomy presents to the ED with bloody stoma output.       Plan     -no acute surgical intervention at this time. The stoma is not incarcerated or strangulated. There is likely a parastomal hernia however he is not clinically obstructed with significant skin changes overlying the peristomal skin therefore low suspicion for incarcerated or strangulated parastomal hernia  -recommend GIB workup  -Consult GI for potential upper and lower endoscopy  -trend hgb, transfuse as needed  -hold antiplatelets  -pt's Cr is 1.7 with eGFR of 37. He does not seem to putting out blood clots at this time. Could hold off on CTA at this time as not actively bleeding  - if rebleeds, could consider CTA in a more urgent fashion  -dispo per ED    Case discussed with Dr. Bryant who is in agreement  Enrico Soto, DO  [id number]   Please page [phone] for further questions or concerns

## 2024-10-15 NOTE — PROGRESS NOTES
"Samy Jackson Kassy   [id number]    Your doctor has referred you for a CT examination that requires the injection of an iodinated contrast material into your bloodstream. This iodinated contrast material, sometimes referred to as \"x-ray dye\" allows for better interpretation of the x-ray films or CT images and results in a more accurate interpretation of the examination.     Without the use of iodinated contrast (x-ray dye), the examination may be less informative and result in a suboptimal examination, and possibly a delay in diagnosis and, if needed, treatment.     The iodinated contrast material is given through a small needle or catheter placed into a vein, usually on the inside of the elbow, on the back of hand, or in a vein in the foot or lower leg.    The most common, though still rare, potential reaction to an intravenous contrast injection is an allergic-like reaction. Most allergic-like reactions are mild, though a small subset of people can have more severe reactions. Mild reactions include mild / scattered hives, itching, scratchy throat, sneezing and nasal congestion. More severe reactions include facial swelling, severe difficulty breathing, wheezing and anaphylactic shock. Those with a prior history allergic-like reaction to the same class of contrast media (such as CT contrast or MRI contrast) are at the highest risk for an allergic reaction.     If you believe you had an allergic reaction to contrast in the past, please let our staff know. We can determine if this increases your risk for a future reaction and provide steps to decrease the risk. This may delay your examination, but it decreases the risk of having a new and possibly more severe reaction to the contrast injection.    People with a history of prior allergic reactions to other substances (such as unrelated medications and food) and patients with a history of asthma have slightly increased risk for an allergic reaction from contrast " material when compared with the general population, but do not require to be pretreated prior to a contrast injection.    You should notify the physician, nurse or technologist if you have ever had any of these conditions or if you have any questions.

## 2024-10-15 NOTE — TELEPHONE ENCOUNTER
Phone call to Betsy, patient's caregiver.  She changed ostomy appliance this morning because of leak and noticed large blood clots coming from ostomy.  The skin around ostomy site and ostomy itself does not seem to be bleeding, but passing large clots from bowel.  He has not been eating regularly.  Stool consistency has been watery to more formed, but varying.  Bleeding/clots not noticed until today.  No prolapse of the stoma noted today. No lightheadedness, dizziness, chest pain, shortness of breath. Home health was there this past Saturday.  WOCN unable to get to patient until 10/17/24.  Given possible GI bleed, recommend patient go to ER for further evaluation.

## 2024-10-15 NOTE — Clinical Note
Patient placed on procedure table in supine position with arms at side. Positioning devices: all pressure points padded, arm board under arms and safety strap applied. Clippers to hair right groin

## 2024-10-15 NOTE — Clinical Note
Pt arrived fro ER to IR with ostomy bag full of bright and clotted blood and leaking.Estimated 400cc. Bag removed and new ostomy bag applied prior to moving patient to IR procedure table

## 2024-10-15 NOTE — PROGRESS NOTES
Spoke with patient's son and patient's caregiver and confirmed with medication list from [facility] and/or patient's own pharmacy to complete the medication reconciliation.     Prior to admission medication list:    Current Outpatient Medications:     acetaminophen (TYLENOL) 650 mg 8 hr tablet, Take 1,300 mg by mouth every 8 (eight) hours as needed for pain., Disp: , Rfl:     cefuroxime (CEFTIN) 500 mg tablet, Take 500 mg by mouth 2 (two) times a day., Disp: , Rfl:     cranberry fruit concentrate (AZO CRANBERRY ORAL), Take 1 tablet by mouth daily., Disp: , Rfl:     bimatoprost (LUMIGAN) 0.01 % ophthalmic drops, Administer 1 drop into the left eye nightly., Disp: , Rfl:     calcium carbonate (TUMS) 200 mg calcium (500 mg) chewable tablet, Take 1 tablet by mouth 2 (two) times a day., Disp: , Rfl:     carboxymethylcellulose (REFRESH PLUS) 0.5 % dropperette, Administer 1 drop into both eyes 4 (four) times a day as needed for dry eyes., Disp: , Rfl:     clopidogreL (PLAVIX) 75 mg tablet, Take 75 mg by mouth every morning., Disp: , Rfl:     cyanocobalamin (VITAMIN B12) 500 mcg tablet, Take 500 mcg by mouth every morning., Disp: , Rfl:     escitalopram (LEXAPRO) 5 mg tablet, Take 5 mg by mouth every morning., Disp: , Rfl:     famotidine (PEPCID) 10 mg tablet, Take 1 tablet (10 mg total) by mouth daily Indications: gastroesophageal reflux disease., Disp: 30 tablet, Rfl: 0    finasteride (PROSCAR) 5 mg tablet, Take 1 tablet (5 mg total) by mouth daily., Disp: 30 tablet, Rfl: 0    levothyroxine (SYNTHROID) 100 mcg tablet, Take 100 mcg by mouth daily., Disp: , Rfl:     lidocaine (ASPERCREME) 4 % adhesive patch,medicated topical patch, Apply 1 patch topically daily as needed. Remove & discard patch within 12 hours or as directed by prescriber., Disp: , Rfl:     melatonin 3 mg tablet, Take 3 mg by mouth nightly., Disp: , Rfl:     methenamine (HIPREX) 1 gram tablet, Take 1 g by mouth daily before lunch., Disp: , Rfl:      metoprolol succinate XL (TOPROL-XL) 25 mg 24 hr tablet, Take 12.5 mg by mouth daily with dinner., Disp: , Rfl:     omeprazole (PriLOSEC) 20 mg capsule, Take 20 mg by mouth daily with lunch., Disp: , Rfl:     potassium chloride (MICRO-K) 10 mEq CR capsule, Take 10 mEq by mouth 2 (two) times a day., Disp: , Rfl:     tamsulosin (FLOMAX) 0.4 mg capsule, Take 1 capsule (0.4 mg total) by mouth daily., Disp: 30 capsule, Rfl: 0    thiamine 50 mg tablet, Take 100 mg by mouth daily., Disp: , Rfl:     tobramycin (TOBREX) 0.3 % ophthalmic solution, Administer 1 drop into the right eye 3 (three) times a day., Disp: , Rfl:     torsemide (DEMADEX) 10 mg tablet, Take 10 mg by mouth daily., Disp: , Rfl:        Comments about home medications:  -Patient is not taking mirtazapine.  -Patient's son states patient is taking potassium chloride 10 mEq capsules one capsule BID. Patient is not taking potassium chloride 20 mEq tablets despite recent fills in [facility].  -Patient did not finish taking Bactrim DS, this medication was changed to cefuroxime 500mg BID.      Compliance:   -methenamine 1g last filled 6/25/24 90 day supply.  -Recent fills on all other medications.

## 2024-10-16 ENCOUNTER — PATIENT OUTREACH (OUTPATIENT)
Dept: CASE MANAGEMENT | Facility: CLINIC | Age: 89
End: 2024-10-16
Payer: MEDICARE

## 2024-10-16 LAB
ANION GAP SERPL CALC-SCNC: 7 MEQ/L (ref 3–15)
BACTERIA URNS QL MICRO: ABNORMAL /HPF
BILIRUB UR QL STRIP.AUTO: NEGATIVE MG/DL
BUN SERPL-MCNC: 25 MG/DL (ref 7–25)
CALCIUM SERPL-MCNC: 7.8 MG/DL (ref 8.6–10.3)
CHLORIDE SERPL-SCNC: 112 MEQ/L (ref 98–107)
CLARITY UR REFRACT.AUTO: CLEAR
CO2 SERPL-SCNC: 23 MEQ/L (ref 21–31)
COLOR UR AUTO: YELLOW
CREAT SERPL-MCNC: 1.5 MG/DL (ref 0.7–1.3)
EGFRCR SERPLBLD CKD-EPI 2021: 43.1 ML/MIN/1.73M*2
ERYTHROCYTE [DISTWIDTH] IN BLOOD BY AUTOMATED COUNT: 18.7 % (ref 11.6–14.4)
ERYTHROCYTE [DISTWIDTH] IN BLOOD BY AUTOMATED COUNT: 18.8 % (ref 11.6–14.4)
ERYTHROCYTE [DISTWIDTH] IN BLOOD BY AUTOMATED COUNT: 19 % (ref 11.6–14.4)
ERYTHROCYTE [DISTWIDTH] IN BLOOD BY AUTOMATED COUNT: 19 % (ref 11.6–14.4)
GLUCOSE SERPL-MCNC: 98 MG/DL (ref 70–99)
GLUCOSE UR STRIP.AUTO-MCNC: NEGATIVE MG/DL
HCT VFR BLD AUTO: 27.1 % (ref 40.1–51)
HCT VFR BLD AUTO: 27.6 % (ref 40.1–51)
HCT VFR BLD AUTO: 27.9 % (ref 40.1–51)
HCT VFR BLD AUTO: 28.7 % (ref 40.1–51)
HGB BLD-MCNC: 8.5 G/DL (ref 13.7–17.5)
HGB BLD-MCNC: 8.7 G/DL (ref 13.7–17.5)
HGB BLD-MCNC: 8.8 G/DL (ref 13.7–17.5)
HGB BLD-MCNC: 9.1 G/DL (ref 13.7–17.5)
HGB UR QL STRIP.AUTO: NEGATIVE
HYALINE CASTS #/AREA URNS LPF: ABNORMAL /LPF
KETONES UR STRIP.AUTO-MCNC: NEGATIVE MG/DL
LEUKOCYTE ESTERASE UR QL STRIP.AUTO: 3
MCH RBC QN AUTO: 31.7 PG (ref 28–33.2)
MCH RBC QN AUTO: 32.2 PG (ref 28–33.2)
MCH RBC QN AUTO: 32.3 PG (ref 28–33.2)
MCH RBC QN AUTO: 32.4 PG (ref 28–33.2)
MCHC RBC AUTO-ENTMCNC: 30.8 G/DL (ref 32.2–36.5)
MCHC RBC AUTO-ENTMCNC: 31.5 G/DL (ref 32.2–36.5)
MCHC RBC AUTO-ENTMCNC: 31.7 G/DL (ref 32.2–36.5)
MCHC RBC AUTO-ENTMCNC: 32.1 G/DL (ref 32.2–36.5)
MCV RBC AUTO: 100.7 FL (ref 83–98)
MCV RBC AUTO: 101.4 FL (ref 83–98)
MCV RBC AUTO: 102.6 FL (ref 83–98)
MCV RBC AUTO: 103 FL (ref 83–98)
MUCOUS THREADS URNS QL MICRO: ABNORMAL /LPF
NITRITE UR QL STRIP.AUTO: NEGATIVE
PH UR STRIP.AUTO: 6 [PH]
PLATELET # BLD AUTO: 103 K/UL (ref 150–350)
PLATELET # BLD AUTO: 106 K/UL (ref 150–350)
PLATELET # BLD AUTO: 110 K/UL (ref 150–350)
PLATELET # BLD AUTO: 118 K/UL (ref 150–350)
PMV BLD AUTO: 10.1 FL (ref 9.4–12.4)
PMV BLD AUTO: 10.2 FL (ref 9.4–12.4)
PMV BLD AUTO: 9.9 FL (ref 9.4–12.4)
PMV BLD AUTO: 9.9 FL (ref 9.4–12.4)
POTASSIUM SERPL-SCNC: 4.4 MEQ/L (ref 3.5–5.1)
PROT UR QL STRIP.AUTO: 1
RBC # BLD AUTO: 2.68 M/UL (ref 4.5–5.8)
RBC # BLD AUTO: 2.69 M/UL (ref 4.5–5.8)
RBC # BLD AUTO: 2.72 M/UL (ref 4.5–5.8)
RBC # BLD AUTO: 2.83 M/UL (ref 4.5–5.8)
RBC #/AREA URNS HPF: ABNORMAL /HPF
SODIUM SERPL-SCNC: 142 MEQ/L (ref 136–145)
SP GR UR REFRACT.AUTO: >1.035
SQUAMOUS URNS QL MICRO: ABNORMAL /HPF
UROBILINOGEN UR STRIP-ACNC: 0.2 EU/DL
WBC # BLD AUTO: 4.35 K/UL (ref 3.8–10.5)
WBC # BLD AUTO: 4.9 K/UL (ref 3.8–10.5)
WBC # BLD AUTO: 5.2 K/UL (ref 3.8–10.5)
WBC # BLD AUTO: 5.26 K/UL (ref 3.8–10.5)
WBC #/AREA URNS HPF: ABNORMAL /HPF

## 2024-10-16 PROCEDURE — 85027 COMPLETE CBC AUTOMATED: CPT | Performed by: INTERNAL MEDICINE

## 2024-10-16 PROCEDURE — 96374 THER/PROPH/DIAG INJ IV PUSH: CPT | Mod: 59 | Performed by: INTERNAL MEDICINE

## 2024-10-16 PROCEDURE — 87181 SC STD AGAR DILUTION PER AGT: CPT | Performed by: PHYSICIAN ASSISTANT

## 2024-10-16 PROCEDURE — 63600000 HC DRUGS/DETAIL CODE: Mod: JZ | Performed by: INTERNAL MEDICINE

## 2024-10-16 PROCEDURE — 96376 TX/PRO/DX INJ SAME DRUG ADON: CPT | Performed by: INTERNAL MEDICINE

## 2024-10-16 PROCEDURE — 85027 COMPLETE CBC AUTOMATED: CPT | Performed by: PHYSICIAN ASSISTANT

## 2024-10-16 PROCEDURE — 99233 SBSQ HOSP IP/OBS HIGH 50: CPT | Performed by: INTERNAL MEDICINE

## 2024-10-16 PROCEDURE — 36415 COLL VENOUS BLD VENIPUNCTURE: CPT | Performed by: INTERNAL MEDICINE

## 2024-10-16 PROCEDURE — 81003 URINALYSIS AUTO W/O SCOPE: CPT | Performed by: INTERNAL MEDICINE

## 2024-10-16 PROCEDURE — 82310 ASSAY OF CALCIUM: CPT | Performed by: INTERNAL MEDICINE

## 2024-10-16 PROCEDURE — 63700000 HC SELF-ADMINISTRABLE DRUG: Performed by: INTERNAL MEDICINE

## 2024-10-16 PROCEDURE — G0378 HOSPITAL OBSERVATION PER HR: HCPCS

## 2024-10-16 PROCEDURE — 87086 URINE CULTURE/COLONY COUNT: CPT | Performed by: PHYSICIAN ASSISTANT

## 2024-10-16 PROCEDURE — 99232 SBSQ HOSP IP/OBS MODERATE 35: CPT | Performed by: COLON & RECTAL SURGERY

## 2024-10-16 RX ADMIN — PANTOPRAZOLE SODIUM 40 MG: 40 INJECTION, POWDER, LYOPHILIZED, FOR SOLUTION INTRAVENOUS at 11:12

## 2024-10-16 RX ADMIN — LATANOPROST 1 DROP: 50 SOLUTION/ DROPS OPHTHALMIC at 21:31

## 2024-10-16 RX ADMIN — LATANOPROST 1 DROP: 50 SOLUTION/ DROPS OPHTHALMIC at 00:07

## 2024-10-16 RX ADMIN — Medication 500 MCG: at 11:12

## 2024-10-16 RX ADMIN — FINASTERIDE 5 MG: 5 TABLET, FILM COATED ORAL at 11:12

## 2024-10-16 RX ADMIN — ESCITALOPRAM OXALATE 5 MG: 5 TABLET, FILM COATED ORAL at 11:12

## 2024-10-16 RX ADMIN — LIDOCAINE 4% 1 PATCH: 40 PATCH TOPICAL at 11:12

## 2024-10-16 RX ADMIN — PANTOPRAZOLE SODIUM 40 MG: 40 INJECTION, POWDER, LYOPHILIZED, FOR SOLUTION INTRAVENOUS at 21:31

## 2024-10-16 RX ADMIN — THIAMINE HCL TAB 100 MG 100 MG: 100 TAB at 11:12

## 2024-10-16 ASSESSMENT — COGNITIVE AND FUNCTIONAL STATUS - GENERAL
WALKING IN HOSPITAL ROOM: 3 - A LITTLE
MOVING TO AND FROM BED TO CHAIR: 3 - A LITTLE
STANDING UP FROM CHAIR USING ARMS: 3 - A LITTLE
CLIMB 3 TO 5 STEPS WITH RAILING: 2 - A LOT
STANDING UP FROM CHAIR USING ARMS: 3 - A LITTLE
CLIMB 3 TO 5 STEPS WITH RAILING: 2 - A LOT
MOVING TO AND FROM BED TO CHAIR: 3 - A LITTLE
WALKING IN HOSPITAL ROOM: 3 - A LITTLE

## 2024-10-16 NOTE — ASSESSMENT & PLAN NOTE
Last thoracentesis in 09/2024.  CT revealing moderate to large left pleural effusion.  Stable on RA.    -Monitor oxygen status closely  -Can consider repeat thoracentesis if having SOB

## 2024-10-16 NOTE — PLAN OF CARE
Plan of Care Review  Plan of Care Reviewed With: patient  Progress: improving  Outcome Evaluation: pt alert dx G.I bleed with colostomy  bag draining old blood, intially from the intervention radiology with clots , pt with no complain sof pain  , using the urinal , n.p.o from [place] plan to go for endoscopy, pt resting in bed call bell at reach

## 2024-10-16 NOTE — ASSESSMENT & PLAN NOTE
Recently started on course of  bactrim and changed to cefuroxime after home urine dip stick positive.  Denies dysuria.  UA abnormal.    -Urine cx growing ESBL E. Coli  -Hold on further antibx as is asymptomatic

## 2024-10-16 NOTE — UM PHYSICIAN REVIEW NOTE
Utilization Secondary Review Note      Patient Name: Samy Elena      MRN: [id number]    Inpatient level of care is appropriate for this patient admitted for bleeding from his ostomy who underwent IR embolization.  His Hgb and stoma continue to be monitored. Pt requires >2 MN of HLOC.    Primary service notified.      Sonya Maddox MD  10/16/2024

## 2024-10-16 NOTE — ASSESSMENT & PLAN NOTE
Cr of 1.7 upon admission appears at baseline ~1.6-1.7.  Cr below baseline.    -Will monitor Cr closely s/p CTA  -Avoid nephrotoxic agents and renally dose meds

## 2024-10-16 NOTE — PLAN OF CARE
Care Coordination Admission Assessment Note    General Information:  Readmission Within the last 30 days: previous discharge plan unsuccessful  Does patient have a :    Patient-Specific Goals (include timeframe):      Living Arrangements:  Arrived From: home  Current Living Arrangements: home  People in Home: other (see comments)  Home Accessibility: ramp to enter home  Living Arrangement Comments:      Housing Stability and Utility Access (SDOH):  In the last 12 months, was there a time when you were not able to pay the mortgage or rent on time?: No  In the past 12 months, how many times have you moved?:    At any time in the past 12 months, were you homeless or living in a shelter (including now)?: No  In the past 12 months has the electric, gas, oil, or water company threatened to shut off services in your home?: No    Functional Status Prior to Admission:   Assistive Device/Animal Currently Used at Home: wheelchair, ramps, walker, front-wheeled, walker, 4-wheeled, hospital bed  Functional Status Comments: generally WC-bound, able to ambulate short distances with walker and assist  IADL Comments:       Supports and Services:  Current Outpatient/Agency/Support Group:    Type of Current Home Care Services:    History of home care episode or rehab stay: current with [facility] and wishes to continue, has been to [facility]    Discharge Needs Assessment:   Concerns to be Addressed: denies needs/concerns at this time  Current Discharge Risk: chronically ill, dependent with mobility/activities of daily living  Anticipated Changes Related to Illness: inability to care for self    Patient/Family Anticipated Discharge Plan:  Patient/Family Anticipates Transition To: home with help/services  Patient/Family Anticipated Services at Transition: home health care    Connection to Community  Not applicable    Patient Choice:   Offered/Gave Vendor List: yes  Patient's Choice of  Community Agency(s): [facility]  Patient and/or patient guardian/advocate was made aware of their right to choose a provider. A list of eligible providers was presented and reviewed with the patient and/or patient guardian/advocate in written and/or verbal form. The list delineates providers in the patient’s desired geographic area who are participating in the Medicare program and/or providers contracted with the patient’s primary insurance. The Medicare list and quality ratings were obtained from the Medicare.gov [medicare.gov] website.    Anticipated Discharge Plan:  Met with patient. Provided education and contact information for Care Coordination services.: yes  Anticipated Discharge Disposition: home with home health  Type of Home Care Services: home PT, home OT, nursing, home health aide    Transportation Needs (SDOH):  Transportation Concerns:    Transportation Anticipated: family or friend will provide  Is Out of Hospital DNR needed at discharge?:      In the past 12 months, has lack of transportation kept you from medical appointments or from getting medications?: No  In the past 12 months, has lack of transportation kept you from meetings, work, or from getting things needed for daily living?: No    Concerns - comments: Spoke with patient's son, Marko [phone], to discuss potential d/c needs. Patient lives with 24/7 private caregivers, also is current with [facility] for PT/RN and wishes to continue. WC-bound but able to take a few steps with assist and walker. Verified demographics. His plan is for home, family usually transports.

## 2024-10-16 NOTE — POST-PROCEDURE NOTE
Interventional Radiology Brief Postprocedure Note    Samy Elena     Attending: Brian Mcgarry MD     Assistant:      Diagnosis: Active GI bleed     Description of procedure: Subselective SMA arteriography, subselective MARIE arteriography, wire dissection at the origin of the sigmoid artery     Contrast: 20 cc, Iso 300     Anesthesia:  Local (Lidocaine)    Volume of Lidocaine Utilized (ml): 5     Medications:       Complications: None      Estimated Blood Loss: Estimated Blood Loss: 0-10 ml    Anticoagulation:      Specimens:      Findings: Successful subselective SMA and MARIE arteriography without evidence of discrete active bleed or PSA. Successful wire dissection at the origin of the  sigmoidal artery, accounting for the territory of the bleed.  This should serve to limit flow to the distal sigmoid artery branches and control the bleed over the next 24-48 hours. Further coil embolization was not pursued. R CFA access - closure with Mynx. Following the procedure, the patient's bleeding into the colostomy bag notably decreased significantly. Pt denied any abdominal pain.    10/15/2024 10:10 PM

## 2024-10-16 NOTE — CONSULTS
Gastroenterology  Consultation Note       REASON FOR CONSULT   BRB from ostomy     HISTORY OF PRESENT ILLNESS      This is a 89 y.o. 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    Patient's caregiver around 9 AM yesterday noticed there was a large amount of dark clotted blood oozing from his stoma and that the stoma was protruding outward.  He had associated mild dizziness and lightheaded when sitting up, was denying any chest pain or shortness of breath.  He was brought to the ED and subsequently had passed large volume clots in the stoma bag.  He was recently started on cefuroxime for UTI but no other recent medications.  This is never happened to him before which was concerning for his caregiver prompting his arrival to the ED.    In the ED, vitals notable for heart rate 89, blood pressure 114/55 satting 98% on room air.  Labs notable for movement of 10.2, Creatinine 1.7, BUN 26 INR 1.5.  Surgery had evaluated the patient and stated there was  Likely no issues with his stoma likely no issues with his stoma and this is related more to lower GI bleeding.  Subsequently overnight patient had large-volume bright red blood from ostomy prompting CTA.  CTA head revealed acute GI bleed within the colon just proximal to the left lower quadrant colostomy and moderate to large left pleural effusion.  Patient subsequently underwent SMA arteriography and MARIE arteriography with successful wire dissection at origin of sigmoidal artery.  Since his procedure overnight his ostomy blood output has significantly decreased and as of this morning has only small amount darker liquid blood ostomy less than 50  cc total.        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)     Coronary artery disease     Disease of thyroid gland     Will catheter in place     6/25 not at present    GI (gastrointestinal bleed)     Hypertension     Myocardial infarction (CMS/HCC)     Orthostatic hypotension     TIA (transient ischemic attack)        PSHx:  Past Surgical History   Procedure Laterality Date    cataract extraction right eye with implant and limbal relaxing incision Right 7/18/2024    Performed by Hayder Moses MD at  OR [facility]    Cataract extraction w/ intraocular lens implant Left     Cataract extraction with LRI and anterior vitrectomy left eye Left 11/16/2023    Performed by Hayder Moses MD at  OR [facility]    Cholecystectomy      Colostomy      COLOSTOMY LAPAROSCOPIC N/A 8/3/2022    Performed by Mat Bryant MD at [facility] OR    Coronary artery bypass graft      INCISION AND DRAINAGE WITH DEBRIDMENT OF BUTTOCK, AND PERINEUM N/A 8/2/2022    Performed by Mat Bryant MD at [facility] OR    Joint replacement Left     Knee    RIGHT HEART CATH N/A 8/4/2022    Performed by Cristal Lacey MD at [facility] CARDIAC CATH/EP    Thyroidectomy      WASHOUT OF PERINEAL WOUND, COLONIC LAVAGE N/A 8/3/2022    Performed by Mat Bryant MD at [facility] OR       PCP:   Zachery Hernandez MD    MEDICATIONS      Medications Prior to Admission   Medication Sig Dispense Refill Last Dose/Taking    bimatoprost (LUMIGAN) 0.01 % ophthalmic drops Administer 1 drop into the left eye nightly.   10/14/2024    carboxymethylcellulose (REFRESH PLUS) 0.5 % dropperette Administer 1 drop into both eyes 4 (four) times a day as needed for dry eyes.   Past Week    cefuroxime (CEFTIN) 500 mg tablet Take 500 mg by mouth 2 (two) times a day.   10/14/2024     clopidogreL (PLAVIX) 75 mg tablet Take 75 mg by mouth every morning.   10/14/2024    cyanocobalamin (VITAMIN B12) 500 mcg tablet Take 500 mcg by mouth every morning.   10/14/2024    escitalopram (LEXAPRO) 5 mg tablet Take 5 mg by mouth every morning.   10/14/2024    famotidine (PEPCID) 10 mg tablet Take 1 tablet (10 mg total) by mouth daily Indications: gastroesophageal reflux disease. 30 tablet 0 10/14/2024    finasteride (PROSCAR) 5 mg tablet Take 1 tablet (5 mg total) by mouth daily. 30 tablet 0 10/14/2024    levothyroxine (SYNTHROID) 100 mcg tablet Take 100 mcg by mouth daily.   10/15/2024    methenamine (HIPREX) 1 gram tablet Take 1 g by mouth daily before lunch.   10/14/2024    metoprolol succinate XL (TOPROL-XL) 25 mg 24 hr tablet Take 12.5 mg by mouth daily with dinner.   10/14/2024    tamsulosin (FLOMAX) 0.4 mg capsule Take 1 capsule (0.4 mg total) by mouth daily. 30 capsule 0 10/14/2024    thiamine 50 mg tablet Take 100 mg by mouth daily.   10/14/2024    torsemide (DEMADEX) 10 mg tablet Take 10 mg by mouth daily.   10/14/2024       Home medications were personally reviewed.    ALLERGIES      Jardiance [empagliflozin]    FAMILY HISTORY      No family history on file.    SOCIAL HISTORY      Social History     Socioeconomic History    Marital status:      Spouse name: None    Number of children: None    Years of education: None    Highest education level: None   Tobacco Use    Smoking status: Never    Smokeless tobacco: Never   Vaping Use    Vaping status: Never Used   Substance and Sexual Activity    Alcohol use: Not Currently     Comment: social    Drug use: Never    Sexual activity: Defer     Social Drivers of Health     Financial Resource Strain: Low Risk  (8/4/2023)    Overall Financial Resource Strain (CARDIA)     Difficulty of Paying Living Expenses: Not hard at all   Food Insecurity: No Food Insecurity (10/15/2024)    Hunger Vital Sign     Worried About Running Out of Food in the Last Year:  Never true     Ran Out of Food in the Last Year: Never true   Transportation Needs: No Transportation Needs (9/26/2024)    PRAPARE - Transportation     Lack of Transportation (Medical): No     Lack of Transportation (Non-Medical): No   Housing Stability: Low Risk  (9/26/2024)    Housing Stability Vital Sign     Unable to Pay for Housing in the Last Year: No     Number of Times Moved in the Last Year: 0     Homeless in the Last Year: No       REVIEW OF SYSTEMS      14 point ROS negative unless stated per HPI    PHYSICAL EXAMINATION      Temp:  [35.8 °C (96.5 °F)-36.6 °C (97.9 °F)] 36.6 °C (97.9 °F)  Heart Rate:  [66-89] 86  Resp:  [16-26] 16  BP: (101-131)/(55-70) 102/59  Body mass index is 20.79 kg/m².    General appearance - alert, no apparent distress  Eyes - pupils equal and reactive, extraocular eye movements intact, sclera anicteric  Mouth - mucous membranes moist, pharynx normal without lesions  Chest - no evidence of respiratory distress  Heart -tachycardic  Abdomen - soft, nontender, nondistended.  Ostomy in place without signs of ischemia, mild dark red liquid blood in ostomy, approximately less than 20 cc  Skin - normal coloration and turgor, no rashes, no suspicious skin lesions noted      LABS / IMAGING / EKG        Labs  Reviewed.  Results from last 7 days   Lab Units 10/16/24  0520 10/15/24  1212   SODIUM mEQ/L 142 141   POTASSIUM mEQ/L 4.4 4.2   CHLORIDE mEQ/L 112* 110*   CO2 mEQ/L 23 23   BUN mg/dL 25 26*   CREATININE mg/dL 1.5* 1.7*   CALCIUM mg/dL 7.8* 8.5*   GLUCOSE mg/dL 98 115*         Results from last 7 days   Lab Units 10/16/24  0520 10/15/24  2345 10/15/24  1823 10/15/24  1528 10/15/24  1212   WBC K/uL 5.26 4.35 4.49   < > 5.47   HEMOGLOBIN g/dL 8.5* 9.1* 9.1*   < > 10.2*   HEMATOCRIT % 27.6* 28.7* 27.8*   < > 31.8*   PLATELETS K/uL 110* 103* 108*   < > 123*   DIFF TYPE   --   --   --   --  Auto   NRBC %  --   --   --   --  0.0   IMM GRANULOCYTES %  --   --   --   --  0.4   NEUTROPHILS %  --    --   --   --  75.1   LYMPHOCYTES %  --   --   --   --  11.7   MONOCYTES %  --   --   --   --  7.1   EOSINOPHILS %  --   --   --   --  4.6   BASOPHILS %  --   --   --   --  1.1   IMM GRANUCOCYTES ABS K/uL  --   --   --   --  0.02   MONO ABS AUTO K/uL  --   --   --   --  0.39   EOS ABS AUTO K/uL  --   --   --   --  0.25   BASO ABS AUTO K/uL  --   --   --   --  0.06    < > = values in this interval not displayed.         Imaging  Reviewed.    ASSESSMENT AND PLAN        #Bright red blood per stoma  Patient presents with 1 day history of blood from stoma, now status post IR embolization of sigmoidal artery.  Patient is doing well postprocedure and has diminishing output of blood via ostomy.  Hemoglobin downtrending from yesterday prior to blood loss, no active concern for continuous would monitor hemoglobin very closely manage symptomatically no initial indications for endoscopy after embolization.  -Monitor hemoglobin, transfuse < 7  -No current indications for colonoscopy given recent embolization overnight  -If recurrent large-volume bleeding, consider repeat CTA for further embolization.    GI will sign off    Please see attending attestation for final recommendations.    Vern Campbell MD  Gastroenterology Fellow PGY-4  Pager #[phone]       VTE Assessment: [place]    Code Status: DNR (A.N.D.)  Estimated discharge date: 10/16/2024

## 2024-10-16 NOTE — ASSESSMENT & PLAN NOTE
"With history of recurrent diverticular and hemorrhoidal bleeds, not on AC but on plavix at home  Two different bleeds this admission  -initially had bleed in ostomy which resolved, then with rectal bleeding  -patient has end colostomy w/ rectal stump (transverse colon was divided) - this represents two separate bleeds    Ostomy bleed: resolved  CTA revealing acute GI bleed within the colon just proximal to the left lower quadrant colostomy.  IR performed successful subselective SMA and MARIE arteriography without evidence of discrete active bleed or pseudoaneurysm. Successful wire dissection at the origin of the sigmoidal artery, accounting for the territory of the bleed.   Suspect diverticular bleed.  10/17: Rebleed into the ostomy, darker red slow ooze/trickle with clots. Hemodynamically stable.  CTA with \"Active GI bleed is noted involving the colon just proximal to the colostomy. This is similar location to the prior study however is not as pronounced.\"  Discussed with both GI and IR. No IR intervention recommended as the site is close to the ostomy and does not appear to be from an arterial source. GI in agreement.   S/p colonoscopy through ostomy 10/18: noted diverticuli and 2 polyps which were small and not removed.     Rectal bleed - noted 10/21  -Plavix was resumed and planned for dc but had dark red blood clots per rectum 10/21 AM   -bleeding had sopped but recurrent 10/22, hgb dropped from 7.9 to 5.8 and responded to 2U PRBCs  -flex sig done 10/23 w/ extensive diverticulosis and visualized clots - suspect diverticular source  -per GI if significant rebleed order CTA and consult IR  -resumed metoprolol and diuretic    -had total 4U PRBCs this admission  -ordered IV iron, advise oral iron on discharge  -continue to hold plavix on dc, if no bleeding for 7 days can trial aspirin 81mg given hx CABG though possible he will not tolerate this, discussed with patient that he will need to follow up with his " cardiologist to discuss further  Consult palliative

## 2024-10-16 NOTE — PROGRESS NOTES
Presenting Diagnosis: GI bleeding [K92.2]  Thrombocytopenia (CMS/HCC) [D69.6]  Hemoglobin drop [R71.0]  Hemorrhage from enterostomy stoma (CMS/HCC) [K94.11]  Anemia, unspecified type [D64.9]  Gastrointestinal hemorrhage, unspecified gastrointestinal hemorrhage type [K92.2]    HPI  Patient is a 89 y.o. male seen at bedside day 1 s/p embolization in IR for GI bleed. Patient reports feeling well. Denies abd pain or groin pain. No complaints.    Vitals:    10/16/24 0400 10/16/24 0521 10/16/24 0755 10/16/24 1127   BP: 101/62  (!) 102/59 111/71   BP Location: Right upper arm  Right upper arm Right upper arm   Patient Position: Lying  Lying Lying   Pulse: 66 69 86 76   Resp: 18  16 16   Temp: 36.4 °C (97.5 °F)  36.6 °C (97.9 °F) 36.4 °C (97.5 °F)   TempSrc: Oral  Oral Axillary   SpO2: 94%  98% 97%   Weight:       Height:            Exam  Gen: sleeping, nad  Abd: soft, nt, nd. Scant bloody drainage noted in colostomy bag.   Msk: R groin access site dressing c/d/I and without ttp or swelling.    CBC Results         10/16/24 10/15/24 10/15/24     0520 2345 1823    WBC 5.26 4.35 4.49    RBC 2.68 2.83 2.76    HGB 8.5 9.1 9.1    HCT 27.6 28.7 27.8    .0 101.4 100.7    MCH 31.7 32.2 33.0    MCHC 30.8 31.7 32.7     103 108             Interventional Radiology Brief Postprocedure Note     Samy Elena      Attending: Brian Mcgarry MD                Assistant:       Diagnosis: Active GI bleed      Description of procedure: Subselective SMA arteriography, subselective MARIE arteriography, wire dissection at the origin of the sigmoid artery      Contrast: 20 cc, Iso 300      Anesthesia:  Local (Lidocaine)     Volume of Lidocaine Utilized (ml): 5        Medications:        Complications: None                            Estimated Blood Loss: Estimated Blood Loss: 0-10 ml     Anticoagulation:       Specimens:       Findings: Successful subselective SMA and MARIE arteriography without evidence of discrete active bleed  or PSA. Successful wire dissection at the origin of the  sigmoidal artery, accounting for the territory of the bleed.  This should serve to limit flow to the distal sigmoid artery branches and control the bleed over the next 24-48 hours. Further coil embolization was not pursued. R CFA access - closure with Mynx. Following the procedure, the patient's bleeding into the colostomy bag notably decreased significantly. Pt denied any abdominal pain.     10/15/2024 10:10 PM      Assessment/Plan : 89 y/o male seen at bedside day 1 s/p embolization in IR for GI bleed. Hgb stable. VSS. Pt well appearing. Admitting team to continue to monitor.     Time spent with patient/chartin minutes

## 2024-10-16 NOTE — ASSESSMENT & PLAN NOTE
CT with incidental finding of age-indeterminate L1 vertebral body compression fracture, new since July 2024.  Pain is minimal.    -Pain control with lidocaine patch and PRN acetaminophen   -PT/OT rec home

## 2024-10-16 NOTE — PROGRESS NOTES
[facility] (General Surgery/M[facility]/Breast/[facility]) Pager: [phone]     Progress Note    Subjective   89 y.o. M with colostomy ([facility]) for diversion s/p I&D for Fornier's gangrene (2022) c/f bloody stoma output    Interval History:   Vital signs stable.  Hemoglobin stable, 9.1 at 6 PM last night and 9.1 at recheck around midnight.  Patient required no blood transfusions overnight.  Patient underwent CT scan and active bleeding was noted in the colon immediately proximal to the ostomy.  Patient went for IR selective angiography of SMA and MARIE with no active bleeding noted.  IR did raise the dissection flap at the origin of the sigmoidal artery to limit blood flow.      This morning patient feels well. He is not having any dizziness weakness or lightheadedness. Patient has liquid dark blood in his ostomy bag.  No more florencio clots.  He has been NPO.  Has been able to urinate.  Has not been out of bed.  Denies chest pain shortness of breath.      Objective     Vitals:    10/16/24 0521   BP:    Pulse: 69   Resp:    Temp:    SpO2:          Intake/Output Summary (Last 24 hours) at 10/16/2024 0611  Last data filed at 10/15/2024 2314  Gross per 24 hour   Intake --   Output 550 ml   Net -550 ml       I/O this shift:  In: -   Out: 550 [Stool:550]    General: Well appearing no acute distress  HEENT: Normocephalic, trachea midline, no JVD  CV: HR 69 /62  Pulm: Unlabored respirations, sating 94% on room air  Abd: Soft, nontender nondistended, ostomy bag in place in the left lower quadrant with dark red liquidy stool.  Skin: Normal color, normal turgur, no diaphoresis  Extr: Warm and well perfused  Neuro: Grossly intact, responds to questions appropriately  LDA: Ostomy      Labs:  Results from last 7 days   Lab Units 10/15/24  2345 10/15/24  1823 10/15/24  1528   WBC K/uL 4.35 4.49 4.83   HEMOGLOBIN g/dL 9.1* 9.1* 9.2*   HEMATOCRIT % 28.7* 27.8* 28.3*   PLATELETS K/uL 103* 108* 103*     Results from last 7 days   Lab  Units 10/15/24  1212   SODIUM mEQ/L 141   POTASSIUM mEQ/L 4.2   CHLORIDE mEQ/L 110*   CO2 mEQ/L 23   BUN mg/dL 26*   CREATININE mg/dL 1.7*   CALCIUM mg/dL 8.5*   GLUCOSE mg/dL 115*     Recent Labs   Lab 10/15/24  1212 09/24/24  0938   INR 1.5 1.7   PTT 32  --            Imaging:  I have reviewed the Imaging from the last 24 hrs.  IR EMBOLIZATION    Result Date: 10/15/2024  CLINICAL HISTORY:    Active GI bleed localized to sigmoid colon near the left lower quadrant colostomy.     IMPRESSION:   Successful subselective SMA and MARIE arteriography without evidence of discrete active bleed or pseudoaneurysm. Successful wire dissection was achieved, at the origin of the proximal sigmoid artery, accounting for the region of bleed noted on CTA. This should serve to limit flow to the distal sigmoid artery branch and allow for healing of a diverticular bleed over the next 24 to 48 hours. Further coil embolization was therefore not pursued. Following the procedure, the patient's bleeding into his colostomy bag notably decreased significantly. The patient denied any abdominal pain following the procedure. COMMENT: PROCEDURE: 1. Ultrasound-guided access of the right common femoral artery. 2. Subselective SMA arteriography. 3. Subselective MARIE arteriography. 4. Superselective sigmoid arteriography with wire dissection. 5. Arterial closure, utilizing a Mynx device. RADIOLOGIST: Brian Mcgarry MD ANESTHESIA:  Local 1% lidocaine. REFERENCE AIR KERMA: 114 mGy CONTRAST: 30  cc of Isovue 300 MEDICATIONS:  none DESCRIPTION OF PROCEDURE:    Written informed consent was obtained following explanation of risks and benefits of the procedure to the patient's son/POA. The patient was placed supine on the IR procedure table. Ultrasound-guided vascular access - The right groin was prepped and draped in the usual sterile fashion. Grayscale ultrasound evaluation demonstrated the right common femoral artery to be patent. Under real-time ultrasound  guidance, the right common femoral artery was accessed with a 21-gauge needle. An ultrasound-guided access image was saved to PACS. An 018 wire was advanced centrally. Sequential exchange was made for a 5 [place]er microcatheter sheath, 035 wire, and 5 [place] vascular sheath. The vascular sheath was connected to a pressurized saline flush system. Performance Met:  PQRS MEASURE 76, CPT 6030F:  All elements of maximal sterile technique were utilized, including cap, mask, sterile gown, sterile gloves, and sterile drape.  Additionally, sterile ultrasound techniques were followed, including use of sterile probe cover and sterile ultrasound gel. Utilizing a Contra 2 catheter, the SMA was subselected and subselective SMA arteriography was performed. There is no evidence of active bleed or pseudoaneurysm. Utilizing a Contra 2, the MARIE was then subselected and subselective MARIE arteriography was performed. There is no evidence of active bleed or pseudoaneurysm. In correlating with the recent CTA, the territory of bleed correlated with the sigmoidal artery. Therefore, utilizing Progreat microcatheter with double angle GT microwire, the proximal sigmoidal artery was subselected. Notably, due to the patient's vascular tortuosity and vascular fragility, spasm/wire dissection of the proximal sigmoidal artery was achieved. Therefore, as this should serve as a temporary embolization of the proximal sigmoid artery and allow for healing of the diverticular bleed. Therefore, further coil embolization was not pursued. Arterial closure and hemostasis was achieved, utilizing a Mynx device. Dry sterile dressing was applied. Notably, at the conclusion of the procedure, there was significantly decreased blood within the patient's colostomy bag, compatible with a successful procedure. The patient denied any abdominal pain at the conclusion of the procedure. The number of guidewires used, and their integrity, was confirmed at the end of the  procedure.    ULTRASOUND GUIDED VASCULAR ACCESS    Result Date: 10/15/2024  CLINICAL HISTORY:    Active GI bleed localized to sigmoid colon near the left lower quadrant colostomy.     IMPRESSION:   Successful subselective SMA and MARIE arteriography without evidence of discrete active bleed or pseudoaneurysm. Successful wire dissection was achieved, at the origin of the proximal sigmoid artery, accounting for the region of bleed noted on CTA. This should serve to limit flow to the distal sigmoid artery branch and allow for healing of a diverticular bleed over the next 24 to 48 hours. Further coil embolization was therefore not pursued. Following the procedure, the patient's bleeding into his colostomy bag notably decreased significantly. The patient denied any abdominal pain following the procedure. COMMENT: PROCEDURE: 1. Ultrasound-guided access of the right common femoral artery. 2. Subselective SMA arteriography. 3. Subselective MARIE arteriography. 4. Superselective sigmoid arteriography with wire dissection. 5. Arterial closure, utilizing a Mynx device. RADIOLOGIST: Brian Mcgarry MD ANESTHESIA:  Local 1% lidocaine. REFERENCE AIR KERMA: 114 mGy CONTRAST: 30  cc of Isovue 300 MEDICATIONS:  none DESCRIPTION OF PROCEDURE:    Written informed consent was obtained following explanation of risks and benefits of the procedure to the patient's son/POA. The patient was placed supine on the IR procedure table. Ultrasound-guided vascular access - The right groin was prepped and draped in the usual sterile fashion. Grayscale ultrasound evaluation demonstrated the right common femoral artery to be patent. Under real-time ultrasound guidance, the right common femoral artery was accessed with a 21-gauge needle. An ultrasound-guided access image was saved to PACS. An 018 wire was advanced centrally. Sequential exchange was made for a 5 [place] microcatheter sheath, 035 wire, and 5 [place] vascular sheath. The vascular sheath was  connected to a pressurized saline flush system. Performance Met:  PQRS MEASURE 76, CPT 6030F:  All elements of maximal sterile technique were utilized, including cap, mask, sterile gown, sterile gloves, and sterile drape.  Additionally, sterile ultrasound techniques were followed, including use of sterile probe cover and sterile ultrasound gel. Utilizing a Contra 2 catheter, the SMA was subselected and subselective SMA arteriography was performed. There is no evidence of active bleed or pseudoaneurysm. Utilizing a Contra 2, the MARIE was then subselected and subselective MARIE arteriography was performed. There is no evidence of active bleed or pseudoaneurysm. In correlating with the recent CTA, the territory of bleed correlated with the sigmoidal artery. Therefore, utilizing Progreat microcatheter with double angle GT microwire, the proximal sigmoidal artery was subselected. Notably, due to the patient's vascular tortuosity and vascular fragility, spasm/wire dissection of the proximal sigmoidal artery was achieved. Therefore, as this should serve as a temporary embolization of the proximal sigmoid artery and allow for healing of the diverticular bleed. Therefore, further coil embolization was not pursued. Arterial closure and hemostasis was achieved, utilizing a Mynx device. Dry sterile dressing was applied. Notably, at the conclusion of the procedure, there was significantly decreased blood within the patient's colostomy bag, compatible with a successful procedure. The patient denied any abdominal pain at the conclusion of the procedure. The number of guidewires used, and their integrity, was confirmed at the end of the procedure.    CT ANGIOGRAPHY ABDOMEN PELVIS WITH AND WITHOUT IV CONTRAST    Result Date: 10/15/2024  CLINICAL HISTORY:   GI bleed COMMENT: Technique: Initially, noncontrast images of the abdomen, and pelvis were obtained. CT angiography of the abdomen and pelvis was performed in the arterial phase.  Delayed images were obtained. Images were acquired after the uneventful intravenous administration of contrast.  Enteric contrast was not administered. Coronal and sagittal reformatted images were obtained. 3D MIP and/or volume rendered image reconstruction performed and reviewed. 100 mL of Isovue-370 was administered. CT DOSE:  One or more dose reduction techniques (e.g. automated exposure control, adjustment of the mA and/or kV according to patient size, use of iterative reconstruction technique) utilized for this examination. Comparisons studies:   CT abdomen and pelvis 7/20/2024. Vascular findings: Patent abdominal aorta, which is within normal limits in caliber. Aortic atherosclerotic disease without flow-limiting stenosis. Patent celiac, SMA, MARIE, and bilateral renal arteries. No flow-limiting iliac arterial stenosis or aneurysm. Lower chest: Cardiomegaly. Dilated IVC with reflux of contrast into the IVC, likely reflecting elevated right heart pressures. Moderate to large left pleural effusion. Small right pleural effusion. Liver: Within normal limits. Gallbladder: Cholecystectomy. Pancreas: Within normal limits. Spleen: Within normal limits. Adrenals: Within normal limits. Kidneys/ureter/bladder: Bilateral renal cysts. Reproductive organs: Enlarged prostate. Bowel: Left lower quadrant colostomy. Active GI bleed involving the colon just proximal to the colostomy, possibly diverticular in etiology. Colonic diverticulosis. Peritoneum: No ascites, free air, or fluid collection. Mesentery and lymph nodes: Within normal limits. Bones: Age-indeterminate L1 vertebral body compression fracture, new since the prior study from July 2024.     IMPRESSION: 1. Acute GI bleed within the colon just proximal to the left lower quadrant colostomy. 2. Age-indeterminate L1 vertebral body compression fracture, new since July 2024. Recommend correlation with acute back pain. 3. Moderate to large left pleural effusion. Finding:     Active GI bleeding   Acuity: Critical  Status:  CLOSED Critical read back was performed and results were read back by Dr. Suh on 10/15/2024 at 7:27 PM.      Assessment:  89 y.o. M with colostomy ([facility]) for diversion s/p I&D for Fornier's gangrene (2022) c/f bloody stoma output.  Patient is status post IR dissection flap of sigmoidal artery.  Hemoglobin appears to be stable vital signs stable will continue to monitor.    Plan:  - Recommend GI evaluation  - Continue to monitor hemoglobin and vitals  - No acute surgical intervention indicated at this time  - Further care per primary    Leo Patton MD  House Staff  General Surgery  Pager: [phone] [facility]

## 2024-10-16 NOTE — ASSESSMENT & PLAN NOTE
S/p CABG.    -Holding home Plavix in setting of second GI bleed this admission   -discussed starting asa 81mg if no further bleeding for 48hrs  -discussed need to follow up with cardiology - patient states he has an appointment in the next two weeks  -Does not appear to be on a statin

## 2024-10-16 NOTE — PROGRESS NOTES
Hospital Medicine     Daily Progress Note       SUBJECTIVE     Patient seen and examined, no acute events overnight.  Awake and alert, oriented.  Resting comfortably in bed, well saturated on room air.    He feels good and has no complaints.  Interested in and excited about getting a diet today.  Denies lightheadedness/dizziness, chest pain, shortness of breath, abdominal pain, nausea.  No new blood in his ostomy bag.    Had a detailed discussion with patient's son Marko via phone, provided medical update and addressed all questions and concerns.     OBJECTIVE      Vital signs in last 24 hours:  Temp:  [36.2 °C (97.1 °F)-36.6 °C (97.9 °F)] 36.4 °C (97.5 °F)  Heart Rate:  [66-86] 72  Resp:  [16-26] 16  BP: (101-131)/(56-71) 111/71    Intake/Output Summary (Last 24 hours) at 10/16/2024 1510  Last data filed at 10/16/2024 0600  Gross per 24 hour   Intake --   Output 925 ml   Net -925 ml       PHYSICAL EXAMINATION      Physical Exam  Vitals and nursing note reviewed.   Constitutional:       General: He is not in acute distress.     Appearance: He is not ill-appearing.      Comments: Awake and alert.  Thin and frail appearing.  Pleasant demeanor.   HENT:      Head: Normocephalic and atraumatic.      Nose: Nose normal.      Mouth/Throat:      Mouth: Mucous membranes are moist.   Eyes:      Pupils: Pupils are equal, round, and reactive to light.   Cardiovascular:      Rate and Rhythm: Normal rate and regular rhythm.      Heart sounds: Normal heart sounds.   Pulmonary:      Effort: Pulmonary effort is normal. No respiratory distress.      Breath sounds: Normal breath sounds. No stridor. No wheezing.   Abdominal:      General: Bowel sounds are normal. There is no distension.      Palpations: Abdomen is soft.      Tenderness: There is no abdominal tenderness.      Comments: Ostomy present, pink and patent.  Scant amount of residual bloody discharge, no acute bleeding.   Musculoskeletal:      Right lower leg: No edema.       Left lower leg: No edema.   Skin:     General: Skin is warm and dry.   Neurological:      Mental Status: He is alert.   Psychiatric:         Mood and Affect: Mood normal.         Behavior: Behavior normal.         Thought Content: Thought content normal.            LINES, CATHETERS, DRAINS, AIRWAYS, AND WOUNDS   Lines, Drains, and Airways:  Wounds (agree with documentation and present on admission):  Peripheral IV (Adult) 10/15/24 Left Antecubital (Active)   Number of days: 1       Peripheral IV (Adult) 10/15/24 Right Antecubital (Active)   Number of days: 1         Comments:    LABS / IMAGING / TELE      Labs  Results from last 7 days   Lab Units 10/16/24  0520 10/15/24  1212   SODIUM mEQ/L 142 141   POTASSIUM mEQ/L 4.4 4.2   CHLORIDE mEQ/L 112* 110*   CO2 mEQ/L 23 23   BUN mg/dL 25 26*   CREATININE mg/dL 1.5* 1.7*   CALCIUM mg/dL 7.8* 8.5*   GLUCOSE mg/dL 98 115*     Results from last 7 days   Lab Units 10/16/24  1318 10/16/24  0520 10/15/24  2345   WBC K/uL 4.90 5.26 4.35   HEMOGLOBIN g/dL 8.7* 8.5* 9.1*   HEMATOCRIT % 27.1* 27.6* 28.7*   PLATELETS K/uL 118* 110* 103*           Imaging  I have independently reviewed the pertinent imaging from the last 24 hrs.    ECG/Telemetry  I have independently reviewed the telemetry. No events for the last 24 hours.    ASSESSMENT AND PLAN      * GI bleeding  Assessment & Plan  Presented with 1 day of bleeding from ostomy; does have history of diverticulosis and prior history of bleeding.  Not on A/C but is on Plavix.  Hemodynamically stable on admission.  CTA revealing acute GI bleed within the colon just proximal to the left lower quadrant colostomy.  IR performed successful subselective SMA and MARIE arteriography without evidence of discrete active bleed or pseudoaneurysm. Successful wire dissection at the origin of the sigmoidal artery, accounting for the territory of the bleed.   No further blood in ostomy.    -GI consulted, recs appreciated.  Suspect diverticular source  and now resolved s/p IR intervention.  No further intervention recommended.  -Hgb stable, monitor CBC twice daily for now.  Transfuse for Hgb <8.  -Continue IV PPI BID  -CLD, advance as tolerated  -Having soft-low normal BP, holding methenamine, metoprolol, tamsulosin and torsemide  -Holding clopidogrel  -If acute bleed, STAT CTA for possible further embolization    Compression fracture of lumbar vertebra (CMS/[facility])  Assessment & Plan  CT with incidental finding of age-indeterminate L1 vertebral body compression fracture, new since July 2024.    -Pain control with lidocaine patch and PRN acetaminophen   -Will need PT/OT evaluation once GI bleed is resolved     History of recurrent UTIs  Assessment & Plan  Recently started on course of  bactrim and changed to cefuroxime after home urine dip stick positive.  Denies dysuria.    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  Presented for GIB and after large amount of blood was found in his ostomy bag.    -General surgery evaluated upon admission, recommending GIB work up     Recurrent pleural effusion on left  Assessment & Plan  CT revealing moderate to large left pleural effusion.  Stable on RA.    -Monitor oxygen status closely  -Can consider repeat thoracentesis while admitted once acute issues resolved     CKD (chronic kidney disease) stage 4, GFR 15-29 ml/min (CMS/[facility])  Assessment & Plan  Cr of 1.7 upon admission appears at baseline ~1.6-1.7.  Cr 1.5 today.    -Will monitor Cr closely s/p CTA  -Avoid nephrotoxic agents and renally dose meds    Depression  Assessment & Plan  -Continuing home lexapro 5mg qD     Hypothyroidism  Assessment & Plan  -Continuing home levothyroxine     Coronary artery disease  Assessment & Plan  S/p CABG.    -Holding home Plavix in setting of GI bleed  -Does not appear to be on a statin      Hypertension  Assessment & Plan  BP continue to be low-normal.    -Holding home Metop XL and Torsemide in setting of GI bleed       VTE Assessment: Padua    VTE Prophylaxis:  Current anticoagulants:    None      Code Status: DNR (A.N.D.)      Estimated Discharge Date: 10/17/2024   Disposition Planning: Continuing medical management.     SOPHIA Duvall  10/16/2024

## 2024-10-16 NOTE — OR SURGEON
Pre-Procedure patient identification:  I am the primary operating surgeon/proceduralist and I have reviewed the applicable pathology reports and radiology studies for this procedure. I have identified the patient on 10/15/24 at 9:34 PM Brian Mcgarry MD   Elevated risks of vascular injury and bowel infarct and ischemia due to atherosclerosis and h/o bowel surgery d/w pt's son/POA.

## 2024-10-16 NOTE — PROGRESS NOTES
Hospital to Home Program follow-up call on: [phone]   Pt re admitted to [facility] 10/15/24    ACM spoke with Marko, son.   Pt was admitted for GI bleed. Marko states there is a possibility pt will be discharged tomorrow.   ACM will continue to follow if pt returns home.     ACM to follow up in 1 week-on/around 10/25/24    Pilar Kirkpatrick RN BSN  Ambulatory Care Manager   [phone]

## 2024-10-16 NOTE — ASSESSMENT & PLAN NOTE
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  Presented for GIB and after large amount of blood was found in his ostomy bag.    -General surgery evaluated upon admission, management as per GI bleed problem

## 2024-10-17 ENCOUNTER — APPOINTMENT (INPATIENT)
Dept: RADIOLOGY | Facility: HOSPITAL | Age: 89
DRG: 393 | End: 2024-10-17
Attending: PHYSICIAN ASSISTANT
Payer: MEDICARE

## 2024-10-17 PROBLEM — K92.2 GASTROINTESTINAL HEMORRHAGE, UNSPECIFIED GASTROINTESTINAL HEMORRHAGE TYPE: Status: ACTIVE | Noted: 2024-10-17

## 2024-10-17 LAB
ANION GAP SERPL CALC-SCNC: 6 MEQ/L (ref 3–15)
BUN SERPL-MCNC: 23 MG/DL (ref 7–25)
CALCIUM SERPL-MCNC: 8.2 MG/DL (ref 8.6–10.3)
CHLORIDE SERPL-SCNC: 110 MEQ/L (ref 98–107)
CO2 SERPL-SCNC: 24 MEQ/L (ref 21–31)
CREAT SERPL-MCNC: 1.4 MG/DL (ref 0.7–1.3)
EGFRCR SERPLBLD CKD-EPI 2021: 46.9 ML/MIN/1.73M*2
ERYTHROCYTE [DISTWIDTH] IN BLOOD BY AUTOMATED COUNT: 18.7 % (ref 11.6–14.4)
GLUCOSE SERPL-MCNC: 84 MG/DL (ref 70–99)
HCT VFR BLD AUTO: 22.9 % (ref 40.1–51)
HCT VFR BLD AUTO: 25.5 % (ref 40.1–51)
HCT VFR BLD AUTO: 26.3 % (ref 40.1–51)
HGB BLD-MCNC: 7.4 G/DL (ref 13.7–17.5)
HGB BLD-MCNC: 8.1 G/DL (ref 13.7–17.5)
HGB BLD-MCNC: 8.5 G/DL (ref 13.7–17.5)
MAGNESIUM SERPL-MCNC: 1.9 MG/DL (ref 1.8–2.5)
MCH RBC QN AUTO: 32.3 PG (ref 28–33.2)
MCH RBC QN AUTO: 32.7 PG (ref 28–33.2)
MCH RBC QN AUTO: 32.7 PG (ref 28–33.2)
MCHC RBC AUTO-ENTMCNC: 31.8 G/DL (ref 32.2–36.5)
MCHC RBC AUTO-ENTMCNC: 32.3 G/DL (ref 32.2–36.5)
MCHC RBC AUTO-ENTMCNC: 32.3 G/DL (ref 32.2–36.5)
MCV RBC AUTO: 101.2 FL (ref 83–98)
MCV RBC AUTO: 101.3 FL (ref 83–98)
MCV RBC AUTO: 101.6 FL (ref 83–98)
PLATELET # BLD AUTO: 110 K/UL (ref 150–350)
PLATELET # BLD AUTO: 115 K/UL (ref 150–350)
PLATELET # BLD AUTO: 122 K/UL (ref 150–350)
PMV BLD AUTO: 10.3 FL (ref 9.4–12.4)
PMV BLD AUTO: 9.3 FL (ref 9.4–12.4)
PMV BLD AUTO: 9.9 FL (ref 9.4–12.4)
POTASSIUM SERPL-SCNC: 4.1 MEQ/L (ref 3.5–5.1)
RBC # BLD AUTO: 2.26 M/UL (ref 4.5–5.8)
RBC # BLD AUTO: 2.51 M/UL (ref 4.5–5.8)
RBC # BLD AUTO: 2.6 M/UL (ref 4.5–5.8)
SODIUM SERPL-SCNC: 140 MEQ/L (ref 136–145)
WBC # BLD AUTO: 4.64 K/UL (ref 3.8–10.5)
WBC # BLD AUTO: 4.99 K/UL (ref 3.8–10.5)
WBC # BLD AUTO: 5.23 K/UL (ref 3.8–10.5)

## 2024-10-17 PROCEDURE — 63600000 HC DRUGS/DETAIL CODE: Mod: JZ | Performed by: INTERNAL MEDICINE

## 2024-10-17 PROCEDURE — 63700000 HC SELF-ADMINISTRABLE DRUG: Performed by: INTERNAL MEDICINE

## 2024-10-17 PROCEDURE — 36415 COLL VENOUS BLD VENIPUNCTURE: CPT | Performed by: PHYSICIAN ASSISTANT

## 2024-10-17 PROCEDURE — 21400000 HC ROOM AND CARE CCU/INTERMEDIATE

## 2024-10-17 PROCEDURE — 63600105 HC IODINE BASED CONTRAST: Mod: JZ | Performed by: PHYSICIAN ASSISTANT

## 2024-10-17 PROCEDURE — 74174 CTA ABD&PLVS W/CONTRAST: CPT

## 2024-10-17 PROCEDURE — 63700000 HC SELF-ADMINISTRABLE DRUG: Performed by: PHYSICIAN ASSISTANT

## 2024-10-17 PROCEDURE — 97166 OT EVAL MOD COMPLEX 45 MIN: CPT | Mod: GO

## 2024-10-17 PROCEDURE — 80048 BASIC METABOLIC PNL TOTAL CA: CPT | Performed by: PHYSICIAN ASSISTANT

## 2024-10-17 PROCEDURE — P9016 RBC LEUKOCYTES REDUCED: HCPCS

## 2024-10-17 PROCEDURE — 97162 PT EVAL MOD COMPLEX 30 MIN: CPT | Mod: GP

## 2024-10-17 PROCEDURE — 71045 X-RAY EXAM CHEST 1 VIEW: CPT

## 2024-10-17 PROCEDURE — 85027 COMPLETE CBC AUTOMATED: CPT | Performed by: PHYSICIAN ASSISTANT

## 2024-10-17 PROCEDURE — G0378 HOSPITAL OBSERVATION PER HR: HCPCS

## 2024-10-17 PROCEDURE — 83735 ASSAY OF MAGNESIUM: CPT | Performed by: PHYSICIAN ASSISTANT

## 2024-10-17 PROCEDURE — 99233 SBSQ HOSP IP/OBS HIGH 50: CPT | Performed by: INTERNAL MEDICINE

## 2024-10-17 RX ORDER — POLYETHYLENE GLYCOL 3350 17 G/17G
238 POWDER, FOR SOLUTION ORAL ONCE
Status: COMPLETED | OUTPATIENT
Start: 2024-10-17 | End: 2024-10-17

## 2024-10-17 RX ORDER — BISACODYL 5 MG
10 TABLET, DELAYED RELEASE (ENTERIC COATED) ORAL ONCE
Status: COMPLETED | OUTPATIENT
Start: 2024-10-17 | End: 2024-10-17

## 2024-10-17 RX ORDER — SODIUM CHLORIDE, SODIUM LACTATE, POTASSIUM CHLORIDE, CALCIUM CHLORIDE 600; 310; 30; 20 MG/100ML; MG/100ML; MG/100ML; MG/100ML
INJECTION, SOLUTION INTRAVENOUS CONTINUOUS
Status: ACTIVE | OUTPATIENT
Start: 2024-10-17 | End: 2024-10-18

## 2024-10-17 RX ORDER — GUAIFENESIN 600 MG/1
600 TABLET, EXTENDED RELEASE ORAL 2 TIMES DAILY
Status: DISCONTINUED | OUTPATIENT
Start: 2024-10-17 | End: 2024-10-24 | Stop reason: HOSPADM

## 2024-10-17 RX ORDER — POLYETHYLENE GLYCOL 3350 17 G/17G
238 POWDER, FOR SOLUTION ORAL ONCE
Status: DISCONTINUED | OUTPATIENT
Start: 2024-10-17 | End: 2024-10-17 | Stop reason: ENTERED-IN-ERROR

## 2024-10-17 RX ORDER — POLYETHYLENE GLYCOL 3350 17 G/17G
238 POWDER, FOR SOLUTION ORAL ONCE
Status: DISCONTINUED | OUTPATIENT
Start: 2024-10-17 | End: 2024-10-17 | Stop reason: CLARIF

## 2024-10-17 RX ORDER — SODIUM CHLORIDE 9 MG/ML
5 INJECTION, SOLUTION INTRAVENOUS AS NEEDED
Status: ACTIVE | OUTPATIENT
Start: 2024-10-17 | End: 2024-10-18

## 2024-10-17 RX ORDER — IOPAMIDOL 755 MG/ML
100 INJECTION, SOLUTION INTRAVASCULAR
Status: COMPLETED | OUTPATIENT
Start: 2024-10-17 | End: 2024-10-17

## 2024-10-17 RX ADMIN — BISACODYL 10 MG: 5 TABLET, COATED ORAL at 15:45

## 2024-10-17 RX ADMIN — LATANOPROST 1 DROP: 50 SOLUTION/ DROPS OPHTHALMIC at 21:19

## 2024-10-17 RX ADMIN — LIDOCAINE 4% 1 PATCH: 40 PATCH TOPICAL at 12:24

## 2024-10-17 RX ADMIN — POLYETHYLENE GLYCOL 3350 238 G: 17 POWDER, FOR SOLUTION ORAL at 17:50

## 2024-10-17 RX ADMIN — SODIUM CHLORIDE, POTASSIUM CHLORIDE, SODIUM LACTATE AND CALCIUM CHLORIDE: 600; 310; 30; 20 INJECTION, SOLUTION INTRAVENOUS at 16:39

## 2024-10-17 RX ADMIN — PANTOPRAZOLE SODIUM 40 MG: 40 INJECTION, POWDER, LYOPHILIZED, FOR SOLUTION INTRAVENOUS at 21:19

## 2024-10-17 RX ADMIN — PANTOPRAZOLE SODIUM 40 MG: 40 INJECTION, POWDER, LYOPHILIZED, FOR SOLUTION INTRAVENOUS at 12:24

## 2024-10-17 RX ADMIN — IOPAMIDOL 100 ML: 755 INJECTION, SOLUTION INTRAVENOUS at 11:00

## 2024-10-17 ASSESSMENT — COGNITIVE AND FUNCTIONAL STATUS - GENERAL
EATING MEALS: 3 - A LITTLE
CLIMB 3 TO 5 STEPS WITH RAILING: 1 - TOTAL
TOILETING: 1 - TOTAL
STANDING UP FROM CHAIR USING ARMS: 3 - A LITTLE
HELP NEEDED FOR PERSONAL GROOMING: 3 - A LITTLE
CLIMB 3 TO 5 STEPS WITH RAILING: 2 - A LOT
WALKING IN HOSPITAL ROOM: 2 - A LOT
MOVING TO AND FROM BED TO CHAIR: 3 - A LITTLE
WALKING IN HOSPITAL ROOM: 3 - A LITTLE
AFFECT: WFL
DRESSING REGULAR UPPER BODY CLOTHING: 3 - A LITTLE
AFFECT: CONFUSED;WFL
HELP NEEDED FOR BATHING: 2 - A LOT
DRESSING REGULAR LOWER BODY CLOTHING: 2 - A LOT
STANDING UP FROM CHAIR USING ARMS: 2 - A LOT
MOVING TO AND FROM BED TO CHAIR: 2 - A LOT

## 2024-10-17 NOTE — PLAN OF CARE
Problem: Adult Inpatient Plan of Care  Goal: Plan of Care Review  Outcome: Progressing  Flowsheets (Taken 10/17/2024 8583)  Progress: improving  Outcome Evaluation: OT eval completed, rec. home with 24/7 HHA assist and HHOT  Plan of Care Reviewed With: patient     Problem: Self-Care Deficit  Goal: Improved Ability to Complete Activities of Daily Living  Outcome: Progressing

## 2024-10-17 NOTE — PROGRESS NOTES
Hospital Medicine     Daily Progress Note       SUBJECTIVE   Interval History: Patient began using dark red blood and clots from his ostomy bag overnight.  Hemoglobin remained stable at 8-9.  CTA showing small active bleed at similar site of prior bleeding, less pronounced than prior.  Discussed with both GI and IR, plan for bowel prep tonight with colonoscopy through ostomy bag tomorrow.    Patient seen examined.  He was awake and alert, oriented, comfortably sitting up in bed and well saturated on room air.  At the time of our encounter he was feeling well and had no complaints.  Denied lightheadedness/dizziness, chest pain, shortness breath, abdominal pain, nausea.  His ostomy was pink and patent, edematous and protruding with slow trickle of dark red blood and small clots.    Some hours after our encounter he began feeling short of breath, denied chest pain.  BP stable, HR 79, 98% on room air.  He feels anxious about the bleeding, may be contributing.  Checking a chest x-ray as he has a known moderate-large pleural effusion.    Updated his son Marko via phone multiple times, including after final discussion with GI and IR.  All questions and concerns were addressed.     OBJECTIVE      Vital signs in last 24 hours:  Temp:  [36.3 °C (97.3 °F)-36.8 °C (98.3 °F)] 36.8 °C (98.3 °F)  Heart Rate:  [67-88] 79  Resp:  [16-18] 16  BP: ()/(26-68) 109/55    Intake/Output Summary (Last 24 hours) at 10/17/2024 1414  Last data filed at 10/17/2024 1230  Gross per 24 hour   Intake --   Output 935 ml   Net -935 ml       PHYSICAL EXAMINATION      Physical Exam  Vitals and nursing note reviewed.   Constitutional:       General: He is not in acute distress.     Comments: Awake and alert, oriented.  Thin, frail and chronically ill-appearing.  Pleasant demeanor.   HENT:      Head: Normocephalic and atraumatic.      Nose: Nose normal.      Mouth/Throat:      Mouth: Mucous membranes are moist.   Eyes:      Pupils: Pupils are  equal, round, and reactive to light.   Cardiovascular:      Rate and Rhythm: Normal rate. Rhythm irregular.      Heart sounds: Murmur heard.      Comments: Grade III/VI murmur  Pulmonary:      Effort: Pulmonary effort is normal. No respiratory distress.      Breath sounds: No wheezing.      Comments: Diminished air entry in the bilateral bases, left greater than right.  Well saturated on room air.  Abdominal:      General: Bowel sounds are normal. There is no distension.      Palpations: Abdomen is soft.      Tenderness: There is no abdominal tenderness.      Comments: Ostomy pink and patent, edematous and protruding today.  Slow trickle of dark red blood with small blood clots.   Musculoskeletal:      Right lower leg: No edema.      Left lower leg: No edema.   Skin:     General: Skin is warm and dry.   Neurological:      Mental Status: He is alert and oriented to person, place, and time.   Psychiatric:         Mood and Affect: Mood normal.         Behavior: Behavior normal.         Thought Content: Thought content normal.        LINES, CATHETERS, DRAINS, AIRWAYS, AND WOUNDS   Lines, Drains, and Airways:  Wounds (agree with documentation and present on admission):  Peripheral IV (Adult) 10/15/24 Left Antecubital (Active)   Number of days: 2       Peripheral IV (Adult) 10/15/24 Right Antecubital (Active)   Number of days: 2         Comments:    LABS / IMAGING / TELE      Labs  Results from last 7 days   Lab Units 10/17/24  0350 10/16/24  0520 10/15/24  1212   SODIUM mEQ/L 140 142 141   POTASSIUM mEQ/L 4.1 4.4 4.2   CHLORIDE mEQ/L 110* 112* 110*   CO2 mEQ/L 24 23 23   BUN mg/dL 23 25 26*   CREATININE mg/dL 1.4* 1.5* 1.7*   CALCIUM mg/dL 8.2* 7.8* 8.5*   GLUCOSE mg/dL 84 98 115*     Results from last 7 days   Lab Units 10/17/24  1027 10/17/24  0350 10/16/24  1818   WBC K/uL 4.99 4.64 5.20   HEMOGLOBIN g/dL 8.1* 8.5* 8.8*   HEMATOCRIT % 25.5* 26.3* 27.9*   PLATELETS K/uL 110* 122* 106*           Imaging  I have  "independently reviewed the pertinent imaging from the last 24 hrs.    ECG/Telemetry  I have independently reviewed the telemetry. No events for the last 24 hours.    ASSESSMENT AND PLAN      * GI bleeding  Assessment & Plan  Presented with 1 day of bleeding from ostomy; does have history of diverticulosis and prior history of bleeding.  Not on A/C but is on Plavix.  Hemodynamically stable on admission.  CTA revealing acute GI bleed within the colon just proximal to the left lower quadrant colostomy.  IR performed successful subselective SMA and MARIE arteriography without evidence of discrete active bleed or pseudoaneurysm. Successful wire dissection at the origin of the sigmoidal artery, accounting for the territory of the bleed.   Suspect diverticular bleed.    Now having bleeding into the ostomy, darker red slow ooze/trickle with clots. Hemodynamically stable and Hgb stable at 8.1  CTA with \"Active GI bleed is noted involving the colon just proximal to the colostomy. This is similar location to the prior study however is not as pronounced.\"    -Discussed with both GI and IR. No IR intervention recommended as the site is close to the ostomy and does not appear to be from an arterial source. GI in agreement and will do scope through ostomy tomorrow.  -Prep ordered  -Will transfuse 1U pRBCs tonight in preparation for procedure  -If bleeding increases significantly, becomes overtly arterial or patient decompensates, contact IR for possible procedure  -Hgb stable, monitor CBC q6hr for now.  Transfuse for Hgb <8.  -Continue IV PPI BID  -NPO  -Having soft-low normal BP, holding methenamine, metoprolol, tamsulosin and torsemide  -Holding clopidogrel    Shortness of breath  Assessment & Plan  Started having shortness of breath today. Denies chest pain.  Well saturated on room air. Hemodynamically stable.  Currently with a GIB, blood oozing from his ostomy with stable anemia for now.  Also has a known moderate-large left " pleural effusion and small right pleural effusion.    -Will obtain a CXR  -Continue to monitor  -Receiving 1U pRBCs tonight in preparation for colonoscopy tomorrow      History of recurrent UTIs  Assessment & Plan  Recently started on course of  bactrim and changed to cefuroxime after home urine dip stick positive.  Denies dysuria.  UA abnormal.    -Follow urine cx assess resolution  -Hold on further antibx for now pending cx results    Recurrent pleural effusion on left  Assessment & Plan  Last thoracentesis in 09/2024.  CT revealing moderate to large left pleural effusion.  Stable on RA.    -Monitor oxygen status closely  -Can consider repeat thoracentesis while admitted once acute issues resolved     Compression fracture of lumbar vertebra (CMS/[facility])  Assessment & Plan  CT with incidental finding of age-indeterminate L1 vertebral body compression fracture, new since July 2024.  Pain is minimal.    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  Presented for GIB and after large amount of blood was found in his ostomy bag.    -General surgery evaluated upon admission, recommending GIB work up     CKD (chronic kidney disease) stage 4, GFR 15-29 ml/min (CMS/[facility])  Assessment & Plan  Cr of 1.7 upon admission appears at baseline ~1.6-1.7.  Cr below baseline.    -Will monitor Cr closely s/p CTA  -Avoid nephrotoxic agents and renally dose meds    Depression  Assessment & Plan  -Continuing home lexapro 5mg qD     Hypothyroidism  Assessment & Plan  -Continuing home levothyroxine     Coronary artery disease  Assessment & Plan  S/p CABG.    -Holding home Plavix in setting of GI bleed  -Does not appear to be on a statin     Hypertension  Assessment & Plan  BP continue to be low-normal.    -Holding home Metop XL and Torsemide in setting of GI bleed         VTE Assessment: Padua    VTE  Prophylaxis:  Current anticoagulants:    None      Code Status: DNR (A.N.D.)      Estimated Discharge Date: 10/19/2024   Disposition Planning: Continuing medical management.     SOPHIA Duvall  10/17/2024

## 2024-10-17 NOTE — PROGRESS NOTES
Gastroenterology  Daily Progress Note       SUBJECTIVE   This is a 89 y.o. year-old male admitted on 10/15/2024 with GI bleeding [K92.2]  Thrombocytopenia (CMS/HCC) [D69.6]  Hemoglobin drop [R71.0]  Hemorrhage from enterostomy stoma (CMS/HCC) [K94.11]  Anemia, unspecified type [D64.9]  Gastrointestinal hemorrhage, unspecified gastrointestinal hemorrhage type [K92.2].    This am notified pt began to pass large clot followed by 50-75cc of dark blood from ostomy. This am pt feels fine but concerned about further bleeding.      OBJECTIVE      Vital signs in last 24 hours:  Temp:  [36.3 °C (97.3 °F)-36.8 °C (98.2 °F)] 36.3 °C (97.3 °F)  Heart Rate:  [67-86] 79  Resp:  [16-18] 16  BP: ()/(51-71) 121/68    Intake/Output Summary (Last 24 hours) at 10/17/2024 0709  Last data filed at 10/16/2024 1649  Gross per 24 hour   Intake --   Output 300 ml   Net -300 ml       PHYSICAL EXAMINATION        General appearance - alert, no apparent distress  Eyes - pupils equal and reactive, extraocular eye movements intact, sclera anicteric  Mouth - mucous membranes moist, pharynx normal without lesions  Chest - no evidence of respiratory distress  Heart -tachycardic  Abdomen - soft, nontender, nondistended.  Ostomy in place without signs of ischemia, dark red liquid blood in ostomy  Skin - normal coloration and turgor, no rashes, no suspicious skin lesions noted   LABS / IMAGING / TELE      Labs  Reviewed  Results from last 7 days   Lab Units 10/17/24  0350 10/16/24  0520 10/15/24  1212   SODIUM mEQ/L 140 142 141   POTASSIUM mEQ/L 4.1 4.4 4.2   CHLORIDE mEQ/L 110* 112* 110*   CO2 mEQ/L 24 23 23   BUN mg/dL 23 25 26*   CREATININE mg/dL 1.4* 1.5* 1.7*   CALCIUM mg/dL 8.2* 7.8* 8.5*   GLUCOSE mg/dL 84 98 115*     Results from last 7 days   Lab Units 10/17/24  0350   MAGNESIUM mg/dL 1.9     Results from last 7 days   Lab Units 10/17/24  0350 10/16/24  1818 10/16/24  1318 10/15/24  1528 10/15/24  1212   WBC K/uL 4.64 5.20 4.90   < >  5.47   HEMOGLOBIN g/dL 8.5* 8.8* 8.7*   < > 10.2*   HEMATOCRIT % 26.3* 27.9* 27.1*   < > 31.8*   PLATELETS K/uL 122* 106* 118*   < > 123*   DIFF TYPE   --   --   --   --  Auto   NRBC %  --   --   --   --  0.0   IMM GRANULOCYTES %  --   --   --   --  0.4   NEUTROPHILS %  --   --   --   --  75.1   LYMPHOCYTES %  --   --   --   --  11.7   MONOCYTES %  --   --   --   --  7.1   EOSINOPHILS %  --   --   --   --  4.6   BASOPHILS %  --   --   --   --  1.1   IMM GRANUCOCYTES ABS K/uL  --   --   --   --  0.02   MONO ABS AUTO K/uL  --   --   --   --  0.39   EOS ABS AUTO K/uL  --   --   --   --  0.25   BASO ABS AUTO K/uL  --   --   --   --  0.06    < > = values in this interval not displayed.         SARS-CoV-2 (COVID-19) (no units)   Date/Time Value   09/23/2024 2212 Negative          Imaging  Reviewed    ASSESSMENT AND PLAN        #Bright red blood per stoma  Patient presents with 1 day history of blood from stoma, now status post IR embolization of sigmoidal artery.  Yesterday pt did not have blood from ostomy, but overnight is now having bleeding. Repeat Hgb pending.   -NPO  - Would rec obtaining rpt CTA given worsening bleeding as of 930am. Depending on CTA please reach out to IR   -Will consider diagnostic flex sig tomorrow, pt would require prep  - GI to order prep pending CTA results  - Please obtain cardiac risk stratification prior to potential procedure  -Monitor hemoglobin, transfuse < 7  -If recurrent large-volume bleeding, consider repeat CTA for further embolization.      Please see attending attestation for final recommendations    Vern Campbell MD  Gastroenterology Fellow PGY-4  Pager #[phone]     VTE Assessment: Padua    Code Status: DNR (A.N.D.)  Estimated discharge date: 10/17/2024

## 2024-10-17 NOTE — PROGRESS NOTES
Physical Therapy -  Initial Evaluation     Patient: Samy Elena  Location: [place] [address]  MRN: [id number]  Today's date: 10/17/2024    HISTORY OF PRESENT ILLNESS     Samy is a 89 y.o. male admitted on 10/15/2024 with GI bleeding [K92.2]  Thrombocytopenia (CMS/[facility]) [D69.6]  Hemoglobin drop [R71.0]  Hemorrhage from enterostomy stoma (CMS/[facility]) [K94.11]  Anemia, unspecified type [D64.9]  Gastrointestinal hemorrhage, unspecified gastrointestinal hemorrhage type [K92.2]. Principal problem is GI bleeding.    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    History of Present Illness  Presents with BRB from ostomy    s/p IR intervention on 10/10          PRIOR LEVEL OF FUNCTION AND LIVING ENVIRONMENT     Prior Level of Function      Flowsheet Row Most Recent Value   Prior Level of Function Comment pt has 24/7 HHA assists c ADLs, IALs, transfers, mostly uses w/c for mobility, occasional short distance amb with RW   Assistive Device Currently Used at Home wheelchair, ramps, walker, front-wheeled, walker, 4-wheeled, hospital bed             Prior Living Environment      Flowsheet Row Most Recent Value   People in Home other (see comments)   Current Living Arrangements home   Home Accessibility ramp to enter home   Living Environment Comment pt has 24/7 HHA assists c ADLs, IALs, transfers, and amb c RW          VITALS AND PAIN     PT Vitals      Date/Time Pulse HR Source SpO2 Pt Activity O2 Therapy BP Pt Position Who   10/17/24 1026 85 Monitor 97 % At rest None (Room air) 105/60 Lying NB   10/17/24 1030 -- -- -- -- --  93/54 Sitting NB   10/17/24 1033 -- -- -- -- -- 74/26 Standing NB   10/17/24 1035 -- -- -- -- -- 74/44 Standing NB   10/17/24 1040 88 Monitor -- -- -- 85/52 Lying NB             Objective   OBJECTIVE     Start time:  1023  End time:  1040  Session Length: 17 min  Mode of Treatment: co-treatment  Reason for co-treatment: expedite d/c planning    General Observations  Patient received upright, in bed. He was agreeable to therapy, no issues or concerns identified by nurse prior to session.      Precautions: fall (+ostomy)       Limitations/Impairments: hearing      PT Eval and Treat - 10/17/24 1023          Cognition    Orientation Status oriented x 3     Affect/Mental Status WFL     Follows Commands WFL     Cognitive Function WFL     Comment, Cognition Pt is pleasant and cooperative, able to make needs known and follows commands appropriately, mild [place] requiring repetition of cues        Vision Assessment/Intervention    Vision Assessment corrective lenses full-time        Hearing Assessment    Hearing Status hearing aid, bilateral        Sensory Assessment    Sensory Assessment sensation intact, lower extremities        Lower Extremity Assessment    LE Assessment ROM and Strength WFL        Bed Mobility    Bed Mobility Activities supine to sit;sit to supine     [place] supervision;minimum assist (75% or more patient effort)     Safety/Cues minimal;verbal cues;sequencing;technique     Assistive Device head of bed elevated     Comment OOB to R with Cyn + increased time. Requiring Robbi to achieve supine 2/2 orthostatics        Sit/Stand Transfer    Surface edge of bed     [place] minimum assist (75% or more patient effort);2 person assist     Safety/Cues minimal;verbal cues;hand placement     Assistive Device walker, front-wheeled     Transfer Comments minAx2 to/from EOB rising to RW in standing. (+) orthostatic BP drop        Gait Training    [place], Gait minimum assist (75% or more patient effort);1  person assist     Safety/Cues minimal;verbal cues;hand placement;proper use of assistive device;technique     Assistive Device walker, front-wheeled     Distance in Feet 2 feet     Pattern step-to     Comment side steps toward HOB only due to orthostatics        Balance    Static Sitting Balance WFL     Dynamic Sitting Balance WFL     Sit to Stand Dynamic Balance mild impairment     Static Standing Balance mild impairment     Dynamic Standing Balance mild impairment        Impairments/Safety Issues    Impairments Affecting Function balance;endurance/activity tolerance;strength     Functional Endurance limited by (+) orthostatics                                    Education Documentation  Treatment Plan, taught by Andra Headley PT at 10/17/2024  1:52 PM.  Learner: Patient  Readiness: Acceptance  Method: Explanation  Response: Verbalizes Understanding  Comment: Role of PT, POC, safe mob, RW support, LE ther ex        Session Outcome  Patient reclined, in bed (w/ transport team) at end of session,  (on stretcher with transport team). Nursing notified about patient's performance, change in vital signs, patient's response to therapy/activity, and patient's position.    AM-PAC™ - Mobility (Current Function)     Turning form your back to your side while in flat bed without using bedrails 3 - A Little   Moving from lying on your back to sitting on the side of a flat bed without using bedrails 3 - A Little   Moving to and from a bed to a chair 3 - A Little   Standing up from a chair using your arms 3 - A Little   To walk in a hospital room 3 - A Little   Climbing 3-5 steps with a railing 2 - A Lot   AM-PAC™ Mobility Score 17      ASSESSMENT AND PLAN     Progress Summary  PT eval completed. Pt currently performs bed with Cyn, requires minAx2 for STS txfs and minAx1 for steps. pt limited by symptomatic orthostatic hypotension during session. PT will continue to follow. Rec d/c home with 24/7 HHA for mobility and ADLs +  HHPT    Patient/Family Therapy Goals Statement: [employer]    PT Plan      Flowsheet Row Most Recent Value   Rehab Potential good, to achieve stated therapy goals at 10/17/2024 1023   Therapy Frequency 4 times/wk at 10/17/2024 1023   Planned Therapy Interventions balance training, bed mobility training, gait training, home exercise program, strengthening, transfer training, ROM (range of motion), postural re-education at 10/17/2024 1023            PT Discharge Recommendations      Flowsheet Row Most Recent Value   PT Recommended Discharge Disposition home with home health, home with assistance at 10/17/2024 1023   Anticipated Equipment Needs if Discharged Home (PT) none  [owns appropriate DME] at 10/17/2024 1023

## 2024-10-17 NOTE — HOSPITAL COURSE
Samy is a 89 y.o. male admitted on 10/15/2024 with GI bleeding [K92.2]  Thrombocytopenia (CMS/[facility]) [D69.6]  Hemoglobin drop [R71.0]  Hemorrhage from enterostomy stoma (CMS/[facility]) [K94.11]  Anemia, unspecified type [D64.9]  Gastrointestinal hemorrhage, unspecified gastrointestinal hemorrhage type [K92.2]. Principal problem is GI bleeding.    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    History of Present Illness  Presents with BRB from ostomy    s/p IR intervention on 10/10

## 2024-10-17 NOTE — PLAN OF CARE
Problem: Adult Inpatient Plan of Care  Goal: Plan of Care Review  Outcome: Progressing  Flowsheets (Taken 10/17/2024 1134)  Progress: improving  Outcome Evaluation: PT eval completed. Rec d/c home with 24/7 HHA assist + HHPT  Plan of Care Reviewed With: patient     Problem: Mobility Impairment  Goal: Optimal Mobility  Outcome: Progressing      "    SUBJECTIVE:   Evans Kaiser is a 5 year old female, here for a routine health maintenance visit,   accompanied by her { FAMILY MEMBERS:005183}.    Patient was roomed by: ***  Do you have any forms to be completed?  {YES CAPS/NO SMALL:575548::\"no\"}    SOCIAL HISTORY  Child lives with: { FAMILY MEMBERS:665408}  Who takes care of your child: {Child caretakers:616373}  Language(s) spoken at home: {LANGUAGES SPOKEN:914837::\"English\"}  Recent family changes/social stressors: {FAMILY STRESS CHILD2:184536::\"none noted\"}    SAFETY/HEALTH RISK  {Does anyone who takes care of your child smoke?  :227285::\"Is your child around anyone who smokes:  No\"}  {TB exposure? ASK FIRST 4 QUESTIONS; CHECK NEXT 2 CONDITIONS :907677::\"TB exposure:  No\"}  {Car seat 4-8y:287679::\"Child in car seat or booster in the back seat:  Yes\"}  {Bike/ sport helmet for bike trailer or trike?:211805::\"Bike/ sport helmet for bike trailer or trike?  Yes\"}  Home Safety Survey:  {Wood stove/Fireplace screened?  :684658::\"Wood stove/Fireplace screened:  Yes\"}  {Poisons/cleaning supplies out of reach?  :773812::\"Poisons/cleaning supplies out of reach:  Yes\"}  {Swimming pool?  :612665::\"Swimming pool:  No\"}    Guns/firearms in the home: {ENVIR/GUNS:384620::\"No\"}  {Is your child ever at home alone?:604850::\"Is your child ever at home alone:  No\"}  Cardiac risk assessment:     Family history (males <55, females <65) of angina (chest pain), heart attack, heart surgery for clogged arteries, or stroke: { :375069::\"no\"}    Biological parent(s) with a total cholesterol over 240:  { :674393::\"no\"}    DENTAL  Dental health HIGH risk factors: {Dental Risk Factors 4+:112203::\"none\"}  Water source:  {Water source:972735::\"city water\"}    DAILY ACTIVITIES  DIET AND EXERCISE  Does your child get at least 4 helpings of a fruit or vegetable every day: {Yes default/NO BOLD:574732::\"Yes\"}  What does your child drink besides milk and water (and how much?): ***  Does your " "child get at least 60 minutes per day of active play, including time in and out of school: {Yes default/NO BOLD:958113::\"Yes\"}  TV in child's bedroom: {YES BOLD/NO:914548::\"No\"}    {Daily activities 4y:715849}    PROBLEM LIST  Patient Active Problem List   Diagnosis     NO ACTIVE PROBLEMS     MEDICATIONS  Current Outpatient Prescriptions   Medication Sig Dispense Refill     acetaminophen (ACETAMINOPHEN CHILDRENS) 160 MG/5ML suspension Take 7.5 mLs (240 mg) by mouth every 6 hours as needed for fever or mild pain (Patient not taking: Reported on 4/2/2018) 148 mL 0     azithromycin (ZITHROMAX) 200 MG/5ML suspension Give 4.9 mL (194 mg) on day 1 then 2.4 mL (97 mg) days 2 - 5 1 Bottle 0     IBUPROFEN PO Reported on 3/17/2017        ALLERGY  Allergies   Allergen Reactions     No Known Allergies        IMMUNIZATIONS  Immunization History   Administered Date(s) Administered     DTAP (<7y) 10/01/2015     DTAP-IPV/HIB (PENTACEL) 2014, 2014, 2014     HEPA 06/22/2015, 05/18/2017     HepB 2014, 2014, 2014     Hib (PRP-T) 2014, 2014, 2014, 10/01/2015     MMR 10/01/2015, 05/18/2017     Meningococcal (Menactra ) 05/18/2017     Pneumo Conj 13-V (2010&after) 2014, 2014, 2014, 10/01/2015     Poliovirus, inactivated (IPV) 2014, 2014, 2014     Rotavirus, pentavalent 2014, 2014, 2014     Typhoid IM 05/18/2017     Varicella 06/22/2015     Yellow Fever 05/18/2017       HEALTH HISTORY SINCE LAST VISIT  {HEALTH HX 1:700718::\"No surgery, major illness or injury since last physical exam\"}    ROS  {ROS Choices:496983}    OBJECTIVE:   EXAM  There were no vitals taken for this visit.  No height on file for this encounter.  No weight on file for this encounter.  No height and weight on file for this encounter.  No blood pressure reading on file for this encounter.  {Ped exam 15m - 8y:868986}    ASSESSMENT/PLAN:   {Diagnosis " "Picklist:337989}    Anticipatory Guidance  {Anticipatory guidance 4-5y:406456::\"The following topics were discussed:\",\"SOCIAL/ FAMILY:\",\"NUTRITION:\",\"HEALTH/ SAFETY:\"}    Preventive Care Plan  Immunizations    {Vaccine counseling is expected when vaccines are given for the first time.   Vaccine counseling would not be expected for subsequent vaccines (after the first of the series) unless there is significant additional documentation:648289}  Referrals/Ongoing Specialty care: {C&TC :824444::\"No \"}  See other orders in [facility].  BMI at No height and weight on file for this encounter.  {BMI Evaluation - If BMI >/= 85th percentile for age, complete Obesity Action Plan:283656::\"No weight concerns.\"}  Dyslipidemia risk:    {Obtain 2 fasting lipid panels at least 2 weeks apart if any of the following apply :839055::\"None\"}  Dental visit recommended: {C&TC required- NOT an exclusion reason for dental varnish:953487::\"Yes\"}  {DENTAL VARNISH- C&TC REQUIRED (AAP recommended) thru 5 yr:656506}    FOLLOW-UP:    { :084542::\"in 1 year for a Preventive Care visit\"}    Resources  Goal Tracker: Be More Active  Goal Tracker: Less Screen Time  Goal Tracker: Drink More Water  Goal Tracker: Eat More Fruits and Veggies  [place] Child and Teen Checkups (C&TC) Schedule of Age-Related Screening Standards    Ana Joseph, PNP, APRN [facility]  "

## 2024-10-17 NOTE — PROGRESS NOTES
IR Note    Patient has low flow bleeding from colostomy with a faintly positive CTA today at a distance of 4 cm from the colostomy opening from a diverticulum. Recent angiogram was complicated by either dissection or stasis from poor vasculature. In light of the proximity to the colostomy and technical difficulties regarding patient's vasculature, bowel prep and colonoscopy may be a preferred means of intervention. If the patient decompensates with arterialized bleeding from the colostomy, please recontact IR for more emergent angiography. This was discussed with the medical service and Dr. Campbell from GI.

## 2024-10-17 NOTE — PROGRESS NOTES
"Smay Jackson Kassy   [id number]    Your doctor has referred you for a CT examination that requires the injection of an iodinated contrast material into your bloodstream. This iodinated contrast material, sometimes referred to as \"x-ray dye\" allows for better interpretation of the x-ray films or CT images and results in a more accurate interpretation of the examination.     Without the use of iodinated contrast (x-ray dye), the examination may be less informative and result in a suboptimal examination, and possibly a delay in diagnosis and, if needed, treatment.     The iodinated contrast material is given through a small needle or catheter placed into a vein, usually on the inside of the elbow, on the back of hand, or in a vein in the foot or lower leg.    The most common, though still rare, potential reaction to an intravenous contrast injection is an allergic-like reaction. Most allergic-like reactions are mild, though a small subset of people can have more severe reactions. Mild reactions include mild / scattered hives, itching, scratchy throat, sneezing and nasal congestion. More severe reactions include facial swelling, severe difficulty breathing, wheezing and anaphylactic shock. Those with a prior history allergic-like reaction to the same class of contrast media (such as CT contrast or MRI contrast) are at the highest risk for an allergic reaction.     If you believe you had an allergic reaction to contrast in the past, please let our staff know. We can determine if this increases your risk for a future reaction and provide steps to decrease the risk. This may delay your examination, but it decreases the risk of having a new and possibly more severe reaction to the contrast injection.    People with a history of prior allergic reactions to other substances (such as unrelated medications and food) and patients with a history of asthma have slightly increased risk for an allergic reaction from contrast " material when compared with the general population, but do not require to be pretreated prior to a contrast injection.    You should notify the physician, nurse or technologist if you have ever had any of these conditions or if you have any questions.

## 2024-10-17 NOTE — PROGRESS NOTES
Occupational Therapy -  Initial Evaluation     Patient: Samy Elena  Location: [place] [address]  MRN: [id number]  Today's date: 10/17/2024    HISTORY OF PRESENT ILLNESS     Samy is a 89 y.o. 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 Principal problem is GI bleeding.    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    History of Present Illness  Presents with BRB from ostomy    s/p IR intervention on 10/10          PRIOR LEVEL OF FUNCTION AND LIVING ENVIRONMENT     Prior Level of Function      Flowsheet Row Most Recent Value   Dominant Hand right   Ambulation assistive equipment and person   Transferring assistive equipment and person   Toileting assistive person   Bathing assistive person   Dressing assistive person   Eating independent   IADLs assistive person   Driving/Transportation friends/family   Communication understands/communicates without difficulty   Prior Level of Function Comment pt has 24/7 HHA assists c ADLs, IALs, transfers, mostly uses w/c for mobility, occasional short distance amb with RW   Assistive Device Currently Used at Home wheelchair, ramps, walker, front-wheeled, walker, 4-wheeled, hospital bed, commode, 3-in-1             Prior Living Environment      Flowsheet Row Most Recent Value   People in Home other (see comments)   Current Living  Arrangements home   Home Accessibility ramp to enter home   Living Environment Comment pt has 24/7 HHA assists c ADLs, IALs, transfers, and amb c RW          Occupational Profile      Flowsheet Row Most Recent Value   Occupational History/Life Experiences retired professor teaching ethics and mgmt, retired president of [employer]   Performance Patterns enjoys watching [place] sports   Environmental Supports and Barriers 24/7 HHA          VITALS AND PAIN     OT Vitals      Date/Time Pulse HR Source SpO2 Pt Activity O2 Therapy BP Pt Position [facility]   10/17/24 1026 85 Monitor 97 % At rest None (Room air) 105/60 Lying NB   10/17/24 1030 -- -- -- -- -- 93/54 Sitting NB   10/17/24 1033 -- -- -- -- -- 74/26 Standing NB   10/17/24 1035 -- -- -- -- -- 74/44 Standing NB   10/17/24 1040 88 Monitor -- -- -- 85/52 Lying NB             Objective   OBJECTIVE     Start time:  1024  End time:  1040  Session Length: 16 min  Mode of Treatment: co-treatment  Reason for co-treatment: expedite d/c planning, decr. activity tolerance    General Observations  Patient received upright, in bed. He was agreeable to therapy, no issues or concerns identified by nurse prior to session. awake and alert in bed    Precautions: fall (+ostomy)       Limitations/Impairments: hearing, safety/cognitive   Services  Do You Speak a Language Other Than English at Home?: no  Is an  Needed/Used?: No      OT Eval and Treat - 10/17/24 1024          Cognition    Orientation Status oriented x 3;verbal cues/prompts needed for orientation     Affect/Mental Status confused;WFL     Follows Commands WFL;increased processing time needed;verbal cues/prompting required;repetition of directions required     Cognitive Function safety deficit     Safety Deficit minimal deficit;insight into deficits/self-awareness;judgment     Comment, Cognition Pt is pleasant and cooperative, able to make needs known and follows commands appropriately, mild [place]  requiring repetition of cues. Pt appreciative, receptive to all edu. and cueing throughout     Cognitive Interventions/Strategies occupation/activity based interventions        Vision Assessment/Intervention    Vision Assessment corrective lenses full-time        Hearing Assessment    Hearing Status hearing aid, bilateral;hearing impairment, bilaterally     Impact of Hearing Impairment on Functional Status benefits from incr. speaking volume and repetition        Sensory Assessment    Sensory Assessment sensation intact, upper extremities        Upper Extremity Assessment    UE Assessment ROM and Strength WFL        Bed Mobility    Bed Mobility Activities supine to sit;sit to supine     [place] minimum assist (75% or more patient effort);1 person assist;close supervision     Safety/Cues increased time to complete;minimal;verbal cues;technique     Assistive Device head of bed elevated;bed rails     Comment OOB toward Rt side with CS using bed rails for assist, incr. time and effort to complete. Pt denies dizziness seated EOB, notable drop in bp at EOB and again in standing, pt assisted back to supine with Robbi        Sit/Stand Transfer    Surface edge of bed     [place] minimum assist (75% or more patient effort);2 person assist     Safety/Cues increased time to complete;minimal;verbal cues;technique;sequencing     Assistive Device walker, front-wheeled     Transfer Comments minAx2 from EOB, incr. time with min cueing to complete, pt using RW for support once in standing. +OH with mild symptoms. pt took one step toward HOB with Robbi and RW before sitting        Functional Mobility    Functional Mobility Comments deferred 2/2 +OH        Upper Body Dressing    Tasks don;hospital gown     [place] minimum assist (75% or more patient effort);1 person assist     Safety/Cues increased time to complete;minimal;verbal cues     Position edge of bed sitting     Adaptive Equipment none     Comment pt demo'd  ability to thread sleeves of gown up BUE, assist to adjust and tie gown around backside seated at EOB        Grooming    [place] supervision     Position edge of bed sitting     Adaptive Equipment none     Comment pt demo'd appropriate ROM for grooming tasks, assist for set up and cueing required to complete        Balance    Static Sitting Balance WFL;sitting, edge of bed     Dynamic Sitting Balance WFL;sitting, edge of bed     Sit to Stand Dynamic Balance mild impairment;supported     Static Standing Balance supported;mild impairment     Dynamic Standing Balance mild impairment;supported     Balance Interventions occupation based/functional task     Comment, Balance minAx1-2 in standing, RW for support        Impairments/Safety Issues    Impairments Affecting Function balance;endurance/activity tolerance;strength     Functional Endurance +OH, mild symptoms     Safety Issues Affecting Function insight into deficits/self-awareness                                    Education Documentation  Self-Care, taught by Porsha Rodriguez, LEILA at 10/17/2024  2:00 PM.  Learner: Patient  Readiness: Acceptance  Method: Explanation  Response: Verbalizes Understanding, Needs Reinforcement  Comment: role of OT, goals, safety with mobility and ADLs, fall prevention        Session Outcome  Patient reclined, in bed (on stretcher with transport team) at end of session, all needs met. Nursing notified about change in vital signs, patient's position, patient's response to therapy/activity, and patient's performance.    AM-PAC™ - ADL (Current Function)     Putting on/taking off regular lower body clothing 2 - A Lot   Bathing 2 - A Lot   Toileting 1 - Total   Putting on/taking off regular upper body clothing 3 - A Little   Help for taking care of personal grooming 3 - A Little   Eating meals 3 - A Little   AM-PAC™ ADL Score 14      ASSESSMENT AND PLAN     Progress Summary  OT jovanni completed, pt currently requiring SUP for bed  mobility, minAx1-2 for functional transfers and steps at bedside with RW. Pt limited by +OH and mild symptoms, unsafe to attempt further mobility/transfers at time of eval. Pt able to demo BADLs at EOB, SUP-Robbi with cueing throughout. Recommending pt d/c home with cont. 24/7 HHA and HHOT    Patient/Family Therapy Goal Statement: none stated    OT Plan      Flowsheet Row Most Recent Value   Rehab Potential good, to achieve stated therapy goals at 10/17/2024 1024   Therapy Frequency 4 times/wk at 10/17/2024 1024   Planned Therapy Interventions activity tolerance training, BADL retraining, patient/caregiver education/training, ROM/therapeutic exercise, transfer/mobility retraining, strengthening exercise, occupation/activity based interventions, functional balance retraining at 10/17/2024 1024            OT Discharge Recommendations      Flowsheet Row Most Recent Value   OT Recommended Discharge Disposition home with assistance, home with home health at 10/17/2024 1024   Anticipated Equipment Needs if Discharged Home (OT) none  [pt has all recommended equipment] at 10/17/2024 1024                 OT Goals      Flowsheet Row Most Recent Value   Bed Mobility Goal 1    Activity/Assistive Device bed mobility activities, all at 10/17/2024 1024   [place] modified independence at 10/17/2024 1024   Time Frame by discharge at 10/17/2024 1024   Progress/Outcome goal ongoing at 10/17/2024 1024   Transfer Goal 1    Activity/Assistive Device all transfers at 10/17/2024 1024   [place] supervision required at 10/17/2024 1024   Time Frame by discharge at 10/17/2024 1024   Progress/Outcome goal ongoing at 10/17/2024 1024   Dressing Goal 1    Activity/Adaptive Equipment dressing skills, all at 10/17/2024 1024   [place] minimum assist (75% or more patient effort) at 10/17/2024 1024   Time Frame by discharge at 10/17/2024 1024   Progress/Outcome goal ongoing at 10/17/2024 1024   Toileting Goal 1    Activity/Assistive  Device toileting skills, all at 10/17/2024 1024   [place] minimum assist (75% or more patient effort) at 10/17/2024 1024   Time Frame by discharge at 10/17/2024 1024   Progress/Outcome goal ongoing at 10/17/2024 1024

## 2024-10-17 NOTE — PLAN OF CARE
Plan of Care Review  Plan of Care Reviewed With: patient  Progress: no change  Outcome Evaluation: pt aaox3, vss, no complaints of pain. pt tolerating clears, colostomy bag changed blood seen in bag, provider made aware. CBC sent. call bell within reach, urinal within reach, bed in low and alarmed.

## 2024-10-17 NOTE — ASSESSMENT & PLAN NOTE
Denies chest pain.  Well saturated on room air. Hemodynamically stable.  Suspect anemia from GI bleed contributed  Also has a known moderate-large left pleural effusion and small right pleural effusion.  CXR with stable pleural effusion, new patcy airspace dx in RLL.  No longer feeling short of breath and remains stable on RA, no cough or signs of infection, unlikely to have PNA.  Resolved.

## 2024-10-18 ENCOUNTER — ANESTHESIA EVENT (INPATIENT)
Dept: ENDOSCOPY | Facility: HOSPITAL | Age: 89
DRG: 393 | End: 2024-10-18
Payer: MEDICARE

## 2024-10-18 ENCOUNTER — ANESTHESIA (INPATIENT)
Dept: ENDOSCOPY | Facility: HOSPITAL | Age: 89
DRG: 393 | End: 2024-10-18
Payer: MEDICARE

## 2024-10-18 LAB
ANION GAP SERPL CALC-SCNC: 8 MEQ/L (ref 3–15)
ATRIAL RATE: 312
BUN SERPL-MCNC: 21 MG/DL (ref 7–25)
CALCIUM SERPL-MCNC: 7.8 MG/DL (ref 8.6–10.3)
CHLORIDE SERPL-SCNC: 111 MEQ/L (ref 98–107)
CO2 SERPL-SCNC: 22 MEQ/L (ref 21–31)
CREAT SERPL-MCNC: 1.4 MG/DL (ref 0.7–1.3)
EGFRCR SERPLBLD CKD-EPI 2021: 46.9 ML/MIN/1.73M*2
ERYTHROCYTE [DISTWIDTH] IN BLOOD BY AUTOMATED COUNT: 19.9 % (ref 11.6–14.4)
GLUCOSE SERPL-MCNC: 95 MG/DL (ref 70–99)
HCT VFR BLD AUTO: 22.9 % (ref 40.1–51)
HCT VFR BLD AUTO: 25 % (ref 40.1–51)
HCT VFR BLD AUTO: 28.2 % (ref 40.1–51)
HGB BLD-MCNC: 7.4 G/DL (ref 13.7–17.5)
HGB BLD-MCNC: 8 G/DL (ref 13.7–17.5)
HGB BLD-MCNC: 9.1 G/DL (ref 13.7–17.5)
MCH RBC QN AUTO: 31.4 PG (ref 28–33.2)
MCH RBC QN AUTO: 31.8 PG (ref 28–33.2)
MCH RBC QN AUTO: 32 PG (ref 28–33.2)
MCHC RBC AUTO-ENTMCNC: 32 G/DL (ref 32.2–36.5)
MCHC RBC AUTO-ENTMCNC: 32.3 G/DL (ref 32.2–36.5)
MCHC RBC AUTO-ENTMCNC: 32.3 G/DL (ref 32.2–36.5)
MCV RBC AUTO: 100 FL (ref 83–98)
MCV RBC AUTO: 97.2 FL (ref 83–98)
MCV RBC AUTO: 98.3 FL (ref 83–98)
PLATELET # BLD AUTO: 112 K/UL (ref 150–350)
PLATELET # BLD AUTO: 114 K/UL (ref 150–350)
PLATELET # BLD AUTO: 123 K/UL (ref 150–350)
PMV BLD AUTO: 9.5 FL (ref 9.4–12.4)
PMV BLD AUTO: 9.7 FL (ref 9.4–12.4)
PMV BLD AUTO: 9.8 FL (ref 9.4–12.4)
POTASSIUM SERPL-SCNC: 3.7 MEQ/L (ref 3.5–5.1)
QRS DURATION: 166
QT INTERVAL: 448
QTC CALCULATION(BAZETT): 520
R AXIS: 95
RBC # BLD AUTO: 2.33 M/UL (ref 4.5–5.8)
RBC # BLD AUTO: 2.5 M/UL (ref 4.5–5.8)
RBC # BLD AUTO: 2.9 M/UL (ref 4.5–5.8)
SODIUM SERPL-SCNC: 141 MEQ/L (ref 136–145)
T WAVE AXIS: -64
VENTRICULAR RATE: 81
WBC # BLD AUTO: 5.67 K/UL (ref 3.8–10.5)
WBC # BLD AUTO: 6.14 K/UL (ref 3.8–10.5)
WBC # BLD AUTO: 6.81 K/UL (ref 3.8–10.5)

## 2024-10-18 PROCEDURE — 93010 ELECTROCARDIOGRAM REPORT: CPT | Performed by: INTERNAL MEDICINE

## 2024-10-18 PROCEDURE — 99223 1ST HOSP IP/OBS HIGH 75: CPT | Mod: GC | Performed by: STUDENT IN AN ORGANIZED HEALTH CARE EDUCATION/TRAINING PROGRAM

## 2024-10-18 PROCEDURE — P9016 RBC LEUKOCYTES REDUCED: HCPCS

## 2024-10-18 PROCEDURE — 75000135 HC SIGMOIDOSCOPY W WO BRUSH: Performed by: INTERNAL MEDICINE

## 2024-10-18 PROCEDURE — 80048 BASIC METABOLIC PNL TOTAL CA: CPT | Performed by: PHYSICIAN ASSISTANT

## 2024-10-18 PROCEDURE — 63600000 HC DRUGS/DETAIL CODE: Performed by: NURSE ANESTHETIST, CERTIFIED REGISTERED

## 2024-10-18 PROCEDURE — 63700000 HC SELF-ADMINISTRABLE DRUG: Performed by: NURSE PRACTITIONER

## 2024-10-18 PROCEDURE — 93005 ELECTROCARDIOGRAM TRACING: CPT | Performed by: PHYSICIAN ASSISTANT

## 2024-10-18 PROCEDURE — 63600000 HC DRUGS/DETAIL CODE: Mod: JZ | Performed by: INTERNAL MEDICINE

## 2024-10-18 PROCEDURE — 99233 SBSQ HOSP IP/OBS HIGH 50: CPT | Performed by: INTERNAL MEDICINE

## 2024-10-18 PROCEDURE — 37000002 HC ANESTHESIA MAC: Performed by: INTERNAL MEDICINE

## 2024-10-18 PROCEDURE — 71000001 HC PACU PHASE 1 INITIAL 30MIN: Performed by: INTERNAL MEDICINE

## 2024-10-18 PROCEDURE — 36415 COLL VENOUS BLD VENIPUNCTURE: CPT | Performed by: PHYSICIAN ASSISTANT

## 2024-10-18 PROCEDURE — 99232 SBSQ HOSP IP/OBS MODERATE 35: CPT | Performed by: COLON & RECTAL SURGERY

## 2024-10-18 PROCEDURE — 21400000 HC ROOM AND CARE CCU/INTERMEDIATE

## 2024-10-18 PROCEDURE — 25800000 HC PHARMACY IV SOLUTIONS: Performed by: NURSE ANESTHETIST, CERTIFIED REGISTERED

## 2024-10-18 PROCEDURE — 0DJD8ZZ INSPECTION OF LOWER INTESTINAL TRACT, VIA NATURAL OR ARTIFICIAL OPENING ENDOSCOPIC: ICD-10-PCS | Performed by: INTERNAL MEDICINE

## 2024-10-18 PROCEDURE — 85027 COMPLETE CBC AUTOMATED: CPT | Performed by: PHYSICIAN ASSISTANT

## 2024-10-18 PROCEDURE — 63700000 HC SELF-ADMINISTRABLE DRUG: Performed by: INTERNAL MEDICINE

## 2024-10-18 PROCEDURE — 36430 TRANSFUSION BLD/BLD COMPNT: CPT

## 2024-10-18 RX ORDER — SODIUM CHLORIDE 9 MG/ML
INJECTION, SOLUTION INTRAVENOUS CONTINUOUS PRN
Status: DISCONTINUED | OUTPATIENT
Start: 2024-10-18 | End: 2024-10-18 | Stop reason: SURG

## 2024-10-18 RX ORDER — SODIUM CHLORIDE 9 MG/ML
5 INJECTION, SOLUTION INTRAVENOUS AS NEEDED
Status: ACTIVE | OUTPATIENT
Start: 2024-10-18 | End: 2024-10-19

## 2024-10-18 RX ORDER — PROPOFOL 10 MG/ML
INJECTION, EMULSION INTRAVENOUS CONTINUOUS PRN
Status: DISCONTINUED | OUTPATIENT
Start: 2024-10-18 | End: 2024-10-18 | Stop reason: SURG

## 2024-10-18 RX ORDER — DOBUTAMINE HYDROCHLORIDE 400 MG/100ML
INJECTION INTRAVENOUS CONTINUOUS PRN
Status: DISCONTINUED | OUTPATIENT
Start: 2024-10-18 | End: 2024-10-18 | Stop reason: SURG

## 2024-10-18 RX ADMIN — GUAIFENESIN 600 MG: 600 TABLET ORAL at 23:11

## 2024-10-18 RX ADMIN — SODIUM CHLORIDE: 9 INJECTION, SOLUTION INTRAVENOUS at 13:58

## 2024-10-18 RX ADMIN — PROPOFOL 40 MCG/KG/MIN: 10 INJECTION, EMULSION INTRAVENOUS at 14:04

## 2024-10-18 RX ADMIN — PANTOPRAZOLE SODIUM 40 MG: 40 INJECTION, POWDER, LYOPHILIZED, FOR SOLUTION INTRAVENOUS at 23:11

## 2024-10-18 RX ADMIN — LATANOPROST 1 DROP: 50 SOLUTION/ DROPS OPHTHALMIC at 23:11

## 2024-10-18 RX ADMIN — DOBUTAMINE IN DEXTROSE 2 MCG/KG/MIN: 400 INJECTION, SOLUTION INTRAVENOUS at 14:01

## 2024-10-18 RX ADMIN — LIDOCAINE 4% 1 PATCH: 40 PATCH TOPICAL at 09:07

## 2024-10-18 RX ADMIN — PANTOPRAZOLE SODIUM 40 MG: 40 INJECTION, POWDER, LYOPHILIZED, FOR SOLUTION INTRAVENOUS at 09:05

## 2024-10-18 RX ADMIN — GUAIFENESIN 600 MG: 600 TABLET ORAL at 00:16

## 2024-10-18 ASSESSMENT — COGNITIVE AND FUNCTIONAL STATUS - GENERAL
CLIMB 3 TO 5 STEPS WITH RAILING: 1 - TOTAL
STANDING UP FROM CHAIR USING ARMS: 2 - A LOT
WALKING IN HOSPITAL ROOM: 1 - TOTAL
MOVING TO AND FROM BED TO CHAIR: 2 - A LOT

## 2024-10-18 NOTE — ANESTHESIA PREPROCEDURE EVALUATION
Relevant Problems   CARDIOVASCULAR   (+) A-fib (CMS/HCC)   (+) Atrial fibrillation (CMS/HCC)   (+) Coronary artery disease   (+) Hypertension   (+) Ischemic congestive cardiomyopathy (CMS/HCC)      GASTROINTESTINAL   (+) Dysphagia   (+) GERD (gastroesophageal reflux disease)   (+) GI bleeding   (+) Gastrointestinal hemorrhage, unspecified gastrointestinal hemorrhage type      HEMATOLOGY   (+) Anemia due to chronic kidney disease   (+) Thrombocytopenia (CMS/HCC)      NEUROLOGY   (+) Depression      RESPIRATORY SYSTEM   (+) Shortness of breath      URINARY SYSTEM   (+) CALI (acute kidney injury) (CMS/[facility])      Other   (+) Hypothyroidism     89 y.o. 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     ROS/Med Hx     Past Surgical History   Procedure Laterality Date    cataract extraction right eye with implant and limbal relaxing incision Right 7/18/2024    Performed by Hayder Moses MD at  OR [facility]    Cataract extraction w/ intraocular lens implant Left     Cataract extraction with LRI and anterior vitrectomy left eye Left 11/16/2023    Performed by Hayder Moses MD at  OR [facility]    Cholecystectomy      Colostomy      COLOSTOMY LAPAROSCOPIC N/A 8/3/2022    [place] [facility] [place]    Coronary artery bypass graft      INCISION AND DRAINAGE WITH DEBRIDMENT OF BUTTOCK, AND PERINEUM N/A 8/2/2022    [place] [facility] [place]    Joint replacement Left     Knee    RIGHT HEART CATH N/A 8/4/2022    Performed by Cristal Lacey [place] [facility] [place]    Thyroidectomy      WASHOUT OF PERINEAL WOUND, COLONIC LAVAGE N/A 8/3/2022    [place] [facility] [place]     Technically difficult study.   The left ventricular cavity size is small with mild left ventricular hypertrophy and mildly reduced systolic function. Estimated EF 40-45% by visual estimate. Abnormal septal  motion due to RV volume overload. Unable to assess diastolic function due to technically difficult study. Low stroke volume (32 ml).   The aortic valve is tricuspid. Aortic valve and root sclerosis. Mild aortic regurgitation.   The mitral valve is thickened. Mild mitral annular calcification. Trace regurgitation.   The left atrium is normal in size.   The right ventricular cavity is massively dilated with severely decreased systolic function. Apical hypercontractility.   The pulmonic valve is not well seen. There is trace pulmonic regurgitation.  There is a 13 mm coaptation gap between the leaflets of the tricuspid valve resulting in torrential ( wide -open) tricuspid regurgitation (PISA EROA 2.4cm2, Rvol 96 mL). Tricuspid valve annulus is dilated and measures 6.8 cm in diameter. Unable to assess RVSP in the setting of torrential tricuspid regurgitation.   The right atrium is severely dilated.  The IVC is massively dilated and collapses less than 50% with inspiration consistent with severely elevated right atrial pressure.   No pericardial effusion. Large pleural effusion.   Compared to previous echocardiogram from 3/28/2024, no significant change.         Component  Ref Range & Units 10/18/24 0603 10/17/24 0350 10/16/24 0520 10/15/24 1212 10/3/24 1200 9/26/24 0527 9/25/24 1903     Sodium  136 - 145 mEQ/L 141 140 142 141 140 141 141    Potassium  3.5 - 5.1 mEQ/L 3.7 4.1 4.4 4.2 CM 4.4 3.7 3.9    Chloride  98 - 107 mEQ/L 111 High  110 High  112 High  110 High  107 104 105    CO2  21 - 31 mEQ/L 22 24 23 23 25 27 27    BUN  7 - 25 mg/dL 21 23 25 26 High  29 High  35 High  33 High     Creatinine  0.7 - 1.3 mg/dL 1.4 High  1.4 High  1.5 High  1.7 High  1.6 High  1.7 High  1.6 High     Glucose  70 - 99 mg/dL 95 84 98 115 High  111 High  148 High  112 High     Calcium  8.6 - 10.3 mg/dL 7.8 Low  8.2 Low  7.8 Low  8.5 Low  8.6 8.4 Low  8.4 Low          Component  Ref Range & Units 10/18/24 0603 10/18/24 0033 10/17/24 8182  10/17/24 1027 10/17/24 0350 10/16/24 1818 10/16/24 1318     WBC  3.80 - 10.50 K/uL 5.67 6.14 5.23 4.99 4.64 5.20 4.90    RBC  4.50 - 5.80 M/uL 2.33 Low  2.50 Low  2.26 Low  2.51 Low  2.60 Low  2.72 Low  2.69 Low     Hemoglobin  13.7 - 17.5 g/dL 7.4 Low  8.0 Low  7.4 Low  8.1 Low  8.5 Low  8.8 Low  8.7 Low     Hematocrit  40.1 - 51.0 % 22.9 Low  25.0 Low  22.9 Low  25.5 Low  26.3 Low  27.9 Low  27.1 Low     MCV  83.0 - 98.0 fL 98.3 High  100.0 High  101.3 High  101.6 High  101.2 High  102.6 High  100.7 High     MCH  28.0 - 33.2 pg 31.8 32.0 32.7 32.3 32.7 32.4 32.3    MCHC  32.2 - 36.5 g/dL 32.3 32.0 Low  32.3 31.8 Low  32.3 31.5 Low  32.1 Low     RDW  11.6 - 14.4 % 19.9 High  19.9 High  18.7 High  18.7 High  18.7 High  19.0 High  18.8 High     Platelets  150 - 350 K/uL 114 Low  123 Low  115 Low  110 Low  122 Low  106 Low  118 Low        Physical Exam    Anesthesia Plan    Plan: MAC    Technique: total IV anesthesia     Lines and Monitors: PIV and additional IV     Airway: natural airway / supplemental oxygen    4 ASA  Blood Products:      Consented to blood products  Anesthetic plan and risks discussed with: adult children  Induction:    intravenous   Postop Plan:   Patient Disposition: inpatient floor planned admission  Comments:    Plan: Risks and benefits of MAC and general anesthesia discussed with patient at bedside pre-operatively. Risks include, but are not limited to, aspiration and need for emergent intubation, sore throat, pneumonia, risk of injury to lips/teeth/mouth/tongue/throat, risk of possible arterial line (risk of infection/bleeding/injury to blood vessel or nerves), risk of injury to any organ in the body including heart/kidneys/liver/lungs/brain/etc.  Patient understands that there may be periods of awareness.  All questions answered, and the patient states understanding of anesthesia plan and wishes to proceed.    Discussed with his Son risks of needed to convert to general anesthesia,  endotracheal tube placement and possible arterial line. He has significant RV dysfunction will run dobutamine through induction, and to support the heart.     He received a unit of blood this morning .  The surgical team discussed reversing the DNR with his son perioperatively.

## 2024-10-18 NOTE — ANESTHESIOLOGIST PRE-PROCEDURE ATTESTATION
Pre-Procedure Patient Identification:  I am the Primary Anesthesiologist and have identified the patient on 10/18/24 at 1:59 PM.   I have confirmed the procedure(s) will be performed by the following surgeon/proceduralist Maine Lopez MD.

## 2024-10-18 NOTE — PLAN OF CARE
Care Coordination Discharge Plan Note     Discharge Needs Assessment  Concerns to be Addressed: denies needs/concerns at this time  Current Discharge Risk: chronically ill, dependent with mobility/activities of daily living    Anticipated Discharge Plan  Anticipated Discharge Disposition: home with home health  Type of Home Care Services: home health aide, home OT, home PT, nursing      Patient Choice  Offered/Gave Vendor List: yes  Patient's Choice of Community Agency(s): [facility]    Patient and/or patient guardian/advocate was made aware of their right to choose a provider. A list of eligible providers was presented and reviewed with the patient and/or patient guardian/advocate in written and/or verbal form. The list delineates providers in the patient’s desired geographic area who are participating in the Medicare program and/or providers contracted with the patient’s primary insurance. The Medicare list and quality ratings were obtained from the Medicare.gov [medicare.gov] website.    ---------------------------------------------------------------------------------------------------------------------    Interdisciplinary Discharge Plan Review:  Participants:, physical therapy, advanced practice provider, physician, nursing, social work/services, occupational therapy    Concerns Comments: Patient discussed in CPR. For flex sig today, then continue to monitor for bleeding. May be stable Sunday vs Monday. [facility] following to resume services, has 24-hour private HHA. Family has expressed that they generally transport him home.    Discharge Plan:   Disposition/Destination: Home Health Care - ML / Home  Discharge Facility:    Community Resources:      Discharge Transportation:  Is Out of Hospital DNR needed at Discharge:    Does patient need discharge transport? No

## 2024-10-18 NOTE — PROGRESS NOTES
[facility] (Minimally Invasive Surgery, Breast Surgery, [facility]) Daily Progress Note  Pager: [phone]    Subjective     89 y.o. 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     Patient laying in bed covered by blankets. Patient refused abdominal exam on two separate encounters this am despite gentle counseling, physically pushing examiner away. Patient stating he should not be interrupted while sleeping or while eating. Says he understands he is in the hospital but there seems to be a touch of confusion as he is talking about political action plans, our candidacy, and that he is not voting for us.     He is rec'ing 1u prbc this morning, am Hgb 7.4 (7.4 10/17 evening)     Objective     Vital signs in last 24 hours:  Temp:  [36.3 °C (97.3 °F)-36.8 °C (98.3 °F)] 36.3 °C (97.4 °F)  Heart Rate:  [78-94] 87  Resp:  [16-18] 16  BP: ()/(26-67) 115/59      Intake/Output Summary (Last 24 hours) at 10/18/2024 0919  Last data filed at 10/18/2024 0800  Gross per 24 hour   Intake 360 ml   Output 2140 ml   Net -1780 ml     Intake/Output this shift:  I/O this shift:  In: 10 [I.V.:10]  Out: -     Physical Exam    General: Well appearing no acute distress  HEENT: Normocephalic, trachea midline, JVD  CV: Hypotensive, RRR  Pulm: Unlabored respirations, on room air  Abd: Patient adamantly refused exam x 2        Labs  CBC Results         10/18/24 10/18/24 10/17/24     0603 0033 1659    WBC 5.67 6.14 5.23    RBC 2.33 2.50 2.26    HGB 7.4 8.0 7.4    HCT 22.9 25.0 22.9    MCV 98.3 100.0 101.3    MCH 31.8 32.0 32.7    MCHC 32.3 32.0 32.3     123 115          CMP Results         10/18/24 10/17/24 10/16/24     0603 0350 0520     140 142    K 3.7 4.1 4.4    Cl 111 110 112    CO2 22 24 23    Glucose 95 84 98    BUN 21 23 25    Creatinine 1.4 1.4 1.5    Calcium 7.8 8.2 7.8    Anion Gap 8 6 7    EGFR 46.9 46.9  43.1           Comment for EGFR at 0603 on 10/18/24: Calculation based on the Chronic Kidney Disease Epidemiology Collaboration (CKD-EPI) equation refit without adjustment for race.    Comment for EGFR at 0350 on 10/17/24: Calculation based on the Chronic Kidney Disease Epidemiology Collaboration (CKD-EPI) equation refit without adjustment for race.    Comment for EGFR at 0520 on 10/16/24: Calculation based on the Chronic Kidney Disease Epidemiology Collaboration (CKD-EPI) equation refit without adjustment for race.            Assessment/Plan     89 y.o. 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       Plan:  - Agree with GI colonoscopy through ostomy today in light of ongoing intermittent clot passage and small volume dark red output in bag   - F/u pre-op CV risk stratification   - No acute surgical intervention indicated at this time, surgery will continue to follow   - Further care per primary    S/p 2u prbc since admission      Please page [phone] with any questions/concerns.  SOPHIA Taylor    Addendum: Patient allowed exam on third attempt. Abdomen soft, nontender, nondistended. Ostomy protruding somewhat again, reducible. Bag without bright or dark blood, without any clots. Light brown liquid output.

## 2024-10-18 NOTE — PROGRESS NOTES
Patient: Samy Elena  Location: [facility] END[facility]  MRN:  [id number]  Today's date:  10/18/2024    Attempted to see patient for therapy. Unable due to patient at test or procedure. Pt at endoscopy for flex sig. Will reschedule.

## 2024-10-18 NOTE — CONSULTS
Cardiology  Consult Note     ASSESSMENT AND RECOMMENDATIONS     #Preoperative risk stratification  #Torrential TR  #GIB  Planned for colonoscopy in s/o recurrent GIB.  Preoperative Cardiovascular Risk Stratification: He is proposed for colonoscopy on 10/18. Patient reports functional capacity <4 METS. He denies angina. There are no current symptoms or exam findings consistent with heart failure. There is no evidence for unstable arrhythmias. He  is of significantly elevated, but not prohibitive cardiovascular risk ([place]: 4.0%) for perioperative cardiovascular event and may proceed to the OR without further cardiovascular testing. Benefits outweigh risks.  - Given high risk nature of patient's torrential TR, should have cardiac anesthesia present for surgery, should have a-line placed for close hemodynamic monitoring, should avoid significant hemodynamic shifts and avoid hypotension     Final recommendations are pending attending co-signature. Please page Cardiology at [phone] with any questions.     Jovon Boyle MD  Cardiology Fellow PGY4    SUBJECTIVE     Admission Date: 10/15/2024   Consult Date: 10/18/24  Reason for Consult: Pre op risk stratification    HPI  Samy Elena is a 89 y.o. 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  Cardiology is consulted for preoperative restratification in anticipation of upcoming colonoscopy.    On 10/15 after his caregiver noticed blood coming out of his ostomy.  CTA showed acute GI bleed within the colon just proximal to the left lower quadrant colostomy. IR was consulted and patient underwent Subselective SMA arteriography, subselective MARIE arteriography, wire dissection at the origin of the sigmoid artery on 10/16. On 10/17, more bleeding was noted and patient was  seen by GI who recommended colonoscopy through ostomy, planned for today 10/18.    In terms of cardiac history, he follows with Dr. Jeffrey at [facility] for CHF, TR, CAD and afib. He has a history of CABG in 1996 but has had no angina recently. He has a history of CHF exacerbations, functional capacity is limited.    EKG reviewed: Afib, RBBB  Telemetry reviewed: No significant events  Outpatient Cardiologist: Dr. Roshan Jeffrey  ROS: 14-point ROS completed and negative unless otherwise noted above.    OBJECTIVE     PHYSICAL EXAM  Physical Exam  Constitutional:       General: He is not in acute distress.  Neck:      Comments: No JVD  Cardiovascular:      Rate and Rhythm: Normal rate. Rhythm irregular.      Comments: Holosystolic murmur  Pulmonary:      Effort: Pulmonary effort is normal. No respiratory distress.      Breath sounds: No wheezing or rales.   Musculoskeletal:      Right lower leg: No edema.      Left lower leg: No edema.   Skin:     General: Skin is warm.   Neurological:      Mental Status: He is alert.   Psychiatric:         Mood and Affect: Mood normal.           Intake/Output Summary (Last 24 hours) at 10/18/2024 0659  Last data filed at 10/18/2024 0100  24 Hour Net Input/Output from 7AM Yesterday   Intake 350 ml   Output 2445 ml   Net -2095 ml     Weights (last 5 days)       Date/Time Weight    10/15/24 1207 69.5 kg (153 lb 4.8 oz)            LABS  Lab Results   Component Value Date    HSTROPONINI 29.3 (H) 09/24/2024    HSTROPONINI 25.1 (H) 09/23/2024    HSTROPONINI 23.2 (H) 07/28/2024     (H) 09/23/2024     (H) 07/28/2024     (H) 03/25/2024    CKTOTAL 78 08/02/2022     Lab Results   Component Value Date    O2SVEN 80 (H) 08/10/2022    O2SVEN 60 08/09/2022    O2SVEN 72 (H) 08/09/2022    LACTATEART 0.9 08/12/2022    LACTATEART 0.8 08/11/2022    LACTATEART 1.4 08/11/2022    LACTATE 1.9 07/28/2024    LACTATE 2.1 (H) 07/28/2024    LACTATE 2.0 03/25/2024     Results from  last 7 days   Lab Units 10/18/24  0603 10/17/24  0350 10/16/24  0520   SODIUM mEQ/L 141 140 142   POTASSIUM mEQ/L 3.7 4.1 4.4   CHLORIDE mEQ/L 111* 110* 112*   CO2 mEQ/L 22 24 23   BUN mg/dL 21 23 25   CREATININE mg/dL 1.4* 1.4* 1.5*   EGFR mL/min/1.73m*2 46.9* 46.9* 43.1*   GLUCOSE mg/dL 95 84 98         Results from last 7 days   Lab Units 10/18/24  0603 10/17/24  0350 10/16/24  0520   CALCIUM mg/dL 7.8* 8.2* 7.8*   MAGNESIUM mg/dL  --  1.9  --      Results from last 7 days   Lab Units 10/18/24  0603 10/18/24  0033 10/17/24  1659   WBC K/uL 5.67 6.14 5.23   HEMOGLOBIN g/dL 7.4* 8.0* 7.4*   HEMATOCRIT % 22.9* 25.0* 22.9*   PLATELETS K/uL 114* 123* 115*     Lab Results   Component Value Date    CHOL 105 08/04/2023    HDL 27 (L) 08/04/2023    LDLCALC 60 08/04/2023    TRIG 89 08/04/2023    TSH 6.26 (H) 07/28/2024    HGBA1C 6.4 (H) 08/03/2023       IMAGING  X-RAY CHEST 1 VIEW    Result Date: 10/17/2024  IMPRESSION: Moderate to large layering left pleural effusion, increased from the most recent chest radiograph from 9-24-24 and similar to the chest radiograph from 9/23/2024. Patchy airspace disease in the right lower lung zone, new from the prior.     CT ANGIOGRAPHY ABDOMEN PELVIS WITH AND WITHOUT IV CONTRAST    Result Date: 10/17/2024  IMPRESSION: Active GI bleed is noted involving the colon just proximal to the colostomy. This is similar location to the prior study however is not as pronounced. Finding:    Active GI bleeding   Acuity: Critical  Status:  CLOSED Critical read back was performed and results were read back by Cinda from [facility] [place], on 10/17/2024 11:21 AM     IR EMBOLIZATION    Result Date: 10/15/2024  IMPRESSION:   Successful subselective SMA and MARIE arteriography without evidence of discrete active bleed or pseudoaneurysm. Successful wire dissection was achieved, at the origin of the proximal sigmoid artery, accounting for the region of bleed noted on CTA. This should serve to limit flow to the distal  sigmoid artery branch and allow for healing of a diverticular bleed over the next 24 to 48 hours. Further coil embolization was therefore not pursued. Following the procedure, the patient's bleeding into his colostomy bag notably decreased significantly. The patient denied any abdominal pain following the procedure. COMMENT: PROCEDURE: 1. Ultrasound-guided access of the right common femoral artery. 2. Subselective SMA arteriography. 3. Subselective MARIE arteriography. 4. Superselective sigmoid arteriography with wire dissection. 5. Arterial closure, utilizing a Mynx device. RADIOLOGIST: Brian Mcgarry MD ANESTHESIA:  Local 1% lidocaine. REFERENCE AIR KERMA: 114 mGy CONTRAST: 30  cc of Isovue 300 MEDICATIONS:  none DESCRIPTION OF PROCEDURE:    Written informed consent was obtained following explanation of risks and benefits of the procedure to the patient's son/POA. The patient was placed supine on the IR procedure table. Ultrasound-guided vascular access - The right groin was prepped and draped in the usual sterile fashion. Grayscale ultrasound evaluation demonstrated the right common femoral artery to be patent. Under real-time ultrasound guidance, the right common femoral artery was accessed with a 21-gauge needle. An ultrasound-guided access image was saved to PACS. An 018 wire was advanced centrally. Sequential exchange was made for a 5 [place] microcatheter sheath, 035 wire, and 5 [place] vascular sheath. The vascular sheath was connected to a pressurized saline flush system. Performance Met:  PQRS MEASURE 76, CPT 6030F:  All elements of maximal sterile technique were utilized, including cap, mask, sterile gown, sterile gloves, and sterile drape.  Additionally, sterile ultrasound techniques were followed, including use of sterile probe cover and sterile ultrasound gel. Utilizing a Contra 2 catheter, the SMA was subselected and subselective SMA arteriography was performed. There is no evidence of active bleed or  pseudoaneurysm. Utilizing a Contra 2, the MARIE was then subselected and subselective MARIE arteriography was performed. There is no evidence of active bleed or pseudoaneurysm. In correlating with the recent CTA, the territory of bleed correlated with the sigmoidal artery. Therefore, utilizing Progreat microcatheter with double angle GT microwire, the proximal sigmoidal artery was subselected. Notably, due to the patient's vascular tortuosity and vascular fragility, spasm/wire dissection of the proximal sigmoidal artery was achieved. Therefore, as this should serve as a temporary embolization of the proximal sigmoid artery and allow for healing of the diverticular bleed. Therefore, further coil embolization was not pursued. Arterial closure and hemostasis was achieved, utilizing a Mynx device. Dry sterile dressing was applied. Notably, at the conclusion of the procedure, there was significantly decreased blood within the patient's colostomy bag, compatible with a successful procedure. The patient denied any abdominal pain at the conclusion of the procedure. The number of guidewires used, and their integrity, was confirmed at the end of the procedure.    ULTRASOUND GUIDED VASCULAR ACCESS    Result Date: 10/15/2024  IMPRESSION:   Successful subselective SMA and MARIE arteriography without evidence of discrete active bleed or pseudoaneurysm. Successful wire dissection was achieved, at the origin of the proximal sigmoid artery, accounting for the region of bleed noted on CTA. This should serve to limit flow to the distal sigmoid artery branch and allow for healing of a diverticular bleed over the next 24 to 48 hours. Further coil embolization was therefore not pursued. Following the procedure, the patient's bleeding into his colostomy bag notably decreased significantly. The patient denied any abdominal pain following the procedure. COMMENT: PROCEDURE: 1. Ultrasound-guided access of the right common femoral artery. 2.  Subselective SMA arteriography. 3. Subselective MARIE arteriography. 4. Superselective sigmoid arteriography with wire dissection. 5. Arterial closure, utilizing a Mynx device. RADIOLOGIST: Brian Mcgarry MD ANESTHESIA:  Local 1% lidocaine. REFERENCE AIR KERMA: 114 mGy CONTRAST: 30  cc of Isovue 300 MEDICATIONS:  none DESCRIPTION OF PROCEDURE:    Written informed consent was obtained following explanation of risks and benefits of the procedure to the patient's son/POA. The patient was placed supine on the IR procedure table. Ultrasound-guided vascular access - The right groin was prepped and draped in the usual sterile fashion. Grayscale ultrasound evaluation demonstrated the right common femoral artery to be patent. Under real-time ultrasound guidance, the right common femoral artery was accessed with a 21-gauge needle. An ultrasound-guided access image was saved to PACS. An 018 wire was advanced centrally. Sequential exchange was made for a 5 [place] microcatheter sheath, 035 wire, and 5 [place] vascular sheath. The vascular sheath was connected to a pressurized saline flush system. Performance Met:  PQRS MEASURE 76, CPT 6030F:  All elements of maximal sterile technique were utilized, including cap, mask, sterile gown, sterile gloves, and sterile drape.  Additionally, sterile ultrasound techniques were followed, including use of sterile probe cover and sterile ultrasound gel. Utilizing a Contra 2 catheter, the SMA was subselected and subselective SMA arteriography was performed. There is no evidence of active bleed or pseudoaneurysm. Utilizing a Contra 2, the MARIE was then subselected and subselective MARIE arteriography was performed. There is no evidence of active bleed or pseudoaneurysm. In correlating with the recent CTA, the territory of bleed correlated with the sigmoidal artery. Therefore, utilizing Progreat microcatheter with double angle GT microwire, the proximal sigmoidal artery was subselected. Notably, due  to the patient's vascular tortuosity and vascular fragility, spasm/wire dissection of the proximal sigmoidal artery was achieved. Therefore, as this should serve as a temporary embolization of the proximal sigmoid artery and allow for healing of the diverticular bleed. Therefore, further coil embolization was not pursued. Arterial closure and hemostasis was achieved, utilizing a Mynx device. Dry sterile dressing was applied. Notably, at the conclusion of the procedure, there was significantly decreased blood within the patient's colostomy bag, compatible with a successful procedure. The patient denied any abdominal pain at the conclusion of the procedure. The number of guidewires used, and their integrity, was confirmed at the end of the procedure.    CT ANGIOGRAPHY ABDOMEN PELVIS WITH AND WITHOUT IV CONTRAST    Result Date: 10/15/2024  IMPRESSION: 1. Acute GI bleed within the colon just proximal to the left lower quadrant colostomy. 2. Age-indeterminate L1 vertebral body compression fracture, new since July 2024. Recommend correlation with acute back pain. 3. Moderate to large left pleural effusion. Finding:    Active GI bleeding   Acuity: Critical  Status:  CLOSED Critical read back was performed and results were read back by Dr. Suh on 10/15/2024 at 7:27 PM.     IR THORACENTESIS    Result Date: 9/24/2024  IMPRESSION: Successful ultrasound-guided left thoracentesis with 900 mL removed and sent to the lab.  All components of the time-out, debriefing, and handling of the specimen were conducted as per the ASAP Specimen Protocol. I certify that I have personally reviewed this examination and agree with Betsy Benitez's report. Elio Espinoza M.D.    X-RAY CHEST 1 VIEW    Result Date: 9/24/2024  IMPRESSION: Evaluation limited by positioning with the apices excluded on both views. Status post left thoracentesis without appreciable pneumothorax. Moderate residual left effusion with underlying atelectasis/consolidative  opacity and small right effusion.  Assessment of the right chest is further limited by the patient's hand which overlies the hemithorax.     X-RAY CHEST 1 VIEW    Result Date: 9/24/2024  IMPRESSION: New moderate right effusion and worsening large left pleural effusion with increasing airspace disease in the left upper lobe and vascular congestion. Likely related to decompensated heart failure/CHF, with superimposed pneumonia not excluded in the appropriate setting.     CT HEAD WITHOUT IV CONTRAST    Result Date: 9/23/2024  IMPRESSION: No evidence of hemorrhage, mass or acute territorial infarction by CT criteria. COMMENT: Technique: Computed tomography of the brain was performed utilizing contiguous 2.5 mm transaxial sections without intravenous contrast administration. CT DOSE:  One or more dose reduction techniques (e.g. automated exposure control, adjustment of the mA and/or kV according to patient size, use of iterative reconstruction technique) utilized for this examination. Comparison studies: Head CT 7/28/2024. Postsurgical change: None. Brain parenchyma: There is no intraparenchymal hemorrhage, mass effect, midline shift or extra-axial fluid collection. White matter changes: Normal for age Ventricles, cisterns, and sulci: Normal in size and configuration. Sella: Normal in appearance. Cerebellar tonsils: Normal. Calvarium and extra cranial soft tissues: Normal. Paranasal sinuses: Clear bilaterally. Mastoid air cell: Normal. Orbits: Normal.      CARDIAC EXAMS  Most recent Echo results:    TRANSTHORACIC ECHO (TTE) COMPLETE 09/24/2024 (Final) 9/24/2024    Interpretation Summary  Technically difficult study.    The left ventricular cavity size is small with mild left ventricular hypertrophy and mildly reduced systolic function. Estimated EF 40-45% by visual estimate. Abnormal septal motion due to RV volume overload. Unable to assess diastolic function due to technically difficult study. Low stroke volume (32  ml).    The aortic valve is tricuspid. Aortic valve and root sclerosis. Mild aortic regurgitation.    The mitral valve is thickened. Mild mitral annular calcification. Trace regurgitation.    The left atrium is normal in size.    The right ventricular cavity is massively dilated with severely decreased systolic function. Apical hypercontractility.    The pulmonic valve is not well seen. There is trace pulmonic regurgitation.    There is a 13 mm coaptation gap between the leaflets of the tricuspid valve resulting in torrential ( wide -open) tricuspid regurgitation (PISA EROA 2.4cm2, Rvol 96 mL). Tricuspid valve annulus is dilated and measures 6.8 cm in diameter. Unable to assess RVSP in the setting of torrential tricuspid regurgitation.    The right atrium is severely dilated.    The IVC is massively dilated and collapses less than 50% with inspiration consistent with severely elevated right atrial pressure.    No pericardial effusion. Large pleural effusion.    Compared to previous echocardiogram from 3/28/2024, no significant change.              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)     Coronary artery disease     Disease of thyroid gland     Will catheter in place     6/25 not at present    GI (gastrointestinal bleed)     Hypertension     Myocardial infarction (CMS/HCC)     Orthostatic hypotension     TIA (transient ischemic attack)      Past Surgical History   Procedure Laterality Date    cataract extraction right eye with implant and limbal relaxing incision Right 7/18/2024    Performed by Hayder Moses MD at  OR [facility]    Cataract extraction w/ intraocular lens implant Left     Cataract extraction with LRI and anterior vitrectomy left eye Left 11/16/2023    Performed by Hayder Moses MD at  OR [facility]    Cholecystectomy       Colostomy      COLOSTOMY LAPAROSCOPIC N/A 8/3/2022    Performed by Mat Bryant MD at [facility] OR    Coronary artery bypass graft      INCISION AND DRAINAGE WITH DEBRIDMENT OF BUTTOCK, AND PERINEUM N/A 8/2/2022    Performed by Mat Bryant MD at [facility] OR    Joint replacement Left     Knee    RIGHT HEART CATH N/A 8/4/2022    Performed by Cristal Lacey MD at [facility] CARDIAC CATH/EP    Thyroidectomy      WASHOUT OF PERINEAL WOUND, COLONIC LAVAGE N/A 8/3/2022    Performed by Mat Bryant MD at [facility] OR     Social History     Socioeconomic History    Marital status:      Spouse name: None    Number of children: None    Years of education: None    Highest education level: None   Tobacco Use    Smoking status: Never    Smokeless tobacco: Never   Vaping Use    Vaping status: Never Used   Substance and Sexual Activity    Alcohol use: Not Currently     Comment: social    Drug use: Never    Sexual activity: Defer     Social Drivers of Health     Financial Resource Strain: Low Risk  (8/4/2023)    Overall Financial Resource Strain (CARDIA)     Difficulty of Paying Living Expenses: Not hard at all   Food Insecurity: No Food Insecurity (10/15/2024)    Hunger Vital Sign     Worried About Running Out of Food in the Last Year: Never true     Ran Out of Food in the Last Year: Never true   Transportation Needs: No Transportation Needs (10/16/2024)    PRAPARE - Transportation     Lack of Transportation (Medical): No     Lack of Transportation (Non-Medical): No   Housing Stability: Low Risk  (10/16/2024)    Housing Stability Vital Sign     Unable to Pay for Housing in the Last Year: No     Number of Times Moved in the Last Year: 0     Homeless in the Last Year: No     No family history on file.    ALLERGIES  Jardiance [empagliflozin]    HOME MEDICATIONS  Current Outpatient Medications   Medication Instructions    bimatoprost (LUMIGAN) 0.01 % ophthalmic drops 1 drop, Left Eye, Nightly    carboxymethylcellulose (REFRESH PLUS) 0.5  % dropperette 1 drop, Both Eyes, 4 times daily PRN    cefuroxime (CEFTIN) 500 mg, oral, 2 times daily    clopidogreL (PLAVIX) 75 mg, oral, Every morning    cyanocobalamin (VITAMIN B12) 500 mcg, oral, Every morning    escitalopram (LEXAPRO) 5 mg, oral, Every morning    famotidine (PEPCID) 10 mg, oral, Daily    finasteride (PROSCAR) 5 mg, oral, Daily    levothyroxine (SYNTHROID) 100 mcg, oral, Daily (6:30a)    methenamine (HIPREX) 1 g, oral, Daily before lunch    metoprolol succinate XL (TOPROL-XL) 12.5 mg, oral, Daily with dinner    tamsulosin (FLOMAX) 0.4 mg, oral, Daily    thiamine 100 mg, oral, Daily    torsemide (DEMADEX) 10 mg, oral, Daily       INPATIENT MEDICATIONS  Scheduled:   [Provider Managed Hold] clopidogreL  75 mg oral q AM    cyanocobalamin  500 mcg oral q AM    escitalopram  5 mg oral q AM    finasteride  5 mg oral Daily    guaiFENesin  600 mg oral BID    latanoprost  1 drop Left Eye Nightly    levothyroxine  100 mcg oral Daily (6:30a)    lidocaine  1 patch Topical Daily    [Provider Managed Hold] methenamine  1 g oral Daily    [Provider Managed Hold] metoprolol succinate ER  12.5 mg oral Daily with dinner    pantoprazole  40 mg intravenous q12h IVÁN    [Provider Managed Hold] tamsulosin  0.4 mg oral Daily    thiamine  100 mg oral Daily    [Provider Managed Hold] torsemide  10 mg oral Daily     Continuous Infusions:   [Provider Managed Hold] lactated ringer's   Stopped (10/17/24 1900)     PRN:    acetaminophen    carboxymethylcellulose    melatonin    sodium chloride 0.9 %    sodium chloride 0.9 %

## 2024-10-18 NOTE — PLAN OF CARE
Problem: Adult Inpatient Plan of Care  Goal: Plan of Care Review  Outcome: Progressing  Flowsheets (Taken 10/18/2024 1805)  Progress: improving  Outcome Evaluation: Mr. Elena was confused this morning, now more pleasant and oriented x3. Occasionally disoriented to situation. Tolerating clear liquid diet after his endoscopy procedure this afternoon. Son at bedside. Frequent turns/repositioning. Mepilex intact on upper back and sacrum, chair cushion utilized. Bed alarm on, call bell within reach, using urinal. Safety rounding performed.  Plan of Care Reviewed With: patient  Goal: Patient-Specific Goal (Individualized)  Outcome: Progressing  Goal: Absence of Hospital-Acquired Illness or Injury  Outcome: Progressing  Goal: Optimal Comfort and Wellbeing  Outcome: Progressing  Goal: Readiness for Transition of Care  Outcome: Progressing     Problem: Fall Injury Risk  Goal: Absence of Fall and Fall-Related Injury  Outcome: Progressing     Problem: Heart Failure Comorbidity  Goal: Maintenance of Heart Failure Symptom Control  Outcome: Progressing     Problem: Gastrointestinal Bleeding  Goal: Optimal Coping with Acute Illness  Outcome: Progressing  Goal: Hemostasis  Outcome: Progressing     Problem: Skin Injury Risk Increased  Goal: Skin Health and Integrity  Outcome: Progressing     Problem: Mobility Impairment  Goal: Optimal Mobility  Outcome: Progressing     Problem: Self-Care Deficit  Goal: Improved Ability to Complete Activities of Daily Living  Outcome: Progressing   Plan of Care Review  Plan of Care Reviewed With: patient  Progress: improving  Outcome Evaluation: Mr. Elena was confused this morning, now more pleasant and oriented x3. Occasionally disoriented to situation. Tolerating clear liquid diet after his endoscopy procedure this afternoon. Son at bedside. Frequent turns/repositioning. Mepilex intact on upper back and sacrum, chair cushion utilized. Bed alarm on, call bell within reach, using urinal. Safety  rounding performed.

## 2024-10-18 NOTE — OP NOTE
_______________________________________________________________________________  Patient Name: Samy Elena          Procedure Date: 10/18/2024 1:09 PM  MRN: [id number]                     Account Number: [id number]  YOB: 1930              Age: 89  Gender: Male                          Note Status: Finalized  Attending MD: PAPI CONDE MD,  _______________________________________________________________________________  Procedure:             Flexible Sigmoidoscopy  Indications:           Hematochezia through colostomy  Providers:             PAPI CONDE MD (Doctor), MARK LOVE MD (Fellow)  Referring MD:          ARTURO ORTIZ MD, MARK LOVE MD  Requesting Provider:  Medicines:             See the Anesthesia note for documentation of the  administered medications  Complications:         No immediate complications.  _______________________________________________________________________________  Procedure:             After obtaining informed consent, the endoscope was  passed under direct vision. Throughout the procedure,  the patient's blood pressure, pulse, and oxygen  saturations were monitored continuously. The  colonoscope was introduced through the ostomy and  advanced to the descending colon. After obtaining  informed consent, the endoscope was passed under  direct vision. Throughout the procedure, the patient's  blood pressure, pulse, and oxygen saturations were  monitored continuously. The flexible sigmoidoscopy was  accomplished without difficulty. The patient tolerated  the procedure well. The quality of the bowel  preparation was good.  Findings:  Stoma intact without signs of ischemia or strangulation  Many large-mouthed and small-mouthed diverticula were found in the left  colon.  Two tiny polyps were found in the descending colon. The polyps were  sessile. Polypectomy was not attempted due to recent plavix.  Impression:            Likely source of bloody ostomy output is  diverticular.  - Diverticulosis in the left colon.  - Two tiny polyps in the descending colon. Resection  not attempted due to recent plavix.  Recommendation:        - Advance diet as tolerated.  - Can resume plavix in 24hrs  - Discuss with cardiology regarding risk/benefits of  continuing plavix long-term, given likely diverticular  bleed  - If patient were to have continued ostomy output,  would call IR for potential further embolization  - Return to GI clinic in 3 months.  - [facility] to [facility].  Procedure Code(s):     --- Professional ---  54108, Sigmoidoscopy, flexible; diagnostic, including  collection of specimen(s) by brushing or washing, when  performed (separate procedure)  Diagnosis Code(s):     --- Professional ---  D12.4, Benign neoplasm of descending colon  K92.1, Melena (includes Hematochezia)  K57.30, Diverticulosis of large intestine without  perforation or abscess without bleeding  CPT copyright 2023 [employer]. All rights reserved.  The codes documented in this report are preliminary and upon  review may  be revised to meet current compliance requirements.  Attending Participation:  I was present and participated during the entire procedure, including  non-key portions.  _________________  PAPI CONDE MD  10/18/2024 6:01:48 PM  This report has been signed electronically.  Number of Addenda: 0  Note Initiated On: 10/18/2024 1:09 PM

## 2024-10-18 NOTE — PROGRESS NOTES
Hospital Medicine     Daily Progress Note       SUBJECTIVE     Patient seen and examined, no acute events overnight.  Awake and alert, oriented.  Comfortably resting in bed, well saturated on room air.    He feels well today, no longer having shortness of breath.  No further bleeding from his ostomy and bag has some loose brown stool.  Denies lightheadedness/dizziness, chest pain, abdominal pain, nausea.    Had a detailed discussion with patient's son Marko via phone, provided a medical update, addressed all questions and concerns.     OBJECTIVE      Vital signs in last 24 hours:  Temp:  [36.1 °C (97 °F)-37.1 °C (98.8 °F)] 36.4 °C (97.6 °F)  Heart Rate:  [72-94] 82  Resp:  [16-18] 16  BP: ()/(51-71) 119/66    Intake/Output Summary (Last 24 hours) at 10/18/2024 1548  Last data filed at 10/18/2024 1430  Gross per 24 hour   Intake 810 ml   Output 1660 ml   Net -850 ml       PHYSICAL EXAMINATION      Physical Exam  Vitals and nursing note reviewed.   Constitutional:       General: He is not in acute distress.     Comments: Awake and alert, oriented.  Thin, frail and chronically ill-appearing.  Pleasant demeanor.   HENT:      Head: Normocephalic and atraumatic.      Nose: Nose normal.      Mouth/Throat:      Mouth: Mucous membranes are moist.   Eyes:      Pupils: Pupils are equal, round, and reactive to light.   Cardiovascular:      Rate and Rhythm: Normal rate. Rhythm irregular.      Heart sounds: Murmur heard.      Comments: Grade III/VI murmur  Pulmonary:      Effort: Pulmonary effort is normal. No respiratory distress.      Breath sounds: No wheezing.      Comments: Diminished air entry in the left base.  Well saturated on room air.  Abdominal:      General: Bowel sounds are normal. There is no distension.      Palpations: Abdomen is soft.      Tenderness: There is no abdominal tenderness.      Comments: Ostomy pink and patent, slightly edematous less than yesterday.  No bleeding/blood in bag, bag with small  amount of loose brown stool.   Musculoskeletal:      Right lower leg: No edema.      Left lower leg: No edema.   Skin:     General: Skin is warm and dry.   Neurological:      Mental Status: He is alert and oriented to person, place, and time.   Psychiatric:         Mood and Affect: Mood normal.         Behavior: Behavior normal.         Thought Content: Thought content normal.        LINES, CATHETERS, DRAINS, AIRWAYS, AND WOUNDS   Lines, Drains, and Airways:  Wounds (agree with documentation and present on admission):  Peripheral IV (Adult) 10/15/24 Right Antecubital (Active)   Number of days: 3       Peripheral IV (Adult) 10/17/24 Anterior;Left Forearm (Active)   Number of days: 1         Comments:    LABS / IMAGING / TELE      Labs  Results from last 7 days   Lab Units 10/18/24  0603 10/17/24  0350 10/16/24  0520   SODIUM mEQ/L 141 140 142   POTASSIUM mEQ/L 3.7 4.1 4.4   CHLORIDE mEQ/L 111* 110* 112*   CO2 mEQ/L 22 24 23   BUN mg/dL 21 23 25   CREATININE mg/dL 1.4* 1.4* 1.5*   CALCIUM mg/dL 7.8* 8.2* 7.8*   GLUCOSE mg/dL 95 84 98     Results from last 7 days   Lab Units 10/18/24  0603 10/18/24  0033 10/17/24  1659   WBC K/uL 5.67 6.14 5.23   HEMOGLOBIN g/dL 7.4* 8.0* 7.4*   HEMATOCRIT % 22.9* 25.0* 22.9*   PLATELETS K/uL 114* 123* 115*           Imaging  I have independently reviewed the pertinent imaging from the last 24 hrs.    ECG/Telemetry  I have independently reviewed the telemetry. No events for the last 24 hours.    ASSESSMENT AND PLAN      * GI bleeding  Assessment & Plan  Presented with 1 day of bleeding from ostomy; does have history of diverticulosis and prior history of bleeding.  Not on A/C but is on Plavix.  Hemodynamically stable on admission.  CTA revealing acute GI bleed within the colon just proximal to the left lower quadrant colostomy.  IR performed successful subselective SMA and MARIE arteriography without evidence of discrete active bleed or pseudoaneurysm. Successful wire dissection at the  "origin of the sigmoidal artery, accounting for the territory of the bleed.   Suspect diverticular bleed.    10/17: Rebleed into the ostomy, darker red slow ooze/trickle with clots. Hemodynamically stable.  CTA with \"Active GI bleed is noted involving the colon just proximal to the colostomy. This is similar location to the prior study however is not as pronounced.\"  Discussed with both GI and IR. No IR intervention recommended as the site is close to the ostomy and does not appear to be from an arterial source. GI in agreement.   Hgb decreased to low 7s, s/p 2u pRBCs. Bleeding stopped.  S/p colonoscopy through ostomy 10/18: noted diverticuli and 2 polyps which were small and not removed.     -Hgb stable, checking at 18:00 and then BID.  Transfuse for Hgb <8.  -Continue IV PPI BID  -Restarted CLD, advance as tolerate  -Having soft-low normal BP, holding methenamine, metoprolol, tamsulosin and torsemide  -Continue holding clopidogrel until 10/20 per GI, per Cards should restart    History of recurrent UTIs  Assessment & Plan  Recently started on course of  bactrim and changed to cefuroxime after home urine dip stick positive.  Denies dysuria.  UA abnormal.    -Urine cx growing E. Coli, follow for sensitivities  -Hold on further antibx as is asymptomatic    Recurrent pleural effusion on left  Assessment & Plan  Last thoracentesis in 09/2024.  CT revealing moderate to large left pleural effusion.  Stable on RA.    -Monitor oxygen status closely  -Can consider repeat thoracentesis while admitted once acute issues resolved     Compression fracture of lumbar vertebra (CMS/HCC)  Assessment & Plan  CT with incidental finding of age-indeterminate L1 vertebral body compression fracture, new since July 2024.  Pain is minimal.    -Pain control with lidocaine patch and PRN acetaminophen   -PT/OT rec home    Shortness of breath  Assessment & Plan  Started having shortness of breath today. Denies chest pain.  Well saturated on room " air. Hemodynamically stable.  Currently with a GIB, blood oozing from his ostomy with stable anemia for now.  Also has a known moderate-large left pleural effusion and small right pleural effusion.  CXR with stable pleural effusion, new patcy airspace dx in RLL.  No longer feeling short of breath and remains stable on RA, no cough or signs of infection, unlikely to have PNA.  Resolved.      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  Presented for GIB and after large amount of blood was found in his ostomy bag.    -General surgery evaluated upon admission, recommending GIB work up     CKD (chronic kidney disease) stage 4, GFR 15-29 ml/min (CMS/[facility])  Assessment & Plan  Cr of 1.7 upon admission appears at baseline ~1.6-1.7.  Cr below baseline.    -Will monitor Cr closely s/p CTA  -Avoid nephrotoxic agents and renally dose meds    Depression  Assessment & Plan  -Continuing home lexapro 5mg qD     Hypothyroidism  Assessment & Plan  -Continuing home levothyroxine     Coronary artery disease  Assessment & Plan  S/p CABG.    -Holding home Plavix in setting of GI bleed  -Does not appear to be on a statin     Hypertension  Assessment & Plan  BP continue to be low-normal.    -Holding home Metop XL and Torsemide in setting of GI bleed         VTE Assessment: Padua    VTE Prophylaxis:  Current anticoagulants:    None      Code Status: DNR (A.N.D.)      Estimated Discharge Date: 10/21/2024   Disposition Planning: Continuing medical management.     SOPHIA Duvall  10/18/2024

## 2024-10-18 NOTE — ANESTHESIA POSTPROCEDURE EVALUATION
Patient: Samy Elena    Procedure Summary       Date: 10/18/24 Room / Location: [facility] 1 (A) / [facility]    Anesthesia Start: 1358 Anesthesia Stop: 1431    Procedure: DIAGNOSTIC FLEXIBLE SIGMOIDOSCOPY WITH COLLECTION OF SPECIMEN BY WASHING (Anus) Diagnosis:       Gastrointestinal hemorrhage, unspecified gastrointestinal hemorrhage type      (Gastrointestinal hemorrhage, unspecified gastrointestinal hemorrhage type [K92.2])    Providers: Maine Lopez MD Responsible Provider: Joe Paulino MD    Anesthesia Type: MAC ASA Status: 4            Anesthesia Type: MAC  PACU Vitals  10/18/2024 1424 - 10/18/2024 1439        10/18/2024  1430             BP: 104/57    Temp: 36.1 °C (97 °F)    Pulse: 82    Resp: 16    SpO2: 99 %              Anesthesia Post Evaluation    Pain management: adequate  Mode of pain management: IV medication  Patient location during evaluation: PACU  Patient participation: complete - patient participated  Level of consciousness: awake and alert  Cardiovascular status: acceptable  Airway Patency: adequate  Respiratory status: acceptable and nasal cannula  Hydration status: acceptable  Anesthetic complications: no  Comments: Evaluated in PACU, patient hemodynamically stable on room air with no complaints

## 2024-10-19 LAB
CROSSMATCH: NORMAL
CROSSMATCH: NORMAL
ERYTHROCYTE [DISTWIDTH] IN BLOOD BY AUTOMATED COUNT: 19.7 % (ref 11.6–14.4)
ERYTHROCYTE [DISTWIDTH] IN BLOOD BY AUTOMATED COUNT: 19.9 % (ref 11.6–14.4)
HCT VFR BLD AUTO: 26.5 % (ref 40.1–51)
HCT VFR BLD AUTO: 28.2 % (ref 40.1–51)
HGB BLD-MCNC: 8.7 G/DL (ref 13.7–17.5)
HGB BLD-MCNC: 9.1 G/DL (ref 13.7–17.5)
ISBT CODE: 5100
ISBT CODE: 5100
MCH RBC QN AUTO: 31.4 PG (ref 28–33.2)
MCH RBC QN AUTO: 31.9 PG (ref 28–33.2)
MCHC RBC AUTO-ENTMCNC: 32.3 G/DL (ref 32.2–36.5)
MCHC RBC AUTO-ENTMCNC: 32.8 G/DL (ref 32.2–36.5)
MCV RBC AUTO: 97.1 FL (ref 83–98)
MCV RBC AUTO: 97.2 FL (ref 83–98)
PLATELET # BLD AUTO: 109 K/UL (ref 150–350)
PLATELET # BLD AUTO: 112 K/UL (ref 150–350)
PMV BLD AUTO: 9.5 FL (ref 9.4–12.4)
PMV BLD AUTO: 9.7 FL (ref 9.4–12.4)
PRODUCT CODE: NORMAL
PRODUCT CODE: NORMAL
PRODUCT STATUS: NORMAL
PRODUCT STATUS: NORMAL
RBC # BLD AUTO: 2.73 M/UL (ref 4.5–5.8)
RBC # BLD AUTO: 2.9 M/UL (ref 4.5–5.8)
SPECIMEN EXP DATE BLD: NORMAL
SPECIMEN EXP DATE BLD: NORMAL
UNIT ABO: NORMAL
UNIT ABO: NORMAL
UNIT ID: NORMAL
UNIT ID: NORMAL
UNIT RH: POSITIVE
UNIT RH: POSITIVE
WBC # BLD AUTO: 5.85 K/UL (ref 3.8–10.5)
WBC # BLD AUTO: 6.02 K/UL (ref 3.8–10.5)

## 2024-10-19 PROCEDURE — 63700000 HC SELF-ADMINISTRABLE DRUG: Performed by: INTERNAL MEDICINE

## 2024-10-19 PROCEDURE — 99233 SBSQ HOSP IP/OBS HIGH 50: CPT | Performed by: INTERNAL MEDICINE

## 2024-10-19 PROCEDURE — 99232 SBSQ HOSP IP/OBS MODERATE 35: CPT | Performed by: COLON & RECTAL SURGERY

## 2024-10-19 PROCEDURE — 85027 COMPLETE CBC AUTOMATED: CPT | Performed by: PHYSICIAN ASSISTANT

## 2024-10-19 PROCEDURE — 63600000 HC DRUGS/DETAIL CODE: Mod: JZ | Performed by: INTERNAL MEDICINE

## 2024-10-19 PROCEDURE — 21400000 HC ROOM AND CARE CCU/INTERMEDIATE

## 2024-10-19 PROCEDURE — 99233 SBSQ HOSP IP/OBS HIGH 50: CPT | Performed by: STUDENT IN AN ORGANIZED HEALTH CARE EDUCATION/TRAINING PROGRAM

## 2024-10-19 PROCEDURE — 63700000 HC SELF-ADMINISTRABLE DRUG: Performed by: NURSE PRACTITIONER

## 2024-10-19 PROCEDURE — 36415 COLL VENOUS BLD VENIPUNCTURE: CPT | Performed by: PHYSICIAN ASSISTANT

## 2024-10-19 RX ORDER — TORSEMIDE 10 MG/1
10 TABLET ORAL ONCE
Status: COMPLETED | OUTPATIENT
Start: 2024-10-19 | End: 2024-10-19

## 2024-10-19 RX ADMIN — THIAMINE HCL TAB 100 MG 100 MG: 100 TAB at 09:42

## 2024-10-19 RX ADMIN — TORSEMIDE 10 MG: 10 TABLET ORAL at 14:44

## 2024-10-19 RX ADMIN — FINASTERIDE 5 MG: 5 TABLET, FILM COATED ORAL at 09:42

## 2024-10-19 RX ADMIN — LATANOPROST 1 DROP: 50 SOLUTION/ DROPS OPHTHALMIC at 21:19

## 2024-10-19 RX ADMIN — LEVOTHYROXINE SODIUM 100 MCG: 0.1 TABLET ORAL at 06:08

## 2024-10-19 RX ADMIN — LIDOCAINE 4% 1 PATCH: 40 PATCH TOPICAL at 09:56

## 2024-10-19 RX ADMIN — PANTOPRAZOLE SODIUM 40 MG: 40 INJECTION, POWDER, LYOPHILIZED, FOR SOLUTION INTRAVENOUS at 21:19

## 2024-10-19 RX ADMIN — Medication 500 MCG: at 09:42

## 2024-10-19 RX ADMIN — ESCITALOPRAM OXALATE 5 MG: 5 TABLET, FILM COATED ORAL at 09:42

## 2024-10-19 RX ADMIN — PANTOPRAZOLE SODIUM 40 MG: 40 INJECTION, POWDER, LYOPHILIZED, FOR SOLUTION INTRAVENOUS at 09:51

## 2024-10-19 RX ADMIN — GUAIFENESIN 600 MG: 600 TABLET ORAL at 09:42

## 2024-10-19 RX ADMIN — GUAIFENESIN 600 MG: 600 TABLET ORAL at 19:55

## 2024-10-19 RX ADMIN — METOPROLOL SUCCINATE 12.5 MG: 25 TABLET, EXTENDED RELEASE ORAL at 18:13

## 2024-10-19 NOTE — PLAN OF CARE
Plan of Care Review  Plan of Care Reviewed With: patient  Progress: improving  Outcome Evaluation: Pt. AAOx4, resting comfortably, no c/o pain.  Pt. OOB in chair during shift, good PO intake, colostomy output brown liquid.  FSP in place.

## 2024-10-19 NOTE — PROGRESS NOTES
[facility] (Minimally Invasive Surgery, Breast Surgery, [facility]) Daily Progress Note  Pager: [phone]    Subjective     89 y.o. M with colostomy (Dr. Bryant) for diversion s/p I&D for Fornier's gangrene (2022) c/f bloody stoma output     Patient underwent endoscopy yesterday, findings included diverticulosis of left colon 2 polyps in the descending colon, not removed due to recent Plavix.  Stoma intact without signs of ischemia strangulation.  Suspected that diverticulosis is contributing to ongoing GI bleed.    No acute events overnight blood pressure remains soft but otherwise vital signs stable, patient afebrile.  This morning patient feels well.  He reported that he had no blood in his ostomy bag overnight.  He feels hungry and ready to eat more solid food.    Objective     Vital signs in last 24 hours:  Temp:  [36.1 °C (97 °F)-37.1 °C (98.8 °F)] 36.8 °C (98.2 °F)  Heart Rate:  [72-92] 83  Resp:  [16-18] 18  BP: ()/(51-71) 92/51      Intake/Output Summary (Last 24 hours) at 10/19/2024 0458  Last data filed at 10/18/2024 1615  Gross per 24 hour   Intake 580 ml   Output 350 ml   Net 230 ml     Intake/Output this shift:  No intake/output data recorded.    Physical Exam    General: Well appearing no acute distress  HEENT: Normocephalic, trachea midline, JVD  CV: Hypotensive BP 92/51, RRR HR 83  Pulm: Unlabored respirations, on room air  Abd: Soft, nontender nondistended, ostomy pink protruding, no evidence of old blood or active blood in the ostomy bag  Neuro: Intact, responds to questions appropriately      Labs  CBC Results         10/18/24 10/18/24 10/17/24     0603 0033 1659    WBC 5.67 6.14 5.23    RBC 2.33 2.50 2.26    HGB 7.4 8.0 7.4    HCT 22.9 25.0 22.9    MCV 98.3 100.0 101.3    MCH 31.8 32.0 32.7    MCHC 32.3 32.0 32.3     123 115          CMP Results         10/18/24 10/17/24 10/16/24     0603 0350 0520     140 142    K 3.7 4.1 4.4    Cl 111 110 112    CO2 22 24 23     Glucose 95 84 98    BUN 21 23 25    Creatinine 1.4 1.4 1.5    Calcium 7.8 8.2 7.8    Anion Gap 8 6 7    EGFR 46.9 46.9 43.1           Comment for EGFR at 0603 on 10/18/24: Calculation based on the Chronic Kidney Disease Epidemiology Collaboration (CKD-EPI) equation refit without adjustment for race.    Comment for EGFR at 0350 on 10/17/24: Calculation based on the Chronic Kidney Disease Epidemiology Collaboration (CKD-EPI) equation refit without adjustment for race.    Comment for EGFR at 0520 on 10/16/24: Calculation based on the Chronic Kidney Disease Epidemiology Collaboration (CKD-EPI) equation refit without adjustment for race.            Assessment/Plan     89 y.o. 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       Plan:  - Follow-up GI recs  - No acute surgical intervention indicated at this time, surgery will continue to follow  - If any further concerns for bleeding recommend CTA  - Further care per primary    S/p 2u prbc since admission      Please page [phone] with any questions/concerns.  Leo Patton MD

## 2024-10-19 NOTE — PROGRESS NOTES
Hospital Medicine     Daily Progress Note       SUBJECTIVE   Interval History: Patient seen. No additional bleeding. Wants diet upgraded. Discussed with son via telephone and plan to resume clopidegrel 10/20. They are in agreement.      OBJECTIVE      Vital signs in last 24 hours:  Temp:  [36.1 °C (97 °F)-36.9 °C (98.4 °F)] 36.8 °C (98.2 °F)  Heart Rate:  [72-91] 87  Resp:  [16-18] 18  BP: ()/(51-71) 100/60    Intake/Output Summary (Last 24 hours) at 10/19/2024 1111  Last data filed at 10/18/2024 1615  Gross per 24 hour   Intake 570 ml   Output 200 ml   Net 370 ml       PHYSICAL EXAMINATION      Physical Exam    Vitals and nursing note reviewed.   Constitutional:       General: He is not in acute distress.     Comments: Awake and alert, oriented.  Thin, frail and chronically ill-appearing.  Pleasant demeanor.   HENT:      Head: Normocephalic and atraumatic.      Nose: Nose normal.      Mouth/Throat:      Mouth: Mucous membranes are moist.   Eyes:      Pupils: Pupils are equal, round, and reactive to light.   Cardiovascular:      Rate and Rhythm: Normal rate. Rhythm irregular.      Heart sounds: Murmur heard.      Comments: Grade III/VI murmur  Pulmonary:      Effort: Pulmonary effort is normal. No respiratory distress.      Breath sounds: No wheezing.      Comments: Diminished air entry in the left base.  Well saturated on room air.  Abdominal:      General: Bowel sounds are normal. There is no distension.      Palpations: Abdomen is soft.      Tenderness: There is no abdominal tenderness.      Comments: Ostomy pink and patent, slightly edematous less than yesterday.  No bleeding/blood in bag, mostly gas with scant liquid stool  Musculoskeletal:      Right lower leg: No edema.      Left lower leg: No edema.   Skin:     General: Skin is warm and dry.   Neurological:      Mental Status: He is alert and oriented to person, place, and time.   Psychiatric:         Mood and Affect: Mood normal.         Behavior:  Behavior normal.         Thought Content: Thought content normal.       LINES, CATHETERS, DRAINS, AIRWAYS, AND WOUNDS   Lines, Drains, and Airways:  Wounds (agree with documentation and present on admission):  Peripheral IV (Adult) 10/15/24 Right Antecubital (Active)   Number of days: 4       Peripheral IV (Adult) 10/17/24 Anterior;Left Forearm (Active)   Number of days: 2         Comments:    LABS / IMAGING / TELE      Labs  I have reviewed the patient's pertinent labs.  Significant abnormals are Hgb 9.1.      Imaging  I have independently reviewed the pertinent imaging from the last 24 hrs.    ECG/Telemetry  I have independently reviewed the telemetry. No events for the last 24 hours.    ASSESSMENT AND PLAN      Recurrent pleural effusion on left  Assessment & Plan  Last thoracentesis in 09/2024.  CT revealing moderate to large left pleural effusion.  Stable on RA.    -Monitor oxygen status closely  -Can consider repeat thoracentesis while admitted once acute issues resolved     Compression fracture of lumbar vertebra (CMS/HCC)  Assessment & Plan  CT with incidental finding of age-indeterminate L1 vertebral body compression fracture, new since July 2024.  Pain is minimal.    -Pain control with lidocaine patch and PRN acetaminophen   -PT/OT rec home    Shortness of breath  Assessment & Plan  Started having shortness of breath today. Denies chest pain.  Well saturated on room air. Hemodynamically stable.  Currently with a GIB, blood oozing from his ostomy with stable anemia for now.  Also has a known moderate-large left pleural effusion and small right pleural effusion.  CXR with stable pleural effusion, new patcy airspace dx in RLL.  No longer feeling short of breath and remains stable on RA, no cough or signs of infection, unlikely to have PNA.  Resolved.      History of recurrent UTIs  Assessment & Plan  Recently started on course of  bactrim and changed to cefuroxime after home urine dip stick positive.  Denies  "dysuria.  UA abnormal.    -Urine cx growing E. 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  Presented for GIB and after large amount of blood was found in his ostomy bag.    -General surgery evaluated upon admission, recommending GIB work up     CKD (chronic kidney disease) stage 4, GFR 15-29 ml/min (CMS/[facility])  Assessment & Plan  Cr of 1.7 upon admission appears at baseline ~1.6-1.7.  Cr below baseline.    -Will monitor Cr closely s/p CTA  -Avoid nephrotoxic agents and renally dose meds    Depression  Assessment & Plan  -Continuing home lexapro 5mg qD     Hypothyroidism  Assessment & Plan  -Continuing home levothyroxine     Coronary artery disease  Assessment & Plan  S/p CABG.    -Holding home Plavix in setting of GI bleed  -Does not appear to be on a statin     Hypertension  Assessment & Plan  BP continue to be low-normal.    -Holding home Metop XL and Torsemide in setting of GI bleed     * GI bleeding  Assessment & Plan  Presented with 1 day of bleeding from ostomy; does have history of diverticulosis and prior history of bleeding.  Not on A/C but is on Plavix.  Hemodynamically stable on admission.  CTA revealing acute GI bleed within the colon just proximal to the left lower quadrant colostomy.  IR performed successful subselective SMA and MARIE arteriography without evidence of discrete active bleed or pseudoaneurysm. Successful wire dissection at the origin of the sigmoidal artery, accounting for the territory of the bleed.   Suspect diverticular bleed.    10/17: Rebleed into the ostomy, darker red slow ooze/trickle with clots. Hemodynamically stable.  CTA with \"Active GI bleed is noted involving the colon just proximal to the colostomy. This is similar location to the prior study however is not as pronounced.\"  Discussed with both GI and IR. No IR intervention recommended " as the site is close to the ostomy and does not appear to be from an arterial source. GI in agreement.   Hgb decreased to low 7s, s/p 2u pRBCs. Bleeding stopped.  S/p colonoscopy through ostomy 10/18: noted diverticuli and 2 polyps which were small and not removed.     -Hgb stable, checking at 18:00 and then BID.  Transfuse for Hgb <8.  -Continue IV PPI BID  -Restarted CLD, advance as tolerate  -Having soft-low normal BP, holding methenamine, metoprolol, tamsulosin and torsemide  -Continue holding clopidogrel until 10/20 per GI, per Cards should restart        VTE Assessment: Padua    VTE Prophylaxis:  Current anticoagulants:    None      Code Status: DNR (A.N.D.)      Estimated Discharge Date: 10/21/2024   Disposition Planning: pending clinical improvement     Nicolas Lee DO  10/19/2024

## 2024-10-19 NOTE — PLAN OF CARE
Problem: Adult Inpatient Plan of Care  Goal: Plan of Care Review  Outcome: Progressing  Flowsheets (Taken 10/19/2024 0618)  Progress: improving  Outcome Evaluation: Pt AAOx4. Anxiously awaiting diet order. Colostomy intact and empty. Pt using urinal. No BM overnight.  Plan of Care Reviewed With: patient  Goal: Patient-Specific Goal (Individualized)  Outcome: Progressing  Goal: Absence of Hospital-Acquired Illness or Injury  Outcome: Progressing  Goal: Optimal Comfort and Wellbeing  Outcome: Progressing  Goal: Readiness for Transition of Care  Outcome: Progressing   Plan of Care Review  Plan of Care Reviewed With: patient  Progress: improving  Outcome Evaluation: Pt AAOx4. Anxiously awaiting diet order. Colostomy intact and empty. Pt using urinal. No BM overnight.

## 2024-10-19 NOTE — PROGRESS NOTES
Cardiology Daily Progress Note    Subjective   Samy Elena is a 89 y.o. male who was admitted for GI bleeding [K92.2]  Thrombocytopenia (CMS/HCC) [D69.6]  Hemoglobin drop [R71.0]  Hemorrhage from enterostomy stoma (CMS/[facility]) [K94.11]  Anemia, unspecified type [D64.9]  Gastrointestinal hemorrhage, unspecified gastrointestinal hemorrhage type [K92.2].    No acute events overnight.  Patient reports feeling well.  He denies any cardiovascular symptoms.  He denies any shortness of breath or lower extremity edema.    He will underwent colonoscopy yesterday with no active bleeding but with demonstration of diverticulosis and polyps.    He reports no episodes of melena since yesterday.      Current Facility-Administered Medications   Medication Dose Route Frequency Provider Last Rate Last Admin    acetaminophen (TYLENOL) tablet 650 mg  650 mg oral q6h PRN Isai Suh DO        carboxymethylcellulose (REFRESH PLUS) 0.5 % ophthalmic dropperette 1 drop  1 drop Both Eyes 4x daily PRN Isai Suh DO        [Provider Managed Hold] clopidogreL (PLAVIX) tablet 75 mg  75 mg oral q AM Isai Suh DO        cyanocobalamin (VITAMIN B12) tablet 500 mcg  500 mcg oral q AM Isai Suh DO   500 mcg at 10/19/24 0942    escitalopram (LEXAPRO) tablet 5 mg  5 mg oral q AM Isai Suh DO   5 mg at 10/19/24 0942    finasteride (PROSCAR) tablet 5 mg  5 mg oral Daily Isai Suh DO   5 mg at 10/19/24 0942    guaiFENesin (MUCINEX) 12 hr ER tablet 600 mg  600 mg oral BID Isha García CRNP   600 mg at 10/19/24 0942    latanoprost (XALATAN) 0.005 % ophthalmic solution 1 drop  1 drop Left Eye Nightly Isai Suh DO   1 drop at 10/18/24 2311    levothyroxine (SYNTHROID) tablet 100 mcg  100 mcg oral Daily (6:30a) Isai Suh DO   100 mcg at 10/19/24 0608    lidocaine (ASPERCREME) 4 % topical patch 1 patch  1 patch Topical Daily Isai Suh DO   1 patch at 10/19/24 0956    melatonin capsule 5 mg  5 mg oral  Nightly PRN Isai Suh DO        [Provider Managed Hold] methenamine (HIPREX) tablet 1 g  1 g oral Daily Isai Suh DO        [Provider Managed Hold] metoprolol succinate ER (TOPROL-XL) pre-halved 24 hr ER tablet 12.5 mg  12.5 mg oral Daily with dinner Isai Suh DO        pantoprazole (PROTONIX) injection 40 mg  40 mg intravenous q12h IVÁN Isai Suh DO   40 mg at 10/19/24 0951    [Provider Managed Hold] tamsulosin (FLOMAX) 24 hr ER capsule 0.4 mg  0.4 mg oral Daily Isai Suh DO        thiamine (VITAMIN B1) tablet 100 mg  100 mg oral Daily Isai Suh DO   100 mg at 10/19/24 0942    [Provider Managed Hold] torsemide (DEMADEX) tablet 10 mg  10 mg oral Daily Iasi Suh DO           Review of Systems: As above. Otherwise all systems are reviewed and are negative.    Vital signs in last 24 hours:  Temp:  [36.1 °C (97 °F)-36.9 °C (98.4 °F)] 36.4 °C (97.5 °F)  Heart Rate:  [79-91] 88  Resp:  [16-18] 18  BP: ()/(51-67) 114/67      Intake/Output Summary (Last 24 hours) at 10/19/2024 1311  Last data filed at 10/18/2024 1615  Gross per 24 hour   Intake 220 ml   Output 200 ml   Net 20 ml       WEIGHTS:  Weights (last 7 days)       Date/Time Weight    10/15/24 1207 69.5 kg (153 lb 4.8 oz)            PHYSICAL EXAM  Physical Exam  Constitutional:       Comments: Cachectic   HENT:      Head: Normocephalic and atraumatic.      Nose: Nose normal.   Eyes:      Pupils: Pupils are equal, round, and reactive to light.   Neck:      Vascular: Hepatojugular reflux and JVD present.   Cardiovascular:      Rate and Rhythm: Normal rate. Rhythm irregular.      Heart sounds: Murmur (2/6 holosystolic in LLSB) heard.   Pulmonary:      Effort: Pulmonary effort is normal.      Breath sounds: Normal breath sounds. No wheezing or rales.   Abdominal:      General: There is no distension.   Musculoskeletal:      Right lower leg: No edema.      Left lower leg: No edema.   Skin:     General: Skin is warm.    Neurological:      General: No focal deficit present.      Mental Status: He is alert. Mental status is at baseline.         LABS  Results from last 7 days   Lab Units 10/18/24  0603 10/17/24  0350 10/16/24  0520   SODIUM mEQ/L 141 140 142   POTASSIUM mEQ/L 3.7 4.1 4.4   CHLORIDE mEQ/L 111* 110* 112*   CO2 mEQ/L 22 24 23   BUN mg/dL 21 23 25   CREATININE mg/dL 1.4* 1.4* 1.5*   CALCIUM mg/dL 7.8* 8.2* 7.8*   GLUCOSE mg/dL 95 84 98         Results from last 7 days   Lab Units 10/19/24  0816 10/18/24  1837 10/18/24  0603   WBC K/uL 5.85 6.81 5.67   HEMOGLOBIN g/dL 9.1* 9.1* 7.4*   HEMATOCRIT % 28.2* 28.2* 22.9*   PLATELETS K/uL 112* 112* 114*       Lab Results   Component Value Date    TSH 6.26 (H) 07/28/2024       Cardiovascular Studies:     TTE - 9/24/24     The left ventricular cavity size is small with mild left ventricular hypertrophy and mildly reduced systolic function. Estimated EF 40-45% by visual estimate. Abnormal septal motion due to RV volume overload. Unable to assess diastolic function due to technically difficult study. Low stroke volume (32 ml).      The aortic valve is tricuspid. Aortic valve and root sclerosis. Mild aortic regurgitation.      The mitral valve is thickened. Mild mitral annular calcification. Trace regurgitation.      The left atrium is normal in size.      The right ventricular cavity is massively dilated with severely decreased systolic function. Apical hypercontractility.      The pulmonic valve is not well seen. There is trace pulmonic regurgitation.     There is a 13 mm coaptation gap between the leaflets of the tricuspid valve resulting in torrential ( wide -open) tricuspid regurgitation (PISA EROA 2.4cm2, Rvol 96 mL). Tricuspid valve annulus is dilated and measures 6.8 cm in diameter. Unable to assess RVSP in the setting of torrential tricuspid regurgitation.      The right atrium is severely dilated.     The IVC is massively dilated and collapses less than 50% with  inspiration consistent with severely elevated right atrial pressure.      No pericardial effusion. Large pleural effusion.      Compared to previous echocardiogram from 3/28/2024, no significant change.       Telemetry: atrial fibrillation, with ventricular rate of 80  Radiology: Not applicable      Assessment/Plan   No new Assessment & Plan notes have been filed under this hospital service since the last note was generated.  Service: Cardiology    Patient is a 89-year-old male with history of CAD s/p CABG in 1996, ischemic cardiomyopathy heart failure with mildly reduced ejection fraction with EF of 45 to 50%, severe right ventricular dysfunction, severe tricuspid regurgitation, permanent atrial fibrillation not on anticoagulation, recurrent left pleural effusion with history of thoracenteses, and history of GI bleed who presents with melena coming out of his ostomy with acute on chronic anemia in the setting. Patient had a recurrent episode of significant melena yesterday during his hospitalization. CT angiography demonstrated active GI bleed involving the colon just proximal to the colostomy. Cardiology was consulted for preoperative restratification prior to colonoscopy.      # GI bleed  Patient has history of current GI bleed.  He has had history of GI bleed on aspirin therapy.  At that time, he was transition to Plavix.  He is also demonstrated bleeding on clopidogrel therapy.  He underwent colonoscopy yesterday with demonstration of diverticulosis which is likely the culprit.  No active bleeding.  -Patient should ideally be on antiplatelet agent in the setting of history of CAD if able to be resumed from GI's perspective  -Given significant RV dysfunction and torrential TR, patient's long-term cardiac prognosis is poor and therefore antiplatelet therapy realistically may make no meaningful change in long-term outcome    # Severe right ventricular dysfunction  # Torrential TR  Patient examines relatively  euvolemic.  -PLEASE resume home torsemide 10 mg daily  -Patient should follow-up with outpatient cardiologist.  He states he has a appointment scheduled in around 2 weeks.    # Permanent atrial fibrillation  Patient is not on anticoagulation in the setting of recurrent GI bleeds.    # CAD  # History of CABG  History of bleeding on aspirin therapy.  Was most recently on Plavix with bleeding.  -Defer resumption of antiplatelet therapy to GI      Nancy Sahni MD  10/19/2024

## 2024-10-20 LAB
BACTERIA UR CULT: ABNORMAL
BACTERIA UR CULT: ABNORMAL
ERYTHROCYTE [DISTWIDTH] IN BLOOD BY AUTOMATED COUNT: 19.5 % (ref 11.6–14.4)
HCT VFR BLD AUTO: 25.2 % (ref 40.1–51)
HGB BLD-MCNC: 8.4 G/DL (ref 13.7–17.5)
MCH RBC QN AUTO: 31.9 PG (ref 28–33.2)
MCHC RBC AUTO-ENTMCNC: 33.3 G/DL (ref 32.2–36.5)
MCV RBC AUTO: 95.8 FL (ref 83–98)
PLATELET # BLD AUTO: 114 K/UL (ref 150–350)
PMV BLD AUTO: 9.6 FL (ref 9.4–12.4)
RBC # BLD AUTO: 2.63 M/UL (ref 4.5–5.8)
WBC # BLD AUTO: 6.34 K/UL (ref 3.8–10.5)

## 2024-10-20 PROCEDURE — 21400000 HC ROOM AND CARE CCU/INTERMEDIATE

## 2024-10-20 PROCEDURE — 36415 COLL VENOUS BLD VENIPUNCTURE: CPT | Performed by: PHYSICIAN ASSISTANT

## 2024-10-20 PROCEDURE — 85027 COMPLETE CBC AUTOMATED: CPT | Performed by: PHYSICIAN ASSISTANT

## 2024-10-20 PROCEDURE — 63700000 HC SELF-ADMINISTRABLE DRUG: Performed by: INTERNAL MEDICINE

## 2024-10-20 PROCEDURE — 63700000 HC SELF-ADMINISTRABLE DRUG: Performed by: NURSE PRACTITIONER

## 2024-10-20 PROCEDURE — 63600000 HC DRUGS/DETAIL CODE: Mod: JZ | Performed by: INTERNAL MEDICINE

## 2024-10-20 PROCEDURE — 99233 SBSQ HOSP IP/OBS HIGH 50: CPT | Performed by: INTERNAL MEDICINE

## 2024-10-20 RX ORDER — CLOPIDOGREL BISULFATE 75 MG/1
75 TABLET ORAL EVERY MORNING
Status: DISCONTINUED | OUTPATIENT
Start: 2024-10-20 | End: 2024-10-24 | Stop reason: HOSPADM

## 2024-10-20 RX ADMIN — LATANOPROST 1 DROP: 50 SOLUTION/ DROPS OPHTHALMIC at 21:34

## 2024-10-20 RX ADMIN — PANTOPRAZOLE SODIUM 40 MG: 40 INJECTION, POWDER, LYOPHILIZED, FOR SOLUTION INTRAVENOUS at 08:45

## 2024-10-20 RX ADMIN — PANTOPRAZOLE SODIUM 40 MG: 40 INJECTION, POWDER, LYOPHILIZED, FOR SOLUTION INTRAVENOUS at 20:02

## 2024-10-20 RX ADMIN — TORSEMIDE 10 MG: 10 TABLET ORAL at 08:29

## 2024-10-20 RX ADMIN — METOPROLOL SUCCINATE 12.5 MG: 25 TABLET, EXTENDED RELEASE ORAL at 19:15

## 2024-10-20 RX ADMIN — GUAIFENESIN 600 MG: 600 TABLET ORAL at 19:59

## 2024-10-20 RX ADMIN — CLOPIDOGREL 75 MG: 75 TABLET ORAL at 13:15

## 2024-10-20 RX ADMIN — GUAIFENESIN 600 MG: 600 TABLET ORAL at 08:29

## 2024-10-20 RX ADMIN — LIDOCAINE 4% 1 PATCH: 40 PATCH TOPICAL at 08:40

## 2024-10-20 RX ADMIN — Medication 500 MCG: at 08:29

## 2024-10-20 RX ADMIN — THIAMINE HCL TAB 100 MG 100 MG: 100 TAB at 08:29

## 2024-10-20 RX ADMIN — LEVOTHYROXINE SODIUM 100 MCG: 0.1 TABLET ORAL at 05:26

## 2024-10-20 RX ADMIN — FINASTERIDE 5 MG: 5 TABLET, FILM COATED ORAL at 08:29

## 2024-10-20 RX ADMIN — ESCITALOPRAM OXALATE 5 MG: 5 TABLET, FILM COATED ORAL at 08:29

## 2024-10-20 NOTE — PLAN OF CARE
Plan of Care Review  Plan of Care Reviewed With: patient  Progress: improving  Outcome Evaluation: Pt received on aao x 4 and able to make needs known. Continues on EBSL precautions. Afib on cardiac monitor. No distress observed. Tolerated meds crushed in applesauce. Colostomy bag in place. Stoma pink and moist with brown loose stool. Denies pain or discomfort. Safety measures in place and call bell within reach.

## 2024-10-20 NOTE — PLAN OF CARE
Plan of Care Review  Plan of Care Reviewed With: patient  Progress: improving  Outcome Evaluation: Pt. AAOx4, resting comfortably in bed, no c/o pain.  Colostomy output brown/loose stool, good PO intake, pt. declined OOB to chair during shift.  FSP in place.

## 2024-10-20 NOTE — PROGRESS NOTES
Fr. Meza administered Sacrament of the Sick to [facility] patient as well as providing the Eucharist as requested by patient. - Charted by Rev. Trse Dwyer, Spiritual Care Coordinator, [id number] [phone]

## 2024-10-20 NOTE — PROGRESS NOTES
Hospital Medicine     Daily Progress Note       SUBJECTIVE   Interval History: Patient seen. Feeling well. No additional bleeding.      OBJECTIVE      Vital signs in last 24 hours:  Temp:  [36.4 °C (97.5 °F)-36.8 °C (98.2 °F)] 36.5 °C (97.7 °F)  Heart Rate:  [61-99] 76  Resp:  [16-18] 16  BP: (100-115)/(56-67) 115/67    Intake/Output Summary (Last 24 hours) at 10/20/2024 0919  Last data filed at 10/19/2024 2000  Gross per 24 hour   Intake 10 ml   Output --   Net 10 ml       PHYSICAL EXAMINATION      Physical Exam  Vitals and nursing note reviewed.   Constitutional:       General: He is not in acute distress.     Comments: Awake and alert, oriented.  Thin, frail and chronically ill-appearing.  Pleasant demeanor.   HENT:      Head: Normocephalic and atraumatic.      Nose: Nose normal.      Mouth/Throat:      Mouth: Mucous membranes are moist.   Eyes:      Pupils: Pupils are equal, round, and reactive to light.   Cardiovascular:      Rate and Rhythm: Normal rate. Rhythm irregular.      Heart sounds: Murmur heard.      Comments: Grade III/VI murmur  Pulmonary:      Effort: Pulmonary effort is normal. No respiratory distress.      Breath sounds: No wheezing.     .  Abdominal:      General: Bowel sounds are normal. There is no distension.      Palpations: Abdomen is soft.      Tenderness: There is no abdominal tenderness.      Comments: Ostomy pink and patent, slightly edematous less than yesterday.  No bleeding/blood in bag, brown soft to liquid stool in ostomy bag  Musculoskeletal:      Right lower leg: No edema.      Left lower leg: No edema.   Skin:     General: Skin is warm and dry.   Neurological:      Mental Status: He is alert and oriented to person, place, and time.   Psychiatric:         Mood and Affect: Mood normal.         Behavior: Behavior normal.         Thought Content: Thought content normal.      LINES, CATHETERS, DRAINS, AIRWAYS, AND WOUNDS   Lines, Drains, and Airways:  Wounds (agree with  documentation and present on admission):  Peripheral IV (Adult) 10/15/24 Right Antecubital (Active)   Number of days: 5       Peripheral IV (Adult) 10/17/24 Anterior;Left Forearm (Active)   Number of days: 3       Colostomy LLQ (Active)   Number of days: 444         Comments:    LABS / IMAGING / TELE      Labs  I have reviewed the patient's pertinent labs.  Significant abnormals are Hgb 8.4.      Imaging  I have independently reviewed the pertinent imaging from the last 24 hrs.    ECG/Telemetry  I have independently reviewed the telemetry. No events for the last 24 hours.    ASSESSMENT AND PLAN      Recurrent pleural effusion on left  Assessment & Plan  Last thoracentesis in 09/2024.  CT revealing moderate to large left pleural effusion.  Stable on RA.    -Monitor oxygen status closely  -Can consider repeat thoracentesis while admitted once acute issues resolved     Compression fracture of lumbar vertebra (CMS/HCC)  Assessment & Plan  CT with incidental finding of age-indeterminate L1 vertebral body compression fracture, new since July 2024.  Pain is minimal.    -Pain control with lidocaine patch and PRN acetaminophen   -PT/OT rec home    Shortness of breath  Assessment & Plan  Started having shortness of breath today. Denies chest pain.  Well saturated on room air. Hemodynamically stable.  Currently with a GIB, blood oozing from his ostomy with stable anemia for now.  Also has a known moderate-large left pleural effusion and small right pleural effusion.  CXR with stable pleural effusion, new patcy airspace dx in RLL.  No longer feeling short of breath and remains stable on RA, no cough or signs of infection, unlikely to have PNA.  Resolved.      History of recurrent UTIs  Assessment & Plan  Recently started on course of  bactrim and changed to cefuroxime after home urine dip stick positive.  Denies dysuria.  UA abnormal.    -Urine cx growing E. Coli, follow for sensitivities  -Hold on further antibx as is  "asymptomatic    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  Presented for GIB and after large amount of blood was found in his ostomy bag.    -General surgery evaluated upon admission, recommending GIB work up     CKD (chronic kidney disease) stage 4, GFR 15-29 ml/min (CMS/[facility])  Assessment & Plan  Cr of 1.7 upon admission appears at baseline ~1.6-1.7.  Cr below baseline.    -Will monitor Cr closely s/p CTA  -Avoid nephrotoxic agents and renally dose meds    Depression  Assessment & Plan  -Continuing home lexapro 5mg qD     Hypothyroidism  Assessment & Plan  -Continuing home levothyroxine     Coronary artery disease  Assessment & Plan  S/p CABG.    -Holding home Plavix in setting of GI bleed  -Does not appear to be on a statin     Hypertension  Assessment & Plan  BP continue to be low-normal.    -resumed home metop and torsemide per cards    * GI bleeding  Assessment & Plan  Presented with 1 day of bleeding from ostomy; does have history of diverticulosis and prior history of bleeding.  Not on A/C but is on Plavix.  Hemodynamically stable on admission.  CTA revealing acute GI bleed within the colon just proximal to the left lower quadrant colostomy.  IR performed successful subselective SMA and MARIE arteriography without evidence of discrete active bleed or pseudoaneurysm. Successful wire dissection at the origin of the sigmoidal artery, accounting for the territory of the bleed.   Suspect diverticular bleed.    10/17: Rebleed into the ostomy, darker red slow ooze/trickle with clots. Hemodynamically stable.  CTA with \"Active GI bleed is noted involving the colon just proximal to the colostomy. This is similar location to the prior study however is not as pronounced.\"  Discussed with both GI and IR. No IR intervention recommended as the site is close to the ostomy and does not appear to be from an arterial source. GI in agreement.   Hgb decreased to low " 7s, s/p 2u pRBCs. Bleeding stopped.  S/p colonoscopy through ostomy 10/18: noted diverticuli and 2 polyps which were small and not removed.     -hgb trickling down but not overt bleeding  - resume plavix today  - monitor CBC  -Continue IV PPI BID  -regular diet        VTE Assessment: Padua    VTE Prophylaxis:  Current anticoagulants:    None      Code Status: DNR (A.N.D.)      Estimated Discharge Date: 10/21/2024   Disposition Planning: pending clinical improvement     Nicolas Lee DO  10/20/2024

## 2024-10-21 LAB
ANION GAP SERPL CALC-SCNC: 8 MEQ/L (ref 3–15)
BUN SERPL-MCNC: 19 MG/DL (ref 7–25)
CALCIUM SERPL-MCNC: 7.4 MG/DL (ref 8.6–10.3)
CHLORIDE SERPL-SCNC: 106 MEQ/L (ref 98–107)
CO2 SERPL-SCNC: 23 MEQ/L (ref 21–31)
CREAT SERPL-MCNC: 1.4 MG/DL (ref 0.7–1.3)
EGFRCR SERPLBLD CKD-EPI 2021: 46.9 ML/MIN/1.73M*2
ERYTHROCYTE [DISTWIDTH] IN BLOOD BY AUTOMATED COUNT: 19.5 % (ref 11.6–14.4)
ERYTHROCYTE [DISTWIDTH] IN BLOOD BY AUTOMATED COUNT: 19.6 % (ref 11.6–14.4)
GLUCOSE SERPL-MCNC: 114 MG/DL (ref 70–99)
HCT VFR BLD AUTO: 25 % (ref 40.1–51)
HCT VFR BLD AUTO: 25.4 % (ref 40.1–51)
HGB BLD-MCNC: 8 G/DL (ref 13.7–17.5)
HGB BLD-MCNC: 8.3 G/DL (ref 13.7–17.5)
MCH RBC QN AUTO: 31.9 PG (ref 28–33.2)
MCH RBC QN AUTO: 32.3 PG (ref 28–33.2)
MCHC RBC AUTO-ENTMCNC: 32 G/DL (ref 32.2–36.5)
MCHC RBC AUTO-ENTMCNC: 32.7 G/DL (ref 32.2–36.5)
MCV RBC AUTO: 98.8 FL (ref 83–98)
MCV RBC AUTO: 99.6 FL (ref 83–98)
PLATELET # BLD AUTO: 122 K/UL (ref 150–350)
PLATELET # BLD AUTO: 123 K/UL (ref 150–350)
PMV BLD AUTO: 10.1 FL (ref 9.4–12.4)
PMV BLD AUTO: 9.9 FL (ref 9.4–12.4)
POTASSIUM SERPL-SCNC: 3.5 MEQ/L (ref 3.5–5.1)
RBC # BLD AUTO: 2.51 M/UL (ref 4.5–5.8)
RBC # BLD AUTO: 2.57 M/UL (ref 4.5–5.8)
SODIUM SERPL-SCNC: 137 MEQ/L (ref 136–145)
WBC # BLD AUTO: 5.82 K/UL (ref 3.8–10.5)
WBC # BLD AUTO: 6.56 K/UL (ref 3.8–10.5)

## 2024-10-21 PROCEDURE — 63600000 HC DRUGS/DETAIL CODE: Mod: JZ | Performed by: INTERNAL MEDICINE

## 2024-10-21 PROCEDURE — 200200 PR NO CHARGE: Performed by: COLON & RECTAL SURGERY

## 2024-10-21 PROCEDURE — 97530 THERAPEUTIC ACTIVITIES: CPT | Mod: GP,CQ

## 2024-10-21 PROCEDURE — 97535 SELF CARE MNGMENT TRAINING: CPT | Mod: GO

## 2024-10-21 PROCEDURE — 85027 COMPLETE CBC AUTOMATED: CPT | Performed by: INTERNAL MEDICINE

## 2024-10-21 PROCEDURE — 80048 BASIC METABOLIC PNL TOTAL CA: CPT | Performed by: INTERNAL MEDICINE

## 2024-10-21 PROCEDURE — 21400000 HC ROOM AND CARE CCU/INTERMEDIATE

## 2024-10-21 PROCEDURE — 99232 SBSQ HOSP IP/OBS MODERATE 35: CPT | Performed by: COLON & RECTAL SURGERY

## 2024-10-21 PROCEDURE — 99233 SBSQ HOSP IP/OBS HIGH 50: CPT | Performed by: HOSPITALIST

## 2024-10-21 PROCEDURE — 63700000 HC SELF-ADMINISTRABLE DRUG: Performed by: NURSE PRACTITIONER

## 2024-10-21 PROCEDURE — 36415 COLL VENOUS BLD VENIPUNCTURE: CPT | Performed by: INTERNAL MEDICINE

## 2024-10-21 PROCEDURE — 63700000 HC SELF-ADMINISTRABLE DRUG: Performed by: INTERNAL MEDICINE

## 2024-10-21 PROCEDURE — 85027 COMPLETE CBC AUTOMATED: CPT | Performed by: HOSPITALIST

## 2024-10-21 RX ADMIN — LEVOTHYROXINE SODIUM 100 MCG: 0.1 TABLET ORAL at 06:09

## 2024-10-21 RX ADMIN — GUAIFENESIN 600 MG: 600 TABLET ORAL at 08:28

## 2024-10-21 RX ADMIN — CLOPIDOGREL 75 MG: 75 TABLET ORAL at 08:28

## 2024-10-21 RX ADMIN — PANTOPRAZOLE SODIUM 40 MG: 40 INJECTION, POWDER, LYOPHILIZED, FOR SOLUTION INTRAVENOUS at 08:28

## 2024-10-21 RX ADMIN — TORSEMIDE 10 MG: 10 TABLET ORAL at 08:28

## 2024-10-21 RX ADMIN — Medication 500 MCG: at 08:27

## 2024-10-21 RX ADMIN — ESCITALOPRAM OXALATE 5 MG: 5 TABLET, FILM COATED ORAL at 08:28

## 2024-10-21 RX ADMIN — FINASTERIDE 5 MG: 5 TABLET, FILM COATED ORAL at 08:28

## 2024-10-21 RX ADMIN — LIDOCAINE 4% 1 PATCH: 40 PATCH TOPICAL at 08:28

## 2024-10-21 RX ADMIN — THIAMINE HCL TAB 100 MG 100 MG: 100 TAB at 08:28

## 2024-10-21 RX ADMIN — GUAIFENESIN 600 MG: 600 TABLET ORAL at 19:51

## 2024-10-21 ASSESSMENT — COGNITIVE AND FUNCTIONAL STATUS - GENERAL
CLIMB 3 TO 5 STEPS WITH RAILING: 2 - A LOT
WALKING IN HOSPITAL ROOM: 2 - A LOT
WALKING IN HOSPITAL ROOM: 2 - A LOT
HELP NEEDED FOR PERSONAL GROOMING: 3 - A LITTLE
STANDING UP FROM CHAIR USING ARMS: 2 - A LOT
DRESSING REGULAR UPPER BODY CLOTHING: 3 - A LITTLE
HELP NEEDED FOR BATHING: 2 - A LOT
CLIMB 3 TO 5 STEPS WITH RAILING: 2 - A LOT
TOILETING: 2 - A LOT
STANDING UP FROM CHAIR USING ARMS: 2 - A LOT
AFFECT: CONFUSED;WFL
WALKING IN HOSPITAL ROOM: 3 - A LITTLE
MOVING TO AND FROM BED TO CHAIR: 3 - A LITTLE
DRESSING REGULAR LOWER BODY CLOTHING: 3 - A LITTLE
MOVING TO AND FROM BED TO CHAIR: 2 - A LOT
STANDING UP FROM CHAIR USING ARMS: 3 - A LITTLE
MOVING TO AND FROM BED TO CHAIR: 2 - A LOT
AFFECT: CONFUSED;WFL
EATING MEALS: 3 - A LITTLE
CLIMB 3 TO 5 STEPS WITH RAILING: 2 - A LOT

## 2024-10-21 NOTE — PLAN OF CARE
Plan of Care Review  Plan of Care Reviewed With: patient  Progress: improving  Outcome Evaluation: Pt aao x 4. Tolerated meds. Continues on cardiac monitor. Colostomy intact with brown loose stool. Denies pain/discomfort. Safety measures in place and call bell within reach.

## 2024-10-21 NOTE — PROGRESS NOTES
Hospital Medicine     Daily Progress Note       SUBJECTIVE   Interval History: Patient seen. Feeling well. Ostomy hasn't had any further blood, tolerating PO well, no LH, no SOB    Per RN - patient passed blood via rectum - I visualized the pad and there was dark red/black clots present     Spoke with patient's son - discussed need to hold plavix and continue to monitor, if concern for ongoing brisk bleed will discuss with IR though unclear     OBJECTIVE      Vital signs in last 24 hours:  Temp:  [36.4 °C (97.6 °F)-36.7 °C (98 °F)] 36.6 °C (97.9 °F)  Heart Rate:  [69-94] 90  Resp:  [16-18] 16  BP: (101-104)/(55-66) 103/59    Intake/Output Summary (Last 24 hours) at 10/21/2024 1048  Last data filed at 10/20/2024 1930  Gross per 24 hour   Intake --   Output 300 ml   Net -300 ml       PHYSICAL EXAMINATION      Physical Exam  Vitals and nursing note reviewed.   Constitutional:       General: He is not in acute distress.     Comments: chronically ill-appearing.  Pleasant demeanor.   HENT:      Head: Normocephalic and atraumatic.      Nose: Nose normal.      Mouth/Throat:      Mouth: Mucous membranes are moist.   Eyes:      Pupils: Pupils are equal, round, and reactive to light.   Cardiovascular:      Rate and Rhythm: Normal rate. Rhythm irregular.      Heart sounds: Murmur heard.      Comments: Grade III/VI murmur  Pulmonary:      Effort: Pulmonary effort is normal. No respiratory distress.      Breath sounds: No wheezing.      Comments: diminished L lung   Abdominal:      General: Bowel sounds are normal. There is no distension.      Palpations: Abdomen is soft.      Tenderness: There is no abdominal tenderness.      Comments: Ostomy pink and patent, No bleeding/blood in bag, light brown soft to liquid stool in ostomy bag  Musculoskeletal:      Right lower leg: No edema.      Left lower leg: No edema.   Skin:     General: Skin is warm and dry.   Neurological:      Mental Status: He is alert and oriented to person,  "place, and time.   Psychiatric:         Mood and Affect: Mood normal.         Behavior: Behavior normal.         Thought Content: Thought content normal.      LINES, CATHETERS, DRAINS, AIRWAYS, AND WOUNDS   Lines, Drains, and Airways:  Wounds (agree with documentation and present on admission):  Peripheral IV (Adult) 10/15/24 Right Antecubital (Active)   Number of days: 5       Peripheral IV (Adult) 10/17/24 Anterior;Left Forearm (Active)   Number of days: 3       Colostomy LLQ (Active)   Number of days: 444         Comments:    LABS / IMAGING / TELE      Labs  I have reviewed the patient's pertinent labs.  Significant abnormals are Hgb 8.4.      Imaging  I have independently reviewed the pertinent imaging from the last 24 hrs.    ECG/Telemetry  I have independently reviewed the telemetry. No events for the last 24 hours.    ASSESSMENT AND PLAN      * GI bleeding  Assessment & Plan  Presented with 1 day of bleeding from ostomy; does have history of diverticulosis and prior history of bleeding.  Not on A/C but is on Plavix.  Hemodynamically stable on admission.  CTA revealing acute GI bleed within the colon just proximal to the left lower quadrant colostomy.  IR performed successful subselective SMA and MARIE arteriography without evidence of discrete active bleed or pseudoaneurysm. Successful wire dissection at the origin of the sigmoidal artery, accounting for the territory of the bleed.   Suspect diverticular bleed.    10/17: Rebleed into the ostomy, darker red slow ooze/trickle with clots. Hemodynamically stable.  CTA with \"Active GI bleed is noted involving the colon just proximal to the colostomy. This is similar location to the prior study however is not as pronounced.\"  Discussed with both GI and IR. No IR intervention recommended as the site is close to the ostomy and does not appear to be from an arterial source. GI in agreement.   S/p colonoscopy through ostomy 10/18: noted diverticuli and 2 polyps which " were small and not removed.     -Plavix resumed and had dark red blood clots 10/21 AM - plavix now on hold, possible this is old blood from known bleed given prior was in ostomy bag, trend, if ongoing bleed will discuss with IR  -hgb mildly downtrending - this admit he is s/p 2u pRBCs, conitnue to monitor  -regular diet    Coronary artery disease  Assessment & Plan  S/p CABG.    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  Presented for GIB and after large amount of blood was found in his ostomy bag.    -General surgery evaluated upon admission, management as per GI bleed    Recurrent pleural effusion on left  Assessment & Plan  Last thoracentesis in 09/2024.  CT revealing moderate to large left pleural effusion.  Stable on RA.    -Monitor oxygen status closely  -Can consider repeat thoracentesis if having SOB    Compression fracture of lumbar vertebra (CMS/[facility])  Assessment & Plan  CT with incidental finding of age-indeterminate L1 vertebral body compression fracture, new since July 2024.  Pain is minimal.    -Pain control with lidocaine patch and PRN acetaminophen   -PT/OT rec home    Shortness of breath  Assessment & Plan  Denies chest pain.  Well saturated on room air. Hemodynamically stable.  Suspect anemia from GI bleed contributed  Also has a known moderate-large left pleural effusion and small right pleural effusion.  CXR with stable pleural effusion, new patcy airspace dx in RLL.  No longer feeling short of breath and remains stable on RA, no cough or signs of infection, unlikely to have PNA.  Resolved.      History of recurrent UTIs  Assessment & Plan  Recently started on course of  bactrim and changed to cefuroxime after home urine dip stick  positive.  Denies dysuria.  UA abnormal.    -Urine cx growing ESBL E. Coli  -Hold on further antibx as is asymptomatic    CKD (chronic kidney disease) stage 4, GFR 15-29 ml/min (CMS/[facility])  Assessment & Plan  Cr of 1.7 upon admission appears at baseline ~1.6-1.7.  Cr below baseline.    -Will monitor Cr closely s/p CTA  -Avoid nephrotoxic agents and renally dose meds    Depression  Assessment & Plan  -Continuing home lexapro 5mg qD     Hypothyroidism  Assessment & Plan  -Continuing home levothyroxine     Hypertension  Assessment & Plan  BP continue to be low-normal.    -resumed home metop and torsemide per cards        VTE Assessment: Padua    VTE Prophylaxis:  Current anticoagulants:    None      Code Status: DNR (A.N.D.)      Estimated Discharge Date: 10/22/2024   Disposition Planning: pending clinical improvement     Rhoda Herrera MD  10/21/2024

## 2024-10-21 NOTE — PROGRESS NOTES
Physical Therapy -  Daily Treatment/Progress Note     Patient: Samy Elena  Location: [place] [address]  MRN: [id number]  Today's date: 10/21/2024    HISTORY OF PRESENT ILLNESS     Samy is a 89 y.o. 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 Principal problem is GI bleeding.    Past Medical History  Samy has a past medical history of Aneurysm of internal iliac artery (CMS/[facility]), Atrial fibrillation (CMS/[facility]), CHF (congestive heart failure) (CMS/[facility]), 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[facility]), Coronary artery disease, Disease of thyroid gland, Will catheter in place, GI (gastrointestinal bleed), Hypertension, Myocardial infarction (CMS/[facility]), Orthostatic hypotension, and TIA (transient ischemic attack).    History of Present Illness  Presents with BRB from ostomy    s/p IR intervention on 10/10          PRIOR LEVEL OF FUNCTION AND LIVING ENVIRONMENT     Prior Level of Function      Flowsheet Row Most Recent Value   Dominant Hand right   Ambulation assistive equipment and person   Transferring assistive equipment and person   Toileting assistive person   Bathing assistive person   Dressing assistive person   Eating independent   IADLs assistive person   Driving/Transportation friends/family   Communication understands/communicates without difficulty   Prior Level of Function Comment pt has 24/7 HHA assists c ADLs, IALs, transfers, mostly uses w/c for mobility, occasional short distance amb with RW   Assistive Device Currently Used at Home wheelchair, ramps, walker, front-wheeled, walker, 4-wheeled, hospital bed, commode, 3-in-1             Prior Living Environment      Flowsheet Row Most Recent Value   People in Home other (see comments)   Current Living  Arrangements home   Home Accessibility ramp to enter home   Living Environment Comment pt has 24/7 HHA assists c ADLs, IALs, transfers, and amb c RW          VITALS AND PAIN     PT Vitals      Date/Time Pulse HR Source SpO2 Pt Activity O2 Therapy BP MAP BP Location BP Method Pt Position Observations [facility]   10/21/24 0855 78 Monitor 98 % At rest None (Room air) 101/55 70 mmHg Right upper arm Automatic Lying -- [facility]   10/21/24 0858 -- -- -- -- -- 104/57 77 mmHg Right upper arm Automatic Sitting no dizziness [facility]   10/21/24 0904 90 Monitor -- -- -- 103/59 77 mmHg Right upper arm Automatic Sitting after taking steps to chair [facility]          PT Pain      Date/Time Pain Type Rating: Rest Rating: Activity [facility]   10/21/24 0855 Pain Assessment 0 - no pain 0 - no pain [facility]             Objective   OBJECTIVE     Start time:  0854  End time:  0907  Session Length: 13 min  Mode of Treatment: co-treatment  Reason for co-treatment: activity tolerance    General Observations  Patient received reclined, in bed. He was agreeable to therapy, no issues or concerns identified by nurse prior to session. awake, alert, in bed    Precautions: fall (+ostomy)       Limitations/Impairments: hearing, safety/cognitive      PT Eval and Treat - 10/21/24 0854          Cognition    Orientation Status oriented x 3     Affect/Mental Status confused;WFL     Follows Commands WFL;increased processing time needed;verbal cues/prompting required;repetition of directions required     Cognitive Function safety deficit     Safety Deficit minimal deficit;insight into deficits/self-awareness;judgment     Comment, Cognition pleasant, cooperative, receptive to importance of OOB activity/positioning, benefits from cues for sequencing when taking steps to chair        Bed Mobility    Bed Mobility Activities supine to sit     [place] supervision     Safety/Cues increased time to complete     Assistive Device head of bed elevated;bed rails     Comment OOB to R, increased  time to complete, cues for weight shift/scooting to EOB. Denies dizziness, BP stable        Mobility Belt    Mobility Belt Used During Session no - medical contraindication     Reason Mobility Belt Not Used ostomy        Sit/Stand Transfer    Surface edge of bed     [place] minimum assist (75% or more patient effort);2 person assist     Safety/Cues increased time to complete;minimal;verbal cues;hand placement;initiation     Assistive Device walker, front-wheeled     Transfer Comments from EOB. Noted BRB on pad, does not appear to be from ostomy, RN aware. Denies dizziness        Surface-to-Surface Transfers    Transfer Location bed to chair     Transfer Technique stand step     [place] minimum assist (75% or more patient effort);1 person assist     Safety/Cues increased time to complete;moderate;verbal cues;sequencing;proper use of assistive device     Assistive Device walker, front-wheeled     Transfer Comments Robbi  for steps to chair with RW. Denies dizziness, BP stable.        Gait Training    [place], Gait minimum assist (75% or more patient effort);1 person assist     Safety/Cues increased time to complete;verbal cues;moderate;sequencing;proper use of assistive device     Assistive Device walker, front-wheeled     Distance in Feet 2 feet     Pattern step-to     Comment steps to chair on R        Balance    Static Sitting Balance WFL;sitting, edge of bed     Dynamic Sitting Balance mild impairment;sitting, edge of bed     Sit to Stand Dynamic Balance mild impairment;supported     Static Standing Balance mild impairment;supported     Dynamic Standing Balance mild impairment;supported     Balance Interventions occupation based/functional task     Comment, Balance minAx1-2 with RW        Impairments/Safety Issues    Impairments Affecting Function balance;endurance/activity tolerance;strength     Functional Endurance BP stable, denies dizziness     Safety Issues Affecting Function insight into  deficits/self-awareness                                    Education Documentation  Joint Mobility/Strength, taught by Berry Montgomery PTA at 10/21/2024 11:53 AM.  Learner: Patient  Readiness: Acceptance  Method: Explanation  Response: Verbalizes Understanding  Comment: safe mobility, bed mob/transfer/stepping technique, use of RW        Session Outcome  Patient upright, in chair at end of session, chair alarm on, all needs met, call light in reach, personal items in reach. Nursing notified about patient's performance, patient's position, patient's response to therapy/activity, and change in vital signs.    AM-PAC™ - Mobility (Current Function)     Turning form your back to your side while in flat bed without using bedrails 3 - A Little   Moving from lying on your back to sitting on the side of a flat bed without using bedrails 3 - A Little   Moving to and from a bed to a chair 3 - A Little   Standing up from a chair using your arms 3 - A Little   To walk in a hospital room 3 - A Little   Climbing 3-5 steps with a railing 2 - A Lot   AM-PAC™ Mobility Score 17      ASSESSMENT AND PLAN     Progress Summary  Pt performs sup>sit supervision lvl, sit>stand minAx2, steps to chair minAx1 with RW. Noted BRB on pad after standing, does not appear to be from ostomy, RN aware. BP stable with transitions, denies dizziness. Rec remains home with 24/7 assist for mobility/ADLs and HHPT.    Patient/Family Therapy Goals Statement: Watch [employer] win the [place]    PT Plan      Flowsheet Row Most Recent Value   Rehab Potential good, to achieve stated therapy goals at 10/21/2024 0854   Therapy Frequency 4 times/wk at 10/17/2024 1023   Planned Therapy Interventions balance training, bed mobility training, gait training, home exercise program, strengthening, transfer training, ROM (range of motion), postural re-education at 10/17/2024 1023            PT Discharge Recommendations      Flowsheet Row Most Recent Value   PT  Recommended Discharge Disposition home with home health, home with assistance at 10/17/2024 1023   Anticipated Equipment Needs if Discharged Home (PT) none  [owns appropriate DME] at 10/17/2024 1023

## 2024-10-21 NOTE — PROGRESS NOTES
Occupational Therapy -  Daily Treatment/Progress Note     Patient: Samy Elena  Location: [place] [address]  MRN: [id number]  Today's date: 10/21/2024    HISTORY OF PRESENT ILLNESS     Samy is a 89 y.o. 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 Principal problem is GI bleeding.    Past Medical History  Samy has a past medical history of Aneurysm of internal iliac artery (CMS/[facility]), Atrial fibrillation (CMS/[facility]), CHF (congestive heart failure) (CMS/[facility]), 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[facility]), Coronary artery disease, Disease of thyroid gland, Will catheter in place, GI (gastrointestinal bleed), Hypertension, Myocardial infarction (CMS/[facility]), Orthostatic hypotension, and TIA (transient ischemic attack).    History of Present Illness  Presents with BRB from ostomy    s/p IR intervention on 10/10          PRIOR LEVEL OF FUNCTION AND LIVING ENVIRONMENT     Prior Level of Function      Flowsheet Row Most Recent Value   Dominant Hand right   Ambulation assistive equipment and person   Transferring assistive equipment and person   Toileting assistive person   Bathing assistive person   Dressing assistive person   Eating independent   IADLs assistive person   Driving/Transportation friends/family   Communication understands/communicates without difficulty   Prior Level of Function Comment pt has 24/7 HHA assists c ADLs, IALs, transfers, mostly uses w/c for mobility, occasional short distance amb with RW   Assistive Device Currently Used at Home wheelchair, ramps, walker, front-wheeled, walker, 4-wheeled, hospital bed, commode, 3-in-1             Prior Living Environment      Flowsheet Row Most Recent Value   People in Home other (see comments)   Current  Living Arrangements home   Home Accessibility ramp to enter home   Living Environment Comment pt has 24/7 HHA assists c ADLs, IALs, transfers, and amb c RW          Occupational Profile      Flowsheet Row Most Recent Value   Occupational History/Life Experiences retired professor teaching ethics and mgmt, retired president of [employer]   Performance Patterns enjoys watching [place] sports   Environmental Supports and Barriers 24/7 HHA          VITALS AND PAIN     OT Vitals      Date/Time Pulse HR Source SpO2 Pt Activity O2 Therapy BP MAP BP Location BP Method Pt Position Observations [facility]   10/21/24 0855 78 Monitor 98 % At rest None (Room air) 101/55 70 mmHg Right upper arm Automatic Lying -- [facility]   10/21/24 0858 -- -- -- -- -- 104/57 77 mmHg Right upper arm Automatic Sitting no dizziness [facility]   10/21/24 0904 90 Monitor -- -- -- 103/59 77 mmHg Right upper arm Automatic Sitting after taking steps to chair [facility]          OT Pain      Date/Time Pain Type Rating: Rest Rating: Activity [facility]   10/21/24 0855 Pain Assessment 0 - no pain 0 - no pain [facility]             Objective   OBJECTIVE     Start time:  0852  End time:  0907  Session Length: 15 min  Mode of Treatment: co-treatment  Reason for co-treatment: maximize activity tolerance    General Observations  Patient received reclined, in bed. He was agreeable to therapy, no issues or concerns identified by nurse prior to session. awake and alert, motivated and eager to get OOB    Precautions: fall, contact (ESBL, +osotomy)       Limitations/Impairments: hearing, safety/cognitive   Services  Do You Speak a Language Other Than English at Home?: no  Is an  Needed/Used?: No      OT Eval and Treat - 10/21/24 0852          Cognition    Orientation Status oriented x 3     Affect/Mental Status confused;WFL     Follows Commands WFL;increased processing time needed;verbal cues/prompting required;repetition of directions required     Cognitive Function safety  deficit     Safety Deficit minimal deficit;insight into deficits/self-awareness;judgment     Comment, Cognition pleasant, cooperative, receptive to importance of OOB activity/positioning, benefits from cues for sequencing when taking steps to chair, motivated and eager to work with therapy     Cognitive Interventions/Strategies occupation/activity based interventions        Bed Mobility    Bed Mobility Activities supine to sit     [place] supervision     Safety/Cues increased time to complete;minimal;verbal cues;initiation     Assistive Device head of bed elevated;bed rails     Comment OOB toward Rt side, min cueing for initiation, pt able to complete with SUP and incr. time using bed rails for support. Pt denies dizziness seated EOB, VSS        Mobility Belt    Mobility Belt Used During Session no - medical contraindication     Reason Mobility Belt Not Used ostomy        Sit/Stand Transfer    Surface edge of bed     [place] minimum assist (75% or more patient effort);2 person assist     Safety/Cues increased time to complete;minimal;verbal cues;technique;sequencing;hand placement     Assistive Device walker, front-wheeled     Transfer Comments minAx2, incr. time and effort to achieve full upright, cueing for hand placement and safe transfer technique. Pt steady upon standing with RW and Robbi. BRB noted on damaris pad upon standing, does not seem to be coming from ostomy, RN aware. Pt denies dizziness in standing        Surface-to-Surface Transfers    Transfer Location bed to chair     Transfer Technique stand step     [place] minimum assist (75% or more patient effort);1 person assist     Safety/Cues increased time to complete;minimal;verbal cues;technique;proper use of assistive device;sequencing     Assistive Device walker, front-wheeled     Transfer Comments Robbi with RW, cueing for sequencing and RW mgmt, mild instability noted throughout, no overt LOB. Pt cont. to deny dizziness, VSS in chair         Upper Body Dressing    Tasks don;doff;hospital gown;pajama/robe     [place] minimum assist (75% or more patient effort);1 person assist     Safety/Cues increased time to complete;minimal;verbal cues     Position edge of bed sitting;supported sitting     Adaptive Equipment none     Comment pt donning robe around backside seated EOB, cueing and assist for set up/initiation, pt able to thread BUE through sleeves Robbi to adjust around backisde. pt doffing and donning front gown seated in recliner chair, Robbi for set up and initiation, pt able to complete with incr. time and effort        Lower Body Dressing    Tasks don;socks     [place] close supervision     Safety/Cues increased time to complete;minimal;verbal cues     Position edge of bed sitting     Adaptive Equipment none     Comment pt able to bring LE up toward body to pull up/adjust B socks seated at EOB, incr. time and effort        Grooming    Tasks washes, rinses and dries face     [place] set up     Position sitting up in bed     Setup Assistance obtain supplies     Comment set up assist for grooming tasks in bed, pt using basin to clear up mucous        Toileting    Comment +osotomy        Balance    Static Sitting Balance WFL;sitting, edge of bed     Dynamic Sitting Balance mild impairment;sitting, edge of bed     Sit to Stand Dynamic Balance mild impairment;supported     Static Standing Balance mild impairment;supported     Dynamic Standing Balance mild impairment;supported     Balance Interventions occupation based/functional task     Comment, Balance minAx1-2 with RW OOB        Impairments/Safety Issues    Impairments Affecting Function balance;endurance/activity tolerance;strength     Functional Endurance improving, VSS throughout session, no dizziness reported     Safety Issues Affecting Function insight into deficits/self-awareness                                    Education Documentation  Self-Care, taught by Porsha Rodriguez,  OT at 10/21/2024  2:29 PM.  Learner: Patient  Readiness: Acceptance  Method: Explanation  Response: Verbalizes Understanding, Needs Reinforcement  Comment: goals, safety with mobility and ADls, fall prevention, energy conservation        Session Outcome  Patient upright, in chair at end of session, chair alarm on, all needs met, personal items in reach, call light in reach. Nursing notified about change in vital signs, patient's position, patient's response to therapy/activity, and patient's performance.    AM-PAC™ - ADL (Current Function)     Putting on/taking off regular lower body clothing 3 - A Little   Bathing 2 - A Lot   Toileting 2 - A Lot   Putting on/taking off regular upper body clothing 3 - A Little   Help for taking care of personal grooming 3 - A Little   Eating meals 3 - A Little   AM-PAC™ ADL Score 16      ASSESSMENT AND PLAN     Progress Summary  OT session completed. Pt progressing toward OT goals but cont. to require SUP for bed mobility, minAx1-2 for STS and transfers with RW, incr. time and cueing throughout. Pt CS-Robbi for dressing and grooming tasks with set up assist and cueing, limited by decr. activity tolerance, strength, balance, and mild cog deficits. Cont. to rec. home with 24/7 HHA and HHOT at d/c    Patient/Family Therapy Goal Statement: get stronger, go home    OT Plan      Flowsheet Row Most Recent Value   Rehab Potential good, to achieve stated therapy goals at 10/17/2024 1024   Therapy Frequency 4 times/wk at 10/17/2024 1024   Planned Therapy Interventions activity tolerance training, BADL retraining, patient/caregiver education/training, ROM/therapeutic exercise, transfer/mobility retraining, strengthening exercise, occupation/activity based interventions, functional balance retraining at 10/21/2024 0852            OT Discharge Recommendations      Flowsheet Row Most Recent Value   OT Recommended Discharge Disposition home with assistance, home with home health at 10/17/2024 1024    Anticipated Equipment Needs if Discharged Home (OT) none  [pt has all recommended equipment] at 10/17/2024 1024                 OT Goals      Flowsheet Row Most Recent Value   Bed Mobility Goal 1    Activity/Assistive Device bed mobility activities, all at 10/17/2024 1024   [place] modified independence at 10/17/2024 1024   Time Frame by discharge at 10/17/2024 1024   Progress/Outcome goal ongoing at 10/17/2024 1024   Transfer Goal 1    Activity/Assistive Device all transfers at 10/17/2024 1024   [place] supervision required at 10/17/2024 1024   Time Frame by discharge at 10/17/2024 1024   Progress/Outcome goal ongoing at 10/17/2024 1024   Dressing Goal 1    Activity/Adaptive Equipment dressing skills, all at 10/17/2024 1024   [place] minimum assist (75% or more patient effort) at 10/17/2024 1024   Time Frame by discharge at 10/17/2024 1024   Progress/Outcome goal ongoing at 10/17/2024 1024   Toileting Goal 1    Activity/Assistive Device toileting skills, all at 10/17/2024 1024   [place] minimum assist (75% or more patient effort) at 10/17/2024 1024   Time Frame by discharge at 10/17/2024 1024   Progress/Outcome goal ongoing at 10/17/2024 1024

## 2024-10-21 NOTE — PLAN OF CARE
Care Coordination Discharge Plan Note     Discharge Needs Assessment  Concerns to be Addressed: denies needs/concerns at this time  Current Discharge Risk: chronically ill, dependent with mobility/activities of daily living    Anticipated Discharge Plan  Anticipated Discharge Disposition: home with home health  Type of Home Care Services: home health aide, home OT, home PT, nursing      Patient Choice  Offered/Gave Vendor List: yes  Patient's Choice of Community Agency(s): [facility]    Patient and/or patient guardian/advocate was made aware of their right to choose a provider. A list of eligible providers was presented and reviewed with the patient and/or patient guardian/advocate in written and/or verbal form. The list delineates providers in the patient’s desired geographic area who are participating in the Medicare program and/or providers contracted with the patient’s primary insurance. The Medicare list and quality ratings were obtained from the Medicare.gov [medicare.gov] website.    ---------------------------------------------------------------------------------------------------------------------    Interdisciplinary Discharge Plan Review:  Participants:, physical therapy, advanced practice provider, physician, nursing, social work/services, occupational therapy    Concerns Comments: Patient discussed in CPR. Still with GIB. Potentially stable for home with [facility] and HHA mid-week.    Discharge Plan:   Disposition/Destination: Home Health Care - [facility] / Home  Discharge Facility:    Community Resources:      Discharge Transportation:  Is Out of Hospital DNR needed at Discharge:    Does patient need discharge transport? No

## 2024-10-21 NOTE — PLAN OF CARE
Plan of Care Review  Plan of Care Reviewed With: patient  Progress: improving  Outcome Evaluation: patient is alert, oriented, out of bed with assistx2, patient had couple episodes of rectal bleed, md aware, hemoglobin stable, colostomy intact with loose stool, fall safety measures in place, call bell with in reach.

## 2024-10-21 NOTE — PROGRESS NOTES
[facility] received referral. Patient on service with [facility] PTA. Spoke with patient's caregiver. Referred patient to [facility] for RN and PT with anticipated discharge within the next 48 hours. Will continue to follow. Thank you

## 2024-10-21 NOTE — PROGRESS NOTES
[facility] (General Surgery/MIS/Breast/[facility]) Pager: [phone]     Progress Note    Subjective     89 y.o. 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  VSS afebrile.  Patient had no blood in his ostomy output overnight.  Has been tolerating regular diet without nausea vomiting or bloating.  Overall he says that he feels well.      Objective     Vital signs in last 24 hours:  Temp:  [36.4 °C (97.6 °F)-36.7 °C (98 °F)] 36.7 °C (98 °F)  Heart Rate:  [69-94] 81  Resp:  [16-18] 16  BP: (101-104)/(56-61) 104/56      Intake/Output Summary (Last 24 hours) at 10/21/2024 0823  Last data filed at 10/20/2024 1930  Gross per 24 hour   Intake --   Output 300 ml   Net -300 ml     Intake/Output this shift:  No intake/output data recorded.    Physical Exam    General: Well appearing no acute distress  HEENT: Normocephalic, trachea midline, JVD  CV: /66, RRR HR 98  Pulm: Unlabored respirations, on room air 98%  Abd: Soft, nontender nondistended, ostomy pink protruding, no evidence of old blood or active blood in the ostomy bag  Neuro: Intact, responds to questions appropriately      Labs  CBC Results         10/18/24 10/18/24 10/17/24     0603 0033 1659    WBC 5.67 6.14 5.23    RBC 2.33 2.50 2.26    HGB 7.4 8.0 7.4    HCT 22.9 25.0 22.9    MCV 98.3 100.0 101.3    MCH 31.8 32.0 32.7    MCHC 32.3 32.0 32.3     123 115          CMP Results         10/18/24 10/17/24 10/16/24     0603 0350 0520     140 142    K 3.7 4.1 4.4    Cl 111 110 112    CO2 22 24 23    Glucose 95 84 98    BUN 21 23 25    Creatinine 1.4 1.4 1.5    Calcium 7.8 8.2 7.8    Anion Gap 8 6 7    EGFR 46.9 46.9 43.1           Comment for EGFR at 0603 on 10/18/24: Calculation based on the  Chronic Kidney Disease Epidemiology Collaboration (CKD-EPI) equation refit without adjustment for race.    Comment for EGFR at 0350 on 10/17/24: Calculation based on the Chronic Kidney Disease Epidemiology Collaboration (CKD-EPI) equation refit without adjustment for race.    Comment for EGFR at 0520 on 10/16/24: Calculation based on the Chronic Kidney Disease Epidemiology Collaboration (CKD-EPI) equation refit without adjustment for race.            Assessment/Plan     89 y.o. 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  Patient appears to be improved, no longer bleeding GI scope completed Friday was notable only for diverticulosis which is likely source of bleeding.     Plan:  - F/U Hb  - From a GI bleed perspective would recommend holding Plavix  - Ultimately risk-benefit discussion between benefit of Plavix for coronary and neuroprotective effects must be weighed with risk of bleed  - Further care per primary         Leo Patton MD  House Staff - General Surgery  [facility]  Pager: [phone] [facility]

## 2024-10-21 NOTE — PROGRESS NOTES
General Surgery Significant Event Note    General Surgery was contacted by the primary team for this patient after having blood per rectum.  Primary team they performed a GRACIA as noted dark red blood just.    On review of previous clinic visits and operative notes patient has known external and internal hemorrhoids. Additionally patient had an area of granulation tissue that was cauterized at office visit with Dr. Bryant in February    On my evaluation bedside patient currently asymptomatic vital signs stable.  Hemoglobin 8.0 this morning from 8.4 yesterday, stable. He reports that there have been no further episodes of blood in his ostomy bag.  They were going to change him today they noted the bleeding per rectum, images in the media tab.  Digital rectal exam was completed with nurse chaperone present.  He was noted to have coagulated blood, external hemorrhoids, no internal hemorrhoids palpated, normal prostate. 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  This could also be due to internal hemorrhoids though they were not noted on current digital rectal exam.     Plan:  - Recommend stopping Plavix in setting of further bleeding  - Recommend risk-benefit discussion regarding the utility of Plavix with continued episodes GI bleeding and history of bleeds on anticoagulation and antiplatelet medications  - Further care per primary team,     These recommendations were communicated with the primary team first contact via secure chat at 12:41 PM    Leo Patton MD  House Staff - General Surgery  [facility]  Pager: [phone] [facility]

## 2024-10-22 LAB
ABO + RH BLD: NORMAL
ANION GAP SERPL CALC-SCNC: 8 MEQ/L (ref 3–15)
BLD GP AB SCN SERPL QL: NEGATIVE
BUN SERPL-MCNC: 22 MG/DL (ref 7–25)
CALCIUM SERPL-MCNC: 7.4 MG/DL (ref 8.6–10.3)
CHLORIDE SERPL-SCNC: 106 MEQ/L (ref 98–107)
CO2 SERPL-SCNC: 22 MEQ/L (ref 21–31)
CREAT SERPL-MCNC: 1.4 MG/DL (ref 0.7–1.3)
D AG BLD QL: POSITIVE
EGFRCR SERPLBLD CKD-EPI 2021: 46.9 ML/MIN/1.73M*2
ERYTHROCYTE [DISTWIDTH] IN BLOOD BY AUTOMATED COUNT: 19.3 % (ref 11.6–14.4)
ERYTHROCYTE [DISTWIDTH] IN BLOOD BY AUTOMATED COUNT: 19.4 % (ref 11.6–14.4)
GLUCOSE SERPL-MCNC: 93 MG/DL (ref 70–99)
HCT VFR BLD AUTO: 18 % (ref 40.1–51)
HCT VFR BLD AUTO: 23.7 % (ref 40.1–51)
HGB BLD-MCNC: 5.8 G/DL (ref 13.7–17.5)
HGB BLD-MCNC: 7.9 G/DL (ref 13.7–17.5)
LABORATORY COMMENT REPORT: NORMAL
MCH RBC QN AUTO: 32 PG (ref 28–33.2)
MCH RBC QN AUTO: 32 PG (ref 28–33.2)
MCHC RBC AUTO-ENTMCNC: 32.2 G/DL (ref 32.2–36.5)
MCHC RBC AUTO-ENTMCNC: 33.3 G/DL (ref 32.2–36.5)
MCV RBC AUTO: 96 FL (ref 83–98)
MCV RBC AUTO: 99.4 FL (ref 83–98)
PLATELET # BLD AUTO: 107 K/UL (ref 150–350)
PLATELET # BLD AUTO: 87 K/UL (ref 150–350)
PMV BLD AUTO: 9.8 FL (ref 9.4–12.4)
PMV BLD AUTO: 9.9 FL (ref 9.4–12.4)
POTASSIUM SERPL-SCNC: 3.5 MEQ/L (ref 3.5–5.1)
RBC # BLD AUTO: 1.81 M/UL (ref 4.5–5.8)
RBC # BLD AUTO: 2.47 M/UL (ref 4.5–5.8)
SODIUM SERPL-SCNC: 136 MEQ/L (ref 136–145)
SPECIMEN EXP DATE BLD: NORMAL
WBC # BLD AUTO: 3.98 K/UL (ref 3.8–10.5)
WBC # BLD AUTO: 5.91 K/UL (ref 3.8–10.5)

## 2024-10-22 PROCEDURE — 80048 BASIC METABOLIC PNL TOTAL CA: CPT | Performed by: INTERNAL MEDICINE

## 2024-10-22 PROCEDURE — P9040 RBC LEUKOREDUCED IRRADIATED: HCPCS

## 2024-10-22 PROCEDURE — 85027 COMPLETE CBC AUTOMATED: CPT | Performed by: INTERNAL MEDICINE

## 2024-10-22 PROCEDURE — 85027 COMPLETE CBC AUTOMATED: CPT | Performed by: HOSPITALIST

## 2024-10-22 PROCEDURE — 36430 TRANSFUSION BLD/BLD COMPNT: CPT

## 2024-10-22 PROCEDURE — 99233 SBSQ HOSP IP/OBS HIGH 50: CPT | Performed by: HOSPITALIST

## 2024-10-22 PROCEDURE — 63700000 HC SELF-ADMINISTRABLE DRUG: Performed by: INTERNAL MEDICINE

## 2024-10-22 PROCEDURE — 63600000 HC DRUGS/DETAIL CODE: Mod: JZ | Performed by: HOSPITALIST

## 2024-10-22 PROCEDURE — 21400000 HC ROOM AND CARE CCU/INTERMEDIATE

## 2024-10-22 PROCEDURE — 99232 SBSQ HOSP IP/OBS MODERATE 35: CPT | Performed by: COLON & RECTAL SURGERY

## 2024-10-22 PROCEDURE — 36415 COLL VENOUS BLD VENIPUNCTURE: CPT | Performed by: INTERNAL MEDICINE

## 2024-10-22 PROCEDURE — 63700000 HC SELF-ADMINISTRABLE DRUG: Performed by: NURSE PRACTITIONER

## 2024-10-22 PROCEDURE — 25800000 HC PHARMACY IV SOLUTIONS: Performed by: HOSPITALIST

## 2024-10-22 PROCEDURE — 86850 RBC ANTIBODY SCREEN: CPT

## 2024-10-22 RX ORDER — FERROUS SULFATE 300 MG/5ML
300 LIQUID (ML) ORAL DAILY
Qty: 150 ML | Refills: 0 | Status: SHIPPED | OUTPATIENT
Start: 2024-10-22 | End: 2024-11-27 | Stop reason: DRUGHIGH

## 2024-10-22 RX ORDER — ASPIRIN 81 MG/1
81 TABLET ORAL DAILY
Qty: 30 TABLET | Refills: 0 | Status: SHIPPED | OUTPATIENT
Start: 2024-10-24 | End: 2024-11-27 | Stop reason: SDUPTHER

## 2024-10-22 RX ORDER — SODIUM CHLORIDE 9 MG/ML
5 INJECTION, SOLUTION INTRAVENOUS AS NEEDED
Status: ACTIVE | OUTPATIENT
Start: 2024-10-22 | End: 2024-10-23

## 2024-10-22 RX ADMIN — ESCITALOPRAM OXALATE 5 MG: 5 TABLET, FILM COATED ORAL at 09:05

## 2024-10-22 RX ADMIN — METOPROLOL SUCCINATE 12.5 MG: 25 TABLET, EXTENDED RELEASE ORAL at 17:13

## 2024-10-22 RX ADMIN — FINASTERIDE 5 MG: 5 TABLET, FILM COATED ORAL at 09:06

## 2024-10-22 RX ADMIN — SODIUM CHLORIDE 125 MG: 9 INJECTION, SOLUTION INTRAVENOUS at 11:43

## 2024-10-22 RX ADMIN — LIDOCAINE 4% 1 PATCH: 40 PATCH TOPICAL at 09:06

## 2024-10-22 RX ADMIN — Medication 500 MCG: at 09:05

## 2024-10-22 RX ADMIN — GUAIFENESIN 600 MG: 600 TABLET ORAL at 09:06

## 2024-10-22 RX ADMIN — TORSEMIDE 10 MG: 10 TABLET ORAL at 09:06

## 2024-10-22 RX ADMIN — LATANOPROST 1 DROP: 50 SOLUTION/ DROPS OPHTHALMIC at 22:17

## 2024-10-22 RX ADMIN — GUAIFENESIN 600 MG: 600 TABLET ORAL at 21:00

## 2024-10-22 RX ADMIN — THIAMINE HCL TAB 100 MG 100 MG: 100 TAB at 09:06

## 2024-10-22 RX ADMIN — LEVOTHYROXINE SODIUM 100 MCG: 0.1 TABLET ORAL at 06:38

## 2024-10-22 ASSESSMENT — COGNITIVE AND FUNCTIONAL STATUS - GENERAL
CLIMB 3 TO 5 STEPS WITH RAILING: 2 - A LOT
STANDING UP FROM CHAIR USING ARMS: 2 - A LOT
MOVING TO AND FROM BED TO CHAIR: 2 - A LOT
WALKING IN HOSPITAL ROOM: 2 - A LOT

## 2024-10-22 NOTE — DISCHARGE INSTRUCTIONS
Dear Mr Elena    You were hospitalized at [facility] for bleeding in your ostomy site.  You underwent CT scans, embolization with the interventional radiologist, and a colonoscopy.  You were bleeding from a diverticula in your colon, these are small out pouchings of the colon that occur with age and are prone to bleeding. The bleeding has stopped though it may happen again in the future. Some people may have repeat bleeding within days or months while others have it years later or never have it again in their life time.  You also had bleeding from your rectum which may be related to hemorrhoids. You were seen by both Dr Bryant and the GI team.      After discussion with Dr Bryant and his team - your plavix has been stopped.  You are on plavix due to your history of coronary artery disease. Stopping this medication may increase your risk of heart attack or stroke though it will reduce your risk of significant bleeding. A large bleed can result in heart attack, stroke, shock, and death.  If you do not have any bleeding for the next week, start taking aspirin 81mg daily as this similar though not as strong as plavix.  Please follow up with your cardiologist in the next 1-2 weeks to discuss this change    You should take iron (ferrous sulfate) once a day to help your anemia as you likely lose small amounts of blood chronically from your hemorrhoids.  Please take iron with food but not within 1-2 hours of dairy as it can impair iron absorption. Please tell your doctor if you notice it is causing constipation.    After discharge please call to schedule follow up with:  Your primary care doctor within 1-2 weeks   Cardiology as above.     Contact a health care provider if:  You notice small amounts of blood in your ostomy or from your rectum  You feel your heart racing  You feel weak or dizzy.    Get help right away if:  Your bleeding increases or you have black stool (blood mixed with stomach acid turns  black)  You have severe weakness or you faint.  You vomit blood or black material  You pass large blood clots in your stool.  You have chest pain  Any of your other symptoms get worse.    Thank you and take care!

## 2024-10-22 NOTE — DISCHARGE SUMMARY
"   Hospital Medicine    Inpatient Discharge Summary        BRIEF OVERVIEW   Patient: Samy Elena (10/6/1930)    Admitting Provider: Nicolas Lee DO  Attending Provider: Rhoda Herrera MD Attending phys phone: [phone]    PCP: Zachery Hernandez [phone]    Admission Date: 10/15/2024  Discharge Date: 10/22/2024     DISCHARGE DIAGNOSES      Primary Discharge Diagnosis  GI bleeding    Secondary Discharge Diagnoses  Active Hospital Problems    Diagnosis Date Noted    GI bleeding 10/15/2024     Priority: High    Coronary artery disease 08/03/2023     Priority: Medium    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[facility])      Priority: Low    Gastrointestinal hemorrhage, unspecified gastrointestinal hemorrhage type 10/17/2024    Compression fracture of lumbar vertebra (CMS/[facility]) 10/15/2024    Recurrent pleural effusion on left 10/15/2024    Shortness of breath 07/28/2024    History of recurrent UTIs 07/02/2024    CKD (chronic kidney disease) stage 4, GFR 15-29 ml/min (CMS/[facility]) 12/03/2023    Depression 11/29/2023    Hypothyroidism 11/29/2023    Hypertension       Resolved Hospital Problems   No resolved problems to display.          Problem List on Day of Discharge  * GI bleeding  Assessment & Plan  Presented with 1 day of bleeding from ostomy; does have history of diverticulosis and prior history of bleeding.  Not on A/C but is on Plavix.  Hemodynamically stable on admission.  CTA revealing acute GI bleed within the colon just proximal to the left lower quadrant colostomy.  IR performed successful subselective SMA and MARIE arteriography without evidence of discrete active bleed or pseudoaneurysm. Successful wire dissection at the origin of the sigmoidal artery, accounting for the territory of the bleed.   Suspect diverticular bleed.    10/17: Rebleed into the ostomy, darker red slow ooze/trickle with clots. Hemodynamically stable.  CTA with \"Active GI bleed is noted involving the colon just proximal to the colostomy. This " "is similar location to the prior study however is not as pronounced.\"  Discussed with both GI and IR. No IR intervention recommended as the site is close to the ostomy and does not appear to be from an arterial source. GI in agreement.   S/p colonoscopy through ostomy 10/18: noted diverticuli and 2 polyps which were small and not removed.     -Plavix resumed and had dark red blood clots per rectum 10/21 AM - plavix now on hold, on review he has a rectal stump so this is a second bleed, possibly hemorrhoidal  -hgb overall stable - this admit he is s/p 2u pRBCs, conitnue to hold plavix on dc, can trial aspirin 81mg if no further bleeding, discussed with patient that he will need to follow up with his cardiologist to discuss further  -ordered IV iron, advise oral iron on discharge    Coronary artery disease  Assessment & Plan  S/p CABG.    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  Presented for GIB and after large amount of blood was found in his ostomy bag.    -General surgery evaluated upon admission, management as per GI bleed problem    Recurrent pleural effusion on left  Assessment & Plan  Last thoracentesis in 09/2024.  CT revealing moderate to large left pleural effusion.  Stable on RA.    -Monitor oxygen status closely  -Can consider repeat thoracentesis if having SOB    Compression fracture of lumbar vertebra (CMS/[facility])  Assessment & Plan  CT with incidental finding of age-indeterminate L1 vertebral body compression fracture, new since July 2024.  Pain is minimal.    -Pain control with lidocaine patch and PRN acetaminophen   -PT/OT rec home    Shortness of " breath  Assessment & Plan  Denies chest pain.  Well saturated on room air. Hemodynamically stable.  Suspect anemia from GI bleed contributed  Also has a known moderate-large left pleural effusion and small right pleural effusion.  CXR with stable pleural effusion, new patcy airspace dx in R[facility].  No longer feeling short of breath and remains stable on RA, no cough or signs of infection, unlikely to have PNA.  Resolved.      History of recurrent UTIs  Assessment & Plan  Recently started on course of  bactrim and changed to cefuroxime after home urine dip stick positive.  Denies dysuria.  UA abnormal.    -Urine cx growing ESBL E. Coli  -Hold on further antibx as is asymptomatic    CKD (chronic kidney disease) stage 4, GFR 15-29 ml/min (CMS/[facility])  Assessment & Plan  Cr of 1.7 upon admission appears at baseline ~1.6-1.7.  Cr below baseline.    -Will monitor Cr closely s/p CTA  -Avoid nephrotoxic agents and renally dose meds    Depression  Assessment & Plan  -Continuing home lexapro 5mg qD     Hypothyroidism  Assessment & Plan  -Continuing home levothyroxine     Hypertension  Assessment & Plan  BP continue to be low-normal.    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 with a chief complaint of blood coming out of his ostomy     Hospital Course  89 y.o. M with colostomy (Dr. Bryant) for diversion s/p I&D for Fornier's gangrene (2022) admit with GI bleed into ostomy. CTA revealing acute GI bleed within the colon just proximal to the left lower quadrant colostomy, suspected diverticular. IR performed successful  subselective SMA and MARIE arteriography without evidence of discrete active bleed or pseudoaneurysm. Successful wire dissection at the origin of the sigmoidal artery, accounting for the territory of the bleed. He did rebleed though less significant. GI did scope through the ostomy and noted diverticuli as well as 2 polyps which were small and not removed. He got 2 units PRBCs and hemoglobin stabilized. His plavix was resumed with downtrending hemoblobin and then rectal bleed. This was thought possibly to be hemorrhoidal as he has an end colostomy and rectal stump.  Surgery recommended holding plavix.  Hemoglobin was stable and rectal bleeding resolved. He was advised to trial asa 81mg if no further bleeding in one week and to follow up with his cardiologist. This was also discussed with patient's son.    Exam on Day of Discharge  Physical Exam  Vitals and nursing note reviewed.   Constitutional:       General: He is not in acute distress.     Comments: chronically ill-appearing.  Pleasant demeanor.   HENT:      Head: Normocephalic and atraumatic.      Nose: Nose normal.      Mouth/Throat:      Mouth: Mucous membranes are moist.   Eyes:      Pupils: Pupils are equal, round, and reactive to light.   Cardiovascular:      Rate and Rhythm: Normal rate. Rhythm irregular.      Heart sounds: Murmur heard.      Comments: Grade III/VI murmur  Pulmonary:      Effort: Pulmonary effort is normal. No respiratory distress.      Breath sounds: No wheezing.      Comments: diminished L lung   Abdominal:      General: Bowel sounds are normal. There is no distension.      Palpations: Abdomen is soft.      Tenderness: There is no abdominal tenderness.      Comments: Ostomy pink and patent, No bleeding/blood in bag, light brown soft to liquid stool in ostomy bag  Musculoskeletal:      Right lower leg: No edema.      Left lower leg: No edema.   Skin:     General: Skin is warm and dry.   Neurological:      Mental Status: He is alert and  oriented to person, place, and time.   Psychiatric:         Mood and Affect: Mood normal.         Behavior: Behavior normal.         Thought Content: Thought content normal.     Consults During Admission  IP CONSULT TO GASTROENTEROLOGY  IP CONSULT TO CARDIOLOGY    DISCHARGE MEDICATIONS               Medication List        START taking these medications      aspirin 81 mg enteric coated tablet  Start taking on: October 24, 2024  Take 1 tablet (81 mg total) by mouth daily.  Dose: 81 mg     ferrous sulfate 300 mg (60 mg iron)/5 mL liquid  Take 5 mL (300 mg total) by mouth daily.  Dose: 300 mg            CONTINUE taking these medications      carboxymethylcellulose 0.5 % dropperette  Commonly known as: REFRESH PLUS  Administer 1 drop into both eyes 4 (four) times a day as needed for dry eyes.  Dose: 1 drop     cyanocobalamin 500 mcg tablet  Commonly known as: VITAMIN B12  Take 500 mcg by mouth every morning.  Dose: 500 mcg     escitalopram 5 mg tablet  Commonly known as: LEXAPRO  Take 5 mg by mouth every morning.  Dose: 5 mg     famotidine 10 mg tablet  Commonly known as: PEPCID  Take 1 tablet (10 mg total) by mouth daily Indications: gastroesophageal reflux disease.  Dose: 10 mg     finasteride 5 mg tablet  Commonly known as: PROSCAR  Take 1 tablet (5 mg total) by mouth daily.  Dose: 5 mg     levothyroxine 100 mcg tablet  Commonly known as: SYNTHROID  Take 100 mcg by mouth daily.  Dose: 100 mcg     LUMIGAN 0.01 % ophthalmic drops  Administer 1 drop into the left eye nightly.  Dose: 1 drop  Generic drug: bimatoprost     methenamine 1 gram tablet  Commonly known as: HIPREX  Take 1 g by mouth daily before lunch.  Dose: 1 g     metoprolol succinate XL 25 mg 24 hr tablet  Commonly known as: TOPROL-XL  Take 12.5 mg by mouth daily with dinner.  Dose: 12.5 mg     tamsulosin 0.4 mg capsule  Commonly known as: FLOMAX  Take 1 capsule (0.4 mg total) by mouth daily.  Dose: 0.4 mg     thiamine 50 mg tablet  Take 100 mg by mouth  daily.  Dose: 100 mg     torsemide 10 mg tablet  Commonly known as: DEMADEX  Take 10 mg by mouth daily.  Dose: 10 mg            STOP taking these medications      cefuroxime 500 mg tablet  Commonly known as: CEFTIN     clopidogreL 75 mg tablet  Commonly known as: PLAVIX               Instructions for after discharge       Follow-up with primary physician (PCP)                 PROCEDURES / LABS / IMAGING      Results from last 7 days   Lab Units 10/22/24  0600 10/21/24  1203 10/21/24  0513   WBC K/uL 5.91 6.56 5.82   HEMOGLOBIN g/dL 7.9* 8.3* 8.0*   HEMATOCRIT % 23.7* 25.4* 25.0*   PLATELETS K/uL 107* 123* 122*     Results from last 7 days   Lab Units 10/22/24  0600 10/21/24  0513 10/18/24  0603   SODIUM mEQ/L 136 137 141   POTASSIUM mEQ/L 3.5 3.5 3.7   CHLORIDE mEQ/L 106 106 111*   CO2 mEQ/L 22 23 22   BUN mg/dL 22 19 21   CREATININE mg/dL 1.4* 1.4* 1.4*   GLUCOSE mg/dL 93 114* 95   CALCIUM mg/dL 7.4* 7.4* 7.8*       Pertinent Imaging  X-RAY CHEST 1 VIEW    Result Date: 10/17/2024  IMPRESSION: Moderate to large layering left pleural effusion, increased from the most recent chest radiograph from 9-24-24 and similar to the chest radiograph from 9/23/2024. Patchy airspace disease in the right lower lung zone, new from the prior.     CT ANGIOGRAPHY ABDOMEN PELVIS WITH AND WITHOUT IV CONTRAST    Result Date: 10/17/2024  IMPRESSION: Active GI bleed is noted involving the colon just proximal to the colostomy. This is similar location to the prior study however is not as pronounced. Finding:    Active GI bleeding   Acuity: Critical  Status:  CLOSED Critical read back was performed and results were read back by Cinda from [facility], on 10/17/2024 11:21 AM     IR EMBOLIZATION    Result Date: 10/15/2024  IMPRESSION:   Successful subselective SMA and MARIE arteriography without evidence of discrete active bleed or pseudoaneurysm. Successful wire dissection was achieved, at the origin of the proximal sigmoid artery, accounting for the  region of bleed noted on CTA. This should serve to limit flow to the distal sigmoid artery branch and allow for healing of a diverticular bleed over the next 24 to 48 hours. Further coil embolization was therefore not pursued. Following the procedure, the patient's bleeding into his colostomy bag notably decreased significantly. The patient denied any abdominal pain following the procedure. COMMENT: PROCEDURE: 1. Ultrasound-guided access of the right common femoral artery. 2. Subselective SMA arteriography. 3. Subselective MARIE arteriography. 4. Superselective sigmoid arteriography with wire dissection. 5. Arterial closure, utilizing a Mynx device. RADIOLOGIST: Brian Mcgarry MD ANESTHESIA:  Local 1% lidocaine. REFERENCE AIR KERMA: 114 mGy CONTRAST: 30  cc of Isovue 300 MEDICATIONS:  none DESCRIPTION OF PROCEDURE:    Written informed consent was obtained following explanation of risks and benefits of the procedure to the patient's son/POA. The patient was placed supine on the IR procedure table. Ultrasound-guided vascular access - The right groin was prepped and draped in the usual sterile fashion. Grayscale ultrasound evaluation demonstrated the right common femoral artery to be patent. Under real-time ultrasound guidance, the right common femoral artery was accessed with a 21-gauge needle. An ultrasound-guided access image was saved to PACS. An 018 wire was advanced centrally. Sequential exchange was made for a 5 Persian microcatheter sheath, 035 wire, and 5 Persian vascular sheath. The vascular sheath was connected to a pressurized saline flush system. Performance Met:  PQRS MEASURE 76, CPT 6030F:  All elements of maximal sterile technique were utilized, including cap, mask, sterile gown, sterile gloves, and sterile drape.  Additionally, sterile ultrasound techniques were followed, including use of sterile probe cover and sterile ultrasound gel. Utilizing a Contra 2 catheter, the SMA was subselected and  subselective SMA arteriography was performed. There is no evidence of active bleed or pseudoaneurysm. Utilizing a Contra 2, the MARIE was then subselected and subselective MARIE arteriography was performed. There is no evidence of active bleed or pseudoaneurysm. In correlating with the recent CTA, the territory of bleed correlated with the sigmoidal artery. Therefore, utilizing Progreat microcatheter with double angle GT microwire, the proximal sigmoidal artery was subselected. Notably, due to the patient's vascular tortuosity and vascular fragility, spasm/wire dissection of the proximal sigmoidal artery was achieved. Therefore, as this should serve as a temporary embolization of the proximal sigmoid artery and allow for healing of the diverticular bleed. Therefore, further coil embolization was not pursued. Arterial closure and hemostasis was achieved, utilizing a Mynx device. Dry sterile dressing was applied. Notably, at the conclusion of the procedure, there was significantly decreased blood within the patient's colostomy bag, compatible with a successful procedure. The patient denied any abdominal pain at the conclusion of the procedure. The number of guidewires used, and their integrity, was confirmed at the end of the procedure.    ULTRASOUND GUIDED VASCULAR ACCESS    Result Date: 10/15/2024  IMPRESSION:   Successful subselective SMA and MARIE arteriography without evidence of discrete active bleed or pseudoaneurysm. Successful wire dissection was achieved, at the origin of the proximal sigmoid artery, accounting for the region of bleed noted on CTA. This should serve to limit flow to the distal sigmoid artery branch and allow for healing of a diverticular bleed over the next 24 to 48 hours. Further coil embolization was therefore not pursued. Following the procedure, the patient's bleeding into his colostomy bag notably decreased significantly. The patient denied any abdominal pain following the procedure. COMMENT:  PROCEDURE: 1. Ultrasound-guided access of the right common femoral artery. 2. Subselective SMA arteriography. 3. Subselective MARIE arteriography. 4. Superselective sigmoid arteriography with wire dissection. 5. Arterial closure, utilizing a Mynx device. RADIOLOGIST: Brian Mcgarry MD ANESTHESIA:  Local 1% lidocaine. REFERENCE AIR KERMA: 114 mGy CONTRAST: 30  cc of Isovue 300 MEDICATIONS:  none DESCRIPTION OF PROCEDURE:    Written informed consent was obtained following explanation of risks and benefits of the procedure to the patient's son/POA. The patient was placed supine on the IR procedure table. Ultrasound-guided vascular access - The right groin was prepped and draped in the usual sterile fashion. Grayscale ultrasound evaluation demonstrated the right common femoral artery to be patent. Under real-time ultrasound guidance, the right common femoral artery was accessed with a 21-gauge needle. An ultrasound-guided access image was saved to PACS. An 018 wire was advanced centrally. Sequential exchange was made for a 5 Estonian microcatheter sheath, 035 wire, and 5 Estonian vascular sheath. The vascular sheath was connected to a pressurized saline flush system. Performance Met:  PQRS MEASURE 76, CPT 6030F:  All elements of maximal sterile technique were utilized, including cap, mask, sterile gown, sterile gloves, and sterile drape.  Additionally, sterile ultrasound techniques were followed, including use of sterile probe cover and sterile ultrasound gel. Utilizing a Contra 2 catheter, the SMA was subselected and subselective SMA arteriography was performed. There is no evidence of active bleed or pseudoaneurysm. Utilizing a Contra 2, the MARIE was then subselected and subselective MARIE arteriography was performed. There is no evidence of active bleed or pseudoaneurysm. In correlating with the recent CTA, the territory of bleed correlated with the sigmoidal artery. Therefore, utilizing Progreat microcatheter with double  angle GT microwire, the proximal sigmoidal artery was subselected. Notably, due to the patient's vascular tortuosity and vascular fragility, spasm/wire dissection of the proximal sigmoidal artery was achieved. Therefore, as this should serve as a temporary embolization of the proximal sigmoid artery and allow for healing of the diverticular bleed. Therefore, further coil embolization was not pursued. Arterial closure and hemostasis was achieved, utilizing a Mynx device. Dry sterile dressing was applied. Notably, at the conclusion of the procedure, there was significantly decreased blood within the patient's colostomy bag, compatible with a successful procedure. The patient denied any abdominal pain at the conclusion of the procedure. The number of guidewires used, and their integrity, was confirmed at the end of the procedure.    CT ANGIOGRAPHY ABDOMEN PELVIS WITH AND WITHOUT IV CONTRAST    Result Date: 10/15/2024  IMPRESSION: 1. Acute GI bleed within the colon just proximal to the left lower quadrant colostomy. 2. Age-indeterminate L1 vertebral body compression fracture, new since July 2024. Recommend correlation with acute back pain. 3. Moderate to large left pleural effusion. Finding:    Active GI bleeding   Acuity: Critical  Status:  CLOSED Critical read back was performed and results were read back by Dr. Suh on 10/15/2024 at 7:27 PM.     IR THORACENTESIS    Result Date: 9/24/2024  IMPRESSION: Successful ultrasound-guided left thoracentesis with 900 mL removed and sent to the lab.  All components of the time-out, debriefing, and handling of the specimen were conducted as per the ASAP Specimen Protocol. I certify that I have personally reviewed this examination and agree with Betsy Benitez's report. Elio Espinoza M.D.    X-RAY CHEST 1 VIEW    Result Date: 9/24/2024  IMPRESSION: Evaluation limited by positioning with the apices excluded on both views. Status post left thoracentesis without appreciable  pneumothorax. Moderate residual left effusion with underlying atelectasis/consolidative opacity and small right effusion.  Assessment of the right chest is further limited by the patient's hand which overlies the hemithorax.     X-RAY CHEST 1 VIEW    Result Date: 9/24/2024  IMPRESSION: New moderate right effusion and worsening large left pleural effusion with increasing airspace disease in the left upper lobe and vascular congestion. Likely related to decompensated heart failure/CHF, with superimposed pneumonia not excluded in the appropriate setting.     CT HEAD WITHOUT IV CONTRAST    Result Date: 9/23/2024  IMPRESSION: No evidence of hemorrhage, mass or acute territorial infarction by CT criteria. COMMENT: Technique: Computed tomography of the brain was performed utilizing contiguous 2.5 mm transaxial sections without intravenous contrast administration. CT DOSE:  One or more dose reduction techniques (e.g. automated exposure control, adjustment of the mA and/or kV according to patient size, use of iterative reconstruction technique) utilized for this examination. Comparison studies: Head CT 7/28/2024. Postsurgical change: None. Brain parenchyma: There is no intraparenchymal hemorrhage, mass effect, midline shift or extra-axial fluid collection. White matter changes: Normal for age Ventricles, cisterns, and sulci: Normal in size and configuration. Sella: Normal in appearance. Cerebellar tonsils: Normal. Calvarium and extra cranial soft tissues: Normal. Paranasal sinuses: Clear bilaterally. Mastoid air cell: Normal. Orbits: Normal.      OUTPATIENT  FOLLOW-UP / REFERRALS / PENDING TESTS        Outpatient Follow-Up Appointments            In 4 months Mat Bryant MD [facility]            Referrals  No orders of the defined types were placed in this encounter.      Test Results Pending at Discharge  Pending Inpatient Labs       Order Current Status    CBC Collected (10/17/24  1027)            Important Issues to Address in Follow-Up  Follow up with cardiology regarding transition of plavix to ASA  Follow up with PCP and repeat CBC    DISCHARGE DISPOSITION AND DESTINATION      Disposition: Home Health Care - [facility]  Destination: Home                            Code Status At Discharge: DNR (A.N.D.)    Physician Order for Life-Sustaining Treatment Document Status        No documents found                     >30 mins spent for coordination/counselling related to discharge plan, including final examination of patient, discussion regarding discharge instructions, reconciliation/prescription of medications, preparation of discharge records, etc.   35min total spent

## 2024-10-22 NOTE — NURSING NOTE
Patient just had another episode of rectal bleed ,  aware.discharge cancelled for now and repeat CBC at 4pm

## 2024-10-22 NOTE — DISCHARGE SUMMARY
Hospital Medicine    Inpatient Discharge Summary        BRIEF OVERVIEW   Patient: Samy Elena (10/6/1930)    Admitting Provider: Nicolas Lee DO  Attending Provider: Rhoda Herrera MD Attending phys phone: [phone]    PCP: Zachery Hernandez [phone]    Admission Date: 10/15/2024  Discharge Date: 10/24/2024     DISCHARGE DIAGNOSES      Primary Discharge Diagnosis  GI bleeding    Secondary Discharge Diagnoses  Active Hospital Problems    Diagnosis Date Noted    GI bleeding 10/15/2024     Priority: High    Coronary artery disease 08/03/2023     Priority: Medium    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[facility])      Priority: Low    Gastrointestinal hemorrhage, unspecified gastrointestinal hemorrhage type 10/17/2024    Compression fracture of lumbar vertebra (CMS/[facility]) 10/15/2024    Recurrent pleural effusion on left 10/15/2024    Shortness of breath 07/28/2024    History of recurrent UTIs 07/02/2024    CKD (chronic kidney disease) stage 4, GFR 15-29 ml/min (CMS/[facility]) 12/03/2023    Depression 11/29/2023    Hypothyroidism 11/29/2023    Debility 08/10/2022    Palliative care by specialist 08/10/2022    Hypertension       Resolved Hospital Problems   No resolved problems to display.          Problem List on Day of Discharge  * GI bleeding  Assessment & Plan  With history of recurrent diverticular and hemorrhoidal bleeds, not on AC but on plavix at home  Two different bleeds this admission  -initially had bleed in ostomy which resolved, then with rectal bleeding  -patient has end colostomy w/ rectal stump (transverse colon was divided) - this represents two separate bleeds    Ostomy bleed: resolved  CTA revealing acute GI bleed within the colon just proximal to the left lower quadrant colostomy.  IR performed successful subselective SMA and MARIE arteriography without evidence of discrete active bleed or pseudoaneurysm. Successful wire dissection at the origin of the sigmoidal artery, accounting for the territory of the  "bleed.   Suspect diverticular bleed.  10/17: Rebleed into the ostomy, darker red slow ooze/trickle with clots. Hemodynamically stable.  CTA with \"Active GI bleed is noted involving the colon just proximal to the colostomy. This is similar location to the prior study however is not as pronounced.\"  Discussed with both GI and IR. No IR intervention recommended as the site is close to the ostomy and does not appear to be from an arterial source. GI in agreement.   S/p colonoscopy through ostomy 10/18: noted diverticuli and 2 polyps which were small and not removed.     Rectal bleed - noted 10/21  -Plavix was resumed and planned for dc but had dark red blood clots per rectum 10/21 AM   -bleeding had sopped but recurrent 10/22, hgb dropped from 7.9 to 5.8 and responded to 2U PRBCs  -flex sig done 10/23 w/ extensive diverticulosis and visualized clots - suspect diverticular source  -per GI if significant rebleed order CTA and consult IR  -resumed metoprolol and diuretic    -had total 4U PRBCs this admission  -ordered IV iron, advise oral iron on discharge  -continue to hold plavix on dc, if no bleeding for 7 days can trial aspirin 81mg given hx CABG though possible he will not tolerate this, discussed with patient that he will need to follow up with his cardiologist to discuss further  Consult palliative     Coronary artery disease  Assessment & Plan  S/p CABG.    -Holding home Plavix in setting of second GI bleed this admission   -discussed starting asa 81mg if no further bleeding for 48hrs  -discussed need to follow up with cardiology - patient states he has an appointment in the next two weeks  -Does not appear to be on a statin     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  Presented for GIB and after large amount of blood was found in his ostomy bag.    -General surgery evaluated upon admission, management as per GI bleed problem    Recurrent pleural " effusion on left  Assessment & Plan  Last thoracentesis in 09/2024.  CT revealing moderate to large left pleural effusion.  Stable on RA.    -Monitor oxygen status closely  -Can consider repeat thoracentesis if having SOB    Compression fracture of lumbar vertebra (CMS/[facility])  Assessment & Plan  CT with incidental finding of age-indeterminate L1 vertebral body compression fracture, new since July 2024.  Pain is minimal.    -Pain control with lidocaine patch and PRN acetaminophen   -PT/OT rec home    Shortness of breath  Assessment & Plan  Denies chest pain.  Well saturated on room air. Hemodynamically stable.  Suspect anemia from GI bleed contributed  Also has a known moderate-large left pleural effusion and small right pleural effusion.  CXR with stable pleural effusion, new patcy airspace dx in RLL.  No longer feeling short of breath and remains stable on RA, no cough or signs of infection, unlikely to have PNA.  Resolved.      History of recurrent UTIs  Assessment & Plan  Recently started on course of  bactrim and changed to cefuroxime after home urine dip stick positive.  Denies dysuria.  UA abnormal.    -Urine cx growing ESBL E. Coli  -Hold on further antibx as is asymptomatic    CKD (chronic kidney disease) stage 4, GFR 15-29 ml/min (CMS/[facility])  Assessment & Plan  Cr of 1.7 upon admission appears at baseline ~1.6-1.7.  Cr below baseline.    -Will monitor Cr closely s/p CTA  -Avoid nephrotoxic agents and renally dose meds    Depression  Assessment & Plan  -Continuing home lexapro 5mg qD     Hypothyroidism  Assessment & Plan  -Continuing home levothyroxine     Hypertension  Assessment & Plan  BP continue to be low-normal.    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 M with colostomy (Dr. Bryant) for diversion s/p I&D for Fornier's gangrene (2022) admit with GI bleed into ostomy. CTA revealing acute GI bleed within the colon just proximal to the left lower quadrant colostomy, suspected diverticular. IR performed successful subselective SMA and MARIE arteriography without evidence of discrete active bleed or pseudoaneurysm. Successful wire dissection at the origin of the sigmoidal artery, accounting for the territory of the bleed. He did rebleed though less significant. GI did scope through the ostomy and noted diverticuli as well as 2 polyps which were small and not removed. He got 2 units PRBCs and hemoglobin stabilized. His plavix was resumed with downtrending hemoblobin and then had a rectal bleed. This was thought possibly to be hemorrhoidal but continued with additional 2U PRBCs prompting flex sig with large diverticular burden. Source of second bleed also thought diverticular.  Surgery recommended holding plavix.  Hemoglobin was stable and rectal bleeding resolved. He was advised to trial asa 81mg if no further bleeding in one week and to follow up with his cardiologist. This was also discussed with patient's son. IV iron given inpatient and discharged on PO.     Exam on Day of Discharge  Physical Exam  Vitals and nursing note reviewed.   Constitutional:       General: He is not in acute distress.     Comments: chronically ill-appearing.  Pleasant demeanor.   HENT:      Head: Normocephalic and atraumatic.      Nose: Nose normal.      Mouth/Throat:      Mouth: Mucous membranes are moist.   Eyes:      Pupils: Pupils are equal, round, and reactive to light.   Cardiovascular:      Rate and Rhythm: Normal rate. Rhythm irregular.       Heart sounds: Murmur heard.      Comments: Grade III/VI murmur  Pulmonary:      Effort: Pulmonary effort is normal. No respiratory distress.      Breath sounds: No wheezing.      Comments: diminished L lung   Abdominal:      General: Bowel sounds are normal. There is no distension.      Palpations: Abdomen is soft.      Tenderness: There is no abdominal tenderness.      Comments: Ostomy pink and patent, No bleeding/blood in bag, light brown soft to liquid stool in ostomy bag  Musculoskeletal:      Right lower leg: No edema.      Left lower leg: No edema.   Skin:     General: Skin is warm and dry.   Neurological:      Mental Status: He is alert and oriented to person, place, and time.   Psychiatric:         Mood and Affect: Mood normal.         Behavior: Behavior normal.         Thought Content: Thought content normal.     Consults During Admission  IP CONSULT TO GASTROENTEROLOGY  IP CONSULT TO CARDIOLOGY  IP CONSULT TO PAIN/PALLIATIVE CARE    DISCHARGE MEDICATIONS               Medication List        START taking these medications      aspirin 81 mg enteric coated tablet  Take 1 tablet (81 mg total) by mouth daily.  Dose: 81 mg     ferrous sulfate 300 mg (60 mg iron)/5 mL liquid  Take 5 mL (300 mg total) by mouth daily.  Dose: 300 mg            CONTINUE taking these medications      carboxymethylcellulose 0.5 % dropperette  Commonly known as: REFRESH PLUS  Administer 1 drop into both eyes 4 (four) times a day as needed for dry eyes.  Dose: 1 drop     cyanocobalamin 500 mcg tablet  Commonly known as: VITAMIN B12  Take 500 mcg by mouth every morning.  Dose: 500 mcg     escitalopram 5 mg tablet  Commonly known as: LEXAPRO  Take 5 mg by mouth every morning.  Dose: 5 mg     famotidine 10 mg tablet  Commonly known as: PEPCID  Take 1 tablet (10 mg total) by mouth daily Indications: gastroesophageal reflux disease.  Dose: 10 mg     finasteride 5 mg tablet  Commonly known as: PROSCAR  Take 1 tablet (5 mg total) by mouth  daily.  Dose: 5 mg     levothyroxine 100 mcg tablet  Commonly known as: SYNTHROID  Take 100 mcg by mouth daily.  Dose: 100 mcg     LUMIGAN 0.01 % ophthalmic drops  Administer 1 drop into the left eye nightly.  Dose: 1 drop  Generic drug: bimatoprost     methenamine 1 gram tablet  Commonly known as: HIPREX  Take 1 g by mouth daily before lunch.  Dose: 1 g     metoprolol succinate XL 25 mg 24 hr tablet  Commonly known as: TOPROL-XL  Take 12.5 mg by mouth daily with dinner.  Dose: 12.5 mg     tamsulosin 0.4 mg capsule  Commonly known as: FLOMAX  Take 1 capsule (0.4 mg total) by mouth daily.  Dose: 0.4 mg     thiamine 50 mg tablet  Take 100 mg by mouth daily.  Dose: 100 mg     torsemide 10 mg tablet  Commonly known as: DEMADEX  Take 10 mg by mouth daily.  Dose: 10 mg            STOP taking these medications      cefuroxime 500 mg tablet  Commonly known as: CEFTIN     clopidogreL 75 mg tablet  Commonly known as: PLAVIX               Instructions for after discharge       Follow-up with primary physician (PCP)                 PROCEDURES / LABS / IMAGING      Results from last 7 days   Lab Units 10/24/24  0310 10/23/24  2044 10/23/24  1127   WBC K/uL 4.90 4.62 5.10   HEMOGLOBIN g/dL 8.3* 8.8* 9.1*   HEMATOCRIT % 25.3* 27.5* 27.7*   PLATELETS K/uL 103* 107* 106*     Results from last 7 days   Lab Units 10/24/24  0310 10/23/24  0304 10/22/24  0600   SODIUM mEQ/L 138 135* 136   POTASSIUM mEQ/L 3.3* 3.5 3.5   CHLORIDE mEQ/L 108* 105 106   CO2 mEQ/L 21 21 22   BUN mg/dL 21 22 22   CREATININE mg/dL 1.0 1.2 1.4*   GLUCOSE mg/dL 82 108* 93   CALCIUM mg/dL 7.1* 7.9* 7.4*       Pertinent Imaging  X-RAY CHEST 1 VIEW    Result Date: 10/17/2024  IMPRESSION: Moderate to large layering left pleural effusion, increased from the most recent chest radiograph from 9-24-24 and similar to the chest radiograph from 9/23/2024. Patchy airspace disease in the right lower lung zone, new from the prior.     CT ANGIOGRAPHY ABDOMEN PELVIS WITH AND  WITHOUT IV CONTRAST    Result Date: 10/17/2024  IMPRESSION: Active GI bleed is noted involving the colon just proximal to the colostomy. This is similar location to the prior study however is not as pronounced. Finding:    Active GI bleeding   Acuity: Critical  Status:  CLOSED Critical read back was performed and results were read back by Cinda from [facility], on 10/17/2024 11:21 AM     IR EMBOLIZATION    Result Date: 10/15/2024  IMPRESSION:   Successful subselective SMA and MARIE arteriography without evidence of discrete active bleed or pseudoaneurysm. Successful wire dissection was achieved, at the origin of the proximal sigmoid artery, accounting for the region of bleed noted on CTA. This should serve to limit flow to the distal sigmoid artery branch and allow for healing of a diverticular bleed over the next 24 to 48 hours. Further coil embolization was therefore not pursued. Following the procedure, the patient's bleeding into his colostomy bag notably decreased significantly. The patient denied any abdominal pain following the procedure. COMMENT: PROCEDURE: 1. Ultrasound-guided access of the right common femoral artery. 2. Subselective SMA arteriography. 3. Subselective MARIE arteriography. 4. Superselective sigmoid arteriography with wire dissection. 5. Arterial closure, utilizing a Mynx device. RADIOLOGIST: Brian Mcgarry MD ANESTHESIA:  Local 1% lidocaine. REFERENCE AIR KERMA: 114 mGy CONTRAST: 30  cc of Isovue 300 MEDICATIONS:  none DESCRIPTION OF PROCEDURE:    Written informed consent was obtained following explanation of risks and benefits of the procedure to the patient's son/POA. The patient was placed supine on the IR procedure table. Ultrasound-guided vascular access - The right groin was prepped and draped in the usual sterile fashion. Grayscale ultrasound evaluation demonstrated the right common femoral artery to be patent. Under real-time ultrasound guidance, the right common femoral artery was accessed  with a 21-gauge needle. An ultrasound-guided access image was saved to PACS. An 018 wire was advanced centrally. Sequential exchange was made for a 5 Niuean microcatheter sheath, 035 wire, and 5 Niuean vascular sheath. The vascular sheath was connected to a pressurized saline flush system. Performance Met:  PQRS MEASURE 76, CPT 6030F:  All elements of maximal sterile technique were utilized, including cap, mask, sterile gown, sterile gloves, and sterile drape.  Additionally, sterile ultrasound techniques were followed, including use of sterile probe cover and sterile ultrasound gel. Utilizing a Contra 2 catheter, the SMA was subselected and subselective SMA arteriography was performed. There is no evidence of active bleed or pseudoaneurysm. Utilizing a Contra 2, the MARIE was then subselected and subselective MARIE arteriography was performed. There is no evidence of active bleed or pseudoaneurysm. In correlating with the recent CTA, the territory of bleed correlated with the sigmoidal artery. Therefore, utilizing Progreat microcatheter with double angle GT microwire, the proximal sigmoidal artery was subselected. Notably, due to the patient's vascular tortuosity and vascular fragility, spasm/wire dissection of the proximal sigmoidal artery was achieved. Therefore, as this should serve as a temporary embolization of the proximal sigmoid artery and allow for healing of the diverticular bleed. Therefore, further coil embolization was not pursued. Arterial closure and hemostasis was achieved, utilizing a Mynx device. Dry sterile dressing was applied. Notably, at the conclusion of the procedure, there was significantly decreased blood within the patient's colostomy bag, compatible with a successful procedure. The patient denied any abdominal pain at the conclusion of the procedure. The number of guidewires used, and their integrity, was confirmed at the end of the procedure.    ULTRASOUND GUIDED VASCULAR ACCESS    Result  Date: 10/15/2024  IMPRESSION:   Successful subselective SMA and MARIE arteriography without evidence of discrete active bleed or pseudoaneurysm. Successful wire dissection was achieved, at the origin of the proximal sigmoid artery, accounting for the region of bleed noted on CTA. This should serve to limit flow to the distal sigmoid artery branch and allow for healing of a diverticular bleed over the next 24 to 48 hours. Further coil embolization was therefore not pursued. Following the procedure, the patient's bleeding into his colostomy bag notably decreased significantly. The patient denied any abdominal pain following the procedure. COMMENT: PROCEDURE: 1. Ultrasound-guided access of the right common femoral artery. 2. Subselective SMA arteriography. 3. Subselective MARIE arteriography. 4. Superselective sigmoid arteriography with wire dissection. 5. Arterial closure, utilizing a Mynx device. RADIOLOGIST: Brian Mcgarry MD ANESTHESIA:  Local 1% lidocaine. REFERENCE AIR KERMA: 114 mGy CONTRAST: 30  cc of Isovue 300 MEDICATIONS:  none DESCRIPTION OF PROCEDURE:    Written informed consent was obtained following explanation of risks and benefits of the procedure to the patient's son/POA. The patient was placed supine on the IR procedure table. Ultrasound-guided vascular access - The right groin was prepped and draped in the usual sterile fashion. Grayscale ultrasound evaluation demonstrated the right common femoral artery to be patent. Under real-time ultrasound guidance, the right common femoral artery was accessed with a 21-gauge needle. An ultrasound-guided access image was saved to PACS. An 018 wire was advanced centrally. Sequential exchange was made for a 5 O[place]i microcatheter sheath, 035 wire, and 5 [place]i vascular sheath. The vascular sheath was connected to a pressurized saline flush system. Performance Met:  PQRS MEASURE 76, CPT 6030F:  All elements of maximal sterile technique were utilized, including cap,  mask, sterile gown, sterile gloves, and sterile drape.  Additionally, sterile ultrasound techniques were followed, including use of sterile probe cover and sterile ultrasound gel. Utilizing a Contra 2 catheter, the SMA was subselected and subselective SMA arteriography was performed. There is no evidence of active bleed or pseudoaneurysm. Utilizing a Contra 2, the MARIE was then subselected and subselective MARIE arteriography was performed. There is no evidence of active bleed or pseudoaneurysm. In correlating with the recent CTA, the territory of bleed correlated with the sigmoidal artery. Therefore, utilizing Progreat microcatheter with double angle GT microwire, the proximal sigmoidal artery was subselected. Notably, due to the patient's vascular tortuosity and vascular fragility, spasm/wire dissection of the proximal sigmoidal artery was achieved. Therefore, as this should serve as a temporary embolization of the proximal sigmoid artery and allow for healing of the diverticular bleed. Therefore, further coil embolization was not pursued. Arterial closure and hemostasis was achieved, utilizing a Mynx device. Dry sterile dressing was applied. Notably, at the conclusion of the procedure, there was significantly decreased blood within the patient's colostomy bag, compatible with a successful procedure. The patient denied any abdominal pain at the conclusion of the procedure. The number of guidewires used, and their integrity, was confirmed at the end of the procedure.    CT ANGIOGRAPHY ABDOMEN PELVIS WITH AND WITHOUT IV CONTRAST    Result Date: 10/15/2024  IMPRESSION: 1. Acute GI bleed within the colon just proximal to the left lower quadrant colostomy. 2. Age-indeterminate L1 vertebral body compression fracture, new since July 2024. Recommend correlation with acute back pain. 3. Moderate to large left pleural effusion. Finding:    Active GI bleeding   Acuity: Critical  Status:  CLOSED Critical read back was performed  and results were read back by Dr. Suh on 10/15/2024 at 7:27 PM.      OUTPATIENT  FOLLOW-UP / REFERRALS / PENDING TESTS        Outpatient Follow-Up Appointments            In 3 months Mat Bryant MD [facility]            Referrals  No orders of the defined types were placed in this encounter.      Test Results Pending at Discharge  Pending Inpatient Labs       Order Current Status    CBC Collected (10/17/24 1027)            Important Issues to Address in Follow-Up  Follow up with cardiology regarding transition of plavix to ASA  Follow up with PCP and repeat CBC    DISCHARGE DISPOSITION AND DESTINATION      Disposition: Home Health Care - [facility]  Destination: Home                            Code Status At Discharge: DNR (A.N.D.)    Physician Order for Life-Sustaining Treatment Document Status        No documents found                     >30 mins spent for coordination/counselling related to discharge plan, including final examination of patient, discussion regarding discharge instructions, reconciliation/prescription of medications, preparation of discharge records, etc.   35min total spent

## 2024-10-22 NOTE — PLAN OF CARE
Plan of Care Review  Plan of Care Reviewed With: patient  Progress: no change  Outcome Evaluation: patient is alert, oriented , out of bed with assist, another episode of rectal bleed, recheck hemoglobin is 5.8 , MD aware,  2 units of PRBC ordered to transfuse when blood available, fall safety measure in place, will continue to monitor

## 2024-10-22 NOTE — PROGRESS NOTES
Hospital to Home Program 10/22/24 @ 10:30 AM  Program closed by in paptient coordinators.   Closed to care management.   ACM removed name from care team.     Pilar Kirkpatrick RN BSN  Ambulatory Care Manager   [phone]

## 2024-10-22 NOTE — PROGRESS NOTES
Hospital Medicine     Daily Progress Note       SUBJECTIVE   Interval History: Patient seen this afternoon. Feeling well. Ostomy and rectum hasn't had any further blood, tolerating PO well, no LH, no SOB    Seen this afternoon - patient passed clots via rectum, agrees with monitoring CBC, possible transfusion; will monitor to ensure no large bleed, dc canceled    Spoke with patient's son - understands plan to continue to monitor     OBJECTIVE      Vital signs in last 24 hours:  Temp:  [36.4 °C (97.5 °F)-36.7 °C (98 °F)] 36.7 °C (98 °F)  Heart Rate:  [82-91] 88  Resp:  [17-20] 17  BP: ()/(58-71) 120/65    Intake/Output Summary (Last 24 hours) at 10/22/2024 1331  Last data filed at 10/22/2024 1248  Gross per 24 hour   Intake 100 ml   Output --   Net 100 ml       PHYSICAL EXAMINATION      Physical Exam  Vitals and nursing note reviewed.   Constitutional:       General: He is not in acute distress.     Comments: chronically ill-appearing.  Pleasant demeanor.   HENT:      Head: Normocephalic and atraumatic.      Nose: Nose normal.      Mouth/Throat:      Mouth: Mucous membranes are moist.   Eyes:      Pupils: Pupils are equal, round, and reactive to light.   Cardiovascular:      Rate and Rhythm: Normal rate. Rhythm irregular.      Heart sounds: Murmur heard.      Comments: Grade III/VI murmur  Pulmonary:      Effort: Pulmonary effort is normal. No respiratory distress.      Breath sounds: No wheezing.      Comments: diminished L lung   Abdominal:      General: Bowel sounds are normal. There is no distension.      Palpations: Abdomen is soft.      Tenderness: There is no abdominal tenderness.      Comments: Ostomy pink and patent, No bleeding/blood in bag, light brown soft to liquid stool in ostomy bag  Musculoskeletal:      Right lower leg: No edema.      Left lower leg: No edema.   Skin:     General: Skin is warm and dry.   Neurological:      Mental Status: He is alert and oriented to person, place, and time.  "  Psychiatric:         Mood and Affect: Mood normal.         Behavior: Behavior normal.         Thought Content: Thought content normal.      LINES, CATHETERS, DRAINS, AIRWAYS, AND WOUNDS   Lines, Drains, and Airways:  Wounds (agree with documentation and present on admission):  Peripheral IV (Adult) 10/15/24 Right Antecubital (Active)   Number of days: 5       Peripheral IV (Adult) 10/17/24 Anterior;Left Forearm (Active)   Number of days: 3       Colostomy LLQ (Active)   Number of days: 444         Comments:    LABS / IMAGING / TELE      Labs  I have reviewed the patient's pertinent labs.  Significant abnormals are Hgb 8.4.      Imaging  I have independently reviewed the pertinent imaging from the last 24 hrs.    ECG/Telemetry  I have independently reviewed the telemetry. No events for the last 24 hours.    ASSESSMENT AND PLAN      * GI bleeding  Assessment & Plan  Presented with 1 day of bleeding from ostomy; does have history of diverticulosis and prior history of bleeding.  Not on A/C but is on Plavix.  Hemodynamically stable on admission.  CTA revealing acute GI bleed within the colon just proximal to the left lower quadrant colostomy.  IR performed successful subselective SMA and MARIE arteriography without evidence of discrete active bleed or pseudoaneurysm. Successful wire dissection at the origin of the sigmoidal artery, accounting for the territory of the bleed.   Suspect diverticular bleed.    10/17: Rebleed into the ostomy, darker red slow ooze/trickle with clots. Hemodynamically stable.  CTA with \"Active GI bleed is noted involving the colon just proximal to the colostomy. This is similar location to the prior study however is not as pronounced.\"  Discussed with both GI and IR. No IR intervention recommended as the site is close to the ostomy and does not appear to be from an arterial source. GI in agreement.   S/p colonoscopy through ostomy 10/18: noted diverticuli and 2 polyps which were small and not " removed.     -Plavix resumed and had dark red blood clots per rectum 10/21 AM - plavix now on hold, on review he has a rectal stump so this is a second bleed, possibly hemorrhoidal  -hgb overall stable - this admit he is s/p 2u pRBCs, repeat CBC and transfuse prn  -continue to hold plavix on dc, can trial aspirin 81mg if no further bleeding, discussed with patient that he will need to follow up with his cardiologist to discuss further  -ordered IV iron, advise oral iron on discharge    Coronary artery disease  Assessment & Plan  S/p CABG.    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  Presented for GIB and after large amount of blood was found in his ostomy bag.    -General surgery evaluated upon admission, management as per GI bleed problem    Recurrent pleural effusion on left  Assessment & Plan  Last thoracentesis in 09/2024.  CT revealing moderate to large left pleural effusion.  Stable on RA.    -Monitor oxygen status closely  -Can consider repeat thoracentesis if having SOB    Compression fracture of lumbar vertebra (CMS/[facility])  Assessment & Plan  CT with incidental finding of age-indeterminate L1 vertebral body compression fracture, new since July 2024.  Pain is minimal.    -Pain control with lidocaine patch and PRN acetaminophen   -PT/OT rec home    Shortness of breath  Assessment & Plan  Denies chest pain.  Well saturated on room air. Hemodynamically stable.  Suspect anemia from GI bleed contributed  Also has a known moderate-large left pleural effusion and small right pleural effusion.  CXR with stable pleural effusion, new patcy airspace dx in RLL.  No longer feeling  short of breath and remains stable on RA, no cough or signs of infection, unlikely to have PNA.  Resolved.      History of recurrent UTIs  Assessment & Plan  Recently started on course of  bactrim and changed to cefuroxime after home urine dip stick positive.  Denies dysuria.  UA abnormal.    -Urine cx growing ESBL E. Coli  -Hold on further antibx as is asymptomatic    CKD (chronic kidney disease) stage 4, GFR 15-29 ml/min (CMS/[facility])  Assessment & Plan  Cr of 1.7 upon admission appears at baseline ~1.6-1.7.  Cr below baseline.    -Will monitor Cr closely s/p CTA  -Avoid nephrotoxic agents and renally dose meds    Depression  Assessment & Plan  -Continuing home lexapro 5mg qD     Hypothyroidism  Assessment & Plan  -Continuing home levothyroxine     Hypertension  Assessment & Plan  BP continue to be low-normal.    -resumed home metop and torsemide per cards        VTE Assessment: Pad[place]    VTE Prophylaxis:  Current anticoagulants:    None      Code Status: DNR (A.N.D.)      Estimated Discharge Date: 10/22/2024     Disposition Planning: pending clinical improvement     Rhoda Herrera MD  10/22/2024

## 2024-10-22 NOTE — PROGRESS NOTES
[facility] (General Surgery/MIS/Breast/[facility]) Pager: [phone]     Progress Note    Subjective     89 y.o. 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  VSS afebrile.  Patient did not report any bloody ostomy output overnight.  Also reports that per nursing he did not have any further blood per rectum.  Reports that he feels well.  Is still been tolerating a diet.  Plavix was stopped yesterday.  Hemoglobin has been stable.      Objective     Vital signs in last 24 hours:  Temp:  [36.4 °C (97.5 °F)-36.6 °C (97.9 °F)] 36.6 °C (97.9 °F)  Heart Rate:  [81-91] 90  Resp:  [18-20] 18  BP: ()/(57-71) 109/61      Intake/Output Summary (Last 24 hours) at 10/22/2024 0857  Last data filed at 10/21/2024 1115  Gross per 24 hour   Intake --   Output 150 ml   Net -150 ml     Intake/Output this shift:  No intake/output data recorded.    Physical Exam    General: Well appearing no acute distress  HEENT: Normocephalic, trachea midline, JVD  CV: /61, RRR HR 90  Pulm: Unlabored respirations, on room air 97 %  Abd: Soft, nontender nondistended, ostomy pink protruding, no evidence of old blood or active blood in the ostomy bag  Neuro: Intact, responds to questions appropriately      Labs  CBC Results         10/18/24 10/18/24 10/17/24     0603 0033 1659    WBC 5.67 6.14 5.23    RBC 2.33 2.50 2.26    HGB 7.4 8.0 7.4    HCT 22.9 25.0 22.9    MCV 98.3 100.0 101.3    MCH 31.8 32.0 32.7    MCHC 32.3 32.0 32.3     123 115          CMP Results         10/18/24 10/17/24 10/16/24     0603 0350 0520     140 142    K 3.7 4.1 4.4    Cl 111 110 112    CO2 22 24 23    Glucose 95 84 98    BUN 21 23 25    Creatinine 1.4 1.4 1.5    Calcium 7.8 8.2 7.8    Anion Gap  8 6 7    EGFR 46.9 46.9 43.1           Comment for EGFR at 0603 on 10/18/24: Calculation based on the Chronic Kidney Disease Epidemiology Collaboration (CKD-EPI) equation refit without adjustment for race.    Comment for EGFR at 0350 on 10/17/24: Calculation based on the Chronic Kidney Disease Epidemiology Collaboration (CKD-EPI) equation refit without adjustment for race.    Comment for EGFR at 0520 on 10/16/24: Calculation based on the Chronic Kidney Disease Epidemiology Collaboration (CKD-EPI) equation refit without adjustment for race.            Assessment/Plan     89 y.o. 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       Plan:  - F/U Hb  - Continue recommend holding Plavix  - Risk-benefit discussion between benefit of Plavix for coronary and neuroprotective effects must be weighed with risk of bleed in the setting of multiple bleeding events  - Further care per primary       Leo Patton MD  House Staff - General Surgery  [facility]  Pager: [phone] [facility]

## 2024-10-23 ENCOUNTER — ANESTHESIA EVENT (INPATIENT)
Dept: ENDOSCOPY | Facility: HOSPITAL | Age: 89
DRG: 393 | End: 2024-10-23
Payer: MEDICARE

## 2024-10-23 ENCOUNTER — ANESTHESIA (INPATIENT)
Dept: ENDOSCOPY | Facility: HOSPITAL | Age: 89
DRG: 393 | End: 2024-10-23
Payer: MEDICARE

## 2024-10-23 LAB
ANION GAP SERPL CALC-SCNC: 9 MEQ/L (ref 3–15)
BUN SERPL-MCNC: 22 MG/DL (ref 7–25)
CALCIUM SERPL-MCNC: 7.9 MG/DL (ref 8.6–10.3)
CHLORIDE SERPL-SCNC: 105 MEQ/L (ref 98–107)
CO2 SERPL-SCNC: 21 MEQ/L (ref 21–31)
CREAT SERPL-MCNC: 1.2 MG/DL (ref 0.7–1.3)
EGFRCR SERPLBLD CKD-EPI 2021: 56.4 ML/MIN/1.73M*2
ERYTHROCYTE [DISTWIDTH] IN BLOOD BY AUTOMATED COUNT: 18.4 % (ref 11.6–14.4)
ERYTHROCYTE [DISTWIDTH] IN BLOOD BY AUTOMATED COUNT: 19 % (ref 11.6–14.4)
ERYTHROCYTE [DISTWIDTH] IN BLOOD BY AUTOMATED COUNT: 19.5 % (ref 11.6–14.4)
GLUCOSE SERPL-MCNC: 108 MG/DL (ref 70–99)
HCT VFR BLD AUTO: 26.5 % (ref 40.1–51)
HCT VFR BLD AUTO: 27.5 % (ref 40.1–51)
HCT VFR BLD AUTO: 27.7 % (ref 40.1–51)
HGB BLD-MCNC: 8.8 G/DL (ref 13.7–17.5)
HGB BLD-MCNC: 8.9 G/DL (ref 13.7–17.5)
HGB BLD-MCNC: 9.1 G/DL (ref 13.7–17.5)
INR PPP: 1.6
MCH RBC QN AUTO: 31 PG (ref 28–33.2)
MCH RBC QN AUTO: 31.4 PG (ref 28–33.2)
MCH RBC QN AUTO: 31.9 PG (ref 28–33.2)
MCHC RBC AUTO-ENTMCNC: 32 G/DL (ref 32.2–36.5)
MCHC RBC AUTO-ENTMCNC: 32.9 G/DL (ref 32.2–36.5)
MCHC RBC AUTO-ENTMCNC: 33.6 G/DL (ref 32.2–36.5)
MCV RBC AUTO: 95 FL (ref 83–98)
MCV RBC AUTO: 95.5 FL (ref 83–98)
MCV RBC AUTO: 96.8 FL (ref 83–98)
PLATELET # BLD AUTO: 106 K/UL (ref 150–350)
PLATELET # BLD AUTO: 107 K/UL (ref 150–350)
PLATELET # BLD AUTO: 111 K/UL (ref 150–350)
PMV BLD AUTO: 9.6 FL (ref 9.4–12.4)
PMV BLD AUTO: 9.6 FL (ref 9.4–12.4)
PMV BLD AUTO: 9.8 FL (ref 9.4–12.4)
POTASSIUM SERPL-SCNC: 3.5 MEQ/L (ref 3.5–5.1)
PROTHROMBIN TIME: 18.4 SEC (ref 12.2–14.5)
RBC # BLD AUTO: 2.79 M/UL (ref 4.5–5.8)
RBC # BLD AUTO: 2.84 M/UL (ref 4.5–5.8)
RBC # BLD AUTO: 2.9 M/UL (ref 4.5–5.8)
SODIUM SERPL-SCNC: 135 MEQ/L (ref 136–145)
WBC # BLD AUTO: 4.62 K/UL (ref 3.8–10.5)
WBC # BLD AUTO: 5.1 K/UL (ref 3.8–10.5)
WBC # BLD AUTO: 6.21 K/UL (ref 3.8–10.5)

## 2024-10-23 PROCEDURE — 25800000 HC PHARMACY IV SOLUTIONS: Performed by: NURSE ANESTHETIST, CERTIFIED REGISTERED

## 2024-10-23 PROCEDURE — 99223 1ST HOSP IP/OBS HIGH 75: CPT | Performed by: NURSE PRACTITIONER

## 2024-10-23 PROCEDURE — 63600000 HC DRUGS/DETAIL CODE: Mod: JW | Performed by: NURSE ANESTHETIST, CERTIFIED REGISTERED

## 2024-10-23 PROCEDURE — 25800000 HC PHARMACY IV SOLUTIONS: Performed by: HOSPITALIST

## 2024-10-23 PROCEDURE — 37000002 HC ANESTHESIA MAC: Performed by: STUDENT IN AN ORGANIZED HEALTH CARE EDUCATION/TRAINING PROGRAM

## 2024-10-23 PROCEDURE — 71000011 HC PACU PHASE 1 EA ADDL MIN: Performed by: STUDENT IN AN ORGANIZED HEALTH CARE EDUCATION/TRAINING PROGRAM

## 2024-10-23 PROCEDURE — 63600000 HC DRUGS/DETAIL CODE: Mod: JZ | Performed by: HOSPITALIST

## 2024-10-23 PROCEDURE — 36415 COLL VENOUS BLD VENIPUNCTURE: CPT | Performed by: STUDENT IN AN ORGANIZED HEALTH CARE EDUCATION/TRAINING PROGRAM

## 2024-10-23 PROCEDURE — 80048 BASIC METABOLIC PNL TOTAL CA: CPT | Performed by: STUDENT IN AN ORGANIZED HEALTH CARE EDUCATION/TRAINING PROGRAM

## 2024-10-23 PROCEDURE — 99233 SBSQ HOSP IP/OBS HIGH 50: CPT | Performed by: HOSPITALIST

## 2024-10-23 PROCEDURE — 85027 COMPLETE CBC AUTOMATED: CPT | Performed by: HOSPITALIST

## 2024-10-23 PROCEDURE — 85610 PROTHROMBIN TIME: CPT | Performed by: HOSPITALIST

## 2024-10-23 PROCEDURE — 63700000 HC SELF-ADMINISTRABLE DRUG: Performed by: NURSE PRACTITIONER

## 2024-10-23 PROCEDURE — 75000125 HC SIGMOIDOSCOPY BIOPSY: Performed by: STUDENT IN AN ORGANIZED HEALTH CARE EDUCATION/TRAINING PROGRAM

## 2024-10-23 PROCEDURE — 21400000 HC ROOM AND CARE CCU/INTERMEDIATE

## 2024-10-23 PROCEDURE — 85027 COMPLETE CBC AUTOMATED: CPT | Performed by: STUDENT IN AN ORGANIZED HEALTH CARE EDUCATION/TRAINING PROGRAM

## 2024-10-23 PROCEDURE — 25000000 HC PHARMACY GENERAL: Performed by: NURSE ANESTHETIST, CERTIFIED REGISTERED

## 2024-10-23 PROCEDURE — 63700000 HC SELF-ADMINISTRABLE DRUG: Performed by: INTERNAL MEDICINE

## 2024-10-23 PROCEDURE — 71000001 HC PACU PHASE 1 INITIAL 30MIN: Performed by: STUDENT IN AN ORGANIZED HEALTH CARE EDUCATION/TRAINING PROGRAM

## 2024-10-23 RX ORDER — NOREPINEPHRINE BITARTRATE 0.02 MG/ML
0-90 INJECTION, SOLUTION INTRAVENOUS
Status: DISCONTINUED | OUTPATIENT
Start: 2024-10-23 | End: 2024-10-24

## 2024-10-23 RX ORDER — LIDOCAINE HYDROCHLORIDE 10 MG/ML
INJECTION, SOLUTION EPIDURAL; INFILTRATION; INTRACAUDAL; PERINEURAL AS NEEDED
Status: DISCONTINUED | OUTPATIENT
Start: 2024-10-23 | End: 2024-10-23 | Stop reason: SURG

## 2024-10-23 RX ORDER — SODIUM CHLORIDE 9 MG/ML
INJECTION, SOLUTION INTRAVENOUS CONTINUOUS PRN
Status: DISCONTINUED | OUTPATIENT
Start: 2024-10-23 | End: 2024-10-23 | Stop reason: SURG

## 2024-10-23 RX ORDER — DOBUTAMINE HYDROCHLORIDE 200 MG/100ML
5 INJECTION INTRAVENOUS CONTINUOUS
Status: DISCONTINUED | OUTPATIENT
Start: 2024-10-23 | End: 2024-10-24

## 2024-10-23 RX ORDER — PROPOFOL 200MG/20ML
SYRINGE (ML) INTRAVENOUS AS NEEDED
Status: DISCONTINUED | OUTPATIENT
Start: 2024-10-23 | End: 2024-10-23 | Stop reason: SURG

## 2024-10-23 RX ADMIN — LIDOCAINE HYDROCHLORIDE 5 ML: 10 INJECTION, SOLUTION EPIDURAL; INFILTRATION; INTRACAUDAL; PERINEURAL at 08:42

## 2024-10-23 RX ADMIN — Medication 500 MCG: at 11:04

## 2024-10-23 RX ADMIN — GUAIFENESIN 600 MG: 600 TABLET ORAL at 22:04

## 2024-10-23 RX ADMIN — ESCITALOPRAM OXALATE 5 MG: 5 TABLET, FILM COATED ORAL at 11:04

## 2024-10-23 RX ADMIN — PROPOFOL 10 MG: 10 INJECTION, EMULSION INTRAVENOUS at 08:43

## 2024-10-23 RX ADMIN — LATANOPROST 1 DROP: 50 SOLUTION/ DROPS OPHTHALMIC at 22:04

## 2024-10-23 RX ADMIN — SODIUM CHLORIDE: 9 INJECTION, SOLUTION INTRAVENOUS at 08:39

## 2024-10-23 RX ADMIN — PROPOFOL 10 MG: 10 INJECTION, EMULSION INTRAVENOUS at 08:45

## 2024-10-23 RX ADMIN — LEVOTHYROXINE SODIUM 100 MCG: 0.1 TABLET ORAL at 06:27

## 2024-10-23 RX ADMIN — GUAIFENESIN 600 MG: 600 TABLET ORAL at 11:04

## 2024-10-23 RX ADMIN — LIDOCAINE 4% 1 PATCH: 40 PATCH TOPICAL at 11:29

## 2024-10-23 RX ADMIN — THIAMINE HCL TAB 100 MG 100 MG: 100 TAB at 11:05

## 2024-10-23 RX ADMIN — FINASTERIDE 5 MG: 5 TABLET, FILM COATED ORAL at 11:03

## 2024-10-23 RX ADMIN — SODIUM CHLORIDE 125 MG: 9 INJECTION, SOLUTION INTRAVENOUS at 13:14

## 2024-10-23 ASSESSMENT — PAIN SCALES - GENERAL: PAIN_LEVEL: 0

## 2024-10-23 NOTE — CONSULTS
"[facility] Palliative Care Consult      Patient Name: Samy Elena                                                                                        Patient MRN: [id number]  Date / Time Note Created: 10/23/2024 2:09 PM  Attending Physician: Rhoda Herrera MD  Referring Physician: No ref. provider found  Primary Care Physician: Zachery Hernandez MD   This is Hospital Day: 9    Conversation/Goals of Care:    - GOC: Life Prolonging  - See Pall Care by Spec     Assessment/Plan  Debility  Assessment & Plan  - Debility likely multifactorial in the setting of GI bleed, diverticuli, L pleural effusion, compression fracture, CKD   - Living situation prior to hospitalization lives at home with 24/7 caregiver   - Baseline functional status is can ambulate short distances with a cane, walker, mostly WC bound, requires assist with ADLs  - Current Palliative Performance Status: 40%  - Baseline Palliative Performance Status: 40%   - Frailty in the context of advanced age and disease state   - Patient remains high risk for further deterioration and functional decline.    Plan:  - Continue current plan of care   - Palliative care will continue to follow for complex medical decision making     Palliative care by specialist  Assessment & Plan  89 y.o. with a PMH HFrEF, TR, diverticulitis s/p colostomy, chronic pleural effusion, Afib, CAD s/p CABG presenting after passing clots and dark red blood through his stoma. Endoscopy performed, diverticuli highly suspected.     - Code status: Do Not Resuscitate / Allow Natural Death reaffirmed   - Prognosis: Variable, dependent on goals   - Advance Directives: OOH DNR on file   - Surrogate Decision Maker: without LW, 4 sons would be equal decision makers   - Occupation: retired professor   - Capacity intact   - Spiritual & Existential Needs: [facility]   - In light of GIB requiring blood transfusion, pt would not be opposed to escalation to ICU, only \"if reasonable\". He states sons " who collaborate with sisters would make a decision at that time if he is unable to.             Reason for Consult:  Psychosocial support    HPI      Source: Chart review, medical team, patient    Samy Elena is an 89 y.o. male with a PMH HFrEF, TR, diverticulitis s/p colostomy, chronic pleural effusion, Afib, CAD s/p CABG presented on 10/15/24 after passing clots and dark red blood through his stoma.     Palliative Care consulted for psychosocial support. Patient is known to this Palliative Care team.     Patient seen at bedside, on exam, after endoscopy. He denies abdominal pain, reports feeling much improved. He is looking forward to eating pancakes this morning. He was appreciative of my visit.     Review of Systems:  All other systems reviewed and negative except as noted in the HPI.      Chart Review:  The following portions of the patient’s history were reviewed and updated as appropriate:    Code Status History:  Current Code Status: DNR (A.N.D.)    Past Family History  No family history on file.    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)     Coronary artery disease     Disease of thyroid gland     Will catheter in place     6/25 not at present    GI (gastrointestinal bleed)     Hypertension     Myocardial infarction (CMS/HCC)     Orthostatic hypotension     TIA (transient ischemic attack)        Past Surgical History  Past Surgical History   Procedure Laterality Date    cataract extraction right eye with implant and limbal relaxing incision Right 7/18/2024    Performed by Hayder Moses MD at  OR [facility]    Cataract extraction w/ intraocular lens implant Left     Cataract extraction with LRI and anterior vitrectomy left eye Left 11/16/2023    Performed by Hayder Moses MD at  OR [facility]     Cholecystectomy      Colostomy      COLOSTOMY LAPAROSCOPIC N/A 8/3/2022    Performed by Mat Bryant MD at [facility] OR    Coronary artery bypass graft      DIAGNOSTIC FLEXIBLE SIGMOIDOSCOPY WITH COLLECTION OF SPECIMEN BY WASHING N/A 10/18/2024    Performed by Maine Lopez MD at [facility] GI    INCISION AND DRAINAGE WITH DEBRIDMENT OF BUTTOCK, AND PERINEUM N/A 8/2/2022    Performed by Mat Bryant MD at [facility] OR    Joint replacement Left     Knee    RIGHT HEART CATH N/A 8/4/2022    Performed by Cristal Lacey MD at [facility] CARDIAC CATH/EP    Thyroidectomy      WASHOUT OF PERINEAL WOUND, COLONIC LAVAGE N/A 8/3/2022    Performed by Mat Bryant MD at [facility] OR       Social History   Social History     Socioeconomic History    Marital status:      Spouse name: None    Number of children: None    Years of education: None    Highest education level: None   Tobacco Use    Smoking status: Never    Smokeless tobacco: Never   Vaping Use    Vaping status: Never Used   Substance and Sexual Activity    Alcohol use: Not Currently     Comment: social    Drug use: Never    Sexual activity: Defer     Social Drivers of Health     Financial Resource Strain: Low Risk  (8/4/2023)    Overall Financial Resource Strain (CARDIA)     Difficulty of Paying Living Expenses: Not hard at all   Food Insecurity: No Food Insecurity (10/15/2024)    Hunger Vital Sign     Worried About Running Out of Food in the Last Year: Never true     Ran Out of Food in the Last Year: Never true   Transportation Needs: No Transportation Needs (10/16/2024)    PRAPARE - Transportation     Lack of Transportation (Medical): No     Lack of Transportation (Non-Medical): No   Housing Stability: Low Risk  (10/16/2024)    Housing Stability Vital Sign     Unable to Pay for Housing in the Last Year: No     Number of Times Moved in the Last Year: 0     Homeless in the Last Year: No       Jehovah's witness:  Judaism      Home Medications:   acetaminophen (TYLENOL) tablet 650 mg  650  mg oral q6h PRN    carboxymethylcellulose (REFRESH PLUS) 0.5 % ophthalmic dropperette 1 drop  1 drop Both Eyes 4x daily PRN    [Provider Managed Hold] clopidogreL (PLAVIX) tablet 75 mg  75 mg oral q AM    cyanocobalamin (VITAMIN B12) tablet 500 mcg  500 mcg oral q AM    DOBUTamine (DOBUTREX) infusion 500 mg/250 mL D5W (2,000 mcg/mL)  5 mcg/kg/min intravenous Continuous    escitalopram (LEXAPRO) tablet 5 mg  5 mg oral q AM    ferric gluconate (FERRLECIT) 125 mg in sodium chloride 0.9 % 100 mL IVPB  125 mg intravenous Daily    finasteride (PROSCAR) tablet 5 mg  5 mg oral Daily    guaiFENesin (MUCINEX) 12 hr ER tablet 600 mg  600 mg oral BID    latanoprost (XALATAN) 0.005 % ophthalmic solution 1 drop  1 drop Left Eye Nightly    levothyroxine (SYNTHROID) tablet 100 mcg  100 mcg oral Daily (6:30a)    lidocaine (ASPERCREME) 4 % topical patch 1 patch  1 patch Topical Daily    melatonin capsule 5 mg  5 mg oral Nightly PRN    [Provider Managed Hold] methenamine (HIPREX) tablet 1 g  1 g oral Daily    [Provider Managed Hold] metoprolol succinate ER (TOPROL-XL) pre-halved 24 hr ER tablet 12.5 mg  12.5 mg oral Daily with dinner    norepinephrine (LEVOPHED) 4 mg/250 mL (16 mcg/mL) in 0.9 % NaCl infusion  0-90 mcg/min intravenous Titrated    sodium chloride 0.9 % infusion  5 mL/hr intravenous PRN    thiamine (VITAMIN B1) tablet 100 mg  100 mg oral Daily    [Provider Managed Hold] torsemide (DEMADEX) tablet 10 mg  10 mg oral Daily     [facility]  AWARxE query reviewed with no concerns. script noted for tramadol     Current Medications:  Current Facility-Administered Medications   Medication Dose Route Frequency Provider Last Rate Last Admin    acetaminophen (TYLENOL) tablet 650 mg  650 mg oral q6h PRN Isai Suh DO        carboxymethylcellulose (REFRESH PLUS) 0.5 % ophthalmic dropperette 1 drop  1 drop Both Eyes 4x daily PRN Isai Suh DO        [Provider Managed Hold] clopidogreL (PLAVIX) tablet 75  mg  75 mg oral q AM Jesus Nicolas, DO   75 mg at 10/21/24 0828    cyanocobalamin (VITAMIN B12) tablet 500 mcg  500 mcg oral q AM Isai Suh DO   500 mcg at 10/23/24 1104    DOBUTamine (DOBUTREX) infusion 500 mg/250 mL D5W (2,000 mcg/mL)  5 mcg/kg/min intravenous Continuous zA Rivera CRNA        escitalopram (LEXAPRO) tablet 5 mg  5 mg oral q AM Isai Suh DO   5 mg at 10/23/24 1104    ferric gluconate (FERRLECIT) 125 mg in sodium chloride 0.9 % 100 mL IVPB  125 mg intravenous Daily Rhoda Herrera  mL/hr at 10/23/24 1314 125 mg at 10/23/24 1314    finasteride (PROSCAR) tablet 5 mg  5 mg oral Daily Isai Suh DO   5 mg at 10/23/24 1103    guaiFENesin (MUCINEX) 12 hr ER tablet 600 mg  600 mg oral BID Isha García CRNP   600 mg at 10/23/24 1104    latanoprost (XALATAN) 0.005 % ophthalmic solution 1 drop  1 drop Left Eye Nightly Isai Suh DO   1 drop at 10/22/24 2217    levothyroxine (SYNTHROID) tablet 100 mcg  100 mcg oral Daily (6:30a) Isai Suh DO   100 mcg at 10/23/24 0627    lidocaine (ASPERCREME) 4 % topical patch 1 patch  1 patch Topical Daily Isai Suh DO   1 patch at 10/23/24 1129    melatonin capsule 5 mg  5 mg oral Nightly PRN Isai Suh DO        [Provider Managed Hold] methenamine (HIPREX) tablet 1 g  1 g oral Daily Isai Suh DO        [Provider Managed Hold] metoprolol succinate ER (TOPROL-XL) pre-halved 24 hr ER tablet 12.5 mg  12.5 mg oral Daily with dinner Nicolas Lee, DO   12.5 mg at 10/22/24 1713    norepinephrine (LEVOPHED) 4 mg/250 mL (16 mcg/mL) in 0.9 % NaCl infusion  0-90 mcg/min intravenous Titrated Rivera, Az E, CRNA        sodium chloride 0.9 % infusion  5 mL/hr intravenous PRN Brittney León PA C        thiamine (VITAMIN B1) tablet 100 mg  100 mg oral Daily Isai Suh DO   100 mg at 10/23/24 1105    [Provider Managed Hold] torsemide (DEMADEX) tablet 10 mg  10 mg oral Daily Nicolas Lee DO   10 mg at  "10/22/24 0906       Allergies:  Allergies   Allergen Reactions    Jardiance [Empagliflozin] Other (see comments)     denise's gangrene       Objective    Physical Exam:    General:   NAD, chronically ill appearing, frail  Eyes:  Sclera Anicteric  ENMT:  Mucus Membranes dry, [place]  CV:  normal rate  Respiratory:  nonlabored  GI:  colostomy   Psych:  Appropriate and Cooperative  Neuro:  AAOx4  Skin:  warm and dry  Musculoskeletal: no gross deformities       Vitals:  Vitals:    10/23/24 1249   BP:    Pulse: 67   Resp:    Temp:    SpO2:        Intake & Output:  I/O last 3 completed shifts:  In: 760 [I.V.:10; Blood:650; IV Piggyback:100]  Out: 150 [Urine:100; Stool:50]      Laboratory Studies:  CBC Results      Results from last 7 days   Lab Units 10/23/24  1127 10/23/24  0304 10/22/24  1632   WBC K/uL 5.10 6.21 3.98   HEMOGLOBIN g/dL 9.1* 8.9* 5.8*   HEMATOCRIT % 27.7* 26.5* 18.0*   PLATELETS K/uL 106* 111* 87*            CMP Results      Results from last 7 days   Lab Units 10/23/24  0304 10/22/24  0600 10/21/24  0513   SODIUM mEQ/L 135* 136 137   POTASSIUM mEQ/L 3.5 3.5 3.5   CHLORIDE mEQ/L 105 106 106   CO2 mEQ/L 21 22 23   BUN mg/dL 22 22 19   CREATININE mg/dL 1.2 1.4* 1.4*   CALCIUM mg/dL 7.9* 7.4* 7.4*   GLUCOSE mg/dL 108* 93 114*          Troponin I Results      No results found for: \"TROPONINT\"       ABG Results                     Imaging and Other Studies:     X-RAY CHEST 1 VIEW    Result Date: 10/17/2024  IMPRESSION: Moderate to large layering left pleural effusion, increased from the most recent chest radiograph from 9-24-24 and similar to the chest radiograph from 9/23/2024. Patchy airspace disease in the right lower lung zone, new from the prior.     CT ANGIOGRAPHY ABDOMEN PELVIS WITH AND WITHOUT IV CONTRAST    Result Date: 10/17/2024  IMPRESSION: Active GI bleed is noted involving the colon just proximal to the colostomy. This is similar location to the prior study however is not as pronounced. Finding:   "  Active GI bleeding   Acuity: Critical  Status:  CLOSED Critical read back was performed and results were read back by Cinda from [facility], on 10/17/2024 11:21 AM     IR EMBOLIZATION    Result Date: 10/15/2024  IMPRESSION:   Successful subselective SMA and MARIE arteriography without evidence of discrete active bleed or pseudoaneurysm. Successful wire dissection was achieved, at the origin of the proximal sigmoid artery, accounting for the region of bleed noted on CTA. This should serve to limit flow to the distal sigmoid artery branch and allow for healing of a diverticular bleed over the next 24 to 48 hours. Further coil embolization was therefore not pursued. Following the procedure, the patient's bleeding into his colostomy bag notably decreased significantly. The patient denied any abdominal pain following the procedure. COMMENT: PROCEDURE: 1. Ultrasound-guided access of the right common femoral artery. 2. Subselective SMA arteriography. 3. Subselective MARIE arteriography. 4. Superselective sigmoid arteriography with wire dissection. 5. Arterial closure, utilizing a Mynx device. RADIOLOGIST: Brian Mcgarry MD ANESTHESIA:  Local 1% lidocaine. REFERENCE AIR KERMA: 114 mGy CONTRAST: 30  cc of Isovue 300 MEDICATIONS:  none DESCRIPTION OF PROCEDURE:    Written informed consent was obtained following explanation of risks and benefits of the procedure to the patient's son/POA. The patient was placed supine on the IR procedure table. Ultrasound-guided vascular access - The right groin was prepped and draped in the usual sterile fashion. Grayscale ultrasound evaluation demonstrated the right common femoral artery to be patent. Under real-time ultrasound guidance, the right common femoral artery was accessed with a 21-gauge needle. An ultrasound-guided access image was saved to PACS. An 018 wire was advanced centrally. Sequential exchange was made for a 5 Hungarian microcatheter sheath, 035 wire, and 5 Hungarian vascular sheath.  The vascular sheath was connected to a pressurized saline flush system. Performance Met:  PQRS MEASURE 76, CPT 6030F:  All elements of maximal sterile technique were utilized, including cap, mask, sterile gown, sterile gloves, and sterile drape.  Additionally, sterile ultrasound techniques were followed, including use of sterile probe cover and sterile ultrasound gel. Utilizing a Contra 2 catheter, the SMA was subselected and subselective SMA arteriography was performed. There is no evidence of active bleed or pseudoaneurysm. Utilizing a Contra 2, the MARIE was then subselected and subselective MARIE arteriography was performed. There is no evidence of active bleed or pseudoaneurysm. In correlating with the recent CTA, the territory of bleed correlated with the sigmoidal artery. Therefore, utilizing Progreat microcatheter with double angle GT microwire, the proximal sigmoidal artery was subselected. Notably, due to the patient's vascular tortuosity and vascular fragility, spasm/wire dissection of the proximal sigmoidal artery was achieved. Therefore, as this should serve as a temporary embolization of the proximal sigmoid artery and allow for healing of the diverticular bleed. Therefore, further coil embolization was not pursued. Arterial closure and hemostasis was achieved, utilizing a Mynx device. Dry sterile dressing was applied. Notably, at the conclusion of the procedure, there was significantly decreased blood within the patient's colostomy bag, compatible with a successful procedure. The patient denied any abdominal pain at the conclusion of the procedure. The number of guidewires used, and their integrity, was confirmed at the end of the procedure.    ULTRASOUND GUIDED VASCULAR ACCESS    Result Date: 10/15/2024  IMPRESSION:   Successful subselective SMA and MARIE arteriography without evidence of discrete active bleed or pseudoaneurysm. Successful wire dissection was achieved, at the origin of the proximal sigmoid  artery, accounting for the region of bleed noted on CTA. This should serve to limit flow to the distal sigmoid artery branch and allow for healing of a diverticular bleed over the next 24 to 48 hours. Further coil embolization was therefore not pursued. Following the procedure, the patient's bleeding into his colostomy bag notably decreased significantly. The patient denied any abdominal pain following the procedure. COMMENT: PROCEDURE: 1. Ultrasound-guided access of the right common femoral artery. 2. Subselective SMA arteriography. 3. Subselective MARIE arteriography. 4. Superselective sigmoid arteriography with wire dissection. 5. Arterial closure, utilizing a Mynx device. RADIOLOGIST: Brian Mcgarry MD ANESTHESIA:  Local 1% lidocaine. REFERENCE AIR KERMA: 114 mGy CONTRAST: 30  cc of Isovue 300 MEDICATIONS:  none DESCRIPTION OF PROCEDURE:    Written informed consent was obtained following explanation of risks and benefits of the procedure to the patient's son/POA. The patient was placed supine on the IR procedure table. Ultrasound-guided vascular access - The right groin was prepped and draped in the usual sterile fashion. Grayscale ultrasound evaluation demonstrated the right common femoral artery to be patent. Under real-time ultrasound guidance, the right common femoral artery was accessed with a 21-gauge needle. An ultrasound-guided access image was saved to PACS. An 018 wire was advanced centrally. Sequential exchange was made for a 5 Ethiopian microcatheter sheath, 035 wire, and 5 Ethiopian vascular sheath. The vascular sheath was connected to a pressurized saline flush system. Performance Met:  PQRS MEASURE 76, CPT 6030F:  All elements of maximal sterile technique were utilized, including cap, mask, sterile gown, sterile gloves, and sterile drape.  Additionally, sterile ultrasound techniques were followed, including use of sterile probe cover and sterile ultrasound gel. Utilizing a Contra 2 catheter, the SMA was  subselected and subselective SMA arteriography was performed. There is no evidence of active bleed or pseudoaneurysm. Utilizing a Contra 2, the MARIE was then subselected and subselective MARIE arteriography was performed. There is no evidence of active bleed or pseudoaneurysm. In correlating with the recent CTA, the territory of bleed correlated with the sigmoidal artery. Therefore, utilizing Progreat microcatheter with double angle GT microwire, the proximal sigmoidal artery was subselected. Notably, due to the patient's vascular tortuosity and vascular fragility, spasm/wire dissection of the proximal sigmoidal artery was achieved. Therefore, as this should serve as a temporary embolization of the proximal sigmoid artery and allow for healing of the diverticular bleed. Therefore, further coil embolization was not pursued. Arterial closure and hemostasis was achieved, utilizing a Mynx device. Dry sterile dressing was applied. Notably, at the conclusion of the procedure, there was significantly decreased blood within the patient's colostomy bag, compatible with a successful procedure. The patient denied any abdominal pain at the conclusion of the procedure. The number of guidewires used, and their integrity, was confirmed at the end of the procedure.    CT ANGIOGRAPHY ABDOMEN PELVIS WITH AND WITHOUT IV CONTRAST    Result Date: 10/15/2024  IMPRESSION: 1. Acute GI bleed within the colon just proximal to the left lower quadrant colostomy. 2. Age-indeterminate L1 vertebral body compression fracture, new since July 2024. Recommend correlation with acute back pain. 3. Moderate to large left pleural effusion. Finding:    Active GI bleeding   Acuity: Critical  Status:  CLOSED Critical read back was performed and results were read back by Dr. Suh on 10/15/2024 at 7:27 PM.     IR THORACENTESIS    Result Date: 9/24/2024  IMPRESSION: Successful ultrasound-guided left thoracentesis with 900 mL removed and sent to the lab.  All  components of the time-out, debriefing, and handling of the specimen were conducted as per the ASAP Specimen Protocol. I certify that I have personally reviewed this examination and agree with Betsy Benitez's report. Elio Espinoza M.D.    X-RAY CHEST 1 VIEW    Result Date: 9/24/2024  IMPRESSION: Evaluation limited by positioning with the apices excluded on both views. Status post left thoracentesis without appreciable pneumothorax. Moderate residual left effusion with underlying atelectasis/consolidative opacity and small right effusion.  Assessment of the right chest is further limited by the patient's hand which overlies the hemithorax.     X-RAY CHEST 1 VIEW    Result Date: 9/24/2024  IMPRESSION: New moderate right effusion and worsening large left pleural effusion with increasing airspace disease in the left upper lobe and vascular congestion. Likely related to decompensated heart failure/CHF, with superimposed pneumonia not excluded in the appropriate setting.     CT HEAD WITHOUT IV CONTRAST    Result Date: 9/23/2024  IMPRESSION: No evidence of hemorrhage, mass or acute territorial infarction by CT criteria. COMMENT: Technique: Computed tomography of the brain was performed utilizing contiguous 2.5 mm transaxial sections without intravenous contrast administration. CT DOSE:  One or more dose reduction techniques (e.g. automated exposure control, adjustment of the mA and/or kV according to patient size, use of iterative reconstruction technique) utilized for this examination. Comparison studies: Head CT 7/28/2024. Postsurgical change: None. Brain parenchyma: There is no intraparenchymal hemorrhage, mass effect, midline shift or extra-axial fluid collection. White matter changes: Normal for age Ventricles, cisterns, and sulci: Normal in size and configuration. Sella: Normal in appearance. Cerebellar tonsils: Normal. Calvarium and extra cranial soft tissues: Normal. Paranasal sinuses: Clear bilaterally. Mastoid  air cell: Normal. Orbits: Normal.           I have independently reviewed the pertinent imaging to the time of note and agree with reported results. CXR reviewed- no ICD.     I have independently reviewed the pertinent cardiac studies to the time of note and agree with reported results. prolonged Qtc: 520ms    Labs reviewed: anemia, thrombocytopenia     Discussed with Medical Team (Dr. Rhoda Herrera).     Thank you for the opportunity to participate in this patient's hospital plan of care.     HEIDI Bauer  Palliative Care Nurse Practitioner  [facility]  [phone] [facility] office  [phone] [facility] Fax  [address] [place] [facility] [place]  [place] [id number]

## 2024-10-23 NOTE — ANESTHESIOLOGIST PRE-PROCEDURE ATTESTATION
Pre-Procedure Patient Identification:  I am the Primary Anesthesiologist and have identified the patient on 10/23/24 at 3:19 PM.   I have confirmed the procedure(s) will be performed by the following surgeon/proceduralist David Holcomb MD.

## 2024-10-23 NOTE — ANESTHESIA PREPROCEDURE EVALUATION
Relevant Problems   CARDIOVASCULAR   (+) A-fib (CMS/HCC)   (+) Atrial fibrillation (CMS/HCC)   (+) Coronary artery disease   (+) Hypertension   (+) Ischemic congestive cardiomyopathy (CMS/HCC)      GASTROINTESTINAL   (+) Dysphagia   (+) GERD (gastroesophageal reflux disease)   (+) GI bleeding   (+) Gastrointestinal hemorrhage, unspecified gastrointestinal hemorrhage type      HEMATOLOGY   (+) Anemia due to chronic kidney disease   (+) Thrombocytopenia (CMS/HCC)      NEUROLOGY   (+) Depression      RESPIRATORY SYSTEM   (+) Shortness of breath      URINARY SYSTEM   (+) CALI (acute kidney injury) (CMS/HCC)      Other   (+) Hypothyroidism     IR performed successful subselective SMA and MARIE arteriography without evidence of discrete active bleed or PSA. Successful wire dissection at the origin of the  sigmoidal artery, accounting for the territory of the bleed. 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)     Coronary artery disease     Disease of thyroid gland     Will catheter in place     6/25 not at present    GI (gastrointestinal bleed)     Hypertension     Myocardial infarction (CMS/HCC)     Orthostatic hypotension     TIA (transient ischemic attack)        Past Surgical History   Procedure Laterality Date    cataract extraction right eye with implant and limbal relaxing incision Right 7/18/2024    Performed by Hayder Moses MD at  OR [facility]    Cataract extraction w/ intraocular lens implant Left     Cataract extraction with LRI and anterior vitrectomy left eye Left 11/16/2023    Performed by Hayder Moses MD at  OR [facility]    Cholecystectomy      Colostomy      COLOSTOMY LAPAROSCOPIC N/A 8/3/2022    Performed by Mat Bryant MD at [facility] OR    Coronary artery bypass graft      DIAGNOSTIC FLEXIBLE  SIGMOIDOSCOPY WITH COLLECTION OF SPECIMEN BY WASHING N/A 10/18/2024    Performed by Maine Lopez MD at [facility] GI    INCISION AND DRAINAGE WITH DEBRIDMENT OF BUTTOCK, AND PERINEUM N/A 8/2/2022    Performed by Mat Bryant MD at [facility] OR    Joint replacement Left     Knee    RIGHT HEART CATH N/A 8/4/2022    Performed by Cristal Lacey MD at [facility] CARDIAC CATH/EP    Thyroidectomy      WASHOUT OF PERINEAL WOUND, COLONIC LAVAGE N/A 8/3/2022    Performed by Mat Bryant MD at [facility] OR       Social History     Socioeconomic History    Marital status:      Spouse name: Not on file    Number of children: Not on file    Years of education: Not on file    Highest education level: Not on file   Occupational History    Not on file   Tobacco Use    Smoking status: Never    Smokeless tobacco: Never   Vaping Use    Vaping status: Never Used   Substance and Sexual Activity    Alcohol use: Not Currently     Comment: social    Drug use: Never    Sexual activity: Defer   Other Topics Concern    Not on file   Social History Narrative    Not on file     Social Drivers of Health     Financial Resource Strain: Low Risk  (8/4/2023)    Overall Financial Resource Strain (CARDIA)     Difficulty of Paying Living Expenses: Not hard at all   Food Insecurity: No Food Insecurity (10/15/2024)    Hunger Vital Sign     Worried About Running Out of Food in the Last Year: Never true     Ran Out of Food in the Last Year: Never true   Transportation Needs: No Transportation Needs (10/16/2024)    PRAPARE - Transportation     Lack of Transportation (Medical): No     Lack of Transportation (Non-Medical): No   Physical Activity: Not on file   Stress: Not on file   Social Connections: Not on file   Intimate Partner Violence: Not on file   Housing Stability: Low Risk  (10/16/2024)    Housing Stability Vital Sign     Unable to Pay for Housing in the Last Year: No     Number of Times Moved in the Last Year: 0     Homeless in the Last Year: No        Allergies   Allergen Reactions    Jardiance [Empagliflozin] Other (see comments)     denise'mignon alexander         Current Facility-Administered Medications:     acetaminophen (TYLENOL) tablet 650 mg, 650 mg, oral, q6h PRN, Isai Suh DO    carboxymethylcellulose (REFRESH PLUS) 0.5 % ophthalmic dropperette 1 drop, 1 drop, Both Eyes, 4x daily PRN, Isai Suh DO    [Provider Managed Hold] clopidogreL (PLAVIX) tablet 75 mg, 75 mg, oral, q AM, JesusSongin, DO, 75 mg at 10/21/24 0828    cyanocobalamin (VITAMIN B12) tablet 500 mcg, 500 mcg, oral, q AM, Isai Suh DO, 500 mcg at 10/22/24 0905    escitalopram (LEXAPRO) tablet 5 mg, 5 mg, oral, q AM, Isai Suh DO, 5 mg at 10/22/24 0905    finasteride (PROSCAR) tablet 5 mg, 5 mg, oral, Daily, Isai Suh DO, 5 mg at 10/22/24 0906    guaiFENesin (MUCINEX) 12 hr ER tablet 600 mg, 600 mg, oral, BID, García, Isha, CRNP, 600 mg at 10/22/24 2100    latanoprost (XALATAN) 0.005 % ophthalmic solution 1 drop, 1 drop, Left Eye, Nightly, Isai Suh DO, 1 drop at 10/22/24 2217    levothyroxine (SYNTHROID) tablet 100 mcg, 100 mcg, oral, Daily (6:30a), Isai Suh DO, 100 mcg at 10/23/24 0627    lidocaine (ASPERCREME) 4 % topical patch 1 patch, 1 patch, Topical, Daily, Isai Suh DO, 1 patch at 10/22/24 0906    melatonin capsule 5 mg, 5 mg, oral, Nightly PRN, Isai Suh DO    [Provider Managed Hold] methenamine (HIPREX) tablet 1 g, 1 g, oral, Daily, Isai Suh DO    [Provider Managed Hold] metoprolol succinate ER (TOPROL-XL) pre-halved 24 hr ER tablet 12.5 mg, 12.5 mg, oral, Daily with dinner, Nicolas Lee DO, 12.5 mg at 10/22/24 1713    sodium chloride 0.9 % infusion, 5 mL/hr, intravenous, PRN, Brittney León PA C    thiamine (VITAMIN B1) tablet 100 mg, 100 mg, oral, Daily, Isai Suh DO, 100 mg at 10/22/24 0906    [Provider Managed Hold] torsemide (DEMADEX) tablet 10 mg, 10 mg, oral, Daily, Nicolas Lee DO, 10  mg at 10/22/24 0906    Vitals:    10/23/24 0019 10/23/24 0119 10/23/24 0219 10/23/24 0259   BP: 119/69 110/65 112/67    BP Location: Right upper arm Right upper arm Right upper arm    Patient Position:       Pulse: 79 79 79 79   Resp: 16 16 16    Temp: 36.8 °C (98.3 °F) 36.8 °C (98.3 °F) 36.8 °C (98.3 °F)    TempSrc: Oral Oral Oral    SpO2:       Weight:       Height:           Cardiac Imaging    TRANSTHORACIC ECHO (TTE) COMPLETE 09/24/2024    Interpretation Summary  Technically difficult study.    The left ventricular cavity size is small with mild left ventricular hypertrophy and mildly reduced systolic function. Estimated EF 40-45% by visual estimate. Abnormal septal motion due to RV volume overload. Unable to assess diastolic function due to technically difficult study. Low stroke volume (32 ml).    The aortic valve is tricuspid. Aortic valve and root sclerosis. Mild aortic regurgitation.    The mitral valve is thickened. Mild mitral annular calcification. Trace regurgitation.    The left atrium is normal in size.    The right ventricular cavity is massively dilated with severely decreased systolic function. Apical hypercontractility.    The pulmonic valve is not well seen. There is trace pulmonic regurgitation.    There is a 13 mm coaptation gap between the leaflets of the tricuspid valve resulting in torrential ( wide -open) tricuspid regurgitation (PISA EROA 2.4cm2, Rvol 96 mL). Tricuspid valve annulus is dilated and measures 6.8 cm in diameter. Unable to assess RVSP in the setting of torrential tricuspid regurgitation.    The right atrium is severely dilated.    The IVC is massively dilated and collapses less than 50% with inspiration consistent with severely elevated right atrial pressure.    No pericardial effusion. Large pleural effusion.    Compared to previous echocardiogram from 3/28/2024, no significant change.      CBC Results         10/23/24 10/22/24 10/22/24     0304 1632 0600    WBC 6.21 3.98  5.91    RBC 2.79 1.81 2.47    HGB 8.9 5.8 7.9    HCT 26.5 18.0 23.7    MCV 95.0 99.4 96.0    MCH 31.9 32.0 32.0    MCHC 33.6 32.2 33.3     87 107           Comment for HGB at 1632 on 10/22/24: This result has been called to Ladan Gutierrez RN by Jodie on 10/22/2024 17:54:36, and has been read back.     Comment for PLT at 1632 on 10/22/24: CONFIRMED WITH SMEAR ESTIMATE RESULTS OBTAINED AFTER VORTEXING TO ELIMINATE PLT CLUMPS          BMP Results         10/23/24 10/22/24 10/21/24     0304 0600 0513     136 137    K 3.5 3.5 3.5    Cl 105 106 106    CO2 21 22 23    Glucose 108 93 114    BUN 22 22 19    Creatinine 1.2 1.4 1.4    Calcium 7.9 7.4 7.4    Anion Gap 9 8 8    EGFR 56.4 46.9 46.9           Comment for EGFR at 0304 on 10/23/24: Calculation based on the Chronic Kidney Disease Epidemiology Collaboration (CKD-EPI) equation refit without adjustment for race.    Comment for EGFR at 0600 on 10/22/24: Calculation based on the Chronic Kidney Disease Epidemiology Collaboration (CKD-EPI) equation refit without adjustment for race.    Comment for EGFR at 0513 on 10/21/24: Calculation based on the Chronic Kidney Disease Epidemiology Collaboration (CKD-EPI) equation refit without adjustment for race.          PT/PTT Results         10/15/24 09/24/24 07/31/24     1212 0938 1103    PT 17.9 19.5 18.0    INR 1.5 1.7 1.5    PTT 32 -- --           Comment for INR at 1212 on 10/15/24: Moderate Intensity Anticoagulation = 2.0 to 3.0, High Intensity = 2.5 to 3.5    Comment for INR at 0938 on 09/24/24: Moderate Intensity Anticoagulation = 2.0 to 3.0, High Intensity = 2.5 to 3.5    Comment for INR at 1103 on 07/31/24: Moderate Intensity Anticoagulation = 2.0 to 3.0, High Intensity = 2.5 to 3.5          The patient does not have an active anticoagulation episode.    O    ROS/Med Hx         Physical Exam    Anesthesia Plan    Plan: MAC    Technique: MAC      Airway: natural airway / supplemental oxygen    3  ASA  Anesthetic plan and risks discussed with: patient  Induction:    intravenous   Postop Plan:   Patient Disposition: phase II then home   Pain Management: IV analgesics  Comments:    Plan: Patients undergoing surgery, particularly those with complex medical history and co-morbidities, are at elevated perioperative cardiac and respiratory risk due to the anesthetic and surgical stress. Complications such as prolonged mechanical ventilation, hypoxia, hypercarbia, hypotension, and the potential need for reintubation or non-invasive positive pressure ventilation may arise, contributing to the risk of severe outcomes. These risks, including cardiac instability and respiratory compromise, can lead to critical events such as myocardial infarction, respiratory failure, cerebral ischemia or even death. Careful monitoring and management are essential to mitigate these life-threatening complications. These risks were discussed with patient and/or consenting parties.

## 2024-10-23 NOTE — ASSESSMENT & PLAN NOTE
"89 y.o. with a PMH HFrEF, TR, diverticulitis s/p colostomy, chronic pleural effusion, Afib, CAD s/p CABG presenting after passing clots and dark red blood through his stoma. Endoscopy performed, diverticuli highly suspected.     - Code status: Do Not Resuscitate / Allow Natural Death reaffirmed   - Prognosis: Variable, dependent on goals   - Advance Directives: OOH DNR on file   - Surrogate Decision Maker: without LW, 4 sons would be equal decision makers   - Occupation: retired professor   - Capacity intact   - Spiritual & Existential Needs: Confucianist   - In light of GIB requiring blood transfusion, pt would not be opposed to escalation to ICU, only \"if reasonable\". He states sons who collaborate with sisters would make a decision at that time if he is unable to.     "

## 2024-10-23 NOTE — PROGRESS NOTES
Hospital Medicine     Daily Progress Note       SUBJECTIVE   Interval History: Patient seen this afternoon. Feeling well. Ostomy and rectum hasn't had any further blood, hungry, no LH, no SOB    Spoke with patient's son before and after scope - understands plan to continue to monitor     OBJECTIVE      Vital signs in last 24 hours:  Temp:  [35.6 °C (96 °F)-37.5 °C (99.5 °F)] 36.4 °C (97.6 °F)  Heart Rate:  [63-89] 67  Resp:  [16-18] 18  BP: ()/(53-69) 105/60    Intake/Output Summary (Last 24 hours) at 10/23/2024 1326  Last data filed at 10/23/2024 0851  Gross per 24 hour   Intake 710 ml   Output 150 ml   Net 560 ml       PHYSICAL EXAMINATION      Physical Exam  Vitals and nursing note reviewed.   Constitutional:       General: He is not in acute distress.     Comments: chronically ill-appearing.  Pleasant demeanor.   HENT:      Head: Normocephalic and atraumatic.      Nose: Nose normal.      Mouth/Throat:      Mouth: Mucous membranes are moist.   Eyes:      Pupils: Pupils are equal, round, and reactive to light.   Cardiovascular:      Rate and Rhythm: Normal rate. Rhythm irregular.      Heart sounds: Murmur heard.      Comments: Grade III/VI murmur  Pulmonary:      Effort: Pulmonary effort is normal. No respiratory distress.      Breath sounds: No wheezing.      Comments: diminished L lung   Abdominal:      General: Bowel sounds are normal. There is no distension.      Palpations: Abdomen is soft.      Tenderness: There is no abdominal tenderness.      Comments: Ostomy pink and patent, No bleeding/blood in bag, light brown soft to liquid stool in ostomy bag  Musculoskeletal:      Right lower leg: No edema.      Left lower leg: No edema.   Skin:     General: Skin is warm and dry.   Neurological:      Mental Status: He is alert and oriented to person, place, and time.   Psychiatric:         Mood and Affect: Mood normal.         Behavior: Behavior normal.         Thought Content: Thought content normal.     "  LINES, CATHETERS, DRAINS, AIRWAYS, AND WOUNDS   Lines, Drains, and Airways:  Wounds (agree with documentation and present on admission):  Peripheral IV (Adult) 10/15/24 Right Antecubital (Active)   Number of days: 5       Peripheral IV (Adult) 10/17/24 Anterior;Left Forearm (Active)   Number of days: 3       Colostomy LLQ (Active)   Number of days: 444         Comments:    LABS / IMAGING / TELE      Labs  I have reviewed the patient's pertinent labs.  Significant abnormals are Hgb 8.4.      Imaging  I have independently reviewed the pertinent imaging from the last 24 hrs.    ECG/Telemetry  I have independently reviewed the telemetry. No events for the last 24 hours.    ASSESSMENT AND PLAN      * GI bleeding  Assessment & Plan  With history of recurrent diverticular and hemorrhoidal bleeds, not on AC but on plavix at home  Two different bleeds this admission  -initially had bleed in ostomy which resolved, then with rectal bleeding  -patient has end colostomy w/ rectal stump (transverse colon was divided) - this represents two separate bleeds    Ostomy bleed: resolved  CTA revealing acute GI bleed within the colon just proximal to the left lower quadrant colostomy.  IR performed successful subselective SMA and MARIE arteriography without evidence of discrete active bleed or pseudoaneurysm. Successful wire dissection at the origin of the sigmoidal artery, accounting for the territory of the bleed.   Suspect diverticular bleed.  10/17: Rebleed into the ostomy, darker red slow ooze/trickle with clots. Hemodynamically stable.  CTA with \"Active GI bleed is noted involving the colon just proximal to the colostomy. This is similar location to the prior study however is not as pronounced.\"  Discussed with both GI and IR. No IR intervention recommended as the site is close to the ostomy and does not appear to be from an arterial source. GI in agreement.   S/p colonoscopy through ostomy 10/18: noted diverticuli and 2 polyps " which were small and not removed.     Rectal bleed - noted 10/21  -Plavix was resumed and planned for dc but had dark red blood clots per rectum 10/21 AM   -bleeding had sopped but recurrent 10/22, hgb dropped from 7.9 to 5.8 and responded to 2U PRBCs  -flex sig done 10/23 w/ extensive diverticulosis and visualized clots - suspect diverticular source  -per GI if significant rebleed order CTA and consult IR  -resume metoprolol and diuretic in AM if hgb stable    -continue to hold plavix on dc, if no bleeding for 7 days can trial aspirin 81mg given hx CABG though possible he will not tolerate this, discussed with patient that he will need to follow up with his cardiologist to discuss further  -ordered IV iron, advise oral iron on discharge  Consult palliative     Coronary artery disease  Assessment & Plan  S/p CABG.    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  Presented for GIB and after large amount of blood was found in his ostomy bag.    -General surgery evaluated upon admission, management as per GI bleed problem    Recurrent pleural effusion on left  Assessment & Plan  Last thoracentesis in 09/2024.  CT revealing moderate to large left pleural effusion.  Stable on RA.    -Monitor oxygen status closely  -Can consider repeat thoracentesis if having SOB    Compression fracture of lumbar vertebra (CMS/[facility])  Assessment & Plan  CT with incidental finding of age-indeterminate L1 vertebral body compression fracture, new since July 2024.  Pain is minimal.    -Pain control with lidocaine patch and PRN acetaminophen   -PT/OT rec home    Shortness of breath  Assessment & Plan  Denies  chest pain.  Well saturated on room air. Hemodynamically stable.  Suspect anemia from GI bleed contributed  Also has a known moderate-large left pleural effusion and small right pleural effusion.  CXR with stable pleural effusion, new patcy airspace dx in RLL.  No longer feeling short of breath and remains stable on RA, no cough or signs of infection, unlikely to have PNA.  Resolved.      History of recurrent UTIs  Assessment & Plan  Recently started on course of  bactrim and changed to cefuroxime after home urine dip stick positive.  Denies dysuria.  UA abnormal.    -Urine cx growing ESBL E. Coli  -Hold on further antibx as is asymptomatic    CKD (chronic kidney disease) stage 4, GFR 15-29 ml/min (CMS/[facility])  Assessment & Plan  Cr of 1.7 upon admission appears at baseline ~1.6-1.7.  Cr below baseline.    -Will monitor Cr closely s/p CTA  -Avoid nephrotoxic agents and renally dose meds    Depression  Assessment & Plan  -Continuing home lexapro 5mg qD     Hypothyroidism  Assessment & Plan  -Continuing home levothyroxine     Hypertension  Assessment & Plan  BP continue to be low-normal.    -resumed home metop and torsemide per cards        VTE Assessment: Padua    VTE Prophylaxis:  Current anticoagulants:    None      Code Status: DNR (A.N.D.)      Estimated Discharge Date: 10/25/2024     Disposition Planning: pending clinical improvement     Rhoda Herrera MD  10/23/2024

## 2024-10-23 NOTE — PLAN OF CARE
Problem: Adult Inpatient Plan of Care  Goal: Plan of Care Review  Outcome: Progressing  Flowsheets (Taken 10/23/2024 2232)  Progress: improving  Outcome Evaluation: Pt Hgb 5.8 at start of shift. 2 Units PRBCs given. Pt having multiple bleeding episodes with bright red blood and dark blood and clots coming from rectum. No blood visible in colostomy. 750ml tap water enemas given x2. Pt tolerated well. VSS. Hgb 8.9 post transfusion. No complaints. Bleeding subsided. Resting comfortably at end of shift.  Plan of Care Reviewed With: patient  Goal: Patient-Specific Goal (Individualized)  Outcome: Progressing  Goal: Absence of Hospital-Acquired Illness or Injury  Outcome: Progressing  Goal: Optimal Comfort and Wellbeing  Outcome: Progressing  Goal: Readiness for Transition of Care  Outcome: Progressing   Plan of Care Review  Plan of Care Reviewed With: patient  Progress: improving  Outcome Evaluation: Pt Hgb 5.8 at start of shift. 2 Units PRBCs given. Pt having multiple bleeding episodes with bright red blood and dark blood and clots coming from rectum. No blood visible in colostomy. 750ml tap water enemas given x2. Pt tolerated well. VSS. Hgb 8.9 post transfusion. No complaints. Bleeding subsided. Resting comfortably at end of shift.

## 2024-10-23 NOTE — PROGRESS NOTES
Brief GI post procedure note:     Patient tolerated procedure well with no immediate complications.     Findings of Flex Sig:   - Numerous diverticuli, blood in rectum and sigmoid. Clots obstructed lumen proximally so could not advance to diversion site.      Recommendations:  - Suspect bleeding is diverticular in nature, however unable to fully advance flex sig to site of diversion due to risk of perforation and clots obstructing lumen.   -If patient re-bleeds, pursue CTA and IR for embolization if extravasation noted.            Vern Campbell MD  Gastroenterology Fellow, PGY-4  Pager #[phone]

## 2024-10-23 NOTE — PLAN OF CARE
Care Coordination Discharge Plan Note     Discharge Needs Assessment  Concerns to be Addressed: denies needs/concerns at this time  Current Discharge Risk: chronically ill, dependent with mobility/activities of daily living    Anticipated Discharge Plan  Anticipated Discharge Disposition: home with home health  Type of Home Care Services: home health aide, home OT, home PT, nursing      Patient Choice  Offered/Gave Vendor List: yes  Patient's Choice of Community Agency(s): [facility]    Patient and/or patient guardian/advocate was made aware of their right to choose a provider. A list of eligible providers was presented and reviewed with the patient and/or patient guardian/advocate in written and/or verbal form. The list delineates providers in the patient’s desired geographic area who are participating in the Medicare program and/or providers contracted with the patient’s primary insurance. The Medicare list and quality ratings were obtained from the Medicare.gov [medicare.gov] website.    ---------------------------------------------------------------------------------------------------------------------    Interdisciplinary Discharge Plan Review:  Participants:, physical therapy, advanced practice provider, physician, nursing, social work/services, occupational therapy    Concerns Comments: Patient discussed in CPR. Continues with GIB, potential for more imaging/procedures. Palliative consulted for clarification with [facility]. Home with [facility] and [facility] once stable.    Discharge Plan:   Disposition/Destination: Home Health Care - [facility] / Home  Discharge Facility:    Community Resources:      Discharge Transportation:  Is Out of Hospital DNR needed at Discharge:    Does patient need discharge transport? No

## 2024-10-23 NOTE — H&P
Gastroenterology  History and Physical  Outpatient Procedure/s       HISTORY OF PRESENT ILLNESS   Subjective   Samy Elena is a 89 y.o. 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)     Coronary artery disease     Disease of thyroid gland     Will catheter in place     6/25 not at present    GI (gastrointestinal bleed)     Hypertension     Myocardial infarction (CMS/HCC)     Orthostatic hypotension     TIA (transient ischemic attack)      Past Surgical History   Procedure Laterality Date    cataract extraction right eye with implant and limbal relaxing incision Right 7/18/2024    Performed by Hayder Moses MD at  OR [facility]    Cataract extraction w/ intraocular lens implant Left     Cataract extraction with LRI and anterior vitrectomy left eye Left 11/16/2023    Performed by Hayder Moses MD at  OR [facility]    Cholecystectomy      Colostomy      COLOSTOMY LAPAROSCOPIC N/A 8/3/2022    Performed by Mat Bryant MD at [facility] OR    Coronary artery bypass graft      DIAGNOSTIC FLEXIBLE SIGMOIDOSCOPY WITH COLLECTION OF SPECIMEN BY WASHING N/A 10/18/2024    Performed by Maine Lopez MD at [facility] GI    INCISION AND DRAINAGE WITH DEBRIDMENT OF BUTTOCK, AND PERINEUM N/A 8/2/2022    Performed by Mat Bryant MD at [facility] OR    Joint replacement Left     Knee    RIGHT HEART CATH N/A 8/4/2022    Performed by Cristal Lacey MD at [facility] CARDIAC CATH/EP    Thyroidectomy      WASHOUT OF PERINEAL WOUND, COLONIC LAVAGE N/A 8/3/2022     Performed by Mat Bryant MD at [facility] OR       ALLERGIES     Allergies   Allergen Reactions    Jardiance [Empagliflozin] Other (see comments)     denise's gangrene       HOME MEDICATIONS     Prior to Admission medications    Medication Sig Start Date End Date Taking? Authorizing Provider   aspirin 81 mg enteric coated tablet Take 1 tablet (81 mg total) by mouth daily. 10/24/24 11/23/24 Yes Rhoda Herrera MD   bimatoprost (LUMIGAN) 0.01 % ophthalmic drops Administer 1 drop into the left eye nightly.   Yes ProviderClay MD   carboxymethylcellulose (REFRESH PLUS) 0.5 % dropperette Administer 1 drop into both eyes 4 (four) times a day as needed for dry eyes.   Yes ProviderClay MD   cefuroxime (CEFTIN) 500 mg tablet Take 500 mg by mouth 2 (two) times a day.   Yes ProviderClay MD   clopidogreL (PLAVIX) 75 mg tablet Take 75 mg by mouth every morning.   Yes ProviderClay MD   cyanocobalamin (VITAMIN B12) 500 mcg tablet Take 500 mcg by mouth every morning.   Yes ProviderClay MD   escitalopram (LEXAPRO) 5 mg tablet Take 5 mg by mouth every morning.   Yes ProviderClay MD   famotidine (PEPCID) 10 mg tablet Take 1 tablet (10 mg total) by mouth daily Indications: gastroesophageal reflux disease. 12/8/23 3/26/25 Yes Crystal Alonso,    ferrous sulfate 300 mg (60 mg iron)/5 mL liquid Take 5 mL (300 mg total) by mouth daily. 10/22/24 11/21/24 Yes Rhoda Herrera MD   finasteride (PROSCAR) 5 mg tablet Take 1 tablet (5 mg total) by mouth daily. 8/2/24 9/24/25 Yes Alex Morris,    levothyroxine (SYNTHROID) 100 mcg tablet Take 100 mcg by mouth daily.   Yes ProviderClay MD   methenamine (HIPREX) 1 gram tablet Take 1 g by mouth daily before lunch.   Yes ProviderClay MD   metoprolol succinate XL (TOPROL-XL) 25 mg 24 hr tablet Take 12.5 mg by mouth daily with dinner.   Yes ProviderClay MD   tamsulosin  (FLOMAX) 0.4 mg capsule Take 1 capsule (0.4 mg total) by mouth daily. 8/2/24 9/24/25 Yes Alex Morris,    thiamine 50 mg tablet Take 100 mg by mouth daily.   Yes Provider, [facility] MD John   torsemide (DEMADEX) 10 mg tablet Take 10 mg by mouth daily.   Yes Provider, [facility] MD John   amLODIPine (NORVASC) 2.5 mg tablet amlodipine 2.5 mg tablet  7/22/22  ProviderJohn MD   apixaban (ELIQUIS) 2.5 mg tablet   7/22/22  ProviderJohn MD   hydrochlorothiazide (MICROZIDE) 12.5 mg capsule hydrochlorothiazide 12.5 mg capsule  7/22/22  ProviderJohn MD        PHYSICAL EXAM   Visit Vitals  BP (!) 115/57   Pulse 75   Temp (!) 35.6 °C (96 °F) (Temporal)   Resp 16   Ht 1.829 m (6')   Wt 69.5 kg (153 lb 4.4 oz)   SpO2 100%   BMI 20.79 kg/m²       General appearance: no distress  Head: normocephalic  Lungs: clear to auscultation anteriorly   Heart: regular rate   Abdomen: soft, non-tender; bowel sounds normal  Neurologic: awake and alert and oriented     ASSESSMENT AND PLAN   Assessment   Sigmoidoscopy         David Holcomb MD  10/23/2024

## 2024-10-23 NOTE — ANESTHESIA POSTPROCEDURE EVALUATION
Patient: Samy Elena    Procedure Summary       Date: 10/23/24 Room / Location: [facility] 4 (D) / [facility]    Anesthesia Start: 0838 Anesthesia Stop: 0905    Procedure: FLEXIBLE SIGMOIDOSCOPY WITH BIOPSY (Anus) Diagnosis:       Gastrointestinal hemorrhage, unspecified gastrointestinal hemorrhage type      (Gastrointestinal hemorrhage, unspecified gastrointestinal hemorrhage type [K92.2])    Providers: David Holcomb MD Responsible Provider: Goyo Bowling MD    Anesthesia Type: MAC ASA Status: 3            Anesthesia Type: MAC  PACU Vitals  10/23/2024 0853 - 10/23/2024 0953        10/23/2024  0900 10/23/2024  0910 10/23/2024  0920 10/23/2024  0930    BP: 102/56 107/57 102/58 102/58    Temp: 35.7 °C (96.2 °F) -- -- --    Pulse: 67 73 69 71    Resp: 18 18 18 18    SpO2: 100 % -- 100 % 100 %              Anesthesia Post Evaluation    Pain score: 0  Pain management: adequate  Mode of pain management: IV medication  Patient location during evaluation: PACU  Patient participation: complete - patient participated  Level of consciousness: awake and alert  Cardiovascular status: acceptable  Airway Patency: adequate  Respiratory status: acceptable  Hydration status: acceptable  Anesthetic complications: no

## 2024-10-23 NOTE — ASSESSMENT & PLAN NOTE
- Debility likely multifactorial in the setting of GI bleed, diverticuli, L pleural effusion, compression fracture, CKD   - Living situation prior to hospitalization lives at home with 24/7 caregiver   - Baseline functional status is can ambulate short distances with a cane, walker, mostly WC bound, requires assist with ADLs  - Current Palliative Performance Status: 40%  - Baseline Palliative Performance Status: 40%   - Frailty in the context of advanced age and disease state   - Patient remains high risk for further deterioration and functional decline.    Plan:  - Continue current plan of care   - Palliative care will continue to follow for complex medical decision making

## 2024-10-24 VITALS
BODY MASS INDEX: 20.76 KG/M2 | WEIGHT: 153.28 LBS | DIASTOLIC BLOOD PRESSURE: 63 MMHG | TEMPERATURE: 97.5 F | SYSTOLIC BLOOD PRESSURE: 112 MMHG | OXYGEN SATURATION: 97 % | HEIGHT: 72 IN | RESPIRATION RATE: 16 BRPM | HEART RATE: 93 BPM

## 2024-10-24 LAB
ANION GAP SERPL CALC-SCNC: 9 MEQ/L (ref 3–15)
BUN SERPL-MCNC: 21 MG/DL (ref 7–25)
CALCIUM SERPL-MCNC: 7.1 MG/DL (ref 8.6–10.3)
CHLORIDE SERPL-SCNC: 108 MEQ/L (ref 98–107)
CO2 SERPL-SCNC: 21 MEQ/L (ref 21–31)
CREAT SERPL-MCNC: 1 MG/DL (ref 0.7–1.3)
CROSSMATCH: NORMAL
CROSSMATCH: NORMAL
EGFRCR SERPLBLD CKD-EPI 2021: >60 ML/MIN/1.73M*2
ERYTHROCYTE [DISTWIDTH] IN BLOOD BY AUTOMATED COUNT: 19.2 % (ref 11.6–14.4)
GLUCOSE SERPL-MCNC: 82 MG/DL (ref 70–99)
HCT VFR BLD AUTO: 25.3 % (ref 40.1–51)
HGB BLD-MCNC: 8.3 G/DL (ref 13.7–17.5)
ISBT CODE: 5100
ISBT CODE: 9500
MCH RBC QN AUTO: 31.9 PG (ref 28–33.2)
MCHC RBC AUTO-ENTMCNC: 32.8 G/DL (ref 32.2–36.5)
MCV RBC AUTO: 97.3 FL (ref 83–98)
PLATELET # BLD AUTO: 103 K/UL (ref 150–350)
PMV BLD AUTO: 10.2 FL (ref 9.4–12.4)
POTASSIUM SERPL-SCNC: 3.3 MEQ/L (ref 3.5–5.1)
PRODUCT CODE: NORMAL
PRODUCT CODE: NORMAL
PRODUCT STATUS: NORMAL
PRODUCT STATUS: NORMAL
RBC # BLD AUTO: 2.6 M/UL (ref 4.5–5.8)
SODIUM SERPL-SCNC: 138 MEQ/L (ref 136–145)
SPECIMEN EXP DATE BLD: NORMAL
SPECIMEN EXP DATE BLD: NORMAL
UNIT ABO: NORMAL
UNIT ABO: NORMAL
UNIT ID: NORMAL
UNIT ID: NORMAL
UNIT RH: NEGATIVE
UNIT RH: POSITIVE
WBC # BLD AUTO: 4.9 K/UL (ref 3.8–10.5)

## 2024-10-24 PROCEDURE — 63700000 HC SELF-ADMINISTRABLE DRUG: Performed by: NURSE PRACTITIONER

## 2024-10-24 PROCEDURE — 63600000 HC DRUGS/DETAIL CODE: Mod: JZ | Performed by: HOSPITALIST

## 2024-10-24 PROCEDURE — 63700000 HC SELF-ADMINISTRABLE DRUG: Performed by: INTERNAL MEDICINE

## 2024-10-24 PROCEDURE — 36415 COLL VENOUS BLD VENIPUNCTURE: CPT | Performed by: STUDENT IN AN ORGANIZED HEALTH CARE EDUCATION/TRAINING PROGRAM

## 2024-10-24 PROCEDURE — 85027 COMPLETE CBC AUTOMATED: CPT | Performed by: STUDENT IN AN ORGANIZED HEALTH CARE EDUCATION/TRAINING PROGRAM

## 2024-10-24 PROCEDURE — 25800000 HC PHARMACY IV SOLUTIONS: Performed by: HOSPITALIST

## 2024-10-24 PROCEDURE — 97530 THERAPEUTIC ACTIVITIES: CPT | Mod: GO,CO

## 2024-10-24 PROCEDURE — 99239 HOSP IP/OBS DSCHRG MGMT >30: CPT | Performed by: HOSPITALIST

## 2024-10-24 PROCEDURE — 63700000 HC SELF-ADMINISTRABLE DRUG: Performed by: HOSPITALIST

## 2024-10-24 PROCEDURE — 80048 BASIC METABOLIC PNL TOTAL CA: CPT | Performed by: STUDENT IN AN ORGANIZED HEALTH CARE EDUCATION/TRAINING PROGRAM

## 2024-10-24 RX ORDER — POTASSIUM CHLORIDE 20 MEQ/1
40 TABLET, EXTENDED RELEASE ORAL ONCE
Status: DISCONTINUED | OUTPATIENT
Start: 2024-10-24 | End: 2024-10-24 | Stop reason: HOSPADM

## 2024-10-24 RX ADMIN — Medication 500 MCG: at 08:22

## 2024-10-24 RX ADMIN — LEVOTHYROXINE SODIUM 100 MCG: 0.1 TABLET ORAL at 06:03

## 2024-10-24 RX ADMIN — ESCITALOPRAM OXALATE 5 MG: 5 TABLET, FILM COATED ORAL at 08:22

## 2024-10-24 RX ADMIN — FINASTERIDE 5 MG: 5 TABLET, FILM COATED ORAL at 08:23

## 2024-10-24 RX ADMIN — THIAMINE HCL TAB 100 MG 100 MG: 100 TAB at 08:22

## 2024-10-24 RX ADMIN — LIDOCAINE 4% 1 PATCH: 40 PATCH TOPICAL at 08:23

## 2024-10-24 RX ADMIN — SODIUM CHLORIDE 125 MG: 9 INJECTION, SOLUTION INTRAVENOUS at 10:32

## 2024-10-24 RX ADMIN — GUAIFENESIN 600 MG: 600 TABLET ORAL at 08:22

## 2024-10-24 RX ADMIN — TORSEMIDE 10 MG: 10 TABLET ORAL at 10:40

## 2024-10-24 ASSESSMENT — COGNITIVE AND FUNCTIONAL STATUS - GENERAL
DRESSING REGULAR UPPER BODY CLOTHING: 3 - A LITTLE
STANDING UP FROM CHAIR USING ARMS: 3 - A LITTLE
MOVING TO AND FROM BED TO CHAIR: 2 - A LOT
HELP NEEDED FOR PERSONAL GROOMING: 3 - A LITTLE
CLIMB 3 TO 5 STEPS WITH RAILING: 2 - A LOT
DRESSING REGULAR LOWER BODY CLOTHING: 3 - A LITTLE
HELP NEEDED FOR BATHING: 2 - A LOT
TOILETING: 2 - A LOT
EATING MEALS: 4 - NONE
WALKING IN HOSPITAL ROOM: 2 - A LOT
AFFECT: CONFUSED;WFL

## 2024-10-24 NOTE — PLAN OF CARE
Care Coordination Discharge Plan Note     Discharge Needs Assessment  Concerns to be Addressed: denies needs/concerns at this time  Current Discharge Risk: chronically ill, dependent with mobility/activities of daily living    Anticipated Discharge Plan  Anticipated Discharge Disposition: home with home health  Type of Home Care Services: home health aide, home OT, home PT, nursing      Patient Choice  Offered/Gave Vendor List: yes  Patient's Choice of Community Agency(s): [facility]    Patient and/or patient guardian/advocate was made aware of their right to choose a provider. A list of eligible providers was presented and reviewed with the patient and/or patient guardian/advocate in written and/or verbal form. The list delineates providers in the patient’s desired geographic area who are participating in the Medicare program and/or providers contracted with the patient’s primary insurance. The Medicare list and quality ratings were obtained from the Medicare.gov [medicare.gov] website.    ---------------------------------------------------------------------------------------------------------------------    Interdisciplinary Discharge Plan Review:  Participants:, physical therapy, advanced practice provider, physician, nursing, social work/services, occupational therapy, patient    Concerns Comments: Patient discussed in CPR. He is for d/c home this afternoon to resume services with [facility] and his private-duty HHA. Patient states one of this sons will pick him up, he gave verbal consent for IMM.    Discharge Plan:   Disposition/Destination: Home Health Care - [facility] / Home  Discharge Facility:    Community Resources:      Discharge Transportation:  Is Out of Hospital DNR needed at Discharge:    Does patient need discharge transport? No

## 2024-10-24 NOTE — PROGRESS NOTES
Occupational Therapy -  Daily Treatment/Progress Note     Patient: Samy Elena  Location: [place] [address]  MRN: [id number]  Today's date: 10/24/2024    HISTORY OF PRESENT ILLNESS     Samy is a 89 y.o. male admitted on 10/15/2024 with GI bleeding [K92.2]  Thrombocytopenia (CMS/[facility]) [D69.6]  Hemoglobin drop [R71.0]  Hemorrhage from enterostomy stoma (CMS/[facility]) [K94.11]  Anemia, unspecified type [D64.9]  Gastrointestinal hemorrhage, unspecified gastrointestinal hemorrhage type [K92.2]. Principal problem is GI bleeding.    Past Medical History  Samy has a past medical history of Aneurysm of internal iliac artery (CMS/[facility]), Atrial fibrillation (CMS/[facility]), CHF (congestive heart failure) (CMS/[facility]), 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[facility]), Coronary artery disease, Disease of thyroid gland, Will catheter in place, GI (gastrointestinal bleed), Hypertension, Myocardial infarction (CMS/[facility]), Orthostatic hypotension, and TIA (transient ischemic attack).    History of Present Illness  Presents with BRB from ostomy    s/p IR intervention on 10/10          PRIOR LEVEL OF FUNCTION AND LIVING ENVIRONMENT     Prior Level of Function      Flowsheet Row Most Recent Value   Dominant Hand right   Ambulation assistive equipment and person   Transferring assistive equipment and person   Toileting assistive person   Bathing assistive person   Dressing assistive person   Eating independent   IADLs assistive person   Driving/Transportation friends/family   Communication understands/communicates without difficulty   Prior Level of Function Comment pt has 24/7 HHA assists c ADLs, IALs, transfers, mostly uses w/c for mobility, occasional short distance amb with RW   Assistive Device Currently Used at Home wheelchair, ramps, walker, front-wheeled, walker, 4-wheeled, hospital bed, commode, 3-in-1             Prior Living Environment      Flowsheet Row Most Recent Value   People in Home other (see comments)   Current  Living Arrangements home   Home Accessibility ramp to enter home   Living Environment Comment pt has 24/7 HHA assists c ADLs, IALs, transfers, and amb c RW          Occupational Profile      Flowsheet Row Most Recent Value   Occupational History/Life Experiences retired professor teaching ethics and mgmt, retired president of [employer]   Performance Patterns enjoys watching [place] sports   Environmental Supports and Barriers 24/7 HHA          VITALS AND PAIN     OT Vitals      Date/Time Pulse SpO2 Pt Activity O2 Therapy BP [facility]   10/24/24 1201 89 97 % At rest None (Room air) 112/63 MB          OT Pain      Date/Time Pain Type Rating: Rest Rating: Activity [facility]   10/24/24 1201 Pain Assessment 0 0 MB             Objective   OBJECTIVE     Start time:  1148  End time:  1206  Session Length: 18 min       General Observations  Patient received supine, in bed. He was agreeable to therapy. Nurse in room    Precautions: fall (+ostomy)       Limitations/Impairments: hearing, safety/cognitive (Nurse and pt report that one hearing aide was lost within the last day.  Did not find during OT session)   Services  Do You Speak a Language Other Than English at Home?: no      OT Eval and Treat - 10/24/24 1259          Cognition    Orientation Status oriented x 3     Affect/Mental Status confused;WFL     Follows Commands WFL;increased processing time needed;verbal cues/prompting required;repetition of directions required     Cognitive Function safety deficit;executive function deficit     Safety Deficit minimal deficit;insight into deficits/self-awareness;judgment     Comment, Cognition A+Ox3 / pleasant and cooperative / motivated with therapies / [place]     Cognitive Interventions/Strategies occupation/activity based interventions        Bed Mobility    Bed Mobility Activities supine to sit     [place] close supervision     Safety/Cues increased time to complete     Assistive Device bed rails;head of bed elevated      Comment txfer twds Lt side with incr time/effort        Mobility Belt    Mobility Belt Used During Session no - medical contraindication     Reason Mobility Belt Not Used ostomy        Sit/Stand Transfer    Surface elevated bed     [place] minimum assist (75% or more patient effort);1 person assist     Safety/Cues increased time to complete;verbal cues;hand placement;initiation;sequencing     Assistive Device walker, standard     Transfer Comments Pt with LOB during initial stand with posterior lean /  Ed on hand/foot placement - [place] from raised EOB        Surface-to-Surface Transfers    Transfer Location bed to chair     Transfer Technique stand step     [place] minimum assist (75% or more patient effort);1 person assist     Safety/Cues increased time to complete;verbal cues;preparatory posture;proper use of assistive device;maintaining center of gravity over base of support     Assistive Device walker, front-wheeled     Transfer Comments Goldie to take steps to chr with + posterior lean        Lower Body Dressing    Tasks don;socks     [place] minimum assist (75% or more patient effort);1 person assist     Safety/Cues increased time to complete;problem-solving     Position supported sitting     Adaptive Equipment none     Comment with incr effort        Toileting    Tasks adjust/manage clothing;perform bladder hygiene     [place] close supervision     Safety/Cues problem-solving     Position edge of bed sitting     Adaptive Equipment urinal     Comment + osotomy        Balance    Static Sitting Balance WFL;sitting, edge of bed     Dynamic Sitting Balance mild impairment;sitting, edge of bed     Sit to Stand Dynamic Balance moderate impairment;supported     Static Standing Balance moderate impairment;supported     Dynamic Standing Balance moderate impairment;supported     Balance Interventions occupation based/functional task     Comment, Balance Goldie + RW due to posterior lean         Impairments/Safety Issues    Impairments Affecting Function balance;endurance/activity tolerance;strength     Functional Endurance fair - limited by poor balance and decr endurance     Safety Issues Affecting Function insight into deficits/self-awareness        Upper Extremity (Therapeutic Exercise)    Comment Ed on HEP to do in sitting for incr endurance                                       Session Outcome  Patient upright, in chair at end of session, chair alarm on, call light in reach, all needs met, personal items in reach. Nursing notified about patient's performance.    AM-PAC™ - ADL (Current Function)     Putting on/taking off regular lower body clothing 3 - A Little   Bathing 2 - A Lot   Toileting 2 - A Lot   Putting on/taking off regular upper body clothing 3 - A Little   Help for taking care of personal grooming 3 - A Little   Eating meals 4 - None   AM-PAC™ ADL Score 17      ASSESSMENT AND PLAN     Progress Summary  Pt is cooperative and motivated with therapies.  Goldie for sup > sit.  LOB with initial stand.  Ed on safe hand and foot placement.  Goldie to take steps twds chr with RW.  Pt with + posterior lean.  Able to slightly adjust with v/tactile cues.  Goldie for drsg and toileting tasks.  Rec cont therapy intervention to Max ADL Ind, incr balance and overall activity tolerance.    Patient/Family Therapy Goal Statement: get stronger, go home    OT Plan      Flowsheet Row Most Recent Value   Rehab Potential good, to achieve stated therapy goals at 10/24/2024 1259   Therapy Frequency 4 times/wk at 10/24/2024 1259   Planned Therapy Interventions activity tolerance training, BADL retraining, patient/caregiver education/training, ROM/therapeutic exercise, transfer/mobility retraining, strengthening exercise, occupation/activity based interventions, functional balance retraining at 10/24/2024 1259            OT Discharge Recommendations      Flowsheet Row Most Recent Value   OT Recommended Discharge  Disposition home with home health, home with assistance  [pt has 24/7 HHA] at 10/24/2024 1259   Anticipated Equipment Needs if Discharged Home (OT) none  [has all needed] at 10/24/2024 1259                 OT Goals      Flowsheet Row Most Recent Value   Bed Mobility Goal 1    Activity/Assistive Device bed mobility activities, all at 10/17/2024 1024   [place] modified independence at 10/17/2024 1024   Time Frame by discharge at 10/17/2024 1024   Progress/Outcome goal ongoing at 10/17/2024 1024   Transfer Goal 1    Activity/Assistive Device all transfers at 10/17/2024 1024   [place] supervision required at 10/17/2024 1024   Time Frame by discharge at 10/17/2024 1024   Progress/Outcome goal ongoing at 10/17/2024 1024   Dressing Goal 1    Activity/Adaptive Equipment dressing skills, all at 10/17/2024 1024   [place] minimum assist (75% or more patient effort) at 10/17/2024 1024   Time Frame by discharge at 10/17/2024 1024   Progress/Outcome goal ongoing at 10/17/2024 1024   Toileting Goal 1    Activity/Assistive Device toileting skills, all at 10/17/2024 1024   [place] minimum assist (75% or more patient effort) at 10/17/2024 1024   Time Frame by discharge at 10/17/2024 1024   Progress/Outcome goal ongoing at 10/17/2024 1024

## 2024-10-24 NOTE — PLAN OF CARE
Plan of Care Review  Plan of Care Reviewed With: patient  Progress: improving  Outcome Evaluation: pt aao4, no complaints of pain, fsp in place, colonostomy bag changed, no s/s of active bleeding overnight, contact preaction maintained, all fsp in place, call bell in reach. All pt needs met at this time.

## 2024-11-05 NOTE — OP NOTE
_______________________________________________________________________________  Patient Name: Samy Elena          Procedure Date: 10/23/2024 8:07 AM  MRN: [id number]                     Account Number: [id number]  YOB: 1930              Age: 89  Gender: Male                          Note Status: Finalized  Attending MD: MAGGIE SIMS MD~BETHANY,  _______________________________________________________________________________  Procedure:             Flexible Sigmoidoscopy  Indications:           Hematochezia  Providers:             MAGGIE SISM MD~BETHANY (Doctor)  Referring MD:          ARTURO ORTIZ MD  Requesting Provider:  Medicines:             See the Anesthesia note for documentation of the  administered medications  Complications:         No immediate complications.  _______________________________________________________________________________  Procedure:             After obtaining informed consent, the endoscope was  passed under direct vision. Throughout the procedure,  the patient's blood pressure, pulse, and oxygen  saturations were monitored continuously. The  colonoscope was introduced through the anus and  advanced to the sigmoid colon. The flexible  sigmoidoscopy was accomplished without difficulty. The  patient tolerated the procedure well.  Findings:  Red blood with many clots were found in the rectum, and sigmoid.  The perianal and digital rectal examinations were normal.  No additional abnormalities were found on retroflexion.  Large-mouthed and small-mouthed diverticula were found in the rectum,  recto-sigmoid colon and sigmoid colon.  Impression:            Possible source of rectal bleeding is 2/2 diverticular  in nature, however, cannot rule out diversion  proctitis iso large clot burden preventing from  completing procedure due to perforation risk.  - Blood and clots in the rectum and sigmoid colon.  - Diverticulosis in the rectum, in the  recto-sigmoid  colon and in the sigmoid colon.  - No specimens collected.  Recommendation:        - Continue present medications.  - Avoid Anticoagulation  - If large volume rectal bleeding, obtain STAT CTA and  discuss with IR regarding embolization  - Return patient to [facility] for ongoing care.  Procedure Code(s):     --- Professional ---  55405, Sigmoidoscopy, flexible; diagnostic, including  collection of specimen(s) by brushing or washing, when  performed (separate procedure)  Diagnosis Code(s):     --- Professional ---  K62.5, Hemorrhage of anus and rectum  K92.1, Melena (includes Hematochezia)  K57.30, Diverticulosis of large intestine without  perforation or abscess without bleeding  CPT copyright 2023 American Medical Association. All rights reserved.  The codes documented in this report are preliminary and upon  review may  be revised to meet current compliance requirements.  Attending Participation:  I was present and participated during the entire procedure, including  non-escobar portions.  David Holcomb MD  ______________________________  DAVID HOLCOMB MD~BETHANY  11/5/2024 8:37:01 AM  This report has been signed electronically.  Number of Addenda: 0  Note Initiated On: 10/23/2024 8:07 AM

## 2024-11-26 ENCOUNTER — APPOINTMENT (EMERGENCY)
Dept: RADIOLOGY | Facility: HOSPITAL | Age: 89
DRG: 378 | End: 2024-11-26
Payer: MEDICARE

## 2024-11-26 ENCOUNTER — TELEPHONE (OUTPATIENT)
Dept: SURGERY | Facility: CLINIC | Age: 89
End: 2024-11-26
Payer: MEDICARE

## 2024-11-26 ENCOUNTER — HOSPITAL ENCOUNTER (INPATIENT)
Facility: HOSPITAL | Age: 89
LOS: 1 days | Discharge: HOME | DRG: 378 | End: 2024-11-28
Attending: EMERGENCY MEDICINE
Payer: MEDICARE

## 2024-11-26 DIAGNOSIS — I72.3 ANEURYSM OF RIGHT ILIAC ARTERY (CMS/HCC): ICD-10-CM

## 2024-11-26 DIAGNOSIS — R94.31 QT PROLONGATION: ICD-10-CM

## 2024-11-26 DIAGNOSIS — K92.2 GASTROINTESTINAL HEMORRHAGE, UNSPECIFIED GASTROINTESTINAL HEMORRHAGE TYPE: Primary | ICD-10-CM

## 2024-11-26 LAB
ABO + RH BLD: NORMAL
ALBUMIN SERPL-MCNC: 2.9 G/DL (ref 3.5–5.7)
ALP SERPL-CCNC: 97 IU/L (ref 34–125)
ALT SERPL-CCNC: 5 IU/L (ref 7–52)
ANION GAP SERPL CALC-SCNC: 8 MEQ/L (ref 3–15)
AST SERPL-CCNC: 28 IU/L (ref 13–39)
BASOPHILS # BLD: 0.04 K/UL (ref 0.01–0.1)
BASOPHILS NFR BLD: 0.8 %
BILIRUB SERPL-MCNC: 0.6 MG/DL (ref 0.3–1.2)
BLD GP AB SCN SERPL QL: NEGATIVE
BUN SERPL-MCNC: 26 MG/DL (ref 7–25)
CALCIUM SERPL-MCNC: 8.4 MG/DL (ref 8.6–10.3)
CHLORIDE SERPL-SCNC: 108 MEQ/L (ref 98–107)
CO2 SERPL-SCNC: 24 MEQ/L (ref 21–31)
CREAT SERPL-MCNC: 1.6 MG/DL (ref 0.7–1.3)
D AG BLD QL: POSITIVE
DIFFERENTIAL METHOD BLD: ABNORMAL
EGFRCR SERPLBLD CKD-EPI 2021: 39.9 ML/MIN/1.73M*2
EOSINOPHIL # BLD: 0.12 K/UL (ref 0.04–0.54)
EOSINOPHIL NFR BLD: 2.5 %
ERYTHROCYTE [DISTWIDTH] IN BLOOD BY AUTOMATED COUNT: 19.2 % (ref 11.6–14.4)
GLUCOSE SERPL-MCNC: 135 MG/DL (ref 70–99)
HCT VFR BLD AUTO: 26.1 % (ref 40.1–51)
HGB BLD-MCNC: 8.1 G/DL (ref 13.7–17.5)
IMM GRANULOCYTES # BLD AUTO: 0.02 K/UL (ref 0–0.08)
IMM GRANULOCYTES NFR BLD AUTO: 0.4 %
ISBT CODE: 9500
LABORATORY COMMENT REPORT: NORMAL
LACTATE SERPL-SCNC: 2.1 MMOL/L (ref 0.4–2)
LYMPHOCYTES # BLD: 0.55 K/UL (ref 1.2–3.5)
LYMPHOCYTES NFR BLD: 11.5 %
MCH RBC QN AUTO: 32.8 PG (ref 28–33.2)
MCHC RBC AUTO-ENTMCNC: 31 G/DL (ref 32.2–36.5)
MCV RBC AUTO: 105.7 FL (ref 83–98)
MONOCYTES # BLD: 0.54 K/UL (ref 0.3–1)
MONOCYTES NFR BLD: 11.3 %
NEUTROPHILS # BLD: 3.52 K/UL (ref 1.7–7)
NEUTS SEG NFR BLD: 73.5 %
NRBC BLD-RTO: 0 %
PLATELET # BLD AUTO: 103 K/UL (ref 150–350)
PMV BLD AUTO: 10.4 FL (ref 9.4–12.4)
POTASSIUM SERPL-SCNC: 4.1 MEQ/L (ref 3.5–5.1)
PRODUCT CODE: NORMAL
PRODUCT STATUS: NORMAL
PROT SERPL-MCNC: 7.4 G/DL (ref 6–8.2)
RBC # BLD AUTO: 2.47 M/UL (ref 4.5–5.8)
SODIUM SERPL-SCNC: 140 MEQ/L (ref 136–145)
SPECIMEN EXP DATE BLD: NORMAL
SPECIMEN EXP DATE BLD: NORMAL
UNIT ABO: NORMAL
UNIT ID: NORMAL
UNIT RH: NEGATIVE
WBC # BLD AUTO: 4.79 K/UL (ref 3.8–10.5)

## 2024-11-26 PROCEDURE — 74174 CTA ABD&PLVS W/CONTRAST: CPT

## 2024-11-26 PROCEDURE — 85025 COMPLETE CBC W/AUTO DIFF WBC: CPT | Performed by: EMERGENCY MEDICINE

## 2024-11-26 PROCEDURE — 96374 THER/PROPH/DIAG INJ IV PUSH: CPT | Mod: 59

## 2024-11-26 PROCEDURE — 86850 RBC ANTIBODY SCREEN: CPT

## 2024-11-26 PROCEDURE — 36415 COLL VENOUS BLD VENIPUNCTURE: CPT

## 2024-11-26 PROCEDURE — 85025 COMPLETE CBC W/AUTO DIFF WBC: CPT

## 2024-11-26 PROCEDURE — 63600105 HC IODINE BASED CONTRAST

## 2024-11-26 PROCEDURE — 80053 COMPREHEN METABOLIC PANEL: CPT | Performed by: EMERGENCY MEDICINE

## 2024-11-26 PROCEDURE — 93005 ELECTROCARDIOGRAM TRACING: CPT

## 2024-11-26 PROCEDURE — 86923 COMPATIBILITY TEST ELECTRIC: CPT

## 2024-11-26 PROCEDURE — 83605 ASSAY OF LACTIC ACID: CPT

## 2024-11-26 PROCEDURE — 63600000 HC DRUGS/DETAIL CODE: Mod: JZ

## 2024-11-26 PROCEDURE — 99285 EMERGENCY DEPT VISIT HI MDM: CPT | Mod: 25

## 2024-11-26 PROCEDURE — P9016 RBC LEUKOCYTES REDUCED: HCPCS

## 2024-11-26 RX ORDER — IOPAMIDOL 755 MG/ML
100 INJECTION, SOLUTION INTRAVASCULAR
Status: COMPLETED | OUTPATIENT
Start: 2024-11-26 | End: 2024-11-26

## 2024-11-26 RX ORDER — SODIUM CHLORIDE 9 MG/ML
5 INJECTION, SOLUTION INTRAVENOUS AS NEEDED
Status: ACTIVE | OUTPATIENT
Start: 2024-11-26 | End: 2024-11-27

## 2024-11-26 RX ORDER — PANTOPRAZOLE SODIUM 40 MG/10ML
40 INJECTION, POWDER, LYOPHILIZED, FOR SOLUTION INTRAVENOUS ONCE
Status: COMPLETED | OUTPATIENT
Start: 2024-11-26 | End: 2024-11-26

## 2024-11-26 RX ADMIN — IOPAMIDOL 80 ML: 755 INJECTION, SOLUTION INTRAVENOUS at 23:04

## 2024-11-26 RX ADMIN — PANTOPRAZOLE SODIUM 40 MG: 40 INJECTION, POWDER, LYOPHILIZED, FOR SOLUTION INTRAVENOUS at 22:21

## 2024-11-26 RX ADMIN — IOPAMIDOL 80 ML: 755 INJECTION, SOLUTION INTRAVENOUS at 23:03

## 2024-11-26 ASSESSMENT — ENCOUNTER SYMPTOMS
BACK PAIN: 0
SINUS PRESSURE: 0
SHORTNESS OF BREATH: 0
WEAKNESS: 0
DIZZINESS: 0
CHILLS: 0
BLOOD IN STOOL: 1
HEADACHES: 0
COUGH: 0
HEMATURIA: 0
FATIGUE: 0
DYSURIA: 0
LIGHT-HEADEDNESS: 0
NUMBNESS: 0
FREQUENCY: 0
DIAPHORESIS: 0
RHINORRHEA: 0
FLANK PAIN: 0
ANAL BLEEDING: 1
ABDOMINAL PAIN: 0
VOMITING: 0
NERVOUS/ANXIOUS: 0
SORE THROAT: 0
DIARRHEA: 0
NAUSEA: 0
FEVER: 0

## 2024-11-26 NOTE — TELEPHONE ENCOUNTER
Letitia from [facility], has some questions and concerns. Stated patient has some bloody stools and ostomy pouch looks bloody.  Letitia call back number is [phone]

## 2024-11-26 NOTE — TELEPHONE ENCOUNTER
Phone call to Home health.  She states stool appears bloody--red mixed with black.  No issues with ostomy site  or output. If patient has bloody stools, recommend he go to the ED given his history of recurrent GI bleed.  PCP and GI should be notified regarding concerns for GI bleed. She will recommend ED to patient.

## 2024-11-27 ENCOUNTER — ANESTHESIA EVENT (INPATIENT)
Dept: ENDOSCOPY | Facility: HOSPITAL | Age: 89
DRG: 378 | End: 2024-11-27
Payer: MEDICARE

## 2024-11-27 ENCOUNTER — ANESTHESIA (INPATIENT)
Dept: ENDOSCOPY | Facility: HOSPITAL | Age: 89
DRG: 378 | End: 2024-11-27
Payer: MEDICARE

## 2024-11-27 LAB
ANION GAP SERPL CALC-SCNC: 8 MEQ/L (ref 3–15)
BUN SERPL-MCNC: 27 MG/DL (ref 7–25)
CALCIUM SERPL-MCNC: 8.2 MG/DL (ref 8.6–10.3)
CHLORIDE SERPL-SCNC: 109 MEQ/L (ref 98–107)
CO2 SERPL-SCNC: 22 MEQ/L (ref 21–31)
CREAT SERPL-MCNC: 1.5 MG/DL (ref 0.7–1.3)
EGFRCR SERPLBLD CKD-EPI 2021: 43.1 ML/MIN/1.73M*2
ERYTHROCYTE [DISTWIDTH] IN BLOOD BY AUTOMATED COUNT: 19.8 % (ref 11.6–14.4)
ERYTHROCYTE [DISTWIDTH] IN BLOOD BY AUTOMATED COUNT: 21.2 % (ref 11.6–14.4)
ERYTHROCYTE [DISTWIDTH] IN BLOOD BY AUTOMATED COUNT: 21.2 % (ref 11.6–14.4)
GLUCOSE SERPL-MCNC: 111 MG/DL (ref 70–99)
HCT VFR BLD AUTO: 24.5 % (ref 40.1–51)
HCT VFR BLD AUTO: 27.4 % (ref 40.1–51)
HCT VFR BLD AUTO: 28 % (ref 40.1–51)
HGB BLD-MCNC: 7.9 G/DL (ref 13.7–17.5)
HGB BLD-MCNC: 8.7 G/DL (ref 13.7–17.5)
HGB BLD-MCNC: 8.9 G/DL (ref 13.7–17.5)
LACTATE SERPL-SCNC: 1.9 MMOL/L (ref 0.4–2)
MAGNESIUM SERPL-MCNC: 1.9 MG/DL (ref 1.8–2.5)
MCH RBC QN AUTO: 31.9 PG (ref 28–33.2)
MCH RBC QN AUTO: 32.1 PG (ref 28–33.2)
MCH RBC QN AUTO: 32.9 PG (ref 28–33.2)
MCHC RBC AUTO-ENTMCNC: 31.8 G/DL (ref 32.2–36.5)
MCHC RBC AUTO-ENTMCNC: 31.8 G/DL (ref 32.2–36.5)
MCHC RBC AUTO-ENTMCNC: 32.2 G/DL (ref 32.2–36.5)
MCV RBC AUTO: 100.4 FL (ref 83–98)
MCV RBC AUTO: 101.1 FL (ref 83–98)
MCV RBC AUTO: 102.1 FL (ref 83–98)
PLATELET # BLD AUTO: 82 K/UL (ref 150–350)
PLATELET # BLD AUTO: 85 K/UL (ref 150–350)
PLATELET # BLD AUTO: 89 K/UL (ref 150–350)
PMV BLD AUTO: 10.2 FL (ref 9.4–12.4)
PMV BLD AUTO: 10.4 FL (ref 9.4–12.4)
PMV BLD AUTO: 10.9 FL (ref 9.4–12.4)
POTASSIUM SERPL-SCNC: 4.2 MEQ/L (ref 3.5–5.1)
QRS DURATION: 170
QT INTERVAL: 550
QTC CALCULATION(BAZETT): 539
R AXIS: 66
RBC # BLD AUTO: 2.4 M/UL (ref 4.5–5.8)
RBC # BLD AUTO: 2.73 M/UL (ref 4.5–5.8)
RBC # BLD AUTO: 2.77 M/UL (ref 4.5–5.8)
SODIUM SERPL-SCNC: 139 MEQ/L (ref 136–145)
T WAVE AXIS: -13
VENTRICULAR RATE: 58
WBC # BLD AUTO: 4.19 K/UL (ref 3.8–10.5)
WBC # BLD AUTO: 4.33 K/UL (ref 3.8–10.5)
WBC # BLD AUTO: 4.37 K/UL (ref 3.8–10.5)

## 2024-11-27 PROCEDURE — 83735 ASSAY OF MAGNESIUM: CPT

## 2024-11-27 PROCEDURE — 71000001 HC PACU PHASE 1 INITIAL 30MIN: Performed by: STUDENT IN AN ORGANIZED HEALTH CARE EDUCATION/TRAINING PROGRAM

## 2024-11-27 PROCEDURE — 200200 PR NO CHARGE: Performed by: HOSPITALIST

## 2024-11-27 PROCEDURE — 36415 COLL VENOUS BLD VENIPUNCTURE: CPT

## 2024-11-27 PROCEDURE — 25000000 HC PHARMACY GENERAL: Performed by: NURSE ANESTHETIST, CERTIFIED REGISTERED

## 2024-11-27 PROCEDURE — 75000135 HC SIGMOIDOSCOPY W WO BRUSH: Performed by: STUDENT IN AN ORGANIZED HEALTH CARE EDUCATION/TRAINING PROGRAM

## 2024-11-27 PROCEDURE — 93010 ELECTROCARDIOGRAM REPORT: CPT | Performed by: INTERNAL MEDICINE

## 2024-11-27 PROCEDURE — 0DJ08ZZ INSPECTION OF UPPER INTESTINAL TRACT, VIA NATURAL OR ARTIFICIAL OPENING ENDOSCOPIC: ICD-10-PCS | Performed by: STUDENT IN AN ORGANIZED HEALTH CARE EDUCATION/TRAINING PROGRAM

## 2024-11-27 PROCEDURE — 0DJD8ZZ INSPECTION OF LOWER INTESTINAL TRACT, VIA NATURAL OR ARTIFICIAL OPENING ENDOSCOPIC: ICD-10-PCS | Performed by: STUDENT IN AN ORGANIZED HEALTH CARE EDUCATION/TRAINING PROGRAM

## 2024-11-27 PROCEDURE — 63700000 HC SELF-ADMINISTRABLE DRUG

## 2024-11-27 PROCEDURE — 63600000 HC DRUGS/DETAIL CODE: Mod: JZ | Performed by: ANESTHESIOLOGY

## 2024-11-27 PROCEDURE — 71000011 HC PACU PHASE 1 EA ADDL MIN: Performed by: STUDENT IN AN ORGANIZED HEALTH CARE EDUCATION/TRAINING PROGRAM

## 2024-11-27 PROCEDURE — 63600000 HC DRUGS/DETAIL CODE: Mod: JW | Performed by: NURSE ANESTHETIST, CERTIFIED REGISTERED

## 2024-11-27 PROCEDURE — 80048 BASIC METABOLIC PNL TOTAL CA: CPT

## 2024-11-27 PROCEDURE — 99223 1ST HOSP IP/OBS HIGH 75: CPT

## 2024-11-27 PROCEDURE — 85027 COMPLETE CBC AUTOMATED: CPT | Performed by: NURSE PRACTITIONER

## 2024-11-27 PROCEDURE — 75000081 HC EGD W WO BRUSH WASH: Performed by: STUDENT IN AN ORGANIZED HEALTH CARE EDUCATION/TRAINING PROGRAM

## 2024-11-27 PROCEDURE — 21400000 HC ROOM AND CARE CCU/INTERMEDIATE

## 2024-11-27 PROCEDURE — 37000002 HC ANESTHESIA MAC: Performed by: STUDENT IN AN ORGANIZED HEALTH CARE EDUCATION/TRAINING PROGRAM

## 2024-11-27 PROCEDURE — 63600000 HC DRUGS/DETAIL CODE: Mod: JZ

## 2024-11-27 PROCEDURE — 25800000 HC PHARMACY IV SOLUTIONS: Performed by: NURSE ANESTHETIST, CERTIFIED REGISTERED

## 2024-11-27 PROCEDURE — P9016 RBC LEUKOCYTES REDUCED: HCPCS

## 2024-11-27 PROCEDURE — 83605 ASSAY OF LACTIC ACID: CPT

## 2024-11-27 PROCEDURE — 85027 COMPLETE CBC AUTOMATED: CPT

## 2024-11-27 RX ORDER — LATANOPROST 50 UG/ML
1 SOLUTION/ DROPS OPHTHALMIC NIGHTLY
Status: DISCONTINUED | OUTPATIENT
Start: 2024-11-27 | End: 2024-11-28 | Stop reason: HOSPADM

## 2024-11-27 RX ORDER — CLOPIDOGREL BISULFATE 75 MG/1
75 TABLET ORAL DAILY
COMMUNITY
End: 2024-11-27 | Stop reason: ALTCHOICE

## 2024-11-27 RX ORDER — DOBUTAMINE HYDROCHLORIDE 200 MG/100ML
2.5 INJECTION INTRAVENOUS CONTINUOUS
Status: DISCONTINUED | OUTPATIENT
Start: 2024-11-27 | End: 2024-11-28 | Stop reason: HOSPADM

## 2024-11-27 RX ORDER — FERROUS SULFATE 300 MG/5ML
300 LIQUID (ML) ORAL DAILY
Status: DISCONTINUED | OUTPATIENT
Start: 2024-11-27 | End: 2024-11-28 | Stop reason: HOSPADM

## 2024-11-27 RX ORDER — MELATONIN 10 MG
10 CAPSULE ORAL NIGHTLY
COMMUNITY

## 2024-11-27 RX ORDER — ESCITALOPRAM OXALATE 5 MG/1
5 TABLET ORAL EVERY MORNING
Status: DISCONTINUED | OUTPATIENT
Start: 2024-11-27 | End: 2024-11-28 | Stop reason: HOSPADM

## 2024-11-27 RX ORDER — THIAMINE HCL 100 MG
100 TABLET ORAL DAILY
Status: DISCONTINUED | OUTPATIENT
Start: 2024-11-27 | End: 2024-11-28 | Stop reason: HOSPADM

## 2024-11-27 RX ORDER — LANOLIN ALCOHOL/MO/W.PET/CERES
100 CREAM (GRAM) TOPICAL EVERY MORNING
COMMUNITY

## 2024-11-27 RX ORDER — CALCIUM CARBONATE 200(500)MG
1 TABLET,CHEWABLE ORAL DAILY
COMMUNITY

## 2024-11-27 RX ORDER — DEXTROSE 50 % IN WATER (D50W) INTRAVENOUS SYRINGE
25 AS NEEDED
Status: DISCONTINUED | OUTPATIENT
Start: 2024-11-27 | End: 2024-11-28 | Stop reason: HOSPADM

## 2024-11-27 RX ORDER — POTASSIUM CHLORIDE 1500 MG/1
20 TABLET, EXTENDED RELEASE ORAL 2 TIMES DAILY
COMMUNITY
End: 2024-11-27 | Stop reason: SDUPTHER

## 2024-11-27 RX ORDER — PNV NO.95/FERROUS FUM/FOLIC AC 28MG-0.8MG
500 TABLET ORAL EVERY MORNING
Status: DISCONTINUED | OUTPATIENT
Start: 2024-11-27 | End: 2024-11-28 | Stop reason: HOSPADM

## 2024-11-27 RX ORDER — MIDAZOLAM HYDROCHLORIDE 2 MG/2ML
INJECTION, SOLUTION INTRAMUSCULAR; INTRAVENOUS AS NEEDED
Status: DISCONTINUED | OUTPATIENT
Start: 2024-11-27 | End: 2024-11-27 | Stop reason: SURG

## 2024-11-27 RX ORDER — FINASTERIDE 5 MG/1
5 TABLET, FILM COATED ORAL DAILY
Status: DISCONTINUED | OUTPATIENT
Start: 2024-11-27 | End: 2024-11-28 | Stop reason: HOSPADM

## 2024-11-27 RX ORDER — PANTOPRAZOLE SODIUM 40 MG/10ML
40 INJECTION, POWDER, LYOPHILIZED, FOR SOLUTION INTRAVENOUS EVERY 12 HOURS
Status: DISCONTINUED | OUTPATIENT
Start: 2024-11-27 | End: 2024-11-28 | Stop reason: HOSPADM

## 2024-11-27 RX ORDER — IBUPROFEN 200 MG
16-32 TABLET ORAL AS NEEDED
Status: DISCONTINUED | OUTPATIENT
Start: 2024-11-27 | End: 2024-11-28 | Stop reason: HOSPADM

## 2024-11-27 RX ORDER — LEVOTHYROXINE SODIUM 100 UG/1
100 TABLET ORAL
Status: DISCONTINUED | OUTPATIENT
Start: 2024-11-27 | End: 2024-11-28 | Stop reason: HOSPADM

## 2024-11-27 RX ORDER — POTASSIUM CHLORIDE 20 MEQ/1
10 TABLET, EXTENDED RELEASE ORAL 2 TIMES DAILY
COMMUNITY

## 2024-11-27 RX ORDER — CARBOXYMETHYLCELLULOSE SODIUM 5 MG/ML
1 SOLUTION/ DROPS OPHTHALMIC 4 TIMES DAILY PRN
Status: DISCONTINUED | OUTPATIENT
Start: 2024-11-27 | End: 2024-11-28 | Stop reason: HOSPADM

## 2024-11-27 RX ORDER — SODIUM CHLORIDE 9 MG/ML
INJECTION, SOLUTION INTRAVENOUS CONTINUOUS PRN
Status: DISCONTINUED | OUTPATIENT
Start: 2024-11-27 | End: 2024-11-27 | Stop reason: SURG

## 2024-11-27 RX ORDER — PROPOFOL 200MG/20ML
SYRINGE (ML) INTRAVENOUS AS NEEDED
Status: DISCONTINUED | OUTPATIENT
Start: 2024-11-27 | End: 2024-11-27 | Stop reason: SURG

## 2024-11-27 RX ORDER — EPHEDRINE SULFATE 50 MG/ML
INJECTION, SOLUTION INTRAVENOUS AS NEEDED
Status: DISCONTINUED | OUTPATIENT
Start: 2024-11-27 | End: 2024-11-27 | Stop reason: SURG

## 2024-11-27 RX ORDER — LIDOCAINE HYDROCHLORIDE 40 MG/ML
SOLUTION TOPICAL AS NEEDED
Status: DISCONTINUED | OUTPATIENT
Start: 2024-11-27 | End: 2024-11-27 | Stop reason: SURG

## 2024-11-27 RX ORDER — SODIUM CHLORIDE 9 MG/ML
5 INJECTION, SOLUTION INTRAVENOUS AS NEEDED
Status: ACTIVE | OUTPATIENT
Start: 2024-11-27 | End: 2024-11-28

## 2024-11-27 RX ORDER — DEXTROSE 40 %
15-30 GEL (GRAM) ORAL AS NEEDED
Status: DISCONTINUED | OUTPATIENT
Start: 2024-11-27 | End: 2024-11-28 | Stop reason: HOSPADM

## 2024-11-27 RX ORDER — OMEPRAZOLE 20 MG/1
20 CAPSULE, DELAYED RELEASE ORAL DAILY
COMMUNITY

## 2024-11-27 RX ORDER — ASPIRIN 81 MG/1
81 TABLET ORAL DAILY
COMMUNITY

## 2024-11-27 RX ORDER — FERROUS SULFATE 325(65) MG
65 TABLET ORAL
COMMUNITY

## 2024-11-27 RX ORDER — TAMSULOSIN HYDROCHLORIDE 0.4 MG/1
0.4 CAPSULE ORAL DAILY
Status: DISCONTINUED | OUTPATIENT
Start: 2024-11-27 | End: 2024-11-28 | Stop reason: HOSPADM

## 2024-11-27 RX ADMIN — PROPOFOL 10 MG: 10 INJECTION, EMULSION INTRAVENOUS at 13:05

## 2024-11-27 RX ADMIN — MIDAZOLAM HYDROCHLORIDE 0.5 MG: 1 INJECTION, SOLUTION INTRAMUSCULAR; INTRAVENOUS at 12:43

## 2024-11-27 RX ADMIN — FERROUS SULFATE 300 MG: 300 SOLUTION ORAL at 10:28

## 2024-11-27 RX ADMIN — LEVOTHYROXINE SODIUM 100 MCG: 0.1 TABLET ORAL at 05:57

## 2024-11-27 RX ADMIN — EPHEDRINE SULFATE 5 MG: 50 INJECTION, SOLUTION INTRAVENOUS at 12:45

## 2024-11-27 RX ADMIN — DOBUTAMINE HYDROCHLORIDE 2.5 MCG/KG/MIN: 200 INJECTION INTRAVENOUS at 12:39

## 2024-11-27 RX ADMIN — PROPOFOL 10 MG: 10 INJECTION, EMULSION INTRAVENOUS at 12:45

## 2024-11-27 RX ADMIN — FINASTERIDE 5 MG: 5 TABLET, FILM COATED ORAL at 10:28

## 2024-11-27 RX ADMIN — PANTOPRAZOLE SODIUM 40 MG: 40 INJECTION, POWDER, FOR SOLUTION INTRAVENOUS at 10:28

## 2024-11-27 RX ADMIN — LATANOPROST 1 DROP: 50 SOLUTION/ DROPS OPHTHALMIC at 20:18

## 2024-11-27 RX ADMIN — SODIUM CHLORIDE: 9 INJECTION, SOLUTION INTRAVENOUS at 12:37

## 2024-11-27 RX ADMIN — PANTOPRAZOLE SODIUM 40 MG: 40 INJECTION, POWDER, FOR SOLUTION INTRAVENOUS at 20:17

## 2024-11-27 RX ADMIN — ESCITALOPRAM OXALATE 5 MG: 5 TABLET, FILM COATED ORAL at 10:28

## 2024-11-27 RX ADMIN — LIDOCAINE HYDROCHLORIDE 3 ML: 40 SOLUTION TOPICAL at 12:36

## 2024-11-27 RX ADMIN — Medication 500 MCG: at 10:28

## 2024-11-27 RX ADMIN — THIAMINE HCL TAB 100 MG 100 MG: 100 TAB at 10:28

## 2024-11-27 RX ADMIN — EPHEDRINE SULFATE 5 MG: 50 INJECTION, SOLUTION INTRAVENOUS at 13:05

## 2024-11-27 RX ADMIN — MIDAZOLAM HYDROCHLORIDE 0.5 MG: 1 INJECTION, SOLUTION INTRAMUSCULAR; INTRAVENOUS at 12:37

## 2024-11-27 RX ADMIN — TAMSULOSIN HYDROCHLORIDE 0.4 MG: 0.4 CAPSULE ORAL at 10:28

## 2024-11-27 RX ADMIN — PROPOFOL 15 MG: 10 INJECTION, EMULSION INTRAVENOUS at 12:49

## 2024-11-27 ASSESSMENT — COGNITIVE AND FUNCTIONAL STATUS - GENERAL
EATING MEALS: 3 - A LITTLE
CLIMB 3 TO 5 STEPS WITH RAILING: 1 - TOTAL
HELP NEEDED FOR PERSONAL GROOMING: 3 - A LITTLE
DRESSING REGULAR UPPER BODY CLOTHING: 3 - A LITTLE
WALKING IN HOSPITAL ROOM: 1 - TOTAL
DRESSING REGULAR LOWER BODY CLOTHING: 1 - TOTAL
HELP NEEDED FOR BATHING: 2 - A LOT
TOILETING: 3 - A LITTLE
STANDING UP FROM CHAIR USING ARMS: 1 - TOTAL
MOVING TO AND FROM BED TO CHAIR: 1 - TOTAL

## 2024-11-27 ASSESSMENT — ENCOUNTER SYMPTOMS
DYSRHYTHMIAS: 1
SHORTNESS OF BREATH: 1

## 2024-11-27 NOTE — ASSESSMENT & PLAN NOTE
No urinary symptoms currently.  Hx of ESBL - contact precautions.  Holding home suppressive antibiotic due to CALI, resume when able

## 2024-11-27 NOTE — ED PROVIDER NOTES
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  Son states he also noticed that there was florencio blood in the bag.  Son, who is patient's POA noticed that he seemed more pale today and his hands looked blue.  He is on aspirin.  Patient has shortness of breath, abdominal pain or chest pain.  No rectal bleeding.    Patient had recent admission 10/15 to 10/24/2024 for GI bleed.  Per chart review, has a history of recurrent diverticular and hemorrhoidal bleeds.  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)     Coronary artery disease     Disease of thyroid gland     Will catheter in place     6/25 not at present    GI (gastrointestinal bleed)     Hypertension     Myocardial infarction (CMS/HCC)     Orthostatic hypotension     TIA (transient ischemic attack)        Past Surgical History   Procedure Laterality Date    cataract extraction right eye with implant and limbal relaxing incision Right 7/18/2024    Performed by Hayder Moses MD at  OR [facility]    Cataract extraction w/ intraocular lens implant Left     Cataract extraction with LRI and anterior vitrectomy left  eye Left 11/16/2023    Performed by Hayder Moses MD at  OR [facility]    Cholecystectomy      Colostomy      COLOSTOMY LAPAROSCOPIC N/A 8/3/2022    Performed by Mat Bryant MD at [facility] OR    Coronary artery bypass graft      DIAGNOSTIC FLEXIBLE SIGMOIDOSCOPY WITH COLLECTION OF SPECIMEN BY WASHING N/A 10/18/2024    Performed by Maine Lopez MD at [facility] GI    FLEXIBLE SIGMOIDOSCOPY WITH BIOPSY N/A 10/23/2024    Performed by David Holcomb MD at [facility] GI    INCISION AND DRAINAGE WITH DEBRIDMENT OF BUTTOCK, AND PERINEUM N/A 8/2/2022    Performed by Mat Bryant MD at [facility] OR    Joint replacement Left     Knee    RIGHT HEART CATH N/A 8/4/2022    Performed by Cristal Lacey MD at [facility] CARDIAC CATH/EP    Thyroidectomy      WASHOUT OF PERINEAL WOUND, COLONIC LAVAGE N/A 8/3/2022    Performed by Mat Bryant MD at [facility] OR       No family history on file.    Social History     Tobacco Use    Smoking status: Never    Smokeless tobacco: Never   Vaping Use    Vaping status: Never Used   Substance Use Topics    Alcohol use: Not Currently     Comment: social    Drug use: Never         Review of Systems   REVIEW OF SYSTEMS     Review of Systems   Constitutional:  Negative for chills, diaphoresis, fatigue and fever.   HENT:  Negative for congestion, rhinorrhea, sinus pressure and sore throat.    Eyes:  Negative for visual disturbance.   Respiratory:  Negative for cough and shortness of breath.    Cardiovascular:  Negative for chest pain and leg swelling.   Gastrointestinal:  Positive for anal bleeding and blood in stool. Negative for abdominal pain, diarrhea, nausea and vomiting.   Genitourinary:  Negative for dysuria, flank pain, frequency and hematuria.   Musculoskeletal:  Negative for back pain.   Skin:  Negative for rash.   Neurological:  Negative for dizziness, weakness, light-headedness, numbness and headaches.   Psychiatric/Behavioral:  The patient is not nervous/anxious.          VITALS     ED Vitals       Date/Time Temp Pulse Resp BP SpO2 [facility]   11/27/24 0945 -- -- -- 99/63 -- KNR   11/27/24 0925 36.4 °C (97.6 °F) 67 20 118/70 -- KNR   11/27/24 0900 36.4 °C (97.6 °F) 65 20 118/72 98 % KNR   11/27/24 0741 36.6 °C (97.9 °F) 66 16 101/57 97 % DD   11/27/24 0725 36.6 °C (97.9 °F) 62 18 116/59 100 % AC   11/27/24 0700 -- 65 20 104/68 98 % AC   11/27/24 0601 -- 58 20 99/77 98 % AC   11/27/24 0500 -- 57 20 104/53 98 % AC   11/27/24 0401 -- 63 18 94/55 96 % AC   11/27/24 0301 -- 65 18 125/71 97 % AC   11/27/24 0200 -- 60 22 101/55 98 % AC   11/27/24 0136 36.7 °C (98 °F) -- -- -- -- AC   11/27/24 0133 -- 57 20 109/54 96 % AC   11/27/24 0100 -- 60 20 105/58 98 % AC   11/27/24 0030 -- 62 20 119/59 97 % AC   11/27/24 0000 -- 61 20 109/55 99 % AC   11/26/24 2345 -- 56 20 102/54 100 % AC   11/26/24 2257 -- 67 18 94/54 98 % AC   11/26/24 2255 -- 60 18 97/68 98 % AC   11/26/24 2240 36.8 °C (98.3 °F) 61 18 94/54 99 % AC   11/26/24 2215 -- 60 20 97/52 100 % AC   11/26/24 2150 -- 58 17 95/54 98 % AC   11/26/24 2133 -- 66 23 88/50 98 % AC   11/26/24 2108 36.3 °C (97.4 °F) 59 16 88/51 100 % AA          Pulse Ox %: 98 % (11/27/24 0001)  Pulse Ox Interpretation: Normal (11/27/24 0001)  Heart Rate: 67 (11/27/24 0001)  Rhythm Strip Interpretation: Normal Sinus Rhythm (11/27/24 0001)     Physical Exam   PHYSICAL EXAM     Physical Exam  Vitals and nursing note reviewed.   Constitutional:       Appearance: He is well-developed.   HENT:      Head: Normocephalic and atraumatic.   Eyes:      Comments: Pale conjunctiva   Cardiovascular:      Rate and Rhythm: Normal rate and regular rhythm.   Pulmonary:      Effort: Pulmonary effort is normal.      Breath sounds: Normal breath sounds.   Abdominal:      General: There is no distension.      Palpations: Abdomen is soft. There is no mass.      Tenderness: There is no abdominal tenderness.      Comments: Ostomy with melena in the bag, briskly heme positive   Musculoskeletal:         General: No  tenderness or deformity. Normal range of motion.      Cervical back: Normal range of motion.   Skin:     General: Skin is warm and dry.   Neurological:      Mental Status: He is alert. Mental status is at baseline.   Psychiatric:         Behavior: Behavior normal.           PROCEDURES     Critical Care    Performed by: Nathaly Vallejo PA C  Authorized by: Marcello Trinh MD    Critical care provider statement:     Critical care time (minutes):  35    Critical care was time spent personally by me on the following activities:  Ordering and performing treatments and interventions, ordering and review of laboratory studies, ordering and review of radiographic studies, pulse oximetry, re-evaluation of patient's condition, review of old charts, development of treatment plan with patient or surrogate, evaluation of patient's response to treatment, examination of patient, interpretation of cardiac output measurements and obtaining history from patient or surrogate       DATA     Results       Procedure Component Value Units Date/Time    Prepare RBC-(BLOOD BANK ORDER for PRODUCT): 1 Units Transfusion indications: Acute Bleeding [[id number]] Collected: 11/26/24 2157     Updated: 11/28/24 0120     Product Code Y3669M80     Unit ID [id number]     Unit ABO O     Unit RH Positive     Crossmatch Compatible     Product Status PT     Unit Expiration Date/Time 021681353217     ISBT Code 5100    Comprehensive metabolic panel [[id number]]  (Abnormal) Collected: 11/26/24 2117    Specimen: Blood, Venous Updated: 11/26/24 2217     Sodium 140 mEQ/L      Potassium 4.1 mEQ/L      Comment: Results obtained on plasma. Plasma Potassium values may be up to 0.4 mEQ/L less than serum values. The differences may be greater for patients with high platelet or white cell counts.        Chloride 108 mEQ/L      CO2 24 mEQ/L      BUN 26 mg/dL      Creatinine 1.6 mg/dL      Glucose 135 mg/dL      Calcium 8.4 mg/dL      AST (SGOT) 28 IU/L      ALT  (SGPT) 5 IU/L      Alkaline Phosphatase 97 IU/L      Total Protein 7.4 g/dL      Comment: Test performed on plasma which typically contains approximately 0.4 g/dL more protein than serum.        Albumin 2.9 g/dL      Bilirubin, Total 0.6 mg/dL      eGFR 39.9 mL/min/1.73m*2      Comment: Calculation based on the Chronic Kidney Disease Epidemiology Collaboration (CKD-EPI) equation refit without adjustment for race.        Anion Gap 8 mEQ/L     Type and Screen [facility] [[id number]] Collected: 11/26/24 2117    Specimen: Blood, Venous Updated: 11/26/24 2210     Specimen Expiration 11/29/2024     Antibody Screen Negative     ABO O     Rh Factor Positive     History Check Previous type on file    Lactate, w/ reflex repeat if > 2.0 [[id number]]  (Abnormal) Collected: 11/26/24 2155    Specimen: Blood, Venous Updated: 11/26/24 2204     Lactate 2.1 mmol/L     CBC and differential [[id number]]  (Abnormal) Collected: 11/26/24 2117    Specimen: Blood, Venous Updated: 11/26/24 2149     WBC 4.79 K/uL      RBC 2.47 M/uL      Hemoglobin 8.1 g/dL      Hematocrit 26.1 %      .7 fL      MCH 32.8 pg      MCHC 31.0 g/dL      RDW 19.2 %      Platelets 103 K/uL      MPV 10.4 fL      Differential Type Auto     nRBC 0.0 %      Immature Granulocytes 0.4 %      Neutrophils 73.5 %      Lymphocytes 11.5 %      Monocytes 11.3 %      Eosinophils 2.5 %      Basophils 0.8 %      Immature Granulocytes, Absolute 0.02 K/uL      Neutrophils, Absolute 3.52 K/uL      Lymphocytes, Absolute 0.55 K/uL      Monocytes, Absolute 0.54 K/uL      Eosinophils, Absolute 0.12 K/uL      Basophils, Absolute 0.04 K/uL             Imaging Results              CT ANGIOGRAPHY ABDOMEN PELVIS WITH AND WITHOUT IV CONTRAST (Final result)  Result time 11/27/24 07:14:26      Final result                   Impression:    IMPRESSION: No intraluminal GI bleed is demonstrated.    There is extensive diverticulosis particularly at the sigmoid colon where there  are radiodense  diverticula and nonspecific wall prominence.    Urinary bladder wall prominence may reflect urinary outlet obstruction given an  enlarged prostate impinging on the base of the bladder.    Marked atherosclerotic vascular calcification is again demonstrated.    There is a saccular aneurysm, somewhat thrombosed at the right internal iliac  artery as before.    There is enlarged right atrium with reflux of contrast material into the  inferior vena cava suggesting right heart failure, correlate clinically.    Bilateral pleural effusions are demonstrated.    See comment for delineation of findings.    Results were communicated to the clinical service at the time of the study by  Dr. Wilson, the radiologist on-call.                   Narrative:    CLINICAL HISTORY: GI bleed    COMMENT: CT angiography was performed of the abdomen and pelvis according to GI  bleed protocol. Pre and post enhanced helical axial imaging was obtained. For  the enhanced component the patient received 80 cc of intravenous Isovue-370 but  no oral contrast as per protocol. Following contrast administration, helical  axial imaging was obtained in both arterial and venous phases. Coronal and  sagittal images were generated. Initial imaging demonstrated lack of significant  opacification of the arterial vasculature because of timing contrast and  probable right heart failure. Repeat imaging was obtained following  administration of additional 80 cc of intravenous Isovue-370. Most  recent CT  study is of October 2024.    CT DOSE:  One or more dose reduction techniques (e.g. automated exposure  control, adjustment of the mA and/or kV according to patient size, use of  iterative reconstruction technique) utilized for this examination.  .    3D MIP and/or volume rendered image reconstruction performed and reviewed.    At the included lung bases there is a sizable dependent left pleural effusion  and a smaller right pleural effusion. There is significant  lower lobe  compressive atelectasis. There is some motion artifact limiting detail. Included  cardiac chambers are enlarged the right atrium. There is reflux of contrast  material through a distended inferior vena cava as well as the hepatic veins,  correlate with right heart function.    There is no intraluminal bleed demonstrated in the GI tract. There are numerous  radiodense  diverticula at the sigmoid colon where there is underlying wall  thickening which limits assessment. This may reflect chronic change and  underdistention. This study does not serve as definitive evaluation of bowel.  There is a moderate stool burden throughout the colon overall. Stomach is partly  physiologically distended. Small bowel is normal in distribution and caliber.  Some nonspecific increase in the retroperitoneal fascial planes and the  mesenteric planes which may reflect trace amounts of fluid or other infiltrative  process, correlate clinically. There is no gross free air. There is no drainable  abscess or collection. There is no gross free fluid.    Liver and spleen are stable. There is no new mass or defect. Minimal punctate  splenic calcification is again noted. Gallbladder is surgically absent. There is  no biliary ductal dilatation. There is some fatty infiltration of the pancreas.  Kidneys enhance and excrete bilaterally. There are numerous exophytic cyst. A  few small lesions are too small to characterize. There is no new bulky adrenal  mass or hemorrhage.    There is marked atherosclerotic vascular calcification including at the  abdominal aorta and iliac arteries as well as the visceral arterial  calcification including a tortuous calcified splenic artery. Vessels are  perfused throughout. There is saccular aneurysm again at the right internal  iliac artery measuring up to 3.6  cm.        Urinary bladder is only partly physiologically distended with marked wall  prominence. This may reflect outlet obstruction due to  enlarged heterogeneous  prostate which impinges on the base the bladder. It measures  close to 5 cm  transversely in axial plane.    There is ventral hernia at the left mid abdominal body wall containing a short  segment of colon and some fat. There is no associated incarceration or  obstruction.    There is osseous mineralization along with marked discogenic and changes again  at the lumbar spine. There is compression deformity again at L1.                        Preliminary Interpretation    Patient Name: Samy Elena  Exam(s): a/p standard CT  a/p stone CT  Angio Abd/Pel CT  MR#: [id number]         History: GI Bleed    Preliminary Impression:    Nondiagnostic study due to lack of opacification of arterial vasculature likely due to timing of contrast in setting of heart failure. No significant CT evidence of contrast blooming seen on delayed images. If clinical concern persists, repeat imaging recommended.  All other findings stable compared to prior CT abdomen/pelvis.    Interpreted by: Holli Wilson DO, Nov 26, 2024 11:11 PM    Addendum: Additional images were added for evaluation.  No CT evidence of active GI bleeding.  Stable partially thrombosed saccular aneurysmal dilation of the right internal iliac artery.  Colonic diverticulosis with no evidence of diverticulitis.  Large left pleural effusion.  Other findings stable.    Interpreted by: Holli Wilson DO, Nov 26, 2024 11:29 PM    Samy Elena [id number], Nov 26, 2024, Updated at: Nov 26, 202 4, 11:29 PM                                         ECG 12 lead   Independent Interpretation by ED Provider   A fluid with slow ventricular response, RBBB prolonged QTc.  Reviewed by me independently interpreted by MD.          Scoring tools                                  ED Course & MDM   MDM / ED COURSE / CLINICAL IMPRESSION / DISPO     Medical Decision Making  89-year-old male presenting for dark stool, concerns for rectal bleed.  Patient  "had recent admission and was found to have active bleed on CTA.  On exam he is hypotensive, but mentating appropriately.  Colostomy bag with melena, which is briskly heme positive.  Concern for possible upper GI bleed, patient consented for blood, given the dynamic instability with melena in the bag, ordered emergency release blood, type and screen, CTA ordered as well.  Patient with 2 large-bore IVs.  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     Details: Admission 10/15/2024 notes  Labs: ordered. Decision-making details documented in ED Course.  Radiology: ordered and independent interpretation performed. Decision-making details documented in ED Course.  ECG/medicine tests: ordered and independent interpretation performed.    Risk  Prescription drug management.  Decision regarding hospitalization.        ED Course as of 11/28/24 1937 Tue Nov 26, 2024 2149 Patient has 2 large-bore IVs, hypotensive, not tachycardic but he is on metoprolol, patient with florencio melena in the bag, heme positive.  Plan for emergency release blood [SB]   2212 Matched blood ready right now - will use this [SB]   2220 BUN(!): 26 [SB]   2220 Creatinine(!): 1.6  Baseline ranging from 1.2-1.4 [SB]   2254 Repeat CT scan ordered-delay was not ideal for visualizing arterial side [SB]   Wed Nov 27, 2024   0024 Spoke with patient regarding CODE STATUS-he would allow compressions, but does not want any \"extraordinary measures\".  I spoke with the son, he does not want to be intubated, but would allow \"simple CPR\" [SB]   0038 Spoke with GI-agree with current plan with resuscitation, keep n.p.o. for flex sig in the " morning [SB]      ED Course User Index  [SB] Nathaly Vallejo PA C     Clinical Impression      Aneurysm of right iliac artery (CMS/HCC)   Gastrointestinal hemorrhage, unspecified gastrointestinal hemorrhage type   QT prolongation     _________________       ED Disposition   Admit / Observation                       Nathaly Vallejo PA C  11/28/24 1937

## 2024-11-27 NOTE — PLAN OF CARE
Plan of Care Review  Plan of Care Reviewed With: patient, family  Progress: no change  Outcome Evaluation: Pt arrrived on unit, pulled over from stretcher to bed. Placed on tele, controlled A-fib with RBBB. AAOx3, breathing unlabored. Skin assessment done with 2 RNs see flowsheet. Jeffery Long at bedside updated with plan of care.

## 2024-11-27 NOTE — OP NOTE
_______________________________________________________________________________  Patient Name: Samy Elena          Procedure Date: 11/27/2024 12:27 PM  MRN: [id number]                     Account Number: [id number]  YOB: 1930              Age: 89  Gender: Male                          Note Status: Finalized  Attending MD: MAGGIE SIMS MD~BETHANY,  _______________________________________________________________________________  Procedure:             Upper GI endoscopy  Indications:           Hematochezia  Providers:             MAGGIE SIMS MD~BETHANY (Doctor), MARGARITO GAXIOLA DO (Fellow)  Referring MD:          ARTURO ORTIZ MD  Requesting Provider:  Medicines:             See the Anesthesia note for documentation of the  administered medications  Complications:         No immediate complications.  _______________________________________________________________________________  Procedure:             After obtaining informed consent, the endoscope was  passed under direct vision. Throughout the procedure,  the patient's blood pressure, pulse, and oxygen  saturations were monitored continuously. The endoscope  was introduced through the mouth, and advanced to the  second part of duodenum. The upper GI endoscopy was  accomplished without difficulty. The patient tolerated  the procedure well.  Findings:  The examined esophagus was normal.  The entire examined stomach was normal.  The duodenal bulb, first portion of the duodenum and second portion of  the duodenum were normal.  Impression:            - Normal esophagus.  - Normal stomach.  - Normal duodenal bulb, first portion of the duodenum  and second portion of the duodenum.  - No specimens collected.  Recommendation:        - [facility] to [facility] care.  - Resume regular diet.  Procedure Code(s):     --- Professional ---  13726, Esophagogastroduodenoscopy, flexible,  transoral; diagnostic, including  collection of  specimen(s) by brushing or washing, when performed  (separate procedure)  Diagnosis Code(s):     --- Professional ---  K92.1, Melena (includes Hematochezia)  CPT copyright 2023 [employer]. All rights reserved.  The codes documented in this report are preliminary and upon  review may  be revised to meet current compliance requirements.  Attending Participation:  I was present and participated during the entire procedure, including  non-escobar portions.  David Holcomb MD  ______________________________  DAVID HOLCOMB MD~BETHANY  11/27/2024 1:19:50 PM  This report has been signed electronically.  Number of Addenda: 0  Note Initiated On: 11/27/2024 12:27 PM

## 2024-11-27 NOTE — H&P
Hospital Medicine  History & Physical        CHIEF COMPLAINT   Melena     HISTORY OF PRESENT ILLNESS      Samy Elena is a 89 y.o. 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    Patient with recent admission 10/15-10/24 with similar presentation.  CTA at the time with active bleed in colon proximal to colostomy.  IR performed successful subselective SMA and MARIE arteriography without evidence of discrete active bleed or pseudoaneurysm.  Then again with rectal bleed and flex sig demonstrating significant diverticular burden.  Patient required 4u pRBCs during that admission.  Plavix stopped and patient counseled to resume ASA in 10 days post discharge if no more evidence of bleed.    Patient reports has been doing well as an outpatient.  Hgb levels stabilized and has been gaining energy back.  Patient reports having resumed aspirin around 2 weeks ago.  This morning noted scant blood in ostomy that then progressively got worse.  Does note normally with dark/green stool due to iron supplement but this is very different.  Per urging from his son and home nurse, patient presented to ED for further evaluation.    In the ED, vitals initially were T97.4, HR 59, BP 88/51 (that improved to 90s/50s), SpO2 100% on RA.  Labs notable for WBC 4.79, Hgb 8.1 (last 8.3), Plts 103, Na 140, Cr 1.6 (from baseline 1), lactate 2.1 => 1.9.  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  Patient given IV PPI and 1u pRBCs and admitted.    On my evaluation patient reports feeling  well, no acute complaints such as chest pain, SOB, abd pain, dysuria.    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)     Coronary artery disease     Disease of thyroid gland     Will catheter in place     6/25 not at present    GI (gastrointestinal bleed)     Hypertension     Myocardial infarction (CMS/HCC)     Orthostatic hypotension     TIA (transient ischemic attack)        Past Surgical History   Procedure Laterality Date    cataract extraction right eye with implant and limbal relaxing incision Right 7/18/2024    Performed by Hayder Moses MD at  OR [facility]    Cataract extraction w/ intraocular lens implant Left     Cataract extraction with LRI and anterior vitrectomy left eye Left 11/16/2023    Performed by Hayder Moses MD at  OR [facility]    Cholecystectomy      Colostomy      COLOSTOMY LAPAROSCOPIC N/A 8/3/2022    [place] [facility] [place]    Coronary artery bypass graft      DIAGNOSTIC FLEXIBLE SIGMOIDOSCOPY WITH COLLECTION OF SPECIMEN BY WASHING N/A 10/18/2024    Performed by Maine Lopez MD at [facility]    FLEXIBLE SIGMOIDOSCOPY WITH BIOPSY N/A 10/23/2024    Performed by David Holcomb MD at [facility]    INCISION AND DRAINAGE WITH DEBRIDMENT OF BUTTOCK, AND PERINEUM N/A 8/2/2022    Performed by Mat Bryant MD at [facility]    Joint replacement Left     Knee    RIGHT HEART CATH N/A 8/4/2022    Performed by Cristal Lacey [place] [facility] [place]    Thyroidectomy      WASHOUT OF PERINEAL WOUND, COLONIC LAVAGE N/A 8/3/2022    Performed by Mat [facility] [place] [facility] [place]       PCP: Zachery Hernandez MD    MEDICATIONS      Prior to Admission medications    Medication Sig Start Date End Date Taking? Authorizing Provider   aspirin 81 mg enteric coated tablet Take 1 tablet (81 mg total)  by mouth daily. 10/24/24 11/23/24  Rhoda Herrera MD   bimatoprost (LUMIGAN) 0.01 % ophthalmic drops Administer 1 drop into the left eye nightly.    Clay Chambers MD   carboxymethylcellulose (REFRESH PLUS) 0.5 % dropperette Administer 1 drop into both eyes 4 (four) times a day as needed for dry eyes.    Clay Chambers MD   cyanocobalamin (VITAMIN B12) 500 mcg tablet Take 500 mcg by mouth every morning.    Clay Chambers MD   escitalopram (LEXAPRO) 5 mg tablet Take 5 mg by mouth every morning.    Clay Chambers MD   famotidine (PEPCID) 10 mg tablet Take 1 tablet (10 mg total) by mouth daily Indications: gastroesophageal reflux disease. 12/8/23 3/26/25  Crystal Alonso DO   ferrous sulfate 300 mg (60 mg iron)/5 mL liquid Take 5 mL (300 mg total) by mouth daily. 10/22/24 11/21/24  Rhoda Herrera MD   finasteride (PROSCAR) 5 mg tablet Take 1 tablet (5 mg total) by mouth daily. 8/2/24 9/24/25  Alex Morris DO   levothyroxine (SYNTHROID) 100 mcg tablet Take 100 mcg by mouth daily.    Clay Chambers MD   methenamine (HIPREX) 1 gram tablet Take 1 g by mouth daily before lunch.    Clay Chambers MD   metoprolol succinate XL (TOPROL-XL) 25 mg 24 hr tablet Take 12.5 mg by mouth daily with dinner.    Clay Chambers MD   tamsulosin (FLOMAX) 0.4 mg capsule Take 1 capsule (0.4 mg total) by mouth daily. 8/2/24 9/24/25  Alex Morris DO   thiamine 50 mg tablet Take 100 mg by mouth daily.    Clay Chambers MD   torsemide (DEMADEX) 10 mg tablet Take 10 mg by mouth daily.    Clay Chambers MD   amLODIPine (NORVASC) 2.5 mg tablet amlodipine 2.5 mg tablet  7/22/22  John Chambers MD   apixaban (ELIQUIS) 2.5 mg tablet   7/22/22  John Chambers MD   hydrochlorothiazide (MICROZIDE) 12.5 mg capsule hydrochlorothiazide 12.5 mg capsule  7/22/22  Provider, MD John       ALLERGIES      Jardiance  [[email or web address]]    FAMILY HISTORY      No family history on file.    SOCIAL HISTORY      Social History     Socioeconomic History    Marital status:    Tobacco Use    Smoking status: Never    Smokeless tobacco: Never   Vaping Use    Vaping status: Never Used   Substance and Sexual Activity    Alcohol use: Not Currently     Comment: social    Drug use: Never    Sexual activity: Defer     Social Drivers of Health     Financial Resource Strain: Low Risk  (8/4/2023)    Overall Financial Resource Strain (CARDIA)     Difficulty of Paying Living Expenses: Not hard at all   Food Insecurity: No Food Insecurity (11/27/2024)    Hunger Vital Sign     Worried About Running Out of Food in the Last Year: Never true     Ran Out of Food in the Last Year: Never true   Transportation Needs: No Transportation Needs (10/16/2024)    PRAPARE - Transportation     Lack of Transportation (Medical): No     Lack of Transportation (Non-Medical): No   Housing Stability: Low Risk  (10/16/2024)    Housing Stability Vital Sign     Unable to Pay for Housing in the Last Year: No     Number of Times Moved in the Last Year: 0     Homeless in the Last Year: No       REVIEW OF SYSTEMS      All other systems reviewed and negative except as noted in HPI    PHYSICAL EXAMINATION      Temp:  [36.3 °C (97.4 °F)-36.8 °C (98.3 °F)] 36.7 °C (98 °F)  Heart Rate:  [56-67] 65  Resp:  [16-23] 18  BP: ()/(50-71) 125/71  Body mass index is 21.08 kg/m².    Physical Exam  Constitutional:       General: He is not in acute distress.     Appearance: He is not ill-appearing, toxic-appearing or diaphoretic.   HENT:      Head: Normocephalic and atraumatic.      Mouth/Throat:      Mouth: Mucous membranes are dry.   Eyes:      General: No scleral icterus.  Cardiovascular:      Rate and Rhythm: Normal rate.      Heart sounds: Murmur heard.      Comments: Systolic murmur LLSB  Pulmonary:      Effort: Pulmonary effort is normal. No respiratory distress.      Breath  sounds: Normal breath sounds. No wheezing, rhonchi or rales.   Abdominal:      Palpations: Abdomen is soft.      Tenderness: There is no abdominal tenderness. There is no guarding or rebound.      Comments: Ostomy with melenic stool   Musculoskeletal:      Right lower leg: Edema present.      Left lower leg: Edema present.      Comments: 1+ bilateral pitting edema   Skin:     General: Skin is warm and dry.   Neurological:      General: No focal deficit present.      Mental Status: He is alert and oriented to person, place, and time.   Psychiatric:         Mood and Affect: Mood normal.         Behavior: Behavior normal.         Thought Content: Thought content normal.         Judgment: Judgment normal.         LABS / IMAGING / EKG        Labs  CBC Results         11/26/24 10/24/24 10/23/24     2117 0310 2044    WBC 4.79 4.90 4.62    RBC 2.47 2.60 2.84    HGB 8.1 8.3 8.8    HCT 26.1 25.3 27.5    .7 97.3 96.8    MCH 32.8 31.9 31.0    MCHC 31.0 32.8 32.0     103 107          CMP Results         11/26/24 10/24/24 10/23/24     2117 0310 0304     138 135    K 4.1 3.3 3.5    Cl 108 108 105    CO2 24 21 21    Glucose 135 82 108    BUN 26 21 22    Creatinine 1.6 1.0 1.2    Calcium 8.4 7.1 7.9    Anion Gap 8 9 9    AST 28 -- --    ALT 5 -- --    Albumin 2.9 -- --    EGFR 39.9 >60.0 56.4           Comment for K at 2117 on 11/26/24: Results obtained on plasma. Plasma Potassium values may be up to 0.4 mEQ/L less than serum values. The differences may be greater for patients with high platelet or white cell counts.    Comment for EGFR at 2117 on 11/26/24: Calculation based on the Chronic Kidney Disease Epidemiology Collaboration (CKD-EPI) equation refit without adjustment for race.    Comment for EGFR at 0310 on 10/24/24: Calculation based on the Chronic Kidney Disease Epidemiology Collaboration (CKD-EPI) equation refit without adjustment for race.    Comment for EGFR at 0304 on 10/23/24: Calculation based on  the Chronic Kidney Disease Epidemiology Collaboration (CKD-EPI) equation refit without adjustment for race.          Imaging  I have independently reviewed the pertinent imaging from the last 24 hrs.  See HPI    ECG/Telemetry  I have independently reviewed the ECG. Significant findings include rate controlled afib with RBBB.    ASSESSMENT AND PLAN              Assessment & Plan  Gastrointestinal hemorrhage, unspecified gastrointestinal hemorrhage type  Recurrent GI bleed with melena.  Most likely similar presentation as prior - possible diverticular bleed.  Hemodynamically stable, Hgb stable compared to prior although may have not equilibrated.  Less likely new upper GIB but will maintain on PPI for now.  - GI consulted, appreciate recs  - Trend CBC, transfuse Hgb >7, active T+S  - Two large bore IVs  - Hold ASA for now, unclear if would be able to tolerate in future  - Hold metoprolol given new acute bleed  - NPO for now, discuss with GI if needs procedures  Ischemic congestive cardiomyopathy (CMS/HCC)  No signs of decompensation at this point.  Trend volume exam.  Per above holding aspirin in setting of recurrent GIB  - Hold metop and torsemide given acute GIB  - Resume as able    CALI (acute kidney injury) (CMS/HCC)  ?CALI on CKD with Cr 1.6 from 1 on last admission but previously baseline 1.4-1.6.  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  Wound ostomy consult.  History of recurrent UTIs  No urinary symptoms  currently.  Hx of ESBL - contact precautions.  Holding home suppressive antibiotic due to CALI, resume when able  Thrombocytopenia (CMS/HCC)  Stable, chronic, trend CBC  Recurrent pleural effusion on left  Noted on imaging this admission.  Satting well without respiratory symptoms.  Consider thoracentesis prn.  VTE Assessment: Padua    VTE Prophylaxis: Current anticoagulants:    None      Code Status: Full Code      Estimated Discharge Date: 11/29/2024  Disposition Planning: Pending GI eval, anticipate 2-3 days     Gemma Mckeon MD  11/27/2024

## 2024-11-27 NOTE — CONSULTS
Gastroenterology  Consultation Note       REASON FOR CONSULT   Melena, recent admit for GIB     Consulting Provider: Dr. Mckeon   HISTORY OF PRESENT ILLNESS      This is a 89 y.o. male with a past medical history of HFpEF ref, torrential TR, recurrent left pleural effusion, dysphagia, A-fib, CAD status post CABG, 40 years gangrene of perineum status post end colostomy, recent GI bleed who presents with blood from his ostomy.    Patient is a poor historian and tangential.  He believes he noticed maroon stool from the ostomy starting yesterday.  Noticed any rectal bleeding.  No significant associated abdominal pain, nausea, vomiting.  No new medications since last discharge.    Patient was admitted last month for similar presentation.  The CTA at that time showed active bleed in the colon just proximal to the colostomy.  He went to IR where there was no active bleeding but he underwent successful dissection of the sigmoid artery.  His bleeding slowed and then recurred.Underwent flex sig through the stoma which was intact without any signs of ischemia or strangulation, there were many diverticula in the left colon and 2 tiny polyps which were not removed due to recent Plavix use.  No active bleeding at that time.  The following week he developed bleeding from his rectum.  He underwent flex sig via rectum which showed red blood with clots filling the rectum and sigmoid.  Blood obscured ability to see site of bleeding but presumed diverticular versus possible diversion proctitis.  His Plavix was stopped and he was discharged just on aspirin.    In the ED, he was afebrile, borderline hypotensive.  Hemoglobin 8.1 on presentation, 7.9 on repeat.  Was 8.3 at time of last discharge and baseline is around 8.  He underwent CTA which did not detect active bleeding.    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)     Coronary artery disease     Disease of thyroid gland     Will catheter in place     6/25 not at present    GI (gastrointestinal bleed)     Hypertension     Myocardial infarction (CMS/HCC)     Orthostatic hypotension     TIA (transient ischemic attack)        PSHx:  Past Surgical History   Procedure Laterality Date    cataract extraction right eye with implant and limbal relaxing incision Right 7/18/2024    Performed by Hayder Moses MD at  OR [facility]    Cataract extraction w/ intraocular lens implant Left     Cataract extraction with LRI and anterior vitrectomy left eye Left 11/16/2023    Performed by Hayder Moses MD at  OR [facility]    Cholecystectomy      Colostomy      COLOSTOMY LAPAROSCOPIC N/A 8/3/2022    Performed by Mat Bryant MD at [facility] [place]    Coronary artery bypass graft      DIAGNOSTIC FLEXIBLE SIGMOIDOSCOPY WITH COLLECTION OF SPECIMEN BY WASHING N/A 10/18/2024    Performed by Maine Lopez MD at [facility] [place] [facility]    FLEXIBLE SIGMOIDOSCOPY WITH BIOPSY N/A 10/23/2024    Performed by David Holcomb MD at [facility]    INCISION AND DRAINAGE WITH DEBRIDMENT OF BUTTOCK, AND PERINEUM N/A 8/2/2022    Performed by Mat Bryant MD at [facility] [place] [facility]    Joint replacement Left     Knee    RIGHT HEART CATH N/A 8/4/2022    Performed by Cristal Lacey MD at [facility] [place] [facility] [place]    Thyroidectomy      WASHOUT OF PERINEAL WOUND, COLONIC LAVAGE N/A 8/3/2022    Performed by Mat Bryant MD at [facility] [place] [facility]       PCP:   Zachery Hernandez MD    MEDICATIONS      (Not in a hospital admission)      Home medications were personally reviewed.    ALLERGIES      Jardiance [empagliflozin]    FAMILY HISTORY      No family history on file.    SOCIAL HISTORY      Social History     Socioeconomic History    Marital status:    Tobacco Use    Smoking status: Never    Smokeless tobacco: Never   Vaping Use    Vaping status: Never Used    Substance and Sexual Activity    Alcohol use: Not Currently     Comment: social    Drug use: Never    Sexual activity: Defer     Social Drivers of Health     Financial Resource Strain: Low Risk  (8/4/2023)    Overall Financial Resource Strain (CARDIA)     Difficulty of Paying Living Expenses: Not hard at all   Food Insecurity: No Food Insecurity (11/27/2024)    Hunger Vital Sign     Worried About Running Out of Food in the Last Year: Never true     Ran Out of Food in the Last Year: Never true   Transportation Needs: No Transportation Needs (10/16/2024)    PRAPARE - Transportation     Lack of Transportation (Medical): No     Lack of Transportation (Non-Medical): No   Housing Stability: Low Risk  (10/16/2024)    Housing Stability Vital Sign     Unable to Pay for Housing in the Last Year: No     Number of Times Moved in the Last Year: 0     Homeless in the Last Year: No       REVIEW OF SYSTEMS      Review of Systems  See above   PHYSICAL EXAMINATION      Temp:  [36.3 °C (97.4 °F)-36.8 °C (98.3 °F)] 36.6 °C (97.9 °F)  Heart Rate:  [56-67] 66  Resp:  [16-23] 16  BP: ()/(50-77) 101/57  Body mass index is 21.08 kg/m².    -Constitutional: alert, well-nourished, non-diaphoretic, no apparent distress  -Cardiovascular: normal rate  -Pulmonary/Chest: on room air   -Abdomen: soft, non-distended, non-tender to palpation, dark stool in ostomy  -Skin/Extremities: dry and warm   -Neurological: Answering questions appropriately  LABS / IMAGING / EKG        Labs  Results from last 7 days   Lab Units 11/27/24 0254 11/26/24 2117   WBC K/uL 4.33 4.79   HEMOGLOBIN g/dL 7.9* 8.1*   HEMATOCRIT % 24.5* 26.1*   PLATELETS K/uL 89* 103*     Results from last 7 days   Lab Units 11/27/24 0254 11/26/24 2117   SODIUM mEQ/L 139 140   POTASSIUM mEQ/L 4.2 4.1   CHLORIDE mEQ/L 109* 108*   CO2 mEQ/L 22 24   BUN mg/dL 27* 26*   CREATININE mg/dL 1.5* 1.6*   CALCIUM mg/dL 8.2* 8.4*   ALBUMIN g/dL  --  2.9*   BILIRUBIN TOTAL mg/dL  --  0.6   ALK  PHOS IU/L  --  97   ALT IU/L  --  5*   AST IU/L  --  28   GLUCOSE mg/dL 111* 135*         Imaging:  Reviewed.        ASSESSMENT AND PLAN      No new Assessment & Plan notes have been filed under this hospital service since the last note was generated.  Service: Gastroenterology    #Bleeding from stoma  89-year-old male with significant cardiac history and recent GI bleeding both from stoma and rectum who presents with recurrent bleeding from stoma.  Hemoglobin with slight drop from baseline.  Blood pressure responsive to resuscitation.  CTA did not detect active bleeding. Stool in ostomy is currently dark, patient reports maroon stool yesterday. Likely recurrent diverticular bleed.      - Keep NPO for now.  Will discuss possible flex sig via stoma today versus close monitoring as previous bleeding was suspected to be diverticular and we may be limited in what we can intervene on endoscopically  - Please transfuse additional unit for hemoglobin goal above 8  - Please consult cardiology given history of torrential TR for risk stratification and periprocedural cardiac management recommendations  - Okay for aspirin  - Maintain 2 large-bore IVs, active type and screen, consent  - Patient is well-known to Dr. Bryant, would consider letting him know patient is here again      Final Recommendations Pending Attending Attestation     Susanna Brown DO  Gastroenterology Fellow, PGY-4  Pager [phone]    Some or all of the below documentation was generated using voice recognition/dictation software. Please note this dictation software can have anomalies in its ability to transcribe. Please excuse errors which may have occurred due to this, and do not hesitate to contact me directly for anything that seems abnormal for clarification.        VTE Assessment: Padua    Code Status: Full Code  Estimated discharge date: 11/29/2024

## 2024-11-27 NOTE — OP NOTE
_______________________________________________________________________________  Patient Name: Samy Elena          Procedure Date: 11/27/2024 12:29 PM  MRN: [id number]                     Account Number: [id number]  YOB: 1930              Age: 89  Gender: Male                          Note Status: Finalized  Attending MD: MAGGIE SIMS MD~BETHANY,  _______________________________________________________________________________  Procedure:             Flexible sigmoidoscopy via ostomy  Indications:           Hematochezia  Providers:             MAGGIE SIMS MD~BETHANY (Doctor), MARGARITO GAXIOLA DO (Fellow)  Referring MD:          ARTURO ORTIZ MD  Requesting Provider:  Medicines:             See the Anesthesia note for documentation of the  administered medications  Complications:         No immediate complications.  _______________________________________________________________________________  Procedure:             After obtaining informed consent, the endoscope was  passed under direct vision. Throughout the procedure,  the patient's blood pressure, pulse, and oxygen  saturations were monitored continuously. The endoscope  was introduced through the ostomy and advanced to 40  cm from the anal verge. The flexible sigmoidoscopy was  accomplished without difficulty. The patient tolerated  the procedure well. The quality of the bowel  preparation was poor.  Findings:  A moderate amount of solid stool was found in the sigmoid colon,  interfering with visualization.  Multiple small-mouthed diverticula were found in the sigmoid colon.  Impression:            - Preparation of the colon was poor.  - Stool in the sigmoid colon. Brown. No blood or  melena.  - Diverticulosis in the sigmoid colon.  - No specimens collected.  Recommendation:        - [facility] to [facility] for [facility] care.  Procedure Code(s):     --- Professional ---  74348, Sigmoidoscopy, flexible;  diagnostic, including  collection of specimen(s) by brushing or washing, when  performed (separate procedure)  Diagnosis Code(s):     --- Professional ---  K92.1, Melena (includes Hematochezia)  K57.30, Diverticulosis of large intestine without  perforation or abscess without bleeding  CPT copyright 2023 [employer]. All rights reserved.  The codes documented in this report are preliminary and upon  review may  be revised to meet current compliance requirements.  Attending Participation:  I was present and participated during the entire procedure, including  non-escobar portions.  David Holcomb MD  ______________________________  DAVID HOLCOMB MD~BETHANY  11/27/2024 1:31:07 PM  This report has been signed electronically.  Number of Addenda: 0  Note Initiated On: 11/27/2024 12:29 PM

## 2024-11-27 NOTE — ASSESSMENT & PLAN NOTE
No signs of decompensation at this point.  Trend volume exam.  Per above holding aspirin in setting of recurrent GIB  - Hold metop and torsemide given acute GIB  - Resume as able

## 2024-11-27 NOTE — ED ATTESTATION NOTE
I have personally seen and examined Samy Elena.  I was involved in the care and medical decision making for this patient.    I reviewed and agree with physician assistant / nurse practitioner’s assessment and plan of care; any exceptions are documented below.      My focused history, examination, assessment and plan of care of Samy Elena is as follows:    Brief History:  HPI    Presents to ED with blood in stools    Black stools    Focused Physical Exam:  Physical Exam  Vitals and nursing note reviewed.   Constitutional:       General: He is not in acute distress.     Appearance: He is well-developed.   HENT:      Head: Atraumatic.   Eyes:      Conjunctiva/sclera: Conjunctivae normal.   Cardiovascular:      Rate and Rhythm: Normal rate.   Pulmonary:      Effort: Pulmonary effort is normal.   Musculoskeletal:         General: No deformity.   Skin:     General: Skin is warm and dry.   Neurological:      Mental Status: He is alert. Mental status is at baseline.   Psychiatric:         Behavior: Behavior normal.             Assessment / Plan:      Black stools heme positive.  Consented for blood.  Will transfuse.  Negative CTA.  Critical Care  Performed by: Marcello Trinh MD  Authorized by: Marcello Trinh MD    Critical care provider statement:     Critical care time (minutes):  35    Critical care time was exclusive of:  Separately billable procedures and treating other patients    Critical care was time spent personally by me on the following activities:  Development of treatment plan with patient or surrogate, , evaluation of patient's response to treatment, examination of patient, review of old charts, re-evaluation of patient's condition, pulse oximetry, ordering and review of radiographic studies, ordering and review of laboratory studies and ordering and performing treatments and interventions            Medical Decision Making      I was physically present for the key/critical portions of the  following procedures:  Procedures           Marcello Trinh MD  11/27/24 0603

## 2024-11-27 NOTE — ANESTHESIOLOGIST PRE-PROCEDURE ATTESTATION
Pre-Procedure Patient Identification:  I am the Primary Anesthesiologist and have identified the patient on 11/27/24 at 2:12 PM.   I have confirmed the procedure(s) will be performed by the following surgeon/proceduralist David Holcomb MD.

## 2024-11-27 NOTE — PROGRESS NOTES
Brief Nonbillable [facility] note.    Assumed care of this pt in collaboration with Dr. Alonso this morning.    Please refer to Dr. Mckeon's H&P dated today for full and complete details.    #Bleeding from stoma  --Likely recurrent diverticular bleed  --CTA did not detect active bleeding  --GI input appreciated  --S/p EGD/flex sig via ostomy which was only significant for diverticulosis and no red blood/melena seen  --If he has bright red blood from stoma in the future would get CTA if large volume and consider ostomy wound care eval for potential irritation/oozing from site  --Pt is known to Dr. Bryant who was made aware that pt is here  --Continue aspirin  --Hgb 7.9 and received 1U PRBCs  --Monitor cbc, ensure active T&S, Transfuse for hgb <7 or <8 if symptomatic.    HEIDI Ryan

## 2024-11-27 NOTE — PROGRESS NOTES
Spoke with patient's son and confirmed with medication list from [facility] and/or patient's own pharmacy to complete the medication reconciliation.     Prior to admission medication list:  Current Outpatient Medications:     aspirin 81 mg enteric coated tablet, Take 81 mg by mouth daily., Disp: , Rfl:     calcium, as carbonate, (TUMS) 200 mg calcium (500 mg) chewable tablet, Take 1 tablet by mouth daily., Disp: , Rfl:     ferrous sulfate 325 mg (65 mg iron) tablet, Take 65 mg by mouth daily with breakfast., Disp: , Rfl:     melatonin 10 mg capsule, Take 10 mg by mouth nightly., Disp: , Rfl:     omeprazole (PriLOSEC) 20 mg capsule, Take 20 mg by mouth daily before breakfast., Disp: , Rfl:     potassium chloride (KLOR-CON) 10 mEq CR tablet, Take 10 mEq by mouth 2 (two) times a day., Disp: , Rfl:     thiamine HCl (VITAMIN B1) 100 mg tablet, Take 100 mg by mouth daily., Disp: , Rfl:     bimatoprost (LUMIGAN) 0.01 % ophthalmic drops, Administer 1 drop into the left eye nightly., Disp: , Rfl:     carboxymethylcellulose (REFRESH PLUS) 0.5 % dropperette, Administer 1 drop into both eyes every 2 (two) hours while awake., Disp: , Rfl:     cyanocobalamin (VITAMIN B12) 500 mcg tablet, Take 500 mcg by mouth every morning., Disp: , Rfl:     escitalopram (LEXAPRO) 5 mg tablet, Take 5 mg by mouth every morning., Disp: , Rfl:     famotidine (PEPCID) 10 mg tablet, Take 1 tablet (10 mg total) by mouth daily Indications: gastroesophageal reflux disease., Disp: 30 tablet, Rfl: 0    finasteride (PROSCAR) 5 mg tablet, Take 1 tablet (5 mg total) by mouth daily., Disp: 30 tablet, Rfl: 0    levothyroxine (SYNTHROID) 100 mcg tablet, Take 100 mcg by mouth daily., Disp: , Rfl:     methenamine (HIPREX) 1 gram tablet, Take 1 g by mouth daily before lunch., Disp: , Rfl:     metoprolol succinate XL (TOPROL-XL) 25 mg 24 hr tablet, Take 12.5 mg by mouth daily with dinner., Disp: , Rfl:     tamsulosin (FLOMAX) 0.4 mg capsule, Take 1 capsule (0.4 mg  total) by mouth daily., Disp: 30 capsule, Rfl: 0    torsemide (DEMADEX) 10 mg tablet, Take 10 mg by mouth daily., Disp: , Rfl:      Comments about home medications:  -As per patients son, his provider stopped the clopidogrel and has him taking aspirin instead now  -Patients most recent potassium chloride regimen is 10 mEq twice a day    Compliance:   -No concerns based on fill history or discussion with patient's son

## 2024-11-27 NOTE — ASSESSMENT & PLAN NOTE
Recurrent GI bleed with melena.  Most likely similar presentation as prior - possible diverticular bleed.  Hemodynamically stable, Hgb stable compared to prior although may have not equilibrated.  Less likely new upper GIB but will maintain on PPI for now.  - GI consulted, appreciate recs  - Trend CBC, transfuse Hgb >7, active T+S  - Two large bore IVs  - Hold ASA for now, unclear if would be able to tolerate in future  - Hold metoprolol given new acute bleed  - NPO for now, discuss with GI if needs procedures

## 2024-11-27 NOTE — ASSESSMENT & PLAN NOTE
?CALI on CKD with Cr 1.6 from 1 on last admission but previously baseline 1.4-1.6.  Possible volume depletion in setting of acute bleed but more likely at baseline  - Trend BMP, avoid nephrotoxins, renally dose medications  - f/up repeat Cr after transfusion

## 2024-11-27 NOTE — NURSING NOTE
Wound Ostomy Continence Note    Subjective    HPI Patient is a 89 y.o. male who was admitted on 11/26/2024 with a diagnosis of Aneurysm of right iliac artery (CMS/HCC) [I72.3]  QT prolongation [R94.31]  Gastrointestinal hemorrhage, unspecified gastrointestinal hemorrhage type [K92.2].    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)    History of recurrent UTIs    Thrombocytopenia (CMS/HCC)    CALI (acute kidney injury) (CMS/HCC)    Recurrent pleural effusion on left     PMH/PSH 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)     Coronary artery disease     Disease of thyroid gland     Will catheter in place     6/25 not at present    GI (gastrointestinal bleed)     Hypertension     Myocardial infarction (CMS/HCC)     Orthostatic hypotension     TIA (transient ischemic attack)      Past Surgical History   Procedure Laterality Date    cataract extraction right eye with implant and limbal relaxing incision Right 7/18/2024    Performed by Hayder Moses MD at  OR [facility]    Cataract extraction w/ intraocular lens implant Left     Cataract extraction with LRI and anterior vitrectomy left eye Left 11/16/2023    Performed by Hayder Moses MD at  OR [facility]    Cholecystectomy      Colostomy      COLOSTOMY LAPAROSCOPIC N/A 8/3/2022    [place] [facility] [place]    Coronary artery bypass graft      DIAGNOSTIC FLEXIBLE SIGMOIDOSCOPY WITH COLLECTION OF SPECIMEN BY WASHING N/A 10/18/2024    Performed by Maine Lopez [place] [facility]    FLEXIBLE SIGMOIDOSCOPY WITH BIOPSY  N/A 10/23/2024    [place] [facility] [place]    INCISION AND DRAINAGE WITH DEBRIDMENT OF BUTTOCK, AND PERINEUM N/A 8/2/2022    [place] [facility] [place]    Joint replacement Left     Knee    RIGHT HEART CATH N/A 8/4/2022    [place]al [place] [facility] [place]    Thyroidectomy      WASHOUT OF PERINEAL WOUND, COLONIC LAVAGE N/A 8/3/2022    [place] [facility] [place]      Assessment and Recommendation Ostomy management received;  --Intended to see pt, out of unit for procedure,   --will attempt again, colostomy supplies as needed, yellow barrier PS# [id number], yellow pouch PS# [id number];             Date: 11/27/24  Signature: Madonna Park RN

## 2024-11-27 NOTE — PROGRESS NOTES
Brief GI post procedure note:    Patient tolerated procedure well with no immediate complications.    Findings of EGD:   - Normal esophagus  - Normal stomach   - Normal duodenum    Findings of flex sig via ostomy:   - Solid brown stool throughout  - Diverticulosis  - No red blood or melena seen       Recommendations:  - Regular diet   - Has now had several endoscopies without active bleeding via stoma and now UGI source ruled out as well. Would not recommend putting him through further endoscopic procedures given high risk cardiac disease for getting anesthesia. If he has bright red blood from stoma in the future would get CTA if large volume and consider ostomy wound care eval for potential irritation/oozing from site  - Family called and updated       Susanna Brown DO  Gastroenterology Fellow, PGY-4  Pager [phone]

## 2024-11-27 NOTE — ANESTHESIA PREPROCEDURE EVALUATION
Relevant Problems   CARDIOVASCULAR   (+) A-fib (CMS/HCC)   (+) Atrial fibrillation (CMS/HCC)   (+) Coronary artery disease   (+) Hypertension   (+) Ischemic congestive cardiomyopathy (CMS/HCC)      GASTROINTESTINAL   (+) Dysphagia   (+) GERD (gastroesophageal reflux disease)   (+) GI bleeding   (+) Gastrointestinal hemorrhage, unspecified gastrointestinal hemorrhage type      HEMATOLOGY   (+) Anemia due to chronic kidney disease   (+) Thrombocytopenia (CMS/HCC)      NEUROLOGY   (+) Depression      RESPIRATORY SYSTEM   (+) Shortness of breath      URINARY SYSTEM   (+) CALI (acute kidney injury) (CMS/HCC)      Other   (+) Hypothyroidism       Anesthesia ROS/MED HX      Pulmonary    Shortness of breath  Cardiovascular   CAD   hypertension  Dysrhythmias   CHF   Cardiac clearance reviewed, echocardiogram reviewed and ECG reviewed  Endo/Other   Hypothyroidism  Body Habitus: Normal  ROS/MED HX Comments:    Cardiology: TTE 9/24:    · The left ventricular cavity size is small with mild left ventricular hypertrophy and mildly reduced systolic function. Estimated EF 40-45% by visual estimate. Abnormal septal motion due to RV volume overload. Unable to assess diastolic function due to technically difficult study. Low stroke volume (32 ml).      · The aortic valve is tricuspid. Aortic valve and root sclerosis. Mild aortic regurgitation.      · The mitral valve is thickened. Mild mitral annular calcification. Trace regurgitation.      · The left atrium is normal in size.      · The right ventricular cavity is massively dilated with severely decreased systolic function. Apical hypercontractility.      · The pulmonic valve is not well seen. There is trace pulmonic regurgitation.     · There is a 13 mm coaptation gap between the leaflets of the tricuspid valve resulting in torrential ( wide -open) tricuspid regurgitation (PISA EROA 2.4cm2, Rvol 96 mL). Tricuspid valve annulus is dilated and measures 6.8 cm in diameter. Unable to  assess RVSP in the setting of torrential tricuspid regurgitation.      · The right atrium is severely dilated.     · The IVC is massively dilated and collapses less than 50% with inspiration consistent with severely elevated right atrial pressure.      · No pericardial effusion. Large pleural effusion.      · Compared to previous echocardiogram from 3/28/2024, no significant change.     ROS/Hx: 89 y.o. 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     Patient with recent admission 10/15-10/24 with similar presentation.  CTA at the time with active bleed in colon proximal to colostomy.  IR performed successful subselective SMA and MARIE arteriography without evidence of discrete active bleed or pseudoaneurysm.  Then again with rectal bleed and flex sig demonstrating significant diverticular burden.  Patient required 4u pRBCs during that admission.  Plavix stopped and patient counseled to resume ASA in 10 days post discharge if no more evidence of bleed.     Patient reports has been doing well as an outpatient.  Hgb levels stabilized and has been gaining energy back.  Patient reports having resumed aspirin around 2 weeks ago.  This morning noted scant blood in ostomy that then progressively got worse.  Does note normally with dark/green stool due to iron supplement but this is very different.  Per urging from his son and home nurse, patient presented to ED for further evaluation.     In the ED, vitals initially were T97.4, HR 59, BP 88/51 (that improved to 90s/50s), SpO2 100% on RA.  Labs notable for WBC 4.79, Hgb 8.1 (last 8.3), Plts 103, Na 140, Cr 1.6 (from baseline 1), lactate 2.1 => 1.9.  CTA  prelim read without evidence of active GIB, stable partially thrombosed saccular aneurysmal dilation of R internal iliac artery, no diverticulitis, large L pleural effusion.  Patient given IV PPI and 1u pRBCs and admitted.           Past Surgical History   Procedure Laterality Date    cataract extraction right eye with implant and limbal relaxing incision Right 7/18/2024    Performed by Hayder Moses MD at  OR [facility]    Cataract extraction w/ intraocular lens implant Left     Cataract extraction with LRI and anterior vitrectomy left eye Left 11/16/2023    Performed by Hayder Moses MD at  OR [facility]    Cholecystectomy      Colostomy      COLOSTOMY LAPAROSCOPIC N/A 8/3/2022    Performed by Mat Bryant MD at [facility] [place]    Coronary artery bypass graft      DIAGNOSTIC FLEXIBLE SIGMOIDOSCOPY WITH COLLECTION OF SPECIMEN BY WASHING N/A 10/18/2024    Performed by Maine Lopez MD at [facility] [place] [facility]    FLEXIBLE SIGMOIDOSCOPY WITH BIOPSY N/A 10/23/2024    Performed by David Holcomb MD at [facility] [place] [facility]    INCISION AND DRAINAGE WITH DEBRIDMENT OF BUTTOCK, AND PERINEUM N/A 8/2/2022    Performed by Mta Bryant MD at [facility] [place] [facility] [place]    Joint replacement Left     Knee    RIGHT HEART CATH N/A 8/4/2022    Performed by Cristal Lacey MD at [facility] [place]/EP    Thyroidectomy      WASHOUT OF PERINEAL WOUND, COLONIC LAVAGE N/A 8/3/2022    Performed by Mat Bryant MD at [facility] [place]       Physical Exam    Airway   Mallampati: II   TM distance: >3 FB   Neck ROM: full  Cardiovascular - normal   Rhythm: regular   Rate: normalPulmonary - normal   clear to auscultation  Dental - normal        Anesthesia Plan    Plan: MAC    Technique: MAC     Lines and Monitors: PIV and arterial line     Airway: natural airway / supplemental oxygen        patient did not smoke on day of surgery   4 ASA  Blood Products:     Use of Blood Products Discussed: Yes     Consented to blood products  Anesthetic plan and risks discussed with: patient and adult  children  Induction:    intravenous   Postop Plan:   Patient Disposition: inpatient floor planned admission   Pain Management: IV analgesics  Comments:    Plan: Risks and benefits of MAC and general anesthesia discussed with patient/guardian. Risks discussed including but not limited to intraoperative awareness, sore throat, damage to lips/teeth, possible aspiration and need for intubation and possible conversion to general anesthesia emergently. All questions answered and patient willing to proceed.     Discussed with son that pt is high risk 2/2 multiple comorbidities including his cardiac status. Discussed possible need for GA, kamaljit and ICU admission

## 2024-11-27 NOTE — HOSPITAL COURSE
Patient interviewed and examined. Case was discussed with  HEIDI Hernandez  and I do agree with the findings and plan with additional details as below.    Subj: Today, patient seen at bedside alongside JEWELL Duron, patient notes no acute distress, agreeable to intervention as planned.    Vitals- height is 1.829 m (6') and weight is 70.5 kg (155 lb 6.8 oz). His temporal temperature is 35.6 °C (96.1 °F) (abnormal). His blood pressure is 118/69 and his pulse is 69. His respiration is 18 and oxygen saturation is 100%.   Exam  - NAD, +seen with PRBC hanging. RRR, full abdomen, Has melanotic appearing stool colostomy bag,1+ pitting edema BL lower extremity with a sheen to legs suggestive of lower albumin,  Body mass index is 21.08 kg/m².    Results from last 7 days   Lab Units 11/27/24  0254   SODIUM mEQ/L 139   POTASSIUM mEQ/L 4.2   CHLORIDE mEQ/L 109*   CO2 mEQ/L 22   BUN mg/dL 27*   CREATININE mg/dL 1.5*   GLUCOSE mg/dL 111*   CALCIUM mg/dL 8.2*       Results from last 7 days   Lab Units 11/27/24  0254   MAGNESIUM mg/dL 1.9    Results from last 7 days   Lab Units 11/27/24  1057   WBC K/uL 4.37   HEMOGLOBIN g/dL 8.9*   HEMATOCRIT % 28.0*   PLATELETS K/uL 85*          Tele no events reported.     In summary, Samy Elena is a 89 y.o. 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    He is being kept NPO for potential intervention per GI today 11/27/24. HE has macrocytic anemia and has received 1U PRBC but checking also a folate, B12 .     Plan, in brief:   #Anemia, in GI hemorrhage.  NPO, s/p 1U TIBC, ok for asa per GI, prcoedure per GI today      Remainder/Chronic as below.     50 minutes were spent with patient and or  family members collecting an (interval) history, performing a pertinent physical exam and coordinating care and decision making with other healthcare providers and specialists in this case.

## 2024-11-27 NOTE — ANESTHESIA POSTPROCEDURE EVALUATION
Patient: Samy Elena    Procedure Summary       Date: 11/27/24 Room / Location: [facility] 1 (A) / [facility]    Anesthesia Start: 1238 Anesthesia Stop: 1316    Procedures:       DIAGNOSTIC UPPER GASTROINTESTINAL ENDOSCOPY WITH COLLECTION OF SPECIMEN BY WASHING (Esophagus)      DIAGNOSTIC FLEXIBLE SIGMOIDOSCOPY WITH COLLECTION OF SPECIMEN BY WASHING (Anus) Diagnosis:       Gastrointestinal hemorrhage, unspecified gastrointestinal hemorrhage type      (Gastrointestinal hemorrhage, unspecified gastrointestinal hemorrhage type [K92.2])    Providers: David Holcomb MD Responsible Provider: Shilpi Powell MD    Anesthesia Type: MAC ASA Status: 4            Anesthesia Type: MAC  PACU Vitals  11/27/2024 1311 - 11/27/2024 1411        11/27/2024  1313 11/27/2024  1320 11/27/2024  1330 11/27/2024  1340    BP: 119/59 116/60 112/60 122/60    Pulse: 74 73 74 72    Resp: 24 12 24 15    SpO2: 100 % 100 % 97 % 98 %              Anesthesia Post Evaluation    Pain management: adequate  Patient location during evaluation: PACU  Patient participation: complete - patient participated  Level of consciousness: awake and alert  Cardiovascular status: acceptable  Airway Patency: adequate  Respiratory status: acceptable  Hydration status: acceptable  Anesthetic complications: no

## 2024-11-27 NOTE — ASSESSMENT & PLAN NOTE
Noted on imaging this admission.  Satting well without respiratory symptoms.  Consider thoracentesis prn.

## 2024-11-28 VITALS
OXYGEN SATURATION: 96 % | DIASTOLIC BLOOD PRESSURE: 70 MMHG | RESPIRATION RATE: 18 BRPM | HEIGHT: 72 IN | WEIGHT: 155.42 LBS | SYSTOLIC BLOOD PRESSURE: 112 MMHG | TEMPERATURE: 98.7 F | HEART RATE: 70 BPM | BODY MASS INDEX: 21.05 KG/M2

## 2024-11-28 LAB
ANION GAP SERPL CALC-SCNC: 8 MEQ/L (ref 3–15)
BUN SERPL-MCNC: 26 MG/DL (ref 7–25)
CALCIUM SERPL-MCNC: 7.9 MG/DL (ref 8.6–10.3)
CHLORIDE SERPL-SCNC: 109 MEQ/L (ref 98–107)
CO2 SERPL-SCNC: 23 MEQ/L (ref 21–31)
CREAT SERPL-MCNC: 1.4 MG/DL (ref 0.7–1.3)
CROSSMATCH: NORMAL
CROSSMATCH: NORMAL
EGFRCR SERPLBLD CKD-EPI 2021: 46.9 ML/MIN/1.73M*2
ERYTHROCYTE [DISTWIDTH] IN BLOOD BY AUTOMATED COUNT: 21.1 % (ref 11.6–14.4)
FOLATE SERPL-MCNC: 12.3 NG/ML
GLUCOSE SERPL-MCNC: 88 MG/DL (ref 70–99)
HCT VFR BLD AUTO: 27.6 % (ref 40.1–51)
HGB BLD-MCNC: 8.8 G/DL (ref 13.7–17.5)
ISBT CODE: 5100
ISBT CODE: 9500
MAGNESIUM SERPL-MCNC: 1.8 MG/DL (ref 1.8–2.5)
MCH RBC QN AUTO: 32.2 PG (ref 28–33.2)
MCHC RBC AUTO-ENTMCNC: 31.9 G/DL (ref 32.2–36.5)
MCV RBC AUTO: 101.1 FL (ref 83–98)
PLATELET # BLD AUTO: 81 K/UL (ref 150–350)
PMV BLD AUTO: 11.2 FL (ref 9.4–12.4)
POTASSIUM SERPL-SCNC: 4 MEQ/L (ref 3.5–5.1)
PRODUCT CODE: NORMAL
PRODUCT CODE: NORMAL
PRODUCT STATUS: NORMAL
PRODUCT STATUS: NORMAL
RBC # BLD AUTO: 2.73 M/UL (ref 4.5–5.8)
SODIUM SERPL-SCNC: 140 MEQ/L (ref 136–145)
SPECIMEN EXP DATE BLD: NORMAL
SPECIMEN EXP DATE BLD: NORMAL
UNIT ABO: NORMAL
UNIT ABO: NORMAL
UNIT ID: NORMAL
UNIT ID: NORMAL
UNIT RH: NEGATIVE
UNIT RH: POSITIVE
VIT B12 SERPL-MCNC: 769 PG/ML (ref 180–914)
WBC # BLD AUTO: 3.87 K/UL (ref 3.8–10.5)

## 2024-11-28 PROCEDURE — 63700000 HC SELF-ADMINISTRABLE DRUG

## 2024-11-28 PROCEDURE — 80048 BASIC METABOLIC PNL TOTAL CA: CPT

## 2024-11-28 PROCEDURE — 36415 COLL VENOUS BLD VENIPUNCTURE: CPT

## 2024-11-28 PROCEDURE — 63600000 HC DRUGS/DETAIL CODE: Mod: JZ

## 2024-11-28 PROCEDURE — 82607 VITAMIN B-12: CPT | Performed by: HOSPITALIST

## 2024-11-28 PROCEDURE — 85027 COMPLETE CBC AUTOMATED: CPT | Performed by: NURSE PRACTITIONER

## 2024-11-28 PROCEDURE — 83735 ASSAY OF MAGNESIUM: CPT

## 2024-11-28 PROCEDURE — 82746 ASSAY OF FOLIC ACID SERUM: CPT | Performed by: HOSPITALIST

## 2024-11-28 PROCEDURE — 99239 HOSP IP/OBS DSCHRG MGMT >30: CPT | Performed by: INTERNAL MEDICINE

## 2024-11-28 RX ADMIN — TAMSULOSIN HYDROCHLORIDE 0.4 MG: 0.4 CAPSULE ORAL at 09:40

## 2024-11-28 RX ADMIN — PANTOPRAZOLE SODIUM 40 MG: 40 INJECTION, POWDER, FOR SOLUTION INTRAVENOUS at 09:40

## 2024-11-28 RX ADMIN — THIAMINE HCL TAB 100 MG 100 MG: 100 TAB at 09:40

## 2024-11-28 RX ADMIN — Medication 500 MCG: at 09:40

## 2024-11-28 RX ADMIN — LEVOTHYROXINE SODIUM 100 MCG: 0.1 TABLET ORAL at 04:33

## 2024-11-28 RX ADMIN — FINASTERIDE 5 MG: 5 TABLET, FILM COATED ORAL at 09:40

## 2024-11-28 RX ADMIN — FERROUS SULFATE 300 MG: 300 SOLUTION ORAL at 09:40

## 2024-11-28 RX ADMIN — ESCITALOPRAM OXALATE 5 MG: 5 TABLET, FILM COATED ORAL at 09:40

## 2024-11-28 ASSESSMENT — COGNITIVE AND FUNCTIONAL STATUS - GENERAL
WALKING IN HOSPITAL ROOM: 1 - TOTAL
STANDING UP FROM CHAIR USING ARMS: 1 - TOTAL
CLIMB 3 TO 5 STEPS WITH RAILING: 1 - TOTAL
MOVING TO AND FROM BED TO CHAIR: 1 - TOTAL

## 2024-11-28 NOTE — NURSING NOTE
Discharge instructions reviewed w/ pt. All questions answered. IV and tele removed. All belongings w/ pt.

## 2024-11-28 NOTE — ASSESSMENT & PLAN NOTE
No signs of decompensation at this point.  Trend volume exam.   - Hold metop and torsemide given acute GIB  - Resume as able

## 2024-11-28 NOTE — PLAN OF CARE
Problem: Adult Inpatient Plan of Care  Goal: Plan of Care Review  Outcome: Progressing  Flowsheets (Taken 11/28/2024 0502)  Progress: improving  Outcome Evaluation: AAo4. VSS. Pt denies pain or SOB. Colostomy maintained . pt needs are met, FSP maintained and call bell in reach.  Plan of Care Reviewed With: patient   Plan of Care Review  Plan of Care Reviewed With: patient  Progress: improving  Outcome Evaluation: AAo4. VSS. Pt denies pain or SOB. Colostomy maintained . pt needs are met, FSP maintained and call bell in reach.

## 2024-11-28 NOTE — DISCHARGE INSTRUCTIONS
Mr Elena,   You were admitted to the hospital with concerns for blood in your stool. You were seen by the GI team and underwent an EGD as well as a flex sig through your ostomy, there was no acute source of bleeding found  You did require 2 units of blood during this hospitalization and your counts have now stabilized, there is no evidence of further blood in your stool at this time     Please follow up with your PCP within a week of discharge    If there is concern for recurrent bleeding please present to the nearest emergency department    It was a pleasure caring for you here at [facility], happy thanksgiving!

## 2024-11-28 NOTE — ASSESSMENT & PLAN NOTE
Recurrent GI bleed with melena.  Most likely similar presentation as prior - possible diverticular bleed.  Hemodynamically stable, Hgb stable compared to prior although may have not equilibrated.  Less likely new upper GIB but will maintain on PPI for now.  - GI consulted, appreciate recs  - s/p 2 units of blood  - EGD and flex sig via ostomy without evidence of scute bleeding   - ok to remain on ASA

## 2024-11-28 NOTE — DISCHARGE SUMMARY
Hospital Medicine    Inpatient Discharge Summary        BRIEF OVERVIEW     Patient: Samy Elena  Admission Date: 2024   : 10/6/1930 Discharge Date: 2024       PCP: Zachery Hernandez MD  Disposition: Home     Destination:       Attending Provider: No att. providers found Attending phys phone: N/A    Code Status At Discharge: Full Code        ASSESSMENT AND PLAN     Assessment & Plan  Gastrointestinal hemorrhage, unspecified gastrointestinal hemorrhage type  Recurrent GI bleed with melena.  Most likely similar presentation as prior - possible diverticular bleed.  Hemodynamically stable, Hgb stable compared to prior although may have not equilibrated.  Less likely new upper GIB but will maintain on PPI for now.  - GI consulted, appreciate recs  - s/p 2 units of blood  - EGD and flex sig via ostomy without evidence of scute bleeding   - ok to remain on ASA   Ischemic congestive cardiomyopathy (CMS/HCC)  No signs of decompensation at this point.  Trend volume exam.   - Hold metop and torsemide given acute GIB  - Resume as able    CALI (acute kidney injury) (CMS/HCC)  ?CALI on CKD with Cr 1.6 from 1 on last admission but previously baseline 1.4-1.6.  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  Wound ostomy consult.  History of recurrent UTIs  No urinary symptoms currently.  Hx of ESBL - contact precautions.  Holding home suppressive antibiotic due to CALI, resume when able  Thrombocytopenia  (CMS/HCC)  Stable, chronic, trend CBC  Recurrent pleural effusion on left  Noted on imaging this admission.  Satting well without respiratory symptoms.  Consider thoracentesis prn.      Brief Hospital Course  This is a 89 y.o. year-old male admitted on 11/26/2024 with Gastrointestinal hemorrhage, unspecified gastrointestinal hemorrhage type.    This is a 89-year-old male with a past medical history of heart failure with moderately reduced ejection fraction, torrential TR, recurrent left pleural effusion, dysphagia, anemia, atrial fibrillation not on anticoagulation due to history of GI bleed, CAD status post CABG, hypertension, hypothyroidism who presented to the hospital with melena from his ostomy    Patient required 2 units of blood during this hospitalization  He was seen by GI he underwent an EGD as well as a flex sig via his ostomy  EGD revealed normal esophagus normal stomach and duodenum  Flex sig revealed solid brown stool throughout with diverticulosis, no red blood or melena was seen  Hemoglobin stabilized  He feels well, he is tolerating food and eager for discharge home to spend Thanksgiving with his family    Okay to resume his home medications  If patient has recurrent bleeding would consider discontinuing aspirin long-term    Patient was told to follow-up with his PCP within 1 week  He will follow-up with his cardiologist as scheduled    Patient was in agreement with the discharge plan  Total discharge time 40 minutes    Exam on Day of Discharge  Temp:  [36.4 °C (97.5 °F)-37.1 °C (98.7 °F)] 37.1 °C (98.7 °F)  Heart Rate:  [63-78] 70  Resp:  [12-24] 18  BP: ()/(57-70) 112/70    Physical Exam  General: Very pleasant elderly male seen sitting up in bed, he does not appear in acute distress  HEENT: Normocephalic atraumatic, pupils equal round reactive to light, moist oral mucosa  Heart: S1, S2 present, sounds regular  Lungs: Clear to auscultation bilaterally  Abdomen: Soft, no tenderness, ostomy in  place, dark soft stool noted in the bag  Extremities: No edema  Neuro: Patient is awake and alert, oriented x 3, no focal deficits  Psych: Normal affect and mood    DISCHARGE MEDICATIONS         Medication List        CONTINUE taking these medications      aspirin 81 mg enteric coated tablet  Take 81 mg by mouth daily.  Dose: 81 mg     calcium (as carbonate) 200 mg calcium (500 mg) chewable tablet  Commonly known as: TUMS  Take 1 tablet by mouth daily.  Dose: 1 tablet     carboxymethylcellulose 0.5 % dropperette  Commonly known as: REFRESH PLUS  Administer 1 drop into both eyes every2 (two) hours while awake.  Dose: 1 drop     cyanocobalamin 500 mcg tablet  Commonly known as: VITAMIN B12  Take 500 mcg by mouth every morning.  Dose: 500 mcg     escitalopram 5 mg tablet  Commonly known as: LEXAPRO  Take 5 mg by mouth every morning.  Dose: 5 mg     famotidine 10 mg tablet  Commonly known as: PEPCID  Take 1 tablet (10 mg total) by mouth daily Indications: gastroesophageal reflux disease.  Dose: 10 mg     ferrous sulfate 325 mg (65 mg iron) tablet  Take 65 mg by mouth daily with breakfast.  Dose: 65 mg     finasteride 5 mg tablet  Commonly known as: PROSCAR  Take 1 tablet (5 mg total) by mouth daily.  Dose: 5 mg     levothyroxine 100 mcg tablet  Commonly known as: SYNTHROID  Take 100 mcg by mouth daily.  Dose: 100 mcg     LUMIGAN 0.01 % ophthalmic drops  Administer 1 drop into the left eye nightly.  Dose: 1 drop  Generic drug: bimatoprost     melatonin 10 mg capsule  Take 10 mg by mouth nightly.  Dose: 10 mg     methenamine 1 gram tablet  Commonly known as: HIPREX  Take 1 g by mouth daily before lunch.  Dose: 1 g     metoprolol succinate XL 25 mg 24 hr tablet  Commonly known as: TOPROL-XL  Take 12.5 mg by mouth daily with dinner.  Dose: 12.5 mg     omeprazole 20 mg capsule  Commonly known as: PriLOSEC  Take 20 mg by mouth daily before breakfast.  Dose: 20 mg     potassium chloride 10 mEq CR tablet  Commonly known as:  "KLOR-CON  Take 10 mEq by mouth 2 (two) times a day.  Dose: 10 mEq     tamsulosin 0.4 mg capsule  Commonly known as: FLOMAX  Take 1 capsule (0.4 mg total) by mouth daily.  Dose: 0.4 mg     thiamine HCl 100 mg tablet  Commonly known as: VITAMIN B1  Take 100 mg by mouth daily.  Dose: 100 mg     torsemide 10 mg tablet  Commonly known as: DEMADEX  Take 10 mg by mouth daily.  Dose: 10 mg             INSTRUCTIONS AND FOLLOW-UP     Instructions for after discharge       Call provider for:  difficulty breathing      Call provider for:  extreme fatigue      Call provider for:  persistent dizziness or light-headedness, headache, or visual disturbances      Call provider for:  persistent nausea or vomiting      Call provider for:  severe uncontrolled pain      Call provider for:  temperature >100.4      Discharge Diet      Diet Type / Texture: Regular    Normal Activity as Tolerated      Review additional activity direction in \"instructions\" section              Important Issues to Address in Follow-Up  Follow up with PCP and cardiology as scheduled  Follow up with surgery     Scheduled Appointments            In 2 months Mat Bryant MD [facility]            Recommended Follow-up Visits  Follow-Up After Discharge       Zachery Hernandez MD   Specialty: Internal Medicine, Primary Care   Relationship: PCP - General        Follow up in 1 week(s)            Test Results Pending at Discharge      LABS AND IMAGING   Pertinent Labs  CHEMISTRIES   Results from last 7 days   Lab Units 11/28/24  0611 11/27/24  0254 11/26/24  2117 11/26/24 2117   SODIUM mEQ/L 140 139  --  140   CHLORIDE mEQ/L 109* 109*  --  108*   CO2 mEQ/L 23 22  --  24   BUN mg/dL 26* 27*  --  26*   CREATININE mg/dL 1.4* 1.5*  --  1.6*   GLUCOSE mg/dL 88 111*  --  135*   CALCIUM mg/dL 7.9* 8.2*  --  8.4*   EGFR mL/min/1.73m*2 46.9* 43.1*  --  39.9*   ANION GAP mEQ/L 8 8  --  8   MAGNESIUM mg/dL 1.8 1.9   < >  --     < > = values in " this interval not displayed.     CBC RESULTS   Results from last 7 days   Lab Units 11/28/24  0612 11/27/24 2054 11/27/24  1057 11/27/24  0254 11/26/24  2117   WBC K/uL 3.87 4.19 4.37 4.33 4.79   HEMOGLOBIN g/dL 8.8* 8.7* 8.9* 7.9* 8.1*   HEMATOCRIT % 27.6* 27.4* 28.0* 24.5* 26.1*   PLATELETS K/uL 81* 82* 85* 89* 103*       Pertinent Imaging  CT ANGIOGRAPHY ABDOMEN PELVIS WITH AND WITHOUT IV CONTRAST    Result Date: 11/27/2024  IMPRESSION: No intraluminal GI bleed is demonstrated. There is extensive diverticulosis particularly at the sigmoid colon where there are radiodense diverticula and nonspecific wall prominence. Urinary bladder wall prominence may reflect urinary outlet obstruction given an enlarged prostate impinging on the base of the bladder. Marked atherosclerotic vascular calcification is again demonstrated. There is a saccular aneurysm, somewhat thrombosed at the right internal iliac artery as before. There is enlarged right atrium with reflux of contrast material into the inferior vena cava suggesting right heart failure, correlate clinically. Bilateral pleural effusions are demonstrated. See comment for delineation of findings. Results were communicated to the clinical service at the time of the study by Dr. Wilson, the radiologist on-call.    Cardiac Imaging    TRANSTHORACIC ECHO (TTE) COMPLETE 09/24/2024    Interpretation Summary  Technically difficult study.    The left ventricular cavity size is small with mild left ventricular hypertrophy and mildly reduced systolic function. Estimated EF 40-45% by visual estimate. Abnormal septal motion due to RV volume overload. Unable to assess diastolic function due to technically difficult study. Low stroke volume (32 ml).    The aortic valve is tricuspid. Aortic valve and root sclerosis. Mild aortic regurgitation.    The mitral valve is thickened. Mild mitral annular calcification. Trace regurgitation.    The left atrium is normal in size.    The right  ventricular cavity is massively dilated with severely decreased systolic function. Apical hypercontractility.    The pulmonic valve is not well seen. There is trace pulmonic regurgitation.    There is a 13 mm coaptation gap between the leaflets of the tricuspid valve resulting in torrential ( wide -open) tricuspid regurgitation (PISA EROA 2.4cm2, Rvol 96 mL). Tricuspid valve annulus is dilated and measures 6.8 cm in diameter. Unable to assess RVSP in the setting of torrential tricuspid regurgitation.    The right atrium is severely dilated.    The IVC is massively dilated and collapses less than 50% with inspiration consistent with severely elevated right atrial pressure.    No pericardial effusion. Large pleural effusion.    Compared to previous echocardiogram from 3/28/2024, no significant change.      OTHER SERVICES PROVIDED   Consults During Admission  IP CONSULT TO GASTROENTEROLOGY  IP CONSULT TO WOUND OSTOMY CONTINENCE    Procedures Performed        Thank you for allowing the Division of Hospital Medicine to care for your patient.

## 2024-12-17 ENCOUNTER — HOSPITAL ENCOUNTER (OUTPATIENT)
Dept: RADIOLOGY | Facility: HOSPITAL | Age: 89
Discharge: HOME | End: 2024-12-17
Attending: PHYSICIAN ASSISTANT | Admitting: RADIOLOGY
Payer: MEDICARE

## 2024-12-17 ENCOUNTER — APPOINTMENT (OUTPATIENT)
Dept: LAB | Facility: HOSPITAL | Age: 89
End: 2024-12-17
Attending: NURSE PRACTITIONER
Payer: MEDICARE

## 2024-12-17 ENCOUNTER — APPOINTMENT (OUTPATIENT)
Dept: RADIOLOGY | Facility: HOSPITAL | Age: 89
End: 2024-12-17
Attending: PHYSICIAN ASSISTANT
Payer: MEDICARE

## 2024-12-17 VITALS
OXYGEN SATURATION: 99 % | HEART RATE: 58 BPM | RESPIRATION RATE: 20 BRPM | DIASTOLIC BLOOD PRESSURE: 64 MMHG | SYSTOLIC BLOOD PRESSURE: 118 MMHG

## 2024-12-17 DIAGNOSIS — I50.43 ACUTE ON CHRONIC COMBINED SYSTOLIC AND DIASTOLIC CONGESTIVE HEART FAILURE (CMS/HCC): ICD-10-CM

## 2024-12-17 DIAGNOSIS — J90 PLEURAL EFFUSION: ICD-10-CM

## 2024-12-17 DIAGNOSIS — N18.30 ANEMIA DUE TO STAGE 3 CHRONIC KIDNEY DISEASE, UNSPECIFIED WHETHER STAGE 3A OR 3B CKD (CMS/HCC): ICD-10-CM

## 2024-12-17 DIAGNOSIS — E83.42 HYPOMAGNESEMIA: ICD-10-CM

## 2024-12-17 DIAGNOSIS — N39.0 URINARY TRACT INFECTION WITHOUT HEMATURIA, SITE UNSPECIFIED: ICD-10-CM

## 2024-12-17 DIAGNOSIS — D63.1 ANEMIA DUE TO STAGE 3 CHRONIC KIDNEY DISEASE, UNSPECIFIED WHETHER STAGE 3A OR 3B CKD (CMS/HCC): ICD-10-CM

## 2024-12-17 LAB
ANION GAP SERPL CALC-SCNC: 4 MEQ/L (ref 3–15)
BUN SERPL-MCNC: 28 MG/DL (ref 7–25)
CALCIUM SERPL-MCNC: 8.4 MG/DL (ref 8.6–10.3)
CHLORIDE SERPL-SCNC: 109 MEQ/L (ref 98–107)
CO2 SERPL-SCNC: 26 MEQ/L (ref 21–31)
CREAT SERPL-MCNC: 1.4 MG/DL (ref 0.7–1.3)
EGFRCR SERPLBLD CKD-EPI 2021: 46.9 ML/MIN/1.73M*2
ERYTHROCYTE [DISTWIDTH] IN BLOOD BY AUTOMATED COUNT: 19.9 % (ref 11.6–14.4)
GLUCOSE SERPL-MCNC: 164 MG/DL (ref 70–99)
HCT VFR BLD AUTO: 27.6 % (ref 40.1–51)
HGB BLD-MCNC: 8.5 G/DL (ref 13.7–17.5)
MAGNESIUM SERPL-MCNC: 2 MG/DL (ref 1.8–2.5)
MCH RBC QN AUTO: 33.2 PG (ref 28–33.2)
MCHC RBC AUTO-ENTMCNC: 30.8 G/DL (ref 32.2–36.5)
MCV RBC AUTO: 107.8 FL (ref 83–98)
PLATELET # BLD AUTO: 98 K/UL (ref 150–350)
PMV BLD AUTO: 10.8 FL (ref 9.4–12.4)
POTASSIUM SERPL-SCNC: 4.3 MEQ/L (ref 3.5–5.1)
RBC # BLD AUTO: 2.56 M/UL (ref 4.5–5.8)
SODIUM SERPL-SCNC: 139 MEQ/L (ref 136–145)
WBC # BLD AUTO: 4.39 K/UL (ref 3.8–10.5)

## 2024-12-17 PROCEDURE — 25000000 HC PHARMACY GENERAL: Performed by: PHYSICIAN ASSISTANT

## 2024-12-17 PROCEDURE — C1729 CATH, DRAINAGE: HCPCS

## 2024-12-17 PROCEDURE — 71045 X-RAY EXAM CHEST 1 VIEW: CPT

## 2024-12-17 PROCEDURE — 0W9B30Z DRAINAGE OF LEFT PLEURAL CAVITY WITH DRAINAGE DEVICE, PERCUTANEOUS APPROACH: ICD-10-PCS | Performed by: RADIOLOGY

## 2024-12-17 PROCEDURE — 85027 COMPLETE CBC AUTOMATED: CPT

## 2024-12-17 PROCEDURE — 83735 ASSAY OF MAGNESIUM: CPT

## 2024-12-17 PROCEDURE — 36100330 IR THORACENTESIS

## 2024-12-17 PROCEDURE — 36415 COLL VENOUS BLD VENIPUNCTURE: CPT

## 2024-12-17 PROCEDURE — 82310 ASSAY OF CALCIUM: CPT

## 2024-12-17 PROCEDURE — BB4BZZZ ULTRASONOGRAPHY OF PLEURA: ICD-10-PCS | Performed by: RADIOLOGY

## 2024-12-17 RX ORDER — LIDOCAINE HYDROCHLORIDE 10 MG/ML
INJECTION, SOLUTION INFILTRATION; PERINEURAL
Status: COMPLETED | OUTPATIENT
Start: 2024-12-17 | End: 2024-12-17

## 2024-12-17 RX ADMIN — LIDOCAINE HYDROCHLORIDE 10 ML: 10 INJECTION, SOLUTION INFILTRATION; PERINEURAL at 11:35

## 2024-12-17 NOTE — H&P
Interventional Radiology Abbreviated Preprocedure H&P    History: Patient is a 89 y.o. male who presents for L therapeutic thoracentesis. +cough and sob.     Physical Exam: Awake, alert, no acute distress. No resp distress on RA.     Labs: None relevant     Imaging: Reviewed    Assessment/Plan: Procedure/risks discussed. Consent obtained.  Plan to proceed with L thoracentesis today.

## 2024-12-17 NOTE — NURSING NOTE
Left thoracentesis 850 cc pleural fluid removed.  Bandaid to site. No c/o's of pain.  Awaiting chest xray

## 2024-12-17 NOTE — DISCHARGE INSTRUCTIONS
THORACENTESIS    DISCHARGE INSTRUCTIONS  You have had a thoracentesis, or the fluid removed from around your lung.  You may resume your normal diet.  You may resume your normal medications.   Do not drive, engage in heavy lifting or strenuous activity or drink any alcoholic beverages for the next 24 hours.  You may resume normal activity in 24 hours, as tolerated.  Keep the area covered and dry for the next 24 hours. Do not submerge the area in water (i.e. tub baths, hot tubs or swimming pools for 5 days). You may shower.   It is normal to experience some pain at the site over the next few days. You may take Tylenol for that pain.  Notify your primary physician and/or Interventional Radiologist IMMEDIATELY if any of the following occur:  · Fever of 101° F (38 ° C) or chills  · Swelling and or redness in the area of the puncture site  · Worsening pain  · Lightheadedness or dizziness upon standing  - Worsening shortness of breath  - Sudden severe chest pain.  Physician Contact Information  If you have a problem that you believe requires immediate attention, you should go to the Emergency Room, either at [facility] or the closest hospital. If you believe that your problem can safely wait until you speak to a physician, you should contact your doctor or Interventional Radiologist.   It is usually easier to contact your own physician by calling the office, or after hours through the answering service. If you do not know the number, the hospital  can connect you by calling [phone].   If you have concerns that need to be answered by the Interventional Radiologist, you can reach him/ her as follows:   During regular weekday hours (8AM to 5PM), call [phone] and ask the technologist to connect you with the Interventional Radiologist.   During off hours for emergencies call [phone] and ask the hospital  to contact the Interventional Radiologist on-call.    [facility] strongly recommends that you visit a Primary Care Provider (PCP) regularly. Your PCP can help you implement the recommendations we gave you today, coordinate care among your specialists, as well as make sure you are up to date with wellness exams, immunizations and preventive screenings. Your PCP can also help when you are feeling sick, potentially avoiding the need for urgent care or emergency department visits. For these reasons, it is important that you follow up with your PCP at least annually or more often based upon your medical conditions. If you do not have a PCP, please call [phone]) or go to Find a Doctor for help with finding one.

## 2024-12-17 NOTE — OR SURGEON
Pre-Procedure patient identification:  I am the primary operating surgeon/proceduralist and I have reviewed the applicable pathology reports and radiology studies for this procedure. I have identified the patient on 12/17/24 at 11:47 AM SOPHIA Peraza

## 2024-12-30 ENCOUNTER — APPOINTMENT (EMERGENCY)
Dept: RADIOLOGY | Facility: HOSPITAL | Age: 89
DRG: 378 | End: 2024-12-30
Attending: EMERGENCY MEDICINE
Payer: MEDICARE

## 2024-12-30 ENCOUNTER — HOSPITAL ENCOUNTER (INPATIENT)
Facility: HOSPITAL | Age: 89
LOS: 1 days | Discharge: HOME HEALTH | DRG: 378 | End: 2025-01-01
Attending: EMERGENCY MEDICINE | Admitting: HOSPITALIST
Payer: MEDICARE

## 2024-12-30 DIAGNOSIS — K92.2 GASTROINTESTINAL HEMORRHAGE, UNSPECIFIED GASTROINTESTINAL HEMORRHAGE TYPE: Primary | ICD-10-CM

## 2024-12-30 DIAGNOSIS — Z91.89 AT RISK FOR PROLONGED QT INTERVAL SYNDROME: ICD-10-CM

## 2024-12-30 LAB
ABO + RH BLD: NORMAL
ALBUMIN SERPL-MCNC: 3 G/DL (ref 3.5–5.7)
ALP SERPL-CCNC: 85 IU/L (ref 34–125)
ALT SERPL-CCNC: 5 IU/L (ref 7–52)
ANION GAP SERPL CALC-SCNC: 8 MEQ/L (ref 3–15)
ANISOCYTOSIS BLD QL SMEAR: ABNORMAL
APTT PPP: 32 SEC (ref 23–35)
AST SERPL-CCNC: 33 IU/L (ref 13–39)
BASOPHILS # BLD: 0.03 K/UL (ref 0.01–0.1)
BASOPHILS NFR BLD: 0.6 %
BILIRUB SERPL-MCNC: 0.7 MG/DL (ref 0.3–1.2)
BLD GP AB SCN SERPL QL: NEGATIVE
BUN SERPL-MCNC: 29 MG/DL (ref 7–25)
BURR CELLS BLD QL SMEAR: ABNORMAL
CALCIUM SERPL-MCNC: 8.4 MG/DL (ref 8.6–10.3)
CHLORIDE SERPL-SCNC: 108 MEQ/L (ref 98–107)
CO2 SERPL-SCNC: 22 MEQ/L (ref 21–31)
CREAT SERPL-MCNC: 1.6 MG/DL (ref 0.7–1.3)
D AG BLD QL: POSITIVE
DACRYOCYTES BLD QL SMEAR: ABNORMAL
DIFFERENTIAL METHOD BLD: ABNORMAL
EGFRCR SERPLBLD CKD-EPI 2021: 39.9 ML/MIN/1.73M*2
EOSINOPHIL # BLD: 0.05 K/UL (ref 0.04–0.54)
EOSINOPHIL NFR BLD: 1 %
ERYTHROCYTE [DISTWIDTH] IN BLOOD BY AUTOMATED COUNT: 19.3 % (ref 11.6–14.4)
GLUCOSE SERPL-MCNC: 202 MG/DL (ref 70–99)
HCT VFR BLD AUTO: 27.1 % (ref 40.1–51)
HGB BLD-MCNC: 8.4 G/DL (ref 13.7–17.5)
IMM GRANULOCYTES # BLD AUTO: 0.01 K/UL (ref 0–0.08)
IMM GRANULOCYTES NFR BLD AUTO: 0.2 %
INR PPP: 1.7
LABORATORY COMMENT REPORT: NORMAL
LACTATE SERPL-SCNC: 2.4 MMOL/L (ref 0.4–2)
LACTATE SERPL-SCNC: 2.9 MMOL/L (ref 0.4–2)
LYMPHOCYTES # BLD: 0.31 K/UL (ref 1.2–3.5)
LYMPHOCYTES NFR BLD: 6.3 %
MACROCYTES BLD QL SMEAR: ABNORMAL
MCH RBC QN AUTO: 33.2 PG (ref 28–33.2)
MCHC RBC AUTO-ENTMCNC: 31 G/DL (ref 32.2–36.5)
MCV RBC AUTO: 107.1 FL (ref 83–98)
MONOCYTES # BLD: 0.55 K/UL (ref 0.3–1)
MONOCYTES NFR BLD: 11.2 %
NEUTROPHILS # BLD: 3.94 K/UL (ref 1.7–7)
NEUTS SEG NFR BLD: 80.7 %
NRBC BLD-RTO: 0 %
OVALOCYTES BLD QL SMEAR: ABNORMAL
PLAT MORPH BLD: NORMAL
PLATELET # BLD AUTO: 99 K/UL (ref 150–350)
PLATELET # BLD EST: ABNORMAL 10*3/UL
PMV BLD AUTO: 10.5 FL (ref 9.4–12.4)
POLYCHROMASIA BLD QL SMEAR: ABNORMAL
POTASSIUM SERPL-SCNC: 4.6 MEQ/L (ref 3.5–5.1)
PROT SERPL-MCNC: 7.4 G/DL (ref 6–8.2)
PROTHROMBIN TIME: 19.8 SEC (ref 12.2–14.5)
RBC # BLD AUTO: 2.53 M/UL (ref 4.5–5.8)
SODIUM SERPL-SCNC: 138 MEQ/L (ref 136–145)
SPECIMEN EXP DATE BLD: NORMAL
WBC # BLD AUTO: 4.89 K/UL (ref 3.8–10.5)

## 2024-12-30 PROCEDURE — 93005 ELECTROCARDIOGRAM TRACING: CPT

## 2024-12-30 PROCEDURE — 63600105 HC IODINE BASED CONTRAST: Mod: JZ | Performed by: EMERGENCY MEDICINE

## 2024-12-30 PROCEDURE — 86901 BLOOD TYPING SEROLOGIC RH(D): CPT

## 2024-12-30 PROCEDURE — 85610 PROTHROMBIN TIME: CPT | Performed by: EMERGENCY MEDICINE

## 2024-12-30 PROCEDURE — 86923 COMPATIBILITY TEST ELECTRIC: CPT

## 2024-12-30 PROCEDURE — 85730 THROMBOPLASTIN TIME PARTIAL: CPT | Performed by: EMERGENCY MEDICINE

## 2024-12-30 PROCEDURE — 99285 EMERGENCY DEPT VISIT HI MDM: CPT | Mod: 25

## 2024-12-30 PROCEDURE — 83605 ASSAY OF LACTIC ACID: CPT | Performed by: EMERGENCY MEDICINE

## 2024-12-30 PROCEDURE — 80053 COMPREHEN METABOLIC PANEL: CPT | Performed by: EMERGENCY MEDICINE

## 2024-12-30 PROCEDURE — 86850 RBC ANTIBODY SCREEN: CPT

## 2024-12-30 PROCEDURE — 36415 COLL VENOUS BLD VENIPUNCTURE: CPT | Performed by: EMERGENCY MEDICINE

## 2024-12-30 PROCEDURE — 85025 COMPLETE CBC W/AUTO DIFF WBC: CPT | Performed by: EMERGENCY MEDICINE

## 2024-12-30 PROCEDURE — 74174 CTA ABD&PLVS W/CONTRAST: CPT

## 2024-12-30 RX ORDER — IOPAMIDOL 755 MG/ML
100 INJECTION, SOLUTION INTRAVASCULAR
Status: COMPLETED | OUTPATIENT
Start: 2024-12-30 | End: 2024-12-30

## 2024-12-30 RX ADMIN — IOPAMIDOL 100 ML: 755 INJECTION, SOLUTION INTRAVENOUS at 22:24

## 2024-12-30 ASSESSMENT — ENCOUNTER SYMPTOMS
FEVER: 0
RHINORRHEA: 0
SHORTNESS OF BREATH: 0
ABDOMINAL PAIN: 0
DIARRHEA: 0
COUGH: 0
HEADACHES: 0
VOMITING: 0

## 2024-12-31 ENCOUNTER — APPOINTMENT (EMERGENCY)
Dept: RADIOLOGY | Facility: HOSPITAL | Age: 89
DRG: 378 | End: 2024-12-31
Attending: EMERGENCY MEDICINE
Payer: MEDICARE

## 2024-12-31 PROBLEM — D64.9 ACUTE ON CHRONIC ANEMIA: Status: ACTIVE | Noted: 2024-12-31

## 2024-12-31 PROBLEM — N18.9 ACUTE KIDNEY INJURY SUPERIMPOSED ON CKD (CMS/HCC)  (CMS/HCC): Status: ACTIVE | Noted: 2024-07-28

## 2024-12-31 LAB
ALBUMIN SERPL-MCNC: 2.8 G/DL (ref 3.5–5.7)
ALBUMIN SERPL-MCNC: 2.8 G/DL (ref 3.5–5.7)
ALP SERPL-CCNC: 74 IU/L (ref 34–125)
ALP SERPL-CCNC: 76 IU/L (ref 34–125)
ALT SERPL-CCNC: 4 IU/L (ref 7–52)
ALT SERPL-CCNC: 5 IU/L (ref 7–52)
ANION GAP SERPL CALC-SCNC: 4 MEQ/L (ref 3–15)
ANION GAP SERPL CALC-SCNC: 6 MEQ/L (ref 3–15)
ANISOCYTOSIS BLD QL SMEAR: ABNORMAL
ANISOCYTOSIS BLD QL SMEAR: ABNORMAL
APTT PPP: 33 SEC (ref 23–35)
APTT PPP: 33 SEC (ref 23–35)
AST SERPL-CCNC: 24 IU/L (ref 13–39)
AST SERPL-CCNC: 25 IU/L (ref 13–39)
BASE EXCESS BLDV CALC-SCNC: 0.2 MEQ/L
BASOPHILS # BLD: 0.03 K/UL (ref 0.01–0.1)
BASOPHILS # BLD: 0.04 K/UL (ref 0.01–0.1)
BASOPHILS NFR BLD: 0.5 %
BASOPHILS NFR BLD: 0.8 %
BILIRUB SERPL-MCNC: 0.9 MG/DL (ref 0.3–1.2)
BILIRUB SERPL-MCNC: 0.9 MG/DL (ref 0.3–1.2)
BUN SERPL-MCNC: 26 MG/DL (ref 7–25)
BUN SERPL-MCNC: 28 MG/DL (ref 7–25)
BURR CELLS BLD QL SMEAR: ABNORMAL
CALCIUM SERPL-MCNC: 8.1 MG/DL (ref 8.6–10.3)
CALCIUM SERPL-MCNC: 8.1 MG/DL (ref 8.6–10.3)
CHLORIDE SERPL-SCNC: 109 MEQ/L (ref 98–107)
CHLORIDE SERPL-SCNC: 109 MEQ/L (ref 98–107)
CO2 BLDV-SCNC: 26.7 MEQ/L (ref 22–32)
CO2 SERPL-SCNC: 24 MEQ/L (ref 21–31)
CO2 SERPL-SCNC: 25 MEQ/L (ref 21–31)
CREAT SERPL-MCNC: 1.5 MG/DL (ref 0.7–1.3)
CREAT SERPL-MCNC: 1.5 MG/DL (ref 0.7–1.3)
DACRYOCYTES BLD QL SMEAR: ABNORMAL
DACRYOCYTES BLD QL SMEAR: ABNORMAL
DIFFERENTIAL METHOD BLD: ABNORMAL
DIFFERENTIAL METHOD BLD: ABNORMAL
EGFRCR SERPLBLD CKD-EPI 2021: 43.1 ML/MIN/1.73M*2
EGFRCR SERPLBLD CKD-EPI 2021: 43.1 ML/MIN/1.73M*2
EOSINOPHIL # BLD: 0.06 K/UL (ref 0.04–0.54)
EOSINOPHIL # BLD: 0.07 K/UL (ref 0.04–0.54)
EOSINOPHIL NFR BLD: 1.1 %
EOSINOPHIL NFR BLD: 1.4 %
ERYTHROCYTE [DISTWIDTH] IN BLOOD BY AUTOMATED COUNT: 18.9 % (ref 11.6–14.4)
ERYTHROCYTE [DISTWIDTH] IN BLOOD BY AUTOMATED COUNT: 19.1 % (ref 11.6–14.4)
ERYTHROCYTE [DISTWIDTH] IN BLOOD BY AUTOMATED COUNT: 19.1 % (ref 11.6–14.4)
ERYTHROCYTE [DISTWIDTH] IN BLOOD BY AUTOMATED COUNT: 19.4 % (ref 11.6–14.4)
ERYTHROCYTE [DISTWIDTH] IN BLOOD BY AUTOMATED COUNT: 19.4 % (ref 11.6–14.4)
FERRITIN SERPL-MCNC: 38 NG/ML (ref 24–250)
FIO2 ON VENT: 21 %
FOLATE SERPL-MCNC: 19.5 NG/ML
GLUCOSE SERPL-MCNC: 104 MG/DL (ref 70–99)
GLUCOSE SERPL-MCNC: 111 MG/DL (ref 70–99)
HCO3 BLDV-SCNC: 24 MEQ/L (ref 21–28)
HCT VFR BLD AUTO: 23 % (ref 40.1–51)
HCT VFR BLD AUTO: 23.1 % (ref 40.1–51)
HCT VFR BLD AUTO: 24.5 % (ref 40.1–51)
HCT VFR BLD AUTO: 25.6 % (ref 40.1–51)
HCT VFR BLD AUTO: 27.2 % (ref 40.1–51)
HGB BLD-MCNC: 7.2 G/DL (ref 13.7–17.5)
HGB BLD-MCNC: 7.2 G/DL (ref 13.7–17.5)
HGB BLD-MCNC: 7.8 G/DL (ref 13.7–17.5)
HGB BLD-MCNC: 7.9 G/DL (ref 13.7–17.5)
HGB BLD-MCNC: 8.7 G/DL (ref 13.7–17.5)
HYPOCHROMIA BLD QL SMEAR: ABNORMAL
IMM GRANULOCYTES # BLD AUTO: 0.01 K/UL (ref 0–0.08)
IMM GRANULOCYTES # BLD AUTO: 0.01 K/UL (ref 0–0.08)
IMM GRANULOCYTES NFR BLD AUTO: 0.2 %
IMM GRANULOCYTES NFR BLD AUTO: 0.2 %
INHALED O2 CONCENTRATION: ABNORMAL %
INR PPP: 1.7
INR PPP: 1.7
IRON SATN MFR SERPL: 16 % (ref 15–45)
IRON SERPL-MCNC: 51 UG/DL (ref 35–150)
LACTATE SERPL-SCNC: 1.3 MMOL/L (ref 0.4–2)
LYMPHOCYTES # BLD: 0.45 K/UL (ref 1.2–3.5)
LYMPHOCYTES # BLD: 0.52 K/UL (ref 1.2–3.5)
LYMPHOCYTES NFR BLD: 10.2 %
LYMPHOCYTES NFR BLD: 8.1 %
MACROCYTES BLD QL SMEAR: ABNORMAL
MACROCYTES BLD QL SMEAR: ABNORMAL
MCH RBC QN AUTO: 32.7 PG (ref 28–33.2)
MCH RBC QN AUTO: 32.8 PG (ref 28–33.2)
MCH RBC QN AUTO: 32.8 PG (ref 28–33.2)
MCH RBC QN AUTO: 33 PG (ref 28–33.2)
MCH RBC QN AUTO: 33.3 PG (ref 28–33.2)
MCHC RBC AUTO-ENTMCNC: 30.9 G/DL (ref 32.2–36.5)
MCHC RBC AUTO-ENTMCNC: 31.2 G/DL (ref 32.2–36.5)
MCHC RBC AUTO-ENTMCNC: 31.3 G/DL (ref 32.2–36.5)
MCHC RBC AUTO-ENTMCNC: 31.8 G/DL (ref 32.2–36.5)
MCHC RBC AUTO-ENTMCNC: 32 G/DL (ref 32.2–36.5)
MCV RBC AUTO: 102.9 FL (ref 83–98)
MCV RBC AUTO: 104.2 FL (ref 83–98)
MCV RBC AUTO: 105 FL (ref 83–98)
MCV RBC AUTO: 105.5 FL (ref 83–98)
MCV RBC AUTO: 106.2 FL (ref 83–98)
MONOCYTES # BLD: 0.62 K/UL (ref 0.3–1)
MONOCYTES # BLD: 0.65 K/UL (ref 0.3–1)
MONOCYTES NFR BLD: 11.6 %
MONOCYTES NFR BLD: 12.2 %
NEUTROPHILS # BLD: 3.84 K/UL (ref 1.7–7)
NEUTROPHILS # BLD: 4.38 K/UL (ref 1.7–7)
NEUTS SEG NFR BLD: 75.2 %
NEUTS SEG NFR BLD: 78.5 %
NRBC BLD-RTO: 0 %
NRBC BLD-RTO: 0 %
OVALOCYTES BLD QL SMEAR: ABNORMAL
OVALOCYTES BLD QL SMEAR: ABNORMAL
PATH REV BLD -IMP: NORMAL
PCO2 BLDV: 43 MM HG (ref 41–51)
PH BLDV: 7.38 [PH] (ref 7.32–7.42)
PLAT MORPH BLD: NORMAL
PLAT MORPH BLD: NORMAL
PLATELET # BLD AUTO: 83 K/UL (ref 150–350)
PLATELET # BLD AUTO: 87 K/UL (ref 150–350)
PLATELET # BLD AUTO: 95 K/UL (ref 150–350)
PLATELET # BLD AUTO: 95 K/UL (ref 150–350)
PLATELET # BLD AUTO: 96 K/UL (ref 150–350)
PLATELET # BLD EST: ABNORMAL 10*3/UL
PLATELET # BLD EST: ABNORMAL 10*3/UL
PMV BLD AUTO: 10.1 FL (ref 9.4–12.4)
PMV BLD AUTO: 10.4 FL (ref 9.4–12.4)
PMV BLD AUTO: 10.5 FL (ref 9.4–12.4)
PMV BLD AUTO: 10.5 FL (ref 9.4–12.4)
PMV BLD AUTO: 10.6 FL (ref 9.4–12.4)
PO2 BLDV: 21 MM HG (ref 25–40)
POIKILOCYTOSIS BLD QL SMEAR: ABNORMAL
POLYCHROMASIA BLD QL SMEAR: ABNORMAL
POLYCHROMASIA BLD QL SMEAR: ABNORMAL
POTASSIUM SERPL-SCNC: 4 MEQ/L (ref 3.5–5.1)
POTASSIUM SERPL-SCNC: 4.3 MEQ/L (ref 3.5–5.1)
PROT SERPL-MCNC: 6.6 G/DL (ref 6–8.2)
PROT SERPL-MCNC: 6.6 G/DL (ref 6–8.2)
PROTHROMBIN TIME: 19.8 SEC (ref 12.2–14.5)
PROTHROMBIN TIME: 19.9 SEC (ref 12.2–14.5)
QRS DURATION: 162
QRS DURATION: 164
QT INTERVAL: 452
QT INTERVAL: 476
QTC CALCULATION(BAZETT): 525
QTC CALCULATION(BAZETT): 535
R AXIS: 73
R AXIS: 85
RBC # BLD AUTO: 2.18 M/UL (ref 4.5–5.8)
RBC # BLD AUTO: 2.2 M/UL (ref 4.5–5.8)
RBC # BLD AUTO: 2.38 M/UL (ref 4.5–5.8)
RBC # BLD AUTO: 2.41 M/UL (ref 4.5–5.8)
RBC # BLD AUTO: 2.61 M/UL (ref 4.5–5.8)
SODIUM SERPL-SCNC: 138 MEQ/L (ref 136–145)
SODIUM SERPL-SCNC: 139 MEQ/L (ref 136–145)
T WAVE AXIS: -14
T WAVE AXIS: -31
TIBC SERPL-MCNC: 315 UG/DL (ref 270–460)
UIBC SERPL-MCNC: 264 UG/DL (ref 180–360)
VENTRICULAR RATE: 76
VENTRICULAR RATE: 81
VIT B12 SERPL-MCNC: 800 PG/ML (ref 180–914)
WBC # BLD AUTO: 4.16 K/UL (ref 3.8–10.5)
WBC # BLD AUTO: 4.58 K/UL (ref 3.8–10.5)
WBC # BLD AUTO: 4.91 K/UL (ref 3.8–10.5)
WBC # BLD AUTO: 5.1 K/UL (ref 3.8–10.5)
WBC # BLD AUTO: 5.58 K/UL (ref 3.8–10.5)

## 2024-12-31 PROCEDURE — 85027 COMPLETE CBC AUTOMATED: CPT

## 2024-12-31 PROCEDURE — 200200 PR NO CHARGE: Performed by: STUDENT IN AN ORGANIZED HEALTH CARE EDUCATION/TRAINING PROGRAM

## 2024-12-31 PROCEDURE — 82728 ASSAY OF FERRITIN: CPT

## 2024-12-31 PROCEDURE — 82746 ASSAY OF FOLIC ACID SERUM: CPT

## 2024-12-31 PROCEDURE — 1123F ACP DISCUSS/DSCN MKR DOCD: CPT | Performed by: NURSE PRACTITIONER

## 2024-12-31 PROCEDURE — 71045 X-RAY EXAM CHEST 1 VIEW: CPT

## 2024-12-31 PROCEDURE — 99223 1ST HOSP IP/OBS HIGH 75: CPT | Performed by: HOSPITALIST

## 2024-12-31 PROCEDURE — 99222 1ST HOSP IP/OBS MODERATE 55: CPT | Performed by: COLON & RECTAL SURGERY

## 2024-12-31 PROCEDURE — 63600000 HC DRUGS/DETAIL CODE: Mod: JZ

## 2024-12-31 PROCEDURE — 85730 THROMBOPLASTIN TIME PARTIAL: CPT

## 2024-12-31 PROCEDURE — 80053 COMPREHEN METABOLIC PANEL: CPT

## 2024-12-31 PROCEDURE — 85610 PROTHROMBIN TIME: CPT

## 2024-12-31 PROCEDURE — 93005 ELECTROCARDIOGRAM TRACING: CPT

## 2024-12-31 PROCEDURE — P9016 RBC LEUKOCYTES REDUCED: HCPCS

## 2024-12-31 PROCEDURE — 63700000 HC SELF-ADMINISTRABLE DRUG

## 2024-12-31 PROCEDURE — 82607 VITAMIN B-12: CPT

## 2024-12-31 PROCEDURE — 21400000 HC ROOM AND CARE CCU/INTERMEDIATE

## 2024-12-31 PROCEDURE — 93010 ELECTROCARDIOGRAM REPORT: CPT | Performed by: INTERNAL MEDICINE

## 2024-12-31 PROCEDURE — 1124F ACP DISCUSS-NO DSCNMKR DOCD: CPT | Performed by: HOSPITALIST

## 2024-12-31 PROCEDURE — 83540 ASSAY OF IRON: CPT

## 2024-12-31 PROCEDURE — 82803 BLOOD GASES ANY COMBINATION: CPT

## 2024-12-31 PROCEDURE — 27200127 HC BAG COLOSTOMY

## 2024-12-31 PROCEDURE — 93010 ELECTROCARDIOGRAM REPORT: CPT | Mod: 76 | Performed by: INTERNAL MEDICINE

## 2024-12-31 PROCEDURE — 36415 COLL VENOUS BLD VENIPUNCTURE: CPT

## 2024-12-31 PROCEDURE — 85025 COMPLETE CBC W/AUTO DIFF WBC: CPT | Performed by: EMERGENCY MEDICINE

## 2024-12-31 PROCEDURE — 99223 1ST HOSP IP/OBS HIGH 75: CPT | Performed by: NURSE PRACTITIONER

## 2024-12-31 RX ORDER — FINASTERIDE 5 MG/1
5 TABLET, FILM COATED ORAL DAILY
Status: DISCONTINUED | OUTPATIENT
Start: 2024-12-31 | End: 2025-01-01 | Stop reason: HOSPADM

## 2024-12-31 RX ORDER — LATANOPROST 50 UG/ML
1 SOLUTION/ DROPS OPHTHALMIC NIGHTLY
Status: DISCONTINUED | OUTPATIENT
Start: 2024-12-31 | End: 2025-01-01 | Stop reason: HOSPADM

## 2024-12-31 RX ORDER — SODIUM CHLORIDE 9 MG/ML
5 INJECTION, SOLUTION INTRAVENOUS AS NEEDED
Status: ACTIVE | OUTPATIENT
Start: 2024-12-31 | End: 2025-01-01

## 2024-12-31 RX ORDER — LEVOTHYROXINE SODIUM 100 UG/1
100 TABLET ORAL
Status: DISCONTINUED | OUTPATIENT
Start: 2024-12-31 | End: 2025-01-01 | Stop reason: HOSPADM

## 2024-12-31 RX ORDER — IBUPROFEN 200 MG
16-32 TABLET ORAL AS NEEDED
Status: DISCONTINUED | OUTPATIENT
Start: 2024-12-31 | End: 2025-01-01 | Stop reason: HOSPADM

## 2024-12-31 RX ORDER — METHENAMINE HIPPURATE 1000 MG/1
1 TABLET ORAL
Status: DISCONTINUED | OUTPATIENT
Start: 2024-12-31 | End: 2025-01-01 | Stop reason: HOSPADM

## 2024-12-31 RX ORDER — ESCITALOPRAM OXALATE 5 MG/1
5 TABLET ORAL EVERY MORNING
Status: DISCONTINUED | OUTPATIENT
Start: 2024-12-31 | End: 2025-01-01

## 2024-12-31 RX ORDER — PANTOPRAZOLE SODIUM 40 MG/10ML
40 INJECTION, POWDER, LYOPHILIZED, FOR SOLUTION INTRAVENOUS EVERY 12 HOURS
Status: DISCONTINUED | OUTPATIENT
Start: 2024-12-31 | End: 2024-12-31

## 2024-12-31 RX ORDER — DEXTROSE 50 % IN WATER (D50W) INTRAVENOUS SYRINGE
25 AS NEEDED
Status: DISCONTINUED | OUTPATIENT
Start: 2024-12-31 | End: 2025-01-01 | Stop reason: HOSPADM

## 2024-12-31 RX ORDER — TORSEMIDE 10 MG/1
10 TABLET ORAL DAILY
Status: DISCONTINUED | OUTPATIENT
Start: 2024-12-31 | End: 2025-01-01 | Stop reason: HOSPADM

## 2024-12-31 RX ORDER — TAMSULOSIN HYDROCHLORIDE 0.4 MG/1
0.4 CAPSULE ORAL DAILY
Status: DISCONTINUED | OUTPATIENT
Start: 2024-12-31 | End: 2025-01-01

## 2024-12-31 RX ORDER — ASPIRIN 81 MG/1
81 TABLET ORAL DAILY
Status: DISCONTINUED | OUTPATIENT
Start: 2024-12-31 | End: 2025-01-01 | Stop reason: HOSPADM

## 2024-12-31 RX ORDER — ASPIRIN 81 MG/1
81 TABLET ORAL DAILY
Status: DISCONTINUED | OUTPATIENT
Start: 2024-12-31 | End: 2024-12-31

## 2024-12-31 RX ORDER — DIMETHICONE 13 MG/ML
425 LOTION TOPICAL DAILY
COMMUNITY

## 2024-12-31 RX ORDER — THIAMINE HCL 100 MG
100 TABLET ORAL DAILY
Status: DISCONTINUED | OUTPATIENT
Start: 2024-12-31 | End: 2025-01-01 | Stop reason: HOSPADM

## 2024-12-31 RX ORDER — DEXTROSE 40 %
15-30 GEL (GRAM) ORAL AS NEEDED
Status: DISCONTINUED | OUTPATIENT
Start: 2024-12-31 | End: 2025-01-01 | Stop reason: HOSPADM

## 2024-12-31 RX ORDER — PANTOPRAZOLE SODIUM 40 MG/1
40 TABLET, DELAYED RELEASE ORAL 2 TIMES DAILY
Status: DISCONTINUED | OUTPATIENT
Start: 2024-12-31 | End: 2025-01-01 | Stop reason: HOSPADM

## 2024-12-31 RX ADMIN — SODIUM CHLORIDE, POTASSIUM CHLORIDE, SODIUM LACTATE AND CALCIUM CHLORIDE 500 ML: 600; 310; 30; 20 INJECTION, SOLUTION INTRAVENOUS at 04:32

## 2024-12-31 RX ADMIN — PANTOPRAZOLE SODIUM 40 MG: 40 TABLET, DELAYED RELEASE ORAL at 21:36

## 2024-12-31 RX ADMIN — LATANOPROST 1 DROP: 50 SOLUTION OPHTHALMIC at 21:36

## 2024-12-31 RX ADMIN — PANTOPRAZOLE SODIUM 40 MG: 40 INJECTION, POWDER, FOR SOLUTION INTRAVENOUS at 09:45

## 2024-12-31 RX ADMIN — LEVOTHYROXINE SODIUM 100 MCG: 0.1 TABLET ORAL at 07:13

## 2024-12-31 RX ADMIN — METOPROLOL SUCCINATE 12.5 MG: 25 TABLET, EXTENDED RELEASE ORAL at 18:10

## 2024-12-31 RX ADMIN — THIAMINE HCL TAB 100 MG 100 MG: 100 TAB at 18:10

## 2024-12-31 RX ADMIN — ASPIRIN 81 MG: 81 TABLET, COATED ORAL at 09:45

## 2024-12-31 RX ADMIN — METHENAMINE HIPPURATE 1 G: 1 TABLET ORAL at 11:30

## 2024-12-31 RX ADMIN — FINASTERIDE 5 MG: 5 TABLET, FILM COATED ORAL at 09:45

## 2024-12-31 ASSESSMENT — COGNITIVE AND FUNCTIONAL STATUS - GENERAL
MOVING TO AND FROM BED TO CHAIR: 2 - A LOT
STANDING UP FROM CHAIR USING ARMS: 2 - A LOT
CLIMB 3 TO 5 STEPS WITH RAILING: 2 - A LOT
WALKING IN HOSPITAL ROOM: 2 - A LOT

## 2024-12-31 NOTE — ASSESSMENT & PLAN NOTE
Pt with multiple admissions for GIB as above  Baseline hemoglobin around 8/9, currently 7.9   Hx of HF so goal should be >8   Platelets 87  Likely iso of CKD, ACD verus less likely malignancy   Iron 51, Ferritin 38, Iron sat 16, TIBC 315    - blood smear to view platelets and blood cells c/f bone suppression   - vit b12 and folate

## 2024-12-31 NOTE — ASSESSMENT & PLAN NOTE
- Debility likely multifactorial in the setting of advanced age with multiple medical co-morbidities.  - Living situation prior to hospitalization:  lives at home with 24/7 care giving.   - Baseline functional status:  requires assistance with ADLs.   - Current Palliative Performance Status: 30%  - Baseline Palliative Performance Status: 40%   - Frailty   - Patient remains high risk for further deterioration and functional decline.    Plan  -likely d/c back home when stable  -could consider both palliative bridge and home based palliative NP visit

## 2024-12-31 NOTE — PLAN OF CARE
Problem: Adult Inpatient Plan of Care  Goal: Plan of Care Review  Outcome: Progressing  Flowsheets (Taken 12/31/2024 0985)  Progress: improving  Outcome Evaluation: Mr. Elena is AOx3-4, forgetful at times. Wears glasses and bilateral hearing aids. Colostomy bag changed, using urinal appropriately. Tolerating clear liquid diet. Mepilex to sacrum preventitive, healed stage 2 on sacrum. Heels elevated on pillow. Denies pain. Hemoglobin stable. Afib on the cardiac monitor. Bed alarm on, call bell within reach, contact precautions maintained.  Plan of Care Reviewed With: patient  Goal: Patient-Specific Goal (Individualized)  Outcome: Progressing  Goal: Absence of Hospital-Acquired Illness or Injury  Outcome: Progressing  Goal: Optimal Comfort and Wellbeing  Outcome: Progressing  Goal: Readiness for Transition of Care  Outcome: Progressing     Problem: Infection  Goal: Absence of Infection Signs and Symptoms  Outcome: Progressing     Problem: Skin Injury Risk Increased  Goal: Skin Health and Integrity  Outcome: Progressing     Problem: Fall Injury Risk  Goal: Absence of Fall and Fall-Related Injury  Outcome: Progressing   Plan of Care Review  Plan of Care Reviewed With: patient  Progress: improving  Outcome Evaluation: Mr. Elena is AOx3-4, forgetful at times. Wears glasses and bilateral hearing aids. Colostomy bag changed, using urinal appropriately. Tolerating clear liquid diet. Mepilex to sacrum preventitive, healed stage 2 on sacrum. Heels elevated on pillow. Denies pain. Hemoglobin stable. Afib on the cardiac monitor. Bed alarm on, call bell within reach, contact precautions maintained.

## 2024-12-31 NOTE — ASSESSMENT & PLAN NOTE
S/p CABG   On aspirin from last admit, held plavix iso of GIB     - hold aspirin given GIB, restart as able   - will need multi-disciplinary discussions regarding AP/AC

## 2024-12-31 NOTE — ASSESSMENT & PLAN NOTE
Patient presenting with bright red blood in ostomy bag  On admission, hgb 8.4  (baseline seems to be ~ 8-9 )  Last colonoscopy 10/18/24, showed diverticuli and 2 polyps  CTA with no active bleed  Admitted about 1 month ago with GI bleed, EGD and flex sig without evidence of active bleeding    Ddx recurrent diverticular bleed versus 2/2 to his stoma creation he has pockets which bleed    - F/u GI c/s  - f/u surgery c/s   - Trend CBC BID vs q8 hrs; transfuse >8 given active bleeding and hx of HF   - 1u PRBC ordered, f/u post transfusion CBC  - IV PPI BID  - NPO for now  - Two large bore IVs in place; T&S sent and consent in chart; SCDs for DVT ppx  - If large bloody BM then obtain stat CTA for potential embolization

## 2024-12-31 NOTE — ASSESSMENT & PLAN NOTE
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

## 2024-12-31 NOTE — CONSULTS
"Palliative Care Consult      This is Hospital Day: 2    Conversation/Goals of Care:  Life prolonging with exception of DNR/DNI.    Met with patient at bedside along with Kofi Charlton and Phil.    Counseled patient regarding the benefits and burdens of CPR and mechanical ventilation in the setting of current clinical condition and co-morbidities.  Discussed code status options and limitations of resuscitation, including possibility of further debility if resuscitation attempt is successful.  Also discussed that resuscitation will not reverse or cure underlying medical issues and that a chance of meaningful recovery after cardiac arrest is incredibly low.  Patient agreeable to DNR/DNI.    At this time, he is still interested in returning to the hospital for evaluation and treatment.  He is not interested in \"heroics\".      Assessment/Plan  Assessment & Plan  Palliative care by specialist  89 y.o. male with a PMH significant for GIB presented to the [facility] ED on 12/31 with the CC of bright red blood in ostomy-bag admitted with GIB.    - Code status: changed to DNR/DNI today  - Prognosis:  High risk for acute deterioration and decline  - Capacity for Medical Decision Making: intact  - Advance Directives: not on file in EMR, OOH DNR on file  - Surrogate Decision Maker:  The patient is currently able to make his own medical decisions.  He identifies his sons, Marko, Ethan, Watson and Samy as surrogate decision makers.    - Goals of care: Goals are life prolonging with exception of DNR/DNI.  He is willing to return to the hospital if needed for evaluation and treatment.  Not interested in \"heroics\".       Debility  - Debility likely multifactorial in the setting of advanced age with multiple medical co-morbidities.  - Living situation prior to hospitalization:  lives at home with 24/7 care giving.   - Baseline functional status:  requires assistance with ADLs.   - Current Palliative Performance Status: 30%  - Baseline " Palliative Performance Status: 40%   - Frailty   - Patient remains high risk for further deterioration and functional decline.    Plan  -likely d/c back home when stable  -could consider both palliative bridge and home based palliative NP visit          Reason for Consult:  Assistance with clarification of goals of care    HPI      Source: Patient, medical team and chart review.    Samy Elena is an 89 y.o. male with a PMH significant for recurrent GIB,  HFrEF (40%) with torrential TR, recurrent left pleural effusion, dysphagia, anemia, afib (not on AC d/t GIB), end colostomy d/t Justin gangrene presented to the [facility] ED on 12/31 with the CC of bright red blood in ostomy-bag admitted with GIB.    Discussed case with Drs Kofi Miller and Phil.  Palliative Care consulted to assist with GOC/code status as patient was previously DNR, currently full code.    Patient was able to provide history and articulate an understanding of his current clinical condition.  His aid noticed blood in his ostomy bag prompting hospital evaluation.  This has been a recurrent problem for him.  He also had his recurrent plural effusion drained recently.    Per primary team, GI is not currently recommending interventions.  Plans to monitor over the next 24 hours, d/c home when stable.    Patient currently without complaints.  Denies pain, sob, bleeding.      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)     Coronary artery disease     Disease of thyroid gland     Will catheter in place     6/25 not at present    GI (gastrointestinal bleed)     Hypertension     Myocardial infarction (CMS/HCC)     Orthostatic hypotension     TIA (transient  ischemic attack)        Past Surgical History  Past Surgical History   Procedure Laterality Date    cataract extraction right eye with implant and limbal relaxing incision Right 7/18/2024    Performed by Hayder Moses MD at  OR [facility]    Cataract extraction w/ intraocular lens implant Left     Cataract extraction with LRI and anterior vitrectomy left eye Left 11/16/2023    Performed by Hayder Moses MD at  OR [facility]    Cholecystectomy      Colostomy      COLOSTOMY LAPAROSCOPIC N/A 8/3/2022    Performed by Mat Bryant MD at [facility]    Coronary artery bypass graft      DIAGNOSTIC FLEXIBLE SIGMOIDOSCOPY WITH COLLECTION OF SPECIMEN BY WASHING N/A 11/27/2024    Performed by David Holcomb MD at [facility] GI    DIAGNOSTIC FLEXIBLE SIGMOIDOSCOPY WITH COLLECTION OF SPECIMEN BY WASHING N/A 10/18/2024    Performed by Maine Lopez MD at [facility]    DIAGNOSTIC UPPER GASTROINTESTINAL ENDOSCOPY WITH COLLECTION OF SPECIMEN BY WASHING N/A 11/27/2024    Performed by David Holcomb MD at [facility]    FLEXIBLE SIGMOIDOSCOPY WITH BIOPSY N/A 10/23/2024    Performed by David Holcomb MD at [facility]    INCISION AND DRAINAGE WITH DEBRIDMENT OF BUTTOCK, AND PERINEUM N/A 8/2/2022    Performed by Mat Bryant MD at [facility] OR    Joint replacement Left     Knee    RIGHT HEART CATH N/A 8/4/2022    Performed by Cristal Lacey MD at [facility] CARDIAC CATH/EP    Thyroidectomy      WASHOUT OF PERINEAL WOUND, COLONIC LAVAGE N/A 8/3/2022    Performed by Mat Bryant MD at [facility] OR         Review of Systems:  All other systems reviewed and negative except as noted in the HPI.    [place]  [employer] (Outside records) query reviewed with no concerns.    Current Medications:  Current Facility-Administered Medications   Medication Dose Route Frequency Provider Last Rate Last Admin    [Provider Managed Hold] aspirin enteric coated tablet 81 mg  81 mg oral Daily Cristina Rincon MD (Bernita)        glucose chewable  tablet 16-32 g of dextrose  16-32 g of dextrose oral PRN Cristina Rincon MD (Bernita)        Or    dextrose 40 % oral gel 15-30 g of dextrose  15-30 g of dextrose oral PRN Cristina Rnicon MD (Bernita)        Or    glucagon (GLUCAGEN) injection 1 mg  1 mg intramuscular PRN Cristina Rincon MD (Bernita)        Or    dextrose 50 % in water (D50) injection 12.5 g  25 mL intravenous PRN Cristina Rincon MD (Bernita)        [Provider Managed Hold] escitalopram (LEXAPRO) tablet 5 mg  5 mg oral q AM Cristina Rincon MD (Bernita)        finasteride (PROSCAR) tablet 5 mg  5 mg oral Daily Cristina Rincon MD (Bernita)        levothyroxine (SYNTHROID) tablet 100 mcg  100 mcg oral Daily (6:30a) Cristina Rincon MD (Bernita)   100 mcg at 12/31/24 0713    methenamine (HIPREX) tablet 1 g  1 g oral Daily before lunch Cristina Rincon MD (Bernita)        [Provider Managed Hold] metoprolol succinate ER (TOPROL-XL) pre-halved 24 hr ER tablet 12.5 mg  12.5 mg oral Daily with dinner Cristina Rincon MD (Bernita)        pantoprazole (PROTONIX) injection 40 mg  40 mg intravenous q12h IVÁN Cristina Rincon MD (Bernita)        sodium chloride 0.9 % infusion  5 mL/hr intravenous PRN Cristina Rincon MD (Bernita)        [Provider Managed Hold] tamsulosin (FLOMAX) 24 hr ER capsule 0.4 mg  0.4 mg oral Daily Cristina Rincon MD (Bernita)        [Provider Managed Hold] torsemide (DEMADEX) tablet 10 mg  10 mg oral Daily Cristina Rincon MD (Bernita)         Current Outpatient Medications   Medication Sig Dispense Refill    aspirin 81 mg enteric coated tablet Take 81 mg by mouth daily.      bimatoprost (LUMIGAN) 0.01 % ophthalmic drops Administer 1 drop into the left eye nightly.      calcium, as carbonate, (TUMS) 200 mg calcium (500 mg) chewable tablet Take 1 tablet by mouth daily.      carboxymethylcellulose (REFRESH PLUS) 0.5 % dropperette Administer 1 drop into both eyes every2 (two) hours while awake.      cyanocobalamin (VITAMIN B12) 500 mcg tablet Take  500 mcg by mouth every morning.      escitalopram (LEXAPRO) 5 mg tablet Take 5 mg by mouth every morning.      famotidine (PEPCID) 10 mg tablet Take 1 tablet (10 mg total) by mouth daily Indications: gastroesophageal reflux disease. 30 tablet 0    ferrous sulfate 325 mg (65 mg iron) tablet Take 65 mg by mouth daily with breakfast.      finasteride (PROSCAR) 5 mg tablet Take 1 tablet (5 mg total) by mouth daily. 30 tablet 0    levothyroxine (SYNTHROID) 100 mcg tablet Take 100 mcg by mouth daily.      melatonin 10 mg capsule Take 10 mg by mouth nightly.      methenamine (HIPREX) 1 gram tablet Take 1 g by mouth daily before lunch.      metoprolol succinate XL (TOPROL-XL) 25 mg 24 hr tablet Take 12.5 mg by mouth daily with dinner.      omeprazole (PriLOSEC) 20 mg capsule Take 20 mg by mouth daily before breakfast.      potassium chloride (KLOR-CON) 10 mEq CR tablet Take 10 mEq by mouth 2 (two) times a day.      tamsulosin (FLOMAX) 0.4 mg capsule Take 1 capsule (0.4 mg total) by mouth daily. 30 capsule 0    thiamine HCl (VITAMIN B1) 100 mg tablet Take 100 mg by mouth daily.      torsemide (DEMADEX) 10 mg tablet Take 10 mg by mouth daily.         Allergies:  Allergies   Allergen Reactions    Jardiance [Empagliflozin] Other (see comments)     denise's gangrene         Objective    General:   No Acute Distress, chronically ill appearing, frail  Eyes:  Sclera Anicteric  ENMT:  Mucus Membranes Moist  CV:  irregular  Respiratory:  nonlabored  GI:  stoma/ostomy bag present, brown stool  Psych:  Appropriate and Cooperative  Neuro:  Awake and Alert  Skin:  warm and dry  Musculoskeletal: no LE edema noted       Vitals:  Vitals:    12/31/24 0804   BP: 103/68   Pulse: 65   Resp: 18   Temp: 36.5 °C (97.7 °F)   SpO2:        Laboratory Studies:  CBC Results         12/31/24 12/31/24 12/30/24     0534 0426 2322    WBC 4.91 5.10 5.58    RBC 2.18 2.20 2.41    HGB 7.2 7.2 7.9    HCT 23.0 23.1 25.6    .5 105.0 106.2    MCH 33.0  32.7 32.8    MCHC 31.3 31.2 30.9    PLT 96 95 87           Comment for PLT at 2322 on 12/30/24: CONSISTENT WITH PREVIOUS RESULTS          CMP Results         12/31/24 12/31/24 12/30/24     0534 0426 2030     139 138    K 4.0 4.3 4.6    Cl 109 109 108    CO2 25 24 22    Glucose 104 111 202    BUN 26 28 29    Creatinine 1.5 1.5 1.6    Calcium 8.1 8.1 8.4    Anion Gap 4 6 8    AST 25 24 33    ALT 5 4 5    Albumin 2.8 2.8 3.0    EGFR 43.1 43.1 39.9           Comment for K at 0534 on 12/31/24: Results obtained on plasma. Plasma Potassium values may be up to 0.4 mEQ/L less than serum values. The differences may be greater for patients with high platelet or white cell counts.    Comment for K at 0426 on 12/31/24: Results obtained on plasma. Plasma Potassium values may be up to 0.4 mEQ/L less than serum values. The differences may be greater for patients with high platelet or white cell counts.    Comment for K at 2030 on 12/30/24: Results obtained on plasma. Plasma Potassium values may be up to 0.4 mEQ/L less than serum values. The differences may be greater for patients with high platelet or white cell counts.    Comment for EGFR at 0534 on 12/31/24: Calculation based on the Chronic Kidney Disease Epidemiology Collaboration (CKD-EPI) equation refit without adjustment for race.    Comment for EGFR at 0426 on 12/31/24: Calculation based on the Chronic Kidney Disease Epidemiology Collaboration (CKD-EPI) equation refit without adjustment for race.    Comment for EGFR at 2030 on 12/30/24: Calculation based on the Chronic Kidney Disease Epidemiology Collaboration (CKD-EPI) equation refit without adjustment for race.          Troponin I Results         09/24/24 09/23/24 07/28/24     0039 2211 1534    HS Troponin I 29.3 25.1 23.2          ABG Results         12/31/24 09/23/24 07/28/24     0534 2216 1534    Source Of Oxygen RA 2 l ra            I have reviewed the patient's pertinent labs to the time of note.  Significant  abnormals are Cr 1.5-CALI, hgb 7.2-anemia.    Imaging and Other Studies:     IR THORACENTESIS    Result Date: 12/17/2024  IMPRESSION: Successful ultrasound-guided left-sided thoracentesis with 850 mL pleural fluid removed and discarded. I certify that I have personally reviewed this examination and agree with Avril Loya's report. Goyo Burnham M.D.    X-RAY CHEST 1 VIEW    Result Date: 12/17/2024  IMPRESSION:  Cardiomegaly with pulmonary edema and bilateral pleural effusions.                                                                   Cardiac Imaging    TRANSTHORACIC ECHO (TTE) COMPLETE 09/24/2024    Interpretation Summary  Technically difficult study.    The left ventricular cavity size is small with mild left ventricular hypertrophy and mildly reduced systolic function. Estimated EF 40-45% by visual estimate. Abnormal septal motion due to RV volume overload. Unable to assess diastolic function due to technically difficult study. Low stroke volume (32 ml).    The aortic valve is tricuspid. Aortic valve and root sclerosis. Mild aortic regurgitation.    The mitral valve is thickened. Mild mitral annular calcification. Trace regurgitation.    The left atrium is normal in size.    The right ventricular cavity is massively dilated with severely decreased systolic function. Apical hypercontractility.    The pulmonic valve is not well seen. There is trace pulmonic regurgitation.    There is a 13 mm coaptation gap between the leaflets of the tricuspid valve resulting in torrential ( wide -open) tricuspid regurgitation (PISA EROA 2.4cm2, Rvol 96 mL). Tricuspid valve annulus is dilated and measures 6.8 cm in diameter. Unable to assess RVSP in the setting of torrential tricuspid regurgitation.    The right atrium is severely dilated.    The IVC is massively dilated and collapses less than 50% with inspiration consistent with severely elevated right atrial pressure.    No pericardial effusion. Large pleural  effusion.    Compared to previous echocardiogram from 3/28/2024, no significant change.       I have independently reviewed the pertinent imaging to the time of note and agree with reported results. CXR reviewed- no ICD noted 12/31/24      Advanced care planning: Samy does not have an ACP document on file and Samy's surrogate decision makers would be his sons Ethan Zhu Chris and Samy Wilks, JESSEE  Office [phone]

## 2024-12-31 NOTE — PROGRESS NOTES
Spoke with patient's son, Marko, and confirmed with medication list from [facility] and/or patient's own pharmacy to complete the medication reconciliation.     Prior to admission medication list:    Current Outpatient Medications:     aspirin 81 mg enteric coated tablet, Take 81 mg by mouth daily., Disp: , Rfl:     bimatoprost (LUMIGAN) 0.01 % ophthalmic drops, Administer 1 drop into the left eye nightly., Disp: , Rfl:     calcium, as carbonate, (TUMS) 200 mg calcium (500 mg) chewable tablet, Take 1 tablet by mouth daily., Disp: , Rfl:     cranberry extract 425 mg capsule, Take 425 mg by mouth daily., Disp: , Rfl:     cyanocobalamin (VITAMIN B12) 500 mcg tablet, Take 500 mcg by mouth every morning., Disp: , Rfl:     escitalopram (LEXAPRO) 10 mg tablet, Take 10 mg by mouth every morning., Disp: , Rfl:     famotidine (PEPCID) 10 mg tablet, Take 1 tablet (10 mg total) by mouth daily Indications: gastroesophageal reflux disease., Disp: 30 tablet, Rfl: 0    ferrous sulfate 325 mg (65 mg iron) tablet, Take 65 mg by mouth daily with breakfast., Disp: , Rfl:     finasteride (PROSCAR) 5 mg tablet, Take 1 tablet (5 mg total) by mouth daily., Disp: 30 tablet, Rfl: 0    levothyroxine (SYNTHROID) 100 mcg tablet, Take 100 mcg by mouth daily., Disp: , Rfl:     melatonin 10 mg capsule, Take 10 mg by mouth nightly., Disp: , Rfl:     methenamine (HIPREX) 1 gram tablet, Take 1 g by mouth daily before lunch., Disp: , Rfl:     metoprolol succinate XL (TOPROL-XL) 25 mg 24 hr tablet, Take 12.5 mg by mouth daily with dinner., Disp: , Rfl:     omeprazole (PriLOSEC) 20 mg capsule, Take 20 mg by mouth daily before breakfast., Disp: , Rfl:     potassium chloride (KLOR-CON) 10 mEq CR tablet, Take 10 mEq by mouth 2 (two) times a day., Disp: , Rfl:     tamsulosin (FLOMAX) 0.4 mg capsule, Take 1 capsule (0.4 mg total) by mouth daily., Disp: 30 capsule, Rfl: 0    thiamine HCl (VITAMIN B1) 100 mg tablet, Take 100 mg by mouth daily., Disp: , Rfl:      torsemide (DEMADEX) 10 mg tablet, Take 10 mg by mouth daily., Disp: , Rfl:     carboxymethylcellulose (REFRESH PLUS) 0.5 % dropperette, Administer 1 drop into both eyes every2 (two) hours while awake., Disp: , Rfl:      Comments about home medications:  -Provider stopped the clopidogrel and has him taking aspirin instead now, per 11/27/24 medication reconciliation.  -Patient's son states the patient takes potassium chloride 10 mEq twice daily, not 40 mEq twice daily as last prescribed.  -Patient's son states the patient takes torsemide 10 mg once daily, not 20 mg daily as last prescribed.  -Patient's son states the patient is taking escitalopram 10 mg, not the more recently prescribed 5 mg.    Compliance:   -Lumigan ophthalmic last filled 1/24/24 for 15 mL.  -Finasteride last filled 8/28/24 for a 90 day supply.  -Potassium chloride 10 mEq last filled 7/15/24 for a 30 day supply.  -All other medications filled recently, no compliance concerns.

## 2024-12-31 NOTE — CONSULTS
Gastroenterology  Consultation Note       REASON FOR CONSULT   Extensive GI bleed history, blood in stoma bag-appreciate recs    Consulting Provider: Dr. Ambrosio   HISTORY OF PRESENT ILLNESS      This is a 89 y.o. 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     Patient was admitted in October for blood from stoma.  The CTA at that time showed active bleed in the colon just proximal to the colostomy.  He went to IR where there was no active bleeding but he underwent dissection of the sigmoid artery.  His bleeding slowed and then recurred. Underwent flex sig through the stoma which was intact without any signs of ischemia or strangulation, there were many diverticula in the left colon and 2 tiny polyps which were not removed due to recent Plavix use.  No active bleeding at that time.  The following week he developed bleeding from his rectum.  He underwent flex sig via rectum which showed red blood with clots filling the rectum and sigmoid.  Blood obscured ability to see site of bleeding but presumed diverticular versus possible diversion proctitis.  His Plavix was stopped and he was discharged just on aspirin.  He was admitted again at the end of November for similar presentation.  Flex sig via ostomy showed solid brown stool throughout and diverticulosis with no red blood or melena.  Underwent EGD to rule out potential upper source given recurrent episodes which was unremarkable.  Our team's advice at the time was that he had now undergone several endoscopies without active bleeding via stoma and given his high risk cardiac disease for getting anesthesia would not recommend putting him through further endoscopic procedures unless there was felt to be therapeutic benefit.  Recommended CTA if he had large-volume  bleeding in the future.     This presentation, he says he had blood filling the bag at home prior to presentation.  This was the first time he had noted blood since his last discharge.  He has no abdominal pain, nausea, vomiting.  He does say that he had some irritation on the medial aspect of his stoma recently which sometimes oozes when he cleans it.    In the ED he was afebrile and hemodynamically stable.  Renal function at baseline.  Lactate 2.9-2.4.  Hemoglobin 8.4, 7.9, 7.2, 7.2.  INR 1.7.  Thrombocytopenia slightly worse.  Underwent CTA which showed no evidence of active contrast extravasation throughout the GI tract.    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)     Coronary artery disease     Disease of thyroid gland     Will catheter in place     6/25 not at present    GI (gastrointestinal bleed)     Hypertension     Myocardial infarction (CMS/HCC)     Orthostatic hypotension     TIA (transient ischemic attack)        PSHx:  Past Surgical History   Procedure Laterality Date    cataract extraction right eye with implant and limbal relaxing incision Right 7/18/2024    Performed by Hayder Moses MD at  OR [facility]    Cataract extraction w/ intraocular lens implant Left     Cataract extraction with LRI and anterior vitrectomy left eye Left 11/16/2023    Performed by Hayder Moses MD at  OR [facility]    Cholecystectomy      Colostomy      COLOSTOMY LAPAROSCOPIC N/A 8/3/2022    Performed by Mat Bryant MD at [facility]    Coronary artery bypass graft      DIAGNOSTIC FLEXIBLE SIGMOIDOSCOPY WITH COLLECTION OF SPECIMEN BY WASHING N/A 11/27/2024    Performed by David Holcomb MD at [facility]    DIAGNOSTIC FLEXIBLE SIGMOIDOSCOPY WITH COLLECTION OF SPECIMEN BY WASHING N/A 10/18/2024    Performed by  Maine Lopez MD at [facility]    DIAGNOSTIC UPPER GASTROINTESTINAL ENDOSCOPY WITH COLLECTION OF SPECIMEN BY WASHING N/A 11/27/2024    Performed by David Holcomb MD at [facility]    FLEXIBLE SIGMOIDOSCOPY WITH BIOPSY N/A 10/23/2024    Performed by David Holcomb MD at [facility]    INCISION AND DRAINAGE WITH DEBRIDMENT OF BUTTOCK, AND PERINEUM N/A 8/2/2022    Performed by Mat Bryant MD at [facility]    Joint replacement Left     Knee    RIGHT HEART CATH N/A 8/4/2022    Performed by Cristal Lacey MD at [facility] CARDIAC CATH/EP    Thyroidectomy      WASHOUT OF PERINEAL WOUND, COLONIC LAVAGE N/A 8/3/2022    Performed by Mat Bryant MD at [facility]       PCP:   Zachery Hernandez MD    MEDICATIONS      (Not in a hospital admission)      Home medications were personally reviewed.    ALLERGIES      Jardiance [[email or web address]]    FAMILY HISTORY      No family history on file.    SOCIAL HISTORY      Social History     Socioeconomic History    Marital status:      Spouse name: None    Number of children: None    Years of education: None    Highest education level: None   Tobacco Use    Smoking status: Never    Smokeless tobacco: Never   Vaping Use    Vaping status: Never Used   Substance and Sexual Activity    Alcohol use: Not Currently     Comment: social    Drug use: Never    Sexual activity: Defer     Social Drivers of Health     Financial Resource Strain: Low Risk  (8/4/2023)    Overall Financial Resource Strain (CARDIA)     Difficulty of Paying Living Expenses: Not hard at all   Food Insecurity: No Food Insecurity (12/30/2024)    Hunger Vital Sign     Worried About Running Out of Food in the Last Year: Never true     Ran Out of Food in the Last Year: Never true   Transportation Needs: No Transportation Needs (10/16/2024)    PRAPARE - Transportation     Lack of Transportation (Medical): No     Lack of Transportation (Non-Medical): No   Housing Stability: Low Risk  (10/16/2024)    Housing Stability Vital Sign     Unable  to Pay for Housing in the Last Year: No     Number of Times Moved in the Last Year: 0     Homeless in the Last Year: No       REVIEW OF SYSTEMS      Review of Systems  See above   PHYSICAL EXAMINATION      Temp:  [36.5 °C (97.7 °F)-36.8 °C (98.3 °F)] 36.8 °C (98.3 °F)  Heart Rate:  [65-83] 65  Resp:  [16-24] 18  BP: ()/(51-67) 101/54  Body mass index is 20.08 kg/m².    -Constitutional: alert, well-nourished, non-diaphoretic, no apparent distress  -Cardiovascular: normal rate  -Pulmonary/Chest: on room air   -Abdomen: soft, non-distended, non-tender to palpation, stoma bag with brown stool  -Skin/Extremities: dry and warm   -Neurological: Answering questions appropriately    LABS / IMAGING / EKG        Labs  Results from last 7 days   Lab Units 12/31/24  0534 12/31/24  0426 12/30/24  2322   WBC K/uL 4.91 5.10 5.58   HEMOGLOBIN g/dL 7.2* 7.2* 7.9*   HEMATOCRIT % 23.0* 23.1* 25.6*   PLATELETS K/uL 96* 95* 87*     Results from last 7 days   Lab Units 12/31/24  0534 12/31/24  0426 12/30/24  2030   SODIUM mEQ/L 138 139 138   POTASSIUM mEQ/L 4.0 4.3 4.6   CHLORIDE mEQ/L 109* 109* 108*   CO2 mEQ/L 25 24 22   BUN mg/dL 26* 28* 29*   CREATININE mg/dL 1.5* 1.5* 1.6*   CALCIUM mg/dL 8.1* 8.1* 8.4*   ALBUMIN g/dL 2.8* 2.8* 3.0*   BILIRUBIN TOTAL mg/dL 0.9 0.9 0.7   ALK PHOS IU/L 76 74 85   ALT IU/L 5* 4* 5*   AST IU/L 25 24 33   GLUCOSE mg/dL 104* 111* 202*         Imaging:  Reviewed.        ASSESSMENT AND PLAN        #Bleeding from stoma  89-year-old male with significant cardiac history and recent GI bleeding both from stoma and rectum who presents with recurrent bleeding from stoma.  CTA without active bleeding.  Hemoglobin slightly below baseline.  Undergone multiple recent procedures for evaluation of this. Flex sig on 10/18 via stoma with intact stoma, diverticulosis, no active bleeding.  Flex sig on 10/23 via rectum with blood clots throughout rectum and sigmoid with many diverticula.  11/27 EGD unremarkable.  11/27  flex sig via stoma with moderate amount of brown stool, no fresh or residual blood, diverticulosis.  Hemoglobin slightly below baseline.  Has brown stool without any blood in his ostomy bag this morning.  Suspect bleeding either diverticular or related to stomal skin irritation.     - No indication for endoscopic evaluation at this time  - Clears today, advance diet tomorrow if no further bleeding  - Transfuse to greater than 7 or greater than 8 of cardiac indication  - Maintain 2 large-bore IVs, active type and screen  - Okay for aspirin  - Maintain 2 large-bore IVs, active type and screen, consent      Final Recommendations Pending Attending Attestation     Susanna Brown DO  Gastroenterology Fellow, PGY-4  Pager [phone]    Some or all of the below documentation was generated using voice recognition/dictation software. Please note this dictation software can have anomalies in its ability to transcribe. Please excuse errors which may have occurred due to this, and do not hesitate to contact me directly for anything that seems abnormal for clarification.        VTE Assessment: Padua    Code Status: Full Code  Estimated discharge date: 1/1/2025

## 2024-12-31 NOTE — CONSULTS
"General Surgery Consult    Subjective     Samy Elena is a 89 y.o. male who was admitted for Gastrointestinal hemorrhage, unspecified gastrointestinal hemorrhage type [K92.2]. Patient was seen in consultation at the request of referring physician for management recommendations.     89 y.o. 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 Bryant, CAD s/p CABG (1996), who presents with recurrent GI bleeding.     The patient has a prior history of GI bleeding both from his end colostomy and his rectal stump. Most recents admissions in October and November 2024, both times resolved spontaneously. Per GI note:   \"The CTA at that time showed active bleed in the colon just proximal to the colostomy. He went to IR where there was no active bleeding but he underwent dissection of the sigmoid artery. His bleeding slowed and then recurred. Underwent flex sig through the stoma which was intact without any signs of ischemia or strangulation, there were many diverticula in the left colon and 2 tiny polyps which were not removed due to recent Plavix use. No active bleeding at that time. The following week he developed bleeding from his rectum. He underwent flex sig via rectum which showed red blood with clots filling the rectum and sigmoid. Blood obscured ability to see site of bleeding but presumed diverticular versus possible diversion proctitis. His Plavix was stopped and he was discharged just on aspirin. He was admitted again at the end of November for similar presentation.  Flex sig via ostomy showed solid brown stool throughout and diverticulosis with no red blood or melena.  Underwent EGD to rule out potential upper source given recurrent episodes which was unremarkable.     This " time, the patient reports filling his colostomy with bright red blood and clots twice while at home. I confirmed with his home aid that it looked like it was coming from the inside of the colostomy. He did mention some irritation of his stoma that was not seen on exam.     In the ED he was afebrile and hemodynamically stable. Renal function at baseline. Lactate 2.9-2.4. Hemoglobin 8.4, 7.9, 7.2, 7.2. INR 1.7. Thrombocytopenia slightly worse. Underwent CTA which showed no evidence of active contrast extravasation throughout the GI tract.   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)     Coronary artery disease     Disease of thyroid gland     Will catheter in place     6/25 not at present    GI (gastrointestinal bleed)     Hypertension     Myocardial infarction (CMS/HCC)     Orthostatic hypotension     TIA (transient ischemic attack)        Surgical History:   Past Surgical History   Procedure Laterality Date    cataract extraction right eye with implant and limbal relaxing incision Right 7/18/2024    Performed by Hayder Moses MD at  OR [facility]    Cataract extraction w/ intraocular lens implant Left     Cataract extraction with LRI and anterior vitrectomy left eye Left 11/16/2023    Performed by Hayder Moses MD at  OR [facility]    Cholecystectomy      Colostomy      COLOSTOMY LAPAROSCOPIC N/A 8/3/2022    Performed by Mat Bryant MD at [facility]    Coronary artery bypass graft      DIAGNOSTIC FLEXIBLE SIGMOIDOSCOPY WITH COLLECTION OF SPECIMEN BY WASHING N/A 11/27/2024    Performed by David Holcomb MD at [facility]    DIAGNOSTIC FLEXIBLE SIGMOIDOSCOPY WITH COLLECTION OF SPECIMEN BY WASHING N/A 10/18/2024    Performed by Maine Lopez MD at [facility]    DIAGNOSTIC UPPER GASTROINTESTINAL ENDOSCOPY WITH COLLECTION OF SPECIMEN  BY WASHING N/A 11/27/2024    Performed by David Holcomb MD at [facility]    FLEXIBLE SIGMOIDOSCOPY WITH BIOPSY N/A 10/23/2024    Performed by David Holcomb MD at [facility]    INCISION AND DRAINAGE WITH DEBRIDMENT OF BUTTOCK, AND PERINEUM N/A 8/2/2022    Performed by Mat Bryant MD at [facility] OR    Joint replacement Left     Knee    RIGHT HEART CATH N/A 8/4/2022    Performed by Cristal Lacey MD at [facility] CARDIAC CATH/EP    Thyroidectomy      WASHOUT OF PERINEAL WOUND, COLONIC LAVAGE N/A 8/3/2022    Performed by Mat Bryant MD at [facility] OR       Social History:   Social History     Social History Narrative    Not on file       Family History: No family history on file.    Allergies: Jardiance [[email or web address]]    Home Medications:   aspirin enteric coated tablet 81 mg  81 mg oral Daily    glucose chewable tablet 16-32 g of dextrose  16-32 g of dextrose oral PRN    Or    dextrose 40 % oral gel 15-30 g of dextrose  15-30 g of dextrose oral PRN    Or    glucagon (GLUCAGEN) injection 1 mg  1 mg intramuscular PRN    Or    dextrose 50 % in water (D50) injection 12.5 g  25 mL intravenous PRN    [Provider Managed Hold] escitalopram (LEXAPRO) tablet 5 mg  5 mg oral q AM    finasteride (PROSCAR) tablet 5 mg  5 mg oral Daily    levothyroxine (SYNTHROID) tablet 100 mcg  100 mcg oral Daily (6:30a)    methenamine (HIPREX) tablet 1 g  1 g oral Daily before lunch    metoprolol succinate ER (TOPROL-XL) pre-halved 24 hr ER tablet 12.5 mg  12.5 mg oral Daily with dinner    pantoprazole (PROTONIX) tablet,delayed release (DR/EC) 40 mg  40 mg oral BID    sodium chloride 0.9 % infusion  5 mL/hr intravenous PRN    [Provider Managed Hold] tamsulosin (FLOMAX) 24 hr ER capsule 0.4 mg  0.4 mg oral Daily    [Provider Managed Hold] torsemide (DEMADEX) tablet 10 mg  10 mg oral Daily       Current Medications:  Current Facility-Administered Medications   Medication Dose Route Frequency Provider Last Rate Last Admin    aspirin enteric coated  tablet 81 mg  81 mg oral Daily Elva Kirby DO   81 mg at 12/31/24 0945    glucose chewable tablet 16-32 g of dextrose  16-32 g of dextrose oral PRN Cristina Rincon MD (Bernita)        Or    dextrose 40 % oral gel 15-30 g of dextrose  15-30 g of dextrose oral PRN Cristina Rincon MD (Bernita)        Or    glucagon (GLUCAGEN) injection 1 mg  1 mg intramuscular PRN Cristina Rincon MD (Bernita)        Or    dextrose 50 % in water (D50) injection 12.5 g  25 mL intravenous PRN Cristina Rincon MD (Bernita)        [Provider Managed Hold] escitalopram (LEXAPRO) tablet 5 mg  5 mg oral q AM Elva Kirby DO        finasteride (PROSCAR) tablet 5 mg  5 mg oral Daily Cristina Rincon MD (Bernita)   5 mg at 12/31/24 0945    levothyroxine (SYNTHROID) tablet 100 mcg  100 mcg oral Daily (6:30a) Cristina Rincon MD (Bernita)   100 mcg at 12/31/24 0713    methenamine (HIPREX) tablet 1 g  1 g oral Daily before lunch Cristina Rincon MD (Bernita)   1 g at 12/31/24 1130    metoprolol succinate ER (TOPROL-XL) pre-halved 24 hr ER tablet 12.5 mg  12.5 mg oral Daily with dinner Elva Kirby DO        pantoprazole (PROTONIX) tablet,delayed release (DR/EC) 40 mg  40 mg oral BID Alix Miller MD        sodium chloride 0.9 % infusion  5 mL/hr intravenous PRN Cristina Rincon MD (Bernita)        [Provider Managed Hold] tamsulosin (FLOMAX) 24 hr ER capsule 0.4 mg  0.4 mg oral Daily Cristina Rincon MD (Bernita)        [Provider Managed Hold] torsemide (DEMADEX) tablet 10 mg  10 mg oral Daily Cristina Rincon MD (Bernita)         Current Outpatient Medications   Medication Sig Dispense Refill    aspirin 81 mg enteric coated tablet Take 81 mg by mouth daily.      bimatoprost (LUMIGAN) 0.01 % ophthalmic drops Administer 1 drop into the left eye nightly.      calcium, as carbonate, (TUMS) 200 mg calcium (500 mg) chewable tablet Take 1 tablet by mouth daily.      cranberry extract 425 mg capsule Take 425  "mg by mouth daily.      cyanocobalamin (VITAMIN B12) 500 mcg tablet Take 500 mcg by mouth every morning.      escitalopram (LEXAPRO) 10 mg tablet Take 10 mg by mouth every morning.      famotidine (PEPCID) 10 mg tablet Take 1 tablet (10 mg total) by mouth daily Indications: gastroesophageal reflux disease. 30 tablet 0    ferrous sulfate 325 mg (65 mg iron) tablet Take 65 mg by mouth daily with breakfast.      finasteride (PROSCAR) 5 mg tablet Take 1 tablet (5 mg total) by mouth daily. 30 tablet 0    levothyroxine (SYNTHROID) 100 mcg tablet Take 100 mcg by mouth daily.      melatonin 10 mg capsule Take 10 mg by mouth nightly.      methenamine (HIPREX) 1 gram tablet Take 1 g by mouth daily before lunch.      metoprolol succinate XL (TOPROL-XL) 25 mg 24 hr tablet Take 12.5 mg by mouth daily with dinner.      omeprazole (PriLOSEC) 20 mg capsule Take 20 mg by mouth daily before breakfast.      potassium chloride (KLOR-CON) 10 mEq CR tablet Take 10 mEq by mouth 2 (two) times a day.      tamsulosin (FLOMAX) 0.4 mg capsule Take 1 capsule (0.4 mg total) by mouth daily. 30 capsule 0    thiamine HCl (VITAMIN B1) 100 mg tablet Take 100 mg by mouth daily.      torsemide (DEMADEX) 10 mg tablet Take 10 mg by mouth daily.      carboxymethylcellulose (REFRESH PLUS) 0.5 % dropperette Administer 1 drop into both eyes every2 (two) hours while awake.         Review of Systems  Pertinent items are noted in HPI.    Objective     Physicial Exam  Visit Vitals  /76 (BP Location: Right upper arm, Patient Position: Lying)   Pulse 61   Temp 36.5 °C (97.7 °F) (Oral)   Resp 18   Ht 1.93 m (6' 4\")   Wt 74.8 kg (165 lb)   SpO2 97%   BMI 20.08 kg/m²     General appearance: alert, appears stated age and cooperative  HEENT: normocephalic, no JVD, trachea midline, atraumatic  Lungs: No increased work of breathing.   Chest wall: no tenderness  Heart: Regular rate and rhythm.   Abdomen: soft, non-tender, non-distended colostomy pink healthy " protuberant, digitized easily to the level of the fascia, no blood on the glove  Extremities: extremities warm and well-perfused.  Pulses: 2+ and symmetric, except as noted.   Skin: Skin color, texture, turgor normal.  Neurologic: Grossly normal. Moves all four extremities spontaneously.        Labs  CBC Results         12/31/24 12/31/24 12/31/24     1133 0534 0426    WBC 4.16 4.91 5.10    RBC 2.38 2.18 2.20    HGB 7.8 7.2 7.2    HCT 24.5 23.0 23.1    .9 105.5 105.0    MCH 32.8 33.0 32.7    MCHC 31.8 31.3 31.2    PLT 83 96 95           Comment for PLT at 1133 on 12/31/24: CONSISTENT WITH PREVIOUS RESULTS          BMP Results         12/31/24 12/31/24 12/30/24     0534 0426 2030     139 138    K 4.0 4.3 4.6    Cl 109 109 108    CO2 25 24 22    Glucose 104 111 202    BUN 26 28 29    Creatinine 1.5 1.5 1.6    Calcium 8.1 8.1 8.4    Anion Gap 4 6 8    EGFR 43.1 43.1 39.9           Comment for K at 0534 on 12/31/24: Results obtained on plasma. Plasma Potassium values may be up to 0.4 mEQ/L less than serum values. The differences may be greater for patients with high platelet or white cell counts.    Comment for K at 0426 on 12/31/24: Results obtained on plasma. Plasma Potassium values may be up to 0.4 mEQ/L less than serum values. The differences may be greater for patients with high platelet or white cell counts.    Comment for K at 2030 on 12/30/24: Results obtained on plasma. Plasma Potassium values may be up to 0.4 mEQ/L less than serum values. The differences may be greater for patients with high platelet or white cell counts.    Comment for EGFR at 0534 on 12/31/24: Calculation based on the Chronic Kidney Disease Epidemiology Collaboration (CKD-EPI) equation refit without adjustment for race.    Comment for EGFR at 0426 on 12/31/24: Calculation based on the Chronic Kidney Disease Epidemiology Collaboration (CKD-EPI) equation refit without adjustment for race.    Comment for EGFR at 2030 on 12/30/24:  Calculation based on the Chronic Kidney Disease Epidemiology Collaboration (CKD-EPI) equation refit without adjustment for race.              Imaging  I have reviewed the Imaging from the last 24 hrs.    Assessment      89 y.o. 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 Bryant, CAD s/p CABG (1996), who presents with recurrent GI bleeding.     His colostomy appears totally healthy. Suspect that this is recurrent diverticular bleeding       Plan     -No acute surgical intervention  -Transfuse as needed per primary team  -Appreciate GI recs, agree that repeat scope is probably not warranted at the time  -Discussed with family and surgery attending Dr. Annette Varma MD

## 2024-12-31 NOTE — ASSESSMENT & PLAN NOTE
Pt hypotensive on arrival   Home regimen: metop sux 12.5 mg, not on AC given GIB    - hold metop as patient not in RVR - low threshold to restart

## 2024-12-31 NOTE — ASSESSMENT & PLAN NOTE
Patient presenting with bright red blood in ostomy bag  On admission, hgb 8.4  (baseline seems to be ~ 8-9 )  Last colonoscopy 10/18/24, showed diverticuli and 2 polyps  CTA with no active bleed  Admitted about 1 month ago with GI bleed, EGD and flex sig without evidence of active bleeding    Ddx recurrent diverticular bleed versus 2/2 to his stoma creation he has pockets which bleed    - F/u GI c/s  - f/u surgery c/s   - Trend CBC BID vs q8 hrs; transfuse >8 given active bleeding and hx of HF   - IV PPI BID  - NPO for now  - Two large bore IVs in place; T&S sent and consent in chart; SCDs for DVT ppx  - If large bloody BM then obtain stat CTA for potential embolization

## 2024-12-31 NOTE — ASSESSMENT & PLAN NOTE
"89 y.o. male with a PMH significant for GIB presented to the [facility] ED on 12/31 with the CC of bright red blood in ostomy-bag admitted with GIB.    - Code status: changed to DNR/DNI today  - Prognosis:  High risk for acute deterioration and decline  - Capacity for Medical Decision Making: intact  - Advance Directives: not on file in EMR, OOH DNR on file  - Surrogate Decision Maker:  The patient is currently able to make his own medical decisions.  He identifies his sons, Marko, Ethan, Watson and Samy as surrogate decision makers.    - Goals of care: Goals are life prolonging with exception of DNR/DNI.  He is willing to return to the hospital if needed for evaluation and treatment.  Not interested in \"heroics\".       "

## 2024-12-31 NOTE — ASSESSMENT & PLAN NOTE
Pt with hx of HFrEF 40-45%, with a dilated RV cavity, torrential tricuspid regurgitation, dilated RA stable from previous   Home regimen: metop sux, torsemide 10 mg daily   Appears euvoemlic on exam +pleural effusion on CXR     - hold home diuretic given GIB, restart as able   - CHF orderset, strict I/o, monitor volume status   - consideration of thoracentesis if patient symptomatic   - keep K>4 and Mg >2

## 2024-12-31 NOTE — PROGRESS NOTES
Updated patient son at bedside. All questions and concerns answered at this time.    Elva Kirby,   PGY-3, Internal Medicine

## 2024-12-31 NOTE — ED PROVIDER NOTES
Emergency Medicine Note  HPI   HISTORY OF PRESENT ILLNESS     Patient is a 89-year-old male presenting with bleeding colostomy bag.  Reports over the past 1 hour has noted dark red blood feeling colostomy bag.  Has filled approximately 2 bags prior to arrival.  No associated pain including but limited abdominal pain.  Reports similar symptoms in past.            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)     Coronary artery disease     Disease of thyroid gland     Will catheter in place     6/25 not at present    GI (gastrointestinal bleed)     Hypertension     Myocardial infarction (CMS/HCC)     Orthostatic hypotension     TIA (transient ischemic attack)        Past Surgical History   Procedure Laterality Date    cataract extraction right eye with implant and limbal relaxing incision Right 7/18/2024    Performed by Hayder Moses MD at  OR [facility]    Cataract extraction w/ intraocular lens implant Left     Cataract extraction with LRI and anterior vitrectomy left eye Left 11/16/2023    Performed by Hayder Moses MD at  OR [facility]    Cholecystectomy      Colostomy      COLOSTOMY LAPAROSCOPIC N/A 8/3/2022    [place] [facility] [place]    Coronary artery bypass graft      DIAGNOSTIC FLEXIBLE SIGMOIDOSCOPY WITH COLLECTION OF SPECIMEN BY WASHING N/A 11/27/2024    Performed [place]comb [place] [facility] [facility]    DIAGNOSTIC FLEXIBLE SIGMOIDOSCOPY WITH COLLECTION OF SPECIMEN BY WASHING N/A 10/18/2024    [place]formed by [place] Lopez [place] [facility]    DIAGNOSTIC UPPER GASTROINTESTINAL ENDOSCOPY WITH COLLECTION OF SPECIMEN BY WASHING N/A 11/27/2024    Performed by [place]comb [place] [facility]    FLEXIBLE SIGMOIDOSCOPY WITH BIOPSY N/A 10/23/2024    Performed  by David Holcomb MD at [facility] [place] [place]    INCISION AND DRAINAGE WITH DEBRIDMENT OF BUTTOCK, AND PERINEUM N/A 8/2/2022    Performed by Mat Bryant MD at [facility] [place] [place]    Joint replacement Left     Knee    RIGHT HEART CATH N/A 8/4/2022    Performed by Cristal Lacey MD at [facility] [place] [place] CARDIAC CATH/EP    Thyroidectomy      WASHOUT OF PERINEAL WOUND, COLONIC LAVAGE N/A 8/3/2022    Performed by Mat Bryant MD at [facility] [place] [place]       No family history on file.    Social History     Tobacco Use    Smoking status: Never    Smokeless tobacco: Never   Vaping Use    Vaping status: Never Used   Substance Use Topics    Alcohol use: Not Currently     Comment: social    Drug use: Never         Review of Systems   REVIEW OF SYSTEMS     Review of Systems   Constitutional:  Negative for fever.   HENT:  Negative for congestion and rhinorrhea.    Respiratory:  Negative for cough and shortness of breath.    Cardiovascular:  Negative for chest pain and leg swelling.   Gastrointestinal:  Negative for abdominal pain, diarrhea and vomiting.   Neurological:  Negative for headaches.         VITALS     ED Vitals      Date/Time Temp Pulse Resp BP SpO2 [facility]   12/31/24 1130 36.5 °C (97.7 °F) 61 18 109/76 97 % HB   12/31/24 0804 36.5 °C (97.7 °F) 65 18 103/68 -- HB   12/31/24 0704 36.4 °C (97.5 °F) 62 18 109/60 -- HB   12/31/24 0600 36.8 °C (98.2 °F) -- -- 102/62 -- JTJ   12/31/24 0519 36.8 °C (98.3 °F) 65 18 101/54 99 % AR   12/31/24 0504 36.8 °C (98.2 °F) 73 18 93/55 99 % AR   12/31/24 0423 -- 77 18 93/56 97 % AR   12/31/24 0100 -- 77 20 100/59 96 % CLC   12/31/24 0030 -- 74 20 115/67 96 % CLC   12/31/24 0000 -- 73 18 98/61 98 % CLC   12/30/24 2330 -- 74 24 107/59 96 % CLC   12/30/24 2300 -- 77 20 109/63 98 % [facility]   12/30/24 2239 -- 77 20 97/51 98 % [facility]   12/30/24 2130 -- 78 17 93/55 -- KE   12/30/24 2100 -- 81 20 100/56 98 % [facility]   12/30/24 2036 36.5 °C (97.7 °F) 80 18 93/55 97 % [facility]   12/30/24 2017 36.8 °C (98.2 °F) 83 16 91/51 97 % [place]          Pulse Ox %: 98 %  (12/30/24 2135)  Pulse Ox Interpretation: Normal (12/30/24 2135)  Heart Rate: 80 (12/30/24 2135)  Rhythm Strip Interpretation: Normal Sinus Rhythm (12/30/24 2135)     Physical Exam   PHYSICAL EXAM     Physical Exam  Vitals and nursing note reviewed.   Constitutional:       Appearance: He is not toxic-appearing or diaphoretic.   HENT:      Head: Normocephalic.      Mouth/Throat:      Mouth: Mucous membranes are moist.      Pharynx: Oropharynx is clear.   Eyes:      Pupils: Pupils are equal, round, and reactive to light.   Cardiovascular:      Rate and Rhythm: Normal rate and regular rhythm.      Pulses: Normal pulses.      Heart sounds: Normal heart sounds.   Pulmonary:      Effort: Pulmonary effort is normal. No respiratory distress.      Breath sounds: Normal breath sounds. No wheezing, rhonchi or rales.   Abdominal:      General: Abdomen is flat. There is no distension.      Palpations: Abdomen is soft.      Tenderness: There is no abdominal tenderness. There is no guarding or rebound.      Comments: Colostomy bag filled with dark red blood.    Musculoskeletal:         General: Normal range of motion.   Skin:     General: Skin is warm and dry.   Neurological:      Mental Status: He is alert.   Psychiatric:         Mood and Affect: Mood normal.         Behavior: Behavior normal.           PROCEDURES     Procedures     DATA     Results       Procedure Component Value Units Date/Time    CBC and differential [[id number]]  (Abnormal) Collected: 12/31/24 0426    Specimen: Blood, Venous Updated: 12/31/24 0717     WBC 5.10 K/uL      RBC 2.20 M/uL      Hemoglobin 7.2 g/dL      Hematocrit 23.1 %      .0 fL      MCH 32.7 pg      MCHC 31.2 g/dL      RDW 19.1 %      Platelets 95 K/uL      MPV 10.5 fL      Differential Type Auto     nRBC 0.0 %      Immature Granulocytes 0.2 %      Neutrophils 75.2 %      Lymphocytes 10.2 %      Monocytes 12.2 %      Eosinophils 1.4 %      Basophils 0.8 %      Immature Granulocytes,  Absolute 0.01 K/uL      Neutrophils, Absolute 3.84 K/uL      Lymphocytes, Absolute 0.52 K/uL      Monocytes, Absolute 0.62 K/uL      Eosinophils, Absolute 0.07 K/uL      Basophils, Absolute 0.04 K/uL      PLT Morphology Normal     Platelet Estimate Decreased (51,000-149,000)     Anisocytosis 1+     Hypochromia Occasional     Macrocytes 1+     Ovalocytes 1+     Poikilocytes 1+     Polychromasia Occasional     Teardrop Cells Occasional    CBC and differential [[id number]]  (Abnormal) Collected: 12/30/24 2322    Specimen: Blood, Venous Updated: 12/31/24 0022     WBC 5.58 K/uL      RBC 2.41 M/uL      Hemoglobin 7.9 g/dL      Hematocrit 25.6 %      .2 fL      MCH 32.8 pg      MCHC 30.9 g/dL      RDW 18.9 %      Platelets 87 K/uL      Comment: CONSISTENT WITH PREVIOUS RESULTS        MPV 10.6 fL      Differential Type Auto     nRBC 0.0 %      Immature Granulocytes 0.2 %      Neutrophils 78.5 %      Lymphocytes 8.1 %      Monocytes 11.6 %      Eosinophils 1.1 %      Basophils 0.5 %      Immature Granulocytes, Absolute 0.01 K/uL      Neutrophils, Absolute 4.38 K/uL      Lymphocytes, Absolute 0.45 K/uL      Monocytes, Absolute 0.65 K/uL      Eosinophils, Absolute 0.06 K/uL      Basophils, Absolute 0.03 K/uL      PLT Morphology Normal     Platelet Estimate Decreased (51,000-149,000)     Anisocytosis 1+     Macrocytes 1+     Ovalocytes Occasional     Polychromasia Occasional     Teardrop Cells Occasional     [place] Cells Occasional    CBC and differential [[id number]]  (Abnormal) Collected: 12/30/24 2030    Specimen: Blood, Venous Updated: 12/30/24 2200     WBC 4.89 K/uL      RBC 2.53 M/uL      Hemoglobin 8.4 g/dL      Hematocrit 27.1 %      .1 fL      MCH 33.2 pg      MCHC 31.0 g/dL      RDW 19.3 %      Platelets 99 K/uL      Comment: RESULTS OBTAINED AFTER VORTEXING TO ELIMINATE PLT CLUMPS RESULTS CHECKED SPECIMEN QUALITY CHECKED        MPV 10.5 fL      Differential Type Auto     nRBC 0.0 %      Immature  Granulocytes 0.2 %      Neutrophils 80.7 %      Lymphocytes 6.3 %      Monocytes 11.2 %      Eosinophils 1.0 %      Basophils 0.6 %      Immature Granulocytes, Absolute 0.01 K/uL      Neutrophils, Absolute 3.94 K/uL      Lymphocytes, Absolute 0.31 K/uL      Monocytes, Absolute 0.55 K/uL      Eosinophils, Absolute 0.05 K/uL      Basophils, Absolute 0.03 K/uL      PLT Morphology Normal     Platelet Estimate Decreased (51,000-149,000)     Anisocytosis 1+     Macrocytes 1+     Ovalocytes Occasional     Polychromasia Occasional     Teardrop Cells Occasional     [place] Cells Occasional    Type and screen [[id number]] Collected: 12/30/24 2030    Specimen: Blood, Venous Updated: 12/30/24 2141     Specimen Expiration 01/02/2025     Antibody Screen Negative     ABO O     Rh Factor Positive     History Check Previous type on file    Protime-INR [[id number]]  (Abnormal) Collected: 12/30/24 2030    Specimen: Blood, Venous Updated: 12/30/24 2119     PT 19.8 sec      INR 1.7     Comment: Moderate Intensity Anticoagulation = 2.0 to 3.0, High Intensity = 2.5 to 3.5       PTT [[id number]]  (Normal) Collected: 12/30/24 2030    Specimen: Blood, Venous Updated: 12/30/24 2119     PTT 32 sec     Comprehensive metabolic panel [[id number]]  (Abnormal) Collected: 12/30/24 2030    Specimen: Blood, Venous Updated: 12/30/24 2116     Sodium 138 mEQ/L      Potassium 4.6 mEQ/L      Comment: Results obtained on plasma. Plasma Potassium values may be up to 0.4 mEQ/L less than serum values. The differences may be greater for patients with high platelet or white cell counts.        Chloride 108 mEQ/L      CO2 22 mEQ/L      BUN 29 mg/dL      Creatinine 1.6 mg/dL      Glucose 202 mg/dL      Calcium 8.4 mg/dL      AST (SGOT) 33 IU/L      ALT (SGPT) 5 IU/L      Alkaline Phosphatase 85 IU/L      Total Protein 7.4 g/dL      Comment: Test performed on plasma which typically contains approximately 0.4 g/dL more protein than serum.        Albumin 3.0 g/dL       Bilirubin, Total 0.7 mg/dL      eGFR 39.9 mL/min/1.73m*2      Comment: Calculation based on the Chronic Kidney Disease Epidemiology Collaboration (CKD-EPI) equation refit without adjustment for race.        Anion Gap 8 mEQ/L     Lactate, w/ reflex repeat if > 2.0 [[id number]]  (Abnormal) Collected: 12/30/24 2030    Specimen: Blood, Venous Updated: 12/30/24 2045     Lactate 2.9 mmol/L             Imaging Results    None         ECG 12 lead      ECG 12 lead          Scoring tools                                  ED Course & MDM   MDM / ED COURSE / CLINICAL IMPRESSION / DISPO     Medical Decision Making  This is a nontoxic-appearing 89-year-old male presenting to ED for evaluation of dark red blood in colostomy bag.  Mildly hypertensive, remainder vital signs within normal limits.  Exam as above.  Plan for labs and imaging as above.  Close reassessment.    Amount and/or Complexity of Data Reviewed  Independent Historian: EMS  External Data Reviewed: notes.  Labs: ordered.        ED Course as of 12/31/24 1306   Mon Dec 30, 2024   2059 ECG independently interpreted by me.  Atrial fibrillation at a rate of approximately 60 to 80 bpm.  Right bundle branch block.  No STEMI. [LA]   2059 Patient reassessed at this time.  Resting comfortably.  Family now at bedside, updated on plan. [LA]   3404 Preliminary Impression:      FINDINGS:  c/w: Prior 12/27/24.    Abdominal aorta: No aneurysm or dissection.  Celiac artery: Patent.  SMA: Patent.  Renal arteries: Patent.  Iliac arteries: Patent.    Bilateral pleural effusions, stable. Large hiatal hernia.    Liver unremarkable.  Cholelithiasis. Adrenal glands unremarkable.  Pancreas unremarkable.  Spleen unremarkable.  Kidneys unremarkable.  Cecum and ascending colon and transverse colon are distended with fluid, possibly hemorrhage. Surgical clips are seen in the cecal pole. Active intraluminal contrast extravasation seen in the cecal pole on arterial phase and delayed phase images.  Diverticulosis.  No free fluid/air.  Pelvic organs unremarkable.  No acute fracture/dislocation.      IMPRESSION:  1. Active intraluminal contrast extravasation seen in the cecal pole where surgical clips are noted. Finding are compatible with active GI hemorrhage.  2. Cholelithiasis.  3. Bilateral pleural effusions, stable.  4. Large hiatal hernia.  5. Diverticulosis.          Interpreted by: Sonu Najera MD, Dec 30, 2024 11:20 PM    Addendum:    Interpreted by: Sonu Najera MD, Dec 30, 2024 11:24 PM    The report of above findings was discussed by telephone with SOPHIA Phan on Dec 30, 2024 at 11:21 PM and these results were critically read back.    [LA]   2330 Patient reassessed at this time.  Patient reports resolution of bleeding during observation ED.  Resting comfortably.  Vital signs within normal limits.  CT scan findings noted.  Patient and family report that they would not be interested in IR procedure at this time given resolution of bleeding. [LA]      ED Course User Index  [LA] Justice Roy MD     Clinical Impression      Gastrointestinal hemorrhage, unspecified gastrointestinal hemorrhage type     _________________       ED Disposition   Admit / Observation                       Justice Roy MD  12/31/24 3751

## 2024-12-31 NOTE — ASSESSMENT & PLAN NOTE
Pt with multiple admissions for GIB as above  Baseline hemoglobin around 8/9, currently 7.9   Hx of HF so goal should be >8   Platelets 87  Likely iso of CKD, ACD verus less likely malignancy     - blood smear to view platelets and blood cells c/f bone suppression   - iron studies  - vit b12 and folate

## 2024-12-31 NOTE — ASSESSMENT & PLAN NOTE
Cr 1.6 from baseline of 1.4  Likely in the setting of volume depletion 2/2 to GIB     - monitor with fluids and blood response   - trend on BMP   - avoid nephrotoxins   - bladder scan

## 2024-12-31 NOTE — ASSESSMENT & PLAN NOTE
Pt with hx of reucrrent pleural effusions on L - seen on CXR, and at base of CT   S/p thoracentesis on 12/17     - CTM, no hypoxia   - f/u OP with pulmonology for consideration of further thoracentesis

## 2024-12-31 NOTE — SIGNIFICANT EVENT
I saw and evaluated the patient. I discussed the case with the resident and agree with the findings and plan as documented in the note except for my comments below or within the additional notes today.     89-year-old male with a past medical history of heart failure with moderately reduced ejection fraction, torrential TR, recurrent left pleural effusion, dysphagia, anemia, atrial fibrillation not on anticoagulation due to history of GI bleed, CAD status post CABG, hypertension, hypothyroidism who presented to the hospital with bright red blood noted in ostomy bag- reportedly 1 bag full  prior to arrival. Denies any other symptoms.       HPI reviewed and otherwise as stated in resident note below. 14 point ROS reviewed and otherwise as stated in resident note below.     Code status discussed, see resident note below    In ED he was found to be min hypotensive on arrival bs and vss now normal,  labs notable for hgb- 7.9 (baseline 8.5), platelets 80s- at baseline, Cr- 1.6,  imagingrevealed + CTA- see below however had no frther bleeding since arrival and per report to me patient declined intervention since no further bleeding. He confirms this however would want IR procedure if bleeding recurs     Interventions- none     PMhx/PShx/Shx/Meds reviewed.    Physical Exam:   VS: as above  General: NAD, very pleasant, AAOx4  HEENT: no JVD, mmm  Lungs: CTA b/l, no m/r/r  Cards: RRR,3/6 SM   Abd: Soft, NT, ND, +BD  Ext: no c/c/e    Labs reviewed, as above/below     Imaging reviewed,  notable for CTA :  IMPRESSION:  1. Active intraluminal contrast extravasation seen in the cecal pole where surgical clips are noted. Finding are compatible with active GI hemorrhage.  2. Cholelithiasis.  3. Bilateral pleural effusions, stable.  4. Large hiatal hernia.  5. Diverticulosis.     EKG reviewed- afib, qtc- 525     #LGIB/symptomatic anemia -+ cta, no further bleeding episodes and per ed report to me, patient and family declined IR  procedure at this time since no further bleeding  -npo, 2 large bore IV,  trend cbc, GI consulted, surgery consulted given frequent GIb  -consented for blood, transfuse for hgb<8 given cardiac hx   -hold asa   -check iron studies, b12, folate    #thrombocytopenia- chronic, stable  -hold asa in setting of bleed   -check peripheral smear    #CALI- suspect hypovolemia/prerenal 2/2 gi loses  -ivf overnight     #hx of chf- hypovolemic clinically, hold diuretics, daily weights, I/o     #GOC- palliative care consulted     #chronic medical problems otherwise stable, plan reviewed and outlined below    Prophylaxis: scds in setting of GIB

## 2024-12-31 NOTE — PROGRESS NOTES
Internal Medicine  Daily Progress Note       Assessment & Plan  Gastrointestinal hemorrhage, unspecified gastrointestinal hemorrhage type  Patient presenting with bright red blood in ostomy bag  On admission, hgb 8.4  (baseline seems to be ~ 8-9 )  Last colonoscopy 10/18/24, showed diverticuli and 2 polyps  CTA with no active bleed  Admitted about 1 month ago with GI bleed, EGD and flex sig without evidence of active bleeding    Ddx recurrent diverticular bleed versus 2/2 to his stoma creation he has pockets which bleed    - F/u GI c/s  - f/u surgery c/s   - Trend CBC BID vs q8 hrs; transfuse >8 given active bleeding and hx of HF   - 1u PRBC ordered, f/u post transfusion CBC  - IV PPI BID  - NPO for now  - Two large bore IVs in place; T&S sent and consent in chart; SCDs for DVT ppx  - If large bloody BM then obtain stat CTA for potential embolization  Acute on chronic anemia  Pt with multiple admissions for GIB as above  Baseline hemoglobin around 8/9, currently 7.9   Hx of HF so goal should be >8   Platelets 87  Likely iso of CKD, ACD verus less likely malignancy   Iron 51, Ferritin 38, Iron sat 16, TIBC 315    - blood smear to view platelets and blood cells c/f bone suppression   - vit b12 and folate  Colostomy present on admission (CMS/[facility])  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   Extensive GIB hx     - general surgery consult given recurrent bleeds  - palliative care consult to clarify GOC's  - monitor output   Acute kidney injury superimposed on CKD (CMS/[facility])  (CMS/[facility])  Cr 1.6 from baseline of 1.4  Likely in the setting of volume depletion 2/2 to GIB     - monitor with fluids and blood response   - trend on BMP   - avoid nephrotoxins   - bladder scan   HFrEF (heart failure with reduced ejection fraction) (CMS/[facility])  Pt with hx of HFrEF 40-45%, with a dilated RV cavity, torrential tricuspid regurgitation, dilated RA stable from previous   Home regimen: metop  sux, torsemide 10 mg daily   Appears euvoemlic on exam +pleural effusion on CXR     - hold home diuretic given GIB, restart as able   - CHF orderset, strict I/o, monitor volume status   - consideration of thoracentesis if patient symptomatic   - keep K>4 and Mg >2  Atrial fibrillation (CMS/HCC)  Pt hypotensive on arrival   Home regimen: metop sux 12.5 mg, not on AC given GIB    - hold metop as patient not in RVR - low threshold to restart   CAD (coronary artery disease)  S/p CABG   On aspirin from last admit, held plavix iso of GIB     - hold aspirin given GIB, restart as able   - will need multi-disciplinary discussions regarding AP/AC  Recurrent left pleural effusion  Pt with hx of reucrrent pleural effusions on L - seen on CXR, and at base of CT   S/p thoracentesis on 12/17     - CTM, no hypoxia   - f/u OP with pulmonology for consideration of further thoracentesis   Hypothyroidism  Continue synthroid   Palliative care by specialist      SUBJECTIVE   This is a 89 y.o. year-old male admitted on 12/30/2024 with Gastrointestinal hemorrhage, unspecified gastrointestinal hemorrhage type [K92.2].    Interval History: No acute events since admission. See H&P. Patient states he had dark red clots with blood 2 days ago with brown stool.     OBJECTIVE   Vital signs in last 24 hours:  Temp:  [36.4 °C (97.5 °F)-36.8 °C (98.3 °F)] 36.5 °C (97.7 °F)  Heart Rate:  [62-83] 65  Resp:  [16-24] 18  BP: ()/(51-68) 103/68  SpO2:  [96 %-99 %] 99 %  Oxygen Therapy: None (Room air)    Weight & I/Os:  Weights (last 5 days)       Date/Time Weight    12/30/24 2036 74.8 kg (165 lb)            Intake/Output Summary (Last 24 hours) at 12/31/2024 0659  Last data filed at 12/31/2024 0523  24 Hour Net Input/Output from 7AM Yesterday   Intake 500 ml   Output --   Net 500 ml        PHYSICAL EXAMINATION   Physical Exam  Constitutional:       General: He is not in acute distress.     Appearance: He is ill-appearing.   Cardiovascular:      Rate  and Rhythm: Normal rate. Rhythm irregular.      Heart sounds: Normal heart sounds.   Pulmonary:      Effort: No respiratory distress.      Breath sounds: Normal breath sounds.   Abdominal:      General: There is no distension.      Palpations: Abdomen is soft.      Tenderness: There is no abdominal tenderness.      Comments: Ostomy bag with brown stool, no visible blood   Musculoskeletal:      Right lower leg: No edema.      Left lower leg: No edema.   Neurological:      Mental Status: He is alert.   Psychiatric:         Mood and Affect: Mood normal.         Behavior: Behavior normal.          LINES, CATHETERS, DRAINS, AIRWAYS, & WOUNDS   Lines, drains, airways, & wounds:  Peripheral IV (Adult) 12/30/24 Posterior;Right Forearm (Active)   Number of days: 1       Peripheral IV (Adult) 12/31/24 Left;Posterior Forearm (Active)   Number of days: 0        LABS, IMAGING, & TELE   Labs:  I have reviewed the patient's pertinent labs.  Significant abnormals are as noted below.    Results from last 7 days   Lab Units 12/31/24  0534 12/31/24  0426 12/30/24  2322   WBC K/uL 4.91 5.10 5.58   HEMOGLOBIN g/dL 7.2* 7.2* 7.9*   HEMATOCRIT % 23.0* 23.1* 25.6*   PLATELETS K/uL 96* 95* 87*       Results from last 7 days   Lab Units 12/31/24  0534 12/31/24  0426 12/30/24  2030   SODIUM mEQ/L 138 139 138   POTASSIUM mEQ/L 4.0 4.3 4.6   CHLORIDE mEQ/L 109* 109* 108*   CO2 mEQ/L 25 24 22   BUN mg/dL 26* 28* 29*   CREATININE mg/dL 1.5* 1.5* 1.6*   CALCIUM mg/dL 8.1* 8.1* 8.4*   ALBUMIN g/dL 2.8* 2.8* 3.0*   BILIRUBIN TOTAL mg/dL 0.9 0.9 0.7   ALK PHOS IU/L 76 74 85   ALT IU/L 5* 4* 5*   AST IU/L 25 24 33   GLUCOSE mg/dL 104* 111* 202*       Imaging personally reviewed (does not include unread studies):  No results found.       VTE Assessment: Padua    VTE Prophylaxis: Current anticoagulants:    None      Code Status: Full Code  Estimated discharge date: 1/1/2025     ATTENDING DOCUMENTATION  ALSO SEE ATTENDING ATTESTATION SECTION OF NOTE

## 2024-12-31 NOTE — H&P
"     Internal Medicine  History & Physical        CHIEF COMPLAINT   Blood in stoma bag      HISTORY OF PRESENT ILLNESS      This is a 89 y.o. 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     [facility], he states that it was greater than 2 bags.  He was asymptomatic at the time, no abdominal pain, has not had any runny stoma output, no syncope/lightheadedness and otherwise feels well. He denies any recent illnesses, no viral symptoms. States he otherwise feels well since this episode, this is similar to his previous. Of note patient states the IR procedures \"did not work\" and therefore would not like to undergo them again.     Of note patient has an extensive GI history he has an end colostomy with rectal stump (his transverse colon was divided). He most recently admitted to [facility] [place]26 to 11/28 for recurrent GI bleed with melena, underwent a EGD and flex sigmoidoscopy via ostomy without evidence of acute bleeding, during which he received 2 units of blood. He was admitted in 10/2024 during which he had a CT revealing an acute GI bleed, IR performed procedures on subselective SMA and MARIE, without evidence of discrete active bleed.  He underwent a colonoscopy through his ostomy on 10/18 which noted diverticuli and 2 polyps which were small and not removed.  However at this time he had a rectal bleed on 10/21, with flex sigmoidoscopy showing extensive diverticulosis.    He also has recurrent pleural effusions on the left, with his last thoracentesis on 12/17 removing 850 mL, previous data showing transudative effusions. He offers no respiratory complaints, including SOB, orthopnea/PND, cough/fever.     In the " ED:   Patient VSS, blood pressure as low as 91/51, 97% on room air.  Labs significant for creatinine 1.6 (b/l 1.4), lactate 2.9 --> 2.4, WBC 5K, hemoglobin 7.9 with .2, platelets 87.  CTA with no active bleed.  Checks x-ray with large left-sided effusion.  He was admitted to medicine for further workup of his GI bleed.    Temp:  [36.5 °C (97.7 °F)-36.8 °C (98.2 °F)] 36.5 °C (97.7 °F)  Heart Rate:  [73-83] 77  Resp:  [16-24] 20  BP: ()/(51-67) 100/59  SpO2:  [96 %-98 %] 96 %  Oxygen Therapy: None (Room air)    Labs:    Pertinent labs: creatinine 1.6 (b/l 1.4), lactate 2.9 --> 2.4, WBC 5K, hemoglobin 7.9 with .2, platelets 87.  UA: pending  VBG: pending     Imaging:    KG: afib with    CXR: As per my read large left-sided effusion, with right sided congestion  CTA A/P: teleread - no evidence of active contrast extravasation through the GI tract     The patient has confirmed that they would like to be FULL CODE.  DVT prophylaxis: SCDs.    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)     Coronary artery disease     Disease of thyroid gland     Will catheter in place     6/25 not at present    GI (gastrointestinal bleed)     Hypertension     Myocardial infarction (CMS/HCC)     Orthostatic hypotension     TIA (transient ischemic attack)        PSHx:  Past Surgical History   Procedure Laterality Date    cataract extraction right eye with implant and limbal relaxing incision Right 7/18/2024    Performed by Hayder Moses MD at  OR [facility]    Cataract extraction w/ intraocular lens implant Left     Cataract extraction with LRI and anterior vitrectomy left eye Left 11/16/2023    Performed by Hayder Moses MD at  OR [facility]    Cholecystectomy      Colostomy      COLOSTOMY LAPAROSCOPIC  N/A 8/3/2022    Performed by Mat Bryant MD at [facility] OR    Coronary artery bypass graft      DIAGNOSTIC FLEXIBLE SIGMOIDOSCOPY WITH COLLECTION OF SPECIMEN BY WASHING N/A 11/27/2024    Performed by David Holcomb MD at [facility] GI    DIAGNOSTIC FLEXIBLE SIGMOIDOSCOPY WITH COLLECTION OF SPECIMEN BY WASHING N/A 10/18/2024    Performed by Maine Lopez MD at [facility]    DIAGNOSTIC UPPER GASTROINTESTINAL ENDOSCOPY WITH COLLECTION OF SPECIMEN BY WASHING N/A 11/27/2024    Performed by David Holcomb MD at [facility]    FLEXIBLE SIGMOIDOSCOPY WITH BIOPSY N/A 10/23/2024    Performed by David Holcomb MD at [facility]    INCISION AND DRAINAGE WITH DEBRIDMENT OF BUTTOCK, AND PERINEUM N/A 8/2/2022    Performed by Mat Bryant MD at [facility] OR    Joint replacement Left     Knee    RIGHT HEART CATH N/A 8/4/2022    Performed by Cristal Lacey MD at [facility] CARDIAC CATH/EP    Thyroidectomy      WASHOUT OF PERINEAL WOUND, COLONIC LAVAGE N/A 8/3/2022    Performed by Mat Bryant MD at [facility] OR       PCP:   Zachery Hernandez MD    MEDICATIONS      Prior to Admission medications    Medication Sig Start Date End Date Taking? Authorizing Provider   aspirin 81 mg enteric coated tablet Take 81 mg by mouth daily.    ProviderClay MD   bimatoprost (LUMIGAN) 0.01 % ophthalmic drops Administer 1 drop into the left eye nightly.    ProviderClay MD   calcium, as carbonate, (TUMS) 200 mg calcium (500 mg) chewable tablet Take 1 tablet by mouth daily.    ProviderClay MD   carboxymethylcellulose (REFRESH PLUS) 0.5 % dropperette Administer 1 drop into both eyes every2 (two) hours while awake.    ProviderClay MD   cyanocobalamin (VITAMIN B12) 500 mcg tablet Take 500 mcg by mouth every morning.    ProviderClay MD   escitalopram (LEXAPRO) 5 mg tablet Take 5 mg by mouth every morning.    Clay Chambers MD   famotidine (PEPCID) 10 mg tablet Take 1 tablet (10 mg total) by mouth daily  Indications: gastroesophageal reflux disease. 12/8/23 3/26/25  Crystal Alonso DO   ferrous sulfate 325 mg (65 mg iron) tablet Take 65 mg by mouth daily with breakfast.    Clay Chambers MD   finasteride (PROSCAR) 5 mg tablet Take 1 tablet (5 mg total) by mouth daily. 8/2/24 9/24/25  Alex Morris DO   levothyroxine (SYNTHROID) 100 mcg tablet Take 100 mcg by mouth daily.    Clay Chambers MD   melatonin 10 mg capsule Take 10 mg by mouth nightly.    Clay Chambers MD   methenamine (HIPREX) 1 gram tablet Take 1 g by mouth daily before lunch.    Clay Chambers MD   metoprolol succinate XL (TOPROL-XL) 25 mg 24 hr tablet Take 12.5 mg by mouth daily with dinner.    Clay Chambers MD   omeprazole (PriLOSEC) 20 mg capsule Take 20 mg by mouth daily before breakfast.    Clay Chambers MD   potassium chloride (KLOR-CON) 10 mEq CR tablet Take 10 mEq by mouth 2 (two) times a day.    Clay Chambers MD   tamsulosin (FLOMAX) 0.4 mg capsule Take 1 capsule (0.4 mg total) by mouth daily. 8/2/24 9/24/25  Alex Morris DO   thiamine HCl (VITAMIN B1) 100 mg tablet Take 100 mg by mouth daily.    Clay Chambers MD   torsemide (DEMADEX) 10 mg tablet Take 10 mg by mouth daily.    Clay Chambers MD   amLODIPine (NORVASC) 2.5 mg tablet amlodipine 2.5 mg tablet  7/22/22  John Chambers MD   apixaban (ELIQUIS) 2.5 mg tablet   7/22/22  John Chambers MD   hydrochlorothiazide (MICROZIDE) 12.5 mg capsule hydrochlorothiazide 12.5 mg capsule  7/22/22  John Chambers MD       Home medications were personally reviewed.    ALLERGIES      Jardiance [empagliflozin]    FAMILY HISTORY      No family history on file.    SOCIAL HISTORY      Social History     Socioeconomic History    Marital status:      Spouse name: None    Number of children: None    Years of education: None    Highest education level: None   Tobacco Use     Smoking status: Never    Smokeless tobacco: Never   Vaping Use    Vaping status: Never Used   Substance and Sexual Activity    Alcohol use: Not Currently     Comment: social    Drug use: Never    Sexual activity: Defer     Social Drivers of Health     Financial Resource Strain: Low Risk  (8/4/2023)    Overall Financial Resource Strain (CARDIA)     Difficulty of Paying Living Expenses: Not hard at all   Food Insecurity: No Food Insecurity (12/30/2024)    Hunger Vital Sign     Worried About Running Out of Food in the Last Year: Never true     Ran Out of Food in the Last Year: Never true   Transportation Needs: No Transportation Needs (10/16/2024)    PRAPARE - Transportation     Lack of Transportation (Medical): No     Lack of Transportation (Non-Medical): No   Housing Stability: Low Risk  (10/16/2024)    Housing Stability Vital Sign     Unable to Pay for Housing in the Last Year: No     Number of Times Moved in the Last Year: 0     Homeless in the Last Year: No       REVIEW OF SYSTEMS      Review of Systems    PHYSICAL EXAMINATION      Temp:  [36.5 °C (97.7 °F)-36.8 °C (98.2 °F)] 36.5 °C (97.7 °F)  Heart Rate:  [73-83] 77  Resp:  [16-24] 20  BP: ()/(51-67) 100/59  Body mass index is 20.08 kg/m².    Physical Exam  Constitutional:       Appearance: He is not ill-appearing.   HENT:      Nose: Nose normal.      Mouth/Throat:      Mouth: Mucous membranes are moist.   Eyes:      Pupils: Pupils are equal, round, and reactive to light.   Cardiovascular:      Rate and Rhythm: Normal rate. Rhythm irregular.      Pulses: Normal pulses.      Heart sounds: Murmur (diastolic murmur at left sternal edge) heard.   Pulmonary:      Effort: Pulmonary effort is normal.   Abdominal:      General: Abdomen is flat. There is no distension.      Comments: Stoma without evidence of strangulation, pink colored   No output in the bag at the time of my exam, no blood   No hernias   Soft non tender +BS      Musculoskeletal:      Right  lower leg: No edema.      Left lower leg: No edema.   Skin:     General: Skin is warm.      Capillary Refill: Capillary refill takes less than 2 seconds.   Neurological:      General: No focal deficit present.      Mental Status: He is alert.       LABS / IMAGING / EKG        Labs:  I have reviewed the patient's pertinent labs. Pertinent labs are within normal limits.    Microbiology Data personally reviewed:  Microbiology Results       ** No results found for the last 720 hours. **            Imaging personally reviewed(does not include unread studies):  IR THORACENTESIS    Result Date: 12/17/2024  IMPRESSION: Successful ultrasound-guided left-sided thoracentesis with 850 mL pleural fluid removed and discarded. I certify that I have personally reviewed this examination and agree with Avril Loya's report. Goyo Burnham M.D.    X-RAY CHEST 1 VIEW    Result Date: 12/17/2024  IMPRESSION:  Cardiomegaly with pulmonary edema and bilateral pleural effusions.      ECG/Telemetry  I have independently reviewed the telemetry. No events for the last 24 hours.       VTE Assessment: Padua    VTE Prophylaxis: Current anticoagulants:    None      Palliative Care Screen:    Code Status: Full Code  Estimated discharge date: 1/1/2025  Discussed advanced care planning. The patient was not able or did not want to name a surrogate decision maker. Samy was unable to provide an ACP.        Assessment & Plan  Gastrointestinal hemorrhage, unspecified gastrointestinal hemorrhage type  Patient presenting with bright red blood in ostomy bag  On admission, hgb 8.4  (baseline seems to be ~ 8-9 )  Last colonoscopy 10/18/24, showed diverticuli and 2 polyps  CTA with no active bleed  Admitted about 1 month ago with GI bleed, EGD and flex sig without evidence of active bleeding    Ddx recurrent diverticular bleed versus 2/2 to his stoma creation he has pockets which bleed    - F/u GI c/s  - f/u surgery c/s   - Trend CBC BID vs q8 hrs;  transfuse >8 given active bleeding and hx of HF   - IV PPI BID  - NPO for now  - Two large bore IVs in place; T&S sent and consent in chart; SCDs for DVT ppx  - If large bloody BM then obtain stat CTA for potential embolization  Acute on chronic anemia  Pt with multiple admissions for GIB as above  Baseline hemoglobin around 8/9, currently 7.9   Hx of HF so goal should be >8   Platelets 87  Likely iso of CKD, ACD verus less likely malignancy     - blood smear to view platelets and blood cells c/f bone suppression   - iron studies  - vit b12 and folate  Colostomy present on admission (CMS/[facility])  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   Extensive GIB hx     - general surgery consult given recurrent bleeds  - palliative care consult to clarify GOC's  - monitor output   Acute kidney injury superimposed on CKD (CMS/[facility])  (CMS/HCC)  Cr 1.6 from baseline of 1.4  Likely in the setting of volume depletion 2/2 to GIB     - monitor with fluids and blood response   - trend on BMP   - avoid nephrotoxins   - bladder scan   HFrEF (heart failure with reduced ejection fraction) (CMS/[facility])  Pt with hx of HFrEF 40-45%, with a dilated RV cavity, torrential tricuspid regurgitation, dilated RA stable from previous   Home regimen: metop sux, torsemide 10 mg daily   Appears euvoemlic on exam +pleural effusion on CXR     - hold home diuretic given GIB, restart as able   - CHF orderset, strict I/o, monitor volume status   - consideration of thoracentesis if patient symptomatic   - keep K>4 and Mg >2  Atrial fibrillation (CMS/[facility])  Pt hypotensive on arrival   Home regimen: metop sux 12.5 mg, not on AC given GIB    - hold metop as patient not in RVR - low threshold to restart   CAD (coronary artery disease)  S/p CABG   On aspirin from last admit, held plavix iso of GIB     - hold aspirin given GIB, restart as able   - will need multi-disciplinary discussions regarding AP/AC  Recurrent left  pleural effusion  Pt with hx of reucrrent pleural effusions on L - seen on CXR, and at base of CT   S/p thoracentesis on 12/17     - CTM, no hypoxia   - f/u OP with pulmonology for consideration of further thoracentesis   Hypothyroidism  Continue synthroid     ATTENDING DOCUMENTATION  ALSO SEE ATTENDING ATTESTATION SECTION OF NOTE

## 2025-01-01 ENCOUNTER — HOSPITAL ENCOUNTER (INPATIENT)
Facility: HOSPITAL | Age: 89
LOS: 6 days | End: 2025-06-05
Attending: EMERGENCY MEDICINE | Admitting: HOSPITALIST
Payer: MEDICARE

## 2025-01-01 ENCOUNTER — APPOINTMENT (EMERGENCY)
Dept: RADIOLOGY | Facility: HOSPITAL | Age: 89
End: 2025-01-01
Attending: EMERGENCY MEDICINE
Payer: MEDICARE

## 2025-01-01 ENCOUNTER — HOSPITAL ENCOUNTER (INPATIENT)
Facility: HOSPITAL | Age: 89
LOS: 1 days | DRG: 951 | End: 2025-06-05
Attending: INTERNAL MEDICINE | Admitting: INTERNAL MEDICINE
Payer: OTHER MISCELLANEOUS

## 2025-01-01 ENCOUNTER — HOSPITAL ENCOUNTER (INPATIENT)
Facility: HOSPITAL | Age: 89
LOS: 5 days | Discharge: HOME | DRG: 689 | End: 2025-05-29
Attending: EMERGENCY MEDICINE | Admitting: STUDENT IN AN ORGANIZED HEALTH CARE EDUCATION/TRAINING PROGRAM
Payer: MEDICARE

## 2025-01-01 ENCOUNTER — APPOINTMENT (INPATIENT)
Dept: RADIOLOGY | Facility: HOSPITAL | Age: 89
DRG: 689 | End: 2025-01-01
Attending: PHYSICIAN ASSISTANT
Payer: MEDICARE

## 2025-01-01 VITALS
HEART RATE: 68 BPM | WEIGHT: 165 LBS | HEIGHT: 73 IN | RESPIRATION RATE: 16 BRPM | BODY MASS INDEX: 21.87 KG/M2 | TEMPERATURE: 98.1 F | SYSTOLIC BLOOD PRESSURE: 135 MMHG | DIASTOLIC BLOOD PRESSURE: 66 MMHG | OXYGEN SATURATION: 96 %

## 2025-01-01 VITALS
TEMPERATURE: 97.7 F | DIASTOLIC BLOOD PRESSURE: 61 MMHG | SYSTOLIC BLOOD PRESSURE: 110 MMHG | RESPIRATION RATE: 22 BRPM | OXYGEN SATURATION: 95 % | HEART RATE: 93 BPM

## 2025-01-01 VITALS
OXYGEN SATURATION: 94 % | HEART RATE: 129 BPM | DIASTOLIC BLOOD PRESSURE: 57 MMHG | SYSTOLIC BLOOD PRESSURE: 99 MMHG | TEMPERATURE: 97.9 F | WEIGHT: 162 LBS | RESPIRATION RATE: 18 BRPM | BODY MASS INDEX: 21.47 KG/M2 | HEIGHT: 73 IN

## 2025-01-01 DIAGNOSIS — R41.82 ALTERED MENTAL STATUS, UNSPECIFIED ALTERED MENTAL STATUS TYPE: Primary | ICD-10-CM

## 2025-01-01 DIAGNOSIS — N39.0 LOWER URINARY TRACT INFECTIOUS DISEASE: ICD-10-CM

## 2025-01-01 DIAGNOSIS — J18.9 PNEUMONIA DUE TO INFECTIOUS ORGANISM, UNSPECIFIED LATERALITY, UNSPECIFIED PART OF LUNG: ICD-10-CM

## 2025-01-01 DIAGNOSIS — J90 PLEURAL EFFUSION: ICD-10-CM

## 2025-01-01 DIAGNOSIS — I95.9 HYPOTENSION, UNSPECIFIED HYPOTENSION TYPE: Primary | ICD-10-CM

## 2025-01-01 LAB
ALBUMIN SERPL-MCNC: 2.7 G/DL (ref 3.5–5.7)
ALBUMIN SERPL-MCNC: 2.8 G/DL (ref 3.5–5.7)
ALBUMIN SERPL-MCNC: 3.1 G/DL (ref 3.5–5.7)
ALP SERPL-CCNC: 76 IU/L (ref 34–125)
ALP SERPL-CCNC: 85 IU/L (ref 34–125)
ALP SERPL-CCNC: 87 IU/L (ref 34–125)
ALT SERPL-CCNC: 4 IU/L (ref 7–52)
ALT SERPL-CCNC: 4 IU/L (ref 7–52)
ALT SERPL-CCNC: 6 IU/L (ref 7–52)
ANION GAP SERPL CALC-SCNC: 10 MEQ/L (ref 3–15)
ANION GAP SERPL CALC-SCNC: 7 MEQ/L (ref 3–15)
ANION GAP SERPL CALC-SCNC: 7 MEQ/L (ref 3–15)
ANION GAP SERPL CALC-SCNC: 8 MEQ/L (ref 3–15)
ANION GAP SERPL CALC-SCNC: 9 MEQ/L (ref 3–15)
ANISOCYTOSIS BLD QL SMEAR: ABNORMAL
ANISOCYTOSIS BLD QL SMEAR: ABNORMAL
APTT PPP: 32 SEC (ref 23–35)
AST SERPL-CCNC: 24 IU/L (ref 13–39)
AST SERPL-CCNC: 30 IU/L (ref 13–39)
AST SERPL-CCNC: 30 IU/L (ref 13–39)
BACTERIA BLD CULT: NORMAL
BACTERIA BLD CULT: NORMAL
BACTERIA UR CULT: ABNORMAL
BACTERIA URNS QL MICRO: 1 /HPF
BASOPHILS # BLD: 0.03 K/UL (ref 0.01–0.1)
BASOPHILS NFR BLD: 0.5 %
BASOPHILS NFR BLD: 0.7 %
BASOPHILS NFR BLD: 0.8 %
BILIRUB SERPL-MCNC: 0.8 MG/DL (ref 0.3–1.2)
BILIRUB SERPL-MCNC: 0.8 MG/DL (ref 0.3–1.2)
BILIRUB SERPL-MCNC: 0.9 MG/DL (ref 0.3–1.2)
BILIRUB UR QL STRIP.AUTO: NEGATIVE MG/DL
BUN SERPL-MCNC: 23 MG/DL (ref 7–25)
BUN SERPL-MCNC: 28 MG/DL (ref 7–25)
BUN SERPL-MCNC: 29 MG/DL (ref 7–25)
BUN SERPL-MCNC: 29 MG/DL (ref 7–25)
BUN SERPL-MCNC: 30 MG/DL (ref 7–25)
BURR CELLS BLD QL SMEAR: ABNORMAL
CALCIUM SERPL-MCNC: 7.7 MG/DL (ref 8.6–10.3)
CALCIUM SERPL-MCNC: 7.9 MG/DL (ref 8.6–10.3)
CALCIUM SERPL-MCNC: 7.9 MG/DL (ref 8.6–10.3)
CALCIUM SERPL-MCNC: 8 MG/DL (ref 8.6–10.3)
CALCIUM SERPL-MCNC: 8.1 MG/DL (ref 8.6–10.3)
CALCIUM SERPL-MCNC: 8.2 MG/DL (ref 8.6–10.3)
CALCIUM SERPL-MCNC: 8.4 MG/DL (ref 8.6–10.3)
CHLORIDE SERPL-SCNC: 107 MEQ/L (ref 98–107)
CHLORIDE SERPL-SCNC: 109 MEQ/L (ref 98–107)
CHLORIDE SERPL-SCNC: 110 MEQ/L (ref 98–107)
CHLORIDE SERPL-SCNC: 111 MEQ/L (ref 98–107)
CHLORIDE SERPL-SCNC: 111 MEQ/L (ref 98–107)
CK SERPL-CCNC: 65 U/L (ref 30–223)
CLARITY UR REFRACT.AUTO: ABNORMAL
CO2 SERPL-SCNC: 21 MEQ/L (ref 21–31)
CO2 SERPL-SCNC: 22 MEQ/L (ref 21–31)
CO2 SERPL-SCNC: 23 MEQ/L (ref 21–31)
CO2 SERPL-SCNC: 25 MEQ/L (ref 21–31)
COLOR UR AUTO: YELLOW
CREAT SERPL-MCNC: 1.3 MG/DL (ref 0.7–1.3)
CREAT SERPL-MCNC: 1.6 MG/DL (ref 0.7–1.3)
CREAT SERPL-MCNC: 1.7 MG/DL (ref 0.7–1.3)
CREAT SERPL-MCNC: 1.7 MG/DL (ref 0.7–1.3)
CREAT SERPL-MCNC: 1.9 MG/DL (ref 0.7–1.3)
CROSSMATCH: NORMAL
DACRYOCYTES BLD QL SMEAR: ABNORMAL
DACRYOCYTES BLD QL SMEAR: ABNORMAL
DIFFERENTIAL METHOD BLD: ABNORMAL
EGFRCR SERPLBLD CKD-EPI 2021: 32.5 ML/MIN/1.73M*2
EGFRCR SERPLBLD CKD-EPI 2021: 37.1 ML/MIN/1.73M*2
EGFRCR SERPLBLD CKD-EPI 2021: 37.1 ML/MIN/1.73M*2
EGFRCR SERPLBLD CKD-EPI 2021: 39.9 ML/MIN/1.73M*2
EGFRCR SERPLBLD CKD-EPI 2021: 51.2 ML/MIN/1.73M*2
EOSINOPHIL # BLD: 0.09 K/UL (ref 0.04–0.54)
EOSINOPHIL # BLD: 0.09 K/UL (ref 0.04–0.54)
EOSINOPHIL # BLD: 0.11 K/UL (ref 0.04–0.54)
EOSINOPHIL NFR BLD: 1.6 %
EOSINOPHIL NFR BLD: 2 %
EOSINOPHIL NFR BLD: 2.9 %
ERYTHROCYTE [DISTWIDTH] IN BLOOD BY AUTOMATED COUNT: 17.8 % (ref 11.6–14.4)
ERYTHROCYTE [DISTWIDTH] IN BLOOD BY AUTOMATED COUNT: 17.9 % (ref 11.6–14.4)
ERYTHROCYTE [DISTWIDTH] IN BLOOD BY AUTOMATED COUNT: 18 % (ref 11.6–14.4)
ERYTHROCYTE [DISTWIDTH] IN BLOOD BY AUTOMATED COUNT: 18.1 % (ref 11.6–14.4)
ERYTHROCYTE [DISTWIDTH] IN BLOOD BY AUTOMATED COUNT: 18.1 % (ref 11.6–14.4)
ERYTHROCYTE [DISTWIDTH] IN BLOOD BY AUTOMATED COUNT: 19.3 % (ref 11.6–14.4)
FLUAV RNA SPEC QL NAA+PROBE: NEGATIVE
FLUBV RNA SPEC QL NAA+PROBE: NEGATIVE
FOLATE SERPL-MCNC: 16.6 NG/ML
GLUCOSE BLD-MCNC: 100 MG/DL (ref 70–99)
GLUCOSE BLD-MCNC: 147 MG/DL (ref 70–99)
GLUCOSE BLD-MCNC: 158 MG/DL (ref 70–99)
GLUCOSE BLD-MCNC: 180 MG/DL (ref 70–99)
GLUCOSE BLD-MCNC: 192 MG/DL (ref 70–99)
GLUCOSE BLD-MCNC: 202 MG/DL (ref 70–99)
GLUCOSE BLD-MCNC: 98 MG/DL (ref 70–99)
GLUCOSE BLD-MCNC: 99 MG/DL (ref 70–99)
GLUCOSE SERPL-MCNC: 136 MG/DL (ref 70–99)
GLUCOSE SERPL-MCNC: 149 MG/DL (ref 70–99)
GLUCOSE SERPL-MCNC: 151 MG/DL (ref 70–99)
GLUCOSE SERPL-MCNC: 172 MG/DL (ref 70–99)
GLUCOSE SERPL-MCNC: 205 MG/DL (ref 70–99)
GLUCOSE SERPL-MCNC: 87 MG/DL (ref 70–99)
GLUCOSE SERPL-MCNC: 90 MG/DL (ref 70–99)
GLUCOSE UR STRIP.AUTO-MCNC: NEGATIVE MG/DL
HCT VFR BLD AUTO: 26.1 % (ref 40.1–51)
HCT VFR BLD AUTO: 26.9 % (ref 40.1–51)
HCT VFR BLD AUTO: 27.3 % (ref 40.1–51)
HCT VFR BLD AUTO: 27.6 % (ref 40.1–51)
HCT VFR BLD AUTO: 28.3 % (ref 40.1–51)
HCT VFR BLD AUTO: 28.4 % (ref 40.1–51)
HGB BLD-MCNC: 8.1 G/DL (ref 13.7–17.5)
HGB BLD-MCNC: 8.3 G/DL (ref 13.7–17.5)
HGB BLD-MCNC: 8.5 G/DL (ref 13.7–17.5)
HGB BLD-MCNC: 8.6 G/DL (ref 13.7–17.5)
HGB BLD-MCNC: 8.6 G/DL (ref 13.7–17.5)
HGB BLD-MCNC: 8.8 G/DL (ref 13.7–17.5)
HGB UR QL STRIP.AUTO: ABNORMAL
HYALINE CASTS #/AREA URNS LPF: ABNORMAL /LPF
HYPOCHROMIA BLD QL SMEAR: ABNORMAL
IMM GRANULOCYTES # BLD AUTO: 0.01 K/UL (ref 0–0.08)
IMM GRANULOCYTES # BLD AUTO: 0.03 K/UL (ref 0–0.08)
IMM GRANULOCYTES # BLD AUTO: 0.04 K/UL (ref 0–0.08)
IMM GRANULOCYTES NFR BLD AUTO: 0.2 %
IMM GRANULOCYTES NFR BLD AUTO: 0.5 %
IMM GRANULOCYTES NFR BLD AUTO: 1 %
INR PPP: 1.5
INR PPP: 1.6
INR PPP: 1.7
ISBT CODE: 5100
KETONES UR STRIP.AUTO-MCNC: NEGATIVE MG/DL
LACTATE SERPL-SCNC: 1.6 MMOL/L (ref 0.4–2)
LACTATE SERPL-SCNC: 1.8 MMOL/L (ref 0.4–2)
LEUKOCYTE ESTERASE UR QL STRIP.AUTO: 3
LYMPHOCYTES # BLD: 0.38 K/UL (ref 1.2–3.5)
LYMPHOCYTES # BLD: 0.43 K/UL (ref 1.2–3.5)
LYMPHOCYTES # BLD: 0.47 K/UL (ref 1.2–3.5)
LYMPHOCYTES NFR BLD: 12.3 %
LYMPHOCYTES NFR BLD: 6.7 %
LYMPHOCYTES NFR BLD: 9.6 %
MACROCYTES BLD QL SMEAR: ABNORMAL
MAGNESIUM SERPL-MCNC: 1.8 MG/DL (ref 1.8–2.5)
MAGNESIUM SERPL-MCNC: 2.2 MG/DL (ref 1.8–2.5)
MCH RBC QN AUTO: 32.8 PG (ref 28–33.2)
MCH RBC QN AUTO: 33.1 PG (ref 28–33.2)
MCH RBC QN AUTO: 33.1 PG (ref 28–33.2)
MCH RBC QN AUTO: 33.3 PG (ref 28–33.2)
MCH RBC QN AUTO: 33.7 PG (ref 28–33.2)
MCH RBC QN AUTO: 33.9 PG (ref 28–33.2)
MCHC RBC AUTO-ENTMCNC: 30.3 G/DL (ref 32.2–36.5)
MCHC RBC AUTO-ENTMCNC: 30.8 G/DL (ref 32.2–36.5)
MCHC RBC AUTO-ENTMCNC: 30.9 G/DL (ref 32.2–36.5)
MCHC RBC AUTO-ENTMCNC: 31 G/DL (ref 32.2–36.5)
MCHC RBC AUTO-ENTMCNC: 31.1 G/DL (ref 32.2–36.5)
MCHC RBC AUTO-ENTMCNC: 31.5 G/DL (ref 32.2–36.5)
MCV RBC AUTO: 104.2 FL (ref 83–98)
MCV RBC AUTO: 106.5 FL (ref 83–98)
MCV RBC AUTO: 108.2 FL (ref 83–98)
MCV RBC AUTO: 108.4 FL (ref 83–98)
MCV RBC AUTO: 109.2 FL (ref 83–98)
MCV RBC AUTO: 109.8 FL (ref 83–98)
MONOCYTES # BLD: 0.46 K/UL (ref 0.3–1)
MONOCYTES # BLD: 0.51 K/UL (ref 0.3–1)
MONOCYTES # BLD: 0.74 K/UL (ref 0.3–1)
MONOCYTES NFR BLD: 11.3 %
MONOCYTES NFR BLD: 12.1 %
MONOCYTES NFR BLD: 13 %
MUCOUS THREADS URNS QL MICRO: ABNORMAL /LPF
NEUTROPHILS # BLD: 2.7 K/UL (ref 1.7–7)
NEUTROPHILS # BLD: 3.43 K/UL (ref 1.7–7)
NEUTROPHILS # BLD: 4.43 K/UL (ref 1.7–7)
NEUTS SEG NFR BLD: 70.9 %
NEUTS SEG NFR BLD: 76.2 %
NEUTS SEG NFR BLD: 77.7 %
NITRITE UR QL STRIP.AUTO: NEGATIVE
NRBC BLD-RTO: 0 %
OVALOCYTES BLD QL SMEAR: ABNORMAL
PH UR STRIP.AUTO: 6 [PH]
PHOSPHATE SERPL-MCNC: 3.5 MG/DL (ref 2.4–4.7)
PLAT MORPH BLD: NORMAL
PLAT MORPH BLD: NORMAL
PLATELET # BLD AUTO: 82 K/UL (ref 150–350)
PLATELET # BLD AUTO: 83 K/UL (ref 150–350)
PLATELET # BLD AUTO: 85 K/UL (ref 150–350)
PLATELET # BLD AUTO: 91 K/UL (ref 150–350)
PLATELET # BLD AUTO: 95 K/UL (ref 150–350)
PLATELET # BLD AUTO: 95 K/UL (ref 150–350)
PLATELET # BLD EST: ABNORMAL 10*3/UL
PMV BLD AUTO: 10.4 FL (ref 9.4–12.4)
PMV BLD AUTO: 10.8 FL (ref 9.4–12.4)
PMV BLD AUTO: 10.9 FL (ref 9.4–12.4)
PMV BLD AUTO: 11.2 FL (ref 9.4–12.4)
PMV BLD AUTO: 11.3 FL (ref 9.4–12.4)
PMV BLD AUTO: 11.5 FL (ref 9.4–12.4)
POCT TEST: ABNORMAL
POCT TEST: NORMAL
POCT TEST: NORMAL
POIKILOCYTOSIS BLD QL SMEAR: ABNORMAL
POIKILOCYTOSIS BLD QL SMEAR: ABNORMAL
POLYCHROMASIA BLD QL SMEAR: ABNORMAL
POTASSIUM SERPL-SCNC: 3.8 MEQ/L (ref 3.5–5.1)
POTASSIUM SERPL-SCNC: 3.9 MEQ/L (ref 3.5–5.1)
POTASSIUM SERPL-SCNC: 3.9 MEQ/L (ref 3.5–5.1)
POTASSIUM SERPL-SCNC: 4 MEQ/L (ref 3.5–5.1)
POTASSIUM SERPL-SCNC: 4.1 MEQ/L (ref 3.5–5.1)
POTASSIUM SERPL-SCNC: 4.4 MEQ/L (ref 3.5–5.1)
POTASSIUM SERPL-SCNC: 4.4 MEQ/L (ref 3.5–5.1)
PRODUCT CODE: NORMAL
PRODUCT STATUS: NORMAL
PROT SERPL-MCNC: 6.6 G/DL (ref 6–8.2)
PROT SERPL-MCNC: 7 G/DL (ref 6–8.2)
PROT SERPL-MCNC: 7.7 G/DL (ref 6–8.2)
PROT UR QL STRIP.AUTO: ABNORMAL
PROTHROMBIN TIME: 17.5 SEC (ref 12.2–14.5)
PROTHROMBIN TIME: 18.4 SEC (ref 12.2–14.5)
PROTHROMBIN TIME: 19.6 SEC (ref 12.2–14.5)
QRS DURATION: 168
QRS DURATION: 168
QT INTERVAL: 480
QT INTERVAL: 498
QTC CALCULATION(BAZETT): 513
QTC CALCULATION(BAZETT): 528
R AXIS: 85
R AXIS: 90
RBC # BLD AUTO: 2.45 M/UL (ref 4.5–5.8)
RBC # BLD AUTO: 2.45 M/UL (ref 4.5–5.8)
RBC # BLD AUTO: 2.55 M/UL (ref 4.5–5.8)
RBC # BLD AUTO: 2.6 M/UL (ref 4.5–5.8)
RBC # BLD AUTO: 2.61 M/UL (ref 4.5–5.8)
RBC # BLD AUTO: 2.62 M/UL (ref 4.5–5.8)
RBC #/AREA URNS HPF: ABNORMAL /HPF
RSV RNA SPEC QL NAA+PROBE: NEGATIVE
SARS-COV-2 RNA RESP QL NAA+PROBE: NEGATIVE
SODIUM SERPL-SCNC: 136 MEQ/L (ref 136–145)
SODIUM SERPL-SCNC: 137 MEQ/L (ref 136–145)
SODIUM SERPL-SCNC: 138 MEQ/L (ref 136–145)
SODIUM SERPL-SCNC: 138 MEQ/L (ref 136–145)
SODIUM SERPL-SCNC: 142 MEQ/L (ref 136–145)
SODIUM SERPL-SCNC: 142 MEQ/L (ref 136–145)
SODIUM SERPL-SCNC: 143 MEQ/L (ref 136–145)
SP GR UR REFRACT.AUTO: 1.01
SPECIMEN EXP DATE BLD: NORMAL
SQUAMOUS URNS QL MICRO: ABNORMAL /HPF
T WAVE AXIS: -34
T WAVE AXIS: -59
TROPONIN I SERPL HS-MCNC: 28.2 PG/ML
TSH SERPL DL<=0.05 MIU/L-ACNC: 0.87 MIU/L (ref 0.34–5.6)
UNIT ABO: NORMAL
UNIT ID: NORMAL
UNIT RH: POSITIVE
UROBILINOGEN UR STRIP-ACNC: 0.2 EU/DL
VENTRICULAR RATE: 64
VENTRICULAR RATE: 73
VIT B12 SERPL-MCNC: 823 PG/ML (ref 180–914)
WBC # BLD AUTO: 3.81 K/UL (ref 3.8–10.5)
WBC # BLD AUTO: 4.5 K/UL (ref 3.8–10.5)
WBC # BLD AUTO: 4.52 K/UL (ref 3.8–10.5)
WBC # BLD AUTO: 4.82 K/UL (ref 3.8–10.5)
WBC # BLD AUTO: 5.04 K/UL (ref 3.8–10.5)
WBC # BLD AUTO: 5.7 K/UL (ref 3.8–10.5)
WBC #/AREA URNS HPF: ABNORMAL /HPF
YEAST #/AREA URNS HPF: ABNORMAL /HPF
YEAST HYPHAE #/AREA URNS HPF: 1 /HPF

## 2025-01-01 PROCEDURE — 85027 COMPLETE CBC AUTOMATED: CPT | Performed by: HOSPITALIST

## 2025-01-01 PROCEDURE — 0W9B3ZZ DRAINAGE OF LEFT PLEURAL CAVITY, PERCUTANEOUS APPROACH: ICD-10-PCS | Performed by: RADIOLOGY

## 2025-01-01 PROCEDURE — 25800000 HC PHARMACY IV SOLUTIONS: Performed by: STUDENT IN AN ORGANIZED HEALTH CARE EDUCATION/TRAINING PROGRAM

## 2025-01-01 PROCEDURE — 83735 ASSAY OF MAGNESIUM: CPT | Performed by: STUDENT IN AN ORGANIZED HEALTH CARE EDUCATION/TRAINING PROGRAM

## 2025-01-01 PROCEDURE — 63700000 HC SELF-ADMINISTRABLE DRUG: Performed by: STUDENT IN AN ORGANIZED HEALTH CARE EDUCATION/TRAINING PROGRAM

## 2025-01-01 PROCEDURE — 80053 COMPREHEN METABOLIC PANEL: CPT | Performed by: EMERGENCY MEDICINE

## 2025-01-01 PROCEDURE — 99223 1ST HOSP IP/OBS HIGH 75: CPT | Mod: GV | Performed by: INTERNAL MEDICINE

## 2025-01-01 PROCEDURE — 63700000 HC SELF-ADMINISTRABLE DRUG

## 2025-01-01 PROCEDURE — 83735 ASSAY OF MAGNESIUM: CPT | Performed by: PHYSICIAN ASSISTANT

## 2025-01-01 PROCEDURE — 83605 ASSAY OF LACTIC ACID: CPT

## 2025-01-01 PROCEDURE — 21400000 HC ROOM AND CARE CCU/INTERMEDIATE

## 2025-01-01 PROCEDURE — 85025 COMPLETE CBC W/AUTO DIFF WBC: CPT | Performed by: EMERGENCY MEDICINE

## 2025-01-01 PROCEDURE — 87086 URINE CULTURE/COLONY COUNT: CPT

## 2025-01-01 PROCEDURE — 85610 PROTHROMBIN TIME: CPT | Performed by: HOSPITALIST

## 2025-01-01 PROCEDURE — 25800000 HC PHARMACY IV SOLUTIONS

## 2025-01-01 PROCEDURE — 25000000 HC PHARMACY GENERAL: Performed by: INTERNAL MEDICINE

## 2025-01-01 PROCEDURE — 82607 VITAMIN B-12: CPT | Performed by: STUDENT IN AN ORGANIZED HEALTH CARE EDUCATION/TRAINING PROGRAM

## 2025-01-01 PROCEDURE — 63600000 HC DRUGS/DETAIL CODE: Mod: JZ | Performed by: HOSPITALIST

## 2025-01-01 PROCEDURE — 25800000 HC PHARMACY IV SOLUTIONS: Performed by: HOSPITALIST

## 2025-01-01 PROCEDURE — 71045 X-RAY EXAM CHEST 1 VIEW: CPT

## 2025-01-01 PROCEDURE — 85025 COMPLETE CBC W/AUTO DIFF WBC: CPT

## 2025-01-01 PROCEDURE — 63700000 HC SELF-ADMINISTRABLE DRUG: Performed by: NURSE PRACTITIONER

## 2025-01-01 PROCEDURE — 63700000 HC SELF-ADMINISTRABLE DRUG: Performed by: HOSPITALIST

## 2025-01-01 PROCEDURE — 99233 SBSQ HOSP IP/OBS HIGH 50: CPT | Performed by: HOSPITALIST

## 2025-01-01 PROCEDURE — 71250 CT THORAX DX C-: CPT

## 2025-01-01 PROCEDURE — 63600000 HC DRUGS/DETAIL CODE: Mod: JZ

## 2025-01-01 PROCEDURE — 93005 ELECTROCARDIOGRAM TRACING: CPT

## 2025-01-01 PROCEDURE — 200200 PR NO CHARGE: Performed by: STUDENT IN AN ORGANIZED HEALTH CARE EDUCATION/TRAINING PROGRAM

## 2025-01-01 PROCEDURE — 97162 PT EVAL MOD COMPLEX 30 MIN: CPT | Mod: GP

## 2025-01-01 PROCEDURE — 25000000 HC PHARMACY GENERAL: Performed by: PHYSICIAN ASSISTANT

## 2025-01-01 PROCEDURE — 99232 SBSQ HOSP IP/OBS MODERATE 35: CPT | Performed by: INTERNAL MEDICINE

## 2025-01-01 PROCEDURE — 87186 SC STD MICRODIL/AGAR DIL: CPT | Performed by: EMERGENCY MEDICINE

## 2025-01-01 PROCEDURE — 99285 EMERGENCY DEPT VISIT HI MDM: CPT | Mod: 25

## 2025-01-01 PROCEDURE — 97166 OT EVAL MOD COMPLEX 45 MIN: CPT | Mod: GO

## 2025-01-01 PROCEDURE — 87637 SARSCOV2&INF A&B&RSV AMP PRB: CPT | Performed by: EMERGENCY MEDICINE

## 2025-01-01 PROCEDURE — 99223 1ST HOSP IP/OBS HIGH 75: CPT | Performed by: INTERNAL MEDICINE

## 2025-01-01 PROCEDURE — 85027 COMPLETE CBC AUTOMATED: CPT | Performed by: PHYSICIAN ASSISTANT

## 2025-01-01 PROCEDURE — 96365 THER/PROPH/DIAG IV INF INIT: CPT | Mod: 59

## 2025-01-01 PROCEDURE — 80053 COMPREHEN METABOLIC PANEL: CPT | Performed by: STUDENT IN AN ORGANIZED HEALTH CARE EDUCATION/TRAINING PROGRAM

## 2025-01-01 PROCEDURE — 1123F ACP DISCUSS/DSCN MKR DOCD: CPT | Performed by: NURSE PRACTITIONER

## 2025-01-01 PROCEDURE — 12000000 HC ROOM AND CARE MED/SURG

## 2025-01-01 PROCEDURE — 36415 COLL VENOUS BLD VENIPUNCTURE: CPT

## 2025-01-01 PROCEDURE — 84484 ASSAY OF TROPONIN QUANT: CPT | Performed by: EMERGENCY MEDICINE

## 2025-01-01 PROCEDURE — 99233 SBSQ HOSP IP/OBS HIGH 50: CPT | Performed by: INTERNAL MEDICINE

## 2025-01-01 PROCEDURE — 63600000 HC DRUGS/DETAIL CODE: Mod: JZ | Performed by: INTERNAL MEDICINE

## 2025-01-01 PROCEDURE — 99223 1ST HOSP IP/OBS HIGH 75: CPT | Performed by: STUDENT IN AN ORGANIZED HEALTH CARE EDUCATION/TRAINING PROGRAM

## 2025-01-01 PROCEDURE — 36415 COLL VENOUS BLD VENIPUNCTURE: CPT | Performed by: PHYSICIAN ASSISTANT

## 2025-01-01 PROCEDURE — 83605 ASSAY OF LACTIC ACID: CPT | Performed by: EMERGENCY MEDICINE

## 2025-01-01 PROCEDURE — 84100 ASSAY OF PHOSPHORUS: CPT | Performed by: STUDENT IN AN ORGANIZED HEALTH CARE EDUCATION/TRAINING PROGRAM

## 2025-01-01 PROCEDURE — 85730 THROMBOPLASTIN TIME PARTIAL: CPT

## 2025-01-01 PROCEDURE — 63700000 HC SELF-ADMINISTRABLE DRUG: Performed by: INTERNAL MEDICINE

## 2025-01-01 PROCEDURE — 99223 1ST HOSP IP/OBS HIGH 75: CPT | Performed by: NURSE PRACTITIONER

## 2025-01-01 PROCEDURE — 63600000 HC DRUGS/DETAIL CODE: Mod: JZ | Performed by: STUDENT IN AN ORGANIZED HEALTH CARE EDUCATION/TRAINING PROGRAM

## 2025-01-01 PROCEDURE — 63700000 HC SELF-ADMINISTRABLE DRUG: Performed by: PHYSICIAN ASSISTANT

## 2025-01-01 PROCEDURE — 36100330 IR THORACENTESIS

## 2025-01-01 PROCEDURE — 99999 PR OFFICE/OUTPT VISIT,PROCEDURE ONLY: CPT | Mod: GV | Performed by: INTERNAL MEDICINE

## 2025-01-01 PROCEDURE — 80048 BASIC METABOLIC PNL TOTAL CA: CPT | Performed by: PHYSICIAN ASSISTANT

## 2025-01-01 PROCEDURE — 36415 COLL VENOUS BLD VENIPUNCTURE: CPT | Performed by: STUDENT IN AN ORGANIZED HEALTH CARE EDUCATION/TRAINING PROGRAM

## 2025-01-01 PROCEDURE — 63600000 HC DRUGS/DETAIL CODE: Mod: JZ | Performed by: NURSE PRACTITIONER

## 2025-01-01 PROCEDURE — 63600105 HC IODINE BASED CONTRAST: Mod: JZ

## 2025-01-01 PROCEDURE — 93005 ELECTROCARDIOGRAM TRACING: CPT | Performed by: EMERGENCY MEDICINE

## 2025-01-01 PROCEDURE — 36415 COLL VENOUS BLD VENIPUNCTURE: CPT | Performed by: HOSPITALIST

## 2025-01-01 PROCEDURE — 85025 COMPLETE CBC W/AUTO DIFF WBC: CPT | Performed by: STUDENT IN AN ORGANIZED HEALTH CARE EDUCATION/TRAINING PROGRAM

## 2025-01-01 PROCEDURE — 80048 BASIC METABOLIC PNL TOTAL CA: CPT | Performed by: HOSPITALIST

## 2025-01-01 PROCEDURE — 84443 ASSAY THYROID STIM HORMONE: CPT | Performed by: STUDENT IN AN ORGANIZED HEALTH CARE EDUCATION/TRAINING PROGRAM

## 2025-01-01 PROCEDURE — 74176 CT ABD & PELVIS W/O CONTRAST: CPT

## 2025-01-01 PROCEDURE — 200200 PR NO CHARGE: Performed by: HOSPITALIST

## 2025-01-01 PROCEDURE — 99239 HOSP IP/OBS DSCHRG MGMT >30: CPT | Performed by: HOSPITALIST

## 2025-01-01 PROCEDURE — 97530 THERAPEUTIC ACTIVITIES: CPT | Mod: GO

## 2025-01-01 PROCEDURE — 25800000 HC PHARMACY IV SOLUTIONS: Performed by: EMERGENCY MEDICINE

## 2025-01-01 PROCEDURE — 82746 ASSAY OF FOLIC ACID SERUM: CPT | Performed by: STUDENT IN AN ORGANIZED HEALTH CARE EDUCATION/TRAINING PROGRAM

## 2025-01-01 PROCEDURE — 63600000 HC DRUGS/DETAIL CODE: Mod: JW,TB | Performed by: INTERNAL MEDICINE

## 2025-01-01 PROCEDURE — 83735 ASSAY OF MAGNESIUM: CPT | Performed by: EMERGENCY MEDICINE

## 2025-01-01 PROCEDURE — 85610 PROTHROMBIN TIME: CPT | Performed by: PHYSICIAN ASSISTANT

## 2025-01-01 PROCEDURE — 85027 COMPLETE CBC AUTOMATED: CPT

## 2025-01-01 PROCEDURE — 81001 URINALYSIS AUTO W/SCOPE: CPT | Performed by: EMERGENCY MEDICINE

## 2025-01-01 PROCEDURE — 80053 COMPREHEN METABOLIC PANEL: CPT

## 2025-01-01 PROCEDURE — 82550 ASSAY OF CK (CPK): CPT | Performed by: EMERGENCY MEDICINE

## 2025-01-01 PROCEDURE — 93010 ELECTROCARDIOGRAM REPORT: CPT | Performed by: INTERNAL MEDICINE

## 2025-01-01 PROCEDURE — 81003 URINALYSIS AUTO W/O SCOPE: CPT

## 2025-01-01 PROCEDURE — 87077 CULTURE AEROBIC IDENTIFY: CPT | Performed by: EMERGENCY MEDICINE

## 2025-01-01 PROCEDURE — 0W993ZZ DRAINAGE OF RIGHT PLEURAL CAVITY, PERCUTANEOUS APPROACH: ICD-10-PCS | Performed by: RADIOLOGY

## 2025-01-01 PROCEDURE — 96360 HYDRATION IV INFUSION INIT: CPT

## 2025-01-01 PROCEDURE — 80048 BASIC METABOLIC PNL TOTAL CA: CPT

## 2025-01-01 PROCEDURE — 87040 BLOOD CULTURE FOR BACTERIA: CPT | Performed by: EMERGENCY MEDICINE

## 2025-01-01 PROCEDURE — 63600000 HC DRUGS/DETAIL CODE: Mod: JZ | Performed by: PHYSICIAN ASSISTANT

## 2025-01-01 PROCEDURE — 85610 PROTHROMBIN TIME: CPT

## 2025-01-01 RX ORDER — IOPAMIDOL 755 MG/ML
100 INJECTION, SOLUTION INTRAVASCULAR
Status: COMPLETED | OUTPATIENT
Start: 2025-01-01 | End: 2025-01-01

## 2025-01-01 RX ORDER — SCOPOLAMINE 1 MG/3D
1 PATCH, EXTENDED RELEASE TRANSDERMAL
Status: DISCONTINUED | OUTPATIENT
Start: 2025-01-01 | End: 2025-01-01 | Stop reason: HOSPADM

## 2025-01-01 RX ORDER — FERROUS SULFATE 325(65) MG
325 TABLET ORAL
Status: DISCONTINUED | OUTPATIENT
Start: 2025-01-01 | End: 2025-01-01

## 2025-01-01 RX ORDER — BENZONATATE 100 MG/1
200 CAPSULE ORAL 3 TIMES DAILY PRN
Status: DISCONTINUED | OUTPATIENT
Start: 2025-01-01 | End: 2025-01-01

## 2025-01-01 RX ORDER — SIMETHICONE 80 MG
80 TABLET,CHEWABLE ORAL EVERY 6 HOURS PRN
Status: DISCONTINUED | OUTPATIENT
Start: 2025-01-01 | End: 2025-01-01 | Stop reason: HOSPADM

## 2025-01-01 RX ORDER — PANTOPRAZOLE SODIUM 20 MG/1
20 TABLET, DELAYED RELEASE ORAL DAILY
Status: DISCONTINUED | OUTPATIENT
Start: 2025-01-01 | End: 2025-01-01

## 2025-01-01 RX ORDER — HALOPERIDOL LACTATE 5 MG/ML
1 INJECTION, SOLUTION INTRAMUSCULAR EVERY 4 HOURS PRN
Status: DISCONTINUED | OUTPATIENT
Start: 2025-01-01 | End: 2025-01-01

## 2025-01-01 RX ORDER — MORPHINE SULFATE 2 MG/ML
4 INJECTION, SOLUTION INTRAMUSCULAR; INTRAVENOUS
Status: DISCONTINUED | OUTPATIENT
Start: 2025-01-01 | End: 2025-01-01 | Stop reason: HOSPADM

## 2025-01-01 RX ORDER — IPRATROPIUM BROMIDE AND ALBUTEROL SULFATE 2.5; .5 MG/3ML; MG/3ML
3 SOLUTION RESPIRATORY (INHALATION) EVERY 6 HOURS
Status: DISCONTINUED | OUTPATIENT
Start: 2025-01-01 | End: 2025-01-01

## 2025-01-01 RX ORDER — CALCIUM CARBONATE 200(500)MG
200 TABLET,CHEWABLE ORAL DAILY PRN
Status: DISCONTINUED | OUTPATIENT
Start: 2025-01-01 | End: 2025-01-01 | Stop reason: HOSPADM

## 2025-01-01 RX ORDER — CLONAZEPAM 0.5 MG/1
0.5 TABLET ORAL ONCE
Status: COMPLETED | OUTPATIENT
Start: 2025-01-01 | End: 2025-01-01

## 2025-01-01 RX ORDER — BUPROPION HYDROCHLORIDE 150 MG/1
150 TABLET ORAL DAILY
Status: DISCONTINUED | OUTPATIENT
Start: 2025-01-01 | End: 2025-01-01 | Stop reason: HOSPADM

## 2025-01-01 RX ORDER — GLYCOPYRROLATE 0.6MG/3ML
0.4 SYRINGE (ML) INTRAVENOUS EVERY 4 HOURS
Status: CANCELLED | OUTPATIENT
Start: 2025-01-01

## 2025-01-01 RX ORDER — FINASTERIDE 5 MG/1
5 TABLET, FILM COATED ORAL DAILY
Status: DISCONTINUED | OUTPATIENT
Start: 2025-01-01 | End: 2025-01-01

## 2025-01-01 RX ORDER — ARTIFICIAL TEARS 1; 2; 3 MG/ML; MG/ML; MG/ML
2 SOLUTION/ DROPS OPHTHALMIC 4 TIMES DAILY PRN
Status: DISCONTINUED | OUTPATIENT
Start: 2025-01-01 | End: 2025-06-06 | Stop reason: HOSPADM

## 2025-01-01 RX ORDER — GUAIFENESIN 100 MG/5ML
200 LIQUID ORAL EVERY 4 HOURS PRN
Status: DISCONTINUED | OUTPATIENT
Start: 2025-01-01 | End: 2025-01-01

## 2025-01-01 RX ORDER — MORPHINE SULFATE 10 MG/.5ML
5 SOLUTION ORAL EVERY 4 HOURS PRN
Status: DISCONTINUED | OUTPATIENT
Start: 2025-01-01 | End: 2025-01-01

## 2025-01-01 RX ORDER — HYDROMORPHONE HCL IN 0.9% NACL 50 MG/50ML
PATIENT CONTROLLED ANALGESIA SYRINGE INTRAVENOUS CONTINUOUS
Status: DISCONTINUED | OUTPATIENT
Start: 2025-01-01 | End: 2025-01-01

## 2025-01-01 RX ORDER — SODIUM CHLORIDE 0.9 % (FLUSH) 0.9 %
3 SYRINGE (ML) INJECTION AS NEEDED
Status: DISCONTINUED | OUTPATIENT
Start: 2025-01-01 | End: 2025-01-01 | Stop reason: HOSPADM

## 2025-01-01 RX ORDER — LATANOPROST 50 UG/ML
1 SOLUTION/ DROPS OPHTHALMIC NIGHTLY
Status: DISCONTINUED | OUTPATIENT
Start: 2025-01-01 | End: 2025-01-01

## 2025-01-01 RX ORDER — FINASTERIDE 5 MG/1
5 TABLET, FILM COATED ORAL DAILY
Status: DISCONTINUED | OUTPATIENT
Start: 2025-01-01 | End: 2025-01-01 | Stop reason: HOSPADM

## 2025-01-01 RX ORDER — HYDROMORPHONE HCL IN 0.9% NACL 50 MG/50ML
PATIENT CONTROLLED ANALGESIA SYRINGE INTRAVENOUS CONTINUOUS
Refills: 0 | Status: DISCONTINUED | OUTPATIENT
Start: 2025-01-01 | End: 2025-01-01 | Stop reason: HOSPADM

## 2025-01-01 RX ORDER — CEFUROXIME AXETIL 250 MG/1
250 TABLET ORAL 2 TIMES DAILY
Status: COMPLETED | OUTPATIENT
Start: 2025-01-01 | End: 2025-01-01

## 2025-01-01 RX ORDER — PNV NO.95/FERROUS FUM/FOLIC AC 28MG-0.8MG
500 TABLET ORAL EVERY MORNING
Status: DISCONTINUED | OUTPATIENT
Start: 2025-01-01 | End: 2025-01-01 | Stop reason: HOSPADM

## 2025-01-01 RX ORDER — THIAMINE HCL 100 MG
100 TABLET ORAL EVERY MORNING
Status: DISCONTINUED | OUTPATIENT
Start: 2025-01-01 | End: 2025-01-01 | Stop reason: HOSPADM

## 2025-01-01 RX ORDER — GLYCOPYRROLATE 0.6MG/3ML
0.1 SYRINGE (ML) INTRAVENOUS ONCE
Status: COMPLETED | OUTPATIENT
Start: 2025-01-01 | End: 2025-01-01

## 2025-01-01 RX ORDER — KETOROLAC TROMETHAMINE 15 MG/ML
15 INJECTION, SOLUTION INTRAMUSCULAR; INTRAVENOUS EVERY 6 HOURS PRN
Status: CANCELLED | OUTPATIENT
Start: 2025-01-01 | End: 2025-06-10

## 2025-01-01 RX ORDER — GLYCOPYRROLATE 0.6MG/3ML
0.4 SYRINGE (ML) INTRAVENOUS EVERY 4 HOURS
Status: DISCONTINUED | OUTPATIENT
Start: 2025-01-01 | End: 2025-06-06 | Stop reason: HOSPADM

## 2025-01-01 RX ORDER — HYDROMORPHONE HCL IN 0.9% NACL 50 MG/50ML
PATIENT CONTROLLED ANALGESIA SYRINGE INTRAVENOUS CONTINUOUS
Refills: 0 | Status: DISCONTINUED | OUTPATIENT
Start: 2025-01-01 | End: 2025-06-06 | Stop reason: HOSPADM

## 2025-01-01 RX ORDER — LEVOTHYROXINE SODIUM 100 UG/1
100 TABLET ORAL
Status: DISCONTINUED | OUTPATIENT
Start: 2025-01-01 | End: 2025-01-01 | Stop reason: HOSPADM

## 2025-01-01 RX ORDER — MELATONIN 5 MG
10 CAPSULE ORAL NIGHTLY
Status: DISCONTINUED | OUTPATIENT
Start: 2025-01-01 | End: 2025-01-01 | Stop reason: HOSPADM

## 2025-01-01 RX ORDER — ARTIFICIAL TEARS 1; 2; 3 MG/ML; MG/ML; MG/ML
2 SOLUTION/ DROPS OPHTHALMIC 4 TIMES DAILY PRN
Status: CANCELLED | OUTPATIENT
Start: 2025-01-01

## 2025-01-01 RX ORDER — CARBOXYMETHYLCELLULOSE SODIUM 5 MG/ML
1 SOLUTION/ DROPS OPHTHALMIC
Status: DISCONTINUED | OUTPATIENT
Start: 2025-01-01 | End: 2025-01-01

## 2025-01-01 RX ORDER — PANTOPRAZOLE SODIUM 20 MG/1
20 TABLET, DELAYED RELEASE ORAL DAILY
Status: DISCONTINUED | OUTPATIENT
Start: 2025-01-01 | End: 2025-01-01 | Stop reason: HOSPADM

## 2025-01-01 RX ORDER — ATROPINE SULFATE 10 MG/ML
2 SOLUTION/ DROPS OPHTHALMIC 4 TIMES DAILY
Status: DISCONTINUED | OUTPATIENT
Start: 2025-01-01 | End: 2025-06-06 | Stop reason: HOSPADM

## 2025-01-01 RX ORDER — CEFUROXIME AXETIL 250 MG/1
250 TABLET ORAL 2 TIMES DAILY
Qty: 6 TABLET | Refills: 0 | Status: SHIPPED | OUTPATIENT
Start: 2025-01-01 | End: 2025-01-01

## 2025-01-01 RX ORDER — TAMSULOSIN HYDROCHLORIDE 0.4 MG/1
0.4 CAPSULE ORAL EVERY MORNING
Status: DISCONTINUED | OUTPATIENT
Start: 2025-01-01 | End: 2025-01-01 | Stop reason: HOSPADM

## 2025-01-01 RX ORDER — MORPHINE SULFATE 2 MG/ML
4 INJECTION, SOLUTION INTRAMUSCULAR; INTRAVENOUS
Status: DISCONTINUED | OUTPATIENT
Start: 2025-01-01 | End: 2025-01-01

## 2025-01-01 RX ORDER — BISACODYL 10 MG/1
10 SUPPOSITORY RECTAL DAILY PRN
Status: CANCELLED | OUTPATIENT
Start: 2025-01-01

## 2025-01-01 RX ORDER — LORAZEPAM 2 MG/ML
1 INJECTION INTRAMUSCULAR EVERY 4 HOURS PRN
Status: DISCONTINUED | OUTPATIENT
Start: 2025-01-01 | End: 2025-01-01 | Stop reason: HOSPADM

## 2025-01-01 RX ORDER — PANTOPRAZOLE SODIUM 40 MG/10ML
40 INJECTION, POWDER, LYOPHILIZED, FOR SOLUTION INTRAVENOUS ONCE
Status: COMPLETED | OUTPATIENT
Start: 2025-01-01 | End: 2025-01-01

## 2025-01-01 RX ORDER — ESCITALOPRAM OXALATE 5 MG/1
5 TABLET ORAL EVERY MORNING
Status: DISCONTINUED | OUTPATIENT
Start: 2025-01-01 | End: 2025-01-01 | Stop reason: HOSPADM

## 2025-01-01 RX ORDER — LORAZEPAM 2 MG/ML
1 INJECTION INTRAMUSCULAR EVERY 4 HOURS
Status: DISCONTINUED | OUTPATIENT
Start: 2025-01-01 | End: 2025-01-01

## 2025-01-01 RX ORDER — LEVOTHYROXINE SODIUM 100 UG/1
100 TABLET ORAL
Status: DISCONTINUED | OUTPATIENT
Start: 2025-01-01 | End: 2025-01-01

## 2025-01-01 RX ORDER — KETOROLAC TROMETHAMINE 15 MG/ML
15 INJECTION, SOLUTION INTRAMUSCULAR; INTRAVENOUS EVERY 6 HOURS PRN
Status: DISCONTINUED | OUTPATIENT
Start: 2025-01-01 | End: 2025-06-06 | Stop reason: HOSPADM

## 2025-01-01 RX ORDER — HYDROMORPHONE HCL IN 0.9% NACL 50 MG/50ML
PATIENT CONTROLLED ANALGESIA SYRINGE INTRAVENOUS CONTINUOUS
Refills: 0 | Status: CANCELLED | OUTPATIENT
Start: 2025-01-01 | End: 2025-06-19

## 2025-01-01 RX ORDER — ASPIRIN 81 MG/1
81 TABLET ORAL DAILY
Status: DISCONTINUED | OUTPATIENT
Start: 2025-01-01 | End: 2025-01-01

## 2025-01-01 RX ORDER — SCOPOLAMINE 1 MG/3D
1 PATCH, EXTENDED RELEASE TRANSDERMAL
Status: DISCONTINUED | OUTPATIENT
Start: 2025-06-07 | End: 2025-06-06 | Stop reason: HOSPADM

## 2025-01-01 RX ORDER — LATANOPROST 50 UG/ML
1 SOLUTION/ DROPS OPHTHALMIC NIGHTLY
Status: DISCONTINUED | OUTPATIENT
Start: 2025-01-01 | End: 2025-01-01 | Stop reason: HOSPADM

## 2025-01-01 RX ORDER — CALCIUM CARBONATE 200(500)MG
200 TABLET,CHEWABLE ORAL DAILY
Status: DISCONTINUED | OUTPATIENT
Start: 2025-01-01 | End: 2025-01-01

## 2025-01-01 RX ORDER — GLYCOPYRROLATE 1 MG/1
1 TABLET ORAL 3 TIMES DAILY
Status: DISCONTINUED | OUTPATIENT
Start: 2025-01-01 | End: 2025-01-01

## 2025-01-01 RX ORDER — CLONAZEPAM 0.5 MG/1
0.5 TABLET ORAL NIGHTLY PRN
Status: DISCONTINUED | OUTPATIENT
Start: 2025-01-01 | End: 2025-01-01

## 2025-01-01 RX ORDER — TAMSULOSIN HYDROCHLORIDE 0.4 MG/1
0.4 CAPSULE ORAL DAILY
Status: DISCONTINUED | OUTPATIENT
Start: 2025-01-01 | End: 2025-01-01 | Stop reason: HOSPADM

## 2025-01-01 RX ORDER — TAMSULOSIN HYDROCHLORIDE 0.4 MG/1
0.4 CAPSULE ORAL EVERY MORNING
Status: DISCONTINUED | OUTPATIENT
Start: 2025-01-01 | End: 2025-01-01

## 2025-01-01 RX ORDER — POTASSIUM CHLORIDE 1500 MG/1
10 TABLET, EXTENDED RELEASE ORAL 2 TIMES DAILY
COMMUNITY
End: 2025-01-01 | Stop reason: HOSPADM

## 2025-01-01 RX ORDER — GLYCOPYRROLATE 0.6MG/3ML
0.4 SYRINGE (ML) INTRAVENOUS EVERY 2 HOUR PRN
Status: DISCONTINUED | OUTPATIENT
Start: 2025-01-01 | End: 2025-06-06 | Stop reason: HOSPADM

## 2025-01-01 RX ORDER — GUAIFENESIN 600 MG/1
600 TABLET, EXTENDED RELEASE ORAL 2 TIMES DAILY
Status: DISCONTINUED | OUTPATIENT
Start: 2025-01-01 | End: 2025-01-01

## 2025-01-01 RX ORDER — BUPROPION HYDROCHLORIDE 150 MG/1
150 TABLET ORAL DAILY
Status: DISCONTINUED | OUTPATIENT
Start: 2025-01-01 | End: 2025-01-01

## 2025-01-01 RX ORDER — GLYCOPYRROLATE 0.6MG/3ML
0.4 SYRINGE (ML) INTRAVENOUS EVERY 4 HOURS
Status: DISCONTINUED | OUTPATIENT
Start: 2025-01-01 | End: 2025-01-01 | Stop reason: HOSPADM

## 2025-01-01 RX ORDER — CARBOXYMETHYLCELLULOSE SODIUM 5 MG/ML
1 SOLUTION/ DROPS OPHTHALMIC
Status: DISCONTINUED | OUTPATIENT
Start: 2025-01-01 | End: 2025-01-01 | Stop reason: HOSPADM

## 2025-01-01 RX ORDER — THIAMINE HCL 100 MG
100 TABLET ORAL EVERY MORNING
Status: DISCONTINUED | OUTPATIENT
Start: 2025-01-01 | End: 2025-01-01

## 2025-01-01 RX ORDER — TORSEMIDE 10 MG/1
20 TABLET ORAL 2 TIMES DAILY
Status: DISCONTINUED | OUTPATIENT
Start: 2025-01-01 | End: 2025-01-01 | Stop reason: HOSPADM

## 2025-01-01 RX ORDER — BISACODYL 10 MG/1
10 SUPPOSITORY RECTAL DAILY PRN
Status: DISCONTINUED | OUTPATIENT
Start: 2025-01-01 | End: 2025-06-06 | Stop reason: HOSPADM

## 2025-01-01 RX ORDER — HALOPERIDOL LACTATE 5 MG/ML
1 INJECTION, SOLUTION INTRAMUSCULAR EVERY 4 HOURS PRN
Status: CANCELLED | OUTPATIENT
Start: 2025-01-01

## 2025-01-01 RX ORDER — LANOLIN ALCOHOL/MO/W.PET/CERES
500 CREAM (GRAM) TOPICAL EVERY MORNING
Status: DISCONTINUED | OUTPATIENT
Start: 2025-01-01 | End: 2025-01-01

## 2025-01-01 RX ORDER — CLONAZEPAM 0.5 MG/1
0.5 TABLET ORAL NIGHTLY PRN
Status: DISCONTINUED | OUTPATIENT
Start: 2025-01-01 | End: 2025-01-01 | Stop reason: HOSPADM

## 2025-01-01 RX ORDER — LIDOCAINE HYDROCHLORIDE 10 MG/ML
INJECTION, SOLUTION INFILTRATION; PERINEURAL
Status: COMPLETED | OUTPATIENT
Start: 2025-01-01 | End: 2025-01-01

## 2025-01-01 RX ORDER — MORPHINE SULFATE 2 MG/ML
2 INJECTION, SOLUTION INTRAMUSCULAR; INTRAVENOUS ONCE
Status: COMPLETED | OUTPATIENT
Start: 2025-01-01 | End: 2025-01-01

## 2025-01-01 RX ORDER — LORAZEPAM 2 MG/ML
1 INJECTION INTRAMUSCULAR
Status: DISCONTINUED | OUTPATIENT
Start: 2025-01-01 | End: 2025-06-06 | Stop reason: HOSPADM

## 2025-01-01 RX ORDER — LORAZEPAM 2 MG/ML
1 INJECTION INTRAMUSCULAR
Status: CANCELLED | OUTPATIENT
Start: 2025-01-01

## 2025-01-01 RX ORDER — MELATONIN 5 MG
10 CAPSULE ORAL NIGHTLY
Status: DISCONTINUED | OUTPATIENT
Start: 2025-01-01 | End: 2025-01-01

## 2025-01-01 RX ORDER — LORAZEPAM 2 MG/ML
1 CONCENTRATE ORAL EVERY 4 HOURS PRN
Status: DISCONTINUED | OUTPATIENT
Start: 2025-01-01 | End: 2025-01-01

## 2025-01-01 RX ORDER — SCOPOLAMINE 1 MG/3D
1 PATCH, EXTENDED RELEASE TRANSDERMAL
Status: CANCELLED | OUTPATIENT
Start: 2025-06-07

## 2025-01-01 RX ORDER — ASPIRIN 81 MG/1
81 TABLET ORAL DAILY
Status: DISCONTINUED | OUTPATIENT
Start: 2025-01-01 | End: 2025-01-01 | Stop reason: HOSPADM

## 2025-01-01 RX ORDER — FERROUS SULFATE 325(65) MG
325 TABLET ORAL
Status: DISCONTINUED | OUTPATIENT
Start: 2025-01-01 | End: 2025-01-01 | Stop reason: HOSPADM

## 2025-01-01 RX ADMIN — CARBOXYMETHYLCELLULOSE SODIUM 1 DROP: 5 SOLUTION/ DROPS OPHTHALMIC at 08:15

## 2025-01-01 RX ADMIN — MEROPENEM 1000 MG: 1 INJECTION, POWDER, FOR SOLUTION INTRAVENOUS at 16:07

## 2025-01-01 RX ADMIN — Medication: at 14:28

## 2025-01-01 RX ADMIN — THIAMINE HCL TAB 100 MG 100 MG: 100 TAB at 08:29

## 2025-01-01 RX ADMIN — IOPAMIDOL 100 ML: 755 INJECTION, SOLUTION INTRAVENOUS at 18:39

## 2025-01-01 RX ADMIN — Medication 1 DROP: at 16:00

## 2025-01-01 RX ADMIN — BENZOCAINE AND MENTHOL 1 LOZENGE: 15; 2.6 LOZENGE ORAL at 21:20

## 2025-01-01 RX ADMIN — PIPERACILLIN AND TAZOBACTAM 4.5 G: 4; .5 INJECTION, POWDER, LYOPHILIZED, FOR SOLUTION INTRAVENOUS at 03:14

## 2025-01-01 RX ADMIN — CLONAZEPAM 0.5 MG: 0.5 TABLET ORAL at 03:42

## 2025-01-01 RX ADMIN — FINASTERIDE 5 MG: 5 TABLET, FILM COATED ORAL at 08:33

## 2025-01-01 RX ADMIN — CYANOCOBALAMIN TAB 1000 MCG 500 MCG: 1000 TAB at 08:40

## 2025-01-01 RX ADMIN — FERROUS SULFATE TAB 325 MG (65 MG ELEMENTAL FE) 325 MG: 325 (65 FE) TAB at 08:29

## 2025-01-01 RX ADMIN — LIDOCAINE HYDROCHLORIDE 10 ML: 10 INJECTION, SOLUTION INFILTRATION; PERINEURAL at 08:33

## 2025-01-01 RX ADMIN — LORAZEPAM 1 MG: 2 LIQUID ORAL at 23:17

## 2025-01-01 RX ADMIN — FINASTERIDE 5 MG: 5 TABLET, FILM COATED ORAL at 11:06

## 2025-01-01 RX ADMIN — PANTOPRAZOLE SODIUM 20 MG: 20 TABLET, DELAYED RELEASE ORAL at 08:29

## 2025-01-01 RX ADMIN — PIPERACILLIN AND TAZOBACTAM 4.5 G: 4; .5 INJECTION, POWDER, LYOPHILIZED, FOR SOLUTION INTRAVENOUS at 12:51

## 2025-01-01 RX ADMIN — GLYCOPYRROLATE 0.4 MG: 0.2 INJECTION, SOLUTION INTRAMUSCULAR; INTRAVITREAL at 16:10

## 2025-01-01 RX ADMIN — CARBOXYMETHYLCELLULOSE SODIUM 1 DROP: 5 SOLUTION/ DROPS OPHTHALMIC at 20:36

## 2025-01-01 RX ADMIN — LATANOPROST 1 DROP: 50 SOLUTION OPHTHALMIC at 22:18

## 2025-01-01 RX ADMIN — CLONAZEPAM 0.5 MG: 0.5 TABLET ORAL at 17:32

## 2025-01-01 RX ADMIN — ASPIRIN 81 MG: 81 TABLET, COATED ORAL at 08:40

## 2025-01-01 RX ADMIN — SIMETHICONE 80 MG: 80 TABLET, CHEWABLE ORAL at 12:15

## 2025-01-01 RX ADMIN — TAMSULOSIN HYDROCHLORIDE 0.4 MG: 0.4 CAPSULE ORAL at 18:50

## 2025-01-01 RX ADMIN — Medication 10 MG: at 22:36

## 2025-01-01 RX ADMIN — Medication 1 DROP: at 20:01

## 2025-01-01 RX ADMIN — ASPIRIN 81 MG: 81 TABLET, COATED ORAL at 18:48

## 2025-01-01 RX ADMIN — LEVOTHYROXINE SODIUM 100 MCG: 0.1 TABLET ORAL at 07:25

## 2025-01-01 RX ADMIN — BENZOCAINE AND MENTHOL 1 LOZENGE: 15; 2.6 LOZENGE ORAL at 23:39

## 2025-01-01 RX ADMIN — MORPHINE SULFATE 4 MG: 2 INJECTION, SOLUTION INTRAMUSCULAR; INTRAVENOUS at 11:01

## 2025-01-01 RX ADMIN — THIAMINE HCL TAB 100 MG 100 MG: 100 TAB at 09:53

## 2025-01-01 RX ADMIN — PANTOPRAZOLE SODIUM 20 MG: 20 TABLET, DELAYED RELEASE ORAL at 08:48

## 2025-01-01 RX ADMIN — MORPHINE SULFATE 2 MG: 2 INJECTION, SOLUTION INTRAMUSCULAR; INTRAVENOUS at 19:46

## 2025-01-01 RX ADMIN — GLYCOPYRROLATE 0.4 MG: 0.2 INJECTION INTRAMUSCULAR; INTRAVENOUS at 13:45

## 2025-01-01 RX ADMIN — Medication 1 DROP: at 18:08

## 2025-01-01 RX ADMIN — FINASTERIDE 5 MG: 5 TABLET, FILM COATED ORAL at 08:40

## 2025-01-01 RX ADMIN — Medication 1 DROP: at 18:39

## 2025-01-01 RX ADMIN — ATROPINE SULFATE 2 DROP: 10 SOLUTION/ DROPS OPHTHALMIC at 17:18

## 2025-01-01 RX ADMIN — TAMSULOSIN HYDROCHLORIDE 0.4 MG: 0.4 CAPSULE ORAL at 08:33

## 2025-01-01 RX ADMIN — Medication 1 DROP: at 12:00

## 2025-01-01 RX ADMIN — CLONAZEPAM 0.5 MG: 0.5 TABLET ORAL at 19:58

## 2025-01-01 RX ADMIN — IPRATROPIUM BROMIDE AND ALBUTEROL SULFATE 3 ML: .5; 3 SOLUTION RESPIRATORY (INHALATION) at 07:40

## 2025-01-01 RX ADMIN — IPRATROPIUM BROMIDE AND ALBUTEROL SULFATE 3 ML: .5; 3 SOLUTION RESPIRATORY (INHALATION) at 18:07

## 2025-01-01 RX ADMIN — ASPIRIN 81 MG: 81 TABLET, COATED ORAL at 08:29

## 2025-01-01 RX ADMIN — CEFUROXIME AXETIL 250 MG: 250 TABLET ORAL at 21:00

## 2025-01-01 RX ADMIN — BUPROPION HYDROCHLORIDE 150 MG: 150 TABLET, EXTENDED RELEASE ORAL at 10:48

## 2025-01-01 RX ADMIN — CARBOXYMETHYLCELLULOSE SODIUM 1 DROP: 5 SOLUTION/ DROPS OPHTHALMIC at 06:28

## 2025-01-01 RX ADMIN — CEFUROXIME AXETIL 250 MG: 250 TABLET ORAL at 08:41

## 2025-01-01 RX ADMIN — BUPROPION HYDROCHLORIDE 150 MG: 150 TABLET, EXTENDED RELEASE ORAL at 08:29

## 2025-01-01 RX ADMIN — FINASTERIDE 5 MG: 5 TABLET, FILM COATED ORAL at 10:11

## 2025-01-01 RX ADMIN — CARBOXYMETHYLCELLULOSE SODIUM 1 DROP: 5 SOLUTION/ DROPS OPHTHALMIC at 09:54

## 2025-01-01 RX ADMIN — TAMSULOSIN HYDROCHLORIDE 0.4 MG: 0.4 CAPSULE ORAL at 10:48

## 2025-01-01 RX ADMIN — MAGNESIUM SULFATE HEPTAHYDRATE 2 G: 40 INJECTION, SOLUTION INTRAVENOUS at 10:43

## 2025-01-01 RX ADMIN — CARBOXYMETHYLCELLULOSE SODIUM 1 DROP: 5 SOLUTION/ DROPS OPHTHALMIC at 10:00

## 2025-01-01 RX ADMIN — FINASTERIDE 5 MG: 5 TABLET, FILM COATED ORAL at 09:54

## 2025-01-01 RX ADMIN — ASPIRIN 81 MG: 81 TABLET, COATED ORAL at 08:48

## 2025-01-01 RX ADMIN — FERROUS SULFATE TAB 325 MG (65 MG ELEMENTAL FE) 325 MG: 325 (65 FE) TAB at 08:34

## 2025-01-01 RX ADMIN — LEVOTHYROXINE SODIUM 100 MCG: 0.1 TABLET ORAL at 09:00

## 2025-01-01 RX ADMIN — PANTOPRAZOLE SODIUM 20 MG: 20 TABLET, DELAYED RELEASE ORAL at 10:49

## 2025-01-01 RX ADMIN — IPRATROPIUM BROMIDE AND ALBUTEROL SULFATE 3 ML: .5; 3 SOLUTION RESPIRATORY (INHALATION) at 23:34

## 2025-01-01 RX ADMIN — ASPIRIN 81 MG: 81 TABLET, COATED ORAL at 11:06

## 2025-01-01 RX ADMIN — ASPIRIN 81 MG: 81 TABLET, COATED ORAL at 16:07

## 2025-01-01 RX ADMIN — CARBOXYMETHYLCELLULOSE SODIUM 1 DROP: 5 SOLUTION/ DROPS OPHTHALMIC at 17:37

## 2025-01-01 RX ADMIN — PANTOPRAZOLE SODIUM 20 MG: 20 TABLET, DELAYED RELEASE ORAL at 08:33

## 2025-01-01 RX ADMIN — GUAIFENESIN 200 MG: 100 LIQUID ORAL at 17:39

## 2025-01-01 RX ADMIN — PANTOPRAZOLE SODIUM 20 MG: 20 TABLET, DELAYED RELEASE ORAL at 08:34

## 2025-01-01 RX ADMIN — BENZOCAINE AND MENTHOL 1 LOZENGE: 15; 2.6 LOZENGE ORAL at 12:45

## 2025-01-01 RX ADMIN — CARBOXYMETHYLCELLULOSE SODIUM 1 DROP: 5 SOLUTION/ DROPS OPHTHALMIC at 18:07

## 2025-01-01 RX ADMIN — FINASTERIDE 5 MG: 5 TABLET, FILM COATED ORAL at 18:47

## 2025-01-01 RX ADMIN — Medication 1 DROP: at 08:35

## 2025-01-01 RX ADMIN — IPRATROPIUM BROMIDE AND ALBUTEROL SULFATE 3 ML: .5; 3 SOLUTION RESPIRATORY (INHALATION) at 05:22

## 2025-01-01 RX ADMIN — LORAZEPAM 1 MG: 2 LIQUID ORAL at 18:15

## 2025-01-01 RX ADMIN — FINASTERIDE 5 MG: 5 TABLET, FILM COATED ORAL at 10:49

## 2025-01-01 RX ADMIN — ASPIRIN 81 MG: 81 TABLET, COATED ORAL at 08:26

## 2025-01-01 RX ADMIN — TRIMETHOBENZAMIDE HYDROCHLORIDE 200 MG: 100 INJECTION INTRAMUSCULAR at 03:29

## 2025-01-01 RX ADMIN — THIAMINE HCL TAB 100 MG 100 MG: 100 TAB at 08:26

## 2025-01-01 RX ADMIN — MORPHINE SULFATE 5 MG: 10 SOLUTION ORAL at 17:46

## 2025-01-01 RX ADMIN — Medication 1 DROP: at 14:47

## 2025-01-01 RX ADMIN — FERROUS SULFATE TAB 325 MG (65 MG ELEMENTAL FE) 325 MG: 325 (65 FE) TAB at 08:35

## 2025-01-01 RX ADMIN — LATANOPROST 1 DROP: 50 SOLUTION OPHTHALMIC at 21:46

## 2025-01-01 RX ADMIN — CEFUROXIME AXETIL 250 MG: 250 TABLET ORAL at 08:33

## 2025-01-01 RX ADMIN — SODIUM CHLORIDE, POTASSIUM CHLORIDE, SODIUM LACTATE AND CALCIUM CHLORIDE 500 ML: 600; 310; 30; 20 INJECTION, SOLUTION INTRAVENOUS at 20:15

## 2025-01-01 RX ADMIN — CARBOXYMETHYLCELLULOSE SODIUM 1 DROP: 5 SOLUTION/ DROPS OPHTHALMIC at 07:40

## 2025-01-01 RX ADMIN — CARBOXYMETHYLCELLULOSE SODIUM 1 DROP: 5 SOLUTION/ DROPS OPHTHALMIC at 05:34

## 2025-01-01 RX ADMIN — Medication 1 DROP: at 10:47

## 2025-01-01 RX ADMIN — Medication 1 DROP: at 12:51

## 2025-01-01 RX ADMIN — GLYCOPYRROLATE 0.4 MG: 0.2 INJECTION INTRAMUSCULAR; INTRAVENOUS at 17:46

## 2025-01-01 RX ADMIN — Medication 500 MCG: at 08:34

## 2025-01-01 RX ADMIN — FERROUS SULFATE TAB 325 MG (65 MG ELEMENTAL FE) 325 MG: 325 (65 FE) TAB at 10:50

## 2025-01-01 RX ADMIN — LATANOPROST 1 DROP: 50 SOLUTION OPHTHALMIC at 21:04

## 2025-01-01 RX ADMIN — PANTOPRAZOLE SODIUM 20 MG: 20 TABLET, DELAYED RELEASE ORAL at 10:09

## 2025-01-01 RX ADMIN — Medication 1 DROP: at 23:16

## 2025-01-01 RX ADMIN — SODIUM CHLORIDE, POTASSIUM CHLORIDE, SODIUM LACTATE AND CALCIUM CHLORIDE 500 ML: 600; 310; 30; 20 INJECTION, SOLUTION INTRAVENOUS at 22:07

## 2025-01-01 RX ADMIN — CARBOXYMETHYLCELLULOSE SODIUM 1 DROP: 5 SOLUTION/ DROPS OPHTHALMIC at 22:40

## 2025-01-01 RX ADMIN — FERROUS SULFATE TAB 325 MG (65 MG ELEMENTAL FE) 325 MG: 325 (65 FE) TAB at 08:33

## 2025-01-01 RX ADMIN — FERROUS SULFATE TAB 325 MG (65 MG ELEMENTAL FE) 325 MG: 325 (65 FE) TAB at 08:40

## 2025-01-01 RX ADMIN — Medication 1 DROP: at 14:32

## 2025-01-01 RX ADMIN — Medication 1 DROP: at 16:54

## 2025-01-01 RX ADMIN — CARBOXYMETHYLCELLULOSE SODIUM 1 DROP: 5 SOLUTION/ DROPS OPHTHALMIC at 10:01

## 2025-01-01 RX ADMIN — Medication 1 DROP: at 10:00

## 2025-01-01 RX ADMIN — CARBOXYMETHYLCELLULOSE SODIUM 1 DROP: 5 SOLUTION/ DROPS OPHTHALMIC at 08:40

## 2025-01-01 RX ADMIN — CARBOXYMETHYLCELLULOSE SODIUM 1 DROP: 5 SOLUTION/ DROPS OPHTHALMIC at 11:48

## 2025-01-01 RX ADMIN — BUPROPION HYDROCHLORIDE 150 MG: 150 TABLET, EXTENDED RELEASE ORAL at 08:33

## 2025-01-01 RX ADMIN — Medication 10 MG: at 21:05

## 2025-01-01 RX ADMIN — LEVOTHYROXINE SODIUM 100 MCG: 0.1 TABLET ORAL at 08:35

## 2025-01-01 RX ADMIN — IPRATROPIUM BROMIDE AND ALBUTEROL SULFATE 3 ML: .5; 3 SOLUTION RESPIRATORY (INHALATION) at 13:34

## 2025-01-01 RX ADMIN — CARBOXYMETHYLCELLULOSE SODIUM 1 DROP: 5 SOLUTION/ DROPS OPHTHALMIC at 21:53

## 2025-01-01 RX ADMIN — LATANOPROST 1 DROP: 50 SOLUTION OPHTHALMIC at 21:03

## 2025-01-01 RX ADMIN — BENZOCAINE AND MENTHOL 1 LOZENGE: 15; 2.6 LOZENGE ORAL at 14:55

## 2025-01-01 RX ADMIN — TAMSULOSIN HYDROCHLORIDE 0.4 MG: 0.4 CAPSULE ORAL at 08:34

## 2025-01-01 RX ADMIN — TAMSULOSIN HYDROCHLORIDE 0.4 MG: 0.4 CAPSULE ORAL at 08:48

## 2025-01-01 RX ADMIN — THIAMINE HCL TAB 100 MG 100 MG: 100 TAB at 11:06

## 2025-01-01 RX ADMIN — BUPROPION HYDROCHLORIDE 150 MG: 150 TABLET, EXTENDED RELEASE ORAL at 08:48

## 2025-01-01 RX ADMIN — MORPHINE SULFATE 5 MG: 10 SOLUTION ORAL at 21:29

## 2025-01-01 RX ADMIN — LEVOTHYROXINE SODIUM 100 MCG: 0.1 TABLET ORAL at 06:17

## 2025-01-01 RX ADMIN — BUPROPION HYDROCHLORIDE 150 MG: 150 TABLET, EXTENDED RELEASE ORAL at 08:40

## 2025-01-01 RX ADMIN — LEVOTHYROXINE SODIUM 100 MCG: 0.1 TABLET ORAL at 11:06

## 2025-01-01 RX ADMIN — MORPHINE SULFATE 4 MG: 2 INJECTION, SOLUTION INTRAMUSCULAR; INTRAVENOUS at 23:34

## 2025-01-01 RX ADMIN — TAMSULOSIN HYDROCHLORIDE 0.4 MG: 0.4 CAPSULE ORAL at 11:06

## 2025-01-01 RX ADMIN — LEVOTHYROXINE SODIUM 100 MCG: 0.1 TABLET ORAL at 05:35

## 2025-01-01 RX ADMIN — GUAIFENESIN 200 MG: 100 LIQUID ORAL at 10:34

## 2025-01-01 RX ADMIN — TORSEMIDE 20 MG: 10 TABLET ORAL at 11:06

## 2025-01-01 RX ADMIN — LEVOTHYROXINE SODIUM 100 MCG: 0.1 TABLET ORAL at 09:30

## 2025-01-01 RX ADMIN — CEFUROXIME AXETIL 250 MG: 250 TABLET ORAL at 10:00

## 2025-01-01 RX ADMIN — CARBOXYMETHYLCELLULOSE SODIUM 1 DROP: 5 SOLUTION/ DROPS OPHTHALMIC at 21:41

## 2025-01-01 RX ADMIN — Medication 500 MCG: at 10:47

## 2025-01-01 RX ADMIN — LEVOTHYROXINE SODIUM 100 MCG: 0.1 TABLET ORAL at 06:28

## 2025-01-01 RX ADMIN — Medication 1 DROP: at 15:42

## 2025-01-01 RX ADMIN — Medication 1 DROP: at 06:30

## 2025-01-01 RX ADMIN — Medication 10 MG: at 21:53

## 2025-01-01 RX ADMIN — BUPROPION HYDROCHLORIDE 150 MG: 150 TABLET, EXTENDED RELEASE ORAL at 10:11

## 2025-01-01 RX ADMIN — Medication 1 DROP: at 17:45

## 2025-01-01 RX ADMIN — MEROPENEM 1000 MG: 1 INJECTION, POWDER, FOR SOLUTION INTRAVENOUS at 15:42

## 2025-01-01 RX ADMIN — IPRATROPIUM BROMIDE AND ALBUTEROL SULFATE 3 ML: .5; 3 SOLUTION RESPIRATORY (INHALATION) at 17:46

## 2025-01-01 RX ADMIN — MEROPENEM 1000 MG: 1 INJECTION, POWDER, FOR SOLUTION INTRAVENOUS at 01:46

## 2025-01-01 RX ADMIN — CARBOXYMETHYLCELLULOSE SODIUM 1 DROP: 5 SOLUTION/ DROPS OPHTHALMIC at 21:03

## 2025-01-01 RX ADMIN — TAMSULOSIN HYDROCHLORIDE 0.4 MG: 0.4 CAPSULE ORAL at 08:40

## 2025-01-01 RX ADMIN — Medication 1 DROP: at 14:15

## 2025-01-01 RX ADMIN — CYANOCOBALAMIN TAB 1000 MCG 500 MCG: 1000 TAB at 10:02

## 2025-01-01 RX ADMIN — BENZOCAINE AND MENTHOL 1 LOZENGE: 15; 2.6 LOZENGE ORAL at 20:44

## 2025-01-01 RX ADMIN — GLYCOPYRROLATE 0.4 MG: 0.2 INJECTION INTRAMUSCULAR; INTRAVENOUS at 01:40

## 2025-01-01 RX ADMIN — Medication 1 DROP: at 20:17

## 2025-01-01 RX ADMIN — CARBOXYMETHYLCELLULOSE SODIUM 1 DROP: 5 SOLUTION/ DROPS OPHTHALMIC at 16:20

## 2025-01-01 RX ADMIN — FERROUS SULFATE TAB 325 MG (65 MG ELEMENTAL FE) 325 MG: 325 (65 FE) TAB at 09:59

## 2025-01-01 RX ADMIN — THIAMINE HCL TAB 100 MG 100 MG: 100 TAB at 08:41

## 2025-01-01 RX ADMIN — CEFUROXIME AXETIL 250 MG: 250 TABLET ORAL at 20:22

## 2025-01-01 RX ADMIN — BUPROPION HYDROCHLORIDE 150 MG: 150 TABLET, EXTENDED RELEASE ORAL at 11:06

## 2025-01-01 RX ADMIN — Medication 10 MG: at 21:15

## 2025-01-01 RX ADMIN — ESCITALOPRAM OXALATE 5 MG: 5 TABLET, FILM COATED ORAL at 11:55

## 2025-01-01 RX ADMIN — CARBOXYMETHYLCELLULOSE SODIUM 1 DROP: 5 SOLUTION/ DROPS OPHTHALMIC at 10:34

## 2025-01-01 RX ADMIN — PIPERACILLIN AND TAZOBACTAM 4.5 G: 4; .5 INJECTION, POWDER, LYOPHILIZED, FOR SOLUTION INTRAVENOUS at 21:14

## 2025-01-01 RX ADMIN — CARBOXYMETHYLCELLULOSE SODIUM 1 DROP: 5 SOLUTION/ DROPS OPHTHALMIC at 19:56

## 2025-01-01 RX ADMIN — Medication 1 DROP: at 09:00

## 2025-01-01 RX ADMIN — Medication 10 MG: at 21:42

## 2025-01-01 RX ADMIN — GLYCOPYRROLATE 0.4 MG: 0.2 INJECTION INTRAMUSCULAR; INTRAVENOUS at 17:18

## 2025-01-01 RX ADMIN — LATANOPROST 1 DROP: 50 SOLUTION OPHTHALMIC at 20:17

## 2025-01-01 RX ADMIN — PIPERACILLIN AND TAZOBACTAM 4.5 G: 4; .5 INJECTION, POWDER, LYOPHILIZED, FOR SOLUTION INTRAVENOUS; PARENTERAL at 19:43

## 2025-01-01 RX ADMIN — CARBOXYMETHYLCELLULOSE SODIUM 1 DROP: 5 SOLUTION/ DROPS OPHTHALMIC at 16:43

## 2025-01-01 RX ADMIN — THIAMINE HCL TAB 100 MG 100 MG: 100 TAB at 08:35

## 2025-01-01 RX ADMIN — Medication 1 DROP: at 14:00

## 2025-01-01 RX ADMIN — BENZOCAINE AND MENTHOL 1 LOZENGE: 15; 2.6 LOZENGE ORAL at 04:15

## 2025-01-01 RX ADMIN — PANTOPRAZOLE SODIUM 20 MG: 20 TABLET, DELAYED RELEASE ORAL at 18:47

## 2025-01-01 RX ADMIN — TAMSULOSIN HYDROCHLORIDE 0.4 MG: 0.4 CAPSULE ORAL at 09:54

## 2025-01-01 RX ADMIN — CARBOXYMETHYLCELLULOSE SODIUM 1 DROP: 5 SOLUTION/ DROPS OPHTHALMIC at 22:18

## 2025-01-01 RX ADMIN — BUPROPION HYDROCHLORIDE 150 MG: 150 TABLET, EXTENDED RELEASE ORAL at 08:34

## 2025-01-01 RX ADMIN — PANTOPRAZOLE SODIUM 20 MG: 20 TABLET, DELAYED RELEASE ORAL at 09:54

## 2025-01-01 RX ADMIN — LATANOPROST 1 DROP: 50 SOLUTION OPHTHALMIC at 23:30

## 2025-01-01 RX ADMIN — Medication 1 DROP: at 16:33

## 2025-01-01 RX ADMIN — FERROUS SULFATE TAB 325 MG (65 MG ELEMENTAL FE) 325 MG: 325 (65 FE) TAB at 07:40

## 2025-01-01 RX ADMIN — GLYCOPYRROLATE 0.4 MG: 0.2 INJECTION INTRAMUSCULAR; INTRAVENOUS at 05:22

## 2025-01-01 RX ADMIN — CARBOXYMETHYLCELLULOSE SODIUM 1 DROP: 5 SOLUTION/ DROPS OPHTHALMIC at 18:37

## 2025-01-01 RX ADMIN — FINASTERIDE 5 MG: 5 TABLET, FILM COATED ORAL at 08:48

## 2025-01-01 RX ADMIN — Medication 500 MCG: at 11:06

## 2025-01-01 RX ADMIN — GLYCOPYRROLATE 0.4 MG: 0.2 INJECTION INTRAMUSCULAR; INTRAVENOUS at 09:18

## 2025-01-01 RX ADMIN — MEROPENEM 1000 MG: 1 INJECTION, POWDER, FOR SOLUTION INTRAVENOUS at 14:15

## 2025-01-01 RX ADMIN — Medication 1 DROP: at 08:39

## 2025-01-01 RX ADMIN — CARBOXYMETHYLCELLULOSE SODIUM 1 DROP: 5 SOLUTION/ DROPS OPHTHALMIC at 10:33

## 2025-01-01 RX ADMIN — ASPIRIN 81 MG: 81 TABLET, COATED ORAL at 08:33

## 2025-01-01 RX ADMIN — ASPIRIN 81 MG: 81 TABLET, COATED ORAL at 09:54

## 2025-01-01 RX ADMIN — THIAMINE HCL TAB 100 MG 100 MG: 100 TAB at 08:34

## 2025-01-01 RX ADMIN — CLONAZEPAM 0.5 MG: 0.5 TABLET ORAL at 22:37

## 2025-01-01 RX ADMIN — THIAMINE HCL TAB 100 MG 100 MG: 100 TAB at 08:48

## 2025-01-01 RX ADMIN — Medication 10 MG: at 20:44

## 2025-01-01 RX ADMIN — FINASTERIDE 5 MG: 5 TABLET, FILM COATED ORAL at 08:29

## 2025-01-01 RX ADMIN — ANTACID TABLETS 200 MG OF ELEMENTAL CALCIUM: 500 TABLET, CHEWABLE ORAL at 00:15

## 2025-01-01 RX ADMIN — LEVOTHYROXINE SODIUM 100 MCG: 0.1 TABLET ORAL at 06:39

## 2025-01-01 RX ADMIN — TAMSULOSIN HYDROCHLORIDE 0.4 MG: 0.4 CAPSULE ORAL at 09:59

## 2025-01-01 RX ADMIN — METOPROLOL SUCCINATE 12.5 MG: 25 TABLET, EXTENDED RELEASE ORAL at 08:34

## 2025-01-01 RX ADMIN — Medication 500 MCG: at 08:29

## 2025-01-01 RX ADMIN — CARBOXYMETHYLCELLULOSE SODIUM 1 DROP: 5 SOLUTION/ DROPS OPHTHALMIC at 16:10

## 2025-01-01 RX ADMIN — GLYCOPYRROLATE 0.4 MG: 0.2 INJECTION INTRAMUSCULAR; INTRAVENOUS at 13:44

## 2025-01-01 RX ADMIN — CARBOXYMETHYLCELLULOSE SODIUM 1 DROP: 5 SOLUTION/ DROPS OPHTHALMIC at 18:15

## 2025-01-01 RX ADMIN — FERROUS SULFATE TAB 325 MG (65 MG ELEMENTAL FE) 325 MG: 325 (65 FE) TAB at 11:06

## 2025-01-01 RX ADMIN — LATANOPROST 1 DROP: 50 SOLUTION OPHTHALMIC at 20:01

## 2025-01-01 RX ADMIN — FINASTERIDE 5 MG: 5 TABLET, FILM COATED ORAL at 08:34

## 2025-01-01 RX ADMIN — CARBOXYMETHYLCELLULOSE SODIUM 1 DROP: 5 SOLUTION/ DROPS OPHTHALMIC at 20:00

## 2025-01-01 RX ADMIN — MORPHINE SULFATE 5 MG: 10 SOLUTION ORAL at 13:46

## 2025-01-01 RX ADMIN — Medication 1 DROP: at 10:51

## 2025-01-01 RX ADMIN — Medication 10 MG: at 20:17

## 2025-01-01 RX ADMIN — CARBOXYMETHYLCELLULOSE SODIUM 1 DROP: 5 SOLUTION/ DROPS OPHTHALMIC at 06:39

## 2025-01-01 RX ADMIN — TAMSULOSIN HYDROCHLORIDE 0.4 MG: 0.4 CAPSULE ORAL at 11:55

## 2025-01-01 RX ADMIN — PANTOPRAZOLE SODIUM 40 MG: 40 INJECTION, POWDER, FOR SOLUTION INTRAVENOUS at 03:23

## 2025-01-01 RX ADMIN — PIPERACILLIN AND TAZOBACTAM 4.5 G: 4; .5 INJECTION, POWDER, LYOPHILIZED, FOR SOLUTION INTRAVENOUS at 01:44

## 2025-01-01 RX ADMIN — THIAMINE HCL TAB 100 MG 100 MG: 100 TAB at 10:47

## 2025-01-01 RX ADMIN — THIAMINE HCL TAB 100 MG 100 MG: 100 TAB at 08:33

## 2025-01-01 RX ADMIN — Medication 1 DROP: at 11:09

## 2025-01-01 RX ADMIN — Medication 1 DROP: at 12:15

## 2025-01-01 RX ADMIN — CARBOXYMETHYLCELLULOSE SODIUM 1 DROP: 5 SOLUTION/ DROPS OPHTHALMIC at 11:12

## 2025-01-01 RX ADMIN — PIPERACILLIN AND TAZOBACTAM 4.5 G: 4; .5 INJECTION, POWDER, LYOPHILIZED, FOR SOLUTION INTRAVENOUS at 06:30

## 2025-01-01 RX ADMIN — BENZONATATE 200 MG: 100 CAPSULE ORAL at 16:05

## 2025-01-01 RX ADMIN — THIAMINE HCL TAB 100 MG 100 MG: 100 TAB at 18:50

## 2025-01-01 RX ADMIN — CARBOXYMETHYLCELLULOSE SODIUM 1 DROP: 5 SOLUTION/ DROPS OPHTHALMIC at 16:00

## 2025-01-01 RX ADMIN — BUPROPION HYDROCHLORIDE 150 MG: 150 TABLET, EXTENDED RELEASE ORAL at 09:53

## 2025-01-01 RX ADMIN — SCOPOLAMINE 1 PATCH: 1.5 PATCH, EXTENDED RELEASE TRANSDERMAL at 20:08

## 2025-01-01 RX ADMIN — TAMSULOSIN HYDROCHLORIDE 0.4 MG: 0.4 CAPSULE ORAL at 08:29

## 2025-01-01 RX ADMIN — BENZOCAINE AND MENTHOL 1 LOZENGE: 15; 2.6 LOZENGE ORAL at 05:34

## 2025-01-01 RX ADMIN — TORSEMIDE 20 MG: 10 TABLET ORAL at 21:04

## 2025-01-01 RX ADMIN — LATANOPROST 1 DROP: 50 SOLUTION OPHTHALMIC at 04:55

## 2025-01-01 RX ADMIN — ASPIRIN 81 MG: 81 TABLET, COATED ORAL at 09:58

## 2025-01-01 RX ADMIN — PANTOPRAZOLE SODIUM 20 MG: 20 TABLET, DELAYED RELEASE ORAL at 11:06

## 2025-01-01 RX ADMIN — MEROPENEM 1000 MG: 1 INJECTION, POWDER, FOR SOLUTION INTRAVENOUS at 15:09

## 2025-01-01 RX ADMIN — Medication 1 DROP: at 21:04

## 2025-01-01 RX ADMIN — METHENAMINE HIPPURATE 1 G: 1 TABLET ORAL at 11:55

## 2025-01-01 RX ADMIN — LATANOPROST 1 DROP: 50 SOLUTION OPHTHALMIC at 00:15

## 2025-01-01 RX ADMIN — PANTOPRAZOLE SODIUM 20 MG: 20 TABLET, DELAYED RELEASE ORAL at 08:40

## 2025-01-01 RX ADMIN — CYANOCOBALAMIN TAB 1000 MCG 500 MCG: 1000 TAB at 08:33

## 2025-01-01 RX ADMIN — PIPERACILLIN AND TAZOBACTAM 4.5 G: 4; .5 INJECTION, POWDER, LYOPHILIZED, FOR SOLUTION INTRAVENOUS at 11:05

## 2025-01-01 RX ADMIN — FINASTERIDE 5 MG: 5 TABLET, FILM COATED ORAL at 08:26

## 2025-01-01 RX ADMIN — MEROPENEM 1000 MG: 1 INJECTION, POWDER, FOR SOLUTION INTRAVENOUS at 02:30

## 2025-01-01 RX ADMIN — CARBOXYMETHYLCELLULOSE SODIUM 1 DROP: 5 SOLUTION/ DROPS OPHTHALMIC at 08:49

## 2025-01-01 RX ADMIN — MEROPENEM 1000 MG: 1 INJECTION, POWDER, FOR SOLUTION INTRAVENOUS at 01:33

## 2025-01-01 RX ADMIN — Medication 1 DROP: at 13:01

## 2025-01-01 RX ADMIN — PANTOPRAZOLE SODIUM 40 MG: 40 TABLET, DELAYED RELEASE ORAL at 08:27

## 2025-01-01 RX ADMIN — GLYCOPYRROLATE 0.1 MG: 0.2 INJECTION INTRAMUSCULAR; INTRAVENOUS at 23:16

## 2025-01-01 RX ADMIN — LEVOTHYROXINE SODIUM 100 MCG: 0.1 TABLET ORAL at 10:49

## 2025-01-01 RX ADMIN — CARBOXYMETHYLCELLULOSE SODIUM 1 DROP: 5 SOLUTION/ DROPS OPHTHALMIC at 05:22

## 2025-01-01 RX ADMIN — IPRATROPIUM BROMIDE AND ALBUTEROL SULFATE 3 ML: .5; 3 SOLUTION RESPIRATORY (INHALATION) at 23:25

## 2025-01-01 RX ADMIN — Medication 1 DROP: at 16:07

## 2025-01-01 RX ADMIN — SODIUM CHLORIDE 500 ML: 9 INJECTION, SOLUTION INTRAVENOUS at 13:25

## 2025-01-01 RX ADMIN — Medication 1 DROP: at 04:54

## 2025-01-01 RX ADMIN — LEVOTHYROXINE SODIUM 100 MCG: 0.1 TABLET ORAL at 06:53

## 2025-01-01 RX ADMIN — THIAMINE HCL TAB 100 MG 100 MG: 100 TAB at 10:11

## 2025-01-01 RX ADMIN — GLYCOPYRROLATE 0.4 MG: 0.2 INJECTION INTRAMUSCULAR; INTRAVENOUS at 21:01

## 2025-01-01 RX ADMIN — Medication 10 MG: at 20:01

## 2025-01-01 RX ADMIN — CEFUROXIME AXETIL 250 MG: 250 TABLET ORAL at 22:40

## 2025-01-01 ASSESSMENT — COGNITIVE AND FUNCTIONAL STATUS - GENERAL
WALKING IN HOSPITAL ROOM: 2 - A LOT
MOVING TO AND FROM BED TO CHAIR: 1 - TOTAL
CLIMB 3 TO 5 STEPS WITH RAILING: 1 - TOTAL
WALKING IN HOSPITAL ROOM: 2 - A LOT
WALKING IN HOSPITAL ROOM: 2 - A LOT
MOVING TO AND FROM BED TO CHAIR: 2 - A LOT
CLIMB 3 TO 5 STEPS WITH RAILING: 1 - TOTAL
HELP NEEDED FOR PERSONAL GROOMING: 3 - A LITTLE
WALKING IN HOSPITAL ROOM: 1 - TOTAL
CLIMB 3 TO 5 STEPS WITH RAILING: 2 - A LOT
CLIMB 3 TO 5 STEPS WITH RAILING: 1 - TOTAL
STANDING UP FROM CHAIR USING ARMS: 2 - A LOT
STANDING UP FROM CHAIR USING ARMS: 1 - TOTAL
WALKING IN HOSPITAL ROOM: 1 - TOTAL
STANDING UP FROM CHAIR USING ARMS: 2 - A LOT
MOVING TO AND FROM BED TO CHAIR: 1 - TOTAL
EATING MEALS: 4 - NONE
STANDING UP FROM CHAIR USING ARMS: 1 - TOTAL
STANDING UP FROM CHAIR USING ARMS: 2 - A LOT
MOVING TO AND FROM BED TO CHAIR: 1 - TOTAL
MOVING TO AND FROM BED TO CHAIR: 3 - A LITTLE
CLIMB 3 TO 5 STEPS WITH RAILING: 2 - A LOT
STANDING UP FROM CHAIR USING ARMS: 2 - A LOT
WALKING IN HOSPITAL ROOM: 2 - A LOT
CLIMB 3 TO 5 STEPS WITH RAILING: 2 - A LOT
CLIMB 3 TO 5 STEPS WITH RAILING: 1 - TOTAL
CLIMB 3 TO 5 STEPS WITH RAILING: 2 - A LOT
CLIMB 3 TO 5 STEPS WITH RAILING: 1 - TOTAL
CLIMB 3 TO 5 STEPS WITH RAILING: 1 - TOTAL
WALKING IN HOSPITAL ROOM: 2 - A LOT
CLIMB 3 TO 5 STEPS WITH RAILING: 1 - TOTAL
STANDING UP FROM CHAIR USING ARMS: 3 - A LITTLE
WALKING IN HOSPITAL ROOM: 1 - TOTAL
STANDING UP FROM CHAIR USING ARMS: 2 - A LOT
MOVING TO AND FROM BED TO CHAIR: 2 - A LOT
WALKING IN HOSPITAL ROOM: 2 - A LOT
STANDING UP FROM CHAIR USING ARMS: 2 - A LOT
MOVING TO AND FROM BED TO CHAIR: 2 - A LOT
STANDING UP FROM CHAIR USING ARMS: 1 - TOTAL
MOVING TO AND FROM BED TO CHAIR: 2 - A LOT
MOVING TO AND FROM BED TO CHAIR: 3 - A LITTLE
MOVING TO AND FROM BED TO CHAIR: 2 - A LOT
STANDING UP FROM CHAIR USING ARMS: 2 - A LOT
MOVING TO AND FROM BED TO CHAIR: 2 - A LOT
AFFECT: FLAT/BLUNTED AFFECT
MOVING TO AND FROM BED TO CHAIR: 2 - A LOT
MOVING TO AND FROM BED TO CHAIR: 2 - A LOT
DRESSING REGULAR LOWER BODY CLOTHING: 1 - TOTAL
MOVING TO AND FROM BED TO CHAIR: 1 - TOTAL
WALKING IN HOSPITAL ROOM: 1 - TOTAL
WALKING IN HOSPITAL ROOM: 1 - TOTAL
STANDING UP FROM CHAIR USING ARMS: 1 - TOTAL
WALKING IN HOSPITAL ROOM: 2 - A LOT
HELP NEEDED FOR BATHING: 2 - A LOT
CLIMB 3 TO 5 STEPS WITH RAILING: 4 - NONE
MOVING TO AND FROM BED TO CHAIR: 2 - A LOT
DRESSING REGULAR UPPER BODY CLOTHING: 3 - A LITTLE
STANDING UP FROM CHAIR USING ARMS: 1 - TOTAL
WALKING IN HOSPITAL ROOM: 4 - NONE
STANDING UP FROM CHAIR USING ARMS: 1 - TOTAL
CLIMB 3 TO 5 STEPS WITH RAILING: 1 - TOTAL
WALKING IN HOSPITAL ROOM: 1 - TOTAL
STANDING UP FROM CHAIR USING ARMS: 4 - NONE
MOVING TO AND FROM BED TO CHAIR: 1 - TOTAL
CLIMB 3 TO 5 STEPS WITH RAILING: 1 - TOTAL
MOVING TO AND FROM BED TO CHAIR: 4 - NONE
TOILETING: 1 - TOTAL
AFFECT: FLAT/BLUNTED AFFECT
STANDING UP FROM CHAIR USING ARMS: 1 - TOTAL
WALKING IN HOSPITAL ROOM: 2 - A LOT
CLIMB 3 TO 5 STEPS WITH RAILING: 1 - TOTAL
CLIMB 3 TO 5 STEPS WITH RAILING: 1 - TOTAL
STANDING UP FROM CHAIR USING ARMS: 2 - A LOT
WALKING IN HOSPITAL ROOM: 1 - TOTAL
CLIMB 3 TO 5 STEPS WITH RAILING: 2 - A LOT
WALKING IN HOSPITAL ROOM: 1 - TOTAL

## 2025-01-01 NOTE — PLAN OF CARE
Care Coordination Discharge Plan Note     Discharge Needs Assessment  Concerns to be Addressed:    Current Discharge Risk:      Anticipated Discharge Plan  Anticipated Discharge Disposition:         Patient Choice  Offered/Gave Vendor List:    Patient's Choice of Community Agency(s):         Interdisciplinary Discharge Plan Review:  Participants:     Concerns Comments: Per updates from DCP rounds, pt is stable for DC home today. Spoke with sonOlya and confirmed that pt's private HHA is able to resume today. Jeffery Zhu will call HHA to notify. [facility] [place] central intake notified of DC planned and to follow for MATTHEW. One of pt's sons will provide transport home. IMM completed. Son agreeable with DC plans.  SW will continue to offer support and follow for needs until DC complete.    Discharge Plan:   Disposition/Destination:   /  n/a  Discharge Facility:  n/a  Community Resources:  n/a    Discharge Transportation:  Is Out of Hospital DNR needed at Discharge:  yes  Does patient need discharge transport?  N/a

## 2025-01-01 NOTE — DISCHARGE INSTRUCTIONS
Dear Mr. Elena,    You came to the Emergency Department because you were having blood in your ostomy bag. You were admitted to the hospital for close monitoring a gastrointestinal bleed. You have had hospital admissions multiple times in the past due to similar bleeding. The source of your bleeding is likely related to known diverticula, or outpouchings in your colon which can bleed. You received a blood transfusion and your blood counts improved. The Gastroenterology and Surgery teams saw you. Given that your bleeding stopped and considering the risks of a procedure with your medical history, it was recommended not to pursue an endoscopy at this time. Your vital signs remained stable without further signs of bleeding in your ostomy, therefore you are stable for discharge home.    You can continue all of your medications as previously prescribed starting tomorrow morning. You are safe to take your aspirin medication.    Please bring this discharge paperwork to all of your follow up appointments. If you experience recurrence of your symptoms, please do not hesitate to call your primary care doctor or present to the emergency department for evaluation. It was a pleasure taking care of you at [facility]! We wish you the best of health.     TO DO:  [ ] Please follow up with your PCP within 1 week. Repeat a CBC blood test at that time.  [ ] Please follow up with the Colorectal Surgery Dr. Bryant on February 20, 2025 at 1:30 PM.  [ ] Please follow up with Cardiology Dr. Jeffrey on January 14, 2025 at 3:00 PM.    [ ] Continue all of your medications as prescribed and listed below.    [ ] If you notice any recurrent signs of bleeding you should call your doctor right away, if the bleeding is persistent or you develop any symptoms such as abdominal pain, persistent vomiting, passage of large amount of blood then you should call 911 and come to the Emergency Department.

## 2025-01-01 NOTE — PROGRESS NOTES
Gastroenterology  Daily Progress Note       SUBJECTIVE   This is a 89 y.o. year-old male admitted on 12/30/2024 with Gastrointestinal hemorrhage, unspecified gastrointestinal hemorrhage type [K92.2]  At risk for prolonged QT interval syndrome [Z91.89].    Patient seen and examined at bedside.  No acute overnight events.  He continues to feel well.  No nausea, vomiting, abdominal pain.  Light brown liquid stool in ostomy bag this morning.  No rectal bleeding.     OBJECTIVE      Vital signs in last 24 hours:  Temp:  [36.4 °C (97.5 °F)-36.8 °C (98.2 °F)] 36.6 °C (97.9 °F)  Heart Rate:  [61-84] 75  Resp:  [16-18] 18  BP: (103-125)/(60-76) 110/63    Intake/Output Summary (Last 24 hours) at 1/1/2025 0644  Last data filed at 1/1/2025 0515  Gross per 24 hour   Intake 345 ml   Output 900 ml   Net -555 ml       PHYSICAL EXAMINATION      Physical Exam  Constitutional:       General: He is not in acute distress.  Cardiovascular:      Rate and Rhythm: Normal rate.   Abdominal:      Comments: Soft, nontender, light brown liquid stool in ostomy bag   Skin:     General: Skin is warm and dry.   Neurological:      Mental Status: He is alert and oriented to person, place, and time.          LABS / IMAGING / TELE      Labs  I have reviewed the patient's labs to the time of note. No new clinical concern.  Results from last 7 days   Lab Units 12/31/24  0534 12/31/24  0426 12/30/24  2030   SODIUM mEQ/L 138 139 138   POTASSIUM mEQ/L 4.0 4.3 4.6   CHLORIDE mEQ/L 109* 109* 108*   CO2 mEQ/L 25 24 22   BUN mg/dL 26* 28* 29*   CREATININE mg/dL 1.5* 1.5* 1.6*   CALCIUM mg/dL 8.1* 8.1* 8.4*   ALBUMIN g/dL 2.8* 2.8* 3.0*   BILIRUBIN TOTAL mg/dL 0.9 0.9 0.7   ALK PHOS IU/L 76 74 85   ALT IU/L 5* 4* 5*   AST IU/L 25 24 33   GLUCOSE mg/dL 104* 111* 202*         Results from last 7 days   Lab Units 12/31/24  1718 12/31/24  1133 12/31/24  0534 12/31/24  0426 12/30/24  2322 12/30/24  2030   WBC K/uL 4.58 4.16 4.91 5.10 5.58 4.89   HEMOGLOBIN g/dL  8.7* 7.8* 7.2* 7.2* 7.9* 8.4*   HEMATOCRIT % 27.2* 24.5* 23.0* 23.1* 25.6* 27.1*   PLATELETS K/uL 95* 83* 96* 95* 87* 99*   DIFF TYPE   --   --   --  Auto Auto Auto   NRBC %  --   --   --  0.0 0.0 0.0   IMM GRANULOCYTES %  --   --   --  0.2 0.2 0.2   NEUTROPHILS %  --   --   --  75.2 78.5 80.7   LYMPHOCYTES %  --   --   --  10.2 8.1 6.3   MONOCYTES %  --   --   --  12.2 11.6 11.2   EOSINOPHILS %  --   --   --  1.4 1.1 1.0   BASOPHILS %  --   --   --  0.8 0.5 0.6   IMM GRANUCOCYTES ABS K/uL  --   --   --  0.01 0.01 0.01   MONO ABS AUTO K/uL  --   --   --  0.62 0.65 0.55   EOS ABS AUTO K/uL  --   --   --  0.07 0.06 0.05   BASO ABS AUTO K/uL  --   --   --  0.04 0.03 0.03         SARS-CoV-2 (COVID-19) (no units)   Date/Time Value   09/23/2024 2212 Negative          Imaging  I have independently reviewed the pertinent imaging from the last 24 hrs.    ASSESSMENT AND PLAN        #Bleeding from stoma  89-year-old male with significant cardiac history and recent GI bleeding both from stoma and rectum who presents with recurrent bleeding from stoma.  CTA without active bleeding.  Hemoglobin slightly below baseline.  Undergone multiple recent procedures for evaluation of this. Flex sig on 10/18 via stoma with intact stoma, diverticulosis, no active bleeding.  Flex sig on 10/23 via rectum with blood clots throughout rectum and sigmoid with many diverticula.  11/27 EGD unremarkable.  11/27 flex sig via stoma with moderate amount of brown stool, no fresh or residual blood, diverticulosis.  Hemoglobin slightly below baseline.  Has brown stool without any blood in his ostomy bag at time of consult. Remains brown today. Hgb improved and stabilized. Suspect bleeding either diverticular >> stomal skin irritation.      - No indication for endoscopic evaluation at this time. Bleeding has again self-resolved and endoscopy which is very high risk with his torrential TR unlikely to offer much benefit  - Regular diet  - Transfuse to greater  than 7 or greater than 8 of cardiac indication  - Maintain 2 large-bore IVs, active type and screen  - Okay for aspirin  - Maintain 2 large-bore IVs, active type and screen, consent  - GI will sign off, please call for any questions or concerns        Please see attending attestation for final recommendations    Susanna Brown DO  Gastroenterology Fellow, PGY-4  Pager [phone]       VTE Assessment: Padua    Code Status: DNR (A.N.D.)  Estimated discharge date: 1/1/2025

## 2025-01-01 NOTE — PLAN OF CARE
Plan of Care Review  Plan of Care Reviewed With: patient  Progress: improving  Outcome Evaluation: AAOx3 to 4, forgetful to time, redirects easily. Denies c/o pain or SOB. Continent void using urinal. Colostomy with brown liquid stool output. No active bleeding noted. Fall and Isolation precautions maintained.

## 2025-01-01 NOTE — DISCHARGE SUMMARY
Hospital Medicine    Inpatient Discharge Summary        BRIEF OVERVIEW     Patient: Samy Elena  Admission Date: 2024   : 10/6/1930 Discharge Date: 2025       PCP: Zachery Hernandez MD  Disposition:      Destination:       Attending Provider: Melquiades Villarreal MD Attending phys phone: [phone]    Code Status At Discharge: DNR (A.N.D.)    Samy Elena was seen by the palliative care team during this hospitalization.  Please see palliative care team documentation for further details.  Also reference the advance care planning (ACP) website (Advance Care Planning (Advance Directive)  Patient services  [facility] ) to learn more about completing an advance directive, including Health Care Power of  and Living Will documents.        SUMMARY OF HOSPITAL COURSE   Brief Hospital Course  This is a 89 y.o. year-old male admitted on 2024 with Gastrointestinal hemorrhage, unspecified gastrointestinal hemorrhage type.    This is a 89 y.o. 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  He also noted some irritation on the medial aspect of stoma which sometimes oozes when he cleans it.  Patient was asymptomatic, no recent illnesses.      He has had previous recent episodes of GI bleeding both from stoma and rectum. Has undergone multiple recent procedures for evaluation of this. Flex sig on 10/18 via stoma with intact stoma, diverticulosis, no active bleeding.  Flex sig on 10/23 via rectum with blood clots throughout rectum and sigmoid with many diverticula.   EGD unremarkable.   flex sig via stoma with moderate amount of brown stool, no fresh or residual blood, diverticulosis.     In the ED, patient  was afebrile and hemodynamically stable, BP as low as 91/51 and no tachycardia.  Renal function at baseline (Cr 1.6 from baseline 1.4).  Lactate 2.9, trended to clear.  Hemoglobin 8.4 (baseline 8-9s), macrocytic, trended down to 7.2.  Anemia workup included iron 51, ferritin 38, iron sat 16, folate 19.5, B12 800.  INR 1.7.  Thrombocytopenia slightly worse than prior, 80s-90s.  CXR with large left-sided effusion.  Patient underwent CTA which showed no active evidence of bleeding.     #Bleeding from stoma  #Lower GI bleed  # Acute on chronic anemia  89-year-old male with significant cardiac history and recent GI bleeding both from stoma and rectum who presented with recurrent bleeding from stoma.  CTA without active bleeding.  Hemoglobin slightly below baseline, downtrended to 7.2 at lowest. Had brown stool without any blood in his ostomy bag at time of admission. Remained brown throughout admission. Hgb improved and stabilized to mid 8s after 1u PRBC. Diet advanced from CLD to regular diet.  Aspirin was continued during admission. Suspect bleeding either diverticular >> stomal skin irritation. Both general surgery and gastroenterology consulted during admission without recommendation for further intervention.  Palliative care also consulted during admission given recurrent hospitalizations.  Currently at this time patient prefers to return to the hospital if needed, otherwise will remain DNR/DNI.  -Outpatient follow-up with colorectal surgery, Dr. Bryant  -Continue famotidine and omeprazole    #HFrEF  #Torrential tricuspid regurgitation  #Pleural effusions  Patient has history of HFrEF EF 40 to 45% with dilated RV cavity, torrential tricuspid regurgitation, dilated RA.  CT was notable for large left and moderate right pleural effusions that had increased in size compared to 11/26/2024.  Patient had no evidence of hypoxia during the admission, vital signs stable, appeared euvolemic on exam.  Torsemide held during  admission in the setting of acute blood loss anemia.  -Routine outpatient follow-up with pulmonology and cardiology  -Continue metoprolol succinate  -Resume torsemide on 1/2/2025    #Atrial fibrillation  #CAD  Patient has coronary artery disease s/p CABG and atrial fibrillation, not on AC due to recurrent GI bleeds.  Patient was continued on metoprolol succinate and aspirin during admission.  -Continue metoprolol succinate and aspirin    #Hypothyroidism  -Continue Synthroid    #Depression  Lexapro initially held in the setting of prolonged QTc.  QTc was stable and so Lexapro was continued prior to discharge.  -Continue Lexapro    #Iliac artery aneurysm  Seen to have stable iliac artery aneurysm on CT which has been previously evaluated by vascular surgery.  -Outpatient follow-up with PCP    #BPH  Seen to have enlarged prostate gland with chronic bladder outlet obstruction on CT.  Bladder scan PVR obtained while admitted showed no evidence of retention.  -Continue finasteride and tamsulosin  -Outpatient follow-up with PCP    ASSESSMENT AND PLAN     As noted above    Exam on Day of Discharge  Temp:  [36.4 °C (97.5 °F)-36.8 °C (98.2 °F)] 36.5 °C (97.7 °F)  Heart Rate:  [61-84] 81  Resp:  [16-18] 16  BP: (107-125)/(60-76) 117/70  Physical Exam  Cardiovascular:      Rate and Rhythm: Normal rate and regular rhythm.   Pulmonary:      Effort: Pulmonary effort is normal. No respiratory distress.      Breath sounds: Normal breath sounds.   Abdominal:      General: There is no distension.      Palpations: Abdomen is soft.      Tenderness: There is no abdominal tenderness.      Comments: Ostomy bag with brown stool, no blood noted   Musculoskeletal:      Right lower leg: No edema.      Left lower leg: No edema.   Neurological:      Mental Status: He is alert and oriented to person, place, and time.   Psychiatric:         Mood and Affect: Mood normal.         Behavior: Behavior normal.         DISCHARGE MEDICATIONS          Medication List        CONTINUE taking these medications      aspirin 81 mg enteric coated tablet  Take 81 mg by mouth daily.  Dose: 81 mg     calcium (as carbonate) 200 mg calcium (500 mg) chewable tablet  Commonly known as: TUMS  Take 1 tablet by mouth daily.  Dose: 1 tablet     carboxymethylcellulose 0.5 % dropperette  Commonly known as: REFRESH PLUS  Administer 1 drop into both eyes every2 (two) hours while awake.  Dose: 1 drop     cranberry extract 425 mg capsule  Take 425 mg by mouth daily.  Dose: 425 mg     cyanocobalamin 500 mcg tablet  Commonly known as: VITAMIN B12  Take 500 mcg by mouth every morning.  Dose: 500 mcg     escitalopram 10 mg tablet  Commonly known as: LEXAPRO  Take 10 mg by mouth every morning.  Dose: 10 mg     famotidine 10 mg tablet  Commonly known as: PEPCID  Take 1 tablet (10 mg total) by mouth daily Indications: gastroesophageal reflux disease.  Dose: 10 mg     ferrous sulfate 325 mg (65 mg iron) tablet  Take 65 mg by mouth daily with breakfast.  Dose: 65 mg     finasteride 5 mg tablet  Commonly known as: PROSCAR  Take 1 tablet (5 mg total) by mouth daily.  Dose: 5 mg     levothyroxine 100 mcg tablet  Commonly known as: SYNTHROID  Take 100 mcg by mouth daily.  Dose: 100 mcg     LUMIGAN 0.01 % ophthalmic drops  Administer 1 drop into the left eye nightly.  Dose: 1 drop  Generic drug: bimatoprost     melatonin 10 mg capsule  Take 10 mg by mouth nightly.  Dose: 10 mg     methenamine 1 gram tablet  Commonly known as: HIPREX  Take 1 g by mouth daily before lunch.  Dose: 1 g     metoprolol succinate XL 25 mg 24 hr tablet  Commonly known as: TOPROL-XL  Take 12.5 mg by mouth daily with dinner.  Dose: 12.5 mg     omeprazole 20 mg capsule  Commonly known as: PriLOSEC  Take 20 mg by mouth daily before breakfast.  Dose: 20 mg     potassium chloride 10 mEq CR tablet  Commonly known as: KLOR-CON  Take 10 mEq by mouth 2 (two) times a day.  Dose: 10 mEq     tamsulosin 0.4 mg capsule  Commonly known  "as: FLOMAX  Take 1 capsule (0.4 mg total) by mouth daily.  Dose: 0.4 mg     thiamine HCl 100 mg tablet  Commonly known as: VITAMIN B1  Take 100 mg by mouth daily.  Dose: 100 mg     torsemide 10 mg tablet  Commonly known as: DEMADEX  Take 10 mg by mouth daily.  Dose: 10 mg               INSTRUCTIONS AND FOLLOW-UP     Instructions for after discharge       Call provider for:  difficulty breathing      Call provider for:  extreme fatigue      Call provider for:  persistent dizziness or light-headedness, headache, or visual disturbances      Call provider for:  persistent nausea or vomiting      Call provider for:  rash      Call provider for:  severe uncontrolled pain      Call provider for:  temperature >100.4      Call provider for: blood sugar change      Call provider for blood sugar less than 70 or greater than 350    Call provider for: increased shortness of breath; weight gain (3 pounds/1 day or 5 pounds/1 week); more swelling in legs, ankles, feet or belly; need to sleep with extra pillows or in a chair.      Discharge Diet      Diet Type / Texture: Regular    Follow-up with Provider:      Mat Bryant MD   [phone]    [address]  [place]  [facility] [address]       Follow-up with Provider:      Follow-up with Provider:      Roshan Gaines MD   [phone]    [address] [facility]  [address] [facility] [address]       Follow-up with primary physician (PCP)      Within 1 week. Repeat CBC blood test.    Normal Activity as Tolerated      Review additional activity direction in \"instructions\" section              Important Issues to Address in Follow-Up  Discharge Follow-up Issues: Instructions to Patient:   Additional Discharge Instructions    Dear Mr. Elena,    You came to the Emergency Department because you were having blood in your ostomy bag. You were admitted to the hospital for close monitoring a gastrointestinal bleed. You have had hospital admissions multiple times in the " past due to similar bleeding. The source of your bleeding is likely related to known diverticula, or outpouchings in your colon which can bleed. You received a blood transfusion and your blood counts improved. The Gastroenterology and Surgery teams saw you. Given that your bleeding stopped and considering the risks of a procedure with your medical history, it was recommended not to pursue an endoscopy at this time. Your vital signs remained stable without further signs of bleeding in your ostomy, therefore you are stable for discharge home.    You can continue all of your medications as previously prescribed starting tomorrow morning. You are safe to take your aspirin medication.    Please bring this discharge paperwork to all of your follow up appointments. If you experience recurrence of your symptoms, please do not hesitate to call your primary care doctor or present to the emergency department for evaluation. It was a pleasure taking care of you at [facility]! We wish you the best of health.     TO DO:  [ ] Please follow up with your PCP within 1 week. Repeat a CBC blood test at that time.  [ ] Please follow up with the Colorectal Surgery Dr. Bryant on February 20, 2025 at 1:30 PM.  [ ] Please follow up with Cardiology Dr. Jeffrey on January 14, 2025 at 3:00 PM.    [ ] Continue all of your medications as prescribed and listed below.    [ ] If you notice any recurrent signs of bleeding you should call your doctor right away, if the bleeding is persistent or you develop any symptoms such as abdominal pain, persistent vomiting, passage of large amount of blood then you should call 911 and come to the Emergency Department.           Scheduled Appointments            In 1 month Mat Bryant MD [facility] at [facility]            Recommended Follow-up Visits  Follow-Up After Discharge       Zachery Hernandez MD   Specialty: Internal Medicine, Primary Care   Relationship: PCP -  General        Follow up    Mat Bryant MD   Specialty: Colon and Rectal Surgery, General Surgery   Relationship: Surgeon        Follow up    Follow-up with Provider:        Follow up    Roshan Gaines MD   Specialty: Cardiovascular Disease, Internal Medicine        Follow up            Test Results Pending at Discharge  None    LABS AND IMAGING   Pertinent Labs  CHEMISTRIES   Results from last 7 days   Lab Units 01/01/25  0532 12/31/24  0534 12/31/24  0426 12/30/24 2030   SODIUM mEQ/L 137 138 139 138   CHLORIDE mEQ/L 107 109* 109* 108*   CO2 mEQ/L 22 25 24 22   BUN mg/dL 23 26* 28* 29*   CREATININE mg/dL 1.3 1.5* 1.5* 1.6*   GLUCOSE mg/dL 90 104* 111* 202*   CALCIUM mg/dL 7.9* 8.1* 8.1* 8.4*   EGFR mL/min/1.73m*2 51.2* 43.1* 43.1* 39.9*   ANION GAP mEQ/L 8 4 6 8     HEPATIC FUNCTION TESTS   Results from last 7 days   Lab Units 12/31/24  0534 12/31/24  0426 12/30/24  2030   AST IU/L 25 24 33   ALT IU/L 5* 4* 5*   ALK PHOS IU/L 76 74 85   ALBUMIN g/dL 2.8* 2.8* 3.0*   PROTEIN TOTAL g/dL 6.6 6.6 7.4   BILIRUBIN TOTAL mg/dL 0.9 0.9 0.7     CBC RESULTS   Results from last 7 days   Lab Units 01/01/25  0756 12/31/24  1718 12/31/24  1133 12/31/24  0534 12/31/24  0426   WBC K/uL 4.82 4.58 4.16 4.91 5.10   HEMOGLOBIN g/dL 8.6* 8.7* 7.8* 7.2* 7.2*   HEMATOCRIT % 27.3* 27.2* 24.5* 23.0* 23.1*   PLATELETS K/uL 95* 95* 83* 96* 95*     ANEMIA STUDIES   Results from last 7 days   Lab Units 12/31/24  0426   IRON ug/dL 51   TIBC ug/dL 315   FERRITIN ng/mL 38   FOLATE ng/mL 19.5   VITAMIN B 12 pg/mL 800     COAGULATION   Results from last 7 days   Lab Units 01/01/25  0532 12/31/24  0534 12/31/24  0426   PROTIME sec 18.4* 19.8* 19.9*   INR  1.6 1.7 1.7   PTT sec 32 33 33       Pertinent Imaging  CT ANGIOGRAPHY ABDOMEN PELVIS WITH AND WITHOUT IV CONTRAST    Result Date: 12/31/2024  IMPRESSION: 1.  No evidence of active gastrointestinal bleed. 2.  Nonobstructing renal calculi.  Enlarged prostate gland with chronic bladder  outlet obstruction. 3.  Stable clustered mildly prominent mesenteric lymph nodes in the lower central abdomen. 4.  Stable right internal iliac artery aneurysm measuring 3.5 cm. 5.  Large left and moderate right pleural effusions with associated compressive atelectasis in the lingula and lower lobes, although superimposed infection cannot be entirely excluded.  This has increased on the right compared to 11/26/2024. Preliminary report was provided by overnight radiologist, Dr. Janee Lovell, at 11:51 PM on 12/30/2024.    X-RAY CHEST 1 VIEW    Result Date: 12/31/2024  IMPRESSION: Interval increase in size of large left pleural effusion and probable increase in size of small to moderate right pleural effusion.  Interstitial edema.     IR THORACENTESIS    Result Date: 12/17/2024  IMPRESSION: Successful ultrasound-guided left-sided thoracentesis with 850 mL pleural fluid removed and discarded. I certify that I have personally reviewed this examination and agree with Avril Loya's report. Goyo Burnham M.D.    X-RAY CHEST 1 VIEW    Result Date: 12/17/2024  IMPRESSION:  Cardiomegaly with pulmonary edema and bilateral pleural effusions.    Cardiac Imaging    TRANSTHORACIC ECHO (TTE) COMPLETE 09/24/2024    Interpretation Summary  Technically difficult study.    The left ventricular cavity size is small with mild left ventricular hypertrophy and mildly reduced systolic function. Estimated EF 40-45% by visual estimate. Abnormal septal motion due to RV volume overload. Unable to assess diastolic function due to technically difficult study. Low stroke volume (32 ml).    The aortic valve is tricuspid. Aortic valve and root sclerosis. Mild aortic regurgitation.    The mitral valve is thickened. Mild mitral annular calcification. Trace regurgitation.    The left atrium is normal in size.    The right ventricular cavity is massively dilated with severely decreased systolic function. Apical hypercontractility.    The  pulmonic valve is not well seen. There is trace pulmonic regurgitation.    There is a 13 mm coaptation gap between the leaflets of the tricuspid valve resulting in torrential ( wide -open) tricuspid regurgitation (PISA EROA 2.4cm2, Rvol 96 mL). Tricuspid valve annulus is dilated and measures 6.8 cm in diameter. Unable to assess RVSP in the setting of torrential tricuspid regurgitation.    The right atrium is severely dilated.    The IVC is massively dilated and collapses less than 50% with inspiration consistent with severely elevated right atrial pressure.    No pericardial effusion. Large pleural effusion.    Compared to previous echocardiogram from 3/28/2024, no significant change.      OTHER SERVICES PROVIDED   Consults During Admission  IP CONSULT TO PAIN/PALLIATIVE CARE  IP CONSULT TO GASTROENTEROLOGY  IP CONSULT TO COLORECTAL SURGERY    Procedures Performed  None      Thank you for allowing the Division of Hospital Medicine to care for your patient.        >30 mins spent for coordination/counselling related to discharge plan, including final examination of patient, discussion regarding discharge instructions, reconciliation/prescription of medications, preparation of discharge records, etc.

## 2025-01-03 NOTE — [facility] PHYSICIAN REVIEW NOTE
Post discharge review.    Patient seen in the ER on 12/30 at 8:19 pm for colostomy bleeding.  DC on 1/1 after GI evaluation and hgb trending.    Care crossed 2 MN.  Inpatient is appropriate.    Ana Hayes DO

## 2025-01-09 NOTE — PROGRESS NOTES
"NEUROLOGY PROGRESS NOTE    Subjective     Interval History:  He underwent MRI brain without acute infarct found. No events overnight. He is being transported to [facility].     He states he remembers my voice and that he last saw this provider at Easter time. He denies any complaints other than being \"chilly\" and requesting a blanket.     ROS:     Review of Systems   All other systems reviewed and are negative.       Medications:     Current Facility-Administered Medications   Medication Dose Route Frequency Provider Last Rate Last Admin   • acetaminophen (TYLENOL) 650 mg/20.3 mL solution 650 mg  650 mg oral q4h PRN Avril Chester MD   650 mg at 08/08/22 2152   • aspirin chewable tablet 81 mg  81 mg oral Daily Emilie Alvarez DO   81 mg at 08/18/22 0851   • atorvastatin (LIPITOR) tablet 40 mg  40 mg oral Daily (6p) Joe Eduardo DO       • collagenase (SANTYL) 250 unit/gram ointment 1 application  1 application Topical Daily Tin Singer MD   1 application at 08/17/22 0900   • glucose chewable tablet 16-32 g of dextrose  16-32 g of dextrose oral PRN Joe Eduardo DO        Or   • dextrose 40 % oral gel 15-30 g of dextrose  15-30 g of dextrose oral PRN Joe Eduardo DO        Or   • glucagon (GLUCAGEN) injection 1 mg  1 mg intramuscular PRN Joe Eduardo DO        Or   • dextrose 50 % in water (D50) injection 12.5 g  25 mL intravenous PRN Joe Eduardo DO       • heparin (porcine) 5,000 unit/mL injection 5,000 Units  5,000 Units subcutaneous q8h Samy Romeo DO   5,000 Units at 08/18/22 0626   • HYDROmorphone (DILAUDID) injection 0.25 mg  0.25 mg intravenous q3h PRN Connor Evans MD       • insulin regular U-100 (HumuLIN R,NovoLIN R) injection 3-5 Units  3-5 Units subcutaneous q6h Madonna Bacon MD   3 Units at 08/17/22 2304   • insulin regular U-100 (HumuLIN R,NovoLIN R) injection 4 Units  4 Units subcutaneous q6h Madonna Bacon MD   4 Units at 08/18/22 0633   • " Medication is attached.  Pharmacy has been verified and attached.       lansoprazole (PREVACID) 3 mg/mL oral suspension 30 mg  30 mg feeding tube Daily Samy Haddad DO   30 mg at 08/18/22 0846   • levothyroxine (SYNTHROID) tablet 100 mcg  100 mcg oral Daily (6:30a) Madonna Dent MD   100 mcg at 08/18/22 0626   • metoprolol tartrate (LOPRESSOR) tablet 25 mg  25 mg oral BID Joe Eduardo DO       • potassium chloride 20 mEq in 100 mL IVPB  (premix)  20 mEq intravenous Once Joe Eduardo DO 50 mL/hr at 08/18/22 0846 20 mEq at 08/18/22 0846   • potassium phosphates 20 mmol in sodium chloride 0.9 % 250 mL IVPB  20 mmol intravenous Once Joe Eduardo DO       • sodium hypochlorite (DAKIN'S (QUARTER-STRENGTH)) 0.125 % external solution   Topical Daily Samy Haddad DO   Given at 08/17/22 0900   • torsemide (DEMADEX) tablet 10 mg  10 mg oral Daily Joe Eduardo DO   10 mg at 08/18/22 0851       Allergies:     Jardiance [empagliflozin]     Objective:     Objective     Vital signs in last 24 hours:  Temp:  [37.5 °C (99.5 °F)] 37.5 °C (99.5 °F)  Heart Rate:  [87-98] 92  Resp:  [18-20] 18  BP: ()/(50-76) 107/66    Physical Exam:  Physical Exam  HENT:      Head: Normocephalic and atraumatic.   Neurological:      Mental Status: He is alert.   Psychiatric:         Speech: Speech is slurred.       Neurologic Exam     Mental Status   Disoriented to person: oriented to name. Disorient to birthday.   Oriented to place.   Disoriented to month. Oriented to year. (States it's September)  Follows 2 step commands.   Speech: slurred   Level of consciousness: alert  Able to name object. Able to repeat.     Cranial Nerves     CN III, IV, VI   Right pupil: Size: 3 mm. Shape: regular. Reactivity: brisk.   Left pupil: Size: 3 mm. Shape: regular. Reactivity: brisk.   Nystagmus: none   Upgaze: normal  Downgaze: normal  Conjugate gaze: present    CN VII   Facial expression full, symmetric.     CN VIII   Hearing: impaired (hearing aids bilaterally in place)    CN XII   Tongue deviation:  none    Motor Exam   Right arm pronator drift: present (elbow hits bed within 7-8 seconds)  Left arm pronator drift: present (elbow hits bed within 5-6 seconds)  Decreased Adduction in left shoulder. Strength in bilateral upper extremities 4/5 throughout     BLE:  HF 2/5  KF/KE: 3/5  DF/PF: 5/5     Gait, Coordination, and Reflexes     Reflexes   Right ankle clonus: absent  Left ankle clonus: absent      Labs:   I have reviewed the patient's lab results, and noted pertinent findings. I have reviewed the labs as noted below:    Recent Results (from the past 24 hour(s))   POCT Glucose    Collection Time: 08/17/22 12:32 PM   Result Value Ref Range    POCT Bedside Glucose 206 (H) 70 - 99 mg/dL    POC Test POC    POCT Glucose    Collection Time: 08/17/22  5:44 PM   Result Value Ref Range    POCT Bedside Glucose 178 (H) 70 - 99 mg/dL    POC Test POC    Basic metabolic panel    Collection Time: 08/17/22  9:33 PM   Result Value Ref Range    Sodium 138 136 - 144 mEQ/L    Potassium 3.7 3.6 - 5.1 mEQ/L    Chloride 109 98 - 109 mEQ/L    CO2 24 22 - 32 mEQ/L    BUN 27 (H) 8 - 20 mg/dL    Creatinine 0.7 (L) 0.8 - 1.3 mg/dL    Glucose 192 (H) 70 - 99 mg/dL    Calcium 7.7 (L) 8.9 - 10.3 mg/dL    eGFR >60.0 >=60.0 mL/min/1.73m*2    Anion Gap 5 3 - 15 mEQ/L   POCT Glucose    Collection Time: 08/17/22 11:02 PM   Result Value Ref Range    POCT Bedside Glucose 187 (H) 70 - 99 mg/dL    POC Test POC    Calcium, ionized    Collection Time: 08/18/22  5:24 AM   Result Value Ref Range    Ionized Calcium, Venous 1.11 (L) 1.15 - 1.27 mmol/L   Comprehensive metabolic panel    Collection Time: 08/18/22  5:24 AM   Result Value Ref Range    Sodium 137 136 - 144 mEQ/L    Potassium 3.6 3.6 - 5.1 mEQ/L    Chloride 108 98 - 109 mEQ/L    CO2 24 22 - 32 mEQ/L    BUN 27 (H) 8 - 20 mg/dL    Creatinine 0.7 (L) 0.8 - 1.3 mg/dL    Glucose 166 (H) 70 - 99 mg/dL    Calcium 7.6 (L) 8.9 - 10.3 mg/dL    AST (SGOT) 87 (H) 15 - 41 IU/L    ALT (SGPT) 33 16 - 63 IU/L     Alkaline Phosphatase 123 35 - 126 IU/L    Total Protein 4.9 (L) 6.0 - 8.2 g/dL    Albumin 1.3 (L) 3.4 - 5.0 g/dL    Bilirubin, Total 0.9 0.3 - 1.2 mg/dL    eGFR >60.0 >=60.0 mL/min/1.73m*2    Anion Gap 5 3 - 15 mEQ/L   Magnesium    Collection Time: 08/18/22  5:24 AM   Result Value Ref Range    Magnesium 1.9 1.8 - 2.5 mg/dL   Phosphorus    Collection Time: 08/18/22  5:24 AM   Result Value Ref Range    Phosphorus 2.0 (L) 2.4 - 4.7 mg/dL   Protime-INR    Collection Time: 08/18/22  5:24 AM   Result Value Ref Range    PT 15.9 (H) 12.2 - 14.5 sec    INR 1.3     CBC    Collection Time: 08/18/22  5:24 AM   Result Value Ref Range    WBC 8.51 3.80 - 10.50 K/uL    RBC 2.82 (L) 4.50 - 5.80 M/uL    Hemoglobin 9.8 (L) 13.7 - 17.5 g/dL    Hematocrit 29.2 (L) 40.1 - 51.0 %    .5 (H) 83.0 - 98.0 fL    MCH 34.8 (H) 28.0 - 33.2 pg    MCHC 33.6 32.2 - 36.5 g/dL    RDW 16.2 (H) 11.6 - 14.4 %    Platelets 113 (L) 150 - 350 K/uL    MPV 10.9 9.4 - 12.4 fL   POCT Glucose    Collection Time: 08/18/22  7:36 AM   Result Value Ref Range    POCT Bedside Glucose 134 (H) 70 - 99 mg/dL    POC Test POC              Imaging:     X-RAY ABDOMEN 1 VIEW    Result Date: 8/14/2022  Narrative: CLINICAL HISTORY: placement of enteral feeding tube with weighted end COMMENT: Single view of the abdomen. Enteric tube tip in the stomach. Bibasilar pleural-parenchymal opacity. Unremarkable visualized upper abdominal bowel gas pattern.     Impression: IMPRESSION: Enteric tube tip in the stomach.    X-RAY ABDOMEN 1 VIEW    Result Date: 8/3/2022  Narrative: CLINICAL HISTORY: OG tube placement COMMENT: 2 supine AP views of the abdomen are performed. Comparison: Chest x-ray examination performed one hour earlier An enteric catheter is in place with its tip at the expected location of the GE junction. Surgical material is noted in the left upper quadrant and in the right upper quadrant of the abdomen. There is curvilinear calcification in the left upper  quadrant which appears somewhat tubular and may be related to vascular calcification. Sternotomy wires and mediastinal clips are present. The distal aspect of a right internal jugular central venous catheter is noted with its tip at the level of the cavoatrial junction. Bilateral pleural effusions with adjacent airspace disease are noted.     Impression: IMPRESSION: Enteric catheter with its tip at the expected location of the GE junction. At the time of interpretation, a subsequent x-ray demonstrating adequate placement has been performed.    X-RAY ABDOMEN 1 VIEW    Result Date: 8/3/2022  Narrative: CLINICAL HISTORY: OG tube placement COMMENT: A supine AP view of the lower chest and upper abdomen has been performed. Comparison: Examination performed one hour prior     Impression: IMPRESSION: An enteric catheter now courses below the diaphragm with its tip overlying the expected location of the stomach. The remainder of the findings in the lower chest and upper abdomen appear stable.    CT HEAD WITHOUT IV CONTRAST    Result Date: 7/22/2022  Narrative: CLINICAL HISTORY:  Dizziness, persistent, recurring Dizziness, persistent/recurrent, cardiac or vascular cause suspected     Impression: IMPRESSION: No evidence of an acute territorial infarct or intracranial hemorrhage.  Sequela chronic small vessel ischemic disease. COMMENT: Comparison:  None. TECHNIQUE: CT of the brain was performed and reconstructed/reformatted in the axial, coronal and sagittal planes. CT DOSE:  One or more dose reduction techniques (e.g. automated exposure control, adjustment of the mA and/or kV according to patient size, use of iterative reconstruction technique) utilized for this examination. INTRAVENOUS CONTRAST: None Brain parenchyma:  Age related involution and  ischemic small vessel changes. Gray-white matter differentiation is normal.  No evidence of intracranial hemorrhage, midline shift or other mass effect. Ventricles and cisterns:   Unchanged in size and configuration. Cranial carotid arteries: Mural calcification. Calvarium and extra cranial soft tissues:  No evidence of a displaced calvarial fracture.   Scalp soft tissue within normal limits. Visualized paranasal sinuses and mastoid air cells:  Mucosal thickening of the visualized paranasal sinuses.     MRI BRAIN WITHOUT CONTRAST    Result Date: 8/17/2022  Narrative: CLINICAL HISTORY: Dysarthria and left-sided arm and leg weakness as well as ataxia. Atrial fibrillation. COMMENT: Unenhanced magnetic resonance imaging of the brain was performed utilizing standard [facility] protocol. Comparison: CT performed 7/22/2022 and MRI performed 7/23/2022 Findings: The ventricles, sulci and cisternal spaces are prominent consistent with involutional changes. No abnormal areas of restricted diffusion are identified to suggest acute infarction. Patchy foci of increased T2 signal are present in the periventricular and subcortical white matter which are nonspecific but likely represent mild to moderate chronic microvascular ischemic changes. No acute intracranial hemorrhage, intra-axial mass-effect or extra-axial fluid collection is identified. There is redemonstration of irregularity of the basilar artery which is better assessed on the prior angiographic examination. Mucosal thickening is present in the left greater than right ethmoid air cells. There is mild partial opacification of the right mastoid air cells.     Impression: IMPRESSION: No acute infarct, acute intracranial hemorrhage or mass effect. Chronic microangiopathy and involutional changes.     MRI BRAIN WITHOUT CONTRAST, MRI ANGIOGRAM HEAD WITHOUT CONTRAST, MRI ANGIOGRAM NECK WITHOUT CONTRAST    Result Date: 7/23/2022  Narrative: STUDY:  MRI of the Brain without contrast CLINICAL HISTORY: Garbled speech, gait instability, transient weakness. Intracranial atherosclerotic disease and white matter changes.     Impression: IMPRESSION: 1. No  acute intracranial abnormality, no acute infarct. 2. Mild microangiopathic changes. 3. 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 4. Unremarkable MRA of the neck. COMMENT: Technique:  The brain was scanned in multiple planes with a variety of pulse sequences without contrast. Comparison:  Head CT dated July 22, 2022. Findings: Mild scattered periventricular and subcortical white matter T2 hyperintensities, nonspecific, likely sequela of microangiopathic disease. Sulci, ventricles and basal cisterns are within normal limits for patient's stated age. No mass-effect, intracranial hemorrhage, acute segmental infarct or extra-axial fluid collection is seen. Cerebellar tonsils are normal in position. Expected signal voids are seen in the intracranial vessels at the skull base. The orbits and sella are unremarkable.   The paranasal sinuses demonstrates circumferential mucosal thickening of the right maxillary sinus.  The mastoid air cells are clear. MR angiography head and neck without contrast Technique: An MRA of the [place] was performed utilizing a 3D time-of-flight technique. MRA of the neck was also performed utilizing 2-D and 3-D time-of-flight technique. No prior studies are available for comparison. 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 Left vertebral artery is dominant. The right vertebral artery ends in PICA. Near fetal origin left PCA. No medium or large size aneurysms are seen in the [place]. Please note that MRA may be insensitive in the detection of aneurysms smaller than 4 mm. MRA of the neck demonstrates patency of the common carotid arteries, internal carotid arteries and vertebral arteries bilaterally. No evidence for vessel dissection, cutoff, hemodynamically significant  stenoses by NASCET criteria.    FLUOROSCOPY VIDEO SWALLOW WITH SPEECH    Result Date: 8/16/2022  Narrative: ICD10: R13.12 CLINICAL HISTORY: Dysphagia status-post prolonged intubation COMMENT:  A video swallow study was conducted jointly by Speech Pathology and Radiology to determine the risks or presence of dysphagia, and to delineate a plan of care as indicated.  The patient was provided with the following substances:  barium impregnated puree, mechanical chopped, soft textures, regular, thin liquids, nectar thick liquids CLINICAL COURSE: The patient is a 89-year-old male, with a recent onset of dysarthria without clear etiology (~1 month per family),  who presented to outpatient Dr. Secondary to biotic and pubic pain.  Patient was found to have evidence of necrotizing soft tissue infection and bloody purulent fluid and multiple thrombosed vessels.  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  The patient was intubated 8/3-8/12.  Following extubation the patient required heated high flow nasal cannula.  A bedside dysphagia swallow evaluation was completed on 8/13 with findings of at least moderate oral stage dysphagia and suspected pharyngeal stage dysphagia, dysphonia dysarthria (baseline).  The patient was recommended nothing by mouth.  With further bedside follow-up, the patient was initiated on a modified diet (puree, mildly thick liquids) at the bedside, with reduced oxygen requirements.  With ongoing bedside follow-up, the patient showed inconsistent symptoms of aspiration with thin liquids. On this day, the patient reported inconsistent coughing with intake of current diet.  In setting of patient's prolonged intubation, inconsistent symptoms of aspiration at the bedside, a video swallow was recommended to objectively assess swallow function and determine least restrictive diet. PAST MEDICAL HISTORY: Atrial  fibrillation, disease of thyroid gland, gastrointestinal bleed, hypertension, myocardial infarction, dysarthria with unclear etiology VIEW: Lateral view. The patient presented in a stretcher and was fed by this clinician as patient is grossly unable to self-feed. FLUOROSCOPY TIME: 5.1 minutes Entrance Dose: 6.3 mGy Number of Series: 11 loops ORAL STAGE: Moderate to severe oral stage. There was reduced oral access as the patient had difficulty with labial seal on cup and straw. The patient had reduced oral containment with anterior spillage noted bilaterally with liquids (inconsistently). Mastication of higher-level solids was not assessed due to the severity of the pharyngeal stage of swallow, however, the patient was observed with excessive, perseverative mastication of pureed solids. There was decreased cohesive bolus formation, severely delayed anterior-posterior transfers despite verbal and visual cues. There was posterior pre-swallow bolus leakage to the level of the mid pharynx with solids and to the distal pharynx with thin and mildly thick liquids. PHARYNGEAL STAGE: Severe pharyngeal stage dysphagia. The patient independently kept head in the chin tuck position despite verbal and tactile cues for head in neutral position. The Dobbhoff tube is present in the pharynx, there appeared to be thick secretions adhered to this tube throughout the study. There are osteophytic spurs observed along the cervical spine which appeared largely non-inhibitory to bolus flow. There was a swallow delay allowing posterior pre-swallow bolus leakage to the mid pharynx with solids and the distal pharynx with liquids. There was reduced pharyngeal motility. Tongue-base retraction was reduced allowing for residuals on the tongue-base and in the vallecular space. On the initial swallow of the study (teaspoon of puree), ~90% of the bolus remained in the pharynx. With further dry swallows, there was some improved clearance, however,  complete clearance was not achieved. A liquid wash was found to be somewhat effective at further clearing residuals, but not completely. There was severely reduced to absent epiglottic inversion. Hyolaryngeal excursion appeared grossly functional. There were residuals noted on the Dobbhoff tube throughout the study. There was aspiration noted pre-swallow with teaspoon of thin liquids (versus residuals in the pharynx) secondary to swallow delay. There was chronic penetration and subsequent aspiration during the swallow with all consistencies secondary to incomplete epiglottic displacement and poor airway closure, allowing materials to enter the laryngeal vestibule and eventually descend below the true vocal folds and into the trachea. There was no sensory response to these aspiration events. A cued cough was very weak and ineffective at clearing the materials from the airway. Prep-set was trialed, deemed ineffective at eliminating aspiration. Patient showed increased fatigue, reduced ability to follow commands, therefore, further compensatory strategies (breath hold) were not trialed. ESOPHAGEAL STAGE: The esophagus was not assessed during today's study. SPEECH THERAPIST RECOMMENDATIONS: 1. Strict nothing by mouth, medication and nutrition via Dobbhoff tube 2. Strict aspiration precautions including frequent and stringent oral care with suction as needed 3. Could consider Otolaryngology consult in setting of prolonged intubation and observed poor airway closure. Consider direct visualization of vocal folds to rule out vocal fold dysfunction as etiology of dysphagia 4. Could consider neurology consult due to unclear etiology of the patient's oral motor weakness, dysarthria and dysphagia. 5. Palliative care is following the patient, consider discussion to outline the patient's goals of care (e.g., PEG tube versus eating at known risk of aspiration) 6. Speech therapy for ongoing dysphagia follow-up to review the results  of this study. Consider training in tongue-base retraction exercises, thermal/gustatory stimulation (ice chips) with goal of improving the timing of the swallow initiation. Would consider training in breath hold with goal of effective use (for trialing purposes) in repeat video swallow. Would recommend repeat video swallow prior to initiation of diet due to patient's consistent silent aspiration.     Impression: IMPRESSION: 1. Moderate to severe oral stage dysphagia. 2. Severe pharyngeal stage dysphagia characterized by swallow delay, reduced tongue-base retraction, severely reduced to absent epiglottic inversion, chronic silent aspiration with all consistencies. A cued cough was ineffective at clearing aspirated materials. 3. The esophagus was not assessed during today's study. Betsy Benitez PA-C was present for this examination. Dr. Alice Dumont agrees only with the radiographic findings.  Specific swallowing recommendations are solely the purview of the Speech Pathology Division. This study was conducted and written by Candace Feldman MS, CCC-SLP/L.    Cardiac catheterization    Result Date: 8/5/2022  Narrative: FINDINGS 1. Slightly Elevated right and left heart filling pressures (RA 15, PA 35/18, and PCW 20 mm Hg). 2. Cardiac output and index of 4.87 L/min and 2.4 L/min/m2 respectively.  RECOMMENDATIONS 1. IV diuresis. 2. Continue vasopressor / inotropic support.     X-RAY CHEST 1 VIEW    Result Date: 8/15/2022  Narrative: CLINICAL HISTORY: intubated   . PROCEDURE: AP erect portable chest radiograph. COMPARISON: Chest radiograph of August 13, 2022. COMMENT: Support lines, tubes, devices, and surgical hardware: Sternal sutures. Enteric tube with tip off the inferior margin of the image. Right arm PICC with tip at cavoatrial junction. Lungs: Left lower lobe consolidation, unchanged. Pleura: Bilateral pleural effusions, greater on the right. No pneumothorax Heart/mediastinum: Stable cardiomegaly. Bones/soft  tissues: Intact.     Impression: IMPRESSION: Left lower lobe consolidation and possible small pleural effusion. Larger right pleural effusion.    X-RAY CHEST 1 VIEW    Result Date: 8/13/2022  Narrative: CLINICAL HISTORY: Difficulty breathing.     Impression: IMPRESSION: Bilateral effusions. COMMENT: Two AP radiographs of the chest reveal cardiomegaly.  Sternal sutures are seen.  There is a prosthetic valve ring.  Right PICC is seen in place. There are bilateral effusions right greater than left.  There is no pneumothorax.  Surgical staples overlie the left hilum.  Endotracheal tube and nasogastric tube seen on the previous exam have been  Removed    X-RAY CHEST 1 VIEW    Result Date: 8/12/2022  Narrative: CLINICAL HISTORY: intubated COMMENT: A single portable radiograph of the chest was obtained on 8/12/2022 at 0621 hours. Comparison: Chest x-ray August 11, 2022. Bibasilar effusions. Right PICC, ET tube, NG tube, cardiac silhouette, median sternotomy, and osseous structures are stable.     Impression: IMPRESSION: Overall, no significant interval change.    X-RAY CHEST 1 VIEW    Result Date: 8/11/2022  Narrative: CLINICAL HISTORY: PICC line placement COMPARISON: Earlier today. COMMENT: Frontal view of the chest is obtained portably on 8/11/2022 at 1715. The right-sided PICC line is seen with its tip at the cavoatrial junction. Endotracheal tube tip is just below the clavicular heads. Cardiomegaly is stable. Airspace disease and effusion at the bases is stable. There is no pneumothorax or abnormal mediastinal widening.     Impression: IMPRESSION: PICC line tip at the cavoatrial junction.    X-RAY CHEST 1 VIEW    Result Date: 8/11/2022  Narrative: CLINICAL HISTORY: intubated COMMENT: Double frontal view of the chest was obtained and compared with a 10/20/2022. Endotracheal tube, tip approximately 2.3 cm above pamella. Right internal jugular central venous catheter, tip overlying the right atrium. Enteric tube, which  courses below the diaphragm and tip out of the field of view. Sternotomy wires. Clips overlying the mediastinum. Worsening of bibasilar airspace opacities, likely increasing pleural effusions and atelectasis. Mild pulmonary edema. No pneumothorax. Stable mild cardiomegaly.     Impression: IMPRESSION: Please see comment.    X-RAY CHEST 1 VIEW, X-RAY ABDOMEN 1 VIEW    Impression: IMPRESSION: The patient is rotated. ET tube tip projects 4 cm above the pamella. PA catheter with tip overlying the right pulmonary artery. Right IJ approach central venous catheter with tip projecting at the level of the cavoatrial junction. NG/OG tube is seen coursing into the stomach. Sidehole likely in the region of the distal gastric body. The patient is status post CABG with postsurgical changes noted. Many of the sternotomy wires are fractured. Monitoring leads/wires overly the patient. There are small bibasilar pleural effusions with underlying atelectasis and mild central vascular congestion. COMMENT: Support lines, tubes, devices, and surgical hardware, material: See IMPRESSION Lungs and Pleura: See IMPRESSION. No pneumothorax. Cardiovascular and Mediastinum: The cardiomediastinal silhouette is stable noting cardiomegaly and postoperative change. Other: Degenerative changes of the visualized spine. CLINICAL HISTORY:   tube reposition PROCEDURE: Single frontal view of the chest. COMPARISON:   8/3/2022     X-RAY CHEST 1 VIEW    Result Date: 8/10/2022  Narrative: CLINICAL HISTORY: Tube positioning. COMMENT: Comparison: 8/10/2022 5:11 AM Technique: Single portable AP view of the thorax was performed. Findings: Tubes and Lines: Endotracheal tube is with its tip approximately 3 cm from the level of the pamella. Nasogastric tube seen traversing the level of the diaphragm, however the tip is not visualized. Right internal jugular central venous catheter is with its tip overlying the region of the cavoatrial junction. Left internal jugular  [place]-Niki catheter is with its tip overlying the region of the right main pulmonary artery. Overlying cardiac leads. Lungs: Suggestion of layering right greater than left pleural effusions with associated bibasilar opacities, likely similar compared to earlier study of the same day. No pneumothorax is identified. Cardiomediastinal silhouette: Stable cardiomegaly. Uncoiled thoracic aorta. Median sternotomy wires.     Impression: IMPRESSION: Endotracheal tube with tip approximately 3 cm in the level of the pamella. Additional tubes and lines as described. Suggestion of layering right greater than left pleural effusions with associated bibasilar opacities, likely similar compared to earlier study of the same day.    X-RAY CHEST 1 VIEW    Result Date: 8/9/2022  Narrative: CLINICAL HISTORY: intubated COMMENT: Portable frontal view of the chest was obtained and compared with 8/8/2022. Endotracheal tube, tip approximately 3.8 cm above pamella. Sternotomy wires. Clips overlying the mediastinum. Enteric tube, which courses below the diaphragm with tip out of the field of view. Left internal jugular pulmonary arterial catheter, tip overlying the distal main right pulmonary artery or interlobar pulmonary artery, similar compared with 8/8/2022. Right internal jugular central venous catheter, tip overlying superior right atrium. Small bilateral pleural effusions with bibasilar airspace opacities, likely atelectasis. No pneumothorax. Stable cardiomegaly.     Impression: IMPRESSION: No significant change since 8/8/2022.    X-RAY CHEST 1 VIEW    Result Date: 8/8/2022  Narrative: CLINICAL HISTORY: Respiratory failure. COMMENT: Semiupright portable view of the chest was obtained at 0714 hours. Comparison with a similar study from 8/8/2022. Endotracheal tube noted with its tip just below the level of the clavicles. An endogastric tube courses below the left hemidiaphragm and out of the field-of-view. Right internal jugular approach  central venous catheter seen with its tip in the right atrium superiorly. Left internal jugular approach pulmonary arterial catheter noted with its tip in the right lower lobe pulmonary artery. There are operative changes of previous CABG. There is pleural-parenchymal opacity at the lung bases, right greater than left. Skeletal structures are stable. There is no pneumothorax.     Impression: IMPRESSION: Stable chest.     X-RAY CHEST 1 VIEW    Result Date: 8/8/2022  Narrative: CLINICAL HISTORY: Fever. COMMENT: Semiupright portable view of the chest was obtained at 0043 hours. Comparison with a similar study from 8/7/2022. A right internal jugular approach central venous catheter is seen with its tip in the right atrium. Endotracheal tube tip noted below the clavicles. Endogastric tube courses below left hemidiaphragm and out of the field-of-view. There are operative changes of previous CABG. The heart is stable in size. There are atherosclerotic calcifications of the thoracic aorta. Hazy opacity at the right lung base likely represents a combination of small pleural effusion and associated partial atelectasis. Skeletal structures are stable.     Impression: IMPRESSION: Stable chest.     X-RAY CHEST 1 VIEW    Impression: IMPRESSION: Small right greater than left pleural effusions with underlying atelectasis. Support devices in place as described below. COMMENT: Support lines, tubes, devices, and surgical hardware, material: Endotracheal tube tip 3.37 m above the pamella. NG/OG tube is seen coursing into the stomach. Left IJ approach PA catheter with tip overlying the right pulmonary artery. Right IJ approach central venous catheter with tip projecting at the level of the upper right atrium. Sternal wires and mediastinal clips typical prior CABG, with several fractured sternal wires. Monitoring leads overlie the patient. Lungs and Pleura: See Impression. No pneumothorax. Cardiovascular and Mediastinum: The  cardiomediastinal silhouette is stable noting postoperative change. Other: Osseous structures appear unchanged. CLINICAL HISTORY:   intubated PROCEDURE: Single frontal view of the chest. COMPARISON:   Comparison is made to exam on the prior day     X-RAY CHEST 1 VIEW    Result Date: 8/6/2022  Narrative: CLINICAL HISTORY: Intubated.     Impression: IMPRESSION: Bilateral small effusions COMMENT: Two AP radiographs the chest reveal cardiomegaly.  There are bilateral effusions.  Endotracheal tube, left-sided central venous catheter, right-sided [place]-Niki catheter and nasogastric tube are in appropriate position.  Sternal sutures are seen.  Surgical staples overlie the cardiac silhouette.  Comparison is made to previous study dated 8/5/2022 and has been no significant interval change.    X-RAY CHEST 1 VIEW    Result Date: 8/5/2022  Narrative: CLINICAL HISTORY: Respiratory failure, mechanical ventilation COMMENT: Portable frontal views of the chest are obtained. Comparison--8/4/2022 chest x-ray ET tube and right jugular central venous catheter are stable in position. Nasogastric tube is stable in course through the mediastinum and appears to extend below the diaphragm. Interval placement of a left jugular approach [place]-Niki catheter with its tip projecting over the right pulmonary artery. No pneumothorax. Stable right greater than left pleural-parenchymal opacities. No definite new consolidation or evidence of florencio congestive heart failure. Cardiomediastinal and hilar contours appear grossly stable.     Impression: IMPRESSION: 1. [place]-Niki catheter placement. Tip projecting over the right pulmonary artery laterally. No pneumothorax. 2. Otherwise little change    X-RAY CHEST 1 VIEW    Result Date: 8/4/2022  Narrative: CLINICAL HISTORY: Intubation COMMENT: Portable semierect frontal view of the chest is compared to chest x-ray the prior day.  Endotracheal tube appears similarly positioned.  Other lines and catheters  appears similar to the extent visualized.  Stable prominence the cardiac silhouette.  Stable pattern of probable bilateral effusions/lower lobe atelectasis, right greater than left.     Impression: IMPRESSION: Stable chest    X-RAY CHEST 1 VIEW    Result Date: 8/3/2022  Narrative: CLINICAL HISTORY: Intubated COMMENT: A portable semiupright AP view of the chest was performed. Comparison: 13 hours earlier Cardiac monitoring leads obscure portions of the underlying lungs. An endotracheal tube remains in place with its tip in satisfactory position. An enteric catheter courses below the diaphragm with its tip not included in the field-of-view of the current examination. A right internal jugular central venous catheter is in place with its tip at the expected location of the proximal right atrium. The cardiac silhouette is enlarged. Sternotomy wires and mediastinal clips are present. Aortic atherosclerosis is present. Right greater than left pleural effusions are present with adjacent airspace disease and interstitial prominence. The right pleural effusion is moderate to large and the left pleural effusion is small. Surgical material is noted in the left upper quadrant of the abdomen.     Impression: IMPRESSION: See above.    X-RAY CHEST 1 VIEW    Result Date: 8/3/2022  Narrative: CLINICAL HISTORY: Postoperative COMMENT: A portable supine AP view of the chest was performed. Comparison: 7/22/2022 Cardiac monitoring leads obscure portions of the underlying lungs. An endotracheal tube is in place with its tip in satisfactory position. A right-sided internal jugular central venous catheter is in place with its tip at the expected location of the cavoatrial junction. An enteric catheter is in place with its tip at the expected location of the GE junction. At the time of interpretation, a subsequent x-ray demonstrating adequate placement has been performed. The cardiac silhouette is enlarged. Sternotomy wires and mediastinal  clips are present. Aortic atherosclerosis is noted. Right greater than left pleural effusions are present with adjacent airspace disease and interstitial prominence. Surgical material is noted in the left upper quadrant of the abdomen and surgical clips are noted in the right upper quadrant of the abdomen.     Impression: IMPRESSION: See above.    X-RAY CHEST 1 VIEW    Result Date: 7/23/2022  Narrative: CLINICAL HISTORY: Ataxia.     Impression: IMPRESSION: Findings suspicious for congestive heart failure. COMMENT: AP radiograph of the chest was obtained.  We have no prior similar studies for comparison.  There is cardiomegaly.  There is vascular congestion/interstitial edema.  There are bilateral effusions.  Findings suggest congestive heart failure.  Sternal sutures are seen.  There is no pneumothorax. We have no prior similar studies for comparison.    Ultrasound venous leg right    Result Date: 7/23/2022  Narrative: CLINICAL HISTORY: Right lower extremity edema COMMENT: Ultrasound examination of the right lower extremity venous system is performed utilizing gray scale, color, and pulsed Doppler sonography. Comparison: There are no prior examinations available for comparison. There is normal response to compression within the right  common femoral, superficial femoral, and popliteal veins. Normal color-flow, venous waveforms, and response to augmentation is identified.  Calf veins are suboptimally visualized secondary to soft tissue edema.     Impression: IMPRESSION: No evidence for right -sided deep venous thrombosis in the visualized veins.    Transthoracic echo (TTE) limited    Result Date: 8/3/2022  Narrative: · Normal-sized LV. Mildly decreased LV systolic function. Estimated EF 40- 45%. Markedly abnormal LV septal wall motion. Normal LV wall thickness. Unable to assess diastolic filling pattern of the LV. · Severely dilated RV. Severely reduced RV systolic function. · Mildly dilated LA. · Severely dilated  RA. Catheter present in the RA. · Aorta not well visualized. Aortic root sclerotic. · Sclerotic aortic valve leaflets. Trace aortic valve regurgitation. · Sclerotic mitral valve. Trace mitral valve regurgitation. · Pulmonic valve not well visualized. · Dilated tricuspid annulus with leaflet failure to coapt. Large gap visualized, approximately 1 cm. Severe tricuspid valve regurgitation. Vena contracta 1.0cm. Unable to estimate RVSP in the setting of severe tricuspid regurgitation and single-chamber physiology. Reversed hepatic vein systolic flow. · IVC is dilated (>2.1cm). IVC collapses <50% during inspiration. · No evidence of pericardial effusion. · No prior study available for comparison.           I have personally reviewed neuro-imaging as well as reviewed with supervising physician.     Assessment and Plan:     Samy Elena is a 89 y.o. 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 He underwent surgical management with wound debridement and wash out. His hospital course has been complicated by septic and cardiogenic shock requiring multiple IV pressors. He was eventually extubated on 8/12/2022. After his extubation he underwent multiple speech and swallowing assessments, noted to have moderate-severe oral dysphagia and persistent severe silent aspiration with all consistencies.     He had persistent dysarthria and noted to have left greater than right weakness, along with ataxia. He has known mild-moderate proximal basilar stenosis. Symptoms were concerning for brainstem lesion. MRI brain was completed without evidence of acute infarction.  Upon further review of MRI there is more evidence of FLAIR changes noted in the brainstem, new compared to previous MRI a month  ago which may represent subacute small vessel infarct.     For further neurologic management:  1. Please continue Antiplatelet therapy with Aspirin 81 mg daily at this time, unless there are any surgical contraindications against such treatment.   2. Please continue high intensity Statin therapy with Atorvastatin 40 mg daily, unless any medical contraindications are noted.  3. Please maintain appropriate Blood Pressure support, with suggested goal MAP > 70 at all times.  4. Please maintain appropriate glycemic control, avoiding significant/sustained hypoglycemia and hyperglycemia in this case  5. Please obtain PT/OT evaluations when possible  6. Please follow up SLP recommendations regarding dysphagia management. Please maintain strict Aspiration precautions.  7. Please use appropriate DVT and GI prophylaxis.      I have personally seen this patient, reviewed the history and all the available data including lab values and imaging, performed the physical examination as documented above, and formulated the plan of care. Patient was also seen in conjunction with supervision physician Dr. Chapman. Please see attestation for any further recommendations. All questions the patient and her/his family have are answered.

## 2025-01-16 ENCOUNTER — APPOINTMENT (OUTPATIENT)
Dept: RADIOLOGY | Facility: HOSPITAL | Age: 89
End: 2025-01-16
Attending: PHYSICIAN ASSISTANT
Payer: MEDICARE

## 2025-01-16 ENCOUNTER — HOSPITAL ENCOUNTER (OUTPATIENT)
Dept: RADIOLOGY | Facility: HOSPITAL | Age: 89
Discharge: HOME | End: 2025-01-16
Attending: PHYSICIAN ASSISTANT | Admitting: RADIOLOGY
Payer: MEDICARE

## 2025-01-16 VITALS
SYSTOLIC BLOOD PRESSURE: 102 MMHG | RESPIRATION RATE: 20 BRPM | DIASTOLIC BLOOD PRESSURE: 60 MMHG | OXYGEN SATURATION: 96 % | HEART RATE: 77 BPM

## 2025-01-16 DIAGNOSIS — J90 EXUDATIVE PLEURISY: ICD-10-CM

## 2025-01-16 PROCEDURE — 0W9B30Z DRAINAGE OF LEFT PLEURAL CAVITY WITH DRAINAGE DEVICE, PERCUTANEOUS APPROACH: ICD-10-PCS | Performed by: RADIOLOGY

## 2025-01-16 PROCEDURE — BB4BZZZ ULTRASONOGRAPHY OF PLEURA: ICD-10-PCS | Performed by: RADIOLOGY

## 2025-01-16 PROCEDURE — 71045 X-RAY EXAM CHEST 1 VIEW: CPT

## 2025-01-16 PROCEDURE — 36100345 IR THORACENTESIS

## 2025-01-16 PROCEDURE — C1729 CATH, DRAINAGE: HCPCS

## 2025-01-16 PROCEDURE — 25000000 HC PHARMACY GENERAL: Performed by: PHYSICIAN ASSISTANT

## 2025-01-16 RX ORDER — LIDOCAINE HYDROCHLORIDE 10 MG/ML
INJECTION, SOLUTION INFILTRATION; PERINEURAL
Status: COMPLETED | OUTPATIENT
Start: 2025-01-16 | End: 2025-01-16

## 2025-01-16 RX ADMIN — LIDOCAINE HYDROCHLORIDE 10 ML: 10 INJECTION, SOLUTION INFILTRATION; PERINEURAL at 10:13

## 2025-01-16 NOTE — H&P
Interventional Radiology Abbreviated Preprocedure H&P    History: Patient is a 89 y.o. male who presents for repeat therapeutic L thoracentesis. +intermittent sob. No f/c.    Physical Exam: Awake, alert, no acute distress     Labs: None relevant     Imaging: Reviewed    Assessment/Plan: Procedure/risks discussed. Consent obtained.  Plan to proceed with L thoracentesis today.

## 2025-01-16 NOTE — DISCHARGE INSTRUCTIONS
THORACENTESIS    DISCHARGE INSTRUCTIONS  You have had a thoracentesis, or the fluid removed from around your lung.  You may resume your normal diet.  You may resume your normal medications.   Do not drive, engage in heavy lifting or strenuous activity or drink any alcoholic beverages for the next 24 hours.  You may resume normal activity in 24 hours, as tolerated.  Keep the area covered and dry for the next 24 hours. Do not submerge the area in water (i.e. tub baths, hot tubs or swimming pools for 5 days). You may shower.   It is normal to experience some pain at the site over the next few days. You may take Tylenol for that pain.  Notify your primary physician and/or Interventional Radiologist IMMEDIATELY if any of the following occur:  · Fever of 101° F (38 ° C) or chills  · Swelling and or redness in the area of the puncture site  · Worsening pain  · Lightheadedness or dizziness upon standing  - Worsening shortness of breath  - Sudden severe chest pain.  Physician Contact Information  If you have a problem that you believe requires immediate attention, you should go to the Emergency Room, either at [facility] or the closest hospital. If you believe that your problem can safely wait until you speak to a physician, you should contact your doctor or Interventional Radiologist.   It is usually easier to contact your own physician by calling the office, or after hours through the answering service. If you do not know the number, the hospital  can connect you by calling [phone].   If you have concerns that need to be answered by the Interventional Radiologist, you can reach him/ her as follows:   During regular weekday hours (8AM to 5PM), call [phone] and ask the technologist to connect you with the Interventional Radiologist.   During off hours for emergencies call [phone] and ask the hospital  to contact the Interventional Radiologist on-call.    [facility] strongly recommends that you visit a Primary Care Provider (PCP) regularly. Your PCP can help you implement the recommendations we gave you today, coordinate care among your specialists, as well as make sure you are up to date with wellness exams, immunizations and preventive screenings. Your PCP can also help when you are feeling sick, potentially avoiding the need for urgent care or emergency department visits. For these reasons, it is important that you follow up with your PCP at least annually or more often based upon your medical conditions. If you do not have a PCP, please call [phone] ([phone]) or go to Find a Doctor for help with finding one.

## 2025-01-16 NOTE — POST-PROCEDURE NOTE
Interventional Radiology Brief Postprocedure Note    Samy Elena     Attending: SOPHIA Peraza / Goyo Burnham MD    Assistant: none    Diagnosis: SOB    Description of procedure: US guided L thora    Contrast: none     Anesthesia:  Local (Lidocaine)    Volume of Lidocaine Utilized (ml): 10     Medications: none     Complications: None      Estimated Blood Loss: Estimated Blood Loss: 0-10 ml    Anticoagulation: n/a    Specimens: 800mL serosanguinous pleural fluid    Findings: Successful US guided L thora with 800mL pleural fluid removed and discarded. Pt luisa well. Vss. Cxr pending.     1/16/2025 10:38 AM

## 2025-01-16 NOTE — OR SURGEON
Pre-Procedure patient identification:  I am the primary operating surgeon/proceduralist and I have reviewed the applicable pathology reports and radiology studies for this procedure. I have identified the patient on 01/16/25 at 10:02 AM SOPHIA Peraza

## 2025-02-04 ENCOUNTER — HOSPITAL ENCOUNTER (INPATIENT)
Facility: HOSPITAL | Age: 89
LOS: 4 days | Discharge: HOME HEALTH | DRG: 378 | End: 2025-02-08
Attending: EMERGENCY MEDICINE | Admitting: INTERNAL MEDICINE
Payer: MEDICARE

## 2025-02-04 DIAGNOSIS — K62.5 RECTAL BLEEDING: Primary | ICD-10-CM

## 2025-02-04 PROBLEM — D64.9 ACUTE ON CHRONIC ANEMIA: Status: ACTIVE | Noted: 2025-02-04

## 2025-02-04 PROBLEM — I50.20 HFREF (HEART FAILURE WITH REDUCED EJECTION FRACTION) (CMS/HCC): Status: ACTIVE | Noted: 2025-02-04

## 2025-02-04 PROBLEM — Z86.59 HISTORY OF ANXIETY: Status: ACTIVE | Noted: 2025-02-04

## 2025-02-04 PROBLEM — I25.10 CAD (CORONARY ARTERY DISEASE): Status: ACTIVE | Noted: 2025-02-04

## 2025-02-04 PROBLEM — D69.6 THROMBOCYTOPENIA (CMS/HCC): Status: ACTIVE | Noted: 2025-02-04

## 2025-02-04 PROBLEM — Z87.448 HX OF CHRONIC KIDNEY DISEASE: Status: ACTIVE | Noted: 2025-02-04

## 2025-02-04 PROBLEM — I48.91 ATRIAL FIBRILLATION (CMS/HCC): Status: ACTIVE | Noted: 2025-02-04

## 2025-02-04 LAB
ABO + RH BLD: NORMAL
ALBUMIN SERPL-MCNC: 2.4 G/DL (ref 3.5–5.7)
ALP SERPL-CCNC: 64 IU/L (ref 34–125)
ALT SERPL-CCNC: 4 IU/L (ref 7–52)
ANION GAP SERPL CALC-SCNC: 9 MEQ/L (ref 3–15)
APTT PPP: 32 SEC (ref 23–35)
AST SERPL-CCNC: 24 IU/L (ref 13–39)
BASOPHILS # BLD: 0.09 K/UL (ref 0.01–0.1)
BASOPHILS NFR BLD: 2 %
BILIRUB SERPL-MCNC: 0.7 MG/DL (ref 0.3–1.2)
BLD GP AB SCN SERPL QL: NEGATIVE
BUN SERPL-MCNC: 27 MG/DL (ref 7–25)
BURR CELLS BLD QL SMEAR: ABNORMAL
CALCIUM SERPL-MCNC: 7.3 MG/DL (ref 8.6–10.3)
CHLORIDE SERPL-SCNC: 109 MEQ/L (ref 98–107)
CO2 SERPL-SCNC: 23 MEQ/L (ref 21–31)
CREAT SERPL-MCNC: 1.5 MG/DL (ref 0.7–1.3)
D AG BLD QL: POSITIVE
DACRYOCYTES BLD QL SMEAR: ABNORMAL
DIFFERENTIAL METHOD BLD: ABNORMAL
EGFRCR SERPLBLD CKD-EPI 2021: 43.1 ML/MIN/1.73M*2
EOSINOPHIL # BLD: 0.09 K/UL (ref 0.04–0.54)
EOSINOPHIL NFR BLD: 2 %
ERYTHROCYTE [DISTWIDTH] IN BLOOD BY AUTOMATED COUNT: 18.7 % (ref 11.6–14.4)
ERYTHROCYTE [DISTWIDTH] IN BLOOD BY AUTOMATED COUNT: 23.3 % (ref 11.6–14.4)
GLUCOSE BLD-MCNC: 172 MG/DL (ref 70–99)
GLUCOSE SERPL-MCNC: 158 MG/DL (ref 70–99)
HCT VFR BLD AUTO: 19.5 % (ref 40.1–51)
HCT VFR BLD AUTO: 19.6 % (ref 40.1–51)
HGB BLD-MCNC: 6 G/DL (ref 13.7–17.5)
HGB BLD-MCNC: 6.3 G/DL (ref 13.7–17.5)
HYPOCHROMIA BLD QL SMEAR: ABNORMAL
INR PPP: 1.7
LABORATORY COMMENT REPORT: NORMAL
LIPASE SERPL-CCNC: 11 U/L (ref 11–82)
LYMPHOCYTES # BLD: 0.18 K/UL (ref 1.2–3.5)
LYMPHOCYTES NFR BLD: 4 %
MACROCYTES BLD QL SMEAR: ABNORMAL
MCH RBC QN AUTO: 31 PG (ref 28–33.2)
MCH RBC QN AUTO: 33 PG (ref 28–33.2)
MCHC RBC AUTO-ENTMCNC: 30.6 G/DL (ref 32.2–36.5)
MCHC RBC AUTO-ENTMCNC: 32.3 G/DL (ref 32.2–36.5)
MCV RBC AUTO: 107.7 FL (ref 83–98)
MCV RBC AUTO: 96.1 FL (ref 83–98)
MONOCYTES # BLD: 0.09 K/UL (ref 0.3–1)
MONOCYTES NFR BLD: 2 %
NEUTS BAND # BLD: 0.18 K/UL (ref 0–0.53)
NEUTS BAND # BLD: 3.96 K/UL (ref 1.7–7)
NEUTS BAND NFR BLD: 4 %
NEUTS SEG NFR BLD: 86 %
OVALOCYTES BLD QL SMEAR: ABNORMAL
PLATELET # BLD AUTO: 106 K/UL (ref 150–350)
PLATELET # BLD AUTO: 77 K/UL (ref 150–350)
PLATELET # BLD EST: ABNORMAL 10*3/UL
PMV BLD AUTO: 10.6 FL (ref 9.4–12.4)
PMV BLD AUTO: 9.9 FL (ref 9.4–12.4)
POCT TEST: ABNORMAL
POLYCHROMASIA BLD QL SMEAR: ABNORMAL
POTASSIUM SERPL-SCNC: 4.3 MEQ/L (ref 3.5–5.1)
PROT SERPL-MCNC: 5.9 G/DL (ref 6–8.2)
PROTHROMBIN TIME: 20.2 SEC (ref 12.2–14.5)
RBC # BLD AUTO: 1.82 M/UL (ref 4.5–5.8)
RBC # BLD AUTO: 2.03 M/UL (ref 4.5–5.8)
SCHISTOCYTES BLD QL SMEAR: ABNORMAL
SODIUM SERPL-SCNC: 141 MEQ/L (ref 136–145)
SPECIMEN EXP DATE BLD: NORMAL
VIT B12 SERPL-MCNC: 637 PG/ML (ref 180–914)
WBC # BLD AUTO: 4.38 K/UL (ref 3.8–10.5)
WBC # BLD AUTO: 4.61 K/UL (ref 3.8–10.5)

## 2025-02-04 PROCEDURE — 99223 1ST HOSP IP/OBS HIGH 75: CPT | Performed by: STUDENT IN AN ORGANIZED HEALTH CARE EDUCATION/TRAINING PROGRAM

## 2025-02-04 PROCEDURE — 85025 COMPLETE CBC W/AUTO DIFF WBC: CPT | Performed by: EMERGENCY MEDICINE

## 2025-02-04 PROCEDURE — 63600000 HC DRUGS/DETAIL CODE: Mod: JZ

## 2025-02-04 PROCEDURE — 86923 COMPATIBILITY TEST ELECTRIC: CPT

## 2025-02-04 PROCEDURE — 85610 PROTHROMBIN TIME: CPT | Performed by: EMERGENCY MEDICINE

## 2025-02-04 PROCEDURE — 12000000 HC ROOM AND CARE MED/SURG

## 2025-02-04 PROCEDURE — 63700000 HC SELF-ADMINISTRABLE DRUG

## 2025-02-04 PROCEDURE — 36415 COLL VENOUS BLD VENIPUNCTURE: CPT | Performed by: EMERGENCY MEDICINE

## 2025-02-04 PROCEDURE — 93005 ELECTROCARDIOGRAM TRACING: CPT | Performed by: EMERGENCY MEDICINE

## 2025-02-04 PROCEDURE — 86900 BLOOD TYPING SEROLOGIC ABO: CPT

## 2025-02-04 PROCEDURE — 85730 THROMBOPLASTIN TIME PARTIAL: CPT | Performed by: EMERGENCY MEDICINE

## 2025-02-04 PROCEDURE — 93005 ELECTROCARDIOGRAM TRACING: CPT

## 2025-02-04 PROCEDURE — 99285 EMERGENCY DEPT VISIT HI MDM: CPT | Mod: 25

## 2025-02-04 PROCEDURE — 82607 VITAMIN B-12: CPT

## 2025-02-04 PROCEDURE — 1123F ACP DISCUSS/DSCN MKR DOCD: CPT | Performed by: STUDENT IN AN ORGANIZED HEALTH CARE EDUCATION/TRAINING PROGRAM

## 2025-02-04 PROCEDURE — 85027 COMPLETE CBC AUTOMATED: CPT

## 2025-02-04 PROCEDURE — 83690 ASSAY OF LIPASE: CPT | Performed by: EMERGENCY MEDICINE

## 2025-02-04 PROCEDURE — 86901 BLOOD TYPING SEROLOGIC RH(D): CPT

## 2025-02-04 PROCEDURE — 87040 BLOOD CULTURE FOR BACTERIA: CPT

## 2025-02-04 PROCEDURE — 80053 COMPREHEN METABOLIC PANEL: CPT | Performed by: EMERGENCY MEDICINE

## 2025-02-04 RX ORDER — VIT C/E/ZN/COPPR/LUTEIN/ZEAXAN 250MG-90MG
500 CAPSULE ORAL EVERY MORNING
Status: DISCONTINUED | OUTPATIENT
Start: 2025-02-04 | End: 2025-02-08 | Stop reason: HOSPADM

## 2025-02-04 RX ORDER — PANTOPRAZOLE SODIUM 40 MG/10ML
40 INJECTION, POWDER, LYOPHILIZED, FOR SOLUTION INTRAVENOUS EVERY 12 HOURS
Status: DISCONTINUED | OUTPATIENT
Start: 2025-02-04 | End: 2025-02-08 | Stop reason: HOSPADM

## 2025-02-04 RX ORDER — SODIUM CHLORIDE 9 MG/ML
5 INJECTION, SOLUTION INTRAVENOUS AS NEEDED
Status: ACTIVE | OUTPATIENT
Start: 2025-02-04 | End: 2025-02-05

## 2025-02-04 RX ORDER — ESCITALOPRAM OXALATE 10 MG/1
10 TABLET ORAL EVERY MORNING
Status: DISCONTINUED | OUTPATIENT
Start: 2025-02-04 | End: 2025-02-08 | Stop reason: HOSPADM

## 2025-02-04 RX ORDER — THIAMINE HCL 100 MG
100 TABLET ORAL DAILY
Status: DISCONTINUED | OUTPATIENT
Start: 2025-02-04 | End: 2025-02-08 | Stop reason: HOSPADM

## 2025-02-04 RX ORDER — DEXTROSE 40 %
15-30 GEL (GRAM) ORAL AS NEEDED
Status: DISCONTINUED | OUTPATIENT
Start: 2025-02-04 | End: 2025-02-08 | Stop reason: HOSPADM

## 2025-02-04 RX ORDER — LATANOPROST 50 UG/ML
1 SOLUTION/ DROPS OPHTHALMIC NIGHTLY
Status: DISCONTINUED | OUTPATIENT
Start: 2025-02-04 | End: 2025-02-08 | Stop reason: HOSPADM

## 2025-02-04 RX ORDER — CARBOXYMETHYLCELLULOSE SODIUM 5 MG/ML
1 SOLUTION/ DROPS OPHTHALMIC
Status: DISCONTINUED | OUTPATIENT
Start: 2025-02-04 | End: 2025-02-06

## 2025-02-04 RX ORDER — MELATONIN 5 MG
10 CAPSULE ORAL NIGHTLY
Status: DISCONTINUED | OUTPATIENT
Start: 2025-02-04 | End: 2025-02-08 | Stop reason: HOSPADM

## 2025-02-04 RX ORDER — FAMOTIDINE 10 MG/1
10 TABLET ORAL DAILY
Status: DISCONTINUED | OUTPATIENT
Start: 2025-02-04 | End: 2025-02-08 | Stop reason: HOSPADM

## 2025-02-04 RX ORDER — LEVOTHYROXINE SODIUM 100 UG/1
100 TABLET ORAL
Status: DISCONTINUED | OUTPATIENT
Start: 2025-02-05 | End: 2025-02-08 | Stop reason: HOSPADM

## 2025-02-04 RX ORDER — DEXTROSE 50 % IN WATER (D50W) INTRAVENOUS SYRINGE
25 AS NEEDED
Status: DISCONTINUED | OUTPATIENT
Start: 2025-02-04 | End: 2025-02-08 | Stop reason: HOSPADM

## 2025-02-04 RX ORDER — FAMOTIDINE 10 MG/1
10 TABLET ORAL DAILY
COMMUNITY

## 2025-02-04 RX ORDER — IBUPROFEN 200 MG
16-32 TABLET ORAL AS NEEDED
Status: DISCONTINUED | OUTPATIENT
Start: 2025-02-04 | End: 2025-02-08 | Stop reason: HOSPADM

## 2025-02-04 RX ORDER — FINASTERIDE 5 MG/1
5 TABLET, FILM COATED ORAL DAILY
Status: DISCONTINUED | OUTPATIENT
Start: 2025-02-04 | End: 2025-02-08 | Stop reason: HOSPADM

## 2025-02-04 RX ORDER — TORSEMIDE 10 MG/1
10 TABLET ORAL DAILY
Status: DISCONTINUED | OUTPATIENT
Start: 2025-02-04 | End: 2025-02-08 | Stop reason: HOSPADM

## 2025-02-04 RX ORDER — PANTOPRAZOLE SODIUM 20 MG/1
20 TABLET, DELAYED RELEASE ORAL DAILY
Status: DISCONTINUED | OUTPATIENT
Start: 2025-02-04 | End: 2025-02-04

## 2025-02-04 RX ORDER — METHENAMINE HIPPURATE 1000 MG/1
1 TABLET ORAL
Status: DISCONTINUED | OUTPATIENT
Start: 2025-02-05 | End: 2025-02-08 | Stop reason: HOSPADM

## 2025-02-04 RX ORDER — TAMSULOSIN HYDROCHLORIDE 0.4 MG/1
0.4 CAPSULE ORAL EVERY MORNING
COMMUNITY

## 2025-02-04 RX ORDER — ASPIRIN 81 MG/1
81 TABLET ORAL DAILY
Status: DISCONTINUED | OUTPATIENT
Start: 2025-02-04 | End: 2025-02-08 | Stop reason: HOSPADM

## 2025-02-04 RX ORDER — FERROUS SULFATE 325(65) MG
325 TABLET ORAL
Status: DISCONTINUED | OUTPATIENT
Start: 2025-02-05 | End: 2025-02-08 | Stop reason: HOSPADM

## 2025-02-04 RX ORDER — FINASTERIDE 5 MG/1
5 TABLET, FILM COATED ORAL DAILY
COMMUNITY

## 2025-02-04 RX ORDER — CALCIUM CARBONATE 200(500)MG
200 TABLET,CHEWABLE ORAL DAILY
Status: DISCONTINUED | OUTPATIENT
Start: 2025-02-04 | End: 2025-02-08 | Stop reason: HOSPADM

## 2025-02-04 RX ORDER — SODIUM CHLORIDE 9 MG/ML
5 INJECTION, SOLUTION INTRAVENOUS AS NEEDED
Status: DISCONTINUED | OUTPATIENT
Start: 2025-02-04 | End: 2025-02-04

## 2025-02-04 RX ORDER — TAMSULOSIN HYDROCHLORIDE 0.4 MG/1
0.4 CAPSULE ORAL DAILY
Status: DISCONTINUED | OUTPATIENT
Start: 2025-02-04 | End: 2025-02-08 | Stop reason: HOSPADM

## 2025-02-04 RX ADMIN — FINASTERIDE 5 MG: 5 TABLET, FILM COATED ORAL at 20:59

## 2025-02-04 RX ADMIN — CYANOCOBALAMIN TAB 500 MCG 500 MCG: 500 TAB at 21:19

## 2025-02-04 RX ADMIN — LATANOPROST 1 DROP: 50 SOLUTION OPHTHALMIC at 23:07

## 2025-02-04 RX ADMIN — Medication 1 DROP: at 20:59

## 2025-02-04 RX ADMIN — CALCIUM CARBONATE 200 MG OF ELEMENTAL CALCIUM: 500 TABLET, CHEWABLE ORAL at 21:20

## 2025-02-04 RX ADMIN — THIAMINE HCL TAB 100 MG 100 MG: 100 TAB at 20:59

## 2025-02-04 RX ADMIN — PANTOPRAZOLE SODIUM 40 MG: 40 INJECTION, POWDER, LYOPHILIZED, FOR SOLUTION INTRAVENOUS at 20:59

## 2025-02-04 ASSESSMENT — ENCOUNTER SYMPTOMS
FEVER: 0
PSYCHIATRIC NEGATIVE: 1
HEMATURIA: 0
ANAL BLEEDING: 1
NAUSEA: 0
COUGH: 0
BLOOD IN STOOL: 1
CHILLS: 1
LIGHT-HEADEDNESS: 1
DIZZINESS: 1
SHORTNESS OF BREATH: 1
PALPITATIONS: 0
WHEEZING: 0
HEADACHES: 0
WEAKNESS: 1
ABDOMINAL PAIN: 1
HEMATOLOGIC/LYMPHATIC NEGATIVE: 1

## 2025-02-04 NOTE — ASSESSMENT & PLAN NOTE
History of A-fib follows with Dr. Jeffrey  Maintained on metoprolol, previously on Xarelto, now discontinued  Anticoagulation has been complicated by recurrent GIB  [id number] score 5    - Hold any AC, BB in the setting of active GIB  - Patient has declined NISH closure procedure in the past

## 2025-02-04 NOTE — ASSESSMENT & PLAN NOTE
Follows with Dr. Jeffrey at [facility]  TTE 09/2024 EF 40 to 45%, abnormal septal motion due to RV volume overload, unable to assess diastolic function. 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

## 2025-02-04 NOTE — ED PROVIDER NOTES
Emergency Medicine Note  HPI   HISTORY OF PRESENT ILLNESS     Patient is a 89-year-old man with past medical history of A-fib on Xarelto, colostomy, hypertension, recurrent GI bleed, presenting with rectal bleeding.  He reports that he has a history of bleeding diverticuli that causes rectal bleeding which has been intermittent for the past 9 months.  He was told by his doctor that he did not need to do anything about this unless he had heavy bleeding. Last night, he noted rectal bleeding which is more significant than it normally is, has improved mildly today although he had 1 episode of heavier bleeding per rectum.          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)     Coronary artery disease     Disease of thyroid gland     Will catheter in place     6/25 not at present    GI (gastrointestinal bleed)     Hypertension     Myocardial infarction (CMS/HCC)     Orthostatic hypotension     TIA (transient ischemic attack)        Past Surgical History   Procedure Laterality Date    cataract extraction right eye with implant and limbal relaxing incision Right 7/18/2024    Performed by Hayder Moses MD at  OR [facility]    Cataract extraction w/ intraocular lens implant Left     Cataract extraction with LRI and anterior vitrectomy left eye Left 11/16/2023    Performed by Hayder Moses MD at  OR [facility]    Cholecystectomy      Colostomy      COLOSTOMY LAPAROSCOPIC N/A 8/3/2022    Performed by Mat Bryant MD at [facility] OR    Coronary artery bypass graft      DIAGNOSTIC FLEXIBLE SIGMOIDOSCOPY WITH COLLECTION OF SPECIMEN BY WASHING N/A 11/27/2024    Performed by David Holcomb MD at [facility] GI    DIAGNOSTIC FLEXIBLE SIGMOIDOSCOPY WITH COLLECTION OF SPECIMEN BY WASHING N/A 10/18/2024     Performed by Maine Lopez MD at [facility]    DIAGNOSTIC UPPER GASTROINTESTINAL ENDOSCOPY WITH COLLECTION OF SPECIMEN BY WASHING N/A 11/27/2024    Performed by David Holcomb MD at [facility]    FLEXIBLE SIGMOIDOSCOPY WITH BIOPSY N/A 10/23/2024    Performed by David Holcomb MD at [facility]    INCISION AND DRAINAGE WITH DEBRIDMENT OF BUTTOCK, AND PERINEUM N/A 8/2/2022    Performed by Mat Bryant MD at [facility] OR    Joint replacement Left     Knee    RIGHT HEART CATH N/A 8/4/2022    Performed by Cristal Lacey MD at [facility] CARDIAC CATH/EP    Thyroidectomy      WASHOUT OF PERINEAL WOUND, COLONIC LAVAGE N/A 8/3/2022    Performed by Mat Bryant MD at [facility] OR       No family history on file.    Social History     Tobacco Use    Smoking status: Never    Smokeless tobacco: Never   Vaping Use    Vaping status: Never Used   Substance Use Topics    Alcohol use: Not Currently     Comment: social    Drug use: Never         Review of Systems   REVIEW OF SYSTEMS     Review of Systems      VITALS     ED Vitals      Date/Time Temp Pulse Resp BP SpO2 [facility]   02/04/25 1930 -- 84 20 84/53 95 % LCJ   02/04/25 1910 -- 88 20 80/50 94 % LCJ   02/04/25 1900 -- 91 21 79/52 95 % LCJ   02/04/25 1845 37 °C (98.6 °F) 85 20 88/53 95 % LCJ   02/04/25 1815 -- 87 20 81/51 99 % LCJ   02/04/25 1800 36.6 °C (97.8 °F) 83 18 79/51 95 % LCJ   02/04/25 1745 -- 83 20 84/54 96 % LCJ   02/04/25 1742 36.4 °C (97.6 °F) 80 20 88/50 95 % LCJ   02/04/25 1700 -- 84 20 82/52 95 % LCJ   02/04/25 1630 -- 86 20 90/53 93 % LCJ   02/04/25 1600 -- 86 20 90/53 96 % LCJ   02/04/25 1540 -- 84 18 83/51 98 % LCJ   02/04/25 1532 36.6 °C (97.8 °F) 81 20 94/55 97 % LCJ                         Physical Exam   PHYSICAL EXAM     Physical Exam  Vitals and nursing note reviewed.   Constitutional:       Appearance: He is well-developed.   HENT:      Head: Normocephalic and atraumatic.   Cardiovascular:      Rate and Rhythm: Normal rate and regular rhythm.   Pulmonary:       Effort: Pulmonary effort is normal. No respiratory distress.   Abdominal:      Palpations: Abdomen is soft.      Tenderness: There is no abdominal tenderness.   Musculoskeletal:      Cervical back: Neck supple.   Skin:     General: Skin is warm and dry.   Neurological:      General: No focal deficit present.      Mental Status: He is alert.   Psychiatric:         Mood and Affect: Mood normal.         Behavior: Behavior normal.           PROCEDURES     Procedures     DATA     Results       Procedure Component Value Units Date/Time    Prepare RBC-(BLOOD BANK ORDER for PRODUCT): 2 Units Transfusion indications: Acute Bleeding [[id number]] Collected: 02/04/25 1643     Updated: 02/04/25 1957     Product Code D3063Q89     Unit ID [id number]     Unit ABO O     Unit RH Positive     Crossmatch Compatible     Product Status IS     Unit Expiration Date/Time 156190492519     ISBT Code 5100     Product Code X0746O23     Unit ID [id number]     Unit ABO O     Unit RH Positive     Crossmatch Compatible     Product Status IS     Unit Expiration Date/Time 202503052359     ISBT Code 5100    Type and screen [[id number]] Collected: 02/04/25 1535    Specimen: Blood, Venous Updated: 02/04/25 1703     Specimen Expiration 02/07/2025     Antibody Screen Negative     ABO O     Rh Factor Positive     History Check Previous type on file    Protime-INR [[id number]]  (Abnormal) Collected: 02/04/25 1557    Specimen: Blood, Venous Updated: 02/04/25 1646     PT 20.2 sec      INR 1.7     Comment: Moderate Intensity Anticoagulation = 2.0 to 3.0, High Intensity = 2.5 to 3.5       PTT [[id number]]  (Normal) Collected: 02/04/25 1557    Specimen: Blood, Venous Updated: 02/04/25 1646     PTT 32 sec     CBC and differential [[id number]]  (Abnormal) Collected: 02/04/25 1535    Specimen: Blood, Venous Updated: 02/04/25 1646     WBC 4.61 K/uL      RBC 1.82 M/uL      Hemoglobin 6.0 g/dL      Comment: This result has been called to Yarely Chu,  RN by Nickie on 02/04/2025 16:04:48, and has been read back.         Hematocrit 19.6 %      .7 fL      MCH 33.0 pg      MCHC 30.6 g/dL      RDW 18.7 %      Platelets 106 K/uL      MPV 10.6 fL      Differential Type Manu     Neutrophils 86 %      Lymphocytes 4 %      Monocytes 2 %      Eosinophils 2 %      Basophils 2 %      Bands 4 %      Neutrophils, Absolute 3.96 K/uL      Lymphocytes, Absolute 0.18 K/uL      Monocytes, Absolute 0.09 K/uL      Eosinophils, Absolute 0.09 K/uL      Basophils, Absolute 0.09 K/uL      Bands, Absolute 0.18 K/uL      Platelet Estimate Decreased (51,000-149,000)     Hypochromia 1+     Macrocytes 1+     Ovalocytes Occasional     Polychromasia Occasional     Schistocytes Occasional     Teardrop Cells Occasional     [place] Cells Occasional    Comprehensive metabolic panel [[id number]]  (Abnormal) Collected: 02/04/25 1535    Specimen: Blood, Venous Updated: 02/04/25 1628     Sodium 141 mEQ/L      Potassium 4.3 mEQ/L      Comment: Results obtained on plasma. Plasma Potassium values may be up to 0.4 mEQ/L less than serum values. The differences may be greater for patients with high platelet or white cell counts.        Chloride 109 mEQ/L      CO2 23 mEQ/L      BUN 27 mg/dL      Creatinine 1.5 mg/dL      Glucose 158 mg/dL      Calcium 7.3 mg/dL      AST (SGOT) 24 IU/L      ALT (SGPT) 4 IU/L      Alkaline Phosphatase 64 IU/L      Total Protein 5.9 g/dL      Comment: Test performed on plasma which typically contains approximately 0.4 g/dL more protein than serum.        Albumin 2.4 g/dL      Bilirubin, Total 0.7 mg/dL      eGFR 43.1 mL/min/1.73m*2      Comment: Calculation based on the Chronic Kidney Disease Epidemiology Collaboration (CKD-EPI) equation refit without adjustment for race.        Anion Gap 9 mEQ/L     Lipase, if pain is above umbilicus [[id number]]  (Normal) Collected: 02/04/25 1535    Specimen: Blood, Venous Updated: 02/04/25 1626     Lipase 11 U/L             Imaging Results     None         ECG 12 lead          Scoring tools                                  ED Course & MDM   MDM / ED COURSE / CLINICAL IMPRESSION / DISPO     Medical Decision Making  Patient is hypotensive however he is awake and alert, review of records indicates that he often has soft blood pressure when presenting with GI bleed.  Given his history of volume overload, no IV fluids given, transfusing at this time and will admit to medicine for continued management.    Amount and/or Complexity of Data Reviewed  Labs: ordered. Decision-making details documented in ED Course.  ECG/medicine tests: ordered.    Risk  Prescription drug management.  Decision regarding hospitalization.        ED Course as of 02/04/25 2003 Tue Feb 04, 2025 1829 Hemoglobin(!!): 6.0 [JR]      ED Course User Index  [JR] Ailyn Vazquez MD     Clinical Impression      Rectal bleeding     _________________       ED Disposition   Admit / Observation                       Ailyn Vazquez MD  02/04/25 2003

## 2025-02-04 NOTE — ASSESSMENT & PLAN NOTE
History of CABG in 1996  No recent stress test or cath available on file  Maintained on ASA, does not appear to be on statin    - Continue ASA

## 2025-02-05 ENCOUNTER — APPOINTMENT (INPATIENT)
Dept: RADIOLOGY | Facility: HOSPITAL | Age: 89
DRG: 378 | End: 2025-02-05
Payer: MEDICARE

## 2025-02-05 ENCOUNTER — APPOINTMENT (INPATIENT)
Dept: RADIOLOGY | Facility: HOSPITAL | Age: 89
DRG: 378 | End: 2025-02-05
Attending: PHYSICIAN ASSISTANT
Payer: MEDICARE

## 2025-02-05 LAB
ANION GAP SERPL CALC-SCNC: 3 MEQ/L (ref 3–15)
BACTERIA URNS QL MICRO: ABNORMAL /HPF
BILIRUB UR QL STRIP.AUTO: NEGATIVE MG/DL
BLOOD BANK CMNT PATIENT-IMP: NORMAL
BNP SERPL-MCNC: 280 PG/ML
BUN SERPL-MCNC: 25 MG/DL (ref 7–25)
CA-I BLD-SCNC: 1.15 MMOL/L (ref 1.15–1.27)
CALCIUM SERPL-MCNC: 7.2 MG/DL (ref 8.6–10.3)
CHLORIDE SERPL-SCNC: 112 MEQ/L (ref 98–107)
CLARITY UR REFRACT.AUTO: CLEAR
CO2 SERPL-SCNC: 26 MEQ/L (ref 21–31)
COLOR UR AUTO: YELLOW
CREAT SERPL-MCNC: 1.4 MG/DL (ref 0.7–1.3)
EGFRCR SERPLBLD CKD-EPI 2021: 46.9 ML/MIN/1.73M*2
ERYTHROCYTE [DISTWIDTH] IN BLOOD BY AUTOMATED COUNT: 23.1 % (ref 11.6–14.4)
ERYTHROCYTE [DISTWIDTH] IN BLOOD BY AUTOMATED COUNT: 23.3 % (ref 11.6–14.4)
ERYTHROCYTE [DISTWIDTH] IN BLOOD BY AUTOMATED COUNT: 23.6 % (ref 11.6–14.4)
ERYTHROCYTE [DISTWIDTH] IN BLOOD BY AUTOMATED COUNT: 23.9 % (ref 11.6–14.4)
FERRITIN SERPL-MCNC: 38 NG/ML (ref 24–250)
GLUCOSE SERPL-MCNC: 100 MG/DL (ref 70–99)
GLUCOSE UR STRIP.AUTO-MCNC: NEGATIVE MG/DL
HAPTOGLOB SERPL-MCNC: 85 MG/DL (ref 41–203)
HCT VFR BLD AUTO: 21.9 % (ref 40.1–51)
HCT VFR BLD AUTO: 22.3 % (ref 40.1–51)
HCT VFR BLD AUTO: 22.5 % (ref 40.1–51)
HCT VFR BLD AUTO: 22.8 % (ref 40.1–51)
HGB BLD-MCNC: 7 G/DL (ref 13.7–17.5)
HGB BLD-MCNC: 7.2 G/DL (ref 13.7–17.5)
HGB BLD-MCNC: 7.2 G/DL (ref 13.7–17.5)
HGB BLD-MCNC: 7.3 G/DL (ref 13.7–17.5)
HGB UR QL STRIP.AUTO: NEGATIVE
HYALINE CASTS #/AREA URNS LPF: ABNORMAL /LPF
IRON SATN MFR SERPL: 89 % (ref 15–45)
IRON SERPL-MCNC: 222 UG/DL (ref 35–150)
KETONES UR STRIP.AUTO-MCNC: NEGATIVE MG/DL
LACTATE SERPL-SCNC: 2.3 MMOL/L (ref 0.4–2)
LDH SERPL L TO P-CCNC: 115 IU/L (ref 98–271)
LEUKOCYTE ESTERASE UR QL STRIP.AUTO: 2
MAGNESIUM SERPL-MCNC: 1.9 MG/DL (ref 1.8–2.5)
MCH RBC QN AUTO: 30.5 PG (ref 28–33.2)
MCH RBC QN AUTO: 30.6 PG (ref 28–33.2)
MCH RBC QN AUTO: 30.8 PG (ref 28–33.2)
MCH RBC QN AUTO: 31.2 PG (ref 28–33.2)
MCHC RBC AUTO-ENTMCNC: 32 G/DL (ref 32.2–36.5)
MCHC RBC AUTO-ENTMCNC: 32.3 G/DL (ref 32.2–36.5)
MCV RBC AUTO: 94.5 FL (ref 83–98)
MCV RBC AUTO: 95.7 FL (ref 83–98)
MCV RBC AUTO: 96.5 FL (ref 83–98)
MCV RBC AUTO: 97.4 FL (ref 83–98)
MUCOUS THREADS URNS QL MICRO: ABNORMAL /LPF
NITRITE UR QL STRIP.AUTO: NEGATIVE
PH UR STRIP.AUTO: 6.5 [PH]
PLATELET # BLD AUTO: 77 K/UL (ref 150–350)
PLATELET # BLD AUTO: 80 K/UL (ref 150–350)
PLATELET # BLD AUTO: 82 K/UL (ref 150–350)
PLATELET # BLD AUTO: 83 K/UL (ref 150–350)
PMV BLD AUTO: 10.1 FL (ref 9.4–12.4)
PMV BLD AUTO: 10.3 FL (ref 9.4–12.4)
PMV BLD AUTO: 10.4 FL (ref 9.4–12.4)
PMV BLD AUTO: 10.8 FL (ref 9.4–12.4)
POTASSIUM SERPL-SCNC: 3.9 MEQ/L (ref 3.5–5.1)
PROT UR QL STRIP.AUTO: NEGATIVE
QRS DURATION: 166
QT INTERVAL: 464
QTC CALCULATION(BAZETT): 539
R AXIS: 67
RBC # BLD AUTO: 2.27 M/UL (ref 4.5–5.8)
RBC # BLD AUTO: 2.34 M/UL (ref 4.5–5.8)
RBC # BLD AUTO: 2.35 M/UL (ref 4.5–5.8)
RBC # BLD AUTO: 2.36 M/UL (ref 4.5–5.8)
RBC #/AREA URNS HPF: ABNORMAL /HPF
RETICS #: 0.06 M/UL (ref 0–0.12)
RETICS/RBC NFR: 2.55 % (ref 0.6–2.8)
SODIUM SERPL-SCNC: 141 MEQ/L (ref 136–145)
SP GR UR REFRACT.AUTO: 1.01
SQUAMOUS URNS QL MICRO: ABNORMAL /HPF
T WAVE AXIS: 4
TIBC SERPL-MCNC: 249 UG/DL (ref 270–460)
UIBC SERPL-MCNC: 27 UG/DL (ref 180–360)
UROBILINOGEN UR STRIP-ACNC: 0.2 EU/DL
VENTRICULAR RATE: 81
WBC # BLD AUTO: 4.11 K/UL (ref 3.8–10.5)
WBC # BLD AUTO: 4.46 K/UL (ref 3.8–10.5)
WBC # BLD AUTO: 4.83 K/UL (ref 3.8–10.5)
WBC # BLD AUTO: 5.38 K/UL (ref 3.8–10.5)
WBC #/AREA URNS HPF: ABNORMAL /HPF

## 2025-02-05 PROCEDURE — 21400000 HC ROOM AND CARE CCU/INTERMEDIATE

## 2025-02-05 PROCEDURE — 83605 ASSAY OF LACTIC ACID: CPT

## 2025-02-05 PROCEDURE — 83010 ASSAY OF HAPTOGLOBIN QUANT: CPT

## 2025-02-05 PROCEDURE — 63700000 HC SELF-ADMINISTRABLE DRUG

## 2025-02-05 PROCEDURE — 36415 COLL VENOUS BLD VENIPUNCTURE: CPT

## 2025-02-05 PROCEDURE — 36430 TRANSFUSION BLD/BLD COMPNT: CPT

## 2025-02-05 PROCEDURE — 71045 X-RAY EXAM CHEST 1 VIEW: CPT

## 2025-02-05 PROCEDURE — 85027 COMPLETE CBC AUTOMATED: CPT

## 2025-02-05 PROCEDURE — 99233 SBSQ HOSP IP/OBS HIGH 50: CPT | Performed by: STUDENT IN AN ORGANIZED HEALTH CARE EDUCATION/TRAINING PROGRAM

## 2025-02-05 PROCEDURE — 99223 1ST HOSP IP/OBS HIGH 75: CPT | Performed by: HOSPITALIST

## 2025-02-05 PROCEDURE — 81003 URINALYSIS AUTO W/O SCOPE: CPT

## 2025-02-05 PROCEDURE — 83540 ASSAY OF IRON: CPT

## 2025-02-05 PROCEDURE — P9016 RBC LEUKOCYTES REDUCED: HCPCS

## 2025-02-05 PROCEDURE — 85045 AUTOMATED RETICULOCYTE COUNT: CPT

## 2025-02-05 PROCEDURE — 82330 ASSAY OF CALCIUM: CPT

## 2025-02-05 PROCEDURE — 93010 ELECTROCARDIOGRAM REPORT: CPT | Performed by: INTERNAL MEDICINE

## 2025-02-05 PROCEDURE — 83615 LACTATE (LD) (LDH) ENZYME: CPT

## 2025-02-05 PROCEDURE — 63600000 HC DRUGS/DETAIL CODE: Mod: JZ

## 2025-02-05 PROCEDURE — 36100330 IR THORACENTESIS

## 2025-02-05 PROCEDURE — 25800000 HC PHARMACY IV SOLUTIONS

## 2025-02-05 PROCEDURE — 0W9B3ZZ DRAINAGE OF LEFT PLEURAL CAVITY, PERCUTANEOUS APPROACH: ICD-10-PCS | Performed by: RADIOLOGY

## 2025-02-05 PROCEDURE — 82728 ASSAY OF FERRITIN: CPT

## 2025-02-05 PROCEDURE — 1123F ACP DISCUSS/DSCN MKR DOCD: CPT | Performed by: HOSPITALIST

## 2025-02-05 PROCEDURE — C1729 CATH, DRAINAGE: HCPCS

## 2025-02-05 PROCEDURE — 83735 ASSAY OF MAGNESIUM: CPT

## 2025-02-05 PROCEDURE — 80048 BASIC METABOLIC PNL TOTAL CA: CPT

## 2025-02-05 PROCEDURE — 83880 ASSAY OF NATRIURETIC PEPTIDE: CPT

## 2025-02-05 RX ORDER — SODIUM CHLORIDE 9 MG/ML
5 INJECTION, SOLUTION INTRAVENOUS AS NEEDED
Status: ACTIVE | OUTPATIENT
Start: 2025-02-05 | End: 2025-02-06

## 2025-02-05 RX ORDER — FUROSEMIDE 10 MG/ML
20 INJECTION INTRAMUSCULAR; INTRAVENOUS ONCE
Status: COMPLETED | OUTPATIENT
Start: 2025-02-05 | End: 2025-02-05

## 2025-02-05 RX ADMIN — CALCIUM CARBONATE 200 MG OF ELEMENTAL CALCIUM: 500 TABLET, CHEWABLE ORAL at 10:51

## 2025-02-05 RX ADMIN — CYANOCOBALAMIN TAB 500 MCG 500 MCG: 500 TAB at 10:51

## 2025-02-05 RX ADMIN — LEVOTHYROXINE SODIUM 100 MCG: 0.1 TABLET ORAL at 09:27

## 2025-02-05 RX ADMIN — Medication 10 MG: at 21:38

## 2025-02-05 RX ADMIN — Medication 1 DROP: at 18:18

## 2025-02-05 RX ADMIN — SODIUM CHLORIDE 125 MG: 9 INJECTION, SOLUTION INTRAVENOUS at 10:32

## 2025-02-05 RX ADMIN — THIAMINE HCL TAB 100 MG 100 MG: 100 TAB at 09:27

## 2025-02-05 RX ADMIN — FINASTERIDE 5 MG: 5 TABLET, FILM COATED ORAL at 09:27

## 2025-02-05 RX ADMIN — Medication 1 DROP: at 00:21

## 2025-02-05 RX ADMIN — Medication 1 DROP: at 10:57

## 2025-02-05 RX ADMIN — Medication 1 DROP: at 21:38

## 2025-02-05 RX ADMIN — Medication 1 DROP: at 15:33

## 2025-02-05 RX ADMIN — METHENAMINE HIPPURATE 1 G: 1 TABLET ORAL at 10:51

## 2025-02-05 RX ADMIN — PANTOPRAZOLE SODIUM 40 MG: 40 INJECTION, POWDER, LYOPHILIZED, FOR SOLUTION INTRAVENOUS at 09:27

## 2025-02-05 RX ADMIN — PANTOPRAZOLE SODIUM 40 MG: 40 INJECTION, POWDER, LYOPHILIZED, FOR SOLUTION INTRAVENOUS at 21:38

## 2025-02-05 RX ADMIN — Medication 1 DROP: at 12:18

## 2025-02-05 RX ADMIN — FUROSEMIDE 20 MG: 10 INJECTION, SOLUTION INTRAMUSCULAR; INTRAVENOUS at 10:52

## 2025-02-05 RX ADMIN — LATANOPROST 1 DROP: 50 SOLUTION OPHTHALMIC at 22:21

## 2025-02-05 ASSESSMENT — COGNITIVE AND FUNCTIONAL STATUS - GENERAL
CLIMB 3 TO 5 STEPS WITH RAILING: 2 - A LOT
DRESSING REGULAR UPPER BODY CLOTHING: 3 - A LITTLE
MOVING TO AND FROM BED TO CHAIR: 2 - A LOT
WALKING IN HOSPITAL ROOM: 2 - A LOT
STANDING UP FROM CHAIR USING ARMS: 2 - A LOT
HELP NEEDED FOR BATHING: 2 - A LOT
STANDING UP FROM CHAIR USING ARMS: 2 - A LOT
TOILETING: 3 - A LITTLE
EATING MEALS: 3 - A LITTLE
HELP NEEDED FOR PERSONAL GROOMING: 3 - A LITTLE
WALKING IN HOSPITAL ROOM: 2 - A LOT
CLIMB 3 TO 5 STEPS WITH RAILING: 1 - TOTAL
MOVING TO AND FROM BED TO CHAIR: 2 - A LOT
DRESSING REGULAR LOWER BODY CLOTHING: 2 - A LOT

## 2025-02-05 NOTE — PLAN OF CARE
Plan of Care Review  Plan of Care Reviewed With: patient  Progress: no change  Outcome Evaluation: Pt. arrived to unit from ED, and oriented to room. Is Alert & Oriented x4.  AFIB on the monitor. 6pm CBC sent, HGB 7.2. Contact precautions initiated. Call bell within reach. FSP in place. Family at bedside.

## 2025-02-05 NOTE — ASSESSMENT & PLAN NOTE
History of chronic thrombocytopenia  Presenting with  from baseline 80s to 90s    -Trend on TID CBC

## 2025-02-05 NOTE — ASSESSMENT & PLAN NOTE
Extensive GI history notable for recurrent GIB s/p colostomy with rectal stump (transverse colon was divided)  S/p IR performed successful subselective SMA and MARIE arteriography 10/2024  Most recently admitted on 12/30 for stomal bleed felt to be diverticular in etiology vs stomal irritation managed with PRBC and no invasive interventions  Most recent scopes: Flex sig on 10/18 via stoma with intact stoma, diverticulosis, no active bleeding.  Flex sig on 10/23 via rectum with blood clots throughout rectum and sigmoid with many diverticula.   11/27 EGD unremarkable. 11/27 flex sig via stoma with moderate amount of brown stool, no fresh or residual blood, diverticulosis.  Presents today with rectal and stomal bleeding and acute hemoglobin drop  S/p 2 units in the ED    Rectal/stomal bleeding likely diverticular bleed secondary to known diverticulosis, which tend to self resolve. Lower concern for UGIB given recent scopes unremarkable; in addition ETT for EGD would be extremely difficult given history of torrential TR.    - Hold off on GI consult as repeat colonoscopy likely would not yield much benefit as diverticular bleeds typically self resolve.  - Consider GI consult if significant, large volume GIB with hemodynamic changes  - Obtain stat CTA for large volume bloody BM  - Support with PRBC for Hgb goal >8 given history of CAD  - Maintain 2 large-bore IVs  - CLD for now  - Palliative care consult

## 2025-02-05 NOTE — ASSESSMENT & PLAN NOTE
History of CABG in 1996  No recent stress test or cath available on file  Maintained on ASA, does not appear to be on statin    - Holding ASA for now

## 2025-02-05 NOTE — ASSESSMENT & PLAN NOTE
89 y.o. with a PMH of diverticular bleed presenting with GIB    - Code status: Do Not Resuscitate / Allow Natural Death  - Prognosis: guarded to grim  - Advance Directives: in place, not in chart  - Surrogate Decision Maker: names all 4 sons  - Capacity intact   - Spiritual & Existential Needs: none

## 2025-02-05 NOTE — OR SURGEON
Pre-Procedure patient identification:  I am the primary operating surgeon/proceduralist and I have reviewed the applicable pathology reports and radiology studies for this procedure. I have identified the patient on 02/05/25 at 8:32 AM SOPHIA Seals C

## 2025-02-05 NOTE — PROGRESS NOTES
[facility]: Patient currently in an open episode with [facility] prior to hospitalization.  [facility] will continue to follow during hospitalization for ongoing home care needs.YAHIR 2/5/25  Sandra Esparza -[phone]

## 2025-02-05 NOTE — ASSESSMENT & PLAN NOTE
History of recurrent pleural effusion  Most recent diagnostic Thora was done in 09/2024 with lights criteria consistent with transudative effusion, however of note Thora during 07/2024 admission was exudative  Now gets therapeutic thoracenteses most recently on 1/16 with removal of 800 mL serosanguineous fluid  Was scheduled for OP thoracentesis on 2/6 by IR    - Consult IR in the morning for Thora while inpatient

## 2025-02-05 NOTE — PROGRESS NOTES
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  Flex sig on 10/23 via rectum with blood clots throughout rectum and sigmoid with many diverticula.   11/27 EGD unremarkable. 11/27 flex sig via stoma with moderate amount of brown stool, no fresh or residual blood, diverticulosis.  Presents today with rectal and stomal bleeding and acute hemoglobin drop  S/p 2 units in the ED    Rectal/stomal bleeding likely diverticular bleed secondary to known diverticulosis, which tend to self resolve. Lower concern for UGIB given recent scopes unremarkable; in addition ETT for EGD would be extremely difficult given history of torrential TR.    - Hold off on GI consult as repeat colonoscopy likely would not yield much benefit as diverticular bleeds typically self resolve.  - Consider GI consult if significant, large volume GIB with hemodynamic changes  - Obtain stat CTA for large volume bloody BM  - Support with PRBC for Hgb goal >8 given history of CAD  - Maintain 2 large-bore IVs  - CLD for now  - Palliative care consult    Acute on chronic anemia  Present with significant rectal bleeding  BP 80s/50s, not tachycardic although he is maintained on a beta-blocker  Hgb 6.0 with , from baseline 8  Folate from 12/2024 wnl, no B12 on chart review. Iron studies from 12/24 within normal limits.  Maintained on iron supplements  Vit B12 (2/4) WNL    Blood loss anemia in the setting of active GIB    - Transfuse PRBC for goal Hgb >8 given history of CAD  - F/u  hemolysis labs, smear  - See remaining plan as per rectal bleed problem  HFrEF (heart failure with reduced ejection fraction) (CMS/[facility])  Follows with Dr. Jeffrey at [facility]  TTE 09/2024 EF 40 to 45%, abnormal septal motion due to RV volume overload, unable to assess diastolic function. 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  Dry weight roughly 164 LBS, presenting on RA and appears euvolemic  CHF has been complicated by pleural effusions, has considered Pleurx and pleurodesis in the past    - Hold diuretic beta-blocker in the setting of active GIB  - Resume as able  - Daily weights, strict I's/O  Recurrent pleural effusion  History of recurrent pleural effusion  Most recent diagnostic Thora was done in 09/2024 with lights criteria consistent with transudative effusion, however of note Thora during 07/2024 admission was exudative  Now gets therapeutic thoracenteses most recently on 1/16 with removal of 800 mL serosanguineous fluid  Was scheduled for OP thoracentesis on 2/6 by IR    - S/p IR 2/5 w/ 800 cc clear hilario fluid removed and large effusion remaining.   - F/u CXR s/p thoracentesis  Atrial fibrillation (CMS/[facility])  History of A-fib follows with Dr. Jeffrey  Maintained on metoprolol, previously on Xarelto, now discontinued  Anticoagulation has been complicated by recurrent GIB  [id number] score 5    - Hold any AC, BB in the setting of active GIB  - Patient has declined NISH closure procedure in the past  CAD (coronary artery disease)  History of CABG in 1996  No recent stress test or cath available on file  Maintained on ASA, does not appear to be on statin    - Holding ASA for now  History of anxiety  Maintained on escitalopram 10 mg daily  EKG with QTc 539    - Hold SSRI due to risk of prolonged Qtc and GI bleed  risk  Hx of chronic kidney disease  History of CKD  Baseline creatinine 1.3-1.5  Presenting with baseline renal function    - Monitor for CALI with hypovolemia 2/2 blood loss  - Trend on daily BMP  Thrombocytopenia (CMS/HCC)  History of chronic thrombocytopenia  Presenting with  from baseline 80s to 90s    -Trend on TID CBC    SUBJECTIVE   This is a 89 y.o. year-old male admitted on 2/4/2025 with Rectal bleeding [K62.5].    Case Summary:  89 yo M with h/o Atrial Fibrillation (not on AC), CAD s/p CABG in 1996 (on monotherapy ASA 81mg), Hx of recurrent GIB, Hx diverticulitis s/p colostomy, HFrEF 45-50%,, nonrheumatic TR, recurrent pleural effusions requiring L-sided thoracentesis, history of Justin's gangrene with SGLT2 use, HTN who presented on 2/4/25 with blood from his ostomy and rectum. Found to have hgb 6.0 on admission. S/p 2 unit pRBC in ED. During recent admissions for GI bleed, patient and family were told not candidate for further endoscopic or surgical interventions given recurrent events with high cardiac risk with anesthesia and likely unable to tolerate surgical interventions.    Interval History:   Patient newly admitted overnight. Patient received a total of 3 units pRBC with inappropriate response (6.0 > 6.3 > 7.2). This morning the patient reports feeling significantly improved compared to yesterday. No further bloody bowel movements     OBJECTIVE   Vital signs in last 24 hours:  Temp:  [36.4 °C (97.5 °F)-37 °C (98.6 °F)] 36.4 °C (97.5 °F)  Heart Rate:  [74-92] 83  Resp:  [15-46] 16  BP: ()/(46-63) 97/59  SpO2:  [93 %-99 %] 93 %  Oxygen Therapy: None (Room air)    Weight & I/Os:  Weights (last 5 days)       None            Intake/Output Summary (Last 24 hours) at 2/5/2025 0666  Last data filed at 2/5/2025 0242  24 Hour Net Input/Output from 7AM Yesterday   Intake 929 ml   Output 350 ml   Net 579 ml        PHYSICAL EXAMINATION   Physical Exam  Vitals reviewed.   Constitutional:        General: He is not in acute distress.     Appearance: He is ill-appearing.   HENT:      Head: Normocephalic and atraumatic.   Eyes:      General: No scleral icterus.  Cardiovascular:      Rate and Rhythm: Normal rate and regular rhythm.      Pulses: Normal pulses.      Heart sounds: Normal heart sounds.   Pulmonary:      Effort: Pulmonary effort is normal. No respiratory distress.   Abdominal:      General: Bowel sounds are normal. There is no distension.      Palpations: Abdomen is soft.      Tenderness: There is no abdominal tenderness. There is no guarding or rebound.      Comments: Ostomy w/ brown stool   Musculoskeletal:      Right lower leg: Edema present.      Left lower leg: Edema present.   Skin:     General: Skin is warm.      Coloration: Skin is pale.   Neurological:      Mental Status: He is alert and oriented to person, place, and time.   Psychiatric:         Mood and Affect: Mood normal.         Behavior: Behavior normal.          LINES, CATHETERS, DRAINS, AIRWAYS, & WOUNDS   Lines, drains, airways, & wounds:  Peripheral IV (Adult) 02/04/25 Anterior;Left Forearm (Active)   Number of days: 1       Peripheral IV (Adult) 02/04/25 Left Antecubital (Active)   Number of days: 1        LABS, IMAGING, & TELE   Labs:  See Below    Results from last 7 days   Lab Units 02/05/25  0625 02/04/25  2300 02/04/25  1535   WBC K/uL 4.11 4.38 4.61   HEMOGLOBIN g/dL 7.2* 6.3* 6.0*   HEMATOCRIT % 22.5* 19.5* 19.6*   PLATELETS K/uL 80* 77* 106*       Results from last 7 days   Lab Units 02/05/25  0625 02/04/25  1535   SODIUM mEQ/L 141 141   POTASSIUM mEQ/L 3.9 4.3   CHLORIDE mEQ/L 112* 109*   CO2 mEQ/L 26 23   BUN mg/dL 25 27*   CREATININE mg/dL 1.4* 1.5*   CALCIUM mg/dL 7.2* 7.3*   ALBUMIN g/dL  --  2.4*   BILIRUBIN TOTAL mg/dL  --  0.7   ALK PHOS IU/L  --  64   ALT IU/L  --  4*   AST IU/L  --  24   GLUCOSE mg/dL 100* 158*       Imaging personally reviewed (does not include unread studies):  No results  found.    Telemetry/EKG:  I have independently reviewed the telemetry. No events for the last 24 hours.     VTE Assessment: Padua    VTE Prophylaxis: Current anticoagulants:    None      Code Status: DNR (A.N.D.)  Estimated discharge date: 2/6/2025     ATTENDING DOCUMENTATION  ALSO SEE ATTENDING ATTESTATION SECTION OF NOTE

## 2025-02-05 NOTE — ASSESSMENT & PLAN NOTE
History of CKD  Baseline creatinine 1.3-1.5  Presenting with baseline renal function    - Monitor for CALI with hypovolemia 2/2 blood loss  - Trend on daily BMP

## 2025-02-05 NOTE — ASSESSMENT & PLAN NOTE
- Debility likely multifactorial in the setting of recurrent GIB, CHF, CAD.    - Living situation prior to hospitalization has 24 hour care at home.   - Baseline functional status is WC/bedbound.   - Current Palliative Performance Status: 30%  - Baseline Palliative Performance Status: 40%   - Frailty Yes  - Patient remains high risk for further deterioration and functional decline.    Plan:  - Set up home based PC NP's at home  - Palliative care will continue to follow for complex medical decision making

## 2025-02-05 NOTE — ASSESSMENT & PLAN NOTE
History of A-fib follows with Dr. Jeffrey  Maintained on metoprolol, previously on Xarelto, now discontinued  Anticoagulation has been complicated by recurrent GIB  [id number] score 5    - Hold any AC, BB in the setting of active GIB  - Patient has declined NISH closure procedure in the past

## 2025-02-05 NOTE — ASSESSMENT & PLAN NOTE
Present with significant rectal bleeding  BP 80s/50s, not tachycardic although he is maintained on a beta-blocker  Hgb 6.0 with , from baseline 8  Folate from 12/2024 wnl, no B12 on chart review. Iron studies from 12/24 within normal limits.  Maintained on iron supplements  Vit B12 (2/4) WNL    Blood loss anemia in the setting of active GIB    - Transfuse PRBC for goal Hgb >8 given history of CAD  - F/u hemolysis labs, smear  - See remaining plan as per rectal bleed problem

## 2025-02-05 NOTE — ASSESSMENT & PLAN NOTE
Follows with Dr. Jeffrey at [facility]  TTE 09/2024 EF 40 to 45%, abnormal septal motion due to RV volume overload, unable to assess diastolic function. 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

## 2025-02-05 NOTE — CONSULTS
Palliative Care Consult      This is Hospital Day: 2    Conversation/Goals of Care:  Discussed with the patient's son Marko, he states that they would want to have a discussion about hospice and not going back to the hospital at home.  They are agreeable to the palliative NP's.  They declined a family meeting here tomorrow and wish to talk as a family at home.  Palliative NP's were set up by social work going forward.  Current goals are life-prolonging and patient will want to come and get a transfusion.    Assessment/Plan  Assessment & Plan  Palliative care by specialist  89 y.o. with a PMH of diverticular bleed presenting with GIB    - Code status: Do Not Resuscitate / Allow Natural Death  - Prognosis: guarded to grim  - Advance Directives: in place, not in chart  - Surrogate Decision Maker: names all 4 sons  - Capacity intact   - Spiritual & Existential Needs: none  Debility  - Debility likely multifactorial in the setting of recurrent GIB, CHF, CAD.    - Living situation prior to hospitalization has 24 hour care at home.   - Baseline functional status is WC/bedbound.   - Current Palliative Performance Status: 30%  - Baseline Palliative Performance Status: 40%   - Frailty Yes  - Patient remains high risk for further deterioration and functional decline.    Plan:  - Set up home based PC NP's at home  - Palliative care will continue to follow for complex medical decision making       Reason for Consult:  Assistance with clarification of goals of care    HPI      Source: chart review, the patient, and son, Dr. Crocker    Samy Elena is an 89 y.o. 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  He has had several visits due to the same. He also has recurrent pleural effusion for which he has had multiple thoracentesis in the past.  He presented with a GI bleed.  He feels much better after some blood.  He has weekly blood work that checks his hemoglobin.  He is  not a candidate for further testing or surgery and he is supposed to see hematology to set up transfusion to limit hospitalizations.  I discussed continued transfusion versus hospice and comfort with the son.  He states that his father would not want to have a conversation in the hospital but will speak to him today.  He states that the 4 children take turns seeing the patient after work and his brother is coming this afternoon.  He declines a family meeting because they cannot get out of work.  His plan is to talk at home and he agrees to palliative NP visit.  D/W Dr. Crocker, agreeable to home NP visit.       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)     Coronary artery disease     Disease of thyroid gland     Will catheter in place     6/25 not at present    GI (gastrointestinal bleed)     Hypertension     Myocardial infarction (CMS/HCC)     Orthostatic hypotension     TIA (transient ischemic attack)        Past Surgical History  Past Surgical History   Procedure Laterality Date    cataract extraction right eye with implant and limbal relaxing incision Right 7/18/2024    Performed by Hayder Moses MD at  OR [facility]    Cataract extraction w/ intraocular lens implant Left     Cataract extraction with LRI and anterior vitrectomy left eye Left 11/16/2023    Performed by Hayder Moses MD at  OR [facility]    Cholecystectomy      Colostomy      COLOSTOMY LAPAROSCOPIC N/A 8/3/2022    Performed by Mat Bryant MD at [facility] OR    Coronary artery bypass graft      DIAGNOSTIC FLEXIBLE SIGMOIDOSCOPY WITH COLLECTION OF SPECIMEN BY WASHING N/A 11/27/2024    Performed by David Holcomb MD at [facility] GI    DIAGNOSTIC FLEXIBLE  SIGMOIDOSCOPY WITH COLLECTION OF SPECIMEN BY WASHING N/A 10/18/2024    [facility]formed by [place] Lopez [facility]    DIAGNOSTIC UPPER GASTROINTESTINAL ENDOSCOPY WITH COLLECTION OF SPECIMEN BY WASHING N/A 11/27/2024    Performed by David Holcomb [facility] at [facility]    FLEXIBLE SIGMOIDOSCOPY WITH BIOPSY N/A 10/23/2024    Performed by David Holcomb [facility] at [facility]    INCISION AND DRAINAGE WITH DEBRIDMENT OF BUTTOCK, AND PERINEUM N/A 8/2/2022    Performed by [place] [facility]    Joint replacement Left     Knee    RIGHT HEART CATH N/A 8/4/2022    Performed by Cristal Lacey MD [facility]    Thyroidectomy      WASHOUT OF PERINEAL WOUND, COLONIC LAVAGE N/A 8/3/2022    Performed by Mat Bryant [facility] at [facility]       Anglican:  Yazidism  Christianity / Spiritual:   no spiritual concerns identified    Review of Systems:  All other systems reviewed and negative except as noted in the HPI.    [place] PDMP Portal, [facility] (Outside records) query reviewed with no concerns.    Current Medications:  Current Facility-Administered Medications   Medication Dose Route Frequency Provider Last Rate Last Admin    [Provider Managed Hold] aspirin enteric coated tablet 81 mg  81 mg oral Daily Arabella Miller MD        calcium (as carbonate) (TUMS) chewable tablet 200 mg of elemental calcium  200 mg of elemental calcium oral Daily Arabella Miller MD   200 mg of elemental calcium at 02/04/25 2120    carboxymethylcellulose (REFRESH PLUS) 0.5 % ophthalmic dropperette 1 drop  1 drop Both Eyes q2h while awake Arabella Miller MD   1 drop at 02/05/25 0021    cyanocobalamin (VITAMIN B12) tablet 500 mcg  500 mcg oral q AM Arabella Miller MD   500 mcg at 02/04/25 2119    glucose chewable tablet 16-32 g of dextrose  16-32 g of dextrose oral PRN Arabella Miller MD        Or    dextrose 40 % oral gel 15-30 g of dextrose  15-30 g of dextrose oral PRN Arabella Miller MD        Or    glucagon  (GLUCAGEN) injection 1 mg  1 mg intramuscular PRN Arabella Miller MD        Or    dextrose 50 % in water (D50) injection 12.5 g  25 mL intravenous PRN Arabella Miller MD        [Provider Managed Hold] escitalopram (LEXAPRO) tablet 10 mg  10 mg oral q AM Arabella Miller MD        [Provider Managed Hold] famotidine (PEPCID) tablet 10 mg  10 mg oral Daily Arabella Miller MD        [Provider Managed Hold] ferrous sulfate tablet 325 mg  325 mg oral Daily with breakfast Arabella Miller MD        finasteride (PROSCAR) tablet 5 mg  5 mg oral Daily Arabella Miller MD   5 mg at 02/04/25 2059    latanoprost (XALATAN) 0.005 % ophthalmic solution 1 drop  1 drop Both Eyes Nightly Arabella Miller MD   1 drop at 02/04/25 2307    levothyroxine (SYNTHROID) tablet 100 mcg  100 mcg oral Daily (6:30a) Arabella Miller MD        melatonin capsule 10 mg  10 mg oral Nightly Arabella Miller MD        methenamine (HIPREX) tablet 1 g  1 g oral Daily before lunch Arabella Miller MD        [Provider Managed Hold] metoprolol succinate ER (TOPROL-XL) pre-halved 24 hr ER tablet 12.5 mg  12.5 mg oral Daily with dinner Arabella Miller MD        pantoprazole (PROTONIX) injection 40 mg  40 mg intravenous q12h [facility] Marcello Crocekr DO   40 mg at 02/04/25 2059    sodium chloride 0.9 % infusion  5 mL/hr intravenous PRN Arabella Miller MD        sodium chloride 0.9 % infusion  5 mL/hr intravenous PRN Alesha Allan MD        sodium chloride 0.9 % infusion  5 mL/hr intravenous PRN Marcello Crocker DO        [Provider Managed Hold] tamsulosin (FLOMAX) 24 hr ER capsule 0.4 mg  0.4 mg oral Daily Arabella Miller MD        thiamine (VITAMIN B1) tablet 100 mg  100 mg oral Daily Arabella Miller MD   100 mg at 02/04/25 2059    [Provider Managed Hold] torsemide (DEMADEX) tablet 10 mg  10 mg oral Daily Arabella Miller MD         Current Outpatient Medications   Medication  Sig Dispense Refill    aspirin 81 mg enteric coated tablet Take 81 mg by mouth daily. 9 am      bimatoprost (LUMIGAN) 0.01 % ophthalmic drops Administer 1 drop into the left eye nightly.      calcium, as carbonate, (TUMS) 200 mg calcium (500 mg) chewable tablet Take 1 tablet by mouth daily. 11 am      carboxymethylcellulose (REFRESH PLUS) 0.5 % dropperette Administer 1 drop into both eyes every2 (two) hours while awake.      cranberry extract 425 mg capsule Take 425 mg by mouth daily. 5 pm      cyanocobalamin (VITAMIN B12) 500 mcg tablet Take 500 mcg by mouth every morning. 9 am      escitalopram (LEXAPRO) 10 mg tablet Take 10 mg by mouth every morning. 9 am      famotidine (PEPCID) 10 mg tablet Take 10 mg by mouth daily. 11 am      ferrous sulfate 325 mg (65 mg iron) tablet Take 65 mg by mouth daily with breakfast. 11 am      finasteride (PROSCAR) 5 mg tablet Take 5 mg by mouth daily. 9 am      levothyroxine (SYNTHROID) 100 mcg tablet Take 100 mcg by mouth daily before breakfast. 7 am      melatonin 10 mg capsule Take 10 mg by mouth nightly. 9 pm      methenamine (HIPREX) 1 gram tablet Take 1 g by mouth daily before lunch. 11 am      metoprolol succinate XL (TOPROL-XL) 25 mg 24 hr tablet Take 12.5 mg by mouth daily with dinner. 5 pm      omeprazole (PriLOSEC) 20 mg capsule Take 20 mg by mouth daily. 11 am      potassium chloride (KLOR-CON) 10 mEq CR tablet Take 10 mEq by mouth 2 (two) times a day. 9 am and 5 pm      tamsulosin (FLOMAX) 0.4 mg capsule Take 0.4 mg by mouth every morning. 9 am      thiamine HCl (VITAMIN B1) 100 mg tablet Take 100 mg by mouth every morning. 9 am      torsemide (DEMADEX) 10 mg tablet Take 10 mg by mouth every morning. 9 am         Allergies:  Allergies   Allergen Reactions    Jardiance [Empagliflozin] Other (see comments)     denise's gangrene       Objective    Physical Exam:   Physical Exam  Vitals and nursing note reviewed.   Constitutional:       General: He is not in acute  distress.  HENT:      Mouth/Throat:      Mouth: Mucous membranes are moist.   Eyes:      Conjunctiva/sclera: Conjunctivae normal.   Cardiovascular:      Rate and Rhythm: Normal rate.   Pulmonary:      Effort: Pulmonary effort is normal.   Abdominal:      Palpations: Abdomen is soft.   Musculoskeletal:      Right lower leg: No edema.      Left lower leg: No edema.   Skin:     General: Skin is warm and dry.   Neurological:      Mental Status: He is oriented to person, place, and time.      Comments: [facility]     Vitals:  Vitals:    02/05/25 0821   BP: (!) 96/53   Pulse: 87   Resp: 16   Temp:    SpO2: 96%       Laboratory Studies:    I have reviewed the patient's pertinent labs to the time of note.  Significant abnormals are elevated creatinine, elevated iron level, severe anemia.    Imaging and Other Studies:     I have independently reviewed the patient's pertinent imaging to the time of note. Significant abnormals are post thoracentesis, improved .    I have independently reviewed the pertinent cardiac studies to the time of note and agree with reported results.    Advanced care planning Samy has an ACP and Samy's surrogate decision maker is his 4 sons.    Thank you for the opportunity to participate in this patient's hospital plan of care.     Ailyn Moreira, DO  Office [phone]

## 2025-02-05 NOTE — ASSESSMENT & PLAN NOTE
Maintained on escitalopram 10 mg daily  EKG with QTc 539    - Hold SSRI due to risk of prolonged QTc

## 2025-02-05 NOTE — ASSESSMENT & PLAN NOTE
History of recurrent pleural effusion  Most recent diagnostic Thora was done in 09/2024 with lights criteria consistent with transudative effusion, however of note Thora during 07/2024 admission was exudative  Now gets therapeutic thoracenteses most recently on 1/16 with removal of 800 mL serosanguineous fluid  Was scheduled for OP thoracentesis on 2/6 by IR    - S/p IR 2/5 w/ 800 cc clear hilario fluid removed and large effusion remaining.   - F/u CXR s/p thoracentesis

## 2025-02-05 NOTE — POST-PROCEDURE NOTE
Interventional Radiology Brief Postprocedure Note    Samy Elena     Attending: SOPHIA Seals / Gigi Berry M.D.    Assistant: n/a    Diagnosis: symptomatic effusion    Description of procedure: US guided left thoracentesis    Contrast: none     Anesthesia:  Local (Lidocaine)    Volume of Lidocaine Utilized (ml): 10ml     Medications: none     Complications: None      Estimated Blood Loss: Estimated Blood Loss: None    Anticoagulation: n/a    Specimens: 800 ml clear hilario fluid    Findings: Successful US Guided left thoracentesis with 800 ml removed. Large effusion remains. VSS throughout CXR pending.    2/5/2025 8:46 AM

## 2025-02-05 NOTE — ASSESSMENT & PLAN NOTE
Maintained on escitalopram 10 mg daily  EKG with QTc 539    - Hold SSRI due to risk of prolonged Qtc and GI bleed risk

## 2025-02-05 NOTE — ASSESSMENT & PLAN NOTE
Chief Complaint   Patient presents with    [facility] Visit     Last seen 3+ years ago    Annual exam with pap smear and mammogram order    No complaints    Chaperone declined     G0  Work - [employer]  Mom w early stage Br Ca in ~ late 40's-early 50's    REVIEW OF SYSTEMS    Please see HPI for reported pertinent positives, which would supersede this ROS    Constitutional:  Denies fever, chills, wt gain or loss, fatigue    Genito-Urinary:  Denies genital lesion or sores, vaginal dryness, itching  or pain.  No abnormal vaginal discharge or unexplained vaginal bleeding.  No dysuria, urinary incontinence or frequency.  Denies pelvic pain, dysmenorrhea or dyspareunia.    Eyes:  Denies vision changes, dryness  ENT:  No hearing loss, sinus pain or congestion, nosebleeds  Cardiovascular:  No chest pain or palpitations  Respiratory:  No SOB, cough, wheezing  GI:  No Nausea, vomiting, diarrhea, constipation, abdominal pain  Musculoskeletal:  No new back pain. joint pain, peripheral edema  Skin:  No rash or skin lesion  Neurologic:  No HA, numbness or dizziness  Psychiatric:  No new anxiety or depression  Endocrine:  No loss of hair or hirsutism  Hematologic/lymphatic:  No swollen lymph nodes.  No reported tendency for easy bruising or bleeding    Patient admits to no other systemic complaints      Vitals:    09/17/24 0804   BP: 102/74       PHYSICAL EXAM:    PSYCH:  Pt is alert, oriented and cooperative    SKIN: warm, dry, w/o lesion    HEAD AND FACE:  External inspection of eyes, ears, functional cranial nerves normal and intact    THYROID:  No thyromegaly    CARDIOVASCULAR:  Warm and well Perfused    PULMONARY:  No respiratory distress    ABDOMEN:  soft, nontender.  No mass or organomegaly.      BREAST:  Breasts are symmetric to inspection and palpation.  No mass palpable bilaterally.  There is no axillary lymphadenopathy    PELVIC:    External genitalia, urethra, perianal region normal to inspection and palpation  Present with significant rectal bleeding  BP 80s/50s, not tachycardic although he is maintained on a beta-blocker  Hgb 6.0 with , from baseline 8  Folate from 12/2024 wnl, no B12 on chart review. Iron studies from 12/24 within normal limits.  Maintained on iron supplements    Blood loss anemia in the setting of active GIB    - Transfuse PRBC for goal Hgb >8 given history of CAD  - Obtain vitamin B12 level, replete as needed   if indicated.  No inguinal LA    Vagina without lesions.    Cervix seen and visually normal      Bimanual exam:      No pelvic mass palpable    Uterus nontender, midline in pelvis    No adnexal masses or tenderness    Assessment/Plan    Diagnoses and all orders for this visit:  Well woman exam with routine gynecological exam  -     THINPREP PAP TEST  Breast cancer screening by mammogram  -     BI mammo bilateral screening tomosynthesis; Future

## 2025-02-05 NOTE — PROGRESS NOTES
Spoke with patient's son and confirmed with medication list from [facility] and/or patient's own pharmacy to complete the medication reconciliation.     Prior to admission medication list:    famotidine (PEPCID) 10 mg tablet, Take 10 mg by mouth daily. 11 am, Disp: , Rfl:     finasteride (PROSCAR) 5 mg tablet, Take 5 mg by mouth daily. 9 am, Disp: , Rfl:     tamsulosin (FLOMAX) 0.4 mg capsule, Take 0.4 mg by mouth every morning. 9 am, Disp: , Rfl:     aspirin 81 mg enteric coated tablet, Take 81 mg by mouth daily. 9 am, Disp: , Rfl:     bimatoprost (LUMIGAN) 0.01 % ophthalmic drops, Administer 1 drop into the left eye nightly., Disp: , Rfl:     calcium, as carbonate, (TUMS) 200 mg calcium (500 mg) chewable tablet, Take 1 tablet by mouth daily. 11 am, Disp: , Rfl:     carboxymethylcellulose (REFRESH PLUS) 0.5 % dropperette, Administer 1 drop into both eyes every2 (two) hours while awake., Disp: , Rfl:     cranberry extract 425 mg capsule, Take 425 mg by mouth daily. 5 pm, Disp: , Rfl:     cyanocobalamin (VITAMIN B12) 500 mcg tablet, Take 500 mcg by mouth every morning. 9 am, Disp: , Rfl:     escitalopram (LEXAPRO) 10 mg tablet, Take 10 mg by mouth every morning. 9 am, Disp: , Rfl:     ferrous sulfate 325 mg (65 mg iron) tablet, Take 65 mg by mouth daily with breakfast. 11 am, Disp: , Rfl:     levothyroxine (SYNTHROID) 100 mcg tablet, Take 100 mcg by mouth daily before breakfast. 7 am, Disp: , Rfl:     melatonin 10 mg capsule, Take 10 mg by mouth nightly. 9 pm, Disp: , Rfl:     methenamine (HIPREX) 1 gram tablet, Take 1 g by mouth daily before lunch. 11 am, Disp: , Rfl:     metoprolol succinate XL (TOPROL-XL) 25 mg 24 hr tablet, Take 12.5 mg by mouth daily with dinner. 5 pm, Disp: , Rfl:     omeprazole (PriLOSEC) 20 mg capsule, Take 20 mg by mouth daily. 11 am, Disp: , Rfl:     potassium chloride (KLOR-CON) 10 mEq CR tablet, Take 10 mEq by mouth 2 (two) times a day. 9 am and 5 pm, Disp: , Rfl:     thiamine HCl  (VITAMIN B1) 100 mg tablet, Take 100 mg by mouth every morning. 9 am, Disp: , Rfl:     torsemide (DEMADEX) 10 mg tablet, Take 10 mg by mouth every morning. 9 am, Disp: , Rfl:      Compliance:   - Finasteride last filled 8/24/24 for 90 day supply   - Klor con last filled 8/20/24 for 90 day supply   - All other medications have recent fill history or can be found as OTC products.

## 2025-02-06 ENCOUNTER — APPOINTMENT (INPATIENT)
Dept: RADIOLOGY | Facility: HOSPITAL | Age: 89
DRG: 378 | End: 2025-02-06
Payer: MEDICARE

## 2025-02-06 LAB
ANION GAP SERPL CALC-SCNC: 8 MEQ/L (ref 3–15)
BUN SERPL-MCNC: 25 MG/DL (ref 7–25)
CA-I BLD-SCNC: 1.15 MMOL/L (ref 1.15–1.27)
CALCIUM SERPL-MCNC: 7.1 MG/DL (ref 8.6–10.3)
CHLORIDE SERPL-SCNC: 109 MEQ/L (ref 98–107)
CO2 SERPL-SCNC: 24 MEQ/L (ref 21–31)
CREAT SERPL-MCNC: 1.4 MG/DL (ref 0.7–1.3)
CROSSMATCH: NORMAL
EGFRCR SERPLBLD CKD-EPI 2021: 46.9 ML/MIN/1.73M*2
ERYTHROCYTE [DISTWIDTH] IN BLOOD BY AUTOMATED COUNT: 21.3 % (ref 11.6–14.4)
ERYTHROCYTE [DISTWIDTH] IN BLOOD BY AUTOMATED COUNT: 21.7 % (ref 11.6–14.4)
ERYTHROCYTE [DISTWIDTH] IN BLOOD BY AUTOMATED COUNT: 21.9 % (ref 11.6–14.4)
ERYTHROCYTE [DISTWIDTH] IN BLOOD BY AUTOMATED COUNT: 22 % (ref 11.6–14.4)
GLUCOSE SERPL-MCNC: 93 MG/DL (ref 70–99)
HCT VFR BLD AUTO: 21.2 % (ref 40.1–51)
HCT VFR BLD AUTO: 24.3 % (ref 40.1–51)
HCT VFR BLD AUTO: 24.7 % (ref 40.1–51)
HCT VFR BLD AUTO: 25.7 % (ref 40.1–51)
HGB BLD-MCNC: 6.9 G/DL (ref 13.7–17.5)
HGB BLD-MCNC: 7.8 G/DL (ref 13.7–17.5)
HGB BLD-MCNC: 8.1 G/DL (ref 13.7–17.5)
HGB BLD-MCNC: 8.5 G/DL (ref 13.7–17.5)
ISBT CODE: 5100
LACTATE SERPL-SCNC: 1.2 MMOL/L (ref 0.4–2)
MAGNESIUM SERPL-MCNC: 1.9 MG/DL (ref 1.8–2.5)
MCH RBC QN AUTO: 30.1 PG (ref 28–33.2)
MCH RBC QN AUTO: 30.4 PG (ref 28–33.2)
MCH RBC QN AUTO: 30.7 PG (ref 28–33.2)
MCH RBC QN AUTO: 30.8 PG (ref 28–33.2)
MCHC RBC AUTO-ENTMCNC: 32.1 G/DL (ref 32.2–36.5)
MCHC RBC AUTO-ENTMCNC: 32.5 G/DL (ref 32.2–36.5)
MCHC RBC AUTO-ENTMCNC: 32.8 G/DL (ref 32.2–36.5)
MCHC RBC AUTO-ENTMCNC: 33.1 G/DL (ref 32.2–36.5)
MCV RBC AUTO: 91.8 FL (ref 83–98)
MCV RBC AUTO: 93.1 FL (ref 83–98)
MCV RBC AUTO: 93.4 FL (ref 83–98)
MCV RBC AUTO: 95.7 FL (ref 83–98)
PLATELET # BLD AUTO: 103 K/UL (ref 150–350)
PLATELET # BLD AUTO: 87 K/UL (ref 150–350)
PLATELET # BLD AUTO: 89 K/UL (ref 150–350)
PLATELET # BLD AUTO: 89 K/UL (ref 150–350)
PMV BLD AUTO: 10.3 FL (ref 9.4–12.4)
PMV BLD AUTO: 10.5 FL (ref 9.4–12.4)
PMV BLD AUTO: 10.6 FL (ref 9.4–12.4)
PMV BLD AUTO: 9.5 FL (ref 9.4–12.4)
POTASSIUM SERPL-SCNC: 3.9 MEQ/L (ref 3.5–5.1)
PRODUCT CODE: NORMAL
PRODUCT STATUS: NORMAL
RBC # BLD AUTO: 2.27 M/UL (ref 4.5–5.8)
RBC # BLD AUTO: 2.54 M/UL (ref 4.5–5.8)
RBC # BLD AUTO: 2.69 M/UL (ref 4.5–5.8)
RBC # BLD AUTO: 2.76 M/UL (ref 4.5–5.8)
SODIUM SERPL-SCNC: 141 MEQ/L (ref 136–145)
SPECIMEN EXP DATE BLD: NORMAL
UNIT ABO: NORMAL
UNIT ID: NORMAL
UNIT RH: POSITIVE
WBC # BLD AUTO: 4.87 K/UL (ref 3.8–10.5)
WBC # BLD AUTO: 4.98 K/UL (ref 3.8–10.5)
WBC # BLD AUTO: 5.13 K/UL (ref 3.8–10.5)
WBC # BLD AUTO: 5.31 K/UL (ref 3.8–10.5)

## 2025-02-06 PROCEDURE — 82330 ASSAY OF CALCIUM: CPT

## 2025-02-06 PROCEDURE — 36415 COLL VENOUS BLD VENIPUNCTURE: CPT

## 2025-02-06 PROCEDURE — 74174 CTA ABD&PLVS W/CONTRAST: CPT

## 2025-02-06 PROCEDURE — 83735 ASSAY OF MAGNESIUM: CPT

## 2025-02-06 PROCEDURE — 63600105 HC IODINE BASED CONTRAST: Mod: JZ

## 2025-02-06 PROCEDURE — 83605 ASSAY OF LACTIC ACID: CPT

## 2025-02-06 PROCEDURE — 82310 ASSAY OF CALCIUM: CPT

## 2025-02-06 PROCEDURE — 21400000 HC ROOM AND CARE CCU/INTERMEDIATE

## 2025-02-06 PROCEDURE — 25800000 HC PHARMACY IV SOLUTIONS

## 2025-02-06 PROCEDURE — P9016 RBC LEUKOCYTES REDUCED: HCPCS

## 2025-02-06 PROCEDURE — 63600000 HC DRUGS/DETAIL CODE: Mod: JZ

## 2025-02-06 PROCEDURE — 36430 TRANSFUSION BLD/BLD COMPNT: CPT

## 2025-02-06 PROCEDURE — 85027 COMPLETE CBC AUTOMATED: CPT

## 2025-02-06 PROCEDURE — 99233 SBSQ HOSP IP/OBS HIGH 50: CPT | Performed by: STUDENT IN AN ORGANIZED HEALTH CARE EDUCATION/TRAINING PROGRAM

## 2025-02-06 PROCEDURE — 80048 BASIC METABOLIC PNL TOTAL CA: CPT

## 2025-02-06 PROCEDURE — 63700000 HC SELF-ADMINISTRABLE DRUG

## 2025-02-06 RX ORDER — CARBOXYMETHYLCELLULOSE SODIUM 5 MG/ML
1 SOLUTION/ DROPS OPHTHALMIC EVERY 2 HOUR PRN
Status: DISCONTINUED | OUTPATIENT
Start: 2025-02-06 | End: 2025-02-08 | Stop reason: HOSPADM

## 2025-02-06 RX ORDER — IOPAMIDOL 755 MG/ML
100 INJECTION, SOLUTION INTRAVASCULAR
Status: COMPLETED | OUTPATIENT
Start: 2025-02-06 | End: 2025-02-06

## 2025-02-06 RX ADMIN — SODIUM CHLORIDE 125 MG: 9 INJECTION, SOLUTION INTRAVENOUS at 09:30

## 2025-02-06 RX ADMIN — IOPAMIDOL 100 ML: 755 INJECTION, SOLUTION INTRAVENOUS at 01:56

## 2025-02-06 RX ADMIN — LEVOTHYROXINE SODIUM 100 MCG: 0.1 TABLET ORAL at 05:13

## 2025-02-06 RX ADMIN — METHENAMINE HIPPURATE 1 G: 1 TABLET ORAL at 12:07

## 2025-02-06 RX ADMIN — THIAMINE HCL TAB 100 MG 100 MG: 100 TAB at 09:34

## 2025-02-06 RX ADMIN — LATANOPROST 1 DROP: 50 SOLUTION OPHTHALMIC at 22:02

## 2025-02-06 RX ADMIN — FINASTERIDE 5 MG: 5 TABLET, FILM COATED ORAL at 09:34

## 2025-02-06 RX ADMIN — Medication 10 MG: at 22:01

## 2025-02-06 RX ADMIN — PANTOPRAZOLE SODIUM 40 MG: 40 INJECTION, POWDER, LYOPHILIZED, FOR SOLUTION INTRAVENOUS at 09:22

## 2025-02-06 RX ADMIN — CALCIUM CARBONATE 200 MG OF ELEMENTAL CALCIUM: 500 TABLET, CHEWABLE ORAL at 09:34

## 2025-02-06 RX ADMIN — Medication 1 DROP: at 05:13

## 2025-02-06 RX ADMIN — PANTOPRAZOLE SODIUM 40 MG: 40 INJECTION, POWDER, LYOPHILIZED, FOR SOLUTION INTRAVENOUS at 22:00

## 2025-02-06 RX ADMIN — CYANOCOBALAMIN TAB 500 MCG 500 MCG: 500 TAB at 09:34

## 2025-02-06 ASSESSMENT — COGNITIVE AND FUNCTIONAL STATUS - GENERAL
MOVING TO AND FROM BED TO CHAIR: 2 - A LOT
WALKING IN HOSPITAL ROOM: 2 - A LOT
STANDING UP FROM CHAIR USING ARMS: 2 - A LOT
WALKING IN HOSPITAL ROOM: 2 - A LOT
STANDING UP FROM CHAIR USING ARMS: 2 - A LOT
CLIMB 3 TO 5 STEPS WITH RAILING: 1 - TOTAL
CLIMB 3 TO 5 STEPS WITH RAILING: 1 - TOTAL
MOVING TO AND FROM BED TO CHAIR: 1 - TOTAL

## 2025-02-06 NOTE — PROGRESS NOTES
"aSmy Jackson Kassy   [phone]    Your doctor has referred you for a   that requires the injection of an iodinated contrast material into your bloodstream. This iodinated contrast material, sometimes referred to as \"x-ray dye\" allows for better interpretation of the x-ray films or CT images and results in a more accurate interpretation of the examination.     Without the use of iodinated contrast (x-ray dye), the examination may be less informative and result in a suboptimal examination, and possibly a delay in diagnosis and, if needed, treatment.     The iodinated contrast material is given through a small needle or catheter placed into a vein, usually on the inside of the elbow, on the back of hand, or in a vein in the foot or lower leg.    The most common, though still rare, potential reaction to an intravenous contrast injection is an allergic-like reaction. Most allergic-like reactions are mild, though a small subset of people can have more severe reactions. Mild reactions include mild / scattered hives, itching, scratchy throat, sneezing and nasal congestion. More severe reactions include facial swelling, severe difficulty breathing, wheezing and anaphylactic shock. Those with a prior history allergic-like reaction to the same class of contrast media (such as CT contrast or MRI contrast) are at the highest risk for an allergic reaction.     If you believe you had an allergic reaction to contrast in the past, please let our staff know. We can determine if this increases your risk for a future reaction and provide steps to decrease the risk. This may delay your examination, but it decreases the risk of having a new and possibly more severe reaction to the contrast injection.    People with a history of prior allergic reactions to other substances (such as unrelated medications and food) and patients with a history of asthma have slightly increased risk for an allergic reaction from contrast material when " compared with the general population, but do not require to be pretreated prior to a contrast injection.    You should notify the physician, nurse or technologist if you have ever had any of these conditions or if you have any questions.

## 2025-02-06 NOTE — ASSESSMENT & PLAN NOTE
History of A-fib follows with Dr. Jeffrey  Maintained on metoprolol, previously on Xarelto, now discontinued  Anticoagulation has been complicated by recurrent GIB  [id number] score 5    - Hold any AC, BB in the setting of active GIB  - Patient has declined NISH closure procedure in the past

## 2025-02-06 NOTE — ASSESSMENT & PLAN NOTE
Present with significant rectal bleeding  BP 80s/50s, not tachycardic although he is maintained on a beta-blocker  Hgb 6.0 with , from baseline 8  Folate from 12/2024 wnl, no B12 on chart review. Iron studies from 12/24 within normal limits.  Maintained on iron supplements  Vit B12 (2/4) WNL    Blood loss anemia in the setting of active GIB    - s/p 2 unts PRBC overnight 2/6  - Transfuse PRBC for goal Hgb >8 given history of CAD  - F/u hemolysis labs, smear  - See remaining plan as per rectal bleed problem

## 2025-02-06 NOTE — PROGRESS NOTES
Discussed case with interventional radiology  Contacted the son Marko in order to discuss possible paths proceeding forward    Notified son of the rectal sigmoidal stump bleed that was found on CT scan.  Notified son that we had transfused 1 unit of blood, was currently rechecking CBC.  Stated that his father did not show any signs of further active bleeding.  Blood pressure had improved to 90s/50s.    Discussed the potential options moving forward  Most aggressive would be to activate interventional radiology team and possibly general surgery to pursue artery embolization.  However per discussion with interventional radiology due to the patient's tortuous anatomy there was a chance for dissection and the patient was at increased risk due to his comorbid conditions.  Explained that the procedure would be similar to what the patient underwent in October.    The most conservative option would be to continue to offer support in the form of transfusion and possibly fluids in order to maintain volume.  As the patient is high risk for any procedure, the team could continue to monitor as it is possible that the bleed will resolve on its own.    Offered the option of temporarily supporting the patient until primary team and interventional radiology are available in the hospital.  To reevaluate the status of the patient's bleed further into the morning and to discuss them risk and benefits of undergoing an IR procedure.    Marko stated he would prefer not to make decisions for procedures right now and as his father's blood pressure has improved and there was no signs of active bleeding felt it was reasonable to wait until family and the primary team and interventional radiology could have a further discussion in a few hours. Agreed that this is a reasonable choice.    Stated that we would call the family if there were any major changes in the care.  Discussed with interventional radiology family's decision

## 2025-02-06 NOTE — ASSESSMENT & PLAN NOTE
Extensive GI history notable for recurrent GIB s/p colostomy with rectal stump (transverse colon was divided)  S/p IR performed successful subselective SMA and MARIE arteriography 10/2024  Most recently admitted on 12/30 for stomal bleed felt to be diverticular in etiology vs stomal irritation managed with PRBC and no invasive interventions  Most recent scopes: Flex sig on 10/18 via stoma with intact stoma, diverticulosis, no active bleeding.  Flex sig on 10/23 via rectum with blood clots throughout rectum and sigmoid with many diverticula.   11/27 EGD unremarkable. 11/27 flex sig via stoma with moderate amount of brown stool, no fresh or residual blood, diverticulosis.  Presents today with rectal and stomal bleeding and acute hemoglobin drop  S/p 2 units in the ED    Rectal/stomal bleeding likely diverticular bleed secondary to known diverticulosis, which tend to self resolve. Lower concern for UGIB given recent scopes unremarkable; in addition ETT for EGD would be extremely difficult given history of torrential TR.    - GIB overnight 2/6--2 episodes of BRBPR, with no blood in ostomy. Received 2 units blood. Discuss with IR TODAY about treatment (bleed in rectosigmoid stump, likely accessible to IR but with concerning anatomy)--family wanted to defer to day team to determine treatment   ----NPO pending future plans   - GI recommending Gen Surg consult for possible stoma replacement to also treat future bleeding   - palliative c/s and GOC conversation   - Obtain stat CTA for large volume bloody BM  - Support with PRBC for Hgb goal >8 given history of CAD  - Maintain 2 large-bore IVs  - CLD for now  - Palliative care consult

## 2025-02-06 NOTE — PLAN OF CARE
Plan of Care Review  Plan of Care Reviewed With: patient  Progress: no change  Outcome Evaluation: Outcome Evaluation: Received pt. at 0700 asleep in bed with 1 unit of blood running. No complaints of pain, no blood/ blood clots in ostomy and brief, brown stool noted in bag. VSS, Pt. is AAOx4. AFIB on the monitor. 12 pm CBC sent, HGB 8.5. Contact precautions maintained. Call bell within reach. FSP in place.

## 2025-02-06 NOTE — ASSESSMENT & PLAN NOTE
Follows with Dr. Jeffrey at [facility]  TTE 09/2024 EF 40 to 45%, abnormal septal motion due to RV volume overload, unable to assess diastolic function. 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

## 2025-02-06 NOTE — PROGRESS NOTES
"Samy Jackson Kassy   [id number]    Your doctor has referred you for a   that requires the injection of an iodinated contrast material into your bloodstream. This iodinated contrast material, sometimes referred to as \"x-ray dye\" allows for better interpretation of the x-ray films or CT images and results in a more accurate interpretation of the examination.     Without the use of iodinated contrast (x-ray dye), the examination may be less informative and result in a suboptimal examination, and possibly a delay in diagnosis and, if needed, treatment.     The iodinated contrast material is given through a small needle or catheter placed into a vein, usually on the inside of the elbow, on the back of hand, or in a vein in the foot or lower leg.    The most common, though still rare, potential reaction to an intravenous contrast injection is an allergic-like reaction. Most allergic-like reactions are mild, though a small subset of people can have more severe reactions. Mild reactions include mild / scattered hives, itching, scratchy throat, sneezing and nasal congestion. More severe reactions include facial swelling, severe difficulty breathing, wheezing and anaphylactic shock. Those with a prior history allergic-like reaction to the same class of contrast media (such as CT contrast or MRI contrast) are at the highest risk for an allergic reaction.     If you believe you had an allergic reaction to contrast in the past, please let our staff know. We can determine if this increases your risk for a future reaction and provide steps to decrease the risk. This may delay your examination, but it decreases the risk of having a new and possibly more severe reaction to the contrast injection.    People with a history of prior allergic reactions to other substances (such as unrelated medications and food) and patients with a history of asthma have slightly increased risk for an allergic reaction from contrast material when " compared with the general population, but do not require to be pretreated prior to a contrast injection.    You should notify the physician, nurse or technologist if you have ever had any of these conditions or if you have any questions.

## 2025-02-06 NOTE — PROGRESS NOTES
IR was consulted for lower GI bleed.  CTA shows active extravasation at the rectal sigmoid stump.  Previously angiogram was performed 10/15/2024.  At that time, due to tortuous vessel, a dissection occurred at proximal sigmoidal artery during angiogram.      The patient was stable.  The primary team discussed with the patient's son, who decided to hold off the procedure as of now.      Please call back if we need to reconsider embolization.    Gigi Berry MD, PhD  Interventional Radiology

## 2025-02-06 NOTE — NURSING NOTE
Patient received at 2300, AAO x4, critical lab resulted, hgb 7, IU prbc transfused. Stat ct scan done.  Patient had multiple episode of rectal bleeding associated with clots, no c/o pain or discomfort. BP soft, colostomy intact with brown stool noted. MD made aware. 2nd UPRBC transfusing, HGB 6.9.

## 2025-02-06 NOTE — PROGRESS NOTES
"Samy Jackson Kassy   [id number]    Your doctor has referred you for a   that requires the injection of an iodinated contrast material into your bloodstream. This iodinated contrast material, sometimes referred to as \"x-ray dye\" allows for better interpretation of the x-ray films or CT images and results in a more accurate interpretation of the examination.     Without the use of iodinated contrast (x-ray dye), the examination may be less informative and result in a suboptimal examination, and possibly a delay in diagnosis and, if needed, treatment.     The iodinated contrast material is given through a small needle or catheter placed into a vein, usually on the inside of the elbow, on the back of hand, or in a vein in the foot or lower leg.    The most common, though still rare, potential reaction to an intravenous contrast injection is an allergic-like reaction. Most allergic-like reactions are mild, though a small subset of people can have more severe reactions. Mild reactions include mild / scattered hives, itching, scratchy throat, sneezing and nasal congestion. More severe reactions include facial swelling, severe difficulty breathing, wheezing and anaphylactic shock. Those with a prior history allergic-like reaction to the same class of contrast media (such as CT contrast or MRI contrast) are at the highest risk for an allergic reaction.     If you believe you had an allergic reaction to contrast in the past, please let our staff know. We can determine if this increases your risk for a future reaction and provide steps to decrease the risk. This may delay your examination, but it decreases the risk of having a new and possibly more severe reaction to the contrast injection.    People with a history of prior allergic reactions to other substances (such as unrelated medications and food) and patients with a history of asthma have slightly increased risk for an allergic reaction from contrast material when " compared with the general population, but do not require to be pretreated prior to a contrast injection.    You should notify the physician, nurse or technologist if you have ever had any of these conditions or if you have any questions.

## 2025-02-06 NOTE — CONSULTS
Gastroenterology  Consultation Note       REASON FOR CONSULT   GIB     HISTORY OF PRESENT ILLNESS      This is a 89 y.o. male with a significant cardiac history and recent GI bleeding both from stoma and rectum who presents with recurrent bleeding from stoma.      Patient was admitted in October for blood from stoma.  The CTA at that time showed active bleed in the colon just proximal to the colostomy.  He went to IR where there was no active bleeding but he underwent dissection of the sigmoid artery.  His bleeding slowed and then recurred. Underwent flex sig through the stoma which was intact without any signs of ischemia or strangulation, there were many diverticula in the left colon and 2 tiny polyps which were not removed due to recent Plavix use.  No active bleeding at that time.  The following week he developed bleeding from his rectum.  He underwent flex sig via rectum which showed red blood with clots filling the rectum and sigmoid.  Blood obscured ability to see site of bleeding but presumed diverticular versus possible diversion proctitis.  His Plavix was stopped and he was discharged just on aspirin.  He was admitted again at the end of November for similar presentation.  Flex sig via ostomy showed solid brown stool throughout and diverticulosis with no red blood or melena.  Underwent EGD to rule out potential upper source given recurrent episodes which was unremarkable.  Our team's advice at the time was that he had now undergone several endoscopies without active bleeding via stoma and given his high risk cardiac disease for getting anesthesia would not recommend putting him through further endoscopic procedures unless there was felt to be therapeutic benefit.  Recommended CTA if he had large-volume bleeding in the future. He subsequently re-presented the end of December with blood from stoma, CTA again without active bleeding. He was monitored and discharged.     On Saturday 2/2, pt again noted  significant blood from his stoma with passage of clots. Denied rectal bleeding, NSAID use. It had again increased in output so he presented to hospital. This morning pt feels ok, had recurrent ostomy bleeding prompting GI consult. No acute complaints. Pt had significant bleeding overnight  req 2u RBC and hypotension. CTA obtained overnight showed active extravasation at rectal sigmoid stump, IR had been consulted who stated given prior dissection, he was high risk. Primary team discussed with son who did not wish to make decisions for procedures and wanted to monitor the patient, so transfusion was admin.     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)     Coronary artery disease     Disease of thyroid gland     Will catheter in place     6/25 not at present    GI (gastrointestinal bleed)     Hypertension     Myocardial infarction (CMS/HCC)     Orthostatic hypotension     TIA (transient ischemic attack)        PSHx:  Past Surgical History   Procedure Laterality Date    cataract extraction right eye with implant and limbal relaxing incision Right 7/18/2024    Performed by Hayder Moses MD at  OR [facility]    Cataract extraction w/ intraocular lens implant Left     Cataract extraction with LRI and anterior vitrectomy left eye Left 11/16/2023    Performed by Hayder Moses MD at  OR [facility]    Cholecystectomy      Colostomy      COLOSTOMY LAPAROSCOPIC N/A 8/3/2022    Performed by Mat Bryant MD at [facility] OR    Coronary artery bypass graft      DIAGNOSTIC FLEXIBLE SIGMOIDOSCOPY WITH COLLECTION OF SPECIMEN BY WASHING N/A 11/27/2024    Performed by David Holcomb MD at [facility] GI    DIAGNOSTIC FLEXIBLE SIGMOIDOSCOPY WITH COLLECTION OF SPECIMEN BY WASHING N/A 10/18/2024    Performed by Maine Lopez MD  at [facility] GI    DIAGNOSTIC UPPER GASTROINTESTINAL ENDOSCOPY WITH COLLECTION OF SPECIMEN BY WASHING N/A 11/27/2024    Performed by David Holcomb MD at [facility] GI    FLEXIBLE SIGMOIDOSCOPY WITH BIOPSY N/A 10/23/2024    Performed by David Holcomb MD at [facility] GI    INCISION AND DRAINAGE WITH DEBRIDMENT OF BUTTOCK, AND PERINEUM N/A 8/2/2022    Performed by Mat Bryant MD at [facility] OR    Joint replacement Left     Knee    RIGHT HEART CATH N/A 8/4/2022    Performed by Cristal Lacey MD at [facility] CARDIAC CATH/EP    Thyroidectomy      WASHOUT OF PERINEAL WOUND, COLONIC LAVAGE N/A 8/3/2022    Performed by Mat Bryant MD at [facility] OR       PCP:   Zachery Hernandez MD    MEDICATIONS      Medications Prior to Admission   Medication Sig Dispense Refill Last Dose/Taking    aspirin 81 mg enteric coated tablet Take 81 mg by mouth daily. 9 am   2/4/2025    bimatoprost (LUMIGAN) 0.01 % ophthalmic drops Administer 1 drop into the left eye nightly.   2/3/2025    calcium, as carbonate, (TUMS) 200 mg calcium (500 mg) chewable tablet Take 1 tablet by mouth daily. 11 am   2/4/2025    carboxymethylcellulose (REFRESH PLUS) 0.5 % dropperette Administer 1 drop into both eyes every2 (two) hours while awake.   2/4/2025    cranberry extract 425 mg capsule Take 425 mg by mouth daily. 5 pm   2/3/2025    cyanocobalamin (VITAMIN B12) 500 mcg tablet Take 500 mcg by mouth every morning. 9 am   2/4/2025    escitalopram (LEXAPRO) 10 mg tablet Take 10 mg by mouth every morning. 9 am   2/4/2025    famotidine (PEPCID) 10 mg tablet Take 10 mg by mouth daily. 11 am   2/4/2025    ferrous sulfate 325 mg (65 mg iron) tablet Take 65 mg by mouth daily with breakfast. 11 am   2/4/2025    finasteride (PROSCAR) 5 mg tablet Take 5 mg by mouth daily. 9 am   2/4/2025    levothyroxine (SYNTHROID) 100 mcg tablet Take 100 mcg by mouth daily before breakfast. 7 am   2/4/2025    melatonin 10 mg capsule Take 10 mg by mouth nightly. 9 pm   2/3/2025    methenamine (HIPREX) 1  gram tablet Take 1 g by mouth daily before lunch. 11 am   2/4/2025    metoprolol succinate XL (TOPROL-XL) 25 mg 24 hr tablet Take 12.5 mg by mouth daily with dinner. 5 pm   2/3/2025    omeprazole (PriLOSEC) 20 mg capsule Take 20 mg by mouth daily. 11 am   2/4/2025    potassium chloride (KLOR-CON) 10 mEq CR tablet Take 10 mEq by mouth 2 (two) times a day. 9 am and 5 pm   2/4/2025    tamsulosin (FLOMAX) 0.4 mg capsule Take 0.4 mg by mouth every morning. 9 am   2/4/2025    thiamine HCl (VITAMIN B1) 100 mg tablet Take 100 mg by mouth every morning. 9 am   2/4/2025    torsemide (DEMADEX) 10 mg tablet Take 10 mg by mouth every morning. 9 am   2/4/2025       Home medications were personally reviewed.    ALLERGIES      Jardiance [empagliflozin]    FAMILY HISTORY      No family history on file.    SOCIAL HISTORY      Social History     Socioeconomic History    Marital status:    Tobacco Use    Smoking status: Never    Smokeless tobacco: Never   Vaping Use    Vaping status: Never Used   Substance and Sexual Activity    Alcohol use: Not Currently     Comment: social    Drug use: Never    Sexual activity: Defer     Social Drivers of Health     Financial Resource Strain: Low Risk  (8/4/2023)    Overall Financial Resource Strain (CARDIA)     Difficulty of Paying Living Expenses: Not hard at all   Food Insecurity: No Food Insecurity (2/5/2025)    Hunger Vital Sign     Worried About Running Out of Food in the Last Year: Never true     Ran Out of Food in the Last Year: Never true   Transportation Needs: No Transportation Needs (10/16/2024)    PRAPARE - Transportation     Lack of Transportation (Medical): No     Lack of Transportation (Non-Medical): No   Housing Stability: Low Risk  (10/16/2024)    Housing Stability Vital Sign     Unable to Pay for Housing in the Last Year: No     Number of Times Moved in the Last Year: 0     Homeless in the Last Year: No       REVIEW OF SYSTEMS      14 point ROS negative unless stated per  HPI    PHYSICAL EXAMINATION      Temp:  [36.3 °C (97.3 °F)-36.7 °C (98 °F)] 36.3 °C (97.3 °F)  Heart Rate:  [49-90] 73  Resp:  [16-53] 18  BP: ()/(42-67) 92/51  Body mass index is 20.97 kg/m².    General appearance - alert, well appearing, NAD  Eyes - pupils equal and reactive, extraocular eye movements intact, sclera anicteric  Mouth - mucous membranes moist, pharynx normal without lesions  Chest - no evidence of respiratory distress  Heart - normal rate  Abdomen - soft, nontender, nondistended. Ostomy intact. Mild blood.   Skin - normal coloration and turgor, no rashes, no suspicious skin lesions noted      LABS / IMAGING / EKG        Labs  Reviewed.  Results from last 7 days   Lab Units 02/06/25  0613 02/05/25  0625 02/04/25  1535   SODIUM mEQ/L 141 141 141   POTASSIUM mEQ/L 3.9 3.9 4.3   CHLORIDE mEQ/L 109* 112* 109*   CO2 mEQ/L 24 26 23   BUN mg/dL 25 25 27*   CREATININE mg/dL 1.4* 1.4* 1.5*   CALCIUM mg/dL 7.1* 7.2* 7.3*   ALBUMIN g/dL  --   --  2.4*   BILIRUBIN TOTAL mg/dL  --   --  0.7   ALK PHOS IU/L  --   --  64   ALT IU/L  --   --  4*   AST IU/L  --   --  24   GLUCOSE mg/dL 93 100* 158*     Results from last 7 days   Lab Units 02/06/25  0613   MAGNESIUM mg/dL 1.9     Results from last 7 days   Lab Units 02/06/25  0613 02/06/25  0330 02/05/25  2256 02/04/25  2300 02/04/25  1535   WBC K/uL 5.31 4.87 5.38   < > 4.61   HEMOGLOBIN g/dL 7.8* 6.9* 7.0*   < > 6.0*   HEMATOCRIT % 24.3* 21.2* 21.9*   < > 19.6*   PLATELETS K/uL 89* 87* 77*   < > 106*   DIFF TYPE   --   --   --   --  Manu   NEUTROS PCT MAN %  --   --   --   --  86   LYMPHO PCT MAN %  --   --   --   --  4   MONO PCT MAN %  --   --   --   --  2   EOSINO PCT MAN %  --   --   --   --  2   BASOS PCT MAN %  --   --   --   --  2   BANDS PCT MAN %  --   --   --   --  4   SEGS ABS MAN K/uL  --   --   --   --  3.96   LYMPHO ABS MAN K/uL  --   --   --   --  0.18*   MONO ABS MAN K/uL  --   --   --   --  0.09*   EOS ABS MAN K/uL  --   --   --   --  0.09    BASOS ABS MAN K/uL  --   --   --   --  0.09    < > = values in this interval not displayed.         Imaging  Reviewed.    ASSESSMENT AND PLAN      Assessment & Plan  #Ostomy Bleeding  Patient presents with recurrent ostomy bleeding and significant hypotension and CTA with active extravasation overnight. GI consulted for bleeding. Greatest suspicion for recurrent diverticular bleeding, which is low yield to be managed endoscopically. Patient has now undergone several endoscopies without active bleeding via stoma and given his high risk cardiac disease for getting anesthesia, we would not recommend putting him through further endoscopic procedures unless there was felt to be therapeutic benefit. Though technically flexible sigmoidoscopy could be completed without anesthesia, it would be theoretically low yield and purely diagnostic not therapeutic.   -Would favor monitoring clinically and transfusing PRN  -Monitor Hgb, transfuse <7  -Recommend surgical consult given he has had recurrent bleeding from stoma, consideration of revision.       GI will sign off.    Please see attending attestation for final recommendations.    Vern Campbell MD  Gastroenterology Fellow PGY-4  Pager #[phone]       VTE Assessment: [place]    Code Status: DNR (A.N.D.)  Estimated discharge date: 2/6/2025

## 2025-02-06 NOTE — PLAN OF CARE
Problem: Adult Inpatient Plan of Care  Goal: Plan of Care Review  Outcome: Progressing  Flowsheets (Taken 2/6/2025 0144)  Outcome Evaluation: Received at 2000 awake in bed with no c/o, found to have multiple blood clots in brief, ostomy with no evidence of blood, brown stool noted in bag, pt changed multiple times with blood and clots from rectum, pts vitals remained stable, Dr Haque in to assess patient, CBC and lactate sent, abdominal tap site intact, abdomen soft denied pain. Report to next shift RN at 2300.   Plan of Care Review  Outcome Evaluation: Received at 2000 awake in bed with no c/o, found to have multiple blood clots in brief, ostomy with no evidence of blood, brown stool noted in bag, pt changed multiple times with blood and clots from rectum, pts vitals remained stable, Dr Haque in to assess patient, CBC and lactate sent, abdominal tap site intact, abdomen soft denied pain. Report to next shift RN at 2300.

## 2025-02-06 NOTE — PROGRESS NOTES
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  Flex sig on 10/23 via rectum with blood clots throughout rectum and sigmoid with many diverticula.   11/27 EGD unremarkable. 11/27 flex sig via stoma with moderate amount of brown stool, no fresh or residual blood, diverticulosis.  Presents today with rectal and stomal bleeding and acute hemoglobin drop  S/p 2 units in the ED    Rectal/stomal bleeding likely diverticular bleed secondary to known diverticulosis, which tend to self resolve. Lower concern for UGIB given recent scopes unremarkable; in addition ETT for EGD would be extremely difficult given history of torrential TR.    - GIB overnight 2/6--2 episodes of BRBPR, with no blood in ostomy. Received 2 units blood. Discuss with IR TODAY about treatment (bleed in rectosigmoid stump, likely accessible to IR but with concerning anatomy)--family wanted to defer to day team to determine treatment   ----NPO pending future plans   - GI recommending Gen Surg consult for possible stoma replacement to also treat future bleeding   - palliative c/s and GOC conversation   - Obtain stat CTA for large volume bloody BM  - Support with PRBC for Hgb goal >8 given history of CAD  - Maintain 2 large-bore IVs  - CLD for now  - Palliative care consult    Acute on chronic anemia  Present with significant rectal bleeding  BP 80s/50s, not tachycardic although he is maintained on a beta-blocker  Hgb 6.0 with , from baseline 8  Folate from 12/2024 wnl, no B12  on chart review. Iron studies from 12/24 within normal limits.  Maintained on iron supplements  Vit B12 (2/4) WNL    Blood loss anemia in the setting of active GIB    - s/p 2 unts PRBC overnight 2/6  - Transfuse PRBC for goal Hgb >8 given history of CAD  - F/u hemolysis labs, smear  - See remaining plan as per rectal bleed problem  HFrEF (heart failure with reduced ejection fraction) (CMS/[facility])  Follows with Dr. Jeffrey at [facility]  TTE 09/2024 EF 40 to 45%, abnormal septal motion due to RV volume overload, unable to assess diastolic function. 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  Dry weight roughly 164 LBS, presenting on RA and appears euvolemic  CHF has been complicated by pleural effusions, has considered Pleurx and pleurodesis in the past    - Hold diuretic beta-blocker in the setting of active GIB  - Resume as able  - Daily weights, strict I's/O  Recurrent pleural effusion  History of recurrent pleural effusion  Most recent diagnostic Thora was done in 09/2024 with lights criteria consistent with transudative effusion, however of note Thora during 07/2024 admission was exudative  Now gets therapeutic thoracenteses most recently on 1/16 with removal of 800 mL serosanguineous fluid  Was scheduled for OP thoracentesis on 2/6 by IR    - S/p IR 2/5 w/ 800 cc clear hilario fluid removed and large effusion remaining.   - F/u CXR s/p thoracentesis  Atrial fibrillation (CMS/[facility])  History of A-fib follows with Dr. Jeffrey  Maintained on metoprolol, previously on Xarelto, now discontinued  Anticoagulation has been complicated by recurrent GIB  [id number] score 5    - Hold any AC, BB in the setting of active GIB  - Patient has declined NISH closure procedure in the past  CAD (coronary artery disease)  History of CABG in 1996  No  recent stress test or cath available on file  Maintained on ASA, does not appear to be on statin    - Holding ASA for now  History of anxiety  Maintained on escitalopram 10 mg daily  EKG with QTc 539    - Hold SSRI due to risk of prolonged Qtc and GI bleed risk  Hx of chronic kidney disease  History of CKD  Baseline creatinine 1.3-1.5  Presenting with baseline renal function    - Monitor for CALI with hypovolemia 2/2 blood loss  - Trend on daily BMP  Thrombocytopenia (CMS/HCC)  History of chronic thrombocytopenia  Presenting with  from baseline 80s to 90s    -Trend on TID CBC  Palliative care by specialist    Debility      SUBJECTIVE   This is a 89 y.o. year-old male admitted on 2/4/2025 with Rectal bleeding [K62.5].    Interval History: Ovenight, patient had two episodes of BRBPR, requiring 2 units of blood. CT showed active hemorrhage in rectosigmoid stump. Patient was hypotensive to 80s/50 and was dizzy + lightheaded. IR said they are OK to access and drain, but are worried about possible damage to local structures and nicking an artery. Family preferred to defer to day team before pursuing treatment.      OBJECTIVE   Vital signs in last 24 hours:  Temp:  [36.3 °C (97.3 °F)-36.7 °C (98 °F)] 36.3 °C (97.3 °F)  Heart Rate:  [49-85] 81  Resp:  [16-20] 18  BP: ()/(50-55) 92/51  SpO2:  [96 %-100 %] 96 %  Oxygen Therapy: None (Room air)    Weight & I/Os:  Weights (last 5 days)       Date/Time Weight    02/05/25 1230 72.1 kg (158 lb 15.2 oz)            Intake/Output Summary (Last 24 hours) at 2/6/2025 0659  Last data filed at 2/6/2025 0215  24 Hour Net Input/Output from 7AM Yesterday   Intake 548 ml   Output 950 ml   Net -402 ml        PHYSICAL EXAMINATION   Physical Exam  Constitutional:       Appearance: Normal appearance. He is not ill-appearing.   HENT:      Mouth/Throat:      Mouth: Mucous membranes are dry.      Pharynx: Oropharynx is clear.   Eyes:      Conjunctiva/sclera: Conjunctivae normal.    Cardiovascular:      Rate and Rhythm: Normal rate and regular rhythm.      Pulses: Normal pulses.      Heart sounds: Murmur heard.   Pulmonary:      Effort: Pulmonary effort is normal. No respiratory distress.      Breath sounds: Normal breath sounds.   Abdominal:      General: There is no distension.      Palpations: Abdomen is soft.      Tenderness: There is no abdominal tenderness.      Comments: Stoma is clear with dark brown stool present    Musculoskeletal:      Right lower leg: No edema.      Left lower leg: No edema.   Neurological:      Mental Status: He is alert.          LINES, CATHETERS, DRAINS, AIRWAYS, & WOUNDS   Lines, drains, airways, & wounds:  Peripheral IV (Adult) 02/04/25 Anterior;Left Forearm (Active)   Number of days: 2       Peripheral IV (Adult) 02/04/25 Left Antecubital (Active)   Number of days: 2       Colostomy Other (comment) LUQ (Active)   Number of days: 918        LABS, IMAGING, & TELE   Labs:  I have reviewed the patient's pertinent labs.  Significant abnormals are lactate 2.3, --> 1.2, Hb 6.9-->7.8, HCT 21.2, Cr 1.4, PLT 89.    Results from last 7 days   Lab Units 02/06/25  0613 02/06/25  0330 02/05/25  2256   WBC K/uL 5.31 4.87 5.38   HEMOGLOBIN g/dL 7.8* 6.9* 7.0*   HEMATOCRIT % 24.3* 21.2* 21.9*   PLATELETS K/uL 89* 87* 77*       Results from last 7 days   Lab Units 02/06/25  0613 02/05/25  0625 02/04/25  1535   SODIUM mEQ/L 141 141 141   POTASSIUM mEQ/L 3.9 3.9 4.3   CHLORIDE mEQ/L 109* 112* 109*   CO2 mEQ/L 24 26 23   BUN mg/dL 25 25 27*   CREATININE mg/dL 1.4* 1.4* 1.5*   CALCIUM mg/dL 7.1* 7.2* 7.3*   ALBUMIN g/dL  --   --  2.4*   BILIRUBIN TOTAL mg/dL  --   --  0.7   ALK PHOS IU/L  --   --  64   ALT IU/L  --   --  4*   AST IU/L  --   --  24   GLUCOSE mg/dL 93 100* 158*       Imaging personally reviewed (does not include unread studies):  IR THORACENTESIS    Result Date: 2/5/2025  CLINICAL HISTORY: Symptomatic pleural effusion. COMMENT: The patient was referred for  ultrasound-guided thoracentesis on the left side.  Informed consent was obtained.  With the patient laying right lateral decubitus, ultrasound imaging was performed and a large size pleural effusion was identified.  The fluid was localized and a site was selected on the skin with ultrasound.  The site was marked.  Using sterile technique, under local anesthesia, a 4-Albanian valved Yueh catheter was inserted into the pleural space. 800 mL of clear hilario fluid were removed.  The catheter was removed and a sterile dressing was placed over the catheter insertion site. A large pleural effusion remained post procedure.  This procedure was discontinued prior to complete evacuation of the effusion because of patient discomfort and patient request.  The fluid was discarded.  The patient tolerated the procedure well. An erect frontal chest film was obtained immediately following the procedure and will be separately reported. This service rendered by Betsy Benitez PA-C     IMPRESSION: Successful ultrasound-guided left thoracentesis with 800 mL removed and discarded. I certify that I have personally reviewed this examination and agree with Betsy Benitez's report. Gigi Berry M.D.    X-RAY CHEST 1 VIEW    Result Date: 2/5/2025  CLINICAL HISTORY: s/p left thora COMMENT: Technique: A portable frontal view of the chest was obtained. Comparison: 1/16/2025 Slightly decreased moderate to large left pleural effusion. Slightly decreased moderate right pleural effusion. Associated bibasilar airspace opacities. No pneumothorax. Unchanged cardiomegaly. Sternotomy wires are present.     IMPRESSION: Decreased pleural effusions. No pneumothorax.      Telemetry/EKG:  I have independently reviewed the telemetry. Significant findings include tachycardia and PVCs vs brief runs of Vtach .     VTE Assessment: Padua    VTE Prophylaxis: Current anticoagulants:    None      Code Status: DNR (A.N.D.)  Estimated discharge date:   2/8/2025     ATTENDING  DOCUMENTATION  ALSO SEE ATTENDING ATTESTATION SECTION OF NOTE

## 2025-02-06 NOTE — PLAN OF CARE
Care Coordination Admission Assessment Note    General Information:  Readmission Within the last 30 days: no previous admission in last 30 days  Does patient have a : No  Patient-Specific Goals (include timeframe):      Living Arrangements:  Arrived From: home  Current Living Arrangements: home  People in Home: other (see comments)  Home Accessibility: ramp to enter home  Living Arrangement Comments: Lives with a 24hr caregiver Melinda in a 2Sh w/ ramp entrance and 1st Fl set up.    Housing Stability and Utility Access (SDOH):  In the last 12 months, was there a time when you were not able to pay the mortgage or rent on time?: No  In the past 12 months, how many times have you moved?: 0  At any time in the past 12 months, were you homeless or living in a shelter (including now)?: No  In the past 12 months has the electric, gas, oil, or water company threatened to shut off services in your home?: No    Functional Status Prior to Admission:   Assistive Device/Animal Currently Used at Home: hospital bed, walker, front-wheeled, ramps, wheelchair, scale  Functional Status Comments: Has 24-hr caregiver.  IADL Comments: Dependent in ADL's. Non-ambulatory. WC Bound.     Supports and Services:  Current Outpatient/Agency/Support Group: homecare agency  Type of Current Home Care Services: nursing  History of home care episode or rehab stay: Open with [facility]-RN(ostomy/CHF)    Discharge Needs Assessment:   Concerns to be Addressed: care coordination/care conferences, discharge planning  Current Discharge Risk: chronically ill, dependent with mobility/activities of daily living  Anticipated Changes Related to Illness: inability to care for self    Patient/Family Anticipated Discharge Plan:  Patient/Family Anticipates Transition To: home with family, home with help/services  Patient/Family Anticipated Services at Transition: home health care    Connection to Community  Not applicable    Patient Choice:   Offered/Gave  Vendor List: yes (Open with [facility].)  Patient and/or patient guardian/advocate was made aware of their right to choose a provider. A list of eligible providers was presented and reviewed with the patient and/or patient guardian/advocate in written and/or verbal form. The list delineates providers in the patient’s desired geographic area who are participating in the Medicare program and/or providers contracted with the patient’s primary insurance. The Medicare list and quality ratings were obtained from the Medicare.gov [medicare.gov] website.    Anticipated Discharge Plan:  Met with patient. Provided education and contact information for Care Coordination services.: yes  Anticipated Discharge Disposition: home with home health, home with assistance  Type of Home Care Services: nursing    Transportation Needs (SDOH):  Transportation Concerns: none  Transportation Anticipated: family or friend will provide  Is Out of Hospital DNR needed at discharge?: yes    In the past 12 months, has lack of transportation kept you from medical appointments or from getting medications?: No  In the past 12 months, has lack of transportation kept you from meetings, work, or from getting things needed for daily living?: No    Concerns - comments: Pt is a 89y/o M adm w/ Rectal bleeding. Hx of ESBL and CHF(has a scale). YAHIR 2-6/2-7 pending progress. Spoke with son-Marko(POA) to complete CMA. Pt lives with a 24hr caregiver, Melinda, in a 2Sh w/ ramp entrance and 1st Fl set up(has hosp bed and WC). He is dependent in ADL's. Non ambulatory. WC bound at baseline. He is open with [facility]-RN(CHF/ostomy). Son states that he spoke with palliative care NP and he is considering pall NP at home. He would like to hold off on a referral to Pall NP program and will discuss with his family first. Son verbalized how to reach out to CM if he decides he wants it added to his HC referral. Son Marko will provide transport home once DC determined. Verified: PCP,  Pharmacy, Insurance, Address, and Emerg contacts on file with son. CM SW will continue to offer support and follow for needs until Dc complete.

## 2025-02-06 NOTE — PROGRESS NOTES
"10:40 PM  Notified of bloody clots and bloody bowel movement. Went to eval at bedside.  Blood pressure 108/54, Heart Rate 83, Afebrile    Patient was awake and alert, laying comfortably in bed. He states he does feel tired but is not in any pain or have any dizziness, pain, abdominal pain or tenderness. Good mentation, heart regular rate and rhythm, lungs clear to auscultation, abdomen nontender, no edema, mucus membranes pale.  A&P  GI bleed Check and lactate and CBC for hgb and signs of active bleeding. Will transfuse for hgb of less than 7. If patient were to develop tachycardia or hypotension then would discuss with patient and family about CTA and if patient would be safe for scoping.      11:34PM  Hgb of 7 on CBC  Ordered 1 RBC unit transfusion      1:20am  Notified of another large bloody BM rectum (media tab), BP drop to 85/50, patient symtomatic  1 unit blood transfusion being given  Discussed with upper level resident  Called family to discuss need for CT AP, patient family concerned as prior CT and prior scopes did not yield result, but agreeable after explanation of potential benefit of localizing the bleed    Plan:  Ordered CT AP with and without STAT  Continue blood transfusion  Paged Gastroenterology    3:25 AM   Notified Faxed resulted of CT AP of \"suggestive active hemorrhage in rectosigmoid stump\"   Blood pressure improved 94/53 after transfusion    Plan:   Recheck post transfusion CBC  Contacted IR  Prepared additional unit of pRBC  "

## 2025-02-06 NOTE — ASSESSMENT & PLAN NOTE
Present with significant rectal bleeding  BP 80s/50s, not tachycardic although he is maintained on a beta-blocker  Hgb 6.0 with , from baseline 8  Folate from 12/2024 wnl, no B12 on chart review. Iron studies from 12/24 within normal limits.  Maintained on iron supplements  Vit B12 (2/4) WNL    Blood loss anemia in the setting of active GIB    - s/p 2 unts PRBC overnight 2/6, responded well to Hb 8.6  - Transfuse PRBC for goal Hgb >8 given history of CAD  - F/u hemolysis labs, smear  - See remaining plan as per rectal bleed problem

## 2025-02-07 LAB
ANION GAP SERPL CALC-SCNC: 7 MEQ/L (ref 3–15)
BUN SERPL-MCNC: 22 MG/DL (ref 7–25)
CA-I BLD-SCNC: 1.03 MMOL/L (ref 1.15–1.27)
CALCIUM SERPL-MCNC: 6.5 MG/DL (ref 8.6–10.3)
CHLORIDE SERPL-SCNC: 113 MEQ/L (ref 98–107)
CO2 SERPL-SCNC: 20 MEQ/L (ref 21–31)
CREAT SERPL-MCNC: 1.3 MG/DL (ref 0.7–1.3)
CROSSMATCH: NORMAL
CROSSMATCH: NORMAL
EGFRCR SERPLBLD CKD-EPI 2021: 51.2 ML/MIN/1.73M*2
ERYTHROCYTE [DISTWIDTH] IN BLOOD BY AUTOMATED COUNT: 21.2 % (ref 11.6–14.4)
ERYTHROCYTE [DISTWIDTH] IN BLOOD BY AUTOMATED COUNT: 21.2 % (ref 11.6–14.4)
GLUCOSE SERPL-MCNC: 74 MG/DL (ref 70–99)
HCT VFR BLD AUTO: 24 % (ref 40.1–51)
HCT VFR BLD AUTO: 24.8 % (ref 40.1–51)
HGB BLD-MCNC: 7.9 G/DL (ref 13.7–17.5)
HGB BLD-MCNC: 7.9 G/DL (ref 13.7–17.5)
ISBT CODE: 5100
ISBT CODE: 5100
MAGNESIUM SERPL-MCNC: 1.8 MG/DL (ref 1.8–2.5)
MCH RBC QN AUTO: 30.3 PG (ref 28–33.2)
MCH RBC QN AUTO: 30.7 PG (ref 28–33.2)
MCHC RBC AUTO-ENTMCNC: 31.9 G/DL (ref 32.2–36.5)
MCHC RBC AUTO-ENTMCNC: 32.9 G/DL (ref 32.2–36.5)
MCV RBC AUTO: 92 FL (ref 83–98)
MCV RBC AUTO: 96.5 FL (ref 83–98)
PLATELET # BLD AUTO: 106 K/UL (ref 150–350)
PLATELET # BLD AUTO: 79 K/UL (ref 150–350)
PMV BLD AUTO: 10 FL (ref 9.4–12.4)
PMV BLD AUTO: 10.5 FL (ref 9.4–12.4)
POTASSIUM SERPL-SCNC: 3.7 MEQ/L (ref 3.5–5.1)
PRODUCT CODE: NORMAL
PRODUCT CODE: NORMAL
PRODUCT STATUS: NORMAL
PRODUCT STATUS: NORMAL
RBC # BLD AUTO: 2.57 M/UL (ref 4.5–5.8)
RBC # BLD AUTO: 2.61 M/UL (ref 4.5–5.8)
SODIUM SERPL-SCNC: 140 MEQ/L (ref 136–145)
SPECIMEN EXP DATE BLD: NORMAL
SPECIMEN EXP DATE BLD: NORMAL
UNIT ABO: NORMAL
UNIT ABO: NORMAL
UNIT ID: NORMAL
UNIT ID: NORMAL
UNIT RH: POSITIVE
UNIT RH: POSITIVE
WBC # BLD AUTO: 4.49 K/UL (ref 3.8–10.5)
WBC # BLD AUTO: 5.12 K/UL (ref 3.8–10.5)

## 2025-02-07 PROCEDURE — 82330 ASSAY OF CALCIUM: CPT

## 2025-02-07 PROCEDURE — 85027 COMPLETE CBC AUTOMATED: CPT

## 2025-02-07 PROCEDURE — 21400000 HC ROOM AND CARE CCU/INTERMEDIATE

## 2025-02-07 PROCEDURE — 63600000 HC DRUGS/DETAIL CODE: Mod: JZ

## 2025-02-07 PROCEDURE — 63700000 HC SELF-ADMINISTRABLE DRUG

## 2025-02-07 PROCEDURE — 99233 SBSQ HOSP IP/OBS HIGH 50: CPT | Performed by: STUDENT IN AN ORGANIZED HEALTH CARE EDUCATION/TRAINING PROGRAM

## 2025-02-07 PROCEDURE — 83735 ASSAY OF MAGNESIUM: CPT

## 2025-02-07 PROCEDURE — 36415 COLL VENOUS BLD VENIPUNCTURE: CPT

## 2025-02-07 PROCEDURE — 25800000 HC PHARMACY IV SOLUTIONS

## 2025-02-07 PROCEDURE — 80048 BASIC METABOLIC PNL TOTAL CA: CPT

## 2025-02-07 PROCEDURE — 82310 ASSAY OF CALCIUM: CPT

## 2025-02-07 RX ORDER — POTASSIUM CHLORIDE 20 MEQ/1
20 TABLET, EXTENDED RELEASE ORAL ONCE
Status: COMPLETED | OUTPATIENT
Start: 2025-02-07 | End: 2025-02-07

## 2025-02-07 RX ORDER — CALCIUM GLUCONATE 20 MG/ML
1 INJECTION, SOLUTION INTRAVENOUS ONCE
Status: COMPLETED | OUTPATIENT
Start: 2025-02-07 | End: 2025-02-07

## 2025-02-07 RX ADMIN — METHENAMINE HIPPURATE 1 G: 1 TABLET ORAL at 11:48

## 2025-02-07 RX ADMIN — CALCIUM GLUCONATE 1 G: 20 INJECTION, SOLUTION INTRAVENOUS at 10:48

## 2025-02-07 RX ADMIN — PANTOPRAZOLE SODIUM 40 MG: 40 INJECTION, POWDER, LYOPHILIZED, FOR SOLUTION INTRAVENOUS at 09:29

## 2025-02-07 RX ADMIN — FINASTERIDE 5 MG: 5 TABLET, FILM COATED ORAL at 09:31

## 2025-02-07 RX ADMIN — CALCIUM CARBONATE 200 MG OF ELEMENTAL CALCIUM: 500 TABLET, CHEWABLE ORAL at 09:31

## 2025-02-07 RX ADMIN — MAGNESIUM SULFATE HEPTAHYDRATE 2 G: 40 INJECTION, SOLUTION INTRAVENOUS at 11:51

## 2025-02-07 RX ADMIN — PANTOPRAZOLE SODIUM 40 MG: 40 INJECTION, POWDER, LYOPHILIZED, FOR SOLUTION INTRAVENOUS at 21:09

## 2025-02-07 RX ADMIN — LATANOPROST 1 DROP: 50 SOLUTION OPHTHALMIC at 21:09

## 2025-02-07 RX ADMIN — SODIUM CHLORIDE 125 MG: 9 INJECTION, SOLUTION INTRAVENOUS at 09:35

## 2025-02-07 RX ADMIN — LEVOTHYROXINE SODIUM 100 MCG: 0.1 TABLET ORAL at 09:31

## 2025-02-07 RX ADMIN — Medication 10 MG: at 21:09

## 2025-02-07 RX ADMIN — POTASSIUM CHLORIDE 20 MEQ: 1500 TABLET, EXTENDED RELEASE ORAL at 09:31

## 2025-02-07 RX ADMIN — THIAMINE HCL TAB 100 MG 100 MG: 100 TAB at 09:31

## 2025-02-07 RX ADMIN — CYANOCOBALAMIN TAB 500 MCG 500 MCG: 500 TAB at 09:31

## 2025-02-07 ASSESSMENT — COGNITIVE AND FUNCTIONAL STATUS - GENERAL
STANDING UP FROM CHAIR USING ARMS: 2 - A LOT
MOVING TO AND FROM BED TO CHAIR: 1 - TOTAL
CLIMB 3 TO 5 STEPS WITH RAILING: 1 - TOTAL
WALKING IN HOSPITAL ROOM: 2 - A LOT
CLIMB 3 TO 5 STEPS WITH RAILING: 2 - A LOT
STANDING UP FROM CHAIR USING ARMS: 2 - A LOT
MOVING TO AND FROM BED TO CHAIR: 1 - TOTAL
WALKING IN HOSPITAL ROOM: 2 - A LOT

## 2025-02-07 NOTE — ASSESSMENT & PLAN NOTE
History of A-fib follows with Dr. Jeffrey  Maintained on metoprolol, previously on Xarelto, now discontinued  Anticoagulation has been complicated by recurrent GIB  [id number] score 5    - Hold any AC, BB in the setting of active GIB  - Patient has declined NISH closure procedure in the past

## 2025-02-07 NOTE — PROGRESS NOTES
I had a discussion with the patient and his son at bedside regarding their goals of care and preferences for the options for treating his rectal bleeding and also his stomal bleeding.  We reviewed how he had had multiple episodes of bright red blood from his rectum, as well as having some bleeding from his stoma which were leading to anemia and complications from the GI bleeds. We reviewed the treatment options for this including an embolization with interventional radiology, monitoring with transfusions if he continues to bleed, as well as the suggestion from GI that he possibly consider surgery consult for a stomal revision.  We discussed the risks and benefits of every procedure, including a repeated dissection and rebleed from an interventional radiology procedure, which happened to him in October, as well as the possibility that he could always bleed again even if the procedure is effective.  Mr. Mcbride and his son asked about whether or not the procedure would be guaranteed to stop the bleeding, or if he would be at risk of bleeding during or after the procedure, which I answered both are possible and that while the bleeding could stop after the procedure, there is always a risk that it could happen again or that the procedure could have a complication such as the dissection that happened in October, especially given his complex anatomy and the fragility of some of the vessels in his rectum.  The patient and his son understood these risks and benefits.  The patient and his son did not seem interested in a stomal revision with general surgery, as a surgery was a more extensive procedure than is in line with their goals of care.  However, they were more interested in interventional radiology procedure to treat his rectal bleeding, though wanted some more time to think about this before making a final decision.  They asked if they could think about it overnight and let us know in the morning, which I said of  course they could and we would discuss with IR in the morning pending her decision.  I also confirmed with them that if he does have bleeding tonight, whether or not they would want us to call IR emergently or get a transfusion and monitor him and they confirmed that they would not want us to call IR emergently, but rather would want us to get a blood transfusion and monitor him and talk in the morning.  All of their questions were answered and the patient and his son expressed understanding and gratitude.    Nathaly Duran MD, PGY1  Pager #[phone]

## 2025-02-07 NOTE — PLAN OF CARE
Problem: Adult Inpatient Plan of Care  Goal: Plan of Care Review  Outcome: Progressing  Flowsheets (Taken 2/7/2025 1323)  Progress: improving  Outcome Evaluation: Received at 2000 with son Watson at side Dr Davila in to speak with them, pt and son expressed gratitude for the conversation verbalized understanding of plan of care, pt w/out any bloody stools this shift. VSS  labs noted, ostomy working no blood noted.  fsp maintained  currently on rounds resting quietly  Plan of Care Reviewed With:   patient   child   Plan of Care Review  Plan of Care Reviewed With: patient, child  Progress: improving  Outcome Evaluation: Received at 2000 with son Watson at side Dr Davila in to speak with them, pt and son expressed gratitude for the conversation verbalized understanding of plan of care, pt w/out any bloody stools this shift. VSS  labs noted, ostomy working no blood noted.  fsp maintained  currently on rounds resting quietly

## 2025-02-07 NOTE — PROGRESS NOTES
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  Flex sig on 10/23 via rectum with blood clots throughout rectum and sigmoid with many diverticula.   11/27 EGD unremarkable. 11/27 flex sig via stoma with moderate amount of brown stool, no fresh or residual blood, diverticulosis.  Presents today with rectal and stomal bleeding and acute hemoglobin drop  S/p 2 units in the ED    Rectal/stomal bleeding likely diverticular bleed secondary to known diverticulosis, which tend to self resolve. Lower concern for UGIB given recent scopes unremarkable; in addition ETT for EGD would be extremely difficult given history of torrential TR.    - IR likely able to embolize bleed in rectosigmoid stump, though tenuous anatomy and fragile vessels complicate procedure, family currently deciding on proceeding.   - GI recommending Gen Surg consult for possible stoma replacement to also treat future bleeding, but family not interested   - palliative c/s and GOC conversation: family wants internvention that balances efficacy with risk. Not interested in stoma revision, but are considering IR embolization.   - Obtain stat CTA for large volume bloody BM  - Support with PRBC for Hgb goal >8 given history of CAD  - Maintain 2 large-bore IVs  - CLD for now  - Palliative care consult    Acute on chronic anemia  Present with significant rectal bleeding  BP 80s/50s, not tachycardic although he is maintained on a beta-blocker  Hgb 6.0 with , from baseline 8  Folate from  12/2024 wnl, no B12 on chart review. Iron studies from 12/24 within normal limits.  Maintained on iron supplements  Vit B12 (2/4) WNL    Blood loss anemia in the setting of active GIB    - s/p 2 unts PRBC overnight 2/6, responded well to Hb 8.6  - Transfuse PRBC for goal Hgb >8 given history of CAD  - F/u hemolysis labs, smear  - See remaining plan as per rectal bleed problem  HFrEF (heart failure with reduced ejection fraction) (CMS/[facility])  Follows with Dr. Jeffrey at [facility]  TTE 09/2024 EF 40 to 45%, abnormal septal motion due to RV volume overload, unable to assess diastolic function. 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  Dry weight roughly 164 LBS, presenting on RA and appears euvolemic  CHF has been complicated by pleural effusions, has considered Pleurx and pleurodesis in the past    - Hold diuretic beta-blocker in the setting of active GIB  - Resume as able  - Daily weights, strict I's/O  Recurrent pleural effusion  History of recurrent pleural effusion  Most recent diagnostic Thora was done in 09/2024 with lights criteria consistent with transudative effusion, however of note Thora during 07/2024 admission was exudative  Now gets therapeutic thoracenteses most recently on 1/16 with removal of 800 mL serosanguineous fluid  Was scheduled for OP thoracentesis on 2/6 by IR    - S/p IR 2/5 w/ 800 cc clear hilario fluid removed and large effusion remaining.   - F/u CXR s/p thoracentesis  Atrial fibrillation (CMS/HCC)  History of A-fib follows with Dr. Jeffrey  Maintained on metoprolol, previously on Xarelto, now discontinued  Anticoagulation has been complicated by recurrent GIB  [id number] score 5    - Hold any AC, BB in the setting of active GIB  - Patient has declined NISH closure procedure in the past  CAD (coronary  artery disease)  History of CABG in 1996  No recent stress test or cath available on file  Maintained on ASA, does not appear to be on statin    - Holding ASA for now  History of anxiety  Maintained on escitalopram 10 mg daily  EKG with QTc 539    - Hold SSRI due to risk of prolonged Qtc and GI bleed risk  Hx of chronic kidney disease  History of CKD  Baseline creatinine 1.3-1.5  Presenting with baseline renal function    - Monitor for CALI with hypovolemia 2/2 blood loss  - Trend on daily BMP  Thrombocytopenia (CMS/HCC)  History of chronic thrombocytopenia  Presenting with  from baseline 80s to 90s    -Trend on TID CBC  Palliative care by specialist    Debility      SUBJECTIVE   This is a 89 y.o. year-old male admitted on 2/4/2025 with Rectal bleeding [K62.5].    Interval History: GOC discussion with patient and son 2/6, still deciding on treatment with IR.      OBJECTIVE   Vital signs in last 24 hours:  Temp:  [36.3 °C (97.3 °F)-36.7 °C (98 °F)] 36.6 °C (97.9 °F)  Heart Rate:  [71-84] 80  Resp:  [18-20] 18  BP: ()/(51-62) 109/62  SpO2:  [95 %-98 %] 97 %  Oxygen Therapy: None (Room air)    Weight & I/Os:  Weights (last 5 days)       Date/Time Weight    02/05/25 1230 72.1 kg (158 lb 15.2 oz)            Intake/Output Summary (Last 24 hours) at 2/6/2025 0659  Last data filed at 2/6/2025 0215  24 Hour Net Input/Output from 7AM Yesterday   Intake 548 ml   Output 950 ml   Net -402 ml        PHYSICAL EXAMINATION   Physical Exam  Constitutional:       Appearance: Normal appearance.      Comments: Sleeping in bed but easily arousable    Eyes:      Conjunctiva/sclera: Conjunctivae normal.   Cardiovascular:      Rate and Rhythm: Normal rate and regular rhythm.      Pulses: Normal pulses.      Heart sounds: Murmur heard.   Pulmonary:      Effort: No respiratory distress.   Abdominal:      Palpations: Abdomen is soft.      Tenderness: There is no abdominal tenderness.      Comments: Stoma with dark brown output    Musculoskeletal:      Right lower leg: No edema.      Left lower leg: No edema.          LINES, CATHETERS, DRAINS, AIRWAYS, & WOUNDS   Lines, drains, airways, & wounds:  Peripheral IV (Adult) 02/04/25 Anterior;Left Forearm (Active)   Number of days: 3       Peripheral IV (Adult) 02/04/25 Left Antecubital (Active)   Number of days: 3       Colostomy Other (comment) LUQ (Active)   Number of days: 919        LABS, IMAGING, & TELE   Labs:  Labs are pending.    Results from last 7 days   Lab Units 02/06/25  1827 02/06/25  1231 02/06/25  0613   WBC K/uL 4.98 5.13 5.31   HEMOGLOBIN g/dL 8.1* 8.5* 7.8*   HEMATOCRIT % 24.7* 25.7* 24.3*   PLATELETS K/uL 103* 89* 89*       Results from last 7 days   Lab Units 02/06/25  0613 02/05/25  0625 02/04/25  1535   SODIUM mEQ/L 141 141 141   POTASSIUM mEQ/L 3.9 3.9 4.3   CHLORIDE mEQ/L 109* 112* 109*   CO2 mEQ/L 24 26 23   BUN mg/dL 25 25 27*   CREATININE mg/dL 1.4* 1.4* 1.5*   CALCIUM mg/dL 7.1* 7.2* 7.3*   ALBUMIN g/dL  --   --  2.4*   BILIRUBIN TOTAL mg/dL  --   --  0.7   ALK PHOS IU/L  --   --  64   ALT IU/L  --   --  4*   AST IU/L  --   --  24   GLUCOSE mg/dL 93 100* 158*       Imaging personally reviewed (does not include unread studies):  CT ANGIOGRAPHY ABDOMEN PELVIS WITH AND WITHOUT IV CONTRAST    Result Date: 2/6/2025  CLINICAL HISTORY: Lower gastrointestinal bleeding TECHNIQUE: CT examination of the abdomen and pelvis was performed before administration of intravenous contrast. Subsequently, CT of the abdomen and pelvis was performed after administration of intravenous contrast according to gastrointestinal bleeding protocol. Post-contrast images were obtained during arterial and portal venous phases. CT was performed with the patient in the supine position. Images reconstructed/reformatted in the axial, coronal and sagittal planes. 3-D RECONSTRUCTION: Additional 3-D/MIP reconstructions were performed and reviewed. CT DOSE:  One or more dose reduction techniques (e.g.  automated exposure control, adjustment of the mA and/or kV according to patient size, use of iterative reconstruction technique) utilized for this examination. ORAL CONTRAST: None INTRAVENOUS CONTRAST DOSE: 100mL of iopamidoL (ISOVUE-370) 370 mg iodine /mL (76 %) injection 100 mL COMPARISON: None. COMMENT: BOWEL: There is contrast extravasation in the proximal sigmoid colon indicative of active bleeding within the proximal sigmoid colon of the rectosigmoid stump. Circumferential wall thickening, mild fat stranding along the distal sigmoid colon and rectum.  There is left mid abdominal descending colostomy.  Diffuse colonic diverticula. Small hiatal hernia. LIVER: The liver is normal in size and configuration. No suspicious mass. BILIARY TREE: No intrahepatic or extrahepatic bile duct dilation. GALLBLADDER: Cholecystectomy. PANCREAS: Atrophic. SPLEEN: Unremarkable. ADRENAL GLANDS: Unremarkable. KIDNEYS, URETERS: No hydronephrosis.  Symmetric nephrograms.  Multiple bilateral renal cysts. Nonobstructing 2 mm right renal pelvis calculus. PERITONEUM:  No fluid collection, ascites, or pneumoperitoneum. LYMPH NODES: No abdominal or pelvic lymphadenopathy. REPRODUCTIVE ORGANS: Enlarged prostate gland. BLADDER: Mildly distended, thick walled, 1 cm and smaller bladder diverticula. VESSELS: Diffuse calcified atherosclerosis of the abdominal aorta and branch vessels.  Patent celiac artery, SMA, renal arteries, MARIE.  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 BONES: Diffuse osseous demineralization.  Chronic severe compression fractures at L1 and L4. SOFT TISSUES: Mild subcutaneous body wall edema. LOWER THORAX: Partially imaged cardiomegaly.  Reflux of contrast into the IVC and hepatic veins indicative of right-sided heart dysfunction.  The partially imaged at least moderate pleural effusions.     IMPRESSION: 1.  Contrast extravasation/active bleeding in the proximal sigmoid colon within the rectosigmoid  stump.  Circumferential wall thickening of the distal sigmoid colon and rectum with fat stranding, appearance of some degree of acute inflammation. 2.  Left mid descending colostomy.  Diffuse colonic diverticula. 3.  Partially imaged cardiomegaly, findings of right-sided heart dysfunction, pleural effusions, body wall edema. 4.  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 Report agrees with preliminary radiologist report. Interpreted by: Janee Lovell MD, Feb 06, 2025 02:45 AM Interpreted by: Janee Lovell MD, Feb 06, 2025 02:49 AM Discussed with: Nurse Loving, who confirmed to Ass SBD relaying report to Dr at 02:47 AM and the results were critically read back     Telemetry/EKG:  I have independently reviewed the telemetry. No events for the last 24 hours.     VTE Assessment: Padua    VTE Prophylaxis: Current anticoagulants:    None      Code Status: DNR (A.N.D.)  Estimated discharge date: 2/8/2025     ATTENDING DOCUMENTATION  ALSO SEE ATTENDING ATTESTATION SECTION OF NOTE

## 2025-02-07 NOTE — PROGRESS NOTES
Fr. Meza provided sacrament of Anointing of the Sick to [facility] patient. - Charted by Rev. Tres Dwyer, Spiritual Care Coordinator, [id number]

## 2025-02-07 NOTE — ASSESSMENT & PLAN NOTE
Extensive GI history notable for recurrent GIB s/p colostomy with rectal stump (transverse colon was divided)  S/p IR performed successful subselective SMA and MARIE arteriography 10/2024  Most recently admitted on 12/30 for stomal bleed felt to be diverticular in etiology vs stomal irritation managed with PRBC and no invasive interventions  Most recent scopes: Flex sig on 10/18 via stoma with intact stoma, diverticulosis, no active bleeding.  Flex sig on 10/23 via rectum with blood clots throughout rectum and sigmoid with many diverticula.   11/27 EGD unremarkable. 11/27 flex sig via stoma with moderate amount of brown stool, no fresh or residual blood, diverticulosis.  Presents today with rectal and stomal bleeding and acute hemoglobin drop  S/p 2 units in the ED    Rectal/stomal bleeding likely diverticular bleed secondary to known diverticulosis, which tend to self resolve. Lower concern for UGIB given recent scopes unremarkable; in addition ETT for EGD would be extremely difficult given history of torrential TR.    - IR likely able to embolize bleed in rectosigmoid stump, though tenuous anatomy and fragile vessels complicate procedure, family currently deciding on proceeding.   - GI recommending Gen Surg consult for possible stoma replacement to also treat future bleeding, but family not interested   - palliative c/s and GOC conversation: family wants internvention that balances efficacy with risk. Not interested in stoma revision, but are considering IR embolization.   - Obtain stat CTA for large volume bloody BM  - Support with PRBC for Hgb goal >8 given history of CAD  - Maintain 2 large-bore IVs  - CLD for now  - Palliative care consult

## 2025-02-07 NOTE — ASSESSMENT & PLAN NOTE
Follows with Dr. Jeffrey at [facility]  TTE 09/2024 EF 40 to 45%, abnormal septal motion due to RV volume overload, unable to assess diastolic function. 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

## 2025-02-07 NOTE — PROGRESS NOTES
Per info in discharge planning rounds, patient adm w/ rectal bleeding. palliative c/s and GOC conversation on going  Not interested in stoma revision, but are considering IR embolization. CLD for now. YAHIR: 2/8 anticipated dispo: home w/ NP palliative bridge. [facility] following and informed of possible weekend discharge. CM to follow to assist w/ discharge planning until d/c completed.

## 2025-02-08 ENCOUNTER — TRANSCRIBE ORDERS (OUTPATIENT)
Dept: INTERNAL MEDICINE | Facility: HOSPITAL | Age: 89
End: 2025-02-08

## 2025-02-08 VITALS
TEMPERATURE: 97.8 F | OXYGEN SATURATION: 96 % | HEART RATE: 80 BPM | DIASTOLIC BLOOD PRESSURE: 56 MMHG | HEIGHT: 73 IN | WEIGHT: 167.11 LBS | RESPIRATION RATE: 18 BRPM | BODY MASS INDEX: 22.15 KG/M2 | SYSTOLIC BLOOD PRESSURE: 105 MMHG

## 2025-02-08 DIAGNOSIS — D50.0 IRON DEFICIENCY ANEMIA SECONDARY TO BLOOD LOSS (CHRONIC): ICD-10-CM

## 2025-02-08 DIAGNOSIS — I13.0 HYPERTENSIVE HEART AND CHRONIC KIDNEY DISEASE WITH HEART FAILURE AND STAGE 1 THROUGH STAGE 4 CHRONIC KIDNEY DISEASE, OR UNSPECIFIED CHRONIC KIDNEY DISEASE: ICD-10-CM

## 2025-02-08 DIAGNOSIS — K92.2 GASTROINTESTINAL HEMORRHAGE, UNSPECIFIED: Primary | ICD-10-CM

## 2025-02-08 PROBLEM — N18.31 CKD STAGE 3A, GFR 45-59 ML/MIN (CMS/HCC): Status: ACTIVE | Noted: 2025-02-08

## 2025-02-08 PROBLEM — I50.22 CHRONIC HFREF (HEART FAILURE WITH REDUCED EJECTION FRACTION) (CMS/HCC): Status: ACTIVE | Noted: 2025-02-08

## 2025-02-08 LAB
ANION GAP SERPL CALC-SCNC: 7 MEQ/L (ref 3–15)
BACTERIA BLD CULT: NORMAL
BACTERIA BLD CULT: NORMAL
BUN SERPL-MCNC: 25 MG/DL (ref 7–25)
CA-I BLD-SCNC: 1.17 MMOL/L (ref 1.15–1.27)
CALCIUM SERPL-MCNC: 7 MG/DL (ref 8.6–10.3)
CHLORIDE SERPL-SCNC: 108 MEQ/L (ref 98–107)
CO2 SERPL-SCNC: 24 MEQ/L (ref 21–31)
CREAT SERPL-MCNC: 1.3 MG/DL (ref 0.7–1.3)
EGFRCR SERPLBLD CKD-EPI 2021: 51.2 ML/MIN/1.73M*2
ERYTHROCYTE [DISTWIDTH] IN BLOOD BY AUTOMATED COUNT: 21.5 % (ref 11.6–14.4)
GLUCOSE SERPL-MCNC: 75 MG/DL (ref 70–99)
HCT VFR BLD AUTO: 25.1 % (ref 40.1–51)
HGB BLD-MCNC: 7.9 G/DL (ref 13.7–17.5)
MAGNESIUM SERPL-MCNC: 2.1 MG/DL (ref 1.8–2.5)
MCH RBC QN AUTO: 30.2 PG (ref 28–33.2)
MCHC RBC AUTO-ENTMCNC: 31.5 G/DL (ref 32.2–36.5)
MCV RBC AUTO: 95.8 FL (ref 83–98)
PLATELET # BLD AUTO: 92 K/UL (ref 150–350)
PMV BLD AUTO: 9.6 FL (ref 9.4–12.4)
POTASSIUM SERPL-SCNC: 4.4 MEQ/L (ref 3.5–5.1)
RBC # BLD AUTO: 2.62 M/UL (ref 4.5–5.8)
SODIUM SERPL-SCNC: 139 MEQ/L (ref 136–145)
WBC # BLD AUTO: 4.88 K/UL (ref 3.8–10.5)

## 2025-02-08 PROCEDURE — 25800000 HC PHARMACY IV SOLUTIONS

## 2025-02-08 PROCEDURE — 83735 ASSAY OF MAGNESIUM: CPT

## 2025-02-08 PROCEDURE — 63600000 HC DRUGS/DETAIL CODE: Mod: JZ

## 2025-02-08 PROCEDURE — 80048 BASIC METABOLIC PNL TOTAL CA: CPT

## 2025-02-08 PROCEDURE — 85027 COMPLETE CBC AUTOMATED: CPT

## 2025-02-08 PROCEDURE — 63700000 HC SELF-ADMINISTRABLE DRUG

## 2025-02-08 PROCEDURE — 82330 ASSAY OF CALCIUM: CPT

## 2025-02-08 PROCEDURE — 99239 HOSP IP/OBS DSCHRG MGMT >30: CPT | Performed by: HOSPITALIST

## 2025-02-08 PROCEDURE — 36415 COLL VENOUS BLD VENIPUNCTURE: CPT

## 2025-02-08 RX ADMIN — THIAMINE HCL TAB 100 MG 100 MG: 100 TAB at 08:48

## 2025-02-08 RX ADMIN — PANTOPRAZOLE SODIUM 40 MG: 40 INJECTION, POWDER, LYOPHILIZED, FOR SOLUTION INTRAVENOUS at 08:49

## 2025-02-08 RX ADMIN — CYANOCOBALAMIN TAB 500 MCG 500 MCG: 500 TAB at 08:48

## 2025-02-08 RX ADMIN — FINASTERIDE 5 MG: 5 TABLET, FILM COATED ORAL at 08:48

## 2025-02-08 RX ADMIN — CALCIUM CARBONATE 200 MG OF ELEMENTAL CALCIUM: 500 TABLET, CHEWABLE ORAL at 08:49

## 2025-02-08 RX ADMIN — LEVOTHYROXINE SODIUM 100 MCG: 0.1 TABLET ORAL at 05:33

## 2025-02-08 RX ADMIN — SODIUM CHLORIDE 125 MG: 9 INJECTION, SOLUTION INTRAVENOUS at 08:45

## 2025-02-08 NOTE — PLAN OF CARE
Plan of Care Review  Plan of Care Reviewed With: patient  Progress: improving  Outcome Evaluation: AAOx3.  VS  stable. The colonostomy  care provided. The schedule medication given and tolerated.  +large Bm without any blood. No sob or respiratory distress observed. FSP in place. Call light within reach.

## 2025-02-08 NOTE — PLAN OF CARE
Care Coordination Discharge Plan Note     Discharge Needs Assessment  Concerns to be Addressed: care coordination/care conferences, discharge planning  Current Discharge Risk: chronically ill, dependent with mobility/activities of daily living    Anticipated Discharge Plan  Anticipated Discharge Disposition: home with home health, home with assistance  Type of Home Care Services: nursing    Patient Choice  Offered/Gave Vendor List: yes (Open with [facility].)  Patient and/or patient guardian/advocate was made aware of their right to choose a provider. A list of eligible providers was presented and reviewed with the patient and/or patient guardian/advocate in written and/or verbal form. The list delineates providers in the patient’s desired geographic area who are participating in the Medicare program and/or providers contracted with the patient’s primary insurance. The Medicare list and quality ratings were obtained from the Medicare.gov [medicare.gov] website.    -----------------------------------------------------------    Concerns Comments: Per weekend rounds patient stable for dc. CM unable to discuss dc plan, patient discharged. Per CM notes [facility] following, povided update to central intake.    Discharge Plan:   Disposition/Destination: Home Health Care - [facility] / Home  Discharge Facility:    Community Resources:      Discharge Transportation:  Is Out of Hospital DNR needed at Discharge: yes  Does patient need discharge transport?

## 2025-02-08 NOTE — DISCHARGE INSTRUCTIONS
Dear Mr. Elena,    You came to the ED because you were having worsening bleeding from your colostomy and rectal stump. You were admitted to the hospital because you are found to have low blood counts (hemoglobin) requiring blood transfusions.  While you are hospitalized you had 1 episode of active bleeding which was captured on CT imaging.  Our interventional radiology service at that time considered performing a procedure to stop the bleeding however noted it would be a high risk procedure without guaranteeing your GI bleeds would stop.  After discussions between you, your family, and the medical team it was felt best to hold off on further procedures and to monitor you.  By the time of discharge you are blood counts were stable and you were feeling better.    Please bring this discharge paperwork to all of your follow up appointments. If you experience recurrence of your symptoms, please do not hesitate to call your primary care doctor or present to the emergency department for evaluation. It was a pleasure taking care of you at [facility]! We wish you the best of health.     TO DO:  [ ] Please follow up with your PCP within 1 week  [ ] Please follow up with hematology.  You are already scheduled  [ ] If you experience large GI bleeds and are symptomatic please return to the emergency department for further evaluation    MEDICAITONS:  [ ] It is okay to continue your aspirin 81 mg daily  [ ] We recommend holding your escitalopram (Lexapro) until you follow-up with your primary care doctor.  There has been studies associating these types of medications with GI bleeds.  While this medication is not the cause of your medicine it may put you at higher risk of further bleeding  [ ] Continue to take iron supplementation daily at home.  If you experience constipation while on iron pill, you can base it out to every other day or every 3 days/week

## 2025-02-08 NOTE — ASSESSMENT & PLAN NOTE
Follows with Dr. Jeffrey at [facility]  TTE 09/2024 EF 40 to 45%, abnormal septal motion due to RV volume overload, unable to assess diastolic function. 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

## 2025-02-08 NOTE — DISCHARGE SUMMARY
Hospital Medicine    Inpatient Discharge Summary        BRIEF OVERVIEW     Patient: Samy Elena  Admission Date: 2025   : 10/6/1930 Discharge Date: 2025       PCP: Zachery Hernandez MD  Disposition: [facility]    Destination: Home     Attending Provider: Rhoda Herrera MD Attending phys phone: [phone]    Code Status At Discharge: DNR (A.N.D.)    Samy Elena was seen by the palliative care team during this hospitalization.  Please see palliative care team documentation for further details.  Also reference the advance care planning (ACP) website (Advance Care Planning (Advance Directive)  Patient services  [facility] ) to learn more about completing an advance directive, including Health Care Power of  and Living Will documents.        SUMMARY OF HOSPITAL COURSE   Brief Hospital Course  This is a 89 y.o. year-old male admitted on 2025 with Rectal bleeding.    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 In the ED, he was found to have hgb 6.0 on admission. S/p 2 unit pRBC in ED.     Of note, during recent admissions for GI bleed, patient and family were told not candidate for further endoscopic or surgical interventions given recurrent events with high cardiac risk with anesthesia and likely unable to tolerate surgical interventions. Patient and family are with understanding.      #Acute Blood Loss Anemia  #Recurrent GI Bleeding  Patient with extensive GI history notable for recurrent GI bleeds s/p colostomy with rectal stump (transverse colon was divided).  During his  several hospitalizations, it has been felt his GI bleeds have been likely diverticular in etiology versus stomal irritation which have been largely managed with transfusions.  He has had upper endoscopy and colonoscopies without active signs of bleeding.  He presented on current admission with blood via ostomy and rectal stump with finding of hemoglobin 6.0 and otherwise hemodynamically stable.  He received a total of 4 units while admitted.  He had 1 episode of active hemorrhage that was captured on CTA at rectalsigmoid stump.  At that time our IR service reported they are likely able to perform arterial embolization in rectosigmoid stump however noting this is a higher risk procedure given tenuous anatomy and fragile vessels.  Family at that time elected to hold off on procedures.  Extensive goals of care conversations were held and given the patient and family understanding he is not a candidate for further endoscopies as they would likely be purely diagnostic, and not a candidate for surgical procedures given high cardiac risk with anesthesia and likely unable to tolerate surgical interventions, family elected to continue to monitor conservatively and treat with transfusions.  Our palliative care team met with family and patient elected to follow-up with NP palliative care bridge on discharge.    - Follow-up with CBC in 1 week  - Follow-up with PCP in 1 week from discharge  - Follow-up with new hematology appointment (already scheduled) to discuss blood transfusions in order to minimize hospitalizations  - Continue p.o. iron supplementation      #Chronic Heart Failure with Reduced Ejection Faction  #Torrential TR  #Recurrent L pleural effusion iso HF  TTE 09/2024 EF 40 to 45%, abnormal septal motion due to RV volume overload, RV massively dilated with severely decreased systolic function, torrential tricuspid regurgitation. His HF has been complicated by recurrent left sided pleural effusion w/ last  thoracentesis performed on 1/16 w/ 800 cc removed. He has considered Pleurx and pleurodesis in the past. During current hospitalization, patient has been maintained on RA. His metoprolol was held in setting of acute GIB. IR was consulted for repeat thoracentesis as his next scheduled was while inpatient. They removed 800 cc w/ large pleural effusion remaining.      - Continue home Torsemide 10 mg daily  - Continue metoprolol 25 mg twice daily.   - SGLT2 contraindicated 2/2 history of Justin's gangrene  - Given patient's soft BP, unlikely to tolerate additional GDMT      #CAD s/p CABG  Patient with history of CABG in 1996.  He was previously on Plavix and aspirin however Plavix had been discontinued over the last several months due to recurrent GI bleeds.  He has however continued on monotherapy aspirin    -Continue aspirin 81 mg daily      #Afib  History of A-fib follows with Dr. Jeffrey.  Patient maintained on metoprolol.  Previously on AC (Xarelto) which had been discontinued earlier last year in the setting of recurrent GI bleeds.  Of note patient has declined NISH closure procedure in the past    -Continue metoprolol      #CKD3a  Patient with history of CKD 3A with baseline creatinine 1.3-1.5.  Throughout admission patient renal function remained stable.      #Anxiety   Patient maintained at home on escitalopram 10 mg daily.  On EKG on admission QTc 539.  His SSRI was held due to prolonged QTc as well as increased risk for GI bleeds    -Holding escitalopram on discharge.  Patient would benefit from respiration if it discussion with PCP at next follow-up visit        ASSESSMENT AND PLAN     Assessment & Plan  Rectal bleeding  Extensive GI history notable for recurrent GIB s/p colostomy with rectal stump (transverse colon was divided)  S/p IR performed successful subselective SMA and MARIE arteriography 10/2024  Most recently admitted on 12/30 for stomal bleed felt to be diverticular in etiology vs stomal  irritation managed with PRBC and no invasive interventions  Most recent scopes: Flex sig on 10/18 via stoma with intact stoma, diverticulosis, no active bleeding.  Flex sig on 10/23 via rectum with blood clots throughout rectum and sigmoid with many diverticula.   11/27 EGD unremarkable. 11/27 flex sig via stoma with moderate amount of brown stool, no fresh or residual blood, diverticulosis.  Presents today with rectal and stomal bleeding and acute hemoglobin drop  S/p 2 units in the ED    Rectal/stomal bleeding likely diverticular bleed secondary to known diverticulosis, which tend to self resolve. Lower concern for UGIB given recent scopes unremarkable; in addition ETT for EGD would be extremely difficult given history of torrential TR.    - IR likely able to embolize bleed in rectosigmoid stump, though tenuous anatomy and fragile vessels complicate procedure, family currently deciding on proceeding.   - GI recommending Gen Surg consult for possible stoma replacement to also treat future bleeding, but family not interested   - palliative c/s and GOC conversation: family wants internvention that balances efficacy with risk. Not interested in stoma revision, but are considering IR embolization.   - Obtain stat CTA for large volume bloody BM  - Support with PRBC for Hgb goal >8 given history of CAD  - Maintain 2 large-bore IVs  - CLD for now  - Palliative care consult    Acute on chronic anemia  Present with significant rectal bleeding  BP 80s/50s, not tachycardic although he is maintained on a beta-blocker  Hgb 6.0 with , from baseline 8  Folate from 12/2024 wnl, no B12 on chart review. Iron studies from 12/24 within normal limits.  Maintained on iron supplements  Vit B12 (2/4) WNL    Blood loss anemia in the setting of active GIB    - s/p 2 unts PRBC overnight 2/6, responded well to Hb 8.6  - Transfuse PRBC for goal Hgb >8 given history of CAD  - F/u hemolysis labs, smear  - See remaining plan as per rectal  bleed problem  HFrEF (heart failure with reduced ejection fraction) (CMS/[facility])  Follows with Dr. Jeffrey at [facility]  TTE 09/2024 EF 40 to 45%, abnormal septal motion due to RV volume overload, unable to assess diastolic function. 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  Dry weight roughly 164 LBS, presenting on RA and appears euvolemic  CHF has been complicated by pleural effusions, has considered Pleurx and pleurodesis in the past    - Hold diuretic beta-blocker in the setting of active GIB  - Resume as able  - Daily weights, strict I's/O  Recurrent pleural effusion  History of recurrent pleural effusion  Most recent diagnostic Thora was done in 09/2024 with lights criteria consistent with transudative effusion, however of note Thora during 07/2024 admission was exudative  Now gets therapeutic thoracenteses most recently on 1/16 with removal of 800 mL serosanguineous fluid  Was scheduled for OP thoracentesis on 2/6 by IR    - S/p IR 2/5 w/ 800 cc clear hilario fluid removed and large effusion remaining.   - F/u CXR s/p thoracentesis  Atrial fibrillation (CMS/[facility])  History of A-fib follows with Dr. Jeffrey  Maintained on metoprolol, previously on Xarelto, now discontinued  Anticoagulation has been complicated by recurrent GIB  [id number] score 5    - Hold any AC, BB in the setting of active GIB  - Patient has declined NISH closure procedure in the past  CAD (coronary artery disease)  History of CABG in 1996  No recent stress test or cath available on file  Maintained on ASA, does not appear to be on statin    - Holding ASA for now  History of anxiety  Maintained on escitalopram 10 mg daily  EKG with QTc 539    - Hold SSRI due to risk of prolonged Qtc and GI bleed risk  Hx of chronic kidney disease  History of CKD  Baseline  creatinine 1.3-1.5  Presenting with baseline renal function    - Monitor for CALI with hypovolemia 2/2 blood loss  - Trend on daily BMP  Thrombocytopenia (CMS/HCC)  History of chronic thrombocytopenia  Presenting with  from baseline 80s to 90s    -Trend on TID CBC  Palliative care by specialist    Debility        Exam on Day of Discharge  Temp:  [36.4 °C (97.5 °F)-36.7 °C (98 °F)] 36.7 °C (98 °F)  Heart Rate:  [69-82] 77  Resp:  [16-18] 18  BP: (100-109)/(52-63) 103/63  Physical Exam  Vitals reviewed.   Constitutional:       General: He is not in acute distress.     Appearance: He is not ill-appearing.   HENT:      Head: Normocephalic and atraumatic.   Eyes:      General: No scleral icterus.  Cardiovascular:      Rate and Rhythm: Normal rate.      Pulses: Normal pulses.      Heart sounds: Murmur heard.   Pulmonary:      Effort: Pulmonary effort is normal. No respiratory distress.      Breath sounds: Normal breath sounds. No wheezing.   Abdominal:      General: Bowel sounds are normal. There is no distension.      Palpations: Abdomen is soft.      Tenderness: There is no abdominal tenderness. There is no guarding or rebound.      Comments: Brown stool in colostomy bag   Musculoskeletal:      Right lower leg: No edema.      Left lower leg: No edema.   Skin:     General: Skin is warm and dry.   Neurological:      Mental Status: He is alert and oriented to person, place, and time.   Psychiatric:         Mood and Affect: Mood normal.         Behavior: Behavior normal.       DISCHARGE MEDICATIONS         Medication List        CONTINUE taking these medications      aspirin 81 mg enteric coated tablet  Take 81 mg by mouth daily. 9 am  Dose: 81 mg     calcium (as carbonate) 200 mg calcium (500 mg) chewable tablet  Commonly known as: TUMS  Take 1 tablet by mouth daily. 11 am  Dose: 1 tablet     carboxymethylcellulose 0.5 % dropperette  Commonly known as: REFRESH PLUS  Administer 1 drop into both eyes every2 (two) hours  while awake.  Dose: 1 drop     cranberry extract 425 mg capsule  Take 425 mg by mouth daily. 5 pm  Dose: 425 mg     cyanocobalamin 500 mcg tablet  Commonly known as: VITAMIN B12  Take 500 mcg by mouth every morning. 9 am  Dose: 500 mcg     famotidine 10 mg tablet  Commonly known as: PEPCID  Take 10 mg by mouth daily. 11 am  Dose: 10 mg     ferrous sulfate 325 mg (65 mg iron) tablet  Take 65 mg by mouth daily with breakfast. 11 am  Dose: 65 mg     finasteride 5 mg tablet  Commonly known as: PROSCAR  Take 5 mg by mouth daily. 9 am  Dose: 5 mg     levothyroxine 100 mcg tablet  Commonly known as: SYNTHROID  Take 100 mcg by mouth daily before breakfast. 7 am  Dose: 100 mcg     LUMIGAN 0.01 % ophthalmic drops  Administer 1 drop into the left eye nightly.  Dose: 1 drop  Generic drug: bimatoprost     melatonin 10 mg capsule  Take 10 mg by mouth nightly. 9 pm  Dose: 10 mg     methenamine 1 gram tablet  Commonly known as: HIPREX  Take 1 g by mouth daily before lunch. 11 am  Dose: 1 g     metoprolol succinate XL 25 mg 24 hr tablet  Commonly known as: TOPROL-XL  Take 12.5 mg by mouth daily with dinner. 5 pm  Dose: 12.5 mg     omeprazole 20 mg capsule  Commonly known as: PriLOSEC  Take 20 mg by mouth daily. 11 am  Dose: 20 mg     potassium chloride 10 mEq CR tablet  Commonly known as: KLOR-CON  Take 10 mEq by mouth 2 (two) times a day. 9 am and 5 pm  Dose: 10 mEq     tamsulosin 0.4 mg capsule  Commonly known as: FLOMAX  Take 0.4 mg by mouth every morning. 9 am  Dose: 0.4 mg     thiamine HCl 100 mg tablet  Commonly known as: VITAMIN B1  Take 100 mg by mouth every morning. 9 am  Dose: 100 mg     torsemide 10 mg tablet  Commonly known as: DEMADEX  Take 10 mg by mouth every morning. 9 am  Dose: 10 mg            STOP taking these medications      escitalopram 10 mg tablet  Commonly known as: LEXAPRO               INSTRUCTIONS AND FOLLOW-UP     Instructions for after discharge       Call provider for:  difficulty breathing      Call  "provider for:  extreme fatigue      Call provider for:  persistent dizziness or light-headedness, headache, or visual disturbances      Call provider for:  persistent nausea or vomiting      Call provider for:  severe uncontrolled pain      Call provider for:  temperature >100.4      Call provider for: increased shortness of breath; weight gain (3 pounds/1 day or 5 pounds/1 week); more swelling in legs, ankles, feet or belly; need to sleep with extra pillows or in a chair.      Discharge Diet      Diet Type / Texture: Cardiac (Low Sodium, Low Fat)    Normal Activity as Tolerated      Review additional discharge directions in \"Instructions\" section on the last page              Important Issues to Address in Follow-Up  Discharge Follow-up Issues: Instructions to Patient:   Additional Discharge Instructions    Dear Mr. Elena,    You came to the ED because you were having worsening bleeding from your colostomy and rectal stump. You were admitted to the hospital because you are found to have low blood counts (hemoglobin) requiring blood transfusions.  While you are hospitalized you had 1 episode of active bleeding which was captured on CT imaging.  Our interventional radiology service at that time considered performing a procedure to stop the bleeding however noted it would be a high risk procedure without guaranteeing your GI bleeds would stop.  After discussions between you, your family, and the medical team it was felt best to hold off on further procedures and to monitor you.  By the time of discharge you are blood counts were stable and you were feeling better.    Please bring this discharge paperwork to all of your follow up appointments. If you experience recurrence of your symptoms, please do not hesitate to call your primary care doctor or present to the emergency department for evaluation. It was a pleasure taking care of you at [facility]! We wish you the best of health.     TO DO:  [ ] Please " follow up with your PCP within 1 week  [ ] Please follow up with hematology.  You are already scheduled  [ ] If you experience large GI bleeds and are symptomatic please return to the emergency department for further evaluation    MEDICAITONS:  [ ] It is okay to continue your aspirin 81 mg daily  [ ] We recommend holding your escitalopram (Lexapro) until you follow-up with your primary care doctor.  There has been studies associating these types of medications with GI bleeds.  While this medication is not the cause of your medicine it may put you at higher risk of further bleeding  [ ] Continue to take iron supplementation daily at home.  If you experience constipation while on iron pill, you can base it out to every other day or every 3 days/week             Scheduled Appointments            In 1 week Mat Bryant MD [facility]            Recommended Follow-up Visits  Follow-Up After Discharge       [facility]   Specialty: Home Health Services        Follow up    Service: Please call if you do not hear from the agency within 24-48 hrs of Dc.            Test Results Pending at Discharge  Pending Inpatient Labs       Order Current Status    Blood Culture Blood, Venous Preliminary result    Blood Culture Blood, Venous Preliminary result            LABS AND IMAGING   Pertinent Labs  CHEMISTRIES   Results from last 7 days   Lab Units 02/08/25  0550 02/07/25  0638 02/06/25  0613 02/05/25  0625 02/04/25  1535 02/04/25  1535   SODIUM mEQ/L 139 140 141 141  --  141   POTASSIUM mEQ/L 4.4 3.7 3.9 3.9  --  4.3   CHLORIDE mEQ/L 108* 113* 109* 112*  --  109*   CO2 mEQ/L 24 20* 24 26  --  23   BUN mg/dL 25 22 25 25  --  27*   CREATININE mg/dL 1.3 1.3 1.4* 1.4*  --  1.5*   GLUCOSE mg/dL 75 74 93 100*  --  158*   CALCIUM mg/dL 7.0* 6.5* 7.1* 7.2*  --  7.3*   EGFR mL/min/1.73m*2 51.2* 51.2* 46.9* 46.9*  --  43.1*   ANION GAP mEQ/L 7 7 8 3  --  9   MAGNESIUM mg/dL 2.1  1.8 1.9 1.9   < >  --     < > = values in this interval not displayed.     HEPATIC FUNCTION TESTS   Results from last 7 days   Lab Units 02/04/25  1535   AST IU/L 24   ALT IU/L 4*   ALK PHOS IU/L 64   ALBUMIN g/dL 2.4*   PROTEIN TOTAL g/dL 5.9*   BILIRUBIN TOTAL mg/dL 0.7     CBC RESULTS   Results from last 7 days   Lab Units 02/07/25  2006 02/07/25  0638 02/06/25  1827 02/06/25  1231 02/06/25  0613   WBC K/uL 5.12 4.49 4.98 5.13 5.31   HEMOGLOBIN g/dL 7.9* 7.9* 8.1* 8.5* 7.8*   HEMATOCRIT % 24.8* 24.0* 24.7* 25.7* 24.3*   PLATELETS K/uL 106* 79* 103* 89* 89*     ANEMIA STUDIES   Results from last 7 days   Lab Units 02/05/25  0625 02/04/25 2021   IRON ug/dL 222*  --    TIBC ug/dL 249*  --    FERRITIN ng/mL 38  --    VITAMIN B 12 pg/mL  --  637     COAGULATION   Results from last 7 days   Lab Units 02/04/25  1557   PROTIME sec 20.2*   INR  1.7   PTT sec 32     CARDIAC   Results from last 7 days   Lab Units 02/05/25  0625   BNP pg/mL 280*     MICROBIOLOGY   Microbiology Results       Procedure Component Value Units Date/Time    Blood Culture Blood, Venous [[id number]]  (Normal) Collected: 02/04/25 2021    Specimen: Blood, Venous Updated: 02/07/25 2300     Culture No growth at 72 hours    Blood Culture Blood, Venous [[id number]]  (Normal) Collected: 02/04/25 2021    Specimen: Blood, Venous Updated: 02/07/25 2300     Culture No growth at 72 hours            Pertinent Imaging  CT ANGIOGRAPHY ABDOMEN PELVIS WITH AND WITHOUT IV CONTRAST    Result Date: 2/6/2025  IMPRESSION: 1.  Contrast extravasation/active bleeding in the proximal sigmoid colon within the rectosigmoid stump.  Circumferential wall thickening of the distal sigmoid colon and rectum with fat stranding, appearance of some degree of acute inflammation. 2.  Left mid descending colostomy.  Diffuse colonic diverticula. 3.  Partially imaged cardiomegaly, findings of right-sided heart dysfunction, pleural effusions, body wall edema. 4.  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 Report agrees with preliminary radiologist report. Interpreted by: Janee Lovell MD, Feb 06, 2025 02:45 AM Interpreted by: Janee Lovell MD, Feb 06, 2025 02:49 AM Discussed with: Nurse Fabienne, who confirmed to Ass SBD relaying report to Dr at 02:47 AM and the results were critically read back    IR THORACENTESIS    Result Date: 2/5/2025  IMPRESSION: Successful ultrasound-guided left thoracentesis with 800 mL removed and discarded. I certify that I have personally reviewed this examination and agree with Betsy Benitez's report. Gigi Berry M.D.    X-RAY CHEST 1 VIEW    Result Date: 2/5/2025  IMPRESSION: Decreased pleural effusions. No pneumothorax.     IR THORACENTESIS    Result Date: 1/16/2025  IMPRESSION: Successful ultrasound-guided left-sided thoracentesis with 800 cc pleural fluid removed and discarded. I certify that I have personally reviewed this examination and agree with Avril Loya's report. Goyo Burnham M.D.    X-RAY CHEST 1 VIEW    Result Date: 1/16/2025  IMPRESSION: Slight decrease in size of large left pleural effusion with improving aeration left upper lobe/lingula.  Persistent left lower lobe atelectasis/consolidation, right pleural effusion and basilar atelectasis/infiltrate.  Interstitial edema. No appreciable pneumothorax.    Cardiac Imaging    TRANSTHORACIC ECHO (TTE) COMPLETE 09/24/2024    Interpretation Summary  Technically difficult study.    The left ventricular cavity size is small with mild left ventricular hypertrophy and mildly reduced systolic function. Estimated EF 40-45% by visual estimate. Abnormal septal motion due to RV volume overload. Unable to assess diastolic function due to technically difficult study. Low stroke volume (32 ml).    The aortic valve is tricuspid. Aortic valve and root sclerosis. Mild aortic regurgitation.    The mitral valve is thickened. Mild mitral annular calcification. Trace  regurgitation.    The left atrium is normal in size.    The right ventricular cavity is massively dilated with severely decreased systolic function. Apical hypercontractility.    The pulmonic valve is not well seen. There is trace pulmonic regurgitation.    There is a 13 mm coaptation gap between the leaflets of the tricuspid valve resulting in torrential ( wide -open) tricuspid regurgitation (PISA EROA 2.4cm2, Rvol 96 mL). Tricuspid valve annulus is dilated and measures 6.8 cm in diameter. Unable to assess RVSP in the setting of torrential tricuspid regurgitation.    The right atrium is severely dilated.    The IVC is massively dilated and collapses less than 50% with inspiration consistent with severely elevated right atrial pressure.    No pericardial effusion. Large pleural effusion.    Compared to previous echocardiogram from 3/28/2024, no significant change.      OTHER SERVICES PROVIDED   Consults During Admission  IP CONSULT TO PAIN/PALLIATIVE CARE  IP CONSULT TO GASTROENTEROLOGY    Procedures Performed  None      Thank you for allowing the Division of Hospital Medicine to care for your patient.        >30 mins spent for coordination/counselling related to discharge plan, including final examination of patient, discussion regarding discharge instructions, reconciliation/prescription of medications, preparation of discharge records, etc.

## 2025-02-08 NOTE — ASSESSMENT & PLAN NOTE
History of A-fib follows with Dr. Jeffrey  Maintained on metoprolol, previously on Xarelto, now discontinued  Anticoagulation has been complicated by recurrent GIB  [id number] score 5    - Hold any AC, BB in the setting of active GIB  - Patient has declined NISH closure procedure in the past

## 2025-02-16 ENCOUNTER — APPOINTMENT (EMERGENCY)
Dept: RADIOLOGY | Facility: HOSPITAL | Age: 89
DRG: 193 | End: 2025-02-16
Attending: EMERGENCY MEDICINE
Payer: MEDICARE

## 2025-02-16 ENCOUNTER — HOSPITAL ENCOUNTER (INPATIENT)
Facility: HOSPITAL | Age: 89
LOS: 3 days | Discharge: HOME HEALTH | DRG: 193 | End: 2025-02-19
Attending: EMERGENCY MEDICINE | Admitting: STUDENT IN AN ORGANIZED HEALTH CARE EDUCATION/TRAINING PROGRAM
Payer: MEDICARE

## 2025-02-16 DIAGNOSIS — R41.0 DELIRIUM: ICD-10-CM

## 2025-02-16 DIAGNOSIS — R41.0 CONFUSION: Primary | ICD-10-CM

## 2025-02-16 DIAGNOSIS — J18.9 MULTIFOCAL PNEUMONIA: ICD-10-CM

## 2025-02-16 PROBLEM — D64.9 CHRONIC ANEMIA: Status: ACTIVE | Noted: 2025-02-16

## 2025-02-16 LAB
ALBUMIN SERPL-MCNC: 2.6 G/DL (ref 3.5–5.7)
ALP SERPL-CCNC: 83 IU/L (ref 34–125)
ALT SERPL-CCNC: 6 IU/L (ref 7–52)
ANION GAP SERPL CALC-SCNC: 5 MEQ/L (ref 3–15)
ANISOCYTOSIS BLD QL SMEAR: ABNORMAL
AST SERPL-CCNC: 28 IU/L (ref 13–39)
BACTERIA URNS QL MICRO: ABNORMAL /HPF
BASE EXCESS BLDV CALC-SCNC: -0.2 MEQ/L
BASOPHILS # BLD: 0.03 K/UL (ref 0.01–0.1)
BASOPHILS NFR BLD: 0.6 %
BILIRUB SERPL-MCNC: 0.6 MG/DL (ref 0.3–1.2)
BILIRUB UR QL STRIP.AUTO: NEGATIVE MG/DL
BUN SERPL-MCNC: 26 MG/DL (ref 7–25)
BURR CELLS BLD QL SMEAR: ABNORMAL
CALCIUM SERPL-MCNC: 7.5 MG/DL (ref 8.6–10.3)
CHLORIDE SERPL-SCNC: 111 MEQ/L (ref 98–107)
CLARITY UR REFRACT.AUTO: CLEAR
CO2 BLDV-SCNC: 26.8 MEQ/L (ref 22–32)
CO2 SERPL-SCNC: 25 MEQ/L (ref 21–31)
COLOR UR AUTO: YELLOW
CREAT SERPL-MCNC: 1.5 MG/DL (ref 0.7–1.3)
DIFFERENTIAL METHOD BLD: ABNORMAL
EGFRCR SERPLBLD CKD-EPI 2021: 43.1 ML/MIN/1.73M*2
EOSINOPHIL # BLD: 0.12 K/UL (ref 0.04–0.54)
EOSINOPHIL NFR BLD: 2.6 %
ERYTHROCYTE [DISTWIDTH] IN BLOOD BY AUTOMATED COUNT: 22.2 % (ref 11.6–14.4)
FERRITIN SERPL-MCNC: 93 NG/ML (ref 24–250)
FIO2 ON VENT: 21 %
FLUAV RNA SPEC QL NAA+PROBE: NEGATIVE
FLUBV RNA SPEC QL NAA+PROBE: NEGATIVE
GLUCOSE SERPL-MCNC: 100 MG/DL (ref 70–99)
GLUCOSE UR STRIP.AUTO-MCNC: NEGATIVE MG/DL
HCO3 BLDV-SCNC: 23.4 MEQ/L (ref 21–28)
HCT VFR BLD AUTO: 28.1 % (ref 40.1–51)
HGB BLD-MCNC: 8.6 G/DL (ref 13.7–17.5)
HGB UR QL STRIP.AUTO: ABNORMAL
HYALINE CASTS #/AREA URNS LPF: ABNORMAL /LPF
IMM GRANULOCYTES # BLD AUTO: 0.02 K/UL (ref 0–0.08)
IMM GRANULOCYTES NFR BLD AUTO: 0.4 %
INHALED O2 CONCENTRATION: ABNORMAL %
IRON SATN MFR SERPL: 28 % (ref 15–45)
IRON SERPL-MCNC: 73 UG/DL (ref 35–150)
KETONES UR STRIP.AUTO-MCNC: NEGATIVE MG/DL
L PNEUMO1 AG UR QL: NEGATIVE
LACTATE SERPL-SCNC: 1.5 MMOL/L (ref 0.4–2)
LACTATE SERPL-SCNC: 1.6 MMOL/L (ref 0.4–2)
LEUKOCYTE ESTERASE UR QL STRIP.AUTO: 1
LYMPHOCYTES # BLD: 0.52 K/UL (ref 1.2–3.5)
LYMPHOCYTES NFR BLD: 11.1 %
MACROCYTES BLD QL SMEAR: ABNORMAL
MCH RBC QN AUTO: 30.9 PG (ref 28–33.2)
MCHC RBC AUTO-ENTMCNC: 30.6 G/DL (ref 32.2–36.5)
MCV RBC AUTO: 101.1 FL (ref 83–98)
MONOCYTES # BLD: 0.56 K/UL (ref 0.3–1)
MONOCYTES NFR BLD: 11.9 %
MRSA DNA SPEC QL NAA+PROBE: NEGATIVE
NEUTROPHILS # BLD: 3.44 K/UL (ref 1.7–7)
NEUTS SEG NFR BLD: 73.4 %
NITRITE UR QL STRIP.AUTO: NEGATIVE
NRBC BLD-RTO: 0 %
OVALOCYTES BLD QL SMEAR: ABNORMAL
PCO2 BLDV: 45 MM HG (ref 41–51)
PH BLDV: 7.36 [PH] (ref 7.32–7.42)
PH UR STRIP.AUTO: 6 [PH]
PLATELET # BLD AUTO: 94 K/UL (ref 150–350)
PLATELET # BLD EST: ABNORMAL 10*3/UL
PMV BLD AUTO: 9.8 FL (ref 9.4–12.4)
PO2 BLDV: <20 MM HG (ref 25–40)
POTASSIUM SERPL-SCNC: 4.3 MEQ/L (ref 3.5–5.1)
PROT SERPL-MCNC: 6.5 G/DL (ref 6–8.2)
PROT UR QL STRIP.AUTO: NEGATIVE
QRS DURATION: 170
QT INTERVAL: 516
QTC CALCULATION(BAZETT): 510
R AXIS: 95
RBC # BLD AUTO: 2.78 M/UL (ref 4.5–5.8)
RBC #/AREA URNS HPF: ABNORMAL /HPF
RENAL EPI CELLS URNS QL MICRO: ABNORMAL /HPF
RSV RNA SPEC QL NAA+PROBE: NEGATIVE
SARS-COV-2 RNA RESP QL NAA+PROBE: NEGATIVE
SODIUM SERPL-SCNC: 141 MEQ/L (ref 136–145)
SP GR UR REFRACT.AUTO: 1.01
SQUAMOUS URNS QL MICRO: ABNORMAL /HPF
T WAVE AXIS: 117
TIBC SERPL-MCNC: 265 UG/DL (ref 270–460)
TRANS CELLS URNS QL MICRO: ABNORMAL /HPF
TROPONIN I SERPL HS-MCNC: 17.5 PG/ML
TROPONIN I SERPL HS-MCNC: 19.7 PG/ML
UIBC SERPL-MCNC: 192 UG/DL (ref 180–360)
UROBILINOGEN UR STRIP-ACNC: 0.2 EU/DL
VENTRICULAR RATE: 59
WBC # BLD AUTO: 4.69 K/UL (ref 3.8–10.5)
WBC #/AREA URNS HPF: ABNORMAL /HPF

## 2025-02-16 PROCEDURE — 80053 COMPREHEN METABOLIC PANEL: CPT | Performed by: EMERGENCY MEDICINE

## 2025-02-16 PROCEDURE — 84484 ASSAY OF TROPONIN QUANT: CPT | Performed by: EMERGENCY MEDICINE

## 2025-02-16 PROCEDURE — 87086 URINE CULTURE/COLONY COUNT: CPT | Performed by: EMERGENCY MEDICINE

## 2025-02-16 PROCEDURE — 87040 BLOOD CULTURE FOR BACTERIA: CPT | Performed by: EMERGENCY MEDICINE

## 2025-02-16 PROCEDURE — 63600105 HC IODINE BASED CONTRAST: Mod: JW | Performed by: EMERGENCY MEDICINE

## 2025-02-16 PROCEDURE — 87449 NOS EACH ORGANISM AG IA: CPT

## 2025-02-16 PROCEDURE — 36415 COLL VENOUS BLD VENIPUNCTURE: CPT | Performed by: EMERGENCY MEDICINE

## 2025-02-16 PROCEDURE — 74177 CT ABD & PELVIS W/CONTRAST: CPT

## 2025-02-16 PROCEDURE — 25800000 HC PHARMACY IV SOLUTIONS

## 2025-02-16 PROCEDURE — 82728 ASSAY OF FERRITIN: CPT

## 2025-02-16 PROCEDURE — 71045 X-RAY EXAM CHEST 1 VIEW: CPT

## 2025-02-16 PROCEDURE — 70450 CT HEAD/BRAIN W/O DYE: CPT

## 2025-02-16 PROCEDURE — 63600000 HC DRUGS/DETAIL CODE: Mod: JZ

## 2025-02-16 PROCEDURE — 83615 LACTATE (LD) (LDH) ENZYME: CPT

## 2025-02-16 PROCEDURE — 87637 SARSCOV2&INF A&B&RSV AMP PRB: CPT | Performed by: EMERGENCY MEDICINE

## 2025-02-16 PROCEDURE — 99223 1ST HOSP IP/OBS HIGH 75: CPT

## 2025-02-16 PROCEDURE — 83605 ASSAY OF LACTIC ACID: CPT | Performed by: EMERGENCY MEDICINE

## 2025-02-16 PROCEDURE — 84484 ASSAY OF TROPONIN QUANT: CPT | Mod: 91 | Performed by: EMERGENCY MEDICINE

## 2025-02-16 PROCEDURE — 93005 ELECTROCARDIOGRAM TRACING: CPT

## 2025-02-16 PROCEDURE — 81001 URINALYSIS AUTO W/SCOPE: CPT | Performed by: EMERGENCY MEDICINE

## 2025-02-16 PROCEDURE — 85025 COMPLETE CBC W/AUTO DIFF WBC: CPT | Performed by: EMERGENCY MEDICINE

## 2025-02-16 PROCEDURE — 25800000 HC PHARMACY IV SOLUTIONS: Performed by: EMERGENCY MEDICINE

## 2025-02-16 PROCEDURE — 63700000 HC SELF-ADMINISTRABLE DRUG

## 2025-02-16 PROCEDURE — 93005 ELECTROCARDIOGRAM TRACING: CPT | Performed by: EMERGENCY MEDICINE

## 2025-02-16 PROCEDURE — 1124F ACP DISCUSS-NO DSCNMKR DOCD: CPT

## 2025-02-16 PROCEDURE — 25000000 HC PHARMACY GENERAL: Performed by: EMERGENCY MEDICINE

## 2025-02-16 PROCEDURE — 96360 HYDRATION IV INFUSION INIT: CPT | Mod: 59

## 2025-02-16 PROCEDURE — 63600000 HC DRUGS/DETAIL CODE: Mod: JZ | Performed by: EMERGENCY MEDICINE

## 2025-02-16 PROCEDURE — 83540 ASSAY OF IRON: CPT

## 2025-02-16 PROCEDURE — 99285 EMERGENCY DEPT VISIT HI MDM: CPT | Mod: 25

## 2025-02-16 PROCEDURE — 93010 ELECTROCARDIOGRAM REPORT: CPT | Performed by: INTERNAL MEDICINE

## 2025-02-16 PROCEDURE — 87641 MR-STAPH DNA AMP PROBE: CPT

## 2025-02-16 PROCEDURE — 12000000 HC ROOM AND CARE MED/SURG

## 2025-02-16 PROCEDURE — 99223 1ST HOSP IP/OBS HIGH 75: CPT | Performed by: INTERNAL MEDICINE

## 2025-02-16 RX ORDER — FAMOTIDINE 20 MG/1
10 TABLET, FILM COATED ORAL DAILY
Status: DISCONTINUED | OUTPATIENT
Start: 2025-02-16 | End: 2025-02-19 | Stop reason: HOSPADM

## 2025-02-16 RX ORDER — FERROUS SULFATE 300 MG/5ML
162 LIQUID (ML) ORAL
Status: DISCONTINUED | OUTPATIENT
Start: 2025-02-17 | End: 2025-02-19 | Stop reason: HOSPADM

## 2025-02-16 RX ORDER — VANCOMYCIN HYDROCHLORIDE
20 ONCE
Status: COMPLETED | OUTPATIENT
Start: 2025-02-16 | End: 2025-02-16

## 2025-02-16 RX ORDER — THIAMINE HCL 100 MG
100 TABLET ORAL EVERY MORNING
Status: DISCONTINUED | OUTPATIENT
Start: 2025-02-16 | End: 2025-02-19 | Stop reason: HOSPADM

## 2025-02-16 RX ORDER — CALCIUM CARBONATE 200(500)MG
200 TABLET,CHEWABLE ORAL DAILY
Status: DISCONTINUED | OUTPATIENT
Start: 2025-02-16 | End: 2025-02-19 | Stop reason: HOSPADM

## 2025-02-16 RX ORDER — TORSEMIDE 20 MG/1
10 TABLET ORAL EVERY MORNING
Status: DISCONTINUED | OUTPATIENT
Start: 2025-02-16 | End: 2025-02-19 | Stop reason: HOSPADM

## 2025-02-16 RX ORDER — ASPIRIN 81 MG/1
81 TABLET ORAL DAILY
Status: DISCONTINUED | OUTPATIENT
Start: 2025-02-16 | End: 2025-02-19 | Stop reason: HOSPADM

## 2025-02-16 RX ORDER — DEXTROSE 50 % IN WATER (D50W) INTRAVENOUS SYRINGE
25 AS NEEDED
Status: DISCONTINUED | OUTPATIENT
Start: 2025-02-16 | End: 2025-02-19 | Stop reason: HOSPADM

## 2025-02-16 RX ORDER — PANTOPRAZOLE SODIUM 20 MG/1
20 TABLET, DELAYED RELEASE ORAL DAILY
Status: DISCONTINUED | OUTPATIENT
Start: 2025-02-16 | End: 2025-02-19 | Stop reason: HOSPADM

## 2025-02-16 RX ORDER — LEVOTHYROXINE SODIUM 100 UG/1
100 TABLET ORAL
Status: DISCONTINUED | OUTPATIENT
Start: 2025-02-17 | End: 2025-02-19 | Stop reason: HOSPADM

## 2025-02-16 RX ORDER — LATANOPROST 50 UG/ML
1 SOLUTION/ DROPS OPHTHALMIC NIGHTLY
Status: DISCONTINUED | OUTPATIENT
Start: 2025-02-16 | End: 2025-02-19 | Stop reason: HOSPADM

## 2025-02-16 RX ORDER — FINASTERIDE 5 MG/1
5 TABLET, FILM COATED ORAL DAILY
Status: DISCONTINUED | OUTPATIENT
Start: 2025-02-16 | End: 2025-02-19 | Stop reason: HOSPADM

## 2025-02-16 RX ORDER — IOPAMIDOL 755 MG/ML
100 INJECTION, SOLUTION INTRAVASCULAR
Status: COMPLETED | OUTPATIENT
Start: 2025-02-16 | End: 2025-02-16

## 2025-02-16 RX ORDER — MELATONIN 5 MG
10 CAPSULE ORAL NIGHTLY
Status: DISCONTINUED | OUTPATIENT
Start: 2025-02-16 | End: 2025-02-19 | Stop reason: HOSPADM

## 2025-02-16 RX ORDER — DEXTROSE 40 %
15-30 GEL (GRAM) ORAL AS NEEDED
Status: DISCONTINUED | OUTPATIENT
Start: 2025-02-16 | End: 2025-02-19 | Stop reason: HOSPADM

## 2025-02-16 RX ORDER — IBUPROFEN 200 MG
16-32 TABLET ORAL AS NEEDED
Status: DISCONTINUED | OUTPATIENT
Start: 2025-02-16 | End: 2025-02-19 | Stop reason: HOSPADM

## 2025-02-16 RX ADMIN — PIPERACILLIN SODIUM AND TAZOBACTAM SODIUM 4.5 G: 4; .5 INJECTION, POWDER, LYOPHILIZED, FOR SOLUTION INTRAVENOUS at 11:15

## 2025-02-16 RX ADMIN — Medication 10 MG: at 21:24

## 2025-02-16 RX ADMIN — SODIUM CHLORIDE 500 ML: 9 INJECTION, SOLUTION INTRAVENOUS at 08:25

## 2025-02-16 RX ADMIN — VANCOMYCIN HYDROCHLORIDE 1500 MG: 10 INJECTION, POWDER, LYOPHILIZED, FOR SOLUTION INTRAVENOUS at 11:16

## 2025-02-16 RX ADMIN — LATANOPROST 1 DROP: 50 SOLUTION OPHTHALMIC at 22:27

## 2025-02-16 RX ADMIN — THIAMINE HCL TAB 100 MG 100 MG: 100 TAB at 13:35

## 2025-02-16 RX ADMIN — ASPIRIN 81 MG: 81 TABLET, COATED ORAL at 13:35

## 2025-02-16 RX ADMIN — IOPAMIDOL 80 ML: 755 INJECTION, SOLUTION INTRAVENOUS at 09:28

## 2025-02-16 RX ADMIN — SODIUM CHLORIDE 2 G: 9 INJECTION, SOLUTION INTRAVENOUS at 14:40

## 2025-02-16 RX ADMIN — ANTACID TABLETS 200 MG OF ELEMENTAL CALCIUM: 500 TABLET, CHEWABLE ORAL at 13:35

## 2025-02-16 RX ADMIN — PANTOPRAZOLE SODIUM 20 MG: 20 TABLET, DELAYED RELEASE ORAL at 13:35

## 2025-02-16 RX ADMIN — FAMOTIDINE 10 MG: 20 TABLET, FILM COATED ORAL at 13:35

## 2025-02-16 RX ADMIN — SODIUM CHLORIDE 500 ML: 9 INJECTION, SOLUTION INTRAVENOUS at 04:23

## 2025-02-16 RX ADMIN — METOPROLOL SUCCINATE 12.5 MG: 25 TABLET, EXTENDED RELEASE ORAL at 17:30

## 2025-02-16 RX ADMIN — SODIUM CHLORIDE 500 ML: 9 INJECTION, SOLUTION INTRAVENOUS at 10:41

## 2025-02-16 RX ADMIN — FINASTERIDE 5 MG: 5 TABLET, FILM COATED ORAL at 13:35

## 2025-02-16 ASSESSMENT — ENCOUNTER SYMPTOMS
BLOOD IN STOOL: 0
SEIZURES: 0
HEADACHES: 0
PALPITATIONS: 0
FEVER: 0
RECTAL PAIN: 0
SORE THROAT: 0
DIAPHORESIS: 0
TREMORS: 0
ABDOMINAL PAIN: 0
CHILLS: 0
DIARRHEA: 0
SPEECH DIFFICULTY: 0
ALTERED MENTAL STATUS: 1
CONSTIPATION: 0

## 2025-02-16 NOTE — PROGRESS NOTES
"Vancomycin Dosing by Pharmacy Consult Initiated    Samy Elena is a 89 y.o. male who has been consulted for vancomycin dosing for pneumonia prescribed by Imelda Altamirano .    Reviewed relevant clinical data including weight, renal function, previous vancomycin doses, and vancomycin levels:  Creatinine   Date/Time Value Ref Range Status   02/16/2025 0148 1.5 (H) 0.7 - 1.3 mg/dL Final     No results found for: \"VANCORANDOM\", \"VANCOTROUGH\", \"VANCOPEAK\"      Vancomycin Administrations (last 96 hours)       Date/Time Action Medication Dose Rate    02/16/25 1116 New Bag    vancomycin 1.5 gram/250 mL IVPB in NSS 1,500 mg 125 mL/hr            Assessment/Plan  The patient is ordered vancomycin dosing by pharmacy.    The dose will be based on:         Wt Readings from Last 1 Encounters:   02/16/25 71.7 kg (158 lb)         Estimated Creatinine Clearance: 31.2 mL/min by (C-G formula)      Patient is currently getting vancomycin 1500 mg x 1 in ED, which satisfies the loading dose. Will initiate maintenance dose of 750 mg IV every 24 hours.    Target a trough of 15-20 ug/mL per vancomycin dosing per pharmacy order.  Pharmacy will continue to follow the patient's vancomycin dosing daily.      Please call vancomycin levels to the pharmacy.  Viviana Mena, PharmD    "

## 2025-02-16 NOTE — CONSULTS
Infectious Disease Consult Note    Patient Name: Samy Elena  MR#: [id number]  : 10/6/1930  Admission Date: 2025  Consult Date: 25 11:42 AM   Consultant: Josh Foote DO    Reason for Consult: Concern for pneumonia - recent hospitalisation - cover for HAP  Referring Provider: Dr. Ana Smith      History of Present Illness     Patient is a 89 yo M hx CAD s/p CABG, HFrEF, A-fib not on AC 2/2 recurrent GI bleed/diverticulitis s/p colostomy with rectal stump, recurrent left-sided pleural effusion requiring multiple thoracenteses, CKD, hypothyroidism, prior Denise's gangrene who presented to care for weakness.    At home, patient had appeared more confused since yesterday.  Patient is unable to recount the exact circumstances of his admission but is able to explain that he was told he had a urinary tract infection.  Endorses increased urinary frequency.  Denies any pain on urination, cough, nausea, vomiting, diarrhea, fevers, chills.  Does report having a wound on his backside   But this has not been bothering him.  Denies any new shortness of breath but explains that he was due for another thoracentesis this week.    Patient was recently admitted  -  due to rectal bleeding.    Outside records were reviewed.    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)     Coronary artery disease     Disease of thyroid gland     Will catheter in place      not at present    GI (gastrointestinal bleed)     Hypertension     Myocardial infarction (CMS/HCC)     Orthostatic hypotension     TIA (transient ischemic attack)        Surgical History:   Past Surgical History   Procedure  Laterality Date    cataract extraction right eye with implant and limbal relaxing incision Right 7/18/2024    Performed by Hayder Moess MD at  OR [facility]    Cataract extraction w/ intraocular lens implant Left     Cataract extraction with LRI and anterior vitrectomy left eye Left 11/16/2023    Performed by Hayder Moses MD at  OR [facility]    Cholecystectomy      Colostomy      COLOSTOMY LAPAROSCOPIC N/A 8/3/2022    Performed by [place] Bryant [place] at [facility]    Coronary artery bypass graft      DIAGNOSTIC FLEXIBLE SIGMOIDOSCOPY WITH COLLECTION OF SPECIMEN BY WASHING N/A 11/27/2024    Performed by David Holcomb [facility] at [facility]    DIAGNOSTIC FLEXIBLE SIGMOIDOSCOPY WITH COLLECTION OF SPECIMEN BY WASHING N/A 10/18/2024    Performed by Maine Lopez MD at [facility]    DIAGNOSTIC UPPER GASTROINTESTINAL ENDOSCOPY WITH COLLECTION OF SPECIMEN BY WASHING N/A 11/27/2024    Performed by David Holcomb MD at [facility]    FLEXIBLE SIGMOIDOSCOPY WITH BIOPSY N/A 10/23/2024    Performed by David Holcomb MD at [facility]    INCISION AND DRAINAGE WITH DEBRIDMENT OF BUTTOCK, AND PERINEUM N/A 8/2/2022    Performed by Mat Bryant MD at [facility]    Joint replacement Left     Knee    RIGHT HEART CATH N/A 8/4/2022    Performed by Cristal Lacey [facility] at [facility] [place] [place]    Thyroidectomy      WASHOUT OF PERINEAL WOUND, COLONIC LAVAGE N/A 8/3/2022    Performed by Mat Bryant [facility] at [facility]       Social History:   Social History     Socioeconomic History    Marital status:    Tobacco Use    Smoking status: Never    Smokeless tobacco: Never   Vaping Use    Vaping status: Never Used   Substance and Sexual Activity    Alcohol use: Not Currently     Comment: social    Drug use: Never    Sexual activity: Defer     Social Drivers of Health     Financial Resource Strain: Low Risk  (8/4/2023)    Overall Financial Resource Strain (CARDIA)     Difficulty of Paying Living Expenses: Not hard at all   Food Insecurity: No Food  "Insecurity (2/16/2025)    Hunger Vital Sign     Worried About Running Out of Food in the Last Year: Never true     Ran Out of Food in the Last Year: Never true   Transportation Needs: No Transportation Needs (2/6/2025)    PRAPARE - Transportation     Lack of Transportation (Medical): No     Lack of Transportation (Non-Medical): No   Housing Stability: Low Risk  (2/6/2025)    Housing Stability Vital Sign     Unable to Pay for Housing in the Last Year: No     Number of Times Moved in the Last Year: 0     Homeless in the Last Year: No          Family History: Father-cancer, mother-cardiovascular disease, sister-breast cancer, brother-diabetes    Travel Exposure:   Travel and Exposure Screening      Flowsheet Row Most Recent Value   Travel Screening    Overnight hospitalization outside the [place] in the last year? No Filed On: 02/16/2025 0156   Exposure Screening    Symptoms             Animal Exposure:     Review of Systems    12-point ROS performed and negative unless otherwise noted in HPI    Medications:    Current IP Meds (From admission, onward)          Frequency     glucose chewable tablet 16-32 g of dextrose  (Hypoglycemia Treatment Protocol and Hyperglycemia Validation Protocol)        Placed in \"Or\" Linked Group    As needed     dextrose 40 % oral gel 15-30 g of dextrose  (Hypoglycemia Treatment Protocol and Hyperglycemia Validation Protocol)        Placed in \"Or\" Linked Group    As needed     glucagon (GLUCAGEN) injection 1 mg  (Hypoglycemia Treatment Protocol and Hyperglycemia Validation Protocol)        Placed in \"Or\" Linked Group    As needed     dextrose 50 % in water (D50) injection 12.5 g  (Hypoglycemia Treatment Protocol and Hyperglycemia Validation Protocol)        Placed in \"Or\" Linked Group    As needed     vancomycin 1.5 gram/250 mL IVPB in NSS  (ED IV Antibiotics)         Once     piperacillin-tazobactam (ZOSYN) 4.5 g/100 mL IVPB in NSS  (Sepsis Antibiotics)         Once     sodium chloride 0.9 % " bolus 500 mL         Once     iopamidoL (ISOVUE-370) 370 mg iodine /mL (76 %) injection 100 mL         Once in imaging     sodium chloride 0.9 % bolus 500 mL         Once     sodium chloride 0.9 % bolus 500 mL         Once            Anti-infectives (From admission, onward)      Start     Dose/Rate Route Frequency Ordered Stop    25 1045  vancomycin 1.5 gram/250 mL IVPB in NSS         20 mg/kg × 71.7 kg  125 mL/hr over 120 Minutes intravenous Once 25 1042 25 2244    25 1030  piperacillin-tazobactam (ZOSYN) 4.5 g/100 mL IVPB in NSS         4.5 g  200 mL/hr over 30 Minutes intravenous Once 25 1028 25 2229              Objective     Vital Signs:    Patient Vitals for the past 72 hrs:   BP Temp Temp src Pulse Resp SpO2 Height Weight   25 1121 108/66 36.2 °C (97.2 °F) Oral 68 (!) 23 100 % -- --   25 1031 -- -- -- -- -- -- -- 71.7 kg (158 lb)   25 0831 (!) 103/59 -- -- 66 (!) 29 92 % -- --   25 0829 -- (!) 35.8 °C (96.4 °F) Rectal -- -- -- -- --   25 0800 (!) 100/54 -- -- 62 (!) 29 94 % -- --   25 0730 (!) 92/58 -- -- 67 (!) 25 96 % -- --   25 0700 (!) 106/58 -- -- 64 15 97 % -- --   25 0600 (!) 101/58 -- -- 63 19 99 % -- --   25 0530 (!) 101/54 -- -- 62 20 98 % -- --   25 0500 (!) 97/52 -- -- 62 17 97 % -- --   25 0430 (!) 97/54 -- -- (!) 59 15 98 % -- --   25 0400 (!) 96/52 -- -- 62 18 97 % -- --   25 0300 (!) 99/51 -- -- 62 17 98 % -- --   25 0230 (!) 91/54 -- -- 61 16 95 % -- --   25 0200 (!) 106/54 -- -- (!) 59 18 96 % -- --   25 0140 (!) 105/55 36.6 °C (97.8 °F) Oral 64 20 96 % 1.829 m (6') --       Temp (72hrs), Av.2 °C (97.1 °F), Min:35.8 °C (96.4 °F), Max:36.6 °C (97.8 °F)      Physical Exam:    Visit Vitals  /66 (BP Location: Right upper arm, Patient Position: Lying)   Pulse 68   Temp 36.2 °C (97.2 °F) (Oral)   Resp (!) 23   Ht 1.829 m (6')   Wt 71.7 kg (158 lb)   SpO2  100%   BMI 21.43 kg/m²           General: non toxic, alert  HEENT: NC/AT/ anicteric sclera  Neck: supple, no meningismus  Cardiovascular: S1/S2 present.  No murmur, rub or gallop  Respiratory: clear to auscultation  GI/Abdomen: soft, NT/ND,  no peritoneal signs  : no CVA tenderness  Extremities: no clubbing, cyanosis, or edema  JOINTS:  No swelling, erythema, or pain  Lymphatics: no lymphadenopathy  Neurology: no focal deficits  Psych: cooperative with exam  Skin: no rashes    Lines, Drains, Airways, Wounds:  Peripheral IV (Adult) 02/16/25 Left Forearm (Active)   Number of days: 0       Peripheral IV (Adult) 02/16/25 Right Forearm (Active)   Number of days: 0       Labs:    CBC Results         02/16/25 02/08/25 02/07/25     0148 1017 2006    WBC 4.69 4.88 5.12    RBC 2.78 2.62 2.57    HGB 8.6 7.9 7.9    HCT 28.1 25.1 24.8    .1 95.8 96.5    MCH 30.9 30.2 30.7    MCHC 30.6 31.5 31.9    PLT 94 92 106           Comment for PLT at 1017 on 02/08/25: RESULTS CHECKED    Comment for PLT at 2006 on 02/07/25: RESULTS OBTAINED AFTER VORTEXING TO ELIMINATE PLT CLUMPSSPECIMEN QUALITY CHECKED          CMP Results         02/16/25 02/08/25 02/07/25     0148 0550 0638     139 140    K 4.3 4.4 3.7    Cl 111 108 113    CO2 25 24 20    Glucose 100 75 74    BUN 26 25 22    Creatinine 1.5 1.3 1.3    Calcium 7.5 7.0 6.5    Anion Gap 5 7 7    AST 28 -- --    ALT 6 -- --    Albumin 2.6 -- --    EGFR 43.1 51.2 51.2           Comment for K at 0148 on 02/16/25: Results obtained on plasma. Plasma Potassium values may be up to 0.4 mEQ/L less than serum values. The differences may be greater for patients with high platelet or white cell counts.    Comment for EGFR at 0148 on 02/16/25: Calculation based on the Chronic Kidney Disease Epidemiology Collaboration (CKD-EPI) equation refit without adjustment for race.    Comment for EGFR at 0550 on 02/08/25: Calculation based on the Chronic Kidney Disease Epidemiology Collaboration  (CKD-EPI) equation refit without adjustment for race.    Comment for EGFR at 0638 on 02/07/25: Calculation based on the Chronic Kidney Disease Epidemiology Collaboration (CKD-EPI) equation refit without adjustment for race.            Microbiology Results       Procedure Component Value Units Date/Time    SARS-COV-2 (COVID-19)/ FLU A/B, AND RSV, PCR Nasopharynx [[id number]]  (Normal) Collected: 02/16/25 0824    Specimen: Nasopharyngeal Swab from Nasopharynx Updated: 02/16/25 0938     SARS-CoV-2 (COVID-19) Negative     Influenza A Negative     Influenza B Negative     Respiratory Syncytial Virus Negative    Narrative:      Testing performed using real-time PCR for detection of COVID-19. EUA approved validation studies performed on site.     Blood Culture Blood, Venous [[id number]]  (Normal) Collected: 02/04/25 2021    Specimen: Blood, Venous Updated: 02/08/25 2300     Culture No growth at 96 hours    Blood Culture Blood, Venous [[id number]]  (Normal) Collected: 02/04/25 2021    Specimen: Blood, Venous Updated: 02/08/25 2300     Culture No growth at 96 hours            Imaging:    Radiology Imaging    XR CHEST 1 VW    Narrative  CLINICAL HISTORY: eval for pna; weakness/hypotension    COMMENT: Frontal view of the chest obtained.    COMPARISON: 2/5/2025.    LINES/TUBES: Sternotomy wires, many of which are broken. Clips.    OTHER: Slight worsening of background pulmonary edema and slight worsening of  large left and moderate right pleural effusions, which may be at least partially  loculated on the left. No definite pneumothorax or large pleural effusion,  noting patient positioning obscures portions of the lung apices. Stable  cardiomediastinal silhouette.    Impression  IMPRESSION: Please see comment.        Recent Results (from the past week)   X-RAY CHEST 1 VIEW    Collection Time: 02/16/25  4:01 AM    Narrative    CLINICAL HISTORY: eval for pna; weakness/hypotension    COMMENT: Frontal view of the chest  obtained.    COMPARISON: 2/5/2025.    LINES/TUBES: Sternotomy wires, many of which are broken. Clips.    OTHER: Slight worsening of background pulmonary edema and slight worsening of  large left and moderate right pleural effusions, which may be at least partially  loculated on the left. No definite pneumothorax or large pleural effusion,  noting patient positioning obscures portions of the lung apices. Stable  cardiomediastinal silhouette.      Impression    IMPRESSION: Please see comment.       CT HEAD WITHOUT IV CONTRAST    Collection Time: 02/16/25  9:27 AM    Narrative    CLINICAL HISTORY:  ams; eval for cva/other pathology    CT DOSE:  One or more dose reduction techniques (e.g. automated exposure  control, adjustment of the mA and/or kV according to patient size, use of  iterative reconstruction technique) utilized for this examination.    COMMENT:    Noncontrast CT scan of the brain was performed from the skull base through the  higher convexities.  Sagittal and coronal reconstructions were performed.    Comparison date: September 23, 2024.    The ventricles are normal in size and position for the patient's age. There is  generalized cerebral atrophy and small vessel ischemic change. Small lacune left  insula There is no mass effect or midline shift.  There is no abnormal  intra-axial or extra axial blood or fluid collections.  There is no evidence of  acute infarction. The visualized paranasal sinuses are clear. The osseous  structures are normal.        Impression    IMPRESSION:  Generalized atrophy and small vessel ischemic change..     CT CHEST/ABDOMEN/PELVIS WITH IV CONTRAST    Collection Time: 02/16/25  9:28 AM    Narrative    CLINICAL HISTORY:   Sepsis    COMPARISON:   February 16th 2025 July 28, 2024    COMMENT:  Technique: CT of the Chest, Abdomen and Pelvis was performed with IV contrast:    80mL of iopamidoL (ISOVUE-370) 370 mg iodine /mL (76 %) injection 100 mL          CT DOSE:  One or more dose  reduction techniques (e.g. automated exposure  control, adjustment of the mA and/or kV according to patient size, use of  iterative reconstruction technique) utilized for this examination.    COMMENT:    CHEST    Lungs airways and pleura: There are bilateral large pleural effusions left is  larger than right with near complete opacification left hemithorax. There is  associated volume loss and or consolidation in the left lung and the right lower  middle lobe..   Extrinsic narrowing of the right left proximal bronchi  Lower Neck: within normal limits.  Axilla: No enlarged nodes.  Mediastinum and Gauri: No enlarged nodes.  Esophagus: within normal limits.  Heart and Pericardium: Cardiomegaly.  No pericardial effusion.  Chest Wall: Within normal limits.    ABDOMEN    Liver: There is reflux of contrast into the hepatic veins with a prominent IVC  this can be seen in right heart dysfunction..  Bile ducts: Normal caliber.  Gallbladder: Removed  Pancreas: Fatty replaced.  Spleen: within normal limits.  Adrenals: within normal limits.  Kidneys: Bilateral renal cysts limited evaluation due to streak artifact...  Ureters: No obstructing ureteral calculi.    Delayed imaging: Symmetric    PELVIS:  Reproductive organs: Prostate enlargement. Coarse pelvic calcifications..  Bladder: within normal limits.    Bowel: Postsurgical change..  Diverticulosis. There is high density material  within the sigmoid colon lack of noncontrast imaging limits evaluation for  bleeding, correlate clinically.  Peritoneum: No free air or free fluid  Vessels:  Vascular calcification. Bovine aortic arch noted. 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  Lymph Nodes:  No enlarged nodes  Abdominal wall: Left lower quadrant ostomy.  Bones: Degenerative and postsurgical change. Compression deformity T5, L1 and  L4.      Impression    IMPRESSION:  Extrinsic narrowing of the right left proximal bronchi    There are bilateral large pleural effusions left is  larger than right with near  complete opacification left hemithorax. There is associated volume loss and or  consolidation in the left lung and the right lower middle lobe..    There is reflux of contrast into the hepatic veins with a prominent IVC this can  be seen in right heart dysfunction..    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    Bilateral renal cysts limited evaluation due to streak artifact...    Diverticulosis.There is high density material within the sigmoid colon lack of  noncontrast imaging limits evaluation for bleeding, correlate clinically         Assessment   89 yo M hx CAD s/p CABG, HFrEF, A-fib not on AC 2/2 recurrent GI bleed s/p colostomy with rectal stump, recurrent left-sided pleural effusion requiring multiple thoracenteses, CKD, hypothyroidism, prior Justin's gangrene who presented to care for weakness. ID consulted given c/f PNA    ID-related problems    Sepsis 2/2 pneumonia v UTI v likely SIRS in setting of prior GIB and debility-T 35.9.  RR 25.  WBC 4.  CT chest/A/P with extrinsic narrowing of right left small bronchi.  B/L large pleural effusions left larger than right with near complete opacification of left hemithorax.  Associated volume loss and/or consolidation left lung and right lower middle lobe.  CT head with generalized atrophy and small vessel ischemic change.  History of an ESBL organism in Ucx    Recommendations    Please assess for potential thora and if drainage performed please obtain aerobic/anaerobic, AFB, fungal cultures in addition to fluid analysis  C/W vancomycin as dosed by pharmacy (goal trough 15-20) pending MRSA nares  Stop pip-tazo and start meropenem 2g q12 (CrCl 31 02/16).   Please obtain MRSA nares  Please obtain sputum culture if and when develops productive cough  Fu Bcx, urine legionella Ag, UCx    Thank you for this consult. ID will continue to follow. Please call or page with any questions/concerns.    Josh Foote DO  [facility]     Greater than 55 minutes were spent obtaining a detailed interval history, detailed exam, evaluating this high complexity case with significant medical decision making including coordination of care,reviewing laboratory and imaging data and discussion withwith primary team and consultants        NOTE: Some or all of the note above was created with the use of dictation software, please note this dictation software can have anomalies in its ability to transcribe. Please contact me directly for anything that seems abnormal for clarification

## 2025-02-16 NOTE — H&P (VIEW-ONLY)
Internal Medicine  History & Physical        CHIEF COMPLAINT   Weakness      HISTORY OF PRESENT ILLNESS      This is a 89 y.o. 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 He has had several hospital admissions with recurrent GI bleeding,  likely diverticular in etiology versus stomal irritation which have been largely managed with transfusions. During his latest admission, he had a CTA which showed active rectosigmoid stump bleed, IR intervention was offered but due to high risk, family elected to not proceed with intervention. Multiple GOC discussion were had during this admission and family elected to not go further with procedures and monitor and transfuse as appropriate.     Per patient's son,  late yesterday evening, he started acting more erratic and confused and similar to how he is when he has a UTI. Due to persistence on symptoms they were more concerned and called EMS. Per EMS, on arrival patient's BP was soft in the 80s systolic. He denies any fevers, worsening SOB, altered appetite. He has had a cough for the past few weeks and has been producing white sputum. In terms of his recurrent, pleural effusions he does get thora's as an outpatient, he last got one when he was admitted to the hospital. Prior to this, he was getting it every 4-6 weeks. . At my time of assessment, patient was alert and oriented to place/person. He states that he is the hospital for a pneumonia and denies any SOB/pain.     Patient and family would not want aggressive surgeries and procedures but would be okay with thoracentesis for symptomatic help for his breathing. At baseline,  he lives at home alone but has continuous aids. He needs assistance to ambulate. His sons visit him frequently and live close by to him.       ED Course:   Vitals:  Temp:  [35.8 °C (96.4 °F)-36.6 °C (97.8 °F)] 36.2 °C (97.2 °F)  Heart Rate:  [59-68] 68  Resp:  [15-29] 23  BP: ()/(51-66) 108/66  SpO2:  [92 %-100 %] 100 %  Oxygen Therapy: None (Room air)    Labs  BMP: Na 141, K 4.3, BUN 26, Cr 1.5, bicarb 25, AG 5  CBC: WBC 4.69, hemoglobin 8.6, hematocrit 28.1, .1, platelets 94  Cardiac: hsTroponin 17.5,   Liver: AST 28, ALT 6, ALP 83, albumin 2.6, Tbili 0.6  VBG/AB.35/45/<20  Lactate: 1.5 --> 1.6   COVID/Flu/RSV - negative   UA: +1 leukocytes; 10 - 20 WBC in the urine   Blood cultures drawn    Imaging-   CT head - generalized atrophy with small vessel ischemic change   CT chest/abdomen/pelvis - Extrinsic narrowing of the right left proximal bronchi. There are bilateral large pleural effusions left is larger than right with near complete opacification left hemithorax. There is associated volume loss and or consolidation in the left lung and the right lower middle lobe. 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 Bilateral renal cysts limited evaluation due to streak artifact. Diverticulosis.There is high density material within the sigmoid colon lack of  noncontrast imaging limits evaluation for bleeding, correlate clinically    EKG: Atrial fibrillation with slow ventricular response with premature ventricular or aberrantly supraventricular conducted complexes  Right bundle branch block     Interventions:  S/p Zosyn, vancomycin   1.5 liters of NS,     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    Atrial fibrillation (CMS/HCC)     CHF (congestive heart failure) (CMS/HCC)      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)     Coronary artery disease     Disease of thyroid gland     Will catheter in place     6/25 not at present    GI (gastrointestinal bleed)     Hypertension     Myocardial infarction (CMS/HCC)     Orthostatic hypotension     TIA (transient ischemic attack)        PSHx:  Past Surgical History   Procedure Laterality Date    cataract extraction right eye with implant and limbal relaxing incision Right 7/18/2024    Performed by Hayder Moses MD at  OR [facility]    Cataract extraction w/ intraocular lens implant Left     Cataract extraction with LRI and anterior vitrectomy left eye Left 11/16/2023    Performed by Hayder Moses MD at  OR [facility]    Cholecystectomy      Colostomy      COLOSTOMY LAPAROSCOPIC N/A 8/3/2022    Performed by Mat Bryant MD at [facility] OR    Coronary artery bypass graft      DIAGNOSTIC FLEXIBLE SIGMOIDOSCOPY WITH COLLECTION OF SPECIMEN BY WASHING N/A 11/27/2024    Performed by David Holcomb MD at [facility] GI    DIAGNOSTIC FLEXIBLE SIGMOIDOSCOPY WITH COLLECTION OF SPECIMEN BY WASHING N/A 10/18/2024    Performed by Maine Lopez MD at [facility] GI    DIAGNOSTIC UPPER GASTROINTESTINAL ENDOSCOPY WITH COLLECTION OF SPECIMEN BY WASHING N/A 11/27/2024    Performed by David Holcomb MD at [facility]    FLEXIBLE SIGMOIDOSCOPY WITH BIOPSY N/A 10/23/2024    Performed by David Holcomb MD at [facility]    INCISION AND DRAINAGE WITH DEBRIDMENT OF BUTTOCK, AND PERINEUM N/A 8/2/2022    Performed by Mat Bryant MD at [facility] OR    Joint replacement Left     Knee    RIGHT HEART CATH N/A 8/4/2022    Performed by Cristal Lacey MD at [facility] CARDIAC CATH/EP    Thyroidectomy      WASHOUT OF PERINEAL WOUND, COLONIC LAVAGE N/A 8/3/2022    Performed by Mat Bryant MD at [facility] OR       PCP:   Zachery Hernandez MD    MEDICATIONS      Prior to Admission medications    Medication Sig Start Date End Date Taking? Authorizing Provider   aspirin 81 mg enteric coated tablet Take 81 mg by mouth  daily. 9 am    Clay Chambers MD   bimatoprost (LUMIGAN) 0.01 % ophthalmic drops Administer 1 drop into the left eye nightly.    Clay Chambers MD   calcium, as carbonate, (TUMS) 200 mg calcium (500 mg) chewable tablet Take 1 tablet by mouth daily. 11 am    Clay Chambers MD   carboxymethylcellulose (REFRESH PLUS) 0.5 % dropperette Administer 1 drop into both eyes every2 (two) hours while awake.    ProviderClay MD   cranberry extract 425 mg capsule Take 425 mg by mouth daily. 5 pm    Clay Chambers MD   cyanocobalamin (VITAMIN B12) 500 mcg tablet Take 500 mcg by mouth every morning. 9 am    Clya Chambers MD   famotidine (PEPCID) 10 mg tablet Take 10 mg by mouth daily. 11 am    Clay Chambers MD   ferrous sulfate 325 mg (65 mg iron) tablet Take 65 mg by mouth daily with breakfast. 11 am    Clay Chambers MD   finasteride (PROSCAR) 5 mg tablet Take 5 mg by mouth daily. 9 am    Clay Chambers MD   levothyroxine (SYNTHROID) 100 mcg tablet Take 100 mcg by mouth daily before breakfast. 7 am    Clay Chambers MD   melatonin 10 mg capsule Take 10 mg by mouth nightly. 9 pm    Clya Chambers MD   methenamine (HIPREX) 1 gram tablet Take 1 g by mouth daily before lunch. 11 am    Clay Chambers MD   metoprolol succinate XL (TOPROL-XL) 25 mg 24 hr tablet Take 12.5 mg by mouth daily with dinner. 5 pm    Clay Chambers MD   omeprazole (PriLOSEC) 20 mg capsule Take 20 mg by mouth daily. 11 am    Clay Chambers MD   potassium chloride (KLOR-CON) 10 mEq CR tablet Take 10 mEq by mouth 2 (two) times a day. 9 am and 5 pm    Clay Chambers MD   tamsulosin (FLOMAX) 0.4 mg capsule Take 0.4 mg by mouth every morning. 9 am    Clay Chambers MD   thiamine HCl (VITAMIN B1) 100 mg tablet Take 100 mg by mouth every morning. 9 am    Clay Chabmers MD    torsemide (DEMADEX) 10 mg tablet Take 10 mg by mouth every morning. 9 am    Provider, [facility] MD John   amLODIPine (NORVASC) 2.5 mg tablet amlodipine 2.5 mg tablet  7/22/22  John Chambers MD   apixaban (ELIQUIS) 2.5 mg tablet   7/22/22  John Chambers MD   hydrochlorothiazide (MICROZIDE) 12.5 mg capsule hydrochlorothiazide 12.5 mg capsule  7/22/22  ProviderJohn MD       Home medications were personally reviewed.    ALLERGIES      Jardiance [[email or web address]]    FAMILY HISTORY      No family history on file.    SOCIAL HISTORY      Social History     Socioeconomic History    Marital status:    Tobacco Use    Smoking status: Never    Smokeless tobacco: Never   Vaping Use    Vaping status: Never Used   Substance and Sexual Activity    Alcohol use: Not Currently     Comment: social    Drug use: Never    Sexual activity: Defer     Social Drivers of Health     Financial Resource Strain: Low Risk  (8/4/2023)    Overall Financial Resource Strain (CARDIA)     Difficulty of Paying Living Expenses: Not hard at all   Food Insecurity: No Food Insecurity (2/16/2025)    Hunger Vital Sign     Worried About Running Out of Food in the Last Year: Never true     Ran Out of Food in the Last Year: Never true   Transportation Needs: No Transportation Needs (2/6/2025)    PRAPARE - Transportation     Lack of Transportation (Medical): No     Lack of Transportation (Non-Medical): No   Housing Stability: Low Risk  (2/6/2025)    Housing Stability Vital Sign     Unable to Pay for Housing in the Last Year: No     Number of Times Moved in the Last Year: 0     Homeless in the Last Year: No       REVIEW OF SYSTEMS      Review of Systems   Constitutional:  Negative for chills, diaphoresis and fever.   HENT:  Negative for sore throat.    Cardiovascular:  Negative for chest pain, palpitations and leg swelling.   Gastrointestinal:  Negative for abdominal pain, blood in stool, constipation, diarrhea and rectal pain.    Skin:  Negative for pallor.   Neurological:  Negative for tremors, seizures, syncope, speech difficulty and headaches.       PHYSICAL EXAMINATION      Temp:  [35.8 °C (96.4 °F)-36.6 °C (97.8 °F)] 36.2 °C (97.2 °F)  Heart Rate:  [59-68] 62  Resp:  [15-29] 20  BP: ()/(51-66) 94/51  Body mass index is 21.43 kg/m².    Physical Exam  Constitutional:       General: He is not in acute distress.  HENT:      Mouth/Throat:      Mouth: Mucous membranes are moist.   Cardiovascular:      Rate and Rhythm: Normal rate and regular rhythm.      Pulses: Normal pulses.      Heart sounds: Normal heart sounds. No murmur heard.  Pulmonary:      Effort: Pulmonary effort is normal. No respiratory distress.      Breath sounds: No wheezing.      Comments: Reduce air entry in bilateral bases     Abdominal:      General: Abdomen is flat. Bowel sounds are normal.      Palpations: Abdomen is soft.      Comments: Stoma with brown stool output   No evidence of bleeding    Musculoskeletal:      Right lower leg: No edema.      Left lower leg: No edema.   Skin:     General: Skin is warm.      Capillary Refill: Capillary refill takes less than 2 seconds.      Findings: No bruising, lesion or rash.   Neurological:      General: No focal deficit present.      Mental Status: He is alert and oriented to person, place, and time. Mental status is at baseline.      Sensory: No sensory deficit.           LABS / IMAGING / EKG        Labs:  Results from last 7 days   Lab Units 02/16/25  0148   WBC K/uL 4.69   HEMOGLOBIN g/dL 8.6*   HEMATOCRIT % 28.1*   PLATELETS K/uL 94*      Results from last 7 days   Lab Units 02/16/25  0148   SODIUM mEQ/L 141   POTASSIUM mEQ/L 4.3   CHLORIDE mEQ/L 111*   CO2 mEQ/L 25   BUN mg/dL 26*   CREATININE mg/dL 1.5*   GLUCOSE mg/dL 100*   CALCIUM mg/dL 7.5*        Microbiology Data personally reviewed:  Microbiology Results       Procedure Component Value Units Date/Time    SARS-COV-2 (COVID-19)/ FLU A/B, AND RSV, PCR  Nasopharynx [[id number]]  (Normal) Collected: 02/16/25 0824    Specimen: Nasopharyngeal Swab from Nasopharynx Updated: 02/16/25 0938     SARS-CoV-2 (COVID-19) Negative     Influenza A Negative     Influenza B Negative     Respiratory Syncytial Virus Negative    Narrative:      Testing performed using real-time PCR for detection of COVID-19. EUA approved validation studies performed on site.     Blood Culture Blood, Venous [[id number]]  (Normal) Collected: 02/04/25 2021    Specimen: Blood, Venous Updated: 02/08/25 2300     Culture No growth at 96 hours    Blood Culture Blood, Venous [[id number]]  (Normal) Collected: 02/04/25 2021    Specimen: Blood, Venous Updated: 02/08/25 2300     Culture No growth at 96 hours            Imaging personally reviewed(does not include unread studies):  CT CHEST/ABDOMEN/PELVIS WITH IV CONTRAST    Result Date: 2/16/2025  IMPRESSION: Extrinsic narrowing of the right left proximal bronchi There are bilateral large pleural effusions left is larger than right with near complete opacification left hemithorax. There is associated volume loss and or consolidation in the left lung and the right lower middle lobe.. There is reflux of contrast into the hepatic veins with a prominent IVC this can be seen in right heart dysfunction.. 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 Bilateral renal cysts limited evaluation due to streak artifact... Diverticulosis.There is high density material within the sigmoid colon lack of noncontrast imaging limits evaluation for bleeding, correlate clinically    CT HEAD WITHOUT IV CONTRAST    Result Date: 2/16/2025  IMPRESSION: Generalized atrophy and small vessel ischemic change..     X-RAY CHEST 1 VIEW    Result Date: 2/16/2025  IMPRESSION: Please see comment.     CT ANGIOGRAPHY ABDOMEN PELVIS WITH AND WITHOUT IV CONTRAST    Result Date: 2/6/2025  IMPRESSION: 1.  Contrast extravasation/active bleeding in the proximal sigmoid colon within the rectosigmoid stump.   Circumferential wall thickening of the distal sigmoid colon and rectum with fat stranding, appearance of some degree of acute inflammation. 2.  Left mid descending colostomy.  Diffuse colonic diverticula. 3.  Partially imaged cardiomegaly, findings of right-sided heart dysfunction, pleural effusions, body wall edema. 4.  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 Report agrees with preliminary radiologist report. Interpreted by: Janee Lovell MD, Feb 06, 2025 02:45 AM Interpreted by: Janee Lovell MD, Feb 06, 2025 02:49 AM Discussed with: Nurse Fabienne, who confirmed to Ass SBD relaying report to Dr at 02:47 AM and the results were critically read back    IR THORACENTESIS    Result Date: 2/5/2025  IMPRESSION: Successful ultrasound-guided left thoracentesis with 800 mL removed and discarded. I certify that I have personally reviewed this examination and agree with Betsy Benitez's report. Gigi Berry M.D.    X-RAY CHEST 1 VIEW    Result Date: 2/5/2025  IMPRESSION: Decreased pleural effusions. No pneumothorax.      ECG/Telemetry  I have independently reviewed the telemetry. No events for the last 24 hours.       VTE Assessment: Padua    VTE Prophylaxis: Current anticoagulants:    None      Palliative Care Screen:    Code Status: DNR (A.N.D.)  Estimated discharge date: 2/20/2025  Discussed advanced care planning. The patient was not able or did not want to name a surrogate decision maker. Samy was unable to provide an ACP.        Assessment & Plan  Pneumonia  P/w confusion cough and present hospital admission   CT chest with   CT chest shows Extrinsic narrowing of the right left proximal bronchi. There are bilateral large pleural effusions left is larger than right with near complete opacification left hemithorax. There is associated volume loss and or consolidation in the left lung and the right lower middle lobe..  WBC 4.69  RSV/flu swab (winter) -  negative   Recent hospital admission, need to cover for HAP     - Meropenum and vancomycin  - F/u scx, bcx, legionella ag, MRSA nares  - ID following   Recurrent pleural effusion  Chronic heart failure (EF 40-45% in 09/2024) abnormal septal motion due to RV volume overload, RV massively dilated with severely decreased systolic function, torrential tricuspid regurgitation.   Complicated by recurrent left sided pleural effusions requiring thoracentesis.  Considerations for Pleurx and pleurodesis in past, none in place at this time.   Large bilateral pleural effusions noted on CT imaging, L > R.   No increase in oxygen requirements at this time.   Home med: torsemide 10mg daily.     - Continue metoprolol 25 mg twice daily.   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   Complicated by recurrent left sided pleural effusions requiring thoracentesis.    Home Meds Torsemide and     - hold torsemide   - continue on metoprolol   A-fib (CMS/HCC)  Hx of prior atrial fibrillation  Home regimen: metoprolol succinate XL 25mg for rate control, not on AC due to history of GI bleed.  [facility]  [id number] score: 5 points (age +2, CHF history +1, HTN history +1, prior NSTEMI +1)    - Continue metoprolol 25mg daily for rate control  - monitor on tele    CAD (coronary artery disease)  History of CABG in 1996.  Previously on plavix and aspirin; Plavix had been discontinued over the last several months due to recurrent GI bleeds. Continue on aspirin monotherapy.      -Continue aspirin 81 mg daily  Thrombocytopenia (CMS/HCC)  Platelet 94, chronic  thrombocytopenia    - trend CBC   Chronic anemia  P/w hgb 8.6, from baseline between 8-9  History of prior GI bleeds, not on anticoagulation for atrial fibrillation due to this history  Per family would not want any aggressive interventions for GI bleeding   Currently no blood in bowel movements.    - Check iron studies, ferritin   - Transfuse <7    Hypothyroidism  Home Medication 100 mcg levothyroxine     - continue   CKD (chronic kidney disease) stage 4, GFR 15-29 ml/min (CMS/HCC)  Baseline Cr 1.3 - 1.5   Presenting with Cr of 1.5   Around baseline    - Avoid nephrotoxins, dose meds renally, renal diet; Heparin for DVT prophylaxis  Debility  Chronic debility     - Palliative care consult       ATTENDING DOCUMENTATION  ALSO SEE ATTENDING ATTESTATION SECTION OF NOTE

## 2025-02-16 NOTE — ASSESSMENT & PLAN NOTE
Hx of prior atrial fibrillation  Home regimen: metoprolol succinate XL 25mg for rate control, not on AC due to history of GI bleed.  Follows with Dr. Roshan Jeffrey at [facility]  [id number] score: 5 points (age +2, CHF history +1, HTN history +1, prior NSTEMI +1)    - Continue metoprolol 25mg daily for rate control  - monitor on tele

## 2025-02-16 NOTE — ASSESSMENT & PLAN NOTE
Chronic heart failure (EF 40-45% in 09/2024) abnormal septal motion due to RV volume overload, RV massively dilated with severely decreased systolic function, torrential tricuspid regurgitation.   Complicated by recurrent left sided pleural effusions requiring thoracentesis.  Considerations for Pleurx and pleurodesis in past, none in place at this time.   Large bilateral pleural effusions noted on CT imaging, L > R.   No increase in oxygen requirements at this time.   Home med: torsemide 10mg daily.     - Continue metoprolol 25 mg twice daily.   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

## 2025-02-16 NOTE — DISCHARGE INSTRUCTIONS
Dear  Kassy,    You were admitted into the hospital for pleural effusion and pneumonia.  This means you had accumulation of fluid in your lungs restricting your ability to breathe as well as a bacterial infection in your lungs.  While in the hospital you were given antibiotics for infection and underwent a pleural centesis where the fluid was extracted from your left lung.  Following treatment your symptoms resolved.  You were discharged with oral antibiotics, Augmentin 875/125 mg to finish the course of antibiotics you received while in the hospital.      To do:  We would like you to follow up with your PCP (Dr. Hernandez) in 1-2 weeks, their telephone number is [phone].  We would like to start taking Augmentin 875/125 mg for 2 doses tomorrow at breakfast and dinner on 2/20/2025 to finish your 5-day total course of antibiotics.    Thank you for involving us in your care, we wish you the best of health in the future.

## 2025-02-16 NOTE — HOSPITAL COURSE
{Attestation List for Supervising/Collaborating Physicians:[id number]}      Summary: 89M with PMH CAD s/p CABG, HFrEF, torrential TR, afib (not on AC), recurrent L sided pleural effusion, recurrent GIB s/p colostomy, CKD, hypothyroidism, prior Justin's gangrene who presented with weakness, found to have large effusions and possible PNA.     On arrival to the ED, VS T 97.8F, HR 64, RR 20, /55, SpO2 96% on RA.   Labs- WBC 4.69, Hgb 8.6, PLT 94, Cr 1.5, VBG 7.36/45 with lactate 1.6, trop 19.7-->17.5, COVID/flu/RSV neg, UA 10-20 WBC, 1+ LE, neg nitrite, culture pending, legionella antigen negative. Blood cultures were drawn.   Imaging-   CXR slight worsening of background pulm edema and slight worsening of large L and mod R pleural effusions  CT head generalized atrophy and small vessel ischemic changes   CT chest/abd/pelvis extrinsic narrowing of proximal bronchi, bilateral large pleural effusions L>R with near complete opacification of L hemithorax; volume loss vs. 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 Ethan reports that pt has had difficult with sleep since his return from the hospital around 8 days ago. Overnight last night, [facility] reported that he was agitated and confused prompting them to bring him to the hospital. Ethan reports this AM in the ED, pt remained confused, but is now back to his baseline. Pt reports feeling well. He does endorse a mild cough with some sputum production. He denies any dyspnea or trouble breathing.     ROS-all other systems were reviewed and are negative      Physical Exam:   Gen appears younger than stated age, alert, NAD  HEENT sclera anicteric, conj non-injected.  MMM  CV regular rate, no mrg   Pulm  air movement appreciated bilateral upper lobes, little air movement heard in mid/lower lung campbell   Abd soft, NT, ND.   Ostomy in place   Ext no peripheral edema   Neuro No focal deficits. AAOX3, sharp    No Will  Psych  Full range affect.      Labs:  See HPI      Imaging: See HPI     EKG/Telemetry:  afib at 59 BPM, RBBB, slightly prolonged QT, diffuse TWI most pronounced in precordial leads, unchanged from 2/4/25 EKG      Assessment and plan:       #Possible PNA   Difficult to ascertain whether patient has pulmonary infectious process at play given obscuration of parenchyma by large pleural effusions, but there is some concern for underlying consolidation. Pt also with reported mild cough.   Plan:   -appreciate ID recs, continue vancomycin and transition pip-tazo to meropenem given prior history of ESBL UTI   -follow up MRSA nares, sputum culture, blood cultures, urine culture   -plan for thora as below, will obtain infectious studies     #Bilateral L>R pleural effusions   Noted on imaging with near complete opacification of L hemithorax. Reassuringly without respiratory distress or hypoxia.   Pt has known chronic pleural effusions with scheduled thoracenteses and was scheduled for thora with IR this week. The etiology of his recurrent effusions has been presumed to be due to heart failure. There has been discussion for PleurX  or pleurodesis given recurrent nature.   Plan:   -IR consult for therapeutic thoracentesis, send infectious studies  -monitor respiratory status     #Encephalopathy, resolved   Perhaps in the setting of acute infection, mild hypotension at home. Now completely resolved. CT head unremarkable for acute pathology.     #Biventricular heart failure  #Torrential TR   Known and chronic. Volume overloaded in the form of pleural effusions, but diuretics have not been able to manage this and gets regular thoracentesis as above.     #Recurrent GIB s/p colectomy w/ colostomy   #Anemia  Pt with multiple recurrent admissions for GIB, fortunately, does not appear to be bleeding this admission with stable Hgb. Recent  prior admission with active rectosigmoid stump bleed that family decided not to intervene upon. Monitor CBC closely. Continue home H2 blocker/PPI, ferrous sulfate.     #Afib-not on AC given recurrent life threatening GIB   #Thrombocytopenia-chronically low, continue to trend  #CAD s/p CABG-clopidogrel dc'd given recurrent GIB, now on ASA. Continue home metoprolol   #Hypothyroidism-home LT4   #CKD- at baseline, continue to trend   #History of Justin's gangrene-SGLT2i contraindicated          Rest of plans per note    DVT ppx: holding given recent GIB; place SCDs   Code status DNR     Dispo: 2-3 days     Ana Smith MD

## 2025-02-16 NOTE — ASSESSMENT & PLAN NOTE
History of CABG in 1996.  Previously on plavix and aspirin; Plavix had been discontinued over the last several months due to recurrent GI bleeds. Continue on aspirin monotherapy.      -Continue aspirin 81 mg daily

## 2025-02-16 NOTE — ASSESSMENT & PLAN NOTE
P/w confusion cough and present hospital admission   CT chest with   CT chest shows Extrinsic narrowing of the right left proximal bronchi. There are bilateral large pleural effusions left is larger than right with near complete opacification left hemithorax. There is associated volume loss and or consolidation in the left lung and the right lower middle lobe..  WBC 4.69  RSV/flu swab (winter) - negative   Recent hospital admission, need to cover for HAP     - Meropenum and vancomycin  - F/u scx, bcx, legionella ag, MRSA nares  - ID following

## 2025-02-16 NOTE — ASSESSMENT & PLAN NOTE
On admission creatinine 1.5 from a baseline of 1.3  Admission BUN 26 and potassium 4.3     - Trend creatinine with BMP  - Urine sodium and creatinine  - Renally dose medications, avoid nephrotoxins

## 2025-02-16 NOTE — PROGRESS NOTES
Spoke with patient's son, Marko, and confirmed with medication list from [facility] and/or patient's own pharmacy to complete the medication reconciliation.     Prior to admission medication list:    Current Outpatient Medications:     aspirin 81 mg enteric coated tablet, Take 81 mg by mouth daily. 9 am, Disp: , Rfl:     bimatoprost (LUMIGAN) 0.01 % ophthalmic drops, Administer 1 drop into the left eye nightly., Disp: , Rfl:     calcium, as carbonate, (TUMS) 200 mg calcium (500 mg) chewable tablet, Take 1 tablet by mouth daily. 11 am, Disp: , Rfl:     carboxymethylcellulose (REFRESH PLUS) 0.5 % dropperette, Administer 1 drop into both eyes every2 (two) hours while awake., Disp: , Rfl:     cranberry extract 425 mg capsule, Take 425 mg by mouth daily. 5 pm, Disp: , Rfl:     cyanocobalamin (VITAMIN B12) 500 mcg tablet, Take 500 mcg by mouth every morning. 9 am, Disp: , Rfl:     famotidine (PEPCID) 10 mg tablet, Take 10 mg by mouth daily. 11 am, Disp: , Rfl:     ferrous sulfate 325 mg (65 mg iron) tablet, Take 65 mg by mouth daily with breakfast. 11 am, Disp: , Rfl:     finasteride (PROSCAR) 5 mg tablet, Take 5 mg by mouth daily. 9 am, Disp: , Rfl:     levothyroxine (SYNTHROID) 100 mcg tablet, Take 100 mcg by mouth daily before breakfast. 7 am, Disp: , Rfl:     melatonin 10 mg capsule, Take 10 mg by mouth nightly. 9 pm, Disp: , Rfl:     methenamine (HIPREX) 1 gram tablet, Take 1 g by mouth daily before lunch. 11 am, Disp: , Rfl:     metoprolol succinate XL (TOPROL-XL) 25 mg 24 hr tablet, Take 12.5 mg by mouth daily with dinner. 5 pm, Disp: , Rfl:     omeprazole (PriLOSEC) 20 mg capsule, Take 20 mg by mouth daily. 11 am, Disp: , Rfl:     potassium chloride (KLOR-CON M) 20 mEq CR tablet, Take 10 mEq by mouth 2 (two) times a day. 9 am and 5 pm, Disp: , Rfl:     tamsulosin (FLOMAX) 0.4 mg capsule, Take 0.4 mg by mouth every morning. 9 am, Disp: , Rfl:     thiamine HCl (VITAMIN B1) 100 mg tablet, Take 100 mg by mouth every  morning. 9 am, Disp: , Rfl:     torsemide (DEMADEX) 10 mg tablet, Take 10 mg by mouth every morning. 9 am, Disp: , Rfl:      Comments about home medications:  -Patient's son states the patient takes potassium chloride 10 mEq twice daily using the 20 mEq tablets.  Patient should have a supply based on reduced usage.  -Patient's son states the patient takes torsemide 10 mg once daily, not 40 mg daily, as last prescribed.    -Escitalopram discontinued per 2/8/25 discharge summary.  -Patient's son states the clopidogrel was discontinued and replaced with aspirin 81 mg daily.    Compliance:   -Finasteride last filled 8/28/24 for a 90 day supply.  -No fills in [facility] for Lumigan ophthalmic.  -All other medications filled recently, no compliance concerns.

## 2025-02-16 NOTE — ASSESSMENT & PLAN NOTE
89 y.o. with a PMH of HFrEF, torrential TR, recurrent GIB, afib not on AC presenting with altered mental status, found to have PNA    - Code status: Do Not Resuscitate / Allow Natural Death  - Prognosis: TBD   - Advance Directives: OOH DNR on file   - Surrogate Decision Maker: previously named all 4 of his adult sons as surrogate; no hPOA so they are all healthcare representatives   - Capacity questionable   - Spiritual & Existential Needs: none identified

## 2025-02-16 NOTE — ASSESSMENT & PLAN NOTE
As per pleural effusions problem  Chronic heart failure (EF 40-45% in 09/2024) abnormal septal motion due to RV volume overload  RV massively dilated with severely decreased systolic function, torrential tricuspid regurgitation.   Complicated by recurrent left sided pleural effusions requiring thoracentesis.    Home Meds Torsemide and     - hold torsemide   - continue on metoprolol

## 2025-02-16 NOTE — ASSESSMENT & PLAN NOTE
Baseline Cr 1.3 - 1.5   Presenting with Cr of 1.5   Around baseline    - Avoid nephrotoxins, dose meds renally, renal diet; Heparin for DVT prophylaxis

## 2025-02-16 NOTE — ASSESSMENT & PLAN NOTE
On admission creatinine 1.5 from a baseline of 1.3  Admission BUN 26 and potassium 4.3    - Trend creatinine with BMP  - Urine sodium and creatinine  - Renal ultrasound  - Renally dose medications, avoid nephrotoxins  - Nephrology consult, appreciate recommendations

## 2025-02-16 NOTE — H&P
Internal Medicine  History & Physical        CHIEF COMPLAINT   Weakness      HISTORY OF PRESENT ILLNESS      This is a 89 y.o. 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 He has had several hospital admissions with recurrent GI bleeding,  likely diverticular in etiology versus stomal irritation which have been largely managed with transfusions. During his latest admission, he had a CTA which showed active rectosigmoid stump bleed, IR intervention was offered but due to high risk, family elected to not proceed with intervention. Multiple GOC discussion were had during this admission and family elected to not go further with procedures and monitor and transfuse as appropriate.     Per patient's son,  late yesterday evening, he started acting more erratic and confused and similar to how he is when he has a UTI. Due to persistence on symptoms they were more concerned and called EMS. Per EMS, on arrival patient's BP was soft in the 80s systolic. He denies any fevers, worsening SOB, altered appetite. He has had a cough for the past few weeks and has been producing white sputum. In terms of his recurrent, pleural effusions he does get thora's as an outpatient, he last got one when he was admitted to the hospital. Prior to this, he was getting it every 4-6 weeks. . At my time of assessment, patient was alert and oriented to place/person. He states that he is the hospital for a pneumonia and denies any SOB/pain.     Patient and family would not want aggressive surgeries and procedures but would be okay with thoracentesis for symptomatic help for his breathing. At baseline,  he lives at home alone but has continuous aids. He needs assistance to ambulate. His sons visit him frequently and live close by to him.       ED Course:   Vitals:  Temp:  [35.8 °C (96.4 °F)-36.6 °C (97.8 °F)] 36.2 °C (97.2 °F)  Heart Rate:  [59-68] 68  Resp:  [15-29] 23  BP: ()/(51-66) 108/66  SpO2:  [92 %-100 %] 100 %  Oxygen Therapy: None (Room air)    Labs  BMP: Na 141, K 4.3, BUN 26, Cr 1.5, bicarb 25, AG 5  CBC: WBC 4.69, hemoglobin 8.6, hematocrit 28.1, .1, platelets 94  Cardiac: hsTroponin 17.5,   Liver: AST 28, ALT 6, ALP 83, albumin 2.6, Tbili 0.6  VBG/AB.35/45/<20  Lactate: 1.5 --> 1.6   COVID/Flu/RSV - negative   UA: +1 leukocytes; 10 - 20 WBC in the urine   Blood cultures drawn    Imaging-   CT head - generalized atrophy with small vessel ischemic change   CT chest/abdomen/pelvis - Extrinsic narrowing of the right left proximal bronchi. There are bilateral large pleural effusions left is larger than right with near complete opacification left hemithorax. There is associated volume loss and or consolidation in the left lung and the right lower middle lobe. 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 Bilateral renal cysts limited evaluation due to streak artifact. Diverticulosis.There is high density material within the sigmoid colon lack of  noncontrast imaging limits evaluation for bleeding, correlate clinically    EKG: Atrial fibrillation with slow ventricular response with premature ventricular or aberrantly supraventricular conducted complexes  Right bundle branch block     Interventions:  S/p Zosyn, vancomycin   1.5 liters of NS,     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    Atrial fibrillation (CMS/HCC)     CHF (congestive heart failure) (CMS/HCC)      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)     Coronary artery disease     Disease of thyroid gland     Will catheter in place     6/25 not at present    GI (gastrointestinal bleed)     Hypertension     Myocardial infarction (CMS/HCC)     Orthostatic hypotension     TIA (transient ischemic attack)        PSHx:  Past Surgical History   Procedure Laterality Date    cataract extraction right eye with implant and limbal relaxing incision Right 7/18/2024    Performed by Hayder Moses MD at  OR [facility]    Cataract extraction w/ intraocular lens implant Left     Cataract extraction with LRI and anterior vitrectomy left eye Left 11/16/2023    Performed by Hayder Moses MD at  OR [facility]    Cholecystectomy      Colostomy      COLOSTOMY LAPAROSCOPIC N/A 8/3/2022    [place]formed by [place] [facility]    Coronary artery bypass graft      DIAGNOSTIC FLEXIBLE SIGMOIDOSCOPY WITH COLLECTION OF SPECIMEN BY WASHING N/A 11/27/2024    Performed by David Holcomb MD at [facility]    DIAGNOSTIC FLEXIBLE SIGMOIDOSCOPY WITH COLLECTION OF SPECIMEN BY WASHING N/A 10/18/2024    Performed by Maine Lopez MD at [facility]    DIAGNOSTIC UPPER GASTROINTESTINAL ENDOSCOPY WITH COLLECTION OF SPECIMEN BY WASHING N/A 11/27/2024    Performed by David Holcomb MD at [facility]    FLEXIBLE SIGMOIDOSCOPY WITH BIOPSY N/A 10/23/2024    Performed by David Holcomb MD at [facility]    INCISION AND DRAINAGE WITH DEBRIDMENT OF BUTTOCK, AND PERINEUM N/A 8/2/2022    Performed by Mat Bryant [place] at [facility]    Joint replacement Left     Knee    RIGHT HEART CATH N/A 8/4/2022    Performed by Cristal Lacey [place] [facility]    Thyroidectomy      WASHOUT OF PERINEAL WOUND, COLONIC LAVAGE N/A 8/3/2022    Performed by Mat Bryant [place] [facility]       PCP:   Zachery Hernandez MD    MEDICATIONS      Prior to Admission medications    Medication Sig Start Date End Date Taking? Authorizing Provider   aspirin 81 mg enteric coated tablet Take 81 mg by mouth  daily. 9 am    Clay Chambers MD   bimatoprost (LUMIGAN) 0.01 % ophthalmic drops Administer 1 drop into the left eye nightly.    Clay Chambers MD   calcium, as carbonate, (TUMS) 200 mg calcium (500 mg) chewable tablet Take 1 tablet by mouth daily. 11 am    Clay Chambers MD   carboxymethylcellulose (REFRESH PLUS) 0.5 % dropperette Administer 1 drop into both eyes every2 (two) hours while awake.    ProviderClay MD   cranberry extract 425 mg capsule Take 425 mg by mouth daily. 5 pm    Clay Chambers MD   cyanocobalamin (VITAMIN B12) 500 mcg tablet Take 500 mcg by mouth every morning. 9 am    Clay Chambers MD   famotidine (PEPCID) 10 mg tablet Take 10 mg by mouth daily. 11 am    Clay Chambers MD   ferrous sulfate 325 mg (65 mg iron) tablet Take 65 mg by mouth daily with breakfast. 11 am    Clay Chambers MD   finasteride (PROSCAR) 5 mg tablet Take 5 mg by mouth daily. 9 am    Clay Chambers MD   levothyroxine (SYNTHROID) 100 mcg tablet Take 100 mcg by mouth daily before breakfast. 7 am    Clay Chambers MD   melatonin 10 mg capsule Take 10 mg by mouth nightly. 9 pm    Clay Chambers MD   methenamine (HIPREX) 1 gram tablet Take 1 g by mouth daily before lunch. 11 am    Clay Chambers MD   metoprolol succinate XL (TOPROL-XL) 25 mg 24 hr tablet Take 12.5 mg by mouth daily with dinner. 5 pm    Clay Chambers MD   omeprazole (PriLOSEC) 20 mg capsule Take 20 mg by mouth daily. 11 am    Clay Chambers MD   potassium chloride (KLOR-CON) 10 mEq CR tablet Take 10 mEq by mouth 2 (two) times a day. 9 am and 5 pm    Clay Chambers MD   tamsulosin (FLOMAX) 0.4 mg capsule Take 0.4 mg by mouth every morning. 9 am    Clay Chambers MD   thiamine HCl (VITAMIN B1) 100 mg tablet Take 100 mg by mouth every morning. 9 am    Clay Chambers MD    torsemide (DEMADEX) 10 mg tablet Take 10 mg by mouth every morning. 9 am    Provider, [facility] MD John   amLODIPine (NORVASC) 2.5 mg tablet amlodipine 2.5 mg tablet  7/22/22  John Chambers MD   apixaban (ELIQUIS) 2.5 mg tablet   7/22/22  John Chambers MD   hydrochlorothiazide (MICROZIDE) 12.5 mg capsule hydrochlorothiazide 12.5 mg capsule  7/22/22  ProviderJohn MD       Home medications were personally reviewed.    ALLERGIES      Jardiance [[email or web address]]    FAMILY HISTORY      No family history on file.    SOCIAL HISTORY      Social History     Socioeconomic History    Marital status:    Tobacco Use    Smoking status: Never    Smokeless tobacco: Never   Vaping Use    Vaping status: Never Used   Substance and Sexual Activity    Alcohol use: Not Currently     Comment: social    Drug use: Never    Sexual activity: Defer     Social Drivers of Health     Financial Resource Strain: Low Risk  (8/4/2023)    Overall Financial Resource Strain (CARDIA)     Difficulty of Paying Living Expenses: Not hard at all   Food Insecurity: No Food Insecurity (2/16/2025)    Hunger Vital Sign     Worried About Running Out of Food in the Last Year: Never true     Ran Out of Food in the Last Year: Never true   Transportation Needs: No Transportation Needs (2/6/2025)    PRAPARE - Transportation     Lack of Transportation (Medical): No     Lack of Transportation (Non-Medical): No   Housing Stability: Low Risk  (2/6/2025)    Housing Stability Vital Sign     Unable to Pay for Housing in the Last Year: No     Number of Times Moved in the Last Year: 0     Homeless in the Last Year: No       REVIEW OF SYSTEMS      Review of Systems   Constitutional:  Negative for chills, diaphoresis and fever.   HENT:  Negative for sore throat.    Cardiovascular:  Negative for chest pain, palpitations and leg swelling.   Gastrointestinal:  Negative for abdominal pain, blood in stool, constipation, diarrhea and rectal pain.    Skin:  Negative for pallor.   Neurological:  Negative for tremors, seizures, syncope, speech difficulty and headaches.       PHYSICAL EXAMINATION      Temp:  [35.8 °C (96.4 °F)-36.6 °C (97.8 °F)] 36.2 °C (97.2 °F)  Heart Rate:  [59-68] 62  Resp:  [15-29] 20  BP: ()/(51-66) 94/51  Body mass index is 21.43 kg/m².    Physical Exam  Constitutional:       General: He is not in acute distress.  HENT:      Mouth/Throat:      Mouth: Mucous membranes are moist.   Cardiovascular:      Rate and Rhythm: Normal rate and regular rhythm.      Pulses: Normal pulses.      Heart sounds: Normal heart sounds. No murmur heard.  Pulmonary:      Effort: Pulmonary effort is normal. No respiratory distress.      Breath sounds: No wheezing.      Comments: Reduce air entry in bilateral bases     Abdominal:      General: Abdomen is flat. Bowel sounds are normal.      Palpations: Abdomen is soft.      Comments: Stoma with brown stool output   No evidence of bleeding    Musculoskeletal:      Right lower leg: No edema.      Left lower leg: No edema.   Skin:     General: Skin is warm.      Capillary Refill: Capillary refill takes less than 2 seconds.      Findings: No bruising, lesion or rash.   Neurological:      General: No focal deficit present.      Mental Status: He is alert and oriented to person, place, and time. Mental status is at baseline.      Sensory: No sensory deficit.           LABS / IMAGING / EKG        Labs:  Results from last 7 days   Lab Units 02/16/25  0148   WBC K/uL 4.69   HEMOGLOBIN g/dL 8.6*   HEMATOCRIT % 28.1*   PLATELETS K/uL 94*      Results from last 7 days   Lab Units 02/16/25  0148   SODIUM mEQ/L 141   POTASSIUM mEQ/L 4.3   CHLORIDE mEQ/L 111*   CO2 mEQ/L 25   BUN mg/dL 26*   CREATININE mg/dL 1.5*   GLUCOSE mg/dL 100*   CALCIUM mg/dL 7.5*        Microbiology Data personally reviewed:  Microbiology Results       Procedure Component Value Units Date/Time    SARS-COV-2 (COVID-19)/ FLU A/B, AND RSV, PCR  Nasopharynx [[id number]]  (Normal) Collected: 02/16/25 0824    Specimen: Nasopharyngeal Swab from Nasopharynx Updated: 02/16/25 0938     SARS-CoV-2 (COVID-19) Negative     Influenza A Negative     Influenza B Negative     Respiratory Syncytial Virus Negative    Narrative:      Testing performed using real-time PCR for detection of COVID-19. EUA approved validation studies performed on site.     Blood Culture Blood, Venous [[id number]]  (Normal) Collected: 02/04/25 2021    Specimen: Blood, Venous Updated: 02/08/25 2300     Culture No growth at 96 hours    Blood Culture Blood, Venous [[id number]]  (Normal) Collected: 02/04/25 2021    Specimen: Blood, Venous Updated: 02/08/25 2300     Culture No growth at 96 hours            Imaging personally reviewed(does not include unread studies):  CT CHEST/ABDOMEN/PELVIS WITH IV CONTRAST    Result Date: 2/16/2025  IMPRESSION: Extrinsic narrowing of the right left proximal bronchi There are bilateral large pleural effusions left is larger than right with near complete opacification left hemithorax. There is associated volume loss and or consolidation in the left lung and the right lower middle lobe.. There is reflux of contrast into the hepatic veins with a prominent IVC this can be seen in right heart dysfunction.. 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 Bilateral renal cysts limited evaluation due to streak artifact... Diverticulosis.There is high density material within the sigmoid colon lack of noncontrast imaging limits evaluation for bleeding, correlate clinically    CT HEAD WITHOUT IV CONTRAST    Result Date: 2/16/2025  IMPRESSION: Generalized atrophy and small vessel ischemic change..     X-RAY CHEST 1 VIEW    Result Date: 2/16/2025  IMPRESSION: Please see comment.     CT ANGIOGRAPHY ABDOMEN PELVIS WITH AND WITHOUT IV CONTRAST    Result Date: 2/6/2025  IMPRESSION: 1.  Contrast extravasation/active bleeding in the proximal sigmoid colon within the rectosigmoid stump.   Circumferential wall thickening of the distal sigmoid colon and rectum with fat stranding, appearance of some degree of acute inflammation. 2.  Left mid descending colostomy.  Diffuse colonic diverticula. 3.  Partially imaged cardiomegaly, findings of right-sided heart dysfunction, pleural effusions, body wall edema. 4.  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 Report agrees with preliminary radiologist report. Interpreted by: Janee Lovell MD, Feb 06, 2025 02:45 AM Interpreted by: Janee Lovell MD, Feb 06, 2025 02:49 AM Discussed with: Nurse Fabienne, who confirmed to Ass SBD relaying report to Dr at 02:47 AM and the results were critically read back    IR THORACENTESIS    Result Date: 2/5/2025  IMPRESSION: Successful ultrasound-guided left thoracentesis with 800 mL removed and discarded. I certify that I have personally reviewed this examination and agree with Betsy Benitez's report. Gigi Berry M.D.    X-RAY CHEST 1 VIEW    Result Date: 2/5/2025  IMPRESSION: Decreased pleural effusions. No pneumothorax.      ECG/Telemetry  I have independently reviewed the telemetry. No events for the last 24 hours.       VTE Assessment: Padua    VTE Prophylaxis: Current anticoagulants:    None      Palliative Care Screen:    Code Status: DNR (A.N.D.)  Estimated discharge date: 2/20/2025  Discussed advanced care planning. The patient was not able or did not want to name a surrogate decision maker. Samy was unable to provide an ACP.        Assessment & Plan  Pneumonia  P/w confusion cough and present hospital admission   CT chest with   CT chest shows Extrinsic narrowing of the right left proximal bronchi. There are bilateral large pleural effusions left is larger than right with near complete opacification left hemithorax. There is associated volume loss and or consolidation in the left lung and the right lower middle lobe..  WBC 4.69  RSV/flu swab (winter) -  negative   Recent hospital admission, need to cover for HAP     - Meropenum and vancomycin  - F/u scx, bcx, legionella ag, MRSA nares  - ID following   Recurrent pleural effusion  Chronic heart failure (EF 40-45% in 09/2024) abnormal septal motion due to RV volume overload, RV massively dilated with severely decreased systolic function, torrential tricuspid regurgitation.   Complicated by recurrent left sided pleural effusions requiring thoracentesis.  Considerations for Pleurx and pleurodesis in past, none in place at this time.   Large bilateral pleural effusions noted on CT imaging, L > R.   No increase in oxygen requirements at this time.   Home med: torsemide 10mg daily.     - Continue metoprolol 25 mg twice daily.   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   Complicated by recurrent left sided pleural effusions requiring thoracentesis.    Home Meds Torsemide and     - hold torsemide   - continue on metoprolol   A-fib (CMS/HCC)  Hx of prior atrial fibrillation  Home regimen: metoprolol succinate XL 25mg for rate control, not on AC due to history of GI bleed.  Follows with Dr[facility] Roshan [facility] at [facility]  [id number] score: 5 points (age +2, CHF history +1, HTN history +1, prior NSTEMI +1)    - Continue metoprolol 25mg daily for rate control  - monitor on tele    CAD (coronary artery disease)  History of CABG in 1996.  Previously on plavix and aspirin; Plavix had been discontinued over the last several months due to recurrent GI bleeds. Continue on aspirin monotherapy.      -Continue aspirin 81 mg daily  Thrombocytopenia (CMS/HCC)  Platelet 94, chronic  thrombocytopenia    - trend CBC   Chronic anemia  P/w hgb 8.6, from baseline between 8-9  History of prior GI bleeds, not on anticoagulation for atrial fibrillation due to this history  Per family would not want any aggressive interventions for GI bleeding   Currently no blood in bowel movements.    - Check iron studies, ferritin   - Transfuse <7    Hypothyroidism  Home Medication 100 mcg levothyroxine     - continue   CKD (chronic kidney disease) stage 4, GFR 15-29 ml/min (CMS/HCC)  Baseline Cr 1.3 - 1.5   Presenting with Cr of 1.5   Around baseline    - Avoid nephrotoxins, dose meds renally, renal diet; Heparin for DVT prophylaxis  Debility  Chronic debility     - Palliative care consult       ATTENDING DOCUMENTATION  ALSO SEE ATTENDING ATTESTATION SECTION OF NOTE

## 2025-02-16 NOTE — CONSULTS
Palliative Care Consult      This is Hospital Day: 1    Conversation/Goals of Care:  Goals remain life prolonging with limitations (DNR/DNI)    Assessment/Plan  Assessment & Plan  Debility  - Debility likely multifactorial in the setting of frailty, recurrent hospitalizations, HFrEF, recurrent GIB.    - Living situation prior to hospitalization home with 24 hour care .   - Baseline functional status is requires assistance with ADLs.   - Current Palliative Performance Status: 50%  - Baseline Palliative Performance Status: Unknown   - Frailty yes   - Patient remains high risk for further deterioration and functional decline.    Plan:  - continue current care  - Palliative care will continue to follow for complex medical decision making  - Patient should go home with palliative NP visits; would benefit from palliative nursing if he qualifies for home care   Palliative care by specialist  89 y.o. with a PMH of HFrEF, torrential TR, recurrent GIB, afib not on AC presenting with altered mental status, found to have PNA    - Code status: Do Not Resuscitate / Allow Natural Death  - Prognosis: TBD   - Advance Directives: OOH DNR on file   - Surrogate Decision Maker: previously named all 4 of his adult sons as surrogate; no hPOA so they are all healthcare representatives   - Capacity questionable   - Spiritual & Existential Needs: none identified         Reason for Consult:  Assistance with clarification of goals of care    HPI      Source: chart review and the patient    Samy Elena is an 89 y.o. male PMH below who presented with increased confusion and agitation - family thought it was a UTI as he behaved this way with UTI in the past; imaging and testing in the ER revealed bilateral L>R pleural effusions, lung collapse on L, being treated for PNA.    On my visit, patient awake and alert, enjoying some pudding. Son Ethan is at bedside. The patient denies trouble breathing or pain, says he feels pretty well. He says  he came in because he got stuck in his room and EMS took him in - Ethan reports the above history and says the patient is still a little off from baseline mentally but better than before.      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)     Coronary artery disease     Disease of thyroid gland     Will catheter in place     6/25 not at present    GI (gastrointestinal bleed)     Hypertension     Myocardial infarction (CMS/HCC)     Orthostatic hypotension     TIA (transient ischemic attack)        Past Surgical History  Past Surgical History   Procedure Laterality Date    cataract extraction right eye with implant and limbal relaxing incision Right 7/18/2024    Performed by Hayder Moses MD at  OR [facility]    Cataract extraction w/ intraocular lens implant Left     Cataract extraction with LRI and anterior vitrectomy left eye Left 11/16/2023    Performed by Hayder Moses MD at  OR [facility]    Cholecystectomy      Colostomy      COLOSTOMY LAPAROSCOPIC N/A 8/3/2022    Performed by Mat Bryant MD at [facility]    Coronary artery bypass graft      DIAGNOSTIC FLEXIBLE SIGMOIDOSCOPY WITH COLLECTION OF SPECIMEN BY WASHING N/A 11/27/2024    Performed by David Holcomb MD at [facility]    DIAGNOSTIC FLEXIBLE SIGMOIDOSCOPY WITH COLLECTION OF SPECIMEN BY WASHING N/A 10/18/2024    Performed by Maine Lopez MD at [facility]    DIAGNOSTIC UPPER GASTROINTESTINAL ENDOSCOPY WITH COLLECTION OF SPECIMEN BY WASHING N/A 11/27/2024    Performed by David Holcomb MD at [facility]    FLEXIBLE SIGMOIDOSCOPY WITH BIOPSY N/A 10/23/2024    Performed by David Holcomb MD at [facility]    INCISION AND DRAINAGE WITH DEBRIDMENT OF BUTTOCK, AND  PERINEUM N/A 8/2/2022    [place]formed [place] [facility] [place]    [place] replacement Left     Knee    RIGHT HEART CATH N/A 8/4/2022    [place]formed by Cristal Lacey [place] [facility] [place]    Thyroidectomy      WASHOUT OF PERINEAL WOUND, COLONIC LAVAGE N/A 8/3/2022    [place]formed by [place] [facility] [place]       Jain:  Pentecostal  Islam / Spiritual:   no spiritual concerns identified    Review of Systems:  All other systems reviewed and negative except as noted in the HPI.    [place], [place]  [employer] (Outside records) query reviewed with no concerns.    Current Medications:  Current Facility-Administered Medications   Medication Dose Route Frequency Provider Last Rate Last Admin    aspirin enteric coated tablet 81 mg  81 mg oral Daily Imelda Altamirano MD   81 mg at 02/16/25 1335    calcium (as carbonate) (TUMS) chewable tablet 200 mg of elemental calcium  200 mg of elemental calcium oral Daily Imelda Altamirano MD   200 mg of elemental calcium at 02/16/25 1335    glucose chewable tablet 16-32 g of dextrose  16-32 g of dextrose oral PRN Imelda Altamirano MD        Or    dextrose 40 % oral gel 15-30 g of dextrose  15-30 g of dextrose oral PRN Imelda Altamirano MD        Or    glucagon (GLUCAGEN) injection 1 mg  1 mg intramuscular PRN Imelda Altamirano MD        Or    dextrose 50 % in water (D50) injection 12.5 g  25 mL intravenous PRN Imelda Altamirano MD        famotidine (PEPCID) tablet 10 mg  10 mg oral Daily Imelda Altamirano MD   10 mg at 02/16/25 1335    [START ON 2/17/2025] ferrous sulfate 300 mg (60 mg iron)/5 mL liquid 162 mg  162 mg oral Daily with breakfast Imelda Altamirano MD        finasteride (PROSCAR) tablet 5 mg  5 mg oral Daily Imelda Altamirano MD   5 mg at 02/16/25 1335    latanoprost (XALATAN) 0.005 % ophthalmic solution 1 drop  1 drop Both Eyes Nightly Imelda Altamirano MD        [START ON  2/17/2025] levothyroxine (SYNTHROID) tablet 100 mcg  100 mcg oral Daily before breakfast Imelda Altamirano MD        melatonin capsule 10 mg  10 mg oral Nightly Imelda Altamirano MD        meropenem (MERREM) 2 g in sodium chloride 0.9 % 100 mL IVPB  2 g intravenous q12h INT Imelda Altamirano MD        metoprolol succinate ER (TOPROL-XL) pre-halved 24 hr ER tablet 12.5 mg  12.5 mg oral Daily with dinner Imelda Altamirano MD        pantoprazole (PROTONIX) tablet,delayed release (DR/EC) 20 mg  20 mg oral Daily Imelda Altamirano MD   20 mg at 02/16/25 1335    thiamine (VITAMIN B1) tablet 100 mg  100 mg oral q AM Imelda Altamirano MD   100 mg at 02/16/25 1335    [Provider Managed Hold] torsemide (DEMADEX) tablet 10 mg  10 mg oral q AM Imelda Altamirano MD        [START ON 2/17/2025] vancomycin 750 mg/100 mL IVPB in NSS  750 mg intravenous q24h INT Imelda Altamirano MD        vancomycin Therapy by Pharmacy Protocol 1 each  1 each Other evaluate daily Imelda Altamirano MD         Current Outpatient Medications   Medication Sig Dispense Refill    aspirin 81 mg enteric coated tablet Take 81 mg by mouth daily. 9 am      bimatoprost (LUMIGAN) 0.01 % ophthalmic drops Administer 1 drop into the left eye nightly.      calcium, as carbonate, (TUMS) 200 mg calcium (500 mg) chewable tablet Take 1 tablet by mouth daily. 11 am      carboxymethylcellulose (REFRESH PLUS) 0.5 % dropperette Administer 1 drop into both eyes every2 (two) hours while awake.      cranberry extract 425 mg capsule Take 425 mg by mouth daily. 5 pm      cyanocobalamin (VITAMIN B12) 500 mcg tablet Take 500 mcg by mouth every morning. 9 am      famotidine (PEPCID) 10 mg tablet Take 10 mg by mouth daily. 11 am      ferrous sulfate 325 mg (65 mg iron) tablet Take 65 mg by mouth daily with breakfast. 11 am      finasteride (PROSCAR) 5 mg tablet Take 5 mg by mouth daily. 9 am      levothyroxine (SYNTHROID)  100 mcg tablet Take 100 mcg by mouth daily before breakfast. 7 am      melatonin 10 mg capsule Take 10 mg by mouth nightly. 9 pm      methenamine (HIPREX) 1 gram tablet Take 1 g by mouth daily before lunch. 11 am      metoprolol succinate XL (TOPROL-XL) 25 mg 24 hr tablet Take 12.5 mg by mouth daily with dinner. 5 pm      omeprazole (PriLOSEC) 20 mg capsule Take 20 mg by mouth daily. 11 am      potassium chloride (KLOR-CON) 10 mEq CR tablet Take 10 mEq by mouth 2 (two) times a day. 9 am and 5 pm      tamsulosin (FLOMAX) 0.4 mg capsule Take 0.4 mg by mouth every morning. 9 am      thiamine HCl (VITAMIN B1) 100 mg tablet Take 100 mg by mouth every morning. 9 am      torsemide (DEMADEX) 10 mg tablet Take 10 mg by mouth every morning. 9 am         Allergies:  Allergies   Allergen Reactions    Jardiance [Empagliflozin] Other (see comments)     denise's gangrene         Objective    Physical Exam:   Physical Exam  Vitals reviewed.   Constitutional:       General: He is not in acute distress.     Appearance: He is ill-appearing.   HENT:      Head: Normocephalic.      Mouth/Throat:      Mouth: Mucous membranes are dry.   Eyes:      General: No scleral icterus.  Cardiovascular:      Rate and Rhythm: Normal rate.   Pulmonary:      Effort: No respiratory distress.   Abdominal:      General: There is no distension.   Musculoskeletal:      Comments: Frail appearing   Skin:     General: Skin is dry.   Neurological:      General: No focal deficit present.      Mental Status: He is alert.   Psychiatric:         Behavior: Behavior normal.         Vitals:  Vitals:    02/16/25 1225   BP: (!) 94/51   Pulse: 62   Resp: 20   Temp:    SpO2: 98%       Laboratory Studies:    I have reviewed the patient's pertinent labs to the time of note.  Significant abnormals are Cr elevated, BUN elevated, albumin low, anemia, thrombocytopenia .    Imaging and Other Studies:     I have independently reviewed the pertinent imaging to the time of note  and agree with reported results. Significant findings include: CT chest with L>R pleural effusions and L sided volume loss     I have independently reviewed the pertinent cardiac studies to the time of note and agree with reported results. Significant findings include: QTc 510    Shital Mi MD  Office [phone]

## 2025-02-16 NOTE — H&P (VIEW-ONLY)
Internal Medicine  History & Physical        CHIEF COMPLAINT   Weakness      HISTORY OF PRESENT ILLNESS      This is a 89 y.o. 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 He has had several hospital admissions with recurrent GI bleeding,  likely diverticular in etiology versus stomal irritation which have been largely managed with transfusions. During his latest admission, he had a CTA which showed active rectosigmoid stump bleed, IR intervention was offered but due to high risk, family elected to not proceed with intervention. Multiple GOC discussion were had during this admission and family elected to not go further with procedures and monitor and transfuse as appropriate.     Per patient's son,  late yesterday evening, he started acting more erratic and confused and similar to how he is when he has a UTI. Due to persistence on symptoms they were more concerned and called EMS. Per EMS, on arrival patient's BP was soft in the 80s systolic. He denies any fevers, worsening SOB, altered appetite. He has had a cough for the past few weeks and has been producing white sputum. In terms of his recurrent, pleural effusions he does get thora's as an outpatient, he last got one when he was admitted to the hospital. Prior to this, he was getting it every 4-6 weeks. . At my time of assessment, patient was alert and oriented to place/person. He states that he is the hospital for a pneumonia and denies any SOB/pain.     Patient and family would not want aggressive surgeries and procedures but would be okay with thoracentesis for symptomatic help for his breathing. At baseline,  he lives at home alone but has continuous aids. He needs assistance to ambulate. His sons visit him frequently and live close by to him.       ED Course:   Vitals:  Temp:  [35.8 °C (96.4 °F)-36.6 °C (97.8 °F)] 36.2 °C (97.2 °F)  Heart Rate:  [59-68] 68  Resp:  [15-29] 23  BP: ()/(51-66) 108/66  SpO2:  [92 %-100 %] 100 %  Oxygen Therapy: None (Room air)    Labs  BMP: Na 141, K 4.3, BUN 26, Cr 1.5, bicarb 25, AG 5  CBC: WBC 4.69, hemoglobin 8.6, hematocrit 28.1, .1, platelets 94  Cardiac: hsTroponin 17.5,   Liver: AST 28, ALT 6, ALP 83, albumin 2.6, Tbili 0.6  VBG/AB.35/45/<20  Lactate: 1.5 --> 1.6   COVID/Flu/RSV - negative   UA: +1 leukocytes; 10 - 20 WBC in the urine   Blood cultures drawn    Imaging-   CT head - generalized atrophy with small vessel ischemic change   CT chest/abdomen/pelvis - Extrinsic narrowing of the right left proximal bronchi. There are bilateral large pleural effusions left is larger than right with near complete opacification left hemithorax. There is associated volume loss and or consolidation in the left lung and the right lower middle lobe. 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 Bilateral renal cysts limited evaluation due to streak artifact. Diverticulosis.There is high density material within the sigmoid colon lack of  noncontrast imaging limits evaluation for bleeding, correlate clinically    EKG: Atrial fibrillation with slow ventricular response with premature ventricular or aberrantly supraventricular conducted complexes  Right bundle branch block     Interventions:  S/p Zosyn, vancomycin   1.5 liters of NS,     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    Atrial fibrillation (CMS/HCC)     CHF (congestive heart failure) (CMS/HCC)      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)     Coronary artery disease     Disease of thyroid gland     Will catheter in place     6/25 not at present    GI (gastrointestinal bleed)     Hypertension     Myocardial infarction (CMS/HCC)     Orthostatic hypotension     TIA (transient ischemic attack)        PSHx:  Past Surgical History   Procedure Laterality Date    cataract extraction right eye with implant and limbal relaxing incision Right 7/18/2024    Performed by Hayder Moses MD at  OR [facility]    Cataract extraction w/ intraocular lens implant Left     Cataract extraction with LRI and anterior vitrectomy left eye Left 11/16/2023    Performed by Hayder Moses MD at  OR [facility]    Cholecystectomy      Colostomy      COLOSTOMY LAPAROSCOPIC N/A 8/3/2022    [place] [facility]    Coronary artery bypass graft      DIAGNOSTIC FLEXIBLE SIGMOIDOSCOPY WITH COLLECTION OF SPECIMEN BY WASHING N/A 11/27/2024    Performed by [place]comb [place] [facility]    DIAGNOSTIC FLEXIBLE SIGMOIDOSCOPY WITH COLLECTION OF SPECIMEN BY WASHING N/A 10/18/2024    [place]formed by Maine Lopez [place] [facility]    DIAGNOSTIC UPPER GASTROINTESTINAL ENDOSCOPY WITH COLLECTION OF SPECIMEN BY WASHING N/A 11/27/2024    Performed by David [facility]comb [place] [facility]    FLEXIBLE SIGMOIDOSCOPY WITH BIOPSY N/A 10/23/2024    [place]formed by [facility] [facility]    INCISION AND DRAINAGE WITH DEBRIDMENT OF BUTTOCK, AND PERINEUM N/A 8/2/2022    [place] [facility]    Joint replacement Left     Knee    RIGHT HEART CATH N/A 8/4/2022    [place] Cristal [place] [facility] [place]    Thyroidectomy      WASHOUT OF PERINEAL WOUND, COLONIC LAVAGE N/A 8/3/2022    [place] [facility]       PCP:   Zachery Hernandez MD    MEDICATIONS      Prior to Admission medications    Medication Sig Start Date End Date Taking? Authorizing Provider   aspirin 81 mg enteric coated tablet Take 81 mg by mouth  daily. 9 am    Clay Chambers MD   bimatoprost (LUMIGAN) 0.01 % ophthalmic drops Administer 1 drop into the left eye nightly.    Clay Chambers MD   calcium, as carbonate, (TUMS) 200 mg calcium (500 mg) chewable tablet Take 1 tablet by mouth daily. 11 am    Clay Chambers MD   carboxymethylcellulose (REFRESH PLUS) 0.5 % dropperette Administer 1 drop into both eyes every2 (two) hours while awake.    ProviderClay MD   cranberry extract 425 mg capsule Take 425 mg by mouth daily. 5 pm    Clay Chambers MD   cyanocobalamin (VITAMIN B12) 500 mcg tablet Take 500 mcg by mouth every morning. 9 am    Clay Chambers MD   famotidine (PEPCID) 10 mg tablet Take 10 mg by mouth daily. 11 am    Clay Chambers MD   ferrous sulfate 325 mg (65 mg iron) tablet Take 65 mg by mouth daily with breakfast. 11 am    Clay Chambers MD   finasteride (PROSCAR) 5 mg tablet Take 5 mg by mouth daily. 9 am    Clay Chambers MD   levothyroxine (SYNTHROID) 100 mcg tablet Take 100 mcg by mouth daily before breakfast. 7 am    Clay Chambers MD   melatonin 10 mg capsule Take 10 mg by mouth nightly. 9 pm    Clay Chambers MD   methenamine (HIPREX) 1 gram tablet Take 1 g by mouth daily before lunch. 11 am    Clay Chambers MD   metoprolol succinate XL (TOPROL-XL) 25 mg 24 hr tablet Take 12.5 mg by mouth daily with dinner. 5 pm    Clay Chambers MD   omeprazole (PriLOSEC) 20 mg capsule Take 20 mg by mouth daily. 11 am    Clay Chambers MD   potassium chloride (KLOR-CON) 10 mEq CR tablet Take 10 mEq by mouth 2 (two) times a day. 9 am and 5 pm    Clay Chabmers MD   tamsulosin (FLOMAX) 0.4 mg capsule Take 0.4 mg by mouth every morning. 9 am    Clay Chambers MD   thiamine HCl (VITAMIN B1) 100 mg tablet Take 100 mg by mouth every morning. 9 am    Clay Chambers MD    torsemide (DEMADEX) 10 mg tablet Take 10 mg by mouth every morning. 9 am    Provider, [facility] MD John   amLODIPine (NORVASC) 2.5 mg tablet amlodipine 2.5 mg tablet  7/22/22  John Chambers MD   apixaban (ELIQUIS) 2.5 mg tablet   7/22/22  John Chambers MD   hydrochlorothiazide (MICROZIDE) 12.5 mg capsule hydrochlorothiazide 12.5 mg capsule  7/22/22  ProviderJohn MD       Home medications were personally reviewed.    ALLERGIES      Jardiance [empagliflozin]    FAMILY HISTORY      No family history on file.    SOCIAL HISTORY      Social History     Socioeconomic History    Marital status:    Tobacco Use    Smoking status: Never    Smokeless tobacco: Never   Vaping Use    Vaping status: Never Used   Substance and Sexual Activity    Alcohol use: Not Currently     Comment: social    Drug use: Never    Sexual activity: Defer     Social Drivers of Health     Financial Resource Strain: Low Risk  (8/4/2023)    Overall Financial Resource Strain (CARDIA)     Difficulty of Paying Living Expenses: Not hard at all   Food Insecurity: No Food Insecurity (2/16/2025)    Hunger Vital Sign     Worried About Running Out of Food in the Last Year: Never true     Ran Out of Food in the Last Year: Never true   Transportation Needs: No Transportation Needs (2/6/2025)    PRAPARE - Transportation     Lack of Transportation (Medical): No     Lack of Transportation (Non-Medical): No   Housing Stability: Low Risk  (2/6/2025)    Housing Stability Vital Sign     Unable to Pay for Housing in the Last Year: No     Number of Times Moved in the Last Year: 0     Homeless in the Last Year: No       REVIEW OF SYSTEMS      Review of Systems   Constitutional:  Negative for chills, diaphoresis and fever.   HENT:  Negative for sore throat.    Cardiovascular:  Negative for chest pain, palpitations and leg swelling.   Gastrointestinal:  Negative for abdominal pain, blood in stool, constipation, diarrhea and rectal pain.    Skin:  Negative for pallor.   Neurological:  Negative for tremors, seizures, syncope, speech difficulty and headaches.       PHYSICAL EXAMINATION      Temp:  [35.8 °C (96.4 °F)-36.6 °C (97.8 °F)] 36.2 °C (97.2 °F)  Heart Rate:  [59-68] 62  Resp:  [15-29] 20  BP: ()/(51-66) 94/51  Body mass index is 21.43 kg/m².    Physical Exam  Constitutional:       General: He is not in acute distress.  HENT:      Mouth/Throat:      Mouth: Mucous membranes are moist.   Cardiovascular:      Rate and Rhythm: Normal rate and regular rhythm.      Pulses: Normal pulses.      Heart sounds: Normal heart sounds. No murmur heard.  Pulmonary:      Effort: Pulmonary effort is normal. No respiratory distress.      Breath sounds: No wheezing.      Comments: Reduce air entry in bilateral bases     Abdominal:      General: Abdomen is flat. Bowel sounds are normal.      Palpations: Abdomen is soft.      Comments: Stoma with brown stool output   No evidence of bleeding    Musculoskeletal:      Right lower leg: No edema.      Left lower leg: No edema.   Skin:     General: Skin is warm.      Capillary Refill: Capillary refill takes less than 2 seconds.      Findings: No bruising, lesion or rash.   Neurological:      General: No focal deficit present.      Mental Status: He is alert and oriented to person, place, and time. Mental status is at baseline.      Sensory: No sensory deficit.           LABS / IMAGING / EKG        Labs:  Results from last 7 days   Lab Units 02/16/25  0148   WBC K/uL 4.69   HEMOGLOBIN g/dL 8.6*   HEMATOCRIT % 28.1*   PLATELETS K/uL 94*      Results from last 7 days   Lab Units 02/16/25  0148   SODIUM mEQ/L 141   POTASSIUM mEQ/L 4.3   CHLORIDE mEQ/L 111*   CO2 mEQ/L 25   BUN mg/dL 26*   CREATININE mg/dL 1.5*   GLUCOSE mg/dL 100*   CALCIUM mg/dL 7.5*        Microbiology Data personally reviewed:  Microbiology Results       Procedure Component Value Units Date/Time    SARS-COV-2 (COVID-19)/ FLU A/B, AND RSV, PCR  Nasopharynx [[id number]]  (Normal) Collected: 02/16/25 0824    Specimen: Nasopharyngeal Swab from Nasopharynx Updated: 02/16/25 0938     SARS-CoV-2 (COVID-19) Negative     Influenza A Negative     Influenza B Negative     Respiratory Syncytial Virus Negative    Narrative:      Testing performed using real-time PCR for detection of COVID-19. EUA approved validation studies performed on site.     Blood Culture Blood, Venous [[id number]]  (Normal) Collected: 02/04/25 2021    Specimen: Blood, Venous Updated: 02/08/25 2300     Culture No growth at 96 hours    Blood Culture Blood, Venous [[id number]]  (Normal) Collected: 02/04/25 2021    Specimen: Blood, Venous Updated: 02/08/25 2300     Culture No growth at 96 hours            Imaging personally reviewed(does not include unread studies):  CT CHEST/ABDOMEN/PELVIS WITH IV CONTRAST    Result Date: 2/16/2025  IMPRESSION: Extrinsic narrowing of the right left proximal bronchi There are bilateral large pleural effusions left is larger than right with near complete opacification left hemithorax. There is associated volume loss and or consolidation in the left lung and the right lower middle lobe.. There is reflux of contrast into the hepatic veins with a prominent IVC this can be seen in right heart dysfunction.. 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 Bilateral renal cysts limited evaluation due to streak artifact... Diverticulosis.There is high density material within the sigmoid colon lack of noncontrast imaging limits evaluation for bleeding, correlate clinically    CT HEAD WITHOUT IV CONTRAST    Result Date: 2/16/2025  IMPRESSION: Generalized atrophy and small vessel ischemic change..     X-RAY CHEST 1 VIEW    Result Date: 2/16/2025  IMPRESSION: Please see comment.     CT ANGIOGRAPHY ABDOMEN PELVIS WITH AND WITHOUT IV CONTRAST    Result Date: 2/6/2025  IMPRESSION: 1.  Contrast extravasation/active bleeding in the proximal sigmoid colon within the rectosigmoid stump.   Circumferential wall thickening of the distal sigmoid colon and rectum with fat stranding, appearance of some degree of acute inflammation. 2.  Left mid descending colostomy.  Diffuse colonic diverticula. 3.  Partially imaged cardiomegaly, findings of right-sided heart dysfunction, pleural effusions, body wall edema. 4.  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 Report agrees with preliminary radiologist report. Interpreted by: Janee Lovell MD, Feb 06, 2025 02:45 AM Interpreted by: Janee Lovell MD, Feb 06, 2025 02:49 AM Discussed with: Nurse Fabienne, who confirmed to Ass SBD relaying report to Dr at 02:47 AM and the results were critically read back    IR THORACENTESIS    Result Date: 2/5/2025  IMPRESSION: Successful ultrasound-guided left thoracentesis with 800 mL removed and discarded. I certify that I have personally reviewed this examination and agree with Betsy Benitez's report. Gigi Berry M.D.    X-RAY CHEST 1 VIEW    Result Date: 2/5/2025  IMPRESSION: Decreased pleural effusions. No pneumothorax.      ECG/Telemetry  I have independently reviewed the telemetry. No events for the last 24 hours.       VTE Assessment: Padua    VTE Prophylaxis: Current anticoagulants:    None      Palliative Care Screen:    Code Status: DNR (A.N.D.)  Estimated discharge date: 2/20/2025  Discussed advanced care planning. The patient was not able or did not want to name a surrogate decision maker. Samy was unable to provide an ACP.        Assessment & Plan  Pneumonia  P/w confusion cough and present hospital admission   CT chest with   CT chest shows Extrinsic narrowing of the right left proximal bronchi. There are bilateral large pleural effusions left is larger than right with near complete opacification left hemithorax. There is associated volume loss and or consolidation in the left lung and the right lower middle lobe..  WBC 4.69  RSV/flu swab (winter) -  negative   Recent hospital admission, need to cover for HAP     - Meropenum and vancomycin  - F/u scx, bcx, legionella ag, MRSA nares  - ID following   Recurrent pleural effusion  Chronic heart failure (EF 40-45% in 09/2024) abnormal septal motion due to RV volume overload, RV massively dilated with severely decreased systolic function, torrential tricuspid regurgitation.   Complicated by recurrent left sided pleural effusions requiring thoracentesis.  Considerations for Pleurx and pleurodesis in past, none in place at this time.   Large bilateral pleural effusions noted on CT imaging, L > R.   No increase in oxygen requirements at this time.   Home med: torsemide 10mg daily.     - Continue metoprolol 25 mg twice daily.   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   Complicated by recurrent left sided pleural effusions requiring thoracentesis.    Home Meds Torsemide and     - hold torsemide   - continue on metoprolol   A-fib (CMS/HCC)  Hx of prior atrial fibrillation  Home regimen: metoprolol succinate XL 25mg for rate control, not on AC due to history of GI bleed.  Follows with [facility] Roshan Jeffrey at [facility]  [id number] score: 5 points (age +2, CHF history +1, HTN history +1, prior NSTEMI +1)    - Continue metoprolol 25mg daily for rate control  - monitor on tele    CAD (coronary artery disease)  History of CABG in 1996.  Previously on plavix and aspirin; Plavix had been discontinued over the last several months due to recurrent GI bleeds. Continue on aspirin monotherapy.      -Continue aspirin 81 mg daily  Thrombocytopenia (CMS/HCC)  Platelet 94, chronic  thrombocytopenia    - trend CBC   Chronic anemia  P/w hgb 8.6, from baseline between 8-9  History of prior GI bleeds, not on anticoagulation for atrial fibrillation due to this history  Per family would not want any aggressive interventions for GI bleeding   Currently no blood in bowel movements.    - Check iron studies, ferritin   - Transfuse <7    Hypothyroidism  Home Medication 100 mcg levothyroxine     - continue   CKD (chronic kidney disease) stage 4, GFR 15-29 ml/min (CMS/HCC)  Baseline Cr 1.3 - 1.5   Presenting with Cr of 1.5   Around baseline    - Avoid nephrotoxins, dose meds renally, renal diet; Heparin for DVT prophylaxis  Debility  Chronic debility     - Palliative care consult       ATTENDING DOCUMENTATION  ALSO SEE ATTENDING ATTESTATION SECTION OF NOTE

## 2025-02-16 NOTE — PROGRESS NOTES
"Samy Jackson Kassy   [phone]    Your doctor has referred you for a   that requires the injection of an iodinated contrast material into your bloodstream. This iodinated contrast material, sometimes referred to as \"x-ray dye\" allows for better interpretation of the x-ray films or CT images and results in a more accurate interpretation of the examination.     Without the use of iodinated contrast (x-ray dye), the examination may be less informative and result in a suboptimal examination, and possibly a delay in diagnosis and, if needed, treatment.     The iodinated contrast material is given through a small needle or catheter placed into a vein, usually on the inside of the elbow, on the back of hand, or in a vein in the foot or lower leg.    The most common, though still rare, potential reaction to an intravenous contrast injection is an allergic-like reaction. Most allergic-like reactions are mild, though a small subset of people can have more severe reactions. Mild reactions include mild / scattered hives, itching, scratchy throat, sneezing and nasal congestion. More severe reactions include facial swelling, severe difficulty breathing, wheezing and anaphylactic shock. Those with a prior history allergic-like reaction to the same class of contrast media (such as CT contrast or MRI contrast) are at the highest risk for an allergic reaction.     If you believe you had an allergic reaction to contrast in the past, please let our staff know. We can determine if this increases your risk for a future reaction and provide steps to decrease the risk. This may delay your examination, but it decreases the risk of having a new and possibly more severe reaction to the contrast injection.    People with a history of prior allergic reactions to other substances (such as unrelated medications and food) and patients with a history of asthma have slightly increased risk for an allergic reaction from contrast material when " compared with the general population, but do not require to be pretreated prior to a contrast injection.    You should notify the physician, nurse or technologist if you have ever had any of these conditions or if you have any questions.

## 2025-02-16 NOTE — ASSESSMENT & PLAN NOTE
P/w hgb 8.6, from baseline between 8-9  History of prior GI bleeds, not on anticoagulation for atrial fibrillation due to this history  Per family would not want any aggressive interventions for GI bleeding   Currently no blood in bowel movements.    - Check iron studies, ferritin   - Transfuse <7

## 2025-02-16 NOTE — ASSESSMENT & PLAN NOTE
- Debility likely multifactorial in the setting of frailty, recurrent hospitalizations, HFrEF, recurrent GIB.    - Living situation prior to hospitalization home with 24 hour care .   - Baseline functional status is requires assistance with ADLs.   - Current Palliative Performance Status: 50%  - Baseline Palliative Performance Status: Unknown   - Frailty yes   - Patient remains high risk for further deterioration and functional decline.    Plan:  - continue current care  - Palliative care will continue to follow for complex medical decision making  - Patient should go home with palliative NP visits; would benefit from palliative nursing if he qualifies for home care

## 2025-02-16 NOTE — ED PROVIDER NOTES
Emergency Medicine Note  HPI   HISTORY OF PRESENT ILLNESS     Dictation errors and typographical errors may be present in the document below.  Jl in 07  RN hx/EHR reviewed  Hx obtained from pt and ems    Per EMS, patient sent to ED due to concern from caretaker that he has been confused and weak.  Patient denies any acute complaints.  He reports that his caretaker does not know him well and that is why he/she was concerned.  Patient denies any decreased p.o. intake/chest pain/shortness of breath/cough/fever.    Systolic blood pressure 88mmhg-110s mmhg and normal HR, with EMS. SBP runs on the low side at baseline  (sbp 110s)  Patient denies any decreased p.o. intake.  Denies any BRBPR or via ostomy.  Denies any bloody ostomy out;  No fall    Later in ED course, son arrived to the bedside.  Reports that last night the patient was very confused.  Talking about the pain back student loans which she has not done for many years.  The patient in the ER this morning, he felt the patient was still confused.  Much more animated than he typically would be.  Son thinks that his personality stiffer than usual and that is very typical for when the patient has a urinary tract infection.    The patient has not had any fever/chills.  No productive cough.  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)     Coronary artery disease     Disease of thyroid gland     Will catheter in place     6/25 not at present    GI (gastrointestinal bleed)     Hypertension     Myocardial infarction (CMS/HCC)     Orthostatic hypotension     TIA (transient ischemic attack)        Past Surgical History   Procedure Laterality Date    cataract extraction right eye with  implant and limbal relaxing incision Right 7/18/2024    Performed by Hayder Moses MD at  OR [facility]    Cataract extraction w/ intraocular lens implant Left     Cataract extraction with LRI and anterior vitrectomy left eye Left 11/16/2023    Performed by Hayder Moses MD at  OR [facility]    Cholecystectomy      Colostomy      COLOSTOMY LAPAROSCOPIC N/A 8/3/2022    Performed by Mat Bryant MD at [facility]    Coronary artery bypass graft      DIAGNOSTIC FLEXIBLE SIGMOIDOSCOPY WITH COLLECTION OF SPECIMEN BY WASHING N/A 11/27/2024    Performed by David Holcomb MD at [facility]    DIAGNOSTIC FLEXIBLE SIGMOIDOSCOPY WITH COLLECTION OF SPECIMEN BY WASHING N/A 10/18/2024    Performed by Maine Lopez MD at [facility]    DIAGNOSTIC UPPER GASTROINTESTINAL ENDOSCOPY WITH COLLECTION OF SPECIMEN BY WASHING N/A 11/27/2024    Performed by David Holcomb MD at [facility]    FLEXIBLE SIGMOIDOSCOPY WITH BIOPSY N/A 10/23/2024    Performed by David Holcomb MD at [facility]    INCISION AND DRAINAGE WITH DEBRIDMENT OF BUTTOCK, AND PERINEUM N/A 8/2/2022    Performed by Mat Bryant MD at [facility]    Joint replacement Left     Knee    RIGHT HEART CATH N/A 8/4/2022    Performed by Cristal Lacey MD at [facility]ARDIAC CATH/EP    Thyroidectomy      WASHOUT OF PERINEAL WOUND, COLONIC LAVAGE N/A 8/3/2022    Performed by Mat Bryant MD at [facility]       No family history on file.    Social History     Tobacco Use    Smoking status: Never    Smokeless tobacco: Never   Vaping Use    Vaping status: Never Used   Substance Use Topics    Alcohol use: Not Currently     Comment: social    Drug use: Never         Review of Systems   REVIEW OF SYSTEMS     Review of Systems      VITALS     ED Vitals      Date/Time Temp Pulse Resp BP SpO2 Who   02/16/25 0300 -- 62 17 99/51 98 % SM   02/16/25 0230 -- 61 16 91/54 95 % SM   02/16/25 0200 -- 59 18 106/54 96 % SM   02/16/25 0140 36.6 °C (97.8 °F) 64 20 105/55 96 % SM                          Physical Exam   PHYSICAL EXAM     Physical Exam  Vitals and nursing note reviewed.   Constitutional:       Appearance: He is well-developed.      Comments: Appears chronically ill   HENT:      Head: Normocephalic and atraumatic.      Right Ear: External ear normal.      Left Ear: External ear normal.   Eyes:      General: No scleral icterus.     Conjunctiva/sclera: Conjunctivae normal.   Neck:      Trachea: No tracheal deviation.   Cardiovascular:      Rate and Rhythm: Normal rate and regular rhythm.      Heart sounds: Murmur (3/6 sys) heard.      Comments: 2+ radial b/l  Pulmonary:      Comments: Dec'd bs b/l bases  Abdominal:      General: There is no distension.      Palpations: Abdomen is soft. There is no mass.      Tenderness: There is no abdominal tenderness. There is no guarding or rebound.   Genitourinary:     Comments: Uncircumsized penis;  No scrotal tenderness;  Musculoskeletal:         General: No tenderness. Normal range of motion.      Cervical back: Neck supple. No rigidity.      Comments: No calf assymetry;  No LE cords/tenderness   Skin:     General: Skin is dry.      Capillary Refill: Capillary refill takes 2 to 3 seconds.      Comments: Back- normal inspection  Fingers are mildly cool   Neurological:      Mental Status: He is alert.      Comments: Communicates clearly;  All ext antigravity;no facial assymetr;speech clear and communicates normally           PROCEDURES     Critical Care    Performed by: Watson Quinonez MD  Authorized by: Susanna Morgan DO    Critical care provider statement:     Critical care time (minutes):  35    Critical care time was exclusive of:  Separately billable procedures and treating other patients    Critical care was necessary to treat or prevent imminent or life-threatening deterioration of the following conditions: derlirium/multifocal pna.    Critical care was time spent personally by me on the following activities:  Ordering and performing  treatments and interventions, ordering and review of laboratory studies, ordering and review of radiographic studies, pulse oximetry, re-evaluation of patient's condition, review of old charts, obtaining history from patient or surrogate, examination of patient, evaluation of patient's response to treatment and development of treatment plan with patient or surrogate       DATA     Results       Procedure Component Value Units Date/Time    CBC and differential [[id number]]  (Abnormal) Collected: 02/16/25 0148    Specimen: Blood, Venous Updated: 02/16/25 0225     WBC 4.69 K/uL      RBC 2.78 M/uL      Hemoglobin 8.6 g/dL      Hematocrit 28.1 %      .1 fL      MCH 30.9 pg      MCHC 30.6 g/dL      RDW 22.2 %      Platelets 94 K/uL      MPV 9.8 fL     Comprehensive metabolic panel [[id number]]  (Abnormal) Collected: 02/16/25 0148    Specimen: Blood, Venous Updated: 02/16/25 0223     Sodium 141 mEQ/L      Potassium 4.3 mEQ/L      Comment: Results obtained on plasma. Plasma Potassium values may be up to 0.4 mEQ/L less than serum values. The differences may be greater for patients with high platelet or white cell counts.        Chloride 111 mEQ/L      CO2 25 mEQ/L      BUN 26 mg/dL      Creatinine 1.5 mg/dL      Glucose 100 mg/dL      Calcium 7.5 mg/dL      AST (SGOT) 28 IU/L      ALT (SGPT) 6 IU/L      Alkaline Phosphatase 83 IU/L      Total Protein 6.5 g/dL      Comment: Test performed on plasma which typically contains approximately 0.4 g/dL more protein than serum.        Albumin 2.6 g/dL      Bilirubin, Total 0.6 mg/dL      eGFR 43.1 mL/min/1.73m*2      Comment: Calculation based on the Chronic Kidney Disease Epidemiology Collaboration (CKD-EPI) equation refit without adjustment for race.        Anion Gap 5 mEQ/L     RAINBOW PINK [[id number]] Collected: 02/16/25 0148    Specimen: Blood, Venous Updated: 02/16/25 0155    [place] Draw Panel [[id number]] Collected: 02/16/25 0148    Specimen: Blood, Venous Updated:  "02/16/25 0155    Narrative:      The following orders were created for panel order [place] Draw Panel.  Procedure                               Abnormality         Status                     ---------                               -----------         ------                     RAINBOW PINK[[id number]]                                     In process                 RAINBOW LT BLUE[[id number]]                                  In process                   Please view results for these tests on the individual orders.    RAINBOW LT BLUE [[id number]] Collected: 02/16/25 0148    Specimen: Blood, Venous Updated: 02/16/25 0155            Imaging Results    None         ECG 12 lead    (Results Pending)       Scoring tools                                  ED Course & MDM   MDM / ED COURSE / CLINICAL IMPRESSION / DISPO     Medical Decision Making  Ddx including- cva/tia, acute toxic-metabolic process, acute infection, seizure, syncope, transient hypoxia (perhaps from inc'd pleural effusions)causing ams, among other pathology    Labs/imaging;    Data noted;    On re-eval, son at bedside--report pt is NOT at baseline ms. \"Personality is different\"/    Pt hypothermic on rectal temp;  Doubt cns infection;  S/o to Dr CARROLL- see ED course    Problems Addressed:  Delirium: acute illness or injury with systemic symptoms  Multifocal pneumonia: acute illness or injury with systemic symptoms    Amount and/or Complexity of Data Reviewed  Independent Historian:      Details: Ems , and son  External Data Reviewed: notes.     Details: 02/04/25  Assessment and plan:       #Acute blood loss anemia  Hgb remains stable today, no further episodes of rectal or stomal bleeding. Patient and family aware of possibility of recurrent bleed but prefer to avoid further procedures if able to be medically managed. Discharge home today. Patient has an appointment with heme/onc on Tuesday to set up outpatient transfusions. Continuing empiric PPI per GI recs.    "   #Chronic heart failure with reduced ejection fraction  #Torrential TR  Euvolemic on exam.      #CAD s/p CABG  ASA held, resume     #Afib  He is not on systemic AC due to his history of GIB.      #Recurrent left pleural effusion in the setting of heart failure  S/p IR thoracentesis this admission with 800 ccs removed. On room air.      #Thrombocytopenia  Chronic and stable to slightly decreased from recent hospitalizations.     #CKD stage 3a  Stable Cr.    Labs: ordered.  Radiology: ordered and independent interpretation performed.     Details: See ED course for additional information.    ECG/medicine tests: ordered and independent interpretation performed.     Details: See ED course for additional information.      Risk  Prescription drug management.  Decision regarding hospitalization.        ED Course as of 02/16/25 1042   Sun Feb 16, 2025   0341 09/2024 tte       · The left ventricular cavity size is small with mild left ventricular hypertrophy and mildly reduced systolic function. Estimated EF 40-45% by visual estimate. Abnormal septal motion due to RV volume overload. Unable to assess diastolic function due to technically difficult study. Low stroke volume (32 ml).      · The aortic valve is tricuspid. Aortic valve and root sclerosis. Mild aortic regurgitation.      · The mitral valve is thickened. Mild mitral annular calcification. Trace regurgitation.      · The left atrium is normal in size.      · The right ventricular cavity is massively dilated with severely decreased systolic function. Apical hypercontractility.      · The pulmonic valve is not well seen. There is trace pulmonic regurgitation.     · There is a 13 mm coaptation gap between the leaflets of the tricuspid valve resulting in torrential ( wide -open) tricuspid regurgitation (PISA EROA 2.4cm2, Rvol 96 mL). Tricuspid valve annulus is dilated and measures 6.8 cm in diameter. Unable to assess RVSP in the setting of torrential tricuspid  regurgitation.      · The right atrium is severely dilated.     · The IVC is massively dilated and collapses less than 50% with inspiration consistent with severely elevated right atrial pressure.      · No pericardial effusion. Large pleural effusion.      · Compared to previous echocardiogram from 3/28/2024, no significant change.   [JF]   0350 EKG contemporaneously interpreted by me:  afib, rate bradycardia,  v rate 59, non-specific st-tw changes, no stemi; new bradycardia   [JF]   0808 Ines Long- [phone];   [JF]   0811 S/o to Dr CARROLL w/ plan-  -f/u ct brain, covid/flu/rsv swab, vbg,, rectal temp    -per son lexapro recently dc'd,no new med additions or inc'd dosages [JF]   0912 I received signout from Dr. ABURTO pending further blood work, VBG, COVID swab and advanced imaging.  Patient has a normal VBG, awaiting CT scans   [RL]   1042 CT scan with multilobar pneumonia, started on happy to biotics and will admit to medicine team.    Epic secure chat of the [facility] team [RL]      ED Course User Index  [JF] Watson Quinonez MD  [RL] Susanna Morgan DO     Clinical Impression      None                 Watson Quinonez MD  02/16/25 5965

## 2025-02-17 ENCOUNTER — APPOINTMENT (INPATIENT)
Dept: RADIOLOGY | Facility: HOSPITAL | Age: 89
DRG: 193 | End: 2025-02-17
Attending: PHYSICIAN ASSISTANT
Payer: MEDICARE

## 2025-02-17 ENCOUNTER — APPOINTMENT (INPATIENT)
Dept: RADIOLOGY | Facility: HOSPITAL | Age: 89
DRG: 193 | End: 2025-02-17
Payer: MEDICARE

## 2025-02-17 LAB
APPEARANCE FLD: ABNORMAL
BACTERIA UR CULT: NORMAL
BODY FLD TYPE: ABNORMAL
COLOR FLD: ABNORMAL
GLUCOSE FLD-MCNC: 124 MG/DL
LDH FLD L TO P-CCNC: 67 U/L
LDH SERPL L TO P-CCNC: 164 IU/L (ref 98–271)
LYMPHOCYTES NFR FLD MANUAL: 88 %
MONOS+MACROS NFR FLD MANUAL: 12 %
PH FLD: 7.5 [PH]
PROT FLD-MCNC: <3 G/DL
RBC # SNV: ABNORMAL CELLS/CU MM (ref 0–10000)
SPECIMEN SOURCE: ABNORMAL
WBC # SNV AUTO: 256 CELLS/CU MM (ref 0–200)

## 2025-02-17 PROCEDURE — 63600000 HC DRUGS/DETAIL CODE: Mod: JZ

## 2025-02-17 PROCEDURE — 0W9B3ZZ DRAINAGE OF LEFT PLEURAL CAVITY, PERCUTANEOUS APPROACH: ICD-10-PCS | Performed by: RADIOLOGY

## 2025-02-17 PROCEDURE — 36100330 IR THORACENTESIS

## 2025-02-17 PROCEDURE — 25800000 HC PHARMACY IV SOLUTIONS

## 2025-02-17 PROCEDURE — 83986 ASSAY PH BODY FLUID NOS: CPT

## 2025-02-17 PROCEDURE — 25000000 HC PHARMACY GENERAL: Performed by: PHYSICIAN ASSISTANT

## 2025-02-17 PROCEDURE — 82945 GLUCOSE OTHER FLUID: CPT

## 2025-02-17 PROCEDURE — 63700000 HC SELF-ADMINISTRABLE DRUG

## 2025-02-17 PROCEDURE — 84157 ASSAY OF PROTEIN OTHER: CPT

## 2025-02-17 PROCEDURE — C1729 CATH, DRAINAGE: HCPCS

## 2025-02-17 PROCEDURE — 87206 SMEAR FLUORESCENT/ACID STAI: CPT

## 2025-02-17 PROCEDURE — 87015 SPECIMEN INFECT AGNT CONCNTJ: CPT

## 2025-02-17 PROCEDURE — 87102 FUNGUS ISOLATION CULTURE: CPT

## 2025-02-17 PROCEDURE — 71045 X-RAY EXAM CHEST 1 VIEW: CPT

## 2025-02-17 PROCEDURE — 83615 LACTATE (LD) (LDH) ENZYME: CPT

## 2025-02-17 PROCEDURE — 12000000 HC ROOM AND CARE MED/SURG

## 2025-02-17 PROCEDURE — 99233 SBSQ HOSP IP/OBS HIGH 50: CPT | Performed by: STUDENT IN AN ORGANIZED HEALTH CARE EDUCATION/TRAINING PROGRAM

## 2025-02-17 PROCEDURE — 89050 BODY FLUID CELL COUNT: CPT

## 2025-02-17 PROCEDURE — 88112 CYTOPATH CELL ENHANCE TECH: CPT

## 2025-02-17 PROCEDURE — 87070 CULTURE OTHR SPECIMN AEROBIC: CPT

## 2025-02-17 RX ORDER — LIDOCAINE HYDROCHLORIDE 10 MG/ML
INJECTION, SOLUTION INFILTRATION; PERINEURAL
Status: COMPLETED | OUTPATIENT
Start: 2025-02-17 | End: 2025-02-17

## 2025-02-17 RX ADMIN — ASPIRIN 81 MG: 81 TABLET, COATED ORAL at 08:52

## 2025-02-17 RX ADMIN — FINASTERIDE 5 MG: 5 TABLET, FILM COATED ORAL at 08:52

## 2025-02-17 RX ADMIN — METOPROLOL SUCCINATE 12.5 MG: 25 TABLET, EXTENDED RELEASE ORAL at 17:54

## 2025-02-17 RX ADMIN — SODIUM CHLORIDE 2 G: 9 INJECTION, SOLUTION INTRAVENOUS at 12:59

## 2025-02-17 RX ADMIN — LIDOCAINE HYDROCHLORIDE 10 ML: 10 INJECTION, SOLUTION INFILTRATION; PERINEURAL at 10:04

## 2025-02-17 RX ADMIN — SODIUM CHLORIDE 2 G: 9 INJECTION, SOLUTION INTRAVENOUS at 01:22

## 2025-02-17 RX ADMIN — PANTOPRAZOLE SODIUM 20 MG: 20 TABLET, DELAYED RELEASE ORAL at 08:52

## 2025-02-17 RX ADMIN — Medication 10 MG: at 22:08

## 2025-02-17 RX ADMIN — LEVOTHYROXINE SODIUM 100 MCG: 0.1 TABLET ORAL at 06:59

## 2025-02-17 RX ADMIN — PIPERACILLIN SODIUM AND TAZOBACTAM SODIUM 4.5 G: 4; .5 INJECTION, POWDER, LYOPHILIZED, FOR SOLUTION INTRAVENOUS at 22:08

## 2025-02-17 RX ADMIN — ANTACID TABLETS 200 MG OF ELEMENTAL CALCIUM: 500 TABLET, CHEWABLE ORAL at 08:52

## 2025-02-17 RX ADMIN — FAMOTIDINE 10 MG: 20 TABLET, FILM COATED ORAL at 08:52

## 2025-02-17 RX ADMIN — PIPERACILLIN SODIUM AND TAZOBACTAM SODIUM 4.5 G: 4; .5 INJECTION, POWDER, LYOPHILIZED, FOR SOLUTION INTRAVENOUS at 17:51

## 2025-02-17 RX ADMIN — VANCOMYCIN HYDROCHLORIDE 750 MG: 750 INJECTION, POWDER, LYOPHILIZED, FOR SOLUTION INTRAVENOUS at 10:44

## 2025-02-17 RX ADMIN — LATANOPROST 1 DROP: 50 SOLUTION OPHTHALMIC at 22:08

## 2025-02-17 RX ADMIN — THIAMINE HCL TAB 100 MG 100 MG: 100 TAB at 08:52

## 2025-02-17 NOTE — PROGRESS NOTES
Internal Medicine  ICU/CCU Daily Progress Note        Assessment & Plan  Pneumonia  P/w confusion cough and present hospital admission   CT chest with   CT chest shows Extrinsic narrowing of the right left proximal bronchi. There are bilateral large pleural effusions left is larger than right with near complete opacification left hemithorax. There is associated volume loss and or consolidation in the left lung and the right lower middle lobe..  WBC 4.69  RSV/flu swab (winter) - negative   Recent hospital admission, need to cover for HAP   MRSA nares -, Legionella ag -, bcx NGTD    - Meropenum   -Vancomycin d/c given, - MRSA nares  - F/u scx, bcx,   - ID following, rec'd stopping meropenum and restarting Zosyn given likely absence of UTI  Recurrent pleural effusion  Chronic heart failure (EF 40-45% in 09/2024) abnormal septal motion due to RV volume overload, RV massively dilated with severely decreased systolic function, torrential tricuspid regurgitation.   Complicated by recurrent left sided pleural effusions requiring thoracentesis.  Considerations for Pleurx and pleurodesis in past, family prefers conservative measures.   Large bilateral pleural effusions noted on CT imaging, L > R.   No increase in oxygen requirements at this time.   Home med: torsemide 10mg daily.     - Continue metoprolol 25 mg twice daily.   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   Complicated by recurrent left sided pleural effusions requiring thoracentesis.    Home Meds Torsemide and     - hold torsemide   - continue on metoprolol   A-fib (CMS/[facility])  Hx of prior atrial fibrillation  Home regimen:  metoprolol succinate XL 25mg for rate control, not on AC due to history of GI bleed.  Follows with Dr. Roshan Jeffrey at [facility]  [id number] score: 5 points (age +2, CHF history +1, HTN history +1, prior NSTEMI +1)    - Continue metoprolol 25mg daily for rate control  - monitor on tele    CAD (coronary artery disease)  History of CABG in 1996.  Previously on plavix and aspirin; Plavix had been discontinued over the last several months due to recurrent GI bleeds. Continue on aspirin monotherapy.      -Continue aspirin 81 mg daily  Thrombocytopenia (CMS/HCC)  Platelet 94, chronic thrombocytopenia    - trend CBC   Chronic anemia  P/w hgb 8.6, from baseline between 8-9  History of prior GI bleeds, not on anticoagulation for atrial fibrillation due to this history  Per family would not want any aggressive interventions for GI bleeding   Currently no blood in bowel movements.    - Iron 73, Ferritin 93, TIBC low at 265,   - Transfuse <7    Hypothyroidism  Home Medication 100 mcg levothyroxine     - continue   CKD (chronic kidney disease) stage 4, GFR 15-29 ml/min (CMS/HCC)  Baseline Cr 1.3 - 1.5   Presenting with Cr of 1.5   Around baseline    - Avoid nephrotoxins, dose meds renally, renal diet; Heparin for DVT prophylaxis  Debility  Chronic debility     - Palliative care consult     Palliative care by specialist    Confusion      SUBJECTIVE   This is a 89 y.o. year-old male admitted on 2/16/2025 with Confusion [R41.0].    Interval History: NAEON. NAD. AAOx3. Expresses feeling much better this morning and close to baseline. Not currently coughing as much as prior to admission.     OBJECTIVE   Vital signs in last 24 hours:  Temp:  [36.1 °C (97 °F)-36.4 °C (97.6 °F)] 36.4 °C (97.6 °F)  Heart Rate:  [62-89] 67  Resp:  [16-20] 18  BP: ()/(51-71) 90/64  SpO2:  [93 %-98 %] 95 %  Oxygen Therapy: None (Room air)      Weight & I/Os:  Weights (last 5 days)       Date/Time Weight    02/16/25 10:31:54 71.7 kg (158  lb)            Intake/Output Summary (Last 24 hours) at 2/17/2025 0659  Last data filed at 2/17/2025 0159  24 Hour Net Input/Output from 7AM Yesterday   Intake 1550 ml   Output 200 ml   Net 1350 ml            PHYSICAL EXAMINATION   Physical Exam  Constitutional:       Appearance: Normal appearance.   Eyes:      Extraocular Movements: Extraocular movements intact.      Pupils: Pupils are equal, round, and reactive to light.   Cardiovascular:      Pulses: Normal pulses.   Neurological:      General: No focal deficit present.      Mental Status: He is alert and oriented to person, place, and time. Mental status is at baseline.          LINES, CATHETERS, DRAINS, AIRWAYS, & WOUNDS   Lines, drains, airways, & wounds:  Peripheral IV (Adult) 02/16/25 Left Forearm (Active)   Number of days: 1       Peripheral IV (Adult) 02/16/25 Right Forearm (Active)   Number of days: 1       Wound Pressure Injury Posterior Sacrum (Active)   Number of days: 1       Wound Pressure Injury Left;Lower Back (Active)   Number of days: 1        LABS & IMAGING   Labs:      Results from last 7 days   Lab Units 02/16/25  0148   WBC K/uL 4.69   HEMOGLOBIN g/dL 8.6*   HEMATOCRIT % 28.1*   PLATELETS K/uL 94*       Results from last 7 days   Lab Units 02/16/25  0148   SODIUM mEQ/L 141   POTASSIUM mEQ/L 4.3   CHLORIDE mEQ/L 111*   CO2 mEQ/L 25   BUN mg/dL 26*   CREATININE mg/dL 1.5*   CALCIUM mg/dL 7.5*   ALBUMIN g/dL 2.6*   BILIRUBIN TOTAL mg/dL 0.6   ALK PHOS IU/L 83   ALT IU/L 6*   AST IU/L 28   GLUCOSE mg/dL 100*     Lab Results   Component Value Date    MG 2.1 02/08/2025    PHOS 2.9 08/23/2022     Lab Results   Component Value Date    PT 20.2 (H) 02/04/2025    INR 1.7 02/04/2025    PTT 32 02/04/2025       Microbiology Results (last 3 days)       Procedure Component Value - Date/Time    MRSA Screen, Nares Only Nose [[id number]]  (Normal) Collected: 02/16/25 1333    Lab Status: Final result Specimen: Nasal Swab from Nose Updated: 02/16/25 162     MRSA  DNA, Nares Negative     Comment: No methicillin resistant Staphylococcus aureus (MRSA) detected.       Legionella antigen, urine Urine, Clean Catch [[id number]]  (Normal) Collected: 02/16/25 1121    Lab Status: Final result Specimen: Urine, Clean Catch Updated: 02/16/25 1227     Legionella pneumophila Serogp 1 Ur Ag Negative    Blood Culture Blood, Venous [[id number]]  (Normal) Collected: 02/16/25 1115    Lab Status: Preliminary result Specimen: Blood, Venous Updated: 02/17/25 1301     Culture No growth at 18-24 hours    Blood Culture Blood, Venous [[id number]]  (Normal) Collected: 02/16/25 1115    Lab Status: Preliminary result Specimen: Blood, Venous Updated: 02/17/25 1301     Culture No growth at 18-24 hours    SARS-COV-2 (COVID-19)/ FLU A/B, AND RSV, PCR Nasopharynx [[id number]]  (Normal) Collected: 02/16/25 0824    Lab Status: Final result Specimen: Nasopharyngeal Swab from Nasopharynx Updated: 02/16/25 0938     SARS-CoV-2 (COVID-19) Negative     Influenza A Negative     Influenza B Negative     Respiratory Syncytial Virus Negative    Narrative:      Testing performed using real-time PCR for detection of COVID-19. EUA approved validation studies performed on site.     Urine culture Urine, Clean Catch [[id number]]  (Normal) Collected: 02/16/25 0422    Lab Status: Final result Specimen: Urine, Clean Catch Updated: 02/17/25 0909     Urine Culture No Growth (Limit of detection 1000 cfu/mL)            Imaging personally reviewed (does not include unread studies):  X-RAY CHEST 1 VIEW    Result Date: 2/17/2025  CLINICAL HISTORY: Status post left thoracentesis COMMENT: Portable erect AP view of the chest is obtained.  No pneumothorax appreciated.  Moderate facet possibly loculated left pleural effusion.  Small right pleural effusion.  The heart is enlarged.     IMPRESSION: No pneumothorax appreciated post thoracentesis. Pleural effusions    MEDS/DRIPS   Scheduled Meds:   aspirin  81 mg oral Daily    calcium (as carbonate)   200 mg of elemental calcium oral Daily    famotidine  10 mg oral Daily    ferrous sulfate  162 mg oral Daily with breakfast    finasteride  5 mg oral Daily    latanoprost  1 drop Both Eyes Nightly    levothyroxine  100 mcg oral Daily before breakfast    melatonin  10 mg oral Nightly    meropenem  2 g intravenous q12h INT    metoprolol succinate XL  12.5 mg oral Daily with dinner    pantoprazole  20 mg oral Daily    thiamine HCl  100 mg oral q AM    [Provider Managed Hold] torsemide  10 mg oral q AM     Continuous Infusions:  PRN Meds:.  glucose **OR** dextrose **OR** glucagon **OR** dextrose 50 % in water (D50)     ICU CHECKLIST   Lines:   Peripheral IV (Adult) 02/16/25 Left Forearm (Active)   Number of days: 1       Peripheral IV (Adult) 02/16/25 Right Forearm (Active)   Number of days: 1       Wound Pressure Injury Posterior Sacrum (Active)   Number of days: 1       Wound Pressure Injury Left;Lower Back (Active)   Number of days: 1        Code Status: DNR (A.N.D.)  Estimated discharge date: 2/20/2025       ATTENDING DOCUMENTATION  ALSO SEE ATTENDING ATTESTATION SECTION OF NOTE

## 2025-02-17 NOTE — ASSESSMENT & PLAN NOTE
P/w hgb 8.6, from baseline between 8-9  History of prior GI bleeds, not on anticoagulation for atrial fibrillation due to this history  Per family would not want any aggressive interventions for GI bleeding   Currently no blood in bowel movements.    - Iron 73, Ferritin 93, TIBC low at 265,   - Transfuse <7

## 2025-02-17 NOTE — ASSESSMENT & PLAN NOTE
Chronic heart failure (EF 40-45% in 09/2024) abnormal septal motion due to RV volume overload, RV massively dilated with severely decreased systolic function, torrential tricuspid regurgitation.   Complicated by recurrent left sided pleural effusions requiring thoracentesis.  Considerations for Pleurx and pleurodesis in past, family prefers conservative measures.   Large bilateral pleural effusions noted on CT imaging, L > R.   No increase in oxygen requirements at this time.   Home med: torsemide 10mg daily.     - Continue metoprolol 25 mg twice daily.   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

## 2025-02-17 NOTE — ASSESSMENT & PLAN NOTE
P/w confusion cough and present hospital admission   CT chest with   CT chest shows Extrinsic narrowing of the right left proximal bronchi. There are bilateral large pleural effusions left is larger than right with near complete opacification left hemithorax. There is associated volume loss and or consolidation in the left lung and the right lower middle lobe..  WBC 4.69  RSV/flu swab (winter) - negative   Recent hospital admission, need to cover for HAP   MRSA nares -, Legionella ag -, bcx NGTD    - Meropenum   -Vancomycin d/c given, - MRSA nares  - F/u scx, bcx,   - ID following, rec'd stopping meropenum and restarting Zosyn given likely absence of UTI

## 2025-02-17 NOTE — PROGRESS NOTES
Infectious Disease Progress Note    Patient Name: Samy Elena  MR#: [id number]  : 10/6/1930  Admission Date: 2025  Date: 25   Time: 3:00 PM   Author: Josh Foote DO        Antibiotics:  Anti-infectives (From admission, onward)      Start     Dose/Rate Route Frequency Ordered Stop    25 1310  meropenem (MERREM) 2 g in sodium chloride 0.9 % 100 mL IVPB         2 g  200 mL/hr over 30 Minutes intravenous Every 12 hours interval 25 1309                Subjective     Reports that the urinary frequency that he was experiencing has improved.  Denies any cough, shortness of breath and explains that that has improved significantly since undergoing thoracentesis.    Discussed care with RN at bedside.    Objective     Vital Signs:    Vitals:    25 1041 25 1100 25 1115 25 1230   BP: 106/66  111/64 90/64   BP Location: Right upper arm  Right upper arm Right upper arm   Patient Position: Lying  Lying Lying   Pulse: 89 70 67 67   Resp: 18  16 18   Temp: 36.4 °C (97.6 °F)      TempSrc: Oral      SpO2: 98%  95% 95%   Weight:       Height:         Temp (72hrs), Av.2 °C (97.2 °F), Min:35.8 °C (96.4 °F), Max:36.6 °C (97.8 °F)      Physical Exam:  General: non toxic, alert  HEENT: NC/AT/ anicteric sclera  Neck: supple, no meningismus  Cardiovascular: S1/S2 present.  No murmur, rub or gallop  Respiratory: clear to auscultation  GI/Abdomen: soft, NT/ND,  no peritoneal signs  : no perry  Extremities: no clubbing, cyanosis, or edema  JOINTS:  No swelling, erythema, or pain  Lymphatics: no lymphadenopathy  Neurology: no focal deficits  Psych: cooperative with exam  Skin: no rashes      Lines, Drains, Airways, Wounds:  Peripheral IV (Adult) 25 Left Forearm (Active)   Number of days: 1       Peripheral IV (Adult) 25 Right Forearm (Active)   Number of days: 1       Wound Pressure Injury Posterior Sacrum (Active)   Number of days: 1       Wound Pressure  Injury Left;Lower Back (Active)   Number of days: 1       Labs:    CBC Results         02/16/25 02/08/25 02/07/25     0148 1017 2006    WBC 4.69 4.88 5.12    RBC 2.78 2.62 2.57    HGB 8.6 7.9 7.9    HCT 28.1 25.1 24.8    .1 95.8 96.5    MCH 30.9 30.2 30.7    MCHC 30.6 31.5 31.9    PLT 94 92 106           Comment for PLT at 1017 on 02/08/25: RESULTS CHECKED    Comment for PLT at 2006 on 02/07/25: RESULTS OBTAINED AFTER VORTEXING TO ELIMINATE PLT CLUMPSSPECIMEN QUALITY CHECKED          CMP Results         02/16/25 02/08/25 02/07/25     0148 0550 0638     139 140    K 4.3 4.4 3.7    Cl 111 108 113    CO2 25 24 20    Glucose 100 75 74    BUN 26 25 22    Creatinine 1.5 1.3 1.3    Calcium 7.5 7.0 6.5    Anion Gap 5 7 7    AST 28 -- --    ALT 6 -- --    Albumin 2.6 -- --    EGFR 43.1 51.2 51.2           Comment for K at 0148 on 02/16/25: Results obtained on plasma. Plasma Potassium values may be up to 0.4 mEQ/L less than serum values. The differences may be greater for patients with high platelet or white cell counts.    Comment for EGFR at 0148 on 02/16/25: Calculation based on the Chronic Kidney Disease Epidemiology Collaboration (CKD-EPI) equation refit without adjustment for race.    Comment for EGFR at 0550 on 02/08/25: Calculation based on the Chronic Kidney Disease Epidemiology Collaboration (CKD-EPI) equation refit without adjustment for race.    Comment for EGFR at 0638 on 02/07/25: Calculation based on the Chronic Kidney Disease Epidemiology Collaboration (CKD-EPI) equation refit without adjustment for race.              MICRO:    Microbiology Results       Procedure Component Value Units Date/Time    MRSA Screen, Nares Only Nose [[id number]]  (Normal) Collected: 02/16/25 1333    Specimen: Nasal Swab from Nose Updated: 02/16/25 1625     MRSA DNA, Nares Negative    Legionella antigen, urine Urine, Clean Catch [[id number]]  (Normal) Collected: 02/16/25 1121    Specimen: Urine, Clean Catch Updated:  02/16/25 1227     Legionella pneumophila Serogp 1 Ur Ag Negative    Blood Culture Blood, Venous [[id number]]  (Normal) Collected: 02/16/25 1115    Specimen: Blood, Venous Updated: 02/17/25 1301     Culture No growth at 18-24 hours    Blood Culture Blood, Venous [[id number]]  (Normal) Collected: 02/16/25 1115    Specimen: Blood, Venous Updated: 02/17/25 1301     Culture No growth at 18-24 hours    SARS-COV-2 (COVID-19)/ FLU A/B, AND RSV, PCR Nasopharynx [[id number]]  (Normal) Collected: 02/16/25 0824    Specimen: Nasopharyngeal Swab from Nasopharynx Updated: 02/16/25 0938     SARS-CoV-2 (COVID-19) Negative     Influenza A Negative     Influenza B Negative     Respiratory Syncytial Virus Negative    Narrative:      Testing performed using real-time PCR for detection of COVID-19. EUA approved validation studies performed on site.     Urine culture Urine, Clean Catch [[id number]]  (Normal) Collected: 02/16/25 0422    Specimen: Urine, Clean Catch Updated: 02/17/25 0909     Urine Culture No Growth (Limit of detection 1000 cfu/mL)    Blood Culture Blood, Venous [[id number]]  (Normal) Collected: 02/04/25 2021    Specimen: Blood, Venous Updated: 02/08/25 2300     Culture No growth at 96 hours    Blood Culture Blood, Venous [[id number]]  (Normal) Collected: 02/04/25 2021    Specimen: Blood, Venous Updated: 02/08/25 2300     Culture No growth at 96 hours            Assessment  89 yo M hx CAD s/p CABG, HFrEF, A-fib not on AC 2/2 recurrent GI bleed s/p colostomy with rectal stump, recurrent left-sided pleural effusion requiring multiple thoracenteses, CKD, hypothyroidism, prior Justin's gangrene who presented to care for weakness. ID consulted given c/f PNA     ID-related problems     Sepsis 2/2 pneumonia v less likely UTI v likely SIRS in setting of prior GIB and debility-T 35.9.  RR 25.  WBC 4.  CT chest/A/P with extrinsic narrowing of right left small bronchi.  B/L large pleural effusions left larger than right with near complete  opacification of left hemithorax.  Associated volume loss and/or consolidation left lung and right lower middle lobe.  CT head with generalized atrophy and small vessel ischemic change. MRSA nares negative. Ucx negative to date (increasing suspicion that there is no UTI). UA WBC 10-20. Pleural fluid w/ 256 Wbcs (lymphocytic predominance and <10k RBCs). Negative urine legionella Ag  History of an ESBL organism in Ucx     Recommendations     Please assess for potential thora and if drainage performed please obtain aerobic/anaerobic, AFB, fungal cultures in addition to fluid analysis  OK to stop vanc given negative MRSA nares  Given no UTI, OK to stop meropenem and restart pip-tazo as dosed by pharmacy for now given possible PNA.     Please obtain sputum culture if and when develops productive cough  Fu Bcx, UCx    ID will continue to follow. Please call or page with any questions/concerns.    Josh Foote DO  [facility]

## 2025-02-17 NOTE — PROGRESS NOTES
Palliative Care Social Work  Progress Note    Samy Manuel Elena       Summary of visit: PCSW met with patient bedside, Samy is alert and oriented, welcoming to visit, shares he is feeling much better than the day before (today he had fluid removed from IR). 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 Samy is well supported by his family including his 4 sons, and many grandchildren, he shares the doctors are hopeful with some time on the abx he will improve and get out of the hospital. Samy shares he is hopeful to not come back to the hospital but if he has too he would. PCSW will continue to follow for ongoing support in the form of life review.     Recommendation/Follow up plan:   - PCSW will continue to follow    SHIELA Chan   2/17/2025

## 2025-02-17 NOTE — ASSESSMENT & PLAN NOTE
Hx of prior atrial fibrillation  Home regimen: metoprolol succinate XL 25mg for rate control, not on AC due to history of GI bleed.  Follows with Dr. Roshan Jeffrey at [facility]  [id number] score: 5 points (age +2, CHF history +1, HTN history +1, prior NSTEMI +1)    - Continue metoprolol 25mg daily for rate control  - monitor on tele

## 2025-02-17 NOTE — PROGRESS NOTES
Vancomycin Dosing by Pharmacy Consult Discontinued    IV Vancomycin Dosing by Pharmacy Protocol was discontinued.  Pharmacy will sign off and no longer dose vancomycin for this patient.    Jayme Go, RickD

## 2025-02-17 NOTE — OR SURGEON
Pre-Procedure patient identification:  I am the primary operating surgeon/proceduralist and I have reviewed the applicable pathology reports and radiology studies for this procedure. I have identified the patient on 02/17/25 at 9:53 AM SOPHIA Seals C

## 2025-02-18 LAB
CASE RPRT: NORMAL
FUNGUS STAIN: NORMAL
PATH REPORT.FINAL DX SPEC: NORMAL
PATH REPORT.GROSS SPEC: NORMAL
RHODAMINE-AURAMINE STN SPEC: NORMAL

## 2025-02-18 PROCEDURE — 63600000 HC DRUGS/DETAIL CODE: Mod: JZ

## 2025-02-18 PROCEDURE — 99233 SBSQ HOSP IP/OBS HIGH 50: CPT | Performed by: STUDENT IN AN ORGANIZED HEALTH CARE EDUCATION/TRAINING PROGRAM

## 2025-02-18 PROCEDURE — 25800000 HC PHARMACY IV SOLUTIONS

## 2025-02-18 PROCEDURE — 12000000 HC ROOM AND CARE MED/SURG

## 2025-02-18 PROCEDURE — 63700000 HC SELF-ADMINISTRABLE DRUG

## 2025-02-18 RX ADMIN — PIPERACILLIN SODIUM AND TAZOBACTAM SODIUM 4.5 G: 4; .5 INJECTION, POWDER, LYOPHILIZED, FOR SOLUTION INTRAVENOUS at 21:04

## 2025-02-18 RX ADMIN — PIPERACILLIN SODIUM AND TAZOBACTAM SODIUM 4.5 G: 4; .5 INJECTION, POWDER, LYOPHILIZED, FOR SOLUTION INTRAVENOUS at 15:16

## 2025-02-18 RX ADMIN — ASPIRIN 81 MG: 81 TABLET, COATED ORAL at 08:29

## 2025-02-18 RX ADMIN — THIAMINE HCL TAB 100 MG 100 MG: 100 TAB at 08:29

## 2025-02-18 RX ADMIN — FAMOTIDINE 10 MG: 20 TABLET, FILM COATED ORAL at 08:29

## 2025-02-18 RX ADMIN — Medication 10 MG: at 21:10

## 2025-02-18 RX ADMIN — PIPERACILLIN SODIUM AND TAZOBACTAM SODIUM 4.5 G: 4; .5 INJECTION, POWDER, LYOPHILIZED, FOR SOLUTION INTRAVENOUS at 08:29

## 2025-02-18 RX ADMIN — FINASTERIDE 5 MG: 5 TABLET, FILM COATED ORAL at 08:29

## 2025-02-18 RX ADMIN — LEVOTHYROXINE SODIUM 100 MCG: 0.1 TABLET ORAL at 08:28

## 2025-02-18 RX ADMIN — ANTACID TABLETS 200 MG OF ELEMENTAL CALCIUM: 500 TABLET, CHEWABLE ORAL at 08:28

## 2025-02-18 RX ADMIN — LATANOPROST 1 DROP: 50 SOLUTION OPHTHALMIC at 22:43

## 2025-02-18 RX ADMIN — PANTOPRAZOLE SODIUM 20 MG: 20 TABLET, DELAYED RELEASE ORAL at 08:28

## 2025-02-18 RX ADMIN — PIPERACILLIN SODIUM AND TAZOBACTAM SODIUM 4.5 G: 4; .5 INJECTION, POWDER, LYOPHILIZED, FOR SOLUTION INTRAVENOUS at 04:35

## 2025-02-18 RX ADMIN — FERROUS SULFATE 162 MG: 300 SOLUTION ORAL at 08:29

## 2025-02-18 NOTE — PROGRESS NOTES
Infectious Disease Progress Note    Patient Name: Samy Elena  MR#: [id number]  : 10/6/1930  Admission Date: 2025  Date: 25   Time: 2:46 PM   Author: Josh Foote DO        Antibiotics:  Anti-infectives (From admission, onward)      Start     Dose/Rate Route Frequency Ordered Stop    25 2100  piperacillin-tazobactam (ZOSYN) 4.5 g/100 mL IVPB in NSS         4.5 g  33.3 mL/hr over 3 Hours intravenous (extended interval) Every 6 hours interval 25 1542                Subjective     Reports improved cough/SOB. Denies pain or increased urinary frequency.     Objective     Vital Signs:    Vitals:    25 0234 25 0800 25 0937 25 1100   BP: (!) 89/59  (!) 85/46 95/65   BP Location: Right upper arm  Left upper arm    Patient Position: Lying  Lying    Pulse: 77 78 70 63   Resp:   18   Temp:  36.7 °C (98 °F)  36.6 °C (97.8 °F)   TempSrc:  Oral  Oral   SpO2: 98% 97% 96% 95%   Weight:       Height:         Temp (72hrs), Av.4 °C (97.5 °F), Min:35.8 °C (96.4 °F), Max:36.8 °C (98.2 °F)      Physical Exam:  General: non toxic, alert  HEENT: NC/AT/ anicteric sclera  Neck: supple, no meningismus  Cardiovascular: S1/S2 present.  No murmur, rub or gallop  Respiratory: clear to auscultation  GI/Abdomen: soft, NT/ND,  no peritoneal signs  : no perry  Extremities: no clubbing, cyanosis, or edema  JOINTS:  No swelling, erythema, or pain  Lymphatics: no lymphadenopathy  Neurology: no focal deficits  Psych: cooperative with exam  Skin: no rashes      Lines, Drains, Airways, Wounds:  Peripheral IV (Adult) 25 Left Forearm (Active)   Number of days: 1       Peripheral IV (Adult) 25 Right Forearm (Active)   Number of days: 1       Wound Pressure Injury Posterior Sacrum (Active)   Number of days: 1       Wound Pressure Injury Left;Lower Back (Active)   Number of days: 1       Labs:    CBC Results         25     0148 1017 2006    WBC  4.69 4.88 5.12    RBC 2.78 2.62 2.57    HGB 8.6 7.9 7.9    HCT 28.1 25.1 24.8    .1 95.8 96.5    MCH 30.9 30.2 30.7    MCHC 30.6 31.5 31.9    PLT 94 92 106           Comment for PLT at 1017 on 02/08/25: RESULTS CHECKED    Comment for PLT at 2006 on 02/07/25: RESULTS OBTAINED AFTER VORTEXING TO ELIMINATE PLT CLUMPSSPECIMEN QUALITY CHECKED          CMP Results         02/16/25 02/08/25 02/07/25     0148 0550 0638     139 140    K 4.3 4.4 3.7    Cl 111 108 113    CO2 25 24 20    Glucose 100 75 74    BUN 26 25 22    Creatinine 1.5 1.3 1.3    Calcium 7.5 7.0 6.5    Anion Gap 5 7 7    AST 28 -- --    ALT 6 -- --    Albumin 2.6 -- --    EGFR 43.1 51.2 51.2           Comment for K at 0148 on 02/16/25: Results obtained on plasma. Plasma Potassium values may be up to 0.4 mEQ/L less than serum values. The differences may be greater for patients with high platelet or white cell counts.    Comment for EGFR at 0148 on 02/16/25: Calculation based on the Chronic Kidney Disease Epidemiology Collaboration (CKD-EPI) equation refit without adjustment for race.    Comment for EGFR at 0550 on 02/08/25: Calculation based on the Chronic Kidney Disease Epidemiology Collaboration (CKD-EPI) equation refit without adjustment for race.    Comment for EGFR at 0638 on 02/07/25: Calculation based on the Chronic Kidney Disease Epidemiology Collaboration (CKD-EPI) equation refit without adjustment for race.              MICRO:    Microbiology Results       Procedure Component Value Units Date/Time    MRSA Screen, Nares Only Nose [[id number]]  (Normal) Collected: 02/16/25 1333    Specimen: Nasal Swab from Nose Updated: 02/16/25 1625     MRSA DNA, Nares Negative    Legionella antigen, urine Urine, Clean Catch [[id number]]  (Normal) Collected: 02/16/25 1121    Specimen: Urine, Clean Catch Updated: 02/16/25 1227     Legionella pneumophila Serogp 1 Ur Ag Negative    Blood Culture Blood, Venous [[id number]]  (Normal) Collected: 02/16/25 1114     Specimen: Blood, Venous Updated: 02/17/25 1301     Culture No growth at 18-24 hours    Blood Culture Blood, Venous [[id number]]  (Normal) Collected: 02/16/25 1115    Specimen: Blood, Venous Updated: 02/17/25 1301     Culture No growth at 18-24 hours    SARS-COV-2 (COVID-19)/ FLU A/B, AND RSV, PCR Nasopharynx [[id number]]  (Normal) Collected: 02/16/25 0824    Specimen: Nasopharyngeal Swab from Nasopharynx Updated: 02/16/25 0938     SARS-CoV-2 (COVID-19) Negative     Influenza A Negative     Influenza B Negative     Respiratory Syncytial Virus Negative    Narrative:      Testing performed using real-time PCR for detection of COVID-19. EUA approved validation studies performed on site.     Urine culture Urine, Clean Catch [[id number]]  (Normal) Collected: 02/16/25 0422    Specimen: Urine, Clean Catch Updated: 02/17/25 0909     Urine Culture No Growth (Limit of detection 1000 cfu/mL)    Blood Culture Blood, Venous [[id number]]  (Normal) Collected: 02/04/25 2021    Specimen: Blood, Venous Updated: 02/08/25 2300     Culture No growth at 96 hours    Blood Culture Blood, Venous [[id number]]  (Normal) Collected: 02/04/25 2021    Specimen: Blood, Venous Updated: 02/08/25 2300     Culture No growth at 96 hours            Assessment  89 yo M hx CAD s/p CABG, HFrEF, A-fib not on AC 2/2 recurrent GI bleed s/p colostomy with rectal stump, recurrent left-sided pleural effusion requiring multiple thoracenteses, CKD, hypothyroidism, prior Justin's gangrene who presented to care for weakness. ID consulted given c/f PNA     ID-related problems     Sepsis 2/2 pneumonia v less likely UTI v likely SIRS in setting of prior GIB and debility-T 35.9.  RR 25.  WBC 4.  CT chest/A/P with extrinsic narrowing of right left small bronchi.  B/L large pleural effusions left larger than right with near complete opacification of left hemithorax.  Associated volume loss and/or consolidation left lung and right lower middle lobe.  CT head with generalized  atrophy and small vessel ischemic change. MRSA nares negative. Ucx negative to date (increasing suspicion that there is no UTI). UA WBC 10-20. Pleural fluid w/ 256 Wbcs (lymphocytic predominance and <10k RBCs). Negative urine legionella Ag  History of an ESBL organism in Ucx     Recommendations     C/w pip-tazo as dosed by pharmacy for now given possible PNA. Upon DC, OK to change to po amox/clav 875/125 BID to complete 5 day total course given ?PNA (02/16-02/20) given overall suspicion that resp symptoms more likely 2/2 pleural effusion than PNA.   Please obtain sputum culture if and when develops productive cough  Fu Bcx, UCx    ID will sign off at this time.  Discussed with primary team.  Please call or page if any new microbiologic data, change in clinical condition or with any questions/concerns.    Josh Foote DO  [facility]

## 2025-02-18 NOTE — PROGRESS NOTES
Internal Medicine  Inpatient Discharge Summary        BRIEF OVERVIEW   Admitting Provider: Billie Shelton MD  Attending Provider: Melquiades Villarreal MD Attending phys phone: [phone]    PCP: Zachery Hernandez [phone]    Admission Date: 2/16/2025  Discharge Date: 2/18/2025     DISCHARGE DIAGNOSES      Primary Discharge Diagnosis  No Principal Problem: There is no principal problem currently on the Problem List. Please update the Problem List and refresh.    Secondary Discharge Diagnoses  Active Hospital Problems    Diagnosis Date Noted    Chronic anemia 02/16/2025    Confusion 02/16/2025    CAD (coronary artery disease) 02/04/2025    Thrombocytopenia (CMS/HCC) 02/04/2025    Recurrent pleural effusion 10/15/2024    A-fib (CMS/[facility]) 07/28/2024    History of chronic CHF 07/02/2024    Pneumonia 03/26/2024    CKD (chronic kidney disease) stage 4, GFR 15-29 ml/min (CMS/HCC) 12/03/2023    Hypothyroidism 11/29/2023    Debility 08/10/2022    Palliative care by specialist 08/10/2022      Resolved Hospital Problems   No resolved problems to display.       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)    Hypertension    Atrial fibrillation (CMS/HCC)    Elevated troponin    Orthostatic hypotension    Hyperlipidemia    Hypothyroidism    Depression    CKD (chronic kidney disease) stage 4, GFR 15-29 ml/min (CMS/HCC)    Pressure ulcer    Colostomy present on admission (CMS/[facility])    Pneumonia    Multiple medical problems    History of recurrent UTIs    BMI 21.0-21.9, adult    GERD (gastroesophageal reflux disease)    Preop examination    At risk for obstructive sleep  apnea    Anemia due to chronic kidney disease    History of chronic CHF    Thrombocytopenia (CMS/HCC)    Glaucoma    Age-related cataract of right eye    Encephalopathy    HFrEF (heart failure with reduced ejection fraction) (CMS/[facility])    History of recurrent UTIs    A-fib (CMS/[facility])    Hypertension    Acute kidney injury superimposed on CKD (CMS/HCC)  (CMS/[facility])    Acute decompensated heart failure (CMS/[facility])    Dysphagia    GI bleeding    Compression fracture of lumbar vertebra (CMS/[facility])    Recurrent pleural effusion    Gastrointestinal hemorrhage, unspecified gastrointestinal hemorrhage type    Acute on chronic anemia    Rectal bleeding    HFrEF (heart failure with reduced ejection fraction) (CMS/[facility])    Atrial fibrillation (CMS/[facility])    CAD (coronary artery disease)    History of anxiety    Acute on chronic anemia    Hx of chronic kidney disease    Thrombocytopenia (CMS/[facility])    Chronic HFrEF (heart failure with reduced ejection fraction) (CMS/[facility])    CKD stage 3a, GFR 45-59 ml/min (CMS/[facility])    Confusion    Chronic anemia     SUMMARY OF HOSPITALIZATION      Presenting Problem/History of Present Illness  This is a 89 y.o. 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 He has had several hospital admissions with recurrent GI bleeding,  likely diverticular in etiology versus stomal irritation which have been largely managed with transfusions. During his latest admission, he had a CTA which showed active rectosigmoid stump bleed, IR intervention was offered but due to high risk, family elected to not proceed with intervention. Multiple [facility]  discussion were had during this admission and family elected to not go further with procedures and monitor and transfuse as appropriate.      Per patient's son,  late evening prior to admission, he started acting more erratic and confused and similar to how he is when he has a UTI. Due to persistence on symptoms they were more concerned and called EMS. Per EMS, on arrival patient's BP was soft in the 80s systolic. He denies any fevers, worsening SOB, altered appetite. He has had a cough for the past few weeks and has been producing white sputum. In terms of his recurrent, pleural effusions he does get thora's as an outpatient, he last got one when he was admitted to the hospital. Prior to this, he was getting it every 4-6 weeks. . At my time of assessment, patient was alert and oriented to place/person. He states that he is the hospital for a pneumonia and denies any SOB/pain.      Patient and family would not want aggressive surgeries and procedures but would be okay with thoracentesis for symptomatic help for his breathing. At baseline, he lives at home alone but has continuous aids. He needs assistance to ambulate. His sons visit him frequently and live close by to him.     Hospital Course  Pneumonia  Recurrent pleural effusion  P/w confusion, cough and present prior to hospital admission.  Patient presented without respiratory distress or hypoxia.  CT chest showed extrinsic narrowing of the right left proximal bronchi. b/l large pleural effusions L>G with near complete opacification left hemithorax. Associated volume loss and or consolidation in the left lung and the right lower middle lobe. Effusions deemed to be most likely in the setting of heart failure given hx of recurrent effusions.  Patient was started on vancomycin,Zosyn for coverage of PNA and potential UTI.  Zosyn switched to meropenem due to c/f ESBL UTI.  Vancomycin was d/c'd and meropenem was switched to Zosyn once UTI was ruled out. Infectious  workup including blood cultures, Legionella, MRSA nares, RSV/flu swab, were negative.  Additionally patient underwent ultrasound-guided left thoracentesis after which 900 mL were removed.  Patient discharged on oral antibiotic regimen of Augmentin 875/125 twice a day for 5 days total antibiotic course.    History of chronic CHF  Patient with history of chronic heart failure (EF 40-45% in 09/2024). Complicated by recurrent left sided pleural effusions requiring thoracentesis.  Patient was not deemed to be an active exacerbation of hospitalization.  Home torsemide was held and patient was continued on metoprolol 25 mg twice daily.  Given patient's hypotension, additional GDMT was not started.     A-fib (CMS/[facility])  Hx of prior atrial fibrillation with home regimen metoprolol succinate XL 25mg for rate control, not on AC due to history of GI bleed. Follows with Dr. Roshan [facility] at [facility].  Patient was continued metoprolol 25mg daily for rate control.     CAD (coronary artery disease)  History of CABG in 1996. Plavix had been discontinued over the last several months due to recurrent GI bleeds. Continued on aspirin 81 mg monotherapy.     Thrombocytopenia (CMS/[facility])  Platelet 94, chronic thrombocytopenia.  Stable this admission.     Chronic anemia  P/w hgb 8.6, from baseline between 8-9. History of prior GI bleeds, not on anticoagulation for atrial fibrillation due to this history. Per family would not want any aggressive interventions for GI bleeding.  No hematochezia or melena was appreciated during this admission and anemia was stable.     Hypothyroidism  Patient continued on 100 mcg levothyroxine.     CKD (chronic kidney disease) stage 4, GFR 15-29 ml/min (CMS/[facility])  Baseline Cr 1.3 - 1.5.  Patient's creatinine was at baseline during this admission      Exam on Day of Discharge  Physical Exam  Cardiovascular:      Rate and Rhythm: Normal rate and regular rhythm.      Heart sounds: Normal heart sounds.    Pulmonary:      Comments: Decreased breath sounds at bilateral lung bases  Abdominal:      General: Abdomen is flat.      Palpations: Abdomen is soft.   Musculoskeletal:      Right lower leg: No edema.      Left lower leg: No edema.   Neurological:      Mental Status: He is alert. Mental status is at baseline.       Consults During Admission  IP CONSULT TO INFECTIOUS DISEASE  IP CONSULT TO PAIN/PALLIATIVE CARE    DISCHARGE MEDICATIONS   {Discharge medications will not appear until discharge med rec is completed. Once complete then right-click and select Refresh Smartlink to display discharge medication list.:21261}     New, changed, or stopped medications from this admission:       Medication List        ASK your doctor about these medications      aspirin 81 mg enteric coated tablet  Take 81 mg by mouth daily. 9 am  Dose: 81 mg     calcium (as carbonate) 200 mg calcium (500 mg) chewable tablet  Commonly known as: TUMS  Take 1 tablet by mouth daily. 11 am  Dose: 1 tablet     carboxymethylcellulose 0.5 % dropperette  Commonly known as: REFRESH PLUS  Administer 1 drop into both eyes every2 (two) hours while awake.  Dose: 1 drop     cranberry extract 425 mg capsule  Take 425 mg by mouth daily. 5 pm  Dose: 425 mg     cyanocobalamin 500 mcg tablet  Commonly known as: VITAMIN B12  Take 500 mcg by mouth every morning. 9 am  Dose: 500 mcg     famotidine 10 mg tablet  Commonly known as: PEPCID  Take 10 mg by mouth daily. 11 am  Dose: 10 mg     ferrous sulfate 325 mg (65 mg iron) tablet  Take 65 mg by mouth daily with breakfast. 11 am  Dose: 65 mg     finasteride 5 mg tablet  Commonly known as: PROSCAR  Take 5 mg by mouth daily. 9 am  Dose: 5 mg     levothyroxine 100 mcg tablet  Commonly known as: SYNTHROID  Take 100 mcg by mouth daily before breakfast. 7 am  Dose: 100 mcg     LUMIGAN 0.01 % ophthalmic drops  Administer 1 drop into the left eye nightly.  Dose: 1 drop  Generic drug: bimatoprost     melatonin 10 mg  capsule  Take 10 mg by mouth nightly. 9 pm  Dose: 10 mg     methenamine 1 gram tablet  Commonly known as: HIPREX  Take 1 g by mouth daily before lunch. 11 am  Dose: 1 g     metoprolol succinate XL 25 mg 24 hr tablet  Commonly known as: TOPROL-XL  Take 12.5 mg by mouth daily with dinner. 5 pm  Dose: 12.5 mg     omeprazole 20 mg capsule  Commonly known as: PriLOSEC  Take 20 mg by mouth daily. 11 am  Dose: 20 mg     potassium chloride 20 mEq CR tablet  Commonly known as: KLOR-CON M  Take 10 mEq by mouth 2 (two) times a day. 9 am and 5 pm  Dose: 10 mEq     tamsulosin 0.4 mg capsule  Commonly known as: FLOMAX  Take 0.4 mg by mouth every morning. 9 am  Dose: 0.4 mg     thiamine HCl 100 mg tablet  Commonly known as: VITAMIN B1  Take 100 mg by mouth every morning. 9 am  Dose: 100 mg     torsemide 10 mg tablet  Commonly known as: DEMADEX  Take 10 mg by mouth every morning. 9 am  Dose: 10 mg              Non-Medication orders at discharge:   {Discharge non-med orders (select AFTER ordering discharge orders):11983}              PROCEDURES / LABS / IMAGING      Operative Procedures  US guided left thora     Other Procedures  N/a    Pertinent Labs  Results from last 7 days   Lab Units 02/16/25  0148   WBC K/uL 4.69   HEMOGLOBIN g/dL 8.6*   HEMATOCRIT % 28.1*   PLATELETS K/uL 94*     Results from last 7 days   Lab Units 02/16/25  0148   SODIUM mEQ/L 141   POTASSIUM mEQ/L 4.3   CHLORIDE mEQ/L 111*   CO2 mEQ/L 25   BUN mg/dL 26*   CREATININE mg/dL 1.5*   CALCIUM mg/dL 7.5*   ALBUMIN g/dL 2.6*   BILIRUBIN TOTAL mg/dL 0.6   ALK PHOS IU/L 83   ALT IU/L 6*   AST IU/L 28   GLUCOSE mg/dL 100*         Pertinent Imaging  IR THORACENTESIS    Result Date: 2/17/2025  IMPRESSION: Successful ultrasound-guided left thoracentesis with 900 mL removed and sent to the lab.  All components of the time-out, debriefing, and handling of the specimen were conducted as per the ASAP Specimen Protocol. I certify that I have personally reviewed this  examination and agree with Betsy Benitez's report. Goyo Burnham M.D.    X-RAY CHEST 1 VIEW    Result Date: 2/17/2025  IMPRESSION: No pneumothorax appreciated post thoracentesis. Pleural effusions    CT CHEST/ABDOMEN/PELVIS WITH IV CONTRAST    Result Date: 2/16/2025  IMPRESSION: Extrinsic narrowing of the right left proximal bronchi There are bilateral large pleural effusions left is larger than right with near complete opacification left hemithorax. There is associated volume loss and or consolidation in the left lung and the right lower middle lobe.. There is reflux of contrast into the hepatic veins with a prominent IVC this can be seen in right heart dysfunction.. 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 Bilateral renal cysts limited evaluation due to streak artifact... Diverticulosis.There is high density material within the sigmoid colon lack of noncontrast imaging limits evaluation for bleeding, correlate clinically    CT HEAD WITHOUT IV CONTRAST    Result Date: 2/16/2025  IMPRESSION: Generalized atrophy and small vessel ischemic change..     X-RAY CHEST 1 VIEW    Result Date: 2/16/2025  IMPRESSION: Please see comment.     CT ANGIOGRAPHY ABDOMEN PELVIS WITH AND WITHOUT IV CONTRAST    Result Date: 2/6/2025  IMPRESSION: 1.  Contrast extravasation/active bleeding in the proximal sigmoid colon within the rectosigmoid stump.  Circumferential wall thickening of the distal sigmoid colon and rectum with fat stranding, appearance of some degree of acute inflammation. 2.  Left mid descending colostomy.  Diffuse colonic diverticula. 3.  Partially imaged cardiomegaly, findings of right-sided heart dysfunction, pleural effusions, body wall edema. 4.  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 Report agrees with preliminary radiologist report. Interpreted by: Janee Lovell MD, Feb 06, 2025 02:45 AM Interpreted by: Janee Lovell MD, Feb 06, 2025 02:49  AM Discussed with: Nurse Loving, who confirmed to Ass SBD relaying report to Dr at 02:47 AM and the results were critically read back    IR THORACENTESIS    Result Date: 2/5/2025  IMPRESSION: Successful ultrasound-guided left thoracentesis with 800 mL removed and discarded. I certify that I have personally reviewed this examination and agree with Betsy Benitez's report. Gigi Berry M.D.    X-RAY CHEST 1 VIEW    Result Date: 2/5/2025  IMPRESSION: Decreased pleural effusions. No pneumothorax.      OUTPATIENT  FOLLOW-UP / REFERRALS / PENDING TESTS      Outpatient Follow-Up Appointments  Encounter Information    This patient does not currently have any appointments scheduled.         Referrals  No orders of the defined types were placed in this encounter.      Test Results Pending at Discharge  Pending Inpatient Labs       Order Current Status    AFB Culture Pleural Fluid, Left In process    AFB culture Pleural Fluid, Left In process    AFB stain Pleural Fluid, Left In process    Cytology, Fluids Pleural Fluid, Left In process    Fungal Culture Pleural Fluid, Left In process    Fungal Stain Pleural Fluid, Left In process    Fungal culture / smear Pleural Fluid, Left In process    Blood Culture Blood, Venous Preliminary result    Blood Culture Blood, Venous Preliminary result    Body Fluid Culture/Smear Pleural Fluid, Left Preliminary result            Important Issues to Address in Follow-Up  ***  DISCHARGE DISPOSITION      Disposition:      Code Status At Discharge: DNR (A.N.D.)    Physician Order for Life-Sustaining Treatment Document Status        No documents found                     ATTENDING DOCUMENTATION  ALSO SEE ATTENDING ATTESTATION SECTION OF NOTE

## 2025-02-18 NOTE — ASSESSMENT & PLAN NOTE
P/w confusion cough and present hospital admission   CT chest with   CT chest shows Extrinsic narrowing of the right left proximal bronchi. There are bilateral large pleural effusions left is larger than right with near complete opacification left hemithorax. There is associated volume loss and or consolidation in the left lung and the right lower middle lobe..  WBC 4.69  RSV/flu swab (winter) - negative   Recent hospital admission, need to cover for HAP   MRSA nares -, Legionella ag -, bcx NGTD    - Meropenum dc'd  -Vancomycin d/c given, - MRSA nares  - F/u scx, bcx, NGTD  - ID following, rec'd stopping meropenum and restarting Zosyn given likely absence of UTI  -F/u w/ ID for appropriate abx upon d/c

## 2025-02-18 NOTE — ASSESSMENT & PLAN NOTE
Hx of prior atrial fibrillation  Home regimen: metoprolol succinate XL 25mg for rate control, not on AC due to history of GI bleed.  Follows with Dr. Roshan Jeffrey at [facility]  [id number] score: 5 points (age +2, CHF history +1, HTN history +1, prior NSTEMI +1)    - Continue metoprolol 25mg daily for rate control  - monitor on tele

## 2025-02-18 NOTE — PROGRESS NOTES
Internal Medicine  ICU/CCU Daily Progress Note        Assessment & Plan  Pneumonia  P/w confusion cough and present hospital admission   CT chest with   CT chest shows Extrinsic narrowing of the right left proximal bronchi. There are bilateral large pleural effusions left is larger than right with near complete opacification left hemithorax. There is associated volume loss and or consolidation in the left lung and the right lower middle lobe..  WBC 4.69  RSV/flu swab (winter) - negative   Recent hospital admission, need to cover for HAP   MRSA nares -, Legionella ag -, bcx NGTD    - Meropenum dc'd  -Vancomycin d/c given, - MRSA nares  - F/u scx, bcx, NGTD  - ID following, rec'd stopping meropenum and restarting Zosyn given likely absence of UTI  -F/u w/ ID for appropriate abx upon d/c  Recurrent pleural effusion  Chronic heart failure (EF 40-45% in 09/2024) abnormal septal motion due to RV volume overload, RV massively dilated with severely decreased systolic function, torrential tricuspid regurgitation.   Complicated by recurrent left sided pleural effusions requiring thoracentesis.  Considerations for Pleurx and pleurodesis in past, family prefers conservative measures.   Large bilateral pleural effusions noted on CT imaging, L > R.   No increase in oxygen requirements at this time.   Home med: torsemide 10mg daily.     - Continue metoprolol 25 mg twice daily.   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   Complicated by recurrent left sided pleural effusions requiring thoracentesis.    Home Meds Torsemide and     - hold torsemide   - continue on metoprolol   A-fib (CMS/[facility])  Hx  of prior atrial fibrillation  Home regimen: metoprolol succinate XL 25mg for rate control, not on AC due to history of GI bleed.  Follows with Dr. Roshan Jeffrey at [facility]  [id number] score: 5 points (age +2, CHF history +1, HTN history +1, prior NSTEMI +1)    - Continue metoprolol 25mg daily for rate control  - monitor on tele    CAD (coronary artery disease)  History of CABG in 1996.  Previously on plavix and aspirin; Plavix had been discontinued over the last several months due to recurrent GI bleeds. Continue on aspirin monotherapy.      -Continue aspirin 81 mg daily  Thrombocytopenia (CMS/HCC)  Platelet 94, chronic thrombocytopenia    - trend CBC   Chronic anemia  P/w hgb 8.6, from baseline between 8-9  History of prior GI bleeds, not on anticoagulation for atrial fibrillation due to this history  Per family would not want any aggressive interventions for GI bleeding   Currently no blood in bowel movements.    - Iron 73, Ferritin 93, TIBC low at 265,   - Transfuse <7    Hypothyroidism  Home Medication 100 mcg levothyroxine     - continue   CKD (chronic kidney disease) stage 4, GFR 15-29 ml/min (CMS/HCC)  Baseline Cr 1.3 - 1.5   Presenting with Cr of 1.5   Around baseline    - Avoid nephrotoxins, dose meds renally, renal diet; Heparin for DVT prophylaxis  Debility  Chronic debility     - Palliative care consult     Palliative care by specialist    Confusion      SUBJECTIVE   This is a 89 y.o. year-old male admitted on 2/16/2025 with Confusion [R41.0].    Interval History: NAEON. NAD. AAOx3. Denies any CP, SOB, cough. Denies abdominal pain. Transitioned from meropenum to zosyn.      OBJECTIVE   Vital signs in last 24 hours:  Temp:  [36.1 °C (97 °F)-36.8 °C (98.2 °F)] 36.7 °C (98 °F)  Heart Rate:  [64-89] 78  Resp:  [16-20] 18  BP: ()/(51-67) 89/59  SpO2:  [93 %-98 %] 97 %  Oxygen Therapy: None (Room air)      Weight & I/Os:  Weights (last 5 days)       Date/Time Weight    02/16/25 10:31:54 71.7  kg (158 lb)            Intake/Output Summary (Last 24 hours) at 2/18/2025 0659  Last data filed at 2/17/2025 2200  24 Hour Net Input/Output from 7AM Yesterday   Intake 620 ml   Output 500 ml   Net 120 ml            PHYSICAL EXAMINATION   Physical Exam  Constitutional:       Appearance: Normal appearance.   Cardiovascular:      Pulses: Normal pulses.      Heart sounds: Normal heart sounds.   Pulmonary:      Effort: Pulmonary effort is normal.      Breath sounds: Normal breath sounds.   Abdominal:      General: Bowel sounds are normal.   Neurological:      Mental Status: He is alert.          LINES, CATHETERS, DRAINS, AIRWAYS, & WOUNDS   Lines, drains, airways, & wounds:  Peripheral IV (Adult) 02/16/25 Left Forearm (Active)   Number of days: 2       Peripheral IV (Adult) 02/16/25 Right Forearm (Active)   Number of days: 2       Colostomy Other (comment) LUQ (Active)   Number of days: 930       Wound Pressure Injury Posterior Sacrum (Active)   Number of days: 2       Wound Pressure Injury Left;Lower Back (Active)   Number of days: 2        LABS & IMAGING   Labs:      Results from last 7 days   Lab Units 02/16/25  0148   WBC K/uL 4.69   HEMOGLOBIN g/dL 8.6*   HEMATOCRIT % 28.1*   PLATELETS K/uL 94*       Results from last 7 days   Lab Units 02/16/25  0148   SODIUM mEQ/L 141   POTASSIUM mEQ/L 4.3   CHLORIDE mEQ/L 111*   CO2 mEQ/L 25   BUN mg/dL 26*   CREATININE mg/dL 1.5*   CALCIUM mg/dL 7.5*   ALBUMIN g/dL 2.6*   BILIRUBIN TOTAL mg/dL 0.6   ALK PHOS IU/L 83   ALT IU/L 6*   AST IU/L 28   GLUCOSE mg/dL 100*     Lab Results   Component Value Date    MG 2.1 02/08/2025    PHOS 2.9 08/23/2022     Lab Results   Component Value Date    PT 20.2 (H) 02/04/2025    INR 1.7 02/04/2025    PTT 32 02/04/2025       Microbiology Results (last 3 days)       Procedure Component Value - Date/Time    Body Fluid Culture/Smear Pleural Fluid, Left [[id number]] Collected: 02/17/25 1010    Lab Status: Preliminary result Specimen: Pleural Fluid,  Left Updated: 02/17/25 2008     Gram Stain Result 1+ WBC      No organisms seen    MRSA Screen, Nares Only Nose [[id number]]  (Normal) Collected: 02/16/25 1333    Lab Status: Final result Specimen: Nasal Swab from Nose Updated: 02/16/25 1625     MRSA DNA, Nares Negative     Comment: No methicillin resistant Staphylococcus aureus (MRSA) detected.       Legionella antigen, urine Urine, Clean Catch [[id number]]  (Normal) Collected: 02/16/25 1121    Lab Status: Final result Specimen: Urine, Clean Catch Updated: 02/16/25 1227     Legionella pneumophila Serogp 1 Ur Ag Negative    Blood Culture Blood, Venous [[id number]]  (Normal) Collected: 02/16/25 1115    Lab Status: Preliminary result Specimen: Blood, Venous Updated: 02/17/25 1301     Culture No growth at 18-24 hours    Blood Culture Blood, Venous [[id number]]  (Normal) Collected: 02/16/25 1115    Lab Status: Preliminary result Specimen: Blood, Venous Updated: 02/17/25 1301     Culture No growth at 18-24 hours    SARS-COV-2 (COVID-19)/ FLU A/B, AND RSV, PCR Nasopharynx [[id number]]  (Normal) Collected: 02/16/25 0824    Lab Status: Final result Specimen: Nasopharyngeal Swab from Nasopharynx Updated: 02/16/25 0938     SARS-CoV-2 (COVID-19) Negative     Influenza A Negative     Influenza B Negative     Respiratory Syncytial Virus Negative    Narrative:      Testing performed using real-time PCR for detection of COVID-19. EUA approved validation studies performed on site.     Urine culture Urine, Clean Catch [[id number]]  (Normal) Collected: 02/16/25 0422    Lab Status: Final result Specimen: Urine, Clean Catch Updated: 02/17/25 0909     Urine Culture No Growth (Limit of detection 1000 cfu/mL)            Imaging personally reviewed (does not include unread studies):  IR THORACENTESIS    Result Date: 2/17/2025  CLINICAL HISTORY: Shortness of breath. COMMENT: The patient was referred for ultrasound-guided thoracentesis on the left side.  Informed consent was obtained.  With the  patient laying right lateral decubitus, ultrasound imaging was performed and a large size pleural effusion was identified.  The fluid was localized and a site was selected on the skin with ultrasound.  The site was marked.  Using sterile technique, under local anesthesia, a 4-Sammarinese valved Yueh catheter was inserted into the pleural space. 900 mL of clear yellow fluid were removed.  The catheter was removed and a sterile dressing was placed over the catheter insertion site. A large pleural effusion remained post procedure.  This procedure was discontinued prior to complete evacuation of the effusion because of patient discomfort.  The fluid was sent to the lab as requested.  The patient tolerated the procedure well. An erect frontal chest film was obtained immediately following the procedure and will be separately reported. This service rendered by Betsy Benitez PA-C     IMPRESSION: Successful ultrasound-guided left thoracentesis with 900 mL removed and sent to the lab.  All components of the time-out, debriefing, and handling of the specimen were conducted as per the ASAP Specimen Protocol. I certify that I have personally reviewed this examination and agree with Betsy Benitez's report. Goyo Burnham M.D.    X-RAY CHEST 1 VIEW    Result Date: 2/17/2025  CLINICAL HISTORY: Status post left thoracentesis COMMENT: Portable erect AP view of the chest is obtained.  No pneumothorax appreciated.  Moderate facet possibly loculated left pleural effusion.  Small right pleural effusion.  The heart is enlarged.     IMPRESSION: No pneumothorax appreciated post thoracentesis. Pleural effusions    MEDS/DRIPS   Scheduled Meds:   aspirin  81 mg oral Daily    calcium (as carbonate)  200 mg of elemental calcium oral Daily    famotidine  10 mg oral Daily    ferrous sulfate  162 mg oral Daily with breakfast    finasteride  5 mg oral Daily    latanoprost  1 drop Both Eyes Nightly    levothyroxine  100 mcg oral Daily before breakfast     melatonin  10 mg oral Nightly    metoprolol succinate XL  12.5 mg oral Daily with dinner    pantoprazole  20 mg oral Daily    piperacillin-tazobactam  4.5 g intravenous (extended interval) q6h INT    thiamine HCl  100 mg oral q AM    [Provider Managed Hold] torsemide  10 mg oral q AM     Continuous Infusions:  PRN Meds:.  glucose **OR** dextrose **OR** glucagon **OR** dextrose 50 % in water (D50)     ICU CHECKLIST   Lines:   Peripheral IV (Adult) 02/16/25 Left Forearm (Active)   Number of days: 2       Peripheral IV (Adult) 02/16/25 Right Forearm (Active)   Number of days: 2       Colostomy Other (comment) LUQ (Active)   Number of days: 930       Wound Pressure Injury Posterior Sacrum (Active)   Number of days: 2       Wound Pressure Injury Left;Lower Back (Active)   Number of days: 2        Code Status: DNR (A.N.D.)  Estimated discharge date: 2/20/2025       ATTENDING DOCUMENTATION  ALSO SEE ATTENDING ATTESTATION SECTION OF NOTE

## 2025-02-19 VITALS
DIASTOLIC BLOOD PRESSURE: 55 MMHG | BODY MASS INDEX: 21.4 KG/M2 | HEIGHT: 72 IN | SYSTOLIC BLOOD PRESSURE: 105 MMHG | OXYGEN SATURATION: 100 % | HEART RATE: 79 BPM | RESPIRATION RATE: 18 BRPM | WEIGHT: 158 LBS | TEMPERATURE: 97.6 F

## 2025-02-19 PROCEDURE — 25800000 HC PHARMACY IV SOLUTIONS

## 2025-02-19 PROCEDURE — 99999 PR OFFICE/OUTPT VISIT,PROCEDURE ONLY: CPT | Performed by: STUDENT IN AN ORGANIZED HEALTH CARE EDUCATION/TRAINING PROGRAM

## 2025-02-19 PROCEDURE — 99232 SBSQ HOSP IP/OBS MODERATE 35: CPT | Performed by: INTERNAL MEDICINE

## 2025-02-19 PROCEDURE — 99238 HOSP IP/OBS DSCHRG MGMT 30/<: CPT | Performed by: STUDENT IN AN ORGANIZED HEALTH CARE EDUCATION/TRAINING PROGRAM

## 2025-02-19 PROCEDURE — 63600000 HC DRUGS/DETAIL CODE: Mod: JZ

## 2025-02-19 PROCEDURE — 63700000 HC SELF-ADMINISTRABLE DRUG

## 2025-02-19 RX ORDER — AMOXICILLIN AND CLAVULANATE POTASSIUM 875; 125 MG/1; MG/1
1 TABLET, FILM COATED ORAL 2 TIMES DAILY
Qty: 2 TABLET | Refills: 0 | Status: SHIPPED | OUTPATIENT
Start: 2025-02-20 | End: 2025-02-21

## 2025-02-19 RX ADMIN — FERROUS SULFATE 162 MG: 300 SOLUTION ORAL at 11:41

## 2025-02-19 RX ADMIN — FINASTERIDE 5 MG: 5 TABLET, FILM COATED ORAL at 09:43

## 2025-02-19 RX ADMIN — PANTOPRAZOLE SODIUM 20 MG: 20 TABLET, DELAYED RELEASE ORAL at 09:42

## 2025-02-19 RX ADMIN — ASPIRIN 81 MG: 81 TABLET, COATED ORAL at 09:42

## 2025-02-19 RX ADMIN — FAMOTIDINE 10 MG: 20 TABLET, FILM COATED ORAL at 09:42

## 2025-02-19 RX ADMIN — THIAMINE HCL TAB 100 MG 100 MG: 100 TAB at 09:43

## 2025-02-19 RX ADMIN — PIPERACILLIN SODIUM AND TAZOBACTAM SODIUM 4.5 G: 4; .5 INJECTION, POWDER, LYOPHILIZED, FOR SOLUTION INTRAVENOUS at 02:06

## 2025-02-19 RX ADMIN — ANTACID TABLETS 200 MG OF ELEMENTAL CALCIUM: 500 TABLET, CHEWABLE ORAL at 09:42

## 2025-02-19 RX ADMIN — PIPERACILLIN SODIUM AND TAZOBACTAM SODIUM 4.5 G: 4; .5 INJECTION, POWDER, LYOPHILIZED, FOR SOLUTION INTRAVENOUS at 09:43

## 2025-02-19 RX ADMIN — LEVOTHYROXINE SODIUM 100 MCG: 0.1 TABLET ORAL at 06:32

## 2025-02-19 RX ADMIN — PIPERACILLIN SODIUM AND TAZOBACTAM SODIUM 4.5 G: 4; .5 INJECTION, POWDER, LYOPHILIZED, FOR SOLUTION INTRAVENOUS at 15:19

## 2025-02-19 NOTE — ASSESSMENT & PLAN NOTE
P/w confusion cough and present hospital admission   CT chest with   CT chest shows Extrinsic narrowing of the right left proximal bronchi. There are bilateral large pleural effusions left is larger than right with near complete opacification left hemithorax. There is associated volume loss and or consolidation in the left lung and the right lower middle lobe..  WBC 4.69  RSV/flu swab (winter) - negative   Recent hospital admission, need to cover for HAP   MRSA nares -, Legionella ag -, bcx NGTD    - Meropenum dc'd  -Vancomycin d/c given, - MRSA nares  - F/u scx, bcx, NGTD    - ID following, rec'd stopping meropenum and restarting Zosyn given likely absence of UTI. Will transition to Augmentin upon d/c

## 2025-02-19 NOTE — PLAN OF CARE
Care Coordination Admission Assessment Note    General Information:  Readmission Within the last 30 days: previous discharge plan unsuccessful  Does patient have a : No  Patient-Specific Goals (include timeframe): to go home    Living Arrangements:  Arrived From: home  Current Living Arrangements: home  People in Home: other (see comments) (24 hr caregiver)  Home Accessibility:    Living Arrangement Comments:      Housing Stability and Utility Access (SDOH):  In the last 12 months, was there a time when you were not able to pay the mortgage or rent on time?: No  In the past 12 months, how many times have you moved?: 0  At any time in the past 12 months, were you homeless or living in a shelter (including now)?: No  In the past 12 months has the electric, gas, oil, or water company threatened to shut off services in your home?: No    Functional Status Prior to Admission:   Assistive Device/Animal Currently Used at Home:    Functional Status Comments:    IADL Comments:       Supports and Services:  Current Outpatient/Agency/Support Group:    Type of Current Home Care Services:    History of home care episode or rehab stay: [facility]    Discharge Needs Assessment:   Concerns to be Addressed:    Current Discharge Risk: chronically ill  Anticipated Changes Related to Illness: inability to care for self    Patient/Family Anticipated Discharge Plan:  Patient/Family Anticipates Transition To:    Patient/Family Anticipated Services at Transition:      Connection to Community       Patient Choice:   Offered/Gave Vendor List:         Anticipated Discharge Plan:  Met with patient. Provided education and contact information for Care Coordination services.: yes  Anticipated Discharge Disposition: home with home health, home with assistance     Transportation Needs (SDOH):  Transportation Concerns: none  Transportation Anticipated: health plan transportation  Is Out of Hospital DNR needed at discharge?: no    In the  past 12 months, has lack of transportation kept you from medical appointments or from getting medications?: No  In the past 12 months, has lack of transportation kept you from meetings, work, or from getting things needed for daily living?: No    Concerns - comments: admission assessment completed with son [place]- pt name, , address, pcp, insurance, contact and pharm verified.Pt lives with a 24hr caregiver, Melinda, in a 2Sh w/ ramp entrance and 1st Fl set up(has hosp bed and WC). He is dependent in ADL's. Non ambulatory. WC bound at baseline. [facility] - [place] selected to resume services at discharge - ref sent via [facility]. [place] stated family will resume service with A at discharge , stated Melinda is already at home . [place] stated pt will need ambulance transport at discharge - [place] is aware that there might be a copay - agreeable.    Adden - as per dr Cruz via [facility] chat - pt is medically cleared for discharge to home today with [facility]. Dispo plan discussed with [place] - aware and agreeable with dispo plan.  [facility] updated via epic. Bls requested for 230 pm  - [place]/Rossy MARCOS and dr Cruz are aware. Cm will remain avail for dispo planning and emotional support.    As per dispatch Bls is now scheduled for 1730  trip#[phone]  - pts son [place] and treatment team are all aware. [place] confirmed that [facility] Melinda is already at home waiting for pt. [place] with no questions or concerns. Dr Cruz is aware that pt needs OOH DNR form for transport. Rossy MARCOS is aware to contact [facility] at [phone] trip# [phone] for any changes  in transport.

## 2025-02-19 NOTE — PROGRESS NOTES
Internal Medicine  ICU/CCU Daily Progress Note        Assessment & Plan  Pneumonia  P/w confusion cough and present hospital admission   CT chest with   CT chest shows Extrinsic narrowing of the right left proximal bronchi. There are bilateral large pleural effusions left is larger than right with near complete opacification left hemithorax. There is associated volume loss and or consolidation in the left lung and the right lower middle lobe..  WBC 4.69  RSV/flu swab (winter) - negative   Recent hospital admission, need to cover for HAP   MRSA nares -, Legionella ag -, bcx NGTD    - Meropenum dc'd  -Vancomycin d/c given, - MRSA nares  - F/u scx, bcx, NGTD    - ID following, rec'd stopping meropenum and restarting Zosyn given likely absence of UTI. Will transition to Augmentin upon d/c    Recurrent pleural effusion  Chronic heart failure (EF 40-45% in 09/2024) abnormal septal motion due to RV volume overload, RV massively dilated with severely decreased systolic function, torrential tricuspid regurgitation.   Complicated by recurrent left sided pleural effusions requiring thoracentesis.  Considerations for Pleurx and pleurodesis in past, family prefers conservative measures.   Large bilateral pleural effusions noted on CT imaging, L > R.   No increase in oxygen requirements at this time.   Home med: torsemide 10mg daily.     - Continue metoprolol 25 mg twice daily.   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   Complicated by recurrent left sided pleural effusions requiring thoracentesis.    Home Meds Torsemide and     - hold torsemide   - continue on metoprolol   A-fib (CMS/HCC)  Hx  of prior atrial fibrillation  Home regimen: metoprolol succinate XL 25mg for rate control, not on AC due to history of GI bleed.  Follows with Dr. Roshan Jeffrey at [facility]  [id number] score: 5 points (age +2, CHF history +1, HTN history +1, prior NSTEMI +1)    - Continue metoprolol 25mg daily for rate control  - monitor on tele    CAD (coronary artery disease)  History of CABG in 1996.  Previously on plavix and aspirin; Plavix had been discontinued over the last several months due to recurrent GI bleeds. Continue on aspirin monotherapy.      -Continue aspirin 81 mg daily  Thrombocytopenia (CMS/HCC)  Platelet 94, chronic thrombocytopenia    - trend CBC   Chronic anemia  P/w hgb 8.6, from baseline between 8-9  History of prior GI bleeds, not on anticoagulation for atrial fibrillation due to this history  Per family would not want any aggressive interventions for GI bleeding   Currently no blood in bowel movements.    - Iron 73, Ferritin 93, TIBC low at 265,   - Transfuse <7    Hypothyroidism  Home Medication 100 mcg levothyroxine     - continue   CKD (chronic kidney disease) stage 4, GFR 15-29 ml/min (CMS/HCC)  Baseline Cr 1.3 - 1.5   Presenting with Cr of 1.5   Around baseline    - Avoid nephrotoxins, dose meds renally, renal diet; Heparin for DVT prophylaxis  Debility  Chronic debility     - Palliative care consult     Palliative care by specialist    Confusion      SUBJECTIVE   This is a 89 y.o. year-old male admitted on 2/16/2025 with Confusion [R41.0].    Interval History: NAD. FLORENCE. AAOx3.Denies any CP, SOB, ABD pain. States he is feeling much better and inquires about evening discharge     OBJECTIVE   Vital signs in last 24 hours:  Temp:  [36.4 °C (97.5 °F)-36.8 °C (98.2 °F)] 36.7 °C (98.1 °F)  Heart Rate:  [57-77] 72  Resp:  [18] 18  BP: ()/(46-65) 105/55  SpO2:  [95 %-97 %] 96 %  Oxygen Therapy: None (Room air)      Weight & I/Os:  Weights (last 5 days)       Date/Time Weight    02/16/25  10:31:54 71.7 kg (158 lb)            Intake/Output Summary (Last 24 hours) at 2/19/2025 0659  Last data filed at 2/19/2025 0545  24 Hour Net Input/Output from 7AM Yesterday   Intake 10 ml   Output 600 ml   Net -590 ml          PHYSICAL EXAMINATION   Physical Exam  Constitutional:       Appearance: Normal appearance.   Eyes:      Extraocular Movements: Extraocular movements intact.   Cardiovascular:      Rate and Rhythm: Normal rate and regular rhythm.      Pulses: Normal pulses.      Heart sounds: Normal heart sounds.   Pulmonary:      Effort: Pulmonary effort is normal.   Neurological:      Mental Status: He is alert and oriented to person, place, and time. Mental status is at baseline.          LINES, CATHETERS, DRAINS, AIRWAYS, & WOUNDS   Lines, drains, airways, & wounds:  Peripheral IV (Adult) 02/16/25 Left Forearm (Active)   Number of days: 3       Peripheral IV (Adult) 02/16/25 Right Forearm (Active)   Number of days: 3       Colostomy Other (comment) LUQ (Active)   Number of days: 931       Wound Pressure Injury Posterior Sacrum (Active)   Number of days: 3       Wound Pressure Injury Left;Lower Back (Active)   Number of days: 3        LABS & IMAGING   Labs:      Results from last 7 days   Lab Units 02/16/25  0148   WBC K/uL 4.69   HEMOGLOBIN g/dL 8.6*   HEMATOCRIT % 28.1*   PLATELETS K/uL 94*       Results from last 7 days   Lab Units 02/16/25  0148   SODIUM mEQ/L 141   POTASSIUM mEQ/L 4.3   CHLORIDE mEQ/L 111*   CO2 mEQ/L 25   BUN mg/dL 26*   CREATININE mg/dL 1.5*   CALCIUM mg/dL 7.5*   ALBUMIN g/dL 2.6*   BILIRUBIN TOTAL mg/dL 0.6   ALK PHOS IU/L 83   ALT IU/L 6*   AST IU/L 28   GLUCOSE mg/dL 100*     Lab Results   Component Value Date    MG 2.1 02/08/2025    PHOS 2.9 08/23/2022     Lab Results   Component Value Date    PT 20.2 (H) 02/04/2025    INR 1.7 02/04/2025    PTT 32 02/04/2025       Microbiology Results (last 3 days)       Procedure Component Value - Date/Time    Body Fluid Culture/Smear Pleural  Fluid, Left [[id number]] Collected: 02/17/25 1010    Lab Status: Preliminary result Specimen: Pleural Fluid, Left Updated: 02/18/25 1026     Culture No growth at 18-24 hours     Gram Stain Result 1+ WBC      No organisms seen    Fungal Stain Pleural Fluid, Left [[id number]] Collected: 02/17/25 1010    Lab Status: Final result Specimen: Pleural Fluid, Left Updated: 02/18/25 1437     Fungus Stain No fungal elements seen    Fungal Culture Pleural Fluid, Left [[id number]]  (Normal) Collected: 02/17/25 1010    Lab Status: Preliminary result Specimen: Pleural Fluid, Left Updated: 02/18/25 1501     Fungal Culture No Fungus Isolated to Date    AFB stain Pleural Fluid, Left [[id number]]  (Normal) Collected: 02/17/25 1010    Lab Status: Final result Specimen: Pleural Fluid, Left Updated: 02/18/25 1440     AFB Stain No acid fast bacilli seen    MRSA Screen, Nares Only Nose [[id number]]  (Normal) Collected: 02/16/25 1333    Lab Status: Final result Specimen: Nasal Swab from Nose Updated: 02/16/25 1625     MRSA DNA, Nares Negative     Comment: No methicillin resistant Staphylococcus aureus (MRSA) detected.       Legionella antigen, urine Urine, Clean Catch [[id number]]  (Normal) Collected: 02/16/25 1121    Lab Status: Final result Specimen: Urine, Clean Catch Updated: 02/16/25 1227     Legionella pneumophila Serogp 1 Ur Ag Negative    Blood Culture Blood, Venous [[id number]]  (Normal) Collected: 02/16/25 1115    Lab Status: Preliminary result Specimen: Blood, Venous Updated: 02/18/25 1301     Culture No growth at 48 hours    Blood Culture Blood, Venous [[id number]]  (Normal) Collected: 02/16/25 1115    Lab Status: Preliminary result Specimen: Blood, Venous Updated: 02/18/25 1301     Culture No growth at 48 hours            Imaging personally reviewed (does not include unread studies):  No results found.   MEDS/DRIPS   Scheduled Meds:   aspirin  81 mg oral Daily    calcium (as carbonate)  200 mg of elemental calcium oral Daily     famotidine  10 mg oral Daily    ferrous sulfate  162 mg oral Daily with breakfast    finasteride  5 mg oral Daily    latanoprost  1 drop Both Eyes Nightly    levothyroxine  100 mcg oral Daily before breakfast    melatonin  10 mg oral Nightly    metoprolol succinate XL  12.5 mg oral Daily with dinner    pantoprazole  20 mg oral Daily    piperacillin-tazobactam  4.5 g intravenous (extended interval) q6h INT    thiamine HCl  100 mg oral q AM    [Provider Managed Hold] torsemide  10 mg oral q AM     Continuous Infusions:  PRN Meds:.  glucose **OR** dextrose **OR** glucagon **OR** dextrose 50 % in water (D50)     ICU CHECKLIST   Lines:   Peripheral IV (Adult) 02/16/25 Left Forearm (Active)   Number of days: 3       Peripheral IV (Adult) 02/16/25 Right Forearm (Active)   Number of days: 3       Colostomy Other (comment) LUQ (Active)   Number of days: 931       Wound Pressure Injury Posterior Sacrum (Active)   Number of days: 3       Wound Pressure Injury Left;Lower Back (Active)   Number of days: 3        Code Status: DNR (A.N.D.)  Estimated discharge date: 2/20/2025       ATTENDING DOCUMENTATION  ALSO SEE ATTENDING ATTESTATION SECTION OF NOTE

## 2025-02-19 NOTE — NURSING NOTE
IV and cardiac monitor removed. AVS reviewed with patient. Patient taken in stable condition via [facility] with all belongings.

## 2025-02-19 NOTE — PROGRESS NOTES
02/19/25 1315   Hospital to Home   Patient Enrolled in H2H? Yes   H2H Coordinator Comment Spoke w/ patient's son (Marko) who is agreeable to H2H program. Episode created. Call son (Marko): [phone].

## 2025-02-19 NOTE — ASSESSMENT & PLAN NOTE
Hx of prior atrial fibrillation  Home regimen: metoprolol succinate XL 25mg for rate control, not on AC due to history of GI bleed.  Follows with Dr. Roshan Jeffrey at [facility]  [id number] score: 5 points (age +2, CHF history +1, HTN history +1, prior NSTEMI +1)    - Continue metoprolol 25mg daily for rate control  - monitor on tele

## 2025-02-19 NOTE — PROGRESS NOTES
[facility] HC Patient know patient of agency . Will follow for [facility] [place] and NP as per rec. Lucas Foss RN b [id number]

## 2025-02-19 NOTE — PROGRESS NOTES
Palliative Care Progress Note    This is Hospital Day: 4    Conversation/Goals of Care:  Life prolonging with limits (DNR/DNI)    Assessment & Plan  Debility  - Debility likely multifactorial in the setting of frailty, recurrent hospitalizations, HFrEF, recurrent GIB.    - Living situation prior to hospitalization home with 24 hour care .   - Baseline functional status is requires assistance with ADLs.   - Current Palliative Performance Status: 50%  - Baseline Palliative Performance Status: Unknown   - Frailty yes   - Patient remains high risk for further deterioration and functional decline.    Plan:  - continue current care  - Palliative care will continue to follow for complex medical decision making  - Patient will go home with palliative NP visits and palliative nursing visits  Palliative care by specialist  89 y.o. with a PMH of HFrEF, torrential TR, recurrent GIB, afib not on AC presenting with altered mental status, found to have PNA    - Code status: Do Not Resuscitate / Allow Natural Death  - Prognosis: TBD   - Advance Directives: OOH DNR on file   - Surrogate Decision Maker: previously named all 4 of his adult sons as surrogate; no hPOA so they are all healthcare representatives   - Capacity intact   - Spiritual & Existential Needs: none identified       Interval History (Subjective)    Source: chart review and the patient    No acute events, he is feeling well and hoping to go home soon.    Current Medications:  Current Facility-Administered Medications   Medication Dose Route Frequency Provider Last Rate Last Admin    aspirin enteric coated tablet 81 mg  81 mg oral Daily Imelda Altamirano MD   81 mg at 02/19/25 0942    calcium (as carbonate) (TUMS) chewable tablet 200 mg of elemental calcium  200 mg of elemental calcium oral Daily Imelda Altamirano MD   200 mg of elemental calcium at 02/19/25 0942    glucose chewable tablet 16-32 g of dextrose  16-32 g of dextrose oral PRN Adarsh  Imelda Castro MD        Or    dextrose 40 % oral gel 15-30 g of dextrose  15-30 g of dextrose oral PRN Imelda Altamirano MD        Or    glucagon (GLUCAGEN) injection 1 mg  1 mg intramuscular PRN Imelda Altamirano MD        Or    dextrose 50 % in water (D50) injection 12.5 g  25 mL intravenous PRN Imelda Altamirano MD        famotidine (PEPCID) tablet 10 mg  10 mg oral Daily Imelda Altamirano MD   10 mg at 02/19/25 0942    ferrous sulfate 300 mg (60 mg iron)/5 mL liquid 162 mg  162 mg oral Daily with breakfast Imelda Altamirano MD   162 mg at 02/19/25 1141    finasteride (PROSCAR) tablet 5 mg  5 mg oral Daily Imelda Altamirano MD   5 mg at 02/19/25 0943    latanoprost (XALATAN) 0.005 % ophthalmic solution 1 drop  1 drop Both Eyes Nightly Imelda Altamirano MD   1 drop at 02/18/25 2243    levothyroxine (SYNTHROID) tablet 100 mcg  100 mcg oral Daily before breakfast Imelda Altamirano MD   100 mcg at 02/19/25 0632    melatonin capsule 10 mg  10 mg oral Nightly Imelda Altamirano MD   10 mg at 02/18/25 2110    metoprolol succinate ER (TOPROL-XL) pre-halved 24 hr ER tablet 12.5 mg  12.5 mg oral Daily with dinner Imelda Altamirano MD   12.5 mg at 02/17/25 1754    pantoprazole (PROTONIX) tablet,delayed release (DR/EC) 20 mg  20 mg oral Daily Imelda Altamirano MD   20 mg at 02/19/25 0942    piperacillin-tazobactam (ZOSYN) 4.5 g/100 mL IVPB in NSS  4.5 g intravenous (extended interval) q6h INT Eric Anton,  33.3 mL/hr at 02/19/25 0943 4.5 g at 02/19/25 0943    thiamine (VITAMIN B1) tablet 100 mg  100 mg oral q AM Imelda Altamirano MD   100 mg at 02/19/25 0943    [Provider Managed Hold] torsemide (DEMADEX) tablet 10 mg  10 mg oral q AM Imelda Altamirano MD         Current Outpatient Medications   Medication Sig Dispense Refill    [START ON 2/20/2025] amoxicillin-pot clavulanate (AUGMENTIN) 875-125 mg per tablet Take 1 tablet by  mouth 2 (two) times a day for 2 doses. 2 tablet 0    aspirin 81 mg enteric coated tablet Take 81 mg by mouth daily. 9 am      bimatoprost (LUMIGAN) 0.01 % ophthalmic drops Administer 1 drop into the left eye nightly.      calcium, as carbonate, (TUMS) 200 mg calcium (500 mg) chewable tablet Take 1 tablet by mouth daily. 11 am      carboxymethylcellulose (REFRESH PLUS) 0.5 % dropperette Administer 1 drop into both eyes every2 (two) hours while awake.      cranberry extract 425 mg capsule Take 425 mg by mouth daily. 5 pm      cyanocobalamin (VITAMIN B12) 500 mcg tablet Take 500 mcg by mouth every morning. 9 am      famotidine (PEPCID) 10 mg tablet Take 10 mg by mouth daily. 11 am      ferrous sulfate 325 mg (65 mg iron) tablet Take 65 mg by mouth daily with breakfast. 11 am      finasteride (PROSCAR) 5 mg tablet Take 5 mg by mouth daily. 9 am      levothyroxine (SYNTHROID) 100 mcg tablet Take 100 mcg by mouth daily before breakfast. 7 am      melatonin 10 mg capsule Take 10 mg by mouth nightly. 9 pm      methenamine (HIPREX) 1 gram tablet Take 1 g by mouth daily before lunch. 11 am      metoprolol succinate XL (TOPROL-XL) 25 mg 24 hr tablet Take 12.5 mg by mouth daily with dinner. 5 pm      omeprazole (PriLOSEC) 20 mg capsule Take 20 mg by mouth daily. 11 am      potassium chloride (KLOR-CON M) 20 mEq CR tablet Take 10 mEq by mouth 2 (two) times a day. 9 am and 5 pm      tamsulosin (FLOMAX) 0.4 mg capsule Take 0.4 mg by mouth every morning. 9 am      thiamine HCl (VITAMIN B1) 100 mg tablet Take 100 mg by mouth every morning. 9 am      torsemide (DEMADEX) 10 mg tablet Take 10 mg by mouth every morning. 9 am         Objective    Physical Exam:  Physical Exam  Vitals reviewed.   Constitutional:       General: He is not in acute distress.  HENT:      Head: Normocephalic.      Mouth/Throat:      Mouth: Mucous membranes are moist.   Cardiovascular:      Rate and Rhythm: Normal rate.   Pulmonary:      Effort: No respiratory  distress.   Abdominal:      General: There is no distension.   Musculoskeletal:         General: No deformity.   Skin:     General: Skin is dry.   Neurological:      General: No focal deficit present.      Mental Status: He is alert.   Psychiatric:         Behavior: Behavior normal.         Vitals:  Vitals:    02/19/25 1100   BP: 121/70   Pulse: 79   Resp: 18   Temp: 36.4 °C (97.5 °F)   SpO2: 97%       Laboratory Studies:    No new labs.    Imaging and Other Studies:     I have independently reviewed the pertinent imaging to the time of note and agree with reported results.    I have independently reviewed the pertinent cardiac studies to the time of note and agree with reported results.    Shital Mi MD  Office [phone]

## 2025-02-19 NOTE — ASSESSMENT & PLAN NOTE
- Debility likely multifactorial in the setting of frailty, recurrent hospitalizations, HFrEF, recurrent GIB.    - Living situation prior to hospitalization home with 24 hour care .   - Baseline functional status is requires assistance with ADLs.   - Current Palliative Performance Status: 50%  - Baseline Palliative Performance Status: Unknown   - Frailty yes   - Patient remains high risk for further deterioration and functional decline.    Plan:  - continue current care  - Palliative care will continue to follow for complex medical decision making  - Patient will go home with palliative NP visits and palliative nursing visits

## 2025-02-19 NOTE — PROGRESS NOTES
[facility] REFERRAL RECEIVED: RN PT   Case reviewed. Spoke with client's son and agrees to start services with [facility] at d/c. Will continue to follow until d/c.    YAHIR: 2/19/25  Sandra Esparza RN   #[phone]

## 2025-02-19 NOTE — ASSESSMENT & PLAN NOTE
89 y.o. with a PMH of HFrEF, torrential TR, recurrent GIB, afib not on AC presenting with altered mental status, found to have PNA    - Code status: Do Not Resuscitate / Allow Natural Death  - Prognosis: TBD   - Advance Directives: OOH DNR on file   - Surrogate Decision Maker: previously named all 4 of his adult sons as surrogate; no hPOA so they are all healthcare representatives   - Capacity intact   - Spiritual & Existential Needs: none identified

## 2025-02-20 ENCOUNTER — PATIENT OUTREACH (OUTPATIENT)
Dept: CASE MANAGEMENT | Facility: CLINIC | Age: 89
End: 2025-02-20
Payer: MEDICARE

## 2025-02-20 LAB
BACTERIA BLD CULT: NORMAL
BACTERIA BLD CULT: NORMAL

## 2025-02-20 ASSESSMENT — ENCOUNTER SYMPTOMS
VOMITING: 0
ACTIVITY CHANGE: 1
NAUSEA: 0
SHORTNESS OF BREATH: 0
DIFFICULTY URINATING: 0
DIZZINESS: 0
LIGHT-HEADEDNESS: 0

## 2025-02-20 NOTE — DISCHARGE SUMMARY
Internal Medicine  Inpatient Discharge Summary        BRIEF OVERVIEW   Admitting Provider: Billie Shelton MD  Attending Provider: Melquiades Villarreal MD Attending phys phone: [phone]    PCP: Zachery Hernandez [phone]    Admission Date: 2/16/2025  Discharge Date: 2/18/2025     DISCHARGE DIAGNOSES      Primary Discharge Diagnosis  No Principal Problem: There is no principal problem currently on the Problem List. Please update the Problem List and refresh.    Secondary Discharge Diagnoses  Active Hospital Problems    Diagnosis Date Noted    Chronic anemia 02/16/2025    Confusion 02/16/2025    CAD (coronary artery disease) 02/04/2025    Thrombocytopenia (CMS/HCC) 02/04/2025    Recurrent pleural effusion 10/15/2024    A-fib (CMS/[facility]) 07/28/2024    History of chronic CHF 07/02/2024    Pneumonia 03/26/2024    CKD (chronic kidney disease) stage 4, GFR 15-29 ml/min (CMS/HCC) 12/03/2023    Hypothyroidism 11/29/2023    Debility 08/10/2022    Palliative care by specialist 08/10/2022      Resolved Hospital Problems   No resolved problems to display.       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)    Hypertension    Atrial fibrillation (CMS/HCC)    Elevated troponin    Orthostatic hypotension    Hyperlipidemia    Hypothyroidism    Depression    CKD (chronic kidney disease) stage 4, GFR 15-29 ml/min (CMS/HCC)    Pressure ulcer    Colostomy present on admission (CMS/[facility])    Pneumonia    Multiple medical problems    History of recurrent UTIs    BMI 21.0-21.9, adult    GERD (gastroesophageal reflux disease)    Preop examination    At risk for obstructive sleep apnea    Anemia due to chronic kidney  disease    History of chronic CHF    Thrombocytopenia (CMS/HCC)    Glaucoma    Age-related cataract of right eye    Encephalopathy    HFrEF (heart failure with reduced ejection fraction) (CMS/HCC)    History of recurrent UTIs    A-fib (CMS/HCC)    Hypertension    Acute kidney injury superimposed on CKD (CMS/HCC)  (CMS/HCC)    Acute decompensated heart failure (CMS/HCC)    Dysphagia    GI bleeding    Compression fracture of lumbar vertebra (CMS/HCC)    Recurrent pleural effusion    Gastrointestinal hemorrhage, unspecified gastrointestinal hemorrhage type    Acute on chronic anemia    Rectal bleeding    HFrEF (heart failure with reduced ejection fraction) (CMS/HCC)    Atrial fibrillation (CMS/HCC)    CAD (coronary artery disease)    History of anxiety    Acute on chronic anemia    Hx of chronic kidney disease    Thrombocytopenia (CMS/HCC)    Chronic HFrEF (heart failure with reduced ejection fraction) (CMS/HCC)    CKD stage 3a, GFR 45-59 ml/min (CMS/[facility])    Confusion    Chronic anemia     SUMMARY OF HOSPITALIZATION      Presenting Problem/History of Present Illness  This is a 89 y.o. 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 He has had several hospital admissions with recurrent GI bleeding,  likely diverticular in etiology versus stomal irritation which have been largely managed with transfusions. During his latest admission, he had a CTA which showed active rectosigmoid stump bleed, IR intervention was offered but due to high risk, family elected to not proceed with intervention. Multiple GOC discussion were had during this admission and family elected to not go  further with procedures and monitor and transfuse as appropriate.      Per patient's son,  late evening prior to admission, he started acting more erratic and confused and similar to how he is when he has a UTI. Due to persistence on symptoms they were more concerned and called EMS. Per EMS, on arrival patient's BP was soft in the 80s systolic. He denies any fevers, worsening SOB, altered appetite. He has had a cough for the past few weeks and has been producing white sputum. In terms of his recurrent, pleural effusions he does get thora's as an outpatient, he last got one when he was admitted to the hospital. Prior to this, he was getting it every 4-6 weeks. . At my time of assessment, patient was alert and oriented to place/person. He states that he is the hospital for a pneumonia and denies any SOB/pain.      Patient and family would not want aggressive surgeries and procedures but would be okay with thoracentesis for symptomatic help for his breathing. At baseline, he lives at home alone but has continuous aids. He needs assistance to ambulate. His sons visit him frequently and live close by to him.     Hospital Course  Pneumonia  Recurrent pleural effusion  P/w confusion, cough and present prior to hospital admission.  Patient presented without respiratory distress or hypoxia.  CT chest showed extrinsic narrowing of the right left proximal bronchi. b/l large pleural effusions L>G with near complete opacification left hemithorax. Associated volume loss and or consolidation in the left lung and the right lower middle lobe. Effusions deemed to be most likely in the setting of heart failure given hx of recurrent effusions.  Patient was started on vancomycin,Zosyn for coverage of PNA and potential UTI.  Zosyn switched to meropenem due to c/f ESBL UTI.  Vancomycin was d/c'd and meropenem was switched to Zosyn once UTI was ruled out. Infectious workup including blood cultures, Legionella, MRSA nares, RSV/flu swab,  were negative.  Additionally patient underwent ultrasound-guided left thoracentesis after which 900 mL were removed.  Patient discharged on oral antibiotic regimen of Augmentin 875/125 twice a day for 5 days total antibiotic course.    History of chronic CHF  Patient with history of chronic heart failure (EF 40-45% in 09/2024). Complicated by recurrent left sided pleural effusions requiring thoracentesis.  Patient was not deemed to be an active exacerbation of hospitalization.  Home torsemide was held and patient was continued on metoprolol 25 mg twice daily.  Given patient's hypotension, additional GDMT was not started.     A-fib (CMS/[facility])  Hx of prior atrial fibrillation with home regimen metoprolol succinate XL 25mg for rate control, not on AC due to history of GI bleed. Follows with Dr. Roshan Jeffrey at [facility].  Patient was continued metoprolol 25mg daily for rate control.     CAD (coronary artery disease)  History of CABG in 1996. Plavix had been discontinued over the last several months due to recurrent GI bleeds. Continued on aspirin 81 mg monotherapy.     Thrombocytopenia (CMS/[facility])  Platelet 94, chronic thrombocytopenia.  Stable this admission.     Chronic anemia  P/w hgb 8.6, from baseline between 8-9. History of prior GI bleeds, not on anticoagulation for atrial fibrillation due to this history. Per family would not want any aggressive interventions for GI bleeding.  No hematochezia or melena was appreciated during this admission and anemia was stable.     Hypothyroidism  Patient continued on 100 mcg levothyroxine.     CKD (chronic kidney disease) stage 4, GFR 15-29 ml/min (CMS/[facility])  Baseline Cr 1.3 - 1.5.  Patient's creatinine was at baseline during this admission      Exam on Day of Discharge  Physical Exam  Cardiovascular:      Rate and Rhythm: Normal rate and regular rhythm.      Heart sounds: Normal heart sounds.   Pulmonary:      Comments: Decreased breath sounds at bilateral lung  bases  Abdominal:      General: Abdomen is flat.      Palpations: Abdomen is soft.   Musculoskeletal:      Right lower leg: No edema.      Left lower leg: No edema.   Neurological:      Mental Status: He is alert. Mental status is at baseline.       Consults During Admission  IP CONSULT TO INFECTIOUS DISEASE  IP CONSULT TO PAIN/PALLIATIVE CARE    DISCHARGE MEDICATIONS        New, changed, or stopped medications from this admission:       Medication List        ASK your doctor about these medications      aspirin 81 mg enteric coated tablet  Take 81 mg by mouth daily. 9 am  Dose: 81 mg     calcium (as carbonate) 200 mg calcium (500 mg) chewable tablet  Commonly known as: TUMS  Take 1 tablet by mouth daily. 11 am  Dose: 1 tablet     carboxymethylcellulose 0.5 % dropperette  Commonly known as: REFRESH PLUS  Administer 1 drop into both eyes every2 (two) hours while awake.  Dose: 1 drop     cranberry extract 425 mg capsule  Take 425 mg by mouth daily. 5 pm  Dose: 425 mg     cyanocobalamin 500 mcg tablet  Commonly known as: VITAMIN B12  Take 500 mcg by mouth every morning. 9 am  Dose: 500 mcg     famotidine 10 mg tablet  Commonly known as: PEPCID  Take 10 mg by mouth daily. 11 am  Dose: 10 mg     ferrous sulfate 325 mg (65 mg iron) tablet  Take 65 mg by mouth daily with breakfast. 11 am  Dose: 65 mg     finasteride 5 mg tablet  Commonly known as: PROSCAR  Take 5 mg by mouth daily. 9 am  Dose: 5 mg     levothyroxine 100 mcg tablet  Commonly known as: SYNTHROID  Take 100 mcg by mouth daily before breakfast. 7 am  Dose: 100 mcg     LUMIGAN 0.01 % ophthalmic drops  Administer 1 drop into the left eye nightly.  Dose: 1 drop  Generic drug: bimatoprost     melatonin 10 mg capsule  Take 10 mg by mouth nightly. 9 pm  Dose: 10 mg     methenamine 1 gram tablet  Commonly known as: HIPREX  Take 1 g by mouth daily before lunch. 11 am  Dose: 1 g     metoprolol succinate XL 25 mg 24 hr tablet  Commonly known as: TOPROL-XL  Take 12.5 mg  "by mouth daily with dinner. 5 pm  Dose: 12.5 mg     omeprazole 20 mg capsule  Commonly known as: PriLOSEC  Take 20 mg by mouth daily. 11 am  Dose: 20 mg     potassium chloride 20 mEq CR tablet  Commonly known as: KLOR-CON M  Take 10 mEq by mouth 2 (two) times a day. 9 am and 5 pm  Dose: 10 mEq     tamsulosin 0.4 mg capsule  Commonly known as: FLOMAX  Take 0.4 mg by mouth every morning. 9 am  Dose: 0.4 mg     thiamine HCl 100 mg tablet  Commonly known as: VITAMIN B1  Take 100 mg by mouth every morning. 9 am  Dose: 100 mg     torsemide 10 mg tablet  Commonly known as: DEMADEX  Take 10 mg by mouth every morning. 9 am  Dose: 10 mg              Non-Medication orders at discharge:   Instructions for after discharge       Call provider for:  difficulty breathing      Call provider for:  extreme fatigue      Call provider for:  persistent dizziness or light-headedness, headache, or visual disturbances      Call provider for:  temperature >100.4      Call provider for: increased shortness of breath; weight gain (3 pounds/1 day or 5 pounds/1 week); more swelling in legs, ankles, feet or belly; need to sleep with extra pillows or in a chair.      Discharge Diet      Diet Type / Texture:  Regular  Renal (2gm Sodium/2gm Potassium)       Review additional discharge directions in \"Instructions\" section on the last page                        PROCEDURES / LABS / IMAGING      Operative Procedures  US guided left thora     Other Procedures  N/a    Pertinent Labs  Results from last 7 days   Lab Units 02/16/25  0148   WBC K/uL 4.69   HEMOGLOBIN g/dL 8.6*   HEMATOCRIT % 28.1*   PLATELETS K/uL 94*     Results from last 7 days   Lab Units 02/16/25  0148   SODIUM mEQ/L 141   POTASSIUM mEQ/L 4.3   CHLORIDE mEQ/L 111*   CO2 mEQ/L 25   BUN mg/dL 26*   CREATININE mg/dL 1.5*   CALCIUM mg/dL 7.5*   ALBUMIN g/dL 2.6*   BILIRUBIN TOTAL mg/dL 0.6   ALK PHOS IU/L 83   ALT IU/L 6*   AST IU/L 28   GLUCOSE mg/dL 100*         Pertinent Imaging  IR " THORACENTESIS    Result Date: 2/17/2025  IMPRESSION: Successful ultrasound-guided left thoracentesis with 900 mL removed and sent to the lab.  All components of the time-out, debriefing, and handling of the specimen were conducted as per the ASAP Specimen Protocol. I certify that I have personally reviewed this examination and agree with Betsy Benitez's report. Goyo Burnham M.D.    X-RAY CHEST 1 VIEW    Result Date: 2/17/2025  IMPRESSION: No pneumothorax appreciated post thoracentesis. Pleural effusions    CT CHEST/ABDOMEN/PELVIS WITH IV CONTRAST    Result Date: 2/16/2025  IMPRESSION: Extrinsic narrowing of the right left proximal bronchi There are bilateral large pleural effusions left is larger than right with near complete opacification left hemithorax. There is associated volume loss and or consolidation in the left lung and the right lower middle lobe.. There is reflux of contrast into the hepatic veins with a prominent IVC this can be seen in right heart dysfunction.. 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 Bilateral renal cysts limited evaluation due to streak artifact... Diverticulosis.There is high density material within the sigmoid colon lack of noncontrast imaging limits evaluation for bleeding, correlate clinically    CT HEAD WITHOUT IV CONTRAST    Result Date: 2/16/2025  IMPRESSION: Generalized atrophy and small vessel ischemic change..     X-RAY CHEST 1 VIEW    Result Date: 2/16/2025  IMPRESSION: Please see comment.     CT ANGIOGRAPHY ABDOMEN PELVIS WITH AND WITHOUT IV CONTRAST    Result Date: 2/6/2025  IMPRESSION: 1.  Contrast extravasation/active bleeding in the proximal sigmoid colon within the rectosigmoid stump.  Circumferential wall thickening of the distal sigmoid colon and rectum with fat stranding, appearance of some degree of acute inflammation. 2.  Left mid descending colostomy.  Diffuse colonic diverticula. 3.  Partially imaged cardiomegaly, findings of right-sided heart  dysfunction, pleural effusions, body wall edema. 4.  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 Report agrees with preliminary radiologist report. Interpreted by: Janee Lovell MD, Feb 06, 2025 02:45 AM Interpreted by: Janee Lovell MD, Feb 06, 2025 02:49 AM Discussed with: Nurse Fabienne, who confirmed to Ass SBD relaying report to Dr at 02:47 AM and the results were critically read back    IR THORACENTESIS    Result Date: 2/5/2025  IMPRESSION: Successful ultrasound-guided left thoracentesis with 800 mL removed and discarded. I certify that I have personally reviewed this examination and agree with Betsy Benitez's report. Gigi Berry M.D.    X-RAY CHEST 1 VIEW    Result Date: 2/5/2025  IMPRESSION: Decreased pleural effusions. No pneumothorax.      OUTPATIENT  FOLLOW-UP / REFERRALS / PENDING TESTS      Outpatient Follow-Up Appointments  Encounter Information    This patient does not currently have any appointments scheduled.         Referrals  No orders of the defined types were placed in this encounter.      Test Results Pending at Discharge  Pending Inpatient Labs       Order Current Status    AFB Culture Pleural Fluid, Left In process    AFB culture Pleural Fluid, Left In process    AFB stain Pleural Fluid, Left In process    Cytology, Fluids Pleural Fluid, Left In process    Fungal Culture Pleural Fluid, Left In process    Fungal Stain Pleural Fluid, Left In process    Fungal culture / smear Pleural Fluid, Left In process    Blood Culture Blood, Venous Preliminary result    Blood Culture Blood, Venous Preliminary result    Body Fluid Culture/Smear Pleural Fluid, Left Preliminary result            Important Issues to Address in Follow-Up    DISCHARGE DISPOSITION      Disposition:      Code Status At Discharge: DNR (A.N.D.)    Physician Order for Life-Sustaining Treatment Document Status        No documents found                     ATTENDING  DOCUMENTATION  ALSO SEE ATTENDING ATTESTATION SECTION OF NOTE

## 2025-02-20 NOTE — PROGRESS NOTES
"  NAME: Samy Elena    MRN: [id number]    YOB: 1930    Event Review:    Initial TCM Patient Outreach Date: 02/20/25    Assessment completed with: Family Member (MARKO--SON)  Patient stated reason for hospitalization: CONFUSION  Discharge Diagnosis: Confusion    Patient readmitted in the last 30 days: (!) Yes  Discharging Facility: [facility]  Date of Last Admission: 02/16/25  Date of Last Discharge: 02/19/25               Patient's perception of their health status since discharge: Improving    HPI: [facility] CALL #1   Spoke with patient's son Marko today on number: [phone]   Pt presented to [facility] on 2/16/25 with confusion.   PMH: Anemia, CHF, CAD, AFIB, Recurrent Pleural Effusion, Thrombocytopenia. CKD stage 3     \"Per EMS, patient sent to ED due to concern from caretaker that he has been confused and weak.  Patient denies any acute complaints.  He reports that his caretaker does not know him well and that is why he/she was concerned.  Patient denies any decreased p.o. intake/chest pain/shortness of breath/cough/fever.  Systolic blood pressure 88mmhg-110s mmhg and normal HR, with EMS. SBP runs on the low side at baseline  (sbp 110s)  Patient denies any decreased p.o. intake.  Denies any BRBPR or via ostomy.  Denies any bloody ostomy out;  No fall  Later in ED course, son arrived to the bedside.  Reports that last night the patient was very confused.  Talking about the pain back student loans which she has not done for many years.  The patient in the ER this morning, he felt the patient was still confused.  Much more animated than he typically would be.  Son thinks that his personality stiffer than usual and that is very typical for when the patient has a urinary tract infection.\"  Pt deemed stable and was discharged to home with services ([facility]) on 2/19/25.      Marko states pt is improved since discharge. Marko spoke to his dad last night. Pt lives in a 2 " story home with paid CG Melinda 24/7. Pt is wheelchair or bed bound. Pt needs assistance with ADL's.    Marko states pt with no complaints of pain, chest pain, shortness of breath or dizziness as of last night.   Marko states pt denies N/V.   Pt has ostomy.   Marko states pt has good appetite.   Marko states pt denies swelling in extremities.   New medications reviewed. Pt compliant.   Family/CG equipped to manage care.     [facility] will provide nursing services to patient. Pt is entered in [facility] for service. Marko would like to be contacted for service. AC sent secure chat to team to contact son.     Discharge instructions reviewed with son, son verbalizes understanding. Reviewed general recommendations regarding fall safety.   Son will contact PCP. Pt last saw PCP in home on 2/13/25.       ACM explained hospital to home program.   ACM to follow-up in 1 week on/around 2/27/25     Review of Systems   Constitutional:  Positive for activity change.   Respiratory:  Negative for shortness of breath.    Cardiovascular:  Negative for chest pain and leg swelling.   Gastrointestinal:  Negative for nausea and vomiting.        Pt has ostomy per son and chart notes    Genitourinary:  Negative for difficulty urinating.   Musculoskeletal:  Positive for gait problem (wheel chair or bed bound per pt).   Neurological:  Negative for dizziness and light-headedness.       Medication Review:  Medication Review: Yes     Reported by: Child  Any new medications prescribed at discharge?: Yes     New prescriptions filled?: Yes   Reviewed AVS (Discharge Instructions)?: Yes         Acute Pain:  Acute pain: No                Chronic Pain:  Chronic pain: No                Diet/Nutrition:  Type of Diet: Renal, Regular (SON STATES PT APPETITE IS VERY GOOD.)  Diet Adherence: Adherent with diet          Home Care Services:  Home Care Agency: [facility]  Type of Home Care Services: Palliative Care (PT ALSO HAS 24/7 CG IN HOME--MELINDA.)  Home Care Interventions:  Accepted post-discharge     Post-Discharge Durable Medical Equipment::  Durable Medical Equipment: Manual wheelchair, Hospital bed, Pulse Oximeter, Blood pressure cuff  Does patient's condition require a scale?: No     Oxygen Use: No              DME Interventions: No intervention required    Home Management:  Living Arrangement: Alone, Paid Caregiver  Support System:: Paid Caregiver  Type of Residence: 2 story house  Home Monitoring: Weight, Pulse Oximetry, Blood Pressure  Any patient reported falls in the last 3 months?: No       Appointment Scheduling:  PCP appointment scheduled: Pia (KARLA ECHEVERRIA--MAKES HOME VISTS TO PT)              Patient Scheduling Dispositions: Patient requested family/caregiver to schedule their appointment     Follow-Ups:   Relevant Labs & Tests: NONE NOTED  Relevant Specialist Follow-ups: [facility] OFFERED H2H SIMA CALL--NISHI WINSTON.    Interventions/ Care Coordination:  Interventions/ Care Coordination: Encouraged patient to call PCP/Specialist  General Education: Respiratory precautions (flu, colds, pneumonia, RSV, COVID-19), Nutrition and hydration instructions, Infection prevention instructions, Falls and home safety precautions     Initiated Care Plan: RAYMOND          Reviewed signs/symptoms of worsening condition or complication that necessitate a call to the Physician's office.  Educated patient on access to care.  RN phone number given for future care management.    Pilar Lockhart RN

## 2025-02-21 LAB
GRAM STN SPEC: NORMAL
GRAM STN SPEC: NORMAL
MICROORGANISM SPEC CULT: NORMAL

## 2025-02-25 ENCOUNTER — HOME VISIT (OUTPATIENT)
Dept: PALLIATIVE CARE | Facility: CLINIC | Age: 89
End: 2025-02-25
Attending: STUDENT IN AN ORGANIZED HEALTH CARE EDUCATION/TRAINING PROGRAM
Payer: MEDICARE

## 2025-02-25 VITALS
BODY MASS INDEX: 21.43 KG/M2 | HEART RATE: 64 BPM | TEMPERATURE: 97.7 F | HEIGHT: 72 IN | DIASTOLIC BLOOD PRESSURE: 60 MMHG | RESPIRATION RATE: 16 BRPM | SYSTOLIC BLOOD PRESSURE: 104 MMHG | OXYGEN SATURATION: 96 %

## 2025-02-25 DIAGNOSIS — F32.A DEPRESSION, UNSPECIFIED DEPRESSION TYPE: ICD-10-CM

## 2025-02-25 DIAGNOSIS — I50.23 ACUTE ON CHRONIC SYSTOLIC (CONGESTIVE) HEART FAILURE: ICD-10-CM

## 2025-02-25 DIAGNOSIS — Z93.3: ICD-10-CM

## 2025-02-25 DIAGNOSIS — N18.4 CKD (CHRONIC KIDNEY DISEASE) STAGE 4, GFR 15-29 ML/MIN (CMS/HCC): ICD-10-CM

## 2025-02-25 DIAGNOSIS — R53.81 DEBILITY: ICD-10-CM

## 2025-02-25 DIAGNOSIS — R13.10 DYSPHAGIA, UNSPECIFIED TYPE: ICD-10-CM

## 2025-02-25 DIAGNOSIS — Z51.5 PALLIATIVE CARE BY SPECIALIST: Primary | ICD-10-CM

## 2025-02-25 DIAGNOSIS — K57.91 GASTROINTESTINAL HEMORRHAGE ASSOCIATED WITH INTESTINAL DIVERTICULOSIS: ICD-10-CM

## 2025-02-25 PROCEDURE — 99497 ADVNCD CARE PLAN 30 MIN: CPT | Mod: 25

## 2025-02-25 PROCEDURE — 99350 HOME/RES VST EST HIGH MDM 60: CPT | Mod: 25

## 2025-02-25 RX ORDER — BUPROPION HYDROCHLORIDE 150 MG/1
150 TABLET ORAL DAILY
COMMUNITY
Start: 2025-02-14

## 2025-02-25 ASSESSMENT — ENCOUNTER SYMPTOMS
CONFUSION: 1
VOMITING: 0
HEMATURIA: 0
MYALGIAS: 1
WEAKNESS: 1
FATIGUE: 1
SHORTNESS OF BREATH: 1
HALLUCINATIONS: 1
BLOOD IN STOOL: 0
SPEECH DIFFICULTY: 1
NAUSEA: 0
DYSURIA: 0
CHEST TIGHTNESS: 0
SLEEP DISTURBANCE: 1
ANAL BLEEDING: 0

## 2025-02-25 ASSESSMENT — PAIN SCALES - GENERAL: PAINLEVEL_OUTOF10: 0-NO PAIN

## 2025-02-25 NOTE — PROGRESS NOTES
Palliative Home Based Care Initial Visit    Subjective     Patient ID: Samy Elena is a 89 y.o. male.  MRN: [id number]  Date/ Time Visit Made:  Referring Physician: Mariaa Gonzales Md  [address] [facility] [address]  [place] [id number]  Primary Care Physician:Zachery Hernandez MD       Consent obtained from patient and all parties present in the room? yes     I have obtained the consent of everyone present in the room to make an audio recording of this visit to assist me in documenting the encounter in the EMR.  [place]    The start time of this Face to Face visit was 11:30 AM.  The end time of this Face to Face visit was 12:49 PM.      I spent  on this date of service performing the following activities: obtaining history, performing examination, documenting, preparing for visit, obtaining / reviewing records, providing counseling and education, and independently reviewing study/studies.  This time excludes time spent discussing ACP.    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  History of Present Illness    Patient received seating in wheelchair accompanied by live-in caregiver, Melinda. Melinda has resided with patient in his home for the past two years. Son, Marko, later joined the conversation in person. Reports experiencing intermittent episodes of depression, particularly in the mornings, which improve by lunch. Reports occasional episodes of shortness of breath.      Caregiver and son report patient experiences auditory and visual hallucinations,  especially overnight, for the past two years. He often awakens overnight and can be combative. Mirtazapine was ineffective. Patient exhibited episode of agitation during visit. He was otherwise very pleasant and conversant.     Patient is a . He has four adult sons with whom he remains close. Three sons are within driving distance, while one [place]. 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 He has a PhD and has been published. 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  .   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)     Coronary artery disease     Disease of thyroid gland     Will catheter in place     6/25 not at present    GI (gastrointestinal bleed)     Hypertension     Myocardial infarction (CMS/HCC)     Orthostatic hypotension     TIA (transient ischemic attack)      Past Surgical History:    Past Surgical History   Procedure Laterality Date    cataract extraction right eye with implant and limbal relaxing incision Right 7/18/2024    Performed by Hayder Moses MD at  OR [facility]    Cataract extraction w/ intraocular lens implant Left     Cataract extraction with LRI and anterior vitrectomy left eye Left 11/16/2023    Performed by Hayder Moses MD at  OR [facility]    Cholecystectomy      Colostomy      COLOSTOMY LAPAROSCOPIC N/A 8/3/2022    Performed by Mat Bryant MD at [facility] OR    Coronary artery bypass graft      DIAGNOSTIC FLEXIBLE SIGMOIDOSCOPY WITH COLLECTION OF SPECIMEN BY WASHING N/A 11/27/2024    Performed by David Holcomb MD at [facility] GI    DIAGNOSTIC FLEXIBLE SIGMOIDOSCOPY WITH COLLECTION OF SPECIMEN BY WASHING N/A 10/18/2024    Performed by Jessica  MD [place] at [facility]    DIAGNOSTIC UPPER GASTROINTESTINAL ENDOSCOPY WITH COLLECTION OF SPECIMEN BY WASHING N/A 11/27/2024    Performed by David Holcomb MD at [facility]    FLEXIBLE SIGMOIDOSCOPY WITH BIOPSY N/A 10/23/2024    Performed by David Holcomb MD at [facility]    INCISION AND DRAINAGE WITH DEBRIDMENT OF BUTTOCK, AND PERINEUM N/A 8/2/2022    Performed by Mat Bryant MD at [facility]    Joint replacement Left     Knee    RIGHT HEART CATH N/A 8/4/2022    Performed by Cristal Lacey MD at [facility]ARDIAC CATH/EP    Thyroidectomy      WASHOUT OF PERINEAL WOUND, COLONIC LAVAGE N/A 8/3/2022    Performed by Mat Bryant MD at [facility]     Family History:  family history is not on file.  Social History:    Social History     Socioeconomic History    Marital status:    Tobacco Use    Smoking status: Never    Smokeless tobacco: Never   Vaping Use    Vaping status: Never Used   Substance and Sexual Activity    Alcohol use: Not Currently     Comment: social    Drug use: Never    Sexual activity: Defer     Social Drivers of Health     Financial Resource Strain: Low Risk  (8/4/2023)    Overall Financial Resource Strain (CARDIA)     Difficulty of Paying Living Expenses: Not hard at all   Food Insecurity: No Food Insecurity (2/16/2025)    Hunger Vital Sign     Worried About Running Out of Food in the Last Year: Never true     Ran Out of Food in the Last Year: Never true   Transportation Needs: No Transportation Needs (2/19/2025)    PRAPARE - Transportation     Lack of Transportation (Medical): No     Lack of Transportation (Non-Medical): No   Housing Stability: Low Risk  (2/19/2025)    Housing Stability Vital Sign     Unable to Pay for Housing in the Last Year: No     Number of Times Moved in the Last Year: 0     Homeless in the Last Year: No       Jew:    Spiritual Assessment: Karoline and Belief: Rastafari. Retired Deacon. Receives communion visits from [facility] .    Service:   Environmental Assessment:  Home environment safe and suitable for care  Caregiver [place]: No burden of care issues identified and Caregiver willing and able to safely provide needed care  Data Review  Lab Studies: Pertinent radiology and labs reviewed  Labs last six months:    Admission on 02/16/2025, Discharged on 02/19/2025   Component Date Value Ref Range Status    WBC 02/16/2025 4.69  3.80 - 10.50 K/uL Final    RBC 02/16/2025 2.78 (L)  4.50 - 5.80 M/uL Final    Hemoglobin 02/16/2025 8.6 (L)  13.7 - 17.5 g/dL Final    Hematocrit 02/16/2025 28.1 (L)  40.1 - 51.0 % Final    MCV 02/16/2025 101.1 (H)  83.0 - 98.0 fL Final    MCH 02/16/2025 30.9  28.0 - 33.2 pg Final    MCHC 02/16/2025 30.6 (L)  32.2 - 36.5 g/dL Final    RDW 02/16/2025 22.2 (H)  11.6 - 14.4 % Final    Platelets 02/16/2025 94 (L)  150 - 350 K/uL Final    MPV 02/16/2025 9.8  9.4 - 12.4 fL Final    Differential Type 02/16/2025 Auto   Final    nRBC 02/16/2025 0.0  <=0.0 % Final    Immature Granulocytes 02/16/2025 0.4  % Final    Neutrophils 02/16/2025 73.4  % Final    Lymphocytes 02/16/2025 11.1  % Final    Monocytes 02/16/2025 11.9  % Final    Eosinophils 02/16/2025 2.6  % Final    Basophils 02/16/2025 0.6  % Final    Immature Granulocytes, Absolute 02/16/2025 0.02  0.00 - 0.08 K/uL Final    Neutrophils, Absolute 02/16/2025 3.44  1.70 - 7.00 K/uL Final    Lymphocytes, Absolute 02/16/2025 0.52 (L)  1.20 - 3.50 K/uL Final    Monocytes, Absolute 02/16/2025 0.56  0.30 - 1.00 K/uL Final    Eosinophils, Absolute 02/16/2025 0.12  0.04 - 0.54 K/uL Final    Basophils, Absolute 02/16/2025 0.03  0.01 - 0.10 K/uL Final    Platelet Estimate 02/16/2025 Decreased (51,000-149,000)   Final    Anisocytosis 02/16/2025 1+   Final    Macrocytes 02/16/2025 1+   Final    Ovalocytes 02/16/2025 Occasional   Final    Christianne Cells 02/16/2025 Occasional   Final    Sodium 02/16/2025 141  136 - 145 mEQ/L Final    Potassium 02/16/2025 4.3  3.5 - 5.1 mEQ/L Final    Results  obtained on plasma. Plasma Potassium values may be up to 0.4 mEQ/L less than serum values. The differences may be greater for patients with high platelet or white cell counts.    Chloride 02/16/2025 111 (H)  98 - 107 mEQ/L Final    CO2 02/16/2025 25  21 - 31 mEQ/L Final    BUN 02/16/2025 26 (H)  7 - 25 mg/dL Final    Creatinine 02/16/2025 1.5 (H)  0.7 - 1.3 mg/dL Final    Glucose 02/16/2025 100 (H)  70 - 99 mg/dL Final    Calcium 02/16/2025 7.5 (L)  8.6 - 10.3 mg/dL Final    AST (SGOT) 02/16/2025 28  13 - 39 IU/L Final    ALT (SGPT) 02/16/2025 6 (L)  7 - 52 IU/L Final    Alkaline Phosphatase 02/16/2025 83  34 - 125 IU/L Final    Total Protein 02/16/2025 6.5  6.0 - 8.2 g/dL Final    Test performed on plasma which typically contains approximately 0.4 g/dL more protein than serum.    Albumin 02/16/2025 2.6 (L)  3.5 - 5.7 g/dL Final    Bilirubin, Total 02/16/2025 0.6  0.3 - 1.2 mg/dL Final    eGFR 02/16/2025 43.1 (L)  >=60.0 mL/min/1.73m*2 Final    Calculation based on the Chronic Kidney Disease Epidemiology Collaboration (CKD-EPI) equation refit without adjustment for race.    Anion Gap 02/16/2025 5  3 - 15 mEQ/L Final    Ventricular rate 02/16/2025 59   Final    QRS duration 02/16/2025 170   Final    QT Interval 02/16/2025 516   Final    QTC Calculation(Bazett) 02/16/2025 510   Final    R Axis 02/16/2025 95   Final    T Wave Axis 02/16/2025 117   Final    Color, Urine 02/16/2025 Yellow  Yellow, Colorless Final    Clarity, Urine 02/16/2025 Clear  Clear Final    Specific Gravity, Urine 02/16/2025 1.015  1.005 - 1.030 Final    pH, Urine 02/16/2025 6.0  4.5 - 8.0 Final    Leukocyte Esterase 02/16/2025 +1 (A)  Negative Final    Nitrite, Urine 02/16/2025 Negative  Negative Final    Protein, Urine 02/16/2025 Negative  Negative Final    Glucose, Urine 02/16/2025 Negative  Negative mg/dL Final    Ketones, Urine 02/16/2025 Negative  Negative mg/dL Final    Urobilinogen, Urine 02/16/2025 0.2  <2.0 EU/dL EU/dL Final    Bilirubin,  Urine 02/16/2025 Negative  Negative mg/dL Final    Blood, Urine 02/16/2025 Trace (A)  Negative Final    The sensitivity of the occult blood test is equivalent to approximately 4 intact RBC/HPF.    Lactate 02/16/2025 1.5  0.4 - 2.0 mmol/L Final    High Sens Troponin I 02/16/2025 19.7 (H)  <15.0 pg/mL Final    RBC, Urine 02/16/2025 5 TO 9 (A)  0 TO 4 /HPF Final    WBC, Urine 02/16/2025 10 TO 20 (A)  0 TO 3 /HPF Final    Squamous Epithelial 02/16/2025 None Seen  None Seen /hpf Final    Hyaline Cast 02/16/2025 None Seen  None Seen /lpf Final    Bacteria, Urine 02/16/2025 None Seen  None Seen /HPF Final    Transitional Epithelial 02/16/2025 Rare (A)  None Seen /hpf Final    Renal Epithelial 02/16/2025 Rare (A)  None Seen /hpf Final    High Sens Troponin I 02/16/2025 17.5 (H)  <15.0 pg/mL Final    Urine Culture 02/16/2025 No Growth (Limit of detection 1000 cfu/mL)   Final    SARS-CoV-2 (COVID-19) 02/16/2025 Negative  Negative Final    Influenza A 02/16/2025 Negative  Negative Final    Influenza B 02/16/2025 Negative  Negative Final    Respiratory Syncytial Virus 02/16/2025 Negative  Negative Final    pH, Venous 02/16/2025 7.36  7.32 - 7.42 Final    pCO2, Venous 02/16/2025 45  41 - 51 mm Hg Final    pO2, Venous 02/16/2025 <20 (L)  25 - 40 mm Hg Final    PO2 values <30 have not been validated by the laboratory. Suggest clinical correlation.    HCO3, Venous 02/16/2025 23.4  21.0 - 28.0 mEQ/L Final    Base Excess, Venous 02/16/2025 -0.2  mEQ/L Final    TCO2, Venous 02/16/2025 26.8  22.0 - 32.0 mEQ/L Final    Lactate 02/16/2025 1.6  0.4 - 2.0 mmol/L Final    Source Of Oxygen 02/16/2025 ra   Final    FIO2 02/16/2025 21   Final    Culture 02/16/2025 No growth at 96 hours   Final    Culture 02/16/2025 No growth at 96 hours   Final    Legionella pneumophila Serogp 1 Ur* 02/16/2025 Negative  Negative Final    MRSA DNA, Nares 02/16/2025 Negative  Negative Final    No methicillin resistant Staphylococcus aureus (MRSA) detected.     Iron 02/16/2025 73  35 - 150 ug/dL Final    TIBC 02/16/2025 265 (L)  270 - 460 ug/dL Final    UIBC 02/16/2025 192  180 - 360 ug/dL Final    Iron Saturation 02/16/2025 28  15 - 45 % Final    Ferritin 02/16/2025 93  24 - 250 ng/mL Final    Culture 02/17/2025 No growth at 96 hours   Final    Gram Stain Result 02/17/2025 1+ WBC   Final    Gram Stain Result 02/17/2025 No organisms seen   Final    LD, Fluid 02/17/2025 67  U/L Final    Reference range has not been established for this body fluid type. Suggest clinical correlation and literature review for clinical significance.    Case Report 02/17/2025    Final                    Value:Non-gynecologic Cytology                          Case: [id number]                                   Authorizing Provider:  Imelda Altamirano,   Collected:           02/17/2025 1010                                     MD                                                                           Ordering Location:     [facility]    Received:            02/17/2025 1022                                     Emergency Department                                                         Pathologist:           Lazaro Barkley DO                                                         Specimen:    Pleural Fluid, Left                                                                        INTERPRETATION 02/17/2025    Final                    Value:A. Pleural Fluid, Left, Fluids/Brushings, Thinprep and Block:     No malignant cells identified.        **ATTENTION PATIENTS**  **The findings in this report have been made available for review potentially before your provider has had a chance to review and discuss the results with you.  Please allow time for your provider to review your results.  If you have any questions or concerns about these results, please contact the healthcare provider who ordered the test.**        Gross Description 02/17/2025    Final                     Value:A.  900 ml cloudy, hilario fluid received fresh  Slides: 1 ThinPrep, 1 Cell Block       Protein, Fluid 02/17/2025 <3.0  g/dL Final    Reference range has not been established for this body fluid type. Suggest clinical correlation and literature review for clinical significance.    Glucose, Fluid 02/17/2025 124.0  mg/dL Final    Reference range has not been established for this body fluid type. Suggest clinical correlation and literature review for clinical significance.    pH, Fluid 02/17/2025 7.5   Final    Fungus Stain 02/17/2025 No fungal elements seen   Final    Fungal Culture 02/17/2025 No Fungus Isolated to Date   Preliminary    Fluid Type 02/17/2025 Pleural Fluid   Final    Fluid Source 02/17/2025 Pleural Fluid, Left   Final    Color. Fluid 02/17/2025 Light Yellow  - Final    Appearance, Fluid 02/17/2025 Hazy  - Final    WBC, Fluid 02/17/2025 256 (H)  0 - 200 cells/cu mm Final    RBC, Fluid 02/17/2025 <10,000  0 - 10,000 cells/cu mm Final    AFB Stain 02/17/2025 No acid fast bacilli seen  No acid fast bacilli seen Final    AFB Culture 02/17/2025 No Acid Fast Bacilli Isolated to Date   Preliminary    Lymphocytes 02/17/2025 88  % Final    Mononuclear cells 02/17/2025 12  % Final    includes Monocytes, Macrophages, & Mesothelial Cells    LD 02/16/2025 164  98 - 271 IU/L Final   No results displayed because visit has over 200 results.      No results displayed because visit has over 200 results.      Appointment on 12/17/2024   Component Date Value Ref Range Status    Sodium 12/17/2024 139  136 - 145 mEQ/L Final    Potassium 12/17/2024 4.3  3.5 - 5.1 mEQ/L Final    Chloride 12/17/2024 109 (H)  98 - 107 mEQ/L Final    CO2 12/17/2024 26  21 - 31 mEQ/L Final    BUN 12/17/2024 28 (H)  7 - 25 mg/dL Final    Creatinine 12/17/2024 1.4 (H)  0.7 - 1.3 mg/dL Final    Glucose 12/17/2024 164 (H)  70 - 99 mg/dL Final    Calcium 12/17/2024 8.4 (L)  8.6 - 10.3 mg/dL Final    eGFR 12/17/2024 46.9 (L)  >=60.0 mL/min/1.73m*2  Final    Calculation based on the Chronic Kidney Disease Epidemiology Collaboration (CKD-EPI) equation refit without adjustment for race.    Anion Gap 12/17/2024 4  3 - 15 mEQ/L Final    WBC 12/17/2024 4.39  3.80 - 10.50 K/uL Final    RBC 12/17/2024 2.56 (L)  4.50 - 5.80 M/uL Final    Hemoglobin 12/17/2024 8.5 (L)  13.7 - 17.5 g/dL Final    Hematocrit 12/17/2024 27.6 (L)  40.1 - 51.0 % Final    MCV 12/17/2024 107.8 (H)  83.0 - 98.0 fL Final    MCH 12/17/2024 33.2  28.0 - 33.2 pg Final    MCHC 12/17/2024 30.8 (L)  32.2 - 36.5 g/dL Final    RDW 12/17/2024 19.9 (H)  11.6 - 14.4 % Final    Platelets 12/17/2024 98 (L)  150 - 350 K/uL Final    ALL RESULTS HAVE BEEN RECHECKED    MPV 12/17/2024 10.8  9.4 - 12.4 fL Final    Magnesium 12/17/2024 2.0  1.8 - 2.5 mg/dL Final   Admission on 11/26/2024, Discharged on 11/28/2024   Component Date Value Ref Range Status    WBC 11/26/2024 4.79  3.80 - 10.50 K/uL Final    RBC 11/26/2024 2.47 (L)  4.50 - 5.80 M/uL Final    Hemoglobin 11/26/2024 8.1 (L)  13.7 - 17.5 g/dL Final    Hematocrit 11/26/2024 26.1 (L)  40.1 - 51.0 % Final    MCV 11/26/2024 105.7 (H)  83.0 - 98.0 fL Final    MCH 11/26/2024 32.8  28.0 - 33.2 pg Final    MCHC 11/26/2024 31.0 (L)  32.2 - 36.5 g/dL Final    RDW 11/26/2024 19.2 (H)  11.6 - 14.4 % Final    Platelets 11/26/2024 103 (L)  150 - 350 K/uL Final    MPV 11/26/2024 10.4  9.4 - 12.4 fL Final    Differential Type 11/26/2024 Auto   Final    nRBC 11/26/2024 0.0  <=0.0 % Final    Immature Granulocytes 11/26/2024 0.4  % Final    Neutrophils 11/26/2024 73.5  % Final    Lymphocytes 11/26/2024 11.5  % Final    Monocytes 11/26/2024 11.3  % Final    Eosinophils 11/26/2024 2.5  % Final    Basophils 11/26/2024 0.8  % Final    Immature Granulocytes, Absolute 11/26/2024 0.02  0.00 - 0.08 K/uL Final    Neutrophils, Absolute 11/26/2024 3.52  1.70 - 7.00 K/uL Final    Lymphocytes, Absolute 11/26/2024 0.55 (L)  1.20 - 3.50 K/uL Final    Monocytes, Absolute 11/26/2024 0.54  0.30  - 1.00 K/uL Final    Eosinophils, Absolute 11/26/2024 0.12  0.04 - 0.54 K/uL Final    Basophils, Absolute 11/26/2024 0.04  0.01 - 0.10 K/uL Final    Sodium 11/26/2024 140  136 - 145 mEQ/L Final    Potassium 11/26/2024 4.1  3.5 - 5.1 mEQ/L Final    Results obtained on plasma. Plasma Potassium values may be up to 0.4 mEQ/L less than serum values. The differences may be greater for patients with high platelet or white cell counts.    Chloride 11/26/2024 108 (H)  98 - 107 mEQ/L Final    CO2 11/26/2024 24  21 - 31 mEQ/L Final    BUN 11/26/2024 26 (H)  7 - 25 mg/dL Final    Creatinine 11/26/2024 1.6 (H)  0.7 - 1.3 mg/dL Final    Glucose 11/26/2024 135 (H)  70 - 99 mg/dL Final    Calcium 11/26/2024 8.4 (L)  8.6 - 10.3 mg/dL Final    AST (SGOT) 11/26/2024 28  13 - 39 IU/L Final    ALT (SGPT) 11/26/2024 5 (L)  7 - 52 IU/L Final    Alkaline Phosphatase 11/26/2024 97  34 - 125 IU/L Final    Total Protein 11/26/2024 7.4  6.0 - 8.2 g/dL Final    Test performed on plasma which typically contains approximately 0.4 g/dL more protein than serum.    Albumin 11/26/2024 2.9 (L)  3.5 - 5.7 g/dL Final    Bilirubin, Total 11/26/2024 0.6  0.3 - 1.2 mg/dL Final    eGFR 11/26/2024 39.9 (L)  >=60.0 mL/min/1.73m*2 Final    Calculation based on the Chronic Kidney Disease Epidemiology Collaboration (CKD-EPI) equation refit without adjustment for race.    Anion Gap 11/26/2024 8  3 - 15 mEQ/L Final    Specimen Expiration 11/26/2024 11/29/2024   Final    Antibody Screen 11/26/2024 Negative   Final    ABO 11/26/2024 O   Final    Rh Factor 11/26/2024 Positive   Final    History Check 11/26/2024 Previous type on file   Final    Lactate 11/26/2024 2.1 (H)  0.4 - 2.0 mmol/L Final    Ventricular rate 11/26/2024 58   Final    QRS duration 11/26/2024 170   Final    QT Interval 11/26/2024 550   Final    QTC Calculation(Bazett) 11/26/2024 539   Final    R Axis 11/26/2024 66   Final    T Wave Axis 11/26/2024 -13   Final    Product Code 11/26/2024 [id number]    Final    Unit ID 11/26/2024 [id number]   Final    Unit ABO 11/26/2024 O   Final    Unit RH 11/26/2024 Positive   Final    Crossmatch 11/26/2024 Compatible   Final    Product Status 11/26/2024 PT   Final    Unit Expiration Date/Time 11/26/2024 [id number]   Final    ISBT Code 11/26/2024 5100   Final    Lactate 11/27/2024 1.9  0.4 - 2.0 mmol/L Final    Sodium 11/27/2024 139  136 - 145 mEQ/L Final    Potassium 11/27/2024 4.2  3.5 - 5.1 mEQ/L Final    Results obtained on plasma. Plasma Potassium values may be up to 0.4 mEQ/L less than serum values. The differences may be greater for patients with high platelet or white cell counts.    Chloride 11/27/2024 109 (H)  98 - 107 mEQ/L Final    CO2 11/27/2024 22  21 - 31 mEQ/L Final    BUN 11/27/2024 27 (H)  7 - 25 mg/dL Final    Creatinine 11/27/2024 1.5 (H)  0.7 - 1.3 mg/dL Final    Glucose 11/27/2024 111 (H)  70 - 99 mg/dL Final    Calcium 11/27/2024 8.2 (L)  8.6 - 10.3 mg/dL Final    eGFR 11/27/2024 43.1 (L)  >=60.0 mL/min/1.73m*2 Final    Calculation based on the Chronic Kidney Disease Epidemiology Collaboration (CKD-EPI) equation refit without adjustment for race.    Anion Gap 11/27/2024 8  3 - 15 mEQ/L Final    Magnesium 11/27/2024 1.9  1.8 - 2.5 mg/dL Final    WBC 11/27/2024 4.33  3.80 - 10.50 K/uL Final    RBC 11/27/2024 2.40 (L)  4.50 - 5.80 M/uL Final    Hemoglobin 11/27/2024 7.9 (L)  13.7 - 17.5 g/dL Final    Hematocrit 11/27/2024 24.5 (L)  40.1 - 51.0 % Final    MCV 11/27/2024 102.1 (H)  83.0 - 98.0 fL Final    MCH 11/27/2024 32.9  28.0 - 33.2 pg Final    MCHC 11/27/2024 32.2  32.2 - 36.5 g/dL Final    RDW 11/27/2024 19.8 (H)  11.6 - 14.4 % Final    Platelets 11/27/2024 89 (L)  150 - 350 K/uL Final    CONSISTENT WITH PREVIOUS RESULTSSPECIMEN QUALITY CHECKED    MPV 11/27/2024 10.9  9.4 - 12.4 fL Final    Product Code 11/27/2024 R8955P58   Final    Unit ID 11/27/2024 [id number]   Final    Unit ABO 11/27/2024 O   Final    Unit RH 11/27/2024 Negative   Final     Crossmatch 11/27/2024 Compatible   Final    Product Status 11/27/2024 PT   Final    Unit Expiration Date/Time 11/27/2024 [id number]   Final    ISBT Code 11/27/2024 9500   Final    contacted karley at [phone]    WBC 11/27/2024 4.19  3.80 - 10.50 K/uL Final    RBC 11/27/2024 2.73 (L)  4.50 - 5.80 M/uL Final    Hemoglobin 11/27/2024 8.7 (L)  13.7 - 17.5 g/dL Final    Hematocrit 11/27/2024 27.4 (L)  40.1 - 51.0 % Final    MCV 11/27/2024 100.4 (H)  83.0 - 98.0 fL Final    MCH 11/27/2024 31.9  28.0 - 33.2 pg Final    MCHC 11/27/2024 31.8 (L)  32.2 - 36.5 g/dL Final    RDW 11/27/2024 21.2 (H)  11.6 - 14.4 % Final    Platelets 11/27/2024 82 (L)  150 - 350 K/uL Final    CONFIRMED WITH SMEAR ESTIMATE    MPV 11/27/2024 10.2  9.4 - 12.4 fL Final    WBC 11/27/2024 4.37  3.80 - 10.50 K/uL Final    RBC 11/27/2024 2.77 (L)  4.50 - 5.80 M/uL Final    Hemoglobin 11/27/2024 8.9 (L)  13.7 - 17.5 g/dL Final    Hematocrit 11/27/2024 28.0 (L)  40.1 - 51.0 % Final    MCV 11/27/2024 101.1 (H)  83.0 - 98.0 fL Final    MCH 11/27/2024 32.1  28.0 - 33.2 pg Final    MCHC 11/27/2024 31.8 (L)  32.2 - 36.5 g/dL Final    RDW 11/27/2024 21.2 (H)  11.6 - 14.4 % Final    Platelets 11/27/2024 85 (L)  150 - 350 K/uL Final    CONSISTENT WITH PREVIOUS RESULTS    MPV 11/27/2024 10.4  9.4 - 12.4 fL Final    Vitamin B-12 11/28/2024 769  180 - 914 pg/mL Final    Folate 11/28/2024 12.3  >=5.8 ng/mL Final    WHO has determined that folate concentrations less than 4 ng/mL should be considered deficient.    Sodium 11/28/2024 140  136 - 145 mEQ/L Final    Potassium 11/28/2024 4.0  3.5 - 5.1 mEQ/L Final    Chloride 11/28/2024 109 (H)  98 - 107 mEQ/L Final    CO2 11/28/2024 23  21 - 31 mEQ/L Final    BUN 11/28/2024 26 (H)  7 - 25 mg/dL Final    Creatinine 11/28/2024 1.4 (H)  0.7 - 1.3 mg/dL Final    Glucose 11/28/2024 88  70 - 99 mg/dL Final    Calcium 11/28/2024 7.9 (L)  8.6 - 10.3 mg/dL Final    eGFR 11/28/2024 46.9 (L)  >=60.0 mL/min/1.73m*2 Final    Calculation  based on the Chronic Kidney Disease Epidemiology Collaboration (CKD-EPI) equation refit without adjustment for race.    Anion Gap 11/28/2024 8  3 - 15 mEQ/L Final    Magnesium 11/28/2024 1.8  1.8 - 2.5 mg/dL Final    WBC 11/28/2024 3.87  3.80 - 10.50 K/uL Final    RBC 11/28/2024 2.73 (L)  4.50 - 5.80 M/uL Final    Hemoglobin 11/28/2024 8.8 (L)  13.7 - 17.5 g/dL Final    Hematocrit 11/28/2024 27.6 (L)  40.1 - 51.0 % Final    MCV 11/28/2024 101.1 (H)  83.0 - 98.0 fL Final    MCH 11/28/2024 32.2  28.0 - 33.2 pg Final    MCHC 11/28/2024 31.9 (L)  32.2 - 36.5 g/dL Final    RDW 11/28/2024 21.1 (H)  11.6 - 14.4 % Final    Platelets 11/28/2024 81 (L)  150 - 350 K/uL Final    CONSISTENT WITH PREVIOUS RESULTS    MPV 11/28/2024 11.2  9.4 - 12.4 fL Final   Orders Only on 11/26/2024   Component Date Value Ref Range Status    Product Code 11/26/2024 [id number]   Final    Unit ID 11/26/2024 [id number]   Final    Unit ABO 11/26/2024 O   Final    Unit RH 11/26/2024 Negative   Final    Product Status 11/26/2024 RE   Final    Unit Expiration Date/Time 11/26/2024 [id number]   Final    ISBT Code 11/26/2024 9500   Final   No results displayed because visit has over 200 results.      Lab Requisition on 10/03/2024   Component Date Value Ref Range Status    Sodium 10/03/2024 140  136 - 145 mEQ/L Final    Potassium 10/03/2024 4.4  3.5 - 5.1 mEQ/L Final    Chloride 10/03/2024 107  98 - 107 mEQ/L Final    CO2 10/03/2024 25  21 - 31 mEQ/L Final    BUN 10/03/2024 29 (H)  7 - 25 mg/dL Final    Creatinine 10/03/2024 1.6 (H)  0.7 - 1.3 mg/dL Final    Glucose 10/03/2024 111 (H)  70 - 99 mg/dL Final    Calcium 10/03/2024 8.6  8.6 - 10.3 mg/dL Final    AST (SGOT) 10/03/2024 39  13 - 39 IU/L Final    ALT (SGPT) 10/03/2024 7  7 - 52 IU/L Final    Alkaline Phosphatase 10/03/2024 116  34 - 125 IU/L Final    Total Protein 10/03/2024 8.1  6.0 - 8.2 g/dL Final    Albumin 10/03/2024 3.2 (L)  3.5 - 5.7 g/dL Final    Bilirubin, Total 10/03/2024 1.1  0.3 -  1.2 mg/dL Final    eGFR 10/03/2024 40.2 (L)  >=60.0 mL/min/1.73m*2 Final    Calculation based on the Chronic Kidney Disease Epidemiology Collaboration (CKD-EPI) equation refit without adjustment for race.    Anion Gap 10/03/2024 8  3 - 15 mEQ/L Final    WBC 10/03/2024 5.24  3.80 - 10.50 K/uL Final    RBC 10/03/2024 3.12 (L)  4.50 - 5.80 M/uL Final    Hemoglobin 10/03/2024 10.2 (L)  13.7 - 17.5 g/dL Final    Hematocrit 10/03/2024 33.1 (L)  40.1 - 51.0 % Final    MCV 10/03/2024 106.1 (H)  83.0 - 98.0 fL Final    MCH 10/03/2024 32.7  28.0 - 33.2 pg Final    MCHC 10/03/2024 30.8 (L)  32.2 - 36.5 g/dL Final    RDW 10/03/2024 19.2 (H)  11.6 - 14.4 % Final    Platelets 10/03/2024 148 (L)  150 - 350 K/uL Final    MPV 10/03/2024 10.4  9.4 - 12.4 fL Final    Differential Type 10/03/2024 Auto   Final    nRBC 10/03/2024 0.0  <=0.0 % Final    Immature Granulocytes 10/03/2024 0.2  % Final    Neutrophils 10/03/2024 73.7  % Final    Lymphocytes 10/03/2024 13.2  % Final    Monocytes 10/03/2024 9.0  % Final    Eosinophils 10/03/2024 3.1  % Final    Basophils 10/03/2024 0.8  % Final    Immature Granulocytes, Absolute 10/03/2024 0.01  0.00 - 0.08 K/uL Final    Neutrophils, Absolute 10/03/2024 3.87  1.70 - 7.00 K/uL Final    Lymphocytes, Absolute 10/03/2024 0.69 (L)  1.20 - 3.50 K/uL Final    Monocytes, Absolute 10/03/2024 0.47  0.30 - 1.00 K/uL Final    Eosinophils, Absolute 10/03/2024 0.16  0.04 - 0.54 K/uL Final    Basophils, Absolute 10/03/2024 0.04  0.01 - 0.10 K/uL Final    Magnesium 10/03/2024 2.2  1.8 - 2.5 mg/dL Final   No results displayed because visit has over 200 results.          Labs Reviewed: I have reviewed the patient's labs to the time of note.  No new clinical concern.  Radiology and Imaging Reviewed: I have independently reviewed the patient's pertinent imaging to the time of note and agree with the reported results.   Cardiac Studies Reviewed: I have independently reviewed the patient's pertinent cardiac studies to  the time of note and agree with the reported results.   Medicine Tests reviewed: No New Tests      Review of Systems   Constitutional:  Positive for fatigue.   Respiratory:  Positive for shortness of breath. Negative for chest tightness.    Cardiovascular:  Positive for leg swelling. Negative for chest pain.   Gastrointestinal:  Negative for anal bleeding, blood in stool, nausea and vomiting.   Genitourinary:  Negative for dysuria and hematuria.   Musculoskeletal:  Positive for myalgias.   Neurological:  Positive for speech difficulty and weakness.   Psychiatric/Behavioral:  Positive for confusion, hallucinations and sleep disturbance.      Objective     Vitals:    02/25/25 1340   BP: 104/60   BP Location: Right upper arm   Patient Position: Sitting   Pulse: 64   Resp: 16   Temp: 36.5 °C (97.7 °F)   TempSrc: Temporal   SpO2: 96%   Height: 1.829 m (6')     Body mass index is 21.43 kg/m².  Allergies:   Allergies   Allergen Reactions    Jardiance [Empagliflozin] Other (see comments)     denise's gangrene         Medications:   Current Outpatient Medications:     aspirin 81 mg enteric coated tablet, Take 81 mg by mouth daily. 9 am, Disp: , Rfl:     bimatoprost (LUMIGAN) 0.01 % ophthalmic drops, Administer 1 drop into the left eye nightly., Disp: , Rfl:     buPROPion XL (WELLBUTRIN XL) 150 mg 24 hr tablet, Take 150 mg by mouth daily., Disp: , Rfl:     calcium, as carbonate, (TUMS) 200 mg calcium (500 mg) chewable tablet, Take 1 tablet by mouth daily. 11 am, Disp: , Rfl:     carboxymethylcellulose (REFRESH PLUS) 0.5 % dropperette, Administer 1 drop into both eyes every2 (two) hours while awake., Disp: , Rfl:     cranberry extract 425 mg capsule, Take 425 mg by mouth daily. 5 pm, Disp: , Rfl:     cyanocobalamin (VITAMIN B12) 500 mcg tablet, Take 500 mcg by mouth every morning. 9 am, Disp: , Rfl:     famotidine (PEPCID) 10 mg tablet, Take 10 mg by mouth daily. 11 am, Disp: , Rfl:     ferrous sulfate 325 mg (65 mg iron)  "tablet, Take 65 mg by mouth daily with breakfast. 11 am, Disp: , Rfl:     finasteride (PROSCAR) 5 mg tablet, Take 5 mg by mouth daily. 9 am, Disp: , Rfl:     levothyroxine (SYNTHROID) 100 mcg tablet, Take 100 mcg by mouth daily before breakfast. 7 am, Disp: , Rfl:     melatonin 10 mg capsule, Take 10 mg by mouth nightly. 9 pm, Disp: , Rfl:     methenamine (HIPREX) 1 gram tablet, Take 1 g by mouth daily before lunch. 11 am, Disp: , Rfl:     metoprolol succinate XL (TOPROL-XL) 25 mg 24 hr tablet, Take 12.5 mg by mouth daily with dinner. 5 pm, Disp: , Rfl:     omeprazole (PriLOSEC) 20 mg capsule, Take 20 mg by mouth daily. 11 am, Disp: , Rfl:     potassium chloride (KLOR-CON M) 20 mEq CR tablet, Take 10 mEq by mouth 2 (two) times a day. 9 am and 5 pm, Disp: , Rfl:     tamsulosin (FLOMAX) 0.4 mg capsule, Take 0.4 mg by mouth every morning. 9 am, Disp: , Rfl:     thiamine HCl (VITAMIN B1) 100 mg tablet, Take 100 mg by mouth every morning. 9 am, Disp: , Rfl:     torsemide (DEMADEX) 10 mg tablet, Take 10 mg by mouth every morning. 9 am, Disp: , Rfl:   [facility], [facility] AWARxE: Query reviewed and no concerns    Opioid Risk Tool - Revised        \"A score of 2 or lower indicates low risk for future opioid use disorder; a score of >/= 3 indicates high risk for opioid use disorder\"    Physical Exam  Constitutional:       General: He is not in acute distress.     Appearance: He is cachectic. He is ill-appearing.   HENT:      Mouth/Throat:      Mouth: Mucous membranes are moist.      Pharynx: Oropharynx is clear.   Cardiovascular:      Rate and Rhythm: Normal rate. Rhythm irregular.      Pulses:           Radial pulses are 1+ on the right side and 1+ on the left side.        Dorsalis pedis pulses are 1+ on the right side and 1+ on the left side.   Pulmonary:      Breath sounds: Examination of the right-upper field reveals decreased breath sounds. Examination of the left-upper field reveals decreased breath sounds. " Examination of the right-middle field reveals decreased breath sounds. Examination of the right-lower field reveals decreased breath sounds. Decreased breath sounds present. No wheezing, rhonchi or rales.      Comments: Breath sounds absent in LLL  Abdominal:      General: The ostomy site is clean. Bowel sounds are normal.      Palpations: Abdomen is soft.      Tenderness: There is no abdominal tenderness.       Genitourinary:     Comments: Will catheter draining clear, yellow urine  Musculoskeletal:      Right lower leg: Edema present.      Left lower leg: Edema present.      Comments: Debilitated   Skin:     Capillary Refill: Capillary refill takes 2 to 3 seconds.      Coloration: Skin is pale.      Findings: No wound.   Neurological:      Mental Status: He is alert.   Psychiatric:         Attention and Perception: He perceives auditory and visual hallucinations.         Mood and Affect: Mood is depressed.         Speech: Speech is tangential.         Behavior: Behavior is cooperative.         Thought Content: Thought content is delusional.         Cognition and Memory: Cognition is impaired.        Palliative Assessment    Palliative Performance Scale: 40%    Mid Upper Arm Circumference (MUAC):    ESASr: Pain 0 Tiredness 2 Drowsiness 0 Nausea 0 Lack of Appetite 0 Shortness of Breath 0 Depression 0 Anxiety 0 Best Well Being 7  Palliative Disease State: Deteriorating despite treatments, At high risk for hospitalization, and At high risk for emergency department visits  Patient Prognostic Awareness: Demonstrates limited understanding of disease process, Demonstrates limited understanding of prognosis, and Demonstrates limited understanding of resuscitation status  Palliative Care Risk Stratification: Level Three- High risk.  Requires highest level of intervention.  Not likely to return to usual care    Goals of Care  Advance Directives Status:  Not Received  Advance Directives:    Document type(s)  Son to provide  at future visit  Healthcare Proxy Name, Contact Information, and Relationship Son, Marko Elena  Goals of Care Discussion: Yes  Change in medical orders resulting from advance care planning discussions: No change in medical orders  Code status  Hospital record indicates DNR/DNI but patient does not yet have a POLST uploaded into system. He deferred completing the document at this initial visit      Advance Care Planning    Palliative Home Based Care Advance Care Planning Note      Patient Name: Samy Elena                                                                                 Patient MRN: [id number]  Discussion Date: 02/25/25   Discussion Participants: patient, son, and caregiver   Start time: 12:50 PM  End time: 1:10 PM  Patients current medical state including medical necessity for ACP discussion:    Patient with poor prognosis measured in months or weeks rather than years, Patient with frequent rehospitalizations, Patient with frequent visits to the emergency department, and Patient at risk for overmedicalized death  Todays participants wished to discuss advance care planning. The patient was present for the conversation and able to participate. The following summarizes our discussion.     Patient asked about hospice services. Reviewed hospice benefit and explained eligibility process. Son confirmed he is patient's medical POA. Patient states he is a DNR code status but was not yet ready to complete documentation. Reviewed POLST and POA documentation. Son agreed to review information and will complete with father.    During our visit today, we discussed:     Health Care Agent/Surrogate Decision Maker   Patient identifies their son, Marko Elena, as their health care agent if they are unable to make their own medical decisions.    Treatment Decisions/ types of medical care preferred by the patient  Agreeable with return to the hospital for limited additional interventions    Patient  Capacity  Patient has unreliable capacity to make complex medical decisions    Attestation Statement:  I have spent a total of 20 minutes in face-to-face discussion of patient advance care planning with the patient and/or surrogate decision makers.  No active management of the patient’s problem(s) was undertaken during the time period reported      HEIDI Machado  2/25/2025 10:13 PM          Assessment/Plan       Diagnoses and all orders for this visit:    Palliative care by specialist (Primary)  Assessment & Plan:  Code Status: DNR  Patient questionable capacity to make medical decisions due to intermittent confusion and hallucinations  POA: Son, Marko Elena  Advanced Directives: To be completed at next visit  Treatment decisions/Goals of Care: Agreeable to return to the hospital as needed  Family Support: Patient residents at home with live-in caregiver, Melinda. Three local sons are active in patient's care    Spiritual Support: Rastafari. Receives weekly Eucharistic  visits from local [facility]  Next Palliative Follow Up Visit: 1 week      Acute on chronic systolic (congestive) heart failure (CMS/HCC)  Assessment & Plan:  Follows with PCP  Maintained on ASA, metoprolol, potassium chloride, and torsemide  Elevate legs  Monitor weights      Gastrointestinal hemorrhage associated with intestinal diverticulosis  Assessment & Plan:  Hgl 8.6, Hct 28.1  Patient will seek transfusion therapy  Increased fluids as tolerated  Monitor for blood in ostomy / perry bags and for rectal bleeding      CKD (chronic kidney disease) stage 4, GFR 15-29 ml/min (CMS/HCC)  Assessment & Plan:  Improved  Current renal values indicate CKD Stage 3b - BUN 26, Cr 1.5, eGFR 43.1  Minimize added salt  Trend labs        Depression, unspecified depression type  Assessment & Plan:  Managed by PCP  Situational. 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 No evidence of infection  Caregiver changes bag as needed  Monitor for blood clots/bleeding      Dysphagia, unspecified type  Assessment & Plan:  Dysphonia  Observed coughing while eating ice cream  Complains of thickened secretions  Remain upright for all meals  Monitor for aspiration      Debility  Assessment & Plan:  Debility likely multifactorial in the setting of advanced cardiac disease and hallucinations  Frail  Albumin: 2.6  Generalized sarcopenia   PPS: 40%.   ADLs: Requires the assistance of one to two for transfers and bed mobility. Full assistance of one for bathing, toileting, and dressing. Uses wheelchair for locomotion  Maintain fall risk precautions  Patient remains high risk for further deterioration and functional decline

## 2025-02-25 NOTE — H&P (VIEW-ONLY)
Palliative Home Based Care Initial Visit    Subjective     Patient ID: Samy Elena is a 89 y.o. male.  MRN: [id number]  Date/ Time Visit Made:  Referring Physician: Mariaa Gonzales Md  [address] [facility] [address]  [place] [id number]  Primary Care Physician:Zachery Hernandez MD       Consent obtained from patient and all parties present in the room? yes     I have obtained the consent of everyone present in the room to make an audio recording of this visit to assist me in documenting the encounter in the EMR.  [place]    The start time of this Face to Face visit was 11:30 AM.  The end time of this Face to Face visit was 12:49 PM.      I spent  on this date of service performing the following activities: obtaining history, performing examination, documenting, preparing for visit, obtaining / reviewing records, providing counseling and education, and independently reviewing study/studies.  This time excludes time spent discussing ACP.    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  History of Present Illness    Patient received seating in wheelchair accompanied by live-in caregiver, Melinda. Melinda has resided with patient in his home for the past two years. Son, Marko, later joined the conversation in person. Reports experiencing intermittent episodes of depression, particularly in the mornings, which improve by lunch. Reports occasional episodes of shortness of breath.      Caregiver and son report patient experiences auditory and visual hallucinations,  especially overnight, for the past two years. He often awakens overnight and can be combative. Mirtazapine was ineffective. Patient exhibited episode of agitation during visit. He was otherwise very pleasant and conversant.     Patient is a . He has four adult sons with whom he remains close. Three sons are within driving distance, while one son [place]. 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 He has a PhD and has been published. 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  .   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)     Coronary artery disease     Disease of thyroid gland     Will catheter in place     6/25 not at present    GI (gastrointestinal bleed)     Hypertension     Myocardial infarction (CMS/HCC)     Orthostatic hypotension     TIA (transient ischemic attack)      Past Surgical History:    Past Surgical History   Procedure Laterality Date    cataract extraction right eye with implant and limbal relaxing incision Right 7/18/2024    Performed by Hayder Moses MD at  OR [facility]    Cataract extraction w/ intraocular lens implant Left     Cataract extraction with LRI and anterior vitrectomy left eye Left 11/16/2023    Performed by Hayder Moses MD at  OR [facility]    Cholecystectomy      Colostomy      COLOSTOMY LAPAROSCOPIC N/A 8/3/2022    [facility] [place] [facility]    Coronary artery bypass graft      DIAGNOSTIC FLEXIBLE SIGMOIDOSCOPY WITH COLLECTION OF SPECIMEN BY WASHING N/A 11/27/2024    [facility]formed by [facility]comb [place] [facility]    DIAGNOSTIC FLEXIBLE SIGMOIDOSCOPY WITH COLLECTION OF SPECIMEN BY WASHING N/A 10/18/2024    Performed by Jessica  [place] [facility]    DIAGNOSTIC UPPER GASTROINTESTINAL ENDOSCOPY WITH COLLECTION OF SPECIMEN BY WASHING N/A 11/27/2024    Performed by David Holcomb [facility] at [facility]    FLEXIBLE SIGMOIDOSCOPY WITH BIOPSY N/A 10/23/2024    Performed by David Holcomb MD at [facility]    INCISION AND DRAINAGE WITH DEBRIDMENT OF BUTTOCK, AND PERINEUM N/A 8/2/2022    Performed by Mat Bryant [facility] at [facility]    Joint replacement Left     Knee    RIGHT HEART CATH N/A 8/4/2022    Performed by Cristal Lacey [facility]    Thyroidectomy      WASHOUT OF PERINEAL WOUND, COLONIC LAVAGE N/A 8/3/2022    Performed by Mat Bryant [facility] at [facility]     Family History:  family history is not on file.  Social History:    Social History     Socioeconomic History    Marital status:    Tobacco Use    Smoking status: Never    Smokeless tobacco: Never   Vaping Use    Vaping status: Never Used   Substance and Sexual Activity    Alcohol use: Not Currently     Comment: social    Drug use: Never    Sexual activity: Defer     Social Drivers of Health     Financial Resource Strain: Low Risk  (8/4/2023)    Overall Financial Resource Strain (CARDIA)     Difficulty of Paying Living Expenses: Not hard at all   Food Insecurity: No Food Insecurity (2/16/2025)    Hunger Vital Sign     Worried About Running Out of Food in the Last Year: Never true     Ran Out of Food in the Last Year: Never true   Transportation Needs: No Transportation Needs (2/19/2025)    PRAPARE - Transportation     Lack of Transportation (Medical): No     Lack of Transportation (Non-Medical): No   Housing Stability: Low Risk  (2/19/2025)    Housing Stability Vital Sign     Unable to Pay for Housing in the Last Year: No     Number of Times Moved in the Last Year: 0     Homeless in the Last Year: No       Sabianist:    Spiritual Assessment: Karoline and Belief: Church. Retired Deacon. [place]ce[place] communion [place] from [place] .    Service:   Environmental Assessment:  Home environment safe and suitable for care  Caregiver [place]: No burden of care issues identified and Caregiver willing and able to safely provide needed care  Data Review  Lab Studies: Pertinent radiology and labs reviewed  Labs last six months:    Admission on 02/16/2025, Discharged on 02/19/2025   Component Date Value Ref Range Status    WBC 02/16/2025 4.69  3.80 - 10.50 K/uL Final    RBC 02/16/2025 2.78 (L)  4.50 - 5.80 M/uL Final    Hemoglobin 02/16/2025 8.6 (L)  13.7 - 17.5 g/dL Final    Hematocrit 02/16/2025 28.1 (L)  40.1 - 51.0 % Final    MCV 02/16/2025 101.1 (H)  83.0 - 98.0 fL Final    MCH 02/16/2025 30.9  28.0 - 33.2 pg Final    MCHC 02/16/2025 30.6 (L)  32.2 - 36.5 g/dL Final    RDW 02/16/2025 22.2 (H)  11.6 - 14.4 % Final    Platelets 02/16/2025 94 (L)  150 - 350 K/uL Final    MPV 02/16/2025 9.8  9.4 - 12.4 fL Final    Differential Type 02/16/2025 Auto   Final    nRBC 02/16/2025 0.0  <=0.0 % Final    Immature Granulocytes 02/16/2025 0.4  % Final    Neutrophils 02/16/2025 73.4  % Final    Lymphocytes 02/16/2025 11.1  % Final    Monocytes 02/16/2025 11.9  % Final    Eosinophils 02/16/2025 2.6  % Final    Basophils 02/16/2025 0.6  % Final    Immature Granulocytes, Absolute 02/16/2025 0.02  0.00 - 0.08 K/uL Final    Neutrophils, Absolute 02/16/2025 3.44  1.70 - 7.00 K/uL Final    Lymphocytes, Absolute 02/16/2025 0.52 (L)  1.20 - 3.50 K/uL Final    Monocytes, Absolute 02/16/2025 0.56  0.30 - 1.00 K/uL Final    Eosinophils, Absolute 02/16/2025 0.12  0.04 - 0.54 K/uL Final    Basophils, Absolute 02/16/2025 0.03  0.01 - 0.10 K/uL Final    Platelet Estimate 02/16/2025 Decreased (51,000-149,000)   Final    Anisocytosis 02/16/2025 1+   Final    Macrocytes 02/16/2025 1+   Final    Ovalocytes 02/16/2025 Occasional   Final    Christianne Cells 02/16/2025 Occasional   Final    Sodium 02/16/2025 141  136 - 145 mEQ/L Final    Potassium 02/16/2025 4.3  3.5 - 5.1 mEQ/L Final    Results  obtained on plasma. Plasma Potassium values may be up to 0.4 mEQ/L less than serum values. The differences may be greater for patients with high platelet or white cell counts.    Chloride 02/16/2025 111 (H)  98 - 107 mEQ/L Final    CO2 02/16/2025 25  21 - 31 mEQ/L Final    BUN 02/16/2025 26 (H)  7 - 25 mg/dL Final    Creatinine 02/16/2025 1.5 (H)  0.7 - 1.3 mg/dL Final    Glucose 02/16/2025 100 (H)  70 - 99 mg/dL Final    Calcium 02/16/2025 7.5 (L)  8.6 - 10.3 mg/dL Final    AST (SGOT) 02/16/2025 28  13 - 39 IU/L Final    ALT (SGPT) 02/16/2025 6 (L)  7 - 52 IU/L Final    Alkaline Phosphatase 02/16/2025 83  34 - 125 IU/L Final    Total Protein 02/16/2025 6.5  6.0 - 8.2 g/dL Final    Test performed on plasma which typically contains approximately 0.4 g/dL more protein than serum.    Albumin 02/16/2025 2.6 (L)  3.5 - 5.7 g/dL Final    Bilirubin, Total 02/16/2025 0.6  0.3 - 1.2 mg/dL Final    eGFR 02/16/2025 43.1 (L)  >=60.0 mL/min/1.73m*2 Final    Calculation based on the Chronic Kidney Disease Epidemiology Collaboration (CKD-EPI) equation refit without adjustment for race.    Anion Gap 02/16/2025 5  3 - 15 mEQ/L Final    Ventricular rate 02/16/2025 59   Final    QRS duration 02/16/2025 170   Final    QT Interval 02/16/2025 516   Final    QTC Calculation(Bazett) 02/16/2025 510   Final    R Axis 02/16/2025 95   Final    T Wave Axis 02/16/2025 117   Final    Color, Urine 02/16/2025 Yellow  Yellow, Colorless Final    Clarity, Urine 02/16/2025 Clear  Clear Final    Specific Gravity, Urine 02/16/2025 1.015  1.005 - 1.030 Final    pH, Urine 02/16/2025 6.0  4.5 - 8.0 Final    Leukocyte Esterase 02/16/2025 +1 (A)  Negative Final    Nitrite, Urine 02/16/2025 Negative  Negative Final    Protein, Urine 02/16/2025 Negative  Negative Final    Glucose, Urine 02/16/2025 Negative  Negative mg/dL Final    Ketones, Urine 02/16/2025 Negative  Negative mg/dL Final    Urobilinogen, Urine 02/16/2025 0.2  <2.0 EU/dL EU/dL Final    Bilirubin,  Urine 02/16/2025 Negative  Negative mg/dL Final    Blood, Urine 02/16/2025 Trace (A)  Negative Final    The sensitivity of the occult blood test is equivalent to approximately 4 intact RBC/HPF.    Lactate 02/16/2025 1.5  0.4 - 2.0 mmol/L Final    High Sens Troponin I 02/16/2025 19.7 (H)  <15.0 pg/mL Final    RBC, Urine 02/16/2025 5 TO 9 (A)  0 TO 4 /HPF Final    WBC, Urine 02/16/2025 10 TO 20 (A)  0 TO 3 /HPF Final    Squamous Epithelial 02/16/2025 None Seen  None Seen /hpf Final    Hyaline Cast 02/16/2025 None Seen  None Seen /lpf Final    Bacteria, Urine 02/16/2025 None Seen  None Seen /HPF Final    Transitional Epithelial 02/16/2025 Rare (A)  None Seen /hpf Final    Renal Epithelial 02/16/2025 Rare (A)  None Seen /hpf Final    High Sens Troponin I 02/16/2025 17.5 (H)  <15.0 pg/mL Final    Urine Culture 02/16/2025 No Growth (Limit of detection 1000 cfu/mL)   Final    SARS-CoV-2 (COVID-19) 02/16/2025 Negative  Negative Final    Influenza A 02/16/2025 Negative  Negative Final    Influenza B 02/16/2025 Negative  Negative Final    Respiratory Syncytial Virus 02/16/2025 Negative  Negative Final    pH, Venous 02/16/2025 7.36  7.32 - 7.42 Final    pCO2, Venous 02/16/2025 45  41 - 51 mm Hg Final    pO2, Venous 02/16/2025 <20 (L)  25 - 40 mm Hg Final    PO2 values <30 have not been validated by the laboratory. Suggest clinical correlation.    HCO3, Venous 02/16/2025 23.4  21.0 - 28.0 mEQ/L Final    Base Excess, Venous 02/16/2025 -0.2  mEQ/L Final    TCO2, Venous 02/16/2025 26.8  22.0 - 32.0 mEQ/L Final    Lactate 02/16/2025 1.6  0.4 - 2.0 mmol/L Final    Source Of Oxygen 02/16/2025 ra   Final    FIO2 02/16/2025 21   Final    Culture 02/16/2025 No growth at 96 hours   Final    Culture 02/16/2025 No growth at 96 hours   Final    Legionella pneumophila Serogp 1 Ur* 02/16/2025 Negative  Negative Final    MRSA DNA, Nares 02/16/2025 Negative  Negative Final    No methicillin resistant Staphylococcus aureus (MRSA) detected.     Iron 02/16/2025 73  35 - 150 ug/dL Final    TIBC 02/16/2025 265 (L)  270 - 460 ug/dL Final    UIBC 02/16/2025 192  180 - 360 ug/dL Final    Iron Saturation 02/16/2025 28  15 - 45 % Final    Ferritin 02/16/2025 93  24 - 250 ng/mL Final    Culture 02/17/2025 No growth at 96 hours   Final    Gram Stain Result 02/17/2025 1+ WBC   Final    Gram Stain Result 02/17/2025 No organisms seen   Final    LD, Fluid 02/17/2025 67  U/L Final    Reference range has not been established for this body fluid type. Suggest clinical correlation and literature review for clinical significance.    Case Report 02/17/2025    Final                    Value:Non-gynecologic Cytology                          Case: [id number]                                   Authorizing Provider:  Imelda Altamirano,   Collected:           02/17/2025 1010                                     MD                                                                           Ordering Location:     [facility]    Received:            02/17/2025 1022                                     Emergency Department                                                         Pathologist:           Lazaro Barkley DO                                                         Specimen:    Pleural Fluid, Left                                                                        INTERPRETATION 02/17/2025    Final                    Value:A. Pleural Fluid, Left, Fluids/Brushings, Thinprep and Block:     No malignant cells identified.        **ATTENTION PATIENTS**  **The findings in this report have been made available for review potentially before your provider has had a chance to review and discuss the results with you.  Please allow time for your provider to review your results.  If you have any questions or concerns about these results, please contact the healthcare provider who ordered the test.**        Gross Description 02/17/2025    Final                     Value:A.  900 ml cloudy, hilario fluid received fresh  Slides: 1 ThinPrep, 1 Cell Block       Protein, Fluid 02/17/2025 <3.0  g/dL Final    Reference range has not been established for this body fluid type. Suggest clinical correlation and literature review for clinical significance.    Glucose, Fluid 02/17/2025 124.0  mg/dL Final    Reference range has not been established for this body fluid type. Suggest clinical correlation and literature review for clinical significance.    pH, Fluid 02/17/2025 7.5   Final    Fungus Stain 02/17/2025 No fungal elements seen   Final    Fungal Culture 02/17/2025 No Fungus Isolated to Date   Preliminary    Fluid Type 02/17/2025 Pleural Fluid   Final    Fluid Source 02/17/2025 Pleural Fluid, Left   Final    Color. Fluid 02/17/2025 Light Yellow  - Final    Appearance, Fluid 02/17/2025 Hazy  - Final    WBC, Fluid 02/17/2025 256 (H)  0 - 200 cells/cu mm Final    RBC, Fluid 02/17/2025 <10,000  0 - 10,000 cells/cu mm Final    AFB Stain 02/17/2025 No acid fast bacilli seen  No acid fast bacilli seen Final    AFB Culture 02/17/2025 No Acid Fast Bacilli Isolated to Date   Preliminary    Lymphocytes 02/17/2025 88  % Final    Mononuclear cells 02/17/2025 12  % Final    includes Monocytes, Macrophages, & Mesothelial Cells    LD 02/16/2025 164  98 - 271 IU/L Final   No results displayed because visit has over 200 results.      No results displayed because visit has over 200 results.      Appointment on 12/17/2024   Component Date Value Ref Range Status    Sodium 12/17/2024 139  136 - 145 mEQ/L Final    Potassium 12/17/2024 4.3  3.5 - 5.1 mEQ/L Final    Chloride 12/17/2024 109 (H)  98 - 107 mEQ/L Final    CO2 12/17/2024 26  21 - 31 mEQ/L Final    BUN 12/17/2024 28 (H)  7 - 25 mg/dL Final    Creatinine 12/17/2024 1.4 (H)  0.7 - 1.3 mg/dL Final    Glucose 12/17/2024 164 (H)  70 - 99 mg/dL Final    Calcium 12/17/2024 8.4 (L)  8.6 - 10.3 mg/dL Final    eGFR 12/17/2024 46.9 (L)  >=60.0 mL/min/1.73m*2  Final    Calculation based on the Chronic Kidney Disease Epidemiology Collaboration (CKD-EPI) equation refit without adjustment for race.    Anion Gap 12/17/2024 4  3 - 15 mEQ/L Final    WBC 12/17/2024 4.39  3.80 - 10.50 K/uL Final    RBC 12/17/2024 2.56 (L)  4.50 - 5.80 M/uL Final    Hemoglobin 12/17/2024 8.5 (L)  13.7 - 17.5 g/dL Final    Hematocrit 12/17/2024 27.6 (L)  40.1 - 51.0 % Final    MCV 12/17/2024 107.8 (H)  83.0 - 98.0 fL Final    MCH 12/17/2024 33.2  28.0 - 33.2 pg Final    MCHC 12/17/2024 30.8 (L)  32.2 - 36.5 g/dL Final    RDW 12/17/2024 19.9 (H)  11.6 - 14.4 % Final    Platelets 12/17/2024 98 (L)  150 - 350 K/uL Final    ALL RESULTS HAVE BEEN RECHECKED    MPV 12/17/2024 10.8  9.4 - 12.4 fL Final    Magnesium 12/17/2024 2.0  1.8 - 2.5 mg/dL Final   Admission on 11/26/2024, Discharged on 11/28/2024   Component Date Value Ref Range Status    WBC 11/26/2024 4.79  3.80 - 10.50 K/uL Final    RBC 11/26/2024 2.47 (L)  4.50 - 5.80 M/uL Final    Hemoglobin 11/26/2024 8.1 (L)  13.7 - 17.5 g/dL Final    Hematocrit 11/26/2024 26.1 (L)  40.1 - 51.0 % Final    MCV 11/26/2024 105.7 (H)  83.0 - 98.0 fL Final    MCH 11/26/2024 32.8  28.0 - 33.2 pg Final    MCHC 11/26/2024 31.0 (L)  32.2 - 36.5 g/dL Final    RDW 11/26/2024 19.2 (H)  11.6 - 14.4 % Final    Platelets 11/26/2024 103 (L)  150 - 350 K/uL Final    MPV 11/26/2024 10.4  9.4 - 12.4 fL Final    Differential Type 11/26/2024 Auto   Final    nRBC 11/26/2024 0.0  <=0.0 % Final    Immature Granulocytes 11/26/2024 0.4  % Final    Neutrophils 11/26/2024 73.5  % Final    Lymphocytes 11/26/2024 11.5  % Final    Monocytes 11/26/2024 11.3  % Final    Eosinophils 11/26/2024 2.5  % Final    Basophils 11/26/2024 0.8  % Final    Immature Granulocytes, Absolute 11/26/2024 0.02  0.00 - 0.08 K/uL Final    Neutrophils, Absolute 11/26/2024 3.52  1.70 - 7.00 K/uL Final    Lymphocytes, Absolute 11/26/2024 0.55 (L)  1.20 - 3.50 K/uL Final    Monocytes, Absolute 11/26/2024 0.54  0.30  - 1.00 K/uL Final    Eosinophils, Absolute 11/26/2024 0.12  0.04 - 0.54 K/uL Final    Basophils, Absolute 11/26/2024 0.04  0.01 - 0.10 K/uL Final    Sodium 11/26/2024 140  136 - 145 mEQ/L Final    Potassium 11/26/2024 4.1  3.5 - 5.1 mEQ/L Final    Results obtained on plasma. Plasma Potassium values may be up to 0.4 mEQ/L less than serum values. The differences may be greater for patients with high platelet or white cell counts.    Chloride 11/26/2024 108 (H)  98 - 107 mEQ/L Final    CO2 11/26/2024 24  21 - 31 mEQ/L Final    BUN 11/26/2024 26 (H)  7 - 25 mg/dL Final    Creatinine 11/26/2024 1.6 (H)  0.7 - 1.3 mg/dL Final    Glucose 11/26/2024 135 (H)  70 - 99 mg/dL Final    Calcium 11/26/2024 8.4 (L)  8.6 - 10.3 mg/dL Final    AST (SGOT) 11/26/2024 28  13 - 39 IU/L Final    ALT (SGPT) 11/26/2024 5 (L)  7 - 52 IU/L Final    Alkaline Phosphatase 11/26/2024 97  34 - 125 IU/L Final    Total Protein 11/26/2024 7.4  6.0 - 8.2 g/dL Final    Test performed on plasma which typically contains approximately 0.4 g/dL more protein than serum.    Albumin 11/26/2024 2.9 (L)  3.5 - 5.7 g/dL Final    Bilirubin, Total 11/26/2024 0.6  0.3 - 1.2 mg/dL Final    eGFR 11/26/2024 39.9 (L)  >=60.0 mL/min/1.73m*2 Final    Calculation based on the Chronic Kidney Disease Epidemiology Collaboration (CKD-EPI) equation refit without adjustment for race.    Anion Gap 11/26/2024 8  3 - 15 mEQ/L Final    Specimen Expiration 11/26/2024 11/29/2024   Final    Antibody Screen 11/26/2024 Negative   Final    ABO 11/26/2024 O   Final    Rh Factor 11/26/2024 Positive   Final    History Check 11/26/2024 Previous type on file   Final    Lactate 11/26/2024 2.1 (H)  0.4 - 2.0 mmol/L Final    Ventricular rate 11/26/2024 58   Final    QRS duration 11/26/2024 170   Final    QT Interval 11/26/2024 550   Final    QTC Calculation(Bazett) 11/26/2024 539   Final    R Axis 11/26/2024 66   Final    T Wave Axis 11/26/2024 -13   Final    Product Code 11/26/2024 X5338Y54    Final    Unit ID 11/26/2024 [id number]   Final    Unit ABO 11/26/2024 O   Final    Unit RH 11/26/2024 Positive   Final    Crossmatch 11/26/2024 Compatible   Final    Product Status 11/26/2024 PT   Final    Unit Expiration Date/Time 11/26/2024 [id number]   Final    ISBT Code 11/26/2024 5100   Final    Lactate 11/27/2024 1.9  0.4 - 2.0 mmol/L Final    Sodium 11/27/2024 139  136 - 145 mEQ/L Final    Potassium 11/27/2024 4.2  3.5 - 5.1 mEQ/L Final    Results obtained on plasma. Plasma Potassium values may be up to 0.4 mEQ/L less than serum values. The differences may be greater for patients with high platelet or white cell counts.    Chloride 11/27/2024 109 (H)  98 - 107 mEQ/L Final    CO2 11/27/2024 22  21 - 31 mEQ/L Final    BUN 11/27/2024 27 (H)  7 - 25 mg/dL Final    Creatinine 11/27/2024 1.5 (H)  0.7 - 1.3 mg/dL Final    Glucose 11/27/2024 111 (H)  70 - 99 mg/dL Final    Calcium 11/27/2024 8.2 (L)  8.6 - 10.3 mg/dL Final    eGFR 11/27/2024 43.1 (L)  >=60.0 mL/min/1.73m*2 Final    Calculation based on the Chronic Kidney Disease Epidemiology Collaboration (CKD-EPI) equation refit without adjustment for race.    Anion Gap 11/27/2024 8  3 - 15 mEQ/L Final    Magnesium 11/27/2024 1.9  1.8 - 2.5 mg/dL Final    WBC 11/27/2024 4.33  3.80 - 10.50 K/uL Final    RBC 11/27/2024 2.40 (L)  4.50 - 5.80 M/uL Final    Hemoglobin 11/27/2024 7.9 (L)  13.7 - 17.5 g/dL Final    Hematocrit 11/27/2024 24.5 (L)  40.1 - 51.0 % Final    MCV 11/27/2024 102.1 (H)  83.0 - 98.0 fL Final    MCH 11/27/2024 32.9  28.0 - 33.2 pg Final    MCHC 11/27/2024 32.2  32.2 - 36.5 g/dL Final    RDW 11/27/2024 19.8 (H)  11.6 - 14.4 % Final    Platelets 11/27/2024 89 (L)  150 - 350 K/uL Final    CONSISTENT WITH PREVIOUS RESULTSSPECIMEN QUALITY CHECKED    MPV 11/27/2024 10.9  9.4 - 12.4 fL Final    Product Code 11/27/2024 [id number]   Final    Unit ID 11/27/2024 [id number]   Final    Unit ABO 11/27/2024 O   Final    Unit RH 11/27/2024 Negative   Final     Crossmatch 11/27/2024 Compatible   Final    Product Status 11/27/2024 PT   Final    Unit Expiration Date/Time 11/27/2024 [id number]   Final    ISBT Code 11/27/2024 9500   Final    contacted karley at 0643    WBC 11/27/2024 4.19  3.80 - 10.50 K/uL Final    RBC 11/27/2024 2.73 (L)  4.50 - 5.80 M/uL Final    Hemoglobin 11/27/2024 8.7 (L)  13.7 - 17.5 g/dL Final    Hematocrit 11/27/2024 27.4 (L)  40.1 - 51.0 % Final    MCV 11/27/2024 100.4 (H)  83.0 - 98.0 fL Final    MCH 11/27/2024 31.9  28.0 - 33.2 pg Final    MCHC 11/27/2024 31.8 (L)  32.2 - 36.5 g/dL Final    RDW 11/27/2024 21.2 (H)  11.6 - 14.4 % Final    Platelets 11/27/2024 82 (L)  150 - 350 K/uL Final    CONFIRMED WITH SMEAR ESTIMATE    MPV 11/27/2024 10.2  9.4 - 12.4 fL Final    WBC 11/27/2024 4.37  3.80 - 10.50 K/uL Final    RBC 11/27/2024 2.77 (L)  4.50 - 5.80 M/uL Final    Hemoglobin 11/27/2024 8.9 (L)  13.7 - 17.5 g/dL Final    Hematocrit 11/27/2024 28.0 (L)  40.1 - 51.0 % Final    MCV 11/27/2024 101.1 (H)  83.0 - 98.0 fL Final    MCH 11/27/2024 32.1  28.0 - 33.2 pg Final    MCHC 11/27/2024 31.8 (L)  32.2 - 36.5 g/dL Final    RDW 11/27/2024 21.2 (H)  11.6 - 14.4 % Final    Platelets 11/27/2024 85 (L)  150 - 350 K/uL Final    CONSISTENT WITH PREVIOUS RESULTS    MPV 11/27/2024 10.4  9.4 - 12.4 fL Final    Vitamin B-12 11/28/2024 769  180 - 914 pg/mL Final    Folate 11/28/2024 12.3  >=5.8 ng/mL Final    WHO has determined that folate concentrations less than 4 ng/mL should be considered deficient.    Sodium 11/28/2024 140  136 - 145 mEQ/L Final    Potassium 11/28/2024 4.0  3.5 - 5.1 mEQ/L Final    Chloride 11/28/2024 109 (H)  98 - 107 mEQ/L Final    CO2 11/28/2024 23  21 - 31 mEQ/L Final    BUN 11/28/2024 26 (H)  7 - 25 mg/dL Final    Creatinine 11/28/2024 1.4 (H)  0.7 - 1.3 mg/dL Final    Glucose 11/28/2024 88  70 - 99 mg/dL Final    Calcium 11/28/2024 7.9 (L)  8.6 - 10.3 mg/dL Final    eGFR 11/28/2024 46.9 (L)  >=60.0 mL/min/1.73m*2 Final    Calculation  based on the Chronic Kidney Disease Epidemiology Collaboration (CKD-EPI) equation refit without adjustment for race.    Anion Gap 11/28/2024 8  3 - 15 mEQ/L Final    Magnesium 11/28/2024 1.8  1.8 - 2.5 mg/dL Final    WBC 11/28/2024 3.87  3.80 - 10.50 K/uL Final    RBC 11/28/2024 2.73 (L)  4.50 - 5.80 M/uL Final    Hemoglobin 11/28/2024 8.8 (L)  13.7 - 17.5 g/dL Final    Hematocrit 11/28/2024 27.6 (L)  40.1 - 51.0 % Final    MCV 11/28/2024 101.1 (H)  83.0 - 98.0 fL Final    MCH 11/28/2024 32.2  28.0 - 33.2 pg Final    MCHC 11/28/2024 31.9 (L)  32.2 - 36.5 g/dL Final    RDW 11/28/2024 21.1 (H)  11.6 - 14.4 % Final    Platelets 11/28/2024 81 (L)  150 - 350 K/uL Final    CONSISTENT WITH PREVIOUS RESULTS    MPV 11/28/2024 11.2  9.4 - 12.4 fL Final   Orders Only on 11/26/2024   Component Date Value Ref Range Status    Product Code 11/26/2024 [id number]   Final    Unit ID 11/26/2024 [id number]   Final    Unit ABO 11/26/2024 O   Final    Unit RH 11/26/2024 Negative   Final    Product Status 11/26/2024 RE   Final    Unit Expiration Date/Time 11/26/2024 202412102359   Final    ISBT Code 11/26/2024 9500   Final   No results displayed because visit has over 200 results.      Lab Requisition on 10/03/2024   Component Date Value Ref Range Status    Sodium 10/03/2024 140  136 - 145 mEQ/L Final    Potassium 10/03/2024 4.4  3.5 - 5.1 mEQ/L Final    Chloride 10/03/2024 107  98 - 107 mEQ/L Final    CO2 10/03/2024 25  21 - 31 mEQ/L Final    BUN 10/03/2024 29 (H)  7 - 25 mg/dL Final    Creatinine 10/03/2024 1.6 (H)  0.7 - 1.3 mg/dL Final    Glucose 10/03/2024 111 (H)  70 - 99 mg/dL Final    Calcium 10/03/2024 8.6  8.6 - 10.3 mg/dL Final    AST (SGOT) 10/03/2024 39  13 - 39 IU/L Final    ALT (SGPT) 10/03/2024 7  7 - 52 IU/L Final    Alkaline Phosphatase 10/03/2024 116  34 - 125 IU/L Final    Total Protein 10/03/2024 8.1  6.0 - 8.2 g/dL Final    Albumin 10/03/2024 3.2 (L)  3.5 - 5.7 g/dL Final    Bilirubin, Total 10/03/2024 1.1  0.3 -  1.2 mg/dL Final    eGFR 10/03/2024 40.2 (L)  >=60.0 mL/min/1.73m*2 Final    Calculation based on the Chronic Kidney Disease Epidemiology Collaboration (CKD-EPI) equation refit without adjustment for race.    Anion Gap 10/03/2024 8  3 - 15 mEQ/L Final    WBC 10/03/2024 5.24  3.80 - 10.50 K/uL Final    RBC 10/03/2024 3.12 (L)  4.50 - 5.80 M/uL Final    Hemoglobin 10/03/2024 10.2 (L)  13.7 - 17.5 g/dL Final    Hematocrit 10/03/2024 33.1 (L)  40.1 - 51.0 % Final    MCV 10/03/2024 106.1 (H)  83.0 - 98.0 fL Final    MCH 10/03/2024 32.7  28.0 - 33.2 pg Final    MCHC 10/03/2024 30.8 (L)  32.2 - 36.5 g/dL Final    RDW 10/03/2024 19.2 (H)  11.6 - 14.4 % Final    Platelets 10/03/2024 148 (L)  150 - 350 K/uL Final    MPV 10/03/2024 10.4  9.4 - 12.4 fL Final    Differential Type 10/03/2024 Auto   Final    nRBC 10/03/2024 0.0  <=0.0 % Final    Immature Granulocytes 10/03/2024 0.2  % Final    Neutrophils 10/03/2024 73.7  % Final    Lymphocytes 10/03/2024 13.2  % Final    Monocytes 10/03/2024 9.0  % Final    Eosinophils 10/03/2024 3.1  % Final    Basophils 10/03/2024 0.8  % Final    Immature Granulocytes, Absolute 10/03/2024 0.01  0.00 - 0.08 K/uL Final    Neutrophils, Absolute 10/03/2024 3.87  1.70 - 7.00 K/uL Final    Lymphocytes, Absolute 10/03/2024 0.69 (L)  1.20 - 3.50 K/uL Final    Monocytes, Absolute 10/03/2024 0.47  0.30 - 1.00 K/uL Final    Eosinophils, Absolute 10/03/2024 0.16  0.04 - 0.54 K/uL Final    Basophils, Absolute 10/03/2024 0.04  0.01 - 0.10 K/uL Final    Magnesium 10/03/2024 2.2  1.8 - 2.5 mg/dL Final   No results displayed because visit has over 200 results.          Labs Reviewed: I have reviewed the patient's labs to the time of note.  No new clinical concern.  Radiology and Imaging Reviewed: I have independently reviewed the patient's pertinent imaging to the time of note and agree with the reported results.   Cardiac Studies Reviewed: I have independently reviewed the patient's pertinent cardiac studies to  the time of note and agree with the reported results.   Medicine Tests reviewed: No New Tests      Review of Systems   Constitutional:  Positive for fatigue.   Respiratory:  Positive for shortness of breath. Negative for chest tightness.    Cardiovascular:  Positive for leg swelling. Negative for chest pain.   Gastrointestinal:  Negative for anal bleeding, blood in stool, nausea and vomiting.   Genitourinary:  Negative for dysuria and hematuria.   Musculoskeletal:  Positive for myalgias.   Neurological:  Positive for speech difficulty and weakness.   Psychiatric/Behavioral:  Positive for confusion, hallucinations and sleep disturbance.      Objective     Vitals:    02/25/25 1340   BP: 104/60   BP Location: Right upper arm   Patient Position: Sitting   Pulse: 64   Resp: 16   Temp: 36.5 °C (97.7 °F)   TempSrc: Temporal   SpO2: 96%   Height: 1.829 m (6')     Body mass index is 21.43 kg/m².  Allergies:   Allergies   Allergen Reactions    Jardiance [Empagliflozin] Other (see comments)     denise's gangrene         Medications:   Current Outpatient Medications:     aspirin 81 mg enteric coated tablet, Take 81 mg by mouth daily. 9 am, Disp: , Rfl:     bimatoprost (LUMIGAN) 0.01 % ophthalmic drops, Administer 1 drop into the left eye nightly., Disp: , Rfl:     buPROPion XL (WELLBUTRIN XL) 150 mg 24 hr tablet, Take 150 mg by mouth daily., Disp: , Rfl:     calcium, as carbonate, (TUMS) 200 mg calcium (500 mg) chewable tablet, Take 1 tablet by mouth daily. 11 am, Disp: , Rfl:     carboxymethylcellulose (REFRESH PLUS) 0.5 % dropperette, Administer 1 drop into both eyes every2 (two) hours while awake., Disp: , Rfl:     cranberry extract 425 mg capsule, Take 425 mg by mouth daily. 5 pm, Disp: , Rfl:     cyanocobalamin (VITAMIN B12) 500 mcg tablet, Take 500 mcg by mouth every morning. 9 am, Disp: , Rfl:     famotidine (PEPCID) 10 mg tablet, Take 10 mg by mouth daily. 11 am, Disp: , Rfl:     ferrous sulfate 325 mg (65 mg iron)  "tablet, Take 65 mg by mouth daily with breakfast. 11 am, Disp: , Rfl:     finasteride (PROSCAR) 5 mg tablet, Take 5 mg by mouth daily. 9 am, Disp: , Rfl:     levothyroxine (SYNTHROID) 100 mcg tablet, Take 100 mcg by mouth daily before breakfast. 7 am, Disp: , Rfl:     melatonin 10 mg capsule, Take 10 mg by mouth nightly. 9 pm, Disp: , Rfl:     methenamine (HIPREX) 1 gram tablet, Take 1 g by mouth daily before lunch. 11 am, Disp: , Rfl:     metoprolol succinate XL (TOPROL-XL) 25 mg 24 hr tablet, Take 12.5 mg by mouth daily with dinner. 5 pm, Disp: , Rfl:     omeprazole (PriLOSEC) 20 mg capsule, Take 20 mg by mouth daily. 11 am, Disp: , Rfl:     potassium chloride (KLOR-CON M) 20 mEq CR tablet, Take 10 mEq by mouth 2 (two) times a day. 9 am and 5 pm, Disp: , Rfl:     tamsulosin (FLOMAX) 0.4 mg capsule, Take 0.4 mg by mouth every morning. 9 am, Disp: , Rfl:     thiamine HCl (VITAMIN B1) 100 mg tablet, Take 100 mg by mouth every morning. 9 am, Disp: , Rfl:     torsemide (DEMADEX) 10 mg tablet, Take 10 mg by mouth every morning. 9 am, Disp: , Rfl:   [facility], [facility] AWARxE: Query reviewed and no concerns    Opioid Risk Tool - Revised        \"A score of 2 or lower indicates low risk for future opioid use disorder; a score of >/= 3 indicates high risk for opioid use disorder\"    Physical Exam  Constitutional:       General: He is not in acute distress.     Appearance: He is cachectic. He is ill-appearing.   HENT:      Mouth/Throat:      Mouth: Mucous membranes are moist.      Pharynx: Oropharynx is clear.   Cardiovascular:      Rate and Rhythm: Normal rate. Rhythm irregular.      Pulses:           Radial pulses are 1+ on the right side and 1+ on the left side.        Dorsalis pedis pulses are 1+ on the right side and 1+ on the left side.   Pulmonary:      Breath sounds: Examination of the right-upper field reveals decreased breath sounds. Examination of the left-upper field reveals decreased breath sounds. " Examination of the right-middle field reveals decreased breath sounds. Examination of the right-lower field reveals decreased breath sounds. Decreased breath sounds present. No wheezing, rhonchi or rales.      Comments: Breath sounds absent in LLL  Abdominal:      General: The ostomy site is clean. Bowel sounds are normal.      Palpations: Abdomen is soft.      Tenderness: There is no abdominal tenderness.       Genitourinary:     Comments: Will catheter draining clear, yellow urine  Musculoskeletal:      Right lower leg: Edema present.      Left lower leg: Edema present.      Comments: Debilitated   Skin:     Capillary Refill: Capillary refill takes 2 to 3 seconds.      Coloration: Skin is pale.      Findings: No wound.   Neurological:      Mental Status: He is alert.   Psychiatric:         Attention and Perception: He perceives auditory and visual hallucinations.         Mood and Affect: Mood is depressed.         Speech: Speech is tangential.         Behavior: Behavior is cooperative.         Thought Content: Thought content is delusional.         Cognition and Memory: Cognition is impaired.        Palliative Assessment    Palliative Performance Scale: 40%    Mid Upper Arm Circumference (MUAC):    ESASr: Pain 0 Tiredness 2 Drowsiness 0 Nausea 0 Lack of Appetite 0 Shortness of Breath 0 Depression 0 Anxiety 0 Best Well Being 7  Palliative Disease State: Deteriorating despite treatments, At high risk for hospitalization, and At high risk for emergency department visits  Patient Prognostic Awareness: Demonstrates limited understanding of disease process, Demonstrates limited understanding of prognosis, and Demonstrates limited understanding of resuscitation status  Palliative Care Risk Stratification: Level Three- High risk.  Requires highest level of intervention.  Not likely to return to usual care    Goals of Care  Advance Directives Status:  Not Received  Advance Directives:    Document type(s)  Son to provide  at future visit  Healthcare Proxy Name, Contact Information, and Relationship Son, Marko Elena  Goals of Care Discussion: Yes  Change in medical orders resulting from advance care planning discussions: No change in medical orders  Code status  Hospital record indicates DNR/DNI but patient does not yet have a POLST uploaded into system. He deferred completing the document at this initial visit      Advance Care Planning    Palliative Home Based Care Advance Care Planning Note      Patient Name: Samy Elena                                                                                 Patient MRN: [id number]  Discussion Date: 02/25/25   Discussion Participants: patient, son, and caregiver   Start time: 12:50 PM  End time: 1:10 PM  Patients current medical state including medical necessity for ACP discussion:    Patient with poor prognosis measured in months or weeks rather than years, Patient with frequent rehospitalizations, Patient with frequent visits to the emergency department, and Patient at risk for overmedicalized death  Todays participants wished to discuss advance care planning. The patient was present for the conversation and able to participate. The following summarizes our discussion.     Patient asked about hospice services. Reviewed hospice benefit and explained eligibility process. Son confirmed he is patient's medical POA. Patient states he is a DNR code status but was not yet ready to complete documentation. Reviewed POLST and POA documentation. Son agreed to review information and will complete with father.    During our visit today, we discussed:     Health Care Agent/Surrogate Decision Maker   Patient identifies their son, Marko Elena, as their health care agent if they are unable to make their own medical decisions.    Treatment Decisions/ types of medical care preferred by the patient  Agreeable with return to the hospital for limited additional interventions    Patient  Capacity  Patient has unreliable capacity to make complex medical decisions    Attestation Statement:  I have spent a total of 20 minutes in face-to-face discussion of patient advance care planning with the patient and/or surrogate decision makers.  No active management of the patients problem(s) was undertaken during the time period reported      HEIDI Machado  2/25/2025 10:13 PM          Assessment/Plan       Diagnoses and all orders for this visit:    Palliative care by specialist (Primary)  Assessment & Plan:  Code Status: DNR  Patient questionable capacity to make medical decisions due to intermittent confusion and hallucinations  POA: Son, Marko Elena  Advanced Directives: To be completed at next visit  Treatment decisions/Goals of Care: Agreeable to return to the hospital as needed  Family Support: Patient residents at home with live-in caregiver, Melinda. Three local sons are active in patient's care    Spiritual Support: Druze. Receives weekly Eucharistic  [facility]  Next Palliative Follow Up Visit: 1 week      Acute on chronic systolic (congestive) heart failure (CMS/HCC)  Assessment & Plan:  Follows with PCP  Maintained on ASA, metoprolol, potassium chloride, and torsemide  Elevate legs  Monitor weights      Gastrointestinal hemorrhage associated with intestinal diverticulosis  Assessment & Plan:  Hgl 8.6, Hct 28.1  Patient will seek transfusion therapy  Increased fluids as tolerated  Monitor for blood in ostomy / perry bags and for rectal bleeding      CKD (chronic kidney disease) stage 4, GFR 15-29 ml/min (CMS/HCC)  Assessment & Plan:  Improved  Current renal values indicate CKD Stage 3b - BUN 26, Cr 1.5, eGFR 43.1  Minimize added salt  Trend labs        Depression, unspecified depression type  Assessment & Plan:  Managed by PCP  Situational. 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 No evidence of infection  Caregiver changes bag as needed  Monitor for blood clots/bleeding      Dysphagia, unspecified type  Assessment & Plan:  Dysphonia  Observed coughing while eating ice cream  Complains of thickened secretions  Remain upright for all meals  Monitor for aspiration      Debility  Assessment & Plan:  Debility likely multifactorial in the setting of advanced cardiac disease and hallucinations  Frail  Albumin: 2.6  Generalized sarcopenia   PPS: 40%.   ADLs: Requires the assistance of one to two for transfers and bed mobility. Full assistance of one for bathing, toileting, and dressing. Uses wheelchair for locomotion  Maintain fall risk precautions  Patient remains high risk for further deterioration and functional decline

## 2025-02-26 ENCOUNTER — TELEPHONE (OUTPATIENT)
Dept: PALLIATIVE CARE | Facility: CLINIC | Age: 89
End: 2025-02-26
Payer: MEDICARE

## 2025-02-26 ENCOUNTER — PATIENT OUTREACH (OUTPATIENT)
Dept: CASE MANAGEMENT | Facility: CLINIC | Age: 89
End: 2025-02-26
Payer: MEDICARE

## 2025-02-26 ENCOUNTER — TELEPHONE (OUTPATIENT)
Dept: RADIOLOGY | Facility: HOSPITAL | Age: 89
End: 2025-02-26
Payer: MEDICARE

## 2025-02-26 NOTE — ASSESSMENT & PLAN NOTE
Improved  Current renal values indicate CKD Stage 3b - BUN 26, Cr 1.5, eGFR 43.1  Minimize added salt  Trend labs

## 2025-02-26 NOTE — PROGRESS NOTES
Hospital to Home follow-up call   on: [phone]   Week 1  ACM spoke to Marko-son.   Marko barron pt is doing okay this week.   Marko barron SOPHIA Hayden was out to home to see pt today.   Marko barron pt is having periods of restlessness at night and Jason is looking into prescribing something for pt.    Marko barron pt will have Thoracentesis 2/27/25 at [facility] for fluid around lung. SOPHIA Hayden is hopeful that this will help pt's SOB.   Pt saw Hematology on 2/25/25, pt will start Iron Infusion next Tuesday for his low hemoglobin.  Palliative NP was out to visit pt yesterday and will return 3/11/25.   Pt has live in    24/7 [facility].   Pt is compliant with medication regimen.   Pt is continuing with services through [facility]  ACM to follow up in 1 week-on/around 3/6/25    Care Plan: Transition of Care Template   Updates made by Pilar Kirkpatrick RN since 2/26/2025 12:00 AM        Problem: Progression and Care Needs         Goal: Patient will follow up with medical specialists and PCP         Task: Assist patient with scheduling of PCP appoinment within recommeneded time frame as needed Completed 2/26/2025   Responsible User: Pilar Kirkpatrick RN                  Pilar Kirkpatrick RN BSN  Ambulatory Care Manager   [phone]

## 2025-02-26 NOTE — ASSESSMENT & PLAN NOTE
Code Status: DNR  Patient questionable capacity to make medical decisions due to intermittent confusion and hallucinations  POA: Son, Marko Elena  Advanced Directives: To be completed at next visit  Treatment decisions/Goals of Care: Agreeable to return to the hospital as needed  Family Support: Patient residents at home with live-in caregiver, Melinda. Three local sons are active in patient's care    Spiritual Support: Congregational. Receives weekly Eucharistic  visits from local [facility]  Next Palliative Follow Up Visit: 1 week

## 2025-02-26 NOTE — ASSESSMENT & PLAN NOTE
Dysphonia  Observed coughing while eating ice cream  Complains of thickened secretions  Remain upright for all meals  Monitor for aspiration

## 2025-02-26 NOTE — TELEPHONE ENCOUNTER
Patient's son Ethan reports that patient is going to get recommendation for sleep medication from Dr. Hernandez's office. Requests NP be notified.

## 2025-02-26 NOTE — ASSESSMENT & PLAN NOTE
Hgl 8.6, Hct 28.1  Patient will seek transfusion therapy  Increased fluids as tolerated  Monitor for blood in ostomy / perry bags and for rectal bleeding

## 2025-02-26 NOTE — ASSESSMENT & PLAN NOTE
Follows with PCP  Maintained on ASA, metoprolol, potassium chloride, and torsemide  Elevate legs  Monitor weights

## 2025-02-26 NOTE — ASSESSMENT & PLAN NOTE
Stoma intact. No evidence of infection  Caregiver changes bag as needed  Monitor for blood clots/bleeding

## 2025-02-26 NOTE — ASSESSMENT & PLAN NOTE
Managed by PCP  Situational. Able to be redirected  Recently started buproprion  Continue motivational interviewing to allow patient therapeutic opportunity to express fears and concerns surrounding chronic conditions

## 2025-02-26 NOTE — ASSESSMENT & PLAN NOTE
Debility likely multifactorial in the setting of advanced cardiac disease and hallucinations  Frail  Albumin: 2.6  Generalized sarcopenia   PPS: 40%.   ADLs: Requires the assistance of one to two for transfers and bed mobility. Full assistance of one for bathing, toileting, and dressing. Uses wheelchair for locomotion  Maintain fall risk precautions  Patient remains high risk for further deterioration and functional decline

## 2025-02-27 ENCOUNTER — APPOINTMENT (OUTPATIENT)
Dept: RADIOLOGY | Facility: HOSPITAL | Age: 89
End: 2025-02-27
Attending: PHYSICIAN ASSISTANT
Payer: MEDICARE

## 2025-02-27 ENCOUNTER — HOSPITAL ENCOUNTER (OUTPATIENT)
Dept: RADIOLOGY | Facility: HOSPITAL | Age: 89
Discharge: HOME | End: 2025-02-27
Attending: PHYSICIAN ASSISTANT | Admitting: RADIOLOGY
Payer: MEDICARE

## 2025-02-27 VITALS
OXYGEN SATURATION: 93 % | HEART RATE: 68 BPM | SYSTOLIC BLOOD PRESSURE: 101 MMHG | RESPIRATION RATE: 21 BRPM | DIASTOLIC BLOOD PRESSURE: 66 MMHG

## 2025-02-27 DIAGNOSIS — J90 PLEURAL EFFUSION: ICD-10-CM

## 2025-02-27 PROCEDURE — C1729 CATH, DRAINAGE: HCPCS

## 2025-02-27 PROCEDURE — 71045 X-RAY EXAM CHEST 1 VIEW: CPT

## 2025-02-27 PROCEDURE — 25000000 HC PHARMACY GENERAL: Performed by: PHYSICIAN ASSISTANT

## 2025-02-27 RX ORDER — LIDOCAINE HYDROCHLORIDE 10 MG/ML
INJECTION, SOLUTION INFILTRATION; PERINEURAL
Status: COMPLETED | OUTPATIENT
Start: 2025-02-27 | End: 2025-02-27

## 2025-02-27 RX ADMIN — LIDOCAINE HYDROCHLORIDE 10 ML: 10 INJECTION, SOLUTION INFILTRATION; PERINEURAL at 10:20

## 2025-02-27 NOTE — POST-PROCEDURE NOTE
Interventional Radiology Brief Postprocedure Note    Samy Elena     Attending: SOPHIA Andrade MD    Assistant: GIGI Fields    Diagnosis: recurrent left pleural effusion    Description of procedure: left thoracentesis    Contrast: none     Anesthesia:  Local (Lidocaine)    Volume of Lidocaine Utilized (ml): 10     Medications: none     Complications: None      Estimated Blood Loss: Estimated Blood Loss: 0-10 ml    Anticoagulation: n/a    Specimens: 800mL clear hilario pleural fluid    Findings: successful therapeutic u/s guided left thoracentesis, removed 800mL, VSS, pt had discomfort at procedure end and requested to stop drainage, still large left pleural effusion remaining     2/27/2025 10:33 AM

## 2025-02-27 NOTE — DISCHARGE INSTRUCTIONS
THORACENTESIS    DISCHARGE INSTRUCTIONS  You have had a thoracentesis, or the fluid removed from around your lung.  You may resume your normal diet.  You may resume your normal medications.   Do not drive, engage in heavy lifting or strenuous activity or drink any alcoholic beverages for the next 24 hours.  You may resume normal activity in 24 hours, as tolerated.  Keep the area covered and dry for the next 24 hours. Do not submerge the area in water (i.e. tub baths, hot tubs or swimming pools for 5 days). You may shower.   It is normal to experience some pain at the site over the next few days. You may take Tylenol for that pain.  Notify your primary physician and/or Interventional Radiologist IMMEDIATELY if any of the following occur:  · Fever of 101° F (38 ° C) or chills  · Swelling and or redness in the area of the puncture site  · Worsening pain  · Lightheadedness or dizziness upon standing  - Worsening shortness of breath  - Sudden severe chest pain.  Physician Contact Information  If you have a problem that you believe requires immediate attention, you should go to the Emergency Room, either at [facility] or the closest hospital. If you believe that your problem can safely wait until you speak to a physician, you should contact your doctor or Interventional Radiologist.   It is usually easier to contact your own physician by calling the office, or after hours through the answering service. If you do not know the number, the hospital  can connect you by calling [phone].   If you have concerns that need to be answered by the Interventional Radiologist, you can reach him/ her as follows:   During regular weekday hours (8AM to 5PM), call [phone] and ask the technologist to connect you with the Interventional Radiologist.   During off hours for emergencies call [phone] and ask the hospital  to contact the Interventional Radiologist on-call.    [facility] strongly recommends that you visit a Primary Care Provider (PCP) regularly. Your PCP can help you implement the recommendations we gave you today, coordinate care among your specialists, as well as make sure you are up to date with wellness exams, immunizations and preventive screenings. Your PCP can also help when you are feeling sick, potentially avoiding the need for urgent care or emergency department visits. For these reasons, it is important that you follow up with your PCP at least annually or more often based upon your medical conditions. If you do not have a PCP, please call [phone] ([phone]) or go to Find a Doctor for help with finding one.

## 2025-03-05 ENCOUNTER — HOSPITAL ENCOUNTER (OUTPATIENT)
Dept: RADIOLOGY | Facility: HOSPITAL | Age: 89
Discharge: HOME | End: 2025-03-05
Attending: PHYSICIAN ASSISTANT | Admitting: RADIOLOGY
Payer: MEDICARE

## 2025-03-05 ENCOUNTER — PATIENT OUTREACH (OUTPATIENT)
Dept: CASE MANAGEMENT | Facility: CLINIC | Age: 89
End: 2025-03-05
Payer: MEDICARE

## 2025-03-05 ENCOUNTER — APPOINTMENT (OUTPATIENT)
Dept: RADIOLOGY | Facility: HOSPITAL | Age: 89
End: 2025-03-05
Attending: PHYSICIAN ASSISTANT
Payer: MEDICARE

## 2025-03-05 VITALS
HEART RATE: 87 BPM | SYSTOLIC BLOOD PRESSURE: 101 MMHG | OXYGEN SATURATION: 96 % | DIASTOLIC BLOOD PRESSURE: 84 MMHG | RESPIRATION RATE: 18 BRPM

## 2025-03-05 DIAGNOSIS — J90 PLEURAL EFFUSION: ICD-10-CM

## 2025-03-05 PROCEDURE — C1729 CATH, DRAINAGE: HCPCS

## 2025-03-05 PROCEDURE — 0W9B3ZZ DRAINAGE OF LEFT PLEURAL CAVITY, PERCUTANEOUS APPROACH: ICD-10-PCS | Performed by: STUDENT IN AN ORGANIZED HEALTH CARE EDUCATION/TRAINING PROGRAM

## 2025-03-05 PROCEDURE — 36100330 IR THORACENTESIS

## 2025-03-05 PROCEDURE — 71045 X-RAY EXAM CHEST 1 VIEW: CPT

## 2025-03-05 PROCEDURE — 25000000 HC PHARMACY GENERAL: Performed by: PHYSICIAN ASSISTANT

## 2025-03-05 RX ORDER — LIDOCAINE HYDROCHLORIDE 10 MG/ML
INJECTION, SOLUTION INFILTRATION; PERINEURAL
Status: COMPLETED | OUTPATIENT
Start: 2025-03-05 | End: 2025-03-05

## 2025-03-05 RX ADMIN — LIDOCAINE HYDROCHLORIDE 10 ML: 10 INJECTION, SOLUTION INFILTRATION; PERINEURAL at 08:14

## 2025-03-05 NOTE — DISCHARGE INSTRUCTIONS
THORACENTESIS    DISCHARGE INSTRUCTIONS  You have had a thoracentesis, or the fluid removed from around your lung.  You may resume your normal diet.  You may resume your normal medications.   Do not drive, engage in heavy lifting or strenuous activity or drink any alcoholic beverages for the next 24 hours.  You may resume normal activity in 24 hours, as tolerated.  Keep the area covered and dry for the next 24 hours. Do not submerge the area in water (i.e. tub baths, hot tubs or swimming pools for 5 days). You may shower.   It is normal to experience some pain at the site over the next few days. You may take Tylenol for that pain.  Notify your primary physician and/or Interventional Radiologist IMMEDIATELY if any of the following occur:  · Fever of 101° F (38 ° C) or chills  · Swelling and or redness in the area of the puncture site  · Worsening pain  · Lightheadedness or dizziness upon standing  - Worsening shortness of breath  - Sudden severe chest pain.  Physician Contact Information  If you have a problem that you believe requires immediate attention, you should go to the Emergency Room, either at [facility] or the closest hospital. If you believe that your problem can safely wait until you speak to a physician, you should contact your doctor or Interventional Radiologist.   It is usually easier to contact your own physician by calling the office, or after hours through the answering service. If you do not know the number, the hospital  can connect you by calling [phone].   If you have concerns that need to be answered by the Interventional Radiologist, you can reach him/ her as follows:   During regular weekday hours (8AM to 5PM), call [phone] and ask the technologist to connect you with the Interventional Radiologist.   During off hours for emergencies call [phone] and ask the hospital  to contact the Interventional Radiologist on-call.    [facility] strongly recommends that you visit a Primary Care Provider (PCP) regularly. Your PCP can help you implement the recommendations we gave you today, coordinate care among your specialists, as well as make sure you are up to date with wellness exams, immunizations and preventive screenings. Your PCP can also help when you are feeling sick, potentially avoiding the need for urgent care or emergency department visits. For these reasons, it is important that you follow up with your PCP at least annually or more often based upon your medical conditions. If you do not have a PCP, please call [phone] ([phone]) or go to Find a Doctor for help with finding one.

## 2025-03-05 NOTE — PROGRESS NOTES
Hospital to Home follow-up call   on: [phone]   Week 2  Left voicemail requesting update of status. Provided ACM contact number for call back.    If no return call, ACM will continue to follow.    ACM to follow up in 1 week-on/around 3/13/25    Pilar Kirkpatrick RN BSN  Ambulatory Care Manager   [phone]

## 2025-03-05 NOTE — POST-PROCEDURE NOTE
Interventional Radiology Brief Postprocedure Note    Samy Elena     Attending: SOPHIA Seals / Jostin Miller M.D.    Assistant: n/a    Diagnosis: SOB    Description of procedure: US guided left thoracentesis    Contrast: none     Anesthesia:  Local (Lidocaine)    Volume of Lidocaine Utilized (ml): 10ml     Medications: none     Complications: None      Estimated Blood Loss: Estimated Blood Loss: 0-10 ml    Anticoagulation: n/a    Specimens: 1L clear hilario fluid    Findings: Successful US Guided left thoracentesis with 1L removed. VSS Throughout. Large effusion remains. CXR pending.    3/5/2025 8:26 AM

## 2025-03-10 NOTE — PROGRESS NOTES
Home Based Palliative Medicine Established Visit    Patient ID: Samy Elena is a 89 y.o. male  MRN: [id number]  Date of Visit: 3/11/2025  Referring Physician: No referring provider defined for this encounter.  Primary Care Physician: Zachery Hernandez MD  Reason for Visit: Palliative Care Follow-up    History of Present Illness  Source: Patient, Caregiver, Medical Records, and Son     89-year-old male received seated in wheelchair in living room accompanied by his aide, Melinda, and his son, Marko. Recently received emergent thoracentesis in the setting of advanced cardiac disease. Son reports 1000 mL of fluid was draining from patient's left pleural space, with 1000 mL left pleural residual. Patient prescribed amoxicillin for lower respiratory infection. Confusion and hallucinations decreased following thoracentesis and initiation of antibiotic therapy. Unable to attend recent hematology appointment due to increased lower extremity weakness and endurance. Cardiology recently discontinued metoprolol due to hypotension. Continues with good appetite, denies dysphagia. Continues with weekly labs to monitor hemoglobin and hematocrit levels. Slept well the past two night. Regular bowel movements via ileostomy.     Assessment/Plan   Diagnoses and all orders for this visit:    Palliative care by specialist (Primary)  Assessment & Plan:  Code Status: DNR/DNI  Patient does not have capacity to make medication decisions due to increasing confusion and hallucinations  POA: Son, Marko Elena. Will provide documentation at future visit  Advanced Directives: POLST completed and uploaded into EHR  Treatment decisions/Goals of Care: Agreeable to return to the hospital if comfort needs cannot be met at home, accepts antibiotics for comfort, declines artificial hydration and nutrition  Family Support: Patient residents at home with live-in caregiver, Melinda. Three local sons are active in patient's care. One additional son also  involved but resides in [place], [place]  Spiritual Support: Amish. Re[facility]  Next Palliative Follow Up Visit: 3 weeks      HFrEF (heart failure with reduced ejection fraction) (CMS/[facility])  Assessment & Plan:  Follows with Cardiology. Follow up appointment next week  Worsening  EF 40-45%  Need for recurrent thoracenteses in the setting of left pleural effusions  Monitor for restlessness and new onset of shortness of breath      Chronic anemia  Assessment & Plan:  Stable  Weekly labs monitored by PCP  Monitor for shortness of breath or restlessness        Thrombocytopenia (CMS/[facility])  Assessment & Plan:  Plt 101  Trend CBC      Rectal bleeding  Assessment & Plan:  Stable  Continues on anticoagulation therapy  Monitor for stoma / ileostomy or anal bleeding      Debility  Assessment & Plan:  Debility likely multifactorial in the setting of advanced cardiac disease and hallucinations  Frail, generalized sarcopenia  Albumin: 2.6  PPS: 40%.   ADLs: Non-weight bearing. Requires the assistance of one to two for transfers and bed mobility. Full assistance of one for bathing, toileting, and dressing. 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    Review of Systems   Constitutional:  Positive for fatigue.   HENT:  Negative for trouble swallowing.    Respiratory:  Negative for cough, chest tightness and shortness of breath.    Cardiovascular:  Negative for chest pain and leg swelling.   Gastrointestinal:  Negative for abdominal distention, anal bleeding, blood in stool, nausea and vomiting.   Genitourinary:  Negative for dysuria and hematuria.   Musculoskeletal:  Positive for myalgias.   Neurological:  Positive for speech difficulty and weakness.   Psychiatric/Behavioral:  Positive for confusion and hallucinations. Negative for sleep disturbance. The patient is not nervous/anxious.        Physical Exam  Constitutional:       General: He is not in acute distress.     Appearance: He is cachectic. He is ill-appearing.   HENT:      Mouth/Throat:      Mouth: Mucous membranes are moist.      Pharynx: Oropharynx is clear.   Cardiovascular:      Rate and Rhythm: Normal rate. Rhythm irregular.      Pulses:           Radial pulses are 1+ on the right side and 1+ on the left side.        Dorsalis pedis pulses are 1+ on the right side and 1+ on the left side.      Heart sounds: Heart sounds are distant.   Pulmonary:      Effort: Pulmonary effort is normal.      Breath sounds: Decreased air movement present. Examination of the right-upper field reveals decreased breath sounds and rales. Examination of the left-upper field reveals decreased breath sounds and rales. Examination of the right-middle field reveals decreased breath sounds and rales. Examination of the right-lower field reveals decreased breath sounds and rales. Examination of the left-lower field reveals rales. Decreased breath sounds and rales present. No wheezing or rhonchi.   Abdominal:      General: The ostomy site is clean. Bowel sounds are normal.      Palpations: Abdomen is soft.      Tenderness: There is no abdominal tenderness.       Genitourinary:     Comments: Will catheter draining  clear, yellow urine  Musculoskeletal:      Right lower leg: No edema.      Left lower leg: No edema.      Comments: Debilitated   Skin:     Capillary Refill: Capillary refill takes less than 2 seconds.      Coloration: Skin is pale.      Findings: No wound.   Neurological:      Mental Status: He is alert. Mental status is at baseline. He is confused.      Motor: Weakness present.      Gait: Gait abnormal.   Psychiatric:         Attention and Perception: He perceives auditory and visual hallucinations.         Mood and Affect: Affect normal.         Behavior: Behavior is cooperative.         Cognition and Memory: Cognition is impaired. Memory is impaired.          Data Review  Labs Reviewed: I have reviewed the patient's labs to the time of note.  No new clinical concern.  RBC 2.70, Hgl 8.5, Hct 26.5, Plat 101  Radiology and Imaging Reviewed: No new Imaging  Cardiac Studies Reviewed: No new Studies  Medicine Tests Reviewed: No New Tests  [facility] Portal, [facility] AWARxE: Query reviewed and no concerns    Palliative Assessment    Palliative Performance Scale: 40%  Mid Upper Arm Circumference (MUAC):  21.5 cm 3/11/25  ESASr: Pain 0 Tiredness 2 Drowsiness 0 Nausea 0 Lack of Appetite 2 Shortness of Breath 0 Depression 0 Anxiety 0 Best Well Being 8  Palliative Disease State: Deteriorating despite treatments, At high risk for hospitalization, and At high risk for emergency department visits  Patient Prognostic Awareness: Patient unable to participate in discussion about disease process or prognosis  Palliative Care Risk Stratification: Level Three- High risk.  Requires highest level of intervention.  Not likely to return to usual care  Spiritual Assessment: Karoline and Belief: Mormon  Environmental Assessment:  Home environment safe and suitable for care  Caregiver [place]: No burden of care issues identified and Caregiver willing and able to safely provide needed care    Goals of Care  Code status: Do not  resuscitate  Advance Directives: Document type(s) POLST completed and uploaded into EHR  Healthcare Proxy Name, Contact Information, and Relationship: SonMarko  Goals of Care Discussion: Yes  Change in medical orders resulting from advance care planning discussions: Decision to change code status    Advance Care Planning    Palliative Home Based Care Advance Care Planning Note      Patient Name: Samy Elena                                                                                 Patient MRN: [id number]  Discussion Date: 03/11/25   Discussion Participants: son and caregiver, Melinda   Start time: 11:46 AM  End time: 12:16 PM    Patients current medical state including medical necessity for ACP discussion:    Patient with poor prognosis measured in months or weeks rather than years, Patient with life limiting illness, Patient with frequent rehospitalizations, Patient with frequent visits to the emergency department, and Patient at risk for overmedicalized death  Todays participants wished to discuss advance care planning. The patient was present for the conversation and unable to participate due to cognitive impairment. The following summarizes our discussion.     Discussed patient goals of care in light of his increasing need for thoracenteses. Son/POA would like patient to attend specialist visits over the next week to determine whether a PleurX catheter can be placed so patient need not return to hospital for ongoing thoracenteses. There was discussion as to whether family would be willing to stop weekly labs monitoring patient's hemoglobin and hematocrit. Son/POA would like to discuss this option with his three brothers. Family may be open to hospice should they opt for comfort care only. Son agreed to provide POA documentation at future visit.     During our visit today, we discussed:     Code Status  I counseled related to code status and the benefits and burdens of CPR and mechanical  ventilation in the setting of current clinical condition and co-morbidities.  Discussed code status options and limitations of resuscitation, including possibility of further debility if resuscitation attempt is successful. Also discussed that resuscitation will not reverse or cure underlying medical issues.  and DNR/DNI/Do Not Attempt Resuscitation    Medical Status/Prognosis  Weeks to Months     Goals of Care  Goals are Palliative with a focus on improving quality of life and Comfort Focused  Patient wants to focus on minimizing shortness of breath    Treatment Decisions/ types of medical care preferred by the patient  Agreeable with return to the hospital for limited additional interventions, including care described above. Use medical treatment, IV fluids and cardiac monitor as indicated. Do not use intubation, advanced airway interventions, or mechanical ventilation. Transfer to hospital if indicated. Avoid intensive care if possible and Antibiotic therapy: Determine use or limitation of antibiotics when infection occurs, with comfort as goal.    Advance Care Documents  Patient has completed health care power of  paperwork and Counseled patient/family on GOC, completed POLST, and uploaded a copy to EHR.    Patient Capacity  Patient has unreliable capacity to make complex medical decision making.    Attestation Statement:  I have spent a total of 30 minutes in face-to-face discussion of patient advance care planning with the patient and/or surrogate decision makers.  No active management of the patients problem(s) was undertaken during the time period reported    HEIDI Machado  3/11/2025 8:58 PM      The start time of this Face to Face visit was 11:30 AM. The end time of this Face to Face visit was 11:45 AM. I spent  75 minutes on this date of service performing the following activities: obtaining history, performing examination, documenting, preparing for visit, obtaining / reviewing records,  providing counseling and education, and independently reviewing study/studies. This time excludes time spent discussing ACP.

## 2025-03-11 ENCOUNTER — HOME VISIT (OUTPATIENT)
Dept: PALLIATIVE CARE | Facility: CLINIC | Age: 89
End: 2025-03-11
Payer: MEDICARE

## 2025-03-11 VITALS
OXYGEN SATURATION: 96 % | RESPIRATION RATE: 16 BRPM | SYSTOLIC BLOOD PRESSURE: 106 MMHG | DIASTOLIC BLOOD PRESSURE: 60 MMHG | HEART RATE: 64 BPM | TEMPERATURE: 97.9 F

## 2025-03-11 DIAGNOSIS — D69.6 THROMBOCYTOPENIA (CMS/HCC): ICD-10-CM

## 2025-03-11 DIAGNOSIS — I50.20 HFREF (HEART FAILURE WITH REDUCED EJECTION FRACTION) (CMS/HCC): ICD-10-CM

## 2025-03-11 DIAGNOSIS — Z51.5 PALLIATIVE CARE BY SPECIALIST: Primary | ICD-10-CM

## 2025-03-11 DIAGNOSIS — K62.5 RECTAL BLEEDING: ICD-10-CM

## 2025-03-11 DIAGNOSIS — R53.81 DEBILITY: ICD-10-CM

## 2025-03-11 DIAGNOSIS — D64.9 CHRONIC ANEMIA: ICD-10-CM

## 2025-03-11 PROBLEM — I10 HYPERTENSION: Status: RESOLVED | Noted: 2023-08-03 | Resolved: 2025-03-11

## 2025-03-11 PROBLEM — L89.90 PRESSURE ULCER: Status: RESOLVED | Noted: 2023-12-03 | Resolved: 2025-03-11

## 2025-03-11 PROBLEM — Z86.79 HISTORY OF CHRONIC CHF: Status: RESOLVED | Noted: 2024-07-02 | Resolved: 2025-03-11

## 2025-03-11 PROBLEM — N18.9 ACUTE KIDNEY INJURY SUPERIMPOSED ON CKD (CMS/HCC)  (CMS/HCC): Status: RESOLVED | Noted: 2024-07-28 | Resolved: 2025-03-11

## 2025-03-11 PROBLEM — J18.9 PNEUMONIA: Status: RESOLVED | Noted: 2024-03-26 | Resolved: 2025-03-11

## 2025-03-11 PROBLEM — I50.9 ACUTE DECOMPENSATED HEART FAILURE (CMS/HCC): Status: RESOLVED | Noted: 2024-09-24 | Resolved: 2025-03-11

## 2025-03-11 PROBLEM — Z87.440 HISTORY OF RECURRENT UTIS: Status: RESOLVED | Noted: 2024-07-28 | Resolved: 2025-03-11

## 2025-03-11 PROBLEM — Z91.89 AT RISK FOR OBSTRUCTIVE SLEEP APNEA: Status: RESOLVED | Noted: 2024-07-02 | Resolved: 2025-03-11

## 2025-03-11 PROBLEM — D63.1 ANEMIA DUE TO CHRONIC KIDNEY DISEASE: Status: RESOLVED | Noted: 2024-07-02 | Resolved: 2025-03-11

## 2025-03-11 PROBLEM — I10 HYPERTENSION: Status: RESOLVED | Noted: 2024-07-28 | Resolved: 2025-03-11

## 2025-03-11 PROBLEM — Z87.448 HX OF CHRONIC KIDNEY DISEASE: Status: RESOLVED | Noted: 2025-02-04 | Resolved: 2025-03-11

## 2025-03-11 PROBLEM — N18.9 ANEMIA DUE TO CHRONIC KIDNEY DISEASE: Status: RESOLVED | Noted: 2024-07-02 | Resolved: 2025-03-11

## 2025-03-11 PROBLEM — I48.91 ATRIAL FIBRILLATION (CMS/HCC): Status: RESOLVED | Noted: 2023-08-03 | Resolved: 2025-03-11

## 2025-03-11 PROBLEM — N17.9 ACUTE KIDNEY INJURY SUPERIMPOSED ON CKD (CMS/HCC)  (CMS/HCC): Status: RESOLVED | Noted: 2024-07-28 | Resolved: 2025-03-11

## 2025-03-11 PROBLEM — R69 MULTIPLE MEDICAL PROBLEMS: Status: RESOLVED | Noted: 2024-03-26 | Resolved: 2025-03-11

## 2025-03-11 PROBLEM — I50.23 ACUTE ON CHRONIC SYSTOLIC (CONGESTIVE) HEART FAILURE: Status: RESOLVED | Noted: 2025-02-25 | Resolved: 2025-03-11

## 2025-03-11 PROBLEM — I25.10 CAD (CORONARY ARTERY DISEASE): Status: RESOLVED | Noted: 2023-08-03 | Resolved: 2025-03-11

## 2025-03-11 PROCEDURE — 99350 HOME/RES VST EST HIGH MDM 60: CPT | Mod: 25

## 2025-03-11 PROCEDURE — 99497 ADVNCD CARE PLAN 30 MIN: CPT | Mod: 25

## 2025-03-11 RX ORDER — AMOXICILLIN AND CLAVULANATE POTASSIUM 875; 125 MG/1; MG/1
1 TABLET, FILM COATED ORAL 2 TIMES DAILY
COMMUNITY
Start: 2025-03-06 | End: 2025-04-02 | Stop reason: ALTCHOICE

## 2025-03-11 RX ORDER — CLOPIDOGREL BISULFATE 75 MG/1
75 TABLET ORAL DAILY
Status: ON HOLD | COMMUNITY
Start: 2025-02-10 | End: 2025-04-04 | Stop reason: ENTERED-IN-ERROR

## 2025-03-11 ASSESSMENT — ENCOUNTER SYMPTOMS
HEMATURIA: 0
CONFUSION: 1
DYSURIA: 0
SLEEP DISTURBANCE: 0
FATIGUE: 1
TROUBLE SWALLOWING: 0
NAUSEA: 0
NERVOUS/ANXIOUS: 0
SPEECH DIFFICULTY: 1
WEAKNESS: 1
ABDOMINAL DISTENTION: 0
BLOOD IN STOOL: 0
CHEST TIGHTNESS: 0
COUGH: 0
MYALGIAS: 1
SHORTNESS OF BREATH: 0
HALLUCINATIONS: 1
VOMITING: 0
ANAL BLEEDING: 0

## 2025-03-11 ASSESSMENT — PAIN SCALES - GENERAL: PAINLEVEL_OUTOF10: 0-NO PAIN

## 2025-03-11 NOTE — ASSESSMENT & PLAN NOTE
Debility likely multifactorial in the setting of advanced cardiac disease and hallucinations  Frail, generalized sarcopenia  Albumin: 2.6  PPS: 40%.   ADLs: Non-weight bearing. Requires the assistance of one to two for transfers and bed mobility. Full assistance of one for bathing, toileting, and dressing. Uses wheelchair for locomotion  Maintain fall risk precautions  Patient remains high risk for further deterioration and functional decline

## 2025-03-12 ENCOUNTER — PATIENT OUTREACH (OUTPATIENT)
Dept: CASE MANAGEMENT | Facility: CLINIC | Age: 89
End: 2025-03-12
Payer: MEDICARE

## 2025-03-12 NOTE — PROGRESS NOTES
Hospital to Home follow-up call   on: [phone]   Week 3  Left voicemail requesting update of status. Provided ACM contact number for call back.    If no return call, ACM will continue to follow.    ACM to follow up in 1 week-on/around 3/20/25     UPDATE: 3:07 PM   Son returned my call.   Son states pt is doing okay this week.   Son states his PCP ordered an antibiotic and pt seems to be doing better with antibiotic.   Son states pt had thoracentesis on 3/5 and 1 L was removed. Pt will have another thoracentesis on Friday.   Pt was not able to start Iron infusions due to being too weak.  This will be revisited after next Thoracentesis.   Pt will see Pulmonology 3/25/25.   Pt has 24/7 CG in home Melinda.   Son states pt has been coughing less.   Palliative was last at home 3/11/25.       Pilar Kirkpatrick, RN BSN  Ambulatory Care Manager   [phone]

## 2025-03-12 NOTE — ASSESSMENT & PLAN NOTE
Code Status: DNR/DNI  Patient does not have capacity to make medication decisions due to increasing confusion and hallucinations  POA: Son, Marko Elena. Will provide documentation at future visit  Advanced Directives: POLST completed and uploaded into EHR  Treatment decisions/Goals of Care: Agreeable to return to the hospital if comfort needs cannot be met at home, accepts antibiotics for comfort, declines artificial hydration and nutrition  Family Support: Patient residents at home with live-in caregiver, Melinda. Three local sons are active in patient's care. One additional son also involved but resides in [place], [place]  Spiritual Support: Jewish. Receives weekly Eucharistic  visits from local [facility]  Next Palliative Follow Up Visit: 3 weeks

## 2025-03-12 NOTE — ASSESSMENT & PLAN NOTE
Follows with Cardiology. Follow up appointment next week  Worsening  EF 40-45%  Need for recurrent thoracenteses in the setting of left pleural effusions  Monitor for restlessness and new onset of shortness of breath

## 2025-03-14 ENCOUNTER — APPOINTMENT (OUTPATIENT)
Dept: RADIOLOGY | Facility: HOSPITAL | Age: 89
End: 2025-03-14
Attending: PHYSICIAN ASSISTANT
Payer: MEDICARE

## 2025-03-14 ENCOUNTER — HOSPITAL ENCOUNTER (OUTPATIENT)
Dept: RADIOLOGY | Facility: HOSPITAL | Age: 89
Discharge: HOME | End: 2025-03-14
Attending: PHYSICIAN ASSISTANT | Admitting: RADIOLOGY
Payer: MEDICARE

## 2025-03-14 VITALS
OXYGEN SATURATION: 99 % | RESPIRATION RATE: 20 BRPM | SYSTOLIC BLOOD PRESSURE: 102 MMHG | HEART RATE: 81 BPM | DIASTOLIC BLOOD PRESSURE: 58 MMHG

## 2025-03-14 DIAGNOSIS — J90 PLEURAL EFFUSION: ICD-10-CM

## 2025-03-14 LAB
BASOPHILS # BLD: 0.04 K/UL (ref 0.01–0.1)
BASOPHILS NFR BLD: 0.9 %
DIFFERENTIAL METHOD BLD: ABNORMAL
EOSINOPHIL # BLD: 0.2 K/UL (ref 0.04–0.54)
EOSINOPHIL NFR BLD: 4.7 %
ERYTHROCYTE [DISTWIDTH] IN BLOOD BY AUTOMATED COUNT: 20.6 % (ref 11.6–14.4)
HCT VFR BLD AUTO: 29.7 % (ref 40.1–51)
HGB BLD-MCNC: 9 G/DL (ref 13.7–17.5)
IMM GRANULOCYTES # BLD AUTO: 0.03 K/UL (ref 0–0.08)
IMM GRANULOCYTES NFR BLD AUTO: 0.7 %
INR PPP: 1.5
LYMPHOCYTES # BLD: 0.45 K/UL (ref 1.2–3.5)
LYMPHOCYTES NFR BLD: 10.5 %
MCH RBC QN AUTO: 31.5 PG (ref 28–33.2)
MCHC RBC AUTO-ENTMCNC: 30.3 G/DL (ref 32.2–36.5)
MCV RBC AUTO: 103.8 FL (ref 83–98)
MONOCYTES # BLD: 0.33 K/UL (ref 0.3–1)
MONOCYTES NFR BLD: 7.7 %
NEUTROPHILS # BLD: 3.22 K/UL (ref 1.7–7)
NEUTS SEG NFR BLD: 75.5 %
NRBC BLD-RTO: 0 %
PLATELET # BLD AUTO: 113 K/UL (ref 150–350)
PMV BLD AUTO: 9.5 FL (ref 9.4–12.4)
PROTHROMBIN TIME: 17.5 SEC (ref 12.2–14.5)
RBC # BLD AUTO: 2.86 M/UL (ref 4.5–5.8)
WBC # BLD AUTO: 4.27 K/UL (ref 3.8–10.5)

## 2025-03-14 PROCEDURE — 0W9B3ZZ DRAINAGE OF LEFT PLEURAL CAVITY, PERCUTANEOUS APPROACH: ICD-10-PCS | Performed by: STUDENT IN AN ORGANIZED HEALTH CARE EDUCATION/TRAINING PROGRAM

## 2025-03-14 PROCEDURE — 85025 COMPLETE CBC W/AUTO DIFF WBC: CPT | Performed by: PHYSICIAN ASSISTANT

## 2025-03-14 PROCEDURE — 85610 PROTHROMBIN TIME: CPT | Performed by: PHYSICIAN ASSISTANT

## 2025-03-14 PROCEDURE — 71045 X-RAY EXAM CHEST 1 VIEW: CPT

## 2025-03-14 PROCEDURE — 36415 COLL VENOUS BLD VENIPUNCTURE: CPT | Performed by: PHYSICIAN ASSISTANT

## 2025-03-14 PROCEDURE — 25000000 HC PHARMACY GENERAL: Performed by: PHYSICIAN ASSISTANT

## 2025-03-14 PROCEDURE — C1729 CATH, DRAINAGE: HCPCS

## 2025-03-14 PROCEDURE — 36100330 IR THORACENTESIS

## 2025-03-14 RX ORDER — LIDOCAINE HYDROCHLORIDE 10 MG/ML
INJECTION, SOLUTION INFILTRATION; PERINEURAL
Status: DISCONTINUED | OUTPATIENT
Start: 2025-03-14 | End: 2025-03-14 | Stop reason: HOSPADM

## 2025-03-14 RX ADMIN — LIDOCAINE HYDROCHLORIDE 10 ML: 10 INJECTION, SOLUTION INFILTRATION; PERINEURAL at 13:33

## 2025-03-14 NOTE — DISCHARGE INSTRUCTIONS
THORACENTESIS    DISCHARGE INSTRUCTIONS  You have had a thoracentesis, or the fluid removed from around your lung.  You may resume your normal diet.  You may resume your normal medications.   Do not drive, engage in heavy lifting or strenuous activity or drink any alcoholic beverages for the next 24 hours.  You may resume normal activity in 24 hours, as tolerated.  Keep the area covered and dry for the next 24 hours. Do not submerge the area in water (i.e. tub baths, hot tubs or swimming pools for 5 days). You may shower.   It is normal to experience some pain at the site over the next few days. You may take Tylenol for that pain.  Notify your primary physician and/or Interventional Radiologist IMMEDIATELY if any of the following occur:  · Fever of 101° F (38 ° C) or chills  · Swelling and or redness in the area of the puncture site  · Worsening pain  · Lightheadedness or dizziness upon standing  - Worsening shortness of breath  - Sudden severe chest pain.  Physician Contact Information  If you have a problem that you believe requires immediate attention, you should go to the Emergency Room, either at [facility] or the closest hospital. If you believe that your problem can safely wait until you speak to a physician, you should contact your doctor or Interventional Radiologist.   It is usually easier to contact your own physician by calling the office, or after hours through the answering service. If you do not know the number, the hospital  can connect you by calling [phone].   If you have concerns that need to be answered by the Interventional Radiologist, you can reach him/ her as follows:   During regular weekday hours (8AM to 5PM), call [phone] and ask the technologist to connect you with the Interventional Radiologist.   During off hours for emergencies call [phone] and ask the hospital  to contact the Interventional Radiologist on-call.    [facility] strongly recommends that you visit a Primary Care Provider (PCP) regularly. Your PCP can help you implement the recommendations we gave you today, coordinate care among your specialists, as well as make sure you are up to date with wellness exams, immunizations and preventive screenings. Your PCP can also help when you are feeling sick, potentially avoiding the need for urgent care or emergency department visits. For these reasons, it is important that you follow up with your PCP at least annually or more often based upon your medical conditions. If you do not have a PCP, please call [phone] ([phone]) or go to Find a Doctor for help with finding one.

## 2025-03-14 NOTE — POST-PROCEDURE NOTE
Interventional Radiology Brief Postprocedure Note    Samy Elena     Attending: SOPHIA Seals / Elio Espinoza M.D.    Assistant: n/a    Diagnosis: SOB    Description of procedure: US guided left thoracentesis    Contrast: none     Anesthesia:  Local (Lidocaine)    Volume of Lidocaine Utilized (ml): 10ml     Medications: none     Complications: None      Estimated Blood Loss: Estimated Blood Loss: None    Anticoagulation: n/a    Specimens: 800 ml clear hilario fluid    Findings: Successful US Guided left thoracentesis with 800 ml removed. VSS Throughout. Large effusion remains. CXR pending.    3/14/2025 1:43 PM

## 2025-03-14 NOTE — OR SURGEON
Pre-Procedure patient identification:  I am the primary operating surgeon/proceduralist and I have reviewed the applicable pathology reports and radiology studies for this procedure. I have identified the patient on 03/14/25 at 1:21 PM SOPHIA Seals C

## 2025-03-17 LAB — FUNGUS SPEC CULT: NORMAL

## 2025-03-18 ENCOUNTER — PATIENT OUTREACH (OUTPATIENT)
Dept: CASE MANAGEMENT | Facility: CLINIC | Age: 89
End: 2025-03-18
Payer: MEDICARE

## 2025-04-01 LAB — MYCOBACTERIUM SPEC CULT: NORMAL

## 2025-04-02 ENCOUNTER — HOSPITAL ENCOUNTER (INPATIENT)
Facility: HOSPITAL | Age: 89
LOS: 2 days | Discharge: HOME HEALTH | DRG: 307 | End: 2025-04-04
Attending: EMERGENCY MEDICINE | Admitting: STUDENT IN AN ORGANIZED HEALTH CARE EDUCATION/TRAINING PROGRAM
Payer: MEDICARE

## 2025-04-02 ENCOUNTER — HOSPITAL ENCOUNTER (OUTPATIENT)
Dept: RADIOLOGY | Facility: HOSPITAL | Age: 89
Discharge: HOME | DRG: 307 | End: 2025-04-02
Attending: PHYSICIAN ASSISTANT | Admitting: RADIOLOGY
Payer: MEDICARE

## 2025-04-02 ENCOUNTER — APPOINTMENT (EMERGENCY)
Dept: RADIOLOGY | Facility: HOSPITAL | Age: 89
DRG: 307 | End: 2025-04-02
Attending: PHYSICIAN ASSISTANT
Payer: MEDICARE

## 2025-04-02 ENCOUNTER — APPOINTMENT (OUTPATIENT)
Dept: RADIOLOGY | Facility: HOSPITAL | Age: 89
DRG: 307 | End: 2025-04-02
Payer: MEDICARE

## 2025-04-02 ENCOUNTER — APPOINTMENT (EMERGENCY)
Dept: RADIOLOGY | Facility: HOSPITAL | Age: 89
DRG: 307 | End: 2025-04-02
Attending: EMERGENCY MEDICINE
Payer: MEDICARE

## 2025-04-02 VITALS
RESPIRATION RATE: 20 BRPM | OXYGEN SATURATION: 95 % | DIASTOLIC BLOOD PRESSURE: 70 MMHG | HEART RATE: 60 BPM | SYSTOLIC BLOOD PRESSURE: 110 MMHG

## 2025-04-02 DIAGNOSIS — R47.89 WORD FINDING DIFFICULTY: Primary | ICD-10-CM

## 2025-04-02 DIAGNOSIS — J90 PLEURAL EFFUSION: ICD-10-CM

## 2025-04-02 DIAGNOSIS — I48.20 CHRONIC ATRIAL FIBRILLATION (CMS/HCC): ICD-10-CM

## 2025-04-02 DIAGNOSIS — N39.0 COMPLICATED UTI (URINARY TRACT INFECTION): ICD-10-CM

## 2025-04-02 DIAGNOSIS — N18.31 CKD STAGE 3A, GFR 45-59 ML/MIN (CMS/HCC): ICD-10-CM

## 2025-04-02 DIAGNOSIS — N13.9 URINARY OBSTRUCTION: ICD-10-CM

## 2025-04-02 DIAGNOSIS — G93.40 ACUTE ENCEPHALOPATHY: ICD-10-CM

## 2025-04-02 PROBLEM — R47.1 DYSARTHRIA: Status: ACTIVE | Noted: 2025-01-01

## 2025-04-02 PROBLEM — D53.9 MACROCYTIC ANEMIA: Status: ACTIVE | Noted: 2025-01-01

## 2025-04-02 LAB
ALBUMIN SERPL-MCNC: 2.8 G/DL (ref 3.5–5.7)
ALP SERPL-CCNC: 94 IU/L (ref 34–125)
ALT SERPL-CCNC: 6 IU/L (ref 7–52)
ANION GAP SERPL CALC-SCNC: 7 MEQ/L (ref 3–15)
APTT PPP: 30 SEC (ref 23–35)
AST SERPL-CCNC: 29 IU/L (ref 13–39)
BACTERIA URNS QL MICRO: 1 /HPF
BILIRUB SERPL-MCNC: 0.8 MG/DL (ref 0.3–1.2)
BILIRUB UR QL STRIP.AUTO: NEGATIVE MG/DL
BUN SERPL-MCNC: 30 MG/DL (ref 7–25)
CALCIUM SERPL-MCNC: 8.2 MG/DL (ref 8.6–10.3)
CHLORIDE SERPL-SCNC: 106 MEQ/L (ref 98–107)
CLARITY UR REFRACT.AUTO: ABNORMAL
CO2 SERPL-SCNC: 25 MEQ/L (ref 21–31)
COLOR UR AUTO: YELLOW
CREAT SERPL-MCNC: 1.6 MG/DL (ref 0.7–1.3)
EGFRCR SERPLBLD CKD-EPI 2021: 39.9 ML/MIN/1.73M*2
ERYTHROCYTE [DISTWIDTH] IN BLOOD BY AUTOMATED COUNT: 19.9 % (ref 11.6–14.4)
FOLATE SERPL-MCNC: 15 NG/ML
GLUCOSE BLD-MCNC: 115 MG/DL (ref 70–99)
GLUCOSE SERPL-MCNC: 110 MG/DL (ref 70–99)
GLUCOSE UR STRIP.AUTO-MCNC: NEGATIVE MG/DL
HCT VFR BLD AUTO: 29.2 % (ref 40.1–51)
HGB BLD-MCNC: 9.3 G/DL (ref 13.7–17.5)
HGB UR QL STRIP.AUTO: ABNORMAL
HYALINE CASTS #/AREA URNS LPF: ABNORMAL /LPF
INR PPP: 1.5
KETONES UR STRIP.AUTO-MCNC: NEGATIVE MG/DL
LACTATE SERPL-SCNC: 1.8 MMOL/L (ref 0.4–2)
LDH SERPL L TO P-CCNC: 129 IU/L (ref 98–271)
LEUKOCYTE ESTERASE UR QL STRIP.AUTO: 3
MCH RBC QN AUTO: 32.3 PG (ref 28–33.2)
MCHC RBC AUTO-ENTMCNC: 31.8 G/DL (ref 32.2–36.5)
MCV RBC AUTO: 101.4 FL (ref 83–98)
MUCOUS THREADS URNS QL MICRO: ABNORMAL /LPF
NITRITE UR QL STRIP.AUTO: NEGATIVE
PH UR STRIP.AUTO: 6.5 [PH]
PLATELET # BLD AUTO: 111 K/UL (ref 150–350)
PMV BLD AUTO: 9.9 FL (ref 9.4–12.4)
POCT TEST: ABNORMAL
POTASSIUM SERPL-SCNC: 4.3 MEQ/L (ref 3.5–5.1)
PROT SERPL-MCNC: 7.2 G/DL (ref 6–8.2)
PROT UR QL STRIP.AUTO: 1
PROTHROMBIN TIME: 17.6 SEC (ref 12.2–14.5)
RBC # BLD AUTO: 2.88 M/UL (ref 4.5–5.8)
RBC #/AREA URNS HPF: ABNORMAL /HPF
SODIUM SERPL-SCNC: 138 MEQ/L (ref 136–145)
SP GR UR REFRACT.AUTO: 1.02
SQUAMOUS URNS QL MICRO: ABNORMAL /HPF
UROBILINOGEN UR STRIP-ACNC: 0.2 EU/DL
VIT B12 SERPL-MCNC: 938 PG/ML (ref 180–914)
WBC # BLD AUTO: 5.25 K/UL (ref 3.8–10.5)
WBC #/AREA URNS HPF: ABNORMAL /HPF
YEAST #/AREA URNS HPF: ABNORMAL /HPF

## 2025-04-02 PROCEDURE — 82607 VITAMIN B-12: CPT

## 2025-04-02 PROCEDURE — 63600000 HC DRUGS/DETAIL CODE: Mod: JZ | Performed by: EMERGENCY MEDICINE

## 2025-04-02 PROCEDURE — 63700000 HC SELF-ADMINISTRABLE DRUG

## 2025-04-02 PROCEDURE — 36100330 IR THORACENTESIS

## 2025-04-02 PROCEDURE — C1729 CATH, DRAINAGE: HCPCS

## 2025-04-02 PROCEDURE — 85027 COMPLETE CBC AUTOMATED: CPT | Performed by: PHYSICIAN ASSISTANT

## 2025-04-02 PROCEDURE — 87040 BLOOD CULTURE FOR BACTERIA: CPT | Performed by: EMERGENCY MEDICINE

## 2025-04-02 PROCEDURE — 85730 THROMBOPLASTIN TIME PARTIAL: CPT | Performed by: PHYSICIAN ASSISTANT

## 2025-04-02 PROCEDURE — 99285 EMERGENCY DEPT VISIT HI MDM: CPT | Mod: 25

## 2025-04-02 PROCEDURE — 71250 CT THORAX DX C-: CPT

## 2025-04-02 PROCEDURE — 21400000 HC ROOM AND CARE CCU/INTERMEDIATE

## 2025-04-02 PROCEDURE — 0W9B3ZZ DRAINAGE OF LEFT PLEURAL CAVITY, PERCUTANEOUS APPROACH: ICD-10-PCS | Performed by: STUDENT IN AN ORGANIZED HEALTH CARE EDUCATION/TRAINING PROGRAM

## 2025-04-02 PROCEDURE — 80053 COMPREHEN METABOLIC PANEL: CPT | Performed by: PHYSICIAN ASSISTANT

## 2025-04-02 PROCEDURE — 99223 1ST HOSP IP/OBS HIGH 75: CPT | Performed by: STUDENT IN AN ORGANIZED HEALTH CARE EDUCATION/TRAINING PROGRAM

## 2025-04-02 PROCEDURE — 87086 URINE CULTURE/COLONY COUNT: CPT | Performed by: EMERGENCY MEDICINE

## 2025-04-02 PROCEDURE — 70450 CT HEAD/BRAIN W/O DYE: CPT

## 2025-04-02 PROCEDURE — 81001 URINALYSIS AUTO W/SCOPE: CPT | Performed by: EMERGENCY MEDICINE

## 2025-04-02 PROCEDURE — 25000000 HC PHARMACY GENERAL: Performed by: PHYSICIAN ASSISTANT

## 2025-04-02 PROCEDURE — 63600000 HC DRUGS/DETAIL CODE

## 2025-04-02 PROCEDURE — 83615 LACTATE (LD) (LDH) ENZYME: CPT | Performed by: PHYSICIAN ASSISTANT

## 2025-04-02 PROCEDURE — 36415 COLL VENOUS BLD VENIPUNCTURE: CPT | Performed by: PHYSICIAN ASSISTANT

## 2025-04-02 PROCEDURE — 25800000 HC PHARMACY IV SOLUTIONS: Performed by: EMERGENCY MEDICINE

## 2025-04-02 PROCEDURE — 82746 ASSAY OF FOLIC ACID SERUM: CPT

## 2025-04-02 PROCEDURE — 99291 CRITICAL CARE FIRST HOUR: CPT | Performed by: STUDENT IN AN ORGANIZED HEALTH CARE EDUCATION/TRAINING PROGRAM

## 2025-04-02 PROCEDURE — 85610 PROTHROMBIN TIME: CPT | Performed by: PHYSICIAN ASSISTANT

## 2025-04-02 PROCEDURE — 83605 ASSAY OF LACTIC ACID: CPT | Performed by: EMERGENCY MEDICINE

## 2025-04-02 PROCEDURE — 71045 X-RAY EXAM CHEST 1 VIEW: CPT

## 2025-04-02 RX ORDER — PANTOPRAZOLE SODIUM 20 MG/1
20 TABLET, DELAYED RELEASE ORAL DAILY
Status: CANCELLED | OUTPATIENT
Start: 2025-04-02

## 2025-04-02 RX ORDER — FAMOTIDINE 20 MG/1
10 TABLET, FILM COATED ORAL DAILY
Status: CANCELLED | OUTPATIENT
Start: 2025-04-02

## 2025-04-02 RX ORDER — METOPROLOL SUCCINATE 25 MG/1
12.5 TABLET, EXTENDED RELEASE ORAL EVERY OTHER DAY
Status: ON HOLD | COMMUNITY

## 2025-04-02 RX ORDER — CLOPIDOGREL BISULFATE 75 MG/1
75 TABLET ORAL DAILY
Status: CANCELLED | OUTPATIENT
Start: 2025-04-02

## 2025-04-02 RX ORDER — CALCIUM CARBONATE 200(500)MG
200 TABLET,CHEWABLE ORAL DAILY
Status: DISCONTINUED | OUTPATIENT
Start: 2025-04-02 | End: 2025-04-04 | Stop reason: HOSPADM

## 2025-04-02 RX ORDER — DIMETHICONE 13 MG/ML
425 LOTION TOPICAL DAILY
Status: DISCONTINUED | OUTPATIENT
Start: 2025-04-02 | End: 2025-04-02

## 2025-04-02 RX ORDER — IBUPROFEN 200 MG
16-32 TABLET ORAL AS NEEDED
Status: DISCONTINUED | OUTPATIENT
Start: 2025-04-02 | End: 2025-04-04 | Stop reason: HOSPADM

## 2025-04-02 RX ORDER — LATANOPROST 50 UG/ML
1 SOLUTION/ DROPS OPHTHALMIC NIGHTLY
Status: DISCONTINUED | OUTPATIENT
Start: 2025-04-02 | End: 2025-04-04 | Stop reason: HOSPADM

## 2025-04-02 RX ORDER — MELATONIN 5 MG
10 CAPSULE ORAL NIGHTLY
Status: DISCONTINUED | OUTPATIENT
Start: 2025-04-02 | End: 2025-04-04 | Stop reason: HOSPADM

## 2025-04-02 RX ORDER — ASPIRIN 81 MG/1
81 TABLET ORAL DAILY
Status: DISCONTINUED | OUTPATIENT
Start: 2025-04-02 | End: 2025-04-04 | Stop reason: HOSPADM

## 2025-04-02 RX ORDER — CALCIUM CARBONATE 200(500)MG
200 TABLET,CHEWABLE ORAL DAILY
Status: CANCELLED | OUTPATIENT
Start: 2025-04-02

## 2025-04-02 RX ORDER — TORSEMIDE 20 MG/1
20 TABLET ORAL 2 TIMES DAILY
Status: DISCONTINUED | OUTPATIENT
Start: 2025-04-02 | End: 2025-04-04 | Stop reason: HOSPADM

## 2025-04-02 RX ORDER — FERROUS SULFATE 325(65) MG
325 TABLET ORAL
Status: DISCONTINUED | OUTPATIENT
Start: 2025-04-03 | End: 2025-04-04 | Stop reason: HOSPADM

## 2025-04-02 RX ORDER — LEVOTHYROXINE SODIUM 100 UG/1
100 TABLET ORAL
Status: DISCONTINUED | OUTPATIENT
Start: 2025-04-03 | End: 2025-04-04 | Stop reason: HOSPADM

## 2025-04-02 RX ORDER — ASPIRIN 81 MG/1
81 TABLET ORAL DAILY
Status: CANCELLED | OUTPATIENT
Start: 2025-04-02

## 2025-04-02 RX ORDER — POTASSIUM CHLORIDE 20 MEQ/1
40 TABLET, EXTENDED RELEASE ORAL 2 TIMES DAILY
Status: DISCONTINUED | OUTPATIENT
Start: 2025-04-02 | End: 2025-04-04

## 2025-04-02 RX ORDER — LIDOCAINE HYDROCHLORIDE 10 MG/ML
INJECTION, SOLUTION INFILTRATION; PERINEURAL
Status: COMPLETED | OUTPATIENT
Start: 2025-04-02 | End: 2025-04-02

## 2025-04-02 RX ORDER — DEXTROSE 40 %
15-30 GEL (GRAM) ORAL AS NEEDED
Status: DISCONTINUED | OUTPATIENT
Start: 2025-04-02 | End: 2025-04-04 | Stop reason: HOSPADM

## 2025-04-02 RX ORDER — CLOPIDOGREL BISULFATE 75 MG/1
75 TABLET ORAL DAILY
Status: DISCONTINUED | OUTPATIENT
Start: 2025-04-03 | End: 2025-04-04

## 2025-04-02 RX ORDER — BUPROPION HYDROCHLORIDE 150 MG/1
150 TABLET ORAL DAILY
Status: CANCELLED | OUTPATIENT
Start: 2025-04-02

## 2025-04-02 RX ORDER — ENOXAPARIN SODIUM 100 MG/ML
40 INJECTION SUBCUTANEOUS
Status: DISCONTINUED | OUTPATIENT
Start: 2025-04-02 | End: 2025-04-02

## 2025-04-02 RX ORDER — HEPARIN SODIUM 5000 [USP'U]/ML
5000 INJECTION, SOLUTION INTRAVENOUS; SUBCUTANEOUS EVERY 8 HOURS
Status: DISCONTINUED | OUTPATIENT
Start: 2025-04-02 | End: 2025-04-04 | Stop reason: HOSPADM

## 2025-04-02 RX ORDER — CARBOXYMETHYLCELLULOSE SODIUM 5 MG/ML
1 SOLUTION/ DROPS OPHTHALMIC
Status: DISCONTINUED | OUTPATIENT
Start: 2025-04-02 | End: 2025-04-04 | Stop reason: HOSPADM

## 2025-04-02 RX ORDER — ESCITALOPRAM OXALATE 5 MG/1
5 TABLET ORAL DAILY
Status: ON HOLD | COMMUNITY
End: 2025-04-04 | Stop reason: ENTERED-IN-ERROR

## 2025-04-02 RX ORDER — TORSEMIDE 20 MG/1
10 TABLET ORAL EVERY MORNING
Status: CANCELLED | OUTPATIENT
Start: 2025-04-02

## 2025-04-02 RX ORDER — TAMSULOSIN HYDROCHLORIDE 0.4 MG/1
0.4 CAPSULE ORAL EVERY MORNING
Status: DISCONTINUED | OUTPATIENT
Start: 2025-04-02 | End: 2025-04-04 | Stop reason: HOSPADM

## 2025-04-02 RX ORDER — VIT C/E/ZN/COPPR/LUTEIN/ZEAXAN 250MG-90MG
500 CAPSULE ORAL EVERY MORNING
Status: DISCONTINUED | OUTPATIENT
Start: 2025-04-02 | End: 2025-04-04 | Stop reason: HOSPADM

## 2025-04-02 RX ORDER — METHENAMINE HIPPURATE 1000 MG/1
1 TABLET ORAL
Status: DISCONTINUED | OUTPATIENT
Start: 2025-04-03 | End: 2025-04-04 | Stop reason: HOSPADM

## 2025-04-02 RX ORDER — THIAMINE HCL 100 MG
100 TABLET ORAL EVERY MORNING
Status: DISCONTINUED | OUTPATIENT
Start: 2025-04-02 | End: 2025-04-04 | Stop reason: HOSPADM

## 2025-04-02 RX ORDER — DEXTROSE 50 % IN WATER (D50W) INTRAVENOUS SYRINGE
25 AS NEEDED
Status: DISCONTINUED | OUTPATIENT
Start: 2025-04-02 | End: 2025-04-04 | Stop reason: HOSPADM

## 2025-04-02 RX ORDER — FINASTERIDE 5 MG/1
5 TABLET, FILM COATED ORAL DAILY
Status: DISCONTINUED | OUTPATIENT
Start: 2025-04-02 | End: 2025-04-04 | Stop reason: HOSPADM

## 2025-04-02 RX ADMIN — POTASSIUM CHLORIDE 40 MEQ: 20 TABLET, EXTENDED RELEASE ORAL at 23:13

## 2025-04-02 RX ADMIN — TORSEMIDE 20 MG: 20 TABLET ORAL at 23:13

## 2025-04-02 RX ADMIN — CARBOXYMETHYLCELLULOSE SODIUM 1 DROP: 5 SOLUTION/ DROPS OPHTHALMIC at 23:44

## 2025-04-02 RX ADMIN — HEPARIN SODIUM 5000 UNITS: 5000 INJECTION, SOLUTION INTRAVENOUS; SUBCUTANEOUS at 23:13

## 2025-04-02 RX ADMIN — THIAMINE HCL TAB 100 MG 100 MG: 100 TAB at 23:13

## 2025-04-02 RX ADMIN — SODIUM CHLORIDE 500 ML: 9 INJECTION, SOLUTION INTRAVENOUS at 18:35

## 2025-04-02 RX ADMIN — ASPIRIN 81 MG: 81 TABLET, COATED ORAL at 23:13

## 2025-04-02 RX ADMIN — LIDOCAINE HYDROCHLORIDE 10 ML: 10 INJECTION, SOLUTION INFILTRATION; PERINEURAL at 09:34

## 2025-04-02 RX ADMIN — CYANOCOBALAMIN TAB 500 MCG 500 MCG: 500 TAB at 23:44

## 2025-04-02 RX ADMIN — FINASTERIDE 5 MG: 5 TABLET, FILM COATED ORAL at 23:13

## 2025-04-02 RX ADMIN — ERTAPENEM 1 G: 1 INJECTION INTRAMUSCULAR; INTRAVENOUS at 19:13

## 2025-04-02 RX ADMIN — TAMSULOSIN HYDROCHLORIDE 0.4 MG: 0.4 CAPSULE ORAL at 23:14

## 2025-04-02 ASSESSMENT — ENCOUNTER SYMPTOMS
CHEST TIGHTNESS: 0
HEADACHES: 0
COUGH: 0
DYSURIA: 0
CHILLS: 1
FATIGUE: 0
ABDOMINAL PAIN: 0
FEVER: 0
CONSTIPATION: 0
LIGHT-HEADEDNESS: 0
SPEECH DIFFICULTY: 1
BLOOD IN STOOL: 0
CONFUSION: 0
DIARRHEA: 0
AGITATION: 0
FREQUENCY: 1
SHORTNESS OF BREATH: 0
PALPITATIONS: 0
DIFFICULTY URINATING: 1

## 2025-04-02 ASSESSMENT — COGNITIVE AND FUNCTIONAL STATUS - GENERAL
WALKING IN HOSPITAL ROOM: 1 - TOTAL
MOVING TO AND FROM BED TO CHAIR: 1 - TOTAL
STANDING UP FROM CHAIR USING ARMS: 1 - TOTAL
CLIMB 3 TO 5 STEPS WITH RAILING: 1 - TOTAL

## 2025-04-02 NOTE — PROGRESS NOTES
Mr. Elena is a 89 year old male seen I noutpatient IR known well to me and my team for a routine thoracentesis. Only complaint today is for dyspnea. Thoracentesis performed. Also noted that he was having some garbled speech prior to the procedure but son present and states that this has been going on for 2-3 days and has happened in the past when he needs to be drained. Post procedure his speech seemed more garbled than pre procedure, brief neuro exam with no other focal deficit. Called PCP, Covering CRNP took call and discussed case discussed, advised ED for evaluation. Saw patient back in holding area, now with nausea, VSS and no drop in BP. Speech now impossible to understand. Escalated and stroke alert called. Team arrived promptly and CT head ordered. CXR has not been performed but patient with no signs or symptoms of PTX, Satting 97% on RA and will delay CXR and prioritize head CT.

## 2025-04-02 NOTE — ASSESSMENT & PLAN NOTE
Hx of prior atrial fibrillation  Home regimen Not on AC due to history of GI bleed.  Follows with Dr. Roshan Jeffrey at [facility]  [id number] score: 5 points (age +2, CHF history +1, HTN history +1, prior NSTEMI +1)     - monitor on tele  -Heparin for DVT

## 2025-04-02 NOTE — ASSESSMENT & PLAN NOTE
TSH 6.26 on 05/03/2024.  Home medication: synthroid 100mcg   - Repeat TSH  - Continue home medication

## 2025-04-02 NOTE — H&P
Internal Medicine  History & Physical        CHIEF COMPLAINT   Altered Mental Status     HISTORY OF PRESENT ILLNESS      This is a 89M PMHx of HFrEF 45-50% (2024), nonrheumatic torrential TR, AF not on AC (prior GI bleeds), HTN, hx diverticulitis s/p colostomy, and recurrent pleural effusions s/c to CHF requiring repeated L-sided thoracentesis, history of Justin's gangrene with SGLT2 use, CAD s/p CABG 1996 (ASA only), CKD, hypothyroidism on synthroid, recurrent GIB thought to be s/c to diverticular bleeds s/p colostomy with rectal stump. For the past several days he was feeling more dizzy with shortness of breath that improved after the thoracentesis. He was at IR getting a thoracentesis this morning with 1L of pleural fluid successfully drained when getting more altered to Aox1 and a stroke alert was called.    Glucose 115, His CT head was negative for acute stroke. His son states that he is not back to baseline and that he has had this kind of change in mental status previously when he had a UTI. He tried having Gram crackers and soda, then started dry heaving that brought up sputum.  Of note, micro history relevant for previous EBSL E. Coli + UTI resistant to multiple cephalosporins, bactrim and flouroquinolones susceptible to ertapenem in October 2024.    -New Meds:off metoprolol, started on flomax    -Alcohol: once in a while -wine  -Tobacco: none  -Illicit Drugs: none    -Lives at: home with full time caregiver on palliative bridge and does not ambulates . Patient is unable to perform ADL's without concern.  -Sick contacts: none  -Recent Travel: none    CODE Status: DNR    ED Summary:   Vitals: T97.4 HR 66 /62 97% on RA  Labs: BMP Na 138, 4.3, BUN 30, Cr 1.6 (BS) Albumin 2.8 Anion gap 7,  CBC: wbc 5.25, Hgb 9.3, .4,   UA:+3 LE, +1 protein cloudy TNTC WBC +1 bacteria 0-4 RBC and 0-2 hylanie casts  Imaging: CT head  Probable chronic small vessel ischemic  changes of the white matter.  Intracranial atherosclerosis.  Cerebral volume loss with enlargement of the ventricles and sulci.    CT chest Bilateral pleural effusions both of which are smaller as compared to CT exam of February 2025.     Bilateral lower lobe consolidative opacities similar to somewhat improved since  prior exam.  Atelectasis versus pneumonia.     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)     Coronary artery disease     Disease of thyroid gland     Will catheter in place     6/25 not at present    GI (gastrointestinal bleed)     Hypertension     Myocardial infarction (CMS/HCC)     Orthostatic hypotension     TIA (transient ischemic attack)        PSHx:  Past Surgical History   Procedure Laterality Date    cataract extraction right eye with implant and limbal relaxing incision Right 7/18/2024    Performed by Hayder Moses MD at  OR [facility]    Cataract extraction w/ intraocular lens implant Left     Cataract extraction with LRI and anterior vitrectomy left eye Left 11/16/2023    Performed by Hayder Moses MD at  OR [facility]    Cholecystectomy      Colostomy      COLOSTOMY LAPAROSCOPIC N/A 8/3/2022    Performed by Mat Bryant MD at [facility] [place] [facility]    Coronary artery bypass graft      DIAGNOSTIC FLEXIBLE SIGMOIDOSCOPY WITH COLLECTION OF SPECIMEN BY WASHING N/A 11/27/2024    Performed by David Holcomb MD at [facility] [place] [facility]    DIAGNOSTIC FLEXIBLE SIGMOIDOSCOPY WITH COLLECTION OF SPECIMEN BY WASHING N/A 10/18/2024    Performed by Maine Lopez MD at [facility] [place] [facility]    DIAGNOSTIC UPPER  GASTROINTESTINAL ENDOSCOPY WITH COLLECTION OF SPECIMEN BY WASHING N/A 11/27/2024    Performed by David Holcomb MD at [facility] [place] [facility]    FLEXIBLE SIGMOIDOSCOPY WITH BIOPSY N/A 10/23/2024    Performed by David Holcomb MD at [facility] [place] [facility]    INCISION AND DRAINAGE WITH DEBRIDMENT OF BUTTOCK, AND PERINEUM N/A 8/2/2022    Performed by Mat Bryant MD at [facility] [place] [facility]    Joint replacement Left     Knee    RIGHT HEART CATH N/A 8/4/2022    Performed by Cristal Lacey MD at [facility] [place] [facility]/EP    Thyroidectomy      WASHOUT OF PERINEAL WOUND, COLONIC LAVAGE N/A 8/3/2022    Performed by Mat Bryant MD at [facility] [place] [facility]       PCP:   Zachery Hernandez MD    MEDICATIONS      Prior to Admission medications    Medication Sig Start Date End Date Taking? Authorizing Provider   amoxicillin-pot clavulanate (AUGMENTIN) 875-125 mg per tablet Take 1 tablet by mouth 2 (two) times a day. 3/6/25   Clay Chambers MD   aspirin 81 mg enteric coated tablet Take 81 mg by mouth daily. 9 am    Clay Chambers MD   bimatoprost (LUMIGAN) 0.01 % ophthalmic drops Administer 1 drop into the left eye nightly.    Clay Chambers MD   buPROPion XL (WELLBUTRIN XL) 150 mg 24 hr tablet Take 150 mg by mouth daily. 2/14/25   Clay Chambers MD   calcium, as carbonate, (TUMS) 200 mg calcium (500 mg) chewable tablet Take 1 tablet by mouth daily. 11 am    Clay Chambers MD   carboxymethylcellulose (REFRESH PLUS) 0.5 % dropperette Administer 1 drop into both eyes every2 (two) hours while awake.    Clay Chambers MD   clopidogreL (PLAVIX) 75 mg tablet Take 75 mg by mouth daily. 2/10/25   Clay Chambers MD   cranberry extract 425 mg capsule Take 425 mg by mouth daily. 5 pm    Clay Chambers MD   cyanocobalamin (VITAMIN B12) 500 mcg tablet Take 500 mcg by mouth every morning. 9 am    Clay Chambers MD   famotidine (PEPCID) 10 mg tablet Take 10 mg by mouth daily. 11 am    Clay Chambers  MD John   ferrous sulfate 325 mg (65 mg iron) tablet Take 65 mg by mouth daily with breakfast. 11 am    Clay Chambers MD   finasteride (PROSCAR) 5 mg tablet Take 5 mg by mouth daily. 9 am    Clay Chambers MD   levothyroxine (SYNTHROID) 100 mcg tablet Take 100 mcg by mouth daily before breakfast. 7 am    Clay Chambers MD   melatonin 10 mg capsule Take 10 mg by mouth nightly. 9 pm    Clay Chambers MD   methenamine (HIPREX) 1 gram tablet Take 1 g by mouth daily before lunch. 11 am    Clay Chambers MD   omeprazole (PriLOSEC) 20 mg capsule Take 20 mg by mouth daily. 11 am    Clay Chambers MD   potassium chloride (KLOR-CON M) 20 mEq CR tablet Take 10 mEq by mouth 2 (two) times a day. 9 am and 5 pm    Clay Chambers MD   tamsulosin (FLOMAX) 0.4 mg capsule Take 0.4 mg by mouth every morning. 9 am    Clay Chambers MD   thiamine HCl (VITAMIN B1) 100 mg tablet Take 100 mg by mouth every morning. 9 am    Clay Chambers MD   torsemide (DEMADEX) 10 mg tablet Take 10 mg by mouth every morning. 9 am    Clya Chambers MD   amLODIPine (NORVASC) 2.5 mg tablet amlodipine 2.5 mg tablet  7/22/22  John Chambers MD   apixaban (ELIQUIS) 2.5 mg tablet   7/22/22  John Chambers MD   hydrochlorothiazide (MICROZIDE) 12.5 mg capsule hydrochlorothiazide 12.5 mg capsule  7/22/22  John Chambers MD       Home medications were personally reviewed.    ALLERGIES      Jardiance [empagliflozin]    FAMILY HISTORY      No family history on file.    SOCIAL HISTORY      Social History     Socioeconomic History    Marital status:    Tobacco Use    Smoking status: Never    Smokeless tobacco: Never   Vaping Use    Vaping status: Never Used   Substance and Sexual Activity    Alcohol use: Not Currently     Comment: social    Drug use: Never    Sexual activity: Defer     Social Drivers of Health     Financial  Resource Strain: Low Risk  (8/4/2023)    Overall Financial Resource Strain (CARDIA)     Difficulty of Paying Living Expenses: Not hard at all   Food Insecurity: No Food Insecurity (4/2/2025)    Hunger Vital Sign     Worried About Running Out of Food in the Last Year: Never true     Ran Out of Food in the Last Year: Never true   Transportation Needs: No Transportation Needs (2/19/2025)    PRAPARE - Transportation     Lack of Transportation (Medical): No     Lack of Transportation (Non-Medical): No   Housing Stability: Low Risk  (2/19/2025)    Housing Stability Vital Sign     Unable to Pay for Housing in the Last Year: No     Number of Times Moved in the Last Year: 0     Homeless in the Last Year: No       REVIEW OF SYSTEMS      Review of Systems   Constitutional:  Positive for chills. Negative for fatigue and fever.   HENT:  Negative for hearing loss.    Respiratory:  Negative for cough, chest tightness and shortness of breath.    Cardiovascular:  Negative for chest pain and palpitations.   Gastrointestinal:  Negative for abdominal pain, blood in stool, constipation and diarrhea.   Genitourinary:  Positive for difficulty urinating and frequency. Negative for dysuria.   Neurological:  Positive for speech difficulty. Negative for light-headedness and headaches.   Psychiatric/Behavioral:  Negative for agitation and confusion.        PHYSICAL EXAMINATION      Temp:  [36.3 °C (97.4 °F)-36.5 °C (97.7 °F)] 36.4 °C (97.6 °F)  Heart Rate:  [52-67] 55  Resp:  [12-37] 17  BP: ()/(50-79) 113/60  Body mass index is 22.76 kg/m².    Physical Exam  Constitutional:       Appearance: Normal appearance. He is not ill-appearing.      Comments: Cachectic    HENT:      Head: Normocephalic and atraumatic.   Eyes:      Pupils: Pupils are equal, round, and reactive to light.   Cardiovascular:      Rate and Rhythm: Normal rate.      Comments: +JVD   Pulmonary:      Effort: Pulmonary effort is normal.      Breath sounds: Rhonchi (left  side) present. No wheezing.   Abdominal:      General: There is no distension.      Palpations: Abdomen is soft.      Tenderness: There is no abdominal tenderness.      Hernia: No hernia is present.      Comments: Ostomy bag in place   Musculoskeletal:         General: No swelling, deformity or signs of injury.      Right lower leg: No edema.      Left lower leg: No edema.   Skin:     General: Skin is warm and dry.   Neurological:      General: No focal deficit present.      Mental Status: He is alert and oriented to person, place, and time.      Comments: Dysarthria  Cranial nerves 1-12 intact wth 5/5 muscle strength         LABS / IMAGING / EKG        Labs:  As above    Microbiology Data personally reviewed:  Microbiology Results       ** No results found for the last 720 hours. **            Imaging personally reviewed(does not include unread studies):  IR THORACENTESIS  Result Date: 4/2/2025  IMPRESSION: Successful ultrasound-guided left thoracentesis with 1 L removed and discarded. I certify that I have personally reviewed this examination and agree with Betsy Benitez's report. Elio Espinoza M.D.    CT CHEST WITHOUT IV CONTRAST  Result Date: 4/2/2025  IMPRESSION: Bilateral pleural effusions both of which are smaller as compared to CT exam of February 2025. Bilateral lower lobe consolidative opacities similar to somewhat improved since prior exam.  Atelectasis versus pneumonia. There are stable compression fractures of the T5 and L1 vertebral bodies. Stable cardiomegaly.    X-RAY CHEST 1 VIEW  Result Date: 4/2/2025  IMPRESSION: See above.     CT HEAD STROKE ALERT WITHOUT IV CONTRAST  Result Date: 4/2/2025  IMPRESSION: 1. No acute intracranial abnormality. Finding:    Stroke alert   Acuity: Critical  Status:  CLOSED Critical read back was performed. These findings were communicated by Dr. Ludwig Mena and acknowledged by Dr. Maldonado at 10:20 AM on 4/2/2025.     IR THORACENTESIS  Result Date: 3/14/2025  IMPRESSION:  Successful ultrasound-guided left thoracentesis with 800 mL removed and discarded. I certify that I have personally reviewed this examination and agree with Betsy Benitez's report. Elio Espinoza M.D.    X-RAY CHEST 1 VIEW  Result Date: 3/14/2025  IMPRESSION: 1.  No pneumothorax status post left-sided thoracentesis 2.  Residual left-sided pleural fluid with left lower lobe atelectasis and or consolidation with opacification within the left midlung zone.     X-RAY CHEST 1 VIEW  Result Date: 3/14/2025  IMPRESSION: 1.  Small residual left-sided pleural effusion at the left lung base and along lateral aspect left hemithorax with left lower lobe atelectasis and or consolidation. 2.  No radiographically evident pneumothorax 3.  Trace right-sided pleural effusion with likely atelectasis the right lung base     IR THORACENTESIS  Result Date: 3/5/2025  IMPRESSION: Successful ultrasound-guided left thoracentesis with 1 L removed and discarded. I certify that I have personally reviewed this examination and agree with Betsy Benitez's report. Jostin Miller M.D.    X-RAY CHEST 1 VIEW  Result Date: 3/5/2025  IMPRESSION: No pneumothorax seen.      ECG/Telemetry  Pending       VTE Assessment: Padua    VTE Prophylaxis: Current anticoagulants:  [Provider Managed Hold] enoxaparin (LOVENOX) syringe 40 mg, subcutaneous, Daily (6p)      Palliative Care Screen:    Code Status: DNR (A.N.D.)  Estimated discharge date: 4/6/2025  Discussed advanced care planning. DNR        Assessment & Plan  Dysarthria  Complicated UTI (urinary tract infection)  Hx of previous EBSL E. Coli + UTI resistant to multiple cephalosporins, bactrim and fluroquinolines in October 2024  Pt complaining of polyuria urinary frequency  UA with cloudy color, +3 LE, WBC TNTC, 1+ bacteria    No CVA tenderness on exam  S/p 500mg NS bolus and 1g ertapenem in the ED    - F/u ucx  - Continue ertapenem narrow with sens/spec----deescalate to Augmentin if same as before  - Consider CT  A/P with and without contrast if pt were to decompensate, develop oliguria to r/o pyelonephritis, calculi, abscesses, gas-forming infections, obstruction.  -speech consult for dysarthria  HFrEF (heart failure with reduced ejection fraction) (CMS/[facility])  Follows with Dr. Jeffrey at [facility]  Causes recurrent left sided plural effusions requiring serial thoracentesis  TTE 09/2024 EF 40 to 45%, abnormal septal motion due to RV volume overload, unable to assess diastolic function. RV massively dilated with severely decreased systolic function, torrential tricuspid regurgitation  Maintained on torsemide 20 mg daily, SGLT2 contraindicated 2/2 history of Denise's gangrene  Dry weight roughly 164 LBS, currently presenting on RA and appears euvolemic with chronic distended neck veins.  CHF has been complicated by pleural effusions, saw Dr. Gregg outpatient no plan for PLeuRx     - Continue torsemide  - Daily weights, strict I's/O  -in AM ask IR about more fluid removal this admission  CAD (coronary artery disease)  History of CABG in 1996  No recent stress test or cath available on file  Previously on plavix and aspirin; Plavix had been discontinued over the last several months due to recurrent GI bleeds.      - Continue aspirin 81 mg daily      Hypertension  Normotensive on admission  Home medication: torsemide 20 mg daily    - Continue home medications  Hypothyroidism  TSH 6.26 on 05/03/2024.  Home medication: synthroid 100mcg   - Repeat TSH  - Continue home medication  Colostomy present on admission (CMS/[facility])  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   Extensive GIB hx also  Ostomy bag intact on examination   No blood noted in colostomy bag    - Continue to monitor    A-fib (CMS/HCC)  Hx of prior atrial fibrillation  Home regimen Not on AC due to history of GI bleed.  Follows with Dr. Roshan Jeffrey at [facility]  [id number] score: 5 points (age +2, CHF  history +1, HTN history +1, prior NSTEMI +1)     - monitor on tele  -Heparin for DVT   Macrocytic anemia  Hb on presentation 9.3, Baseline Hb ~8-9  .4   - F/u B12, folate levels    ATTENDING DOCUMENTATION  ALSO SEE ATTENDING ATTESTATION SECTION OF NOTE

## 2025-04-02 NOTE — SIGNIFICANT EVENT
"STROKE ALERT     Stroke alert was called at 09:50 on Samy Elena for slurred speech noticed by nursing staff.    SUBJECTIVE:   Time patient last known well: 2-3 days ago (25    Patient evaluated in IR holding area following outpatient thoracocentesis. On DAPT.    OBJECTIVE:    VITAL SIGNS:   Blood pressure (!) 107/59, pulse 64, resp. rate 20, SpO2 97%.    PHYSICAL EXAM:   Mental Status:  {PE Mental Status:02092}  Speech:  dysarthria noted   Vision: {SYMPTOMS; VISION:21667}    Cranial nerves: {CN Exam, limited:90770}   Muscle strength/Extremities:  {NEURO MOTOR:51468}     Lungs:  {Exam; lun::\"clear to auscultation bilaterally\"}  Cardiac: {CARDIAC EXAM:54052}    NIH STROKE SCALE:   Interval: Baseline  Time: 9:59 AM  Person Administering Scale: Tj Ortez MD    Administer stroke scale items in the order listed. Record performance in each category after each subscale exam. Do not go back and change scores. Follow directions provided for each exam technique. Scores should reflect what the patient does, not what the clinician thinks the patient can do. The clinician should record answers while administering the exam and work quickly. Except where indicated, the patient should not be coached (i.e., repeated requests to patient to make a special effort).      1a  Level of consciousness: 0=alert; keenly responsive   1b. LOC questions:  0=Performs both tasks correctly   1c. LOC commands: 0=Performs both tasks correctly   2.  Best Gaze: 0=normal   3. Visual: 0=No visual loss   4. Facial Palsy: 0=Normal symmetric movement   5a. Motor left arm: 0=No drift, limb holds 90 (or 45) degrees for full 10 seconds   5b.  Motor right arm: 0=No drift, limb holds 90 (or 45) degrees for full 10 seconds   6a. motor left le=No drift, limb holds 90 (or 45) degrees for full 10 seconds   6b  Motor right le=No drift, limb holds 90 (or 45) degrees for full 10 seconds   7. Limb Ataxia: 0=Absent   8.  Sensory: " 0=Normal; no sensory loss   9. Best Language:  1=Mild to moderate aphasia; some obvious loss of fluency or facility of comprehension without significant limitation on ideas expressed or form of expression.   10. Dysarthria: 2=Severe; patient speech is so slurred as to be unintelligible in the absence of or our of proportion to any dysphagia, or is mute/anarthric   11. Extinction and Inattention: 0=No abnormality   12. Distal motor function: 0=Normal    Total:   3        POC GLUCOSE:    POCT Bedside Glucose   Date Value Ref Range Status   02/04/2025 172 (H) 70 - 99 mg/dL Final     Comment:     : DIANNE MICHEL (NR)       CAT SCAN RESULT:  Results for orders placed during the hospital encounter of 02/16/25    CT HEAD WITHOUT IV CONTRAST    Narrative  CLINICAL HISTORY:  ams; eval for cva/other pathology    CT DOSE:  One or more dose reduction techniques (e.g. automated exposure  control, adjustment of the mA and/or kV according to patient size, use of  iterative reconstruction technique) utilized for this examination.    COMMENT:    Noncontrast CT scan of the brain was performed from the skull base through the  higher convexities.  Sagittal and coronal reconstructions were performed.    Comparison date: September 23, 2024.    The ventricles are normal in size and position for the patient's age. There is  generalized cerebral atrophy and small vessel ischemic change. Small lacune left  insula There is no mass effect or midline shift.  There is no abnormal  intra-axial or extra axial blood or fluid collections.  There is no evidence of  acute infarction. The visualized paranasal sinuses are clear. The osseous  structures are normal.    Impression  IMPRESSION:  Generalized atrophy and small vessel ischemic change..      NEUROLOGY CONTACTED: ***    Patient ruled in for CVA: {Response; yes/no/na:63}  Patient candidate for thrombolytic therapy: {Response; yes/no/na, tpa:32279}  Patient candidate for  neuro-intervention/thrombectomy: {Response; yes/no neuro-intervention/thrombectomy:030229}     Stroke order set initiated: {Response; yes/no/na:63}  Patient transferred to stroke floor: {Response; yes/no/na:63}    ASSESSMENT/PLAN:  ***    - CBC, CMP  - CT Head

## 2025-04-02 NOTE — SIGNIFICANT EVENT
"STROKE ALERT     Stroke alert was called at 09:50 on Samy Elena for slurred speech noticed by nursing staff.    SUBJECTIVE:     The patient is feeling well and tolerated the procedure well. He was not given any medications for sedation. His son was available at bedside and he has had long standing dysarthria. He denies a prior history of stroke. After the procedure, his dysarthria worsened for which a stroke alert was called.    He was alert and oriented. He did not have any cranial nerve deficits except a slight lip asymmetry. He had no tongue deviation and had good strength. Motor exam with 5/5 strength.     OBJECTIVE:    VITAL SIGNS:   Blood pressure (!) 110/56, pulse (!) 52, temperature 36.4 °C (97.6 °F), resp. rate 16, height 1.854 m (6' 1\"), weight 78.2 kg (172 lb 8 oz), SpO2 94%.    PHYSICAL EXAM:   Mental Status:  Alert, O x 3  Speech:  dysarthria noted   Vision: negative  Cranial nerves: a full range of ocular motion without diplopia   Muscle strength/Extremities:  Motor exam shows normal stength throughout all 4 extremities.     Lungs:  clear to auscultation bilaterally  Cardiac: Heart sounds are normal.  Regular rate and rhythm without murmur, gallop or rub.    NIH STROKE SCALE:   Interval: Baseline  Time: 4:15 PM  Person Administering Scale: Akbar Maldonado MD    Administer stroke scale items in the order listed. Record performance in each category after each subscale exam. Do not go back and change scores. Follow directions provided for each exam technique. Scores should reflect what the patient does, not what the clinician thinks the patient can do. The clinician should record answers while administering the exam and work quickly. Except where indicated, the patient should not be coached (i.e., repeated requests to patient to make a special effort).      1a  Level of consciousness: 0=alert; keenly responsive   1b. LOC questions:  0=Performs both tasks correctly   1c. LOC commands: 0=Performs both " tasks correctly   2.  Best Gaze: 0=normal   3. Visual: 0=No visual loss   4. Facial Palsy: 0=Normal symmetric movement   5a. Motor left arm: 0=No drift, limb holds 90 (or 45) degrees for full 10 seconds   5b.  Motor right arm: 0=No drift, limb holds 90 (or 45) degrees for full 10 seconds   6a. motor left le=No drift, limb holds 90 (or 45) degrees for full 10 seconds   6b  Motor right le=No drift, limb holds 90 (or 45) degrees for full 10 seconds   7. Limb Ataxia: 0=Absent   8.  Sensory: 0=Normal; no sensory loss   9. Best Language:  1=Mild to moderate aphasia; some obvious loss of fluency or facility of comprehension without significant limitation on ideas expressed or form of expression.   10. Dysarthria: 2=Severe; patient speech is so slurred as to be unintelligible in the absence of or our of proportion to any dysphagia, or is mute/anarthric   11. Extinction and Inattention: 0=No abnormality   12. Distal motor function: 0=Normal    Total:   3        POC GLUCOSE:    POCT Bedside Glucose   Date Value Ref Range Status   2025 115 (H) 70 - 99 mg/dL Final     Comment:     : RAYMOND ERICKSON       CAT SCAN RESULT:  Results for orders placed during the hospital encounter of 25    CT HEAD WITHOUT IV CONTRAST    Narrative  CLINICAL HISTORY:  ams; eval for cva/other pathology    CT DOSE:  One or more dose reduction techniques (e.g. automated exposure  control, adjustment of the mA and/or kV according to patient size, use of  iterative reconstruction technique) utilized for this examination.    COMMENT:    Noncontrast CT scan of the brain was performed from the skull base through the  higher convexities.  Sagittal and coronal reconstructions were performed.    Comparison date: 2024.    The ventricles are normal in size and position for the patient's age. There is  generalized cerebral atrophy and small vessel ischemic change. Small lacune left  insula There is no mass effect or midline  shift.  There is no abnormal  intra-axial or extra axial blood or fluid collections.  There is no evidence of  acute infarction. The visualized paranasal sinuses are clear. The osseous  structures are normal.    Impression  IMPRESSION:  Generalized atrophy and small vessel ischemic change..      NEUROLOGY CONTACTED: Case was discussed with Neurology     Patient ruled in for CVA: yes  Patient candidate for thrombolytic therapy: yes  Patient candidate for neuro-intervention/thrombectomy: yes     Stroke order set initiated: yes  Patient transferred to stroke floor: Patient transferred to the ED to be admitted     ASSESSMENT/PLAN:    This is a 89 y.o. 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     He presented for an outpatient visit for recurrent left effusion and SOLIS for left thoracentesis. He has had this procedure done multiple times in the past without complications.     CT Head Stroke Alert with no acute intracranial abnormality.     Patient will present to the ED for evaluation.

## 2025-04-02 NOTE — DISCHARGE INSTRUCTIONS
Mr. VERONICA SPRINGER,    You were admitted to [facility] on 4/2/2025 for garbled speech and confusion after your thoracentesis (procedure to remove fluid around your lung). We believe the speech and mental status changes were due to the fluid buildup. In addition, your speech is typically worse in the early morning and late evening, which may have a component of age-related delirium. We also initially suspected you had a urinary tract infection and started you on broad spectrum antibiotics. However, your urine culture resulted negative; thus, we discontinued the antibiotics. We also performed an ultrasound scan of your kidneys and bladder, it showed chronic urinary obstruction, but no new changes.     We recommend you continue to get your regularly scheduled outpatient thoracentesis for fluid removal as recommended by your doctor.     You will continue to have home visits by our palliative care team on what we call a palliative bridge. You are able to transition to hospice at any time through the palliative care team.      Medications:  -  Continue your medications as prescribed. We are not starting any new medications.    Follow up:  - Follow up with your primary care physician (PCP) in 1-2 weeks.  - Follow up with home-based palliative care  - Follow up with urology for your history of urinary obstruction      Please review all discharge instructions as discussed.  Please be re-evaluated by your primary doctor as soon as possible and be sure to review all laboratory, radiology and other testing with your doctor.  Should your symptoms worsen or not improve, return to emergency room immediately.    We wish you the best of health!

## 2025-04-02 NOTE — ASSESSMENT & PLAN NOTE
History of CABG in 1996  No recent stress test or cath available on file  Previously on plavix and aspirin; Plavix had been discontinued over the last several months due to recurrent GI bleeds.      - Continue aspirin 81 mg daily

## 2025-04-02 NOTE — ASSESSMENT & PLAN NOTE
Follows with Dr. Jeffrey at [facility]  Causes recurrent left sided plural effusions requiring serial thoracentesis  TTE 09/2024 EF 40 to 45%, abnormal septal motion due to RV volume overload, unable to assess diastolic function. 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  CHF has been complicated by pleural effusions, saw Dr. Gregg outpatient no plan for PLeuRx     - Continue torsemide  - Daily weights, strict I's/O  -in AM ask IR about more fluid removal this admission

## 2025-04-02 NOTE — PROGRESS NOTES
Spoke with patient's son and confirmed with medication list from [facility] and/or patient's own pharmacy to complete the medication reconciliation.     Prior to admission medication list:    Current Outpatient Medications:     escitalopram (LEXAPRO) 5 mg tablet, Take 5 mg by mouth daily., Disp: , Rfl:     metoprolol succinate XL (TOPROL-XL) 25 mg 24 hr tablet, Take 12.5 mg by mouth daily., Disp: , Rfl:     aspirin 81 mg enteric coated tablet, Take 81 mg by mouth daily. 9 am, Disp: , Rfl:     bimatoprost (LUMIGAN) 0.01 % ophthalmic drops, Administer 1 drop into the left eye nightly., Disp: , Rfl:     buPROPion XL (WELLBUTRIN XL) 150 mg 24 hr tablet, Take 150 mg by mouth daily., Disp: , Rfl:     calcium, as carbonate, (TUMS) 200 mg calcium (500 mg) chewable tablet, Take 1 tablet by mouth daily. 11 am, Disp: , Rfl:     carboxymethylcellulose (REFRESH PLUS) 0.5 % dropperette, Administer 1 drop into both eyes every2 (two) hours while awake., Disp: , Rfl:     clopidogreL (PLAVIX) 75 mg tablet, Take 75 mg by mouth daily., Disp: , Rfl:     cranberry extract 425 mg capsule, Take 425 mg by mouth daily. 5 pm, Disp: , Rfl:     cyanocobalamin (VITAMIN B12) 500 mcg tablet, Take 500 mcg by mouth every morning. 9 am, Disp: , Rfl:     ferrous sulfate 325 mg (65 mg iron) tablet, Take 65 mg by mouth daily with breakfast. 11 am, Disp: , Rfl:     finasteride (PROSCAR) 5 mg tablet, Take 5 mg by mouth daily. 9 am, Disp: , Rfl:     levothyroxine (SYNTHROID) 100 mcg tablet, Take 100 mcg by mouth daily before breakfast. 7 am, Disp: , Rfl:     melatonin 10 mg capsule, Take 10 mg by mouth nightly. 9 pm, Disp: , Rfl:     methenamine (HIPREX) 1 gram tablet, Take 1 g by mouth daily before lunch. 11 am, Disp: , Rfl:     omeprazole (PriLOSEC) 20 mg capsule, Take 20 mg by mouth daily. 11 am, Disp: , Rfl:     potassium chloride (KLOR-CON M) 20 mEq CR tablet, Take 40 mEq by mouth 2 (two) times a day. 9 am and 5 pm, Disp: , Rfl:     tamsulosin  (FLOMAX) 0.4 mg capsule, Take 0.4 mg by mouth every morning. 9 am, Disp: , Rfl:     thiamine HCl (VITAMIN B1) 100 mg tablet, Take 100 mg by mouth every morning. 9 am, Disp: , Rfl:     torsemide (DEMADEX) 10 mg tablet, Take 20 mg by mouth 2 (two) times a day. 9 am, Disp: , Rfl:      Comments about home medications:  -Patient finished course of Augmentin.  -Patient is not taking doxepin.  -Patient's son states patient's caregiver crushes tablets and puts it in applesauce, easier for patient to swallow.      Compliance:   -clopidogrel 75 mg last filled 11/1/24 90 day supply.  -escitalopram 5 mg last filled 12/24/24 58 day supply.  -methenamine 1 g last filled 12/3/24 90 day supply.  -Recent fills on all other medications.

## 2025-04-02 NOTE — ASSESSMENT & PLAN NOTE
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

## 2025-04-02 NOTE — OR SURGEON
Pre-Procedure patient identification:  I am the primary operating surgeon/proceduralist and I have reviewed the applicable pathology reports and radiology studies for this procedure. I have identified the patient on 04/02/25 at 9:20 AM SOPHIA Seals C

## 2025-04-02 NOTE — ASSESSMENT & PLAN NOTE
History of recurrent pleural effusion  Most recent diagnostic thoracentesis was done in 09/2024 with lights criteria consistent with transudative effusion, however of note thoracentesis during 07/2024 admission was exudative  Previous therapeutic thoracenteses on 1/16 with removal of 800 mL serosanguineous fluid  S/p IR thoracentesis 4/2, c/b episode of slurred speech afterward  CT Chest 4/2 post-thoracentesis: Bilateral pleural effusions both of which are smaller as compared to CT exam of February 2025. Bilateral lower lobe consolidative opacities similar to somewhat improved since prior exam.  Atelectasis versus pneumonia.     - S/p IR US guided left thoracentesis with 1L removed   - CT Chest with improved appearance of pleural effusions

## 2025-04-02 NOTE — ASSESSMENT & PLAN NOTE
Hx of previous EBSL E. Coli + UTI resistant to multiple cephalosporins, bactrim and fluroquinolines in October 2024  Pt complaining of polyuria urinary frequency  UA with cloudy color, +3 LE, WBC TNTC, 1+ bacteria    No CVA tenderness on exam  S/p 500mg NS bolus and 1g ertapenem in the ED    - F/u ucx  - Continue ertapenem narrow with sens/spec----deescalate to Augmentin if same as before  - Consider CT A/P with and without contrast if pt were to decompensate, develop oliguria to r/o pyelonephritis, calculi, abscesses, gas-forming infections, obstruction.  -speech consult for dysarthria

## 2025-04-02 NOTE — H&P
Interventional Radiology Abbreviated Preprocedure H&P    Planned Procedure: left thora.    History: Patient is a 89 y.o. male who presents with recurrent left effusion and SOLIS for left thoracentesis.    Planned Level of Analgesia: Local, as required    Review of Systems: All other systems deferred except as noted.  No other comorbidities or medical conditions that would affect the course of treatment     Physical Exam: Alert, no acure distress.  Focused physical exam of Chest was performed with no unexpected issues identified.     Labs: None relevant     Imaging: Relevant pertinent imaging reviewed    Assessment/Plan: Plan to proceed with left thoracentesis today.

## 2025-04-02 NOTE — HOSPITAL COURSE
Evaluated patient on day of discharge and reviewed discharge summary, discharge instructions, and discharge orders.  I placed discharge order myself.  I reviewed discharge instructions, necessary outpatient follow-ups and return precautions with patient at bedside prior to discharge.  Please see discharge summary for further details.  Greater than 30 minutes were spent on discharge counseling, discharge planning, and care coordination.    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    # Slurred Speech  # Hx ESBL E. Coli  Noted slurred speech after thoracentesis, waxes and wanes around procedures.  Worse in early morning and evening, likely also with component of delirium. Low concern for stroke, overall at baseline.  UA concerning initially for infection but urine and blood cultures negative.  S/p ertapenem x1.    - PCP follow up in 1-2 weeks  - Outpatient urology follow up  - Discharge with palliative bridge    # Pleural Effusions  # HFmrEF  Has recurrent pleural effusions thought in setting of HF and TR.  Discussed pleurX as outpatient but ultimately pulm did not recommend.  S/p 1L thora prior to admission. Appears euvolemic  - Continue home metoprolol and torsemide  - No SGLT2i in setting of hx of Justin's    # CAD s/p CABG  No chest pain, continue ASA    # Afib  Continue home metop, no AC in setting of GIB     Rest of plans per attached note.

## 2025-04-02 NOTE — POST-PROCEDURE NOTE
Interventional Radiology Brief Postprocedure Note    Samy Elena     Attending: SOPHIA Seals / Elio Espinoza M.D.    Assistant: n/a    Diagnosis: SOB    Description of procedure: US guided left thoracentesis    Contrast: none     Anesthesia:  Local (Lidocaine)    Volume of Lidocaine Utilized (ml): 10ml     Medications: none     Complications: None      Estimated Blood Loss: Estimated Blood Loss: 0-10 ml    Anticoagulation: continue antiplatelet    Specimens: 1L seroussanginous fluid    Findings: Successful US guided left thoracentesis with 1L removed. VSS throughout, CXR pending.    4/2/2025 9:45 AM

## 2025-04-03 ENCOUNTER — APPOINTMENT (INPATIENT)
Dept: RADIOLOGY | Facility: HOSPITAL | Age: 89
DRG: 307 | End: 2025-04-03
Payer: MEDICARE

## 2025-04-03 PROBLEM — R06.02 SHORTNESS OF BREATH: Status: ACTIVE | Noted: 2025-04-03

## 2025-04-03 LAB
ANION GAP SERPL CALC-SCNC: 12 MEQ/L (ref 3–15)
BACTERIA UR CULT: NORMAL
BASOPHILS # BLD: 0.03 K/UL (ref 0.01–0.1)
BASOPHILS NFR BLD: 0.5 %
BUN SERPL-MCNC: 29 MG/DL (ref 7–25)
CALCIUM SERPL-MCNC: 8.2 MG/DL (ref 8.6–10.3)
CHLORIDE SERPL-SCNC: 105 MEQ/L (ref 98–107)
CO2 SERPL-SCNC: 21 MEQ/L (ref 21–31)
CREAT SERPL-MCNC: 1.5 MG/DL (ref 0.7–1.3)
DIFFERENTIAL METHOD BLD: ABNORMAL
EGFRCR SERPLBLD CKD-EPI 2021: 43.1 ML/MIN/1.73M*2
EOSINOPHIL # BLD: 0.06 K/UL (ref 0.04–0.54)
EOSINOPHIL NFR BLD: 1.1 %
ERYTHROCYTE [DISTWIDTH] IN BLOOD BY AUTOMATED COUNT: 19.9 % (ref 11.6–14.4)
GLUCOSE SERPL-MCNC: 89 MG/DL (ref 70–99)
HCT VFR BLD AUTO: 31 % (ref 40.1–51)
HGB BLD-MCNC: 9.8 G/DL (ref 13.7–17.5)
IMM GRANULOCYTES # BLD AUTO: 0.03 K/UL (ref 0–0.08)
IMM GRANULOCYTES NFR BLD AUTO: 0.5 %
LYMPHOCYTES # BLD: 0.51 K/UL (ref 1.2–3.5)
LYMPHOCYTES NFR BLD: 9.2 %
MAGNESIUM SERPL-MCNC: 1.9 MG/DL (ref 1.8–2.5)
MCH RBC QN AUTO: 32.6 PG (ref 28–33.2)
MCHC RBC AUTO-ENTMCNC: 31.6 G/DL (ref 32.2–36.5)
MCV RBC AUTO: 103 FL (ref 83–98)
MONOCYTES # BLD: 0.52 K/UL (ref 0.3–1)
MONOCYTES NFR BLD: 9.4 %
NEUTROPHILS # BLD: 4.39 K/UL (ref 1.7–7)
NEUTS SEG NFR BLD: 79.3 %
NRBC BLD-RTO: 0 %
PLATELET # BLD AUTO: 125 K/UL (ref 150–350)
PMV BLD AUTO: 10.9 FL (ref 9.4–12.4)
POTASSIUM SERPL-SCNC: 4.2 MEQ/L (ref 3.5–5.1)
QRS DURATION: 174
QT INTERVAL: 508
QTC CALCULATION(BAZETT): 528
R AXIS: 83
RBC # BLD AUTO: 3.01 M/UL (ref 4.5–5.8)
SODIUM SERPL-SCNC: 138 MEQ/L (ref 136–145)
T WAVE AXIS: -30
VENTRICULAR RATE: 65
WBC # BLD AUTO: 5.54 K/UL (ref 3.8–10.5)

## 2025-04-03 PROCEDURE — 93005 ELECTROCARDIOGRAM TRACING: CPT

## 2025-04-03 PROCEDURE — 76770 US EXAM ABDO BACK WALL COMP: CPT

## 2025-04-03 PROCEDURE — 21400000 HC ROOM AND CARE CCU/INTERMEDIATE

## 2025-04-03 PROCEDURE — 97535 SELF CARE MNGMENT TRAINING: CPT | Mod: GO

## 2025-04-03 PROCEDURE — 93010 ELECTROCARDIOGRAM REPORT: CPT | Performed by: INTERNAL MEDICINE

## 2025-04-03 PROCEDURE — 99223 1ST HOSP IP/OBS HIGH 75: CPT | Performed by: NURSE PRACTITIONER

## 2025-04-03 PROCEDURE — 85025 COMPLETE CBC W/AUTO DIFF WBC: CPT

## 2025-04-03 PROCEDURE — 63600000 HC DRUGS/DETAIL CODE

## 2025-04-03 PROCEDURE — 99233 SBSQ HOSP IP/OBS HIGH 50: CPT

## 2025-04-03 PROCEDURE — 97162 PT EVAL MOD COMPLEX 30 MIN: CPT | Mod: GP

## 2025-04-03 PROCEDURE — 80048 BASIC METABOLIC PNL TOTAL CA: CPT

## 2025-04-03 PROCEDURE — 97166 OT EVAL MOD COMPLEX 45 MIN: CPT | Mod: GO

## 2025-04-03 PROCEDURE — 63700000 HC SELF-ADMINISTRABLE DRUG

## 2025-04-03 PROCEDURE — 83735 ASSAY OF MAGNESIUM: CPT

## 2025-04-03 PROCEDURE — 36415 COLL VENOUS BLD VENIPUNCTURE: CPT

## 2025-04-03 RX ADMIN — Medication 10 MG: at 19:48

## 2025-04-03 RX ADMIN — CYANOCOBALAMIN TAB 500 MCG 500 MCG: 500 TAB at 11:38

## 2025-04-03 RX ADMIN — CARBOXYMETHYLCELLULOSE SODIUM 1 DROP: 5 SOLUTION/ DROPS OPHTHALMIC at 11:38

## 2025-04-03 RX ADMIN — CARBOXYMETHYLCELLULOSE SODIUM 1 DROP: 5 SOLUTION/ DROPS OPHTHALMIC at 05:42

## 2025-04-03 RX ADMIN — CARBOXYMETHYLCELLULOSE SODIUM 1 DROP: 5 SOLUTION/ DROPS OPHTHALMIC at 12:48

## 2025-04-03 RX ADMIN — HEPARIN SODIUM 5000 UNITS: 5000 INJECTION, SOLUTION INTRAVENOUS; SUBCUTANEOUS at 21:26

## 2025-04-03 RX ADMIN — CARBOXYMETHYLCELLULOSE SODIUM 1 DROP: 5 SOLUTION/ DROPS OPHTHALMIC at 09:04

## 2025-04-03 RX ADMIN — HEPARIN SODIUM 5000 UNITS: 5000 INJECTION, SOLUTION INTRAVENOUS; SUBCUTANEOUS at 05:42

## 2025-04-03 RX ADMIN — LATANOPROST 1 DROP: 50 SOLUTION OPHTHALMIC at 21:27

## 2025-04-03 RX ADMIN — ANTACID TABLETS 200 MG OF ELEMENTAL CALCIUM: 500 TABLET, CHEWABLE ORAL at 11:35

## 2025-04-03 RX ADMIN — CARBOXYMETHYLCELLULOSE SODIUM 1 DROP: 5 SOLUTION/ DROPS OPHTHALMIC at 18:05

## 2025-04-03 RX ADMIN — TAMSULOSIN HYDROCHLORIDE 0.4 MG: 0.4 CAPSULE ORAL at 11:38

## 2025-04-03 RX ADMIN — CARBOXYMETHYLCELLULOSE SODIUM 1 DROP: 5 SOLUTION/ DROPS OPHTHALMIC at 19:52

## 2025-04-03 RX ADMIN — FERROUS SULFATE TAB 325 MG (65 MG ELEMENTAL FE) 325 MG: 325 (65 FE) TAB at 11:35

## 2025-04-03 RX ADMIN — FINASTERIDE 5 MG: 5 TABLET, FILM COATED ORAL at 11:35

## 2025-04-03 RX ADMIN — ASPIRIN 81 MG: 81 TABLET, COATED ORAL at 11:35

## 2025-04-03 RX ADMIN — CARBOXYMETHYLCELLULOSE SODIUM 1 DROP: 5 SOLUTION/ DROPS OPHTHALMIC at 21:26

## 2025-04-03 RX ADMIN — CARBOXYMETHYLCELLULOSE SODIUM 1 DROP: 5 SOLUTION/ DROPS OPHTHALMIC at 15:24

## 2025-04-03 RX ADMIN — THIAMINE HCL TAB 100 MG 100 MG: 100 TAB at 11:35

## 2025-04-03 RX ADMIN — HEPARIN SODIUM 5000 UNITS: 5000 INJECTION, SOLUTION INTRAVENOUS; SUBCUTANEOUS at 15:22

## 2025-04-03 RX ADMIN — LATANOPROST 1 DROP: 50 SOLUTION OPHTHALMIC at 04:37

## 2025-04-03 ASSESSMENT — COGNITIVE AND FUNCTIONAL STATUS - GENERAL
AFFECT: WFL
MOVING TO AND FROM BED TO CHAIR: 2 - A LOT
DRESSING REGULAR LOWER BODY CLOTHING: 1 - TOTAL
CLIMB 3 TO 5 STEPS WITH RAILING: 1 - TOTAL
AFFECT: WFL
WALKING IN HOSPITAL ROOM: 1 - TOTAL
STANDING UP FROM CHAIR USING ARMS: 2 - A LOT
MOVING TO AND FROM BED TO CHAIR: 2 - A LOT
STANDING UP FROM CHAIR USING ARMS: 2 - A LOT
WALKING IN HOSPITAL ROOM: 2 - A LOT
WALKING IN HOSPITAL ROOM: 1 - TOTAL
HELP NEEDED FOR BATHING: 2 - A LOT
HELP NEEDED FOR PERSONAL GROOMING: 4 - NONE
DRESSING REGULAR UPPER BODY CLOTHING: 3 - A LITTLE
CLIMB 3 TO 5 STEPS WITH RAILING: 1 - TOTAL
CLIMB 3 TO 5 STEPS WITH RAILING: 1 - TOTAL
MOVING TO AND FROM BED TO CHAIR: 2 - A LOT
EATING MEALS: 4 - NONE
STANDING UP FROM CHAIR USING ARMS: 2 - A LOT
TOILETING: 1 - TOTAL

## 2025-04-03 NOTE — ASSESSMENT & PLAN NOTE
Hx of prior atrial fibrillation  Home regimen Not on AC due to history of GI bleed.  Follows with Dr. Roshan Jeffrey at [facility]  [id number] score: 5 points (age +2, CHF history +1, HTN history +1, prior NSTEMI +1)     - monitor on tele  -Heparin for DVT

## 2025-04-03 NOTE — CONSULTS
Infectious Disease Consult Note    Patient Name: Samy Elena  MR#: [id number]  : 10/6/1930  Admission Date: 2025  Consult Date: 25 7:36 AM   Consultant: Aretha Altman MD    Reason for Consult: ertapenem  Referring Provider: Gemma Mckeon MD      History of Present Illness     Samy Elena is a 89 y.o. male who was admitted on 2025.  Has a complex medical history that includes heart failure, atrial fibrillation, TR, hypertension, diverticulitis status post colostomy, recurrent pleural effusion secondary to congestive heart failure that requires thoracentesis, history of Justin's gangrene, coronary disease, CABG, hypothyroidism.  According to the records, the patient was admitted to the hospital on 2025.  He was interventional radiology, getting a thoracentesis procedure.  Following the procedure, the staff noted slurred speech that prompted a stroke alert.  Primary team evaluated, and a CAT scan of the head was negative for acute abnormalities.  His abnormal speech since then returned to baseline.  According to the records, the patient's son indicated, that his speech gets abnormal, his mentation gets abnormal in association with worsening pleural effusions, and also in the past with UTIs.    According to the records, he had borderline low blood pressure immediately after the procedure, bradycardia.  In the ED his creatinine was 1.6.  Urinalysis with too numerous to count white cells, leukocyte esterase.    He has a history of ESBL E. coli in the urine on 10/16/2024.    Patient received fluids, and also received IV ertapenem.  Cultures are pending.  His creatinine was 1.6.  ALT 6, AST 29.    Infectious diseases was consulted, for carbapenem ID approval.    Patient complained of polyuria and urinary frequency. No dysuria.  No back pain.  No suprapubic pain.  No hematuria.        Allergies:   Allergies   Allergen Reactions    Jardiance [Empagliflozin] Other (see comments)      denise's gangrene       Medical History:  has a past medical history of Aneurysm of internal iliac artery (CMS/HCC), Atrial fibrillation (CMS/HCC), CHF (congestive heart failure) (CMS/HCC), 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), Coronary artery disease, Disease of thyroid gland, Will catheter in place, GI (gastrointestinal bleed), Hypertension, Myocardial infarction (CMS/HCC), Orthostatic hypotension, and TIA (transient ischemic attack).    Surgical History:   Past Surgical History   Procedure Laterality Date    cataract extraction right eye with implant and limbal relaxing incision Right 7/18/2024    Performed by Hayder Moses MD at  OR [facility]    Cataract extraction w/ intraocular lens implant Left     Cataract extraction with LRI and anterior vitrectomy left eye Left 11/16/2023    Performed by Hayder Moses MD at  OR [facility]    Cholecystectomy      Colostomy      COLOSTOMY LAPAROSCOPIC N/A 8/3/2022    Performed by Mat Bryant MD at [facility] OR    Coronary artery bypass graft      DIAGNOSTIC FLEXIBLE SIGMOIDOSCOPY WITH COLLECTION OF SPECIMEN BY WASHING N/A 11/27/2024    Performed by David Holcomb MD at [facility] GI    DIAGNOSTIC FLEXIBLE SIGMOIDOSCOPY WITH COLLECTION OF SPECIMEN BY WASHING N/A 10/18/2024    Performed by Maine Lopez MD at [facility]    DIAGNOSTIC UPPER GASTROINTESTINAL ENDOSCOPY WITH COLLECTION OF SPECIMEN BY WASHING N/A 11/27/2024    Performed by David Holcomb MD at [facility]    FLEXIBLE SIGMOIDOSCOPY WITH BIOPSY N/A 10/23/2024    Performed by David Holcomb MD at [facility]    INCISION AND DRAINAGE WITH DEBRIDMENT OF BUTTOCK, AND PERINEUM N/A 8/2/2022    Performed by Mat Bryant MD at [facility] OR    Joint replacement Left     Knee    RIGHT HEART CATH N/A 8/4/2022    Performed by Cristal Lacey MD at [facility] CARDIAC CATH/EP    Thyroidectomy      WASHOUT OF PERINEAL WOUND, COLONIC LAVAGE N/A 8/3/2022    Performed by Mat Bryant MD at [facility] OR         Social History:   Social History  "    Tobacco Use    Smoking status: Never    Smokeless tobacco: Never   Vaping Use    Vaping status: Never Used   Substance Use Topics    Alcohol use: Not Currently     Comment: social    Drug use: Never              Family History: family history is not on file.    Family history reviewed and non-contributory to current ID issues.    Review of Systems:    All other systems reviewed an negative except for what is noted in HPI.     Medications:       Current Facility-Administered Medications   Medication Dose Route Frequency    aspirin  81 mg oral Daily    calcium (as carbonate)  200 mg of elemental calcium oral Daily    carboxymethylcellulose  1 drop Both Eyes q2h while awake    [Provider Managed Hold] clopidogreL  75 mg oral Daily    cyanocobalamin  500 mcg oral q AM    glucose  16-32 g of dextrose oral PRN    Or    dextrose  15-30 g of dextrose oral PRN    Or    glucagon  1 mg intramuscular PRN    Or    dextrose 50 % in water (D50)  25 mL intravenous PRN    ertapenem  1 g intravenous q24h INT    ferrous sulfate  325 mg oral Daily with breakfast    finasteride  5 mg oral Daily    heparin (porcine)  5,000 Units subcutaneous q8h IVÁN    latanoprost  1 drop Both Eyes Nightly    levothyroxine  100 mcg oral Daily before breakfast    melatonin  10 mg oral Nightly    [Provider Managed Hold] methenamine  1 g oral Daily before lunch    [Provider Managed Hold] metoprolol succinate XL  12.5 mg oral Daily    [Provider Managed Hold] potassium chloride  40 mEq oral BID    tamsulosin  0.4 mg oral q AM    thiamine HCl  100 mg oral q AM    [Provider Managed Hold] torsemide  20 mg oral BID           Objective     Vital Signs:    Visit Vitals  BP (!) 112/52 (BP Location: Right upper arm, Patient Position: Lying)   Pulse 61   Temp 36.4 °C (97.5 °F) (Axillary)   Resp 16   Ht 1.854 m (6' 1\")   Wt 78.2 kg (172 lb 8 oz)   SpO2 (!) 91%   BMI 22.76 kg/m²     Temp (72hrs), Av.4 °C (97.5 °F), Min:36.1 °C (97 °F), Max:36.5 °C (97.7 " °F)    Non-toxic  Awake, alert, answers questions, smiles  Moves all 4 extremities  No meningeal signs  HEENT, no thrush  Lungs CTA b/l  Heart S1 and S2, no stigmata of endocarditis  Abdomen BS normal, soft, NT, no rebound, no CVA tenderness, no spine tenderness. No suprapubic tenderness  Colostomy  No cellulitis  Very minimal dysarthria      Lines, Drains, Airways, Wounds:  Peripheral IV (Adult) 04/02/25 Anterior;Proximal;Right Forearm (Active)   Number of days: 1       Peripheral IV (Adult) 04/02/25 Left Antecubital (Active)   Number of days: 1       Labs:  CBC Results         04/03/25 04/02/25 03/14/25     0332 1006 1231    WBC 5.54 5.25 4.27    RBC 3.01 2.88 2.86    HGB 9.8 9.3 9.0    HCT 31.0 29.2 29.7    .0 101.4 103.8    MCH 32.6 32.3 31.5    MCHC 31.6 31.8 30.3     111 113            CMP Results         04/03/25 04/02/25 02/16/25     0332 1006 0148     138 141    K 4.2 4.3 4.3    Cl 105 106 111    CO2 21 25 25    Glucose 89 110 100    BUN 29 30 26    Creatinine 1.5 1.6 1.5    Calcium 8.2 8.2 7.5    Anion Gap 12 7 5    AST -- 29 28    ALT -- 6 6    Albumin -- 2.8 2.6    EGFR 43.1 39.9 43.1           Comment for K at 0148 on 02/16/25: Results obtained on plasma. Plasma Potassium values may be up to 0.4 mEQ/L less than serum values. The differences may be greater for patients with high platelet or white cell counts.    Comment for EGFR at 0332 on 04/03/25: Calculation based on the Chronic Kidney Disease Epidemiology Collaboration (CKD-EPI) equation refit without adjustment for race.    Comment for EGFR at 1006 on 04/02/25: Calculation based on the Chronic Kidney Disease Epidemiology Collaboration (CKD-EPI) equation refit without adjustment for race.    Comment for EGFR at 0148 on 02/16/25: Calculation based on the Chronic Kidney Disease Epidemiology Collaboration (CKD-EPI) equation refit without adjustment for race.                   Microbiology Results       ** No results found for the  last 720 hours. **                Imaging:    CT CHEST WITHOUT IV CONTRAST [[id number]] Collected: 04/02/25 1402   Order Status: Completed Updated: 04/02/25 1516   Narrative:     CLINICAL HISTORY: Worsening shortness of breath, 89-year-old male    COMMENT: Unenhanced CT of the chest is performed.    CT DOSE:  One or more dose reduction techniques (e.g. automated exposure  control, adjustment of the mA and/or kV according to patient size, use of  iterative reconstruction technique) utilized for this examination    Intravenous contrast was not administered..    Lack of intravenous contrast limits assessment of the solid organs, vasculature  and certain other processes.    COMPARISON STUDY: Chest CT dated 2/16/2025.    FINDINGS:    Chest wall and pleura: There are relatively small bilateral pleural effusions  left slightly larger than right both decreased in size since previous CT exam.    The lung fields: Bilateral posterior lower lobe consolidative opacities  atelectasis versus pneumonia.  Biapical pleural-parenchymal scarring.    Heart and pericardium: The heart is enlarged.  No pericardial effusion.  There  are significant coronary arterial calcifications.  Changes of probable coronary  artery bypass graft surgery noted.    Chest Wall: Changes of median sternotomy.    Mediastinum and bismark: No gross lymphadenopathy appreciated given limitations of  unenhanced imaging.    Axilla: No axillary adenopathy.    Osseous structures: Stable compression fractures of T5 and L1.   Impression:     IMPRESSION:  Bilateral pleural effusions both of which are smaller as compared to CT exam of  February 2025.    Bilateral lower lobe consolidative opacities similar to somewhat improved since  prior exam.  Atelectasis versus pneumonia.    There are stable compression fractures of the T5 and L1 vertebral bodies.    Stable cardiomegaly.         Assessment   This is a 89 y.o. male who has a complex medical history, that includes heart  failure, atrial fibrillation, TR, hypertension, diverticulitis status post colostomy, recurrent pleural effusion secondary to congestive heart failure that requires thoracentesis, history of Justin's gangrene, coronary disease, CABG, hypothyroidism.  According to the records, the patient was admitted to the hospital on 4/2/2025.  He was at interventional radiology, getting a thoracentesis procedure.  Following the procedure, the staff noted slurred speech that prompted a stroke alert.  Part of the workup for his clinical presentation, including a urinalysis.  The urinalysis was abnormal, cultures collected, and the patient was initiated on broad-spectrum IV antibiotic, since he has a history of MDRO UTIs.    Abnormal urinalysis rule out acute pathology or UTI  History of bladder outlet obstruction, with enlarged prostate.  Confusion, slurred speech, encephalopathy.  Chronic pleural effusions                Plan     RECOMMEND:    Patient has a history of bladder outlet obstruction, with an enlarged prostate, therefore I had significant risk for urinary retention, leading to urinary tract infections in the setting of likely chronic bacteriuria.  Recommend obtaining an ultrasound of the kidneys and bladder.  Monitor closely for urinary retention.    Pending results of cultures, for now continue with intravenous ertapenem.  Closely monitor his creatinine clearance as the ertapenem dose will need to be adjusted accordingly.    Hold methenamine while on antibiotics.    Close follow-up of all cultures until completely final  Adjust the antimicrobials as needed, depending on clinical progress, culture results,   and sensitivities.    While on antimicrobials, closely follow CBC with diff and CMP.  Follow creatinine clearance/eGFR.    Monitor for diarrhea, rashes, and any other medication toxicities.  Monitor vital signs closely, including fever curve, oxygenation, and blood pressure.    DVT prophylaxis per primary  team  Close monitoring of blood sugars, volume status, correction of any electrolyte abnormalities, aspiration precautions, mouth/oral care, skin care and fall precautions per primary team.       Please call ID if any questions or concerns, if any new culture data that needs immediate attention and if any clinical deterioration.   Recommendations discussed and related to primary team.        NOTE: Some or all of the note above was created with the use of dictation software, please note this dictation software can have anomalies in its ability to transcribe. Please contact me directly for anything that seems abnormal for clarification.

## 2025-04-03 NOTE — PLAN OF CARE
Plan of Care Review  Plan of Care Reviewed With: patient  Progress: no change  Outcome Evaluation: Pt disoiented x4.  Tachy.  No complaints of pain. Frequent rounding with q2 turns.  Incontinence care, sacral wound redressed.  No PO meds given d/t DHT not replaced after d/c 4/2 monring.  Provider aware.  Call bell within reach.

## 2025-04-03 NOTE — HOSPITAL COURSE
Samy is a 89 y.o. male admitted on 4/2/2025 with Word finding difficulty [R47.89]  Acute encephalopathy [G93.40]  Complicated UTI (urinary tract infection) [N39.0]  CKD stage 3a, GFR 45-59 ml/min (CMS/[facility]) [N18.31]. Principal problem is Dysarthria.    Past Medical History  Samy has a past medical history of Aneurysm of internal iliac artery (CMS/[facility]), Atrial fibrillation (CMS/[facility]), CHF (congestive heart failure) (CMS/[facility]), 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[facility]), Coronary artery disease, Disease of thyroid gland, Will catheter in place, GI (gastrointestinal bleed), Hypertension, Myocardial infarction (CMS/[facility]), Orthostatic hypotension, and TIA (transient ischemic attack).    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 Pt was at IR 4/2 getting a thoracentesis procedure. Following the procedure, staff noted slurred speech prompting a stroke alert to be called. STAT CT head was negative for acute abnormalities and his abnormal speech has since resolved however his son notes that pt seems more confused than his baseline. Per discussion with his son, the speech difficulty actually started at home over the last couple of days prior to the thoracentesis. He says this often occurs when pt has re-accumulation of his pleural effusion. He had had confusion in the past when he has had UTIs.

## 2025-04-03 NOTE — ASSESSMENT & PLAN NOTE
History of CABG in 1996  No recent stress test or cath available on file  Previously on plavix and aspirin; Plavix had been discontinued over the last several months due to recurrent GI bleeds.      - Continue aspirin 81 mg daily       Post-Care Instructions: I reviewed with the patient in detail post-care instructions. Patient should stay away from the sun and wear sun protection until treated areas are fully healed.

## 2025-04-03 NOTE — ASSESSMENT & PLAN NOTE
Hx of previous EBSL E. Coli + UTI resistant to multiple cephalosporins, bactrim and fluroquinolines in October 2024  Pt complaining of polyuria urinary frequency  UA with cloudy color, +3 LE, WBC TNTC, 1+ bacteria    No CVA tenderness on exam  S/p 500mg NS bolus and 1g ertapenem in the ED    - F/u ucx  - Continue ertapenem, narrow with sens/spec----deescalate to Augmentin if same as before  - fu ID consult  - fu SLP consult: seen by SLP in 7/2024, failed VFSS, family w/ palliative elected for comfort feeds. Per SLP, patient would need repeat VFSS for eval.  - Need to clarify GOC with patient and family, including VFSS.  - Consult palliative care  - Consider CT A/P with and without contrast if pt were to decompensate, develop oliguria to r/o pyelonephritis, calculi, abscesses, gas-forming infections, obstruction.

## 2025-04-03 NOTE — DISCHARGE SUMMARY
Hospital Medicine    Inpatient Discharge Summary        BRIEF OVERVIEW     Patient: Samy Elena  Admission Date: 2025   : 10/6/1930 Discharge Date: 2025       PCP: Zachery Hernandez MD  Disposition:  Home    Destination:       Attending Provider: Gemma Mckeon MD Attending phys phone: [phone]    Code Status At Discharge: DNR (A.N.D.)    Samy Elena was seen by the palliative care team during this hospitalization.  Please see palliative care team documentation for further details.  Also reference the advance care planning (ACP) website (Advance Care Planning (Advance Directive)  Patient services  [facility] ) to learn more about completing an advance directive, including Health Care Power of  and Living Will documents.        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 Pt was at IR today getting a thoracentesis procedure. Following the procedure, staff noted slurred speech prompting a stroke alert to be called. STAT CT head was negative for acute abnormalities and his abnormal speech has since resolved however his son notes that pt seems more confused than his baseline. Per discussion with his son, the speech difficulty actually started at home over the last couple of days prior to the thoracentesis. He says this often occurs when pt has re-accumulation of his pleural effusion or UTIs. Has Hx of previous EBSL E. Coli + UTI resistant to multiple cephalosporins, bactrim and fluroquinolines in 2024. CT chest showed improvement in bilateral pleural effusions compared to prior study in . UA with TNTC WBC/3+ LE. Pt received  cc bolus, 1g ertapenem in the ED.  However, urine culture negative, thus antibiotics discontinued.     ASSESSMENT AND PLAN     #Dysarthria  #Toxic metabolic encephalopathy  #Recurring pleural effusions 2/2 HF and TR  #Hx of bladder outlet obstruction  Patient presented to the ED for slurred speech in the IR suite after a scheduled thoracentesis. Stroke alert called, CT head negative. Slurred speech resolved. Per son, pt with ongoing speech difficulty and confusion at home for several months that progressed over last couple days prior to thoracentesis. Per son and chart review, patient has hx of multiple admissions for metabolic encephalopathy 2/2 pleural effusions or UTIs. Has Hx of previous EBSL E. Coli + UTI resistant to multiple cephalosporins, bactrim and fluroquinolines in October 2024. Patient with mild amount of hematuria, but denied dysuria. UA with cloudy color, +3 LE, WBC TNTC, 1+ bacteria    No CVA tenderness on exam. S/p 500mg NS bolus and ertapenem initiated in the ED. Ucx with no growth. ID consulted. Low suspicion for UTI given no growth on culture. Ertapenem stopped. Patient also with history of bladder outlet obstruction 2/2 enlarged prostate, therefore risk of retention and chronic bacteriuria. US kidney/bladder showed no acute obstruction, showed diffuse urinary bladder wall irregularity and multiple bladder diverticula,consistent with sequela of chronic bladder obstruction .  Patient had episode of active burping and dry heaving with some sputum. Due to concern of aspiration, made strict NPO. Of note, patient seen by SLP in 7/2024, failed VFSS, family w/ palliative elected for comfort feeds. Per SLP, patient would need repeat VFSS for additional eval during this admission. Family confirmed they want to continue comfort feeds. Patient without diet restrictions at home. Diet was liberated to continue comfort feeds.    - Continue comfort feeds  - Palliative care bridge  - Follow up urology   - Follow up PCP    #HFrEF (heart failure  with reduced ejection fraction)   #Recurring pleural effusions 2/2 HF and TR  Follows with Dr. Jeffrey at [facility]  Causes recurrent left sided plural effusions requiring serial thoracentesis  TTE 09/2024 EF 40 to 45%, abnormal septal motion due to RV volume overload, unable to assess diastolic function. RV massively dilated with severely decreased systolic function, torrential tricuspid regurgitation  Maintained on torsemide 20 mg daily, SGLT2 contraindicated 2/2 history of Denise's gangrene  Dry weight roughly 164 LBS, currently presenting on RA and appears euvolemic with chronic distended neck veins.  CHF has been complicated by pleural effusions, saw Dr. Gregg outpatient no plan for PLeuRx  s/p scheduled thoracentesis with 1L removal on 4/2//25  Metoprolol and torsemide held iso of possible infection then resumed on day of discharge given stable BP.    -- Continue torsemide and metoprolol  -- Fu cardiology   -- Continue regularly scheduled thoracentesis outpatient    CAD (coronary artery disease)  History of CABG in 1996  No recent stress test or cath available on file  Previously on plavix and aspirin; Plavix had been discontinued over the last several months due to recurrent GI bleeds.     - Continue aspirin 81 mg daily    Hypertension  Normotensive on admission  Home medication: torsemide 20 mg daily, metoprolol succ 12.mg      - Continue home torsemide and metoprolol    Hypothyroidism  TSH 6.26 on 05/03/2024.  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  Follows with Dr. Roshan Jeffrey at  [facility]  [id number] score: 5 points (age +2, CHF history +1, HTN history +1, prior NSTEMI +1)  On metop succ 12.5mg daily    - Continue metop    Macrocytic anemia  Hb on presentation 9.3, Baseline Hb ~8-9  .4   B12, folate levels wnl     - monitor CBC    Exam on Day of Discharge  Temp:  [36.3 °C (97.3 °F)-37.2 °C (98.9 °F)] 36.3 °C (97.3 °F)  Heart Rate:  [60-71] 71  Resp:  [16-18] 18  BP: ()/(55-73) 98/55  Physical Exam  Constitutional:       General: He is awake.      Appearance: He is ill-appearing.   Cardiovascular:      Rate and Rhythm: Normal rate and regular rhythm.   Pulmonary:      Effort: Pulmonary effort is normal.      Breath sounds: Normal breath sounds.   Abdominal:      General: Abdomen is flat. Bowel sounds are normal.      Palpations: Abdomen is soft.   Skin:     General: Skin is warm and dry.   Neurological:      Mental Status: He is alert.      Cranial Nerves: Dysarthria present.      Comments: Garbled speech.  Psychiatric:         Behavior: Behavior is cooperative.     DISCHARGE MEDICATIONS         Medication List        CONTINUE taking these medications      aspirin 81 mg enteric coated tablet  Take 81 mg by mouth daily. 9 am  Dose: 81 mg     buPROPion  mg 24 hr tablet  Commonly known as: WELLBUTRIN XL  Take 150 mg by mouth daily.  Dose: 150 mg     calcium (as carbonate) 200 mg calcium (500 mg) chewable tablet  Commonly known as: TUMS  Take 1 tablet by mouth daily. 11 am  Dose: 1 tablet     carboxymethylcellulose 0.5 % dropperette  Commonly known as: REFRESH PLUS  Administer 1 drop into both eyes every2 (two) hours while awake.  Dose: 1 drop     cranberry extract 425 mg capsule  Take 425 mg by mouth daily. 5 pm  Dose: 425 mg     cyanocobalamin 500 mcg tablet  Commonly known as: VITAMIN B12  Take 500 mcg by mouth every morning. 9 am  Dose: 500 mcg     ferrous sulfate 325 mg (65 mg iron) tablet  Take 65 mg by mouth daily with breakfast. 11 am  Dose: 65 mg     finasteride  "5 mg tablet  Commonly known as: PROSCAR  Take 5 mg by mouth daily. 9 am  Dose: 5 mg     levothyroxine 100 mcg tablet  Commonly known as: SYNTHROID  Take 100 mcg by mouth daily before breakfast. 7 am  Dose: 100 mcg     LUMIGAN 0.01 % ophthalmic drops  Administer 1 drop into the left eye nightly.  Dose: 1 drop  Generic drug: bimatoprost     melatonin 10 mg capsule  Take 10 mg by mouth nightly. 9 pm  Dose: 10 mg     methenamine 1 gram tablet  Commonly known as: HIPREX  Take 1 g by mouth daily before lunch. 11 am  Dose: 1 g     metoprolol succinate XL 25 mg 24 hr tablet  Commonly known as: TOPROL-XL  Take 12.5 mg by mouth daily.  Dose: 12.5 mg     omeprazole 20 mg capsule  Commonly known as: PriLOSEC  Take 20 mg by mouth daily. 11 am  Dose: 20 mg     tamsulosin 0.4 mg capsule  Commonly known as: FLOMAX  Take 0.4 mg by mouth every morning. 9 am  Dose: 0.4 mg     thiamine HCl 100 mg tablet  Commonly known as: VITAMIN B1  Take 100 mg by mouth every morning. 9 am  Dose: 100 mg     torsemide 10 mg tablet  Commonly known as: DEMADEX  Take 20 mg by mouth 2 (two) times a day. 9 am  Dose: 20 mg               INSTRUCTIONS AND FOLLOW-UP     Instructions for after discharge       Call provider for:  difficulty breathing      Call provider for:  extreme fatigue      Call provider for:  persistent dizziness or light-headedness, headache, or visual disturbances      Call provider for:  persistent nausea or vomiting      Call provider for:  severe uncontrolled pain      Call provider for:  temperature >100.4      Call provider for: increased shortness of breath; weight gain (3 pounds/1 day or 5 pounds/1 week); more swelling in legs, ankles, feet or belly; need to sleep with extra pillows or in a chair.      Discharge Diet      Diet Type / Texture: Regular    Normal Activity as Tolerated      Review additional discharge directions in \"Instructions\" section on the last page      Ambulatory referral to Urology      Marcello Wade MD "   [phone]    [facility]  [address]  [address]  [place] [id number]               Important Issues to Address in Follow-Up  Discharge Follow-up Issues: Instructions to Patient:   Additional Discharge Instructions    Mr. VERONICA SPRINGER,    You were admitted to [facility] on 4/2/2025 for garbled speech and confusion after your thoracentesis (procedure to remove fluid around your lung). We believe the speech and mental status changes were due to the fluid buildup. In addition, your speech is typically worse in the early morning and late evening, which may have a component of age-related delirium. We also initially suspected you had a urinary tract infection and started you on broad spectrum antibiotics. However, your urine culture resulted negative; thus, we discontinued the antibiotics. We also performed an ultrasound scan of your kidneys and bladder, it showed chronic urinary obstruction, but no new changes.     We recommend you continue to get your regularly scheduled outpatient thoracentesis for fluid removal as recommended by your doctor.     You will continue to have home visits by our palliative care team on what we call a palliative bridge. You are able to transition to hospice at any time through the palliative care team.      Medications:  -  Continue your medications as prescribed. We are not starting any new medications.    Follow up:  - Follow up with your primary care physician (PCP) in 1-2 weeks.  - Follow up with home-based palliative care  - Follow up with urology for your history of urinary obstruction      Please review all discharge instructions as discussed.  Please be re-evaluated by your primary doctor as soon as possible and be sure to review all laboratory, radiology and other testing with your doctor.  Should your symptoms worsen or not improve, return to emergency room immediately.    We wish you the best of health!            Scheduled  Appointments            In 4 days HEIDI Mach[facility]            Recommended Follow-up Visits  Follow-Up After Discharge       [facility] Emergency Department   Specialty: Emergency Medicine        Follow up    Service: If symptoms worsen    Zachery Hernandez MD   Specialty: Internal Medicine, Primary Care   Relationship: PCP - General        Follow up    Service: If symptoms worsen    Marcello Wade MD   Specialty: Urology   Relationship: Consulting Physician        Schedule an appointment as soon as possible for a visit in 1 week(s)    Marcello Wade MD   Specialty: Urology   Relationship: Consulting Physician        Follow up    Consultation    Follow up and Treat            Test Results Pending at Discharge  Pending Inpatient Labs       Order Current Status    Blood Culture Blood, Venous Preliminary result    Blood Culture Blood, Venous Preliminary result            LABS AND IMAGING   Pertinent Labs  CHEMISTRIES   Results from last 7 days   Lab Units 04/04/25  0636 04/03/25  0332 04/02/25  1006 04/02/25  1006   SODIUM mEQ/L 139 138  --  138   POTASSIUM mEQ/L 4.2 4.2  --  4.3   CHLORIDE mEQ/L 109* 105  --  106   CO2 mEQ/L 22 21  --  25   BUN mg/dL 29* 29*  --  30*   CREATININE mg/dL 1.5* 1.5*  --  1.6*   GLUCOSE mg/dL 93 89  --  110*   CALCIUM mg/dL 7.9* 8.2*  --  8.2*   EGFR mL/min/1.73m*2 43.1* 43.1*  --  39.9*   ANION GAP mEQ/L 8 12  --  7   MAGNESIUM mg/dL 1.9 1.9   < >  --     < > = values in this interval not displayed.     HEPATIC FUNCTION TESTS   Results from last 7 days   Lab Units 04/02/25  1006   AST IU/L 29   ALT IU/L 6*   ALK PHOS IU/L 94   ALBUMIN g/dL 2.8*   PROTEIN TOTAL g/dL 7.2   BILIRUBIN TOTAL mg/dL 0.8     CBC RESULTS   Results from last 7 days   Lab Units 04/04/25  0636 04/03/25  0332 04/02/25  1006   WBC K/uL 4.29 5.54 5.25   HEMOGLOBIN g/dL 8.2* 9.8* 9.3*   HEMATOCRIT % 25.6* 31.0* 29.2*   PLATELETS K/uL 104* 125* 111*     ANEMIA  STUDIES   Results from last 7 days   Lab Units 04/02/25  1006   FOLATE ng/mL 15.0   VITAMIN B 12 pg/mL 938*     MICROBIOLOGY   Microbiology Results       Procedure Component Value Units Date/Time    Blood Culture Blood, Venous [[id number]]  (Normal) Collected: 04/02/25 1834    Specimen: Blood, Venous Updated: 04/03/25 2001     Culture No growth at 18-24 hours    Blood Culture Blood, Venous [[id number]]  (Normal) Collected: 04/02/25 1834    Specimen: Blood, Venous Updated: 04/03/25 2001     Culture No growth at 18-24 hours    Urine culture Urine, Clean Catch [[id number]]  (Normal) Collected: 04/02/25 1440    Specimen: Urine, Clean Catch Updated: 04/03/25 1101     Urine Culture No Growth (Limit of detection 1000 cfu/mL)            Pertinent Imaging  ULTRASOUND KIDNEYS / BLADDER  Result Date: 4/3/2025  IMPRESSION: 1.  Findings of medical renal disease. 2.  Nonobstructing left renal stone.  No hydronephrosis. 3.  Marked benign prostatic hypertrophy. 4.  Diffuse urinary bladder wall irregularity and multiple bladder diverticula, consistent with sequela of chronic bladder obstruction 5.  Incidentally noted right upper quadrant ascites.     IR THORACENTESIS  Result Date: 4/2/2025  IMPRESSION: Successful ultrasound-guided left thoracentesis with 1 L removed and discarded. I certify that I have personally reviewed this examination and agree with Betsy Benitez's report. Elio Espinoza M.D.    CT CHEST WITHOUT IV CONTRAST  Result Date: 4/2/2025  IMPRESSION: Bilateral pleural effusions both of which are smaller as compared to CT exam of February 2025. Bilateral lower lobe consolidative opacities similar to somewhat improved since prior exam.  Atelectasis versus pneumonia. There are stable compression fractures of the T5 and L1 vertebral bodies. Stable cardiomegaly.    X-RAY CHEST 1 VIEW  Result Date: 4/2/2025  IMPRESSION: See above.     CT HEAD STROKE ALERT WITHOUT IV CONTRAST  Result Date: 4/2/2025  IMPRESSION: 1. No acute  intracranial abnormality. Finding:    Stroke alert   Acuity: Critical  Status:  CLOSED Critical read back was performed. These findings were communicated by Dr. Ludwig Mena and acknowledged by Dr. Maldonado at 10:20 AM on 4/2/2025.     IR THORACENTESIS  Result Date: 3/14/2025  IMPRESSION: Successful ultrasound-guided left thoracentesis with 800 mL removed and discarded. I certify that I have personally reviewed this examination and agree with Betsy Benitez's report. Elio Espinoza M.D.    X-RAY CHEST 1 VIEW  Result Date: 3/14/2025  IMPRESSION: 1.  No pneumothorax status post left-sided thoracentesis 2.  Residual left-sided pleural fluid with left lower lobe atelectasis and or consolidation with opacification within the left midlung zone.     X-RAY CHEST 1 VIEW  Result Date: 3/14/2025  IMPRESSION: 1.  Small residual left-sided pleural effusion at the left lung base and along lateral aspect left hemithorax with left lower lobe atelectasis and or consolidation. 2.  No radiographically evident pneumothorax 3.  Trace right-sided pleural effusion with likely atelectasis the right lung base     Cardiac Imaging    TRANSTHORACIC ECHO (TTE) COMPLETE 09/24/2024    Interpretation Summary  Technically difficult study.    The left ventricular cavity size is small with mild left ventricular hypertrophy and mildly reduced systolic function. Estimated EF 40-45% by visual estimate. Abnormal septal motion due to RV volume overload. Unable to assess diastolic function due to technically difficult study. Low stroke volume (32 ml).    The aortic valve is tricuspid. Aortic valve and root sclerosis. Mild aortic regurgitation.    The mitral valve is thickened. Mild mitral annular calcification. Trace regurgitation.    The left atrium is normal in size.    The right ventricular cavity is massively dilated with severely decreased systolic function. Apical hypercontractility.    The pulmonic valve is not well seen. There is trace pulmonic  regurgitation.    There is a 13 mm coaptation gap between the leaflets of the tricuspid valve resulting in torrential ( wide -open) tricuspid regurgitation (PISA EROA 2.4cm2, Rvol 96 mL). Tricuspid valve annulus is dilated and measures 6.8 cm in diameter. Unable to assess RVSP in the setting of torrential tricuspid regurgitation.    The right atrium is severely dilated.    The IVC is massively dilated and collapses less than 50% with inspiration consistent with severely elevated right atrial pressure.    No pericardial effusion. Large pleural effusion.    Compared to previous echocardiogram from 3/28/2024, no significant change.      OTHER SERVICES PROVIDED   Consults During Admission  IP CONSULT TO INFECTIOUS DISEASE  IP CONSULT TO PAIN/PALLIATIVE CARE    Procedures Performed  None      Thank you for allowing the Division of Hospital Medicine to care for your patient.        >30 mins spent for coordination/counselling related to discharge plan, including final examination of patient, discussion regarding discharge instructions, reconciliation/prescription of medications, preparation of discharge records, etc.

## 2025-04-03 NOTE — ASSESSMENT & PLAN NOTE
Follows with Dr. Jeffrey at [facility]  Causes recurrent left sided plural effusions requiring serial thoracentesis  TTE 09/2024 EF 40 to 45%, abnormal septal motion due to RV volume overload, unable to assess diastolic function. 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  CHF has been complicated by pleural effusions, saw Dr. Gregg outpatient no plan for PLeuRx     - Continue torsemide  - Daily weights, strict I's/O  -in AM ask IR about more fluid removal this admission

## 2025-04-03 NOTE — PLAN OF CARE
Problem: Adult Inpatient Plan of Care  Goal: Plan of Care Review  4/3/2025 1632 by Andria Bullock, RN  Outcome: Progressing  Flowsheets (Taken 4/3/2025 1632)  Progress: no change  Outcome Evaluation: pt aaox2-3, vss, oob to chair x2 stand and pivot, turned q2hrs, reports no pain, colostomy emptied, comfort feeds started, call bell within reach  Plan of Care Reviewed With: patient  4/3/2025 1536 by Andria Bullock, RN  Outcome: Progressing  Flowsheets (Taken 4/3/2025 1536)  Progress: no change  Outcome Evaluation: pt aldoox2[place]3, vss oob to  Plan of Care Reviewed With: patient     Problem: Adult Inpatient Plan of Care  Goal: Patient-Specific Goal (Individualized)  Outcome: Progressing  Flowsheets (Taken 4/3/2025 1632)  Individualized Care Needs: emptied/cleaned colostomy area   Plan of Care Review  Plan of Care Reviewed With: patient  Progress: no change  Outcome Evaluation: pt aaox2-3, vss, oob to chair x2 stand and pivot, turned q2hrs, reports no pain, colostomy emptied, comfort feeds started, call bell within reach

## 2025-04-03 NOTE — PROGRESS NOTES
I called patient's son and provided a brief update on patient's clinical status. Answered all questions to the best of my ability.      Son stated family would prefer he be discharged home since he already has a 24/7 caretaker, even if he is recommended for SNF by PT/OT. The caretaker would be available as early as tomorrow evening. He stated that his brother could transport the patient after ~3pm tomorrow.    Melissa Beltrán MD MPH  PGY1 Internal Medicine Resident  Pager: #[phone]

## 2025-04-03 NOTE — PLAN OF CARE
Care Coordination Admission Assessment Note    General Information:  Readmission Within the last 30 days: previous discharge plan unsuccessful  Does patient have a : No  Patient-Specific Goals (include timeframe): Pt to clear and out of the infection.    Living Arrangements:  Arrived From: home  Current Living Arrangements: home  People in Home: other (see comments)  Home Accessibility:    Living Arrangement Comments: Pt lives at home and has a 24 hour home care giver with [employer].  Pt's family pays privately for services.    Housing Stability and Utility Access (SDOH):  In the last 12 months, was there a time when you were not able to pay the mortgage or rent on time?: No  In the past 12 months, how many times have you moved?: 0  At any time in the past 12 months, were you homeless or living in a shelter (including now)?: No  In the past 12 months has the electric, gas, oil, or water company threatened to shut off services in your home?: Yes    Functional Status Prior to Admission:   Assistive Device/Animal Currently Used at Home: hospital bed, walker, front-wheeled, ramps, wheelchair, scale  Functional Status Comments: Pt is completely dependent of all ADL and IADL support.  IADL Comments: Pt is completely dependent of all ADL and IADL support.     Supports and Services:  Current Outpatient/Agency/Support Group: none  Type of Current Home Care Services: nursing  History of home care episode or rehab stay: Pt has had skilled HHC with [facility] and has stayed at a SNF [facility]    Discharge Needs Assessment:   Concerns to be Addressed: care coordination/care conferences, discharge planning  Current Discharge Risk: none  Anticipated Changes Related to Illness: none    Patient/Family Anticipated Discharge Plan:  Patient/Family Anticipates Transition To: home, home with family, home with help/services  Patient/Family Anticipated Services at Transition: home health care    Connection to  Community  Not applicable    Patient Choice:   Offered/Gave Vendor List: yes  Patient and/or patient guardian/advocate was made aware of their right to choose a provider. A list of eligible providers was presented and reviewed with the patient and/or patient guardian/advocate in written and/or verbal form. The list delineates providers in the patients desired geographic area who are participating in the Medicare program and/or providers contracted with the patients primary insurance. The Medicare list and quality ratings were obtained from the Medicare.gov [medicare.gov] website.    Anticipated Discharge Plan:  Met with patient. Provided education and contact information for Care Coordination services.: yes  Anticipated Discharge Disposition: home with home health, home with assistance  Type of Home Care Services: home OT, home PT    Transportation Needs (SDOH):  Transportation Concerns: none  Transportation Anticipated: family or friend will provide  Is Out of Hospital DNR needed at discharge?: yes    In the past 12 months, has lack of transportation kept you from medical appointments or from getting medications?: No  In the past 12 months, has lack of transportation kept you from meetings, work, or from getting things needed for daily living?: No    Concerns - comments: Per discharge planning rounds, Pt will possibly be stable for d/c TBD vs. Sunday. Pt is a 89-year-old, white male admitted to the hospital with a dx of Dysarthria. PCP, address, emergency contact information, and Pharmacy, were verified with Marko Elena Pt's son, [phone].  Pt's family will facilitate transportation at time of d/c.  SW will continue to monitor and provide emotional support until discharge is complete. MARGARITA, MSW, LSW.

## 2025-04-03 NOTE — PROGRESS NOTES
Occupational Therapy -  Initial Evaluation     Patient: Samy Elena  Location: [facility]  MRN: [id number]  Today's date: 4/3/2025    HISTORY OF PRESENT ILLNESS     Samy is a 89 y.o. male admitted on 4/2/2025 with Word finding difficulty [R47.89]  Acute encephalopathy [G93.40]  Complicated UTI (urinary tract infection) [N39.0]  CKD stage 3a, GFR 45-59 ml/min (CMS/HCC) [N18.31]. Principal problem is Dysarthria.    Past Medical History  Samy has a past medical history of Aneurysm of internal iliac artery (CMS/[facility]), Atrial fibrillation (CMS/[facility]), CHF (congestive heart failure) (CMS/[facility]ly [facility]), 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[facility]), Coronary artery disease, Disease of thyroid gland, Will catheter in place, GI (gastrointestinal bleed), Hypertension, Myocardial infarction (CMS/[facility]), Orthostatic hypotension, and TIA (transient ischemic attack).    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 Pt was at IR 4/2 getting a thoracentesis procedure. Following the procedure, staff noted slurred speech prompting a stroke alert to be called. STAT CT head was negative for acute abnormalities and his abnormal speech has since resolved however his son notes that pt seems more confused than his baseline. Per discussion with his son, the speech difficulty actually started at home over the last couple of days prior to the thoracentesis. He says this often occurs when pt has re-accumulation of his pleural effusion. He had had confusion in the past when he has had UTIs.   PRIOR LEVEL OF FUNCTION AND LIVING ENVIRONMENT     Prior Level of Function      Flowsheet Row Most Recent Value   Dominant Hand right   Ambulation completely dependent    Transferring assistive person   Toileting assistive person   Bathing assistive person   Dressing assistive person   Eating independent   IADLs assistive person   Driving/Transportation friends/family   Communication understands/communicates without difficulty   Prior Level of Function Comment pt has 24/7 A from HHA and son, assist for transfers and ADL. has w/c for dependent mobility.   Assistive Device Currently Used at Home hospital bed, walker, front-wheeled, ramps, wheelchair, scale             Prior Living Environment      Flowsheet Row Most Recent Value   People in Home other (see comments)   Current Living Arrangements home   Living Environment Comment rampt to enter, FFSU, tub shower.          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   Environmental Supports and Barriers has 24/7 Ax2          VITALS AND PAIN     OT Vitals      Date/Time Pulse HR Source SpO2 Pt Activity O2 Therapy BP BP Location BP Method Pt Position [facility]   04/03/25 1057 66 Monitor 95 % At rest None (Room air) 114/61 Right upper arm Automatic Lying [facility]   04/03/25 1115 64 -- -- -- -- 116/64 -- -- -- BS   04/03/25 1119 69 Monitor 97 % At rest None (Room air) 116/64 Right upper arm Automatic Sitting [facility]          OT Pain      Date/Time Pain Type Side/Orientation Rating: Rest Rating: Activity Interventions [facility]   04/03/25 1057 Pain Assessment abdomen 4 - moderate pain 4 - moderate pain care clustered [facility]             Objective   OBJECTIVE     Start time:  1055  End time:  1122  Session Length: 27 min  Mode of Treatment: co-treatment  Reason for co-treatment: expedite d/c, skilled Ax2    General Observations  Patient received reclined, in bed. He was agreeable to therapy, no issues or concerns identified by nurse prior to session.      Precautions: fall, contact, NPO       Limitations/Impairments: safety/cognitive, hearing   Services  Do You Speak a  Language Other Than English at Home?: no      OT Eval and Treat - 04/03/25 1055          Cognition    Orientation Status oriented x 3     Affect/Mental Status WFL     Follows Commands WFL     Cognitive Function executive function deficit;safety deficit     Executive Function Deficit minimal deficit;judgment;insight/awareness of deficits;problem-solving/reasoning;self-monitoring/self-correction     Safety Deficit minimal deficit;judgment;safety precautions awareness;problem-solving;safety precautions follow-through/compliance;insight into deficits/self-awareness;awareness of need for assistance     Comment, Cognition impaired insight noted. speech garbled, pt requesting thin liquids despite beig NPO. reports coughing when drinking liquids. Pt requires cues for safety. poor insight noted c pt missing urinal during urination. Pt rpeorts  havng A at home AAT        Vision Assessment/Intervention    Vision Assessment corrective lenses full-time        Hearing Assessment    Hearing Status hearing aid, bilateral        Sensory Assessment    Sensory Assessment sensation intact, upper extremities        Upper Extremity Assessment    UE Assessment ROM and Strength WFL     General Observations 3+/5        Bed Mobility    Bed Mobility Activities supine to sit     [place] moderate assist (50-74% patient effort);2 person assist     Safety/Cues increased time to complete;technique;hand placement     Assistive Device bed rails;head of bed elevated        Mobility Belt    Mobility Belt Used During Session no - medical contraindication     Reason Mobility Belt Not Used ostomy        Sit/Stand Transfer    Surface edge of bed;chair with arm rests     [place] moderate assist (50-74% patient effort);2 person assist     Safety/Cues increased time to complete;technique;hand placement;preparatory posture;sequencing     Assistive Device none     Transfer Comments B/L knee block and anteriolateral A.        Surface-to-Surface  Transfers    Transfer Location bed to chair     Transfer Technique stand pivot     [place] maximum assist (25-49% patient effort);2 person assist     Safety/Cues increased time to complete;hand placement;technique     Assistive Device none     Transfer Comments pt reports he has Ax2 at baseline for transfers        Lower Body Dressing    Tasks socks     [place] dependent (less than 25% patient effort)     Safety/Cues increased time to complete     Position edge of bed sitting     Adaptive Equipment none     Comment rperots having A at baseline        Toileting    [place] dependent (less than 25% patient effort)     Safety/Cues problem-solving     Position supported sitting     Setup Assistance adaptive equipment setup;obtain supplies     Adaptive Equipment urinal     Comment +ostomy. Pt attempts to urinate into urinal, however missed. Requires Mod Ax2 STS to change pads and complete hygiene. Pt rpeorts having A at home for toileting        Balance    Static Sitting Balance mild impairment     Dynamic Sitting Balance mild impairment     Sit to Stand Dynamic Balance moderate impairment     Static Standing Balance moderate impairment     Dynamic Standing Balance moderate impairment     Comment, Balance initiallty Mod A sitting EOB, progresaed to Min A sitting EOB. L lateral/posterior lean. pt reports this is basleline?        Impairments/Safety Issues    Impairments Affecting Function balance;endurance/activity tolerance;postural/trunk control;strength     Functional Endurance fair-                                              Education Documentation  Treatment Plan, taught by Michelle Haas OT at 4/3/2025  3:07 PM.  Learner: Patient  Readiness: Acceptance  Method: Explanation  Response: Verbalizes Understanding  Comment: benefits/roole of OT, in room safety including call light use, ADL adaptations, functional transfers.        Session Outcome  Patient upright, in chair at end of session, all  needs met, call light in reach, personal items in reach, chair alarm on. Nursing notified about change in vital signs, patient's performance, patient's position, and patient's response to therapy/activity.    AM-PAC - ADL (Current Function)     Putting on/taking off regular lower body clothing 1 - Total   Bathing 2 - A Lot   Toileting 1 - Total   Putting on/taking off regular upper body clothing 3 - A Little   Help for taking care of personal grooming 4 - None   Eating meals 4 - None   AM-PAC ADL Score 15      ASSESSMENT AND PLAN     Progress Summary  OT IE complete. AMPA 15. Pt reports having Ax2 at home from son and HHA 24/7 for ADL and functional transfers. Has AD/DME required c FFSU. Requires Mod Ax2 bed mob, Mod Ax 2 STS, Max Ax2 SPT, total A LB dressing/toileting. Pt reports no further questoins/concerns at this time regarding OT. skilled OT services not indicated at acute care level at this time due to pt appearing to be at baseline functioning and has adequate A at home. D/c OT. Please reconsult if pt is not at current functional baseline.    Patient/Family Therapy Goal Statement: home    OT Plan      Flowsheet Row Most Recent Value   Rehab Potential good, to achieve stated therapy goals at 04/03/2025 1055   Therapy Frequency evaluation only at 04/03/2025 1055            OT Discharge Recommendations      Flowsheet Row Most Recent Value   OT Recommended Discharge Disposition home with assistance  [has 24/7 A x2 per pt report] at 04/03/2025 1055   Anticipated Equipment Needs if Discharged Home (OT) none at 04/03/2025 1055

## 2025-04-03 NOTE — ASSESSMENT & PLAN NOTE
Hx of prior atrial fibrillation  Home regimen Not on AC due to history of GI bleed.  Follows with Dr. Roshan Jeffrey at [facility]  [id number] score: 5 points (age +2, CHF history +1, HTN history +1, prior NSTEMI +1)     - monitor on tele  -Heparin for DVT

## 2025-04-03 NOTE — PROGRESS NOTES
Patient: Samy Elena  Location: [place] [address]  MRN:  [id number]  Today's date:  4/3/2025    Attempted to see patient for therapy.  However, patient with significant dysphagia history, last VFSS completed on 7/29/24 with findings significant for  moderate oral phase, profound pharyngeal phase with delayed swallow initiation, significant pharyngeal dysmotility and silent aspiration of thin, mildly thick, moderately thick and purée solid textures.  There is no sensory response to any aspiration event.  Since this time, patient opted to consume PO with known risk for aspiration, however, has also been presenting to IR approximately 1-2 times a month since July 2024 for thoracentesis with approximately ~1 L of fluid removed and discarded each time. SLP spoke with medical team via phone this am to clarify SLP role and ongoing GOC to determine if further SLP workup is indicated. Awaiting medical team discussion with family. Please maintain aspiration precautions in the interim.

## 2025-04-03 NOTE — PROGRESS NOTES
Internal Medicine  Daily Progress Note       Assessment & Plan  Dysarthria  Complicated UTI (urinary tract infection)  Hx of previous EBSL E. Coli + UTI resistant to multiple cephalosporins, bactrim and fluroquinolines in October 2024  Pt complaining of polyuria urinary frequency  UA with cloudy color, +3 LE, WBC TNTC, 1+ bacteria    No CVA tenderness on exam  S/p 500mg NS bolus and 1g ertapenem in the ED    - F/u ucx  - Continue ertapenem, narrow with sens/spec----deescalate to Augmentin if same as before  - fu ID consult  - fu SLP consult: seen by SLP in 7/2024, failed VFSS, family w/ palliative elected for comfort feeds. Per SLP, patient would need repeat VFSS for eval.  - Need to clarify GOC with patient and family, including VFSS.  - Consult palliative care  - Consider CT A/P with and without contrast if pt were to decompensate, develop oliguria to r/o pyelonephritis, calculi, abscesses, gas-forming infections, obstruction.  HFrEF (heart failure with reduced ejection fraction) (CMS/HCC)  Follows with Dr. Jeffrey at [facility]  Causes recurrent left sided plural effusions requiring serial thoracentesis  TTE 09/2024 EF 40 to 45%, abnormal septal motion due to RV volume overload, unable to assess diastolic function. 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  CHF has been complicated by pleural effusions, saw Dr. Gregg outpatient no plan for PLeuRx     - HOLD torsemide iso infection  - Daily weights, strict I's/O  - s/p scheduled thoracentesis with 1L removal on 4/2. Ask IR about more fluid removal this admission  CAD (coronary artery disease)  History of CABG in 1996  No recent stress test or cath available on file  Previously on plavix and  aspirin; Plavix had been discontinued over the last several months due to recurrent GI bleeds.    - Continue aspirin 81 mg daily  Hypertension  Normotensive on admission  Home medication: torsemide 20 mg daily    - HOLD torsemide iso infection  Hypothyroidism  TSH 6.26 on 05/03/2024.  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  Follows with Dr. Roshan Jeffrey at [facility]  [id number] score: 5 points (age +2, CHF history +1, HTN history +1, prior NSTEMI +1)     - monitor on tele  -Heparin for DVT   Macrocytic anemia  Hb on presentation 9.3, Baseline Hb ~8-9  .4   B12, folate levels wnl    - monitor CBC    SUBJECTIVE   This is a 89 y.o. year-old male admitted on 4/2/2025 with Word finding difficulty [R47.89]  Acute encephalopathy [G93.40]  Complicated UTI (urinary tract infection) [N39.0]  CKD stage 3a, GFR 45-59 ml/min (CMS/[facility]) [N18.31].    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 Stroke alert called, CT head negative. Slurred speech resolved. Per son, pt more confused than his baseline and has had ongoing speech difficulty at home over last couple days prior to thoracentesis. Son stated this often occurs when pt has re-accumulation of pleural effusion or when he has UTIs. Patient has had  recent admissionsAdmitted for management of metabolic encephalopathy 2/2 UTI.    Pertinent objective findings upon presentation: Borderline low BP immediately after the procedure, resolved. Mild bradycardia. UA with TNTC WBC/3+ LE. CT chest w/ improvement in bilateral pleural effusions compared to Feb/2025.   In ED, received  cc, 1g ertapenem.     Also admitted 7/2024 for metabolic encephalopathy 2/2 UTI and dysarthria. Failed VFSS. Family elected for comfort feeds.    Interval History: This AM, patient with slurred speech but I could understand him. He was alert and oriented to name, place, and year. He was able to tell me his medical history, including his GIB, GI surgeries, hx of slurred speech for several months, hx of seeing speech pathologist, hx of UTI. He denies any new complaints except feels that is throat is raspy.    Nursing alerted me that patient had small amount of hematuria while peeing in urinal. Patient denied dysuria during my interview.     OBJECTIVE   Vital signs in last 24 hours:  Temp:  [36.1 °C (97 °F)-36.5 °C (97.7 °F)] 36.4 °C (97.5 °F)  Heart Rate:  [52-67] 64  Resp:  [12-37] 16  BP: ()/(50-79) 112/52  SpO2:  [91 %-98 %] 91 %  Oxygen Therapy: None (Room air)    Weight & I/Os:  Weights (last 5 days)       Date/Time Weight    04/02/25 10:47:20 78.2 kg (172 lb 8 oz)    04/02/25 1043 78.2 kg (172 lb 8 oz)            Intake/Output Summary (Last 24 hours) at 4/3/2025 0659  Last data filed at 4/3/2025 0645  24 Hour Net Input/Output from 7AM Yesterday   Intake 580 ml   Output 1150 ml   Net -570 ml        PHYSICAL EXAMINATION   Physical Exam  Constitutional:       General: He is awake.      Appearance: He is ill-appearing.   HENT:      Mouth/Throat:      Mouth: Mucous membranes are dry.   Cardiovascular:      Rate and Rhythm: Normal rate and regular rhythm.   Pulmonary:      Effort: Pulmonary effort is normal.      Breath sounds: Normal breath sounds.   Abdominal:      General: Abdomen  is flat. Bowel sounds are normal.      Palpations: Abdomen is soft.   Genitourinary:     Comments: Hemorrhagic crust on prepuce. Petroleum jelly applied to glans.  Skin:     General: Skin is warm and dry.   Neurological:      Mental Status: He is alert. Mental status is at baseline.      Cranial Nerves: Dysarthria present.      Sensory: No sensory deficit.      Motor: No weakness.      Comments: Dysarthric but able to comprehend most of his speech   Psychiatric:         Behavior: Behavior is cooperative.          LINES, CATHETERS, DRAINS, AIRWAYS, & WOUNDS   Lines, drains, airways, & wounds:  Peripheral IV (Adult) 04/02/25 Anterior;Proximal;Right Forearm (Active)   Number of days: 1       Peripheral IV (Adult) 04/02/25 Left Antecubital (Active)   Number of days: 1        LABS, IMAGING, & TELE   Labs:  I have reviewed all pertinent labs.    Results from last 7 days   Lab Units 04/03/25  0332 04/02/25  1006   WBC K/uL 5.54 5.25   HEMOGLOBIN g/dL 9.8* 9.3*   HEMATOCRIT % 31.0* 29.2*   PLATELETS K/uL 125* 111*       Results from last 7 days   Lab Units 04/03/25  0332 04/02/25  1006   SODIUM mEQ/L 138 138   POTASSIUM mEQ/L 4.2 4.3   CHLORIDE mEQ/L 105 106   CO2 mEQ/L 21 25   BUN mg/dL 29* 30*   CREATININE mg/dL 1.5* 1.6*   CALCIUM mg/dL 8.2* 8.2*   ALBUMIN g/dL  --  2.8*   BILIRUBIN TOTAL mg/dL  --  0.8   ALK PHOS IU/L  --  94   ALT IU/L  --  6*   AST IU/L  --  29   GLUCOSE mg/dL 89 110*       Imaging personally reviewed (does not include unread studies):  IR THORACENTESIS  Result Date: 4/2/2025  CLINICAL HISTORY: Recurrent shortness breath. COMMENT: The patient was referred for ultrasound-guided thoracentesis on the left side.  Informed consent was obtained.  With the patient laying right lateral decubitus, ultrasound imaging was performed and a large size pleural effusion was identified.  The fluid was localized and a site was selected on the skin with ultrasound.  The site was marked.  Using sterile technique, under  local anesthesia, a 4-Irish valved Yueh catheter was inserted into the pleural space. 1 L of serous sanguinous fluid were removed.  The catheter was removed and a sterile dressing was placed over the catheter insertion site. A large pleural effusion remained post procedure.  This procedure was discontinued prior to complete evacuation of the effusion because of patient discomfort.  The fluid was discarded.  The patient tolerated the procedure well. An erect frontal chest film was obtained immediately following the procedure and will be separately reported. This service rendered by Betsy Benitez PA-C     IMPRESSION: Successful ultrasound-guided left thoracentesis with 1 L removed and discarded. I certify that I have personally reviewed this examination and agree with Betsy Benitez's report. Elio Espinoza M.D.    CT CHEST WITHOUT IV CONTRAST  Result Date: 4/2/2025  CLINICAL HISTORY: Worsening shortness of breath, 89-year-old male COMMENT: Unenhanced CT of the chest is performed. CT DOSE:  One or more dose reduction techniques (e.g. automated exposure control, adjustment of the mA and/or kV according to patient size, use of iterative reconstruction technique) utilized for this examination Intravenous contrast was not administered.. Lack of intravenous contrast limits assessment of the solid organs, vasculature and certain other processes. COMPARISON STUDY: Chest CT dated 2/16/2025. FINDINGS: Chest wall and pleura: There are relatively small bilateral pleural effusions left slightly larger than right both decreased in size since previous CT exam. The lung fields: Bilateral posterior lower lobe consolidative opacities atelectasis versus pneumonia.  Biapical pleural-parenchymal scarring. Heart and pericardium: The heart is enlarged.  No pericardial effusion.  There are significant coronary arterial calcifications.  Changes of probable coronary artery bypass graft surgery noted. Chest Wall: Changes of median sternotomy.  Mediastinum and bismark: No gross lymphadenopathy appreciated given limitations of unenhanced imaging. Axilla: No axillary adenopathy. Osseous structures: Stable compression fractures of T5 and L1.     IMPRESSION: Bilateral pleural effusions both of which are smaller as compared to CT exam of February 2025. Bilateral lower lobe consolidative opacities similar to somewhat improved since prior exam.  Atelectasis versus pneumonia. There are stable compression fractures of the T5 and L1 vertebral bodies. Stable cardiomegaly.    X-RAY CHEST 1 VIEW  Result Date: 4/2/2025  CLINICAL HISTORY: s/p left thora PRIOR STUDY: Chest x-ray 3/14/2025 TECHNIQUE: Frontal chest COMMENT:  Sternal wires noted.  There is cardiomegaly with a moderate to large left pleural effusion with underlying airspace disease not excluded.  There is a small right effusion.  No pneumothorax.     IMPRESSION: See above.     CT HEAD STROKE ALERT WITHOUT IV CONTRAST  Result Date: 4/2/2025  CLINICAL HISTORY: Stroke alert. Slurred speech. COMMENT: TECHNIQUE: CT of the head was performed without intravenous contrast. Sagittal and coronal reformations were rendered. CT DOSE:  One or more dose reduction techniques (e.g. automated exposure control, adjustment of the mA and/or kV according to patient size, use of iterative reconstruction technique) utilized for this examination. COMPARISON: CT head 2/16/2025 Findings: No acute intracranial hemorrhage. No extra-axial fluid collection, midline shift or mass effect. No acute transcortical infarction. Probable chronic small vessel ischemic changes of the white matter. Intracranial atherosclerosis. Cerebral volume loss with enlargement of the ventricles and sulci. No focal osseous abnormality. Visualized paranasal sinuses and mastoid air cells are essentially clear.     IMPRESSION: 1. No acute intracranial abnormality. Finding:    Stroke alert   Acuity: Critical  Status:  CLOSED Critical read back was performed. These  findings were communicated by Dr. Ludwig Mena and acknowledged by Dr. Maldonado at 10:20 AM on 4/2/2025.       Telemetry/EKG:  I have reviewed all pertinent telemetry/EKG.     VTE Assessment: Padua    VTE Prophylaxis: Current anticoagulants:  heparin (porcine) 5,000 unit/mL injection 5,000 Units, subcutaneous, q8h IVÁN      Code Status: DNR (A.N.D.)  Estimated discharge date: 4/6/2025     ATTENDING DOCUMENTATION  ALSO SEE ATTENDING ATTESTATION SECTION OF NOTE

## 2025-04-03 NOTE — ASSESSMENT & PLAN NOTE
-in the setting of chronic dysphagia/aspiration risk, recurrent pleural effusion iso CHF   -can consider pleur-x catheter   -morphine concentrated liquid 5 mg SL every 4 hours PRN severe dyspnea-warrants GOC discussion w family

## 2025-04-03 NOTE — ASSESSMENT & PLAN NOTE
89 y.o. male with a PMH of CAD s/p CABG, HFrEF (40-45%), torrential TR, recurrent left sided pleural effusion, CKD who presents from IR with slurred speech following thoracentesis. He is admitted with encephalopathy and UTI.    - Code status: DNR/DNI  - Prognosis:  meets hospice criteria  - Capacity for medical decision making: unreliable  - Advance Directives: not on file in EMR  - Goals of care: Goals are comfort  and QOL based with comfort feeds with priority of returning home   - Surrogate Decision Maker: pt designates his son Marko

## 2025-04-03 NOTE — CONSULTS
Palliative Care Consult      Patient Name: Samy Elena                                                                                        Patient MRN: [id number]  Date / Time Note Created: 4/3/2025 10:53 AM  Attending Physician: Gemma Mckeon MD  Referring Physician: No ref. provider found  Primary Care Physician: Zachery Hernandez MD   This is Hospital Day: 2    Conversation/Goals of Care:  Spoke w son Marko via telephone. He relates his father has had significant mental decline, sleeping moire, and increased weakness the last 2-3 weeks. Marko expressed concern that these changes may be more permanent. He endorses his family has been talking about when the right time is to transition to a comfort/hospice approach. Encouraged Marko to think about what he thinks his father would want for himself if he understood his clinical condition. Palliative home services discharged patient approximately 2 weeks ago. Marko would like to renew palliative home services iso recent decline. He understands that pt can easily transition to hospice. Allowed ample time for questions.     Assessment/Plan  Assessment & Plan  Palliative care by specialist  89 y.o. male with a PMH of CAD s/p CABG, HFrEF (40-45%), torrential TR, recurrent left sided pleural effusion, CKD who presents from IR with slurred speech following thoracentesis. He is admitted with encephalopathy and UTI.    - Code status: DNR/DNI  - Prognosis:  meets hospice criteria  - Capacity for medical decision making: unreliable  - Advance Directives: not on file in EMR  - Goals of care: Goals are comfort  and QOL based with comfort feeds with priority of returning home   - Surrogate Decision Maker: pt designates his son Marko      Debility  - Debility likely multifactorial in the setting of advanced heart failure c/b recurrent pleural effusion, advanced age, chronic dysphagia  - Resides at home w 24 hour caregiver, Melinda -private pay. Pt's 3 sons local and participate  in pt's care.  Baseline functional status: front wheeled walker. Dependent for all ADLS/IADLs.  Frailty:advanced  - Pt remains high risk for further deterioration, functional decline and death  -PPS: Current 40 % Baseline 40%      Plan:  - Continue supportive interventions to treat reversible causes of illness  - resume home palliative bridge and HbNP practice  -comfort feeds, family declining VFSS    Shortness of breath   -in the setting of chronic dysphagia/aspiration risk, recurrent pleural effusion iso CHF   -can consider pleur-x catheter   -morphine concentrated liquid 5 mg SL every 4 hours PRN severe dyspnea-warrants GOC discussion w family      Reason for Consult:  Assistance with clarification of goals of care    HPI      Source: chart review and limited from pt 2/2 confusion, nursing, primary team    Samy Elena is an 89 y.o. 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 Hei s admitted with encephalopathy and UTI. Pt is s/p thoracentesis 1L 4/2. Pt is known to palliative in patient and home teams. Per [facility] practice notes, hospice process and criteria was discussed w/ pt and son last month during home visit.     Pt seen at bedside at 1130. OOB in chair. He is alert and oriented to name and birth date. He does not know where he is. Pt denies pain. He reports he some some SOB that he reports is his baseline. He has a dry mouth and would like some coffee.  Per primary team discussion today w patient son, family would like to renew comfort feeds, they do not wish to repeat VFSS. Family would like pt to be discharged home as soon as possible.          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    Atrial fibrillation (CMS/HCC)     CHF (congestive heart failure) (CMS/HCC)     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)     Coronary artery disease     Disease of thyroid gland     Will catheter in place     6/25 not at present    GI (gastrointestinal bleed)     Hypertension     Myocardial infarction (CMS/HCC)     Orthostatic hypotension     TIA (transient ischemic attack)        Past Surgical History  Past Surgical History   Procedure Laterality Date    cataract extraction right eye with implant and limbal relaxing incision Right 7/18/2024    Performed by Hayder Moses MD at  OR [facility]    Cataract extraction w/ intraocular lens implant Left     Cataract extraction with LRI and anterior vitrectomy left eye Left 11/16/2023    Performed by Hayder Moses MD at  OR [facility]    Cholecystectomy      Colostomy      COLOSTOMY LAPAROSCOPIC N/A 8/3/2022    Performed by Mat Bryant MD at [facility] OR    Coronary artery bypass graft      DIAGNOSTIC FLEXIBLE SIGMOIDOSCOPY WITH COLLECTION OF SPECIMEN BY WASHING N/A 11/27/2024    Performed by David Holcomb MD at [facility] GI    DIAGNOSTIC FLEXIBLE SIGMOIDOSCOPY WITH COLLECTION OF SPECIMEN BY WASHING N/A 10/18/2024    Performed by Maine Lopez MD at [facility]    DIAGNOSTIC UPPER GASTROINTESTINAL ENDOSCOPY WITH COLLECTION OF SPECIMEN BY WASHING N/A 11/27/2024    Performed by David Holcomb MD at [facility]    FLEXIBLE SIGMOIDOSCOPY WITH BIOPSY N/A 10/23/2024    Performed by David Holcomb MD at [facility]    INCISION AND DRAINAGE WITH DEBRIDMENT OF BUTTOCK, AND PERINEUM N/A 8/2/2022    Performed by Mat Bryant MD at [facility] OR    Joint replacement Left     Knee    RIGHT HEART CATH N/A 8/4/2022    Performed by Cristal Lacey MD at [facility] CARDIAC CATH/EP    Thyroidectomy      WASHOUT OF PERINEAL WOUND, COLONIC LAVAGE N/A 8/3/2022    Performed by Mat Bryant MD at [facility] OR       Cheondoism:   Congregational  Scientology / Spiritual:   c/s spiritual care      [place]  [employer] (Outside records) query reviewed with no concerns.    Current Medications:  Current Facility-Administered Medications   Medication Dose Route Frequency Provider Last Rate Last Admin    aspirin enteric coated tablet 81 mg  81 mg oral Daily Patrick Boggs DO   81 mg at 04/02/25 2313    calcium (as carbonate) (TUMS) chewable tablet 200 mg of elemental calcium  200 mg of elemental calcium oral Daily Patrick Boggs DO        carboxymethylcellulose (REFRESH PLUS) 0.5 % ophthalmic dropperette 1 drop  1 drop Both Eyes q2h while awake Patrick Boggs DO   1 drop at 04/03/25 0904    [Provider Managed Hold] clopidogreL (PLAVIX) tablet 75 mg  75 mg oral Daily Patrick Boggs DO        cyanocobalamin (VITAMIN B12) tablet 500 mcg  500 mcg oral q AM Patrick Boggs DO   500 mcg at 04/02/25 2344    glucose chewable tablet 16-32 g of dextrose  16-32 g of dextrose oral PRN Patrick Boggs DO        Or    dextrose 40 % oral gel 15-30 g of dextrose  15-30 g of dextrose oral PRN Patrick Boggs DO        Or    glucagon (GLUCAGEN) injection 1 mg  1 mg intramuscular PRN Patrick Boggs DO        Or    dextrose 50 % in water (D50) injection 12.5 g  25 mL intravenous PRN Patrikc Boggs DO        ertapenem (INVanz) 1 g in sodium chloride 0.9 % 100 mL IVPB  1 g intravenous q24h INT Patrick Boggs DO        ferrous sulfate tablet 325 mg  325 mg oral Daily with breakfast Patrick Boggs DO        finasteride (PROSCAR) tablet 5 mg  5 mg oral Daily Patrick Boggs DO   5 mg at 04/02/25 2313    heparin (porcine) 5,000 unit/mL injection 5,000 Units  5,000 Units subcutaneous q8h IVÁN Patrick Boggs DO   5,000 Units at 04/03/25 0542    latanoprost (XALATAN) 0.005 % ophthalmic solution 1 drop  1 drop Both Eyes Nightly Patrick Boggs DO   1 drop at 04/03/25 0437    levothyroxine  (SYNTHROID) tablet 100 mcg  100 mcg oral Daily before breakfast Patrick Boggs DO        melatonin capsule 10 mg  10 mg oral Nightly Patrick Boggs DO        [Provider Managed Hold] methenamine (HIPREX) tablet 1 g  1 g oral Daily before lunch Patrick Boggs DO        [Provider Managed Hold] metoprolol succinate ER (TOPROL-XL) pre-halved 24 hr ER tablet 12.5 mg  12.5 mg oral Daily Patrick Boggs DO        [Provider Managed Hold] potassium chloride (KLOR-CON M) ER tablet (particles/crystals) 40 mEq  40 mEq oral BID Patrick Boggs DO   40 mEq at 04/02/25 2313    tamsulosin (FLOMAX) 24 hr ER capsule 0.4 mg  0.4 mg oral q AM Patrick Boggs DO   0.4 mg at 04/02/25 2314    thiamine (VITAMIN B1) tablet 100 mg  100 mg oral q AM Patrick Boggs DO   100 mg at 04/02/25 2313    [Provider Managed Hold] torsemide (DEMADEX) tablet 20 mg  20 mg oral BID Patrcik Boggs DO   20 mg at 04/02/25 2313       Allergies:  Allergies   Allergen Reactions    Jardiance [Empagliflozin] Other (see comments)     denise's gangrene         Objective    Physical Exam:     General:   No Acute Distress, frail appearing, cachectic  Eyes:  ancteric sclera  ENMT:  Mucus Membranes Dry  CV:  Radial Pulses intact  Lungs:  Respirations unlabored, intermittent conversational dyspnea  Abdomen:  Soft,NT,ND  Musculosketetal: No deformity  Psych:  Insight poor, not agitated  Neuro:  Oriented to name, birthdate, year, + dysarthra  Skin:  Rash absent      Vitals:  Vitals:    04/03/25 0815   BP: (!) 111/58   Pulse: 65   Resp: 16   Temp: 36.3 °C (97.4 °F)   SpO2: 97%               I have reviewed the patient's pertinent labs to the time of note.  Significant abnormals are :     Hgb 9.8 - anemia  Platelets 125  - thrombocytopenia  Creat 1.5   -CALI/CKD  Albumin 2.8  - hypoalbuminemia    I have independently reviewed the pertinent imaging to the time of note and agree with reported results.  CXR reviewed independently,  agree with reported results. No  AICD/ pacer noted.      I have independently reviewed the pertinent cardiac studies to the time of note and agree with reported results.  EKG reviewed:  Qtc 528      Thank you for the opportunity to participate in this patient's hospital plan of care.     Ailyn Tolbert, HEIDI  Office [phone]

## 2025-04-03 NOTE — PROGRESS NOTES
Physical Therapy -  Initial Evaluation     Patient: Samy Elena  Location: [facility] [address] [address]  MRN: [id number]  Today's date: 4/3/2025    HISTORY OF PRESENT ILLNESS     Samy is a 89 y.o. male admitted on 4/2/2025 with Word finding difficulty [R47.89]  Acute encephalopathy [G93.40]  Complicated UTI (urinary tract infection) [N39.0]  CKD stage 3a, GFR 45-59 ml/min (CMS/HCC) [N18.31]. Principal problem is Dysarthria.    Past Medical History  Samy has a past medical history of Aneurysm of internal iliac artery (CMS/[facility]), Atrial fibrillation (CMS/[facility]), CHF (congestive heart failure) (CMS/[facility]), 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[facility]), Coronary artery disease, Disease of thyroid gland, Will catheter in place, GI (gastrointestinal bleed), Hypertension, Myocardial infarction (CMS/[facility]), Orthostatic hypotension, and TIA (transient ischemic attack).    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 Pt was at IR 4/2 getting a thoracentesis procedure. Following the procedure, staff noted slurred speech prompting a stroke alert to be called. STAT CT head was negative for acute abnormalities and his abnormal speech has since resolved however his son notes that pt seems more confused than his baseline. Per discussion with his son, the speech difficulty actually started at home over the last couple of days prior to the thoracentesis. He says this often occurs when pt has re-accumulation of his pleural effusion. He had had confusion in the past when he has had UTIs.   PRIOR LEVEL OF FUNCTION AND LIVING ENVIRONMENT     Prior Level of Function      Flowsheet Row Most Recent Value   Dominant Hand right   Ambulation completely dependent    Transferring assistive person   Toileting assistive person   Bathing assistive person   Dressing assistive person   Eating independent   IADLs assistive person   Driving/Transportation friends/family   Communication understands/communicates without difficulty   Prior Level of Function Comment pt has 24/7 A from HHA and son, assist for transfers and ADL. has w/c for dependent mobility.   Assistive Device Currently Used at Home hospital bed, walker, front-wheeled, ramps, wheelchair, scale             Prior Living Environment      Flowsheet Row Most Recent Value   People in Home other (see comments)   Current Living Arrangements home   Living Environment Comment rampt to enter, FFSU, tub shower.          VITALS AND PAIN     PT Vitals      Date/Time Pulse HR Source SpO2 Pt Activity O2 Therapy BP BP Location BP Method Pt Position [facility]   04/03/25 1057 66 Monitor 95 % At rest None (Room air) 114/61 Right upper arm Automatic Lying [facility]   04/03/25 1115 64 -- -- -- -- 116/64 -- -- -- BS   04/03/25 1119 69 Monitor 97 % At rest None (Room air) 116/64 Right upper arm Automatic Sitting [facility]          PT Pain      Date/Time Pain Type Location Rating: Rest Rating: Activity Interventions [facility]   04/03/25 1057 Pain Assessment abdomen 4 - moderate pain 4 - moderate pain care clustered [facility]             Objective   OBJECTIVE     Start time:  1054  End time:  1122  Session Length: 28 min  Mode of Treatment: co-treatment  Reason for co-treatment: skilled A x2    General Observations  Patient received reclined, in bed. He was no issues or concerns identified by nurse prior to session, agreeable to therapy. pt lying in bed, asking to get OOB    Precautions: fall, contact, NPO       Limitations/Impairments: safety/cognitive, hearing   Services  Do You Speak a Language Other Than English at Home?: no      PT Eval and Treat - 04/03/25 1122          Cognition    Orientation Status oriented x 3     Affect/Mental Status WFL     Follows  Commands WFL     Cognitive Function executive function deficit     Executive Function Deficit self-monitoring/self-correction;insight/awareness of deficits;planning/decision-making;problem-solving/reasoning     Safety Deficit insight into deficits/self-awareness        Vision Assessment/Intervention    Vision Assessment corrective lenses full-time        Hearing Assessment    Hearing Status hearing aid, bilateral        Sensory Assessment    Sensory Assessment sensation intact, lower extremities        Lower Extremity Assessment    LE Assessment ROM and Strength WFL except        Lower Extremity Strength    Hip, Left (Strength) 2/5     Knee, Left (Strength) 3/5     Ankle, Left (Strength) 4/5     Hip, Right (Strength) 2/5     Knee, Right (Strength) 3/5     Ankle, Right (Strength) 4/5        Motor Skills    Motor Skills postural control        Postural Control Assessment    Issues Impacting Performance impaired perception of midline;impaired motor problem solving        Bed Mobility    Bed Mobility Activities supine to sit     [place] moderate assist (50-74% patient effort);2 person assist     Safety/Cues increased time to complete;verbal cues;tactile cues     Assistive Device bed rails;head of bed elevated        Mobility Belt    Mobility Belt Used During Session no - medical contraindication     Reason Mobility Belt Not Used ostomy        Sit/Stand Transfer    Surface chair with arm rests     [place] moderate assist (50-74% patient effort);2 person assist     Safety/Cues increased time to complete;verbal cues;tactile cues;preparatory posture     Assistive Device none        Surface-to-Surface Transfers    Transfer Location bed to chair     Transfer Technique stand pivot     [place] maximum assist (25-49% patient effort);2 person assist     Safety/Cues increased time to complete     Assistive Device none        Balance    Static Sitting Balance mild impairment;sitting, edge of bed     Dynamic Sitting  "Balance mild impairment;sitting, edge of bed     Sit to Stand Dynamic Balance moderate impairment;unsupported     Static Standing Balance moderate impairment;unsupported     Dynamic Standing Balance severe impairment;unsupported     Comment, Balance posterior lean in sitting        Impairments/Safety Issues    Impairments Affecting Function balance;postural/trunk control;strength;cognition;endurance/activity tolerance     Cognitive Impairments, Safety/Performance insight into deficits/self-awareness;awareness, need for assistance;problem-solving/reasoning                                              Education Documentation  Treatment Plan, taught by Rubina Jose PT at 4/3/2025  3:34 PM.  Learner: Patient  Readiness: Acceptance  Method: Explanation  Response: Verbalizes Understanding  Comment: Pt ed re: role of PT and mobility recs.        Session Outcome  Patient upright, in chair at end of session, call light in reach, chair alarm on, all needs met. Nursing notified about patient's position and patient's performance.    AM-PAC - Mobility (Current Function)     Turning form your back to your side while in flat bed without using bedrails 2 - A Lot   Moving from lying on your back to sitting on the side of a flat bed without using bedrails 2 - A Lot   Moving to and from a bed to a chair 2 - A Lot   Standing up from a chair using your arms 2 - A Lot   To walk in a hospital room 1 - Total   Climbing 3-5 steps with a railing 1 - Total   AM-PAC Mobility Score 10      ASSESSMENT AND PLAN     Progress Summary  Ptpresents with impaired cognition and functional mobility; he requires mod/max assist of 2 for bed mobility and transfes. This is reportedly his baseline. He has assistance at home from his son and HHA and all necessary DME. Recommend d/c to home with 24 hr care and assist of 2 for functional mobility.    Patient/Family Therapy Goals Statement: \"go home\"    PT Plan      Flowsheet Row Most Recent Value   Therapy " Frequency evaluation only at 04/03/2025 1122            PT Discharge Recommendations      Flowsheet Row Most Recent Value   PT Recommended Discharge Disposition home with assistance at 04/03/2025 1122   Anticipated Equipment Needs if Discharged Home (PT) none at 04/03/2025 1122

## 2025-04-03 NOTE — ASSESSMENT & PLAN NOTE
Normotensive on admission  Home medication: torsemide 20 mg daily    - HOLD torsemide iso infection

## 2025-04-03 NOTE — NURSING NOTE
Pt admitted to unit from ED.  Verified with monitor room.  Son (Marko), contacted to verify admission questions.  No complaints of pain at this time.  Call bell within reach.

## 2025-04-03 NOTE — PROGRESS NOTES
I called patient's son and provided a brief update on patient's clinical status. Answered all questions to the best of my ability.      Son stated they have elected for comfort feeds and do not want to pursue an additional VFSS during this admission. He has no diet restrictions at home. Eats pasta, oatmeal, cereal. Does not usually choke on food, but coughs a lot.    Son stated patient's garbled speech has been ongoing for several months, and noticed worsening over the past few days. Brother saw patient yesterday and they do not believe there was an acute change in his mental status or speech yesterday. He typically has worsened confusion or garbled speech in the mornings and nights when he is tired. He also has intermittent confusion with days of the week, but tends to have good long term memory.    He has a 24/7 caregiver at home as well.    Melissa Beltrán MD MPH  PGY1 Internal Medicine Resident  Pager: #[phone]

## 2025-04-03 NOTE — ASSESSMENT & PLAN NOTE
Follows with Dr. Jeffrey at [facility]  Causes recurrent left sided plural effusions requiring serial thoracentesis  TTE 09/2024 EF 40 to 45%, abnormal septal motion due to RV volume overload, unable to assess diastolic function. 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  CHF has been complicated by pleural effusions, saw Dr. Gregg outpatient no plan for PLeuRx     - HOLD torsemide iso infection  - Daily weights, strict I's/O  - s/p scheduled thoracentesis with 1L removal on 4/2. Ask IR about more fluid removal this admission

## 2025-04-03 NOTE — PLAN OF CARE
Problem: Adult Inpatient Plan of Care  Goal: Plan of Care Review  Flowsheets (Taken 4/3/2025 4198)  Progress: improving  Outcome Evaluation: PT Eval completed. Rec home with 24 hr care.  Plan of Care Reviewed With: patient     Problem: Mobility Impairment  Goal: Optimal Mobility  Outcome: Progressing

## 2025-04-03 NOTE — PLAN OF CARE
Problem: Adult Inpatient Plan of Care  Goal: Plan of Care Review  Outcome: Progressing  Flowsheets (Taken 4/3/2025 9191)  Progress: improving  Outcome Evaluation: OT IE coplete  Plan of Care Reviewed With: patient

## 2025-04-03 NOTE — ASSESSMENT & PLAN NOTE
- Debility likely multifactorial in the setting of advanced heart failure c/b recurrent pleural effusion, advanced age, chronic dysphagia  - Resides at home w 24 hour caregiver, Melinda -private pay. Pt's 3 sons local and participate in pt's care.  Baseline functional status: front wheeled walker. Dependent for all ADLS/IADLs.  Frailty:advanced  - Pt remains high risk for further deterioration, functional decline and death  -PPS: Current 40 % Baseline 40%      Plan:  - Continue supportive interventions to treat reversible causes of illness  - resume home palliative bridge and HbNP practice  -comfort feeds, family declining VFSS

## 2025-04-04 VITALS
DIASTOLIC BLOOD PRESSURE: 55 MMHG | WEIGHT: 172.5 LBS | SYSTOLIC BLOOD PRESSURE: 98 MMHG | HEART RATE: 71 BPM | OXYGEN SATURATION: 92 % | RESPIRATION RATE: 18 BRPM | HEIGHT: 73 IN | BODY MASS INDEX: 22.86 KG/M2 | TEMPERATURE: 97.3 F

## 2025-04-04 LAB
ANION GAP SERPL CALC-SCNC: 8 MEQ/L (ref 3–15)
BASOPHILS # BLD: 0.03 K/UL (ref 0.01–0.1)
BASOPHILS NFR BLD: 0.7 %
BUN SERPL-MCNC: 29 MG/DL (ref 7–25)
CALCIUM SERPL-MCNC: 7.9 MG/DL (ref 8.6–10.3)
CHLORIDE SERPL-SCNC: 109 MEQ/L (ref 98–107)
CO2 SERPL-SCNC: 22 MEQ/L (ref 21–31)
CREAT SERPL-MCNC: 1.5 MG/DL (ref 0.7–1.3)
DIFFERENTIAL METHOD BLD: ABNORMAL
EGFRCR SERPLBLD CKD-EPI 2021: 43.1 ML/MIN/1.73M*2
EOSINOPHIL # BLD: 0.08 K/UL (ref 0.04–0.54)
EOSINOPHIL NFR BLD: 1.9 %
ERYTHROCYTE [DISTWIDTH] IN BLOOD BY AUTOMATED COUNT: 19.7 % (ref 11.6–14.4)
GLUCOSE SERPL-MCNC: 93 MG/DL (ref 70–99)
HCT VFR BLD AUTO: 25.6 % (ref 40.1–51)
HGB BLD-MCNC: 8.2 G/DL (ref 13.7–17.5)
IMM GRANULOCYTES # BLD AUTO: 0.02 K/UL (ref 0–0.08)
IMM GRANULOCYTES NFR BLD AUTO: 0.5 %
LYMPHOCYTES # BLD: 0.58 K/UL (ref 1.2–3.5)
LYMPHOCYTES NFR BLD: 13.5 %
MAGNESIUM SERPL-MCNC: 1.9 MG/DL (ref 1.8–2.5)
MCH RBC QN AUTO: 32.5 PG (ref 28–33.2)
MCHC RBC AUTO-ENTMCNC: 32 G/DL (ref 32.2–36.5)
MCV RBC AUTO: 101.6 FL (ref 83–98)
MONOCYTES # BLD: 0.48 K/UL (ref 0.3–1)
MONOCYTES NFR BLD: 11.2 %
NEUTROPHILS # BLD: 3.1 K/UL (ref 1.7–7)
NEUTS SEG NFR BLD: 72.2 %
NRBC BLD-RTO: 0 %
PLATELET # BLD AUTO: 104 K/UL (ref 150–350)
PMV BLD AUTO: 10.4 FL (ref 9.4–12.4)
POTASSIUM SERPL-SCNC: 4.2 MEQ/L (ref 3.5–5.1)
RBC # BLD AUTO: 2.52 M/UL (ref 4.5–5.8)
SODIUM SERPL-SCNC: 139 MEQ/L (ref 136–145)
WBC # BLD AUTO: 4.29 K/UL (ref 3.8–10.5)

## 2025-04-04 PROCEDURE — 83735 ASSAY OF MAGNESIUM: CPT

## 2025-04-04 PROCEDURE — 63600000 HC DRUGS/DETAIL CODE

## 2025-04-04 PROCEDURE — 63700000 HC SELF-ADMINISTRABLE DRUG

## 2025-04-04 PROCEDURE — 80048 BASIC METABOLIC PNL TOTAL CA: CPT

## 2025-04-04 PROCEDURE — 99233 SBSQ HOSP IP/OBS HIGH 50: CPT | Performed by: NURSE PRACTITIONER

## 2025-04-04 PROCEDURE — 85025 COMPLETE CBC W/AUTO DIFF WBC: CPT

## 2025-04-04 PROCEDURE — 36415 COLL VENOUS BLD VENIPUNCTURE: CPT

## 2025-04-04 RX ADMIN — CARBOXYMETHYLCELLULOSE SODIUM 1 DROP: 5 SOLUTION/ DROPS OPHTHALMIC at 10:18

## 2025-04-04 RX ADMIN — TAMSULOSIN HYDROCHLORIDE 0.4 MG: 0.4 CAPSULE ORAL at 08:41

## 2025-04-04 RX ADMIN — CARBOXYMETHYLCELLULOSE SODIUM 1 DROP: 5 SOLUTION/ DROPS OPHTHALMIC at 08:41

## 2025-04-04 RX ADMIN — CARBOXYMETHYLCELLULOSE SODIUM 1 DROP: 5 SOLUTION/ DROPS OPHTHALMIC at 05:36

## 2025-04-04 RX ADMIN — CYANOCOBALAMIN TAB 500 MCG 500 MCG: 500 TAB at 08:41

## 2025-04-04 RX ADMIN — HEPARIN SODIUM 5000 UNITS: 5000 INJECTION, SOLUTION INTRAVENOUS; SUBCUTANEOUS at 05:36

## 2025-04-04 RX ADMIN — FERROUS SULFATE TAB 325 MG (65 MG ELEMENTAL FE) 325 MG: 325 (65 FE) TAB at 08:41

## 2025-04-04 RX ADMIN — METHENAMINE HIPPURATE 1 G: 1 TABLET ORAL at 12:21

## 2025-04-04 RX ADMIN — LEVOTHYROXINE SODIUM 100 MCG: 0.1 TABLET ORAL at 05:36

## 2025-04-04 RX ADMIN — HEPARIN SODIUM 5000 UNITS: 5000 INJECTION, SOLUTION INTRAVENOUS; SUBCUTANEOUS at 13:31

## 2025-04-04 RX ADMIN — THIAMINE HCL TAB 100 MG 100 MG: 100 TAB at 08:41

## 2025-04-04 RX ADMIN — CARBOXYMETHYLCELLULOSE SODIUM 1 DROP: 5 SOLUTION/ DROPS OPHTHALMIC at 12:21

## 2025-04-04 RX ADMIN — ASPIRIN 81 MG: 81 TABLET, COATED ORAL at 08:41

## 2025-04-04 RX ADMIN — ANTACID TABLETS 200 MG OF ELEMENTAL CALCIUM: 500 TABLET, CHEWABLE ORAL at 08:41

## 2025-04-04 RX ADMIN — CARBOXYMETHYLCELLULOSE SODIUM 1 DROP: 5 SOLUTION/ DROPS OPHTHALMIC at 13:31

## 2025-04-04 RX ADMIN — FINASTERIDE 5 MG: 5 TABLET, FILM COATED ORAL at 08:41

## 2025-04-04 ASSESSMENT — COGNITIVE AND FUNCTIONAL STATUS - GENERAL
STANDING UP FROM CHAIR USING ARMS: 2 - A LOT
MOVING TO AND FROM BED TO CHAIR: 2 - A LOT
CLIMB 3 TO 5 STEPS WITH RAILING: 1 - TOTAL
WALKING IN HOSPITAL ROOM: 2 - A LOT

## 2025-04-04 NOTE — ASSESSMENT & PLAN NOTE
-in the setting of chronic dysphagia/aspiration risk, recurrent pleural effusion iso CHF   -improving   -can consider pleur-x catheter   -morphine concentrated liquid 5 mg SL every 4 hours PRN severe dyspnea-warrants GOC discussion w family

## 2025-04-04 NOTE — PROGRESS NOTES
Internal Medicine  Daily Progress Note       Assessment & Plan  Dysarthria  Complicated UTI (urinary tract infection)  Hx of previous EBSL E. Coli + UTI resistant to multiple cephalosporins, bactrim and fluroquinolines in October 2024  Pt complaining of polyuria urinary frequency  UA with cloudy color, +3 LE, WBC TNTC, 1+ bacteria    No CVA tenderness on exam  S/p 500mg NS bolus and 1g ertapenem in the ED    - Ucx negative, ertapenem discontinued  - fu ID consult  - fu SLP consult: seen by SLP in 7/2024, failed VFSS, family w/ palliative elected for comfort feeds.  - Confirmed with family to continue comfort feeds during admission  - fu palliative care, had prior discussions of hospice in home palliative care visits  HFrEF (heart failure with reduced ejection fraction) (CMS/[facility])  Follow[facility]. Jeffrey at [facility]  Causes recurrent left sided plural effusions requiring serial thoracentesis  TTE 09/2024 EF 40 to 45%, abnormal septal motion due to RV volume overload, unable to assess diastolic function. 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  CHF has been complicated by pleural effusions, saw Dr. Gregg outpatient no plan for PLeuRx     - HOLD torsemide iso infection  - Daily weights, strict I's/O  -- s/p scheduled thoracentesis with 1L removal on 4/2  CAD (coronary artery disease)  History of CABG in 1996  No recent stress test or cath available on file  Previously on plavix and aspirin; Plavix had been discontinued over the last several months due to recurrent GI bleeds.    - Continue aspirin 81 mg daily  Hypertension  Normotensive on admission  Home medication: torsemide 20 mg daily    - HOLD torsemide iso infection  Hypothyroidism  TSH  6.26 on 05/03/2024.  Home medication: synthroid 100mcg   - Repeat TSH  - Continue home medication  Colostomy present on admission (CMS/[facility])  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   Extensive GIB hx also  Ostomy bag intact on examination   No blood noted in colostomy bag    - Continue to monitor    A-fib (CMS/[facility])  Hx of prior atrial fibrillation  Home regimen Not on AC due to history of GI bleed.  Follows with Dr. Roshan Jeffrey at [facility]  [id number] score: 5 points (age +2, CHF history +1, HTN history +1, prior NSTEMI +1)     - monitor on tele  -Heparin for DVT   Macrocytic anemia  Hb on presentation 9.3, Baseline Hb ~8-9  .4   B12, folate levels wnl    - monitor CBC  Palliative care by specialist    Debility    Shortness of breath      SUBJECTIVE   This is a 89 y.o. year-old male admitted on 4/2/2025 with Word finding difficulty [R47.89]  Acute encephalopathy [G93.40]  Complicated UTI (urinary tract infection) [N39.0]  CKD stage 3a, GFR 45-59 ml/min (CMS/[facility]) [N18.31].    Interval History: No acute events overnight. Patient with more garbled speech this morning. This is consistent with history provided by son, in which patient usually has more garbled speech during early mornings and late evenings.     OBJECTIVE   Vital signs in last 24 hours:  Temp:  [36.3 °C (97.4 °F)-37.2 °C (98.9 °F)] 36.3 °C (97.4 °F)  Heart Rate:  [60-70] 63  Resp:  [16-18] 18  BP: (111-116)/(57-73) 116/63  SpO2:  [95 %-98 %] 98 %  Oxygen Therapy: None (Room air)    Weight & I/Os:  Weights (last 5 days)       Date/Time Weight    04/02/25 10:47:20 78.2 kg (172 lb 8 oz)    04/02/25 1043 78.2 kg (172 lb 8 oz)          No intake or output data in the 24 hours ending 04/04/25 0659     PHYSICAL EXAMINATION   Physical Exam  Constitutional:       General: He is awake.      Appearance: He is ill-appearing.   Cardiovascular:      Rate and Rhythm: Normal rate and regular rhythm.    Pulmonary:      Effort: Pulmonary effort is normal.      Breath sounds: Normal breath sounds.   Abdominal:      General: Abdomen is flat. Bowel sounds are normal.      Palpations: Abdomen is soft.   Skin:     General: Skin is warm and dry.   Neurological:      Mental Status: He is alert.      Cranial Nerves: Dysarthria present.      Comments: Garbled speech. Difficult to assess orientation.   Psychiatric:         Behavior: Behavior is cooperative.          LINES, CATHETERS, DRAINS, AIRWAYS, & WOUNDS   Lines, drains, airways, & wounds:  Peripheral IV (Adult) 04/02/25 Anterior;Proximal;Right Forearm (Active)   Number of days: 2       Peripheral IV (Adult) 04/02/25 Left Antecubital (Active)   Number of days: 2        LABS, IMAGING, & TELE   Labs:  I have reviewed all pertinent labs.    Results from last 7 days   Lab Units 04/04/25  0636 04/03/25  0332 04/02/25  1006   WBC K/uL 4.29 5.54 5.25   HEMOGLOBIN g/dL 8.2* 9.8* 9.3*   HEMATOCRIT % 25.6* 31.0* 29.2*   PLATELETS K/uL 104* 125* 111*       Results from last 7 days   Lab Units 04/03/25  0332 04/02/25  1006   SODIUM mEQ/L 138 138   POTASSIUM mEQ/L 4.2 4.3   CHLORIDE mEQ/L 105 106   CO2 mEQ/L 21 25   BUN mg/dL 29* 30*   CREATININE mg/dL 1.5* 1.6*   CALCIUM mg/dL 8.2* 8.2*   ALBUMIN g/dL  --  2.8*   BILIRUBIN TOTAL mg/dL  --  0.8   ALK PHOS IU/L  --  94   ALT IU/L  --  6*   AST IU/L  --  29   GLUCOSE mg/dL 89 110*       Imaging personally reviewed (does not include unread studies):  ULTRASOUND KIDNEYS / BLADDER  Result Date: 4/3/2025  CLINICAL HISTORY: hx of bladder outlet obstruction COMPARISON: CT dated 2/16/2025, 2/6/2025, and studies TECHNIQUE: Ultrasound of the retroperitoneum was performed. COMMENT: Bilateral ureteral jets are identified. The urinary bladder wall is diffusely irregular.  There is layering echogenic debris within the urinary bladder lumen. Multiple bladder diverticula noted. Prevoid bladder volume: 293.8 ml Postvoid residual (PVR) bladder  volume: Not assessed. The prostate is enlarged, with evidence of benign prostatic hypertrophy, measuring 5.6 x 4.2 x 4.5 cm. The right kidney measures 9.8 cm x 5.0 cm x 4.5 cm. The left kidney measures 11.1 cm x 5.7 cm x 4.3 cm. There is increased cortical echogenicity in both kidneys.  There are multiple bilateral renal cysts. Nonobstructing 7 mm stone in the left lower pole.  No shadowing calculi on the right. Incidentally noted ascites in the right upper quadrant.     IMPRESSION: 1.  Findings of medical renal disease. 2.  Nonobstructing left renal stone.  No hydronephrosis. 3.  Marked benign prostatic hypertrophy. 4.  Diffuse urinary bladder wall irregularity and multiple bladder diverticula, consistent with sequela of chronic bladder obstruction 5.  Incidentally noted right upper quadrant ascites.       Telemetry/EKG:  I have reviewed all pertinent telemetry/EKG.     VTE Assessment: Padua    VTE Prophylaxis: Current anticoagulants:  heparin (porcine) 5,000 unit/mL injection 5,000 Units, subcutaneous, q8h IVÁN      Code Status: DNR (A.N.D.)  Estimated discharge date: 4/6/2025     ATTENDING DOCUMENTATION  ALSO SEE ATTENDING ATTESTATION SECTION OF NOTE

## 2025-04-04 NOTE — PLAN OF CARE
Problem: Adult Inpatient Plan of Care  Goal: Plan of Care Review  Outcome: Progressing  Flowsheets (Taken 4/4/2025 1237)  Progress: no change  Outcome Evaluation: pt aaox2, vss, no complaints, turned q2hrs, bed low and alarmed, call bell within reach, meds crushed in apple sauce, colostomy emptied, pt stable for discharge this afternoon  Plan of Care Reviewed With: patient     Problem: Adult Inpatient Plan of Care  Goal: Plan of Care Review  Outcome: Progressing  Flowsheets (Taken 4/4/2025 1237)  Progress: no change  Outcome Evaluation: pt aaox2, vss, no complaints, turned q2hrs, bed low and alarmed, call bell within reach, meds crushed in apple sauce, colostomy emptied, pt stable for discharge this afternoon  Plan of Care Reviewed With: patient   Plan of Care Review  Plan of Care Reviewed With: patient  Progress: no change  Outcome Evaluation: pt aaox2, vss, no complaints, turned q2hrs, bed low and alarmed, call bell within reach, meds crushed in apple sauce, colostomy emptied, pt stable for discharge this afternoon

## 2025-04-04 NOTE — PROGRESS NOTES
[facility] liaison following. Patient on service with [facility] therapy team prior to admission. Spoke with son, Marko, who confirmed that they are interested in the palliative bridge and continues service from the [facility] palliative NP program. Referred patient to [facility] p bridge for RN, PT, MSW and aide with anticipated discharge 04 04 2025. Thank you

## 2025-04-04 NOTE — NURSING NOTE
Patient discharge ordered, IV and tele removed, belongings returned, colostomy bag changed and emptied, patient left with son in personal vehicle

## 2025-04-04 NOTE — PLAN OF CARE
Care Coordination Discharge Plan Summary    Admission Assessment Summary    General Information  Readmission Within the last 30 days: previous discharge plan unsuccessful  Does patient have a : No  Patient-Specific Goals (include timeframe): home    Living Arrangements  Arrived From: home  Current Living Arrangements: home  People in Home: other (see comments)  Home Accessibility:    Living Arrangement Comments: Pt lives at home and has a 24 hour home care giver with [employer].  Pt's family pays privately for services.    Social Drivers of Health - Screenings  Housing Stability  In the last 12 months, was there a time when you were not able to pay the mortgage or rent on time?: No  In the past 12 months, how many times have you moved where you were living?: 0  At any time in the past 12 months, were you homeless or living in a shelter (including now)?: No  Utility Access  In the past 12 months has the electric, gas, oil, or water company threatened to shut off services in your home?: Yes  Transportation Needs  In the past 12 months, has lack of transportation kept you from medical appointments or from getting medications?: No  In the past 12 months, has lack of transportation kept you from meetings, work, or from getting things needed for daily living?: No    Functional Status Prior to Admission  Assistive Device/Animal Currently Used at Home: hospital bed, walker, front-wheeled, ramps, wheelchair, scale  Functional Status Comments: Pt is completely dependent of all ADL and IADL support.  IADL Comments: Pt is completely dependent of all ADL and IADL support.    Discharge Needs Assessment    Concerns to be Addressed: care coordination/care conferences, discharge planning  Current Discharge Risk: none  Anticipated Changes Related to Illness: none    Discharge Plan Summary    Patient Choice  Offered/Gave Vendor List: yes  Patient and/or patient guardian/advocate was made aware of their right to  choose a provider. A list of eligible providers was presented and reviewed with the patient and/or patient guardian/advocate in written and/or verbal form. The list delineates providers in the patients desired geographic area who are participating in the Medicare program and/or providers contracted with the patients primary insurance. The Medicare list and quality ratings were obtained from the Medicare.gov [medicare.gov] website.    Concerns / Comments: Per rounds patient stable for discharge today. CM spoke to patient's son regarding dc plan. [facility] p bridge for RN, PT, MSW to follow, provided update to liaison. Patient to dc to home address on file. IMM verbally reviewed. Family will provide transportation at dc and in agreement with dc.     Discharge Plan:  Disposition/Destination: Home Health Care - [facility] / Home       Connection to Community  Not applicable  Community Resources:      Discharge Transportation:  Is Out of Hospital DNR needed at Discharge: yes  Does patient need discharge transport?           PACU

## 2025-04-04 NOTE — TREATMENT PLAN
Patient's urine culture returned negative.  Antibiotic was discontinued.    Ultrasound of the bladder and kidneys with significant abnormalities that appear chronic, and we recommend very close follow-up with the urologist for further evaluation and management.    Patient's family have elected for comfort feeds, they do not want to pursue any additional VFSS.  Patient will remain at risk for aspiration.    Son stated the patient's garbled speech has been ongoing for several months.    Recommend following closely the results of the blood cultures until final.  If any positive cultures or evidence of active infection please re-consult infectious diseases.        Please call ID if any questions or concerns, if any new culture data that needs immediate attention and if any clinical deterioration.          NOTE: Some or all of the note above was created with the use of dictation software, please note this dictation software can have anomalies in its ability to transcribe. Please contact me directly for anything that seems abnormal for clarification.

## 2025-04-04 NOTE — ASSESSMENT & PLAN NOTE
Hx of prior atrial fibrillation  Home regimen Not on AC due to history of GI bleed.  Follows with Dr. Roshan Jeffrey at [facility]  [id number] score: 5 points (age +2, CHF history +1, HTN history +1, prior NSTEMI +1)     - monitor on tele  -Heparin for DVT

## 2025-04-04 NOTE — PLAN OF CARE
Plan of Care Review  Plan of Care Reviewed With: patient  Progress: no change  Outcome Evaluation: Pt AAOx2, no complaints of pain or discomfort.  q2 turns per order.  No needs at this time.  Bed low and alarmed.  Call bell within reach.

## 2025-04-04 NOTE — ASSESSMENT & PLAN NOTE
Hx of previous EBSL E. Coli + UTI resistant to multiple cephalosporins, bactrim and fluroquinolines in October 2024  Pt complaining of polyuria urinary frequency  UA with cloudy color, +3 LE, WBC TNTC, 1+ bacteria    No CVA tenderness on exam  S/p 500mg NS bolus and 1g ertapenem in the ED    - Ucx negative, ertapenem discontinued  - fu ID consult  - fu SLP consult: seen by SLP in 7/2024, failed VFSS, family w/ palliative elected for comfort feeds.  - Confirmed with family to continue comfort feeds during admission  - fu palliative care, had prior discussions of hospice in home palliative care visits

## 2025-04-04 NOTE — PROGRESS NOTES
Spoke to patient's son and gave a clinical update on the patient. I answered all of their questions and they were appreciative of the conversation.     Eric Anton, DO   PGY-2  Pager #[phone]

## 2025-04-04 NOTE — ASSESSMENT & PLAN NOTE
Follows with Dr. Jeffrey at [facility]  Causes recurrent left sided plural effusions requiring serial thoracentesis  TTE 09/2024 EF 40 to 45%, abnormal septal motion due to RV volume overload, unable to assess diastolic function. 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  CHF has been complicated by pleural effusions, saw Dr. Gregg outpatient no plan for PLeuRx     - HOLD torsemide iso infection  - Daily weights, strict I's/O  -- s/p scheduled thoracentesis with 1L removal on 4/2

## 2025-04-04 NOTE — PROGRESS NOTES
Patient Name: Samy Elena  Patient MRN: [id number]  Date / Time: 4/4/2025 3:26 PM   Attending Physician: Gemma Mckeon MD    This is Hospital Day: 3        Assessment & Plan  Palliative care by specialist  89 y.o. male with a PMH of CAD s/p CABG, HFrEF (40-45%), torrential TR, recurrent left sided pleural effusion, CKD who presents from IR with slurred speech following thoracentesis. He is admitted with encephalopathy and UTI.    - Code status: DNR/DNI  - Prognosis:  meets hospice criteria  - Capacity for medical decision making: unreliable  - Advance Directives: not on file in EMR  - Goals of care: Goals are comfort  and QOL based with comfort feeds with priority of returning home   - Surrogate Decision Maker: pt designates his son Marko Burgess  - Debility likely multifactorial in the setting of advanced heart failure c/b recurrent pleural effusion, advanced age, chronic dysphagia  - Resides at home w 24 hour caregiver, Melinda -private pay. Pt's 3 sons local and participate in pt's care.  Baseline functional status: front wheeled walker. Dependent for all ADLS/IADLs.  Frailty:advanced  - Pt remains high risk for further deterioration, functional decline and death  -PPS: Current 40 % Baseline 40%      Plan:  - Continue supportive interventions to treat reversible causes of illness  - resume home palliative bridge and HbNP practice  -comfort feeds, family declining VFSS    Shortness of breath   -in the setting of chronic dysphagia/aspiration risk, recurrent pleural effusion iso CHF   -improving   -can consider pleur-x catheter   -morphine concentrated liquid 5 mg SL every 4 hours PRN severe dyspnea-warrants GOC discussion w family    Interval History (Subjective)    Source: chart review and the patient    Covisit w/ [facility] LSW    Patient alert, his speech has improved since yesterday. Patient is pleasant, in good spirits. He is looking forward to going home today. He denies pain, SOB,  nausea, abdominal pain        Current Medications:  Current Facility-Administered Medications   Medication Dose Route Frequency Provider Last Rate Last Admin    aspirin enteric coated tablet 81 mg  81 mg oral Daily Patrick Boggs DO   81 mg at 04/04/25 0841    calcium (as carbonate) (TUMS) chewable tablet 200 mg of elemental calcium  200 mg of elemental calcium oral Daily Patrick Boggs DO   200 mg of elemental calcium at 04/04/25 0841    carboxymethylcellulose (REFRESH PLUS) 0.5 % ophthalmic dropperette 1 drop  1 drop Both Eyes q2h while awake Patrick Boggs DO   1 drop at 04/04/25 1331    cyanocobalamin (VITAMIN B12) tablet 500 mcg  500 mcg oral q AM Patrick Boggs DO   500 mcg at 04/04/25 0841    glucose chewable tablet 16-32 g of dextrose  16-32 g of dextrose oral PRN Patrick Boggs DO        Or    dextrose 40 % oral gel 15-30 g of dextrose  15-30 g of dextrose oral PRN Patrick Boggs DO        Or    glucagon (GLUCAGEN) injection 1 mg  1 mg intramuscular PRN Patrick Boggs DO        Or    dextrose 50 % in water (D50) injection 12.5 g  25 mL intravenous PRN Patrick Boggs DO        ferrous sulfate tablet 325 mg  325 mg oral Daily with breakfast Patrick Boggs DO   325 mg at 04/04/25 0841    finasteride (PROSCAR) tablet 5 mg  5 mg oral Daily Patrick Boggs DO   5 mg at 04/04/25 0841    heparin (porcine) 5,000 unit/mL injection 5,000 Units  5,000 Units subcutaneous q8h [facility] Patrick Boggs DO   5,000 Units at 04/04/25 1331    latanoprost (XALATAN) 0.005 % ophthalmic solution 1 drop  1 drop Both Eyes Nightly Patrick Boggs DO   1 drop at 04/03/25 2127    levothyroxine (SYNTHROID) tablet 100 mcg  100 mcg oral Daily before breakfast Patrick Boggs DO   100 mcg at 04/04/25 0536    melatonin capsule 10 mg  10 mg oral Nightly Patrick Boggs DO   10 mg at 04/03/25 1948    methenamine (HIPREX) tablet 1 g  1 g oral Daily before lunch [place]  Eric Zhu,    1 g at 04/04/25 1221    metoprolol succinate ER (TOPROL-XL) pre-halved 24 hr ER tablet 12.5 mg  12.5 mg oral Daily Eric Anton DO        tamsulosin (FLOMAX) 24 hr ER capsule 0.4 mg  0.4 mg oral q AM Patrick Boggs DO   0.4 mg at 04/04/25 0841    thiamine (VITAMIN B1) tablet 100 mg  100 mg oral q AM Patrick Boggs DO   100 mg at 04/04/25 0841    [Provider Managed Hold] torsemide (DEMADEX) tablet 20 mg  20 mg oral BID Patrick Boggs DO   20 mg at 04/02/25 2313       Objective    Physical Exam:    General:   No Acute Distress, frail appearing   Eyes:  ancteric sclera  ENMT:  Mucus Membranes Moist   CV:  Radial Pulses intact  Lungs:  Respirations unlabored  Abdomen: ND  Musculosketetal: No deformity  Psych:  pleasant, conversive  Neuro:  Alert, speech more clear, follows commands, initiates conversation  Skin:  Rash absent      Vitals:  Vitals:    04/04/25 1045   BP: (!) 98/55   Pulse: 71   Resp: 18   Temp: 36.3 °C (97.3 °F)   SpO2: 92%       I have reviewed the patient's pertinent labs to the time of note.  Significant abnormals are :   Hgb  8.2- anemia  Platelets 104- thormbocytopenia  Creat   1.5 -CALI/CKD  Albumin  2.8- hypoalbuminemia    I have independently reviewed the pertinent imaging to the time of note and agree with reported results.    I have independently reviewed the pertinent cardiac studies to the time of note and agree with reported results.          HEIDI Baxter  Office [phone]

## 2025-04-04 NOTE — PROGRESS NOTES
Palliative Care Social Work  Follow-up Note    Reason for Consult:   Palliative Care  is following for assistance with clarification of goals of care    Source: chart review and the patient    Interventions:   Psychological: Provided supportive counseling    Summary of Visit: known to this writer from previous hospitalizations, visited patient today alongside HEIDI Pablo reports feeling much better today and eager to get home, then openly engaged in life review, denies discomfort, and reports his speech is much improved.     Next Steps/Follow-Up Plan:  Patient set for d/c today.     Total Time Spent: 15-30 minutes including chart review  SHIELA Chan

## 2025-04-05 NOTE — ED PROVIDER NOTES
Emergency Medicine Note  HPI   HISTORY OF PRESENT ILLNESS     89-year-old male with a past medical history atrial fibrillation and recurrent pleural effusions, presents to the Emergency Department following a thoracentesis procedure.  Stroke alert was called from interventional radiology secondary to the patient's slurred speech.  After speaking to his son, the symptoms started a few days couple of days ago.    The patient reports that the speech issues started a couple of days prior to the ED visit but have significantly improved by the time of examination. The patient denies any current confusion. Shortness of breath has been present for the past couple of days. The patient mentions that the thoracentesis procedure typically causes fatigue but usually improves cognition and breathing.     The patient's blood pressure is noted to be lower than usual. The patient takes a daily baby aspirin but is not on stronger blood thinners due to a history of GI bleeds. The patient reports frequent urinary tract infections.     The patient states that when pleural fluid builds up, it results in decreased energy levels.  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)     Coronary artery disease     Disease of thyroid gland     Will catheter in place     6/25 not at present    GI (gastrointestinal bleed)     Hypertension     Myocardial infarction (CMS/HCC)     Orthostatic hypotension     TIA (transient ischemic attack)        Past Surgical History   Procedure Laterality Date    cataract extraction right eye  with implant and limbal relaxing incision Right 7/18/2024    Performed by Hayder Moses MD at  OR [facility]    Cataract extraction w/ intraocular lens implant Left     Cataract extraction with LRI and anterior vitrectomy left eye Left 11/16/2023    Performed by Hayder Moses MD at  OR [facility]    Cholecystectomy      Colostomy      COLOSTOMY LAPAROSCOPIC N/A 8/3/2022    Performed by Mat Bryant MD at [facility] OR    Coronary artery bypass graft      DIAGNOSTIC FLEXIBLE SIGMOIDOSCOPY WITH COLLECTION OF SPECIMEN BY WASHING N/A 11/27/2024    Performed by David Holcomb MD at [facility] GI    DIAGNOSTIC FLEXIBLE SIGMOIDOSCOPY WITH COLLECTION OF SPECIMEN BY WASHING N/A 10/18/2024    Performed by Maine Lopez MD at [facility] GI    DIAGNOSTIC UPPER GASTROINTESTINAL ENDOSCOPY WITH COLLECTION OF SPECIMEN BY WASHING N/A 11/27/2024    Performed by David Holcomb MD at [facility] GI    FLEXIBLE SIGMOIDOSCOPY WITH BIOPSY N/A 10/23/2024    Performed by David Holcomb MD at [facility] GI    INCISION AND DRAINAGE WITH DEBRIDMENT OF BUTTOCK, AND PERINEUM N/A 8/2/2022    Performed by Mat Bryant MD at [facility] OR    Joint replacement Left     Knee    RIGHT HEART CATH N/A 8/4/2022    Performed by Cristal Lacey MD at [facility] CARDIAC CATH/EP    Thyroidectomy      WASHOUT OF PERINEAL WOUND, COLONIC LAVAGE N/A 8/3/2022    Performed by Mat Bryant MD at [facility] OR       No family history on file.    Social History     Tobacco Use    Smoking status: Never    Smokeless tobacco: Never   Vaping Use    Vaping status: Never Used   Substance Use Topics    Alcohol use: Not Currently     Comment: social    Drug use: Never         Review of Systems   REVIEW OF SYSTEMS     Review of Systems      VITALS     ED Vitals      Date/Time Temp Pulse Resp BP SpO2 [facility]   04/02/25 1900 -- 55 17 113/60 96 % [facility]   04/02/25 1748 -- 66 37 114/62 97 % [facility]   04/02/25 1745 36.4 °C (97.6 °F) 64 16 -- 97 % [facility]   04/02/25 1722 36.3 °C (97.4 °F) -- -- -- -- [facility]   04/02/25  1700 -- 62 24 112/78 94 % [facility]   04/02/25 1649 -- 61 20 114/79 96 % [facility]   04/02/25 1645 36.5 °C (97.7 °F) 67 20 114/79 97 % [facility]   04/02/25 1545 -- 53 18 -- 96 % [facility]   04/02/25 1330 -- 52 16 110/56 -- [facility]   04/02/25 1330 -- -- -- -- 94 % [facility]   04/02/25 1300 -- 59 20 100/57 94 % [facility]   04/02/25 1245 -- 54 18 101/50 97 % [facility]   04/02/25 1216 -- 59 -- 105/64 94 % [facility]   04/02/25 1216 -- -- 19 -- -- [facility]   04/02/25 1215 -- -- 19 -- -- [facility]   04/02/25 1200 -- 55 -- 100/50 -- [facility]   04/02/25 1145 -- 58 12 96/54 94 % [facility]   04/02/25 1130 -- 63 13 106/58 97 % [facility]   04/02/25 1100 -- 56 20 99/53 97 % [facility]   04/02/25 1046 -- 57 19 115/58 97 % [facility]   04/02/25 1045 -- 65 20 105/55 98 % [facility]   04/02/25 1043 36.4 °C (97.6 °F) -- 20 105/55 98 % [facility]          Pulse Ox %: 98 % (04/02/25 1300)  Pulse Ox Interpretation: Normal (04/02/25 1300)  Heart Rate: 75 (04/02/25 1300)  Rhythm Strip Interpretation: Normal Sinus Rhythm (04/02/25 1300)     Physical Exam   PHYSICAL EXAM     Physical Exam  Vitals and nursing note reviewed.   Constitutional:       Appearance: He is well-developed.      Comments:  elderly male, hard of hearing   HENT:      Head: Normocephalic and atraumatic.   Eyes:      Conjunctiva/sclera: Conjunctivae normal.   Cardiovascular:      Rate and Rhythm: Normal rate and regular rhythm.   Pulmonary:      Effort: Pulmonary effort is normal.      Breath sounds: Normal breath sounds.      Comments: Decreased breath sounds on the left  Abdominal:      General: There is no distension.      Palpations: Abdomen is soft. There is no mass.      Tenderness: There is no abdominal tenderness.   Musculoskeletal:         General: No tenderness or deformity. Normal range of motion.      Cervical back: Normal range of motion.   Skin:     General: Skin is warm and dry.   Neurological:      Mental Status: He is alert. Mental status is at baseline.      Comments: Muscle strength 5 out of 5, normal sensory.  Cranial nerves II through XII are intact.  At  times, has garbled speech   Psychiatric:         Behavior: Behavior normal.           PROCEDURES     Procedures     DATA     Results       Procedure Component Value Units Date/Time    Blood Culture Blood, Venous [[id number]]  (Normal) Collected: 04/02/25 1834    Specimen: Blood, Venous Updated: 04/04/25 2001     Culture No growth at 48 hours    Blood Culture Blood, Venous [[id number]]  (Normal) Collected: 04/02/25 1834    Specimen: Blood, Venous Updated: 04/04/25 2001     Culture No growth at 48 hours    Lactic acid, Venous [[id number]]  (Normal) Collected: 04/02/25 1834    Specimen: Blood, Venous Updated: 04/02/25 1847     Lactate 1.8 mmol/L     UA w/ reflex culture (ED Only) [[id number]]  (Abnormal) Collected: 04/02/25 1440    Specimen: Urine, Clean Catch Updated: 04/02/25 1507    Narrative:      The following orders were created for panel order UA w/ reflex culture (ED Only).  Procedure                               Abnormality         Status                     ---------                               -----------         ------                     UA Reflex to Culture (Ma...[[id number]]  Abnormal            Final result               UA Microscopic[[id number]]               Abnormal            Final result                 Please view results for these tests on the individual orders.    UA Microscopic [[id number]]  (Abnormal) Collected: 04/02/25 1440    Specimen: Urine, Clean Catch Updated: 04/02/25 1507     RBC, Urine 0 TO 4 /HPF      WBC, Urine Too Numerous To Count /HPF      Squamous Epithelial None Seen /hpf      Hyaline Cast 0 TO 2 /lpf      Bacteria, Urine +1 /HPF      Mucus Rare /LPF      Yeast, Urine Rare /HPF     UA Reflex to Culture (Macroscopic) [[id number]]  (Abnormal) Collected: 04/02/25 1440    Specimen: Urine, Clean Catch Updated: 04/02/25 1501     Color, Urine Yellow     Clarity, Urine Cloudy     Specific Gravity, Urine 1.023     pH, Urine 6.5     Leukocyte Esterase +3     Nitrite, Urine Negative      Protein, Urine +1     Glucose, Urine Negative mg/dL      Ketones, Urine Negative mg/dL      Urobilinogen, Urine 0.2 EU/dL      Bilirubin, Urine Negative mg/dL      Blood, Urine Trace     Comment: The sensitivity of the occult blood test is equivalent to approximately 4 intact RBC/HPF.               Imaging Results              CT CHEST WITHOUT IV CONTRAST (Final result)  Result time 04/02/25 15:14:01      Final result                   Impression:    IMPRESSION:  Bilateral pleural effusions both of which are smaller as compared to CT exam of  February 2025.    Bilateral lower lobe consolidative opacities similar to somewhat improved since  prior exam.  Atelectasis versus pneumonia.    There are stable compression fractures of the T5 and L1 vertebral bodies.    Stable cardiomegaly.               Narrative:    CLINICAL HISTORY: Worsening shortness of breath, 89-year-old male    COMMENT: Unenhanced CT of the chest is performed.    CT DOSE:  One or more dose reduction techniques (e.g. automated exposure  control, adjustment of the mA and/or kV according to patient size, use of  iterative reconstruction technique) utilized for this examination    Intravenous contrast was not administered..    Lack of intravenous contrast limits assessment of the solid organs, vasculature  and certain other processes.    COMPARISON STUDY: Chest CT dated 2/16/2025.    FINDINGS:    Chest wall and pleura: There are relatively small bilateral pleural effusions  left slightly larger than right both decreased in size since previous CT exam.    The lung fields: Bilateral posterior lower lobe consolidative opacities  atelectasis versus pneumonia.  Biapical pleural-parenchymal scarring.    Heart and pericardium: The heart is enlarged.  No pericardial effusion.  There  are significant coronary arterial calcifications.  Changes of probable coronary  artery bypass graft surgery noted.    Chest Wall: Changes of median sternotomy.    Mediastinum and  bismark: No gross lymphadenopathy appreciated given limitations of  unenhanced imaging.    Axilla: No axillary adenopathy.    Osseous structures: Stable compression fractures of T5 and L1.                                      ECG 12 lead          Scoring tools                                  ED Course & MDM   MDM / ED COURSE / CLINICAL IMPRESSION / DISPO     Medical Decision Making  Problems Addressed:  Acute encephalopathy: complicated acute illness or injury  Complicated UTI (urinary tract infection): complicated acute illness or injury  Word finding difficulty: complicated acute illness or injury    Amount and/or Complexity of Data Reviewed  Independent Historian: caregiver     Details: Patient son at bedside  Labs: ordered. Decision-making details documented in ED Course.  Radiology: ordered and independent interpretation performed. Decision-making details documented in ED Course.     Details: Independently evaluated and agree w/ radiology    Discussion of management or test interpretation with external provider(s): Spoke with neurology    Risk  Decision regarding hospitalization.        ED Course as of 04/05/25 0739   Sat Apr 05, 2025   0736 UA w/ reflex culture (ED Only)(!)  Consistent with UTI [RL]   0736 Patient's other blood work is under his previous chart when he was at IR.    Patient presents today as a stroke alert from interventional radiology.  Has had garbled speech the symptoms have actually been going on for 2 to 3 days which is typical leading up to his thoracentesis.     [RL]      ED Course User Index  [RL] Susanna Morgan DO     Clinical Impression      Word finding difficulty   Complicated UTI (urinary tract infection)   Acute encephalopathy     _________________       ED Disposition   Admit / Observation                       Susanna Morgan DO  04/05/25 0765

## 2025-04-06 LAB
BACTERIA BLD CULT: NORMAL
BACTERIA BLD CULT: NORMAL

## 2025-04-06 ASSESSMENT — ENCOUNTER SYMPTOMS
SPEECH DIFFICULTY: 1
HEMATURIA: 0
CHEST TIGHTNESS: 0
ABDOMINAL DISTENTION: 0
BLOOD IN STOOL: 0
NAUSEA: 0
HALLUCINATIONS: 1
COUGH: 0
SHORTNESS OF BREATH: 0
TROUBLE SWALLOWING: 0
NERVOUS/ANXIOUS: 0
SLEEP DISTURBANCE: 0
MYALGIAS: 1
VOMITING: 0
FATIGUE: 1
ANAL BLEEDING: 0
CONFUSION: 1
DYSURIA: 0
WEAKNESS: 1

## 2025-04-06 NOTE — ASSESSMENT & PLAN NOTE
Code Status: DNR/DNI  Patient does not have capacity to make medication decisions due to increasing confusion and hallucinations  POA: Son, Marko Elena. Will provide documentation at future visit  Advanced Directives: POLST and POA in EHR  Treatment decisions/Goals of Care: Agreeable to return to the hospital if comfort needs cannot be met at home, accepts antibiotics for comfort, declines artificial hydration and nutrition  Family Support: Patient resides at home with live-in caregiver. Three local sons are active in patient's care. One additional son also involved but resides in [place], [place]  Spiritual Support: Anglican. [facility]  Next Palliative Follow Up Visit: 4/9/25 update: Spoke with son, Marko, who reports family would like patient to follow up with a few specialist visits over the next two weeks and will then likely elect hospice. Palliative follow up visit scheduled in two weeks

## 2025-04-06 NOTE — ASSESSMENT & PLAN NOTE
Follows with Cardiology  Worsening  EF 40-45%  Need for recurrent thoracenteses in the setting of left pleural effusions  Monitor for restlessness and new onset of shortness of breath

## 2025-04-06 NOTE — PROGRESS NOTES
Home Based Palliative Medicine Established Visit    Patient ID: Samy Elena is a 89 y.o. male  MRN: [id number]  Date of Visit: 4/9/2025  Referring Physician: No referring provider defined for this encounter.  Primary Care Physician: Zachery Hernandez MD  Reason for Visit: Palliative Care Follow-up    History of Present Illness  Source: Patient, Caregiver, Medical Records, and Son   89-year-old male received seated in wheelchair in living room accompanied by his HHA, Melinda, and his sons, Marko and Watson. Patient beginning to suffer adverse effects from thoracenteses as was recently hospitalized for suspected stroke following procedure. Sons and caregiver report increased episodes of confusion, visual hallucinations, and agitation throughout the day. Appetite remains fair. Denies dysphagia. Regular bowel movements via ileostomy.     Assessment/Plan   Diagnoses and all orders for this visit:    Palliative care by specialist (Primary)  Assessment & Plan:  Code Status: DNR/DNI  Patient does not have capacity to make medication decisions due to increasing confusion and hallucinations  POA: SonMarko. Will provide documentation at future visit  Advanced Directives: POLST and POA in EHR  Treatment decisions/Goals of Care: Agreeable to return to the hospital if comfort needs cannot be met at home, accepts antibiotics for comfort, declines artificial hydration and nutrition  Family Support: Patient resides at home with live-in caregiver. Three local sons are active in patient's care. One additional son also involved but resides in [place], [place]  Spiritual Support: Baptist. Receives weekly Eucharistic  visits from local [facility]  Next Palliative Follow Up Visit: 4/9/25 update: Spoke with son, Marko, who reports family would like patient to follow up with a few specialist visits over the next two weeks and will then likely elect hospice. Palliative follow up visit scheduled in two weeks      HFrEF (heart  failure with reduced ejection fraction) (CMS/Formerly [facility])  Assessment & Plan:  Follows with Cardiology  Worsening  EF 40-45%  Need for recurrent thoracenteses in the setting of left pleural effusions  Monitor for restlessness and new onset of shortness of breath      Chronic atrial fibrillation (CMS/Formerly [facility])  Assessment & Plan:  Stable  Maintained on aspirin due to history of gi bleed  Monitor for PE, DVTs      Anxiety  Assessment & Plan:  Experiencing episodes of confusion and anxiety  4/8/25 Start clonazepam. 4/9/25 Spoke with son, Marko, who confirmed patient received medication hs and slept much better overnight. There were no episodes of overnight anxiety or confusion. Palliative to continue to closely follow patient  Consider 02 and morphine concentrate for symptom management at next visit      History of recurrent UTIs  Assessment & Plan:  Denies urinary symptoms  Will without sediment, no evidence of infection  Monitor Will output. Encourage fluids as tolerated      Ileostomy in place (CMS/[facility]ly [facility])  Assessment & Plan:  Patent, draining liquid brown stool  No skin breakdown around ostomy        Debility  Assessment & Plan:  Debility likely multifactorial in the setting of advanced cardiac disease and hallucinations  Frail, generalized sarcopenia  Albumin: 2.6  PPS: 40%.   ADLs: Non-weight bearing. Requires the assistance of one to two for transfers and bed mobility. Full assistance of one for bathing, toileting, and dressing. Uses wheelchair for locomotion  Maintain fall risk precautions  Patient remains high risk for further deterioration and functional decline      Other orders  -     clonazePAM (KlonoPIN) 0.5 mg tablet; Take 0.5 tablets (0.25 mg total) by mouth 2 (two) times a day as needed for anxiety for up to 14 days.        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    Review of Systems   Constitutional:  Positive for fatigue.   HENT:  Negative for trouble swallowing.    Respiratory:  Negative for cough, chest tightness and shortness of breath.    Cardiovascular:  Negative for chest pain and leg swelling.   Gastrointestinal:  Negative for abdominal distention, anal bleeding, blood in stool, nausea and vomiting.   Genitourinary:  Negative for dysuria and hematuria.   Musculoskeletal:  Positive for myalgias.   Neurological:  Positive for speech difficulty and weakness.   Psychiatric/Behavioral:  Positive for confusion and hallucinations. Negative for sleep disturbance. The patient is not nervous/anxious.      Physical Exam  Constitutional:       General: He is not in acute distress.     Appearance: He is cachectic. He is ill-appearing.   HENT:      Mouth/Throat:      Mouth: Mucous membranes are moist.      Pharynx: Oropharynx is clear.   Cardiovascular:      Rate and Rhythm: Normal rate. Rhythm irregular.      Pulses:           Radial pulses are 1+ on the right side and 1+ on the left side.        Dorsalis pedis pulses are 1+ on the right side and 1+ on the left side.      Heart sounds: Normal heart sounds.   Pulmonary:      Effort: Pulmonary effort is normal.      Breath sounds: Decreased air movement present. Examination of the right-upper field reveals decreased breath sounds. Examination of the left-upper field reveals decreased breath sounds. Examination of the right-middle field reveals decreased breath sounds.  Examination of the right-lower field reveals decreased breath sounds and rales. Examination of the left-lower field reveals decreased breath sounds and rales. Decreased breath sounds and rales present. No wheezing or rhonchi.   Abdominal:      General: The ostomy site is clean. Bowel sounds are normal.      Palpations: Abdomen is soft.      Tenderness: There is no abdominal tenderness.       Genitourinary:     Comments: Will catheter draining clear, yellow urine  Musculoskeletal:      Right lower leg: No edema.      Left lower leg: No edema.      Comments: Debilitated   Skin:     Capillary Refill: Capillary refill takes less than 2 seconds.      Coloration: Skin is pale.      Findings: No wound.   Neurological:      Mental Status: He is alert. Mental status is at baseline. He is confused.      Motor: Weakness present.      Gait: Gait abnormal.   Psychiatric:         Attention and Perception: He perceives auditory and visual hallucinations.         Mood and Affect: Affect normal.         Behavior: Behavior is slowed. Behavior is cooperative.         Cognition and Memory: Cognition is impaired. Memory is impaired.     Data Review  Labs Reviewed: I have reviewed the patient's labs to the time of note.  No new clinical concern.  Radiology and Imaging Reviewed: No new Imaging  Cardiac Studies Reviewed: No new Studies  Medicine Tests Reviewed: No New Tests  [facility] [place]  AWARxE: Query reviewed and no concerns    Palliative Assessment    Palliative Performance Scale: 40%  Mid Upper Arm Circumference (MUAC):  21.5 cm 3/11/25  ESASr: Pain 0 Tiredness 2 Drowsiness 0 Nausea 0 Lack of Appetite 2 Shortness of Breath 0 Depression 0 Anxiety 0 Best Well Being 8  Palliative Disease State: Deteriorating despite treatments, At high risk for hospitalization, and At high risk for emergency department visits  Patient Prognostic Awareness: Patient unable to participate in discussion about disease process or  prognosis  Palliative Care Risk Stratification: Level Three- High risk.  Requires highest level of intervention.  Not likely to return to usual care  Spiritual Assessment: Karoline and Belief: Sabianist  Environmental Assessment:  Home environment safe and suitable for care  Caregiver [place]: No burden of care issues identified and Caregiver willing and able to safely provide needed care    Goals of Care  Code status: Do not resuscitate  Advance Directives: Document type(s) POLST completed and uploaded into EHR  Healthcare Proxy Name, Contact Information, and Relationship: SonMarko  Goals of Care Discussion: Yes  Change in medical orders resulting from advance care planning discussions: Decision to change code status    Advance Care Planning    Palliative Home Based Care Advance Care Planning Note      Patient Name: Samy Elena                                                                                 Patient MRN: [id number]  Discussion Date: 04/09/25   Discussion Participants: son and caregiverMelinda   Start time: 1:31 PM  End time: 2:16 PM    Patients current medical state including medical necessity for ACP discussion:    Patient with poor prognosis measured in months or weeks rather than years, Patient with life limiting illness, Patient with frequent rehospitalizations, Patient with frequent visits to the emergency department, and Patient at risk for overmedicalized death  Todays participants wished to discuss advance care planning. The patient was present for the conversation and unable to participate due to cognitive impairment. The following summarizes our discussion.     Moderated an extensive discussion between patient and two sons regarding his Goals of Care. Patient desire to age in place. His diagnoses and prognoses were reviewed. Explanation provided that patient's condition is worsening and that he most likely will require ongoing hospitalizations to manage his many projected  chronic disease exacerbations. Reviewed with patient that he now has the time to put supports in place so that he can pass at home surrounded by his family. Reviewed hospice benefit with family who agreed to open the conversation up to all four sons. Family to follow up with office with hospice decision. 4/9/25 update: Spoke with son, Marko, who reports family would like patient to follow up with a few specialist visits over the next two weeks and will then likely elect hospice    During our visit today, we discussed:     Medical Status/Prognosis  Days to Weeks     Goals of Care  Goals are Palliative with a focus on improving quality of life and Comfort Focused  Patient wants to focus on minimizing anxiety, shortness of breath, and pain. Patient is very concerned he will experience another hemorrhaging episode. Reassured patient that he would be taking to the hospital to manage any hemorrhaging event.     Treatment Decisions/ types of medical care preferred by the patient  Weighing the options of returning to the hospital for treatment v. remaining at home with hospice services    Advance Care Documents  Patient has completed health care power of  paperwork and Counseled patient/family on GOC, completed health care living will, completed health care power of  paperwork, and completed POLST, and uploaded a copy to EHR.    Patient Capacity  Patient has unreliable capacity to make complex medical decision making.    Attestation Statement:  I have spent a total of 45 minutes in face-to-face discussion of patient advance care planning with the patient and surrogate decision makers.  No active management of the patients problem(s) was undertaken during the time period reported    HEIDI Machado  4/9/2025 4:37 PM      The start time of this Face to Face visit was 1:00 PM. The end time of this Face to Face visit was 1:30 PM AM. I spent  75 minutes on this date of service performing the following  activities: obtaining history, performing examination, entering orders, documenting, preparing for visit, obtaining / reviewing records, providing counseling and education, and independently reviewing study/studies. This time excludes time spent discussing ACP.

## 2025-04-08 ENCOUNTER — HOME VISIT (OUTPATIENT)
Dept: PALLIATIVE CARE | Facility: CLINIC | Age: 89
End: 2025-04-08
Payer: MEDICARE

## 2025-04-08 VITALS
HEART RATE: 62 BPM | DIASTOLIC BLOOD PRESSURE: 48 MMHG | OXYGEN SATURATION: 95 % | TEMPERATURE: 97.2 F | RESPIRATION RATE: 20 BRPM | SYSTOLIC BLOOD PRESSURE: 88 MMHG

## 2025-04-08 DIAGNOSIS — Z93.2 ILEOSTOMY IN PLACE (CMS/HCC): ICD-10-CM

## 2025-04-08 DIAGNOSIS — Z87.440 HISTORY OF RECURRENT UTIS: ICD-10-CM

## 2025-04-08 DIAGNOSIS — I48.20 CHRONIC ATRIAL FIBRILLATION (CMS/HCC): ICD-10-CM

## 2025-04-08 DIAGNOSIS — Z51.5 PALLIATIVE CARE BY SPECIALIST: Primary | ICD-10-CM

## 2025-04-08 DIAGNOSIS — F41.9 ANXIETY: ICD-10-CM

## 2025-04-08 DIAGNOSIS — I50.20 HFREF (HEART FAILURE WITH REDUCED EJECTION FRACTION) (CMS/HCC): ICD-10-CM

## 2025-04-08 DIAGNOSIS — R53.81 DEBILITY: ICD-10-CM

## 2025-04-08 PROBLEM — N39.0 COMPLICATED UTI (URINARY TRACT INFECTION): Status: RESOLVED | Noted: 2025-01-01 | Resolved: 2025-01-01

## 2025-04-08 PROCEDURE — 99350 HOME/RES VST EST HIGH MDM 60: CPT | Mod: 25

## 2025-04-08 PROCEDURE — 99497 ADVNCD CARE PLAN 30 MIN: CPT | Mod: 25

## 2025-04-08 RX ORDER — CLONAZEPAM 0.5 MG/1
0.25 TABLET ORAL 2 TIMES DAILY PRN
Qty: 14 TABLET | Refills: 0 | Status: SHIPPED | OUTPATIENT
Start: 2025-04-08 | End: 2025-04-22 | Stop reason: SDUPTHER

## 2025-04-08 ASSESSMENT — PAIN SCALES - GENERAL: PAINLEVEL_OUTOF10: 0-NO PAIN

## 2025-04-08 NOTE — ASSESSMENT & PLAN NOTE
Experiencing episodes of confusion and anxiety  4/8/25 Start clonazepam. 4/9/25 Spoke with son, Marko, who confirmed patient received medication hs and slept much better overnight. There were no episodes of overnight anxiety or confusion. Palliative to continue to closely follow patient  Consider 02 and morphine concentrate for symptom management at next visit

## 2025-04-08 NOTE — ASSESSMENT & PLAN NOTE
Denies urinary symptoms  Will without sediment, no evidence of infection  Monitor Will output. Encourage fluids as tolerated

## 2025-04-18 ENCOUNTER — HOSPITAL ENCOUNTER (OUTPATIENT)
Dept: RADIOLOGY | Facility: HOSPITAL | Age: 89
Discharge: HOME | End: 2025-04-18
Attending: PHYSICIAN ASSISTANT | Admitting: RADIOLOGY
Payer: MEDICARE

## 2025-04-18 ENCOUNTER — APPOINTMENT (OUTPATIENT)
Dept: RADIOLOGY | Facility: HOSPITAL | Age: 89
End: 2025-04-18
Attending: PHYSICIAN ASSISTANT
Payer: MEDICARE

## 2025-04-18 VITALS
HEART RATE: 85 BPM | RESPIRATION RATE: 18 BRPM | SYSTOLIC BLOOD PRESSURE: 103 MMHG | OXYGEN SATURATION: 99 % | DIASTOLIC BLOOD PRESSURE: 51 MMHG

## 2025-04-18 DIAGNOSIS — J90 PLEURAL EFFUSION: ICD-10-CM

## 2025-04-18 PROCEDURE — 25000000 HC PHARMACY GENERAL: Performed by: PHYSICIAN ASSISTANT

## 2025-04-18 PROCEDURE — C1729 CATH, DRAINAGE: HCPCS

## 2025-04-18 PROCEDURE — 36100330 IR THORACENTESIS

## 2025-04-18 PROCEDURE — 0W9B3ZZ DRAINAGE OF LEFT PLEURAL CAVITY, PERCUTANEOUS APPROACH: ICD-10-PCS | Performed by: RADIOLOGY

## 2025-04-18 PROCEDURE — 71045 X-RAY EXAM CHEST 1 VIEW: CPT

## 2025-04-18 RX ORDER — LIDOCAINE HYDROCHLORIDE 10 MG/ML
INJECTION, SOLUTION INFILTRATION; PERINEURAL
Status: COMPLETED | OUTPATIENT
Start: 2025-04-18 | End: 2025-04-18

## 2025-04-18 RX ADMIN — LIDOCAINE HYDROCHLORIDE 10 ML: 10 INJECTION, SOLUTION INFILTRATION; PERINEURAL at 10:23

## 2025-04-18 NOTE — POST-PROCEDURE NOTE
Interventional Radiology Brief Postprocedure Note    Samy Elena     Attending: SOPHIA Seals / Gigi Berry M.D.    Assistant: n/a    Diagnosis: symptomatic effusion    Description of procedure: US guided left thora    Contrast: none     Anesthesia:  Local (Lidocaine)    Volume of Lidocaine Utilized (ml): 10ml     Medications: none    Complications: None      Estimated Blood Loss: Estimated Blood Loss: None    Anticoagulation: n/a    Specimens: 700 ml clear yellow fluid    Findings: Successful US Guided left thora with 700 ml removed. VSS Throughout. CXR pendign.    4/18/2025 10:30 AM

## 2025-04-18 NOTE — DISCHARGE INSTRUCTIONS
THORACENTESIS    DISCHARGE INSTRUCTIONS  You have had a thoracentesis, or the fluid removed from around your lung.  You may resume your normal diet.  You may resume your normal medications.   Do not drive, engage in heavy lifting or strenuous activity or drink any alcoholic beverages for the next 24 hours.  You may resume normal activity in 24 hours, as tolerated.  Keep the area covered and dry for the next 24 hours. Do not submerge the area in water (i.e. tub baths, hot tubs or swimming pools for 5 days). You may shower.   It is normal to experience some pain at the site over the next few days. You may take Tylenol for that pain.  Notify your primary physician and/or Interventional Radiologist IMMEDIATELY if any of the following occur:  · Fever of 101° F (38 ° C) or chills  · Swelling and or redness in the area of the puncture site  · Worsening pain  · Lightheadedness or dizziness upon standing  - Worsening shortness of breath  - Sudden severe chest pain.  Physician Contact Information  If you have a problem that you believe requires immediate attention, you should go to the Emergency Room, either at [facility] or the closest hospital. If you believe that your problem can safely wait until you speak to a physician, you should contact your doctor or Interventional Radiologist.   It is usually easier to contact your own physician by calling the office, or after hours through the answering service. If you do not know the number, the hospital  can connect you by calling [phone].   If you have concerns that need to be answered by the Interventional Radiologist, you can reach him/ her as follows:   During regular weekday hours (8AM to 5PM), call [phone] and ask the technologist to connect you with the Interventional Radiologist.   During off hours for emergencies call [phone] and ask the hospital  to contact the Interventional Radiologist on-call.    [facility] strongly recommends that you visit a Primary Care Provider (PCP) regularly. Your PCP can help you implement the recommendations we gave you today, coordinate care among your specialists, as well as make sure you are up to date with wellness exams, immunizations and preventive screenings. Your PCP can also help when you are feeling sick, potentially avoiding the need for urgent care or emergency department visits. For these reasons, it is important that you follow up with your PCP at least annually or more often based upon your medical conditions. If you do not have a PCP, please call [phone] ([phone]) or go to Find a Doctor for help with finding one.

## 2025-04-18 NOTE — OR SURGEON
Pre-Procedure patient identification:  I am the primary operating surgeon/proceduralist and I have reviewed the applicable pathology reports and radiology studies for this procedure. I have identified the patient on 04/18/25 at 10:13 AM SOPHIA Seals

## 2025-04-20 ASSESSMENT — ENCOUNTER SYMPTOMS
FATIGUE: 1
BLOOD IN STOOL: 0
HEMATURIA: 0
VOMITING: 0
NAUSEA: 0
ANAL BLEEDING: 0
SPEECH DIFFICULTY: 1
DYSURIA: 0
MYALGIAS: 1
WEAKNESS: 1
CHEST TIGHTNESS: 0
COUGH: 0
SHORTNESS OF BREATH: 0
SLEEP DISTURBANCE: 0
ABDOMINAL DISTENTION: 0
NERVOUS/ANXIOUS: 0

## 2025-04-20 NOTE — ASSESSMENT & PLAN NOTE
Debility likely multifactorial in the setting of advanced cardiac disease  Frail, generalized sarcopenia  Albumin: 2.8  PPS: 40%.   ADLs: Non-weight bearing. Requires the assistance of one to two for transfers and bed mobility. Full assistance of one for bathing, toileting, and dressing. Uses wheelchair for locomotion  Maintain fall risk precautions  Patient remains high risk for further deterioration and functional decline

## 2025-04-20 NOTE — ASSESSMENT & PLAN NOTE
Follows with Cardiology  Asymptomatic  EF 40-45%  Need for recurrent thoracenteses in the setting of left pleural effusions. Most recent procedure removed 700 mL   Monitor for restlessness and new onset of shortness of breath

## 2025-04-20 NOTE — ASSESSMENT & PLAN NOTE
Denies urinary symptoms  Will without sediment, no evidence of infection  Encourage fluids as tolerated

## 2025-04-20 NOTE — ASSESSMENT & PLAN NOTE
Improved  Continue clonazepam hs prn  Palliative to continue to closely follow medication safety and efficacy  Consider 02 and morphine concentrate for future symptom management

## 2025-04-20 NOTE — PROGRESS NOTES
Home Based Palliative Medicine Established Visit    Patient ID: Samy Elena is a 89 y.o. male  MRN: [id number]  Date of Visit: 4/22/2025  Referring Physician: No referring provider defined for this encounter.  Primary Care Physician: Zachery Hernandez MD  Reason for Visit: Palliative Care Follow-up    History of Present Illness  Source: Patient, Caregiver, Medical Records, and Son  89-year-old male received seated in wheelchair in living room accompanied by his HHA, Melinda, and his son, Marko. Clonazepam administered at bedtime has been very effective at managing patient's anxiety. Son and caregiver report patient now sleeping through the night. He is no longer up during the night, being combative, and punching at the walls. Patient has been in a better mood with an improved appetite. Patient has been better able to engage socially. No recent falls. Recent left-sided thoracentesis drained 700 mL of fluid. Regular bowel movements via ileostomy.     Assessment/Plan   Diagnoses and all orders for this visit:    Palliative care by specialist (Primary)  Assessment & Plan:  Code Status: DNR/DNI  Patient does not have capacity to make medication decisions due to intermittent confusion  POA: Son, Marko Elena, [phone]  Advanced Directives: POLST and POA in EHR  Treatment decisions/Goals of Care: Agreeable to return to the hospital if comfort needs cannot be met at home, accepts antibiotics for comfort, declines artificial hydration and nutrition  Family Support: Patient resides at home with live-in caregiver. Three local sons are active in patient's care. One additional son also involved who resides in [place], [place]  Spiritual Support: Methodist. Receives weekly Eucharistic  visits from local [facility]  Next Palliative Follow Up Visit: 2 weeks      HFrEF (heart failure with reduced ejection fraction) (CMS/[facility])  Assessment & Plan:  Follows with Cardiology  Asymptomatic  EF 40-45%  Need for recurrent thoracenteses  in the setting of left pleural effusions. Most recent procedure removed 700 mL   Monitor for restlessness and new onset of shortness of breath      Dysphagia, unspecified type  Assessment & Plan:  Asymptomatic in the setting of dysphonia  Offer small, frequent meals  Remain upright for all meals  Monitor for aspiration      Ileostomy in place (CMS/[facility])  Assessment & Plan:  Patent, draining liquid brown stool  No skin breakdown around ostomy        Gastrointestinal hemorrhage associated with intestinal diverticulosis  Assessment & Plan:  Asymptomatic  Patient will seek transfusion therapy  Increased fluids as tolerated  Monitor for blood in ostomy / perry bags and for rectal bleeding      History of recurrent UTIs  Assessment & Plan:  Denies urinary symptoms  Perry without sediment, no evidence of infection  Encourage fluids as tolerated      Anxiety  Assessment & Plan:  Improved  Continue clonazepam hs prn  Palliative to continue to closely follow medication safety and efficacy  Consider 02 and morphine concentrate for future symptom management    Orders:  -     clonazePAM (KlonoPIN) 0.5 mg tablet; Take 1 tablet (0.5 mg total) by mouth nightly as needed for anxiety.    Debility  Assessment & Plan:  Debility likely multifactorial in the setting of advanced cardiac disease  Frail, generalized sarcopenia  Albumin: 2.8  PPS: 40%.   ADLs: Non-weight bearing. Requires the assistance of one to two for transfers and bed mobility. Full assistance of one for bathing, toileting, and dressing. 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    Review of Systems   Constitutional:  Positive for fatigue.   HENT:  Negative for trouble swallowing.    Respiratory:  Negative for cough, chest tightness and shortness of breath.    Cardiovascular:  Negative for chest pain and leg swelling.   Gastrointestinal:  Negative for abdominal distention, anal bleeding, blood in stool, nausea and vomiting.   Genitourinary:  Negative for dysuria and hematuria.   Musculoskeletal:  Positive for myalgias.   Neurological:  Positive for speech difficulty and weakness.   Psychiatric/Behavioral:  Negative for behavioral problems, confusion, dysphoric mood and sleep disturbance. The patient is not nervous/anxious.      Physical Exam  Constitutional:       General: He is not in acute distress.     Appearance: He is cachectic. He is ill-appearing.   HENT:      Mouth/Throat:      Mouth: Mucous membranes are moist.      Pharynx: Oropharynx is clear.   Cardiovascular:      Rate and Rhythm: Normal rate. Rhythm irregular.      Pulses:           Radial pulses are 1+ on the right side and 1+ on the left side.        Dorsalis pedis pulses are 1+ on the right side and 1+ on the left side.      Heart sounds: Normal heart sounds.   Pulmonary:      Effort: Pulmonary effort is normal.      Breath sounds: Decreased air movement present. Examination of the right-upper field reveals decreased breath sounds. Examination of the left-upper field reveals decreased breath sounds. Examination of the right-middle field reveals decreased breath sounds. Examination of the right-lower field reveals decreased breath sounds and rales. Examination of the left-lower field reveals  decreased breath sounds and rales. Decreased breath sounds and rales present. No wheezing or rhonchi.   Abdominal:      General: The ostomy site is clean. Bowel sounds are normal.      Palpations: Abdomen is soft.      Tenderness: There is no abdominal tenderness.       Genitourinary:     Comments: Will catheter draining clear, yellow urine  Musculoskeletal:      Right lower leg: No edema.      Left lower leg: No edema.      Comments: Debilitated   Skin:     Capillary Refill: Capillary refill takes less than 2 seconds.      Coloration: Skin is pale.      Findings: No wound.   Neurological:      Mental Status: He is alert. Mental status is at baseline. He is confused.      Motor: Weakness present.      Gait: Gait abnormal.   Psychiatric:         Attention and Perception: He perceives auditory and visual hallucinations.         Mood and Affect: Affect normal.         Behavior: Behavior is slowed. Behavior is cooperative.         Cognition and Memory: Cognition is impaired. Memory is impaired.     Data Review  Labs Reviewed: I have reviewed the patient's labs to the time of note.  No new clinical concern.  Radiology and Imaging Reviewed: No new Imaging  Cardiac Studies Reviewed: No new Studies  Medicine Tests Reviewed: No New Tests  [facility] Portal, [facility] AWARxE: Query reviewed and no concerns    Palliative Assessment    Palliative Performance Scale: 40%  Mid Upper Arm Circumference (MUAC):  21.5 cm 3/11/25  ESASr: Pain 0 Tiredness 2 Drowsiness 0 Nausea 0 Lack of Appetite 2 Shortness of Breath 0 Depression 0 Anxiety 0 Best Well Being 8  Palliative Disease State: Deteriorating despite treatments, At high risk for hospitalization, and At high risk for emergency department visits  Patient Prognostic Awareness: Patient unable to participate in discussion about disease process or prognosis  Palliative Care Risk Stratification: Level Three- High risk.  Requires highest level of intervention.  Not likely to return  to usual care  Spiritual Assessment: Karoline and Belief: Mu-ism  Environmental Assessment:  Home environment safe and suitable for care  Caregiver [place]: No burden of care issues identified and Caregiver willing and able to safely provide needed care    Goals of Care  Code status: Do not resuscitate  Advance Directives: Document type(s) POLST completed and uploaded into EHR  Healthcare Proxy Name, Contact Information, and Relationship: SonMarko  Goals of Care Discussion: Yes  Change in medical orders resulting from advance care planning discussions: Decision to change code status    Advance Care Planning    Palliative Home Based Care Advance Care Planning Note      Patient Name: Samy Elena                                                                                 Patient MRN: [id number]  Discussion Date: 04/22/25   Discussion Participants: son and caregiverMelinda   Start time: 1:31 PM  End time: 2:16 PM    Patients current medical state including medical necessity for ACP discussion:    Patient with poor prognosis measured in months or weeks rather than years, Patient with life limiting illness, Patient with frequent rehospitalizations, Patient with frequent visits to the emergency department, and Patient at risk for overmedicalized death  Todays participants wished to discuss advance care planning. The patient was present for the conversation and unable to participate due to cognitive impairment. The following summarizes our discussion.     Moderated an extensive discussion between patient and two sons regarding his Goals of Care. Patient desire to age in place. His diagnoses and prognoses were reviewed. Explanation provided that patient's condition is worsening and that he most likely will require ongoing hospitalizations to manage his many projected chronic disease exacerbations. Reviewed with patient that he now has the time to put supports in place so that he can pass at home  surrounded by his family. Reviewed hospice benefit with family who agreed to open the conversation up to all four sons. Family to follow up with office with hospice decision. 4/9/25 update: Spoke with son, Marko, who reports family would like patient to follow up with a few specialist visits over the next two weeks and will then likely elect hospice    During our visit today, we discussed:     Medical Status/Prognosis  Days to Weeks     Goals of Care  Goals are Palliative with a focus on improving quality of life and Comfort Focused  Patient wants to focus on minimizing anxiety, shortness of breath, and pain. Patient is very concerned he will experience another hemorrhaging episode. Reassured patient that he would be taking to the hospital to manage any hemorrhaging event.     Treatment Decisions/ types of medical care preferred by the patient  Weighing the options of returning to the hospital for treatment v. remaining at home with hospice services    Advance Care Documents  Patient has completed health care power of  paperwork and Counseled patient/family on GOC, completed health care living will, completed health care power of  paperwork, and completed POLST, and uploaded a copy to EHR.    Patient Capacity  Patient has unreliable capacity to make complex medical decision making.    Attestation Statement:  I have spent a total of 45 minutes in face-to-face discussion of patient advance care planning with the patient and surrogate decision makers.  No active management of the patients problem(s) was undertaken during the time period reported    HEIDI Machado  4/22/2025 7:01 PM      The start time of this Face to Face visit was 1:00 PM. The end time of this Face to Face visit was 1:30 PM AM. I spent  75 minutes on this date of service performing the following activities: obtaining history, performing examination, entering orders, documenting, preparing for visit, obtaining / reviewing  records, providing counseling and education, and independently reviewing study/studies. This time excludes time spent discussing ACP.

## 2025-04-22 ENCOUNTER — HOME VISIT (OUTPATIENT)
Dept: PALLIATIVE CARE | Facility: CLINIC | Age: 89
End: 2025-04-22
Payer: MEDICARE

## 2025-04-22 VITALS — DIASTOLIC BLOOD PRESSURE: 58 MMHG | HEART RATE: 87 BPM | SYSTOLIC BLOOD PRESSURE: 98 MMHG | OXYGEN SATURATION: 95 %

## 2025-04-22 DIAGNOSIS — I50.20 HFREF (HEART FAILURE WITH REDUCED EJECTION FRACTION) (CMS/HCC): ICD-10-CM

## 2025-04-22 DIAGNOSIS — Z87.440 HISTORY OF RECURRENT UTIS: ICD-10-CM

## 2025-04-22 DIAGNOSIS — Z93.2 ILEOSTOMY IN PLACE (CMS/HCC): ICD-10-CM

## 2025-04-22 DIAGNOSIS — R13.10 DYSPHAGIA, UNSPECIFIED TYPE: ICD-10-CM

## 2025-04-22 DIAGNOSIS — Z51.5 PALLIATIVE CARE BY SPECIALIST: Primary | ICD-10-CM

## 2025-04-22 DIAGNOSIS — R53.81 DEBILITY: ICD-10-CM

## 2025-04-22 DIAGNOSIS — F41.9 ANXIETY: ICD-10-CM

## 2025-04-22 DIAGNOSIS — K57.91 GASTROINTESTINAL HEMORRHAGE ASSOCIATED WITH INTESTINAL DIVERTICULOSIS: ICD-10-CM

## 2025-04-22 PROCEDURE — 99497 ADVNCD CARE PLAN 30 MIN: CPT | Mod: 25

## 2025-04-22 PROCEDURE — 99350 HOME/RES VST EST HIGH MDM 60: CPT | Mod: 25

## 2025-04-22 RX ORDER — CLONAZEPAM 0.5 MG/1
0.5 TABLET ORAL NIGHTLY PRN
Qty: 30 TABLET | Refills: 0 | Status: SHIPPED | OUTPATIENT
Start: 2025-04-22 | End: 2025-05-22 | Stop reason: SDUPTHER

## 2025-04-22 ASSESSMENT — ENCOUNTER SYMPTOMS
DYSPHORIC MOOD: 0
CONFUSION: 0
TROUBLE SWALLOWING: 0

## 2025-04-22 ASSESSMENT — PAIN SCALES - GENERAL: PAINLEVEL_OUTOF10: 0-NO PAIN

## 2025-04-22 NOTE — ASSESSMENT & PLAN NOTE
Code Status: DNR/DNI  Patient does not have capacity to make medication decisions due to intermittent confusion  POA: Son, Marko Elena, [phone]  Advanced Directives: POLST and POA in EHR  Treatment decisions/Goals of Care: Agreeable to return to the hospital if comfort needs cannot be met at home, accepts antibiotics for comfort, declines artificial hydration and nutrition  Family Support: Patient resides at home with live-in caregiver. Three local sons are active in patient's care. One additional son also involved who resides in [place], [place]  Spiritual Support: Jainism. Receives weekly Eucharistic  visits from local [facility]  Next Palliative Follow Up Visit: 2 weeks

## 2025-04-22 NOTE — ASSESSMENT & PLAN NOTE
Asymptomatic  Patient will seek transfusion therapy  Increased fluids as tolerated  Monitor for blood in ostomy / perry bags and for rectal bleeding

## 2025-04-22 NOTE — ASSESSMENT & PLAN NOTE
Asymptomatic in the setting of dysphonia  Offer small, frequent meals  Remain upright for all meals  Monitor for aspiration

## 2025-04-28 NOTE — PROGRESS NOTES
Hospital to Home final follow-up call   on number: [phone]   Week 4  Left voicemail requesting update of status. Provided ACM contact number for call back.   Pt followed for 30 days for H2H program.   Pt equipped to manage care. Pt instructed to reach out to PCP/specialist for concerns on voicemail.  Closed to care management.       Pilar Kirkpatrick RN BSN   Ambulatory Care Management   [phone]    
Filler: Restylane Defyne
3 = A little assistance
none

## 2025-05-06 ENCOUNTER — HOME VISIT (OUTPATIENT)
Dept: PALLIATIVE CARE | Facility: CLINIC | Age: 89
End: 2025-05-06
Payer: MEDICARE

## 2025-05-06 DIAGNOSIS — I50.20 HFREF (HEART FAILURE WITH REDUCED EJECTION FRACTION) (CMS/HCC): ICD-10-CM

## 2025-05-06 DIAGNOSIS — R53.81 DEBILITY: ICD-10-CM

## 2025-05-06 DIAGNOSIS — Z51.5 PALLIATIVE CARE BY SPECIALIST: Primary | ICD-10-CM

## 2025-05-06 DIAGNOSIS — N30.00 ACUTE CYSTITIS WITHOUT HEMATURIA: ICD-10-CM

## 2025-05-06 DIAGNOSIS — J90 RECURRENT PLEURAL EFFUSION: ICD-10-CM

## 2025-05-06 DIAGNOSIS — R13.10 DYSPHAGIA, UNSPECIFIED TYPE: ICD-10-CM

## 2025-05-06 DIAGNOSIS — F41.9 ANXIETY: ICD-10-CM

## 2025-05-06 PROCEDURE — 99350 HOME/RES VST EST HIGH MDM 60: CPT | Mod: 25

## 2025-05-06 RX ORDER — CEFUROXIME AXETIL 500 MG/1
500 TABLET ORAL 2 TIMES DAILY
COMMUNITY
Start: 2025-05-03 | End: 2025-05-12 | Stop reason: ALTCHOICE

## 2025-05-12 ENCOUNTER — TELEPHONE (OUTPATIENT)
Dept: PALLIATIVE CARE | Facility: CLINIC | Age: 89
End: 2025-05-12
Payer: MEDICARE

## 2025-05-12 DIAGNOSIS — N30.00 ACUTE CYSTITIS WITHOUT HEMATURIA: Primary | ICD-10-CM

## 2025-05-12 DIAGNOSIS — Z87.440 HISTORY OF RECURRENT UTIS: ICD-10-CM

## 2025-05-12 RX ORDER — CEPHALEXIN 500 MG/1
500 CAPSULE ORAL 2 TIMES DAILY
Qty: 14 CAPSULE | Refills: 0 | Status: SHIPPED | OUTPATIENT
Start: 2025-05-12 | End: 2025-05-19

## 2025-05-22 ENCOUNTER — HOSPITAL ENCOUNTER (OUTPATIENT)
Dept: RADIOLOGY | Facility: HOSPITAL | Age: 89
Discharge: HOME | DRG: 689 | End: 2025-05-22
Attending: PHYSICIAN ASSISTANT | Admitting: RADIOLOGY
Payer: MEDICARE

## 2025-05-22 ENCOUNTER — APPOINTMENT (OUTPATIENT)
Dept: RADIOLOGY | Facility: HOSPITAL | Age: 89
DRG: 689 | End: 2025-05-22
Attending: PHYSICIAN ASSISTANT
Payer: MEDICARE

## 2025-05-22 VITALS
DIASTOLIC BLOOD PRESSURE: 58 MMHG | HEART RATE: 76 BPM | SYSTOLIC BLOOD PRESSURE: 106 MMHG | OXYGEN SATURATION: 100 % | RESPIRATION RATE: 18 BRPM

## 2025-05-22 DIAGNOSIS — J90 PLEURAL EFFUSION: ICD-10-CM

## 2025-05-22 DIAGNOSIS — F41.9 ANXIETY: ICD-10-CM

## 2025-05-22 PROCEDURE — 36100330 IR THORACENTESIS

## 2025-05-22 PROCEDURE — C1729 CATH, DRAINAGE: HCPCS

## 2025-05-22 PROCEDURE — 71045 X-RAY EXAM CHEST 1 VIEW: CPT

## 2025-05-22 RX ORDER — CLONAZEPAM 0.5 MG/1
0.5 TABLET ORAL NIGHTLY PRN
Qty: 30 TABLET | Refills: 0 | Status: ON HOLD | OUTPATIENT
Start: 2025-05-22 | End: 2025-06-21

## 2025-05-22 NOTE — OR SURGEON
Pre-Procedure patient identification:  I am the primary operating surgeon/proceduralist and I have reviewed the applicable pathology reports and radiology studies for this procedure. I have identified the patient on 05/22/25 at 11:20 AM SOPHIA Peraza

## 2025-05-22 NOTE — DISCHARGE INSTRUCTIONS
THORACENTESIS    DISCHARGE INSTRUCTIONS  You have had a thoracentesis, or the fluid removed from around your lung.  You will need updated labs (CBC and INR) prior to your next thoracentesis. Please let Layne know when scheduling your next appt.   You may resume your normal diet.  You may resume your normal medications.   Do not drive, engage in heavy lifting or strenuous activity or drink any alcoholic beverages for the next 24 hours.  You may resume normal activity in 24 hours, as tolerated.  Keep the area covered and dry for the next 24 hours. Do not submerge the area in water (i.e. tub baths, hot tubs or swimming pools for 5 days). You may shower.   It is normal to experience some pain at the site over the next few days. You may take Tylenol for that pain.  Notify your primary physician and/or Interventional Radiologist IMMEDIATELY if any of the following occur:  · Fever of 101° F (38 ° C) or chills  · Swelling and or redness in the area of the puncture site  · Worsening pain  · Lightheadedness or dizziness upon standing  - Worsening shortness of breath  - Sudden severe chest pain.  Physician Contact Information  If you have a problem that you believe requires immediate attention, you should go to the Emergency Room, either at [facility] or the closest hospital. If you believe that your problem can safely wait until you speak to a physician, you should contact your doctor or Interventional Radiologist.   It is usually easier to contact your own physician by calling the office, or after hours through the answering service. If you do not know the number, the hospital  can connect you by calling [phone].   If you have concerns that need to be answered by the Interventional Radiologist, you can reach him/ her as follows:   During regular weekday hours (8AM to 5PM), call [phone] and ask the technologist to connect you with the Interventional Radiologist.   During off hours for emergencies  call [phone] and ask the hospital  to contact the Interventional Radiologist on-call.    [facility] strongly recommends that you visit a Primary Care Provider (PCP) regularly. Your PCP can help you implement the recommendations we gave you today, coordinate care among your specialists, as well as make sure you are up to date with wellness exams, immunizations and preventive screenings. Your PCP can also help when you are feeling sick, potentially avoiding the need for urgent care or emergency department visits. For these reasons, it is important that you follow up with your PCP at least annually or more often based upon your medical conditions. If you do not have a PCP, please call [phone] [phone]) or go to Find a Doctor for help with finding one.

## 2025-05-24 ENCOUNTER — APPOINTMENT (EMERGENCY)
Dept: RADIOLOGY | Facility: HOSPITAL | Age: 89
End: 2025-05-24
Payer: MEDICARE

## 2025-05-24 PROBLEM — R41.82 ALTERED MENTAL STATUS, UNSPECIFIED ALTERED MENTAL STATUS TYPE: Status: ACTIVE | Noted: 2025-05-24

## 2025-05-24 PROCEDURE — 71260 CT THORAX DX C+: CPT

## 2025-05-24 PROCEDURE — 71045 X-RAY EXAM CHEST 1 VIEW: CPT

## 2025-05-24 NOTE — ED PROVIDER NOTES
"Emergency Medicine Note    Chief Complaint   Patient presents with    Fever    Difficulty Urinating        HISTORY OF PRESENT ILLNESS      Patient seen and Examined in [id number]     Samy Elena is a 89 y.o. male with PMH of hypertension, hyperlipidemia, ischemic cardiomyopathy, CHF, CAD, TIA, atrial fibrillation currently not on anticoagulation, chronic colostomy who presents to the ED today via EMS for evaluation of altered mental status since 2 days.  Per patient's caregiver, ongoing over the course the past 2 days she has noted patient has been feeling warm to touch with a fever 101F Tmax today as well as some increased confusion with episodes of hallucinations where he will be talking about things that are not there or talking to people that are not there.  Normally he does not do this.  She has additionally noticed that he is having some darker urine than normal and is concerned about a urinary tract infection as he gets like this when he has UTIs.  Does have a history of recurrent UTIs.  Patient does admit to some dysuria.  Per patient, he has no complaints at the moment.  States he feels okay, is feeling a little tired.  Caregiver does not expresses concerns noting patient is \" filled with fluid\" but that is his normal.  He has routine drainages because he gets overloaded.  Sometimes he will get short of breath when overloaded although this has not occurred recently. 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)     Coronary artery disease     Disease of thyroid gland     Will catheter in place     6/25 not at present    GI (gastrointestinal bleed)     Hypertension     " Myocardial infarction (CMS/HCC)     Orthostatic hypotension     TIA (transient ischemic attack)      Past Surgical History   Procedure Laterality Date    cataract extraction right eye with implant and limbal relaxing incision Right 7/18/2024    Performed by Hayder Moses MD at  OR [facility]    Cataract extraction w/ intraocular lens implant Left     Cataract extraction with LRI and anterior vitrectomy left eye Left 11/16/2023    Performed by Hayder Moses MD at  OR [facility]    Cholecystectomy      Colostomy      COLOSTOMY LAPAROSCOPIC N/A 8/3/2022    Performed by Mat Bryant MD at [facility] OR    Coronary artery bypass graft      DIAGNOSTIC FLEXIBLE SIGMOIDOSCOPY WITH COLLECTION OF SPECIMEN BY WASHING N/A 11/27/2024    Performed by David Holcomb MD at [facility] GI    DIAGNOSTIC FLEXIBLE SIGMOIDOSCOPY WITH COLLECTION OF SPECIMEN BY WASHING N/A 10/18/2024    Performed by Maine Lopez MD at [facility]    DIAGNOSTIC UPPER GASTROINTESTINAL ENDOSCOPY WITH COLLECTION OF SPECIMEN BY WASHING N/A 11/27/2024    Performed by David Holcomb MD at [facility]    FLEXIBLE SIGMOIDOSCOPY WITH BIOPSY N/A 10/23/2024    Performed by David Holcomb MD at [facility]    INCISION AND DRAINAGE WITH DEBRIDMENT OF BUTTOCK, AND PERINEUM N/A 8/2/2022    Performed by Mat Bryant MD at [facility] OR    Joint replacement Left     Knee    RIGHT HEART CATH N/A 8/4/2022    Performed by Cristal Lacey MD at [facility] CARDIAC CATH/EP    Thyroidectomy      WASHOUT OF PERINEAL WOUND, COLONIC LAVAGE N/A 8/3/2022    Performed by Mat Bryant MD at [facility] OR     No family history on file.  Social History     Tobacco Use    Smoking status: Never    Smokeless tobacco: Never   Vaping Use    Vaping status: Never Used   Substance Use Topics    Alcohol use: Not Currently     Comment: social    Drug use: Never            ROS     See HPI above.         MEDICATION REVIEW     Current Meds including meds today:    Current Facility-Administered Medications:      aspirin enteric coated tablet 81 mg, 81 mg, oral, Daily, Rick Guerrero MD, 81 mg at 05/25/25 1106    buPROPion XL (WELLBUTRIN XL) 24 hr ER tablet 150 mg, 150 mg, oral, Daily, Rick Guerrero MD, 150 mg at 05/25/25 1106    calcium (as carbonate) (TUMS) chewable tablet 200 mg of elemental calcium, 200 mg of elemental calcium, oral, Daily PRN, Rick Guerrero MD    carboxymethylcellulose (REFRESH PLUS) 0.5 % ophthalmic dropperette 1 drop, 1 drop, Both Eyes, q2h while awake, Rick Guerrero MD, 1 drop at 05/25/25 1808    clonazePAM (klonoPIN) tablet 0.5 mg, 0.5 mg, oral, Nightly PRN, Rick Guerrero MD    cyanocobalamin (VITAMIN B12) tablet 500 mcg, 500 mcg, oral, q AM, Rick Guerrero MD, 500 mcg at 05/25/25 1106    ferrous sulfate tablet 325 mg, 325 mg, oral, Daily with breakfast, Rick Guerrero MD, 325 mg at 05/25/25 1106    finasteride (PROSCAR) tablet 5 mg, 5 mg, oral, Daily, Rick Guerrero MD, 5 mg at 05/25/25 1106    latanoprost (XALATAN) 0.005 % ophthalmic solution 1 drop, 1 drop, Both Eyes, Nightly, Rick Guerrero MD    levothyroxine (SYNTHROID) tablet 100 mcg, 100 mcg, oral, Daily before breakfast, Rick Guerrero MD, 100 mcg at 05/25/25 1106    melatonin capsule 10 mg, 10 mg, oral, Nightly, Rick Guerrero MD    pantoprazole (PROTONIX) tablet,delayed release (DR/EC) 20 mg, 20 mg, oral, Daily, Rick Guerrero MD, 20 mg at 05/25/25 1106    [COMPLETED] piperacillin-tazobactam (ZOSYN) 4.5 g/100 mL IVPB in NSS, 4.5 g, intravenous, Once, Stopped at 05/25/25 2411 **FOLLOWED BY** piperacillin-tazobactam (ZOSYN) 4.5 g/100 mL IVPB in NSS, 4.5 g, intravenous, q6h INT, Rick Guerrero MD, Stopped at 05/25/25 1329    tamsulosin (FLOMAX) 24 hr ER capsule 0.4 mg, 0.4 mg, oral, q AM, Rick Guerrero MD, 0.4 mg at 05/25/25 1106    thiamine (VITAMIN B1) tablet 100 mg, 100 mg, oral, q AM, Rick Guerrero MD, 100 mg at 05/25/25 1106    torsemide (DEMADEX) tablet 20  "mg, 20 mg, oral, BID, Rick Guerrero MD, 20 mg at 05/25/25 1106       EXAM     ED Triage Vitals [05/24/25 1546]   Temp Heart Rate Resp BP SpO2   36.5 °C (97.7 °F) 74 20 (!) 106/57 98 %      Temp Source Heart Rate Source Patient Position BP Location FiO2 (%) (Set)   Oral Monitor Sitting Right upper arm --       PHYSICAL EXAM  GEN: well appearing, well nourished, pleasant male in no acute distress, positioned sitting upright on the stretcher  HEENT: atraumatic, normocephalic, nonicteric sclera   NECK: soft, supple  CV: normal rate and rhythm, no murmurs, rubs, or gallops noted   PULM: normal effort and breathing, no accessory muscle use, CTAB without adventitious breath sounds   ABO: Tender to palpation overlying the suprapubic and right lower quadrant, negative appendiceal signs, ND, normoactive BSx4, no rebound, rigidity, or guarding, no CVAT  MSK: no cyanosis, clubbing, or edema noted. Peripheral pulses equal and symmetric at radial, PT   SKIN: warm, dry, and well perfused, no decreased skin turgor, capillary refills <3 secs  NEURO: A&O x 3, answering questions appropriately  PSYCH: normal mood and affect         PROCEDURES   Procedures     DATA    COURSE AND DATA   Patient Vitals for the past 24 hrs:   BP Temp Temp src Pulse Resp SpO2 Height Weight   05/25/25 1100 108/65 36.7 °C (98 °F) Oral 95 18 95 % -- --   05/25/25 0347 -- -- -- 70 -- -- -- --   05/25/25 0323 (!) 104/52 36.3 °C (97.3 °F) Oral (!) 57 18 95 % -- --   05/24/25 2318 -- -- -- 71 -- -- -- --   05/24/25 2300 (!) 109/53 36.3 °C (97.3 °F) Oral 60 20 97 % -- --   05/24/25 2124 -- -- -- -- -- -- 1.854 m (6' 1\") 73.5 kg (162 lb)   05/24/25 2115 (!) 94/55 36.4 °C (97.5 °F) Temporal 65 20 99 % -- --   05/24/25 2056 (!) 101/50 -- -- 61 19 97 % -- --   05/24/25 2016 (!) 97/53 -- -- 66 (!) 21 97 % -- --     Labs Reviewed   CBC AND DIFF - Abnormal       Result Value    WBC 3.81      RBC 2.61 (*)     Hemoglobin 8.8 (*)     Hematocrit 28.3 (*)     .4 " (*)     MCH 33.7 (*)     MCHC 31.1 (*)     RDW 18.1 (*)     Platelets 91 (*)     MPV 11.3      Differential Type Auto      nRBC 0.0      Immature Granulocytes 1.0      Neutrophils 70.9      Lymphocytes 12.3      Monocytes 12.1      Eosinophils 2.9      Basophils 0.8      Immature Granulocytes, Absolute 0.04      Neutrophils, Absolute 2.70      Lymphocytes, Absolute 0.47 (*)     Monocytes, Absolute 0.46      Eosinophils, Absolute 0.11      Basophils, Absolute 0.03      PLT Morphology Normal      Platelet Estimate Decreased (51,000-149,000)      Anisocytosis 1+      Hypochromia 1+      Macrocytes 1+      Ovalocytes 1+      Poikilocytes 1+      Teardrop Cells Occasional     COMPREHENSIVE METABOLIC PANEL - Abnormal    Sodium 143      Potassium 3.9      Chloride 111 (*)     CO2 25      BUN 29 (*)     Creatinine 1.9 (*)     Glucose 151 (*)     Calcium 8.4 (*)     AST (SGOT) 30      ALT (SGPT) 6 (*)     Alkaline Phosphatase 87      Total Protein 7.7      Albumin 3.1 (*)     Bilirubin, Total 0.8      eGFR 32.5 (*)     Anion Gap 7     UA REFLEX CULTURE (MACROSCOPIC) - Abnormal    Color, Urine Yellow      Clarity, Urine Cloudy (*)     Specific Gravity, Urine 1.010      pH, Urine 6.0      Leukocyte Esterase +3 (*)     Nitrite, Urine Negative      Protein, Urine Trace (*)     Glucose, Urine Negative      Ketones, Urine Negative      Urobilinogen, Urine 0.2      Bilirubin, Urine Negative      Blood, Urine Trace (*)    UA MICROSCOPIC (UCU) - Abnormal    RBC, Urine 5 TO 9 (*)     WBC, Urine Too Numerous To Count (*)     Squamous Epithelial Rare (*)     Hyaline Cast 3 TO 9 (*)     Bacteria, Urine +1 (*)     Mucus Rare (*)     Yeast, Urine Rare (*)     Yeast, Hyphae, Urine +1 (*)    CBC - Abnormal    WBC 5.04      RBC 2.55 (*)     Hemoglobin 8.5 (*)     Hematocrit 27.6 (*)     .2 (*)     MCH 33.3 (*)     MCHC 30.8 (*)     RDW 17.8 (*)     Platelets 85 (*)     MPV 10.8     BASIC METABOLIC PANEL - Abnormal    Sodium 142       Potassium 3.9      Chloride 111 (*)     CO2 23      BUN 28 (*)     Creatinine 1.6 (*)     Glucose 149 (*)     Calcium 8.1 (*)     eGFR 39.9 (*)     Anion Gap 8     PROTIME-INR - Abnormal    PT 17.5 (*)     INR 1.5     POCT GLUCOSE (BEAKER) - Abnormal    POCT Bedside Glucose 147 (*)     POC Test POC     SARS-COV-2 (COVID-19)/ FLU A/B, AND RSV, PCR - Normal    SARS-CoV-2 (COVID-19) Negative      Influenza A Negative      Influenza B Negative      Respiratory Syncytial Virus Negative      Narrative:     Testing performed using real-time PCR for detection of COVID-19. EUA approved validation studies performed on site.    URINE CULTURE - Normal    Urine Culture Young Growth     LACTATE, VENOUS - Normal    Lactate 1.6     TSH 3RD GENERATION - Normal    TSH 0.87     VITAMIN B12 - Normal    Vitamin B-12 823     FOLATE - Normal    Folate 16.6     BLOOD CULTURE   BLOOD CULTURE   URINALYSIS REFLEX CULTURE (ED AND OUTPATIENT ONLY)    Narrative:     The following orders were created for panel order UA with Reflex Culture.                  Procedure                               Abnormality         Status                                     ---------                               -----------         ------                                     UA Reflex to Culture (Ma...[[id number]]  Abnormal            Final result                               UA Microscopic[[id number]]               Abnormal            Final result                                                 Please view results for these tests on the individual orders.   RAINBOW DRAW PANEL    Narrative:     The following orders were created for panel order [place] Draw Panel.                  Procedure                               Abnormality         Status                                     ---------                               -----------         ------                                     RAINBOW PINK[[id number]]                                     Final result                                RAINBOW LT CECILIA[[id number]]                                  Final result                               TUCKER DEXTER[[id number]]                                     Final result                                                 Please view results for these tests on the individual orders.   POCT GLUCOSE   POCT GLUCOSE   POCT GLUCOSE   POCT GLUCOSE (BEAKER)    POCT Bedside Glucose 99      POC Test POC     RAINBOW PINK   RAINBOW LT BLUE   RAINBOW GOLD     CT HEAD WITHOUT IV CONTRAST   Final Result   IMPRESSION: No evidence of hemorrhage, mass or acute territorial infarction by   CT criteria.         COMMENT:      Technique: Computed tomography of the brain was performed utilizing contiguous   2.5 mm transaxial sections without intravenous contrast administration.      CT DOSE:  One or more dose reduction techniques (e.g. automated exposure   control, adjustment of the mA and/or kV according to patient size, use of   iterative reconstruction technique) utilized for this examination.      Comparison studies: Head CT 2/16/2025.      Postsurgical change: None.      Brain parenchyma: There is no intraparenchymal hemorrhage, mass effect, midline   shift or extra-axial fluid collection.   White matter changes: Normal for age   Ventricles, cisterns, and sulci: Normal in size and configuration.   Sella: Normal in appearance.   Cerebellar tonsils: Normal.         Calvarium and extra cranial soft tissues: Normal.   Paranasal sinuses: Clear bilaterally.   Mastoid air cell: Normal.   Orbits: Normal.            CT CHEST/ABDOMEN/PELVIS WITH IV CONTRAST   Final Result   IMPRESSION:   1.  Bilateral pleural effusions, left greater than right with associated   consolidation.      2.  Cardiomegaly with enlarged inferior vena cava and findings suggestive of   right-sided heart failure.      3. 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               X-RAY CHEST 1 VIEW   Final Result   IMPRESSION:       No interval change in appearance of the chest.  There is cardiomegaly and a   large pleural parenchymal process in the left lung.      ECG 12 lead                        MDM / ED COURSE / CLINICAL IMPRESSION / DISPO     89 y.o. male with past medical history as above presenting with confusion with concern for UTI    Based on history and presentation, differentials include urinary tract infection, less likely sepsis, pneumonia, electrolyte derangements, appendicitis less likely, do not suspect acute intra-abdominal etiology otherwise.    After my initial evaluation, the patient requires testing, treatment and/or serial reevaluations to rule in or rule out the need for admission and/or emergency surgery: Yes     Will obtain some a sepsis workup in the ED today.     Will provide the patient with antibiotics and reassess periodically.  Patient deferring pain management in the ED, will do gentle fluids in the history of reduced EF    See orders for and or ED course for more detailed ordered tests and medications.     Patient being brought into the ED by home health care with concern for increased confusion and hallucinations, change from his baseline.  Normally completely with it but lately has been hallucinating, more confused and forgetful ongoing over the course of the past 3 days, this is an acute change.  Additionally noted his urine was darker and he was complaining of some dysuria prompting concern for UTI with prior history of metabolic encephalopathy secondary to UTI.  Large workup done in the ED including CT chest abdomen pelvis in the setting of some abdominal tenderness and left-sided effusion on x-ray.  Patient with mildly low blood pressures in the ED although truly not hypotensive, no leukocytosis with normal lactic acid with urinalysis showing significant infection.  Was signed out to nighttime team and ultimately admitted to [facility] in the setting of metabolic encephalopathy due to UTI    The patient has  these following underlying medical co-morbilities which may affect their recovery/prognosis: Cardiovascular: Hypertension, Hyperlipidemia, Atrial Fibrillation, and CHF and Neurologic: TIA    MDM Data    Independent historian: Caregiver:   and EMS:      I reviewed the follwing external documents: Outside Records: Outpatient office records, which show patient follows with cardiology, routine pleural effusion drainages with IR, has home health care as well and Prior hospital admission, which show patient last evaluated in the ED April 2025 for he was ultimately admitted to the hospital in the setting of word finding difficulty with complicated UTI and encephalopathy    I independently interpreted the following studies (see ED course): Labs:  , EKG:  , X-Ray:  , and CT:      I spoke with the following providers/staff about the patients management plan: Admitting Provider discussion: [facility]    The patient may have these social determents which will impact their treatment plan: None    Decision rules/scores evaluated: None Reviewed      Treatment and Disposition:    Admission    The patient expressed understanding of and agreement with all items discussed and further progression of care plan.     ED Course as of 05/25/25 1903   Sat May 24, 2025   1718 Last EF on file 9/20/2024 at 40 to 45% [DJ]   1852 CBC and differential(!)  No leukocytosis, no thrombocytosis.  Mild anemia and thrombocytopenia similar compared to prior [DJ]   1853 Comprehensive metabolic panel(!)  Mildly elevated creatinine at 1.9 compared to baseline of 1.4-1.5, no severe electrolyte derangements, transaminitis otherwise [DJ]   1853 SARS-COV-2 (COVID-19)/ FLU A/B, AND RSV, PCR Nasopharynx  Negative [DJ]   1853 UA with Reflex Culture(!)  Numerous pyuria concerning for UTI in the setting of current dysuria [DJ]   1854 X-RAY CHEST 1 VIEW  Independent interpretation, left effusion noted?    --  IMPRESSION:     No interval change in appearance of the chest.   There is cardiomegaly and a  large pleural parenchymal process in the left lung.   [DJ]   1858 Prior abnormal culture on file ESBL susceptible to Zosyn [DJ]   1914 Sign out received from morning PA-C DJ pending CT Chest/AP and admission for acute encephalopathy most likely d/t UTI. Also with mild CALI. Starting on zosyn. Pt with chronic left sided pleural effusion. Getting CT chest to further evaluate.  [DO]   1915 Signout to SELINA LACEY.  Pending CT chest abdomen pelvis prior to admission to [facility] in the setting of altered mental status with current UTI [DJ]   1949 CT CHEST/ABDOMEN/PELVIS WITH IV CONTRAST  IMPRESSION:  1.  Bilateral pleural effusions, left greater than right with associated consolidation.  2.  Cardiomegaly with enlarged inferior vena cava and findings suggestive of  right-sided heart failure.  3. 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   [DO]   2007 [facility] notified of admission [DO]   2046 Pt admitted to [facility] [DO]      ED Course User Index  [DJ] Shelly Bond PA C  [DO] Whit Alfaro PA C      IMPRESSION:   Final diagnoses:   [N39.0] Lower urinary tract infectious disease   [R41.82] Altered mental status, unspecified altered mental status type   [J18.9] Pneumonia due to infectious organism, unspecified laterality, unspecified part of lung        FOLLOW UP & MEDS     Follow Up  No follow-up provider specified.    Discharge Meds  ED Prescriptions    None          ADMITTED  ------------------------------------------------  Shelly Bond PA-C  5/25/2025 @ 7:03 PM  ------------------------------------------------     Shelly Bond PA C  05/25/25 1903

## 2025-05-25 ENCOUNTER — APPOINTMENT (INPATIENT)
Dept: RADIOLOGY | Facility: HOSPITAL | Age: 89
End: 2025-05-25
Attending: HOSPITALIST
Payer: MEDICARE

## 2025-05-25 PROBLEM — N30.00 ACUTE CYSTITIS WITHOUT HEMATURIA: Status: ACTIVE | Noted: 2025-05-24

## 2025-05-25 PROBLEM — G92.8 TOXIC METABOLIC ENCEPHALOPATHY: Status: ACTIVE | Noted: 2024-07-28

## 2025-05-25 PROCEDURE — 70450 CT HEAD/BRAIN W/O DYE: CPT

## 2025-05-25 NOTE — ASSESSMENT & PLAN NOTE
- suspect due to UTI, but given Afib not on AC and also thrombocytopenia will obtain CT head to r/o intracranial pathology, this is likely relative low yield.

## 2025-05-25 NOTE — ASSESSMENT & PLAN NOTE
- Debility likely multifactorial in the setting of HFrEF, TR, afib, recurrent pleural effusion, recurrent GIB.    - Living situation prior to hospitalization home with caregiver.   - Baseline functional status is wheelchair for ambulation.   - Current Palliative Performance Status: 40%  - Baseline Palliative Performance Status: 40%   - Patient remains high risk for further deterioration and functional decline.    Plan:  - continue current plan of care  - Palliative care will continue to follow for complex medical decision making  - If any decline, recommend family meeting with his sons.

## 2025-05-25 NOTE — ASSESSMENT & PLAN NOTE
- chronic, stable. With macrocytic anemia and thrombocytopenia, there is c/f underlying MDS. Given chronicity and stability, outpatient hematology f/u is likely appropriate, and depends on goals of care.

## 2025-05-25 NOTE — PLAN OF CARE
Problem: Adult Inpatient Plan of Care  Goal: Plan of Care Review  Outcome: Progressing  Flowsheets (Taken 5/25/2025 3262)  Progress: no change  Outcome Evaluation: Patient confused. Pleasant. Yelling out for family members. Manageable.  Plan of Care Reviewed With: patient  Goal: Patient-Specific Goal (Individualized)  Outcome: Progressing   Plan of Care Review  Plan of Care Reviewed With: patient  Progress: no change  Outcome Evaluation: Patient confused. Pleasant. Yelling out for family members. Manageable.

## 2025-05-25 NOTE — ASSESSMENT & PLAN NOTE
-UA w/ pyuria, LE positive, no nitrites; dysuria , acute AMS  -Hx of ESBL but previously susceptible to Zosyn  -Continue zosyn  -Monitor for clinical response, WBC trends  -Adjust antibiotics if cultures indicate resistance  -Monitor for urosepsis, although afebrile and lactate normal    CALI on CKD  -Cr 1.9/BUN 29, baseline about 1.5   -Likely pre-renal azotemia in setting of acute infection/dehydration  -Bladder scan reveals 160cc  -Trend CMP daily, strict I&Os  -Avoid nephrotoxic medications, meds renally dosed    Chronic Macrocytic anemia  Chronic Thrombocytopenia  -Hgb 8.8 on presentation, Baseline Hb ~8-9  -.4, plts 91  -Follow up B12, folate  -Monitor CBC  -No signs of overt GIB    CAD  HFeEF  HTN  -History of CABG in 1996  -No recent stress test or cath available on file  -Previously on plavix and aspirin; Plavix had been discontinued over the last several months due to recurrent GI bleeds.  -Continue aspirin 81 mg daily  -Hypotensive on admission-hold home torsemide 20 mg daily     Recurrent pleural effusions  -CT shows signs of right sided volume overload  -Left > right effusions, history of multiple thoracenteses  -No signs of respiratory distress, saturating 100% on RA  Continue home torsemide 20 mg daily  -Add PRN dose if symptomatic or worsening oxygenation  -Monitor weight, I/O, renal function  -Reassess for need of thoracentesis if worsening    A-fib   -Hx of prior atrial fibrillation  -Home regimen Not on AC due to history of GI bleed  -Hold metoprolol in setting of hypotension  -SCDs while inpt  -Follows with Dr. Roshan Jeffrey at [facility]    Hypothyroidism  -TSH .87  -Home medication: synthroid 100mcg   -Continue home medication     BPH  -Restart home finasteride and tamsulosin  -Daily bladder scans to ensure no urinary retention

## 2025-05-25 NOTE — PROGRESS NOTES
"    Hospital Medicine     Daily Progress Note                SUBJECTIVE   Interval History: Pt is alert but confused this morning, asking me to remove a tray that is not there but is oriented to self, hospital, year, though states it's December. Able to be redirected to [place], Memorial Day weekend. No fever, chills, pain.    Left a message for pt's son, Marko.     OBJECTIVE      Vital signs in last 24 hours:  Temp:  [36.3 °C (97.3 °F)-36.8 °C (98.3 °F)] 36.3 °C (97.3 °F)  Heart Rate:  [57-74] 70  Resp:  [13-26] 18  BP: ()/(50-60) 104/52    Intake/Output Summary (Last 24 hours) at 5/25/2025 0841  Last data filed at 5/25/2025 0445  Gross per 24 hour   Intake --   Output 725 ml   Net -725 ml         PHYSICAL EXAMINATION      Physical Exam    Gen: Frail but nontoxic-appearing male lying semi-recumbent in bed, NAD  Vitals: 97.3, 70, 18, 104/52, 95% on RA  HEENT: NCAT, PERRL, EOMI, + arcus senilis, mmm  CV: irregularly irregular, normal rate, +S1, +S2, iii/vi systolic murmur, JVP theresa 7 cm  Pulm: reduced breath sounds L > R bases  Abd: soft, NT, ND, +bs  Ext: wwp, no c/c/e  Neuro: alert, oriented to self, \"hospital\" but could not recall [facility] on his own, picked it out of multiple choice, \"2025\" but December, able to be redirected to May, nonfocal neuro exam  : no perry, no CVA tenderness  Psych: pleasant, cooperative     LINES, CATHETERS, DRAINS, AIRWAYS, AND WOUNDS   Lines, Drains, and Airways:  Wounds (agree with documentation and present on admission):  Peripheral IV (Adult) 05/24/25 Left Antecubital (Active)   Number of days: 1       Wound Excoriation Penis (Active)   Number of days: 1         Comments:    LABS / IMAGING / TELE      Labs  P from this moring      Imaging  No new imaging    ECG/Telemetry  Afib rate controlled    ASSESSMENT AND PLAN        Assessment & Plan  Acute cystitis without hematuria  -UA w/ pyuria, LE positive, no nitrites; dysuria , acute AMS  -Hx of ESBL but previously susceptible to " Zosyn  -Continue zosyn  -Monitor for clinical response, WBC trends  -Adjust antibiotics if cultures indicate resistance  -Monitor for urosepsis, although afebrile and lactate normal    CALI on CKD  -Cr 1.9/BUN 29, baseline about 1.5   -Likely pre-renal azotemia in setting of acute infection/dehydration  -Bladder scan reveals 160cc  -Trend CMP daily, strict I&Os  -Avoid nephrotoxic medications, meds renally dosed  - hold torsemide and trend    Chronic Macrocytic anemia  Chronic Thrombocytopenia  -Hgb 8.8 on presentation, Baseline Hb ~8-9  -.4, plts 91  -Follow up B12, folate  -Monitor CBC  -No signs of overt GIB    CAD  HFeEF  HTN  -History of CABG in 1996  -No recent stress test or cath available on file  -Previously on plavix and aspirin; Plavix had been discontinued over the last several months due to recurrent GI bleeds.  -Continue aspirin 81 mg daily  -Hypotensive on admission-hold home torsemide 20 mg daily     Recurrent pleural effusions  -CT shows signs of right sided volume overload  -Left > right effusions, history of multiple thoracenteses  -No signs of respiratory distress, saturating 100% on RA  Continue home torsemide 20 mg daily  -Add PRN dose if symptomatic or worsening oxygenation  -Monitor weight, I/O, renal function  -Reassess for need of thoracentesis if worsening, no acute indication given asymptomatic and has had thoracenteses prior; prior transudative, due to CHF/torrential TR    A-fib   -Hx of prior atrial fibrillation  -Home regimen Not on AC due to history of GI bleed  -Hold metoprolol in setting of hypotension  -SCDs while inpt  -Follows with [facility] Roshan Jeffrey at [facility]    Hypothyroidism  -TSH .87  -Home medication: synthroid 100mcg   -Continue home medication     BPH  -Resumed home finasteride and tamsulosin  -Daily bladder scans to ensure no urinary retention    Thrombocytopenia (CMS/HCC)  - chronic, stable. With macrocytic anemia and thrombocytopenia, there is c/f  underlying MDS. Given chronicity and stability, outpatient hematology f/u is likely appropriate, and depends on goals of care.  Toxic metabolic encephalopathy  - suspect due to UTI, but given Afib not on AC and also thrombocytopenia will obtain CT head to r/o intracranial pathology, this is likely relative low yield.    VTE Assessment: Padua    VTE Prophylaxis:  Current anticoagulants:    None      Code Status: DNR (A.N.D.)      Estimated Discharge Date: 5/27/2025   Disposition Planning: P urine cultures, suspect 48 hours          Desire Zavala MD  5/25/2025

## 2025-05-25 NOTE — CONSULTS
"Palliative Care Consult      This is Hospital Day: 2    Conversation/Goals of Care:  Patient interactive but confused about hospitalization, said he's here with a \"heart attack that went right to left instead of left to right\" but reports he is feeling better and hoping to go home soon.    Patient well known to palliative team both inpatient and outpatient; historically goals life prolonging with limit of DNR/DNI.    Assessment/Plan  Assessment & Plan  Debility  - Debility likely multifactorial in the setting of HFrEF, TR, afib, recurrent pleural effusion, recurrent GIB.    - Living situation prior to hospitalization home with caregiver.   - Baseline functional status is wheelchair for ambulation.   - Current Palliative Performance Status: 40%  - Baseline Palliative Performance Status: 40%   - Patient remains high risk for further deterioration and functional decline.    Plan:  - continue current plan of care  - Palliative care will continue to follow for complex medical decision making  - If any decline, recommend family meeting with his sons.  Palliative care by specialist  89 y.o. with a PMH of HFrEF, TR, afib, recurrent pleural effusion, recurrent GIB, recurrent UTI presenting with AMS    - Code status: Do Not Resuscitate / Allow Natural Death  - Prognosis: TBD  - Advance Directives: yes  - Surrogate Decision Maker: son Marko is healthcare agent   - Capacity unreliable   - Spiritual & Existential Needs: none identified         Reason for Consult:  Assistance with clarification of goals of care    HPI      Source: chart review and the patient    Samy Elena is an 89 y.o. male PMH as below who presented with AMS. He is being treated for UTI.     He reports he came in for \"a heart attack that went the wrong way\" but says he feels fine now and is asking if he is going home today. Asks me to call his family and let them know when it is time to pick him up.      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)     Coronary artery disease     Disease of thyroid gland     Will catheter in place     6/25 not at present    GI (gastrointestinal bleed)     Hypertension     Myocardial infarction (CMS/HCC)     Orthostatic hypotension     TIA (transient ischemic attack)        Past Surgical History  Past Surgical History   Procedure Laterality Date    cataract extraction right eye with implant and limbal relaxing incision Right 7/18/2024    Performed by Hayder Moses MD at  OR [facility]    Cataract extraction w/ intraocular lens implant Left     Cataract extraction with LRI and anterior vitrectomy left eye Left 11/16/2023    Performed by Hayder Moses MD at  OR [facility]    Cholecystectomy      Colostomy      COLOSTOMY LAPAROSCOPIC N/A 8/3/2022    Performed by Mat Bryant MD at [facility] OR    Coronary artery bypass graft      DIAGNOSTIC FLEXIBLE SIGMOIDOSCOPY WITH COLLECTION OF SPECIMEN BY WASHING N/A 11/27/2024    Performed by David Holcomb MD at [facility] GI    DIAGNOSTIC FLEXIBLE SIGMOIDOSCOPY WITH COLLECTION OF SPECIMEN BY WASHING N/A 10/18/2024    Performed by Maine Lopez MD at [facility]    DIAGNOSTIC UPPER GASTROINTESTINAL ENDOSCOPY WITH COLLECTION OF SPECIMEN BY WASHING N/A 11/27/2024    Performed by David Holcomb MD at [facility]    FLEXIBLE SIGMOIDOSCOPY WITH BIOPSY N/A 10/23/2024    Performed by David Holcomb MD at [facility]    INCISION AND DRAINAGE WITH DEBRIDMENT OF BUTTOCK, AND PERINEUM N/A 8/2/2022    Performed by Mat Bryant MD at [facility] OR    Joint replacement Left     Knee    RIGHT HEART CATH N/A 8/4/2022    Performed by Cristal Lacey MD at [facility] CARDIAC CATH/EP    Thyroidectomy      WASHOUT OF PERINEAL WOUND,  COLONIC LAVAGE N/A 8/3/2022    Performed by Mat Bryant MD at [facility] OR       Lutheran:  Methodist  Orthodoxy / Spiritual:   no spiritual concerns identified    Review of Systems:  All other systems reviewed and negative except as noted in the HPI.    [facility]  [facility] (Outside records) query reviewed with no concerns.    Current Medications:  Current Facility-Administered Medications   Medication Dose Route Frequency Provider Last Rate Last Admin    aspirin enteric coated tablet 81 mg  81 mg oral Daily Rick Guerrero MD        buPROPion XL (WELLBUTRIN XL) 24 hr ER tablet 150 mg  150 mg oral Daily Rick Guerrero MD        calcium (as carbonate) (TUMS) chewable tablet 200 mg of elemental calcium  200 mg of elemental calcium oral Daily PRN Rick Guerrero MD        carboxymethylcellulose (REFRESH PLUS) 0.5 % ophthalmic dropperette 1 drop  1 drop Both Eyes q2h while awake Rick Guerrero MD        clonazePAM (klonoPIN) tablet 0.5 mg  0.5 mg oral Nightly PRN Rick Guerrero MD        cyanocobalamin (VITAMIN B12) tablet 500 mcg  500 mcg oral q AM Rick Guerrero MD        ferrous sulfate tablet 325 mg  325 mg oral Daily with breakfast Rick Guerrero MD        finasteride (PROSCAR) tablet 5 mg  5 mg oral Daily Rick Guerrero MD        latanoprost (XALATAN) 0.005 % ophthalmic solution 1 drop  1 drop Both Eyes Nightly Rick Guerrero MD        levothyroxine (SYNTHROID) tablet 100 mcg  100 mcg oral Daily before breakfast Rick Guerrero MD        melatonin capsule 10 mg  10 mg oral Nightly Rick Guerrero MD        pantoprazole (PROTONIX) tablet,delayed release (DR/EC) 20 mg  20 mg oral Daily Rick Guerrero MD        piperacillin-tazobactam (ZOSYN) 4.5 g/100 mL IVPB in NSS  4.5 g intravenous q6h INT Rick Guerrero MD        tamsulosin (FLOMAX) 24 hr ER capsule 0.4 mg  0.4 mg oral q AM Rick Guerrero MD        thiamine (VITAMIN B1) tablet  100 mg  100 mg oral q AM Rick Guerrero MD        torsemide (DEMADEX) tablet 20 mg  20 mg oral BID Rick Guerrero MD           Allergies:  Allergies   Allergen Reactions    Jardiance [Empagliflozin] Other (see comments)     denise's gangrene         Objective    Physical Exam:   Physical Exam  Vitals reviewed.   Constitutional:       General: He is not in acute distress.     Appearance: He is ill-appearing.   HENT:      Head: Normocephalic.      Mouth/Throat:      Mouth: Mucous membranes are dry.   Eyes:      General: No scleral icterus.  Cardiovascular:      Rate and Rhythm: Normal rate.   Pulmonary:      Effort: No respiratory distress.   Abdominal:      General: There is no distension.   Musculoskeletal:         General: No deformity.   Skin:     General: Skin is dry.      Coloration: Skin is pale.   Neurological:      General: No focal deficit present.      Mental Status: He is alert.      Motor: Weakness present.      Comments: Oriented to self, hospital, not situation   Psychiatric:         Mood and Affect: Mood normal.         Behavior: Behavior normal.         Vitals:  Vitals:    05/25/25 0347   BP:    Pulse: 70   Resp:    Temp:    SpO2:        Laboratory Studies:    I have reviewed the patient's pertinent labs to the time of note.  Significant abnormals are Cr 1.9, albumin low, anemia, thrombocytopenia.    Imaging and Other Studies:     I have independently reviewed the pertinent imaging to the time of note and agree with reported results. Significant findings include: CT C/A/P with bilateral pleural effusions    I have independently reviewed the pertinent cardiac studies to the time of note and agree with reported results. Significant findings include: QTc 528      Shital Mi MD  Office [phone]

## 2025-05-25 NOTE — ASSESSMENT & PLAN NOTE
89 y.o. with a PMH of HFrEF, TR, afib, recurrent pleural effusion, recurrent GIB, recurrent UTI presenting with AMS    - Code status: Do Not Resuscitate / Allow Natural Death  - Prognosis: TBD  - Advance Directives: yes  - Surrogate Decision Maker: kaykay Zhu is healthcare agent   - Capacity unreliable   - Spiritual & Existential Needs: none identified

## 2025-05-25 NOTE — ASSESSMENT & PLAN NOTE
-UA w/ pyuria, LE positive, no nitrites; dysuria , acute AMS  -Hx of ESBL but previously susceptible to Zosyn  -Continue zosyn  -Monitor for clinical response, WBC trends  -Adjust antibiotics if cultures indicate resistance  -Monitor for urosepsis, although afebrile and lactate normal    CALI on CKD  -Cr 1.9/BUN 29, baseline about 1.5   -Likely pre-renal azotemia in setting of acute infection/dehydration  -Bladder scan reveals 160cc  -Trend CMP daily, strict I&Os  -Avoid nephrotoxic medications, meds renally dosed  - hold torsemide and trend    Chronic Macrocytic anemia  Chronic Thrombocytopenia  -Hgb 8.8 on presentation, Baseline Hb ~8-9  -.4, plts 91  -Follow up B12, folate  -Monitor CBC  -No signs of overt GIB    CAD  HFeEF  HTN  -History of CABG in 1996  -No recent stress test or cath available on file  -Previously on plavix and aspirin; Plavix had been discontinued over the last several months due to recurrent GI bleeds.  -Continue aspirin 81 mg daily  -Hypotensive on admission-hold home torsemide 20 mg daily     Recurrent pleural effusions  -CT shows signs of right sided volume overload  -Left > right effusions, history of multiple thoracenteses  -No signs of respiratory distress, saturating 100% on RA  Continue home torsemide 20 mg daily  -Add PRN dose if symptomatic or worsening oxygenation  -Monitor weight, I/O, renal function  -Reassess for need of thoracentesis if worsening, no acute indication given asymptomatic and has had thoracenteses prior; prior transudative, due to CHF/torrential TR    A-fib   -Hx of prior atrial fibrillation  -Home regimen Not on AC due to history of GI bleed  -Hold metoprolol in setting of hypotension  -SCDs while inpt  -Follows with Dr. Roshan Jeffrey at [facility]    Hypothyroidism  -TSH .87  -Home medication: synthroid 100mcg   -Continue home medication     BPH  -Resumed home finasteride and tamsulosin  -Daily bladder scans to ensure no urinary retention

## 2025-05-25 NOTE — PLAN OF CARE
Problem: Adult Inpatient Plan of Care  Goal: Plan of Care Review  Outcome: Progressing  Flowsheets (Taken 5/25/2025 0514)  Progress: improving  Outcome Evaluation: pt admited to floor from ER, pt oriented x 2, pt denies pain. pt requested soup. pt bladder scan tonight, fluid bolus completed,and IV ABX continued. pt voided via urinal. bed alarm activated. call bell within reach. will continue to monitor  Plan of Care Reviewed With: patient  Goal: Patient-Specific Goal (Individualized)  Outcome: Progressing  Goal: Absence of Hospital-Acquired Illness or Injury  Outcome: Progressing  Goal: Optimal Comfort and Wellbeing  Outcome: Progressing  Goal: Readiness for Transition of Care  Outcome: Progressing     Problem: Infection  Goal: Absence of Infection Signs and Symptoms  Outcome: Progressing   Plan of Care Review  Plan of Care Reviewed With: patient  Progress: improving  Outcome Evaluation: pt admited to floor from ER, pt oriented x 2, pt denies pain. pt requested soup. pt bladder scan tonight, fluid bolus completed,and IV ABX continued. pt voided via urinal. bed alarm activated. call bell within reach. will continue to monitor

## 2025-05-25 NOTE — H&P
"            Hospital Medicine    History & Physical        CHIEF COMPLAINT   AMS, dysuria     HISTORY OF PRESENT ILLNESS      Samy Elena is a 89 y.o. 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  His caregiver reports that he has been hallucinating, talking to people who are not present, and \"not acting like himself\", which is new and abnormal for him. She also notes dark urine and is concerned about a UTI. On evaluation, he is A&O x3, vitals stable. Urinalysis reveals gross pyuria and positive LE, urine culture is pending. The patient also reports dysuria and has mild RLQ and suprapubic tenderness. Given the history of recurrent left pleural effusions, a CT chest/abd/pelvis was performed, which showed bilateral pleural effusions (L > R), left basilar consolidation, and cardiomegaly with IVC dilation, suggestive right sided HF  Labs are notable for creatinine 1.9 (baseline 1.5), Hgb 8.8 (chronic), platelets 91K, macrocytosis, and stable lactate. Blood and urine cultures were sent. Zosyn was started given history of ESBL UTI (previously susceptible to pip tazo).  Patient's son at bedside, confirms DNR status and that patient is not at baseline cognitively.      Per outpt cardiology note 5/20/25- pts BP normally runs low- 95/52 at that time      Medications   clonazePAM (klonoPIN) tablet 0.5 mg (0.5 mg oral Given 5/24/25 1732)   iopamidoL (ISOVUE-370) 370 mg iodine /mL (76 %) injection 100 mL (100 mL intravenous Given 5/24/25 1839)   piperacillin-tazobactam (ZOSYN) 4.5 g/100 mL IVPB in NSS (0 g intravenous Stopped 5/24/25 2016)   lactated ringer's bolus 500 mL (0 mL intravenous Stopped " 5/24/25 2114)     SBP , DBP 50-60, HR 61-74  Wbc 3.81, hgb 8.8 (8.2 4/4) mcv 108, plts 91  UA RBC, wbc/bacteria/+LE, negative nitrites  Urine and blood cultures in process  Cr 1.9 (1.5 4/4/25) baseline about 1.5 , bun 29  Lactate 1.6    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)     Coronary artery disease     Disease of thyroid gland     Will catheter in place     6/25 not at present    GI (gastrointestinal bleed)     Hypertension     Myocardial infarction (CMS/HCC)     Orthostatic hypotension     TIA (transient ischemic attack)        Past Surgical History   Procedure Laterality Date    cataract extraction right eye with implant and limbal relaxing incision Right 7/18/2024    Performed by Hayder Moses MD at  OR [facility]    Cataract extraction w/ intraocular lens implant Left     Cataract extraction with LRI and anterior vitrectomy left eye Left 11/16/2023    Performed by Hayder Moses MD at  OR [facility]    Cholecystectomy      Colostomy      COLOSTOMY LAPAROSCOPIC N/A 8/3/2022    Performed by Mat Bryant MD at [facility] [place]    Coronary artery bypass graft      DIAGNOSTIC FLEXIBLE SIGMOIDOSCOPY WITH COLLECTION OF SPECIMEN BY WASHING N/A 11/27/2024    Performed by David Holcomb MD at [facility] [place] [facility]    DIAGNOSTIC FLEXIBLE SIGMOIDOSCOPY WITH COLLECTION OF SPECIMEN BY WASHING N/A 10/18/2024    Performed by Maine Lopez MD at [facility] [place] [facility]    DIAGNOSTIC UPPER GASTROINTESTINAL ENDOSCOPY WITH COLLECTION OF SPECIMEN BY WASHING N/A 11/27/2024    Performed by David Holcomb MD at [facility]    FLEXIBLE SIGMOIDOSCOPY WITH BIOPSY N/A 10/23/2024    Performed by David Holcomb MD at [facility] [place] [facility]    INCISION AND DRAINAGE WITH DEBRIDMENT OF BUTTOCK, AND PERINEUM N/A 8/2/2022    Performed by Mat Bryant MD at [facility] [place]     Joint replacement Left     Knee    RIGHT HEART CATH N/A 8/4/2022    Performed by Cristal Lacey MD at [facility] CARDIAC CATH/EP    Thyroidectomy      WASHOUT OF PERINEAL WOUND, COLONIC LAVAGE N/A 8/3/2022    Performed by Mat Bryant MD at [facility] OR       PCP: Zachery Hernandez MD    MEDICATIONS      Prior to Admission medications    Medication Sig Start Date End Date Taking? Authorizing Provider   aspirin 81 mg enteric coated tablet Take 81 mg by mouth daily. 9 am   Yes Clay Chambers MD   calcium, as carbonate, (TUMS) 200 mg calcium (500 mg) chewable tablet Take 1 tablet by mouth daily. 11 am   Yes Clay Chambers MD   clonazePAM (KlonoPIN) 0.5 mg tablet Take 1 tablet (0.5 mg total) by mouth nightly as needed for anxiety. 5/22/25 6/21/25 Yes Tamera Rojo CRNP   cranberry extract 425 mg capsule Take 425 mg by mouth daily. 5 pm   Yes Caly Chambers MD   ferrous sulfate 325 mg (65 mg iron) tablet Take 65 mg by mouth daily with breakfast. 11 am   Yes Clay Chambers MD   finasteride (PROSCAR) 5 mg tablet Take 5 mg by mouth daily. 9 am   Yes Clay Chambers MD   levothyroxine (SYNTHROID) 100 mcg tablet Take 100 mcg by mouth daily before breakfast. 7 am   Yes Clay Chambers MD   methenamine (HIPREX) 1 gram tablet Take 1 g by mouth daily before lunch. 11 am   Yes Clay Chambers MD   metoprolol succinate XL (TOPROL-XL) 25 mg 24 hr tablet Take 12.5 mg by mouth daily.   Yes Clay Chambers MD   omeprazole (PriLOSEC) 20 mg capsule Take 20 mg by mouth daily. 11 am   Yes Clay Chambers MD   potassium chloride 20 mEq packet Take 10 mEq by mouth 2 (two) times a day. 10 meq bid   Yes Clay Chambers MD   tamsulosin (FLOMAX) 0.4 mg capsule Take 0.4 mg by mouth every morning. 9 am   Yes Clay Chambers MD   thiamine HCl (VITAMIN B1) 100 mg tablet Take 100 mg by mouth every morning. 9 am   Yes Clay Chambers  MD   torsemide (DEMADEX) 10 mg tablet Take 20 mg by mouth 2 (two) times a day. 9 am   Yes ProviderClay MD   bimatoprost (LUMIGAN) 0.01 % ophthalmic drops Administer 1 drop into the left eye nightly.    ProviderClay MD   buPROPion XL (WELLBUTRIN XL) 150 mg 24 hr tablet Take 150 mg by mouth daily.  Patient not taking: Reported on 4/22/2025 2/14/25   ProviderClay MD   carboxymethylcellulose (REFRESH PLUS) 0.5 % dropperette Administer 1 drop into both eyes every2 (two) hours while awake.    Clay Chambers MD   cyanocobalamin (VITAMIN B12) 500 mcg tablet Take 500 mcg by mouth every morning. 9 am    Clay Chambers MD   melatonin 10 mg capsule Take 10 mg by mouth nightly. 9 pm    ProviderClay MD   amLODIPine (NORVASC) 2.5 mg tablet amlodipine 2.5 mg tablet  7/22/22  John Chambers MD   apixaban (ELIQUIS) 2.5 mg tablet   7/22/22  John Chambers MD   hydrochlorothiazide (MICROZIDE) 12.5 mg capsule hydrochlorothiazide 12.5 mg capsule  7/22/22  John Chambers MD       ALLERGIES      Jardiance [empagliflozin]    FAMILY HISTORY      No family history on file.    SOCIAL HISTORY      Social History     Substance and Sexual Activity   Drug Use Never     Tobacco Use: Low Risk  (12/30/2024)    Patient History     Smoking Tobacco Use: Never     Smokeless Tobacco Use: Never     Passive Exposure: Not on file     Alcohol Use: Not At Risk (4/2/2025)    AUDIT-C     Frequency of Alcohol Consumption: Monthly or less     Average Number of Drinks: 1 or 2     Frequency of Binge Drinking: Never     Within the past week have you used heroin or used opioid medications other than as prescribed? (OxyContin, Fentanyl, Subutex): No  Do you get sick if you cannot use heroin, methadone, or opioid medications?: No         REVIEW OF SYSTEMS      All other systems reviewed and negative except as noted in HPI    PHYSICAL EXAMINATION      Temp:  [36.4 °C (97.5  °F)-36.8 °C (98.3 °F)] 36.4 °C (97.5 °F)  Heart Rate:  [61-74] 65  Resp:  [13-26] 20  BP: ()/(50-60) 94/55  Body mass index is 21.37 kg/m².    Physical Exam  Constitutional:       Appearance: Normal appearance.   HENT:      Head: Normocephalic and atraumatic.      Mouth/Throat:      Mouth: Mucous membranes are dry.   Eyes:      Extraocular Movements: Extraocular movements intact.      Pupils: Pupils are equal, round, and reactive to light.   Cardiovascular:      Rate and Rhythm: Rhythm irregular.      Pulses: Normal pulses.      Heart sounds: Normal heart sounds.   Pulmonary:      Effort: Pulmonary effort is normal.      Breath sounds: Normal breath sounds.   Abdominal:      General: There is no distension.      Palpations: Abdomen is soft.      Tenderness: There is no abdominal tenderness.   Musculoskeletal:         General: No swelling.   Skin:     General: Skin is dry.   Neurological:      General: No focal deficit present.      Mental Status: He is alert and oriented to person, place, and time.   Psychiatric:         Behavior: Behavior normal.         LABS / IMAGING / EKG        Labs  Wbc 3.81, hgb 8.8 (8.2 4/4) mcv 108, plts 91 (104 4/4)  UA RBC, wbc/bacteria/+LE, negative nitrites  Urine and blood cultures in process  Cr 1.9 (1.5 4/4/25) baseline about 1.5 , bun 29  Lactate 1.6    Imaging  CXR:  Again seen is cardiomegaly and median sternotomy wires.  There is a loculated  left-sided pleural effusion with associated airspace disease in the left mid and  lower lung fields.  This is not significantly changed from the most recent prior  examination.  The right lung appears clear.  There is no pneumothorax.     --  IMPRESSION:     No interval change in appearance of the chest.  There is cardiomegaly and a  large pleural parenchymal process in the left lung.    CT chest  MPRESSION:  1.  Bilateral pleural effusions, left greater than right with associated  consolidation.     2.  Cardiomegaly with enlarged  inferior vena cava and findings suggestive of  right-sided heart failure.     3. 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       ECG/Telemetry  Atrial fibrillation   Right bundle branch block   Abnormal ECG   ASSESSMENT AND PLAN              Assessment & Plan  Acute cystitis without hematuria  -UA w/ pyuria, LE positive, no nitrites; dysuria , acute AMS  -Hx of ESBL but previously susceptible to Zosyn  -Continue zosyn  -Monitor for clinical response, WBC trends  -Adjust antibiotics if cultures indicate resistance  -Monitor for urosepsis, although afebrile and lactate normal    CALI on CKD  -Cr 1.9/BUN 29, baseline about 1.5   -Likely pre-renal azotemia in setting of acute infection/dehydration  -Bladder scan reveals 160cc  -Trend CMP daily, strict I&Os  -Avoid nephrotoxic medications, meds renally dosed    Chronic Macrocytic anemia  Chronic Thrombocytopenia  -Hgb 8.8 on presentation, Baseline Hb ~8-9  -.4, plts 91  -Follow up B12, folate  -Monitor CBC  -No signs of overt GIB    CAD  HFeEF  HTN  -History of CABG in 1996  -No recent stress test or cath available on file  -Previously on plavix and aspirin; Plavix had been discontinued over the last several months due to recurrent GI bleeds.  -Continue aspirin 81 mg daily  -Hypotensive on admission-hold home torsemide 20 mg daily     Recurrent pleural effusions  -CT shows signs of right sided volume overload  -Left > right effusions, history of multiple thoracenteses  -No signs of respiratory distress, saturating 100% on RA  Continue home torsemide 20 mg daily  -Add PRN dose if symptomatic or worsening oxygenation  -Monitor weight, I/O, renal function  -Reassess for need of thoracentesis if worsening    A-fib   -Hx of prior atrial fibrillation  -Home regimen Not on AC due to history of GI bleed  -Hold metoprolol in setting of hypotension  -SCDs while inpt  -Follows with Dr. Roshan Jeffrey at [facility]    Hypothyroidism  -TSH  .87  -Home medication: synthroid 100mcg   -Continue home medication     BPH  -Restart home finasteride and tamsulosin  -Daily bladder scans to ensure no urinary retention    VTE Assessment: Padua    VTE Prophylaxis: SCDs  Code Status: DNR (A.N.D.)      Estimated Discharge Date: 5/27/2025     Rick Guerrero MD  5/24/2025

## 2025-05-25 NOTE — PROGRESS NOTES
Spoke with pt's son Marko to provide medical update.    Marko is concerned that the CT can shows fluid accumulation - I discussed that I am concerned with the rapidity of reaccumulation given he just had a L thoracentesis with Dr. Berry of IR for 750 cc on 5/19. Pt., however, is asymptomatic. Discussed with Marko that we can reach out to Dr. Berry on Tuesday to see if he can do a repeat thoracentesis.

## 2025-05-26 NOTE — CONSULTS
Infectious Disease Consult Note    Patient Name: Samy Elena  MR#: [id number]  : 10/6/1930  Admission Date: 2025  Consult Date: 25 10:44 AM   Consultant: Aretha Altman MD    Reason for Consult: cystitis eval, prior history MDRO, now with mixed growth of unclear significant on urine culture, asked micro lab speciation  Referring Provider: Desire Zavala MD      History of Present Illness     Samy Elena is a 89 y.o. male who was admitted on 2025.    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  Has a history of UTIs.  History of ESBL UTI.  Has a history of bladder outlet obstruction with an enlarged prostate.  Has been on methenamine.    Presented to the emergency department at [facility] on 2025 with confusion.  His creatinine was elevated at 1.9.    Urinalysis with too numerous to count white cells, bacteria, trace protein, cloudy, leukocyte esterase.    Blood cultures in progress  Urine culture in progress preliminary with gram-negative rods.  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  Diffuse bladder wall thickening.  No obvious hydronephrosis.            Allergies:   Allergies   Allergen Reactions    Jardiance [Empagliflozin] Other (see comments)     denise's gangrene       Medical History:  has a past medical history of Aneurysm of internal iliac artery (CMS/HCC), Atrial fibrillation (CMS/HCC), CHF (congestive heart failure) (CMS/[facility]), 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[facility]), Coronary artery disease, Disease of thyroid gland, Will catheter in place, GI (gastrointestinal bleed),  Hypertension, Myocardial infarction (CMS/HCC), Orthostatic hypotension, and TIA (transient ischemic attack).    Surgical History:   Past Surgical History   Procedure Laterality Date    cataract extraction right eye with implant and limbal relaxing incision Right 7/18/2024    Performed by Hayder Moses MD at  OR [facility]    Cataract extraction w/ intraocular lens implant Left     Cataract extraction with LRI and anterior vitrectomy left eye Left 11/16/2023    Performed by Hayder Moses MD at  OR [facility]    Cholecystectomy      Colostomy      COLOSTOMY LAPAROSCOPIC N/A 8/3/2022    Performed by Mat Bryant MD at [facility]    Coronary artery bypass graft      DIAGNOSTIC FLEXIBLE SIGMOIDOSCOPY WITH COLLECTION OF SPECIMEN BY WASHING N/A 11/27/2024    Performed by David Holcomb MD at [facility]    DIAGNOSTIC FLEXIBLE SIGMOIDOSCOPY WITH COLLECTION OF SPECIMEN BY WASHING N/A 10/18/2024    Performed by Maine Lopez MD at [facility]    DIAGNOSTIC UPPER GASTROINTESTINAL ENDOSCOPY WITH COLLECTION OF SPECIMEN BY WASHING N/A 11/27/2024    Performed by David Holcomb MD at [facility]    FLEXIBLE SIGMOIDOSCOPY WITH BIOPSY N/A 10/23/2024    Performed by David Holcomb MD at [facility]    INCISION AND DRAINAGE WITH DEBRIDMENT OF BUTTOCK, AND PERINEUM N/A 8/2/2022    Performed by Mat Bryant MD at [facility]    Joint replacement Left     Knee    RIGHT HEART CATH N/A 8/4/2022    Performed by Cristal Lacey MD at [facility] CARDIAC CATH/EP    Thyroidectomy      WASHOUT OF PERINEAL WOUND, COLONIC LAVAGE N/A 8/3/2022    Performed by Mat Bryant MD at [facility]         Social History:   Social History     Tobacco Use    Smoking status: Never    Smokeless tobacco: Never   Vaping Use    Vaping status: Never Used   Substance Use Topics    Alcohol use: Not Currently     Comment: social    Drug use: Never              Family History: family history is not on file.    Family history reviewed and non-contributory to current ID  "issues.    Review of Systems:    All other systems reviewed an negative except for what is noted in HPI.     Medications:       Current Facility-Administered Medications   Medication Dose Route Frequency    aspirin  81 mg oral Daily    buPROPion XL  150 mg oral Daily    calcium (as carbonate)  200 mg of elemental calcium oral Daily PRN    carboxymethylcellulose  1 drop Both Eyes q2h while awake    clonazePAM  0.5 mg oral Nightly PRN    cyanocobalamin  500 mcg oral q AM    ferrous sulfate  325 mg oral Daily with breakfast    finasteride  5 mg oral Daily    latanoprost  1 drop Both Eyes Nightly    levothyroxine  100 mcg oral Daily before breakfast    melatonin  10 mg oral Nightly    metoprolol succinate XL  12.5 mg oral Daily    pantoprazole  20 mg oral Daily    piperacillin-tazobactam  4.5 g intravenous q6h INT    tamsulosin  0.4 mg oral q AM    thiamine HCl  100 mg oral q AM    [Provider Managed Hold] torsemide  20 mg oral BID           Objective     Vital Signs:    Visit Vitals  BP 95/60 (BP Location: Right upper arm, Patient Position: Lying)   Pulse 75   Temp 36.9 °C (98.4 °F) (Oral)   Resp 18   Ht 1.854 m (6' 1\")   Wt 73.5 kg (162 lb)   SpO2 95%   BMI 21.37 kg/m²     Temp (72hrs), Av.6 °C (97.8 °F), Min:36.3 °C (97.3 °F), Max:36.9 °C (98.4 °F)      Physical exam:     He is currently awake.  Alert.  Answers questions  Conversant  Not hallucinating  He wants to go home  Pupils are reactive  He appears elderly and fragile  Moves his extremities on commands.  Knows where he is  No meningeal signs  Midline trachea  Lungs are clear bilaterally decreased breath sounds in both bases.  No shortness of breath.  Heart S1-S2 no gallop  Abdomen bowel sounds are present, soft, nontender, no hepatosplenomegaly no mass no rebound no guarding  No suprapubic tenderness  No CVA tenderness  No spine tenderness  No redness or swelling of the joints  No cellulitis    Lines, Drains, Airways, Wounds:  Peripheral IV (Adult) 25 " Left Antecubital (Active)   Number of days: 2       Wound Excoriation Penis (Active)   Number of days: 2       Labs:  CBC Results         05/26/25 05/25/25 05/24/25     0441 1200 1610    WBC 4.50 5.04 3.81    RBC 2.45 2.55 2.61    HGB 8.3 8.5 8.8    HCT 26.9 27.6 28.3    .8 108.2 108.4    MCH 33.9 33.3 33.7    MCHC 30.9 30.8 31.1    PLT 82 85 91           Comment for PLT at 1610 on 05/24/25: ALL RESULTS HAVE BEEN RECHECKEDSPECIMEN QUALITY CHECKEDRESULTS OBTAINED AFTER VORTEXING TO ELIMINATE PLT CLUMPS            CMP Results         05/26/25 05/25/25 05/24/25     0441 1200 1610     142 143    K 3.8 3.9 3.9    Cl 110 111 111    CO2 22 23 25    Glucose 87 149 151    BUN 30 28 29    Creatinine 1.9 1.6 1.9    Calcium 7.9 8.1 8.4    Anion Gap 10 8 7    AST 24 -- 30    ALT 4 -- 6    Albumin 2.7 -- 3.1    EGFR 32.5 39.9 32.5           Comment for K at 1610 on 05/24/25: Results obtained on plasma. Plasma Potassium values may be up to 0.4 mEQ/L less than serum values. The differences may be greater for patients with high platelet or white cell counts.    Comment for EGFR at 0441 on 05/26/25: Calculation based on the Chronic Kidney Disease Epidemiology Collaboration (CKD-EPI) equation refit without adjustment for race.    Comment for EGFR at 1200 on 05/25/25: Calculation based on the Chronic Kidney Disease Epidemiology Collaboration (CKD-EPI) equation refit without adjustment for race.    Comment for EGFR at 1610 on 05/24/25: Calculation based on the Chronic Kidney Disease Epidemiology Collaboration (CKD-EPI) equation refit without adjustment for race.                   Microbiology Results       Procedure Component Value Units Date/Time    Blood Culture Blood, Venous [[id number]]  (Normal) Collected: 05/24/25 1942    Specimen: Blood, Venous Updated: 05/25/25 2100     Culture No growth at 18-24 hours    Blood Culture Blood, Venous [[id number]]  (Normal) Collected: 05/24/25 1942    Specimen: Blood, Venous Updated:  05/25/25 2100     Culture No growth at 18-24 hours    Urine culture Urine, Clean Catch [[id number]]  (Abnormal) Collected: 05/24/25 1620    Specimen: Urine, Clean Catch Updated: 05/26/25 0908     Urine Culture **Positive Culture**      50,000-99,000 cfu/mL Gram Negative Rods      50,000-99,000 cfu/mL Mixed Growth    SARS-COV-2 (COVID-19)/ FLU A/B, AND RSV, PCR Nasopharynx [[id number]]  (Normal) Collected: 05/24/25 1552    Specimen: Nasopharyngeal Swab from Nasopharynx Updated: 05/24/25 1741     SARS-CoV-2 (COVID-19) Negative     Influenza A Negative     Influenza B Negative     Respiratory Syncytial Virus Negative    Narrative:      Testing performed using real-time PCR for detection of COVID-19. EUA approved validation studies performed on site.                 Imaging:    CT CHEST/ABDOMEN/PELVIS WITH IV CONTRAST [[id number]] Collected: 05/24/25 1910   Order Status: Completed Updated: 05/24/25 1916   Narrative:     EXAM: CT CHEST/ABDOMEN/PELVIS W IV CONTRAST    CLINICAL HISTORY: Sepsis    COMPARISON: Multiple prior studies of most recent chest CT from 4/2/2025    TECHNIQUE: CT chest, abdomen and pelvis with contrast was performed with the  patient in the supine position. Images reconstructed/reformatted in the axial,  coronal and  sagittal planes. Oral contrast was not administered. CT DOSE:  One  or more dose reduction techniques (e.g. automated exposure control, adjustment  of the mA and/or kV according to patient size, use of iterative reconstruction  technique) utilized for this examination. .    ADMINISTERED CONTRAST AND DOSE: 100mL of iopamidoL (ISOVUE-370) 370 mg iodine  /mL (76 %) injection 100 mL    COMMENT:    CHEST    AIRWAYS, LUNGS AND PLEURA: There is a large left pleural effusion and a small  right pleural effusion with associated consolidation, left greater than right.  There is mild paraseptal emphysema at the lung apices.    CARDIOMEDIASTINUM: The heart is enlarged.  The right side of the heart  is  enlarged and the inferior vena cava is markedly enlarged with reflux of contrast  into the hepatic veins suggestive of right-sided heart failure.    AORTA, GREAT VESSELS: Within normal limits    LYMPH NODES: No thoracic lymphadenopathy    IMAGED THYROID: Within normal limits    IMAGED ESOPHAGUS: Within normal limits.    CHEST WALL: Within normal limits    ABDOMEN AND PELVIS    LIVER: Normal in size and configuration. No suspicious mass.    BILIARY TREE: No intrahepatic or extrahepatic bile duct dilation.    GALLBLADDER: The gallbladder is surgically absent.    PANCREAS: Within normal limits.  No main pancreatic duct dilation.    SPLEEN: Within normal limits.    ADRENAL GLANDS: Within normal limits    KIDNEYS, URETERS: No hydronephrosis. No suspicious renal mass. There are  bilateral renal cysts.    BOWEL: No disproportionate dilation of the small or large bowel. There are  diverticula in the colon. There is a left lower quadrant colostomy.    PERITONEUM/RETROPERITONEUM:  There is a small amount of ascites in the pelvis.    LYMPH NODES: No abdominal or pelvic lymphadenopathy.    REPRODUCTIVE ORGANS: The prostate gland is enlarged.    BLADDER: There is diffuse bladder wall thickening.    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    BONES: No suspicious osseous lesion. There are multilevel degenerative changes  in the spine with compression deformity of the L1 vertebral body.    SOFT TISSUES: Unremarkable.     Impression:     IMPRESSION:  1.  Bilateral pleural effusions, left greater than right with associated  consolidation.    2.  Cardiomegaly with enlarged inferior vena cava and findings suggestive of  right-sided heart failure.    3. 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         Assessment   This is a 89 y.o. male with a complex medical history that includes coronary  disease, CABG, heart failure, TR, A-fib, GI bleed, diverticulitis, recurrent left-sided pleural effusion, recurrent thoracentesis, hypothyroidism, CKD, Justin gangrene to SGLT2, recurrent GI bleed status post colostomy with rectal stump.  Has a history of UTIs.  History of ESBL UTI.  Has a history of bladder outlet obstruction with an enlarged prostate.  Has been on methenamine.  Presented to the emergency department at [facility] on [facility]/24/[facility]25 with confusion.  His creatinine was elevated at 1.9.  Urinalysis with too numerous to count white cells, bacteria, trace protein, cloudy, leukocyte esterase.    Urinary tract infection  History of ESBL and recurrent UTIs  Toxic metabolic encephalopathy  BPH, rule out urinary retention  Recurrent pleural effusions              Plan     RECOMMEND:    Follow-up with urologist, in order to try to minimize his recurrent UTIs.  Rule out urinary retention.  Pending results of cultures, in the setting of gram-negative rods in the urine culture, reveals ESBL, recommend transitioning to meropenem IV with dose adjusted for creatinine clearance.  Once cultures available, we will reevaluate the antibiotics and streamline them as needed.    Close follow-up of all cultures until completely final    Adjust the antimicrobials as needed, depending on clinical progress, culture results, and sensitivities.    While on antimicrobials, closely follow CBC with diff and CMP.  Follow creatinine clearance/eGFR.  Periodic monitoring of CRP when clinically indicated.  Monitor for diarrhea, rashes, and any other medication toxicities.  Monitor vital signs closely, including fever curve, oxygenation, and blood pressure.    DVT prophylaxis per primary team  Close monitoring of blood sugars, volume status, correction of any electrolyte abnormalities, aspiration precautions, mouth/oral care, skin care and fall precautions per primary team.         Please call ID if any questions or concerns, if  any new culture data that needs immediate attention and if any clinical deterioration.   Recommendations discussed and related to primary team.        NOTE: Some or all of the note above was created with the use of dictation software, please note this dictation software can have anomalies in its ability to transcribe. Please contact me directly for anything that seems abnormal for clarification.

## 2025-05-26 NOTE — PROGRESS NOTES
Fr. Meza administered Sacrament of the Sick to Shinto patient as well as providing the Eucharist as requested by patient. - Charted by Rev. Tres Dwyer, Spiritual Care Coordinator, [phone]

## 2025-05-26 NOTE — ASSESSMENT & PLAN NOTE
-UA w/ pyuria, LE positive, no nitrites; dysuria , acute AMS  -Hx of ESBL but previously susceptible to Zosyn  -Continue zosyn  -Monitor for clinical response, WBC wnl  -Adjust antibiotics if cultures indicate resistance  -Monitor for urosepsis, although afebrile and lactate normal  - ID consult with thanks - unfortunately this was placed for wrong team - this was corrected.  - Ucx with mixed growth - discussed with the lab - dominant organism is proteus and truly swarming the plate - they will provide resistant profile for this organism    CALI on CKD  -Cr 1.9/BUN 29, baseline about 1.5   -Likely pre-renal azotemia in setting of acute infection/dehydration  -Bladder scan reveals 160cc  -Trend CMP daily, strict I&Os  -Avoid nephrotoxic medications, meds renally dosed  - hold torsemide and trend, may be able to add back tomorrow. Renal function had improved yesterday so this was resumed but now back to 1.9; will ensure pt. Taking in normal amount of PO    Chronic Macrocytic anemia  Chronic Thrombocytopenia  -Hgb 8.8 on presentation, Baseline Hb ~8-9  -.4, plts 91  -Follow up B12, folate  -Monitor CBC  -No signs of overt GIB    CAD  HFeEF  HTN  -History of CABG in 1996  -No recent stress test or cath available on file  -Previously on plavix and aspirin; Plavix had been discontinued over the last several months due to recurrent GI bleeds.  -Continue aspirin 81 mg daily  -Hypotensive on admission-hold home torsemide 20 mg daily (was resumed yesterday with worsening renal function to 1.9 again this morning(     Recurrent pleural effusions  -CT shows signs of right sided volume overload  -Left > right effusions, history of multiple thoracenteses  -No signs of respiratory distress, saturating 100% on RA  Continue home torsemide 20 mg daily  -Add PRN dose if symptomatic or worsening oxygenation  -Monitor weight, I/O, renal function  -Family requests repeat thoracentesis by IR prior to discharge; will consult IR in the  morning for thoracentesis  - discussed with family that the rapidity of reaccumulation is concerning - appreciate palliative care input    A-fib   -Hx of prior atrial fibrillation  -Home regimen Not on AC due to history of GI bleed  -Resume metoprolol  -SCDs while inpt  -Follows with Dr. Roshan Jeffrey at [facility]    Hypothyroidism  -TSH .87  -Home medication: synthroid 100mcg   -Continue home medication     BPH  -Resumed home finasteride and tamsulosin  -Daily bladder scans to ensure no urinary retention

## 2025-05-26 NOTE — PROGRESS NOTES
"    Hospital Medicine     Daily Progress Note                SUBJECTIVE   Interval History: Pt. States he feels well this morning. Less confused this morning, no e/o hallucinations, oriented x 3 and conversant. Discussed awaiting urine cultures and plan for thora with IR tomorrow (family knows Dr. Berry and prefers IR to pulmonary). No current urinary symptoms, cannot recall if he had some prior.    Apparently some confusion overnight, calling out but redirectable.    Asking for breakfast.     OBJECTIVE      Vital signs in last 24 hours:  Temp:  [36.4 °C (97.5 °F)-36.9 °C (98.4 °F)] 36.4 °C (97.5 °F)  Heart Rate:  [71-95] 73  Resp:  [18] 18  BP: (104-108)/(57-65) 104/61  No intake or output data in the 24 hours ending 05/26/25 0756      PHYSICAL EXAMINATION      Physical Exam    Gen: Frail but nontoxic-appearing male lying semi-recumbent in bed, NAD  Vitals: 97.5, 73, 18, 104/61, 94% on RA  HEENT: NCAT, PERRL, EOMI, + arcus senilis, mmm  CV: irregularly irregular, normal rate, +S1, +S2, iii/vi systolic murmur, JVP theresa 8-9 cm  Pulm: reduced breath sounds L > R bases  Abd: soft, NT, ND, +bs  Ext: wwp, no c/c/e  Neuro: alert, oriented to self, \"hospital\" but could not recall [place] on his own, picked it out of multiple choice, \"2025\" but December, able to be redirected to May, nonfocal neuro exam  : no perry, no CVA tenderness  Psych: pleasant, cooperative     LINES, CATHETERS, DRAINS, AIRWAYS, AND WOUNDS   Lines, Drains, and Airways:  Wounds (agree with documentation and present on admission):  Peripheral IV (Adult) 05/24/25 Left Antecubital (Active)   Number of days: 2       Wound Excoriation Penis (Active)   Number of days: 2         Comments:    LABS / IMAGING / TELE      Labs  Na 142, Cr 1.9, albumin 2.7, Ucx young growth    Imaging  CT head NAD    ECG/Telemetry  Tele: rate controlled Afib    ASSESSMENT AND PLAN        Assessment & Plan  Acute cystitis without hematuria  -UA w/ pyuria, LE positive, no " nitrites; dysuria , acute AMS  -Hx of ESBL but previously susceptible to Zosyn  -Continue zosyn  -Monitor for clinical response, WBC wnl  -Adjust antibiotics if cultures indicate resistance  -Monitor for urosepsis, although afebrile and lactate normal  - ID consult with thanks - unfortunately this was placed for wrong team - this was corrected.  - Ucx with mixed growth - discussed with the lab - dominant organism is proteus and truly swarming the plate - they will provide resistant profile for this organism    CALI on CKD  -Cr 1.9/BUN 29, baseline about 1.5   -Likely pre-renal azotemia in setting of acute infection/dehydration  -Bladder scan reveals 160cc  -Trend CMP daily, strict I&Os  -Avoid nephrotoxic medications, meds renally dosed  - hold torsemide and trend, may be able to add back tomorrow. Renal function had improved yesterday so this was resumed but now back to 1.9; will ensure pt. Taking in normal amount of PO    Chronic Macrocytic anemia  Chronic Thrombocytopenia  -Hgb 8.8 on presentation, Baseline Hb ~8-9  -.4, plts 91  -Follow up B12, folate  -Monitor CBC  -No signs of overt GIB    CAD  HFeEF  HTN  -History of CABG in 1996  -No recent stress test or cath available on file  -Previously on plavix and aspirin; Plavix had been discontinued over the last several months due to recurrent GI bleeds.  -Continue aspirin 81 mg daily  -Hypotensive on admission-hold home torsemide 20 mg daily (was resumed yesterday with worsening renal function to 1.9 again this morning(     Recurrent pleural effusions  -CT shows signs of right sided volume overload  -Left > right effusions, history of multiple thoracenteses  -No signs of respiratory distress, saturating 100% on RA  Continue home torsemide 20 mg daily  -Add PRN dose if symptomatic or worsening oxygenation  -Monitor weight, I/O, renal function  -Family requests repeat thoracentesis by IR prior to discharge; will consult IR in the morning for thoracentesis  -  discussed with family that the rapidity of reaccumulation is concerning - appreciate palliative care input    A-fib   -Hx of prior atrial fibrillation  -Home regimen Not on AC due to history of GI bleed  -Resume metoprolol  -SCDs while inpt  -Follows with Dr. Roshan Jeffrey at [facility]    Hypothyroidism  -TSH .87  -Home medication: synthroid 100mcg   -Continue home medication     BPH  -Resumed home finasteride and tamsulosin  -Daily bladder scans to ensure no urinary retention    Thrombocytopenia (CMS/HCC)  - chronic, stable. With macrocytic anemia and thrombocytopenia, there is c/f underlying MDS. Given chronicity and stability, outpatient hematology f/u is likely appropriate, and depends on goals of care.  Toxic metabolic encephalopathy  - suspect due to UTI, CT head NAD  - markedly improved today  Debility  - appreciate palliative care input. Goals of care remain restorative.  - PT/OT    VTE Assessment: Padua    VTE Prophylaxis:  Current anticoagulants:    None      Code Status: DNR (A.N.D.)      Estimated Discharge Date: 5/27/2025   Disposition Planning: suspect 24-48 hours P urine cultures, presence of resistant organism, thoracentesis          Desire Zavala MD  5/26/2025

## 2025-05-26 NOTE — PLAN OF CARE
Problem: Adult Inpatient Plan of Care  Goal: Plan of Care Review  Flowsheets (Taken 5/26/2025 7380)  Progress: no change   See RD note for recs

## 2025-05-26 NOTE — CONSULTS
Nutrition Assessment    Recommendations    Adjust to 3-4gm CHELSI  Ensure plus 1/day     Clinical Course: Patient is a 89 y.o. male who was admitted on 5/24/2025 with a diagnosis of Lower urinary tract infectious disease [N39.0]  Altered mental status, unspecified altered mental status type [R41.82]  Pneumonia due to infectious organism, unspecified laterality, unspecified part of lung [J18.9].     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)     Coronary artery disease     Disease of thyroid gland     Will catheter in place     6/25 not at present    GI (gastrointestinal bleed)     Hypertension     Myocardial infarction (CMS/HCC)     Orthostatic hypotension     TIA (transient ischemic attack)      Past Surgical History   Procedure Laterality Date    cataract extraction right eye with implant and limbal relaxing incision Right 7/18/2024    Performed by Hayder Moses MD at  OR [facility]    Cataract extraction w/ intraocular lens implant Left     Cataract extraction with LRI and anterior vitrectomy left eye Left 11/16/2023    Performed by Hayder Moses MD at  OR [facility]    Cholecystectomy      Colostomy      COLOSTOMY LAPAROSCOPIC N/A 8/3/2022    Performed by Mat Bryant MD at [facility] OR    Coronary artery bypass graft      DIAGNOSTIC FLEXIBLE SIGMOIDOSCOPY WITH COLLECTION OF SPECIMEN BY WASHING N/A 11/27/2024    Performed by David Holcomb MD at [facility] GI    DIAGNOSTIC FLEXIBLE SIGMOIDOSCOPY WITH COLLECTION OF SPECIMEN BY WASHING N/A 10/18/2024    Performed by Maine Lopez MD at [facility]    DIAGNOSTIC UPPER GASTROINTESTINAL ENDOSCOPY WITH COLLECTION OF SPECIMEN BY WASHING N/A 11/27/2024    Performed by David Holcomb MD at [facility]    FLEXIBLE SIGMOIDOSCOPY WITH BIOPSY N/A 10/23/2024    Performed by David Holcomb MD at [facility]    INCISION  "AND DRAINAGE WITH DEBRIDMENT OF BUTTOCK, AND PERINEUM N/A 8/2/2022    Performed by [place] [facility] [place] [facility] [place]    Joint replacement Left     Knee    RIGHT HEART CATH N/A 8/4/2022    Performed by Cristal Lacey [place] [facility]    Thyroidectomy      WASHOUT OF PERINEAL WOUND, COLONIC LAVAGE N/A 8/3/2022    Performed by Mat Bryant MD at [facility] [place]       Reason for Assessment  Reason For Assessment: identified at risk by screening criteria     [facility] Nutrition Screen Tool  Has patient lost weight without trying?: 2-->Unsure  Has patient been eating poorly due to decreased appetite?: 0-->No  [facility] Nutrition Screen Score: 2     RETS18 Physical Appearance  Last Bowel Movement: 05/24/25 (colostomy)  Skin: intact     Nutrition Order  Nutrition Order: meets nutritional requirements  Nutrition Order Comments: cardiac     Anthropometrics  Height: 185.4 cm (6' 1\")       Current Weight  Weight Method: Bed scale  Weight: 73.5 kg (162 lb)     Ideal Body Weight (IBW)  Ideal Body Weight (IBW) (kg): 84.86  % Ideal Body Weight: 86.59     Body Mass Index (BMI)  BMI (Calculated): 21.4     Labs/Procedures/Meds  Lab Results Reviewed: reviewed, pertinent   Lab Results   Component Value Date    GLUCOSE 87 05/26/2025    CALCIUM 7.9 (L) 05/26/2025     05/26/2025    K 3.8 05/26/2025    CO2 22 05/26/2025     (H) 05/26/2025    BUN 30 (H) 05/26/2025    CREATININE 1.9 (H) 05/26/2025     Results from last 7 days   Lab Units 05/26/25  0441 05/25/25  1200 05/24/25  1610   SODIUM mEQ/L 142 142 143   POTASSIUM mEQ/L 3.8 3.9 3.9   CHLORIDE mEQ/L 110* 111* 111*   CO2 mEQ/L 22 23 25   BUN mg/dL 30* 28* 29*   CREATININE mg/dL 1.9* 1.6* 1.9*   EGFR mL/min/1.73m*2 32.5* 39.9* 32.5*   GLUCOSE mg/dL 87 149* 151*   CALCIUM mg/dL 7.9* 8.1* 8.4*   ALBUMIN g/dL 2.7*  --  3.1*   PHOSPHORUS mg/dL 3.5  --   --        Medications  Pertinent Medications Reviewed: reviewed, pertinent   Scheduled Meds:   aspirin  81 mg oral Daily    buPROPion XL  150 " mg oral Daily    carboxymethylcellulose  1 drop Both Eyes q2h while awake    cyanocobalamin  500 mcg oral q AM    ferrous sulfate  325 mg oral Daily with breakfast    finasteride  5 mg oral Daily    latanoprost  1 drop Both Eyes Nightly    levothyroxine  100 mcg oral Daily before breakfast    melatonin  10 mg oral Nightly    meropenem  1 g intravenous q12h INT    metoprolol succinate XL  12.5 mg oral Daily    pantoprazole  20 mg oral Daily    tamsulosin  0.4 mg oral q AM    thiamine HCl  100 mg oral q AM    [Provider Managed Hold] torsemide  20 mg oral BID     Continuous Infusions:  PRN Meds:.  calcium (as carbonate)    clonazePAM  Estimated/Assessed Needs  Additional Documentation: Calorie Requirements (Group), Protein Requirements (Group)     Calorie Requirements  Estimated kCal Needs: Actual Body Weight (73.5kg)  Estimated Calorie Need Method: kcal/kg  Calorie/kg Recommended: 25-30  Calorie Recommendations: [id number]       Protein Requirements  Recommended Dosing Weight (Estimated Protein Needs): Actual Body Weight  Est Protein Requirement Amount (gms/kg): 1.5-->1.5 gm protein  Protein Recommendations:      Fluid requirements~1840mL (25ml/kg)     Dosing weight 73.5kg ABW    Skin:free from PI   No edema noted    Clinical comments:  Pt seen for MST=2   162# (5/24)   172# (4/2/25)   -10# over ~1.5 months (6%)   Pt with acute cystitis w/o hematuria on admission, noted CLAI on CKD hx HFrEF, HTN, A fib. Metabolic encephalopathy. Aox3. Plan fir thora with IR tomorrow. Pt resting during time of visit currently on cardiac diet -recommend adjusting to 3-4gm CHELSI to optimize PO intakes + trial ONS 1/day.   Will continue to monitor and reassess per protocol unless otherwise consulted.   Goals:PO intake >75% of nutrient needs.  Monitor: Diet order, appetite, PO intake, labs, I/Os, skin integrity, wt status, GI function.     Recommendations: See above     PES  Statement: PES Statement  Nutrition Diagnosis: Unintended  Weight Loss  Related To:: Decreased ability to consume sufficient energy  As Evidenced By:: wt loss of 10# over ~1.5 months  Nutritional Needs Met?: No                                   Date: 05/26/25  Signature: BRANDY Bermudez

## 2025-05-26 NOTE — ED ATTESTATION NOTE
Samy Elena was evaluated and managed by the physician assistant. I was available for immediate consult. I agree with the physician assistants interpretation of laboratory studies as well as the care provided to this patient.     Cameron Padilla,   05/26/25 1713

## 2025-05-27 NOTE — PLAN OF CARE
Plan of Care Review  Plan of Care Reviewed With: patient  Progress: improving  Outcome Evaluation: Pt received in bed, AAOx3, confused at times. VSS. Scheduled medications administered. Pt denies pain. OOB during shift with walker and x2 assist. Incontinent of urine, colostomy in place. FSP maintained. Call bell within reach.

## 2025-05-27 NOTE — PROGRESS NOTES
[facility]. Sent referral request to [facility].  Await confirmation of acceptance to [facility] visiting providers practice.   Addendum: Received confirmation from Jo Beltran at [facility] that patient has been accepted for services.  Ami Soto [phone]

## 2025-05-27 NOTE — PLAN OF CARE
Care Coordination Admission Assessment Note    General Information:  Readmission Within the last 30 days: no previous admission in last 30 days  Does patient have a :    Patient-Specific Goals (include timeframe):      Living Arrangements:  Arrived From: home  Current Living Arrangements: home  People in Home: other (see comments) (24-hour HHA)  Home Accessibility: ramp to enter home  Living Arrangement Comments: ramp to enter home, stays on first floor, where there is a powder room    Housing Stability and Utility Access (SDOH):  In the last 12 months, was there a time when you were not able to pay the mortgage or rent on time?: No  In the past 12 months, how many times have you moved?:    At any time in the past 12 months, were you homeless or living in a shelter (including now)?: No  In the past 12 months has the electric, gas, oil, or water company threatened to shut off services in your home?: No    Functional Status Prior to Admission:   Assistive Device/Animal Currently Used at Home: hospital bed, walker, front-wheeled, wheelchair, ramps  Functional Status Comments:    IADL Comments:       Supports and Services:  Current Outpatient/Agency/Support Group:    Type of Current Home Care Services:    History of home care episode or rehab stay: current with [facility], has 24-hour private duty HHA. Has been to [facility]    Discharge Needs Assessment:   Concerns to be Addressed: discharge planning  Current Discharge Risk: chronically ill  Anticipated Changes Related to Illness: inability to care for self    Patient/Family Anticipated Discharge Plan:  Patient/Family Anticipates Transition To: home with help/services  Patient/Family Anticipated Services at Transition: home health care    Connection to Community       Patient Choice:   Offered/Gave Vendor List: yes  Patient and/or patient guardian/advocate was made aware of their right to choose a provider. A list of eligible  providers was presented and reviewed with the patient and/or patient guardian/advocate in written and/or verbal form. The list delineates providers in the patient’s desired geographic area who are participating in the Medicare program and/or providers contracted with the patient’s primary insurance. The Medicare list and quality ratings were obtained from the Medicare.gov [medicare.gov] website.    Anticipated Discharge Plan:  Met with patient. Provided education and contact information for Care Coordination services.: yes  Anticipated Discharge Disposition: home with home health  Type of Home Care Services: home OT, home PT, nursing, home health aide    Transportation Needs (SDOH):  Transportation Concerns:    Transportation Anticipated: health plan transportation, family or friend will provide  Is Out of Hospital DNR needed at discharge?:      In the past 12 months, has lack of transportation kept you from medical appointments or from getting medications?: No  In the past 12 months, has lack of transportation kept you from meetings, work, or from getting things needed for daily living?: No    Concerns - comments: Spoke with kaykay Zhu to complete admission assessment. Patient lives with 24-hour private caregivers, verified phone numbers, address, PCP and pharmacy. Patients PCP was with [facility] but family says they are still practicing. The plan would be for home, if family cannot transport, would need BLS. Marko requests time to let 24-hour caregiver know to be in place ahead of dc. Per DCP, may be ready tomorrow, asked team to speak with family (kaykay Long is available, Marko going into meetings).

## 2025-05-27 NOTE — PROGRESS NOTES
Occupational Therapy -  Initial Evaluation     Patient: Samy Elena  Location: [place] [address]  MRN: [id number]  Today's date: 5/27/2025    HISTORY OF PRESENT ILLNESS     Samy is a 89 y.o. male admitted on 5/24/2025 with Lower urinary tract infectious disease [N39.0]  Altered mental status, unspecified altered mental status type [R41.82]  Pneumonia due to infectious organism, unspecified laterality, unspecified part of lung [J18.9]. Principal problem is Acute cystitis without hematuria.    Past Medical History  Samy has a past medical history of Aneurysm of internal iliac artery (CMS/[facility]), Atrial fibrillation (CMS/[facility]), CHF (congestive heart failure) (CMS/[facility]), 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[facility]), Coronary artery disease, Disease of thyroid gland, Will catheter in place, GI (gastrointestinal bleed), Hypertension, Myocardial infarction (CMS/[facility]), Orthostatic hypotension, and TIA (transient ischemic attack).    History of Present Illness  89 y.o. male who was admitted on 5/24/2025 after p/t ED with confusion.     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  Has a history of UTIs.  History of ESBL UTI.  Has a history of bladder outlet obstruction with an enlarged prostate.  His creatinine was elevated at 1.9.     Urinalysis with too numerous to count white cells, bacteria, trace protein, cloudy, leukocyte esterase.   HCT: (-)    PRIOR LEVEL OF FUNCTION AND LIVING ENVIRONMENT     Prior Level of Function      Flowsheet Row Most Recent Value   Dominant Hand right   Ambulation assistive equipment and person   Transferring assistive equipment and person   Toileting assistive equipment and person   Bathing assistive person   Dressing  assistive person   Eating independent   IADLs completely dependent   Driving/Transportation friends/family   Communication understands/communicates without difficulty   Prior Level of Function Comment pt has 24/7 assistance from son and HHA, reports he may take a few steps with a RW, but usually uses a w/c   Assistive Device Currently Used at Home hospital bed, walker, front-wheeled, wheelchair, ramps        History of Falls: No falls in the past year    Prior Living Environment      Flowsheet Row Most Recent Value   People in Home other (see comments)  [24-hour HHA]   Current Living Arrangements home   Home Accessibility ramp to enter home   Living Environment Comment lives in 2SH with FFSU with ramp to enter, hospital bed and tub/shower          Occupational Profile      Flowsheet Row Most Recent Value   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 [place]   Performance Patterns enjoys watching [place] sports   Environmental Supports and Barriers has 24/7 A, 1-2 people            VITALS AND PAIN     OT Vitals      Date/Time Pulse HR Source SpO2 Pt Activity O2 Therapy BP MAP BP Location BP Method Pt Position [facility]   05/27/25 1014 88 Monitor 94 % At rest None (Room air) 102/58 75 mmHg Right upper arm Automatic Lying LW   05/27/25 1030 105 Monitor 95 % At rest None (Room air) 107/61 78 mmHg Right upper arm Automatic Sitting LW          OT Pain      Date/Time Pain Type Rating: Rest Rating: Activity [facility]   05/27/25 1014 Pain Assessment 0 - no pain 0 - no pain LW             Objective     OBJECTIVE     Start time:  1011  End time:  1034  Session Length: 23 min  Mode of Treatment: co-treatment  Reason for co-treatment: Expedite DC recs and maintain pt safety    General Observations  Patient received in bed, reclined. He was agreeable to therapy, no issues or concerns identified by nurse prior to session. Resting awake in bed.    Precautions: fall, contact        Limitations/Impairments: safety/cognitive, hearing   Services  Do You Speak a Language Other Than English at Home?: no      OT Eval and Treat - 05/27/25 1011          Cognition    Orientation Status oriented to;person;place     Affect/Mental Status flat/blunted affect     Follows Commands follows one-step commands;75-90% accuracy;repetition of directions required;verbal cues/prompting required;physical/tactile prompts required     Cognitive Function executive function deficit;memory deficit     Executive Function Deficit self-monitoring/self-correction;insight/awareness of deficits;planning/decision-making;problem-solving/reasoning;minimal deficit     Memory Deficit minimal deficit;working memory     Comment, Cognition Pt pleasant & motivated t/o session, able to express needs. Follows 1 step directions accurately, cueing for multistep and hard of hearing. Cueing for executive functioning for problem solving and insight into some deficits, does report accurately he cannot stand by himself and recalls location of items out of his sight.     Cognitive Interventions/Strategies occupation/activity based interventions        Vision Assessment/Intervention    Vision Assessment corrective lenses full-time        Hearing Assessment    Hearing Status hearing aid, bilateral;hearing impairment, bilaterally   not present    Impact of Hearing Impairment on Functional Status benefits from incr. speaking volume and repetition        Sensory Assessment    Sensory Assessment sensation intact, upper extremities   grossly intact BUE sensation       Upper Extremity Range of Motion    Shoulder, Left (ROM) 105-115* flex/abduction     Shoulder, Right (ROM) <90* flex/abduction        Upper Extremity Strength    Shoulder, Left (Strength) 4-/5     Elbow, Left (Strength) 4+/5     Hand, Left (Strength) 4/5     Shoulder, Right (Strength) 4-/5     Elbow, Right (Strength) 4+/5     Hand, Right (Strength) 4/5        Bed Mobility     Bed Mobility Activities supine to sit     Mora minimum assist (75% or more patient effort);1 person assist     Safety/Cues increased time to complete;verbal cues;technique     Assistive Device bed rails;head of bed elevated     Comment Bed mobility completed with Min A for supine>sit to right, effortful. Assist required to scoot while sitting EOB        Mobility Belt    Mobility Belt Used During Session no - medical contraindication     Reason Mobility Belt Not Used ostomy        Sit/Stand Transfer    Surface edge of bed;chair with arm rests     Mora minimum assist (75% or more patient effort);1 person assist     Safety/Cues increased time to complete;verbal cues;preparatory posture;technique;hand placement     Assistive Device walker, front-wheeled     Transfer Comments Min A to clear surfaces, cueing for posiitoning/technique w/ RW        Surface-to-Surface Transfers    Transfer Location bed to chair     Transfer Technique stand step     Mora moderate assist (50-74% patient effort);1 person assist     Safety/Cues increased time to complete;preparatory posture     Assistive Device walker, front-wheeled     Transfer Comments Mod A for stand step transfer bed>recliner, for RW management and balance. Fatigued with sustained standing        Functional Mobility    Functional Mobility Mora not tested     Functional Mobility Comments baseline WC use        Lower Body Dressing    Tasks doff;don;socks     Mora dependent (less than 25% patient effort)     Safety/Cues increased time to complete     Position edge of bed sitting     Adaptive Equipment none     Comment Dep A for sock donning sitting EOB, impaired functional reach and LE AROM        Grooming    Tasks washes, rinses and dries hands;washes, rinses and dries face     Mora distant supervision     Safety/Cues increased time to complete;compensatory techniques;energy conservation     Position supported sitting      Adaptive Equipment none     Comment Seated grooming indicated for endurance/balance, set up with initial cueing for hygiene accuracy        Self-Feeding    Tasks liquids to mouth     [place] independent     Position supported sitting     Comment No assist for feeding during session        Balance    Static Sitting Balance WFL;sitting, edge of bed;supported     Dynamic Sitting Balance sitting, edge of bed;mild impairment;supported     Sit to Stand Dynamic Balance moderate impairment;supported     Static Standing Balance mild impairment;supported     Dynamic Standing Balance supported;moderate impairment     Balance Interventions occupation based/functional task     Comment, Balance UE support sitting balance, and benefits from RW/UE support w/ assist for standing balance        Impairments/Safety Issues    Impairments Affecting Function balance;cognition;endurance/activity tolerance;motor planning;strength     Cognitive Impairments, Safety/Performance problem-solving/reasoning;awareness, need for assistance;sequencing abilities;insight into deficits/self-awareness     Functional Endurance tolerated light ADLs, fatigued during transfer/sustained standing > 2 minutes     Safety Issues Affecting Function insight into deficits/self-awareness;ability to follow commands;positioning of assistive device;problem-solving     Comment, Safety Issues/Impairments cueing for safety during dynamic ADLs                                              Education Documentation  Rehabilitation Therapy, taught by Pietro Hadley OT at 5/27/2025  2:31 PM.  Learner: Patient  Readiness: Acceptance  Method: Explanation, Demonstration  Response: Needs Reinforcement  Comment: OT role, POC, DC rec, fall prevention, AD management and DME use, ADL compensatory strategies, energy conservation, assist levels and activity recommendations, call bell use    Home Safety, taught by Pietro Hadley OT at 5/27/2025  2:31 PM.  Learner:  Patient  Readiness: Acceptance  Method: Explanation, Demonstration  Response: Needs Reinforcement  Comment: OT role, POC, DC rec, fall prevention, AD management and DME use, ADL compensatory strategies, energy conservation, assist levels and activity recommendations, call bell use    Self-Care, taught by Pietro Hadley OT at 5/27/2025  2:31 PM.  Learner: Patient  Readiness: Acceptance  Method: Explanation, Demonstration  Response: Needs Reinforcement  Comment: OT role, POC, DC rec, fall prevention, AD management and DME use, ADL compensatory strategies, energy conservation, assist levels and activity recommendations, call bell use        Session Outcome  Patient in chair at end of session, chair alarm on, all needs met, call light in reach, personal items in reach. Nursing notified about patient's performance, patient's position, and patient's response to therapy/activity.    AM-PAC™ - ADL (Current Function)     Putting on/taking off regular lower body clothing 1 - Total   Bathing 2 - A Lot   Toileting 1 - Total   Putting on/taking off regular upper body clothing 3 - A Little   Help for taking care of personal grooming 3 - A Little   Eating meals 4 - None   AM-PAC™ ADL Score 14          ASSESSMENT AND PLAN     Progress Summary  OT evaluation completed, with pt demonstrating impaired  occupational performance skills including impaired strength, endurance, balance, and cognition, affecting his current need for Min A bed mobility, Mod A functional transfers w/ RW, Max-Dep A LB ADLs, Sup UB ADLs for accuracy. D/t today's performance and pt's PLOF, recommend DC home with continued 24/7 HHA and home OT. Pt will continue to benefit from acute OT to address the above limiting factors per POC.    Patient/Family Therapy Goal Statement: go home safely    OT Plan      Flowsheet Row Most Recent Value   Rehab Potential good, to achieve stated therapy goals at 05/27/2025 1011   Therapy Frequency 3 times/wk at 05/27/2025 1011    Planned Therapy Interventions activity tolerance training, adaptive equipment training, BADL retraining, functional balance retraining, occupation/activity based interventions, patient/caregiver education/training, strengthening exercise, transfer/mobility retraining at 05/27/2025 1011            OT Discharge Recommendations      Flowsheet Row Most Recent Value   OT Recommended Discharge Disposition home with assistance, home with home health  [has 24/7 A x2 per pt report] at 05/27/2025 1011   Anticipated Equipment Needs if Discharged Home (OT) none  [owns rec'd DME] at 05/27/2025 1011                 OT Goals      Flowsheet Row Most Recent Value   Transfer Goal 1    Activity/Assistive Device bed-to-chair/chair-to-bed, commode, 3-in-1 at 05/27/2025 1011   [place] minimum assist (75% or more patient effort) at 05/27/2025 1011   Time Frame by discharge at 05/27/2025 1011   Progress/Outcome goal ongoing at 05/27/2025 1011   Dressing Goal 1    Activity/Adaptive Equipment upper body dressing at 05/27/2025 1011   [place] set-up required at 05/27/2025 1011   Time Frame by discharge at 05/27/2025 1011   Progress/Outcome goal ongoing at 05/27/2025 1011   Toileting Goal 1    Activity/Assistive Device toileting skills, all at 05/27/2025 1011   [place] minimum assist (75% or more patient effort) at 05/27/2025 1011   Time Frame by discharge at 05/27/2025 1011   Progress/Outcome goal ongoing at 05/27/2025 1011   Grooming Goal 1    Activity/Assistive Device grooming skills, all at 05/27/2025 1011   [place] set-up required at 05/27/2025 1011   Time Frame by discharge at 05/27/2025 1011   Progress/Outcome goal ongoing at 05/27/2025 1011

## 2025-05-27 NOTE — ASSESSMENT & PLAN NOTE
-UA w/ pyuria, LE positive, no nitrites; dysuria , acute AMS  -Hx of ESBL but previously susceptible to Zosyn  -Continue zosyn  -Monitor for clinical response, WBC wnl  -Adjust antibiotics if cultures indicate resistance  -Monitor for urosepsis, although afebrile and lactate normal  - ID consult with thanks - unfortunately this was placed for wrong team - this was corrected.  - Ucx with mixed growth - discussed with the lab - dominant organism is proteus and truly swarming the plate - they will provide resistant profile for this organism    CALI on CKD  -Cr 1.9/BUN 29, baseline about 1.5   -Likely pre-renal azotemia in setting of acute infection/dehydration  -Bladder scan reveals 160cc  -Trend CMP daily, strict I&Os  -Avoid nephrotoxic medications, meds renally dosed  - hold torsemide and trend, may be able to add back tomorrow. Renal function had improved yesterday so this was resumed but now back to 1.9; will ensure pt. Taking in normal amount of PO    Chronic Macrocytic anemia  Chronic Thrombocytopenia  -Hgb 8.8 on presentation, Baseline Hb ~8-9  -.4, plts 91  -Follow up B12, folate  -Monitor CBC  -No signs of overt GIB    CAD  HFeEF  HTN  -History of CABG in 1996  -No recent stress test or cath available on file  -Previously on plavix and aspirin; Plavix had been discontinued over the last several months due to recurrent GI bleeds.  -Continue aspirin 81 mg daily  -Hypotensive on admission-hold home torsemide 20 mg daily (was resumed yesterday with worsening renal function to 1.9 again this morning(     Recurrent pleural effusions  -CT shows signs of right sided volume overload  -Left > right effusions, history of multiple thoracenteses  -No signs of respiratory distress, saturating 100% on RA  Continue home torsemide 20 mg daily  -Add PRN dose if symptomatic or worsening oxygenation  -Monitor weight, I/O, renal function  -Family requests repeat thoracentesis by IR prior to discharge; will consult IR in the  morning for thoracentesis  - discussed with family that the rapidity of reaccumulation is concerning - appreciate palliative care input    A-fib   -Hx of prior atrial fibrillation  -Home regimen Not on AC due to history of GI bleed  -Resume metoprolol  -SCDs while inpt  -Follows with Dr. Roshan Jeffrey at [facility]    Hypothyroidism  -TSH .87  -Home medication: synthroid 100mcg   -Continue home medication     BPH  -Resumed home finasteride and tamsulosin  -Daily bladder scans to ensure no urinary retention

## 2025-05-27 NOTE — PLAN OF CARE
Problem: Adult Inpatient Plan of Care  Goal: Plan of Care Review  Outcome: Progressing  Flowsheets (Taken 5/27/2025 1052)  Progress: improving  Outcome Evaluation: OT eval completed, rec DC home w/ home health and 24/7 assist.  Plan of Care Reviewed With: patient     Problem: Self-Care Deficit  Goal: Improved Ability to Complete Activities of Daily Living  Outcome: Progressing  Intervention: Promote Activity and Functional Long  Flowsheets (Taken 5/27/2025 1058)  Activity Assistance Provided: assistance, 1 person  Self-Care Promotion:   independence encouraged   BADL personal objects within reach

## 2025-05-27 NOTE — PROGRESS NOTES
Physical Therapy -  Initial Evaluation     Patient: Samy Elena  Location: [place] [address]  MRN: [id number]  Today's date: 5/27/2025    HISTORY OF PRESENT ILLNESS     Samy is a 89 y.o. male admitted on 5/24/2025 with Lower urinary tract infectious disease [N39.0]  Altered mental status, unspecified altered mental status type [R41.82]  Pneumonia due to infectious organism, unspecified laterality, unspecified part of lung [J18.9]. Principal problem is Acute cystitis without hematuria.    Past Medical History  Samy has a past medical history of Aneurysm of internal iliac artery (CMS/[facility]), Atrial fibrillation (CMS/[facility]), CHF (congestive heart failure) (CMS/[facility]), 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[facility]), Coronary artery disease, Disease of thyroid gland, Will catheter in place, GI (gastrointestinal bleed), Hypertension, Myocardial infarction (CMS/[facility]), Orthostatic hypotension, and TIA (transient ischemic attack).    History of Present Illness  89 y.o. male who was admitted on 5/24/2025 after p/t ED with confusion.     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  Has a history of UTIs.  History of ESBL UTI.  Has a history of bladder outlet obstruction with an enlarged prostate.  His creatinine was elevated at 1.9.     Urinalysis with too numerous to count white cells, bacteria, trace protein, cloudy, leukocyte esterase.   HCT: (-)    PRIOR LEVEL OF FUNCTION AND LIVING ENVIRONMENT     Prior Level of Function      Flowsheet Row Most Recent Value   Dominant Hand right   Ambulation assistive equipment and person   Transferring assistive equipment and person   Toileting assistive equipment and person   Bathing assistive person   Dressing  assistive person   Eating independent   IADLs completely dependent   Driving/Transportation friends/family   Communication understands/communicates without difficulty   Prior Level of Function Comment pt has 24/7 assistance from son and HHA, reports he may take a few steps with a RW, but usually uses a w/c   Assistive Device Currently Used at Home hospital bed, walker, front-wheeled, wheelchair, ramps        History of Falls: No falls in the past year    Prior Living Environment      Flowsheet Row Most Recent Value   People in Home other (see comments)  [24-hour HHA]   Current Living Arrangements home   Home Accessibility ramp to enter home   Living Environment Comment lives in 2SH with FF with ramp to enter, hospital bed and tub/shower            VITALS AND PAIN     PT Vitals      Date/Time Pulse HR Source SpO2 Pt Activity O2 Therapy BP MAP BP Location BP Method Pt Position [facility]   05/27/25 1014 88 Monitor 94 % At rest None (Room air) 102/58 75 mmHg Right upper arm Automatic Lying LW   05/27/25 1030 105 Monitor 95 % At rest None (Room air) 107/61 78 mmHg Right upper arm Automatic Sitting LW          PT Pain      Date/Time Pain Type Rating: Rest Rating: Activity [facility]   05/27/25 1014 Pain Assessment 0 - no pain 0 - no pain LW             Objective     OBJECTIVE     Start time:  1012  End time:  1032  Session Length: 20 min  Mode of Treatment: co-treatment  Reason for co-treatment: skilled Ax2    General Observations  Patient received reclined, in bed. He was no issues or concerns identified by nurse prior to session, agreeable to therapy. pt lying in bed, agreeable to get OOB to chair    Precautions: fall, contact       Limitations/Impairments: safety/cognitive, hearing      PT Eval and Treat - 05/27/25 1032          Cognition    Orientation Status oriented to;person;place     Affect/Mental Status flat/blunted affect     Behavioral Issues withdrawn     Follows Commands follows one-step commands;75-90%  accuracy;repetition of directions required;verbal cues/prompting required;physical/tactile prompts required;initiation impaired;increased processing time needed     Cognitive Function executive function deficit     Executive Function Deficit self-monitoring/self-correction;insight/awareness of deficits;planning/decision-making;problem-solving/reasoning        Vision Assessment/Intervention    Vision Assessment corrective lenses full-time        Hearing Assessment    Hearing Status hearing aid, bilateral   not present today, +[place]       Sensory Assessment    Sensory Assessment unable/difficult to assess        Lower Extremity Range of Motion    Hip, Left (ROM) hip flexor tightness     Hip, Right (ROM) hip flexor tightness        Lower Extremity Strength    Hip, Left (Strength) 2/5     Knee, Left (Strength) 3/5     Ankle, Left (Strength) 4/5     Hip, Right (Strength) 2/5     Knee, Right (Strength) 3/5     Ankle, Right (Strength) 4/5        Motor Skills    Motor Skills postural control     Coordination bilateral;lower extremity;moderate impairment        Postural Control Assessment    Issues Impacting Performance impaired perception of midline;impaired motor problem solving        Bed Mobility    Bed Mobility Activities supine to sit     [place] minimum assist (75% or more patient effort);1 person assist     Safety/Cues increased time to complete;verbal cues;tactile cues;initiation     Assistive Device bed rails;head of bed elevated        Mobility Belt    Mobility Belt Used During Session yes        Sit/Stand Transfer    Surface chair with arm rests;edge of bed     [place] minimum assist (75% or more patient effort);1 person assist     Safety/Cues increased time to complete;tactile cues;verbal cues;initiation;hand placement;preparatory posture     Assistive Device walker, front-wheeled        Gait Training    [place], Gait minimum assist (75% or more patient effort);moderate assist (50-74% patient  effort);2 person assist     Safety/Cues increased time to complete;verbal cues;tactile cues;preparatory posture;proper use of assistive device;maintaining center of gravity over base of support;sequencing     Assistive Device walker, front-wheeled     Distance in Feet 4 feet     Pattern step-to     Deviations/Abnormal Patterns antalgic;festinating/shuffling;step length decreased;base of support, narrow;jazlyn decreased;weight shifting decreased     Comment 2' fwd + 2' to R, with mod verbal + tactile cues to wt-shift and advance each leg; distance limited by fatigue; pt maintains neck, trunk and hip flexion, despite cues        Balance    Static Sitting Balance mild impairment;sitting, edge of bed     Dynamic Sitting Balance mild impairment;sitting, edge of bed     Sit to Stand Dynamic Balance moderate impairment;supported     Static Standing Balance supported;mild impairment     Dynamic Standing Balance moderate impairment;supported     Comment, Balance with RW        Impairments/Safety Issues    Impairments Affecting Function balance;cognition;endurance/activity tolerance;motor planning;strength     Cognitive Impairments, Safety/Performance problem-solving/reasoning;awareness, need for assistance;sequencing abilities;insight into deficits/self-awareness     Safety Issues Affecting Function insight into deficits/self-awareness;ability to follow commands;positioning of assistive device;problem-solving                                              Education Documentation  Treatment Plan, taught by Rubina Jose PT at 5/27/2025  2:27 PM.  Learner: Patient  Readiness: Acceptance  Method: Explanation  Response: Verbalizes Understanding, Needs Reinforcement  Comment: Pt ed re: role of PT and mobility recs.        Session Outcome  Patient upright, in chair at end of session, personal items in reach, call light in reach, all needs met. Nursing notified about change in vital signs, patient's performance, and patient's  "position.    AM-PAC™ - Mobility (Current Function)     Turning form your back to your side while in flat bed without using bedrails 3 - A Little   Moving from lying on your back to sitting on the side of a flat bed without using bedrails 3 - A Little   Moving to and from a bed to a chair 2 - A Lot   Standing up from a chair using your arms 2 - A Lot   To walk in a hospital room 2 - A Lot   Climbing 3-5 steps with a railing 2 - A Lot   AM-PAC™ Mobility Score 14          ASSESSMENT AND PLAN     Progress Summary  Pt presents with impaired cognition and functional mobility. He is reportedly close to his baseline, and has 24/7 assistance at home and all necessary DME. Recommend d/c to home with assistance and HPT.    Patient/Family Therapy Goals Statement: \"go home\"    PT Plan      Flowsheet Row Most Recent Value   Rehab Potential good, to achieve stated therapy goals at 05/27/2025 1032   Therapy Frequency 2 times/wk at 05/27/2025 1032   Planned Therapy Interventions balance training, bed mobility training, gait training, home exercise program, strengthening, postural re-education, patient/family education, transfer training at 05/27/2025 1032            PT Discharge Recommendations      Flowsheet Row Most Recent Value   PT Recommended Discharge Disposition home with assistance, home with home health at 05/27/2025 1032   Anticipated Equipment Needs if Discharged Home (PT) none at 05/27/2025 1032                 PT Goals      Flowsheet Row Most Recent Value   Bed Mobility Goal 1    Activity/Assistive Device sit to supine/supine to sit at 05/27/2025 1032   [place] supervision required at 05/27/2025 1032   Time Frame by discharge at 05/27/2025 1032   Progress/Outcome goal ongoing at 05/27/2025 1032   Transfer Goal 1    Activity/Assistive Device sit-to-stand/stand-to-sit, bed-to-chair/chair-to-bed at 05/27/2025 1032   [place] supervision required at 05/27/2025 1032   Time Frame by discharge at 05/27/2025 1032 "   Progress/Outcome goal ongoing at 05/27/2025 1032   Gait Training Goal 1    Activity/Assistive Device walker, front-wheeled at 05/27/2025 1032   [place] minimum assist (75% or more patient effort) at 05/27/2025 1032   Distance >25' at 05/27/2025 1032   Time Frame by discharge at 05/27/2025 1032   Progress/Outcome goal ongoing at 05/27/2025 1032

## 2025-05-27 NOTE — PROGRESS NOTES
Infectious Disease Progress Note    Patient Name: Samy Elena  MR#: [id number]  : 10/6/1930  Admission Date: 2025  Date: 25   Time: 1:08 PM   Author: Josh Foote DO        Antibiotics:  Anti-infectives (From admission, onward)      Start     Dose/Rate Route Frequency Ordered Stop    25 1400  meropenem (MERREM) IVPB 1 g in 100 mL NSS vial in bag        Note to Pharmacy: Borderline renal function, let me know if should adjust down.    1 g  200 mL/hr over 30 Minutes intravenous Every 12 hours interval 25 1256                Subjective   Patient seen and examined at bedside.  Reports that the pain he had on urination has improved significantly during this admission.    Objective     Vital Signs:    Vitals:    25 0517 25 0757 25 0800 25 1124   BP:  (!) 99/58  113/62   BP Location:  Right upper arm  Left upper arm   Patient Position:  Lying  Sitting   Pulse: 77 82 81 88   Resp:  17     Temp:  36.7 °C (98 °F)  36.4 °C (97.5 °F)   TempSrc:  Oral  Oral   SpO2:  99%  97%   Weight:       Height:         Temp (72hrs), Av.6 °C (97.8 °F), Min:36.3 °C (97.3 °F), Max:36.9 °C (98.4 °F)      Physical Exam:  General: non toxic, alert, chronically ill-appearing  HEENT: NC/AT/ anicteric sclera  Neck: supple, no meningismus  Cardiovascular: S1/S2 present.    Respiratory: clear to auscultation  GI/Abdomen: soft, NT/ND,  no peritoneal signs  : no Will  Extremities: no clubbing, cyanosis, or edema  JOINTS:  No swelling, erythema, or pain  Lymphatics: no lymphadenopathy  Neurology: no focal deficits  Psych: cooperative with exam  Skin: no rashes      Lines, Drains, Airways, Wounds:  Peripheral IV (Adult) 25 Left Antecubital (Active)   Number of days: 3       Wound Excoriation Penis (Active)   Number of days: 3       Labs:    CBC Results         25     0441 1200 1610    WBC 4.50 5.04 3.81    RBC 2.45 2.55 2.61    HGB 8.3 8.5 8.8     HCT 26.9 27.6 28.3    .8 108.2 108.4    MCH 33.9 33.3 33.7    MCHC 30.9 30.8 31.1    PLT 82 85 91           Comment for PLT at 1610 on 05/24/25: ALL RESULTS HAVE BEEN RECHECKEDSPECIMEN QUALITY CHECKEDRESULTS OBTAINED AFTER VORTEXING TO ELIMINATE PLT CLUMPS          CMP Results         05/27/25 05/26/25 05/25/25     1003 0441 1200     142 142    K 4.1 3.8 3.9    Cl 107 110 111    CO2 22 22 23    Glucose 205 87 149    BUN 30 30 28    Creatinine 1.9 1.9 1.6    Calcium 7.7 7.9 8.1    Anion Gap 9 10 8    AST -- 24 --    ALT -- 4 --    Albumin -- 2.7 --    EGFR 32.5 32.5 39.9           Comment for EGFR at 1003 on 05/27/25: Calculation based on the Chronic Kidney Disease Epidemiology Collaboration (CKD-EPI) equation refit without adjustment for race.    Comment for EGFR at 0441 on 05/26/25: Calculation based on the Chronic Kidney Disease Epidemiology Collaboration (CKD-EPI) equation refit without adjustment for race.    Comment for EGFR at 1200 on 05/25/25: Calculation based on the Chronic Kidney Disease Epidemiology Collaboration (CKD-EPI) equation refit without adjustment for race.              MICRO:    Microbiology Results       Procedure Component Value Units Date/Time    Blood Culture Blood, Venous [[id number]]  (Normal) Collected: 05/24/25 1942    Specimen: Blood, Venous Updated: 05/26/25 2100     Culture No growth at 48 hours    Blood Culture Blood, Venous [[id number]]  (Normal) Collected: 05/24/25 1942    Specimen: Blood, Venous Updated: 05/26/25 2100     Culture No growth at 48 hours    Urine culture Urine, Clean Catch [[id number]]  (Abnormal)  (Susceptibility) Collected: 05/24/25 1620    Specimen: Urine, Clean Catch Updated: 05/27/25 0831     Urine Culture **Positive Culture**      50,000-99,000 cfu/mL Proteus mirabilis      50,000-99,000 cfu/mL Mixed Growth    Narrative:      Workup per     SARS-COV-2 (COVID-19)/ FLU A/B, AND RSV, PCR Nasopharynx [[id number]]  (Normal) Collected: 05/24/25 1557     Specimen: Nasopharyngeal Swab from Nasopharynx Updated: 05/24/25 1742     SARS-CoV-2 (COVID-19) Negative     Influenza A Negative     Influenza B Negative     Respiratory Syncytial Virus Negative    Narrative:      Testing performed using real-time PCR for detection of COVID-19. EUA approved validation studies performed on site.             Assessment  89 yo M hx CAD s/p CABG, HFrEF, A-fib not on AC 2/2 recurrent GI bleed s/p colostomy with rectal stump, recurrent left-sided pleural effusion requiring multiple thoracenteses, CKD, hypothyroidism, prior Justin's gangrene who presented to care for encephalopathy. +dysuria. C/f UTI prompted ID consultation.     ID-related problems    Sepsis 2/2 UTI-encephalopathy on admit, now improved.  RR 26 but otherwise VSS.  WBC 3.8.  UA WBC TNTC.  Proteus seems to have overgrown mixed growth noted on urine culture.  Discussed with micro on 05/27.  Appears clinically improved at this time we will continue on current therapy while remains inpatient though would likely opt to narrow to p.o. cefuroxime upon discharge.    Recommendations    Okay to c/w meropenem (please ensure this continues to be renally dosed)  while inpatient. OK to change to po cefuroxime upon DC to complete 7 total days of therapy (05/24-EOT05/30)   Needs to follow-up with urology to rule out urinary retention.  FU BCx, UCX    ID will sign off at this time.  Discussed with primary team.  Please call or page if any new microbiologic data, change in clinical condition or with any questions/concerns.    Josh Foote DO  [facility]

## 2025-05-27 NOTE — PROGRESS NOTES
Hospital Medicine     Daily Progress Note                SUBJECTIVE     Patient seen and examined at the bedside, no acute events overnight.  He was awake and alert, oriented x 3.  Comfortably sitting in chair out of bed and well saturating room air.    He had no complaints and denied lightheadedness/dizziness, chest pain, shortness of breath, abdominal pain, nausea, dysuria.   OBJECTIVE      Vital signs in last 24 hours:  Temp:  [36.3 °C (97.4 °F)-36.7 °C (98 °F)] 36.4 °C (97.5 °F)  Heart Rate:  [] 88  Resp:  [17-18] 17  BP: ()/(55-66) 113/62    Intake/Output Summary (Last 24 hours) at 5/27/2025 1435  Last data filed at 5/27/2025 1230  Gross per 24 hour   Intake 490 ml   Output 100 ml   Net 390 ml     PHYSICAL EXAMINATION      Physical Exam  Vitals and nursing note reviewed.   Constitutional:       General: He is not in acute distress.     Appearance: He is not ill-appearing.      Comments: Awake and alert, oriented x 3.  Frail, but relatively well-appearing.  Pleasant demeanor.   HENT:      Head: Normocephalic and atraumatic.      Nose: Nose normal.      Mouth/Throat:      Mouth: Mucous membranes are moist.   Cardiovascular:      Rate and Rhythm: Normal rate and regular rhythm.      Heart sounds: Murmur heard.      Comments: Grade III/VI murmur  Pulmonary:      Effort: Pulmonary effort is normal. No respiratory distress.   Abdominal:      General: Abdomen is flat. Bowel sounds are normal. There is no distension.      Palpations: Abdomen is soft.      Tenderness: There is no abdominal tenderness.   Musculoskeletal:      Right lower leg: Edema present.      Left lower leg: No edema.   Skin:     General: Skin is warm and dry.   Neurological:      Mental Status: He is alert.   Psychiatric:         Mood and Affect: Mood normal.         Behavior: Behavior normal.         Thought Content: Thought content normal.        LINES, CATHETERS, DRAINS, AIRWAYS, AND WOUNDS   Lines, Drains, and  Airways:  Wounds (agree with documentation and present on admission):  Peripheral IV (Adult) 05/24/25 Left Antecubital (Active)   Number of days: 3       Wound Excoriation Penis (Active)   Number of days: 3      LABS / IMAGING / TELE      Labs  Results from last 7 days   Lab Units 05/27/25  1003 05/26/25  0441 05/25/25  1200 05/24/25  1610   SODIUM mEQ/L 138 142 142 143   POTASSIUM mEQ/L 4.1 3.8 3.9 3.9   CHLORIDE mEQ/L 107 110* 111* 111*   CO2 mEQ/L 22 22 23 25   BUN mg/dL 30* 30* 28* 29*   CREATININE mg/dL 1.9* 1.9* 1.6* 1.9*   CALCIUM mg/dL 7.7* 7.9* 8.1* 8.4*   ALBUMIN g/dL  --  2.7*  --  3.1*   BILIRUBIN TOTAL mg/dL  --  0.9  --  0.8   ALK PHOS IU/L  --  76  --  87   ALT IU/L  --  4*  --  6*   AST IU/L  --  24  --  30   GLUCOSE mg/dL 205* 87 149* 151*     Results from last 7 days   Lab Units 05/26/25  0441 05/25/25  1200 05/24/25  1610   WBC K/uL 4.50 5.04 3.81   HEMOGLOBIN g/dL 8.3* 8.5* 8.8*   HEMATOCRIT % 26.9* 27.6* 28.3*   PLATELETS K/uL 82* 85* 91*       Imaging  I have independently reviewed the pertinent imaging from the last 24 hrs.      ASSESSMENT AND PLAN        # Acute cystitis  # Toxic metabolic encephalopathy: Resolved  Presented with altered mental status and hallucinations.  CT head without acute abnormalities.  UA w/ pyuria, LE positive, no nitrites; patient endorses dysuria.  Hx of ESBL but previously susceptible to Zosyn.  Urine culture currently growing Proteus mirabilis and mixed growth (growth of Proteus too predominant to differentiate mixed growth).    -ID following, recs appreciated  -Treat with meropenem outpatient, discharge on oral cefuroxime to complete 7 days (EOT 5/30)  -Monitor for urosepsis, although afebrile, lactate normal, no leukocytosis  - Outpatient urology follow-up for urinary retention    # CALI on CKD  Likely pre-renal azotemia in setting of acute infection/dehydration.  Random bladder scan reveals 160cc.  - creatinine: 1.9 (5/27/25) unchanged from 1.9 (5/26/25)    -  Trend CMP daily, strict I&Os  - Avoid nephrotoxic medications, meds renally dosed  - With improvement in renal function home torsemide was then held. Renal function then back to 1.9 and held again      # Chronic Macrocytic anemia  # Chronic Thrombocytopenia  Hgb stable at baseline ~8-9.  .4.  Folate WNL.  Platelets stable at 82K.  No signs of overt GIB.    -Monitor CBC      # CAD  # HFrEF  # HTN  History of CABG in 1996. No recent stress test or cath available on file.  Previously on plavix and aspirin; Plavix had been discontinued over the last several months due to recurrent GI bleeds.    -Continue aspirin 81 mg daily  -Hypotensive on admission, held home torsemide 20 mg daily then resumes, but now on hold due to worsened, but stable renal function       # Recurrent pleural effusions  CT shows signs of right sided volume overload with left > right effusions, history of multiple thoracenteses.  No signs of respiratory distress, saturating 100% on RA.    -Holding home torsemide 20 mg daily due to Cr 1.9  -Monitor weight, I/O, renal function  -Family requests repeat thoracentesis by IR prior to discharge; consulted IR for thoracentesis  - Was discussed with family that the rapidity of reaccumulation is concerning - appreciate palliative care input      # A-fib   Hx of prior atrial fibrillation.  Home regimen not on AC due to history of GI bleed  Currently rate controlled.  Follows with Dr. Roshan Jeffrey at [facility]    -Resume metoprolol  -SCDs while inpt      #Hypothyroidism  -TSH 0.87    -Continue home synthroid 100mcg        # BPH  -Resumed home finasteride and tamsulosin  -Daily bladder scans to ensure no urinary retention      # Thrombocytopenia  Chronic, stable. With macrocytic anemia and thrombocytopenia, there is c/f underlying MDS.     -Given chronicity and stability, outpatient hematology f/u is likely appropriate, and depends on goals of care.      # Debility  - Appreciate palliative care  input. Goals of care remain restorative.  - PT/OT rec home with home health and his already established 24 hours aides    VTE Assessment: [place]    VTE Prophylaxis:  Current anticoagulants:    None      Code Status: DNR (A.N.D.)      Estimated Discharge Date: 5/28/2025   Disposition Planning: Continuing medical management as above.          SOPHIA Duvall  5/27/2025     Crystal Alonso DO    50 minutes were spent with patient and or family members collecting an (interval) history, performing a pertinent physical exam and coordinating care and decision making with other healthcare providers and specialists in this complex case.

## 2025-05-27 NOTE — PLAN OF CARE
Problem: Adult Inpatient Plan of Care  Goal: Plan of Care Review  Flowsheets (Taken 5/27/2025 6063)  Progress: improving  Outcome Evaluation: PT Eval completed. Recommend d/c to home with 24hr assistance + HPT.  Plan of Care Reviewed With: patient     Problem: Mobility Impairment  Goal: Optimal Mobility [place] and Safety  Outcome: Progressing

## 2025-05-27 NOTE — HOSPITAL COURSE
"Patient interviewed and examined. Case was discussed with SIMA and I do agree with the findings and plan with additional details as below.    Subj: Today, patient seen at bedside this morning, denies acute concerns for me besides wanting something to wear with discharge .   Vitals- height is 1.854 m (6' 1\") and weight is 73.5 kg (162 lb). His oral temperature is 36.4 °C (97.5 °F). His blood pressure is 113/62 and his pulse is 88. His respiration is 17 and oxygen saturation is 97%.   Exam   - NAD, +alert, oriented to self but appears forgetful,heart RRR, lungs Clear, abdomen soft, no LE edema, Body mass index is 21.37 kg/m².    In summary, Samy Elena is a 89 y.o. 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      Plan, in brief:   #Acute Cystitis without hematuria in hx ESBL    -mixed growth predominant proteus, resistant to ciprofloxacin, minocycline, nitrofurantoin, tetracycline, intermediate to cefazolin  - transition to cephalosporin with discharge from [facility] while admitted.     #A/CKD  - 1.9 still from bsl about 1.5, suspect prerenal, trending labs, torsemide has been held thus far - cont to hold given renal function, appears roughly euvolemic at this time, now back to close to baseline range, ok to hold torsemide until sees f/u PCP on discharge, diet will affect this, recommending close f/u with PCP and checking his weights, call if weight changes.            #Afib  not new, believe paroxysmal, on SCDs, metoprolol, not on AC in hx GI B , cont to f/u o/p    Remainder/Chronic as below.   35 minutes were spent with patient, examining on day of discharge and coordinating discharge " plan.

## 2025-05-27 NOTE — PLAN OF CARE
Plan of Care Review  Plan of Care Reviewed With: patient  Progress: no change  Outcome Evaluation: Pt sitting up in bed at start of shift. Alert and oriented 2-3, is hard of hearing but able to make needs known. Pt denied any complaints other than wanting to eat breakfast. Pt agreed to be assessed but refused to take his HS medications and stated that he would let his son know that he does notwant to take any more medications. Pt offered lots of snacks. Pericare given and pt repositioned. Call button in reach and bed alarm on.

## 2025-05-27 NOTE — PROGRESS NOTES
Requested to call Ethan instead of Marko Zhu    He reports he was not sure if culture has returned and which antibiotic was selected but was aware that this was the plan to review that.        Reviewed plan based on growth of proteus and improvement symptomatically to continue merem while here and transition to cefuroxime with discharge, given thoracentesis planned prior to discharge this may be Thursday vs Friday.     He does note that the confusion is more consistent than previous and he has also been more angry which he has not been historically. He confirms that family is coordinate the resumption of home health aide full time. Palliiative care nurses have also been coming.     He also asks about the home antibiotic which has been ground in food but he did not like the taste but states this was not the methanamine and does not recall which medication it was. Reviewed that dont believe cefuroxime comes in liquid formulation.

## 2025-05-27 NOTE — PROGRESS NOTES
Reached out to son Marko 1:56 PM 05/27/25 as per request, per RN looking to get an estimate on timeline for discharge with setting up of HHA.   No answer, left voicemail that would try him again.

## 2025-05-27 NOTE — HOSPITAL COURSE
Samy is a 89 y.o. male admitted on 5/24/2025 with Lower urinary tract infectious disease [N39.0]  Altered mental status, unspecified altered mental status type [R41.82]  Pneumonia due to infectious organism, unspecified laterality, unspecified part of lung [J18.9]. Principal problem is Acute cystitis without hematuria.    Past Medical History  Samy has a past medical history of Aneurysm of internal iliac artery (CMS/[facility]), Atrial fibrillation (CMS/Formerly [facility]), CHF (congestive heart failure) (CMS/[facility]ly [facility]), 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Formerly [facility]), Coronary artery disease, Disease of thyroid gland, Will catheter in place, GI (gastrointestinal bleed), Hypertension, Myocardial infarction (CMS/[facility]), Orthostatic hypotension, and TIA (transient ischemic attack).    History of Present Illness  89 y.o. male who was admitted on 5/24/2025 after p/t ED with confusion.     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  Has a history of UTIs.  History of ESBL UTI.  Has a history of bladder outlet obstruction with an enlarged prostate.  His creatinine was elevated at 1.9.     Urinalysis with too numerous to count white cells, bacteria, trace protein, cloudy, leukocyte esterase.   HCT: (-)

## 2025-05-27 NOTE — PROGRESS NOTES
[facility]. Referral received from care coordinator. Following for home care needs. Patient open with [facility] prior to admission.  [facility] to follow up with patient. Will need to clarify who patient is seeing for primary care.  Patient's PCP is now listed as inactive.  Resumption of care to be completed after discharge. Please update with discharge plan.  Ami Soto [phone]

## 2025-05-28 NOTE — OR SURGEON
Pre-Procedure patient identification:  I am the primary operating surgeon/proceduralist and I have reviewed the applicable pathology reports and radiology studies for this procedure. I have identified the patient on 05/28/25 at 8:20 AM SOPHIA Peraza

## 2025-05-28 NOTE — POST-PROCEDURE NOTE
Interventional Radiology Brief Postprocedure Note    Samy Elena     Attending: SOPHIA Peraza / Gigi Berry MD     Assistant: Chirag    Diagnosis: SOB    Description of procedure: US guided L thora    Contrast: none     Anesthesia:  Local (Lidocaine)    Volume of Lidocaine Utilized (ml): 10     Medications: none     Complications: None      Estimated Blood Loss: Estimated Blood Loss: 0-10 ml    Anticoagulation: n/a    Specimens: 800 mL hazy yellow pleural fluid    Findings: Successful US guided L thora with 800 mL pleural fluid removed and discarded. Pt luisa well. Vss. Cxr pending.     5/28/2025 8:43 AM

## 2025-05-28 NOTE — PLAN OF CARE
Problem: Adult Inpatient Plan of Care  Goal: Plan of Care Review  Outcome: Progressing  Flowsheets (Taken 5/28/2025 0546)  Progress: declining  Outcome Evaluation: pt anxious overnight. incontinent care provided. pt c/o acid reflux, prn annette adminstered without relief. approx 3am, pt continue to c/o of acid reflux, and pt vomited brown liquid. pt more anxious, pt requested to talk to the , family, and MD about palliative/hopsice care. MD ordered IV protonix and IM tigan. prn clonazepam given. urinal and call bell within reach. VSS. will continue to monitor  Plan of Care Reviewed With: patient  Goal: Patient-Specific Goal (Individualized)  Outcome: Progressing  Goal: Absence of Hospital-Acquired Illness or Injury  Outcome: Progressing  Goal: Optimal Comfort and Wellbeing  Outcome: Progressing  Goal: Readiness for Transition of Care  Outcome: Progressing     Problem: Infection  Goal: Absence of Infection Signs and Symptoms  Outcome: Progressing   Plan of Care Review  Plan of Care Reviewed With: patient  Progress: declining  Outcome Evaluation: pt anxious overnight. incontinent care provided. pt c/o acid reflux, prn annette adminstered without relief. approx 3am, pt continue to c/o of acid reflux, and pt vomited brown liquid. pt more anxious, pt requested to talk to the , family, and MD about palliative/hopsice care. MD ordered IV protonix and IM tigan. prn clonazepam given. urinal and call bell within reach. VSS. will continue to monitor

## 2025-05-28 NOTE — DISCHARGE SUMMARY
"   Hospital Medicine    Inpatient Discharge Summary        BRIEF OVERVIEW     Patient: Samy Elena  Admission Date: 2025   : 10/6/1930 Discharge Date: 2025       PCP: Zachery Hernandez MD  Disposition: Home     Destination:       Attending Provider: Crystal Alonso DO Attending phys phone: [phone]    Code Status At Discharge: DNR (A.N.D.)    Samy Elena was seen by the palliative care team during this hospitalization.  Please see palliative care team documentation for further details.  Also reference the advance care planning (ACP) website (Advance Care Planning (Advance Directive)  Patient services  [facility] ) to learn more about completing an advance directive, including Health Care Power of  and Living Will documents.      From H&P:  Samy Elena is a 89 y.o. 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  His caregiver reports that he has been hallucinating, talking to people who are not present, and \"not acting like himself\", which is new and abnormal for him. She also notes dark urine and is concerned about a UTI. On evaluation, he is A&O x3, vitals stable. Urinalysis reveals gross pyuria and positive LE, urine culture is pending. The patient also reports dysuria and has mild RLQ and suprapubic tenderness. Given the history of recurrent left pleural effusions, a CT chest/abd/pelvis was performed, which showed bilateral pleural effusions (L > R), left basilar consolidation, and cardiomegaly with IVC dilation, suggestive right sided HF  Labs are notable for creatinine 1.9 (baseline 1.5), Hgb 8.8 (chronic), platelets 91K, macrocytosis, " and stable lactate. Blood and urine cultures were sent. Zosyn was started given history of ESBL UTI (previously susceptible to pip tazo).  Patient's son at bedside, confirms DNR status and that patient is not at baseline cognitively.        Per outpt cardiology note 5/20/25- pts BP normally runs low- 95/52 at that time        Medications   clonazePAM (klonoPIN) tablet 0.5 mg (0.5 mg oral Given 5/24/25 1732)   iopamidoL (ISOVUE-370) 370 mg iodine /mL (76 %) injection 100 mL (100 mL intravenous Given 5/24/25 1839)   piperacillin-tazobactam (ZOSYN) 4.5 g/100 mL IVPB in NSS (0 g intravenous Stopped 5/24/25 2016)   lactated ringer's bolus 500 mL (0 mL intravenous Stopped 5/24/25 2114)      SBP , DBP 50-60, HR 61-74  Wbc 3.81, hgb 8.8 (8.2 4/4) mcv 108, plts 91  UA RBC, wbc/bacteria/+LE, negative nitrites  Urine and blood cultures in process  Cr 1.9 (1.5 4/4/25) baseline about 1.5 , bun 29  Lactate 1.6      Brief Hospital Course  Samy Elena is a 89 y.o. year-old male with PMHx CAD s/p CABG on aspirin monotherapy, CHF, torrential TR, A-fib, thrombocytopenia, recurrent GIB, recurrent pleural effusions, CKD and hypothyroidism admitted on 5/24/2025 with toxic metabolic encephalopathy and CALI secondary to acute cystitis.  On admission his UA was positive and he had complaints of dysuria with right lower quadrant tenderness.  He was afebrile, without leukocytosis, had a normal lactate and no signs of sepsis.  His creatinine was elevated to 1.9 from baseline of 1.3-1.5 and his home torsemide was held.  A CT head was negative for acute abnormalities.  Given his history of ESBL, he was started on meropenem and infectious disease was consulted.  He responded well to the treatment and his urine culture grew out Proteus mirabilis and mixed growth, however Proteus growth too overwhelming to speciate out mixed growth.  He was discharged on oral cefuroxime to complete a 7-day course.  At discharge his creatinine was  improving but still elevated above his baseline.  He was instructed to obtain an outpatient BMP within 1 week of discharge and to continue holding his home torsemide until his creatinine returned to baseline and he could follow-up with an outpatient provider.    His admission chest x-ray and CT chest showed bilateral pleural effusions, left greater than right.  He underwent left-sided IR thoracentesis on 5/28 with removal of 800 mL.  He was never hypoxic and remained well saturated on room air throughout his hospitalization.    He was noted to have stable thrombocytopenia without signs of bleeding throughout his hospitalization.    He was discharged home where he has 24-hour home care in place.    Please see assessment/plan as well as complete electronic medical record for further details of hospital course.       ASSESSMENT AND PLAN     # Toxic metabolic encephalopathy, resolved in Acute Cystitis  Initially presented with altered mental status and hallucinations, unremarkable CTH for acute findings.  UA w/ pyuria, LE positive, no nitrites; patient endorses dysuria.  No evidence of sepsis, afebrile, lactate normal, no leukocytosis.  Hx of ESBL previously susceptible to Zosyn.  Urine culture grew Proteus mirabilis >>> mixed growth (Proteus too predominant to differentiate mixed growth).    - ID following, recs appreciated. Treated with meropenem inpatient, discharged on oral cefuroxime to complete 7 days - family noted prefers liquids but not easily an option here.  - Outpatient urology follow-up for urinary retention       # CALI on CKD  Likely pre-renal azotemia in setting of acute infection/dehydration.  Random bladder [id number].  - creatinine: 1.7 (5/28/25) decreased from 1.9 (5/27/25)  Baseline Cr 1.5.    - Avoid nephrotoxic medications, meds renally dosed  - Continue to hold torsemide until renal function back to baseline  - Outpatient BMP in 1 week from discharge       # Chronic Macrocytic anemia  # Chronic  Thrombocytopenia  Hgb has been  stable at baseline ~8-9.  .4.  Folate WNL.  Platelets stable at 83K.  No signs of overt GIB.    - Continue outpatient follow-up      # CAD  # HFrEF  # HTN  History of CABG in 1996. No recent stress test or cath available on file.  Previously on plavix and aspirin; Plavix had been discontinued over the last several months due to recurrent GI bleeds.    - Continue aspirin 81 mg daily  - Hypotensive on admission, held home torsemide 20 mg daily then resumed, but now on hold due to CALI.  Continue to hold torsemide at discharge until renal function back to baseline       # Recurrent pleural effusions  CT shows signs of right sided volume overload with left > right effusions, history of multiple thoracenteses.  No signs of respiratory distress, saturating 100% on RA.     - Holding home torsemide 20 mg daily due to Cr 1.9, now improved to 1.7.  Continue to hold until back to baseline and instructed to get outpatient BMP in 1 week from discharge  - Monitor weight, I/O, renal function  - S/p successful thoracentesis pre-discharge 05/28/25 with about 800ml out, very well tolerated   - Rapidity of reaccumulation is concerning - appreciate palliative care input       # A-fib   Hx of prior atrial fibrillation.  Home regimen not on AC due to history of GI bleed.  Currently rate controlled.  Follows with Dr. Roshan Jeffrey at [facility].    - Resume metoprolol  - SCDs while inpatient       #Hypothyroidism  TSH 0.87.    -Continue home synthroid 100mcg        # BPH  - Resumed home finasteride and tamsulosin  - Daily bladder scans while inpatient  - Outpatient follow-up with urology       # Thrombocytopenia  Chronic, stable. With macrocytic anemia and thrombocytopenia, there is c/f underlying MDS.   No evidence of bleeding.    - Given chronicity and stability, outpatient hematology f/u is likely appropriate, and depends on goals of care.       # Debility  - Appreciate palliative care  input. Goals of care remain restorative.  - PT/OT rec home with home health and his already established 24 hours aides       Exam on Day of Discharge  Temp:  [36.3 °C (97.3 °F)-36.7 °C (98.1 °F)] 36.4 °C (97.5 °F)  Heart Rate:  [60-81] 74  Resp:  [16-20] 18  BP: ()/(54-67) 106/56      Physical Exam  Vitals and nursing note reviewed.   Constitutional:       General: He is not in acute distress.     Comments: Awake and alert, oriented.  Frail, but relatively well-appearing.  Pleasant demeanor.   HENT:      Head: Normocephalic and atraumatic.      Nose: Nose normal.      Mouth/Throat:      Mouth: Mucous membranes are moist.   Cardiovascular:      Rate and Rhythm: Normal rate and regular rhythm.   Pulmonary:      Effort: Pulmonary effort is normal. No respiratory distress.      Breath sounds: Normal breath sounds.   Abdominal:      General: Bowel sounds are normal. There is no distension.      Palpations: Abdomen is soft.      Tenderness: There is no abdominal tenderness.   Musculoskeletal:      Right lower leg: No edema.      Left lower leg: No edema.   Skin:     General: Skin is warm and dry.   Neurological:      Mental Status: He is alert.   Psychiatric:         Mood and Affect: Mood normal.         Behavior: Behavior normal.         Thought Content: Thought content normal.         DISCHARGE MEDICATIONS         Medication List        PAUSE taking these medications      torsemide 10 mg tablet  Wait to take this until your doctor or other care provider tells you to start again.  Commonly known as: DEMADEX  Take 20 mg by mouth 2 (two) times a day. 9 am  Dose: 20 mg            START taking these medications      cefUROXime 250 mg tablet  Commonly known as: CEFTIN  Start taking on: May 30, 2025  Take 1 tablet (250 mg total) by mouth 2 (two) times a day for 3 days.  Dose: 250 mg            CONTINUE taking these medications      aspirin 81 mg enteric coated tablet  Take 81 mg by mouth daily. 9 am  Dose: 81 mg      calcium (as carbonate) 200 mg calcium (500 mg) chewable tablet  Commonly known as: TUMS  Take 1 tablet by mouth daily. 11 am  Dose: 1 tablet     carboxymethylcellulose 0.5 % dropperette  Commonly known as: REFRESH PLUS  Administer 1 drop into both eyes every2 (two) hours while awake.  Dose: 1 drop     clonazePAM 0.5 mg tablet  Commonly known as: KlonoPIN  Take 1 tablet (0.5 mg total) by mouth nightly as needed for anxiety.  Dose: 0.5 mg     cranberry extract 425 mg capsule  Take 425 mg by mouth daily. 5 pm  Dose: 425 mg     cyanocobalamin 500 mcg tablet  Commonly known as: VITAMIN B12  Take 500 mcg by mouth every morning. 9 am  Dose: 500 mcg     ferrous sulfate 325 mg (65 mg iron) tablet  Take 65 mg by mouth daily with breakfast. 11 am  Dose: 65 mg     finasteride 5 mg tablet  Commonly known as: PROSCAR  Take 5 mg by mouth daily. 9 am  Dose: 5 mg     levothyroxine 100 mcg tablet  Commonly known as: SYNTHROID  Take 100 mcg by mouth daily before breakfast. 7 am  Dose: 100 mcg     LUMIGAN 0.01 % ophthalmic drops  Administer 1 drop into the left eye nightly.  Dose: 1 drop  Generic drug: bimatoprost     melatonin 10 mg capsule  Take 10 mg by mouth nightly. 9 pm  Dose: 10 mg     methenamine 1 gram tablet  Commonly known as: HIPREX  Take 1 g by mouth daily before lunch. 11 am  Dose: 1 g     metoprolol succinate XL 25 mg 24 hr tablet  Commonly known as: TOPROL-XL  Take 12.5 mg by mouth daily.  Dose: 12.5 mg     omeprazole 20 mg capsule  Commonly known as: PriLOSEC  Take 20 mg by mouth daily. 11 am  Dose: 20 mg     potassium chloride 20 mEq packet  Take 10 mEq by mouth 2 (two) times a day. 10 meq bid  Dose: 10 mEq     tamsulosin 0.4 mg capsule  Commonly known as: FLOMAX  Take 0.4 mg by mouth every morning. 9 am  Dose: 0.4 mg     thiamine HCl 100 mg tablet  Commonly known as: VITAMIN B1  Take 100 mg by mouth every morning. 9 am  Dose: 100 mg            ASK your doctor about these medications      buPROPion  mg 24 hr  "tablet  Commonly known as: WELLBUTRIN XL  Take 150 mg by mouth daily.  Dose: 150 mg             INSTRUCTIONS AND FOLLOW-UP     Instructions for after discharge       Call provider for:  difficulty breathing      Call provider for:  extreme fatigue      Call provider for:  persistent dizziness or light-headedness, headache, or visual disturbances      Call provider for:  persistent nausea or vomiting      Call provider for:  severe uncontrolled pain      Call provider for:  temperature >100.4      Discharge Diet      Diet Type / Texture:  Regular  Cardiac (Low Sodium, Low Fat)       Fluid restriction dietary / 24h: 2000 mL Fluid    Fluid Restriction      Fluid restriction per day: 2000 mL Fluid    Normal Activity as Tolerated      Review additional discharge directions in \"Instructions\" section on the last page              Important Issues to Address in Follow-Up  Discharge Follow-up Issues: Instructions to Patient:   Additional Discharge Instructions    Samy Elena was admitted and treated for a urinary tract infection and associated encephalopathy, or change in mental status.  Seen in consultation with infectious disease.  Samy was treated with intravenous antibiotics while in the hospital.  His urine culture grew a bacteria called Proteus mirabilis which is sensitive to these antibiotics.  He responded well to treatment, is no longer having urinary symptoms and is back to his baseline mental status.  Samy is being discharged with a prescription for cefuroxime (Ceftin) 250 mg twice daily, starting 5/30/25 through 6/2/25, to complete a 7-day course.  It is incredibly important to finish the full course of antibiotics without missing any doses.    Samy also had an acute kidney injury due to his urinary tract infection.  Currently his creatinine, a compound in the blood used to measure kidney function, is still elevated above his baseline but this is improving.  Patient was to get an outpatient basic " metabolic panel within a week of discharge to follow-up on his kidney function.  Until his kidney function has returned to his baseline (creatinine of 1.1) he should hold off on taking his diuretic torsemide.  Samy can discuss when to resume his torsemide with his PCP, or prescribing provider.  To help support his fluid volume status, it is important that Samy continue to eat a cardiovascular healthy diet low in salt and fat and continue to restrict fluid intake to no more than 2 liters (a large soda bottle), approximately 64 ounces, per day including all drinks.    Samy had thoracentesis of the left lung with interventional radiology on 5/28/25 that removed 800 mL of fluid.  He tolerated the procedure well and can continue following up with his outpatient providers for recurrent pleural effusions.    Samy and his caregivers can also review his electronic medical record for further details of imaging, tests and lab results.      Medications changes:  Please see your medication list for updated doses, new, and discontinued medications and continue to take any new/updated medications as prescribed at discharge. If you have any questions regarding your home medications please discuss with your primary care provider.         Recommended outpatient appointments:  Follow-up with urology for urinary retention.  Please call the office to schedule an appointment with any available provider.    Recommended outpatient labs: Please get a basic metabolic panel within 1 week of discharge to follow up on creatinine and kidney function.  Samy can obtain this lab work through his primary care office.      Samy should continue to take all his other home medications as prescribed and follow up with his PCP within 1 week.    Thank you for letting us with the Division of Hospital Medicine and the entire team at [facility] take care of Samy.            Scheduled Appointments            In 1 week HEIDI Machado  [facility]            Recommended Follow-up Visits   Follow-Up Providers       Zachery Hernandez MD   Specialty: Internal Medicine, Primary Care   Relationship: PCP - General        Go in 1 week(s)    Service: Follow-up with your PCP within 1 week of discharge to discuss your hospital stay.    Maximino Martinez MD   Specialty: Urology        Schedule an appointment as soon as possible for a visit    Service: Follow-up with urology for urinary retention.  Call the office to schedule an appointment with any available provider.              After Discharge Care                Home Medical Care       [facility]   Home Health Services, Home Nursing                                Test Results Pending at Discharge        LABS AND IMAGING   Pertinent Labs  CHEMISTRIES   Results from last 7 days   Lab Units 05/28/25  0607 05/27/25  1003 05/26/25  0441 05/25/25  1200 05/25/25  1200 05/24/25  1610   SODIUM mEQ/L 138 138 142  --  142 143   POTASSIUM mEQ/L 4.4 4.1 3.8  --  3.9 3.9   CHLORIDE mEQ/L 109* 107 110*  --  111* 111*   CO2 mEQ/L 22 22 22  --  23 25   BUN mg/dL 30* 30* 30*  --  28* 29*   CREATININE mg/dL 1.7* 1.9* 1.9*  --  1.6* 1.9*   GLUCOSE mg/dL 136* 205* 87  --  149* 151*   CALCIUM mg/dL 8.0* 7.7* 7.9*  --  8.1* 8.4*   EGFR mL/min/1.73m*2 37.1* 32.5* 32.5*  --  39.9* 32.5*   ANION GAP mEQ/L 7 9 10  --  8 7   MAGNESIUM mg/dL 1.8 1.8 1.8   < >  --   --     < > = values in this interval not displayed.     HEPATIC FUNCTION TESTS   Results from last 7 days   Lab Units 05/26/25  0441 05/24/25  1610   AST IU/L 24 30   ALT IU/L 4* 6*   ALK PHOS IU/L 76 87   ALBUMIN g/dL 2.7* 3.1*   PROTEIN TOTAL g/dL 6.6 7.7   BILIRUBIN TOTAL mg/dL 0.9 0.8     CBC RESULTS   Results from last 7 days   Lab Units 05/28/25  0607 05/26/25  0441 05/25/25  1200 05/24/25  1610   WBC K/uL 4.52 4.50 5.04 3.81   HEMOGLOBIN g/dL 8.1* 8.3* 8.5* 8.8*   HEMATOCRIT % 26.1* 26.9* 27.6* 28.3*   PLATELETS K/uL  83* 82* 85* 91*     ANEMIA STUDIES   Results from last 7 days   Lab Units 05/24/25  1608   FOLATE ng/mL 16.6   VITAMIN B 12 pg/mL 823     COAGULATION   Results from last 7 days   Lab Units 05/28/25  0607 05/24/25  1608   PROTIME sec 19.6* 17.5*   INR  1.7 1.5     ENDOCRINE STUDIES   Lab Results   Component Value Date    TSH 0.87 05/24/2025    FREET4 1.08 07/28/2024    HGBA1C 6.4 (H) 08/03/2023     MICROBIOLOGY   Microbiology Results       Procedure Component Value Units Date/Time    Blood Culture Blood, Venous [[id number]]  (Normal) Collected: 05/24/25 1942    Specimen: Blood, Venous Updated: 05/28/25 2100     Culture No growth at 96 hours    Blood Culture Blood, Venous [[id number]]  (Normal) Collected: 05/24/25 1942    Specimen: Blood, Venous Updated: 05/28/25 2100     Culture No growth at 96 hours    Urine culture Urine, Clean Catch [[id number]]  (Abnormal)  (Susceptibility) Collected: 05/24/25 1620    Specimen: Urine, Clean Catch Updated: 05/27/25 0831     Urine Culture **Positive Culture**      50,000-99,000 cfu/mL Proteus mirabilis      50,000-99,000 cfu/mL Mixed Growth    Narrative:      Workup per     SARS-COV-2 (COVID-19)/ FLU A/B, AND RSV, PCR Nasopharynx [[id number]]  (Normal) Collected: 05/24/25 1552    Specimen: Nasopharyngeal Swab from Nasopharynx Updated: 05/24/25 1741     SARS-CoV-2 (COVID-19) Negative     Influenza A Negative     Influenza B Negative     Respiratory Syncytial Virus Negative    Narrative:      Testing performed using real-time PCR for detection of COVID-19. EUA approved validation studies performed on site.             Pertinent Imaging  IR THORACENTESIS  Result Date: 5/28/2025  IMPRESSION: Successful ultrasound-guided left-sided thoracentesis with 800 mL pleural fluid removed and discarded. I certify that I have personally reviewed this examination and agree with Avril Loya's report. Gigi Berry M.D.    X-RAY CHEST 1 VIEW  Result Date: 5/28/2025  IMPRESSION: No evidence of  pneumothorax..     CT HEAD WITHOUT IV CONTRAST  Result Date: 5/25/2025  IMPRESSION: No evidence of hemorrhage, mass or acute territorial infarction by CT criteria. COMMENT: Technique: Computed tomography of the brain was performed utilizing contiguous 2.5 mm transaxial sections without intravenous contrast administration. CT DOSE:  One or more dose reduction techniques (e.g. automated exposure control, adjustment of the mA and/or kV according to patient size, use of iterative reconstruction technique) utilized for this examination. Comparison studies: Head CT 2/16/2025. Postsurgical change: None. Brain parenchyma: There is no intraparenchymal hemorrhage, mass effect, midline shift or extra-axial fluid collection. White matter changes: Normal for age Ventricles, cisterns, and sulci: Normal in size and configuration. Sella: Normal in appearance. Cerebellar tonsils: Normal. Calvarium and extra cranial soft tissues: Normal. Paranasal sinuses: Clear bilaterally. Mastoid air cell: Normal. Orbits: Normal.     CT CHEST/ABDOMEN/PELVIS WITH IV CONTRAST  Result Date: 5/24/2025  IMPRESSION: 1.  Bilateral pleural effusions, left greater than right with associated consolidation. 2.  Cardiomegaly with enlarged inferior vena cava and findings suggestive of right-sided heart failure. 3. 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     X-RAY CHEST 1 VIEW  Result Date: 5/24/2025  IMPRESSION: No interval change in appearance of the chest.  There is cardiomegaly and a large pleural parenchymal process in the left lung.    IR THORACENTESIS  Result Date: 5/22/2025  IMPRESSION: Successful ultrasound-guided left-sided thoracentesis with 750 mL pleural fluid removed and discarded. I certify that I have personally reviewed this examination and agree with Avril Loya's report. Gigi Berry M.D.    X-RAY CHEST 1 VIEW  Result Date: 5/22/2025  IMPRESSION: Small right and moderate left pleural effusions.  No pneumothorax  seen.    Cardiac Imaging    TRANSTHORACIC ECHO (TTE) COMPLETE 09/24/2024    Interpretation Summary  Technically difficult study.    The left ventricular cavity size is small with mild left ventricular hypertrophy and mildly reduced systolic function. Estimated EF 40-45% by visual estimate. Abnormal septal motion due to RV volume overload. Unable to assess diastolic function due to technically difficult study. Low stroke volume (32 ml).    The aortic valve is tricuspid. Aortic valve and root sclerosis. Mild aortic regurgitation.    The mitral valve is thickened. Mild mitral annular calcification. Trace regurgitation.    The left atrium is normal in size.    The right ventricular cavity is massively dilated with severely decreased systolic function. Apical hypercontractility.    The pulmonic valve is not well seen. There is trace pulmonic regurgitation.    There is a 13 mm coaptation gap between the leaflets of the tricuspid valve resulting in torrential ( wide -open) tricuspid regurgitation (PISA EROA 2.4cm2, Rvol 96 mL). Tricuspid valve annulus is dilated and measures 6.8 cm in diameter. Unable to assess RVSP in the setting of torrential tricuspid regurgitation.    The right atrium is severely dilated.    The IVC is massively dilated and collapses less than 50% with inspiration consistent with severely elevated right atrial pressure.    No pericardial effusion. Large pleural effusion.    Compared to previous echocardiogram from 3/28/2024, no significant change.      OTHER SERVICES PROVIDED   Consults During Admission  IP CONSULT TO PAIN/PALLIATIVE CARE  IP CONSULT TO INFECTIOUS DISEASE    Procedures Performed  None      Thank you for allowing the Division of Hospital Medicine to care for your patient.        >30 mins spent for coordination/counselling related to discharge plan, including final examination of patient, discussion regarding discharge instructions, reconciliation/prescription of medications, preparation  of discharge records, etc.

## 2025-05-28 NOTE — PROGRESS NOTES
Hospital Medicine     Daily Progress Note                SUBJECTIVE     Patient seen and examined at the bedside, he asks if we have updated his sons, which we have. He was using ice on his left arm for an area of soreness but now is cold and would like it removed.  He denies additional site of pain.   We reviewed plan of care, he feels very well post thoracentesis, aware that several hundred ml were drained, no postprocedure pain, agreeable to plan of discharge (as long as aide is re-established to care for him).    OBJECTIVE      Vital signs in last 24 hours:  Temp:  [36.5 °C (97.7 °F)-36.7 °C (98 °F)] 36.6 °C (97.9 °F)  Heart Rate:  [56-83] 62  Resp:  [16-24] 18  BP: ()/(53-70) 116/57    Intake/Output Summary (Last 24 hours) at 5/28/2025 1344  Last data filed at 5/28/2025 0838  Gross per 24 hour   Intake 310 ml   Output 1050 ml   Net -740 ml     PHYSICAL EXAMINATION           General: He is not in acute distress.     Appearance: He is not ill-appearing.      Comments: Awake and alert, oriented x 3.  Frail, but relatively well-appearing.  Pleasant demeanor.   HENT:      Head: Normocephalic and atraumatic.      Nose: Nose normal.      Mouth/Throat:      Mouth: Mucous membranes are moist.   Cardiovascular:      Rate and Rhythm: Normal rate and regular rhythm.      Heart sounds: Murmur heard.      Comments: Grade III/VI murmur  Pulmonary:      Effort: Pulmonary effort is normal. No respiratory distress.   Abdominal:      General: Abdomen is flat. Bowel sounds are normal. There is no distension.      Palpations: Abdomen is soft.      Tenderness: There is no abdominal tenderness.   Musculoskeletal:      Right lower leg: Edema present.      Left lower leg: No edema.   Skin:     General: Skin is warm and dry.   Neurological:      Mental Status: He is alert.   Psychiatric:         Mood and Affect: Mood normal.         Behavior: Behavior normal.         Thought Content: Thought content normal.    LINES,  CATHETERS, DRAINS, AIRWAYS, AND WOUNDS   Lines, Drains, and Airways:  Wounds (agree with documentation and present on admission):  Peripheral IV (Adult) 05/24/25 Left Antecubital (Active)   Number of days: 3       Wound Excoriation Penis (Active)   Number of days: 3      LABS / IMAGING / TELE      Labs, reviewed     Imaging  I have independently reviewed the pertinent imaging from the last 24 hrs.      ASSESSMENT AND PLAN   In summary, Samy Elena is a 89 y.o. 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         # Toxic metabolic encephalopathy, resolved in Acute Cystitis  --Initially Presented with altered mental status and hallucinations, unremarkable CTH for acute findings.  -UA w/ pyuria, LE positive, no nitrites; patient endorses dysuria.  -Hx of ESBL previously susceptible to Zosyn.  Urine culture--Proteus mirabilis >>>mixed growth (Proteus too predominant to differentiate mixed growth).  -ID recs with thanks; Treat with meropenem INpatient, discharge on oral cefuroxime to complete 7 days (EOT 5/30) - family noted prefers liquids but not easily an option here.   -Monitor for urosepsis, although afebrile, lactate normal, no leukocytosis  - Outpatient urology follow-up for urinary retention    # CALI on CKD, Likely pre-renal azotemia in setting of acute infection/dehydration.  Random bladder [id number].  - creatinine: 1.9 (5/27/25) unchanged from 1.9 (5/26/25)  - Trend CMP daily, strict I&Os  - Avoid nephrotoxic medications, meds renally dosed  - With improvement in renal function home torsemide was then held. Improved to Cr 1.7 which is near baseline about 1.5-1.6    # Chronic  Macrocytic anemia  # Chronic Thrombocytopenia  Hgb has been  stable at baseline ~8-9.  .4.  Folate WNL.  Platelets stable at 82K.  No signs of overt GIB.  -Monitor CBC    # CAD  # HFrEF  # HTN  History of CABG in 1996. No recent stress test or cath available on file.  Previously on plavix and aspirin; Plavix had been discontinued over the last several months due to recurrent GI bleeds  -Continue aspirin 81 mg daily  -Hypotensive on admission, held home torsemide 20 mg daily then resumes, but now on hold due to worsened, but stable renal function    # Recurrent pleural effusions  CT shows signs of right sided volume overload with left > right effusions, history of multiple thoracenteses.  No signs of respiratory distress, saturating 100% on RA.    -Holding home torsemide 20 mg daily due to Cr 1.9, now improved to near baseline 1.7, likely resume torsemide with discharge at 20mg as per PTA.   -Monitor weight, I/O, renal function  -S/p successful thoracentesis pre-discharge 05/28/25 with about 800ml out, very well tolerated   -   rapidity of reaccumulation is concerning - appreciate palliative care input    # A-fib   Hx of prior atrial fibrillation.  Home regimen not on AC due to history of GI bleed  Currently rate controlled.  Follows with Dr. Roshan Jeffrey at [facility]  -Resume metoprolol  -SCDs while inpt    #Hypothyroidism  -TSH 0.87  -Continue home synthroid 100mcg      # BPH  -Resumed home finasteride and tamsulosin  -Daily bladder scans to ensure no urinary retention    # Thrombocytopenia  Chronic, stable. With macrocytic anemia and thrombocytopenia, there is c/f underlying MDS.   -Given chronicity and stability, outpatient hematology f/u is likely appropriate, and depends on goals of care.    # Debility  - Appreciate palliative care input. Goals of care remain restorative.  - PT/OT rec home with home health and his already established 24 hours aides    VTE Assessment: [place]    VTE Prophylaxis:   Current anticoagulants:    None      Code Status: DNR (A.N.D.)      Estimated Discharge Date: 5/29/2025   Disposition Planning: Continuing medical management as above.          Crystal Alonso DO  5/28/2025     Crystal Alonso DO    50 minutes were spent with patient and or family members collecting an (interval) history, performing a pertinent physical exam and coordinating care and decision making with other healthcare providers and specialists in this complex case.

## 2025-05-28 NOTE — PLAN OF CARE
Plan of Care Review  Plan of Care Reviewed With: patient  Progress: improving  Outcome Evaluation: Pt received in bed, AAOx2-3, confused at times, [place]. VSS. Scheduled medication administered. Colostomy emptied throughout shift, pt incontinent of urine. Repositioned in bed with x2 assist. C/o pain in the left arm, ice applied. Afib/an on the monitor. Waffle boots in place. Pt went to IR for thoracentesis this morning. FSP in place. Call bell within reach.

## 2025-05-29 NOTE — PLAN OF CARE
Care Coordination Discharge Plan Summary    Admission Assessment Summary    General Information  Readmission Within the last 30 days: no previous admission in last 30 days  Does patient have a :    Patient-Specific Goals (include timeframe):      Living Arrangements  Arrived From: home  Current Living Arrangements: home  People in Home: other (see comments) (24-hour HHA)  Home Accessibility: ramp to enter home  Living Arrangement Comments: ramp to enter home, stays on first floor, where there is a powder room    Social Drivers of Health - Screenings  Housing Stability  In the last 12 months, was there a time when you were not able to pay the mortgage or rent on time?: No  At any time in the past 12 months, were you homeless or living in a shelter (including now)?: No  Utility Access  In the past 12 months has the electric, gas, oil, or water company threatened to shut off services in your home?: No  Transportation Needs  In the past 12 months, has lack of transportation kept you from medical appointments or from getting medications?: No  In the past 12 months, has lack of transportation kept you from meetings, work, or from getting things needed for daily living?: No    Functional Status Prior to Admission  Assistive Device/Animal Currently Used at Home: hospital bed, walker, front-wheeled, wheelchair, ramps  Functional Status Comments:    IADL Comments:      Discharge Needs Assessment    Concerns to be Addressed: discharge planning  Current Discharge Risk: chronically ill  Anticipated Changes Related to Illness: inability to care for self    Discharge Plan Summary    Patient Choice  Offered/Gave Vendor List: yes  Patient and/or patient guardian/advocate was made aware of their right to choose a provider. A list of eligible providers was presented and reviewed with the patient and/or patient guardian/advocate in written and/or verbal form. The list delineates providers in the patient’s desired  geographic area who are participating in the Medicare program and/or providers contracted with the patient’s primary insurance. The Medicare list and quality ratings were obtained from the Medicare.gov [medicare.gov] website.    Concerns / Comments: Patient discussed in DCP. He is for d/c home today to resume services with [facility] and his 24-hour HHA. [facility] set for 1900 (requested 1630, this was earliest available), trip #[id number]. Son Ethan aware and amenable, states HHA will be in place. He is in agreement with plan. Jeffery Zhu gave verbal consent for IMM.    Discharge Plan:  Disposition/Destination:   /         Connection to Community     Community Resources:      Discharge Transportation:  Is Out of Hospital DNR needed at Discharge:    Does patient need discharge transport? Yes  Discharge Transportation Vendor: [employer] ([phone])  Type of Transportation: basic life support  What day is the transport expected?: 05/29/25  What time is the transport expected?: 1900

## 2025-05-29 NOTE — DISCHARGE INSTRUCTIONS
Samy Elena was admitted and treated for a urinary tract infection and associated encephalopathy, or change in mental status.  Seen in consultation with infectious disease.  Samy was treated with intravenous antibiotics while in the hospital.  His urine culture grew a bacteria called Proteus mirabilis which is sensitive to these antibiotics.  He responded well to treatment, is no longer having urinary symptoms and is back to his baseline mental status.  Samy is being discharged with a prescription for cefuroxime (Ceftin) 250 mg twice daily, starting 5/30/25 through 6/2/25, to complete a 7-day course.  It is incredibly important to finish the full course of antibiotics without missing any doses.    Samy also had an acute kidney injury due to his urinary tract infection.  Currently his creatinine, a compound in the blood used to measure kidney function, is still elevated above his baseline but this is improving.  Patient was to get an outpatient basic metabolic panel within a week of discharge to follow-up on his kidney function.  Until his kidney function has returned to his baseline (creatinine of 1.1) he should hold off on taking his diuretic torsemide.  Samy can discuss when to resume his torsemide with his PCP, or prescribing provider.  To help support his fluid volume status, it is important that Samy continue to eat a cardiovascular healthy diet low in salt and fat and continue to restrict fluid intake to no more than 2 liters (a large soda bottle), approximately 64 ounces, per day including all drinks.    Samy had thoracentesis of the left lung with interventional radiology on 5/28/25 that removed 800 mL of fluid.  He tolerated the procedure well and can continue following up with his outpatient providers for recurrent pleural effusions.    Samy and his caregivers can also review his electronic medical record for further details of imaging, tests and lab results.      Medications  changes:  Please see your medication list for updated doses, new, and discontinued medications and continue to take any new/updated medications as prescribed at discharge. If you have any questions regarding your home medications please discuss with your primary care provider.         Recommended outpatient appointments:  Follow-up with urology for urinary retention.  Please call the office to schedule an appointment with any available provider.    Recommended outpatient labs: Please get a basic metabolic panel within 1 week of discharge to follow up on creatinine and kidney function.  Samy can obtain this lab work through his primary care office.      Samy should continue to take all his other home medications as prescribed and follow up with his PCP within 1 week.    Thank you for letting us with the Division of Hospital Medicine and the entire team at [facility] take care of Samy.

## 2025-05-29 NOTE — PLAN OF CARE
Problem: Adult Inpatient Plan of Care  Goal: Plan of Care Review  Outcome: Progressing  Flowsheets (Taken 5/29/2025 3069)  Progress: improving  Outcome Evaluation: pt anxious and confused overnight. pt incontinent of urine, pt cleaned and changed assist x 2. bed alarm activated. call bell within reach. will continue to monitor  Plan of Care Reviewed With: patient  Goal: Patient-Specific Goal (Individualized)  Outcome: Progressing  Goal: Absence of Hospital-Acquired Illness or Injury  Outcome: Progressing  Goal: Optimal Comfort and Wellbeing  Outcome: Progressing  Goal: Readiness for Transition of Care  Outcome: Progressing     Problem: Infection  Goal: Absence of Infection Signs and Symptoms  Outcome: Progressing   Plan of Care Review  Plan of Care Reviewed With: patient  Progress: improving  Outcome Evaluation: pt anxious and confused overnight. pt incontinent of urine, pt cleaned and changed assist x 2. bed alarm activated. call bell within reach. will continue to monitor

## 2025-05-30 ENCOUNTER — TELEPHONE (OUTPATIENT)
Dept: PALLIATIVE CARE | Facility: CLINIC | Age: 89
End: 2025-05-30
Payer: MEDICARE

## 2025-05-30 PROBLEM — I95.9 HYPOTENSION, UNSPECIFIED HYPOTENSION TYPE: Status: ACTIVE | Noted: 2025-01-01

## 2025-05-30 PROBLEM — R53.81 DEBILITY: Status: ACTIVE | Noted: 2025-05-30

## 2025-05-30 NOTE — ED PROVIDER NOTES
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 Per her report, he seemed weaker than baseline so she took his blood prssure and called EMS. Currently, patient is without complaints for me. Per nursing he endorsed some abdominal pain but he could not specify where.             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)     Coronary artery disease     Disease of thyroid gland     Will catheter in place     6/25 not at present    GI (gastrointestinal bleed)     Hypertension     Myocardial infarction (CMS/HCC)     Orthostatic hypotension     TIA (transient ischemic attack)        Past Surgical History   Procedure Laterality Date    cataract extraction right eye with implant and limbal relaxing incision Right 7/18/2024    Performed by Hayder Moses MD at  OR [facility]    Cataract extraction w/ intraocular lens implant Left     Cataract extraction with LRI and anterior vitrectomy left eye Left 11/16/2023    Performed by Hayder Moses MD at  OR [facility]    Cholecystectomy      Colostomy      COLOSTOMY LAPAROSCOPIC N/A 8/3/2022    Performed by Mat Bryant MD at [facility] OR    Coronary artery bypass graft      DIAGNOSTIC FLEXIBLE SIGMOIDOSCOPY WITH COLLECTION OF SPECIMEN BY WASHING N/A 11/27/2024    Performed by David Holcomb MD at [facility] GI    DIAGNOSTIC FLEXIBLE SIGMOIDOSCOPY WITH COLLECTION OF SPECIMEN BY WASHING N/A 10/18/2024     Performed by Maine Lopez MD at [facility]    DIAGNOSTIC UPPER GASTROINTESTINAL ENDOSCOPY WITH COLLECTION OF SPECIMEN BY WASHING N/A 11/27/2024    Performed by David Holcomb MD at [facility] GI    FLEXIBLE SIGMOIDOSCOPY WITH BIOPSY N/A 10/23/2024    Performed by David Holcomb MD at [facility] GI    INCISION AND DRAINAGE WITH DEBRIDMENT OF BUTTOCK, AND PERINEUM N/A 8/2/2022    Performed by Mat Bryant MD at [facility] OR    Joint replacement Left     Knee    RIGHT HEART CATH N/A 8/4/2022    Performed by Cristal Lacey MD at [facility] CARDIAC CATH/EP    Thyroidectomy      WASHOUT OF PERINEAL WOUND, COLONIC LAVAGE N/A 8/3/2022    Performed by Mat Bryant MD at [facility] OR       No family history on file.    Social History     Tobacco Use    Smoking status: Never    Smokeless tobacco: Never   Vaping Use    Vaping status: Never Used   Substance Use Topics    Alcohol use: Not Currently     Comment: social    Drug use: Never         Review of Systems   REVIEW OF SYSTEMS     Review of Systems   All other systems reviewed and are negative.        VITALS     ED Vitals      Date/Time Temp Pulse Resp BP SpO2 Who   05/30/25 1600 -- 64 21 94/50 96 % AGC   05/30/25 1500 -- 64 23 104/56 99 % AGC   05/30/25 1425 -- 65 -- 99/55 99 % PM   05/30/25 1355 36.4 °C (97.5 °F) -- -- -- -- LJA   05/30/25 1318 -- 65 16 86/52 95 % AGC          Pulse Ox %: 99 % (05/30/25 1552)  Pulse Ox Interpretation: Normal (05/30/25 1552)  Heart Rate: 64 (05/30/25 1552)  Rhythm Strip Interpretation: Normal Sinus Rhythm (05/30/25 1552)     Physical Exam   PHYSICAL EXAM     Physical Exam  Constitutional:       Appearance: He is ill-appearing.   HENT:      Head: Normocephalic and atraumatic.      Mouth/Throat:      Mouth: Mucous membranes are dry.   Cardiovascular:      Rate and Rhythm: Normal rate and regular rhythm.      Pulses: Normal pulses.      Heart sounds: Normal heart sounds.   Pulmonary:      Effort: Pulmonary effort is normal.      Breath sounds: Rhonchi  present.   Abdominal:      General: There is distension.      Tenderness: There is no abdominal tenderness. There is no guarding or rebound.   Neurological:      Mental Status: He is alert and oriented to person, place, and time.           PROCEDURES     Procedures     DATA     Results       Procedure Component Value Units Date/Time    CK, Total [[id number]]  (Normal) Collected: 05/30/25 1325    Specimen: Blood, Venous Updated: 05/30/25 1510     Total CK 65 U/L     Magnesium [[id number]]  (Normal) Collected: 05/30/25 1325    Specimen: Blood, Venous Updated: 05/30/25 1505     Magnesium 2.2 mg/dL     Lactate, w/ reflex repeat if > 2.0 [[id number]]  (Normal) Collected: 05/30/25 1423    Specimen: Blood, Venous Updated: 05/30/25 1448     Lactate 1.8 mmol/L     CBC and differential [[id number]]  (Abnormal) Collected: 05/30/25 1325    Specimen: Blood, Venous Updated: 05/30/25 1432     WBC 5.70 K/uL      RBC 2.60 M/uL      Hemoglobin 8.6 g/dL      Hematocrit 28.4 %      .2 fL      MCH 33.1 pg      MCHC 30.3 g/dL      RDW 17.9 %      Platelets 95 K/uL      Comment: CONSISTENT WITH PREVIOUS RESULTS        MPV 11.5 fL      Differential Type Auto     nRBC 0.0 %      Immature Granulocytes 0.5 %      Neutrophils 77.7 %      Lymphocytes 6.7 %      Monocytes 13.0 %      Eosinophils 1.6 %      Basophils 0.5 %      Immature Granulocytes, Absolute 0.03 K/uL      Neutrophils, Absolute 4.43 K/uL      Lymphocytes, Absolute 0.38 K/uL      Monocytes, Absolute 0.74 K/uL      Eosinophils, Absolute 0.09 K/uL      Basophils, Absolute 0.03 K/uL      Platelet Estimate Decreased (51,000-149,000)     Anisocytosis 1+     Ovalocytes Occasional     Polychromasia Occasional    HS Troponin I (No Reflex) [[id number]]  (Abnormal) Collected: 05/30/25 1325    Specimen: Blood, Venous Updated: 05/30/25 1429     High Sens Troponin I 28.2 pg/mL     Comprehensive metabolic panel [[id number]]  (Abnormal) Collected: 05/30/25 1325    Specimen: Blood, Venous  Updated: 05/30/25 1427     Sodium 136 mEQ/L      Potassium 4.4 mEQ/L      Comment: Results obtained on plasma. Plasma Potassium values may be up to 0.4 mEQ/L less than serum values. The differences may be greater for patients with high platelet or white cell counts.        Chloride 107 mEQ/L      CO2 21 mEQ/L      BUN 29 mg/dL      Creatinine 1.7 mg/dL      Glucose 172 mg/dL      Calcium 8.2 mg/dL      AST (SGOT) 30 IU/L      ALT (SGPT) 4 IU/L      Alkaline Phosphatase 85 IU/L      Total Protein 7.0 g/dL      Comment: Test performed on plasma which typically contains approximately 0.4 g/dL more protein than serum.        Albumin 2.8 g/dL      Bilirubin, Total 0.8 mg/dL      eGFR 37.1 mL/min/1.73m*2      Comment: Calculation based on the Chronic Kidney Disease Epidemiology Collaboration (CKD-EPI) equation refit without adjustment for race.        Anion Gap 8 mEQ/L     [place] Draw Panel [[id number]] Collected: 05/30/25 1325    Specimen: Blood, Venous Updated: 05/30/25 1334    Narrative:      The following orders were created for panel order [place] Draw Panel.  Procedure                               Abnormality         Status                     ---------                               -----------         ------                     [email or web address][[id number]]                                  In process                   Please view results for these tests on the individual orders.    [employer] [facility] [[id number]] Collected: 05/30/25 1325    Specimen: Blood, Venous Updated: 05/30/25 1334            Imaging Results              CT CHEST WITHOUT IV CONTRAST (Final result)  Result time 05/30/25 15:50:34      Final result                   Impression:    IMPRESSION: Large left-sided pleural effusion nearly encompassing the left  hemithorax with mild to moderate left-sided atelectasis.  Small to moderate  right lower lobe effusion with atelectasis.  Dependent change in the right upper  lobe.  Progression of the right-sided  findings versus the prior CT.    Abdominal pelvic ascites, progressed versus the prior study.    Additional essentially stable findings as above.               Narrative:    CLINICAL HISTORY:   Respiratory illness, nondiagnostic xray.  Bowel obstruction.    Comment:   Helical CT imaging of the chest, abdomen and pelvis was obtained.  Multiplanar reformats reviewed.    Administered contrast and dose:  [<None>]              CT DOSE:   One or more dose reduction techniques (e.g. automated exposure  control, adjustment of the mA and/or kV according to patient size, use of  iterative reconstruction technique) utilized for this examination.    Comparison: Chest x-ray 5/30/2025.  CT 5/24/2025    Patent central trachea.  Nonaneurysmal thoracic aorta given pulsation artifact  with mild to moderate atheromatous change.  Severe coronary artery vascular  calcifications.  Multichamber enlargement of heart.  Epicardial pacing wires.  No pericardial effusion.    Small hiatal hernia.    Large left-sided pleural effusion nearly encompassing the left hemithorax with  mild to moderate left-sided atelectasis.  Small to moderate right lower lobe  effusion with atelectasis.  Mild to moderate dependent change within the right  upper lobe.        Uniform unenhanced attenuation to the liver.  Surgically absent gallbladder.  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  Prominent inferior vena cava below the  liver as well.    Uniform unenhanced attenuation to the spleen or relatively atrophic pancreas.    No discrete adrenal gland mass.    The kidneys are free of obvious hydronephrosis.  Multiple bilateral renal  hypodense and hyperdense lesions, incompletely characterized, presumed to be a  combination of complex and simple cysts.    Fairly pronounced vascular calcifications of the splenic artery.  Mild to  moderate atheromatous change of the abdominal aorta without aneurysm.    Small to moderate amount of abdominal pelvic ascites.    In the  absence of oral contrast no definite evidence of GI obstruction.  Moderate diffuse colonic diverticulosis.  Thickening versus underdistention of  the sigmoid colon.  Inflammatory change about the mid sigmoid colon.    Left lower quadrant ostomy.    Right iliac internal artery aneurysm again noted, incompletely assessed on this  study.  This is delineated on the prior CT, proximal 2.4 cm in size on axial  image 160.    The visualized pelvic organs including the prostate gland are within normal  limits.    Mild anasarca.    The patient is osteopenic.  The osseous structures have mild to moderate  degenerative change.  Stable appearing compression deformity of L1.  Stable  compression deformity of T5.  Moderate kyphosis of the thoracic spine.  Median  sternotomy.                                       CT ABDOMEN PELVIS WITHOUT IV CONTRAST (Final result)  Result time 05/30/25 15:50:34      Final result                   Impression:    IMPRESSION: Large left-sided pleural effusion nearly encompassing the left  hemithorax with mild to moderate left-sided atelectasis.  Small to moderate  right lower lobe effusion with atelectasis.  Dependent change in the right upper  lobe.  Progression of the right-sided findings versus the prior CT.    Abdominal pelvic ascites, progressed versus the prior study.    Additional essentially stable findings as above.               Narrative:    CLINICAL HISTORY:   Respiratory illness, nondiagnostic xray.  Bowel obstruction.    Comment:   Helical CT imaging of the chest, abdomen and pelvis was obtained.  Multiplanar reformats reviewed.    Administered contrast and dose:  [<None>]              CT DOSE:   One or more dose reduction techniques (e.g. automated exposure  control, adjustment of the mA and/or kV according to patient size, use of  iterative reconstruction technique) utilized for this examination.    Comparison: Chest x-ray 5/30/2025.  CT 5/24/2025    Patent central trachea.   Nonaneurysmal thoracic aorta given pulsation artifact  with mild to moderate atheromatous change.  Severe coronary artery vascular  calcifications.  Multichamber enlargement of heart.  Epicardial pacing wires.  No pericardial effusion.    Small hiatal hernia.    Large left-sided pleural effusion nearly encompassing the left hemithorax with  mild to moderate left-sided atelectasis.  Small to moderate right lower lobe  effusion with atelectasis.  Mild to moderate dependent change within the right  upper lobe.        Uniform unenhanced attenuation to the liver.  Surgically absent gallbladder.  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  Prominent inferior vena cava below the  liver as well.    Uniform unenhanced attenuation to the spleen or relatively atrophic pancreas.    No discrete adrenal gland mass.    The kidneys are free of obvious hydronephrosis.  Multiple bilateral renal  hypodense and hyperdense lesions, incompletely characterized, presumed to be a  combination of complex and simple cysts.    Fairly pronounced vascular calcifications of the splenic artery.  Mild to  moderate atheromatous change of the abdominal aorta without aneurysm.    Small to moderate amount of abdominal pelvic ascites.    In the absence of oral contrast no definite evidence of GI obstruction.  Moderate diffuse colonic diverticulosis.  Thickening versus underdistention of  the sigmoid colon.  Inflammatory change about the mid sigmoid colon.    Left lower quadrant ostomy.    Right iliac internal artery aneurysm again noted, incompletely assessed on this  study.  This is delineated on the prior CT, proximal 2.4 cm in size on axial  image 160.    The visualized pelvic organs including the prostate gland are within normal  limits.    Mild anasarca.    The patient is osteopenic.  The osseous structures have mild to moderate  degenerative change.  Stable appearing compression deformity of L1.  Stable  compression deformity of T5.  Moderate  kyphosis of the thoracic spine.  Median  sternotomy.                                       X-RAY CHEST 1 VIEW (Final result)  Result time 05/30/25 14:31:51      Final result                   Impression:    IMPRESSION:    Improvement in right pleural effusion.  Stable pleural parenchymal process at the left lung.               Narrative:    CLINICAL HISTORY: Dyspnea.    COMMENT: A single frontal radiograph of the chest is obtained, and compared to  previous exam from 5/28/2025.    Cardiomegaly. Median sternotomy wires. A left-sided pleural effusion and  associated atelectasis is stable from previous examination. The effusion appears  loculated.    Interval decrease in size of right-sided pleural effusion. Patchy airspace  disease at the right lung base. No pneumothorax.                                      ECG 12 lead          Scoring tools                                  ED Course & MDM   MDM / ED COURSE / CLINICAL IMPRESSION / DISPO     Medical Decision Making  Amount and/or Complexity of Data Reviewed  Labs: ordered.  Radiology: ordered.  ECG/medicine tests: ordered.    Risk  Decision regarding hospitalization.        ED Course as of 05/30/25 1655   Fri May 30, 2025   1355 Patient reassessed at this time. Resting comfortably. Bp improving with ivf.  [LA]   1549 Spoke with patient's son Marko - patient with increased agitation at home recently. Interested in speaking with palliative care / hospice during this ed encounter, hospice team aware  [LA]   1615 Spoke with hospice team - will evaluate patient either later tonight or in AM, recommending medical admission  [LA]   1654 Spoke with [facility] team , agreeable to admit  [LA]      ED Course User Index  [LA] Justice Roy MD     Clinical Impression      Hypotension, unspecified hypotension type   Pleural effusion     _________________       ED Disposition   Admit / Observation                       Justice Roy MD  05/30/25 1655

## 2025-05-30 NOTE — NURSING NOTE
At 1930, transportation arrived. Pt dc to home. Pt's family called to verify if his home aid will be present when patient arrives home. pt's son called back to confirmed.RN spoke with pt's home aid over the phone regarding discharge. IV removed. Discharge paper and belongings given to transport. Pt only belongings were a pair a socks.

## 2025-05-30 NOTE — H&P
Hospital Medicine    History & Physical        CHIEF COMPLAINT   Decreased oral intake, worsening fatigue and mentation     HISTORY OF PRESENT ILLNESS      Samy Elena is a 89 y.o. 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 He was recently discharged  from [facility] on 5/29/25 after admission for toxic metabolic encephalopathy and CALI secondary to acute cystitis (ESBL Proteus), treated with IV meropenem and discharged on oral cefuroxime.    Today, he was brought to the ED due to persistent fatigue and worsened debility since discharge. Son reports that he has not returned to baseline, with continued decreased activity and oral intake. O arrival to the ED he was hypotensive with SBP (), HR 65-71, slighlty improved with IVFs. Labs are stable from prior admission.  Imaging shows persistent bilateral pleural effusions (L>R) with interval ascites progression. Family is open to hospice and requested a consult, pending formal evaluation tomorrow.     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    Atrial fibrillation (CMS/HCC)     CHF (congestive heart failure) (CMS/[facility])     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[facility])     Coronary artery disease     Disease of thyroid gland     Will catheter in place     6/25 not at present    GI (gastrointestinal bleed)     Hypertension     Myocardial infarction  (CMS/HCC)     Orthostatic hypotension     TIA (transient ischemic attack)        Past Surgical History   Procedure Laterality Date    cataract extraction right eye with implant and limbal relaxing incision Right 7/18/2024    Performed by Hayder Moses MD at  OR [facility]    Cataract extraction w/ intraocular lens implant Left     Cataract extraction with LRI and anterior vitrectomy left eye Left 11/16/2023    Performed by Hayder Moses MD at  OR [facility]    Cholecystectomy      Colostomy      COLOSTOMY LAPAROSCOPIC N/A 8/3/2022    Performed by Mat Bryant MD at [facility] OR    Coronary artery bypass graft      DIAGNOSTIC FLEXIBLE SIGMOIDOSCOPY WITH COLLECTION OF SPECIMEN BY WASHING N/A 11/27/2024    Performed by David Holcomb MD at [facility] GI    DIAGNOSTIC FLEXIBLE SIGMOIDOSCOPY WITH COLLECTION OF SPECIMEN BY WASHING N/A 10/18/2024    Performed by Maine Lopez MD at [facility]    DIAGNOSTIC UPPER GASTROINTESTINAL ENDOSCOPY WITH COLLECTION OF SPECIMEN BY WASHING N/A 11/27/2024    Performed by David Holcomb MD at [facility]    FLEXIBLE SIGMOIDOSCOPY WITH BIOPSY N/A 10/23/2024    Performed by David Holcomb MD at [facility]    INCISION AND DRAINAGE WITH DEBRIDMENT OF BUTTOCK, AND PERINEUM N/A 8/2/2022    Performed by Mat Bryant MD at [facility] OR    Joint replacement Left     Knee    RIGHT HEART CATH N/A 8/4/2022    Performed by Cristal Lacey MD at [facility] CARDIAC CATH/EP    Thyroidectomy      WASHOUT OF PERINEAL WOUND, COLONIC LAVAGE N/A 8/3/2022    Performed by Mat Bryant MD at [facility] OR       PCP: Zachery Hernandez MD    MEDICATIONS      Prior to Admission medications    Medication Sig Start Date End Date Taking? Authorizing Provider   aspirin 81 mg enteric coated tablet Take 81 mg by mouth daily. 9 am    Provider, Clay Lopez MD   bimatoprost (LUMIGAN) 0.01 % ophthalmic drops Administer 1 drop into the left eye nightly.    Provider, Clay Lopez MD   buPROPion XL (WELLBUTRIN XL) 150 mg 24 hr tablet  Take 150 mg by mouth daily.  Patient not taking: Reported on 4/22/2025 2/14/25   ProviderClay MD   calcium, as carbonate, (TUMS) 200 mg calcium (500 mg) chewable tablet Take 1 tablet by mouth daily. 11 am    Clay Chambers MD   carboxymethylcellulose (REFRESH PLUS) 0.5 % dropperette Administer 1 drop into both eyes every2 (two) hours while awake.    Clay Chambers MD   cefuroxime (CEFTIN) 250 mg tablet Take 1 tablet (250 mg total) by mouth 2 (two) times a day for 3 days. 5/30/25 6/2/25  Crystal Alonso DO   clonazePAM (KlonoPIN) 0.5 mg tablet Take 1 tablet (0.5 mg total) by mouth nightly as needed for anxiety. 5/22/25 6/21/25  Tamera Rojo CRNP   cranberry extract 425 mg capsule Take 425 mg by mouth daily. 5 pm    Clay Chambers MD   cyanocobalamin (VITAMIN B12) 500 mcg tablet Take 500 mcg by mouth every morning. 9 am    Clay Chambers MD   ferrous sulfate 325 mg (65 mg iron) tablet Take 65 mg by mouth daily with breakfast. 11 am    Clay Chambers MD   finasteride (PROSCAR) 5 mg tablet Take 5 mg by mouth daily. 9 am    Clay Chambers MD   levothyroxine (SYNTHROID) 100 mcg tablet Take 100 mcg by mouth daily before breakfast. 7 am    Clay Chambers MD   melatonin 10 mg capsule Take 10 mg by mouth nightly. 9 pm    Clay Chambers MD   methenamine (HIPREX) 1 gram tablet Take 1 g by mouth daily before lunch. 11 am    Clay Chambers MD   metoprolol succinate XL (TOPROL-XL) 25 mg 24 hr tablet Take 12.5 mg by mouth daily.    Clay Chambers MD   omeprazole (PriLOSEC) 20 mg capsule Take 20 mg by mouth daily. 11 am    Clay Chambers MD   potassium chloride 20 mEq packet Take 10 mEq by mouth 2 (two) times a day. 10 meq bid    Clay Chambers MD   tamsulosin (FLOMAX) 0.4 mg capsule Take 0.4 mg by mouth every morning. 9 am    Clay Chambers MD   thiamine HCl (VITAMIN  B1) 100 mg tablet Take 100 mg by mouth every morning. 9 am    ProviderClay MD   torsemide (DEMADEX) 10 mg tablet Take 20 mg by mouth 2 (two) times a day. 9 am    Clay Chambers MD   amLODIPine (NORVASC) 2.5 mg tablet amlodipine 2.5 mg tablet  7/22/22  John Chambers MD   apixaban (ELIQUIS) 2.5 mg tablet   7/22/22  John Chambers MD   hydrochlorothiazide (MICROZIDE) 12.5 mg capsule hydrochlorothiazide 12.5 mg capsule  7/22/22  John Chambers MD       ALLERGIES      Jardiance [empagliflozin]    FAMILY HISTORY      No family history on file.    SOCIAL HISTORY      Social History     Substance and Sexual Activity   Drug Use Never     Tobacco Use: Low Risk  (12/30/2024)    Patient History     Smoking Tobacco Use: Never     Smokeless Tobacco Use: Never     Passive Exposure: Not on file     Alcohol Use: Not At Risk (5/24/2025)    AUDIT-C     Frequency of Alcohol Consumption: Monthly or less     Average Number of Drinks: 1 or 2     Frequency of Binge Drinking: Never               REVIEW OF SYSTEMS      All other systems reviewed and negative except as noted in HPI    PHYSICAL EXAMINATION      Temp:  [36.4 °C (97.5 °F)] 36.4 °C (97.5 °F)  Heart Rate:  [64-65] 64  Resp:  [16-23] 21  BP: ()/(50-56) 94/50  There is no height or weight on file to calculate BMI.    Physical Exam  Constitutional:       Appearance: He is ill-appearing.   HENT:      Head: Normocephalic and atraumatic.      Mouth/Throat:      Mouth: Mucous membranes are dry.   Eyes:      Pupils: Pupils are equal, round, and reactive to light.   Cardiovascular:      Rate and Rhythm: Regular rhythm.      Heart sounds: Murmur heard.   Pulmonary:      Effort: Respiratory distress present.   Abdominal:      Palpations: Abdomen is soft.   Musculoskeletal:      Right lower leg: Edema present.      Left lower leg: Edema present.   Neurological:      Mental Status: He is alert. He is confused.         LABS / IMAGING / EKG         Labs    Lactate 1.8  Ck 65  Mg 2.2  Hgb 8.6, wbc 5.7, plts 95  Cr 1.7 - around baseline, l 4.4, sodium 136    Imaging:  CXR:  Improvement in right pleural effusion.  Stable pleural parenchymal process at the left lung.    CT chest:    IMPRESSION: Large left-sided pleural effusion nearly encompassing the left  hemithorax with mild to moderate left-sided atelectasis.  Small to moderate  right lower lobe effusion with atelectasis.  Dependent change in the right upper  lobe.  Progression of the right-sided findings versus the prior CT.     Abdominal pelvic ascites, progressed versus the prior study.     CT A/P:    IMPRESSION: Large left-sided pleural effusion nearly encompassing the left  hemithorax with mild to moderate left-sided atelectasis.  Small to moderate  right lower lobe effusion with atelectasis.  Dependent change in the right upper  lobe.  Progression of the right-sided findings versus the prior CT.     Abdominal pelvic ascites, progressed versus the prior study.     Additional essentially stable findings as above.    ECG/Telemetry  Atrial fibrillation  Right bundle branch block  Abnormal ECG           ASSESSMENT AND PLAN            89M with advanced CHF, CKD, recurrent pleural effusions, and chronic debility presenting with fatigue and anorexia post recent UTI admission, now for supportive care and hospice evaluation.  Assessment & Plan  Debility  Progressive debility with functional decline   Comfort Measures  -Palliative care consult  -GOC discussion initiated, son in agreement to proceed after meeting team  -Pt w/ multiple progressive chronic conditions and high symptom burden  -Noted increased fatigue, anorexia, and functional decline after recent discharge  -Continue supportive care    Recurrent pleural effusions   -Left > right; last thoracentesis 5/28 with good effect  -Currently saturating well on RA, no distress  -Family not wanting any intervention    -CKD stage 3b   -Cr now  1.7,baseline 1.5, improving since last admission  -Likely prerenal component from infection, diuretic use  -Hold torsemide, reassess outpatient with BMP  -Avoid nephrotoxins    Atrial fibrillation  -Not anticoagulated due to recurrent GIB  -Rate controlled on metoprolol  -Continue current regimen    Chronic anemia  Chronic thrombocytopenia  -Hgb stable at 8.6, Plts 95K,  , no signs of overt GIB   -Suspect underlying MDS  -No acute intervention indicated    CAD  HFrEF (EF 40-45%)  -S/P CABG, on aspirin monotherapy  -Torsemide on hold given Cr and euvolemia  -Continue aspirin, monitor for symptoms    Hypothyroidism  -Continue levothyroxine 100mcg daily    BPH  -Continue home tamsulosin and finasteride  -Daily bladder scans inpatient  -Urology follow-up recommended    VTE Assessment: Padua    VTE Prophylaxis: SCDs  Code Status: DNR (A.N.D.)  Estimated Discharge Date: 6/2/2025     Rick Guerrero MD  5/30/2025

## 2025-05-30 NOTE — ASSESSMENT & PLAN NOTE
Progressive debility with functional decline   Comfort Measures  -Palliative care consult  -GOC discussion initiated, son in agreement to proceed after meeting team  -Pt w/ multiple progressive chronic conditions and high symptom burden  -Noted increased fatigue, anorexia, and functional decline after recent discharge  -Continue supportive care    Recurrent pleural effusions   -Left > right; last thoracentesis 5/28 with good effect  -Currently saturating well on RA, no distress  -Family not wanting any intervention    -CKD stage 3b   -Cr now 1.7,baseline 1.5, improving since last admission  -Likely prerenal component from infection, diuretic use  -Hold torsemide, reassess outpatient with BMP  -Avoid nephrotoxins    Atrial fibrillation  -Not anticoagulated due to recurrent GIB  -Rate controlled on metoprolol  -Continue current regimen    Chronic anemia  Chronic thrombocytopenia  -Hgb stable at 8.6, Plts 95K,  , no signs of overt GIB   -Suspect underlying MDS  -No acute intervention indicated    CAD  HFrEF (EF 40-45%)  -S/P CABG, on aspirin monotherapy  -Torsemide on hold given Cr and euvolemia  -Continue aspirin, monitor for symptoms    Hypothyroidism  -Continue levothyroxine 100mcg daily    BPH  -Continue home tamsulosin and finasteride  -Daily bladder scans inpatient  -Urology follow-up recommended

## 2025-05-30 NOTE — ED ATTESTATION NOTE
I have personally seen and examined Samy Elena.  I was involved in the care and medical decision making for this patient.    I reviewed and agree with resident physician's assessment and plan of care; any exceptions are documented below.      My focused history, examination, assessment and plan of care of Samy Elena is as follows:    Brief History:  Patient presents via EMS for evaluation of generalized weakness / fatigue.   Found to be hypotensive prior to arrival to ED.     Focused Physical Exam:  Physical Exam  Vitals and nursing note reviewed.   HENT:      Mouth/Throat:      Mouth: Mucous membranes are dry.   Cardiovascular:      Rate and Rhythm: Normal rate. Rhythm irregular.      Pulses: Normal pulses.      Heart sounds: Normal heart sounds.   Pulmonary:      Effort: Tachypnea present.      Breath sounds: Rhonchi present.   Abdominal:      General: There is distension.      Tenderness: There is abdominal tenderness.   Musculoskeletal:      Right lower leg: No edema.      Left lower leg: No edema.   Skin:     General: Skin is warm and dry.   Neurological:      Mental Status: He is alert.             Assessment / Plan:    Ill appearing however nontoxic-appearing 89-year-old male who presents for evaluation of 1 day of generalized fatigue and weakness.  Was found to be hypotensive by caregivers prior to arrival to ED.  Hypertensive on arrival to ED, remainder vital signs within normal limits.  Clinically appears dehydrated, remainder of exam as above.  Plan for IV hydration, labs and imaging as above.  Prescription sent.    Medical Decision Making  Amount and/or Complexity of Data Reviewed  Labs: ordered.  Radiology: ordered.        I was physically present for the key/critical portions of the following procedures:  Procedures           Justice Roy MD  05/30/25 9540

## 2025-05-30 NOTE — PROGRESS NOTES
Spoke with patient's son and confirmed with medication list from [facility] and/or patient's own pharmacy to complete the medication reconciliation.     Prior to admission medication list:    Current Outpatient Medications:     aspirin 81 mg enteric coated tablet, Take 81 mg by mouth every morning. 9 am, Disp: , Rfl:     bimatoprost (LUMIGAN) 0.01 % ophthalmic drops, Administer 1 drop into the left eye nightly., Disp: , Rfl:     calcium, as carbonate, (TUMS) 200 mg calcium (500 mg) chewable tablet, Take 1 tablet by mouth every morning. 11 am, Disp: , Rfl:     carboxymethylcellulose (REFRESH PLUS) 0.5 % dropperette, Administer 1 drop into both eyes every2 (two) hours while awake., Disp: , Rfl:     cefuroxime (CEFTIN) 250 mg tablet, Take 1 tablet (250 mg total) by mouth 2 (two) times a day for 3 days., Disp: 6 tablet, Rfl: 0    clonazePAM (KlonoPIN) 0.5 mg tablet, Take 1 tablet (0.5 mg total) by mouth nightly as needed for anxiety., Disp: 30 tablet, Rfl: 0    cranberry extract 425 mg capsule, Take 425 mg by mouth every evening. 5 pm, Disp: , Rfl:     cyanocobalamin (VITAMIN B12) 500 mcg tablet, Take 500 mcg by mouth every morning. 9 am, Disp: , Rfl:     ferrous sulfate 325 mg (65 mg iron) tablet, Take 65 mg by mouth daily with breakfast. 11 am, Disp: , Rfl:     finasteride (PROSCAR) 5 mg tablet, Take 5 mg by mouth every morning. 9 am, Disp: , Rfl:     levothyroxine (SYNTHROID) 100 mcg tablet, Take 100 mcg by mouth daily before breakfast. 7 am, Disp: , Rfl:     methenamine (HIPREX) 1 gram tablet, Take 1 g by mouth daily before lunch. 11 am does not take when on antibiotics, Disp: , Rfl:     metoprolol succinate XL (TOPROL-XL) 25 mg 24 hr tablet, Take 12.5 mg by mouth every other day., Disp: , Rfl:     omeprazole (PriLOSEC) 20 mg capsule, Take 20 mg by mouth every morning. 11 am, Disp: , Rfl:     potassium chloride (K-TAB) 20 mEq CR tablet, Take 10 mEq by mouth 2 (two) times a day., Disp: , Rfl:     tamsulosin (FLOMAX)  0.4 mg capsule, Take 0.4 mg by mouth every morning. 9 am, Disp: , Rfl:     thiamine HCl (VITAMIN B1) 100 mg tablet, Take 100 mg by mouth every morning. 9 am, Disp: , Rfl:     [Paused] torsemide (DEMADEX) 10 mg tablet, Take 20 mg by mouth 2 (two) times a day. 9 am, Disp: , Rfl:      Comments about home medications:  -Patient is no longer taking bupropion, clopidogrel or escitalopram.  -Patient finished course of cefixime suspension and cephalexin.  -Patient is taking cefuroxime 250mg until 6/2/25.  -Patient is currently on antibiotics so he does not take methenamine when taking antibiotics.  -Patient is taking metoprolol succinate 25mg 0.5 tablets (12.5mg) every other day.  -Patient is taking potassium chloride 20 mEq 0.5 tablets (10 mEq) twice daily.  -Per discharge summary from 5/29/25 patient's torsemide was paused until PCP tells him to start again.    Compliance:   -potassium chloride 20 mEq last filled 8/20/24 90 day supply.  -Recent fills on all other medications.

## 2025-05-31 PROBLEM — Z51.5 PALLIATIVE CARE BY SPECIALIST: Status: ACTIVE | Noted: 2025-01-01

## 2025-05-31 NOTE — PROGRESS NOTES
Hospice Follow Up Progress Note      Patient Name: Samy Elena                                                                                        Patient MRN: [id number]  Date / Time Note Created: 5/31/2025 4:42 PM  Attending Physician: Nicolas Lee DO  Primary Care Physician: Zachery Hernandez MD     Reason for Consult:  Hospice Evaluation    Code Status History:  Current Code Status: DNR (A.N.D.)    Protestant:  Denominational    Allergies:  Allergies   Allergen Reactions    Jardiance [Empagliflozin] Other (see comments)     denise's gangrene       Advance Directives:  Emergency Contact: Marko Elena; Home Phone: [phone]; Relationship: Son; MCKAYLA ELENA; Relationship: Son; Kassy,Watson; Relationship: Son; Marsha Burgos; Relationship: Grandchild    Plan  Assessed patient at bedside. Patient is pleasant, awake and alert but disoriented. Denied pain. Has not received any prn medications for pain or anxiety. Does not qualify for [facility] LOC.    We will continue to follow.  Please call with any questions or concerns.    Carolina Jackson MSN, RN  Hospice Liaison   [facility]  [address] [place]   [address]  Call [phone] 24 hours a day for questions or concerns  E-Mail:  [email or web address]  Cell: [phone]

## 2025-05-31 NOTE — PROGRESS NOTES
Hospital Medicine     Daily Progress Note       SUBJECTIVE   Patient seen. AAOx2, looking for clothes.      OBJECTIVE   Vital signs in last 24 hours:  Temp:  [36.4 °C (97.5 °F)-36.7 °C (98 °F)] 36.4 °C (97.6 °F)  Heart Rate:  [62-77] 77  Resp:  [12-26] 12  BP: ()/(50-81) 109/59  No intake or output data in the 24 hours ending 05/31/25 1114    Physical Exam  Constitutional:       Appearance: He is ill-appearing.   HENT:      Head: Normocephalic and atraumatic.      Mouth/Throat:      Mouth: Mucous membranes are dry.   Eyes:      Pupils: Pupils are equal, round, and reactive to light.   Cardiovascular:      Rate and Rhythm: Regular rhythm.      Heart sounds: Murmur heard.   Pulmonary:      Effort: normal  Diminished BS bilaterally  Abdominal:      Palpations: Abdomen is soft.   Musculoskeletal:      Right lower leg: Edema present.      Left lower leg: Edema present.   Neurological:      Mental Status: He is alert. He is confused.         LINES, DRAINS, AIRWAYS, WOUNDS   Peripheral IV (Adult) 05/30/25 Anterior;Right Forearm (Active)   Number of days: 1       Peripheral IV (Adult) 05/30/25 Anterior;Left Forearm (Active)   Number of days: 1          LABS, IMAGING, TELEMETRY   CHEMISTRIES   Results from last 7 days   Lab Units 05/30/25  1325 05/28/25  0607 05/27/25  1003 05/26/25  0441 05/25/25  1200 05/25/25  1200   SODIUM mEQ/L 136 138 138 142  --  142   POTASSIUM mEQ/L 4.4 4.4 4.1 3.8  --  3.9   CHLORIDE mEQ/L 107 109* 107 110*  --  111*   CO2 mEQ/L 21 22 22 22  --  23   BUN mg/dL 29* 30* 30* 30*  --  28*   CREATININE mg/dL 1.7* 1.7* 1.9* 1.9*  --  1.6*   GLUCOSE mg/dL 172* 136* 205* 87  --  149*   CALCIUM mg/dL 8.2* 8.0* 7.7* 7.9*  --  8.1*   EGFR mL/min/1.73m*2 37.1* 37.1* 32.5* 32.5*  --  39.9*   ANION GAP mEQ/L 8 7 9 10  --  8   MAGNESIUM mg/dL 2.2 1.8 1.8 1.8   < >  --     < > = values in this interval not displayed.     HEPATIC FUNCTION TESTS   Results from last 7 days   Lab Units  05/30/25  1325 05/26/25  0441 05/24/25  1610   AST IU/L 30 24 30   ALT IU/L 4* 4* 6*   ALK PHOS IU/L 85 76 87   ALBUMIN g/dL 2.8* 2.7* 3.1*   PROTEIN TOTAL g/dL 7.0 6.6 7.7   BILIRUBIN TOTAL mg/dL 0.8 0.9 0.8     CBC RESULTS   Results from last 7 days   Lab Units 05/30/25  1325 05/28/25  0607 05/26/25  0441 05/25/25  1200 05/24/25  1610   WBC K/uL 5.70 4.52 4.50 5.04 3.81   HEMOGLOBIN g/dL 8.6* 8.1* 8.3* 8.5* 8.8*   HEMATOCRIT % 28.4* 26.1* 26.9* 27.6* 28.3*   PLATELETS K/uL 95* 83* 82* 85* 91*     ANEMIA STUDIES   Results from last 7 days   Lab Units 05/24/25  1608   FOLATE ng/mL 16.6   VITAMIN B 12 pg/mL 823     COAGULATION   Results from last 7 days   Lab Units 05/28/25  0607 05/24/25  1608   PROTIME sec 19.6* 17.5*   INR  1.7 1.5       Radiology Reports in last 24hCT CHEST WITHOUT IV CONTRAST  Result Date: 5/30/2025  CLINICAL HISTORY:   Respiratory illness, nondiagnostic xray.  Bowel obstruction. Comment:   Helical CT imaging of the chest, abdomen and pelvis was obtained. Multiplanar reformats reviewed. Administered contrast and dose: [<None>] CT DOSE:   One or more dose reduction techniques (e.g. automated exposure control, adjustment of the mA and/or kV according to patient size, use of iterative reconstruction technique) utilized for this examination. Comparison: Chest x-ray 5/30/2025.  CT 5/24/2025 Patent central trachea.  Nonaneurysmal thoracic aorta given pulsation artifact with mild to moderate atheromatous change.  Severe coronary artery vascular calcifications.  Multichamber enlargement of heart.  Epicardial pacing wires. No pericardial effusion. Small hiatal hernia. Large left-sided pleural effusion nearly encompassing the left hemithorax with mild to moderate left-sided atelectasis.  Small to moderate right lower lobe effusion with atelectasis.  Mild to moderate dependent change within the right upper lobe. Uniform unenhanced attenuation to the liver.  Surgically absent gallbladder. Prominent IVC at  the level of the liver.  Prominent inferior vena cava below the liver as well. Uniform unenhanced attenuation to the spleen or relatively atrophic pancreas. No discrete adrenal gland mass. The kidneys are free of obvious hydronephrosis.  Multiple bilateral renal hypodense and hyperdense lesions, incompletely characterized, presumed to be a combination of complex and simple cysts. Fairly pronounced vascular calcifications of the splenic artery.  Mild to moderate atheromatous change of the abdominal aorta without aneurysm. Small to moderate amount of abdominal pelvic ascites. In the absence of oral contrast no definite evidence of GI obstruction. Moderate diffuse colonic diverticulosis.  Thickening versus underdistention of the sigmoid colon.  Inflammatory change about the mid sigmoid colon. Left lower quadrant ostomy. Right iliac internal artery aneurysm again noted, incompletely assessed on this study.  This is delineated on the prior CT, proximal 2.4 cm in size on axial image 160. The visualized pelvic organs including the prostate gland are within normal limits. Mild anasarca. The patient is osteopenic.  The osseous structures have mild to moderate degenerative change.  Stable appearing compression deformity of L1.  Stable compression deformity of T5.  Moderate kyphosis of the thoracic spine.  Median sternotomy.     IMPRESSION: Large left-sided pleural effusion nearly encompassing the left hemithorax with mild to moderate left-sided atelectasis.  Small to moderate right lower lobe effusion with atelectasis.  Dependent change in the right upper lobe.  Progression of the right-sided findings versus the prior CT. Abdominal pelvic ascites, progressed versus the prior study. Additional essentially stable findings as above.    CT ABDOMEN PELVIS WITHOUT IV CONTRAST  Result Date: 5/30/2025  CLINICAL HISTORY:   Respiratory illness, nondiagnostic xray.  Bowel obstruction. Comment:   Helical CT imaging of the chest, abdomen  and pelvis was obtained. Multiplanar reformats reviewed. Administered contrast and dose: [<None>] CT DOSE:   One or more dose reduction techniques (e.g. automated exposure control, adjustment of the mA and/or kV according to patient size, use of iterative reconstruction technique) utilized for this examination. Comparison: Chest x-ray 5/30/2025.  CT 5/24/2025 Patent central trachea.  Nonaneurysmal thoracic aorta given pulsation artifact with mild to moderate atheromatous change.  Severe coronary artery vascular calcifications.  Multichamber enlargement of heart.  Epicardial pacing wires. No pericardial effusion. Small hiatal hernia. Large left-sided pleural effusion nearly encompassing the left hemithorax with mild to moderate left-sided atelectasis.  Small to moderate right lower lobe effusion with atelectasis.  Mild to moderate dependent change within the right upper lobe. Uniform unenhanced attenuation to the liver.  Surgically absent gallbladder. 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  Prominent inferior vena cava below the liver as well. Uniform unenhanced attenuation to the spleen or relatively atrophic pancreas. No discrete adrenal gland mass. The kidneys are free of obvious hydronephrosis.  Multiple bilateral renal hypodense and hyperdense lesions, incompletely characterized, presumed to be a combination of complex and simple cysts. Fairly pronounced vascular calcifications of the splenic artery.  Mild to moderate atheromatous change of the abdominal aorta without aneurysm. Small to moderate amount of abdominal pelvic ascites. In the absence of oral contrast no definite evidence of GI obstruction. Moderate diffuse colonic diverticulosis.  Thickening versus underdistention of the sigmoid colon.  Inflammatory change about the mid sigmoid colon. Left lower quadrant ostomy. Right iliac internal artery aneurysm again noted, incompletely assessed on this study.  This is delineated on the prior CT, proximal 2.4  cm in size on axial image 160. The visualized pelvic organs including the prostate gland are within normal limits. Mild anasarca. The patient is osteopenic.  The osseous structures have mild to moderate degenerative change.  Stable appearing compression deformity of L1.  Stable compression deformity of T5.  Moderate kyphosis of the thoracic spine.  Median sternotomy.     IMPRESSION: Large left-sided pleural effusion nearly encompassing the left hemithorax with mild to moderate left-sided atelectasis.  Small to moderate right lower lobe effusion with atelectasis.  Dependent change in the right upper lobe.  Progression of the right-sided findings versus the prior CT. Abdominal pelvic ascites, progressed versus the prior study. Additional essentially stable findings as above.    X-RAY CHEST 1 VIEW  Result Date: 5/30/2025  CLINICAL HISTORY: Dyspnea. COMMENT: A single frontal radiograph of the chest is obtained, and compared to previous exam from 5/28/2025. Cardiomegaly. Median sternotomy wires. A left-sided pleural effusion and associated atelectasis is stable from previous examination. The effusion appears loculated. Interval decrease in size of right-sided pleural effusion. Patchy airspace disease at the right lung base. No pneumothorax.     IMPRESSION: Improvement in right pleural effusion. Stable pleural parenchymal process at the left lung.          ASSESSMENT AND PLAN     Progressive debility with functional decline   Comfort Measures  -Palliative care consult  -GOC discussion initiated, son in agreement to proceed after meeting team  -Pt w/ multiple progressive chronic conditions and high symptom burden  -Noted increased fatigue, anorexia, and functional decline after recent discharge  -Continue supportive care     Recurrent pleural effusions   -Left > right; last thoracentesis 5/28 with good effect  -Currently saturating well on RA, no distress  -Family not wanting any intervention     -CKD stage 3b   -Cr now  1.7,baseline 1.5, improving since last admission  -Likely prerenal component from infection, diuretic use  -Hold torsemide, reassess outpatient with BMP  -Avoid nephrotoxins     Atrial fibrillation  -Not anticoagulated due to recurrent GIB  -Rate controlled on metoprolol  -Continue current regimen     Chronic anemia  Chronic thrombocytopenia  -Hgb stable at 8.6, Plts 95K,  , no signs of overt GIB   -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

## 2025-05-31 NOTE — NURSING NOTE
Patient resting in bed.  Meds given.  Assessment complete.  Patient repositioned in bed Q2. Patient oriented to time and place, forgetful at times.  Bed alarm in place.  Will continue to monitor

## 2025-05-31 NOTE — CONSULTS
Palliative Care Consult      This is Hospital Day: 2    Conversation/Goals of Care:  Ongoing - likely d/c home w/ routine hospice care    Pt does not meet criteria for in-patient hospice admission    Assessment/Plan  Assessment & Plan  Debility  - Debility likely multifactorial in the setting of HFrEF, TR, afib, recurrent pleural effusion, recurrent GIB.    - Living situation prior to hospitalization home with caregiver.   - Baseline functional status is wheelchair for ambulation.   - Current Palliative Performance Status: 40%  - Baseline Palliative Performance Status: 40%   - Patient remains high risk for further deterioration and functional decline.     Plan:  - continue current plan of care  - Palliative care will continue to follow for complex medical decision making  - If any decline, recommend family meeting with his sons.     Palliative care by specialist  89 y.o. with a PMH of HFrEF, TR, afib, recurrent pleural effusion, recurrent GIB, recurrent UTI presenting      - Code status: Do Not Resuscitate / Allow Natural Death  - Prognosis: months  - Advance Directives: yes  - Surrogate Decision Maker: kaykay Zhu is healthcare agent   - Capacity unreliable   - Spiritual & Existential Needs: none identified         Reason for Consult:  Hospice referral or discussion;Assistance with clarification of goals of care    HPI      Source: chart review, the patient, and primary team, primary RN    Samy Elena is an 89 y.o. 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 Pt states that he came to the hospital s/p mechanical fall during transfer from wheelchair to bed w/ assistance of PCG.      Pt denies  "any pain/discomfort/symptom burden, at this time. Pt states \"I hope we get it all sorted out and I get home tomorrow.\"    No family at bedside.    Per discussion w/ Primary team, Pt's son brought him to ED for decreased PO intake and worsening mentation     Pt appears to be comfortable. Pt remains at high risk for decline 2/2 severe debility.    See Problem list for recommendations.      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)     Coronary artery disease     Disease of thyroid gland     Will catheter in place     6/25 not at present    GI (gastrointestinal bleed)     Hypertension     Myocardial infarction (CMS/HCC)     Orthostatic hypotension     TIA (transient ischemic attack)        Past Surgical History  Past Surgical History   Procedure Laterality Date    cataract extraction right eye with implant and limbal relaxing incision Right 7/18/2024    Performed by Hayder Moses MD at  [facility]    Cataract extraction w/ intraocular lens implant Left     Cataract extraction with LRI and anterior vitrectomy left eye Left 11/16/2023    Performed by Hayder Moses MD at  [facility]    Cholecystectomy      Colostomy      COLOSTOMY LAPAROSCOPIC N/A 8/3/2022    Performed by Mat Bryant MD at [facility]    Coronary artery bypass graft      DIAGNOSTIC FLEXIBLE SIGMOIDOSCOPY WITH COLLECTION OF SPECIMEN BY WASHING N/A 11/27/2024    Performed by David Holcomb MD at [facility] [place]    DIAGNOSTIC FLEXIBLE SIGMOIDOSCOPY WITH COLLECTION OF SPECIMEN BY WASHING N/A 10/18/2024    Performed by Maine Lopez MD at [facility] [place] [facility]    DIAGNOSTIC UPPER GASTROINTESTINAL ENDOSCOPY WITH COLLECTION OF SPECIMEN BY WASHING N/A 11/27/2024    Performed by " David Holcomb MD at [facility] [place] [facility]    FLEXIBLE SIGMOIDOSCOPY WITH BIOPSY N/A 10/23/2024    Performed by David Holcomb MD at [facility] [place] [facility]    INCISION AND DRAINAGE WITH DEBRIDMENT OF BUTTOCK, AND PERINEUM N/A 8/2/2022    Performed by Mat Bryant MD at [facility] [place] OR    Joint replacement Left     Knee    RIGHT HEART CATH N/A 8/4/2022    Performed by Cristal Lacey MD at [facility] [place] CARDIAC CATH/EP    Thyroidectomy      WASHOUT OF PERINEAL WOUND, COLONIC LAVAGE N/A 8/3/2022    Performed by Mat Bryant MD at [facility] [place] [facility] OR       Latter-day:  Yazidism  Mandaen / Spiritual:   no spiritual concerns identified    Review of Systems:  All other systems reviewed and negative except as noted in the HPI.    [facility]  [facility] (Outside records) query reviewed with no concerns.    Current Medications:  Current Facility-Administered Medications   Medication Dose Route Frequency Provider Last Rate Last Admin    aspirin enteric coated tablet 81 mg  81 mg oral Daily Rick Guerrero MD   81 mg at 05/31/25 0840    buPROPion XL (WELLBUTRIN XL) 24 hr ER tablet 150 mg  150 mg oral Daily Rick Guerrero MD   150 mg at 05/31/25 0840    calcium (as carbonate) (TUMS) chewable tablet 200 mg of elemental calcium  200 mg of elemental calcium oral Daily Rick Guerrero MD        carboxymethylcellulose (REFRESH PLUS) 0.5 % ophthalmic dropperette 1 drop  1 drop Both Eyes q2h while awake Rick Guerrero MD   1 drop at 05/31/25 1610    cefUROXime (CEFTIN) tablet 250 mg  250 mg oral BID Rick Guerrero MD   250 mg at 05/31/25 0841    clonazePAM (klonoPIN) tablet 0.5 mg  0.5 mg oral Nightly PRN Rick Guerrero MD        cyanocobalamin (VITAMIN B12) tablet 500 mcg  500 mcg oral q AM Rick Guerrero MD   500 mcg at 05/31/25 0840    ferrous sulfate tablet 325 mg  325 mg oral Daily with breakfast Rick Guerrero MD   325 mg at 05/31/25 0840    finasteride (PROSCAR) tablet 5 mg  5 mg oral Daily Rick Guerrero MD   5  mg at 05/31/25 0840    latanoprost (XALATAN) 0.005 % ophthalmic solution 1 drop  1 drop Both Eyes Nightly Rick Guerrero MD        levothyroxine (SYNTHROID) tablet 100 mcg  100 mcg oral Daily before breakfast Rick Guerrero MD   100 mcg at 05/31/25 0639    melatonin capsule 10 mg  10 mg oral Nightly Rick Guerrero MD   10 mg at 05/30/25 2153    metoprolol succinate ER (TOPROL-XL) pre-halved 24 hr ER tablet 12.5 mg  12.5 mg oral Daily Rick Guerrero MD        pantoprazole (PROTONIX) tablet,delayed release (DR/EC) 20 mg  20 mg oral Daily Rick Guerrero MD   20 mg at 05/31/25 0840    tamsulosin (FLOMAX) 24 hr ER capsule 0.4 mg  0.4 mg oral q AM Rick Guerrero MD   0.4 mg at 05/31/25 0840    thiamine (VITAMIN B1) tablet 100 mg  100 mg oral q AM Rick Guerrero MD   100 mg at 05/31/25 0841       Allergies:  Allergies   Allergen Reactions    Jardiance [Empagliflozin] Other (see comments)     denise's gangrene         Objective    Physical Exam:   Physical Exam  Constitutional:       Appearance: He is underweight. He is ill-appearing.   HENT:      Head: Normocephalic and atraumatic.      Nose: Nose normal.      Mouth/Throat:      Mouth: Mucous membranes are moist.   Eyes:      General: No scleral icterus.  Pulmonary:      Effort: Pulmonary effort is normal. No respiratory distress.   Abdominal:      Palpations: Abdomen is soft.   Skin:     General: Skin is warm and dry.   Neurological:      Mental Status: He is alert. He is confused.   Psychiatric:         Behavior: Behavior is not agitated.         Vitals:  Vitals:    05/31/25 0745   BP: (!) 109/59   Pulse: 77   Resp: 12   Temp: 36.4 °C (97.6 °F)   SpO2: (!) 90%       Laboratory Studies:    I have reviewed the patient's labs to the time of note. No new clinical concern.    Imaging and Other Studies:     I have independently reviewed the pertinent imaging to the time of note and agree with reported results.    I have independently  reviewed the pertinent cardiac studies to the time of note and agree with reported results.    Advanced care planning: Samy has an ACP and Samy's surrogate decision maker is Marko.      HEIDI Schneider  Office [phone]

## 2025-05-31 NOTE — PLAN OF CARE
Plan of Care Review  Outcome Evaluation: patient arrived to unit alert, agitated yelling, uncooperative with admission questions. safety precautions initiated. bed alarm in use call bell within reach. dual skin assessment completed patient colostomy intact. incontinent care provided.

## 2025-05-31 NOTE — ASSESSMENT & PLAN NOTE
Pediatric Critical Care History and Physical      Subjective     HPI:  David Gold is a 4-year-old boy with a history of premature birth at 24 weeks, grade 4 IVH s/p  shunt, G-tube dependence, gastroparesis, BPD, PDA s/p device closure who presented to the ED for evaluation of fever and respiratory distress in the setting of an RSV infection.  Mom notes that the patient has been sick since 12/26, has had low-grade temperature up to 99.9, she noted today that the patient was working hard to breathe, notes he has a history of asthma and she has been doing breathing treatments at home, additionally the patient is on day 3 of a steroid course.  Mom notes that her and the dad have also been sick.  No significant drop in urine output, no diarrhea.  He was hypoxic, tachypneic and tachycardic in the ED and was immediately evaluated.    In the ED he received a fluid bolus and received IV steroids, magnesium, fluids, and continuous albuterol with some improvement.  He was, however, unable to be weaned from continuous albuterol, so he was brought to the ICU for further management.    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 with a PMH of HFrEF, TR, afib, recurrent pleural effusion, recurrent GIB, recurrent UTI presenting      - Code status: Do Not Resuscitate / Allow Natural Death  - Prognosis: months  - Advance Directives: yes  - Surrogate Decision Maker: kaykay Zhu is healthcare agent   - Capacity unreliable   - Spiritual & Existential Needs: none identified      Ventriculoperitoneal shunt creation    OTHER SURGICAL HISTORY  02/09/2022    Gastrostomy tube insertion    OTHER SURGICAL HISTORY  10/12/2022    Patent ductus arteriosus repair    STRABISMUS SURGERY Bilateral 10/17/2022    BMRc 6.0 mmOD and 6.5 mm OS     Medications Prior to Admission   Medication Sig Dispense Refill Last Dose/Taking    clonazePAM (KlonoPIN) 2 mg tablet    Taking    acetaminophen (Tylenol) 160 mg/5 mL liquid Take by mouth every 4 hours if needed.       albuterol 90 mcg/actuation inhaler Inhale 2-4 puffs every 4 hours if needed for wheezing or shortness of breath. 18 g 2     cloBAZam (Onfi) 2.5 mg/mL suspension Take 2 mL (5 mg) by mouth 2 times a day.       cyproheptadine 2 mg/5 mL syrup Give 5 mL in the morning and give 10 mL in the evening via g-tube. 450 mL 3     hydrocortisone 1 % ointment Apply topically 2 times a day. (Patient not taking: Reported on 12/4/2024) 15 g 2     levETIRAcetam 100 mg/mL solution Take 4 mL (400 mg) by mouth every 12 hours.       mometasone-formoterol (Dulera) 100-5 mcg/actuation inhaler Inhale 2 puffs 2 times a day. For 1 week with illness 13 g 3     NexIUM Packet 10 mg packet Mix 1 packet (10 mg) with 15 mL of water and give once daily via g-tube. 30 each 3     prednisoLONE sodium phosphate (OrapRED) 15 mg/5 mL oral solution Take 4 mL (12 mg) by mouth once daily. For 3-5 days when in the red zone. Call before starting [phone] 20 mL 0      No Known Allergies  Tobacco Use    Passive exposure: Never     Family History   Problem Relation Name Age of Onset    Other (Cerebrovascular Accident) Mother      Other (Cerebrovascular Accident) Father      Other (Cerebrovascular Accident) Other          Maternal Uncle       Medications  cloBAZam, 5 mg, g-tube, BID  clonazePAM, 0.1 mg, g-tube, q12h SHERI  ipratropium, 500 mcg, nebulization, q6h  levETIRAcetam, 405 mg, intravenous, q12h  methylPREDNISolone sodium succinate (PF), 0.5 mg/kg (Dosing Weight), intravenous,  "q6h  pantoprazole, 1 mg/kg (Dosing Weight), intravenous, Daily      albuterol, 15 mg/hr, Last Rate: 15 mg/hr (12/29/24 2203)  D5 % and 0.9 % sodium chloride, 46 mL/hr, Last Rate: 46 mL/hr (12/29/24 2050)      PRN medications: acetaminophen, albuterol, albuterol, lidocaine 1% buffered, oxygen    Review of Systems:  All other review of systems are negative    Objective   Last Recorded Vitals  Blood pressure (!) 99/40, pulse (!) 146, temperature 36.5 °C (97.7 °F), resp. rate (!) 44, height 0.89 m (2' 11.04\"), weight 13.3 kg, SpO2 95%.  Medical Gas Therapy: Supplemental oxygen  Medical Gas Delivery Method: Nasal cannula    Intake/Output Summary (Last 24 hours) at 12/30/2024 0756  Last data filed at 12/30/2024 0700  Gross per 24 hour   Intake 944.15 ml   Output 33 ml   Net 911.15 ml       Peripheral IV 12/29/24 24 G Left (Active)   Placement Date/Time: 12/29/24 1728   Size (Gauge): 24 G  Orientation: Left  Location: Hand  Site Prep: Chlorhexidine   Placed by: teddy Thomas  Insertion attempts: 1  Patient Tolerance: Age appropriate   Number of days: 0       Gastrostomy/Enterostomy LUQ (Active)   No placement date or time found.   Location: LUQ   Number of days:        Gastrostomy/Enterostomy Gastrostomy LUQ (Active)   No placement date or time found.   Type: Gastrostomy  Location: LUQ   Number of days:         Physical Exam:  General: Well-nourished well-appearing boy in no significant respiratory distress at time of evaluation  HEENT: Mildly macrocephalic, atraumatic, moist mucous membranes, nasal cannula in place  CV: Mildly tachycardic, regular rhythm, no murmurs or gallops  Respiratory: At time of evaluation, no significant expiratory wheezing appreciated, coarse lung sounds heard throughout  Abdomen: Distended, but not apparently tender abdomen.  G-tube accessed and in place with site appearing clean, dry, and intact  Extremities: 2+ bilateral radial pulses, capillary refill less than 2 seconds in bilateral upper " extremities  Neuro: Sleeping on exam, moving head and both arms spontaneously    Lab/Radiology/Diagnostic Review:  Labs  Results for orders placed or performed during the hospital encounter of 12/29/24 (from the past 24 hours)   CBC and Auto Differential   Result Value Ref Range    WBC 8.4 5.0 - 17.0 x10*3/uL    nRBC 0.0 0.0 - 0.0 /100 WBCs    RBC 4.85 3.90 - 5.30 x10*6/uL    Hemoglobin 14.5 (H) 11.5 - 13.5 g/dL    Hematocrit 42.0 (H) 34.0 - 40.0 %    MCV 87 75 - 87 fL    MCH 29.9 24.0 - 30.0 pg    MCHC 34.5 31.0 - 37.0 g/dL    RDW 11.7 11.5 - 14.5 %    Platelets 281 150 - 400 x10*3/uL    Neutrophils % 54.0 17.0 - 45.0 %    Immature Granulocytes %, Automated 0.4 0.0 - 1.0 %    Lymphocytes % 28.4 40.0 - 76.0 %    Monocytes % 17.0 3.0 - 9.0 %    Eosinophils % 0.0 0.0 - 5.0 %    Basophils % 0.2 0.0 - 1.0 %    Neutrophils Absolute 4.54 1.50 - 7.00 x10*3/uL    Immature Granulocytes Absolute, Automated 0.03 0.00 - 0.10 x10*3/uL    Lymphocytes Absolute 2.39 (L) 2.50 - 8.00 x10*3/uL    Monocytes Absolute 1.43 (H) 0.10 - 1.40 x10*3/uL    Eosinophils Absolute 0.00 0.00 - 0.70 x10*3/uL    Basophils Absolute 0.02 0.00 - 0.10 x10*3/uL   C-Reactive Protein   Result Value Ref Range    C-Reactive Protein 1.52 (H) <1.00 mg/dL   Comprehensive Metabolic Panel   Result Value Ref Range    Glucose 92 60 - 99 mg/dL    Sodium 144 136 - 145 mmol/L    Potassium 6.3 (HH) 3.3 - 4.7 mmol/L    Chloride 106 98 - 107 mmol/L    Bicarbonate 28 (H) 18 - 27 mmol/L    Anion Gap 16 10 - 30 mmol/L    Urea Nitrogen 20 6 - 23 mg/dL    Creatinine 0.21 0.20 - 0.50 mg/dL    eGFR      Calcium 9.7 8.5 - 10.7 mg/dL    Albumin 4.7 3.4 - 4.7 g/dL    Alkaline Phosphatase 115 (L) 132 - 315 U/L    Total Protein 8.0 (H) 5.9 - 7.2 g/dL    AST 50 (H) 16 - 40 U/L    Bilirubin, Total 0.3 0.0 - 0.7 mg/dL    ALT 18 3 - 28 U/L   Blood Culture    Specimen: Peripheral Venipuncture; Blood culture   Result Value Ref Range    Blood Culture Loaded on Instrument - Culture in  progress    Metapneumovirus PCR   Result Value Ref Range    Metapneumovirus (Human), PCR Not Detected Not detected   Adenovirus PCR Qual For Respiratory Samples   Result Value Ref Range    Adenovirus PCR, Qual Not Detected Not detected   Parainfluenza PCR   Result Value Ref Range    Parainfluenza 1, PCR Not Detected Not Detected, Invalid    Parainfluenza 2, PCR Not Detected Not Detected, Invalid    Parainfluenza 3, PCR Not Detected Not Detected, Invalid    Parainfluenza 4, PCR Not Detected Not Detected, Invalid   Influenza A, and B PCR   Result Value Ref Range    Flu A Result Not Detected Not Detected    Flu B Result Not Detected Not Detected   Sars-CoV-2 PCR   Result Value Ref Range    Coronavirus 2019, PCR Not Detected Not Detected   RSV PCR   Result Value Ref Range    RSV PCR Detected (A) Not Detected   Potassium   Result Value Ref Range    Potassium 3.3 3.3 - 4.7 mmol/L     *Note: Due to a large number of results and/or encounters for the requested time period, some results have not been displayed. A complete set of results can be found in Results Review.     Imaging  XR chest 1 view    Result Date: 12/29/2024  Interpreted By:  John Marvin and Baker Zachary STUDY: XR CHEST 1 VIEW; ;  12/29/2024 6:24 pm   INDICATION: Signs/Symptoms:respiratory distress.   COMPARISON: Chest radiograph on 05/10/2024.   ACCESSION NUMBER(S): [id number]   ORDERING CLINICIAN: JUAN M MARVIN   FINDINGS: Single AP view of the chest.    shunt catheter tubing traverses along soft tissues of the right neck, right hemithorax, and right hemiabdomen, with tip outside the field of view. PDA occlusion device again noted in stable position.   The cardiomediastinal silhouette is in size and configuration.   Perihilar and interstitial prominence throughout the bilateral lungs. Suggestion of trace bilateral pleural effusions. No focal consolidation or pneumothorax.   No acute osseous abnormality.       1. Mild perihilar and interstitial  prominence throughout the bilateral lungs. This is felt to be low lung volumes. Underlying pneumonia not categorically excluded. 2. Suggestion of trace bilateral pleural effusions. 3. Medical devices as above.   I personally reviewed the images/study and I agree with the findings as stated by Dr. Felipe Albright M.D. This study was interpreted at [place], [place].   MACRO: None   Signed by: John Erwin 12/29/2024 8:42 PM Dictation workstation:   [id number]    XR hips bilateral 2 VW w pelvis when performed    Result Date: 12/12/2024  * * *Final Report* * * DATE OF EXAM: Dec 12 2024  1:25PM   MTX   5603  -  XR PED PELV 2V AP/FROG HIPS BONI  / ACCESSION #  [id number] PROCEDURE REASON: multiple diagnoses      * * * * Physician Interpretation * * * *  EXAM:  XR PED PELV 2V AP/FROG HIPS BONI -- TECHNIQUE:  AP and frog lateral radiographs of the pelvis EXAM DATE:  12/12/2024 1:25 PM CLINICAL HISTORY:  Left hip subluxation, initial encounter ([facility]) Hip subluxation, right, initial encounter ([facility]) Infantile cerebral palsy (HCC) COMPARISON:  06/26/2024 FINDINGS:    shunt catheter loops over the right lower quadrant. The hips are valgus in configuration.  There is lateral uncovering of bilateral femoral heads, left worse than right.  On the frog-lateral view, there is improved positioning of the right femoral head with persistent lateral subluxation on the left.  Bilateral acetabula are shallow in configuration, left greater than right. There is large stool projecting over the iliac bones and sacrum.    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 : PSCB   Transcribe Date/Time: Dec 12 2024  1:30P Dictated by : JOSEPH PATEL DO This examination was interpreted and the report reviewed and electronically signed by: JOSEPH PATEL DO on Dec 12 2024  1:32PM  EST      Assessment /Plan      David is a 4-year-old  boy with acute hypoxic respiratory failure likely due to RSV bronchiolitis and reactive airway disease.  Given his clinical picture, we have a lower suspicion for bacterial pneumonia at this time, and will otherwise treat him clinically.  He requires ICU level care due to acute hypoxic respiratory failure.    Plan:     Neurology:  - Continue home clobazam  - Continue home clonazepam  - Continue home Keppra  - Tylenol as needed    Cardiovascular:  - Administer fluid bolus, monitor for improvement of tachycardia    Pulmonary:  - Continuous albuterol  - Every 6 hours Atrovent  - Solu-Medrol every 6 hours  - Wean per asthma care pathway    FEN/GI:  - N.p.o. with maintenance IV fluids pending improvement in respiratory status    Hematology/ID:  - Status post vancomycin, ceftriaxone, azithromycin  - RSV positive  - Will hold further antimicrobials at this time    Social:  - Family at bedside    TERESA Mohan MD, MEd, PGY-6  Peds CCM Fellow

## 2025-06-01 NOTE — PLAN OF CARE
Problem: Adult Inpatient Plan of Care  Goal: Plan of Care Review  Outcome: Progressing  Flowsheets (Taken 6/1/2025 1808)  Progress: improving  Outcome Evaluation: Patient resting in bed.  Family at bedside.  Patient AAOx3 and forgetful.  Reoriented to time and place several times.  Patient yells out throughout the day.  Bed alarm in place.  Call light in reach.  Plan of Care Reviewed With: patient   Plan of Care Review  Plan of Care Reviewed With: patient  Progress: improving  Outcome Evaluation: Patient resting in bed.  Family at bedside.  Patient AAOx3 and forgetful.  Reoriented to time and place several times.  Patient yells out throughout the day.  Bed alarm in place.  Call light in reach.

## 2025-06-01 NOTE — NURSING NOTE
Patient resting in bed.  Assessment complete.  Meds given.  Bed alarm in place.  Will continue to monitor

## 2025-06-01 NOTE — PROGRESS NOTES
Fr. Meza administered Sacrament of the Sick to Religion patient as well as providing the Eucharist as requested by patient. - Charted by Rev. Tres Dwyer, Spiritual Care Coordinator, [phone] [phone]

## 2025-06-01 NOTE — PROGRESS NOTES
Hospice Follow Up Progress Note      Patient Name: Samy Elena                                                                                        Patient MRN: [id number]  Date / Time Note Created: 6/1/2025 5:48 PM  Attending Physician: Nicolas Lee DO  Primary Care Physician: Zachery Hernandez MD     Reason for Consult:  Hospice Evaluation    Code Status History:  Current Code Status: DNR (A.N.D.)    Presybeterian:  Presybeterian    Allergies:  Allergies   Allergen Reactions    Jardiance [Empagliflozin] Other (see comments)     denise's gangrene       Advance Directives:  Emergency Contact: HalmikecaMarko; Home Phone: [phone]; Relationship: Son; MCKAYLA ELENA; Relationship: Son; Kassy,Watson; Relationship: Son; LorettaMarsha; Relationship: Grandchild    Plan  Patient pleasantly confused, not requiring prn medications for pain or discomfort. Does not meet GIP criteria at this time. Spoke with son Marko who is also POA via phone. Discussed home hospice vs.LTC on hospice. Patient currently has a 24 hour caregiver at home. Son would like  to supply a list of facilities contracted with [facility] to look into.     We will continue to follow.  Please call with any questions or concerns.    Carolina Jackson MSN, RN  Hospice Liaison   [facility]  [address] [place]   [address]  Call [phone] 24 hours a day for questions or concerns  E-Mail:  [email or web address]  Cell: [phone]

## 2025-06-01 NOTE — PROGRESS NOTES
Hospital Medicine     Daily Progress Note       SUBJECTIVE   Patient seen. Resting in bed comfortably. Left VM for Marko. Spoke to Ethan on telephone. They are awaiting to hear from hospice to discuss options home with 24/7 care vs. SNF     OBJECTIVE   Vital signs in last 24 hours:  Temp:  [36.6 °C (97.8 °F)] 36.6 °C (97.8 °F)  Heart Rate:  [69] 69  Resp:  [15-16] 16  BP: (103-117)/(66-70) 103/70  No intake or output data in the 24 hours ending 06/01/25 1001    Physical Exam  Constitutional:       Appearance: He is ill-appearing.   HENT:      Head: Normocephalic and atraumatic.      Mouth/Throat:      Mouth: Mucous membranes are dry.   Eyes:      Pupils: Pupils are equal, round, and reactive to light.   Cardiovascular:      Rate and Rhythm: Regular rhythm.      Heart sounds: Murmur heard.   Pulmonary:      Effort: normal  Diminished BS bilaterally  Abdominal:      Palpations: Abdomen is soft.   Musculoskeletal:      Right lower leg: Edema present.      Left lower leg: Edema present.   Neurological:      Mental Status: He is alert. He is confused.      LINES, DRAINS, AIRWAYS, WOUNDS   Peripheral IV (Adult) 05/30/25 Anterior;Right Forearm (Active)   Number of days: 2       Peripheral IV (Adult) 05/30/25 Anterior;Left Forearm (Active)   Number of days: 2       Wound Excoriation Penis (Active)   Number of days: 8          LABS, IMAGING, TELEMETRY   CHEMISTRIES   Results from last 7 days   Lab Units 05/30/25  1325 05/28/25  0607 05/27/25  1003 05/26/25  0441 05/25/25  1200 05/25/25  1200   SODIUM mEQ/L 136 138 138 142  --  142   POTASSIUM mEQ/L 4.4 4.4 4.1 3.8  --  3.9   CHLORIDE mEQ/L 107 109* 107 110*  --  111*   CO2 mEQ/L 21 22 22 22  --  23   BUN mg/dL 29* 30* 30* 30*  --  28*   CREATININE mg/dL 1.7* 1.7* 1.9* 1.9*  --  1.6*   GLUCOSE mg/dL 172* 136* 205* 87  --  149*   CALCIUM mg/dL 8.2* 8.0* 7.7* 7.9*  --  8.1*   EGFR mL/min/1.73m*2 37.1* 37.1* 32.5* 32.5*  --  39.9*   ANION GAP mEQ/L 8 7 9 10  --  8    MAGNESIUM mg/dL 2.2 1.8 1.8 1.8   < >  --     < > = values in this interval not displayed.     HEPATIC FUNCTION TESTS   Results from last 7 days   Lab Units 05/30/25  1325 05/26/25  0441   AST IU/L 30 24   ALT IU/L 4* 4*   ALK PHOS IU/L 85 76   ALBUMIN g/dL 2.8* 2.7*   PROTEIN TOTAL g/dL 7.0 6.6   BILIRUBIN TOTAL mg/dL 0.8 0.9     CBC RESULTS   Results from last 7 days   Lab Units 05/30/25  1325 05/28/25  0607 05/26/25  0441 05/25/25  1200   WBC K/uL 5.70 4.52 4.50 5.04   HEMOGLOBIN g/dL 8.6* 8.1* 8.3* 8.5*   HEMATOCRIT % 28.4* 26.1* 26.9* 27.6*   PLATELETS K/uL 95* 83* 82* 85*     ANEMIA STUDIES     COAGULATION   Results from last 7 days   Lab Units 05/28/25  0607   PROTIME sec 19.6*   INR  1.7       Radiology Reports in last 24hNo results found.         ASSESSMENT AND PLAN     Progressive debility with functional decline   Comfort Measures  -Palliative care consult  -GOC discussion initiated, son in agreement to proceed after meeting team  -Pt w/ multiple progressive chronic conditions and high symptom burden  -Noted increased fatigue, anorexia, and functional decline after recent discharge  -Continue supportive care     Recurrent pleural effusions   -Left > right; last thoracentesis 5/28 with good effect  -Currently saturating well on RA, no distress  -Family not wanting any intervention     -CKD stage 3b   -Cr now 1.7,baseline 1.5, improving since last admission  -Likely prerenal component from infection, diuretic use  -Hold torsemide, reassess outpatient with BMP  -Avoid nephrotoxins     Atrial fibrillation  -Not anticoagulated due to recurrent GIB  -Rate controlled on metoprolol  -Continue current regimen     Chronic anemia  Chronic thrombocytopenia  -Hgb stable at 8.6, Plts 95K,  , no signs of overt GIB   -Suspect underlying MDS  -No acute intervention indicated     CAD  HFrEF (EF 40-45%)  -S/P CABG, on aspirin monotherapy  -Torsemide on hold given Cr and euvolemia  -Continue aspirin, monitor for  symptoms     Hypothyroidism  -Continue levothyroxine 100mcg daily     BPH  -Continue home tamsulosin and finasteride  -Daily bladder scans inpatient  -Urology follow-up recommended       VTE Prophylaxis:  Current anticoagulants:    None      Code Status: DNR (A.N.D.)        Estimated Discharge Date: 6/2/2025   Disposition Planning: pending hospice discussion       Nicolas Lee DO  6/1/2025

## 2025-06-02 NOTE — PROGRESS NOTES
Hospice Follow Up Progress Note      Patient Name: Samy Elena                                                                                        Patient MRN: [id number]  Date / Time Note Created: 6/2/2025 3:58 PM  Attending Physician: Nicolas Lee DO  Primary Care Physician: Zachery Hernandez MD     Reason for Consult:  Hospice Evaluation    Code Status History:  Current Code Status: DNR (A.N.D.)    Confucianist:  Spiritism    Allergies:  Allergies   Allergen Reactions    Jardiance [Empagliflozin] Other (see comments)     denise's gangrene       Advance Directives:  Emergency Contact: Marko Elena; Home Phone: [phone]; Relationship: Son; MCKAYLA ELENA; Relationship: Son; Kassy,Watson; Relationship: Son; Marsha Burgos; Relationship: Grandchild    Plan  Upon assessment today patient does not meet criteria for GIP LOC. Spoke with son over the phone family is undecided on home with routine vs. LTC placement. Family requesting to speak with bedside SW. Bedside SW updated and will reach out to family. Hospice will follow up with family tomorrow. We will continue to follow.Please call with any questions or concerns.    Rhoda Shaw RN  Hospice Liaison at [facility]  [facility]  [address] [place]   [address]  Call [phone] 24 hours a day for questions or concerns

## 2025-06-02 NOTE — PLAN OF CARE
Care Coordination Admission Assessment Note    General Information:  Readmission Within the last 30 days:    Does patient have a : No  Patient-Specific Goals (include timeframe):      Living Arrangements:  Arrived From: home  Current Living Arrangements: home  People in Home: other (see comments)  Home Accessibility: ramp to enter home  Living Arrangement Comments:      Housing Stability and Utility Access (SDOH):  In the last 12 months, was there a time when you were not able to pay the mortgage or rent on time?: No  In the past 12 months, how many times have you moved?:    At any time in the past 12 months, were you homeless or living in a shelter (including now)?: No  In the past 12 months has the electric, gas, oil, or water company threatened to shut off services in your home?: No    Functional Status Prior to Admission:   Assistive Device/Animal Currently Used at Home: hospital bed, walker, front-wheeled, wheelchair, ramps  Functional Status Comments:    IADL Comments:       Supports and Services:  Current Outpatient/Agency/Support Group: none  Type of Current Home Care Services:    History of home care episode or rehab stay:      Discharge Needs Assessment:   Concerns to be Addressed: discharge planning  Current Discharge Risk: none  Anticipated Changes Related to Illness: none    Patient/Family Anticipated Discharge Plan:  Patient/Family Anticipates Transition To: home with help/services  Patient/Family Anticipated Services at Transition: hospice care    Connection to Community       Patient Choice:   Offered/Gave Vendor List:         Anticipated Discharge Plan:  Met with patient. Provided education and contact information for Care Coordination services.: yes  Anticipated Discharge Disposition: nursing home/snf care, home with home health     Transportation Needs (SDOH):  Transportation Concerns: none  Transportation Anticipated: health plan transportation  Is Out of Hospital DNR  needed at discharge?:      In the past 12 months, has lack of transportation kept you from medical appointments or from getting medications?: No  In the past 12 months, has lack of transportation kept you from meetings, work, or from getting things needed for daily living?: No    Concerns - comments: Patient lives in a two story home with atwenty four hour care giver. Patient has a ramp to enter the home and resides on the first floor of the two story home. Patient utilizes a hospital bed, wheelchair, walker, ostomy bag and a nebulizer as needed. Patient POA is his son. Patient's community pharamcy is [facility]. Patient has recieved inpatient rehab services at [facility]. Patient is known to [facility] services. Patient is a  and does not recieve 's benefits. SW will remain available for emotional support and disposition planning. MESHA Torres [phone]    Care Coordination Discharge Plan Note     Discharge Needs Assessment  Concerns to be Addressed: discharge planning  Current Discharge Risk: none    Anticipated Discharge Plan  Anticipated Discharge Disposition: nursing home/FPC care, home with home health       Patient Choice  Offered/Gave Vendor List:         ---------------------------------------------------------------------------------------------------------------------    Interdisciplinary Discharge Plan Review:  Participants:     Concerns Comments: Per info during medical rounds today, Patient is on comfort measures at this time time. MARY spoke with patient's son about d/c planning. SW informed son that patient does not meet inpatient ccriteria for inpatient hospice services. Son is sure if family wants patient to d/c to LTC or home with hospice services. Son informed SW that he will discuss options with his siblings and requested MARY to send referrals to facilities within ten miles from [place]. MARY sent referrals and is awaiting responses. MARY will remain available for  emotional support and disposition planning. Diana Gaviria, MESHA [phone]      Discharge Plan:   Disposition/Destination:   /    Discharge Facility:    Community Resources:      Discharge Transportation:  Is Out of Hospital DNR needed at Discharge:    Does patient need discharge transport?

## 2025-06-02 NOTE — PROGRESS NOTES
Hospital Medicine     Daily Progress Note       SUBJECTIVE   Patient seen. No new complaints. AAOx2-3     OBJECTIVE   Vital signs in last 24 hours:  Temp:  [36.4 °C (97.5 °F)-36.6 °C (97.8 °F)] 36.4 °C (97.5 °F)  Heart Rate:  [68-73] 68  Resp:  [16-18] 16  BP: (105-106)/(59-62) 105/59  No intake or output data in the 24 hours ending 06/02/25 1148    Physical Exam  Constitutional:       Appearance: He is ill-appearing.   HENT:      Head: Normocephalic and atraumatic.      Mouth/Throat:      Mouth: Mucous membranes are dry.   Eyes:      Pupils: Pupils are equal, round, and reactive to light.   Cardiovascular:      Rate and Rhythm: Regular rhythm.      Heart sounds: Murmur heard.   Pulmonary:      Effort: normal  Diminished BS bilaterally  Abdominal:      Palpations: Abdomen is soft.   Musculoskeletal:      Right lower leg: Edema present.      Left lower leg: Edema present.   Neurological:      Mental Status: He is alert. He is more oriented today     LINES, DRAINS, AIRWAYS, WOUNDS   Peripheral IV (Adult) 05/30/25 Anterior;Right Forearm (Active)   Number of days: 3       Peripheral IV (Adult) 05/30/25 Anterior;Left Forearm (Active)   Number of days: 3       Wound Excoriation Penis (Active)   Number of days: 9          LABS, IMAGING, TELEMETRY   CHEMISTRIES   Results from last 7 days   Lab Units 05/30/25  1325 05/28/25  0607 05/27/25  1003   SODIUM mEQ/L 136 138 138   POTASSIUM mEQ/L 4.4 4.4 4.1   CHLORIDE mEQ/L 107 109* 107   CO2 mEQ/L 21 22 22   BUN mg/dL 29* 30* 30*   CREATININE mg/dL 1.7* 1.7* 1.9*   GLUCOSE mg/dL 172* 136* 205*   CALCIUM mg/dL 8.2* 8.0* 7.7*   EGFR mL/min/1.73m*2 37.1* 37.1* 32.5*   ANION GAP mEQ/L 8 7 9   MAGNESIUM mg/dL 2.2 1.8 1.8     HEPATIC FUNCTION TESTS   Results from last 7 days   Lab Units 05/30/25  1325   AST IU/L 30   ALT IU/L 4*   ALK PHOS IU/L 85   ALBUMIN g/dL 2.8*   PROTEIN TOTAL g/dL 7.0   BILIRUBIN TOTAL mg/dL 0.8     CBC RESULTS   Results from last 7 days   Lab Units  05/30/25  1325 05/28/25  0607   WBC K/uL 5.70 4.52   HEMOGLOBIN g/dL 8.6* 8.1*   HEMATOCRIT % 28.4* 26.1*   PLATELETS K/uL 95* 83*     ANEMIA STUDIES     COAGULATION   Results from last 7 days   Lab Units 05/28/25  0607   PROTIME sec 19.6*   INR  1.7       Radiology Reports in last 24hNo results found.         ASSESSMENT AND PLAN     Progressive debility with functional decline   Comfort Measures  -Palliative care consult  -GOC discussion initiated, son in agreement to proceed after meeting team  -Pt w/ multiple progressive chronic conditions and high symptom burden  -Noted increased fatigue, anorexia, and functional decline after recent discharge  -Continue supportive care     Recurrent pleural effusions   -Left > right; last thoracentesis 5/28 with good effect  -Currently saturating well on RA, no distress  -Family not wanting any intervention     -CKD stage 3b   -Cr now 1.7,baseline 1.5, improving since last admission  -Likely prerenal component from infection, diuretic use  -Hold torsemide, reassess outpatient with BMP  -Avoid nephrotoxins     Atrial fibrillation  -Not anticoagulated due to recurrent GIB  -Rate controlled on metoprolol  -Continue current regimen     Chronic anemia  Chronic thrombocytopenia  -Hgb stable at 8.6, Plts 95K,  , no signs of overt GIB   -Suspect underlying MDS  -No acute intervention indicated     CAD  HFrEF (EF 40-45%)  -S/P CABG, on aspirin monotherapy  -Torsemide on hold given Cr and euvolemia  -Continue aspirin, monitor for symptoms     Hypothyroidism  -Continue levothyroxine 100mcg daily     BPH  -Continue home tamsulosin and finasteride  -Daily bladder scans inpatient  -Urology follow-up recommended          VTE Prophylaxis:  Current anticoagulants:    None      Code Status: DNR (A.N.D.)        Estimated Discharge Date: 6/3/2025   Disposition Planning: pending outpatient hospice arrangements       Nicolas Lee DO  6/2/2025

## 2025-06-02 NOTE — PLAN OF CARE
Problem: Adult Inpatient Plan of Care  Goal: Plan of Care Review  Outcome: Progressing  Flowsheets (Taken 6/2/2025 1227)  Progress: no change  Outcome Evaluation: Pt resting comfortably, frequently agitated when ready for his meals, fall safety precautions maintained, call bell in reach  Plan of Care Reviewed With: patient   Plan of Care Review  Plan of Care Reviewed With: patient  Progress: no change  Outcome Evaluation: Pt resting comfortably, frequently agitated when ready for his meals, fall safety precautions maintained, call bell in reach

## 2025-06-03 NOTE — PROGRESS NOTES
Spoke to pt's son, Marko.   Family is leaning towards discharge to a LTC facility.   Son is working with A SHIELA Gaviria, to find a facility.    Family is undecided about hospice, at this time.     Hospice will continue to follow while pt remains at [facility].

## 2025-06-03 NOTE — PLAN OF CARE
Problem: Adult Inpatient Plan of Care  Goal: Plan of Care Review  Outcome: Progressing  Flowsheets (Taken 6/3/2025 8554)  Progress: no change  Outcome Evaluation: patient is AAOx3, periods of confusion/forgetfullness. resting in bed comfortably, right hearing aid in but not working, increased cough, medication and cough drop given, fall safety precautions maintained  Plan of Care Reviewed With: patient   Plan of Care Review  Plan of Care Reviewed With: patient  Progress: no change  Outcome Evaluation: patient is AAOx3, periods of confusion/forgetfullness. resting in bed comfortably, right hearing aid in but not working, increased cough, medication and cough drop given, fall safety precautions maintained

## 2025-06-03 NOTE — PROGRESS NOTES
Hospital Medicine     Daily Progress Note       SUBJECTIVE   Patient seen. No new complaints. AAox2     OBJECTIVE   Vital signs in last 24 hours:  Temp:  [36.3 °C (97.4 °F)-36.6 °C (97.8 °F)] 36.3 °C (97.4 °F)  Heart Rate:  [66-85] 85  Resp:  [16] 16  BP: ()/(54-62) 99/62    Intake/Output Summary (Last 24 hours) at 6/3/2025 1205  Last data filed at 6/3/2025 0645  Gross per 24 hour   Intake --   Output 0 ml   Net 0 ml       Physical Exam  Constitutional:       Appearance: He is ill-appearing. Rest in bed, comfortable  HENT:      Head: Normocephalic and atraumatic.      Mouth/Throat:      Mouth: Mucous membranes are dry.   Eyes:      Pupils: Pupils are equal, round, and reactive to light.   Cardiovascular:      Rate and Rhythm: Regular rhythm.      Heart sounds: Murmur heard.   Pulmonary:      Effort: normal  Diminished BS bilaterally  Abdominal:      Palpations: Abdomen is soft.   Musculoskeletal:      Right lower leg: Edema present.      Left lower leg: Edema present.   Neurological:      Mental Status: He is alert. AAOx2   LINES, DRAINS, AIRWAYS, WOUNDS   Peripheral IV (Adult) 05/30/25 Anterior;Right Forearm (Active)   Number of days: 4       Peripheral IV (Adult) 05/30/25 Anterior;Left Forearm (Active)   Number of days: 4       Wound Excoriation Penis (Active)   Number of days: 10          LABS, IMAGING, TELEMETRY                ASSESSMENT AND PLAN     Progressive debility with functional decline   Comfort Measures  -Palliative care consult  -GOC discussion initiated, son in agreement to proceed after meeting team  -Pt w/ multiple progressive chronic conditions and high symptom burden  -Noted increased fatigue, anorexia, and functional decline after recent discharge  -Continue supportive care     Recurrent pleural effusions   -Left > right; last thoracentesis 5/28 with good effect  -Currently saturating well on RA, no distress  -Family not wanting any intervention     -CKD stage 3b   -Cr now  1.7,baseline 1.5, improving since last admission  -Likely prerenal component from infection, diuretic use  -Hold torsemide, reassess outpatient with BMP  -Avoid nephrotoxins     Atrial fibrillation  -Not anticoagulated due to recurrent GIB  -Rate controlled on metoprolol  -Continue current regimen     Chronic anemia  Chronic thrombocytopenia  -Hgb stable at 8.6, Plts 95K,  , no signs of overt GIB   -Suspect underlying MDS  -No acute intervention indicated     CAD  HFrEF (EF 40-45%)  -S/P CABG, on aspirin monotherapy  -Torsemide on hold given Cr and euvolemia  -Continue aspirin, monitor for symptoms     Hypothyroidism  -Continue levothyroxine 100mcg daily     BPH  -Continue home tamsulosin and finasteride  -Daily bladder scans inpatient  -Urology follow-up recommended          VTE Prophylaxis:  Current anticoagulants:    None      Code Status: DNR (A.N.D.)        Estimated Discharge Date: 6/5/2025   Disposition Planning: MARCELLUS Lee DO  6/3/2025

## 2025-06-04 PROBLEM — R06.00 DYSPNEA: Status: ACTIVE | Noted: 2025-04-03

## 2025-06-04 NOTE — ASSESSMENT & PLAN NOTE
-in the setting of increased secretions, known heart failure  -would add glycopyrrolate 0.2 mg q4hr scheduled IV, consider scopolamine patch  -can order SL morphine 5 mg q4hr PRN for dyspnea not controlled by other meds/interventions  -can order SL ativan 1 mg q4hr PRN anxiety

## 2025-06-04 NOTE — ASSESSMENT & PLAN NOTE
89 y.o. with a PMH of HFrEF, TR, afib, recurrent pleural effusion, recurrent GIB, recurrent UTI presenting with worsening fatigue and lethargy     - Code status: Do Not Resuscitate / Allow Natural Death  - Prognosis: weeks to months   - Advance Directives: yes  - Surrogate Decision Maker: kaykay Zhu is healthcare agent   - Capacity unreliable   - Spiritual & Existential Needs: none identified

## 2025-06-04 NOTE — PROGRESS NOTES
Hospital Medicine     Daily Progress Note       SUBJECTIVE   Patient seen. Had more cough and congestion over night. Unable to swallow mucinex pill. Resting in bed comfortably at the moment.    Updated Marko Elena via telephone. He reports [facility] will be in today to evaluate Samy for their facility.      OBJECTIVE   Vital signs in last 24 hours:  Temp:  [36.5 °C (97.7 °F)-36.6 °C (97.9 °F)] 36.5 °C (97.7 °F)  Heart Rate:  [67-84] 81  Resp:  [16-20] 20  BP: (103-110)/(58-61) 110/61    Intake/Output Summary (Last 24 hours) at 6/4/2025 1020  Last data filed at 6/4/2025 0515  Gross per 24 hour   Intake 120 ml   Output 200 ml   Net -80 ml       Physical Exam  Constitutional:       Appearance: He is ill-appearing. Rest in bed, comfortable  HENT:      Head: Normocephalic and atraumatic.       Eyes:      Pupils: Pupils are equal, round, and reactive to light.   Cardiovascular:      Rate and Rhythm: Regular rhythm.      Heart sounds: Murmur heard.   Pulmonary:      Effort: normal  Diminished with upper airway rhonchi  Abdominal:      Palpations: Abdomen is soft.   Musculoskeletal:      Right lower leg: Edema present.      Left lower leg: Edema present.   Neurological:      Mental Status: He is somnolent     LINES, DRAINS, AIRWAYS, WOUNDS   Peripheral IV (Adult) 05/30/25 Anterior;Right Forearm (Active)   Number of days: 5       Peripheral IV (Adult) 05/30/25 Anterior;Left Forearm (Active)   Number of days: 5          LABS, IMAGING, TELEMETRY                ASSESSMENT AND PLAN   Progressive debility with functional decline   Comfort Measures  -Palliative care consult  -Pt w/ multiple progressive chronic conditions and high symptom burden  -Noted increased fatigue, anorexia, and functional decline after recent discharge  -Continue supportive care  - Add robitussin and tessalon for cough and congestion.   - Diet as tolerated, aspiration precautions     Recurrent pleural effusions   -Left > right; last  thoracentesis 5/28 with good effect  -Currently saturating well on RA, no distress  -Family not wanting any intervention     -CKD stage 3b   -Cr now 1.7,baseline 1.5, improving since last admission  -Likely prerenal component from infection, diuretic use  -Hold torsemide, reassess outpatient with BMP  -Avoid nephrotoxins     Atrial fibrillation  -Not anticoagulated due to recurrent GIB  -Rate controlled on metoprolol  -Continue current regimen     Chronic anemia  Chronic thrombocytopenia  -Hgb stable at 8.6, Plts 95K,  , no signs of overt GIB   -Suspect underlying MDS  -No acute intervention indicated     CAD  HFrEF (EF 40-45%)  -S/P CABG, on aspirin monotherapy  -Torsemide on hold given Cr and euvolemia  -Continue aspirin, monitor for symptoms     Hypothyroidism  -Continue levothyroxine 100mcg daily     BPH  -Continue home tamsulosin and finasteride  -Daily bladder scans inpatient  -Urology follow-up recommended       VTE Prophylaxis:  Current anticoagulants:    None      Code Status: DNR (A.N.D.)        Estimated Discharge Date: 6/5/2025   Disposition Planning: pending LTC       Nicolas Lee DO  6/4/2025

## 2025-06-04 NOTE — PLAN OF CARE
Problem: Adult Inpatient Plan of Care  Goal: Plan of Care Review  Outcome: Progressing  Flowsheets (Taken 6/4/2025 1122)  Progress: no change  Outcome Evaluation: Awake and alert, agitated this morning. Moist cough increased vomited small amount of mucous and bile, cough decreased with neb and robitussin. Colostomy replaced, kathleen area cleansed/cream applied. Fall safety precautions maintained. Call bell within reach, resting comfortably.  Plan of Care Reviewed With: patient   Plan of Care Review  Plan of Care Reviewed With: patient  Progress: no change  Outcome Evaluation: Awake and alert, agitated this morning. Moist cough increased vomited small amount of mucous and bile, cough decreased with neb and robitussin. Colostomy replaced, kathleen area cleansed/cream applied. Fall safety precautions maintained. Call bell within reach, resting comfortably.

## 2025-06-04 NOTE — PLAN OF CARE
Care Coordination Discharge Plan Note     Discharge Needs Assessment  Concerns to be Addressed: discharge planning  Current Discharge Risk: none    Anticipated Discharge Plan  Anticipated Discharge Disposition: nursing home/jail care, home with home health       Patient Choice  Offered/Gave Vendor List:         ---------------------------------------------------------------------------------------------------------------------    Interdisciplinary Discharge Plan Review:  Participants:     Concerns Comments: Per info during medical rounds today, Patient remains on comfort measures at this time. MARY spoke with patient's son cuba on this date. MARY informed son that patient had several facilities interested in possibly admitting patient. MARY informed son that [facility] and [facility] were interested in patient but required additional information from family. MARY was informd that family was interested in [facility]. MARY then contacted Sahil of [facility] @ [phone] and was requested to send patient's clinicals. MARY faxed clinicals to [phone]. MARY also scanned a completed DME formed to Sahil via email [email or web address]. MARY was informed that patient would recieve an onsite assessment today to determine if patient was appropriate for admission. MARY made medical team aware. MARY will remain available for emotional support and disposition planning. Diana Gaviria, MSPASCUAL [phone]      Discharge Plan:   Disposition/Destination:   /    Discharge Facility:    Community Resources:      Discharge Transportation:  Is Out of Hospital DNR needed at Discharge:    Does patient need discharge transport?

## 2025-06-04 NOTE — PROGRESS NOTES
"Palliative Care Progress Note    This is Hospital Day: 6    Conversation/Goals of Care:  Per chart review on comfort care with plans for hospice at facility    Assessment & Plan  Debility  - Debility likely multifactorial in the setting of HFrEF, TR, afib, recurrent pleural effusion, recurrent GIB.    - Living situation prior to hospitalization home with caregiver.   - Baseline functional status is wheelchair for ambulation.   - Current Palliative Performance Status: 40%  - Baseline Palliative Performance Status: 40%   - Patient remains high risk for further deterioration and functional decline.     Plan:  - continue current plan of care  - Palliative care will continue to follow for complex medical decision making     Palliative care by specialist  89 y.o. with a PMH of HFrEF, TR, afib, recurrent pleural effusion, recurrent GIB, recurrent UTI presenting with worsening fatigue and lethargy     - Code status: Do Not Resuscitate / Allow Natural Death  - Prognosis: weeks to months   - Advance Directives: yes  - Surrogate Decision Maker: kaykay Zhu is healthcare agent   - Capacity unreliable   - Spiritual & Existential Needs: none identified     Dyspnea  -in the setting of increased secretions, known heart failure  -would add glycopyrrolate 0.2 mg q4hr scheduled IV, consider scopolamine patch  -can order SL morphine 5 mg q4hr PRN for dyspnea not controlled by other meds/interventions  -can order SL ativan 1 mg q4hr PRN anxiety    Interval History (Subjective)    Source: chart review, patient     He is awake, ill appearing, says \"my breathing is terrible,\" notes increasing secretions and cough. Feels that nebulizer has helped, agrees with starting medications to dry out secretions.    Current Medications:  Current Facility-Administered Medications   Medication Dose Route Frequency Provider Last Rate Last Admin    benzocaine-menthol (CEPACOL/CHLORASEPTIC) lozenge 1 lozenge  1 lozenge Mouth/Throat q2h PRN Isha García, " CRNP   1 lozenge at 06/04/25 0415    benzonatate (TESSALON) capsule 200 mg  200 mg oral 3x daily PRN Nicolas Lee, DO   200 mg at 06/03/25 1605    buPROPion XL (WELLBUTRIN XL) 24 hr ER tablet 150 mg  150 mg oral Daily Rick Guerrero MD   150 mg at 06/04/25 0848    carboxymethylcellulose (REFRESH PLUS) 0.5 % ophthalmic dropperette 1 drop  1 drop Both Eyes q2h while awake Rick Guerrero MD   1 drop at 06/04/25 1148    finasteride (PROSCAR) tablet 5 mg  5 mg oral Daily Rick Guerrero MD   5 mg at 06/04/25 0848    glycopyrrolate (ROBINUL) injection 0.1 mg  0.1 mg intravenous Once Nicolas Lee,         glycopyrrolate (ROBINUL) injection 0.4 mg  0.4 mg intravenous q4h IVÁN Nicolas Lee, DO   0.4 mg at 06/04/25 1345    guaiFENesin (ROBITUSSIN) 100 mg/5 mL liquid 200 mg  200 mg oral q4h PRN Nicolas Lee, DO   200 mg at 06/04/25 1034    ipratropium-albuteroL (DUO-NEB) 0.5-2.5 mg/3 mL nebulizer solution 3 mL  3 mL nebulization q6h IVÁN Nicolas Lee, DO   3 mL at 06/04/25 1334    latanoprost (XALATAN) 0.005 % ophthalmic solution 1 drop  1 drop Both Eyes Nightly Rick Guerrero MD   1 drop at 06/03/25 2330    LORazepam (ATIVAN) 2 mg/mL concentrated solution 1 mg  1 mg oral q4h PRN Nicolas eLe, DO        melatonin capsule 10 mg  10 mg oral Nightly Rick Guerrero MD   10 mg at 06/03/25 2044    metoprolol succinate ER (TOPROL-XL) pre-halved 24 hr ER tablet 12.5 mg  12.5 mg oral Daily Rick Guerrero MD        morphine concentrate 10 mg/0.5 mL concentrated solution 5 mg  5 mg oral q4h PRN Nicolas Lee, DO   5 mg at 06/04/25 1346    pantoprazole (PROTONIX) tablet,delayed release (DR/EC) 20 mg  20 mg oral Daily Rick Guerrero MD   20 mg at 06/04/25 0848    sodium chloride PF flush 3 mL  3 mL intravenous PRN Nicolas Lee DO        tamsulosin (FLOMAX) 24 hr ER capsule 0.4 mg  0.4 mg oral q AM Rick Guerrero MD   0.4 mg at 06/04/25 0848       Objective    Physical  Exam:  Physical Exam  Vitals reviewed.   Constitutional:       General: He is in acute distress.      Appearance: He is ill-appearing.   HENT:      Head: Normocephalic.      Mouth/Throat:      Mouth: Mucous membranes are moist.   Eyes:      General: No scleral icterus.  Cardiovascular:      Rate and Rhythm: Normal rate.   Pulmonary:      Effort: Respiratory distress present.   Abdominal:      General: There is no distension.   Skin:     General: Skin is dry.   Neurological:      General: No focal deficit present.      Mental Status: He is alert.   Psychiatric:         Behavior: Behavior normal.         Vitals:  Vitals:    06/04/25 1346   BP:    Pulse:    Resp: (!) 22   Temp:    SpO2:        Laboratory Studies:    No new labs.    Imaging and Other Studies:     I have independently reviewed the pertinent imaging to the time of note and agree with reported results.    I have independently reviewed the pertinent cardiac studies to the time of note and agree with reported results.    Shital Mi MD  Office [phone]

## 2025-06-04 NOTE — ASSESSMENT & PLAN NOTE
- Debility likely multifactorial in the setting of HFrEF, TR, afib, recurrent pleural effusion, recurrent GIB.    - Living situation prior to hospitalization home with caregiver.   - Baseline functional status is wheelchair for ambulation.   - Current Palliative Performance Status: 40%  - Baseline Palliative Performance Status: 40%   - Patient remains high risk for further deterioration and functional decline.     Plan:  - continue current plan of care  - Palliative care will continue to follow for complex medical decision making

## 2025-06-04 NOTE — PROGRESS NOTES
Pt remains on Comfort Care.  Upon assessment today, pt does not meet criteria for IP Hospice lOC.   Currently, there are no symptom mgt issues or challenges.     Pt's son is working with LSW to find LTC placement.    Pt's children remain undecided about routine hospice care at the facility.     Hospice will continue to follow.

## 2025-06-04 NOTE — NURSING NOTE
Pt requesting to shave with his electric razor but could not find it in the pt's room. Pt became upset and requested for RN to call his son to check on the shaver. Called Marko (son) and he stated shaver was at the pt's house. Informed pt of above.

## 2025-06-04 NOTE — PROGRESS NOTES
Chaplain Garcia met patient at beside for initial consult; son was at bedside as well. Patient was struggling with cough. Spiritual care options were explained and welcomed. Care options will continue to be available as needed or desired.  - Chaplain Paul Intern [phone]

## 2025-06-04 NOTE — PLAN OF CARE
Plan of Care Review  Plan of Care Reviewed With: patient  Progress: no change  Outcome Evaluation: Awake alert- [place] despite bilateral hearing aids. Moist cough- enrique set up for pt to suction his mouth. States nebs are very helpful with his cough. Swallowed his cough lozenge and became very upset- no difficulty breathing and was able to speak and yell loudly. Penis is red, excoriated and edematous- cleansed kathleen area multiple times and barrier cream applied after incontinence episodes. FSP maintained. Uses call bell for needs.

## 2025-06-05 PROBLEM — Z51.5 HOSPICE CARE: Status: RESOLVED | Noted: 2025-01-01 | Resolved: 2025-01-01

## 2025-06-05 NOTE — NURSING NOTE
Pt with less labored breathing, however remains very gurgly this am.  Medicated with robinul as per ordered, and scop patch in place behind right ear.  Pt more lethargic.

## 2025-06-05 NOTE — SIGNIFICANT EVENT
.     Internal Medicine  Death Pronouncement Note     SUMMARY OF EVENT   89 y.o. 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 He was recently discharged  [facility] after admission for toxic metabolic encephalopathy and CALI secondary to acute cystitis (ESBL Proteus), treated with IV meropenem and discharged on oral cefuroxime.     Today, he was brought to the ED due to persistent fatigue and worsened debility since discharge     He was admitted to the hospital for comfort measures and LTC placement with hospice. Over the course of family working on placement, he deteriorated and was IP appropriate. Family signed on with hospice.      PRELIMINARY CAUSE OF DEATH      Preliminary Cause of Death- Due to/Consequence of: multi-factorial iso HFrEF, TR, afib, recurrent pleural effusion, recurrent GIB, UTI     Date of death pronouncement: 6/5/25    Time of death pronouncement: 1857      PHYSICAL EXAM     General: No signs of life, work of breathing  HEENT: Pupils dilated, fixed; nonreactive to light; negative corneal reflexes  Cardio: No heart sounds auscultated; no femoral, radial, carotid pulses palpated  Pulm: No breath sounds auscultated or noted chest rise  Neuro: No response to verbal, tactile, painful stimuli       NOTIFICATIONS      Family Member(s) Notified (please list name(s)): Marko Elena     Attending of Record or Covering Physician Notified (please list name): Dr. Tillman    Medical Examiner/ Case? (if yes, please list the person in the 's Office that was  contacted): Yes, Araceli Smith   AUTOPSY      Autopsy requested by family member/POA: Yes/No: no    If yes to above, please list name of family member/POA requesting autopsy: NA    If yes to above, authorized family member/POA has signed Autopsy Permission Form in Death Packet (must be done 24 hours a day): NA        If yes to above, name of person in STAT Lab ([phone] at [facility]) who was notified by physician (must be done 24 hours a day): NA    If yes to above, name of nurse notified of autopsy request (must be done 24 hours a day): NA

## 2025-06-05 NOTE — DISCHARGE SUMMARY
Hospital Medicine    Inpatient Discharge Summary        BRIEF OVERVIEW   Patient: Samy Elena  Admission Date: 2025   : 10/6/1930 Discharge Date: 2025       PCP: Zachery Hernandez MD  Disposition: Hospice/Medical Facility - [facility]    Destination:       Attending Provider: Nicolas Lee DO Attending phys phone: [phone]    Code Status At Discharge: DNR (A.N.D.)    Samy Elena was seen by the palliative care team during this hospitalization.  Please see palliative care team documentation for further details.  Also reference the advance care planning (ACP) website (Advance Care Planning (Advance Directive)  Patient services  [facility] ) to learn more about completing an advance directive, including Health Care Power of  and Living Will documents.         ASSESSMENT AND PLAN   Problem List on the Day of Discharge  #Hospice care  - continue scopolamine patch  - robinul  - ativan 1 mg Q4H PRN  - dilaudid PCA    Brief Hospital Course  This is a 89 y.o. 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 [place] [facility] [place] admission for toxic metabolic encephalopathy and CALI secondary to acute cystitis (ESBL Proteus), treated with IV meropenem and discharged on oral cefuroxime.     Today, he was brought to the ED due to persistent fatigue and worsened debility since discharge    He was admitted to the hospital for comfort measures and LTC placement with hospice. Over the course of family working on  placement, he deteriorated and was Gip appropriate. Family signed on with hospice.     Exam on Day of Discharge  Temp:  [36.5 °C (97.7 °F)-36.7 °C (98 °F)] 36.5 °C (97.7 °F)  Heart Rate:  [82-93] 93  Resp:  [20-32] 22    Physical Exam  Vitals and nursing note reviewed.   Constitutional:       Appearance: He is ill-appearing.   HENT:      Head: Normocephalic and atraumatic.   Cardiovascular:      Rate and Rhythm: Normal rate and regular rhythm.   Pulmonary:      Comments: Increased secretions and upper airway rhonchi  Abdominal:      General: Abdomen is flat.      Palpations: Abdomen is soft.   Skin:     General: Skin is dry.            DISCHARGE MEDICATIONS      Medication List        ASK your doctor about these medications      aspirin 81 mg enteric coated tablet  Take 81 mg by mouth every morning. 9 am  Dose: 81 mg     calcium (as carbonate) 200 mg calcium (500 mg) chewable tablet  Commonly known as: TUMS  Take 1 tablet by mouth every morning. 11 am  Dose: 1 tablet     carboxymethylcellulose 0.5 % dropperette  Commonly known as: REFRESH PLUS  Administer 1 drop into both eyes every2 (two) hours while awake.  Dose: 1 drop     cefUROXime 250 mg tablet  Commonly known as: CEFTIN  Take 1 tablet (250 mg total) by mouth 2 (two) times a day for 3 days.  Dose: 250 mg  Ask about: Should I take this medication?     clonazePAM 0.5 mg tablet  Commonly known as: KlonoPIN  Take 1 tablet (0.5 mg total) by mouth nightly as needed for anxiety.  Dose: 0.5 mg     cranberry extract 425 mg capsule  Take 425 mg by mouth every evening. 5 pm  Dose: 425 mg     cyanocobalamin 500 mcg tablet  Commonly known as: VITAMIN B12  Take 500 mcg by mouth every morning. 9 am  Dose: 500 mcg     ferrous sulfate 325 mg (65 mg iron) tablet  Take 65 mg by mouth daily with breakfast. 11 am  Dose: 65 mg     finasteride 5 mg tablet  Commonly known as: PROSCAR  Take 5 mg by mouth every morning. 9 am  Dose: 5 mg     levothyroxine 100 mcg tablet  Commonly known  as: SYNTHROID  Take 100 mcg by mouth daily before breakfast. 7 am  Dose: 100 mcg     LUMIGAN 0.01 % ophthalmic drops  Administer 1 drop into the left eye nightly.  Dose: 1 drop  Generic drug: bimatoprost     methenamine 1 gram tablet  Commonly known as: HIPREX  Take 1 g by mouth daily before lunch. 11 am does not take when on antibiotics  Dose: 1 g     metoprolol succinate XL 25 mg 24 hr tablet  Commonly known as: TOPROL-XL  Take 12.5 mg by mouth every other day.  Dose: 12.5 mg     omeprazole 20 mg capsule  Commonly known as: PriLOSEC  Take 20 mg by mouth every morning. 11 am  Dose: 20 mg     potassium chloride 20 mEq CR tablet  Commonly known as: K-TAB  Take 10 mEq by mouth 2 (two) times a day.  Dose: 10 mEq     tamsulosin 0.4 mg capsule  Commonly known as: FLOMAX  Take 0.4 mg by mouth every morning. 9 am  Dose: 0.4 mg     thiamine HCl 100 mg tablet  Commonly known as: VITAMIN B1  Take 100 mg by mouth every morning. 9 am  Dose: 100 mg     torsemide 10 mg tablet  Wait to take this until your doctor or other care provider tells you to start again.  Commonly known as: DEMADEX  Take 20 mg by mouth 2 (two) times a day. 9 am  Dose: 20 mg                INSTRUCTIONS AND FOLLOW-UP       Important Issues to Address in Follow-Up      Scheduled Appointments            In 5 days HEIDI [facility]            Recommended Follow-up Visits      Test Results Pending at Discharge       LABS AND IMAGING   Pertinent Labs      Pertinent Imaging  CT CHEST WITHOUT IV CONTRAST  Result Date: 5/30/2025  IMPRESSION: Large left-sided pleural effusion nearly encompassing the left hemithorax with mild to moderate left-sided atelectasis.  Small to moderate right lower lobe effusion with atelectasis.  Dependent change in the right upper lobe.  Progression of the right-sided findings versus the prior CT. Abdominal pelvic ascites, progressed versus the prior study. Additional essentially stable  findings as above.    CT ABDOMEN PELVIS WITHOUT IV CONTRAST  Result Date: 5/30/2025  IMPRESSION: Large left-sided pleural effusion nearly encompassing the left hemithorax with mild to moderate left-sided atelectasis.  Small to moderate right lower lobe effusion with atelectasis.  Dependent change in the right upper lobe.  Progression of the right-sided findings versus the prior CT. Abdominal pelvic ascites, progressed versus the prior study. Additional essentially stable findings as above.    X-RAY CHEST 1 VIEW  Result Date: 5/30/2025  IMPRESSION: Improvement in right pleural effusion. Stable pleural parenchymal process at the left lung.    IR THORACENTESIS  Result Date: 5/28/2025  IMPRESSION: Successful ultrasound-guided left-sided thoracentesis with 800 mL pleural fluid removed and discarded. I certify that I have personally reviewed this examination and agree with Avril Loya's report. Gigi Berry M.D.    X-RAY CHEST 1 VIEW  Result Date: 5/28/2025  IMPRESSION: No evidence of pneumothorax..     CT HEAD WITHOUT IV CONTRAST  Result Date: 5/25/2025  IMPRESSION: No evidence of hemorrhage, mass or acute territorial infarction by CT criteria. COMMENT: Technique: Computed tomography of the brain was performed utilizing contiguous 2.5 mm transaxial sections without intravenous contrast administration. CT DOSE:  One or more dose reduction techniques (e.g. automated exposure control, adjustment of the mA and/or kV according to patient size, use of iterative reconstruction technique) utilized for this examination. Comparison studies: Head CT 2/16/2025. Postsurgical change: None. Brain parenchyma: There is no intraparenchymal hemorrhage, mass effect, midline shift or extra-axial fluid collection. White matter changes: Normal for age Ventricles, cisterns, and sulci: Normal in size and configuration. Sella: Normal in appearance. Cerebellar tonsils: Normal. Calvarium and extra cranial soft tissues: Normal. Paranasal sinuses:  Clear bilaterally. Mastoid air cell: Normal. Orbits: Normal.     CT CHEST/ABDOMEN/PELVIS WITH IV CONTRAST  Result Date: 5/24/2025  IMPRESSION: 1.  Bilateral pleural effusions, left greater than right with associated consolidation. 2.  Cardiomegaly with enlarged inferior vena cava and findings suggestive of right-sided heart failure. 3. 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     X-RAY CHEST 1 VIEW  Result Date: 5/24/2025  IMPRESSION: No interval change in appearance of the chest.  There is cardiomegaly and a large pleural parenchymal process in the left lung.    IR THORACENTESIS  Result Date: 5/22/2025  IMPRESSION: Successful ultrasound-guided left-sided thoracentesis with 750 mL pleural fluid removed and discarded. I certify that I have personally reviewed this examination and agree with Avril Loya's report. Gigi Berry M.D.    X-RAY CHEST 1 VIEW  Result Date: 5/22/2025  IMPRESSION: Small right and moderate left pleural effusions.  No pneumothorax seen.    Cardiac Imaging    TRANSTHORACIC ECHO (TTE) COMPLETE 09/24/2024    Interpretation Summary  Technically difficult study.    The left ventricular cavity size is small with mild left ventricular hypertrophy and mildly reduced systolic function. Estimated EF 40-45% by visual estimate. Abnormal septal motion due to RV volume overload. Unable to assess diastolic function due to technically difficult study. Low stroke volume (32 ml).    The aortic valve is tricuspid. Aortic valve and root sclerosis. Mild aortic regurgitation.    The mitral valve is thickened. Mild mitral annular calcification. Trace regurgitation.    The left atrium is normal in size.    The right ventricular cavity is massively dilated with severely decreased systolic function. Apical hypercontractility.    The pulmonic valve is not well seen. There is trace pulmonic regurgitation.    There is a 13 mm coaptation gap between the leaflets of the tricuspid valve resulting in  torrential ( wide -open) tricuspid regurgitation (PISA EROA 2.4cm2, Rvol 96 mL). Tricuspid valve annulus is dilated and measures 6.8 cm in diameter. Unable to assess RVSP in the setting of torrential tricuspid regurgitation.    The right atrium is severely dilated.    The IVC is massively dilated and collapses less than 50% with inspiration consistent with severely elevated right atrial pressure.    No pericardial effusion. Large pleural effusion.    Compared to previous echocardiogram from 3/28/2024, no significant change.       OTHER SERVICES PROVIDED   Consults During Admission  IP CONSULT TO HOSPICE  IP CONSULT TO PAIN/PALLIATIVE CARE    Procedures Performed         Thank you for allowing the Division of Hospital Medicine to care for your patient.        >30 mins spent for coordination/counselling related to discharge plan, including final examination of patient, discussion regarding discharge instructions, reconciliation/prescription of medications, preparation of discharge records, etc.

## 2025-06-05 NOTE — PLAN OF CARE
"Plan of Care Review  Plan of Care Reviewed With: patient  Progress: declining  Outcome Evaluation: Pt with labored breathing, sob, gasping, and persistent coughing.  Pt very gurgly sounding.  Pt yelling out frequently \"help\".   Medicated per MAR with out relief.  Discussed with covering NP.  Morphine IV ordered and given.  Fall safety precautions in place and call bell with in reach.  "

## 2025-06-05 NOTE — H&P
Hospital Medicine    History & Physical        CHIEF COMPLAINT   hospice   HISTORY OF PRESENT ILLNESS    89 y.o. 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 He was recently discharged  from [facility] on 5/29/25 after admission for toxic metabolic encephalopathy and CALI secondary to acute cystitis (ESBL Proteus), treated with IV meropenem and discharged on oral cefuroxime.     Today, he was brought to the ED due to persistent fatigue and worsened debility since discharge     He was admitted to the hospital for comfort measures and LTC placement with hospice. Over the course of family working on placement, he deteriorated and was Gip appropriate. Family signed on with hospice.    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)     Coronary artery disease     Disease of thyroid gland     Will catheter in place     6/25 not at present    GI (gastrointestinal bleed)     Hypertension     Myocardial infarction (CMS/HCC)     Orthostatic hypotension     TIA (transient ischemic attack)        Past Surgical History   Procedure Laterality Date    cataract extraction right eye with implant and limbal relaxing incision Right 7/18/2024    Performed by Hayder Moses MD at  OR [facility]    Cataract extraction w/ intraocular lens implant Left     Cataract extraction with LRI and anterior vitrectomy  left eye Left 11/16/2023    Performed by Hayder Moess MD at  OR [facility]    Cholecystectomy      Colostomy      COLOSTOMY LAPAROSCOPIC N/A 8/3/2022    Performed by Mat Bryant MD at [facility] OR    Coronary artery bypass graft      DIAGNOSTIC FLEXIBLE SIGMOIDOSCOPY WITH COLLECTION OF SPECIMEN BY WASHING N/A 11/27/2024    Performed by David Holcomb MD at [facility] GI    DIAGNOSTIC FLEXIBLE SIGMOIDOSCOPY WITH COLLECTION OF SPECIMEN BY WASHING N/A 10/18/2024    Performed by Maine Lopez MD at [facility] GI    DIAGNOSTIC UPPER GASTROINTESTINAL ENDOSCOPY WITH COLLECTION OF SPECIMEN BY WASHING N/A 11/27/2024    Performed by David Holcomb MD at [facility] GI    FLEXIBLE SIGMOIDOSCOPY WITH BIOPSY N/A 10/23/2024    Performed by David Holcomb MD at [facility] GI    INCISION AND DRAINAGE WITH DEBRIDMENT OF BUTTOCK, AND PERINEUM N/A 8/2/2022    Performed by Mat Bryant MD at [facility] OR    Joint replacement Left     Knee    RIGHT HEART CATH N/A 8/4/2022    Performed by Cristal Lacey MD at [facility] CARDIAC CATH/EP    Thyroidectomy      WASHOUT OF PERINEAL WOUND, COLONIC LAVAGE N/A 8/3/2022    Performed by Mat Bryant MD at [facility] OR       PCP: Zachery Hernandez MD   MEDICATIONS   Prior to Admission medications   Medication Sig Start Date End Date Taking? Authorizing Provider   aspirin 81 mg enteric coated tablet Take 81 mg by mouth every morning. 9 am    ProviderClay MD   bimatoprost (LUMIGAN) 0.01 % ophthalmic drops Administer 1 drop into the left eye nightly.    Provider, Clay Lopez MD   calcium, as carbonate, (TUMS) 200 mg calcium (500 mg) chewable tablet Take 1 tablet by mouth every morning. 11 am    ProviderClay MD   carboxymethylcellulose (REFRESH PLUS) 0.5 % dropperette Administer 1 drop into both eyes every2 (two) hours while awake.    ProviderClay MD   clonazePAM (KlonoPIN) 0.5 mg tablet Take 1 tablet (0.5 mg total) by mouth nightly as needed for anxiety. 5/22/25 6/21/25  Gustitis,  HEIDI Paris   cranberry extract 425 mg capsule Take 425 mg by mouth every evening. 5 pm    Clay Chambers MD   cyanocobalamin (VITAMIN B12) 500 mcg tablet Take 500 mcg by mouth every morning. 9 am    Clay Chambers MD   ferrous sulfate 325 mg (65 mg iron) tablet Take 65 mg by mouth daily with breakfast. 11 am    Clay Chambers MD   finasteride (PROSCAR) 5 mg tablet Take 5 mg by mouth every morning. 9 am    Clay Chambers MD   levothyroxine (SYNTHROID) 100 mcg tablet Take 100 mcg by mouth daily before breakfast. 7 am    Clay Chambers MD   methenamine (HIPREX) 1 gram tablet Take 1 g by mouth daily before lunch. 11 am does not take when on antibiotics    Clay Chambers MD   metoprolol succinate XL (TOPROL-XL) 25 mg 24 hr tablet Take 12.5 mg by mouth every other day.    Clay Chambers MD   omeprazole (PriLOSEC) 20 mg capsule Take 20 mg by mouth every morning. 11 am    Clay Chambers MD   potassium chloride (K-TAB) 20 mEq CR tablet Take 10 mEq by mouth 2 (two) times a day.    Clay Chambers MD   tamsulosin (FLOMAX) 0.4 mg capsule Take 0.4 mg by mouth every morning. 9 am    Clay Chambers MD   thiamine HCl (VITAMIN B1) 100 mg tablet Take 100 mg by mouth every morning. 9 am    Clay Chambers MD   [Paused] torsemide (DEMADEX) 10 mg tablet Take 20 mg by mouth 2 (two) times a day. 9 am  Wait to take this until your doctor or other care provider tells you to start again.    Clay Chambers MD   amLODIPine (NORVASC) 2.5 mg tablet amlodipine 2.5 mg tablet  7/22/22  John Chambers MD   apixaban (ELIQUIS) 2.5 mg tablet   7/22/22  John Chambers MD   hydrochlorothiazide (MICROZIDE) 12.5 mg capsule hydrochlorothiazide 12.5 mg capsule  7/22/22  John Chambers MD      ALLERGIES   Jardiance [empagliflozin]   FAMILY HISTORY   No family history on file.   SOCIAL HISTORY   Social  History     Substance and Sexual Activity   Drug Use Never     Tobacco Use: Low Risk  (12/30/2024)    Patient History     Smoking Tobacco Use: Never     Smokeless Tobacco Use: Never     Passive Exposure: Not on file     Alcohol Use: Not At Risk (6/3/2025)    AUDIT-C     Frequency of Alcohol Consumption: Monthly or less     Average Number of Drinks: Patient does not drink     Frequency of Binge Drinking: Never                REVIEW OF SYSTEMS   All other systems reviewed and negative except as noted in HPI   PHYSICAL EXAMINATION   Temp:  [36.5 °C (97.7 °F)-36.7 °C (98 °F)] 36.5 °C (97.7 °F)  Heart Rate:  [82-93] 93  Resp:  [20-32] 22  There is no height or weight on file to calculate BMI.    Physical Exam  Vitals and nursing note reviewed.   Constitutional:       Appearance: He is ill-appearing.   HENT:      Head: Normocephalic and atraumatic.   Cardiovascular:      Rate and Rhythm: Normal rate and regular rhythm.   Pulmonary:      Comments: Increased secretions and upper airway rhonchi  Abdominal:      General: Abdomen is flat.      Palpations: Abdomen is soft.   Skin:     General: Skin is dry.    LABS / IMAGING / EKG      ASSESSMENT AND PLAN     #Hospice care  - continue scopolamine patch  - robinul  - ativan 1 mg Q4H PRN  - dilaudid PCA           VTE Assessment: Padua    VTE Prophylaxis: Current anticoagulants:    None      Code Status: DNR (A.N.D.)      Discussed advanced care planning.   Estimated Discharge Date: 6/7/2025  Disposition Planning: FIORELLA Lee DO  6/5/2025

## 2025-06-05 NOTE — PROGRESS NOTES
Hospice General Inpatient Admission Note      Patient Name: Samy Elena                                                                                        Patient MRN: [id number]  Date / Time Note Created: 6/5/2025 2:44 PM  Attending Physician: Nicolas Lee DO   [facility] Medicine Service:    Primary Care Physician: Zachery Hernandez MD     Code Status History:  Current Code Status: DNR (A.N.D.)    Anglican:    Nondenominational    Allergies:  Allergies   Allergen Reactions    Jardiance [Empagliflozin] Other (see comments)     denise's gangrene       Advance Directives:  Hospice Consents Signed and on the patients chart.  Emergency Contact: Marko Elena; Home Phone: [phone]; Relationship: Son; MCKAYLA ELENA; Relationship: Son; Watson Elena; Relationship: Son; Marsha Burgos; Relationship: Grandchild    Hospice Assessment:  Diagnosis: Heart Failure  Current Palliative Performance Status:  10%  Preferred Setting for Care: General Inpatient Hospice    Plan:  Patient was admitted to general inpatient level of hospice care for management of uncontrolled Dyspnea, Secretions.  Terminal Diagnosis: Heart Failure  POC collaborated with the medical and care teams at [facility].  Dilaudid PCA, glycopyrrolate, lorazepam, O2, ordered to manage symptoms.    Pt's son, Marko, signed the hospice consents.  Emotional comfort and support provided to the pt and his family, present tat the bedside.     Bereavement services explained.       Please Call [facility] 24/7 at [phone] with:    Any questions or concerns.  Any problems with management of current syptoms or if new symptoms develop.  Patient expires.     Thank you for the opportunity to participate in this patient's hospital plan of care.      Sonia Meyers RN, PN  Hospice Liaison at [facility]  [facility]  [address] [place]   [address]  Call [phone] 24 hours a day for questions  or concerns

## 2025-06-05 NOTE — PLAN OF CARE
"Plan of Care Review  Progress: declining  Outcome Evaluation: Pt with labored breathing, sob, gasping, and persistent coughing.  Pt very gurgly sounding.  Pt yelling out frequently \"help\".   Medicated per MAR with out relief.  Discussed with covering NP.  Morphine IV ordered and given.  Fall safety precautions in place and call bell with in reach.  "

## 2025-06-06 ENCOUNTER — POST MORTEM DOCUMENTATION (OUTPATIENT)
Dept: HEALTH INFORMATION MANAGEMENT | Age: 89
End: 2025-06-06
Payer: MEDICARE

## 2025-06-06 NOTE — DISCHARGE SUMMARY
Hospital Medicine    Inpatient Discharge Summary        BRIEF OVERVIEW     Patient: Samy Elena  Admission Date: 2025   : 10/6/1930 Discharge Date: 2025       PCP: Zachery Hernandez MD  Disposition:      Destination:       Attending Provider: Nicolas Lee DO Attending phys phone: [phone]    Code Status At Discharge: DNR (A.N.D.)        ASSESSMENT AND PLAN     Assessment & Plan  Hospice care        Brief Hospital Course  89 y.o. 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 He was recently discharged  from [facility] on 25 after admission for toxic metabolic encephalopathy and CALI secondary to acute cystitis (ESBL Proteus), treated with IV meropenem and discharged on oral cefuroxime.     Today, he was brought to the ED due to persistent fatigue and worsened debility since discharge     He was admitted to the hospital for comfort measures and LTC placement with hospice. Over the course of family working on placement, he deteriorated and was IP appropriate. Family signed on with hospice.     Patient  and was pronounced on 25 @ 1857. Family at bedside; condolences given.     Exam on Day of Discharge  Temp:  [36.5 °C (97.7 °F)-36.7 °C (98 °F)] 36.5 °C (97.7 °F)  Heart Rate:  [82-93] 93  Resp:  [20-32] 22        DISCHARGE MEDICATIONS         Medication List        ASK your doctor about these medications      aspirin 81 mg enteric coated tablet  Take 81 mg by mouth every morning. 9 am  Dose: 81 mg     calcium (as carbonate) 200 mg calcium (500 mg) chewable  tablet  Commonly known as: TUMS  Take 1 tablet by mouth every morning. 11 am  Dose: 1 tablet     carboxymethylcellulose 0.5 % dropperette  Commonly known as: REFRESH PLUS  Administer 1 drop into both eyes every2 (two) hours while awake.  Dose: 1 drop     clonazePAM 0.5 mg tablet  Commonly known as: KlonoPIN  Take 1 tablet (0.5 mg total) by mouth nightly as needed for anxiety.  Dose: 0.5 mg     cranberry extract 425 mg capsule  Take 425 mg by mouth every evening. 5 pm  Dose: 425 mg     cyanocobalamin 500 mcg tablet  Commonly known as: VITAMIN B12  Take 500 mcg by mouth every morning. 9 am  Dose: 500 mcg     ferrous sulfate 325 mg (65 mg iron) tablet  Take 65 mg by mouth daily with breakfast. 11 am  Dose: 65 mg     finasteride 5 mg tablet  Commonly known as: PROSCAR  Take 5 mg by mouth every morning. 9 am  Dose: 5 mg     levothyroxine 100 mcg tablet  Commonly known as: SYNTHROID  Take 100 mcg by mouth daily before breakfast. 7 am  Dose: 100 mcg     LUMIGAN 0.01 % ophthalmic drops  Administer 1 drop into the left eye nightly.  Dose: 1 drop  Generic drug: bimatoprost     methenamine 1 gram tablet  Commonly known as: HIPREX  Take 1 g by mouth daily before lunch. 11 am does not take when on antibiotics  Dose: 1 g     metoprolol succinate XL 25 mg 24 hr tablet  Commonly known as: TOPROL-XL  Take 12.5 mg by mouth every other day.  Dose: 12.5 mg     omeprazole 20 mg capsule  Commonly known as: PriLOSEC  Take 20 mg by mouth every morning. 11 am  Dose: 20 mg     potassium chloride 20 mEq CR tablet  Commonly known as: K-TAB  Take 10 mEq by mouth 2 (two) times a day.  Dose: 10 mEq     tamsulosin 0.4 mg capsule  Commonly known as: FLOMAX  Take 0.4 mg by mouth every morning. 9 am  Dose: 0.4 mg     thiamine HCl 100 mg tablet  Commonly known as: VITAMIN B1  Take 100 mg by mouth every morning. 9 am  Dose: 100 mg     torsemide 10 mg tablet  Wait to take this until your doctor or other care provider tells you to start again.  Commonly  known as: DEMADEX  Take 20 mg by mouth 2 (two) times a day. 9 am  Dose: 20 mg             INSTRUCTIONS AND FOLLOW-UP       Scheduled Appointments            In 5 days HEIDI [facility]            Recommended Follow-up Visits      Test Results Pending at Discharge      LABS AND IMAGING       Pertinent Imaging  CT CHEST WITHOUT IV CONTRAST  Result Date: 5/30/2025  IMPRESSION: Large left-sided pleural effusion nearly encompassing the left hemithorax with mild to moderate left-sided atelectasis.  Small to moderate right lower lobe effusion with atelectasis.  Dependent change in the right upper lobe.  Progression of the right-sided findings versus the prior CT. Abdominal pelvic ascites, progressed versus the prior study. Additional essentially stable findings as above.    CT ABDOMEN PELVIS WITHOUT IV CONTRAST  Result Date: 5/30/2025  IMPRESSION: Large left-sided pleural effusion nearly encompassing the left hemithorax with mild to moderate left-sided atelectasis.  Small to moderate right lower lobe effusion with atelectasis.  Dependent change in the right upper lobe.  Progression of the right-sided findings versus the prior CT. Abdominal pelvic ascites, progressed versus the prior study. Additional essentially stable findings as above.    X-RAY CHEST 1 VIEW  Result Date: 5/30/2025  IMPRESSION: Improvement in right pleural effusion. Stable pleural parenchymal process at the left lung.    IR THORACENTESIS  Result Date: 5/28/2025  IMPRESSION: Successful ultrasound-guided left-sided thoracentesis with 800 mL pleural fluid removed and discarded. I certify that I have personally reviewed this examination and agree with Avril Loya's report. Gigi Berry M.D.    X-RAY CHEST 1 VIEW  Result Date: 5/28/2025  IMPRESSION: No evidence of pneumothorax..     CT HEAD WITHOUT IV CONTRAST  Result Date: 5/25/2025  IMPRESSION: No evidence of hemorrhage, mass or acute territorial infarction  by CT criteria. COMMENT: Technique: Computed tomography of the brain was performed utilizing contiguous 2.5 mm transaxial sections without intravenous contrast administration. CT DOSE:  One or more dose reduction techniques (e.g. automated exposure control, adjustment of the mA and/or kV according to patient size, use of iterative reconstruction technique) utilized for this examination. Comparison studies: Head CT 2/16/2025. Postsurgical change: None. Brain parenchyma: There is no intraparenchymal hemorrhage, mass effect, midline shift or extra-axial fluid collection. White matter changes: Normal for age Ventricles, cisterns, and sulci: Normal in size and configuration. Sella: Normal in appearance. Cerebellar tonsils: Normal. Calvarium and extra cranial soft tissues: Normal. Paranasal sinuses: Clear bilaterally. Mastoid air cell: Normal. Orbits: Normal.     CT CHEST/ABDOMEN/PELVIS WITH IV CONTRAST  Result Date: 5/24/2025  IMPRESSION: 1.  Bilateral pleural effusions, left greater than right with associated consolidation. 2.  Cardiomegaly with enlarged inferior vena cava and findings suggestive of right-sided heart failure. 3. 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     X-RAY CHEST 1 VIEW  Result Date: 5/24/2025  IMPRESSION: No interval change in appearance of the chest.  There is cardiomegaly and a large pleural parenchymal process in the left lung.    IR THORACENTESIS  Result Date: 5/22/2025  IMPRESSION: Successful ultrasound-guided left-sided thoracentesis with 750 mL pleural fluid removed and discarded. I certify that I have personally reviewed this examination and agree with Avril Loya's report. Gigi Berry M.D.    X-RAY CHEST 1 VIEW  Result Date: 5/22/2025  IMPRESSION: Small right and moderate left pleural effusions.  No pneumothorax seen.    Cardiac Imaging    TRANSTHORACIC ECHO (TTE) COMPLETE 09/24/2024    Interpretation Summary  Technically difficult study.    The left  ventricular cavity size is small with mild left ventricular hypertrophy and mildly reduced systolic function. Estimated EF 40-45% by visual estimate. Abnormal septal motion due to RV volume overload. Unable to assess diastolic function due to technically difficult study. Low stroke volume (32 ml).    The aortic valve is tricuspid. Aortic valve and root sclerosis. Mild aortic regurgitation.    The mitral valve is thickened. Mild mitral annular calcification. Trace regurgitation.    The left atrium is normal in size.    The right ventricular cavity is massively dilated with severely decreased systolic function. Apical hypercontractility.    The pulmonic valve is not well seen. There is trace pulmonic regurgitation.    There is a 13 mm coaptation gap between the leaflets of the tricuspid valve resulting in torrential ( wide -open) tricuspid regurgitation (PISA EROA 2.4cm2, Rvol 96 mL). Tricuspid valve annulus is dilated and measures 6.8 cm in diameter. Unable to assess RVSP in the setting of torrential tricuspid regurgitation.    The right atrium is severely dilated.    The IVC is massively dilated and collapses less than 50% with inspiration consistent with severely elevated right atrial pressure.    No pericardial effusion. Large pleural effusion.    Compared to previous echocardiogram from 3/28/2024, no significant change.         Statement Selected

## 2025-07-07 NOTE — PROGRESS NOTES
Cardiology Progress Note   [facility]    [facility]  [facility] [address]  [address] [place] [address]    TEL  [phone]  [facility]       Patient: Samy Elena  MRN: [id number]  : 10/6/1930  Admission Date: 2022   Consult Date: 22  Reason for Consult: H/o HFrEF, PH, atrial fibrillation -postop management   Outpatient Cardiologist: Dr. Yovanny Cruz ( office visit 2022)     Interval History:   Seen and examined early this am.    Patient denies any pain or shortness of breath.  Speech improving per nursing      HPI:   Samy Elena is a 89 y.o. 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    Cardiology is being consulted for management recommendations given his extensive cardiac history.     Her chart review, the patient had a recent admission at the end of July with changes in speech. CVA was ruled out at the time of that admission .symptoms was attributed to  postural/orthostatic changes related to symptomatic hypotension. Patient was discharged to rehabilitation unit where he developed  buttocks and pubic pain.He was referred to be seen by surgery as outpatient that lead to current admission.       Past Medical History:   Diagnosis Date   • Atrial fibrillation (CMS/HCC)    • Disease of thyroid gland    • GI (gastrointestinal bleed)    • Hypertension    • Myocardial infarction (CMS/HCC)           Past Surgical History:   Procedure Laterality Date   • BYPASS GRAFT         Social History     Socioeconomic History   • Marital status:      Spouse name: Not on file   • Number of children: Not on file   • Years of education: Not on file   • Highest education level: Not on file   Occupational History   • Not on file   Tobacco Use  How Severe Is Your Rash?: moderate   • Smoking status: Never Smoker   • Smokeless tobacco: Never Used   Substance and Sexual Activity   • Alcohol use: Not on file   • Drug use: Not on file   • Sexual activity: Not on file   Other Topics Concern   • Not on file   Social History Narrative   • Not on file     Social Determinants of Health     Financial Resource Strain: Not on file   Food Insecurity: No Food Insecurity   • Worried About Running Out of Food in the Last Year: Never true   • Ran Out of Food in the Last Year: Never true   Transportation Needs: Not on file   Physical Activity: Not on file   Stress: Not on file   Social Connections: Not on file   Intimate Partner Violence: Not on file   Housing Stability: Not on file        History reviewed. No pertinent family history.     Jardiance [empagliflozin]    Current Outpatient Medications   Medication Instructions   • acetaminophen (TYLENOL) 325 mg tablet oral   • acetaminophen (TYLENOL) 650 mg, feeding tube, Every 4 hours PRN   • aspirin 81 mg, feeding tube, Daily   • atorvastatin (LIPITOR) 40 mg, feeding tube, Daily (6p)   • bisacodyL (DULCOLAX) 10 mg suppository rectal   • clopidogreL (PLAVIX) 75 mg, feeding tube, Daily   • coenzyme Q10 (COQ10) 100 mg, oral, Daily   • collagenase (SANTYL) ointment 1 application, Topical, 2 times daily, Apply to buttock wound twice daily, then pack with dakin's soaked gauze.   • empagliflozin (JARDIANCE) 10 mg, oral, Daily   • insulin regular U-100 (HUMULIN R,NOVOLIN R) 4 Units, subcutaneous, Every 6 hours, While on tube feeds   • insulin regular U-100 (HUMULIN R,NOVOLIN R) 3-5 Units, subcutaneous, Every 6 hours PRN, Give in addition to scheduled dose: Dose = 3 units for -280,  Dose = 5 units for -300   • lansoprazole (PREVACID) 30 mg, feeding tube, Daily   • levothyroxine (SYNTHROID) 100 mcg, feeding tube, Daily (6:30a)   • metoprolol tartrate (LOPRESSOR) 25 mg, feeding tube, 2 times daily   • multivitamin (THERAGRAN) tablet oral, Daily   •  Is This A New Presentation, Or A Follow-Up?: Rash sacubitriL-valsartan (ENTRESTO) 49-51 mg per tablet 1 tablet, oral, 2 times daily   • sildenafiL (pulm.hypertension) (REVATIO) 20 mg, oral, 3 times daily   • simvastatin (ZOCOR) 10 mg, oral, Nightly   • sodium hypochlorite (DAKIN'S, QUARTER-STRENGTH,) 0.125 % solution Topical, Daily, Apply to guaze packing with twice daily packing changes along buttock wound   • spironolactone (ALDACTONE) 25 mg, oral, Daily   • torsemide (DEMADEX) 10-20 mg, feeding tube, Daily, 10 mg if weight is 176-181 lbs, 20 mg if weight is >181 lbs       Scheduled Meds:  • aspirin  81 mg feeding tube Daily   • atorvastatin  40 mg feeding tube Daily (6p)   • clopidogreL  75 mg feeding tube Daily   • collagenase  1 application Topical Daily   • heparin (porcine)  5,000 Units subcutaneous q8h IVÁN   • insulin regular  3-5 Units subcutaneous q6h IVÁN   • insulin regular  4 Units subcutaneous q6h IVÁN   • lansoprazole  30 mg feeding tube Daily   • levothyroxine  100 mcg feeding tube Daily (6:30a)   • metoprolol tartrate  25 mg feeding tube BID   • sodium hypochlorite   Topical Daily   • torsemide  10 mg feeding tube Daily     Continuous Infusions:    PRN Meds:.•  acetaminophen  •  glucose **OR** dextrose **OR** glucagon **OR** dextrose 50 % in water (D50)  •  HYDROmorphone      Intake/Output Summary (Last 24 hours) at 8/22/2022 1655  Last data filed at 8/22/2022 1459  Gross per 24 hour   Intake 2446.67 ml   Output 2115 ml   Net 331.67 ml        Weights (last 7 days)     Date/Time Weight    08/21/22 0600 94.1 kg (207 lb 7.3 oz)    08/20/22 0400 93.8 kg (206 lb 12.7 oz)    08/18/22 0523 90.8 kg (200 lb 2.8 oz)           Wt Readings from Last 3 Encounters:   08/21/22 94.1 kg (207 lb 7.3 oz)   08/17/22 80.3 kg (177 lb)   08/02/22 80.3 kg (177 lb)        REVIEW OF SYSTEMS:    A complete review of systems limited by sedation.    PHYSICAL EXAMINATION:  Visit Vitals  /63 (BP Location: Left upper arm, Patient Position: Lying)   Pulse (!) 101   Temp 36.6  "°C (97.8 °F) (Axillary)   Resp 18   Ht 1.854 m (6' 1\")   Wt 94.1 kg (207 lb 7.3 oz)   SpO2 96%   BMI 27.37 kg/m²     Body surface area is 2.2 meters squared.  Body mass index is 27.37 kg/m².  General: in NAD   HEENT: No corneal arcus or xanthelasmas.  Sclerae are anicteric.   Neck: JVP is not elevated. prominent v wave .   Heart: s1s2 audible, no significant murmurs , irregular   Chest: Symmetrical.  Lungs:Breathing sounds audible bl .  Abdomen: soft, nt , colostomy in place   Extremities: No cyanosis or clubbing.no  lower extremity edema.  Distal pulses are easily palpable.  Skin: Warm and dry and well perfused.      Labs   Results from last 7 days   Lab Units 08/22/22  0529 08/21/22  0815 08/20/22  0754   SODIUM mEQ/L 137 136 137   POTASSIUM mEQ/L 3.9 3.9 3.4*   CHLORIDE mEQ/L 104 104 107   CO2 mEQ/L 26 24 25   BUN mg/dL 27* 28* 25*   CREATININE mg/dL 0.8 0.8 0.8   CALCIUM mg/dL 7.6* 7.7* 7.6*   ALBUMIN g/dL 1.6* 1.7* 1.5*   BILIRUBIN TOTAL mg/dL 0.8 1.0 0.5   ALK PHOS IU/L 215* 212* 187*   ALT IU/L 43 45 45   AST IU/L 117* 124* 121*   GLUCOSE mg/dL 133* 157* 129*       No results found for: HSTROPONINI    Results from last 7 days   Lab Units 08/22/22  0529 08/21/22  0815 08/20/22  0754   WBC K/uL 6.83 6.77 7.43   HEMOGLOBIN g/dL 10.5* 11.4* 10.6*   HEMATOCRIT % 31.2* 33.9* 32.1*   PLATELETS K/uL 161 170 163       Lab Results   Component Value Date    CHOL 94 07/23/2022    HDL 23 (L) 07/23/2022    LDLCALC 63 07/23/2022    TRIG 41 07/23/2022    TSH 9.96 (H) 08/14/2022    HGBA1C 6.3 (H) 07/23/2022       Outside records reviewed..    Imaging  CXR reviewed     Cardiac Studies  TTE 04/13/2022 ( outside report , images not available)     •  A complete transthoracic echocardiogram (including 2D, color flow   Doppler, spectral Doppler and M-mode imaging) was performed using the   standard protocol. The study quality was adequate and poor.  •  The left ventricle is normal in size. Endocardium is incompletely   visualized " and segmental wall motion abnormalities cannot be excluded.   Moderately decreased left ventricular ejection fraction. The left   ventricular ejection fraction by visual estimate is 35% to 40%.  •  There is indeterminate diastolic function.  •  The right ventricle is severely dilated. There is moderately to   severely decreased systolic function of the right ventricle.  •  The left atrium is moderately dilated.  •  The right atrium is severely dilated.  •  There is trivial mitral valve leaflet thickening. There is trace mitral   regurgitation. There is no mitral stenosis.  •  The aortic valve is trileaflet. There is mild aortic valve thickening.   There is mild aortic valve calcification. There is no aortic stenosis.   There is mild aortic regurgitation.  •  There is moderate to severe tricuspid regurgitation.  •  The inferior vena cava is dilated (diameter >21 mm) and decreases <50%   in size with inspiration, suggesting an elevated right atrial pressure of   15 mmHg (range 10-20 mm Hg).  •  There is no prior study available for comparison at this organization.    Left Ventricle  The left ventricle is normal in size. Endocardium is incompletely visualized and segmental wall motion abnormalities cannot be excluded. The left ventricular ejection fraction by visual estimate is 35% to 40%. Moderately decreased left ventricular ejection fraction. There is indeterminate diastolic function.    Right Ventricle  The right ventricle is severely dilated. Off axis imaging of the right ventricle, but visually systolic function appears moderate to severely reduced.    Left Atrium  The left atrium is moderately dilated.    Right Atrium  The right atrium is severely dilated.    IVC/SVC  The inferior vena cava is dilated (diameter >21 mm) and decreases <50% in size with inspiration, suggesting an elevated right atrial pressure of 15 mmHg (range 10-20 mm Hg).    Mitral Valve  There is trivial mitral valve leaflet thickening. There  is no mitral stenosis. There is trace mitral regurgitation.    Tricuspid Valve  There is moderate tricuspid annular dilatation. There is no tricuspid stenosis. There is moderate to severe tricuspid regurgitation.    Aortic Valve  The aortic valve is trileaflet. There is mild aortic valve thickening. There is mild aortic valve calcification. There is no aortic stenosis. There is mild aortic regurgitation.    Pulmonic Valve  There is no pulmonic stenosis. There is mild pulmonic valve regurgitation.    Pericardium  There are echocardiographic findings consistent with fat pad.    Aortic Arch/Descending/Abdominal Aorta  The aortic root is normal in size. The proximal segment of the ascending aorta is normal in size.     ECG   07/22/2022   Atrial fibrillation   rbbb   twi on precordial leads, present on ecg 07/2022     Telemetry  Atrial fibrillation , rbbb with ventricular response in , frequent pvcs    Assessment:  This is a 89 y.o. male being consulted for cardiovascular management    #Justin's gangrene:   #Septic shock:   Management per primary service   On abx; id has been following   empagliflozin should be discontinued at the time of discharge. He was recently started on this agent in may 2022      # iCM:  # HFmrEF:  # PH on sildenafil as outpatient:  NYHA class III and he has ACC/AHA Stage C as outpatient previously   His lvef appears 40-45% with severe RV dilation and diastolic dysfunction.   S/p septic shock; extubated and off pressors     - continue diuretic dose to daily torsemide 10 mg for euvolemia  - Please attempt to R/S entresto 12/13 mg with resolved shock and stable BP's   - Can f/u outpatient with Samy Elena MD ([place]) for ongoing re-initiation)       # Permanent atrial fibrillation:   # Frequent PVCs:  Rate controlled   [id number], Not on AC as outpatient given history of GIB   - continue metoprolol tartrate; increase to 25 mg bid if bp can tolerate   - Maintain K>4 , Mg > 2      Final recommendations are pending attending co-signature.     Dale Moore MD  8/22/2022

## (undated) DEVICE — TOWEL SURGICAL W17XL27IN BLUE COTTON STANDARD PREWASHED DELI

## (undated) DEVICE — CANNULA ANTERIOR CHAMBER .80MM

## (undated) DEVICE — MOUTHPIECE 60FR ENDO BITEBLOCK

## (undated) DEVICE — NEEDLE HYPODERMIC - PRO EDGE SAFETY DEVICE  22G X 1 1/2 NEED

## (undated) DEVICE — TROCAR BLUNT TIP 12 X 100MM

## (undated) DEVICE — TROCAR 1ST ENTRY 5 X 100MM ADV FIX

## (undated) DEVICE — ***USE 143651***SYSTEM MULTIPIECE LENS PLACEMENT

## (undated) DEVICE — PAD EYE OVAL STERILE

## (undated) DEVICE — SEALER/DIVIDER LIGASURE BLUNT TIP LAPAROSCOPIC 5 MM

## (undated) DEVICE — PACK OPHTHALMIC CUSTOM

## (undated) DEVICE — LENS INSERTER SYSTEM LI61AO DISP

## (undated) DEVICE — APPLICATOR COTTON TIP STER 6IN MEDC 2/PK

## (undated) DEVICE — JELLY LUBRICATING STERILE 3GM PACKET

## (undated) DEVICE — TR BAND REGULAR

## (undated) DEVICE — SUTURE VICRYL 3-0 J416H SH UNDYED

## (undated) DEVICE — TIP SUCTION YANKAUER

## (undated) DEVICE — CANNULA 27G CHANG 90 DEGREE

## (undated) DEVICE — SHIELD EYE UNIVERSAL

## (undated) DEVICE — ***USE 69082*** RELOAD 45 BLUE REG TISSUE

## (undated) DEVICE — STRYKEFLOW 2 W/ DISP TIP

## (undated) DEVICE — Device

## (undated) DEVICE — GAUZE VASELINE CISION 1INX8IN

## (undated) DEVICE — LABEL EYES KITS

## (undated) DEVICE — CATH SWANGANZ 7FRINTDIN

## (undated) DEVICE — ***USE 56941*** SUTURE VICRYL 0 J603H UR-6

## (undated) DEVICE — MANIFOLD SINGLE PORT NEPTUNE

## (undated) DEVICE — TIPS SCISSOR MICROLINE LAP

## (undated) DEVICE — SHEATH 7FR 10CM PINNACLE

## (undated) DEVICE — BLADE PROTECTED SIZE 10

## (undated) DEVICE — SUTURE VICRYL 4-0 J426H PS-2 27IN

## (undated) DEVICE — SOLN IRRIG .9%SOD 1000ML

## (undated) DEVICE — NEEDLE PERCUTANEOUS ENTRY

## (undated) DEVICE — SOLN IRRIG STERILE WATER 250ML

## (undated) DEVICE — DRAPE POUCH IRRIGATION

## (undated) DEVICE — TUBING PLUM PORT ACTIVE SMOKE EVAC

## (undated) DEVICE — SPONGE RAYTEC 8X4 12 PLY

## (undated) DEVICE — SOLN BETADINE 4 OZ

## (undated) DEVICE — PACK RFID MLHS MINOR PROC STDZ

## (undated) DEVICE — COLLAGEN SHIELD

## (undated) DEVICE — ***USE 136512*** ENDOCUTTER POWER ECHELON FLEX 45

## (undated) DEVICE — GLIDEWIRE ANGLED .25IN X 150 J WIRE

## (undated) DEVICE — CORD LAPAROSCOPIC DISP STERILE

## (undated) DEVICE — GLOVE SZ 7 LINER PROTEXIS PI BL

## (undated) DEVICE — BLADE SCALPEL #15

## (undated) DEVICE — AMVISC PLUS

## (undated) DEVICE — GLOVE 7 1/2 PROTEXIS PI MICRO

## (undated) DEVICE — BSS 500ML BAG SOLUTION

## (undated) DEVICE — SOLN IV 0.9% NSS 1000ML

## (undated) DEVICE — TAPE DURAPORE 3IN

## (undated) DEVICE — GLOVE SURG PROTEXIS PF 7.0 2D72NS70X

## (undated) DEVICE — GOWN SURGICAL REINFORCED X-LAR

## (undated) DEVICE — GUIDEWIRE DIAGNOSTIC J .035. 150 (ORDER IN 5'S)

## (undated) DEVICE — GOWN SIRUS NONRNF RAGLAN XL ST 30/CS

## (undated) DEVICE — SYRINGE DISP LUER-LOK 10 CC

## (undated) DEVICE — GUIDEWIRE DIAGNOSTIC 035-260 EXCHANGE

## (undated) DEVICE — ADHESIVE SKIN DERMABOND ADVANCED 0.7ML

## (undated) DEVICE — ***USE 138859*** SUTURE PDS II  0 Z334H CT-2 27IN VIOLET

## (undated) DEVICE — TROCAR 1ST ENTRY 12 X 100MM ADV FIX

## (undated) DEVICE — SUTURE VICRYL 3-0 J316H SH VIOLET

## (undated) DEVICE — TAPE ADHESIVE 3IN

## (undated) DEVICE — TUBING INSUFFLATION PNEUMOSURE HEATED HIGH FLOW

## (undated) DEVICE — COUNTER NEEDLE FOAM BLOCK 1840

## (undated) DEVICE — BLADE SCALPEL #10

## (undated) DEVICE — TUBING SMOKE EVAC PENCIL COATED

## (undated) DEVICE — TUBE SUCTION 1/4INX20FT STERILE

## (undated) DEVICE — POUCH STERI DRAPE INSTRUMENT LONG

## (undated) DEVICE — SUTURE SILK 0 K834H

## (undated) DEVICE — DRAPE BREAST SURGICAL 100 X 124 X 76

## (undated) DEVICE — BANDAGE KERLIX STERILE 4.5INX 4.1YDS

## (undated) DEVICE — DRESSING COMBINE 5X9 STERILE

## (undated) DEVICE — ***USE 121402***PACK LAPAROSCOPIC COLON TLH

## (undated) DEVICE — DRESSING SPONGE ALL GAUZE 4X4 STER 10PK